# Patient Record
Sex: FEMALE | Race: BLACK OR AFRICAN AMERICAN | Employment: OTHER | URBAN - METROPOLITAN AREA
[De-identification: names, ages, dates, MRNs, and addresses within clinical notes are randomized per-mention and may not be internally consistent; named-entity substitution may affect disease eponyms.]

---

## 2017-02-06 PROBLEM — R09.89 PULMONARY HYPERINFLATION: Status: ACTIVE | Noted: 2017-02-06

## 2017-02-06 PROBLEM — J44.9 COPD, SEVERITY TO BE DETERMINED (HCC): Status: ACTIVE | Noted: 2017-02-06

## 2017-05-03 PROBLEM — J44.9 COPD, SEVERITY TO BE DETERMINED (HCC): Status: RESOLVED | Noted: 2017-02-06 | Resolved: 2017-05-03

## 2017-05-03 PROBLEM — R09.89 PULMONARY HYPERINFLATION: Status: RESOLVED | Noted: 2017-02-06 | Resolved: 2017-05-03

## 2017-05-03 PROBLEM — J15.4 STREPTOCOCCAL PNEUMONIA (HCC): Status: ACTIVE | Noted: 2017-05-03

## 2017-05-04 PROBLEM — R19.7 DIARRHEA OF PRESUMED INFECTIOUS ORIGIN: Status: ACTIVE | Noted: 2017-05-04

## 2017-05-04 PROBLEM — J15.4 STREPTOCOCCAL PNEUMONIA (HCC): Status: RESOLVED | Noted: 2017-05-03 | Resolved: 2017-05-04

## 2017-05-05 PROBLEM — G93.40 ENCEPHALOPATHY ACUTE: Status: ACTIVE | Noted: 2017-05-05

## 2017-05-05 PROBLEM — J96.20 ACUTE ON CHRONIC RESPIRATORY FAILURE (HCC): Status: ACTIVE | Noted: 2017-05-05

## 2017-09-21 PROBLEM — J47.9 BRONCHIECTASIS WITHOUT COMPLICATION (HCC): Status: ACTIVE | Noted: 2017-09-21

## 2017-09-21 PROBLEM — R09.02 HYPOXEMIA: Status: ACTIVE | Noted: 2017-09-21

## 2017-10-09 PROBLEM — R07.1 CHEST PAIN MADE WORSE BY BREATHING: Status: ACTIVE | Noted: 2017-10-09

## 2017-10-09 PROBLEM — B02.9 SHINGLES RASH: Status: ACTIVE | Noted: 2017-10-09

## 2017-11-19 PROBLEM — I50.9 CHF (CONGESTIVE HEART FAILURE) (HCC): Status: ACTIVE | Noted: 2017-11-19

## 2017-11-19 PROBLEM — I48.91 ATRIAL FIBRILLATION WITH RVR (HCC): Status: ACTIVE | Noted: 2017-11-19

## 2017-11-19 PROBLEM — K43.2 INCISIONAL HERNIA, WITHOUT OBSTRUCTION OR GANGRENE: Status: ACTIVE | Noted: 2017-11-19

## 2017-11-21 PROBLEM — J96.11 CHRONIC RESPIRATORY FAILURE WITH HYPOXIA (HCC): Status: ACTIVE | Noted: 2017-11-21

## 2017-11-21 PROBLEM — J96.11 CHRONIC RESPIRATORY FAILURE WITH HYPOXIA (HCC): Chronic | Status: ACTIVE | Noted: 2017-11-21

## 2017-12-26 PROBLEM — R19.7 DIARRHEA OF PRESUMED INFECTIOUS ORIGIN: Status: RESOLVED | Noted: 2017-05-04 | Resolved: 2017-12-26

## 2017-12-26 PROBLEM — K43.2 INCISIONAL HERNIA, WITHOUT OBSTRUCTION OR GANGRENE: Status: RESOLVED | Noted: 2017-11-19 | Resolved: 2017-12-26

## 2017-12-26 PROBLEM — B02.9 SHINGLES RASH: Status: RESOLVED | Noted: 2017-10-09 | Resolved: 2017-12-26

## 2017-12-26 PROBLEM — R07.1 CHEST PAIN MADE WORSE BY BREATHING: Status: RESOLVED | Noted: 2017-10-09 | Resolved: 2017-12-26

## 2017-12-26 PROBLEM — I48.91 ATRIAL FIBRILLATION WITH RVR (HCC): Status: RESOLVED | Noted: 2017-11-19 | Resolved: 2017-12-26

## 2018-02-08 PROBLEM — J96.01 ACUTE RESPIRATORY FAILURE WITH HYPOXIA (HCC): Status: ACTIVE | Noted: 2018-02-08

## 2018-02-08 PROBLEM — R06.00 DYSPNEA: Status: ACTIVE | Noted: 2018-02-08

## 2018-02-08 PROBLEM — J44.1 COPD EXACERBATION (HCC): Status: ACTIVE | Noted: 2017-02-06

## 2018-02-13 PROBLEM — J96.01 ACUTE RESPIRATORY FAILURE WITH HYPOXIA (HCC): Status: RESOLVED | Noted: 2018-02-08 | Resolved: 2018-02-13

## 2018-03-15 ENCOUNTER — TELEPHONE (OUTPATIENT)
Dept: PHARMACY | Facility: CLINIC | Age: 63
End: 2018-03-15

## 2018-03-15 NOTE — TELEPHONE ENCOUNTER
Spoke to Jaswinder Samuel at [facility] who said the patient is still residing on the skilled nursing floor with them.     Dawson Snyder, PharmD  [address] [place] [address] Pharmacist  [phone] or [phone] (Option 7)

## 2018-03-20 ENCOUNTER — TELEPHONE (OUTPATIENT)
Dept: FAMILY MEDICINE CLINIC | Age: 63
End: 2018-03-20

## 2018-03-20 NOTE — TELEPHONE ENCOUNTER
Thomas Hakwins RN for [facility] admitted patient today for nursing, PT,OT and home health aid. Patient declined Home health aid but is requesting speech therapy for cognition. Please advise if verbal order is appropriate.

## 2018-04-03 DIAGNOSIS — M25.561 RIGHT KNEE PAIN, UNSPECIFIED CHRONICITY: ICD-10-CM

## 2018-04-03 DIAGNOSIS — D86.9 SARCOIDOSIS: ICD-10-CM

## 2018-04-03 DIAGNOSIS — G89.4 CHRONIC PAIN DISORDER: ICD-10-CM

## 2018-04-03 DIAGNOSIS — M25.551 HIP PAIN, RIGHT: ICD-10-CM

## 2018-04-03 DIAGNOSIS — M87.111 STEROID-INDUCED AVASCULAR NECROSIS OF RIGHT SHOULDER (HCC): ICD-10-CM

## 2018-04-03 DIAGNOSIS — M51.26 LUMBAR DISC HERNIATION: ICD-10-CM

## 2018-04-03 DIAGNOSIS — T38.0X5A STEROID-INDUCED AVASCULAR NECROSIS OF RIGHT SHOULDER (HCC): ICD-10-CM

## 2018-04-03 RX ORDER — PROMETHAZINE HYDROCHLORIDE AND CODEINE PHOSPHATE 6.25; 1 MG/5ML; MG/5ML
5 SYRUP ORAL 3 TIMES DAILY PRN
Status: CANCELLED | OUTPATIENT
Start: 2018-04-03

## 2018-04-03 RX ORDER — OXYCODONE HCL 20 MG/1
20 TABLET, FILM COATED, EXTENDED RELEASE ORAL EVERY 12 HOURS
Qty: 60 TABLET | Refills: 0 | Status: SHIPPED | OUTPATIENT
Start: 2018-04-03 | End: 2018-05-03

## 2018-04-03 RX ORDER — PROMETHAZINE HYDROCHLORIDE AND CODEINE PHOSPHATE 6.25; 1 MG/5ML; MG/5ML
10 SYRUP ORAL 3 TIMES DAILY PRN
Qty: 480 ML | Refills: 2 | Status: SHIPPED | OUTPATIENT
Start: 2018-04-03 | End: 2018-08-17 | Stop reason: SDUPTHER

## 2018-04-03 RX ORDER — OXYCODONE AND ACETAMINOPHEN 10; 325 MG/1; MG/1
1 TABLET ORAL EVERY 8 HOURS PRN
Qty: 90 TABLET | Refills: 0 | Status: SHIPPED | OUTPATIENT
Start: 2018-04-03 | End: 2018-05-03

## 2018-04-10 ENCOUNTER — TELEPHONE (OUTPATIENT)
Dept: FAMILY MEDICINE CLINIC | Age: 63
End: 2018-04-10
Payer: COMMERCIAL

## 2018-04-10 DIAGNOSIS — J44.9 OBSTRUCTIVE CHRONIC BRONCHITIS WITHOUT EXACERBATION (HCC): Primary | ICD-10-CM

## 2018-04-10 DIAGNOSIS — E23.2 DIABETES INSIPIDUS (HCC): ICD-10-CM

## 2018-04-10 DIAGNOSIS — I48.0 PAROXYSMAL ATRIAL FIBRILLATION (HCC): ICD-10-CM

## 2018-04-10 DIAGNOSIS — N18.2 CHRONIC KIDNEY DISEASE, STAGE II (MILD): ICD-10-CM

## 2018-04-10 DIAGNOSIS — D86.0 PULMONARY SARCOIDOSIS (HCC): ICD-10-CM

## 2018-04-10 DIAGNOSIS — F33.9 EPISODE OF RECURRENT MAJOR DEPRESSIVE DISORDER, UNSPECIFIED DEPRESSION EPISODE SEVERITY (HCC): ICD-10-CM

## 2018-04-10 DIAGNOSIS — Z99.81 DEPENDENCE ON SUPPLEMENTAL OXYGEN: ICD-10-CM

## 2018-04-10 DIAGNOSIS — I27.20 PULMONARY HTN (HCC): ICD-10-CM

## 2018-04-10 PROCEDURE — G0180 MD CERTIFICATION HHA PATIENT: HCPCS | Performed by: FAMILY MEDICINE

## 2018-05-05 ENCOUNTER — HOSPITAL ENCOUNTER (INPATIENT)
Age: 63
LOS: 3 days | Discharge: NURSING, REHAB OR LONG-TERM CARE | DRG: 191 | End: 2018-05-08
Attending: EMERGENCY MEDICINE | Admitting: FAMILY MEDICINE
Payer: COMMERCIAL

## 2018-05-05 ENCOUNTER — APPOINTMENT (OUTPATIENT)
Dept: GENERAL RADIOLOGY | Age: 63
DRG: 191 | End: 2018-05-05
Payer: COMMERCIAL

## 2018-05-05 DIAGNOSIS — J18.9 COMMUNITY ACQUIRED PNEUMONIA OF RIGHT LOWER LOBE OF LUNG: Primary | ICD-10-CM

## 2018-05-05 PROBLEM — J16.0 CAP (COMMUNITY ACQUIRED PNEUMONIA) DUE TO CHLAMYDIA SPECIES: Status: ACTIVE | Noted: 2018-05-05

## 2018-05-05 LAB
ALBUMIN SERPL-MCNC: 3.7 G/DL (ref 3.5–5.2)
ALP BLD-CCNC: 49 U/L (ref 35–104)
ALT SERPL-CCNC: 10 U/L (ref 0–32)
ANION GAP SERPL CALCULATED.3IONS-SCNC: 9 MMOL/L (ref 7–16)
AST SERPL-CCNC: 20 U/L (ref 0–31)
BASOPHILS ABSOLUTE: 0 E9/L (ref 0–0.2)
BASOPHILS RELATIVE PERCENT: 0 % (ref 0–2)
BILIRUB SERPL-MCNC: <0.2 MG/DL (ref 0–1.2)
BILIRUBIN URINE: NEGATIVE
BLOOD, URINE: NEGATIVE
BUN BLDV-MCNC: 22 MG/DL (ref 8–23)
CALCIUM SERPL-MCNC: 8.7 MG/DL (ref 8.6–10.2)
CHLORIDE BLD-SCNC: 87 MMOL/L (ref 98–107)
CLARITY: CLEAR
CO2: 33 MMOL/L (ref 22–29)
COLOR: YELLOW
CREAT SERPL-MCNC: 1.1 MG/DL (ref 0.5–1)
EKG ATRIAL RATE: 80 BPM
EKG P AXIS: 62 DEGREES
EKG P-R INTERVAL: 126 MS
EKG Q-T INTERVAL: 378 MS
EKG QRS DURATION: 70 MS
EKG QTC CALCULATION (BAZETT): 435 MS
EKG R AXIS: 79 DEGREES
EKG T AXIS: 61 DEGREES
EKG VENTRICULAR RATE: 80 BPM
EOSINOPHILS ABSOLUTE: 0.14 E9/L (ref 0.05–0.5)
EOSINOPHILS RELATIVE PERCENT: 2.6 % (ref 0–6)
FILM ARRAY ADENOVIRUS: NORMAL
FILM ARRAY BORDETELLA PERTUSSIS: NORMAL
FILM ARRAY CHLAMYDOPHILIA PNEUMONIAE: NORMAL
FILM ARRAY CORONAVIRUS 229E: NORMAL
FILM ARRAY CORONAVIRUS HKU1: NORMAL
FILM ARRAY CORONAVIRUS NL63: NORMAL
FILM ARRAY CORONAVIRUS OC43: NORMAL
FILM ARRAY INFLUENZA A VIRUS 09H1: NORMAL
FILM ARRAY INFLUENZA A VIRUS H1: NORMAL
FILM ARRAY INFLUENZA A VIRUS H3: NORMAL
FILM ARRAY INFLUENZA A VIRUS: NORMAL
FILM ARRAY INFLUENZA B: NORMAL
FILM ARRAY METAPNEUMOVIRUS: NORMAL
FILM ARRAY MYCOPLASMA PNEUMONIAE: NORMAL
FILM ARRAY PARAINFLUENZA VIRUS 1: NORMAL
FILM ARRAY PARAINFLUENZA VIRUS 2: NORMAL
FILM ARRAY PARAINFLUENZA VIRUS 3: NORMAL
FILM ARRAY PARAINFLUENZA VIRUS 4: NORMAL
FILM ARRAY RESPIRATORY SYNCITIAL VIRUS: NORMAL
FILM ARRAY RHINOVIRUS/ENTEROVIRUS: NORMAL
GFR AFRICAN AMERICAN: >60
GFR NON-AFRICAN AMERICAN: >60 ML/MIN/1.73
GLUCOSE BLD-MCNC: 89 MG/DL (ref 74–109)
GLUCOSE URINE: NEGATIVE MG/DL
HCT VFR BLD CALC: 27.3 % (ref 34–48)
HEMOGLOBIN: 8.5 G/DL (ref 11.5–15.5)
HYPOCHROMIA: ABNORMAL
INFLUENZA A BY PCR: NOT DETECTED
INFLUENZA B BY PCR: NOT DETECTED
KETONES, URINE: NEGATIVE MG/DL
L. PNEUMOPHILA SEROGP 1 UR AG: NORMAL
LACTIC ACID: 1.6 MMOL/L (ref 0.5–2.2)
LEUKOCYTE ESTERASE, URINE: NEGATIVE
LYMPHOCYTES ABSOLUTE: 0.31 E9/L (ref 1.5–4)
LYMPHOCYTES RELATIVE PERCENT: 6.1 % (ref 20–42)
MCH RBC QN AUTO: 25.5 PG (ref 26–35)
MCHC RBC AUTO-ENTMCNC: 31.1 % (ref 32–34.5)
MCV RBC AUTO: 82 FL (ref 80–99.9)
MONOCYTES ABSOLUTE: 0.16 E9/L (ref 0.1–0.95)
MONOCYTES RELATIVE PERCENT: 2.6 % (ref 2–12)
MYELOCYTE PERCENT: 0.9 % (ref 0–0)
NEUTROPHILS ABSOLUTE: 4.63 E9/L (ref 1.8–7.3)
NEUTROPHILS RELATIVE PERCENT: 87.8 % (ref 43–80)
NITRITE, URINE: NEGATIVE
PDW BLD-RTO: 16.4 FL (ref 11.5–15)
PH UA: 8 (ref 5–9)
PLATELET # BLD: 143 E9/L (ref 130–450)
PMV BLD AUTO: 10 FL (ref 7–12)
POTASSIUM SERPL-SCNC: 5 MMOL/L (ref 3.5–5)
PRO-BNP: 342 PG/ML (ref 0–125)
PROTEIN UA: NEGATIVE MG/DL
RBC # BLD: 3.33 E12/L (ref 3.5–5.5)
SODIUM BLD-SCNC: 129 MMOL/L (ref 132–146)
SPECIFIC GRAVITY UA: 1.01 (ref 1–1.03)
STREP PNEUMONIAE ANTIGEN, URINE: NORMAL
TOTAL PROTEIN: 6.3 G/DL (ref 6.4–8.3)
TROPONIN: <0.01 NG/ML (ref 0–0.03)
UROBILINOGEN, URINE: 0.2 E.U./DL
WBC # BLD: 5.2 E9/L (ref 4.5–11.5)

## 2018-05-05 PROCEDURE — 6370000000 HC RX 637 (ALT 250 FOR IP): Performed by: EMERGENCY MEDICINE

## 2018-05-05 PROCEDURE — 87798 DETECT AGENT NOS DNA AMP: CPT

## 2018-05-05 PROCEDURE — 96374 THER/PROPH/DIAG INJ IV PUSH: CPT

## 2018-05-05 PROCEDURE — 94640 AIRWAY INHALATION TREATMENT: CPT

## 2018-05-05 PROCEDURE — 99222 1ST HOSP IP/OBS MODERATE 55: CPT | Performed by: STUDENT IN AN ORGANIZED HEALTH CARE EDUCATION/TRAINING PROGRAM

## 2018-05-05 PROCEDURE — 87077 CULTURE AEROBIC IDENTIFY: CPT

## 2018-05-05 PROCEDURE — 94761 N-INVAS EAR/PLS OXIMETRY MLT: CPT

## 2018-05-05 PROCEDURE — 6370000000 HC RX 637 (ALT 250 FOR IP): Performed by: STUDENT IN AN ORGANIZED HEALTH CARE EDUCATION/TRAINING PROGRAM

## 2018-05-05 PROCEDURE — 93005 ELECTROCARDIOGRAM TRACING: CPT | Performed by: EMERGENCY MEDICINE

## 2018-05-05 PROCEDURE — 6360000002 HC RX W HCPCS: Performed by: EMERGENCY MEDICINE

## 2018-05-05 PROCEDURE — 83605 ASSAY OF LACTIC ACID: CPT

## 2018-05-05 PROCEDURE — 2580000003 HC RX 258: Performed by: EMERGENCY MEDICINE

## 2018-05-05 PROCEDURE — 36415 COLL VENOUS BLD VENIPUNCTURE: CPT

## 2018-05-05 PROCEDURE — 87502 INFLUENZA DNA AMP PROBE: CPT

## 2018-05-05 PROCEDURE — 87186 SC STD MICRODIL/AGAR DIL: CPT

## 2018-05-05 PROCEDURE — 87088 URINE BACTERIA CULTURE: CPT

## 2018-05-05 PROCEDURE — 2060000000 HC ICU INTERMEDIATE R&B

## 2018-05-05 PROCEDURE — 83880 ASSAY OF NATRIURETIC PEPTIDE: CPT

## 2018-05-05 PROCEDURE — 2700000000 HC OXYGEN THERAPY PER DAY

## 2018-05-05 PROCEDURE — 87149 DNA/RNA DIRECT PROBE: CPT

## 2018-05-05 PROCEDURE — 51702 INSERT TEMP BLADDER CATH: CPT

## 2018-05-05 PROCEDURE — 94664 DEMO&/EVAL PT USE INHALER: CPT

## 2018-05-05 PROCEDURE — 71045 X-RAY EXAM CHEST 1 VIEW: CPT

## 2018-05-05 PROCEDURE — 94760 N-INVAS EAR/PLS OXIMETRY 1: CPT

## 2018-05-05 PROCEDURE — 99285 EMERGENCY DEPT VISIT HI MDM: CPT

## 2018-05-05 PROCEDURE — 80053 COMPREHEN METABOLIC PANEL: CPT

## 2018-05-05 PROCEDURE — 87501 INFLUENZA DNA AMP PROB 1+: CPT

## 2018-05-05 PROCEDURE — 85025 COMPLETE CBC W/AUTO DIFF WBC: CPT

## 2018-05-05 PROCEDURE — 81003 URINALYSIS AUTO W/O SCOPE: CPT

## 2018-05-05 PROCEDURE — 87503 INFLUENZA DNA AMP PROB ADDL: CPT

## 2018-05-05 PROCEDURE — 2580000003 HC RX 258: Performed by: STUDENT IN AN ORGANIZED HEALTH CARE EDUCATION/TRAINING PROGRAM

## 2018-05-05 PROCEDURE — 87040 BLOOD CULTURE FOR BACTERIA: CPT

## 2018-05-05 PROCEDURE — 87581 M.PNEUMON DNA AMP PROBE: CPT

## 2018-05-05 PROCEDURE — 84484 ASSAY OF TROPONIN QUANT: CPT

## 2018-05-05 PROCEDURE — 87486 CHLMYD PNEUM DNA AMP PROBE: CPT

## 2018-05-05 PROCEDURE — 87450 HC DIRECT STREP B ANTIGEN: CPT

## 2018-05-05 RX ORDER — POTASSIUM CHLORIDE 20 MEQ/1
20 TABLET, EXTENDED RELEASE ORAL 2 TIMES DAILY
Status: DISCONTINUED | OUTPATIENT
Start: 2018-05-05 | End: 2018-05-08 | Stop reason: HOSPADM

## 2018-05-05 RX ORDER — METOPROLOL SUCCINATE 25 MG/1
100 TABLET, EXTENDED RELEASE ORAL ONCE
Status: COMPLETED | OUTPATIENT
Start: 2018-05-05 | End: 2018-05-05

## 2018-05-05 RX ORDER — CEFTRIAXONE 1 G/1
1 INJECTION, POWDER, FOR SOLUTION INTRAMUSCULAR; INTRAVENOUS DAILY
Status: DISCONTINUED | OUTPATIENT
Start: 2018-05-05 | End: 2018-05-05 | Stop reason: SDUPTHER

## 2018-05-05 RX ORDER — POLYVINYL ALCOHOL 14 MG/ML
1 SOLUTION/ DROPS OPHTHALMIC PRN
Status: DISCONTINUED | OUTPATIENT
Start: 2018-05-05 | End: 2018-05-08 | Stop reason: HOSPADM

## 2018-05-05 RX ORDER — ESCITALOPRAM OXALATE 10 MG/1
20 TABLET ORAL DAILY
Status: DISCONTINUED | OUTPATIENT
Start: 2018-05-05 | End: 2018-05-08 | Stop reason: HOSPADM

## 2018-05-05 RX ORDER — PREDNISONE 20 MG/1
40 TABLET ORAL DAILY
Status: DISCONTINUED | OUTPATIENT
Start: 2018-05-06 | End: 2018-05-08 | Stop reason: HOSPADM

## 2018-05-05 RX ORDER — ONDANSETRON 2 MG/ML
4 INJECTION INTRAMUSCULAR; INTRAVENOUS EVERY 6 HOURS PRN
Status: DISCONTINUED | OUTPATIENT
Start: 2018-05-05 | End: 2018-05-08 | Stop reason: HOSPADM

## 2018-05-05 RX ORDER — FERROUS SULFATE 325(65) MG
325 TABLET ORAL
Status: DISCONTINUED | OUTPATIENT
Start: 2018-05-05 | End: 2018-05-08 | Stop reason: HOSPADM

## 2018-05-05 RX ORDER — CALCITONIN SALMON 200 [IU]/.09ML
1 SPRAY, METERED NASAL 2 TIMES DAILY
Status: DISCONTINUED | OUTPATIENT
Start: 2018-05-05 | End: 2018-05-05 | Stop reason: CLARIF

## 2018-05-05 RX ORDER — LEVOTHYROXINE SODIUM 0.07 MG/1
75 TABLET ORAL DAILY
Status: DISCONTINUED | OUTPATIENT
Start: 2018-05-05 | End: 2018-05-08 | Stop reason: HOSPADM

## 2018-05-05 RX ORDER — OXYCODONE HCL 10 MG/1
10 TABLET, FILM COATED, EXTENDED RELEASE ORAL EVERY 12 HOURS SCHEDULED
Status: DISCONTINUED | OUTPATIENT
Start: 2018-05-05 | End: 2018-05-07

## 2018-05-05 RX ORDER — FAMOTIDINE 20 MG/1
20 TABLET, FILM COATED ORAL 2 TIMES DAILY
Status: DISCONTINUED | OUTPATIENT
Start: 2018-05-05 | End: 2018-05-08 | Stop reason: HOSPADM

## 2018-05-05 RX ORDER — BUMETANIDE 1 MG/1
1 TABLET ORAL ONCE
Status: COMPLETED | OUTPATIENT
Start: 2018-05-05 | End: 2018-05-05

## 2018-05-05 RX ORDER — HYDRALAZINE HYDROCHLORIDE 20 MG/ML
10 INJECTION INTRAMUSCULAR; INTRAVENOUS ONCE
Status: COMPLETED | OUTPATIENT
Start: 2018-05-05 | End: 2018-05-05

## 2018-05-05 RX ORDER — METOPROLOL SUCCINATE 50 MG/1
150 TABLET, EXTENDED RELEASE ORAL DAILY
Status: DISCONTINUED | OUTPATIENT
Start: 2018-05-05 | End: 2018-05-08 | Stop reason: HOSPADM

## 2018-05-05 RX ORDER — SODIUM CHLORIDE 0.9 % (FLUSH) 0.9 %
10 SYRINGE (ML) INJECTION PRN
Status: DISCONTINUED | OUTPATIENT
Start: 2018-05-05 | End: 2018-05-08 | Stop reason: HOSPADM

## 2018-05-05 RX ORDER — IPRATROPIUM BROMIDE AND ALBUTEROL SULFATE 2.5; .5 MG/3ML; MG/3ML
1 SOLUTION RESPIRATORY (INHALATION) EVERY 4 HOURS
Status: DISCONTINUED | OUTPATIENT
Start: 2018-05-05 | End: 2018-05-08

## 2018-05-05 RX ORDER — DESMOPRESSIN ACETATE 0.1 MG/1
400 TABLET ORAL 2 TIMES DAILY
Status: DISCONTINUED | OUTPATIENT
Start: 2018-05-05 | End: 2018-05-08 | Stop reason: HOSPADM

## 2018-05-05 RX ORDER — METOPROLOL SUCCINATE 50 MG/1
50 TABLET, EXTENDED RELEASE ORAL DAILY
Status: DISCONTINUED | OUTPATIENT
Start: 2018-05-05 | End: 2018-05-05 | Stop reason: SDUPTHER

## 2018-05-05 RX ORDER — AMLODIPINE BESYLATE 10 MG/1
10 TABLET ORAL DAILY
Status: DISCONTINUED | OUTPATIENT
Start: 2018-05-05 | End: 2018-05-08 | Stop reason: HOSPADM

## 2018-05-05 RX ORDER — DICYCLOMINE HYDROCHLORIDE 10 MG/1
10 CAPSULE ORAL
Status: DISCONTINUED | OUTPATIENT
Start: 2018-05-05 | End: 2018-05-08 | Stop reason: HOSPADM

## 2018-05-05 RX ORDER — METHYLPREDNISOLONE SODIUM SUCCINATE 125 MG/2ML
125 INJECTION, POWDER, LYOPHILIZED, FOR SOLUTION INTRAMUSCULAR; INTRAVENOUS ONCE
Status: COMPLETED | OUTPATIENT
Start: 2018-05-05 | End: 2018-05-05

## 2018-05-05 RX ORDER — SODIUM CHLORIDE 0.9 % (FLUSH) 0.9 %
10 SYRINGE (ML) INJECTION EVERY 12 HOURS SCHEDULED
Status: DISCONTINUED | OUTPATIENT
Start: 2018-05-05 | End: 2018-05-08 | Stop reason: HOSPADM

## 2018-05-05 RX ORDER — IPRATROPIUM BROMIDE AND ALBUTEROL SULFATE 2.5; .5 MG/3ML; MG/3ML
1 SOLUTION RESPIRATORY (INHALATION)
Status: DISPENSED | OUTPATIENT
Start: 2018-05-05 | End: 2018-05-05

## 2018-05-05 RX ORDER — DESMOPRESSIN ACETATE 0.1 MG/ML
2 SOLUTION NASAL 2 TIMES DAILY
Status: DISCONTINUED | OUTPATIENT
Start: 2018-05-05 | End: 2018-05-08 | Stop reason: HOSPADM

## 2018-05-05 RX ORDER — OXYCODONE AND ACETAMINOPHEN 10; 325 MG/1; MG/1
1 TABLET ORAL EVERY 8 HOURS PRN
Status: DISCONTINUED | OUTPATIENT
Start: 2018-05-05 | End: 2018-05-08 | Stop reason: HOSPADM

## 2018-05-05 RX ORDER — BUMETANIDE 1 MG/1
1 TABLET ORAL 2 TIMES DAILY
Status: DISCONTINUED | OUTPATIENT
Start: 2018-05-05 | End: 2018-05-08

## 2018-05-05 RX ADMIN — APIXABAN 5 MG: 5 TABLET, FILM COATED ORAL at 21:46

## 2018-05-05 RX ADMIN — AZITHROMYCIN MONOHYDRATE 500 MG: 500 INJECTION, POWDER, LYOPHILIZED, FOR SOLUTION INTRAVENOUS at 06:55

## 2018-05-05 RX ADMIN — ESCITALOPRAM 20 MG: 10 TABLET, FILM COATED ORAL at 10:25

## 2018-05-05 RX ADMIN — BUMETANIDE 1 MG: 1 TABLET ORAL at 21:46

## 2018-05-05 RX ADMIN — FERROUS SULFATE TAB 325 MG (65 MG ELEMENTAL FE) 325 MG: 325 (65 FE) TAB at 10:25

## 2018-05-05 RX ADMIN — DESMOPRESSIN ACETATE 20 MCG: 10 SPRAY NASAL at 21:46

## 2018-05-05 RX ADMIN — BUMETANIDE 1 MG: 1 TABLET ORAL at 10:26

## 2018-05-05 RX ADMIN — OXYCODONE HYDROCHLORIDE 10 MG: 10 TABLET, FILM COATED, EXTENDED RELEASE ORAL at 21:46

## 2018-05-05 RX ADMIN — MOMETASONE FUROATE AND FORMOTEROL FUMARATE DIHYDRATE 2 PUFF: 200; 5 AEROSOL RESPIRATORY (INHALATION) at 10:27

## 2018-05-05 RX ADMIN — FAMOTIDINE 20 MG: 20 TABLET, FILM COATED ORAL at 21:50

## 2018-05-05 RX ADMIN — POTASSIUM CHLORIDE 20 MEQ: 20 TABLET, EXTENDED RELEASE ORAL at 21:46

## 2018-05-05 RX ADMIN — AMLODIPINE BESYLATE 10 MG: 10 TABLET ORAL at 10:26

## 2018-05-05 RX ADMIN — DESMOPRESSIN ACETATE 400 MCG: 0.1 TABLET ORAL at 21:47

## 2018-05-05 RX ADMIN — Medication 10 ML: at 21:47

## 2018-05-05 RX ADMIN — APIXABAN 5 MG: 5 TABLET, FILM COATED ORAL at 10:26

## 2018-05-05 RX ADMIN — HYDRALAZINE HYDROCHLORIDE 10 MG: 20 INJECTION INTRAMUSCULAR; INTRAVENOUS at 04:27

## 2018-05-05 RX ADMIN — OXYCODONE HYDROCHLORIDE AND ACETAMINOPHEN 1 TABLET: 10; 325 TABLET ORAL at 17:58

## 2018-05-05 RX ADMIN — IPRATROPIUM BROMIDE AND ALBUTEROL SULFATE 1 AMPULE: 2.5; .5 SOLUTION RESPIRATORY (INHALATION) at 17:38

## 2018-05-05 RX ADMIN — BUMETANIDE 1 MG: 1 TABLET ORAL at 02:19

## 2018-05-05 RX ADMIN — IPRATROPIUM BROMIDE AND ALBUTEROL SULFATE 1 AMPULE: 2.5; .5 SOLUTION RESPIRATORY (INHALATION) at 01:58

## 2018-05-05 RX ADMIN — DICYCLOMINE HYDROCHLORIDE 10 MG: 10 CAPSULE ORAL at 21:46

## 2018-05-05 RX ADMIN — OXYCODONE HYDROCHLORIDE 10 MG: 10 TABLET, FILM COATED, EXTENDED RELEASE ORAL at 10:25

## 2018-05-05 RX ADMIN — OXYCODONE HYDROCHLORIDE AND ACETAMINOPHEN 1 TABLET: 10; 325 TABLET ORAL at 07:02

## 2018-05-05 RX ADMIN — DICYCLOMINE HYDROCHLORIDE 10 MG: 10 CAPSULE ORAL at 10:26

## 2018-05-05 RX ADMIN — IPRATROPIUM BROMIDE AND ALBUTEROL SULFATE 1 AMPULE: 2.5; .5 SOLUTION RESPIRATORY (INHALATION) at 01:55

## 2018-05-05 RX ADMIN — DICYCLOMINE HYDROCHLORIDE 10 MG: 10 CAPSULE ORAL at 17:54

## 2018-05-05 RX ADMIN — MOMETASONE FUROATE AND FORMOTEROL FUMARATE DIHYDRATE 2 PUFF: 200; 5 AEROSOL RESPIRATORY (INHALATION) at 21:47

## 2018-05-05 RX ADMIN — METOPROLOL SUCCINATE 100 MG: 25 TABLET, EXTENDED RELEASE ORAL at 02:19

## 2018-05-05 RX ADMIN — POTASSIUM CHLORIDE 20 MEQ: 20 TABLET, EXTENDED RELEASE ORAL at 10:25

## 2018-05-05 RX ADMIN — LEVOTHYROXINE SODIUM 75 MCG: 75 TABLET ORAL at 10:27

## 2018-05-05 RX ADMIN — IPRATROPIUM BROMIDE AND ALBUTEROL SULFATE 1 AMPULE: 2.5; .5 SOLUTION RESPIRATORY (INHALATION) at 13:40

## 2018-05-05 RX ADMIN — DESMOPRESSIN ACETATE 20 MCG: 10 SPRAY NASAL at 10:27

## 2018-05-05 RX ADMIN — METHYLPREDNISOLONE SODIUM SUCCINATE 125 MG: 125 INJECTION, POWDER, FOR SOLUTION INTRAMUSCULAR; INTRAVENOUS at 01:54

## 2018-05-05 RX ADMIN — METOPROLOL SUCCINATE 150 MG: 50 TABLET, FILM COATED, EXTENDED RELEASE ORAL at 10:25

## 2018-05-05 RX ADMIN — CEFTRIAXONE SODIUM 2 G: 2 INJECTION, POWDER, FOR SOLUTION INTRAMUSCULAR; INTRAVENOUS at 04:29

## 2018-05-05 RX ADMIN — IPRATROPIUM BROMIDE AND ALBUTEROL SULFATE 1 AMPULE: 2.5; .5 SOLUTION RESPIRATORY (INHALATION) at 09:14

## 2018-05-05 RX ADMIN — FAMOTIDINE 20 MG: 20 TABLET, FILM COATED ORAL at 10:25

## 2018-05-05 RX ADMIN — DESMOPRESSIN ACETATE 400 MCG: 0.1 TABLET ORAL at 10:26

## 2018-05-05 ASSESSMENT — ENCOUNTER SYMPTOMS
VOMITING: 0
SINUS PRESSURE: 0
EYE PAIN: 0
DIARRHEA: 0
EYE DISCHARGE: 0
ABDOMINAL DISTENTION: 0
WHEEZING: 0
COUGH: 0
SORE THROAT: 0
BACK PAIN: 0
SHORTNESS OF BREATH: 1
NAUSEA: 0
EYE REDNESS: 0

## 2018-05-05 ASSESSMENT — PAIN DESCRIPTION - PAIN TYPE
TYPE: ACUTE PAIN

## 2018-05-05 ASSESSMENT — PAIN DESCRIPTION - ORIENTATION: ORIENTATION: RIGHT;LEFT

## 2018-05-05 ASSESSMENT — PAIN SCALES - GENERAL
PAINLEVEL_OUTOF10: 9
PAINLEVEL_OUTOF10: 5
PAINLEVEL_OUTOF10: 8
PAINLEVEL_OUTOF10: 6
PAINLEVEL_OUTOF10: 5
PAINLEVEL_OUTOF10: 9

## 2018-05-05 ASSESSMENT — PAIN DESCRIPTION - DESCRIPTORS: DESCRIPTORS: DISCOMFORT

## 2018-05-05 ASSESSMENT — PAIN DESCRIPTION - LOCATION
LOCATION: CHEST
LOCATION: HEAD
LOCATION: CHEST

## 2018-05-05 ASSESSMENT — PAIN DESCRIPTION - FREQUENCY: FREQUENCY: INTERMITTENT

## 2018-05-06 ENCOUNTER — APPOINTMENT (OUTPATIENT)
Dept: GENERAL RADIOLOGY | Age: 63
DRG: 191 | End: 2018-05-06
Payer: COMMERCIAL

## 2018-05-06 LAB
ANION GAP SERPL CALCULATED.3IONS-SCNC: 17 MMOL/L (ref 7–16)
BASOPHILS ABSOLUTE: 0 E9/L (ref 0–0.2)
BASOPHILS RELATIVE PERCENT: 0 % (ref 0–2)
BUN BLDV-MCNC: 25 MG/DL (ref 8–23)
CALCIUM SERPL-MCNC: 9.1 MG/DL (ref 8.6–10.2)
CHLORIDE BLD-SCNC: 85 MMOL/L (ref 98–107)
CO2: 32 MMOL/L (ref 22–29)
CREAT SERPL-MCNC: 1.1 MG/DL (ref 0.5–1)
EOSINOPHILS ABSOLUTE: 0 E9/L (ref 0.05–0.5)
EOSINOPHILS RELATIVE PERCENT: 0 % (ref 0–6)
GFR AFRICAN AMERICAN: >60
GFR NON-AFRICAN AMERICAN: >60 ML/MIN/1.73
GLUCOSE BLD-MCNC: 116 MG/DL (ref 74–109)
HCT VFR BLD CALC: 34 % (ref 34–48)
HEMOGLOBIN: 10.5 G/DL (ref 11.5–15.5)
HYPOCHROMIA: ABNORMAL
LYMPHOCYTES ABSOLUTE: 0.53 E9/L (ref 1.5–4)
LYMPHOCYTES RELATIVE PERCENT: 7.8 % (ref 20–42)
MAGNESIUM: 2 MG/DL (ref 1.6–2.6)
MCH RBC QN AUTO: 24.9 PG (ref 26–35)
MCHC RBC AUTO-ENTMCNC: 30.9 % (ref 32–34.5)
MCV RBC AUTO: 80.8 FL (ref 80–99.9)
MONOCYTES ABSOLUTE: 0.4 E9/L (ref 0.1–0.95)
MONOCYTES RELATIVE PERCENT: 6.1 % (ref 2–12)
NEUTROPHILS ABSOLUTE: 5.68 E9/L (ref 1.8–7.3)
NEUTROPHILS RELATIVE PERCENT: 86.1 % (ref 43–80)
NUCLEATED RED BLOOD CELLS: 0.9 /100 WBC
OVALOCYTES: ABNORMAL
PDW BLD-RTO: 17.2 FL (ref 11.5–15)
PLATELET # BLD: 180 E9/L (ref 130–450)
PMV BLD AUTO: 10.1 FL (ref 7–12)
POIKILOCYTES: ABNORMAL
POLYCHROMASIA: ABNORMAL
POTASSIUM REFLEX MAGNESIUM: 3.4 MMOL/L (ref 3.5–5)
RBC # BLD: 4.21 E12/L (ref 3.5–5.5)
SODIUM BLD-SCNC: 134 MMOL/L (ref 132–146)
WBC # BLD: 6.6 E9/L (ref 4.5–11.5)

## 2018-05-06 PROCEDURE — 94640 AIRWAY INHALATION TREATMENT: CPT

## 2018-05-06 PROCEDURE — G8979 MOBILITY GOAL STATUS: HCPCS | Performed by: PHYSICAL THERAPIST

## 2018-05-06 PROCEDURE — 2060000000 HC ICU INTERMEDIATE R&B

## 2018-05-06 PROCEDURE — G8978 MOBILITY CURRENT STATUS: HCPCS | Performed by: PHYSICAL THERAPIST

## 2018-05-06 PROCEDURE — 80048 BASIC METABOLIC PNL TOTAL CA: CPT

## 2018-05-06 PROCEDURE — 73521 X-RAY EXAM HIPS BI 2 VIEWS: CPT

## 2018-05-06 PROCEDURE — 85025 COMPLETE CBC W/AUTO DIFF WBC: CPT

## 2018-05-06 PROCEDURE — 6360000002 HC RX W HCPCS: Performed by: STUDENT IN AN ORGANIZED HEALTH CARE EDUCATION/TRAINING PROGRAM

## 2018-05-06 PROCEDURE — 6370000000 HC RX 637 (ALT 250 FOR IP): Performed by: STUDENT IN AN ORGANIZED HEALTH CARE EDUCATION/TRAINING PROGRAM

## 2018-05-06 PROCEDURE — 2580000003 HC RX 258: Performed by: STUDENT IN AN ORGANIZED HEALTH CARE EDUCATION/TRAINING PROGRAM

## 2018-05-06 PROCEDURE — 83735 ASSAY OF MAGNESIUM: CPT

## 2018-05-06 PROCEDURE — 36415 COLL VENOUS BLD VENIPUNCTURE: CPT

## 2018-05-06 PROCEDURE — 99232 SBSQ HOSP IP/OBS MODERATE 35: CPT | Performed by: STUDENT IN AN ORGANIZED HEALTH CARE EDUCATION/TRAINING PROGRAM

## 2018-05-06 PROCEDURE — 71046 X-RAY EXAM CHEST 2 VIEWS: CPT

## 2018-05-06 PROCEDURE — 2700000000 HC OXYGEN THERAPY PER DAY

## 2018-05-06 PROCEDURE — 94760 N-INVAS EAR/PLS OXIMETRY 1: CPT

## 2018-05-06 PROCEDURE — 97161 PT EVAL LOW COMPLEX 20 MIN: CPT | Performed by: PHYSICAL THERAPIST

## 2018-05-06 RX ORDER — POTASSIUM CHLORIDE 20 MEQ/1
40 TABLET, EXTENDED RELEASE ORAL ONCE
Status: COMPLETED | OUTPATIENT
Start: 2018-05-06 | End: 2018-05-06

## 2018-05-06 RX ADMIN — DESMOPRESSIN ACETATE 20 MCG: 10 SPRAY NASAL at 10:27

## 2018-05-06 RX ADMIN — DESMOPRESSIN ACETATE 400 MCG: 0.1 TABLET ORAL at 21:09

## 2018-05-06 RX ADMIN — APIXABAN 5 MG: 5 TABLET, FILM COATED ORAL at 10:28

## 2018-05-06 RX ADMIN — POTASSIUM CHLORIDE 40 MEQ: 1500 TABLET, EXTENDED RELEASE ORAL at 10:26

## 2018-05-06 RX ADMIN — POTASSIUM CHLORIDE 20 MEQ: 20 TABLET, EXTENDED RELEASE ORAL at 10:30

## 2018-05-06 RX ADMIN — AZITHROMYCIN MONOHYDRATE 250 MG: 500 INJECTION, POWDER, LYOPHILIZED, FOR SOLUTION INTRAVENOUS at 10:27

## 2018-05-06 RX ADMIN — OXYCODONE HYDROCHLORIDE AND ACETAMINOPHEN 1 TABLET: 10; 325 TABLET ORAL at 06:04

## 2018-05-06 RX ADMIN — OXYCODONE HYDROCHLORIDE 10 MG: 10 TABLET, FILM COATED, EXTENDED RELEASE ORAL at 10:27

## 2018-05-06 RX ADMIN — LEVOTHYROXINE SODIUM 75 MCG: 75 TABLET ORAL at 10:28

## 2018-05-06 RX ADMIN — FAMOTIDINE 20 MG: 20 TABLET, FILM COATED ORAL at 21:09

## 2018-05-06 RX ADMIN — AMLODIPINE BESYLATE 10 MG: 10 TABLET ORAL at 10:30

## 2018-05-06 RX ADMIN — Medication 10 ML: at 10:29

## 2018-05-06 RX ADMIN — FAMOTIDINE 20 MG: 20 TABLET, FILM COATED ORAL at 10:27

## 2018-05-06 RX ADMIN — APIXABAN 5 MG: 5 TABLET, FILM COATED ORAL at 21:10

## 2018-05-06 RX ADMIN — FERROUS SULFATE TAB 325 MG (65 MG ELEMENTAL FE) 325 MG: 325 (65 FE) TAB at 10:28

## 2018-05-06 RX ADMIN — BUMETANIDE 1 MG: 1 TABLET ORAL at 10:28

## 2018-05-06 RX ADMIN — DICYCLOMINE HYDROCHLORIDE 10 MG: 10 CAPSULE ORAL at 05:56

## 2018-05-06 RX ADMIN — Medication 10 ML: at 21:30

## 2018-05-06 RX ADMIN — PREDNISONE 40 MG: 20 TABLET ORAL at 10:28

## 2018-05-06 RX ADMIN — METOPROLOL SUCCINATE 150 MG: 50 TABLET, FILM COATED, EXTENDED RELEASE ORAL at 10:29

## 2018-05-06 RX ADMIN — WATER 1 G: 1 INJECTION INTRAMUSCULAR; INTRAVENOUS; SUBCUTANEOUS at 05:56

## 2018-05-06 RX ADMIN — IPRATROPIUM BROMIDE AND ALBUTEROL SULFATE 1 AMPULE: 2.5; .5 SOLUTION RESPIRATORY (INHALATION) at 20:26

## 2018-05-06 RX ADMIN — OXYCODONE HYDROCHLORIDE AND ACETAMINOPHEN 1 TABLET: 10; 325 TABLET ORAL at 20:07

## 2018-05-06 RX ADMIN — DICYCLOMINE HYDROCHLORIDE 10 MG: 10 CAPSULE ORAL at 10:28

## 2018-05-06 RX ADMIN — MOMETASONE FUROATE AND FORMOTEROL FUMARATE DIHYDRATE 2 PUFF: 200; 5 AEROSOL RESPIRATORY (INHALATION) at 10:27

## 2018-05-06 RX ADMIN — OXYCODONE HYDROCHLORIDE 10 MG: 10 TABLET, FILM COATED, EXTENDED RELEASE ORAL at 21:14

## 2018-05-06 RX ADMIN — IPRATROPIUM BROMIDE AND ALBUTEROL SULFATE 1 AMPULE: 2.5; .5 SOLUTION RESPIRATORY (INHALATION) at 11:24

## 2018-05-06 RX ADMIN — DESMOPRESSIN ACETATE 400 MCG: 0.1 TABLET ORAL at 10:28

## 2018-05-06 RX ADMIN — ESCITALOPRAM 20 MG: 10 TABLET, FILM COATED ORAL at 10:28

## 2018-05-06 RX ADMIN — POTASSIUM CHLORIDE 20 MEQ: 20 TABLET, EXTENDED RELEASE ORAL at 22:13

## 2018-05-06 RX ADMIN — DESMOPRESSIN ACETATE 20 MCG: 10 SPRAY NASAL at 21:10

## 2018-05-06 RX ADMIN — MOMETASONE FUROATE AND FORMOTEROL FUMARATE DIHYDRATE 2 PUFF: 200; 5 AEROSOL RESPIRATORY (INHALATION) at 21:10

## 2018-05-06 RX ADMIN — BUMETANIDE 1 MG: 1 TABLET ORAL at 21:09

## 2018-05-06 RX ADMIN — DICYCLOMINE HYDROCHLORIDE 10 MG: 10 CAPSULE ORAL at 17:14

## 2018-05-06 RX ADMIN — DICYCLOMINE HYDROCHLORIDE 10 MG: 10 CAPSULE ORAL at 21:09

## 2018-05-06 ASSESSMENT — PAIN DESCRIPTION - PAIN TYPE: TYPE: CHRONIC PAIN

## 2018-05-06 ASSESSMENT — PAIN SCALES - GENERAL
PAINLEVEL_OUTOF10: 7
PAINLEVEL_OUTOF10: 3
PAINLEVEL_OUTOF10: 0
PAINLEVEL_OUTOF10: 8
PAINLEVEL_OUTOF10: 6
PAINLEVEL_OUTOF10: 9
PAINLEVEL_OUTOF10: 6

## 2018-05-06 ASSESSMENT — PAIN DESCRIPTION - LOCATION: LOCATION: HIP;PELVIS

## 2018-05-07 ENCOUNTER — APPOINTMENT (OUTPATIENT)
Dept: GENERAL RADIOLOGY | Age: 63
DRG: 191 | End: 2018-05-07
Payer: COMMERCIAL

## 2018-05-07 LAB
ANION GAP SERPL CALCULATED.3IONS-SCNC: 14 MMOL/L (ref 7–16)
BASOPHILS ABSOLUTE: 0 E9/L (ref 0–0.2)
BASOPHILS RELATIVE PERCENT: 0 % (ref 0–2)
BUN BLDV-MCNC: 33 MG/DL (ref 8–23)
CALCIUM SERPL-MCNC: 9 MG/DL (ref 8.6–10.2)
CHLORIDE BLD-SCNC: 91 MMOL/L (ref 98–107)
CO2: 34 MMOL/L (ref 22–29)
CREAT SERPL-MCNC: 1.4 MG/DL (ref 0.5–1)
EOSINOPHILS ABSOLUTE: 0 E9/L (ref 0.05–0.5)
EOSINOPHILS RELATIVE PERCENT: 0 % (ref 0–6)
GFR AFRICAN AMERICAN: 46
GFR NON-AFRICAN AMERICAN: 46 ML/MIN/1.73
GLUCOSE BLD-MCNC: 90 MG/DL (ref 74–109)
HCT VFR BLD CALC: 34.9 % (ref 34–48)
HEMOGLOBIN: 10.9 G/DL (ref 11.5–15.5)
IMMATURE GRANULOCYTES #: 0.02 E9/L
IMMATURE GRANULOCYTES %: 0.3 % (ref 0–5)
LYMPHOCYTES ABSOLUTE: 0.71 E9/L (ref 1.5–4)
LYMPHOCYTES RELATIVE PERCENT: 10.4 % (ref 20–42)
MCH RBC QN AUTO: 25.3 PG (ref 26–35)
MCHC RBC AUTO-ENTMCNC: 31.2 % (ref 32–34.5)
MCV RBC AUTO: 81.2 FL (ref 80–99.9)
MONOCYTES ABSOLUTE: 0.82 E9/L (ref 0.1–0.95)
MONOCYTES RELATIVE PERCENT: 12 % (ref 2–12)
NEUTROPHILS ABSOLUTE: 5.28 E9/L (ref 1.8–7.3)
NEUTROPHILS RELATIVE PERCENT: 77.3 % (ref 43–80)
ORGANISM: ABNORMAL
PDW BLD-RTO: 17.2 FL (ref 11.5–15)
PLATELET # BLD: 184 E9/L (ref 130–450)
PMV BLD AUTO: 9.8 FL (ref 7–12)
POTASSIUM REFLEX MAGNESIUM: 4 MMOL/L (ref 3.5–5)
RBC # BLD: 4.3 E12/L (ref 3.5–5.5)
SODIUM BLD-SCNC: 139 MMOL/L (ref 132–146)
URINE CULTURE, ROUTINE: ABNORMAL
URINE CULTURE, ROUTINE: ABNORMAL
WBC # BLD: 6.8 E9/L (ref 4.5–11.5)

## 2018-05-07 PROCEDURE — 97166 OT EVAL MOD COMPLEX 45 MIN: CPT

## 2018-05-07 PROCEDURE — 36415 COLL VENOUS BLD VENIPUNCTURE: CPT

## 2018-05-07 PROCEDURE — 2060000000 HC ICU INTERMEDIATE R&B

## 2018-05-07 PROCEDURE — 6370000000 HC RX 637 (ALT 250 FOR IP): Performed by: STUDENT IN AN ORGANIZED HEALTH CARE EDUCATION/TRAINING PROGRAM

## 2018-05-07 PROCEDURE — G8988 SELF CARE GOAL STATUS: HCPCS

## 2018-05-07 PROCEDURE — 97535 SELF CARE MNGMENT TRAINING: CPT

## 2018-05-07 PROCEDURE — 6360000002 HC RX W HCPCS: Performed by: STUDENT IN AN ORGANIZED HEALTH CARE EDUCATION/TRAINING PROGRAM

## 2018-05-07 PROCEDURE — 80048 BASIC METABOLIC PNL TOTAL CA: CPT

## 2018-05-07 PROCEDURE — 94640 AIRWAY INHALATION TREATMENT: CPT

## 2018-05-07 PROCEDURE — G8987 SELF CARE CURRENT STATUS: HCPCS

## 2018-05-07 PROCEDURE — 85025 COMPLETE CBC W/AUTO DIFF WBC: CPT

## 2018-05-07 PROCEDURE — 99232 SBSQ HOSP IP/OBS MODERATE 35: CPT | Performed by: STUDENT IN AN ORGANIZED HEALTH CARE EDUCATION/TRAINING PROGRAM

## 2018-05-07 PROCEDURE — 2700000000 HC OXYGEN THERAPY PER DAY

## 2018-05-07 PROCEDURE — 73562 X-RAY EXAM OF KNEE 3: CPT

## 2018-05-07 PROCEDURE — 2580000003 HC RX 258: Performed by: STUDENT IN AN ORGANIZED HEALTH CARE EDUCATION/TRAINING PROGRAM

## 2018-05-07 RX ORDER — AZITHROMYCIN 250 MG/1
250 TABLET, FILM COATED ORAL DAILY
Status: DISCONTINUED | OUTPATIENT
Start: 2018-05-08 | End: 2018-05-08 | Stop reason: HOSPADM

## 2018-05-07 RX ORDER — PROMETHAZINE HYDROCHLORIDE AND CODEINE PHOSPHATE 6.25; 1 MG/5ML; MG/5ML
5 SYRUP ORAL 3 TIMES DAILY PRN
Status: DISCONTINUED | OUTPATIENT
Start: 2018-05-07 | End: 2018-05-08 | Stop reason: HOSPADM

## 2018-05-07 RX ORDER — OXYCODONE HCL 20 MG/1
20 TABLET, FILM COATED, EXTENDED RELEASE ORAL EVERY 12 HOURS SCHEDULED
Status: DISCONTINUED | OUTPATIENT
Start: 2018-05-07 | End: 2018-05-08 | Stop reason: HOSPADM

## 2018-05-07 RX ADMIN — BUMETANIDE 1 MG: 1 TABLET ORAL at 20:29

## 2018-05-07 RX ADMIN — Medication 10 ML: at 20:30

## 2018-05-07 RX ADMIN — DICYCLOMINE HYDROCHLORIDE 10 MG: 10 CAPSULE ORAL at 16:46

## 2018-05-07 RX ADMIN — ESCITALOPRAM 20 MG: 10 TABLET, FILM COATED ORAL at 08:30

## 2018-05-07 RX ADMIN — IPRATROPIUM BROMIDE AND ALBUTEROL SULFATE 1 AMPULE: 2.5; .5 SOLUTION RESPIRATORY (INHALATION) at 06:05

## 2018-05-07 RX ADMIN — APIXABAN 5 MG: 5 TABLET, FILM COATED ORAL at 08:30

## 2018-05-07 RX ADMIN — POTASSIUM CHLORIDE 20 MEQ: 20 TABLET, EXTENDED RELEASE ORAL at 20:29

## 2018-05-07 RX ADMIN — OXYCODONE HYDROCHLORIDE AND ACETAMINOPHEN 1 TABLET: 10; 325 TABLET ORAL at 05:35

## 2018-05-07 RX ADMIN — DESMOPRESSIN ACETATE 20 MCG: 10 SPRAY NASAL at 20:27

## 2018-05-07 RX ADMIN — Medication 10 ML: at 08:32

## 2018-05-07 RX ADMIN — FERROUS SULFATE TAB 325 MG (65 MG ELEMENTAL FE) 325 MG: 325 (65 FE) TAB at 08:30

## 2018-05-07 RX ADMIN — DESMOPRESSIN ACETATE 400 MCG: 0.1 TABLET ORAL at 20:29

## 2018-05-07 RX ADMIN — WATER 1 G: 1 INJECTION INTRAMUSCULAR; INTRAVENOUS; SUBCUTANEOUS at 08:31

## 2018-05-07 RX ADMIN — APIXABAN 5 MG: 5 TABLET, FILM COATED ORAL at 20:29

## 2018-05-07 RX ADMIN — POTASSIUM CHLORIDE 20 MEQ: 20 TABLET, EXTENDED RELEASE ORAL at 08:30

## 2018-05-07 RX ADMIN — LEVOTHYROXINE SODIUM 75 MCG: 75 TABLET ORAL at 08:32

## 2018-05-07 RX ADMIN — DESMOPRESSIN ACETATE 20 MCG: 10 SPRAY NASAL at 08:31

## 2018-05-07 RX ADMIN — DICYCLOMINE HYDROCHLORIDE 10 MG: 10 CAPSULE ORAL at 11:14

## 2018-05-07 RX ADMIN — PREDNISONE 40 MG: 20 TABLET ORAL at 08:30

## 2018-05-07 RX ADMIN — DESMOPRESSIN ACETATE 400 MCG: 0.1 TABLET ORAL at 08:29

## 2018-05-07 RX ADMIN — DICYCLOMINE HYDROCHLORIDE 10 MG: 10 CAPSULE ORAL at 20:29

## 2018-05-07 RX ADMIN — AZITHROMYCIN MONOHYDRATE 250 MG: 500 INJECTION, POWDER, LYOPHILIZED, FOR SOLUTION INTRAVENOUS at 11:14

## 2018-05-07 RX ADMIN — OXYCODONE HYDROCHLORIDE 20 MG: 20 TABLET, FILM COATED, EXTENDED RELEASE ORAL at 20:27

## 2018-05-07 RX ADMIN — MOMETASONE FUROATE AND FORMOTEROL FUMARATE DIHYDRATE 2 PUFF: 200; 5 AEROSOL RESPIRATORY (INHALATION) at 08:31

## 2018-05-07 RX ADMIN — PROMETHAZINE HYDROCHLORIDE AND CODEINE PHOSPHATE 5 ML: 6.25; 1 SYRUP ORAL at 19:47

## 2018-05-07 RX ADMIN — FAMOTIDINE 20 MG: 20 TABLET, FILM COATED ORAL at 08:30

## 2018-05-07 RX ADMIN — OXYCODONE HYDROCHLORIDE AND ACETAMINOPHEN 1 TABLET: 10; 325 TABLET ORAL at 14:12

## 2018-05-07 RX ADMIN — FAMOTIDINE 20 MG: 20 TABLET, FILM COATED ORAL at 20:29

## 2018-05-07 RX ADMIN — AMLODIPINE BESYLATE 10 MG: 10 TABLET ORAL at 08:30

## 2018-05-07 RX ADMIN — METOPROLOL SUCCINATE 150 MG: 50 TABLET, FILM COATED, EXTENDED RELEASE ORAL at 08:30

## 2018-05-07 RX ADMIN — BUMETANIDE 1 MG: 1 TABLET ORAL at 08:30

## 2018-05-07 RX ADMIN — OXYCODONE HYDROCHLORIDE 10 MG: 10 TABLET, FILM COATED, EXTENDED RELEASE ORAL at 08:30

## 2018-05-07 RX ADMIN — DICYCLOMINE HYDROCHLORIDE 10 MG: 10 CAPSULE ORAL at 05:32

## 2018-05-07 RX ADMIN — MOMETASONE FUROATE AND FORMOTEROL FUMARATE DIHYDRATE 2 PUFF: 200; 5 AEROSOL RESPIRATORY (INHALATION) at 20:27

## 2018-05-07 ASSESSMENT — PAIN SCALES - GENERAL
PAINLEVEL_OUTOF10: 0
PAINLEVEL_OUTOF10: 0
PAINLEVEL_OUTOF10: 9
PAINLEVEL_OUTOF10: 0
PAINLEVEL_OUTOF10: 9
PAINLEVEL_OUTOF10: 7
PAINLEVEL_OUTOF10: 9
PAINLEVEL_OUTOF10: 6
PAINLEVEL_OUTOF10: 8

## 2018-05-07 ASSESSMENT — PAIN DESCRIPTION - ONSET: ONSET: ON-GOING

## 2018-05-07 ASSESSMENT — PAIN DESCRIPTION - FREQUENCY: FREQUENCY: INTERMITTENT

## 2018-05-07 ASSESSMENT — PAIN DESCRIPTION - LOCATION: LOCATION: HIP;PELVIS

## 2018-05-07 ASSESSMENT — PAIN DESCRIPTION - PROGRESSION: CLINICAL_PROGRESSION: NOT CHANGED

## 2018-05-07 ASSESSMENT — PAIN DESCRIPTION - DESCRIPTORS: DESCRIPTORS: SORE;ACHING

## 2018-05-07 ASSESSMENT — PAIN DESCRIPTION - ORIENTATION: ORIENTATION: RIGHT;LEFT

## 2018-05-07 ASSESSMENT — PAIN DESCRIPTION - PAIN TYPE: TYPE: CHRONIC PAIN

## 2018-05-08 ENCOUNTER — APPOINTMENT (OUTPATIENT)
Dept: GENERAL RADIOLOGY | Age: 63
DRG: 191 | End: 2018-05-08
Payer: COMMERCIAL

## 2018-05-08 VITALS
RESPIRATION RATE: 18 BRPM | TEMPERATURE: 98.4 F | OXYGEN SATURATION: 96 % | DIASTOLIC BLOOD PRESSURE: 78 MMHG | HEART RATE: 68 BPM | SYSTOLIC BLOOD PRESSURE: 120 MMHG | WEIGHT: 157.2 LBS | BODY MASS INDEX: 27.85 KG/M2 | HEIGHT: 63 IN

## 2018-05-08 PROBLEM — J16.0 CAP (COMMUNITY ACQUIRED PNEUMONIA) DUE TO CHLAMYDIA SPECIES: Status: RESOLVED | Noted: 2018-05-05 | Resolved: 2018-05-08

## 2018-05-08 LAB
ANION GAP SERPL CALCULATED.3IONS-SCNC: 11 MMOL/L (ref 7–16)
BASOPHILS ABSOLUTE: 0.01 E9/L (ref 0–0.2)
BASOPHILS RELATIVE PERCENT: 0.1 % (ref 0–2)
BUN BLDV-MCNC: 37 MG/DL (ref 8–23)
CALCIUM SERPL-MCNC: 8.8 MG/DL (ref 8.6–10.2)
CHLORIDE BLD-SCNC: 95 MMOL/L (ref 98–107)
CO2: 32 MMOL/L (ref 22–29)
CREAT SERPL-MCNC: 1.5 MG/DL (ref 0.5–1)
EOSINOPHILS ABSOLUTE: 0 E9/L (ref 0.05–0.5)
EOSINOPHILS RELATIVE PERCENT: 0 % (ref 0–6)
GFR AFRICAN AMERICAN: 43
GFR NON-AFRICAN AMERICAN: 43 ML/MIN/1.73
GLUCOSE BLD-MCNC: 82 MG/DL (ref 74–109)
HCT VFR BLD CALC: 34.2 % (ref 34–48)
HEMOGLOBIN: 10.4 G/DL (ref 11.5–15.5)
IMMATURE GRANULOCYTES #: 0.03 E9/L
IMMATURE GRANULOCYTES %: 0.4 % (ref 0–5)
LYMPHOCYTES ABSOLUTE: 0.76 E9/L (ref 1.5–4)
LYMPHOCYTES RELATIVE PERCENT: 10.6 % (ref 20–42)
MCH RBC QN AUTO: 25.1 PG (ref 26–35)
MCHC RBC AUTO-ENTMCNC: 30.4 % (ref 32–34.5)
MCV RBC AUTO: 82.4 FL (ref 80–99.9)
MONOCYTES ABSOLUTE: 0.97 E9/L (ref 0.1–0.95)
MONOCYTES RELATIVE PERCENT: 13.6 % (ref 2–12)
NEUTROPHILS ABSOLUTE: 5.37 E9/L (ref 1.8–7.3)
NEUTROPHILS RELATIVE PERCENT: 75.3 % (ref 43–80)
PDW BLD-RTO: 17 FL (ref 11.5–15)
PLATELET # BLD: 203 E9/L (ref 130–450)
PMV BLD AUTO: 10.1 FL (ref 7–12)
POTASSIUM REFLEX MAGNESIUM: 4 MMOL/L (ref 3.5–5)
RBC # BLD: 4.15 E12/L (ref 3.5–5.5)
SODIUM BLD-SCNC: 138 MMOL/L (ref 132–146)
WBC # BLD: 7.1 E9/L (ref 4.5–11.5)

## 2018-05-08 PROCEDURE — 2580000003 HC RX 258: Performed by: STUDENT IN AN ORGANIZED HEALTH CARE EDUCATION/TRAINING PROGRAM

## 2018-05-08 PROCEDURE — 80048 BASIC METABOLIC PNL TOTAL CA: CPT

## 2018-05-08 PROCEDURE — 85025 COMPLETE CBC W/AUTO DIFF WBC: CPT

## 2018-05-08 PROCEDURE — 6370000000 HC RX 637 (ALT 250 FOR IP): Performed by: STUDENT IN AN ORGANIZED HEALTH CARE EDUCATION/TRAINING PROGRAM

## 2018-05-08 PROCEDURE — 92523 SPEECH SOUND LANG COMPREHEN: CPT

## 2018-05-08 PROCEDURE — 2700000000 HC OXYGEN THERAPY PER DAY

## 2018-05-08 PROCEDURE — 99239 HOSP IP/OBS DSCHRG MGMT >30: CPT | Performed by: STUDENT IN AN ORGANIZED HEALTH CARE EDUCATION/TRAINING PROGRAM

## 2018-05-08 PROCEDURE — 36415 COLL VENOUS BLD VENIPUNCTURE: CPT

## 2018-05-08 PROCEDURE — 71045 X-RAY EXAM CHEST 1 VIEW: CPT

## 2018-05-08 PROCEDURE — 94640 AIRWAY INHALATION TREATMENT: CPT

## 2018-05-08 RX ORDER — BUMETANIDE 1 MG/1
1 TABLET ORAL DAILY
Qty: 90 TABLET | Refills: 3 | Status: SHIPPED | OUTPATIENT
Start: 2018-05-08 | End: 2018-06-28 | Stop reason: ALTCHOICE

## 2018-05-08 RX ORDER — BUMETANIDE 1 MG/1
1 TABLET ORAL DAILY
Status: DISCONTINUED | OUTPATIENT
Start: 2018-05-09 | End: 2018-05-08 | Stop reason: HOSPADM

## 2018-05-08 RX ORDER — IPRATROPIUM BROMIDE AND ALBUTEROL SULFATE 2.5; .5 MG/3ML; MG/3ML
1 SOLUTION RESPIRATORY (INHALATION) EVERY 4 HOURS PRN
Status: DISCONTINUED | OUTPATIENT
Start: 2018-05-08 | End: 2018-05-08 | Stop reason: HOSPADM

## 2018-05-08 RX ORDER — AZITHROMYCIN 250 MG/1
250 TABLET, FILM COATED ORAL DAILY
Qty: 2 TABLET | Refills: 0 | Status: SHIPPED | OUTPATIENT
Start: 2018-05-09 | End: 2018-05-11

## 2018-05-08 RX ORDER — PREDNISONE 20 MG/1
40 TABLET ORAL DAILY
Qty: 4 TABLET | Refills: 0 | Status: SHIPPED | OUTPATIENT
Start: 2018-05-09 | End: 2018-05-11

## 2018-05-08 RX ADMIN — POLYVINYL ALCOHOL 1 DROP: 14 SOLUTION/ DROPS OPHTHALMIC at 17:45

## 2018-05-08 RX ADMIN — OXYCODONE HYDROCHLORIDE 20 MG: 20 TABLET, FILM COATED, EXTENDED RELEASE ORAL at 09:46

## 2018-05-08 RX ADMIN — IPRATROPIUM BROMIDE AND ALBUTEROL SULFATE 1 AMPULE: 2.5; .5 SOLUTION RESPIRATORY (INHALATION) at 09:39

## 2018-05-08 RX ADMIN — DICYCLOMINE HYDROCHLORIDE 10 MG: 10 CAPSULE ORAL at 11:30

## 2018-05-08 RX ADMIN — DICYCLOMINE HYDROCHLORIDE 10 MG: 10 CAPSULE ORAL at 17:33

## 2018-05-08 RX ADMIN — BUMETANIDE 1 MG: 1 TABLET ORAL at 09:45

## 2018-05-08 RX ADMIN — MOMETASONE FUROATE AND FORMOTEROL FUMARATE DIHYDRATE 2 PUFF: 200; 5 AEROSOL RESPIRATORY (INHALATION) at 09:44

## 2018-05-08 RX ADMIN — LEVOTHYROXINE SODIUM 75 MCG: 75 TABLET ORAL at 09:46

## 2018-05-08 RX ADMIN — OXYCODONE HYDROCHLORIDE AND ACETAMINOPHEN 1 TABLET: 10; 325 TABLET ORAL at 01:26

## 2018-05-08 RX ADMIN — DESMOPRESSIN ACETATE 400 MCG: 0.1 TABLET ORAL at 09:46

## 2018-05-08 RX ADMIN — POTASSIUM CHLORIDE 20 MEQ: 20 TABLET, EXTENDED RELEASE ORAL at 09:44

## 2018-05-08 RX ADMIN — Medication 10 ML: at 09:47

## 2018-05-08 RX ADMIN — PREDNISONE 40 MG: 20 TABLET ORAL at 09:44

## 2018-05-08 RX ADMIN — FAMOTIDINE 20 MG: 20 TABLET, FILM COATED ORAL at 09:45

## 2018-05-08 RX ADMIN — ESCITALOPRAM 20 MG: 10 TABLET, FILM COATED ORAL at 09:45

## 2018-05-08 RX ADMIN — DICYCLOMINE HYDROCHLORIDE 10 MG: 10 CAPSULE ORAL at 06:13

## 2018-05-08 RX ADMIN — APIXABAN 5 MG: 5 TABLET, FILM COATED ORAL at 09:46

## 2018-05-08 RX ADMIN — DESMOPRESSIN ACETATE 20 MCG: 10 SPRAY NASAL at 09:44

## 2018-05-08 RX ADMIN — AZITHROMYCIN 250 MG: 250 TABLET, FILM COATED ORAL at 11:30

## 2018-05-08 RX ADMIN — FERROUS SULFATE TAB 325 MG (65 MG ELEMENTAL FE) 325 MG: 325 (65 FE) TAB at 09:45

## 2018-05-08 RX ADMIN — OXYCODONE HYDROCHLORIDE AND ACETAMINOPHEN 1 TABLET: 10; 325 TABLET ORAL at 17:44

## 2018-05-08 RX ADMIN — AMLODIPINE BESYLATE 10 MG: 10 TABLET ORAL at 09:45

## 2018-05-08 RX ADMIN — METOPROLOL SUCCINATE 150 MG: 50 TABLET, FILM COATED, EXTENDED RELEASE ORAL at 09:46

## 2018-05-08 ASSESSMENT — PAIN SCALES - GENERAL
PAINLEVEL_OUTOF10: 7
PAINLEVEL_OUTOF10: 7
PAINLEVEL_OUTOF10: 9

## 2018-05-08 ASSESSMENT — PAIN DESCRIPTION - FREQUENCY
FREQUENCY: CONTINUOUS
FREQUENCY: CONTINUOUS

## 2018-05-08 ASSESSMENT — PAIN DESCRIPTION - PROGRESSION: CLINICAL_PROGRESSION: NOT CHANGED

## 2018-05-08 ASSESSMENT — PAIN DESCRIPTION - ONSET
ONSET: ON-GOING
ONSET: ON-GOING

## 2018-05-08 ASSESSMENT — PAIN DESCRIPTION - ORIENTATION
ORIENTATION: LEFT
ORIENTATION: LEFT

## 2018-05-08 ASSESSMENT — PAIN DESCRIPTION - DESCRIPTORS
DESCRIPTORS: ACHING;CONSTANT;DISCOMFORT
DESCRIPTORS: ACHING;CONSTANT;DISCOMFORT

## 2018-05-08 ASSESSMENT — PAIN DESCRIPTION - PAIN TYPE
TYPE: CHRONIC PAIN
TYPE: CHRONIC PAIN

## 2018-05-08 ASSESSMENT — PAIN DESCRIPTION - LOCATION
LOCATION: LEG;KNEE
LOCATION: LEG;KNEE

## 2018-05-10 LAB
BLOOD CULTURE, ROUTINE: ABNORMAL
BLOOD CULTURE, ROUTINE: ABNORMAL
CULTURE, BLOOD 2: NORMAL
ORGANISM: ABNORMAL

## 2018-06-05 ENCOUNTER — CARE COORDINATION (OUTPATIENT)
Dept: CASE MANAGEMENT | Age: 63
End: 2018-06-05

## 2018-06-18 ENCOUNTER — OFFICE VISIT (OUTPATIENT)
Dept: CARDIOLOGY CLINIC | Age: 63
End: 2018-06-18
Payer: MEDICARE

## 2018-06-18 VITALS
RESPIRATION RATE: 16 BRPM | HEIGHT: 60 IN | DIASTOLIC BLOOD PRESSURE: 60 MMHG | OXYGEN SATURATION: 98 % | SYSTOLIC BLOOD PRESSURE: 110 MMHG | HEART RATE: 78 BPM

## 2018-06-18 DIAGNOSIS — I48.0 PAF (PAROXYSMAL ATRIAL FIBRILLATION) (HCC): Primary | ICD-10-CM

## 2018-06-18 DIAGNOSIS — I50.9 CONGESTIVE HEART FAILURE, UNSPECIFIED CONGESTIVE HEART FAILURE CHRONICITY, UNSPECIFIED CONGESTIVE HEART FAILURE TYPE: ICD-10-CM

## 2018-06-18 PROCEDURE — 99214 OFFICE O/P EST MOD 30 MIN: CPT | Performed by: INTERNAL MEDICINE

## 2018-06-18 PROCEDURE — 93000 ELECTROCARDIOGRAM COMPLETE: CPT | Performed by: INTERNAL MEDICINE

## 2018-06-18 RX ORDER — VIT B COMP NO.3/FOLIC/C/BIOTIN 1 MG-60 MG
1 TABLET ORAL
COMMUNITY
End: 2018-06-28 | Stop reason: ALTCHOICE

## 2018-06-18 RX ORDER — PREDNISONE 1 MG/1
5 TABLET ORAL DAILY
COMMUNITY
End: 2018-07-31

## 2018-06-18 RX ORDER — DULOXETIN HYDROCHLORIDE 60 MG/1
60 CAPSULE, DELAYED RELEASE ORAL DAILY
COMMUNITY
End: 2019-03-18 | Stop reason: CLARIF

## 2018-06-26 PROBLEM — J44.9 COPD, MODERATE (HCC): Status: ACTIVE | Noted: 2018-06-26

## 2018-06-27 ENCOUNTER — OFFICE VISIT (OUTPATIENT)
Dept: CARDIOLOGY CLINIC | Age: 63
End: 2018-06-27
Payer: MEDICARE

## 2018-06-27 VITALS
WEIGHT: 148 LBS | RESPIRATION RATE: 14 BRPM | SYSTOLIC BLOOD PRESSURE: 132 MMHG | HEIGHT: 60 IN | HEART RATE: 64 BPM | BODY MASS INDEX: 29.06 KG/M2 | DIASTOLIC BLOOD PRESSURE: 78 MMHG

## 2018-06-27 DIAGNOSIS — I50.20 SYSTOLIC CONGESTIVE HEART FAILURE, UNSPECIFIED CONGESTIVE HEART FAILURE CHRONICITY: Primary | ICD-10-CM

## 2018-06-27 PROCEDURE — 93000 ELECTROCARDIOGRAM COMPLETE: CPT | Performed by: INTERNAL MEDICINE

## 2018-06-27 PROCEDURE — 99213 OFFICE O/P EST LOW 20 MIN: CPT | Performed by: INTERNAL MEDICINE

## 2018-07-23 ENCOUNTER — OFFICE VISIT (OUTPATIENT)
Dept: FAMILY MEDICINE CLINIC | Age: 63
End: 2018-07-23
Payer: MEDICARE

## 2018-07-23 VITALS
SYSTOLIC BLOOD PRESSURE: 98 MMHG | OXYGEN SATURATION: 93 % | DIASTOLIC BLOOD PRESSURE: 58 MMHG | RESPIRATION RATE: 18 BRPM | TEMPERATURE: 98.4 F | HEIGHT: 60 IN | HEART RATE: 68 BPM

## 2018-07-23 DIAGNOSIS — I10 ESSENTIAL HYPERTENSION: ICD-10-CM

## 2018-07-23 DIAGNOSIS — M70.22 OLECRANON BURSITIS OF LEFT ELBOW: ICD-10-CM

## 2018-07-23 DIAGNOSIS — E87.6 HYPOKALEMIA: Primary | ICD-10-CM

## 2018-07-23 DIAGNOSIS — J44.9 CHRONIC OBSTRUCTIVE PULMONARY DISEASE, UNSPECIFIED COPD TYPE (HCC): ICD-10-CM

## 2018-07-23 PROCEDURE — 3017F COLORECTAL CA SCREEN DOC REV: CPT | Performed by: STUDENT IN AN ORGANIZED HEALTH CARE EDUCATION/TRAINING PROGRAM

## 2018-07-23 PROCEDURE — 20605 DRAIN/INJ JOINT/BURSA W/O US: CPT | Performed by: STUDENT IN AN ORGANIZED HEALTH CARE EDUCATION/TRAINING PROGRAM

## 2018-07-23 PROCEDURE — 99212 OFFICE O/P EST SF 10 MIN: CPT | Performed by: STUDENT IN AN ORGANIZED HEALTH CARE EDUCATION/TRAINING PROGRAM

## 2018-07-23 PROCEDURE — 99213 OFFICE O/P EST LOW 20 MIN: CPT | Performed by: STUDENT IN AN ORGANIZED HEALTH CARE EDUCATION/TRAINING PROGRAM

## 2018-07-23 PROCEDURE — G8427 DOCREV CUR MEDS BY ELIG CLIN: HCPCS | Performed by: STUDENT IN AN ORGANIZED HEALTH CARE EDUCATION/TRAINING PROGRAM

## 2018-07-23 PROCEDURE — 3023F SPIROM DOC REV: CPT | Performed by: STUDENT IN AN ORGANIZED HEALTH CARE EDUCATION/TRAINING PROGRAM

## 2018-07-23 PROCEDURE — G8417 CALC BMI ABV UP PARAM F/U: HCPCS | Performed by: STUDENT IN AN ORGANIZED HEALTH CARE EDUCATION/TRAINING PROGRAM

## 2018-07-23 PROCEDURE — G8926 SPIRO NO PERF OR DOC: HCPCS | Performed by: STUDENT IN AN ORGANIZED HEALTH CARE EDUCATION/TRAINING PROGRAM

## 2018-07-23 PROCEDURE — 1036F TOBACCO NON-USER: CPT | Performed by: STUDENT IN AN ORGANIZED HEALTH CARE EDUCATION/TRAINING PROGRAM

## 2018-07-23 RX ORDER — FLUTICASONE FUROATE AND VILANTEROL TRIFENATATE 100; 25 UG/1; UG/1
1 POWDER RESPIRATORY (INHALATION) DAILY
Qty: 28 EACH | Refills: 1 | Status: SHIPPED | OUTPATIENT
Start: 2018-07-23 | End: 2018-08-31 | Stop reason: SDUPTHER

## 2018-07-23 RX ORDER — VIT B COMP NO.3/FOLIC/C/BIOTIN 1 MG-60 MG
1 TABLET ORAL
COMMUNITY
End: 2019-05-13

## 2018-07-23 RX ORDER — DICYCLOMINE HYDROCHLORIDE 10 MG/1
10 CAPSULE ORAL
Qty: 120 CAPSULE | Refills: 1 | Status: CANCELLED | OUTPATIENT
Start: 2018-07-23

## 2018-07-23 RX ORDER — POTASSIUM CHLORIDE 1500 MG/1
20 TABLET, FILM COATED, EXTENDED RELEASE ORAL 2 TIMES DAILY
Qty: 60 TABLET | Refills: 0 | Status: SHIPPED | OUTPATIENT
Start: 2018-07-23 | End: 2018-08-29 | Stop reason: SDUPTHER

## 2018-07-23 RX ORDER — AMLODIPINE BESYLATE 5 MG/1
10 TABLET ORAL DAILY
Qty: 30 TABLET | Refills: 1 | Status: SHIPPED | OUTPATIENT
Start: 2018-07-23 | End: 2018-09-14 | Stop reason: SDUPTHER

## 2018-07-23 NOTE — PROGRESS NOTES
S: 65 y.o. female here for L elbow swelling  Was at [facility] for 3 months for AMS. Currently improved. Taking bentyl but not sure why. No abd pain. Bowels normal-hard. Ok w/ stopping this med. breo for COPD. Breathing is at baseline. Wears O2 3 LPM.   Elbow pain for 3 days. Swelling over elbow. No fever. Painful ROM. BP low. Pt is not lightheaded. No CP or SOB. O: VS: BP (!) 98/58 (Site: Right Arm, Position: Sitting, Cuff Size: Medium Adult)   Pulse 68   Temp 98.4 °F (36.9 °C) (Oral)   Resp 18   Ht 5' (1.524 m)   SpO2 93%    General: NAD, alert and interacting appropriately. CV:  RRR, no gallops, rubs, or murmurs    Resp: CTAB    Ext:  Swelling of olecranon bursa. No redness. No warmth. Impression: olecranon bursitis. COPD. Low BP  Plan:   Decrease norvasc from 10 to 5, recheck BP in 1 wk  Refill breo  Olecranon bursitis drained today. I was present for the entire procedure. Attending Physician Statement  I have discussed the case, including pertinent history and exam findings with the resident. I also have seen the patient and performed key portions of the examination. I agree with the documented assessment and plan.

## 2018-07-24 ASSESSMENT — ENCOUNTER SYMPTOMS
HEARTBURN: 0
ABDOMINAL PAIN: 0
SHORTNESS OF BREATH: 0
VOMITING: 0

## 2018-07-24 NOTE — PROGRESS NOTES
 COLONOSCOPY  11/23/2015    severe colitis    COLONOSCOPY  10/24/2016    COLONOSCOPY  07/26/2017    ECHO COMPL W DOP COLOR FLOW  8/16/2015         ECHO COMPLETE  4/22/2013         FRACTURE SURGERY  2010    Tibial right    HEMORRHOID SURGERY  12/08/2016    KYPHOSIS SURGERY      For comp. fx T10    LUMBAR DISC SURGERY      OTHER SURGICAL HISTORY  11/1/11    removal r leg external fixator    TIBIA / FIBIA LENGTHENING      application TSF  7/3/04    UPPER GASTROINTESTINAL ENDOSCOPY  1/4/2016    UPPER GASTROINTESTINAL ENDOSCOPY  07/26/2017       Allergies:    Patient has no known allergies. Social History:   Social History     Social History    Marital status:      Spouse name: N/A    Number of children: N/A    Years of education: N/A     Occupational History    disability -[employer]      Social History Main Topics    Smoking status: Former Smoker     Packs/day: 0.75     Years: 25.00     Types: Cigarettes     Quit date: 9/10/1996    Smokeless tobacco: Never Used    Alcohol use No      Comment: drinks mountain dew and clear pops. \"i drink alot of pop\" maybe 2 liters in a day.  Drug use: No      Comment: 1996 coccaine    Sexual activity: No     Other Topics Concern    Not on file     Social History Narrative    1 child, 2 step children,  for 20 years. Family History:   Family History   Problem Relation Age of Onset    Diabetes Mother     Arthritis Mother     High Blood Pressure Mother     Arthritis Father     High Blood Pressure Father     Diabetes Father     High Blood Pressure Sister     Alcohol Abuse Sister     Substance Abuse Brother     Substance Abuse Sister     Coronary Art Dis Neg Hx        Review of Systems:   Review of Systems   Constitutional: Negative for fever. Respiratory: Negative for shortness of breath. Cardiovascular: Negative for chest pain. Gastrointestinal: Negative for abdominal pain, heartburn and vomiting. times daily (with meals) (Patient taking differently: Take 325 mg by mouth daily (with breakfast) ) 90 tablet 2    famotidine (PEPCID) 20 MG tablet Take 1 tablet by mouth 2 times daily (Patient taking differently: Take 20 mg by mouth 2 times daily Instructed to take am of procedure, 07/28) 60 tablet 5    OXYGEN 4 L/min by Nasal route as needed. BMS       No current facility-administered medications for this visit.          Tian Mary MD PGY-2  Discussed wit Dr. Justin Flores

## 2018-07-26 RX ORDER — SODIUM CHLORIDE 0.9 % (FLUSH) 0.9 %
10 SYRINGE (ML) INJECTION PRN
Status: CANCELLED
Start: 2018-07-26

## 2018-07-26 RX ORDER — SODIUM CHLORIDE 9 MG/ML
INJECTION, SOLUTION INTRAVENOUS CONTINUOUS
Status: CANCELLED
Start: 2018-07-26

## 2018-07-31 DIAGNOSIS — G89.29 CHRONIC BILATERAL LOW BACK PAIN WITHOUT SCIATICA: ICD-10-CM

## 2018-07-31 DIAGNOSIS — M25.552 BILATERAL HIP PAIN: ICD-10-CM

## 2018-07-31 DIAGNOSIS — M87.111 STEROID-INDUCED AVASCULAR NECROSIS OF RIGHT SHOULDER (HCC): Primary | ICD-10-CM

## 2018-07-31 DIAGNOSIS — T38.0X5A STEROID-INDUCED AVASCULAR NECROSIS OF RIGHT SHOULDER (HCC): Primary | ICD-10-CM

## 2018-07-31 DIAGNOSIS — M25.551 BILATERAL HIP PAIN: ICD-10-CM

## 2018-07-31 DIAGNOSIS — M54.50 CHRONIC BILATERAL LOW BACK PAIN WITHOUT SCIATICA: ICD-10-CM

## 2018-07-31 RX ORDER — OXYCODONE HCL 10 MG/1
10 TABLET, FILM COATED, EXTENDED RELEASE ORAL 2 TIMES DAILY
Qty: 60 TABLET | Refills: 0 | Status: SHIPPED | OUTPATIENT
Start: 2018-07-31 | End: 2018-08-31 | Stop reason: SDUPTHER

## 2018-08-01 RX ORDER — VIT B COMP NO.3/FOLIC/C/BIOTIN 1 MG-60 MG
1 TABLET ORAL
Qty: 30 TABLET | OUTPATIENT
Start: 2018-08-01

## 2018-08-03 ENCOUNTER — HOSPITAL ENCOUNTER (OUTPATIENT)
Dept: INFUSION THERAPY | Age: 63
Setting detail: INFUSION SERIES
Discharge: HOME | End: 2018-08-03
Payer: MEDICARE

## 2018-08-03 VITALS
SYSTOLIC BLOOD PRESSURE: 134 MMHG | WEIGHT: 143 LBS | DIASTOLIC BLOOD PRESSURE: 84 MMHG | HEIGHT: 60 IN | HEART RATE: 61 BPM | OXYGEN SATURATION: 100 % | RESPIRATION RATE: 16 BRPM | TEMPERATURE: 99 F | BODY MASS INDEX: 28.07 KG/M2

## 2018-08-03 DIAGNOSIS — N18.30 CHRONIC KIDNEY DISEASE, STAGE III (MODERATE) (HCC): ICD-10-CM

## 2018-08-03 PROCEDURE — 96360 HYDRATION IV INFUSION INIT: CPT

## 2018-08-03 PROCEDURE — 96361 HYDRATE IV INFUSION ADD-ON: CPT

## 2018-08-03 PROCEDURE — 2580000003 HC RX 258: Performed by: INTERNAL MEDICINE

## 2018-08-03 RX ORDER — SODIUM CHLORIDE 0.9 % (FLUSH) 0.9 %
10 SYRINGE (ML) INJECTION PRN
Status: DISCONTINUED | OUTPATIENT
Start: 2018-08-03 | End: 2018-08-03 | Stop reason: ALTCHOICE

## 2018-08-03 RX ORDER — SODIUM CHLORIDE 9 MG/ML
INJECTION, SOLUTION INTRAVENOUS CONTINUOUS
Status: DISCONTINUED | OUTPATIENT
Start: 2018-08-03 | End: 2018-08-03 | Stop reason: ALTCHOICE

## 2018-08-03 RX ORDER — SODIUM CHLORIDE 9 MG/ML
INJECTION, SOLUTION INTRAVENOUS CONTINUOUS
Status: CANCELLED
Start: 2018-08-03

## 2018-08-03 RX ORDER — SODIUM CHLORIDE 0.9 % (FLUSH) 0.9 %
10 SYRINGE (ML) INJECTION PRN
Status: CANCELLED
Start: 2018-08-03

## 2018-08-03 RX ADMIN — SODIUM CHLORIDE: 9 INJECTION, SOLUTION INTRAVENOUS at 13:05

## 2018-08-03 RX ADMIN — SODIUM CHLORIDE: 9 INJECTION, SOLUTION INTRAVENOUS at 11:48

## 2018-08-03 RX ADMIN — Medication 10 ML: at 11:45

## 2018-08-03 ASSESSMENT — PAIN DESCRIPTION - PROGRESSION: CLINICAL_PROGRESSION: NOT CHANGED

## 2018-08-03 ASSESSMENT — PAIN DESCRIPTION - DESCRIPTORS: DESCRIPTORS: ACHING;DISCOMFORT;DULL

## 2018-08-03 ASSESSMENT — PAIN DESCRIPTION - ONSET: ONSET: ON-GOING

## 2018-08-03 ASSESSMENT — PAIN DESCRIPTION - FREQUENCY: FREQUENCY: CONTINUOUS

## 2018-08-03 ASSESSMENT — PAIN DESCRIPTION - LOCATION: LOCATION: HEAD

## 2018-08-03 ASSESSMENT — PAIN SCALES - GENERAL: PAINLEVEL_OUTOF10: 6

## 2018-08-03 ASSESSMENT — PAIN DESCRIPTION - PAIN TYPE: TYPE: CHRONIC PAIN

## 2018-08-03 NOTE — PROGRESS NOTES
Tolerated hydration infusion well. Therapy plan reviewed with patient. Verbalizes understanding. Reviewed AVS with patient, reviewed medication information, verbalizes good knowledge of current plan, and has no signs or symptoms to report at this time.  Declines copy of AVS.

## 2018-08-07 RX ORDER — PREDNISONE 1 MG/1
5 TABLET ORAL DAILY
Qty: 30 TABLET | Refills: 2 | Status: SHIPPED | OUTPATIENT
Start: 2018-08-07 | End: 2018-10-31 | Stop reason: SDUPTHER

## 2018-08-09 DIAGNOSIS — R05.8 PRODUCTIVE COUGH: ICD-10-CM

## 2018-08-09 DIAGNOSIS — D86.0 SARCOIDOSIS OF LUNG (HCC): ICD-10-CM

## 2018-08-09 RX ORDER — DOXYCYCLINE HYCLATE 100 MG
100 TABLET ORAL 2 TIMES DAILY
Qty: 28 TABLET | Refills: 0 | OUTPATIENT
Start: 2018-08-09 | End: 2018-08-23

## 2018-08-09 NOTE — TELEPHONE ENCOUNTER
This is not a chronic medication. She does not need it refilled. If she has new symptoms, she should be seen before antibiotics are prescribed.

## 2018-08-17 DIAGNOSIS — D86.9 SARCOIDOSIS: ICD-10-CM

## 2018-08-17 RX ORDER — PROMETHAZINE HYDROCHLORIDE AND CODEINE PHOSPHATE 6.25; 1 MG/5ML; MG/5ML
10 SYRUP ORAL 3 TIMES DAILY PRN
Qty: 480 ML | Refills: 2 | Status: SHIPPED | OUTPATIENT
Start: 2018-08-17 | End: 2018-10-16 | Stop reason: SDUPTHER

## 2018-08-17 RX ORDER — PROMETHAZINE HYDROCHLORIDE AND CODEINE PHOSPHATE 6.25; 1 MG/5ML; MG/5ML
5 SYRUP ORAL 3 TIMES DAILY PRN
Status: CANCELLED | OUTPATIENT
Start: 2018-08-17

## 2018-08-23 ENCOUNTER — HOSPITAL ENCOUNTER (EMERGENCY)
Age: 63
Discharge: HOME | End: 2018-08-23
Attending: EMERGENCY MEDICINE
Payer: MEDICARE

## 2018-08-23 ENCOUNTER — APPOINTMENT (OUTPATIENT)
Dept: GENERAL RADIOLOGY | Age: 63
End: 2018-08-23
Payer: MEDICARE

## 2018-08-23 ENCOUNTER — TELEPHONE (OUTPATIENT)
Dept: FAMILY MEDICINE CLINIC | Age: 63
End: 2018-08-23

## 2018-08-23 VITALS
WEIGHT: 143 LBS | BODY MASS INDEX: 23.82 KG/M2 | HEART RATE: 55 BPM | SYSTOLIC BLOOD PRESSURE: 148 MMHG | HEIGHT: 65 IN | TEMPERATURE: 97.7 F | OXYGEN SATURATION: 100 % | DIASTOLIC BLOOD PRESSURE: 90 MMHG | RESPIRATION RATE: 20 BRPM

## 2018-08-23 DIAGNOSIS — I10 HYPERTENSION, UNSPECIFIED TYPE: Primary | ICD-10-CM

## 2018-08-23 DIAGNOSIS — G89.29 OTHER CHRONIC PAIN: ICD-10-CM

## 2018-08-23 LAB
ALBUMIN SERPL-MCNC: 3.9 G/DL (ref 3.5–5.2)
ALP BLD-CCNC: 82 U/L (ref 35–104)
ALT SERPL-CCNC: 21 U/L (ref 0–32)
ANION GAP SERPL CALCULATED.3IONS-SCNC: 10 MMOL/L (ref 7–16)
ANION GAP SERPL CALCULATED.3IONS-SCNC: 7 MMOL/L (ref 7–16)
ANISOCYTOSIS: ABNORMAL
AST SERPL-CCNC: 44 U/L (ref 0–31)
BASOPHILS ABSOLUTE: 0.07 E9/L (ref 0–0.2)
BASOPHILS RELATIVE PERCENT: 0.9 % (ref 0–2)
BILIRUB SERPL-MCNC: 0.3 MG/DL (ref 0–1.2)
BUN BLDV-MCNC: 22 MG/DL (ref 8–23)
BUN BLDV-MCNC: 22 MG/DL (ref 8–23)
CALCIUM SERPL-MCNC: 10 MG/DL (ref 8.6–10.2)
CALCIUM SERPL-MCNC: 9.9 MG/DL (ref 8.6–10.2)
CHLORIDE BLD-SCNC: 103 MMOL/L (ref 98–107)
CHLORIDE BLD-SCNC: 104 MMOL/L (ref 98–107)
CO2: 30 MMOL/L (ref 22–29)
CO2: 33 MMOL/L (ref 22–29)
CREAT SERPL-MCNC: 1.1 MG/DL (ref 0.5–1)
CREAT SERPL-MCNC: 1.1 MG/DL (ref 0.5–1)
EKG ATRIAL RATE: 0 BPM
EKG Q-T INTERVAL: 0 MS
EKG QRS DURATION: 0 MS
EKG QTC CALCULATION (BAZETT): 0 MS
EKG R AXIS: 0 DEGREES
EKG T AXIS: 0 DEGREES
EKG VENTRICULAR RATE: 0 BPM
EOSINOPHILS ABSOLUTE: 0.73 E9/L (ref 0.05–0.5)
EOSINOPHILS RELATIVE PERCENT: 9.8 % (ref 0–6)
GFR AFRICAN AMERICAN: >60
GFR AFRICAN AMERICAN: >60
GFR NON-AFRICAN AMERICAN: >60 ML/MIN/1.73
GFR NON-AFRICAN AMERICAN: >60 ML/MIN/1.73
GLUCOSE BLD-MCNC: 72 MG/DL (ref 74–109)
GLUCOSE BLD-MCNC: 91 MG/DL (ref 74–109)
HCT VFR BLD CALC: 36.8 % (ref 34–48)
HEMOGLOBIN: 11.1 G/DL (ref 11.5–15.5)
HYPOCHROMIA: ABNORMAL
LYMPHOCYTES ABSOLUTE: 0.44 E9/L (ref 1.5–4)
LYMPHOCYTES RELATIVE PERCENT: 6.3 % (ref 20–42)
MCH RBC QN AUTO: 25.1 PG (ref 26–35)
MCHC RBC AUTO-ENTMCNC: 30.2 % (ref 32–34.5)
MCV RBC AUTO: 83.3 FL (ref 80–99.9)
MONOCYTES ABSOLUTE: 0.07 E9/L (ref 0.1–0.95)
MONOCYTES RELATIVE PERCENT: 0.9 % (ref 2–12)
NEUTROPHILS ABSOLUTE: 6.07 E9/L (ref 1.8–7.3)
NEUTROPHILS RELATIVE PERCENT: 82.1 % (ref 43–80)
PDW BLD-RTO: 15.2 FL (ref 11.5–15)
PLATELET # BLD: 164 E9/L (ref 130–450)
PMV BLD AUTO: 9.6 FL (ref 7–12)
POLYCHROMASIA: ABNORMAL
POTASSIUM SERPL-SCNC: 4.1 MMOL/L (ref 3.5–5)
POTASSIUM SERPL-SCNC: 5.7 MMOL/L (ref 3.5–5)
RBC # BLD: 4.42 E12/L (ref 3.5–5.5)
SODIUM BLD-SCNC: 143 MMOL/L (ref 132–146)
SODIUM BLD-SCNC: 144 MMOL/L (ref 132–146)
TOTAL CK: 64 U/L (ref 20–180)
TOTAL PROTEIN: 7.1 G/DL (ref 6.4–8.3)
TROPONIN: <0.01 NG/ML (ref 0–0.03)
WBC # BLD: 7.4 E9/L (ref 4.5–11.5)

## 2018-08-23 PROCEDURE — 82550 ASSAY OF CK (CPK): CPT

## 2018-08-23 PROCEDURE — 71045 X-RAY EXAM CHEST 1 VIEW: CPT

## 2018-08-23 PROCEDURE — 80048 BASIC METABOLIC PNL TOTAL CA: CPT

## 2018-08-23 PROCEDURE — 2580000003 HC RX 258: Performed by: EMERGENCY MEDICINE

## 2018-08-23 PROCEDURE — 93005 ELECTROCARDIOGRAM TRACING: CPT | Performed by: EMERGENCY MEDICINE

## 2018-08-23 PROCEDURE — 85025 COMPLETE CBC W/AUTO DIFF WBC: CPT

## 2018-08-23 PROCEDURE — 36415 COLL VENOUS BLD VENIPUNCTURE: CPT

## 2018-08-23 PROCEDURE — 99284 EMERGENCY DEPT VISIT MOD MDM: CPT

## 2018-08-23 PROCEDURE — 84484 ASSAY OF TROPONIN QUANT: CPT

## 2018-08-23 PROCEDURE — 80053 COMPREHEN METABOLIC PANEL: CPT

## 2018-08-23 PROCEDURE — 6370000000 HC RX 637 (ALT 250 FOR IP): Performed by: EMERGENCY MEDICINE

## 2018-08-23 RX ORDER — 0.9 % SODIUM CHLORIDE 0.9 %
500 INTRAVENOUS SOLUTION INTRAVENOUS ONCE
Status: COMPLETED | OUTPATIENT
Start: 2018-08-23 | End: 2018-08-23

## 2018-08-23 RX ORDER — OXYCODONE HYDROCHLORIDE AND ACETAMINOPHEN 5; 325 MG/1; MG/1
1 TABLET ORAL ONCE
Status: DISCONTINUED | OUTPATIENT
Start: 2018-08-23 | End: 2018-08-23

## 2018-08-23 RX ORDER — OXYCODONE HYDROCHLORIDE AND ACETAMINOPHEN 5; 325 MG/1; MG/1
1 TABLET ORAL ONCE
Status: COMPLETED | OUTPATIENT
Start: 2018-08-23 | End: 2018-08-23

## 2018-08-23 RX ADMIN — SODIUM CHLORIDE 500 ML: 9 INJECTION, SOLUTION INTRAVENOUS at 15:37

## 2018-08-23 RX ADMIN — OXYCODONE HYDROCHLORIDE AND ACETAMINOPHEN 1 TABLET: 5; 325 TABLET ORAL at 18:06

## 2018-08-23 ASSESSMENT — PAIN SCALES - GENERAL
PAINLEVEL_OUTOF10: 9
PAINLEVEL_OUTOF10: 10

## 2018-08-23 ASSESSMENT — PAIN DESCRIPTION - ORIENTATION: ORIENTATION: RIGHT;LEFT

## 2018-08-23 ASSESSMENT — PAIN DESCRIPTION - PAIN TYPE: TYPE: CHRONIC PAIN

## 2018-08-23 ASSESSMENT — PAIN DESCRIPTION - LOCATION: LOCATION: ARM;LEG

## 2018-08-23 ASSESSMENT — PAIN DESCRIPTION - FREQUENCY: FREQUENCY: CONTINUOUS

## 2018-08-23 NOTE — ED PROVIDER NOTES
Department of Emergency Medicine   ED  Provider Note  Admit Date/RoomTime: 8/23/2018  2:19 PM  [facility]          History of Present Illness:  8/23/18, Time: 2:32 PM         Ashley Haley is a 65 y.o. female presenting to the ED for hypertension, beginning prior to arrival.  The complaint has been persistent, mild in severity, and worsened by nothing. Pt reports that she has a visiting nurse that comes to evaluate her. Reports that she was told that her blood pressure was elevated; was about 148/100. Reports that she took all of her medications. States that she also has had a headache recently, bilateral arm and leg pain. Reports hat she has had this pain in the past, which has not worsened. Denies recent fall or injury. Pt also reports some mild coughing, sore throat, and mild dizziness. Reports that she uses a wheelchair and walker; does not really waked on her own. Pt is taking therapy for that. Pt is on home oxygen; supposed to use 4 liters but uses 3 liters at home. Lives with . Has hx of atrial fibrillation, back pain, pulmonary sarcoidosis, hypothyroidism, and diabetes insipidus. Also reports that she is incontinent, which is not new. Denies chest pain, shortness of breath, fever, chills, abdominal pain, nausea, emesis, diarrhea, constipation, trauma, fall, injury, and further complaints at this time. Review of Systems:   Pertinent positives and negatives are stated within HPI, all other systems reviewed and are negative.      --------------------------------------------- PAST HISTORY ---------------------------------------------  Past Medical History:  has a past medical history of A-fib ([facility] [facility] Abnormal mammogram; Acute on chronic respiratory failure ([facility] [address] Anemia due to chronic illness; Ankle fracture, left; 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[facility] [address] [facility] Atrial fibrillation ([facility] [address]  Backache; Benign hypertension; Chronic back pain; Chronic kidney disease; Chronic pain disorder; Compression fracture of thoracic vertebra ([address] Deformity of ankle and foot, acquired; Depression; Diabetes insipidus ([address] Diverticulitis; Dry eyes; Encephalopathy acute; GERD (gastroesophageal reflux disease); Hemorrhoids; Hernia; History of blood transfusion; History of Clostridium difficile; Hypothyroidism; Ischemic colitis, enteritis, or enterocolitis ([address] Long-term current use of steroids; Lumbar disc herniation; Myalgia and myositis, unspecified; Nephrosclerosis; Nonunion of fracture; Osteoarthritis; 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[address] Pulmonary hypertension ([address] Pulmonary sarcoidosis ([address] Rhabdomyolysis; Steroid-induced avascular necrosis of shoulder ([address] and Tibia fracture. Past Surgical History:  has a past surgical history that includes fracture surgery (2010); Kyphosis surgery; Lumbar disc surgery; Tibia / fibia lengthening; other surgical history (11/1/11); Abdomen surgery (2008); Echo Complete (4/22/2013); ECHO Compl W Dop Color Flow (8/16/2015); Upper gastrointestinal endoscopy (1/4/2016); Hemorrhoid surgery (12/08/2016); Colonoscopy (2008); Colonoscopy (04/11/2014); Colonoscopy (11/23/2015); Colonoscopy (10/24/2016); Colonoscopy (07/26/2017); and Upper gastrointestinal endoscopy (07/26/2017). Social History:  reports that she quit smoking about 21 years ago. Her smoking use included Cigarettes. She has a 18.75 pack-year smoking history. She has never used smokeless tobacco. She reports that she does not drink alcohol or use drugs. Family History: family history includes Alcohol Abuse in her sister; Arthritis in her father and mother; Diabetes in her father and mother; High Blood Pressure in her father, mother, and sister; Substance Abuse in her brother and sister. The patients home medications have been reviewed. Allergies: Patient has no known allergies.       ---------------------------------------------------PHYSICAL 0. 07 0.00 - 0.20 E9/L    Anisocytosis 1+     Polychromasia 2+     Hypochromia 1+    Comprehensive Metabolic Panel   Result Value Ref Range    Sodium 143 132 - 146 mmol/L    Potassium 5.7 (H) 3.5 - 5.0 mmol/L    Chloride 103 98 - 107 mmol/L    CO2 30 (H) 22 - 29 mmol/L    Anion Gap 10 7 - 16 mmol/L    Glucose 91 74 - 109 mg/dL    BUN 22 8 - 23 mg/dL    CREATININE 1.1 (H) 0.5 - 1.0 mg/dL    GFR Non-African American >60 >=60 mL/min/1.73    GFR African American >60     Calcium 9.9 8.6 - 10.2 mg/dL    Total Protein 7.1 6.4 - 8.3 g/dL    Alb 3.9 3.5 - 5.2 g/dL    Total Bilirubin 0.3 0.0 - 1.2 mg/dL    Alkaline Phosphatase 82 35 - 104 U/L    ALT 21 0 - 32 U/L    AST 44 (H) 0 - 31 U/L   Troponin   Result Value Ref Range    Troponin <0.01 0.00 - 0.03 ng/mL   CK   Result Value Ref Range    Total CK 64 20 - 180 U/L   Basic metabolic panel   Result Value Ref Range    Sodium 144 132 - 146 mmol/L    Potassium 4.1 3.5 - 5.0 mmol/L    Chloride 104 98 - 107 mmol/L    CO2 33 (H) 22 - 29 mmol/L    Anion Gap 7 7 - 16 mmol/L    Glucose 72 (L) 74 - 109 mg/dL    BUN 22 8 - 23 mg/dL    CREATININE 1.1 (H) 0.5 - 1.0 mg/dL    GFR Non-African American >60 >=60 mL/min/1.73    GFR African American >60     Calcium 10.0 8.6 - 10.2 mg/dL       RADIOLOGY:  Interpreted by Radiologist.  XR CHEST PORTABLE   Final Result   ALERT:  THIS IS AN ABNORMAL REPORT   1. Stable, enlarged cardiomediastinal silhouette. .   2. Bibasilar infiltrate/pneumonia with left pleural effusion. 3. Vascular calcifications thoracic aorta. 4. Suspected underlying mild central pulmonary vascular congestion. 5. Severe bilateral glenohumeral osteoarthritis. EKG:  This EKG is signed and interpreted by the EP. Time: 1427  Rate: 67  Rhythm: normal sinus   Interpretation: Normal sinus rhythm with no ST elevation or depression. Minimal T-wave inversions in leads V1 and V2. Normal SC and QTc intervals.   Comparison: stable as compared to patient's most recent EKG and emergency department and will be discharged with instructions to continue taking all of her medications, including her antihypertensives, as instructed. All questions answered. Return indications and follow up discussed. Patient agreeable with the plan and discharge. This patient's ED course included: a personal history and physicial examination and re-evaluation prior to disposition  This patient has remained hemodynamically stable during their ED course. Consultations:                 Counseling: The emergency provider has spoken with the patient and discussed todays results, in addition to providing specific details for the plan of care and counseling regarding the diagnosis and prognosis. Questions are answered at this time and they are agreeable with the plan.       --------------------------------- IMPRESSION AND DISPOSITION ---------------------------------    IMPRESSION  1. Hypertension, unspecified type    2. Other chronic pain        DISPOSITION  Disposition: Discharge to home  Patient condition is fair    SCRIBE ATTESTATION  8/23/18, 2:32 PM.    This note is prepared by Pelon Monson acting as Scribe for González Maxwell MD.    González Maxwell MD:  The scribe's documentation has been prepared under my direction and personally reviewed by me in its entirety. I confirm that the note above accurately reflects all work, treatment, procedures, and medical decision making performed by me.              González Maxwell MD  08/23/18 0119

## 2018-08-23 NOTE — TELEPHONE ENCOUNTER
[facility] nurse, Tiffanie Faster call reports that patient has had increased pain over the last 2 days and DBP>100. She sent patient to ER.

## 2018-08-29 DIAGNOSIS — E87.6 HYPOKALEMIA: ICD-10-CM

## 2018-08-29 RX ORDER — POTASSIUM CHLORIDE 1500 MG/1
20 TABLET, FILM COATED, EXTENDED RELEASE ORAL 2 TIMES DAILY
Qty: 60 TABLET | Refills: 0 | Status: SHIPPED | OUTPATIENT
Start: 2018-08-29 | End: 2018-10-05 | Stop reason: SDUPTHER

## 2018-08-31 ENCOUNTER — HOSPITAL ENCOUNTER (OUTPATIENT)
Age: 63
Discharge: HOME | End: 2018-09-02
Payer: MEDICARE

## 2018-08-31 ENCOUNTER — OFFICE VISIT (OUTPATIENT)
Dept: FAMILY MEDICINE CLINIC | Age: 63
End: 2018-08-31
Payer: MEDICARE

## 2018-08-31 VITALS
DIASTOLIC BLOOD PRESSURE: 72 MMHG | HEART RATE: 72 BPM | BODY MASS INDEX: 28.66 KG/M2 | WEIGHT: 146 LBS | RESPIRATION RATE: 18 BRPM | SYSTOLIC BLOOD PRESSURE: 140 MMHG | HEIGHT: 60 IN | OXYGEN SATURATION: 96 % | TEMPERATURE: 98.6 F

## 2018-08-31 DIAGNOSIS — M17.0 PRIMARY OSTEOARTHRITIS OF BOTH KNEES: Primary | ICD-10-CM

## 2018-08-31 DIAGNOSIS — M87.111 STEROID-INDUCED AVASCULAR NECROSIS OF RIGHT SHOULDER (HCC): ICD-10-CM

## 2018-08-31 DIAGNOSIS — Z76.0 MEDICATION REFILL: ICD-10-CM

## 2018-08-31 DIAGNOSIS — E53.8 LOW SERUM VITAMIN B12: ICD-10-CM

## 2018-08-31 DIAGNOSIS — T38.0X5A STEROID-INDUCED AVASCULAR NECROSIS OF RIGHT SHOULDER (HCC): ICD-10-CM

## 2018-08-31 DIAGNOSIS — Z12.39 BREAST CANCER SCREENING: ICD-10-CM

## 2018-08-31 LAB
FOLATE: 18.5 NG/ML (ref 4.8–24.2)
VITAMIN B-12: 952 PG/ML (ref 211–946)

## 2018-08-31 PROCEDURE — 1036F TOBACCO NON-USER: CPT | Performed by: STUDENT IN AN ORGANIZED HEALTH CARE EDUCATION/TRAINING PROGRAM

## 2018-08-31 PROCEDURE — G8417 CALC BMI ABV UP PARAM F/U: HCPCS | Performed by: STUDENT IN AN ORGANIZED HEALTH CARE EDUCATION/TRAINING PROGRAM

## 2018-08-31 PROCEDURE — 3017F COLORECTAL CA SCREEN DOC REV: CPT | Performed by: STUDENT IN AN ORGANIZED HEALTH CARE EDUCATION/TRAINING PROGRAM

## 2018-08-31 PROCEDURE — 99213 OFFICE O/P EST LOW 20 MIN: CPT | Performed by: STUDENT IN AN ORGANIZED HEALTH CARE EDUCATION/TRAINING PROGRAM

## 2018-08-31 PROCEDURE — G8427 DOCREV CUR MEDS BY ELIG CLIN: HCPCS | Performed by: STUDENT IN AN ORGANIZED HEALTH CARE EDUCATION/TRAINING PROGRAM

## 2018-08-31 PROCEDURE — 99212 OFFICE O/P EST SF 10 MIN: CPT | Performed by: STUDENT IN AN ORGANIZED HEALTH CARE EDUCATION/TRAINING PROGRAM

## 2018-08-31 PROCEDURE — 82746 ASSAY OF FOLIC ACID SERUM: CPT

## 2018-08-31 PROCEDURE — 36415 COLL VENOUS BLD VENIPUNCTURE: CPT | Performed by: FAMILY MEDICINE

## 2018-08-31 PROCEDURE — 82607 VITAMIN B-12: CPT

## 2018-08-31 RX ORDER — DESMOPRESSIN ACETATE 0.2 MG/1
TABLET ORAL
COMMUNITY
Start: 2018-08-13 | End: 2018-08-31 | Stop reason: SDUPTHER

## 2018-08-31 RX ORDER — CALCITONIN SALMON 200 [IU]/.09ML
1 SPRAY, METERED NASAL 2 TIMES DAILY
Qty: 1 BOTTLE | Refills: 5 | Status: SHIPPED | OUTPATIENT
Start: 2018-08-31 | End: 2019-01-07

## 2018-08-31 RX ORDER — OXYCODONE AND ACETAMINOPHEN 10; 325 MG/1; MG/1
1 TABLET ORAL EVERY 12 HOURS PRN
Qty: 30 TABLET | Refills: 0 | Status: SHIPPED | OUTPATIENT
Start: 2018-08-31 | End: 2018-09-18 | Stop reason: SDUPTHER

## 2018-08-31 RX ORDER — OXYCODONE HCL 10 MG/1
10 TABLET, FILM COATED, EXTENDED RELEASE ORAL 2 TIMES DAILY
Qty: 60 TABLET | Refills: 0 | Status: CANCELLED | OUTPATIENT
Start: 2018-08-31 | End: 2018-10-01

## 2018-08-31 RX ORDER — OXYCODONE HCL 10 MG/1
10 TABLET, FILM COATED, EXTENDED RELEASE ORAL 2 TIMES DAILY
Qty: 60 TABLET | Refills: 0 | Status: SHIPPED | OUTPATIENT
Start: 2018-08-31 | End: 2018-10-05 | Stop reason: SDUPTHER

## 2018-08-31 RX ORDER — OXYCODONE HYDROCHLORIDE AND ACETAMINOPHEN 5; 325 MG/1; MG/1
1 TABLET ORAL EVERY 12 HOURS PRN
Qty: 12 TABLET | Refills: 0 | Status: SHIPPED | OUTPATIENT
Start: 2018-08-31 | End: 2018-08-31 | Stop reason: CLARIF

## 2018-08-31 RX ORDER — FLUTICASONE FUROATE AND VILANTEROL TRIFENATATE 100; 25 UG/1; UG/1
1 POWDER RESPIRATORY (INHALATION) DAILY
Qty: 28 EACH | Refills: 1 | Status: SHIPPED | OUTPATIENT
Start: 2018-08-31 | End: 2018-10-31 | Stop reason: SDUPTHER

## 2018-08-31 NOTE — PROGRESS NOTES
([facility] [address] [facility]     Diverticulitis     Dry eyes     Encephalopathy acute 5/5/2017    GERD (gastroesophageal reflux disease)     Hemorrhoids 1/13/2012    Hernia     History of blood transfusion approx 05/2017    History of Clostridium difficile     negative culture 12-15-15; resolved    Hypothyroidism     Ischemic colitis, enteritis, or enterocolitis ([facility] [facility]     Long-term current use of steroids     Lumbar disc herniation     Myalgia and myositis, unspecified     Nephrosclerosis     Nonunion of fracture 2/22/2011    Osteoarthritis     severe    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HCC)     PCWP mean:26.0 PA mean: 24.00; denies any recent issues    Pulmonary hypertension (HCC)     ventricular diastolic function consistent with abnormal relaxation (stage I)    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, f/u w/ PCP    Rhabdomyolysis 5/9/2011    Steroid-induced avascular necrosis of shoulder ([facility] [address] [facility]     Right    Tibia fracture 7/10       Past Surgical History:        Procedure Laterality Date    ABDOMEN SURGERY  2008    ischemic colitis    COLONOSCOPY  2008    healed colitis    COLONOSCOPY  04/11/2014    COLONOSCOPY  11/23/2015    severe colitis    COLONOSCOPY  10/24/2016    COLONOSCOPY  07/26/2017    ECHO COMPL W DOP COLOR FLOW  8/16/2015         ECHO COMPLETE  4/22/2013         FRACTURE SURGERY  2010    Tibial right    HEMORRHOID SURGERY  12/08/2016    KYPHOSIS SURGERY      For comp. fx T10    LUMBAR DISC SURGERY      OTHER SURGICAL HISTORY  11/1/11    removal r leg external fixator    TIBIA / FIBIA LENGTHENING      application TSF  9/0/78    UPPER GASTROINTESTINAL ENDOSCOPY  1/4/2016    UPPER GASTROINTESTINAL ENDOSCOPY  07/26/2017       Allergies:    Patient has no known allergies.     Social History:   Social History     Social History    Marital status:      Spouse name: N/A    Number of children: N/A    Years of education: N/A     Occupational History    disability is to continue oxycontin BID. I oredred Percocet for breakthrough pain. It was explained to her that this prescription must last her a month. Chronic pain consult will be considered in the future. - DME Order for (Specify) as OP  - oxyCODONE-acetaminophen (PERCOCET)  MG per tablet; Take 1 tablet by mouth every 12 hours as needed for Pain for up to 30 days. Intended supply: 30 days. 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 Percoet for breakthough pain. - oxyCODONE (OXYCONTIN) 10 MG extended release tablet; Take 1 tablet by mouth 2 times daily for 31 days. Mable Rachael Date: 8/31/18  Dispense: 60 tablet; Refill: 0    Hematachezia  -Resolved for now. Colonoscopy last year show diverticular disease, so possibly this is the cause. Not due for colonoscopy for another four years.  -Will watch for now    HTN  -BP slightly elevated today. Encouraged her to take amlodipine, as well as continue with Toprol. Medication Refill  - BREO ELLIPTA 100-25 MCG/INH AEPB inhaler; Inhale 1 puff into the lungs daily  Dispense: 28 each; Refill: 1  - calcitonin (MIACALCIN) 200 UNIT/ACT nasal spray; 1 spray by Nasal route 2 times daily  Dispense: 1 Bottle; Refill: 5    Vitamin B12  -Ordered Vit B12 and Folate levels  -Continue taking multivitamine    Breast cancer screening  - [facility] DIGITAL SCREEN W CAD BILATERAL; Future        Return to Office: No Follow-up on file. Medication List:    Current Outpatient Prescriptions   Medication Sig Dispense Refill    potassium chloride (KLOR-CON M) 20 MEQ TBCR extended release tablet Take 1 tablet by mouth 2 times daily 60 tablet 0    promethazine-codeine (PHENERGAN WITH CODEINE) 6.25-10 MG/5ML syrup Take 10 mLs by mouth 3 times daily as needed for Cough for up to 16 days. . 480 mL 2    predniSONE (DELTASONE) 5 MG tablet Take 1 tablet by mouth daily 30 tablet 2    oxyCODONE (OXYCONTIN) 10 MG extended release tablet

## 2018-08-31 NOTE — PROGRESS NOTES
Attending Physician Statement    S:   Chief Complaint   Patient presents with    Leg Pain     right leg> left ,  Bilateral shoulders     Rectal Bleeding     3 days ago none since     Hypertension     has not taked norvac X 3 days       Pain is worse and she has no medication for breakthrough. Wants wheelchair with belt. O: Blood pressure (!) 140/72, pulse 72, temperature 98.6 °F (37 °C), temperature source Oral, resp. rate 18, height 5' (1.524 m), weight 146 lb (66.2 kg), SpO2 96 %, not currently breastfeeding. Exam:   Heart - RRR   Lungs - clear   abd- large epigastric hernia   Knees - swelling with L > R, decreased ROM  A: Chronic pain, HTN  P:  Constipation treatment, recommend colonoscopy   Labs   Percocet for breakthrough, #30 - must last at least one month   Consider pain management   Follow-up as ordered    I have discussed the case, including pertinent history and exam findings with the resident. I agree with the documented assessment and plan.

## 2018-09-06 ENCOUNTER — TELEPHONE (OUTPATIENT)
Dept: FAMILY MEDICINE CLINIC | Age: 63
End: 2018-09-06

## 2018-09-07 ENCOUNTER — TELEPHONE (OUTPATIENT)
Dept: FAMILY MEDICINE CLINIC | Age: 63
End: 2018-09-07

## 2018-09-07 NOTE — TELEPHONE ENCOUNTER
Received fax from [address] [place] [address] they are not contacted with Medicare to provide the wheel chair. I have call patient and left  multiple voicemail request to return call to discuss. Awaiting a return call.

## 2018-09-12 ASSESSMENT — ENCOUNTER SYMPTOMS: SHORTNESS OF BREATH: 0

## 2018-09-13 RX ORDER — FAMOTIDINE 20 MG/1
20 TABLET, FILM COATED ORAL 2 TIMES DAILY
Qty: 60 TABLET | Refills: 5 | Status: SHIPPED | OUTPATIENT
Start: 2018-09-13 | End: 2019-01-07 | Stop reason: SDUPTHER

## 2018-09-14 DIAGNOSIS — I10 ESSENTIAL HYPERTENSION: ICD-10-CM

## 2018-09-14 RX ORDER — AMLODIPINE BESYLATE 5 MG/1
10 TABLET ORAL DAILY
Qty: 30 TABLET | Refills: 1 | Status: SHIPPED | OUTPATIENT
Start: 2018-09-14 | End: 2018-11-29 | Stop reason: SDUPTHER

## 2018-09-18 ENCOUNTER — TELEPHONE (OUTPATIENT)
Dept: FAMILY MEDICINE CLINIC | Age: 63
End: 2018-09-18

## 2018-09-18 DIAGNOSIS — M17.0 PRIMARY OSTEOARTHRITIS OF BOTH KNEES: ICD-10-CM

## 2018-09-18 RX ORDER — OXYCODONE AND ACETAMINOPHEN 10; 325 MG/1; MG/1
1 TABLET ORAL EVERY 12 HOURS PRN
Qty: 60 TABLET | Refills: 0 | Status: SHIPPED | OUTPATIENT
Start: 2018-09-18 | End: 2018-10-16 | Stop reason: SDUPTHER

## 2018-09-24 ENCOUNTER — OFFICE VISIT (OUTPATIENT)
Dept: FAMILY MEDICINE CLINIC | Age: 63
End: 2018-09-24
Payer: MEDICARE

## 2018-09-24 VITALS
RESPIRATION RATE: 18 BRPM | OXYGEN SATURATION: 100 % | TEMPERATURE: 98.5 F | SYSTOLIC BLOOD PRESSURE: 122 MMHG | HEART RATE: 60 BPM | DIASTOLIC BLOOD PRESSURE: 72 MMHG

## 2018-09-24 DIAGNOSIS — Z23 NEED FOR INFLUENZA VACCINATION: Primary | ICD-10-CM

## 2018-09-24 DIAGNOSIS — J96.11 CHRONIC RESPIRATORY FAILURE WITH HYPOXIA (HCC): Chronic | ICD-10-CM

## 2018-09-24 DIAGNOSIS — Z23 NEED FOR SHINGLES VACCINE: ICD-10-CM

## 2018-09-24 DIAGNOSIS — F33.41 RECURRENT MAJOR DEPRESSIVE DISORDER, IN PARTIAL REMISSION (HCC): ICD-10-CM

## 2018-09-24 DIAGNOSIS — E23.2 DIABETES INSIPIDUS, NEUROHYPOPHYSEAL (HCC): ICD-10-CM

## 2018-09-24 DIAGNOSIS — H60.502 ACUTE OTITIS EXTERNA OF LEFT EAR, UNSPECIFIED TYPE: ICD-10-CM

## 2018-09-24 PROBLEM — J44.1 COPD EXACERBATION (HCC): Status: RESOLVED | Noted: 2017-02-06 | Resolved: 2018-09-24

## 2018-09-24 PROBLEM — J47.9 BRONCHIECTASIS WITHOUT COMPLICATION (HCC): Status: RESOLVED | Noted: 2017-09-21 | Resolved: 2018-09-24

## 2018-09-24 PROBLEM — R06.00 DYSPNEA: Status: RESOLVED | Noted: 2018-02-08 | Resolved: 2018-09-24

## 2018-09-24 PROCEDURE — 99212 OFFICE O/P EST SF 10 MIN: CPT | Performed by: FAMILY MEDICINE

## 2018-09-24 PROCEDURE — G8427 DOCREV CUR MEDS BY ELIG CLIN: HCPCS | Performed by: FAMILY MEDICINE

## 2018-09-24 PROCEDURE — 4130F TOPICAL PREP RX AOE: CPT | Performed by: FAMILY MEDICINE

## 2018-09-24 PROCEDURE — 6360000002 HC RX W HCPCS

## 2018-09-24 PROCEDURE — 1036F TOBACCO NON-USER: CPT | Performed by: FAMILY MEDICINE

## 2018-09-24 PROCEDURE — G8417 CALC BMI ABV UP PARAM F/U: HCPCS | Performed by: FAMILY MEDICINE

## 2018-09-24 PROCEDURE — 99214 OFFICE O/P EST MOD 30 MIN: CPT | Performed by: FAMILY MEDICINE

## 2018-09-24 PROCEDURE — G0008 ADMIN INFLUENZA VIRUS VAC: HCPCS

## 2018-09-24 PROCEDURE — 90686 IIV4 VACC NO PRSV 0.5 ML IM: CPT

## 2018-09-24 PROCEDURE — 3017F COLORECTAL CA SCREEN DOC REV: CPT | Performed by: FAMILY MEDICINE

## 2018-09-24 RX ORDER — POTASSIUM CHLORIDE 20 MEQ/1
TABLET, EXTENDED RELEASE ORAL
COMMUNITY
Start: 2018-08-29 | End: 2018-09-24

## 2018-09-24 RX ORDER — DESMOPRESSIN ACETATE 0.2 MG/1
0.2 TABLET ORAL DAILY
COMMUNITY
Start: 2018-09-14 | End: 2019-05-13

## 2018-09-24 ASSESSMENT — PATIENT HEALTH QUESTIONNAIRE - PHQ9
2. FEELING DOWN, DEPRESSED OR HOPELESS: 0
1. LITTLE INTEREST OR PLEASURE IN DOING THINGS: 0
SUM OF ALL RESPONSES TO PHQ QUESTIONS 1-9: 0
SUM OF ALL RESPONSES TO PHQ9 QUESTIONS 1 & 2: 0
SUM OF ALL RESPONSES TO PHQ QUESTIONS 1-9: 0

## 2018-09-25 NOTE — PROGRESS NOTES
enterocolitis ([facility] [address] Long-term current use of steroids; Lumbar disc herniation; Myalgia and myositis, unspecified; Nephrosclerosis; Nonunion of fracture; Osteoarthritis; 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[address] Pulmonary hypertension ([address] Pulmonary sarcoidosis ([address] Rhabdomyolysis; Steroid-induced avascular necrosis of shoulder ([address] and Tibia fracture. Past Surgical history :    Past Surgical History:   Procedure Laterality Date    ABDOMEN SURGERY  2008    ischemic colitis    COLONOSCOPY  2008    healed colitis    COLONOSCOPY  04/11/2014    COLONOSCOPY  11/23/2015    severe colitis    COLONOSCOPY  10/24/2016    COLONOSCOPY  07/26/2017    ECHO COMPL W DOP COLOR FLOW  8/16/2015         ECHO COMPLETE  4/22/2013         FRACTURE SURGERY  2010    Tibial right    HEMORRHOID SURGERY  12/08/2016    KYPHOSIS SURGERY      For comp.  fx T10    LUMBAR DISC SURGERY      OTHER SURGICAL HISTORY  11/1/11    removal r leg external fixator    TIBIA / [place] LENGTHENING      application TSF  0/5/96    UPPER GASTROINTESTINAL ENDOSCOPY  1/4/2016    UPPER GASTROINTESTINAL ENDOSCOPY  07/26/2017       Family Medical History :   Family History   Problem Relation Age of Onset    Diabetes Mother     Arthritis Mother     High Blood Pressure Mother     Arthritis Father     High Blood Pressure Father     Diabetes Father     High Blood Pressure Sister     Alcohol Abuse Sister     Substance Abuse Brother     Substance Abuse Sister     Coronary Art Dis Neg Hx        Social History :   Social History     Social History    Marital status:      Spouse name: N/A    Number of children: N/A    Years of education: N/A     Occupational History    disability -[employer]      Social History Main Topics    Smoking status: Former Smoker     Packs/day: 0.75     Years: 25.00     Types: Cigarettes     Quit date: 9/10/1996    Smokeless tobacco: Never Used    Alcohol use No      Comment: drinks mountain dew and clear

## 2018-10-04 DIAGNOSIS — M17.0 PRIMARY OSTEOARTHRITIS OF BOTH KNEES: ICD-10-CM

## 2018-10-04 RX ORDER — OXYCODONE AND ACETAMINOPHEN 10; 325 MG/1; MG/1
1 TABLET ORAL EVERY 12 HOURS PRN
Qty: 60 TABLET | Refills: 0 | OUTPATIENT
Start: 2018-10-04 | End: 2018-11-03

## 2018-10-05 DIAGNOSIS — E87.6 HYPOKALEMIA: ICD-10-CM

## 2018-10-05 DIAGNOSIS — M87.111 STEROID-INDUCED AVASCULAR NECROSIS OF RIGHT SHOULDER (HCC): ICD-10-CM

## 2018-10-05 DIAGNOSIS — T38.0X5A STEROID-INDUCED AVASCULAR NECROSIS OF RIGHT SHOULDER (HCC): ICD-10-CM

## 2018-10-05 RX ORDER — POTASSIUM CHLORIDE 1500 MG/1
20 TABLET, FILM COATED, EXTENDED RELEASE ORAL 2 TIMES DAILY
Qty: 60 TABLET | Refills: 0 | Status: SHIPPED | OUTPATIENT
Start: 2018-10-05 | End: 2018-10-31 | Stop reason: SDUPTHER

## 2018-10-05 RX ORDER — OXYCODONE HCL 10 MG/1
10 TABLET, FILM COATED, EXTENDED RELEASE ORAL 2 TIMES DAILY
Qty: 60 TABLET | Refills: 0 | Status: SHIPPED | OUTPATIENT
Start: 2018-10-05 | End: 2018-11-05 | Stop reason: SDUPTHER

## 2018-10-11 ENCOUNTER — HOSPITAL ENCOUNTER (OUTPATIENT)
Dept: GENERAL RADIOLOGY | Age: 63
Discharge: HOME | End: 2018-10-13
Payer: MEDICARE

## 2018-10-11 ENCOUNTER — TELEPHONE (OUTPATIENT)
Dept: FAMILY MEDICINE CLINIC | Age: 63
End: 2018-10-11

## 2018-10-11 DIAGNOSIS — M81.0 OSTEOPOROSIS WITHOUT CURRENT PATHOLOGICAL FRACTURE, UNSPECIFIED OSTEOPOROSIS TYPE: ICD-10-CM

## 2018-10-11 DIAGNOSIS — Z13.820 SCREENING FOR OSTEOPOROSIS: ICD-10-CM

## 2018-10-11 DIAGNOSIS — Z12.39 BREAST CANCER SCREENING: ICD-10-CM

## 2018-10-11 PROCEDURE — 77063 BREAST TOMOSYNTHESIS BI: CPT

## 2018-10-11 PROCEDURE — 77080 DXA BONE DENSITY AXIAL: CPT

## 2018-10-16 DIAGNOSIS — D86.9 SARCOIDOSIS: ICD-10-CM

## 2018-10-16 DIAGNOSIS — M17.0 PRIMARY OSTEOARTHRITIS OF BOTH KNEES: ICD-10-CM

## 2018-10-16 RX ORDER — PROMETHAZINE HYDROCHLORIDE AND CODEINE PHOSPHATE 6.25; 1 MG/5ML; MG/5ML
10 SYRUP ORAL 3 TIMES DAILY PRN
Qty: 480 ML | Refills: 2 | Status: SHIPPED | OUTPATIENT
Start: 2018-10-16 | End: 2018-12-14 | Stop reason: SDUPTHER

## 2018-10-16 RX ORDER — OXYCODONE AND ACETAMINOPHEN 10; 325 MG/1; MG/1
1 TABLET ORAL EVERY 12 HOURS PRN
Qty: 60 TABLET | Refills: 0 | Status: SHIPPED | OUTPATIENT
Start: 2018-10-16 | End: 2018-11-14 | Stop reason: SDUPTHER

## 2018-10-31 DIAGNOSIS — E87.6 HYPOKALEMIA: ICD-10-CM

## 2018-10-31 RX ORDER — PREDNISONE 1 MG/1
5 TABLET ORAL DAILY
Qty: 30 TABLET | Refills: 2 | Status: SHIPPED | OUTPATIENT
Start: 2018-10-31 | End: 2019-01-07 | Stop reason: SDUPTHER

## 2018-10-31 RX ORDER — POTASSIUM CHLORIDE 1500 MG/1
20 TABLET, FILM COATED, EXTENDED RELEASE ORAL 2 TIMES DAILY
Qty: 60 TABLET | Refills: 0 | Status: SHIPPED | OUTPATIENT
Start: 2018-10-31 | End: 2018-12-10 | Stop reason: SDUPTHER

## 2018-10-31 RX ORDER — FLUTICASONE FUROATE AND VILANTEROL TRIFENATATE 100; 25 UG/1; UG/1
1 POWDER RESPIRATORY (INHALATION) DAILY
Qty: 28 EACH | Refills: 1 | Status: SHIPPED | OUTPATIENT
Start: 2018-10-31 | End: 2019-01-07 | Stop reason: SDUPTHER

## 2018-11-05 DIAGNOSIS — M87.111 STEROID-INDUCED AVASCULAR NECROSIS OF RIGHT SHOULDER (HCC): ICD-10-CM

## 2018-11-05 DIAGNOSIS — T38.0X5A STEROID-INDUCED AVASCULAR NECROSIS OF RIGHT SHOULDER (HCC): ICD-10-CM

## 2018-11-05 RX ORDER — FERROUS SULFATE 325(65) MG
325 TABLET ORAL
Qty: 90 TABLET | Refills: 2 | Status: SHIPPED | OUTPATIENT
Start: 2018-11-05 | End: 2019-01-07 | Stop reason: SDUPTHER

## 2018-11-05 RX ORDER — METOPROLOL SUCCINATE 50 MG/1
50 TABLET, EXTENDED RELEASE ORAL DAILY
Qty: 30 TABLET | Refills: 5 | Status: SHIPPED | OUTPATIENT
Start: 2018-11-05 | End: 2019-03-18

## 2018-11-05 RX ORDER — METOPROLOL SUCCINATE 100 MG/1
100 TABLET, EXTENDED RELEASE ORAL DAILY
Qty: 30 TABLET | Refills: 5 | Status: SHIPPED | OUTPATIENT
Start: 2018-11-05 | End: 2019-03-18 | Stop reason: SDUPTHER

## 2018-11-05 RX ORDER — OXYCODONE HCL 10 MG/1
10 TABLET, FILM COATED, EXTENDED RELEASE ORAL 2 TIMES DAILY
Qty: 60 TABLET | Refills: 0 | Status: SHIPPED | OUTPATIENT
Start: 2018-11-05 | End: 2018-12-06

## 2018-11-14 DIAGNOSIS — M17.0 PRIMARY OSTEOARTHRITIS OF BOTH KNEES: ICD-10-CM

## 2018-11-14 RX ORDER — OXYCODONE AND ACETAMINOPHEN 10; 325 MG/1; MG/1
1 TABLET ORAL EVERY 12 HOURS PRN
Qty: 60 TABLET | Refills: 0 | Status: SHIPPED | OUTPATIENT
Start: 2018-11-14 | End: 2018-12-11 | Stop reason: SDUPTHER

## 2018-11-26 ENCOUNTER — OFFICE VISIT (OUTPATIENT)
Dept: FAMILY MEDICINE CLINIC | Age: 63
End: 2018-11-26
Payer: MEDICARE

## 2018-11-26 ENCOUNTER — HOSPITAL ENCOUNTER (OUTPATIENT)
Age: 63
Discharge: HOME | End: 2018-11-28
Payer: MEDICARE

## 2018-11-26 VITALS
RESPIRATION RATE: 18 BRPM | TEMPERATURE: 97.8 F | DIASTOLIC BLOOD PRESSURE: 73 MMHG | BODY MASS INDEX: 27.48 KG/M2 | OXYGEN SATURATION: 100 % | HEART RATE: 64 BPM | SYSTOLIC BLOOD PRESSURE: 117 MMHG | HEIGHT: 60 IN | WEIGHT: 140 LBS

## 2018-11-26 DIAGNOSIS — N18.30 CHRONIC KIDNEY DISEASE, STAGE III (MODERATE) (HCC): ICD-10-CM

## 2018-11-26 DIAGNOSIS — M54.50 CHRONIC BILATERAL LOW BACK PAIN WITHOUT SCIATICA: ICD-10-CM

## 2018-11-26 DIAGNOSIS — T38.0X5A STEROID-INDUCED AVASCULAR NECROSIS OF RIGHT SHOULDER (HCC): Primary | ICD-10-CM

## 2018-11-26 DIAGNOSIS — D86.9 SARCOIDOSIS: ICD-10-CM

## 2018-11-26 DIAGNOSIS — M87.111 STEROID-INDUCED AVASCULAR NECROSIS OF RIGHT SHOULDER (HCC): Primary | ICD-10-CM

## 2018-11-26 DIAGNOSIS — G89.29 CHRONIC BILATERAL LOW BACK PAIN WITHOUT SCIATICA: ICD-10-CM

## 2018-11-26 DIAGNOSIS — E78.2 MIXED HYPERLIPIDEMIA: ICD-10-CM

## 2018-11-26 LAB
ALBUMIN SERPL-MCNC: 4 G/DL (ref 3.5–5.2)
ALP BLD-CCNC: 95 U/L (ref 35–104)
ALT SERPL-CCNC: 29 U/L (ref 0–32)
AMPHETAMINE SCREEN, URINE: NOT DETECTED
ANION GAP SERPL CALCULATED.3IONS-SCNC: 9 MMOL/L (ref 7–16)
AST SERPL-CCNC: 30 U/L (ref 0–31)
ATYPICAL LYMPHOCYTE RELATIVE PERCENT: 3.5 % (ref 0–4)
BACTERIA: ABNORMAL /HPF
BARBITURATE SCREEN URINE: NOT DETECTED
BASOPHILS ABSOLUTE: 0 E9/L (ref 0–0.2)
BASOPHILS RELATIVE PERCENT: 0.3 % (ref 0–2)
BENZODIAZEPINE SCREEN, URINE: NOT DETECTED
BILIRUB SERPL-MCNC: 0.2 MG/DL (ref 0–1.2)
BILIRUBIN URINE: NEGATIVE
BLOOD, URINE: ABNORMAL
BUN BLDV-MCNC: 22 MG/DL (ref 8–23)
CALCIUM SERPL-MCNC: 9.9 MG/DL (ref 8.6–10.2)
CANNABINOID SCREEN URINE: NOT DETECTED
CHLORIDE BLD-SCNC: 100 MMOL/L (ref 98–107)
CLARITY: CLEAR
CO2: 35 MMOL/L (ref 22–29)
COCAINE METABOLITE SCREEN URINE: NOT DETECTED
COLOR: YELLOW
CREAT SERPL-MCNC: 1 MG/DL (ref 0.5–1)
CREATININE URINE: 147 MG/DL (ref 29–226)
EOSINOPHILS ABSOLUTE: 0.24 E9/L (ref 0.05–0.5)
EOSINOPHILS RELATIVE PERCENT: 3.5 % (ref 0–6)
GFR AFRICAN AMERICAN: >60
GFR NON-AFRICAN AMERICAN: >60 ML/MIN/1.73
GLUCOSE BLD-MCNC: 76 MG/DL (ref 74–99)
GLUCOSE URINE: NEGATIVE MG/DL
HCT VFR BLD CALC: 37.1 % (ref 34–48)
HEMOGLOBIN: 11.1 G/DL (ref 11.5–15.5)
HYPOCHROMIA: ABNORMAL
KETONES, URINE: NEGATIVE MG/DL
LEUKOCYTE ESTERASE, URINE: ABNORMAL
LYMPHOCYTES ABSOLUTE: 0.62 E9/L (ref 1.5–4)
LYMPHOCYTES RELATIVE PERCENT: 5.2 % (ref 20–42)
MAGNESIUM: 2.2 MG/DL (ref 1.6–2.6)
MCH RBC QN AUTO: 25.5 PG (ref 26–35)
MCHC RBC AUTO-ENTMCNC: 29.9 % (ref 32–34.5)
MCV RBC AUTO: 85.1 FL (ref 80–99.9)
METHADONE SCREEN, URINE: NOT DETECTED
MONOCYTES ABSOLUTE: 0.21 E9/L (ref 0.1–0.95)
MONOCYTES RELATIVE PERCENT: 2.6 % (ref 2–12)
NEUTROPHILS ABSOLUTE: 5.87 E9/L (ref 1.8–7.3)
NEUTROPHILS RELATIVE PERCENT: 85.2 % (ref 43–80)
NITRITE, URINE: NEGATIVE
OPIATE SCREEN URINE: POSITIVE
OVALOCYTES: ABNORMAL
PDW BLD-RTO: 13.2 FL (ref 11.5–15)
PH UA: 7 (ref 5–9)
PHENCYCLIDINE SCREEN URINE: NOT DETECTED
PHOSPHORUS: 3.3 MG/DL (ref 2.5–4.5)
PLATELET # BLD: 174 E9/L (ref 130–450)
PMV BLD AUTO: 10.9 FL (ref 7–12)
POIKILOCYTES: ABNORMAL
POTASSIUM SERPL-SCNC: 4.7 MMOL/L (ref 3.5–5)
PROPOXYPHENE SCREEN: NOT DETECTED
PROTEIN PROTEIN: 15 MG/DL (ref 0–12)
PROTEIN UA: NEGATIVE MG/DL
PROTEIN/CREAT RATIO: 0.1
PROTEIN/CREAT RATIO: 0.1 (ref 0–0.2)
RBC # BLD: 4.36 E12/L (ref 3.5–5.5)
RBC UA: ABNORMAL /HPF (ref 0–2)
SODIUM BLD-SCNC: 144 MMOL/L (ref 132–146)
SPECIFIC GRAVITY UA: 1.01 (ref 1–1.03)
STOMATOCYTES: ABNORMAL
TOTAL PROTEIN: 7 G/DL (ref 6.4–8.3)
URIC ACID, SERUM: 5.3 MG/DL (ref 2.4–5.7)
UROBILINOGEN, URINE: 0.2 E.U./DL
WBC # BLD: 6.9 E9/L (ref 4.5–11.5)
WBC UA: ABNORMAL /HPF (ref 0–5)

## 2018-11-26 PROCEDURE — 1036F TOBACCO NON-USER: CPT | Performed by: FAMILY MEDICINE

## 2018-11-26 PROCEDURE — 83735 ASSAY OF MAGNESIUM: CPT

## 2018-11-26 PROCEDURE — 85025 COMPLETE CBC W/AUTO DIFF WBC: CPT

## 2018-11-26 PROCEDURE — 36415 COLL VENOUS BLD VENIPUNCTURE: CPT

## 2018-11-26 PROCEDURE — 99214 OFFICE O/P EST MOD 30 MIN: CPT | Performed by: FAMILY MEDICINE

## 2018-11-26 PROCEDURE — 80307 DRUG TEST PRSMV CHEM ANLYZR: CPT

## 2018-11-26 PROCEDURE — G0480 DRUG TEST DEF 1-7 CLASSES: HCPCS

## 2018-11-26 PROCEDURE — G8482 FLU IMMUNIZE ORDER/ADMIN: HCPCS | Performed by: FAMILY MEDICINE

## 2018-11-26 PROCEDURE — 82570 ASSAY OF URINE CREATININE: CPT

## 2018-11-26 PROCEDURE — 84100 ASSAY OF PHOSPHORUS: CPT

## 2018-11-26 PROCEDURE — 81001 URINALYSIS AUTO W/SCOPE: CPT

## 2018-11-26 PROCEDURE — 80053 COMPREHEN METABOLIC PANEL: CPT

## 2018-11-26 PROCEDURE — G8417 CALC BMI ABV UP PARAM F/U: HCPCS | Performed by: FAMILY MEDICINE

## 2018-11-26 PROCEDURE — 3017F COLORECTAL CA SCREEN DOC REV: CPT | Performed by: FAMILY MEDICINE

## 2018-11-26 PROCEDURE — 84550 ASSAY OF BLOOD/URIC ACID: CPT

## 2018-11-26 PROCEDURE — 84156 ASSAY OF PROTEIN URINE: CPT

## 2018-11-26 PROCEDURE — G8427 DOCREV CUR MEDS BY ELIG CLIN: HCPCS | Performed by: FAMILY MEDICINE

## 2018-11-26 PROCEDURE — 99212 OFFICE O/P EST SF 10 MIN: CPT | Performed by: FAMILY MEDICINE

## 2018-11-26 PROCEDURE — 36415 COLL VENOUS BLD VENIPUNCTURE: CPT | Performed by: FAMILY MEDICINE

## 2018-11-26 PROCEDURE — 81003 URINALYSIS AUTO W/O SCOPE: CPT | Performed by: FAMILY MEDICINE

## 2018-11-29 DIAGNOSIS — I10 ESSENTIAL HYPERTENSION: ICD-10-CM

## 2018-11-29 RX ORDER — LEVOTHYROXINE SODIUM 0.07 MG/1
75 TABLET ORAL DAILY
Qty: 30 TABLET | Refills: 5 | Status: SHIPPED | OUTPATIENT
Start: 2018-11-29 | End: 2019-05-31 | Stop reason: SDUPTHER

## 2018-11-29 RX ORDER — AMLODIPINE BESYLATE 5 MG/1
10 TABLET ORAL DAILY
Qty: 30 TABLET | Refills: 1 | Status: SHIPPED | OUTPATIENT
Start: 2018-11-29 | End: 2019-01-07 | Stop reason: SDUPTHER

## 2018-11-30 LAB
6AM URINE: <10 NG/ML
CODEINE, URINE: 199 NG/ML
HYDROCODONE, URINE: 23 NG/ML
HYDROMORPHONE, URINE: <20 NG/ML
MORPHINE URINE: <20 NG/ML
NORHYDROCODONE, URINE: 20 NG/ML
NOROXYCODONE, URINE: >4000 NG/ML
NOROXYMORPHONE, URINE: 435 NG/ML
OXYCODONE, URINE CONFIRMATION: >4000 NG/ML
OXYMORPHONE, URINE: 230 NG/ML

## 2018-12-10 DIAGNOSIS — T38.0X5A STEROID-INDUCED AVASCULAR NECROSIS OF RIGHT SHOULDER (HCC): ICD-10-CM

## 2018-12-10 DIAGNOSIS — E87.6 HYPOKALEMIA: ICD-10-CM

## 2018-12-10 DIAGNOSIS — M87.111 STEROID-INDUCED AVASCULAR NECROSIS OF RIGHT SHOULDER (HCC): ICD-10-CM

## 2018-12-10 RX ORDER — OXYCODONE HCL 10 MG/1
10 TABLET, FILM COATED, EXTENDED RELEASE ORAL 2 TIMES DAILY
Qty: 60 TABLET | Refills: 0 | Status: SHIPPED | OUTPATIENT
Start: 2018-12-10 | End: 2019-01-07

## 2018-12-10 RX ORDER — POTASSIUM CHLORIDE 1500 MG/1
20 TABLET, FILM COATED, EXTENDED RELEASE ORAL
Qty: 30 TABLET | Refills: 2 | Status: SHIPPED | OUTPATIENT
Start: 2018-12-10 | End: 2019-03-04 | Stop reason: SDUPTHER

## 2018-12-11 ENCOUNTER — TELEPHONE (OUTPATIENT)
Dept: FAMILY MEDICINE CLINIC | Age: 63
End: 2018-12-11

## 2018-12-11 DIAGNOSIS — M17.0 PRIMARY OSTEOARTHRITIS OF BOTH KNEES: ICD-10-CM

## 2018-12-11 RX ORDER — OXYCODONE AND ACETAMINOPHEN 10; 325 MG/1; MG/1
1 TABLET ORAL EVERY 12 HOURS PRN
Qty: 60 TABLET | Refills: 0 | Status: SHIPPED | OUTPATIENT
Start: 2018-12-11 | End: 2019-01-07 | Stop reason: SDUPTHER

## 2018-12-14 DIAGNOSIS — D86.9 SARCOIDOSIS: ICD-10-CM

## 2018-12-14 RX ORDER — PROMETHAZINE HYDROCHLORIDE AND CODEINE PHOSPHATE 6.25; 1 MG/5ML; MG/5ML
10 SYRUP ORAL 3 TIMES DAILY PRN
Qty: 480 ML | Refills: 2 | Status: SHIPPED | OUTPATIENT
Start: 2018-12-14 | End: 2019-03-05 | Stop reason: SDUPTHER

## 2019-01-02 ENCOUNTER — TELEPHONE (OUTPATIENT)
Dept: FAMILY MEDICINE CLINIC | Age: 64
End: 2019-01-02

## 2019-01-07 ENCOUNTER — OFFICE VISIT (OUTPATIENT)
Dept: FAMILY MEDICINE CLINIC | Age: 64
End: 2019-01-07
Payer: MEDICARE

## 2019-01-07 VITALS
BODY MASS INDEX: 29.06 KG/M2 | SYSTOLIC BLOOD PRESSURE: 106 MMHG | HEART RATE: 65 BPM | HEIGHT: 60 IN | TEMPERATURE: 98.4 F | OXYGEN SATURATION: 93 % | DIASTOLIC BLOOD PRESSURE: 73 MMHG | WEIGHT: 148 LBS

## 2019-01-07 DIAGNOSIS — I48.0 PAF (PAROXYSMAL ATRIAL FIBRILLATION) (HCC): ICD-10-CM

## 2019-01-07 DIAGNOSIS — S30.811A ABRASION OF ABDOMINAL WALL, INITIAL ENCOUNTER: ICD-10-CM

## 2019-01-07 DIAGNOSIS — M54.50 CHRONIC BILATERAL LOW BACK PAIN WITHOUT SCIATICA: Primary | ICD-10-CM

## 2019-01-07 DIAGNOSIS — M25.552 BILATERAL HIP PAIN: ICD-10-CM

## 2019-01-07 DIAGNOSIS — E23.2 DIABETES INSIPIDUS, NEUROHYPOPHYSEAL (HCC): ICD-10-CM

## 2019-01-07 DIAGNOSIS — I10 ESSENTIAL HYPERTENSION: ICD-10-CM

## 2019-01-07 DIAGNOSIS — J44.9 COPD, MODERATE (HCC): ICD-10-CM

## 2019-01-07 DIAGNOSIS — F33.41 RECURRENT MAJOR DEPRESSIVE DISORDER IN PARTIAL REMISSION (HCC): ICD-10-CM

## 2019-01-07 DIAGNOSIS — M17.0 PRIMARY OSTEOARTHRITIS OF BOTH KNEES: ICD-10-CM

## 2019-01-07 DIAGNOSIS — F33.41 RECURRENT MAJOR DEPRESSIVE DISORDER, IN PARTIAL REMISSION (HCC): ICD-10-CM

## 2019-01-07 DIAGNOSIS — M87.111 STEROID-INDUCED AVASCULAR NECROSIS OF RIGHT SHOULDER (HCC): ICD-10-CM

## 2019-01-07 DIAGNOSIS — I50.42 CHRONIC COMBINED SYSTOLIC AND DIASTOLIC HEART FAILURE (HCC): ICD-10-CM

## 2019-01-07 DIAGNOSIS — G89.29 CHRONIC BILATERAL LOW BACK PAIN WITHOUT SCIATICA: Primary | ICD-10-CM

## 2019-01-07 DIAGNOSIS — Z76.0 MEDICATION REFILL: ICD-10-CM

## 2019-01-07 DIAGNOSIS — N18.30 CHRONIC KIDNEY DISEASE, STAGE III (MODERATE) (HCC): ICD-10-CM

## 2019-01-07 DIAGNOSIS — T38.0X5A STEROID-INDUCED AVASCULAR NECROSIS OF RIGHT SHOULDER (HCC): ICD-10-CM

## 2019-01-07 DIAGNOSIS — J96.11 CHRONIC RESPIRATORY FAILURE WITH HYPOXIA (HCC): ICD-10-CM

## 2019-01-07 DIAGNOSIS — M25.551 BILATERAL HIP PAIN: ICD-10-CM

## 2019-01-07 PROCEDURE — G8926 SPIRO NO PERF OR DOC: HCPCS | Performed by: FAMILY MEDICINE

## 2019-01-07 PROCEDURE — G8482 FLU IMMUNIZE ORDER/ADMIN: HCPCS | Performed by: FAMILY MEDICINE

## 2019-01-07 PROCEDURE — 99212 OFFICE O/P EST SF 10 MIN: CPT | Performed by: FAMILY MEDICINE

## 2019-01-07 PROCEDURE — 1036F TOBACCO NON-USER: CPT | Performed by: FAMILY MEDICINE

## 2019-01-07 PROCEDURE — 99214 OFFICE O/P EST MOD 30 MIN: CPT | Performed by: FAMILY MEDICINE

## 2019-01-07 PROCEDURE — 3023F SPIROM DOC REV: CPT | Performed by: FAMILY MEDICINE

## 2019-01-07 PROCEDURE — G8427 DOCREV CUR MEDS BY ELIG CLIN: HCPCS | Performed by: FAMILY MEDICINE

## 2019-01-07 PROCEDURE — 3017F COLORECTAL CA SCREEN DOC REV: CPT | Performed by: FAMILY MEDICINE

## 2019-01-07 PROCEDURE — G8417 CALC BMI ABV UP PARAM F/U: HCPCS | Performed by: FAMILY MEDICINE

## 2019-01-07 RX ORDER — AMLODIPINE BESYLATE 5 MG/1
10 TABLET ORAL DAILY
Qty: 30 TABLET | Refills: 1 | Status: SHIPPED | OUTPATIENT
Start: 2019-01-07 | End: 2019-04-03 | Stop reason: SDUPTHER

## 2019-01-07 RX ORDER — FAMOTIDINE 20 MG/1
20 TABLET, FILM COATED ORAL 2 TIMES DAILY
Qty: 60 TABLET | Refills: 5 | Status: SHIPPED | OUTPATIENT
Start: 2019-01-07 | End: 2020-01-17 | Stop reason: SDUPTHER

## 2019-01-07 RX ORDER — CALCITONIN SALMON 200 [IU]/.09ML
1 SPRAY, METERED NASAL 2 TIMES DAILY
Qty: 1 BOTTLE | Refills: 5 | Status: CANCELLED | OUTPATIENT
Start: 2019-01-07

## 2019-01-07 RX ORDER — FERROUS SULFATE 325(65) MG
325 TABLET ORAL 2 TIMES DAILY
Qty: 60 TABLET | Refills: 5 | Status: SHIPPED | OUTPATIENT
Start: 2019-01-07 | End: 2019-11-07 | Stop reason: SDUPTHER

## 2019-01-07 RX ORDER — FLUTICASONE FUROATE AND VILANTEROL TRIFENATATE 100; 25 UG/1; UG/1
1 POWDER RESPIRATORY (INHALATION) DAILY
Qty: 28 EACH | Refills: 5 | Status: SHIPPED | OUTPATIENT
Start: 2019-01-07 | End: 2019-05-31 | Stop reason: SDUPTHER

## 2019-01-07 RX ORDER — PREDNISONE 1 MG/1
5 TABLET ORAL DAILY
Qty: 30 TABLET | Refills: 2 | Status: SHIPPED | OUTPATIENT
Start: 2019-01-07 | End: 2019-09-11 | Stop reason: SDUPTHER

## 2019-01-07 RX ORDER — OXYCODONE HCL 10 MG/1
10 TABLET, FILM COATED, EXTENDED RELEASE ORAL 2 TIMES DAILY
Qty: 60 TABLET | Refills: 0 | Status: CANCELLED | OUTPATIENT
Start: 2019-01-07 | End: 2019-02-07

## 2019-01-07 RX ORDER — OXYCODONE AND ACETAMINOPHEN 10; 325 MG/1; MG/1
1 TABLET ORAL EVERY 6 HOURS PRN
Qty: 120 TABLET | Refills: 0 | Status: SHIPPED | OUTPATIENT
Start: 2019-01-07 | End: 2019-02-05 | Stop reason: SDUPTHER

## 2019-01-22 DIAGNOSIS — D86.9 SARCOIDOSIS: ICD-10-CM

## 2019-01-22 RX ORDER — PROMETHAZINE HYDROCHLORIDE AND CODEINE PHOSPHATE 6.25; 1 MG/5ML; MG/5ML
10 SYRUP ORAL 3 TIMES DAILY PRN
Qty: 480 ML | Refills: 2 | Status: SHIPPED | OUTPATIENT
Start: 2019-01-22 | End: 2019-02-07

## 2019-01-22 RX ORDER — UNDERPADS 23" X 36"
EACH MISCELLANEOUS
Qty: 12 BOTTLE | Refills: 0 | Status: SHIPPED | OUTPATIENT
Start: 2019-01-22 | End: 2019-05-13

## 2019-01-22 RX ORDER — DIAPER,BRIEF,ADULT, DISPOSABLE
EACH MISCELLANEOUS
Qty: 100 EACH | Refills: 5 | Status: SHIPPED | OUTPATIENT
Start: 2019-01-22 | End: 2019-05-13

## 2019-02-05 DIAGNOSIS — M17.0 PRIMARY OSTEOARTHRITIS OF BOTH KNEES: ICD-10-CM

## 2019-02-05 RX ORDER — OXYCODONE AND ACETAMINOPHEN 10; 325 MG/1; MG/1
1 TABLET ORAL EVERY 6 HOURS PRN
Qty: 120 TABLET | Refills: 0 | Status: SHIPPED | OUTPATIENT
Start: 2019-02-05 | End: 2019-03-04 | Stop reason: SDUPTHER

## 2019-02-12 RX ORDER — OMEGA-3 FATTY ACIDS/FISH OIL 300-1000MG
1 CAPSULE ORAL DAILY
Qty: 90 CAPSULE | Refills: 1 | Status: SHIPPED | OUTPATIENT
Start: 2019-02-12 | End: 2019-05-24 | Stop reason: SDUPTHER

## 2019-03-04 DIAGNOSIS — E87.6 HYPOKALEMIA: ICD-10-CM

## 2019-03-04 DIAGNOSIS — M17.0 PRIMARY OSTEOARTHRITIS OF BOTH KNEES: ICD-10-CM

## 2019-03-04 RX ORDER — POTASSIUM CHLORIDE 1500 MG/1
20 TABLET, FILM COATED, EXTENDED RELEASE ORAL
Qty: 30 TABLET | Refills: 2 | Status: SHIPPED | OUTPATIENT
Start: 2019-03-04 | End: 2019-05-31 | Stop reason: SDUPTHER

## 2019-03-04 RX ORDER — OXYCODONE AND ACETAMINOPHEN 10; 325 MG/1; MG/1
1 TABLET ORAL EVERY 6 HOURS PRN
Qty: 120 TABLET | Refills: 0 | Status: SHIPPED | OUTPATIENT
Start: 2019-03-04 | End: 2019-04-03 | Stop reason: SDUPTHER

## 2019-03-05 DIAGNOSIS — D86.9 SARCOIDOSIS: ICD-10-CM

## 2019-03-05 RX ORDER — PROMETHAZINE HYDROCHLORIDE AND CODEINE PHOSPHATE 6.25; 1 MG/5ML; MG/5ML
10 SYRUP ORAL 3 TIMES DAILY PRN
Qty: 480 ML | Refills: 2 | Status: SHIPPED | OUTPATIENT
Start: 2019-03-05 | End: 2019-04-20 | Stop reason: SDUPTHER

## 2019-03-18 ENCOUNTER — OFFICE VISIT (OUTPATIENT)
Dept: FAMILY MEDICINE CLINIC | Age: 64
End: 2019-03-18
Payer: MEDICARE

## 2019-03-18 VITALS
WEIGHT: 174 LBS | HEIGHT: 60 IN | TEMPERATURE: 97.6 F | RESPIRATION RATE: 12 BRPM | BODY MASS INDEX: 34.16 KG/M2 | HEART RATE: 61 BPM | DIASTOLIC BLOOD PRESSURE: 86 MMHG | SYSTOLIC BLOOD PRESSURE: 148 MMHG | OXYGEN SATURATION: 100 %

## 2019-03-18 DIAGNOSIS — J96.11 CHRONIC RESPIRATORY FAILURE WITH HYPOXIA (HCC): Chronic | ICD-10-CM

## 2019-03-18 DIAGNOSIS — E78.2 MIXED HYPERLIPIDEMIA: ICD-10-CM

## 2019-03-18 DIAGNOSIS — I50.42 CHRONIC COMBINED SYSTOLIC AND DIASTOLIC HEART FAILURE (HCC): ICD-10-CM

## 2019-03-18 DIAGNOSIS — N18.30 CHRONIC KIDNEY DISEASE, STAGE III (MODERATE) (HCC): ICD-10-CM

## 2019-03-18 DIAGNOSIS — D86.9 SARCOIDOSIS: ICD-10-CM

## 2019-03-18 DIAGNOSIS — I48.0 PAF (PAROXYSMAL ATRIAL FIBRILLATION) (HCC): ICD-10-CM

## 2019-03-18 DIAGNOSIS — E03.9 PRIMARY HYPOTHYROIDISM: ICD-10-CM

## 2019-03-18 DIAGNOSIS — T38.0X5A STEROID-INDUCED AVASCULAR NECROSIS OF RIGHT SHOULDER (HCC): ICD-10-CM

## 2019-03-18 DIAGNOSIS — I10 BENIGN ESSENTIAL HYPERTENSION: ICD-10-CM

## 2019-03-18 DIAGNOSIS — J44.9 COPD, MODERATE (HCC): ICD-10-CM

## 2019-03-18 DIAGNOSIS — F33.41 RECURRENT MAJOR DEPRESSIVE DISORDER, IN PARTIAL REMISSION (HCC): Primary | ICD-10-CM

## 2019-03-18 DIAGNOSIS — E23.2 DIABETES INSIPIDUS, NEUROHYPOPHYSEAL (HCC): ICD-10-CM

## 2019-03-18 DIAGNOSIS — M87.111 STEROID-INDUCED AVASCULAR NECROSIS OF RIGHT SHOULDER (HCC): ICD-10-CM

## 2019-03-18 PROCEDURE — 99214 OFFICE O/P EST MOD 30 MIN: CPT | Performed by: FAMILY MEDICINE

## 2019-03-18 PROCEDURE — 99212 OFFICE O/P EST SF 10 MIN: CPT | Performed by: FAMILY MEDICINE

## 2019-03-18 RX ORDER — METOPROLOL SUCCINATE 100 MG/1
100 TABLET, EXTENDED RELEASE ORAL DAILY
Qty: 30 TABLET | Refills: 5 | Status: SHIPPED | OUTPATIENT
Start: 2019-03-18 | End: 2019-05-24 | Stop reason: SDUPTHER

## 2019-04-03 DIAGNOSIS — M17.0 PRIMARY OSTEOARTHRITIS OF BOTH KNEES: ICD-10-CM

## 2019-04-03 DIAGNOSIS — I10 ESSENTIAL HYPERTENSION: ICD-10-CM

## 2019-04-03 RX ORDER — AMLODIPINE BESYLATE 5 MG/1
10 TABLET ORAL DAILY
Qty: 30 TABLET | Refills: 1 | Status: SHIPPED | OUTPATIENT
Start: 2019-04-03 | End: 2019-05-31 | Stop reason: SDUPTHER

## 2019-04-03 RX ORDER — OXYCODONE AND ACETAMINOPHEN 10; 325 MG/1; MG/1
1 TABLET ORAL EVERY 6 HOURS PRN
Qty: 120 TABLET | Refills: 0 | Status: SHIPPED | OUTPATIENT
Start: 2019-04-03 | End: 2019-05-02 | Stop reason: SDUPTHER

## 2019-04-15 ENCOUNTER — TELEPHONE (OUTPATIENT)
Dept: FAMILY MEDICINE CLINIC | Age: 64
End: 2019-04-15

## 2019-04-17 ENCOUNTER — TELEPHONE (OUTPATIENT)
Dept: FAMILY MEDICINE CLINIC | Age: 64
End: 2019-04-17

## 2019-04-18 NOTE — TELEPHONE ENCOUNTER
I have asked her to decrease the cough medicine. Her prescription with refills should have lasted until the end of the month. I cannot refill this before April 30th.

## 2019-04-19 NOTE — TELEPHONE ENCOUNTER
Dr. Domenica Willis. I spoke with Pharmacy. Patient filled prescription 3/5/19, 3/21/19 and 4/3/19 these were all 16 day or longer between refills. Please advise.

## 2019-04-20 DIAGNOSIS — D86.9 SARCOIDOSIS: ICD-10-CM

## 2019-04-20 RX ORDER — PROMETHAZINE HYDROCHLORIDE AND CODEINE PHOSPHATE 6.25; 1 MG/5ML; MG/5ML
10 SYRUP ORAL 3 TIMES DAILY PRN
Qty: 480 ML | Refills: 2 | Status: SHIPPED | OUTPATIENT
Start: 2019-04-20 | End: 2019-05-06

## 2019-04-23 ENCOUNTER — TELEPHONE (OUTPATIENT)
Dept: FAMILY MEDICINE CLINIC | Age: 64
End: 2019-04-23

## 2019-04-23 DIAGNOSIS — J44.9 COPD, MODERATE (HCC): ICD-10-CM

## 2019-04-23 DIAGNOSIS — D86.9 SARCOIDOSIS: ICD-10-CM

## 2019-04-23 DIAGNOSIS — J96.11 CHRONIC RESPIRATORY FAILURE WITH HYPOXIA (HCC): Chronic | ICD-10-CM

## 2019-04-23 DIAGNOSIS — M54.50 CHRONIC BILATERAL LOW BACK PAIN WITHOUT SCIATICA: Primary | ICD-10-CM

## 2019-04-23 DIAGNOSIS — R53.81 DEBILITY: ICD-10-CM

## 2019-04-23 DIAGNOSIS — G89.29 CHRONIC BILATERAL LOW BACK PAIN WITHOUT SCIATICA: Primary | ICD-10-CM

## 2019-04-23 NOTE — TELEPHONE ENCOUNTER
Patient call, She saw Dr. Mer Joseph yesterday and he suggested thet she get physical therapy. She would like [facility] PT to come to her home for therapy again. Please advise.

## 2019-05-02 DIAGNOSIS — M17.0 PRIMARY OSTEOARTHRITIS OF BOTH KNEES: ICD-10-CM

## 2019-05-02 RX ORDER — OXYCODONE AND ACETAMINOPHEN 10; 325 MG/1; MG/1
1 TABLET ORAL EVERY 6 HOURS PRN
Qty: 120 TABLET | Refills: 0 | Status: SHIPPED | OUTPATIENT
Start: 2019-05-02 | End: 2019-05-30 | Stop reason: SDUPTHER

## 2019-05-24 RX ORDER — OMEGA-3 FATTY ACIDS/FISH OIL 300-1000MG
1 CAPSULE ORAL DAILY
Qty: 90 CAPSULE | Refills: 1 | Status: SHIPPED
Start: 2019-05-24 | End: 2020-03-02 | Stop reason: SDUPTHER

## 2019-05-24 RX ORDER — METOPROLOL SUCCINATE 100 MG/1
100 TABLET, EXTENDED RELEASE ORAL DAILY
Qty: 30 TABLET | Refills: 5 | Status: SHIPPED | OUTPATIENT
Start: 2019-05-24 | End: 2019-09-11 | Stop reason: SDUPTHER

## 2019-05-24 NOTE — TELEPHONE ENCOUNTER
Requested Prescriptions     Pending Prescriptions Disp Refills    Omega 3 1000 MG CAPS 90 capsule 1     Sig: Take 1 each by mouth daily    metoprolol succinate (TOPROL XL) 100 MG extended release tablet 30 tablet 5     Sig: Take 1 tablet by mouth daily       Next appt is Visit date not found  Last appt was 3/18/2019

## 2019-05-30 DIAGNOSIS — J44.9 COPD, MODERATE (HCC): Primary | ICD-10-CM

## 2019-05-30 DIAGNOSIS — M17.0 PRIMARY OSTEOARTHRITIS OF BOTH KNEES: ICD-10-CM

## 2019-05-30 RX ORDER — PROMETHAZINE HYDROCHLORIDE AND CODEINE PHOSPHATE 6.25; 1 MG/5ML; MG/5ML
5 SYRUP ORAL 4 TIMES DAILY PRN
Qty: 473 ML | Refills: 0 | Status: SHIPPED | OUTPATIENT
Start: 2019-05-30 | End: 2019-06-20 | Stop reason: SDUPTHER

## 2019-05-30 RX ORDER — OXYCODONE AND ACETAMINOPHEN 10; 325 MG/1; MG/1
1 TABLET ORAL EVERY 6 HOURS PRN
Qty: 120 TABLET | Refills: 0 | Status: SHIPPED | OUTPATIENT
Start: 2019-05-30 | End: 2019-06-28 | Stop reason: SDUPTHER

## 2019-05-30 NOTE — TELEPHONE ENCOUNTER
I am decreasing amount of cough medicine as we previously discussed. These prescriptions must last one month. She also must make an appointment prior to any further refills.

## 2019-05-31 DIAGNOSIS — E87.6 HYPOKALEMIA: ICD-10-CM

## 2019-05-31 DIAGNOSIS — I10 ESSENTIAL HYPERTENSION: ICD-10-CM

## 2019-05-31 RX ORDER — AMLODIPINE BESYLATE 5 MG/1
10 TABLET ORAL DAILY
Qty: 30 TABLET | Refills: 1 | Status: SHIPPED | OUTPATIENT
Start: 2019-05-31 | End: 2019-08-19 | Stop reason: SDUPTHER

## 2019-05-31 RX ORDER — LEVOTHYROXINE SODIUM 0.07 MG/1
75 TABLET ORAL DAILY
Qty: 30 TABLET | Refills: 5 | Status: SHIPPED | OUTPATIENT
Start: 2019-05-31 | End: 2019-11-21 | Stop reason: SDUPTHER

## 2019-05-31 RX ORDER — POTASSIUM CHLORIDE 1500 MG/1
20 TABLET, FILM COATED, EXTENDED RELEASE ORAL
Qty: 30 TABLET | Refills: 2 | Status: SHIPPED | OUTPATIENT
Start: 2019-05-31 | End: 2019-08-26 | Stop reason: SDUPTHER

## 2019-05-31 RX ORDER — FLUTICASONE FUROATE AND VILANTEROL TRIFENATATE 100; 25 UG/1; UG/1
1 POWDER RESPIRATORY (INHALATION) DAILY
Qty: 28 EACH | Refills: 5 | Status: SHIPPED | OUTPATIENT
Start: 2019-05-31 | End: 2020-01-17 | Stop reason: SDUPTHER

## 2019-06-03 ENCOUNTER — TELEPHONE (OUTPATIENT)
Dept: FAMILY MEDICINE CLINIC | Age: 64
End: 2019-06-03

## 2019-06-03 NOTE — TELEPHONE ENCOUNTER
Pt called in to see if Dr. Lopez Reynolds can see her on 6-18-19 in the afternoon which is the only day her  can bring her since he works and only has certain days off. Pt is scheduled to come in on 6-6-19 to see Dr. Branden Martin but she really wants to see her pcp.   Please advise and thank you

## 2019-06-03 NOTE — TELEPHONE ENCOUNTER
She should keep her appointment with Dr. Ahs Kennedy. I am unable to see patients on that day as I am scheduled for something else.

## 2019-06-04 ENCOUNTER — TELEPHONE (OUTPATIENT)
Dept: FAMILY MEDICINE CLINIC | Age: 64
End: 2019-06-04

## 2019-06-04 NOTE — TELEPHONE ENCOUNTER
Spoke to pt today and she will keep her appointment with Dr. Efrain Wing but she really want to speak with her pcp about one of her medications and other things since she cannot see her pcp due to availability.

## 2019-06-05 RX ORDER — METOPROLOL SUCCINATE 50 MG/1
TABLET, EXTENDED RELEASE ORAL
Qty: 30 TABLET | Refills: 3 | Status: SHIPPED | OUTPATIENT
Start: 2019-06-05 | End: 2019-10-29 | Stop reason: SDUPTHER

## 2019-06-05 NOTE — TELEPHONE ENCOUNTER
Dr. Usama Angulo, patient has been breaking her metoprolol  100 mg XL in half for a total of 150 mg daily please advise .

## 2019-06-05 NOTE — TELEPHONE ENCOUNTER
Spoke with patient. She apologized for missing her appointment last month, and had several concerns. She takes metoprolol 150 daily, usually by cutting toprol  in half and then taking 1 and 1/2 daily. Since we have been using XL, I will have her take one 100 mg tablet with one 50 mg tablet daily. She also states that we were going to decrease her cough medicine more gradually and that I wrote her prescription to last one month, but that we had agreed to 20 days. I will agree to do this for the next several months and then taper again. Finally, she states that she has been having severe pain and weakness in her upper arms, and it is making it difficult for her to raise her arms. She has appointment tomorrow with another physician in this practice and will be evaluated then for this. Should probably consider PMR at that time.

## 2019-06-06 ENCOUNTER — OFFICE VISIT (OUTPATIENT)
Dept: FAMILY MEDICINE CLINIC | Age: 64
End: 2019-06-06
Payer: COMMERCIAL

## 2019-06-06 ENCOUNTER — HOSPITAL ENCOUNTER (OUTPATIENT)
Age: 64
Discharge: HOME | End: 2019-06-08
Payer: COMMERCIAL

## 2019-06-06 ENCOUNTER — TELEPHONE (OUTPATIENT)
Dept: FAMILY MEDICINE CLINIC | Age: 64
End: 2019-06-06

## 2019-06-06 VITALS
HEIGHT: 60 IN | DIASTOLIC BLOOD PRESSURE: 76 MMHG | BODY MASS INDEX: 34.55 KG/M2 | OXYGEN SATURATION: 94 % | HEART RATE: 62 BPM | WEIGHT: 176 LBS | SYSTOLIC BLOOD PRESSURE: 111 MMHG | TEMPERATURE: 98.1 F

## 2019-06-06 DIAGNOSIS — J44.9 CHRONIC OBSTRUCTIVE PULMONARY DISEASE, UNSPECIFIED COPD TYPE (HCC): ICD-10-CM

## 2019-06-06 DIAGNOSIS — G89.29 CHRONIC BILATERAL LOW BACK PAIN WITHOUT SCIATICA: ICD-10-CM

## 2019-06-06 DIAGNOSIS — M35.3 POLYMYALGIA RHEUMATICA SYNDROME (HCC): Primary | ICD-10-CM

## 2019-06-06 DIAGNOSIS — M17.0 PRIMARY OSTEOARTHRITIS OF BOTH KNEES: ICD-10-CM

## 2019-06-06 DIAGNOSIS — I50.42 CHRONIC COMBINED SYSTOLIC AND DIASTOLIC HEART FAILURE (HCC): ICD-10-CM

## 2019-06-06 DIAGNOSIS — M35.3 POLYMYALGIA RHEUMATICA SYNDROME (HCC): ICD-10-CM

## 2019-06-06 DIAGNOSIS — M54.50 CHRONIC BILATERAL LOW BACK PAIN WITHOUT SCIATICA: ICD-10-CM

## 2019-06-06 LAB
C-REACTIVE PROTEIN: 0.6 MG/DL (ref 0–0.4)
SEDIMENTATION RATE, ERYTHROCYTE: 13 MM/HR (ref 0–20)

## 2019-06-06 PROCEDURE — G8417 CALC BMI ABV UP PARAM F/U: HCPCS | Performed by: STUDENT IN AN ORGANIZED HEALTH CARE EDUCATION/TRAINING PROGRAM

## 2019-06-06 PROCEDURE — 1036F TOBACCO NON-USER: CPT | Performed by: STUDENT IN AN ORGANIZED HEALTH CARE EDUCATION/TRAINING PROGRAM

## 2019-06-06 PROCEDURE — 3023F SPIROM DOC REV: CPT | Performed by: STUDENT IN AN ORGANIZED HEALTH CARE EDUCATION/TRAINING PROGRAM

## 2019-06-06 PROCEDURE — 86140 C-REACTIVE PROTEIN: CPT

## 2019-06-06 PROCEDURE — 85651 RBC SED RATE NONAUTOMATED: CPT

## 2019-06-06 PROCEDURE — 99212 OFFICE O/P EST SF 10 MIN: CPT | Performed by: STUDENT IN AN ORGANIZED HEALTH CARE EDUCATION/TRAINING PROGRAM

## 2019-06-06 PROCEDURE — 3017F COLORECTAL CA SCREEN DOC REV: CPT | Performed by: STUDENT IN AN ORGANIZED HEALTH CARE EDUCATION/TRAINING PROGRAM

## 2019-06-06 PROCEDURE — G8926 SPIRO NO PERF OR DOC: HCPCS | Performed by: STUDENT IN AN ORGANIZED HEALTH CARE EDUCATION/TRAINING PROGRAM

## 2019-06-06 PROCEDURE — 99213 OFFICE O/P EST LOW 20 MIN: CPT | Performed by: STUDENT IN AN ORGANIZED HEALTH CARE EDUCATION/TRAINING PROGRAM

## 2019-06-06 PROCEDURE — 36415 COLL VENOUS BLD VENIPUNCTURE: CPT | Performed by: FAMILY MEDICINE

## 2019-06-06 PROCEDURE — G8427 DOCREV CUR MEDS BY ELIG CLIN: HCPCS | Performed by: STUDENT IN AN ORGANIZED HEALTH CARE EDUCATION/TRAINING PROGRAM

## 2019-06-06 NOTE — TELEPHONE ENCOUNTER
LSW met with patient per consult from examining physician Dr. Rj Baig. Patient requested shower chair transfer bench, LSW suggested hand held shower as well, patient does have [address], thinks name is Darshan Pruitt, has  name at home. Patient has emergency response button, reportedly was to get home health aide [facility]) for assistance with ADL's, but no one came out, confused as to process and what transpired to date. Patient needs Home Care for PT, has no preference, ok with [facility]. Spouse in waiting room, vero was told to get order from doctor for [address]. LSW advised patient and spouse to contact LSW tomorrow with contact info for [facility] for LSW to call and discuss waiver services for patient. LSW advised patient and spouse to contact CM anytime in future as that was role of CM. Spouse and patient verbalized understanding.

## 2019-06-06 NOTE — PROGRESS NOTES
F 63    For eval re possible Polymyalgia Rheumatica     Has always had shoulder pains due to injuries and OA   No neck problems   Full ROM of neck    No pain or stiffness of hips    Also pain  Of bilateral deltoid area      Blood pressure 111/76, pulse 62, temperature 98.1 °F (36.7 °C), temperature source Oral, height 5' (1.524 m), weight 176 lb (79.8 kg), SpO2 94 %, not currently breastfeeding. HEENT WNL     Heart regular    Lungs clear    abd non-tender      No edema    Pulses intact    tender deltoid insertion bilaterally    llimited ROM    20 degrees abduction causes pain     Below 20 degrees she can abduct both arms    Sensation intact bilaterally   Neck supple   No rigidity   No c-spine rigidity    Both hips have full ROM    ESR  CRP    Attending Physician Statement  I have discussed the case, including pertinent history and exam findings with the resident. I agree with the documented assessment and plan.

## 2019-06-06 NOTE — PROGRESS NOTES
blood transfusion approx 05/2017    History of Clostridium difficile     negative culture 12-15-15; resolved    Hypothyroidism     Ischemic colitis, enteritis, or enterocolitis ([address]     Long-term current use of steroids     Lumbar disc herniation     Myalgia and myositis, unspecified     Nephrosclerosis     Nonunion of fracture 2/22/2011    Osteoarthritis     severe    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HCC)     PCWP mean:26.0 PA mean: 24.00; denies any recent issues    Pulmonary hypertension (HCC)     ventricular diastolic function consistent with abnormal relaxation (stage I)    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, f/u w/ PCP    Rhabdomyolysis 5/9/2011    Steroid-induced avascular necrosis of shoulder ([address]     Right    Tibia fracture 7/10       Past Surgical History:        Procedure Laterality Date    ABDOMEN SURGERY  2008    ischemic colitis    COLONOSCOPY  2008    healed colitis    COLONOSCOPY  04/11/2014    COLONOSCOPY  11/23/2015    severe colitis    COLONOSCOPY  10/24/2016    COLONOSCOPY  07/26/2017    ECHO COMPL W DOP COLOR FLOW  8/16/2015         ECHO COMPLETE  4/22/2013         FRACTURE SURGERY  2010    Tibial right    HEMORRHOID SURGERY  12/08/2016    KYPHOSIS SURGERY      For comp. fx T10    LUMBAR DISC SURGERY      OTHER SURGICAL HISTORY  11/1/11    removal r leg external fixator    TIBIA / FIBIA LENGTHENING      application TSF  3/9/56    UPPER GASTROINTESTINAL ENDOSCOPY  1/4/2016    UPPER GASTROINTESTINAL ENDOSCOPY  07/26/2017       Allergies:    Patient has no known allergies.     Social History:   Social History     Socioeconomic History    Marital status:      Spouse name: Not on file    Number of children: Not on file    Years of education: Not on file    Highest education level: Not on file   Occupational History    Occupation: disability -[employer]   Social Needs    Financial resource strain: Not on file   [id number] Measurement insecurity:     Worry: Not on file     Inability: Not on file    Transportation needs:     Medical: Not on file     Non-medical: Not on file   Tobacco Use    Smoking status: Former Smoker     Packs/day: 0.75     Years: 25.00     Pack years: 18.75     Types: Cigarettes     Last attempt to quit: 9/10/1996     Years since quittin.7    Smokeless tobacco: Never Used   Substance and Sexual Activity    Alcohol use: No     Alcohol/week: 0.0 oz     Comment: drinks mountain dew and clear pops. \"i drink alot of pop\" maybe 2 liters in a day.  Drug use: No     Comment:  coccaine    Sexual activity: Not Currently   Lifestyle    Physical activity:     Days per week: Not on file     Minutes per session: Not on file    Stress: Not on file   Relationships    Social connections:     Talks on phone: Not on file     Gets together: Not on file     Attends Baptism service: Not on file     Active member of club or organization: Not on file     Attends meetings of clubs or organizations: Not on file     Relationship status: Not on file    Intimate partner violence:     Fear of current or ex partner: Not on file     Emotionally abused: Not on file     Physically abused: Not on file     Forced sexual activity: Not on file   Other Topics Concern    Not on file   Social History Narrative    1 child, 2 step children,  for 20 years. Family History:       Problem Relation Age of Onset    Diabetes Mother     Arthritis Mother     High Blood Pressure Mother     Arthritis Father     High Blood Pressure Father     Diabetes Father     High Blood Pressure Sister     Alcohol Abuse Sister     Substance Abuse Brother     Substance Abuse Sister     Coronary Art Dis Neg Hx        Reviewof Systems:   Review of Systems   Constitutional: Negative for fever. Respiratory: Negative for shortness of breath and wheezing. Cardiovascular: Negative for chest pain and palpitations.    Gastrointestinal: Negative for [facility]    4. Chronic combined systolic and diastolic heart failure ([facility]  - [address]    5. Chronic obstructive pulmonary disease, unspecified COPD type ([facility]  - [facility]      Issues to address at future appointment/s:    Return to [facility] in about 2 months (around 8/6/2019) for shoulder pain . Medication List:    Current Outpatient Medications   Medication Sig Dispense Refill    metoprolol succinate (TOPROL XL) 50 MG extended release tablet TAKE ONE TABLET EVERY DAY WITH THE 100MG 30 tablet 3    BREO ELLIPTA 100-25 MCG/INH AEPB inhaler Inhale 1 puff into the lungs daily 28 each 5    amLODIPine (NORVASC) 5 MG tablet Take 2 tablets by mouth daily 30 tablet 1    potassium chloride (KLOR-CON M) 20 MEQ TBCR extended release tablet Take 1 tablet by mouth daily (with breakfast) 30 tablet 2    levothyroxine (SYNTHROID) 75 MCG tablet Take 1 tablet by mouth daily 30 tablet 5    oxyCODONE-acetaminophen (PERCOCET)  MG per tablet Take 1 tablet by mouth every 6 hours as needed for Pain for up to 30 days. Intended supply: 30 days 120 tablet 0    promethazine-codeine (PHENERGAN WITH CODEINE) 6.25-10 MG/5ML syrup Take 5 mLs by mouth 4 times daily as needed for Cough for up to 30 days.  473 mL 0    Omega 3 1000 MG CAPS Take 1 each by mouth daily 90 capsule 1    metoprolol succinate (TOPROL XL) 100 MG extended release tablet Take 1 tablet by mouth daily 30 tablet 5    apixaban (ELIQUIS) 5 MG TABS tablet Take 1 tablet by mouth 2 times daily 60 tablet 5    ferrous sulfate 325 (65 Fe) MG tablet Take 1 tablet by mouth 2 times daily 60 tablet 5    predniSONE (DELTASONE) 5 MG tablet Take 1 tablet by mouth daily 30 tablet 2    famotidine (PEPCID) 20 MG tablet Take 1 tablet by mouth 2 times daily 60 tablet 5    Zinc Oxide 10 % OINT Apply BID to affected area (Patient taking differently: as needed Apply BID to affected area) 85 g 5    Menthol, Topical Analgesic, (BIOFREEZE) 4 % GEL Apply BID to affected areas 150 mL 5    albuterol sulfate HFA (PROVENTIL HFA) 108 (90 Base) MCG/ACT inhaler Inhale 2 puffs into the lungs every 6 hours as needed for Wheezing or Shortness of Breath 1 Inhaler 3    desmopressin (DDAVP NASAL) 0.01 % solution 2 sprays by Nasal route 2 times daily 1 Bottle 2    OXYGEN 4 L/min by Nasal route as needed. BMS       No current facility-administered medications for this visit.          Barba Heimlich, MD     Electronically signed by Barba Heimlich, MD on 6/10/2019 at 2:47 PM

## 2019-06-10 ASSESSMENT — ENCOUNTER SYMPTOMS
WHEEZING: 0
ABDOMINAL PAIN: 0
SHORTNESS OF BREATH: 0

## 2019-06-13 ENCOUNTER — TELEPHONE (OUTPATIENT)
Dept: FAMILY MEDICINE CLINIC | Age: 64
End: 2019-06-13

## 2019-06-13 NOTE — TELEPHONE ENCOUNTER
Patient called requesting lab results. She also states that when she saw Dr. Marina Conte she was told that her phenergan with codeine was supposed to go back to 20 day supply . She would like her prescrition changed back, please advise.

## 2019-06-14 NOTE — TELEPHONE ENCOUNTER
Lab results were ok.  (mild elevation of CRP, but likely not significant)  I can redo her prescription, but she should have enough to get her through till June 19th. I can refill it then. (She filled it on May 30th).

## 2019-06-20 ENCOUNTER — TELEPHONE (OUTPATIENT)
Dept: FAMILY MEDICINE CLINIC | Age: 64
End: 2019-06-20

## 2019-06-20 DIAGNOSIS — M25.552 BILATERAL HIP PAIN: ICD-10-CM

## 2019-06-20 DIAGNOSIS — G89.29 CHRONIC BILATERAL LOW BACK PAIN WITHOUT SCIATICA: Primary | ICD-10-CM

## 2019-06-20 DIAGNOSIS — J44.9 COPD, MODERATE (HCC): ICD-10-CM

## 2019-06-20 DIAGNOSIS — M25.551 BILATERAL HIP PAIN: ICD-10-CM

## 2019-06-20 DIAGNOSIS — R53.81 DEBILITY: ICD-10-CM

## 2019-06-20 DIAGNOSIS — M54.50 CHRONIC BILATERAL LOW BACK PAIN WITHOUT SCIATICA: Primary | ICD-10-CM

## 2019-06-20 RX ORDER — PROMETHAZINE HYDROCHLORIDE AND CODEINE PHOSPHATE 6.25; 1 MG/5ML; MG/5ML
5 SYRUP ORAL 4 TIMES DAILY PRN
Qty: 473 ML | Refills: 1 | Status: SHIPPED | OUTPATIENT
Start: 2019-06-20 | End: 2019-07-29 | Stop reason: SDUPTHER

## 2019-06-20 NOTE — TELEPHONE ENCOUNTER
Aure Mcdonald called in and is asking if you could please order her a shower chair. She needs the order to go to [facility].   Thank you

## 2019-06-21 ENCOUNTER — TELEPHONE (OUTPATIENT)
Dept: FAMILY MEDICINE CLINIC | Age: 64
End: 2019-06-21

## 2019-06-21 DIAGNOSIS — G89.29 CHRONIC BILATERAL LOW BACK PAIN WITHOUT SCIATICA: Primary | ICD-10-CM

## 2019-06-21 DIAGNOSIS — M25.552 BILATERAL HIP PAIN: ICD-10-CM

## 2019-06-21 DIAGNOSIS — R53.81 DEBILITY: ICD-10-CM

## 2019-06-21 DIAGNOSIS — M25.551 BILATERAL HIP PAIN: ICD-10-CM

## 2019-06-21 DIAGNOSIS — M54.50 CHRONIC BILATERAL LOW BACK PAIN WITHOUT SCIATICA: Primary | ICD-10-CM

## 2019-06-21 NOTE — TELEPHONE ENCOUNTER
[facility] is requesting that you modify the recent shower chair order to say shower chair with tub bench extender. Please advise.

## 2019-06-28 DIAGNOSIS — M17.0 PRIMARY OSTEOARTHRITIS OF BOTH KNEES: ICD-10-CM

## 2019-06-28 RX ORDER — OXYCODONE AND ACETAMINOPHEN 10; 325 MG/1; MG/1
1 TABLET ORAL EVERY 6 HOURS PRN
Qty: 120 TABLET | Refills: 0 | Status: SHIPPED | OUTPATIENT
Start: 2019-06-28 | End: 2019-07-29 | Stop reason: SDUPTHER

## 2019-07-09 ENCOUNTER — TELEPHONE (OUTPATIENT)
Dept: FAMILY MEDICINE CLINIC | Age: 64
End: 2019-07-09

## 2019-07-11 NOTE — TELEPHONE ENCOUNTER
Per home care new order needed. Attempted to contact after last order placed but patient did not respond.

## 2019-07-26 ENCOUNTER — TELEPHONE (OUTPATIENT)
Dept: FAMILY MEDICINE CLINIC | Age: 64
End: 2019-07-26

## 2019-07-26 DIAGNOSIS — J44.9 COPD, MODERATE (HCC): ICD-10-CM

## 2019-07-26 DIAGNOSIS — M17.0 PRIMARY OSTEOARTHRITIS OF BOTH KNEES: ICD-10-CM

## 2019-07-29 ENCOUNTER — TELEPHONE (OUTPATIENT)
Dept: FAMILY MEDICINE CLINIC | Age: 64
End: 2019-07-29

## 2019-07-29 RX ORDER — OXYCODONE AND ACETAMINOPHEN 10; 325 MG/1; MG/1
1 TABLET ORAL EVERY 6 HOURS PRN
Qty: 120 TABLET | Refills: 0 | Status: SHIPPED | OUTPATIENT
Start: 2019-07-29 | End: 2019-08-29 | Stop reason: SDUPTHER

## 2019-07-29 RX ORDER — PROMETHAZINE HYDROCHLORIDE AND CODEINE PHOSPHATE 6.25; 1 MG/5ML; MG/5ML
5 SYRUP ORAL 4 TIMES DAILY PRN
Qty: 473 ML | Refills: 1 | Status: SHIPPED | OUTPATIENT
Start: 2019-07-29 | End: 2019-08-19 | Stop reason: SDUPTHER

## 2019-07-30 ENCOUNTER — HOSPITAL ENCOUNTER (EMERGENCY)
Age: 64
Discharge: HOME | End: 2019-07-30
Payer: COMMERCIAL

## 2019-07-30 VITALS
HEART RATE: 70 BPM | WEIGHT: 170 LBS | RESPIRATION RATE: 16 BRPM | SYSTOLIC BLOOD PRESSURE: 140 MMHG | TEMPERATURE: 97.4 F | BODY MASS INDEX: 33.38 KG/M2 | DIASTOLIC BLOOD PRESSURE: 94 MMHG | HEIGHT: 60 IN | OXYGEN SATURATION: 100 %

## 2019-07-30 DIAGNOSIS — H61.892 FOREIGN BODY SENSATION IN LEFT EAR CANAL: Primary | ICD-10-CM

## 2019-07-30 PROCEDURE — 99282 EMERGENCY DEPT VISIT SF MDM: CPT

## 2019-07-30 NOTE — ED PROVIDER NOTES
necrosis of shoulder ([address]     Right    Tibia fracture 7/10   ,presenting to the emergency department with complaint of plugged sensation in the left ear. Symptoms began several hours ago and are unchanged since that time. Patient denies chills, dyspnea, fever, nasal congestion, nonproductive cough, productive cough, sneezing, sore throat and wheezing. Her symptoms are relieved by nothing. She has recent history of otitis externa which she completed antibiotic drops for. States ran out of cotton balls so she decided to use a Q tip. She believes it is still stuck in her ear. ROS    Pertinent positives and negatives are stated within HPI, all other systems reviewed and are negative. Past Surgical History:  has a past surgical history that includes fracture surgery (2010); Kyphosis surgery; Lumbar disc surgery; Tibia / fibia lengthening; other surgical history (11/1/11); Abdomen surgery (2008); Echo Complete (4/22/2013); ECHO Compl W Dop Color Flow (8/16/2015); Upper gastrointestinal endoscopy (1/4/2016); Hemorrhoid surgery (12/08/2016); Colonoscopy (2008); Colonoscopy (04/11/2014); Colonoscopy (11/23/2015); Colonoscopy (10/24/2016); Colonoscopy (07/26/2017); and Upper gastrointestinal endoscopy (07/26/2017). Social History:  reports that she quit smoking about 22 years ago. Her smoking use included cigarettes. She has a 18.75 pack-year smoking history. She has never used smokeless tobacco. She reports that she does not drink alcohol or use drugs. Family History: family history includes Alcohol Abuse in her sister; Arthritis in her father and mother; Diabetes in her father and mother; High Blood Pressure in her father, mother, and sister; Substance Abuse in her brother and sister. Allergies: Patient has no known allergies.     Physical Exam           ED Triage Vitals [07/30/19 0818]   BP Temp Temp Source Pulse Resp SpO2 Height Weight   (!) 140/94 97.4 °F (36.3 °C) Temporal 70 16 100 % 5' (1.524 m) 170 lb

## 2019-07-30 NOTE — ED NOTES
Bed: 17A-17  Expected date:   Expected time:   Means of arrival:   Comments:  EMS     Carissa Amaya RN  07/30/19 [id number]

## 2019-08-12 ENCOUNTER — APPOINTMENT (OUTPATIENT)
Dept: GENERAL RADIOLOGY | Age: 64
End: 2019-08-12
Payer: COMMERCIAL

## 2019-08-12 ENCOUNTER — HOSPITAL ENCOUNTER (EMERGENCY)
Age: 64
Discharge: HOME | End: 2019-08-12
Attending: EMERGENCY MEDICINE
Payer: COMMERCIAL

## 2019-08-12 VITALS
BODY MASS INDEX: 33.38 KG/M2 | RESPIRATION RATE: 19 BRPM | WEIGHT: 170 LBS | SYSTOLIC BLOOD PRESSURE: 190 MMHG | DIASTOLIC BLOOD PRESSURE: 101 MMHG | TEMPERATURE: 97.2 F | HEART RATE: 65 BPM | HEIGHT: 60 IN | OXYGEN SATURATION: 94 %

## 2019-08-12 DIAGNOSIS — R07.9 CHEST PAIN, UNSPECIFIED TYPE: Primary | ICD-10-CM

## 2019-08-12 LAB
ALBUMIN SERPL-MCNC: 4.1 G/DL (ref 3.5–5.2)
ALP BLD-CCNC: 79 U/L (ref 35–104)
ALT SERPL-CCNC: 11 U/L (ref 0–32)
ANION GAP SERPL CALCULATED.3IONS-SCNC: 8 MMOL/L (ref 7–16)
AST SERPL-CCNC: 21 U/L (ref 0–31)
BASOPHILS ABSOLUTE: 0.01 E9/L (ref 0–0.2)
BASOPHILS RELATIVE PERCENT: 0.2 % (ref 0–2)
BILIRUB SERPL-MCNC: 0.3 MG/DL (ref 0–1.2)
BUN BLDV-MCNC: 18 MG/DL (ref 8–23)
CALCIUM SERPL-MCNC: 9.2 MG/DL (ref 8.6–10.2)
CHLORIDE BLD-SCNC: 93 MMOL/L (ref 98–107)
CO2: 32 MMOL/L (ref 22–29)
CREAT SERPL-MCNC: 0.9 MG/DL (ref 0.5–1)
D DIMER: <200 NG/ML DDU
D DIMER: <200 NG/ML DDU
EKG ATRIAL RATE: 62 BPM
EKG P AXIS: 31 DEGREES
EKG P-R INTERVAL: 134 MS
EKG Q-T INTERVAL: 416 MS
EKG QRS DURATION: 80 MS
EKG QTC CALCULATION (BAZETT): 422 MS
EKG R AXIS: 69 DEGREES
EKG T AXIS: 60 DEGREES
EKG VENTRICULAR RATE: 62 BPM
EOSINOPHILS ABSOLUTE: 0.17 E9/L (ref 0.05–0.5)
EOSINOPHILS RELATIVE PERCENT: 3.6 % (ref 0–6)
GFR AFRICAN AMERICAN: >60
GFR NON-AFRICAN AMERICAN: >60 ML/MIN/1.73
GLUCOSE BLD-MCNC: 125 MG/DL (ref 74–99)
HCT VFR BLD CALC: 36.6 % (ref 34–48)
HEMOGLOBIN: 11.1 G/DL (ref 11.5–15.5)
IMMATURE GRANULOCYTES #: 0.02 E9/L
IMMATURE GRANULOCYTES %: 0.4 % (ref 0–5)
LACTIC ACID: 2 MMOL/L (ref 0.5–2.2)
LYMPHOCYTES ABSOLUTE: 0.44 E9/L (ref 1.5–4)
LYMPHOCYTES RELATIVE PERCENT: 9.4 % (ref 20–42)
MCH RBC QN AUTO: 25.8 PG (ref 26–35)
MCHC RBC AUTO-ENTMCNC: 30.3 % (ref 32–34.5)
MCV RBC AUTO: 85.1 FL (ref 80–99.9)
MONOCYTES ABSOLUTE: 0.32 E9/L (ref 0.1–0.95)
MONOCYTES RELATIVE PERCENT: 6.9 % (ref 2–12)
NEUTROPHILS ABSOLUTE: 3.7 E9/L (ref 1.8–7.3)
NEUTROPHILS RELATIVE PERCENT: 79.5 % (ref 43–80)
PDW BLD-RTO: 12.4 FL (ref 11.5–15)
PLATELET # BLD: 166 E9/L (ref 130–450)
PMV BLD AUTO: 10.1 FL (ref 7–12)
POTASSIUM SERPL-SCNC: 4.4 MMOL/L (ref 3.5–5)
PRO-BNP: 123 PG/ML (ref 0–125)
RBC # BLD: 4.3 E12/L (ref 3.5–5.5)
RBC # BLD: NORMAL 10*6/UL
SODIUM BLD-SCNC: 133 MMOL/L (ref 132–146)
TOTAL PROTEIN: 7 G/DL (ref 6.4–8.3)
TROPONIN: <0.01 NG/ML (ref 0–0.03)
WBC # BLD: 4.7 E9/L (ref 4.5–11.5)

## 2019-08-12 PROCEDURE — 83605 ASSAY OF LACTIC ACID: CPT

## 2019-08-12 PROCEDURE — 6370000000 HC RX 637 (ALT 250 FOR IP): Performed by: EMERGENCY MEDICINE

## 2019-08-12 PROCEDURE — 36415 COLL VENOUS BLD VENIPUNCTURE: CPT

## 2019-08-12 PROCEDURE — 99285 EMERGENCY DEPT VISIT HI MDM: CPT

## 2019-08-12 PROCEDURE — 85378 FIBRIN DEGRADE SEMIQUANT: CPT

## 2019-08-12 PROCEDURE — 71046 X-RAY EXAM CHEST 2 VIEWS: CPT

## 2019-08-12 PROCEDURE — 83880 ASSAY OF NATRIURETIC PEPTIDE: CPT

## 2019-08-12 PROCEDURE — 80053 COMPREHEN METABOLIC PANEL: CPT

## 2019-08-12 PROCEDURE — 93005 ELECTROCARDIOGRAM TRACING: CPT | Performed by: EMERGENCY MEDICINE

## 2019-08-12 PROCEDURE — 84484 ASSAY OF TROPONIN QUANT: CPT

## 2019-08-12 PROCEDURE — 93010 ELECTROCARDIOGRAM REPORT: CPT | Performed by: INTERNAL MEDICINE

## 2019-08-12 PROCEDURE — 85025 COMPLETE CBC W/AUTO DIFF WBC: CPT

## 2019-08-12 RX ORDER — OXYCODONE HYDROCHLORIDE AND ACETAMINOPHEN 5; 325 MG/1; MG/1
1 TABLET ORAL ONCE
Status: COMPLETED | OUTPATIENT
Start: 2019-08-12 | End: 2019-08-12

## 2019-08-12 RX ORDER — ASPIRIN 81 MG/1
250 TABLET, CHEWABLE ORAL ONCE
Status: COMPLETED | OUTPATIENT
Start: 2019-08-12 | End: 2019-08-12

## 2019-08-12 RX ADMIN — ASPIRIN 81 MG 243 MG: 81 TABLET ORAL at 14:02

## 2019-08-12 RX ADMIN — OXYCODONE HYDROCHLORIDE AND ACETAMINOPHEN 1 TABLET: 5; 325 TABLET ORAL at 17:18

## 2019-08-12 ASSESSMENT — PAIN DESCRIPTION - LOCATION: LOCATION: CHEST

## 2019-08-12 ASSESSMENT — PAIN SCALES - GENERAL
PAINLEVEL_OUTOF10: 9

## 2019-08-12 ASSESSMENT — PAIN DESCRIPTION - PAIN TYPE: TYPE: ACUTE PAIN

## 2019-08-12 NOTE — ED PROVIDER NOTES
fracture. Past Surgical History:  has a past surgical history that includes fracture surgery (2010); Kyphosis surgery; Lumbar disc surgery; Tibia / fibia lengthening; other surgical history (11/1/11); Abdomen surgery (2008); Echo Complete (4/22/2013); ECHO Compl W Dop Color Flow (8/16/2015); Upper gastrointestinal endoscopy (1/4/2016); Hemorrhoid surgery (12/08/2016); Colonoscopy (2008); Colonoscopy (04/11/2014); Colonoscopy (11/23/2015); Colonoscopy (10/24/2016); Colonoscopy (07/26/2017); and Upper gastrointestinal endoscopy (07/26/2017). Social History:  reports that she quit smoking about 22 years ago. Her smoking use included cigarettes. She has a 18.75 pack-year smoking history. She has never used smokeless tobacco. She reports that she does not drink alcohol or use drugs. Family History: family history includes Alcohol Abuse in her sister; Arthritis in her father and mother; Diabetes in her father and mother; High Blood Pressure in her father, mother, and sister; Substance Abuse in her brother and sister. The patients home medications have been reviewed. Allergies: Patient has no known allergies. ---------------------------------------------------PHYSICAL EXAM--------------------------------------    Constitutional/General: Alert and oriented x3, well appearing, non toxic in NAD  Head: Normocephalic and atraumatic  Eyes: PERRL, EOMI  Mouth: Oropharynx clear, handling secretions, no trismus  Neck: Supple, full ROM, non tender to palpation in the midline, no stridor, no crepitus, no meningeal signs  Pulmonary: Lungs clear to auscultation bilaterally, no wheezes, rales, or rhonchi. Not in respiratory distress  Cardiovascular:  Regular rate. Regular rhythm. No murmurs, gallops, or rubs. 2+ distal pulses  Chest: Chest wall significantly tender to palpation over sternum and under left breast.  No active rash seen, scarring to left back from previous shingles outbreak  Abdomen: Soft.   Non 8 7 - 16 mmol/L    Glucose 125 (H) 74 - 99 mg/dL    BUN 18 8 - 23 mg/dL    CREATININE 0.9 0.5 - 1.0 mg/dL    GFR Non-African American >60 >=60 mL/min/1.73    GFR African American >60     Calcium 9.2 8.6 - 10.2 mg/dL    Total Protein 7.0 6.4 - 8.3 g/dL    Alb 4.1 3.5 - 5.2 g/dL    Total Bilirubin 0.3 0.0 - 1.2 mg/dL    Alkaline Phosphatase 79 35 - 104 U/L    ALT 11 0 - 32 U/L    AST 21 0 - 31 U/L   Lactic Acid, Plasma   Result Value Ref Range    Lactic Acid 2.0 0.5 - 2.2 mmol/L   D-Dimer, Quantitative   Result Value Ref Range    D-Dimer, Quant <200 ng/mL DDU   D-dimer, quantitative   Result Value Ref Range    D-Dimer, Quant <200 ng/mL DDU   EKG 12 Lead   Result Value Ref Range    Ventricular Rate 62 BPM    Atrial Rate 62 BPM    P-R Interval 134 ms    QRS Duration 80 ms    Q-T Interval 416 ms    QTc Calculation (Bazett) 422 ms    P Axis 31 degrees    R Axis 69 degrees    T Axis 60 degrees       RADIOLOGY:  Interpreted by Radiologist.  XR CHEST STANDARD (2 VW)   Final Result   1. Stable, enlarged cardiac mediastinal silhouette with underlying   pulmonary edema and thoracic aortic vascular calcifications. 2. Nonspecific bibasilar airspace disease, findings can be seen in   infiltrate/pneumonia and/or atelectasis. . Suspected small amounts of   bilateral pleural effusion. EKG:  This EKG is signed by emergency department physician. Rate: 62  Rhythm: Sinus  Interpretation: no acute changes  Comparison: stable as compared to patient's most recent EKG 6/27/18      ------------------------- NURSING NOTES AND VITALS REVIEWED ---------------------------   The nursing notes within the ED encounter and vital signs as below have been reviewed by myself.   BP (!) 190/101   Pulse 65   Temp 97.2 °F (36.2 °C) (Oral)   Resp 19   Ht 5' (1.524 m)   Wt 170 lb (77.1 kg)   SpO2 94%   BMI 33.20 kg/m²   Oxygen Saturation Interpretation: Normal    The patients available past medical records and past encounters were

## 2019-08-12 NOTE — ED NOTES
Bed: 14A-14  Expected date:   Expected time:   Means of arrival:   Comments:  EMS     Tawanna Gottron, RN  08/12/19 [id number]

## 2019-08-19 ENCOUNTER — OFFICE VISIT (OUTPATIENT)
Dept: FAMILY MEDICINE CLINIC | Age: 64
End: 2019-08-19
Payer: COMMERCIAL

## 2019-08-19 VITALS
WEIGHT: 189 LBS | HEIGHT: 60 IN | HEART RATE: 64 BPM | OXYGEN SATURATION: 100 % | RESPIRATION RATE: 18 BRPM | DIASTOLIC BLOOD PRESSURE: 75 MMHG | BODY MASS INDEX: 37.11 KG/M2 | TEMPERATURE: 98 F | SYSTOLIC BLOOD PRESSURE: 119 MMHG

## 2019-08-19 DIAGNOSIS — I10 ESSENTIAL HYPERTENSION: ICD-10-CM

## 2019-08-19 DIAGNOSIS — E78.2 MIXED HYPERLIPIDEMIA: ICD-10-CM

## 2019-08-19 DIAGNOSIS — I50.42 CHRONIC COMBINED SYSTOLIC AND DIASTOLIC HEART FAILURE (HCC): ICD-10-CM

## 2019-08-19 DIAGNOSIS — R53.81 DEBILITY: ICD-10-CM

## 2019-08-19 DIAGNOSIS — N18.30 CHRONIC KIDNEY DISEASE, STAGE III (MODERATE) (HCC): ICD-10-CM

## 2019-08-19 DIAGNOSIS — I48.0 PAF (PAROXYSMAL ATRIAL FIBRILLATION) (HCC): ICD-10-CM

## 2019-08-19 DIAGNOSIS — I10 BENIGN ESSENTIAL HYPERTENSION: Primary | ICD-10-CM

## 2019-08-19 DIAGNOSIS — J96.11 CHRONIC RESPIRATORY FAILURE WITH HYPOXIA (HCC): Chronic | ICD-10-CM

## 2019-08-19 DIAGNOSIS — T38.0X5A STEROID-INDUCED AVASCULAR NECROSIS OF RIGHT SHOULDER (HCC): ICD-10-CM

## 2019-08-19 DIAGNOSIS — F33.41 RECURRENT MAJOR DEPRESSIVE DISORDER, IN PARTIAL REMISSION (HCC): ICD-10-CM

## 2019-08-19 DIAGNOSIS — J44.9 COPD, MODERATE (HCC): ICD-10-CM

## 2019-08-19 DIAGNOSIS — M87.111 STEROID-INDUCED AVASCULAR NECROSIS OF RIGHT SHOULDER (HCC): ICD-10-CM

## 2019-08-19 PROCEDURE — 99212 OFFICE O/P EST SF 10 MIN: CPT | Performed by: FAMILY MEDICINE

## 2019-08-19 PROCEDURE — 3023F SPIROM DOC REV: CPT | Performed by: FAMILY MEDICINE

## 2019-08-19 PROCEDURE — G8926 SPIRO NO PERF OR DOC: HCPCS | Performed by: FAMILY MEDICINE

## 2019-08-19 PROCEDURE — G8417 CALC BMI ABV UP PARAM F/U: HCPCS | Performed by: FAMILY MEDICINE

## 2019-08-19 PROCEDURE — 3017F COLORECTAL CA SCREEN DOC REV: CPT | Performed by: FAMILY MEDICINE

## 2019-08-19 PROCEDURE — 99214 OFFICE O/P EST MOD 30 MIN: CPT | Performed by: FAMILY MEDICINE

## 2019-08-19 PROCEDURE — 1036F TOBACCO NON-USER: CPT | Performed by: FAMILY MEDICINE

## 2019-08-19 PROCEDURE — G8427 DOCREV CUR MEDS BY ELIG CLIN: HCPCS | Performed by: FAMILY MEDICINE

## 2019-08-19 RX ORDER — AMLODIPINE BESYLATE 5 MG/1
10 TABLET ORAL DAILY
Qty: 30 TABLET | Refills: 6 | Status: SHIPPED | OUTPATIENT
Start: 2019-08-19 | End: 2020-02-03 | Stop reason: SDUPTHER

## 2019-08-19 RX ORDER — PROMETHAZINE HYDROCHLORIDE AND CODEINE PHOSPHATE 6.25; 1 MG/5ML; MG/5ML
5 SYRUP ORAL 4 TIMES DAILY PRN
Qty: 473 ML | Refills: 2 | Status: SHIPPED | OUTPATIENT
Start: 2019-08-19 | End: 2019-10-18 | Stop reason: SDUPTHER

## 2019-08-19 RX ORDER — ESCITALOPRAM OXALATE 5 MG/1
5 TABLET ORAL DAILY
Qty: 30 TABLET | Refills: 2 | Status: SHIPPED | OUTPATIENT
Start: 2019-08-19 | End: 2019-10-18

## 2019-08-19 RX ORDER — OMEPRAZOLE 40 MG/1
40 CAPSULE, DELAYED RELEASE ORAL
Qty: 30 CAPSULE | Refills: 5 | Status: ON HOLD | OUTPATIENT
Start: 2019-08-19 | End: 2019-11-11

## 2019-08-19 NOTE — PROGRESS NOTES
Mother     High Blood Pressure Mother     Arthritis Father     High Blood Pressure Father     Diabetes Father     High Blood Pressure Sister     Alcohol Abuse Sister     Substance Abuse Brother     Substance Abuse Sister     Coronary Art Dis Neg Hx        Past Surgical History:   Procedure Laterality Date    ABDOMEN SURGERY      ischemic colitis    COLONOSCOPY  2008    healed colitis    COLONOSCOPY  2014    COLONOSCOPY  2015    severe colitis    COLONOSCOPY  10/24/2016    COLONOSCOPY  2017    ECHO COMPL W DOP COLOR FLOW  2015         ECHO COMPLETE  2013         FRACTURE SURGERY  2010    Tibial right    HEMORRHOID SURGERY  2016    KYPHOSIS SURGERY      For comp. fx T10    LUMBAR DISC SURGERY      OTHER SURGICAL HISTORY  11    removal r leg external fixator    TIBIA / Willaim Cornelius LENGTHENING      application TSF  13    UPPER GASTROINTESTINAL ENDOSCOPY  2016    UPPER GASTROINTESTINAL ENDOSCOPY  2017       Social History     Tobacco Use    Smoking status: Former Smoker     Packs/day: 0.75     Years: 25.00     Pack years: 18.75     Types: Cigarettes     Last attempt to quit: 9/10/1996     Years since quittin.9    Smokeless tobacco: Never Used   Substance Use Topics    Alcohol use: No     Alcohol/week: 0.0 standard drinks     Comment: drinks mountain dew and clear pops. \"i drink alot of pop\" maybe 2 liters in a day.  Drug use: No     Comment:  tory       Review of Systems - History obtained from patient  Positives - dyspnea, cough, abd pain (worsening), back and joint pain, depression, anxiety, weight gain, insomnia  Negative - headaches, dizziness, palpitations, N/V    Objective:    VS:  Blood pressure 119/75, pulse 64, temperature 98 °F (36.7 °C), temperature source Oral, resp. rate 18, height 5' (1.524 m), weight 189 lb (85.7 kg), SpO2 100 %, not currently breastfeeding.    General Appearance: wheelchair bound with O2  Chest

## 2019-08-20 ENCOUNTER — TELEPHONE (OUTPATIENT)
Dept: FAMILY MEDICINE CLINIC | Age: 64
End: 2019-08-20

## 2019-08-20 DIAGNOSIS — M17.0 PRIMARY OSTEOARTHRITIS OF BOTH KNEES: ICD-10-CM

## 2019-08-20 DIAGNOSIS — J96.11 CHRONIC RESPIRATORY FAILURE WITH HYPOXIA (HCC): ICD-10-CM

## 2019-08-20 DIAGNOSIS — I50.42 CHRONIC COMBINED SYSTOLIC AND DIASTOLIC HEART FAILURE (HCC): Primary | ICD-10-CM

## 2019-08-20 DIAGNOSIS — R53.81 DEBILITY: ICD-10-CM

## 2019-08-26 DIAGNOSIS — E87.6 HYPOKALEMIA: ICD-10-CM

## 2019-08-26 RX ORDER — POTASSIUM CHLORIDE 1500 MG/1
20 TABLET, FILM COATED, EXTENDED RELEASE ORAL
Qty: 30 TABLET | Refills: 2 | Status: SHIPPED
Start: 2019-08-26 | End: 2020-05-11 | Stop reason: ALTCHOICE

## 2019-08-29 ENCOUNTER — TELEPHONE (OUTPATIENT)
Dept: FAMILY MEDICINE CLINIC | Age: 64
End: 2019-08-29

## 2019-08-29 DIAGNOSIS — M17.0 PRIMARY OSTEOARTHRITIS OF BOTH KNEES: ICD-10-CM

## 2019-08-29 RX ORDER — OXYCODONE AND ACETAMINOPHEN 10; 325 MG/1; MG/1
1 TABLET ORAL EVERY 6 HOURS PRN
Qty: 120 TABLET | Refills: 0 | Status: SHIPPED | OUTPATIENT
Start: 2019-08-29 | End: 2019-09-27 | Stop reason: SDUPTHER

## 2019-09-02 ENCOUNTER — APPOINTMENT (OUTPATIENT)
Dept: GENERAL RADIOLOGY | Age: 64
End: 2019-09-02
Payer: COMMERCIAL

## 2019-09-02 ENCOUNTER — HOSPITAL ENCOUNTER (OUTPATIENT)
Age: 64
Setting detail: OBSERVATION
Discharge: HOME | End: 2019-09-04
Attending: EMERGENCY MEDICINE | Admitting: FAMILY MEDICINE
Payer: COMMERCIAL

## 2019-09-02 ENCOUNTER — APPOINTMENT (OUTPATIENT)
Dept: CT IMAGING | Age: 64
End: 2019-09-02
Payer: COMMERCIAL

## 2019-09-02 DIAGNOSIS — M25.572 ACUTE LEFT ANKLE PAIN: Primary | ICD-10-CM
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
## 2019-09-02 DIAGNOSIS — R26.9 GAIT DISTURBANCE: ICD-10-CM
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
## 2019-09-02 LAB
ALBUMIN SERPL-MCNC: 3.9 G/DL (ref 3.5–5.2)
ALP BLD-CCNC: 77 U/L (ref 35–104)
ALT SERPL-CCNC: 14 U/L (ref 0–32)
ANION GAP SERPL CALCULATED.3IONS-SCNC: 11 MMOL/L (ref 7–16)
AST SERPL-CCNC: 22 U/L (ref 0–31)
BILIRUB SERPL-MCNC: 0.5 MG/DL (ref 0–1.2)
BUN BLDV-MCNC: 27 MG/DL (ref 8–23)
CALCIUM SERPL-MCNC: 9.4 MG/DL (ref 8.6–10.2)
CHLORIDE BLD-SCNC: 91 MMOL/L (ref 98–107)
CO2: 29 MMOL/L (ref 22–29)
CREAT SERPL-MCNC: 1.2 MG/DL (ref 0.5–1)
GFR AFRICAN AMERICAN: 55
GFR NON-AFRICAN AMERICAN: 55 ML/MIN/1.73
GLUCOSE BLD-MCNC: 107 MG/DL (ref 74–99)
HCT VFR BLD CALC: 34.9 % (ref 34–48)
HEMOGLOBIN: 10.6 G/DL (ref 11.5–15.5)
MCH RBC QN AUTO: 26.2 PG (ref 26–35)
MCHC RBC AUTO-ENTMCNC: 30.4 % (ref 32–34.5)
MCV RBC AUTO: 86.4 FL (ref 80–99.9)
PDW BLD-RTO: 12.9 FL (ref 11.5–15)
PLATELET # BLD: 155 E9/L (ref 130–450)
PMV BLD AUTO: 10.5 FL (ref 7–12)
POTASSIUM SERPL-SCNC: 5 MMOL/L (ref 3.5–5)
RBC # BLD: 4.04 E12/L (ref 3.5–5.5)
REASON FOR REJECTION: NORMAL
REJECTED TEST: NORMAL
SODIUM BLD-SCNC: 131 MMOL/L (ref 132–146)
TOTAL PROTEIN: 7.2 G/DL (ref 6.4–8.3)
WBC # BLD: 10.3 E9/L (ref 4.5–11.5)

## 2019-09-02 PROCEDURE — 80053 COMPREHEN METABOLIC PANEL: CPT

## 2019-09-02 PROCEDURE — 6370000000 HC RX 637 (ALT 250 FOR IP): Performed by: EMERGENCY MEDICINE

## 2019-09-02 PROCEDURE — 96374 THER/PROPH/DIAG INJ IV PUSH: CPT

## 2019-09-02 PROCEDURE — 73610 X-RAY EXAM OF ANKLE: CPT

## 2019-09-02 PROCEDURE — 85027 COMPLETE CBC AUTOMATED: CPT

## 2019-09-02 PROCEDURE — 70450 CT HEAD/BRAIN W/O DYE: CPT

## 2019-09-02 PROCEDURE — 73590 X-RAY EXAM OF LOWER LEG: CPT

## 2019-09-02 PROCEDURE — 6360000002 HC RX W HCPCS: Performed by: EMERGENCY MEDICINE

## 2019-09-02 PROCEDURE — 99285 EMERGENCY DEPT VISIT HI MDM: CPT

## 2019-09-02 PROCEDURE — 36415 COLL VENOUS BLD VENIPUNCTURE: CPT

## 2019-09-02 RX ORDER — MORPHINE SULFATE 2 MG/ML
2 INJECTION, SOLUTION INTRAMUSCULAR; INTRAVENOUS ONCE
Status: COMPLETED | OUTPATIENT
Start: 2019-09-02 | End: 2019-09-02

## 2019-09-02 RX ORDER — OXYCODONE HYDROCHLORIDE AND ACETAMINOPHEN 5; 325 MG/1; MG/1
1 TABLET ORAL ONCE
Status: COMPLETED | OUTPATIENT
Start: 2019-09-02 | End: 2019-09-02

## 2019-09-02 RX ADMIN — MORPHINE SULFATE 2 MG: 2 INJECTION, SOLUTION INTRAMUSCULAR; INTRAVENOUS at 21:12

## 2019-09-02 RX ADMIN — OXYCODONE HYDROCHLORIDE AND ACETAMINOPHEN 1 TABLET: 5; 325 TABLET ORAL at 22:43

## 2019-09-02 ASSESSMENT — PAIN DESCRIPTION - DESCRIPTORS: DESCRIPTORS: ACHING

## 2019-09-02 ASSESSMENT — PAIN SCALES - GENERAL
PAINLEVEL_OUTOF10: 10
PAINLEVEL_OUTOF10: 10

## 2019-09-02 ASSESSMENT — PAIN DESCRIPTION - ORIENTATION: ORIENTATION: LEFT

## 2019-09-02 ASSESSMENT — PAIN DESCRIPTION - LOCATION: LOCATION: ANKLE

## 2019-09-02 ASSESSMENT — PAIN DESCRIPTION - PAIN TYPE: TYPE: ACUTE PAIN

## 2019-09-03 PROBLEM — S93.402A SPRAIN OF LEFT ANKLE: Status: ACTIVE | Noted: 2019-09-03

## 2019-09-03 PROBLEM — R26.9 GAIT DISTURBANCE: Status: ACTIVE | Noted: 2019-09-03

## 2019-09-03 LAB
ANION GAP SERPL CALCULATED.3IONS-SCNC: 15 MMOL/L (ref 7–16)
BUN BLDV-MCNC: 22 MG/DL (ref 8–23)
CALCIUM SERPL-MCNC: 9.1 MG/DL (ref 8.6–10.2)
CHLORIDE BLD-SCNC: 92 MMOL/L (ref 98–107)
CO2: 28 MMOL/L (ref 22–29)
CREAT SERPL-MCNC: 1 MG/DL (ref 0.5–1)
GFR AFRICAN AMERICAN: >60
GFR NON-AFRICAN AMERICAN: >60 ML/MIN/1.73
GLUCOSE BLD-MCNC: 84 MG/DL (ref 74–99)
POTASSIUM REFLEX MAGNESIUM: 5 MMOL/L (ref 3.5–5)
SODIUM BLD-SCNC: 135 MMOL/L (ref 132–146)

## 2019-09-03 PROCEDURE — 97165 OT EVAL LOW COMPLEX 30 MIN: CPT

## 2019-09-03 PROCEDURE — G0378 HOSPITAL OBSERVATION PER HR: HCPCS

## 2019-09-03 PROCEDURE — 2700000000 HC OXYGEN THERAPY PER DAY

## 2019-09-03 PROCEDURE — 6370000000 HC RX 637 (ALT 250 FOR IP): Performed by: STUDENT IN AN ORGANIZED HEALTH CARE EDUCATION/TRAINING PROGRAM

## 2019-09-03 PROCEDURE — 36415 COLL VENOUS BLD VENIPUNCTURE: CPT

## 2019-09-03 PROCEDURE — 99222 1ST HOSP IP/OBS MODERATE 55: CPT | Performed by: FAMILY MEDICINE

## 2019-09-03 PROCEDURE — 97530 THERAPEUTIC ACTIVITIES: CPT | Performed by: PHYSICAL THERAPIST

## 2019-09-03 PROCEDURE — 6370000000 HC RX 637 (ALT 250 FOR IP): Performed by: EMERGENCY MEDICINE

## 2019-09-03 PROCEDURE — 97161 PT EVAL LOW COMPLEX 20 MIN: CPT | Performed by: PHYSICAL THERAPIST

## 2019-09-03 PROCEDURE — 97530 THERAPEUTIC ACTIVITIES: CPT

## 2019-09-03 PROCEDURE — 2580000003 HC RX 258: Performed by: STUDENT IN AN ORGANIZED HEALTH CARE EDUCATION/TRAINING PROGRAM

## 2019-09-03 PROCEDURE — 80048 BASIC METABOLIC PNL TOTAL CA: CPT

## 2019-09-03 RX ORDER — METOPROLOL SUCCINATE 100 MG/1
100 TABLET, EXTENDED RELEASE ORAL DAILY
Status: DISCONTINUED | OUTPATIENT
Start: 2019-09-03 | End: 2019-09-05 | Stop reason: HOSPADM

## 2019-09-03 RX ORDER — ESCITALOPRAM OXALATE 10 MG/1
5 TABLET ORAL DAILY
Status: DISCONTINUED | OUTPATIENT
Start: 2019-09-03 | End: 2019-09-05 | Stop reason: HOSPADM

## 2019-09-03 RX ORDER — AMLODIPINE BESYLATE 5 MG/1
10 TABLET ORAL DAILY
Status: DISCONTINUED | OUTPATIENT
Start: 2019-09-03 | End: 2019-09-05 | Stop reason: HOSPADM

## 2019-09-03 RX ORDER — OXYCODONE AND ACETAMINOPHEN 10; 325 MG/1; MG/1
1 TABLET ORAL EVERY 6 HOURS PRN
Status: DISCONTINUED | OUTPATIENT
Start: 2019-09-03 | End: 2019-09-05 | Stop reason: HOSPADM

## 2019-09-03 RX ORDER — FERROUS SULFATE 325(65) MG
325 TABLET ORAL 2 TIMES DAILY
Status: DISCONTINUED | OUTPATIENT
Start: 2019-09-03 | End: 2019-09-05 | Stop reason: HOSPADM

## 2019-09-03 RX ORDER — PREDNISONE 1 MG/1
5 TABLET ORAL DAILY
Status: CANCELLED | OUTPATIENT
Start: 2019-09-03

## 2019-09-03 RX ORDER — ONDANSETRON 2 MG/ML
4 INJECTION INTRAMUSCULAR; INTRAVENOUS EVERY 6 HOURS PRN
Status: DISCONTINUED | OUTPATIENT
Start: 2019-09-03 | End: 2019-09-05 | Stop reason: HOSPADM

## 2019-09-03 RX ORDER — ALBUTEROL SULFATE 90 UG/1
2 AEROSOL, METERED RESPIRATORY (INHALATION) EVERY 6 HOURS PRN
Status: DISCONTINUED | OUTPATIENT
Start: 2019-09-03 | End: 2019-09-05 | Stop reason: HOSPADM

## 2019-09-03 RX ORDER — FAMOTIDINE 20 MG/1
20 TABLET, FILM COATED ORAL 2 TIMES DAILY
Status: DISCONTINUED | OUTPATIENT
Start: 2019-09-03 | End: 2019-09-05 | Stop reason: HOSPADM

## 2019-09-03 RX ORDER — POLYVINYL ALCOHOL 14 MG/ML
1 SOLUTION/ DROPS OPHTHALMIC EVERY 4 HOURS PRN
Status: DISCONTINUED | OUTPATIENT
Start: 2019-09-03 | End: 2019-09-05 | Stop reason: HOSPADM

## 2019-09-03 RX ORDER — PANTOPRAZOLE SODIUM 40 MG/1
40 TABLET, DELAYED RELEASE ORAL
Status: DISCONTINUED | OUTPATIENT
Start: 2019-09-03 | End: 2019-09-05 | Stop reason: HOSPADM

## 2019-09-03 RX ORDER — POTASSIUM CHLORIDE 20 MEQ/1
20 TABLET, EXTENDED RELEASE ORAL
Status: DISCONTINUED | OUTPATIENT
Start: 2019-09-03 | End: 2019-09-05 | Stop reason: HOSPADM

## 2019-09-03 RX ORDER — SODIUM CHLORIDE 0.9 % (FLUSH) 0.9 %
10 SYRINGE (ML) INJECTION PRN
Status: DISCONTINUED | OUTPATIENT
Start: 2019-09-03 | End: 2019-09-05 | Stop reason: HOSPADM

## 2019-09-03 RX ORDER — SODIUM CHLORIDE 0.9 % (FLUSH) 0.9 %
10 SYRINGE (ML) INJECTION EVERY 12 HOURS SCHEDULED
Status: DISCONTINUED | OUTPATIENT
Start: 2019-09-03 | End: 2019-09-05 | Stop reason: HOSPADM

## 2019-09-03 RX ORDER — OMEGA-3 FATTY ACIDS/FISH OIL 300-1000MG
1 CAPSULE ORAL DAILY
Status: DISCONTINUED | OUTPATIENT
Start: 2019-09-03 | End: 2019-09-03 | Stop reason: CLARIF

## 2019-09-03 RX ORDER — LEVOTHYROXINE SODIUM 0.07 MG/1
75 TABLET ORAL DAILY
Status: DISCONTINUED | OUTPATIENT
Start: 2019-09-03 | End: 2019-09-05 | Stop reason: HOSPADM

## 2019-09-03 RX ORDER — OXYCODONE HYDROCHLORIDE AND ACETAMINOPHEN 5; 325 MG/1; MG/1
1 TABLET ORAL ONCE
Status: COMPLETED | OUTPATIENT
Start: 2019-09-03 | End: 2019-09-03

## 2019-09-03 RX ADMIN — Medication 10 ML: at 20:42

## 2019-09-03 RX ADMIN — MOMETASONE FUROATE AND FORMOTEROL FUMARATE DIHYDRATE 2 PUFF: 200; 5 AEROSOL RESPIRATORY (INHALATION) at 20:42

## 2019-09-03 RX ADMIN — POTASSIUM CHLORIDE 20 MEQ: 20 TABLET, EXTENDED RELEASE ORAL at 09:28

## 2019-09-03 RX ADMIN — LEVOTHYROXINE SODIUM 75 MCG: 0.07 TABLET ORAL at 10:45

## 2019-09-03 RX ADMIN — OXYCODONE HYDROCHLORIDE AND ACETAMINOPHEN 1 TABLET: 10; 325 TABLET ORAL at 20:48

## 2019-09-03 RX ADMIN — MUPIROCIN: 20 OINTMENT TOPICAL at 09:28

## 2019-09-03 RX ADMIN — OXYCODONE HYDROCHLORIDE AND ACETAMINOPHEN 1 TABLET: 10; 325 TABLET ORAL at 06:05

## 2019-09-03 RX ADMIN — MUPIROCIN: 20 OINTMENT TOPICAL at 20:45

## 2019-09-03 RX ADMIN — FERROUS SULFATE TAB 325 MG (65 MG ELEMENTAL FE) 325 MG: 325 (65 FE) TAB at 20:41

## 2019-09-03 RX ADMIN — APIXABAN 5 MG: 5 TABLET, FILM COATED ORAL at 09:28

## 2019-09-03 RX ADMIN — PANTOPRAZOLE SODIUM 40 MG: 40 TABLET, DELAYED RELEASE ORAL at 06:06

## 2019-09-03 RX ADMIN — APIXABAN 5 MG: 5 TABLET, FILM COATED ORAL at 20:41

## 2019-09-03 RX ADMIN — OXYCODONE HYDROCHLORIDE AND ACETAMINOPHEN 1 TABLET: 10; 325 TABLET ORAL at 13:09

## 2019-09-03 RX ADMIN — FERROUS SULFATE TAB 325 MG (65 MG ELEMENTAL FE) 325 MG: 325 (65 FE) TAB at 09:28

## 2019-09-03 RX ADMIN — OXYCODONE AND ACETAMINOPHEN 1 TABLET: 5; 325 TABLET ORAL at 00:53

## 2019-09-03 RX ADMIN — ESCITALOPRAM 5 MG: 10 TABLET, FILM COATED ORAL at 10:45

## 2019-09-03 RX ADMIN — Medication 10 ML: at 10:02

## 2019-09-03 RX ADMIN — FAMOTIDINE 20 MG: 20 TABLET ORAL at 09:28

## 2019-09-03 RX ADMIN — FAMOTIDINE 20 MG: 20 TABLET ORAL at 20:41

## 2019-09-03 ASSESSMENT — PAIN SCALES - GENERAL
PAINLEVEL_OUTOF10: 9
PAINLEVEL_OUTOF10: 9
PAINLEVEL_OUTOF10: 10
PAINLEVEL_OUTOF10: 10
PAINLEVEL_OUTOF10: 9
PAINLEVEL_OUTOF10: 4
PAINLEVEL_OUTOF10: 9

## 2019-09-03 ASSESSMENT — PAIN DESCRIPTION - LOCATION
LOCATION: ANKLE
LOCATION: ANKLE
LOCATION: ANKLE;BACK
LOCATION: ANKLE

## 2019-09-03 ASSESSMENT — PAIN DESCRIPTION - PROGRESSION
CLINICAL_PROGRESSION: NOT CHANGED

## 2019-09-03 ASSESSMENT — PAIN DESCRIPTION - ORIENTATION
ORIENTATION: LEFT

## 2019-09-03 ASSESSMENT — PAIN DESCRIPTION - DESCRIPTORS
DESCRIPTORS: CONSTANT;SHARP;SHOOTING

## 2019-09-03 ASSESSMENT — PAIN DESCRIPTION - PAIN TYPE
TYPE: ACUTE PAIN

## 2019-09-03 ASSESSMENT — PAIN DESCRIPTION - ONSET
ONSET: ON-GOING
ONSET: ON-GOING

## 2019-09-03 NOTE — PROGRESS NOTES
[facility]  Family Medicine Attending    S: 59 y.o. female with COPD with hypoxia on home O2, sarcoidosis, chronic pain, HTN, CKD, DI and hypothyroidism, AVN of shoulder, who presents after a fall at home. She states that she was attempting to get to her bedside commode when she twisted her left ankle and fell. Unable to bear weight on her left leg, so she presented to ER. Previous deformity from old fracture, but otherwise, no abnormalities on exam or x-ray except for extreme tenderness. This morning, she continues to complain of pain and inability to bear weight. O: VS- Blood pressure 134/79, pulse (!) 4, temperature 98.6 °F (37 °C), temperature source Temporal, resp. rate 18, height 5' (1.524 m), weight 189 lb (85.7 kg), SpO2 94 %, not currently breastfeeding. Exam is as noted by resident with the following changes, additions or corrections:  Awake, alert  Heart- RRR  Lungs- markedly decreased BS with scattered rhonchi  Left ankle - obvious bony deformity, but no significant swelling, erythema, or bruising   Cries out in pain with attempts at palpation or ROM exam    Impressions: Active Problems:    Depression    Primary hypothyroidism    Diabetes insipidus, neurohypophyseal ([facility] [address]    Sarcoidosis    Steroid-induced avascular necrosis of shoulder (HCC)    Chronic kidney disease, stage III (moderate) (HCC)    Chronic respiratory failure with hypoxia (HCC)    Gait disturbance    Sprain of left ankle  Resolved Problems:    * No resolved hospital problems. *      Plan:   RICE   PT/OT/ social work   Continue all home meds   Patient states that she would like to go home eventually, and does not want [facility]. I explained that she would need to go home with home PT soon or go to [facility]. If she feels capable of going home, could potentially go soon. Otherwise, she will have to go to [facility]. Attending Physician Statement  I have reviewed the chart and seen the patient with the resident(s).   I personally reviewed images, EKG's and similar tests, if present. I personally reviewed and performed key elements of the history and exam.  I have reviewed and confirmed student and/or resident history and exam with changes as indicated above. I agree with the assessment, plan and orders as documented by the resident. Please refer to the resident and/or student note for additional information.       Abdulkadir Vogel

## 2019-09-03 NOTE — ED PROVIDER NOTES
surgery (2010); Kyphosis surgery; Lumbar disc surgery; Tibia / fibia lengthening; other surgical history (11/1/11); Abdomen surgery (2008); Echo Complete (4/22/2013); ECHO Compl W Dop Color Flow (8/16/2015); Upper gastrointestinal endoscopy (1/4/2016); Hemorrhoid surgery (12/08/2016); Colonoscopy (2008); Colonoscopy (04/11/2014); Colonoscopy (11/23/2015); Colonoscopy (10/24/2016); Colonoscopy (07/26/2017); and Upper gastrointestinal endoscopy (07/26/2017). Social History:  reports that she quit smoking about 22 years ago. Her smoking use included cigarettes. She has a 18.75 pack-year smoking history. She has never used smokeless tobacco. She reports that she does not drink alcohol or use drugs. Family History: family history includes Alcohol Abuse in her sister; Arthritis in her father and mother; Diabetes in her father and mother; High Blood Pressure in her father, mother, and sister; Substance Abuse in her brother and sister. The patients home medications have been reviewed. Allergies: Patient has no known allergies. ---------------------------------------------------PHYSICAL EXAM--------------------------------------    Constitutional/General: Alert and oriented x3, mild distress  Head: Normocephalic and atraumatic  Eyes: PERRL, EOMI  Mouth: Oropharynx clear, handling secretions, no trismus  Neck: Supple, full ROM, non tender to palpation in the midline, no stridor, no crepitus, no meningeal signs  Pulmonary: Lungs wheezes on exam  Cardiovascular:  Regular rate. Regular rhythm. No murmurs, gallops, or rubs. 2+ distal pulses  Chest: no chest wall tenderness  Abdomen: Soft. Non tender. Non distended. +BS. No rebound, guarding, or rigidity. No pulsatile masses appreciated. Musculoskeletal: Moves all extremities x 4. Limited Range of motion of left ankle secondary to pain noted deformity, pulses are intact.   Patient reports this is normal appearing ankle but she injured it today with 3 VIEWS)   Final Result      No acute fracture seen      Old proximal and distal fibular fracture with callus formation   unchanged from prior study. Antonia Eastman ------------------------- NURSING NOTES AND VITALS REVIEWED ---------------------------   The nursing notes within the ED encounter and vital signs as below have been reviewed by myself. /72   Pulse 84   Temp 98 °F (36.7 °C) (Oral)   Resp 16   Ht 5' (1.524 m)   Wt 189 lb (85.7 kg)   SpO2 93%   BMI 36.91 kg/m²   Oxygen Saturation Interpretation: Normal    The patients available past medical records and past encounters were reviewed. ------------------------------ ED COURSE/MEDICAL DECISION MAKING----------------------  Medications   morphine (PF) injection 2 mg (2 mg Intravenous Given 9/2/19 2112)   oxyCODONE-acetaminophen (PERCOCET) 5-325 MG per tablet 1 tablet (1 tablet Oral Given 9/2/19 2243)   oxyCODONE-acetaminophen (PERCOCET) 5-325 MG per tablet 1 tablet (1 tablet Oral Given 9/3/19 0053)             Medical Decision Making:        Re-Evaluations:             Re-evaluation. Patients symptoms show no change    We will recheck on patient patient was still complaining of pain in her left ankle. Patient reports she has a deformity to the ankle anyway. Patient also now complaining of some headache. She again reports she did not hit her head. Patient is on anticoagulant. Patient was ordered CT and ordered medication. Patient medicated and unwilling to walk and reports pain patient uses a walker at home and does not feel well enough to go home. Consultations:        [facility]         Critical Care: This patient's ED course included: a personal history and physicial eaxmination    This patient has remained hemodynamically stable during their ED course. Counseling:    The emergency provider has spoken with the patient and discussed todays results, in addition to providing specific details for the plan of care and

## 2019-09-03 NOTE — PROGRESS NOTES
Date:9/3/2019  Patient Name: Mariluz Lopez  MRN: [id number]  : 1955  ROOM #: [id number]    Occupational Therapy order received, chart reviewed and evaluation attempted this date. Patient is unavailable for OT evaluation due to pt agreeable to therapy but requesting therapy come back after lunch after pain meds administered (next meds due at 12:05 per QASIM Zhang). Will attempt OT evaluation at a later time. Thank you.      Efren Del Rio OTR/NICA [id number]

## 2019-09-03 NOTE — PROGRESS NOTES
([address]     Right    Tibia fracture 7/10      Precautions: Falls, FWB BLE, O2, desats quickly with activity, purewick catheter     Home Living: Pt lives with spouse in a 1 story home with 4 steps to enter with Bilateral rail  Bathroom Setup: tub shower with shower seat and grab bars  Equipment owned: BSC, 4L home O2, wheeled walker, wheelchair    Prior Level of Function: independent with ADLs, assist with IADLs; ambulated with wheeled walker in home, wheelchair for community distances  Driving: no    Pain Level: pt c/o 10/10 LLE, 7/10 R hip pain this session; nursing aware and in to administer meds  Cognition: A&O: 4/4; Follows 1-2 step directions   Memory:  good     Sequencing:  fair     Problem solving:  fair     Judgement/safety:  fair       Functional Assessment:   Initial Eval Status  Date: 9/3/19 Treatment Status  Date: Short Term Goals  Treatment frequency: PRN   Feeding Set up   Independent    Grooming Minimal Assist EOB  Modified Beatrice    UB Dressing Moderate Assist   Modified Beatrice    LB Dressing Maximal Assist   Modified Beatrice    Bathing Maximal Assist  Modified Beatrice    Toileting Maximal Assist  Modified Beatrice    Bed Mobility  Supine to sit: Minimal Assist   Sit to supine: Moderate Assist   Supine to sit: Independent   Sit to supine: Independent    Functional Transfers Unable to stand with assist x 2, pt would not bear weight through LLE   Moderate Assist    Functional Mobility n/t   Moderate Assist with AAD   Balance Sitting:     Static: SBA    Dynamic: intermittent Min A  Standing: n/t     Activity Tolerance Poor, limited by pain and o2 sat. SpO2 88% on 4L nc at rest, nursing bumped up to 5L. Pt dropped to 80-83% with light activity (bed mobility), pt educated no PLB, recovered to 90%, pt 90% on 5L upon exit.   fair   Visual/  Perceptual Glasses: no                UE Assessment:  Hand Dominance: Right [x]  Left []   Strength ROM Additional Info:    RUE  2+/5 proximally, 3/5 distally WFL with exception of limited shoulder flexion Fair-  and fair FMC/dexterity noted during ADL tasks   LUE 2+/5 proximally, 3/5 distally WFL with exception of limited shoulder flexion Fair-  and fair FMC/dexterity noted during ADL tasks     Hearing: WFL  Sensation:  No c/o numbness or tingling  Tone: WNL  Edema: yes LLE                            Comments/Treatment: Upon arrival, patient supine in bed. OT eval completed. Therapist facilitated: Bed mobility, sitting balance at EOB for 10 minutes to increase dynamic sitting balance and activity tolerance, transfer training with verbal cues for hand placement, pt unable to achieve stand. Pt educated on pursed lip breathing to maintain o2 saturation. Pt supine in bed at end of eval.  Call light and phone in reach. All lines and tubes intact. Pt would benefit from continued skilled OT to increase safety and independence with completion of ADL/IADL tasks for functional independence and quality of life.     Eval Complexity: Low    Assessment of current deficits:   Functional mobility [x]  ADLs [x] Strength [x]  Cognition []  Functional transfers  [x] IADLs [x] Safety Awareness [x]  Endurance [x]  Fine Motor Coordination [x] Balance [x] Vision/perception [] Sensation []   Gross Motor Coordination [] ROM [x] Delirium []                  Motor Control []    Plan of Care:   ADL retraining [x]   Equipment needs [x]   Neuromuscular re-education [x] Energy Conservation Techniques [x]  Functional Transfer training [x] Patient and/or Family Education [x]  Functional Mobility training [x]  Environmental Modifications [x]  Cognitive re-training []   Compensatory techniques for ADLs [x]  Splinting Needs []   Positioning to improve overall function [x]   Therapeutic Activity [x]  Therapeutic Exercise  [x]  Visual/Perceptual: []    Delirium prevention/treatment  []   Other:  []    Rehab Potential: Good for established goals     Patient / Family Goal: None stated

## 2019-09-03 NOTE — H&P
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    Osteoarthritis     severe    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HCC)     PCWP mean:26.0 PA mean: 24.00; denies any recent issues    Pulmonary hypertension (HCC)     ventricular diastolic function consistent with abnormal relaxation (stage I)    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, f/u w/ PCP    Rhabdomyolysis 5/9/2011    Steroid-induced avascular necrosis of shoulder ([address]     Right    Tibia fracture 7/10       Past Surgical History:        Procedure Laterality Date    ABDOMEN SURGERY  2008    ischemic colitis    COLONOSCOPY  2008    healed colitis    COLONOSCOPY  04/11/2014    COLONOSCOPY  11/23/2015    severe colitis    COLONOSCOPY  10/24/2016    COLONOSCOPY  07/26/2017    ECHO COMPL W DOP COLOR FLOW  8/16/2015         ECHO COMPLETE  4/22/2013         FRACTURE SURGERY  2010    Tibial right    HEMORRHOID SURGERY  12/08/2016    KYPHOSIS SURGERY      For comp. fx T10    LUMBAR DISC SURGERY      OTHER SURGICAL HISTORY  11/1/11    removal r leg external fixator    TIBIA / Samantha Abdi LENGTHENING      application TSF  7/3/60    UPPER GASTROINTESTINAL ENDOSCOPY  1/4/2016    UPPER GASTROINTESTINAL ENDOSCOPY  07/26/2017       Medications Prior to Admission:    Prior to Admission medications    Medication Sig Start Date End Date Taking? Authorizing Provider   mupirocin (BACTROBAN) 2 % ointment Apply 3 times daily. 8/30/19 9/6/19  Pattiam Number, DO   oxyCODONE-acetaminophen (PERCOCET)  MG per tablet Take 1 tablet by mouth every 6 hours as needed for Pain for up to 30 days.  Intended supply: 30 days 8/29/19 9/28/19  Veronique Hi MD   potassium chloride (KLOR-CON M) 20 MEQ TBCR patient  · Psychology: depressed mood, no suicidal ideation    Physical Exam   · Vitals: /72   Pulse 84   Temp 98 °F (36.7 °C) (Oral)   Resp 16   Ht 5' (1.524 m)   Wt 189 lb (85.7 kg)   SpO2 93%   BMI 36.91 kg/m²   · General Appearance: Patient sleeping comfortably on the bed at rest, does not appear to be in acute distress  · HEENT: Normocephalic, atraumatic. ANDRÉS, conjunctiva/corneas clear, EOM's intact, pallor  or icterus. · Neck: Supple, symmetrical, trachea midline, no cervical LAD.  enlargement/tenderness/nodules. No carotid bruit or JVD  · Chest wall: No tenderness or deformity, full & symmetric excursion  · Lung: Bilateral wheezes heard with good air movement  · Heart: Regular rate and rhythm, S1 and S2 normal, no murmur, rub or   gallop. no bruits (carotid/femoral/abd), pedal pulses 2+,  no edema  · Abdomen: 15 cm circular abdominal swelling noted around umbilicus patient states has been there for long time, soft, non-tender, bowel sounds active all four quadrants, no masses, no organomegaly  · Genital and rectal exam: deferred  · Extremities: No edema. Pulses equal bilaterally  · Skin: Skin color, texture, turgor normal, no rashes or lesions  · Musculokeletal: Left ankle :swelling at denies patient states chronically present, extremely tender to palpation patient starts shouting on touching her feet, limited range of motion. Right ankle exam normal noTenderness to palpation, range of motion painful   · Lymph nodes: no lymph node enlargement apprciated  · Neurologic:   Alert&Oriented. CNII-XII intact.    Normal and symmetric  strength in UEs and LEs,        Labs and Imaging Studies   Basic Labs  Results for orders placed or performed during the hospital encounter of 09/02/19   CBC   Result Value Ref Range    WBC 10.3 4.5 - 11.5 E9/L    RBC 4.04 3.50 - 5.50 E12/L    Hemoglobin 10.6 (L) 11.5 - 15.5 g/dL    Hematocrit 34.9 34.0 - 48.0 %    MCV 86.4 80.0 - 99.9 fL    MCH 26.2 26.0 - 35.0 pg

## 2019-09-03 NOTE — PROGRESS NOTES
[address] [place] [address]  Physical Therapy Initial Evaluation    Name: Niru Valles  : 1955  MRN: [id number]    Date of Service: 9/3/2019        Evaluating Therapist: Khurram Malave PT, DPT       Equipment Recommendations: to be determined. Room #: 8854/4419-V  DIAGNOSIS: gait disturbance, fall   PRECAUTIONS: fall risk, O2    Social:  Pt lives with  in a 1 floor plan 4 steps and 2 wide rails to enter. Prior to admission pt walked with w/w and uses w/c outside of home. Initial Evaluation  Date: 9/3 Treatment      Short Term/ Long Term   Goals   Was pt agreeable to Eval/treatment? yes     Does pt have pain? 10/10 L lower leg and 7/10 R hip. Bed Mobility  Rolling: NT  Supine to sit: min a   Sit to supine: mod A   Scooting: NT  SBA   Transfers Sit to stand: attempted with A x 2 but pt unable - see below. Stand to sit: NT  Stand pivot: NT  Mod A    Ambulation    NT   10+ feet with w/w with min A    Stair negotiation: ascended and descended  NT  NT   AM-PAC Raw Score 10/24       Pt is alert and Oriented x 3  ROM:  L LE grossly WFL except ankle limited. R LE grossly WFL  Strength:   L LE grossly 2/5 ankle, grossly at least 3/5 knee and hip   R LE grossly at least 3/5  Sensation: no c/o numbness/tingling. Endurance: poor       ASSESSMENT  Pt displays functional ability as noted in the objective portion of this evaluation. Comments/Treatment:  Pt found laying in bed agreeable to evaluation. Pt instructed in mobility. Pt's O2 sat monitored during session. 88% on 4L O2 at rest beginning of session. RN increased pt to 5L O2. Pt drops multiple times into lower 80's with activity and raise to low 90's with cues for proper breathing technique. RN also in to medicate pt for pain during session. Pt not putting any weight through L LE with attempts for sit to stand due to pain. Pt left laying in bed with call button in reach end of evaluation.      Patient education  Pt educated on

## 2019-09-03 NOTE — ED NOTES
Yelling out from room this nurses name for help. Entered room to meet patients needs. Education provided on use of call light that is in reach. Verbalized understanding.       Jake Nino RN  09/03/19 0319
no

## 2019-09-03 NOTE — PROGRESS NOTES
Pharmacy Note    Jared Rojo was ordered Omega 3 capsules. As per the [address] [place] [address] Place, herbals and certain dietary supplements will be discontinued.   The herbal or dietary supplement may be continued after discharge from the hospital.

## 2019-09-04 ENCOUNTER — APPOINTMENT (OUTPATIENT)
Dept: GENERAL RADIOLOGY | Age: 64
End: 2019-09-04
Payer: COMMERCIAL

## 2019-09-04 VITALS
RESPIRATION RATE: 16 BRPM | DIASTOLIC BLOOD PRESSURE: 59 MMHG | WEIGHT: 189 LBS | OXYGEN SATURATION: 97 % | HEART RATE: 110 BPM | SYSTOLIC BLOOD PRESSURE: 117 MMHG | TEMPERATURE: 98 F | BODY MASS INDEX: 37.11 KG/M2 | HEIGHT: 60 IN

## 2019-09-04 PROBLEM — S93.402A SPRAIN OF LEFT ANKLE: Status: RESOLVED | Noted: 2019-09-03 | Resolved: 2019-09-04

## 2019-09-04 LAB
ANION GAP SERPL CALCULATED.3IONS-SCNC: 12 MMOL/L (ref 7–16)
BASOPHILS ABSOLUTE: 0.02 E9/L (ref 0–0.2)
BASOPHILS RELATIVE PERCENT: 0.3 % (ref 0–2)
BUN BLDV-MCNC: 14 MG/DL (ref 8–23)
CALCIUM SERPL-MCNC: 8.9 MG/DL (ref 8.6–10.2)
CHLORIDE BLD-SCNC: 98 MMOL/L (ref 98–107)
CO2: 30 MMOL/L (ref 22–29)
CREAT SERPL-MCNC: 1.1 MG/DL (ref 0.5–1)
EOSINOPHILS ABSOLUTE: 0.14 E9/L (ref 0.05–0.5)
EOSINOPHILS RELATIVE PERCENT: 1.9 % (ref 0–6)
GFR AFRICAN AMERICAN: >60
GFR NON-AFRICAN AMERICAN: >60 ML/MIN/1.73
GLUCOSE BLD-MCNC: 109 MG/DL (ref 74–99)
HCT VFR BLD CALC: 33.2 % (ref 34–48)
HEMOGLOBIN: 10.1 G/DL (ref 11.5–15.5)
IMMATURE GRANULOCYTES #: 0.03 E9/L
IMMATURE GRANULOCYTES %: 0.4 % (ref 0–5)
LYMPHOCYTES ABSOLUTE: 0.62 E9/L (ref 1.5–4)
LYMPHOCYTES RELATIVE PERCENT: 8.4 % (ref 20–42)
MCH RBC QN AUTO: 26 PG (ref 26–35)
MCHC RBC AUTO-ENTMCNC: 30.4 % (ref 32–34.5)
MCV RBC AUTO: 85.6 FL (ref 80–99.9)
MONOCYTES ABSOLUTE: 1.02 E9/L (ref 0.1–0.95)
MONOCYTES RELATIVE PERCENT: 13.8 % (ref 2–12)
NEUTROPHILS ABSOLUTE: 5.54 E9/L (ref 1.8–7.3)
NEUTROPHILS RELATIVE PERCENT: 75.2 % (ref 43–80)
PDW BLD-RTO: 13.4 FL (ref 11.5–15)
PLATELET # BLD: 137 E9/L (ref 130–450)
PMV BLD AUTO: 10.5 FL (ref 7–12)
POTASSIUM REFLEX MAGNESIUM: 4.4 MMOL/L (ref 3.5–5)
RBC # BLD: 3.88 E12/L (ref 3.5–5.5)
SODIUM BLD-SCNC: 140 MMOL/L (ref 132–146)
WBC # BLD: 7.4 E9/L (ref 4.5–11.5)

## 2019-09-04 PROCEDURE — 80048 BASIC METABOLIC PNL TOTAL CA: CPT

## 2019-09-04 PROCEDURE — G0378 HOSPITAL OBSERVATION PER HR: HCPCS

## 2019-09-04 PROCEDURE — 99239 HOSP IP/OBS DSCHRG MGMT >30: CPT | Performed by: FAMILY MEDICINE

## 2019-09-04 PROCEDURE — 73502 X-RAY EXAM HIP UNI 2-3 VIEWS: CPT

## 2019-09-04 PROCEDURE — 85025 COMPLETE CBC W/AUTO DIFF WBC: CPT

## 2019-09-04 PROCEDURE — 36415 COLL VENOUS BLD VENIPUNCTURE: CPT

## 2019-09-04 PROCEDURE — 6370000000 HC RX 637 (ALT 250 FOR IP): Performed by: STUDENT IN AN ORGANIZED HEALTH CARE EDUCATION/TRAINING PROGRAM

## 2019-09-04 PROCEDURE — 2580000003 HC RX 258: Performed by: STUDENT IN AN ORGANIZED HEALTH CARE EDUCATION/TRAINING PROGRAM

## 2019-09-04 PROCEDURE — 2700000000 HC OXYGEN THERAPY PER DAY

## 2019-09-04 RX ADMIN — FERROUS SULFATE TAB 325 MG (65 MG ELEMENTAL FE) 325 MG: 325 (65 FE) TAB at 10:37

## 2019-09-04 RX ADMIN — MUPIROCIN: 20 OINTMENT TOPICAL at 20:15

## 2019-09-04 RX ADMIN — OXYCODONE HYDROCHLORIDE AND ACETAMINOPHEN 1 TABLET: 10; 325 TABLET ORAL at 10:36

## 2019-09-04 RX ADMIN — OXYCODONE HYDROCHLORIDE AND ACETAMINOPHEN 1 TABLET: 10; 325 TABLET ORAL at 19:29

## 2019-09-04 RX ADMIN — METOPROLOL SUCCINATE 100 MG: 100 TABLET, EXTENDED RELEASE ORAL at 10:37

## 2019-09-04 RX ADMIN — Medication 10 ML: at 10:40

## 2019-09-04 RX ADMIN — FAMOTIDINE 20 MG: 20 TABLET ORAL at 10:36

## 2019-09-04 RX ADMIN — POTASSIUM CHLORIDE 20 MEQ: 20 TABLET, EXTENDED RELEASE ORAL at 10:37

## 2019-09-04 RX ADMIN — OXYCODONE HYDROCHLORIDE AND ACETAMINOPHEN 1 TABLET: 10; 325 TABLET ORAL at 02:56

## 2019-09-04 RX ADMIN — APIXABAN 5 MG: 5 TABLET, FILM COATED ORAL at 10:38

## 2019-09-04 RX ADMIN — FAMOTIDINE 20 MG: 20 TABLET ORAL at 20:15

## 2019-09-04 RX ADMIN — FERROUS SULFATE TAB 325 MG (65 MG ELEMENTAL FE) 325 MG: 325 (65 FE) TAB at 20:15

## 2019-09-04 RX ADMIN — MOMETASONE FUROATE AND FORMOTEROL FUMARATE DIHYDRATE 2 PUFF: 200; 5 AEROSOL RESPIRATORY (INHALATION) at 10:39

## 2019-09-04 RX ADMIN — MUPIROCIN: 20 OINTMENT TOPICAL at 10:40

## 2019-09-04 RX ADMIN — MOMETASONE FUROATE AND FORMOTEROL FUMARATE DIHYDRATE 2 PUFF: 200; 5 AEROSOL RESPIRATORY (INHALATION) at 20:15

## 2019-09-04 RX ADMIN — PANTOPRAZOLE SODIUM 40 MG: 40 TABLET, DELAYED RELEASE ORAL at 06:55

## 2019-09-04 RX ADMIN — APIXABAN 5 MG: 5 TABLET, FILM COATED ORAL at 20:15

## 2019-09-04 RX ADMIN — AMLODIPINE BESYLATE 10 MG: 5 TABLET ORAL at 10:37

## 2019-09-04 RX ADMIN — LEVOTHYROXINE SODIUM 75 MCG: 0.07 TABLET ORAL at 10:37

## 2019-09-04 RX ADMIN — ESCITALOPRAM 5 MG: 10 TABLET, FILM COATED ORAL at 10:36

## 2019-09-04 ASSESSMENT — PAIN DESCRIPTION - PROGRESSION: CLINICAL_PROGRESSION: NOT CHANGED

## 2019-09-04 ASSESSMENT — PAIN SCALES - GENERAL
PAINLEVEL_OUTOF10: 9
PAINLEVEL_OUTOF10: 10
PAINLEVEL_OUTOF10: 10
PAINLEVEL_OUTOF10: 9
PAINLEVEL_OUTOF10: 8
PAINLEVEL_OUTOF10: 4

## 2019-09-04 ASSESSMENT — PAIN DESCRIPTION - LOCATION
LOCATION: ANKLE;BACK
LOCATION: ANKLE;BACK

## 2019-09-04 ASSESSMENT — PAIN DESCRIPTION - ORIENTATION
ORIENTATION: LEFT
ORIENTATION: LEFT

## 2019-09-04 ASSESSMENT — PAIN DESCRIPTION - PAIN TYPE
TYPE: ACUTE PAIN
TYPE: ACUTE PAIN

## 2019-09-04 ASSESSMENT — PAIN DESCRIPTION - ONSET: ONSET: ON-GOING

## 2019-09-04 ASSESSMENT — PAIN DESCRIPTION - DESCRIPTORS: DESCRIPTORS: CONSTANT;SHARP;SHOOTING

## 2019-09-04 NOTE — DISCHARGE SUMMARY
doctor about these medications    mupirocin 2 % ointment  Commonly known as:  BACTROBAN  Apply 3 times daily. Ask about: Should I take this medication?     promethazine-codeine 6.25-10 MG/5ML syrup  Commonly known as:  PHENERGAN with CODEINE  Take 5 mLs by mouth 4 times daily as needed for Cough for up to 20 days. Ask about: Should I take this medication? Consults:  PT/OT/SW    Significant Diagnostic Studies:  As below    Labs:  Na/K/Cl/CO2:  135/5.0/92/28 ( 5114)  BUN/Cr/glu/ALT/AST/amyl/lip:  22/1.0/--/--/--/--/-- ( 906)  WBC/Hgb/Hct/Plts:  10.3/10.6/34.9/155 (2100)  estimated creatinine clearance is 57 mL/min (based on SCr of 1 mg/dL). New Imaging:  Xr Chest Standard (2 Vw)    Result Date: 2019  Patient MRN: [id number] : 1955 Age:  59 years Gender: Female Order Date: 2019 2:00 PM Exam: XR CHEST (2 VW) Number of Views: 2 Indication:   Chest pain Comparison: 2018 Findings: There is a stable, enlarged cardiomediastinal silhouette with underlying pulmonary edema, bibasilar airspace disease and suspected small amounts of bilateral pleural fluid. Vascular calcifications thoracic aorta. No pneumothorax. 1. Stable, enlarged cardiac mediastinal silhouette with underlying pulmonary edema and thoracic aortic vascular calcifications. 2. Nonspecific bibasilar airspace disease, findings can be seen in infiltrate/pneumonia and/or atelectasis. . Suspected small amounts of bilateral pleural effusion. Xr Hip Right (2-3 Views)    Result Date: 2019  Patient MRN:  [id number] : 1955 Age: 59 years Gender: Female Order Date:  2019 7:30 AM EXAM: XR HIP RIGHT (2-3 VIEWS) NUMBER OF IMAGES:  2 INDICATION:  Acute fall with right hip pain pain COMPARISON: 2014 FINDINGS: The bones are aligned anatomically. No evidence of fracture or dislocation. Normal mineralization. Soft tissues unremarkable. Mild osteoarthritis of the right hip.      No acute fracture or

## 2019-09-04 NOTE — PROGRESS NOTES
unchanged from 01/15/2014 study. Susana Tong Ankle Left (min 3 Views)    Result Date: 2019  Patient MRN: [id number] : 1955 Age:  59 years Gender: Female Order Date: 2019 8:30 PM Exam: XR ANKLE LEFT (MIN 3 VIEWS) Number of Images: 4 views Indication:   Pain Pain Comparison: Prior study from 2011 is available Findings: There is an old fracture of the distal fibula with callus formation and proximal fibular neck fracture with callus formation. There is no acute fracture identified. This findings was also seen on 2011 radiograph. .     No acute fracture seen Old proximal and distal fibular fracture with callus formation unchanged from prior study. .     Ct Head Wo Contrast    Result Date: 2019  Clinical indications: Headaches COMPARISON: 2017. May 2, 2017. Exposure control: This examination and all examinations utilizing ionizing radiation at this facility done so according to the ALARA (as low as reasonably achievable) principal for radiation dose reduction. Technique: Axial, sagittal and coronal computed tomography of the brain and calvarium was performed without contrast. FINDINGS: There is no evidence for acute intracranial hemorrhage, midline shift or mass effect. There is patchy hypoattenuation in the periventricular deep white matter bilaterally. There are calcifications within the carotid siphons and vertebrobasilar arterial systems. Mucosal thickening within the paranasal sinuses but no fluid levels are evident. Otherwise the brain parenchyma, CSF spaces, paranasal sinuses and mastoid air cells and surrounding soft tissue and osseous structures have a satisfactory appearance. The gray-white matter junction is otherwise preserved. The cerebellopontine angle and cisterns are unremarkable. The bony calvarium is negative for acute fracture or aggressive process. The carla and brainstem are unremarkable. No evidence for acute intracranial process.  Chronic periventricular

## 2019-09-05 ENCOUNTER — TELEPHONE (OUTPATIENT)
Dept: FAMILY MEDICINE CLINIC | Age: 64
End: 2019-09-05

## 2019-09-11 RX ORDER — PREDNISONE 1 MG/1
5 TABLET ORAL DAILY
Qty: 30 TABLET | Refills: 5 | Status: SHIPPED | OUTPATIENT
Start: 2019-09-11 | End: 2020-02-03 | Stop reason: SDUPTHER

## 2019-09-11 RX ORDER — METOPROLOL SUCCINATE 100 MG/1
100 TABLET, EXTENDED RELEASE ORAL DAILY
Qty: 30 TABLET | Refills: 5 | Status: ON HOLD | OUTPATIENT
Start: 2019-09-11 | End: 2019-11-14 | Stop reason: HOSPADM

## 2019-09-16 ENCOUNTER — OFFICE VISIT (OUTPATIENT)
Dept: FAMILY MEDICINE CLINIC | Age: 64
End: 2019-09-16
Payer: COMMERCIAL

## 2019-09-16 VITALS
WEIGHT: 191 LBS | TEMPERATURE: 98.6 F | BODY MASS INDEX: 37.5 KG/M2 | HEIGHT: 60 IN | DIASTOLIC BLOOD PRESSURE: 65 MMHG | SYSTOLIC BLOOD PRESSURE: 105 MMHG | HEART RATE: 67 BPM | OXYGEN SATURATION: 95 %

## 2019-09-16 DIAGNOSIS — M25.551 BILATERAL HIP PAIN: ICD-10-CM

## 2019-09-16 DIAGNOSIS — J96.11 CHRONIC RESPIRATORY FAILURE WITH HYPOXIA (HCC): Chronic | ICD-10-CM

## 2019-09-16 DIAGNOSIS — D63.8 ANEMIA DUE TO CHRONIC ILLNESS: ICD-10-CM

## 2019-09-16 DIAGNOSIS — I10 BENIGN ESSENTIAL HYPERTENSION: Primary | ICD-10-CM

## 2019-09-16 DIAGNOSIS — R53.81 DEBILITY: ICD-10-CM

## 2019-09-16 DIAGNOSIS — E23.2 DIABETES INSIPIDUS, NEUROHYPOPHYSEAL (HCC): ICD-10-CM

## 2019-09-16 DIAGNOSIS — E03.9 PRIMARY HYPOTHYROIDISM: ICD-10-CM

## 2019-09-16 DIAGNOSIS — I50.42 CHRONIC COMBINED SYSTOLIC AND DIASTOLIC HEART FAILURE (HCC): ICD-10-CM

## 2019-09-16 DIAGNOSIS — F33.41 RECURRENT MAJOR DEPRESSIVE DISORDER, IN PARTIAL REMISSION (HCC): ICD-10-CM

## 2019-09-16 DIAGNOSIS — D86.9 SARCOIDOSIS: ICD-10-CM

## 2019-09-16 DIAGNOSIS — J44.9 COPD, MODERATE (HCC): ICD-10-CM

## 2019-09-16 DIAGNOSIS — N18.30 CHRONIC KIDNEY DISEASE, STAGE III (MODERATE) (HCC): ICD-10-CM

## 2019-09-16 DIAGNOSIS — M25.552 BILATERAL HIP PAIN: ICD-10-CM

## 2019-09-16 DIAGNOSIS — T38.0X5A STEROID-INDUCED AVASCULAR NECROSIS OF RIGHT SHOULDER (HCC): ICD-10-CM

## 2019-09-16 DIAGNOSIS — M87.111 STEROID-INDUCED AVASCULAR NECROSIS OF RIGHT SHOULDER (HCC): ICD-10-CM

## 2019-09-16 DIAGNOSIS — E78.2 MIXED HYPERLIPIDEMIA: ICD-10-CM

## 2019-09-16 DIAGNOSIS — I48.0 PAF (PAROXYSMAL ATRIAL FIBRILLATION) (HCC): ICD-10-CM

## 2019-09-16 PROBLEM — R09.02 HYPOXEMIA: Status: RESOLVED | Noted: 2017-09-21 | Resolved: 2019-09-16

## 2019-09-16 PROCEDURE — 99212 OFFICE O/P EST SF 10 MIN: CPT | Performed by: FAMILY MEDICINE

## 2019-09-16 PROCEDURE — 1111F DSCHRG MED/CURRENT MED MERGE: CPT | Performed by: FAMILY MEDICINE

## 2019-09-16 PROCEDURE — 99495 TRANSJ CARE MGMT MOD F2F 14D: CPT | Performed by: FAMILY MEDICINE

## 2019-09-16 RX ORDER — DESMOPRESSIN ACETATE 0.2 MG/1
TABLET ORAL
Status: ON HOLD | COMMUNITY
Start: 2019-09-06 | End: 2019-11-14 | Stop reason: HOSPADM

## 2019-09-16 NOTE — PROGRESS NOTES
apixaban (ELIQUIS) 5 MG TABS tablet  Take 1 tablet by mouth 2 times daily             BREO ELLIPTA 100-25 MCG/INH AEPB inhaler  Inhale 1 puff into the lungs daily             desmopressin (DDAVP NASAL) 0.01 % solution  2 sprays by Nasal route 2 times daily             desmopressin (DDAVP) 0.2 MG tablet               escitalopram (LEXAPRO) 5 MG tablet  Take 1 tablet by mouth daily             famotidine (PEPCID) 20 MG tablet  Take 1 tablet by mouth 2 times daily             ferrous sulfate 325 (65 Fe) MG tablet  Take 1 tablet by mouth 2 times daily             levothyroxine (SYNTHROID) 75 MCG tablet  Take 1 tablet by mouth daily             Menthol, Topical Analgesic, (BIOFREEZE) 4 % GEL  Apply BID to affected areas             metoprolol succinate (TOPROL XL) 100 MG extended release tablet  Take 1 tablet by mouth daily             metoprolol succinate (TOPROL XL) 50 MG extended release tablet  TAKE ONE TABLET EVERY DAY WITH THE 100MG             Omega 3 1000 MG CAPS  Take 1 each by mouth daily             omeprazole (PRILOSEC) 40 MG delayed release capsule  Take 1 capsule by mouth every morning (before breakfast)             oxyCODONE-acetaminophen (PERCOCET)  MG per tablet  Take 1 tablet by mouth every 6 hours as needed for Pain for up to 30 days. Intended supply: 30 days             OXYGEN  4 L/min by Nasal route as needed.  BMS             potassium chloride (KLOR-CON M) 20 MEQ TBCR extended release tablet  Take 1 tablet by mouth daily (with breakfast)             predniSONE (DELTASONE) 5 MG tablet  Take 1 tablet by mouth daily             Zinc Oxide 10 % OINT  Apply BID to affected area                   Medications marked \"taking\" at this time  Outpatient Medications Marked as Taking for the 9/16/19 encounter (Office Visit) with Jatinder Solano MD   Medication Sig Dispense Refill    desmopressin (DDAVP) 0.2 MG tablet       predniSONE (DELTASONE) 5 MG tablet Take 1 tablet by mouth daily 30 tablet 5   

## 2019-09-26 ENCOUNTER — TELEPHONE (OUTPATIENT)
Dept: FAMILY MEDICINE CLINIC | Age: 64
End: 2019-09-26

## 2019-09-27 ENCOUNTER — TELEPHONE (OUTPATIENT)
Dept: FAMILY MEDICINE CLINIC | Age: 64
End: 2019-09-27

## 2019-09-27 DIAGNOSIS — M17.0 PRIMARY OSTEOARTHRITIS OF BOTH KNEES: ICD-10-CM

## 2019-09-27 RX ORDER — OXYCODONE AND ACETAMINOPHEN 10; 325 MG/1; MG/1
1 TABLET ORAL EVERY 6 HOURS PRN
Qty: 120 TABLET | Refills: 0 | Status: SHIPPED | OUTPATIENT
Start: 2019-09-27 | End: 2019-09-27 | Stop reason: SDUPTHER

## 2019-09-27 RX ORDER — OXYCODONE AND ACETAMINOPHEN 10; 325 MG/1; MG/1
1 TABLET ORAL EVERY 6 HOURS PRN
Qty: 120 TABLET | Refills: 0 | Status: SHIPPED | OUTPATIENT
Start: 2019-09-27 | End: 2019-10-25 | Stop reason: SDUPTHER

## 2019-09-27 RX ORDER — POTASSIUM CHLORIDE 20 MEQ/1
TABLET, EXTENDED RELEASE ORAL
Status: ON HOLD | COMMUNITY
Start: 2019-09-19 | End: 2019-11-11

## 2019-10-11 ENCOUNTER — TELEPHONE (OUTPATIENT)
Dept: FAMILY MEDICINE CLINIC | Age: 64
End: 2019-10-11

## 2019-10-11 RX ORDER — AZITHROMYCIN 250 MG/1
250 TABLET, FILM COATED ORAL SEE ADMIN INSTRUCTIONS
Qty: 6 TABLET | Refills: 0 | Status: SHIPPED | OUTPATIENT
Start: 2019-10-11 | End: 2019-10-16

## 2019-10-17 DIAGNOSIS — J44.9 COPD, MODERATE (HCC): ICD-10-CM

## 2019-10-17 DIAGNOSIS — M17.0 PRIMARY OSTEOARTHRITIS OF BOTH KNEES: ICD-10-CM

## 2019-10-17 RX ORDER — AZITHROMYCIN 250 MG/1
250 TABLET, FILM COATED ORAL SEE ADMIN INSTRUCTIONS
Qty: 6 TABLET | Refills: 0 | Status: CANCELLED | OUTPATIENT
Start: 2019-10-17 | End: 2019-10-22

## 2019-10-18 RX ORDER — ESCITALOPRAM OXALATE 10 MG/1
10 TABLET ORAL DAILY
Qty: 30 TABLET | Refills: 2 | Status: SHIPPED | OUTPATIENT
Start: 2019-10-18 | End: 2019-12-14 | Stop reason: SDUPTHER

## 2019-10-18 RX ORDER — PROMETHAZINE HYDROCHLORIDE AND CODEINE PHOSPHATE 6.25; 1 MG/5ML; MG/5ML
5 SYRUP ORAL 4 TIMES DAILY PRN
Qty: 473 ML | Refills: 2 | Status: SHIPPED | OUTPATIENT
Start: 2019-10-18 | End: 2019-10-28 | Stop reason: SDUPTHER

## 2019-10-25 DIAGNOSIS — M17.0 PRIMARY OSTEOARTHRITIS OF BOTH KNEES: ICD-10-CM

## 2019-10-25 RX ORDER — OXYCODONE AND ACETAMINOPHEN 10; 325 MG/1; MG/1
1 TABLET ORAL EVERY 6 HOURS PRN
Qty: 120 TABLET | Refills: 0 | Status: SHIPPED | OUTPATIENT
Start: 2019-10-25 | End: 2019-11-22 | Stop reason: SDUPTHER

## 2019-10-28 ENCOUNTER — OFFICE VISIT (OUTPATIENT)
Dept: FAMILY MEDICINE CLINIC | Age: 64
End: 2019-10-28
Payer: COMMERCIAL

## 2019-10-28 ENCOUNTER — HOSPITAL ENCOUNTER (OUTPATIENT)
Age: 64
Discharge: HOME | End: 2019-10-30
Payer: COMMERCIAL

## 2019-10-28 VITALS
SYSTOLIC BLOOD PRESSURE: 94 MMHG | RESPIRATION RATE: 16 BRPM | TEMPERATURE: 98.9 F | HEIGHT: 60 IN | OXYGEN SATURATION: 91 % | DIASTOLIC BLOOD PRESSURE: 64 MMHG | WEIGHT: 189 LBS | HEART RATE: 70 BPM | BODY MASS INDEX: 37.11 KG/M2

## 2019-10-28 DIAGNOSIS — N18.30 CHRONIC KIDNEY DISEASE, STAGE III (MODERATE) (HCC): ICD-10-CM

## 2019-10-28 DIAGNOSIS — J96.11 CHRONIC RESPIRATORY FAILURE WITH HYPOXIA (HCC): Chronic | ICD-10-CM

## 2019-10-28 DIAGNOSIS — G89.29 CHRONIC BILATERAL LOW BACK PAIN WITHOUT SCIATICA: ICD-10-CM

## 2019-10-28 DIAGNOSIS — J44.9 COPD, MODERATE (HCC): ICD-10-CM

## 2019-10-28 DIAGNOSIS — F33.41 RECURRENT MAJOR DEPRESSIVE DISORDER, IN PARTIAL REMISSION (HCC): ICD-10-CM

## 2019-10-28 DIAGNOSIS — M54.50 CHRONIC BILATERAL LOW BACK PAIN WITHOUT SCIATICA: ICD-10-CM

## 2019-10-28 DIAGNOSIS — E78.2 MIXED HYPERLIPIDEMIA: ICD-10-CM

## 2019-10-28 DIAGNOSIS — I50.42 CHRONIC COMBINED SYSTOLIC AND DIASTOLIC HEART FAILURE (HCC): ICD-10-CM

## 2019-10-28 DIAGNOSIS — I10 BENIGN ESSENTIAL HYPERTENSION: Primary | ICD-10-CM

## 2019-10-28 DIAGNOSIS — E23.2 DIABETES INSIPIDUS, NEUROHYPOPHYSEAL (HCC): ICD-10-CM

## 2019-10-28 LAB
AMPHETAMINE SCREEN, URINE: NOT DETECTED
BARBITURATE SCREEN URINE: NOT DETECTED
BENZODIAZEPINE SCREEN, URINE: NOT DETECTED
CANNABINOID SCREEN URINE: NOT DETECTED
COCAINE METABOLITE SCREEN URINE: NOT DETECTED
Lab: ABNORMAL
METHADONE SCREEN, URINE: NOT DETECTED
OPIATE SCREEN URINE: POSITIVE
PHENCYCLIDINE SCREEN URINE: NOT DETECTED
PROPOXYPHENE SCREEN: NOT DETECTED

## 2019-10-28 PROCEDURE — G8417 CALC BMI ABV UP PARAM F/U: HCPCS | Performed by: FAMILY MEDICINE

## 2019-10-28 PROCEDURE — 80307 DRUG TEST PRSMV CHEM ANLYZR: CPT

## 2019-10-28 PROCEDURE — 99212 OFFICE O/P EST SF 10 MIN: CPT | Performed by: FAMILY MEDICINE

## 2019-10-28 PROCEDURE — G8926 SPIRO NO PERF OR DOC: HCPCS | Performed by: FAMILY MEDICINE

## 2019-10-28 PROCEDURE — 3017F COLORECTAL CA SCREEN DOC REV: CPT | Performed by: FAMILY MEDICINE

## 2019-10-28 PROCEDURE — 6360000002 HC RX W HCPCS

## 2019-10-28 PROCEDURE — 3023F SPIROM DOC REV: CPT | Performed by: FAMILY MEDICINE

## 2019-10-28 PROCEDURE — G0480 DRUG TEST DEF 1-7 CLASSES: HCPCS

## 2019-10-28 PROCEDURE — 90686 IIV4 VACC NO PRSV 0.5 ML IM: CPT

## 2019-10-28 PROCEDURE — G8482 FLU IMMUNIZE ORDER/ADMIN: HCPCS | Performed by: FAMILY MEDICINE

## 2019-10-28 PROCEDURE — G0008 ADMIN INFLUENZA VIRUS VAC: HCPCS

## 2019-10-28 PROCEDURE — 1036F TOBACCO NON-USER: CPT | Performed by: FAMILY MEDICINE

## 2019-10-28 PROCEDURE — 99213 OFFICE O/P EST LOW 20 MIN: CPT | Performed by: FAMILY MEDICINE

## 2019-10-28 PROCEDURE — G8427 DOCREV CUR MEDS BY ELIG CLIN: HCPCS | Performed by: FAMILY MEDICINE

## 2019-10-28 RX ORDER — PROMETHAZINE HYDROCHLORIDE AND CODEINE PHOSPHATE 6.25; 1 MG/5ML; MG/5ML
10 SYRUP ORAL 4 TIMES DAILY PRN
Qty: 473 ML | Refills: 0 | Status: SHIPPED | OUTPATIENT
Start: 2019-10-28 | End: 2020-01-06 | Stop reason: SDUPTHER

## 2019-10-28 RX ORDER — POLYMYXIN B SULFATE AND TRIMETHOPRIM 1; 10000 MG/ML; [USP'U]/ML
1 SOLUTION OPHTHALMIC EVERY 4 HOURS
Qty: 10 ML | Refills: 0 | Status: SHIPPED | OUTPATIENT
Start: 2019-10-28 | End: 2019-12-14 | Stop reason: SDUPTHER

## 2019-10-28 RX ORDER — DOXYCYCLINE HYCLATE 100 MG
100 TABLET ORAL 2 TIMES DAILY WITH MEALS
Qty: 20 TABLET | Refills: 0 | Status: SHIPPED | OUTPATIENT
Start: 2019-10-28 | End: 2019-11-07

## 2019-10-28 RX ORDER — IPRATROPIUM BROMIDE AND ALBUTEROL SULFATE 2.5; .5 MG/3ML; MG/3ML
1 SOLUTION RESPIRATORY (INHALATION) EVERY 6 HOURS PRN
Qty: 120 VIAL | Refills: 3 | Status: SHIPPED
Start: 2019-10-28 | End: 2020-06-23 | Stop reason: ALTCHOICE

## 2019-10-28 RX ORDER — ALBUTEROL SULFATE 90 UG/1
2 AEROSOL, METERED RESPIRATORY (INHALATION) EVERY 6 HOURS PRN
Qty: 1 INHALER | Refills: 3 | Status: SHIPPED | OUTPATIENT
Start: 2019-10-28 | End: 2020-02-03 | Stop reason: SDUPTHER

## 2019-10-29 RX ORDER — METOPROLOL SUCCINATE 50 MG/1
TABLET, EXTENDED RELEASE ORAL
Qty: 90 TABLET | Refills: 3 | Status: ON HOLD | OUTPATIENT
Start: 2019-10-29 | End: 2019-11-11

## 2019-11-01 LAB
6AM URINE: <10 NG/ML
CODEINE, URINE: 320 NG/ML
HYDROCODONE, URINE: 33 NG/ML
HYDROMORPHONE, URINE: <20 NG/ML
MORPHINE URINE: <20 NG/ML
NORHYDROCODONE, URINE: 34 NG/ML
NOROXYCODONE, URINE: >4000 NG/ML
NOROXYMORPHONE, URINE: 352 NG/ML
OXYCODONE, URINE CONFIRMATION: 3753 NG/ML
OXYMORPHONE, URINE: 40 NG/ML

## 2019-11-05 ENCOUNTER — TELEPHONE (OUTPATIENT)
Dept: FAMILY MEDICINE CLINIC | Age: 64
End: 2019-11-05

## 2019-11-07 RX ORDER — FERROUS SULFATE 325(65) MG
325 TABLET ORAL 2 TIMES DAILY
Qty: 60 TABLET | Refills: 5 | Status: SHIPPED
Start: 2019-11-07 | End: 2020-06-08 | Stop reason: SDUPTHER

## 2019-11-11 ENCOUNTER — HOSPITAL ENCOUNTER (INPATIENT)
Age: 64
LOS: 3 days | Discharge: HOME HEALTH | DRG: 640 | End: 2019-11-14
Attending: EMERGENCY MEDICINE | Admitting: STUDENT IN AN ORGANIZED HEALTH CARE EDUCATION/TRAINING PROGRAM
Payer: COMMERCIAL

## 2019-11-11 ENCOUNTER — APPOINTMENT (OUTPATIENT)
Dept: GENERAL RADIOLOGY | Age: 64
DRG: 640 | End: 2019-11-11
Payer: COMMERCIAL

## 2019-11-11 DIAGNOSIS — R07.9 CHEST PAIN, UNSPECIFIED TYPE: Primary | ICD-10-CM

## 2019-11-11 DIAGNOSIS — E87.1 HYPONATREMIA: ICD-10-CM

## 2019-11-11 DIAGNOSIS — E87.5 HYPERKALEMIA: ICD-10-CM

## 2019-11-11 DIAGNOSIS — J18.9 PNEUMONIA DUE TO ORGANISM: ICD-10-CM

## 2019-11-11 LAB
ANION GAP SERPL CALCULATED.3IONS-SCNC: 10 MMOL/L (ref 7–16)
ANION GAP SERPL CALCULATED.3IONS-SCNC: 14 MMOL/L (ref 7–16)
ANION GAP SERPL CALCULATED.3IONS-SCNC: 4 MMOL/L (ref 7–16)
BASOPHILS ABSOLUTE: 0.02 E9/L (ref 0–0.2)
BASOPHILS RELATIVE PERCENT: 0.4 % (ref 0–2)
BUN BLDV-MCNC: 14 MG/DL (ref 8–23)
BUN BLDV-MCNC: 16 MG/DL (ref 8–23)
BUN BLDV-MCNC: 20 MG/DL (ref 8–23)
CALCIUM SERPL-MCNC: 8.8 MG/DL (ref 8.6–10.2)
CALCIUM SERPL-MCNC: 9.2 MG/DL (ref 8.6–10.2)
CALCIUM SERPL-MCNC: 9.2 MG/DL (ref 8.6–10.2)
CHLORIDE BLD-SCNC: 83 MMOL/L (ref 98–107)
CHLORIDE BLD-SCNC: 83 MMOL/L (ref 98–107)
CHLORIDE BLD-SCNC: 84 MMOL/L (ref 98–107)
CO2: 29 MMOL/L (ref 22–29)
CO2: 31 MMOL/L (ref 22–29)
CO2: 36 MMOL/L (ref 22–29)
CREAT SERPL-MCNC: 0.9 MG/DL (ref 0.5–1)
CREAT SERPL-MCNC: 1.1 MG/DL (ref 0.5–1)
CREAT SERPL-MCNC: 1.2 MG/DL (ref 0.5–1)
EKG ATRIAL RATE: 59 BPM
EKG P AXIS: 61 DEGREES
EKG P-R INTERVAL: 158 MS
EKG Q-T INTERVAL: 400 MS
EKG QRS DURATION: 74 MS
EKG QTC CALCULATION (BAZETT): 396 MS
EKG R AXIS: 55 DEGREES
EKG T AXIS: 55 DEGREES
EKG VENTRICULAR RATE: 59 BPM
EOSINOPHILS ABSOLUTE: 0.1 E9/L (ref 0.05–0.5)
EOSINOPHILS RELATIVE PERCENT: 1.9 % (ref 0–6)
GFR AFRICAN AMERICAN: 55
GFR AFRICAN AMERICAN: >60
GFR AFRICAN AMERICAN: >60
GFR NON-AFRICAN AMERICAN: 55 ML/MIN/1.73
GFR NON-AFRICAN AMERICAN: >60 ML/MIN/1.73
GFR NON-AFRICAN AMERICAN: >60 ML/MIN/1.73
GLUCOSE BLD-MCNC: 158 MG/DL (ref 74–99)
GLUCOSE BLD-MCNC: 89 MG/DL (ref 74–99)
GLUCOSE BLD-MCNC: 96 MG/DL (ref 74–99)
HCT VFR BLD CALC: 31.7 % (ref 34–48)
HEMOGLOBIN: 9.7 G/DL (ref 11.5–15.5)
IMMATURE GRANULOCYTES #: 0.03 E9/L
IMMATURE GRANULOCYTES %: 0.6 % (ref 0–5)
LYMPHOCYTES ABSOLUTE: 0.85 E9/L (ref 1.5–4)
LYMPHOCYTES RELATIVE PERCENT: 15.9 % (ref 20–42)
MCH RBC QN AUTO: 25.9 PG (ref 26–35)
MCHC RBC AUTO-ENTMCNC: 30.6 % (ref 32–34.5)
MCV RBC AUTO: 84.8 FL (ref 80–99.9)
MONOCYTES ABSOLUTE: 0.7 E9/L (ref 0.1–0.95)
MONOCYTES RELATIVE PERCENT: 13.1 % (ref 2–12)
NEUTROPHILS ABSOLUTE: 3.65 E9/L (ref 1.8–7.3)
NEUTROPHILS RELATIVE PERCENT: 68.1 % (ref 43–80)
OSMOLALITY: 268 MOSM/KG (ref 285–310)
PDW BLD-RTO: 13.5 FL (ref 11.5–15)
PLATELET # BLD: 214 E9/L (ref 130–450)
PMV BLD AUTO: 9.2 FL (ref 7–12)
POTASSIUM SERPL-SCNC: 4.3 MMOL/L (ref 3.5–5)
POTASSIUM SERPL-SCNC: 5.4 MMOL/L (ref 3.5–5)
POTASSIUM SERPL-SCNC: 6.3 MMOL/L (ref 3.5–5)
PRO-BNP: 163 PG/ML (ref 0–125)
RBC # BLD: 3.74 E12/L (ref 3.5–5.5)
SODIUM BLD-SCNC: 123 MMOL/L (ref 132–146)
SODIUM BLD-SCNC: 125 MMOL/L (ref 132–146)
SODIUM BLD-SCNC: 126 MMOL/L (ref 132–146)
TROPONIN: <0.01 NG/ML (ref 0–0.03)
WBC # BLD: 5.4 E9/L (ref 4.5–11.5)

## 2019-11-11 PROCEDURE — 2580000003 HC RX 258: Performed by: STUDENT IN AN ORGANIZED HEALTH CARE EDUCATION/TRAINING PROGRAM

## 2019-11-11 PROCEDURE — 2580000003 HC RX 258: Performed by: EMERGENCY MEDICINE

## 2019-11-11 PROCEDURE — 71045 X-RAY EXAM CHEST 1 VIEW: CPT

## 2019-11-11 PROCEDURE — 83930 ASSAY OF BLOOD OSMOLALITY: CPT

## 2019-11-11 PROCEDURE — 6370000000 HC RX 637 (ALT 250 FOR IP): Performed by: STUDENT IN AN ORGANIZED HEALTH CARE EDUCATION/TRAINING PROGRAM

## 2019-11-11 PROCEDURE — 6360000002 HC RX W HCPCS: Performed by: EMERGENCY MEDICINE

## 2019-11-11 PROCEDURE — 99285 EMERGENCY DEPT VISIT HI MDM: CPT

## 2019-11-11 PROCEDURE — 80048 BASIC METABOLIC PNL TOTAL CA: CPT

## 2019-11-11 PROCEDURE — 99222 1ST HOSP IP/OBS MODERATE 55: CPT | Performed by: FAMILY MEDICINE

## 2019-11-11 PROCEDURE — 6370000000 HC RX 637 (ALT 250 FOR IP): Performed by: EMERGENCY MEDICINE

## 2019-11-11 PROCEDURE — 83880 ASSAY OF NATRIURETIC PEPTIDE: CPT

## 2019-11-11 PROCEDURE — 2500000003 HC RX 250 WO HCPCS

## 2019-11-11 PROCEDURE — 84484 ASSAY OF TROPONIN QUANT: CPT

## 2019-11-11 PROCEDURE — 87040 BLOOD CULTURE FOR BACTERIA: CPT

## 2019-11-11 PROCEDURE — 93005 ELECTROCARDIOGRAM TRACING: CPT | Performed by: EMERGENCY MEDICINE

## 2019-11-11 PROCEDURE — 94640 AIRWAY INHALATION TREATMENT: CPT

## 2019-11-11 PROCEDURE — 2140000000 HC CCU INTERMEDIATE R&B

## 2019-11-11 PROCEDURE — 36415 COLL VENOUS BLD VENIPUNCTURE: CPT

## 2019-11-11 PROCEDURE — 2500000003 HC RX 250 WO HCPCS: Performed by: EMERGENCY MEDICINE

## 2019-11-11 PROCEDURE — 85025 COMPLETE CBC W/AUTO DIFF WBC: CPT

## 2019-11-11 RX ORDER — PREDNISONE 1 MG/1
5 TABLET ORAL DAILY
Status: DISCONTINUED | OUTPATIENT
Start: 2019-11-11 | End: 2019-11-14 | Stop reason: HOSPADM

## 2019-11-11 RX ORDER — IPRATROPIUM BROMIDE AND ALBUTEROL SULFATE 2.5; .5 MG/3ML; MG/3ML
1 SOLUTION RESPIRATORY (INHALATION)
Status: DISPENSED | OUTPATIENT
Start: 2019-11-11 | End: 2019-11-11

## 2019-11-11 RX ORDER — SODIUM CHLORIDE 0.9 % (FLUSH) 0.9 %
10 SYRINGE (ML) INJECTION EVERY 12 HOURS SCHEDULED
Status: DISCONTINUED | OUTPATIENT
Start: 2019-11-11 | End: 2019-11-14 | Stop reason: HOSPADM

## 2019-11-11 RX ORDER — DESMOPRESSIN ACETATE 0.1 MG/1
200 TABLET ORAL 2 TIMES DAILY
Status: DISCONTINUED | OUTPATIENT
Start: 2019-11-11 | End: 2019-11-11

## 2019-11-11 RX ORDER — DEXTROSE MONOHYDRATE 50 MG/ML
100 INJECTION, SOLUTION INTRAVENOUS PRN
Status: DISCONTINUED | OUTPATIENT
Start: 2019-11-11 | End: 2019-11-11

## 2019-11-11 RX ORDER — SODIUM POLYSTYRENE SULFONATE 15 G/60ML
15 SUSPENSION ORAL; RECTAL ONCE
Status: DISCONTINUED | OUTPATIENT
Start: 2019-11-11 | End: 2019-11-11 | Stop reason: CLARIF

## 2019-11-11 RX ORDER — DESMOPRESSIN ACETATE 0.1 MG/ML
2 SOLUTION NASAL 2 TIMES DAILY
Status: DISCONTINUED | OUTPATIENT
Start: 2019-11-11 | End: 2019-11-12

## 2019-11-11 RX ORDER — FAMOTIDINE 20 MG/1
20 TABLET, FILM COATED ORAL 2 TIMES DAILY
Status: DISCONTINUED | OUTPATIENT
Start: 2019-11-11 | End: 2019-11-14 | Stop reason: HOSPADM

## 2019-11-11 RX ORDER — ACETAMINOPHEN 325 MG/1
650 TABLET ORAL EVERY 4 HOURS PRN
Status: DISCONTINUED | OUTPATIENT
Start: 2019-11-11 | End: 2019-11-14 | Stop reason: HOSPADM

## 2019-11-11 RX ORDER — SODIUM POLYSTYRENE SULFONATE 4.1 MEQ/G
15 POWDER, FOR SUSPENSION ORAL; RECTAL ONCE
Status: COMPLETED | OUTPATIENT
Start: 2019-11-11 | End: 2019-11-11

## 2019-11-11 RX ORDER — OXYCODONE AND ACETAMINOPHEN 10; 325 MG/1; MG/1
1 TABLET ORAL EVERY 6 HOURS PRN
Status: DISCONTINUED | OUTPATIENT
Start: 2019-11-11 | End: 2019-11-14 | Stop reason: HOSPADM

## 2019-11-11 RX ORDER — SODIUM CHLORIDE 9 MG/ML
INJECTION, SOLUTION INTRAVENOUS CONTINUOUS
Status: DISCONTINUED | OUTPATIENT
Start: 2019-11-11 | End: 2019-11-11

## 2019-11-11 RX ORDER — SODIUM CHLORIDE 0.9 % (FLUSH) 0.9 %
10 SYRINGE (ML) INJECTION PRN
Status: DISCONTINUED | OUTPATIENT
Start: 2019-11-11 | End: 2019-11-14 | Stop reason: HOSPADM

## 2019-11-11 RX ORDER — LEVOTHYROXINE SODIUM 0.03 MG/1
75 TABLET ORAL DAILY
Status: DISCONTINUED | OUTPATIENT
Start: 2019-11-11 | End: 2019-11-12

## 2019-11-11 RX ORDER — CALCIUM GLUCONATE 94 MG/ML
1 INJECTION, SOLUTION INTRAVENOUS ONCE
Status: DISCONTINUED | OUTPATIENT
Start: 2019-11-11 | End: 2019-11-14 | Stop reason: HOSPADM

## 2019-11-11 RX ORDER — DEXTROSE MONOHYDRATE 25 G/50ML
25 INJECTION, SOLUTION INTRAVENOUS ONCE
Status: COMPLETED | OUTPATIENT
Start: 2019-11-11 | End: 2019-11-11

## 2019-11-11 RX ORDER — DESMOPRESSIN ACETATE 0.1 MG/1
400 TABLET ORAL 2 TIMES DAILY
Status: DISCONTINUED | OUTPATIENT
Start: 2019-11-11 | End: 2019-11-11

## 2019-11-11 RX ORDER — DESMOPRESSIN ACETATE 0.1 MG/1
200 TABLET ORAL NIGHTLY
Status: DISCONTINUED | OUTPATIENT
Start: 2019-11-11 | End: 2019-11-12

## 2019-11-11 RX ORDER — METOPROLOL SUCCINATE 100 MG/1
150 TABLET, EXTENDED RELEASE ORAL DAILY
Status: DISCONTINUED | OUTPATIENT
Start: 2019-11-11 | End: 2019-11-14 | Stop reason: HOSPADM

## 2019-11-11 RX ORDER — IPRATROPIUM BROMIDE AND ALBUTEROL SULFATE 2.5; .5 MG/3ML; MG/3ML
1 SOLUTION RESPIRATORY (INHALATION) EVERY 6 HOURS PRN
Status: DISCONTINUED | OUTPATIENT
Start: 2019-11-11 | End: 2019-11-14 | Stop reason: HOSPADM

## 2019-11-11 RX ORDER — DEXTROSE MONOHYDRATE 25 G/50ML
12.5 INJECTION, SOLUTION INTRAVENOUS PRN
Status: DISCONTINUED | OUTPATIENT
Start: 2019-11-11 | End: 2019-11-14 | Stop reason: HOSPADM

## 2019-11-11 RX ORDER — BENZONATATE 100 MG/1
200 CAPSULE ORAL 3 TIMES DAILY PRN
Status: DISCONTINUED | OUTPATIENT
Start: 2019-11-11 | End: 2019-11-14 | Stop reason: HOSPADM

## 2019-11-11 RX ORDER — ESCITALOPRAM OXALATE 10 MG/1
10 TABLET ORAL DAILY
Status: DISCONTINUED | OUTPATIENT
Start: 2019-11-11 | End: 2019-11-14 | Stop reason: HOSPADM

## 2019-11-11 RX ORDER — NICOTINE POLACRILEX 4 MG
15 LOZENGE BUCCAL PRN
Status: DISCONTINUED | OUTPATIENT
Start: 2019-11-11 | End: 2019-11-11

## 2019-11-11 RX ORDER — FERROUS SULFATE 325(65) MG
325 TABLET ORAL 2 TIMES DAILY
Status: DISCONTINUED | OUTPATIENT
Start: 2019-11-11 | End: 2019-11-14 | Stop reason: HOSPADM

## 2019-11-11 RX ORDER — AMLODIPINE BESYLATE 10 MG/1
10 TABLET ORAL DAILY
Status: DISCONTINUED | OUTPATIENT
Start: 2019-11-11 | End: 2019-11-14 | Stop reason: HOSPADM

## 2019-11-11 RX ADMIN — SODIUM CHLORIDE: 9 INJECTION, SOLUTION INTRAVENOUS at 05:06

## 2019-11-11 RX ADMIN — OXYCODONE HYDROCHLORIDE AND ACETAMINOPHEN 1 TABLET: 10; 325 TABLET ORAL at 13:12

## 2019-11-11 RX ADMIN — Medication 1 G: at 05:04

## 2019-11-11 RX ADMIN — IPRATROPIUM BROMIDE AND ALBUTEROL SULFATE 1 AMPULE: .5; 3 SOLUTION RESPIRATORY (INHALATION) at 03:53

## 2019-11-11 RX ADMIN — FERROUS SULFATE TAB 325 MG (65 MG ELEMENTAL FE) 325 MG: 325 (65 FE) TAB at 11:28

## 2019-11-11 RX ADMIN — APIXABAN 5 MG: 5 TABLET, FILM COATED ORAL at 20:29

## 2019-11-11 RX ADMIN — Medication 10 ML: at 11:30

## 2019-11-11 RX ADMIN — Medication 50 MEQ: at 05:05

## 2019-11-11 RX ADMIN — MOMETASONE FUROATE AND FORMOTEROL FUMARATE DIHYDRATE 2 PUFF: 100; 5 AEROSOL RESPIRATORY (INHALATION) at 20:33

## 2019-11-11 RX ADMIN — FAMOTIDINE 20 MG: 20 TABLET, FILM COATED ORAL at 20:30

## 2019-11-11 RX ADMIN — SODIUM POLYSTYRENE SULFONATE 15 G: 1 POWDER ORAL; RECTAL at 05:53

## 2019-11-11 RX ADMIN — FERROUS SULFATE TAB 325 MG (65 MG ELEMENTAL FE) 325 MG: 325 (65 FE) TAB at 20:30

## 2019-11-11 RX ADMIN — Medication 10 ML: at 20:30

## 2019-11-11 RX ADMIN — DEXTROSE 50 % IN WATER (D50W) INTRAVENOUS SYRINGE 25 G: at 05:05

## 2019-11-11 RX ADMIN — DOXYCYCLINE 100 MG: 100 INJECTION, POWDER, LYOPHILIZED, FOR SOLUTION INTRAVENOUS at 06:36

## 2019-11-11 RX ADMIN — INSULIN HUMAN 10 UNITS: 100 INJECTION, SOLUTION PARENTERAL at 05:09

## 2019-11-11 RX ADMIN — FAMOTIDINE 20 MG: 20 TABLET, FILM COATED ORAL at 11:28

## 2019-11-11 RX ADMIN — APIXABAN 5 MG: 5 TABLET, FILM COATED ORAL at 11:28

## 2019-11-11 RX ADMIN — LEVOTHYROXINE SODIUM 75 MCG: 25 TABLET ORAL at 11:28

## 2019-11-11 RX ADMIN — PREDNISONE 5 MG: 5 TABLET ORAL at 11:29

## 2019-11-11 RX ADMIN — CEFEPIME HYDROCHLORIDE 2 G: 2 INJECTION, POWDER, FOR SOLUTION INTRAVENOUS at 05:35

## 2019-11-11 RX ADMIN — IPRATROPIUM BROMIDE AND ALBUTEROL SULFATE 1 AMPULE: .5; 3 SOLUTION RESPIRATORY (INHALATION) at 03:52

## 2019-11-11 RX ADMIN — OXYCODONE HYDROCHLORIDE AND ACETAMINOPHEN 1 TABLET: 10; 325 TABLET ORAL at 20:30

## 2019-11-11 RX ADMIN — AMLODIPINE BESYLATE 10 MG: 10 TABLET ORAL at 11:38

## 2019-11-11 ASSESSMENT — PAIN DESCRIPTION - PROGRESSION: CLINICAL_PROGRESSION: NOT CHANGED

## 2019-11-11 ASSESSMENT — PAIN SCALES - GENERAL
PAINLEVEL_OUTOF10: 8
PAINLEVEL_OUTOF10: 8
PAINLEVEL_OUTOF10: 9
PAINLEVEL_OUTOF10: 9

## 2019-11-11 ASSESSMENT — ENCOUNTER SYMPTOMS
NAUSEA: 0
ABDOMINAL PAIN: 0
DIARRHEA: 0
ABDOMINAL DISTENTION: 0
CONSTIPATION: 0
BLOOD IN STOOL: 0
VOMITING: 0
BACK PAIN: 0
EYE REDNESS: 0
COUGH: 1
SORE THROAT: 0
RHINORRHEA: 0
WHEEZING: 0
EYE PAIN: 0
SINUS PRESSURE: 0
SHORTNESS OF BREATH: 0
EYE DISCHARGE: 0

## 2019-11-11 ASSESSMENT — PAIN DESCRIPTION - LOCATION: LOCATION: CHEST

## 2019-11-11 ASSESSMENT — PAIN DESCRIPTION - ONSET: ONSET: SUDDEN

## 2019-11-11 ASSESSMENT — PAIN DESCRIPTION - ORIENTATION: ORIENTATION: LEFT

## 2019-11-11 ASSESSMENT — PAIN DESCRIPTION - FREQUENCY: FREQUENCY: CONTINUOUS

## 2019-11-11 ASSESSMENT — PAIN DESCRIPTION - PAIN TYPE: TYPE: ACUTE PAIN

## 2019-11-11 ASSESSMENT — PAIN DESCRIPTION - DESCRIPTORS: DESCRIPTORS: THROBBING

## 2019-11-12 ENCOUNTER — TELEPHONE (OUTPATIENT)
Dept: FAMILY MEDICINE CLINIC | Age: 64
End: 2019-11-12

## 2019-11-12 LAB
ADENOVIRUS BY PCR: NOT DETECTED
ALBUMIN SERPL-MCNC: 3.7 G/DL (ref 3.5–5.2)
ALP BLD-CCNC: 89 U/L (ref 35–104)
ALT SERPL-CCNC: 10 U/L (ref 0–32)
ANION GAP SERPL CALCULATED.3IONS-SCNC: 11 MMOL/L (ref 7–16)
ANION GAP SERPL CALCULATED.3IONS-SCNC: 13 MMOL/L (ref 7–16)
AST SERPL-CCNC: 17 U/L (ref 0–31)
BASOPHILS ABSOLUTE: 0.02 E9/L (ref 0–0.2)
BASOPHILS RELATIVE PERCENT: 0.5 % (ref 0–2)
BILIRUB SERPL-MCNC: 0.2 MG/DL (ref 0–1.2)
BORDETELLA PARAPERTUSSIS BY PCR: NOT DETECTED
BORDETELLA PERTUSSIS BY PCR: NOT DETECTED
BUN BLDV-MCNC: 18 MG/DL (ref 8–23)
BUN BLDV-MCNC: 20 MG/DL (ref 8–23)
CALCIUM SERPL-MCNC: 9 MG/DL (ref 8.6–10.2)
CALCIUM SERPL-MCNC: 9 MG/DL (ref 8.6–10.2)
CHLAMYDOPHILIA PNEUMONIAE BY PCR: NOT DETECTED
CHLORIDE BLD-SCNC: 91 MMOL/L (ref 98–107)
CHLORIDE BLD-SCNC: 91 MMOL/L (ref 98–107)
CHLORIDE URINE RANDOM: 26 MMOL/L
CO2: 31 MMOL/L (ref 22–29)
CO2: 32 MMOL/L (ref 22–29)
CORONAVIRUS 229E BY PCR: NOT DETECTED
CORONAVIRUS HKU1 BY PCR: NOT DETECTED
CORONAVIRUS NL63 BY PCR: NOT DETECTED
CORONAVIRUS OC43 BY PCR: NOT DETECTED
CORTISOL TOTAL: 1.35 MCG/DL (ref 2.68–18.4)
CREAT SERPL-MCNC: 1 MG/DL (ref 0.5–1)
CREAT SERPL-MCNC: 1 MG/DL (ref 0.5–1)
EOSINOPHILS ABSOLUTE: 0.24 E9/L (ref 0.05–0.5)
EOSINOPHILS RELATIVE PERCENT: 6.3 % (ref 0–6)
GFR AFRICAN AMERICAN: >60
GFR AFRICAN AMERICAN: >60
GFR NON-AFRICAN AMERICAN: >60 ML/MIN/1.73
GFR NON-AFRICAN AMERICAN: >60 ML/MIN/1.73
GLUCOSE BLD-MCNC: 113 MG/DL (ref 74–99)
GLUCOSE BLD-MCNC: 83 MG/DL (ref 74–99)
HCT VFR BLD CALC: 31.9 % (ref 34–48)
HEMOGLOBIN: 9.7 G/DL (ref 11.5–15.5)
HUMAN METAPNEUMOVIRUS BY PCR: NOT DETECTED
HUMAN RHINOVIRUS/ENTEROVIRUS BY PCR: NOT DETECTED
IMMATURE GRANULOCYTES #: 0.01 E9/L
IMMATURE GRANULOCYTES %: 0.3 % (ref 0–5)
INFLUENZA A BY PCR: NOT DETECTED
INFLUENZA B BY PCR: NOT DETECTED
IRON SATURATION: 24 % (ref 15–50)
IRON: 58 MCG/DL (ref 37–145)
LYMPHOCYTES ABSOLUTE: 0.78 E9/L (ref 1.5–4)
LYMPHOCYTES RELATIVE PERCENT: 20.5 % (ref 20–42)
MCH RBC QN AUTO: 25.5 PG (ref 26–35)
MCHC RBC AUTO-ENTMCNC: 30.4 % (ref 32–34.5)
MCV RBC AUTO: 83.7 FL (ref 80–99.9)
MONOCYTES ABSOLUTE: 0.58 E9/L (ref 0.1–0.95)
MONOCYTES RELATIVE PERCENT: 15.3 % (ref 2–12)
MYCOPLASMA PNEUMONIAE BY PCR: NOT DETECTED
NEUTROPHILS ABSOLUTE: 2.17 E9/L (ref 1.8–7.3)
NEUTROPHILS RELATIVE PERCENT: 57.1 % (ref 43–80)
OSMOLALITY URINE: 169 MOSM/KG (ref 300–900)
PARAINFLUENZA VIRUS 1 BY PCR: NOT DETECTED
PARAINFLUENZA VIRUS 2 BY PCR: NOT DETECTED
PARAINFLUENZA VIRUS 3 BY PCR: NOT DETECTED
PARAINFLUENZA VIRUS 4 BY PCR: NOT DETECTED
PDW BLD-RTO: 13.7 FL (ref 11.5–15)
PLATELET # BLD: 198 E9/L (ref 130–450)
PMV BLD AUTO: 9.4 FL (ref 7–12)
POTASSIUM REFLEX MAGNESIUM: 4.4 MMOL/L (ref 3.5–5)
POTASSIUM SERPL-SCNC: 4.5 MMOL/L (ref 3.5–5)
POTASSIUM, UR: 25.6 MMOL/L
PROCALCITONIN: 0.06 NG/ML (ref 0–0.08)
PROCALCITONIN: 0.07 NG/ML (ref 0–0.08)
RBC # BLD: 3.81 E12/L (ref 3.5–5.5)
RESPIRATORY SYNCYTIAL VIRUS BY PCR: NOT DETECTED
SODIUM BLD-SCNC: 132 MMOL/L (ref 132–146)
SODIUM BLD-SCNC: 132 MMOL/L (ref 132–146)
SODIUM BLD-SCNC: 133 MMOL/L (ref 132–146)
SODIUM BLD-SCNC: 133 MMOL/L (ref 132–146)
SODIUM BLD-SCNC: 135 MMOL/L (ref 132–146)
SODIUM BLD-SCNC: 135 MMOL/L (ref 132–146)
SODIUM BLD-SCNC: 136 MMOL/L (ref 132–146)
SODIUM URINE: 24 MMOL/L
T4 FREE: 1.17 NG/DL (ref 0.93–1.7)
TOTAL IRON BINDING CAPACITY: 246 MCG/DL (ref 250–450)
TOTAL PROTEIN: 6.6 G/DL (ref 6.4–8.3)
TSH SERPL DL<=0.05 MIU/L-ACNC: 0.11 UIU/ML (ref 0.27–4.2)
WBC # BLD: 3.8 E9/L (ref 4.5–11.5)

## 2019-11-12 PROCEDURE — 6370000000 HC RX 637 (ALT 250 FOR IP): Performed by: INTERNAL MEDICINE

## 2019-11-12 PROCEDURE — 36415 COLL VENOUS BLD VENIPUNCTURE: CPT

## 2019-11-12 PROCEDURE — 51702 INSERT TEMP BLADDER CATH: CPT

## 2019-11-12 PROCEDURE — 2580000003 HC RX 258: Performed by: INTERNAL MEDICINE

## 2019-11-12 PROCEDURE — 84145 PROCALCITONIN (PCT): CPT

## 2019-11-12 PROCEDURE — 82533 TOTAL CORTISOL: CPT

## 2019-11-12 PROCEDURE — 85025 COMPLETE CBC W/AUTO DIFF WBC: CPT

## 2019-11-12 PROCEDURE — 87081 CULTURE SCREEN ONLY: CPT

## 2019-11-12 PROCEDURE — 97530 THERAPEUTIC ACTIVITIES: CPT

## 2019-11-12 PROCEDURE — 97535 SELF CARE MNGMENT TRAINING: CPT

## 2019-11-12 PROCEDURE — 2580000003 HC RX 258: Performed by: STUDENT IN AN ORGANIZED HEALTH CARE EDUCATION/TRAINING PROGRAM

## 2019-11-12 PROCEDURE — 99232 SBSQ HOSP IP/OBS MODERATE 35: CPT | Performed by: FAMILY MEDICINE

## 2019-11-12 PROCEDURE — 83935 ASSAY OF URINE OSMOLALITY: CPT

## 2019-11-12 PROCEDURE — 6370000000 HC RX 637 (ALT 250 FOR IP): Performed by: STUDENT IN AN ORGANIZED HEALTH CARE EDUCATION/TRAINING PROGRAM

## 2019-11-12 PROCEDURE — 1200000000 HC SEMI PRIVATE

## 2019-11-12 PROCEDURE — 97166 OT EVAL MOD COMPLEX 45 MIN: CPT

## 2019-11-12 PROCEDURE — 84300 ASSAY OF URINE SODIUM: CPT

## 2019-11-12 PROCEDURE — 83540 ASSAY OF IRON: CPT

## 2019-11-12 PROCEDURE — 2700000000 HC OXYGEN THERAPY PER DAY

## 2019-11-12 PROCEDURE — 82436 ASSAY OF URINE CHLORIDE: CPT

## 2019-11-12 PROCEDURE — 6360000002 HC RX W HCPCS: Performed by: INTERNAL MEDICINE

## 2019-11-12 PROCEDURE — 84443 ASSAY THYROID STIM HORMONE: CPT

## 2019-11-12 PROCEDURE — 84133 ASSAY OF URINE POTASSIUM: CPT

## 2019-11-12 PROCEDURE — 97162 PT EVAL MOD COMPLEX 30 MIN: CPT

## 2019-11-12 PROCEDURE — 83550 IRON BINDING TEST: CPT

## 2019-11-12 PROCEDURE — 80053 COMPREHEN METABOLIC PANEL: CPT

## 2019-11-12 PROCEDURE — 84439 ASSAY OF FREE THYROXINE: CPT

## 2019-11-12 PROCEDURE — 0100U HC RESPIRPTHGN MULT REV TRANS & AMP PRB TECH 21 TRGT: CPT

## 2019-11-12 PROCEDURE — 80048 BASIC METABOLIC PNL TOTAL CA: CPT

## 2019-11-12 PROCEDURE — 84295 ASSAY OF SERUM SODIUM: CPT

## 2019-11-12 RX ORDER — DEXTROSE MONOHYDRATE 50 MG/ML
INJECTION, SOLUTION INTRAVENOUS CONTINUOUS
Status: DISCONTINUED | OUTPATIENT
Start: 2019-11-12 | End: 2019-11-12

## 2019-11-12 RX ORDER — PROMETHAZINE HYDROCHLORIDE AND CODEINE PHOSPHATE 6.25; 1 MG/5ML; MG/5ML
10 SYRUP ORAL 4 TIMES DAILY PRN
Status: DISCONTINUED | OUTPATIENT
Start: 2019-11-12 | End: 2019-11-12

## 2019-11-12 RX ORDER — LEVOTHYROXINE SODIUM 0.05 MG/1
50 TABLET ORAL DAILY
Status: DISCONTINUED | OUTPATIENT
Start: 2019-11-13 | End: 2019-11-13

## 2019-11-12 RX ORDER — PROMETHAZINE HYDROCHLORIDE AND CODEINE PHOSPHATE 6.25; 1 MG/5ML; MG/5ML
10 SOLUTION ORAL EVERY 6 HOURS PRN
Status: DISCONTINUED | OUTPATIENT
Start: 2019-11-12 | End: 2019-11-14 | Stop reason: HOSPADM

## 2019-11-12 RX ORDER — DESMOPRESSIN ACETATE 4 UG/ML
2 INJECTION, SOLUTION INTRAVENOUS; SUBCUTANEOUS ONCE
Status: COMPLETED | OUTPATIENT
Start: 2019-11-12 | End: 2019-11-12

## 2019-11-12 RX ORDER — DESMOPRESSIN ACETATE 0.1 MG/1
200 TABLET ORAL 2 TIMES DAILY
Status: DISCONTINUED | OUTPATIENT
Start: 2019-11-12 | End: 2019-11-14 | Stop reason: HOSPADM

## 2019-11-12 RX ORDER — PROMETHAZINE HYDROCHLORIDE AND CODEINE PHOSPHATE 6.25; 1 MG/5ML; MG/5ML
10 SOLUTION ORAL EVERY 6 HOURS PRN
Status: DISCONTINUED | OUTPATIENT
Start: 2019-11-12 | End: 2019-11-12 | Stop reason: CLARIF

## 2019-11-12 RX ORDER — GUAIFENESIN/DEXTROMETHORPHAN 100-10MG/5
5 SYRUP ORAL EVERY 6 HOURS PRN
Status: DISCONTINUED | OUTPATIENT
Start: 2019-11-12 | End: 2019-11-12

## 2019-11-12 RX ADMIN — DEXTROSE MONOHYDRATE: 50 INJECTION, SOLUTION INTRAVENOUS at 06:49

## 2019-11-12 RX ADMIN — DESMOPRESSIN ACETATE 2 MCG: 4 SOLUTION INTRAVENOUS at 04:21

## 2019-11-12 RX ADMIN — LEVOTHYROXINE SODIUM 75 MCG: 25 TABLET ORAL at 09:16

## 2019-11-12 RX ADMIN — MOMETASONE FUROATE AND FORMOTEROL FUMARATE DIHYDRATE 2 PUFF: 100; 5 AEROSOL RESPIRATORY (INHALATION) at 09:54

## 2019-11-12 RX ADMIN — FAMOTIDINE 20 MG: 20 TABLET, FILM COATED ORAL at 09:16

## 2019-11-12 RX ADMIN — Medication 10 ML: at 09:19

## 2019-11-12 RX ADMIN — OXYCODONE HYDROCHLORIDE AND ACETAMINOPHEN 1 TABLET: 10; 325 TABLET ORAL at 16:29

## 2019-11-12 RX ADMIN — FAMOTIDINE 20 MG: 20 TABLET, FILM COATED ORAL at 21:02

## 2019-11-12 RX ADMIN — Medication 10 ML: at 21:03

## 2019-11-12 RX ADMIN — DESMOPRESSIN ACETATE 200 MCG: 0.1 TABLET ORAL at 21:01

## 2019-11-12 RX ADMIN — DESMOPRESSIN ACETATE 200 MCG: 0.1 TABLET ORAL at 09:19

## 2019-11-12 RX ADMIN — AMLODIPINE BESYLATE 10 MG: 10 TABLET ORAL at 09:18

## 2019-11-12 RX ADMIN — OXYCODONE HYDROCHLORIDE AND ACETAMINOPHEN 1 TABLET: 10; 325 TABLET ORAL at 06:18

## 2019-11-12 RX ADMIN — FERROUS SULFATE TAB 325 MG (65 MG ELEMENTAL FE) 325 MG: 325 (65 FE) TAB at 19:51

## 2019-11-12 RX ADMIN — MOMETASONE FUROATE AND FORMOTEROL FUMARATE DIHYDRATE 2 PUFF: 100; 5 AEROSOL RESPIRATORY (INHALATION) at 22:22

## 2019-11-12 RX ADMIN — BENZONATATE 200 MG: 100 CAPSULE ORAL at 19:51

## 2019-11-12 RX ADMIN — PREDNISONE 5 MG: 5 TABLET ORAL at 09:16

## 2019-11-12 RX ADMIN — DESMOPRESSIN ACETATE 2 MCG: 4 SOLUTION INTRAVENOUS at 09:36

## 2019-11-12 RX ADMIN — DEXTROSE MONOHYDRATE 250 ML: 50 INJECTION, SOLUTION INTRAVENOUS at 09:53

## 2019-11-12 RX ADMIN — ESCITALOPRAM 10 MG: 10 TABLET, FILM COATED ORAL at 09:16

## 2019-11-12 RX ADMIN — APIXABAN 5 MG: 5 TABLET, FILM COATED ORAL at 21:02

## 2019-11-12 RX ADMIN — APIXABAN 5 MG: 5 TABLET, FILM COATED ORAL at 09:16

## 2019-11-12 RX ADMIN — PROMETHAZINE HYDROCHLORIDE AND CODEINE PHOSPHATE 10 ML: 6.25; 1 SOLUTION ORAL at 22:23

## 2019-11-12 RX ADMIN — DEXTROSE MONOHYDRATE: 50 INJECTION, SOLUTION INTRAVENOUS at 09:53

## 2019-11-12 RX ADMIN — OXYCODONE HYDROCHLORIDE AND ACETAMINOPHEN 1 TABLET: 10; 325 TABLET ORAL at 22:29

## 2019-11-12 RX ADMIN — FERROUS SULFATE TAB 325 MG (65 MG ELEMENTAL FE) 325 MG: 325 (65 FE) TAB at 09:16

## 2019-11-12 ASSESSMENT — PAIN DESCRIPTION - DESCRIPTORS
DESCRIPTORS: CONSTANT;SORE;DISCOMFORT
DESCRIPTORS: ACHING;DISCOMFORT

## 2019-11-12 ASSESSMENT — PAIN DESCRIPTION - PAIN TYPE
TYPE: ACUTE PAIN
TYPE: ACUTE PAIN

## 2019-11-12 ASSESSMENT — PAIN SCALES - GENERAL
PAINLEVEL_OUTOF10: 0
PAINLEVEL_OUTOF10: 9
PAINLEVEL_OUTOF10: 10
PAINLEVEL_OUTOF10: 0
PAINLEVEL_OUTOF10: 3
PAINLEVEL_OUTOF10: 0
PAINLEVEL_OUTOF10: 10
PAINLEVEL_OUTOF10: 0

## 2019-11-12 ASSESSMENT — PAIN DESCRIPTION - FREQUENCY: FREQUENCY: CONTINUOUS

## 2019-11-12 ASSESSMENT — PAIN DESCRIPTION - LOCATION
LOCATION: NECK
LOCATION: NECK

## 2019-11-13 LAB
ANION GAP SERPL CALCULATED.3IONS-SCNC: 1 MMOL/L (ref 7–16)
ANION GAP SERPL CALCULATED.3IONS-SCNC: 13 MMOL/L (ref 7–16)
BUN BLDV-MCNC: 20 MG/DL (ref 8–23)
BUN BLDV-MCNC: 20 MG/DL (ref 8–23)
CALCIUM SERPL-MCNC: 8.8 MG/DL (ref 8.6–10.2)
CALCIUM SERPL-MCNC: 9.3 MG/DL (ref 8.6–10.2)
CHLORIDE BLD-SCNC: 89 MMOL/L (ref 98–107)
CHLORIDE BLD-SCNC: 90 MMOL/L (ref 98–107)
CO2: 31 MMOL/L (ref 22–29)
CO2: 40 MMOL/L (ref 22–29)
CREAT SERPL-MCNC: 0.9 MG/DL (ref 0.5–1)
CREAT SERPL-MCNC: 0.9 MG/DL (ref 0.5–1)
GFR AFRICAN AMERICAN: >60
GFR AFRICAN AMERICAN: >60
GFR NON-AFRICAN AMERICAN: >60 ML/MIN/1.73
GFR NON-AFRICAN AMERICAN: >60 ML/MIN/1.73
GLUCOSE BLD-MCNC: 104 MG/DL (ref 74–99)
GLUCOSE BLD-MCNC: 77 MG/DL (ref 74–99)
METER GLUCOSE: 79 MG/DL (ref 74–99)
MRSA CULTURE ONLY: NORMAL
POTASSIUM SERPL-SCNC: 4.1 MMOL/L (ref 3.5–5)
POTASSIUM SERPL-SCNC: 4.3 MMOL/L (ref 3.5–5)
SODIUM BLD-SCNC: 131 MMOL/L (ref 132–146)
SODIUM BLD-SCNC: 132 MMOL/L (ref 132–146)
SODIUM BLD-SCNC: 133 MMOL/L (ref 132–146)

## 2019-11-13 PROCEDURE — 82962 GLUCOSE BLOOD TEST: CPT

## 2019-11-13 PROCEDURE — 6370000000 HC RX 637 (ALT 250 FOR IP): Performed by: STUDENT IN AN ORGANIZED HEALTH CARE EDUCATION/TRAINING PROGRAM

## 2019-11-13 PROCEDURE — 2700000000 HC OXYGEN THERAPY PER DAY

## 2019-11-13 PROCEDURE — 36415 COLL VENOUS BLD VENIPUNCTURE: CPT

## 2019-11-13 PROCEDURE — 2580000003 HC RX 258: Performed by: STUDENT IN AN ORGANIZED HEALTH CARE EDUCATION/TRAINING PROGRAM

## 2019-11-13 PROCEDURE — 6370000000 HC RX 637 (ALT 250 FOR IP): Performed by: INTERNAL MEDICINE

## 2019-11-13 PROCEDURE — 84295 ASSAY OF SERUM SODIUM: CPT

## 2019-11-13 PROCEDURE — 99232 SBSQ HOSP IP/OBS MODERATE 35: CPT | Performed by: FAMILY MEDICINE

## 2019-11-13 PROCEDURE — 1200000000 HC SEMI PRIVATE

## 2019-11-13 PROCEDURE — 80048 BASIC METABOLIC PNL TOTAL CA: CPT

## 2019-11-13 RX ORDER — LEVOTHYROXINE SODIUM 0.07 MG/1
75 TABLET ORAL DAILY
Status: DISCONTINUED | OUTPATIENT
Start: 2019-11-14 | End: 2019-11-14 | Stop reason: HOSPADM

## 2019-11-13 RX ADMIN — Medication 10 ML: at 09:23

## 2019-11-13 RX ADMIN — FERROUS SULFATE TAB 325 MG (65 MG ELEMENTAL FE) 325 MG: 325 (65 FE) TAB at 20:59

## 2019-11-13 RX ADMIN — FAMOTIDINE 20 MG: 20 TABLET, FILM COATED ORAL at 20:59

## 2019-11-13 RX ADMIN — APIXABAN 5 MG: 5 TABLET, FILM COATED ORAL at 09:21

## 2019-11-13 RX ADMIN — METOPROLOL SUCCINATE 150 MG: 100 TABLET, EXTENDED RELEASE ORAL at 09:21

## 2019-11-13 RX ADMIN — DESMOPRESSIN ACETATE 200 MCG: 0.1 TABLET ORAL at 09:21

## 2019-11-13 RX ADMIN — APIXABAN 5 MG: 5 TABLET, FILM COATED ORAL at 20:59

## 2019-11-13 RX ADMIN — OXYCODONE HYDROCHLORIDE AND ACETAMINOPHEN 1 TABLET: 10; 325 TABLET ORAL at 16:20

## 2019-11-13 RX ADMIN — MOMETASONE FUROATE AND FORMOTEROL FUMARATE DIHYDRATE 2 PUFF: 100; 5 AEROSOL RESPIRATORY (INHALATION) at 09:21

## 2019-11-13 RX ADMIN — ESCITALOPRAM 10 MG: 10 TABLET, FILM COATED ORAL at 09:21

## 2019-11-13 RX ADMIN — PROMETHAZINE HYDROCHLORIDE AND CODEINE PHOSPHATE 10 ML: 6.25; 1 SOLUTION ORAL at 05:38

## 2019-11-13 RX ADMIN — DESMOPRESSIN ACETATE 200 MCG: 0.1 TABLET ORAL at 20:59

## 2019-11-13 RX ADMIN — MOMETASONE FUROATE AND FORMOTEROL FUMARATE DIHYDRATE 2 PUFF: 100; 5 AEROSOL RESPIRATORY (INHALATION) at 21:01

## 2019-11-13 RX ADMIN — FERROUS SULFATE TAB 325 MG (65 MG ELEMENTAL FE) 325 MG: 325 (65 FE) TAB at 09:26

## 2019-11-13 RX ADMIN — FAMOTIDINE 20 MG: 20 TABLET, FILM COATED ORAL at 09:23

## 2019-11-13 RX ADMIN — OXYCODONE HYDROCHLORIDE AND ACETAMINOPHEN 1 TABLET: 10; 325 TABLET ORAL at 05:38

## 2019-11-13 RX ADMIN — LEVOTHYROXINE SODIUM 50 MCG: 50 TABLET ORAL at 05:37

## 2019-11-13 RX ADMIN — PREDNISONE 5 MG: 5 TABLET ORAL at 09:23

## 2019-11-13 RX ADMIN — Medication 10 ML: at 20:59

## 2019-11-13 RX ADMIN — PROMETHAZINE HYDROCHLORIDE AND CODEINE PHOSPHATE 10 ML: 6.25; 1 SOLUTION ORAL at 16:28

## 2019-11-13 RX ADMIN — AMLODIPINE BESYLATE 10 MG: 10 TABLET ORAL at 09:21

## 2019-11-13 ASSESSMENT — PAIN DESCRIPTION - PAIN TYPE
TYPE: ACUTE PAIN
TYPE: ACUTE PAIN

## 2019-11-13 ASSESSMENT — PAIN DESCRIPTION - FREQUENCY: FREQUENCY: CONTINUOUS

## 2019-11-13 ASSESSMENT — PAIN SCALES - GENERAL
PAINLEVEL_OUTOF10: 4
PAINLEVEL_OUTOF10: 7
PAINLEVEL_OUTOF10: 7

## 2019-11-13 ASSESSMENT — PAIN DESCRIPTION - ONSET: ONSET: ON-GOING

## 2019-11-13 ASSESSMENT — PAIN DESCRIPTION - ORIENTATION: ORIENTATION: LEFT

## 2019-11-13 ASSESSMENT — PAIN DESCRIPTION - PROGRESSION: CLINICAL_PROGRESSION: NOT CHANGED

## 2019-11-13 ASSESSMENT — PAIN DESCRIPTION - DESCRIPTORS
DESCRIPTORS: ACHING;DISCOMFORT
DESCRIPTORS: ACHING;DISCOMFORT

## 2019-11-13 ASSESSMENT — PAIN DESCRIPTION - LOCATION
LOCATION: NECK
LOCATION: NECK

## 2019-11-14 VITALS
SYSTOLIC BLOOD PRESSURE: 129 MMHG | RESPIRATION RATE: 16 BRPM | HEIGHT: 60 IN | WEIGHT: 196.65 LBS | OXYGEN SATURATION: 99 % | HEART RATE: 62 BPM | TEMPERATURE: 96.8 F | BODY MASS INDEX: 38.61 KG/M2 | DIASTOLIC BLOOD PRESSURE: 75 MMHG

## 2019-11-14 PROBLEM — R07.9 CHEST PAIN: Status: RESOLVED | Noted: 2019-11-11 | Resolved: 2019-11-14

## 2019-11-14 LAB
ALBUMIN SERPL-MCNC: 3.4 G/DL (ref 3.5–5.2)
ALP BLD-CCNC: 83 U/L (ref 35–104)
ALT SERPL-CCNC: 8 U/L (ref 0–32)
ANION GAP SERPL CALCULATED.3IONS-SCNC: 11 MMOL/L (ref 7–16)
AST SERPL-CCNC: 18 U/L (ref 0–31)
BASOPHILS ABSOLUTE: 0.02 E9/L (ref 0–0.2)
BASOPHILS RELATIVE PERCENT: 0.4 % (ref 0–2)
BILIRUB SERPL-MCNC: 0.2 MG/DL (ref 0–1.2)
BUN BLDV-MCNC: 23 MG/DL (ref 8–23)
CALCIUM SERPL-MCNC: 8.8 MG/DL (ref 8.6–10.2)
CHLORIDE BLD-SCNC: 93 MMOL/L (ref 98–107)
CO2: 32 MMOL/L (ref 22–29)
CREAT SERPL-MCNC: 1 MG/DL (ref 0.5–1)
EOSINOPHILS ABSOLUTE: 0.22 E9/L (ref 0.05–0.5)
EOSINOPHILS RELATIVE PERCENT: 4.6 % (ref 0–6)
GFR AFRICAN AMERICAN: >60
GFR NON-AFRICAN AMERICAN: >60 ML/MIN/1.73
GLUCOSE BLD-MCNC: 76 MG/DL (ref 74–99)
HCT VFR BLD CALC: 30 % (ref 34–48)
HEMOGLOBIN: 9 G/DL (ref 11.5–15.5)
IMMATURE GRANULOCYTES #: 0.02 E9/L
IMMATURE GRANULOCYTES %: 0.4 % (ref 0–5)
LYMPHOCYTES ABSOLUTE: 0.77 E9/L (ref 1.5–4)
LYMPHOCYTES RELATIVE PERCENT: 16 % (ref 20–42)
MCH RBC QN AUTO: 25.9 PG (ref 26–35)
MCHC RBC AUTO-ENTMCNC: 30 % (ref 32–34.5)
MCV RBC AUTO: 86.2 FL (ref 80–99.9)
METER GLUCOSE: 88 MG/DL (ref 74–99)
MONOCYTES ABSOLUTE: 0.57 E9/L (ref 0.1–0.95)
MONOCYTES RELATIVE PERCENT: 11.9 % (ref 2–12)
NEUTROPHILS ABSOLUTE: 3.2 E9/L (ref 1.8–7.3)
NEUTROPHILS RELATIVE PERCENT: 66.7 % (ref 43–80)
PDW BLD-RTO: 13.9 FL (ref 11.5–15)
PLATELET # BLD: 178 E9/L (ref 130–450)
PMV BLD AUTO: 9.6 FL (ref 7–12)
POTASSIUM REFLEX MAGNESIUM: 4.8 MMOL/L (ref 3.5–5)
RBC # BLD: 3.48 E12/L (ref 3.5–5.5)
SODIUM BLD-SCNC: 133 MMOL/L (ref 132–146)
SODIUM BLD-SCNC: 136 MMOL/L (ref 132–146)
SODIUM BLD-SCNC: 136 MMOL/L (ref 132–146)
TOTAL PROTEIN: 6.3 G/DL (ref 6.4–8.3)
WBC # BLD: 4.8 E9/L (ref 4.5–11.5)

## 2019-11-14 PROCEDURE — 2700000000 HC OXYGEN THERAPY PER DAY

## 2019-11-14 PROCEDURE — 36415 COLL VENOUS BLD VENIPUNCTURE: CPT

## 2019-11-14 PROCEDURE — 85025 COMPLETE CBC W/AUTO DIFF WBC: CPT

## 2019-11-14 PROCEDURE — 6370000000 HC RX 637 (ALT 250 FOR IP): Performed by: STUDENT IN AN ORGANIZED HEALTH CARE EDUCATION/TRAINING PROGRAM

## 2019-11-14 PROCEDURE — 84295 ASSAY OF SERUM SODIUM: CPT

## 2019-11-14 PROCEDURE — 6370000000 HC RX 637 (ALT 250 FOR IP): Performed by: INTERNAL MEDICINE

## 2019-11-14 PROCEDURE — 82962 GLUCOSE BLOOD TEST: CPT

## 2019-11-14 PROCEDURE — 99239 HOSP IP/OBS DSCHRG MGMT >30: CPT | Performed by: FAMILY MEDICINE

## 2019-11-14 PROCEDURE — 80053 COMPREHEN METABOLIC PANEL: CPT

## 2019-11-14 PROCEDURE — 2580000003 HC RX 258: Performed by: STUDENT IN AN ORGANIZED HEALTH CARE EDUCATION/TRAINING PROGRAM

## 2019-11-14 RX ORDER — DESMOPRESSIN ACETATE 0.1 MG/1
0.2 TABLET ORAL 2 TIMES DAILY
Qty: 30 TABLET | Refills: 3 | Status: SHIPPED | OUTPATIENT
Start: 2019-11-14 | End: 2020-02-03 | Stop reason: SDUPTHER

## 2019-11-14 RX ORDER — METOPROLOL SUCCINATE 50 MG/1
TABLET, EXTENDED RELEASE ORAL
Qty: 90 TABLET | Refills: 3 | Status: SHIPPED | OUTPATIENT
Start: 2019-11-14 | End: 2020-07-21 | Stop reason: SDUPTHER

## 2019-11-14 RX ADMIN — FAMOTIDINE 20 MG: 20 TABLET, FILM COATED ORAL at 09:29

## 2019-11-14 RX ADMIN — METOPROLOL SUCCINATE 150 MG: 100 TABLET, EXTENDED RELEASE ORAL at 09:29

## 2019-11-14 RX ADMIN — DESMOPRESSIN ACETATE 200 MCG: 0.1 TABLET ORAL at 09:28

## 2019-11-14 RX ADMIN — OXYCODONE HYDROCHLORIDE AND ACETAMINOPHEN 1 TABLET: 10; 325 TABLET ORAL at 10:18

## 2019-11-14 RX ADMIN — Medication 10 ML: at 09:28

## 2019-11-14 RX ADMIN — FERROUS SULFATE TAB 325 MG (65 MG ELEMENTAL FE) 325 MG: 325 (65 FE) TAB at 08:37

## 2019-11-14 RX ADMIN — PREDNISONE 5 MG: 5 TABLET ORAL at 09:28

## 2019-11-14 RX ADMIN — MOMETASONE FUROATE AND FORMOTEROL FUMARATE DIHYDRATE 2 PUFF: 100; 5 AEROSOL RESPIRATORY (INHALATION) at 09:27

## 2019-11-14 RX ADMIN — LEVOTHYROXINE SODIUM 75 MCG: 75 TABLET ORAL at 06:52

## 2019-11-14 RX ADMIN — PROMETHAZINE HYDROCHLORIDE AND CODEINE PHOSPHATE 10 ML: 6.25; 1 SOLUTION ORAL at 00:23

## 2019-11-14 RX ADMIN — AMLODIPINE BESYLATE 10 MG: 10 TABLET ORAL at 09:28

## 2019-11-14 RX ADMIN — APIXABAN 5 MG: 5 TABLET, FILM COATED ORAL at 09:29

## 2019-11-14 RX ADMIN — OXYCODONE HYDROCHLORIDE AND ACETAMINOPHEN 1 TABLET: 10; 325 TABLET ORAL at 00:23

## 2019-11-14 RX ADMIN — PROMETHAZINE HYDROCHLORIDE AND CODEINE PHOSPHATE 10 ML: 6.25; 1 SOLUTION ORAL at 09:30

## 2019-11-14 RX ADMIN — ESCITALOPRAM 10 MG: 10 TABLET, FILM COATED ORAL at 09:28

## 2019-11-14 ASSESSMENT — PAIN DESCRIPTION - PROGRESSION
CLINICAL_PROGRESSION: NOT CHANGED
CLINICAL_PROGRESSION: NOT CHANGED

## 2019-11-14 ASSESSMENT — PAIN DESCRIPTION - PAIN TYPE
TYPE: ACUTE PAIN

## 2019-11-14 ASSESSMENT — PAIN DESCRIPTION - DESCRIPTORS
DESCRIPTORS: HEADACHE

## 2019-11-14 ASSESSMENT — PAIN DESCRIPTION - ONSET
ONSET: ON-GOING
ONSET: ON-GOING

## 2019-11-14 ASSESSMENT — PAIN DESCRIPTION - FREQUENCY
FREQUENCY: CONTINUOUS
FREQUENCY: CONTINUOUS

## 2019-11-14 ASSESSMENT — PAIN DESCRIPTION - LOCATION
LOCATION: HEAD

## 2019-11-14 ASSESSMENT — PAIN DESCRIPTION - ORIENTATION
ORIENTATION: POSTERIOR
ORIENTATION: POSTERIOR

## 2019-11-14 ASSESSMENT — PAIN SCALES - GENERAL
PAINLEVEL_OUTOF10: 8
PAINLEVEL_OUTOF10: 10
PAINLEVEL_OUTOF10: 9

## 2019-11-15 ENCOUNTER — CARE COORDINATION (OUTPATIENT)
Dept: CARE COORDINATION | Age: 64
End: 2019-11-15

## 2019-11-16 LAB
BLOOD CULTURE, ROUTINE: NORMAL
CULTURE, BLOOD 2: NORMAL

## 2019-11-21 RX ORDER — LEVOTHYROXINE SODIUM 0.07 MG/1
75 TABLET ORAL DAILY
Qty: 30 TABLET | Refills: 5 | Status: SHIPPED
Start: 2019-11-21 | End: 2020-05-11 | Stop reason: SDUPTHER

## 2019-11-22 DIAGNOSIS — M17.0 PRIMARY OSTEOARTHRITIS OF BOTH KNEES: ICD-10-CM

## 2019-11-22 DIAGNOSIS — R53.81 DEBILITY: ICD-10-CM

## 2019-11-22 DIAGNOSIS — I48.0 PAF (PAROXYSMAL ATRIAL FIBRILLATION) (HCC): ICD-10-CM

## 2019-11-22 DIAGNOSIS — J96.11 CHRONIC RESPIRATORY FAILURE WITH HYPOXIA (HCC): Primary | ICD-10-CM

## 2019-11-22 DIAGNOSIS — N18.30 CHRONIC KIDNEY DISEASE, STAGE III (MODERATE) (HCC): ICD-10-CM

## 2019-11-22 RX ORDER — OXYCODONE AND ACETAMINOPHEN 10; 325 MG/1; MG/1
1 TABLET ORAL EVERY 6 HOURS PRN
Qty: 120 TABLET | Refills: 0 | Status: SHIPPED | OUTPATIENT
Start: 2019-11-22 | End: 2019-12-20 | Stop reason: SDUPTHER

## 2019-12-02 ENCOUNTER — TELEPHONE (OUTPATIENT)
Dept: FAMILY MEDICINE CLINIC | Age: 64
End: 2019-12-02

## 2019-12-14 RX ORDER — POLYMYXIN B SULFATE AND TRIMETHOPRIM 1; 10000 MG/ML; [USP'U]/ML
1 SOLUTION OPHTHALMIC EVERY 4 HOURS
Qty: 10 ML | Refills: 0 | Status: SHIPPED
Start: 2019-12-14 | End: 2020-04-14 | Stop reason: SDUPTHER

## 2019-12-14 RX ORDER — ESCITALOPRAM OXALATE 10 MG/1
10 TABLET ORAL DAILY
Qty: 30 TABLET | Refills: 2 | Status: SHIPPED | OUTPATIENT
Start: 2019-12-14 | End: 2020-02-03 | Stop reason: SDUPTHER

## 2019-12-19 ENCOUNTER — OFFICE VISIT (OUTPATIENT)
Dept: CARDIOLOGY CLINIC | Age: 64
End: 2019-12-19
Payer: COMMERCIAL

## 2019-12-19 VITALS
RESPIRATION RATE: 16 BRPM | HEIGHT: 60 IN | DIASTOLIC BLOOD PRESSURE: 60 MMHG | SYSTOLIC BLOOD PRESSURE: 98 MMHG | HEART RATE: 62 BPM | BODY MASS INDEX: 38.41 KG/M2

## 2019-12-19 DIAGNOSIS — I51.9 HEART PROBLEM: Primary | ICD-10-CM

## 2019-12-19 PROCEDURE — 93000 ELECTROCARDIOGRAM COMPLETE: CPT | Performed by: INTERNAL MEDICINE

## 2019-12-19 PROCEDURE — 99213 OFFICE O/P EST LOW 20 MIN: CPT | Performed by: INTERNAL MEDICINE

## 2019-12-19 PROCEDURE — G8427 DOCREV CUR MEDS BY ELIG CLIN: HCPCS | Performed by: INTERNAL MEDICINE

## 2019-12-19 PROCEDURE — 1036F TOBACCO NON-USER: CPT | Performed by: INTERNAL MEDICINE

## 2019-12-19 PROCEDURE — 3017F COLORECTAL CA SCREEN DOC REV: CPT | Performed by: INTERNAL MEDICINE

## 2019-12-19 PROCEDURE — G8482 FLU IMMUNIZE ORDER/ADMIN: HCPCS | Performed by: INTERNAL MEDICINE

## 2019-12-19 PROCEDURE — G8417 CALC BMI ABV UP PARAM F/U: HCPCS | Performed by: INTERNAL MEDICINE

## 2019-12-20 ENCOUNTER — TELEPHONE (OUTPATIENT)
Dept: FAMILY MEDICINE CLINIC | Age: 64
End: 2019-12-20

## 2019-12-20 DIAGNOSIS — M17.0 PRIMARY OSTEOARTHRITIS OF BOTH KNEES: ICD-10-CM

## 2019-12-20 RX ORDER — OXYCODONE AND ACETAMINOPHEN 10; 325 MG/1; MG/1
1 TABLET ORAL EVERY 6 HOURS PRN
Qty: 120 TABLET | Refills: 0 | Status: SHIPPED | OUTPATIENT
Start: 2019-12-20 | End: 2020-01-17 | Stop reason: SDUPTHER

## 2019-12-20 NOTE — TELEPHONE ENCOUNTER
Has dark round spot \" looks like a mole\" on left breast not painful , noticed it 1 week ago. She would like for you to check it at her visit 1/6/20.

## 2020-01-03 ENCOUNTER — HOSPITAL ENCOUNTER (OUTPATIENT)
Dept: CT IMAGING | Age: 65
Discharge: HOME | End: 2020-01-05
Payer: COMMERCIAL

## 2020-01-03 PROCEDURE — 71250 CT THORAX DX C-: CPT

## 2020-01-06 ENCOUNTER — HOSPITAL ENCOUNTER (OUTPATIENT)
Age: 65
Discharge: HOME | End: 2020-01-08
Payer: COMMERCIAL

## 2020-01-06 ENCOUNTER — OFFICE VISIT (OUTPATIENT)
Dept: FAMILY MEDICINE CLINIC | Age: 65
End: 2020-01-06
Payer: COMMERCIAL

## 2020-01-06 VITALS
DIASTOLIC BLOOD PRESSURE: 74 MMHG | HEART RATE: 64 BPM | OXYGEN SATURATION: 96 % | RESPIRATION RATE: 18 BRPM | SYSTOLIC BLOOD PRESSURE: 116 MMHG | TEMPERATURE: 98.1 F

## 2020-01-06 PROCEDURE — G8926 SPIRO NO PERF OR DOC: HCPCS | Performed by: FAMILY MEDICINE

## 2020-01-06 PROCEDURE — G8417 CALC BMI ABV UP PARAM F/U: HCPCS | Performed by: FAMILY MEDICINE

## 2020-01-06 PROCEDURE — G8482 FLU IMMUNIZE ORDER/ADMIN: HCPCS | Performed by: FAMILY MEDICINE

## 2020-01-06 PROCEDURE — 36415 COLL VENOUS BLD VENIPUNCTURE: CPT | Performed by: FAMILY MEDICINE

## 2020-01-06 PROCEDURE — 3017F COLORECTAL CA SCREEN DOC REV: CPT | Performed by: FAMILY MEDICINE

## 2020-01-06 PROCEDURE — 3023F SPIROM DOC REV: CPT | Performed by: FAMILY MEDICINE

## 2020-01-06 PROCEDURE — 1036F TOBACCO NON-USER: CPT | Performed by: FAMILY MEDICINE

## 2020-01-06 PROCEDURE — 99212 OFFICE O/P EST SF 10 MIN: CPT | Performed by: FAMILY MEDICINE

## 2020-01-06 PROCEDURE — G8428 CUR MEDS NOT DOCUMENT: HCPCS | Performed by: FAMILY MEDICINE

## 2020-01-06 PROCEDURE — 99213 OFFICE O/P EST LOW 20 MIN: CPT | Performed by: FAMILY MEDICINE

## 2020-01-06 RX ORDER — PROMETHAZINE HYDROCHLORIDE AND CODEINE PHOSPHATE 6.25; 1 MG/5ML; MG/5ML
10 SYRUP ORAL 4 TIMES DAILY PRN
Qty: 473 ML | Refills: 2 | Status: SHIPPED
Start: 2020-01-06 | End: 2020-03-02 | Stop reason: SDUPTHER

## 2020-01-06 NOTE — PROGRESS NOTES
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    Osteoarthritis     severe    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HCC)     PCWP mean:26.0 PA mean: 24.00; denies any recent issues    Pulmonary hypertension (HCC)     ventricular diastolic function consistent with abnormal relaxation (stage I)    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, f/u w/ PCP    Rhabdomyolysis 5/9/2011    Steroid-induced avascular necrosis of shoulder (HCC)     Right    Tibia fracture 7/10       Current Outpatient Medications on File Prior to Visit   Medication Sig Dispense Refill    oxyCODONE-acetaminophen (PERCOCET)  MG per tablet Take 1 tablet by mouth every 6 hours as needed for Pain for up to 30 days. 120 tablet 0    escitalopram (LEXAPRO) 10 MG tablet Take 1 tablet by mouth daily 30 tablet 2    mupirocin (BACTROBAN) 2 % ointment Apply 3 times daily.  22 g 2    levothyroxine (SYNTHROID) 75 MCG tablet Take 1 tablet by mouth daily 30 tablet 5    desmopressin (DDAVP) 0.1 MG tablet Take 2 tablets by mouth 2 times daily 30 tablet 3    metoprolol succinate (TOPROL XL) 50 MG extended release tablet TAKE ONE TABLET EVERY DAY WITH THE 100MG 90 tablet 3    ferrous sulfate 325 (65 Fe) MG tablet Take 1 tablet by mouth 2 times daily 60 tablet 5    albuterol sulfate HFA (PROVENTIL HFA) 108 (90 Base) MCG/ACT inhaler Inhale 2 puffs into the lungs every 6 hours as needed for Wheezing or Shortness of Breath 1 Inhaler 3    ipratropium-albuterol (DUONEB) 0.5-2.5 (3) MG/3ML SOLN nebulizer solution Inhale 3 mLs into the lungs every 6 hours as needed for Shortness of Breath 120 vial 3    predniSONE (DELTASONE) 5 MG tablet Take 1 tablet by mouth daily 30 tablet 5    potassium chloride (KLOR-CON M) 20 MEQ TBCR extended release tablet Take 1 tablet by mouth daily (with breakfast) 30 tablet 2    amLODIPine (NORVASC) 5 MG tablet Take 2 tablets by mouth daily (Patient taking differently: Take 5 mg by mouth 2 times daily ) 30 tablet 6    BREO ELLIPTA 100-25 MCG/INH AEPB inhaler Inhale 1 puff into the lungs daily 28 each 5    Omega 3 1000 MG CAPS Take 1 each by mouth daily 90 capsule 1    famotidine (PEPCID) 20 MG tablet Take 1 tablet by mouth 2 times daily 60 tablet 5    Zinc Oxide 10 % OINT Apply BID to affected area (Patient taking differently: as needed Apply BID to affected area) 85 g 5    Menthol, Topical Analgesic, (BIOFREEZE) 4 % GEL Apply BID to affected areas 150 mL 5    OXYGEN 4 L/min by Nasal route as needed. BMS       No current facility-administered medications on file prior to visit. No Known Allergies    Family History   Problem Relation Age of Onset    Diabetes Mother    Equilla Diana Arthritis Mother     High Blood Pressure Mother     Arthritis Father     High Blood Pressure Father     Diabetes Father     High Blood Pressure Sister     Alcohol Abuse Sister     Substance Abuse Brother     Substance Abuse Sister     Coronary Art Dis Neg Hx        Past Surgical History:   Procedure Laterality Date    ABDOMEN SURGERY  2008    ischemic colitis    COLONOSCOPY  2008    healed colitis    COLONOSCOPY  04/11/2014    COLONOSCOPY  11/23/2015    severe colitis    COLONOSCOPY  10/24/2016    COLONOSCOPY  07/26/2017    ECHO COMPL W DOP COLOR FLOW  8/16/2015         ECHO COMPLETE  4/22/2013         FRACTURE SURGERY  2010    Tibial right    HEMORRHOID SURGERY  12/08/2016    KYPHOSIS SURGERY      For comp.  fx T10    LUMBAR DISC SURGERY      OTHER SURGICAL HISTORY  11/1/11    removal r leg external fixator    Jed Li / Gordon Vianey LENGTHENING      application TSF  56    UPPER GASTROINTESTINAL ENDOSCOPY  2016    UPPER GASTROINTESTINAL ENDOSCOPY  2017       Social History     Tobacco Use    Smoking status: Former Smoker     Packs/day: 0.75     Years: 25.00     Pack years: 18.75     Types: Cigarettes     Last attempt to quit: 9/10/1996     Years since quittin.3    Smokeless tobacco: Never Used   Substance Use Topics    Alcohol use: No     Alcohol/week: 0.0 standard drinks     Comment: drinks mountain dew and clear pops. \"i drink alot of pop\" maybe 2 liters in a day.  Drug use: No     Comment:  coccaine       ROS: Negative except as stated in HPI. Objective:    VS:  Blood pressure 116/74, pulse 64, temperature 98.1 °F (36.7 °C), temperature source Oral, resp. rate 18, SpO2 96 %, not currently breastfeeding. Physical Exam  Chest:              Most recent labs and imaging reviewed. Findings include:     N/A    Ganesh Lang was seen today for breast problem. Diagnoses and all orders for this visit:    Benign essential hypertension  -     Basic Metabolic Panel; Future    PAF (paroxysmal atrial fibrillation) (HCC) currently in NSR    Chronic combined systolic and diastolic heart failure (HCC)    Chronic respiratory failure with hypoxia ([address]    Primary hypothyroidism    Diabetes insipidus, neurohypophyseal ([address]    Steroid-induced avascular necrosis of right shoulder ([address]  -     Sanam Melendez MD, Pain Medicine, [place]    Chronic kidney disease, stage III (moderate) ([address]  -     Basic Metabolic Panel; Future    Chronic bilateral low back pain without sciatica  -     Sanam Melendez MD, Pain Medicine, [place]    Sarcoidosis    COPD, moderate ([address]  -     promethazine-codeine (PHENERGAN WITH CODEINE) 6.25-10 MG/5ML syrup; Take 10 mLs by mouth 4 times daily as needed for Cough for up to 90 days.     Primary osteoarthritis of both knees    Seborrheic keratosis        Additional Plan:  See

## 2020-01-07 LAB
ANION GAP SERPL CALCULATED.3IONS-SCNC: 13 MMOL/L (ref 7–16)
BUN BLDV-MCNC: 18 MG/DL (ref 8–23)
CALCIUM SERPL-MCNC: 9.6 MG/DL (ref 8.6–10.2)
CHLORIDE BLD-SCNC: 101 MMOL/L (ref 98–107)
CO2: 31 MMOL/L (ref 22–29)
CREAT SERPL-MCNC: 1.1 MG/DL (ref 0.5–1)
GFR AFRICAN AMERICAN: >60
GFR NON-AFRICAN AMERICAN: >60 ML/MIN/1.73
GLUCOSE BLD-MCNC: 71 MG/DL (ref 74–99)
POTASSIUM SERPL-SCNC: 4.8 MMOL/L (ref 3.5–5)
SODIUM BLD-SCNC: 145 MMOL/L (ref 132–146)

## 2020-01-07 PROCEDURE — 80048 BASIC METABOLIC PNL TOTAL CA: CPT

## 2020-01-16 ENCOUNTER — HOSPITAL ENCOUNTER (OUTPATIENT)
Dept: CARDIOLOGY | Age: 65
Discharge: HOME | End: 2020-01-16
Payer: COMMERCIAL

## 2020-01-16 LAB
LV EF: 60 %
LVEF MODALITY: NORMAL

## 2020-01-16 PROCEDURE — 93306 TTE W/DOPPLER COMPLETE: CPT

## 2020-01-17 RX ORDER — FAMOTIDINE 20 MG/1
20 TABLET, FILM COATED ORAL 2 TIMES DAILY
Qty: 60 TABLET | Refills: 5 | Status: SHIPPED | OUTPATIENT
Start: 2020-01-17 | End: 2020-02-03

## 2020-01-17 RX ORDER — FLUTICASONE FUROATE AND VILANTEROL TRIFENATATE 100; 25 UG/1; UG/1
1 POWDER RESPIRATORY (INHALATION) DAILY
Qty: 28 EACH | Refills: 5 | Status: SHIPPED
Start: 2020-01-17 | End: 2020-07-31 | Stop reason: SDUPTHER

## 2020-01-17 RX ORDER — OXYCODONE AND ACETAMINOPHEN 10; 325 MG/1; MG/1
1 TABLET ORAL EVERY 6 HOURS PRN
Qty: 120 TABLET | Refills: 0 | Status: SHIPPED | OUTPATIENT
Start: 2020-01-17 | End: 2020-02-03

## 2020-02-03 ENCOUNTER — OFFICE VISIT (OUTPATIENT)
Dept: FAMILY MEDICINE CLINIC | Age: 65
End: 2020-02-03
Payer: COMMERCIAL

## 2020-02-03 ENCOUNTER — HOSPITAL ENCOUNTER (OUTPATIENT)
Age: 65
Discharge: HOME | End: 2020-02-05
Payer: COMMERCIAL

## 2020-02-03 VITALS
HEIGHT: 60 IN | SYSTOLIC BLOOD PRESSURE: 97 MMHG | TEMPERATURE: 98.7 F | BODY MASS INDEX: 37.11 KG/M2 | HEART RATE: 66 BPM | OXYGEN SATURATION: 96 % | DIASTOLIC BLOOD PRESSURE: 67 MMHG | WEIGHT: 189 LBS

## 2020-02-03 PROCEDURE — G8926 SPIRO NO PERF OR DOC: HCPCS | Performed by: FAMILY MEDICINE

## 2020-02-03 PROCEDURE — 3017F COLORECTAL CA SCREEN DOC REV: CPT | Performed by: FAMILY MEDICINE

## 2020-02-03 PROCEDURE — 1036F TOBACCO NON-USER: CPT | Performed by: FAMILY MEDICINE

## 2020-02-03 PROCEDURE — 36415 COLL VENOUS BLD VENIPUNCTURE: CPT | Performed by: FAMILY MEDICINE

## 2020-02-03 PROCEDURE — G8417 CALC BMI ABV UP PARAM F/U: HCPCS | Performed by: FAMILY MEDICINE

## 2020-02-03 PROCEDURE — 3023F SPIROM DOC REV: CPT | Performed by: FAMILY MEDICINE

## 2020-02-03 PROCEDURE — 36415 COLL VENOUS BLD VENIPUNCTURE: CPT

## 2020-02-03 PROCEDURE — G8482 FLU IMMUNIZE ORDER/ADMIN: HCPCS | Performed by: FAMILY MEDICINE

## 2020-02-03 PROCEDURE — 80048 BASIC METABOLIC PNL TOTAL CA: CPT

## 2020-02-03 PROCEDURE — 99214 OFFICE O/P EST MOD 30 MIN: CPT | Performed by: FAMILY MEDICINE

## 2020-02-03 PROCEDURE — 99212 OFFICE O/P EST SF 10 MIN: CPT | Performed by: FAMILY MEDICINE

## 2020-02-03 PROCEDURE — G8427 DOCREV CUR MEDS BY ELIG CLIN: HCPCS | Performed by: FAMILY MEDICINE

## 2020-02-03 RX ORDER — POTASSIUM CHLORIDE 20 MEQ/1
TABLET, EXTENDED RELEASE ORAL
COMMUNITY
Start: 2019-12-30 | End: 2020-02-03 | Stop reason: ALTCHOICE

## 2020-02-03 RX ORDER — OXYCODONE AND ACETAMINOPHEN 10; 325 MG/1; MG/1
1 TABLET ORAL EVERY 6 HOURS PRN
Qty: 120 TABLET | Refills: 0 | Status: CANCELLED | OUTPATIENT
Start: 2020-02-03 | End: 2020-03-04

## 2020-02-03 RX ORDER — METOPROLOL SUCCINATE 100 MG/1
100 TABLET, EXTENDED RELEASE ORAL DAILY
COMMUNITY
Start: 2020-01-17 | End: 2020-06-08 | Stop reason: SDUPTHER

## 2020-02-03 RX ORDER — FAMOTIDINE 40 MG/1
TABLET, FILM COATED ORAL
COMMUNITY
Start: 2020-01-17 | End: 2020-02-03 | Stop reason: SDUPTHER

## 2020-02-03 RX ORDER — ALBUTEROL SULFATE 90 UG/1
2 AEROSOL, METERED RESPIRATORY (INHALATION) EVERY 6 HOURS PRN
Qty: 1 INHALER | Refills: 3 | Status: SHIPPED
Start: 2020-02-03 | End: 2021-03-19 | Stop reason: SDUPTHER

## 2020-02-03 RX ORDER — CYCLOSPORINE 0.5 MG/ML
EMULSION OPHTHALMIC
COMMUNITY
Start: 2020-01-21 | End: 2020-04-03 | Stop reason: SDUPTHER

## 2020-02-03 RX ORDER — OMEPRAZOLE 40 MG/1
CAPSULE, DELAYED RELEASE ORAL
COMMUNITY
Start: 2019-12-30 | End: 2020-02-03

## 2020-02-03 RX ORDER — AMLODIPINE BESYLATE 5 MG/1
5 TABLET ORAL DAILY
Qty: 30 TABLET | Refills: 5 | Status: SHIPPED
Start: 2020-02-03 | End: 2020-08-11 | Stop reason: SDUPTHER

## 2020-02-03 RX ORDER — POTASSIUM CHLORIDE 20 MEQ/1
TABLET, EXTENDED RELEASE ORAL
Qty: 60 TABLET | Status: CANCELLED | OUTPATIENT
Start: 2020-02-03

## 2020-02-03 RX ORDER — OXYCODONE AND ACETAMINOPHEN 7.5; 325 MG/1; MG/1
1 TABLET ORAL EVERY 6 HOURS PRN
Qty: 120 TABLET | Refills: 0 | Status: SHIPPED
Start: 2020-02-03 | End: 2020-03-02 | Stop reason: SDUPTHER

## 2020-02-03 RX ORDER — DESMOPRESSIN ACETATE 0.1 MG/1
0.2 TABLET ORAL 2 TIMES DAILY
Qty: 30 TABLET | Refills: 3 | Status: SHIPPED
Start: 2020-02-03 | End: 2020-10-29 | Stop reason: SDUPTHER

## 2020-02-03 RX ORDER — PREDNISONE 1 MG/1
5 TABLET ORAL DAILY
Qty: 30 TABLET | Refills: 5 | Status: SHIPPED
Start: 2020-02-03 | End: 2020-09-03 | Stop reason: SDUPTHER

## 2020-02-03 RX ORDER — DESMOPRESSIN ACETATE 0.2 MG/1
TABLET ORAL
COMMUNITY
Start: 2019-12-30 | End: 2020-02-03

## 2020-02-03 RX ORDER — FAMOTIDINE 40 MG/1
40 TABLET, FILM COATED ORAL NIGHTLY
Qty: 30 TABLET | Refills: 5 | Status: SHIPPED
Start: 2020-02-03 | End: 2020-09-28 | Stop reason: ALTCHOICE

## 2020-02-03 RX ORDER — ESCITALOPRAM OXALATE 10 MG/1
10 TABLET ORAL DAILY
Qty: 30 TABLET | Refills: 2 | Status: SHIPPED
Start: 2020-02-03 | End: 2020-05-11 | Stop reason: SDUPTHER

## 2020-02-03 RX ORDER — METOPROLOL TARTRATE 100 MG/1
TABLET ORAL
COMMUNITY
Start: 2020-01-17 | End: 2020-02-03

## 2020-02-03 RX ORDER — CYCLOSPORINE 0.5 MG/ML
EMULSION OPHTHALMIC
Qty: 1 VIAL | Status: CANCELLED | OUTPATIENT
Start: 2020-02-03

## 2020-02-03 NOTE — PROGRESS NOTES
recent issues    Pulmonary hypertension (HCC)     ventricular diastolic function consistent with abnormal relaxation (stage I)    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, f/u w/ PCP    Rhabdomyolysis 5/9/2011    Steroid-induced avascular necrosis of shoulder (HCC)     Right    Tibia fracture 7/10       Current Outpatient Medications on File Prior to Visit   Medication Sig Dispense Refill    RESTASIS 0.05 % ophthalmic emulsion       metoprolol succinate (TOPROL XL) 100 MG extended release tablet       BREO ELLIPTA 100-25 MCG/INH AEPB inhaler Inhale 1 puff into the lungs daily 28 each 5    promethazine-codeine (PHENERGAN WITH CODEINE) 6.25-10 MG/5ML syrup Take 10 mLs by mouth 4 times daily as needed for Cough for up to 90 days. 473 mL 2    levothyroxine (SYNTHROID) 75 MCG tablet Take 1 tablet by mouth daily 30 tablet 5    metoprolol succinate (TOPROL XL) 50 MG extended release tablet TAKE ONE TABLET EVERY DAY WITH THE 100MG 90 tablet 3    ferrous sulfate 325 (65 Fe) MG tablet Take 1 tablet by mouth 2 times daily 60 tablet 5    ipratropium-albuterol (DUONEB) 0.5-2.5 (3) MG/3ML SOLN nebulizer solution Inhale 3 mLs into the lungs every 6 hours as needed for Shortness of Breath 120 vial 3    potassium chloride (KLOR-CON M) 20 MEQ TBCR extended release tablet Take 1 tablet by mouth daily (with breakfast) 30 tablet 2    Omega 3 1000 MG CAPS Take 1 each by mouth daily 90 capsule 1    Zinc Oxide 10 % OINT Apply BID to affected area (Patient taking differently: as needed Apply BID to affected area) 85 g 5    Menthol, Topical Analgesic, (BIOFREEZE) 4 % GEL Apply BID to affected areas 150 mL 5    OXYGEN 4 L/min by Nasal route as needed. BMS       No current facility-administered medications on file prior to visit.         No Known Allergies    Family History   Problem Relation Age of Onset    Diabetes Mother     Arthritis Mother     High Blood Pressure Mother     Arthritis Father    [facility] High Blood Pressure Father     Diabetes Father     High Blood Pressure Sister     Alcohol Abuse Sister     Substance Abuse Brother     Substance Abuse Sister     Coronary Art Dis Neg Hx        Past Surgical History:   Procedure Laterality Date    ABDOMEN SURGERY      ischemic colitis    COLONOSCOPY  2008    healed colitis    COLONOSCOPY  2014    COLONOSCOPY  2015    severe colitis    COLONOSCOPY  10/24/2016    COLONOSCOPY  2017    ECHO COMPL W DOP COLOR FLOW  2015         ECHO COMPLETE  2013         FRACTURE SURGERY  2010    Tibial right    HEMORRHOID SURGERY  2016    KYPHOSIS SURGERY      For comp. fx T10    LUMBAR DISC SURGERY      OTHER SURGICAL HISTORY  11    removal r leg external fixator    TIBIA / Lovina Dyson LENGTHENING      application TSF  13    UPPER GASTROINTESTINAL ENDOSCOPY  2016    UPPER GASTROINTESTINAL ENDOSCOPY  2017       Social History     Tobacco Use    Smoking status: Former Smoker     Packs/day: 0.75     Years: 25.00     Pack years: 18.75     Types: Cigarettes     Last attempt to quit: 9/10/1996     Years since quittin.4    Smokeless tobacco: Never Used   Substance Use Topics    Alcohol use: No     Alcohol/week: 0.0 standard drinks     Comment: drinks mountain dew and clear pops. \"i drink alot of pop\" maybe 2 liters in a day.  Drug use: No     Comment:  tory       Review of Systems - Negative except as per HPI    Objective:    VS:  Blood pressure 97/67, pulse 66, temperature 98.7 °F (37.1 °C), temperature source Oral, height 5' (1.524 m), weight 189 lb (85.7 kg), SpO2 96 %, not currently breastfeeding. General Appearance: Obese, awake, alert, oriented, no acute distress. In wheelchair  HEENT: Normocephalic,atraumatic. PERRL. Wearing nasal cannula  Neck: Supple, symmetrical, trachea midline. No JVD. Thyroid smooth, not enlarged.   Chest wall/Lung: Clear to auscultation bilaterally,  respirations unlabored. Bilateral decreased BS and decreased excursion  Heart[de-identified]  Regular rate and rhythm, S1and S2 normal, GII/VI CAROLYNE. Extremities:  Markedly decreased ROM of right shoulder. LE's with decreased ROM and decreased strength. Trace edema  Neurologic:    Alert, oriented. Psychiatric: has a normal mood and affect. Behavior is normal.     Most recent labs and imaging reviewed. Findings include:     recent BMP normal    Gerson Ferreira was seen today for 1 month follow-up. Diagnoses and all orders for this visit:    Benign essential hypertension    PAF (paroxysmal atrial fibrillation) ([facility]) currently in NSR    Chronic combined systolic and diastolic heart failure ([facility])    Chronic respiratory failure with hypoxia ([facility])    COPD, moderate ([address]    Primary hypothyroidism    Diabetes insipidus, neurohypophyseal ([address]    Steroid-induced avascular necrosis of right shoulder ([facility])    Chronic kidney disease, stage III (moderate) ([facility])  -     Basic Metabolic Panel; Future    Chronic bilateral low back pain without sciatica  -     oxyCODONE-acetaminophen (ENDOCET) 7.5-325 MG per tablet; Take 1 tablet by mouth every 6 hours as needed for Pain for up to 30 days. Intended supply: 30 days    Recurrent major depressive disorder, in partial remission ([address]    Mixed hyperlipidemia    Sarcoidosis    Essential hypertension  -     amLODIPine (NORVASC) 5 MG tablet; Take 1 tablet by mouth daily    Primary osteoarthritis of both knees    Other orders  -     escitalopram (LEXAPRO) 10 MG tablet; Take 1 tablet by mouth daily  -     mupirocin (BACTROBAN) 2 % ointment; Apply 3 times daily. -     desmopressin (DDAVP) 0.1 MG tablet; Take 2 tablets by mouth 2 times daily  -     albuterol sulfate HFA (PROVENTIL HFA) 108 (90 Base) MCG/ACT inhaler; Inhale 2 puffs into the lungs every 6 hours as needed for Wheezing or Shortness of Breath  -     predniSONE (DELTASONE) 5 MG tablet;  Take 1 tablet by mouth daily  -     famotidine (PEPCID) 40 MG tablet; Take 1 tablet by mouth nightly      Additional Plan:  I refilled medicine and re- emphasized the need to take medications as prescribed. I suggested that she take percocet only as needed, and decrease use of phenergan with codeine. After much discussion, she agreed to decrease strength of percocet to 7.5/325. I will continue to attempt tapering of narcotics (previously on oxycontin 80 TID), and will also work on decreasing codeine cough medicine. I have also made a referral to pain management to get their input, as required through the [place]. I am decreasing amlodipine as BP is on the low side today, and I will have her try famotidine 40 daily for her GERD    RTO in in 2 month(s) or sooner for any persistent, new, or worsening symptoms. Please see Patient Instructions for further counseling and information given. Advised to be adherent to the treatment plans discussed today, and please call with any questions or concerns, letting the office know of any reasons that the plans may not be followed. The risks of untreated conditions include worsening illness, injury, disability, and possibly, death. Please call if symptoms change in any way, worsen, or fail to completely resolve, as this could necessitate a change to treatment plans. Patient and/or caregiver expressed understanding. Indications and proper use of medication(s) reviewed. Potential side-effects and risks of medication(s) also explained. Patient and/or caregiver was instructed to call if any new symptoms develop prior to next visit. Health risk factors discussed and addressed. I have personally reviewed labs and/or imaging (if any).       Signed by : Loel Felty, M.D.

## 2020-02-04 ENCOUNTER — TELEPHONE (OUTPATIENT)
Dept: FAMILY MEDICINE CLINIC | Age: 65
End: 2020-02-04

## 2020-02-04 LAB
ANION GAP SERPL CALCULATED.3IONS-SCNC: 12 MMOL/L (ref 7–16)
BUN BLDV-MCNC: 22 MG/DL (ref 8–23)
CALCIUM SERPL-MCNC: 9.9 MG/DL (ref 8.6–10.2)
CHLORIDE BLD-SCNC: 98 MMOL/L (ref 98–107)
CO2: 31 MMOL/L (ref 22–29)
CREAT SERPL-MCNC: 1 MG/DL (ref 0.5–1)
GFR AFRICAN AMERICAN: >60
GFR NON-AFRICAN AMERICAN: >60 ML/MIN/1.73
GLUCOSE BLD-MCNC: 78 MG/DL (ref 74–99)
POTASSIUM SERPL-SCNC: 4.2 MMOL/L (ref 3.5–5)
SODIUM BLD-SCNC: 141 MMOL/L (ref 132–146)

## 2020-02-12 ENCOUNTER — OFFICE VISIT (OUTPATIENT)
Dept: FAMILY MEDICINE CLINIC | Age: 65
End: 2020-02-12
Payer: COMMERCIAL

## 2020-02-12 VITALS
HEART RATE: 64 BPM | HEIGHT: 60 IN | DIASTOLIC BLOOD PRESSURE: 56 MMHG | BODY MASS INDEX: 37.11 KG/M2 | OXYGEN SATURATION: 91 % | SYSTOLIC BLOOD PRESSURE: 106 MMHG | WEIGHT: 189 LBS | TEMPERATURE: 98 F

## 2020-02-12 PROCEDURE — 99212 OFFICE O/P EST SF 10 MIN: CPT | Performed by: STUDENT IN AN ORGANIZED HEALTH CARE EDUCATION/TRAINING PROGRAM

## 2020-02-12 PROCEDURE — 1036F TOBACCO NON-USER: CPT | Performed by: STUDENT IN AN ORGANIZED HEALTH CARE EDUCATION/TRAINING PROGRAM

## 2020-02-12 PROCEDURE — G8417 CALC BMI ABV UP PARAM F/U: HCPCS | Performed by: STUDENT IN AN ORGANIZED HEALTH CARE EDUCATION/TRAINING PROGRAM

## 2020-02-12 PROCEDURE — G8482 FLU IMMUNIZE ORDER/ADMIN: HCPCS | Performed by: STUDENT IN AN ORGANIZED HEALTH CARE EDUCATION/TRAINING PROGRAM

## 2020-02-12 PROCEDURE — 3017F COLORECTAL CA SCREEN DOC REV: CPT | Performed by: STUDENT IN AN ORGANIZED HEALTH CARE EDUCATION/TRAINING PROGRAM

## 2020-02-12 PROCEDURE — G8427 DOCREV CUR MEDS BY ELIG CLIN: HCPCS | Performed by: STUDENT IN AN ORGANIZED HEALTH CARE EDUCATION/TRAINING PROGRAM

## 2020-02-12 PROCEDURE — 99213 OFFICE O/P EST LOW 20 MIN: CPT | Performed by: STUDENT IN AN ORGANIZED HEALTH CARE EDUCATION/TRAINING PROGRAM

## 2020-02-12 RX ORDER — CLINDAMYCIN HYDROCHLORIDE 300 MG/1
300 CAPSULE ORAL 2 TIMES DAILY
Qty: 14 CAPSULE | Refills: 0 | Status: SHIPPED | OUTPATIENT
Start: 2020-02-12 | End: 2020-02-19

## 2020-02-12 NOTE — PROGRESS NOTES
S: 62 y.o. female here for ventral hernia drainage from scab removal.  Patient has history of diverticular disease, he is S/P open surgery several years ago with secondary intention for closure. Patient has had a large vertical scar since then and a little bit of bulging. Patient denies any pain from the bulging. She reports that from time to time she developed a scab on the scar and she scratches it off. This time after scratching it off it started draining small blood and yellowish fluid. She denies visualizing any pus, she denies any pain. Has tried Bactroban in the past with some improvement, however patient and daughter are concerned that this might be infection. Patient denies any fever or systemic symptoms. O: VS: BP (!) 106/56 (Site: Left Upper Arm, Position: Sitting, Cuff Size: Large Adult)   Pulse 64   Temp 98 °F (36.7 °C) (Oral)   Ht 5' (1.524 m)   Wt 189 lb (85.7 kg)   SpO2 91%   BMI 36.91 kg/m²    General: NAD, alert and interacting appropriately. CV:  RRR, no gallops, rubs, or murmurs    Resp: CTAB   Abd: Soft, nontender, ventral bulging, non-tender to palpation, small dried bloody drainage from granulation tissue, no signs of cellulitis, no erythema, no pus draining or actively drainable   Ext: No edema    Impression: Ventral hernia drainage from scab removal, no active signs of infection. Plan:  Bactroban  Continue to monitor    Attending Physician Statement  I have discussed the case, including pertinent history and exam findings with the resident. I agree with the documented assessment and plan.

## 2020-02-12 NOTE — PROGRESS NOTES
1/13/2012    Hernia     History of blood transfusion approx 05/2017    History of 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    Osteoarthritis     severe    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HCC)     PCWP mean:26.0 PA mean: 24.00; denies any recent issues    Pulmonary hypertension (HCC)     ventricular diastolic function consistent with abnormal relaxation (stage I)    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, f/u w/ PCP    Rhabdomyolysis 5/9/2011    Steroid-induced avascular necrosis of shoulder ([address]     Right    Tibia fracture 7/10       Past Surgical History:        Procedure Laterality Date    ABDOMEN SURGERY  2008    ischemic colitis    COLONOSCOPY  2008    healed colitis    COLONOSCOPY  04/11/2014    COLONOSCOPY  11/23/2015    severe colitis    COLONOSCOPY  10/24/2016    COLONOSCOPY  07/26/2017    ECHO COMPL W DOP COLOR FLOW  8/16/2015         ECHO COMPLETE  4/22/2013         FRACTURE SURGERY  2010    Tibial right    HEMORRHOID SURGERY  12/08/2016    KYPHOSIS SURGERY      For comp. fx T10    LUMBAR DISC SURGERY      OTHER SURGICAL HISTORY  11/1/11    removal r leg external fixator    TIBIA / FIBIA LENGTHENING      application TSF  0/7/14    UPPER GASTROINTESTINAL ENDOSCOPY  1/4/2016    UPPER GASTROINTESTINAL ENDOSCOPY  07/26/2017       Allergies:    Patient has no known allergies.     Social History:   Social History     Socioeconomic History    Marital status:      Spouse name: Not on file    Number of children: Not on file    Years of education: Not on file    Highest education level: Not on file   Occupational History    Occupation: disability -[employer] Financial resource strain: Not on file    Food insecurity:     Worry: Not on file     Inability: Not on file    Transportation needs:     Medical: Not on file     Non-medical: Not on file   Tobacco Use    Smoking status: Former Smoker     Packs/day: 0.75     Years: 25.00     Pack years: 18.75     Types: Cigarettes     Last attempt to quit: 9/10/1996     Years since quittin.4    Smokeless tobacco: Never Used   Substance and Sexual Activity    Alcohol use: No     Alcohol/week: 0.0 standard drinks     Comment: drinks mountain dew and clear pops. \"i drink alot of pop\" maybe 2 liters in a day.  Drug use: No     Comment:  coccaine    Sexual activity: Not Currently   Lifestyle    Physical activity:     Days per week: Not on file     Minutes per session: Not on file    Stress: Not on file   Relationships    Social connections:     Talks on phone: Not on file     Gets together: Not on file     Attends Mosque service: Not on file     Active member of club or organization: Not on file     Attends meetings of clubs or organizations: Not on file     Relationship status: Not on file    Intimate partner violence:     Fear of current or ex partner: Not on file     Emotionally abused: Not on file     Physically abused: Not on file     Forced sexual activity: Not on file   Other Topics Concern    Not on file   Social History Narrative    1 child, 2 step children,  for 20 years. Family History:       Problem Relation Age of Onset    Diabetes Mother     Arthritis Mother     High Blood Pressure Mother     Arthritis Father     High Blood Pressure Father     Diabetes Father     High Blood Pressure Sister     Alcohol Abuse Sister     Substance Abuse Brother     Substance Abuse Sister     Coronary Art Dis Neg Hx        Reviewof Systems:   Review of Systems   Constitutional: Negative for fever. Respiratory: Negative for shortness of breath and wheezing.     Cardiovascular: Negative for chest pain and times daily 30 tablet 3    albuterol sulfate HFA (PROVENTIL HFA) 108 (90 Base) MCG/ACT inhaler Inhale 2 puffs into the lungs every 6 hours as needed for Wheezing or Shortness of Breath 1 Inhaler 3    amLODIPine (NORVASC) 5 MG tablet Take 1 tablet by mouth daily 30 tablet 5    predniSONE (DELTASONE) 5 MG tablet Take 1 tablet by mouth daily 30 tablet 5    famotidine (PEPCID) 40 MG tablet Take 1 tablet by mouth nightly 30 tablet 5    oxyCODONE-acetaminophen (ENDOCET) 7.5-325 MG per tablet Take 1 tablet by mouth every 6 hours as needed for Pain for up to 30 days. Intended supply: 30 days 120 tablet 0    BREO ELLIPTA 100-25 MCG/INH AEPB inhaler Inhale 1 puff into the lungs daily 28 each 5    promethazine-codeine (PHENERGAN WITH CODEINE) 6.25-10 MG/5ML syrup Take 10 mLs by mouth 4 times daily as needed for Cough for up to 90 days. 473 mL 2    levothyroxine (SYNTHROID) 75 MCG tablet Take 1 tablet by mouth daily 30 tablet 5    metoprolol succinate (TOPROL XL) 50 MG extended release tablet TAKE ONE TABLET EVERY DAY WITH THE 100MG 90 tablet 3    ferrous sulfate 325 (65 Fe) MG tablet Take 1 tablet by mouth 2 times daily 60 tablet 5    ipratropium-albuterol (DUONEB) 0.5-2.5 (3) MG/3ML SOLN nebulizer solution Inhale 3 mLs into the lungs every 6 hours as needed for Shortness of Breath 120 vial 3    potassium chloride (KLOR-CON M) 20 MEQ TBCR extended release tablet Take 1 tablet by mouth daily (with breakfast) 30 tablet 2    Omega 3 1000 MG CAPS Take 1 each by mouth daily 90 capsule 1    Menthol, Topical Analgesic, (BIOFREEZE) 4 % GEL Apply BID to affected areas 150 mL 5    OXYGEN 4 L/min by Nasal route as needed. BMS      Zinc Oxide 10 % OINT Apply BID to affected area (Patient not taking: Reported on 2/12/2020) 85 g 5     No current facility-administered medications for this visit.          Sada Ruiz MD     Electronically signed by Sada Ruiz MD on 2/16/2020 at 10:58 AM

## 2020-02-16 ASSESSMENT — ENCOUNTER SYMPTOMS
ABDOMINAL PAIN: 0
SHORTNESS OF BREATH: 0
WHEEZING: 0

## 2020-02-27 ENCOUNTER — OFFICE VISIT (OUTPATIENT)
Dept: PAIN MANAGEMENT | Age: 65
End: 2020-02-27
Payer: COMMERCIAL

## 2020-02-27 VITALS
RESPIRATION RATE: 18 BRPM | BODY MASS INDEX: 37.11 KG/M2 | SYSTOLIC BLOOD PRESSURE: 124 MMHG | WEIGHT: 189 LBS | OXYGEN SATURATION: 96 % | DIASTOLIC BLOOD PRESSURE: 72 MMHG | TEMPERATURE: 98.6 F | HEART RATE: 64 BPM | HEIGHT: 60 IN

## 2020-02-27 PROCEDURE — G8427 DOCREV CUR MEDS BY ELIG CLIN: HCPCS | Performed by: ANESTHESIOLOGY

## 2020-02-27 PROCEDURE — 3017F COLORECTAL CA SCREEN DOC REV: CPT | Performed by: ANESTHESIOLOGY

## 2020-02-27 PROCEDURE — 99203 OFFICE O/P NEW LOW 30 MIN: CPT | Performed by: ANESTHESIOLOGY

## 2020-02-27 PROCEDURE — 1036F TOBACCO NON-USER: CPT | Performed by: ANESTHESIOLOGY

## 2020-02-27 PROCEDURE — G8482 FLU IMMUNIZE ORDER/ADMIN: HCPCS | Performed by: ANESTHESIOLOGY

## 2020-02-27 PROCEDURE — G8417 CALC BMI ABV UP PARAM F/U: HCPCS | Performed by: ANESTHESIOLOGY

## 2020-02-27 NOTE — PROGRESS NOTES
Patient:  Erika Forman,  1955  Date of Service:  20        Patient presents with complaints of all over body back seems to be worse  pain that started 10 years ago and has been getting worse. She states the pain began following sarcoidosis and she was on prednisone  stated she was taking 80 mg at one time before. .. that seems to be when all her problems started     Pain is constant and is described as aching, throbbing, shooting, stabbing, sharp, numb, miserable and unbearable. She rates the pain as a 10/10 on her worst day , 7/10 on her best day, and a 8/10 on average on the VAS scale. Pain does radiate to both lower extremities. She  has numbness of the feet. Alleviating factors include: rest.  Aggravating factors include:  movement, sitting. She states that the pain does keep her from sleeping at night. She took her last dose of Percocet      She is not on NSAIDS and  is not on anticoagulation medications to include Eliquis and is managed by  [facility] . Previous treatments: Physical Therapy and medications. .      Personal Expectations from this treatment: increase activity and decrease pain    /72   Pulse 64   Temp 98.6 °F (37 °C)   Resp 18   Ht 5' (1.524 m)   Wt 189 lb (85.7 kg)   SpO2 96%   BMI 36.91 kg/m²     No LMP recorded.  Patient is postmenopausal.

## 2020-02-27 NOTE — PROGRESS NOTES
knee: 5/2018: Impression   ALERT:  THIS IS AN ABNORMAL REPORT   1. Limited evaluation secondary to improper technique in orientation. Recommend repeat study with proper orientation at level with the joint   spaces. 2. Findings still remain concerning for moderate to moderately severe   medial and lateral and mild to moderate tricompartmental   osteoarthritis. 3. A moderate to large joint effusion may be present although this   evaluation is also limited by the improper orientation and technique. 4. Extensive callus formation traversing an age-indeterminate fracture   proximal fibula with persistent fracture line lucency         X ray of the right knee: 2/2018:  FINDINGS: Healed fracture deformity of the proximal right fibular   diaphysis similar to previously study. Healed fracture deformity of   the proximal right tibial metadiaphysis associated with presence of a   single horizontal metallic screw similar to previously study.       Moderate femorotibial joint space narrowing medially. Moderate size   osteophytes arising from femoral condyles, tibial plateaus on dorsal   aspect of the patella are present. There is a moderate size to large   size joint effusion. Subtle lucencies of the visualized right tibial   mid diaphysis related to previously removed screw tract. Soft tissues   are unremarkable.           Impression       1. No significant interval change since prior study August 17, 2016.            Past Medical History:   Diagnosis Date    A-fib [facility])     follows with Dr Asher Certain Abnormal mammogram 2010    Acute on chronic respiratory failure ([address] 5/5/2017    Anemia due to chronic illness     Ankle fracture, left 2008    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[address] 3/26/2007    Atrial fibrillation (HCC)     Backache     Benign hypertension     Chronic back pain     Chronic kidney disease     stage 2    Chronic pain disorder     Compression fracture of thoracic vertebra (HCC)     T10    times daily 2/4/20  Yes Brenda Grullon MD   RESTASIS 0.05 % ophthalmic emulsion  1/21/20  Yes Historical Provider, MD   metoprolol succinate (TOPROL XL) 100 MG extended release tablet  1/17/20  Yes Historical Provider, MD   escitalopram (LEXAPRO) 10 MG tablet Take 1 tablet by mouth daily 2/3/20  Yes Brenda Grullon MD   desmopressin (DDAVP) 0.1 MG tablet Take 2 tablets by mouth 2 times daily 2/3/20  Yes Brenda Grullon MD   albuterol sulfate HFA (PROVENTIL HFA) 108 (90 Base) MCG/ACT inhaler Inhale 2 puffs into the lungs every 6 hours as needed for Wheezing or Shortness of Breath 2/3/20  Yes Brenda Grullon MD   amLODIPine (NORVASC) 5 MG tablet Take 1 tablet by mouth daily 2/3/20  Yes Brenda Grullon MD   predniSONE (DELTASONE) 5 MG tablet Take 1 tablet by mouth daily 2/3/20  Yes Brenda Grullon MD   famotidine (PEPCID) 40 MG tablet Take 1 tablet by mouth nightly 2/3/20  Yes Brenda Grullon MD   oxyCODONE-acetaminophen (ENDOCET) 7.5-325 MG per tablet Take 1 tablet by mouth every 6 hours as needed for Pain for up to 30 days. Intended supply: 30 days  Patient taking differently: Take 1 tablet by mouth every 6 hours as needed for Pain. Intended supply: 30 days patient taking 10.325mg 2/3/20 3/4/20 Yes Brenda Grullon MD   BREO ELLIPTA 100-25 MCG/INH AEPB inhaler Inhale 1 puff into the lungs daily 1/17/20  Yes Brenda Grullon MD   promethazine-codeine [facility] WITH CODEINE) 6.25-10 MG/5ML syrup Take 10 mLs by mouth 4 times daily as needed for Cough for up to 90 days.  1/6/20 4/5/20 Yes Brenda Grullon MD   levothyroxine (SYNTHROID) 75 MCG tablet Take 1 tablet by mouth daily 11/21/19  Yes Brenda Grullon MD   metoprolol succinate (TOPROL XL) 50 MG extended release tablet TAKE ONE TABLET EVERY DAY WITH THE 100MG 11/14/19  Yes Monica Ramsay MD   ferrous sulfate 325 (65 Fe) MG tablet Take 1 tablet by mouth 2 times daily 11/7/19  Yes Sherol Blank, MD   ipratropium-albuterol (DUONEB) 0.5-2.5 (3) MG/3ML SOLN nebulizer solution Inhale 3 mLs into the lungs every 6 hours as needed for Shortness of Breath 10/28/19  Yes Misael Lott MD   potassium chloride (KLOR-CON M) 20 MEQ TBCR extended release tablet Take 1 tablet by mouth daily (with breakfast) 19  Yes Michael Sender, DO   Omega 3 1000 MG CAPS Take 1 each by mouth daily 19  Yes Misael Lott MD   Zinc Oxide 10 % OINT Apply BID to affected area 19  Yes Misael Lott MD   Menthol, Topical Analgesic, (BIOFREEZE) 4 % GEL Apply BID to affected areas 18  Yes Misael Lott MD   OXYGEN 4 L/min by Nasal route as needed. BMS   Yes Historical Provider, MD       No Known Allergies    Social History     Socioeconomic History    Marital status:      Spouse name: Not on file    Number of children: Not on file    Years of education: Not on file    Highest education level: Not on file   Occupational History    Occupation: disability -[employer]   Social Needs    Financial resource strain: Not on file    Food insecurity:     Worry: Not on file     Inability: Not on file    Transportation needs:     Medical: Not on file     Non-medical: Not on file   Tobacco Use    Smoking status: Former Smoker     Packs/day: 0.75     Years: 25.00     Pack years: 18.75     Types: Cigarettes     Last attempt to quit: 9/10/1996     Years since quittin.4    Smokeless tobacco: Never Used   Substance and Sexual Activity    Alcohol use: No     Alcohol/week: 0.0 standard drinks     Comment: drinks mountain dew and clear pops. \"i drink alot of pop\" maybe 2 liters in a day.     Drug use: No     Comment:  coccaine    Sexual activity: Not Currently   Lifestyle    Physical activity:     Days per week: Not on file     Minutes per session: Not on file    Stress: Not on file   Relationships    Social connections:     Talks on phone: Not on file     Gets together: Not on file     Attends Hoahaoism service: Not on file     Active member of club or organization: Not on file medication and procedure side effects.     Controlled Substances Monitoring:     [place] reviewed    Jax Roberts MD    CC:    Ruth Ann Ndiaye MD  [address] [facility] [id number], [id number] [facility]

## 2020-03-02 RX ORDER — PROMETHAZINE HYDROCHLORIDE AND CODEINE PHOSPHATE 6.25; 1 MG/5ML; MG/5ML
10 SYRUP ORAL 4 TIMES DAILY PRN
Qty: 473 ML | Refills: 2 | Status: SHIPPED
Start: 2020-03-02 | End: 2020-04-16 | Stop reason: SDUPTHER

## 2020-03-02 RX ORDER — OXYCODONE AND ACETAMINOPHEN 7.5; 325 MG/1; MG/1
1 TABLET ORAL EVERY 6 HOURS PRN
Qty: 120 TABLET | Refills: 0 | Status: SHIPPED
Start: 2020-03-02 | End: 2020-03-30 | Stop reason: SDUPTHER

## 2020-03-02 RX ORDER — OMEGA-3 FATTY ACIDS/FISH OIL 300-1000MG
1 CAPSULE ORAL DAILY
Qty: 90 CAPSULE | Refills: 1 | Status: SHIPPED
Start: 2020-03-02 | End: 2020-08-25 | Stop reason: SDUPTHER

## 2020-03-30 ENCOUNTER — TELEPHONE (OUTPATIENT)
Dept: FAMILY MEDICINE CLINIC | Age: 65
End: 2020-03-30

## 2020-03-30 RX ORDER — OXYCODONE AND ACETAMINOPHEN 7.5; 325 MG/1; MG/1
1 TABLET ORAL EVERY 6 HOURS PRN
Qty: 120 TABLET | Refills: 0 | Status: SHIPPED
Start: 2020-03-30 | End: 2020-04-28 | Stop reason: SDUPTHER

## 2020-04-03 RX ORDER — CYCLOSPORINE 0.5 MG/ML
1 EMULSION OPHTHALMIC EVERY 12 HOURS
Qty: 1 VIAL | Refills: 2 | Status: SHIPPED
Start: 2020-04-03 | End: 2020-11-19 | Stop reason: SDUPTHER

## 2020-04-09 ENCOUNTER — TELEPHONE (OUTPATIENT)
Dept: FAMILY MEDICINE CLINIC | Age: 65
End: 2020-04-09

## 2020-04-09 NOTE — TELEPHONE ENCOUNTER
The patient called for a different eye drop to be called in. She states it is a Neomycin drop that helps her eyes.    There was an eye drop ordered in December   Please advise

## 2020-04-14 ENCOUNTER — TELEPHONE (OUTPATIENT)
Dept: FAMILY MEDICINE CLINIC | Age: 65
End: 2020-04-14

## 2020-04-14 RX ORDER — POLYMYXIN B SULFATE AND TRIMETHOPRIM 1; 10000 MG/ML; [USP'U]/ML
1 SOLUTION OPHTHALMIC EVERY 4 HOURS
Qty: 10 ML | Refills: 0 | Status: SHIPPED
Start: 2020-04-14 | End: 2020-06-08 | Stop reason: SDUPTHER

## 2020-04-14 NOTE — TELEPHONE ENCOUNTER
I re-ordered these, but they are only for an eye infection. She has restasis drops for chronic eye dryness and irritation. The polytrim drops should only be used if she has pain and thick discharge from the eyes.

## 2020-04-16 RX ORDER — PROMETHAZINE HYDROCHLORIDE AND CODEINE PHOSPHATE 6.25; 1 MG/5ML; MG/5ML
10 SYRUP ORAL 3 TIMES DAILY PRN
Qty: 473 ML | Refills: 1 | Status: SHIPPED
Start: 2020-04-16 | End: 2020-05-11 | Stop reason: SDUPTHER

## 2020-04-28 RX ORDER — OXYCODONE AND ACETAMINOPHEN 7.5; 325 MG/1; MG/1
1 TABLET ORAL EVERY 6 HOURS PRN
Qty: 120 TABLET | Refills: 0 | Status: SHIPPED
Start: 2020-04-28 | End: 2020-05-26 | Stop reason: SDUPTHER

## 2020-05-11 ENCOUNTER — VIRTUAL VISIT (OUTPATIENT)
Dept: FAMILY MEDICINE CLINIC | Age: 65
End: 2020-05-11
Payer: COMMERCIAL

## 2020-05-11 VITALS
BODY MASS INDEX: 41.99 KG/M2 | OXYGEN SATURATION: 95 % | WEIGHT: 215 LBS | DIASTOLIC BLOOD PRESSURE: 82 MMHG | HEART RATE: 81 BPM | SYSTOLIC BLOOD PRESSURE: 134 MMHG

## 2020-05-11 PROBLEM — F11.90 CHRONIC, CONTINUOUS USE OF OPIOIDS: Chronic | Status: ACTIVE | Noted: 2020-05-11

## 2020-05-11 PROCEDURE — 99214 OFFICE O/P EST MOD 30 MIN: CPT | Performed by: FAMILY MEDICINE

## 2020-05-11 RX ORDER — PROMETHAZINE HYDROCHLORIDE AND CODEINE PHOSPHATE 6.25; 1 MG/5ML; MG/5ML
10 SYRUP ORAL 3 TIMES DAILY PRN
Qty: 473 ML | Refills: 2 | Status: SHIPPED
Start: 2020-05-11 | End: 2020-06-26 | Stop reason: SDUPTHER

## 2020-05-11 RX ORDER — LEVOTHYROXINE SODIUM 0.07 MG/1
75 TABLET ORAL DAILY
Qty: 30 TABLET | Refills: 5 | Status: SHIPPED
Start: 2020-05-11 | End: 2020-11-13 | Stop reason: SDUPTHER

## 2020-05-11 RX ORDER — POTASSIUM CHLORIDE 20 MEQ/1
TABLET, EXTENDED RELEASE ORAL
COMMUNITY
Start: 2020-03-02 | End: 2020-05-11 | Stop reason: ALTCHOICE

## 2020-05-11 RX ORDER — ESCITALOPRAM OXALATE 10 MG/1
10 TABLET ORAL DAILY
Qty: 30 TABLET | Refills: 2 | Status: SHIPPED
Start: 2020-05-11 | End: 2020-08-11 | Stop reason: SDUPTHER

## 2020-05-11 RX ORDER — DESMOPRESSIN ACETATE 0.1 MG/ML
SOLUTION NASAL
COMMUNITY
Start: 2020-03-02 | End: 2020-06-23

## 2020-05-11 NOTE — PROGRESS NOTES
Matilde Galvez is a 62 y.o. female evaluated via telephone on 5/11/2020. Consent:  She and/or health care decision maker is aware that that she may receive a bill for this telephone service, depending on her insurance coverage, and has provided verbal consent to proceed: Yes    Discussion:  Discussed her chronic pain and she does seem to use more of her medication since the pandemic started. I advised her to guard against that and asked that she make her Percocet last a full month and her cough medicine last for 20 days. OARRS checked. Blood pressure 134/82, pulse 81, weight 215 lb (97.5 kg), SpO2 95 %, not currently breastfeeding.  (reported by patient)  She tells me that her stomach wound is still draining. She is using bactroban but it seems to be getting larger. Assessment/Plan:  Leandro Caldwell was seen today for pain and copd. Diagnoses and all orders for this visit:    Benign essential hypertension    Chronic combined systolic and diastolic heart failure (HCC)    Chronic respiratory failure with hypoxia (HCC)    COPD, moderate ([address]    Primary hypothyroidism    Diabetes insipidus, neurohypophyseal (HCC)    Chronic kidney disease, stage III (moderate) (HCC)    Chronic bilateral low back pain without sciatica    Anemia due to chronic illness    Chronic, continuous use of opioids    Sarcoidosis      Additional Plan:  Meds refilled. She will schedule video visit next month. She is to try and extend her pain and cough medication to last the allotted time. She will call if she is having new or worsening symptoms. I affirm this is a Patient Initiated Episode with a Patient who has not had a related appointment within my department in the past 7 days or scheduled within the next 24 hours. Patient identification was verified at the start of the visit.     Total Time: minutes: 21-30 minutes    Note: not billable if this call serves to triage the patient into an appointment for the relevant

## 2020-05-22 ENCOUNTER — TELEPHONE (OUTPATIENT)
Dept: FAMILY MEDICINE CLINIC | Age: 65
End: 2020-05-22

## 2020-05-22 NOTE — TELEPHONE ENCOUNTER
Patient phoned stated that she is having a lot of pain and is taking more pain pills then  Prescribed. Patient would like to see if doctor can prescribe her a higher dose of pain medication  because of her back pain.   Please advise  Thank you April

## 2020-05-26 RX ORDER — GABAPENTIN 100 MG/1
100 CAPSULE ORAL 2 TIMES DAILY
Qty: 180 CAPSULE | Refills: 1 | Status: SHIPPED
Start: 2020-05-26 | End: 2020-05-26

## 2020-05-26 RX ORDER — OXYCODONE AND ACETAMINOPHEN 7.5; 325 MG/1; MG/1
1 TABLET ORAL EVERY 6 HOURS PRN
Qty: 120 TABLET | Refills: 0 | Status: SHIPPED
Start: 2020-05-26 | End: 2020-06-17

## 2020-05-26 RX ORDER — CIMETIDINE 400 MG/1
400 TABLET, FILM COATED ORAL 2 TIMES DAILY
Qty: 180 TABLET | Refills: 3 | Status: SHIPPED
Start: 2020-05-26 | End: 2021-05-28 | Stop reason: SDUPTHER

## 2020-05-26 RX ORDER — GABAPENTIN 100 MG/1
100 CAPSULE ORAL NIGHTLY
Qty: 90 CAPSULE | Refills: 1 | Status: SHIPPED
Start: 2020-05-26 | End: 2020-06-23 | Stop reason: ALTCHOICE

## 2020-06-08 ENCOUNTER — VIRTUAL VISIT (OUTPATIENT)
Dept: FAMILY MEDICINE CLINIC | Age: 65
End: 2020-06-08
Payer: COMMERCIAL

## 2020-06-08 PROCEDURE — G8427 DOCREV CUR MEDS BY ELIG CLIN: HCPCS | Performed by: FAMILY MEDICINE

## 2020-06-08 PROCEDURE — 99214 OFFICE O/P EST MOD 30 MIN: CPT | Performed by: FAMILY MEDICINE

## 2020-06-08 PROCEDURE — 3017F COLORECTAL CA SCREEN DOC REV: CPT | Performed by: FAMILY MEDICINE

## 2020-06-08 RX ORDER — FERROUS SULFATE 325(65) MG
325 TABLET ORAL 2 TIMES DAILY
Qty: 60 TABLET | Refills: 5 | Status: SHIPPED
Start: 2020-06-08 | End: 2020-12-03 | Stop reason: SDUPTHER

## 2020-06-08 RX ORDER — METOPROLOL SUCCINATE 100 MG/1
100 TABLET, EXTENDED RELEASE ORAL DAILY
Qty: 90 TABLET | Refills: 3 | Status: SHIPPED
Start: 2020-06-08 | End: 2021-06-21 | Stop reason: SDUPTHER

## 2020-06-08 RX ORDER — POLYMYXIN B SULFATE AND TRIMETHOPRIM 1; 10000 MG/ML; [USP'U]/ML
1 SOLUTION OPHTHALMIC EVERY 4 HOURS
Qty: 10 ML | Refills: 0 | Status: SHIPPED | OUTPATIENT
Start: 2020-06-08 | End: 2020-06-18

## 2020-06-08 NOTE — PROGRESS NOTES
of the umbilicus demonstrates some granulation tissue but skin appears clear around it without evidence of purulence or infection. There is a midline hernia over previous surgical scars. Psych: normal    Most recent labs and imaging reviewed. Findings include:     N/A    Assessments:     Osmar Mas was seen today for hypertension. Diagnoses and all orders for this visit:    Benign essential hypertension    PAF (paroxysmal atrial fibrillation) (HCC) currently in NSR    Chronic combined systolic and diastolic heart failure (HCC)    Chronic respiratory failure with hypoxia (HCC)    COPD, moderate (HCC)    Primary hypothyroidism    Diabetes insipidus, neurohypophyseal (HCC)    Steroid-induced avascular necrosis of right shoulder (HCC)    Chronic kidney disease, stage III (moderate) (HCC)    Chronic, continuous use of opioids    Sarcoidosis    Recurrent major depressive disorder, in partial remission ([facility]    Mixed hyperlipidemia    Hypertensive pulmonary vascular disease        Plans:    Orders as above. Most of Mrs. Gina Muhammad medical problems are quite stable. She does have dry eyes and possible early conjunctivitis in the left eye. I will refill her polymyxin bacitracin eyedrops for 1 more attempt at clearance. She may need to see her eye doctor if this persists. As far as her skin, I think this looks reasonably clean and does appear to be healing. I have encouraged her to continue with the bacitracin and cover it adequately she may very well need to pad this a little bit more. I warned her that if things are rubbing on this even over a bandage it could cause it to not heal.  She has to wear adult diapers, and these may be the culprit. She is going to see if she cannot avoid direct pressure over this area in the future. Most of the visit was taken up with discussion of her chronic pain and the possible treatments. She was unable to tolerate the gabapentin, as she says it makes her shaky and feel funny.

## 2020-06-17 ENCOUNTER — TELEPHONE (OUTPATIENT)
Dept: FAMILY MEDICINE CLINIC | Age: 65
End: 2020-06-17

## 2020-06-17 RX ORDER — OXYCODONE AND ACETAMINOPHEN 10; 325 MG/1; MG/1
1 TABLET ORAL EVERY 6 HOURS PRN
Qty: 120 TABLET | Refills: 0 | Status: SHIPPED
Start: 2020-06-17 | End: 2020-07-24 | Stop reason: SDUPTHER

## 2020-06-23 ENCOUNTER — VIRTUAL VISIT (OUTPATIENT)
Dept: CARDIOLOGY CLINIC | Age: 65
End: 2020-06-23
Payer: COMMERCIAL

## 2020-06-23 PROCEDURE — 99442 PR PHYS/QHP TELEPHONE EVALUATION 11-20 MIN: CPT | Performed by: INTERNAL MEDICINE

## 2020-06-23 PROCEDURE — G8417 CALC BMI ABV UP PARAM F/U: HCPCS | Performed by: INTERNAL MEDICINE

## 2020-06-23 PROCEDURE — G8427 DOCREV CUR MEDS BY ELIG CLIN: HCPCS | Performed by: INTERNAL MEDICINE

## 2020-06-23 PROCEDURE — 3017F COLORECTAL CA SCREEN DOC REV: CPT | Performed by: INTERNAL MEDICINE

## 2020-06-23 PROCEDURE — 1036F TOBACCO NON-USER: CPT | Performed by: INTERNAL MEDICINE

## 2020-06-23 NOTE — PROGRESS NOTES
pneumonia. Patchy infiltrates in both lung fields. 15. Hospitalized 09/02/19 after a fall. Had ankle edema. No fractures. 16.        hospitalized     11/11/2019 through 11/14/2019 after presenting with chest pain and coughing.  sodium of 125  17. OP cardiac assessment 12/18/2019 O2 per nasal cannula. Off anticoagulation due to epistaxis       Review of Systems:  Constitutional: negative for fever and chills  Respiratory: negative for cough and hemoptysis  Cardiovascular:   Gastrointestinal: negative for abdominal pain, diarrhea, nausea and vomiting  Genitourinary:negative for dysuria and hematuria  Derm: negative for rash and skin lesion(s)  Neurological: negative for seizures and tremors  Endocrine: negative for diabetic symptoms including polydipsia and polyuria  Musculoskeletal: negative for CTD  Psychiatric: negative for psychosis and major depression    Patient reported BP today 138/81. Weight 208 pounds. It is likely that the patient's no severe lung disease is primarily responsible for her dyspnea. Medications are reviewed today and no changes made. She is encouraged to remain on CVA prophylaxis. Issues regarding epistaxis should probably be assessed by ENT specialty. She is asked to contact Dr. Janeth Jacobsen regarding this. She will have cardiac follow-up in the next 3 months. At completion of today's visit, medications include the following:    Current Outpatient Medications:     CIMETIDINE 200 PO, Take 400 mg of ampicillin by mouth 2 times daily, Disp: , Rfl:     oxyCODONE-acetaminophen (ENDOCET)  MG per tablet, Take 1 tablet by mouth every 6 hours as needed for Pain for up to 30 days.  Intended supply: 30 days, Disp: 120 tablet, Rfl: 0    ferrous sulfate (IRON 325) 325 (65 Fe) MG tablet, Take 1 tablet by mouth 2 times daily, Disp: 60 tablet, Rfl: 5    metoprolol succinate (TOPROL XL) 100 MG extended release tablet, Take 1 tablet by mouth daily, Disp: 90 tablet, Rfl: 3    mupirocin utilizing computer voice recognition software. Every effort has been made to ensure accuracy, however; inadvertent computerized transcription errors may be present. --Sandor Rivera MD on 6/23/2020 at 4:07 PM    An electronic signature was used to authenticate this note.

## 2020-06-26 RX ORDER — PROMETHAZINE HYDROCHLORIDE AND CODEINE PHOSPHATE 6.25; 1 MG/5ML; MG/5ML
10 SYRUP ORAL 3 TIMES DAILY PRN
Qty: 473 ML | Refills: 2 | Status: SHIPPED
Start: 2020-06-26 | End: 2020-08-11 | Stop reason: SDUPTHER

## 2020-06-29 ENCOUNTER — TELEPHONE (OUTPATIENT)
Dept: FAMILY MEDICINE CLINIC | Age: 65
End: 2020-06-29

## 2020-06-29 NOTE — TELEPHONE ENCOUNTER
The patient went to Dr Flakito Jones for virtual visit and states she is to have a referral for ENT for Epistaxis.   Please place referral if applicable

## 2020-07-01 ENCOUNTER — TELEPHONE (OUTPATIENT)
Dept: ADMINISTRATIVE | Age: 65
End: 2020-07-01

## 2020-07-13 ENCOUNTER — TELEPHONE (OUTPATIENT)
Dept: FAMILY MEDICINE CLINIC | Age: 65
End: 2020-07-13

## 2020-07-13 NOTE — TELEPHONE ENCOUNTER
Please tell them that the blood thinner is being given to prevent stroke, so we would not recommend stopping it unless there is significant bleeding. She said that the bleeding from the hemorrhoids was a small amount.

## 2020-07-13 NOTE — TELEPHONE ENCOUNTER
Patients  Lauren Elmo called stated he received a message to call back to schedule with Dr. Davis Elias for nose bleeds and patient is on Eloquis needs an appt soon, Lauren Elmo can be reached at [phone] today from now until 3pm or on any other day from 12-1pm.

## 2020-07-13 NOTE — TELEPHONE ENCOUNTER
Patient called having rectal bleeding x 2 days  With Bms only describes as light bleeding with light bred blood, HX of Hemorid. Got a new exercise bike and thinks that that may be causing a problem.

## 2020-07-13 NOTE — TELEPHONE ENCOUNTER
Patients  needs apt around work schedule. Offered 7/29 915. Unable to take am apt. Apt 8/6/20 at 3:15. Patient notified of scheduling instructions. Patients  said he's taking her off eliquis because she's having rectal bleeding too.  Advised  they need to speak with prescribing dr first. He said he knows what he's doing and doesn't need to call .

## 2020-07-13 NOTE — TELEPHONE ENCOUNTER
Spoke with patient's  who is very frustrated. He feels like it is the blood thinner causing the nose bleeds and now the rectal bleeding. The last time there was bleeding issues the patient's  had her hold  It for 3 months and she had no issues up until now. He does not want to schedule an appointment at this time but would like to know if she cold hold the Elaquis for a short period of time.

## 2020-07-21 RX ORDER — METOPROLOL SUCCINATE 50 MG/1
TABLET, EXTENDED RELEASE ORAL
Qty: 90 TABLET | Refills: 3 | Status: SHIPPED
Start: 2020-07-21 | End: 2020-12-31 | Stop reason: SDUPTHER

## 2020-07-21 NOTE — TELEPHONE ENCOUNTER
Last Appointment:  2/3/2020  Future Appointments   Date Time Provider Mauri Sanford   8/6/2020  3:15 PM Luis Bull, [address] [facility]

## 2020-07-24 RX ORDER — OXYCODONE AND ACETAMINOPHEN 10; 325 MG/1; MG/1
1 TABLET ORAL EVERY 6 HOURS PRN
Qty: 120 TABLET | Refills: 0 | Status: SHIPPED
Start: 2020-07-24 | End: 2020-08-21 | Stop reason: SDUPTHER

## 2020-07-24 NOTE — TELEPHONE ENCOUNTER
Last Appointment:  2/3/2020  Future Appointments   Date Time Provider Mauri Sanford   8/6/2020  3:15 PM Ethan Mora, [address] [facility]

## 2020-07-24 NOTE — TELEPHONE ENCOUNTER
Controlled Substance Monitoring:    Acute and Chronic Pain Monitoring:   RX Monitoring 7/24/2020   Attestation -   Acute Pain Prescriptions -   Periodic Controlled Substance Monitoring Possible medication side effects, risk of tolerance/dependence & alternative treatments discussed. ;No signs of potential drug abuse or diversion identified. ;Assessed functional status. ;Obtaining appropriate analgesic effect of treatment.    Chronic Pain > 50 MEDD -   Chronic Pain > 80 MEDD -

## 2020-07-31 RX ORDER — FLUTICASONE FUROATE AND VILANTEROL TRIFENATATE 100; 25 UG/1; UG/1
1 POWDER RESPIRATORY (INHALATION) DAILY
Qty: 28 EACH | Refills: 5 | Status: SHIPPED
Start: 2020-07-31 | End: 2020-08-11 | Stop reason: SDUPTHER

## 2020-07-31 NOTE — TELEPHONE ENCOUNTER
Last Appointment:  2/3/2020  Future Appointments   Date Time Provider Mauri Sanford   8/6/2020  3:15 PM Zara Smalls, [address] [facility]

## 2020-08-03 ENCOUNTER — TELEPHONE (OUTPATIENT)
Dept: FAMILY MEDICINE CLINIC | Age: 65
End: 2020-08-03

## 2020-08-03 NOTE — TELEPHONE ENCOUNTER
Patient called in reporting she has a rash under her chin. It is red and itchy, not warm and nothing seeping . She states it is just bothersome and itchy  Patient would like to know what she can put on it.     She asked that I make sure I ask Dr Don Orellana what she can use

## 2020-08-03 NOTE — TELEPHONE ENCOUNTER
She may try hydrocortisone 1 % cream (OTC). She should use this twice a day for no more than one week. If it is getting worse, she should be seen. Also, she should consider how she might have gotten this. It might be a reaction to something she put on her chin or something that was touching her there. If she can think about what might have caused it, she could avoid that exposure in the future.

## 2020-08-06 ENCOUNTER — OFFICE VISIT (OUTPATIENT)
Dept: ENT CLINIC | Age: 65
End: 2020-08-06
Payer: COMMERCIAL

## 2020-08-06 VITALS — BODY MASS INDEX: 39.27 KG/M2 | WEIGHT: 200 LBS | TEMPERATURE: 98.1 F | HEIGHT: 60 IN

## 2020-08-06 PROCEDURE — 99203 OFFICE O/P NEW LOW 30 MIN: CPT | Performed by: OTOLARYNGOLOGY

## 2020-08-06 PROCEDURE — 1036F TOBACCO NON-USER: CPT | Performed by: OTOLARYNGOLOGY

## 2020-08-06 PROCEDURE — 3017F COLORECTAL CA SCREEN DOC REV: CPT | Performed by: OTOLARYNGOLOGY

## 2020-08-06 PROCEDURE — G8427 DOCREV CUR MEDS BY ELIG CLIN: HCPCS | Performed by: OTOLARYNGOLOGY

## 2020-08-06 PROCEDURE — G8417 CALC BMI ABV UP PARAM F/U: HCPCS | Performed by: OTOLARYNGOLOGY

## 2020-08-06 RX ORDER — ECHINACEA PURPUREA EXTRACT 125 MG
1 TABLET ORAL PRN
Qty: 1 BOTTLE | Refills: 3 | Status: SHIPPED
Start: 2020-08-06 | End: 2022-09-12

## 2020-08-06 ASSESSMENT — ENCOUNTER SYMPTOMS
EYE DISCHARGE: 0
COLOR CHANGE: 0
ALLERGIC/IMMUNOLOGIC NEGATIVE: 1
VOMITING: 0
SHORTNESS OF BREATH: 1
COUGH: 1
NAUSEA: 0
EYE REDNESS: 0
STRIDOR: 0

## 2020-08-06 NOTE — PROGRESS NOTES
Subjective:      Patient ID:  Steven Desouza is a 62 y.o. female. HPI:  Recurrent Epistaxis  The patientis a 62 y.o. female who presentes with epistaxis. The patientreports nosebleeds for several years. They usually occurbilaterally. Pt was was in the ER   was not cauterized on the bilateralside   was not packed on the bilateralside. This is not the patients first event of epistaxis. Prior therapy has included nothing additional.      Chronic O2? yes    There is not historyof easy bruising or bleeding. Pt is  on anticoagulationtherapy - Eliquis      Patient has Sarcoidosis. Otherepistaxis risk factors: dry air  Patient'smedications, allergies, past medical, surgical, social and family histories werereviewed and updated as appropriate. Review of Systems   Constitutional: Positive for activity change. Negative for chills, fatigue and fever. HENT: Positive for nosebleeds. Eyes: Negative for discharge and redness. Respiratory: Positive for cough and shortness of breath. Negative for stridor. Gastrointestinal: Negative for nausea and vomiting. Endocrine: Negative. Genitourinary: Negative. Musculoskeletal: Negative. Skin: Negative for color change and rash. Allergic/Immunologic: Negative. Neurological: Negative for dizziness, speech difficulty and headaches. Hematological: Negative. Psychiatric/Behavioral: Negative for agitation and confusion. All other systems reviewed and are negative. Objective:     Vitals:    08/06/20 1537   Temp: 98.1 °F (36.7 °C)       Physical Exam  Constitutional:       Appearance: Normal appearance. She is obese. HENT:      Head: Normocephalic and atraumatic. Right Ear: External ear normal.      Left Ear: External ear normal.      Nose:     Eyes:      Pupils: Pupils are equal, round, and reactive to light. Neck:      Musculoskeletal: Normal range of motion. Cardiovascular:      Rate and Rhythm: Normal rate. Pulmonary:      Comments: Increased work of breathing   Musculoskeletal: Normal range of motion. Skin:     General: Skin is warm and dry. Neurological:      General: No focal deficit present. Mental Status: She is alert. Assessment:          Diagnosis Orders   1. Epistaxis               Plan:      · Open Mouth and cover when sneezing, coughing  · No nose blowing for 2 weeks  · Nasal saline spray in each nostril 4-5 times a day  · Discussed techniques to avoid nasal trauma- trim nasal O2 prongs down, use face mask, put prongs in mouth, use of humidification   ·   Follow up prn                       Devyn Holland  1955    I have discussed the case, including pertinent history and exam findings with the resident. I have seen and examined the patient and the key elements of the encounter have been performed by me. I agree with the assessment, plan and orders as documented by the  resident              Remainder of medical problems as per  resident note. Patient seen and examined. Agree with above exam, assessment and plan.       Electronically signed by Karin Mckeon DO on 8/11/20 at 12:13 PM EDT

## 2020-08-11 RX ORDER — PROMETHAZINE HYDROCHLORIDE AND CODEINE PHOSPHATE 6.25; 1 MG/5ML; MG/5ML
7.5 SYRUP ORAL 3 TIMES DAILY PRN
Qty: 473 ML | Refills: 1 | Status: SHIPPED
Start: 2020-08-11 | End: 2020-09-17 | Stop reason: SDUPTHER

## 2020-08-11 RX ORDER — AMLODIPINE BESYLATE 5 MG/1
5 TABLET ORAL DAILY
Qty: 30 TABLET | Refills: 5 | Status: SHIPPED
Start: 2020-08-11 | End: 2021-02-08 | Stop reason: SDUPTHER

## 2020-08-11 RX ORDER — ESCITALOPRAM OXALATE 10 MG/1
10 TABLET ORAL DAILY
Qty: 30 TABLET | Refills: 2 | Status: SHIPPED
Start: 2020-08-11 | End: 2020-09-17 | Stop reason: SDUPTHER

## 2020-08-11 RX ORDER — FLUTICASONE FUROATE AND VILANTEROL TRIFENATATE 100; 25 UG/1; UG/1
1 POWDER RESPIRATORY (INHALATION) DAILY
Qty: 28 EACH | Refills: 5 | Status: SHIPPED
Start: 2020-08-11 | End: 2020-10-29 | Stop reason: SDUPTHER

## 2020-08-21 RX ORDER — OXYCODONE AND ACETAMINOPHEN 10; 325 MG/1; MG/1
1 TABLET ORAL EVERY 6 HOURS PRN
Qty: 120 TABLET | Refills: 0 | Status: SHIPPED
Start: 2020-08-21 | End: 2020-09-17 | Stop reason: SDUPTHER

## 2020-08-25 RX ORDER — OMEGA-3 FATTY ACIDS/FISH OIL 300-1000MG
1 CAPSULE ORAL DAILY
Qty: 90 CAPSULE | Refills: 1 | Status: SHIPPED
Start: 2020-08-25 | End: 2020-12-03 | Stop reason: SDUPTHER

## 2020-08-26 RX ORDER — OMEGA-3 FATTY ACIDS/FISH OIL 300-1000MG
1 CAPSULE ORAL DAILY
Qty: 90 CAPSULE | Refills: 1 | OUTPATIENT
Start: 2020-08-26

## 2020-09-03 RX ORDER — PREDNISONE 1 MG/1
5 TABLET ORAL DAILY
Qty: 30 TABLET | Refills: 5 | Status: SHIPPED
Start: 2020-09-03 | End: 2021-03-16 | Stop reason: SDUPTHER

## 2020-09-08 ENCOUNTER — TELEPHONE (OUTPATIENT)
Dept: FAMILY MEDICINE CLINIC | Age: 65
End: 2020-09-08

## 2020-09-08 RX ORDER — SULFAMETHOXAZOLE AND TRIMETHOPRIM 800; 160 MG/1; MG/1
1 TABLET ORAL 2 TIMES DAILY
Qty: 6 TABLET | Refills: 0 | Status: SHIPPED | OUTPATIENT
Start: 2020-09-08 | End: 2020-09-11

## 2020-09-17 RX ORDER — ESCITALOPRAM OXALATE 10 MG/1
20 TABLET ORAL DAILY
Qty: 60 TABLET | Refills: 0 | Status: SHIPPED
Start: 2020-09-17 | End: 2020-11-25 | Stop reason: SDUPTHER

## 2020-09-17 RX ORDER — OXYCODONE AND ACETAMINOPHEN 10; 325 MG/1; MG/1
1 TABLET ORAL EVERY 6 HOURS PRN
Qty: 120 TABLET | Refills: 0 | Status: SHIPPED
Start: 2020-09-17 | End: 2020-10-15

## 2020-09-17 RX ORDER — PROMETHAZINE HYDROCHLORIDE AND CODEINE PHOSPHATE 6.25; 1 MG/5ML; MG/5ML
7.5 SYRUP ORAL 3 TIMES DAILY PRN
Qty: 473 ML | Refills: 1 | Status: SHIPPED
Start: 2020-09-17 | End: 2020-10-29 | Stop reason: SDUPTHER

## 2020-09-17 NOTE — TELEPHONE ENCOUNTER
Pt would like her refills before noon if possible and her Lexapro dose raised to 20 mg. Thank you. Last Appointment:  6/8/2020  No future appointments.

## 2020-09-23 ENCOUNTER — TELEPHONE (OUTPATIENT)
Dept: FAMILY MEDICINE CLINIC | Age: 65
End: 2020-09-23

## 2020-09-23 NOTE — TELEPHONE ENCOUNTER
Patient advised and verbalized understanding. At this time her nose has stopped bleeding. She will stop the Eliquis.

## 2020-09-23 NOTE — TELEPHONE ENCOUNTER
She should pinch nose for 10 minutes (continuously), and keep her head tilted forward, not back. If no improvement, she needs to go to ER as she may need packing. She should stop eliquis for now.

## 2020-09-28 ENCOUNTER — VIRTUAL VISIT (OUTPATIENT)
Dept: FAMILY MEDICINE CLINIC | Age: 65
End: 2020-09-28
Payer: COMMERCIAL

## 2020-09-28 PROCEDURE — 99213 OFFICE O/P EST LOW 20 MIN: CPT | Performed by: FAMILY MEDICINE

## 2020-09-28 NOTE — PROGRESS NOTES
fibrillation) ([facility]) currently in NSR 11/08/2016    Mixed hyperlipidemia     Chronic kidney disease, stage III (moderate) ([facility]) 01/19/2016    Debility 06/14/2014    Bilateral hip pain 03/21/2011    Chronic back pain     Primary hypothyroidism     Nephrosclerosis     Diabetes insipidus, neurohypophyseal ([facility])     Sarcoidosis     Steroid-induced avascular necrosis of shoulder (HCC)     Anemia due to chronic illness     Hypertensive pulmonary vascular disease 09/19/2007    Benign essential hypertension 01/18/2007       Current Outpatient Medications on File Prior to Visit   Medication Sig Dispense Refill    oxyCODONE-acetaminophen (ENDOCET)  MG per tablet Take 1 tablet by mouth every 6 hours as needed for Pain for up to 30 days. Intended supply: 30 days 120 tablet 0    promethazine-codeine (PHENERGAN WITH CODEINE) 6.25-10 MG/5ML syrup Take 7.5 mLs by mouth 3 times daily as needed for Cough for up to 90 days.  473 mL 1    escitalopram (LEXAPRO) 10 MG tablet Take 2 tablets by mouth daily 60 tablet 0    predniSONE (DELTASONE) 5 MG tablet Take 1 tablet by mouth daily 30 tablet 5    Omega 3 1000 MG CAPS Take 1 each by mouth daily 90 capsule 1    amLODIPine (NORVASC) 5 MG tablet Take 1 tablet by mouth daily 30 tablet 5    BREO ELLIPTA 100-25 MCG/INH AEPB inhaler Inhale 1 puff into the lungs daily 28 each 5    sodium chloride (ALTAMIST SPRAY) 0.65 % nasal spray 1 spray by Nasal route as needed for Congestion 1 Bottle 3    metoprolol succinate (TOPROL XL) 50 MG extended release tablet TAKE ONE TABLET EVERY DAY WITH THE 100MG 90 tablet 3    ferrous sulfate (IRON 325) 325 (65 Fe) MG tablet Take 1 tablet by mouth 2 times daily 60 tablet 5    metoprolol succinate (TOPROL XL) 100 MG extended release tablet Take 1 tablet by mouth daily 90 tablet 3    mupirocin (BACTROBAN) 2 % ointment Apply topically daily 30 g 2    cimetidine (TAGAMET) 400 MG tablet Take 1 tablet by mouth 2 times daily 180 tablet 3    levothyroxine (SYNTHROID) 75 MCG tablet Take 1 tablet by mouth daily 30 tablet 5    cycloSPORINE (RESTASIS) 0.05 % ophthalmic emulsion Place 1 drop into both eyes every 12 hours 1 vial 2    desmopressin (DDAVP) 0.1 MG tablet Take 2 tablets by mouth 2 times daily 30 tablet 3    albuterol sulfate HFA (PROVENTIL HFA) 108 (90 Base) MCG/ACT inhaler Inhale 2 puffs into the lungs every 6 hours as needed for Wheezing or Shortness of Breath 1 Inhaler 3    Menthol, Topical Analgesic, (BIOFREEZE) 4 % GEL Apply BID to affected areas 150 mL 5    OXYGEN 4 L/min by Nasal route as needed. BMS      apixaban (ELIQUIS) 5 MG TABS tablet Take 1 tablet by mouth 2 times daily (Patient not taking: Reported on 2020) 60 tablet 5     No current facility-administered medications on file prior to visit. No Known Allergies    Social History     Tobacco Use    Smoking status: Former Smoker     Packs/day: 0.75     Years: 25.00     Pack years: 18.75     Types: Cigarettes     Start date:      Last attempt to quit: 9/10/1996     Years since quittin.0    Smokeless tobacco: Never Used   Substance Use Topics    Alcohol use: No     Alcohol/week: 0.0 standard drinks     Comment: drinks mountain dew and clear pops. \"i drink alot of pop\" maybe 2 liters in a day.  Drug use: No     Comment:  coccaine         Physical Exam:    General: normal  Respiratory: wears nasal O2, no distress  Musculoskeletal: Not Examined  Skin: normal  Psych: Appears drowsy, speech slightly impaired    Most recent labs and imaging reviewed. Findings include:     N/A    Assessments:     Prakash Mccall was seen today for medication check and pain.     Diagnoses and all orders for this visit:    Benign essential hypertension    PAF (paroxysmal atrial fibrillation) ([address] currently in NSR    Chronic combined systolic and diastolic heart failure (HCC)    Chronic respiratory failure with hypoxia ([address]    Primary hypothyroidism    Diabetes insipidus, neurohypophyseal ([facility] [address]    Steroid-induced avascular necrosis of right shoulder (HCC)    Chronic kidney disease, stage III (moderate) (HCC)    Sarcoidosis    Debility    Mixed hyperlipidemia    Recurrent major depressive disorder, in partial remission ([facility] [address]    Chronic bilateral low back pain without sciatica      Plans:    Orders as above. Encouraged her to get flu shot at pharmacy. Continue on all meds for now  Explained that I want to restart our tapering of her narcotics   Next refill of oxycodone will be be for 7.5 mg tablets #120  Continue to decrease codeine use  Labs ordered - she may obtain these here or at a lab of her choice    RTO in in 2 month(s) or sooner for any persistent, new, or worsening symptoms. Time spent: Greater than 25    Advised to please be adherent to the treatment plans discussed today, and please call with any questions or concerns, letting the office know of any reasons that the plans may not be followed. The risks of untreated conditions include worsening illness, injury, disability, and possibly, death. Please call if symptoms change in any way, worsen, or fail to completely resolve, as this could necessitate a change to treatment plans. Patient and/or caregiver expressed understanding. Indications and proper use of medication(s) reviewed. Potential side-effects and risks of medication(s) also explained. Patient and/or caregiver was instructed to call if any new symptoms develop prior to next visit. This visit was performed during the 4033 public health crisis and COVID-19 pandemic.   *Add 95 modifier to all Video Visits*

## 2020-10-12 ENCOUNTER — TELEPHONE (OUTPATIENT)
Dept: FAMILY MEDICINE CLINIC | Age: 65
End: 2020-10-12

## 2020-10-12 NOTE — TELEPHONE ENCOUNTER
Patient called  She is having a lot of clear mucus and sometimes  spots blood with bms. She states that she has a hemorrhoid that protrudes at times  And she would like to know if this is normal, please advise.

## 2020-10-13 RX ORDER — HYDROCORTISONE 25 MG/G
CREAM TOPICAL
Qty: 1 TUBE | Refills: 5 | Status: ON HOLD
Start: 2020-10-13 | End: 2022-07-18 | Stop reason: HOSPADM

## 2020-10-13 RX ORDER — DOCUSATE SODIUM 100 MG/1
100 CAPSULE, LIQUID FILLED ORAL 2 TIMES DAILY
Qty: 30 CAPSULE | Refills: 2 | Status: SHIPPED
Start: 2020-10-13 | End: 2020-12-03 | Stop reason: SDUPTHER

## 2020-10-13 NOTE — TELEPHONE ENCOUNTER
Patient advised and verbalized understanding. She need her rectal cream and stool softener refilled. Created refill encounter and routed to Dr. Lonnie Chatterjee.

## 2020-10-15 RX ORDER — OXYCODONE AND ACETAMINOPHEN 10; 325 MG/1; MG/1
1 TABLET ORAL EVERY 6 HOURS PRN
Qty: 120 TABLET | Refills: 0 | OUTPATIENT
Start: 2020-10-15 | End: 2020-11-14

## 2020-10-15 RX ORDER — OXYCODONE AND ACETAMINOPHEN 7.5; 325 MG/1; MG/1
1 TABLET ORAL EVERY 6 HOURS PRN
Qty: 120 TABLET | Refills: 0 | OUTPATIENT
Start: 2020-10-15 | End: 2020-11-14

## 2020-10-15 RX ORDER — OXYCODONE AND ACETAMINOPHEN 7.5; 325 MG/1; MG/1
1 TABLET ORAL EVERY 6 HOURS PRN
Qty: 120 TABLET | Refills: 0 | Status: SHIPPED
Start: 2020-10-15 | End: 2020-11-13 | Stop reason: SDUPTHER

## 2020-10-15 NOTE — TELEPHONE ENCOUNTER
Refill for narcotics sent with 7.5 mg dose as discussed at most recent visit. She should make appointment in next month.

## 2020-10-29 RX ORDER — PROMETHAZINE HYDROCHLORIDE AND CODEINE PHOSPHATE 6.25; 1 MG/5ML; MG/5ML
7.5 SYRUP ORAL 3 TIMES DAILY PRN
Qty: 473 ML | Refills: 1 | Status: SHIPPED
Start: 2020-10-29 | End: 2020-12-03 | Stop reason: SDUPTHER

## 2020-10-29 RX ORDER — DESMOPRESSIN ACETATE 0.1 MG/1
0.2 TABLET ORAL 2 TIMES DAILY
Qty: 30 TABLET | Refills: 3 | Status: SHIPPED
Start: 2020-10-29 | End: 2021-02-08 | Stop reason: SDUPTHER

## 2020-10-29 RX ORDER — FLUTICASONE FUROATE AND VILANTEROL TRIFENATATE 100; 25 UG/1; UG/1
1 POWDER RESPIRATORY (INHALATION) DAILY
Qty: 28 EACH | Refills: 5 | Status: SHIPPED
Start: 2020-10-29 | End: 2021-06-18 | Stop reason: SDUPTHER

## 2020-11-13 RX ORDER — LEVOTHYROXINE SODIUM 0.07 MG/1
75 TABLET ORAL DAILY
Qty: 30 TABLET | Refills: 5 | Status: SHIPPED
Start: 2020-11-13 | End: 2021-05-10 | Stop reason: SDUPTHER

## 2020-11-13 RX ORDER — OXYCODONE AND ACETAMINOPHEN 7.5; 325 MG/1; MG/1
1 TABLET ORAL EVERY 6 HOURS PRN
Qty: 120 TABLET | Refills: 0 | Status: SHIPPED
Start: 2020-11-13 | End: 2020-12-11 | Stop reason: SDUPTHER

## 2020-11-13 NOTE — TELEPHONE ENCOUNTER
Last Appointment:  9/28/2020  Future Appointments   Date Time Provider Mauri Sanford   12/3/2020  2:00 PM Zachery Leyden, MD [facility]

## 2020-11-19 RX ORDER — CYCLOSPORINE 0.5 MG/ML
1 EMULSION OPHTHALMIC EVERY 12 HOURS
Qty: 1 VIAL | Refills: 2 | Status: SHIPPED
Start: 2020-11-19 | End: 2022-07-08 | Stop reason: SDUPTHER

## 2020-11-19 NOTE — TELEPHONE ENCOUNTER
Last Appointment:  9/28/2020  Future Appointments   Date Time Provider Mauri Sanford   12/3/2020  2:00 PM [facility]

## 2020-11-25 RX ORDER — ESCITALOPRAM OXALATE 10 MG/1
20 TABLET ORAL DAILY
Qty: 60 TABLET | Refills: 0 | Status: SHIPPED
Start: 2020-11-25 | End: 2020-12-31 | Stop reason: SDUPTHER

## 2020-11-25 RX ORDER — GABAPENTIN 100 MG/1
100 CAPSULE ORAL NIGHTLY PRN
COMMUNITY
Start: 2020-11-16 | End: 2021-02-08

## 2020-11-25 RX ORDER — POTASSIUM CHLORIDE 20 MEQ/1
TABLET, EXTENDED RELEASE ORAL
COMMUNITY
Start: 2020-09-28 | End: 2020-12-21 | Stop reason: ALTCHOICE

## 2020-11-25 RX ORDER — PROMETHAZINE HYDROCHLORIDE AND CODEINE PHOSPHATE 6.25; 1 MG/5ML; MG/5ML
7.5 SYRUP ORAL 3 TIMES DAILY PRN
Qty: 473 ML | Refills: 1 | OUTPATIENT
Start: 2020-11-25 | End: 2021-02-23

## 2020-11-25 NOTE — TELEPHONE ENCOUNTER
Last Appointment:  9/28/2020  Future Appointments   Date Time Provider Muari Sanford   12/22/2020  1:00 PM MD Shabbir Quinn [facility]

## 2020-11-30 ENCOUNTER — TELEPHONE (OUTPATIENT)
Dept: FAMILY MEDICINE CLINIC | Age: 65
End: 2020-11-30

## 2020-11-30 NOTE — TELEPHONE ENCOUNTER
Patient states that she has clear liquid draining from her rectum. She has used the anusol but it has not been effective , please advise. Patient scheduled for PAP 12/22/2020.

## 2020-12-02 ENCOUNTER — TELEPHONE (OUTPATIENT)
Dept: FAMILY MEDICINE CLINIC | Age: 65
End: 2020-12-02

## 2020-12-02 DIAGNOSIS — Z12.31 ENCOUNTER FOR SCREENING MAMMOGRAM FOR MALIGNANT NEOPLASM OF BREAST: Primary | ICD-10-CM

## 2020-12-02 NOTE — TELEPHONE ENCOUNTER
Patient phoned and changed her appointment on the 12/21/20 and would like a referral for a mammogram   Please advise  Thank you April

## 2020-12-03 RX ORDER — PROMETHAZINE HYDROCHLORIDE AND CODEINE PHOSPHATE 6.25; 1 MG/5ML; MG/5ML
7.5 SYRUP ORAL 3 TIMES DAILY PRN
Qty: 473 ML | Refills: 1 | Status: SHIPPED
Start: 2020-12-03 | End: 2021-01-06 | Stop reason: SDUPTHER

## 2020-12-03 RX ORDER — FERROUS SULFATE 325(65) MG
325 TABLET ORAL 2 TIMES DAILY
Qty: 60 TABLET | Refills: 5 | Status: SHIPPED
Start: 2020-12-03 | End: 2021-02-12 | Stop reason: SDUPTHER

## 2020-12-03 RX ORDER — DOCUSATE SODIUM 100 MG/1
100 CAPSULE, LIQUID FILLED ORAL 2 TIMES DAILY
Qty: 30 CAPSULE | Refills: 2 | Status: SHIPPED
Start: 2020-12-03 | End: 2021-03-01 | Stop reason: SDUPTHER

## 2020-12-03 RX ORDER — OMEGA-3 FATTY ACIDS/FISH OIL 300-1000MG
1 CAPSULE ORAL DAILY
Qty: 90 CAPSULE | Refills: 1 | Status: SHIPPED
Start: 2020-12-03 | End: 2022-03-09 | Stop reason: SDUPTHER

## 2020-12-11 RX ORDER — OXYCODONE AND ACETAMINOPHEN 7.5; 325 MG/1; MG/1
1 TABLET ORAL EVERY 6 HOURS PRN
Qty: 120 TABLET | Refills: 0 | Status: SHIPPED
Start: 2020-12-11 | End: 2021-01-08 | Stop reason: SDUPTHER

## 2020-12-11 RX ORDER — OMEGA-3-ACID ETHYL ESTERS 1 G/1
CAPSULE, LIQUID FILLED ORAL
COMMUNITY
Start: 2020-12-03 | End: 2021-08-20 | Stop reason: SDUPTHER

## 2020-12-21 ENCOUNTER — OFFICE VISIT (OUTPATIENT)
Dept: FAMILY MEDICINE CLINIC | Age: 65
End: 2020-12-21
Payer: COMMERCIAL

## 2020-12-21 VITALS
OXYGEN SATURATION: 98 % | HEART RATE: 73 BPM | TEMPERATURE: 97.8 F | SYSTOLIC BLOOD PRESSURE: 94 MMHG | DIASTOLIC BLOOD PRESSURE: 58 MMHG | RESPIRATION RATE: 20 BRPM

## 2020-12-21 PROCEDURE — 99212 OFFICE O/P EST SF 10 MIN: CPT | Performed by: FAMILY MEDICINE

## 2020-12-21 PROCEDURE — G8484 FLU IMMUNIZE NO ADMIN: HCPCS | Performed by: FAMILY MEDICINE

## 2020-12-21 PROCEDURE — 99386 PREV VISIT NEW AGE 40-64: CPT | Performed by: FAMILY MEDICINE

## 2020-12-21 PROCEDURE — 99396 PREV VISIT EST AGE 40-64: CPT | Performed by: FAMILY MEDICINE

## 2020-12-21 NOTE — PROGRESS NOTES
Chief complaint : 60 year- old presents for well woman exam.     Present illness :    Postmenopausal female here for pelvic examination. She has no acute issues in this area, but it has been many years since her last Pap. She does have several acute concerns today. 1 of these concerns her large ventral hernia which she has had for some time. She has a nonhealing area on this that she has to constantly keep dressed because of drainage. It is slightly uncomfortable although not particularly painful. She does not notice any fever or significant erythema surrounding this. She previously saw wound care for this many years ago. Surgery has evaluated her and decided that she is not a good surgical candidate for repair. Her second problem concerns intermittent rectal bleeding. She does complain of intermittent diarrhea and constipation, but this seems to be unrelated to the times when she will have some bleeding when she wipes. She does note the hemorrhoids come out that she has to put back in manually. She has been using Anusol HC and did stop the Eliquis. Since stopping the Eliquis she has had less bleeding. No abdominal or pelvic pain. No dyspareunia. No abnormal vaginal  discharge. Previous Pap smears normal. Last Pap smear years. No urinary or GI symptoms. Medical/ surgical history and medications reviewed. Examination :  Blood pressure (!) 94/58, pulse 73, temperature 97.8 °F (36.6 °C), temperature source Temporal, resp. rate 20, SpO2 98 %, not currently breastfeeding. GA : Healthy. Neck : Supple. Thyroid : normal, no goiter. Breasts : no masses. No skin changes or lymphadenopathy. No nipple discharge. V&V : Normal female external genitalia. Cervix : No lesions, pap smear obtained. Uterus : Normal size and shape. Adnexa : Free, no masses. Rectal: Few small external hemorrhoids associated with skin tags. She has lots of Anusol cream in the area.   No obvious bleeding or any other lesions. Abd: Large ventral hernia that protrudes 4 to 5 cm outside of her abdominal wall. The central portion is ventral hernia is open and draining a mucopurulent discharge. Once the discharge is cleansed the wound itself looks clean the wound measures 4 to 5 cm in greatest diameter. Assessment :  Well-woman    Hemorrhoids    Chronic wound over large ventral hernia    Plan:   Pap smear : Obtained along with HPV. Mammogram : Ordered. Lab tests were obtained as well today. I am making a referral to wound care. I also wonder if she would benefit from placing an abdominal binder or Ace wrap over the ventral hernia once it is bandaged. This might take some pressure off of the wound itself. I will wait to hear what wound care has did recommend. I will see her back in approximately 2 months. The patient was informed about the importance of regular gynecological  examination and pap smear. She was also  informed of the need for yearly mammogram after the age of 36. During the reproductive years, she should take folic acid daily in order to decrease the risk of neural tube defects such as spina bifida. Adequate calcium and vitamin D intake should be added to a healthy diet ,and weight bearing exercise continued daily for improved cardiovascular and bone health. The patient was reminded of the importance of safe sexual practices including a mutually monogamous relationship and the use of  barrier contraception. Health maintenance topics, including immunizations, colon cancer screening, and general healthy lifestyle habits were discussed as appropriate. All questions have been answered.

## 2020-12-29 LAB
Lab: NORMAL
REPORT: NORMAL
THIS TEST SENT TO: NORMAL

## 2020-12-30 ENCOUNTER — TELEPHONE (OUTPATIENT)
Dept: FAMILY MEDICINE CLINIC | Age: 65
End: 2020-12-30

## 2020-12-30 NOTE — TELEPHONE ENCOUNTER
Attempted to call mobile and home numbers without an answer. Her pap smear was abnormal (ASCUS) and HPV was positive. She will need to see a gynecologist for colposcopy. I have made a referral to the GYN clinic at [facility], but if she would prefer a different gynecologist, she needs to let me know ASAP. This is not showing cancer, but it could be a sign of cancer or precancerous lesion. It is very important to get further testing.

## 2020-12-31 DIAGNOSIS — J44.9 COPD, MODERATE (HCC): ICD-10-CM

## 2020-12-31 RX ORDER — PROMETHAZINE HYDROCHLORIDE AND CODEINE PHOSPHATE 6.25; 1 MG/5ML; MG/5ML
7.5 SYRUP ORAL 3 TIMES DAILY PRN
Qty: 473 ML | Refills: 1 | OUTPATIENT
Start: 2020-12-31 | End: 2021-03-31

## 2020-12-31 RX ORDER — ESCITALOPRAM OXALATE 10 MG/1
20 TABLET ORAL DAILY
Qty: 60 TABLET | Refills: 0 | Status: SHIPPED
Start: 2020-12-31 | End: 2021-01-25 | Stop reason: SDUPTHER

## 2020-12-31 RX ORDER — METOPROLOL SUCCINATE 50 MG/1
TABLET, EXTENDED RELEASE ORAL
Qty: 90 TABLET | Refills: 3 | Status: SHIPPED
Start: 2020-12-31 | End: 2021-06-21 | Stop reason: SDUPTHER

## 2020-12-31 NOTE — TELEPHONE ENCOUNTER
Cough medicine requested too soon. Each bottle should last twenty days. I will refill this next week. She will need to call and request it next Wednesday.

## 2021-01-05 DIAGNOSIS — N64.4 BREAST PAIN, LEFT: Primary | ICD-10-CM

## 2021-01-06 DIAGNOSIS — J44.9 COPD, MODERATE (HCC): ICD-10-CM

## 2021-01-06 RX ORDER — PROMETHAZINE HYDROCHLORIDE AND CODEINE PHOSPHATE 6.25; 1 MG/5ML; MG/5ML
7.5 SYRUP ORAL 3 TIMES DAILY PRN
Qty: 473 ML | Refills: 1 | Status: SHIPPED
Start: 2021-01-06 | End: 2021-02-12 | Stop reason: SDUPTHER

## 2021-01-06 NOTE — TELEPHONE ENCOUNTER
Last Appointment:  12/21/2020  Future Appointments   Date Time Provider Mauri Sanford   1/14/2021  3:00 PM [facility] RM 2 SEY[facility]   1/14/2021  3:30 PM [facility] RM 1 SEY[facility]   2/22/2021  2:40 PM MD Isabel Sheikh [facility]

## 2021-01-08 ENCOUNTER — TELEPHONE (OUTPATIENT)
Dept: FAMILY MEDICINE CLINIC | Age: 66
End: 2021-01-08

## 2021-01-08 DIAGNOSIS — F11.90 CHRONIC, CONTINUOUS USE OF OPIOIDS: ICD-10-CM

## 2021-01-08 DIAGNOSIS — G89.29 CHRONIC BILATERAL LOW BACK PAIN WITHOUT SCIATICA: ICD-10-CM

## 2021-01-08 DIAGNOSIS — M54.50 CHRONIC BILATERAL LOW BACK PAIN WITHOUT SCIATICA: ICD-10-CM

## 2021-01-08 DIAGNOSIS — M17.0 PRIMARY OSTEOARTHRITIS OF BOTH KNEES: ICD-10-CM

## 2021-01-08 RX ORDER — OXYCODONE AND ACETAMINOPHEN 7.5; 325 MG/1; MG/1
1 TABLET ORAL EVERY 6 HOURS PRN
Qty: 120 TABLET | Refills: 0 | Status: SHIPPED
Start: 2021-01-08 | End: 2021-02-05 | Stop reason: SDUPTHER

## 2021-01-08 NOTE — TELEPHONE ENCOUNTER
Last Appointment:  12/21/2020  Future Appointments   Date Time Provider Mauri Nicolei   1/14/2021  3:00 PM [facility] RM 2 SEYZ [facility]   [facility]   2/4/2021  3:00 PM Jonathon Green  N [facility]   2/22/2021  2:40 PM Moiz Leblanc MD [facility]    \

## 2021-01-20 ENCOUNTER — HOSPITAL ENCOUNTER (OUTPATIENT)
Dept: WOUND CARE | Age: 66
Discharge: HOME | End: 2021-01-20
Payer: COMMERCIAL

## 2021-01-25 RX ORDER — ESCITALOPRAM OXALATE 10 MG/1
20 TABLET ORAL DAILY
Qty: 60 TABLET | Refills: 0 | Status: SHIPPED
Start: 2021-01-25 | End: 2021-03-03 | Stop reason: SDUPTHER

## 2021-01-25 NOTE — TELEPHONE ENCOUNTER
Last Appointment:  12/21/2020  Future Appointments   Date Time Provider Mauri Sanford   1/27/2021  3:00 PM [facility] [address] [facility] RM 2 SEYZ [facility]   1/27/2021  3:30 PM [facility] RM 1 SEYZ [facility] Rad[facility]   2/4/2021  3:00 PM Amada Mir MD [facility]   2/16/2021  3:00 PM Lindsay Moffett [facility] [address] [facility]   2/22/2021  2:40 [facility]

## 2021-01-27 ENCOUNTER — HOSPITAL ENCOUNTER (OUTPATIENT)
Dept: GENERAL RADIOLOGY | Age: 66
Discharge: HOME | End: 2021-01-29
Payer: COMMERCIAL

## 2021-01-27 DIAGNOSIS — N64.4 BREAST PAIN, LEFT: ICD-10-CM

## 2021-01-27 DIAGNOSIS — Z12.31 ENCOUNTER FOR SCREENING MAMMOGRAM FOR MALIGNANT NEOPLASM OF BREAST: ICD-10-CM

## 2021-01-27 PROCEDURE — G0279 TOMOSYNTHESIS, MAMMO: HCPCS

## 2021-02-01 ENCOUNTER — TELEPHONE (OUTPATIENT)
Dept: FAMILY MEDICINE CLINIC | Age: 66
End: 2021-02-01

## 2021-02-01 DIAGNOSIS — K64.8 PROLAPSED HEMORRHOIDS: Primary | ICD-10-CM

## 2021-02-01 NOTE — TELEPHONE ENCOUNTER
Called and spoke to patient. She describes prolapse with bleeding and diarrhea. She has been able to reduce it manually, but there is a lot of bleeding. It is painful and her bowels are loose. It bothers her even to sit to urinate. I advised increasing fiber using an OTC fiber supplement (gave some recommendations), sitting in warm water for 15 minutes after BM, and using moistened wipes instead of toilet paper. I am also going to refer her to surgery for possible surgical treatment.

## 2021-02-01 NOTE — TELEPHONE ENCOUNTER
Pt called in complaining of rectal bleeding and problem controlling bowels, pt states she is very sore and worried. She would like to speak to her pcp. Pt scheduled to come in on 2-8-21 for further evaluation.

## 2021-02-04 ENCOUNTER — OFFICE VISIT (OUTPATIENT)
Dept: OBGYN | Age: 66
End: 2021-02-04
Payer: COMMERCIAL

## 2021-02-04 VITALS — TEMPERATURE: 98 F

## 2021-02-04 DIAGNOSIS — R87.610 ASCUS WITH POSITIVE HIGH RISK HPV CERVICAL: Primary | ICD-10-CM

## 2021-02-04 DIAGNOSIS — R87.810 ASCUS WITH POSITIVE HIGH RISK HPV CERVICAL: Primary | ICD-10-CM

## 2021-02-04 PROCEDURE — 99202 OFFICE O/P NEW SF 15 MIN: CPT | Performed by: OBSTETRICS & GYNECOLOGY

## 2021-02-04 PROCEDURE — G8484 FLU IMMUNIZE NO ADMIN: HCPCS | Performed by: OBSTETRICS & GYNECOLOGY

## 2021-02-04 PROCEDURE — 1036F TOBACCO NON-USER: CPT | Performed by: OBSTETRICS & GYNECOLOGY

## 2021-02-04 PROCEDURE — G8427 DOCREV CUR MEDS BY ELIG CLIN: HCPCS | Performed by: OBSTETRICS & GYNECOLOGY

## 2021-02-04 PROCEDURE — G8417 CALC BMI ABV UP PARAM F/U: HCPCS | Performed by: OBSTETRICS & GYNECOLOGY

## 2021-02-04 PROCEDURE — 99203 OFFICE O/P NEW LOW 30 MIN: CPT | Performed by: OBSTETRICS & GYNECOLOGY

## 2021-02-04 PROCEDURE — 3017F COLORECTAL CA SCREEN DOC REV: CPT | Performed by: OBSTETRICS & GYNECOLOGY

## 2021-02-04 NOTE — PROGRESS NOTES
Patient alert and pleasant with concerns about abnormal PAP  Here today with gentleman who pushed patient in wheelchair and assisted patient with needs  Patient just a talk in regards to abnormal PAP   Will schedule colpo with patient Discharge instructions have been discussed with the patient. Patient advised to call our office with any questions or concerns. Voiced understanding.

## 2021-02-04 NOTE — PROGRESS NOTES
Irena Mirza     Patient presents for discussion regarding ASCUS, positive high risk HPV (type 18). Cervical dysplasia and HPV reviewed in detail. Discussed need for colposcopy.      Past Medical History:   Diagnosis Date    A-fib [facility])     follows with Dr Lindsay Blair Abnormal mammogram 2010    Acute on chronic respiratory failure ([address] 5/5/2017    Anemia due to chronic illness     Ankle fracture, left 2008    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[address] 3/26/2007    Atrial fibrillation (HCC)     Backache     Benign hypertension     Chronic back pain     Chronic kidney disease     stage 2    Chronic pain disorder     Compression fracture of thoracic vertebra (HCC)     T10    Deformity of ankle and foot, acquired 1/31/2011    Depression     Diabetes insipidus ([address]     Diverticulitis     Dry eyes     Encephalopathy acute 5/5/2017    GERD (gastroesophageal reflux disease)     Hemorrhoids 1/13/2012    Hernia     History of blood transfusion approx 05/2017    History of 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    Osteoarthritis     severe    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HCC)     PCWP mean:26.0 PA mean: 24.00; denies any recent issues    Pulmonary hypertension (HCC)     ventricular diastolic function consistent with abnormal relaxation (stage I)    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, f/u w/ PCP    Rhabdomyolysis 5/9/2011    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 fx T10    LUMBAR DISC SURGERY      OTHER SURGICAL HISTORY  11/1/11    removal r leg external fixator    TIBIA / Gillian Renny LENGTHENING      application TSF  9/7/42    UPPER GASTROINTESTINAL ENDOSCOPY  1/4/2016    UPPER GASTROINTESTINAL ENDOSCOPY  07/26/2017        Family History   Problem Relation Age of Onset    Diabetes Mother     Arthritis Mother     High Blood Pressure Mother     Arthritis Father     High Blood Pressure Father     Diabetes Father     High Blood Pressure Sister     Alcohol Abuse Sister     Substance Abuse Brother     Substance Abuse Sister     Coronary Art Dis Neg Hx         Social History     Tobacco History     Smoking Status  Former Smoker Smoking Start Date  1/1/1971 Quit date  9/10/1996 Smoking Frequency  0.75 packs/day for 25 years (18.75 pk yrs)    Smoking Tobacco Type  Cigarettes    Smokeless Tobacco Use  Never Used          Alcohol History     Alcohol Use Status  No Comment  drinks mountain dew and clear pops. \"i drink alot of pop\" maybe 2 liters in a day. Drug Use     Drug Use Status  No Comment  1996 coccaine          Sexual Activity     Sexually Active  Not Currently                  Current Outpatient Medications:     escitalopram (LEXAPRO) 10 MG tablet, Take 2 tablets by mouth daily, Disp: 60 tablet, Rfl: 0    oxyCODONE-acetaminophen (PERCOCET) 7.5-325 MG per tablet, Take 1 tablet by mouth every 6 hours as needed for Pain for up to 30 days. Intended supply: 30 days, Disp: 120 tablet, Rfl: 0    promethazine-codeine (PHENERGAN WITH CODEINE) 6.25-10 MG/5ML syrup, Take 7.5 mLs by mouth 3 times daily as needed for Cough for up to 90 days. , Disp: 473 mL, Rfl: 1    metoprolol succinate (TOPROL XL) 50 MG extended release tablet, TAKE ONE TABLET EVERY DAY WITH THE 100MG, Disp: 90 tablet, Rfl: 3    omega-3 acid ethyl esters (LOVAZA) 1 g capsule, , Disp: , Rfl:     ferrous sulfate (IRON 325) 325 (65 Fe) MG tablet, Take 1 tablet by mouth 2 times daily, Disp: 60 tablet, Rfl: 5    docusate sodium (COLACE) 100 MG capsule, Take 1 capsule by mouth 2 times daily, Disp: 30 capsule, Rfl: 2    cycloSPORINE (RESTASIS) 0.05 % ophthalmic emulsion, Place 1 drop into both eyes every 12 hours, Disp: 1 vial, Rfl: 2    levothyroxine (SYNTHROID) 75 MCG tablet, Take 1 tablet by mouth daily, Disp: 30 tablet, Rfl: 5    BREO ELLIPTA 100-25 MCG/INH AEPB inhaler, Inhale 1 puff into the lungs daily, Disp: 28 each, Rfl: 5    desmopressin (DDAVP) 0.1 MG tablet, Take 2 tablets by mouth 2 times daily, Disp: 30 tablet, Rfl: 3    predniSONE (DELTASONE) 5 MG tablet, Take 1 tablet by mouth daily, Disp: 30 tablet, Rfl: 5    apixaban (ELIQUIS) 5 MG TABS tablet, Take 1 tablet by mouth 2 times daily, Disp: 60 tablet, Rfl: 5    amLODIPine (NORVASC) 5 MG tablet, Take 1 tablet by mouth daily, Disp: 30 tablet, Rfl: 5    sodium chloride (ALTAMIST SPRAY) 0.65 % nasal spray, 1 spray by Nasal route as needed for Congestion, Disp: 1 Bottle, Rfl: 3    metoprolol succinate (TOPROL XL) 100 MG extended release tablet, Take 1 tablet by mouth daily, Disp: 90 tablet, Rfl: 3    albuterol sulfate HFA (PROVENTIL HFA) 108 (90 Base) MCG/ACT inhaler, Inhale 2 puffs into the lungs every 6 hours as needed for Wheezing or Shortness of Breath, Disp: 1 Inhaler, Rfl: 3    OXYGEN, 4 L/min by Nasal route as needed. BMS, Disp: , Rfl:     mupirocin (BACTROBAN) 2 % ointment, Apply topically daily, Disp: 30 g, Rfl: 2    Omega 3 1000 MG CAPS, Take 1 each by mouth daily, Disp: 90 capsule, Rfl: 1    gabapentin (NEURONTIN) 100 MG capsule, Take 100 mg by mouth nightly as needed.  , Disp: , Rfl:     hydrocortisone (ANUSOL-HC) 2.5 % CREA rectal cream, Apply to affected area BID, Disp: 1 Tube, Rfl: 5    cimetidine (TAGAMET) 400 MG tablet, Take 1 tablet by mouth 2 times daily, Disp: 180 tablet, Rfl: 3    Menthol, Topical Analgesic, (BIOFREEZE) 4 % GEL, Apply BID to affected areas, Disp: 150 mL, Rfl: 5     No Known Allergies     Vitals:    02/04/21 1449   Temp: 98 °F (36.7 °C)        Physical Exam:  General: pleasant     Breasts: deferred     Pelvic exam: deferred     Boaz Gallegos was seen today for gynecologic exam.    Diagnoses and all orders for this visit:    ASCUS with positive high risk HPV cervical    All questions were answered. Return for colposcopy.      Shari Dela Cruz MD

## 2021-02-05 DIAGNOSIS — M54.50 CHRONIC BILATERAL LOW BACK PAIN WITHOUT SCIATICA: ICD-10-CM

## 2021-02-05 DIAGNOSIS — F11.90 CHRONIC, CONTINUOUS USE OF OPIOIDS: ICD-10-CM

## 2021-02-05 DIAGNOSIS — M17.0 PRIMARY OSTEOARTHRITIS OF BOTH KNEES: ICD-10-CM

## 2021-02-05 DIAGNOSIS — G89.29 CHRONIC BILATERAL LOW BACK PAIN WITHOUT SCIATICA: ICD-10-CM

## 2021-02-05 RX ORDER — OXYCODONE AND ACETAMINOPHEN 7.5; 325 MG/1; MG/1
1 TABLET ORAL EVERY 6 HOURS PRN
Qty: 120 TABLET | Refills: 0 | Status: SHIPPED
Start: 2021-02-05 | End: 2021-03-04 | Stop reason: SDUPTHER

## 2021-02-08 ENCOUNTER — OFFICE VISIT (OUTPATIENT)
Dept: SURGERY | Age: 66
End: 2021-02-08
Payer: COMMERCIAL

## 2021-02-08 VITALS
WEIGHT: 183 LBS | HEIGHT: 65 IN | OXYGEN SATURATION: 96 % | BODY MASS INDEX: 30.49 KG/M2 | SYSTOLIC BLOOD PRESSURE: 100 MMHG | TEMPERATURE: 98 F | DIASTOLIC BLOOD PRESSURE: 70 MMHG | HEART RATE: 54 BPM | RESPIRATION RATE: 18 BRPM

## 2021-02-08 DIAGNOSIS — J44.9 CHRONIC OBSTRUCTIVE PULMONARY DISEASE, UNSPECIFIED COPD TYPE (HCC): ICD-10-CM

## 2021-02-08 DIAGNOSIS — K64.2 PROLAPSED INTERNAL HEMORRHOIDS, GRADE 3: Primary | ICD-10-CM

## 2021-02-08 DIAGNOSIS — I10 ESSENTIAL HYPERTENSION: ICD-10-CM

## 2021-02-08 DIAGNOSIS — Z79.01 ANTICOAGULATED: ICD-10-CM

## 2021-02-08 PROCEDURE — 99204 OFFICE O/P NEW MOD 45 MIN: CPT | Performed by: SURGERY

## 2021-02-08 PROCEDURE — 3023F SPIROM DOC REV: CPT | Performed by: SURGERY

## 2021-02-08 PROCEDURE — G8427 DOCREV CUR MEDS BY ELIG CLIN: HCPCS | Performed by: SURGERY

## 2021-02-08 PROCEDURE — G8417 CALC BMI ABV UP PARAM F/U: HCPCS | Performed by: SURGERY

## 2021-02-08 PROCEDURE — G8926 SPIRO NO PERF OR DOC: HCPCS | Performed by: SURGERY

## 2021-02-08 PROCEDURE — G8484 FLU IMMUNIZE NO ADMIN: HCPCS | Performed by: SURGERY

## 2021-02-08 RX ORDER — AMLODIPINE BESYLATE 5 MG/1
5 TABLET ORAL DAILY
Qty: 30 TABLET | Refills: 5 | Status: SHIPPED
Start: 2021-02-08 | End: 2021-08-18 | Stop reason: SDUPTHER

## 2021-02-08 RX ORDER — DESMOPRESSIN ACETATE 0.1 MG/1
0.2 TABLET ORAL 2 TIMES DAILY
Qty: 30 TABLET | Refills: 3 | Status: SHIPPED
Start: 2021-02-08 | End: 2021-03-19 | Stop reason: SDUPTHER

## 2021-02-08 NOTE — PATIENT INSTRUCTIONS
Add fiber supplement - metamucil 3-4 times a day as tolerated  Water 8 cups a day        Patient Education        Hemorrhoids: Care Instructions  Your Care Instructions     Hemorrhoids are enlarged veins that develop in the anal canal. Bleeding during bowel movements, itching, swelling, and rectal pain are the most common symptoms. They can be uncomfortable at times, but hemorrhoids rarely are a serious problem. You can treat most hemorrhoids with simple changes to your diet and bowel habits. These changes include eating more fiber and not straining to pass stools. Most hemorrhoids do not need surgery or other treatment unless they are very large and painful or bleed a lot. Follow-up care is a key part of your treatment and safety. Be sure to make and go to all appointments, and call your doctor if you are having problems. It's also a good idea to know your test results and keep a list of the medicines you take. How can you care for yourself at home? · Sit in a few inches of warm water (sitz bath) 3 times a day and after bowel movements. The warm water helps with pain and itching. · Put ice on your anal area several times a day for 10 minutes at a time. Put a thin cloth between the ice and your skin. Follow this by placing a warm, wet towel on the area for another 10 to 20 minutes. · Take pain medicines exactly as directed. ? If the doctor gave you a prescription medicine for pain, take it as prescribed. ? If you are not taking a prescription pain medicine, ask your doctor if you can take an over-the-counter medicine. · Keep the anal area clean, but be gentle. Use water and a fragrance-free soap, such as [place], or use baby wipes or medicated pads, such as Tucks. · Wear cotton underwear and loose clothing to decrease moisture in the anal area. · Eat more fiber. Include foods such as whole-grain breads and cereals, raw vegetables, raw and dried fruits, and beans.   · Drink plenty of fluids, enough so that your urine is light yellow or clear like water. If you have kidney, heart, or liver disease and have to limit fluids, talk with your doctor before you increase the amount of fluids you drink. · Use a stool softener that contains bran or psyllium. You can save money by buying bran or psyllium (available in bulk at most health food stores) and sprinkling it on foods or stirring it into fruit juice. Or you can use a product such as Metamucil or Hydrocil. · Practice healthy bowel habits. ? Go to the bathroom as soon as you have the urge. ? Avoid straining to pass stools. Relax and give yourself time to let things happen naturally. ? Do not hold your breath while passing stools. ? Do not read while sitting on the toilet. Get off the toilet as soon as you have finished. · Take your medicines exactly as prescribed. Call your doctor if you think you are having a problem with your medicine. When should you call for help? Call 911 anytime you think you may need emergency care. For example, call if:    · You pass maroon or very bloody stools. Call your doctor now or seek immediate medical care if:    · You have increased pain.     · You have increased bleeding. Watch closely for changes in your health, and be sure to contact your doctor if:    · Your symptoms have not improved after 3 or 4 days. Where can you learn more? Go to https://[email or web address]. org and sign in to your [employer] account. Enter A182 in the [facility] box to learn more about \"Hemorrhoids: Care Instructions. \"     If you do not have an account, please click on the \"Sign Up Now\" link. Current as of: April 15, 2020               Content Version: 12.6  © 2013-0574 [employer]. Care instructions adapted under license by [facility] ([facility]).  If you have questions about a medical condition or this instruction, always ask your healthcare professional. [employer]do [employer] any warranty or liability for your use of this information.

## 2021-02-08 NOTE — PROGRESS NOTES
History and Physical - General Surgery    Patient's Name/Date of Birth: Naheed Carter / 1955    Date: 2/8/2021    PCP: Adam Chacon MD    Referring Physician:   Tyra Galloway MD  [phone]      CHIEF COMPLAINT:    Chief Complaint   Patient presents with    Hemorrhoids         HISTORY OF PRESENT ILLNESS:    Naheed Carter is an 60 y.o. female who presents with hemorrhoid issues again. She said she was ok for the past three years until the past month. She is now feeling her hemorrhoids protrude again. She said she has to push them back in. She denies diarrhea or constipation, but her son said she spends a lot of time sitting on the toilet. She has also gained weight. She has some bleeding but it is hard to assess how severe this is. Her son said she is on Eliquis now which he has stopped on occasion (against advice from her physicians) because she is bleeding. He said she bleeds from her nose and mouth and sometimes her anus. He said the bleeding stops when he stops her blood thinner. She still has the open wound on her abdomen. She said she has been treating it at home. She has not noticed much improvement of this.       Past Medical History:   Past Medical History:   Diagnosis Date    A-fib [facility])     follows with Dr Cornelio Khan Abnormal mammogram 2010    Acute on chronic respiratory failure ([address] 5/5/2017    Anemia due to chronic illness     Ankle fracture, left 2008    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[address] 3/26/2007    Atrial fibrillation (HCC)     Backache     Benign hypertension     Chronic back pain     Chronic kidney disease     stage 2    Chronic pain disorder     Compression fracture of thoracic vertebra (HCC)     T10    Deformity of ankle and foot, acquired 1/31/2011    Depression     Diabetes insipidus ([address]     Diverticulitis     Dry eyes     Encephalopathy acute 5/5/2017    GERD (gastroesophageal reflux disease)     Hemorrhoids 1/13/2012    Hernia     promethazine-codeine (PHENERGAN WITH CODEINE) 6.25-10 MG/5ML syrup Take 7.5 mLs by mouth 3 times daily as needed for Cough for up to 90 days. 473 mL 1    metoprolol succinate (TOPROL XL) 50 MG extended release tablet TAKE ONE TABLET EVERY DAY WITH THE 100MG 90 tablet 3    omega-3 acid ethyl esters (LOVAZA) 1 g capsule       Omega 3 1000 MG CAPS Take 1 each by mouth daily 90 capsule 1    ferrous sulfate (IRON 325) 325 (65 Fe) MG tablet Take 1 tablet by mouth 2 times daily 60 tablet 5    docusate sodium (COLACE) 100 MG capsule Take 1 capsule by mouth 2 times daily 30 capsule 2    cycloSPORINE (RESTASIS) 0.05 % ophthalmic emulsion Place 1 drop into both eyes every 12 hours 1 vial 2    levothyroxine (SYNTHROID) 75 MCG tablet Take 1 tablet by mouth daily 30 tablet 5    BREO ELLIPTA 100-25 MCG/INH AEPB inhaler Inhale 1 puff into the lungs daily 28 each 5    desmopressin (DDAVP) 0.1 MG tablet Take 2 tablets by mouth 2 times daily 30 tablet 3    hydrocortisone (ANUSOL-HC) 2.5 % CREA rectal cream Apply to affected area BID 1 Tube 5    predniSONE (DELTASONE) 5 MG tablet Take 1 tablet by mouth daily 30 tablet 5    apixaban (ELIQUIS) 5 MG TABS tablet Take 1 tablet by mouth 2 times daily 60 tablet 5    amLODIPine (NORVASC) 5 MG tablet Take 1 tablet by mouth daily 30 tablet 5    sodium chloride (ALTAMIST SPRAY) 0.65 % nasal spray 1 spray by Nasal route as needed for Congestion 1 Bottle 3    metoprolol succinate (TOPROL XL) 100 MG extended release tablet Take 1 tablet by mouth daily 90 tablet 3    cimetidine (TAGAMET) 400 MG tablet Take 1 tablet by mouth 2 times daily 180 tablet 3    albuterol sulfate HFA (PROVENTIL HFA) 108 (90 Base) MCG/ACT inhaler Inhale 2 puffs into the lungs every 6 hours as needed for Wheezing or Shortness of Breath 1 Inhaler 3    Menthol, Topical Analgesic, (BIOFREEZE) 4 % GEL Apply BID to affected areas 150 mL 5    OXYGEN 4 L/min by Nasal route as needed.  BMS      mupirocin (BACTROBAN) 2 % ointment Apply topically daily (Patient not taking: Reported on 2021) 30 g 2     No current facility-administered medications for this visit. Social History:   Social History     Tobacco Use    Smoking status: Former Smoker     Packs/day: 0.75     Years: 25.00     Pack years: 18.75     Types: Cigarettes     Start date:      Quit date: 9/10/1996     Years since quittin.4    Smokeless tobacco: Never Used   Substance Use Topics    Alcohol use: No     Alcohol/week: 0.0 standard drinks     Comment: drinks mountain dew and clear pops. \"i drink alot of pop\" maybe 2 liters in a day. Family History:   Family History   Problem Relation Age of Onset    Diabetes Mother     Arthritis Mother     High Blood Pressure Mother     Arthritis Father     High Blood Pressure Father     Diabetes Father     High Blood Pressure Sister     Alcohol Abuse Sister     Substance Abuse Brother     Substance Abuse Sister     Coronary Art Dis Neg Hx        REVIEW OF SYSTEMS:    Constitutional: negative  Eyes: negative  Ears, nose, mouth, throat, and face: negative  Respiratory: negative  Cardiovascular: negative  Gastrointestinal: as in HPI   Genitourinary:negative  Integument/breast: as in hpi  Hematologic/lymphatic: negative  Musculoskeletal:negative  Neurological: negative  Allergic/Immunologic: negative    PHYSICAL EXAM   /70   Pulse 54   Temp 98 °F (36.7 °C) (Temporal)   Resp 18   Ht 5' 5\" (1.651 m)   Wt 183 lb (83 kg)   SpO2 96%   BMI 30.45 kg/m²     General appearance: alert, cooperative and in no acute distress. Eyes: Grossly normal   Lungs: Normal work of breathing  Heart: regular rate  Abdomen: open wound along previous midline incision without any sign of infection  Skin: No skin abnormalities  Neurologic: Alert and oriented x 3. Grossly normal  Musculoskeletal: No edema.   Rectal: small external hemorrhoids x 2, no enlarged internal hemorrhoids on internal rectal exam      ASSESSMENT AND PLAN:       Assessment: Regis Alexander is an 60 y.o. female who presents with possible recurrence of her internal hemorrhoids s/p hemorrhoidectomy several years ago, anticoagulated on Eliquis, COPD on 4 L oxygen    Plan:   Conservative management of hemorrhoids for now  Continue preparation H as she says this is helping  Recommend not spending long amounts of time on the toilet  Continue Eliquis   Follow up in 1 month  Can try banding if no improvement    Physician Signature: Electronically signed by Jb Barahona MD, General Surgery    Send copy of H&P to PCP, Elder Martel MD and referring physician, Ty Li MD

## 2021-02-12 DIAGNOSIS — J44.9 COPD, MODERATE (HCC): ICD-10-CM

## 2021-02-12 RX ORDER — FERROUS SULFATE 325(65) MG
325 TABLET ORAL 2 TIMES DAILY
Qty: 60 TABLET | Refills: 5 | Status: SHIPPED
Start: 2021-02-12 | End: 2021-08-16 | Stop reason: SDUPTHER

## 2021-02-12 RX ORDER — PROMETHAZINE HYDROCHLORIDE AND CODEINE PHOSPHATE 6.25; 1 MG/5ML; MG/5ML
7.5 SYRUP ORAL 3 TIMES DAILY PRN
Qty: 473 ML | Refills: 1 | Status: SHIPPED
Start: 2021-02-12 | End: 2021-04-02 | Stop reason: SDUPTHER

## 2021-02-16 ENCOUNTER — HOSPITAL ENCOUNTER (OUTPATIENT)
Dept: WOUND CARE | Age: 66
Discharge: HOME | End: 2021-02-16
Payer: COMMERCIAL

## 2021-03-01 ENCOUNTER — OFFICE VISIT (OUTPATIENT)
Dept: FAMILY MEDICINE CLINIC | Age: 66
End: 2021-03-01
Payer: COMMERCIAL

## 2021-03-01 VITALS
RESPIRATION RATE: 20 BRPM | BODY MASS INDEX: 37.89 KG/M2 | SYSTOLIC BLOOD PRESSURE: 106 MMHG | WEIGHT: 193 LBS | TEMPERATURE: 98.4 F | HEIGHT: 60 IN | OXYGEN SATURATION: 91 % | HEART RATE: 63 BPM | DIASTOLIC BLOOD PRESSURE: 74 MMHG

## 2021-03-01 DIAGNOSIS — I10 BENIGN ESSENTIAL HYPERTENSION: ICD-10-CM

## 2021-03-01 DIAGNOSIS — I48.0 PAF (PAROXYSMAL ATRIAL FIBRILLATION) (HCC): ICD-10-CM

## 2021-03-01 DIAGNOSIS — E03.9 PRIMARY HYPOTHYROIDISM: ICD-10-CM

## 2021-03-01 DIAGNOSIS — J41.8 MIXED SIMPLE AND MUCOPURULENT CHRONIC BRONCHITIS (HCC): ICD-10-CM

## 2021-03-01 DIAGNOSIS — J96.11 CHRONIC RESPIRATORY FAILURE WITH HYPOXIA (HCC): Primary | Chronic | ICD-10-CM

## 2021-03-01 DIAGNOSIS — N18.30 STAGE 3 CHRONIC KIDNEY DISEASE, UNSPECIFIED WHETHER STAGE 3A OR 3B CKD (HCC): ICD-10-CM

## 2021-03-01 DIAGNOSIS — K62.5 BRIGHT RED RECTAL BLEEDING: ICD-10-CM

## 2021-03-01 DIAGNOSIS — F11.90 CHRONIC, CONTINUOUS USE OF OPIOIDS: Chronic | ICD-10-CM

## 2021-03-01 DIAGNOSIS — E23.2 DIABETES INSIPIDUS, NEUROHYPOPHYSEAL (HCC): ICD-10-CM

## 2021-03-01 DIAGNOSIS — I50.42 CHRONIC COMBINED SYSTOLIC AND DIASTOLIC HEART FAILURE (HCC): ICD-10-CM

## 2021-03-01 PROCEDURE — G8417 CALC BMI ABV UP PARAM F/U: HCPCS | Performed by: FAMILY MEDICINE

## 2021-03-01 PROCEDURE — 99212 OFFICE O/P EST SF 10 MIN: CPT | Performed by: FAMILY MEDICINE

## 2021-03-01 PROCEDURE — 3023F SPIROM DOC REV: CPT | Performed by: FAMILY MEDICINE

## 2021-03-01 PROCEDURE — 99214 OFFICE O/P EST MOD 30 MIN: CPT | Performed by: FAMILY MEDICINE

## 2021-03-01 PROCEDURE — 3017F COLORECTAL CA SCREEN DOC REV: CPT | Performed by: FAMILY MEDICINE

## 2021-03-01 PROCEDURE — 1036F TOBACCO NON-USER: CPT | Performed by: FAMILY MEDICINE

## 2021-03-01 PROCEDURE — G8428 CUR MEDS NOT DOCUMENT: HCPCS | Performed by: FAMILY MEDICINE

## 2021-03-01 PROCEDURE — G8484 FLU IMMUNIZE NO ADMIN: HCPCS | Performed by: FAMILY MEDICINE

## 2021-03-01 PROCEDURE — G8926 SPIRO NO PERF OR DOC: HCPCS | Performed by: FAMILY MEDICINE

## 2021-03-01 RX ORDER — POTASSIUM CHLORIDE 750 MG/1
1 TABLET, FILM COATED, EXTENDED RELEASE ORAL DAILY
Status: ON HOLD | COMMUNITY
End: 2021-12-14 | Stop reason: HOSPADM

## 2021-03-01 RX ORDER — DOCUSATE SODIUM 100 MG/1
100 CAPSULE, LIQUID FILLED ORAL 2 TIMES DAILY
Qty: 60 CAPSULE | Refills: 3 | Status: SHIPPED
Start: 2021-03-01 | End: 2021-08-03 | Stop reason: SDUPTHER

## 2021-03-01 NOTE — PROGRESS NOTES
[facility]  FAMILY MEDICINE RESIDENCY PROGRAM   OFFICE PROGRESS NOTE  DATE OF VISIT : 3/1/2021         Chief Complaint :   Chief Complaint   Patient presents with    Rectal Bleeding    Dysuria       HPI:   Angeles Lagos comes to clinic today for     F/U of chronic problem(s) and new or recent complaint of Rectal bleeding     Chronic problems reviewed today include:     COPD, Congestive Heart Failure, Chronic kidney disease, Chronic pain, Osteoarthritis, Hypothyroidism and Diabetes insipidus, paroxysmal atrial fibrillation, sarcoidosis, chronic narcotic use    Current status of this/these condition(s):  stable    Tolerating meds: Yes    Additional history: Cayla Harris came in today accompanied by her . She is in a wheelchair and on chronic oxygen therapy. She was over an hour late for her appointment. Her chronic problems are reasonably stable, however she feels that she needs more narcotics. She tells me that she will run out of her narcotic soon, although it should last her until the seventh. She states that she is having more pain and does not feel as though she is on enough narcotics. She has a nonhealing area of the abdominal hernia overlying a previous scar. She has been unable to make it to wound care appointments because of an inability to obtain transportation. She has some chronic drainage from this, although it is not purulent and there is minimal bleeding. The  states that he feels there is been some improvement in this area. She did follow-up with surgery, who recommended Metamucil. It was stated that should her bleeding persist, she could undergo hemorrhoid banding. She tells me that her bleeding continues but is lessened. She was restarted on her Eliquis. She is supposed to take no more than 4 tablets of Percocet daily. I have asked her to decrease this, but apparently she is using more than 4 at this time.   She complains of persistent pain in multiple areas but in particular her left shoulder. She uses chronic oxygen, and is stable from that standpoint. She denies any new or increased shortness of breath or cough. She does follow with nephrology, and is asking to have laboratory testing done today. She had an abnormal Pap with high risk HPV, and was seen by GYN. She is scheduled to have a colposcopy done next month. I did stress the importance of keeping that appointment. Objective:    VS:  Blood pressure 106/74, pulse 63, temperature 98.4 °F (36.9 °C), temperature source Temporal, resp. rate 20, height 5' (1.524 m), weight 193 lb (87.5 kg), SpO2 91 %, not currently breastfeeding. General Appearance: Well developed, awake, alert, oriented, no acute distress  Chest wall/Lung: Decreased breath sounds bilaterally with occasional wheezes and rhonchi. Heart[de-identified]  Regular rate and rhythm, S1and S2 normal, no murmur, rub or gallop. Abdomen: Soft, nontender. Moderately large ventral hernia in the lower abdomen with a 4 to 5 cm area of denuded skin. There is a small amount of drainage on the bandage that was removed. There is no evidence of infection, and erythema is limited to within 1/2 cm around the wound  Neurologic:    Wheelchair-bound. She is alert and oriented, but does lower her eyelids as though she is drowsy or as a possible drug effect. Psychiatric: has a normal mood and affect. Behavior is normal.     I independently reviewed the following information:  referral letter/letters    1. Chronic respiratory failure with hypoxia (HCC)  2. PAF (paroxysmal atrial fibrillation) (HCC) currently in NSR  3. Chronic combined systolic and diastolic heart failure ([address]  4. Mixed simple and mucopurulent chronic bronchitis ([address]  5. Benign essential hypertension  -     Uric Acid; Future  -     Comprehensive Metabolic Panel; Future  -     TSH without Reflex; Future  6. Primary hypothyroidism  7. Diabetes insipidus, neurohypophyseal ([address]  8.  Stage 3 chronic kidney disease, unspecified whether stage 3a or 3b CKD  -     Phosphorus; Future  -     Magnesium; Future  -     Urinalysis; Future  9. Chronic, continuous use of opioids  10. Bright red rectal bleeding  -     CBC Auto Differential; Future      Additional Plan: I did obtain the laboratory testing as requested. She was reminded to keep all of her referral appointments, and be prompt. She should follow-up with surgery regarding her continued rectal bleeding. I reviewed OARRS, and this is consistent with her history. I will refill her narcotics on the fourth of this month. In the future, she was advised that I would refill this only after 30 days. I warned her that she would need to make her prescriptions last that long, or she would be left without any medication for the last few days. We talked about the possibility of obtaining transportation for her appointments. Her  is reluctant to use the [email or web address] because of his concerns regarding Covid. Marychuy Woodruff has already received her first vaccine dose, and is scheduled for the second. I suggested that the [employer] drivers were likely to have also received the vaccine, but that I could not promise that. Her  does believe that he can get her to her appointments, with the possible exception of wound care. On this date 3/1/2021 I have spent 37 minutes reviewing previous notes, test results and face to face with the patient discussing the diagnosis and importance of compliance with the treatment plan as well as documenting on the day of the visit. RTO in in 2 month(s) or sooner for any persistent, new, or worsening symptoms. Please see Patient Instructions for further counseling and information given. Advised to be adherent to the treatment plans discussed today, and please call with any questions or concerns, letting the office know of any reasons that the plans may not be followed.   The risks of untreated conditions include worsening illness, injury, disability, and possibly, death. Please call if symptoms change in any way, worsen, or fail to completely resolve, as this could necessitate a change to treatment plans. Patient and/or caregiver expressed understanding. Indications and proper use of medication(s) reviewed. Potential side-effects and risks of medication(s) also explained. Patient and/or caregiver was instructed to call if any new symptoms develop prior to next visit. Health risk factors discussed and addressed.     Signed by : Humble Looney M.D.

## 2021-03-02 ENCOUNTER — HOSPITAL ENCOUNTER (OUTPATIENT)
Dept: WOUND CARE | Age: 66
Discharge: HOME | End: 2021-03-02
Payer: COMMERCIAL

## 2021-03-02 LAB
ALBUMIN SERPL-MCNC: 4 G/DL (ref 3.5–5.2)
ALP BLD-CCNC: 76 U/L (ref 35–104)
ALT SERPL-CCNC: 18 U/L (ref 0–32)
ANION GAP SERPL CALCULATED.3IONS-SCNC: 8 MMOL/L (ref 7–16)
AST SERPL-CCNC: 30 U/L (ref 0–31)
ATYPICAL LYMPHOCYTE RELATIVE PERCENT: 0.9 % (ref 0–4)
BASOPHILS ABSOLUTE: 0 E9/L (ref 0–0.2)
BASOPHILS RELATIVE PERCENT: 0.2 % (ref 0–2)
BILIRUB SERPL-MCNC: 0.3 MG/DL (ref 0–1.2)
BUN BLDV-MCNC: 20 MG/DL (ref 8–23)
CALCIUM SERPL-MCNC: 8.9 MG/DL (ref 8.6–10.2)
CHLORIDE BLD-SCNC: 93 MMOL/L (ref 98–107)
CO2: 37 MMOL/L (ref 22–29)
CREAT SERPL-MCNC: 1.4 MG/DL (ref 0.5–1)
EOSINOPHILS ABSOLUTE: 0.12 E9/L (ref 0.05–0.5)
EOSINOPHILS RELATIVE PERCENT: 1.8 % (ref 0–6)
GFR AFRICAN AMERICAN: 46
GFR NON-AFRICAN AMERICAN: 46 ML/MIN/1.73
GLUCOSE BLD-MCNC: 74 MG/DL (ref 74–99)
HCT VFR BLD CALC: 34.7 % (ref 34–48)
HEMOGLOBIN: 9.7 G/DL (ref 11.5–15.5)
LYMPHOCYTES ABSOLUTE: 0.38 E9/L (ref 1.5–4)
LYMPHOCYTES RELATIVE PERCENT: 5.3 % (ref 20–42)
MAGNESIUM: 2.2 MG/DL (ref 1.6–2.6)
MCH RBC QN AUTO: 25.7 PG (ref 26–35)
MCHC RBC AUTO-ENTMCNC: 28 % (ref 32–34.5)
MCV RBC AUTO: 92 FL (ref 80–99.9)
MONOCYTES ABSOLUTE: 0.51 E9/L (ref 0.1–0.95)
MONOCYTES RELATIVE PERCENT: 8 % (ref 2–12)
MYELOCYTE PERCENT: 0.9 % (ref 0–0)
NEUTROPHILS ABSOLUTE: 5.38 E9/L (ref 1.8–7.3)
NEUTROPHILS RELATIVE PERCENT: 83.2 % (ref 43–80)
PDW BLD-RTO: 14.1 FL (ref 11.5–15)
PHOSPHORUS: 3.4 MG/DL (ref 2.5–4.5)
PLATELET # BLD: 183 E9/L (ref 130–450)
PMV BLD AUTO: 12.4 FL (ref 7–12)
POTASSIUM SERPL-SCNC: 4.2 MMOL/L (ref 3.5–5)
RBC # BLD: 3.77 E12/L (ref 3.5–5.5)
RBC # BLD: NORMAL 10*6/UL
SODIUM BLD-SCNC: 138 MMOL/L (ref 132–146)
TOTAL PROTEIN: 7 G/DL (ref 6.4–8.3)
TSH SERPL DL<=0.05 MIU/L-ACNC: 0.3 UIU/ML (ref 0.27–4.2)
URIC ACID, SERUM: 7.1 MG/DL (ref 2.4–5.7)
WBC # BLD: 6.4 E9/L (ref 4.5–11.5)

## 2021-03-03 RX ORDER — ESCITALOPRAM OXALATE 10 MG/1
20 TABLET ORAL DAILY
Qty: 60 TABLET | Refills: 2 | Status: SHIPPED
Start: 2021-03-03 | End: 2021-06-18 | Stop reason: SDUPTHER

## 2021-03-04 DIAGNOSIS — G89.29 CHRONIC BILATERAL LOW BACK PAIN WITHOUT SCIATICA: ICD-10-CM

## 2021-03-04 DIAGNOSIS — M54.50 CHRONIC BILATERAL LOW BACK PAIN WITHOUT SCIATICA: ICD-10-CM

## 2021-03-04 DIAGNOSIS — F11.90 CHRONIC, CONTINUOUS USE OF OPIOIDS: ICD-10-CM

## 2021-03-04 DIAGNOSIS — M17.0 PRIMARY OSTEOARTHRITIS OF BOTH KNEES: ICD-10-CM

## 2021-03-04 RX ORDER — OXYCODONE AND ACETAMINOPHEN 7.5; 325 MG/1; MG/1
1 TABLET ORAL EVERY 6 HOURS PRN
Qty: 120 TABLET | Refills: 0 | Status: SHIPPED
Start: 2021-03-04 | End: 2021-04-02 | Stop reason: SDUPTHER

## 2021-03-04 NOTE — TELEPHONE ENCOUNTER
Last Appointment:  3/1/2021  Future Appointments   Date Time Provider Mauri Sanford   3/8/2021  2:00 PM Sushant Dewitt MD [facility] GEN SURG [facility]   4/8/2021  2:30 PM Amada Mir MD [facility]   5/17/2021  3:20 PM MD Mike Boykin [facility]

## 2021-03-08 ENCOUNTER — OFFICE VISIT (OUTPATIENT)
Dept: SURGERY | Age: 66
End: 2021-03-08
Payer: COMMERCIAL

## 2021-03-08 VITALS
DIASTOLIC BLOOD PRESSURE: 77 MMHG | SYSTOLIC BLOOD PRESSURE: 122 MMHG | RESPIRATION RATE: 18 BRPM | HEART RATE: 63 BPM | TEMPERATURE: 97.1 F

## 2021-03-08 DIAGNOSIS — K64.2 PROLAPSED INTERNAL HEMORRHOIDS, GRADE 3: Primary | ICD-10-CM

## 2021-03-08 PROCEDURE — G8484 FLU IMMUNIZE NO ADMIN: HCPCS | Performed by: SURGERY

## 2021-03-08 PROCEDURE — 99213 OFFICE O/P EST LOW 20 MIN: CPT | Performed by: SURGERY

## 2021-03-08 PROCEDURE — 1036F TOBACCO NON-USER: CPT | Performed by: SURGERY

## 2021-03-08 PROCEDURE — G8427 DOCREV CUR MEDS BY ELIG CLIN: HCPCS | Performed by: SURGERY

## 2021-03-08 PROCEDURE — 3017F COLORECTAL CA SCREEN DOC REV: CPT | Performed by: SURGERY

## 2021-03-08 PROCEDURE — G8417 CALC BMI ABV UP PARAM F/U: HCPCS | Performed by: SURGERY

## 2021-03-08 RX ORDER — SODIUM CHLORIDE 9 MG/ML
INJECTION, SOLUTION INTRAVENOUS CONTINUOUS
Status: CANCELLED | OUTPATIENT
Start: 2021-03-08

## 2021-03-08 NOTE — PROGRESS NOTES
Progress Note - Follow up    Patient's Name/Date of Birth: Maribel Wilson / 1955    Date: 3/8/2021    PCP: Tyrone Cannon MD    Referring Physician:   Ashley Alston MD  [phone]    Chief Complaint   Patient presents with    Hemorrhoids       HPI:    The patient said she continues to have issues with her hemorrhoids. She said they bleed and she has to push them back in. They are doing ok today. Patient's medications, allergies, past medical, surgical, social and family histories were reviewed and updated as appropriate. Review of Systems  Constitutional: negative  Respiratory: negative  Cardiovascular: negative  Gastrointestinal: as in hpoi  Genitourinary:negative  Integument/breast: as in hpi    Physical Exam:  /77   Pulse 63   Temp 97.1 °F (36.2 °C) (Temporal)   Resp 18   General appearance: alert, cooperative and in no acute distress. Lungs: normal work of breathing  Heart: regular rate  Abdomen: soft, nontender, nondistended  Musculoskeletal: symmetrical without edema. Skin: small external hemorrhoids, no prolapsed hemorrhoids        Impression/Plan:  60y.o. year old female with grade 2/3 hemorrhoids, s/p open hemorrhoidectomy, anticoagulated on Eliquis    Sigmoidoscopy and Hemorrhoid banding  I explained the risks, benefits, alternatives, and potential complications associated with the above procedure to be performed and transfusions when applicable with the patient/responsible person prior to the procedure. All of the patient's questions were answered. The patient understands and agrees to surgery.          Electronically by Ashu Stone MD, General Surgery  on 3/8/2021 at 2:54 PM      Send copy of H&P to PCP, Tyrone Cannon MD and referring physician, Ashley Alston MD      3/8/2021

## 2021-03-09 ENCOUNTER — TELEPHONE (OUTPATIENT)
Dept: SURGERY | Age: 66
End: 2021-03-09

## 2021-03-09 NOTE — TELEPHONE ENCOUNTER
Prior Authorization Form:      DEMOGRAPHICS:                     Patient Name:  Keri Hopkins  Patient :  1955            Insurance:  Payor: [facility] [address] / Plan: [employer] / Product Type: *No Product type* /   Insurance ID Number:    Payor/Plan Subscr  Sex Relation Sub.  Ins. ID Effective Group Num   [facility] 1955 [facility] Self [id number]                                    [id number]         DIAGNOSIS & PROCEDURE:                       Procedure/Operation: Sigmoidoscopy hemorrhoid banding            CPT Code: 38102    Diagnosis:  Hemorrhoid     ICD10 Code: K64.4    Location:  [facility]    Surgeon:  Arlene Crystal    SCHEDULING INFORMATION:                          Date: 3/19/21     Time: 1000             Anesthesia:  General                                                       Status:  Outpatient        Special Comments:        Electronically signed by Swati Amaya MA on 3/9/2021 at 2:25 PM

## 2021-03-10 ENCOUNTER — TELEPHONE (OUTPATIENT)
Dept: SURGERY | Age: 66
End: 2021-03-10

## 2021-03-11 ENCOUNTER — ANESTHESIA EVENT (OUTPATIENT)
Dept: ENDOSCOPY | Age: 66
End: 2021-03-11
Payer: COMMERCIAL

## 2021-03-11 NOTE — PROGRESS NOTES
Reviewed with Dr Amy Jaramillo patient pulmonary and cardiac history, reviewed virtual visit on 6/23/2020 with Dr Aniya Bernabe, did not follow up in 3 months as noted D/T \"covid\". She will be evaluated day of procedure by anesthesia, no additional orders.

## 2021-03-11 NOTE — PROGRESS NOTES
[facility] PRE-ADMISSION TESTING INSTRUCTIONS    The Preadmission Testing patient is instructed accordingly using the following criteria (check applicable):    ARRIVAL INSTRUCTIONS:  [x] Parking the day of Surgery is located in the Main Entrance lot. Upon entering the door, make an immediate right to the surgery reception desk    [x] Bring photo ID and insurance card    [] Bring in a copy of Living will or Durable Power of  papers. [x] Please be sure to arrange for responsible adult to provide transportation to and from the hospital    [x] Please arrange for responsible adult to be with you for the 24 hour period post procedure due to having anesthesia      GENERAL INSTRUCTIONS:    [x] Nothing by mouth after midnight, including gum, candy, mints or water    [x] You may brush your teeth, but do not swallow any water    [x] Take medications as instructed with 1-2 oz of water    [x] Stop herbal supplements and vitamins 5 days prior to procedure    [x] Follow preop dosing of blood thinners per physician instructions    [] Take 1/2 dose of evening insulin, but no insulin after midnight    [] No oral diabetic medications after midnight    [] If diabetic and have low blood sugar or feel symptomatic, take 1-2oz apple juice only    [] Bring inhalers day of surgery    [] Bring C-PAP/ Bi-Pap day of surgery    [] Bring urine specimen day of surgery    [] Shower or bath with soap, lather and rinse well, AM of Surgery, no lotion, powders or creams to surgical site    [x] Follow bowel prep as instructed per surgeon    [] No tobacco products within 24 hours of surgery     [] No alcohol or illegal drug use within 24 hours of surgery.     [x] Jewelry, body piercing's, eyeglasses, contact lenses and dentures are not permitted into surgery (bring cases)      [x] Please do not wear any nail polish, make up or hair products on the day of surgery    [x] You can expect a call the business day prior to procedure to notify you if your arrival time changes    [x] If you receive a survey after surgery we would greatly appreciate your comments    [] Parent/guardian of a minor must accompany their child and remain on the premises  the entire time they are under our care     [] Pediatric patients may bring favorite toy, blanket or comfort item with them    [] A caregiver or family member must remain with the patient during their stay if they are mentally handicapped, have dementia, disoriented or unable to use a call light or would be a safety concern if left unattended    [x] Please notify surgeon if you develop any illness between now and time of surgery (cold, cough, sore throat, fever, nausea, vomiting) or any signs of infections  including skin, wounds, and dental.    [x]  The Outpatient Pharmacy is available to fill your prescription here on your day of surgery, ask your preop nurse for details    [] Other instructions    EDUCATIONAL MATERIALS PROVIDED:    [] PAT Preoperative Education Packet/Booklet     [] Medication List    [] Transfusion bracelet applied with instructions    [] Shower with soap, lather and rinse well, and use CHG wipes provided the evening before surgery as instructed    [] Incentive spirometer with instructions

## 2021-03-11 NOTE — PROGRESS NOTES
Have you been tested for COVID  No           Have you been told you were positive for COVID No  Have you had any known exposure to someone that is positive for COVID No  Do you have a cough                   No              Do you have shortness of breath No                 Do you have a sore throat            No                Are you having chills                    No                Are you having muscle aches. No                    Please come to the hospital wearing a mask and have your significant other wear a mask as well. Both of you should check your temperature before leaving to come here,  if it is 100 or higher please call [phone] for instruction.

## 2021-03-15 ENCOUNTER — HOSPITAL ENCOUNTER (OUTPATIENT)
Age: 66
Discharge: HOME | End: 2021-03-17
Payer: COMMERCIAL

## 2021-03-15 DIAGNOSIS — Z01.818 PREOP TESTING: ICD-10-CM

## 2021-03-15 DIAGNOSIS — N18.30 STAGE 3 CHRONIC KIDNEY DISEASE, UNSPECIFIED WHETHER STAGE 3A OR 3B CKD (HCC): ICD-10-CM

## 2021-03-15 LAB
BILIRUBIN URINE: NEGATIVE
BLOOD, URINE: NEGATIVE
CLARITY: CLEAR
COLOR: YELLOW
GLUCOSE URINE: NEGATIVE MG/DL
KETONES, URINE: NEGATIVE MG/DL
LEUKOCYTE ESTERASE, URINE: ABNORMAL
NITRITE, URINE: NEGATIVE
PH UA: 8.5 (ref 5–9)
PROTEIN UA: 30 MG/DL
SPECIFIC GRAVITY UA: 1.01 (ref 1–1.03)
UROBILINOGEN, URINE: 0.2 E.U./DL

## 2021-03-15 PROCEDURE — U0003 INFECTIOUS AGENT DETECTION BY NUCLEIC ACID (DNA OR RNA); SEVERE ACUTE RESPIRATORY SYNDROME CORONAVIRUS 2 (SARS-COV-2) (CORONAVIRUS DISEASE [COVID-19]), AMPLIFIED PROBE TECHNIQUE, MAKING USE OF HIGH THROUGHPUT TECHNOLOGIES AS DESCRIBED BY CMS-2020-01-R: HCPCS

## 2021-03-16 LAB
BACTERIA: ABNORMAL /HPF
CRYSTALS, UA: ABNORMAL /HPF
RBC UA: ABNORMAL /HPF (ref 0–2)
WBC UA: >20 /HPF (ref 0–5)

## 2021-03-16 RX ORDER — PREDNISONE 1 MG/1
5 TABLET ORAL DAILY
Qty: 30 TABLET | Refills: 5 | Status: SHIPPED
Start: 2021-03-16 | End: 2021-09-09 | Stop reason: SDUPTHER

## 2021-03-16 NOTE — TELEPHONE ENCOUNTER
Last Appointment:  3/1/2021  Future Appointments   Date Time Provider Mauri Sanford   4/8/2021  2:30 PM Amada Mir  [facility]   5/17/2021  3:20 PM MD Mike Boykin [facility] [place]

## 2021-03-17 DIAGNOSIS — R30.0 DYSURIA: Primary | ICD-10-CM

## 2021-03-18 LAB
SARS-COV-2: NOT DETECTED
SOURCE: NORMAL
URINE CULTURE, ROUTINE: NORMAL

## 2021-03-19 ENCOUNTER — ANESTHESIA (OUTPATIENT)
Dept: ENDOSCOPY | Age: 66
End: 2021-03-19
Payer: COMMERCIAL

## 2021-03-19 ENCOUNTER — HOSPITAL ENCOUNTER (OUTPATIENT)
Age: 66
Setting detail: OUTPATIENT SURGERY
Discharge: HOME | End: 2021-03-19
Attending: SURGERY | Admitting: SURGERY
Payer: COMMERCIAL

## 2021-03-19 VITALS
BODY MASS INDEX: 37.89 KG/M2 | HEART RATE: 78 BPM | WEIGHT: 193 LBS | SYSTOLIC BLOOD PRESSURE: 158 MMHG | HEIGHT: 60 IN | OXYGEN SATURATION: 100 % | DIASTOLIC BLOOD PRESSURE: 99 MMHG | TEMPERATURE: 97.5 F | RESPIRATION RATE: 20 BRPM

## 2021-03-19 VITALS
DIASTOLIC BLOOD PRESSURE: 89 MMHG | OXYGEN SATURATION: 90 % | SYSTOLIC BLOOD PRESSURE: 138 MMHG | RESPIRATION RATE: 24 BRPM

## 2021-03-19 DIAGNOSIS — D86.9 SARCOIDOSIS: ICD-10-CM

## 2021-03-19 DIAGNOSIS — Z01.818 PREOP TESTING: Primary | ICD-10-CM

## 2021-03-19 DIAGNOSIS — J44.9 COPD (CHRONIC OBSTRUCTIVE PULMONARY DISEASE) (HCC): ICD-10-CM

## 2021-03-19 LAB — METER GLUCOSE: 101 MG/DL (ref 74–99)

## 2021-03-19 PROCEDURE — 2580000003 HC RX 258: Performed by: SURGERY

## 2021-03-19 PROCEDURE — 6360000002 HC RX W HCPCS: Performed by: NURSE ANESTHETIST, CERTIFIED REGISTERED

## 2021-03-19 PROCEDURE — 7100000010 HC PHASE II RECOVERY - FIRST 15 MIN: Performed by: SURGERY

## 2021-03-19 PROCEDURE — 82962 GLUCOSE BLOOD TEST: CPT

## 2021-03-19 PROCEDURE — 2709999900 HC NON-CHARGEABLE SUPPLY: Performed by: SURGERY

## 2021-03-19 PROCEDURE — 45330 DIAGNOSTIC SIGMOIDOSCOPY: CPT | Performed by: SURGERY

## 2021-03-19 PROCEDURE — 3700000000 HC ANESTHESIA ATTENDED CARE: Performed by: SURGERY

## 2021-03-19 PROCEDURE — 46221 LIGATION OF HEMORRHOID(S): CPT | Performed by: SURGERY

## 2021-03-19 PROCEDURE — 3609022100 HC SIGMOIDOSCOPY W/ BAND LIGATION(S): Performed by: SURGERY

## 2021-03-19 PROCEDURE — 3700000001 HC ADD 15 MINUTES (ANESTHESIA): Performed by: SURGERY

## 2021-03-19 PROCEDURE — 2500000003 HC RX 250 WO HCPCS: Performed by: NURSE ANESTHETIST, CERTIFIED REGISTERED

## 2021-03-19 PROCEDURE — 7100000011 HC PHASE II RECOVERY - ADDTL 15 MIN: Performed by: SURGERY

## 2021-03-19 RX ORDER — MIDAZOLAM HYDROCHLORIDE 1 MG/ML
INJECTION INTRAMUSCULAR; INTRAVENOUS PRN
Status: DISCONTINUED | OUTPATIENT
Start: 2021-03-19 | End: 2021-03-19 | Stop reason: SDUPTHER

## 2021-03-19 RX ORDER — KETAMINE HYDROCHLORIDE 10 MG/ML
INJECTION, SOLUTION INTRAMUSCULAR; INTRAVENOUS PRN
Status: DISCONTINUED | OUTPATIENT
Start: 2021-03-19 | End: 2021-03-19 | Stop reason: SDUPTHER

## 2021-03-19 RX ORDER — PROPOFOL 10 MG/ML
INJECTION, EMULSION INTRAVENOUS PRN
Status: DISCONTINUED | OUTPATIENT
Start: 2021-03-19 | End: 2021-03-19 | Stop reason: SDUPTHER

## 2021-03-19 RX ORDER — GLYCOPYRROLATE 1 MG/5 ML
SYRINGE (ML) INTRAVENOUS PRN
Status: DISCONTINUED | OUTPATIENT
Start: 2021-03-19 | End: 2021-03-19 | Stop reason: SDUPTHER

## 2021-03-19 RX ORDER — SODIUM CHLORIDE 9 MG/ML
INJECTION, SOLUTION INTRAVENOUS CONTINUOUS
Status: DISCONTINUED | OUTPATIENT
Start: 2021-03-19 | End: 2021-03-19 | Stop reason: HOSPADM

## 2021-03-19 RX ADMIN — Medication 0.2 MG: at 10:27

## 2021-03-19 RX ADMIN — MIDAZOLAM 1 MG: 1 INJECTION INTRAMUSCULAR; INTRAVENOUS at 10:27

## 2021-03-19 RX ADMIN — KETAMINE HYDROCHLORIDE 10 MG: 10 INJECTION INTRAMUSCULAR; INTRAVENOUS at 10:31

## 2021-03-19 RX ADMIN — PROPOFOL 30 MG: 10 INJECTION, EMULSION INTRAVENOUS at 10:34

## 2021-03-19 RX ADMIN — SODIUM CHLORIDE: 9 INJECTION, SOLUTION INTRAVENOUS at 10:25

## 2021-03-19 RX ADMIN — PROPOFOL 40 MG: 10 INJECTION, EMULSION INTRAVENOUS at 10:27

## 2021-03-19 RX ADMIN — KETAMINE HYDROCHLORIDE 20 MG: 10 INJECTION INTRAMUSCULAR; INTRAVENOUS at 10:27

## 2021-03-19 NOTE — ANESTHESIA PRE PROCEDURE
Department of Anesthesiology  Preprocedure Note       Name:  Nelda Moffett   Age:  60 y.o.  :  1955                                          MRN:  [id number]         Date:  3/19/2021      Surgeon: Kinjal Gallego):  Kenna Mary MD    Procedure: Procedure(s):  SIGMOIDOSCOPY HEMORRHOID BANDING    Medications prior to admission:   Prior to Admission medications    Medication Sig Start Date End Date Taking? Authorizing Provider   predniSONE (DELTASONE) 5 MG tablet Take 1 tablet by mouth daily 3/16/21  Yes Mariama Mehta MD   oxyCODONE-acetaminophen (PERCOCET) 7.5-325 MG per tablet Take 1 tablet by mouth every 6 hours as needed for Pain for up to 30 days. Intended supply: 30 days  Patient taking differently: Take 1 tablet by mouth every 6 hours as needed for Pain. Intended supply: 30 days  Instructed to take with sip water am of procedure, if needed. 3/4/21 4/3/21 Yes Mariama Mehta MD   escitalopram (LEXAPRO) 10 MG tablet Take 2 tablets by mouth daily  Patient taking differently: Take 20 mg by mouth daily Takes in the afternoon. 3/3/21  Yes Mariama Mehta MD   docusate sodium (COLACE) 100 MG capsule Take 1 capsule by mouth 2 times daily 3/1/21  Yes Mariama Mehta MD   potassium chloride (KLOR-CON) 10 MEQ extended release tablet Take 1 tablet by mouth daily Not taking   Yes Historical Provider, MD   ferrous sulfate (IRON 325) 325 (65 Fe) MG tablet Take 1 tablet by mouth 2 times daily 21  Yes Mariama Mehta MD   promethazine-Formerly named [facility] WITH CODEINE) 6.25-10 MG/5ML syrup Take 7.5 mLs by mouth 3 times daily as needed for Cough for up to 90 days.  21 Yes Mariama Mehta MD   amLODIPine (NORVASC) 5 MG tablet Take 1 tablet by mouth daily  Patient taking differently: Take 5 mg by mouth daily Instructed to take with sip water am of procedure 21  Yes Mariama Mehta MD   desmopressin (DDAVP) 0.1 MG tablet Take 2 tablets by mouth 2 times daily 21  Yes Mariama Mehta MD mupirocin (BACTROBAN) 2 % ointment Apply topically daily 1/3/21  Yes Justyna Vincent MD   metoprolol succinate (TOPROL XL) 50 MG extended release tablet TAKE ONE TABLET EVERY DAY WITH THE 100MG  Patient taking differently: TAKE ONE TABLET EVERY DAY WITH THE 100MG  Instructed to take with sip water am of procedure 12/31/20  Yes Justyna Vincent MD   omega-3 acid ethyl esters (LOVAZA) 1 g capsule  12/3/20  Yes Nellie Burt MD   Omega 3 1000 MG CAPS Take 1 each by mouth daily 12/3/20  Yes Justyna Vincent MD   cycloSPORINE (RESTASIS) 0.05 % ophthalmic emulsion Place 1 drop into both eyes every 12 hours 11/19/20  Yes Justyna Vincent MD   levothyroxine (SYNTHROID) 75 MCG tablet Take 1 tablet by mouth daily 11/13/20  Yes Justyna Vincent MD   BREO ELLIPTA 100-25 MCG/INH AEPB inhaler Inhale 1 puff into the lungs daily  Patient taking differently: Inhale 1 puff into the lungs daily Use AM day of procedure if taking in the AM. 10/29/20  Yes Justyna Vincent MD   hydrocortisone (ANUSOL-HC) 2.5 % CREA rectal cream Apply to affected area BID 10/13/20  Yes Justyna Vincent MD   apixaban (ELIQUIS) 5 MG TABS tablet Take 1 tablet by mouth 2 times daily  Patient taking differently: Take 5 mg by mouth 2 times daily  to check hold.  8/11/20  Yes Justyna Vincent MD   sodium chloride (ALTAMIST SPRAY) 0.65 % nasal spray 1 spray by Nasal route as needed for Congestion 8/6/20  Yes Gwendolyn Chol, DO   metoprolol succinate (TOPROL XL) 100 MG extended release tablet Take 1 tablet by mouth daily  Patient taking differently: Take 100 mg by mouth daily Instructed to take with sip water am of procedure 6/8/20  Yes Justyna Vincent MD   cimetidine (TAGAMET) 400 MG tablet Take 1 tablet by mouth 2 times daily  Patient taking differently: Take 400 mg by mouth 2 times daily Instructed to take with sip water am of procedure 5/26/20  Yes Justyna Vincent MD   albuterol sulfate HFA (PROVENTIL HFA) 108 (90 Base) MCG/ACT inhaler Inhale 2 puffs into the lungs every 6 hours as needed for Wheezing or Shortness of Breath  Patient taking differently: Inhale 2 puffs into the lungs every 6 hours as needed for Wheezing or Shortness of Breath Use AM day of procedure, if needed. 2/3/20  Yes Griffin Mccarty MD   Menthol, Topical Analgesic, (BIOFREEZE) 4 % GEL Apply BID to affected areas 9/24/18  Yes Griffin Mccarty MD   OXYGEN 4 L/min by Nasal route as needed.  [facility]   Yes Historical Provider, MD quintanillapirocmirna [place]) 2 % ointment Apply topically daily 6/8/20   Griffin Mccarty MD       Current medications:    Current Facility-Administered Medications   Medication Dose Route Frequency Provider Last Rate Last Admin    0.9 % sodium chloride infusion   Intravenous Continuous Katiuska Gotti MD           Allergies:  No Known Allergies    Problem List:    Patient Active Problem List   Diagnosis Code    Chronic back pain M54.9, G89.29    Primary hypothyroidism E03.9    Nephrosclerosis I12.9    Diabetes insipidus, neurohypophyseal ([facility] E23.2    Sarcoidosis D86.9    Steroid-induced avascular necrosis of shoulder ([address] M87.119, T38.0X5A    Anemia due to chronic illness D63.8    Bilateral hip pain M25.551, M25.552    Debility R53.81    Bright red rectal bleeding K62.5    Hypertensive pulmonary vascular disease I27.20    Benign essential hypertension I10    Chronic kidney disease, stage III (moderate) ([facility]) N18.30    PAF (paroxysmal atrial fibrillation) ([address] currently in NSR I48.0    Mixed hyperlipidemia E78.2    Chronic combined systolic and diastolic heart failure (HCC) I50.42    Chronic respiratory failure with hypoxia (HCC) J96.11    Mixed simple and mucopurulent chronic bronchitis (HCC) J41.8    Recurrent major depressive disorder, in partial remission ([address] F33.41    Gait disturbance R26.9    Chronic, continuous use of opioids F11.90       Past Medical History:        Diagnosis Date    A-fib [facility])     follows with Dr Genoveva Fung to transfer from chair to wheelchair     with assistance    Abnormal mammogram 2010    Acute on chronic respiratory failure ([address] 05/05/2017    Anemia     Anemia due to chronic illness     Ankle fracture, left 2008    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[address] 03/26/2007    Backache     Benign hypertension     CHF (congestive heart failure) ([facility])     Chronic back pain     Chronic kidney disease     stage 3    Chronic pain disorder     Chronic, continuous use of opioids     Compression fracture of thoracic vertebra ([facility])     T10    Debility     Deformity of ankle and foot, acquired 01/31/2011    Depression     Diabetes insipidus ([address]     Diverticulitis     Dry eyes     Encephalopathy acute 05/05/2017    Gait disturbance     GERD (gastroesophageal reflux disease)     Hemorrhoids 01/13/2012    Hernia     History of blood transfusion approx 05/2017    History of Clostridium difficile     negative culture 12-15-15; resolved    Hyperlipidemia     Hypothyroidism     Ischemic colitis, enteritis, or enterocolitis ([address]     Long-term current use of steroids     Lumbar disc herniation     Myalgia and myositis, unspecified     Nephrosclerosis     Nonunion of fracture 02/22/2011    Osteoarthritis     severe    PAF (paroxysmal atrial fibrillation) ([facility])     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[facility])     PCWP mean:26.0 PA mean: 24.00; denies any recent issues    Pulmonary hypertension ([facility])     ventricular diastolic function consistent with abnormal relaxation (stage I)    Pulmonary sarcoidosis ([facility])     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, f/u w/ PCP    Rectal bleeding     Rhabdomyolysis 05/09/2011    Steroid-induced avascular necrosis of shoulder ([address]     Right    Tibia fracture 07/2010       Past Surgical History:        Procedure Laterality Date    ABDOMEN SURGERY  2008    ischemic colitis    COLONOSCOPY  2008    healed colitis    COLONOSCOPY  04/11/2014    COLONOSCOPY  11/23/2015    severe colitis    COLONOSCOPY  10/24/2016    COLONOSCOPY  2017    ECHO COMPL W DOP COLOR FLOW  2015         ECHO COMPLETE  2013         FRACTURE SURGERY  2010    Tibial right    HEMORRHOID SURGERY  2016    KYPHOSIS SURGERY      For comp. fx T10    LUMBAR DISC SURGERY      OTHER SURGICAL HISTORY  11    removal r leg external fixator    TIBIA / Jared Muscat LENGTHENING      application TSF  81    UPPER GASTROINTESTINAL ENDOSCOPY  2016    UPPER GASTROINTESTINAL ENDOSCOPY  2017       Social History:    Social History     Tobacco Use    Smoking status: Former Smoker     Packs/day: 0.75     Years: 25.00     Pack years: 18.75     Types: Cigarettes     Start date:      Quit date: 9/10/1996     Years since quittin.5    Smokeless tobacco: Never Used   Substance Use Topics    Alcohol use: No     Alcohol/week: 0.0 standard drinks     Comment: drinks mountain dew and clear pops. \"i drink alot of pop\" maybe 2 liters in a day. Counseling given: Not Answered      Vital Signs (Current):   Vitals:    21 1137 21 0833   BP:  (!) 164/80   Pulse:  72   Resp:  16   Temp:  97.2 °F (36.2 °C)   SpO2:  98%   Weight: 193 lb (87.5 kg)    Height: 5' (1.524 m)                                               BP Readings from Last 3 Encounters:   21 (!) 164/80   21 122/77   21 106/74       NPO Status:                                                   Date of last liquid consumption: 21                        Date of last solid food consumption: 21    BMI:   Wt Readings from Last 3 Encounters:   21 193 lb (87.5 kg)   21 193 lb (87.5 kg)   21 183 lb (83 kg)     Body mass index is 37.69 kg/m².     CBC:   Lab Results   Component Value Date    WBC 6.4 2021    RBC 3.77 2021    HGB 9.7 2021    HCT 34.7 2021    MCV 92.0 2021    RDW 14.1 2021     2021       CMP:   Lab Results   Component Value Date     03/01/2021    K 4.2 03/01/2021    K 4.8 11/14/2019    CL 93 03/01/2021    CO2 37 03/01/2021    BUN 20 03/01/2021    CREATININE 1.4 03/01/2021    GFRAA 46 03/01/2021    LABGLOM 46 03/01/2021    GLUCOSE 74 03/01/2021    GLUCOSE 116 05/02/2012    PROT 7.0 03/01/2021    CALCIUM 8.9 03/01/2021    BILITOT 0.3 03/01/2021    ALKPHOS 76 03/01/2021    AST 30 03/01/2021    ALT 18 03/01/2021       POC Tests: No results for input(s): POCGLU, POCNA, POCK, POCCL, POCBUN, POCHEMO, POCHCT in the last 72 hours.     Coags:   Lab Results   Component Value Date    PROTIME 17.1 02/08/2018    PROTIME 12.7 05/02/2012    INR 1.5 02/08/2018    APTT 31.4 02/08/2018       HCG (If Applicable): No results found for: PREGTESTUR, PREGSERUM, HCG, HCGQUANT     ABGs: No results found for: PHART, PO2ART, ZDP4EON, BUT6HHF, BEART, P7JZSFFE     Type & Screen (If Applicable):  No results found for: LABABO, LABRH    Drug/Infectious Status (If Applicable):  Lab Results   Component Value Date    HEPCAB NON REACT 04/12/2011       COVID-19 Screening (If Applicable):   Lab Results   Component Value Date    COVID19 Not Detected 03/15/2021           Anesthesia Evaluation  Patient summary reviewed no history of anesthetic complications:   Airway: Mallampati: III  TM distance: <3 FB   Neck ROM: full  Mouth opening: < 3 FB Dental:    (+) edentulous      Pulmonary:   (+) decreased breath sounds,                            ROS comment: Pulmonary sarcoidosis   Fibrosis   Former smoker  Continuous oxygen 4 liters    Cardiovascular:    (+) hypertension:, dysrhythmias (history of a-fib):, CHF:, hyperlipidemia        Rhythm: regular             Beta Blocker:  Dose within 24 Hrs         Neuro/Psych:   (+) neuromuscular disease:, depression/anxiety  (depression, in remission )   (-) psychiatric history            ROS comment: Chronic pain  Debility  Gait disturbance  GI/Hepatic/Renal:   (+) GERD:, renal disease: CRI,          ROS comment: Hemorrhoids  Bright red rectal bleeding. Endo/Other:    (+) hypothyroidism::., .                  ROS comment: Long-term current steroid use Abdominal:           Vascular: negative vascular ROS. Anesthesia Plan      MAC     ASA 4       Induction: intravenous. MIPS: Prophylactic antiemetics administered. Anesthetic plan and risks discussed with patient and spouse. Plan discussed with CRNA.             [address],    3/19/2021

## 2021-03-19 NOTE — OP NOTE
Operative Note: Hemorrhoid Banding and Proctoscopy    Natalya Gaines     DATE OF PROCEDURE: 3/19/2021  SURGEON: Surgeon(s):  Laurinda Mohs, MD    PREOPERATIVE DIAGNOSIS: Internal hemorrhoids, bleeding    POSTOPERATIVE DIAGNOSES: Same    OPERATION:   1) Sigmoidoscopy  2) Internal hemorrhoid banding     ANESTHESIA: LMAC    ESTIMATED BLOOD LOSS: Minimal    SPECIMEN: None    COMPLICATIONS: None. BRIEF HISTORY: Natalya Gaines is a 60 y.o.  female who has been complaining of bleeding internal hemorrhoids. I discussed the above procedure with the patient, including the procedure, benefits, risks, and alternatives, and she agreed. PROCEDURE IN DETAIL: The patient was taken to the endoscopy suite and placed in the lateral position, right side up, where [facility] anesthesia was administered. A lubricated scope was passed into the rectum which revealed enlarged internal hemorrhoids. The scope was passed through the sigmoid colon to 60 cm, and then slowly withdrawn, each area was examined again on the way out. There was moderate diverticulosis seen. The scope was retroflexed in the rectum and enlarged internal hemorrhoids were seen. The scope was then removed and a lubricated proctoscope was inserted. The patient was found to have hemorrhoids in the right anterior and left lateral positions. The left lateral hemorrhoid was identified as the most inflamed. Using the rubber band ligator, 2 rubber bands were deployed at the base of the hemorrhoid. The same procedure was also done for the  Right anterior hemorrhoidal complex. Minimal bleeding was seen. The proctoscope was removed. The patient tolerated the procedure well. Sponge, needle, and instrument counts were correct. The patient was awoken from anesthesia and taken to the PACU in stable condition.     Gregorio Grimaldo M.D., F.A.C.S. 3/19/2021 10:39 AM     Send copy of H&P to PCP, Annabelle Lewis MD and referring physician, Martine Bull MD

## 2021-03-19 NOTE — TELEPHONE ENCOUNTER
Last Appointment:  3/1/2021  Future Appointments   Date Time Provider Mauri Sanford   4/8/2021  2:30 PM Abundio Klinefelter,  N [facility]   5/17/2021  3:20 PM MD Imelda Anderson [facility]    Desmopressin is 2 tablets  Twice daily. Patient running out, currently out of medications. She tried calling Dr. Toni Alexander this medication but was told  by his office to call here. I fixed the script please check for correctness.

## 2021-03-19 NOTE — ANESTHESIA POSTPROCEDURE EVALUATION
Department of Anesthesiology  Postprocedure Note    Patient: Francisco Valles  MRN: [id number]  YOB: 1955  Date of evaluation: 3/19/2021  Time:  12:03 PM     Procedure Summary     Date: 03/19/21 Room / Location: [facility] / [facility]    Anesthesia Start: 1025 Anesthesia Stop: 6985    Procedure: SIGMOIDOSCOPY HEMORRHOID BANDING (N/A ) Diagnosis: (HEMORRHOID)    Surgeons: Gene Florez MD Responsible Provider: Diana Muller DO    Anesthesia Type: MAC ASA Status: 4          Anesthesia Type: MAC    Amy Phase I: Amy Score: 10    Amy Phase II: Amy Score: 10    Last vitals: Reviewed and per EMR flowsheets.        Anesthesia Post Evaluation    Patient location during evaluation: PACU  Patient participation: complete - patient participated  Level of consciousness: awake and alert  Airway patency: patent  Nausea & Vomiting: no nausea and no vomiting  Complications: no  Cardiovascular status: hemodynamically stable  Respiratory status: acceptable  Hydration status: euvolemic

## 2021-03-21 RX ORDER — ALBUTEROL SULFATE 2.5 MG/3ML
2.5 SOLUTION RESPIRATORY (INHALATION) EVERY 6 HOURS PRN
Qty: 120 EACH | Refills: 2 | Status: SHIPPED
Start: 2021-03-21 | End: 2021-11-04 | Stop reason: SDUPTHER

## 2021-03-21 RX ORDER — DESMOPRESSIN ACETATE 0.1 MG/1
0.2 TABLET ORAL 2 TIMES DAILY
Qty: 120 TABLET | Refills: 0 | Status: SHIPPED
Start: 2021-03-21 | End: 2021-11-15 | Stop reason: SDUPTHER

## 2021-03-21 RX ORDER — ALBUTEROL SULFATE 90 UG/1
2 AEROSOL, METERED RESPIRATORY (INHALATION) EVERY 6 HOURS PRN
Qty: 1 INHALER | Refills: 3 | Status: SHIPPED
Start: 2021-03-21 | End: 2021-08-16 | Stop reason: SDUPTHER

## 2021-04-02 ENCOUNTER — TELEPHONE (OUTPATIENT)
Dept: FAMILY MEDICINE CLINIC | Age: 66
End: 2021-04-02

## 2021-04-02 DIAGNOSIS — F11.90 CHRONIC, CONTINUOUS USE OF OPIOIDS: ICD-10-CM

## 2021-04-02 DIAGNOSIS — J44.9 COPD, MODERATE (HCC): ICD-10-CM

## 2021-04-02 DIAGNOSIS — M54.50 CHRONIC BILATERAL LOW BACK PAIN WITHOUT SCIATICA: ICD-10-CM

## 2021-04-02 DIAGNOSIS — G89.29 CHRONIC BILATERAL LOW BACK PAIN WITHOUT SCIATICA: ICD-10-CM

## 2021-04-02 DIAGNOSIS — M17.0 PRIMARY OSTEOARTHRITIS OF BOTH KNEES: ICD-10-CM

## 2021-04-02 RX ORDER — CIPROFLOXACIN 250 MG/1
250 TABLET, FILM COATED ORAL 2 TIMES DAILY
COMMUNITY
Start: 2021-03-26 | End: 2021-04-05

## 2021-04-02 RX ORDER — PROMETHAZINE HYDROCHLORIDE AND CODEINE PHOSPHATE 6.25; 1 MG/5ML; MG/5ML
7.5 SYRUP ORAL 3 TIMES DAILY PRN
Qty: 473 ML | Refills: 1 | Status: SHIPPED
Start: 2021-04-02 | End: 2021-05-10 | Stop reason: SDUPTHER

## 2021-04-02 RX ORDER — OXYCODONE AND ACETAMINOPHEN 7.5; 325 MG/1; MG/1
1 TABLET ORAL EVERY 6 HOURS PRN
Qty: 120 TABLET | Refills: 0 | Status: SHIPPED
Start: 2021-04-02 | End: 2021-04-30 | Stop reason: SDUPTHER

## 2021-04-02 RX ORDER — NEBULIZER ACCESSORIES
1 KIT MISCELLANEOUS DAILY PRN
Qty: 1 KIT | Refills: 2 | Status: SHIPPED | OUTPATIENT
Start: 2021-04-02 | End: 2022-08-11

## 2021-04-02 NOTE — TELEPHONE ENCOUNTER
I attempted to call patient and left a message on her machine. You may tell her that there is not strong evidence that Prevagen helps memory. It is probably safe, but expensive. She likely would be wasting her money. On the other hand, we know that narcotics can cause memory loss. She would be better off trying to decrease her narcotic doses (including her cough medication). I can place an order for her supplies, but she needs to tell me where to send it.

## 2021-04-02 NOTE — TELEPHONE ENCOUNTER
Please call or fax order to [facility] for nebulizer tubing kit. Ordered in [address] and printed.

## 2021-04-02 NOTE — TELEPHONE ENCOUNTER
I called pt and informed her of doctors note   Also the order for the tubing is going to go to [facility] I called them and they do carry them I just need the order now  Thank you.

## 2021-04-02 NOTE — TELEPHONE ENCOUNTER
Pt called in wanting advise about a medication she saw on tv for memory, Nils Carter. Is this safe for pt to take? Pt will like to discuss this with her pcp. Pt would also like an order for new tubing for her Nebulizer machine. Pt wants it sent to the sister company of [employer] since [employer] do not have them anymore.

## 2021-04-02 NOTE — TELEPHONE ENCOUNTER
Last Appointment:  3/1/2021  Future Appointments   Date Time Provider Mauri Sanford   4/8/2021  2:30 PM Trevon Granados  N [facility]   4/12/2021  3:50 PM Kristal Fletcher MD [facility] GEN SURG [facility]   5/17/2021  3:20 PM MD Juana Botello [facility]   6/4/2021  3:00 [place] [id number] [address]

## 2021-04-08 ENCOUNTER — TELEPHONE (OUTPATIENT)
Dept: OBGYN | Age: 66
End: 2021-04-08

## 2021-04-12 ENCOUNTER — OFFICE VISIT (OUTPATIENT)
Dept: SURGERY | Age: 66
End: 2021-04-12
Payer: COMMERCIAL

## 2021-04-12 VITALS
DIASTOLIC BLOOD PRESSURE: 65 MMHG | TEMPERATURE: 98.8 F | RESPIRATION RATE: 20 BRPM | HEART RATE: 65 BPM | SYSTOLIC BLOOD PRESSURE: 106 MMHG

## 2021-04-12 DIAGNOSIS — K62.3 RECTAL PROLAPSE: Primary | ICD-10-CM

## 2021-04-12 PROCEDURE — 99214 OFFICE O/P EST MOD 30 MIN: CPT | Performed by: SURGERY

## 2021-04-12 PROCEDURE — G8427 DOCREV CUR MEDS BY ELIG CLIN: HCPCS | Performed by: SURGERY

## 2021-04-12 PROCEDURE — G8417 CALC BMI ABV UP PARAM F/U: HCPCS | Performed by: SURGERY

## 2021-04-12 PROCEDURE — 3017F COLORECTAL CA SCREEN DOC REV: CPT | Performed by: SURGERY

## 2021-04-12 PROCEDURE — 1036F TOBACCO NON-USER: CPT | Performed by: SURGERY

## 2021-04-12 NOTE — PROGRESS NOTES
Progress Note - Follow up    Patient's Name/Date of Birth: Tangela Espinoza / 1955    Date: 4/12/2021    PCP: Gregorio Stein MD    Referring Physician:   Denilson Pappas MD  [phone]    Chief Complaint   Patient presents with    Post-Op Check     hemorrhoid banding        HPI:    The patient said she continues to have issues with something popping out of her butt. She is always able to reduce it. She said she has some bleeding. Patient's medications, allergies, past medical, surgical, social and family histories were reviewed and updated as appropriate. Review of Systems  Constitutional: negative  Respiratory: negative  Cardiovascular: negative  Gastrointestinal: as in hpi  Genitourinary:negative  Integument/breast: negative     Physical Exam:  /65   Pulse 65   Temp 98.8 °F (37.1 °C) (Temporal)   Resp 20   General appearance: alert, cooperative and in no acute distress. Lungs: normal work of breathing  Heart: regular rate  Abdomen: soft, nontender, nondistended  Musculoskeletal: symmetrical without edema. Skin: small amount of mucosal prolapse could be seen on exam     Impression/Plan:  60y.o. year old female with rectal prolapse, history of hemorrhoids s/p banding    I discussed with her that she has prolapse not hemorrhoids that are causing her symptoms. We discussed this at length. I told her there are different surgical options, but I would not recommend the intraperitoneal option given her multiple medical problems. I told her I could refer her to a colorectal surgeon to discuss perianal approaches but she is not interested at this time. I told her that if she has an episode where she cannot reduce the prolapse, she needs to go to the ER.         Electronically by Shayy Cid MD, General Surgery  on 4/12/2021 at 4:45 PM      Send copy of H&P to PCP, Gregorio Stein MD and referring physician, Denilson Pappas MD      4/12/2021

## 2021-04-12 NOTE — PROGRESS NOTES
[place] Carrilloer was seen in [facility] on 4- - she was to be referred to colorectal in [place] - pt stated that she does not want to be scheduled at this time - will call if condition gets worse

## 2021-04-13 NOTE — PROGRESS NOTES
[address] WITH DR. BILLINGS ON 4-  SHE DOES NOT WANT TO BE REFERRED TO [facility] AT THIS TIME WILL CALL IF SHE DECIDES TO BE REFERRED

## 2021-04-30 DIAGNOSIS — M17.0 PRIMARY OSTEOARTHRITIS OF BOTH KNEES: ICD-10-CM

## 2021-04-30 DIAGNOSIS — F11.90 CHRONIC, CONTINUOUS USE OF OPIOIDS: ICD-10-CM

## 2021-04-30 DIAGNOSIS — G89.29 CHRONIC BILATERAL LOW BACK PAIN WITHOUT SCIATICA: ICD-10-CM

## 2021-04-30 DIAGNOSIS — M54.50 CHRONIC BILATERAL LOW BACK PAIN WITHOUT SCIATICA: ICD-10-CM

## 2021-04-30 RX ORDER — OXYCODONE AND ACETAMINOPHEN 7.5; 325 MG/1; MG/1
1 TABLET ORAL EVERY 6 HOURS PRN
Qty: 120 TABLET | Refills: 0 | Status: SHIPPED
Start: 2021-05-02 | End: 2021-05-28 | Stop reason: SDUPTHER

## 2021-04-30 NOTE — TELEPHONE ENCOUNTER
Last Appointment:  3/1/2021  Future Appointments   Date Time Provider Mauri Claribel   5/17/2021  3:20 PM MD Margaret Coleman [facility]   6/11/2021  3:00 PM [place] [id number]

## 2021-05-10 DIAGNOSIS — J44.9 COPD, MODERATE (HCC): ICD-10-CM

## 2021-05-10 RX ORDER — LEVOTHYROXINE SODIUM 0.07 MG/1
75 TABLET ORAL DAILY
Qty: 30 TABLET | Refills: 5 | Status: SHIPPED
Start: 2021-05-10 | End: 2021-11-04 | Stop reason: SDUPTHER

## 2021-05-10 RX ORDER — PROMETHAZINE HYDROCHLORIDE AND CODEINE PHOSPHATE 6.25; 1 MG/5ML; MG/5ML
7.5 SYRUP ORAL 3 TIMES DAILY PRN
Qty: 473 ML | Refills: 1 | Status: SHIPPED
Start: 2021-05-10 | End: 2021-06-25 | Stop reason: SDUPTHER

## 2021-05-28 ENCOUNTER — TELEPHONE (OUTPATIENT)
Dept: FAMILY MEDICINE CLINIC | Age: 66
End: 2021-05-28

## 2021-05-28 DIAGNOSIS — M54.50 CHRONIC BILATERAL LOW BACK PAIN WITHOUT SCIATICA: ICD-10-CM

## 2021-05-28 DIAGNOSIS — F11.90 CHRONIC, CONTINUOUS USE OF OPIOIDS: ICD-10-CM

## 2021-05-28 DIAGNOSIS — M17.0 PRIMARY OSTEOARTHRITIS OF BOTH KNEES: ICD-10-CM

## 2021-05-28 DIAGNOSIS — G89.29 CHRONIC BILATERAL LOW BACK PAIN WITHOUT SCIATICA: ICD-10-CM

## 2021-05-28 RX ORDER — CIMETIDINE 400 MG/1
400 TABLET, FILM COATED ORAL 2 TIMES DAILY
Qty: 180 TABLET | Refills: 3 | Status: ON HOLD
Start: 2021-05-28 | End: 2021-12-14 | Stop reason: HOSPADM

## 2021-05-28 RX ORDER — OXYCODONE AND ACETAMINOPHEN 7.5; 325 MG/1; MG/1
1 TABLET ORAL EVERY 6 HOURS PRN
Qty: 120 TABLET | Refills: 0 | Status: SHIPPED
Start: 2021-05-28 | End: 2021-06-25 | Stop reason: SDUPTHER

## 2021-05-28 RX ORDER — SULFAMETHOXAZOLE AND TRIMETHOPRIM 800; 160 MG/1; MG/1
1 TABLET ORAL 2 TIMES DAILY
Qty: 6 TABLET | Refills: 0 | Status: SHIPPED | OUTPATIENT
Start: 2021-05-28 | End: 2021-05-31

## 2021-05-28 NOTE — TELEPHONE ENCOUNTER
Last Appointment:  3/1/2021  Future Appointments   Date Time Provider Mauri Nicolei   6/11/2021  3:00 PM [place] [address]        Patient asking if 60 g vs 30 g of the mupirocin ointment can be dispensed as she uses it often and is running out please advise.

## 2021-06-07 ENCOUNTER — TELEPHONE (OUTPATIENT)
Dept: PULMONOLOGY | Age: 66
End: 2021-06-07

## 2021-06-07 NOTE — TELEPHONE ENCOUNTER
Called and spoke to a male and informed him that we would be canceling patients apt due to her already being established with Dr. Conrad Rodrigues.

## 2021-06-18 RX ORDER — ESCITALOPRAM OXALATE 10 MG/1
20 TABLET ORAL DAILY
Qty: 60 TABLET | Refills: 2 | Status: SHIPPED
Start: 2021-06-18 | End: 2021-08-31 | Stop reason: SDUPTHER

## 2021-06-18 RX ORDER — FLUTICASONE FUROATE AND VILANTEROL TRIFENATATE 100; 25 UG/1; UG/1
1 POWDER RESPIRATORY (INHALATION) DAILY
Qty: 28 EACH | Refills: 5 | Status: SHIPPED
Start: 2021-06-18 | End: 2021-11-04 | Stop reason: SDUPTHER

## 2021-06-21 RX ORDER — METOPROLOL SUCCINATE 100 MG/1
100 TABLET, EXTENDED RELEASE ORAL DAILY
Qty: 90 TABLET | Refills: 3 | Status: SHIPPED
Start: 2021-06-21 | End: 2022-06-10 | Stop reason: SDUPTHER

## 2021-06-21 RX ORDER — METOPROLOL SUCCINATE 50 MG/1
TABLET, EXTENDED RELEASE ORAL
Qty: 90 TABLET | Refills: 3 | Status: SHIPPED
Start: 2021-06-21 | End: 2022-06-10 | Stop reason: SDUPTHER

## 2021-06-25 DIAGNOSIS — F11.90 CHRONIC, CONTINUOUS USE OF OPIOIDS: ICD-10-CM

## 2021-06-25 DIAGNOSIS — G89.29 CHRONIC BILATERAL LOW BACK PAIN WITHOUT SCIATICA: ICD-10-CM

## 2021-06-25 DIAGNOSIS — M54.50 CHRONIC BILATERAL LOW BACK PAIN WITHOUT SCIATICA: ICD-10-CM

## 2021-06-25 DIAGNOSIS — J44.9 COPD, MODERATE (HCC): ICD-10-CM

## 2021-06-25 DIAGNOSIS — M17.0 PRIMARY OSTEOARTHRITIS OF BOTH KNEES: ICD-10-CM

## 2021-06-25 RX ORDER — PROMETHAZINE HYDROCHLORIDE AND CODEINE PHOSPHATE 6.25; 1 MG/5ML; MG/5ML
7.5 SYRUP ORAL 3 TIMES DAILY PRN
Qty: 473 ML | Refills: 1 | Status: SHIPPED
Start: 2021-06-25 | End: 2021-08-03 | Stop reason: SDUPTHER

## 2021-06-25 RX ORDER — OXYCODONE AND ACETAMINOPHEN 7.5; 325 MG/1; MG/1
1 TABLET ORAL EVERY 6 HOURS PRN
Qty: 120 TABLET | Refills: 0 | Status: SHIPPED
Start: 2021-06-25 | End: 2021-07-23 | Stop reason: SDUPTHER

## 2021-07-05 NOTE — TELEPHONE ENCOUNTER
She will need to see someone here for this. We can't be sure what is going on without an exam.  If she has significant pain, fever, or other severe symptoms, she could be seen today or tomorrow. ACP

## 2021-07-15 ENCOUNTER — HOSPITAL ENCOUNTER (EMERGENCY)
Age: 66
Discharge: HOME | End: 2021-07-15
Attending: EMERGENCY MEDICINE
Payer: COMMERCIAL

## 2021-07-15 VITALS
OXYGEN SATURATION: 96 % | BODY MASS INDEX: 35.34 KG/M2 | HEIGHT: 60 IN | WEIGHT: 180 LBS | RESPIRATION RATE: 16 BRPM | HEART RATE: 62 BPM | TEMPERATURE: 97.3 F | DIASTOLIC BLOOD PRESSURE: 85 MMHG | SYSTOLIC BLOOD PRESSURE: 131 MMHG

## 2021-07-15 DIAGNOSIS — R04.0 EPISTAXIS: Primary | ICD-10-CM

## 2021-07-15 DIAGNOSIS — N28.9 RENAL INSUFFICIENCY: ICD-10-CM

## 2021-07-15 LAB
ALBUMIN SERPL-MCNC: 3.9 G/DL (ref 3.5–5.2)
ALP BLD-CCNC: 64 U/L (ref 35–104)
ALT SERPL-CCNC: 9 U/L (ref 0–32)
ANION GAP SERPL CALCULATED.3IONS-SCNC: 11 MMOL/L (ref 7–16)
AST SERPL-CCNC: 18 U/L (ref 0–31)
BASOPHILS ABSOLUTE: 0.02 E9/L (ref 0–0.2)
BASOPHILS RELATIVE PERCENT: 0.3 % (ref 0–2)
BILIRUB SERPL-MCNC: <0.2 MG/DL (ref 0–1.2)
BUN BLDV-MCNC: 19 MG/DL (ref 6–23)
CALCIUM SERPL-MCNC: 8.9 MG/DL (ref 8.6–10.2)
CHLORIDE BLD-SCNC: 89 MMOL/L (ref 98–107)
CO2: 35 MMOL/L (ref 22–29)
CREAT SERPL-MCNC: 2.1 MG/DL (ref 0.5–1)
EOSINOPHILS ABSOLUTE: 0.17 E9/L (ref 0.05–0.5)
EOSINOPHILS RELATIVE PERCENT: 2.9 % (ref 0–6)
GFR AFRICAN AMERICAN: 29
GFR NON-AFRICAN AMERICAN: 29 ML/MIN/1.73
GLUCOSE BLD-MCNC: 89 MG/DL (ref 74–99)
HCT VFR BLD CALC: 31.8 % (ref 34–48)
HEMOGLOBIN: 9.5 G/DL (ref 11.5–15.5)
IMMATURE GRANULOCYTES #: 0.01 E9/L
IMMATURE GRANULOCYTES %: 0.2 % (ref 0–5)
LYMPHOCYTES ABSOLUTE: 0.77 E9/L (ref 1.5–4)
LYMPHOCYTES RELATIVE PERCENT: 13.2 % (ref 20–42)
MAGNESIUM: 1.9 MG/DL (ref 1.6–2.6)
MCH RBC QN AUTO: 25.1 PG (ref 26–35)
MCHC RBC AUTO-ENTMCNC: 29.9 % (ref 32–34.5)
MCV RBC AUTO: 84.1 FL (ref 80–99.9)
MONOCYTES ABSOLUTE: 0.58 E9/L (ref 0.1–0.95)
MONOCYTES RELATIVE PERCENT: 9.9 % (ref 2–12)
NEUTROPHILS ABSOLUTE: 4.3 E9/L (ref 1.8–7.3)
NEUTROPHILS RELATIVE PERCENT: 73.5 % (ref 43–80)
PDW BLD-RTO: 14.3 FL (ref 11.5–15)
PLATELET # BLD: 186 E9/L (ref 130–450)
PMV BLD AUTO: 9.9 FL (ref 7–12)
POTASSIUM REFLEX MAGNESIUM: 3.4 MMOL/L (ref 3.5–5)
RBC # BLD: 3.78 E12/L (ref 3.5–5.5)
SODIUM BLD-SCNC: 135 MMOL/L (ref 132–146)
TOTAL PROTEIN: 6.8 G/DL (ref 6.4–8.3)
WBC # BLD: 5.9 E9/L (ref 4.5–11.5)

## 2021-07-15 PROCEDURE — 6370000000 HC RX 637 (ALT 250 FOR IP): Performed by: STUDENT IN AN ORGANIZED HEALTH CARE EDUCATION/TRAINING PROGRAM

## 2021-07-15 PROCEDURE — 99284 EMERGENCY DEPT VISIT MOD MDM: CPT

## 2021-07-15 PROCEDURE — C9046 COCAINE HCL NASAL SOLUTION: HCPCS | Performed by: STUDENT IN AN ORGANIZED HEALTH CARE EDUCATION/TRAINING PROGRAM

## 2021-07-15 PROCEDURE — 83735 ASSAY OF MAGNESIUM: CPT

## 2021-07-15 PROCEDURE — 30901 CONTROL OF NOSEBLEED: CPT

## 2021-07-15 PROCEDURE — 85025 COMPLETE CBC W/AUTO DIFF WBC: CPT

## 2021-07-15 PROCEDURE — 80053 COMPREHEN METABOLIC PANEL: CPT

## 2021-07-15 RX ORDER — CEPHALEXIN 500 MG/1
500 CAPSULE ORAL 2 TIMES DAILY
Qty: 14 CAPSULE | Refills: 0 | Status: SHIPPED | OUTPATIENT
Start: 2021-07-15 | End: 2021-07-22

## 2021-07-15 RX ORDER — COCAINE HYDROCHLORIDE 40 MG/ML
SOLUTION NASAL ONCE
Status: COMPLETED | OUTPATIENT
Start: 2021-07-15 | End: 2021-07-15

## 2021-07-15 RX ORDER — OXYMETAZOLINE HYDROCHLORIDE 0.05 G/100ML
2 SPRAY NASAL ONCE
Status: COMPLETED | OUTPATIENT
Start: 2021-07-15 | End: 2021-07-15

## 2021-07-15 RX ADMIN — Medication 2 SPRAY: at 06:06

## 2021-07-15 RX ADMIN — COCAINE HYDROCHLORIDE NASAL: 40 SOLUTION TOPICAL at 06:05

## 2021-07-15 ASSESSMENT — PAIN DESCRIPTION - DESCRIPTORS: DESCRIPTORS: ACHING

## 2021-07-15 ASSESSMENT — PAIN DESCRIPTION - PAIN TYPE: TYPE: CHRONIC PAIN

## 2021-07-15 ASSESSMENT — ENCOUNTER SYMPTOMS
SHORTNESS OF BREATH: 0
DIARRHEA: 0
SORE THROAT: 0
COUGH: 0
VOMITING: 0
CHEST TIGHTNESS: 0
ABDOMINAL DISTENTION: 0
ABDOMINAL PAIN: 0
NAUSEA: 0
BACK PAIN: 0

## 2021-07-15 ASSESSMENT — PAIN DESCRIPTION - ORIENTATION: ORIENTATION: RIGHT;LEFT

## 2021-07-15 ASSESSMENT — PAIN SCALES - GENERAL: PAINLEVEL_OUTOF10: 3

## 2021-07-15 ASSESSMENT — PAIN DESCRIPTION - LOCATION: LOCATION: LEG

## 2021-07-15 NOTE — ED NOTES
Bed: 05  Expected date:   Expected time:   Means of arrival:   Comments:     Everton Mills RN  07/15/21 0106

## 2021-07-15 NOTE — ED PROVIDER NOTES
HPI     Patient is a 60 y.o. female presents with a chief complaint of nose bleeds  This has been occurring for a few hours. Patient states that it gets better with nothing. Patient states that it gets worse with nothing. Patient states that it is severe in severity. Patient resents with chief complaint of nosebleed. Patient is on oxygen chronically at home. Patient has a history of A. fib and takes Eliquis for this. Patient has a small nosebleed that started earlier today. Patient had EMS arrived at which time they placed pressure and then improved her symptoms. Patient then started bleeding again. Patient denies any other systemic symptoms. Patient denies any fevers, chills, nausea, vomiting, chest pain, shortness breath, abdominal pain, changes in urinary or bowel habits. Review of Systems   Constitutional: Negative for activity change, appetite change, chills, fatigue and fever. HENT: Positive for nosebleeds. Negative for congestion, drooling and sore throat. Respiratory: Negative for cough, chest tightness and shortness of breath. Cardiovascular: Negative for chest pain and palpitations. Gastrointestinal: Negative for abdominal distention, abdominal pain, diarrhea, nausea and vomiting. Genitourinary: Negative for decreased urine volume, difficulty urinating, enuresis, flank pain, frequency and hematuria. Musculoskeletal: Negative for arthralgias, back pain and neck stiffness. Skin: Negative for rash and wound. Neurological: Negative for dizziness, facial asymmetry, light-headedness and headaches. Psychiatric/Behavioral: Negative for agitation, confusion and decreased concentration. Physical Exam  Vitals and nursing note reviewed. Constitutional:       Appearance: She is well-developed. HENT:      Head: Normocephalic and atraumatic. Nose: No congestion. Comments: Large blood clots from the left nare.      Mouth/Throat:      Comments: Small amount of active bleeding down the back of patient's throat. Eyes:      Conjunctiva/sclera: Conjunctivae normal.   Cardiovascular:      Rate and Rhythm: Normal rate and regular rhythm. Heart sounds: Normal heart sounds. No murmur heard. Pulmonary:      Effort: Pulmonary effort is normal. No respiratory distress. Breath sounds: Normal breath sounds. No wheezing or rales. Abdominal:      General: Bowel sounds are normal.      Palpations: Abdomen is soft. Tenderness: There is no abdominal tenderness. There is no guarding or rebound. Musculoskeletal:      Cervical back: Normal range of motion and neck supple. Skin:     General: Skin is warm and dry. Neurological:      Mental Status: She is alert and oriented to person, place, and time. Cranial Nerves: No cranial nerve deficit. Coordination: Coordination normal.          Epistaxis Mgmt    Date/Time: 7/15/2021 6:17 AM  Performed by: Medardo Campbell MD  Authorized by: Hector Saavedra MD     Consent:     Consent obtained:  Verbal    Consent given by:  Patient    Risks discussed:  Bleeding, nasal injury and infection    Alternatives discussed:  No treatment  Anesthesia (see MAR for exact dosages): Anesthesia method:  None  Procedure details:     Treatment site:  L septum    Treatment method:  Anterior pack    Treatment complexity:  Limited    Treatment episode: recurring    Post-procedure details:     Assessment:  Bleeding stopped    Patient tolerance of procedure: Tolerated well, no immediate complications         MDM     ED Course as of Jul 15 0637   Thu Jul 15, 2021   0202 Patient had Afrin and cocaine placed. [JM]   9680 Patient still having a small amount of nose bleeding. [JM]   8432 Patient reevaluated and has a small amount of postnasal bleeding. Patient states that she does not feel any bleeding or now. [JM]   9794 Patient reevaluated    [JM]   5865 Patient was evaluated and has no bleeding noted at this time.   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), Backache, Benign hypertension, CHF (congestive heart failure) ([facility]), Chronic back pain, Chronic kidney disease, Chronic pain disorder, Chronic, continuous use of opioids, Compression fracture of thoracic vertebra (HCC), Debility, Deformity of ankle and foot, acquired, Depression, Diabetes insipidus ([address] Diverticulitis, Dry eyes, Encephalopathy acute, Gait disturbance, GERD (gastroesophageal reflux disease), Hemorrhoids, Hernia, History of blood transfusion, History of Clostridium difficile, Hyperlipidemia, Hypothyroidism, Ischemic colitis, enteritis, or enterocolitis ([address] Long-term current use of steroids, Lumbar disc herniation, Myalgia and myositis, unspecified, Nephrosclerosis, Nonunion of fracture, Osteoarthritis, PAF (paroxysmal atrial fibrillation) ([address] 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[address] Pulmonary hypertension ([address] Pulmonary sarcoidosis ([address] Rectal bleeding, Rhabdomyolysis, Steroid-induced avascular necrosis of shoulder ([address] and Tibia fracture. Past Surgical History:  has a past surgical history that includes fracture surgery (2010); Kyphosis surgery; Lumbar disc surgery; Tibia / fibia lengthening; other surgical history (11/1/11); Abdomen surgery (2008); Echo Complete (4/22/2013); ECHO Compl W Dop Color Flow (8/16/2015); Upper gastrointestinal endoscopy (1/4/2016); Hemorrhoid surgery (12/08/2016); Colonoscopy (2008); Colonoscopy (04/11/2014); Colonoscopy (11/23/2015); Colonoscopy (10/24/2016); Colonoscopy (07/26/2017); Upper gastrointestinal endoscopy (07/26/2017); and sigmoidoscopy (N/A, 3/19/2021). Social History:  reports that she quit smoking about 24 years ago. Her smoking use included cigarettes. She started smoking about 50 years ago. She has a 18.75 pack-year smoking history.  She has never used smokeless tobacco. She reports that she does not drink alcohol and does not use drugs. Family History: family history includes Alcohol Abuse in her sister; Arthritis in her father and mother; Diabetes in her father and mother; High Blood Pressure in her father, mother, and sister; Substance Abuse in her brother and sister. The patients home medications have been reviewed. Allergies: Patient has no known allergies.     -------------------------------------------------- RESULTS -------------------------------------------------  Labs:  Results for orders placed or performed during the hospital encounter of 07/15/21   CBC Auto Differential   Result Value Ref Range    WBC 5.9 4.5 - 11.5 E9/L    RBC 3.78 3.50 - 5.50 E12/L    Hemoglobin 9.5 (L) 11.5 - 15.5 g/dL    Hematocrit 31.8 (L) 34.0 - 48.0 %    MCV 84.1 80.0 - 99.9 fL    MCH 25.1 (L) 26.0 - 35.0 pg    MCHC 29.9 (L) 32.0 - 34.5 %    RDW 14.3 11.5 - 15.0 fL    Platelets 049 194 - 113 E9/L    MPV 9.9 7.0 - 12.0 fL    Neutrophils % 73.5 43.0 - 80.0 %    Immature Granulocytes % 0.2 0.0 - 5.0 %    Lymphocytes % 13.2 (L) 20.0 - 42.0 %    Monocytes % 9.9 2.0 - 12.0 %    Eosinophils % 2.9 0.0 - 6.0 %    Basophils % 0.3 0.0 - 2.0 %    Neutrophils Absolute 4.30 1.80 - 7.30 E9/L    Immature Granulocytes # 0.01 E9/L    Lymphocytes Absolute 0.77 (L) 1.50 - 4.00 E9/L    Monocytes Absolute 0.58 0.10 - 0.95 E9/L    Eosinophils Absolute 0.17 0.05 - 0.50 E9/L    Basophils Absolute 0.02 0.00 - 0.20 E9/L   Comprehensive Metabolic Panel w/ Reflex to MG   Result Value Ref Range    Sodium 135 132 - 146 mmol/L    Potassium reflex Magnesium 3.4 (L) 3.5 - 5.0 mmol/L    Chloride 89 (L) 98 - 107 mmol/L    CO2 35 (H) 22 - 29 mmol/L    Anion Gap 11 7 - 16 mmol/L    Glucose 89 74 - 99 mg/dL    BUN 19 6 - 23 mg/dL    CREATININE 2.1 (H) 0.5 - 1.0 mg/dL    GFR Non-African American 29 >=60 mL/min/1.73    GFR African American 29     Calcium 8.9 8.6 - 10.2 mg/dL    Total Protein 6.8 6.4 - 8.3 g/dL    Albumin 3.9 3.5 - 5.2 g/dL    Total Bilirubin <0.2 0.0 - 1.2 mg/dL Alkaline Phosphatase 64 35 - 104 U/L    ALT 9 0 - 32 U/L    AST 18 0 - 31 U/L   Magnesium   Result Value Ref Range    Magnesium 1.9 1.6 - 2.6 mg/dL       Radiology:  No orders to display       ------------------------- NURSING NOTES AND VITALS REVIEWED ---------------------------  Date / Time Roomed:  7/15/2021  1:06 AM  ED Bed Assignment:  05/05    The nursing notes within the ED encounter and vital signs as below have been reviewed. /85   Pulse 62   Temp 97.3 °F (36.3 °C) (Temporal)   Resp 16   Ht 5' (1.524 m)   Wt 180 lb (81.6 kg)   SpO2 96%   BMI 35.15 kg/m²   Oxygen Saturation Interpretation: Normal      ------------------------------------------ PROGRESS NOTES ------------------------------------------  6:18 AM EDT  I have spoken with the patient and discussed todays results, in addition to providing specific details for the plan of care and counseling regarding the diagnosis and prognosis. Their questions are answered at this time and they are agreeable with the plan. I discussed at length with them reasons for immediate return here for re evaluation. They will followup with their primary care physician by calling their office tomorrow. --------------------------------- ADDITIONAL PROVIDER NOTES ---------------------------------  At this time the patient is without objective evidence of an acute process requiring hospitalization or inpatient management. They have remained hemodynamically stable throughout their entire ED visit and are stable for discharge with outpatient follow-up. The plan has been discussed in detail and they are aware of the specific conditions for emergent return, as well as the importance of follow-up. Discharge Medication List as of 7/15/2021  5:54 AM      START taking these medications    Details   cephALEXin (KEFLEX) 500 MG capsule Take 1 capsule by mouth 2 times daily for 7 days, Disp-14 capsule, R-0Normal             Diagnosis:  1. Epistaxis    2. Renal insufficiency        Disposition:  Patient's disposition: Discharge to home  Patient's condition is stable. Kiana Roberts MD  Resident  07/15/21 [id number]    ATTENDING PROVIDER ATTESTATION:     I have personally performed and/or participated in the history, exam, medical decision making, and procedures and agree with all pertinent clinical information. I have also reviewed and agree with the past medical, family and social history unless otherwise noted. I have discussed this patient in detail with the resident, and provided the instruction and education regarding nosebleed. My findings/Plan: I was the primary provider for patient. Patient presenting here because of nosebleed from left side of nose. Patient is on anticoagulant. Patient reporting no chest pain no shortness of breath shortness no abdominal pain or vomiting. Patient does wear oxygen at home. Patient reporting no fever chills. Patient awake alert oriented x3 heart lung exam normal abdomen soft nontender there is noted gross blood from left side of nose. Posterior pharynx also noted to have blood. While here in the emergency department. I did place a 4% cocaine in the left side of nose. Patient had cotton swab removed from nose and there was still some noted residual blood in the left side of nose. Patient had Merocel packing placed into left side of nose canal.  Patient tolerated well. Patient rechecked and no active bleeding posterior pharynx is clear patient in no distress here she was made aware of lab results and findings. She was made aware of her creatinine being elevated as well. Patient to follow-up with nephrology I did speak to  and patient to follow-up in the office. Patient made aware and comfortable with plan.          Barrett Alejandre MD  07/15/21 [address], [place]  07/15/21 2709

## 2021-07-23 ENCOUNTER — TELEPHONE (OUTPATIENT)
Dept: FAMILY MEDICINE CLINIC | Age: 66
End: 2021-07-23

## 2021-07-23 DIAGNOSIS — G89.29 CHRONIC BILATERAL LOW BACK PAIN WITHOUT SCIATICA: ICD-10-CM

## 2021-07-23 DIAGNOSIS — F11.90 CHRONIC, CONTINUOUS USE OF OPIOIDS: ICD-10-CM

## 2021-07-23 DIAGNOSIS — M54.50 CHRONIC BILATERAL LOW BACK PAIN WITHOUT SCIATICA: ICD-10-CM

## 2021-07-23 DIAGNOSIS — M17.0 PRIMARY OSTEOARTHRITIS OF BOTH KNEES: ICD-10-CM

## 2021-07-23 RX ORDER — OXYCODONE AND ACETAMINOPHEN 7.5; 325 MG/1; MG/1
1 TABLET ORAL EVERY 6 HOURS PRN
Qty: 120 TABLET | Refills: 0 | Status: SHIPPED
Start: 2021-07-23 | End: 2021-08-20 | Stop reason: SDUPTHER

## 2021-07-23 NOTE — TELEPHONE ENCOUNTER
----- Message from Lalit Salazar sent at 7/23/2021  9:44 AM EDT -----  Subject: Refill Request    QUESTIONS  Name of Medication? oxyCODONE-acetaminophen (PERCOCET) 7.5-325 MG per   tablet  Patient-reported dosage and instructions? 1 tablet by mouth every 6 hours  How many days do you have left? 2  Preferred Pharmacy? [address] phone number (if available)? [phone]  ---------------------------------------------------------------------------  --------------  CALL BACK INFO  What is the best way for the office to contact you? OK to leave message on   voicemail  Preferred Call Back Phone Number?  [phone]

## 2021-07-23 NOTE — TELEPHONE ENCOUNTER
Last Appointment:  3/1/2021  Future Appointments   Date Time Provider Mauri Sanford   8/16/2021  3:40 PM MD Jess Urias [facility]

## 2021-07-23 NOTE — TELEPHONE ENCOUNTER
----- Message from Key Bonny sent at 7/23/2021  9:48 AM EDT -----  Subject: Message to Provider    QUESTIONS  Information for Provider? Patient needs her prescription sent in and   filled today because she need it before Sunday. The lady that goes out to    and deliver the medication leaves before 12.   ---------------------------------------------------------------------------  --------------  CALL BACK INFO  What is the best way for the office to contact you? OK to leave message on   voicemail  Preferred Call Back Phone Number? [phone]  ---------------------------------------------------------------------------  --------------  SCRIPT ANSWERS  Relationship to Patient?  Self

## 2021-08-03 DIAGNOSIS — J44.9 COPD, MODERATE (HCC): ICD-10-CM

## 2021-08-03 RX ORDER — DOCUSATE SODIUM 100 MG/1
100 CAPSULE, LIQUID FILLED ORAL 2 TIMES DAILY
Qty: 60 CAPSULE | Refills: 3 | Status: SHIPPED
Start: 2021-08-03 | End: 2021-08-16 | Stop reason: SDUPTHER

## 2021-08-03 RX ORDER — PROMETHAZINE HYDROCHLORIDE AND CODEINE PHOSPHATE 6.25; 1 MG/5ML; MG/5ML
7.5 SYRUP ORAL 3 TIMES DAILY PRN
Qty: 473 ML | Refills: 1 | Status: SHIPPED
Start: 2021-08-03 | End: 2021-08-31

## 2021-08-16 ENCOUNTER — OFFICE VISIT (OUTPATIENT)
Dept: FAMILY MEDICINE CLINIC | Age: 66
End: 2021-08-16
Payer: COMMERCIAL

## 2021-08-16 VITALS
HEIGHT: 60 IN | TEMPERATURE: 97.4 F | OXYGEN SATURATION: 100 % | HEART RATE: 65 BPM | BODY MASS INDEX: 35.15 KG/M2 | SYSTOLIC BLOOD PRESSURE: 127 MMHG | DIASTOLIC BLOOD PRESSURE: 82 MMHG

## 2021-08-16 DIAGNOSIS — G89.4 CHRONIC PAIN SYNDROME: ICD-10-CM

## 2021-08-16 DIAGNOSIS — E78.2 MIXED HYPERLIPIDEMIA: ICD-10-CM

## 2021-08-16 DIAGNOSIS — D86.9 SARCOIDOSIS: ICD-10-CM

## 2021-08-16 DIAGNOSIS — J41.8 MIXED SIMPLE AND MUCOPURULENT CHRONIC BRONCHITIS (HCC): ICD-10-CM

## 2021-08-16 DIAGNOSIS — I10 BENIGN ESSENTIAL HYPERTENSION: ICD-10-CM

## 2021-08-16 DIAGNOSIS — E03.9 PRIMARY HYPOTHYROIDISM: ICD-10-CM

## 2021-08-16 DIAGNOSIS — I50.42 CHRONIC COMBINED SYSTOLIC AND DIASTOLIC HEART FAILURE (HCC): Primary | ICD-10-CM

## 2021-08-16 DIAGNOSIS — F33.41 RECURRENT MAJOR DEPRESSIVE DISORDER, IN PARTIAL REMISSION (HCC): ICD-10-CM

## 2021-08-16 DIAGNOSIS — E23.2 DIABETES INSIPIDUS, NEUROHYPOPHYSEAL (HCC): ICD-10-CM

## 2021-08-16 DIAGNOSIS — J96.11 CHRONIC RESPIRATORY FAILURE WITH HYPOXIA (HCC): Chronic | ICD-10-CM

## 2021-08-16 DIAGNOSIS — N18.30 STAGE 3 CHRONIC KIDNEY DISEASE, UNSPECIFIED WHETHER STAGE 3A OR 3B CKD (HCC): ICD-10-CM

## 2021-08-16 DIAGNOSIS — R87.619 ABNORMAL CERVICAL PAPANICOLAOU SMEAR, UNSPECIFIED ABNORMAL PAP FINDING: ICD-10-CM

## 2021-08-16 PROCEDURE — G8926 SPIRO NO PERF OR DOC: HCPCS | Performed by: FAMILY MEDICINE

## 2021-08-16 PROCEDURE — 99214 OFFICE O/P EST MOD 30 MIN: CPT | Performed by: FAMILY MEDICINE

## 2021-08-16 PROCEDURE — G8417 CALC BMI ABV UP PARAM F/U: HCPCS | Performed by: FAMILY MEDICINE

## 2021-08-16 PROCEDURE — 3023F SPIROM DOC REV: CPT | Performed by: FAMILY MEDICINE

## 2021-08-16 PROCEDURE — G8427 DOCREV CUR MEDS BY ELIG CLIN: HCPCS | Performed by: FAMILY MEDICINE

## 2021-08-16 PROCEDURE — 3017F COLORECTAL CA SCREEN DOC REV: CPT | Performed by: FAMILY MEDICINE

## 2021-08-16 PROCEDURE — 1036F TOBACCO NON-USER: CPT | Performed by: FAMILY MEDICINE

## 2021-08-16 PROCEDURE — 99212 OFFICE O/P EST SF 10 MIN: CPT | Performed by: FAMILY MEDICINE

## 2021-08-16 RX ORDER — PROMETHAZINE HYDROCHLORIDE 6.25 MG/5ML
6.25 SYRUP ORAL 4 TIMES DAILY
COMMUNITY
End: 2022-02-10

## 2021-08-16 RX ORDER — ALBUTEROL SULFATE 90 UG/1
2 AEROSOL, METERED RESPIRATORY (INHALATION) EVERY 6 HOURS PRN
Qty: 1 INHALER | Refills: 3 | Status: SHIPPED
Start: 2021-08-16 | End: 2021-11-04 | Stop reason: SDUPTHER

## 2021-08-16 RX ORDER — FERROUS SULFATE 325(65) MG
325 TABLET ORAL 2 TIMES DAILY
Qty: 60 TABLET | Refills: 5 | Status: SHIPPED
Start: 2021-08-16 | End: 2022-01-12 | Stop reason: SDUPTHER

## 2021-08-16 RX ORDER — METOPROLOL SUCCINATE AND HYDROCHLOROTHIAZIDE 100; 12.5 MG/1; MG/1
1 TABLET ORAL
Status: ON HOLD | COMMUNITY
Start: 2021-08-03 | End: 2021-12-14 | Stop reason: HOSPADM

## 2021-08-16 RX ORDER — FAMOTIDINE 20 MG/1
1 TABLET, FILM COATED ORAL
COMMUNITY
Start: 2021-08-03 | End: 2022-02-09 | Stop reason: SDUPTHER

## 2021-08-16 RX ORDER — DOCUSATE SODIUM 100 MG/1
100 CAPSULE, LIQUID FILLED ORAL 2 TIMES DAILY
Qty: 60 CAPSULE | Refills: 3 | Status: SHIPPED
Start: 2021-08-16 | End: 2021-11-04 | Stop reason: SDUPTHER

## 2021-08-16 NOTE — PROGRESS NOTES
[facility]  FAMILY MEDICINE RESIDENCY PROGRAM   OFFICE PROGRESS NOTE  DATE OF VISIT : 8/16/2021         Chief Complaint :   Chief Complaint   Patient presents with   [address]     on right eye, pt saw eye doctor and was given ointment for it       HPI:   London Villalta comes to clinic today for     F/U of chronic problem(s) and new or recent complaint of stye     Chronic problems reviewed today include:     Hypertension, Hyperlipidemia, Chronic kidney disease, Chronic pain, Hypothyroidism, Depression and diabetes insipidus, sarcoidosis    Current status of this/these condition(s):  stable    Tolerating meds: Yes    Additional history: Only new complaint is a stye in right eye, which is being treated by eye doctor. Has chronically dry eyes  Has blood in BM's (known to have hemorrhoids and rectal prolapse). Other chronic problems are stable. She has not been back for colposcopy with abnormal pap (previous appointment was cancelled)  Chronic pain is managed ok with narcotics. Denies problems except for occasional constipation.  complains that she has no energy or desire to do anything  No chest pain or new dyspnea (on chronic oxygen)    Objective:    VS:  Blood pressure 127/82, pulse 65, temperature 97.4 °F (36.3 °C), temperature source Temporal, height 5' (1.524 m), SpO2 100 %, not currently breastfeeding. General Appearance: Well developed, awake, alert, oriented, no acute distress. Does seem somewhat drowsy  HEENT: Normocephalic,atraumatic. PERRL, EOM's intact, EAC without erythema or swelling, no pallor or icterus. Chest wall/Lung: On nasal O2. Decreased BS with some right lower crackles  Heart[de-identified]  Regular rate and rhythm, S1and S2 normal, GII/VI CAROLYNE. Skin: Skin color, texture, turgor normal, no rashes or lesions  Musculoskeletal: wheelchair bound. ROM ok in right shoulder up to 90 degrees abduction.   Markedly limited in all directions in left shoulder  Neurologic: Alert, oriented. Normal gait and coordination   No focal neurologic deficits noted. Psychiatric: has a normal mood and affect. Behavior is normal.     I independently reviewed the following information:  historical medical records, lab reports and referral letter/letters    1. Chronic combined systolic and diastolic heart failure ([address]  2. Benign essential hypertension  3. Chronic respiratory failure with hypoxia (HCC)  4. Mixed simple and mucopurulent chronic bronchitis ([address]  5. Primary hypothyroidism  6. Diabetes insipidus, neurohypophyseal (HCC)  7. Stage 3 chronic kidney disease, unspecified whether stage 3a or 3b CKD ([address]  8. Mixed hyperlipidemia  9. Recurrent major depressive disorder, in partial remission ([address]  10. Sarcoidosis  11. Chronic pain syndrome  12. Abnormal cervical Papanicolaou smear, unspecified abnormal pap finding  -     Cecilia Taylor DO, OB/GYN, [facility]      Additional Plan:  I am going to see if I can get her into water therapy in attempt to provide some exercise. I will make referral to GYN to follow abnormal pap. We discussed cutting back further on her medications. Her next refill for phenergan with codeine should last 23 days per refill. I will see her in 2 months and begin a taper of her oxycodone as well    I called [facility]'s pharmacy to ask about a discrepancy found on her OARRS report. She apparently filled her cough syrup after only nine days. Apparently, she was provided with a different brand that didn't work and returned a partial bottle and paid for her next refill out of pocket because she didn't feel it was the same. Patient did agree with that report.   I will refill this after 20 days (by September 1, 2021), with the understanding that she will then need to make it last for 23 days at least.    On this date 8/16/2021 I have spent 37 minutes reviewing previous notes, test results and face to face with the patient discussing the diagnosis and importance of compliance with the treatment plan as well as documenting on the day of the visit. RTO in in 2 month(s) or sooner for any persistent, new, or worsening symptoms. Please see Patient Instructions for further counseling and information given. Advised to be adherent to the treatment plans discussed today, and please call with any questions or concerns, letting the office know of any reasons that the plans may not be followed. The risks of untreated conditions include worsening illness, injury, disability, and possibly, death. Please call if symptoms change in any way, worsen, or fail to completely resolve, as this could necessitate a change to treatment plans. Patient and/or caregiver expressed understanding. Indications and proper use of medication(s) reviewed. Potential side-effects and risks of medication(s) also explained. Patient and/or caregiver was instructed to call if any new symptoms develop prior to next visit. Health risk factors discussed and addressed.     Signed by : Krista Sharma MD, M.D.

## 2021-08-17 DIAGNOSIS — R53.81 DEBILITY: ICD-10-CM

## 2021-08-17 DIAGNOSIS — R26.9 GAIT DISTURBANCE: ICD-10-CM

## 2021-08-17 DIAGNOSIS — G89.4 CHRONIC PAIN SYNDROME: Primary | ICD-10-CM

## 2021-08-18 DIAGNOSIS — I10 ESSENTIAL HYPERTENSION: ICD-10-CM

## 2021-08-18 RX ORDER — AMLODIPINE BESYLATE 5 MG/1
5 TABLET ORAL DAILY
Qty: 30 TABLET | Refills: 5 | Status: SHIPPED
Start: 2021-08-18 | End: 2021-08-20 | Stop reason: SDUPTHER

## 2021-08-20 DIAGNOSIS — M54.50 CHRONIC BILATERAL LOW BACK PAIN WITHOUT SCIATICA: ICD-10-CM

## 2021-08-20 DIAGNOSIS — M17.0 PRIMARY OSTEOARTHRITIS OF BOTH KNEES: ICD-10-CM

## 2021-08-20 DIAGNOSIS — I10 ESSENTIAL HYPERTENSION: ICD-10-CM

## 2021-08-20 DIAGNOSIS — G89.29 CHRONIC BILATERAL LOW BACK PAIN WITHOUT SCIATICA: ICD-10-CM

## 2021-08-20 DIAGNOSIS — F11.90 CHRONIC, CONTINUOUS USE OF OPIOIDS: ICD-10-CM

## 2021-08-20 RX ORDER — OMEGA-3-ACID ETHYL ESTERS 1 G/1
1 CAPSULE, LIQUID FILLED ORAL 2 TIMES DAILY
Qty: 60 CAPSULE | Refills: 2 | Status: SHIPPED
Start: 2021-08-20 | End: 2021-11-04 | Stop reason: SDUPTHER

## 2021-08-20 RX ORDER — AMLODIPINE BESYLATE 5 MG/1
5 TABLET ORAL DAILY
Qty: 30 TABLET | Refills: 5 | Status: SHIPPED
Start: 2021-08-20 | End: 2022-04-07 | Stop reason: SDUPTHER

## 2021-08-20 RX ORDER — OXYCODONE AND ACETAMINOPHEN 7.5; 325 MG/1; MG/1
1 TABLET ORAL EVERY 6 HOURS PRN
Qty: 120 TABLET | Refills: 0 | Status: SHIPPED
Start: 2021-08-20 | End: 2021-09-17 | Stop reason: SDUPTHER

## 2021-08-30 DIAGNOSIS — J44.9 COPD, MODERATE (HCC): ICD-10-CM

## 2021-08-30 RX ORDER — PROMETHAZINE HYDROCHLORIDE AND CODEINE PHOSPHATE 6.25; 1 MG/5ML; MG/5ML
7.5 SYRUP ORAL 3 TIMES DAILY PRN
Qty: 473 ML | Refills: 1 | Status: CANCELLED | OUTPATIENT
Start: 2021-08-30 | End: 2021-11-28

## 2021-08-31 RX ORDER — PROMETHAZINE HYDROCHLORIDE AND CODEINE PHOSPHATE 6.25; 1 MG/5ML; MG/5ML
5 SYRUP ORAL 4 TIMES DAILY PRN
Qty: 473 ML | Refills: 1 | Status: SHIPPED
Start: 2021-08-31 | End: 2021-10-21 | Stop reason: SDUPTHER

## 2021-08-31 RX ORDER — ESCITALOPRAM OXALATE 10 MG/1
20 TABLET ORAL DAILY
Qty: 60 TABLET | Refills: 2 | Status: SHIPPED
Start: 2021-08-31 | End: 2021-11-04 | Stop reason: SDUPTHER

## 2021-09-09 RX ORDER — PREDNISONE 1 MG/1
5 TABLET ORAL DAILY
Qty: 30 TABLET | Refills: 5 | Status: SHIPPED
Start: 2021-09-09 | End: 2022-02-19 | Stop reason: SDUPTHER

## 2021-09-09 NOTE — TELEPHONE ENCOUNTER
Last Appointment:  8/16/2021  Future Appointments   Date Time Provider Mauri Sanford   10/6/2021  2:30 PM Misty Chandler  [facility]   10/25/2021  3:00 PM MD Hussein Hewitt [facility]

## 2021-09-17 DIAGNOSIS — M17.0 PRIMARY OSTEOARTHRITIS OF BOTH KNEES: ICD-10-CM

## 2021-09-17 DIAGNOSIS — M54.50 CHRONIC BILATERAL LOW BACK PAIN WITHOUT SCIATICA: ICD-10-CM

## 2021-09-17 DIAGNOSIS — G89.29 CHRONIC BILATERAL LOW BACK PAIN WITHOUT SCIATICA: ICD-10-CM

## 2021-09-17 DIAGNOSIS — F11.90 CHRONIC, CONTINUOUS USE OF OPIOIDS: ICD-10-CM

## 2021-09-17 RX ORDER — OXYCODONE AND ACETAMINOPHEN 7.5; 325 MG/1; MG/1
1 TABLET ORAL EVERY 6 HOURS PRN
Qty: 120 TABLET | Refills: 0 | Status: SHIPPED
Start: 2021-09-17 | End: 2021-10-15 | Stop reason: SDUPTHER

## 2021-10-06 ENCOUNTER — PROCEDURE VISIT (OUTPATIENT)
Dept: OBGYN | Age: 66
End: 2021-10-06
Payer: COMMERCIAL

## 2021-10-06 VITALS
HEIGHT: 60 IN | DIASTOLIC BLOOD PRESSURE: 73 MMHG | HEART RATE: 65 BPM | SYSTOLIC BLOOD PRESSURE: 120 MMHG | BODY MASS INDEX: 35.15 KG/M2

## 2021-10-06 DIAGNOSIS — N89.8 VAGINAL LESION: ICD-10-CM

## 2021-10-06 DIAGNOSIS — R87.810 ASCUS WITH POSITIVE HIGH RISK HPV CERVICAL: Primary | ICD-10-CM

## 2021-10-06 DIAGNOSIS — R87.610 ASCUS WITH POSITIVE HIGH RISK HPV CERVICAL: Primary | ICD-10-CM

## 2021-10-06 PROCEDURE — 57455 BIOPSY OF CERVIX W/SCOPE: CPT | Performed by: OBSTETRICS & GYNECOLOGY

## 2021-10-06 PROCEDURE — 99214 OFFICE O/P EST MOD 30 MIN: CPT | Performed by: OBSTETRICS & GYNECOLOGY

## 2021-10-06 NOTE — PROGRESS NOTES
Colposcopy Procedure Note    Indications: Pap smear showed: ASCUS, poitive high risk HPV 12/2020. Procedure Details   The risks and benefits of the procedure and informed consent obtained. Speculum placed in vagina and excellent visualization of cervix achieved, cervix swabbed copiously with acetic acid solution. Findings:  Cervix:  Tz visualized, no lesions. Vaginal inspection: 1cm black lesion inferior and to the right of the cervix. Specimens: Biopsy of vaginal lesions taken, silver nitrate for hemostasis. Complications: none. Plan:  Return to discuss Pathology results in 2 weeks by phone.     Ar Chahal MD

## 2021-10-06 NOTE — PROGRESS NOTES
Here today with  for colposcopy. Instructed on the procedure of colposcopy, voiced understanding and permit signed for colposcopy with possible biopsies. Prepared for procedure. Time out done by  Prior to starting the procedure. Tolerated procedure. No bleeding noted after procedure, kassandra pad applied. To return in one week for results. Discharge instructions reviewed verbally and written AVS given to patient. Voiced understanding and agreement.   Biopsy obtained, labeled and sent to lab

## 2021-10-15 DIAGNOSIS — M54.50 CHRONIC BILATERAL LOW BACK PAIN WITHOUT SCIATICA: ICD-10-CM

## 2021-10-15 DIAGNOSIS — G89.29 CHRONIC BILATERAL LOW BACK PAIN WITHOUT SCIATICA: ICD-10-CM

## 2021-10-15 DIAGNOSIS — M17.0 PRIMARY OSTEOARTHRITIS OF BOTH KNEES: ICD-10-CM

## 2021-10-15 DIAGNOSIS — F11.90 CHRONIC, CONTINUOUS USE OF OPIOIDS: ICD-10-CM

## 2021-10-15 RX ORDER — OXYCODONE AND ACETAMINOPHEN 7.5; 325 MG/1; MG/1
1 TABLET ORAL EVERY 6 HOURS PRN
Qty: 120 TABLET | Refills: 0 | Status: SHIPPED | OUTPATIENT
Start: 2021-10-15 | End: 2021-11-14

## 2021-10-21 ENCOUNTER — VIRTUAL VISIT (OUTPATIENT)
Dept: OBGYN | Age: 66
End: 2021-10-21
Payer: COMMERCIAL

## 2021-10-21 DIAGNOSIS — D07.2 VAIN III (VAGINAL INTRAEPITHELIAL NEOPLASIA III): Primary | ICD-10-CM

## 2021-10-21 DIAGNOSIS — J44.9 COPD, MODERATE (HCC): ICD-10-CM

## 2021-10-21 PROCEDURE — VIRTUALHLTH VIRTUAL HEALTH SAME DAY: Performed by: OBSTETRICS & GYNECOLOGY

## 2021-10-21 RX ORDER — PROMETHAZINE HYDROCHLORIDE AND CODEINE PHOSPHATE 6.25; 1 MG/5ML; MG/5ML
5 SYRUP ORAL 4 TIMES DAILY PRN
Qty: 473 ML | Refills: 1 | Status: SHIPPED
Start: 2021-10-21 | End: 2021-12-06 | Stop reason: SDUPTHER

## 2021-10-21 NOTE — PROGRESS NOTES
Jenn Fernandes is a 60 y.o. female evaluated via telephone on 10/21/2021. Consent:  She and/or health care decision maker is aware that that she may receive a bill for this telephone service, depending on her insurance coverage, and has provided verbal consent to proceed: Yes      Documentation:  Spoke with patient regarding biopsy of vaginal lesion noted on colposcopy. 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 Recommend evaluation by gyn/on. Referral placed. All questions were answered. I affirm this is a Patient Initiated Episode with a Patient who has not had a related appointment within my department in the past 7 days or scheduled within the next 24 hours. Patient identification was verified at the start of the visit: Yes    Total Time: minutes: <5 minutes (not billable)    The visit was conducted pursuant to the emergency declaration under the [facility], [address] [place] [address] authority and the [employer] Act. Patient identification was verified, and a caregiver was present when appropriate. The patient was located in a state where the provider was credentialed to provide care.     Note: not billable if this call serves to triage the patient into an appointment for the relevant concern      Antwon Pompa MD

## 2021-11-04 ENCOUNTER — OFFICE VISIT (OUTPATIENT)
Dept: FAMILY MEDICINE CLINIC | Age: 66
End: 2021-11-04
Payer: COMMERCIAL

## 2021-11-04 VITALS
HEART RATE: 62 BPM | DIASTOLIC BLOOD PRESSURE: 87 MMHG | WEIGHT: 183 LBS | RESPIRATION RATE: 18 BRPM | TEMPERATURE: 98.2 F | OXYGEN SATURATION: 97 % | HEIGHT: 60 IN | SYSTOLIC BLOOD PRESSURE: 137 MMHG | BODY MASS INDEX: 35.93 KG/M2

## 2021-11-04 DIAGNOSIS — J96.11 CHRONIC RESPIRATORY FAILURE WITH HYPOXIA (HCC): Primary | Chronic | ICD-10-CM

## 2021-11-04 DIAGNOSIS — D86.9 SARCOIDOSIS: ICD-10-CM

## 2021-11-04 DIAGNOSIS — J41.8 MIXED SIMPLE AND MUCOPURULENT CHRONIC BRONCHITIS (HCC): ICD-10-CM

## 2021-11-04 DIAGNOSIS — N18.30 STAGE 3 CHRONIC KIDNEY DISEASE, UNSPECIFIED WHETHER STAGE 3A OR 3B CKD (HCC): ICD-10-CM

## 2021-11-04 DIAGNOSIS — G89.29 CHRONIC BILATERAL LOW BACK PAIN WITHOUT SCIATICA: ICD-10-CM

## 2021-11-04 DIAGNOSIS — I48.0 PAF (PAROXYSMAL ATRIAL FIBRILLATION) (HCC): ICD-10-CM

## 2021-11-04 DIAGNOSIS — E78.2 MIXED HYPERLIPIDEMIA: ICD-10-CM

## 2021-11-04 DIAGNOSIS — E03.9 PRIMARY HYPOTHYROIDISM: ICD-10-CM

## 2021-11-04 DIAGNOSIS — F11.90 CHRONIC, CONTINUOUS USE OF OPIOIDS: Chronic | ICD-10-CM

## 2021-11-04 DIAGNOSIS — T38.0X5A STEROID-INDUCED AVASCULAR NECROSIS OF RIGHT SHOULDER (HCC): ICD-10-CM

## 2021-11-04 DIAGNOSIS — M54.50 CHRONIC BILATERAL LOW BACK PAIN WITHOUT SCIATICA: ICD-10-CM

## 2021-11-04 DIAGNOSIS — E23.2 DIABETES INSIPIDUS, NEUROHYPOPHYSEAL (HCC): ICD-10-CM

## 2021-11-04 DIAGNOSIS — I50.42 CHRONIC COMBINED SYSTOLIC AND DIASTOLIC HEART FAILURE (HCC): ICD-10-CM

## 2021-11-04 DIAGNOSIS — I10 BENIGN ESSENTIAL HYPERTENSION: ICD-10-CM

## 2021-11-04 DIAGNOSIS — M87.111 STEROID-INDUCED AVASCULAR NECROSIS OF RIGHT SHOULDER (HCC): ICD-10-CM

## 2021-11-04 LAB
CHOLESTEROL, TOTAL: 229 MG/DL (ref 0–199)
HDLC SERPL-MCNC: 88 MG/DL
LDL CHOLESTEROL CALCULATED: 127 MG/DL (ref 0–99)
TRIGL SERPL-MCNC: 70 MG/DL (ref 0–149)
VLDLC SERPL CALC-MCNC: 14 MG/DL

## 2021-11-04 PROCEDURE — 99212 OFFICE O/P EST SF 10 MIN: CPT | Performed by: FAMILY MEDICINE

## 2021-11-04 PROCEDURE — 90686 IIV4 VACC NO PRSV 0.5 ML IM: CPT

## 2021-11-04 PROCEDURE — G8417 CALC BMI ABV UP PARAM F/U: HCPCS | Performed by: FAMILY MEDICINE

## 2021-11-04 PROCEDURE — 99214 OFFICE O/P EST MOD 30 MIN: CPT | Performed by: FAMILY MEDICINE

## 2021-11-04 PROCEDURE — G0008 ADMIN INFLUENZA VIRUS VAC: HCPCS

## 2021-11-04 PROCEDURE — 6360000002 HC RX W HCPCS

## 2021-11-04 PROCEDURE — 3023F SPIROM DOC REV: CPT | Performed by: FAMILY MEDICINE

## 2021-11-04 PROCEDURE — G8926 SPIRO NO PERF OR DOC: HCPCS | Performed by: FAMILY MEDICINE

## 2021-11-04 PROCEDURE — 1036F TOBACCO NON-USER: CPT | Performed by: FAMILY MEDICINE

## 2021-11-04 PROCEDURE — G8482 FLU IMMUNIZE ORDER/ADMIN: HCPCS | Performed by: FAMILY MEDICINE

## 2021-11-04 PROCEDURE — G8427 DOCREV CUR MEDS BY ELIG CLIN: HCPCS | Performed by: FAMILY MEDICINE

## 2021-11-04 PROCEDURE — 3017F COLORECTAL CA SCREEN DOC REV: CPT | Performed by: FAMILY MEDICINE

## 2021-11-04 PROCEDURE — 36415 COLL VENOUS BLD VENIPUNCTURE: CPT | Performed by: FAMILY MEDICINE

## 2021-11-04 RX ORDER — OMEGA-3-ACID ETHYL ESTERS 1 G/1
1 CAPSULE, LIQUID FILLED ORAL 2 TIMES DAILY
Qty: 60 CAPSULE | Refills: 2 | Status: SHIPPED
Start: 2021-11-04 | End: 2022-04-27 | Stop reason: SDUPTHER

## 2021-11-04 RX ORDER — LEVOTHYROXINE SODIUM 0.07 MG/1
75 TABLET ORAL DAILY
Qty: 30 TABLET | Refills: 5 | Status: SHIPPED
Start: 2021-11-04 | End: 2022-06-10 | Stop reason: SDUPTHER

## 2021-11-04 RX ORDER — ESCITALOPRAM OXALATE 10 MG/1
20 TABLET ORAL DAILY
Qty: 60 TABLET | Refills: 2 | Status: SHIPPED
Start: 2021-11-04 | End: 2022-03-08 | Stop reason: SDUPTHER

## 2021-11-04 RX ORDER — ALBUTEROL SULFATE 90 UG/1
2 AEROSOL, METERED RESPIRATORY (INHALATION) EVERY 6 HOURS PRN
Qty: 1 EACH | Refills: 5 | Status: SHIPPED
Start: 2021-11-04 | End: 2022-02-19 | Stop reason: SDUPTHER

## 2021-11-04 RX ORDER — DOCUSATE SODIUM 100 MG/1
100 CAPSULE, LIQUID FILLED ORAL 2 TIMES DAILY
Qty: 60 CAPSULE | Refills: 3 | Status: SHIPPED
Start: 2021-11-04 | End: 2022-06-10 | Stop reason: SDUPTHER

## 2021-11-04 RX ORDER — FLUTICASONE FUROATE AND VILANTEROL TRIFENATATE 100; 25 UG/1; UG/1
1 POWDER RESPIRATORY (INHALATION) DAILY
Qty: 28 EACH | Refills: 5 | Status: SHIPPED
Start: 2021-11-04 | End: 2022-03-17 | Stop reason: SDUPTHER

## 2021-11-04 RX ORDER — ALBUTEROL SULFATE 2.5 MG/3ML
2.5 SOLUTION RESPIRATORY (INHALATION) EVERY 6 HOURS PRN
Qty: 120 EACH | Refills: 2 | Status: SHIPPED
Start: 2021-11-04 | End: 2022-02-19 | Stop reason: SDUPTHER

## 2021-11-04 SDOH — ECONOMIC STABILITY: FOOD INSECURITY: WITHIN THE PAST 12 MONTHS, THE FOOD YOU BOUGHT JUST DIDN'T LAST AND YOU DIDN'T HAVE MONEY TO GET MORE.: NEVER TRUE

## 2021-11-04 SDOH — ECONOMIC STABILITY: FOOD INSECURITY: WITHIN THE PAST 12 MONTHS, YOU WORRIED THAT YOUR FOOD WOULD RUN OUT BEFORE YOU GOT MONEY TO BUY MORE.: NEVER TRUE

## 2021-11-04 ASSESSMENT — SOCIAL DETERMINANTS OF HEALTH (SDOH): HOW HARD IS IT FOR YOU TO PAY FOR THE VERY BASICS LIKE FOOD, HOUSING, MEDICAL CARE, AND HEATING?: NOT HARD AT ALL

## 2021-11-04 NOTE — PROGRESS NOTES
[facility]  FAMILY MEDICINE RESIDENCY PROGRAM   OFFICE PROGRESS NOTE  DATE OF VISIT : 11/4/2021         Chief Complaint :   Chief Complaint   Patient presents with    Check-Up       HPI:   Saniya Das comes to clinic today for     F/U of chronic problem(s) and new or recent complaint of numbness of hands and feet     Chronic problems reviewed today include:     Hypertension, Hyperlipidemia, COPD, Chronic pain, Osteoarthritis and Hypothyroidism    Current status of this/these condition(s):  stable    Tolerating meds: Yes and denies adverse effects of narcotics    Additional history: Conditions are stable, but she continues to take codeine cough medicine regularly and tolerating Percocet 7.5 QID, but has not cut back. Afraid to cut back, but continues to have chronic pain. Describes mild numbness of both hands and feet. No relationship to position or time according to her. Objective:    Vitals: /87   Pulse 62   Temp 98.2 °F (36.8 °C) (Temporal)   Resp 18   Ht 5' (1.524 m)   Wt 183 lb (83 kg)   SpO2 97%   BMI 35.74 kg/m²   General Appearance: Well developed, awake, alert, oriented, and in NAD  Chest wall/Lung: Clear to auscultation bilaterally,  respirations unlabored. No rhonchi/wheezing/rales  Heart: Regular rate and rhythm, S1and S2 normal, no murmur, rub or gallop. Abdomen: Soft, nontender. Normal BS, no hepatosplenomegaly or mass  Extremities:  Marked limitation of motion of left shoulder. No edema. Hyperpigmented, firm nodule on inner aspect of right ankle under previous surgical scar. Mildly tender, but no erythema or drainage.  (previous metallic screw placement in this area)  Skin: Skin color, texture, turgor normal, no rashes or lesions  Neurologic:  strength 4/5, full ROM of hands.  + Tinel on left. Normal sensation by my exam  Psychiatric: has a normal mood and affect.  Behavior is normal.     I independently reviewed the following information:  historical medical records    1. Chronic respiratory failure with hypoxia ([facility])  2. Chronic, continuous use of opioids  -     Miscellaneous Sendout 1; Future  -     Doreen Martin MD, Pain Medicine, [place]  3. Chronic bilateral low back pain without sciatica  -     Doreen Martin MD, Pain Medicine, [place]  4. Primary hypothyroidism  5. Diabetes insipidus, neurohypophyseal ([facility]  6. Sarcoidosis  -     albuterol (PROVENTIL) (2.5 MG/3ML) 0.083% nebulizer solution; Take 3 mLs by nebulization every 6 hours as needed for Wheezing or Shortness of Breath, Disp-120 each, R-2Normal  7. Steroid-induced avascular necrosis of right shoulder (HCC)  -     Doreen Martin MD, Pain Medicine, [place] [id number]  8. Stage 3 chronic kidney disease, unspecified whether stage 3a or 3b CKD ([facility]  9. PAF (paroxysmal atrial fibrillation) ([facility]) currently in NSR  10. Mixed hyperlipidemia  -     Lipid Panel; Future  11. Chronic combined systolic and diastolic heart failure ([facility]  12. Benign essential hypertension  13. COPD (chronic obstructive pulmonary disease) ([facility])  -     albuterol (PROVENTIL) (2.5 MG/3ML) 0.083% nebulizer solution; Take 3 mLs by nebulization every 6 hours as needed for Wheezing or Shortness of Breath, Disp-120 each, R-2Normal      Additional Plan:    I refilled a number of medications for her and she did get her flu shot today. I think that she has mild carpal tunnel symptoms, but that wouldn't explain the numbness of her toes. She has no objective evidence of neuropathy and no history of DM or other conditions likely to cause metabolic neuropathy. It is possible that her numbness may be related to her DDD of lumbar spine, but symptoms are mild at this time. I recommend using wrist splints and observation for worsening. We had a long discussion regarding her chronic pain and use of narcotics.   I have been trying to very gradually wean her, but told her that I believe that she would be best served by seeing pain management or even addiction medicine for continued weaning. She thinks that she has had increased pain when she has tried to cut back in past.  I informed her that I would be retiring from practice and that I would make a referral to pain management at this time. Meanwhile, I am going to decrease her Percocet to 5 mg (max 5/day) with her next prescription. I have asked that she try and use only 4/day if possible. On this date 11/4/2021 I have spent 36 minutes reviewing previous notes, test results and face to face with the patient discussing the diagnosis and importance of compliance with the treatment plan as well as documenting on the day of the visit. RTO in in 3 month(s) or sooner for any persistent, new, or worsening symptoms. Please see Patient Instructions for further counseling and information given. Advised to be adherent to the treatment plans discussed today, and please call with any questions or concerns, letting the office know of any reasons that the plans may not be followed. The risks of untreated conditions include worsening illness, injury, disability, and possibly, death. Please call if symptoms change in any way, worsen, or fail to completely resolve, as this could necessitate a change to treatment plans. Patient and/or caregiver expressed understanding. Indications and proper use of medication(s) reviewed. Potential side-effects and risks of medication(s) also explained. Patient and/or caregiver was instructed to call if any new symptoms develop prior to next visit. Health risk factors discussed and addressed.     Signed by : Janice Terrazas MD, M.SARIKA.

## 2021-11-04 NOTE — PROGRESS NOTES
Vaccine Information Sheet, \"Influenza - Inactivated\"  given to Myrna Covarrubias, or parent/legal guardian of  Myrna Covarrubias and verbalized understanding. Patient responses:    Have you ever had a reaction to a flu vaccine? No  Do you have any current illness? No  Have you ever had [place] Syndrome? No  Do you have a serious allergy to any of the follow: Neomycin, Polymyxin, Thimerosal, eggs or egg products? No    Flu vaccine given per order. Please see immunization tab. Risks and benefits explained. Current VIS given.

## 2021-11-05 LAB
6-MONOACETYLMORPHINE, URINE: NOT DETECTED
ALCOHOL URINE: NOT DETECTED
AMPHETAMINE SCREEN, URINE: NOT DETECTED
BARBITURATE SCREEN URINE: NOT DETECTED
BENZODIAZEPINE SCREEN, URINE: NOT DETECTED
BUPRENORPHINE URINE: NOT DETECTED
CANNABINOID SCREEN URINE: NOT DETECTED
COCAINE METABOLITE SCREEN URINE: NOT DETECTED
COMMENT: NORMAL
FENTANYL SCREEN, URINE: NOT DETECTED
INTEGRITY CHECK, CREATININE, URINE: 184.2
INTEGRITY CHECK, OXIDANT, URINE: <40
INTEGRITY CHECK, PH, URINE: 5.4 (ref 4.5–9)
INTEGRITY CHECK, SPECIFIC GRAVITY, URINE: 1.02 (ref 1–1.03)
INTEGRITY CHECK, SPECIMEN INTEGRITY, URINE: ABNORMAL
Lab: ABNORMAL
METHADONE SCREEN, URINE: NOT DETECTED
NOROXYCODONE, QUANTITATIVE, URINE: >1000
OPIATE SCREEN URINE: NOT DETECTED
OXYCODONE URINE, QUANTITATIVE: >1000
OXYCODONE URINE: POSITIVE
OXYMORPHONE, QUANTITATIVE, URINE: 488.2
PHENCYCLIDINE SCREEN URINE: NOT DETECTED
TRAMADOL SCREEN URINE: NOT DETECTED

## 2021-11-15 DIAGNOSIS — G89.29 CHRONIC BILATERAL LOW BACK PAIN WITHOUT SCIATICA: ICD-10-CM

## 2021-11-15 DIAGNOSIS — M54.50 CHRONIC BILATERAL LOW BACK PAIN WITHOUT SCIATICA: ICD-10-CM

## 2021-11-15 DIAGNOSIS — M17.0 PRIMARY OSTEOARTHRITIS OF BOTH KNEES: ICD-10-CM

## 2021-11-15 DIAGNOSIS — F11.90 CHRONIC, CONTINUOUS USE OF OPIOIDS: ICD-10-CM

## 2021-11-15 RX ORDER — OXYCODONE HYDROCHLORIDE AND ACETAMINOPHEN 5; 325 MG/1; MG/1
1 TABLET ORAL EVERY 4 HOURS PRN
Qty: 150 TABLET | Refills: 0 | Status: SHIPPED
Start: 2021-11-15 | End: 2021-12-15 | Stop reason: SDUPTHER

## 2021-11-15 RX ORDER — DESMOPRESSIN ACETATE 0.1 MG/1
0.2 TABLET ORAL 2 TIMES DAILY
Qty: 120 TABLET | Refills: 0 | Status: SHIPPED
Start: 2021-11-15 | End: 2021-12-15 | Stop reason: SDUPTHER

## 2021-11-15 RX ORDER — OXYCODONE AND ACETAMINOPHEN 7.5; 325 MG/1; MG/1
1 TABLET ORAL EVERY 6 HOURS PRN
Qty: 120 TABLET | Refills: 0 | Status: CANCELLED | OUTPATIENT
Start: 2021-11-15 | End: 2021-12-15

## 2021-12-06 DIAGNOSIS — J44.9 COPD, MODERATE (HCC): ICD-10-CM

## 2021-12-06 RX ORDER — PROMETHAZINE HYDROCHLORIDE AND CODEINE PHOSPHATE 6.25; 1 MG/5ML; MG/5ML
5 SYRUP ORAL 4 TIMES DAILY PRN
Qty: 473 ML | Refills: 1 | Status: SHIPPED
Start: 2021-12-06 | End: 2022-01-20 | Stop reason: SDUPTHER

## 2021-12-06 NOTE — TELEPHONE ENCOUNTER
Last Appointment:  11/4/2021  Future Appointments   Date Time Provider Mauri Sanford   12/7/2021 10:00 AM Didier Paz MD [facility] PAT None   12/14/2021 11:00 AM Didier Paz MD [facility] GEN BIN None   12/27/2021  1:30 PM Didier Paz MD [facility]   1/12/2022  3:00 PM Ruddy Zamorano MD BD PAIN [facility]

## 2021-12-15 DIAGNOSIS — F11.90 CHRONIC, CONTINUOUS USE OF OPIOIDS: ICD-10-CM

## 2021-12-15 DIAGNOSIS — G89.29 CHRONIC BILATERAL LOW BACK PAIN WITHOUT SCIATICA: ICD-10-CM

## 2021-12-15 DIAGNOSIS — M54.50 CHRONIC BILATERAL LOW BACK PAIN WITHOUT SCIATICA: ICD-10-CM

## 2021-12-15 RX ORDER — OXYCODONE HYDROCHLORIDE AND ACETAMINOPHEN 5; 325 MG/1; MG/1
1 TABLET ORAL EVERY 4 HOURS PRN
Qty: 150 TABLET | Refills: 0 | Status: SHIPPED
Start: 2021-12-15 | End: 2021-12-16 | Stop reason: SDUPTHER

## 2021-12-15 RX ORDER — DESMOPRESSIN ACETATE 0.1 MG/1
0.2 TABLET ORAL 2 TIMES DAILY
Qty: 120 TABLET | Refills: 0 | Status: SHIPPED
Start: 2021-12-15 | End: 2022-01-12 | Stop reason: SDUPTHER

## 2021-12-15 NOTE — TELEPHONE ENCOUNTER
Last Appointment:  11/4/2021  Future Appointments   Date Time Provider Mauri Nicolei   12/27/2021  1:30 PM MD [facility]   1/12/2022  3:00 PM Macario Pressley MD BDM PAIN [facility]

## 2021-12-16 DIAGNOSIS — G89.29 CHRONIC BILATERAL LOW BACK PAIN WITHOUT SCIATICA: ICD-10-CM

## 2021-12-16 DIAGNOSIS — M54.50 CHRONIC BILATERAL LOW BACK PAIN WITHOUT SCIATICA: ICD-10-CM

## 2021-12-16 DIAGNOSIS — G89.18 POST-OP PAIN: ICD-10-CM

## 2021-12-16 DIAGNOSIS — F11.90 CHRONIC, CONTINUOUS USE OF OPIOIDS: ICD-10-CM

## 2021-12-16 RX ORDER — OXYCODONE HYDROCHLORIDE AND ACETAMINOPHEN 5; 325 MG/1; MG/1
1 TABLET ORAL EVERY 4 HOURS PRN
Qty: 150 TABLET | Refills: 0 | Status: SHIPPED
Start: 2021-12-16 | End: 2022-01-12 | Stop reason: SDUPTHER

## 2021-12-16 RX ORDER — OXYCODONE HYDROCHLORIDE AND ACETAMINOPHEN 5; 325 MG/1; MG/1
1 TABLET ORAL EVERY 4 HOURS PRN
Qty: 150 TABLET | Refills: 0 | OUTPATIENT
Start: 2021-12-16 | End: 2022-01-15

## 2021-12-16 NOTE — TELEPHONE ENCOUNTER
Dr. Eben Bhatia was sent to the wrong pharmacy. Needs to go to [facility] drug. I call [facility] and cancelled  The script. They did transfer the DDavp to [facility].

## 2021-12-31 NOTE — H&P
Patient's office history and physical was reviewed. Patient examined. There has been no change in the patient's history and physical.      Physician Signature: Electronically signed by Dr. Bharti Banks P    Patient's Name/Date of Birth: Dori Reza / 1955    Date: 3/9/2021    PCP: Griffin Mccarty MD    Referring Physician:   Cassidy Russo MD  [phone]    No chief complaint on file. HPI:    The patient said she continues to have issues with her hemorrhoids. She said they bleed and she has to push them back in. They are doing ok today. Patient's medications, allergies, past medical, surgical, social and family histories were reviewed and updated as appropriate. Review of Systems  Constitutional: negative  Respiratory: negative  Cardiovascular: negative  Gastrointestinal: as in hpoi  Genitourinary:negative  Integument/breast: as in hpi    Physical Exam:  BP (!) 164/80   Pulse 72   Temp 97.2 °F (36.2 °C)   Resp 16   Ht 5' (1.524 m)   Wt 193 lb (87.5 kg)   SpO2 98%   BMI 37.69 kg/m²   General appearance: alert, cooperative and in no acute distress. Lungs: normal work of breathing  Heart: regular rate  Abdomen: soft, nontender, nondistended  Musculoskeletal: symmetrical without edema. Skin: small external hemorrhoids, no prolapsed hemorrhoids        Impression/Plan:  60y.o. year old female with grade 2/3 hemorrhoids, s/p open hemorrhoidectomy, anticoagulated on Eliquis    Sigmoidoscopy and Hemorrhoid banding  I explained the risks, benefits, alternatives, and potential complications associated with the above procedure to be performed and transfusions when applicable with the patient/responsible person prior to the procedure. All of the patient's questions were answered. The patient understands and agrees to surgery.          Electronically by Clair Vásquez MD, General Surgery  on 3/19/2021 at 10:09 AM      Send copy of H&P to PCP, Griffin Mccarty MD and referring physician, Luca Haynes MD      3/9/2021 No

## 2022-01-12 ENCOUNTER — OFFICE VISIT (OUTPATIENT)
Dept: PAIN MANAGEMENT | Age: 67
End: 2022-01-12
Payer: COMMERCIAL

## 2022-01-12 VITALS
BODY MASS INDEX: 35.73 KG/M2 | TEMPERATURE: 96.5 F | OXYGEN SATURATION: 100 % | HEIGHT: 60 IN | WEIGHT: 182 LBS | RESPIRATION RATE: 18 BRPM | DIASTOLIC BLOOD PRESSURE: 80 MMHG | HEART RATE: 68 BPM | SYSTOLIC BLOOD PRESSURE: 128 MMHG

## 2022-01-12 DIAGNOSIS — M19.90 OSTEOARTHRITIS, UNSPECIFIED OSTEOARTHRITIS TYPE, UNSPECIFIED SITE: ICD-10-CM

## 2022-01-12 DIAGNOSIS — F11.90 CHRONIC, CONTINUOUS USE OF OPIOIDS: ICD-10-CM

## 2022-01-12 DIAGNOSIS — M54.50 CHRONIC BILATERAL LOW BACK PAIN WITHOUT SCIATICA: ICD-10-CM

## 2022-01-12 DIAGNOSIS — R52 DIFFUSE PAIN: Primary | ICD-10-CM

## 2022-01-12 DIAGNOSIS — G89.29 CHRONIC BILATERAL LOW BACK PAIN WITHOUT SCIATICA: ICD-10-CM

## 2022-01-12 PROCEDURE — 1123F ACP DISCUSS/DSCN MKR DOCD: CPT | Performed by: ANESTHESIOLOGY

## 2022-01-12 PROCEDURE — G8427 DOCREV CUR MEDS BY ELIG CLIN: HCPCS | Performed by: ANESTHESIOLOGY

## 2022-01-12 PROCEDURE — 1036F TOBACCO NON-USER: CPT | Performed by: ANESTHESIOLOGY

## 2022-01-12 PROCEDURE — 4040F PNEUMOC VAC/ADMIN/RCVD: CPT | Performed by: ANESTHESIOLOGY

## 2022-01-12 PROCEDURE — 99213 OFFICE O/P EST LOW 20 MIN: CPT | Performed by: ANESTHESIOLOGY

## 2022-01-12 PROCEDURE — G8399 PT W/DXA RESULTS DOCUMENT: HCPCS | Performed by: ANESTHESIOLOGY

## 2022-01-12 PROCEDURE — 3017F COLORECTAL CA SCREEN DOC REV: CPT | Performed by: ANESTHESIOLOGY

## 2022-01-12 PROCEDURE — 1090F PRES/ABSN URINE INCON ASSESS: CPT | Performed by: ANESTHESIOLOGY

## 2022-01-12 PROCEDURE — G8482 FLU IMMUNIZE ORDER/ADMIN: HCPCS | Performed by: ANESTHESIOLOGY

## 2022-01-12 PROCEDURE — G8417 CALC BMI ABV UP PARAM F/U: HCPCS | Performed by: ANESTHESIOLOGY

## 2022-01-12 RX ORDER — DESMOPRESSIN ACETATE 0.1 MG/1
0.2 TABLET ORAL 2 TIMES DAILY
Qty: 120 TABLET | Refills: 0 | Status: SHIPPED
Start: 2022-01-12 | End: 2022-03-17 | Stop reason: SDUPTHER

## 2022-01-12 RX ORDER — FERROUS SULFATE 325(65) MG
325 TABLET ORAL 2 TIMES DAILY
Qty: 60 TABLET | Refills: 5 | Status: SHIPPED
Start: 2022-01-12 | End: 2022-06-10 | Stop reason: SDUPTHER

## 2022-01-12 RX ORDER — OXYCODONE HYDROCHLORIDE AND ACETAMINOPHEN 5; 325 MG/1; MG/1
1 TABLET ORAL EVERY 4 HOURS PRN
Qty: 150 TABLET | Refills: 0 | Status: SHIPPED
Start: 2022-01-12 | End: 2022-02-10 | Stop reason: SDUPTHER

## 2022-01-12 NOTE — PROGRESS NOTES
[facility]      [place], [address] [email or web address]  Dept: [phone]      Follow up Note      Janina Vee     Date of Visit:  1/12/2022    CC:  Patient presents for follow up   Chief Complaint   Patient presents with    Follow-up     HPI:  Diffuse whole body pain. Has h/o sarcoidosis, OA, chronic steroid use and multiple other co morbidities.     Has been on chronic opioids for years by her PCP- apparently was on high dose Oxycontin at some point of time and has been reduced to current dose of Percocet 5/325 po Q 4-6 hrs.     Pain is unchanged. Change in quality of symptoms:no. Nursing notes and details of the pain history reviewed. Please see intake notes for details.      Previous treatments:   Physical Therapy : yes      Medications: - NSAID's : yes - h/o CKD noted                       - Membrane stabilizers : no                       - Opioids : yes, has bene on chronic opioids for over 10 years                       - Adjuvants or Others : yes,      She has been on anticoagulation medications yes and include Eliquis.     She has not been on herbal supplements.       She is not diabetic.     H/O Smoking: no  H/O alcohol abuse : denies   H/O Illicit drug use : no     Employment: disability     Imaging:   X-ray Right Hip:9/2019  Impression   No acute fracture or dislocation. Mild osteoarthritis at the right   hip.      X ray of b/l hip and pelvis: 2019  Impression   Limited study, degenerative changes      X ray left knee: 5/2018: Impression   ALERT:  THIS IS AN ABNORMAL REPORT   1. Limited evaluation secondary to improper technique in orientation. Recommend repeat study with proper orientation at level with the joint   spaces. 2. Findings still remain concerning for moderate to moderately severe   medial and lateral and mild to moderate tricompartmental   osteoarthritis.    3. A moderate to large joint effusion may be present although this   evaluation is also limited by the improper orientation and technique. 4. Extensive callus formation traversing an age-indeterminate fracture   proximal fibula with persistent fracture line lucency          X ray of the right knee: 2/2018:  FINDINGS: Healed fracture deformity of the proximal right fibular   diaphysis similar to previously study. Healed fracture deformity of   the proximal right tibial metadiaphysis associated with presence of a   single horizontal metallic screw similar to previously study.       Moderate femorotibial joint space narrowing medially. Moderate size   osteophytes arising from femoral condyles, tibial plateaus on dorsal   aspect of the patella are present. There is a moderate size to large   size joint effusion. Subtle lucencies of the visualized right tibial   mid diaphysis related to previously removed screw tract. Soft tissues   are unremarkable.           Impression       1. No significant interval change since prior study August 17, 2016. Urine Drug Screening:reviewed. OARRS report[de-identified] reviewed.     Past Medical History:   Diagnosis Date    A-fib [facility])     follows with Dr Steven Noguera to transfer from chair to wheelchair     with assistance    Abnormal mammogram 2010    Acute on chronic respiratory failure ([address] 05/05/2017    Anemia     Anemia due to chronic illness     Ankle fracture, left 2008    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[address] 03/26/2007    Backache     Benign hypertension     CHF (congestive heart failure) (HCC)     Chronic back pain     Chronic kidney disease     stage 3    Chronic pain disorder     Chronic, continuous use of opioids     Compression fracture of thoracic vertebra (HCC)     T10    COPD (chronic obstructive pulmonary disease) ([address]     Debility     Deformity of ankle and foot, acquired 01/31/2011    Depression     Diabetes insipidus ([address]     Diverticulitis     Dry eyes     Encephalopathy acute 05/05/2017    Gait disturbance     GERD (gastroesophageal reflux disease)     Hemorrhoids 01/13/2012    Hernia     History of blood transfusion approx 05/2017    History of Clostridium difficile     negative culture 12-15-15; resolved    Hyperlipidemia     Hypothyroidism     Ischemic colitis, enteritis, or enterocolitis ([address]     Long-term current use of steroids     Lumbar disc herniation     Myalgia and myositis, unspecified     Nephrosclerosis     Nonunion of fracture 02/22/2011    Osteoarthritis     severe    PAF (paroxysmal atrial fibrillation) (HCC)     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HCC)     PCWP mean:26.0 PA mean: 24.00; denies any recent issues    Pulmonary hypertension (HCC)     ventricular diastolic function consistent with abnormal relaxation (stage I)    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, f/u w/ PCP    Rectal bleeding     Rhabdomyolysis 05/09/2011    Steroid-induced avascular necrosis of shoulder ([facility]     Right    Tibia fracture 07/2010       Past Surgical History:   Procedure Laterality Date    ABDOMEN SURGERY  2008    ischemic colitis    COLONOSCOPY  2008    healed colitis    COLONOSCOPY  04/11/2014    COLONOSCOPY  11/23/2015    severe colitis    COLONOSCOPY  10/24/2016    COLONOSCOPY  07/26/2017    ECHO COMPL W DOP COLOR FLOW  8/16/2015         ECHO COMPLETE  4/22/2013         FRACTURE SURGERY  2010    Tibial right    HEMORRHOID SURGERY  12/08/2016    KYPHOSIS SURGERY      For comp.  fx T10    LUMBAR DISC SURGERY      OTHER SURGICAL HISTORY  11/1/11    removal r leg external fixator    OTHER SURGICAL HISTORY  12/14/2021    Partial Vaginectomy, Endometrial Biopsy    SIGMOIDOSCOPY N/A 3/19/2021    SIGMOIDOSCOPY HEMORRHOID BANDING performed by Stefanie Frye MD at [facility]      application TSF  9/5/18    UPPER GASTROINTESTINAL ENDOSCOPY  1/4/2016    UPPER GASTROINTESTINAL ENDOSCOPY  07/26/2017       Prior to Admission medications    Medication Sig Start Date End Date Taking? Authorizing Provider   oxyCODONE-acetaminophen (PERCOCET) 5-325 MG per tablet Take 1 tablet by mouth every 4 hours as needed for Pain (max: 150/month) for up to 30 days. 12/16/21 1/15/22 Yes Geremias Platt MD   desmopressin (DDAVP) 0.1 MG tablet Take 2 tablets by mouth 2 times daily 12/15/21 1/14/22 Yes Geremias Platt MD   naloxone 4 MG/0.1ML LIQD nasal spray 1 spray by Nasal route as needed for Opioid Reversal 12/14/21  Yes Belia Murphy MD   promethazine-codeine (PHENERGAN WITH CODEINE) 6.25-10 MG/5ML syrup Take 5 mLs by mouth 4 times daily as needed for Cough for up to 60 days.  12/6/21 2/4/22 Yes Geremias Platt MD   escitalopram (LEXAPRO) 10 MG tablet Take 2 tablets by mouth daily  Patient taking differently: Take 10 mg by mouth 2 times daily  11/4/21  Yes Geremias Platt MD   docusate sodium (COLACE) 100 MG capsule Take 1 capsule by mouth 2 times daily 11/4/21  Yes Geremias Platt MD   albuterol sulfate HFA (PROVENTIL HFA) 108 (90 Base) MCG/ACT inhaler Inhale 2 puffs into the lungs every 6 hours as needed for Wheezing or Shortness of Breath 11/4/21  Yes Geremias Platt MD   apixaban (ELIQUIS) 5 MG TABS tablet Take 1 tablet by mouth 2 times daily 11/4/21  Yes Geremias Platt MD   BREO ELLIPTA 100-25 MCG/INH AEPB inhaler Inhale 1 puff into the lungs daily 11/4/21  Yes Geremias Platt MD   mupirocin [facility]) 2 % ointment Apply topically daily 11/4/21  Yes Geremias Platt MD   levothyroxine (SYNTHROID) 75 MCG tablet Take 1 tablet by mouth daily 11/4/21  Yes Geremias Platt MD   albuterol (PROVENTIL) (2.5 MG/3ML) 0.083% nebulizer solution Take 3 mLs by nebulization every 6 hours as needed for Wheezing or Shortness of Breath 11/4/21  Yes Geremias Platt MD   predniSONE (DELTASONE) 5 MG tablet Take 1 tablet by mouth daily 9/9/21  Yes Geremias Platt MD   amLODIPine (NORVASC) 5 MG tablet Take 1 tablet by mouth daily  Patient taking differently: Take 5 mg by mouth nightly  8/20/21  Yes Speedy Garrison MD   ferrous sulfate (IRON 325) 325 (65 Fe) MG tablet Take 1 tablet by mouth 2 times daily 8/16/21  Yes Speedy Garrison MD   famotidine (PEPCID) 20 MG tablet Take 1 tablet by mouth 8/3/21  Yes Historical Provider, MD   promethazine (PHENERGAN) 6.25 MG/5ML syrup Take 6.25 mg by mouth 4 times daily   Yes Historical Provider, MD   metoprolol succinate (TOPROL XL) 100 MG extended release tablet Take 1 tablet by mouth daily 6/21/21  Yes Speedy Garrison MD   metoprolol succinate (TOPROL XL) 50 MG extended release tablet TAKE ONE TABLET EVERY DAY WITH THE 100MG 6/21/21  Yes Speedy Garrison MD   Respiratory Therapy Supplies (NEBULIZER/TUBING/MOUTHPIECE) KIT 1 kit by Does not apply route daily as needed (wheezing) 4/2/21  Yes Speedy Garrison MD   Omega 3 1000 MG CAPS Take 1 each by mouth daily 12/3/20  Yes Speedy Garrison MD   cycloSPORINE (RESTASIS) 0.05 % ophthalmic emulsion Place 1 drop into both eyes every 12 hours 11/19/20  Yes Speedy Garrison MD   hydrocortisone (ANUSOL-HC) 2.5 % CREA rectal cream Apply to affected area BID 10/13/20  Yes Speedy Garrison MD   sodium chloride (ALTAMIST SPRAY) 0.65 % nasal spray 1 spray by Nasal route as needed for Congestion 8/6/20  Yes Desirae Shah,    OXYGEN 4 L/min by Nasal route as needed.  BMS   Yes Historical Provider, MD   omega-3 acid ethyl esters (LOVAZA) 1 g capsule Take 1 capsule by mouth 2 times daily  Patient not taking: Reported on 1/12/2022 11/4/21   Speedy Garrison MD   mupirocin Devota Brittney) 2 % ointment Apply topically daily 6/8/20   Speedy Garrison MD       No Known Allergies    Social History     Socioeconomic History    Marital status:      Spouse name: Not on file    Number of children: Not on file    Years of education: Not on file    Highest education level: Not on file   Occupational History    Occupation: disability -[employer]   Tobacco Use    Smoking status: Former Smoker     Packs/day: 0.75     Years: 25.00     Pack years: 18.75     Types: Cigarettes     Start date:      Quit date: 9/10/1996     Years since quittin.3    Smokeless tobacco: Never Used   Vaping Use    Vaping Use: Never used   Substance and Sexual Activity    Alcohol use: Never     Alcohol/week: 0.0 standard drinks    Drug use: Yes     Comment:  coccaine    Sexual activity: Not Currently   Other Topics Concern    Not on file   Social History Narrative    1 child, 2 step children,  for 20 years. Social Determinants of Health     Financial Resource Strain: Low Risk     Difficulty of Paying Living Expenses: Not hard at all   Food Insecurity: No Food Insecurity    Worried About Running Out of Food in the Last Year: Never true    Dorota of Food in the Last Year: Never true   Transportation Needs:     Lack of Transportation (Medical): Not on file    Lack of Transportation (Non-Medical):  Not on file   Physical Activity:     Days of Exercise per Week: Not on file    Minutes of Exercise per Session: Not on file   Stress:     Feeling of Stress : Not on file   Social Connections:     Frequency of Communication with Friends and Family: Not on file    Frequency of Social Gatherings with Friends and Family: Not on file    Attends Shinto Services: Not on file    Active Member of [address] [place] [address] [employer] or Organizations: Not on file    Attends Club or Organization Meetings: Not on file    Marital Status: Not on file   Intimate Partner Violence:     Fear of Current or Ex-Partner: Not on file    Emotionally Abused: Not on file    Physically Abused: Not on file    Sexually Abused: Not on file   Housing Stability:     Unable to Pay for Housing in the Last Year: Not on file    Number of [place] in the Last Year: Not on file    Unstable Housing in the Last Year: Not on file       Family History   Problem Relation Age of Onset    Diabetes Mother     Arthritis Mother     High Blood Pressure Mother     Arthritis Father     High Blood Pressure Father     Diabetes Father     High Blood Pressure Sister     Alcohol Abuse Sister     Substance Abuse Brother     Substance Abuse Sister     Coronary Art Dis Neg Hx     Breast Cancer Neg Hx     Ovarian Cancer Neg Hx        REVIEW OF SYSTEMS:     Antony Avina denies fever/chills, chest pain, shortness of breath, new bowel or bladder complaints. All other review of systems was negative. PHYSICAL EXAMINATION:      /80   Pulse 68   Temp 96.5 °F (35.8 °C) (Infrared)   Resp 18   Ht 5' (1.524 m)   Wt 182 lb (82.6 kg)   SpO2 100% Comment: 4 L Oxygen  BMI 35.54 kg/m²     General:       General appearance:  Pleasant and well-hydrated, in no distress and A & O x 3  Build:Obese  Function: Rises from a seated position with difficulty and Unable to rise from a seated position without assistance     HEENT:     Head:normocephalic, atraumatic     Lungs:     Breathing:normal breathing pattern      CVS:     RRR     Abdomen:     Shape:obese, non-distended and normal     Cervical spine:     Palpation:tenderness paravertebral muscles, tenderness trapezium, left, right and positive. Range of motion:reduced flexion, extension, rotation bilaterally and is not painful. Spurling's: negative bilaterally     Thoracic spine:                Diffuse tenderness over the B/l paraspinal area +     Lumbar spine:     Palpation: Tenderness paravertebral muscles Yes bilaterally  Range of motion: Decreased  Sacroiliac joint tenderness No bilaterally  SLR : negative bilaterally  CVA tenderness:No       Musculoskeletal:     Trigger points+     Extremities:     Tenderness over the knee joint lines +, deformity noted     Hip ROM pain +      Neurological:     Sensory: Normal to light touch      Motor:   No new  focal deficits.     Gait: Uses a wheel chair     Dermatology:     Skin:no rashes or lesions noted     Assessment/Plan:       Diagnosis Orders   1. Chronic pain syndrome      2. Diffuse pain      3.  Osteoarthritis, unspecified osteoarthritis type, unspecified site  Tens unit   4. Osteoarthritis of knee, unspecified laterality, unspecified osteoarthritis type  Tens unit   5. Chronic, continuous use of opioids            73 y.o.  female with H/o multiple co morbidities having chronic diffuse pain, knee pain, hip pain.     She is on chronic opioids by her PCP- reducing the dose gradually.     Is on anticoagulants.     No role for interventional pain procedure at this point of time.     At this point of time strongly recommend consultation with chronic pain rehab program at [facility] for multi disciplinary approach in managing her pain.     Consider adding low dose adjuvants like gabapentin/ Lyrica- can be done by her Primary care team.     Consider alternative modalities like acupuncture.     Recommend ortho eval for OA.     Counseling : Patient encouraged to stay active and to continue Regular home exercise program as tolerated - stretching / strengthening.     Treatment plan discussed with the patient including medication and procedure side effects.     Controlled Substances Monitoring:      OARRS reviewed     MD Tari Pacheco MD

## 2022-01-12 NOTE — PROGRESS NOTES
Do you currently have any of the following:    Fever: No  Headache:  No  Cough: No  Shortness of breath: No  Exposed to anyone with these symptoms: No         Monica Bhardwaj presents to the [address] [place] [address] on 1/12/2022. Soco Becerril is complaining of pain lower back/shoulders  The pain is constant. The pain is described as aching, throbbing, shooting and stabbing. Pain is rated on her best day at a 8, on her worst day at a 10, and on average at a 8 on the VAS scale. She took her last dose of Percocet this am.      Any procedures since your last visit:     Pacemaker or defibrillator: No managed by     She is not on NSAIDS and is  on anticoagulation medications to include Eliquis and is managed by PCP     Medication Contract and Consent for Opioid Use Documents Filed     Patient Documents     Type of Document Status Date Received Received By Description    Medication Contract Received 1/6/2020  4:43 PM SURY EDWARDS Opioid Medication Contract 1/6/2020                /80   Pulse 68   Temp 96.5 °F (35.8 °C) (Infrared)   Resp 18   Ht 5' (1.524 m)   Wt 182 lb (82.6 kg)   SpO2 100% Comment: 4 L Oxygen  BMI 35.54 kg/m²      No LMP recorded.  Patient is postmenopausal.

## 2022-01-12 NOTE — TELEPHONE ENCOUNTER
Last Appointment:  11/4/2021  Future Appointments   Date Time Provider Mauri Sanford   6/30/2022  2:30 PM Jairon Paiz  [facility]        Patient scheduled with Dr. Cesar Mercado 1/25/22

## 2022-01-20 DIAGNOSIS — J44.9 COPD, MODERATE (HCC): ICD-10-CM

## 2022-01-20 RX ORDER — PROMETHAZINE HYDROCHLORIDE AND CODEINE PHOSPHATE 6.25; 1 MG/5ML; MG/5ML
5 SYRUP ORAL 4 TIMES DAILY PRN
Qty: 473 ML | Refills: 1 | Status: SHIPPED
Start: 2022-01-20 | End: 2022-03-09 | Stop reason: SDUPTHER

## 2022-01-20 NOTE — TELEPHONE ENCOUNTER
Last Appointment:  11/4/2021  Future Appointments   Date Time Provider Mauri Claribel   1/25/2022  2:00 PM [facility]   6/30/2022  2:30 PM Brittny Solares MD [address] [place] [address]

## 2022-02-09 RX ORDER — FAMOTIDINE 20 MG/1
20 TABLET, FILM COATED ORAL 2 TIMES DAILY
Qty: 60 TABLET | Refills: 2 | Status: SHIPPED
Start: 2022-02-09 | End: 2022-06-10 | Stop reason: SDUPTHER

## 2022-02-09 NOTE — TELEPHONE ENCOUNTER
Last Appointment:  11/4/2021  Future Appointments   Date Time Provider Mauri Claribel   2/10/2022  2:20 PM Nadya Borden MD [facility]   6/30/2022  2:30 PM Dee Zamorano MD [address] [place] [address]

## 2022-02-10 ENCOUNTER — OFFICE VISIT (OUTPATIENT)
Dept: FAMILY MEDICINE CLINIC | Age: 67
End: 2022-02-10
Payer: MEDICARE

## 2022-02-10 VITALS
TEMPERATURE: 97 F | WEIGHT: 182 LBS | DIASTOLIC BLOOD PRESSURE: 55 MMHG | RESPIRATION RATE: 18 BRPM | OXYGEN SATURATION: 99 % | SYSTOLIC BLOOD PRESSURE: 114 MMHG | HEART RATE: 57 BPM | HEIGHT: 60 IN | BODY MASS INDEX: 35.73 KG/M2

## 2022-02-10 DIAGNOSIS — G89.29 CHRONIC BILATERAL LOW BACK PAIN WITHOUT SCIATICA: ICD-10-CM

## 2022-02-10 DIAGNOSIS — F11.90 CHRONIC, CONTINUOUS USE OF OPIOIDS: ICD-10-CM

## 2022-02-10 DIAGNOSIS — M54.50 CHRONIC BILATERAL LOW BACK PAIN WITHOUT SCIATICA: ICD-10-CM

## 2022-02-10 DIAGNOSIS — G89.4 CHRONIC PAIN SYNDROME: ICD-10-CM

## 2022-02-10 PROCEDURE — G8399 PT W/DXA RESULTS DOCUMENT: HCPCS | Performed by: FAMILY MEDICINE

## 2022-02-10 PROCEDURE — 3017F COLORECTAL CA SCREEN DOC REV: CPT | Performed by: FAMILY MEDICINE

## 2022-02-10 PROCEDURE — G8417 CALC BMI ABV UP PARAM F/U: HCPCS | Performed by: FAMILY MEDICINE

## 2022-02-10 PROCEDURE — 4040F PNEUMOC VAC/ADMIN/RCVD: CPT | Performed by: FAMILY MEDICINE

## 2022-02-10 PROCEDURE — 99212 OFFICE O/P EST SF 10 MIN: CPT | Performed by: FAMILY MEDICINE

## 2022-02-10 PROCEDURE — 99213 OFFICE O/P EST LOW 20 MIN: CPT | Performed by: FAMILY MEDICINE

## 2022-02-10 PROCEDURE — G8427 DOCREV CUR MEDS BY ELIG CLIN: HCPCS | Performed by: FAMILY MEDICINE

## 2022-02-10 PROCEDURE — G8482 FLU IMMUNIZE ORDER/ADMIN: HCPCS | Performed by: FAMILY MEDICINE

## 2022-02-10 PROCEDURE — 1090F PRES/ABSN URINE INCON ASSESS: CPT | Performed by: FAMILY MEDICINE

## 2022-02-10 PROCEDURE — 1123F ACP DISCUSS/DSCN MKR DOCD: CPT | Performed by: FAMILY MEDICINE

## 2022-02-10 PROCEDURE — 1036F TOBACCO NON-USER: CPT | Performed by: FAMILY MEDICINE

## 2022-02-10 RX ORDER — OXYCODONE HYDROCHLORIDE AND ACETAMINOPHEN 5; 325 MG/1; MG/1
1 TABLET ORAL EVERY 4 HOURS PRN
Qty: 150 TABLET | Refills: 0 | Status: SHIPPED
Start: 2022-02-10 | End: 2022-03-09 | Stop reason: SDUPTHER

## 2022-02-10 RX ORDER — GABAPENTIN 300 MG/1
300 CAPSULE ORAL NIGHTLY
Qty: 90 CAPSULE | Refills: 0 | Status: SHIPPED
Start: 2022-02-10 | End: 2022-06-13 | Stop reason: SDUPTHER

## 2022-02-10 NOTE — PROGRESS NOTES
CC:  Change to pain medication prescription , Post herpetic neuralgia    HPI:  73 y.o. female pmh of HTN, PAF, CHF, hypothyroidism, sarcoidosis presents for general follow up. Hx Shingles   Patient states that she had an episode of shingles which affected her left upper back. Has completed shingles vaccination series ; last dose August 2021. Has had residual symptoms of post herpetic neuralgia since that time, continues to experience some itching, burning sensation to that region. Notes to have some scarring in that region as well. Has trialed mupirocin on this region which she states does \"help\" but does resolve symptoms. History of chronic pain  Patient has been receiving percocet 5-325 mg q4 prn for her chronic pain  States that her shoulders, abdomen and legs all are very painful all the time. Has previously hx of abdominal surgery, with a chronic healing wound. States that the surgery was many years ago, but that her wound has never completely healed. Patient was previously referred to pain management : [facility] ; charts reviewed -- Patient advised to continue with current regimen, and advised PCP of adding lyrica/gabapentin if required. At this time, patient asking for medication dose to be increased. States that she is taking her current dose approximately 5 times a day, but would feel better with increased dose. No other concerns at this time. ROS:    Review of Systems   Constitutional: Negative for activity change and appetite change. Respiratory: Negative for chest tightness and shortness of breath. Cardiovascular: Negative for chest pain and leg swelling. Musculoskeletal: Positive for arthralgias, back pain, gait problem and myalgias. Skin: Positive for wound. Chronic abdominal healing wound   Neurological: Negative for dizziness and weakness. Psychiatric/Behavioral: Negative for sleep disturbance. The patient is not nervous/anxious. Objective:    VS:  Blood pressure (!) 114/55, pulse 57, temperature 97 °F (36.1 °C), temperature source Temporal, resp. rate 18, height 5' (1.524 m), weight 182 lb (82.6 kg), SpO2 99 %, not currently breastfeeding. Physical Exam  Cardiovascular:      Rate and Rhythm: Normal rate and regular rhythm. Pulses: Normal pulses. Heart sounds: Normal heart sounds. No murmur heard. Pulmonary:      Effort: Pulmonary effort is normal. No respiratory distress. Breath sounds: Normal breath sounds. No wheezing. Abdominal:      Comments: Umbilical hernia with chronic healing wound  Noted granulation tissue  Approximately 5 cm in diameter  No purulent drainage noted   Skin:     Comments: Healing scars left upper back (s/p shingles breakout)       Assessment/Plan    1. Chronic pain syndrome  Previous PCP charts reviewed  Patients percocet dose will not be increased  PDMP reviewed  Continue present dose and continue to wean appropriately  Advised trial of gabapentin. Patient has attempted before, at a lower dose. Will start at 300 mg per night and titrate gabapentin up , while titrating percocet down. - gabapentin (NEURONTIN) 300 MG capsule; Take 1 capsule by mouth at bedtime for 90 days. Dispense: 90 capsule; Refill: 0    2. Chronic, continuous use of opioids  Continue present dose  Medication renewed today  Will look for another pain management specialist this patient at this time. - oxyCODONE-acetaminophen (PERCOCET) 5-325 MG per tablet; Take 1 tablet by mouth every 4 hours as needed for Pain (max: 150/month) for up to 30 days. Dispense: 150 tablet; Refill: 0    3. Chronic bilateral low back pain without sciatica  - Add gabapentin  - continue present dose of percocet and wean appropriately. - oxyCODONE-acetaminophen (PERCOCET) 5-325 MG per tablet; Take 1 tablet by mouth every 4 hours as needed for Pain (max: 150/month) for up to 30 days. Dispense: 150 tablet;  Refill: 0     RTO 1 month or sooner prn for any persistent, new, or worsening symptoms. Please see Patient Instructions for further counseling and information given. Advised to please be adherent to the treatment plans discussed today, and please call with any questions or concerns, letting the office know of any reasons that the plans may not be followed. The risks of untreated conditions include worsening illness, injury, disability, and possibly, death. Please call if symptoms change in any way, worsen, or fail to completely resolve, as this could necessitate a change to treatment plans. Patient and/or caregiver expressed understanding. Indications and proper use of medication(s) reviewed. Potential side-effects and risks of medication(s) also explained. Patient and/or caregiver was instructed to call if any new symptoms develop prior to next visit. Health risk factors discussed and addressed.      Electronically signed by Severa Chock, MD PGY-2 on 2/10/2022 at 3:01 PM  This case was discussed with attending physician: Dr. Lindy Maya

## 2022-02-10 NOTE — PROGRESS NOTES
Attending Physician Statement    S:   Chief Complaint   Patient presents with    Other     refills     Discuss Medications     patient would like an increae on her pain medications      History of chronic pain, chronic abdominal wound, possible shingles   Abdomen wound post-op which has not resolved over years. Previously seen by wound care, but not recently   Chronic pain - would like increase on her pain medication   Has itching and burning left upper back since shingles last year  O: Blood pressure (!) 114/55, pulse 57, temperature 97 °F (36.1 °C), temperature source Temporal, resp. rate 18, height 5' (1.524 m), weight 182 lb (82.6 kg), SpO2 99 %, not currently breastfeeding. Exam:   Heart - RRR   Lungs - clear   Hyperpigmented scarring of left upper back   Abdomen - open wound (5 cm)  A: As above  P:  Percocet 5 rf at current dose   Refer back to wound care   Add gabapentin 100 hs   Follow-up as ordered    I have discussed the case, including pertinent history and exam findings with the resident. I agree with the documented assessment and plan.

## 2022-02-11 ASSESSMENT — ENCOUNTER SYMPTOMS
CHEST TIGHTNESS: 0
BACK PAIN: 1
SHORTNESS OF BREATH: 0

## 2022-02-19 ENCOUNTER — HOSPITAL ENCOUNTER (EMERGENCY)
Age: 67
Discharge: HOME | End: 2022-02-19
Attending: EMERGENCY MEDICINE
Payer: MEDICARE

## 2022-02-19 ENCOUNTER — APPOINTMENT (OUTPATIENT)
Dept: CT IMAGING | Age: 67
End: 2022-02-19
Payer: MEDICARE

## 2022-02-19 VITALS
DIASTOLIC BLOOD PRESSURE: 79 MMHG | RESPIRATION RATE: 14 BRPM | TEMPERATURE: 98 F | SYSTOLIC BLOOD PRESSURE: 140 MMHG | OXYGEN SATURATION: 96 % | HEART RATE: 80 BPM

## 2022-02-19 DIAGNOSIS — K62.3 RP (RECTAL PROLAPSE): ICD-10-CM

## 2022-02-19 DIAGNOSIS — K43.9 VENTRAL HERNIA WITHOUT OBSTRUCTION OR GANGRENE: ICD-10-CM

## 2022-02-19 DIAGNOSIS — D86.9 SARCOIDOSIS: ICD-10-CM

## 2022-02-19 DIAGNOSIS — J41.8 MIXED SIMPLE AND MUCOPURULENT CHRONIC BRONCHITIS (HCC): ICD-10-CM

## 2022-02-19 DIAGNOSIS — J44.1 COPD EXACERBATION (HCC): Primary | ICD-10-CM

## 2022-02-19 LAB
ALBUMIN SERPL-MCNC: 3.9 G/DL (ref 3.5–5.2)
ALP BLD-CCNC: 69 U/L (ref 35–104)
ALT SERPL-CCNC: 14 U/L (ref 0–32)
ANION GAP SERPL CALCULATED.3IONS-SCNC: 9 MMOL/L (ref 7–16)
AST SERPL-CCNC: 34 U/L (ref 0–31)
BACTERIA: ABNORMAL /HPF
BASOPHILS ABSOLUTE: 0.01 E9/L (ref 0–0.2)
BASOPHILS RELATIVE PERCENT: 0.2 % (ref 0–2)
BILIRUB SERPL-MCNC: 0.4 MG/DL (ref 0–1.2)
BILIRUBIN URINE: NEGATIVE
BLOOD, URINE: NEGATIVE
BUN BLDV-MCNC: 11 MG/DL (ref 6–23)
CALCIUM SERPL-MCNC: 9.2 MG/DL (ref 8.6–10.2)
CHLORIDE BLD-SCNC: 99 MMOL/L (ref 98–107)
CLARITY: CLEAR
CO2: 33 MMOL/L (ref 22–29)
COLOR: YELLOW
CREAT SERPL-MCNC: 1.2 MG/DL (ref 0.5–1)
EKG ATRIAL RATE: 81 BPM
EKG P AXIS: 85 DEGREES
EKG P-R INTERVAL: 132 MS
EKG Q-T INTERVAL: 392 MS
EKG QRS DURATION: 76 MS
EKG QTC CALCULATION (BAZETT): 455 MS
EKG R AXIS: 48 DEGREES
EKG T AXIS: 46 DEGREES
EKG VENTRICULAR RATE: 81 BPM
EOSINOPHILS ABSOLUTE: 0.3 E9/L (ref 0.05–0.5)
EOSINOPHILS RELATIVE PERCENT: 4.9 % (ref 0–6)
GFR AFRICAN AMERICAN: 55
GFR NON-AFRICAN AMERICAN: 55 ML/MIN/1.73
GLUCOSE BLD-MCNC: 97 MG/DL (ref 74–99)
GLUCOSE URINE: NEGATIVE MG/DL
HCT VFR BLD CALC: 30.8 % (ref 34–48)
HEMOGLOBIN: 8.7 G/DL (ref 11.5–15.5)
HYPOCHROMIA: ABNORMAL
IMMATURE GRANULOCYTES #: 0.03 E9/L
IMMATURE GRANULOCYTES %: 0.5 % (ref 0–5)
KETONES, URINE: NEGATIVE MG/DL
LACTIC ACID: 1.1 MMOL/L (ref 0.5–2.2)
LEUKOCYTE ESTERASE, URINE: NEGATIVE
LYMPHOCYTES ABSOLUTE: 0.68 E9/L (ref 1.5–4)
LYMPHOCYTES RELATIVE PERCENT: 11.1 % (ref 20–42)
MAGNESIUM: 1.7 MG/DL (ref 1.6–2.6)
MCH RBC QN AUTO: 25.4 PG (ref 26–35)
MCHC RBC AUTO-ENTMCNC: 28.2 % (ref 32–34.5)
MCV RBC AUTO: 90.1 FL (ref 80–99.9)
MONOCYTES ABSOLUTE: 0.83 E9/L (ref 0.1–0.95)
MONOCYTES RELATIVE PERCENT: 13.6 % (ref 2–12)
NEUTROPHILS ABSOLUTE: 4.25 E9/L (ref 1.8–7.3)
NEUTROPHILS RELATIVE PERCENT: 69.7 % (ref 43–80)
NITRITE, URINE: NEGATIVE
OVALOCYTES: ABNORMAL
PDW BLD-RTO: 14.6 FL (ref 11.5–15)
PH UA: 6 (ref 5–9)
PLATELET # BLD: 147 E9/L (ref 130–450)
PMV BLD AUTO: 11.1 FL (ref 7–12)
POIKILOCYTES: ABNORMAL
POLYCHROMASIA: ABNORMAL
POTASSIUM REFLEX MAGNESIUM: 3.2 MMOL/L (ref 3.5–5)
PRO-BNP: 2287 PG/ML (ref 0–125)
PROTEIN UA: NEGATIVE MG/DL
RBC # BLD: 3.42 E12/L (ref 3.5–5.5)
RBC UA: ABNORMAL /HPF (ref 0–2)
SARS-COV-2, NAAT: NOT DETECTED
SODIUM BLD-SCNC: 141 MMOL/L (ref 132–146)
SPECIFIC GRAVITY UA: <=1.005 (ref 1–1.03)
TARGET CELLS: ABNORMAL
TOTAL PROTEIN: 6.6 G/DL (ref 6.4–8.3)
TROPONIN, HIGH SENSITIVITY: 21 NG/L (ref 0–9)
UROBILINOGEN, URINE: 0.2 E.U./DL
WBC # BLD: 6.1 E9/L (ref 4.5–11.5)
WBC UA: ABNORMAL /HPF (ref 0–5)

## 2022-02-19 PROCEDURE — 84484 ASSAY OF TROPONIN QUANT: CPT

## 2022-02-19 PROCEDURE — 87088 URINE BACTERIA CULTURE: CPT

## 2022-02-19 PROCEDURE — 6360000002 HC RX W HCPCS: Performed by: EMERGENCY MEDICINE

## 2022-02-19 PROCEDURE — 93010 ELECTROCARDIOGRAM REPORT: CPT | Performed by: INTERNAL MEDICINE

## 2022-02-19 PROCEDURE — 71275 CT ANGIOGRAPHY CHEST: CPT

## 2022-02-19 PROCEDURE — 74177 CT ABD & PELVIS W/CONTRAST: CPT

## 2022-02-19 PROCEDURE — 85025 COMPLETE CBC W/AUTO DIFF WBC: CPT

## 2022-02-19 PROCEDURE — 83605 ASSAY OF LACTIC ACID: CPT

## 2022-02-19 PROCEDURE — 6360000004 HC RX CONTRAST MEDICATION: Performed by: RADIOLOGY

## 2022-02-19 PROCEDURE — 87186 SC STD MICRODIL/AGAR DIL: CPT

## 2022-02-19 PROCEDURE — 99285 EMERGENCY DEPT VISIT HI MDM: CPT

## 2022-02-19 PROCEDURE — 6370000000 HC RX 637 (ALT 250 FOR IP): Performed by: STUDENT IN AN ORGANIZED HEALTH CARE EDUCATION/TRAINING PROGRAM

## 2022-02-19 PROCEDURE — 80053 COMPREHEN METABOLIC PANEL: CPT

## 2022-02-19 PROCEDURE — 81001 URINALYSIS AUTO W/SCOPE: CPT

## 2022-02-19 PROCEDURE — 6360000002 HC RX W HCPCS: Performed by: STUDENT IN AN ORGANIZED HEALTH CARE EDUCATION/TRAINING PROGRAM

## 2022-02-19 PROCEDURE — 93005 ELECTROCARDIOGRAM TRACING: CPT | Performed by: STUDENT IN AN ORGANIZED HEALTH CARE EDUCATION/TRAINING PROGRAM

## 2022-02-19 PROCEDURE — 96374 THER/PROPH/DIAG INJ IV PUSH: CPT

## 2022-02-19 PROCEDURE — 87635 SARS-COV-2 COVID-19 AMP PRB: CPT

## 2022-02-19 PROCEDURE — 96375 TX/PRO/DX INJ NEW DRUG ADDON: CPT

## 2022-02-19 PROCEDURE — 83880 ASSAY OF NATRIURETIC PEPTIDE: CPT

## 2022-02-19 PROCEDURE — 83735 ASSAY OF MAGNESIUM: CPT

## 2022-02-19 RX ORDER — ALBUTEROL SULFATE 2.5 MG/3ML
2.5 SOLUTION RESPIRATORY (INHALATION) EVERY 6 HOURS PRN
Qty: 120 EACH | Refills: 2 | Status: SHIPPED | OUTPATIENT
Start: 2022-02-19

## 2022-02-19 RX ORDER — PREDNISONE 1 MG/1
5 TABLET ORAL DAILY
Qty: 30 TABLET | Refills: 5 | Status: SHIPPED | OUTPATIENT
Start: 2022-02-19 | End: 2022-07-08 | Stop reason: SDUPTHER

## 2022-02-19 RX ORDER — FENTANYL CITRATE 50 UG/ML
50 INJECTION, SOLUTION INTRAMUSCULAR; INTRAVENOUS ONCE
Status: COMPLETED | OUTPATIENT
Start: 2022-02-19 | End: 2022-02-19

## 2022-02-19 RX ORDER — ALBUTEROL SULFATE 90 UG/1
2 AEROSOL, METERED RESPIRATORY (INHALATION) EVERY 6 HOURS PRN
Qty: 1 EACH | Refills: 5 | Status: SHIPPED | OUTPATIENT
Start: 2022-02-19 | End: 2022-05-17 | Stop reason: SDUPTHER

## 2022-02-19 RX ORDER — IPRATROPIUM BROMIDE AND ALBUTEROL SULFATE 2.5; .5 MG/3ML; MG/3ML
3 SOLUTION RESPIRATORY (INHALATION) ONCE
Status: COMPLETED | OUTPATIENT
Start: 2022-02-19 | End: 2022-02-19

## 2022-02-19 RX ORDER — METHYLPREDNISOLONE SODIUM SUCCINATE 125 MG/2ML
125 INJECTION, POWDER, LYOPHILIZED, FOR SOLUTION INTRAMUSCULAR; INTRAVENOUS ONCE
Status: COMPLETED | OUTPATIENT
Start: 2022-02-19 | End: 2022-02-19

## 2022-02-19 RX ADMIN — FENTANYL CITRATE 50 MCG: 50 INJECTION INTRAMUSCULAR; INTRAVENOUS at 06:48

## 2022-02-19 RX ADMIN — IPRATROPIUM BROMIDE AND ALBUTEROL SULFATE 3 AMPULE: .5; 2.5 SOLUTION RESPIRATORY (INHALATION) at 06:03

## 2022-02-19 RX ADMIN — METHYLPREDNISOLONE SODIUM SUCCINATE 125 MG: 125 INJECTION, POWDER, FOR SOLUTION INTRAMUSCULAR; INTRAVENOUS at 06:03

## 2022-02-19 RX ADMIN — IOPAMIDOL 75 ML: 755 INJECTION, SOLUTION INTRAVENOUS at 08:34

## 2022-02-19 ASSESSMENT — ENCOUNTER SYMPTOMS
EYE DISCHARGE: 0
EYE PAIN: 0
VOMITING: 0
SINUS PRESSURE: 0
SHORTNESS OF BREATH: 1
BACK PAIN: 0
ABDOMINAL PAIN: 1
EYE REDNESS: 0
NAUSEA: 0
DIARRHEA: 0
WHEEZING: 1
ABDOMINAL DISTENTION: 0
SORE THROAT: 0
COUGH: 1

## 2022-02-19 ASSESSMENT — PAIN SCALES - GENERAL
PAINLEVEL_OUTOF10: 8
PAINLEVEL_OUTOF10: 10
PAINLEVEL_OUTOF10: 10
PAINLEVEL_OUTOF10: 7

## 2022-02-19 ASSESSMENT — PAIN - FUNCTIONAL ASSESSMENT: PAIN_FUNCTIONAL_ASSESSMENT: 0-10

## 2022-02-19 ASSESSMENT — PAIN DESCRIPTION - LOCATION: LOCATION: HEAD

## 2022-02-19 ASSESSMENT — PAIN DESCRIPTION - DESCRIPTORS: DESCRIPTORS: ACHING

## 2022-02-19 NOTE — ED NOTES
9:56 AM EST    I received this patient at sign out from Dr. Patti Valdez   I have discussed the patient's initial exam, treatment and plan of care with the out going physician. I have introduced my self to the patient / family and have answered their questions to this point. I have examined the patient myself and reviewed ordered tests / medications and reviewed any available results to this point. If a resident is involved in the Emergency Department care, I have discussed my findings and plan with them as well. I reviewed all diagnostics with the patient including CT scan of the chest was unremarkable for PE.  CT scan of the abdomen demonstrates fatty tissue in the diastased hernia no evidence of obstruction. On exam patient's hernia is soft to palpation there is no rebound tenderness or guarding. Lung sounds are clear bilaterally. Patient is currently on several liters oxygen at home. She is currently 93%. Afebrile blood pressure 146/85. I did discuss with the patient admission for COPD exacerbation states she wants to go home. At that point she told me she has rectal prolapse she is on stool softeners. I did examine her rectum there was no evidence of prolapse or bleeding or mass. I did tell her she needs to continue with her stool softeners. She is to follow-up with her PCP as well as follow-up with her gynecologic surgeon she recently had a vaginectomy and has not had follow-up since the surgery. Patient also stated she does have a surgeon and will follow up for her rectal prolapse.      Blair France, DO  02/19/22 1962

## 2022-02-19 NOTE — ED PROVIDER NOTES
Chief Complaint   Patient presents with    Shortness of Breath       Patient is a 78-year-old female with a history of COPD, sarcoidosis, atrial fibrillation on Eliquis who presents today for shortness of breath. She did get a new breathing machine at home and was unsure how to use it. She wears 4 L oxygen at baseline. EMS states she was mildly hypoxic, however this did improve after DuoNeb was given in route. She states that for the past week she has not been taking her Eliquis as she has had more frequent episodes with rectal prolapse and has had some bleeding with bowel movements. She has seen general surgery in the past and had surgery for this. She does endorse generalized abdominal discomfort, has a history of known hernia. Symptoms today have been persistent, mild in severity, no aggravating alleviating factors. Has not tried any other medication for the symptoms today. On arrival is stable on her home 4 L oxygen. Has wheezing throughout. The history is provided by the patient. No  was used. Review of Systems   Constitutional: Positive for fatigue. Negative for chills and fever. HENT: Negative for ear pain, sinus pressure and sore throat. Eyes: Negative for pain, discharge and redness. Respiratory: Positive for cough, shortness of breath and wheezing. Cardiovascular: Negative for chest pain. Gastrointestinal: Positive for abdominal pain. Negative for abdominal distention, diarrhea, nausea and vomiting. Genitourinary: Negative for dysuria and frequency. Musculoskeletal: Negative for arthralgias and back pain. Skin: Negative for rash and wound. Neurological: Negative for weakness and headaches. Hematological: Negative for adenopathy. Psychiatric/Behavioral: Negative for behavioral problems. All other systems reviewed and are negative. Physical Exam  Vitals and nursing note reviewed.    Constitutional:       General: She is not in acute distress. Appearance: She is well-developed. She is obese. She is not ill-appearing. HENT:      Head: Normocephalic and atraumatic. Eyes:      Pupils: Pupils are equal, round, and reactive to light. Cardiovascular:      Rate and Rhythm: Normal rate and regular rhythm. Pulses: Normal pulses. Heart sounds: Normal heart sounds. Pulmonary:      Effort: Pulmonary effort is normal. No respiratory distress. Breath sounds: Wheezing present. No rales. Comments: Wheezing throughout  On nasal cannula  Abdominal:      General: Bowel sounds are normal.      Palpations: Abdomen is soft. Tenderness: There is abdominal tenderness (Generalized tenderness). There is no guarding or rebound. Comments: Hernia noted, abdomen is soft, no overlying skin changes   Genitourinary:     Rectum: Normal.   Musculoskeletal:      Cervical back: Normal range of motion and neck supple. No rigidity or tenderness. Right lower leg: No edema. Left lower leg: No edema. Skin:     General: Skin is warm and dry. Capillary Refill: Capillary refill takes less than 2 seconds. Neurological:      General: No focal deficit present. Mental Status: She is alert and oriented to person, place, and time. Cranial Nerves: No cranial nerve deficit.       Coordination: Coordination normal.          Procedures     Labs Reviewed   CBC WITH AUTO DIFFERENTIAL - Abnormal; Notable for the following components:       Result Value    RBC 3.42 (*)     Hemoglobin 8.7 (*)     Hematocrit 30.8 (*)     MCH 25.4 (*)     MCHC 28.2 (*)     Lymphocytes % 11.1 (*)     Monocytes % 13.6 (*)     Lymphocytes Absolute 0.68 (*)     All other components within normal limits   COMPREHENSIVE METABOLIC PANEL W/ REFLEX TO MG FOR LOW K - Abnormal; Notable for the following components:    Potassium reflex Magnesium 3.2 (*)     CO2 33 (*)     CREATININE 1.2 (*)     AST 34 (*)     All other components within normal limits   TROPONIN - Abnormal; Notable for the following components:    Troponin, High Sensitivity 21 (*)     All other components within normal limits   BRAIN NATRIURETIC PEPTIDE - Abnormal; Notable for the following components:    Pro-BNP 2,287 (*)     All other components within normal limits   URINALYSIS WITH MICROSCOPIC - Abnormal; Notable for the following components:    Bacteria, UA FEW (*)     All other components within normal limits   COVID-19, RAPID   CULTURE, URINE   LACTIC ACID   MAGNESIUM     CTA PULMONARY W CONTRAST   Final Result   1. No CT evidence of pulmonary embolism. 2.  Enlarged main pulmonary artery compatible pulmonary artery hypertension. 3.  Significant motion artifact. CT ABDOMEN PELVIS W IV CONTRAST Additional Contrast? None   Final Result   Diastasis identified of the rectus muscles with laxity of the abdominal wall   and herniation inferiorly containing bowel and fat with no evidence of   strangulation. There is no acute intra-abdominal or pelvic abnormality   present. EKG #1:   I personally interpreted this EKG  Time:  0541    Rate: 81  Rhythm: Sinus. Interpretation: Sinus rhythm, normal axis, no ST elevation. MDM  Number of Diagnoses or Management Options  COPD exacerbation ([address]  RP (rectal prolapse)  Ventral hernia without obstruction or gangrene  Diagnosis management comments: Patient is a 73year old female who presents for COPD exacerbation. She is stable on her home o2. She has wheezing throughout. She was given steroids and duonebs on arrival with improvement of symptoms. She has been off eliquis for 1 week, therefore CTA was obtained to evaluate for PE. No evidence of PE noted, no infectious infiltrates. Troponin stable,  EKG shows no ischemic changes. CT abdomen notes a hernia, no evidence of obstruction or strangulation. Abdomen is soft. Cr improved from baseline. She has remained stable on her home oxygen.   With benign workup she will be discharged home and instructed to follow up with pcp and surgery. Return precautions given. Amount and/or Complexity of Data Reviewed  Clinical lab tests: reviewed                --------------------------------------------- PAST HISTORY ---------------------------------------------  Past Medical History:  has a past medical history of A-fib ([facility] [address] Able to transfer from chair to wheelchair, Abnormal mammogram, Acute on chronic respiratory failure ([address] Anemia, Anemia due to chronic illness, Ankle fracture, left, 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[address] Backache, Benign hypertension, CHF (congestive heart failure) ([address] Chronic back pain, Chronic kidney disease, Chronic pain disorder, Chronic, continuous use of opioids, Compression fracture of thoracic vertebra (HCC), COPD (chronic obstructive pulmonary disease) ([address] Debility, Deformity of ankle and foot, acquired, Depression, Diabetes insipidus ([address] Diverticulitis, Dry eyes, Encephalopathy acute, Gait disturbance, GERD (gastroesophageal reflux disease), Hemorrhoids, Hernia, History of blood transfusion, History of Clostridium difficile, Hyperlipidemia, Hypothyroidism, Ischemic colitis, enteritis, or enterocolitis ([address] Long-term current use of steroids, Lumbar disc herniation, Myalgia and myositis, unspecified, Nephrosclerosis, Nonunion of fracture, Osteoarthritis, PAF (paroxysmal atrial fibrillation) ([address] 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[address] Pulmonary hypertension ([address] Pulmonary sarcoidosis ([address] Rectal bleeding, Rhabdomyolysis, Steroid-induced avascular necrosis of shoulder ([address] and Tibia fracture. Past Surgical History:  has a past surgical history that includes fracture surgery (2010); Kyphosis surgery; Lumbar disc surgery; Tibia / fibia lengthening; other surgical history (11/1/11); Abdomen surgery (2008); Echo Complete (4/22/2013); ECHO Compl W Dop Color Flow (8/16/2015); Upper gastrointestinal endoscopy (1/4/2016);  Hemorrhoid surgery (12/08/2016); Colonoscopy (2008); Colonoscopy (04/11/2014); Colonoscopy (11/23/2015); Colonoscopy (10/24/2016); Colonoscopy (07/26/2017); Upper gastrointestinal endoscopy (07/26/2017); sigmoidoscopy (N/A, 3/19/2021); and other surgical history (12/14/2021). Social History:  reports that she quit smoking about 25 years ago. Her smoking use included cigarettes. She started smoking about 51 years ago. She has a 18.75 pack-year smoking history. She has never used smokeless tobacco. She reports current drug use. She reports that she does not drink alcohol. Family History: family history includes Alcohol Abuse in her sister; Arthritis in her father and mother; Diabetes in her father and mother; High Blood Pressure in her father, mother, and sister; Substance Abuse in her brother and sister. The patients home medications have been reviewed. Allergies: Patient has no known allergies.     -------------------------------------------------- RESULTS -------------------------------------------------  Labs:  Results for orders placed or performed during the hospital encounter of 02/19/22   COVID-19, Rapid    Specimen: Nasopharyngeal Swab   Result Value Ref Range    SARS-CoV-2, NAAT Not Detected Not Detected   CBC with Auto Differential   Result Value Ref Range    WBC 6.1 4.5 - 11.5 E9/L    RBC 3.42 (L) 3.50 - 5.50 E12/L    Hemoglobin 8.7 (L) 11.5 - 15.5 g/dL    Hematocrit 30.8 (L) 34.0 - 48.0 %    MCV 90.1 80.0 - 99.9 fL    MCH 25.4 (L) 26.0 - 35.0 pg    MCHC 28.2 (L) 32.0 - 34.5 %    RDW 14.6 11.5 - 15.0 fL    Platelets 292 750 - 511 E9/L    MPV 11.1 7.0 - 12.0 fL    Neutrophils % 69.7 43.0 - 80.0 %    Immature Granulocytes % 0.5 0.0 - 5.0 %    Lymphocytes % 11.1 (L) 20.0 - 42.0 %    Monocytes % 13.6 (H) 2.0 - 12.0 %    Eosinophils % 4.9 0.0 - 6.0 %    Basophils % 0.2 0.0 - 2.0 %    Neutrophils Absolute 4.25 1.80 - 7.30 E9/L    Immature Granulocytes # 0.03 E9/L    Lymphocytes Absolute 0.68 (L) 1.50 - 4.00 E9/L Monocytes Absolute 0.83 0.10 - 0.95 E9/L    Eosinophils Absolute 0.30 0.05 - 0.50 E9/L    Basophils Absolute 0.01 0.00 - 0.20 E9/L    Polychromasia 1+     Hypochromia 2+     Poikilocytes 1+     Ovalocytes 1+     Target Cells 1+    Comprehensive Metabolic Panel w/ Reflex to MG   Result Value Ref Range    Sodium 141 132 - 146 mmol/L    Potassium reflex Magnesium 3.2 (L) 3.5 - 5.0 mmol/L    Chloride 99 98 - 107 mmol/L    CO2 33 (H) 22 - 29 mmol/L    Anion Gap 9 7 - 16 mmol/L    Glucose 97 74 - 99 mg/dL    BUN 11 6 - 23 mg/dL    CREATININE 1.2 (H) 0.5 - 1.0 mg/dL    GFR Non-African American 55 >=60 mL/min/1.73    GFR African American 55     Calcium 9.2 8.6 - 10.2 mg/dL    Total Protein 6.6 6.4 - 8.3 g/dL    Albumin 3.9 3.5 - 5.2 g/dL    Total Bilirubin 0.4 0.0 - 1.2 mg/dL    Alkaline Phosphatase 69 35 - 104 U/L    ALT 14 0 - 32 U/L    AST 34 (H) 0 - 31 U/L   Troponin   Result Value Ref Range    Troponin, High Sensitivity 21 (H) 0 - 9 ng/L   Brain Natriuretic Peptide   Result Value Ref Range    Pro-BNP 2,287 (H) 0 - 125 pg/mL   Urinalysis with Microscopic   Result Value Ref Range    Color, UA Yellow Straw/Yellow    Clarity, UA Clear Clear    Glucose, Ur Negative Negative mg/dL    Bilirubin Urine Negative Negative    Ketones, Urine Negative Negative mg/dL    Specific Gravity, UA <=1.005 1.005 - 1.030    Blood, Urine Negative Negative    pH, UA 6.0 5.0 - 9.0    Protein, UA Negative Negative mg/dL    Urobilinogen, Urine 0.2 <2.0 E.U./dL    Nitrite, Urine Negative Negative    Leukocyte Esterase, Urine Negative Negative    WBC, UA 0-1 0 - 5 /HPF    RBC, UA NONE 0 - 2 /HPF    Bacteria, UA FEW (A) None Seen /HPF   Lactic Acid   Result Value Ref Range    Lactic Acid 1.1 0.5 - 2.2 mmol/L   Magnesium   Result Value Ref Range    Magnesium 1.7 1.6 - 2.6 mg/dL   EKG 12 Lead   Result Value Ref Range    Ventricular Rate 81 BPM    Atrial Rate 81 BPM    P-R Interval 132 ms    QRS Duration 76 ms    Q-T Interval 392 ms    QTc Calculation ([place]) 455 ms    P Axis 85 degrees    R Axis 48 degrees    T Axis 46 degrees       Radiology:  CTA PULMONARY W CONTRAST   Final Result   1. No CT evidence of pulmonary embolism. 2.  Enlarged main pulmonary artery compatible pulmonary artery hypertension. 3.  Significant motion artifact. CT ABDOMEN PELVIS W IV CONTRAST Additional Contrast? None   Final Result   Diastasis identified of the rectus muscles with laxity of the abdominal wall   and herniation inferiorly containing bowel and fat with no evidence of   strangulation. There is no acute intra-abdominal or pelvic abnormality   present.             ------------------------- NURSING NOTES AND VITALS REVIEWED ---------------------------  Date / Time Roomed:  2/19/2022  5:06 AM  ED Bed Assignment:  05/05    The nursing notes within the ED encounter and vital signs as below have been reviewed. BP (!) 140/79   Pulse 80   Temp 98 °F (36.7 °C) (Oral)   Resp 14   SpO2 96%   Oxygen Saturation Interpretation: Normal      ------------------------------------------ PROGRESS NOTES ------------------------------------------  I have spoken with the patient and discussed todays results, in addition to providing specific details for the plan of care and counseling regarding the diagnosis and prognosis. Their questions are answered at this time and they are agreeable with the plan. I discussed at length with them reasons for immediate return here for re evaluation. They will followup with primary care by calling their office tomorrow. --------------------------------- ADDITIONAL PROVIDER NOTES ---------------------------------  At this time the patient is without objective evidence of an acute process requiring hospitalization or inpatient management. They have remained hemodynamically stable throughout their entire ED visit and are stable for discharge with outpatient follow-up.      The plan has been discussed in detail and they are aware of the specific conditions for emergent return, as well as the importance of follow-up. Discharge Medication List as of 2/19/2022 10:46 AM          Diagnosis:  1. COPD exacerbation ([facility] [address]    2. Ventral hernia without obstruction or gangrene    3. RP (rectal prolapse)    4. Sarcoidosis    5. Mixed simple and mucopurulent chronic bronchitis ([facility] [address]        Disposition:  Patient's disposition: Discharge to home  Patient's condition is stable.             Marsha Nash,   Resident  02/19/22 [id number]

## 2022-02-21 LAB
ORGANISM: ABNORMAL
URINE CULTURE, ROUTINE: ABNORMAL
URINE CULTURE, ROUTINE: ABNORMAL

## 2022-02-28 ENCOUNTER — HOSPITAL ENCOUNTER (EMERGENCY)
Age: 67
Discharge: HOME | End: 2022-03-01
Attending: EMERGENCY MEDICINE
Payer: MEDICARE

## 2022-02-28 DIAGNOSIS — R04.0 EPISTAXIS: Primary | ICD-10-CM

## 2022-02-28 PROCEDURE — 30903 CONTROL OF NOSEBLEED: CPT

## 2022-02-28 PROCEDURE — 96376 TX/PRO/DX INJ SAME DRUG ADON: CPT

## 2022-02-28 PROCEDURE — 96374 THER/PROPH/DIAG INJ IV PUSH: CPT

## 2022-02-28 PROCEDURE — 99285 EMERGENCY DEPT VISIT HI MDM: CPT

## 2022-02-28 PROCEDURE — 6370000000 HC RX 637 (ALT 250 FOR IP): Performed by: STUDENT IN AN ORGANIZED HEALTH CARE EDUCATION/TRAINING PROGRAM

## 2022-02-28 RX ORDER — TRANEXAMIC ACID 100 MG/ML
1000 INJECTION, SOLUTION INTRAVENOUS ONCE
Status: COMPLETED | OUTPATIENT
Start: 2022-02-28 | End: 2022-03-01

## 2022-02-28 RX ORDER — LIDOCAINE HYDROCHLORIDE AND EPINEPHRINE 10; 10 MG/ML; UG/ML
20 INJECTION, SOLUTION INFILTRATION; PERINEURAL ONCE
Status: COMPLETED | OUTPATIENT
Start: 2022-02-28 | End: 2022-03-01

## 2022-02-28 RX ORDER — ONDANSETRON 4 MG/1
4 TABLET, ORALLY DISINTEGRATING ORAL ONCE
Status: COMPLETED | OUTPATIENT
Start: 2022-02-28 | End: 2022-02-28

## 2022-02-28 RX ADMIN — PHENYLEPHRINE HYDROCHLORIDE 1 SPRAY: 0.25 SPRAY NASAL at 22:56

## 2022-02-28 RX ADMIN — ONDANSETRON 4 MG: 4 TABLET, ORALLY DISINTEGRATING ORAL at 22:56

## 2022-02-28 ASSESSMENT — ENCOUNTER SYMPTOMS
NAUSEA: 1
VOMITING: 0

## 2022-03-01 ENCOUNTER — APPOINTMENT (OUTPATIENT)
Dept: GENERAL RADIOLOGY | Age: 67
End: 2022-03-01
Payer: MEDICARE

## 2022-03-01 VITALS
WEIGHT: 150 LBS | TEMPERATURE: 98.2 F | OXYGEN SATURATION: 94 % | RESPIRATION RATE: 14 BRPM | HEIGHT: 60 IN | HEART RATE: 64 BPM | DIASTOLIC BLOOD PRESSURE: 84 MMHG | BODY MASS INDEX: 29.45 KG/M2 | SYSTOLIC BLOOD PRESSURE: 148 MMHG

## 2022-03-01 LAB
ABO/RH: NORMAL
ALBUMIN SERPL-MCNC: 3.8 G/DL (ref 3.5–5.2)
ALP BLD-CCNC: 62 U/L (ref 35–104)
ALT SERPL-CCNC: 10 U/L (ref 0–32)
ANION GAP SERPL CALCULATED.3IONS-SCNC: 7 MMOL/L (ref 7–16)
ANTIBODY SCREEN: NORMAL
APTT: 24.2 SEC (ref 24.5–35.1)
AST SERPL-CCNC: 21 U/L (ref 0–31)
BASOPHILS ABSOLUTE: 0.02 E9/L (ref 0–0.2)
BASOPHILS RELATIVE PERCENT: 0.4 % (ref 0–2)
BILIRUB SERPL-MCNC: <0.2 MG/DL (ref 0–1.2)
BUN BLDV-MCNC: 19 MG/DL (ref 6–23)
CALCIUM SERPL-MCNC: 9 MG/DL (ref 8.6–10.2)
CHLORIDE BLD-SCNC: 99 MMOL/L (ref 98–107)
CO2: 36 MMOL/L (ref 22–29)
CREAT SERPL-MCNC: 1.4 MG/DL (ref 0.5–1)
EKG ATRIAL RATE: 58 BPM
EKG P AXIS: 16 DEGREES
EKG P-R INTERVAL: 136 MS
EKG Q-T INTERVAL: 436 MS
EKG QRS DURATION: 74 MS
EKG QTC CALCULATION (BAZETT): 428 MS
EKG R AXIS: 12 DEGREES
EKG T AXIS: 31 DEGREES
EKG VENTRICULAR RATE: 58 BPM
EOSINOPHILS ABSOLUTE: 0.03 E9/L (ref 0.05–0.5)
EOSINOPHILS RELATIVE PERCENT: 0.5 % (ref 0–6)
GFR AFRICAN AMERICAN: 46
GFR NON-AFRICAN AMERICAN: 46 ML/MIN/1.73
GLUCOSE BLD-MCNC: 95 MG/DL (ref 74–99)
HCT VFR BLD CALC: 30.4 % (ref 34–48)
HEMOGLOBIN: 8.9 G/DL (ref 11.5–15.5)
IMMATURE GRANULOCYTES #: 0.02 E9/L
IMMATURE GRANULOCYTES %: 0.4 % (ref 0–5)
INR BLD: 1.2
LYMPHOCYTES ABSOLUTE: 0.74 E9/L (ref 1.5–4)
LYMPHOCYTES RELATIVE PERCENT: 13.2 % (ref 20–42)
MCH RBC QN AUTO: 26.2 PG (ref 26–35)
MCHC RBC AUTO-ENTMCNC: 29.3 % (ref 32–34.5)
MCV RBC AUTO: 89.4 FL (ref 80–99.9)
MONOCYTES ABSOLUTE: 0.51 E9/L (ref 0.1–0.95)
MONOCYTES RELATIVE PERCENT: 9.1 % (ref 2–12)
NEUTROPHILS ABSOLUTE: 4.29 E9/L (ref 1.8–7.3)
NEUTROPHILS RELATIVE PERCENT: 76.4 % (ref 43–80)
PDW BLD-RTO: 14.6 FL (ref 11.5–15)
PLATELET # BLD: 247 E9/L (ref 130–450)
PMV BLD AUTO: 10.7 FL (ref 7–12)
POTASSIUM SERPL-SCNC: 4.7 MMOL/L (ref 3.5–5)
PRO-BNP: 491 PG/ML (ref 0–125)
PROTHROMBIN TIME: 12.8 SEC (ref 9.3–12.4)
RBC # BLD: 3.4 E12/L (ref 3.5–5.5)
SODIUM BLD-SCNC: 142 MMOL/L (ref 132–146)
TOTAL PROTEIN: 6.5 G/DL (ref 6.4–8.3)
TROPONIN, HIGH SENSITIVITY: 16 NG/L (ref 0–9)
WBC # BLD: 5.6 E9/L (ref 4.5–11.5)

## 2022-03-01 PROCEDURE — 93010 ELECTROCARDIOGRAM REPORT: CPT | Performed by: INTERNAL MEDICINE

## 2022-03-01 PROCEDURE — 84484 ASSAY OF TROPONIN QUANT: CPT

## 2022-03-01 PROCEDURE — 85610 PROTHROMBIN TIME: CPT

## 2022-03-01 PROCEDURE — 80053 COMPREHEN METABOLIC PANEL: CPT

## 2022-03-01 PROCEDURE — 86850 RBC ANTIBODY SCREEN: CPT

## 2022-03-01 PROCEDURE — 86901 BLOOD TYPING SEROLOGIC RH(D): CPT

## 2022-03-01 PROCEDURE — 71045 X-RAY EXAM CHEST 1 VIEW: CPT

## 2022-03-01 PROCEDURE — 83880 ASSAY OF NATRIURETIC PEPTIDE: CPT

## 2022-03-01 PROCEDURE — 85730 THROMBOPLASTIN TIME PARTIAL: CPT

## 2022-03-01 PROCEDURE — 93005 ELECTROCARDIOGRAM TRACING: CPT | Performed by: EMERGENCY MEDICINE

## 2022-03-01 PROCEDURE — 85025 COMPLETE CBC W/AUTO DIFF WBC: CPT

## 2022-03-01 PROCEDURE — 6360000002 HC RX W HCPCS: Performed by: EMERGENCY MEDICINE

## 2022-03-01 PROCEDURE — 86900 BLOOD TYPING SEROLOGIC ABO: CPT

## 2022-03-01 PROCEDURE — 2500000003 HC RX 250 WO HCPCS: Performed by: EMERGENCY MEDICINE

## 2022-03-01 PROCEDURE — 36415 COLL VENOUS BLD VENIPUNCTURE: CPT

## 2022-03-01 RX ORDER — OXYCODONE HYDROCHLORIDE AND ACETAMINOPHEN 5; 325 MG/1; MG/1
2 TABLET ORAL ONCE
Status: DISCONTINUED | OUTPATIENT
Start: 2022-03-01 | End: 2022-03-01

## 2022-03-01 RX ORDER — FENTANYL CITRATE 50 UG/ML
100 INJECTION, SOLUTION INTRAMUSCULAR; INTRAVENOUS ONCE
Status: COMPLETED | OUTPATIENT
Start: 2022-03-01 | End: 2022-03-01

## 2022-03-01 RX ORDER — AMOXICILLIN AND CLAVULANATE POTASSIUM 875; 125 MG/1; MG/1
1 TABLET, FILM COATED ORAL 2 TIMES DAILY
Qty: 20 TABLET | Refills: 0 | Status: SHIPPED | OUTPATIENT
Start: 2022-03-01 | End: 2022-03-11

## 2022-03-01 RX ORDER — FENTANYL CITRATE 50 UG/ML
50 INJECTION, SOLUTION INTRAMUSCULAR; INTRAVENOUS ONCE
Status: COMPLETED | OUTPATIENT
Start: 2022-03-01 | End: 2022-03-01

## 2022-03-01 RX ADMIN — FENTANYL CITRATE 100 MCG: 50 INJECTION INTRAMUSCULAR; INTRAVENOUS at 02:22

## 2022-03-01 RX ADMIN — TRANEXAMIC ACID 1000 MG: 1 INJECTION, SOLUTION INTRAVENOUS at 01:12

## 2022-03-01 RX ADMIN — FENTANYL CITRATE 50 MCG: 50 INJECTION INTRAMUSCULAR; INTRAVENOUS at 01:10

## 2022-03-01 RX ADMIN — LIDOCAINE HYDROCHLORIDE AND EPINEPHRINE 20 ML: 10; 10 INJECTION, SOLUTION INFILTRATION; PERINEURAL at 01:13

## 2022-03-01 ASSESSMENT — PAIN SCALES - GENERAL
PAINLEVEL_OUTOF10: 8
PAINLEVEL_OUTOF10: 10
PAINLEVEL_OUTOF10: 4
PAINLEVEL_OUTOF10: 8
PAINLEVEL_OUTOF10: 8

## 2022-03-01 ASSESSMENT — ENCOUNTER SYMPTOMS
EYE PAIN: 0
WHEEZING: 0
BLOOD IN STOOL: 0
COLOR CHANGE: 0
SHORTNESS OF BREATH: 0
RHINORRHEA: 0
ABDOMINAL DISTENTION: 0
ABDOMINAL PAIN: 0
CONSTIPATION: 0
DIARRHEA: 0
CHEST TIGHTNESS: 0
BACK PAIN: 0
EYE REDNESS: 0
FACIAL SWELLING: 0
PHOTOPHOBIA: 0
TROUBLE SWALLOWING: 0
EYE ITCHING: 0

## 2022-03-01 ASSESSMENT — PAIN DESCRIPTION - LOCATION
LOCATION: NOSE
LOCATION: NOSE

## 2022-03-01 ASSESSMENT — PAIN DESCRIPTION - DESCRIPTORS
DESCRIPTORS: ACHING
DESCRIPTORS: ACHING

## 2022-03-01 ASSESSMENT — PAIN DESCRIPTION - PAIN TYPE
TYPE: ACUTE PAIN
TYPE: ACUTE PAIN

## 2022-03-01 ASSESSMENT — PAIN DESCRIPTION - PROGRESSION: CLINICAL_PROGRESSION: GRADUALLY IMPROVING

## 2022-03-01 ASSESSMENT — PAIN DESCRIPTION - ORIENTATION
ORIENTATION: LEFT
ORIENTATION: LEFT

## 2022-03-01 NOTE — ED NOTES
On home O2 3L NC, placed on NRB blow by.        Mary Stock, [facility]  02/28/22 [id number]

## 2022-03-01 NOTE — ED PROVIDER NOTES
Name: Marva Dean   MRN: [id number]     --------------------------------------------- History of Present Illness ---------------------------------------------  2/28/22, Time: 10:47 PM EST   Chief Complaint   Patient presents with    Epistaxis     onset 3 hours ago. left side epistaxis. on eliquis      HPI    Marva Dean is a 73 y.o. female, with hx of Chronic resp failure with hypoxia, chronic back pain, sarcoidosis, diabetes insipidus, chronic anemia, CHF, HTN, who presents to the ED today for nosebleed, which began 3 hours ago. She is normally on 2L NC. Pt states she did not want to come to the ED today and has had many nosebleeds before. She is on Eloquis. She has some accompanied nausea as well and has been spitting up some blood. The patient describes this as constant and moderate in severity, pt used tissue paper in left nostril which has helped, nothing makes it worse. The pt denies any associated fever, lightheadedness, dizziness, HA, n/v, chest pain, shortness of breath, abd pain, GI or  complaints. Allg: Patient has no known allergies. PCP: Marie Bernabe MD.    Meds: No current facility-administered medications for this encounter.     Current Outpatient Medications:     amoxicillin-clavulanate (AUGMENTIN) 875-125 MG per tablet, Take 1 tablet by mouth 2 times daily for 10 days, Disp: 20 tablet, Rfl: 0    predniSONE (DELTASONE) 5 MG tablet, Take 1 tablet by mouth daily, Disp: 30 tablet, Rfl: 5    albuterol (PROVENTIL) (2.5 MG/3ML) 0.083% nebulizer solution, Take 3 mLs by nebulization every 6 hours as needed for Wheezing or Shortness of Breath, Disp: 120 each, Rfl: 2    albuterol sulfate HFA (PROVENTIL HFA) 108 (90 Base) MCG/ACT inhaler, Inhale 2 puffs into the lungs every 6 hours as needed for Wheezing or Shortness of Breath, Disp: 1 each, Rfl: 5    oxyCODONE-acetaminophen (PERCOCET) 5-325 MG per tablet, Take 1 tablet by mouth every 4 hours as needed for Pain (max: 150/month) for up to 30 days. , Disp: 150 tablet, Rfl: 0    gabapentin (NEURONTIN) 300 MG capsule, Take 1 capsule by mouth at bedtime for 90 days. , Disp: 90 capsule, Rfl: 0    famotidine (PEPCID) 20 MG tablet, Take 1 tablet by mouth 2 times daily, Disp: 60 tablet, Rfl: 2    promethazine-codeine (PHENERGAN WITH CODEINE) 6.25-10 MG/5ML syrup, Take 5 mLs by mouth 4 times daily as needed for Cough for up to 60 days. , Disp: 473 mL, Rfl: 1    desmopressin (DDAVP) 0.1 MG tablet, Take 2 tablets by mouth 2 times daily, Disp: 120 tablet, Rfl: 0    ferrous sulfate (IRON 325) 325 (65 Fe) MG tablet, Take 1 tablet by mouth 2 times daily, Disp: 60 tablet, Rfl: 5    naloxone 4 MG/0.1ML LIQD nasal spray, 1 spray by Nasal route as needed for Opioid Reversal, Disp: 1 each, Rfl: 5    escitalopram (LEXAPRO) 10 MG tablet, Take 2 tablets by mouth daily (Patient taking differently: Take 10 mg by mouth 2 times daily ), Disp: 60 tablet, Rfl: 2    omega-3 acid ethyl esters (LOVAZA) 1 g capsule, Take 1 capsule by mouth 2 times daily, Disp: 60 capsule, Rfl: 2    docusate sodium (COLACE) 100 MG capsule, Take 1 capsule by mouth 2 times daily (Patient not taking: Reported on 2/10/2022), Disp: 60 capsule, Rfl: 3    apixaban (ELIQUIS) 5 MG TABS tablet, Take 1 tablet by mouth 2 times daily, Disp: 60 tablet, Rfl: 5    BREO ELLIPTA 100-25 MCG/INH AEPB inhaler, Inhale 1 puff into the lungs daily, Disp: 28 each, Rfl: 5    mupirocin (BACTROBAN) 2 % ointment, Apply topically daily, Disp: 60 g, Rfl: 2    levothyroxine (SYNTHROID) 75 MCG tablet, Take 1 tablet by mouth daily, Disp: 30 tablet, Rfl: 5    amLODIPine (NORVASC) 5 MG tablet, Take 1 tablet by mouth daily (Patient taking differently: Take 5 mg by mouth nightly ), Disp: 30 tablet, Rfl: 5    metoprolol succinate (TOPROL XL) 100 MG extended release tablet, Take 1 tablet by mouth daily, Disp: 90 tablet, Rfl: 3    metoprolol succinate (TOPROL XL) 50 MG extended release tablet, TAKE ONE TABLET EVERY DAY WITH THE 100MG, Disp: 90 tablet, Rfl: 3    Respiratory Therapy Supplies (NEBULIZER/TUBING/MOUTHPIECE) KIT, 1 kit by Does not apply route daily as needed (wheezing), Disp: 1 kit, Rfl: 2    Omega 3 1000 MG CAPS, Take 1 each by mouth daily, Disp: 90 capsule, Rfl: 1    cycloSPORINE (RESTASIS) 0.05 % ophthalmic emulsion, Place 1 drop into both eyes every 12 hours, Disp: 1 vial, Rfl: 2    hydrocortisone (ANUSOL-HC) 2.5 % CREA rectal cream, Apply to affected area BID, Disp: 1 Tube, Rfl: 5    sodium chloride (ALTAMIST SPRAY) 0.65 % nasal spray, 1 spray by Nasal route as needed for Congestion, Disp: 1 Bottle, Rfl: 3    OXYGEN, 4 L/min by Nasal route as needed. BMS, Disp: , Rfl:      Review of Systems   Constitutional: Negative for chills, fatigue and fever. HENT: Positive for nosebleeds. Negative for facial swelling, rhinorrhea and trouble swallowing. Eyes: Negative for photophobia, pain, redness and itching. Respiratory: Negative for chest tightness, shortness of breath and wheezing. Cardiovascular: Negative for chest pain and palpitations. Gastrointestinal: Positive for nausea. Negative for abdominal distention, abdominal pain, blood in stool, constipation, diarrhea and vomiting. Genitourinary: Negative for difficulty urinating, flank pain, hematuria, vaginal bleeding and vaginal discharge. Musculoskeletal: Negative for arthralgias, back pain, myalgias and neck pain. Skin: Negative for color change, rash and wound. Neurological: Negative for syncope, weakness, light-headedness and numbness. Psychiatric/Behavioral: Negative for agitation, behavioral problems and confusion. Physical Exam  Constitutional:       General: She is not in acute distress. Appearance: Normal appearance. She is normal weight. She is not ill-appearing, toxic-appearing or diaphoretic. HENT:      Head: Normocephalic and atraumatic.       Right Ear: External ear normal.      Left Ear: External ear normal.      Nose: Comments: Left nare with blood soaked tissue paper. Mouth/Throat:      Pharynx: Oropharynx is clear. Comments: Blood in posterior oropharynx  Eyes:      General:         Right eye: No discharge. Left eye: No discharge. Conjunctiva/sclera: Conjunctivae normal.      Pupils: Pupils are equal, round, and reactive to light. Cardiovascular:      Rate and Rhythm: Normal rate and regular rhythm. Pulses: Normal pulses. Pulmonary:      Effort: Pulmonary effort is normal. No respiratory distress. Breath sounds: No stridor. Rhonchi present. No wheezing. Comments: Pt normally on 3L NC  Abdominal:      General: Bowel sounds are normal. There is no distension. Palpations: Abdomen is soft. Tenderness: There is no abdominal tenderness. There is no guarding. Musculoskeletal:         General: No swelling or tenderness. Normal range of motion. Cervical back: Normal range of motion. Right lower leg: No edema. Left lower leg: No edema. Skin:     General: Skin is warm and dry. Capillary Refill: Capillary refill takes less than 2 seconds. Findings: No erythema or rash. Neurological:      General: No focal deficit present. Mental Status: She is alert and oriented to person, place, and time. Psychiatric:         Mood and Affect: Mood normal.         Behavior: Behavior normal.          Procedures     MDM  Number of Diagnoses or Management Options  Epistaxis  Diagnosis management comments: Mrs. Lenore Jonhson is a 72 y/o F pt normally on 3L NC who presents today from her nursing home for epistaxis from left nostril. She relates nausea as well. On PE, pt is alert, in no apparent distress, hacking blood into vomit bag and has tissue paper in left nostril. Lung sounds somewhat ronchorous. HRRR. /88. Labwork, EKG and CXR ordered. CXR showed bibasilar atelectasis. Chronic stable anemia w/ H/H similar to previous studies.  Clots were expelled from nostril and cotton swab soaked in TXA, afrin and lidocaine w/ epi was placed in left nostril. Swab was removed, silver nitrate was applied and rhinorocket was placed. Procedure was well tolerated and hemostasis achieved. Fentanyl given for pain. Pt was observed for over an hour and had no further bleeding and oropharynx was clear. Spoke with pt about f/u with ENT and pt was given prescription for augmentin. Return precautions given. Pt was amenable to plan. Pt was discharged back to her nursing facility. EKG Interpretation  Interpreted by emergency department physician. 3/1/22  Time: 0046    Rate: 58  Axis: normal  MO: 136  QRS: 74  Qtc: 428  Rhythm:  regular  Clinical Impression: sinus bradycardia  Comparison to old EKG: PACs no longer present when compared to 2/19/22        ED Course as of 03/01/22 0844   Mon Feb 28, 2022   2314 Afrin and TXA topical ordered. [PW]   4696 Pt blew out large clot and Cotton ball with TXA, afrin and lidocaine with epi placed in nose. [PW]   Tue Mar 01, 2022   0001 Rhinorocket was applied to left nostril. [PW]   0002 PROCEDURE NOTE  3/1/22       Time: 1200    NASAL PACKING / CAUTERY  Risks, benefits and alternatives (for applicable procedures below) described. Performed By: EM Attending Physician and EM Resident. Indication:   Left anterior/posterior epistaxis. Informed consent: Verbal consent obtained. The patient was counseled regarding the procedure in person, it's indications, risks, potential complications and alternatives and any questions were answered. Verbal consent was obtained. Procedure:  Left nares cauterized with silver nitrate and tamponaded with an anterior/ posterior rapid Rhino nasal pack . [facility] Lat Hemostatsis  obtained. Patient tolerated the procedure with difficulty. [ME]   0126 EKG: This EKG is signed and interpreted by me.     Rate: 58  Rhythm: Sinus  Interpretation: Sinus rhythm sinus bradycardia, MO is 136, QRS is 74, QTc is 428, nonspecific ST changes with motion artifact, appears stable compared to 2/19/2022  Comparison: stable as compared to patient's most recent EKG   [ME]      ED Course User Index  [ME] Candelario Hartmann DO  [PW] Niyah DO Renate          --------------------------------------------- PAST HISTORY ---------------------------------------------  Past Medical History:  has a past medical history of A-fib ([address] Able to transfer from chair to wheelchair, Abnormal mammogram, Acute on chronic respiratory failure ([address] Anemia, Anemia due to chronic illness, Ankle fracture, left, 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[address] Backache, Benign hypertension, CHF (congestive heart failure) ([address] Chronic back pain, Chronic kidney disease, Chronic pain disorder, Chronic, continuous use of opioids, Compression fracture of thoracic vertebra ([address] COPD (chronic obstructive pulmonary disease) ([address] Debility, Deformity of ankle and foot, acquired, Depression, Diabetes insipidus ([address] Diverticulitis, Dry eyes, Encephalopathy acute, Gait disturbance, GERD (gastroesophageal reflux disease), Hemorrhoids, Hernia, History of blood transfusion, History of Clostridium difficile, Hyperlipidemia, Hypothyroidism, Ischemic colitis, enteritis, or enterocolitis ([address] Long-term current use of steroids, Lumbar disc herniation, Myalgia and myositis, unspecified, Nephrosclerosis, Nonunion of fracture, Osteoarthritis, PAF (paroxysmal atrial fibrillation) ([address] 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[address] Pulmonary hypertension ([address] Pulmonary sarcoidosis ([address] Rectal bleeding, Rhabdomyolysis, Steroid-induced avascular necrosis of shoulder ([address] and Tibia fracture. Past Surgical History:  has a past surgical history that includes fracture surgery (2010); Kyphosis surgery; Lumbar disc surgery; Tibia / fibia lengthening; other surgical history (11/1/11); Abdomen surgery (2008); Echo Complete (4/22/2013); ECHO Compl W Dop Color Flow (8/16/2015);  Upper gastrointestinal endoscopy (1/4/2016); Hemorrhoid surgery (12/08/2016); Colonoscopy (2008); Colonoscopy (04/11/2014); Colonoscopy (11/23/2015); Colonoscopy (10/24/2016); Colonoscopy (07/26/2017); Upper gastrointestinal endoscopy (07/26/2017); sigmoidoscopy (N/A, 3/19/2021); and other surgical history (12/14/2021). Social History:  reports that she quit smoking about 25 years ago. Her smoking use included cigarettes. She started smoking about 51 years ago. She has a 18.75 pack-year smoking history. She has never used smokeless tobacco. She reports current drug use. She reports that she does not drink alcohol. Family History: family history includes Alcohol Abuse in her sister; Arthritis in her father and mother; Diabetes in her father and mother; High Blood Pressure in her father, mother, and sister; Substance Abuse in her brother and sister. The patients home medications have been reviewed. Allergies: Patient has no known allergies.     -------------------------------------------------- RESULTS -------------------------------------------------  Labs:  Results for orders placed or performed during the hospital encounter of 02/28/22   Troponin   Result Value Ref Range    Troponin, High Sensitivity 16 (H) 0 - 9 ng/L   CBC with Auto Differential   Result Value Ref Range    WBC 5.6 4.5 - 11.5 E9/L    RBC 3.40 (L) 3.50 - 5.50 E12/L    Hemoglobin 8.9 (L) 11.5 - 15.5 g/dL    Hematocrit 30.4 (L) 34.0 - 48.0 %    MCV 89.4 80.0 - 99.9 fL    MCH 26.2 26.0 - 35.0 pg    MCHC 29.3 (L) 32.0 - 34.5 %    RDW 14.6 11.5 - 15.0 fL    Platelets 468 165 - 692 E9/L    MPV 10.7 7.0 - 12.0 fL    Neutrophils % 76.4 43.0 - 80.0 %    Immature Granulocytes % 0.4 0.0 - 5.0 %    Lymphocytes % 13.2 (L) 20.0 - 42.0 %    Monocytes % 9.1 2.0 - 12.0 %    Eosinophils % 0.5 0.0 - 6.0 %    Basophils % 0.4 0.0 - 2.0 %    Neutrophils Absolute 4.29 1.80 - 7.30 E9/L    Immature Granulocytes # 0.02 E9/L    Lymphocytes Absolute 0.74 (L) 1.50 - 4.00 E9/L    Monocytes Absolute 0.51 0.10 - 0.95 E9/L    Eosinophils Absolute 0.03 (L) 0.05 - 0.50 E9/L    Basophils Absolute 0.02 0.00 - 0.20 E9/L   Comprehensive Metabolic Panel   Result Value Ref Range    Sodium 142 132 - 146 mmol/L    Potassium 4.7 3.5 - 5.0 mmol/L    Chloride 99 98 - 107 mmol/L    CO2 36 (H) 22 - 29 mmol/L    Anion Gap 7 7 - 16 mmol/L    Glucose 95 74 - 99 mg/dL    BUN 19 6 - 23 mg/dL    CREATININE 1.4 (H) 0.5 - 1.0 mg/dL    GFR Non-African American 46 >=60 mL/min/1.73    GFR African American 46     Calcium 9.0 8.6 - 10.2 mg/dL    Total Protein 6.5 6.4 - 8.3 g/dL    Albumin 3.8 3.5 - 5.2 g/dL    Total Bilirubin <0.2 0.0 - 1.2 mg/dL    Alkaline Phosphatase 62 35 - 104 U/L    ALT 10 0 - 32 U/L    AST 21 0 - 31 U/L   Protime-INR   Result Value Ref Range    Protime 12.8 (H) 9.3 - 12.4 sec    INR 1.2    APTT   Result Value Ref Range    aPTT 24.2 (L) 24.5 - 35.1 sec   Brain Natriuretic Peptide   Result Value Ref Range    Pro- (H) 0 - 125 pg/mL   EKG 12 Lead   Result Value Ref Range    Ventricular Rate 58 BPM    Atrial Rate 58 BPM    P-R Interval 136 ms    QRS Duration 74 ms    Q-T Interval 436 ms    QTc Calculation (Bazett) 428 ms    P Axis 16 degrees    R Axis 12 degrees    T Axis 31 degrees   TYPE AND SCREEN   Result Value Ref Range    ABO/Rh O POS     Antibody Screen NEG        Radiology:  XR CHEST PORTABLE   Final Result   Mild basilar densities suggestive of atelectasis. RECOMMENDATION:   PA/lateral exam or CT could further evaluate.             ------------------------- NURSING NOTES AND VITALS REVIEWED ---------------------------  Date / Time Roomed:  2/28/2022 10:32 PM  ED Bed Assignment:  23/23    The nursing notes within the ED encounter and vital signs as below have been reviewed.    BP (!) 148/84   Pulse 64   Temp 98.2 °F (36.8 °C) (Oral)   Resp 14   Ht 5' (1.524 m)   Wt 150 lb (68 kg)   SpO2 94%   BMI 29.29 kg/m²   Oxygen Saturation Interpretation: normal on her home oxygen ------------------------------------------ PROGRESS NOTES ------------------------------------------  8:40 AM EST  I have spoken with the patient and discussed todays results, in addition to providing specific details for the plan of care and counseling regarding the diagnosis and prognosis. Their questions are answered at this time and they are agreeable with the plan. I discussed at length with them reasons for immediate return here for re evaluation. They will followup with their PCP by calling their office Monday and were instructed to return to the ED for any new or worsening symptoms. --------------------------------- ADDITIONAL PROVIDER NOTES ---------------------------------  At this time the patient is without objective evidence of an acute process requiring hospitalization or inpatient management. They have remained hemodynamically stable throughout their entire ED visit and are stable for discharge with outpatient follow-up. The plan has been discussed in detail and they are aware of the specific conditions for emergent return, as well as the importance of follow-up. Discharge Medication List as of 3/1/2022  3:02 AM      START taking these medications    Details   amoxicillin-clavulanate (AUGMENTIN) 875-125 MG per tablet Take 1 tablet by mouth 2 times daily for 10 days, Disp-20 tablet, R-0Print             Diagnosis:  1. Epistaxis        Disposition:  Patient's disposition: Discharge to nursing facility  Patient's condition is stable.     DO Debra Martinez DO  Resident  03/01/22 [id number]

## 2022-03-08 DIAGNOSIS — G89.29 CHRONIC BILATERAL LOW BACK PAIN WITHOUT SCIATICA: ICD-10-CM

## 2022-03-08 DIAGNOSIS — F11.90 CHRONIC, CONTINUOUS USE OF OPIOIDS: ICD-10-CM

## 2022-03-08 DIAGNOSIS — M54.50 CHRONIC BILATERAL LOW BACK PAIN WITHOUT SCIATICA: ICD-10-CM

## 2022-03-08 DIAGNOSIS — J44.9 COPD, MODERATE (HCC): ICD-10-CM

## 2022-03-08 NOTE — TELEPHONE ENCOUNTER
Last Appointment:  2/10/2022  Future Appointments   Date Time Provider Mauri Sanford   3/14/2022  3:10 PM Mandeep Alfonso MD BD [facility] SURG [facility]   3/21/2022  3:00 PM [facility]   6/30/2022  2:30 PM Michelle Rosales MD [address] [place] [address]

## 2022-03-09 RX ORDER — OXYCODONE HYDROCHLORIDE AND ACETAMINOPHEN 5; 325 MG/1; MG/1
1 TABLET ORAL EVERY 4 HOURS PRN
Qty: 150 TABLET | Refills: 0 | Status: SHIPPED
Start: 2022-03-09 | End: 2022-04-07 | Stop reason: SDUPTHER

## 2022-03-09 RX ORDER — OMEGA-3 FATTY ACIDS/FISH OIL 300-1000MG
1 CAPSULE ORAL DAILY
Qty: 90 CAPSULE | Refills: 1 | Status: SHIPPED
Start: 2022-03-09 | End: 2022-09-12

## 2022-03-09 RX ORDER — PROMETHAZINE HYDROCHLORIDE AND CODEINE PHOSPHATE 6.25; 1 MG/5ML; MG/5ML
5 SYRUP ORAL 4 TIMES DAILY PRN
Qty: 473 ML | Refills: 1 | Status: SHIPPED
Start: 2022-03-09 | End: 2022-04-27 | Stop reason: SDUPTHER

## 2022-03-09 RX ORDER — ESCITALOPRAM OXALATE 10 MG/1
10 TABLET ORAL 2 TIMES DAILY
Qty: 60 TABLET | Refills: 2 | Status: SHIPPED
Start: 2022-03-09 | End: 2022-06-10 | Stop reason: SDUPTHER

## 2022-03-11 ENCOUNTER — APPOINTMENT (OUTPATIENT)
Dept: CT IMAGING | Age: 67
End: 2022-03-11
Payer: MEDICARE

## 2022-03-11 ENCOUNTER — APPOINTMENT (OUTPATIENT)
Dept: GENERAL RADIOLOGY | Age: 67
End: 2022-03-11
Payer: MEDICARE

## 2022-03-11 ENCOUNTER — HOSPITAL ENCOUNTER (EMERGENCY)
Age: 67
Discharge: HOME | End: 2022-03-11
Attending: EMERGENCY MEDICINE
Payer: MEDICARE

## 2022-03-11 ENCOUNTER — TELEPHONE (OUTPATIENT)
Dept: ENT CLINIC | Age: 67
End: 2022-03-11

## 2022-03-11 VITALS
RESPIRATION RATE: 20 BRPM | TEMPERATURE: 98.2 F | DIASTOLIC BLOOD PRESSURE: 88 MMHG | HEART RATE: 64 BPM | SYSTOLIC BLOOD PRESSURE: 131 MMHG | OXYGEN SATURATION: 100 %

## 2022-03-11 DIAGNOSIS — R04.0 EPISTAXIS: Primary | ICD-10-CM

## 2022-03-11 DIAGNOSIS — I27.21 PULMONARY ARTERIAL HYPERTENSION (HCC): ICD-10-CM

## 2022-03-11 LAB
ALBUMIN SERPL-MCNC: 3.6 G/DL (ref 3.5–5.2)
ALP BLD-CCNC: 60 U/L (ref 35–104)
ALT SERPL-CCNC: 8 U/L (ref 0–32)
ANION GAP SERPL CALCULATED.3IONS-SCNC: 8 MMOL/L (ref 7–16)
APTT: 24.7 SEC (ref 24.5–35.1)
AST SERPL-CCNC: 22 U/L (ref 0–31)
BASOPHILS ABSOLUTE: 0.01 E9/L (ref 0–0.2)
BASOPHILS RELATIVE PERCENT: 0.2 % (ref 0–2)
BILIRUB SERPL-MCNC: 0.3 MG/DL (ref 0–1.2)
BUN BLDV-MCNC: 18 MG/DL (ref 6–23)
CALCIUM SERPL-MCNC: 9.2 MG/DL (ref 8.6–10.2)
CHLORIDE BLD-SCNC: 101 MMOL/L (ref 98–107)
CO2: 36 MMOL/L (ref 22–29)
CREAT SERPL-MCNC: 1.2 MG/DL (ref 0.5–1)
D DIMER: 474 NG/ML DDU
EKG ATRIAL RATE: 59 BPM
EKG P AXIS: 59 DEGREES
EKG P-R INTERVAL: 134 MS
EKG Q-T INTERVAL: 462 MS
EKG QRS DURATION: 78 MS
EKG QTC CALCULATION (BAZETT): 457 MS
EKG R AXIS: 67 DEGREES
EKG T AXIS: 54 DEGREES
EKG VENTRICULAR RATE: 59 BPM
EOSINOPHILS ABSOLUTE: 0.22 E9/L (ref 0.05–0.5)
EOSINOPHILS RELATIVE PERCENT: 4 % (ref 0–6)
GFR AFRICAN AMERICAN: 55
GFR NON-AFRICAN AMERICAN: 55 ML/MIN/1.73
GLUCOSE BLD-MCNC: 84 MG/DL (ref 74–99)
HCT VFR BLD CALC: 31.6 % (ref 34–48)
HEMOGLOBIN: 9.1 G/DL (ref 11.5–15.5)
HYPOCHROMIA: ABNORMAL
IMMATURE GRANULOCYTES #: 0.01 E9/L
IMMATURE GRANULOCYTES %: 0.2 % (ref 0–5)
INR BLD: 1
LYMPHOCYTES ABSOLUTE: 0.5 E9/L (ref 1.5–4)
LYMPHOCYTES RELATIVE PERCENT: 9.2 % (ref 20–42)
MCH RBC QN AUTO: 25.5 PG (ref 26–35)
MCHC RBC AUTO-ENTMCNC: 28.8 % (ref 32–34.5)
MCV RBC AUTO: 88.5 FL (ref 80–99.9)
MONOCYTES ABSOLUTE: 0.47 E9/L (ref 0.1–0.95)
MONOCYTES RELATIVE PERCENT: 8.6 % (ref 2–12)
NEUTROPHILS ABSOLUTE: 4.24 E9/L (ref 1.8–7.3)
NEUTROPHILS RELATIVE PERCENT: 77.8 % (ref 43–80)
OVALOCYTES: ABNORMAL
PDW BLD-RTO: 14.8 FL (ref 11.5–15)
PLATELET # BLD: 176 E9/L (ref 130–450)
PMV BLD AUTO: 10.9 FL (ref 7–12)
POIKILOCYTES: ABNORMAL
POLYCHROMASIA: ABNORMAL
POTASSIUM REFLEX MAGNESIUM: 3.7 MMOL/L (ref 3.5–5)
PROTHROMBIN TIME: 11 SEC (ref 9.3–12.4)
RBC # BLD: 3.57 E12/L (ref 3.5–5.5)
SCHISTOCYTES: ABNORMAL
SODIUM BLD-SCNC: 145 MMOL/L (ref 132–146)
TOTAL PROTEIN: 6.4 G/DL (ref 6.4–8.3)
TROPONIN, HIGH SENSITIVITY: 15 NG/L (ref 0–9)
TROPONIN, HIGH SENSITIVITY: 15 NG/L (ref 0–9)
WBC # BLD: 5.5 E9/L (ref 4.5–11.5)

## 2022-03-11 PROCEDURE — 85730 THROMBOPLASTIN TIME PARTIAL: CPT

## 2022-03-11 PROCEDURE — 71275 CT ANGIOGRAPHY CHEST: CPT

## 2022-03-11 PROCEDURE — 71045 X-RAY EXAM CHEST 1 VIEW: CPT

## 2022-03-11 PROCEDURE — 99283 EMERGENCY DEPT VISIT LOW MDM: CPT

## 2022-03-11 PROCEDURE — 85378 FIBRIN DEGRADE SEMIQUANT: CPT

## 2022-03-11 PROCEDURE — 6360000004 HC RX CONTRAST MEDICATION: Performed by: RADIOLOGY

## 2022-03-11 PROCEDURE — 80053 COMPREHEN METABOLIC PANEL: CPT

## 2022-03-11 PROCEDURE — 93005 ELECTROCARDIOGRAM TRACING: CPT

## 2022-03-11 PROCEDURE — 84484 ASSAY OF TROPONIN QUANT: CPT

## 2022-03-11 PROCEDURE — 99283 EMERGENCY DEPT VISIT LOW MDM: CPT | Performed by: OTOLARYNGOLOGY

## 2022-03-11 PROCEDURE — 85025 COMPLETE CBC W/AUTO DIFF WBC: CPT

## 2022-03-11 PROCEDURE — 85610 PROTHROMBIN TIME: CPT

## 2022-03-11 RX ADMIN — IOPAMIDOL 75 ML: 755 INJECTION, SOLUTION INTRAVENOUS at 15:56

## 2022-03-11 ASSESSMENT — ENCOUNTER SYMPTOMS
VOMITING: 0
EYE DISCHARGE: 0
STRIDOR: 0
CHEST TIGHTNESS: 0
RHINORRHEA: 0
SHORTNESS OF BREATH: 0
CONSTIPATION: 0
ABDOMINAL PAIN: 0
DIARRHEA: 0
BACK PAIN: 0
ALLERGIC/IMMUNOLOGIC NEGATIVE: 1
WHEEZING: 0
COUGH: 0
NAUSEA: 0
COLOR CHANGE: 0
EYE REDNESS: 0
EYE ITCHING: 0
SORE THROAT: 0

## 2022-03-11 NOTE — TELEPHONE ENCOUNTER
----- Message from Cameron Valero MA sent at 3/11/2022  1:55 PM EST -----  Regarding: ed f/u epistaxis  Per samara-- this pt was seen 3/11 in ED for epistaxis and will need a follow up next week w/ dr Devan Joel

## 2022-03-11 NOTE — ED PROVIDER NOTES
Jimmie Osorio is a 73 y.o. female    Chief Complaint   Patient presents with    Epistaxis     SOB for 10 yrs on 4 liters at home, CP started 3 days ago per patient, Epistaxis started 30 mins ago. RADHA Osorio is a 73 y.o. female presenting to the ED for Epistaxis (SOB for 10 yrs on 4 liters at home, CP started 3 days ago per patient, Epistaxis started 30 mins ago. )    History comes primarily from the patient. Patient presents today with nosebleed of the left nostril for the last 30 minutes. Patient was seen nearly 2 weeks ago for the same thing and after cautery with silver nitrate and Rhino Rocket hemostasis and achieved. Follow-up with ENT. The patient has a small note from the ENT doctor saying she should come here for embolization. She thinks the doctor was Dr. Fawn Melendez. Patient also complains of mild left-sided chest pain which she say has been going on for \" a while,\" or for months, and she has not taken her blood thinner medication for at least a couple of weeks, for which she takes it for A. fib. She is not having any increasing shortness of breath and has COPD on 4 L of oxygen at home. Upon entering the room, the patient is on a 15 L nonrebreather. However she tolerates 4 L just fine during my exam and history. Is noted to have atrial fibrillation and supposed to be taking Eliquis. She also has chronic anemia, CHF, CKD, chronic respiratory failure/chronic bronchitis      Review of Systems   Constitutional: Negative for activity change, appetite change, fatigue and fever. HENT: Positive for nosebleeds. Negative for congestion, rhinorrhea and sore throat. Eyes: Negative for redness and itching. Respiratory: Negative for chest tightness, shortness of breath and wheezing. Cardiovascular: Positive for chest pain. Negative for palpitations. Gastrointestinal: Negative for abdominal pain, constipation, diarrhea, nausea and vomiting.    Genitourinary: Negative for difficulty urinating, frequency and urgency. Musculoskeletal: Negative for arthralgias, back pain and myalgias. Skin: Negative for pallor and rash. Neurological: Negative for dizziness, weakness, numbness and headaches. Psychiatric/Behavioral: Negative for agitation, behavioral problems, confusion and decreased concentration. All other systems reviewed and are negative. Physical Exam  Vitals and nursing note reviewed. Constitutional:       General: She is not in acute distress. Appearance: Normal appearance. She is normal weight. She is not ill-appearing or toxic-appearing. HENT:      Head: Normocephalic and atraumatic. Right Ear: External ear normal.      Left Ear: External ear normal.      Nose: No nasal deformity, septal deviation, signs of injury, laceration, congestion or rhinorrhea. Right Nostril: No foreign body, epistaxis or septal hematoma. Left Nostril: Epistaxis present. No foreign body or septal hematoma. Mouth/Throat:      Mouth: Mucous membranes are moist.      Pharynx: No oropharyngeal exudate. Comments: Small amount of blood in oropharynx  Eyes:      General: No scleral icterus. Extraocular Movements: Extraocular movements intact. Conjunctiva/sclera: Conjunctivae normal.      Pupils: Pupils are equal, round, and reactive to light. Cardiovascular:      Rate and Rhythm: Normal rate and regular rhythm. Pulses: Normal pulses. Heart sounds: Normal heart sounds. No murmur heard. Pulmonary:      Effort: Pulmonary effort is normal. No respiratory distress. Breath sounds: Normal breath sounds. No wheezing. Abdominal:      General: Bowel sounds are normal. There is no distension. Palpations: Abdomen is soft. Tenderness: There is no abdominal tenderness. Musculoskeletal:         General: No swelling or deformity. Normal range of motion. Cervical back: Normal range of motion and neck supple. No rigidity.    Skin: General: Skin is warm and dry. Capillary Refill: Capillary refill takes less than 2 seconds. Coloration: Skin is not jaundiced or pale. Neurological:      General: No focal deficit present. Mental Status: She is alert and oriented to person, place, and time. Mental status is at baseline. Cranial Nerves: No cranial nerve deficit. Sensory: No sensory deficit. Motor: No weakness. Psychiatric:         Mood and Affect: Mood normal.         Behavior: Behavior normal.         Thought Content: Thought content normal.         Judgment: Judgment normal.          Procedures     MDM   Patient presented to the Emergency Department for Epistaxis (SOB for 10 yrs on 4 liters at home, CP started 3 days ago per patient, Epistaxis started 30 mins ago. )    They are clinically stable, vital signs stable, non toxic appearing. ENT is consulted for this patient's nosebleed. ENT normal use senna foam in left naris. Patient refused Rhino Rocket. Patient will use nasal saline and use her oxygen in her mouth other than her nose. She will follow-up with Dr. Junnie Hashimoto next week. It was discussed with the patient that if she starts bleeding again, she will need to return to the emergency room for [facility] and likely embolization. Patient's laboratories reviewed. She does have an anemia but it is at baseline levels. Imaging shows cardiomegaly with pulmonary hypertension, which is chronic. CTA obtained due to the fact that the patient has not been taking her blood thinner and her dimer was elevated. However no evidence of pulmonary embolism. Findings are discussed with the patient and ENT he spends an extensive amount of time with her. Her nosebleeds ultimately is stopped here in the emergency room. She will be discharged home at this time. Strict return precautions were discussed including but not limited too new or worsening symtpoms.  They verbalized understanding and were agreeable with the plan. All questions were answered and patient was discharged. EKG: This EKG is signed and interpreted by me. Rate: 59  Rhythm: sinus  Axis: normal  Interpretation: no acute changes  Comparison: stable as compared to patient's most recent EKG      ED Course as of 03/11/22 2023   Fri Mar 11, 2022   1103 I spoke with the ENT resident Dr. Alice Joseph. He stated he got in touch with Dr. Latrell Elizabeth. They have not seen the patient in over a year. He suggested treating the patient with any other nosebleed and seeing if the patient may be saw Dr. Ryan Johnson or Shiva Astudillo who are not on [facility]. [[place]]   [facility] with the ENT resident. He evaluated the patient. There is no current active bleeding. Patient will follow up with Dr. Nirav Ross from ENT from here. The patient does not desire an embolization at this time, however if she does begin having epistaxis again she will require a Rhino Rocket and embolization.  [[place]]      ED Course User Index  [KS] Tabatha Wong MD       --------------------------------------------- PAST HISTORY ---------------------------------------------  Past Medical History:  has a past medical history of A-fib ([address] Able to transfer from chair to wheelchair, Abnormal mammogram, Acute on chronic respiratory failure ([address] Anemia, Anemia due to chronic illness, Ankle fracture, left, 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[address] Backache, Benign hypertension, CHF (congestive heart failure) ([address] Chronic back pain, Chronic kidney disease, Chronic pain disorder, Chronic, continuous use of opioids, Compression fracture of thoracic vertebra ([address] COPD (chronic obstructive pulmonary disease) ([address] Debility, Deformity of ankle and foot, acquired, Depression, Diabetes insipidus ([address] Diverticulitis, Dry eyes, Encephalopathy acute, Gait disturbance, GERD (gastroesophageal reflux disease), Hemorrhoids, Hernia, History of blood transfusion, History of Clostridium difficile, Hyperlipidemia, Hypothyroidism, Ischemic colitis, enteritis, or enterocolitis ([address] Long-term current use of steroids, Lumbar disc herniation, Myalgia and myositis, unspecified, Nephrosclerosis, Nonunion of fracture, Osteoarthritis, PAF (paroxysmal atrial fibrillation) ([address] 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[address] Pulmonary hypertension ([address] Pulmonary sarcoidosis ([address] Rectal bleeding, Rhabdomyolysis, Steroid-induced avascular necrosis of shoulder ([address] and Tibia fracture. Past Surgical History:  has a past surgical history that includes fracture surgery (2010); Kyphosis surgery; Lumbar disc surgery; Tibia / fibia lengthening; other surgical history (11/1/11); Abdomen surgery (2008); Echo Complete (4/22/2013); ECHO Compl W Dop Color Flow (8/16/2015); Upper gastrointestinal endoscopy (1/4/2016); Hemorrhoid surgery (12/08/2016); Colonoscopy (2008); Colonoscopy (04/11/2014); Colonoscopy (11/23/2015); Colonoscopy (10/24/2016); Colonoscopy (07/26/2017); Upper gastrointestinal endoscopy (07/26/2017); sigmoidoscopy (N/A, 3/19/2021); and other surgical history (12/14/2021). Social History:  reports that she quit smoking about 25 years ago. Her smoking use included cigarettes. She started smoking about 51 years ago. She has a 18.75 pack-year smoking history. She has never used smokeless tobacco. She reports current drug use. She reports that she does not drink alcohol. Family History: family history includes Alcohol Abuse in her sister; Arthritis in her father and mother; Diabetes in her father and mother; High Blood Pressure in her father, mother, and sister; Substance Abuse in her brother and sister. The patients home medications have been reviewed. Allergies: Patient has no known allergies.     -------------------------------------------------- RESULTS -------------------------------------------------  Labs:  Results for orders placed or performed during the hospital encounter of 03/11/22   CBC with Auto Differential Result Value Ref Range    WBC 5.5 4.5 - 11.5 E9/L    RBC 3.57 3.50 - 5.50 E12/L    Hemoglobin 9.1 (L) 11.5 - 15.5 g/dL    Hematocrit 31.6 (L) 34.0 - 48.0 %    MCV 88.5 80.0 - 99.9 fL    MCH 25.5 (L) 26.0 - 35.0 pg    MCHC 28.8 (L) 32.0 - 34.5 %    RDW 14.8 11.5 - 15.0 fL    Platelets 530 060 - 592 E9/L    MPV 10.9 7.0 - 12.0 fL    Neutrophils % 77.8 43.0 - 80.0 %    Immature Granulocytes % 0.2 0.0 - 5.0 %    Lymphocytes % 9.2 (L) 20.0 - 42.0 %    Monocytes % 8.6 2.0 - 12.0 %    Eosinophils % 4.0 0.0 - 6.0 %    Basophils % 0.2 0.0 - 2.0 %    Neutrophils Absolute 4.24 1.80 - 7.30 E9/L    Immature Granulocytes # 0.01 E9/L    Lymphocytes Absolute 0.50 (L) 1.50 - 4.00 E9/L    Monocytes Absolute 0.47 0.10 - 0.95 E9/L    Eosinophils Absolute 0.22 0.05 - 0.50 E9/L    Basophils Absolute 0.01 0.00 - 0.20 E9/L    Polychromasia 1+     Hypochromia 1+     Poikilocytes 1+     Schistocytes 1+     Ovalocytes 1+    Troponin   Result Value Ref Range    Troponin, High Sensitivity 15 (H) 0 - 9 ng/L   Comprehensive Metabolic Panel w/ Reflex to MG   Result Value Ref Range    Sodium 145 132 - 146 mmol/L    Potassium reflex Magnesium 3.7 3.5 - 5.0 mmol/L    Chloride 101 98 - 107 mmol/L    CO2 36 (H) 22 - 29 mmol/L    Anion Gap 8 7 - 16 mmol/L    Glucose 84 74 - 99 mg/dL    BUN 18 6 - 23 mg/dL    CREATININE 1.2 (H) 0.5 - 1.0 mg/dL    GFR Non-African American 55 >=60 mL/min/1.73    GFR African American 55     Calcium 9.2 8.6 - 10.2 mg/dL    Total Protein 6.4 6.4 - 8.3 g/dL    Albumin 3.6 3.5 - 5.2 g/dL    Total Bilirubin 0.3 0.0 - 1.2 mg/dL    Alkaline Phosphatase 60 35 - 104 U/L    ALT 8 0 - 32 U/L    AST 22 0 - 31 U/L   D-Dimer, Quantitative   Result Value Ref Range    D-Dimer, Quant 474 ng/mL DDU   APTT   Result Value Ref Range    aPTT 24.7 24.5 - 35.1 sec   Protime-INR   Result Value Ref Range    Protime 11.0 9.3 - 12.4 sec    INR 1.0    Troponin   Result Value Ref Range    Troponin, High Sensitivity 15 (H) 0 - 9 ng/L   EKG 12 Lead objective evidence of an acute process requiring hospitalization or inpatient management. They have remained hemodynamically stable throughout their entire ED visit and are stable for discharge with outpatient follow-up. The plan has been discussed in detail and they are aware of the specific conditions for emergent return, as well as the importance of follow-up. Discharge Medication List as of 3/11/2022  5:07 PM          Diagnosis:  1. Epistaxis    2. Pulmonary arterial hypertension ([facility] [address]        Disposition:  Patient's disposition: Discharge to home  Patient's condition is stable. Kimi Eastman MD  Resident  03/11/22 2023      ATTENDING PROVIDER ATTESTATION:     Marva Dean presented to the emergency department for evaluation of Epistaxis (SOB for 10 yrs on 4 liters at home, CP started 3 days ago per patient, Epistaxis started 30 mins ago. )   and was initially evaluated by the Medical Resident. See Original ED Note for H&P and ED course above. I have reviewed and discussed the case, including pertinent history (medical, surgical, family and social) and exam findings with the Medical Resident assigned to Dedale Lopezangelica. I have personally performed and/or participated in the history, exam, medical decision making, and procedures and agree with all pertinent clinical information. I have reviewed my findings and recommendations with the assigned Medical Resident, Marva Dean and members of family present at the time of disposition. My findings/plan: The primary encounter diagnosis was Epistaxis. A diagnosis of Pulmonary arterial hypertension (HCC) was also pertinent to this visit.   Discharge Medication List as of 3/11/2022  5:07 PM        MD Aleksandr Packer MD  03/13/22 [id number]

## 2022-03-11 NOTE — CONSULTS
OTOLARYNGOLOGY  CONSULT NOTE  3/11/2022    Physician Consulted: Dr. Almita Mullen  Reason for Consult: intermittent epistaxis   Referring Physician: Dr. Chuyita Banerjee    HPI  Marcelino Noel is a 73 y.o. female who ENT was consulted for evaluation of epistaxis. Pt states early today she had some oozing from her left naris that has since stopped. She was seen by Dr. Philippe Álvarez with reported cautery about a week ago, she states she was packed with a rhino rocket about 1.5 weeks ago on the left along with cautery in the ED. Pt on home O2, 3L via nasal cannula. She is on Eliquis for a fib but has not taken it recently. Hb 9.1. History of COPD, sarcoid, a fib. Review of Systems   Constitutional: Negative for chills, fatigue and fever. HENT: Positive for nosebleeds. Eyes: Negative for discharge and redness. Respiratory: Negative for cough, shortness of breath and stridor. Gastrointestinal: Negative for nausea and vomiting. Endocrine: Negative. Genitourinary: Negative. Musculoskeletal: Negative. Skin: Negative for color change and rash. Allergic/Immunologic: Negative. Neurological: Negative for dizziness, speech difficulty and headaches. Hematological: Negative. Psychiatric/Behavioral: Negative for agitation and confusion. All other systems reviewed and are negative.       Past Medical History:   Diagnosis Date    A-fib [facility])     follows with Dr Carmela Todd to transfer from chair to wheelchair     with assistance    Abnormal mammogram 2010    Acute on chronic respiratory failure ([facility] [address] 05/05/2017    Anemia     Anemia due to chronic illness     Ankle fracture, left 2008    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) 03/26/2007    Backache     Benign hypertension     CHF (congestive heart failure) (HCC)     Chronic back pain     Chronic kidney disease     stage 3    Chronic pain disorder     Chronic, continuous use of opioids     Compression fracture of thoracic vertebra (HCC)     T10  COPD (chronic obstructive pulmonary disease) ([facility] [address]     Debility     Deformity of ankle and foot, acquired 01/31/2011    Depression     Diabetes insipidus ([address]     Diverticulitis     Dry eyes     Encephalopathy acute 05/05/2017    Gait disturbance     GERD (gastroesophageal reflux disease)     Hemorrhoids 01/13/2012    Hernia     History of blood transfusion approx 05/2017    History of Clostridium difficile     negative culture 12-15-15; resolved    Hyperlipidemia     Hypothyroidism     Ischemic colitis, enteritis, or enterocolitis ([address]     Long-term current use of steroids     Lumbar disc herniation     Myalgia and myositis, unspecified     Nephrosclerosis     Nonunion of fracture 02/22/2011    Osteoarthritis     severe    [facility]ox[facility]     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HCC)     PCWP mean:26.0 PA mean: 24.00; denies any recent issues    Pulmonary hypertension (HCC)     ventricular diastolic function consistent with abnormal relaxation (stage I)    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, f/u w/ PCP    Rectal bleeding     Rhabdomyolysis 05/09/2011    Steroid-induced avascular necrosis of shoulder ([facility] [address]     Right    Tibia fracture 07/2010       Past Surgical History:   Procedure Laterality Date    ABDOMEN SURGERY  2008    ischemic colitis    COLONOSCOPY  2008    healed colitis    COLONOSCOPY  04/11/2014    COLONOSCOPY  11/23/2015    severe colitis    COLONOSCOPY  10/24/2016    COLONOSCOPY  07/26/2017    ECHO COMPL W DOP COLOR FLOW  8/16/2015         ECHO COMPLETE  4/22/2013         FRACTURE SURGERY  2010    Tibial right    HEMORRHOID SURGERY  12/08/2016    KYPHOSIS SURGERY      For comp.  fx T10    LUMBAR DISC SURGERY      OTHER SURGICAL HISTORY  11/1/11    removal r leg external fixator    OTHER SURGICAL HISTORY  12/14/2021    Partial Vaginectomy, Endometrial Biopsy    SIGMOIDOSCOPY N/A 3/19/2021    SIGMOIDOSCOPY HEMORRHOID BANDING performed by Violet Field MD at [facility]      application TSF  3/7/35    UPPER GASTROINTESTINAL ENDOSCOPY  1/4/2016    UPPER GASTROINTESTINAL ENDOSCOPY  07/26/2017       Medications Prior to Admission:    Prior to Admission medications    Medication Sig Start Date End Date Taking? Authorizing Provider   promethazine-codeine (PHENERGAN WITH CODEINE) 6.25-10 MG/5ML syrup Take 5 mLs by mouth 4 times daily as needed for Cough for up to 60 days. 3/9/22 5/8/22  Anabelle Menchaca MD   oxyCODONE-acetaminophen (PERCOCET) 5-325 MG per tablet Take 1 tablet by mouth every 4 hours as needed for Pain (max: 150/month) for up to 30 days. 3/9/22 4/8/22  Anabelle Menchaca MD   Omega 3 1000 MG CAPS Take 1 each by mouth daily 3/9/22   Anabelle Menchaca MD   escitalopram (LEXAPRO) 10 MG tablet Take 1 tablet by mouth 2 times daily 3/9/22 4/8/22  Anabelle Menchaca MD   amoxicillin-clavulanate (AUGMENTIN) 875-125 MG per tablet Take 1 tablet by mouth 2 times daily for 10 days 3/1/22 3/11/22  Cassidy Lozano DO   predniSONE (DELTASONE) 5 MG tablet Take 1 tablet by mouth daily 2/19/22   Gilda Guy DO   albuterol (PROVENTIL) (2.5 MG/3ML) 0.083% nebulizer solution Take 3 mLs by nebulization every 6 hours as needed for Wheezing or Shortness of Breath 2/19/22   Gilda Guy DO   albuterol sulfate HFA (PROVENTIL HFA) 108 (90 Base) MCG/ACT inhaler Inhale 2 puffs into the lungs every 6 hours as needed for Wheezing or Shortness of Breath 2/19/22   Gilda Guy DO   gabapentin (NEURONTIN) 300 MG capsule Take 1 capsule by mouth at bedtime for 90 days.  2/10/22 5/11/22  Anabelle Menchaca MD   famotidine (PEPCID) 20 MG tablet Take 1 tablet by mouth 2 times daily 2/9/22 3/11/22  Anabelle Menchaca MD   desmopressin (DDAVP) 0.1 MG tablet Take 2 tablets by mouth 2 times daily 1/12/22 2/11/22  Luma Moreno MD   ferrous sulfate (IRON 325) 325 (65 Fe) MG tablet Take 1 tablet by mouth 2 times daily 1/12/22 Stevenson Lombard, MD   naloxone 4 MG/0.1ML LIQD nasal spray 1 spray by Nasal route as needed for Opioid Reversal 12/14/21   Halley Marroquin MD   omega-3 acid ethyl esters (LOVAZA) 1 g capsule Take 1 capsule by mouth 2 times daily 11/4/21   Stevenson Lombard, MD   docusate sodium (COLACE) 100 MG capsule Take 1 capsule by mouth 2 times daily  Patient not taking: Reported on 2/10/2022 11/4/21   Stevenson Lombard, MD   apixaban Rise Carwin) 5 MG TABS tablet Take 1 tablet by mouth 2 times daily 11/4/21   Stevenson Lombard, MD   Seabrodave GonzalezTom ELLIPTA 100-25 MCG/INH AEPB inhaler Inhale 1 puff into the lungs daily 11/4/21   Stevenson Lombard, MD   mupirocin [facility]) 2 % ointment Apply topically daily 11/4/21   Stevenson Lombard, MD   levothyroxine (SYNTHROID) 75 MCG tablet Take 1 tablet by mouth daily 11/4/21   Stevenson Lombard, MD   amLODIPine (NORVASC) 5 MG tablet Take 1 tablet by mouth daily  Patient taking differently: Take 5 mg by mouth nightly  8/20/21   Stevenson Lombard, MD   metoprolol succinate (TOPROL XL) 100 MG extended release tablet Take 1 tablet by mouth daily 6/21/21   Stevenson Lombard, MD   metoprolol succinate (TOPROL XL) 50 MG extended release tablet TAKE ONE TABLET EVERY DAY WITH THE 100MG 6/21/21   Stevenson Lombard, MD   Respiratory Therapy Supplies (NEBULIZER/TUBING/MOUTHPIECE) KIT 1 kit by Does not apply route daily as needed (wheezing) 4/2/21   Stevenson Lombard, MD   cycloSPORINE (RESTASIS) 0.05 % ophthalmic emulsion Place 1 drop into both eyes every 12 hours 11/19/20   Stevenson Lombard, MD   hydrocortisone (ANUSOL-HC) 2.5 % CREA rectal cream Apply to affected area BID 10/13/20   Stevenson Lombard, MD   sodium chloride (ALTAMIST SPRAY) 0.65 % nasal spray 1 spray by Nasal route as needed for Congestion 8/6/20   Megha Carroll DO   mupirocin (BACTROBAN) 2 % ointment Apply topically daily 6/8/20   Stevenson Lombard, MD   OXYGEN 4 L/min by Nasal route as needed.  BMS    Historical Provider, MD       No Known Allergies    Family History Problem Relation Age of Onset    Diabetes Mother    [facility] Arthritis Mother     High Blood Pressure Mother     Arthritis Father     High Blood Pressure Father     Diabetes Father     High Blood Pressure Sister     Alcohol Abuse Sister     Substance Abuse Brother     Substance Abuse Sister     Coronary Art Dis Neg Hx     Breast Cancer Neg Hx     Ovarian Cancer Neg Hx        Social History     Tobacco Use    Smoking status: Former Smoker     Packs/day: 0.75     Years: 25.00     Pack years: 18.75     Types: Cigarettes     Start date:      Quit date: 9/10/1996     Years since quittin.5    Smokeless tobacco: Never Used   Vaping Use    Vaping Use: Never used   Substance Use Topics    Alcohol use: Never     Alcohol/week: 0.0 standard drinks    Drug use: Yes     Comment:  tory           PHYSICAL EXAM:    Vitals:    22 1021   BP: 131/88   Pulse: 64   Resp: 20   Temp: 98.2 °F (36.8 °C)   SpO2: 100%       General Appearance:  Laying in bed, awake, alert, no apparent distress  Head/face:  NC/AT  Eyes: PERRL, EOMI  ENT: Bilateral external ears WNL, L naris with mild amount of crusting along septum, no active bleeding, right naris patent without bleeding  Neck:Supple, no adenopathy  Lungs:  Non-labored, good respiratory effort, no stridor  Heart:  RR  Neuro: Facial nerve symmetric and intact. House Brackmann 1/6, bilaterally. LABS:  CBC  Recent Labs     22  1129   WBC 5.5   HGB 9.1*   HCT 31.6*          RADIOLOGY  XR CHEST PORTABLE    Result Date: 3/11/2022  EXAMINATION: ONE XRAY VIEW OF THE CHEST 3/11/2022 11:20 am COMPARISON: 2022. HISTORY: ORDERING SYSTEM PROVIDED HISTORY: chest pain TECHNOLOGIST PROVIDED HISTORY: Reason for exam:->chest pain FINDINGS: Bibasilar pulmonary opacities are seen. The cardiac silhouette is enlarged, which may be in part or entirely due to technique. The thoracic aorta is mildly tortuous.   Prominence of the pulmonary veins in the upper lobes raises the possibility of pulmonary venous hypertension. There is prominent deformity of the right glenohumeral joint. Moderate degenerative changes seen left glenohumeral joint     1. Mild cardiomegaly with evidence of pulmonary venous hypertension. 2. Bibasilar opacities, which may represent atelectasis, pneumonia, effusion or a combination thereof.          ASSESSMENT:  73 y.o. female with intermittent L epistaxis secondary to dry nasal mucosa and oxygen use, history of afib on Eliquis     PLAN:  · Pt refused rhino rocket   · Sinufoam (dissolvable) placed in L naris  · Advised patient to use nasal saline TID and to put O2 prongs in her mouth, not nose  · Pt will follow up with Dr. Ras Babb next week for re-evaluation   · Discussed with her if she has any further bleeding to return to ED and a rhino rocket will be placed with likely embolization    · Seen, examined, discussed with Dr. Ras Babb    Electronically signed by Domenica Cutler DO on 3/11/22 at 1:31 PM EST

## 2022-03-11 NOTE — PROGRESS NOTES
I went to ER to bring Ms. Rodriguez to IR for requested nasal embolization by Dr Bing Cranker. Patient informed me that the plan had changed and we are NOT to do embolization at this time. Sandy TRIPP and I read Dr. Zofia Hernandez note and the embolization per their note is on hold. I called Dr. Bing Cranker  along with Dr. Rashmi Harden and explained what had changed and that patient was not having embolization at this time, both acknowledged. Sandy TRIPP notified.

## 2022-03-14 ENCOUNTER — OFFICE VISIT (OUTPATIENT)
Dept: ENT CLINIC | Age: 67
End: 2022-03-14
Payer: MEDICARE

## 2022-03-14 VITALS
SYSTOLIC BLOOD PRESSURE: 160 MMHG | HEART RATE: 52 BPM | DIASTOLIC BLOOD PRESSURE: 95 MMHG | WEIGHT: 180 LBS | BODY MASS INDEX: 35.34 KG/M2 | HEIGHT: 60 IN

## 2022-03-14 DIAGNOSIS — R04.0 EPISTAXIS: Primary | ICD-10-CM

## 2022-03-14 PROCEDURE — G8482 FLU IMMUNIZE ORDER/ADMIN: HCPCS | Performed by: OTOLARYNGOLOGY

## 2022-03-14 PROCEDURE — 1036F TOBACCO NON-USER: CPT | Performed by: OTOLARYNGOLOGY

## 2022-03-14 PROCEDURE — 4040F PNEUMOC VAC/ADMIN/RCVD: CPT | Performed by: OTOLARYNGOLOGY

## 2022-03-14 PROCEDURE — 99214 OFFICE O/P EST MOD 30 MIN: CPT | Performed by: OTOLARYNGOLOGY

## 2022-03-14 PROCEDURE — G8417 CALC BMI ABV UP PARAM F/U: HCPCS | Performed by: OTOLARYNGOLOGY

## 2022-03-14 PROCEDURE — 3017F COLORECTAL CA SCREEN DOC REV: CPT | Performed by: OTOLARYNGOLOGY

## 2022-03-14 PROCEDURE — 1123F ACP DISCUSS/DSCN MKR DOCD: CPT | Performed by: OTOLARYNGOLOGY

## 2022-03-14 PROCEDURE — G8427 DOCREV CUR MEDS BY ELIG CLIN: HCPCS | Performed by: OTOLARYNGOLOGY

## 2022-03-14 PROCEDURE — 1090F PRES/ABSN URINE INCON ASSESS: CPT | Performed by: OTOLARYNGOLOGY

## 2022-03-14 PROCEDURE — G8399 PT W/DXA RESULTS DOCUMENT: HCPCS | Performed by: OTOLARYNGOLOGY

## 2022-03-14 ASSESSMENT — ENCOUNTER SYMPTOMS
WHEEZING: 0
GASTROINTESTINAL NEGATIVE: 1
COUGH: 0
SINUS PRESSURE: 0
SORE THROAT: 0
COLOR CHANGE: 0
TROUBLE SWALLOWING: 0
RHINORRHEA: 0
CHOKING: 0
STRIDOR: 0
VOICE CHANGE: 0
SINUS PAIN: 0

## 2022-03-14 ASSESSMENT — VISUAL ACUITY: OU: 1

## 2022-03-14 NOTE — PROGRESS NOTES
[facility] Otolaryngology  Dr. Ana Paula Hernandez. DAYANARA Coto Ms.Ed. New Consult       Patient Name:  Misty Patrick  :  1955     CHIEF C/O:    Chief Complaint   Patient presents with    Follow-up     ED 3/11 f/u Epistaxis, on O2       HISTORY OBTAINED FROM:  patient    HISTORY OF PRESENT ILLNESS:       Joyce Marc is a 73y.o. year old female, with history of chronic oxygen requirements here today for epistaxis. Patient was seen 3 days ago for left sided nose bleed and packed with sinufoam and merocel. Since then there has been no bleeding. She has a history of being cauterized in the past by Dr. Freda Hayes.        Past Medical History:   Diagnosis Date    A-fib [facility])     follows with Dr Veda Torres to transfer from chair to wheelchair     with assistance    Abnormal mammogram     Acute on chronic respiratory failure ([address] 2017    Anemia     Anemia due to chronic illness     Ankle fracture, left     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[address] 2007    Backache     Benign hypertension     CHF (congestive heart failure) (HCC)     Chronic back pain     Chronic kidney disease     stage 3    Chronic pain disorder     Chronic, continuous use of opioids     Compression fracture of thoracic vertebra (HCC)     T10    COPD (chronic obstructive pulmonary disease) ([address]     Debility     Deformity of ankle and foot, acquired 2011    Depression     Diabetes insipidus ([address]     Diverticulitis     Dry eyes     Encephalopathy acute 2017    Gait disturbance     GERD (gastroesophageal reflux disease)     Hemorrhoids 2012    Hernia     History of blood transfusion approx 2017    History of Clostridium difficile     negative culture 12-15-15; resolved    Hyperlipidemia     Hypothyroidism     Ischemic colitis, enteritis, or enterocolitis ([address]     Long-term current use of steroids     Lumbar disc herniation     Myalgia and myositis, unspecified     Nephrosclerosis  Nonunion of fracture 02/22/2011    Osteoarthritis     severe    PAF (paroxysmal atrial fibrillation) (HCC)     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HCC)     PCWP mean:26.0 PA mean: 24.00; denies any recent issues    Pulmonary hypertension (HCC)     ventricular diastolic function consistent with abnormal relaxation (stage I)    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, f/u w/ PCP    Rectal bleeding     Rhabdomyolysis 05/09/2011    Steroid-induced avascular necrosis of shoulder ([address]     Right    Tibia fracture 07/2010     Past Surgical History:   Procedure Laterality Date    ABDOMEN SURGERY  2008    ischemic colitis    COLONOSCOPY  2008    healed colitis    COLONOSCOPY  04/11/2014    COLONOSCOPY  11/23/2015    severe colitis    COLONOSCOPY  10/24/2016    COLONOSCOPY  07/26/2017    ECHO COMPL W DOP COLOR FLOW  8/16/2015         ECHO COMPLETE  4/22/2013         FRACTURE SURGERY  2010    Tibial right    HEMORRHOID SURGERY  12/08/2016    KYPHOSIS SURGERY      For comp. fx T10    LUMBAR DISC SURGERY      OTHER SURGICAL HISTORY  11/1/11    removal r leg external fixator    OTHER SURGICAL HISTORY  12/14/2021    Partial Vaginectomy, Endometrial Biopsy    SIGMOIDOSCOPY N/A 3/19/2021    SIGMOIDOSCOPY HEMORRHOID BANDING performed by Daphnie Ontiveros MD at [address] [facility]      application TSF  0/3/99    UPPER GASTROINTESTINAL ENDOSCOPY  1/4/2016    UPPER GASTROINTESTINAL ENDOSCOPY  07/26/2017       Current Outpatient Medications:     promethazine-codeine (PHENERGAN WITH CODEINE) 6.25-10 MG/5ML syrup, Take 5 mLs by mouth 4 times daily as needed for Cough for up to 60 days. , Disp: 473 mL, Rfl: 1    oxyCODONE-acetaminophen (PERCOCET) 5-325 MG per tablet, Take 1 tablet by mouth every 4 hours as needed for Pain (max: 150/month) for up to 30 days. , Disp: 150 tablet, Rfl: 0    Omega 3 1000 MG CAPS, Take 1 each by mouth daily, Disp: 90 capsule, Rfl: 1    escitalopram (LEXAPRO) 10 MG tablet, Take 1 tablet by mouth 2 times daily, Disp: 60 tablet, Rfl: 2    predniSONE (DELTASONE) 5 MG tablet, Take 1 tablet by mouth daily, Disp: 30 tablet, Rfl: 5    albuterol (PROVENTIL) (2.5 MG/3ML) 0.083% nebulizer solution, Take 3 mLs by nebulization every 6 hours as needed for Wheezing or Shortness of Breath, Disp: 120 each, Rfl: 2    albuterol sulfate HFA (PROVENTIL HFA) 108 (90 Base) MCG/ACT inhaler, Inhale 2 puffs into the lungs every 6 hours as needed for Wheezing or Shortness of Breath, Disp: 1 each, Rfl: 5    gabapentin (NEURONTIN) 300 MG capsule, Take 1 capsule by mouth at bedtime for 90 days. , Disp: 90 capsule, Rfl: 0    ferrous sulfate (IRON 325) 325 (65 Fe) MG tablet, Take 1 tablet by mouth 2 times daily, Disp: 60 tablet, Rfl: 5    naloxone 4 MG/0.1ML LIQD nasal spray, 1 spray by Nasal route as needed for Opioid Reversal, Disp: 1 each, Rfl: 5    omega-3 acid ethyl esters (LOVAZA) 1 g capsule, Take 1 capsule by mouth 2 times daily, Disp: 60 capsule, Rfl: 2    docusate sodium (COLACE) 100 MG capsule, Take 1 capsule by mouth 2 times daily, Disp: 60 capsule, Rfl: 3    apixaban (ELIQUIS) 5 MG TABS tablet, Take 1 tablet by mouth 2 times daily, Disp: 60 tablet, Rfl: 5    BREO ELLIPTA 100-25 MCG/INH AEPB inhaler, Inhale 1 puff into the lungs daily, Disp: 28 each, Rfl: 5    mupirocin (BACTROBAN) 2 % ointment, Apply topically daily, Disp: 60 g, Rfl: 2    levothyroxine (SYNTHROID) 75 MCG tablet, Take 1 tablet by mouth daily, Disp: 30 tablet, Rfl: 5    amLODIPine (NORVASC) 5 MG tablet, Take 1 tablet by mouth daily (Patient taking differently: Take 5 mg by mouth nightly ), Disp: 30 tablet, Rfl: 5    metoprolol succinate (TOPROL XL) 100 MG extended release tablet, Take 1 tablet by mouth daily, Disp: 90 tablet, Rfl: 3    metoprolol succinate (TOPROL XL) 50 MG extended release tablet, TAKE ONE TABLET EVERY DAY WITH THE 100MG, Disp: 90 tablet, Rfl: 3    Respiratory Therapy Supplies (NEBULIZER/TUBING/MOUTHPIECE) KIT, 1 kit by Does not apply route daily as needed (wheezing), Disp: 1 kit, Rfl: 2    cycloSPORINE (RESTASIS) 0.05 % ophthalmic emulsion, Place 1 drop into both eyes every 12 hours, Disp: 1 vial, Rfl: 2    hydrocortisone (ANUSOL-HC) 2.5 % CREA rectal cream, Apply to affected area BID, Disp: 1 Tube, Rfl: 5    sodium chloride (ALTAMIST SPRAY) 0.65 % nasal spray, 1 spray by Nasal route as needed for Congestion, Disp: 1 Bottle, Rfl: 3    OXYGEN, 4 L/min by Nasal route as needed. BMS, Disp: , Rfl:     famotidine (PEPCID) 20 MG tablet, Take 1 tablet by mouth 2 times daily, Disp: 60 tablet, Rfl: 2    desmopressin (DDAVP) 0.1 MG tablet, Take 2 tablets by mouth 2 times daily, Disp: 120 tablet, Rfl: 0  Patient has no known allergies. Social History     Tobacco Use    Smoking status: Former Smoker     Packs/day: 0.75     Years: 25.00     Pack years: 18.75     Types: Cigarettes     Start date:      Quit date: 9/10/1996     Years since quittin.5    Smokeless tobacco: Never Used   Vaping Use    Vaping Use: Never used   Substance Use Topics    Alcohol use: Never     Alcohol/week: 0.0 standard drinks    Drug use: Yes     Comment:  tory     Family History   Problem Relation Age of Onset    Diabetes Mother     Arthritis Mother     High Blood Pressure Mother     Arthritis Father     High Blood Pressure Father     Diabetes Father     High Blood Pressure Sister     Alcohol Abuse Sister     Substance Abuse Brother     Substance Abuse Sister     Coronary Art Dis Neg Hx     Breast Cancer Neg Hx     Ovarian Cancer Neg Hx        Review of Systems   Constitutional: Negative for activity change, fatigue and fever. HENT: Positive for nosebleeds. Negative for congestion, ear discharge, ear pain, hearing loss, postnasal drip, rhinorrhea, sinus pressure, sinus pain, sore throat, tinnitus, trouble swallowing and voice change.     Respiratory: Negative for cough, choking, wheezing and stridor. Cardiovascular: Negative for chest pain. Gastrointestinal: Negative. Genitourinary: Negative. Skin: Negative for color change and rash. Neurological: Negative for speech difficulty, light-headedness, numbness and headaches. Hematological: Negative for adenopathy. Psychiatric/Behavioral: Negative for behavioral problems. BP (!) 160/95 (Site: Right Lower Arm, Position: Sitting, Cuff Size: Medium Adult)   Pulse 52   Ht 5' (1.524 m)   Wt 180 lb (81.6 kg)   LMP  (LMP Unknown)   BMI 35.15 kg/m²   Physical Exam  Constitutional:       Appearance: Normal appearance. She is normal weight. HENT:      Head: Normocephalic and atraumatic. Right Ear: Tympanic membrane, ear canal and external ear normal. No drainage. Left Ear: Tympanic membrane, ear canal and external ear normal. No drainage. Nose: Nose normal. No nasal deformity or septal deviation. Comments: Left nasal passage with active oozing from anterior septum and head of the middle turbinate. Right nasal passage with mucous crusting, dry     Mouth/Throat:      Mouth: Mucous membranes are moist.   Eyes:      General: Lids are normal. Vision grossly intact. Extraocular Movements: Extraocular movements intact. Conjunctiva/sclera: Conjunctivae normal.      Pupils: Pupils are equal, round, and reactive to light. Cardiovascular:      Rate and Rhythm: Normal rate. Pulmonary:      Effort: Pulmonary effort is normal.   Musculoskeletal:         General: Normal range of motion. Cervical back: Normal range of motion. Lymphadenopathy:      Cervical: No cervical adenopathy. Skin:     Capillary Refill: Capillary refill takes less than 2 seconds. Neurological:      Mental Status: She is alert. Psychiatric:         Mood and Affect: Mood normal.         IMPRESSION/PLAN:  Patient seen and examined. Recurrent epistaxis, who is currently stable.   No current complaints of new or changing epistaxis. Continue continued nasal irrigations, saline rinse as well as saline gel and follow-up with otolaryngology as needed. Dr. Heide Noland Otolaryngology/Facial Plastic Surgery Residency  Associate Clinical Professor:  Williams Pratt, [facility]

## 2022-03-17 RX ORDER — DESMOPRESSIN ACETATE 0.1 MG/1
0.2 TABLET ORAL 2 TIMES DAILY
Qty: 120 TABLET | Refills: 0 | Status: SHIPPED
Start: 2022-03-17 | End: 2022-05-12

## 2022-03-17 RX ORDER — FLUTICASONE FUROATE AND VILANTEROL TRIFENATATE 100; 25 UG/1; UG/1
1 POWDER RESPIRATORY (INHALATION) DAILY
Qty: 28 EACH | Refills: 5 | Status: SHIPPED | OUTPATIENT
Start: 2022-03-17

## 2022-03-17 NOTE — TELEPHONE ENCOUNTER
Last Appointment:  2/10/2022  Future Appointments   Date Time Provider Mauri Claribel   3/28/2022  2:30 PM Gregory Duran [facility]   4/25/2022  1:20 PM Nadya Borden MD [facility]   6/30/2022  2:30 PM Dee Zamorano MD [address] [place] [address]

## 2022-04-07 DIAGNOSIS — M54.50 CHRONIC BILATERAL LOW BACK PAIN WITHOUT SCIATICA: ICD-10-CM

## 2022-04-07 DIAGNOSIS — F11.90 CHRONIC, CONTINUOUS USE OF OPIOIDS: ICD-10-CM

## 2022-04-07 DIAGNOSIS — G89.29 CHRONIC BILATERAL LOW BACK PAIN WITHOUT SCIATICA: ICD-10-CM

## 2022-04-07 DIAGNOSIS — I10 ESSENTIAL HYPERTENSION: ICD-10-CM

## 2022-04-07 RX ORDER — OXYCODONE HYDROCHLORIDE AND ACETAMINOPHEN 5; 325 MG/1; MG/1
1 TABLET ORAL EVERY 6 HOURS PRN
Qty: 60 TABLET | Refills: 0 | Status: SHIPPED
Start: 2022-04-07 | End: 2022-04-18

## 2022-04-07 RX ORDER — AMLODIPINE BESYLATE 5 MG/1
5 TABLET ORAL NIGHTLY
Qty: 30 TABLET | Refills: 5 | Status: SHIPPED
Start: 2022-04-07 | End: 2022-07-08 | Stop reason: SDUPTHER

## 2022-04-07 NOTE — TELEPHONE ENCOUNTER
Last Appointment:  2/10/2022  Future Appointments   Date Time Provider Mauri Claribel   4/18/2022  1:40 PM MD Haley Shanks [facility]   5/12/2022  2:00 PM MD Haley Shanks [facility]   6/30/2022  2:30 PM Van Carrillo MD [address] [place] [address]

## 2022-04-18 ENCOUNTER — OFFICE VISIT (OUTPATIENT)
Dept: FAMILY MEDICINE CLINIC | Age: 67
End: 2022-04-18
Payer: MEDICARE

## 2022-04-18 VITALS
HEART RATE: 55 BPM | DIASTOLIC BLOOD PRESSURE: 77 MMHG | RESPIRATION RATE: 20 BRPM | SYSTOLIC BLOOD PRESSURE: 124 MMHG | WEIGHT: 177 LBS | TEMPERATURE: 97.2 F | BODY MASS INDEX: 34.75 KG/M2 | OXYGEN SATURATION: 100 % | HEIGHT: 60 IN

## 2022-04-18 DIAGNOSIS — R35.0 URINE FREQUENCY: Primary | ICD-10-CM

## 2022-04-18 DIAGNOSIS — M25.561 RIGHT KNEE PAIN, UNSPECIFIED CHRONICITY: ICD-10-CM

## 2022-04-18 DIAGNOSIS — R35.0 URINE FREQUENCY: ICD-10-CM

## 2022-04-18 DIAGNOSIS — M19.90 OTHER TYPE OF OSTEOARTHRITIS, UNSPECIFIED SITE: ICD-10-CM

## 2022-04-18 DIAGNOSIS — Z87.39 H/O DEGENERATIVE DISC DISEASE: ICD-10-CM

## 2022-04-18 DIAGNOSIS — G89.29 CHRONIC BILATERAL LOW BACK PAIN WITHOUT SCIATICA: ICD-10-CM

## 2022-04-18 DIAGNOSIS — K43.9 VENTRAL HERNIA WITHOUT OBSTRUCTION OR GANGRENE: ICD-10-CM

## 2022-04-18 DIAGNOSIS — F11.90 CHRONIC, CONTINUOUS USE OF OPIOIDS: ICD-10-CM

## 2022-04-18 DIAGNOSIS — M54.50 CHRONIC BILATERAL LOW BACK PAIN WITHOUT SCIATICA: ICD-10-CM

## 2022-04-18 LAB
BACTERIA: ABNORMAL /HPF
BILIRUBIN URINE: ABNORMAL
BILIRUBIN, POC: NEGATIVE
BLOOD URINE, POC: NORMAL
BLOOD, URINE: ABNORMAL
CLARITY, POC: NORMAL
CLARITY: ABNORMAL
COLOR, POC: NORMAL
COLOR: YELLOW
EPITHELIAL CELLS, UA: ABNORMAL /HPF
GLUCOSE URINE, POC: NEGATIVE
GLUCOSE URINE: NEGATIVE MG/DL
KETONES, POC: NORMAL
KETONES, URINE: ABNORMAL MG/DL
LEUKOCYTE EST, POC: NORMAL
LEUKOCYTE ESTERASE, URINE: ABNORMAL
NITRITE, POC: POSITIVE
NITRITE, URINE: POSITIVE
PH UA: 6 (ref 5–9)
PH, POC: 6
PROTEIN UA: 100 MG/DL
PROTEIN, POC: NORMAL
RBC UA: ABNORMAL /HPF (ref 0–2)
SPECIFIC GRAVITY UA: 1.02 (ref 1–1.03)
SPECIFIC GRAVITY, POC: 1.02
UROBILINOGEN, POC: NORMAL
UROBILINOGEN, URINE: 0.2 E.U./DL
WBC UA: ABNORMAL /HPF (ref 0–5)

## 2022-04-18 PROCEDURE — PBSHW URINALYSIS: Performed by: FAMILY MEDICINE

## 2022-04-18 PROCEDURE — 1036F TOBACCO NON-USER: CPT | Performed by: FAMILY MEDICINE

## 2022-04-18 PROCEDURE — G8417 CALC BMI ABV UP PARAM F/U: HCPCS | Performed by: FAMILY MEDICINE

## 2022-04-18 PROCEDURE — 3017F COLORECTAL CA SCREEN DOC REV: CPT | Performed by: FAMILY MEDICINE

## 2022-04-18 PROCEDURE — 4040F PNEUMOC VAC/ADMIN/RCVD: CPT | Performed by: FAMILY MEDICINE

## 2022-04-18 PROCEDURE — 1123F ACP DISCUSS/DSCN MKR DOCD: CPT | Performed by: FAMILY MEDICINE

## 2022-04-18 PROCEDURE — 81002 URINALYSIS NONAUTO W/O SCOPE: CPT | Performed by: FAMILY MEDICINE

## 2022-04-18 PROCEDURE — G8399 PT W/DXA RESULTS DOCUMENT: HCPCS | Performed by: FAMILY MEDICINE

## 2022-04-18 PROCEDURE — 1090F PRES/ABSN URINE INCON ASSESS: CPT | Performed by: FAMILY MEDICINE

## 2022-04-18 PROCEDURE — G8427 DOCREV CUR MEDS BY ELIG CLIN: HCPCS | Performed by: FAMILY MEDICINE

## 2022-04-18 PROCEDURE — 99213 OFFICE O/P EST LOW 20 MIN: CPT | Performed by: FAMILY MEDICINE

## 2022-04-18 PROCEDURE — 99212 OFFICE O/P EST SF 10 MIN: CPT | Performed by: FAMILY MEDICINE

## 2022-04-18 RX ORDER — OXYCODONE HYDROCHLORIDE AND ACETAMINOPHEN 5; 325 MG/1; MG/1
1 TABLET ORAL EVERY 4 HOURS PRN
Qty: 150 TABLET | Refills: 0 | Status: SHIPPED
Start: 2022-04-18 | End: 2022-05-17 | Stop reason: SDUPTHER

## 2022-04-18 RX ORDER — SULFAMETHOXAZOLE AND TRIMETHOPRIM 800; 160 MG/1; MG/1
1 TABLET ORAL 2 TIMES DAILY
Qty: 14 TABLET | Refills: 0 | Status: SHIPPED | OUTPATIENT
Start: 2022-04-18 | End: 2022-04-25

## 2022-04-18 NOTE — PROGRESS NOTES
CC:  Multiple complaints including concern for UTI, otalgia, pain medication renewal    HPI:  73 y.o. female who presents for multiple complaints including concern for UTI, otalgia, and pain medication renewal.    Concern for Urinary Tract Infection  Patient states that she is currently experiencing dysuria. Feels that it may be a UTI since she has had these symptoms before and been positive for a infection. Notes that her urine has also been dark. Denies any hematuria, fevers or flank pain. Otalgia  Feels that her ears have been \" popping\" with some increased pain in left ear. Pain started approximately 2 weeks ago. Very rapid, pain is on and off. Nothing seems to make it better or worse  Seen by ENT 1 month ago ; noted to have no ear concerns at the time, normal exam.    Pain Medication Renewal  Patient continues to ask for increase in pain medication script    No other concerns at this time. ROS:    Negative, addressed as above. Objective:    VS:  Blood pressure 124/77, pulse 55, temperature 97.2 °F (36.2 °C), temperature source Temporal, resp. rate 20, height 5' (1.524 m), weight 177 lb (80.3 kg), SpO2 100 %, not currently breastfeeding. Physical Exam  Constitutional:       Appearance: She is obese. Comments: Wheelchair bound   HENT:      Right Ear: Tympanic membrane, ear canal and external ear normal. There is no impacted cerumen. Left Ear: Tympanic membrane, ear canal and external ear normal. There is no impacted cerumen. Nose: Nose normal. No congestion. Mouth/Throat:      Mouth: Mucous membranes are moist.      Pharynx: No oropharyngeal exudate or posterior oropharyngeal erythema. Cardiovascular:      Rate and Rhythm: Normal rate and regular rhythm. Pulses: Normal pulses. Heart sounds: Normal heart sounds. Abdominal:      General: Bowel sounds are normal.      Palpations: Abdomen is soft. Hernia: A hernia is present.       Comments: Ventral abdominal hernia noted, with continued drainage  Bandages changed  Difficulty with CVA testing due to patient being wheelchair bound   Skin:     General: Skin is warm. Assessment/Plan:    1. Urine frequency  Patient history concerning for UTI  - POCT Urinalysis no Micro ; revealing leuk esterase and nitrites  Sent for urinalysis and culture  Will start treatment with bactrim DS for 7 days  - Urinalysis; Future  - Culture, Urine; Future  - sulfamethoxazole-trimethoprim (BACTRIM DS;SEPTRA DS) 800-160 MG per tablet; Take 1 tablet by mouth 2 times daily for 7 days  Dispense: 14 tablet; Refill: 0    2. Ventral hernia without obstruction or gangrene  Patient's ventral hernia with continued drainage  Patient has been referred to Wound Care before but has not gone due to difficulty with rides  Advised that insurance can aid with rides and we can also continue to figure out other resources  Amenable at this time. Re-refer to wound care. - ADRIANA ESPINOZA [facility], [facility] - [facility]    3. Chronic, continuous use of opioids  Refill percocet  - oxyCODONE-acetaminophen (PERCOCET) 5-325 MG per tablet; Take 1 tablet by mouth every 4 hours as needed for Pain (max: 150/month) for up to 30 days. Dispense: 150 tablet; Refill: 0    4. Chronic bilateral low back pain without sciatica  As above  - oxyCODONE-acetaminophen (PERCOCET) 5-325 MG per tablet; Take 1 tablet by mouth every 4 hours as needed for Pain (max: 150/month) for up to 30 days. Dispense: 150 tablet; Refill: 0     5. H/o degenerative disc disease and right knee pain  Patient previously following with Orthopedics  Has not seen them since 2016  Charts reviewed; at the time - receiving injections for right knee pain  In order to get chronic pain under control , need to start addressing chronic concerns. Will refer to Orthopedics at this time with concerns for OA and worsening DDD. RTO 1 month or sooner prn for any persistent, new, or worsening symptoms.       Please see Patient Instructions for further counseling and information given. Advised to please be adherent to the treatment plans discussed today, and please call with any questions or concerns, letting the office know of any reasons that the plans may not be followed. The risks of untreated conditions include worsening illness, injury, disability, and possibly, death. Please call if symptoms change in any way, worsen, or fail to completely resolve, as this could necessitate a change to treatment plans. Patient and/or caregiver expressed understanding. Indications and proper use of medication(s) reviewed. Potential side-effects and risks of medication(s) also explained. Patient and/or caregiver was instructed to call if any new symptoms develop prior to next visit. Health risk factors discussed and addressed.      Electronically signed by Trevor Menon MD PGY-2 on 4/18/2022 at 2:39 PM  This case was discussed with attending physician: Dr. Osmany Torrez

## 2022-04-18 NOTE — PROGRESS NOTES
S: 73 y.o. female here for otalgia, UTI, chronic pain. Otalgia for month. Some popping. Sinus congestion. No fever or ear drainage. No flank pain, just some dysuria. No fever. Opioid dependence for open wound from surgery 10 years ago. Hasn't gone to wound care. O: VS: /77 (Site: Left Upper Arm, Position: Sitting, Cuff Size: Medium Adult)   Pulse 55   Temp 97.2 °F (36.2 °C) (Temporal)   Resp 20   Ht 5' (1.524 m)   Wt 177 lb (80.3 kg)   LMP  (LMP Unknown)   SpO2 100%   BMI 34.57 kg/m²    General: NAD, alert and interacting appropriately. ENT: nl exam   CV:  RRR, no gallops, rubs, or murmurs    Resp: CTAB   Abd:  Soft, nontender. abd wound w/ bandage   Ext: In wheelchair    Impression: otalgia, UTI, chronic pain. Chronic wound. Plan:   F/u w/ ENT  Bactrim 7 days  Referral to pain mngt  Referral to ortho  Referral to wound care    Attending Physician Statement  I have discussed the case, including pertinent history and exam findings with the resident. I agree with the documented assessment and plan.

## 2022-04-21 ENCOUNTER — TELEPHONE (OUTPATIENT)
Dept: FAMILY MEDICINE CLINIC | Age: 67
End: 2022-04-21

## 2022-04-21 LAB
ORGANISM: ABNORMAL
URINE CULTURE, ROUTINE: ABNORMAL

## 2022-04-21 NOTE — TELEPHONE ENCOUNTER
Paul Barrera called in and is stating that her new script for Omega 3 is to only take 1 capsule daily and she has been taking 2 capsule for over a year now. Can you please check into this and send in a new script.     Please advise    Thank you

## 2022-04-25 NOTE — TELEPHONE ENCOUNTER
Called patient and left message stating that she may continue to take Omega 3 BID. Note was made and changed. Left clinic number if she has any concerns.

## 2022-04-27 DIAGNOSIS — R52 PAIN MANAGEMENT: Primary | ICD-10-CM

## 2022-04-27 DIAGNOSIS — J44.9 COPD, MODERATE (HCC): ICD-10-CM

## 2022-04-27 DIAGNOSIS — Z76.0 MEDICATION REFILL: ICD-10-CM

## 2022-04-27 RX ORDER — PROMETHAZINE HYDROCHLORIDE AND CODEINE PHOSPHATE 6.25; 1 MG/5ML; MG/5ML
5 SYRUP ORAL 4 TIMES DAILY PRN
Qty: 473 ML | Refills: 1 | Status: SHIPPED
Start: 2022-04-27 | End: 2022-06-13 | Stop reason: SDUPTHER

## 2022-04-27 RX ORDER — OMEGA-3-ACID ETHYL ESTERS 1 G/1
1 CAPSULE, LIQUID FILLED ORAL 2 TIMES DAILY
Qty: 60 CAPSULE | Refills: 2 | Status: SHIPPED
Start: 2022-04-27 | End: 2022-08-16 | Stop reason: SDUPTHER

## 2022-04-27 NOTE — TELEPHONE ENCOUNTER
Last Appointment:  4/18/2022  Future Appointments   Date Time Provider Mauri Sanford   5/19/2022  2:20 PM MD Diane Green [place] [facility]   6/30/2022  2:30 PM Shanika Velazco MD [address] [place] [address]

## 2022-05-06 ENCOUNTER — TELEPHONE (OUTPATIENT)
Dept: ORTHOPEDIC SURGERY | Age: 67
End: 2022-05-06

## 2022-05-06 NOTE — TELEPHONE ENCOUNTER
Referral from pcp received for patient to see ortho trma. Providers have reviewed referral and have declined it. They recommend referral be sent to Dr. Viet De Luna. PCP notified and referral scanned into patient's media.

## 2022-05-10 DIAGNOSIS — Z76.0 MEDICATION REFILL: ICD-10-CM

## 2022-05-11 NOTE — TELEPHONE ENCOUNTER
Last Appointment:  4/18/2022  Future Appointments   Date Time Provider Mauri Sanford   5/19/2022  2:20 PM [facility] [place] [facility]   6/30/2022  2:30 PM Sonia Frias MD [address] [place] [address]

## 2022-05-12 RX ORDER — DESMOPRESSIN ACETATE 0.1 MG/1
0.2 TABLET ORAL 2 TIMES DAILY
Qty: 120 TABLET | Refills: 0 | Status: SHIPPED
Start: 2022-05-12 | End: 2022-06-13 | Stop reason: SDUPTHER

## 2022-05-16 DIAGNOSIS — M54.50 CHRONIC BILATERAL LOW BACK PAIN WITHOUT SCIATICA: ICD-10-CM

## 2022-05-16 DIAGNOSIS — G89.29 CHRONIC BILATERAL LOW BACK PAIN WITHOUT SCIATICA: ICD-10-CM

## 2022-05-16 DIAGNOSIS — F11.90 CHRONIC, CONTINUOUS USE OF OPIOIDS: ICD-10-CM

## 2022-05-16 NOTE — TELEPHONE ENCOUNTER
Last Appointment:  4/18/2022  Future Appointments   Date Time Provider Mauri Sanford   5/19/2022  2:20 PM [facility]   6/30/2022  2:30 PM Opal Nolasco MD [address] [place] [address]

## 2022-05-17 RX ORDER — OXYCODONE HYDROCHLORIDE AND ACETAMINOPHEN 5; 325 MG/1; MG/1
1 TABLET ORAL EVERY 4 HOURS PRN
Qty: 10 TABLET | Refills: 0 | Status: SHIPPED
Start: 2022-05-17 | End: 2022-05-19 | Stop reason: SDUPTHER

## 2022-05-17 RX ORDER — ALBUTEROL SULFATE 90 UG/1
2 AEROSOL, METERED RESPIRATORY (INHALATION) EVERY 6 HOURS PRN
Qty: 1 EACH | Refills: 5 | Status: SHIPPED | OUTPATIENT
Start: 2022-05-17

## 2022-05-17 NOTE — TELEPHONE ENCOUNTER
Discussed with Dr. Esteban Carroll  Patient given 10 pills - percocet in order to make it to appointment  This will be a one time occurrence  After this, patient needs to be scheduled within 30 days in order to have her opioids refilled    Thank you!   Pradip Fall MD  5/17/2022  12:30 PM

## 2022-05-19 ENCOUNTER — OFFICE VISIT (OUTPATIENT)
Dept: FAMILY MEDICINE CLINIC | Age: 67
End: 2022-05-19
Payer: MEDICARE

## 2022-05-19 VITALS
OXYGEN SATURATION: 95 % | TEMPERATURE: 97.2 F | DIASTOLIC BLOOD PRESSURE: 64 MMHG | HEART RATE: 62 BPM | SYSTOLIC BLOOD PRESSURE: 97 MMHG | WEIGHT: 177 LBS | BODY MASS INDEX: 34.75 KG/M2 | HEIGHT: 60 IN

## 2022-05-19 DIAGNOSIS — Z23 NEED FOR PNEUMOCOCCAL VACCINE: ICD-10-CM

## 2022-05-19 DIAGNOSIS — F11.90 CHRONIC, CONTINUOUS USE OF OPIOIDS: ICD-10-CM

## 2022-05-19 DIAGNOSIS — Z00.00 INITIAL MEDICARE ANNUAL WELLNESS VISIT: ICD-10-CM

## 2022-05-19 DIAGNOSIS — Z71.89 ACP (ADVANCE CARE PLANNING): Primary | ICD-10-CM

## 2022-05-19 DIAGNOSIS — M54.50 CHRONIC BILATERAL LOW BACK PAIN WITHOUT SCIATICA: ICD-10-CM

## 2022-05-19 DIAGNOSIS — R35.0 URINARY FREQUENCY: ICD-10-CM

## 2022-05-19 DIAGNOSIS — M35.3 POLYMYALGIA RHEUMATICA SYNDROME (HCC): ICD-10-CM

## 2022-05-19 DIAGNOSIS — G89.29 CHRONIC BILATERAL LOW BACK PAIN WITHOUT SCIATICA: ICD-10-CM

## 2022-05-19 PROCEDURE — 99212 OFFICE O/P EST SF 10 MIN: CPT | Performed by: FAMILY MEDICINE

## 2022-05-19 PROCEDURE — 3017F COLORECTAL CA SCREEN DOC REV: CPT | Performed by: FAMILY MEDICINE

## 2022-05-19 PROCEDURE — G8399 PT W/DXA RESULTS DOCUMENT: HCPCS | Performed by: FAMILY MEDICINE

## 2022-05-19 PROCEDURE — G8417 CALC BMI ABV UP PARAM F/U: HCPCS | Performed by: FAMILY MEDICINE

## 2022-05-19 PROCEDURE — 1123F ACP DISCUSS/DSCN MKR DOCD: CPT | Performed by: FAMILY MEDICINE

## 2022-05-19 PROCEDURE — G0438 PPPS, INITIAL VISIT: HCPCS | Performed by: FAMILY MEDICINE

## 2022-05-19 PROCEDURE — 1090F PRES/ABSN URINE INCON ASSESS: CPT | Performed by: FAMILY MEDICINE

## 2022-05-19 PROCEDURE — G8427 DOCREV CUR MEDS BY ELIG CLIN: HCPCS | Performed by: FAMILY MEDICINE

## 2022-05-19 PROCEDURE — 1036F TOBACCO NON-USER: CPT | Performed by: FAMILY MEDICINE

## 2022-05-19 RX ORDER — OXYCODONE HYDROCHLORIDE AND ACETAMINOPHEN 5; 325 MG/1; MG/1
1 TABLET ORAL EVERY 4 HOURS PRN
Qty: 150 TABLET | Refills: 0 | Status: SHIPPED
Start: 2022-05-19 | End: 2022-06-13 | Stop reason: SDUPTHER

## 2022-05-19 ASSESSMENT — LIFESTYLE VARIABLES
HOW OFTEN DO YOU HAVE A DRINK CONTAINING ALCOHOL: NEVER
HOW MANY STANDARD DRINKS CONTAINING ALCOHOL DO YOU HAVE ON A TYPICAL DAY: 1 OR 2
HOW OFTEN DO YOU HAVE A DRINK CONTAINING ALCOHOL: NEVER
HOW MANY STANDARD DRINKS CONTAINING ALCOHOL DO YOU HAVE ON A TYPICAL DAY: PATIENT DECLINED

## 2022-05-19 ASSESSMENT — COLUMBIA-SUICIDE SEVERITY RATING SCALE - C-SSRS
1. WITHIN THE PAST MONTH, HAVE YOU WISHED YOU WERE DEAD OR WISHED YOU COULD GO TO SLEEP AND NOT WAKE UP?: NO
6. HAVE YOU EVER DONE ANYTHING, STARTED TO DO ANYTHING, OR PREPARED TO DO ANYTHING TO END YOUR LIFE?: NO
2. HAVE YOU ACTUALLY HAD ANY THOUGHTS OF KILLING YOURSELF?: NO

## 2022-05-19 ASSESSMENT — PATIENT HEALTH QUESTIONNAIRE - PHQ9
3. TROUBLE FALLING OR STAYING ASLEEP: 2
SUM OF ALL RESPONSES TO PHQ QUESTIONS 1-9: 12
1. LITTLE INTEREST OR PLEASURE IN DOING THINGS: 2
1. LITTLE INTEREST OR PLEASURE IN DOING THINGS: 2
2. FEELING DOWN, DEPRESSED OR HOPELESS: 1
5. POOR APPETITE OR OVEREATING: 0
SUM OF ALL RESPONSES TO PHQ QUESTIONS 1-9: 12
4. FEELING TIRED OR HAVING LITTLE ENERGY: 2
SUM OF ALL RESPONSES TO PHQ QUESTIONS 1-9: 3
SUM OF ALL RESPONSES TO PHQ QUESTIONS 1-9: 3
7. TROUBLE CONCENTRATING ON THINGS, SUCH AS READING THE NEWSPAPER OR WATCHING TELEVISION: 2
2. FEELING DOWN, DEPRESSED OR HOPELESS: 1
8. MOVING OR SPEAKING SO SLOWLY THAT OTHER PEOPLE COULD HAVE NOTICED. OR THE OPPOSITE, BEING SO FIGETY OR RESTLESS THAT YOU HAVE BEEN MOVING AROUND A LOT MORE THAN USUAL: 2
6. FEELING BAD ABOUT YOURSELF - OR THAT YOU ARE A FAILURE OR HAVE LET YOURSELF OR YOUR FAMILY DOWN: 1
SUM OF ALL RESPONSES TO PHQ QUESTIONS 1-9: 3
10. IF YOU CHECKED OFF ANY PROBLEMS, HOW DIFFICULT HAVE THESE PROBLEMS MADE IT FOR YOU TO DO YOUR WORK, TAKE CARE OF THINGS AT HOME, OR GET ALONG WITH OTHER PEOPLE: 0
SUM OF ALL RESPONSES TO PHQ9 QUESTIONS 1 & 2: 3
SUM OF ALL RESPONSES TO PHQ QUESTIONS 1-9: 12
SUM OF ALL RESPONSES TO PHQ9 QUESTIONS 1 & 2: 3
SUM OF ALL RESPONSES TO PHQ QUESTIONS 1-9: 12
SUM OF ALL RESPONSES TO PHQ QUESTIONS 1-9: 3
9. THOUGHTS THAT YOU WOULD BE BETTER OFF DEAD, OR OF HURTING YOURSELF: 0

## 2022-05-19 NOTE — PROGRESS NOTES
This is a 73-year-old female here for her annual wellness checkup    Patient states that she is doing well overall  Red flags on the annual wellness included stressors, lack of physical activity, drug use (prescribed) , elevated depression screen and safety concerns    Stressors  States that her memory has been worsening, and that she has chronic pain  Continues to ask for increase in Percocet dosing  Refuses to trial a longer acting pain occasion including MS Contin  Memory worsening, patient feels that she cannot remember things as she used to. Lack of physical activity  And states that this is due to her chronic right knee pain, osteoarthritis  Of note patient has been referred to orthopedic surgery for further evaluation  She is amenable to PT OT at this time though would prefer it to be at home due to difficulty with rides and transportation  At home she is normally laying in bed, or in her wheelchair  Other form of physical activity    Prescribed drug use  Patient presently taking Percocet, 5 tabs daily. She is prescribed a dose of 150 tablets a month  Amenable to urine drug screen at this time    Elevated depression screen  Patient scored 12 on PHQ-9  Denies any suicidal homicidal ideation  Would not like to try any medications at this time or any therapy. ACP planning  Patient does not have a living will on file  Would like to remain full code  Amenable to ACP specialist referral    Transportation issues  Patient amenable to care coordination referral    Blood pressure 97/64, pulse 62, temperature 97.2 °F (36.2 °C), temperature source Temporal, height 5' (1.524 m), weight 177 lb (80.3 kg), SpO2 95 %, not currently breastfeeding.     Physical exam deferred    A/P:    Med[employer] annual wellness visit  Issues discussed as above  Will refer patient to care coordination, will prescribe home PT and wound care  Refill Percocet  Patient amenable to drug screen   Microscopic urinalysis for concern of UTI  Patient amenable to pneumococcal vaccine. Attending Physician Statement  I have discussed the case, including pertinent history and exam findings with the resident. I agree with the documented assessment and plan.

## 2022-05-19 NOTE — PROGRESS NOTES
Medicare Annual Wellness Visit    Pelon Viramontes is here for Medicare AWV    Assessment & Plan   ACP (advance care planning)  -     [facility] Referral to ACP Clinical Specialist  Polymyalgia rheumatica syndrome ([facility] [address]  -     [address] [address]  Chronic, continuous use of opioids  -     oxyCODONE-acetaminophen (PERCOCET) 5-325 MG per tablet; Take 1 tablet by mouth every 4 hours as needed for Pain (max: 150/month) for up to 30 days. , Disp-150 tablet, R-0Normal  Chronic bilateral low back pain without sciatica  -     oxyCODONE-acetaminophen (PERCOCET) 5-325 MG per tablet; Take 1 tablet by mouth every 4 hours as needed for Pain (max: 150/month) for up to 30 days. , Disp-150 tablet, R-0Normal  -     [facility]  Urinary frequency  -     POCT Urinalysis no Micro  Need for pneumococcal vaccine  -     PNEUMOVAX 23 subcutaneous/IM (Pneumococcal polysaccharide vaccine 23-valent >= 3yo)      Recommendations for Preventive Services Due: see orders and patient instructions/AVS.  Recommended screening schedule for the next 5-10 years is provided to the patient in written form: see Patient Instructions/AVS.     Return in 1 year (on 5/19/2023) for Medicare Annual Wellness Visit in 1 year. Subjective     Arm soreness    Code status : all 4 sections reviewed ; patient would like to be full code  Does not want to speak to ACP specialist    Patient's complete Health Risk Assessment and screening values have been reviewed and are found in Flowsheets. The following problems were reviewed today and where indicated follow up appointments were made and/or referrals ordered.     Positive Risk Factor Screenings with Interventions:      Depression:  PHQ-2 Score: 3  PHQ-9 Total Score: 12    Severity:1-4 = minimal depression, 5-9 = mild depression, 10-14 = moderate depression, 15-19 = moderately severe depression, 20-27 = severe depression    Depression Interventions:  · Relaxation techniques discussed  · Patient declines any further evaluation/treatment for this issue      Drug Use Screening:    DAST-10 Score Interpretation:  1-2: Low level - Monitor, re-assess at a later date; 3-5: Moderate level - Further Investigation; 6-8: Substantial level - Intensive Assessment; 9-10: Severe level - Intensive Assessment    Substance Use - Drug Use Interventions:  patient is not ready to change his/her recreational drug use behavior at this time        General Health and ACP:  General  In general, how would you say your health is?: (!) Poor  In the past 7 days, have you experienced any of the following: New or Increased Pain, New or Increased Fatigue, Loneliness, Social Isolation, Stress or Anger?: (!) Yes  Select all that apply: (!) Stress  Do you get the social and emotional support that you need?: Yes  Do you have a Living Will?: (!) No    Advance Directives     Power of  Living Will ACP-Advance Directive ACP-Power of     Not on File Not on File Filed Not on File      General Health Risk Interventions:  · Poor self-assessment of health status: feels that her memory has not been great, reviewed MMSE , normal/low score for memory impairement  · Pain issues: medication prescribed- patient on chronic opioids  · No Living Will: Amenable to referral to ACP specialist    Health Habits/Nutrition:     Physical Activity: Inactive    Days of Exercise per Week: 0 days    Minutes of Exercise per Session: 0 min     Have you lost any weight without trying in the past 3 months?: (!) Yes  Body mass index: (!) 34.56  Have you seen the dentist within the past year?: (!) No    Health Habits/Nutrition Interventions:  · Inadequate physical activity:  Patient amenable to home PT    Hearing/Vision:  Do you or your family notice any trouble with your hearing that hasn't been managed with hearing aids?: No  Do you have difficulty driving, watching TV, or doing any of your daily activities because of your eyesight?: (!) Yes  Have you had an eye exam within the past year?: Yes  No exam data present    Hearing/Vision Interventions:  · Patient has gone to her annual vision screen : Eye Care. No recent hearing concerns     Safety:  Do you have working smoke detectors?: (!) No  Do you have any tripping hazards - clutter in doorways, halls, or stairs?: No  Do you have either shower bars, grab bars, non-slip mats or non-slip surfaces in your shower or bathtub?: (!) No  Do all of your stairways have a railing or banister?: Yes  Do you always fasten your seatbelt when you are in a car?: Yes    Safety Interventions:  · Home safety tips provided  · Home PT  · Referred for Care Coordination           Objective   Vitals:    05/19/22 1414 05/19/22 1429   BP: (!) 93/56 97/64   Pulse: 62    Temp: 97.2 °F (36.2 °C)    TempSrc: Temporal    SpO2: 95%    Weight: 177 lb (80.3 kg)    Height: 5' (1.524 m)       Body mass index is 34.57 kg/m². No Known Allergies  Prior to Visit Medications    Medication Sig Taking? Authorizing Provider   oxyCODONE-acetaminophen (PERCOCET) 5-325 MG per tablet Take 1 tablet by mouth every 4 hours as needed for Pain (max: 150/month) for up to 10 doses. Yes William Groev MD   albuterol sulfate HFA (PROVENTIL HFA) 108 (90 Base) MCG/ACT inhaler Inhale 2 puffs into the lungs every 6 hours as needed for Wheezing or Shortness of Breath Yes William Grove MD   desmopressin (DDAVP) 0.1 MG tablet Take 2 tablets by mouth 2 times daily Yes William Grove MD   promethazine-codeine (PHENERGAN WITH CODEINE) 6.25-10 MG/5ML syrup Take 5 mLs by mouth 4 times daily as needed for Cough for up to 60 days.  Yes William Grove MD   omega-3 acid ethyl esters (LOVAZA) 1 g capsule Take 1 capsule by mouth 2 times daily Yes William Grove MD   amLODIPine (NORVASC) 5 MG tablet Take 1 tablet by mouth nightly Yes William Grove MD   BREO ELLIPTA 100-25 MCG/INH AEPB inhaler Inhale 1 puff into the lungs daily Yes William Grove MD   Omega 3 1000 MG CAPS Take 1 each by mouth daily Yes Vitor Caro MD   predniSONE (DELTASONE) 5 MG tablet Take 1 tablet by mouth daily Yes Jefferson Covarrubias DO   albuterol (PROVENTIL) (2.5 MG/3ML) 0.083% nebulizer solution Take 3 mLs by nebulization every 6 hours as needed for Wheezing or Shortness of Breath Yes Jefferson Covarrubias DO   ferrous sulfate (IRON 325) 325 (65 Fe) MG tablet Take 1 tablet by mouth 2 times daily Yes Joseph Renteria MD   naloxone 4 MG/0.1ML LIQD nasal spray 1 spray by Nasal route as needed for Opioid Reversal Yes Maria E Rock MD   docusate sodium (COLACE) 100 MG capsule Take 1 capsule by mouth 2 times daily Yes Joseph Renteria MD   apixaban (ELIQUIS) 5 MG TABS tablet Take 1 tablet by mouth 2 times daily Yes Joseph Renteria MD   levothyroxine (SYNTHROID) 75 MCG tablet Take 1 tablet by mouth daily Yes Joseph Renteria MD   metoprolol succinate (TOPROL XL) 100 MG extended release tablet Take 1 tablet by mouth daily Yes Joseph Renteria MD   metoprolol succinate (TOPROL XL) 50 MG extended release tablet TAKE ONE TABLET EVERY DAY WITH THE 100MG Yes Joseph Renteria MD   Respiratory Therapy Supplies (NEBULIZER/TUBING/MOUTHPIECE) KIT 1 kit by Does not apply route daily as needed (wheezing) Yes Joseph Renteria MD   cycloSPORINE (RESTASIS) 0.05 % ophthalmic emulsion Place 1 drop into both eyes every 12 hours Yes Joseph Renteria MD   hydrocortisone (ANUSOL-HC) 2.5 % CREA rectal cream Apply to affected area BID Yes Joseph Renteria MD   sodium chloride (ALTAMIST SPRAY) 0.65 % nasal spray 1 spray by Nasal route as needed for Congestion Yes Artemus Hammans,    OXYGEN 4 L/min by Nasal route as needed. BMS Yes Nellie Burt MD   escitalopram (LEXAPRO) 10 MG tablet Take 1 tablet by mouth 2 times daily  Vitor Caro MD   gabapentin (NEURONTIN) 300 MG capsule Take 1 capsule by mouth at bedtime for 90 days.   Vitor Caro MD   famotidine (PEPCID) 20 MG tablet Take 1 tablet by mouth 2 times daily  Vitor Caro MD   mupirocin (BACTROBAN) 2 % ointment Apply topically daily  Patient not taking: Reported on 4/18/2022  Nita Agudelo MD   mupirocin Loral Moulding) 2 % ointment Apply topically daily  Nita Agudelo MD       CareTeam (Including outside providers/suppliers regularly involved in providing care):   Patient Care Team:  Josefina Ryan MD as PCP - General (Family Medicine)  Flex Hays MD as PCP - Endocrinology (Nephrology)  Jobie Spatz, MD as PCP - Hematology/Oncology (Infectious Diseases)  Shoshana Hernandez MD as PCP - Gastroenterology (Gastroenterology)  Nita Agudelo MD as PCP - [facility] Empaneled Provider  Peyton Alex MD as Consulting Physician (Cardiology)  Ruben Bloch, LSW as  (Care Coordinator)  Denisse Toure MD as Consulting Physician (Cardiology)     Reviewed and updated this visit:  Allergies  Meds       Case reviewed with attending physician Dr. ANDREWS [facility]

## 2022-05-20 ENCOUNTER — CLINICAL DOCUMENTATION (OUTPATIENT)
Dept: SPIRITUAL SERVICES | Age: 67
End: 2022-05-20

## 2022-05-20 NOTE — PATIENT INSTRUCTIONS
Personalized Preventive Plan for Sang Worthington - 5/19/2022  Medicare offers a range of preventive health benefits. Some of the tests and screenings are paid in full while other may be subject to a deductible, co-insurance, and/or copay. Some of these benefits include a comprehensive review of your medical history including lifestyle, illnesses that may run in your family, and various assessments and screenings as appropriate. After reviewing your medical record and screening and assessments performed today your provider may have ordered immunizations, labs, imaging, and/or referrals for you. A list of these orders (if applicable) as well as your Preventive Care list are included within your After Visit Summary for your review. Other Preventive Recommendations:    · A preventive eye exam performed by an eye specialist is recommended every 1-2 years to screen for glaucoma; cataracts, macular degeneration, and other eye disorders. · A preventive dental visit is recommended every 6 months. · Try to get at least 150 minutes of exercise per week or 10,000 steps per day on a pedometer . · Order or download the FREE \"Exercise & Physical Activity: Your Everyday Guide\" from [employer]. Call 7-429.336.7170 or search [employer] online. · You need 4420-0517 mg of calcium and 1098-5272 IU of vitamin D per day. It is possible to meet your calcium requirement with diet alone, but a vitamin D supplement is usually necessary to meet this goal.  · When exposed to the sun, use a sunscreen that protects against both UVA and UVB radiation with an SPF of 30 or greater. Reapply every 2 to 3 hours or after sweating, drying off with a towel, or swimming. · Always wear a seat belt when traveling in a car. Always wear a helmet when riding a bicycle or motorcycle.

## 2022-05-23 ENCOUNTER — TELEPHONE (OUTPATIENT)
Dept: FAMILY MEDICINE CLINIC | Age: 67
End: 2022-05-23

## 2022-05-23 NOTE — TELEPHONE ENCOUNTER
[facility] can not take the referral , they do not have the staff to cover .  Message left on voice mail  Requesting a calll back with a new preference for home care referral.

## 2022-06-07 NOTE — ACP (ADVANCE CARE PLANNING)
Advance Care Planning   Ambulatory ACP Specialist Patient Outreach    Date:  5/20/2022  ACP Specialist:  Ej Gomez    Outreach call to patient in follow-up to ACP Specialist referral from: Heather Mac MD    [x] PCP  [] Provider   [] Ambulatory Care Management [] Other for Reason:    [x] Advance Directive Assistance  [] Code Status Discussion  [] Complete Portable DNR Order  [] Discuss Goals of Care  [] Complete POST/MOST  [] Early ACP Decision-Making  [] Other    Date Referral Received: 5/20/22    Today's Outreach:  [] First   [x] Second  [] Third                               Third outreach made by []  phone  [] email []   Fotofeedback     Intervention:  [] Spoke with Patient  [x] Left VM requesting return call      Outcome: I left a voice message requesting a return call. Next Step:   [x] ACP scheduled conversation  [] Outreach again in one week               [] Email / Mail ACP Info Sheets  [] Email / Mail Advance Directive            [] Close Referral. Routing closure to referring provider/staff and to ACP Specialist .      Thank you for this referral.

## 2022-06-10 RX ORDER — METOPROLOL SUCCINATE 50 MG/1
TABLET, EXTENDED RELEASE ORAL
Qty: 90 TABLET | Refills: 3 | Status: SHIPPED
Start: 2022-06-10 | End: 2022-07-08 | Stop reason: SDUPTHER

## 2022-06-10 RX ORDER — ESCITALOPRAM OXALATE 10 MG/1
10 TABLET ORAL 2 TIMES DAILY
Qty: 60 TABLET | Refills: 2 | Status: SHIPPED
Start: 2022-06-10 | End: 2022-07-08 | Stop reason: SDUPTHER

## 2022-06-10 RX ORDER — FERROUS SULFATE 325(65) MG
325 TABLET ORAL 2 TIMES DAILY
Qty: 60 TABLET | Refills: 5 | Status: SHIPPED
Start: 2022-06-10 | End: 2022-07-08 | Stop reason: SDUPTHER

## 2022-06-10 RX ORDER — LEVOTHYROXINE SODIUM 0.07 MG/1
75 TABLET ORAL DAILY
Qty: 30 TABLET | Refills: 5 | Status: SHIPPED
Start: 2022-06-10 | End: 2022-07-08 | Stop reason: SDUPTHER

## 2022-06-10 RX ORDER — FAMOTIDINE 20 MG/1
20 TABLET, FILM COATED ORAL 2 TIMES DAILY
Qty: 60 TABLET | Refills: 2 | Status: SHIPPED
Start: 2022-06-10 | End: 2022-07-08 | Stop reason: SDUPTHER

## 2022-06-10 RX ORDER — DOCUSATE SODIUM 100 MG/1
100 CAPSULE, LIQUID FILLED ORAL 2 TIMES DAILY
Qty: 60 CAPSULE | Refills: 3 | Status: ON HOLD
Start: 2022-06-10 | End: 2022-09-16 | Stop reason: HOSPADM

## 2022-06-10 RX ORDER — METOPROLOL SUCCINATE 100 MG/1
100 TABLET, EXTENDED RELEASE ORAL DAILY
Qty: 90 TABLET | Refills: 3 | Status: SHIPPED
Start: 2022-06-10 | End: 2022-07-08 | Stop reason: SDUPTHER

## 2022-06-10 NOTE — TELEPHONE ENCOUNTER
Last Appointment:  5/19/2022  Future Appointments   Date Time Provider Mauri Nicolei   6/13/2022  2:40 PM [facility]   6/30/2022  2:30 PM Paul Herzog MD [address] [place] [address]

## 2022-06-13 ENCOUNTER — OFFICE VISIT (OUTPATIENT)
Dept: FAMILY MEDICINE CLINIC | Age: 67
End: 2022-06-13
Payer: MEDICARE

## 2022-06-13 VITALS
DIASTOLIC BLOOD PRESSURE: 62 MMHG | WEIGHT: 177 LBS | RESPIRATION RATE: 20 BRPM | HEART RATE: 87 BPM | TEMPERATURE: 97.2 F | SYSTOLIC BLOOD PRESSURE: 96 MMHG | HEIGHT: 60 IN | BODY MASS INDEX: 34.75 KG/M2 | OXYGEN SATURATION: 96 %

## 2022-06-13 DIAGNOSIS — F11.90 CHRONIC, CONTINUOUS USE OF OPIOIDS: Primary | ICD-10-CM

## 2022-06-13 DIAGNOSIS — N39.0 URINARY TRACT INFECTION WITHOUT HEMATURIA, SITE UNSPECIFIED: ICD-10-CM

## 2022-06-13 DIAGNOSIS — R32 URINARY INCONTINENCE, UNSPECIFIED TYPE: ICD-10-CM

## 2022-06-13 DIAGNOSIS — M54.50 CHRONIC BILATERAL LOW BACK PAIN WITHOUT SCIATICA: ICD-10-CM

## 2022-06-13 DIAGNOSIS — G89.4 CHRONIC PAIN SYNDROME: ICD-10-CM

## 2022-06-13 DIAGNOSIS — J44.9 COPD, MODERATE (HCC): ICD-10-CM

## 2022-06-13 DIAGNOSIS — G89.29 CHRONIC BILATERAL LOW BACK PAIN WITHOUT SCIATICA: ICD-10-CM

## 2022-06-13 LAB
BILIRUBIN, POC: NEGATIVE
BLOOD URINE, POC: NEGATIVE
CLARITY, POC: NORMAL
COLOR, POC: YELLOW
GLUCOSE URINE, POC: NEGATIVE
KETONES, POC: NORMAL
LEUKOCYTE EST, POC: NEGATIVE
NITRITE, POC: NEGATIVE
PH, POC: 7
PROTEIN, POC: NEGATIVE
SPECIFIC GRAVITY, POC: 1.02
UROBILINOGEN, POC: 0.2

## 2022-06-13 PROCEDURE — G8399 PT W/DXA RESULTS DOCUMENT: HCPCS | Performed by: FAMILY MEDICINE

## 2022-06-13 PROCEDURE — 81002 URINALYSIS NONAUTO W/O SCOPE: CPT | Performed by: FAMILY MEDICINE

## 2022-06-13 PROCEDURE — 3017F COLORECTAL CA SCREEN DOC REV: CPT | Performed by: FAMILY MEDICINE

## 2022-06-13 PROCEDURE — G8417 CALC BMI ABV UP PARAM F/U: HCPCS | Performed by: FAMILY MEDICINE

## 2022-06-13 PROCEDURE — 1123F ACP DISCUSS/DSCN MKR DOCD: CPT | Performed by: FAMILY MEDICINE

## 2022-06-13 PROCEDURE — 1036F TOBACCO NON-USER: CPT | Performed by: FAMILY MEDICINE

## 2022-06-13 PROCEDURE — 99212 OFFICE O/P EST SF 10 MIN: CPT | Performed by: FAMILY MEDICINE

## 2022-06-13 PROCEDURE — 99213 OFFICE O/P EST LOW 20 MIN: CPT | Performed by: FAMILY MEDICINE

## 2022-06-13 PROCEDURE — 0509F URINE INCON PLAN DOCD: CPT | Performed by: FAMILY MEDICINE

## 2022-06-13 PROCEDURE — 3023F SPIROM DOC REV: CPT | Performed by: FAMILY MEDICINE

## 2022-06-13 PROCEDURE — G8427 DOCREV CUR MEDS BY ELIG CLIN: HCPCS | Performed by: FAMILY MEDICINE

## 2022-06-13 PROCEDURE — 1090F PRES/ABSN URINE INCON ASSESS: CPT | Performed by: FAMILY MEDICINE

## 2022-06-13 RX ORDER — DESMOPRESSIN ACETATE 0.1 MG/1
0.2 TABLET ORAL 2 TIMES DAILY
Qty: 120 TABLET | Refills: 2 | Status: SHIPPED | OUTPATIENT
Start: 2022-06-13 | End: 2022-09-11

## 2022-06-13 RX ORDER — OXYCODONE HYDROCHLORIDE AND ACETAMINOPHEN 5; 325 MG/1; MG/1
1 TABLET ORAL EVERY 4 HOURS PRN
Qty: 145 TABLET | Refills: 0 | Status: SHIPPED
Start: 2022-06-13 | End: 2022-07-08 | Stop reason: SDUPTHER

## 2022-06-13 RX ORDER — PROMETHAZINE HYDROCHLORIDE AND CODEINE PHOSPHATE 6.25; 1 MG/5ML; MG/5ML
5 SYRUP ORAL 4 TIMES DAILY PRN
Qty: 473 ML | Refills: 1 | Status: SHIPPED
Start: 2022-06-13 | End: 2022-07-08 | Stop reason: SDUPTHER

## 2022-06-13 RX ORDER — GABAPENTIN 300 MG/1
300 CAPSULE ORAL NIGHTLY
Qty: 90 CAPSULE | Refills: 0 | Status: SHIPPED
Start: 2022-06-13 | End: 2022-07-08

## 2022-06-13 NOTE — PROGRESS NOTES
S: 73 y.o. female here for chronic pain. Trouble getting in with pain mngt. Takes percocet up to 5 times per day. O: VS: BP 96/62   Pulse 87   Temp 97.2 °F (36.2 °C)   Resp 20   Ht 5' (1.524 m)   Wt 177 lb (80.3 kg)   LMP  (LMP Unknown)   SpO2 96%   BMI 34.57 kg/m²        Impression: chronic pain, opioid dependence. Plan:   Ext ref to pain clinic    Attending Physician Statement  I have discussed the case, including pertinent history and exam findings with the resident. I agree with the documented assessment and plan.

## 2022-06-13 NOTE — PROGRESS NOTES
[address] [facility]  Department of [facility]  Family Medicine Residency Program    Date of Visit: 2022       Chief Complaint     Chief Complaint   Patient presents with    Follow-up     chronic respiratory failure        History of Present Illness     HPI:  73 y.o. female with pmh of chronic pain (on percocet), HTN, HFpEF who presents for medication refill and facial itching. Chronic Pain  Patient presenting for medication refill  Currently taking 5-325 mg percocet 4-5 times daily  Interested in increasing dose, as previously documented because she will take less \"pills\" if on a higher dose. Advised that higher dose , even with less pills accounts for more medication , and that our current plan is to keep her the same or titrate down. Pain has been stable, with no new changes. Facial Itching  Patient states she has noticed increased itching across her chin  She has tried emollients without much relief   Feels she is scratching more than usual    No other concerns at this time. Social History     Tobacco Use    Smoking status: Former Smoker     Packs/day: 0.75     Years: 25.00     Pack years: 18.75     Types: Cigarettes     Start date:      Quit date: 9/10/1996     Years since quittin.7    Smokeless tobacco: Never Used   Vaping Use    Vaping Use: Never used   Substance Use Topics    Alcohol use: Never     Alcohol/week: 0.0 standard drinks    Drug use: Yes     Comment:  tory DOW:    Reviewed, pertinent as above otherwise negative    Objective:    VS:  Blood pressure 96/62, pulse 87, temperature 97.2 °F (36.2 °C), resp. rate 20, height 5' (1.524 m), weight 177 lb (80.3 kg), SpO2 96 %, not currently breastfeeding. Physical Exam  Constitutional:       Appearance: She is obese. Comments: Wheelchair bound   Cardiovascular:      Rate and Rhythm: Normal rate and regular rhythm. Pulses: Normal pulses. Heart sounds: Normal heart sounds. Pulmonary:      Effort: Pulmonary effort is normal.      Breath sounds: Normal breath sounds. Skin:     General: Skin is warm. Comments: Coarse hairs noted on chin  No excoriations , pruritis, dry/ scaling skin or rashes noted. Neurological:      Mental Status: She is alert and oriented to person, place, and time. Psychiatric:         Mood and Affect: Mood normal.         Behavior: Behavior normal.         Thought Content: Thought content normal.         Judgment: Judgment normal.       Assessment/Plan:    1. Chronic, continuous use of opioids  Refill percocet , reduce tabs  - oxyCODONE-acetaminophen (PERCOCET) 5-325 MG per tablet; Take 1 tablet by mouth every 4 hours as needed for Pain (max: 150/month) for up to 30 days. Dispense: 145 tablet; Refill: 0    2. Chronic pain syndrome  Refill gabapentin  - gabapentin (NEURONTIN) 300 MG capsule; Take 1 capsule by mouth at bedtime for 90 days. Dispense: 90 capsule; Refill: 0  - External Referral To Pain Clinic    3. Chronic bilateral low back pain without sciatica  - oxyCODONE-acetaminophen (PERCOCET) 5-325 MG per tablet; Take 1 tablet by mouth every 4 hours as needed for Pain (max: 150/month) for up to 30 days. Dispense: 145 tablet; Refill: 0    4. Urinary tract infection without hematuria, site unspecified  Patient with previous urine infection  Repeat urinalysis this time negative  - POCT Urinalysis no Micro    5. Urinary incontinence, unspecified type  Refill desmopressin  - desmopressin (DDAVP) 0.1 mg tablet; Take 2 tablets by mouth 2 times daily  Dispense: 120 tablet; Refill: 2    6. COPD, moderate ([address]  Refill phenergan  - promethazine-codeine (PHENERGAN WITH CODEINE) 6.25-10 MG/5ML syrup; Take 5 mLs by mouth 4 times daily as needed for Cough for up to 60 days. Dispense: 473 mL; Refill: 1     RTO 1 month for opioid refill and any other new problems or sooner prn for any persistent, new, or worsening symptoms.       Please see Patient Instructions for further counseling and information given. Advised to please be adherent to the treatment plans discussed today, and please call with any questions or concerns, letting the office know of any reasons that the plans may not be followed. The risks of untreated conditions include worsening illness, injury, disability, and possibly, death. Please call if symptoms change in any way, worsen, or fail to completely resolve, as this could necessitate a change to treatment plans. Patient and/or caregiver expressed understanding. Indications and proper use of medication(s) reviewed. Potential side-effects and risks of medication(s) also explained. Patient and/or caregiver was instructed to call if any new symptoms develop prior to next visit. Health risk factors discussed and addressed.      Electronically signed by Dunia James MD PGY-2 on 6/13/2022 at 3:21 PM  This case was discussed with attending physician: Dr. Ritchie Grijalva

## 2022-06-14 NOTE — ACP (ADVANCE CARE PLANNING)
Advance Care Planning   Ambulatory ACP Specialist Patient Outreach    Date:  5/20/2022  ACP Specialist:  Alvina Palmer    Outreach call to patient in follow-up to ACP Specialist referral from: Juan Pardo MD    [x] PCP  [] Provider   [] Ambulatory Care Management [] Other for Reason:    [x] Advance Directive Assistance  [] Code Status Discussion  [] Complete Portable DNR Order  [] Discuss Goals of Care  [] Complete POST/MOST  [] Early ACP Decision-Making  [] Other    Date Referral Received: 5/20/22    Today's Outreach:  [] First   [] Second  [x] Third                               Third outreach made by []  phone  [] email []   [employer]     Intervention:  [] Spoke with Patient  [x] Left VM requesting return call      Outcome: I left a message letting Terrance Yoders know I am closing the referral but that I am available should she want to complete documents. She does have my contact information and living will/POA documents. Next Step:   [] ACP scheduled conversation  [] Outreach again in one week               [] Email / Mail ACP Info Sheets  [] Email / Mail Advance Directive            [x] Close Referral. Routing closure to referring provider/staff and to ACP Specialist .      Thank you for this referral.

## 2022-06-15 NOTE — TELEPHONE ENCOUNTER
[address] requesting refill for cimetidine 400mg 1 bid. Pt on famotidine 20mg 1 tab bid. We reviewed the chart and looks like tagamet was d/c'd 12/14/21 on discharge. Please advise.

## 2022-06-30 RX ORDER — CIMETIDINE 400 MG/1
400 TABLET, FILM COATED ORAL 2 TIMES DAILY
Qty: 180 TABLET | Refills: 3 | OUTPATIENT
Start: 2022-06-30

## 2022-07-08 ENCOUNTER — OFFICE VISIT (OUTPATIENT)
Dept: FAMILY MEDICINE CLINIC | Age: 67
End: 2022-07-08
Payer: MEDICARE

## 2022-07-08 VITALS
HEART RATE: 93 BPM | OXYGEN SATURATION: 100 % | WEIGHT: 177 LBS | SYSTOLIC BLOOD PRESSURE: 130 MMHG | DIASTOLIC BLOOD PRESSURE: 87 MMHG | HEIGHT: 60 IN | TEMPERATURE: 97.7 F | BODY MASS INDEX: 34.75 KG/M2

## 2022-07-08 DIAGNOSIS — I10 ESSENTIAL HYPERTENSION: ICD-10-CM

## 2022-07-08 DIAGNOSIS — F33.41 RECURRENT MAJOR DEPRESSIVE DISORDER, IN PARTIAL REMISSION (HCC): ICD-10-CM

## 2022-07-08 DIAGNOSIS — D86.9 SARCOIDOSIS: ICD-10-CM

## 2022-07-08 DIAGNOSIS — H04.123 DRY EYES: ICD-10-CM

## 2022-07-08 DIAGNOSIS — E03.9 HYPOTHYROIDISM, UNSPECIFIED TYPE: ICD-10-CM

## 2022-07-08 DIAGNOSIS — M25.561 CHRONIC PAIN OF RIGHT KNEE: ICD-10-CM

## 2022-07-08 DIAGNOSIS — J44.9 COPD, MODERATE (HCC): ICD-10-CM

## 2022-07-08 DIAGNOSIS — F11.90 CHRONIC, CONTINUOUS USE OF OPIOIDS: Primary | ICD-10-CM

## 2022-07-08 DIAGNOSIS — E61.1 IRON DEFICIENCY: ICD-10-CM

## 2022-07-08 DIAGNOSIS — I48.0 PAF (PAROXYSMAL ATRIAL FIBRILLATION) (HCC): ICD-10-CM

## 2022-07-08 DIAGNOSIS — K21.9 GASTROESOPHAGEAL REFLUX DISEASE WITHOUT ESOPHAGITIS: ICD-10-CM

## 2022-07-08 DIAGNOSIS — G89.29 CHRONIC PAIN OF RIGHT KNEE: ICD-10-CM

## 2022-07-08 PROCEDURE — 99213 OFFICE O/P EST LOW 20 MIN: CPT | Performed by: FAMILY MEDICINE

## 2022-07-08 PROCEDURE — 99212 OFFICE O/P EST SF 10 MIN: CPT | Performed by: FAMILY MEDICINE

## 2022-07-08 PROCEDURE — 1090F PRES/ABSN URINE INCON ASSESS: CPT | Performed by: FAMILY MEDICINE

## 2022-07-08 PROCEDURE — G8427 DOCREV CUR MEDS BY ELIG CLIN: HCPCS | Performed by: FAMILY MEDICINE

## 2022-07-08 PROCEDURE — G8417 CALC BMI ABV UP PARAM F/U: HCPCS | Performed by: FAMILY MEDICINE

## 2022-07-08 PROCEDURE — 3023F SPIROM DOC REV: CPT | Performed by: FAMILY MEDICINE

## 2022-07-08 PROCEDURE — G8399 PT W/DXA RESULTS DOCUMENT: HCPCS | Performed by: FAMILY MEDICINE

## 2022-07-08 PROCEDURE — 1123F ACP DISCUSS/DSCN MKR DOCD: CPT | Performed by: FAMILY MEDICINE

## 2022-07-08 PROCEDURE — 1036F TOBACCO NON-USER: CPT | Performed by: FAMILY MEDICINE

## 2022-07-08 PROCEDURE — 3017F COLORECTAL CA SCREEN DOC REV: CPT | Performed by: FAMILY MEDICINE

## 2022-07-08 RX ORDER — ESCITALOPRAM OXALATE 10 MG/1
10 TABLET ORAL 2 TIMES DAILY
Qty: 60 TABLET | Refills: 2 | Status: SHIPPED
Start: 2022-07-08 | End: 2022-09-12

## 2022-07-08 RX ORDER — FAMOTIDINE 20 MG/1
20 TABLET, FILM COATED ORAL 2 TIMES DAILY
Qty: 60 TABLET | Refills: 2 | Status: SHIPPED
Start: 2022-07-08 | End: 2022-09-12

## 2022-07-08 RX ORDER — PREDNISONE 1 MG/1
5 TABLET ORAL DAILY
Qty: 30 TABLET | Refills: 5 | Status: SHIPPED | OUTPATIENT
Start: 2022-07-08

## 2022-07-08 RX ORDER — OXYCODONE HYDROCHLORIDE AND ACETAMINOPHEN 5; 325 MG/1; MG/1
1 TABLET ORAL EVERY 4 HOURS PRN
Qty: 150 TABLET | Refills: 0 | Status: SHIPPED
Start: 2022-07-13 | End: 2022-08-16 | Stop reason: SDUPTHER

## 2022-07-08 RX ORDER — GABAPENTIN 300 MG/1
300 CAPSULE ORAL NIGHTLY
Qty: 90 CAPSULE | Refills: 0 | Status: CANCELLED | OUTPATIENT
Start: 2022-07-08 | End: 2022-10-06

## 2022-07-08 RX ORDER — AMLODIPINE BESYLATE 5 MG/1
5 TABLET ORAL NIGHTLY
Qty: 30 TABLET | Refills: 5 | Status: SHIPPED
Start: 2022-07-08 | End: 2022-08-30 | Stop reason: ALTCHOICE

## 2022-07-08 RX ORDER — PROMETHAZINE HYDROCHLORIDE AND CODEINE PHOSPHATE 6.25; 1 MG/5ML; MG/5ML
5 SYRUP ORAL 4 TIMES DAILY PRN
Qty: 473 ML | Refills: 1 | Status: SHIPPED
Start: 2022-07-08 | End: 2022-08-29 | Stop reason: SDUPTHER

## 2022-07-08 RX ORDER — METOPROLOL SUCCINATE 50 MG/1
TABLET, EXTENDED RELEASE ORAL
Qty: 90 TABLET | Refills: 3 | Status: SHIPPED
Start: 2022-07-08 | End: 2022-09-12

## 2022-07-08 RX ORDER — METOPROLOL SUCCINATE 100 MG/1
100 TABLET, EXTENDED RELEASE ORAL DAILY
Qty: 90 TABLET | Refills: 3 | Status: SHIPPED
Start: 2022-07-08 | End: 2022-09-12

## 2022-07-08 RX ORDER — CYCLOSPORINE 0.5 MG/ML
1 EMULSION OPHTHALMIC EVERY 12 HOURS
Qty: 5.5 ML | Refills: 1 | Status: SHIPPED | OUTPATIENT
Start: 2022-07-08

## 2022-07-08 RX ORDER — FERROUS SULFATE 325(65) MG
325 TABLET ORAL 2 TIMES DAILY
Qty: 60 TABLET | Refills: 5 | Status: SHIPPED | OUTPATIENT
Start: 2022-07-08

## 2022-07-08 RX ORDER — OXYCODONE HYDROCHLORIDE AND ACETAMINOPHEN 5; 325 MG/1; MG/1
1 TABLET ORAL EVERY 4 HOURS PRN
Qty: 145 TABLET | Refills: 0 | Status: CANCELLED | OUTPATIENT
Start: 2022-07-08 | End: 2022-08-07

## 2022-07-08 RX ORDER — LEVOTHYROXINE SODIUM 0.07 MG/1
75 TABLET ORAL DAILY
Qty: 30 TABLET | Refills: 5 | Status: SHIPPED | OUTPATIENT
Start: 2022-07-08

## 2022-07-08 RX ORDER — LIDOCAINE 50 MG/G
1 PATCH TOPICAL DAILY
Qty: 10 PATCH | Refills: 0 | Status: SHIPPED | OUTPATIENT
Start: 2022-07-08 | End: 2022-07-18

## 2022-07-08 NOTE — PROGRESS NOTES
This is a 50-year-old female with past medical history of paroxysmal A. fib not on Eliquis, chronic back pain, chronic pain syndrome, with history of chronic use of opioids who presents for regular follow-up and prescription refill    Current complaints today include    1. Worsening chronic right knee pain  Patient with history of right femur fracture, last right knee x-ray in 2018  States that her right knee pain is worsening  Limited ambulation at home, normally presenting in a wheelchair, though does rest her feet at home  Current pain management for history of chronic pain patient takes Percocet 5-325 mg prescribed through family medicine, unwilling to decrease dose. Also takes codeine-Phenergan cough syrup for chronic cough  Has not tried any other medications, describes normally as chronic, throbbing pain    2. Buttocks pain  Patient refusing examination  Describes it as feeling loose , with \" flabby skin\"  Denies any diarrhea, loss of bowel/bladder incontinence    No other ROS positive at this time, no other health maintenance due at this time    Blood pressure 130/87, pulse 93, temperature 97.7 °F (36.5 °C), temperature source Temporal, height 5' (1.524 m), weight 177 lb (80.3 kg), SpO2 100 %, not currently breastfeeding. Heart irregularly irregular rhythm consistent with history of paroxysmal atrial fibrillation   lungs clear    abd non-tender      No edema  MSK knee exam: With crepitus and minimal tenderness bilateral knees, R> L, muscle tightness with extension, limited pain with extension. Pulses intact       Assessment/plan:    Chronic pain syndrome  Refill codeine and Phenergan syrup, Percocet  Advised patient that Percocet dose will not start till 7/13/2022  Continue to monitor  Patient does not want to continue with gabapentin 300 mg 3 times daily as it did not help her pain, has been also once patient off this medication, will discontinue at this time. Paroxysmal A.  Fib  Patient was previously on Eliquis, though self discontinued by her and her   Advised to risk and benefits of continuing Eliquis  Has been states that patient was bleeding from multiple orifices, hence he stopped the medication. Plan to overall risk of stroke without Eliquis as of 4 %/year  With Eliquis, decreased risk up to 1 %/year  Will come to decision if they would like to restart medication   Continued on metoprolol 100 mg and 50 mg daily for rate control    Right knee pain, chronic  Secondary to arthritis, though now worsening  Last x-ray of the right knee in 2018  Patient amenable to repeat at this time  Advised use of lidocaine patch   Can also alternate with TENS unit, will hold off on prescription for now    Medication refill  All other home medications including Pepcid, iron, Norvasc refilled today for conditions including acid reflux, iron deficiency anemia and hypertension    Follow-up 1 month for medication refill    Attending Physician Statement  I have discussed the case, including pertinent history and exam findings with the resident. I agree with the documented assessment and plan.

## 2022-07-08 NOTE — PROGRESS NOTES
[address] [facility]  Department of Family Medicine  Family Medicine Residency Program    Date of Visit: 2022     Chief Complaint     Chief Complaint   Patient presents with    Other     f/u    Hand Pain     and feet bilateral    Other     bed sore on her bottom         History of Present Illness     HPI:  73 y.o. female presents for worsening chronic right knee pain, gluteal pain    Worsening chronic right knee pain  Patient with history of right femur fracture, last right knee x-ray in 2018  States that her right knee pain is worsening  Limited ambulation at home, normally presenting in a wheelchair, though does rest her feet at home  Current pain management for history of chronic pain patient takes Percocet 5-325 mg prescribed through family medicine, unwilling to decrease dose. Also takes codeine-Phenergan cough syrup for chronic cough  Has not tried any other medications, describes normally as chronic, throbbing pain     Gluteal pain  Patient refusing examination  Describes it as feeling loose , with \" flabby skin\"  Denies any diarrhea, loss of bowel/bladder incontinence    Social History     Tobacco Use    Smoking status: Former Smoker     Packs/day: 0.75     Years: 25.00     Pack years: 18.75     Types: Cigarettes     Start date:      Quit date: 9/10/1996     Years since quittin.8    Smokeless tobacco: Never Used   Vaping Use    Vaping Use: Never used   Substance Use Topics    Alcohol use: Never     Alcohol/week: 0.0 standard drinks    Drug use: Yes     Comment: Tatiana DOW:    Reviewed, pertinent as above otherwise negative    Objective:    VS:  Blood pressure 130/87, pulse 93, temperature 97.7 °F (36.5 °C), temperature source Temporal, height 5' (1.524 m), weight 177 lb (80.3 kg), SpO2 100 %, not currently breastfeeding. Physical Exam  Constitutional:       Appearance: She is obese. She is not ill-appearing. Comments:  In a wheelchair Cardiovascular:      Pulses: Normal pulses. Heart sounds: Normal heart sounds. No murmur heard. No gallop. Comments: Irregularly irregular rhythm  Pulmonary:      Effort: Pulmonary effort is normal. No respiratory distress. Breath sounds: Normal breath sounds. No wheezing. Abdominal:      General: Bowel sounds are normal. There is no distension. Palpations: Abdomen is soft. Tenderness: There is no abdominal tenderness. Comments: Chronic abdominal hernia/mass noted   Musculoskeletal:         General: No tenderness. Comments: Bilateral knee crepitus R> L  Exam limited secondary to patient being in wheelchair and in pain   Skin:     General: Skin is warm. Neurological:      Mental Status: She is alert and oriented to person, place, and time. Assessment/Plan    1. Chronic, continuous use of opioids  Refill Percocet, start date next week 7/14 2022  Checked patient's pill bottle, patient already out of medications  Advised patient that she will not get a refill until 1 month is up, she is amenable  - oxyCODONE-acetaminophen (PERCOCET) 5-325 MG per tablet; Take 1 tablet by mouth every 4 hours as needed for Pain (max: 150/month) for up to 30 days. Dispense: 150 tablet; Refill: 0    2. COPD, moderate ([address]  Refill Phenergan with codeine  - promethazine-codeine (PHENERGAN WITH CODEINE) 6.25-10 MG/5ML syrup; Take 5 mLs by mouth 4 times daily as needed for Cough for up to 60 days. Dispense: 473 mL; Refill: 1    3. Essential hypertension  Refill Toprol and amlodipine, BP under good control today  - metoprolol succinate (TOPROL XL) 100 MG extended release tablet; Take 1 tablet by mouth daily  Dispense: 90 tablet; Refill: 3  - metoprolol succinate (TOPROL XL) 50 MG extended release tablet; TAKE ONE TABLET EVERY DAY WITH THE 100MG  Dispense: 90 tablet; Refill: 3  - amLODIPine (NORVASC) 5 MG tablet; Take 1 tablet by mouth nightly  Dispense: 30 tablet; Refill: 5    4.  Chronic pain of right knee  Patient with increased crepitus in right knee, will repeat x-ray at this time, advised lidocaine patch and possibility of TENS unit; will hold open prescription for now to see how lidocaine patch improves symptoms  - XR KNEE RIGHT (1-2 VIEWS); Future    5. Dry eyes  Refill cyclosporine  - cycloSPORINE (RESTASIS) 0.05 % ophthalmic emulsion; Place 1 drop into both eyes every 12 hours  Dispense: 5.5 mL; Refill: 1    6. Sarcoidosis  Refill Deltasone  - predniSONE (DELTASONE) 5 MG tablet; Take 1 tablet by mouth daily  Dispense: 30 tablet; Refill: 5    7. Iron deficiency  Refill ferrous sulfate  Last iron studies in 2019 with iron of 58, iron saturation of 24, TIBC 246  We will continue to monitor  - ferrous sulfate (IRON 325) 325 (65 Fe) MG tablet; Take 1 tablet by mouth 2 times daily  Dispense: 60 tablet; Refill: 5    8. Gastroesophageal reflux disease without esophagitis  Stable, refill Pepcid  - famotidine (PEPCID) 20 MG tablet; Take 1 tablet by mouth 2 times daily  Dispense: 60 tablet; Refill: 2    9. Recurrent major depressive disorder, in partial remission (HCC)  Stable refill Lexapro  - escitalopram (LEXAPRO) 10 MG tablet; Take 1 tablet by mouth 2 times daily  Dispense: 60 tablet; Refill: 2    10. Hypothyroidism, unspecified type  Stable refill Synthroid  - levothyroxine (SYNTHROID) 75 MCG tablet; Take 1 tablet by mouth daily  Dispense: 30 tablet; Refill: 5    11. PAF (paroxysmal atrial fibrillation) (HCC)  Patient with irregularly irregular rhythm today  Was previously on Eliquis, self DC'd secondary to bleeding from different sites  Advised of stroke risk with and without Eliquis; stroke risk without Eliquis 4 %/year, with Eliquis 1 %/year  Increased risk for DVT/PE/stroke  Patient advised of all risks and benefits  Would like to monitor off of this  - metoprolol succinate (TOPROL XL) 100 MG extended release tablet; Take 1 tablet by mouth daily  Dispense: 90 tablet;  Refill: 3  - metoprolol succinate

## 2022-07-09 ENCOUNTER — APPOINTMENT (OUTPATIENT)
Dept: CT IMAGING | Age: 67
End: 2022-07-09
Payer: MEDICARE

## 2022-07-09 ENCOUNTER — HOSPITAL ENCOUNTER (EMERGENCY)
Age: 67
Discharge: HOME | End: 2022-07-09
Attending: EMERGENCY MEDICINE
Payer: MEDICARE

## 2022-07-09 VITALS
TEMPERATURE: 98.1 F | SYSTOLIC BLOOD PRESSURE: 103 MMHG | WEIGHT: 177 LBS | RESPIRATION RATE: 17 BRPM | BODY MASS INDEX: 34.75 KG/M2 | OXYGEN SATURATION: 93 % | DIASTOLIC BLOOD PRESSURE: 72 MMHG | HEIGHT: 60 IN | HEART RATE: 99 BPM

## 2022-07-09 DIAGNOSIS — R10.84 GENERALIZED ABDOMINAL PAIN: Primary | ICD-10-CM

## 2022-07-09 LAB
ALBUMIN SERPL-MCNC: 3.6 G/DL (ref 3.5–5.2)
ALP BLD-CCNC: 74 U/L (ref 35–104)
ALT SERPL-CCNC: 10 U/L (ref 0–32)
ANION GAP SERPL CALCULATED.3IONS-SCNC: 11 MMOL/L (ref 7–16)
AST SERPL-CCNC: 18 U/L (ref 0–31)
BASOPHILS ABSOLUTE: 0.02 E9/L (ref 0–0.2)
BASOPHILS RELATIVE PERCENT: 0.4 % (ref 0–2)
BILIRUB SERPL-MCNC: 0.6 MG/DL (ref 0–1.2)
BUN BLDV-MCNC: 24 MG/DL (ref 6–23)
CALCIUM SERPL-MCNC: 10 MG/DL (ref 8.6–10.2)
CHLORIDE BLD-SCNC: 99 MMOL/L (ref 98–107)
CO2: 34 MMOL/L (ref 22–29)
CREAT SERPL-MCNC: 1.4 MG/DL (ref 0.5–1)
EOSINOPHILS ABSOLUTE: 0.26 E9/L (ref 0.05–0.5)
EOSINOPHILS RELATIVE PERCENT: 5.3 % (ref 0–6)
GFR AFRICAN AMERICAN: 46
GFR NON-AFRICAN AMERICAN: 46 ML/MIN/1.73
GLUCOSE BLD-MCNC: 79 MG/DL (ref 74–99)
HCT VFR BLD CALC: 32.2 % (ref 34–48)
HEMOGLOBIN: 9.4 G/DL (ref 11.5–15.5)
IMMATURE GRANULOCYTES #: 0.01 E9/L
IMMATURE GRANULOCYTES %: 0.2 % (ref 0–5)
LIPASE: 10 U/L (ref 13–60)
LYMPHOCYTES ABSOLUTE: 0.7 E9/L (ref 1.5–4)
LYMPHOCYTES RELATIVE PERCENT: 14.4 % (ref 20–42)
MCH RBC QN AUTO: 25.1 PG (ref 26–35)
MCHC RBC AUTO-ENTMCNC: 29.2 % (ref 32–34.5)
MCV RBC AUTO: 85.9 FL (ref 80–99.9)
MONOCYTES ABSOLUTE: 0.67 E9/L (ref 0.1–0.95)
MONOCYTES RELATIVE PERCENT: 13.8 % (ref 2–12)
NEUTROPHILS ABSOLUTE: 3.2 E9/L (ref 1.8–7.3)
NEUTROPHILS RELATIVE PERCENT: 65.9 % (ref 43–80)
PDW BLD-RTO: 14.2 FL (ref 11.5–15)
PLATELET # BLD: 137 E9/L (ref 130–450)
PMV BLD AUTO: 11 FL (ref 7–12)
POTASSIUM SERPL-SCNC: 4 MMOL/L (ref 3.5–5)
RBC # BLD: 3.75 E12/L (ref 3.5–5.5)
SODIUM BLD-SCNC: 144 MMOL/L (ref 132–146)
TOTAL PROTEIN: 6.4 G/DL (ref 6.4–8.3)
WBC # BLD: 4.9 E9/L (ref 4.5–11.5)

## 2022-07-09 PROCEDURE — 6370000000 HC RX 637 (ALT 250 FOR IP): Performed by: EMERGENCY MEDICINE

## 2022-07-09 PROCEDURE — 6360000004 HC RX CONTRAST MEDICATION: Performed by: RADIOLOGY

## 2022-07-09 PROCEDURE — 83690 ASSAY OF LIPASE: CPT

## 2022-07-09 PROCEDURE — 80053 COMPREHEN METABOLIC PANEL: CPT

## 2022-07-09 PROCEDURE — 85025 COMPLETE CBC W/AUTO DIFF WBC: CPT

## 2022-07-09 PROCEDURE — 99285 EMERGENCY DEPT VISIT HI MDM: CPT

## 2022-07-09 PROCEDURE — 74177 CT ABD & PELVIS W/CONTRAST: CPT

## 2022-07-09 PROCEDURE — 2580000003 HC RX 258: Performed by: EMERGENCY MEDICINE

## 2022-07-09 RX ORDER — 0.9 % SODIUM CHLORIDE 0.9 %
1000 INTRAVENOUS SOLUTION INTRAVENOUS ONCE
Status: COMPLETED | OUTPATIENT
Start: 2022-07-09 | End: 2022-07-09

## 2022-07-09 RX ORDER — OXYCODONE HYDROCHLORIDE AND ACETAMINOPHEN 5; 325 MG/1; MG/1
1 TABLET ORAL ONCE
Status: COMPLETED | OUTPATIENT
Start: 2022-07-09 | End: 2022-07-09

## 2022-07-09 RX ADMIN — SODIUM CHLORIDE 1000 ML: 9 INJECTION, SOLUTION INTRAVENOUS at 16:36

## 2022-07-09 RX ADMIN — IOPAMIDOL 75 ML: 755 INJECTION, SOLUTION INTRAVENOUS at 17:41

## 2022-07-09 RX ADMIN — OXYCODONE AND ACETAMINOPHEN 1 TABLET: 5; 325 TABLET ORAL at 16:35

## 2022-07-09 ASSESSMENT — PAIN SCALES - GENERAL: PAINLEVEL_OUTOF10: 9

## 2022-07-09 ASSESSMENT — ENCOUNTER SYMPTOMS
CHEST TIGHTNESS: 0
BACK PAIN: 1
ABDOMINAL PAIN: 1
SHORTNESS OF BREATH: 0

## 2022-07-09 NOTE — ED NOTES
Family notified of pt's d/c and waiting for family to arrive to get pt     Malik Griffin RN  07/09/22 [id number]

## 2022-07-09 NOTE — ED PROVIDER NOTES
74 yo female with chonic abd pain related to a known hernia with prior repair and chronic wound. No fevers, chills, no n/v/d. Also has leg pain b/l and lower back pain. No distress, no sob, no cp, no dyspnea. Chronically on O2 at home. Ongoing problems, moderate severioty, persistent, assoc with chronic wound and prior surgery. The history is provided by the patient. Family History   Problem Relation Age of Onset    Diabetes Mother     Arthritis Mother     High Blood Pressure Mother     Arthritis Father     High Blood Pressure Father     Diabetes Father     High Blood Pressure Sister     Alcohol Abuse Sister     Substance Abuse Brother     Substance Abuse Sister     Coronary Art Dis Neg Hx     Breast Cancer Neg Hx     Ovarian Cancer Neg Hx      Past Surgical History:   Procedure Laterality Date    ABDOMEN SURGERY  2008    ischemic colitis    COLONOSCOPY  2008    healed colitis    COLONOSCOPY  04/11/2014    COLONOSCOPY  11/23/2015    severe colitis    COLONOSCOPY  10/24/2016    COLONOSCOPY  07/26/2017    ECHO COMPL W DOP COLOR FLOW  8/16/2015         ECHO COMPLETE  4/22/2013         FRACTURE SURGERY  2010    Tibial right    HEMORRHOID SURGERY  12/08/2016    KYPHOSIS SURGERY      For comp. fx T10    LUMBAR DISC SURGERY      OTHER SURGICAL HISTORY  11/1/11    removal r leg external fixator    OTHER SURGICAL HISTORY  12/14/2021    Partial Vaginectomy, Endometrial Biopsy    SIGMOIDOSCOPY N/A 3/19/2021    SIGMOIDOSCOPY HEMORRHOID BANDING performed by Isabelle Page MD at [facility]      application TSF  5/5/38    UPPER GASTROINTESTINAL ENDOSCOPY  1/4/2016    UPPER GASTROINTESTINAL ENDOSCOPY  07/26/2017       Review of Systems   Constitutional: Negative for chills and fever. Respiratory: Negative for chest tightness and shortness of breath. Cardiovascular: Negative for chest pain. Gastrointestinal: Positive for abdominal pain. obstructive pulmonary disease) ([address] Debility, Deformity of ankle and foot, acquired, Depression, Diabetes insipidus ([address] Diverticulitis, Dry eyes, Encephalopathy acute, Gait disturbance, GERD (gastroesophageal reflux disease), Hemorrhoids, Hernia, History of blood transfusion, History of Clostridium difficile, Hyperlipidemia, Hypothyroidism, Ischemic colitis, enteritis, or enterocolitis ([address] Long-term current use of steroids, Lumbar disc herniation, Myalgia and myositis, unspecified, Nephrosclerosis, Nonunion of fracture, Osteoarthritis, PAF (paroxysmal atrial fibrillation) ([address] 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[address] Pulmonary hypertension ([address] Pulmonary sarcoidosis ([address] Rectal bleeding, Rhabdomyolysis, Steroid-induced avascular necrosis of shoulder ([address] and Tibia fracture. Past Surgical History:  has a past surgical history that includes fracture surgery (2010); Kyphosis surgery; Lumbar disc surgery; Tibia / fibia lengthening; other surgical history (11/1/11); Abdomen surgery (2008); Echo Complete (4/22/2013); ECHO Compl W Dop Color Flow (8/16/2015); Upper gastrointestinal endoscopy (1/4/2016); Hemorrhoid surgery (12/08/2016); Colonoscopy (2008); Colonoscopy (04/11/2014); Colonoscopy (11/23/2015); Colonoscopy (10/24/2016); Colonoscopy (07/26/2017); Upper gastrointestinal endoscopy (07/26/2017); sigmoidoscopy (N/A, 3/19/2021); and other surgical history (12/14/2021). Social History:  reports that she quit smoking about 25 years ago. Her smoking use included cigarettes. She started smoking about 51 years ago. She has a 18.75 pack-year smoking history. She has never used smokeless tobacco. She reports current drug use. She reports that she does not drink alcohol. Family History: family history includes Alcohol Abuse in her sister; Arthritis in her father and mother; Diabetes in her father and mother; High Blood Pressure in her father, mother, and sister; Substance Abuse in her brother and sister. The patients home medications have been reviewed. Allergies: Patient has no known allergies.     -------------------------------------------------- RESULTS -------------------------------------------------  Labs:  Results for orders placed or performed during the hospital encounter of 07/09/22   CBC with Auto Differential   Result Value Ref Range    WBC 4.9 4.5 - 11.5 E9/L    RBC 3.75 3.50 - 5.50 E12/L    Hemoglobin 9.4 (L) 11.5 - 15.5 g/dL    Hematocrit 32.2 (L) 34.0 - 48.0 %    MCV 85.9 80.0 - 99.9 fL    MCH 25.1 (L) 26.0 - 35.0 pg    MCHC 29.2 (L) 32.0 - 34.5 %    RDW 14.2 11.5 - 15.0 fL    Platelets 649 343 - 572 E9/L    MPV 11.0 7.0 - 12.0 fL    Neutrophils % 65.9 43.0 - 80.0 %    Immature Granulocytes % 0.2 0.0 - 5.0 %    Lymphocytes % 14.4 (L) 20.0 - 42.0 %    Monocytes % 13.8 (H) 2.0 - 12.0 %    Eosinophils % 5.3 0.0 - 6.0 %    Basophils % 0.4 0.0 - 2.0 %    Neutrophils Absolute 3.20 1.80 - 7.30 E9/L    Immature Granulocytes # 0.01 E9/L    Lymphocytes Absolute 0.70 (L) 1.50 - 4.00 E9/L    Monocytes Absolute 0.67 0.10 - 0.95 E9/L    Eosinophils Absolute 0.26 0.05 - 0.50 E9/L    Basophils Absolute 0.02 0.00 - 0.20 E9/L   Comprehensive Metabolic Panel   Result Value Ref Range    Sodium 144 132 - 146 mmol/L    Potassium 4.0 3.5 - 5.0 mmol/L    Chloride 99 98 - 107 mmol/L    CO2 34 (H) 22 - 29 mmol/L    Anion Gap 11 7 - 16 mmol/L    Glucose 79 74 - 99 mg/dL    BUN 24 (H) 6 - 23 mg/dL    CREATININE 1.4 (H) 0.5 - 1.0 mg/dL    GFR Non-African American 46 >=60 mL/min/1.73    GFR African American 46     Calcium 10.0 8.6 - 10.2 mg/dL    Total Protein 6.4 6.4 - 8.3 g/dL    Albumin 3.6 3.5 - 5.2 g/dL    Total Bilirubin 0.6 0.0 - 1.2 mg/dL    Alkaline Phosphatase 74 35 - 104 U/L    ALT 10 0 - 32 U/L    AST 18 0 - 31 U/L   Lipase   Result Value Ref Range    Lipase 10 (L) 13 - 60 U/L       Radiology:  CT ABDOMEN PELVIS W IV CONTRAST Additional Contrast? None   Final Result   Large ventral hernia with herniation of bowel loops without incarceration. Diverticulosis of colon with thickening of the rectosigmoid colon. Proctitis   is considered. Consider colonoscopy. Emphysema with the bronchiectasis/infiltrates in the right middle lobe and   left lower lobe concerning for pneumonia. RECOMMENDATIONS:   Unavailable             ------------------------- NURSING NOTES AND VITALS REVIEWED ---------------------------  Date / Time Roomed:  7/9/2022  3:12 PM  ED Bed Assignment:  [id number]-[id number]    The nursing notes within the ED encounter and vital signs as below have been reviewed. /72   Pulse 99   Temp 98.1 °F (36.7 °C) (Oral)   Resp 17   Ht 5' (1.524 m)   Wt 177 lb (80.3 kg)   LMP  (LMP Unknown)   SpO2 93%   BMI 34.57 kg/m²   Oxygen Saturation Interpretation: Normal      ------------------------------------------ PROGRESS NOTES ------------------------------------------  I have spoken with the patient and discussed todays results, in addition to providing specific details for the plan of care and counseling regarding the diagnosis and prognosis. Their questions are answered at this time and they are agreeable with the plan. I discussed at length with them reasons for immediate return here for re evaluation. They will followup with primary care by calling their office tomorrow. --------------------------------- ADDITIONAL PROVIDER NOTES ---------------------------------  At this time the patient is without objective evidence of an acute process requiring hospitalization or inpatient management. They have remained hemodynamically stable throughout their entire ED visit and are stable for discharge with outpatient follow-up. The plan has been discussed in detail and they are aware of the specific conditions for emergent return, as well as the importance of follow-up. Discharge Medication List as of 7/9/2022  7:13 PM          Diagnosis:  1.  Generalized abdominal pain Disposition:  Patient's disposition: Discharge to home  Patient's condition is stable.           Marc Yap DO  07/10/22 [id number]

## 2022-07-10 ENCOUNTER — HOSPITAL ENCOUNTER (INPATIENT)
Age: 67
LOS: 2 days | Discharge: HOME HEALTH | DRG: 194 | End: 2022-07-12
Attending: EMERGENCY MEDICINE | Admitting: INTERNAL MEDICINE
Payer: MEDICARE

## 2022-07-10 DIAGNOSIS — R10.9 ABDOMINAL PAIN, UNSPECIFIED ABDOMINAL LOCATION: ICD-10-CM

## 2022-07-10 DIAGNOSIS — K62.89 PROCTITIS: ICD-10-CM

## 2022-07-10 DIAGNOSIS — J18.9 PNEUMONIA DUE TO INFECTIOUS ORGANISM, UNSPECIFIED LATERALITY, UNSPECIFIED PART OF LUNG: Primary | ICD-10-CM

## 2022-07-10 PROBLEM — R41.0 CONFUSION: Status: ACTIVE | Noted: 2022-07-10

## 2022-07-10 PROBLEM — Z79.52 LONG TERM (CURRENT) USE OF SYSTEMIC STEROIDS: Status: ACTIVE | Noted: 2022-07-10

## 2022-07-10 PROBLEM — K43.9 VENTRAL HERNIA WITHOUT OBSTRUCTION OR GANGRENE: Status: ACTIVE | Noted: 2022-07-10

## 2022-07-10 LAB
ACETAMINOPHEN LEVEL: <5 MCG/ML (ref 10–30)
AMPHETAMINE SCREEN, URINE: NOT DETECTED
ANION GAP SERPL CALCULATED.3IONS-SCNC: 14 MMOL/L (ref 7–16)
BARBITURATE SCREEN URINE: NOT DETECTED
BASOPHILS ABSOLUTE: 0.02 E9/L (ref 0–0.2)
BASOPHILS RELATIVE PERCENT: 0.3 % (ref 0–2)
BENZODIAZEPINE SCREEN, URINE: NOT DETECTED
BUN BLDV-MCNC: 29 MG/DL (ref 6–23)
CALCIUM SERPL-MCNC: 10.2 MG/DL (ref 8.6–10.2)
CANNABINOID SCREEN URINE: NOT DETECTED
CHLORIDE BLD-SCNC: 95 MMOL/L (ref 98–107)
CO2: 30 MMOL/L (ref 22–29)
COCAINE METABOLITE SCREEN URINE: NOT DETECTED
CREAT SERPL-MCNC: 1.8 MG/DL (ref 0.5–1)
EOSINOPHILS ABSOLUTE: 0.01 E9/L (ref 0.05–0.5)
EOSINOPHILS RELATIVE PERCENT: 0.2 % (ref 0–6)
ETHANOL: <10 MG/DL (ref 0–0.08)
FENTANYL SCREEN, URINE: NOT DETECTED
GFR AFRICAN AMERICAN: 34
GFR NON-AFRICAN AMERICAN: 34 ML/MIN/1.73
GLUCOSE BLD-MCNC: 107 MG/DL (ref 74–99)
HCT VFR BLD CALC: 31 % (ref 34–48)
HEMOGLOBIN: 9.1 G/DL (ref 11.5–15.5)
HYPOCHROMIA: ABNORMAL
IMMATURE GRANULOCYTES #: 0.02 E9/L
IMMATURE GRANULOCYTES %: 0.3 % (ref 0–5)
LACTIC ACID: 2.1 MMOL/L (ref 0.5–2.2)
LYMPHOCYTES ABSOLUTE: 0.56 E9/L (ref 1.5–4)
LYMPHOCYTES RELATIVE PERCENT: 9.6 % (ref 20–42)
Lab: ABNORMAL
MAGNESIUM: 2 MG/DL (ref 1.6–2.6)
MCH RBC QN AUTO: 25.9 PG (ref 26–35)
MCHC RBC AUTO-ENTMCNC: 29.4 % (ref 32–34.5)
MCV RBC AUTO: 88.3 FL (ref 80–99.9)
METHADONE SCREEN, URINE: NOT DETECTED
MONOCYTES ABSOLUTE: 0.41 E9/L (ref 0.1–0.95)
MONOCYTES RELATIVE PERCENT: 7 % (ref 2–12)
NEUTROPHILS ABSOLUTE: 4.83 E9/L (ref 1.8–7.3)
NEUTROPHILS RELATIVE PERCENT: 82.6 % (ref 43–80)
OPIATE SCREEN URINE: POSITIVE
OXYCODONE URINE: POSITIVE
PDW BLD-RTO: 14.4 FL (ref 11.5–15)
PHENCYCLIDINE SCREEN URINE: NOT DETECTED
PLATELET # BLD: 123 E9/L (ref 130–450)
PMV BLD AUTO: 11.8 FL (ref 7–12)
POIKILOCYTES: ABNORMAL
POLYCHROMASIA: ABNORMAL
POTASSIUM REFLEX MAGNESIUM: 4.1 MMOL/L (ref 3.5–5)
RBC # BLD: 3.51 E12/L (ref 3.5–5.5)
SALICYLATE, SERUM: <0.3 MG/DL (ref 0–30)
SARS-COV-2, NAAT: NOT DETECTED
SCHISTOCYTES: ABNORMAL
SODIUM BLD-SCNC: 139 MMOL/L (ref 132–146)
TARGET CELLS: ABNORMAL
TRICYCLIC ANTIDEPRESSANTS SCREEN SERUM: NEGATIVE NG/ML
WBC # BLD: 5.9 E9/L (ref 4.5–11.5)

## 2022-07-10 PROCEDURE — 83735 ASSAY OF MAGNESIUM: CPT

## 2022-07-10 PROCEDURE — 6360000002 HC RX W HCPCS: Performed by: FAMILY MEDICINE

## 2022-07-10 PROCEDURE — 80048 BASIC METABOLIC PNL TOTAL CA: CPT

## 2022-07-10 PROCEDURE — 80143 DRUG ASSAY ACETAMINOPHEN: CPT

## 2022-07-10 PROCEDURE — 85025 COMPLETE CBC W/AUTO DIFF WBC: CPT

## 2022-07-10 PROCEDURE — 83605 ASSAY OF LACTIC ACID: CPT

## 2022-07-10 PROCEDURE — 36415 COLL VENOUS BLD VENIPUNCTURE: CPT

## 2022-07-10 PROCEDURE — 94640 AIRWAY INHALATION TREATMENT: CPT

## 2022-07-10 PROCEDURE — 99222 1ST HOSP IP/OBS MODERATE 55: CPT | Performed by: FAMILY MEDICINE

## 2022-07-10 PROCEDURE — 80307 DRUG TEST PRSMV CHEM ANLYZR: CPT

## 2022-07-10 PROCEDURE — 2580000003 HC RX 258: Performed by: FAMILY MEDICINE

## 2022-07-10 PROCEDURE — 96375 TX/PRO/DX INJ NEW DRUG ADDON: CPT

## 2022-07-10 PROCEDURE — 1200000000 HC SEMI PRIVATE

## 2022-07-10 PROCEDURE — 87635 SARS-COV-2 COVID-19 AMP PRB: CPT

## 2022-07-10 PROCEDURE — 87040 BLOOD CULTURE FOR BACTERIA: CPT

## 2022-07-10 PROCEDURE — 87449 NOS EACH ORGANISM AG IA: CPT

## 2022-07-10 PROCEDURE — 94664 DEMO&/EVAL PT USE INHALER: CPT

## 2022-07-10 PROCEDURE — 6360000002 HC RX W HCPCS: Performed by: STUDENT IN AN ORGANIZED HEALTH CARE EDUCATION/TRAINING PROGRAM

## 2022-07-10 PROCEDURE — 82077 ASSAY SPEC XCP UR&BREATH IA: CPT

## 2022-07-10 PROCEDURE — 99285 EMERGENCY DEPT VISIT HI MDM: CPT

## 2022-07-10 PROCEDURE — 6370000000 HC RX 637 (ALT 250 FOR IP): Performed by: FAMILY MEDICINE

## 2022-07-10 PROCEDURE — 80179 DRUG ASSAY SALICYLATE: CPT

## 2022-07-10 PROCEDURE — 96365 THER/PROPH/DIAG IV INF INIT: CPT

## 2022-07-10 PROCEDURE — 2580000003 HC RX 258: Performed by: STUDENT IN AN ORGANIZED HEALTH CARE EDUCATION/TRAINING PROGRAM

## 2022-07-10 RX ORDER — SODIUM CHLORIDE 0.9 % (FLUSH) 0.9 %
5-40 SYRINGE (ML) INJECTION PRN
Status: DISCONTINUED | OUTPATIENT
Start: 2022-07-10 | End: 2022-07-12 | Stop reason: HOSPADM

## 2022-07-10 RX ORDER — ARFORMOTEROL TARTRATE 15 UG/2ML
15 SOLUTION RESPIRATORY (INHALATION) 2 TIMES DAILY
Status: DISCONTINUED | OUTPATIENT
Start: 2022-07-10 | End: 2022-07-12 | Stop reason: HOSPADM

## 2022-07-10 RX ORDER — SODIUM CHLORIDE 9 MG/ML
INJECTION, SOLUTION INTRAVENOUS PRN
Status: DISCONTINUED | OUTPATIENT
Start: 2022-07-10 | End: 2022-07-12 | Stop reason: HOSPADM

## 2022-07-10 RX ORDER — IPRATROPIUM BROMIDE AND ALBUTEROL SULFATE 2.5; .5 MG/3ML; MG/3ML
1 SOLUTION RESPIRATORY (INHALATION)
Status: DISCONTINUED | OUTPATIENT
Start: 2022-07-10 | End: 2022-07-12 | Stop reason: HOSPADM

## 2022-07-10 RX ORDER — DOCUSATE SODIUM 100 MG/1
100 CAPSULE, LIQUID FILLED ORAL 2 TIMES DAILY
Status: DISCONTINUED | OUTPATIENT
Start: 2022-07-10 | End: 2022-07-12 | Stop reason: HOSPADM

## 2022-07-10 RX ORDER — LIDOCAINE 4 G/G
1 PATCH TOPICAL DAILY
Status: DISCONTINUED | OUTPATIENT
Start: 2022-07-10 | End: 2022-07-12 | Stop reason: HOSPADM

## 2022-07-10 RX ORDER — ACETAMINOPHEN 325 MG/1
650 TABLET ORAL EVERY 6 HOURS PRN
Status: DISCONTINUED | OUTPATIENT
Start: 2022-07-10 | End: 2022-07-12 | Stop reason: HOSPADM

## 2022-07-10 RX ORDER — AMLODIPINE BESYLATE 5 MG/1
5 TABLET ORAL NIGHTLY
Status: DISCONTINUED | OUTPATIENT
Start: 2022-07-10 | End: 2022-07-12 | Stop reason: HOSPADM

## 2022-07-10 RX ORDER — POLYETHYLENE GLYCOL 3350 17 G/17G
17 POWDER, FOR SOLUTION ORAL DAILY PRN
Status: DISCONTINUED | OUTPATIENT
Start: 2022-07-10 | End: 2022-07-12 | Stop reason: HOSPADM

## 2022-07-10 RX ORDER — FERROUS SULFATE 325(65) MG
325 TABLET ORAL 2 TIMES DAILY
Status: DISCONTINUED | OUTPATIENT
Start: 2022-07-10 | End: 2022-07-12 | Stop reason: HOSPADM

## 2022-07-10 RX ORDER — FAMOTIDINE 20 MG/1
20 TABLET, FILM COATED ORAL 2 TIMES DAILY
Status: DISCONTINUED | OUTPATIENT
Start: 2022-07-10 | End: 2022-07-12 | Stop reason: HOSPADM

## 2022-07-10 RX ORDER — DESMOPRESSIN ACETATE 0.1 MG/1
200 TABLET ORAL 2 TIMES DAILY
Status: DISCONTINUED | OUTPATIENT
Start: 2022-07-10 | End: 2022-07-12 | Stop reason: HOSPADM

## 2022-07-10 RX ORDER — POLYVINYL ALCOHOL 14 MG/ML
1 SOLUTION/ DROPS OPHTHALMIC EVERY 12 HOURS
Status: DISCONTINUED | OUTPATIENT
Start: 2022-07-10 | End: 2022-07-12 | Stop reason: HOSPADM

## 2022-07-10 RX ORDER — HYDROCODONE BITARTRATE AND ACETAMINOPHEN 5; 325 MG/1; MG/1
1 TABLET ORAL ONCE
Status: DISCONTINUED | OUTPATIENT
Start: 2022-07-10 | End: 2022-07-10

## 2022-07-10 RX ORDER — LEVOFLOXACIN 5 MG/ML
750 INJECTION, SOLUTION INTRAVENOUS ONCE
Status: COMPLETED | OUTPATIENT
Start: 2022-07-10 | End: 2022-07-10

## 2022-07-10 RX ORDER — ONDANSETRON 2 MG/ML
4 INJECTION INTRAMUSCULAR; INTRAVENOUS EVERY 6 HOURS PRN
Status: DISCONTINUED | OUTPATIENT
Start: 2022-07-10 | End: 2022-07-12 | Stop reason: HOSPADM

## 2022-07-10 RX ORDER — OMEGA-3-ACID ETHYL ESTERS 1 G/1
1 CAPSULE, LIQUID FILLED ORAL 2 TIMES DAILY
Status: DISCONTINUED | OUTPATIENT
Start: 2022-07-10 | End: 2022-07-12 | Stop reason: HOSPADM

## 2022-07-10 RX ORDER — 0.9 % SODIUM CHLORIDE 0.9 %
1000 INTRAVENOUS SOLUTION INTRAVENOUS ONCE
Status: COMPLETED | OUTPATIENT
Start: 2022-07-10 | End: 2022-07-10

## 2022-07-10 RX ORDER — PREDNISONE 1 MG/1
5 TABLET ORAL DAILY
Status: DISCONTINUED | OUTPATIENT
Start: 2022-07-10 | End: 2022-07-12 | Stop reason: HOSPADM

## 2022-07-10 RX ORDER — METOPROLOL SUCCINATE 100 MG/1
100 TABLET, EXTENDED RELEASE ORAL DAILY
Status: DISCONTINUED | OUTPATIENT
Start: 2022-07-10 | End: 2022-07-10 | Stop reason: DRUGHIGH

## 2022-07-10 RX ORDER — ACETAMINOPHEN 650 MG/1
650 SUPPOSITORY RECTAL EVERY 6 HOURS PRN
Status: DISCONTINUED | OUTPATIENT
Start: 2022-07-10 | End: 2022-07-12 | Stop reason: HOSPADM

## 2022-07-10 RX ORDER — POTASSIUM CHLORIDE 7.45 MG/ML
10 INJECTION INTRAVENOUS PRN
Status: DISCONTINUED | OUTPATIENT
Start: 2022-07-10 | End: 2022-07-12 | Stop reason: HOSPADM

## 2022-07-10 RX ORDER — FLUTICASONE FUROATE AND VILANTEROL 100; 25 UG/1; UG/1
1 POWDER RESPIRATORY (INHALATION) DAILY
Status: DISCONTINUED | OUTPATIENT
Start: 2022-07-10 | End: 2022-07-10 | Stop reason: CLARIF

## 2022-07-10 RX ORDER — LEVOFLOXACIN 5 MG/ML
750 INJECTION, SOLUTION INTRAVENOUS EVERY 24 HOURS
Status: DISCONTINUED | OUTPATIENT
Start: 2022-07-11 | End: 2022-07-11 | Stop reason: DRUGHIGH

## 2022-07-10 RX ORDER — ENOXAPARIN SODIUM 100 MG/ML
30 INJECTION SUBCUTANEOUS DAILY
Status: DISCONTINUED | OUTPATIENT
Start: 2022-07-10 | End: 2022-07-10 | Stop reason: ALTCHOICE

## 2022-07-10 RX ORDER — ONDANSETRON 4 MG/1
4 TABLET, ORALLY DISINTEGRATING ORAL EVERY 8 HOURS PRN
Status: DISCONTINUED | OUTPATIENT
Start: 2022-07-10 | End: 2022-07-12 | Stop reason: HOSPADM

## 2022-07-10 RX ORDER — METOPROLOL SUCCINATE 50 MG/1
150 TABLET, EXTENDED RELEASE ORAL DAILY
Status: DISCONTINUED | OUTPATIENT
Start: 2022-07-10 | End: 2022-07-12 | Stop reason: HOSPADM

## 2022-07-10 RX ORDER — POTASSIUM CHLORIDE 20 MEQ/1
40 TABLET, EXTENDED RELEASE ORAL PRN
Status: DISCONTINUED | OUTPATIENT
Start: 2022-07-10 | End: 2022-07-12 | Stop reason: HOSPADM

## 2022-07-10 RX ORDER — FENTANYL CITRATE 50 UG/ML
50 INJECTION, SOLUTION INTRAMUSCULAR; INTRAVENOUS ONCE
Status: DISCONTINUED | OUTPATIENT
Start: 2022-07-10 | End: 2022-07-10

## 2022-07-10 RX ORDER — OXYCODONE HYDROCHLORIDE AND ACETAMINOPHEN 5; 325 MG/1; MG/1
2 TABLET ORAL EVERY 4 HOURS PRN
Status: DISCONTINUED | OUTPATIENT
Start: 2022-07-10 | End: 2022-07-12 | Stop reason: HOSPADM

## 2022-07-10 RX ORDER — SODIUM CHLORIDE 9 MG/ML
INJECTION, SOLUTION INTRAVENOUS CONTINUOUS
Status: DISCONTINUED | OUTPATIENT
Start: 2022-07-10 | End: 2022-07-12 | Stop reason: HOSPADM

## 2022-07-10 RX ORDER — OXYCODONE HYDROCHLORIDE AND ACETAMINOPHEN 5; 325 MG/1; MG/1
1 TABLET ORAL EVERY 4 HOURS PRN
Status: DISCONTINUED | OUTPATIENT
Start: 2022-07-10 | End: 2022-07-12 | Stop reason: HOSPADM

## 2022-07-10 RX ORDER — LEVOTHYROXINE SODIUM 0.07 MG/1
75 TABLET ORAL DAILY
Status: DISCONTINUED | OUTPATIENT
Start: 2022-07-11 | End: 2022-07-12 | Stop reason: HOSPADM

## 2022-07-10 RX ORDER — BUDESONIDE 0.25 MG/2ML
250 INHALANT ORAL 2 TIMES DAILY
Status: DISCONTINUED | OUTPATIENT
Start: 2022-07-10 | End: 2022-07-12 | Stop reason: HOSPADM

## 2022-07-10 RX ORDER — 0.9 % SODIUM CHLORIDE 0.9 %
1000 INTRAVENOUS SOLUTION INTRAVENOUS ONCE
Status: DISCONTINUED | OUTPATIENT
Start: 2022-07-10 | End: 2022-07-10

## 2022-07-10 RX ORDER — ESCITALOPRAM OXALATE 10 MG/1
10 TABLET ORAL 2 TIMES DAILY
Status: DISCONTINUED | OUTPATIENT
Start: 2022-07-10 | End: 2022-07-12 | Stop reason: HOSPADM

## 2022-07-10 RX ORDER — SODIUM CHLORIDE 0.9 % (FLUSH) 0.9 %
5-40 SYRINGE (ML) INJECTION EVERY 12 HOURS SCHEDULED
Status: DISCONTINUED | OUTPATIENT
Start: 2022-07-10 | End: 2022-07-12 | Stop reason: HOSPADM

## 2022-07-10 RX ADMIN — OXYCODONE AND ACETAMINOPHEN 1 TABLET: 5; 325 TABLET ORAL at 21:42

## 2022-07-10 RX ADMIN — IPRATROPIUM BROMIDE AND ALBUTEROL SULFATE 1 AMPULE: 2.5; .5 SOLUTION RESPIRATORY (INHALATION) at 20:51

## 2022-07-10 RX ADMIN — SODIUM CHLORIDE: 9 INJECTION, SOLUTION INTRAVENOUS at 21:34

## 2022-07-10 RX ADMIN — DESMOPRESSIN ACETATE 200 MCG: 0.1 TABLET ORAL at 21:23

## 2022-07-10 RX ADMIN — ESCITALOPRAM OXALATE 10 MG: 10 TABLET ORAL at 21:23

## 2022-07-10 RX ADMIN — OMEGA-3-ACID ETHYL ESTERS 1 G: 1 CAPSULE, LIQUID FILLED ORAL at 21:23

## 2022-07-10 RX ADMIN — LEVOFLOXACIN 750 MG: 5 INJECTION, SOLUTION INTRAVENOUS at 12:36

## 2022-07-10 RX ADMIN — SODIUM CHLORIDE 1000 ML: 9 INJECTION, SOLUTION INTRAVENOUS at 10:54

## 2022-07-10 RX ADMIN — FAMOTIDINE 20 MG: 20 TABLET ORAL at 21:25

## 2022-07-10 RX ADMIN — BUDESONIDE 250 MCG: 0.25 SUSPENSION RESPIRATORY (INHALATION) at 20:51

## 2022-07-10 RX ADMIN — HYDROMORPHONE HYDROCHLORIDE 0.5 MG: 1 INJECTION, SOLUTION INTRAMUSCULAR; INTRAVENOUS; SUBCUTANEOUS at 10:54

## 2022-07-10 RX ADMIN — AMLODIPINE BESYLATE 5 MG: 5 TABLET ORAL at 21:25

## 2022-07-10 RX ADMIN — FERROUS SULFATE TAB 325 MG (65 MG ELEMENTAL FE) 325 MG: 325 (65 FE) TAB at 21:23

## 2022-07-10 RX ADMIN — POLYVINYL ALCOHOL 1 DROP: 14 SOLUTION/ DROPS OPHTHALMIC at 21:22

## 2022-07-10 RX ADMIN — SODIUM CHLORIDE, PRESERVATIVE FREE 10 ML: 5 INJECTION INTRAVENOUS at 21:25

## 2022-07-10 RX ADMIN — DOCUSATE SODIUM 100 MG: 100 CAPSULE, LIQUID FILLED ORAL at 21:23

## 2022-07-10 RX ADMIN — ARFORMOTEROL TARTRATE 15 MCG: 15 SOLUTION RESPIRATORY (INHALATION) at 20:51

## 2022-07-10 ASSESSMENT — ENCOUNTER SYMPTOMS
DIARRHEA: 0
CONSTIPATION: 0
COLOR CHANGE: 0
ABDOMINAL PAIN: 0
ABDOMINAL DISTENTION: 0
SORE THROAT: 0
PHOTOPHOBIA: 0
EYE PAIN: 0
CHEST TIGHTNESS: 0
SHORTNESS OF BREATH: 0
NAUSEA: 0
VOMITING: 0
COUGH: 0
BACK PAIN: 0
RHINORRHEA: 0
BLOOD IN STOOL: 0

## 2022-07-10 ASSESSMENT — PAIN SCALES - GENERAL
PAINLEVEL_OUTOF10: 3
PAINLEVEL_OUTOF10: 10
PAINLEVEL_OUTOF10: 0
PAINLEVEL_OUTOF10: 10
PAINLEVEL_OUTOF10: 10
PAINLEVEL_OUTOF10: 9

## 2022-07-10 ASSESSMENT — PAIN DESCRIPTION - LOCATION: LOCATION: BACK

## 2022-07-10 ASSESSMENT — PAIN DESCRIPTION - DESCRIPTORS: DESCRIPTORS: ACHING

## 2022-07-10 ASSESSMENT — PAIN - FUNCTIONAL ASSESSMENT
PAIN_FUNCTIONAL_ASSESSMENT: 0-10
PAIN_FUNCTIONAL_ASSESSMENT: PREVENTS OR INTERFERES SOME ACTIVE ACTIVITIES AND ADLS

## 2022-07-10 NOTE — ED PROVIDER NOTES
Name: Ny Galaviz   MRN: [id number]     --------------------------------------------- History of Present Illness ---------------------------------------------  7/10/22, Time: 8:18 AM EDT   Chief Complaint   Patient presents with    Other     Pt states widespread body pain. Acute on chronic      HPI    Ny Galaviz is a 73 y.o. female, with hx of DM, sarcoidosis, Pulmonary HTN, HLD, CKD, MDD, paroxysmal a-fib, hypothyroidism, abdominal hernia, on chronic oxygen NC, who presents to the ED today for body aches and pain in the legs, which began 2 days ago. Pt was seen yesterday at [facility] for abdominal pain and was discharged home this morning after workup, she states she went home for a few hours and continued to have pain in her legs. Pt describes pain as constant, moderate, crampy, no exacerbating or relieving factors. Pt states she took one of her home percocets this morning which did not help. The pt denies any associated fever, lightheadedness, dizziness, HA, n/v, chest pain, shortness of breath, abd pain, GI or  complaints. Allg: Patient has no known allergies.    PCP: Maryan Meyer MD.    Meds:   Current Facility-Administered Medications:     HYDROmorphone (DILAUDID) injection 0.5 mg, 0.5 mg, IntraVENous, Once, Charlie Sepulveda DO    amLODIPine (NORVASC) tablet 5 mg, 5 mg, Oral, Nightly, Charlie Sepulveda DO, 5 mg at 07/10/22 2125    apixaban (ELIQUIS) tablet 5 mg, 5 mg, Oral, BID, Charlie Sepulveda DO    polyvinyl alcohol (LIQUIFILM TEARS) 1.4 % ophthalmic solution 1 drop, 1 drop, Both Eyes, Q12H, Charlie Sepulveda DO, 1 drop at 07/10/22 2122    desmopressin (DDAVP) tablet 200 mcg, 200 mcg, Oral, BID, Charlie Sepulveda DO, 200 mcg at 07/10/22 2123    docusate sodium (COLACE) capsule 100 mg, 100 mg, Oral, BID, Charlie Sepulveda DO, 100 mg at 07/10/22 2123    escitalopram (LEXAPRO) tablet 10 mg, 10 mg, Oral, BID, Charlie Sepulveda DO, 10 mg at 07/10/22 2123    famotidine (PEPCID) tablet 20 mg, 20 mg, Oral, BID, Charlie Sepulveda, DO, 20 mg at 07/10/22 2125    ferrous sulfate (IRON 325) tablet 325 mg, 325 mg, Oral, BID, Charlie Sepulveda, DO, 325 mg at 07/10/22 2123    [START ON 7/11/2022] levothyroxine (SYNTHROID) tablet 75 mcg, 75 mcg, Oral, Daily, Charlie Sepulveda, DO    lidocaine 4 % external patch 1 patch, 1 patch, Topical, Daily, Charlie Sepulveda, DO    metoprolol succinate (TOPROL XL) extended release tablet 150 mg, 150 mg, Oral, Daily, Charlie Sepulveda, DO    omega-3 acid ethyl esters (LOVAZA) capsule 1 g, 1 g, Oral, BID, Charlie Sepulveda, DO, 1 g at 07/10/22 2123    sodium chloride (OCEAN, BABY AYR) 0.65 % nasal spray 1 spray, 1 spray, Nasal, Q3H PRN, Charlie Sepulveda, DO    sodium chloride flush 0.9 % injection 5-40 mL, 5-40 mL, IntraVENous, 2 times per day, Charlie Sepulveda DO, 10 mL at 07/10/22 2125    sodium chloride flush 0.9 % injection 5-40 mL, 5-40 mL, IntraVENous, PRN, Charlie Sepulveda, DO    0.9 % sodium chloride infusion, , IntraVENous, PRN, Charlie Sepulveda, DO    ondansetron (ZOFRAN-ODT) disintegrating tablet 4 mg, 4 mg, Oral, Q8H PRN **OR** ondansetron (ZOFRAN) injection 4 mg, 4 mg, IntraVENous, Q6H PRN, Charlie Sepulveda, DO    polyethylene glycol (GLYCOLAX) packet 17 g, 17 g, Oral, Daily PRN, Charlie Sepulveda, DO    acetaminophen (TYLENOL) tablet 650 mg, 650 mg, Oral, Q6H PRN **OR** acetaminophen (TYLENOL) suppository 650 mg, 650 mg, Rectal, Q6H PRN, Charlie Sepulveda, DO    0.9 % sodium chloride infusion, , IntraVENous, Continuous, Charlie Sepulveda, DO, Last Rate: 100 mL/hr at 07/10/22 2134, New Bag at 07/10/22 2134    potassium chloride (KLOR-CON M) extended release tablet 40 mEq, 40 mEq, Oral, PRN **OR** potassium bicarb-citric acid (EFFER-K) effervescent tablet 40 mEq, 40 mEq, Oral, PRN **OR** potassium chloride 10 mEq/100 mL IVPB (Peripheral Line), 10 mEq, IntraVENous, PRN, Charlie Sepulveda DO    oxyCODONE-acetaminophen (PERCOCET) 5-325 MG per tablet 1 tablet, 1 tablet, Oral, Q4H PRN, 1 tablet at 07/10/22 2142 **OR** oxyCODONE-acetaminophen (PERCOCET) 5-325 MG per tablet 2 tablet, 2 tablet, Oral, Q4H PRN, Charlie Sepulveda DO    HYDROmorphone (DILAUDID) injection 0.25 mg, 0.25 mg, IntraVENous, Q3H PRN **OR** HYDROmorphone (DILAUDID) injection 0.5 mg, 0.5 mg, IntraVENous, Q3H PRN, Charlie Sepulveda DO    ipratropium-albuterol (DUONEB) nebulizer solution 1 ampule, 1 ampule, Inhalation, Q4H WA, Charlie Sepulveda DO, 1 ampule at 07/10/22 2051    predniSONE (DELTASONE) tablet 5 mg, 5 mg, Oral, Daily, Charlie Sepulveda DO    [START ON 7/11/2022] levoFLOXacin (LEVAQUIN) 750 MG/150ML infusion 750 mg, 750 mg, IntraVENous, Q24H, Charlie Sepulveda DO    budesonide (PULMICORT) nebulizer suspension 250 mcg, 250 mcg, Nebulization, BID, Charlie Sepulveda DO, 250 mcg at 07/10/22 2051    Arformoterol Tartrate (BROVANA) nebulizer solution 15 mcg, 15 mcg, Nebulization, BID, Charlie Sepulveda DO, 15 mcg at 07/10/22 2051     Review of Systems   Constitutional: Negative for chills, fatigue and fever. HENT: Negative for congestion, rhinorrhea and sore throat. Eyes: Negative for photophobia, pain and visual disturbance. Respiratory: Negative for cough, chest tightness and shortness of breath. Cardiovascular: Negative for chest pain, palpitations and leg swelling. Gastrointestinal: Negative for abdominal distention, abdominal pain, blood in stool, constipation, diarrhea, nausea and vomiting. Genitourinary: Negative for difficulty urinating, dysuria, hematuria, vaginal bleeding and vaginal discharge. Musculoskeletal: Positive for arthralgias (knees) and myalgias (legs, crampy). Negative for back pain, neck pain and neck stiffness. Skin: Negative for color change, rash and wound. Neurological: Negative for dizziness, syncope, light-headedness and headaches. Psychiatric/Behavioral: Negative for agitation, behavioral problems and confusion. Physical Exam  Constitutional:       General: She is not in acute distress. Appearance: Normal appearance. She is obese. She is not ill-appearing, toxic-appearing or diaphoretic. HENT:      Head: Normocephalic and atraumatic. Right Ear: External ear normal.      Left Ear: External ear normal.      Nose: Nose normal. No rhinorrhea. Mouth/Throat:      Pharynx: Oropharynx is clear. Eyes:      General: No scleral icterus. Right eye: No discharge. Left eye: No discharge. Extraocular Movements: Extraocular movements intact. Conjunctiva/sclera: Conjunctivae normal.      Pupils: Pupils are equal, round, and reactive to light. Cardiovascular:      Rate and Rhythm: Normal rate and regular rhythm. Pulses: Normal pulses. Pulmonary:      Effort: Pulmonary effort is normal. No respiratory distress. Breath sounds: Normal breath sounds. No stridor. Abdominal:      General: Abdomen is flat. There is no distension. Palpations: Abdomen is soft. Tenderness: There is no abdominal tenderness. There is no guarding. Musculoskeletal:         General: No swelling or tenderness. Normal range of motion. Cervical back: Normal range of motion. Right lower leg: No edema. Left lower leg: No edema. Skin:     General: Skin is warm and dry. Capillary Refill: Capillary refill takes less than 2 seconds. Coloration: Skin is not jaundiced. Findings: Lesion (mid abd wound) present. No erythema or rash. Comments: Surgical scars on abdomen, right knee  Wound on abdomen with dry dressing   Neurological:      General: No focal deficit present. Mental Status: She is alert and oriented to person, place, and time.    Psychiatric:         Mood and Affect: Mood normal.         Behavior: Behavior normal.          Procedures     MDM  Number of Diagnoses or Management Options  Abdominal pain, unspecified abdominal location  Pneumonia due to infectious organism, unspecified laterality, unspecified part of lung  Proctitis  Diagnosis management comments: Mrs. Trevin Johnson is a 72 y/o F pt who presents today for crampy bilateral leg pains x 2 days. Pt just was discharged from [facility] this morning. Pt states she got home and her leg pains continued. Pt alert and oriented, vitals WNL. No distress. Bilateral leg pains - No weakness or sensory deficits. Neurovasc intact LE. Electrolytes balanced. Fluids given. 0.5 dilaudid given for pain. Community acquired pneumonia - Seen on CT 7/9/22. Pt denies any SOB, states has chronic cough with productive sputum, chronically on oxygen 4L via NC, denies any fevers. O2 sat WNL on her home O2. No leukocytosis. Levaquin initiated to cover for CAP and proctitis. Blood Cx pending. Proctitis / Colitis - Appreciated on CT 7/9/22. Pt has been having intermittent abdominal pains, possibly related to colitis as well as her chronic abdominal wound. Blood Cx pending. Treatment as above. Chronic normocytic anemia in setting of CKD - anemia stable from previous lab work    Delorise Sieving with pt about results, recommended admission to hospital for further care and she was amenable to plan. Spoke with Dr. Chantell Ricardo who will admit pt under Dr. Michael De for further care.         Amount and/or Complexity of Data Reviewed  Decide to obtain previous medical records or to obtain history from someone other than the patient: yes           --------------------------------------------- PAST HISTORY ---------------------------------------------  Past Medical History:  has a past medical history of A-fib ([facility] [address] Able to transfer from chair to wheelchair, Abnormal mammogram, Acute on chronic respiratory failure ([address] Anemia, Anemia due to chronic illness, Ankle fracture, left, 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[facility] [address] Backache, Benign hypertension, CHF (congestive heart failure) (HCC), Chronic back pain, Chronic kidney disease, Chronic pain disorder, Chronic, continuous use of opioids, Compression fracture of thoracic vertebra ([facility] [address] COPD (chronic obstructive pulmonary disease) ([address] Debility, Deformity of ankle and foot, acquired, Depression, Diabetes insipidus ([address] Diverticulitis, Dry eyes, Encephalopathy acute, Gait disturbance, GERD (gastroesophageal reflux disease), Hemorrhoids, Hernia, History of blood transfusion, History of Clostridium difficile, Hyperlipidemia, Hypothyroidism, Ischemic colitis, enteritis, or enterocolitis ([address] Long term (current) use of systemic steroids, Long-term current use of steroids, Lumbar disc herniation, Myalgia and myositis, unspecified, Nephrosclerosis, Nonunion of fracture, Osteoarthritis, PAF (paroxysmal atrial fibrillation) ([address] 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[address] Pulmonary hypertension ([address] Pulmonary sarcoidosis ([address] Rectal bleeding, Rhabdomyolysis, Steroid-induced avascular necrosis of shoulder ([address] Tibia fracture, and Ventral hernia without obstruction or gangrene. Past Surgical History:  has a past surgical history that includes fracture surgery (2010); Kyphosis surgery; Lumbar disc surgery; Tibia / fibia lengthening; other surgical history (11/1/11); Abdomen surgery (2008); Echo Complete (4/22/2013); ECHO Compl W Dop Color Flow (8/16/2015); Upper gastrointestinal endoscopy (1/4/2016); Hemorrhoid surgery (12/08/2016); Colonoscopy (2008); Colonoscopy (04/11/2014); Colonoscopy (11/23/2015); Colonoscopy (10/24/2016); Colonoscopy (07/26/2017); Upper gastrointestinal endoscopy (07/26/2017); sigmoidoscopy (N/A, 3/19/2021); and other surgical history (12/14/2021). Social History:  reports that she quit smoking about 25 years ago. Her smoking use included cigarettes. She started smoking about 51 years ago. She has a 18.75 pack-year smoking history. She has never used smokeless tobacco. She reports current drug use. She reports that she does not drink alcohol. Family History: family history includes Alcohol Abuse in her sister;  Arthritis in her father and mother; Diabetes in her father and Screen   Result Value Ref Range    Ethanol Lvl <10 mg/dL    Acetaminophen Level <5.0 (L) 10.0 - 06.3 mcg/mL    Salicylate, Serum <2.1 0.0 - 30.0 mg/dL    TCA Scrn NEGATIVE Cutoff:300 ng/mL   Magnesium   Result Value Ref Range    Magnesium 2.0 1.6 - 2.6 mg/dL   Lactic Acid   Result Value Ref Range    Lactic Acid 2.1 0.5 - 2.2 mmol/L       RADIOLOGY:  No orders to display         ------------------------- NURSING NOTES AND VITALS REVIEWED ---------------------------  Date / Time Roomed:  7/10/2022  7:43 AM  ED Bed Assignment:  [id number]    The nursing notes within the ED encounter and vital signs as below have been reviewed. Patient Vitals for the past 8 hrs:   BP Temp Temp src Pulse Resp SpO2   07/10/22 2142 -- -- -- -- 16 --   07/10/22 2015 119/79 98.8 °F (37.1 °C) Oral (!) 101 18 96 %   07/10/22 1515 129/79 97.6 °F (36.4 °C) Oral 78 18 96 %       Oxygen Saturation Interpretation: Normal    ------------------------------------------ED COURSE & MEDS GIVEN ---------------------------------------------------  ED Course as of 07/10/22 2147   Sun Jul 10, 2022   1043 Pt complaining of abdominal pain. Lactic ordered. 1 L fluids ordered. Fentanyl ordered. [PW]   1102 Hemoglobin Quant(!): 9.1  Chronic anemia, stable. [PW]   1102 Creatinine(!): 1.8  CKD, elevated from baseline however. Fluids ordered.   [PW]      ED Course User Index  [PW] Robbie Ha DO        Medications   HYDROmorphone (DILAUDID) injection 0.5 mg (0.5 mg IntraVENous Not Given 7/10/22 1817)   amLODIPine (NORVASC) tablet 5 mg (5 mg Oral Given 7/10/22 2125)   apixaban (ELIQUIS) tablet 5 mg (5 mg Oral Not Given 7/10/22 2126)   polyvinyl alcohol (LIQUIFILM TEARS) 1.4 % ophthalmic solution 1 drop (1 drop Both Eyes Given 7/10/22 2122)   desmopressin (DDAVP) tablet 200 mcg (200 mcg Oral Given 7/10/22 2123)   docusate sodium (COLACE) capsule 100 mg (100 mg Oral Given 7/10/22 2123)   escitalopram (LEXAPRO) tablet 10 mg (10 mg Oral Given 7/10/22 2123) famotidine (PEPCID) tablet 20 mg (20 mg Oral Given 7/10/22 2125)   ferrous sulfate (IRON 325) tablet 325 mg (325 mg Oral Given 7/10/22 2123)   levothyroxine (SYNTHROID) tablet 75 mcg (has no administration in time range)   lidocaine 4 % external patch 1 patch (1 patch Topical Not Given 7/10/22 2128)   metoprolol succinate (TOPROL XL) extended release tablet 150 mg (150 mg Oral Not Given 7/10/22 2124)   omega-3 acid ethyl esters (LOVAZA) capsule 1 g (1 g Oral Given 7/10/22 2123)   sodium chloride (OCEAN, BABY AYR) 0.65 % nasal spray 1 spray (has no administration in time range)   sodium chloride flush 0.9 % injection 5-40 mL (10 mLs IntraVENous Given 7/10/22 2125)   sodium chloride flush 0.9 % injection 5-40 mL (has no administration in time range)   0.9 % sodium chloride infusion (has no administration in time range)   ondansetron (ZOFRAN-ODT) disintegrating tablet 4 mg (has no administration in time range)     Or   ondansetron (ZOFRAN) injection 4 mg (has no administration in time range)   polyethylene glycol (GLYCOLAX) packet 17 g (has no administration in time range)   acetaminophen (TYLENOL) tablet 650 mg (has no administration in time range)     Or   acetaminophen (TYLENOL) suppository 650 mg (has no administration in time range)   0.9 % sodium chloride infusion ( IntraVENous New Bag 7/10/22 2134)   potassium chloride (KLOR-CON M) extended release tablet 40 mEq (has no administration in time range)     Or   potassium bicarb-citric acid (EFFER-K) effervescent tablet 40 mEq (has no administration in time range)     Or   potassium chloride 10 mEq/100 mL IVPB (Peripheral Line) (has no administration in time range)   oxyCODONE-acetaminophen (PERCOCET) 5-325 MG per tablet 1 tablet (1 tablet Oral Given 7/10/22 2142)     Or   oxyCODONE-acetaminophen (PERCOCET) 5-325 MG per tablet 2 tablet ( Oral See Alternative 7/10/22 2142)   HYDROmorphone (DILAUDID) injection 0.25 mg (has no administration in time range)     Or HYDROmorphone (DILAUDID) injection 0.5 mg (has no administration in time range)   ipratropium-albuterol (DUONEB) nebulizer solution 1 ampule (1 ampule Inhalation Given 7/10/22 2051)   predniSONE (DELTASONE) tablet 5 mg (5 mg Oral Not Given 7/10/22 2124)   levoFLOXacin (LEVAQUIN) 750 MG/150ML infusion 750 mg (has no administration in time range)   budesonide (PULMICORT) nebulizer suspension 250 mcg (250 mcg Nebulization Given 7/10/22 2051)   Arformoterol Tartrate (BROVANA) nebulizer solution 15 mcg (15 mcg Nebulization Given 7/10/22 2051)   0.9 % sodium chloride bolus (0 mLs IntraVENous Stopped 7/10/22 1236)   HYDROmorphone (DILAUDID) injection 0.5 mg (0.5 mg IntraVENous Given 7/10/22 1054)   levoFLOXacin (LEVAQUIN) 750 MG/150ML infusion 750 mg (0 mg IntraVENous Stopped 7/10/22 1400)       ------------------------------------------ PROGRESS NOTES ------------------------------------------    Counseling:  I have spoken with the patient and discussed todays results, in addition to providing specific details for the plan of care and counseling regarding the diagnosis and prognosis. Their questions are answered at this time and they are agreeable with the plan of admission.    --------------------------------- ADDITIONAL PROVIDER NOTES ---------------------------------    Consultations:  Time: 1330. Spoke with Dr. Carlos A Ratliff, hospitalist, will admit pt under Dr. Susan Dinero. Discussed case. They will admit the patient. This patient's ED course included: a personal history and physicial examination, re-evaluation prior to disposition, multiple bedside re-evaluations, IV medications and complex medical decision making and emergency management    This patient has remained hemodynamically stable during their ED course. Diagnosis:  1. Pneumonia due to infectious organism, unspecified laterality, unspecified part of lung    2. Abdominal pain, unspecified abdominal location    3.  Proctitis        Disposition:  Patient's disposition: Admit to med/surg floor  Patient's condition is fair. Shavon Manrique DO      *NOTE: This report was transcribed using voice recognition software. Every effort was made to ensure accuracy; however, inadvertent computerized transcription errors may be present.          Shavon Manrique DO  Resident  07/10/22 [id number]

## 2022-07-10 NOTE — Clinical Note
Discharge Plan[de-identified] Other/[facility])   Telemetry/Cardiac Monitoring Required?: No

## 2022-07-10 NOTE — H&P
[facility] History and Physical          ASSESSMENT:      Principal Problem:    Pneumonia  Active Problems:    Ventral hernia without obstruction or gangrene    Long term (current) use of systemic steroids    Confusion    Chronic back pain    Primary hypothyroidism    Diabetes insipidus, neurohypophyseal (HCC)    Sarcoidosis    Chronic pain syndrome    Chronic kidney disease, stage III (moderate) ([facility])    PAF (paroxysmal atrial fibrillation) ([facility]) currently in NSR    Mixed simple and mucopurulent chronic bronchitis (HCC)    Chronic, continuous use of opioids  Resolved Problems:    * No resolved hospital problems. *      PLAN:    1. Pneumonia  -CT scan shows bronchiectasis with concern for pneumonia of bilateral lower lobes  -Started on Levaquin 750 mg every 24 hours  -Continue with duo nebs  -Strep and Legionella urine sent  -Procalcitonin ordered  -Oxygen therapy as needed    2. CKD most likely secondary to recent CAT scan with IV contrast  -Creatinine 1.8 we will continue to monitor  -IV fluid at 100 cc/h    3. Anemia of chronic disease  -Hemoglobin is 9.1 currently at her baseline  -No signs of bleeding at present    4. Thrombocytopenia  -Platelets at 183, will check daily CBC and monitor for signs of bleeding  -Consider consultation to hematology if worsening    5. Confusion  -May be secondary to pneumonia versus pain medication  -If no improvement will consider CT scan, last CT of the head was done in 2019 which showed chronic periventricular microangiopathy  -States that the memory issues have been worsening over the last several months  -Cultures pending    6. Hiatal hernia with chronic abdominal pain/wound  -Consult general surgery  -CT shows ventral hernia without signs of obstruction at this time  -Consult wound care    7.   Chronic bronchitis with pulmonary sarcoid  -Continue with DuoNebs and daily prednisone  -Consider pulmonary consult if no improvement in the next several days    8. Chronic opiate use may be causing some of the confusion as noted above. Chart review shows that the patient continues to ask for more pain medication. Her next refill is due on the 13th and she may have presented for pain medication only. -UDS pending    9. Diabetes insipidus continue with DDAVP and monitoring of electrolytes        Code Status: Full  DVT prophylaxis: Lovenox      CHIEF COMPLAINT: Chronic pain    History of Present Illness: Patient is a 71-year-old -American female with history of diabetes insipidus, pulmonary sarcoidosis, hypertension, hyperlipidemia, chronic kidney disease, depression, paroxysmal A. fib, hypothyroidism, ventral hernia, and chronic opiate use who presents today to the emergency department for complaints of chronic pain particularly to the bilateral lower extremities. Patient does endorse that she has a motorized scooter at home and a walker. States that she does not usually walk very much but states that the pain has been worsening over the last week. Denies any recent falls or injury. Patient was discharged from [facility] yesterday for similar complaints. She did have a CT scan of the abdomen pelvis which as noted below. When questioned the patient is a very hard time keeping her complaints straight and cannot tell me pacifically why she presented to the hospital.  She states that her memory issues are going on for quite some time and states that her  would be able to provide more information.     Informant(s) for H&P: Patient and electronic medical record    REVIEW OF SYSTEMS:  A comprehensive review of systems was negative except for: what is in the HPI      PMH:  Past Medical History:   Diagnosis Date    A-fib [facility])     follows with Dr Rannie Mohs to transfer from chair to wheelchair     with assistance    Abnormal mammogram 2010    Acute on chronic respiratory failure ([facility] 05/05/2017    Anemia     Anemia due to chronic illness  Ankle fracture, left 2008    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[address] 03/26/2007    Backache     Benign hypertension     CHF (congestive heart failure) ([facility])     Chronic back pain     Chronic kidney disease     stage 3    Chronic pain disorder     Chronic, continuous use of opioids     Compression fracture of thoracic vertebra ([facility])     T10    COPD (chronic obstructive pulmonary disease) ([facility])     Debility     Deformity of ankle and foot, acquired 01/31/2011    Depression     Diabetes insipidus ([address]     Diverticulitis     Dry eyes     Encephalopathy acute 05/05/2017    Gait disturbance     GERD (gastroesophageal reflux disease)     Hemorrhoids 01/13/2012    Hernia     History of blood transfusion approx 05/2017    History of Clostridium difficile     negative culture 12-15-15; resolved    Hyperlipidemia     Hypothyroidism     Ischemic colitis, enteritis, or enterocolitis ([address]     Long term (current) use of systemic steroids 7/10/2022    Long-term current use of steroids     Lumbar disc herniation     Myalgia and myositis, unspecified     Nephrosclerosis     Nonunion of fracture 02/22/2011    Osteoarthritis     severe    PAF (paroxysmal atrial fibrillation) ([facility])     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[facility])     PCWP mean:26.0 PA mean: 24.00; denies any recent issues    Pulmonary hypertension ([facility])     ventricular diastolic function consistent with abnormal relaxation (stage I)    Pulmonary sarcoidosis (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 fx T10    LUMBAR DISC SURGERY      OTHER SURGICAL HISTORY  11/1/11    removal r leg external fixator    OTHER SURGICAL HISTORY  12/14/2021    Partial Vaginectomy, Endometrial Biopsy    SIGMOIDOSCOPY N/A 3/19/2021    SIGMOIDOSCOPY HEMORRHOID BANDING performed by Isabelle Page MD at [facility]      application TSF  0/7/98    UPPER GASTROINTESTINAL ENDOSCOPY  1/4/2016    UPPER GASTROINTESTINAL ENDOSCOPY  07/26/2017       Medications Prior to Admission:    Prior to Admission medications    Medication Sig Start Date End Date Taking? Authorizing Provider   promethazine-codeine (PHENERGAN WITH CODEINE) 6.25-10 MG/5ML syrup Take 5 mLs by mouth 4 times daily as needed for Cough for up to 60 days.  7/8/22 9/6/22 Yes Shun Harrington MD   metoprolol succinate (TOPROL XL) 100 MG extended release tablet Take 1 tablet by mouth daily 7/8/22  Yes Shun Harrington MD   metoprolol succinate (TOPROL XL) 50 MG extended release tablet TAKE ONE TABLET EVERY DAY WITH THE 100MG 7/8/22  Yes Shun Harrington MD   levothyroxine (SYNTHROID) 75 MCG tablet Take 1 tablet by mouth daily 7/8/22  Yes Shun Harrington MD   escitalopram (LEXAPRO) 10 MG tablet Take 1 tablet by mouth 2 times daily 7/8/22 8/7/22 Yes Shun Harrington MD   famotidine (PEPCID) 20 MG tablet Take 1 tablet by mouth 2 times daily 7/8/22 8/7/22 Yes Shun Harrington MD   ferrous sulfate (IRON 325) 325 (65 Fe) MG tablet Take 1 tablet by mouth 2 times daily 7/8/22  Yes Shun Harrington MD   amLODIPine (NORVASC) 5 MG tablet Take 1 tablet by mouth nightly 7/8/22  Yes Shun Harrington MD   predniSONE (DELTASONE) 5 MG tablet Take 1 tablet by mouth daily 7/8/22  Yes Shun Harrington MD   cycloSPORINE (RESTASIS) 0.05 % ophthalmic emulsion Place 1 drop into both eyes every 12 hours 7/8/22  Yes Ortiz Anand MD   oxyCODONE-acetaminophen (PERCOCET) 5-325 MG per tablet Take 1 tablet by mouth every 4 hours as needed for Pain (max: 150/month) for up to 30 days.  7/13/22 8/12/22 Yes Ortiz Anand MD   lidocaine (LIDODERM) 5 % Place 1 patch onto the skin daily for 10 days 12 hours on, 12 hours off. 7/8/22 7/18/22 Yes Ortiz Anand MD   desmopressin (DDAVP) 0.1 mg tablet Take 2 tablets by mouth 2 times daily 6/13/22 9/11/22 Yes Ortiz Anand MD   docusate sodium (COLACE) 100 mg capsule Take 1 capsule by mouth 2 times daily 6/10/22  Yes Ortiz Anand MD   albuterol sulfate HFA (PROVENTIL HFA) 108 (90 Base) MCG/ACT inhaler Inhale 2 puffs into the lungs every 6 hours as needed for Wheezing or Shortness of Breath 5/17/22  Yes Ortiz Anand MD   omega-3 acid ethyl esters (LOVAZA) 1 g capsule Take 1 capsule by mouth 2 times daily 4/27/22  Yes Ortiz Anand MD   BREO ELLIPTA 100-25 MCG/INH AEPB inhaler Inhale 1 puff into the lungs daily 3/17/22  Yes Ortiz Anand MD   [place] 3 1000 MG CAPS Take 1 each by mouth daily 3/9/22  Yes Ortiz Anand MD   albuterol (PROVENTIL) (2.5 MG/3ML) 0.083% nebulizer solution Take 3 mLs by nebulization every 6 hours as needed for Wheezing or Shortness of Breath 2/19/22  Yes Mariah Mendoza,    naloxone 4 MG/0.1ML LIQD nasal spray 1 spray by Nasal route as needed for Opioid Reversal 12/14/21  Yes Yair Murphy MD   apixaban (ELIQUIS) 5 MG TABS tablet Take 1 tablet by mouth 2 times daily 11/4/21  Yes Lakeshia Partida MD   mupirocin [facility]) 2 % ointment Apply topically daily 11/4/21  Yes Lakeshia Partida MD   Respiratory Therapy Supplies (NEBULIZER/TUBING/MOUTHPIECE) KIT 1 kit by Does not apply route daily as needed (wheezing) 4/2/21  Yes Lakeshia Partida MD   hydrocortisone (ANUSOL-HC) 2.5 % CREA rectal cream Apply to affected area BID 10/13/20  Yes Lakeshia Partida MD   sodium chloride (ALTAMIST SPRAY) 0.65 % nasal spray 1 spray by Nasal route as needed for Congestion 8/6/20  Yes Moreen Krabbe,    OXYGEN 4 L/min by Nasal route as needed. BMS   Yes Historical Provider, MD   mupirocin Jeanine Valdivia) 2 % ointment Apply topically daily 6/8/20   Xochitl Tirado MD       Allergies:    Patient has no known allergies. Social History:    reports that she quit smoking about 25 years ago. Her smoking use included cigarettes. She started smoking about 51 years ago. She has a 18.75 pack-year smoking history. She has never used smokeless tobacco. She reports current drug use. She reports that she does not drink alcohol. Family History:   family history includes Alcohol Abuse in her sister; Arthritis in her father and mother; Diabetes in her father and mother; High Blood Pressure in her father, mother, and sister; Substance Abuse in her brother and sister. PHYSICAL EXAM:  Vitals:  /79   Pulse 74   Temp 97.6 °F (36.4 °C) (Oral)   Resp 14   Ht 5' (1.524 m)   Wt 170 lb (77.1 kg)   LMP  (LMP Unknown)   SpO2 96%   BMI 33.20 kg/m²     General Appearance: Alert to person and place. Does require numerous redirects to answer questions. Does appear very somnolent and confused during exam.  Does also have repetitive/abnormal movements during exam.  Skin: warm and dry  Head: normocephalic and atraumatic  Eyes: pupils equal, round, and reactive to light, extraocular eye movements intact, conjunctivae normal  Neck: neck supple and non tender without mass   Pulmonary/Chest: Decreased breath sounds in all fields bilaterally with mild bibasilar expiratory wheezes. Cardiovascular: normal rate, normal S1 and S2 and no carotid bruits  Abdomen: She has midline abdominal wound with ventral hernia. Bowel sounds in all 4 quadrants. Mild tenderness to palpation but no guarding or rebound. Extremities: She does have deformities noted to bilateral ankles. No open wounds noted.   Neurologic: As noted above patient is able to move all extremities equally. Does appear very hypersomnolent and confused on exam.        LABS:  Recent Labs     07/09/22  1620 07/10/22  0953    139   K 4.0 4.1   CL 99 95*   CO2 34* 30*   BUN 24* 29*   CREATININE 1.4* 1.8*   GLUCOSE 79 107*   CALCIUM 10.0 10.2       Recent Labs     07/09/22  1620 07/10/22  0953   WBC 4.9 5.9   RBC 3.75 3.51   HGB 9.4* 9.1*   HCT 32.2* 31.0*   MCV 85.9 88.3   MCH 25.1* 25.9*   MCHC 29.2* 29.4*   RDW 14.2 14.4    123*   MPV 11.0 11.8       No results for input(s): POCGLU in the last 72 hours.     CBC with Differential:    Lab Results   Component Value Date/Time    WBC 5.9 07/10/2022 09:53 AM    RBC 3.51 07/10/2022 09:53 AM    RBC 3.57 12/14/2021 09:57 AM    HGB 9.1 07/10/2022 09:53 AM    HCT 31.0 07/10/2022 09:53 AM     07/10/2022 09:53 AM    MCV 88.3 07/10/2022 09:53 AM    MCH 25.9 07/10/2022 09:53 AM    MCHC 29.4 07/10/2022 09:53 AM    RDW 14.4 07/10/2022 09:53 AM    NRBC 0.9 05/06/2018 05:07 AM    SEGSPCT 77 01/15/2014 04:00 PM    BANDSPCT 1 06/25/2014 05:45 AM    LYMPHOPCT 9.6 07/10/2022 09:53 AM    LYMPHOPCT 14.5 12/14/2021 09:57 AM    MONOPCT 7.0 07/10/2022 09:53 AM    MYELOPCT 0.9 03/01/2021 04:39 PM    BASOPCT 0.3 07/10/2022 09:53 AM    MONOSABS 0.41 07/10/2022 09:53 AM    LYMPHSABS 0.56 07/10/2022 09:53 AM    EOSABS 0.01 07/10/2022 09:53 AM    BASOSABS 0.02 07/10/2022 09:53 AM     Hemoglobin/Hematocrit:    Lab Results   Component Value Date/Time    HGB 9.1 07/10/2022 09:53 AM    HCT 31.0 07/10/2022 09:53 AM     Magnesium:    Lab Results   Component Value Date/Time    MG 2.0 07/10/2022 09:53 AM     Phosphorus:    Lab Results   Component Value Date/Time    PHOS 3.4 03/01/2021 04:39 PM       Radiology:   No orders to display     Large ventral hernia with herniation of bowel loops without incarceration.       Diverticulosis of colon with thickening of the rectosigmoid colon.  Proctitis   is considered.  Consider colonoscopy.       Emphysema with the bronchiectasis/infiltrates in the right middle lobe and   left lower lobe concerning for pneumonia. NOTE: This report was transcribed using voice recognition software. Every effort was made to ensure accuracy; however, inadvertent computerized transcription errors may be present.   Electronically signed by Loki Beltran DO on 7/10/2022 at 4:03 PM

## 2022-07-11 LAB
ANION GAP SERPL CALCULATED.3IONS-SCNC: 12 MMOL/L (ref 7–16)
BASOPHILS ABSOLUTE: 0.01 E9/L (ref 0–0.2)
BASOPHILS RELATIVE PERCENT: 0.3 % (ref 0–2)
BUN BLDV-MCNC: 22 MG/DL (ref 6–23)
CALCIUM SERPL-MCNC: 8.9 MG/DL (ref 8.6–10.2)
CHLORIDE BLD-SCNC: 101 MMOL/L (ref 98–107)
CO2: 30 MMOL/L (ref 22–29)
CREAT SERPL-MCNC: 1.3 MG/DL (ref 0.5–1)
EOSINOPHILS ABSOLUTE: 0.21 E9/L (ref 0.05–0.5)
EOSINOPHILS RELATIVE PERCENT: 6 % (ref 0–6)
GFR AFRICAN AMERICAN: 50
GFR NON-AFRICAN AMERICAN: 50 ML/MIN/1.73
GLUCOSE BLD-MCNC: 85 MG/DL (ref 74–99)
HCT VFR BLD CALC: 27.6 % (ref 34–48)
HEMOGLOBIN: 8 G/DL (ref 11.5–15.5)
HYPOCHROMIA: ABNORMAL
IMMATURE GRANULOCYTES #: 0.01 E9/L
IMMATURE GRANULOCYTES %: 0.3 % (ref 0–5)
L. PNEUMOPHILA SEROGP 1 UR AG: NORMAL
LYMPHOCYTES ABSOLUTE: 0.48 E9/L (ref 1.5–4)
LYMPHOCYTES RELATIVE PERCENT: 13.8 % (ref 20–42)
MAGNESIUM: 1.8 MG/DL (ref 1.6–2.6)
MCH RBC QN AUTO: 25.7 PG (ref 26–35)
MCHC RBC AUTO-ENTMCNC: 29 % (ref 32–34.5)
MCV RBC AUTO: 88.7 FL (ref 80–99.9)
MONOCYTES ABSOLUTE: 0.54 E9/L (ref 0.1–0.95)
MONOCYTES RELATIVE PERCENT: 15.5 % (ref 2–12)
NEUTROPHILS ABSOLUTE: 2.24 E9/L (ref 1.8–7.3)
NEUTROPHILS RELATIVE PERCENT: 64.1 % (ref 43–80)
PDW BLD-RTO: 14.3 FL (ref 11.5–15)
PLATELET # BLD: 127 E9/L (ref 130–450)
PMV BLD AUTO: 12.4 FL (ref 7–12)
POLYCHROMASIA: ABNORMAL
POTASSIUM REFLEX MAGNESIUM: 3.3 MMOL/L (ref 3.5–5)
PROCALCITONIN: 0.14 NG/ML (ref 0–0.08)
RBC # BLD: 3.11 E12/L (ref 3.5–5.5)
SODIUM BLD-SCNC: 143 MMOL/L (ref 132–146)
STREP PNEUMONIAE ANTIGEN, URINE: NORMAL
WBC # BLD: 3.5 E9/L (ref 4.5–11.5)

## 2022-07-11 PROCEDURE — 85025 COMPLETE CBC W/AUTO DIFF WBC: CPT

## 2022-07-11 PROCEDURE — 84145 PROCALCITONIN (PCT): CPT

## 2022-07-11 PROCEDURE — 99233 SBSQ HOSP IP/OBS HIGH 50: CPT | Performed by: INTERNAL MEDICINE

## 2022-07-11 PROCEDURE — 6370000000 HC RX 637 (ALT 250 FOR IP): Performed by: FAMILY MEDICINE

## 2022-07-11 PROCEDURE — 6360000002 HC RX W HCPCS: Performed by: FAMILY MEDICINE

## 2022-07-11 PROCEDURE — 83735 ASSAY OF MAGNESIUM: CPT

## 2022-07-11 PROCEDURE — 99222 1ST HOSP IP/OBS MODERATE 55: CPT | Performed by: SURGERY

## 2022-07-11 PROCEDURE — 80048 BASIC METABOLIC PNL TOTAL CA: CPT

## 2022-07-11 PROCEDURE — 1200000000 HC SEMI PRIVATE

## 2022-07-11 PROCEDURE — 36415 COLL VENOUS BLD VENIPUNCTURE: CPT

## 2022-07-11 PROCEDURE — 2580000003 HC RX 258: Performed by: FAMILY MEDICINE

## 2022-07-11 PROCEDURE — 94640 AIRWAY INHALATION TREATMENT: CPT

## 2022-07-11 RX ORDER — LEVOFLOXACIN 5 MG/ML
750 INJECTION, SOLUTION INTRAVENOUS
Status: DISCONTINUED | OUTPATIENT
Start: 2022-07-12 | End: 2022-07-12 | Stop reason: HOSPADM

## 2022-07-11 RX ADMIN — ARFORMOTEROL TARTRATE 15 MCG: 15 SOLUTION RESPIRATORY (INHALATION) at 09:02

## 2022-07-11 RX ADMIN — POLYVINYL ALCOHOL 1 DROP: 14 SOLUTION/ DROPS OPHTHALMIC at 17:56

## 2022-07-11 RX ADMIN — OXYCODONE AND ACETAMINOPHEN 2 TABLET: 5; 325 TABLET ORAL at 15:14

## 2022-07-11 RX ADMIN — FERROUS SULFATE TAB 325 MG (65 MG ELEMENTAL FE) 325 MG: 325 (65 FE) TAB at 08:26

## 2022-07-11 RX ADMIN — OXYCODONE AND ACETAMINOPHEN 1 TABLET: 5; 325 TABLET ORAL at 03:00

## 2022-07-11 RX ADMIN — SODIUM CHLORIDE: 9 INJECTION, SOLUTION INTRAVENOUS at 18:05

## 2022-07-11 RX ADMIN — OXYCODONE AND ACETAMINOPHEN 1 TABLET: 5; 325 TABLET ORAL at 08:39

## 2022-07-11 RX ADMIN — APIXABAN 5 MG: 5 TABLET, FILM COATED ORAL at 08:27

## 2022-07-11 RX ADMIN — OMEGA-3-ACID ETHYL ESTERS 1 G: 1 CAPSULE, LIQUID FILLED ORAL at 21:32

## 2022-07-11 RX ADMIN — ESCITALOPRAM OXALATE 10 MG: 10 TABLET ORAL at 21:32

## 2022-07-11 RX ADMIN — PREDNISONE 5 MG: 5 TABLET ORAL at 08:27

## 2022-07-11 RX ADMIN — FERROUS SULFATE TAB 325 MG (65 MG ELEMENTAL FE) 325 MG: 325 (65 FE) TAB at 21:32

## 2022-07-11 RX ADMIN — ARFORMOTEROL TARTRATE 15 MCG: 15 SOLUTION RESPIRATORY (INHALATION) at 19:50

## 2022-07-11 RX ADMIN — METOPROLOL SUCCINATE 150 MG: 50 TABLET, EXTENDED RELEASE ORAL at 08:39

## 2022-07-11 RX ADMIN — OMEGA-3-ACID ETHYL ESTERS 1 G: 1 CAPSULE, LIQUID FILLED ORAL at 08:26

## 2022-07-11 RX ADMIN — BUDESONIDE 250 MCG: 0.25 SUSPENSION RESPIRATORY (INHALATION) at 09:02

## 2022-07-11 RX ADMIN — IPRATROPIUM BROMIDE AND ALBUTEROL SULFATE 1 AMPULE: 2.5; .5 SOLUTION RESPIRATORY (INHALATION) at 19:50

## 2022-07-11 RX ADMIN — POLYVINYL ALCOHOL 1 DROP: 14 SOLUTION/ DROPS OPHTHALMIC at 06:43

## 2022-07-11 RX ADMIN — DESMOPRESSIN ACETATE 200 MCG: 0.1 TABLET ORAL at 21:32

## 2022-07-11 RX ADMIN — DESMOPRESSIN ACETATE 200 MCG: 0.1 TABLET ORAL at 08:26

## 2022-07-11 RX ADMIN — IPRATROPIUM BROMIDE AND ALBUTEROL SULFATE 1 AMPULE: 2.5; .5 SOLUTION RESPIRATORY (INHALATION) at 16:33

## 2022-07-11 RX ADMIN — APIXABAN 5 MG: 5 TABLET, FILM COATED ORAL at 21:32

## 2022-07-11 RX ADMIN — POTASSIUM CHLORIDE 40 MEQ: 1500 TABLET, EXTENDED RELEASE ORAL at 21:33

## 2022-07-11 RX ADMIN — DOCUSATE SODIUM 100 MG: 100 CAPSULE, LIQUID FILLED ORAL at 08:26

## 2022-07-11 RX ADMIN — SODIUM CHLORIDE: 9 INJECTION, SOLUTION INTRAVENOUS at 07:38

## 2022-07-11 RX ADMIN — IPRATROPIUM BROMIDE AND ALBUTEROL SULFATE 1 AMPULE: 2.5; .5 SOLUTION RESPIRATORY (INHALATION) at 09:02

## 2022-07-11 RX ADMIN — SALINE NASAL SPRAY 1 SPRAY: 1.5 SOLUTION NASAL at 15:03

## 2022-07-11 RX ADMIN — DOCUSATE SODIUM 100 MG: 100 CAPSULE, LIQUID FILLED ORAL at 21:32

## 2022-07-11 RX ADMIN — OXYCODONE AND ACETAMINOPHEN 1 TABLET: 5; 325 TABLET ORAL at 21:41

## 2022-07-11 RX ADMIN — FAMOTIDINE 20 MG: 20 TABLET ORAL at 08:27

## 2022-07-11 RX ADMIN — LEVOTHYROXINE SODIUM 75 MCG: 75 TABLET ORAL at 05:19

## 2022-07-11 RX ADMIN — BUDESONIDE 250 MCG: 0.25 SUSPENSION RESPIRATORY (INHALATION) at 19:50

## 2022-07-11 RX ADMIN — ESCITALOPRAM OXALATE 10 MG: 10 TABLET ORAL at 08:27

## 2022-07-11 RX ADMIN — IPRATROPIUM BROMIDE AND ALBUTEROL SULFATE 1 AMPULE: 2.5; .5 SOLUTION RESPIRATORY (INHALATION) at 12:58

## 2022-07-11 RX ADMIN — FAMOTIDINE 20 MG: 20 TABLET ORAL at 21:32

## 2022-07-11 ASSESSMENT — PAIN - FUNCTIONAL ASSESSMENT: PAIN_FUNCTIONAL_ASSESSMENT: PREVENTS OR INTERFERES SOME ACTIVE ACTIVITIES AND ADLS

## 2022-07-11 ASSESSMENT — PAIN DESCRIPTION - LOCATION
LOCATION: LEG
LOCATION: ABDOMEN
LOCATION: GENERALIZED
LOCATION: GENERALIZED

## 2022-07-11 ASSESSMENT — PAIN SCALES - GENERAL
PAINLEVEL_OUTOF10: 9
PAINLEVEL_OUTOF10: 3
PAINLEVEL_OUTOF10: 2
PAINLEVEL_OUTOF10: 7
PAINLEVEL_OUTOF10: 3
PAINLEVEL_OUTOF10: 8
PAINLEVEL_OUTOF10: 8

## 2022-07-11 ASSESSMENT — PAIN SCALES - WONG BAKER: WONGBAKER_NUMERICALRESPONSE: 2

## 2022-07-11 ASSESSMENT — PAIN DESCRIPTION - ORIENTATION: ORIENTATION: RIGHT;LEFT

## 2022-07-11 ASSESSMENT — PAIN DESCRIPTION - DESCRIPTORS
DESCRIPTORS: ACHING
DESCRIPTORS: ACHING

## 2022-07-11 NOTE — PROGRESS NOTES
This patient is on medication that requires renal, weight, and/or indication dose adjustment. Date Body Weight IBW  Adjusted BW SCr  CrCl Dialysis status   7/11/2022 182 lb 3.2 oz (82.6 kg) Ideal body weight: 45.5 kg (100 lb 4.9 oz)  Adjusted ideal body weight: 60.4 kg (133 lb 1 oz) Serum creatinine: 1.3 mg/dL (H) 07/11/22 0518  Estimated creatinine clearance: 41 mL/min (A) N/a       Pharmacy has dose-adjusted the following medication(s):    Date Previous Order Adjusted Order   7/11/2022 Levaquin 750 mg iv daily Levaquin 750 mg iv q48h       These changes were made per protocol according to the [facility] Clinical Guidance for Pharmacists. *Please note this dose may need readjusted if patient's condition changes. Please contact pharmacy with any questions regarding these changes.     AYANNA Gardner SARIKA [facility]  7/11/2022  7:52 AM

## 2022-07-11 NOTE — PROGRESS NOTES
[facility] Progress Note    Admitting Date and Time: 7/10/2022  7:43 AM  Admit Dx: Proctitis [K62.89]  Pneumonia [J18.9]  Abdominal pain, unspecified abdominal location [R10.9]  Pneumonia due to infectious organism, unspecified laterality, unspecified part of lung [J18.9]    Subjective:  Patient is being followed for Proctitis [K62.89]  Pneumonia [J18.9]  Abdominal pain, unspecified abdominal location [R10.9]  Pneumonia due to infectious organism, unspecified laterality, unspecified part of lung [J18.9]     Seen at bedside, awake and alert   Afebrile   C/o chronic pain and incisional pain to abdomen  Body aches resolved   Tolerating medications   No other complaints at this time   No cough, no SOB     ROS: denies fever, chills, cp, sob, n/v, HA unless stated above.       [START ON 7/12/2022] levofloxacin  750 mg IntraVENous Q48H    HYDROmorphone  0.5 mg IntraVENous Once    amLODIPine  5 mg Oral Nightly    apixaban  5 mg Oral BID    polyvinyl alcohol  1 drop Both Eyes Q12H    desmopressin  200 mcg Oral BID    docusate sodium  100 mg Oral BID    escitalopram  10 mg Oral BID    famotidine  20 mg Oral BID    ferrous sulfate  325 mg Oral BID    levothyroxine  75 mcg Oral Daily    lidocaine  1 patch Topical Daily    metoprolol succinate  150 mg Oral Daily    omega-3 acid ethyl esters  1 g Oral BID    sodium chloride flush  5-40 mL IntraVENous 2 times per day    ipratropium-albuterol  1 ampule Inhalation Q4H WA    predniSONE  5 mg Oral Daily    budesonide  250 mcg Nebulization BID    Arformoterol Tartrate  15 mcg Nebulization BID     sodium chloride, 1 spray, Q3H PRN  sodium chloride flush, 5-40 mL, PRN  sodium chloride, , PRN  ondansetron, 4 mg, Q8H PRN   Or  ondansetron, 4 mg, Q6H PRN  polyethylene glycol, 17 g, Daily PRN  acetaminophen, 650 mg, Q6H PRN   Or  acetaminophen, 650 mg, Q6H PRN  potassium chloride, 40 mEq, PRN   Or  potassium alternative oral replacement, 40 mEq, PRN Or  potassium chloride, 10 mEq, PRN  oxyCODONE-acetaminophen, 1 tablet, Q4H PRN   Or  oxyCODONE-acetaminophen, 2 tablet, Q4H PRN  HYDROmorphone, 0.25 mg, Q3H PRN   Or  HYDROmorphone, 0.5 mg, Q3H PRN         Objective:    /68   Pulse 98   Temp 98.4 °F (36.9 °C) (Oral)   Resp 16   Ht 5' (1.524 m)   Wt 182 lb 3.2 oz (82.6 kg)   LMP  (LMP Unknown)   SpO2 90%   BMI 35.58 kg/m²     General Appearance: alert and oriented to person, place and time and in no acute distress  Skin: warm and dry  Head: normocephalic and atraumatic  Eyes: pupils equal, round, and reactive to light, extraocular eye movements intact, conjunctivae normal  Neck: neck supple and non tender without mass   Pulmonary/Chest: clear to auscultation bilaterally- no wheezes, rales or rhonchi, normal air movement, no respiratory distress + 4 L via NC   Cardiovascular: normal rate, normal S1 and S2 and no carotid bruits  Abdomen: soft, non-tender, non-distended, normal bowel sounds, no masses or organomegaly -- ++ wound over ventral hernia without signs of infection/necrosis   Extremities: no cyanosis, no clubbing and no edema  Neurologic: no cranial nerve deficit and speech normal    Recent Labs     07/09/22  1620 07/10/22  0953 07/11/22  0518    139 143   K 4.0 4.1 3.3*   CL 99 95* 101   CO2 34* 30* 30*   BUN 24* 29* 22   CREATININE 1.4* 1.8* 1.3*   GLUCOSE 79 107* 85   CALCIUM 10.0 10.2 8.9       Recent Labs     07/09/22  1620 07/10/22  0953 07/11/22  0518   WBC 4.9 5.9 3.5*   RBC 3.75 3.51 3.11*   HGB 9.4* 9.1* 8.0*   HCT 32.2* 31.0* 27.6*   MCV 85.9 88.3 88.7   MCH 25.1* 25.9* 25.7*   MCHC 29.2* 29.4* 29.0*   RDW 14.2 14.4 14.3    123* 127*   MPV 11.0 11.8 12.4*       Radiology: reviewed     Assessment:    Principal Problem:    Pneumonia  Active Problems:    Ventral hernia without obstruction or gangrene    Long term (current) use of systemic steroids    Confusion    Chronic back pain    Primary hypothyroidism    Diabetes insipidus, neurohypophyseal (HCC)    Sarcoidosis    Chronic pain syndrome    Chronic kidney disease, stage III (moderate) (HCC)    PAF (paroxysmal atrial fibrillation) (HCC) currently in NSR    Mixed simple and mucopurulent chronic bronchitis (HCC)    Chronic, continuous use of opioids  Resolved Problems:    * No resolved hospital problems. *      Plan:  1. Pneumonia  -CT scan shows bronchiectasis with concern for pneumonia of bilateral lower lobes  - Levaquin 750 mg Q48 hours - adjusted for renal function   -Continue with duo nebs  -Strep and Legionella urine negative  -Procalcitonin 0.14   -Oxygen therapy - 4 L, wean to maintain SpO2 > 90%     2. CKD most likely secondary to recent CAT scan with IV contrast  -Creatinine 1.8 > 1.3 Monitor   -IV fluid at 100 cc/h     3. Anemia of chronic disease  -Hemoglobin 8.0   -No signs of bleeding at present  - tranfuse if Hg < 7.0   - will monitor due to being on Eliquis   - continue on Ferrous sulfate   - CBC in AM      4. Thrombocytopenia  -Platelets at 164, will check daily CBC and monitor for signs of bleeding  -Consider consultation to hematology if worsening  - CBC in AM      5. Confusion  -May be secondary to pneumonia versus pain medication  -If no improvement will consider CT scan, last CT of the head was done in 2019 which showed chronic periventricular microangiopathy  -States that memory issues have been worsening over the last several months  -Cultures pending     6. Hiatal hernia with chronic abdominal pain/wound  -Consult general surgery - no plans for surgical intervention  -CT shows ventral hernia without signs of obstruction at this time  -Consult wound care - follows at wound care with Dr Otoniel Campos      7. Chronic bronchitis with pulmonary sarcoid  -Continue with DuoNebs and daily prednisone  -Consider pulmonary consult if no improvement in the next several days     8. Chronic opiate use may be causing some of the confusion as noted above.   Chart review shows that the patient continues to ask for more pain medication. Her next refill is due on the 13th and she may have presented for pain medication only. -UDS + opiate and oxycodone   - continue with Colace      9. Diabetes insipidus continue with DDAVP and monitoring of electrolytes  K 3.3 PRN replacement   CMP tomorrow     CODE: full  Discharge dispo: home tomorrow with [facility]     30 minutes time spent reviewing patient chart, assessing patient, discussing plan of care with patient and family, discussing plan of care with collaborating physician, and charting.     Electronically signed by FRIDA Hutchinson NP on 7/11/2022 at 11:55 AM

## 2022-07-11 NOTE — CARE COORDINATION
Social Work:    Reviewed chart notes. Mrs. Derik Toure was admitted to [facility] from home due to leg and body aches. She has a history of diabetes, sarcoidosis, pulmonary HTN, HLD, CHK, A.Fib. Social service met with Mrs. Marisela Montesan Charles), advised her about social service &  roles, and discussed discharge planning. Jeanie Lundborg resides with her  and uses a wheeled walker and 02 (5 liters/[employer]). Rebekah's PCP is Dr. Tex Lindo. We discussed HHC vs snf and Jeanie Lundborg plans return home and will accept [facility]. Choices for [facility] were provided and Jeanie Lundborg has no agency preference. A tentative referral was given to [place] at [facility] Social work asked Jeanie Lundborg if she had any services at home and she advised that she could if she needed them through her insurance. Social work called the [facility] and left a voice message with Rebekah's Debby  [phone] to update him about hospital stay and confirm services at home as Jeanie Lundborg was not feeling well upon assessment.     Electronically signed by JIGAR Rey on 7/11/2022 at 1:34 PM

## 2022-07-11 NOTE — CONSULTS
GENERAL SURGERY  CONSULT NOTE  7/11/2022    Physician Consulted: Dr. Tracey Bazzi  CC: abdominal pain, chronic wound, ventral hernia  Referring Physician: Dr. Kisha Fried is a 73 y.o. female who presents for evaluation of chronic pain. General surgery was consulted for abdominal pain, chronic wound, and ventral hernia. Patient is passing gas and having bowel movements. She states that she vomited a couple days ago but is no longer nauseous and has not vomited since. Follows with Dr. Alexei Edwards in the wound clinic. She has known bowel containing ventral hernia that is reducible. There is overlying wound to this hernia that has good granulation tissue in place. She has no obstructive symptoms from this hernia. She is on Eliquis. She has a history of colectomy for ischemic colitis. CT AP significant for large bowel containing ventral hernia. There is no evidence for obstruction. She has been afebrile, hemodynamically stable, and without leukocytosis.       Past Medical History:   Diagnosis Date    A-fib [facility])     follows with Dr Kate Mack to transfer from chair to wheelchair     with assistance    Abnormal mammogram 2010    Acute on chronic respiratory failure ([address] 05/05/2017    Anemia     Anemia due to chronic illness     Ankle fracture, left 2008    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[address] 03/26/2007    Backache     Benign hypertension     CHF (congestive heart failure) (HCC)     Chronic back pain     Chronic kidney disease     stage 3    Chronic pain disorder     Chronic, continuous use of opioids     Compression fracture of thoracic vertebra (HCC)     T10    COPD (chronic obstructive pulmonary disease) ([address]     Debility     Deformity of ankle and foot, acquired 01/31/2011    Depression     Diabetes insipidus ([address]     Diverticulitis     Dry eyes     Encephalopathy acute 05/05/2017    Gait disturbance     GERD (gastroesophageal reflux disease)  UPPER GASTROINTESTINAL ENDOSCOPY  07/26/2017       Medications Prior to Admission:    Prior to Admission medications    Medication Sig Start Date End Date Taking? Authorizing Provider   promethazine-codeine (PHENERGAN WITH CODEINE) 6.25-10 MG/5ML syrup Take 5 mLs by mouth 4 times daily as needed for Cough for up to 60 days. 7/8/22 9/6/22 Yes Misty Medrano MD   metoprolol succinate (TOPROL XL) 100 MG extended release tablet Take 1 tablet by mouth daily 7/8/22  Yes Misty Medrano MD   metoprolol succinate (TOPROL XL) 50 MG extended release tablet TAKE ONE TABLET EVERY DAY WITH THE 100MG 7/8/22  Yes Misty Medrano MD   levothyroxine (SYNTHROID) 75 MCG tablet Take 1 tablet by mouth daily 7/8/22  Yes Misty Medrano MD   escitalopram (LEXAPRO) 10 MG tablet Take 1 tablet by mouth 2 times daily 7/8/22 8/7/22 Yes Misty Medrano MD   famotidine (PEPCID) 20 MG tablet Take 1 tablet by mouth 2 times daily 7/8/22 8/7/22 Yes Misty Medrano MD   ferrous sulfate (IRON 325) 325 (65 Fe) MG tablet Take 1 tablet by mouth 2 times daily 7/8/22  Yes Misty Medrano MD   amLODIPine (NORVASC) 5 MG tablet Take 1 tablet by mouth nightly 7/8/22  Yes Misty Medrano MD   predniSONE (DELTASONE) 5 MG tablet Take 1 tablet by mouth daily 7/8/22  Yes Misty Medrano MD   cycloSPORINE (RESTASIS) 0.05 % ophthalmic emulsion Place 1 drop into both eyes every 12 hours 7/8/22  Yes Misty Medrano MD   oxyCODONE-acetaminophen (PERCOCET) 5-325 MG per tablet Take 1 tablet by mouth every 4 hours as needed for Pain (max: 150/month) for up to 30 days.  7/13/22 8/12/22 Yes Misty Medrano MD   lidocaine (LIDODERM) 5 % Place 1 patch onto the skin daily for 10 days 12 hours on, 12 hours off. 7/8/22 7/18/22 Yes Misty Medrano MD   desmopressin (DDAVP) 0.1 mg tablet Take 2 tablets by mouth 2 times daily 6/13/22 9/11/22 Yes Misty Medrano MD   docusate sodium (COLACE) 100 mg capsule Take 1 capsule by mouth 2 times daily 6/10/22  Yes Misty Medrano MD   albuterol sulfate HFA (PROVENTIL HFA) 108 (90 Base) MCG/ACT inhaler Inhale 2 puffs into the lungs every 6 hours as needed for Wheezing or Shortness of Breath 5/17/22  Yes Lisandra Wayne MD   omega-3 acid ethyl esters (LOVAZA) 1 g capsule Take 1 capsule by mouth 2 times daily 4/27/22  Yes Lisandra Wayne MD   BREO ELLIPTA 100-25 MCG/INH AEPB inhaler Inhale 1 puff into the lungs daily 3/17/22  Yes Lisnadra Wayne MD   Omega 3 1000 MG CAPS Take 1 each by mouth daily 3/9/22  Yes Lisandra Wayne MD   albuterol (PROVENTIL) (2.5 MG/3ML) 0.083% nebulizer solution Take 3 mLs by nebulization every 6 hours as needed for Wheezing or Shortness of Breath 2/19/22  Yes Blanca Sweeney, DO   naloxone 4 MG/0.1ML LIQD nasal spray 1 spray by Nasal route as needed for Opioid Reversal 12/14/21  Yes Js Quiroga MD   apixaban (ELIQUIS) 5 MG TABS tablet Take 1 tablet by mouth 2 times daily 11/4/21  Yes Homer Israel MD   mupirocin [facility]) 2 % ointment Apply topically daily 11/4/21  Yes Homer Israel MD   Respiratory Therapy Supplies (NEBULIZER/TUBING/MOUTHPIECE) KIT 1 kit by Does not apply route daily as needed (wheezing) 4/2/21  Yes Homer Israel MD   hydrocortisone (ANUSOL-HC) 2.5 % CREA rectal cream Apply to affected area BID 10/13/20  Yes Homer Israel MD   sodium chloride (ALTAMIST SPRAY) 0.65 % nasal spray 1 spray by Nasal route as needed for Congestion 8/6/20  Yes Heriberto Rogers, DO   OXYGEN 4 L/min by Nasal route as needed.  BMS   Yes Historical Provider, MD   mupirocin (BACTROBAN) 2 % ointment Apply topically daily 6/8/20   Homer Israel MD       No Known Allergies    Family History   Problem Relation Age of Onset    Diabetes Mother     Arthritis Mother     High Blood Pressure Mother     Arthritis Father     High Blood Pressure Father     Diabetes Father     High Blood Pressure Sister     Alcohol Abuse Sister     Substance Abuse Brother     Substance Abuse Sister     Coronary Art Dis Neg Hx     Breast Cancer Neg Hx     Ovarian Cancer Neg Hx        Social History     Tobacco Use    Smoking status: Former Smoker     Packs/day: 0.75     Years: 25.00     Pack years: 18.75     Types: Cigarettes     Start date:      Quit date: 9/10/1996     Years since quittin.8    Smokeless tobacco: Never Used   Vaping Use    Vaping Use: Never used   Substance Use Topics    Alcohol use: Never     Alcohol/week: 0.0 standard drinks    Drug use: Yes     Comment:  tory         Review of Systems   General ROS: negative for - chills or fever  Hematological and Lymphatic ROS: negative for - bleeding problems or blood clots  Respiratory ROS: no cough, shortness of breath, or wheezing  Cardiovascular ROS: no chest pain or dyspnea on exertion  Gastrointestinal ROS: Negative for nausea, vomiting, and changes in bowel habits. Genito-Urinary ROS: no dysuria, trouble voiding, or hematuria  Musculoskeletal ROS: negative for - muscle pain or muscular weakness      PHYSICAL EXAM:    Vitals:    22 0730   BP: 108/68   Pulse: 98   Resp: 16   Temp: 98.4 °F (36.9 °C)   SpO2: 93%       General Appearance:  awake, alert, oriented, in no acute distress  Skin: Wound overlying ventral hernia with good granulation tissue no evidence of necrosis. Head/face:  NCAT  Eyes:  No gross abnormalities. Lungs: On 4 L of O2  Heart:  Regular rate, normotensive  Abdomen:  Soft, non-tender, non-distended. Ventral hernia present  Musculoskeletal : Extremities warm to touch, pink, with no edema. LABS:    CBC  Recent Labs     22  0518   WBC 3.5*   HGB 8.0*   HCT 27.6*   *     BMP  Recent Labs     22  0518      K 3.3*      CO2 30*   BUN 22   CREATININE 1.3*   CALCIUM 8.9     Liver Function  Recent Labs     22  1620   LIPASE 10*   BILITOT 0.6   AST 18   ALT 10   ALKPHOS 74   PROT 6.4   LABALBU 3.6     No results for input(s): LACTATE in the last 72 hours. No results for input(s): INR, PTT in the last 72 hours.     Invalid input(s): PT    RADIOLOGY    No results found. ASSESSMENT:  73 y.o. female with chronic ventral hernia with overlying wound that she follows for at wound care clinic. PLAN:  Ana Maria Adorno for diet as tolerated  Continue local wound care to wound  No plans for surgical intervention at this time. Please message general surgery with any questions or concerns. Will be available as needed. Electronically signed by Brennon Hilraio MD on 7/11/22 at 8:03 AM EDT      I saw and examined the patient. I reviewed the above resident's note. I reviewed all labs, radiology, and all test results listed above. I agree with the assessment and plan as outlined.     Chandrika Mcgee MD  General Surgery

## 2022-07-11 NOTE — PLAN OF CARE
Patient's chart updated to reflect:      . - HF care plan, HF education points and HF discharge instructions.  -Orders: 2 gram sodium diet, daily weights, I/O.  -PCP and/or Cardiologist appointment to be scheduled within 7 days of hospital discharge.  -History of HF, not primary admission Dx.   Patient admitted for treatment of Pneumonia     Giovany Julian RN BSN  Heart Failure Navigator

## 2022-07-12 ENCOUNTER — APPOINTMENT (OUTPATIENT)
Dept: GENERAL RADIOLOGY | Age: 67
DRG: 194 | End: 2022-07-12
Payer: MEDICARE

## 2022-07-12 VITALS
SYSTOLIC BLOOD PRESSURE: 113 MMHG | TEMPERATURE: 98.2 F | OXYGEN SATURATION: 92 % | HEIGHT: 60 IN | BODY MASS INDEX: 37.54 KG/M2 | RESPIRATION RATE: 26 BRPM | HEART RATE: 93 BPM | DIASTOLIC BLOOD PRESSURE: 78 MMHG | WEIGHT: 191.2 LBS

## 2022-07-12 LAB
ALBUMIN SERPL-MCNC: 3.4 G/DL (ref 3.5–5.2)
ALP BLD-CCNC: 98 U/L (ref 35–104)
ALT SERPL-CCNC: 26 U/L (ref 0–32)
ANION GAP SERPL CALCULATED.3IONS-SCNC: 9 MMOL/L (ref 7–16)
AST SERPL-CCNC: 61 U/L (ref 0–31)
BASOPHILS ABSOLUTE: 0.01 E9/L (ref 0–0.2)
BASOPHILS RELATIVE PERCENT: 0.3 % (ref 0–2)
BILIRUB SERPL-MCNC: 0.2 MG/DL (ref 0–1.2)
BUN BLDV-MCNC: 13 MG/DL (ref 6–23)
CALCIUM SERPL-MCNC: 8.3 MG/DL (ref 8.6–10.2)
CHLORIDE BLD-SCNC: 105 MMOL/L (ref 98–107)
CO2: 27 MMOL/L (ref 22–29)
CREAT SERPL-MCNC: 1.1 MG/DL (ref 0.5–1)
EOSINOPHILS ABSOLUTE: 0.14 E9/L (ref 0.05–0.5)
EOSINOPHILS RELATIVE PERCENT: 3.8 % (ref 0–6)
GFR AFRICAN AMERICAN: >60
GFR NON-AFRICAN AMERICAN: >60 ML/MIN/1.73
GLUCOSE BLD-MCNC: 104 MG/DL (ref 74–99)
HCT VFR BLD CALC: 27.6 % (ref 34–48)
HEMOGLOBIN: 8.1 G/DL (ref 11.5–15.5)
HYPOCHROMIA: ABNORMAL
IMMATURE GRANULOCYTES #: 0.01 E9/L
IMMATURE GRANULOCYTES %: 0.3 % (ref 0–5)
LYMPHOCYTES ABSOLUTE: 0.55 E9/L (ref 1.5–4)
LYMPHOCYTES RELATIVE PERCENT: 15.1 % (ref 20–42)
MCH RBC QN AUTO: 26.3 PG (ref 26–35)
MCHC RBC AUTO-ENTMCNC: 29.3 % (ref 32–34.5)
MCV RBC AUTO: 89.6 FL (ref 80–99.9)
MONOCYTES ABSOLUTE: 0.53 E9/L (ref 0.1–0.95)
MONOCYTES RELATIVE PERCENT: 14.6 % (ref 2–12)
NEUTROPHILS ABSOLUTE: 2.4 E9/L (ref 1.8–7.3)
NEUTROPHILS RELATIVE PERCENT: 65.9 % (ref 43–80)
PDW BLD-RTO: 14.4 FL (ref 11.5–15)
PLATELET # BLD: 126 E9/L (ref 130–450)
PMV BLD AUTO: 11.4 FL (ref 7–12)
POLYCHROMASIA: ABNORMAL
POTASSIUM SERPL-SCNC: 4.1 MMOL/L (ref 3.5–5)
PROCALCITONIN: 0.08 NG/ML (ref 0–0.08)
RBC # BLD: 3.08 E12/L (ref 3.5–5.5)
SODIUM BLD-SCNC: 141 MMOL/L (ref 132–146)
TOTAL PROTEIN: 6.1 G/DL (ref 6.4–8.3)
WBC # BLD: 3.6 E9/L (ref 4.5–11.5)

## 2022-07-12 PROCEDURE — 99239 HOSP IP/OBS DSCHRG MGMT >30: CPT | Performed by: INTERNAL MEDICINE

## 2022-07-12 PROCEDURE — 6370000000 HC RX 637 (ALT 250 FOR IP): Performed by: FAMILY MEDICINE

## 2022-07-12 PROCEDURE — 94640 AIRWAY INHALATION TREATMENT: CPT

## 2022-07-12 PROCEDURE — 71045 X-RAY EXAM CHEST 1 VIEW: CPT

## 2022-07-12 PROCEDURE — 97161 PT EVAL LOW COMPLEX 20 MIN: CPT

## 2022-07-12 PROCEDURE — 97530 THERAPEUTIC ACTIVITIES: CPT

## 2022-07-12 PROCEDURE — 6360000002 HC RX W HCPCS: Performed by: FAMILY MEDICINE

## 2022-07-12 PROCEDURE — 84145 PROCALCITONIN (PCT): CPT

## 2022-07-12 PROCEDURE — 97165 OT EVAL LOW COMPLEX 30 MIN: CPT

## 2022-07-12 PROCEDURE — 2700000000 HC OXYGEN THERAPY PER DAY

## 2022-07-12 PROCEDURE — 2580000003 HC RX 258: Performed by: FAMILY MEDICINE

## 2022-07-12 PROCEDURE — 36415 COLL VENOUS BLD VENIPUNCTURE: CPT

## 2022-07-12 PROCEDURE — 85025 COMPLETE CBC W/AUTO DIFF WBC: CPT

## 2022-07-12 PROCEDURE — 80053 COMPREHEN METABOLIC PANEL: CPT

## 2022-07-12 RX ADMIN — ARFORMOTEROL TARTRATE 15 MCG: 15 SOLUTION RESPIRATORY (INHALATION) at 09:20

## 2022-07-12 RX ADMIN — FERROUS SULFATE TAB 325 MG (65 MG ELEMENTAL FE) 325 MG: 325 (65 FE) TAB at 08:50

## 2022-07-12 RX ADMIN — OXYCODONE AND ACETAMINOPHEN 2 TABLET: 5; 325 TABLET ORAL at 01:53

## 2022-07-12 RX ADMIN — LEVOTHYROXINE SODIUM 75 MCG: 75 TABLET ORAL at 06:23

## 2022-07-12 RX ADMIN — OMEGA-3-ACID ETHYL ESTERS 1 G: 1 CAPSULE, LIQUID FILLED ORAL at 08:51

## 2022-07-12 RX ADMIN — APIXABAN 5 MG: 5 TABLET, FILM COATED ORAL at 08:51

## 2022-07-12 RX ADMIN — DOCUSATE SODIUM 100 MG: 100 CAPSULE, LIQUID FILLED ORAL at 08:50

## 2022-07-12 RX ADMIN — SODIUM CHLORIDE: 9 INJECTION, SOLUTION INTRAVENOUS at 04:18

## 2022-07-12 RX ADMIN — BUDESONIDE 250 MCG: 0.25 SUSPENSION RESPIRATORY (INHALATION) at 09:20

## 2022-07-12 RX ADMIN — HYDROMORPHONE HYDROCHLORIDE 0.5 MG: 1 INJECTION, SOLUTION INTRAMUSCULAR; INTRAVENOUS; SUBCUTANEOUS at 04:38

## 2022-07-12 RX ADMIN — IPRATROPIUM BROMIDE AND ALBUTEROL SULFATE 1 AMPULE: 2.5; .5 SOLUTION RESPIRATORY (INHALATION) at 09:20

## 2022-07-12 RX ADMIN — LEVOFLOXACIN 750 MG: 5 INJECTION, SOLUTION INTRAVENOUS at 14:05

## 2022-07-12 RX ADMIN — SALINE NASAL SPRAY 1 SPRAY: 1.5 SOLUTION NASAL at 08:51

## 2022-07-12 RX ADMIN — POLYVINYL ALCOHOL 1 DROP: 14 SOLUTION/ DROPS OPHTHALMIC at 06:23

## 2022-07-12 RX ADMIN — METOPROLOL SUCCINATE 150 MG: 50 TABLET, EXTENDED RELEASE ORAL at 08:51

## 2022-07-12 RX ADMIN — OXYCODONE AND ACETAMINOPHEN 2 TABLET: 5; 325 TABLET ORAL at 08:50

## 2022-07-12 RX ADMIN — IPRATROPIUM BROMIDE AND ALBUTEROL SULFATE 1 AMPULE: 2.5; .5 SOLUTION RESPIRATORY (INHALATION) at 15:30

## 2022-07-12 RX ADMIN — ESCITALOPRAM OXALATE 10 MG: 10 TABLET ORAL at 08:51

## 2022-07-12 RX ADMIN — HYDROMORPHONE HYDROCHLORIDE 0.5 MG: 1 INJECTION, SOLUTION INTRAMUSCULAR; INTRAVENOUS; SUBCUTANEOUS at 14:02

## 2022-07-12 RX ADMIN — DESMOPRESSIN ACETATE 200 MCG: 0.1 TABLET ORAL at 08:51

## 2022-07-12 RX ADMIN — PREDNISONE 5 MG: 5 TABLET ORAL at 08:51

## 2022-07-12 RX ADMIN — SODIUM CHLORIDE, PRESERVATIVE FREE 10 ML: 5 INJECTION INTRAVENOUS at 08:52

## 2022-07-12 RX ADMIN — FAMOTIDINE 20 MG: 20 TABLET ORAL at 08:51

## 2022-07-12 RX ADMIN — IPRATROPIUM BROMIDE AND ALBUTEROL SULFATE 1 AMPULE: 2.5; .5 SOLUTION RESPIRATORY (INHALATION) at 11:27

## 2022-07-12 ASSESSMENT — PAIN DESCRIPTION - LOCATION
LOCATION: ABDOMEN
LOCATION: LEG
LOCATION: LEG

## 2022-07-12 ASSESSMENT — PAIN SCALES - GENERAL
PAINLEVEL_OUTOF10: 2
PAINLEVEL_OUTOF10: 9
PAINLEVEL_OUTOF10: 2

## 2022-07-12 ASSESSMENT — PAIN DESCRIPTION - DESCRIPTORS
DESCRIPTORS: ACHING;SHARP
DESCRIPTORS: ACHING
DESCRIPTORS: ACHING

## 2022-07-12 ASSESSMENT — PAIN SCALES - WONG BAKER
WONGBAKER_NUMERICALRESPONSE: 2
WONGBAKER_NUMERICALRESPONSE: 2

## 2022-07-12 ASSESSMENT — PAIN DESCRIPTION - ORIENTATION
ORIENTATION: RIGHT;LEFT
ORIENTATION: RIGHT;LEFT

## 2022-07-12 NOTE — PROGRESS NOTES
Physical Therapy  Facility/Department: [address] MED SURG/TELE  Physical Therapy Initial Assessment    Name: Roberth Talavera  : 1955  MRN: [id number]  Date of Service: 2022      Attending Provider:  Rebecca Wang DO    Evaluating PT:  Soo Mayer, P.T. Room #:  6393/2304-  Diagnosis:  Proctitis [K62.89]  Pneumonia [J18.9]  Abdominal pain, unspecified abdominal location [R10.9]  Pneumonia due to infectious organism, unspecified laterality, unspecified part of lung [J18.9]  Pertinent PMHx/PSHx:  L ankle fx  with chronic Charcot deformity, R tibia fx  with chronic varus deformity  Precautions:  Falls, bed/chair alarm    SUBJECTIVE:    Pt lives with her  in a 1 story home with a ramp to enter. Pt ambulated with ww in the home and uses a motorized scooter outside. Per chart pt does not walk much. She also uses 5L home O2. OBJECTIVE:   Initial Evaluation  Date: 22 Treatment Short Term/ Long Term   Goals   Was pt agreeable to Eval/treatment? yes     Does pt have pain? Nothing too bad this am     Bed Mobility  Rolling: supervision  Supine to sit: supervision  Sit to supine: supervision  Scooting: supervision  Independent    Transfers Sit to stand: MIN A  Stand to sit: MIN A  Stand pivot: MOD A with no AD and MIN A with ww  SBA   Ambulation   3 feet with no AD MOD A  And 3 feet with ww MIN A  25 feet with ww SBA   Stair negotiation: ascended and descended NA  NA   AM-PAC 6 Clicks 94/85       BLE ROM is WFL, but pt does have R knee deformity and L ankle deformity, both of which are chronic. BLE strength is grossly 3-/5 to 4-5/.    Sensation:  Pt c/o numbness and tingling to BLE and feet  Edema:  None noted  Balance: sitting is supervision and standing with no AD is MIN A/MOD A and standing with ww is CGA/MIN A  Endurance: fair-    ASSESSMENT:    Conditions Requiring Skilled Therapeutic Intervention:    [x]Decreased strength     []Decreased ROM  [x]Decreased functional mobility  [x]Decreased balance   [x]Decreased endurance   [x]Decreased posture  []Decreased sensation  []Decreased coordination   []Decreased vision  []Decreased safety awareness   []Increased pain     Comments:  Pt was found sitting on BSC and agreeable to PT. She reported she would be fine standing to walk back to her bed with no AD. Pt required MOD A and has decreased strength BLE and during transfer moved very slow and feet became crossed at one point. She moved slowly and required increased time to walk to her bed. Pt was able to lie back in bed on her own and then was agreeable to sitting up in chair for breakfast.  She got to EOB on her own and stood second time with ww and walked 3 feet to chair and required less physical assist when using ww for support and was safer as her feet did not cross. Pt has decreased strength and endurance and visible R knee and L ankle deformity making it difficult to ambulate without dysfunction and increases her risk for a fall. Treatment:  Patient practiced and was instructed in the following treatment:     Bed mobility, transfers, and gait with ww to improve functional strength, balance, and endurance. Pt was left sitting up in chair on waffle cushion with call light left by patient. Chair/bed alarm: chair alarm was activated. Pt's/ family goals   1. To go home. Patient and or family understand(s) diagnosis, prognosis, and plan of care. PHYSICAL THERAPY PLAN OF CARE:    PT POC is established based on physician order and patient diagnosis     Referring provider/PT Order:  PT eval and treat  Diagnosis:  Proctitis [K62.89]  Pneumonia [J18.9]  Abdominal pain, unspecified abdominal location [R10.9]  Pneumonia due to infectious organism, unspecified laterality, unspecified part of lung [J18.9]  Specific instructions for next treatment: To work on increasing amb distance as pt is able.     Current Treatment Recommendations:     [x] Strengthening to improve independence with functional mobility   [] ROM to improve ROM and decrease spasm and pain which will help promote independence with functional mobility   [x] Balance Training to improve static/dynamic balance and to reduce fall risk  [x] Endurance Training to improve activity tolerance during functional mobility   [x] Transfer Training to improve safety and independence with all functional transfers   [x] Gait Training to improve gait mechanics, endurance and assess need for appropriate assistive device  [] Stair Training in preparation for safe discharge home and/or into the community   [] Positioning to prevent skin breakdown and contractures  [] Safety and Education Training   [x] Patient/Caregiver Education   [] HEP  [] Other     PT long term treatment goals are located in above grid    Frequency of treatments: 2-5x/week x 1-2 weeks. Time in  07:30  Time out  07:55    Total Treatment Time 10 minutes     Evaluation Time includes thorough review of current medical information, gathering information on past medical history/social history and prior level of function, completion of standardized testing/informal observation of tasks, assessment of data and education on plan of care and goals. CPT codes:  [x] Low Complexity PT evaluation 10622  [] Moderate Complexity PT evaluation 91612  [] High Complexity PT evaluation 61310  [] PT Re-evaluation 21817  [] Gait training 71153 ** minutes  [] Manual therapy 62582 ** minutes  [x] Therapeutic activities 86932 10 minutes  [] Therapeutic exercises 28591 ** minutes  [] Neuromuscular reeducation 55356 ** minutes     Elijah Gr, P.T.   License Number: [id number]

## 2022-07-12 NOTE — DISCHARGE SUMMARY
[facility] Physician Discharge Summary       MD [facility]  [address] [address]  [phone]    Schedule an appointment as soon as possible for a visit in 2 weeks  ventral hernia, abdominal pain, wound    Activity level: As tolerated   Dispo: Home  Condition on discharge: Stable     Patient ID:  Ny Galaviz  [id number]  73 y.o.  1955    Admit date: 7/10/2022    Discharge date and time:  7/12/2022  1:47 PM    Admission Diagnoses: Principal Problem:    Pneumonia  Active Problems:    Ventral hernia without obstruction or gangrene    Long term (current) use of systemic steroids    Confusion    Chronic back pain    Primary hypothyroidism    Diabetes insipidus, neurohypophyseal (HCC)    Sarcoidosis    Chronic pain syndrome    Chronic kidney disease, stage III (moderate) (HCC)    PAF (paroxysmal atrial fibrillation) (HCC) currently in NSR    Mixed simple and mucopurulent chronic bronchitis (HCC)    Chronic, continuous use of opioids  Resolved Problems:    * No resolved hospital problems. *    Discharge Diagnoses: Principal Problem:    Pneumonia  Active Problems:    Ventral hernia without obstruction or gangrene    Long term (current) use of systemic steroids    Confusion    Chronic back pain    Primary hypothyroidism    Diabetes insipidus, neurohypophyseal (HCC)    Sarcoidosis    Chronic pain syndrome    Chronic kidney disease, stage III (moderate) (HCC)    PAF (paroxysmal atrial fibrillation) (HCC) currently in NSR    Mixed simple and mucopurulent chronic bronchitis (HCC)    Chronic, continuous use of opioids  Resolved Problems:    * No resolved hospital problems.  *      Consults:  IP CONSULT  [address] Course:   Patient Ny Galaviz is a 73 y.o. presented with Proctitis [K62.89]  Pneumonia [J18.9]  Abdominal pain, unspecified abdominal location [R10.9]  Pneumonia due to infectious organism, unspecified laterality, unspecified part of lung [J18.9]     This is a 73year old female with history of diabetes insipidus, pulmonary sarcoidosis, hypertension, hyperlipidemia, chronic kidney disease, depression, paroxysmal A. fib, hypothyroidism, ventral hernia, and chronic opiate use who presents today to the emergency department for complaints of chronic pain particularly to the bilateral lower extremities. Patient was seen in [facility] one day prior to admission for similar complaints. Denied fall, injury, states pain is progressive. CT showed bronchiectasis with concern for pneumonia and she was started on Levaquin renally dosed. CKD secondary to IV contrast dye - improved with IV fluids. One dose of Levaquin given IV, repeat CXR negative and repeat procalcitonin negative. Oxygen status back to baseline - uses 2-4 L via NC at home. Hiatal hernia with chronic abdominal wound, following with wound care outpatient. Chronic opioid use and steroid use. UDS + opiate and oxycodone. Chart review notes that refill is due July 13th, but patient is asking for more pain medication here and at [facility] a few days ago - possibly presented for pain medication due to running out early.  Patient is clinically stable for discharge and baseline oxygenation.      CODE: full  Discharge dispo: home [place] [id number]     Discharge Exam:    General Appearance: alert and oriented to person, place and time and in no acute distress  Skin: warm and dry  Head: normocephalic and atraumatic  Eyes: pupils equal, round, and reactive to light, extraocular eye movements intact, conjunctivae normal  Neck: neck supple and non tender without mass   Pulmonary/Chest: clear to auscultation bilaterally- no wheezes, rales or rhonchi, normal air movement, no respiratory distress + 4 L via NC   Cardiovascular: normal rate, normal S1 and S2 and no carotid bruits  Abdomen: soft, non-tender, non-distended, normal bowel sounds, no masses or organomegaly -- ++ wound over ventral hernia without signs of infection/necrosis   Extremities: no cyanosis, no clubbing and no edema  Neurologic: no cranial nerve deficit and speech normal    I/O last 3 completed shifts: In: 180 [P.O.:180]  Out: 325 [Urine:325]  No intake/output data recorded. LABS:  Recent Labs     07/10/22  0953 07/11/22  0518 07/12/22  0141    143 141   K 4.1 3.3* 4.1   CL 95* 101 105   CO2 30* 30* 27   BUN 29* 22 13   CREATININE 1.8* 1.3* 1.1*   GLUCOSE 107* 85 104*   CALCIUM 10.2 8.9 8.3*       Recent Labs     07/10/22  0953 07/11/22  0518 07/12/22  0141   WBC 5.9 3.5* 3.6*   RBC 3.51 3.11* 3.08*   HGB 9.1* 8.0* 8.1*   HCT 31.0* 27.6* 27.6*   MCV 88.3 88.7 89.6   MCH 25.9* 25.7* 26.3   MCHC 29.4* 29.0* 29.3*   RDW 14.4 14.3 14.4   * 127* 126*   MPV 11.8 12.4* 11.4       No results for input(s): POCGLU in the last 72 hours. Imaging:  No results found.     Patient Instructions:      Medication List      CONTINUE taking these medications    * albuterol (2.5 MG/3ML) 0.083% nebulizer solution  Commonly known as: PROVENTIL  Take 3 mLs by nebulization every 6 hours as needed for Wheezing or Shortness of Breath     * albuterol sulfate  (90 Base) MCG/ACT inhaler  Commonly known as: Proventil HFA  Inhale 2 puffs into the lungs every 6 hours as needed for Wheezing or Shortness of Breath     amLODIPine 5 MG tablet  Commonly known as: NORVASC  Take 1 tablet by mouth nightly     apixaban 5 MG Tabs tablet  Commonly known as: Eliquis  Take 1 tablet by mouth 2 times daily     Breo Ellipta 100-25 MCG/INH Aepb inhaler  Generic drug: fluticasone-vilanterol  Inhale 1 puff into the lungs daily     cycloSPORINE 0.05 % ophthalmic emulsion  Commonly known as: Restasis  Place 1 drop into both eyes every 12 hours     desmopressin 0.1 MG tablet  Commonly known as: DDAVP  Take 2 tablets by mouth 2 times daily     docusate sodium 100 MG capsule  Commonly known as: Colace  Take 1 capsule by mouth 2 times daily     escitalopram 10 MG tablet  Commonly known as: LEXAPRO  Take 1 tablet by mouth 2 times daily     famotidine 20 MG tablet  Commonly known as: PEPCID  Take 1 tablet by mouth 2 times daily     ferrous sulfate 325 (65 Fe) MG tablet  Commonly known as: IRON 325  Take 1 tablet by mouth 2 times daily     hydrocortisone 2.5 % Crea rectal cream  Commonly known as: ANUSOL-HC  Apply to affected area BID     levothyroxine 75 MCG tablet  Commonly known as: SYNTHROID  Take 1 tablet by mouth daily     lidocaine 5 %  Commonly known as: LIDODERM  Place 1 patch onto the skin daily for 10 days 12 hours on, 12 hours off. * metoprolol succinate 100 MG extended release tablet  Commonly known as: TOPROL XL  Take 1 tablet by mouth daily     * metoprolol succinate 50 MG extended release tablet  Commonly known as: TOPROL XL  TAKE ONE TABLET EVERY DAY WITH THE 100MG     mupirocin 2 % ointment  Commonly known as: BACTROBAN  Apply topically daily     naloxone 4 MG/0.1ML Liqd nasal spray  1 spray by Nasal route as needed for Opioid Reversal     Nebulizer/Tubing/Mouthpiece Kit  1 kit by Does not apply route daily as needed (wheezing)     Omega 3 1000 MG Caps  Take 1 each by mouth daily     omega-3 acid ethyl esters 1 g capsule  Commonly known as: LOVAZA  Take 1 capsule by mouth 2 times daily     oxyCODONE-acetaminophen 5-325 MG per tablet  Commonly known as: Percocet  Take 1 tablet by mouth every 4 hours as needed for Pain (max: 150/month) for up to 30 days. Start taking on: July 13, 2022     OXYGEN     predniSONE 5 MG tablet  Commonly known as: DELTASONE  Take 1 tablet by mouth daily     promethazine-codeine 6.25-10 MG/5ML syrup  Commonly known as: PHENERGAN with CODEINE  Take 5 mLs by mouth 4 times daily as needed for Cough for up to 60 days. sodium chloride 0.65 % nasal spray  Commonly known as:  [facility] Spray  1 spray by Nasal route as needed for Congestion         * This list has 4 medication(s) that are the same as other medications prescribed for you. Read the directions carefully, and ask your doctor or other care provider to review them with you.               30 minutes was spent in preparing discharge papers, discussing discharge with patient, medication review, etc.    Signed:  Electronically signed by FRIDA Vega NP on 7/12/2022 at 1:47 PM

## 2022-07-12 NOTE — CARE COORDINATION
Social work:    Notified Winifred at [facility] of discharge home today.      Electronically signed by JIGAR Connell on 7/12/2022 at 3:12 PM

## 2022-07-12 NOTE — PROGRESS NOTES
[address] [facility]  [address]         [id number]                                                  Patient Name: Nikia Browne    MRN: [id number]    : 1955    Room: [address] [place] [address]      Evaluating OT: Ascencion Raymond, OTR/L [id number]      Referring Provider:Elijah Cuadra DO    Specific Provider Orders/Date: 2022 OT eval and treat       Diagnosis: proctitis   L ankle deformity from fracture in , R tibia deformity from fracture in     Surgery:   Past Surgical History:   Procedure Laterality Date    ABDOMEN SURGERY      ischemic colitis    COLONOSCOPY      healed colitis    COLONOSCOPY  2014    COLONOSCOPY  2015    severe colitis    COLONOSCOPY  10/24/2016    COLONOSCOPY  2017    ECHO COMPL W DOP COLOR FLOW  2015         ECHO COMPLETE  2013         FRACTURE SURGERY  2010    Tibial right    HEMORRHOID SURGERY  2016    KYPHOSIS SURGERY      For comp.  fx T10    LUMBAR DISC SURGERY      OTHER SURGICAL HISTORY  11    removal r leg external fixator    OTHER SURGICAL HISTORY  2021    Partial Vaginectomy, Endometrial Biopsy    SIGMOIDOSCOPY N/A 3/19/2021    SIGMOIDOSCOPY HEMORRHOID BANDING performed by Derik Casillas MD at [address] / Otelia Clam      application TSF  86    UPPER GASTROINTESTINAL ENDOSCOPY  2016    UPPER GASTROINTESTINAL ENDOSCOPY  2017        Pertinent Medical History:   Past Medical History:   Diagnosis Date    A-fib [facility])     follows with Dr Sigrid Guan to transfer from chair to wheelchair     with assistance    Abnormal mammogram     Acute on chronic respiratory failure ([facility] 2017    Anemia     Anemia due to chronic illness     Ankle fracture, left 2008    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[facility] 2007    Backache     Benign hypertension     CHF (congestive heart failure) (HCC)     Chronic back pain     Chronic kidney disease     stage 3    Chronic pain disorder     Chronic, continuous use of opioids     Compression fracture of thoracic vertebra (HCC)     T10    COPD (chronic obstructive pulmonary disease) ([address]     Debility     Deformity of ankle and foot, acquired 01/31/2011    Depression     Diabetes insipidus ([address]     Diverticulitis     Dry eyes     Encephalopathy acute 05/05/2017    Gait disturbance     GERD (gastroesophageal reflux disease)     Hemorrhoids 01/13/2012    Hernia     History of blood transfusion approx 05/2017    History of Clostridium difficile     negative culture 12-15-15; resolved    Hyperlipidemia     Hypothyroidism     Ischemic colitis, enteritis, or enterocolitis ([address]     Long term (current) use of systemic steroids 7/10/2022    Long-term current use of steroids     Lumbar disc herniation     Myalgia and myositis, unspecified     Nephrosclerosis     Nonunion of fracture 02/22/2011    Osteoarthritis     severe    PAF (paroxysmal atrial fibrillation) ([facility])     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[facility])     PCWP mean:26.0 PA mean: 24.00; denies any recent issues    Pulmonary hypertension ([facility])     ventricular diastolic function consistent with abnormal relaxation (stage I)    Pulmonary sarcoidosis ([facility])     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, f/u w/ PCP    Rectal bleeding     Rhabdomyolysis 05/09/2011    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    Tibia fracture 07/2010    Ventral hernia without obstruction or gangrene 7/10/2022         Past Medical History:   Diagnosis Date    A-fib [facility])     follows with Dr Miranda Zamorano to transfer from chair to wheelchair     with assistance    Abnormal mammogram 2010    Acute on chronic respiratory failure ([address] 05/05/2017    Anemia     Anemia due to chronic illness     Ankle fracture, left 2008    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[facility] [facility] 03/26/2007    Backache     Benign hypertension     CHF (congestive heart failure) ([facility])     Chronic back pain     Chronic kidney disease     stage 3    Chronic pain disorder     Chronic, continuous use of opioids     Compression fracture of thoracic vertebra ([facility])     T10    COPD (chronic obstructive pulmonary disease) ([facility])     Debility     Deformity of ankle and foot, acquired 01/31/2011    Depression     Diabetes insipidus ([address]     Diverticulitis     Dry eyes     Encephalopathy acute 05/05/2017    Gait disturbance     GERD (gastroesophageal reflux disease)     Hemorrhoids 01/13/2012    Hernia     History of blood transfusion approx 05/2017    History of Clostridium difficile     negative culture 12-15-15; resolved    Hyperlipidemia     Hypothyroidism     Ischemic colitis, enteritis, or enterocolitis ([address]     Long term (current) use of systemic steroids 7/10/2022    Long-term current use of steroids     Lumbar disc herniation     Myalgia and myositis, unspecified     Nephrosclerosis     Nonunion of fracture 02/22/2011    Osteoarthritis     severe    PAF (paroxysmal atrial fibrillation) ([facility])     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[facility])     PCWP mean:26.0 PA mean: 24.00; denies any recent issues    Pulmonary hypertension ([facility])     ventricular diastolic function consistent with abnormal relaxation (stage I)    Pulmonary sarcoidosis ([facility])     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, f/u w/ PCP    Rectal bleeding     Rhabdomyolysis 05/09/2011    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  fx T10    LUMBAR DISC SURGERY      OTHER SURGICAL HISTORY  11/1/11    removal r leg external fixator    OTHER SURGICAL HISTORY  12/14/2021    Partial Vaginectomy, Endometrial Biopsy    SIGMOIDOSCOPY N/A 3/19/2021    SIGMOIDOSCOPY HEMORRHOID BANDING performed by Nga Bush MD at [address] [place] [address]      application TSF  6/1/62    UPPER GASTROINTESTINAL ENDOSCOPY  1/4/2016    UPPER GASTROINTESTINAL ENDOSCOPY  07/26/2017      Precautions:  Fall Risk, bed/ chair alarm     Assessment of current deficits    [] Functional mobility  [x]ADLs  [x] Strength               [x]Cognition    [x] Functional transfers   [x] IADLs         [x] Safety Awareness   [x]Endurance    [x] Fine Coordination              [x] Balance      [] Vision/perception   [x]Sensation     []Gross Motor Coordination  [] ROM  [] Delirium                   [] Motor Control     OT PLAN OF CARE   OT POC based on physician orders, patient diagnosis and results of clinical assessment    Frequency/Duration 2-5 days/wk for 2 weeks PRN   Specific OT Treatment Interventions to include:   * Instruction/training on adapted ADL techniques and AE recommendations to increase functional independence within precautions       * Training on energy conservation strategies, correct breathing pattern and techniques to improve independence/tolerance for self-care routine  * Functional transfer/mobility training/DME recommendations for increased independence, safety, and fall prevention  * Patient/Family education to increase follow through with safety techniques and functional independence  * Recommendation of environmental modifications for increased safety with functional transfers/mobility and ADLs  * Therapeutic exercise to improve motor endurance, ROM, and functional strength for ADLs/functional transfers  * Therapeutic activities to facilitate/challenge dynamic balance, stand tolerance for increased safety and independence with ADLs  * Therapeutic activities to facilitate gross/fine motor skills for increased independence with ADLs    Recommended Adaptive Equipment: possible bedside commode     Home Living: Pt lives in a 1 story home with  with a ramped entrance   Pt was tired during assessment, frequently nodding off during mid conversation. Her information on home set up was scattered. She indicated that her  works and she spends the majority of time in her pajamas in bed watching tv. Bathroom setup: pt reported that she sponge bathed with her husbands assistance   Equipment owned: pt indicated she owned a hospital bed, walker, w/c, and power scooter. She was unclear on if they owned a bedside commode    Prior Level of Function: Assist  with ADLs , Assist with IADLs; ambulated infrequently at home for transfers only  Driving: no  Occupation: no    Pain Level: Pt indicated chronic pain all over ;  Cognition: A&O: 2/3; person and place Follows 1 step directions inconsistently       Functional Assessment:  AM-PAC Daily Activity Raw Score: 12/24   Initial Eval Status  Date: 7/12/2022 Treatment Status  Date: STGs = LTGs  Time frame: 10-14 days   Feeding Minimal Assist      Grooming Moderate Assist   Minimal Assist    UB Dressing Moderate Assist   Minimal Assist    LB Dressing Dependent   Minimal Assist    Bathing Maximal Assist  Minimal Assist    Toileting Dependent   Kaitlyn wick and bed pan   Minimal Assist    Bed Mobility  Supine to sit: Minimal Assist   Sit to supine: Minimal Assist   Supine to sit:  Independent   Sit to supine: Independent    Functional Transfers Minimal Assist   Sit to stand   Contact Guard Assist    Functional Mobility Moderate/ Minimal Assist   To side step along EOB  Contact Guard Assist    Balance Sitting:     Static:  Good-    Dynamic:Fair  Standing: Min/ Mod A      Activity Tolerance Fair- for light activity   Good- to assist with self care tasks    Visual/  Perceptual Glasses: yes                Hand Dominance R   AROM (PROM) Strength Additional Info:    RUE  Limited active shld flexion  WFL distal  3+/5 good  and wfl FMC/dexterity noted during ADL tasks       LUE WFL 4-/5 good  and wfl FMC/dexterity noted during ADL tasks       Hearing: WFL   Sensation:  No c/o numbness or tingling   Tone: WFL   Edema: none noted     Comments: Upon arrival patient supine in bed and agreeable to therapy with nursing consent. At end of session, patient supine in bed with alarm on  with call light and phone within reach, all lines and tubes intact. Overall patient demonstrated  decreased independence and safety during completion of ADL/functional transfer/mobility tasks. Pt would benefit from continued skilled OT to increase safety and independence with completion of ADL/IADL tasks for functional independence and quality of life. Treatment: OT treatment provided this date includes:    Instruction/training on safety and adapted techniques for completion of ADLs:     Instruction/training on safe functional mobility/transfer techniques:     Instruction/training on energy conservation/work simplification for completion of ADLs:.           Rehab Potential: Good  for established goals     Patient / Family Goal:  To return home       Patient and/or family were instructed on functional diagnosis, prognosis/goals and OT plan of care. Demonstrated fair understanding.      Eval Complexity: Low    Time In: 1:37  Time Out: 2:05  Total Treatment Time: 10     Min Units   OT Eval Low 09997    1   OT Eval Medium 42615      OT Eval High 84090      OT Re-Eval [id number]       Therapeutic Ex 64804       Therapeutic Activities 72538  10  1   ADL/Self Care 90578       Orthotic Management 43167       Manual 95570     Neuro Re-Ed 70961 Non-Billable Time          Evaluation Time additionally includes thorough review of current medical information, gathering information on past medical history/social history and prior level of function, interpretation of standardized testing/informal observation of tasks, assessment of data and development of plan of care and goals.           Tommy Jamison, OTR/L [id number]

## 2022-07-12 NOTE — CARE COORDINATION
D/c order noted; Colleen Wood orders on chart. [facility] notified of discharge. Sonny Alvarez

## 2022-07-13 ENCOUNTER — CARE COORDINATION (OUTPATIENT)
Dept: CASE MANAGEMENT | Age: 67
End: 2022-07-13

## 2022-07-13 NOTE — CARE COORDINATION
[address] Transitions Initial Follow Up Call    Call within 2 business days of discharge: Yes    Patient: Ny Galaviz Patient : 1955   MRN: [id number]  Reason for Admission: pneumonia  Discharge Date: 22 RARS: Readmission Risk Score: 20.3 ( )      Last Discharge [facility]       Complaint Diagnosis Description Type Department Provider    7/10/22 Other Pneumonia due to infectious organism, unspecified laterality, unspecified part of lung . .. ED to Hosp-Admission (Discharged) (ADMITTED) [facility] Caleb Pal DO; Heron Benz. ..        -First attempt to reach the patient for subsequent Care Transition call. Message left on home phone with CTN's contact information requesting return phone call.  Mobile phone-VM box full, unable to leave message.  -Spoke with Yana at [facility] are in and [facility] will be tomorrow(22.)          Follow Up  Future Appointments   Date Time Provider Mauri Sanford   2022  3:00 PM Yakov Ybarra MD [facility]   2022  1:30 PM Marcello Ambriz MD [address] [facility] Asuncion Dye RN

## 2022-07-14 ENCOUNTER — TELEPHONE (OUTPATIENT)
Dept: OTHER | Facility: CLINIC | Age: 67
End: 2022-07-14

## 2022-07-14 ENCOUNTER — CARE COORDINATION (OUTPATIENT)
Dept: CASE MANAGEMENT | Age: 67
End: 2022-07-14

## 2022-07-14 ENCOUNTER — APPOINTMENT (OUTPATIENT)
Dept: CT IMAGING | Age: 67
DRG: 291 | End: 2022-07-14
Payer: MEDICARE

## 2022-07-14 ENCOUNTER — HOSPITAL ENCOUNTER (INPATIENT)
Age: 67
LOS: 4 days | Discharge: HOME HEALTH | DRG: 291 | End: 2022-07-18
Attending: STUDENT IN AN ORGANIZED HEALTH CARE EDUCATION/TRAINING PROGRAM | Admitting: INTERNAL MEDICINE
Payer: MEDICARE

## 2022-07-14 DIAGNOSIS — J44.1 COPD EXACERBATION (HCC): ICD-10-CM

## 2022-07-14 DIAGNOSIS — I48.91 ATRIAL FIBRILLATION, UNSPECIFIED TYPE (HCC): ICD-10-CM

## 2022-07-14 DIAGNOSIS — J90 BILATERAL PLEURAL EFFUSION: Primary | ICD-10-CM

## 2022-07-14 DIAGNOSIS — R06.02 SHORTNESS OF BREATH: ICD-10-CM

## 2022-07-14 DIAGNOSIS — I50.9 ACUTE CONGESTIVE HEART FAILURE, UNSPECIFIED HEART FAILURE TYPE (HCC): ICD-10-CM

## 2022-07-14 PROBLEM — I50.41 ACUTE COMBINED SYSTOLIC AND DIASTOLIC CHF, NYHA CLASS 1 (HCC): Status: ACTIVE | Noted: 2022-07-14

## 2022-07-14 PROBLEM — J18.9 PNEUMONIA: Status: RESOLVED | Noted: 2022-07-10 | Resolved: 2022-07-14

## 2022-07-14 LAB
ALBUMIN SERPL-MCNC: 3.6 G/DL (ref 3.5–5.2)
ALP BLD-CCNC: 150 U/L (ref 35–104)
ALT SERPL-CCNC: 68 U/L (ref 0–32)
ANION GAP SERPL CALCULATED.3IONS-SCNC: 12 MMOL/L (ref 7–16)
AST SERPL-CCNC: 81 U/L (ref 0–31)
BASOPHILIC STIPPLING: ABNORMAL
BASOPHILS ABSOLUTE: 0.01 E9/L (ref 0–0.2)
BASOPHILS RELATIVE PERCENT: 0.2 % (ref 0–2)
BILIRUB SERPL-MCNC: 0.3 MG/DL (ref 0–1.2)
BUN BLDV-MCNC: 14 MG/DL (ref 6–23)
CALCIUM SERPL-MCNC: 9.4 MG/DL (ref 8.6–10.2)
CHLORIDE BLD-SCNC: 103 MMOL/L (ref 98–107)
CO2: 26 MMOL/L (ref 22–29)
CREAT SERPL-MCNC: 1.1 MG/DL (ref 0.5–1)
EOSINOPHILS ABSOLUTE: 0.19 E9/L (ref 0.05–0.5)
EOSINOPHILS RELATIVE PERCENT: 4.3 % (ref 0–6)
GFR AFRICAN AMERICAN: >60
GFR NON-AFRICAN AMERICAN: >60 ML/MIN/1.73
GLUCOSE BLD-MCNC: 98 MG/DL (ref 74–99)
HCT VFR BLD CALC: 30.6 % (ref 34–48)
HEMOGLOBIN: 8.9 G/DL (ref 11.5–15.5)
HYPOCHROMIA: ABNORMAL
IMMATURE GRANULOCYTES #: 0.02 E9/L
IMMATURE GRANULOCYTES %: 0.5 % (ref 0–5)
LACTIC ACID: 1 MMOL/L (ref 0.5–2.2)
LIPASE: 13 U/L (ref 13–60)
LYMPHOCYTES ABSOLUTE: 0.27 E9/L (ref 1.5–4)
LYMPHOCYTES RELATIVE PERCENT: 6.1 % (ref 20–42)
MCH RBC QN AUTO: 25.9 PG (ref 26–35)
MCHC RBC AUTO-ENTMCNC: 29.1 % (ref 32–34.5)
MCV RBC AUTO: 89.2 FL (ref 80–99.9)
MONOCYTES ABSOLUTE: 0.19 E9/L (ref 0.1–0.95)
MONOCYTES RELATIVE PERCENT: 4.3 % (ref 2–12)
NEUTROPHILS ABSOLUTE: 3.74 E9/L (ref 1.8–7.3)
NEUTROPHILS RELATIVE PERCENT: 84.6 % (ref 43–80)
OVALOCYTES: ABNORMAL
PDW BLD-RTO: 14.5 FL (ref 11.5–15)
PLATELET # BLD: 182 E9/L (ref 130–450)
PMV BLD AUTO: 11 FL (ref 7–12)
POIKILOCYTES: ABNORMAL
POLYCHROMASIA: ABNORMAL
POTASSIUM REFLEX MAGNESIUM: 3.8 MMOL/L (ref 3.5–5)
PRO-BNP: 7009 PG/ML (ref 0–125)
RBC # BLD: 3.43 E12/L (ref 3.5–5.5)
SARS-COV-2, NAAT: NOT DETECTED
SODIUM BLD-SCNC: 141 MMOL/L (ref 132–146)
TOTAL PROTEIN: 6.4 G/DL (ref 6.4–8.3)
TROPONIN, HIGH SENSITIVITY: 37 NG/L (ref 0–9)
TROPONIN, HIGH SENSITIVITY: 43 NG/L (ref 0–9)
WBC # BLD: 4.4 E9/L (ref 4.5–11.5)

## 2022-07-14 PROCEDURE — 99285 EMERGENCY DEPT VISIT HI MDM: CPT

## 2022-07-14 PROCEDURE — 84145 PROCALCITONIN (PCT): CPT

## 2022-07-14 PROCEDURE — 86140 C-REACTIVE PROTEIN: CPT

## 2022-07-14 PROCEDURE — 94640 AIRWAY INHALATION TREATMENT: CPT

## 2022-07-14 PROCEDURE — 83690 ASSAY OF LIPASE: CPT

## 2022-07-14 PROCEDURE — 2700000000 HC OXYGEN THERAPY PER DAY

## 2022-07-14 PROCEDURE — 6370000000 HC RX 637 (ALT 250 FOR IP)

## 2022-07-14 PROCEDURE — 72125 CT NECK SPINE W/O DYE: CPT

## 2022-07-14 PROCEDURE — 99223 1ST HOSP IP/OBS HIGH 75: CPT | Performed by: INTERNAL MEDICINE

## 2022-07-14 PROCEDURE — 87635 SARS-COV-2 COVID-19 AMP PRB: CPT

## 2022-07-14 PROCEDURE — 70450 CT HEAD/BRAIN W/O DYE: CPT

## 2022-07-14 PROCEDURE — 74177 CT ABD & PELVIS W/CONTRAST: CPT

## 2022-07-14 PROCEDURE — 71275 CT ANGIOGRAPHY CHEST: CPT

## 2022-07-14 PROCEDURE — 83605 ASSAY OF LACTIC ACID: CPT

## 2022-07-14 PROCEDURE — 6360000004 HC RX CONTRAST MEDICATION: Performed by: RADIOLOGY

## 2022-07-14 PROCEDURE — 6370000000 HC RX 637 (ALT 250 FOR IP): Performed by: STUDENT IN AN ORGANIZED HEALTH CARE EDUCATION/TRAINING PROGRAM

## 2022-07-14 PROCEDURE — 85025 COMPLETE CBC W/AUTO DIFF WBC: CPT

## 2022-07-14 PROCEDURE — 84484 ASSAY OF TROPONIN QUANT: CPT

## 2022-07-14 PROCEDURE — 2060000000 HC ICU INTERMEDIATE R&B

## 2022-07-14 PROCEDURE — 83880 ASSAY OF NATRIURETIC PEPTIDE: CPT

## 2022-07-14 PROCEDURE — 93005 ELECTROCARDIOGRAM TRACING: CPT | Performed by: STUDENT IN AN ORGANIZED HEALTH CARE EDUCATION/TRAINING PROGRAM

## 2022-07-14 PROCEDURE — 80053 COMPREHEN METABOLIC PANEL: CPT

## 2022-07-14 PROCEDURE — 85651 RBC SED RATE NONAUTOMATED: CPT

## 2022-07-14 RX ORDER — FUROSEMIDE 10 MG/ML
20 INJECTION INTRAMUSCULAR; INTRAVENOUS ONCE
Status: COMPLETED | OUTPATIENT
Start: 2022-07-14 | End: 2022-07-15

## 2022-07-14 RX ORDER — DOCUSATE SODIUM 100 MG/1
100 CAPSULE, LIQUID FILLED ORAL 2 TIMES DAILY
Status: DISCONTINUED | OUTPATIENT
Start: 2022-07-14 | End: 2022-07-18 | Stop reason: HOSPADM

## 2022-07-14 RX ORDER — IPRATROPIUM BROMIDE AND ALBUTEROL SULFATE 2.5; .5 MG/3ML; MG/3ML
1 SOLUTION RESPIRATORY (INHALATION)
Status: DISCONTINUED | OUTPATIENT
Start: 2022-07-15 | End: 2022-07-18 | Stop reason: HOSPADM

## 2022-07-14 RX ORDER — OXYCODONE HYDROCHLORIDE AND ACETAMINOPHEN 5; 325 MG/1; MG/1
1 TABLET ORAL ONCE
Status: COMPLETED | OUTPATIENT
Start: 2022-07-14 | End: 2022-07-14

## 2022-07-14 RX ORDER — BUDESONIDE 0.25 MG/2ML
250 INHALANT ORAL 2 TIMES DAILY
Status: DISCONTINUED | OUTPATIENT
Start: 2022-07-14 | End: 2022-07-18 | Stop reason: HOSPADM

## 2022-07-14 RX ORDER — IPRATROPIUM BROMIDE AND ALBUTEROL SULFATE 2.5; .5 MG/3ML; MG/3ML
1 SOLUTION RESPIRATORY (INHALATION)
Status: ACTIVE | OUTPATIENT
Start: 2022-07-14 | End: 2022-07-14

## 2022-07-14 RX ORDER — OXYCODONE HYDROCHLORIDE AND ACETAMINOPHEN 5; 325 MG/1; MG/1
1 TABLET ORAL EVERY 6 HOURS PRN
Status: DISCONTINUED | OUTPATIENT
Start: 2022-07-14 | End: 2022-07-18 | Stop reason: HOSPADM

## 2022-07-14 RX ORDER — PREDNISONE 1 MG/1
5 TABLET ORAL DAILY
Status: DISCONTINUED | OUTPATIENT
Start: 2022-07-15 | End: 2022-07-18 | Stop reason: HOSPADM

## 2022-07-14 RX ORDER — ESCITALOPRAM OXALATE 10 MG/1
10 TABLET ORAL 2 TIMES DAILY
Status: DISCONTINUED | OUTPATIENT
Start: 2022-07-14 | End: 2022-07-18 | Stop reason: HOSPADM

## 2022-07-14 RX ORDER — AMLODIPINE BESYLATE 5 MG/1
5 TABLET ORAL NIGHTLY
Status: DISCONTINUED | OUTPATIENT
Start: 2022-07-14 | End: 2022-07-15

## 2022-07-14 RX ORDER — LEVOTHYROXINE SODIUM 0.07 MG/1
75 TABLET ORAL DAILY
Status: DISCONTINUED | OUTPATIENT
Start: 2022-07-15 | End: 2022-07-18 | Stop reason: HOSPADM

## 2022-07-14 RX ORDER — SODIUM CHLORIDE 9 MG/ML
1000 INJECTION, SOLUTION INTRAVENOUS CONTINUOUS
Status: DISCONTINUED | OUTPATIENT
Start: 2022-07-14 | End: 2022-07-14

## 2022-07-14 RX ORDER — FAMOTIDINE 20 MG/1
20 TABLET, FILM COATED ORAL 2 TIMES DAILY
Status: DISCONTINUED | OUTPATIENT
Start: 2022-07-14 | End: 2022-07-18 | Stop reason: HOSPADM

## 2022-07-14 RX ORDER — ARFORMOTEROL TARTRATE 15 UG/2ML
15 SOLUTION RESPIRATORY (INHALATION) 2 TIMES DAILY
Status: DISCONTINUED | OUTPATIENT
Start: 2022-07-14 | End: 2022-07-18 | Stop reason: HOSPADM

## 2022-07-14 RX ORDER — DESMOPRESSIN ACETATE 0.1 MG/1
200 TABLET ORAL 2 TIMES DAILY
Status: DISCONTINUED | OUTPATIENT
Start: 2022-07-15 | End: 2022-07-18 | Stop reason: HOSPADM

## 2022-07-14 RX ADMIN — IPRATROPIUM BROMIDE AND ALBUTEROL SULFATE 1 AMPULE: .5; 2.5 SOLUTION RESPIRATORY (INHALATION) at 20:37

## 2022-07-14 RX ADMIN — IOPAMIDOL 75 ML: 755 INJECTION, SOLUTION INTRAVENOUS at 21:07

## 2022-07-14 RX ADMIN — IPRATROPIUM BROMIDE AND ALBUTEROL SULFATE 1 AMPULE: .5; 2.5 SOLUTION RESPIRATORY (INHALATION) at 20:20

## 2022-07-14 RX ADMIN — IPRATROPIUM BROMIDE AND ALBUTEROL SULFATE 1 AMPULE: .5; 2.5 SOLUTION RESPIRATORY (INHALATION) at 20:30

## 2022-07-14 RX ADMIN — OXYCODONE AND ACETAMINOPHEN 1 TABLET: 5; 325 TABLET ORAL at 22:23

## 2022-07-14 ASSESSMENT — PAIN DESCRIPTION - ONSET: ONSET: GRADUAL

## 2022-07-14 ASSESSMENT — ENCOUNTER SYMPTOMS
ABDOMINAL DISTENTION: 0
COUGH: 1
PHOTOPHOBIA: 0
BACK PAIN: 0
SORE THROAT: 0
DIARRHEA: 0
CONSTIPATION: 1
ABDOMINAL PAIN: 1
BLOOD IN STOOL: 0
CHEST TIGHTNESS: 1
SHORTNESS OF BREATH: 1

## 2022-07-14 ASSESSMENT — PAIN SCALES - GENERAL
PAINLEVEL_OUTOF10: 8
PAINLEVEL_OUTOF10: 8

## 2022-07-14 ASSESSMENT — PAIN DESCRIPTION - LOCATION
LOCATION: BACK;CHEST
LOCATION: BACK

## 2022-07-14 ASSESSMENT — PAIN DESCRIPTION - FREQUENCY: FREQUENCY: CONTINUOUS

## 2022-07-14 ASSESSMENT — PAIN DESCRIPTION - DESCRIPTORS: DESCRIPTORS: ACHING;DISCOMFORT

## 2022-07-14 ASSESSMENT — PAIN DESCRIPTION - PAIN TYPE: TYPE: ACUTE PAIN

## 2022-07-14 ASSESSMENT — PAIN - FUNCTIONAL ASSESSMENT: PAIN_FUNCTIONAL_ASSESSMENT: 0-10

## 2022-07-14 NOTE — CARE COORDINATION
phone with CTN-91%. CTN advised to maintain greater that 90% and course of action to take if oximeter maintains below 90%    CTN provided contact information. Plan for follow-up call in 5-7 days based on severity of symptoms and risk factors. Plan for next call: self management-oximeter readings  follow up appointment-PCP/general surgery dates  active with [facility]? Non-face-to-face services provided:  Scheduled appointment with PCP-CTN explained to patient recommendation to have contact with PCP 1-2 weeks post hospital discharge. CTN will route to PCP office to inform  Scheduled appointment with Specialist-CTN informed patient of need to schedule with general surgery(Dr Sandra)as per AVS  Obtained and reviewed discharge summary and/or continuity of care documents    Care Transitions 24 Hour Call    Do you have a copy of your discharge instructions?: Yes  Do you have all of your prescriptions and are they filled?: Yes  Have you been contacted by a [facility] Pharmacist?: No  Have you scheduled your follow up appointment?: No  Do you feel like you have everything you need to keep you well at home?: Yes  Care Transitions Interventions    Pharmacist: Donnell Shoemaker with patient for initial care transition call post hospital discharge. Able to reach patient on her mobile number. Patient requests any calls to be to her mobile phone.  -Patient admitted to [facility] on 7/10 for pneumonia. Patient's only complaint upon admission was her chronic aching body wide pain.  -Patient reports today that she is \"doing pretty good\" though she does admit to rolling out of her home hospital bed the night she got home(7/12/22.)  Patient states she sustained no injury.  -Patient states she is having some slight stomach discomfort today but no other pain. Slept well last night, appetite fair, normal bladder/bowel pattern.  -Patient states her  provides transportation to any appointments.   Patient's  also manages her appointments. Patient states her  did receive a call from [facility]. -CTN informed patient of negative covid-19 testing done on 7/10/22. Patient vaccinated x 2 with Parvin Bragg and booster x 1.  -Patient denies any needs, questions, or concerns at this time and is agreeable to continued follow up from CTN.         Follow Up  Future Appointments   Date Time Provider Mauri Sanford   8/15/2022  3:00 PM [facility]   8/25/2022  1:30 PM Cayla Brown MD [facility]       Edvin Zaragoza RN

## 2022-07-14 NOTE — Clinical Note
Discharge Plan[de-identified] Other/[facility])   Telemetry/Cardiac Monitoring Required?: Yes

## 2022-07-15 PROBLEM — J90 BILATERAL PLEURAL EFFUSION: Status: ACTIVE | Noted: 2022-07-15

## 2022-07-15 LAB
ADENOVIRUS BY PCR: NOT DETECTED
BLOOD CULTURE, ROUTINE: NORMAL
BORDETELLA PARAPERTUSSIS BY PCR: NOT DETECTED
BORDETELLA PERTUSSIS BY PCR: NOT DETECTED
C-REACTIVE PROTEIN: 8 MG/DL (ref 0–0.4)
CHLAMYDOPHILIA PNEUMONIAE BY PCR: NOT DETECTED
CORONAVIRUS 229E BY PCR: NOT DETECTED
CORONAVIRUS HKU1 BY PCR: NOT DETECTED
CORONAVIRUS NL63 BY PCR: NOT DETECTED
CORONAVIRUS OC43 BY PCR: NOT DETECTED
CULTURE, BLOOD 2: NORMAL
EKG ATRIAL RATE: 182 BPM
EKG Q-T INTERVAL: 390 MS
EKG QRS DURATION: 80 MS
EKG QTC CALCULATION (BAZETT): 515 MS
EKG R AXIS: 37 DEGREES
EKG T AXIS: -99 DEGREES
EKG VENTRICULAR RATE: 105 BPM
HUMAN METAPNEUMOVIRUS BY PCR: NOT DETECTED
HUMAN RHINOVIRUS/ENTEROVIRUS BY PCR: NOT DETECTED
INFLUENZA A BY PCR: NOT DETECTED
INFLUENZA B BY PCR: NOT DETECTED
METER GLUCOSE: 164 MG/DL (ref 74–99)
MYCOPLASMA PNEUMONIAE BY PCR: NOT DETECTED
PARAINFLUENZA VIRUS 1 BY PCR: NOT DETECTED
PARAINFLUENZA VIRUS 2 BY PCR: NOT DETECTED
PARAINFLUENZA VIRUS 3 BY PCR: NOT DETECTED
PARAINFLUENZA VIRUS 4 BY PCR: NOT DETECTED
PROCALCITONIN: 0.17 NG/ML (ref 0–0.08)
RESPIRATORY SYNCYTIAL VIRUS BY PCR: NOT DETECTED
SARS-COV-2, PCR: NOT DETECTED
SEDIMENTATION RATE, ERYTHROCYTE: 20 MM/HR (ref 0–20)

## 2022-07-15 PROCEDURE — 99232 SBSQ HOSP IP/OBS MODERATE 35: CPT | Performed by: INTERNAL MEDICINE

## 2022-07-15 PROCEDURE — 6360000002 HC RX W HCPCS: Performed by: NURSE PRACTITIONER

## 2022-07-15 PROCEDURE — 94640 AIRWAY INHALATION TREATMENT: CPT

## 2022-07-15 PROCEDURE — 97535 SELF CARE MNGMENT TRAINING: CPT

## 2022-07-15 PROCEDURE — 97165 OT EVAL LOW COMPLEX 30 MIN: CPT

## 2022-07-15 PROCEDURE — 99221 1ST HOSP IP/OBS SF/LOW 40: CPT | Performed by: NURSE PRACTITIONER

## 2022-07-15 PROCEDURE — 6370000000 HC RX 637 (ALT 250 FOR IP): Performed by: NURSE PRACTITIONER

## 2022-07-15 PROCEDURE — 97530 THERAPEUTIC ACTIVITIES: CPT

## 2022-07-15 PROCEDURE — 6370000000 HC RX 637 (ALT 250 FOR IP): Performed by: INTERNAL MEDICINE

## 2022-07-15 PROCEDURE — 94660 CPAP INITIATION&MGMT: CPT

## 2022-07-15 PROCEDURE — 6360000002 HC RX W HCPCS

## 2022-07-15 PROCEDURE — 99223 1ST HOSP IP/OBS HIGH 75: CPT | Performed by: INTERNAL MEDICINE

## 2022-07-15 PROCEDURE — 6360000002 HC RX W HCPCS: Performed by: INTERNAL MEDICINE

## 2022-07-15 PROCEDURE — 0202U NFCT DS 22 TRGT SARS-COV-2: CPT

## 2022-07-15 PROCEDURE — 82962 GLUCOSE BLOOD TEST: CPT

## 2022-07-15 PROCEDURE — 97161 PT EVAL LOW COMPLEX 20 MIN: CPT

## 2022-07-15 PROCEDURE — 2700000000 HC OXYGEN THERAPY PER DAY

## 2022-07-15 PROCEDURE — 2060000000 HC ICU INTERMEDIATE R&B

## 2022-07-15 RX ORDER — GUAIFENESIN 400 MG/1
400 TABLET ORAL 4 TIMES DAILY PRN
Status: DISCONTINUED | OUTPATIENT
Start: 2022-07-15 | End: 2022-07-16

## 2022-07-15 RX ORDER — FUROSEMIDE 10 MG/ML
40 INJECTION INTRAMUSCULAR; INTRAVENOUS 2 TIMES DAILY
Status: DISCONTINUED | OUTPATIENT
Start: 2022-07-15 | End: 2022-07-18

## 2022-07-15 RX ORDER — METHYLPREDNISOLONE SODIUM SUCCINATE 125 MG/2ML
60 INJECTION, POWDER, LYOPHILIZED, FOR SOLUTION INTRAMUSCULAR; INTRAVENOUS ONCE
Status: COMPLETED | OUTPATIENT
Start: 2022-07-15 | End: 2022-07-15

## 2022-07-15 RX ADMIN — DESMOPRESSIN ACETATE 200 MCG: 0.1 TABLET ORAL at 19:53

## 2022-07-15 RX ADMIN — OXYCODONE AND ACETAMINOPHEN 1 TABLET: 5; 325 TABLET ORAL at 06:39

## 2022-07-15 RX ADMIN — IPRATROPIUM BROMIDE AND ALBUTEROL SULFATE 1 AMPULE: .5; 2.5 SOLUTION RESPIRATORY (INHALATION) at 07:43

## 2022-07-15 RX ADMIN — DESMOPRESSIN ACETATE 200 MCG: 0.1 TABLET ORAL at 09:38

## 2022-07-15 RX ADMIN — DILTIAZEM HYDROCHLORIDE 30 MG: 30 TABLET, FILM COATED ORAL at 17:40

## 2022-07-15 RX ADMIN — FUROSEMIDE 20 MG: 10 INJECTION INTRAMUSCULAR; INTRAVENOUS at 00:51

## 2022-07-15 RX ADMIN — PETROLATUM: 420 OINTMENT TOPICAL at 19:57

## 2022-07-15 RX ADMIN — GUAIFENESIN 400 MG: 400 TABLET ORAL at 22:13

## 2022-07-15 RX ADMIN — FUROSEMIDE 40 MG: 10 INJECTION, SOLUTION INTRAMUSCULAR; INTRAVENOUS at 11:59

## 2022-07-15 RX ADMIN — LEVOTHYROXINE SODIUM 75 MCG: 75 TABLET ORAL at 06:26

## 2022-07-15 RX ADMIN — ESCITALOPRAM OXALATE 10 MG: 10 TABLET ORAL at 00:50

## 2022-07-15 RX ADMIN — IPRATROPIUM BROMIDE AND ALBUTEROL SULFATE 1 AMPULE: .5; 2.5 SOLUTION RESPIRATORY (INHALATION) at 16:04

## 2022-07-15 RX ADMIN — ESCITALOPRAM OXALATE 10 MG: 10 TABLET ORAL at 19:54

## 2022-07-15 RX ADMIN — FAMOTIDINE 20 MG: 20 TABLET ORAL at 00:50

## 2022-07-15 RX ADMIN — FAMOTIDINE 20 MG: 20 TABLET ORAL at 19:54

## 2022-07-15 RX ADMIN — IPRATROPIUM BROMIDE AND ALBUTEROL SULFATE 1 AMPULE: .5; 2.5 SOLUTION RESPIRATORY (INHALATION) at 20:16

## 2022-07-15 RX ADMIN — DOCUSATE SODIUM 100 MG: 100 CAPSULE, LIQUID FILLED ORAL at 19:54

## 2022-07-15 RX ADMIN — BUDESONIDE 250 MCG: 0.25 SUSPENSION RESPIRATORY (INHALATION) at 07:43

## 2022-07-15 RX ADMIN — METHYLPREDNISOLONE SODIUM SUCCINATE 60 MG: 125 INJECTION, POWDER, LYOPHILIZED, FOR SOLUTION INTRAMUSCULAR; INTRAVENOUS at 06:25

## 2022-07-15 RX ADMIN — ESCITALOPRAM OXALATE 10 MG: 10 TABLET ORAL at 09:38

## 2022-07-15 RX ADMIN — DESMOPRESSIN ACETATE 200 MCG: 0.1 TABLET ORAL at 00:56

## 2022-07-15 RX ADMIN — METOPROLOL SUCCINATE 150 MG: 100 TABLET, FILM COATED, EXTENDED RELEASE ORAL at 19:53

## 2022-07-15 RX ADMIN — ARFORMOTEROL TARTRATE 15 MCG: 15 SOLUTION RESPIRATORY (INHALATION) at 07:43

## 2022-07-15 RX ADMIN — DOCUSATE SODIUM 100 MG: 100 CAPSULE, LIQUID FILLED ORAL at 00:50

## 2022-07-15 RX ADMIN — DOCUSATE SODIUM 100 MG: 100 CAPSULE, LIQUID FILLED ORAL at 09:38

## 2022-07-15 RX ADMIN — DILTIAZEM HYDROCHLORIDE 30 MG: 30 TABLET, FILM COATED ORAL at 12:39

## 2022-07-15 RX ADMIN — FAMOTIDINE 20 MG: 20 TABLET ORAL at 09:38

## 2022-07-15 RX ADMIN — OXYCODONE AND ACETAMINOPHEN 1 TABLET: 5; 325 TABLET ORAL at 17:47

## 2022-07-15 RX ADMIN — PREDNISONE 5 MG: 5 TABLET ORAL at 09:38

## 2022-07-15 RX ADMIN — BUDESONIDE 250 MCG: 0.25 SUSPENSION RESPIRATORY (INHALATION) at 20:16

## 2022-07-15 RX ADMIN — ARFORMOTEROL TARTRATE 15 MCG: 15 SOLUTION RESPIRATORY (INHALATION) at 20:16

## 2022-07-15 RX ADMIN — IPRATROPIUM BROMIDE AND ALBUTEROL SULFATE 1 AMPULE: .5; 2.5 SOLUTION RESPIRATORY (INHALATION) at 12:20

## 2022-07-15 RX ADMIN — FUROSEMIDE 40 MG: 10 INJECTION, SOLUTION INTRAMUSCULAR; INTRAVENOUS at 17:40

## 2022-07-15 RX ADMIN — METOPROLOL SUCCINATE 150 MG: 100 TABLET, FILM COATED, EXTENDED RELEASE ORAL at 00:49

## 2022-07-15 ASSESSMENT — PAIN SCALES - GENERAL
PAINLEVEL_OUTOF10: 0
PAINLEVEL_OUTOF10: 10
PAINLEVEL_OUTOF10: 0

## 2022-07-15 ASSESSMENT — PAIN DESCRIPTION - PAIN TYPE: TYPE: CHRONIC PAIN

## 2022-07-15 ASSESSMENT — PAIN DESCRIPTION - LOCATION: LOCATION: BACK

## 2022-07-15 ASSESSMENT — PAIN SCALES - WONG BAKER
WONGBAKER_NUMERICALRESPONSE: 0
WONGBAKER_NUMERICALRESPONSE: 0

## 2022-07-15 ASSESSMENT — PAIN DESCRIPTION - ONSET: ONSET: ON-GOING

## 2022-07-15 ASSESSMENT — PAIN DESCRIPTION - FREQUENCY: FREQUENCY: CONTINUOUS

## 2022-07-15 ASSESSMENT — PAIN DESCRIPTION - ORIENTATION: ORIENTATION: LOWER

## 2022-07-15 ASSESSMENT — LIFESTYLE VARIABLES: HOW OFTEN DO YOU HAVE A DRINK CONTAINING ALCOHOL: NEVER

## 2022-07-15 ASSESSMENT — PAIN - FUNCTIONAL ASSESSMENT: PAIN_FUNCTIONAL_ASSESSMENT: PREVENTS OR INTERFERES SOME ACTIVE ACTIVITIES AND ADLS

## 2022-07-15 ASSESSMENT — PAIN DESCRIPTION - DESCRIPTORS: DESCRIPTORS: ACHING;DISCOMFORT

## 2022-07-15 NOTE — HOME CARE
Patient is an active referral with [facility] for skilled nursing, pt, ot. Will need ALL NEW HOME CARE ORDERS at discharge.  Blas Thomas lpn

## 2022-07-15 NOTE — CONSULTS
Inpatient Cardiology Consultation      Reason for Consult:  atrial fibrillation    Consulting Physician: Dr. Jung Bourne    Requesting Physician:  Dr. Omer Granda    Date of Consultation: 7/15/2022    HISTORY OF PRESENT ILLNESS:   She is known to [facility], age 73. She will be followed by Dr. Keira Chaudhry. She has a history of diabetes insipidus, pulmonary embolism, atrial fib flutter and refuses anticoagulation, pulmonary and cutaneous sarcoid and CHF with preserved ejection fraction. She wears home oxygen. She had a recent admission and was just discharged 7/12/22, see notes below. She presented to ED yesterday with dyspnea. She tried increasing her home oxygen from 4 to 8 L with no relief. She had a chest pressure that she states is fairly new but she is unable to give me a lot of details. She had no swelling of the lower extremities or palpitations. Bp on admission 134/83, apical 117, 92% on 8l nc    CXR not done but CTA of chest showed no PE, pulmonary arterial HTN, enlarged right heart chambers, no aneurysm or dissection, trace pleural effusions. CT head unremarkable. K was 3.8, BUN and creatinine 14, 1.1, bnp 7009, troponin 43-37, WBC4.4,HH 8.9 and 30.6 as on last admission,     EKG-atrial fibrillation, inferiolateral ischemia, t wave inversions inf-anteriorly. EKG is not available for our review. She was treated with Lasix 20 mg IV, once, and solumedrol. I and 0 minus 800cc. She remains short of breath. A 2d echo has been ordered. Medical History:  Admitted on 10/30/2016 with  rectal bleeding, status post colonoscopy 10/26/16, associated with right lower quadrant abdominal pain. Sodium = 141, potassium 5.2. BUN 19, creatinine 1.4. Albumin 3.9. WBC= 4.8, H/H=8/24.6, and  platelet count of 386. EKG >>NSR with poor R-wave progression.   Lexiscan  stress test, 10/04/16, with no stress-induced ischemia  EF =76%  Echocardiogram, 08/15/2016,  stage I diastolic dysfunction, moderate pulmonary hypertension, mild TR, EF of 65%. No wall-motion abnormality and mild LVH. PMH: sarcoidosis by open lung biopsy 2007,   diabetes mellitus insipidus,   chronic kidney disease,   hypothyroidism,   depression,   Hypertension  GI bleeding  GERD  Pulmonary embolism  New  Dx AF/Aflutter 11/3/2016 . [id number] score 4 (9.27% risk for a stroke)-per [facility]. Cardiac MRI: 11/3/2016 Normal examination; good wall motion and 71% left ventricular EF. S/P complex hemorrhoid repair, Dr. Kimo Goddard, 12/06/2016. Echo, 01/16/2017, EF normal.  Stage 2 diastolic dysfunction. Mild LA dilatation. Mild MR and TR. TR velocity 4.56 m/s. Consistent with severe pulmonary hypertension. C/ [address] 08/30/17 for consultation for pulmonary and cutaneous sarcoidosis per Dr. Mary Liu  The [facility] options include : try to enrol her in [facility] anti-fibrotic trial for non-IPF fibrotic lung disease; and test her for small fiber neuropathy for potential IVIG infusion. Admission 11/20/2017  for severe dyspnea. EKG : Afib RVR rate 126, CXR :pulmonary edema and left  pleural effusion, pBNP= 3095, K 3.3, creatinine 0.9, Mg 1.5, trop <0.01 x 3 sets. Patient was administered 1 L IVF, Cardizem 15 mg IVP  OP cardiac follow-up, 12/18/2017. Dyspnea back at baseline. EKG showed NSR 72/m. On apixaban at hospital discharge, 11/22/2017. Hospitalized 02/2018 with acute on chronic respiratory failure/hypoxia Rx O2 and BiPAP  Hospitalized for dyspnea  - lung infiltrate 05/05 to 05/08/2018. proBNP 342. CXR >atelectasis or pneumonia. Patchy infiltrates in both lung fields. Hospitalized 09/02/19 after a fall. Had ankle edema. No fractures. hospitalized     11/11/2019 through 11/14/2019 after presenting with chest pain and coughing.  sodium of 125   OP cardiac assessment 12/18/2019 O2 per nasal cannula. Off anticoagulation due to epistaxis  2d e cho 6/16/20 EF 60   Summary   Study completed for sarcoidosis and pulmonary hypertension. There is evidence for pulmonary hypertension. There is no evidence for cardiac sarcoidosis on this study. Concentric remodeling. Normal left ventricular systolic function. No wall motion abnormalities or thinning in a non coronary distribution   that would be consistent with cardiac sarcoidosis. Normal diastolic function. Normal left atrial pressure. Mild right ventricular systolic dysfunction. TAPSE is 1.2 cm, TDI s' is 9 cm/s, and the RV free wall is mildly   dysfunctional.   RVSP is 69 mm Hg. Mildly redundant mitral valve leaflets. Mild mitral regurgitation. Mildly redundant tricuspid valve leaflets. Mild to moderate tricuspid valve. Central regurgitant jet. Small inferior pericardial effusion. No tamponade physiology. Admission 7/10/22-7/12/22, pneumonia, back pain    Medications Prior to admit:  Prior to Admission medications    Medication Sig Start Date End Date Taking? Authorizing Provider   promethazine-codeine (PHENERGAN WITH CODEINE) 6.25-10 MG/5ML syrup Take 5 mLs by mouth 4 times daily as needed for Cough for up to 60 days.  7/8/22 9/6/22  Cole Cordova MD   metoprolol succinate (TOPROL XL) 100 MG extended release tablet Take 1 tablet by mouth daily 7/8/22   Cole Cordova MD   metoprolol succinate (TOPROL XL) 50 MG extended release tablet TAKE ONE TABLET EVERY DAY WITH THE 100MG 7/8/22   Cole Cordova MD   levothyroxine (SYNTHROID) 75 MCG tablet Take 1 tablet by mouth daily 7/8/22   Cloe Cordova MD   escitalopram (LEXAPRO) 10 MG tablet Take 1 tablet by mouth 2 times daily 7/8/22 8/7/22  Cole Cordova MD   famotidine (PEPCID) 20 MG tablet Take 1 tablet by mouth 2 times daily 7/8/22 8/7/22  Cole Cordova MD   ferrous sulfate (IRON 325) 325 (65 Fe) MG tablet Take 1 tablet by mouth 2 times daily 7/8/22   Cole Cordova MD   amLODIPine (NORVASC) 5 MG tablet Take 1 tablet by mouth nightly 7/8/22   Cole Cordova MD   predniSONE (DELTASONE) 5 MG tablet Take 1 tablet by mouth daily 7/8/22   Ruven Essex, MD   cycloSPORINE (RESTASIS) 0.05 % ophthalmic emulsion Place 1 drop into both eyes every 12 hours 7/8/22   Ruven Essex, MD   oxyCODONE-acetaminophen (PERCOCET) 5-325 MG per tablet Take 1 tablet by mouth every 4 hours as needed for Pain (max: 150/month) for up to 30 days.  7/13/22 8/12/22  Ruven Essex, MD   lidocaine (LIDODERM) 5 % Place 1 patch onto the skin daily for 10 days 12 hours on, 12 hours off. 7/8/22 7/18/22  Ruven Essex, MD   desmopressin (DDAVP) 0.1 mg tablet Take 2 tablets by mouth 2 times daily 6/13/22 9/11/22  Ruven Essex, MD   docusate sodium (COLACE) 100 mg capsule Take 1 capsule by mouth 2 times daily 6/10/22   Ruven Essex, MD   albuterol sulfate HFA (PROVENTIL HFA) 108 (90 Base) MCG/ACT inhaler Inhale 2 puffs into the lungs every 6 hours as needed for Wheezing or Shortness of Breath 5/17/22   Ruven Essex, MD   omega-3 acid ethyl esters (LOVAZA) 1 g capsule Take 1 capsule by mouth 2 times daily 4/27/22   Ruven Essex, MD   BRELORENA ELLIPTA 100-25 MCG/INH AEPB inhaler Inhale 1 puff into the lungs daily 3/17/22   Ruven Essex, MD   Omega 3 1000 MG CAPS Take 1 each by mouth daily 3/9/22   Ruven Essex, MD   albuterol (PROVENTIL) (2.5 MG/3ML) 0.083% nebulizer solution Take 3 mLs by nebulization every 6 hours as needed for Wheezing or Shortness of Breath 2/19/22   Eder Petersen DO   naloxone 4 MG/0.1ML LIQD nasal spray 1 spray by Nasal route as needed for Opioid Reversal 12/14/21   Narda Santiago MD   apixaban (ELIQUIS) 5 MG TABS tablet Take 1 tablet by mouth 2 times daily 11/4/21   Bc Adames MD   mupirocin Phylliss Haw) 2 % ointment Apply topically daily 11/4/21   Bc Adames MD   Respiratory Therapy Supplies (NEBULIZER/TUBING/MOUTHPIECE) KIT 1 kit by Does not apply route daily as needed (wheezing) 4/2/21   Bc Adames MD   hydrocortisone (ANUSOL-HC) 2.5 % CREA rectal cream Apply to affected area BID 10/13/20   Bc Adames MD   sodium Warm and dry no statis dermatitis or ulcers   NEURO / PSYCH: Oriented to person, place and time. Speech clear and appropriate. Follows all commands. Pleasant affect     DATA:    ECG / Tele strips: Atrial fibrillation with rapid ventricular response  Diagnostic:      Intake/Output Summary (Last 24 hours) at 7/15/2022 0730  Last data filed at 7/15/2022 0643  Gross per 24 hour   Intake --   Output 800 ml   Net -800 ml       Labs:   CBC:   Recent Labs     07/14/22 2012   WBC 4.4*   HGB 8.9*   HCT 30.6*        BMP:   Recent Labs     07/14/22 2012      K 3.8   CO2 26   BUN 14   CREATININE 1.1*   LABGLOM >60   CALCIUM 9.4     Mag: No results for input(s): MG in the last 72 hours. Phos: No results for input(s): PHOS in the last 72 hours. TFT:   Lab Results   Component Value Date    TSH 0.296 03/01/2021    B6KFSDF 5.9 02/08/2018    T4FREE 1.17 11/12/2019      HgA1c:   Lab Results   Component Value Date    LABA1C 5.3 11/20/2013     No results found for: EAG  proBNP:   Recent Labs     07/14/22 2012   PROBNP 7,009*     PT/INR: No results for input(s): PROTIME, INR in the last 72 hours. APTT:No results for input(s): APTT in the last 72 hours.   CARDIAC ENZYMES:  Recent Labs     07/14/22 2012 07/14/22  2135   TROPHS 43* 37*     FASTING LIPID PANEL:  Lab Results   Component Value Date/Time    CHOL 229 11/04/2021 12:00 PM    HDL 88 11/04/2021 12:00 PM    LDLCALC 127 11/04/2021 12:00 PM    TRIG 70 11/04/2021 12:00 PM     LIVER PROFILE:  Recent Labs     07/14/22 2012   AST 81*   ALT 68*   LABALBU 3.6       Electronically signed by FRIDA Gonzáles CNP on 7/15/2022 at 7:30 AM      Impression and plan by Vijay Becerra

## 2022-07-15 NOTE — PROGRESS NOTES
Left voicemail for patients  regarding home medications Admitted  Surgery - R ankle orif 3/23/17  Perioperative abx  Pain control  DVT ppx  PT consult  Med consult

## 2022-07-15 NOTE — CONSULTS
Palliative Care Department  Palliative Care Initial Consult  Provider: Shivani Andres, FRIDA  ANDREAS  [phone]    Hospital Day: 2  Date of Initial Consult: 7/15/2022  Referring Provider: Dr. Florinda Louise was consulted for assistance with: Code status Discussion, Assist with goals of care, and Family Support    Chief Complaint: Supriya Farley is a 73 y.o. female with chief complaint of SOB    HPI:   Supriya Farley is a 73 y.o. female with significant medical history of CHF, A. fib, COPD, sarcoidosis, chronic pain managed with chronic opioids, with recent hospital admission for management of pneumonia, discharged home on 7/12, who was admitted on 7/14/2022 for worsening shortness of breath. ASSESSMENT/PLAN:     Pertinent Hospital Diagnoses:  Current medical issues leading to Palliative Medicine involvement include   Active Hospital Problems    Diagnosis Date Noted    Acute combined systolic and diastolic CHF, NYHA class 1 ([facility] [I50.41] 07/14/2022     Priority: Medium    Long term (current) use of systemic steroids [Z79.52] 07/10/2022     Priority: Medium    Chronic respiratory failure with hypoxia (HCC) [J96.11] 11/21/2017    Chronic back pain [M54.9, G89.29]     Diabetes insipidus, neurohypophyseal ([facility] [address] [E23.2]     Sarcoidosis [D86.9]          Palliative Care Encounter / Counseling Regarding Goals of Care:  Please see detailed goals of care discussion as below. At this time, Supriya Farley, Does have capacity for medical decision-making. Capacity is time limited and situation/question specific. Outcome of goals of care meeting: live longer, improve or maintain function/quality of life, and continue current management  She is considering placement as [facility] at d/c  Code status: Full Code  Advanced Directives: None  Surrogate/Legal NOK:   Peterson Herron ()    There are no further PM needs at this time. PM will now sign off. If new PM needs arise, please re-consult.   Thank you.      SUBJECTIVE:   Events/Discussions:  Mrs. Deronda Sandhoff was seen at the bedside, no family present. She is alert and oriented, able to voice her needs and concerns well. She complains of her chronic pain, as well as some SOB, but no current chest pain. We discussed her goals and she wishes to continue with her current plan of care and does state she is considering FR[facility]Z placement before returning home, stating she understands that this may be helpful. We tried to discuss resuscitation, but she states she does not wish to speak about this topic, but did accept an informational pamphlet. She appreciated the support provided but denies further palliative care needs at this time.     Past Medical History:   Diagnosis Date    A-fib [facility])     follows with Dr Park Serrato to transfer from chair to wheelchair     with assistance    Abnormal mammogram 2010    Acute on chronic respiratory failure ([address] 05/05/2017    Anemia     Anemia due to chronic illness     Ankle fracture, left 2008    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[address] 03/26/2007    Backache     Benign hypertension     CHF (congestive heart failure) (HCC)     Chronic back pain     Chronic kidney disease     stage 3    Chronic pain disorder     Chronic, continuous use of opioids     Compression fracture of thoracic vertebra (HCC)     T10    COPD (chronic obstructive pulmonary disease) ([address]     Debility     Deformity of ankle and foot, acquired 01/31/2011    Depression     Diabetes insipidus ([address]     Diverticulitis     Dry eyes     Encephalopathy acute 05/05/2017    Gait disturbance     GERD (gastroesophageal reflux disease)     Hemorrhoids 01/13/2012    Hernia     History of blood transfusion approx 05/2017    History of Clostridium difficile     negative culture 12-15-15; resolved    Hyperlipidemia     Hypothyroidism     Ischemic colitis, enteritis, or enterocolitis ([address]     Long term (current) use of systemic steroids 7/10/2022    Long-term current use of steroids     Lumbar disc herniation     Myalgia and myositis, unspecified     Nephrosclerosis     Nonunion of fracture 02/22/2011    Osteoarthritis     severe    PAF (paroxysmal atrial fibrillation) (HCC)     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HCC)     PCWP mean:26.0 PA mean: 24.00; denies any recent issues    Pulmonary hypertension (HCC)     ventricular diastolic function consistent with abnormal relaxation (stage I)    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, f/u w/ PCP    Rectal bleeding     Rhabdomyolysis 05/09/2011    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  fx T10    LUMBAR DISC SURGERY      OTHER SURGICAL HISTORY  11/1/11    removal r leg external fixator    OTHER SURGICAL HISTORY  12/14/2021    Partial Vaginectomy, Endometrial Biopsy    SIGMOIDOSCOPY N/A 3/19/2021    SIGMOIDOSCOPY HEMORRHOID BANDING performed by Dank Manning MD at [address] [place] [address] Marveen Days      application TSF  [birth year]    UPPER GASTROINTESTINAL ENDOSCOPY  1/4/2016    UPPER GASTROINTESTINAL ENDOSCOPY  07/26/2017       Family History   Problem Relation Age of Onset    Diabetes Mother     Arthritis Mother     High Blood Pressure Mother     Arthritis Father     High Blood Pressure Father     Diabetes Father     High Blood Pressure Sister     Alcohol Abuse Sister     Substance Abuse Brother     Substance Abuse Sister     Coronary Art Dis Neg Hx     Breast Cancer Neg Hx     Ovarian Cancer Neg Hx        No Known Allergies    ROS: UNLESS STATED ABOVE PATIENT DENIES:  CONSTITUTIONAL:  fever, chill, rigors, nausea, vomiting, fatigue. HEENT: blurry vision, double vision, hearing problem, tinnitus, hoarseness, dysphagia, odynophagia  RESPIRATORY: cough, shortness of breath, sputum expectoration. CARDIOVASCULAR:  Chest pain/pressure, palpitation, syncope, irregular beats  GASTROINTESTINAL:  abdominal or rectal pain, diarrhea, constipation, . GENITOURINARY:  Burning, frequency, urgency, incontinence, discharge  INTEGUMENTARY: rash, wound, pruritis  HEMATOLOGIC/LYMPHATIC:  Swelling, sores, gum bleeding, easy bruising, pica. MUSCULOSKELETAL:  pain, edema, joint swelling or redness  NEUROLOGICAL:  light headed, dizziness, loss of consciousness, weakness, change in memory, seizures, tremors    OBJECTIVE:   Prognosis: unknown    Physical Exam:  /74   Pulse 64   Temp 97.9 °F (36.6 °C) (Temporal)   Resp 18   Wt 190 lb 9.6 oz (86.5 kg)   LMP  (LMP Unknown)   SpO2 98%   BMI 37.22 kg/m²     Gen:  Alert, chronically ill appearing, in no acute distress  HEENT:  Normocephalic, conjunctiva pink, no drainage, mucosa moist  Neck:  Supple  Lungs:  no increased work of breathing noted  Heart: RRR  Abd:  Soft, non tender, non distended, BS+, umbilical hernia noted  M/S/Ext:  Moving all extremities, no edema, pulses present  Skin:  Warm and dry  Neuro:  PERRL, Alert, oriented x 3; following commands    Objective data reviewed: labs, images, records, medication use, vitals, and chart    Time/Communication:  Greater than 50% of time spent, total 30 minutes in counseling and coordination of care at the bedside regarding goals of care and see above.     FRIDA Fatima CNP  Palliative Medicine    Patient and the plan of care discussed with the other IDT members of Palliative Care Team, and with consultants, Primary Attending, patient, family, and floor nurses, as appropriate and available. Thank you for allowing Palliative Medicine to participate in the care of Latrell Martel. Note: This report was completed using [employer] voiced recognition software. Every effort has been made to ensure accuracy; however, inadvertent computerized transcription errors may be present.

## 2022-07-15 NOTE — PROGRESS NOTES
[facility] Hospitalist   Progress Note    Admitting Date and Time: 7/14/2022  7:14 PM  Admit Dx: Bilateral pleural effusion [J90]  Acute combined systolic and diastolic CHF, NYHA class 1 (HCC) [I50.41]  Atrial fibrillation, unspecified type ([address] [I48.91]  Acute on chronic congestive heart failure, unspecified heart failure type ([address] [I50.9]    Subjective:    Pt up on the side of the bed in no acute distress. Working with physical therapy. His notably short of breath with exertion. Per PT patient was able to take a couple of steps side to side. She denies any new concerns at this time. Per RN: Patient new. On prednisone. ROS: denies fever, chills, cp, n/v, HA unless stated above.      amLODIPine  5 mg Oral Nightly    Arformoterol Tartrate  15 mcg Nebulization BID    budesonide  250 mcg Nebulization BID    desmopressin  200 mcg Oral BID    docusate sodium  100 mg Oral BID    escitalopram  10 mg Oral BID    famotidine  20 mg Oral BID    ipratropium-albuterol  1 ampule Inhalation Q4H WA    levothyroxine  75 mcg Oral Daily    metoprolol succinate  150 mg Oral Daily    predniSONE  5 mg Oral Daily     perflutren lipid microspheres, 1.5 mL, ONCE PRN  oxyCODONE-acetaminophen, 1 tablet, Q6H PRN         Objective:    BP (!) 161/84   Pulse 95   Temp 97.5 °F (36.4 °C) (Oral)   Resp 17   Wt 190 lb 9.6 oz (86.5 kg)   LMP  (LMP Unknown)   SpO2 98%   BMI 37.22 kg/m²   General Appearance: alert and oriented to person, place and time and in no acute distress  Skin: warm and dry  Head: normocephalic and atraumatic  Eyes: pupils equal, round, and reactive to light, extraocular eye movements intact, conjunctivae normal  Neck: neck supple and non tender without mass   Pulmonary/Chest: Crackles to auscultation bilaterally- no wheezes, or rhonchi, normal air movement, no respiratory distress  Cardiovascular: normal rate, normal S1 and S2 and no RGM  Abdomen: soft, non-tender, non-distended, normal bowel sounds. Chronic abdominal wound. Extremities: no cyanosis, no clubbing and 1-2+ BLE edema edema  Neurologic: no cranial nerve deficit and speech normal      Recent Labs     07/14/22 2012      K 3.8      CO2 26   BUN 14   CREATININE 1.1*   GLUCOSE 98   CALCIUM 9.4       Recent Labs     07/14/22 2012   ALKPHOS 150*   PROT 6.4   LABALBU 3.6   BILITOT 0.3   AST 81*   ALT 68*       Recent Labs     07/14/22 2012   WBC 4.4*   RBC 3.43*   HGB 8.9*   HCT 30.6*   MCV 89.2   MCH 25.9*   MCHC 29.1*   RDW 14.5      MPV 11.0            Radiology:   CTA PULMONARY W CONTRAST   Final Result   CHEST:      No central pulmonary embolism; the distal pulmonary arteries are not well   evaluated due to breathing motion artifact. Enlarged pulmonary trunk and   pulmonary arteries are again seen, suggestive of pulmonary arterial   hypertension. Right heart chambers are also enlarged in keeping with the   same. No thoracic aortic aneurysm or dissection. New trace pleural effusions. Mild bilateral atelectasis. Other incidental findings as above. ABDOMEN/PELVIS:      Rectal wall thickening again seen, suggestive of a nonspecific proctitis. Consider further evaluation with colonoscopy. Other incidental findings as above. CT ABDOMEN PELVIS W IV CONTRAST Additional Contrast? None   Final Result   CHEST:      No central pulmonary embolism; the distal pulmonary arteries are not well   evaluated due to breathing motion artifact. Enlarged pulmonary trunk and   pulmonary arteries are again seen, suggestive of pulmonary arterial   hypertension. Right heart chambers are also enlarged in keeping with the   same. No thoracic aortic aneurysm or dissection. New trace pleural effusions. Mild bilateral atelectasis. Other incidental findings as above. ABDOMEN/PELVIS:      Rectal wall thickening again seen, suggestive of a nonspecific proctitis.    Consider further evaluation with colonoscopy. Other incidental findings as above. CT HEAD WO CONTRAST   Final Result   No acute intracranial abnormality. CT CERVICAL SPINE WO CONTRAST   Final Result   No acute abnormality of the cervical spine. Assessment:  Principal Problem:    Acute combined systolic and diastolic CHF, NYHA class 1 (HCC)  Active Problems:    Long term (current) use of systemic steroids    Chronic back pain    Diabetes insipidus, neurohypophyseal (HCC)    Sarcoidosis    Chronic respiratory failure with hypoxia (HCC)  Resolved Problems:    * No resolved hospital problems. *      Plan:  Acute on chronic combined systolic/diastolic CHF-proBNP 9920. Received furosemide 20 mg IV x1 in ED course. 1/17/2020 ECHO LVEF 60%. Mild mitral regurgitation. Mild to moderate tricuspid regurgitation. CTA chest no PE. Pulmonary arterial HTN. Enlarged right heart chambers. No aneurysm/dissection. Trace pleural effusions. ECHO pending. Salt restricted diet. I/os. Weights daily. Cardiology input appreciated. Atrial fibrillation with RVR-EKG A. fib with RVR. Continue Toprol- mg daily. Eliquis discontinued per family request.  Telemetry monitoring. Cardiology input appreciated. COPD Gold stage II/HICKEY-2/2 above? Chronically wears 3-4L NC.  CTA chest no noted PE. Large pulmonary trunk and pulmonary arteries suggestive of pulmonary arterial hypertension. Right heart chambers also enlarged. No thoracic aortic aneurysms or dissection. Trace pleural effusions. Bilateral atelectasis. With recent admission July 10-12 with Levaquin for suspected pneumonia, however was stopped 2/2 patient without fever, leukocytosis, no growth on respiratory cultures and low procalcitonin. Received Solu-Medrol in ED course. Currently requiring 4L NC.  NIV at bedtime. Outpatient sleep study. Continue budesonide/DuoNeb. Respiratory panel pending. Inflammatory markers pending. Monitor off antibiotics. Pulmonology input appreciated. Diabetes insipidus-continue DDAVP. Monitor electrolytes. Hiatal hernia with chronic abdominal wound-CT A/P with rectal wall thickening suggestive of nonspecific proctitis. Consider evaluation with colonoscopy. Was seen by general surgery with last admission. No plans for surgical intervention at that time. Continue local wound care. Follows at wound clinic. Follows with Dr. Kayla Ferreira. Consult wound care  CKD stage III-BUN/Crt 14/1.1. Follows with Dr. Tacos Aivlez. Last seen 8/5/21 avoid nephrotoxic agents. Avoid NSAIDs. Consider consultation with worsening renal function. Anemia of chronic disease-Hx lower GI bleeds on/off Eliquis. H/H 8.9/30. 6. No overt signs of bleeding noted. Continue to follow closely transfuse as needed. 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 Pulmonology following. Generalized weakness/deconditioning-PT/OT. Case management for probable FRITZ placement. Hypothyroidism-continue levothyroxine. GERD-continue PPI  Depression-Continue Lexapro. NOTE: This report was transcribed using voice recognition software. Every effort was made to ensure accuracy; however, inadvertent computerized transcription errors may be present. Electronically signed by Allyssa Barrett - CNP on 7/15/2022 at 8:29 AM

## 2022-07-15 NOTE — TELEPHONE ENCOUNTER
Writer contacted Dr. Matty Calderón to inform of 30 day readmission risk. No Decision on disposition at this time.     Call Back: If you need to call back to inform of disposition you can contact me at [phone]

## 2022-07-15 NOTE — ED PROVIDER NOTES
HPI     Patient is a 73 y.o. female presents with a chief complaint of shortness of breath. Patient states that she has had progressive weakness since her release from the hospital yesterday for the treatment of pneumonia. Patient says that she is unable to go to even move around her house without having to rest.  This has been occurring for 2 days. Patient states that it gets better with rest.  Patient states that it gets worse with exertion  Patient states that it is severe in severity. Patient states it was gradual in onset. Review of Systems   Constitutional: Positive for activity change and fatigue. Negative for appetite change and chills. HENT: Negative for congestion and sore throat. Eyes: Negative for photophobia and visual disturbance. Respiratory: Positive for cough, chest tightness and shortness of breath. Cardiovascular: Positive for leg swelling. Negative for chest pain and palpitations. Gastrointestinal: Positive for abdominal pain and constipation. Negative for abdominal distention, blood in stool and diarrhea. Genitourinary: Positive for hematuria. Negative for dysuria and urgency. Musculoskeletal: Negative for arthralgias and back pain. Skin: Negative for rash and wound. Neurological: Positive for weakness. Negative for dizziness and headaches. Physical Exam  Constitutional:       General: She is not in acute distress. Appearance: She is obese. She is not toxic-appearing. HENT:      Head: Normocephalic and atraumatic. Cardiovascular:      Rate and Rhythm: Regular rhythm. Tachycardia present. Pulses: No decreased pulses. Heart sounds: No friction rub. No gallop. Pulmonary:      Breath sounds: Examination of the right-upper field reveals decreased breath sounds and wheezing. Examination of the left-upper field reveals decreased breath sounds. Examination of the right-middle field reveals decreased breath sounds and wheezing.  Examination of the left-middle field reveals decreased breath sounds. Examination of the right-lower field reveals decreased breath sounds. Examination of the left-lower field reveals decreased breath sounds. Decreased breath sounds and wheezing present. No rhonchi or rales. Chest:      Chest wall: No mass or tenderness. Abdominal:      Palpations: There is no mass. Tenderness: There is abdominal tenderness. There is no guarding or rebound. Musculoskeletal:      Right lower leg: No tenderness. No edema. Left lower leg: No tenderness. No edema. Skin:     General: Skin is warm. Neurological:      General: No focal deficit present. Mental Status: She is alert and oriented to person, place, and time. Motor: No weakness. Psychiatric:         Mood and Affect: Mood is anxious. Procedures   EKG Interpretation  EKG: This EKG is signed and interpreted by me. Rate: 105  Rhythm: Atrial fibrillation  Interpretation: atrial fibrillation (chronic) and normal axis, no acute st elevations, T wave inversions in anterior leads throughout, qt is slightly prolonged, QTc 515  Comparison: changes compared to previous EKG  Interpreted by emergency department physician      Carmina Mcduffie DO  MDM         Patient is a 73 y.o. female presenting with shortness of breath. Patient was admitted. Labs were interpreted by me. Patient will follow up with their primary care provider. Patient is agreeable to this plan. Patient has remained stable throughout their stay in the ED. Patient was seen and evaluated by myself and my attending Jo Crouch DO. Assessment and Plan discussed with attending provider, please see attestation for final plan of care. This note was done using dictation software and there may be some grammatical errors associated with this.     Carmina Mcduffie DO        --------------------------------------------- PAST HISTORY ---------------------------------------------  Past Medical 3/19/2021); and other surgical history (12/14/2021). Social History:  reports that she quit smoking about 25 years ago. Her smoking use included cigarettes. She started smoking about 51 years ago. She has a 18.75 pack-year smoking history. She has never used smokeless tobacco. She reports current drug use. She reports that she does not drink alcohol. Family History: family history includes Alcohol Abuse in her sister; Arthritis in her father and mother; Diabetes in her father and mother; High Blood Pressure in her father, mother, and sister; Substance Abuse in her brother and sister. The patients home medications have been reviewed. Allergies: Patient has no known allergies.     -------------------------------------------------- RESULTS -------------------------------------------------    LABS:  Results for orders placed or performed during the hospital encounter of 07/14/22   COVID-19, Rapid    Specimen: Nasopharyngeal Swab   Result Value Ref Range    SARS-CoV-2, NAAT Not Detected Not Detected   Lactic Acid   Result Value Ref Range    Lactic Acid 1.0 0.5 - 2.2 mmol/L   Comprehensive Metabolic Panel w/ Reflex to MG   Result Value Ref Range    Sodium 141 132 - 146 mmol/L    Potassium reflex Magnesium 3.8 3.5 - 5.0 mmol/L    Chloride 103 98 - 107 mmol/L    CO2 26 22 - 29 mmol/L    Anion Gap 12 7 - 16 mmol/L    Glucose 98 74 - 99 mg/dL    BUN 14 6 - 23 mg/dL    CREATININE 1.1 (H) 0.5 - 1.0 mg/dL    GFR Non-African American >60 >=60 mL/min/1.73    GFR African American >60     Calcium 9.4 8.6 - 10.2 mg/dL    Total Protein 6.4 6.4 - 8.3 g/dL    Albumin 3.6 3.5 - 5.2 g/dL    Total Bilirubin 0.3 0.0 - 1.2 mg/dL    Alkaline Phosphatase 150 (H) 35 - 104 U/L    ALT 68 (H) 0 - 32 U/L    AST 81 (H) 0 - 31 U/L   Lipase   Result Value Ref Range    Lipase 13 13 - 60 U/L   Brain Natriuretic Peptide   Result Value Ref Range    Pro-BNP 7,009 (H) 0 - 125 pg/mL   Troponin   Result Value Ref Range    Troponin, High Sensitivity 43 (H) 0 - 9 ng/L   CBC with Auto Differential   Result Value Ref Range    WBC 4.4 (L) 4.5 - 11.5 E9/L    RBC 3.43 (L) 3.50 - 5.50 E12/L    Hemoglobin 8.9 (L) 11.5 - 15.5 g/dL    Hematocrit 30.6 (L) 34.0 - 48.0 %    MCV 89.2 80.0 - 99.9 fL    MCH 25.9 (L) 26.0 - 35.0 pg    MCHC 29.1 (L) 32.0 - 34.5 %    RDW 14.5 11.5 - 15.0 fL    Platelets 834 756 - 874 E9/L    MPV 11.0 7.0 - 12.0 fL    Neutrophils % 84.6 (H) 43.0 - 80.0 %    Immature Granulocytes % 0.5 0.0 - 5.0 %    Lymphocytes % 6.1 (L) 20.0 - 42.0 %    Monocytes % 4.3 2.0 - 12.0 %    Eosinophils % 4.3 0.0 - 6.0 %    Basophils % 0.2 0.0 - 2.0 %    Neutrophils Absolute 3.74 1.80 - 7.30 E9/L    Immature Granulocytes # 0.02 E9/L    Lymphocytes Absolute 0.27 (L) 1.50 - 4.00 E9/L    Monocytes Absolute 0.19 0.10 - 0.95 E9/L    Eosinophils Absolute 0.19 0.05 - 0.50 E9/L    Basophils Absolute 0.01 0.00 - 0.20 E9/L    Polychromasia 2+     Hypochromia 1+     Poikilocytes 1+     Ovalocytes 1+     Basophilic Stippling 1+    Troponin   Result Value Ref Range    Troponin, High Sensitivity 37 (H) 0 - 9 ng/L   EKG 12 Lead   Result Value Ref Range    Ventricular Rate 105 BPM    Atrial Rate 182 BPM    QRS Duration 80 ms    Q-T Interval 390 ms    QTc Calculation (Bazett) 515 ms    R Axis 37 degrees    T Axis -99 degrees       RADIOLOGY:  CTA PULMONARY W CONTRAST   Final Result   CHEST:      No central pulmonary embolism; the distal pulmonary arteries are not well   evaluated due to breathing motion artifact. Enlarged pulmonary trunk and   pulmonary arteries are again seen, suggestive of pulmonary arterial   hypertension. Right heart chambers are also enlarged in keeping with the   same. No thoracic aortic aneurysm or dissection. New trace pleural effusions. Mild bilateral atelectasis. Other incidental findings as above. ABDOMEN/PELVIS:      Rectal wall thickening again seen, suggestive of a nonspecific proctitis.    Consider further evaluation with colonoscopy. Other incidental findings as above. CT ABDOMEN PELVIS W IV CONTRAST Additional Contrast? None   Final Result   CHEST:      No central pulmonary embolism; the distal pulmonary arteries are not well   evaluated due to breathing motion artifact. Enlarged pulmonary trunk and   pulmonary arteries are again seen, suggestive of pulmonary arterial   hypertension. Right heart chambers are also enlarged in keeping with the   same. No thoracic aortic aneurysm or dissection. New trace pleural effusions. Mild bilateral atelectasis. Other incidental findings as above. ABDOMEN/PELVIS:      Rectal wall thickening again seen, suggestive of a nonspecific proctitis. Consider further evaluation with colonoscopy. Other incidental findings as above. CT HEAD WO CONTRAST   Final Result   No acute intracranial abnormality. CT CERVICAL SPINE WO CONTRAST   Final Result   No acute abnormality of the cervical spine.                 ------------------------- NURSING NOTES AND VITALS REVIEWED ---------------------------  Date / Time Roomed:  7/14/2022  7:14 PM  ED Bed Assignment:  [id number]    The nursing notes within the ED encounter and vital signs as below have been reviewed.      Patient Vitals for the past 24 hrs:   BP Temp Temp src Pulse Resp SpO2   07/15/22 0001 -- (P) 97.5 °F (36.4 °C) (P) Oral (P) 67 (P) 17 --   07/14/22 2256 125/84 98.2 °F (36.8 °C) Oral (!) 107 16 97 %   07/14/22 2057 -- -- -- (!) 103 -- 97 %   07/14/22 2047 -- -- -- (!) 106 -- 97 %   07/14/22 2037 -- -- -- (!) 113 -- 98 %   07/14/22 2030 -- -- -- 100 -- 95 %   07/14/22 2020 -- -- -- (!) 106 -- --   07/14/22 1920 134/83 98.1 °F (36.7 °C) Oral (!) 117 18 92 %       Oxygen Saturation Interpretation: Abnormal - but at baseline    ------------------------------------------ PROGRESS NOTES ------------------------------------------  Re-evaluation(s):   Patients symptoms show no change  Repeat physical

## 2022-07-15 NOTE — CONSULTS
Jonelle Gitelman, M.D.,[facility]  Christina Joseph D.O., F.A.C.O.I., Silvia Sood M.D. Jean Armstrong M.D. Garth Hilliard D.O. Patient:  Shilpa Hays 73 y.o. female MRN: [id number]     Date of Service: 7/15/2022      PULMONARY CONSULTATION    Reason for Consultation: +bronchiectasis, sarcoidosis, on chronic prednisone, ? DMARD management. Needs pulm doc, worsening HICKEY, rule out infection/lyndsey aerosols? AVAPS? ? Referring Physician: Dr. Janelle Benoit MD    Communication with the referring physician will be sent via the electronic medical record. Chief Complaint: shortness of breath    CODE STATUS: FULL     SUBJECTIVE:  HPI:  Shilpa Hays is a 73 y.o.  AA female who we are asked to evaluate for   +bronchiectasis, sarcoidosis, on chronic prednisone, ? DMARD management. Needs pulm doc, worsening HICKEY, rule out infection/lyndsey aerosols? AVAPS? ?.    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 She has a history of myalgias and myositis. She is a well-known patient to our practice. Last seen 2020.   Previously Followed by Dr. Christina Joseph and most recently by Dr. Garth Hilliard for severe pulmonary sarcoidosis diagnosed 1998, biopsy of lung 2001 mainly fibrotic component (not inflammatory) based on CT imaging -therefore prednisone and leflunomide were stopped August 2017, chronic oxygen dependence 4 L at home, pulmonary hypertension , likely due to [facility] group 5, 3, and 2 , COPD with mixed obstructive and restrictive ventilatory defect on PFTs from 2017, dyspnea WHO functional class III. She followed at [facility] pulmonary Dr. Connor Pascual, but wanted to stay at local and has not been seen there since 2017. She also states she has previously undergone sleep study testing as outpatient. She has not ever been given a CPAP machine at home. Prior ABGs over the past 5 years with evidence of hypercapnia CO2 levels as high as 66, compensated pH 7.42. No recent sample obtained. Her home regimen includes Breo daily, albuterol for rescue but does not regularly use this, chronic oxygen 4 L nasal cannula continuously. She has had repeat hospital admissions over the past few weeks most recently July 10-through 12th. She was treated with Levaquin for possible pneumonia which was stopped due to absence of white blood cell count elevation, afebrile, low procalcitonin and no growth on respiratory cultures. She presented back to the ED 7/14/2022 for recurrence of symptoms-shortness of breath and cough. She states her cough is chronic with scant mucus production. No fevers or chills. No nausea or vomiting. No abdominal pain. She appears lethargic on chronic pain medication. She is a good historian therefore information has been obtained from extensive review of her medical record. An attempt was made to call her  \"Finney\" with no answer. Radiology review-chest x-ray with no acute process and no definitive evidence for pneumonia. CTA chest from 7/12/2022-study degraded by motion artifact, no evidence of pulmonary embolism. Trace bilateral pleural effusions with band of atelectasis versus scarring in both lungs. Pulmonary emphysema. No lymphadenopathy. Low lung volumes.   Abdominal views with fatty infiltration of liver. No free air or bowel obstruction noted. 2D echo from 2020 EF 89%, mild RV systolic function with TAPSE 1.2 cm. RVSP 69 mmHg. Severe pulmonary hypertension, not receiving any treatment. No evidence suggesting pulm cardiac involvement of sarcoidosis. No prior R heart cath. Lab testing-sodium 141, potassium 3.8, BUN 14, creatinine 1.1, lactic acid 1.0, procalcitonin 0.08, proBNP 7009, high-sensitivity troponin 43> 37, alk phos 150, ALT 68, AST 81, WBCs 4.4, hemoglobin 8.9, absolute lymphocytes 0.27. Respiratory viral panel-pending. Prior bacterial urine antigen strep pneumo and Legionella-negative. Rapid COVID test not detected. Blood cultures from 7/10/2022 no growth at 24 hours. Currently lying in bed on 4 L nasal cannula. In no acute distress. Denies chest pain. Admits to shortness of breath. States that she would need a new home nebulizer as hers is very old, and has not been able to use it.       Past Medical History:   Diagnosis Date    A-fib [facility])     follows with Dr Idris Salinas to transfer from chair to wheelchair     with assistance    Abnormal mammogram 2010    Acute on chronic respiratory failure ([address] 05/05/2017    Anemia     Anemia due to chronic illness     Ankle fracture, left 2008    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[address] 03/26/2007    Backache     Benign hypertension     CHF (congestive heart failure) (HCC)     Chronic back pain     Chronic kidney disease     stage 3    Chronic pain disorder     Chronic, continuous use of opioids     Compression fracture of thoracic vertebra (HCC)     T10    COPD (chronic obstructive pulmonary disease) ([address]     Debility     Deformity of ankle and foot, acquired 01/31/2011    Depression     Diabetes insipidus ([address]     Diverticulitis     Dry eyes     Encephalopathy acute 05/05/2017    Gait disturbance     GERD (gastroesophageal reflux disease)     Hemorrhoids 01/13/2012    Hernia     History of blood transfusion approx 05/2017 History of Clostridium difficile     negative culture 12-15-15; resolved    Hyperlipidemia     Hypothyroidism     Ischemic colitis, enteritis, or enterocolitis [facility]     Long term (current) use of systemic steroids 7/10/2022    Long-term current use of steroids     Lumbar disc herniation     Myalgia and myositis, unspecified     Nephrosclerosis     Nonunion of fracture 02/22/2011    Osteoarthritis     severe    PAF (paroxysmal atrial fibrillation) ([facility])     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[facility])     PCWP mean:26.0 PA mean: 24.00; denies any recent issues    Pulmonary hypertension (HCC)     ventricular diastolic function consistent with abnormal relaxation (stage I)    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, f/u w/ PCP    Rectal bleeding     Rhabdomyolysis 05/09/2011    Steroid-induced avascular necrosis of shoulder ([facility]     Right    Tibia fracture 07/2010    Ventral hernia without obstruction or gangrene 7/10/2022       Past Surgical History:   Procedure Laterality Date    ABDOMEN SURGERY  2008    ischemic colitis    COLONOSCOPY  2008    healed colitis    COLONOSCOPY  04/11/2014    COLONOSCOPY  11/23/2015    severe colitis    COLONOSCOPY  10/24/2016    COLONOSCOPY  07/26/2017    ECHO COMPL W DOP COLOR FLOW  8/16/2015         ECHO COMPLETE  4/22/2013         FRACTURE SURGERY  2010    Tibial right    HEMORRHOID SURGERY  12/08/2016    KYPHOSIS SURGERY      For comp.  fx T10    LUMBAR DISC SURGERY      OTHER SURGICAL HISTORY  11/1/11    removal r leg external fixator    OTHER SURGICAL HISTORY  12/14/2021    Partial Vaginectomy, Endometrial Biopsy    SIGMOIDOSCOPY N/A 3/19/2021    SIGMOIDOSCOPY HEMORRHOID BANDING performed by Elio Quiros MD at [address] [place] [address] / [facility]      application TSF  0/1/49    UPPER GASTROINTESTINAL ENDOSCOPY  1/4/2016    UPPER GASTROINTESTINAL ENDOSCOPY  07/26/2017       Family History   Problem Relation Age of Onset    Diabetes Mother Arthritis Mother     High Blood Pressure Mother     Arthritis Father     High Blood Pressure Father     Diabetes Father     High Blood Pressure Sister     Alcohol Abuse Sister     Substance Abuse Brother     Substance Abuse Sister     Coronary Art Dis Neg Hx     Breast Cancer Neg Hx     Ovarian Cancer Neg Hx        Social History:   Social History     Socioeconomic History    Marital status:      Spouse name: Not on file    Number of children: Not on file    Years of education: Not on file    Highest education level: Not on file   Occupational History    Occupation: disability -[employer]   Tobacco Use    Smoking status: Former     Packs/day: 0.75     Years: 25.00     Pack years: 18.75     Types: Cigarettes     Start date:      Quit date: 9/10/1996     Years since quittin.8    Smokeless tobacco: Never   Vaping Use    Vaping Use: Never used   Substance and Sexual Activity    Alcohol use: Never     Alcohol/week: 0.0 standard drinks    Drug use: Yes     Comment:  coccaine    Sexual activity: Not Currently   Other Topics Concern    Not on file   Social History Narrative    1 child, 2 step children,  for 20 years. Social Determinants of Health     Financial Resource Strain: Low Risk     Difficulty of Paying Living Expenses: Not hard at all   Food Insecurity: No Food Insecurity    Worried About Running Out of Food in the Last Year: Never true    Ran Out of Food in the Last Year: Never true   Transportation Needs: Not on file   Physical Activity: Inactive    Days of Exercise per Week: 0 days    Minutes of Exercise per Session: 0 min   Stress: Not on file   Social Connections: Not on file   Intimate Partner Violence: Not on file   Housing Stability: Not on file     Smoking history: The patient is a former smoker of cigarettes 0.75 packs/day for 25 years equal 18.75 pack years quit     ETOH:   reports no history of alcohol use. Exposures:  There are no known exposures to TB or asbestos. No history of hot tub exposure. Patient reported prior cocaine use in 1996. No other recreational or IV drugs. No animals pets turtles or birds at home. No travel. 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 Vaccines: Up-to-date    Immunization History   Administered Date(s) Administered    COVID-19, MODERNA BLUE border, Primary or Immunocompromised, (age 12y+), IM, 100 mcg/0.5mL 02/27/2021    Influenza 10/26/2011    Influenza A ([id number]) Vaccine PF IM 11/04/2009    Influenza Vaccine, unspecified formulation 10/26/2011, 12/11/2013, 09/26/2016, 09/24/2018    Influenza Virus Vaccine 10/01/2010, 09/01/2012, 12/11/2013, 11/27/2015, 10/12/2020    Influenza, Intradermal, Preservative free 11/05/2014    Influenza, Intradermal, Quadrivalent, Preservative Free 10/03/2019    Influenza, MDCK Quadv, with preserv IM (Flucelvax 2 yrs and older) 09/25/2017    Influenza, Quadv, IM, (6 mo and older Fluzone, Flulaval, Fluarix and 3 yrs and older Afluria) 09/26/2016    Influenza, Quadv, IM, PF (6 mo and older Fluzone, Flulaval, Fluarix, and 3 yrs and older Afluria) 09/24/2018, 10/28/2019, 10/12/2020, 11/04/2021    MMR 05/13/2019    Pneumococcal Conjugate Vaccine 10/06/2015    Pneumococcal Polysaccharide (Tymbiotyg87) 06/02/2010, 01/03/2017, 05/19/2022    Tdap (Boostrix, Adacel) 06/02/2010    Zoster Recombinant (Shingrix) 10/12/2020, 10/12/2020, 08/03/2021        Home Meds: Medications Prior to Admission: promethazine-codeine (PHENERGAN WITH CODEINE) 6.25-10 MG/5ML syrup, Take 5 mLs by mouth 4 times daily as needed for Cough for up to 60 days.   metoprolol succinate (TOPROL XL) 100 MG extended release tablet, Take 1 tablet by mouth daily  metoprolol succinate (TOPROL XL) 50 MG extended release tablet, TAKE ONE TABLET EVERY DAY WITH THE 100MG  levothyroxine (SYNTHROID) 75 MCG tablet, Take 1 tablet by mouth daily  escitalopram (LEXAPRO) 10 MG tablet, Take 1 tablet by mouth 2 times daily  famotidine (PEPCID) 20 MG tablet, Take 1 tablet by mouth 2 times daily  ferrous sulfate (IRON 325) 325 (65 Fe) MG tablet, Take 1 tablet by mouth 2 times daily  amLODIPine (NORVASC) 5 MG tablet, Take 1 tablet by mouth nightly  predniSONE (DELTASONE) 5 MG tablet, Take 1 tablet by mouth daily  cycloSPORINE (RESTASIS) 0.05 % ophthalmic emulsion, Place 1 drop into both eyes every 12 hours  oxyCODONE-acetaminophen (PERCOCET) 5-325 MG per tablet, Take 1 tablet by mouth every 4 hours as needed for Pain (max: 150/month) for up to 30 days. lidocaine (LIDODERM) 5 %, Place 1 patch onto the skin daily for 10 days 12 hours on, 12 hours off.  desmopressin (DDAVP) 0.1 mg tablet, Take 2 tablets by mouth 2 times daily  docusate sodium (COLACE) 100 mg capsule, Take 1 capsule by mouth 2 times daily  albuterol sulfate HFA (PROVENTIL HFA) 108 (90 Base) MCG/ACT inhaler, Inhale 2 puffs into the lungs every 6 hours as needed for Wheezing or Shortness of Breath  omega-3 acid ethyl esters (LOVAZA) 1 g capsule, Take 1 capsule by mouth 2 times daily  BREO ELLIPTA 100-25 MCG/INH AEPB inhaler, Inhale 1 puff into the lungs daily  Omega 3 1000 MG CAPS, Take 1 each by mouth daily  albuterol (PROVENTIL) (2.5 MG/3ML) 0.083% nebulizer solution, Take 3 mLs by nebulization every 6 hours as needed for Wheezing or Shortness of Breath  naloxone 4 MG/0.1ML LIQD nasal spray, 1 spray by Nasal route as needed for Opioid Reversal  apixaban (ELIQUIS) 5 MG TABS tablet, Take 1 tablet by mouth 2 times daily  mupirocin (BACTROBAN) 2 % ointment, Apply topically daily  Respiratory Therapy Supplies (NEBULIZER/TUBING/MOUTHPIECE) KIT, 1 kit by Does not apply route daily as needed (wheezing)  hydrocortisone (ANUSOL-HC) 2.5 % CREA rectal cream, Apply to affected area BID  sodium chloride (ALTAMIST SPRAY) 0.65 % nasal spray, 1 spray by Nasal route as needed for Congestion  OXYGEN, 4 L/min by Nasal route as needed.  BMS    CURRENT MEDS :  Scheduled Meds:   amLODIPine 5 mg Oral Nightly    Arformoterol Tartrate  15 mcg Nebulization BID    budesonide  250 mcg Nebulization BID    desmopressin  200 mcg Oral BID    docusate sodium  100 mg Oral BID    escitalopram  10 mg Oral BID    famotidine  20 mg Oral BID    ipratropium-albuterol  1 ampule Inhalation Q4H WA    levothyroxine  75 mcg Oral Daily    metoprolol succinate  150 mg Oral Daily    predniSONE  5 mg Oral Daily       Continuous Infusions:      No Known Allergies    REVIEW OF SYSTEMS:  Constitutional: Denies fever, weight loss, night sweats, and fatigue  Skin: Denies pigmentation, dark lesions, and rashes   HEENT: Denies hearing loss, tinnitus, ear drainage, epistaxis, sore throat, and hoarseness. Cardiovascular: Denies palpitations, chest pain, and chest pressure. Respiratory: Shortness of breath, chronic cough  Gastrointestinal: Denies nausea, vomiting, poor appetite, diarrhea, heartburn or reflux  Genitourinary: Denies dysuria, frequency, urgency or hematuria  Musculoskeletal: Chronic pain syndrome, chronic myalgias and fatigue, chronic back pain, general physical debility and deconditioning, wheelchair bound at home  Neurological: Denies dizziness, vertigo, headache, and focal weakness  Psychological: History of depression  Endocrine: Adrenal insufficiency, diabetes insipidus  Hematopoietic/Lymphatic: History of anemia GI bleeding unable to take Eliquis for A. fib    OBJECTIVE:   BP (!) 143/80   Pulse (!) 108   Temp 98.1 °F (36.7 °C) (Oral)   Resp 18   Wt 190 lb 9.6 oz (86.5 kg)   LMP  (LMP Unknown)   SpO2 97%   BMI 37.22 kg/m²   SpO2 Readings from Last 1 Encounters:   07/15/22 97%        I/O:    Intake/Output Summary (Last 24 hours) at 7/15/2022 0911  Last data filed at 7/15/2022 3505  Gross per 24 hour   Intake --   Output 800 ml   Net -800 ml                      Physical Exam:  General: The patient is lying in bed comfortably without any distress.   Breathing is not labored  HEENT: Pupils are equal round and reactive to light, there are no oral lesions and no post-nasal drip   Neck: supple without adenopathy  Cardiovascular: regular rate and rhythm without murmur or gallop  Respiratory: Crackles to auscultation bilaterally without wheezing or crackles. Air entry is symmetric  Abdomen: soft, non-tender, non-distended, normal bowel sounds chronic abdominal wound  Extremities: warm,  no clubbing bilateral lower extremity 1-2+ pitting edema  Skin: no rash or lesion  Neurologic: CN II-XII grossly intact, no focal deficits    Pulmonary Function Testing personally reviewed and interpreted  2017  INTERPRETATION   Pulmonary function test on Gerhardt Hones we obtained in the office today. Patient demonstrates good effort and cooperation. Flow volume loop demonstrates Early peaking followed by prolongation of the expiratory curve consistent with Combined obstructive and restrictive ventilatory defects. FVC is 1.55 liters (70% of predicted). FEV1 is 1.72 liters (54% of predicted). FEV1/FVC ratio is 0.61. Lung volumes show the vital capacity to be 1.51 liters (57% of predicted). Total lung capacity is 4.91 liters (110% of predicted). Residual volume is 3.40 liters (187% of predicted). RV/TLC ratio is 0.69. Diffusing capacity is reduced to 32% of predicted but partailly corrects to 51% for alveolar volume. Findings consistent with moderate obstruction moderate restriction and severe reduction in difusing capacity which partially corrects for alveolar lung volume. Александр Brown D.O.      Imaging personally reviewed:  Chest x-ray 7/14/2022      FINDINGS:   The lungs are without acute focal process. There is no effusion or   pneumothorax. Stable heart size. The osseous structures are without acute   process. Impression   No acute process. No definitive evidence for pneumonia.            CTA chest 7/12/2022  Impression   CHEST:       No central pulmonary embolism; the distal pulmonary arteries are not well evaluated due to breathing motion artifact. Enlarged pulmonary trunk and   pulmonary arteries are again seen, suggestive of pulmonary arterial   hypertension. Right heart chambers are also enlarged in keeping with the   same. No thoracic aortic aneurysm or dissection. New trace pleural effusions. Mild bilateral atelectasis. Other incidental findings as above. Echo:  Conclusions      Summary   Study completed for sarcoidosis and pulmonary hypertension. There is evidence for pulmonary hypertension. There is no evidence for cardiac sarcoidosis on this study. Concentric remodeling. Normal left ventricular systolic function. No wall motion abnormalities or thinning in a non coronary distribution   that would be consistent with cardiac sarcoidosis. Normal diastolic function. Normal left atrial pressure. Mild right ventricular systolic dysfunction. TAPSE is 1.2 cm, TDI s' is 9 cm/s, and the RV free wall is mildly   dysfunctional.   RVSP is 69 mm Hg. Mildly redundant mitral valve leaflets. Mild mitral regurgitation. Mildly redundant tricuspid valve leaflets. Mild to moderate tricuspid valve. Central regurgitant jet. Small inferior pericardial effusion. No tamponade physiology.       Signature       Labs:  Lab Results   Component Value Date/Time    WBC 4.4 07/14/2022 08:12 PM    HGB 8.9 07/14/2022 08:12 PM    HCT 30.6 07/14/2022 08:12 PM    MCV 89.2 07/14/2022 08:12 PM    MCH 25.9 07/14/2022 08:12 PM    MCHC 29.1 07/14/2022 08:12 PM    RDW 14.5 07/14/2022 08:12 PM     07/14/2022 08:12 PM    MPV 11.0 07/14/2022 08:12 PM     Lab Results   Component Value Date/Time     07/14/2022 08:12 PM    K 3.8 07/14/2022 08:12 PM     07/14/2022 08:12 PM    CO2 26 07/14/2022 08:12 PM    BUN 14 07/14/2022 08:12 PM    CREATININE 1.1 07/14/2022 08:12 PM    LABALBU 3.6 07/14/2022 08:12 PM    LABALBU 3.6 05/02/2012 07:40 PM    CALCIUM 9.4 07/14/2022 08:12 PM    GFRAA >60 07/14/2022 08:12 PM    LABGLOM >60 07/14/2022 08:12 PM     Lab Results   Component Value Date/Time    PROTIME 11.0 03/11/2022 11:29 AM    PROTIME 12.7 05/02/2012 07:40 PM    INR 1.0 03/11/2022 11:29 AM     Recent Labs     07/14/22 2012   PROBNP 7,009*     No results for input(s): TROPONINI in the last 72 hours. No results for input(s): PROCAL in the last 72 hours. This SmartLink has not been configured with any valid records. Micro:  No results for input(s): CULTRESP in the last 72 hours. No results for input(s): LABGRAM in the last 72 hours. No results for input(s): LEGUR in the last 72 hours. No results for input(s): STREPNEUMAGU in the last 72 hours. No results for input(s): LP1UAG in the last 72 hours. Assessment:  Chronic respiratory failure with hypoxia-Home O2 dependence 4 L nasal cannula continuous  Severe pulmonary sarcoidosis originally diagnosed 1998, lung biopsy 2001, based on prior CT imaging thought to be fibrotic component therefore leflunomide and prednisone were discontinued August 2017  Severe Pulmonary hypertension likely WHO group 5, 2, 3  Moderate COPD Gold stage II, with restriction on PFTs 2017.  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 I would recommend repeat echo and diuresis. She will need outpatient PFTs, 6MWT, and probable RHC at a [address]. All questions answered.     Follows with Dr. Edith Canada MD

## 2022-07-15 NOTE — ED NOTES
Report to [facility], care of patient relinquished.       Nicolle Nye RN  07/14/22 0074

## 2022-07-15 NOTE — H&P
[facility] History and Physical        Chief Complaint:  hickey  History of Present Illness   The patient is a 73 y.o. female    73year old female with history of diabetes insipidus, pulmonary sarcoidosis, hypertension, hyperlipidemia, chronic kidney disease, depression, paroxysmal A. fib, hypothyroidism, ventral hernia, and chronic opiate use who presents     Known bronchiectasis and sarcodosis of lungs, on home o2  Severe shortness of breath with exertion. progressive weakness since her release from the hospital yesterday for the treatment of \"pneumonia\". unable to go to even move around her house without having to rest  Per  -- ongioing HICKEY and she is \"not feeling well\"- he felt she was [facility] home too soon from [address] [place] [address]  -- on baseline chronic 3-4LNC, no change in cough in past few days  --she claims she has been using her nebulizers at home reguarly  - no cpap use  Per ER- concerned bc afibc RVR -  - - afib is known takes betablocker, but  refuses to give her eliquis - bc bleeds nose, vaginal, Gi etc. (he is upset with cardiology bc of this - seen Dr Chely Patterson 2 years ago), ER requests admission for this and ER wants updated \"echo bc one hasn't been done in years\"-- but afib is chronic  Also ER concerned about \"chf\" bc of high proBNP  (no cardiac mri done, no known cardiac sarcoidosis- uncertain degree of pulm HTN)  CTA doesn't suggest chf, - severe pulm HTN known 2 to sarcoidosi- on dmards and chronic prednisone 5mg QD- no recent inc prednision, finished abx ?for recent pneumnonia  But hasn't seen an outpatient pulmonary doctor- or had her sarcoidosis reassessed wsince dr. Cisco Goel left town. Per last admission DC summary on 7/12:  \"CT showed bronchiectasis with concern for pneumonia and she was started on Levaquin renally dosed. CKD secondary to IV contrast dye - improved with IV fluids.  One dose of Levaquin given IV, repeat CXR negative and repeat procalcitonin negative. Oxygen status back to baseline - uses 2-4 L via NC at home. VSS on 4L.  CT abdomen and pelvis done on 7/9 showed emphysema w RML and LLL bronchiectasis vs infiltrates, pt was admitted w dx of pneumonia and started on Levaquin.  No fever or leukocytosis; procal was technically elevated at 0.14, repeated this AM and now 0.08. Sputum cx without growth and CXR clear. Alesia Leonardoom for DC from my standpoint.     CT also showed some rectosigmoid thickening, possible proctitis; surgery following w no plans for intervention currently. Hiatal hernia with chronic abdominal wound, following with wound care outpatient. Chronic opioid use and steroid use. UDS + opiate and oxycodone. Chart review notes that refill is due July 13th, but patient is asking for more pain medication [facility] and [facility] - possibly presented for pain medication due to running out early. Patient is clinically stable for discharge and baseline oxygenation\"      - hx taken from the patient/  REVIEW OF SYSTEMS:  no fevers, chills, cp, sob, n/v, ha, vision/hearing changes, wt changes, hot/cold flashes, other open skin lesions, diarrhea, constipation, dysuria/hematuria unless noted in HPI. Complete ROS performed with the patient and is otherwise negative.     Past Medical History:      Diagnosis Date    A-fib [facility])     follows with Dr Oscar Thomas to transfer from chair to wheelchair     with assistance    Abnormal mammogram 2010    Acute on chronic respiratory failure ([facility] [address] 05/05/2017    Anemia     Anemia due to chronic illness     Ankle fracture, left 2008    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) 03/26/2007    Backache     Benign hypertension     CHF (congestive heart failure) (HCC)     Chronic back pain     Chronic kidney disease     stage 3    Chronic pain disorder     Chronic, continuous use of opioids     Compression fracture of thoracic vertebra (HCC)     T10    COPD (chronic obstructive pulmonary disease) ([facility] [address]     Debility     Deformity of ankle and foot, acquired 01/31/2011    Depression     Diabetes insipidus ([facility] [address]     Diverticulitis     Dry eyes     Encephalopathy acute 05/05/2017    Gait disturbance     GERD (gastroesophageal reflux disease)     Hemorrhoids 01/13/2012    Hernia     History of blood transfusion approx 05/2017    History of Clostridium difficile     negative culture 12-15-15; resolved    Hyperlipidemia     Hypothyroidism     Ischemic colitis, enteritis, or enterocolitis ([facility] [address]     Long term (current) use of systemic steroids 7/10/2022    Long-term current use of steroids     Lumbar disc herniation     Myalgia and myositis, unspecified     Nephrosclerosis     Nonunion of fracture 02/22/2011    Osteoarthritis     severe    PAF (paroxysmal atrial fibrillation) (HCC)     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HCC)     PCWP mean:26.0 PA mean: 24.00; denies any recent issues    Pulmonary hypertension (HCC)     ventricular diastolic function consistent with abnormal relaxation (stage I)    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, f/u w/ PCP    Rectal bleeding     Rhabdomyolysis 05/09/2011    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  fx T10    LUMBAR DISC SURGERY      OTHER SURGICAL HISTORY  11/1/11    removal r leg external fixator    OTHER SURGICAL HISTORY 12/14/2021    Partial Vaginectomy, Endometrial Biopsy    SIGMOIDOSCOPY N/A 3/19/2021    SIGMOIDOSCOPY HEMORRHOID BANDING performed by Brit Brink MD at [facility] / [facility]      application TSF  1/2/17    UPPER GASTROINTESTINAL ENDOSCOPY  1/4/2016    UPPER GASTROINTESTINAL ENDOSCOPY  07/26/2017       Home Medications:  Prior to Admission medications    Medication Sig Start Date End Date Taking? Authorizing Provider   promethazine-codeine (PHENERGAN WITH CODEINE) 6.25-10 MG/5ML syrup Take 5 mLs by mouth 4 times daily as needed for Cough for up to 60 days. 7/8/22 9/6/22  Wagner Duncan MD   metoprolol succinate (TOPROL XL) 100 MG extended release tablet Take 1 tablet by mouth daily 7/8/22   Wagner Duncan MD   metoprolol succinate (TOPROL XL) 50 MG extended release tablet TAKE ONE TABLET EVERY DAY WITH THE 100MG 7/8/22   Wagner Duncan MD   levothyroxine (SYNTHROID) 75 MCG tablet Take 1 tablet by mouth daily 7/8/22   Wagner Duncan MD   escitalopram (LEXAPRO) 10 MG tablet Take 1 tablet by mouth 2 times daily 7/8/22 8/7/22  Wagner Duncan MD   famotidine (PEPCID) 20 MG tablet Take 1 tablet by mouth 2 times daily 7/8/22 8/7/22  Wagner Duncan MD   ferrous sulfate (IRON 325) 325 (65 Fe) MG tablet Take 1 tablet by mouth 2 times daily 7/8/22   Wagner Duncan MD   amLODIPine (NORVASC) 5 MG tablet Take 1 tablet by mouth nightly 7/8/22   Wagner Duncan MD   predniSONE (DELTASONE) 5 MG tablet Take 1 tablet by mouth daily 7/8/22   Wagner Duncan MD   cycloSPORINE (RESTASIS) 0.05 % ophthalmic emulsion Place 1 drop into both eyes every 12 hours 7/8/22   Wagner Duncan MD   oxyCODONE-acetaminophen (PERCOCET) 5-325 MG per tablet Take 1 tablet by mouth every 4 hours as needed for Pain (max: 150/month) for up to 30 days.  7/13/22 8/12/22  Wagner Duncan MD   lidocaine (LIDODERM) 5 % Place 1 patch onto the skin daily for 10 days 12 hours on, 12 hours off. 7/8/22 7/18/22  Wagner Duncan MD   desmopressin (DDAVP) 0.1 mg tablet Take 2 tablets by mouth 2 times daily 6/13/22 9/11/22  Ladonna Marsh MD   docusate sodium (COLACE) 100 mg capsule Take 1 capsule by mouth 2 times daily 6/10/22   Ladonna Marsh MD   albuterol sulfate HFA (PROVENTIL HFA) 108 (90 Base) MCG/ACT inhaler Inhale 2 puffs into the lungs every 6 hours as needed for Wheezing or Shortness of Breath 5/17/22   Ladonna Marsh MD   omega-3 acid ethyl esters (LOVAZA) 1 g capsule Take 1 capsule by mouth 2 times daily 4/27/22   Ladonna Marsh MD   BRELORENA ELLIPTA 100-25 MCG/INH AEPB inhaler Inhale 1 puff into the lungs daily 3/17/22   Ladonna Marsh MD   Keller 3 1000 MG CAPS Take 1 each by mouth daily 3/9/22   Ladonna Marsh MD   albuterol (PROVENTIL) (2.5 MG/3ML) 0.083% nebulizer solution Take 3 mLs by nebulization every 6 hours as needed for Wheezing or Shortness of Breath 2/19/22   Gray Bosworth,    naloxone 4 MG/0.1ML LIQD nasal spray 1 spray by Nasal route as needed for Opioid Reversal 12/14/21   Alma Woodruff MD   apixaban (ELIQUIS) 5 MG TABS tablet Take 1 tablet by mouth 2 times daily 11/4/21   Russ Deluna MD   mupirocin Valri Liming) 2 % ointment Apply topically daily 11/4/21   Russ Deluna MD   Respiratory Therapy Supplies (NEBULIZER/TUBING/MOUTHPIECE) KIT 1 kit by Does not apply route daily as needed (wheezing) 4/2/21   Russ Deluna MD   hydrocortisone (ANUSOL-HC) 2.5 % CREA rectal cream Apply to affected area BID 10/13/20   Russ Deluna MD   sodium chloride (ALTAMIST SPRAY) 0.65 % nasal spray 1 spray by Nasal route as needed for Congestion 8/6/20   Linda Cardoza DO   mupirocin (BACTROBAN) 2 % ointment Apply topically daily 6/8/20   Russ Deluna MD   OXYGEN 4 L/min by Nasal route as needed. BMS    Historical Provider, MD       Allergies:  Patient has no known allergies. Social History:   TOBACCO:   reports that she quit smoking about 25 years ago. Her smoking use included cigarettes. She started smoking about 51 years ago.  She has a Date/Time     07/14/2022 08:12 PM    K 3.8 07/14/2022 08:12 PM     07/14/2022 08:12 PM    CO2 26 07/14/2022 08:12 PM    BUN 14 07/14/2022 08:12 PM    CREATININE 1.1 07/14/2022 08:12 PM    GLUCOSE 98 07/14/2022 08:12 PM    GLUCOSE 116 05/02/2012 07:40 PM    CALCIUM 9.4 07/14/2022 08:12 PM     Hepatic Function Panel:    Lab Results   Component Value Date/Time    ALKPHOS 150 07/14/2022 08:12 PM    AST 81 07/14/2022 08:12 PM    ALT 68 07/14/2022 08:12 PM    PROT 6.4 07/14/2022 08:12 PM    LABALBU 3.6 07/14/2022 08:12 PM    LABALBU 3.6 05/02/2012 07:40 PM    BILITOT 0.3 07/14/2022 08:12 PM     Magnesium:    Lab Results   Component Value Date/Time    MG 1.8 07/11/2022 05:18 AM       PT/INR:    Lab Results   Component Value Date/Time    PROTIME 11.0 03/11/2022 11:29 AM    PROTIME 12.7 05/02/2012 07:40 PM    INR 1.0 03/11/2022 11:29 AM     U/A:   Lab Results   Component Value Date/Time    NITRITE negative 06/13/2022 03:30 PM    LEUKOCYTESUR negative 06/13/2022 03:30 PM    LEUKOCYTESUR LARGE 04/18/2022 03:19 PM    PHUR 7.0 06/13/2022 03:30 PM    PHUR 6.0 04/18/2022 03:19 PM    [address] PACKED 04/18/2022 03:19 PM    WBCUA 1-3 04/28/2012 08:50 AM    RBCUA NONE 04/18/2022 03:19 PM    RBCUA NONE 11/20/2013 02:10 PM    BACTERIA MANY 04/18/2022 03:19 PM    SPECGRAV 1.020 06/13/2022 03:30 PM    SPECGRAV 1.025 04/18/2022 03:19 PM    BLOODU negative 06/13/2022 03:30 PM    BLOODU LARGE 04/18/2022 03:19 PM    GLUCOSEU negative 06/13/2022 03:30 PM    GLUCOSEU Negative 04/18/2022 03:19 PM    GLUCOSEU NEGATIVE 04/28/2012 08:50 AM     ABG:  No results found for: PHART, JPJ0WDO, PO2ART, Q6ABZFFX, EWV9JVV, BEART  TSH:    Lab Results   Component Value Date/Time    TSH 0.296 03/01/2021 04:39 PM     Cardiac Enzymes:   Lab Results   Component Value Date    CKTOTAL 64 08/23/2018    CKTOTAL 67 08/24/2017    CKTOTAL 47 10/03/2016    CKMB 2.2 08/24/2017    CKMB 1.3 10/03/2016    CKMB 1.6 10/03/2016    TROPONINI <0.01 11/11/2019    TROPONINI <0.01 08/12/2019    TROPONINI <0.01 08/23/2018       Radiology: CT HEAD WO CONTRAST    Result Date: 7/14/2022  EXAMINATION: CT OF THE HEAD WITHOUT CONTRAST  7/14/2022 9:10 pm TECHNIQUE: CT of the head was performed without the administration of intravenous contrast. Automated exposure control, iterative reconstruction, and/or weight based adjustment of the mA/kV was utilized to reduce the radiation dose to as low as reasonably achievable. COMPARISON: None. HISTORY: ORDERING SYSTEM PROVIDED HISTORY: fall TECHNOLOGIST PROVIDED HISTORY: Has a \"code stroke\" or \"stroke alert\" been called? ->No Reason for exam:->fall Decision Support Exception - unselect if not a suspected or confirmed emergency medical condition->Emergency Medical Condition (MA) FINDINGS: BRAIN/VENTRICLES: There is no acute intracranial hemorrhage, mass effect or midline shift. No abnormal extra-axial fluid collection. The gray-white differentiation is maintained without evidence of an acute infarct. There is prominence of the ventricles and sulci due to global parenchymal volume loss. There are nonspecific areas of hypoattenuation within the periventricular and subcortical white matter, which likely represent chronic microvascular ischemic change. ORBITS: The visualized portion of the orbits demonstrate no acute abnormality. SINUSES: The visualized paranasal sinuses and mastoid air cells demonstrate no acute abnormality. SOFT TISSUES/SKULL: No acute abnormality of the visualized skull or soft tissues. No acute intracranial abnormality. CT CERVICAL SPINE WO CONTRAST    Result Date: 7/14/2022  EXAMINATION: CT OF THE CERVICAL SPINE WITHOUT CONTRAST 7/14/2022 9:10 pm TECHNIQUE: CT of the cervical spine was performed without the administration of intravenous contrast. Multiplanar reformatted images are provided for review.  Automated exposure control, iterative reconstruction, and/or weight based adjustment of the mA/kV was utilized to reduce the radiation dose to as low as reasonably achievable. COMPARISON: None. HISTORY: ORDERING SYSTEM PROVIDED HISTORY: fall TECHNOLOGIST PROVIDED HISTORY: Reason for exam:->fall Decision Support Exception - unselect if not a suspected or confirmed emergency medical condition->Emergency Medical Condition (MA) FINDINGS: BONES/ALIGNMENT: There is no acute fracture or traumatic malalignment. DEGENERATIVE CHANGES: There is severe reversal of the lordotic curvature with multilevel discogenic change with severe canal stenosis at C5-6 and C6-7 levels. There is no evidence of acute fracture or subluxation. SOFT TISSUES: There is no prevertebral soft tissue swelling. No acute abnormality of the cervical spine. CT ABDOMEN PELVIS W IV CONTRAST Additional Contrast? None    Result Date: 7/14/2022  EXAMINATION: CTA OF THE CHEST; CT OF THE ABDOMEN AND PELVIS WITH CONTRAST 7/14/2022 6:10 pm TECHNIQUE: CTA of the chest was performed after the administration of intravenous contrast.  Multiplanar reformatted images are provided for review. MIP images are provided for review. Automated exposure control, iterative reconstruction, and/or weight based adjustment of the mA/kV was utilized to reduce the radiation dose to as low as reasonably achievable.; CT of the abdomen and pelvis was performed with the administration of intravenous contrast. Multiplanar reformatted images are provided for review. Automated exposure control, iterative reconstruction, and/or weight based adjustment of the mA/kV was utilized to reduce the radiation dose to as low as reasonably achievable. COMPARISON: CTA chest 03/11/2022, CT abdomen pelvis 07/09/2022 HISTORY: ORDERING SYSTEM PROVIDED HISTORY: r/o pe TECHNOLOGIST PROVIDED HISTORY: Reason for exam:->r/o pe Decision Support Exception - unselect if not a suspected or confirmed emergency medical condition->Emergency Medical Condition (MA) FINDINGS: CHEST: No thoracic aortic aneurysm or dissection.   No acute central pulmonary embolism. The distal pulmonary arteries are not well evaluated due to motion artifact. Enlarged pulmonary trunk measuring 39 mm. Enlarged right heart chambers. No pericardial effusion. Unremarkable thyroid and esophagus. No pathologically enlarged lymph nodes. Trace bilateral pleural effusions. No pneumothorax. Expiratory phase appearance of the trachea. Central airways are patent. Pulmonary emphysema. Bands of atelectasis versus scarring in both lungs. Bibasilar atelectasis. Low lung volumes. Again seen are kyphoplasty changes in T10. Degenerative changes of the spine. Stable height loss T7. Severe deformity of the right shoulder again noted. Severe degenerative changes of the left shoulder joint. ABDOMEN/PELVIS: Fatty infiltration of the liver along falciform ligament. Both kidneys excrete IV contrast.  Otherwise unremarkable organs. No free air, free fluid, or bowel obstruction. Wide-mouth ventral hernia containing nonobstructed small and large bowel again noted. Surgical changes of the right colon; nonvisualized appendix. . Wall thickening of the rectum again seen. No abscess. Colonic diverticulosis. Uterine calcifications, likely small fibroids. Urachal diverticulum of the bladder. No abdominal aortic aneurysm or dissection. Atherosclerotic disease. Advanced degenerative changes of the spine again noted. Grade 2 anterolisthesis of L4 on L5 again noted. Degenerative changes of the hips. Scoliosis. CHEST: No central pulmonary embolism; the distal pulmonary arteries are not well evaluated due to breathing motion artifact. Enlarged pulmonary trunk and pulmonary arteries are again seen, suggestive of pulmonary arterial hypertension. Right heart chambers are also enlarged in keeping with the same. No thoracic aortic aneurysm or dissection. New trace pleural effusions. Mild bilateral atelectasis. Other incidental findings as above.  ABDOMEN/PELVIS: Rectal wall thickening Stable height loss T7. Severe deformity of the right shoulder again noted. Severe degenerative changes of the left shoulder joint. ABDOMEN/PELVIS: Fatty infiltration of the liver along falciform ligament. Both kidneys excrete IV contrast.  Otherwise unremarkable organs. No free air, free fluid, or bowel obstruction. Wide-mouth ventral hernia containing nonobstructed small and large bowel again noted. Surgical changes of the right colon; nonvisualized appendix. . Wall thickening of the rectum again seen. No abscess. Colonic diverticulosis. Uterine calcifications, likely small fibroids. Urachal diverticulum of the bladder. No abdominal aortic aneurysm or dissection. Atherosclerotic disease. Advanced degenerative changes of the spine again noted. Grade 2 anterolisthesis of L4 on L5 again noted. Degenerative changes of the hips. Scoliosis. CHEST: No central pulmonary embolism; the distal pulmonary arteries are not well evaluated due to breathing motion artifact. Enlarged pulmonary trunk and pulmonary arteries are again seen, suggestive of pulmonary arterial hypertension. Right heart chambers are also enlarged in keeping with the same. No thoracic aortic aneurysm or dissection. New trace pleural effusions. Mild bilateral atelectasis. Other incidental findings as above. ABDOMEN/PELVIS: Rectal wall thickening again seen, suggestive of a nonspecific proctitis. Consider further evaluation with colonoscopy. Other incidental findings as above. EKG:  Atrial fibrillation with rapid ventricular response  Anterior infarct (cited on or before 01-MAR-2022)  ST & T wave abnormality, consider inferolateral ischemia  Abnormal ECG  When compared with ECG of 01-MAR-2022 00:46,  Atrial fibrillation has replaced Sinus rhythm  Vent.  rate has increased BY  47 BPM  T wave inversion now evident in Inferior leads  T wave inversion more evident in Anterolateral leads  QT has lengthened   Assessment & Plan   ACTIVE hospital problems being addressed/reassessed for this admission:  Principal Problem:    Acute combined systolic and diastolic CHF, NYHA class 1 (HCC)  Active Problems:    Long term (current) use of systemic steroids    Chronic back pain    Diabetes insipidus, neurohypophyseal (HCC)    Sarcoidosis    Chronic respiratory failure with hypoxia (HCC)  Resolved Problems:    * No resolved hospital problems. *    Code status/DVT prophylaxis and PLAN --see orders   Note extensive time spent coordinating care between ER docs, ER and floor nurses, and transitioning care over to day providers  Plan of care/ clinical impressions/communication specifics detailed below:     73 y.o. female    73year old female with history of diabetes insipidus, pulmonary sarcoidosis, hypertension, hyperlipidemia, chronic kidney disease, depression, paroxysmal A. fib, hypothyroidism, ventral hernia, and chronic opiate use who presents     Known bronchiectasis and sarcodosis of lungs, on home o2  Severe shortness of breath with exertion. progressive weakness since her release from the hospital yesterday for the treatment of \"pneumonia\".     unable to go to even move around her house without having to rest  Per  -- ongioing HICKEY and she is \"not feeling well\"- he felt she was [facility] home too soon from [address] [place] [address]  -- on baseline chronic 3-4LNC, no change in cough in past few days  --she claims she has been using her nebulizers at home reguarly  - no cpap use  Per ER- concerned bc afibc RVR -  - - afib is known takes betablocker, but  refuses to give her eliquis - bc bleeds nose, vaginal, Gi etc. (he is upset with cardiology bc of this - seen Dr Fabby Leary 2 years ago), ER requests admission for this and ER wants updated \"echo bc one hasn't been done in years\"-- but afib is chronic  Also ER concerned about \"chf\" bc of high proBNP  (no cardiac mri done, no known cardiac sarcoidosis- uncertain degree of pulm HTN)  CTA doesn't suggest chf, - severe pulm HTN known 2 to sarcoidosi- on dmards and chronic prednisone 5mg QD- no recent inc prednision, finished abx ?for recent pneumnonia  But hasn't seen an outpatient pulmonary doctor- or had her sarcoidosis reassessed rayshawn Munoz Rickey left town. Per last admission DC summary on 7/12:  \"CT showed bronchiectasis with concern for pneumonia and she was started on Levaquin renally dosed. CKD secondary to IV contrast dye - improved with IV fluids. One dose of Levaquin given IV, repeat CXR negative and repeat procalcitonin negative. Oxygen status back to baseline - uses 2-4 L via NC at home. VSS on 4L.  CT abdomen and pelvis done on 7/9 showed emphysema w RML and LLL bronchiectasis vs infiltrates, pt was admitted w dx of pneumonia and started on Levaquin.  No fever or leukocytosis; procal was technically elevated at 0.14, repeated this AM and now 0.08. Sputum cx without growth and CXR clear. [id number] Tomasa Parkinson for DC from my standpoint.     CT also showed some rectosigmoid thickening, possible proctitis; surgery following w no plans for intervention currently. Hiatal hernia with chronic abdominal wound, following with wound care outpatient. Chronic opioid use and steroid use. UDS + opiate and oxycodone. Chart review notes that refill is due July 13th, but patient is asking for more pain medication [facility] and [facility] - possibly presented for pain medication due to running out early. Patient is clinically stable for discharge and baseline oxygenation\"        Will consult pulmonary doc to establish with- give ONE big dose steroids now  ---+bronchiectasis, sarcoidosis - on chronic pred-- ? DMARD managment needs pulm doc, worsening HICKEY, rule out infection/lyndsey aerosols? AVAP/cpap at night if desats? ?    Echo per ER request,--hickey,  knwon pulm HTN- attention R side heart, knwon sarcoidosi, possibe chf-- mercy to read     cardiology consult- afib w rvr, pulm HTN  - severe hickey, ?sarcoidosis lung - rule out heart, chf  given extra betablocker for afib rate contro  DC elqiuis per  request    Work up  pulm HTN likely warrented    Diabetis insipidus-- on DDAVP  PT/OT/  ? Blaze Beard MD   Night Officer, overnight admitting doctor at [facility] call day time doctor   for questions after 7:30am    Covering for [address] [facility]  If Qs please call [phone]  Electronically signed by Neli Ortiz MD on 7/14/2022 at 10:39 PM

## 2022-07-15 NOTE — CARE COORDINATION
Social Work/Discharge Planning:  Social Work consult noted. Patient is a readmit. Met with patient and completed initial assessment. Explained Social Work role and discussed transition of care/discharge planning. Patient lives with her  in a one story house. PTA she uses a wheeled walker. She has a hospital bed and wears four liters of oxygen with [employer]. She is active with [facility] and will need a resume home care order. She prefers to return home at discharge but understands therapy to evaluate. DME order noted for nebulizer. Called Marnie with [employer] and confirmed patient home oxygen order is four liters continuously. Placed order for nebulizer with [employer]. Will continue to follow and assist with discharge planning.   Electronically signed by JIGAR Oliva on 7/15/2022 at 11:17 AM

## 2022-07-15 NOTE — CARE COORDINATION
Social Work/Discharge Planning:  Met with patient and reviewed therapy score indicating need for rehab. Patient unsure if she wants rehab and states she will think about it. Provided patient with snf choice list to review. Will continue to follow.   Electronically signed by JIGAR Kaplan on 7/15/2022 at 12:52 PM pacu floor

## 2022-07-15 NOTE — PROGRESS NOTES
Occupational Therapy  OCCUPATIONAL THERAPY INITIAL EVALUATION     [facility]  [place], [address]        FGAF:[id number]/[id number]                                                  Patient Name: Saniya Das    MRN: [id number]    : 1955    Room: [address] [place] [address]      Evaluating OT: Fara Petty OTR/L [id number]      Referring Provider: Denise Topete MD    Specific Provider Orders/Date: OT eval and treat 22      Diagnosis: Bilateral pleural effusion [J90]  Acute combined systolic and diastolic CHF, NYHA class 1 ([address] [I50.41]  Atrial fibrillation, unspecified type ([address] [I48.91]  Acute on chronic congestive heart failure, unspecified heart failure type ([address] [I50.9]      Pertinent Medical History: a-fib, CHF, chronic back pain, COPD, DM, OA       Precautions:  Fall Risk, alarm, O2     Assessment of current deficits    [] Functional mobility  [x]ADLs  [x] Strength               [x]Cognition    [x] Functional transfers   [x] IADLs         [x] Safety Awareness   [x]Endurance    [x] Fine Coordination              [x] Balance      [] Vision/perception   [x]Sensation     []Gross Motor Coordination  [] ROM  [] Delirium                   [] Motor Control     OT PLAN OF CARE   OT POC based on physician orders, patient diagnosis and results of clinical assessment    Frequency/Duration 2-4 days/wk for 2 weeks PRN   Specific OT Treatment Interventions to include:   * Instruction/training on adapted ADL techniques and AE recommendations to increase functional independence within precautions       * Training on energy conservation strategies, correct breathing pattern and techniques to improve independence/tolerance for self-care routine  * Functional transfer/mobility training/DME recommendations for increased independence, safety, and fall prevention  * Patient/Family education to increase follow through with safety techniques and functional independence  * Recommendation of environmental modifications for increased safety with functional transfers/mobility and ADLs  * Cognitive retraining/development of therapeutic activities to improve problem solving, judgement, memory, and attention for increased safety/participation in ADL/IADL tasks  * Therapeutic exercise to improve motor endurance, ROM, and functional strength for ADLs/functional transfers  * Therapeutic activities to facilitate/challenge dynamic balance, stand tolerance for increased safety and independence with ADLs  * Therapeutic activities to facilitate gross/fine motor skills for increased independence with ADLs        Recommended Adaptive Equipment: TBD     Home Living: Pt lives with  in 1 story house with ramp entry. Pt reports that her  works a lot and when he is gone she mostly stays in bed and uses a BSC. When  is home she walks more with his assistance. Bathroom setup: tub/shower with shower chair and grab bars, standard height commode; pt reports she showers 1-2 times a week with  assist and the rest of the time she sponge bathes. Equipment owned: wheeled walker, power w/c, BSC, O2 4L    Prior Level of Function: assist from  with ADLs , assist from  with IADLs; functional mobility: wheeled walker    Pain Level: pt reported 7/10 back pain; pt agreeable to therapy  Cognition: A&O: 4/4; 2 step command follow demonstrated. Memory:  fair    Sequencing:  fair    Problem solving:  fair    Judgement/safety:  fair      Functional Assessment:  AM-PAC Daily Activity Raw Score: 15/24   Initial Eval Status  Date: 7/15/22 Treatment Status  Date: STGs = LTGs  Time frame: 10-14 days   Feeding Set up     Grooming Min A, seated  Sup   UB Dressing Min A  For gown management  Sup   LB Dressing Mod A  To don/doff brief.  Cues for technique and assist to manage up/down B hips   SBA-with use of AD as appropriate/needed   Bathing Mod A  SBA -with use of AD as appropriate/needed   Toileting Max A  Pt incontinent of bladder upon standing and Max A to complete hygiene in standing. Some incontinence before returning to bed and additional assist for hygiene. SBA    Bed Mobility  Supine to sit: Min A   Sit to supine: Max A  Assist with UB and BLE back into bed. Cues for technique and safety   SBA   Functional Transfers Mod A with wheeled walker  Sit<>stand from EOB two times  Cues for hand and feet placement and safe technique. Pt with increased time to complete. SBA   Functional Mobility Mod A with wheeled walker  Side steps toward HOB  Pt with cues for sequencing and safe wheeled walker management to maximize safety and participation in ADLs and functional tasks. Pt with decreased balance noted and increased time to complete. SBA -with device as needed to maximize independence with ADLs and functional task completion   Balance Sitting:     Static:  SBA    Dynamic:CGA  Standing: Mod A with wheeled walker  Sup for static/dynamic sitting balance to maximize independence with ADLs and functional task completion    SBA for standing balance to maximize independence with ADLs and functional task completion   Activity Tolerance Fair- with light activity. Pt limited by fatigue and weakness. Pt on 4L O2 and lowest SpO2 was 93%. Fair+ with ADL activity. Pt will demonstrate good understanding of education provided on EC/WS techniques    Visual/  Perceptual Glasses: none at bedside                Additional long-term goal: Pt will increase functional independence to PLOF to allow pt to live in least restrictive environment. Hand Dominance R   AROM (PROM) Strength Additional Info:    RUE  WFL WFL good  and wfl FMC/dexterity noted during ADL tasks       LUE WFL WFL good  and wfl FMC/dexterity noted during ADL tasks       Hearing: WFL   Sensation:  No c/o numbness or tingling   Tone: WFL   Edema: none noted    Comments: Upon arrival patient lying in bed.  At end of session, patient returned to bed with call light and phone within reach, all lines and tubes intact. Overall patient demonstrated decreased independence and safety during completion of ADL/functional transfer/mobility tasks. Pt would benefit from continued skilled OT to increase safety and independence with completion of ADL/IADL tasks for functional independence and quality of life. Treatment: OT treatment provided this date includes:   Instruction/training on safety and adapted techniques for completion of ADLs   Instruction/training on safe functional mobility/transfer techniques   Instruction/training on energy conservation/work simplification for completion of ADLs        Rehab Potential: Good for established goals     Patient / Family Goal: none stated    Patient and/or family were instructed on functional diagnosis, prognosis/goals and OT plan of care. Demonstrated fair understanding. Eval Complexity: Low    Time In: 0929  Time Out: 9261  Total Treatment Time: 10    Min Units   OT Eval Low 97165  x  1   OT Eval Medium 16844      OT Eval High 91986      OT Re-Eval [id number]       Therapeutic Ex 34942       Therapeutic Activities 97335       ADL/Self Care 73770  10  1   Orthotic Management 16874       Manual [id number]     Neuro Re-Ed 45519       Non-Billable Time          Evaluation Time additionally includes thorough review of current medical information, gathering information on past medical history/social history and prior level of function, interpretation of standardized testing/informal observation of tasks, assessment of data and development of plan of care and goals.             Anthony Dorsey, OTR/L, [id number]

## 2022-07-15 NOTE — PROGRESS NOTES
Sleep lab did not fax over the epworth sleep scale form for patient. Attempted to call, but no answer.  Left voicemail

## 2022-07-15 NOTE — FLOWSHEET NOTE
Inpatient Wound Care    Admit Date: 7/14/2022  7:14 PM    Reason for consult:  Abd    Significant history:  Admitted with Pleural Effusion. History of ventral hernia repair, sarcoidosis, A-fib, DM, CKD, HTN. Wound history:  POA    Findings:     07/15/22 1505   Wound 07/10/22 Abdomen Left;Medial   Date First Assessed/Time First Assessed: 07/10/22 2000   Present on Hospital Admission: Yes  Location: Abdomen  Wound Location Orientation: Left;Medial   Wound Etiology Surgical   Dressing Status New dressing applied   Wound Cleansed Cleansed with saline   Dressing/Treatment   (Opticel, foam)   Dressing Change Due 07/16/22   Wound Length (cm) 8 cm   Wound Width (cm) 4 cm   Wound Depth (cm) 0.3 cm   Wound Surface Area (cm^2) 32 cm^2   Change in Wound Size % (l*w) 20   Wound Volume (cm^3) 9.6 cm^3   Wound Healing % 20   Wound Assessment Rhododendron/red;Slough   Drainage Amount Small   Drainage Description Yellow   Odor None   Kaitlyn-wound Assessment   (thin, fragile)       Impression:  Chronic abdominal wound. Heels and sacrum intact. Interventions in place:  Wound cleansed with NSS. Applied Opticel then foam dressing. Plan:Daily dressing change, low air loss pump, SOS precautions.        Krysten De Los Santos RN 7/15/2022 3:07 PM

## 2022-07-16 LAB
ALBUMIN SERPL-MCNC: 3.5 G/DL (ref 3.5–5.2)
ALP BLD-CCNC: 113 U/L (ref 35–104)
ALT SERPL-CCNC: 45 U/L (ref 0–32)
ANION GAP SERPL CALCULATED.3IONS-SCNC: 11 MMOL/L (ref 7–16)
AST SERPL-CCNC: 30 U/L (ref 0–31)
BILIRUB SERPL-MCNC: 0.3 MG/DL (ref 0–1.2)
BUN BLDV-MCNC: 14 MG/DL (ref 6–23)
CALCIUM SERPL-MCNC: 8.7 MG/DL (ref 8.6–10.2)
CHLORIDE BLD-SCNC: 96 MMOL/L (ref 98–107)
CO2: 33 MMOL/L (ref 22–29)
CREAT SERPL-MCNC: 1 MG/DL (ref 0.5–1)
GFR AFRICAN AMERICAN: >60
GFR NON-AFRICAN AMERICAN: >60 ML/MIN/1.73
GLUCOSE BLD-MCNC: 109 MG/DL (ref 74–99)
LV EF: 60 %
LVEF MODALITY: NORMAL
POTASSIUM SERPL-SCNC: 3.2 MMOL/L (ref 3.5–5)
SODIUM BLD-SCNC: 140 MMOL/L (ref 132–146)
TOTAL PROTEIN: 5.9 G/DL (ref 6.4–8.3)
TSH SERPL DL<=0.05 MIU/L-ACNC: 0.04 UIU/ML (ref 0.27–4.2)

## 2022-07-16 PROCEDURE — 84443 ASSAY THYROID STIM HORMONE: CPT

## 2022-07-16 PROCEDURE — 6370000000 HC RX 637 (ALT 250 FOR IP): Performed by: NURSE PRACTITIONER

## 2022-07-16 PROCEDURE — 6370000000 HC RX 637 (ALT 250 FOR IP): Performed by: INTERNAL MEDICINE

## 2022-07-16 PROCEDURE — 94660 CPAP INITIATION&MGMT: CPT

## 2022-07-16 PROCEDURE — 93306 TTE W/DOPPLER COMPLETE: CPT

## 2022-07-16 PROCEDURE — 6360000002 HC RX W HCPCS: Performed by: INTERNAL MEDICINE

## 2022-07-16 PROCEDURE — 2700000000 HC OXYGEN THERAPY PER DAY

## 2022-07-16 PROCEDURE — 36415 COLL VENOUS BLD VENIPUNCTURE: CPT

## 2022-07-16 PROCEDURE — 2060000000 HC ICU INTERMEDIATE R&B

## 2022-07-16 PROCEDURE — 99232 SBSQ HOSP IP/OBS MODERATE 35: CPT | Performed by: INTERNAL MEDICINE

## 2022-07-16 PROCEDURE — 94640 AIRWAY INHALATION TREATMENT: CPT

## 2022-07-16 PROCEDURE — 80053 COMPREHEN METABOLIC PANEL: CPT

## 2022-07-16 PROCEDURE — 6360000002 HC RX W HCPCS: Performed by: NURSE PRACTITIONER

## 2022-07-16 PROCEDURE — 99233 SBSQ HOSP IP/OBS HIGH 50: CPT | Performed by: INTERNAL MEDICINE

## 2022-07-16 RX ORDER — POTASSIUM CHLORIDE 20 MEQ/1
40 TABLET, EXTENDED RELEASE ORAL ONCE
Status: COMPLETED | OUTPATIENT
Start: 2022-07-16 | End: 2022-07-16

## 2022-07-16 RX ORDER — CODEINE PHOSPHATE AND GUAIFENESIN 10; 100 MG/5ML; MG/5ML
5 SOLUTION ORAL EVERY 4 HOURS PRN
Status: DISPENSED | OUTPATIENT
Start: 2022-07-16 | End: 2022-07-18

## 2022-07-16 RX ADMIN — GUAIFENESIN AND CODEINE PHOSPHATE 5 ML: 10; 100 LIQUID ORAL at 16:54

## 2022-07-16 RX ADMIN — FUROSEMIDE 40 MG: 10 INJECTION, SOLUTION INTRAMUSCULAR; INTRAVENOUS at 16:48

## 2022-07-16 RX ADMIN — DESMOPRESSIN ACETATE 200 MCG: 0.1 TABLET ORAL at 09:19

## 2022-07-16 RX ADMIN — OXYCODONE AND ACETAMINOPHEN 1 TABLET: 5; 325 TABLET ORAL at 00:07

## 2022-07-16 RX ADMIN — ESCITALOPRAM OXALATE 10 MG: 10 TABLET ORAL at 20:38

## 2022-07-16 RX ADMIN — DESMOPRESSIN ACETATE 200 MCG: 0.1 TABLET ORAL at 20:39

## 2022-07-16 RX ADMIN — OXYCODONE AND ACETAMINOPHEN 1 TABLET: 5; 325 TABLET ORAL at 09:19

## 2022-07-16 RX ADMIN — PREDNISONE 5 MG: 5 TABLET ORAL at 09:19

## 2022-07-16 RX ADMIN — GUAIFENESIN AND CODEINE PHOSPHATE 5 ML: 10; 100 LIQUID ORAL at 22:29

## 2022-07-16 RX ADMIN — ARFORMOTEROL TARTRATE 15 MCG: 15 SOLUTION RESPIRATORY (INHALATION) at 19:18

## 2022-07-16 RX ADMIN — PETROLATUM: 420 OINTMENT TOPICAL at 20:39

## 2022-07-16 RX ADMIN — DILTIAZEM HYDROCHLORIDE 30 MG: 30 TABLET, FILM COATED ORAL at 23:43

## 2022-07-16 RX ADMIN — LEVOTHYROXINE SODIUM 75 MCG: 75 TABLET ORAL at 06:13

## 2022-07-16 RX ADMIN — DILTIAZEM HYDROCHLORIDE 30 MG: 30 TABLET, FILM COATED ORAL at 12:30

## 2022-07-16 RX ADMIN — BUDESONIDE 250 MCG: 0.25 SUSPENSION RESPIRATORY (INHALATION) at 19:18

## 2022-07-16 RX ADMIN — PETROLATUM: 420 OINTMENT TOPICAL at 09:20

## 2022-07-16 RX ADMIN — DILTIAZEM HYDROCHLORIDE 30 MG: 30 TABLET, FILM COATED ORAL at 16:48

## 2022-07-16 RX ADMIN — OXYCODONE AND ACETAMINOPHEN 1 TABLET: 5; 325 TABLET ORAL at 22:29

## 2022-07-16 RX ADMIN — FAMOTIDINE 20 MG: 20 TABLET ORAL at 20:38

## 2022-07-16 RX ADMIN — FUROSEMIDE 40 MG: 10 INJECTION, SOLUTION INTRAMUSCULAR; INTRAVENOUS at 09:19

## 2022-07-16 RX ADMIN — BUDESONIDE 250 MCG: 0.25 SUSPENSION RESPIRATORY (INHALATION) at 12:00

## 2022-07-16 RX ADMIN — DOCUSATE SODIUM 100 MG: 100 CAPSULE, LIQUID FILLED ORAL at 20:38

## 2022-07-16 RX ADMIN — ESCITALOPRAM OXALATE 10 MG: 10 TABLET ORAL at 09:19

## 2022-07-16 RX ADMIN — IPRATROPIUM BROMIDE AND ALBUTEROL SULFATE 1 AMPULE: .5; 2.5 SOLUTION RESPIRATORY (INHALATION) at 19:18

## 2022-07-16 RX ADMIN — DILTIAZEM HYDROCHLORIDE 30 MG: 30 TABLET, FILM COATED ORAL at 06:13

## 2022-07-16 RX ADMIN — POTASSIUM CHLORIDE 40 MEQ: 1500 TABLET, EXTENDED RELEASE ORAL at 12:30

## 2022-07-16 RX ADMIN — DOCUSATE SODIUM 100 MG: 100 CAPSULE, LIQUID FILLED ORAL at 09:20

## 2022-07-16 RX ADMIN — IPRATROPIUM BROMIDE AND ALBUTEROL SULFATE 1 AMPULE: .5; 2.5 SOLUTION RESPIRATORY (INHALATION) at 12:00

## 2022-07-16 RX ADMIN — IPRATROPIUM BROMIDE AND ALBUTEROL SULFATE 1 AMPULE: .5; 2.5 SOLUTION RESPIRATORY (INHALATION) at 15:29

## 2022-07-16 RX ADMIN — FAMOTIDINE 20 MG: 20 TABLET ORAL at 09:19

## 2022-07-16 RX ADMIN — METOPROLOL SUCCINATE 150 MG: 100 TABLET, FILM COATED, EXTENDED RELEASE ORAL at 20:38

## 2022-07-16 RX ADMIN — ARFORMOTEROL TARTRATE 15 MCG: 15 SOLUTION RESPIRATORY (INHALATION) at 12:00

## 2022-07-16 RX ADMIN — DILTIAZEM HYDROCHLORIDE 30 MG: 30 TABLET, FILM COATED ORAL at 00:07

## 2022-07-16 ASSESSMENT — PAIN DESCRIPTION - DESCRIPTORS: DESCRIPTORS: ACHING;CRAMPING;DISCOMFORT

## 2022-07-16 ASSESSMENT — PAIN - FUNCTIONAL ASSESSMENT: PAIN_FUNCTIONAL_ASSESSMENT: PREVENTS OR INTERFERES SOME ACTIVE ACTIVITIES AND ADLS

## 2022-07-16 ASSESSMENT — PAIN DESCRIPTION - LOCATION: LOCATION: BACK;HIP

## 2022-07-16 ASSESSMENT — PAIN SCALES - GENERAL
PAINLEVEL_OUTOF10: 7
PAINLEVEL_OUTOF10: 9

## 2022-07-16 ASSESSMENT — PAIN DESCRIPTION - ORIENTATION: ORIENTATION: LOWER

## 2022-07-16 ASSESSMENT — PAIN DESCRIPTION - PAIN TYPE: TYPE: CHRONIC PAIN

## 2022-07-16 NOTE — PROGRESS NOTES
Raissa Henry M.D.,[facility]  Brittany Anaya D.O., F.A.C.O.I., Shira Vázquez M.D. Yvette Tomas M.D. Alice Mondragon D.O. Daily Pulmonary Progress Note    Patient:  Roberth Talavera 73 y.o. female MRN: [id number]     Date of Service: 7/16/2022      Synopsis     We are following patient for +bronchiectasis, sarcoidosis, on chronic prednisone, ? DMARD management. Needs pulm doc, worsening HICKEY, rule out infection/lyndsey aerosols? AVAPS?? \"CC\" shortness of breath    Code status: Full code      Subjective      Patient was seen and examined lying in bed in no apparent distress. She remains on 4 L oxygen, which is her baseline at home and says that she wore the CPAP for about 4 hours last night. There is still some slight wheezing in the bases and bronchial wheezing. Echo is still pending. Review of Systems:  Constitutional: Denies fever, weight loss, night sweats, and fatigue  Skin: Denies pigmentation, dark lesions, and rashes   HEENT: Denies hearing loss, tinnitus, ear drainage, epistaxis, sore throat, and hoarseness. Cardiovascular: Denies palpitations, chest pain, and chest pressure. Respiratory: Shortness of breath, cough   gastrointestinal: Denies nausea, vomiting, poor appetite, diarrhea, heartburn or reflux  Genitourinary: Denies dysuria, frequency, urgency or hematuria  Musculoskeletal: Chronic myalgia and fatigue, chronic back pain, general debility and Deconditioning, wheelchair-bound  Neurological: Denies dizziness, vertigo, headache, and focal weakness  Psychological: Denies anxiety, history of depression  Endocrine: Denies heat intolerance and cold intolerance  Hematopoietic/Lymphatic: Denies bleeding problems and blood transfusions.   Unable to take Eliquis for A. fib due to history of anemia and GI bleed    24-hour events:  None    Objective   Vitals: /87   Pulse 91   Temp 97.6 °F (36.4 °C) (Oral)   Resp 20   Wt 182 lb 11.2 oz (82.9 kg)   LMP  (LMP Unknown)   SpO2 99%   BMI 35.68 kg/m²     I/O:    Intake/Output Summary (Last 24 hours) at 7/16/2022 0856  Last data filed at 7/16/2022 9009  Gross per 24 hour   Intake 480 ml   Output 1400 ml   Net -920 ml                  CPAP/EPAP: 8 cmH2O     CURRENT MEDS :  Scheduled Meds:   furosemide  40 mg IntraVENous BID    dilTIAZem  30 mg Oral 4 times per day    white petrolatum   Topical BID    Arformoterol Tartrate  15 mcg Nebulization BID    budesonide  250 mcg Nebulization BID    desmopressin  200 mcg Oral BID    docusate sodium  100 mg Oral BID    escitalopram  10 mg Oral BID    famotidine  20 mg Oral BID    ipratropium-albuterol  1 ampule Inhalation Q4H WA    levothyroxine  75 mcg Oral Daily    metoprolol succinate  150 mg Oral Daily    predniSONE  5 mg Oral Daily       Physical Exam:  General Appearance: appears comfortable in no acute distress. HEENT: Normocephalic atraumatic without obvious abnormality   Neck: Lips, mucosa, and tongue normal.  Supple, symmetrical, trachea midline, no adenopathy;thyroid:  no enlargement/tenderness/nodules or JVD. Lung: Breath sounds bronchial and basilar wheezing. Respirations   unlabored. Symmetrical expansion. Heart: RRR, normal S1, S2. No MRG  Abdomen: Soft, NT, ND. BS present x 4 quadrants. No bruit or organomegaly. Abdominal wound  Extremities: Pedal pulses 2+ symmetric b/l. Extremities normal, no cyanosis, clubbing. 1-2+ pitting edema lower extremities bilaterally  Musculokeletal: No joint swelling, no muscle tenderness. ROM normal in all joints of extremities. Neurologic: Mental status: Alert and Oriented X3 . Pertinent/ New Labs and Imaging Studies     Imaging Personally Reviewed:  Chest x-ray 7/14/2022      FINDINGS:   The lungs are without acute focal process. There is no effusion or   pneumothorax. Stable heart size. The osseous structures are without acute   process. Impression   No acute process. No definitive evidence for pneumonia.              CTA chest 7/12/2022  Impression   CHEST:       No central pulmonary embolism; the distal pulmonary arteries are not well   evaluated due to breathing motion artifact. Enlarged pulmonary trunk and   pulmonary arteries are again seen, suggestive of pulmonary arterial   hypertension. Right heart chambers are also enlarged in keeping with the   same. No thoracic aortic aneurysm or dissection. New trace pleural effusions. Mild bilateral atelectasis. Other incidental findings as above. Echo:  Conclusions      Summary   Study completed for sarcoidosis and pulmonary hypertension. There is evidence for pulmonary hypertension. There is no evidence for cardiac sarcoidosis on this study. Concentric remodeling. Normal left ventricular systolic function. No wall motion abnormalities or thinning in a non coronary distribution   that would be consistent with cardiac sarcoidosis. Normal diastolic function. Normal left atrial pressure. Mild right ventricular systolic dysfunction. TAPSE is 1.2 cm, TDI s' is 9 cm/s, and the RV free wall is mildly   dysfunctional.   RVSP is 69 mm Hg. Mildly redundant mitral valve leaflets. Mild mitral regurgitation. Mildly redundant tricuspid valve leaflets. Mild to moderate tricuspid valve. Central regurgitant jet. Small inferior pericardial effusion. No tamponade physiology.     Labs:  Lab Results   Component Value Date/Time    WBC 4.4 07/14/2022 08:12 PM    HGB 8.9 07/14/2022 08:12 PM    HCT 30.6 07/14/2022 08:12 PM    MCV 89.2 07/14/2022 08:12 PM    MCH 25.9 07/14/2022 08:12 PM    MCHC 29.1 07/14/2022 08:12 PM    RDW 14.5 07/14/2022 08:12 PM     07/14/2022 08:12 PM    MPV 11.0 07/14/2022 08:12 PM     Lab Results   Component Value Date/Time     07/16/2022 03:20 AM    K 3.2 07/16/2022 03:20 AM    K 3.8 07/14/2022 08:12 PM    CL 96 07/16/2022 03:20 AM    CO2 33 07/16/2022 03:20 AM    BUN 14 07/16/2022 03:20 AM CREATININE 1.0 07/16/2022 03:20 AM    LABALBU 3.5 07/16/2022 03:20 AM    LABALBU 3.6 05/02/2012 07:40 PM    CALCIUM 8.7 07/16/2022 03:20 AM    GFRAA >60 07/16/2022 03:20 AM    LABGLOM >60 07/16/2022 03:20 AM     Lab Results   Component Value Date/Time    PROTIME 11.0 03/11/2022 11:29 AM    PROTIME 12.7 05/02/2012 07:40 PM    INR 1.0 03/11/2022 11:29 AM     Recent Labs     07/14/22 2012   PROBNP 7,009*     Recent Labs     07/14/22 2012   PROCAL 0.17*     This SmartLink has not been configured with any valid records. Micro:  No results for input(s): CULTRESP in the last 72 hours. No results for input(s): LABGRAM in the last 72 hours. No results for input(s): LEGUR in the last 72 hours. No results for input(s): STREPNEUMAGU in the last 72 hours. No results for input(s): LP1UAG in the last 72 hours. Assessment:    Chronic respiratory failure with hypoxia-Home O2 dependence 4 L nasal cannula continuous  Severe pulmonary sarcoidosis originally diagnosed 1998, lung biopsy 2001, based on prior CT imaging thought to be fibrotic component therefore leflunomide and prednisone were discontinued August 2017  Severe Pulmonary hypertension likely WHO group 5, 2, 3  Moderate COPD Gold stage II, with restriction on PFTs 2017.  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 Orders placed for new neb machine for home  Follow chest imaging, CTA chest reviewed from 7/12/2022 some scarring likely related to sarcoidosis, small pleural effusions  respiratory viral panel negative, sed rate 20, CRP elevated at 8. Procalcitonin 0.17.  monitor off antibiotics. Received IV Lasix x 1  in ED. Start Lasix 40 IV BID. Cardiology consult placed for management of heart failure. 2D echo ordered-pending  DVT, GI prophylaxis  Pain management per primary service  Will need close follow up after DC: repeat PFTs, 6 minute walk test will not be completed, largely wheelchair-bound, sleep study, and Right heart cath when more euvolemic to further evaluate Pulm HTN.      Electronically signed by FRIDA Alicia CNP on 7/16/2022 at 8:56 AM

## 2022-07-16 NOTE — PROGRESS NOTES
non-tender, non-distended, normal bowel sounds. Chronic abdominal wound. Extremities: no cyanosis, no clubbing and 1-2+ BLE edema edema  Neurologic: no cranial nerve deficit and speech normal      Recent Labs     07/14/22 2012 07/16/22  0320    140   K 3.8 3.2*    96*   CO2 26 33*   BUN 14 14   CREATININE 1.1* 1.0   GLUCOSE 98 109*   CALCIUM 9.4 8.7         Recent Labs     07/14/22 2012 07/16/22  0320   ALKPHOS 150* 113*   PROT 6.4 5.9*   LABALBU 3.6 3.5   BILITOT 0.3 0.3   AST 81* 30   ALT 68* 45*         Recent Labs     07/14/22 2012   WBC 4.4*   RBC 3.43*   HGB 8.9*   HCT 30.6*   MCV 89.2   MCH 25.9*   MCHC 29.1*   RDW 14.5      MPV 11.0              Radiology:   CTA PULMONARY W CONTRAST   Final Result   CHEST:      No central pulmonary embolism; the distal pulmonary arteries are not well   evaluated due to breathing motion artifact. Enlarged pulmonary trunk and   pulmonary arteries are again seen, suggestive of pulmonary arterial   hypertension. Right heart chambers are also enlarged in keeping with the   same. No thoracic aortic aneurysm or dissection. New trace pleural effusions. Mild bilateral atelectasis. Other incidental findings as above. ABDOMEN/PELVIS:      Rectal wall thickening again seen, suggestive of a nonspecific proctitis. Consider further evaluation with colonoscopy. Other incidental findings as above. CT ABDOMEN PELVIS W IV CONTRAST Additional Contrast? None   Final Result   CHEST:      No central pulmonary embolism; the distal pulmonary arteries are not well   evaluated due to breathing motion artifact. Enlarged pulmonary trunk and   pulmonary arteries are again seen, suggestive of pulmonary arterial   hypertension. Right heart chambers are also enlarged in keeping with the   same. No thoracic aortic aneurysm or dissection. New trace pleural effusions. Mild bilateral atelectasis. Other incidental findings as above. ABDOMEN/PELVIS:      Rectal wall thickening again seen, suggestive of a nonspecific proctitis. Consider further evaluation with colonoscopy. Other incidental findings as above. CT HEAD WO CONTRAST   Final Result   No acute intracranial abnormality. CT CERVICAL SPINE WO CONTRAST   Final Result   No acute abnormality of the cervical spine. Assessment:  Principal Problem:    Acute combined systolic and diastolic CHF, NYHA class 1 ([facility])  Active Problems:    Long term (current) use of systemic steroids    Bilateral pleural effusion    Chronic back pain    Diabetes insipidus, neurohypophyseal (HCC)    Sarcoidosis    Chronic respiratory failure with hypoxia ([facility])  Resolved Problems:    * No resolved hospital problems. *      Plan:  Acute on chronic combined systolic/diastolic CHF-proBNP 6572. Received furosemide 20 mg IV x1 in ED course. 1/17/2020 ECHO LVEF 60%. Mild mitral regurgitation. Mild to moderate tricuspid regurgitation. CTA chest no PE. Pulmonary arterial HTN. Enlarged right heart chambers. No aneurysm/dissection. Trace pleural effusions. ECHO pending. Salt restricted diet. I/os. Weights daily. Furosemide 40 mg IV BID Cardiology input appreciated. Atrial fibrillation with RVR-EKG A. fib with RVR. Continue Toprol- mg daily. Eliquis discontinued per family request.  Telemetry monitoring. Cardiology input appreciated. COPD Gold stage II/HICKEY-2/2 above? Chronically wears 3-4L NC.  CTA chest no noted PE. Large pulmonary trunk and pulmonary arteries suggestive of pulmonary arterial hypertension. Right heart chambers also enlarged. No thoracic aortic aneurysms or dissection. Trace pleural effusions. Bilateral atelectasis. With recent admission July 10-12 with Levaquin for suspected pneumonia, however was stopped 2/2 patient without fever, leukocytosis, no growth on respiratory cultures and low procalcitonin. Received Solu-Medrol in ED course. Currently requiring 4L NC.  NIV at bedtime. Outpatient sleep study. Continue budesonide/DuoNeb. Respiratory panel negative  Inflammatory markers pending. Monitor off antibiotics. Pulmonology input appreciated. Diabetes insipidus-continue DDAVP. Monitor electrolytes. Hiatal hernia with chronic abdominal wound-CT A/P with rectal wall thickening suggestive of nonspecific proctitis. Consider evaluation with colonoscopy. Was seen by general surgery with last admission. No plans for surgical intervention at that time. Continue local wound care. Follows at wound clinic. Follows with Dr. Pamela Lockhart. Consult wound care  CKD stage III-BUN/Crt 14/1.0. Follows with Dr. Matt Gonzales. Last seen 8/5/21 avoid nephrotoxic agents. Avoid NSAIDs. Consider consultation with worsening renal function. Anemia of chronic disease-Hx lower GI bleeds on/off Eliquis. H/H 8.9/30. 6. No overt signs of bleeding noted. Continue to follow closely transfuse as needed. 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 Pulmonology following. Generalized weakness/deconditioning-PT/OT. Case management for probable [facility] placement. Hypothyroidism-continue levothyroxine. GERD-continue PPI  Depression-Continue Lexapro. Hypokalemia- K+3.2. replete follow. Disposition- [facility]/Home at [place]? PT/OT for Evaluation. Case Management for possible placement. Remains on Furosemide. Await specialty plan. NOTE: This report was transcribed using voice recognition software. Every effort was made to ensure accuracy; however, inadvertent computerized transcription errors may be present. Electronically signed by Howie Xiao CNP on 7/16/2022 at 8:15 AM

## 2022-07-16 NOTE — PROGRESS NOTES
ml/kcal  Fluid (ml/day):     Nutrition Diagnosis:   Increased nutrient needs related to increase demand for energy/nutrients as evidenced by wounds    Nutrition Interventions:   Food and/or Nutrient Delivery: Continue Current Diet, Start Oral Nutrition Supplement (Continue Diet. Will Start ONS and monitor.)  Nutrition Education/Counseling: No recommendation at this time  Coordination of Nutrition Care: Continue to monitor while inpatient       Goals:     Goals: PO intake 75% or greater, by next RD assessment       Nutrition Monitoring and Evaluation:   Behavioral-Environmental Outcomes: None Identified  Food/Nutrient Intake Outcomes: Food and Nutrient Intake, Supplement Intake  Physical Signs/Symptoms Outcomes: Biochemical Data, Chewing or Swallowing, GI Status, Fluid Status or Edema, Hemodynamic Status, Nutrition Focused Physical Findings, Skin, Weight    Discharge Planning:     Too soon to determine     Alyx Syed RD, LD  Contact: ext [phone]

## 2022-07-16 NOTE — PROGRESS NOTES
[facility] Cardiology Inpatient Progress Note    Patient is a 73 y.o. female of Sonal Ramirez MD seen in hospital follow up. Chief complaint: Afib    HPI: Some SOB. No CP.      Patient Active Problem List   Diagnosis    Chronic back pain    Primary hypothyroidism    Nephrosclerosis    Diabetes insipidus, neurohypophyseal ([address]    Sarcoidosis    Steroid-induced avascular necrosis of shoulder ([address]    Anemia due to chronic illness    Chronic pain syndrome    Bilateral hip pain    Debility    Bright red rectal bleeding    Hypertensive pulmonary vascular disease    Benign essential hypertension    Chronic kidney disease, stage III (moderate) (HCC)    PAF (paroxysmal atrial fibrillation) ([facility] currently in NSR    Mixed hyperlipidemia    Chronic combined systolic and diastolic heart failure (HCC)    Chronic respiratory failure with hypoxia (HCC)    Mixed simple and mucopurulent chronic bronchitis (HCC)    Recurrent major depressive disorder, in partial remission (HCC)    Gait disturbance    Chronic, continuous use of opioids    PMB (postmenopausal bleeding)    Severe dysplasia of vagina    Epistaxis    Ventral hernia without obstruction or gangrene    Long term (current) use of systemic steroids    Confusion    Acute combined systolic and diastolic CHF, NYHA class 1 (HCC)    Bilateral pleural effusion       No Known Allergies    Current Facility-Administered Medications   Medication Dose Route Frequency Provider Last Rate Last Admin    guaiFENesin-codeine (GUAIFENESIN AC) 100-10 MG/5ML liquid 5 mL  5 mL Oral Q4H PRN FRIDA Lynne CNP        perflutren lipid microspheres (DEFINITY) injection 1.65 mg  1.5 mL IntraVENous ONCE PRN Lakeshia Sainz MD        furosemide (LASIX) injection 40 mg  40 mg IntraVENous BID FRIDA Boston CNP   40 mg at 07/16/22 0919    dilTIAZem (CARDIZEM) tablet 30 mg  30 mg Oral 4 times per day FRIDA Gonzáles CNP   30 mg at 07/16/22 1230    white petrolatum ointment Topical BID Arlen Cloud MD   Given at 07/16/22 0920    oxyCODONE-acetaminophen (PERCOCET) 5-325 MG per tablet 1 tablet  1 tablet Oral Q6H PRN Mable Noel MD   1 tablet at 07/16/22 0919    Arformoterol Tartrate (BROVANA) nebulizer solution 15 mcg  15 mcg Nebulization BID Mable Noel MD   15 mcg at 07/16/22 1200    budesonide (PULMICORT) nebulizer suspension 250 mcg  250 mcg Nebulization BID Mable Noel MD   250 mcg at 07/16/22 1200    desmopressin (DDAVP) tablet 200 mcg  200 mcg Oral BID Mable Noel MD   200 mcg at 07/16/22 0919    docusate sodium (COLACE) capsule 100 mg  100 mg Oral BID Mable Noel MD   100 mg at 07/16/22 0920    escitalopram (LEXAPRO) tablet 10 mg  10 mg Oral BID Mable Noel MD   10 mg at 07/16/22 0919    famotidine (PEPCID) tablet 20 mg  20 mg Oral BID Mable Noel MD   20 mg at 07/16/22 0919    ipratropium-albuterol (DUONEB) nebulizer solution 1 ampule  1 ampule Inhalation Q4H [place] Mable Noel MD   1 ampule at 07/16/22 1200    levothyroxine (SYNTHROID) tablet 75 mcg  75 mcg Oral Daily Mable Noel MD   75 mcg at 07/16/22 3376    metoprolol succinate (TOPROL XL) extended release tablet 150 mg  150 mg Oral Daily Mable Noel MD   150 mg at 07/15/22 1953    predniSONE (DELTASONE) tablet 5 mg  5 mg Oral Daily Mable Noel MD   5 mg at 07/16/22 8061       Review of systems:   Heart: as above   Lungs: as above   Eyes: denies changes in vision or discharge. Ears: denies changes in hearing or pain. Nose: denies epistaxis or masses   Throat: denies sore throat or trouble swallowing. Neuro: denies numbness, tingling, tremors. Skin: denies rashes or itching. : denies hematuria, dysuria   GI: denies vomiting, diarrhea   Psych: denies mood changed, anxiety, depression.     Physical Exam   BP (!) 142/92   Pulse 96   Temp 97.8 °F (36.6 °C) (Oral)   Resp 20   Ht 5' (1.524 m)   Wt 182 lb 11.2 oz (82.9 kg)   LMP  (LMP Unknown)   SpO2 100% BMI 35.68 kg/m²   Constitutional: Oriented to person, place, and time. No distress. Well developed. Head: Normocephalic and atraumatic. Neck: Neck supple. No hepatojugular reflux. No JVD present. Carotid bruit is not present. No tracheal deviation present. No thyromegaly present. Cardiovascular: Normal rate, regular rhythm, normal heart sounds. intact distal pulses. No gallop and no friction rub. No murmur heard. Pulmonary: Breath sounds normal. No respiratory distress. No wheezes. No rales. Chest: Effort normal. No tenderness. Abdominal: Soft. Bowel sounds are normal. No distension or mass. No tenderness, rebound or guarding. Musculoskeletal: . No tenderness. No clubbing or cyanosis. Extremitites: Intact distal pulses. No edema  Neurological: Alert and oriented to person, place, and time. Skin: Skin is warm and dry. No rash noted. Not diaphoretic. No erythema. Psychiatric: Normal mood and affect. Behavior is normal.   Lymphadenopathy: No cervical adenopathy. No groin adenopathy.     CBC:   Lab Results   Component Value Date/Time    WBC 4.4 07/14/2022 08:12 PM    RBC 3.43 07/14/2022 08:12 PM    RBC 3.57 12/14/2021 09:57 AM    HGB 8.9 07/14/2022 08:12 PM    HCT 30.6 07/14/2022 08:12 PM    MCV 89.2 07/14/2022 08:12 PM    MCH 25.9 07/14/2022 08:12 PM    MCHC 29.1 07/14/2022 08:12 PM    RDW 14.5 07/14/2022 08:12 PM     07/14/2022 08:12 PM    MPV 11.0 07/14/2022 08:12 PM     BMP:   Lab Results   Component Value Date/Time     07/16/2022 03:20 AM    K 3.2 07/16/2022 03:20 AM    K 3.8 07/14/2022 08:12 PM    CL 96 07/16/2022 03:20 AM    CO2 33 07/16/2022 03:20 AM    BUN 14 07/16/2022 03:20 AM    LABALBU 3.5 07/16/2022 03:20 AM    LABALBU 3.6 05/02/2012 07:40 PM    CREATININE 1.0 07/16/2022 03:20 AM    CALCIUM 8.7 07/16/2022 03:20 AM    GFRAA >60 07/16/2022 03:20 AM    LABGLOM >60 07/16/2022 03:20 AM     Magnesium:    Lab Results   Component Value Date/Time    MG 1.8 07/11/2022 05:18 AM Cardiac Enzymes:   Lab Results   Component Value Date    CKTOTAL 64 08/23/2018    CKTOTAL 67 08/24/2017    CKTOTAL 47 10/03/2016    CKMB 2.2 08/24/2017    CKMB 1.3 10/03/2016    CKMB 1.6 10/03/2016    TROPHS 37 (H) 07/14/2022    TROPHS 43 (H) 07/14/2022    TROPHS 15 (H) 03/11/2022      PT/INR:    Lab Results   Component Value Date/Time    PROTIME 11.0 03/11/2022 11:29 AM    PROTIME 12.7 05/02/2012 07:40 PM    INR 1.0 03/11/2022 11:29 AM     TSH:    Lab Results   Component Value Date/Time    TSH 0.041 07/16/2022 03:20 AM     Rhythm Strip: atrial fibrillation. Echo, 01/16/2017, EF normal.  Stage 2 diastolic dysfunction. Mild LA dilatation. Mild MR and TR. TR velocity 4.56 m/s. Consistent with severe pulmonary hypertension    Echo Summary 6/16/20:   EF 60%   Study completed for sarcoidosis and pulmonary hypertension. There is evidence for pulmonary hypertension. There is no evidence for cardiac sarcoidosis on this study. Concentric remodeling. Normal left ventricular systolic function. No wall motion abnormalities or thinning in a non coronary distribution that would be consistent with cardiac sarcoidosis. Normal diastolic function. Normal left atrial pressure. Mild right ventricular systolic dysfunction. TAPSE is 1.2 cm, TDI s' is 9 cm/s, and the RV free wall is mildly dysfunctional.   RVSP is 69 mm Hg. Mildly redundant mitral valve leaflets. Mild mitral regurgitation. Mildly redundant tricuspid valve leaflets. Mild to moderate tricuspid valve. Central regurgitant jet. Small inferior pericardial effusion. No tamponade physiology.        ASSESSMENT & PLAN:    Patient Active Problem List   Diagnosis    Chronic back pain    Primary hypothyroidism    Nephrosclerosis    Diabetes insipidus, neurohypophyseal ([address]    Sarcoidosis    Steroid-induced avascular necrosis of shoulder ([address]    Anemia due to chronic illness    Chronic pain syndrome    Bilateral hip pain    Debility    Bright red rectal bleeding    Hypertensive pulmonary vascular disease    Benign essential hypertension    Chronic kidney disease, stage III (moderate) (HCC)    PAF (paroxysmal atrial fibrillation) (HCC) currently in NSR    Mixed hyperlipidemia    Chronic combined systolic and diastolic heart failure (HCC)    Chronic respiratory failure with hypoxia (HCC)    Mixed simple and mucopurulent chronic bronchitis (HCC)    Recurrent major depressive disorder, in partial remission (HCC)    Gait disturbance    Chronic, continuous use of opioids    PMB (postmenopausal bleeding)    Severe dysplasia of vagina    Epistaxis    Ventral hernia without obstruction or gangrene    Long term (current) use of systemic steroids    Confusion    Acute combined systolic and diastolic CHF, NYHA class 1 (HCC)    Bilateral pleural effusion     1. Permanent Afib: Rate control. CHADS2 Vasc score 4. Eliquis held due to anemia. 2. Chest Symptoms:     Eventual ischemia evaluation. 3. Acute on chronic HFpEF:    Diurese with IV lasix. 4. Pulmonary sarcoidosis: No evidence of cardiac sarcoidosis based on cardiac MRI done in 2016. On prednisone    5. HTN: Observe. 6. CKD: Follow labs. 7. Hypothyroidism HRT    8. Hx of GI bleed    9. Hx of PE    10. PH: Moderate PH.    11. Hx of diabetes insipidus: On desmopressin     Srikanth Dockery D.O.   Cardiologist  Cardiology, [facility]

## 2022-07-17 LAB
ALBUMIN SERPL-MCNC: 3.3 G/DL (ref 3.5–5.2)
ALP BLD-CCNC: 109 U/L (ref 35–104)
ALT SERPL-CCNC: 37 U/L (ref 0–32)
ANION GAP SERPL CALCULATED.3IONS-SCNC: 11 MMOL/L (ref 7–16)
AST SERPL-CCNC: 23 U/L (ref 0–31)
BILIRUB SERPL-MCNC: 0.3 MG/DL (ref 0–1.2)
BUN BLDV-MCNC: 12 MG/DL (ref 6–23)
CALCIUM SERPL-MCNC: 8.7 MG/DL (ref 8.6–10.2)
CHLORIDE BLD-SCNC: 94 MMOL/L (ref 98–107)
CO2: 38 MMOL/L (ref 22–29)
CREAT SERPL-MCNC: 1 MG/DL (ref 0.5–1)
GFR AFRICAN AMERICAN: >60
GFR NON-AFRICAN AMERICAN: >60 ML/MIN/1.73
GLUCOSE BLD-MCNC: 75 MG/DL (ref 74–99)
POTASSIUM SERPL-SCNC: 3 MMOL/L (ref 3.5–5)
SODIUM BLD-SCNC: 143 MMOL/L (ref 132–146)
TOTAL PROTEIN: 5.8 G/DL (ref 6.4–8.3)

## 2022-07-17 PROCEDURE — 36415 COLL VENOUS BLD VENIPUNCTURE: CPT

## 2022-07-17 PROCEDURE — 94660 CPAP INITIATION&MGMT: CPT

## 2022-07-17 PROCEDURE — 97530 THERAPEUTIC ACTIVITIES: CPT

## 2022-07-17 PROCEDURE — 6370000000 HC RX 637 (ALT 250 FOR IP): Performed by: NURSE PRACTITIONER

## 2022-07-17 PROCEDURE — 2060000000 HC ICU INTERMEDIATE R&B

## 2022-07-17 PROCEDURE — 99232 SBSQ HOSP IP/OBS MODERATE 35: CPT | Performed by: INTERNAL MEDICINE

## 2022-07-17 PROCEDURE — 80053 COMPREHEN METABOLIC PANEL: CPT

## 2022-07-17 PROCEDURE — 94640 AIRWAY INHALATION TREATMENT: CPT

## 2022-07-17 PROCEDURE — 2700000000 HC OXYGEN THERAPY PER DAY

## 2022-07-17 PROCEDURE — 6360000002 HC RX W HCPCS: Performed by: NURSE PRACTITIONER

## 2022-07-17 PROCEDURE — 99233 SBSQ HOSP IP/OBS HIGH 50: CPT | Performed by: INTERNAL MEDICINE

## 2022-07-17 PROCEDURE — 6360000002 HC RX W HCPCS: Performed by: INTERNAL MEDICINE

## 2022-07-17 PROCEDURE — 6370000000 HC RX 637 (ALT 250 FOR IP): Performed by: INTERNAL MEDICINE

## 2022-07-17 RX ORDER — POTASSIUM CHLORIDE 20 MEQ/1
20 TABLET, EXTENDED RELEASE ORAL 2 TIMES DAILY WITH MEALS
Status: DISCONTINUED | OUTPATIENT
Start: 2022-07-17 | End: 2022-07-18 | Stop reason: HOSPADM

## 2022-07-17 RX ADMIN — POTASSIUM CHLORIDE 20 MEQ: 20 TABLET, EXTENDED RELEASE ORAL at 11:58

## 2022-07-17 RX ADMIN — DILTIAZEM HYDROCHLORIDE 30 MG: 30 TABLET, FILM COATED ORAL at 16:54

## 2022-07-17 RX ADMIN — DOCUSATE SODIUM 100 MG: 100 CAPSULE, LIQUID FILLED ORAL at 20:18

## 2022-07-17 RX ADMIN — FUROSEMIDE 40 MG: 10 INJECTION, SOLUTION INTRAMUSCULAR; INTRAVENOUS at 16:54

## 2022-07-17 RX ADMIN — DESMOPRESSIN ACETATE 200 MCG: 0.1 TABLET ORAL at 09:32

## 2022-07-17 RX ADMIN — IPRATROPIUM BROMIDE AND ALBUTEROL SULFATE 1 AMPULE: .5; 2.5 SOLUTION RESPIRATORY (INHALATION) at 08:40

## 2022-07-17 RX ADMIN — PETROLATUM: 420 OINTMENT TOPICAL at 20:18

## 2022-07-17 RX ADMIN — OXYCODONE AND ACETAMINOPHEN 1 TABLET: 5; 325 TABLET ORAL at 20:18

## 2022-07-17 RX ADMIN — DOCUSATE SODIUM 100 MG: 100 CAPSULE, LIQUID FILLED ORAL at 09:31

## 2022-07-17 RX ADMIN — POTASSIUM CHLORIDE 20 MEQ: 20 TABLET, EXTENDED RELEASE ORAL at 16:54

## 2022-07-17 RX ADMIN — IPRATROPIUM BROMIDE AND ALBUTEROL SULFATE 1 AMPULE: .5; 2.5 SOLUTION RESPIRATORY (INHALATION) at 19:18

## 2022-07-17 RX ADMIN — IPRATROPIUM BROMIDE AND ALBUTEROL SULFATE 1 AMPULE: .5; 2.5 SOLUTION RESPIRATORY (INHALATION) at 16:00

## 2022-07-17 RX ADMIN — DESMOPRESSIN ACETATE 200 MCG: 0.1 TABLET ORAL at 20:21

## 2022-07-17 RX ADMIN — ESCITALOPRAM OXALATE 10 MG: 10 TABLET ORAL at 09:32

## 2022-07-17 RX ADMIN — METOPROLOL SUCCINATE 150 MG: 100 TABLET, FILM COATED, EXTENDED RELEASE ORAL at 20:18

## 2022-07-17 RX ADMIN — FUROSEMIDE 40 MG: 10 INJECTION, SOLUTION INTRAMUSCULAR; INTRAVENOUS at 09:32

## 2022-07-17 RX ADMIN — PREDNISONE 5 MG: 5 TABLET ORAL at 09:32

## 2022-07-17 RX ADMIN — DILTIAZEM HYDROCHLORIDE 30 MG: 30 TABLET, FILM COATED ORAL at 23:58

## 2022-07-17 RX ADMIN — BUDESONIDE 250 MCG: 0.25 SUSPENSION RESPIRATORY (INHALATION) at 19:17

## 2022-07-17 RX ADMIN — GUAIFENESIN AND CODEINE PHOSPHATE 5 ML: 10; 100 LIQUID ORAL at 20:18

## 2022-07-17 RX ADMIN — ESCITALOPRAM OXALATE 10 MG: 10 TABLET ORAL at 20:18

## 2022-07-17 RX ADMIN — GUAIFENESIN AND CODEINE PHOSPHATE 5 ML: 10; 100 LIQUID ORAL at 09:36

## 2022-07-17 RX ADMIN — LEVOTHYROXINE SODIUM 75 MCG: 75 TABLET ORAL at 05:55

## 2022-07-17 RX ADMIN — PETROLATUM: 420 OINTMENT TOPICAL at 09:32

## 2022-07-17 RX ADMIN — ARFORMOTEROL TARTRATE 15 MCG: 15 SOLUTION RESPIRATORY (INHALATION) at 08:39

## 2022-07-17 RX ADMIN — BUDESONIDE 250 MCG: 0.25 SUSPENSION RESPIRATORY (INHALATION) at 08:39

## 2022-07-17 RX ADMIN — ARFORMOTEROL TARTRATE 15 MCG: 15 SOLUTION RESPIRATORY (INHALATION) at 19:18

## 2022-07-17 RX ADMIN — DILTIAZEM HYDROCHLORIDE 30 MG: 30 TABLET, FILM COATED ORAL at 05:55

## 2022-07-17 RX ADMIN — FAMOTIDINE 20 MG: 20 TABLET ORAL at 20:18

## 2022-07-17 RX ADMIN — OXYCODONE AND ACETAMINOPHEN 1 TABLET: 5; 325 TABLET ORAL at 09:36

## 2022-07-17 RX ADMIN — FAMOTIDINE 20 MG: 20 TABLET ORAL at 09:32

## 2022-07-17 RX ADMIN — IPRATROPIUM BROMIDE AND ALBUTEROL SULFATE 1 AMPULE: .5; 2.5 SOLUTION RESPIRATORY (INHALATION) at 12:27

## 2022-07-17 RX ADMIN — DILTIAZEM HYDROCHLORIDE 30 MG: 30 TABLET, FILM COATED ORAL at 11:58

## 2022-07-17 ASSESSMENT — PAIN DESCRIPTION - LOCATION: LOCATION: BACK

## 2022-07-17 ASSESSMENT — PAIN SCALES - GENERAL
PAINLEVEL_OUTOF10: 8
PAINLEVEL_OUTOF10: 9

## 2022-07-17 ASSESSMENT — PAIN DESCRIPTION - ORIENTATION: ORIENTATION: LOWER

## 2022-07-17 ASSESSMENT — ENCOUNTER SYMPTOMS: RESPIRATORY NEGATIVE: 1

## 2022-07-17 ASSESSMENT — PAIN DESCRIPTION - DESCRIPTORS: DESCRIPTORS: ACHING;CRAMPING;DISCOMFORT

## 2022-07-17 NOTE — PROGRESS NOTES
Date: 7/17/2022    Time: 3:30 AM    Patient Placed On BIPAP/CPAP/ Non-Invasive Ventilation? No    If no must comment. Facial area red/color change? No           If YES are Blister/Lesion present? No   If yes must notify nursing staff  BIPAP/CPAP skin barrier? Yes    Skin barrier type:mepilexlite       Comments: Pt remains on CPAP at this time, mepilex in place. Machine plugged into red outlet and outside alarm plugged in.         Telma Maldonado RCP    07/17/22 0330   NIV Type   NIV Started/Stopped On   Mode CPAP   Mask Type Full face mask   Mask Size Medium   Settings/Measurements   CPAP/EPAP 8 cmH2O   Resp 20   FiO2  40 %   Vt Exhaled 302 mL   Minute Volume 4.3 Liters   Mask Leak (lpm) 34 lpm   Comfort Level Good   Using Accessory Muscles No

## 2022-07-17 NOTE — PROGRESS NOTES
Progress  Note  Chief Complaint   Patient presents with    Shortness of Breath     recent admission for pneumonia, increasing SOB since discharge, EMS gave 2 albuterol and 125 Solu-medrol, wears 3l NC all the time      Historical Issues:  Current Facility-Administered Medications   Medication Dose Route Frequency Provider Last Rate Last Admin    potassium chloride (KLOR-CON M) extended release tablet 20 mEq  20 mEq Oral BID WC Rachele Zhu MD        guaiFENesin-codeine (GUAIFENESIN AC) 100-10 MG/5ML liquid 5 mL  5 mL Oral Q4H PRN Freddy Hernandez APRN - CNP   5 mL at 07/17/22 0258    perflutren lipid microspheres (DEFINITY) injection 1.65 mg  1.5 mL IntraVENous ONCE PRN Rebecca Shah MD        furosemide (LASIX) injection 40 mg  40 mg IntraVENous BID CezarFRIDA Hopkins - CNP   40 mg at 07/17/22 0932    dilTIAZem (CARDIZEM) tablet 30 mg  30 mg Oral 4 times per day FRIDA Martinez - CNP   30 mg at 07/17/22 0555    white petrolatum ointment   Topical BID [facility] Darek Ortiz MD   Given at 07/17/22 0932    oxyCODONE-acetaminophen (PERCOCET) 5-325 MG per tablet 1 tablet  1 tablet Oral Q6H PRN Rebecca Shah MD   1 tablet at 07/17/22 0936    Arformoterol Tartrate (BROVANA) nebulizer solution 15 mcg  15 mcg Nebulization BID Rebecca Shah MD   15 mcg at 07/17/22 0839    budesonide (PULMICORT) nebulizer suspension 250 mcg  250 mcg Nebulization BID Rebecca Shah MD   250 mcg at 07/17/22 8964    desmopressin (DDAVP) tablet 200 mcg  200 mcg Oral BID Rebecca Shah MD   200 mcg at 07/17/22 0932    docusate sodium (COLACE) capsule 100 mg  100 mg Oral BID Rebecca Shah MD   100 mg at 07/17/22 0931    escitalopram (LEXAPRO) tablet 10 mg  10 mg Oral BID Rebecca Shah MD   10 mg at 07/17/22 0932    famotidine (PEPCID) tablet 20 mg  20 mg Oral BID Rebecca Shah MD   20 mg at 07/17/22 0932    ipratropium-albuterol (DUONEB) nebulizer solution 1 ampule  1 ampule Inhalation Q4H Cricket York MD   1 ampule at 07/17/22 0840    levothyroxine (SYNTHROID) tablet 75 mcg  75 mcg Oral Daily Ellen Stahl MD   75 mcg at 07/17/22 0555    metoprolol succinate (TOPROL XL) extended release tablet 150 mg  150 mg Oral Daily Ellen Stahl MD   150 mg at 07/16/22 2038    predniSONE (DELTASONE) tablet 5 mg  5 mg Oral Daily Ellen Stahl MD   5 mg at 07/17/22 0932     Recent Complaints:  Review of Systems   Respiratory: Negative. Cardiovascular: Negative. Musculoskeletal:         Does not ambulate significantly at home. She pivots to a potty chair and ambulates to get a shower. She lives with her . Vitals:    07/17/22 0841   BP:    Pulse: 87   Resp: 16   Temp:    SpO2: 98%     Physical Exam  Constitutional:       Comments: Weak   Cardiovascular:      Rate and Rhythm: Normal rate. Pulses: Normal pulses. Pulmonary:      Effort: Pulmonary effort is normal.      Breath sounds: Normal breath sounds. Musculoskeletal:      Right lower leg: No edema. Left lower leg: No edema. Neurological:      General: No focal deficit present. Mental Status: She is alert and oriented to person, place, and time. Recent Labs     07/14/22 2012 07/16/22  0320 07/17/22  0340    140 143   K 3.8 3.2* 3.0*    96* 94*   CO2 26 33* 38*   BUN 14 14 12   CREATININE 1.1* 1.0 1.0   GLUCOSE 98 109* 75   CALCIUM 9.4 8.7 8.7     Recent Labs     07/14/22 2012   WBC 4.4*   RBC 3.43*   HGB 8.9*   HCT 30.6*   MCV 89.2   MCH 25.9*   MCHC 29.1*   RDW 14.5      MPV 11.0           Principal Problem:    Acute combined systolic and diastolic CHF, NYHA class 1 (HCC)  Active Problems:    Long term (current) use of systemic steroids    Bilateral pleural effusion    Chronic back pain    Diabetes insipidus, neurohypophyseal (HCC)    Sarcoidosis    Chronic respiratory failure with hypoxia (HCC)  Resolved Problems:    * No resolved hospital problems.  *      Plan:  Potassium replacement  Increase activity  Discharge plan for next 24 hours    Electronically signed by Sreekanth Berger MD on 7/17/2022 at 11:23 AM

## 2022-07-17 NOTE — PROGRESS NOTES
Physical Therapy    Treatment Note    Name: Quentin Joyce  : 1955  MRN: [id number]      Date of Service: 2022    Evaluating PT: Lali Maya, PT, DPT [id number]      Room #:  0682/4328-P  Diagnosis:  Bilateral pleural effusion [J90]  Acute combined systolic and diastolic CHF, NYHA class 1 (HCC) [I50.41]  Atrial fibrillation, unspecified type ([facility] [address] [I48.91]  Acute on chronic congestive heart failure, unspecified heart failure type ([facility] [I50.9]  PMHx/PSHx:  GERD, Depression, COPD, Anemia, HLD, PAF, CHF, AFib, Diabetes Insipidous, Nephrosclerosis, CKD, Pulmonary Sarcoidosis  Precautions:  O2, Fall Risk, Purewick, Alarm      SUBJECTIVE:    Pt lives with  in a single story home with ramp to enter. Bed and bath all on main level. Pt ambulated with [employer] inside the home and has a motor scooter for outside the house prior to admission. Pt states she spends most of the days in bed while her  is away at work. OBJECTIVE:   Initial Evaluation  Date: 7/15/22 Treatment Date: 2022 Short Term/ Long Term   Goals   AM-PAC 6 Clicks 90/68 61/10    Was pt agreeable to Eval/treatment? Yes yes    Does pt have pain? 7/10 back pain No complaints    Bed Mobility  Rolling: NT  Supine to sit: Min A  Sit to supine: Max A  Scooting: Min A NT Rolling: Independent   Supine to sit: Independent   Sit to supine: Independent   Scooting: Independent    Transfers Sit to stand: Mod A  Stand to sit: Mod A  Stand pivot: NT Sit <> stand: Mod A Sit to stand: SBA  Stand to sit: SBA  Stand pivot: SBA with [employer]   Ambulation   NT 8 feet x 2 using [employer] for support Min A for balance >25 feet with Min A with [employer]   Stair negotiation: ascended and descended NT  TBD   ROM BUE: See OT note  BLE: WFL     Strength BUE: See OT note  BLE: Grossly 3/5 not formally assessed     Balance Sitting EOB: SBA  Dynamic Standing:  Mod A with [employer]  Sitting EOB: Independent   Dynamic Standing: CGA with [employer]     Pt is alert and able to follow instruction  Balance: poor dynamic using [employer] for support    Pt performed therapeutic exercise of the following: NT    Patient education/treatment  Pt was educated on UE usage promoting transfer safety, gait mechanics promoting upright posture    Patient response to education:   Pt verbalized understanding Pt demonstrated skill Pt requires further education in this area   yes With prompt for transfers yes     ASSESSMENT:   Comments: Nurse ok with rx. Pt found in a chair. Gait performed, андрей very slow, inconsistent and laboring, noted shuffle gait and poor posture throughout. Pt unsteady, required constant hands on assist for balance and safety, close chair follow maintained as well. Pt was left in a bedside chair as found with call light in reach, waffle cushion in place, B LEs elevated for comfort    Chair/bed alarm: chair alarm active    Time in 1056   Time out 1113   Total Treatment Time 17 minutes   CPT codes:     Therapeutic activities 43456 17 minutes   Therapeutic exercises 42115 0 minutes       Pt is making good progress toward established Physical Therapy goals. Continue with physical therapy current plan of care.     Margarita Recinos PTA   License Number: [id number]

## 2022-07-17 NOTE — PROGRESS NOTES
Destiny Tillman M.D.,[facility]  Kate Cordova D.O., F.A.C.O.I., [place] Meeks M.D. Travis Castañeda M.D. Brigid Weinstein D.O. Daily Pulmonary Progress Note    Patient:  Errol Miller 73 y.o. female MRN: [id number]     Date of Service: 7/17/2022      Synopsis     We are following patient for +bronchiectasis, sarcoidosis, on chronic prednisone, ? DMARD management. Needs pulm doc, worsening HICKEY, rule out infection/lyndsey aerosols? AVAPS?? \"CC\" shortness of breath    Code status: Full code      Subjective      Patient was seen and examined lying in bed in no apparent distress. She was on 3.5L oxygen with a saturation of 97%, decreased to 1L and maintained a saturation of 98%. Target saturation for her is in the mid 90s. She did tolerate CPAP therapy for 5 hours overnight. Lung sounds are very diminished. Review of Systems:  Constitutional: Denies fever, weight loss, night sweats, and fatigue  Skin: Denies pigmentation, dark lesions, and rashes   HEENT: Denies hearing loss, tinnitus, ear drainage, epistaxis, sore throat, and hoarseness. Cardiovascular: Denies palpitations, chest pain, and chest pressure. Respiratory: Shortness of breath, cough   gastrointestinal: Denies nausea, vomiting, poor appetite, diarrhea, heartburn or reflux  Genitourinary: Denies dysuria, frequency, urgency or hematuria  Musculoskeletal: Chronic myalgia and fatigue, chronic back pain, general debility and Deconditioning, wheelchair-bound  Neurological: Denies dizziness, vertigo, headache, and focal weakness  Psychological: Denies anxiety, history of depression  Endocrine: Denies heat intolerance and cold intolerance  Hematopoietic/Lymphatic: Denies bleeding problems and blood transfusions.   Unable to take Eliquis for A. fib due to history of anemia and GI bleed    24-hour events:  None    Objective   Vitals: BP (!) 121/90   Pulse 91   Temp 97.1 °F (36.2 °C) (Temporal)   Resp 18   Ht 5' (1.524 m)   Wt 193 lb (87.5 CTA chest 7/12/2022  Impression   CHEST:       No central pulmonary embolism; the distal pulmonary arteries are not well   evaluated due to breathing motion artifact. Enlarged pulmonary trunk and   pulmonary arteries are again seen, suggestive of pulmonary arterial   hypertension. Right heart chambers are also enlarged in keeping with the   same. No thoracic aortic aneurysm or dissection. New trace pleural effusions. Mild bilateral atelectasis. Other incidental findings as above. Echo:  Conclusions      Summary   Study completed for sarcoidosis and pulmonary hypertension. There is evidence for pulmonary hypertension. There is no evidence for cardiac sarcoidosis on this study. Concentric remodeling. Normal left ventricular systolic function. No wall motion abnormalities or thinning in a non coronary distribution   that would be consistent with cardiac sarcoidosis. Normal diastolic function. Normal left atrial pressure. Mild right ventricular systolic dysfunction. TAPSE is 1.2 cm, TDI s' is 9 cm/s, and the RV free wall is mildly   dysfunctional.   RVSP is 69 mm Hg. Mildly redundant mitral valve leaflets. Mild mitral regurgitation. Mildly redundant tricuspid valve leaflets. Mild to moderate tricuspid valve. Central regurgitant jet. Small inferior pericardial effusion. No tamponade physiology.     Labs:  Lab Results   Component Value Date/Time    WBC 4.4 07/14/2022 08:12 PM    HGB 8.9 07/14/2022 08:12 PM    HCT 30.6 07/14/2022 08:12 PM    MCV 89.2 07/14/2022 08:12 PM    MCH 25.9 07/14/2022 08:12 PM    MCHC 29.1 07/14/2022 08:12 PM    RDW 14.5 07/14/2022 08:12 PM     07/14/2022 08:12 PM    MPV 11.0 07/14/2022 08:12 PM     Lab Results   Component Value Date/Time     07/17/2022 03:40 AM    K 3.0 07/17/2022 03:40 AM    K 3.8 07/14/2022 08:12 PM    CL 94 07/17/2022 03:40 AM    CO2 38 07/17/2022 03:40 AM    BUN 12 07/17/2022 03:40 AM CREATININE 1.0 07/17/2022 03:40 AM    LABALBU 3.3 07/17/2022 03:40 AM    LABALBU 3.6 05/02/2012 07:40 PM    CALCIUM 8.7 07/17/2022 03:40 AM    GFRAA >60 07/17/2022 03:40 AM    LABGLOM >60 07/17/2022 03:40 AM     Lab Results   Component Value Date/Time    PROTIME 11.0 03/11/2022 11:29 AM    PROTIME 12.7 05/02/2012 07:40 PM    INR 1.0 03/11/2022 11:29 AM     Recent Labs     07/14/22 2012   PROBNP 7,009*       Recent Labs     07/14/22 2012   PROCAL 0.17*       This SmartLink has not been configured with any valid records. Micro:  No results for input(s): CULTRESP in the last 72 hours. No results for input(s): LABGRAM in the last 72 hours. No results for input(s): LEGUR in the last 72 hours. No results for input(s): STREPNEUMAGU in the last 72 hours. No results for input(s): LP1UAG in the last 72 hours. Assessment:    Chronic respiratory failure with hypoxia-Home O2 dependence 4 L nasal cannula continuous  Severe pulmonary sarcoidosis originally diagnosed 1998, lung biopsy 2001, based on prior CT imaging thought to be fibrotic component therefore leflunomide and prednisone were discontinued August 2017  Severe Pulmonary hypertension likely WHO group 5, 2, 3  Moderate COPD Gold stage II, with restriction on PFTs 2017.  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 Orders placed for new neb machine for home  Follow chest imaging, CTA chest reviewed from 7/12/2022 some scarring likely related to sarcoidosis, small pleural effusions  respiratory viral panel negative, sed rate 20, CRP elevated at 8. Procalcitonin 0.17.  monitor off antibiotics. Received IV Lasix x 1  in ED. Start Lasix 40 IV BID. Cardiology consult placed for management of heart failure. 2D echo ordered-pending  DVT, GI prophylaxis  Pain management per primary service  Prednisone 5mg daily  Will need close follow up after DC: repeat PFTs, 6 minute walk test will not be completed, largely wheelchair-bound, sleep study, and Right heart cath when more euvolemic to further evaluate Pulm HTN.      Electronically signed by Arnetta Halsted, APRN - CNP on 7/17/2022 at 8:43 AM

## 2022-07-17 NOTE — PROGRESS NOTES
[facility] Cardiology Inpatient Progress Note    Patient is a 73 y.o. female of Karen Ulrich MD seen in hospital follow up. Chief complaint: Afib    HPI: Some SOB. No CP.      Patient Active Problem List   Diagnosis    Chronic back pain    Primary hypothyroidism    Nephrosclerosis    Diabetes insipidus, neurohypophyseal ([address]    Sarcoidosis    Steroid-induced avascular necrosis of shoulder ([address]    Anemia due to chronic illness    Chronic pain syndrome    Bilateral hip pain    Debility    Bright red rectal bleeding    Hypertensive pulmonary vascular disease    Benign essential hypertension    Chronic kidney disease, stage III (moderate) (HCC)    PAF (paroxysmal atrial fibrillation) ([facility] currently in NSR    Mixed hyperlipidemia    Chronic combined systolic and diastolic heart failure (HCC)    Chronic respiratory failure with hypoxia (HCC)    Mixed simple and mucopurulent chronic bronchitis (HCC)    Recurrent major depressive disorder, in partial remission (HCC)    Gait disturbance    Chronic, continuous use of opioids    PMB (postmenopausal bleeding)    Severe dysplasia of vagina    Epistaxis    Ventral hernia without obstruction or gangrene    Long term (current) use of systemic steroids    Confusion    Acute combined systolic and diastolic CHF, NYHA class 1 (HCC)    Bilateral pleural effusion       No Known Allergies    Current Facility-Administered Medications   Medication Dose Route Frequency Provider Last Rate Last Admin    guaiFENesin-codeine (GUAIFENESIN AC) 100-10 MG/5ML liquid 5 mL  5 mL Oral Q4H PRN FRIDA Mario CNP   5 mL at 07/16/22 2229    perflutren lipid microspheres (DEFINITY) injection 1.65 mg  1.5 mL IntraVENous ONCE PRN Brenda Ellis MD        furosemide (LASIX) injection 40 mg  40 mg IntraVENous BID FRIDA Allen CNP   40 mg at 07/16/22 1648    dilTIAZem (CARDIZEM) tablet 30 mg  30 mg Oral 4 times per day FRIDA Thakur CNP   30 mg at 07/17/22 0555    white petrolatum ointment   Topical BID Karen Kraus MD   Given at 07/16/22 2039    oxyCODONE-acetaminophen (PERCOCET) 5-325 MG per tablet 1 tablet  1 tablet Oral Q6H PRN Jyoti Johns MD   1 tablet at 07/16/22 2229    Arformoterol Tartrate (BROVANA) nebulizer solution 15 mcg  15 mcg Nebulization BID Jyoti Johns MD   15 mcg at 07/17/22 0839    budesonide (PULMICORT) nebulizer suspension 250 mcg  250 mcg Nebulization BID Jyoti Johns MD   250 mcg at 07/17/22 1778    desmopressin (DDAVP) tablet 200 mcg  200 mcg Oral BID Jyoti Johns MD   200 mcg at 07/16/22 2039    docusate sodium (COLACE) capsule 100 mg  100 mg Oral BID Jyoti Johns MD   100 mg at 07/16/22 2038    escitalopram (LEXAPRO) tablet 10 mg  10 mg Oral BID Jyoti Johns MD   10 mg at 07/16/22 2038    famotidine (PEPCID) tablet 20 mg  20 mg Oral BID Jyoti Johns MD   20 mg at 07/16/22 2038    ipratropium-albuterol (DUONEB) nebulizer solution 1 ampule  1 ampule Inhalation Q4H WA Jyoti Johns MD   1 ampule at 07/17/22 0840    levothyroxine (SYNTHROID) tablet 75 mcg  75 mcg Oral Daily Jyoti Johns MD   75 mcg at 07/17/22 0555    metoprolol succinate (TOPROL XL) extended release tablet 150 mg  150 mg Oral Daily Jyoti Johns MD   150 mg at 07/16/22 2038    predniSONE (DELTASONE) tablet 5 mg  5 mg Oral Daily Jyoti Johns MD   5 mg at 07/16/22 4164       Review of systems:   Heart: as above   Lungs: as above   Eyes: denies changes in vision or discharge. Ears: denies changes in hearing or pain. Nose: denies epistaxis or masses   Throat: denies sore throat or trouble swallowing. Neuro: denies numbness, tingling, tremors. Skin: denies rashes or itching. : denies hematuria, dysuria   GI: denies vomiting, diarrhea   Psych: denies mood changed, anxiety, depression.     Physical Exam   BP (!) 121/90   Pulse 87   Temp 97.1 °F (36.2 °C) (Temporal)   Resp 16   Ht 5' (1.524 m)   Wt 193 lb (87.5 kg)   LMP  (LMP Unknown) SpO2 98%   BMI 37.69 kg/m²   Constitutional: Oriented to person, place, and time. No distress. Well developed. Head: Normocephalic and atraumatic. Neck: Neck supple. No hepatojugular reflux. No JVD present. Carotid bruit is not present. No tracheal deviation present. No thyromegaly present. Cardiovascular: Normal rate, regular rhythm, normal heart sounds. intact distal pulses. No gallop and no friction rub. No murmur heard. Pulmonary: Breath sounds normal. No respiratory distress. No wheezes. No rales. Chest: Effort normal. No tenderness. Abdominal: Soft. Bowel sounds are normal. No distension or mass. No tenderness, rebound or guarding. Musculoskeletal: . No tenderness. No clubbing or cyanosis. Extremitites: Intact distal pulses. No edema  Neurological: Alert and oriented to person, place, and time. Skin: Skin is warm and dry. No rash noted. Not diaphoretic. No erythema. Psychiatric: Normal mood and affect. Behavior is normal.   Lymphadenopathy: No cervical adenopathy. No groin adenopathy.     CBC:   Lab Results   Component Value Date/Time    WBC 4.4 07/14/2022 08:12 PM    RBC 3.43 07/14/2022 08:12 PM    RBC 3.57 12/14/2021 09:57 AM    HGB 8.9 07/14/2022 08:12 PM    HCT 30.6 07/14/2022 08:12 PM    MCV 89.2 07/14/2022 08:12 PM    MCH 25.9 07/14/2022 08:12 PM    MCHC 29.1 07/14/2022 08:12 PM    RDW 14.5 07/14/2022 08:12 PM     07/14/2022 08:12 PM    MPV 11.0 07/14/2022 08:12 PM     BMP:   Lab Results   Component Value Date/Time     07/17/2022 03:40 AM    K 3.0 07/17/2022 03:40 AM    K 3.8 07/14/2022 08:12 PM    CL 94 07/17/2022 03:40 AM    CO2 38 07/17/2022 03:40 AM    BUN 12 07/17/2022 03:40 AM    LABALBU 3.3 07/17/2022 03:40 AM    LABALBU 3.6 05/02/2012 07:40 PM    CREATININE 1.0 07/17/2022 03:40 AM    CALCIUM 8.7 07/17/2022 03:40 AM    GFRAA >60 07/17/2022 03:40 AM    LABGLOM >60 07/17/2022 03:40 AM     Magnesium:    Lab Results   Component Value Date/Time    MG 1.8 07/11/2022 05:18 AM     Cardiac Enzymes:   Lab Results   Component Value Date    CKTOTAL 64 08/23/2018    CKTOTAL 67 08/24/2017    CKTOTAL 47 10/03/2016    CKMB 2.2 08/24/2017    CKMB 1.3 10/03/2016    CKMB 1.6 10/03/2016    TROPHS 37 (H) 07/14/2022    TROPHS 43 (H) 07/14/2022    TROPHS 15 (H) 03/11/2022      PT/INR:    Lab Results   Component Value Date/Time    PROTIME 11.0 03/11/2022 11:29 AM    PROTIME 12.7 05/02/2012 07:40 PM    INR 1.0 03/11/2022 11:29 AM     TSH:    Lab Results   Component Value Date/Time    TSH 0.041 07/16/2022 03:20 AM     Rhythm Strip: atrial fibrillation. Echo, 01/16/2017, EF normal.  Stage 2 diastolic dysfunction. Mild LA dilatation. Mild MR and TR. TR velocity 4.56 m/s. Consistent with severe pulmonary hypertension    Echo Summary 6/16/20:   EF 60%   Study completed for sarcoidosis and pulmonary hypertension. There is evidence for pulmonary hypertension. There is no evidence for cardiac sarcoidosis on this study. Concentric remodeling. Normal left ventricular systolic function. No wall motion abnormalities or thinning in a non coronary distribution that would be consistent with cardiac sarcoidosis. Normal diastolic function. Normal left atrial pressure. Mild right ventricular systolic dysfunction. TAPSE is 1.2 cm, TDI s' is 9 cm/s, and the RV free wall is mildly dysfunctional.   RVSP is 69 mm Hg. Mildly redundant mitral valve leaflets. Mild mitral regurgitation. Mildly redundant tricuspid valve leaflets. Mild to moderate tricuspid valve. Central regurgitant jet. Small inferior pericardial effusion. No tamponade physiology.        ASSESSMENT & PLAN:    Patient Active Problem List   Diagnosis    Chronic back pain    Primary hypothyroidism    Nephrosclerosis    Diabetes insipidus, neurohypophyseal ([address]    Sarcoidosis    Steroid-induced avascular necrosis of shoulder ([address]    Anemia due to chronic illness    Chronic pain syndrome    Bilateral hip pain    Debility Bright red rectal bleeding    Hypertensive pulmonary vascular disease    Benign essential hypertension    Chronic kidney disease, stage III (moderate) (HCC)    PAF (paroxysmal atrial fibrillation) (HCC) currently in NSR    Mixed hyperlipidemia    Chronic combined systolic and diastolic heart failure (HCC)    Chronic respiratory failure with hypoxia (HCC)    Mixed simple and mucopurulent chronic bronchitis (HCC)    Recurrent major depressive disorder, in partial remission (HCC)    Gait disturbance    Chronic, continuous use of opioids    PMB (postmenopausal bleeding)    Severe dysplasia of vagina    Epistaxis    Ventral hernia without obstruction or gangrene    Long term (current) use of systemic steroids    Confusion    Acute combined systolic and diastolic CHF, NYHA class 1 (HCC)    Bilateral pleural effusion     1. Permanent Afib: Rate control. CHADS2 Vasc score 4. Eliquis held due to anemia. 2. Chest Symptoms:     Eventual ischemia evaluation. 3. Acute on chronic HFpEF:    Diurese with IV lasix. 4. Pulmonary sarcoidosis: No evidence of cardiac sarcoidosis based on cardiac MRI done in 2016. On prednisone    5. HTN: Observe. 6. CKD: Follow labs. 7. Hypothyroidism HRT    8. Hx of GI bleed    9. Hx of PE    10. PH: Moderate PH.    11. Hx of diabetes insipidus: On desmopressin     Jaiden Thrasher D.O.   Cardiologist  Cardiology, [facility]

## 2022-07-18 VITALS
SYSTOLIC BLOOD PRESSURE: 105 MMHG | OXYGEN SATURATION: 92 % | DIASTOLIC BLOOD PRESSURE: 86 MMHG | HEART RATE: 78 BPM | BODY MASS INDEX: 37.56 KG/M2 | RESPIRATION RATE: 20 BRPM | HEIGHT: 60 IN | TEMPERATURE: 98.5 F | WEIGHT: 191.3 LBS

## 2022-07-18 LAB
ALBUMIN SERPL-MCNC: 3.4 G/DL (ref 3.5–5.2)
ALP BLD-CCNC: 107 U/L (ref 35–104)
ALT SERPL-CCNC: 31 U/L (ref 0–32)
ANION GAP SERPL CALCULATED.3IONS-SCNC: 7 MMOL/L (ref 7–16)
AST SERPL-CCNC: 20 U/L (ref 0–31)
B.E.: 20.3 MMOL/L (ref -3–3)
BILIRUB SERPL-MCNC: 0.3 MG/DL (ref 0–1.2)
BUN BLDV-MCNC: 19 MG/DL (ref 6–23)
CALCIUM SERPL-MCNC: 8.8 MG/DL (ref 8.6–10.2)
CHLORIDE BLD-SCNC: 92 MMOL/L (ref 98–107)
CO2: 42 MMOL/L (ref 22–29)
COHB: 1 % (ref 0–1.5)
CREAT SERPL-MCNC: 1.1 MG/DL (ref 0.5–1)
CRITICAL: ABNORMAL
DATE ANALYZED: ABNORMAL
DATE OF COLLECTION: ABNORMAL
GFR AFRICAN AMERICAN: >60
GFR NON-AFRICAN AMERICAN: >60 ML/MIN/1.73
GLUCOSE BLD-MCNC: 97 MG/DL (ref 74–99)
HCO3: 47 MMOL/L (ref 22–26)
HHB: 9.1 % (ref 0–5)
LAB: ABNORMAL
Lab: ABNORMAL
METHB: 0.3 % (ref 0–1.5)
MODE: ABNORMAL
O2 CONTENT: 14.5 ML/DL
O2 SATURATION: 90.8 % (ref 92–98.5)
O2HB: 89.6 % (ref 94–97)
OPERATOR ID: ABNORMAL
PATIENT TEMP: 37 C
PCO2: 63.8 MMHG (ref 35–45)
PH BLOOD GAS: 7.49 (ref 7.35–7.45)
PO2: 60.4 MMHG (ref 75–100)
POTASSIUM SERPL-SCNC: 3 MMOL/L (ref 3.5–5)
SODIUM BLD-SCNC: 141 MMOL/L (ref 132–146)
SOURCE, BLOOD GAS: ABNORMAL
THB: 11.5 G/DL (ref 11.5–16.5)
TIME ANALYZED: 1104
TOTAL PROTEIN: 6 G/DL (ref 6.4–8.3)

## 2022-07-18 PROCEDURE — 36415 COLL VENOUS BLD VENIPUNCTURE: CPT

## 2022-07-18 PROCEDURE — 82805 BLOOD GASES W/O2 SATURATION: CPT

## 2022-07-18 PROCEDURE — 2700000000 HC OXYGEN THERAPY PER DAY

## 2022-07-18 PROCEDURE — 6360000002 HC RX W HCPCS: Performed by: NURSE PRACTITIONER

## 2022-07-18 PROCEDURE — 6370000000 HC RX 637 (ALT 250 FOR IP): Performed by: NURSE PRACTITIONER

## 2022-07-18 PROCEDURE — 6370000000 HC RX 637 (ALT 250 FOR IP): Performed by: INTERNAL MEDICINE

## 2022-07-18 PROCEDURE — 94640 AIRWAY INHALATION TREATMENT: CPT

## 2022-07-18 PROCEDURE — 80053 COMPREHEN METABOLIC PANEL: CPT

## 2022-07-18 PROCEDURE — 6360000002 HC RX W HCPCS: Performed by: INTERNAL MEDICINE

## 2022-07-18 PROCEDURE — 94660 CPAP INITIATION&MGMT: CPT

## 2022-07-18 PROCEDURE — 99239 HOSP IP/OBS DSCHRG MGMT >30: CPT | Performed by: INTERNAL MEDICINE

## 2022-07-18 RX ORDER — FUROSEMIDE 40 MG/1
40 TABLET ORAL DAILY
Status: DISCONTINUED | OUTPATIENT
Start: 2022-07-19 | End: 2022-07-18 | Stop reason: HOSPADM

## 2022-07-18 RX ORDER — FUROSEMIDE 40 MG/1
40 TABLET ORAL DAILY
Qty: 60 TABLET | Refills: 3 | Status: SHIPPED | OUTPATIENT
Start: 2022-07-19 | End: 2022-09-12

## 2022-07-18 RX ORDER — ACETAZOLAMIDE 500 MG/5ML
250 INJECTION, POWDER, LYOPHILIZED, FOR SOLUTION INTRAVENOUS ONCE
Status: COMPLETED | OUTPATIENT
Start: 2022-07-18 | End: 2022-07-18

## 2022-07-18 RX ORDER — POTASSIUM CHLORIDE 20 MEQ/1
40 TABLET, EXTENDED RELEASE ORAL ONCE
Status: COMPLETED | OUTPATIENT
Start: 2022-07-18 | End: 2022-07-18

## 2022-07-18 RX ADMIN — DILTIAZEM HYDROCHLORIDE 30 MG: 30 TABLET, FILM COATED ORAL at 05:49

## 2022-07-18 RX ADMIN — ARFORMOTEROL TARTRATE 15 MCG: 15 SOLUTION RESPIRATORY (INHALATION) at 08:24

## 2022-07-18 RX ADMIN — IPRATROPIUM BROMIDE AND ALBUTEROL SULFATE 1 AMPULE: .5; 2.5 SOLUTION RESPIRATORY (INHALATION) at 12:33

## 2022-07-18 RX ADMIN — POTASSIUM CHLORIDE 20 MEQ: 20 TABLET, EXTENDED RELEASE ORAL at 09:32

## 2022-07-18 RX ADMIN — PETROLATUM: 420 OINTMENT TOPICAL at 09:32

## 2022-07-18 RX ADMIN — DESMOPRESSIN ACETATE 200 MCG: 0.1 TABLET ORAL at 09:32

## 2022-07-18 RX ADMIN — ACETAZOLAMIDE 250 MG: 500 INJECTION, POWDER, LYOPHILIZED, FOR SOLUTION INTRAVENOUS at 13:04

## 2022-07-18 RX ADMIN — FUROSEMIDE 40 MG: 10 INJECTION, SOLUTION INTRAMUSCULAR; INTRAVENOUS at 09:31

## 2022-07-18 RX ADMIN — ESCITALOPRAM OXALATE 10 MG: 10 TABLET ORAL at 09:32

## 2022-07-18 RX ADMIN — IPRATROPIUM BROMIDE AND ALBUTEROL SULFATE 1 AMPULE: .5; 2.5 SOLUTION RESPIRATORY (INHALATION) at 15:33

## 2022-07-18 RX ADMIN — IPRATROPIUM BROMIDE AND ALBUTEROL SULFATE 1 AMPULE: .5; 2.5 SOLUTION RESPIRATORY (INHALATION) at 08:24

## 2022-07-18 RX ADMIN — FAMOTIDINE 20 MG: 20 TABLET ORAL at 09:32

## 2022-07-18 RX ADMIN — DOCUSATE SODIUM 100 MG: 100 CAPSULE, LIQUID FILLED ORAL at 09:31

## 2022-07-18 RX ADMIN — BUDESONIDE 250 MCG: 0.25 SUSPENSION RESPIRATORY (INHALATION) at 08:24

## 2022-07-18 RX ADMIN — PREDNISONE 5 MG: 5 TABLET ORAL at 09:32

## 2022-07-18 RX ADMIN — GUAIFENESIN AND CODEINE PHOSPHATE 5 ML: 10; 100 LIQUID ORAL at 06:35

## 2022-07-18 RX ADMIN — OXYCODONE AND ACETAMINOPHEN 1 TABLET: 5; 325 TABLET ORAL at 11:47

## 2022-07-18 RX ADMIN — DILTIAZEM HYDROCHLORIDE 30 MG: 30 TABLET, FILM COATED ORAL at 11:47

## 2022-07-18 RX ADMIN — LEVOTHYROXINE SODIUM 75 MCG: 75 TABLET ORAL at 05:49

## 2022-07-18 RX ADMIN — POTASSIUM CHLORIDE 40 MEQ: 20 TABLET, EXTENDED RELEASE ORAL at 13:33

## 2022-07-18 RX ADMIN — OXYCODONE AND ACETAMINOPHEN 1 TABLET: 5; 325 TABLET ORAL at 05:49

## 2022-07-18 ASSESSMENT — PAIN SCALES - GENERAL
PAINLEVEL_OUTOF10: 7
PAINLEVEL_OUTOF10: 7

## 2022-07-18 NOTE — HOME CARE
Patient will need home health orders at discharge. Patient rehospitalized prior to home health start of care.  Thank you, [facility]

## 2022-07-18 NOTE — PROGRESS NOTES
[place] M.D.,[facility]  Daniel Oliveira D.O., F.A.C.O.I., Roxann Castro M.D. Sharon Lynne M.D. Shaila Johnson D.O. Daily Pulmonary Progress Note    Patient:  Pedro Luis Mcdonnell 73 y.o. female MRN: [id number]     Date of Service: 7/18/2022      Synopsis     We are following patient for +bronchiectasis, sarcoidosis, on chronic prednisone, ? DMARD management. Needs pulm doc, worsening HICKEY, rule out infection/lyndsey aerosols? AVAPS?? \"CC\" shortness of breath    Code status: Full code      Subjective      Patient was seen and examined lying in bed in no apparent distress. Oxygen remains on 1 L nasal cannula. No cough or mucus. Switch to oral diuretic therapy for discharge. Received 1 dose of Diamox for contraction alkalosis. Repeat ABGs reviewed. Review of Systems:  Constitutional: Denies fever, weight loss, night sweats, and fatigue  Skin: Denies pigmentation, dark lesions, and rashes   HEENT: Denies hearing loss, tinnitus, ear drainage, epistaxis, sore throat, and hoarseness. Cardiovascular: Denies palpitations, chest pain, and chest pressure. Respiratory: Shortness of breath, cough   gastrointestinal: Denies nausea, vomiting, poor appetite, diarrhea, heartburn or reflux  Genitourinary: Denies dysuria, frequency, urgency or hematuria  Musculoskeletal: Chronic myalgia and fatigue, chronic back pain, general debility and Deconditioning, wheelchair-bound  Neurological: Denies dizziness, vertigo, headache, and focal weakness  Psychological: Denies anxiety, history of depression  Endocrine: Denies heat intolerance and cold intolerance  Hematopoietic/Lymphatic: Denies bleeding problems and blood transfusions.   Unable to take Eliquis for A. fib due to history of anemia and GI bleed    24-hour events:  None    Objective   Vitals: /86   Pulse 78   Temp 98.5 °F (36.9 °C) (Oral)   Resp 20   Ht 5' (1.524 m)   Wt 191 lb 4.8 oz (86.8 kg)   LMP  (LMP Unknown)   SpO2 92%   BMI 37.36 kg/m²     I/O:    Intake/Output Summary (Last 24 hours) at 7/18/2022 1414  Last data filed at 7/18/2022 0552  Gross per 24 hour   Intake --   Output 800 ml   Net -800 ml                    CPAP/EPAP: 8 cmH2O     CURRENT MEDS :  Scheduled Meds:   [START ON 7/19/2022] furosemide  40 mg Oral Daily    potassium chloride  20 mEq Oral BID WC    dilTIAZem  30 mg Oral 4 times per day    white petrolatum   Topical BID    Arformoterol Tartrate  15 mcg Nebulization BID    budesonide  250 mcg Nebulization BID    desmopressin  200 mcg Oral BID    docusate sodium  100 mg Oral BID    escitalopram  10 mg Oral BID    famotidine  20 mg Oral BID    ipratropium-albuterol  1 ampule Inhalation Q4H WA    levothyroxine  75 mcg Oral Daily    metoprolol succinate  150 mg Oral Daily    predniSONE  5 mg Oral Daily       Physical Exam:  General Appearance: appears comfortable in no acute distress. HEENT: Normocephalic atraumatic without obvious abnormality   Neck: Lips, mucosa, and tongue normal.  Supple, symmetrical, trachea midline, no adenopathy;thyroid:  no enlargement/tenderness/nodules or JVD. Lung: Breath sounds diminished. Respirations   unlabored. Symmetrical expansion. Heart: RRR, normal S1, S2. No MRG  Abdomen: Soft, NT, ND. BS present x 4 quadrants. No bruit or organomegaly. Abdominal wound  Extremities: Pedal pulses 2+ symmetric b/l. Extremities normal, no cyanosis, clubbing. 1-2+ pitting edema lower extremities bilaterally  Musculokeletal: No joint swelling, no muscle tenderness. ROM normal in all joints of extremities. Neurologic: Mental status: Alert and Oriented X3 . Pertinent/ New Labs and Imaging Studies     Imaging Personally Reviewed:  Chest x-ray 7/14/2022      FINDINGS:   The lungs are without acute focal process. There is no effusion or   pneumothorax. Stable heart size. The osseous structures are without acute   process. Impression   No acute process. No definitive evidence for pneumonia. CTA chest 7/12/2022  Impression   CHEST:       No central pulmonary embolism; the distal pulmonary arteries are not well   evaluated due to breathing motion artifact. Enlarged pulmonary trunk and   pulmonary arteries are again seen, suggestive of pulmonary arterial   hypertension. Right heart chambers are also enlarged in keeping with the   same. No thoracic aortic aneurysm or dissection. New trace pleural effusions. Mild bilateral atelectasis. Other incidental findings as above. Echo:  Conclusions      Summary   Study completed for sarcoidosis and pulmonary hypertension. There is evidence for pulmonary hypertension. There is no evidence for cardiac sarcoidosis on this study. Concentric remodeling. Normal left ventricular systolic function. No wall motion abnormalities or thinning in a non coronary distribution   that would be consistent with cardiac sarcoidosis. Normal diastolic function. Normal left atrial pressure. Mild right ventricular systolic dysfunction. TAPSE is 1.2 cm, TDI s' is 9 cm/s, and the RV free wall is mildly   dysfunctional.   RVSP is 69 mm Hg. Mildly redundant mitral valve leaflets. Mild mitral regurgitation. Mildly redundant tricuspid valve leaflets. Mild to moderate tricuspid valve. Central regurgitant jet. Small inferior pericardial effusion. No tamponade physiology.     Labs:  Lab Results   Component Value Date/Time    WBC 4.4 07/14/2022 08:12 PM    HGB 8.9 07/14/2022 08:12 PM    HCT 30.6 07/14/2022 08:12 PM    MCV 89.2 07/14/2022 08:12 PM    MCH 25.9 07/14/2022 08:12 PM    MCHC 29.1 07/14/2022 08:12 PM    RDW 14.5 07/14/2022 08:12 PM     07/14/2022 08:12 PM    MPV 11.0 07/14/2022 08:12 PM     Lab Results   Component Value Date/Time     07/18/2022 04:15 AM    K 3.0 07/18/2022 04:15 AM    K 3.8 07/14/2022 08:12 PM    CL 92 07/18/2022 04:15 AM    CO2 42 07/18/2022 04:15 AM    BUN 19 07/18/2022 04:15 AM    CREATININE 1.1 07/18/2022 04:15 AM    LABALBU 3.4 07/18/2022 04:15 AM    LABALBU 3.6 05/02/2012 07:40 PM    CALCIUM 8.8 07/18/2022 04:15 AM    GFRAA >60 07/18/2022 04:15 AM    LABGLOM >60 07/18/2022 04:15 AM     Lab Results   Component Value Date/Time    PROTIME 11.0 03/11/2022 11:29 AM    PROTIME 12.7 05/02/2012 07:40 PM    INR 1.0 03/11/2022 11:29 AM     No results for input(s): PROBNP in the last 72 hours. No results for input(s): PROCAL in the last 72 hours. This SmartLink has not been configured with any valid records. Micro:  No results for input(s): CULTRESP in the last 72 hours. No results for input(s): LABGRAM in the last 72 hours. No results for input(s): LEGUR in the last 72 hours. No results for input(s): STREPNEUMAGU in the last 72 hours. No results for input(s): LP1UAG in the last 72 hours. Assessment:    Chronic respiratory failure with hypoxia-Home O2 dependence 4 L nasal cannula continuous  Severe pulmonary sarcoidosis originally diagnosed 1998, lung biopsy 2001, based on prior CT imaging thought to be fibrotic component therefore leflunomide and prednisone were discontinued August 2017  Severe Pulmonary hypertension likely WHO group 5, 2, 3  Moderate COPD Gold stage II, with restriction on PFTs 2017.  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 Orders placed for new nebulizer machine for home  CTA chest reviewed from 7/12/2022 some scarring likely related to sarcoidosis, small pleural effusions  Lasix switched to oral, will need to continue upon discharge. Received 1 dose of Diamox today for contraction alkalosis. Repeat ABG 7.48/63/60/47/20/90% on 1 L nasal cannula  DVT, GI prophylaxis  Pain management per primary service  Prednisone 5mg daily  Will need close follow up after DC with Dr Karl Zaragoza: repeat PFTs, 6 minute walk test will not be completed, largely wheelchair-bound, sleep study, and Right heart cath when more euvolemic to further evaluate Pulm HTN. Electronically signed by FRIDA Davis CNP on 7/18/2022 at 2:14 PM    I personally saw, examined, and cared for the patient. Labs, medications, radiographs reviewed.  I agree with history exam and plans detailed in NP note with the following additions:    Developed a contraction alkalosis  Stop IV lasix  Diamox x 1 today  Start oral lasix 40mg daily as of tomorrow  She will need outpatient PFTs, 6MWT, and probable Abdulkadir Gaxiola MD

## 2022-07-18 NOTE — PROGRESS NOTES
Date: 7/17/2022    Time: 10:30 PM    Patient Placed On BIPAP/CPAP/ Non-Invasive Ventilation? Yes    If no must comment. Facial area red/color change? No           If YES are Blister/Lesion present? No   If yes must notify nursing staff  BIPAP/CPAP skin barrier?   Yes    Skin barrier type:mepilexlite       Comments:        Virgie Rivera RCP

## 2022-07-18 NOTE — DISCHARGE SUMMARY
[facility] Physician Discharge Summary     Isabelle Reynolds, [address] [facility] 2051 [facility]  [phone]    Schedule an appointment as soon as possible for a visit today  follow up, As needed    Zach Pollard MD  [address] [facility] [phone]    Schedule an appointment as soon as possible for a visit today  follow up, As needed    Sophie Stroud MD  [address] [facility] [address]  [facility] [phone]    Schedule an appointment as soon as possible for a visit today  follow up, As needed    Ronnell Wilhelm MD  [phone]    Schedule an appointment as soon as possible for a visit today  follow up, As needed    Erika Brar, [address] [facility] [phone]    Call in 2 week(s)      Isabelle Reynolds MD  [address] [facility] [phone]    Follow up in 1 week(s)  labs BMP, Magnesium in 1 week    Zach Pollard MD  101 [facility] [id number]  [address] [facility] MD  [address] [phone]          Ronnell Wilhelm MD  [address] [id number]  [address] [facility], [facility] 59  [address]  [phone]    Follow up  As needed    Forest Conte MD  [address] [place] [address] [phone]    Schedule an appointment as soon as possible for a visit      Activity level   As tolerated    Disposition   home      Condition on discharge Stable    Patient ID   Rosie Bryant, 73 y. o.female /  1955  / MRN [id number]    Admit date   2022    Discharge date  2022  3:27 PM    Admission diagnoses Principal Problem:    Acute combined systolic and diastolic CHF, NYHA class 1 (HCC)  Active Problems:    Long term (current) use of systemic steroids    Bilateral pleural effusion    Chronic back pain    Diabetes insipidus, neurohypophyseal ([facility] [address]    Sarcoidosis    Chronic respiratory failure with hypoxia (HCC)  Resolved Problems:    * No resolved hospital problems. *    Discharge diagnoses Same    Consults   IP CONSULT TO SOCIAL WORK  IP CONSULT TO PULMONOLOGY  IP CONSULT TO PALLIATIVE CARE  IP CONSULT TO CARDIOLOGY  IP CONSULT TO DIETITIAN  IP CONSULT TO HOME CARE NEEDS  IP CONSULT TO HOME CARE NEEDS    Procedures   See hospital course    Hospital Course     This is a 73year old female with history of diabetes insipidus, pulmonary sarcoidosis, hypertension, hyperlipidemia, chronic kidney disease, depression, paroxysmal A. fib, hypothyroidism, ventral hernia, and chronic opiate use Patient was seen in [facility] one day prior to admission for similar complaints. CT showed bronchiectasis with concern for pneumonia and she was started on Levaquin renally dosed. CKD secondary to IV contrast dye - improved with IV fluids. One dose of Levaquin given IV, repeat CXR negative and repeat procalcitonin negative. Oxygen status back to baseline - uses 2-4 L via NC at home. Hiatal hernia with chronic abdominal wound, following with wound care outpatient. Chronic opioid use and steroid use. UDS + opiate and oxycodone. Chart review notes that refill is due July 13th, but patient is asking for more pain medication here and at [facility] a few days ago - possibly presented for pain medication due to running out early. Patient is clinically stable for discharge and baseline oxygenation.        CODE: full  Discharge dispo: home [place] [id number]     Discharge Exam  Vitals: /86   Pulse 78   Temp 98.5 °F (36.9 °C) (Oral)   Resp 20   Ht 5' (1.524 m)   Wt 191 lb 4.8 oz (86.8 kg)   LMP  (LMP Unknown)   SpO2 92%   BMI 37.36 kg/m²   General: well-developed, well-nourished, no acute distress, cooperative  Skin: generally warm, dry, and intact, with normal color  HEENT: normocephalic, atraumatic, no gross abnormalities  Respiratory: clear to auscultation bilaterally without respiratory distress  Cardiovascular: regular rate and rhythm without murmur / rub / gallop  Abdominal: soft, nontender, nondistended, normoactive bowel sounds  Extremities: no obvious edema or deformity  Neurologic: awake, alert, no gross deficits  Psychiatric: normal affect, cooperative    I/O last 3 completed shifts:  In: -   Out: 2500 [Urine:2500]  No intake/output data recorded. Labs  Recent Labs     07/16/22  0320 07/17/22  0340 07/18/22  0415    143 141   K 3.2* 3.0* 3.0*   CL 96* 94* 92*   CO2 33* 38* 42*   BUN 14 12 19   CREATININE 1.0 1.0 1.1*   GLUCOSE 109* 75 97   CALCIUM 8.7 8.7 8.8       Imaging  CT HEAD WO CONTRAST    Result Date: 7/14/2022  No acute intracranial abnormality. CT CERVICAL SPINE WO CONTRAST    Result Date: 7/14/2022  No acute abnormality of the cervical spine. CT ABDOMEN PELVIS W IV CONTRAST Additional Contrast? None    Result Date: 7/14/2022  CHEST: No central pulmonary embolism; the distal pulmonary arteries are not well evaluated due to breathing motion artifact. Enlarged pulmonary trunk and pulmonary arteries are again seen, suggestive of pulmonary arterial hypertension. Right heart chambers are also enlarged in keeping with the same. No thoracic aortic aneurysm or dissection. New trace pleural effusions. Mild bilateral atelectasis. Other incidental findings as above. ABDOMEN/PELVIS: Rectal wall thickening again seen, suggestive of a nonspecific proctitis. Consider further evaluation with colonoscopy. Other incidental findings as above. XR CHEST PORTABLE    Result Date: 7/12/2022  No acute process. No definitive evidence for pneumonia. CTA PULMONARY W CONTRAST    Result Date: 7/14/2022  CHEST: No central pulmonary embolism; the distal pulmonary arteries are not well evaluated due to breathing motion artifact. Enlarged pulmonary trunk and pulmonary arteries are again seen, suggestive of pulmonary arterial hypertension.   Right heart chambers are also enlarged in keeping with the same. No thoracic aortic aneurysm or dissection. New trace pleural effusions. Mild bilateral atelectasis. Other incidental findings as above. ABDOMEN/PELVIS: Rectal wall thickening again seen, suggestive of a nonspecific proctitis. Consider further evaluation with colonoscopy. Other incidental findings as above. Patient Instructions     Medication List        START taking these medications      dilTIAZem 30 MG tablet  Commonly known as: CARDIZEM  Take 1 tablet by mouth in the morning and 1 tablet at noon and 1 tablet before bedtime. furosemide 40 MG tablet  Commonly known as: LASIX  Take 1 tablet by mouth in the morning.   Start taking on: July 19, 2022            CHANGE how you take these medications      OXYGEN  Inhale 2 L/min into the lungs as needed (shortness of breath, low oxygen <90%) BMS  What changed:   how much to take  how to take this  reasons to take this            CONTINUE taking these medications      * albuterol (2.5 MG/3ML) 0.083% nebulizer solution  Commonly known as: PROVENTIL  Take 3 mLs by nebulization every 6 hours as needed for Wheezing or Shortness of Breath     * albuterol sulfate  (90 Base) MCG/ACT inhaler  Commonly known as: Proventil HFA  Inhale 2 puffs into the lungs every 6 hours as needed for Wheezing or Shortness of Breath     amLODIPine 5 MG tablet  Commonly known as: NORVASC  Take 1 tablet by mouth nightly     Breo Ellipta 100-25 MCG/INH Aepb inhaler  Generic drug: fluticasone-vilanterol  Inhale 1 puff into the lungs daily     desmopressin 0.1 MG tablet  Commonly known as: DDAVP  Take 2 tablets by mouth 2 times daily     docusate sodium 100 MG capsule  Commonly known as: Colace  Take 1 capsule by mouth 2 times daily     escitalopram 10 MG tablet  Commonly known as: LEXAPRO  Take 1 tablet by mouth 2 times daily     famotidine 20 MG tablet  Commonly known as: PEPCID  Take 1 tablet by mouth 2 times daily     ferrous sulfate 325 (65 Fe) MG tablet  Commonly known as: IRON 325  Take 1 tablet by mouth 2 times daily     levothyroxine 75 MCG tablet  Commonly known as: SYNTHROID  Take 1 tablet by mouth daily     * metoprolol succinate 100 MG extended release tablet  Commonly known as: TOPROL XL  Take 1 tablet by mouth daily     naloxone 4 MG/0.1ML Liqd nasal spray  1 spray by Nasal route as needed for Opioid Reversal     Nebulizer/Tubing/Mouthpiece Kit  1 kit by Does not apply route daily as needed (wheezing)     Omega 3 1000 MG Caps  Take 1 each by mouth daily     omega-3 acid ethyl esters 1 g capsule  Commonly known as: LOVAZA  Take 1 capsule by mouth 2 times daily     oxyCODONE-acetaminophen 5-325 MG per tablet  Commonly known as: Percocet  Take 1 tablet by mouth every 4 hours as needed for Pain (max: 150/month) for up to 30 days. predniSONE 5 MG tablet  Commonly known as: DELTASONE  Take 1 tablet by mouth daily     promethazine-codeine 6.25-10 MG/5ML syrup  Commonly known as: PHENERGAN with CODEINE  Take 5 mLs by mouth 4 times daily as needed for Cough for up to 60 days. sodium chloride 0.65 % nasal spray  Commonly known as: [place]ist Spray  1 spray by Nasal route as needed for Congestion           * This list has 3 medication(s) that are the same as other medications prescribed for you. Read the directions carefully, and ask your doctor or other care provider to review them with you. STOP taking these medications      hydrocortisone 2.5 % Crea rectal cream  Commonly known as: ANUSOL-HC            ASK your doctor about these medications      apixaban 5 MG Tabs tablet  Commonly known as: Eliquis  Take 1 tablet by mouth 2 times daily     cycloSPORINE 0.05 % ophthalmic emulsion  Commonly known as: Restasis  Place 1 drop into both eyes every 12 hours     lidocaine 5 %  Commonly known as: LIDODERM  Place 1 patch onto the skin daily for 10 days 12 hours on, 12 hours off.      * metoprolol succinate 50 MG extended release tablet  Commonly known as: TOPROL XL  TAKE ONE TABLET EVERY DAY WITH THE 100MG     mupirocin 2 % ointment  Commonly known as: BACTROBAN  Apply topically daily           * This list has 1 medication(s) that are the same as other medications prescribed for you. Read the directions carefully, and ask your doctor or other care provider to review them with you.                    Where to Get Your Medications        These medications were sent to [address] [place] [address],[address], [address]  [address] [place] [address]      Phone: [phone]   dilTIAZem 30 MG tablet  furosemide 40 MG tablet       You can get these medications from any pharmacy    Bring a paper prescription for each of these medications  OXYGEN       Note that more than 30 minutes was spent in preparing discharge papers, discussing discharge with patient, medication review, etc.    Electronically signed by Contreras Valdez MD on 7/18/2022 at 3:27 PM

## 2022-07-18 NOTE — CARE COORDINATION
Social Work/Discharge Planning:  Discharge order noted. Met with patient and confirmed plan is home and she is not interested in rehab. She prefers home with [facility]. [facility] order noted. Notified Nbaa with [facility] of orders.   Electronically signed by JIGAR Ayala on 7/18/2022 at 10:43 AM

## 2022-07-18 NOTE — DISCHARGE INSTRUCTIONS
Follow up with bmp and magnesium level in 1 wk, to be managed by Dr. Brian Matamoros her primary.  Needs follow up 1 wk with pcp

## 2022-07-19 ENCOUNTER — TELEPHONE (OUTPATIENT)
Dept: ADMINISTRATIVE | Age: 67
End: 2022-07-19

## 2022-08-11 PROBLEM — H25.10 NUCLEAR SENILE CATARACT: Status: ACTIVE | Noted: 2022-04-11

## 2022-08-15 ENCOUNTER — OFFICE VISIT (OUTPATIENT)
Dept: FAMILY MEDICINE CLINIC | Age: 67
End: 2022-08-15
Payer: MEDICARE

## 2022-08-15 VITALS
BODY MASS INDEX: 32.06 KG/M2 | HEIGHT: 60 IN | WEIGHT: 163.3 LBS | TEMPERATURE: 98.8 F | DIASTOLIC BLOOD PRESSURE: 68 MMHG | SYSTOLIC BLOOD PRESSURE: 124 MMHG | OXYGEN SATURATION: 95 % | HEART RATE: 64 BPM

## 2022-08-15 DIAGNOSIS — L28.0 LICHENIFICATION: ICD-10-CM

## 2022-08-15 DIAGNOSIS — J18.9 PNEUMONIA DUE TO INFECTIOUS ORGANISM, UNSPECIFIED LATERALITY, UNSPECIFIED PART OF LUNG: Primary | ICD-10-CM

## 2022-08-15 DIAGNOSIS — Z76.0 MEDICATION REFILL: ICD-10-CM

## 2022-08-15 DIAGNOSIS — F11.90 CHRONIC, CONTINUOUS USE OF OPIOIDS: ICD-10-CM

## 2022-08-15 PROCEDURE — 1036F TOBACCO NON-USER: CPT | Performed by: FAMILY MEDICINE

## 2022-08-15 PROCEDURE — G8399 PT W/DXA RESULTS DOCUMENT: HCPCS | Performed by: FAMILY MEDICINE

## 2022-08-15 PROCEDURE — 1111F DSCHRG MED/CURRENT MED MERGE: CPT | Performed by: FAMILY MEDICINE

## 2022-08-15 PROCEDURE — 99213 OFFICE O/P EST LOW 20 MIN: CPT | Performed by: FAMILY MEDICINE

## 2022-08-15 PROCEDURE — 1124F ACP DISCUSS-NO DSCNMKR DOCD: CPT | Performed by: FAMILY MEDICINE

## 2022-08-15 PROCEDURE — 1090F PRES/ABSN URINE INCON ASSESS: CPT | Performed by: FAMILY MEDICINE

## 2022-08-15 PROCEDURE — 3017F COLORECTAL CA SCREEN DOC REV: CPT | Performed by: FAMILY MEDICINE

## 2022-08-15 PROCEDURE — G8417 CALC BMI ABV UP PARAM F/U: HCPCS | Performed by: FAMILY MEDICINE

## 2022-08-15 PROCEDURE — G8427 DOCREV CUR MEDS BY ELIG CLIN: HCPCS | Performed by: FAMILY MEDICINE

## 2022-08-15 NOTE — PROGRESS NOTES
[facility]  Department of [facility]  Family Medicine Residency Program    Date of Visit: 8/15/2022     Chief Complaint     Chief Complaint   Patient presents with    Follow-up     Patient is here today to follow up for chronic conditions. Went to the ER three times since last OV and was admitted two times from 7/10- and -. Medications have been reviewed. Went from  using 4 L of oxygen last month to 2 L of oxygen now. History of Present Illness     HPI:  73 y.o. female with multiple comorbidities who presents for monthly follow up for long term opioid use. Chronic pain syndrome  Patient normally takes 5-325 percocet 5 times daily and does also take phenergan with codiene  Her pain is under control at this time  Of note , she was recently in the hospital for pneumonia and acute hypoxic respiratory failure ; being set up to be seen by multiple specialists including infectious disease, wound care and general surgery. Would like refills on her medications today    Atrial Fibrillation, off eliquis  Patient was previously started on eliquis for A fib, though not taking due to bleeding reaction  Due to concern for bleeding patient and  continue to refuse blood thinning medications    Requirement of ID referral  Of note, patient normally has followed Dr. Anabel Ledesma though at this time requiring a new referral to be set up with him again. No other problems or concerns at this time. Social History     Tobacco Use    Smoking status: Former     Packs/day: 0.75     Years: 25.00     Pack years: 18.75     Types: Cigarettes     Start date:      Quit date: 9/10/1996     Years since quittin.9    Smokeless tobacco: Never    Tobacco comments:     Patient quit smoking 26 yrs.ago.    Vaping Use    Vaping Use: Never used    Passive vaping exposure: Yes   Substance Use Topics    Alcohol use: Never     Alcohol/week: 0.0 standard drinks    Drug use: Yes     Comment: Tatiana spears       ROS:    Reviewed, pertinent as above otherwise negative    Objective:    VS:  Blood pressure 124/68, pulse 64, temperature 98.8 °F (37.1 °C), temperature source Temporal, height 5' (1.524 m), weight 163 lb 4.8 oz (74.1 kg), SpO2 95 %, not currently breastfeeding. Physical Exam  Constitutional:       Comments: Wheelchair bound, alert, oriented   Cardiovascular:      Rate and Rhythm: Normal rate and regular rhythm. Pulses: Normal pulses. Heart sounds: Normal heart sounds. No murmur heard. No gallop. Pulmonary:      Effort: Pulmonary effort is normal. No respiratory distress. Breath sounds: Normal breath sounds. No wheezing. Abdominal:      General: Bowel sounds are normal. There is no distension. Palpations: Abdomen is soft. Tenderness: There is no abdominal tenderness. Musculoskeletal:      Comments: Strength 5/5 bilateral upper extremities   Skin:     Comments: Right ankle lichenification noted, non tender, non erythematous     Assessment/Plan:    1. Chronic, continuous use of opioids  Refill percocet at this time  Continue to titrate down dose as appropriate  Patient did also ask for phenergan this visit  PDMP reviewed ; she recently had a refill of phenergan on August 1 2022 , it has not been more than 2 weeks  Advised patient that this medication should be lasting close to a month  Will not be refilling the dose for that medication at this time  - oxyCODONE-acetaminophen (PERCOCET) 5-325 MG per tablet; Take 1 tablet by mouth every 4 hours as needed for Pain (max: 150/month) for up to 30 days. Dispense: 150 tablet; Refill: 0    2. Pneumonia due to infectious organism, unspecified laterality, unspecified part of lung  Referral to doctor buck placed per patient request  - ([facility]) - Alexa Payne MD, Infectious DiseaseAyleen ([facility])    3. Medication refill  Refill lovaza  - omega-3 acid ethyl esters (LOVAZA) 1 g capsule;  Take 1 capsule by mouth in the morning and 1 capsule before bedtime. Dispense: 60 capsule; Refill: 2    4. Lichenification  Trial low dose hydrocortisone for lichenification   - hydrocortisone 2.5 % cream; Apply topically 2 times daily. Dispense: 45 g; Refill: 0    RTO 1 month or sooner prn for any persistent, new, or worsening symptoms. Please see Patient Instructions for further counseling and information given. Advised to please be adherent to the treatment plans discussed today, and please call with any questions or concerns, letting the office know of any reasons that the plans may not be followed. The risks of untreated conditions include worsening illness, injury, disability, and possibly, death. Please call if symptoms change in any way, worsen, or fail to completely resolve, as this could necessitate a change to treatment plans. Patient and/or caregiver expressed understanding. Indications and proper use of medication(s) reviewed. Potential side-effects and risks of medication(s) also explained. Patient and/or caregiver was instructed to call if any new symptoms develop prior to next visit. Health risk factors discussed and addressed.      Electronically signed by Elana Hanna MD PGY-3 on 8/15/2022 at 3:59 PM  This case was discussed with attending physician: Dr. Maria Victoria English

## 2022-08-15 NOTE — PROGRESS NOTES
S: 73 y.o. female here for percocet. Wants opioid dose increased. PNA in hospital recently. Needs ID referral.     O: VS: /68   Pulse 64   Temp 98.8 °F (37.1 °C) (Temporal)   Ht 5' (1.524 m)   Wt 163 lb 4.8 oz (74.1 kg)   LMP  (LMP Unknown)   SpO2 95%   BMI 31.89 kg/m²    General: NAD, alert and interacting appropriately. CV:  RRR, no gallops, rubs, or murmurs    Resp: CTAB   Abd:  Soft, nontender   Ext:  No edema    Impression: opioid dependence   Plan:   Taper down opioid slowly      Attending Physician Statement  I have discussed the case, including pertinent history and exam findings with the resident. I agree with the documented assessment and plan.

## 2022-08-16 ENCOUNTER — TELEPHONE (OUTPATIENT)
Dept: FAMILY MEDICINE CLINIC | Age: 67
End: 2022-08-16

## 2022-08-16 RX ORDER — OXYCODONE HYDROCHLORIDE AND ACETAMINOPHEN 5; 325 MG/1; MG/1
1 TABLET ORAL EVERY 4 HOURS PRN
Qty: 150 TABLET | Refills: 0 | Status: ON HOLD
Start: 2022-08-16 | End: 2022-09-16 | Stop reason: HOSPADM

## 2022-08-16 RX ORDER — OMEGA-3-ACID ETHYL ESTERS 1 G/1
1 CAPSULE, LIQUID FILLED ORAL 2 TIMES DAILY
Qty: 60 CAPSULE | Refills: 2 | Status: SHIPPED | OUTPATIENT
Start: 2022-08-16

## 2022-08-16 NOTE — TELEPHONE ENCOUNTER
Patient called , she is inquiring about her promethazine-codeine . Patient states that  she is taking this 3 times daily. She said it was supposed to be sent yesterday. Please advise.

## 2022-08-16 NOTE — TELEPHONE ENCOUNTER
Patient recently had this medication filled on August 1 2022  Per PDMP, this medication should be lasting her atleast 23 days  I will not be refilling this medication until at least next week     Patient was already advised of this during her clinic visit yesterday    Tracy Solis MD  8/16/2022  1:11 PM

## 2022-08-16 NOTE — TELEPHONE ENCOUNTER
Patient called in and I told her that she should still have some cough medication, she states that she does not and would like to speak with you.   Can you please give her a call back    Thank you

## 2022-08-25 ENCOUNTER — OFFICE VISIT (OUTPATIENT)
Dept: OBGYN | Age: 67
End: 2022-08-25
Payer: MEDICARE

## 2022-08-25 VITALS — BODY MASS INDEX: 31.83 KG/M2 | WEIGHT: 163 LBS

## 2022-08-25 DIAGNOSIS — R87.810 ASCUS WITH POSITIVE HIGH RISK HPV CERVICAL: ICD-10-CM

## 2022-08-25 DIAGNOSIS — R87.610 ASCUS WITH POSITIVE HIGH RISK HPV CERVICAL: ICD-10-CM

## 2022-08-25 DIAGNOSIS — Z12.31 ENCOUNTER FOR SCREENING MAMMOGRAM FOR BREAST CANCER: ICD-10-CM

## 2022-08-25 DIAGNOSIS — D07.2 VAIN III (VAGINAL INTRAEPITHELIAL NEOPLASIA III): Primary | ICD-10-CM

## 2022-08-25 DIAGNOSIS — Z01.419 ENCOUNTER FOR WELL WOMAN EXAM: ICD-10-CM

## 2022-08-25 PROCEDURE — 99213 OFFICE O/P EST LOW 20 MIN: CPT | Performed by: OBSTETRICS & GYNECOLOGY

## 2022-08-25 PROCEDURE — 99397 PER PM REEVAL EST PAT 65+ YR: CPT | Performed by: OBSTETRICS & GYNECOLOGY

## 2022-08-25 NOTE — PROGRESS NOTES
Roberth Talavera     Patient presents for annual exam/ pap smear. Patient had a pap smear last year that showed ASCUS. 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 Patient saw Dr. Mell Toledo who did excision. 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 She also had a D&C due to PMB that showed benign endometrium. Patient presents today for repeat pap smear.  She is due for a mammogram.     Past Medical History:   Diagnosis Date    GUANAKO-katerine [facility])     follows with Dr Karen Munson to transfer from chair to wheelchair     with assistance    Abnormal mammogram 2010    Acute on chronic respiratory failure ([address] 05/05/2017    Anemia     Anemia due to chronic illness     Ankle fracture, left 2008    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[address] 03/26/2007    Backache     Benign hypertension     CHF (congestive heart failure) (HCC)     Chronic back pain     Chronic kidney disease     stage 3    Chronic pain disorder     Chronic, continuous use of opioids     Compression fracture of thoracic vertebra (HCC)     T10    COPD (chronic obstructive pulmonary disease) ([address]     Debility     Deformity of ankle and foot, acquired 01/31/2011    Depression     Diabetes insipidus ([address]     Diverticulitis     Dry eyes     Encephalopathy acute 05/05/2017    Gait disturbance     GERD (gastroesophageal reflux disease)     Hemorrhoids 01/13/2012    Hernia     History of blood transfusion approx 05/2017    History of Clostridium difficile     negative culture 12-15-15; resolved    Hyperlipidemia     Hypothyroidism     Ischemic colitis, enteritis, or enterocolitis ([address]     Long term (current) use of systemic steroids 7/10/2022    Long-term current use of steroids     Lumbar disc herniation     Myalgia and myositis, unspecified     Nephrosclerosis     Nonunion of fracture 02/22/2011    Osteoarthritis     severe    PAF (paroxysmal atrial fibrillation) ([address]     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[facility]     PCWP mean:26.0 PA mean: 24.00; denies any recent issues    Pulmonary hypertension (HCC)     ventricular diastolic function consistent with abnormal relaxation (stage I)    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, f/u w/ PCP    Rectal bleeding     Rhabdomyolysis 05/09/2011    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  fx T10    LUMBAR DISC SURGERY      OTHER SURGICAL HISTORY  11/1/11    removal r leg external fixator    OTHER SURGICAL HISTORY  12/14/2021    Partial Vaginectomy, Endometrial Biopsy    SIGMOIDOSCOPY N/A 3/19/2021    SIGMOIDOSCOPY HEMORRHOID BANDING performed by Eugenio Arechiga MD at [address] [place] [address] / [facility]      application TSF  7/6/93    UPPER GASTROINTESTINAL ENDOSCOPY  1/4/2016    UPPER GASTROINTESTINAL ENDOSCOPY  07/26/2017        Family History   Problem Relation Age of Onset    Diabetes Mother     Arthritis Mother     High Blood Pressure Mother     Arthritis Father     High Blood Pressure Father     Diabetes Father     High Blood Pressure Sister     Alcohol Abuse Sister     Substance Abuse Brother     Substance Abuse Sister     Coronary Art Dis Neg Hx     Breast Cancer Neg Hx     Ovarian Cancer Neg Hx           Current Outpatient Medications:     oxyCODONE-acetaminophen (PERCOCET) 5-325 MG per tablet, Take 1 tablet by mouth every 4 hours as needed for Pain (max: Shortness of Breath, Disp: 1 each, Rfl: 5    BREO ELLIPTA 100-25 MCG/INH AEPB inhaler, Inhale 1 puff into the lungs daily, Disp: 28 each, Rfl: 5    Omega 3 1000 MG CAPS, Take 1 each by mouth daily, Disp: 90 capsule, Rfl: 1    albuterol (PROVENTIL) (2.5 MG/3ML) 0.083% nebulizer solution, Take 3 mLs by nebulization every 6 hours as needed for Wheezing or Shortness of Breath, Disp: 120 each, Rfl: 2    naloxone 4 MG/0.1ML LIQD nasal spray, 1 spray by Nasal route as needed for Opioid Reversal, Disp: 1 each, Rfl: 5    apixaban (ELIQUIS) 5 MG TABS tablet, Take 1 tablet by mouth 2 times daily, Disp: 60 tablet, Rfl: 5    mupirocin (BACTROBAN) 2 % ointment, Apply topically daily, Disp: 60 g, Rfl: 2    sodium chloride (ALTAMIST SPRAY) 0.65 % nasal spray, 1 spray by Nasal route as needed for Congestion, Disp: 1 Bottle, Rfl: 3    escitalopram (LEXAPRO) 10 MG tablet, Take 1 tablet by mouth 2 times daily, Disp: 60 tablet, Rfl: 2    famotidine (PEPCID) 20 MG tablet, Take 1 tablet by mouth 2 times daily, Disp: 60 tablet, Rfl: 2     No Known Allergies     Social History       Tobacco History       Smoking Status  Former Smoking Start Date  1971 Quit Date  9/10/1996 Smoking Frequency  0.75 packs/day for 25.00 years (18.75 pk-yrs)    Smoking Tobacco Type  Cigarettes from 1971 to 9/10/1996      Smokeless Tobacco Use  Never      Tobacco Comments  Patient quit smoking 26 yrs.ago. Alcohol History       Alcohol Use Status  Never              Drug Use       Drug Use Status  Yes Comment  1996 coccaine              Sexual Activity       Sexually Active  Not Currently                     There were no vitals filed for this visit. Physical Exam:  General: alert, short of breath with movement and lying flat     Breasts: deferred     Pelvic exam: normal external genitalia, vulva, vagina, cervix, uterus and adnexa. No uterine or adnexal masses or tenderness.   Exam very limited due to patient's immobility and shortness of breath with lying back     Ramonita Lynch was seen today for gynecologic exam.    Diagnoses and all orders for this visit:    VAIN III (vaginal intraepithelial neoplasia III)  -     PAP SMEAR    ASCUS with positive high risk HPV cervical  -     PAP SMEAR    Encounter for well woman exam    Encounter for screening mammogram for breast cancer  -     [facility] MARCELLO DIGITAL SCREEN BILATERAL; Future  Will notify if results are positive. Return in about 1 year (around 8/25/2023).      Martha Smith MD

## 2022-08-25 NOTE — PROGRESS NOTES
Patient alert and pleasant with no complaints  Patient arrived via wheelchair and on O2 2 liters   Patient did answer questions.  did assist with some of the questions asked of patient    assisted patient to exam table  Here today with   for annual GYN exam  Pelvic exam, pap smear obtained, labeled  and delivered to lab. Discharge instructions have been discussed with the patient. Patient advised to call our office with any questions or concerns. Voiced understanding.

## 2022-08-29 ENCOUNTER — TELEPHONE (OUTPATIENT)
Dept: FAMILY MEDICINE CLINIC | Age: 67
End: 2022-08-29

## 2022-08-29 DIAGNOSIS — J44.9 COPD, MODERATE (HCC): ICD-10-CM

## 2022-08-29 RX ORDER — FLUTICASONE FUROATE AND VILANTEROL TRIFENATATE 100; 25 UG/1; UG/1
1 POWDER RESPIRATORY (INHALATION) DAILY
Qty: 28 EACH | Refills: 5 | Status: CANCELLED | OUTPATIENT
Start: 2022-08-29

## 2022-08-29 RX ORDER — PROMETHAZINE HYDROCHLORIDE AND CODEINE PHOSPHATE 6.25; 1 MG/5ML; MG/5ML
5 SYRUP ORAL 4 TIMES DAILY PRN
Qty: 473 ML | Refills: 1 | Status: SHIPPED | OUTPATIENT
Start: 2022-08-29 | End: 2022-10-28

## 2022-08-29 NOTE — TELEPHONE ENCOUNTER
Last Appointment:  8/15/2022  Future Appointments   Date Time Provider Mauri Nicolei   8/30/2022  1:20 PM Karri Sandoval MD [facility]   9/14/2022  1:40 PM Ulices Arriaga MD [facility]   9/27/2022 10:00 PM [facility]EP [facility]

## 2022-08-29 NOTE — TELEPHONE ENCOUNTER
Esther Horowitz called in and is asking if you could please refill her cough medicine. She has been coughing quit a bit.     Please advise    Thank you

## 2022-08-30 ENCOUNTER — OFFICE VISIT (OUTPATIENT)
Dept: CARDIOLOGY CLINIC | Age: 67
End: 2022-08-30
Payer: MEDICARE

## 2022-08-30 VITALS
HEART RATE: 80 BPM | RESPIRATION RATE: 18 BRPM | WEIGHT: 156.7 LBS | HEIGHT: 60 IN | BODY MASS INDEX: 30.77 KG/M2 | OXYGEN SATURATION: 90 % | SYSTOLIC BLOOD PRESSURE: 80 MMHG | DIASTOLIC BLOOD PRESSURE: 60 MMHG

## 2022-08-30 DIAGNOSIS — I48.0 PAF (PAROXYSMAL ATRIAL FIBRILLATION) (HCC): Primary | ICD-10-CM

## 2022-08-30 LAB
HPV SAMPLE: NORMAL
HPV TYPE 16: NOT DETECTED
HPV TYPE 18: NOT DETECTED
HPV, HIGH RISK OTHER: NOT DETECTED
INTERPRETATION: NORMAL
SOURCE: NORMAL

## 2022-08-30 PROCEDURE — 1124F ACP DISCUSS-NO DSCNMKR DOCD: CPT | Performed by: INTERNAL MEDICINE

## 2022-08-30 PROCEDURE — G8399 PT W/DXA RESULTS DOCUMENT: HCPCS | Performed by: INTERNAL MEDICINE

## 2022-08-30 PROCEDURE — G8417 CALC BMI ABV UP PARAM F/U: HCPCS | Performed by: INTERNAL MEDICINE

## 2022-08-30 PROCEDURE — 93000 ELECTROCARDIOGRAM COMPLETE: CPT | Performed by: INTERNAL MEDICINE

## 2022-08-30 PROCEDURE — 1090F PRES/ABSN URINE INCON ASSESS: CPT | Performed by: INTERNAL MEDICINE

## 2022-08-30 PROCEDURE — 99214 OFFICE O/P EST MOD 30 MIN: CPT | Performed by: INTERNAL MEDICINE

## 2022-08-30 PROCEDURE — 3017F COLORECTAL CA SCREEN DOC REV: CPT | Performed by: INTERNAL MEDICINE

## 2022-08-30 PROCEDURE — G8427 DOCREV CUR MEDS BY ELIG CLIN: HCPCS | Performed by: INTERNAL MEDICINE

## 2022-08-30 PROCEDURE — 1036F TOBACCO NON-USER: CPT | Performed by: INTERNAL MEDICINE

## 2022-08-30 NOTE — PATIENT INSTRUCTIONS
Advised to stop amlodipine due to soft BP. Monitor BP and call me in AM  Continue Cardizem 30 mg po 3 times a day and Toprol 150 mg po daily  She was advised to keep well-hydrated and recommended to drink at least 2 glasses of water after going home. Monitor blood pressure and call me with the blood pressure readings in a.m. No anticoagulation therapy due to recurrent episodes of bleeding. Patient was advised to consider watchman device for stroke prevention which she is going to think about it. Continue Lasix 40 mg 3 times a week  Continue rest of the current medications  Follow-up with me in 3 months.

## 2022-08-30 NOTE — PROGRESS NOTES
OUTPATIENT CARDIOLOGY FOLLOW-UP    Name: Alexandr Prieto    Age: 73 y.o. Primary Care Physician: Omer Hensley MD    Date of Service: 8/30/2022    Chief Complaint:   Chief Complaint   Patient presents with    Atrial [address] Follow Up-        Interim History:   Mrs. Main Fall is a 78-year-old -American female with history of severe obesity, pulmonary sarcoidosis no evidence of cardiac sarcoidosis based on cardiac MRI, permanent atrial fibrillation on Eliquis for anticoagulation, chronic hypoxic respiratory failure on O2, hypertension, CKD, COPD, hypothyroidism and HRT, history of GI bleeding diabetes insipidus, pulmonary embolism presented to the emergency room on 7/14/2022 with increased shortness of breath and chest pressure. She has been having dyspnea with exertion off and on for the past several days. Previously, she was following with Dr. Ivory Kraus for her cardiology needs. She was seen as a new consult by me on 7/15/2022 and was diagnosed with permanent atrial fibrillation with RVR, acute on chronic HFpEF and chest pressure. She was given IV diuretics, echocardiogram was obtained and for rate control started on Cardizem and Toprol. Amlodipine was discontinued due to low blood pressures. Patient was discharged home on 7/18/2022. Since she was discharged in the hospital she has not had any further hospitalizations or ER visits. She is here for follow-up visit. Now, she is taking amlodipine 5 mg po daily and she is not sure who started back on that medication. She  is compliant with medications, as well as salt and fluid intake. She does not take any over-the-counter arthritis medications. She has constant pain over the left inframammary area and she has shingles in the same area 3 years back. She was stopped taking Eliquis one year. Has headche for a long time. No cardiac complaints.   She told me that she is not taking Eliquis any more due bleeding from multiple sites including nose,  rectum , vagina and mouth. No new cardiac complaints since last cardiology evaluation. She denies recent chest pain, SOB, palpitations, lightheadedness, dizziness, syncope, PND, or orthopnea. SR on EKG.     Review of Systems:   Cardiac: As per HPI  General: No fever, chills  Pulmonary: As per HPI  HEENT: No visual disturbances, difficult swallowing  GI: No nausea, vomiting  Endocrine: No thyroid disease or DM  Musculoskeletal: LESLIE x 4, no focal motor deficits  Skin: Intact, no rashes  Neuro/Psych: No headache or seizures    Past Medical History:  Past Medical History:   Diagnosis Date    A-fib [facility])     follows with Dr Inocente Johnson to transfer from chair to wheelchair     with assistance    Abnormal mammogram 2010    Acute on chronic respiratory failure ([address] 05/05/2017    Anemia     Anemia due to chronic illness     Ankle fracture, left 2008    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[address] 03/26/2007    Backache     Benign hypertension     CHF (congestive heart failure) ([facility])     Chronic back pain     Chronic kidney disease     stage 3    Chronic pain disorder     Chronic, continuous use of opioids     Compression fracture of thoracic vertebra (HCC)     T10    COPD (chronic obstructive pulmonary disease) ([address]     Debility     Deformity of ankle and foot, acquired 01/31/2011    Depression     Diabetes insipidus ([address]     Diverticulitis     Dry eyes     Encephalopathy acute 05/05/2017    Gait disturbance     GERD (gastroesophageal reflux disease)     Hemorrhoids 01/13/2012    Hernia     History of blood transfusion approx 05/2017    History of Clostridium difficile     negative culture 12-15-15; resolved    Hyperlipidemia     Hypothyroidism     Ischemic colitis, enteritis, or enterocolitis ([address]     Long term (current) use of systemic steroids 7/10/2022    Long-term current use of steroids     Lumbar disc herniation     Myalgia and myositis, unspecified     Nephrosclerosis     Nonunion of fracture History:  Social History     Socioeconomic History    Marital status:      Spouse name: Not on file    Number of children: Not on file    Years of education: Not on file    Highest education level: Not on file   Occupational History    Occupation: disability -[employer]   Tobacco Use    Smoking status: Former     Packs/day: 0.75     Years: 25.00     Pack years: 18.75     Types: Cigarettes     Start date:      Quit date: 9/10/1996     Years since quittin.9    Smokeless tobacco: Never    Tobacco comments:     Patient quit smoking 26 yrs.ago. Vaping Use    Vaping Use: Never used    Passive vaping exposure: Yes   Substance and Sexual Activity    Alcohol use: Never     Alcohol/week: 0.0 standard drinks    Drug use: Yes     Comment:  coccaine    Sexual activity: Not Currently   Other Topics Concern    Not on file   Social History Narrative    1 child, 2 step children,  for 20 years. Social Determinants of Health     Financial Resource Strain: Low Risk     Difficulty of Paying Living Expenses: Not hard at all   Food Insecurity: No Food Insecurity    Worried About Running Out of Food in the Last Year: Never true    Ran Out of Food in the Last Year: Never true   Transportation Needs: Not on file   Physical Activity: Inactive    Days of Exercise per Week: 0 days    Minutes of Exercise per Session: 0 min   Stress: Not on file   Social Connections: Not on file   Intimate Partner Violence: Not on file   Housing Stability: Not on file       Allergies:  No Known Allergies    Current Medications:  Current Outpatient Medications   Medication Sig Dispense Refill    promethazine-codeine (PHENERGAN WITH CODEINE) 6.25-10 MG/5ML syrup Take 5 mLs by mouth 4 times daily as needed for Cough for up to 60 days. 473 mL 1    oxyCODONE-acetaminophen (PERCOCET) 5-325 MG per tablet Take 1 tablet by mouth every 4 hours as needed for Pain (max: 150/month) for up to 30 days.  150 tablet 0    hydrocortisone 2.5 % cream Apply topically 2 times daily. 45 g 0    omega-3 acid ethyl esters (LOVAZA) 1 g capsule Take 1 capsule by mouth in the morning and 1 capsule before bedtime. 60 capsule 2    OXYGEN Inhale 2 L/min into the lungs as needed (shortness of breath, low oxygen <90%) BMS 1 Canister 3    furosemide (LASIX) 40 MG tablet Take 1 tablet by mouth in the morning. 60 tablet 3    dilTIAZem (CARDIZEM) 30 MG tablet Take 1 tablet by mouth in the morning and 1 tablet at noon and 1 tablet before bedtime.  120 tablet 3    metoprolol succinate (TOPROL XL) 100 MG extended release tablet Take 1 tablet by mouth daily 90 tablet 3    metoprolol succinate (TOPROL XL) 50 MG extended release tablet TAKE ONE TABLET EVERY DAY WITH THE 100MG 90 tablet 3    levothyroxine (SYNTHROID) 75 MCG tablet Take 1 tablet by mouth daily 30 tablet 5    escitalopram (LEXAPRO) 10 MG tablet Take 1 tablet by mouth 2 times daily 60 tablet 2    famotidine (PEPCID) 20 MG tablet Take 1 tablet by mouth 2 times daily 60 tablet 2    ferrous sulfate (IRON 325) 325 (65 Fe) MG tablet Take 1 tablet by mouth 2 times daily 60 tablet 5    amLODIPine (NORVASC) 5 MG tablet Take 1 tablet by mouth nightly 30 tablet 5    predniSONE (DELTASONE) 5 MG tablet Take 1 tablet by mouth daily 30 tablet 5    cycloSPORINE (RESTASIS) 0.05 % ophthalmic emulsion Place 1 drop into both eyes every 12 hours 5.5 mL 1    desmopressin (DDAVP) 0.1 mg tablet Take 2 tablets by mouth 2 times daily 120 tablet 2    docusate sodium (COLACE) 100 mg capsule Take 1 capsule by mouth 2 times daily 60 capsule 3    albuterol sulfate HFA (PROVENTIL HFA) 108 (90 Base) MCG/ACT inhaler Inhale 2 puffs into the lungs every 6 hours as needed for Wheezing or Shortness of Breath 1 each 5    BREO ELLIPTA 100-25 MCG/INH AEPB inhaler Inhale 1 puff into the lungs daily 28 each 5    Omega 3 1000 MG CAPS Take 1 each by mouth daily 90 capsule 1    albuterol (PROVENTIL) (2.5 MG/3ML) 0.083% nebulizer solution Take 3 mLs by nebulization every 6 hours as needed for Wheezing or Shortness of Breath 120 each 2    naloxone 4 MG/0.1ML LIQD nasal spray 1 spray by Nasal route as needed for Opioid Reversal 1 each 5    apixaban (ELIQUIS) 5 MG TABS tablet Take 1 tablet by mouth 2 times daily 60 tablet 5    mupirocin (BACTROBAN) 2 % ointment Apply topically daily 60 g 2    sodium chloride (ALTAMIST SPRAY) 0.65 % nasal spray 1 spray by Nasal route as needed for Congestion 1 Bottle 3     No current facility-administered medications for this visit. Physical Exam:  LMP  (LMP Unknown)   Wt Readings from Last 3 Encounters:   08/25/22 163 lb (73.9 kg)   08/15/22 163 lb 4.8 oz (74.1 kg)   08/11/22 180 lb (81.6 kg)     Appearance: Awake, alert and oriented x 3, no acute respiratory distress  Skin: Intact, no rash  Head: Normocephalic, atraumatic  Eyes: EOMI, no conjunctival erythema  ENMT: No pharyngeal erythema, MMM, no rhinorrhea  Neck: Supple, no elevated JVP, no carotid bruits  Lungs: Clear to auscultation bilaterally. No wheezes, rales, or rhonchi.   Cardiac: Irregularly irregular rate and rhythm, +S1S2, no murmurs apparent  Abdomen: Soft, nontender, +bowel sounds  Extremities: Moves all extremities x 4, no lower extremity edema  Neurologic: No focal motor deficits apparent, normal mood and affect, alert and oriented x 3  Peripheral Pulses: Intact posterior tibial pulses bilaterally    Laboratory Tests:  Lab Results   Component Value Date    CREATININE 1.1 (H) 07/18/2022    BUN 19 07/18/2022     07/18/2022    K 3.0 (L) 07/18/2022    CL 92 (L) 07/18/2022    CO2 42 (HH) 07/18/2022     Lab Results   Component Value Date/Time    MG 1.8 07/11/2022 05:18 AM     Lab Results   Component Value Date    WBC 4.4 (L) 07/14/2022    HGB 8.9 (L) 07/14/2022    HCT 30.6 (L) 07/14/2022    MCV 89.2 07/14/2022     07/14/2022     Lab Results   Component Value Date    ALT 31 07/18/2022    AST 20 07/18/2022    ALKPHOS 107 (H) 07/18/2022    BILITOT 0.3 07/18/2022     Lab Results   Component Value Date    CKTOTAL 64 08/23/2018    CKMB 2.2 08/24/2017    TROPONINI <0.01 11/11/2019    TROPONINI <0.01 08/12/2019    TROPONINI <0.01 08/23/2018     Lab Results   Component Value Date    INR 1.0 03/11/2022    INR 1.2 03/01/2022    INR 1.5 02/08/2018    PROTIME 11.0 03/11/2022    PROTIME 12.8 (H) 03/01/2022    PROTIME 17.1 (H) 02/08/2018     Lab Results   Component Value Date    TSH 0.041 (L) 07/16/2022     Lab Results   Component Value Date    LABA1C 5.3 11/20/2013     No results found for: EAG  Lab Results   Component Value Date    CHOL 229 (H) 11/04/2021    CHOL 199 09/26/2016    CHOL 239 (H) 06/09/2016     Lab Results   Component Value Date    TRIG 70 11/04/2021    TRIG 110 09/26/2016    TRIG 168 (H) 06/09/2016     Lab Results   Component Value Date    HDL 88 11/04/2021    HDL 76 09/26/2016    HDL 93 06/09/2016     Lab Results   Component Value Date    LDLCALC 127 (H) 11/04/2021    LDLCALC 101 (H) 09/26/2016    LDLCALC 112 (H) 06/09/2016     Lab Results   Component Value Date    LABVLDL 14 11/04/2021    LABVLDL 22 09/26/2016    LABVLDL 34 06/09/2016     No results found for: CHOLHDLRATIO    Cardiac Tests:  ECG:AF, NSST, abnormal EKG. Echo, 01/16/2017, EF normal.  Stage 2 diastolic dysfunction. Mild LA dilatation. Mild MR and TR. TR velocity 4.56 m/s. Consistent with severe pulmonary hypertension  2d e cho 6/16/20 EF 60   Study completed for sarcoidosis and pulmonary hypertension. There is evidence for pulmonary hypertension. There is no evidence for cardiac sarcoidosis on this study. Concentric remodeling. Normal left ventricular systolic function. No wall motion abnormalities or thinning in a non coronary distribution   that would be consistent with cardiac sarcoidosis. Normal diastolic function. Normal left atrial pressure. Mild right ventricular systolic dysfunction.    TAPSE is 1.2 cm, TDI s' is 9 cm/s, and the RV free wall is mildly dysfunctional.   RVSP is 69 mm Hg. Mildly redundant mitral valve leaflets. Mild mitral regurgitation. Mildly redundant tricuspid valve leaflets. Mild to moderate tricuspid valve. Central regurgitant jet. Small inferior pericardial effusion. No tamponade physiology. Echocardiogram - 7/14/22:   Ejection fraction is visually estimated at 60%. No regional wall motion abnormalities seen. Moderate left ventricular concentric hypertrophy noted. Dilated right ventricle with reduced function. Left atrial volume index of 34 ml per meters squared BSA. Moderate mitral regurgitation is present. Moderate tricuspid regurgitation. Pulmonary hypertension is severe. RVSP is 70 mmHg. No evidence for hemodynamically significant pericardial effusion. Stress test:          Cardiac catheterization:     The 10-year ASCVD risk score (Jhon Cody, et al., 2013) is: 8.6%    Values used to calculate the score:      Age: 73 years      Sex: Female      Is Non- : Yes      Diabetic: No      Tobacco smoker: No      Systolic Blood Pressure: 874 mmHg      Is BP treated: Yes      HDL Cholesterol: 88 mg/dL      Total Cholesterol: 229 mg/dL        ASSESSMENT:  Hypotension, not symptomatic  Permanent atrial fibrillation with RVR diagnosed in 2016  CHADS2 Vascor 4 on Eliquis for anticoagulation  Chronic HFpEF  Pulmonary sarcoidosis without evidence of cardiac sarcoidosis based on cardiac MRI done in 2016, on prednisone  Hypothyroidism HRT  CKD  Hypertension, stable  History of GI bleed, hemoglobin low 8.9  History of pulmonary embolism  Moderate pulm hypertension appears multifactorial including WHO group 3, 2 and possibly 4  History of diabetes insipidus on desmopressin    Plan:   Advised to stop amlodipine due to soft BP.  Monitor BP and call me in AM  Continue Cardizem 30 mg po 3 times a day and Toprol 150 mg po daily  She was advised to keep well-hydrated and recommended to drink at least 2 glasses of water after going home. Monitor blood pressure and call me with the blood pressure readings in a.m. No anticoagulation therapy due to recurrent episodes of bleeding. Patient was advised to consider watchman device for stroke prevention which she is going to think about it. Continue Lasix 40 mg 3 times a week  Continue rest of the current medications  Follow-up with me in 3 months. The patient's current medication list, allergies, problem list and results of all previously ordered testing were reviewed at today's visit.   Fred Alejo MD  [facility]) Cardiology

## 2022-09-10 ENCOUNTER — APPOINTMENT (OUTPATIENT)
Dept: CT IMAGING | Age: 67
End: 2022-09-10
Payer: MEDICARE

## 2022-09-10 ENCOUNTER — HOSPITAL ENCOUNTER (EMERGENCY)
Age: 67
Discharge: HOME | End: 2022-09-10
Attending: EMERGENCY MEDICINE
Payer: MEDICARE

## 2022-09-10 VITALS
HEIGHT: 60 IN | OXYGEN SATURATION: 98 % | TEMPERATURE: 98.7 F | DIASTOLIC BLOOD PRESSURE: 99 MMHG | HEART RATE: 90 BPM | RESPIRATION RATE: 24 BRPM | WEIGHT: 165 LBS | BODY MASS INDEX: 32.39 KG/M2 | SYSTOLIC BLOOD PRESSURE: 128 MMHG

## 2022-09-10 DIAGNOSIS — K42.9 UMBILICAL HERNIA WITHOUT OBSTRUCTION AND WITHOUT GANGRENE: Primary | ICD-10-CM

## 2022-09-10 LAB
ALBUMIN SERPL-MCNC: 3.4 G/DL (ref 3.5–5.2)
ALP BLD-CCNC: 71 U/L (ref 35–104)
ALT SERPL-CCNC: 9 U/L (ref 0–32)
ANION GAP SERPL CALCULATED.3IONS-SCNC: 6 MMOL/L (ref 7–16)
ANISOCYTOSIS: ABNORMAL
AST SERPL-CCNC: 18 U/L (ref 0–31)
BASOPHILS ABSOLUTE: 0 E9/L (ref 0–0.2)
BASOPHILS RELATIVE PERCENT: 0 % (ref 0–2)
BILIRUB SERPL-MCNC: 0.4 MG/DL (ref 0–1.2)
BUN BLDV-MCNC: 10 MG/DL (ref 6–23)
CALCIUM SERPL-MCNC: 9.2 MG/DL (ref 8.6–10.2)
CHLORIDE BLD-SCNC: 99 MMOL/L (ref 98–107)
CO2: 36 MMOL/L (ref 22–29)
CREAT SERPL-MCNC: 0.9 MG/DL (ref 0.5–1)
EOSINOPHILS ABSOLUTE: 0.33 E9/L (ref 0.05–0.5)
EOSINOPHILS RELATIVE PERCENT: 7.8 % (ref 0–6)
GFR AFRICAN AMERICAN: >60
GFR NON-AFRICAN AMERICAN: >60 ML/MIN/1.73
GLUCOSE BLD-MCNC: 98 MG/DL (ref 74–99)
HCT VFR BLD CALC: 36.1 % (ref 34–48)
HEMOGLOBIN: 10.7 G/DL (ref 11.5–15.5)
LYMPHOCYTES ABSOLUTE: 0.29 E9/L (ref 1.5–4)
LYMPHOCYTES RELATIVE PERCENT: 6.9 % (ref 20–42)
MAGNESIUM: 1.5 MG/DL (ref 1.6–2.6)
MCH RBC QN AUTO: 25.4 PG (ref 26–35)
MCHC RBC AUTO-ENTMCNC: 29.6 % (ref 32–34.5)
MCV RBC AUTO: 85.7 FL (ref 80–99.9)
MONOCYTES ABSOLUTE: 0.17 E9/L (ref 0.1–0.95)
MONOCYTES RELATIVE PERCENT: 3.5 % (ref 2–12)
NEUTROPHILS ABSOLUTE: 3.4 E9/L (ref 1.8–7.3)
NEUTROPHILS RELATIVE PERCENT: 80.9 % (ref 43–80)
NUCLEATED RED BLOOD CELLS: 0 /100 WBC
OVALOCYTES: ABNORMAL
PDW BLD-RTO: 15 FL (ref 11.5–15)
PLATELET # BLD: 125 E9/L (ref 130–450)
PMV BLD AUTO: 11.7 FL (ref 7–12)
POIKILOCYTES: ABNORMAL
POLYCHROMASIA: ABNORMAL
POTASSIUM REFLEX MAGNESIUM: 3.3 MMOL/L (ref 3.5–5)
PROMYELOCYTES PERCENT: 0.9 % (ref 0–0)
RBC # BLD: 4.21 E12/L (ref 3.5–5.5)
SODIUM BLD-SCNC: 141 MMOL/L (ref 132–146)
TEAR DROP CELLS: ABNORMAL
TOTAL PROTEIN: 6.1 G/DL (ref 6.4–8.3)
WBC # BLD: 4.2 E9/L (ref 4.5–11.5)

## 2022-09-10 PROCEDURE — 6360000002 HC RX W HCPCS: Performed by: EMERGENCY MEDICINE

## 2022-09-10 PROCEDURE — 99285 EMERGENCY DEPT VISIT HI MDM: CPT

## 2022-09-10 PROCEDURE — 83735 ASSAY OF MAGNESIUM: CPT

## 2022-09-10 PROCEDURE — 96376 TX/PRO/DX INJ SAME DRUG ADON: CPT

## 2022-09-10 PROCEDURE — 6370000000 HC RX 637 (ALT 250 FOR IP)

## 2022-09-10 PROCEDURE — 74177 CT ABD & PELVIS W/CONTRAST: CPT

## 2022-09-10 PROCEDURE — 80053 COMPREHEN METABOLIC PANEL: CPT

## 2022-09-10 PROCEDURE — 6360000002 HC RX W HCPCS

## 2022-09-10 PROCEDURE — 96375 TX/PRO/DX INJ NEW DRUG ADDON: CPT

## 2022-09-10 PROCEDURE — 96365 THER/PROPH/DIAG IV INF INIT: CPT

## 2022-09-10 PROCEDURE — 85025 COMPLETE CBC W/AUTO DIFF WBC: CPT

## 2022-09-10 PROCEDURE — 6360000004 HC RX CONTRAST MEDICATION: Performed by: RADIOLOGY

## 2022-09-10 RX ORDER — MORPHINE SULFATE 4 MG/ML
4 INJECTION, SOLUTION INTRAMUSCULAR; INTRAVENOUS ONCE
Status: COMPLETED | OUTPATIENT
Start: 2022-09-10 | End: 2022-09-10

## 2022-09-10 RX ORDER — MORPHINE SULFATE 8 MG/ML
5 INJECTION, SOLUTION INTRAMUSCULAR; INTRAVENOUS ONCE
Status: COMPLETED | OUTPATIENT
Start: 2022-09-10 | End: 2022-09-10

## 2022-09-10 RX ORDER — MAGNESIUM SULFATE IN WATER 40 MG/ML
2000 INJECTION, SOLUTION INTRAVENOUS ONCE
Status: COMPLETED | OUTPATIENT
Start: 2022-09-10 | End: 2022-09-10

## 2022-09-10 RX ORDER — FENTANYL CITRATE 50 UG/ML
25 INJECTION, SOLUTION INTRAMUSCULAR; INTRAVENOUS ONCE
Status: COMPLETED | OUTPATIENT
Start: 2022-09-10 | End: 2022-09-10

## 2022-09-10 RX ORDER — HYDROCODONE BITARTRATE AND ACETAMINOPHEN 5; 325 MG/1; MG/1
1 TABLET ORAL EVERY 6 HOURS PRN
Qty: 12 TABLET | Refills: 0 | Status: ON HOLD | OUTPATIENT
Start: 2022-09-10 | End: 2022-09-16 | Stop reason: HOSPADM

## 2022-09-10 RX ORDER — SODIUM CHLORIDE 0.9 % (FLUSH) 0.9 %
SYRINGE (ML) INJECTION
Status: DISCONTINUED
Start: 2022-09-10 | End: 2022-09-11 | Stop reason: HOSPADM

## 2022-09-10 RX ORDER — POTASSIUM CHLORIDE 20 MEQ/1
40 TABLET, EXTENDED RELEASE ORAL ONCE
Status: COMPLETED | OUTPATIENT
Start: 2022-09-10 | End: 2022-09-10

## 2022-09-10 RX ADMIN — POTASSIUM CHLORIDE 40 MEQ: 1500 TABLET, EXTENDED RELEASE ORAL at 14:26

## 2022-09-10 RX ADMIN — MORPHINE SULFATE 4 MG: 4 INJECTION, SOLUTION INTRAMUSCULAR; INTRAVENOUS at 14:31

## 2022-09-10 RX ADMIN — IOPAMIDOL 65 ML: 755 INJECTION, SOLUTION INTRAVENOUS at 17:14

## 2022-09-10 RX ADMIN — MAGNESIUM SULFATE HEPTAHYDRATE 2000 MG: 40 INJECTION, SOLUTION INTRAVENOUS at 15:42

## 2022-09-10 RX ADMIN — FENTANYL CITRATE 25 MCG: 50 INJECTION, SOLUTION INTRAMUSCULAR; INTRAVENOUS at 20:36

## 2022-09-10 RX ADMIN — MORPHINE SULFATE 5 MG: 8 INJECTION, SOLUTION INTRAMUSCULAR; INTRAVENOUS at 15:41

## 2022-09-10 ASSESSMENT — ENCOUNTER SYMPTOMS
EYE PAIN: 0
ABDOMINAL PAIN: 1
CHOKING: 0
PHOTOPHOBIA: 0
CONSTIPATION: 0
COUGH: 0
DIARRHEA: 0
SORE THROAT: 0
NAUSEA: 0
FACIAL SWELLING: 0
VOMITING: 0
BACK PAIN: 0
SHORTNESS OF BREATH: 0

## 2022-09-10 ASSESSMENT — PAIN SCALES - GENERAL
PAINLEVEL_OUTOF10: 9
PAINLEVEL_OUTOF10: 10
PAINLEVEL_OUTOF10: 0
PAINLEVEL_OUTOF10: 0
PAINLEVEL_OUTOF10: 1
PAINLEVEL_OUTOF10: 10
PAINLEVEL_OUTOF10: 7

## 2022-09-10 ASSESSMENT — PAIN - FUNCTIONAL ASSESSMENT
PAIN_FUNCTIONAL_ASSESSMENT: NONE - DENIES PAIN
PAIN_FUNCTIONAL_ASSESSMENT: 0-10

## 2022-09-10 ASSESSMENT — PAIN DESCRIPTION - LOCATION
LOCATION: ABDOMEN

## 2022-09-10 NOTE — ED PROVIDER NOTES
[facility]  Department of Emergency Medicine     Written by: Jessica Li MD  Patient Name: Trudy Carney  Attending Provider: Alberto Alvarado DO  Admit Date: 9/10/2022 12:31 PM  MRN: [id number]                   : 1955        Chief Complaint   Patient presents with    Abdominal Pain     Hx/ of hernia, states it has been hurting more recently    - Chief complaint    59-year-old female with known history of CKD, pulmonary sarcoidosis, GERD, diabetes insipidus, A. fib, CHF, A. fib, COPD, hyperlipidemia, ventral hernia presents the ED with complaints of abdominal pain. She has a known prior hernia repair with a chronic wound and has known chronic abdominal pain. The patient is chronically on 5 L of oxygen at home due to COPD. She states that her abdominal symptoms started since her ventral hernia repair years ago, however over the last 2 days she has had an episode of worsening pain. She states that nothing makes it better or worse, is not able to describe, without any radiating symptoms. Severity currently patient denies any. Timing is constant. The patient denies any fevers, nausea, vomiting, hematemesis, hematuria, hematochezia. She denies any chest pain. Patient has a nurse that redresses her wound weekly at home. Review of Systems   Constitutional:  Negative for appetite change, chills, diaphoresis and fever. HENT:  Negative for ear discharge, ear pain, facial swelling, sneezing and sore throat. Eyes:  Negative for photophobia and pain. Respiratory:  Negative for cough, choking and shortness of breath. Cardiovascular:  Negative for chest pain and palpitations. Gastrointestinal:  Positive for abdominal pain. Negative for constipation, diarrhea, nausea and vomiting. Genitourinary:  Negative for dysuria, enuresis, flank pain, frequency and hematuria. Musculoskeletal:  Negative for arthralgias, back pain, joint swelling, myalgias and neck pain. Skin:  Positive for wound (Chronic surgical wound, no signs of infection). Negative for pallor and rash. Neurological:  Negative for weakness, light-headedness and headaches. Physical Exam  Constitutional:       General: She is not in acute distress. Appearance: Normal appearance. She is not ill-appearing. HENT:      Head: Normocephalic and atraumatic. Nose: Nose normal. No congestion. Mouth/Throat:      Mouth: Mucous membranes are moist.      Pharynx: No posterior oropharyngeal erythema. Eyes:      General: No scleral icterus. Extraocular Movements: Extraocular movements intact. Pupils: Pupils are equal, round, and reactive to light. Cardiovascular:      Rate and Rhythm: Normal rate and regular rhythm. Pulses: Normal pulses. Heart sounds: Normal heart sounds. Pulmonary:      Effort: Pulmonary effort is normal.      Breath sounds: Normal breath sounds. Abdominal:      General: Abdomen is protuberant. A surgical scar is present. Bowel sounds are normal. There is distension (Of surgical site). Palpations: Abdomen is soft. There is no mass. Tenderness: There is abdominal tenderness in the periumbilical area. Musculoskeletal:         General: No swelling, tenderness or deformity. Normal range of motion. Cervical back: Normal range of motion. No rigidity or tenderness. Skin:     Capillary Refill: Capillary refill takes less than 2 seconds. Coloration: Skin is not jaundiced or pale. Findings: No erythema. Neurological:      General: No focal deficit present. Mental Status: She is alert and oriented to person, place, and time. Mental status is at baseline. Procedures       MDM  Number of Diagnoses or Management Options  Umbilical hernia without obstruction and without gangrene  Diagnosis management comments: This is a 61-year-old female presents to the ED with complaints of abdominal pain on her previous ventral hernia site.   The patient states that it is chronic in nature, but she has episodes of flareups of pain. She had a current episode of flareup for the past 3 days, and is seeking pain relief. The abdomen at the surgical site looks severely distended, and there is painful. The patient is currently hemodynamically stable, and is on 5 L of oxygen, which she is on at home. CBC, CMP, CT scan of abdomen ordered. Patient complaining of pain, blood pressure is 124/67. Morphine 4 mg ordered. Patient's potassium and magnesium are both low, 3.3 and 1.5 respectively. Potassium and magnesium ordered and will be administered. Amount and/or Complexity of Data Reviewed  Decide to obtain previous medical records or to obtain history from someone other than the patient: yes    ED Course as of 09/11/22 0606   Sat Sep 10, 2022   1526 . [TG]   26 ATTENDING PROVIDER ATTESTATION:     I have personally performed and/or participated in the history, exam, medical decision making, and procedures and agree with all pertinent clinical information unless otherwise noted. I have also reviewed and agree with the past medical, family and social history unless otherwise noted. I have discussed this patient in detail with the resident and provided the instruction and education regarding the evidence-based evaluation and treatment of abdominal pain. Any EKG that may have been performed has been personally reviewed by me and I agree with the documentation as noted by the resident. History: Patient presents with a long existing large ventral wall hernia. She relates that she was admitted in [facility] in June for this as well as other issues. She reports he has been no improvement in it. She presents today with continued pain. My findings: Rhea Jones is a 73 y.o. female whom is in no distress. Physical exam reveals she is alert and oriented. Heart is regular, lungs are decreased with expiratory wheeze and a mild nature.   Abdomen is soft.  There is a large ventral wall hernia that protrudes. In the central portion is a dressing. Removal of the dressing demonstrates near complete healing of a chronic abdominal wound that has been healing by secondary intention. No discharge or sign of cellulitis or other infection. The distended portion is very soft as well as the rest of the abdomen. My plan: Symptomatic and supportive care. CT, labs. Electronically signed by Rosana Camarillo DO on 9/10/22 at 3:27 PM EDT       [TG]   2022 Pt states her pain is so severe she is afraid to go home, plan is to give her more pain medication prior to discharge with a short course of pain medication until f/u with surgery. [CB]   2120 Pt appears comfortable in no distress in her bed, stating her pain is still an 8/10 despite multiple rounds of strong analgesics. Pt is stable for discharge.  [CB]      ED Course User Index  [CB] Richie Guo DO  [TG] Rosana Camarillo DO     Labs Reviewed   CBC WITH AUTO DIFFERENTIAL - Abnormal; Notable for the following components:       Result Value    WBC 4.2 (*)     Hemoglobin 10.7 (*)     MCH 25.4 (*)     MCHC 29.6 (*)     Platelets 046 (*)     Neutrophils % 80.9 (*)     Lymphocytes % 6.9 (*)     Eosinophils % 7.8 (*)     Lymphocytes Absolute 0.29 (*)     All other components within normal limits   COMPREHENSIVE METABOLIC PANEL W/ REFLEX TO MG FOR LOW K - Abnormal; Notable for the following components:    Potassium reflex Magnesium 3.3 (*)     CO2 36 (*)     Anion Gap 6 (*)     Total Protein 6.1 (*)     Albumin 3.4 (*)     All other components within normal limits   MAGNESIUM - Abnormal; Notable for the following components:    Magnesium 1.5 (*)     All other components within normal limits     CT ABDOMEN PELVIS W IV CONTRAST Additional Contrast? None   Final Result   1. Stable ventral abdominal wall hernia. 2. No bowel obstruction, free air, or free fluid. 3. Diverticulosis   4. Cholelithiasis   5.  Redemonstration of opacities in lung bases suggestive of subsegmental   atelectasis and chronic interstitial lung disease. Medications   iopamidol (ISOVUE-370) 76 % injection 65 mL (65 mLs IntraVENous Given 9/10/22 1714)   potassium chloride (KLOR-CON M) extended release tablet 40 mEq (40 mEq Oral Given 9/10/22 1426)   morphine sulfate (PF) injection 4 mg (4 mg IntraVENous Given 9/10/22 1431)   magnesium sulfate 2000 mg in 50 mL IVPB premix (0 mg IntraVENous Stopped 9/10/22 1653)   morphine sulfate (PF) injection 5 mg (5 mg IntraVENous Given 9/10/22 1541)   fentaNYL (SUBLIMAZE) injection 25 mcg (25 mcg IntraVENous Given 9/10/22 2036)     6:08 AM EDT  I have spoken with the patient and discussed todays results, in addition to providing specific details for the plan of care and counseling regarding the diagnosis and prognosis. Their questions are answered at this time and they are agreeable with the plan. I discussed at length with them reasons for immediate return here for re evaluation. They will followup with their and the on call primary care physician by calling their office tomorrow and on Monday.    --------------------------------- ADDITIONAL PROVIDER NOTES ---------------------------------  At this time the patient is without objective evidence of an acute process requiring hospitalization or inpatient management. They have remained hemodynamically stable throughout their entire ED visit and are stable for discharge with outpatient follow-up. The plan has been discussed in detail and they are aware of the specific conditions for emergent return, as well as the importance of follow-up. Discharge Medication List as of 9/10/2022  9:17 PM          Diagnosis:  1. Umbilical hernia without obstruction and without gangrene        Disposition:  Patient's disposition: Discharge to home  Patient's condition is stable. Patient was seen and evaluated by myself and my attending Liz Wisdom DO.  Assessment and Plan discussed with attending provider, please see attestation for final plan of care.      MD Fabby Arguello MD  Resident  09/11/22 [id number]

## 2022-09-10 NOTE — ED NOTES
[facility] care of [facility].        Kamini Goyal RN  09/10/22 [id number]

## 2022-09-12 ENCOUNTER — HOSPITAL ENCOUNTER (INPATIENT)
Age: 67
LOS: 15 days | Discharge: NURSING, REHAB OR LONG-TERM CARE | DRG: 682 | End: 2022-09-27
Attending: EMERGENCY MEDICINE | Admitting: FAMILY MEDICINE
Payer: MEDICARE

## 2022-09-12 DIAGNOSIS — N17.9 AKI (ACUTE KIDNEY INJURY) (HCC): ICD-10-CM

## 2022-09-12 DIAGNOSIS — N30.01 ACUTE CYSTITIS WITH HEMATURIA: Primary | ICD-10-CM

## 2022-09-12 DIAGNOSIS — K42.9 UMBILICAL HERNIA WITHOUT OBSTRUCTION AND WITHOUT GANGRENE: ICD-10-CM

## 2022-09-12 DIAGNOSIS — R10.84 GENERALIZED ABDOMINAL PAIN: ICD-10-CM

## 2022-09-12 PROBLEM — N39.0 UTI (URINARY TRACT INFECTION): Status: ACTIVE | Noted: 2022-09-12

## 2022-09-12 LAB
ALBUMIN SERPL-MCNC: 4 G/DL (ref 3.5–5.2)
ALP BLD-CCNC: 73 U/L (ref 35–104)
ALT SERPL-CCNC: 9 U/L (ref 0–32)
ANION GAP SERPL CALCULATED.3IONS-SCNC: 14 MMOL/L (ref 7–16)
ANISOCYTOSIS: ABNORMAL
AST SERPL-CCNC: 20 U/L (ref 0–31)
BACTERIA: ABNORMAL /HPF
BASOPHILS ABSOLUTE: 0.02 E9/L (ref 0–0.2)
BASOPHILS RELATIVE PERCENT: 0.3 % (ref 0–2)
BILIRUB SERPL-MCNC: 0.4 MG/DL (ref 0–1.2)
BILIRUBIN URINE: NEGATIVE
BLOOD, URINE: ABNORMAL
BUN BLDV-MCNC: 20 MG/DL (ref 6–23)
CALCIUM SERPL-MCNC: 9.2 MG/DL (ref 8.6–10.2)
CHLORIDE BLD-SCNC: 94 MMOL/L (ref 98–107)
CLARITY: ABNORMAL
CO2: 30 MMOL/L (ref 22–29)
COLOR: YELLOW
CREAT SERPL-MCNC: 2 MG/DL (ref 0.5–1)
EOSINOPHILS ABSOLUTE: 0.02 E9/L (ref 0.05–0.5)
EOSINOPHILS RELATIVE PERCENT: 0.3 % (ref 0–6)
EPITHELIAL CELLS, UA: ABNORMAL /HPF
GFR AFRICAN AMERICAN: 30
GFR NON-AFRICAN AMERICAN: 30 ML/MIN/1.73
GLUCOSE BLD-MCNC: 101 MG/DL (ref 74–99)
GLUCOSE URINE: NEGATIVE MG/DL
HCT VFR BLD CALC: 33.7 % (ref 34–48)
HEMOGLOBIN: 10.1 G/DL (ref 11.5–15.5)
HYPOCHROMIA: ABNORMAL
IMMATURE GRANULOCYTES #: 0.01 E9/L
IMMATURE GRANULOCYTES %: 0.2 % (ref 0–5)
KETONES, URINE: ABNORMAL MG/DL
LACTIC ACID: 1.5 MMOL/L (ref 0.5–2.2)
LEUKOCYTE ESTERASE, URINE: ABNORMAL
LIPASE: 13 U/L (ref 13–60)
LYMPHOCYTES ABSOLUTE: 0.49 E9/L (ref 1.5–4)
LYMPHOCYTES RELATIVE PERCENT: 7.8 % (ref 20–42)
MCH RBC QN AUTO: 25.1 PG (ref 26–35)
MCHC RBC AUTO-ENTMCNC: 30 % (ref 32–34.5)
MCV RBC AUTO: 83.6 FL (ref 80–99.9)
MONOCYTES ABSOLUTE: 0.47 E9/L (ref 0.1–0.95)
MONOCYTES RELATIVE PERCENT: 7.5 % (ref 2–12)
NEUTROPHILS ABSOLUTE: 5.26 E9/L (ref 1.8–7.3)
NEUTROPHILS RELATIVE PERCENT: 83.9 % (ref 43–80)
NITRITE, URINE: NEGATIVE
OVALOCYTES: ABNORMAL
PDW BLD-RTO: 14.7 FL (ref 11.5–15)
PH UA: 5.5 (ref 5–9)
PLATELET # BLD: 135 E9/L (ref 130–450)
PMV BLD AUTO: 10.7 FL (ref 7–12)
POIKILOCYTES: ABNORMAL
POLYCHROMASIA: ABNORMAL
POTASSIUM REFLEX MAGNESIUM: 4.5 MMOL/L (ref 3.5–5)
PROTEIN UA: 100 MG/DL
RBC # BLD: 4.03 E12/L (ref 3.5–5.5)
RBC UA: ABNORMAL /HPF (ref 0–2)
SODIUM BLD-SCNC: 138 MMOL/L (ref 132–146)
SPECIFIC GRAVITY UA: 1.01 (ref 1–1.03)
TEAR DROP CELLS: ABNORMAL
TOTAL PROTEIN: 6.9 G/DL (ref 6.4–8.3)
UROBILINOGEN, URINE: 0.2 E.U./DL
WBC # BLD: 6.3 E9/L (ref 4.5–11.5)
WBC UA: ABNORMAL /HPF (ref 0–5)

## 2022-09-12 PROCEDURE — 2580000003 HC RX 258

## 2022-09-12 PROCEDURE — 2580000003 HC RX 258: Performed by: FAMILY MEDICINE

## 2022-09-12 PROCEDURE — 6360000002 HC RX W HCPCS

## 2022-09-12 PROCEDURE — 83690 ASSAY OF LIPASE: CPT

## 2022-09-12 PROCEDURE — 85025 COMPLETE CBC W/AUTO DIFF WBC: CPT

## 2022-09-12 PROCEDURE — 87088 URINE BACTERIA CULTURE: CPT

## 2022-09-12 PROCEDURE — 80053 COMPREHEN METABOLIC PANEL: CPT

## 2022-09-12 PROCEDURE — 81001 URINALYSIS AUTO W/SCOPE: CPT

## 2022-09-12 PROCEDURE — 6360000002 HC RX W HCPCS: Performed by: FAMILY MEDICINE

## 2022-09-12 PROCEDURE — 83605 ASSAY OF LACTIC ACID: CPT

## 2022-09-12 PROCEDURE — 1200000000 HC SEMI PRIVATE

## 2022-09-12 PROCEDURE — 99285 EMERGENCY DEPT VISIT HI MDM: CPT

## 2022-09-12 RX ORDER — ACETAMINOPHEN 325 MG/1
650 TABLET ORAL EVERY 6 HOURS PRN
Status: DISCONTINUED | OUTPATIENT
Start: 2022-09-12 | End: 2022-09-27 | Stop reason: HOSPADM

## 2022-09-12 RX ORDER — POLYETHYLENE GLYCOL 3350 17 G/17G
17 POWDER, FOR SOLUTION ORAL DAILY PRN
Status: DISCONTINUED | OUTPATIENT
Start: 2022-09-12 | End: 2022-09-27 | Stop reason: HOSPADM

## 2022-09-12 RX ORDER — SODIUM CHLORIDE 9 MG/ML
INJECTION, SOLUTION INTRAVENOUS PRN
Status: DISCONTINUED | OUTPATIENT
Start: 2022-09-12 | End: 2022-09-27 | Stop reason: HOSPADM

## 2022-09-12 RX ORDER — ENOXAPARIN SODIUM 100 MG/ML
30 INJECTION SUBCUTANEOUS DAILY
Status: DISCONTINUED | OUTPATIENT
Start: 2022-09-12 | End: 2022-09-13

## 2022-09-12 RX ORDER — METOPROLOL SUCCINATE 100 MG/1
100 TABLET, EXTENDED RELEASE ORAL DAILY
COMMUNITY

## 2022-09-12 RX ORDER — DESMOPRESSIN ACETATE 0.1 MG/1
0.2 TABLET ORAL 2 TIMES DAILY
Status: ON HOLD | COMMUNITY
End: 2022-09-16 | Stop reason: HOSPADM

## 2022-09-12 RX ORDER — SODIUM CHLORIDE 0.9 % (FLUSH) 0.9 %
10 SYRINGE (ML) INJECTION EVERY 12 HOURS SCHEDULED
Status: DISCONTINUED | OUTPATIENT
Start: 2022-09-12 | End: 2022-09-27 | Stop reason: HOSPADM

## 2022-09-12 RX ORDER — METOPROLOL SUCCINATE 50 MG/1
50 TABLET, EXTENDED RELEASE ORAL DAILY
COMMUNITY

## 2022-09-12 RX ORDER — 0.9 % SODIUM CHLORIDE 0.9 %
1000 INTRAVENOUS SOLUTION INTRAVENOUS ONCE
Status: COMPLETED | OUTPATIENT
Start: 2022-09-12 | End: 2022-09-12

## 2022-09-12 RX ORDER — ESCITALOPRAM OXALATE 10 MG/1
10 TABLET ORAL 2 TIMES DAILY
Status: ON HOLD | COMMUNITY
End: 2022-09-16 | Stop reason: SDUPTHER

## 2022-09-12 RX ORDER — ACETAMINOPHEN 650 MG/1
650 SUPPOSITORY RECTAL EVERY 6 HOURS PRN
Status: DISCONTINUED | OUTPATIENT
Start: 2022-09-12 | End: 2022-09-27 | Stop reason: HOSPADM

## 2022-09-12 RX ORDER — PROMETHAZINE HYDROCHLORIDE 12.5 MG/1
12.5 TABLET ORAL EVERY 6 HOURS PRN
Status: DISCONTINUED | OUTPATIENT
Start: 2022-09-12 | End: 2022-09-15

## 2022-09-12 RX ORDER — FUROSEMIDE 40 MG/1
40 TABLET ORAL
Status: ON HOLD | COMMUNITY
End: 2022-10-01 | Stop reason: SDUPTHER

## 2022-09-12 RX ORDER — ONDANSETRON 2 MG/ML
4 INJECTION INTRAMUSCULAR; INTRAVENOUS EVERY 6 HOURS PRN
Status: DISCONTINUED | OUTPATIENT
Start: 2022-09-12 | End: 2022-09-27 | Stop reason: HOSPADM

## 2022-09-12 RX ORDER — SODIUM CHLORIDE 0.9 % (FLUSH) 0.9 %
10 SYRINGE (ML) INJECTION PRN
Status: DISCONTINUED | OUTPATIENT
Start: 2022-09-12 | End: 2022-09-27 | Stop reason: HOSPADM

## 2022-09-12 RX ORDER — SODIUM CHLORIDE 9 MG/ML
INJECTION, SOLUTION INTRAVENOUS CONTINUOUS
Status: DISCONTINUED | OUTPATIENT
Start: 2022-09-12 | End: 2022-09-13

## 2022-09-12 RX ORDER — FAMOTIDINE 20 MG/1
20 TABLET, FILM COATED ORAL 2 TIMES DAILY
Status: ON HOLD | COMMUNITY
End: 2022-09-16 | Stop reason: SDUPTHER

## 2022-09-12 RX ADMIN — ENOXAPARIN SODIUM 30 MG: 100 INJECTION SUBCUTANEOUS at 09:54

## 2022-09-12 RX ADMIN — CEFTRIAXONE 2000 MG: 2 INJECTION, POWDER, FOR SOLUTION INTRAMUSCULAR; INTRAVENOUS at 04:32

## 2022-09-12 RX ADMIN — SODIUM CHLORIDE: 9 INJECTION, SOLUTION INTRAVENOUS at 07:48

## 2022-09-12 RX ADMIN — Medication 10 ML: at 09:47

## 2022-09-12 RX ADMIN — SODIUM CHLORIDE 1000 ML: 9 INJECTION, SOLUTION INTRAVENOUS at 04:33

## 2022-09-12 ASSESSMENT — PAIN DESCRIPTION - FREQUENCY: FREQUENCY: CONTINUOUS

## 2022-09-12 ASSESSMENT — PAIN SCALES - GENERAL
PAINLEVEL_OUTOF10: 0
PAINLEVEL_OUTOF10: 10

## 2022-09-12 ASSESSMENT — PAIN DESCRIPTION - PAIN TYPE: TYPE: ACUTE PAIN

## 2022-09-12 ASSESSMENT — PAIN DESCRIPTION - DESCRIPTORS: DESCRIPTORS: SHARP

## 2022-09-12 ASSESSMENT — PAIN DESCRIPTION - ORIENTATION: ORIENTATION: MID

## 2022-09-12 ASSESSMENT — PAIN DESCRIPTION - LOCATION: LOCATION: ABDOMEN

## 2022-09-12 ASSESSMENT — PAIN - FUNCTIONAL ASSESSMENT: PAIN_FUNCTIONAL_ASSESSMENT: 0-10

## 2022-09-12 ASSESSMENT — PAIN DESCRIPTION - ONSET: ONSET: ON-GOING

## 2022-09-12 NOTE — H&P
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, f/u w/ PCP    Rectal bleeding     Rhabdomyolysis 05/09/2011    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 fx T10    LUMBAR DISC SURGERY      OTHER SURGICAL HISTORY  11/1/11    removal r leg external fixator    OTHER SURGICAL HISTORY  12/14/2021    Partial Vaginectomy, Endometrial Biopsy    SIGMOIDOSCOPY N/A 3/19/2021    SIGMOIDOSCOPY HEMORRHOID BANDING performed by Nuha Ray MD at [address] [place] [address] / [place]      application TSF  7/1/13    UPPER GASTROINTESTINAL ENDOSCOPY  1/4/2016    UPPER GASTROINTESTINAL ENDOSCOPY  07/26/2017       Prior to Admission medications    Medication Sig Start Date End Date Taking? Authorizing Provider   HYDROcodone-acetaminophen (NORCO) 5-325 MG per tablet Take 1 tablet by mouth every 6 hours as needed for Pain for up to 3 days. Intended supply: 3 days. Take lowest dose possible to manage pain 9/10/22 9/13/22  Rahda Cornejo DO   promethazine-codeine (PHENERGAN WITH CODEINE) 6.25-10 MG/5ML syrup Take 5 mLs by mouth 4 times daily as needed for Cough for up to 60 days. 8/29/22 10/28/22  Kvng Centeno MD   oxyCODONE-acetaminophen (PERCOCET) 5-325 MG per tablet Take 1 tablet by mouth every 4 hours as needed for Pain (max: 150/month) for up to 30 days. 8/16/22 9/15/22  Kvng Centeno MD   hydrocortisone 2.5 % cream Apply topically 2 times daily.  8/16/22   Kvng Centeno MD omega-3 acid ethyl esters (LOVAZA) 1 g capsule Take 1 capsule by mouth in the morning and 1 capsule before bedtime. 8/16/22   Rosa Maria Kerns MD   OXYGEN Inhale 2 L/min into the lungs as needed (shortness of breath, low oxygen <90%) [facility] 7/18/22   FRIDA Bullock CNP   furosemide (LASIX) 40 MG tablet Take 1 tablet by mouth in the morning. Patient taking differently: Take 40 mg by mouth every other day 7/19/22   FRIDA Bullock CNP   dilTIAZem (CARDIZEM) 30 MG tablet Take 1 tablet by mouth in the morning and 1 tablet at noon and 1 tablet before bedtime.  7/18/22   Shanel Martínez MD   metoprolol succinate (TOPROL XL) 100 MG extended release tablet Take 1 tablet by mouth daily 7/8/22   Rosa Maria Kerns MD   metoprolol succinate (TOPROL XL) 50 MG extended release tablet TAKE ONE TABLET EVERY DAY WITH THE 100MG 7/8/22   Rosa Maria Kerns MD   levothyroxine (SYNTHROID) 75 MCG tablet Take 1 tablet by mouth daily 7/8/22   Rosa Maria Kerns MD   escitalopram (LEXAPRO) 10 MG tablet Take 1 tablet by mouth 2 times daily 7/8/22 8/30/22  Rosa Maria Kerns MD   famotidine (PEPCID) 20 MG tablet Take 1 tablet by mouth 2 times daily 7/8/22 8/30/22  Rosa Maria Kerns MD   ferrous sulfate (IRON 325) 325 (65 Fe) MG tablet Take 1 tablet by mouth 2 times daily 7/8/22   Rosa Maria Kerns MD   predniSONE (DELTASONE) 5 MG tablet Take 1 tablet by mouth daily 7/8/22   Rosa Maria Kerns MD   cycloSPORINE (RESTASIS) 0.05 % ophthalmic emulsion Place 1 drop into both eyes every 12 hours 7/8/22   Rosa Maria Kerns MD   desmopressin (DDAVP) 0.1 mg tablet Take 2 tablets by mouth 2 times daily 6/13/22 9/11/22  Rosa Maria Kerns MD   docusate sodium (COLACE) 100 mg capsule Take 1 capsule by mouth 2 times daily 6/10/22   Rosa Maria Kerns MD   albuterol sulfate HFA (PROVENTIL HFA) 108 (90 Base) MCG/ACT inhaler Inhale 2 puffs into the lungs every 6 hours as needed for Wheezing or Shortness of Breath 5/17/22   MD AMANDA De La Torre ELLIPTA 100-25 MCG/INH AEPB inhaler Inhale 1 puff into the lungs daily 3/17/22   Ruven Essex, MD   Omega 3 1000 MG CAPS Take 1 each by mouth daily 3/9/22   Ruven Essex, MD   albuterol (PROVENTIL) (2.5 MG/3ML) 0.083% nebulizer solution Take 3 mLs by nebulization every 6 hours as needed for Wheezing or Shortness of Breath 2/19/22   Eder Petersen, DO   naloxone 4 MG/0.1ML LIQD nasal spray 1 spray by Nasal route as needed for Opioid Reversal 12/14/21   Narda Santiago MD   mupirocin (BACTROBAN) 2 % ointment Apply topically daily 11/4/21   Bc Adames MD   sodium chloride (ALTAMIST SPRAY) 0.65 % nasal spray 1 spray by Nasal route as needed for Congestion 8/6/20   Jonathan Pace DO   mupirocin (BACTROBAN) 2 % ointment Apply topically daily 6/8/20   Bc Adames MD         Allergies:  Patient has no known allergies. Social History:    TOBACCO:   reports that she quit smoking about 26 years ago. Her smoking use included cigarettes. She started smoking about 51 years ago. She has a 18.75 pack-year smoking history. She has never used smokeless tobacco.  ETOH:   reports no history of alcohol use. Family History:    Reviewed in detail and negative for DM, CAD, Cancer, CVA. Positive as follows\"      Problem Relation Age of Onset    Diabetes Mother     Arthritis Mother     High Blood Pressure Mother     Arthritis Father     High Blood Pressure Father     Diabetes Father     High Blood Pressure Sister     Alcohol Abuse Sister     Substance Abuse Brother     Substance Abuse Sister     Coronary Art Dis Neg Hx     Breast Cancer Neg Hx     Ovarian Cancer Neg Hx        REVIEW OF SYSTEMS:   Pertinent positives as noted in the HPI. All other systems reviewed and negative. PHYSICAL EXAM:  BP 93/73   Pulse 90   Temp 97.8 °F (36.6 °C) (Axillary)   Resp 18   Ht 5' (1.524 m)   Wt 165 lb (74.8 kg)   LMP  (LMP Unknown)   SpO2 97%   BMI 32.22 kg/m²   General appearance: No apparent distress, appears stated age and cooperative.   HEENT: Normal cephalic, atraumatic without obvious deformity. Pupils equal, round, and reactive to light. Extra ocular muscles intact. Conjunctivae/corneas clear. Neck: Supple, with full range of motion. No jugular venous distention. Trachea midline. Respiratory: CTA  Cardiovascular: RRR  Abdomen: S/NT/ND  Musculoskeletal: No clubbing, cyanosis, edema of bilateral lower extremities. Brisk capillary refill. Skin: Normal skin color. No rashes or lesions. Neurologic:  Neurovascularly intact without any focal sensory/motor deficits. Cranial nerves: II-XII intact, grossly non-focal.  Psychiatric: Alert and oriented, thought content appropriate, normal insight    Reviewed EKG and CXR personally      CBC:   Recent Labs     09/10/22  1323 09/12/22  0250   WBC 4.2* 6.3   RBC 4.21 4.03   HGB 10.7* 10.1*   HCT 36.1 33.7*   MCV 85.7 83.6   RDW 15.0 14.7   * 135     BMP:   Recent Labs     09/10/22  1323 09/12/22  0250    138   K 3.3* 4.5   CL 99 94*   CO2 36* 30*   BUN 10 20   CREATININE 0.9 2.0*   MG 1.5*  --      LFT:  Recent Labs     09/10/22  1323 09/12/22  0250   PROT 6.1* 6.9   ALKPHOS 71 73   ALT 9 9   AST 18 20   BILITOT 0.4 0.4   LIPASE  --  13     CE:  No results for input(s): Adonica Hoose in the last 72 hours. PT/INR: No results for input(s): INR, APTT in the last 72 hours. BNP: No results for input(s): BNP in the last 72 hours.   ESR:   Lab Results   Component Value Date    SEDRATE 20 07/14/2022     CRP:   Lab Results   Component Value Date    CRP 8.0 (H) 07/14/2022     D Dimer:   Lab Results   Component Value Date    DDIMER 474 03/11/2022      Folate and B12:   Lab Results   Component Value Date    OULPNAEP01 652 (H) 08/31/2018   ,   Lab Results   Component Value Date    FOLATE 18.5 08/31/2018     Lactic Acid:   Lab Results   Component Value Date    LACTA 1.5 09/12/2022     Thyroid Studies:   Lab Results   Component Value Date    TSH 0.041 (L) 07/16/2022    I9LIKMB 5.9 02/08/2018       Oupatient labs:  Lab Results   Component Value Date    CHOL 229 (H) 11/04/2021    TRIG 70 11/04/2021    HDL 88 11/04/2021    LDLCALC 127 (H) 11/04/2021    TSH 0.041 (L) 07/16/2022    INR 1.0 03/11/2022    LABA1C 5.3 11/20/2013       Urinalysis:    Lab Results   Component Value Date/Time    NITRU Negative 09/12/2022 03:09 AM    WBCUA PACKED 04/18/2022 03:19 PM    WBCUA 1-3 04/28/2012 08:50 AM    BACTERIA MANY 04/18/2022 03:19 PM    RBCUA NONE 04/18/2022 03:19 PM    RBCUA NONE 11/20/2013 02:10 PM    BLOODU LARGE 09/12/2022 03:09 AM    SPECGRAV 1.015 09/12/2022 03:09 AM    GLUCOSEU Negative 09/12/2022 03:09 AM    GLUCOSEU NEGATIVE 04/28/2012 08:50 AM       Imaging:  CT ABDOMEN PELVIS W IV CONTRAST Additional Contrast? None    Result Date: 9/10/2022  EXAMINATION: CT OF THE ABDOMEN AND PELVIS WITH CONTRAST 9/10/2022 5:12 pm TECHNIQUE: CT of the abdomen and pelvis was performed with the administration of intravenous contrast. Multiplanar reformatted images are provided for review. Automated exposure control, iterative reconstruction, and/or weight based adjustment of the mA/kV was utilized to reduce the radiation dose to as low as reasonably achievable. COMPARISON: July 14, 2022 HISTORY: ORDERING SYSTEM PROVIDED HISTORY: Abdominal pain, prior abdominal hernia repair TECHNOLOGIST PROVIDED HISTORY: Additional Contrast?->None Reason for exam:->Abdominal pain, prior abdominal hernia repair Decision Support Exception - unselect if not a suspected or confirmed emergency medical condition->Emergency Medical Condition (MA) FINDINGS: Redemonstration of broad-based ventral abdominal wall hernia measuring approximately 9 cm in AP dimension with base of approximately 13 cm in axial dimension. Multiple segments of bowel and mesenteric fat are present within herniation sac. No bowel obstruction or strangulation. Multiple diverticula involving the left colon. Postsurgical changes related to bowel resection present in right lower abdomen.   The appendix is not definitively visualized, however no focal inflammatory changes in its expected location. No intrahepatic or extrahepatic bile duct dilatation. Multiple tiny gallstones layer in the gallbladder. No evidence of acute pancreatitis. Spleen is normal in size. Adrenal glands are normal.  No hydronephrosis or perinephric edema. No retroperitoneal lymphadenopathy. Urinary bladder is grossly unremarkable yet not optimally distended. Irregular opacities are present in the lung bases slightly decreased compared to prior. 1. Stable ventral abdominal wall hernia. 2. No bowel obstruction, free air, or free fluid. 3. Diverticulosis 4. Cholelithiasis 5. Redemonstration of opacities in lung bases suggestive of subsegmental atelectasis and chronic interstitial lung disease. ASSESSMENT:  -Acute renal failure  -Urinary tract infection  -Bright red blood per rectum  -History of atrial fibrillation  -COPD      PLAN:  -Admit to medicine  -Ceftriaxone 1 g daily  -Urine culture  -Monitor renal function avoid nephrotoxic medications  -Normal saline 75 mL/  -Monitor hemoglobin levels  -Transfuse for hemoglobin less than 7.0  -Considered general surgery consult      Diet: No diet orders on file  Code Status: Prior  Surrogate decision maker confirmed with patient:   Extended Emergency Contact Information  Primary Emergency Contact: [facility]  Address: [address] [place] [address] Phone: [phone]  Relation: Spouse    DVT Prophylaxis: []Lovenox []Heparin []PCD [] [address] []Encouraged ambulation  Disposition: []Med/Surg [] Intermediate [] ICU/CCU  Admit status: [] Observation [] Inpatient     +++++++++++++++++++++++++++++++++++++++++++++++++  Marin Frankel DO  +++++++++++++++++++++++++++++++++++++++++++++++++  NOTE: This report was transcribed using voice recognition software.  Every effort was made to ensure accuracy; however, inadvertent computerized transcription errors may be present.

## 2022-09-12 NOTE — LETTER
[place] Department Medicaid  CERTIFICATION OF NECESSITY  FOR NON-EMERGENCY TRANSPORTATION   BY GROUND AMBULANCE      Individual Information   1. Name: Rosie Bryant 2. [place] Medicaid Billing Number:    3. Address: [address]      Transportation Provider Information   4. Provider Name:    5. [place] Medicaid Provider Number:  National Provider Identifier (NPI):      Certification  7. Criteria:  During transport, this individual requires:  [] Medical treatment or continuous     supervision by an EMT. [x] The administration or regulation of oxygen by another person. [] Supervised protective restraint. 8. Period Beginning Date: 9/27/22   9. Length   [x] Not more than 1 day(s)  [] One Year     Additional Information Relevant to Certification   10. Comments or Explanations, If Necessary or Appropriate   Urinary tract infection, altered mental status, intermittent confusion, max assist for transfers, Acute on chronic respiratory failure with hypoxia and hypercapnia, on 3L NC     Certifying Practitioner Information   11. Name of Practitioner: Amelia Archuleta MD   12. [place] Medicaid Provider Number, If Applicable:  [address] Provider Identifier (NPI):      Signature Information   14. Date of Signature: 9/27/22 15. Name of Person Signing: Arabella Crowley   12. Signature and Professional Designation: Electronically signed by JIGAR Muse on 9/27/2022 at 11:00 AM     ODM [id number]  Rev. 7/2015       [facility] Encounter Date/Time: 9/12/2022 [address] Account: [de-identified]    MRN: [id number]    Patient: Rosie Bryant    Contact Serial #: [id number]      ENCOUNTER          Patient Class: I Private Enc?   No Unit  Bety Groves [address] [facility]   Hospital Service: MED   Encounter DX: UTI (urinary tract infec*   ADM Provider: Bi Witt DO   Procedure:     ATT Provider: Peter Mina MD   REF Provider:        Admission DX: UTI (urinary tract infection) and DX codes: N39.0    PATIENT                 Name: Shaji Gallardo : 1955 (66 yrs)   Address: [address] Sex: Female   [place] city: [place] [address]         Marital Status:    Employer: DISABLED         Nondenominational: Scott Crespo   Primary Care Provider: Lisandra Wayne MD         Primary Phone: [phone]   EMERGENCY CONTACT   Contact Name Legal Guardian? Relationship to Patient Home Phone Work Phone   1. [address] [facility]  2. *No Contact Specified*      Spouse    [phone]                 GUARANTOR            Guarantor: Shaji Gallardo     : 1955   [address] [facility] [place] Sex: [place]   [place] [address]     Relation to Patient: Self       Home Phone: [phone]   Guarantor ID: [id number]       Work Phone:     Guarantor Employer: DISABLED         Status: DISABLED      COVERAGE        PRIMARY INSURANCE   Payor: [facility] MEDICARE Plan: [employer]*   Payor Address: ,          Group Number:   Insurance Type: INDEMNITY   Subscriber Name: Shu Pavon : 1955   Subscriber ID: [id number] Pat. Rel. to Sub: Self   SECONDARY INSURANCE   Payor: [address] [place] [address] Drive Address:  [address] [place] [address], [address] [facility]          Group Number: [id number] Insurance Type: INDEMNITY   Subscriber Name: Shu Pavon : 1955   Subscriber ID: [id number] Pat.  Rel. to Sub: SELF         CSN: [id number]

## 2022-09-12 NOTE — ED NOTES
Pt remains drowsy, awakens to verbal stimulus. Pt A&Ox3, will answer name, , and location correctly with verbal reinforcement. Pt not forth coming when answering questions, pt responds with Sara Krabbe do people keep asking me this? \" Pt unable to state the year at this time.       Alba Wong RN  22 [id number]

## 2022-09-12 NOTE — ED PROVIDER NOTES
[address]      Pt Name: Quentin Joyce  MRN: [id number]  [place] 1955  Date of evaluation: 9/12/2022      CHIEF COMPLAINT       Chief Complaint   Patient presents with    Rectal Bleeding     Bright red rectal bleeding started few hours ago. Pt at [address] yesterday for umbilical hernia        HPI  Quentin Joyce is a 73 y.o. female  with PMHx of CKD, pulmonary sarcoidosis, GERD, diabetes insipidus, A. fib, CHF, A. fib, COPD(on 5L), hyperlipidemia, ventral hernia (sp repair) presents the ED with complaints of abdominal pain. States abdominal pain is chronic since repair but worsening for the past 2 days. Describes it as achy, non-radiating, generalized, constant rated 6-7/10, moderate in severity with no alleviating or exacerbating factors. Denies any fever, chills, n/v, headache, dizziness, vision changes, neck tenderness or stiffness, weakness, cp, palpitations, leg swelling/tenderness, sob, cough, hematuria, diarrhea, constipation. Except as noted above the remainder of the review of systems was reviewed and negative. Review of Systems   Constitutional:  Negative for activity change, appetite change, chills, fatigue and fever. HENT:  Negative for congestion, nosebleeds and tinnitus. Eyes:  Negative for visual disturbance. Respiratory:  Negative for cough, chest tightness, shortness of breath and wheezing. Cardiovascular:  Negative for chest pain, palpitations and leg swelling. Gastrointestinal:  Positive for abdominal pain and blood in stool. Negative for anal bleeding, constipation, diarrhea, nausea and vomiting. Endocrine: Negative for polydipsia and polyphagia. Genitourinary:  Negative for dysuria, flank pain, hematuria, vaginal bleeding, vaginal discharge and vaginal pain. Musculoskeletal:  Negative for back pain, gait problem, joint swelling and myalgias. Skin:  Negative for rash.    Allergic/Immunologic: Negative for immunocompromised state. Neurological:  Negative for dizziness, syncope, weakness, numbness and headaches. Hematological:  Negative for adenopathy. Psychiatric/Behavioral:  Negative for behavioral problems, confusion and hallucinations. Physical Exam  Vitals reviewed. Constitutional:       General: She is not in acute distress. Appearance: Normal appearance. She is normal weight. She is not ill-appearing, toxic-appearing or diaphoretic. HENT:      Head: Normocephalic and atraumatic. Right Ear: External ear normal.      Left Ear: External ear normal.      Nose: Nose normal. No congestion or rhinorrhea. Mouth/Throat:      Mouth: Mucous membranes are moist.      Pharynx: Oropharynx is clear. No oropharyngeal exudate or posterior oropharyngeal erythema. Eyes:      Conjunctiva/sclera: Conjunctivae normal.      Pupils: Pupils are equal, round, and reactive to light. Cardiovascular:      Rate and Rhythm: Normal rate and regular rhythm. Pulses: Normal pulses. Heart sounds: Normal heart sounds. Pulmonary:      Effort: Pulmonary effort is normal. No respiratory distress. Breath sounds: Normal breath sounds. No stridor. No wheezing, rhonchi or rales. Abdominal:      General: Abdomen is flat. Bowel sounds are normal. There is no distension. Palpations: Abdomen is soft. There is no mass. Tenderness: There is abdominal tenderness. There is no right CVA tenderness, left CVA tenderness or guarding. Hernia: A hernia is present. Musculoskeletal:      Cervical back: Normal range of motion. Right lower leg: No edema. Left lower leg: No edema. Skin:     General: Skin is warm and dry. Capillary Refill: Capillary refill takes less than 2 seconds. Neurological:      General: No focal deficit present. Mental Status: She is alert and oriented to person, place, and time. Mental status is at baseline.    Psychiatric:         Mood and Affect: Mood Lumbar disc herniation, Myalgia and myositis, unspecified, Nephrosclerosis, Nonunion of fracture, Osteoarthritis, PAF (paroxysmal atrial fibrillation) ([address] 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[address] Pulmonary hypertension ([address] Pulmonary sarcoidosis ([address] Rectal bleeding, Rhabdomyolysis, Steroid-induced avascular necrosis of shoulder ([address] Tibia fracture, and Ventral hernia without obstruction or gangrene. Past Surgical History:  has a past surgical history that includes fracture surgery (2010); Kyphosis surgery; Lumbar disc surgery; Tibia / fibia lengthening; other surgical history (11/1/11); Abdomen surgery (2008); Echo Complete (4/22/2013); ECHO Compl W Dop Color Flow (8/16/2015); Upper gastrointestinal endoscopy (1/4/2016); Hemorrhoid surgery (12/08/2016); Colonoscopy (2008); Colonoscopy (04/11/2014); Colonoscopy (11/23/2015); Colonoscopy (10/24/2016); Colonoscopy (07/26/2017); Upper gastrointestinal endoscopy (07/26/2017); sigmoidoscopy (N/A, 3/19/2021); and other surgical history (12/14/2021). Social History:  reports that she quit smoking about 26 years ago. Her smoking use included cigarettes. She started smoking about 51 years ago. She has a 18.75 pack-year smoking history. She has never used smokeless tobacco. She reports that she does not currently use drugs. She reports that she does not drink alcohol. Family History: family history includes Alcohol Abuse in her sister; Arthritis in her father and mother; Diabetes in her father and mother; High Blood Pressure in her father, mother, and sister; Substance Abuse in her brother and sister. The patients home medications have been reviewed. Allergies: Patient has no known allergies.     -------------------------------------------------- RESULTS -------------------------------------------------    LABS:  Results for orders placed or performed during the hospital encounter of 09/12/22   Culture, Urine    Specimen: Urine, clean catch   Result Clear    Glucose, Ur Negative Negative mg/dL    Bilirubin Urine Negative Negative    Ketones, Urine TRACE (A) Negative mg/dL    Specific Gravity, UA 1.015 1.005 - 1.030    Blood, Urine LARGE (A) Negative    pH, UA 5.5 5.0 - 9.0    Protein,  (A) Negative mg/dL    Urobilinogen, Urine 0.2 <2.0 E.U./dL    Nitrite, Urine Negative Negative    Leukocyte Esterase, Urine SMALL (A) Negative    WBC, UA 5-10 (A) 0 - 5 /HPF    RBC, UA 10-20 (A) 0 - 2 /HPF    Epithelial Cells, UA RARE /HPF    Bacteria, UA MANY (A) None Seen /HPF   CBC with Auto Differential   Result Value Ref Range    WBC 3.9 (L) 4.5 - 11.5 E9/L    RBC 3.60 3.50 - 5.50 E12/L    Hemoglobin 9.4 (L) 11.5 - 15.5 g/dL    Hematocrit 30.9 (L) 34.0 - 48.0 %    MCV 85.8 80.0 - 99.9 fL    MCH 26.1 26.0 - 35.0 pg    MCHC 30.4 (L) 32.0 - 34.5 %    RDW 14.8 11.5 - 15.0 fL    Platelets 497 (L) 846 - 450 E9/L    MPV 10.3 7.0 - 12.0 fL    Neutrophils % 80.0 43.0 - 80.0 %    Lymphocytes % 10.4 (L) 20.0 - 42.0 %    Monocytes % 3.5 2.0 - 12.0 %    Eosinophils % 6.1 (H) 0.0 - 6.0 %    Basophils % 0.5 0.0 - 2.0 %    Neutrophils Absolute 3.12 1.80 - 7.30 E9/L    Lymphocytes Absolute 0.39 (L) 1.50 - 4.00 E9/L    Monocytes Absolute 0.16 0.10 - 0.95 E9/L    Eosinophils Absolute 0.24 0.05 - 0.50 E9/L    Basophils Absolute 0.00 0.00 - 0.20 E9/L    Smudge Cells 1+     Anisocytosis 1+     Hypochromia 1+     Poikilocytes 1+     Ovalocytes 1+    Comprehensive Metabolic Panel w/ Reflex to MG   Result Value Ref Range    Sodium 139 132 - 146 mmol/L    Potassium reflex Magnesium 3.9 3.5 - 5.0 mmol/L    Chloride 100 98 - 107 mmol/L    CO2 31 (H) 22 - 29 mmol/L    Anion Gap 8 7 - 16 mmol/L    Glucose 80 74 - 99 mg/dL    BUN 11 6 - 23 mg/dL    Creatinine 1.0 0.5 - 1.0 mg/dL    GFR Non-African American >60 >=60 mL/min/1.73    GFR African American >60     Calcium 8.2 (L) 8.6 - 10.2 mg/dL    Total Protein 5.4 (L) 6.4 - 8.3 g/dL    Albumin 2.9 (L) 3.5 - 5.2 g/dL    Total Bilirubin 0.3 0.0 - 1.2 mg/dL    Alkaline Phosphatase 61 35 - 104 U/L    ALT 8 0 - 32 U/L    AST 17 0 - 31 U/L   Hemoglobin and Hematocrit   Result Value Ref Range    Hemoglobin 10.0 (L) 11.5 - 15.5 g/dL    Hematocrit 33.7 (L) 34.0 - 48.0 %   CBC with Auto Differential   Result Value Ref Range    WBC 3.4 (L) 4.5 - 11.5 E9/L    RBC 3.94 3.50 - 5.50 E12/L    Hemoglobin 10.0 (L) 11.5 - 15.5 g/dL    Hematocrit 34.0 34.0 - 48.0 %    MCV 86.3 80.0 - 99.9 fL    MCH 25.4 (L) 26.0 - 35.0 pg    MCHC 29.4 (L) 32.0 - 34.5 %    RDW 14.7 11.5 - 15.0 fL    Platelets 019 225 - 704 E9/L    MPV 10.6 7.0 - 12.0 fL    Neutrophils % 69.6 43.0 - 80.0 %    Lymphocytes % 13.9 (L) 20.0 - 42.0 %    Monocytes % 11.3 2.0 - 12.0 %    Eosinophils % 4.3 0.0 - 6.0 %    Basophils % 0.9 0.0 - 2.0 %    Neutrophils Absolute 2.38 1.80 - 7.30 E9/L    Lymphocytes Absolute 0.48 (L) 1.50 - 4.00 E9/L    Monocytes Absolute 0.37 0.10 - 0.95 E9/L    Eosinophils Absolute 0.15 0.05 - 0.50 E9/L    Basophils Absolute 0.03 0.00 - 0.20 E9/L    Anisocytosis 1+     Polychromasia 1+     Poikilocytes 1+     Ovalocytes 1+    Basic Metabolic Panel w/ Reflex to MG   Result Value Ref Range    Sodium 139 132 - 146 mmol/L    Potassium reflex Magnesium 3.8 3.5 - 5.0 mmol/L    Chloride 97 (L) 98 - 107 mmol/L    CO2 30 (H) 22 - 29 mmol/L    Anion Gap 12 7 - 16 mmol/L    Glucose 91 74 - 99 mg/dL    BUN 8 6 - 23 mg/dL    Creatinine 0.9 0.5 - 1.0 mg/dL    GFR Non-African American >60 >=60 mL/min/1.73    GFR African American >60     Calcium 8.6 8.6 - 10.2 mg/dL   Basic Metabolic Panel w/ Reflex to MG   Result Value Ref Range    Sodium 140 132 - 146 mmol/L    Potassium reflex Magnesium 3.6 3.5 - 5.0 mmol/L    Chloride 97 (L) 98 - 107 mmol/L    CO2 31 (H) 22 - 29 mmol/L    Anion Gap 12 7 - 16 mmol/L    Glucose 114 (H) 74 - 99 mg/dL    BUN 8 6 - 23 mg/dL    Creatinine 0.9 0.5 - 1.0 mg/dL    GFR Non-African American >60 >=60 mL/min/1.73    GFR African American >60     Calcium 8.7 8.6 - 10.2 mg/dL   Lactic Acid

## 2022-09-13 LAB
ALBUMIN SERPL-MCNC: 2.9 G/DL (ref 3.5–5.2)
ALP BLD-CCNC: 61 U/L (ref 35–104)
ALT SERPL-CCNC: 8 U/L (ref 0–32)
ANION GAP SERPL CALCULATED.3IONS-SCNC: 8 MMOL/L (ref 7–16)
ANISOCYTOSIS: ABNORMAL
AST SERPL-CCNC: 17 U/L (ref 0–31)
BASOPHILS ABSOLUTE: 0 E9/L (ref 0–0.2)
BASOPHILS RELATIVE PERCENT: 0.5 % (ref 0–2)
BILIRUB SERPL-MCNC: 0.3 MG/DL (ref 0–1.2)
BUN BLDV-MCNC: 11 MG/DL (ref 6–23)
CALCIUM SERPL-MCNC: 8.2 MG/DL (ref 8.6–10.2)
CHLORIDE BLD-SCNC: 100 MMOL/L (ref 98–107)
CO2: 31 MMOL/L (ref 22–29)
CREAT SERPL-MCNC: 1 MG/DL (ref 0.5–1)
EOSINOPHILS ABSOLUTE: 0.24 E9/L (ref 0.05–0.5)
EOSINOPHILS RELATIVE PERCENT: 6.1 % (ref 0–6)
GFR AFRICAN AMERICAN: >60
GFR NON-AFRICAN AMERICAN: >60 ML/MIN/1.73
GLUCOSE BLD-MCNC: 80 MG/DL (ref 74–99)
HCT VFR BLD CALC: 30.9 % (ref 34–48)
HCT VFR BLD CALC: 33.7 % (ref 34–48)
HEMOGLOBIN: 10 G/DL (ref 11.5–15.5)
HEMOGLOBIN: 9.4 G/DL (ref 11.5–15.5)
HYPOCHROMIA: ABNORMAL
LYMPHOCYTES ABSOLUTE: 0.39 E9/L (ref 1.5–4)
LYMPHOCYTES RELATIVE PERCENT: 10.4 % (ref 20–42)
MCH RBC QN AUTO: 26.1 PG (ref 26–35)
MCHC RBC AUTO-ENTMCNC: 30.4 % (ref 32–34.5)
MCV RBC AUTO: 85.8 FL (ref 80–99.9)
MONOCYTES ABSOLUTE: 0.16 E9/L (ref 0.1–0.95)
MONOCYTES RELATIVE PERCENT: 3.5 % (ref 2–12)
NEUTROPHILS ABSOLUTE: 3.12 E9/L (ref 1.8–7.3)
NEUTROPHILS RELATIVE PERCENT: 80 % (ref 43–80)
OVALOCYTES: ABNORMAL
PDW BLD-RTO: 14.8 FL (ref 11.5–15)
PLATELET # BLD: 120 E9/L (ref 130–450)
PMV BLD AUTO: 10.3 FL (ref 7–12)
POIKILOCYTES: ABNORMAL
POTASSIUM REFLEX MAGNESIUM: 3.9 MMOL/L (ref 3.5–5)
RBC # BLD: 3.6 E12/L (ref 3.5–5.5)
SMUDGE CELLS: ABNORMAL
SODIUM BLD-SCNC: 139 MMOL/L (ref 132–146)
TOTAL PROTEIN: 5.4 G/DL (ref 6.4–8.3)
WBC # BLD: 3.9 E9/L (ref 4.5–11.5)

## 2022-09-13 PROCEDURE — 80053 COMPREHEN METABOLIC PANEL: CPT

## 2022-09-13 PROCEDURE — 94664 DEMO&/EVAL PT USE INHALER: CPT

## 2022-09-13 PROCEDURE — 85018 HEMOGLOBIN: CPT

## 2022-09-13 PROCEDURE — 6360000002 HC RX W HCPCS: Performed by: FAMILY MEDICINE

## 2022-09-13 PROCEDURE — 2700000000 HC OXYGEN THERAPY PER DAY

## 2022-09-13 PROCEDURE — 36415 COLL VENOUS BLD VENIPUNCTURE: CPT

## 2022-09-13 PROCEDURE — 85014 HEMATOCRIT: CPT

## 2022-09-13 PROCEDURE — 94640 AIRWAY INHALATION TREATMENT: CPT

## 2022-09-13 PROCEDURE — 2580000003 HC RX 258: Performed by: STUDENT IN AN ORGANIZED HEALTH CARE EDUCATION/TRAINING PROGRAM

## 2022-09-13 PROCEDURE — 1200000000 HC SEMI PRIVATE

## 2022-09-13 PROCEDURE — 85025 COMPLETE CBC W/AUTO DIFF WBC: CPT

## 2022-09-13 PROCEDURE — 6360000002 HC RX W HCPCS: Performed by: STUDENT IN AN ORGANIZED HEALTH CARE EDUCATION/TRAINING PROGRAM

## 2022-09-13 PROCEDURE — 2580000003 HC RX 258: Performed by: FAMILY MEDICINE

## 2022-09-13 PROCEDURE — 6370000000 HC RX 637 (ALT 250 FOR IP): Performed by: FAMILY MEDICINE

## 2022-09-13 RX ORDER — METOPROLOL SUCCINATE 100 MG/1
100 TABLET, EXTENDED RELEASE ORAL DAILY
Status: DISCONTINUED | OUTPATIENT
Start: 2022-09-13 | End: 2022-09-27 | Stop reason: HOSPADM

## 2022-09-13 RX ORDER — OXYCODONE HYDROCHLORIDE AND ACETAMINOPHEN 5; 325 MG/1; MG/1
1 TABLET ORAL EVERY 4 HOURS PRN
Status: DISCONTINUED | OUTPATIENT
Start: 2022-09-13 | End: 2022-09-14

## 2022-09-13 RX ORDER — DESMOPRESSIN ACETATE 0.1 MG/1
200 TABLET ORAL 2 TIMES DAILY
Status: DISCONTINUED | OUTPATIENT
Start: 2022-09-13 | End: 2022-09-19

## 2022-09-13 RX ORDER — PREDNISONE 1 MG/1
5 TABLET ORAL DAILY
Status: DISCONTINUED | OUTPATIENT
Start: 2022-09-13 | End: 2022-09-27 | Stop reason: HOSPADM

## 2022-09-13 RX ORDER — FUROSEMIDE 40 MG/1
40 TABLET ORAL
Status: DISCONTINUED | OUTPATIENT
Start: 2022-09-14 | End: 2022-09-17

## 2022-09-13 RX ORDER — FAMOTIDINE 20 MG/1
20 TABLET, FILM COATED ORAL DAILY
Status: DISCONTINUED | OUTPATIENT
Start: 2022-09-13 | End: 2022-09-27 | Stop reason: HOSPADM

## 2022-09-13 RX ORDER — BUDESONIDE 0.25 MG/2ML
0.25 INHALANT ORAL 2 TIMES DAILY
Status: DISCONTINUED | OUTPATIENT
Start: 2022-09-13 | End: 2022-09-27 | Stop reason: HOSPADM

## 2022-09-13 RX ORDER — DOCUSATE SODIUM 100 MG/1
100 CAPSULE, LIQUID FILLED ORAL 2 TIMES DAILY
Status: DISCONTINUED | OUTPATIENT
Start: 2022-09-13 | End: 2022-09-15

## 2022-09-13 RX ORDER — ENOXAPARIN SODIUM 100 MG/ML
40 INJECTION SUBCUTANEOUS DAILY
Status: DISCONTINUED | OUTPATIENT
Start: 2022-09-14 | End: 2022-09-27 | Stop reason: HOSPADM

## 2022-09-13 RX ORDER — LEVOTHYROXINE SODIUM 0.07 MG/1
75 TABLET ORAL
Status: DISCONTINUED | OUTPATIENT
Start: 2022-09-13 | End: 2022-09-27 | Stop reason: HOSPADM

## 2022-09-13 RX ORDER — ESCITALOPRAM OXALATE 10 MG/1
10 TABLET ORAL 2 TIMES DAILY
Status: DISCONTINUED | OUTPATIENT
Start: 2022-09-13 | End: 2022-09-27 | Stop reason: HOSPADM

## 2022-09-13 RX ORDER — ARFORMOTEROL TARTRATE 15 UG/2ML
15 SOLUTION RESPIRATORY (INHALATION) 2 TIMES DAILY
Status: DISCONTINUED | OUTPATIENT
Start: 2022-09-13 | End: 2022-09-27 | Stop reason: HOSPADM

## 2022-09-13 RX ORDER — FERROUS SULFATE 325(65) MG
325 TABLET ORAL 2 TIMES DAILY WITH MEALS
Status: DISCONTINUED | OUTPATIENT
Start: 2022-09-13 | End: 2022-09-27 | Stop reason: HOSPADM

## 2022-09-13 RX ORDER — ALBUTEROL SULFATE 2.5 MG/3ML
2.5 SOLUTION RESPIRATORY (INHALATION) EVERY 6 HOURS PRN
Status: DISCONTINUED | OUTPATIENT
Start: 2022-09-13 | End: 2022-09-27 | Stop reason: HOSPADM

## 2022-09-13 RX ADMIN — ALBUTEROL SULFATE 2.5 MG: 2.5 SOLUTION RESPIRATORY (INHALATION) at 23:45

## 2022-09-13 RX ADMIN — WATER 1000 MG: 1 INJECTION INTRAMUSCULAR; INTRAVENOUS; SUBCUTANEOUS at 08:52

## 2022-09-13 RX ADMIN — DOCUSATE SODIUM 100 MG: 100 CAPSULE, LIQUID FILLED ORAL at 08:53

## 2022-09-13 RX ADMIN — ESCITALOPRAM OXALATE 10 MG: 10 TABLET ORAL at 20:38

## 2022-09-13 RX ADMIN — DILTIAZEM HYDROCHLORIDE 30 MG: 30 TABLET, FILM COATED ORAL at 20:38

## 2022-09-13 RX ADMIN — DILTIAZEM HYDROCHLORIDE 30 MG: 30 TABLET, FILM COATED ORAL at 08:53

## 2022-09-13 RX ADMIN — OXYCODONE AND ACETAMINOPHEN 1 TABLET: 5; 325 TABLET ORAL at 20:38

## 2022-09-13 RX ADMIN — ARFORMOTEROL TARTRATE 15 MCG: 15 SOLUTION RESPIRATORY (INHALATION) at 10:54

## 2022-09-13 RX ADMIN — LEVOTHYROXINE SODIUM 75 MCG: 0.07 TABLET ORAL at 05:22

## 2022-09-13 RX ADMIN — BUDESONIDE 250 MCG: 0.25 SUSPENSION RESPIRATORY (INHALATION) at 20:22

## 2022-09-13 RX ADMIN — OXYCODONE AND ACETAMINOPHEN 1 TABLET: 5; 325 TABLET ORAL at 13:49

## 2022-09-13 RX ADMIN — ARFORMOTEROL TARTRATE 15 MCG: 15 SOLUTION RESPIRATORY (INHALATION) at 20:22

## 2022-09-13 RX ADMIN — Medication 10 ML: at 20:38

## 2022-09-13 RX ADMIN — OXYCODONE AND ACETAMINOPHEN 1 TABLET: 5; 325 TABLET ORAL at 04:59

## 2022-09-13 RX ADMIN — FERROUS SULFATE TAB 325 MG (65 MG ELEMENTAL FE) 325 MG: 325 (65 FE) TAB at 08:53

## 2022-09-13 RX ADMIN — DESMOPRESSIN ACETATE 200 MCG: 0.1 TABLET ORAL at 20:38

## 2022-09-13 RX ADMIN — FAMOTIDINE 20 MG: 20 TABLET ORAL at 08:53

## 2022-09-13 RX ADMIN — BUDESONIDE 250 MCG: 0.25 SUSPENSION RESPIRATORY (INHALATION) at 10:55

## 2022-09-13 RX ADMIN — PREDNISONE 5 MG: 5 TABLET ORAL at 08:53

## 2022-09-13 RX ADMIN — DESMOPRESSIN ACETATE 200 MCG: 0.1 TABLET ORAL at 10:42

## 2022-09-13 RX ADMIN — OXYCODONE AND ACETAMINOPHEN 1 TABLET: 5; 325 TABLET ORAL at 08:55

## 2022-09-13 RX ADMIN — DILTIAZEM HYDROCHLORIDE 30 MG: 30 TABLET, FILM COATED ORAL at 13:30

## 2022-09-13 RX ADMIN — DOCUSATE SODIUM 100 MG: 100 CAPSULE, LIQUID FILLED ORAL at 20:38

## 2022-09-13 RX ADMIN — ENOXAPARIN SODIUM 30 MG: 100 INJECTION SUBCUTANEOUS at 08:53

## 2022-09-13 RX ADMIN — METOPROLOL SUCCINATE 100 MG: 100 TABLET, FILM COATED, EXTENDED RELEASE ORAL at 08:53

## 2022-09-13 RX ADMIN — ESCITALOPRAM OXALATE 10 MG: 10 TABLET ORAL at 08:53

## 2022-09-13 RX ADMIN — FERROUS SULFATE TAB 325 MG (65 MG ELEMENTAL FE) 325 MG: 325 (65 FE) TAB at 15:57

## 2022-09-13 ASSESSMENT — PAIN SCALES - GENERAL
PAINLEVEL_OUTOF10: 9
PAINLEVEL_OUTOF10: 10
PAINLEVEL_OUTOF10: 9
PAINLEVEL_OUTOF10: 9
PAINLEVEL_OUTOF10: 5

## 2022-09-13 ASSESSMENT — PAIN DESCRIPTION - FREQUENCY: FREQUENCY: CONTINUOUS

## 2022-09-13 ASSESSMENT — PAIN DESCRIPTION - DESCRIPTORS: DESCRIPTORS: ACHING;DISCOMFORT;SORE

## 2022-09-13 ASSESSMENT — PAIN DESCRIPTION - LOCATION
LOCATION: BUTTOCKS;ABDOMEN
LOCATION: BUTTOCKS;ABDOMEN

## 2022-09-13 ASSESSMENT — PAIN DESCRIPTION - ONSET: ONSET: ON-GOING

## 2022-09-13 ASSESSMENT — PAIN DESCRIPTION - PAIN TYPE: TYPE: ACUTE PAIN

## 2022-09-13 ASSESSMENT — PAIN - FUNCTIONAL ASSESSMENT: PAIN_FUNCTIONAL_ASSESSMENT: PREVENTS OR INTERFERES SOME ACTIVE ACTIVITIES AND ADLS

## 2022-09-13 NOTE — TELEPHONE ENCOUNTER
She can take it twice daily  I will make a note and change it in the system  Thanks! Chonodrocutaneous Helical Advancement Flap Text: The defect edges were debeveled with a #15 scalpel blade.  Given the location of the defect and the proximity to free margins a chondrocutaneous helical advancement flap was deemed most appropriate.  Using a sterile surgical marker, the appropriate advancement flap was drawn incorporating the defect and placing the expected incisions within the relaxed skin tension lines where possible.    The area thus outlined was incised deep to adipose tissue with a #15 scalpel blade.  The skin margins were undermined to an appropriate distance in all directions utilizing iris scissors.

## 2022-09-13 NOTE — PLAN OF CARE
Patient's chart updated to reflect:      . - HF care plan, HF education points and HF discharge instructions.  -Orders: 2 gram sodium diet, daily weights, I/O.  -PCP and/or Cardiologist appointment to be scheduled within 7 days of hospital discharge.  -History of HF, not primary admission Dx.   Patient admitted for treatment of ASSESSMENT:  -Acute renal failure  -Urinary tract infection  -Bright red blood per rectum  -History of atrial fibrillation  -COPD  Tracy Chiu, RN RN, BSN  Heart Failure Navigator

## 2022-09-13 NOTE — HOME CARE
Patient current with [facility] for SN. Will need PA orders if appropriate at discharge. Trena Fan LPN  [facility].

## 2022-09-13 NOTE — CARE COORDINATION
9/13 Care Coordination. Pt was admit from ED yesterday for abdominal pain and BRBPR. Receiving Rocephin IV Q24H for UTI. Cr on admit 2.0, currently 1.0. Hgb on admit 10.1, currently 9.4 and is being trended. CT AP revealed stable ventral abdominal wall hernia, no bowel obstruction, and opacities suggestive of subsegmental atelectasis. Met with pt at bedside to discuss transition of care planning. Pt's PCP is Christin Kline and she uses [facility]. Pt lives with her  Tyler Corey in a 1 story home with WC ramp to enter. DME owned is FWW, cane, BSC, hospital bed, 4L NC provided through [facility], nebulizer, and working glucometer. Pt is active with [facility] for SN. PA orders placed. BRAXTON/destination complete. Hx of FRITZ at [facility]. Plan on discharge is to return home with [facility] and Tyler Corey will provide transportation. Received call from Lupe AVILEZ AND [facility] transition of care nurse) for pt. Phone # to reach is [phone]. Per Lupe Case, pt has waiver services which include housekeeping, home delivered meals via [employer], and emergency response button. She also receives incontinence supplies from [facility].    [address]  Pt requested HCPOA paperwork. CM printed from [employer] and provided this to patient.     SHARON DixonN, RN  [facility] Case Management   Cell: [phone]

## 2022-09-13 NOTE — PROGRESS NOTES
chloride flush, sodium chloride, promethazine **OR** ondansetron, polyethylene glycol, acetaminophen **OR** acetaminophen      Intake/Output Summary (Last 24 hours) at 9/13/2022 1308  Last data filed at 9/13/2022 1047  Gross per 24 hour   Intake 1105 ml   Output 300 ml   Net 805 ml       Exam:    BP (!) 121/94   Pulse 59   Temp 97.9 °F (36.6 °C) (Oral)   Resp 16   Ht 5' (1.524 m)   Wt 165 lb (74.8 kg)   LMP  (LMP Unknown)   SpO2 96%   BMI 32.22 kg/m²     General appearance: Obese, appears stated age and cooperative. HEENT: Pupils equal, round, and reactive to light. Conjunctivae/corneas clear. Neck: Supple, with full range of motion. No jugular venous distention. Trachea midline. Respiratory:  Normal respiratory effort. Clear to auscultation, bilaterally without Rales/Wheezes/Rhonchi. Cardiovascular: Regular rate and rhythm with normal S1/S2 without murmurs, rubs or gallops. Abdomen: Soft, non-tender, non-distended with normal bowel sounds. hernia noted with protruding mass on the left side of the abdomen, no tenderness to palpation  Musculoskeletal: No clubbing, cyanosis or edema bilaterally. Full range of motion without deformity. Skin: Skin color, texture, turgor normal.  No rashes or lesions. Left-sided abdominal mass/hernia with skin ulceration oblique-chronic nonhealing  Neurologic: No focal deficit, alert and oriented      Labs:   Recent Labs     09/10/22  1323 09/12/22  0250 09/13/22  0605   WBC 4.2* 6.3 3.9*   HGB 10.7* 10.1* 9.4*   HCT 36.1 33.7* 30.9*   * 135 120*     Recent Labs     09/10/22  1323 09/12/22  0250 09/13/22  0605    138 139   K 3.3* 4.5 3.9   CL 99 94* 100   CO2 36* 30* 31*   BUN 10 20 11   CREATININE 0.9 2.0* 1.0   CALCIUM 9.2 9.2 8.2*     Recent Labs     09/10/22  1323 09/12/22  0250 09/13/22  0605   AST 18 20 17   ALT 9 9 8   BILITOT 0.4 0.4 0.3   ALKPHOS 71 73 61     No results for input(s): INR in the last 72 hours.   No results for input(s): CKTOTAL, TROPONINI in the last 72 hours. Assessment/Plan:    Active Hospital Problems    Diagnosis Date Noted    UTI (urinary tract infection) [N39.0] 09/12/2022     Priority: Medium   -Urinary tract infection  -IRMA-resolved  -Bright red blood per rectum  -History of atrial fibrillation  -COPD with chronic CO2 Retention  - oBESE- Class I   -HFpEF  -Moderate mitral regurgitation  -Severe pulmonary hypertension  -Moderate tricuspid regurgitation    PLAN:  -Ceftriaxone 1 g daily  -Urine culture-pending  -Monitor renal function avoid nephrotoxic medications  -Currently IRMA resolved  -Discontinue IV fluids  -Monitor hemoglobin levels  -Transfuse for hemoglobin less than 7.0  -Consider general surgery consult if significant drop in hemoglobin. Currently stable      DVT Prophylaxis: Lovenox  Diet: ADULT DIET; Regular;  Low Sodium (2 gm)  Code Status: Full Code      Dispo -pending urine cultures, monitoring hemoglobin    Rl Liu MD

## 2022-09-13 NOTE — DISCHARGE INSTR - COC
Continuity of Care Form    Patient Name: Chris Fox   :  1955  MRN:  [id number]    Admit date:  2022  Discharge date:  2022      Code Status Order: Full Code   Advance Directives:     Admitting Physician:  Darci Gonzalez DO  PCP: Joseph Mancera MD    Discharging Nurse: Ricco Galan [facility] Unit/Room#: 2665/9429-C  Discharging Unit Phone Number: [phone]    Emergency Contact:   Extended Emergency Contact Information  Primary Emergency Contact: [facility]  Address: [address] [place] [address] Phone: [phone]  Relation: Spouse    Past Surgical History:  Past Surgical History:   Procedure Laterality Date    ABDOMEN SURGERY      ischemic colitis    COLONOSCOPY      healed colitis    COLONOSCOPY  2014    COLONOSCOPY  2015    severe colitis    COLONOSCOPY  10/24/2016    COLONOSCOPY  2017    ECHO COMPL W DOP COLOR FLOW  2015         ECHO COMPLETE  2013         FRACTURE SURGERY  2010    Tibial right    HEMORRHOID SURGERY  2016    KYPHOSIS SURGERY      For comp.  fx T10    LUMBAR DISC SURGERY      OTHER SURGICAL HISTORY  11    removal r leg external fixator    OTHER SURGICAL HISTORY  2021    Partial Vaginectomy, Endometrial Biopsy    SIGMOIDOSCOPY N/A 3/19/2021    SIGMOIDOSCOPY HEMORRHOID BANDING performed by Kleber Macias MD at [address] [place] [address] / [facility]      application TSF  35    UPPER GASTROINTESTINAL ENDOSCOPY  2016    UPPER GASTROINTESTINAL ENDOSCOPY  2017       Immunization History:   Immunization History   Administered Date(s) Administered    COVID-19, MODERNA BLUE border, Primary or Immunocompromised, (age 12y+), IM, 100 mcg/0.5mL 2021, 2021, 2022    INFLUENZA, INTRADERMAL, QUADRIVALENT, PRESERVATIVE FREE 10/03/2019    Influenza 10/26/2011    Influenza A (R3Y2-63) Vaccine PF IM 2009    Influenza Vaccine, unspecified formulation 10/26/2011, 12/11/2013, 09/26/2016, 09/24/2018    Influenza Virus Vaccine 10/01/2010, 09/01/2012, 12/11/2013, 11/27/2015, 10/12/2020    Influenza, AFLURIA (age 1 yrs+), FLUZONE, (age 10 mo+), MDV, 0.5mL 09/26/2016    Influenza, FLUARIX, FLULAVAL, FLUZONE (age 10 mo+) AND AFLURIA, (age 1 y+), PF, 0.5mL 09/24/2018, 10/28/2019, 10/12/2020, 11/04/2021    Influenza, FLUCELVAX, (age 10 mo+), MDCK, MDV, 0.5mL 09/25/2017    Influenza, Intradermal, Preservative free 11/05/2014    MMR 05/13/2019    Pneumococcal Conjugate Vaccine 10/06/2015    Pneumococcal Polysaccharide ([id number]) 06/02/2010, 01/03/2017, 05/19/2022    Tdap (Boostrix, Adacel) 06/02/2010    Zoster Recombinant (Shingrix) 10/12/2020, 10/12/2020, 08/03/2021       Active Problems:  Patient Active Problem List   Diagnosis Code    Chronic back pain M54.9, G89.29    Primary hypothyroidism E03.9    Nephrosclerosis I12.9    Diabetes insipidus, neurohypophyseal (HCC) E23.2    Sarcoidosis D86.9    Steroid-induced avascular necrosis of shoulder ([facility] [address] M87.119, T38.0X5A    Anemia due to chronic illness D63.8    Chronic pain syndrome G89.4    Bilateral hip pain M25.551, M25.552    Debility R53.81    Bright red rectal bleeding K62.5    Hypertensive pulmonary vascular disease I27.20    Benign essential hypertension I10    Chronic kidney disease, stage III (moderate) ([facility]) N18.30    PAF (paroxysmal atrial fibrillation) ([facility]) currently in NSR I48.0    Mixed hyperlipidemia E78.2    Chronic combined systolic and diastolic heart failure ([facility]) I50.42    Chronic respiratory failure with hypoxia ([facility]) J96.11    Mixed simple and mucopurulent chronic bronchitis ([facility]) J41.8    Recurrent major depressive disorder, in partial remission (HCC) F33.41    Gait disturbance R26.9    Chronic, continuous use of opioids F11.90    PMB (postmenopausal bleeding) N95.0    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    Epistaxis R04.0    Ventral hernia without obstruction or gangrene K43.9    Long term (current) use of systemic steroids Z79.52    Confusion R41.0    Acute combined systolic and diastolic CHF, NYHA class 1 (HCC) I50.41    Bilateral pleural effusion J90    Nuclear senile cataract H25.10    UTI (urinary tract infection) N39.0       Isolation/Infection:   Isolation            No Isolation          Patient Infection Status       Infection Onset Added Last Indicated Last Indicated By Review Planned Expiration Resolved Resolved By    None active    Resolved    COVID-19 (Rule Out) 07/15/22 07/15/22 07/15/22 Respiratory Panel, Molecular, with COVID-19 (Restricted: peds pts or suitable admitted adults) (Ordered)   07/15/22 Rule-Out Test Resulted    COVID-19 (Rule Out) 07/14/22 07/14/22 07/14/22 COVID-19, Rapid (Ordered)   07/14/22 Rule-Out Test Resulted    COVID-19 (Rule Out) 07/10/22 07/10/22 07/10/22 COVID-19, Rapid (Ordered)   07/10/22 Rule-Out Test Resulted    COVID-19 (Rule Out) 02/19/22 02/19/22 02/19/22 COVID-19, Rapid (Ordered)   02/19/22 Rule-Out Test Resulted    COVID-19 (Rule Out) 03/15/21 03/15/21 03/15/21 Covid-19 Ambulatory (Ordered)   03/18/21 Rule-Out Test Resulted            Nurse Assessment:  Last Vital Signs: BP (!) 121/94   Pulse 59   Temp 97.9 °F (36.6 °C) (Oral)   Resp 16   Ht 5' (1.524 m)   Wt 165 lb (74.8 kg)   LMP  (LMP Unknown)   SpO2 96%   BMI 32.22 kg/m²     Last documented pain score (0-10 scale): Pain Level: 9  Last Weight:   Wt Readings from Last 1 Encounters:   09/12/22 165 lb (74.8 kg)     Mental Status:  oriented, alert, thought processes intact, and able to concentrate and follow conversation    IV Access:  - None    Nursing Mobility/ADLs:  Walking   Assisted  Transfer  Assisted  Bathing  Assisted  Dressing  Assisted  Toileting  Assisted  Feeding  Independent  Med Admin  Independent  Med Delivery   none    Wound Care Documentation and Therapy:  Wound 07/10/22 Abdomen Medial (Active)   Dressing Status Intact 09/13/22 1012   Wound Cleansed sent with patient):  Glasses, Dentures upper and lower     RN SIGNATURE:  Electronically signed by Carissa Jett RN on 9/27/22 at 1:16 PM EDT    CASE MANAGEMENT/SOCIAL WORK SECTION    Inpatient Status Date: 09/12/2022    Readmission Risk Assessment Score:  Readmission Risk              Risk of Unplanned Readmission:  35           Discharging to Facility/ Agency   Name: Ting Kaplan at [facility]  Address: [address] [place] [address], [facility], [facility] [address]  Phone: [phone]  Fax:    Dialysis Facility (if applicable)   Name:  Address:  Dialysis Schedule:  Phone:  Fax:    / signature: Electronically signed by JIGAR Fernandez on 9/26/2022 at 10:07 AM     PHYSICIAN SECTION    Prognosis: {Prognosis:9982400166}    Condition at Discharge: Jose Kaplan Patient Condition:[id number]}    Rehab Potential (if transferring to Rehab): {Prognosis:2202486017}    Recommended Labs or Other Treatments After Discharge: ***    Physician Certification: I certify the above information and transfer of Crissie Soulier  is necessary for the continuing treatment of the diagnosis listed and that she requires Home Care for less 30 days.      Update Admission H&P: {CHP DME Changes in CWLXK:[id number]}    PHYSICIAN SIGNATURE:  {Esignature:[id number]}

## 2022-09-14 LAB
ANION GAP SERPL CALCULATED.3IONS-SCNC: 12 MMOL/L (ref 7–16)
ANION GAP SERPL CALCULATED.3IONS-SCNC: 12 MMOL/L (ref 7–16)
ANISOCYTOSIS: ABNORMAL
BASOPHILS ABSOLUTE: 0.03 E9/L (ref 0–0.2)
BASOPHILS RELATIVE PERCENT: 0.9 % (ref 0–2)
BUN BLDV-MCNC: 8 MG/DL (ref 6–23)
BUN BLDV-MCNC: 8 MG/DL (ref 6–23)
C-REACTIVE PROTEIN: 5.8 MG/DL (ref 0–0.4)
CALCIUM SERPL-MCNC: 8.6 MG/DL (ref 8.6–10.2)
CALCIUM SERPL-MCNC: 8.7 MG/DL (ref 8.6–10.2)
CHLORIDE BLD-SCNC: 97 MMOL/L (ref 98–107)
CHLORIDE BLD-SCNC: 97 MMOL/L (ref 98–107)
CO2: 30 MMOL/L (ref 22–29)
CO2: 31 MMOL/L (ref 22–29)
CREAT SERPL-MCNC: 0.9 MG/DL (ref 0.5–1)
CREAT SERPL-MCNC: 0.9 MG/DL (ref 0.5–1)
EOSINOPHILS ABSOLUTE: 0.15 E9/L (ref 0.05–0.5)
EOSINOPHILS RELATIVE PERCENT: 4.3 % (ref 0–6)
GFR AFRICAN AMERICAN: >60
GFR AFRICAN AMERICAN: >60
GFR NON-AFRICAN AMERICAN: >60 ML/MIN/1.73
GFR NON-AFRICAN AMERICAN: >60 ML/MIN/1.73
GLUCOSE BLD-MCNC: 114 MG/DL (ref 74–99)
GLUCOSE BLD-MCNC: 91 MG/DL (ref 74–99)
HCT VFR BLD CALC: 34 % (ref 34–48)
HEMOGLOBIN: 10 G/DL (ref 11.5–15.5)
LACTIC ACID: 1.5 MMOL/L (ref 0.5–2.2)
LYMPHOCYTES ABSOLUTE: 0.48 E9/L (ref 1.5–4)
LYMPHOCYTES RELATIVE PERCENT: 13.9 % (ref 20–42)
MCH RBC QN AUTO: 25.4 PG (ref 26–35)
MCHC RBC AUTO-ENTMCNC: 29.4 % (ref 32–34.5)
MCV RBC AUTO: 86.3 FL (ref 80–99.9)
MONOCYTES ABSOLUTE: 0.37 E9/L (ref 0.1–0.95)
MONOCYTES RELATIVE PERCENT: 11.3 % (ref 2–12)
NEUTROPHILS ABSOLUTE: 2.38 E9/L (ref 1.8–7.3)
NEUTROPHILS RELATIVE PERCENT: 69.6 % (ref 43–80)
OVALOCYTES: ABNORMAL
PDW BLD-RTO: 14.7 FL (ref 11.5–15)
PLATELET # BLD: 138 E9/L (ref 130–450)
PMV BLD AUTO: 10.6 FL (ref 7–12)
POIKILOCYTES: ABNORMAL
POLYCHROMASIA: ABNORMAL
POTASSIUM REFLEX MAGNESIUM: 3.6 MMOL/L (ref 3.5–5)
POTASSIUM REFLEX MAGNESIUM: 3.8 MMOL/L (ref 3.5–5)
PROCALCITONIN: 0.09 NG/ML (ref 0–0.08)
RBC # BLD: 3.94 E12/L (ref 3.5–5.5)
SODIUM BLD-SCNC: 139 MMOL/L (ref 132–146)
SODIUM BLD-SCNC: 140 MMOL/L (ref 132–146)
URINE CULTURE, ROUTINE: NORMAL
WBC # BLD: 3.4 E9/L (ref 4.5–11.5)

## 2022-09-14 PROCEDURE — 84145 PROCALCITONIN (PCT): CPT

## 2022-09-14 PROCEDURE — 36415 COLL VENOUS BLD VENIPUNCTURE: CPT

## 2022-09-14 PROCEDURE — 86140 C-REACTIVE PROTEIN: CPT

## 2022-09-14 PROCEDURE — 6360000002 HC RX W HCPCS: Performed by: FAMILY MEDICINE

## 2022-09-14 PROCEDURE — 2580000003 HC RX 258: Performed by: STUDENT IN AN ORGANIZED HEALTH CARE EDUCATION/TRAINING PROGRAM

## 2022-09-14 PROCEDURE — 6370000000 HC RX 637 (ALT 250 FOR IP): Performed by: FAMILY MEDICINE

## 2022-09-14 PROCEDURE — 2700000000 HC OXYGEN THERAPY PER DAY

## 2022-09-14 PROCEDURE — 1200000000 HC SEMI PRIVATE

## 2022-09-14 PROCEDURE — 94640 AIRWAY INHALATION TREATMENT: CPT

## 2022-09-14 PROCEDURE — 83605 ASSAY OF LACTIC ACID: CPT

## 2022-09-14 PROCEDURE — 2580000003 HC RX 258: Performed by: FAMILY MEDICINE

## 2022-09-14 PROCEDURE — 85025 COMPLETE CBC W/AUTO DIFF WBC: CPT

## 2022-09-14 PROCEDURE — 80048 BASIC METABOLIC PNL TOTAL CA: CPT

## 2022-09-14 PROCEDURE — 6360000002 HC RX W HCPCS: Performed by: STUDENT IN AN ORGANIZED HEALTH CARE EDUCATION/TRAINING PROGRAM

## 2022-09-14 PROCEDURE — 6370000000 HC RX 637 (ALT 250 FOR IP): Performed by: STUDENT IN AN ORGANIZED HEALTH CARE EDUCATION/TRAINING PROGRAM

## 2022-09-14 RX ORDER — OXYCODONE HYDROCHLORIDE AND ACETAMINOPHEN 5; 325 MG/1; MG/1
2 TABLET ORAL EVERY 4 HOURS PRN
Status: DISCONTINUED | OUTPATIENT
Start: 2022-09-14 | End: 2022-09-27 | Stop reason: HOSPADM

## 2022-09-14 RX ORDER — BENZONATATE 100 MG/1
100 CAPSULE ORAL 3 TIMES DAILY PRN
Status: DISCONTINUED | OUTPATIENT
Start: 2022-09-14 | End: 2022-09-15

## 2022-09-14 RX ADMIN — FUROSEMIDE 40 MG: 40 TABLET ORAL at 09:31

## 2022-09-14 RX ADMIN — ESCITALOPRAM OXALATE 10 MG: 10 TABLET ORAL at 09:31

## 2022-09-14 RX ADMIN — FERROUS SULFATE TAB 325 MG (65 MG ELEMENTAL FE) 325 MG: 325 (65 FE) TAB at 16:48

## 2022-09-14 RX ADMIN — FERROUS SULFATE TAB 325 MG (65 MG ELEMENTAL FE) 325 MG: 325 (65 FE) TAB at 09:31

## 2022-09-14 RX ADMIN — DESMOPRESSIN ACETATE 200 MCG: 0.1 TABLET ORAL at 20:40

## 2022-09-14 RX ADMIN — FAMOTIDINE 20 MG: 20 TABLET ORAL at 09:31

## 2022-09-14 RX ADMIN — ARFORMOTEROL TARTRATE 15 MCG: 15 SOLUTION RESPIRATORY (INHALATION) at 09:48

## 2022-09-14 RX ADMIN — DILTIAZEM HYDROCHLORIDE 30 MG: 30 TABLET, FILM COATED ORAL at 09:31

## 2022-09-14 RX ADMIN — OXYCODONE AND ACETAMINOPHEN 2 TABLET: 5; 325 TABLET ORAL at 21:13

## 2022-09-14 RX ADMIN — WATER 1000 MG: 1 INJECTION INTRAMUSCULAR; INTRAVENOUS; SUBCUTANEOUS at 10:09

## 2022-09-14 RX ADMIN — LEVOTHYROXINE SODIUM 75 MCG: 0.07 TABLET ORAL at 05:48

## 2022-09-14 RX ADMIN — OXYCODONE AND ACETAMINOPHEN 1 TABLET: 5; 325 TABLET ORAL at 05:48

## 2022-09-14 RX ADMIN — BUDESONIDE 250 MCG: 0.25 SUSPENSION RESPIRATORY (INHALATION) at 20:04

## 2022-09-14 RX ADMIN — ENOXAPARIN SODIUM 40 MG: 100 INJECTION SUBCUTANEOUS at 09:31

## 2022-09-14 RX ADMIN — BUDESONIDE 250 MCG: 0.25 SUSPENSION RESPIRATORY (INHALATION) at 09:48

## 2022-09-14 RX ADMIN — DILTIAZEM HYDROCHLORIDE 30 MG: 30 TABLET, FILM COATED ORAL at 13:45

## 2022-09-14 RX ADMIN — DESMOPRESSIN ACETATE 200 MCG: 0.1 TABLET ORAL at 09:32

## 2022-09-14 RX ADMIN — Medication 10 ML: at 09:34

## 2022-09-14 RX ADMIN — METOPROLOL SUCCINATE 100 MG: 100 TABLET, FILM COATED, EXTENDED RELEASE ORAL at 09:32

## 2022-09-14 RX ADMIN — OXYCODONE AND ACETAMINOPHEN 2 TABLET: 5; 325 TABLET ORAL at 16:49

## 2022-09-14 RX ADMIN — OXYCODONE AND ACETAMINOPHEN 2 TABLET: 5; 325 TABLET ORAL at 11:46

## 2022-09-14 RX ADMIN — ESCITALOPRAM OXALATE 10 MG: 10 TABLET ORAL at 20:40

## 2022-09-14 RX ADMIN — PREDNISONE 5 MG: 5 TABLET ORAL at 09:30

## 2022-09-14 RX ADMIN — DILTIAZEM HYDROCHLORIDE 30 MG: 30 TABLET, FILM COATED ORAL at 20:40

## 2022-09-14 RX ADMIN — DOCUSATE SODIUM 100 MG: 100 CAPSULE, LIQUID FILLED ORAL at 09:31

## 2022-09-14 RX ADMIN — Medication 10 ML: at 20:44

## 2022-09-14 RX ADMIN — ARFORMOTEROL TARTRATE 15 MCG: 15 SOLUTION RESPIRATORY (INHALATION) at 20:03

## 2022-09-14 ASSESSMENT — ENCOUNTER SYMPTOMS
COUGH: 0
BLOOD IN STOOL: 1
NAUSEA: 0
SHORTNESS OF BREATH: 0
CONSTIPATION: 0
VOMITING: 0
BACK PAIN: 0
CHEST TIGHTNESS: 0
WHEEZING: 0
ABDOMINAL PAIN: 1
ANAL BLEEDING: 0
DIARRHEA: 0

## 2022-09-14 ASSESSMENT — PAIN SCALES - GENERAL
PAINLEVEL_OUTOF10: 10
PAINLEVEL_OUTOF10: 10
PAINLEVEL_OUTOF10: 7
PAINLEVEL_OUTOF10: 8

## 2022-09-14 ASSESSMENT — PAIN DESCRIPTION - ORIENTATION
ORIENTATION: LEFT
ORIENTATION: LEFT

## 2022-09-14 ASSESSMENT — PAIN DESCRIPTION - LOCATION
LOCATION: ABDOMEN
LOCATION: OTHER (COMMENT)

## 2022-09-14 NOTE — PROGRESS NOTES
Hospitalist Progress Note      PCP: Jaida Carrera MD    Date of Admission: 9/12/2022    Chief Complaint: Bright red blood per rectum, UTI, IRMA    Hospital Course:   Patient presented to the emergency department with abdominal pain and bright red blood per rectum. ER physician noted bright red streak of blood on rectal exam.  Vital signs within normal limits and stable. Laboratory studies demonstrate BUN 20, creatinine 2.0, hemoglobin 10.1. Urinalysis possible urinary tract infection. Patient given a dose of ceftriaxone. Given IV fluids for acute renal failure. Medicine consulted for admission. Subjective:   Patient was seen at bedside at this morning and mentions that she has not noticed any blood in her stools but mentions having diarrhea with BM every 2hrs. Feels tired, ordered BMP and consider fluids. Decreased appeitie, no nausea. LLQ pain and tenderness.  Will consider repeat CT abdomen if persists    Medications:  Reviewed    Infusion Medications    sodium chloride       Scheduled Medications    desmopressin  200 mcg Oral BID    dilTIAZem  30 mg Oral TID    docusate sodium  100 mg Oral BID    escitalopram  10 mg Oral BID    famotidine  20 mg Oral Daily    ferrous sulfate  325 mg Oral BID WC    furosemide  40 mg Oral Once per day on Mon Wed Fri    levothyroxine  75 mcg Oral QAM AC    metoprolol succinate  100 mg Oral Daily    predniSONE  5 mg Oral Daily    budesonide  0.25 mg Nebulization BID    And    Arformoterol Tartrate  15 mcg Nebulization BID    cefTRIAXone (ROCEPHIN) IV  1,000 mg IntraVENous Q24H    enoxaparin  40 mg SubCUTAneous Daily    sodium chloride flush  10 mL IntraVENous 2 times per day     PRN Meds: oxyCODONE-acetaminophen, albuterol, sodium chloride flush, sodium chloride, promethazine **OR** ondansetron, polyethylene glycol, acetaminophen **OR** acetaminophen      Intake/Output Summary (Last 24 hours) at 9/14/2022 1401  Last data filed at 9/14/2022 0622  Gross per 24 hour   Intake 480 ml   Output --   Net 480 ml       Exam:    BP (!) 135/94   Pulse 75   Temp 97.2 °F (36.2 °C) (Temporal)   Resp 16   Ht 5' (1.524 m)   Wt 176 lb 3.2 oz (79.9 kg)   LMP  (LMP Unknown)   SpO2 96%   BMI 34.41 kg/m²     General appearance: Obese, appears stated age and cooperative. HEENT: Pupils equal, round, and reactive to light. Conjunctivae/corneas clear. Neck: Supple, with full range of motion. No jugular venous distention. Trachea midline. Respiratory:  Normal respiratory effort. Clear to auscultation, bilaterally without Rales/Wheezes/Rhonchi. Cardiovascular: Regular rate and rhythm with normal S1/S2 without murmurs, rubs or gallops. Abdomen: Soft, non-tender, non-distended with normal bowel sounds. hernia noted with protruding mass on the left side of the abdomen, Mild tenderness to palpation  Musculoskeletal: No clubbing, cyanosis or edema bilaterally. Full range of motion without deformity. Skin: Skin color, texture, turgor normal.  No rashes or lesions. Left-sided abdominal mass/hernia with skin ulceration oblique-chronic nonhealing- does not look infected  Neurologic: No focal deficit, alert and oriented      Labs:   Recent Labs     09/12/22  0250 09/13/22  0605 09/13/22  1717 09/14/22  1047   WBC 6.3 3.9*  --  3.4*   HGB 10.1* 9.4* 10.0* 10.0*   HCT 33.7* 30.9* 33.7* 34.0    120*  --  138     Recent Labs     09/12/22  0250 09/13/22  0605    139   K 4.5 3.9   CL 94* 100   CO2 30* 31*   BUN 20 11   CREATININE 2.0* 1.0   CALCIUM 9.2 8.2*     Recent Labs     09/12/22  0250 09/13/22  0605   AST 20 17   ALT 9 8   BILITOT 0.4 0.3   ALKPHOS 73 61     No results for input(s): INR in the last 72 hours. No results for input(s): Alvarez Pears in the last 72 hours.     Assessment/Plan:    Active Hospital Problems    Diagnosis Date Noted    UTI (urinary tract infection) [N39.0] 09/12/2022     Priority: Medium   -Urinary tract infection  -IRMA-resolved  -Bright red blood per rectum  -History of atrial fibrillation  -COPD with chronic CO2 Retention  - oBESE- Class I   -HFpEF  -Moderate mitral regurgitation  -Severe pulmonary hypertension  -Moderate tricuspid regurgitation    PLAN:  -Ceftriaxone 1 g daily  -Urine culture-neg-<10,0000  -Monitor renal function avoid nephrotoxic medications  -Currently IRMA resolved  -Discontinue IV fluids  -Monitor hemoglobin levels- Currently at 10.0  -Transfuse for hemoglobin less than 7.0  -Consider general surgery consult if significant drop in hemoglobin. Currently stable      DVT Prophylaxis: Lovenox  Diet: ADULT DIET; Regular;  Low Sodium (2 gm)  Code Status: Full Code      Dispo -pending clinical improvement- diarrhea,  monitoring hemoglobin    Shira Romo MD

## 2022-09-14 NOTE — CARE COORDINATION
9/14 Update CM note. Rocephin IV Q24H continues for UTI. Hgb remains stable, 10.0 today. Per Dr. Mannie Valera, pt endorsed diarrhea Q2H associated with decreased appetite. Note states possible repeat CT AP if problem persists. Plan remains to return home on discharge with [facility]. PA orders in place.     SHARON IvoryN, RN  [facility] Case Management   Cell: [phone]

## 2022-09-15 ENCOUNTER — APPOINTMENT (OUTPATIENT)
Dept: CT IMAGING | Age: 67
DRG: 682 | End: 2022-09-15
Payer: MEDICARE

## 2022-09-15 LAB
ANION GAP SERPL CALCULATED.3IONS-SCNC: 15 MMOL/L (ref 7–16)
ANISOCYTOSIS: ABNORMAL
BASOPHILS ABSOLUTE: 0 E9/L (ref 0–0.2)
BASOPHILS RELATIVE PERCENT: 0.5 % (ref 0–2)
BUN BLDV-MCNC: 10 MG/DL (ref 6–23)
CALCIUM SERPL-MCNC: 8.6 MG/DL (ref 8.6–10.2)
CHLORIDE BLD-SCNC: 98 MMOL/L (ref 98–107)
CO2: 28 MMOL/L (ref 22–29)
CREAT SERPL-MCNC: 1.2 MG/DL (ref 0.5–1)
EOSINOPHILS ABSOLUTE: 0.34 E9/L (ref 0.05–0.5)
EOSINOPHILS RELATIVE PERCENT: 7.8 % (ref 0–6)
GFR AFRICAN AMERICAN: 54
GFR NON-AFRICAN AMERICAN: 54 ML/MIN/1.73
GLUCOSE BLD-MCNC: 111 MG/DL (ref 74–99)
HCT VFR BLD CALC: 33.6 % (ref 34–48)
HEMOGLOBIN: 9.7 G/DL (ref 11.5–15.5)
LYMPHOCYTES ABSOLUTE: 0.65 E9/L (ref 1.5–4)
LYMPHOCYTES RELATIVE PERCENT: 14.8 % (ref 20–42)
MAGNESIUM: 2 MG/DL (ref 1.6–2.6)
MCH RBC QN AUTO: 25.1 PG (ref 26–35)
MCHC RBC AUTO-ENTMCNC: 28.9 % (ref 32–34.5)
MCV RBC AUTO: 87 FL (ref 80–99.9)
METAMYELOCYTES RELATIVE PERCENT: 0.9 % (ref 0–1)
MONOCYTES ABSOLUTE: 0.34 E9/L (ref 0.1–0.95)
MONOCYTES RELATIVE PERCENT: 7.8 % (ref 2–12)
NEUTROPHILS ABSOLUTE: 3.01 E9/L (ref 1.8–7.3)
NEUTROPHILS RELATIVE PERCENT: 68.7 % (ref 43–80)
PDW BLD-RTO: 14.9 FL (ref 11.5–15)
PLATELET # BLD: 155 E9/L (ref 130–450)
PMV BLD AUTO: 10.6 FL (ref 7–12)
POIKILOCYTES: ABNORMAL
POLYCHROMASIA: ABNORMAL
POTASSIUM REFLEX MAGNESIUM: 3.4 MMOL/L (ref 3.5–5)
RBC # BLD: 3.86 E12/L (ref 3.5–5.5)
SODIUM BLD-SCNC: 141 MMOL/L (ref 132–146)
WBC # BLD: 4.3 E9/L (ref 4.5–11.5)

## 2022-09-15 PROCEDURE — 2580000003 HC RX 258: Performed by: FAMILY MEDICINE

## 2022-09-15 PROCEDURE — 1200000000 HC SEMI PRIVATE

## 2022-09-15 PROCEDURE — 6360000002 HC RX W HCPCS: Performed by: STUDENT IN AN ORGANIZED HEALTH CARE EDUCATION/TRAINING PROGRAM

## 2022-09-15 PROCEDURE — 2580000003 HC RX 258: Performed by: STUDENT IN AN ORGANIZED HEALTH CARE EDUCATION/TRAINING PROGRAM

## 2022-09-15 PROCEDURE — 6360000002 HC RX W HCPCS: Performed by: FAMILY MEDICINE

## 2022-09-15 PROCEDURE — 6370000000 HC RX 637 (ALT 250 FOR IP): Performed by: FAMILY MEDICINE

## 2022-09-15 PROCEDURE — 6370000000 HC RX 637 (ALT 250 FOR IP): Performed by: STUDENT IN AN ORGANIZED HEALTH CARE EDUCATION/TRAINING PROGRAM

## 2022-09-15 PROCEDURE — 83735 ASSAY OF MAGNESIUM: CPT

## 2022-09-15 PROCEDURE — 6360000004 HC RX CONTRAST MEDICATION: Performed by: RADIOLOGY

## 2022-09-15 PROCEDURE — 2700000000 HC OXYGEN THERAPY PER DAY

## 2022-09-15 PROCEDURE — 85025 COMPLETE CBC W/AUTO DIFF WBC: CPT

## 2022-09-15 PROCEDURE — 80048 BASIC METABOLIC PNL TOTAL CA: CPT

## 2022-09-15 PROCEDURE — 74177 CT ABD & PELVIS W/CONTRAST: CPT

## 2022-09-15 PROCEDURE — 36415 COLL VENOUS BLD VENIPUNCTURE: CPT

## 2022-09-15 PROCEDURE — 94640 AIRWAY INHALATION TREATMENT: CPT

## 2022-09-15 RX ORDER — SODIUM CHLORIDE 9 MG/ML
INJECTION, SOLUTION INTRAVENOUS CONTINUOUS
Status: DISCONTINUED | OUTPATIENT
Start: 2022-09-15 | End: 2022-09-16

## 2022-09-15 RX ORDER — LOPERAMIDE HYDROCHLORIDE 2 MG/1
2 CAPSULE ORAL 4 TIMES DAILY PRN
Status: DISCONTINUED | OUTPATIENT
Start: 2022-09-15 | End: 2022-09-27 | Stop reason: HOSPADM

## 2022-09-15 RX ORDER — PROMETHAZINE HYDROCHLORIDE AND CODEINE PHOSPHATE 6.25; 1 MG/5ML; MG/5ML
5 SOLUTION ORAL EVERY 4 HOURS PRN
Status: DISCONTINUED | OUTPATIENT
Start: 2022-09-15 | End: 2022-09-27 | Stop reason: HOSPADM

## 2022-09-15 RX ADMIN — BUDESONIDE 250 MCG: 0.25 SUSPENSION RESPIRATORY (INHALATION) at 08:17

## 2022-09-15 RX ADMIN — DESMOPRESSIN ACETATE 200 MCG: 0.1 TABLET ORAL at 09:58

## 2022-09-15 RX ADMIN — PREDNISONE 5 MG: 5 TABLET ORAL at 09:58

## 2022-09-15 RX ADMIN — ESCITALOPRAM OXALATE 10 MG: 10 TABLET ORAL at 09:58

## 2022-09-15 RX ADMIN — DESMOPRESSIN ACETATE 200 MCG: 0.1 TABLET ORAL at 21:09

## 2022-09-15 RX ADMIN — METOPROLOL SUCCINATE 100 MG: 100 TABLET, FILM COATED, EXTENDED RELEASE ORAL at 09:58

## 2022-09-15 RX ADMIN — OXYCODONE AND ACETAMINOPHEN 2 TABLET: 5; 325 TABLET ORAL at 02:00

## 2022-09-15 RX ADMIN — FERROUS SULFATE TAB 325 MG (65 MG ELEMENTAL FE) 325 MG: 325 (65 FE) TAB at 09:58

## 2022-09-15 RX ADMIN — OXYCODONE AND ACETAMINOPHEN 2 TABLET: 5; 325 TABLET ORAL at 16:39

## 2022-09-15 RX ADMIN — DILTIAZEM HYDROCHLORIDE 30 MG: 30 TABLET, FILM COATED ORAL at 09:58

## 2022-09-15 RX ADMIN — BUDESONIDE 250 MCG: 0.25 SUSPENSION RESPIRATORY (INHALATION) at 20:17

## 2022-09-15 RX ADMIN — FAMOTIDINE 20 MG: 20 TABLET ORAL at 09:58

## 2022-09-15 RX ADMIN — ESCITALOPRAM OXALATE 10 MG: 10 TABLET ORAL at 21:08

## 2022-09-15 RX ADMIN — OXYCODONE AND ACETAMINOPHEN 2 TABLET: 5; 325 TABLET ORAL at 21:08

## 2022-09-15 RX ADMIN — Medication 10 ML: at 21:08

## 2022-09-15 RX ADMIN — ARFORMOTEROL TARTRATE 15 MCG: 15 SOLUTION RESPIRATORY (INHALATION) at 08:17

## 2022-09-15 RX ADMIN — LOPERAMIDE HYDROCHLORIDE 2 MG: 2 CAPSULE ORAL at 16:38

## 2022-09-15 RX ADMIN — LEVOTHYROXINE SODIUM 75 MCG: 0.07 TABLET ORAL at 06:08

## 2022-09-15 RX ADMIN — Medication 5 ML: at 21:38

## 2022-09-15 RX ADMIN — ARFORMOTEROL TARTRATE 15 MCG: 15 SOLUTION RESPIRATORY (INHALATION) at 20:18

## 2022-09-15 RX ADMIN — DOCUSATE SODIUM 100 MG: 100 CAPSULE, LIQUID FILLED ORAL at 09:58

## 2022-09-15 RX ADMIN — IOHEXOL 50 ML: 240 INJECTION, SOLUTION INTRATHECAL; INTRAVASCULAR; INTRAVENOUS; ORAL at 13:26

## 2022-09-15 RX ADMIN — OXYCODONE AND ACETAMINOPHEN 2 TABLET: 5; 325 TABLET ORAL at 06:08

## 2022-09-15 RX ADMIN — SODIUM CHLORIDE: 9 INJECTION, SOLUTION INTRAVENOUS at 21:12

## 2022-09-15 RX ADMIN — DILTIAZEM HYDROCHLORIDE 30 MG: 30 TABLET, FILM COATED ORAL at 21:08

## 2022-09-15 RX ADMIN — WATER 1000 MG: 1 INJECTION INTRAMUSCULAR; INTRAVENOUS; SUBCUTANEOUS at 10:02

## 2022-09-15 RX ADMIN — DILTIAZEM HYDROCHLORIDE 30 MG: 30 TABLET, FILM COATED ORAL at 16:38

## 2022-09-15 RX ADMIN — FERROUS SULFATE TAB 325 MG (65 MG ELEMENTAL FE) 325 MG: 325 (65 FE) TAB at 16:38

## 2022-09-15 ASSESSMENT — PAIN SCALES - GENERAL
PAINLEVEL_OUTOF10: 8
PAINLEVEL_OUTOF10: 10
PAINLEVEL_OUTOF10: 10
PAINLEVEL_OUTOF10: 8

## 2022-09-15 ASSESSMENT — PAIN - FUNCTIONAL ASSESSMENT: PAIN_FUNCTIONAL_ASSESSMENT: PREVENTS OR INTERFERES SOME ACTIVE ACTIVITIES AND ADLS

## 2022-09-15 ASSESSMENT — PAIN DESCRIPTION - DESCRIPTORS: DESCRIPTORS: ACHING

## 2022-09-15 ASSESSMENT — PAIN DESCRIPTION - ORIENTATION: ORIENTATION: MID

## 2022-09-15 ASSESSMENT — PAIN DESCRIPTION - LOCATION: LOCATION: ABDOMEN

## 2022-09-15 NOTE — FLOWSHEET NOTE
Inpatient Wound Care (Initial consult) [id number]    Admit Date: 9/12/2022  1:08 AM    Reason for consult:  Abdominal wall wound    Significant history:  Per H&P    Chief Complaint:  had concerns including Rectal Bleeding (Bright red rectal bleeding started few hours ago. Pt at [address] yesterday for umbilical hernia    Findings:     09/15/22 1333   Skin Integumentary    Skin Integrity   (dry flaky)   Location bilateral feet   Wound 07/10/22 Abdomen Medial   Date First Assessed/Time First Assessed: 07/10/22 2000   Present on Hospital Admission: Yes  Location: Abdomen  Wound Location Orientation: Medial   Wound Image    Wound Etiology Non-Healing Surgical   Dressing Status New dressing applied   Wound Cleansed Cleansed with saline   Dressing/Treatment ABD; Alginate   Wound Length (cm) 7.8 cm   Wound Width (cm) 3 cm   Wound Depth (cm) 0.1 cm   Wound Surface Area (cm^2) 23.4 cm^2   Change in Wound Size % (l*w) 41.5   Wound Volume (cm^3) 2.34 cm^3   Wound Healing % 81   Wound Assessment Pink/red   Drainage Amount Moderate   Drainage Description Yellow   Odor None   Kaitlyn-wound Assessment Dry/flaky     **Informed Consent**    The patient has given verbal consent to have photos taken of wound and inserted into their chart as part of their permanent medical record for purposes of documentation, treatment management and/or medical review. All Images taken on 9/15/22 of patient name: Saniya Das were transmitted and stored on secured M.dot located within [facility] by a registered [employer] Mobile Application Device. Impression:  Mid abdomen: non healing surgical    Plan: Opticell, 4x4, abd pad  Heel protectors  [place] [address]  Patient will need continued preventative care.       Claudene Clayman, RN 9/15/2022 1:34 PM

## 2022-09-15 NOTE — PROGRESS NOTES
Hospitalist Progress Note      PCP: Josselyn Barraza MD    Date of Admission: 9/12/2022    Chief Complaint: Bright red blood per rectum, UTI, IRMA    Hospital Course:   Patient presented to the emergency department with abdominal pain and bright red blood per rectum. ER physician noted bright red streak of blood on rectal exam.  Vital signs within normal limits and stable. Laboratory studies demonstrate BUN 20, creatinine 2.0, hemoglobin 10.1. Urinalysis possible urinary tract infection. Patient given a dose of ceftriaxone. Given IV fluids for acute renal failure. Medicine consulted for admission. Subjective:   Patient was seen at bedside at this morning and mentions that she has not noticed any blood in her stools but mentions that she has been having persistent diarrhea which has improved slightly but still has been having left lower quadrant abdominal pain as well . Repeat imaging was discussed, will consider anti-diarrheal meds.    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    Medications:  Reviewed    Infusion Medications    sodium chloride       Scheduled Medications    desmopressin  200 mcg Oral BID    dilTIAZem  30 mg Oral TID    docusate sodium  100 mg Oral BID    escitalopram  10 mg Oral BID    famotidine  20 mg Oral Daily    ferrous sulfate  325 mg Oral BID WC    furosemide  40 mg Oral Once per day on Mon Wed Fri    levothyroxine  75 mcg Oral QAM AC    metoprolol succinate  100 mg Oral Daily    predniSONE  5 mg Oral Daily    budesonide  0.25 mg Nebulization BID    And    Arformoterol Tartrate  15 mcg Nebulization BID    cefTRIAXone (ROCEPHIN) IV  1,000 mg IntraVENous Q24H    enoxaparin  40 mg SubCUTAneous Daily    sodium chloride flush  10 mL IntraVENous 2 times per day     PRN Meds: oxyCODONE-acetaminophen, benzonatate, albuterol, sodium chloride flush, sodium chloride, promethazine **OR** ondansetron, polyethylene glycol, acetaminophen **OR** acetaminophen    No intake or output data in the 24 hours ending 09/15/22 1044      Exam:    BP (!) 131/92   Pulse 75   Temp 98.4 °F (36.9 °C) (Oral)   Resp 16   Ht 5' (1.524 m)   Wt 180 lb (81.6 kg)   LMP  (LMP Unknown)   SpO2 92%   BMI 35.15 kg/m²     General appearance: Obese, appears stated age and cooperative. HEENT: Pupils equal, round, and reactive to light. Conjunctivae/corneas clear. Neck: Supple, with full range of motion. No jugular venous distention. Trachea midline. Respiratory:  Normal respiratory effort. Clear to auscultation, bilaterally without Rales/Wheezes/Rhonchi. Cardiovascular: Regular rate and rhythm with normal S1/S2 without murmurs, rubs or gallops. Abdomen: Soft, non-tender, non-distended with normal bowel sounds. hernia noted with protruding mass on the left side of the abdomen, Mild tenderness to palpation-LLQ  Musculoskeletal: No clubbing, cyanosis or edema bilaterally. Full range of motion without deformity. Skin: Skin color, texture, turgor normal.  No rashes or lesions. Left-sided abdominal mass/hernia with skin ulceration oblique-chronic nonhealing- does not look infected  Neurologic: No focal deficit, alert and oriented      Labs:   Recent Labs     09/13/22  0605 09/13/22  1717 09/14/22  1047   WBC 3.9*  --  3.4*   HGB 9.4* 10.0* 10.0*   HCT 30.9* 33.7* 34.0   *  --  138     Recent Labs     09/13/22  0605 09/14/22  1047 09/14/22  1604    139 140   K 3.9 3.8 3.6    97* 97*   CO2 31* 30* 31*   BUN 11 8 8   CREATININE 1.0 0.9 0.9   CALCIUM 8.2* 8.6 8.7     Recent Labs     09/13/22  0605   AST 17   ALT 8   BILITOT 0.3   ALKPHOS 61     No results for input(s): INR in the last 72 hours. No results for input(s): Esdras Begin in the last 72 hours.     Assessment/Plan:    Active Hospital Problems    Diagnosis Date Noted    UTI (urinary tract infection) [N39.0] 09/12/2022     Priority: Medium   -Urinary tract infection  -IRMA-resolved  -Bright red blood per rectum  -History of atrial fibrillation  -COPD with chronic CO2 Retention  - oBESE- Class I   -HFpEF  -Moderate mitral regurgitation  -Severe pulmonary hypertension  -Moderate tricuspid regurgitation    PLAN:  -Ceftriaxone 1 g daily  -Urine culture-neg-<10,0000  -Monitor renal function avoid nephrotoxic medications  -Currently IRMA resolved-Discontinue IV fluids  -Monitor hemoglobin levels- Currently at 10.0, awaiting labs from this morning  -Persistent abdominal pain along with diarrhea-CT abdomen ordered. Procalcitonin from yesterday-within normal limits  -Transfuse for hemoglobin less than 7.0  -Consider general surgery consult if significant drop in hemoglobin. Currently stable      DVT Prophylaxis: Lovenox  Diet: ADULT DIET; Regular;  Low Sodium (2 gm)  Code Status: Full Code      Dispo -pending clinical improvement- diarrhea,  monitoring hemoglobin    Jose Mullen MD

## 2022-09-15 NOTE — DISCHARGE INSTRUCTIONS
Your information:  Name: Quentin Joyce  : 1955    Your instructions:    Home Care for dressing change:    Mid abdomen: clean with normal saline, apply opticell then cover with 4x4, abd pad. Change dressing daily    What to do after you leave the hospital:    Recommended diet: {diet:62388}    Recommended activity: {discharge activity:83909}        The following personal items were collected during your admission and were returned to you:    Belongings  Dental Appliances: Uppers, Lowers, At home  Vision - Corrective Lenses: Eyeglasses, At home  Hearing Aid: None  Clothing: Shirt, Pants, Undergarments  Jewelry: Ring  Body Piercings Removed: N/A  Electronic Devices: Cell Phone,   Weapons (Notify Protective Services/Security): None  Other Valuables: At home  Home Medications: None  Valuables Given To: Patient  Provide Name(s) of Who Valuable(s) Were Given To: na  Responsible person(s) in the waiting room: na  Patient approves for provider to speak to responsible person post operatively: Yes    Information obtained by:  By signing below, I understand that if any problems occur once I leave the hospital I am to contact ***. I understand and acknowledge receipt of the instructions indicated above. ***HEART FAILURE - CONGESTIVE HEART FAILURE***  DISCHARGE INSTRUCTIONS:  GUIDELINES TO FOLLOW AT [facility]/SNF/ Assisted Living    Future Appointments   Date Time Provider Mauri Sanford   2022 10:00 PM [facility]ED[facility]   10/24/2022  1:45 PM FRIDA Duenas - CNP [facility]          MEDICATIONS:  Please notify the doctor if patient is not able to take their medications or if medications are being held for any reasons (such as low blood pressure ect.)  Do not give the patient ibuprofen (Advil or Motrin), naproxen (Aleve) without talking to the doctor first. This could make their heart failure worse.          WEIGHT MONITORING:   Weigh patient every day in the morning after they void (If patient is able to stand, please get a standing weight.)   Notify the doctor of a weight gain of 3 pounds or more in 1 day   OR  a total of 5 pounds or more in 1 week             DIET   Cardiac heart healthy diet:  Low sodium diet: no  more than 2,000mg (2 grams) of salt / sodium per day (which equals to a little less than  a teaspoon of salt)/ Cardiac Diet: Low saturated / low trans fat, no added salt, caffeine restricted    If patient is there for rehab and will be returning home in the near future; reinforce with the patient and the family to follow a low sodium diet (2,000 mg)- avoid using salt at the table, avoid / limit use of canned soups, processed / packaged foods, salted snacks, olives and pickles. Do not use a salt substitute without checking with the doctor. (Mrs. Ramonita Galvez is safe to use).        NOTIFY THE DOCTOR THE FIRST DAY OF ONSET OF ANY OF THESE   SYMPTOMS:   Weight gain of 3 pounds or more in 1 day         OR 5 pounds or more in one week  More shortness of breath  More swelling in stomach, legs, ankles or feet  Feeling more tired, No energy  Dry hacky cough  Dizziness  More chest pain / discomfort  Hard time breathing laying down     BMP in 3 Weeks follow up with nephrology in 1 month

## 2022-09-16 ENCOUNTER — APPOINTMENT (OUTPATIENT)
Dept: GENERAL RADIOLOGY | Age: 67
DRG: 682 | End: 2022-09-16
Payer: MEDICARE

## 2022-09-16 LAB
AADO2: 285 MMHG
ANION GAP SERPL CALCULATED.3IONS-SCNC: 14 MMOL/L (ref 7–16)
ANION GAP SERPL CALCULATED.3IONS-SCNC: 18 MMOL/L (ref 7–16)
B.E.: -0.6 MMOL/L (ref -3–3)
B.E.: 0 MMOL/L (ref -3–3)
B.E.: 0.6 MMOL/L (ref -3–3)
BUN BLDV-MCNC: 12 MG/DL (ref 6–23)
BUN BLDV-MCNC: 12 MG/DL (ref 6–23)
CALCIUM SERPL-MCNC: 8.3 MG/DL (ref 8.6–10.2)
CALCIUM SERPL-MCNC: 8.5 MG/DL (ref 8.6–10.2)
CHLORIDE BLD-SCNC: 95 MMOL/L (ref 98–107)
CHLORIDE BLD-SCNC: 98 MMOL/L (ref 98–107)
CO2: 17 MMOL/L (ref 22–29)
CO2: 27 MMOL/L (ref 22–29)
COHB: 0.8 % (ref 0–1.5)
COHB: 1.2 % (ref 0–1.5)
COMMENT: ABNORMAL
COMMENT: ABNORMAL
CREAT SERPL-MCNC: 1.3 MG/DL (ref 0.5–1)
CREAT SERPL-MCNC: 1.4 MG/DL (ref 0.5–1)
CRITICAL: ABNORMAL
CRITICAL: ABNORMAL
DATE ANALYZED: ABNORMAL
DATE ANALYZED: ABNORMAL
DATE OF COLLECTION: ABNORMAL
DATE OF COLLECTION: ABNORMAL
DEVICE: ABNORMAL
FIO2: 100 %
GFR AFRICAN AMERICAN: 46
GFR AFRICAN AMERICAN: 50
GFR NON-AFRICAN AMERICAN: 46 ML/MIN/1.73
GFR NON-AFRICAN AMERICAN: 50 ML/MIN/1.73
GLUCOSE BLD-MCNC: 125 MG/DL (ref 74–99)
GLUCOSE BLD-MCNC: 80 MG/DL (ref 74–99)
HCO3: 28.6 MMOL/L (ref 22–26)
HCO3: 28.7 MMOL/L (ref 22–26)
HCO3: 29.6 MMOL/L (ref 22–26)
HHB: 0.2 % (ref 0–5)
HHB: 3.6 % (ref 0–5)
LAB: ABNORMAL
MAGNESIUM: 2.4 MG/DL (ref 1.6–2.6)
METER GLUCOSE: 41 MG/DL (ref 74–99)
METER GLUCOSE: 44 MG/DL (ref 74–99)
METER GLUCOSE: 75 MG/DL (ref 74–99)
METER GLUCOSE: 94 MG/DL (ref 74–99)
METHB: 0.5 % (ref 0–1.5)
METHB: 0.6 % (ref 0–1.5)
MODE: ABNORMAL
MODE: ABNORMAL
O2 SATURATION: 86.7 % (ref 92–98.5)
O2 SATURATION: 96.2 % (ref 92–98.5)
O2 SATURATION: 99.6 % (ref 92–98.5)
O2HB: 94.7 % (ref 94–97)
O2HB: 98.4 % (ref 94–97)
OPERATOR ID: 1119
PATIENT TEMP: 37 C
PATIENT TEMP: 37 C
PCO2 37: 64.7 MMHG (ref 35–45)
PCO2: 72.5 MMHG (ref 35–45)
PCO2: 72.8 MMHG (ref 35–45)
PEEP/CPAP: 8 CMH2O
PFO2: 3.31 MMHG/%
PH 37: 7.25 (ref 7.35–7.45)
PH BLOOD GAS: 7.21 (ref 7.35–7.45)
PH BLOOD GAS: 7.23 (ref 7.35–7.45)
PO2 37: 62.4 MMHG (ref 60–80)
PO2: 101.7 MMHG (ref 75–100)
PO2: 330.5 MMHG (ref 75–100)
POC SOURCE: ABNORMAL
POTASSIUM REFLEX MAGNESIUM: 3.4 MMOL/L (ref 3.5–5)
POTASSIUM SERPL-SCNC: 4.7 MMOL/L (ref 3.5–5)
REASON FOR REJECTION: NORMAL
REJECTED TEST: NORMAL
RI(T): 0.86
RR MECHANICAL: 20 B/MIN
SODIUM BLD-SCNC: 133 MMOL/L (ref 132–146)
SODIUM BLD-SCNC: 136 MMOL/L (ref 132–146)
SOURCE, BLOOD GAS: ABNORMAL
SOURCE, BLOOD GAS: ABNORMAL
THB: 11.6 G/DL (ref 11.5–16.5)
THB: 11.9 G/DL (ref 11.5–16.5)
TIME ANALYZED: 1850
TIME ANALYZED: 1950
VT MECHANICAL: 450 ML

## 2022-09-16 PROCEDURE — 6360000002 HC RX W HCPCS: Performed by: STUDENT IN AN ORGANIZED HEALTH CARE EDUCATION/TRAINING PROGRAM

## 2022-09-16 PROCEDURE — 1200000000 HC SEMI PRIVATE

## 2022-09-16 PROCEDURE — 93005 ELECTROCARDIOGRAM TRACING: CPT | Performed by: STUDENT IN AN ORGANIZED HEALTH CARE EDUCATION/TRAINING PROGRAM

## 2022-09-16 PROCEDURE — 71046 X-RAY EXAM CHEST 2 VIEWS: CPT

## 2022-09-16 PROCEDURE — 2500000003 HC RX 250 WO HCPCS

## 2022-09-16 PROCEDURE — 6360000002 HC RX W HCPCS: Performed by: FAMILY MEDICINE

## 2022-09-16 PROCEDURE — 82962 GLUCOSE BLOOD TEST: CPT

## 2022-09-16 PROCEDURE — 6370000000 HC RX 637 (ALT 250 FOR IP): Performed by: STUDENT IN AN ORGANIZED HEALTH CARE EDUCATION/TRAINING PROGRAM

## 2022-09-16 PROCEDURE — 2580000003 HC RX 258: Performed by: STUDENT IN AN ORGANIZED HEALTH CARE EDUCATION/TRAINING PROGRAM

## 2022-09-16 PROCEDURE — 94660 CPAP INITIATION&MGMT: CPT

## 2022-09-16 PROCEDURE — 83735 ASSAY OF MAGNESIUM: CPT

## 2022-09-16 PROCEDURE — 36415 COLL VENOUS BLD VENIPUNCTURE: CPT

## 2022-09-16 PROCEDURE — 2580000003 HC RX 258: Performed by: FAMILY MEDICINE

## 2022-09-16 PROCEDURE — 2700000000 HC OXYGEN THERAPY PER DAY

## 2022-09-16 PROCEDURE — 6370000000 HC RX 637 (ALT 250 FOR IP): Performed by: FAMILY MEDICINE

## 2022-09-16 PROCEDURE — 36600 WITHDRAWAL OF ARTERIAL BLOOD: CPT

## 2022-09-16 PROCEDURE — 82803 BLOOD GASES ANY COMBINATION: CPT

## 2022-09-16 PROCEDURE — 94640 AIRWAY INHALATION TREATMENT: CPT

## 2022-09-16 PROCEDURE — 82805 BLOOD GASES W/O2 SATURATION: CPT

## 2022-09-16 PROCEDURE — 80048 BASIC METABOLIC PNL TOTAL CA: CPT

## 2022-09-16 RX ORDER — POTASSIUM CHLORIDE 20 MEQ/1
40 TABLET, EXTENDED RELEASE ORAL PRN
Status: DISCONTINUED | OUTPATIENT
Start: 2022-09-16 | End: 2022-09-27 | Stop reason: HOSPADM

## 2022-09-16 RX ORDER — POTASSIUM CHLORIDE 7.45 MG/ML
10 INJECTION INTRAVENOUS PRN
Status: DISCONTINUED | OUTPATIENT
Start: 2022-09-16 | End: 2022-09-27 | Stop reason: HOSPADM

## 2022-09-16 RX ORDER — FAMOTIDINE 20 MG/1
20 TABLET, FILM COATED ORAL DAILY
Qty: 60 TABLET | Refills: 0
Start: 2022-09-16

## 2022-09-16 RX ORDER — DEXTROSE AND SODIUM CHLORIDE 5; .45 G/100ML; G/100ML
INJECTION, SOLUTION INTRAVENOUS CONTINUOUS
Status: ACTIVE | OUTPATIENT
Start: 2022-09-16 | End: 2022-09-17

## 2022-09-16 RX ORDER — ESCITALOPRAM OXALATE 10 MG/1
10 TABLET ORAL DAILY
Qty: 30 TABLET | Refills: 0
Start: 2022-09-16

## 2022-09-16 RX ORDER — DEXTROSE MONOHYDRATE 100 MG/ML
INJECTION, SOLUTION INTRAVENOUS CONTINUOUS PRN
Status: DISCONTINUED | OUTPATIENT
Start: 2022-09-16 | End: 2022-09-27 | Stop reason: HOSPADM

## 2022-09-16 RX ORDER — FUROSEMIDE 10 MG/ML
40 INJECTION INTRAMUSCULAR; INTRAVENOUS ONCE
Status: COMPLETED | OUTPATIENT
Start: 2022-09-16 | End: 2022-09-16

## 2022-09-16 RX ORDER — DEXTROSE MONOHYDRATE 25 G/50ML
INJECTION, SOLUTION INTRAVENOUS
Status: COMPLETED
Start: 2022-09-16 | End: 2022-09-16

## 2022-09-16 RX ADMIN — LEVOTHYROXINE SODIUM 75 MCG: 0.07 TABLET ORAL at 05:25

## 2022-09-16 RX ADMIN — ESCITALOPRAM OXALATE 10 MG: 10 TABLET ORAL at 09:58

## 2022-09-16 RX ADMIN — DEXTROSE MONOHYDRATE: 25 INJECTION, SOLUTION INTRAVENOUS at 19:15

## 2022-09-16 RX ADMIN — FUROSEMIDE 40 MG: 10 INJECTION, SOLUTION INTRAMUSCULAR; INTRAVENOUS at 18:16

## 2022-09-16 RX ADMIN — OXYCODONE AND ACETAMINOPHEN 2 TABLET: 5; 325 TABLET ORAL at 12:14

## 2022-09-16 RX ADMIN — ENOXAPARIN SODIUM 40 MG: 100 INJECTION SUBCUTANEOUS at 09:56

## 2022-09-16 RX ADMIN — BUDESONIDE 250 MCG: 0.25 SUSPENSION RESPIRATORY (INHALATION) at 21:08

## 2022-09-16 RX ADMIN — SODIUM CHLORIDE: 9 INJECTION, SOLUTION INTRAVENOUS at 08:44

## 2022-09-16 RX ADMIN — DILTIAZEM HYDROCHLORIDE 30 MG: 30 TABLET, FILM COATED ORAL at 09:58

## 2022-09-16 RX ADMIN — DILTIAZEM HYDROCHLORIDE 30 MG: 30 TABLET, FILM COATED ORAL at 22:20

## 2022-09-16 RX ADMIN — ESCITALOPRAM OXALATE 10 MG: 10 TABLET ORAL at 22:20

## 2022-09-16 RX ADMIN — POTASSIUM CHLORIDE 40 MEQ: 1500 TABLET, EXTENDED RELEASE ORAL at 12:14

## 2022-09-16 RX ADMIN — ARFORMOTEROL TARTRATE 15 MCG: 15 SOLUTION RESPIRATORY (INHALATION) at 21:08

## 2022-09-16 RX ADMIN — ONDANSETRON HYDROCHLORIDE 4 MG: 2 SOLUTION INTRAMUSCULAR; INTRAVENOUS at 12:20

## 2022-09-16 RX ADMIN — DESMOPRESSIN ACETATE 200 MCG: 0.1 TABLET ORAL at 09:59

## 2022-09-16 RX ADMIN — METOPROLOL SUCCINATE 100 MG: 100 TABLET, FILM COATED, EXTENDED RELEASE ORAL at 09:58

## 2022-09-16 RX ADMIN — DESMOPRESSIN ACETATE 200 MCG: 0.1 TABLET ORAL at 22:20

## 2022-09-16 RX ADMIN — FUROSEMIDE 40 MG: 40 TABLET ORAL at 09:58

## 2022-09-16 RX ADMIN — Medication 10 ML: at 22:20

## 2022-09-16 RX ADMIN — FERROUS SULFATE TAB 325 MG (65 MG ELEMENTAL FE) 325 MG: 325 (65 FE) TAB at 09:58

## 2022-09-16 RX ADMIN — WATER 1000 MG: 1 INJECTION INTRAMUSCULAR; INTRAVENOUS; SUBCUTANEOUS at 09:53

## 2022-09-16 RX ADMIN — FAMOTIDINE 20 MG: 20 TABLET ORAL at 09:58

## 2022-09-16 RX ADMIN — PREDNISONE 5 MG: 5 TABLET ORAL at 09:58

## 2022-09-16 RX ADMIN — GLUCAGON HYDROCHLORIDE: 1 INJECTION, POWDER, FOR SOLUTION INTRAMUSCULAR; INTRAVENOUS; SUBCUTANEOUS at 19:00

## 2022-09-16 ASSESSMENT — PAIN SCALES - GENERAL
PAINLEVEL_OUTOF10: 10
PAINLEVEL_OUTOF10: 0
PAINLEVEL_OUTOF10: 0
PAINLEVEL_OUTOF10: 4
PAINLEVEL_OUTOF10: 0

## 2022-09-16 ASSESSMENT — PAIN DESCRIPTION - LOCATION: LOCATION: ABDOMEN

## 2022-09-16 ASSESSMENT — PAIN DESCRIPTION - DESCRIPTORS: DESCRIPTORS: DISCOMFORT;STABBING;SHARP

## 2022-09-16 NOTE — PROGRESS NOTES
Hospitalist Progress Note      PCP: Rufus Dickerson MD    Date of Admission: 9/12/2022    Chief Complaint: Bright red blood per rectum, UTI, IRMA    Hospital Course:   Patient presented to the emergency department with abdominal pain and bright red blood per rectum. ER physician noted bright red streak of blood on rectal exam.  Vital signs within normal limits and stable. Laboratory studies demonstrate BUN 20, creatinine 2.0, hemoglobin 10.1. Urinalysis possible urinary tract infection. Patient given a dose of ceftriaxone. Given IV fluids for acute renal failure. Medicine consulted for admission. Subjective:   Patient was seen at bedside at this morning and mentions that she has not noticed any blood in her stools but mentions that she has been having persistent diarrhea around 1-2hrs, per charting only one bowel movt overnight. Discussed with charge nurse. Cough improved.     Medications:  Reviewed    Infusion Medications    sodium chloride 100 mL/hr at 09/16/22 0844    sodium chloride       Scheduled Medications    desmopressin  200 mcg Oral BID    dilTIAZem  30 mg Oral TID    escitalopram  10 mg Oral BID    famotidine  20 mg Oral Daily    ferrous sulfate  325 mg Oral BID WC    furosemide  40 mg Oral Once per day on Mon Wed Fri    levothyroxine  75 mcg Oral QAM AC    metoprolol succinate  100 mg Oral Daily    predniSONE  5 mg Oral Daily    budesonide  0.25 mg Nebulization BID    And    Arformoterol Tartrate  15 mcg Nebulization BID    cefTRIAXone (ROCEPHIN) IV  1,000 mg IntraVENous Q24H    enoxaparin  40 mg SubCUTAneous Daily    sodium chloride flush  10 mL IntraVENous 2 times per day     PRN Meds: white petrolatum, bismuth subsalicylate, promethazine-codeine, loperamide, iopamidol, oxyCODONE-acetaminophen, albuterol, sodium chloride flush, sodium chloride, [DISCONTINUED] promethazine **OR** ondansetron, polyethylene glycol, acetaminophen **OR** acetaminophen      Intake/Output Summary (Last 24 hours) at 9/16/2022 0948  Last data filed at 9/16/2022 0612  Gross per 24 hour   Intake 868 ml   Output 300 ml   Net 568 ml         Exam:    /88   Pulse 86   Temp 98.3 °F (36.8 °C) (Oral)   Resp 18   Ht 5' (1.524 m)   Wt 181 lb 9.6 oz (82.4 kg)   LMP  (LMP Unknown)   SpO2 96%   BMI 35.47 kg/m²     General appearance: Obese, appears stated age and cooperative. HEENT: Pupils equal, round, and reactive to light. Conjunctivae/corneas clear. Neck: Supple, with full range of motion. No jugular venous distention. Trachea midline. Respiratory:  Normal respiratory effort. Clear to auscultation, bilaterally without Rales/Wheezes/Rhonchi. Cardiovascular: Regular rate and rhythm with normal S1/S2 without murmurs, rubs or gallops. Abdomen: Soft, non-tender, non-distended with normal bowel sounds. hernia noted with protruding mass on the left side of the abdomen, Mild tenderness to palpation-LLQ  Musculoskeletal: No clubbing, cyanosis or edema bilaterally. Full range of motion without deformity. Skin: Skin color, texture, turgor normal.  No rashes or lesions. Left-sided abdominal mass/hernia with skin ulceration oblique-chronic nonhealing- does not look infected  Neurologic: No focal deficit, alert and oriented      Labs:   Recent Labs     09/13/22  1717 09/14/22  1047 09/15/22  1132   WBC  --  3.4* 4.3*   HGB 10.0* 10.0* 9.7*   HCT 33.7* 34.0 33.6*   PLT  --  138 155     Recent Labs     09/14/22  1604 09/15/22  1132 09/16/22  0508    141 136   K 3.6 3.4* 3.4*   CL 97* 98 95*   CO2 31* 28 27   BUN 8 10 12   CREATININE 0.9 1.2* 1.4*   CALCIUM 8.7 8.6 8.5*     No results for input(s): AST, ALT, BILIDIR, BILITOT, ALKPHOS in the last 72 hours. No results for input(s): INR in the last 72 hours. No results for input(s): Ana Puala Fuchs in the last 72 hours.     Assessment/Plan:    Active Hospital Problems    Diagnosis Date Noted    UTI (urinary tract infection) [N39.0] 09/12/2022     Priority: Medium   -Urinary tract infection  -IRMA  -Bright red blood per rectum  -History of atrial fibrillation  -COPD with chronic CO2 Retention  - oBESE- Class I   -HFpEF  -Moderate mitral regurgitation  -Severe pulmonary hypertension  -Moderate tricuspid regurgitation    PLAN:  -Ceftriaxone will be discontinued  -Urine culture-neg-<10,0000  -Monitor renal function avoid nephrotoxic medications  - persistent diarrhea- no leukocytosis or foul smelling stools, antibiotics discontinued. CT of the abdomen does not show any acute findings suggestive of infectious diarrhea-no colitis/diverticulitis. \"Procalcitonin negative  - IRMA-creatinine at 1.4 possibly from the diarrhea. Continue IV fluids  -Monitor hemoglobin levels-  Hemoglobin stable at 9.7.  -Transfuse for hemoglobin less than 7.0  -Consider general surgery consult if significant drop in hemoglobin. Currently stable      DVT Prophylaxis: Lovenox  Diet: ADULT DIET; Regular;  Low Sodium (2 gm)  Code Status: Full Code      Dispo -pending clinical improvement- diarrhea,  monitoring hemoglobin    Franklyn Mantilla MD

## 2022-09-16 NOTE — PLAN OF CARE
Problem: Pain  Goal: Verbalizes/displays adequate comfort level or baseline comfort level  9/16/2022 1144 by Rhea Graham RN  Outcome: Progressing  Flowsheets (Taken 9/16/2022 1144)  Verbalizes/displays adequate comfort level or baseline comfort level:   Encourage patient to monitor pain and request assistance   Assess pain using appropriate pain scale   Administer analgesics based on type and severity of pain and evaluate response   Implement non-pharmacological measures as appropriate and evaluate response   Consider cultural and social influences on pain and pain management  9/15/2022 2316 by Karla Zeng RN  Outcome: Progressing  9/15/2022 2309 by Marjorie Chandler RN  Outcome: Progressing     Problem: Safety - Adult  Goal: Free from fall injury  9/16/2022 1144 by Rhea Graham RN  Outcome: Progressing  Flowsheets (Taken 9/16/2022 1141)  Free From Fall Injury: Instruct family/caregiver on patient safety  9/15/2022 2316 by Karla Zeng RN  Outcome: Progressing  [address] (Taken 9/15/2022 2316)  Free From Fall Injury: Instruct family/caregiver on patient safety  9/15/2022 2309 by Marjorie Chandler RN  Outcome: Progressing     Problem: Cardiovascular - Adult  Goal: Maintains optimal cardiac output and hemodynamic stability  9/16/2022 1144 by Rhea Graham RN  Outcome: Progressing  Flowsheets (Taken 9/16/2022 1144)  Maintains optimal cardiac output and hemodynamic stability:   Monitor blood pressure and heart rate   Monitor urine output and notify Licensed Independent Practitioner for values outside of normal range   Assess for signs of decreased cardiac output   Administer fluid and/or volume expanders as ordered  9/15/2022 2316 by Karla Zeng RN  Outcome: Progressing     Problem: Metabolic/Fluid and Electrolytes - Adult  Goal: Hemodynamic stability and optimal renal function maintained  9/16/2022 1144 by Rhea Graham RN  Outcome: Progressing  Flowsheets (Taken 9/16/2022 1144)  Hemodynamic stability and optimal renal function maintained:   Monitor labs and assess for signs and symptoms of volume excess or deficit   Monitor urine specific gravity, serum osmolarity and serum sodium as indicated or ordered   Monitor intake, output and patient weight   Monitor response to interventions for patient's volume status, including labs, urine output, blood pressure (other measures as available)   Encourage oral intake as appropriate   Instruct patient on fluid and nutrition restrictions as appropriate  9/15/2022 2316 by Vernon Rodrigues RN  Outcome: Progressing     Problem: Chronic Conditions and Co-morbidities  Goal: Patient's chronic conditions and co-morbidity symptoms are monitored and maintained or improved  9/16/2022 1144 by Joselin Cook RN  Outcome: Progressing  Flowsheets (Taken 9/16/2022 1144)  Care Plan - Patient's Chronic Conditions and Co-Morbidity Symptoms are Monitored and Maintained or Improved:   Monitor and assess patient's chronic conditions and comorbid symptoms for stability, deterioration, or improvement   Collaborate with multidisciplinary team to address chronic and comorbid conditions and prevent exacerbation or deterioration  9/15/2022 2316 by Vernon Rodrigues RN  Outcome: Progressing     Problem: ABCDS Injury Assessment  Goal: Absence of physical injury  9/16/2022 1144 by Joselin Cook RN  Outcome: Progressing  Flowsheets (Taken 9/16/2022 1141)  Absence of Physical Injury: Implement safety measures based on patient assessment  9/15/2022 2316 by Vernon Rodrigues RN  Outcome: Progressing  Flowsheets (Taken 9/15/2022 2316)  Absence of Physical Injury: Implement safety measures based on patient assessment

## 2022-09-16 NOTE — PROGRESS NOTES
ABG drawn x 1 from Right Radial. Patient had NormalAllen's Test.  Patient was on 12 liters/min via nasal cannula  at time of puncture. Pressure held for 5. No bleeding or bruising noted at puncture site.   Patient tolerated procedure well      Performed by Emilie Meeks RCP

## 2022-09-16 NOTE — PROGRESS NOTES
Sent perfectserve to Dr. Sherie Sales    Patient's oxygen levels were in the low to mid 80s when I did her vitals. I bumped her up to 5L. She did finally come up to 92-95%  She's just very restless this afternoon. She slept all morning. She has had 2 small loss stools. /91, , which came back down to 81.     Dr. Sherie Sales ordered chest xray and lactic acid level

## 2022-09-16 NOTE — PROGRESS NOTES
Comprehensive Nutrition Assessment    Type and Reason for Visit:  Initial, Consult, Wound    Nutrition Recommendations/Plan:   Continue Diet. Will Start ONS and monitor. Malnutrition Assessment:  Malnutrition Status: At risk for malnutrition (Comment) (09/16/22 1330)    Context:  Acute Illness     Findings of the 6 clinical characteristics of malnutrition:  Energy Intake:  Mild decrease in energy intake (Comment) (since adm)  Weight Loss:  Unable to assess (2/2 fluid shifts)     Body Fat Loss:  No significant body fat loss     Muscle Mass Loss:  No significant muscle mass loss    Fluid Accumulation:  Unable to assess (22 CHF)     Strength:  Not Performed    Nutrition Assessment:    Pt adm w/ chronic abd pain, however worsening x past ~2d pta w/ noted BRBPR/diarrhea. PMHx CHF, CKD, COPD, MDD, chronic hernia/non-healing surgical wound. Adm w/ UTI/IRMA/GIB and +ongoing diarrhea. Pt at nutritional risk d/t increased needs for wound healing. Will Start ONS and monitor. Nutrition Related Findings:    A&O, U/L dentures, tender/non-distended Abd, +BS, +diarrhea, +1 edema, +I/O's Wound Type: Surgical Incision       Current Nutrition Intake & Therapies:    Average Meal Intake: 51-75% (sporadic intake at times)  Average Supplements Intake: None Ordered  ADULT DIET; Regular; Low Sodium (2 gm)    Anthropometric Measures:  Height: 5' (152.4 cm)  Ideal Body Weight (IBW): 100 lbs (45 kg)    Admission Body Weight: 176 lb (79.8 kg) (bed 9/14)  Current Body Weight: 181 lb (82.1 kg) (bed 9/16),   IBW.  Weight Source: Bed Scale  Current BMI (kg/m2): 35.3  Usual Body Weight: 182 lb (82.6 kg) (bed 7/11/22 per EMR; BAYRON wt change properly d/t possible fluid shifts w/ CHF/CKD hx)  % Weight Change (Calculated): -0.5                    BMI Categories: Obese Class 2 (BMI 35.0 -39.9)    Estimated Daily Nutrient Needs:  Energy Requirements Based On: Formula  Weight Used for Energy Requirements: Admission  Energy (kcal/day):   Weight Used for Protein Requirements: Ideal  Protein (g/day): 1.3-1.5gm/kg IBW= 60-70; as tolerated w/ IRMA/CKD and increased needs  Method Used for Fluid Requirements: 1 ml/kcal  Fluid (ml/day):     Nutrition Diagnosis:   Increased nutrient needs related to increase demand for energy/nutrients as evidenced by wounds    Nutrition Interventions:   Food and/or Nutrient Delivery: Continue Current Diet, Start Oral Nutrition Supplement (Continue Diet. Will Start ONS and monitor.)  Nutrition Education/Counseling: Education not indicated  Coordination of Nutrition Care: Continue to monitor while inpatient       Goals:     Goals: PO intake 75% or greater       Nutrition Monitoring and Evaluation:   Behavioral-Environmental Outcomes: None Identified  Food/Nutrient Intake Outcomes: Food and Nutrient Intake, Supplement Intake  Physical Signs/Symptoms Outcomes: Biochemical Data, Chewing or Swallowing, Diarrhea, GI Status, Fluid Status or Edema, Nutrition Focused Physical Findings, Skin, Weight    Discharge Planning:     Too soon to determine     Jeanine Rivera RD, LD  Contact: ext [phone]

## 2022-09-16 NOTE — PROGRESS NOTES
Perfectserve to Dr. Domingo leija given. Took vitals; patient's oxygen level was in 60s; noticed oxygen was turned off. patient very lethargic. Turned her up to 15L, she's at 99% right now. /77; HR 93    Dr. Domingo Swan said if to get an ABG. ABG ordered; respiratory paged.

## 2022-09-16 NOTE — PLAN OF CARE
Problem: Pain  Goal: Verbalizes/displays adequate comfort level or baseline comfort level  9/15/2022 2316 by Larry Topete RN  Outcome: Progressing  9/15/2022 2309 by Eugenia Shaw RN  Outcome: Progressing     Problem: Safety - Adult  Goal: Free from fall injury  9/15/2022 2316 by Larry Topete RN  Outcome: Progressing  9/15/2022 2309 by Eugenia Shaw RN  Outcome: Progressing     Problem: Cardiovascular - Adult  Goal: Maintains optimal cardiac output and hemodynamic stability  Outcome: Progressing     Problem: Metabolic/Fluid and Electrolytes - Adult  Goal: Hemodynamic stability and optimal renal function maintained  Outcome: Progressing     Problem: Chronic Conditions and Co-morbidities  Goal: Patient's chronic conditions and co-morbidity symptoms are monitored and maintained or improved  Outcome: Progressing     Problem: ABCDS Injury Assessment  Goal: Absence of physical injury  Outcome: Progressing

## 2022-09-16 NOTE — PROGRESS NOTES
ABG drawn x 1 from Right Radial. Patient had NormalAllen's Test.  Patient was on 100% O2 via  AVAPS 450 20 +8   at time of puncture. Pressure held for 5. No bleeding or bruising noted at puncture site.   Patient tolerated procedure well      Performed by Madeline Valderrama RCP

## 2022-09-17 LAB
ANION GAP SERPL CALCULATED.3IONS-SCNC: 12 MMOL/L (ref 7–16)
BUN BLDV-MCNC: 15 MG/DL (ref 6–23)
CALCIUM SERPL-MCNC: 8.4 MG/DL (ref 8.6–10.2)
CHLORIDE BLD-SCNC: 97 MMOL/L (ref 98–107)
CO2: 26 MMOL/L (ref 22–29)
CREAT SERPL-MCNC: 1.6 MG/DL (ref 0.5–1)
GFR AFRICAN AMERICAN: 39
GFR NON-AFRICAN AMERICAN: 39 ML/MIN/1.73
GLUCOSE BLD-MCNC: 82 MG/DL (ref 74–99)
METER GLUCOSE: 105 MG/DL (ref 74–99)
METER GLUCOSE: 131 MG/DL (ref 74–99)
METER GLUCOSE: 146 MG/DL (ref 74–99)
METER GLUCOSE: 78 MG/DL (ref 74–99)
POTASSIUM REFLEX MAGNESIUM: 4.3 MMOL/L (ref 3.5–5)
PRO-BNP: ABNORMAL PG/ML (ref 0–125)
SODIUM BLD-SCNC: 135 MMOL/L (ref 132–146)

## 2022-09-17 PROCEDURE — 2700000000 HC OXYGEN THERAPY PER DAY

## 2022-09-17 PROCEDURE — 6360000002 HC RX W HCPCS: Performed by: FAMILY MEDICINE

## 2022-09-17 PROCEDURE — 1200000000 HC SEMI PRIVATE

## 2022-09-17 PROCEDURE — 94640 AIRWAY INHALATION TREATMENT: CPT

## 2022-09-17 PROCEDURE — 6370000000 HC RX 637 (ALT 250 FOR IP)

## 2022-09-17 PROCEDURE — 6370000000 HC RX 637 (ALT 250 FOR IP): Performed by: STUDENT IN AN ORGANIZED HEALTH CARE EDUCATION/TRAINING PROGRAM

## 2022-09-17 PROCEDURE — 80048 BASIC METABOLIC PNL TOTAL CA: CPT

## 2022-09-17 PROCEDURE — 36415 COLL VENOUS BLD VENIPUNCTURE: CPT

## 2022-09-17 PROCEDURE — 82962 GLUCOSE BLOOD TEST: CPT

## 2022-09-17 PROCEDURE — 94660 CPAP INITIATION&MGMT: CPT

## 2022-09-17 PROCEDURE — 6370000000 HC RX 637 (ALT 250 FOR IP): Performed by: FAMILY MEDICINE

## 2022-09-17 PROCEDURE — 2580000003 HC RX 258: Performed by: FAMILY MEDICINE

## 2022-09-17 PROCEDURE — 2580000003 HC RX 258: Performed by: INTERNAL MEDICINE

## 2022-09-17 PROCEDURE — 6360000002 HC RX W HCPCS: Performed by: STUDENT IN AN ORGANIZED HEALTH CARE EDUCATION/TRAINING PROGRAM

## 2022-09-17 PROCEDURE — 83880 ASSAY OF NATRIURETIC PEPTIDE: CPT

## 2022-09-17 RX ORDER — FUROSEMIDE 10 MG/ML
40 INJECTION INTRAMUSCULAR; INTRAVENOUS 2 TIMES DAILY
Status: DISCONTINUED | OUTPATIENT
Start: 2022-09-17 | End: 2022-09-25

## 2022-09-17 RX ORDER — GUAIFENESIN 400 MG/1
400 TABLET ORAL 3 TIMES DAILY
Status: DISCONTINUED | OUTPATIENT
Start: 2022-09-17 | End: 2022-09-27 | Stop reason: HOSPADM

## 2022-09-17 RX ADMIN — DESMOPRESSIN ACETATE 200 MCG: 0.1 TABLET ORAL at 22:01

## 2022-09-17 RX ADMIN — ENOXAPARIN SODIUM 40 MG: 100 INJECTION SUBCUTANEOUS at 12:10

## 2022-09-17 RX ADMIN — METOPROLOL SUCCINATE 100 MG: 100 TABLET, FILM COATED, EXTENDED RELEASE ORAL at 12:05

## 2022-09-17 RX ADMIN — FAMOTIDINE 20 MG: 20 TABLET ORAL at 12:05

## 2022-09-17 RX ADMIN — DESMOPRESSIN ACETATE 200 MCG: 0.1 TABLET ORAL at 12:02

## 2022-09-17 RX ADMIN — Medication 10 ML: at 22:02

## 2022-09-17 RX ADMIN — DILTIAZEM HYDROCHLORIDE 30 MG: 30 TABLET, FILM COATED ORAL at 12:01

## 2022-09-17 RX ADMIN — ESCITALOPRAM OXALATE 10 MG: 10 TABLET ORAL at 12:02

## 2022-09-17 RX ADMIN — FERROUS SULFATE TAB 325 MG (65 MG ELEMENTAL FE) 325 MG: 325 (65 FE) TAB at 16:40

## 2022-09-17 RX ADMIN — BUDESONIDE 250 MCG: 0.25 SUSPENSION RESPIRATORY (INHALATION) at 19:53

## 2022-09-17 RX ADMIN — GUAIFENESIN 400 MG: 400 TABLET ORAL at 22:01

## 2022-09-17 RX ADMIN — DILTIAZEM HYDROCHLORIDE 30 MG: 30 TABLET, FILM COATED ORAL at 22:01

## 2022-09-17 RX ADMIN — LEVOTHYROXINE SODIUM 75 MCG: 0.07 TABLET ORAL at 06:41

## 2022-09-17 RX ADMIN — GUAIFENESIN 400 MG: 400 TABLET ORAL at 16:40

## 2022-09-17 RX ADMIN — FERROUS SULFATE TAB 325 MG (65 MG ELEMENTAL FE) 325 MG: 325 (65 FE) TAB at 12:01

## 2022-09-17 RX ADMIN — BUDESONIDE 250 MCG: 0.25 SUSPENSION RESPIRATORY (INHALATION) at 08:17

## 2022-09-17 RX ADMIN — Medication 10 ML: at 12:56

## 2022-09-17 RX ADMIN — FUROSEMIDE 40 MG: 10 INJECTION, SOLUTION INTRAMUSCULAR; INTRAVENOUS at 19:49

## 2022-09-17 RX ADMIN — ARFORMOTEROL TARTRATE 15 MCG: 15 SOLUTION RESPIRATORY (INHALATION) at 08:16

## 2022-09-17 RX ADMIN — FUROSEMIDE 40 MG: 10 INJECTION, SOLUTION INTRAMUSCULAR; INTRAVENOUS at 12:09

## 2022-09-17 RX ADMIN — ARFORMOTEROL TARTRATE 15 MCG: 15 SOLUTION RESPIRATORY (INHALATION) at 19:52

## 2022-09-17 RX ADMIN — DEXTROSE AND SODIUM CHLORIDE: 5; 450 INJECTION, SOLUTION INTRAVENOUS at 00:17

## 2022-09-17 RX ADMIN — OXYCODONE AND ACETAMINOPHEN 2 TABLET: 5; 325 TABLET ORAL at 22:01

## 2022-09-17 RX ADMIN — ESCITALOPRAM OXALATE 10 MG: 10 TABLET ORAL at 22:01

## 2022-09-17 RX ADMIN — PREDNISONE 5 MG: 5 TABLET ORAL at 12:01

## 2022-09-17 RX ADMIN — OXYCODONE AND ACETAMINOPHEN 2 TABLET: 5; 325 TABLET ORAL at 12:20

## 2022-09-17 ASSESSMENT — PAIN SCALES - GENERAL
PAINLEVEL_OUTOF10: 8
PAINLEVEL_OUTOF10: 5
PAINLEVEL_OUTOF10: 10

## 2022-09-17 ASSESSMENT — PAIN DESCRIPTION - DESCRIPTORS
DESCRIPTORS: ACHING;BURNING;CRAMPING
DESCRIPTORS: ACHING;BURNING
DESCRIPTORS: ACHING;CRUSHING

## 2022-09-17 ASSESSMENT — PAIN DESCRIPTION - ORIENTATION
ORIENTATION: RIGHT;LEFT
ORIENTATION: RIGHT;LEFT
ORIENTATION: MID;LOWER

## 2022-09-17 ASSESSMENT — PAIN DESCRIPTION - LOCATION
LOCATION: BUTTOCKS;BACK
LOCATION: BUTTOCKS
LOCATION: BACK

## 2022-09-17 NOTE — PROGRESS NOTES
ABG drawn x 1 from Right Radial. Patient had NormalAllen's Test.  Patient was on 50% O2 via  AVAPS 20 450 +8   at time of puncture. Pressure held for 5. No bleeding or bruising noted at puncture site.   Patient tolerated procedure well      Performed by Sascha Mccormack RCP

## 2022-09-17 NOTE — CONSULTS
Nevin Calloway M.D.,[facility]  Matt Le D.O., F.A.C.O.I., Shandra Jordan M.D. Newton Dove M.D. Shantanu Medina D.O. Patient:  Quentin Joyce 73 y.o. female MRN: [id number]     Date of Service: 9/17/2022      PULMONARY CONSULTATION    Reason for Consultation: respiratory failure, sarcoidosis  Referring Physician: Dr. Rani Elkins with the referring physician will be sent via the electronic medical record. Chief Complaint: Rectal bleeding    CODE STATUS: FULL    SUBJECTIVE:  HPI:  Quentin Joyce is a 73 y.o. female known to our practice who follows with Dr. Keyla Rayo for chronic hypoxia, COPD, and sarcoidosis. Last seen in office with our nurse practitioner 8/11/2022 for follow-up regarding her recent hospitalization where we saw her for bronchiectasis, sarcoidosis, and worsening dyspnea on exertion. She is on chronic oxygen at home at 1.5 LPM and was originally diagnosed with severe pulmonary sarcoidosis in 1998. She had a lung biopsy done in 2001 and based on prior CT imaging, there was thought to be a fibrotic component. She also has severe pulmonary hypertension likely who group 5, 2, 3. She has moderate COPD Gold stage II with restriction on PFTs in 2017. She has mild scarring in the RML, LLL on CT chest imaging. She had acute on chronic CHF with a preserved EF of 60% in the hospital.  She is on chronic prednisone for adrenal insufficiency and is in stage III of chronic kidney disease. She is a former smoker with an 18.75-pack-year history. She likely has underlying LINDSAY and was scheduled for a PSG. She also has a history of PAF but is currently off Eliquis due to history of GI bleeding, anemia, and epistaxis. She has been noncompliant with her Lasix at home but si compliant with Breo and Albuterol PRN. Patient presented to the emergency department with bright red rectal bleeding and abdominal pain.   Patient was found to have possible urinary tract infection, given Rocephin x1 IV and IV fluids for IRMA. On , patient was RRT for altered mental status. Blood glucose 44, CO2 72 and patient placed on AVAPS. Repeat AB.25/64.7/62.4/28.6/86.7%. Pulmonary was consulted for acute respiratory failure management. Patient seen and examined. Resting in bed alert on AVAPS. Patient admits to some shortness of breath but states she has felt better since being in the hospital.  She denies chest tightness, wheezing. She reports a congested cough but denies any mucus production. She denies any increase in edema or weight gain. Patient denies fever, chills, nausea, vomiting. She is requesting her AVAPS to be removed so she can have breakfast due to having an episode of hypoglycemia yesterday. Discussed care with charge RN.     Past Medical History:   Diagnosis Date    A-fib [facility])     follows with Dr Darien Cruz to transfer from chair to wheelchair     with assistance    Abnormal mammogram     Acute on chronic respiratory failure ([address] 2017    Anemia     Anemia due to chronic illness     Ankle fracture, left 2008    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[address] 2007    Backache     Benign hypertension     CHF (congestive heart failure) (HCC)     Chronic back pain     Chronic kidney disease     stage 3    Chronic pain disorder     Chronic, continuous use of opioids     Compression fracture of thoracic vertebra (HCC)     T10    COPD (chronic obstructive pulmonary disease) ([address]     Debility     Deformity of ankle and foot, acquired 2011    Depression     Diabetes insipidus ([address]     Diverticulitis     Dry eyes     Encephalopathy acute 2017    Gait disturbance     GERD (gastroesophageal reflux disease)     Hemorrhoids 2012    Hernia     History of blood transfusion approx 2017    History of Clostridium difficile     negative culture 12-15-15; resolved    Hyperlipidemia     Hypothyroidism     Ischemic colitis, enteritis, or enterocolitis [facility])     Long term (current) use of systemic steroids 7/10/2022    Long-term current use of steroids     Lumbar disc herniation     Myalgia and myositis, unspecified     Nephrosclerosis     Nonunion of fracture 02/22/2011    Osteoarthritis     severe    PAF (paroxysmal atrial fibrillation) (HCC)     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HCC)     PCWP mean:26.0 PA mean: 24.00; denies any recent issues    Pulmonary hypertension (HCC)     ventricular diastolic function consistent with abnormal relaxation (stage I)    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, f/u w/ PCP    Rectal bleeding     Rhabdomyolysis 05/09/2011    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  fx T10    LUMBAR DISC SURGERY      OTHER SURGICAL HISTORY  11/1/11    removal r leg external fixator    OTHER SURGICAL HISTORY  12/14/2021    Partial Vaginectomy, Endometrial Biopsy    SIGMOIDOSCOPY N/A 3/19/2021    SIGMOIDOSCOPY HEMORRHOID BANDING performed by Maria Eugenia Culver MD at [address] [place] [address] / [facility]      application TSF  3/2/75    UPPER GASTROINTESTINAL ENDOSCOPY  1/4/2016    UPPER GASTROINTESTINAL ENDOSCOPY  07/26/2017       Family History   Problem Relation Age of Onset    Diabetes Mother     Arthritis Mother     High Blood Pressure Mother     Arthritis Father     High Blood Pressure Father     Diabetes Father     High Blood Pressure Sister     Alcohol Abuse Sister     Substance Abuse Brother     Substance Abuse Sister     Coronary Art Dis Neg Hx     Breast Cancer Neg Hx     Ovarian Cancer Neg Hx        Social History:   Social History     Socioeconomic History    Marital status:      Spouse name: Not on file    Number of children: Not on file    Years of education: Not on file    Highest education level: Not on file   Occupational History    Occupation: disability -[employer]   Tobacco Use    Smoking status: Former     Packs/day: 0.75     Years: 25.00     Pack years: 18.75     Types: Cigarettes     Start date:      Quit date: 9/10/1996     Years since quittin.0    Smokeless tobacco: Never    Tobacco comments:     Patient quit smoking 32 yrs.ago. Vaping Use    Vaping Use: Never used    Passive vaping exposure: Yes   Substance and Sexual Activity    Alcohol use: Never     Alcohol/week: 0.0 standard drinks    Drug use: Not Currently     Comment:  coccaine    Sexual activity: Not Currently   Other Topics Concern    Not on file   Social History Narrative    1 child, 2 step children,  for 20 years. Social Determinants of Health     Financial Resource Strain: Low Risk     Difficulty of Paying Living Expenses: Not hard at all   Food Insecurity: No Food Insecurity    Worried About Running Out of Food in the Last Year: Never true    Ran Out of Food in the Last Year: Never true   Transportation Needs: Not on file   Physical Activity: Inactive    Days of Exercise per Week: 0 days    Minutes of Exercise per Session: 0 min   Stress: Not on file   Social Connections: Not on file   Intimate Partner Violence: Not on file   Housing Stability: Not on file     Smoking history: The patient is a Past smoker of 25 years. Pack year equal 18.75. ETOH:   reports no history of alcohol use. Exposures: There  is not history of TB or TB exposure. There is not asbestos or silica dust exposure.   The patient reports does not have coal, foundry, quarry or [employer] exposure. Recent travel history none. There is not  history of recreational or IV drug use. There is not hot tub exposure. The patient does not have any exotic pets, turtles or exotic birds.        Vaccines:    Immunization History   Administered Date(s) Administered    COVID-19, MODERNA BLUE border, Primary or Immunocompromised, (age 12y+), IM, 100 mcg/0.5mL 02/27/2021, 03/25/2021, 04/11/2022    INFLUENZA, INTRADERMAL, QUADRIVALENT, PRESERVATIVE FREE 10/03/2019    Influenza 10/26/2011    Influenza A ([id number]) Vaccine PF IM 11/04/2009    Influenza Vaccine, unspecified formulation 10/26/2011, 12/11/2013, 09/26/2016, 09/24/2018    Influenza Virus Vaccine 10/01/2010, 09/01/2012, 12/11/2013, 11/27/2015, 10/12/2020    Influenza, AFLURIA (age 1 yrs+), FLUZONE, (age 10 mo+), MDV, 0.5mL 09/26/2016    Influenza, FLUARIX, FLULAVAL, FLUZONE (age 10 mo+) AND AFLURIA, (age 1 y+), PF, 0.5mL 09/24/2018, 10/28/2019, 10/12/2020, 11/04/2021    Influenza, FLUCELVAX, (age 10 mo+), MDCK, MDV, 0.5mL 09/25/2017    Influenza, Intradermal, Preservative free 11/05/2014    MMR 05/13/2019    Pneumococcal Conjugate Vaccine 10/06/2015    Pneumococcal Polysaccharide ([id number]) 06/02/2010, 01/03/2017, 05/19/2022    Tdap (Boostrix, Adacel) 06/02/2010    Zoster Recombinant (Shingrix) 10/12/2020, 10/12/2020, 08/03/2021        Home Meds: Medications Prior to Admission: furosemide (LASIX) 40 MG tablet, Take 40 mg by mouth three times a week  metoprolol succinate (TOPROL XL) 100 MG extended release tablet, Take 100 mg by mouth daily Given with Toprol xl 50 mg total dose 150 mg  metoprolol succinate (TOPROL XL) 50 MG extended release tablet, Take 50 mg by mouth daily Given with Toprol xl 100 mg total dose 150 mg  [DISCONTINUED] desmopressin (DDAVP) 0.1 MG tablet, Take 0.2 mg by mouth 2 times daily  [DISCONTINUED] HYDROcodone-acetaminophen (NORCO) 5-325 MG per tablet, Take 1 tablet by mouth every 6 hours as needed for Pain for up to 3 days. Intended supply: 3 days. Take lowest dose possible to manage pain  promethazine-codeine (PHENERGAN WITH CODEINE) 6.25-10 MG/5ML syrup, Take 5 mLs by mouth 4 times daily as needed for Cough for up to 60 days. omega-3 acid ethyl esters (LOVAZA) 1 g capsule, Take 1 capsule by mouth in the morning and 1 capsule before bedtime. [DISCONTINUED] oxyCODONE-acetaminophen (PERCOCET) 5-325 MG per tablet, Take 1 tablet by mouth every 4 hours as needed for Pain (max: 150/month) for up to 30 days. OXYGEN, Inhale 2 L/min into the lungs as needed (shortness of breath, low oxygen <90%) BMS  dilTIAZem (CARDIZEM) 30 MG tablet, Take 1 tablet by mouth in the morning and 1 tablet at noon and 1 tablet before bedtime.   levothyroxine (SYNTHROID) 75 MCG tablet, Take 1 tablet by mouth daily  ferrous sulfate (IRON 325) 325 (65 Fe) MG tablet, Take 1 tablet by mouth 2 times daily  predniSONE (DELTASONE) 5 MG tablet, Take 1 tablet by mouth daily  cycloSPORINE (RESTASIS) 0.05 % ophthalmic emulsion, Place 1 drop into both eyes every 12 hours  desmopressin (DDAVP) 0.1 mg tablet, Take 2 tablets by mouth 2 times daily  [DISCONTINUED] docusate sodium (COLACE) 100 mg capsule, Take 1 capsule by mouth 2 times daily  albuterol sulfate HFA (PROVENTIL HFA) 108 (90 Base) MCG/ACT inhaler, Inhale 2 puffs into the lungs every 6 hours as needed for Wheezing or Shortness of Breath  BREO ELLIPTA 100-25 MCG/INH AEPB inhaler, Inhale 1 puff into the lungs daily  albuterol (PROVENTIL) (2.5 MG/3ML) 0.083% nebulizer solution, Take 3 mLs by nebulization every 6 hours as needed for Wheezing or Shortness of Breath    CURRENT MEDS :  Scheduled Meds:   furosemide  40 mg IntraVENous BID    desmopressin  200 mcg Oral BID    dilTIAZem  30 mg Oral TID    escitalopram  10 mg Oral BID    famotidine  20 mg Oral Daily    ferrous sulfate  325 mg Oral BID WC    levothyroxine  75 mcg Oral QAM AC    metoprolol succinate  100 mg Oral Daily    predniSONE  5 mg Oral Daily budesonide  0.25 mg Nebulization BID    And    Arformoterol Tartrate  15 mcg Nebulization BID    enoxaparin  40 mg SubCUTAneous Daily    sodium chloride flush  10 mL IntraVENous 2 times per day       Continuous Infusions:   dextrose      dextrose 5 % and 0.45 % NaCl 75 mL/hr at 09/17/22 0017    sodium chloride         No Known Allergies    REVIEW OF SYSTEMS:  Constitutional: Denies fever, weight loss, night sweats, and fatigue  Skin: Denies pigmentation, dark lesions, and rashes   HEENT: Denies hearing loss, tinnitus, ear drainage, epistaxis, sore throat, and hoarseness. Cardiovascular: Denies palpitations, chest pain, and chest pressure. Respiratory: Denies dyspnea at rest, hemoptysis, apnea, and choking. +congested cough  Gastrointestinal: Denies nausea, vomiting, poor appetite, diarrhea, heartburn or reflux  Genitourinary: Denies dysuria, frequency, urgency or hematuria  Musculoskeletal: Denies myalgias and bone pain +generalized weakness  Neurological: Denies dizziness, vertigo, headache, and focal weakness  Psychological: Denies anxiety and depression  Endocrine: Denies heat intolerance and cold intolerance  Hematopoietic/Lymphatic: Denies bleeding problems and blood transfusions    OBJECTIVE:   /63   Pulse 96   Temp 98.4 °F (36.9 °C) (Oral)   Resp 26   Ht 5' (1.524 m)   Wt 181 lb 9.6 oz (82.4 kg)   LMP  (LMP Unknown)   SpO2 100%   BMI 35.47 kg/m²   SpO2 Readings from Last 1 Encounters:   09/17/22 100%        I/O:    Intake/Output Summary (Last 24 hours) at 9/17/2022 1059  Last data filed at 9/16/2022 1356  Gross per 24 hour   Intake --   Output 800 ml   Net -800 ml         CPAP/EPAP: 8 cmH2O     Physical Exam:  General: The patient is lying in bed comfortably without any distress.   Breathing is not labored  HEENT: Pupils are equal round and reactive to light, there are no oral lesions and no post-nasal drip   Neck: supple without adenopathy  Cardiovascular: regular rate and rhythm without MCV 87.0 09/15/2022 11:32 AM    MCH 25.1 09/15/2022 11:32 AM    MCHC 28.9 09/15/2022 11:32 AM    RDW 14.9 09/15/2022 11:32 AM     09/15/2022 11:32 AM    MPV 10.6 09/15/2022 11:32 AM     Lab Results   Component Value Date/Time     09/17/2022 08:01 AM    K 4.3 09/17/2022 08:01 AM    CL 97 09/17/2022 08:01 AM    CO2 26 09/17/2022 08:01 AM    BUN 15 09/17/2022 08:01 AM    CREATININE 1.6 09/17/2022 08:01 AM    LABALBU 2.9 09/13/2022 06:05 AM    LABALBU 3.6 05/02/2012 07:40 PM    CALCIUM 8.4 09/17/2022 08:01 AM    GFRAA 39 09/17/2022 08:01 AM    LABGLOM 39 09/17/2022 08:01 AM     Lab Results   Component Value Date/Time    PROTIME 11.0 03/11/2022 11:29 AM    PROTIME 12.7 05/02/2012 07:40 PM    INR 1.0 03/11/2022 11:29 AM     Recent Labs     09/17/22  0801   PROBNP 13,418*     No results for input(s): TROPONINI in the last 72 hours. Recent Labs     09/14/22  1604   PROCAL 0.09*     This SmartLink has not been configured with any valid records. Micro:  9/12 urine culture negative    Assessment:  Acute on chronic respiratory failure with hypoxia  GI bleed  Pulmonary sarcoidosis  Severe pulmonary hypertension  Stage III severe COPD by Gold classification  Acute on chronic congestive heart failure  Chronic dyspnea  Lenawee's disease  Diabetes  Stage III chronic kidney disease  History of nicotine dependence  PAF  Obesity, BMI 35  Probable LINDSAY  Debility    Plan:  Wean oxygen as tolerated keep SpO2 between 88-92%. Baseline 1.5 L at home  AVAPS at bedtime and as needed with daytime naps as mentation has improved. Wean FiO2 as tolerated  Bronchodilators: Brovana/Pulmicort twice daily, albuterol as needed  Agree with diuresis: Lasix 40 mg IV twice daily per PCP. proBNP 13,480. IV fluids x12 hours per PCP. Monitor I+O, 800cc  Chest imaging showed pulmonary edema.  Will follow am imaging  Chronic prednisone 5 mg daily  GI/DVT prophylaxis      Thank you for allowing me to participate in the care of Rebekah YOUSIF Trevin Alex. Please feel free to call with questions. This plan of care was reviewed in collaboration with Dr. Светлана Anna    Electronically signed by FRIDA Carr CNP on 9/17/2022 at 11:00 AM      Note: This report was completed utilizing computer voice recognition software. Every effort has been made to ensure accuracy, however; inadvertent computerized transcription errors may be present        I personally saw, examined, and cared for the patient. Labs, medications, radiographs reviewed. I agree with history exam and plans detailed in NP note. Continue NIV. Continue diuresis.     Marino Bernstein, DO

## 2022-09-17 NOTE — SIGNIFICANT EVENT
Critical Care - Rapid Response Team Note      Date of event: 9/16/2022   Time of event: Ke Hunt 73y.o. year old female   YOB: 1955     PCP:  Snow Rice MD   Location: [facility]   Witnessed? : [x]Yes  [] No  Initial Code status: [x] Full  [] DNR-CCA  []DNR-CC    []Limited  ______________________________________________________________________  Reason for RRT:   Altered Mental Status    Chief Complaint for this admission:   Chief Complaint   Patient presents with    Rectal Bleeding     Bright red rectal bleeding started few hours ago. Pt at [address] yesterday for umbilical hernia       Admit date:  9/12/2022     Admitting Diagnosis: UTI (urinary tract infection) [N39.0]      Current Diagnosis: The primary encounter diagnosis was Acute cystitis with hematuria. Diagnoses of IRMA (acute kidney injury) ([facility] Umbilical hernia without obstruction and without gangrene, and Generalized abdominal pain were also pertinent to this visit. Subjective:   Upon arrival of the RRT team, patient was altered, and confused. Blood sugar was checked and found to be 44, ABG was done, CO2 retention 72 above his baseline of 50-55. AVAPS was ordered, respiratory therapist was called to the bedside, and patient was given 1 mg of glucagon IM and 1 amp of D50.        Objective:   Initial Assessment on arrival:  Vital signs:, BP: 125/85, HR: 102 SpO2:95%    Airway and Condition: Conscious noted, but confused    Lungs And Circulation: clear to auscultation bilaterally noted                  Neurologic: does not follow commands noted      Initial Interventions: Labs ordered: CBC, CMP, LA  Imaging ordered: none  Meds/Fluids/Rx given:   1 mg of glucagon IM and 1 amp of D50  AVAPS       Repeat ABG in 2 hrs showing CO2 down to 64, patient AO x3,     RRT Assessment and Plan:    Bethanne Dubin is a 73 y.o. female with  has a past medical history of A-fib ([facility] [address] Able to transfer from chair to wheelchair, Abnormal mammogram, Acute on chronic respiratory failure (HCC), Anemia, Anemia due to chronic illness, Ankle fracture, left, 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[facility]), Backache, Benign hypertension, CHF (congestive heart failure) ([facility]), Chronic back pain, Chronic kidney disease, Chronic pain disorder, Chronic, continuous use of opioids, Compression fracture of thoracic vertebra ([facility]), COPD (chronic obstructive pulmonary disease) ([address] Debility, Deformity of ankle and foot, acquired, Depression, Diabetes insipidus ([address] Diverticulitis, Dry eyes, Encephalopathy acute, Gait disturbance, GERD (gastroesophageal reflux disease), Hemorrhoids, Hernia, History of blood transfusion, History of Clostridium difficile, Hyperlipidemia, Hypothyroidism, Ischemic colitis, enteritis, or enterocolitis ([address] Long term (current) use of systemic steroids, Long-term current use of steroids, Lumbar disc herniation, Myalgia and myositis, unspecified, Nephrosclerosis, Nonunion of fracture, Osteoarthritis, PAF (paroxysmal atrial fibrillation) ([address] 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[address] Pulmonary hypertension ([address] Pulmonary sarcoidosis ([address] Rectal bleeding, Rhabdomyolysis, Steroid-induced avascular necrosis of shoulder ([address] Tibia fracture, and Ventral hernia without obstruction or gangrene. who was admitted on 9/12/2022 with admitting diagnosis UTI (urinary tract infection) [N39.0]  . RRT was called on 9/16/2022 . Initial assessment and interventions as noted above.      Current problems include:   AMS 2/2 hypoglycemia and hypercapnia  Hx of sarcoidosis stage IV and pulmonary hypertension    Plan:   AVAPS  Glucocan and D50  Telemetry and intermediate floor  Consider pulmonary consultation, defer to primary care team  ?    Code status: [x] Full  [] DNR-CCA  []DNR-CC []Limited    Disposition:  [] No transfer   [x] Transfer to monitor floor, patient was not physically transferred to another floor, her current room can be fitted with equipment and nursing staff to become and telemetry room/intermediate floor service  [] Transfer to: [] MICU [] NICU [] CVICU [] SICU    Patients family updated:     [x] Yes  [] No   Discussed with:  [] Critical Care Intensivist:         [x] Primary Care Provider: Dr. Rena Duarte      [] Other: ?    Dyann Canavan, MD PGY-3  9/16/2022 10:47 PM  Attending Physician: Dr Rena Duarte

## 2022-09-17 NOTE — PROGRESS NOTES
Hospitalist Progress Note      PCP: Eddie Fletcher MD    Date of Admission: 9/12/2022    Chief Complaint: Bright red blood per rectum, UTI, IRMA    Hospital Course:   Patient presented to the emergency department with abdominal pain and bright red blood per rectum. ER physician noted bright red streak of blood on rectal exam.  Vital signs within normal limits and stable. Laboratory studies demonstrate BUN 20, creatinine 2.0, hemoglobin 10.1. Urinalysis possible urinary tract infection. Patient given a dose of ceftriaxone. Given IV fluids for acute renal failure. Medicine consulted for admission. Subjective:   Patient was seen at bedside at this morning and mentions her diarrhea has improved. Currently on the BiPAP. Chest x-ray results reviewed with her.     Medications:  Reviewed    Infusion Medications    dextrose      sodium chloride       Scheduled Medications    furosemide  40 mg IntraVENous BID    guaiFENesin  400 mg Oral TID    desmopressin  200 mcg Oral BID    dilTIAZem  30 mg Oral TID    escitalopram  10 mg Oral BID    famotidine  20 mg Oral Daily    ferrous sulfate  325 mg Oral BID WC    levothyroxine  75 mcg Oral QAM AC    metoprolol succinate  100 mg Oral Daily    predniSONE  5 mg Oral Daily    budesonide  0.25 mg Nebulization BID    And    Arformoterol Tartrate  15 mcg Nebulization BID    enoxaparin  40 mg SubCUTAneous Daily    sodium chloride flush  10 mL IntraVENous 2 times per day     PRN Meds: white petrolatum, bismuth subsalicylate, potassium chloride **OR** potassium alternative oral replacement **OR** potassium chloride, glucose, dextrose bolus **OR** dextrose bolus, glucagon (rDNA), dextrose, promethazine-codeine, loperamide, iopamidol, oxyCODONE-acetaminophen, albuterol, sodium chloride flush, sodium chloride, [DISCONTINUED] promethazine **OR** ondansetron, polyethylene glycol, acetaminophen **OR** acetaminophen      Intake/Output Summary (Last 24 hours) at 9/17/2022 1146  Last data filed at 9/16/2022 1356  Gross per 24 hour   Intake --   Output 800 ml   Net -800 ml         Exam:    /63   Pulse 96   Temp 98.4 °F (36.9 °C) (Oral)   Resp 26   Ht 5' (1.524 m)   Wt 181 lb 9.6 oz (82.4 kg)   LMP  (LMP Unknown)   SpO2 100%   BMI 35.47 kg/m²     General appearance: Obese, appears stated age and cooperative. HEENT: Pupils equal, round, and reactive to light. Conjunctivae/corneas clear. Neck: Supple, with full range of motion. No jugular venous distention. Trachea midline. Respiratory:  Normal respiratory effort. Clear to auscultation, bilaterally without Rales/Wheezes/Rhonchi. Cardiovascular: Regular rate and rhythm with normal S1/S2 without murmurs, rubs or gallops. Abdomen: Soft, non-tender, non-distended with normal bowel sounds. hernia noted with protruding mass on the left side of the abdomen, Mild tenderness to palpation-LLQ  Musculoskeletal: No clubbing, cyanosis or edema bilaterally. Full range of motion without deformity. Skin: Skin color, texture, turgor normal.  No rashes or lesions. Left-sided abdominal mass/hernia with skin ulceration oblique-chronic nonhealing- does not look infected  Neurologic: No focal deficit, alert and oriented      Labs:   Recent Labs     09/15/22  1132   WBC 4.3*   HGB 9.7*   HCT 33.6*        Recent Labs     09/16/22  0508 09/16/22  2159 09/17/22  0801    133 135   K 3.4* 4.7 4.3   CL 95* 98 97*   CO2 27 17* 26   BUN 12 12 15   CREATININE 1.4* 1.3* 1.6*   CALCIUM 8.5* 8.3* 8.4*     No results for input(s): AST, ALT, BILIDIR, BILITOT, ALKPHOS in the last 72 hours. No results for input(s): INR in the last 72 hours. No results for input(s): Fadia Horn in the last 72 hours.     Assessment/Plan:    Active Hospital Problems    Diagnosis Date Noted    UTI (urinary tract infection) [N39.0] 09/12/2022     Priority: Medium   -Urinary tract infection  -IRMA  -Bright red blood per rectum  -History of atrial fibrillation  -COPD with chronic CO2 Retention  - oBESE- Class I   -HFpEF  -Moderate mitral regurgitation  -Severe pulmonary hypertension  -Moderate tricuspid regurgitation    PLAN:  - Urine culture-neg-<10,0000- -Ceftriaxone discontinued  - Monitor renal function avoid nephrotoxic medications  - diarrhea- improved  - Chest x-ray reviewed and shows bilateral pulmonary edema with pleural effusions  - Hypercapnic on the ABG, currently on BiPAP  - Pulmonology consulted  -proBNP ordered and shows to be elevated at 30,480  -Lasix has been increased from 20 mg to 40 mg twice daily  -Echocardiogram reviewed from July 2022-shows to have an ejection fraction of 60%. Limited ECHO will be ordered. - IRMA-creatinine at 1.6 possibly from the diarrhea and Lasix. Continue IV fluids  -Monitor hemoglobin levels-  Hemoglobin stable at 9.7.  -Transfuse for hemoglobin less than 7.0  -Consider general surgery consult if significant drop in hemoglobin. Currently stable      DVT Prophylaxis: Lovenox  Diet: ADULT DIET; Regular; Low Sodium (2 gm)  ADULT ORAL NUTRITION SUPPLEMENT; Breakfast, Dinner;  Other Oral Supplement; [id number] High Protein Jello  Code Status: Full Code      Dispo -pending clinical improvement- diarrhea,  monitoring hemoglobin    Rei Wright MD

## 2022-09-18 ENCOUNTER — APPOINTMENT (OUTPATIENT)
Dept: GENERAL RADIOLOGY | Age: 67
DRG: 682 | End: 2022-09-18
Payer: MEDICARE

## 2022-09-18 LAB
ANION GAP SERPL CALCULATED.3IONS-SCNC: 11 MMOL/L (ref 7–16)
BASOPHILS ABSOLUTE: 0.03 E9/L (ref 0–0.2)
BASOPHILS RELATIVE PERCENT: 0.6 % (ref 0–2)
BUN BLDV-MCNC: 16 MG/DL (ref 6–23)
CALCIUM SERPL-MCNC: 8.6 MG/DL (ref 8.6–10.2)
CHLORIDE BLD-SCNC: 97 MMOL/L (ref 98–107)
CO2: 26 MMOL/L (ref 22–29)
CREAT SERPL-MCNC: 1.8 MG/DL (ref 0.5–1)
EOSINOPHILS ABSOLUTE: 0.28 E9/L (ref 0.05–0.5)
EOSINOPHILS RELATIVE PERCENT: 6 % (ref 0–6)
GFR AFRICAN AMERICAN: 34
GFR NON-AFRICAN AMERICAN: 34 ML/MIN/1.73
GLUCOSE BLD-MCNC: 83 MG/DL (ref 74–99)
HCT VFR BLD CALC: 33.2 % (ref 34–48)
HEMOGLOBIN: 9.9 G/DL (ref 11.5–15.5)
IMMATURE GRANULOCYTES #: 0.03 E9/L
IMMATURE GRANULOCYTES %: 0.6 % (ref 0–5)
LYMPHOCYTES ABSOLUTE: 0.74 E9/L (ref 1.5–4)
LYMPHOCYTES RELATIVE PERCENT: 15.8 % (ref 20–42)
MCH RBC QN AUTO: 25.8 PG (ref 26–35)
MCHC RBC AUTO-ENTMCNC: 29.8 % (ref 32–34.5)
MCV RBC AUTO: 86.5 FL (ref 80–99.9)
METER GLUCOSE: 94 MG/DL (ref 74–99)
MONOCYTES ABSOLUTE: 0.75 E9/L (ref 0.1–0.95)
MONOCYTES RELATIVE PERCENT: 16 % (ref 2–12)
NEUTROPHILS ABSOLUTE: 2.86 E9/L (ref 1.8–7.3)
NEUTROPHILS RELATIVE PERCENT: 61 % (ref 43–80)
PDW BLD-RTO: 15.5 FL (ref 11.5–15)
PLATELET # BLD: 224 E9/L (ref 130–450)
PMV BLD AUTO: 10.5 FL (ref 7–12)
POTASSIUM REFLEX MAGNESIUM: 4.4 MMOL/L (ref 3.5–5)
RBC # BLD: 3.84 E12/L (ref 3.5–5.5)
SODIUM BLD-SCNC: 134 MMOL/L (ref 132–146)
WBC # BLD: 4.7 E9/L (ref 4.5–11.5)

## 2022-09-18 PROCEDURE — 94640 AIRWAY INHALATION TREATMENT: CPT

## 2022-09-18 PROCEDURE — 6360000002 HC RX W HCPCS: Performed by: FAMILY MEDICINE

## 2022-09-18 PROCEDURE — 6360000002 HC RX W HCPCS: Performed by: STUDENT IN AN ORGANIZED HEALTH CARE EDUCATION/TRAINING PROGRAM

## 2022-09-18 PROCEDURE — 2580000003 HC RX 258: Performed by: FAMILY MEDICINE

## 2022-09-18 PROCEDURE — 71045 X-RAY EXAM CHEST 1 VIEW: CPT

## 2022-09-18 PROCEDURE — 6370000000 HC RX 637 (ALT 250 FOR IP)

## 2022-09-18 PROCEDURE — 80048 BASIC METABOLIC PNL TOTAL CA: CPT

## 2022-09-18 PROCEDURE — 94660 CPAP INITIATION&MGMT: CPT

## 2022-09-18 PROCEDURE — 2700000000 HC OXYGEN THERAPY PER DAY

## 2022-09-18 PROCEDURE — 36415 COLL VENOUS BLD VENIPUNCTURE: CPT

## 2022-09-18 PROCEDURE — 85025 COMPLETE CBC W/AUTO DIFF WBC: CPT

## 2022-09-18 PROCEDURE — 6370000000 HC RX 637 (ALT 250 FOR IP): Performed by: FAMILY MEDICINE

## 2022-09-18 PROCEDURE — 6370000000 HC RX 637 (ALT 250 FOR IP): Performed by: STUDENT IN AN ORGANIZED HEALTH CARE EDUCATION/TRAINING PROGRAM

## 2022-09-18 PROCEDURE — 82962 GLUCOSE BLOOD TEST: CPT

## 2022-09-18 PROCEDURE — 1200000000 HC SEMI PRIVATE

## 2022-09-18 RX ADMIN — Medication 10 ML: at 20:43

## 2022-09-18 RX ADMIN — FUROSEMIDE 40 MG: 10 INJECTION, SOLUTION INTRAMUSCULAR; INTRAVENOUS at 17:49

## 2022-09-18 RX ADMIN — OXYCODONE AND ACETAMINOPHEN 2 TABLET: 5; 325 TABLET ORAL at 10:15

## 2022-09-18 RX ADMIN — ESCITALOPRAM OXALATE 10 MG: 10 TABLET ORAL at 20:43

## 2022-09-18 RX ADMIN — DILTIAZEM HYDROCHLORIDE 30 MG: 30 TABLET, FILM COATED ORAL at 04:18

## 2022-09-18 RX ADMIN — ARFORMOTEROL TARTRATE 15 MCG: 15 SOLUTION RESPIRATORY (INHALATION) at 08:04

## 2022-09-18 RX ADMIN — FAMOTIDINE 20 MG: 20 TABLET ORAL at 10:15

## 2022-09-18 RX ADMIN — DILTIAZEM HYDROCHLORIDE 30 MG: 30 TABLET, FILM COATED ORAL at 10:15

## 2022-09-18 RX ADMIN — DESMOPRESSIN ACETATE 200 MCG: 0.1 TABLET ORAL at 10:16

## 2022-09-18 RX ADMIN — FERROUS SULFATE TAB 325 MG (65 MG ELEMENTAL FE) 325 MG: 325 (65 FE) TAB at 10:15

## 2022-09-18 RX ADMIN — GUAIFENESIN 400 MG: 400 TABLET ORAL at 20:43

## 2022-09-18 RX ADMIN — GUAIFENESIN 400 MG: 400 TABLET ORAL at 14:26

## 2022-09-18 RX ADMIN — OXYCODONE AND ACETAMINOPHEN 2 TABLET: 5; 325 TABLET ORAL at 04:26

## 2022-09-18 RX ADMIN — ENOXAPARIN SODIUM 40 MG: 100 INJECTION SUBCUTANEOUS at 10:14

## 2022-09-18 RX ADMIN — FUROSEMIDE 40 MG: 10 INJECTION, SOLUTION INTRAMUSCULAR; INTRAVENOUS at 10:13

## 2022-09-18 RX ADMIN — Medication 10 ML: at 10:16

## 2022-09-18 RX ADMIN — LOPERAMIDE HYDROCHLORIDE 2 MG: 2 CAPSULE ORAL at 10:15

## 2022-09-18 RX ADMIN — METOPROLOL SUCCINATE 100 MG: 100 TABLET, FILM COATED, EXTENDED RELEASE ORAL at 10:14

## 2022-09-18 RX ADMIN — DILTIAZEM HYDROCHLORIDE 30 MG: 30 TABLET, FILM COATED ORAL at 20:43

## 2022-09-18 RX ADMIN — FERROUS SULFATE TAB 325 MG (65 MG ELEMENTAL FE) 325 MG: 325 (65 FE) TAB at 17:49

## 2022-09-18 RX ADMIN — ARFORMOTEROL TARTRATE 15 MCG: 15 SOLUTION RESPIRATORY (INHALATION) at 19:41

## 2022-09-18 RX ADMIN — BUDESONIDE 250 MCG: 0.25 SUSPENSION RESPIRATORY (INHALATION) at 08:05

## 2022-09-18 RX ADMIN — PREDNISONE 5 MG: 5 TABLET ORAL at 10:16

## 2022-09-18 RX ADMIN — LEVOTHYROXINE SODIUM 75 MCG: 0.07 TABLET ORAL at 06:24

## 2022-09-18 RX ADMIN — BUDESONIDE 250 MCG: 0.25 SUSPENSION RESPIRATORY (INHALATION) at 19:42

## 2022-09-18 RX ADMIN — DESMOPRESSIN ACETATE 200 MCG: 0.1 TABLET ORAL at 20:43

## 2022-09-18 RX ADMIN — GUAIFENESIN 400 MG: 400 TABLET ORAL at 10:14

## 2022-09-18 RX ADMIN — ESCITALOPRAM OXALATE 10 MG: 10 TABLET ORAL at 10:15

## 2022-09-18 ASSESSMENT — PAIN SCALES - GENERAL: PAINLEVEL_OUTOF10: 9

## 2022-09-18 NOTE — PROGRESS NOTES
Hospitalist Progress Note      PCP: Adelina Licea MD    Date of Admission: 9/12/2022    Chief Complaint: Bright red blood per rectum, UTI, IRMA    Hospital Course:   Patient presented to the emergency department with abdominal pain and bright red blood per rectum. ER physician noted bright red streak of blood on rectal exam.  Vital signs within normal limits and stable. Laboratory studies demonstrate BUN 20, creatinine 2.0, hemoglobin 10.1. Urinalysis possible urinary tract infection. Patient given a dose of ceftriaxone. Given IV fluids for acute renal failure. Medicine consulted for admission. Subjective:   Patient was seen at bedside at this morning and mentions her diarrhea has improved. Currently off the BIPAP on RA during my eval.  Chest x-ray results reviewed with her. Discussed regarding her IRMA, Improved with her mentation.     Medications:  Reviewed    Infusion Medications    dextrose      sodium chloride       Scheduled Medications    furosemide  40 mg IntraVENous BID    guaiFENesin  400 mg Oral TID    desmopressin  200 mcg Oral BID    dilTIAZem  30 mg Oral TID    escitalopram  10 mg Oral BID    famotidine  20 mg Oral Daily    ferrous sulfate  325 mg Oral BID WC    levothyroxine  75 mcg Oral QAM AC    metoprolol succinate  100 mg Oral Daily    predniSONE  5 mg Oral Daily    budesonide  0.25 mg Nebulization BID    And    Arformoterol Tartrate  15 mcg Nebulization BID    enoxaparin  40 mg SubCUTAneous Daily    sodium chloride flush  10 mL IntraVENous 2 times per day     PRN Meds: perflutren lipid microspheres, white petrolatum, bismuth subsalicylate, potassium chloride **OR** potassium alternative oral replacement **OR** potassium chloride, glucose, dextrose bolus **OR** dextrose bolus, glucagon (rDNA), dextrose, promethazine-codeine, loperamide, iopamidol, oxyCODONE-acetaminophen, albuterol, sodium chloride flush, sodium chloride, [DISCONTINUED] promethazine **OR** ondansetron, polyethylene glycol, acetaminophen **OR** acetaminophen    No intake or output data in the 24 hours ending 09/18/22 1033        Exam:    /78   Pulse 63   Temp 97 °F (36.1 °C) (Axillary)   Resp 20   Ht 5' (1.524 m)   Wt 181 lb 9.6 oz (82.4 kg)   LMP  (LMP Unknown)   SpO2 95%   BMI 35.47 kg/m²     General appearance: Obese, appears stated age and cooperative. HEENT: Pupils equal, round, and reactive to light. Conjunctivae/corneas clear. Neck: Supple, with full range of motion. No jugular venous distention. Trachea midline. Respiratory:  Normal respiratory effort. Clear to auscultation, bilaterally without Rales/Wheezes/Rhonchi. Cardiovascular: Regular rate and rhythm with normal S1/S2 without murmurs, rubs or gallops. Abdomen: Soft, non-tender, non-distended with normal bowel sounds. hernia noted with protruding mass on the left side of the abdomen, Mild tenderness to palpation-LLQ  Musculoskeletal: No clubbing, cyanosis or edema bilaterally. Full range of motion without deformity. Skin: Skin color, texture, turgor normal.  No rashes or lesions. Left-sided abdominal mass/hernia with skin ulceration oblique-chronic nonhealing- does not look infected  Neurologic: No focal deficit, alert and oriented      Labs:   Recent Labs     09/15/22  1132   WBC 4.3*   HGB 9.7*   HCT 33.6*        Recent Labs     09/16/22  2159 09/17/22  0801 09/18/22  0433    135 134   K 4.7 4.3 4.4   CL 98 97* 97*   CO2 17* 26 26   BUN 12 15 16   CREATININE 1.3* 1.6* 1.8*   CALCIUM 8.3* 8.4* 8.6     No results for input(s): AST, ALT, BILIDIR, BILITOT, ALKPHOS in the last 72 hours. No results for input(s): INR in the last 72 hours. No results for input(s): Mary Mould in the last 72 hours.     Assessment/Plan:    Active Hospital Problems    Diagnosis Date Noted    UTI (urinary tract infection) [N39.0] 09/12/2022     Priority: Medium   -Urinary tract infection  -IRMA worsening 2/2 Diuresis  -Bright red blood per rectum  -History of atrial fibrillation  -COPD with chronic CO2 Retention  - oBESE- Class I   -HFpEF  -Moderate mitral regurgitation  -Severe pulmonary hypertension  -Moderate tricuspid regurgitation    PLAN:  - Urine culture-neg-<10,0000- -Ceftriaxone discontinued  - Monitor renal function avoid nephrotoxic medications  - diarrhea- improved  - Chest x-ray reviewed and shows bilateral pulmonary edema with pleural effusions  - Pulmonology consulted- agree with diuresis  -proBNP ordered and shows to be elevated at 30,480  -Lasix has been increased from 20 mg to 40 mg twice daily  -Echocardiogram reviewed from July 2022-shows to have an ejection fraction of 60%. Limited ECHO will be ordered. - IRMA-creatinine at 1.8  possibly from the diarrhea and Lasix. Will consult nephrology if does not improve after holding lasix. -Monitor hemoglobin levels-  Hemoglobin stable at 9.7. cbc pending today  -Transfuse for hemoglobin less than 7.0  -Consider general surgery consult if significant drop in hemoglobin. Currently stable      DVT Prophylaxis: Lovenox  Diet: ADULT DIET; Regular; Low Sodium (2 gm)  ADULT ORAL NUTRITION SUPPLEMENT; Breakfast, Dinner;  Other Oral Supplement; [id number] High Protein Jello  Code Status: Full Code      Dispo -pending clinical improvement- diarrhea,  monitoring hemoglobin    Jaye Araiza MD

## 2022-09-18 NOTE — PROGRESS NOTES
Date: 9/17/2022    Time: 11:33 PM    Patient Placed On BIPAP/CPAP/ Non-Invasive Ventilation? Yes    If no must comment. Facial area red/color change? No           If YES are Blister/Lesion present? No   If yes must notify nursing staff  BIPAP/CPAP skin barrier?   Yes    Skin barrier type:mepilexlite       Comments:        Abdulkadir Wadsworth RCP

## 2022-09-18 NOTE — PROGRESS NOTES
Destiny Tillman M.D.,[facility]  Kate Cordova D.O., F.A.C.ORobI., Kingston Meeks M.D. Travis Castañeda M.D. Brigid Weinstein D.O. Daily Pulmonary Progress Note    Patient:  Errol Miller 73 y.o. female MRN: [id number]     Date of Service: 9/18/2022      Synopsis     We are following patient for respiratory failure, sarcoidosis    \"CC\" rectal bleeding    Code status: Full      Subjective      Patient was seen and examined. Sitting up in bed eating breakfast on 4 L nasal cannula no acute distress. Tolerating PAP therapy overnight. Discussed chest imaging with patient. Patient without respiratory complaints at this time, states breathing is \"good\". Review of Systems:  Constitutional: Denies fever, weight loss, night sweats, and fatigue  Skin: Denies pigmentation, dark lesions, and rashes   HEENT: Denies hearing loss, tinnitus, ear drainage, epistaxis, sore throat, and hoarseness. Cardiovascular: Denies palpitations, chest pain, and chest pressure. Respiratory: Denies cough, dyspnea at rest, hemoptysis, apnea, and choking.   Gastrointestinal: Denies nausea, vomiting, poor appetite, diarrhea, heartburn or reflux  Genitourinary: Denies dysuria, frequency, urgency or hematuria  Musculoskeletal: Denies myalgias, muscle weakness, and bone pain  Neurological: Denies dizziness, vertigo, headache, and focal weakness  Psychological: Denies anxiety and depression  Endocrine: Denies heat intolerance and cold intolerance  Hematopoietic/Lymphatic: Denies bleeding problems and blood transfusions    24-hour events:  None    Objective   Vitals: /78   Pulse 63   Temp 97 °F (36.1 °C) (Axillary)   Resp 20   Ht 5' (1.524 m)   Wt 181 lb 9.6 oz (82.4 kg)   LMP  (LMP Unknown)   SpO2 95%   BMI 35.47 kg/m²     I/O:  No intake or output data in the 24 hours ending 09/18/22 1131        CPAP/EPAP: 8 cmH2O     CURRENT MEDS :  Scheduled Meds:   furosemide  40 mg IntraVENous BID    guaiFENesin  400 mg Oral TID significant pericardial effusion. Labs:  Lab Results   Component Value Date/Time    WBC 4.3 09/15/2022 11:32 AM    HGB 9.7 09/15/2022 11:32 AM    HCT 33.6 09/15/2022 11:32 AM    MCV 87.0 09/15/2022 11:32 AM    MCH 25.1 09/15/2022 11:32 AM    MCHC 28.9 09/15/2022 11:32 AM    RDW 14.9 09/15/2022 11:32 AM     09/15/2022 11:32 AM    MPV 10.6 09/15/2022 11:32 AM     Lab Results   Component Value Date/Time     09/18/2022 04:33 AM    K 4.4 09/18/2022 04:33 AM    CL 97 09/18/2022 04:33 AM    CO2 26 09/18/2022 04:33 AM    BUN 16 09/18/2022 04:33 AM    CREATININE 1.8 09/18/2022 04:33 AM    LABALBU 2.9 09/13/2022 06:05 AM    LABALBU 3.6 05/02/2012 07:40 PM    CALCIUM 8.6 09/18/2022 04:33 AM    GFRAA 34 09/18/2022 04:33 AM    LABGLOM 34 09/18/2022 04:33 AM     Lab Results   Component Value Date/Time    PROTIME 11.0 03/11/2022 11:29 AM    PROTIME 12.7 05/02/2012 07:40 PM    INR 1.0 03/11/2022 11:29 AM     Recent Labs     09/17/22  0801   PROBNP 13,418*     No results for input(s): PROCAL in the last 72 hours. This SmartLink has not been configured with any valid records. Micro:  Nothing new     Assessment:    Acute on chronic respiratory failure with hypoxia  GI bleed  Pulmonary sarcoidosis  Severe pulmonary hypertension  Stage III severe COPD by Gold classification  Acute on chronic congestive heart failure  Chronic dyspnea  Rogelio's disease  Diabetes  Stage III chronic kidney disease  History of nicotine dependence  PAF  Obesity, BMI 35  Probable LINDSAY  Debility    Plan:   Wean oxygen as tolerated keep SpO2 between 88-92%, on 4L NC. Baseline 1.5 L at home  AVAPS at bedtime and as needed with daytime naps as mentation has improved. Wean FiO2 as tolerated  Bronchodilators: Brovana/Pulmicort twice daily, albuterol as needed  Continue diuresis: Lasix 40 mg IV twice daily per PCP. Monitor I+O, no output documented. Follow renal function, lytes.  Creatinine 1.8  Chest imaging with improved pulmonary edema  Chronic prednisone 5 mg daily  GI/DVT prophylaxis      Electronically signed by FRIDA Villavicencio CNP on 9/18/2022 at 11:31 AM

## 2022-09-18 NOTE — PLAN OF CARE
Problem: Safety - Adult  Goal: Free from fall injury  9/18/2022 0309 by Js Kimball RN  Outcome: Progressing  9/18/2022 0104 by Ernie Gandara RN  Outcome: Progressing

## 2022-09-19 LAB
ANION GAP SERPL CALCULATED.3IONS-SCNC: 12 MMOL/L (ref 7–16)
ANION GAP SERPL CALCULATED.3IONS-SCNC: 13 MMOL/L (ref 7–16)
BUN BLDV-MCNC: 18 MG/DL (ref 6–23)
BUN BLDV-MCNC: 19 MG/DL (ref 6–23)
CALCIUM SERPL-MCNC: 8.7 MG/DL (ref 8.6–10.2)
CALCIUM SERPL-MCNC: 8.9 MG/DL (ref 8.6–10.2)
CHLORIDE BLD-SCNC: 95 MMOL/L (ref 98–107)
CHLORIDE BLD-SCNC: 97 MMOL/L (ref 98–107)
CO2: 25 MMOL/L (ref 22–29)
CO2: 28 MMOL/L (ref 22–29)
CREAT SERPL-MCNC: 1.8 MG/DL (ref 0.5–1)
CREAT SERPL-MCNC: 1.9 MG/DL (ref 0.5–1)
EKG ATRIAL RATE: 66 BPM
EKG Q-T INTERVAL: 404 MS
EKG QRS DURATION: 86 MS
EKG QTC CALCULATION (BAZETT): 496 MS
EKG R AXIS: 76 DEGREES
EKG T AXIS: 122 DEGREES
EKG VENTRICULAR RATE: 91 BPM
GFR AFRICAN AMERICAN: 32
GFR AFRICAN AMERICAN: 34
GFR NON-AFRICAN AMERICAN: 32 ML/MIN/1.73
GFR NON-AFRICAN AMERICAN: 34 ML/MIN/1.73
GLUCOSE BLD-MCNC: 107 MG/DL (ref 74–99)
GLUCOSE BLD-MCNC: 95 MG/DL (ref 74–99)
METER GLUCOSE: 131 MG/DL (ref 74–99)
OSMOLALITY: 282 MOSM/KG (ref 285–310)
POTASSIUM REFLEX MAGNESIUM: 4.1 MMOL/L (ref 3.5–5)
POTASSIUM SERPL-SCNC: 3.7 MMOL/L (ref 3.5–5)
PRO-BNP: 8729 PG/ML (ref 0–125)
SODIUM BLD-SCNC: 133 MMOL/L (ref 132–146)
SODIUM BLD-SCNC: 137 MMOL/L (ref 132–146)

## 2022-09-19 PROCEDURE — 82962 GLUCOSE BLOOD TEST: CPT

## 2022-09-19 PROCEDURE — 6370000000 HC RX 637 (ALT 250 FOR IP)

## 2022-09-19 PROCEDURE — 6370000000 HC RX 637 (ALT 250 FOR IP): Performed by: FAMILY MEDICINE

## 2022-09-19 PROCEDURE — 83880 ASSAY OF NATRIURETIC PEPTIDE: CPT

## 2022-09-19 PROCEDURE — 2700000000 HC OXYGEN THERAPY PER DAY

## 2022-09-19 PROCEDURE — 83930 ASSAY OF BLOOD OSMOLALITY: CPT

## 2022-09-19 PROCEDURE — 94660 CPAP INITIATION&MGMT: CPT

## 2022-09-19 PROCEDURE — 94640 AIRWAY INHALATION TREATMENT: CPT

## 2022-09-19 PROCEDURE — 6360000002 HC RX W HCPCS: Performed by: FAMILY MEDICINE

## 2022-09-19 PROCEDURE — 6370000000 HC RX 637 (ALT 250 FOR IP): Performed by: STUDENT IN AN ORGANIZED HEALTH CARE EDUCATION/TRAINING PROGRAM

## 2022-09-19 PROCEDURE — 2580000003 HC RX 258: Performed by: INTERNAL MEDICINE

## 2022-09-19 PROCEDURE — 80048 BASIC METABOLIC PNL TOTAL CA: CPT

## 2022-09-19 PROCEDURE — 2580000003 HC RX 258: Performed by: FAMILY MEDICINE

## 2022-09-19 PROCEDURE — 36415 COLL VENOUS BLD VENIPUNCTURE: CPT

## 2022-09-19 PROCEDURE — 1200000000 HC SEMI PRIVATE

## 2022-09-19 RX ORDER — HYDROCORTISONE 25 MG/G
CREAM TOPICAL 2 TIMES DAILY
Status: DISCONTINUED | OUTPATIENT
Start: 2022-09-19 | End: 2022-09-27 | Stop reason: HOSPADM

## 2022-09-19 RX ORDER — DESMOPRESSIN ACETATE 0.1 MG/1
200 TABLET ORAL NIGHTLY
Status: DISCONTINUED | OUTPATIENT
Start: 2022-09-20 | End: 2022-09-27 | Stop reason: HOSPADM

## 2022-09-19 RX ORDER — SODIUM CHLORIDE 9 MG/ML
INJECTION, SOLUTION INTRAVENOUS CONTINUOUS
Status: DISCONTINUED | OUTPATIENT
Start: 2022-09-19 | End: 2022-09-21

## 2022-09-19 RX ADMIN — SODIUM CHLORIDE: 9 INJECTION, SOLUTION INTRAVENOUS at 16:04

## 2022-09-19 RX ADMIN — ENOXAPARIN SODIUM 40 MG: 100 INJECTION SUBCUTANEOUS at 09:40

## 2022-09-19 RX ADMIN — GUAIFENESIN 400 MG: 400 TABLET ORAL at 09:37

## 2022-09-19 RX ADMIN — Medication 10 ML: at 09:44

## 2022-09-19 RX ADMIN — DILTIAZEM HYDROCHLORIDE 30 MG: 30 TABLET, FILM COATED ORAL at 12:26

## 2022-09-19 RX ADMIN — FERROUS SULFATE TAB 325 MG (65 MG ELEMENTAL FE) 325 MG: 325 (65 FE) TAB at 09:37

## 2022-09-19 RX ADMIN — DILTIAZEM HYDROCHLORIDE 30 MG: 30 TABLET, FILM COATED ORAL at 20:38

## 2022-09-19 RX ADMIN — LEVOTHYROXINE SODIUM 75 MCG: 0.07 TABLET ORAL at 05:14

## 2022-09-19 RX ADMIN — BUDESONIDE 250 MCG: 0.25 SUSPENSION RESPIRATORY (INHALATION) at 08:11

## 2022-09-19 RX ADMIN — ARFORMOTEROL TARTRATE 15 MCG: 15 SOLUTION RESPIRATORY (INHALATION) at 20:09

## 2022-09-19 RX ADMIN — LOPERAMIDE HYDROCHLORIDE 2 MG: 2 CAPSULE ORAL at 19:05

## 2022-09-19 RX ADMIN — ESCITALOPRAM OXALATE 10 MG: 10 TABLET ORAL at 20:38

## 2022-09-19 RX ADMIN — ARFORMOTEROL TARTRATE 15 MCG: 15 SOLUTION RESPIRATORY (INHALATION) at 08:11

## 2022-09-19 RX ADMIN — BUDESONIDE 250 MCG: 0.25 SUSPENSION RESPIRATORY (INHALATION) at 20:09

## 2022-09-19 RX ADMIN — OXYCODONE AND ACETAMINOPHEN 2 TABLET: 5; 325 TABLET ORAL at 09:39

## 2022-09-19 RX ADMIN — FAMOTIDINE 20 MG: 20 TABLET ORAL at 09:38

## 2022-09-19 RX ADMIN — PREDNISONE 5 MG: 5 TABLET ORAL at 09:38

## 2022-09-19 RX ADMIN — HYDROCORTISONE: 25 CREAM TOPICAL at 20:42

## 2022-09-19 RX ADMIN — HYDROCORTISONE: 25 CREAM TOPICAL at 13:49

## 2022-09-19 RX ADMIN — GUAIFENESIN 400 MG: 400 TABLET ORAL at 20:38

## 2022-09-19 RX ADMIN — OXYCODONE AND ACETAMINOPHEN 2 TABLET: 5; 325 TABLET ORAL at 03:49

## 2022-09-19 RX ADMIN — METOPROLOL SUCCINATE 100 MG: 100 TABLET, FILM COATED, EXTENDED RELEASE ORAL at 09:37

## 2022-09-19 RX ADMIN — DILTIAZEM HYDROCHLORIDE 30 MG: 30 TABLET, FILM COATED ORAL at 03:49

## 2022-09-19 RX ADMIN — DESMOPRESSIN ACETATE 200 MCG: 0.1 TABLET ORAL at 09:37

## 2022-09-19 RX ADMIN — OXYCODONE AND ACETAMINOPHEN 2 TABLET: 5; 325 TABLET ORAL at 20:50

## 2022-09-19 RX ADMIN — ESCITALOPRAM OXALATE 10 MG: 10 TABLET ORAL at 09:38

## 2022-09-19 RX ADMIN — GUAIFENESIN 400 MG: 400 TABLET ORAL at 13:50

## 2022-09-19 RX ADMIN — FERROUS SULFATE TAB 325 MG (65 MG ELEMENTAL FE) 325 MG: 325 (65 FE) TAB at 16:00

## 2022-09-19 ASSESSMENT — PAIN SCALES - GENERAL
PAINLEVEL_OUTOF10: 8
PAINLEVEL_OUTOF10: 10

## 2022-09-19 ASSESSMENT — PAIN DESCRIPTION - LOCATION
LOCATION: BACK
LOCATION: ABDOMEN;BACK

## 2022-09-19 ASSESSMENT — PAIN DESCRIPTION - DESCRIPTORS
DESCRIPTORS: ACHING;CRAMPING;BURNING
DESCRIPTORS: ACHING

## 2022-09-19 ASSESSMENT — PAIN DESCRIPTION - ORIENTATION: ORIENTATION: RIGHT;LEFT

## 2022-09-19 NOTE — PLAN OF CARE
Problem: Pain  Goal: Verbalizes/displays adequate comfort level or baseline comfort level  9/18/2022 2354 by Adam Stapleton RN  Outcome: Progressing  9/18/2022 1235 by Amol Zamudio RN  Outcome: Progressing     Problem: Safety - Adult  Goal: Free from fall injury  9/18/2022 2354 by Adam Stapleton RN  Outcome: Progressing  9/18/2022 1235 by Amol Zamudio RN  Outcome: Progressing  Flowsheets (Taken 9/18/2022 1145)  Free From Fall Injury: Instruct family/caregiver on patient safety     Problem: Metabolic/Fluid and Electrolytes - Adult  Goal: Hemodynamic stability and optimal renal function maintained  Outcome: Progressing  Flowsheets (Taken 9/18/2022 1145 by Amol Zamudio RN)  Hemodynamic stability and optimal renal function maintained: Monitor labs and assess for signs and symptoms of volume excess or deficit     Problem: Chronic Conditions and Co-morbidities  Goal: Patient's chronic conditions and co-morbidity symptoms are monitored and maintained or improved  Outcome: Progressing  Flowsheets (Taken 9/18/2022 1145 by Amol Zamudio RN)  Care Plan - Patient's Chronic Conditions and Co-Morbidity Symptoms are Monitored and Maintained or Improved: Monitor and assess patient's chronic conditions and comorbid symptoms for stability, deterioration, or improvement

## 2022-09-19 NOTE — PROGRESS NOTES
Unable to locate Nasal Pillows at this time per Dr. Oral Guaman request. Patient will be given the option to trial a nasal mask instead of a full face mask HS with her BiLEVEL.

## 2022-09-19 NOTE — PROGRESS NOTES
Date: 9/19/2022    Time: 12:11 AM    Patient Placed On BIPAP/CPAP/ Non-Invasive Ventilation? Pt already on bipap    If no must comment. Facial area red/color change? No           If YES are Blister/Lesion present? No   If yes must notify nursing staff  BIPAP/CPAP skin barrier?   Yes    Skin barrier type:mepilexlite       Comments:        Gideon Saini RCP

## 2022-09-19 NOTE — CONSULTS
Department of Internal Medicine  Nephrology Attending Consult Note      Reason for Consult:  IRMA on CKD  Requesting Physician:  Dr. Riri Mckinney:  Abdominal pain    History Obtained From:  patient, electronic medical record    HISTORY OF PRESENT ILLNESS:  Mrs. Kelly Ramírez is a 73 y.o. 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 On September 12 2022, patient presented to the ED again for rectal bleeding. Labs were significant for chloride 94, bicarbonate 30, creatinine 2.0. Renal function improved with IVF administration, creatinine went down to 0.9 mg/dL on September 14. Since then his creatinine level has progressively increased up to 1.9 mg/dL, reason for this consultation.   Review of her records showed that on September 14 she was started on furosemide and the following day on September 15 she had a CT abdomen and pelvis with IV contrast.      Past Medical History:        Diagnosis Date    A-fib [facility])     follows with Dr Park Serrato to transfer from chair to wheelchair     with assistance    Abnormal mammogram 2010    Acute on chronic respiratory failure ([address] 05/05/2017    Anemia     Anemia due to chronic illness     Ankle fracture, left 2008    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[facility] 03/26/2007    Backache     Benign hypertension     CHF (congestive heart failure) (HCC)     Chronic back pain     Chronic kidney disease     stage 3    Chronic pain disorder     Chronic, continuous use of opioids     Compression fracture of thoracic vertebra (HCC)     T10    COPD (chronic obstructive pulmonary disease) ([facility]     Debility     Deformity of ankle and foot, acquired 01/31/2011    Depression     Diabetes insipidus ([address]     Diverticulitis     Dry eyes     Encephalopathy acute 05/05/2017    Gait disturbance     GERD (gastroesophageal reflux disease)     Hemorrhoids 01/13/2012    Hernia     History of blood transfusion approx 05/2017    History of Clostridium difficile     negative culture 12-15-15; resolved    Hyperlipidemia     Hypothyroidism     Ischemic colitis, enteritis, or enterocolitis ([address]     Long term (current) use of systemic steroids 7/10/2022    Long-term current use of steroids     Lumbar disc herniation     Myalgia and myositis, unspecified     Nephrosclerosis     Nonunion of fracture 02/22/2011    Osteoarthritis     severe    PAF (paroxysmal atrial fibrillation) ([facility])     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HCC)     PCWP mean:26.0 PA mean: 24.00; denies any recent issues    Pulmonary hypertension (HCC)     ventricular diastolic function consistent with abnormal relaxation (stage I)    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, f/u w/ PCP    Rectal bleeding     Rhabdomyolysis 05/09/2011    Steroid-induced avascular necrosis of shoulder ([facility] [address]     Right    Tibia fracture 07/2010    Ventral hernia without obstruction or gangrene 7/10/2022     Past Surgical History:        Procedure Laterality Date    ABDOMEN SURGERY  2008    ischemic colitis    COLONOSCOPY  2008    healed colitis    COLONOSCOPY  04/11/2014    COLONOSCOPY  11/23/2015    severe colitis    COLONOSCOPY  10/24/2016    COLONOSCOPY  07/26/2017    ECHO COMPL W DOP COLOR FLOW  8/16/2015         ECHO COMPLETE  4/22/2013         FRACTURE SURGERY  2010    Tibial right    HEMORRHOID SURGERY  12/08/2016    KYPHOSIS SURGERY      For comp.  fx T10    LUMBAR DISC SURGERY      OTHER SURGICAL HISTORY  11/1/11    removal r leg external fixator    OTHER SURGICAL HISTORY  12/14/2021    Partial Vaginectomy, Endometrial Biopsy    SIGMOIDOSCOPY N/A 3/19/2021    SIGMOIDOSCOPY HEMORRHOID BANDING performed by Jerry Hoang MD at [address] [facility] / [address]      application TSF  3/6/75    UPPER GASTROINTESTINAL ENDOSCOPY  1/4/2016    UPPER GASTROINTESTINAL ENDOSCOPY  07/26/2017     Current Medications:    Current Facility-Administered Medications: hydrocortisone (ANUSOL-HC) 2.5 % rectal cream, , Rectal, BID  [Held by provider] furosemide (LASIX) injection 40 mg, 40 mg, IntraVENous, BID  guaiFENesin tablet 400 mg, 400 mg, Oral, TID  perflutren lipid microspheres (DEFINITY) injection 1.65 mg, 1.5 mL, IntraVENous, ONCE PRN  white petrolatum ointment, , Topical, BID PRN  bismuth subsalicylate (PEPTO BISMOL) 262 MG/15ML suspension 30 mL, 30 mL, Oral, Q4H PRN  potassium chloride (KLOR-CON M) extended release tablet 40 mEq, 40 mEq, Oral, PRN **OR** potassium bicarb-citric acid (EFFER-K) effervescent tablet 40 mEq, 40 mEq, Oral, PRN **OR** potassium chloride 10 mEq/100 mL IVPB (Peripheral Line), 10 mEq, IntraVENous, PRN  glucose chewable tablet 16 g, 4 tablet, Oral, PRN  dextrose bolus 10% 125 mL, 125 mL, IntraVENous, PRN **OR** dextrose bolus 10% 250 mL, 250 mL, IntraVENous, PRN  glucagon (rDNA) injection 1 mg, 1 mg, SubCUTAneous, PRN  dextrose 10 % infusion, , IntraVENous, Continuous PRN  promethazine-codeine (PHENERGAN with CODEINE) 6.25-10 MG/5ML solution 5 mL, 5 mL, Oral, Q4H PRN  loperamide (IMODIUM) capsule 2 mg, 2 mg, Oral, 4x Daily PRN  iopamidol (ISOVUE-370) 76 % injection 75 mL, 75 mL, IntraVENous, ONCE PRN  oxyCODONE-acetaminophen (PERCOCET) 5-325 MG per tablet 2 tablet, 2 tablet, Oral, Q4H PRN  albuterol (PROVENTIL) nebulizer solution 2.5 mg, 2.5 mg, Nebulization, Q6H PRN  desmopressin (DDAVP) tablet 200 mcg, 200 mcg, Oral, BID  dilTIAZem (CARDIZEM) tablet 30 mg, 30 mg, Oral, TID  escitalopram (LEXAPRO) tablet 10 mg, 10 mg, Oral, BID  famotidine (PEPCID) tablet 20 mg, 20 mg, Oral, Daily  ferrous sulfate (IRON 325) tablet 325 mg, 325 mg, Oral, BID WC  levothyroxine (SYNTHROID) tablet 75 mcg, 75 mcg, Oral, QAM AC  metoprolol succinate (TOPROL XL) extended release tablet 100 mg, 100 mg, Oral, Daily  predniSONE (DELTASONE) tablet 5 mg, 5 mg, Oral, Daily  budesonide (PULMICORT) nebulizer suspension 250 mcg, 0.25 mg, Nebulization, BID **AND** Arformoterol Tartrate (BROVANA) nebulizer solution 15 mcg, 15 mcg, Nebulization, BID  enoxaparin (LOVENOX) injection 40 mg, 40 mg, SubCUTAneous, Daily  sodium chloride flush 0.9 % injection 10 mL, 10 mL, IntraVENous, 2 times per day  sodium chloride flush 0.9 % injection 10 mL, 10 mL, IntraVENous, PRN  0.9 % sodium chloride infusion, , IntraVENous, PRN  [DISCONTINUED] promethazine (PHENERGAN) tablet 12.5 mg, 12.5 mg, Oral, Q6H PRN **OR** ondansetron (ZOFRAN) injection 4 mg, 4 mg, IntraVENous, Q6H PRN  polyethylene glycol (GLYCOLAX) packet 17 g, 17 g, Oral, Daily PRN  acetaminophen (TYLENOL) tablet 650 mg, 650 mg, Oral, Q6H PRN **OR** acetaminophen (TYLENOL) suppository 650 mg, 650 mg, Rectal, Q6H PRN  Allergies:  Patient has no known allergies. Social History:    TOBACCO:   reports that she quit smoking about 26 years ago. Her smoking use included cigarettes. She started smoking about 51 years ago. She has a 18.75 pack-year smoking history. She has never used smokeless tobacco.  ETOH:   reports no history of alcohol use. DRUGS:   reports that she does not currently use drugs.     Family History:       Problem Relation Age of Onset    Diabetes Mother     Arthritis Mother     High Blood Pressure Mother     Arthritis Father     High Blood Pressure Father     Diabetes Father     High Blood Pressure Sister     Alcohol Abuse Sister     Substance Abuse Brother     Substance Abuse Sister     Coronary Art Dis Neg Hx     Breast Cancer Neg Hx     Ovarian Cancer Neg Hx      REVIEW OF SYSTEMS:    CONSTITUTIONAL:  negative for  fevers, chills, sweats, and fatigue  EYES: negative for  blurred vision, visual changes  HEENT:  negative for  hearing loss, tinnitus, ear drainage, and earaches  RESPIRATORY:  positive for  dyspnea  CARDIOVASCULAR:  negative for  chest pain, edema  GASTROINTESTINAL:  positive for diarrhea and abdominal pain  GENITOURINARY:  negative for frequency, dysuria, and nocturia  HEMATOLOGIC/LYMPHATIC:  negative for easy bruising, bleeding, and petechiae  NEUROLOGICAL:  negative for headaches, dizziness, seizures, and memory problems    PHYSICAL EXAM:      Vitals:    VITALS:  BP (!) 125/95   Pulse 65   Temp 97 °F (36.1 °C) (Temporal)   Resp 18   Ht 5' (1.524 m)   Wt 181 lb 9.6 oz (82.4 kg)   LMP  (LMP Unknown)   SpO2 96%   BMI 35.47 kg/m²   24HR INTAKE/OUTPUT:    Intake/Output Summary (Last 24 hours) at 9/19/2022 1414  Last data filed at 9/19/2022 0937  Gross per 24 hour   Intake 840 ml   Output 400 ml   Net 440 ml       Constitutional: Drowsy, follows commands, responds to voice  HEENT:  PERRLA  Respiratory:  CTA  Cardiovascular/Edema:  Normal S1/S2, RRR, no murmur, no edema  Gastrointestinal:  Soft, non-distended  Neurologic:  non-focal  Skin:  No rashes, lesions       DATA:    CBC with Differential:    Lab Results   Component Value Date/Time    WBC 4.7 09/18/2022 11:19 AM    RBC 3.84 09/18/2022 11:19 AM    RBC 3.57 12/14/2021 09:57 AM    HGB 9.9 09/18/2022 11:19 AM    HCT 33.2 09/18/2022 11:19 AM     09/18/2022 11:19 AM    MCV 86.5 09/18/2022 11:19 AM    MCH 25.8 09/18/2022 11:19 AM    MCHC 29.8 09/18/2022 11:19 AM    RDW 15.5 09/18/2022 11:19 AM    NRBC 0.0 09/10/2022 01:23 PM    SEGSPCT 77 01/15/2014 04:00 PM    BANDSPCT 1 06/25/2014 05:45 AM    METASPCT 0.9 09/15/2022 11:32 AM    LYMPHOPCT 15.8 09/18/2022 11:19 AM    LYMPHOPCT 14.5 12/14/2021 09:57 AM    PROMYELOPCT 0.9 09/10/2022 01:23 PM    MONOPCT 16.0 09/18/2022 11:19 AM    MYELOPCT 0.9 03/01/2021 04:39 PM    BASOPCT 0.6 09/18/2022 11:19 AM    MONOSABS 0.75 09/18/2022 11:19 AM    LYMPHSABS 0.74 09/18/2022 11:19 AM    EOSABS 0.28 09/18/2022 11:19 AM    BASOSABS 0.03 09/18/2022 11:19 AM     CMP:    Lab Results   Component Value Date/Time     09/19/2022 04:53 AM    K 4.1 09/19/2022 04:53 AM    CL 97 09/19/2022 04:53 AM    CO2 28 09/19/2022 04:53 AM    BUN 19 09/19/2022 04:53 AM    CREATININE 1.9 09/19/2022 04:53 AM    GFRAA 32 09/19/2022 04:53 AM    LABGLOM 32 09/19/2022 04:53 AM    GLUCOSE 95 09/19/2022 04:53 AM    GLUCOSE 116 05/02/2012 07:40 PM    PROT 5.4 09/13/2022 06:05 AM    LABALBU 2.9 09/13/2022 06:05 AM    LABALBU 3.6 05/02/2012 07:40 PM    CALCIUM 8.7 09/19/2022 04:53 AM    BILITOT 0.3 09/13/2022 06:05 AM    ALKPHOS 61 09/13/2022 06:05 AM    AST 17 09/13/2022 06:05 AM    ALT 8 09/13/2022 06:05 AM     Magnesium:    Lab Results   Component Value Date/Time    MG 2.4 09/16/2022 05:08 AM     Phosphorus:    Lab Results   Component Value Date/Time    PHOS 3.4 03/01/2021 04:39 PM     Radiology Review:        CXR 9/18/22   Stable heart size with mild pulmonary vascular congestive changes. CT Abd/Pelvis 9/15/22   1. Gas fluid levels within the colon may reflect a diarrheal process. 2.  Postop changes in the abdomen as described. No evidence of bowel   obstruction. No free air or free fluid. 3.  Stable lung base opacities and chronic interstitial lung disease. IMPRESSION/RECOMMENDATIONS:      Mrs. Edgar Ross is a 73 y.o. 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 On September 12 2022, patient presented to the ED again for rectal bleeding.  Labs were significant for chloride 94, bicarbonate 30, creatinine 2.0. Renal function improved with IVF administration, creatinine went down to 0.9 mg/dL on September 14. Since then his creatinine level has progressively increased up to 1.9 mg/dL, reason for this consultation. Review of her records showed that on September 14 she was started on furosemide and the following day on September 15 she had a CT abdomen and pelvis with IV contrast.    IRMA stage II, probably volume responsive prerenal IRMA versus ATN 2/2 contrast induced IRMA in the setting of diuretic administration. Patient received IV contrast on 9/10/22 with recovery of renal function and received contrast again on 9/15/22. Central DI 2/2 Sarcoidosis, on DDAVP. Presently with mild hyponatremia. Secondary adrenal insufficiency, 2/2 sarcoidosis induced hypopituitarism, on prednisone   HTN, on metoprolol  HFpEF, 60%, on metoprolol, Echo pending, Lasix on hold. Pro-BNP 13,419 >8,729.  ---------------------------------------------------------------  Pulmonary HTN  UTI, s/p ceftriaxone   PAF, on diltiazem and metoprolol  Acute on Chronic Respiratory Failure 2/2 Sarcoidosis, on intermittent NIPPV   Hypothyroidism, probably secondary    Plan:    Start IV fluids 60 cc/hour   Decrease DDAVP to 200 mcg nightly  Place ramirez catheter, Strict I&O  Obtain BMP now and QD  Obtain urine electrolytes, urine urea nitrogen, UPCR, UACR, urine osmolality   Continue prednisone 5 mg daily  Continue to hold furosemide       Thank you Dr. Janeen Chavez, for allowing us to participate in the care of Mrs. Akbar Curry.

## 2022-09-19 NOTE — PROGRESS NOTES
Date: 9/18/2022    Time: 11:10 PM    Patient Placed On BIPAP/CPAP/ Non-Invasive Ventilation? Yes    If no must comment. Facial area red/color change? No           If YES are Blister/Lesion present? No   If yes must notify nursing staff  BIPAP/CPAP skin barrier?   Yes    Skin barrier type:mepilexlite       Comments:        Yaneli Helm RCP

## 2022-09-19 NOTE — CARE COORDINATION
Social Work/Case Management Transition of Care Planning (Emelyn Hyatt [place] [phone]):   Per attending, IRMA has worsened and a new nephrology consult will be made. Cr is now 1.9. Patient remains on 4L oxygen. She has home oxygen set up in place. Plan remains to discharge to home with [facility].  to transport home.     JIGAR Easton  9/19/2022

## 2022-09-19 NOTE — PROGRESS NOTES
Hospitalist Progress Note      PCP: Bebeto Espinosa MD    Date of Admission: 9/12/2022    Chief Complaint: Bright red blood per rectum, UTI, IRMA    Hospital Course:   Patient presented to the emergency department with abdominal pain and bright red blood per rectum. ER physician noted bright red streak of blood on rectal exam.  Vital signs within normal limits and stable. Laboratory studies demonstrate BUN 20, creatinine 2.0, hemoglobin 10.1. Urinalysis possible urinary tract infection. Patient given a dose of ceftriaxone. Given IV fluids for acute renal failure. Medicine consulted for admission. Patient also complained of diarrhea which was monitored. Hemoglobin remained stable since admission. For the naomi bleeding secondary to hemorrhoids-started on steroid cream.  As patient continued to have diarrheal episodes, Recent echo from July 2022 showed have an ejection fraction of 60%-patient was started on IV fluids for [facility] for 12 hours when patient started having respiratory failure secondary to hypoxia and pulmonary edema on the chest x-ray. proBNP was elevated compared to admission, pulmonology was consulted overnight after the RRT and patient was increased on the dose of Lasix from 20 mg daily to 40 mg twice daily. IRMA worsened-creatinine slowly started creeping up to 1.9. Nephrology was consulted    Subjective:   Patient was seen at bedside at this morning and mentions her diarrhea has improved. Currently off the BIPAP on RA during my eval.  Chest x-ray results reviewed with her. Discussed regarding her IRMA, Improved with her mentation.     Medications:  Reviewed    Infusion Medications    dextrose      sodium chloride       Scheduled Medications    [Held by provider] furosemide  40 mg IntraVENous BID    guaiFENesin  400 mg Oral TID    desmopressin  200 mcg Oral BID    dilTIAZem  30 mg Oral TID    escitalopram  10 mg Oral BID    famotidine  20 mg Oral Daily    ferrous sulfate  325 mg Oral BID WC    levothyroxine  75 mcg Oral QAM AC    metoprolol succinate  100 mg Oral Daily    predniSONE  5 mg Oral Daily    budesonide  0.25 mg Nebulization BID    And    Arformoterol Tartrate  15 mcg Nebulization BID    enoxaparin  40 mg SubCUTAneous Daily    sodium chloride flush  10 mL IntraVENous 2 times per day     PRN Meds: perflutren lipid microspheres, white petrolatum, bismuth subsalicylate, potassium chloride **OR** potassium alternative oral replacement **OR** potassium chloride, glucose, dextrose bolus **OR** dextrose bolus, glucagon (rDNA), dextrose, promethazine-codeine, loperamide, iopamidol, oxyCODONE-acetaminophen, albuterol, sodium chloride flush, sodium chloride, [DISCONTINUED] promethazine **OR** ondansetron, polyethylene glycol, acetaminophen **OR** acetaminophen      Intake/Output Summary (Last 24 hours) at 9/19/2022 1025  Last data filed at 9/19/2022 0330  Gross per 24 hour   Intake 480 ml   Output 400 ml   Net 80 ml           Exam:    /89   Pulse 65   Temp 97 °F (36.1 °C) (Temporal)   Resp 18   Ht 5' (1.524 m)   Wt 181 lb 9.6 oz (82.4 kg)   LMP  (LMP Unknown)   SpO2 96%   BMI 35.47 kg/m²     General appearance: Obese, appears stated age and cooperative. HEENT: Pupils equal, round, and reactive to light. Conjunctivae/corneas clear. Neck: Supple, with full range of motion. No jugular venous distention. Trachea midline. Respiratory:  Normal respiratory effort. Clear to auscultation, bilaterally without Rales/Wheezes/Rhonchi. Cardiovascular: Regular rate and rhythm with normal S1/S2 without murmurs, rubs or gallops. Abdomen: Soft, non-tender, non-distended with normal bowel sounds. hernia noted with protruding mass on the left side of the abdomen, Mild tenderness to palpation-LLQ, hemorrhoids  Musculoskeletal: No clubbing, cyanosis or edema bilaterally. Full range of motion without deformity. Skin: Skin color, texture, turgor normal.  No rashes or lesions.   Left-sided abdominal mass/hernia with skin ulceration oblique-chronic nonhealing- does not look infected  Neurologic: No focal deficit, alert and oriented      Labs:   Recent Labs     09/18/22  1119   WBC 4.7   HGB 9.9*   HCT 33.2*        Recent Labs     09/17/22  0801 09/18/22  0433 09/19/22  0453    134 137   K 4.3 4.4 4.1   CL 97* 97* 97*   CO2 26 26 28   BUN 15 16 19   CREATININE 1.6* 1.8* 1.9*   CALCIUM 8.4* 8.6 8.7     No results for input(s): AST, ALT, BILIDIR, BILITOT, ALKPHOS in the last 72 hours. No results for input(s): INR in the last 72 hours. No results for input(s): Bharti Braun in the last 72 hours. Assessment/Plan:    Active Hospital Problems    Diagnosis Date Noted    UTI (urinary tract infection) [N39.0] 09/12/2022     Priority: Medium   -Urinary tract infection  -IRMA worsening 2/2 Diuresis  -Bright red blood per rectum  -History of atrial fibrillation  -COPD with chronic CO2 Retention  - oBESE- Class I   -HFpEF  -Moderate mitral regurgitation  -Severe pulmonary hypertension  -Moderate tricuspid regurgitation    PLAN:  - Urine culture-neg-<10,0000- -Ceftriaxone discontinued  - Monitor renal function avoid nephrotoxic medications  - diarrhea- improved  - Chest x-ray reviewed and shows bilateral pulmonary edema with pleural effusions  - Pulmonology consulted- agree with diuresis  -proBNP ordered and shows [place] at [address]--->8729  -Lasix has been increased from 20 mg to 40 mg twice daily  -Echocardiogram reviewed from July 2022-shows to have an ejection fraction of 60%. Limited ECHO will be ordered. - IRMA-creatinine at 1.9  possibly from the diarrhea and Lasix. Held Lasix at this time. Nephrology consulted  -Monitor hemoglobin levels-  Hemoglobin stable at 9.9  -Transfuse for hemoglobin less than 7.0  -Consider general surgery consult if significant drop in hemoglobin. Currently stable      DVT Prophylaxis: Lovenox  Diet: ADULT DIET; Regular;  Low Sodium (2 gm)  ADULT ORAL NUTRITION SUPPLEMENT; Breakfast, Dinner;  Other Oral Supplement; [id number] High Protein Jello  Code Status: Full Code      Dispo -pending clinical improvement- diarrhea,  monitoring hemoglobin, IRMA    Jonathan Meraz MD

## 2022-09-19 NOTE — PROGRESS NOTES
Noemí Chance M.D.,[facility]  Esteban Fung D.O., FBHARGAVOAZ, Chucky Aquino M.D. Hilaria Garcia M.D. Conchis Bello D.O. Daily Pulmonary Progress Note    Patient:  Bethanne Dubin 73 y.o. female MRN: [id number]     Date of Service: 9/19/2022      Synopsis     We are following patient for respiratory failure, sarcoidosis     \"CC\"  rectal bleeding    Code status: FULL       Subjective      Patient was seen and examined. Lying in bed on 4 L nasal cannula no acute distress. Not tolerating PAP therapy overnight due to difficulty with full face mask. Discussed with RT trying nasal prongs. she is hypersomnolent. Abgs with hypercapnia. Will arrange NIV for dc. Ct chest July 2022 with enlarged Pulmonary artery, compared to 2020 imaging not much change. Review of Systems:  Constitutional: Denies fever, weight loss, night sweats, and fatigue  Skin: Denies pigmentation, dark lesions, and rashes   HEENT: Denies hearing loss, tinnitus, ear drainage, epistaxis, sore throat, and hoarseness. Cardiovascular: Denies palpitations, chest pain, and chest pressure. Respiratory: Denies cough, dyspnea at rest, hemoptysis, apnea, and choking.   Gastrointestinal: Denies nausea, vomiting, poor appetite, diarrhea, heartburn or reflux  Genitourinary: Denies dysuria, frequency, urgency or hematuria  Musculoskeletal: Denies myalgias, muscle weakness, and bone pain  Neurological: Denies dizziness, vertigo, headache, and focal weakness  Psychological: Denies anxiety and depression  Endocrine: Denies heat intolerance and cold intolerance  Hematopoietic/Lymphatic: Denies bleeding problems and blood transfusions    24-hour events:  None     Objective   Vitals: BP (!) 125/95   Pulse 65   Temp 97 °F (36.1 °C) (Temporal)   Resp 18   Ht 5' (1.524 m)   Wt 181 lb 9.6 oz (82.4 kg)   LMP  (LMP Unknown)   SpO2 96%   BMI 35.47 kg/m²     I/O:    Intake/Output Summary (Last 24 hours) at 9/19/2022 1242  Last data filed at 9/19/2022 0937  Gross per 24 hour   Intake 840 ml   Output 400 ml   Net 440 ml                  CPAP/EPAP: 8 cmH2O     CURRENT MEDS :  Scheduled Meds:   hydrocortisone   Rectal BID    [Held by provider] furosemide  40 mg IntraVENous BID    guaiFENesin  400 mg Oral TID    desmopressin  200 mcg Oral BID    dilTIAZem  30 mg Oral TID    escitalopram  10 mg Oral BID    famotidine  20 mg Oral Daily    ferrous sulfate  325 mg Oral BID WC    levothyroxine  75 mcg Oral QAM AC    metoprolol succinate  100 mg Oral Daily    predniSONE  5 mg Oral Daily    budesonide  0.25 mg Nebulization BID    And    Arformoterol Tartrate  15 mcg Nebulization BID    enoxaparin  40 mg SubCUTAneous Daily    sodium chloride flush  10 mL IntraVENous 2 times per day       Physical Exam:  General Appearance: appears comfortable in no acute distress. HEENT: Normocephalic atraumatic without obvious abnormality   Neck: Lips, mucosa, and tongue normal.  Supple, symmetrical, trachea midline, no adenopathy;thyroid:  no enlargement/tenderness/nodules or JVD. Lung: Breath sounds diminished, basilar crackles. Respirations   unlabored. Symmetrical expansion. Heart: RRR, normal S1, S2. No MRG  Abdomen: Soft, NT, ND. BS present x 4 quadrants. No bruit or organomegaly. Extremities: Pedal pulses 2+ symmetric b/l. Extremities normal, no cyanosis, clubbing, or edema. Musculokeletal: No joint swelling, no muscle tenderness. ROM normal in all joints of extremities. Neurologic: Mental status: Alert and Oriented X3 . Pertinent/ New Labs and Imaging Studies     Imaging Personally Reviewed:  CXR 9/18  No pneumothorax. .  The cardiomediastinal silhouette is without acute process. The osseous structures are without acute process. Stable heart size with   mild pulmonary vascular congestive changes. July 2022 CTA chest    CHEST:       No central pulmonary embolism; the distal pulmonary arteries are not well   evaluated due to breathing motion artifact. Enlarged pulmonary trunk and   pulmonary arteries are again seen, suggestive of pulmonary arterial   hypertension. Right heart chambers are also enlarged in keeping with the   same. No thoracic aortic aneurysm or dissection. New trace pleural effusions. Mild bilateral atelectasis. Other incidental findings as above. ABDOMEN/PELVIS:       Rectal wall thickening again seen, suggestive of a nonspecific proctitis. Consider further evaluation with colonoscopy. Other incidental findings as above. Echo 7/16/22   Ejection fraction is visually estimated at 60%. No regional wall motion abnormalities seen. Moderate left ventricular concentric hypertrophy noted. Dilated right ventricle with reduced function. Left atrial volume index of 34 ml per meters squared BSA. Moderate mitral regurgitation is present. Moderate tricuspid regurgitation. Pulmonary hypertension is severe. RVSP is 70 mmHg.    No evidence for hemodynamically significant pericardial effusion      Labs:  Lab Results   Component Value Date/Time    WBC 4.7 09/18/2022 11:19 AM    HGB 9.9 09/18/2022 11:19 AM    HCT 33.2 09/18/2022 11:19 AM    MCV 86.5 09/18/2022 11:19 AM    MCH 25.8 09/18/2022 11:19 AM    MCHC 29.8 09/18/2022 11:19 AM    RDW 15.5 09/18/2022 11:19 AM     09/18/2022 11:19 AM    MPV 10.5 09/18/2022 11:19 AM     Lab Results   Component Value Date/Time     09/19/2022 04:53 AM    K 4.1 09/19/2022 04:53 AM    CL 97 09/19/2022 04:53 AM    CO2 28 09/19/2022 04:53 AM    BUN 19 09/19/2022 04:53 AM    CREATININE 1.9 09/19/2022 04:53 AM    LABALBU 2.9 09/13/2022 06:05 AM    LABALBU 3.6 05/02/2012 07:40 PM    CALCIUM 8.7 09/19/2022 04:53 AM    GFRAA 32 09/19/2022 04:53 AM    LABGLOM 32 09/19/2022 04:53 AM     Lab Results   Component Value Date/Time    PROTIME 11.0 03/11/2022 11:29 AM    PROTIME 12.7 05/02/2012 07:40 PM    INR 1.0 03/11/2022 11:29 AM     Recent Labs     09/19/22  0923   PROBNP 8,729*     No results for input(s): PROCAL in the last 72 hours. This SmartLink has not been configured with any valid records. Abg 9/16/22  7.22/72/330/29/0.6/99    Micro:  No results for input(s): CULTRESP in the last 72 hours. No results for input(s): LABGRAM in the last 72 hours. No results for input(s): LEGUR in the last 72 hours. No results for input(s): STREPNEUMAGU in the last 72 hours. No results for input(s): LP1UAG in the last 72 hours. Assessment:    Acute on chronic respiratory failure with hypoxia and hypercapnia  GI bleed, Anemia  Pulmonary sarcoidosis  Restrictive lung disease  Obesity hypoventilation syndrome  LINDSAY  Chronic home O2 dependence 4 liters NC  Severe pulmonary hypertension WHO group 2, 3-chronic  Stage III severe COPD by Gold classification  Acute on chronic congestive heart failure with preserved EF  Moderate MR, Moderate TR  Chronic dyspnea  Bard's disease  Diabetes mellitus II  Stage III chronic kidney disease  History of nicotine dependence  PAF  Obesity, BMI 35  Debility       Plan:   Wean oxygen as tolerated keep SpO2 between 88-92%, on 4L NC. Baseline 4 L at home  Monitor abgs, hypercapnia with respiratory acidosis on admission would benefit from non invasive ventilator at home if agreeable. Before ordering  home device we will ensure compliance during inpatient stay. DME company is [employer]  AVAPS at bedtime and as needed with daytime naps as mentation has improved. Wean FiO2 as tolerated  Bronchodilators: Brovana/Pulmicort twice daily, albuterol as needed  Continue diuresis: IV lasix  placed on hold per primary team. Monitor I+O . Follow renal function, lytes. Creat 1.9  Iv abx stopped, asymptomatic bacteriuria   Monitor Hgb  Chest imaging with improved pulmonary edema 9/18/22  Chronic prednisone 5 mg daily  GI/DVT prophylaxis   PT, OT evaluation    Would benefit from non invasive home vent. Pt with Restrictive ventilatory defect, with daytime hypersomnolence.

## 2022-09-20 ENCOUNTER — APPOINTMENT (OUTPATIENT)
Dept: GENERAL RADIOLOGY | Age: 67
DRG: 682 | End: 2022-09-20
Payer: MEDICARE

## 2022-09-20 LAB
AADO2: 118.5 MMHG
ANION GAP SERPL CALCULATED.3IONS-SCNC: 11 MMOL/L (ref 7–16)
ANION GAP SERPL CALCULATED.3IONS-SCNC: 13 MMOL/L (ref 7–16)
B.E.: 3.6 MMOL/L (ref -3–3)
BASOPHILS ABSOLUTE: 0.02 E9/L (ref 0–0.2)
BASOPHILS RELATIVE PERCENT: 0.4 % (ref 0–2)
BUN BLDV-MCNC: 22 MG/DL (ref 6–23)
BUN BLDV-MCNC: 22 MG/DL (ref 6–23)
CALCIUM SERPL-MCNC: 8.6 MG/DL (ref 8.6–10.2)
CALCIUM SERPL-MCNC: 8.8 MG/DL (ref 8.6–10.2)
CHLORIDE BLD-SCNC: 96 MMOL/L (ref 98–107)
CHLORIDE BLD-SCNC: 97 MMOL/L (ref 98–107)
CHLORIDE URINE RANDOM: 21 MMOL/L
CO2: 26 MMOL/L (ref 22–29)
CO2: 26 MMOL/L (ref 22–29)
COHB: 0.5 % (ref 0–1.5)
CREAT SERPL-MCNC: 1.5 MG/DL (ref 0.5–1)
CREAT SERPL-MCNC: 1.5 MG/DL (ref 0.5–1)
CREATININE URINE: 156 MG/DL (ref 29–226)
CREATININE URINE: 156 MG/DL (ref 29–226)
CREATININE URINE: 158 MG/DL (ref 29–226)
CRITICAL: ABNORMAL
DATE ANALYZED: ABNORMAL
DATE OF COLLECTION: ABNORMAL
EOSINOPHILS ABSOLUTE: 0.24 E9/L (ref 0.05–0.5)
EOSINOPHILS RELATIVE PERCENT: 5.2 % (ref 0–6)
FIO2: 50 %
GFR AFRICAN AMERICAN: 42
GFR AFRICAN AMERICAN: 42
GFR NON-AFRICAN AMERICAN: 42 ML/MIN/1.73
GFR NON-AFRICAN AMERICAN: 42 ML/MIN/1.73
GLUCOSE BLD-MCNC: 83 MG/DL (ref 74–99)
GLUCOSE BLD-MCNC: 85 MG/DL (ref 74–99)
HCO3: 31.5 MMOL/L (ref 22–26)
HCT VFR BLD CALC: 33.1 % (ref 34–48)
HEMOGLOBIN: 10.1 G/DL (ref 11.5–15.5)
HHB: 1.2 % (ref 0–5)
IMMATURE GRANULOCYTES #: 0.04 E9/L
IMMATURE GRANULOCYTES %: 0.9 % (ref 0–5)
LAB: ABNORMAL
LYMPHOCYTES ABSOLUTE: 0.78 E9/L (ref 1.5–4)
LYMPHOCYTES RELATIVE PERCENT: 16.8 % (ref 20–42)
Lab: ABNORMAL
MCH RBC QN AUTO: 26.4 PG (ref 26–35)
MCHC RBC AUTO-ENTMCNC: 30.5 % (ref 32–34.5)
MCV RBC AUTO: 86.6 FL (ref 80–99.9)
METHB: 0.5 % (ref 0–1.5)
MICROALBUMIN UR-MCNC: 27.3 MG/L
MICROALBUMIN/CREAT UR-RTO: 17.5 (ref 0–30)
MODE: ABNORMAL
MONOCYTES ABSOLUTE: 0.84 E9/L (ref 0.1–0.95)
MONOCYTES RELATIVE PERCENT: 18.1 % (ref 2–12)
NEUTROPHILS ABSOLUTE: 2.72 E9/L (ref 1.8–7.3)
NEUTROPHILS RELATIVE PERCENT: 58.6 % (ref 43–80)
O2 SATURATION: 98.7 % (ref 92–98.5)
O2HB: 97.8 % (ref 94–97)
OPERATOR ID: 89
OSMOLALITY URINE: 397 MOSM/KG (ref 300–900)
PATIENT TEMP: 37 C
PCO2: 65.8 MMHG (ref 35–45)
PDW BLD-RTO: 15.8 FL (ref 11.5–15)
PEEP/CPAP: 8 CMH2O
PFO2: 3.03 MMHG/%
PH BLOOD GAS: 7.3 (ref 7.35–7.45)
PLATELET # BLD: 250 E9/L (ref 130–450)
PMV BLD AUTO: 10 FL (ref 7–12)
PO2: 151.5 MMHG (ref 75–100)
POTASSIUM SERPL-SCNC: 4 MMOL/L (ref 3.5–5)
POTASSIUM SERPL-SCNC: 4.2 MMOL/L (ref 3.5–5)
POTASSIUM, UR: 27.3 MMOL/L
PROTEIN PROTEIN: 39 MG/DL (ref 0–12)
PROTEIN/CREAT RATIO: 0.2
PROTEIN/CREAT RATIO: 0.2 (ref 0–0.2)
RBC # BLD: 3.82 E12/L (ref 3.5–5.5)
REASON FOR REJECTION: NORMAL
REJECTED TEST: NORMAL
RI(T): 0.78
RR MECHANICAL: 20 B/MIN
SODIUM BLD-SCNC: 133 MMOL/L (ref 132–146)
SODIUM BLD-SCNC: 136 MMOL/L (ref 132–146)
SODIUM URINE: <20 MMOL/L
SOURCE, BLOOD GAS: ABNORMAL
THB: 11.1 G/DL (ref 11.5–16.5)
TIME ANALYZED: 1523
UREA NITROGEN, UR: 490 MG/DL (ref 800–1666)
VT MECHANICAL: 450 ML
WBC # BLD: 4.6 E9/L (ref 4.5–11.5)

## 2022-09-20 PROCEDURE — 71045 X-RAY EXAM CHEST 1 VIEW: CPT

## 2022-09-20 PROCEDURE — 94640 AIRWAY INHALATION TREATMENT: CPT

## 2022-09-20 PROCEDURE — 2580000003 HC RX 258: Performed by: FAMILY MEDICINE

## 2022-09-20 PROCEDURE — 6360000002 HC RX W HCPCS: Performed by: FAMILY MEDICINE

## 2022-09-20 PROCEDURE — 94660 CPAP INITIATION&MGMT: CPT

## 2022-09-20 PROCEDURE — 84133 ASSAY OF URINE POTASSIUM: CPT

## 2022-09-20 PROCEDURE — 82805 BLOOD GASES W/O2 SATURATION: CPT

## 2022-09-20 PROCEDURE — 84156 ASSAY OF PROTEIN URINE: CPT

## 2022-09-20 PROCEDURE — 82044 UR ALBUMIN SEMIQUANTITATIVE: CPT

## 2022-09-20 PROCEDURE — 6370000000 HC RX 637 (ALT 250 FOR IP)

## 2022-09-20 PROCEDURE — 82570 ASSAY OF URINE CREATININE: CPT

## 2022-09-20 PROCEDURE — 84540 ASSAY OF URINE/UREA-N: CPT

## 2022-09-20 PROCEDURE — 6370000000 HC RX 637 (ALT 250 FOR IP): Performed by: FAMILY MEDICINE

## 2022-09-20 PROCEDURE — 83935 ASSAY OF URINE OSMOLALITY: CPT

## 2022-09-20 PROCEDURE — 6370000000 HC RX 637 (ALT 250 FOR IP): Performed by: STUDENT IN AN ORGANIZED HEALTH CARE EDUCATION/TRAINING PROGRAM

## 2022-09-20 PROCEDURE — 6370000000 HC RX 637 (ALT 250 FOR IP): Performed by: INTERNAL MEDICINE

## 2022-09-20 PROCEDURE — 2140000000 HC CCU INTERMEDIATE R&B

## 2022-09-20 PROCEDURE — 84300 ASSAY OF URINE SODIUM: CPT

## 2022-09-20 PROCEDURE — 80048 BASIC METABOLIC PNL TOTAL CA: CPT

## 2022-09-20 PROCEDURE — 85025 COMPLETE CBC W/AUTO DIFF WBC: CPT

## 2022-09-20 PROCEDURE — 82436 ASSAY OF URINE CHLORIDE: CPT

## 2022-09-20 PROCEDURE — 2700000000 HC OXYGEN THERAPY PER DAY

## 2022-09-20 PROCEDURE — 36415 COLL VENOUS BLD VENIPUNCTURE: CPT

## 2022-09-20 PROCEDURE — 36600 WITHDRAWAL OF ARTERIAL BLOOD: CPT

## 2022-09-20 RX ADMIN — BUDESONIDE 250 MCG: 0.25 SUSPENSION RESPIRATORY (INHALATION) at 22:46

## 2022-09-20 RX ADMIN — ARFORMOTEROL TARTRATE 15 MCG: 15 SOLUTION RESPIRATORY (INHALATION) at 08:07

## 2022-09-20 RX ADMIN — GUAIFENESIN 400 MG: 400 TABLET ORAL at 14:30

## 2022-09-20 RX ADMIN — ENOXAPARIN SODIUM 40 MG: 100 INJECTION SUBCUTANEOUS at 08:22

## 2022-09-20 RX ADMIN — ESCITALOPRAM OXALATE 10 MG: 10 TABLET ORAL at 22:41

## 2022-09-20 RX ADMIN — OXYCODONE AND ACETAMINOPHEN 2 TABLET: 5; 325 TABLET ORAL at 22:47

## 2022-09-20 RX ADMIN — GUAIFENESIN 400 MG: 400 TABLET ORAL at 08:20

## 2022-09-20 RX ADMIN — FERROUS SULFATE TAB 325 MG (65 MG ELEMENTAL FE) 325 MG: 325 (65 FE) TAB at 08:20

## 2022-09-20 RX ADMIN — PREDNISONE 5 MG: 5 TABLET ORAL at 08:20

## 2022-09-20 RX ADMIN — ARFORMOTEROL TARTRATE 15 MCG: 15 SOLUTION RESPIRATORY (INHALATION) at 22:46

## 2022-09-20 RX ADMIN — DESMOPRESSIN ACETATE 200 MCG: 0.1 TABLET ORAL at 23:06

## 2022-09-20 RX ADMIN — Medication 5 ML: at 04:45

## 2022-09-20 RX ADMIN — GUAIFENESIN 400 MG: 400 TABLET ORAL at 22:41

## 2022-09-20 RX ADMIN — HYDROCORTISONE: 25 CREAM TOPICAL at 22:42

## 2022-09-20 RX ADMIN — ESCITALOPRAM OXALATE 10 MG: 10 TABLET ORAL at 08:20

## 2022-09-20 RX ADMIN — METOPROLOL SUCCINATE 100 MG: 100 TABLET, FILM COATED, EXTENDED RELEASE ORAL at 08:19

## 2022-09-20 RX ADMIN — FERROUS SULFATE TAB 325 MG (65 MG ELEMENTAL FE) 325 MG: 325 (65 FE) TAB at 18:18

## 2022-09-20 RX ADMIN — BUDESONIDE 250 MCG: 0.25 SUSPENSION RESPIRATORY (INHALATION) at 08:08

## 2022-09-20 RX ADMIN — OXYCODONE AND ACETAMINOPHEN 2 TABLET: 5; 325 TABLET ORAL at 05:02

## 2022-09-20 RX ADMIN — Medication 10 ML: at 22:42

## 2022-09-20 RX ADMIN — DILTIAZEM HYDROCHLORIDE 30 MG: 30 TABLET, FILM COATED ORAL at 22:41

## 2022-09-20 RX ADMIN — LEVOTHYROXINE SODIUM 75 MCG: 0.07 TABLET ORAL at 06:36

## 2022-09-20 RX ADMIN — DILTIAZEM HYDROCHLORIDE 30 MG: 30 TABLET, FILM COATED ORAL at 04:45

## 2022-09-20 RX ADMIN — FAMOTIDINE 20 MG: 20 TABLET ORAL at 08:20

## 2022-09-20 ASSESSMENT — PAIN SCALES - GENERAL
PAINLEVEL_OUTOF10: 7
PAINLEVEL_OUTOF10: 0
PAINLEVEL_OUTOF10: 10

## 2022-09-20 ASSESSMENT — PAIN DESCRIPTION - LOCATION
LOCATION: BACK
LOCATION: ABDOMEN

## 2022-09-20 NOTE — PROGRESS NOTES
Date: 9/19/2022    Time: 11:18 PM    Patient Placed On BIPAP/CPAP/ Non-Invasive Ventilation? Yes    If no must comment. Facial area red/color change? No           If YES are Blister/Lesion present? No   If yes must notify nursing staff  BIPAP/CPAP skin barrier? Yes    Skin barrier type:mepilexlite       Comments:  Attempted to place patient on nasal mask. Patient breathing out of mouth and not getting tidal volumes. Placed patient back on full face mask.        Leny Mitchell RCP

## 2022-09-20 NOTE — CARE COORDINATION
9/20 Update CM note. Pt is now alert to self only and has been placed on continuous BiPAP. Plan on discharge was to return home with [facility] for dressing changes to abdominal wound as pt was A&Ox4. CM LM with pt's  Johanna Martin regarding need for additional discharge planning. Await callback. [facility]  [facility]. PT/OT evals ordered by pulm yesterday, will be completed once pt's respiratory status improves. Sanchez cath ordered by nephro for strict I&O and NS @ 60 mL/hr started. Lasix IV on hold. Cr today, 1.5.    [id number]  Received call from pt's  Johanna Martin. Discussed CM role and that pt may require FRITZ on discharge d/t lack of activity and deconditioning.  He was in agreement and requested [facility] list. ILEANA Hernandez notified to deliver list as he is on his way to hospital.     Gaston Cushing, SHARONN, RN  [facility] SURG[facility] Case Management   Cell: [phone]

## 2022-09-20 NOTE — PROGRESS NOTES
Department of Internal Medicine  Nephrology Progress Note    Events reviewed. SUBJECTIVE:  We are following Mrs. Charlesetta Angelucci for IRMA. She is currently on BiPAP.      PHYSICAL EXAM:      Vitals:    VITALS:  BP (!) 161/111   Pulse 70   Temp 97.5 °F (36.4 °C) (Axillary)   Resp 21   Ht 5' (1.524 m)   Wt 192 lb 4.8 oz (87.2 kg)   LMP  (LMP Unknown)   SpO2 97%   BMI 37.56 kg/m²   24HR INTAKE/OUTPUT:    Intake/Output Summary (Last 24 hours) at 9/20/2022 1430  Last data filed at 9/20/2022 0932  Gross per 24 hour   Intake 514 ml   Output 450 ml   Net 64 ml         Constitutional: Drowsy, follows commands, responds to voice  HEENT:  PERRLA  Respiratory:  CTA  Cardiovascular/Edema:  Normal S1/S2, RRR, no murmur, no edema  Gastrointestinal:  Soft, non-distended  Neurologic:  non-focal  Skin:  No rashes, lesions     Scheduled Meds:   hydrocortisone   Rectal BID    desmopressin  200 mcg Oral Nightly    [Held by provider] furosemide  40 mg IntraVENous BID    guaiFENesin  400 mg Oral TID    dilTIAZem  30 mg Oral TID    escitalopram  10 mg Oral BID    famotidine  20 mg Oral Daily    ferrous sulfate  325 mg Oral BID WC    levothyroxine  75 mcg Oral QAM AC    metoprolol succinate  100 mg Oral Daily    predniSONE  5 mg Oral Daily    budesonide  0.25 mg Nebulization BID    And    Arformoterol Tartrate  15 mcg Nebulization BID    enoxaparin  40 mg SubCUTAneous Daily    sodium chloride flush  10 mL IntraVENous 2 times per day     Continuous Infusions:   sodium chloride Stopped (09/20/22 0859)    dextrose      sodium chloride       PRN Meds:.perflutren lipid microspheres, white petrolatum, bismuth subsalicylate, potassium chloride **OR** potassium alternative oral replacement **OR** potassium chloride, glucose, dextrose bolus **OR** dextrose bolus, glucagon (rDNA), dextrose, promethazine-codeine, loperamide, iopamidol, oxyCODONE-acetaminophen, albuterol, sodium chloride flush, sodium chloride, [DISCONTINUED] promethazine **OR** ondansetron, polyethylene glycol, acetaminophen **OR** acetaminophen    DATA:    CBC with Differential:    Lab Results   Component Value Date/Time    WBC 4.6 09/20/2022 06:28 AM    RBC 3.82 09/20/2022 06:28 AM    RBC 3.57 12/14/2021 09:57 AM    HGB 10.1 09/20/2022 06:28 AM    HCT 33.1 09/20/2022 06:28 AM     09/20/2022 06:28 AM    MCV 86.6 09/20/2022 06:28 AM    MCH 26.4 09/20/2022 06:28 AM    MCHC 30.5 09/20/2022 06:28 AM    RDW 15.8 09/20/2022 06:28 AM    NRBC 0.0 09/10/2022 01:23 PM    SEGSPCT 77 01/15/2014 04:00 PM    BANDSPCT 1 06/25/2014 05:45 AM    METASPCT 0.9 09/15/2022 11:32 AM    LYMPHOPCT 16.8 09/20/2022 06:28 AM    LYMPHOPCT 14.5 12/14/2021 09:57 AM    PROMYELOPCT 0.9 09/10/2022 01:23 PM    MONOPCT 18.1 09/20/2022 06:28 AM    MYELOPCT 0.9 03/01/2021 04:39 PM    BASOPCT 0.4 09/20/2022 06:28 AM    MONOSABS 0.84 09/20/2022 06:28 AM    LYMPHSABS 0.78 09/20/2022 06:28 AM    EOSABS 0.24 09/20/2022 06:28 AM    BASOSABS 0.02 09/20/2022 06:28 AM     CMP:    Lab Results   Component Value Date/Time     09/20/2022 04:37 AM    K 4.2 09/20/2022 04:37 AM    K 4.1 09/19/2022 04:53 AM    CL 97 09/20/2022 04:37 AM    CO2 26 09/20/2022 04:37 AM    BUN 22 09/20/2022 04:37 AM    CREATININE 1.5 09/20/2022 04:37 AM    GFRAA 42 09/20/2022 04:37 AM    LABGLOM 42 09/20/2022 04:37 AM    GLUCOSE 83 09/20/2022 04:37 AM    GLUCOSE 116 05/02/2012 07:40 PM    PROT 5.4 09/13/2022 06:05 AM    LABALBU 2.9 09/13/2022 06:05 AM    LABALBU 3.6 05/02/2012 07:40 PM    CALCIUM 8.6 09/20/2022 04:37 AM    BILITOT 0.3 09/13/2022 06:05 AM    ALKPHOS 61 09/13/2022 06:05 AM    AST 17 09/13/2022 06:05 AM    ALT 8 09/13/2022 06:05 AM     Magnesium:    Lab Results   Component Value Date/Time    MG 2.4 09/16/2022 05:08 AM     Phosphorus:    Lab Results   Component Value Date/Time    PHOS 3.4 03/01/2021 04:39 PM     Radiology Review:        CXR 9/18/22   Stable heart size with mild pulmonary vascular congestive changes.      CT Abd/Pelvis 9/15/22   1. Gas fluid levels within the colon may reflect a diarrheal process. 2.  Postop changes in the abdomen as described. No evidence of bowel   obstruction. No free air or free fluid. 3.  Stable lung base opacities and chronic interstitial lung disease. BRIEF SUMMARY OF INITIAL CONSULT:    Mrs. Maria Alejandra Donald is a 73 y.o. 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 On September 12 2022, patient presented to the ED again for rectal bleeding. Labs were significant for chloride 94, bicarbonate 30, creatinine 2.0. Renal function improved with IVF administration, creatinine went down to 0.9 mg/dL on September 14. Since then his creatinine level has progressively increased up to 1.9 mg/dL, reason for this consultation. Review of her records showed that on September 14 she was started on furosemide and the following day on September 15 she had a CT abdomen and pelvis with IV contrast.    IMPRESSION/RECOMMENDATIONS:      IRMA stage II, likely volume responsive prerenal IRMA versus ATN 2/2 contrast induced IRMA in the setting of diuretic administration. Patient received IV contrast on 9/10/22 with recovery of renal function and received contrast again on 9/15/22. Renal function improved with IV fluid administration, creatinine down to 1.5. Central DI 2/2 sarcoidosis, on DDAVP. Presently with mild hyponatremia.   To decrease dosing to once daily at bedtime  Secondary adrenal insufficiency, 2/2 sarcoidosis induced hypopituitarism, on prednisone   Acidemia (pH 7.229), with respiratory acidosis and metabolic acidosis secondary to diarrhea  HTN, on metoprolol  HFpEF, 60%, on metoprolol, Echo pending, Lasix on hold.  Pro-BNP 13,419 >>8,729  ---------------------------------------------------------------  Pulmonary HTN  UTI, s/p ceftriaxone   PAF, on diltiazem and metoprolol  Acute on chronic respiratory failure 2/2 sarcoidosis, on intermittent NIPPV   Hypothyroidism, probably secondary    Plan:    Continue NS 60 cc/hour   Continue prednisone 5 mg PO daily   Continue to hold Lasix for now  Continue DDAVP 200 mcg nightly  Strict I&Os  Continue to monitor renal function, BMP 4 PM  Repeat chest x-ray  Obtain ABGs    Electronically signed by FRIDA Cyr CNP on 9/20/2022 at 2:38 PM

## 2022-09-20 NOTE — PROGRESS NOTES
Hospitalist progress note    Patient:  Rosie Bryant  YOB: 1955  Date of Service: 9/20/22  MRN: [id number]   Acct:  [de-identified]   Primary Care Physician: Sonal Ramirez MD  9/12/2022     Patient Seen, Chart, Consults notes, Labs, Radiology, family and social history were reviewed. Assessment and Plan:    Rectal bleeding improved  Respiratory failure, continue BiPAP machine  Continue DVT &  GERD prophylaxis    Acute kidney injury, improving, creatinine today 1.5  UTI, Rocephin was discontinued after negative blood culture  Diarrhea improving patient was taking IV fluids because of acute kidney injury  Elevated BNP continue Lasix  Echo shows ejection fraction of 60%  Anemia : monitor hemoglobin, transfuse as needed hemoglobin today is 10.1  Pending chest x-ray today            Chief Complaint: Patient is confused        Subjective     Patient is confused today but she did not wear the BiPAP all night long she keeps refusing it I talked to the patient regarding BiPAP and how important so she agreed to wear the BiPAP again. There is no fever, or chills or sweating.   The patient denied any chest pain, shortness of breath , palpitations, or irregular heart beats           Review of systems:    All 10 review of system were negative unless what is mentioned in the present history               PHYSICAL EXAM:  /82   Pulse 76   Temp 98.2 °F (36.8 °C) (Temporal)   Resp 20   Ht 5' (1.524 m)   Wt 192 lb 4.8 oz (87.2 kg)   LMP  (LMP Unknown)   SpO2 97%   BMI 37.56 kg/m²     General appearance -too sleepy possibly the patient is carbon dioxide retainer because she refused BiPAP  Head: atrauma   Pupil: equal, round reactive to light   Neck: Supple, trachea is midline   Chest -occasionally wheezes  Distant Breath Sounds: Yes  Heart - S1 and S2 normal   Abdomen - soft, nontender,   Integumentary -pale  Musculoskeletal - no joint tenderness, deformity or swelling   Extremities - peripheral pulses normal, no pedal edema, no clubbing or cyanosis times 4         Review of Labs and Diagnostic Testing:         XR CHEST (2 VW)    Result Date: 9/16/2022  EXAMINATION: TWO XRAY VIEWS OF THE CHEST 9/16/2022 3:58 pm COMPARISON: None. HISTORY: ORDERING SYSTEM PROVIDED HISTORY: dyspnea TECHNOLOGIST PROVIDED HISTORY: Reason for exam:->dyspnea What reading provider will be dictating this exam?->CRC FINDINGS: AP and lateral views of the chest demonstrate bilateral pleural effusions with prominent pulmonary vascularity and moderate cardiomegaly. There are bilateral Kerley B lines with no evidence of a pneumothorax. Cardiomegaly with moderate interstitial pulmonary edema. CT ABDOMEN PELVIS W IV CONTRAST Additional Contrast? Radiologist Recommendation    Result Date: 9/15/2022  EXAMINATION: CT OF THE ABDOMEN AND PELVIS WITH CONTRAST 9/15/2022 2:14 pm TECHNIQUE: CT of the abdomen and pelvis was performed with the administration of intravenous contrast. Multiplanar reformatted images are provided for review. Automated exposure control, iterative reconstruction, and/or weight based adjustment of the mA/kV was utilized to reduce the radiation dose to as low as reasonably achievable. COMPARISON: None. HISTORY: ORDERING SYSTEM PROVIDED HISTORY: abdominal pain-LLQ, diarrhea TECHNOLOGIST PROVIDED HISTORY: Reason for exam:->abdominal pain-LLQ, diarrhea Additional Contrast?->Radiologist Recommendation What reading provider will be dictating this exam?->CRC FINDINGS: Lower Chest: Ground-glass opacities, interstitial opacities compatible with interstitial lung disease unchanged. Pulmonary artery is enlarged suggestive of pulmonary artery hypertension. Stable consolidation and blood formation in the lung bases anteriorly. Organs: Liver and spleen are normal in size without focal lesion. Stomach has a normal configuration. Pancreas appears normal.  Gallbladder is elongated but otherwise normal, no visible radiopaque calculi. wall hernia measuring approximately 9 cm in AP dimension with base of approximately 13 cm in axial dimension. Multiple segments of bowel and mesenteric fat are present within herniation sac. No bowel obstruction or strangulation. Multiple diverticula involving the left colon. Postsurgical changes related to bowel resection present in right lower abdomen. The appendix is not definitively visualized, however no focal inflammatory changes in its expected location. No intrahepatic or extrahepatic bile duct dilatation. Multiple tiny gallstones layer in the gallbladder. No evidence of acute pancreatitis. Spleen is normal in size. Adrenal glands are normal.  No hydronephrosis or perinephric edema. No retroperitoneal lymphadenopathy. Urinary bladder is grossly unremarkable yet not optimally distended. Irregular opacities are present in the lung bases slightly decreased compared to prior. 1. Stable ventral abdominal wall hernia. 2. No bowel obstruction, free air, or free fluid. 3. Diverticulosis 4. Cholelithiasis 5. Redemonstration of opacities in lung bases suggestive of subsegmental atelectasis and chronic interstitial lung disease. XR CHEST PORTABLE    Result Date: 9/18/2022  EXAMINATION: ONE XRAY VIEW OF THE CHEST 9/18/2022 8:59 am COMPARISON: 09/16/2022 HISTORY: ORDERING SYSTEM PROVIDED HISTORY: pulmonary edema TECHNOLOGIST PROVIDED HISTORY: Reason for exam:->pulmonary edema What reading provider will be dictating this exam?->CRC FINDINGS: No pneumothorax. .  The cardiomediastinal silhouette is without acute process. The osseous structures are without acute process. Stable heart size with mild pulmonary vascular congestive changes. Stable heart size with mild pulmonary vascular congestive changes.      Recent Results (from the past 24 hour(s))   Brain Natriuretic Peptide    Collection Time: 09/19/22  9:23 AM   Result Value Ref Range    Pro-BNP 8,729 (H) 0 - 125 pg/mL   Basic Metabolic Panel Collection Time: 09/19/22  1:18 PM   Result Value Ref Range    Sodium 133 132 - 146 mmol/L    Potassium 3.7 3.5 - 5.0 mmol/L    Chloride 95 (L) 98 - 107 mmol/L    CO2 25 22 - 29 mmol/L    Anion Gap 13 7 - 16 mmol/L    Glucose 107 (H) 74 - 99 mg/dL    BUN 18 6 - 23 mg/dL    Creatinine 1.8 (H) 0.5 - 1.0 mg/dL    GFR Non-African American 34 >=60 mL/min/1.73    GFR African American 34     Calcium 8.9 8.6 - 10.2 mg/dL   OSMOLALITY, SERUM    Collection Time: 09/19/22  1:18 PM   Result Value Ref Range    Osmolality 282 (L) 285 - 310 mOsm/Kg   POCT Glucose    Collection Time: 09/19/22  7:33 PM   Result Value Ref Range    Meter Glucose 131 (H) 74 - 99 mg/dL   Protein / creatinine ratio, urine    Collection Time: 09/20/22 12:52 AM   Result Value Ref Range    Protein, Ur 39 (H) 0 - 12 mg/dL    Creatinine, Ur 158 29 - 226 mg/dL    Protein/Creat Ratio 0.2 0.0 - 0.2    Protein/Creat Ratio 0.2    UREA NITROGEN, URINE    Collection Time: 09/20/22 12:52 AM   Result Value Ref Range    Urea Nitrogen, Ur 490 (L) 800 - 1666 mg/dL   Urine electrolytes    Collection Time: 09/20/22 12:52 AM   Result Value Ref Range    Sodium, Ur <20 Not Established mmol/L    Potassium, Ur 27.3 Not Established mmol/L    Chloride 21 Not Established mmol/L   Osmolality, urine    Collection Time: 09/20/22 12:52 AM   Result Value Ref Range    Osmolality, Ur 397 300 - 900 mOsm/kg   Microalbumin / creatinine urine ratio    Collection Time: 09/20/22 12:52 AM   Result Value Ref Range    Microalbumin, Random Urine 27.3 (H) Not Established mg/L    Creatinine, Ur 156 29 - 226 mg/dL    Microalbumin Creatinine Ratio 17.5 0.0 - 30.0   Creatinine, urine, random    Collection Time: 09/20/22 12:52 AM   Result Value Ref Range    Creatinine, Ur 156 29 - 226 mg/dL   Basic Metabolic Panel    Collection Time: 09/20/22  4:37 AM   Result Value Ref Range    Sodium 136 132 - 146 mmol/L    Potassium 4.2 3.5 - 5.0 mmol/L    Chloride 97 (L) 98 - 107 mmol/L    CO2 26 22 - 29 mmol/L Anion Gap 13 7 - 16 mmol/L    Glucose 83 74 - 99 mg/dL    BUN 22 6 - 23 mg/dL    Creatinine 1.5 (H) 0.5 - 1.0 mg/dL    GFR Non-African American 42 >=60 mL/min/1.73    GFR African American 42     Calcium 8.6 8.6 - 10.2 mg/dL   SPECIMEN REJECTION    Collection Time: 09/20/22  5:32 AM   Result Value Ref Range    Rejected Test CBCWD     Reason for Rejection see below    CBC with Auto Differential    Collection Time: 09/20/22  6:28 AM   Result Value Ref Range    WBC 4.6 4.5 - 11.5 E9/L    RBC 3.82 3.50 - 5.50 E12/L    Hemoglobin 10.1 (L) 11.5 - 15.5 g/dL    Hematocrit 33.1 (L) 34.0 - 48.0 %    MCV 86.6 80.0 - 99.9 fL    MCH 26.4 26.0 - 35.0 pg    MCHC 30.5 (L) 32.0 - 34.5 %    RDW 15.8 (H) 11.5 - 15.0 fL    Platelets 113 711 - 136 E9/L    MPV 10.0 7.0 - 12.0 fL    Neutrophils % 58.6 43.0 - 80.0 %    Immature Granulocytes % 0.9 0.0 - 5.0 %    Lymphocytes % 16.8 (L) 20.0 - 42.0 %    Monocytes % 18.1 (H) 2.0 - 12.0 %    Eosinophils % 5.2 0.0 - 6.0 %    Basophils % 0.4 0.0 - 2.0 %    Neutrophils Absolute 2.72 1.80 - 7.30 E9/L    Immature Granulocytes # 0.04 E9/L    Lymphocytes Absolute 0.78 (L) 1.50 - 4.00 E9/L    Monocytes Absolute 0.84 0.10 - 0.95 E9/L    Eosinophils Absolute 0.24 0.05 - 0.50 E9/L    Basophils Absolute 0.02 0.00 - 0.20 E9/L   ]     Current Facility-Administered Medications: hydrocortisone (ANUSOL-HC) 2.5 % rectal cream, , Rectal, BID  0.9 % sodium chloride infusion, , IntraVENous, Continuous  desmopressin (DDAVP) tablet 200 mcg, 200 mcg, Oral, Nightly  [Held by provider] furosemide (LASIX) injection 40 mg, 40 mg, IntraVENous, BID  guaiFENesin tablet 400 mg, 400 mg, Oral, TID  perflutren lipid microspheres (DEFINITY) injection 1.65 mg, 1.5 mL, IntraVENous, ONCE PRN  white petrolatum ointment, , Topical, BID PRN  bismuth subsalicylate (PEPTO BISMOL) 262 MG/15ML suspension 30 mL, 30 mL, Oral, Q4H PRN  potassium chloride (KLOR-CON M) extended release tablet 40 mEq, 40 mEq, Oral, PRN **OR** potassium bicarb-citric acid (EFFER-K) effervescent tablet 40 mEq, 40 mEq, Oral, PRN **OR** potassium chloride 10 mEq/100 mL IVPB (Peripheral Line), 10 mEq, IntraVENous, PRN  glucose chewable tablet 16 g, 4 tablet, Oral, PRN  dextrose bolus 10% 125 mL, 125 mL, IntraVENous, PRN **OR** dextrose bolus 10% 250 mL, 250 mL, IntraVENous, PRN  glucagon (rDNA) injection 1 mg, 1 mg, SubCUTAneous, PRN  dextrose 10 % infusion, , IntraVENous, Continuous PRN  promethazine-codeine (PHENERGAN with CODEINE) 6.25-10 MG/5ML solution 5 mL, 5 mL, Oral, Q4H PRN  loperamide (IMODIUM) capsule 2 mg, 2 mg, Oral, 4x Daily PRN  iopamidol (ISOVUE-370) 76 % injection 75 mL, 75 mL, IntraVENous, ONCE PRN  oxyCODONE-acetaminophen (PERCOCET) 5-325 MG per tablet 2 tablet, 2 tablet, Oral, Q4H PRN  albuterol (PROVENTIL) nebulizer solution 2.5 mg, 2.5 mg, Nebulization, Q6H PRN  dilTIAZem (CARDIZEM) tablet 30 mg, 30 mg, Oral, TID  escitalopram (LEXAPRO) tablet 10 mg, 10 mg, Oral, BID  famotidine (PEPCID) tablet 20 mg, 20 mg, Oral, Daily  ferrous sulfate (IRON 325) tablet 325 mg, 325 mg, Oral, BID WC  levothyroxine (SYNTHROID) tablet 75 mcg, 75 mcg, Oral, QAM AC  metoprolol succinate (TOPROL XL) extended release tablet 100 mg, 100 mg, Oral, Daily  predniSONE (DELTASONE) tablet 5 mg, 5 mg, Oral, Daily  budesonide (PULMICORT) nebulizer suspension 250 mcg, 0.25 mg, Nebulization, BID **AND** Arformoterol Tartrate (BROVANA) nebulizer solution 15 mcg, 15 mcg, Nebulization, BID  enoxaparin (LOVENOX) injection 40 mg, 40 mg, SubCUTAneous, Daily  sodium chloride flush 0.9 % injection 10 mL, 10 mL, IntraVENous, 2 times per day  sodium chloride flush 0.9 % injection 10 mL, 10 mL, IntraVENous, PRN  0.9 % sodium chloride infusion, , IntraVENous, PRN  [DISCONTINUED] promethazine (PHENERGAN) tablet 12.5 mg, 12.5 mg, Oral, Q6H PRN **OR** ondansetron (ZOFRAN) injection 4 mg, 4 mg, IntraVENous, Q6H PRN  polyethylene glycol (GLYCOLAX) packet 17 g, 17 g, Oral, Daily PRN  acetaminophen (TYLENOL) tablet 650 mg, 650 mg, Oral, Q6H PRN **OR** acetaminophen (TYLENOL) suppository 650 mg, 650 mg, Rectal, Q6H PRN   Hospital Problems             Last Modified POA    * (Principal) UTI (urinary tract infection) 9/12/2022 Yes                   Electronically signed by Marcelina Frankel, MD on 9/20/2022 at 7:45 AM

## 2022-09-20 NOTE — PROGRESS NOTES
Date: 9/20/2022    Time: 1:22 PM    Patient Placed On BIPAP/CPAP/ Non-Invasive Ventilation? Yes    If no must comment. Facial area red/color change? No           If YES are Blister/Lesion present? Yes   If yes must notify nursing staff  BIPAP/CPAP skin barrier?   Yes    Skin barrier type:mepilexlite       Comments:        Diana Boss RCP

## 2022-09-20 NOTE — PROGRESS NOTES
Doctor Anat Smith paged to bedside. Patient disoriented and muscle breathing. Patient agreed to put the bipap back on. Pulmonology made aware.  100% on bipap

## 2022-09-20 NOTE — PROGRESS NOTES
Isaias Valles M.D.,[facility]  Edil Spangler D.O., F.A.C.O.I., Delgado Decker M.D. Elizabeth Mendoza M.D. Diane Corey D.O. Daily Pulmonary Progress Note    Patient:  Jeffrey Hernandez 73 y.o. female MRN: [id number]     Date of Service: 9/20/2022      Synopsis     We are following patient for respiratory failure, sarcoidosis     \"CC\"  rectal bleeding    Code status: FULL       Subjective      Patient was seen and examined. Lying in bed on AVAPS 20/450/8/50% moaning wanting mask off. Per nurse at bedside, patient was transferred d/t being lethargic and AMS. Placed on AVAPS. Will keep it on with exceptions of meals     Review of Systems:  Limited d/t mentation    24-hour events:  Increased AMS    Objective   Vitals: BP (!) 161/111   Pulse 70   Temp 97.5 °F (36.4 °C) (Axillary)   Resp 21   Ht 5' (1.524 m)   Wt 192 lb 4.8 oz (87.2 kg)   LMP  (LMP Unknown)   SpO2 97%   BMI 37.56 kg/m²     I/O:    Intake/Output Summary (Last 24 hours) at 9/20/2022 1417  Last data filed at 9/20/2022 0932  Gross per 24 hour   Intake 514 ml   Output 450 ml   Net 64 ml             CPAP/EPAP: 8 cmH2O     CURRENT MEDS :  Scheduled Meds:   hydrocortisone   Rectal BID    desmopressin  200 mcg Oral Nightly    [Held by provider] furosemide  40 mg IntraVENous BID    guaiFENesin  400 mg Oral TID    dilTIAZem  30 mg Oral TID    escitalopram  10 mg Oral BID    famotidine  20 mg Oral Daily    ferrous sulfate  325 mg Oral BID WC    levothyroxine  75 mcg Oral QAM AC    metoprolol succinate  100 mg Oral Daily    predniSONE  5 mg Oral Daily    budesonide  0.25 mg Nebulization BID    And    Arformoterol Tartrate  15 mcg Nebulization BID    enoxaparin  40 mg SubCUTAneous Daily    sodium chloride flush  10 mL IntraVENous 2 times per day       Physical Exam:  General Appearance: appears comfortable in no acute distress.    HEENT: Normocephalic atraumatic without obvious abnormality   Neck: Lips, mucosa, and tongue normal. Supple, 03/11/2022 11:29 AM    PROTIME 12.7 05/02/2012 07:40 PM    INR 1.0 03/11/2022 11:29 AM     Recent Labs     09/19/22  0923   PROBNP 8,729*       No results for input(s): PROCAL in the last 72 hours. This SmartLink has not been configured with any valid records. Abg 9/16/22  7.22/72/330/29/0.6/99    Micro:  No results for input(s): CULTRESP in the last 72 hours. No results for input(s): LABGRAM in the last 72 hours. No results for input(s): LEGUR in the last 72 hours. No results for input(s): STREPNEUMAGU in the last 72 hours. No results for input(s): LP1UAG in the last 72 hours. Assessment:    Acute on chronic respiratory failure with hypoxia and hypercapnia  GI bleed, Anemia  Pulmonary sarcoidosis  Restrictive lung disease  Obesity hypoventilation syndrome  LINDSAY  Chronic home O2 dependence 4 liters NC  Severe pulmonary hypertension WHO group 2, 3-chronic  Stage III severe COPD by Gold classification  Acute on chronic congestive heart failure with preserved EF  Moderate MR, Moderate TR  Chronic dyspnea  Prescott's disease  Diabetes mellitus II  Stage III chronic kidney disease  History of nicotine dependence  PAF  Obesity, BMI 35  Debility       Plan:   Wean oxygen as tolerated keep SpO2 between 88-92%, on 4L NC. Baseline 4 L at home  AVAPS during the day, can come off for meals until mentation improves. Wean FiO2 as tolerated  ABG to be drawn  Bronchodilators: Brovana/Pulmicort twice daily, albuterol as needed  IRMA management per nephrology. Diuresis stopped   Chronic prednisone 5 mg daily  GI/DVT prophylaxis   PT, OT evaluation when able      This plan of care was reviewed in collaboration with Dr. Anitra Gambino  Electronically signed by FRIDA Castellanos CNP on 9/20/2022 at 2:17 PM      Addendum:  There is no significant improvement in patient's PCO2 since she refused to wear her NIV last night.   I discussed with nursing staff and patient that we are going to keep her mask on overnight or tomorrow morning repeat an ABG. Patient significant history of noncompliance in the past I am going to consult palliative to address goals of care and CODE STATUS. I personally saw, examined and provided care for the patient. Radiographs, labs and medication list were reviewed by me independently. I spoke with bedside nursing, therapists and consultants. The case was discussed in detail and plans for care were established. Review of CNP documentation was conducted and revisions were made as appropriate. I agree with the above documented exam, problem list and plan of care.    Deshaun Burrell MD

## 2022-09-20 NOTE — PROGRESS NOTES
Pt had RN take Bipap off intermittently throughout night stating she was unable to tolerate it. RN attempted to educate pt about the importance of her wearing it, however she did not comply.  She has been placed back on 4L NC

## 2022-09-21 LAB
ANION GAP SERPL CALCULATED.3IONS-SCNC: 11 MMOL/L (ref 7–16)
ANISOCYTOSIS: ABNORMAL
BASOPHILS ABSOLUTE: 0 E9/L (ref 0–0.2)
BASOPHILS RELATIVE PERCENT: 0.5 % (ref 0–2)
BUN BLDV-MCNC: 20 MG/DL (ref 6–23)
CALCIUM SERPL-MCNC: 8.7 MG/DL (ref 8.6–10.2)
CHLORIDE BLD-SCNC: 98 MMOL/L (ref 98–107)
CO2: 27 MMOL/L (ref 22–29)
CREAT SERPL-MCNC: 1.1 MG/DL (ref 0.5–1)
EOSINOPHILS ABSOLUTE: 0.3 E9/L (ref 0.05–0.5)
EOSINOPHILS RELATIVE PERCENT: 7.8 % (ref 0–6)
GFR AFRICAN AMERICAN: >60
GFR NON-AFRICAN AMERICAN: >60 ML/MIN/1.73
GLUCOSE BLD-MCNC: 69 MG/DL (ref 74–99)
HCT VFR BLD CALC: 30.3 % (ref 34–48)
HEMOGLOBIN: 9 G/DL (ref 11.5–15.5)
LYMPHOCYTES ABSOLUTE: 0.34 E9/L (ref 1.5–4)
LYMPHOCYTES RELATIVE PERCENT: 8.7 % (ref 20–42)
MCH RBC QN AUTO: 24.9 PG (ref 26–35)
MCHC RBC AUTO-ENTMCNC: 29.7 % (ref 32–34.5)
MCV RBC AUTO: 83.9 FL (ref 80–99.9)
METAMYELOCYTES RELATIVE PERCENT: 0.9 % (ref 0–1)
METER GLUCOSE: 105 MG/DL (ref 74–99)
MONOCYTES ABSOLUTE: 0.46 E9/L (ref 0.1–0.95)
MONOCYTES RELATIVE PERCENT: 12.2 % (ref 2–12)
NEUTROPHILS ABSOLUTE: 2.7 E9/L (ref 1.8–7.3)
NEUTROPHILS RELATIVE PERCENT: 70.4 % (ref 43–80)
NUCLEATED RED BLOOD CELLS: 0.9 /100 WBC
OVALOCYTES: ABNORMAL
PDW BLD-RTO: 15.8 FL (ref 11.5–15)
PLATELET # BLD: 244 E9/L (ref 130–450)
PMV BLD AUTO: 10.9 FL (ref 7–12)
POIKILOCYTES: ABNORMAL
POLYCHROMASIA: ABNORMAL
POTASSIUM SERPL-SCNC: 4.1 MMOL/L (ref 3.5–5)
RBC # BLD: 3.61 E12/L (ref 3.5–5.5)
ROULEAUX: ABNORMAL
SCHISTOCYTES: ABNORMAL
SODIUM BLD-SCNC: 136 MMOL/L (ref 132–146)
WBC # BLD: 3.8 E9/L (ref 4.5–11.5)

## 2022-09-21 PROCEDURE — 2580000003 HC RX 258: Performed by: FAMILY MEDICINE

## 2022-09-21 PROCEDURE — 6360000002 HC RX W HCPCS: Performed by: FAMILY MEDICINE

## 2022-09-21 PROCEDURE — 94660 CPAP INITIATION&MGMT: CPT

## 2022-09-21 PROCEDURE — 97530 THERAPEUTIC ACTIVITIES: CPT

## 2022-09-21 PROCEDURE — 97535 SELF CARE MNGMENT TRAINING: CPT

## 2022-09-21 PROCEDURE — 80048 BASIC METABOLIC PNL TOTAL CA: CPT

## 2022-09-21 PROCEDURE — 6370000000 HC RX 637 (ALT 250 FOR IP): Performed by: FAMILY MEDICINE

## 2022-09-21 PROCEDURE — 6370000000 HC RX 637 (ALT 250 FOR IP)

## 2022-09-21 PROCEDURE — 2140000000 HC CCU INTERMEDIATE R&B

## 2022-09-21 PROCEDURE — 82962 GLUCOSE BLOOD TEST: CPT

## 2022-09-21 PROCEDURE — 85025 COMPLETE CBC W/AUTO DIFF WBC: CPT

## 2022-09-21 PROCEDURE — 36415 COLL VENOUS BLD VENIPUNCTURE: CPT

## 2022-09-21 PROCEDURE — 97165 OT EVAL LOW COMPLEX 30 MIN: CPT

## 2022-09-21 PROCEDURE — 94640 AIRWAY INHALATION TREATMENT: CPT

## 2022-09-21 PROCEDURE — 2700000000 HC OXYGEN THERAPY PER DAY

## 2022-09-21 PROCEDURE — 6370000000 HC RX 637 (ALT 250 FOR IP): Performed by: INTERNAL MEDICINE

## 2022-09-21 PROCEDURE — 2580000003 HC RX 258: Performed by: INTERNAL MEDICINE

## 2022-09-21 PROCEDURE — 6370000000 HC RX 637 (ALT 250 FOR IP): Performed by: STUDENT IN AN ORGANIZED HEALTH CARE EDUCATION/TRAINING PROGRAM

## 2022-09-21 RX ADMIN — FAMOTIDINE 20 MG: 20 TABLET ORAL at 10:31

## 2022-09-21 RX ADMIN — ENOXAPARIN SODIUM 40 MG: 100 INJECTION SUBCUTANEOUS at 10:40

## 2022-09-21 RX ADMIN — OXYCODONE AND ACETAMINOPHEN 2 TABLET: 5; 325 TABLET ORAL at 10:39

## 2022-09-21 RX ADMIN — ARFORMOTEROL TARTRATE 15 MCG: 15 SOLUTION RESPIRATORY (INHALATION) at 09:00

## 2022-09-21 RX ADMIN — ESCITALOPRAM OXALATE 10 MG: 10 TABLET ORAL at 20:49

## 2022-09-21 RX ADMIN — LEVOTHYROXINE SODIUM 75 MCG: 0.07 TABLET ORAL at 05:31

## 2022-09-21 RX ADMIN — GUAIFENESIN 400 MG: 400 TABLET ORAL at 10:31

## 2022-09-21 RX ADMIN — DILTIAZEM HYDROCHLORIDE 30 MG: 30 TABLET, FILM COATED ORAL at 05:31

## 2022-09-21 RX ADMIN — ARFORMOTEROL TARTRATE 15 MCG: 15 SOLUTION RESPIRATORY (INHALATION) at 20:24

## 2022-09-21 RX ADMIN — PREDNISONE 5 MG: 5 TABLET ORAL at 10:32

## 2022-09-21 RX ADMIN — DESMOPRESSIN ACETATE 200 MCG: 0.1 TABLET ORAL at 20:49

## 2022-09-21 RX ADMIN — LOPERAMIDE HYDROCHLORIDE 2 MG: 2 CAPSULE ORAL at 00:49

## 2022-09-21 RX ADMIN — BUDESONIDE 250 MCG: 0.25 SUSPENSION RESPIRATORY (INHALATION) at 09:00

## 2022-09-21 RX ADMIN — DILTIAZEM HYDROCHLORIDE 30 MG: 30 TABLET, FILM COATED ORAL at 10:32

## 2022-09-21 RX ADMIN — METOPROLOL SUCCINATE 100 MG: 100 TABLET, FILM COATED, EXTENDED RELEASE ORAL at 10:32

## 2022-09-21 RX ADMIN — FERROUS SULFATE TAB 325 MG (65 MG ELEMENTAL FE) 325 MG: 325 (65 FE) TAB at 17:11

## 2022-09-21 RX ADMIN — ESCITALOPRAM OXALATE 10 MG: 10 TABLET ORAL at 10:32

## 2022-09-21 RX ADMIN — PETROLATUM: 420 OINTMENT TOPICAL at 20:51

## 2022-09-21 RX ADMIN — DILTIAZEM HYDROCHLORIDE 30 MG: 30 TABLET, FILM COATED ORAL at 20:49

## 2022-09-21 RX ADMIN — SODIUM CHLORIDE: 9 INJECTION, SOLUTION INTRAVENOUS at 00:03

## 2022-09-21 RX ADMIN — FERROUS SULFATE TAB 325 MG (65 MG ELEMENTAL FE) 325 MG: 325 (65 FE) TAB at 10:32

## 2022-09-21 RX ADMIN — BUDESONIDE 250 MCG: 0.25 SUSPENSION RESPIRATORY (INHALATION) at 20:24

## 2022-09-21 RX ADMIN — Medication 10 ML: at 10:33

## 2022-09-21 RX ADMIN — HYDROCORTISONE: 25 CREAM TOPICAL at 10:34

## 2022-09-21 RX ADMIN — GUAIFENESIN 400 MG: 400 TABLET ORAL at 20:49

## 2022-09-21 ASSESSMENT — PAIN SCALES - GENERAL
PAINLEVEL_OUTOF10: 0
PAINLEVEL_OUTOF10: 8
PAINLEVEL_OUTOF10: 0
PAINLEVEL_OUTOF10: 0

## 2022-09-21 ASSESSMENT — PAIN DESCRIPTION - LOCATION: LOCATION: BACK;SHOULDER

## 2022-09-21 ASSESSMENT — PAIN DESCRIPTION - DESCRIPTORS: DESCRIPTORS: ACHING;DISCOMFORT;NAGGING

## 2022-09-21 ASSESSMENT — PAIN DESCRIPTION - ORIENTATION: ORIENTATION: RIGHT;LEFT;MID;LOWER

## 2022-09-21 NOTE — PROGRESS NOTES
Occupational Therapy  OCCUPATIONAL THERAPY INITIAL EVALUATION    KARAN Hutton [email or web address] [address] [address]      Date:2022                                                Patient Name: Sarah Moon  MRN: [id number]  : 1955  Room: [address] [place] [address] [id number]    Referring Provider: FRIDA Sands CNP  Specific Provider Orders/Date: OT eval and treat 22    Diagnosis: UTI (urinary tract infection) [N39.0]   Pt admitted to hospital on 22 for rectal bleeding      Pertinent Medical History:  has a past medical history of A-fib ([address] Able to transfer from chair to wheelchair, Acute on chronic respiratory failure ([address] Anemia due to chronic illness, Ankle fracture, left, 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[address] Backache, Benign hypertension, CHF (congestive heart failure) ([address] Chronic back pain, Chronic kidney disease, Chronic pain disorder, Chronic, continuous use of opioids, Compression fracture of thoracic vertebra ([address] COPD, Debility, Deformity of ankle and foot, acquired, Depression, Diabetes insipidus ([address] Encephalopathy acute, Gait disturbance, GERD, Hernia, Clostridium difficile, Ischemic colitis, enteritis, or enterocolitis ([address] Long term (current) use of systemic steroids, Long-term current use of steroids, Lumbar disc herniation, Myalgia and myositis, unspecified, Nephrosclerosis, Nonunion of fracture, Osteoarthritis, PAF, 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[address] Pulmonary hypertension ([address] Pulmonary sarcoidosis ([address] Rectal bleeding, Rhabdomyolysis, Steroid-induced avascular necrosis of shoulder ([address] Tibia fracture, and Ventral hernia.        Precautions:  Fall Risk, ramirez, cognition, continuous pulse ox, O2, TAPS, bed alarm  Chronic L Charcot deformity, h/o R tibia fx resulting in varus deformity    Assessment of current deficits    [x] Functional mobility  [x]ADLs  [x] Strength               [x]Cognition    [x] Functional transfers   [x] IADLs         [x] Safety Awareness   [x]Endurance    [] Fine Coordination              [x] Balance      [] Vision/perception   []Sensation     []Gross Motor Coordination  [] ROM  [] Delirium                   [] Motor Control     OT PLAN OF CARE   OT POC based on physician orders, patient diagnosis and results of clinical assessment    Frequency/Duration 1-3 days/wk for 2 weeks PRN   Specific OT Treatment Interventions to include:   * Instruction/training on adapted ADL techniques and AE recommendations to increase functional independence within precautions       * Training on energy conservation strategies, correct breathing pattern and techniques to improve independence/tolerance for self-care routine  * Functional transfer/mobility training/DME recommendations for increased independence, safety, and fall prevention  * Patient/Family education to increase follow through with safety techniques and functional independence  * Recommendation of environmental modifications for increased safety with functional transfers/mobility and ADLs  * Cognitive retraining/development of therapeutic activities to improve problem solving, judgement, memory, and attention for increased safety/participation in ADL/IADL tasks  * Therapeutic exercise to improve motor endurance, ROM, and functional strength for ADLs/functional transfers  * Therapeutic activities to facilitate/challenge dynamic balance, stand tolerance for increased safety and independence with ADLs  * Therapeutic activities to facilitate gross/fine motor skills for increased independence with ADLs  * Positioning to improve skin integrity, interaction with environment and functional independence      Recommended Adaptive Equipment: TBD     Home Living: Pt lives with spouse in 1 floor home.  Ramped entry    Bathroom setup: sponge bathes, utilizes BSC for toileting needs    Equipment owned: power w/c, BSC, Therapist educated pt on role of OT. At end of session, patient lying in bed (bd alarm on) with call light and phone within reach, all lines and tubes intact. Overall patient demonstrated decreased independence and safety during completion of ADL/functional transfer/mobility tasks. Pt would benefit from continued skilled OT to increase safety and independence with completion of ADL/IADL tasks for functional independence and quality of life. Treatment: OT treatment provided this date includes:   Therapist facilitated self-care retraining: UB/LB self-care tasks (bathing task, donned/doffed gown, socks), toileting task (incontinent) and seated grooming tasks while educating pt on modified techniques, posture, safety and energy conservation techniques. Facilitation of bed mobility (education/cues for body mechanics), unsupported sitting balance (addressing posture, safety, weight shifting, dynamic reaching to prep for ADL's. Pt tolerated ~20 minutes EOB for static and dynamic tasks) and functional transfers   (w/ education/cues for safety/hand placement). Therapist then facilitated bed repositioning for comfort. Increased time required for all functional tasks d/t noted fatigue. Skilled monitoring of HR, O2 sats and pts response to treatment. Pt on 4L O2 via nasal cannula. At rest: O2 sat=^93%  During/post rolling task: O2 sat=^90%  During EOB activity: O2 sat=95-98%  End of session: O2 sat=94%    Rehab Potential: Good for established goals     Patient / Family Goal: not stated      Patient and/or family were instructed on functional diagnosis, prognosis/goals and OT plan of care. Demonstrated fair- understanding.      Eval Complexity: Low    Time In: 11:20  Time Out: 12:02  Total Treatment Time: 25 minutes    Min Units   OT Eval Low 97165  X  1   OT Eval Medium 74324      OT Eval High 02781      OT Re-Eval [id number]       Therapeutic Ex 90501       Therapeutic Activities [id number]  31  1   ADL/Self Care [id number]  15 1   Orthotic Management [id number]       Manual [id number]     Neuro Re-Ed [id number]       Non-Billable Time          Evaluation Time additionally includes thorough review of current medical information, gathering information on past medical history/social history and prior level of function, interpretation of standardized testing/informal observation of tasks, assessment of data and development of plan of care and goals.         [place], OTR/L #[id number]

## 2022-09-21 NOTE — PROGRESS NOTES
Hospitalist progress note    Patient:  Sarah Moon  YOB: 1955  Date of Service: 9/21/22  MRN: [id number]   Acct:  [de-identified]   Primary Care Physician: Clifford Prado MD  9/12/2022     Patient Seen, Chart, Consults notes, Labs, Radiology, family and social history were reviewed. Assessment and Plan:    Rectal bleeding, improved  Respiratory failure, stable on BiPAP machine  Acute kidney injury, getting better her creatinine today is 1.1  UTI, antibiotics were stopped because clean culture  Diarrhea, order C. difficile and stool culture  Elevated BNP, continue Lasix ejection fraction was 60%  Anemia : monitor hemoglobin   Chest x-ray shows no acute process  Continue DVT &  GERD prophylaxis    Mild leukopenia, monitor  History of hypertension, continue Cardizem  History of depression, continue Lexapro  Severe pulmonary hypertension, no change            Chief Complaint: Diarrhea        Subjective        Is a 73years old female patient comes emergency room because of abdominal pain and rectal bleeding. Patient came with suspected UTI and will start the patient antibiotics patient has acute renal failure and we consulted nephrologist patient also has diarrhea which was not getting better by the time so we ordered stool culture and C. Difficile. Patient had echo and shows ejection fraction of 60% we will continue IV fluid because of the dehydration because of diarrhea and acute kidney injury  Patient has elevated BNP and we consulted nephrologist and her kidney function was getting better over the time  Patient has severe hypoxia and she refused BiPAP and so we consulted pulmonary and the patient agreed to wear the BiPAP her oxygen level was getting better over the time. Patient still complains of diarrhea condition is not getting better she denies any any mucus in the stool and she denies any fever or chills no nausea or vomiting.   The patient denied any chest pain, shortness of breath , Contrast? None    Result Date: 9/10/2022  EXAMINATION: CT OF THE ABDOMEN AND PELVIS WITH CONTRAST 9/10/2022 5:12 pm TECHNIQUE: CT of the abdomen and pelvis was performed with the administration of intravenous contrast. Multiplanar reformatted images are provided for review. Automated exposure control, iterative reconstruction, and/or weight based adjustment of the mA/kV was utilized to reduce the radiation dose to as low as reasonably achievable. COMPARISON: July 14, 2022 HISTORY: ORDERING SYSTEM PROVIDED HISTORY: Abdominal pain, prior abdominal hernia repair TECHNOLOGIST PROVIDED HISTORY: Additional Contrast?->None Reason for exam:->Abdominal pain, prior abdominal hernia repair Decision Support Exception - unselect if not a suspected or confirmed emergency medical condition->Emergency Medical Condition (MA) FINDINGS: Redemonstration of broad-based ventral abdominal wall hernia measuring approximately 9 cm in AP dimension with base of approximately 13 cm in axial dimension. Multiple segments of bowel and mesenteric fat are present within herniation sac. No bowel obstruction or strangulation. Multiple diverticula involving the left colon. Postsurgical changes related to bowel resection present in right lower abdomen. The appendix is not definitively visualized, however no focal inflammatory changes in its expected location. No intrahepatic or extrahepatic bile duct dilatation. Multiple tiny gallstones layer in the gallbladder. No evidence of acute pancreatitis. Spleen is normal in size. Adrenal glands are normal.  No hydronephrosis or perinephric edema. No retroperitoneal lymphadenopathy. Urinary bladder is grossly unremarkable yet not optimally distended. Irregular opacities are present in the lung bases slightly decreased compared to prior. 1. Stable ventral abdominal wall hernia. 2. No bowel obstruction, free air, or free fluid. 3. Diverticulosis 4. Cholelithiasis 5.  Redemonstration of opacities in lung bases suggestive of subsegmental atelectasis and chronic interstitial lung disease. XR CHEST PORTABLE    Result Date: 9/20/2022  EXAMINATION: ONE XRAY VIEW OF THE CHEST 9/20/2022 3:07 pm COMPARISON: None. HISTORY: ORDERING SYSTEM PROVIDED HISTORY: R/o pulmonary edema TECHNOLOGIST PROVIDED HISTORY: Reason for exam:->R/o pulmonary edema What reading provider will be dictating this exam?->CRC FINDINGS: The lungs are without acute focal process. There is no effusion or pneumothorax. Cardiomegaly. The osseous structures are without acute process. Degenerative changes left glenohumeral joint. No acute process. Stable cardiomegaly. XR CHEST PORTABLE    Result Date: 9/18/2022  EXAMINATION: ONE XRAY VIEW OF THE CHEST 9/18/2022 8:59 am COMPARISON: 09/16/2022 HISTORY: ORDERING SYSTEM PROVIDED HISTORY: pulmonary edema TECHNOLOGIST PROVIDED HISTORY: Reason for exam:->pulmonary edema What reading provider will be dictating this exam?->CRC FINDINGS: No pneumothorax. .  The cardiomediastinal silhouette is without acute process. The osseous structures are without acute process. Stable heart size with mild pulmonary vascular congestive changes. Stable heart size with mild pulmonary vascular congestive changes.      Recent Results (from the past 24 hour(s))   Blood Gas, Arterial    Collection Time: 09/20/22  3:23 PM   Result Value Ref Range    Date Analyzed [id number]     Time Analyzed 1523     Source: Blood Arterial     pH, Blood Gas 7.298 (L) 7.350 - 7.450    PCO2 65.8 (H) 35.0 - 45.0 mmHg    PO2 151.5 (H) 75.0 - 100.0 mmHg    HCO3 31.5 (H) 22.0 - 26.0 mmol/L    B.E. 3.6 (H) -3.0 - 3.0 mmol/L    O2 Sat 98.7 (H) 92.0 - 98.5 %    PO2/FIO2 3.03 mmHg/%    AaDO2 118.5 mmHg    RI(T) 0.78     O2Hb 97.8 (H) 94.0 - 97.0 %    COHb 0.5 0.0 - 1.5 %    MetHb 0.5 0.0 - 1.5 %    HHb 1.2 0.0 - 5.0 %    tHb (est) 11.1 (L) 11.5 - 16.5 g/dL    Mode AVAPS     FIO2 50.0 %    Rr Mechanical 20.0 b/min    Vt Mechanical 450.0 mL Peep/Cpap 8.0 cmH2O    Date Of Collection      Time Collected      Pt Temp 37.0 C     ID [id number]     Lab [id number]     Critical(s) Notified .  No Critical Values    Basic Metabolic Panel    Collection Time: 09/20/22  4:59 PM   Result Value Ref Range    Sodium 133 132 - 146 mmol/L    Potassium 4.0 3.5 - 5.0 mmol/L    Chloride 96 (L) 98 - 107 mmol/L    CO2 26 22 - 29 mmol/L    Anion Gap 11 7 - 16 mmol/L    Glucose 85 74 - 99 mg/dL    BUN 22 6 - 23 mg/dL    Creatinine 1.5 (H) 0.5 - 1.0 mg/dL    GFR Non-African American 42 >=60 mL/min/1.73    GFR African American 42     Calcium 8.8 8.6 - 10.2 mg/dL   ]     Current Facility-Administered Medications: hydrocortisone (ANUSOL-HC) 2.5 % rectal cream, , Rectal, BID  0.9 % sodium chloride infusion, , IntraVENous, Continuous  desmopressin (DDAVP) tablet 200 mcg, 200 mcg, Oral, Nightly  [Held by provider] furosemide (LASIX) injection 40 mg, 40 mg, IntraVENous, BID  guaiFENesin tablet 400 mg, 400 mg, Oral, TID  perflutren lipid microspheres (DEFINITY) injection 1.65 mg, 1.5 mL, IntraVENous, ONCE PRN  white petrolatum ointment, , Topical, BID PRN  bismuth subsalicylate (PEPTO BISMOL) 262 MG/15ML suspension 30 mL, 30 mL, Oral, Q4H PRN  potassium chloride (KLOR-CON M) extended release tablet 40 mEq, 40 mEq, Oral, PRN **OR** potassium bicarb-citric acid (EFFER-K) effervescent tablet 40 mEq, 40 mEq, Oral, PRN **OR** potassium chloride 10 mEq/100 mL IVPB (Peripheral Line), 10 mEq, IntraVENous, PRN  glucose chewable tablet 16 g, 4 tablet, Oral, PRN  dextrose bolus 10% 125 mL, 125 mL, IntraVENous, PRN **OR** dextrose bolus 10% 250 mL, 250 mL, IntraVENous, PRN  glucagon (rDNA) injection 1 mg, 1 mg, SubCUTAneous, PRN  dextrose 10 % infusion, , IntraVENous, Continuous PRN  promethazine-codeine (PHENERGAN with CODEINE) 6.25-10 MG/5ML solution 5 mL, 5 mL, Oral, Q4H PRN  loperamide (IMODIUM) capsule 2 mg, 2 mg, Oral, 4x Daily PRN  iopamidol (ISOVUE-370) 76 % injection 75 mL, 75 mL, IntraVENous,

## 2022-09-21 NOTE — CARE COORDINATION
Per nursing rounds patient off of Bipap, currently on 4L NC (baseline at home) and continues on IV Lasix (BID); nephrology and pulmonology following. C. difficile and stool culture ordered and patient pending palliative consult. ANA list provided for patient's  yesterday; awaiting choices. SW missed call from patient's  today, returned the call but no answer; left message to call back. Patient pending PT/OT evaluations. Ambulance form completed, 7000 will need completed once accepted at a facility and envelope placed on the soft chart. 3:55P  Received a call from patient's  with ANA choices. Ana choices are Nilesh at [facility] and [facility]. Call made to Anthony Woodruff at [facility], no answer; left a message to return the call. The Plan for Transition of Care is related to the following treatment goals: discharge planning    The Patient and/or patient representative Nickolas West was provided with a choice of provider and agrees with the discharge plan. [x] Yes [] No    Freedom of choice list was provided with basic dialogue that supports the patient's individualized plan of care/goals, treatment preferences and shares the quality data associated with the providers.  [x] Yes [] No     Basil Saravia, ILANA, LSW [phone]

## 2022-09-21 NOTE — PROGRESS NOTES
Patient tolerated Bipap throughout the night. Patient taken off Bipap to give morning meds. Placed on 4L NC and O2 sats at 100%. Left on NC per patients request. Educated patient she will have to go back on Bipap after a little while.

## 2022-09-21 NOTE — PROGRESS NOTES
Department of Internal Medicine  Nephrology Progress Note    Events reviewed. SUBJECTIVE:  We are following Mrs. Anthony Rice for IRMA. Reports feeling better.      PHYSICAL EXAM:      Vitals:    VITALS:  /78   Pulse 66   Temp 98.4 °F (36.9 °C) (Oral)   Resp 18   Ht 5' (1.524 m)   Wt 192 lb 4.8 oz (87.2 kg)   LMP  (LMP Unknown)   SpO2 100%   BMI 37.56 kg/m²   24HR INTAKE/OUTPUT:    Intake/Output Summary (Last 24 hours) at 9/21/2022 1048  Last data filed at 9/21/2022 0910  Gross per 24 hour   Intake 560 ml   Output 925 ml   Net -365 ml         Constitutional: Drowsy, follows commands, responds to voice  HEENT:  PERRLA  Respiratory:  CTA  Cardiovascular/Edema:  Normal S1/S2, RRR, no murmur, no edema  Gastrointestinal:  Soft, non-distended  Neurologic:  non-focal  Skin:  No rashes, lesions     Scheduled Meds:   hydrocortisone   Rectal BID    desmopressin  200 mcg Oral Nightly    [Held by provider] furosemide  40 mg IntraVENous BID    guaiFENesin  400 mg Oral TID    dilTIAZem  30 mg Oral TID    escitalopram  10 mg Oral BID    famotidine  20 mg Oral Daily    ferrous sulfate  325 mg Oral BID WC    levothyroxine  75 mcg Oral QAM AC    metoprolol succinate  100 mg Oral Daily    predniSONE  5 mg Oral Daily    budesonide  0.25 mg Nebulization BID    And    Arformoterol Tartrate  15 mcg Nebulization BID    enoxaparin  40 mg SubCUTAneous Daily    sodium chloride flush  10 mL IntraVENous 2 times per day     Continuous Infusions:   sodium chloride 60 mL/hr at 09/21/22 0003    dextrose      sodium chloride       PRN Meds:.perflutren lipid microspheres, white petrolatum, bismuth subsalicylate, potassium chloride **OR** potassium alternative oral replacement **OR** potassium chloride, glucose, dextrose bolus **OR** dextrose bolus, glucagon (rDNA), dextrose, promethazine-codeine, loperamide, iopamidol, oxyCODONE-acetaminophen, albuterol, sodium chloride flush, sodium chloride, [DISCONTINUED] promethazine **OR** ondansetron, polyethylene glycol, acetaminophen **OR** acetaminophen    DATA:    CBC with Differential:    Lab Results   Component Value Date/Time    WBC 3.8 09/21/2022 07:23 AM    RBC 3.61 09/21/2022 07:23 AM    RBC 3.57 12/14/2021 09:57 AM    HGB 9.0 09/21/2022 07:23 AM    HCT 30.3 09/21/2022 07:23 AM     09/21/2022 07:23 AM    MCV 83.9 09/21/2022 07:23 AM    MCH 24.9 09/21/2022 07:23 AM    MCHC 29.7 09/21/2022 07:23 AM    RDW 15.8 09/21/2022 07:23 AM    NRBC 0.0 09/10/2022 01:23 PM    SEGSPCT 77 01/15/2014 04:00 PM    BANDSPCT 1 06/25/2014 05:45 AM    METASPCT 0.9 09/15/2022 11:32 AM    LYMPHOPCT 16.8 09/20/2022 06:28 AM    LYMPHOPCT 14.5 12/14/2021 09:57 AM    PROMYELOPCT 0.9 09/10/2022 01:23 PM    MONOPCT 18.1 09/20/2022 06:28 AM    MYELOPCT 0.9 03/01/2021 04:39 PM    BASOPCT 0.4 09/20/2022 06:28 AM    MONOSABS 0.84 09/20/2022 06:28 AM    LYMPHSABS 0.78 09/20/2022 06:28 AM    EOSABS 0.24 09/20/2022 06:28 AM    BASOSABS 0.02 09/20/2022 06:28 AM     CMP:    Lab Results   Component Value Date/Time     09/21/2022 07:23 AM    K 4.1 09/21/2022 07:23 AM    K 4.1 09/19/2022 04:53 AM    CL 98 09/21/2022 07:23 AM    CO2 27 09/21/2022 07:23 AM    BUN 20 09/21/2022 07:23 AM    CREATININE 1.1 09/21/2022 07:23 AM    GFRAA >60 09/21/2022 07:23 AM    LABGLOM >60 09/21/2022 07:23 AM    GLUCOSE 69 09/21/2022 07:23 AM    GLUCOSE 116 05/02/2012 07:40 PM    PROT 5.4 09/13/2022 06:05 AM    LABALBU 2.9 09/13/2022 06:05 AM    LABALBU 3.6 05/02/2012 07:40 PM    CALCIUM 8.7 09/21/2022 07:23 AM    BILITOT 0.3 09/13/2022 06:05 AM    ALKPHOS 61 09/13/2022 06:05 AM    AST 17 09/13/2022 06:05 AM    ALT 8 09/13/2022 06:05 AM     Magnesium:    Lab Results   Component Value Date/Time    MG 2.4 09/16/2022 05:08 AM     Phosphorus:    Lab Results   Component Value Date/Time    PHOS 3.4 03/01/2021 04:39 PM     Radiology Review:        CT Abd/Pelvis 9/15/22   1. Gas fluid levels within the colon may reflect a diarrheal process. 2.  Postop changes in the abdomen as described. No evidence of bowel   obstruction. No free air or free fluid. 3.  Stable lung base opacities and chronic interstitial lung disease. CXR 9/20/2022   No acute process. Stable cardiomegaly. BRIEF SUMMARY OF INITIAL CONSULT:    Mrs. Phillip Sanchez is a 73 y.o. 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 On September 12 2022, patient presented to the ED again for rectal bleeding. Labs were significant for chloride 94, bicarbonate 30, creatinine 2.0. Renal function improved with IVF administration, creatinine went down to 0.9 mg/dL on September 14. Since then his creatinine level has progressively increased up to 1.9 mg/dL, reason for this consultation. Review of her records showed that on September 14 she was started on furosemide and the following day on September 15 she had a CT abdomen and pelvis with IV contrast.    IMPRESSION/RECOMMENDATIONS:      IRMA stage II, likely volume responsive prerenal IRMA versus ATN 2/2 contrast induced IRMA in the setting of diuretic administration. Patient received IV contrast on 9/10/22 with recovery of renal function and received contrast again on 9/15/22. Renal function improved with IV fluid administration, creatinine down to 1.1 mg/dL.      Central DI 2/2 sarcoidosis, on DDAVP, continue with only bedside dosing  Secondary adrenal insufficiency, 2/2 sarcoidosis induced hypopituitarism, on prednisone   Acidemia (pH 7.229), with respiratory acidosis and metabolic acidosis secondary to diarrhea  HTN, on metoprolol  HFpEF, 60%, on metoprolol, Echo pending, Lasix on hold. Pro-BNP 13,419 >>8,729  ---------------------------------------------------------------  Pulmonary HTN  UTI, s/p ceftriaxone   PAF, on diltiazem and metoprolol  Acute on chronic respiratory failure 2/2 sarcoidosis, on intermittent NIPPV   Hypothyroidism, probably secondary  Anemia, normocytic     Plan:    Discontinue IV fluids.   Continue prednisone 5 mg PO daily   Continue to hold Lasix for now  Continue DDAVP 200 mcg nightly  Strict I&Os  Continue to monitor renal function, BMP 4 PM  Repeat chest x-ray      Electronically signed by Briseyda Umana MD on 9/21/2022 at 10:48 AM

## 2022-09-21 NOTE — PROGRESS NOTES
Imani Hale M.D.,[facility]  Liliana Cunningham D.O., F.A.C.O.I., Claudeen Stallion, M.D. Jorge Ashraf M.D. Trudy Burns D.O. Daily Pulmonary Progress Note    Patient:  Florencio Jay 73 y.o. female MRN: [id number]     Date of Service: 9/21/2022      Synopsis     We are following patient for respiratory failure, sarcoidosis     \"CC\"  rectal bleeding    Code status: FULL       Subjective      Patient was seen and examined. Lying in bed 99% on 4 L nasal cannula, weaned to 2 L where she remains 98%. Educated patient on need for AVAPS therapy, she adamantly refuses and states she will not be wearing it tonight or ever. Again educated her on its importance. Discussed care with charge nurse. Review of Systems:  Denies shortness of breath, cough, chest pain, wheezing    24-hour events:  Increased AMS    Objective   Vitals: /82   Pulse 73   Temp 97.5 °F (36.4 °C) (Oral)   Resp 20   Ht 5' (1.524 m)   Wt 192 lb 4.8 oz (87.2 kg)   LMP  (LMP Unknown)   SpO2 98%   BMI 37.56 kg/m²     I/O:    Intake/Output Summary (Last 24 hours) at 9/21/2022 1504  Last data filed at 9/21/2022 0910  Gross per 24 hour   Intake 560 ml   Output 925 ml   Net -365 ml             CPAP/EPAP: 8 cmH2O     CURRENT MEDS :  Scheduled Meds:   hydrocortisone   Rectal BID    desmopressin  200 mcg Oral Nightly    [Held by provider] furosemide  40 mg IntraVENous BID    guaiFENesin  400 mg Oral TID    dilTIAZem  30 mg Oral TID    escitalopram  10 mg Oral BID    famotidine  20 mg Oral Daily    ferrous sulfate  325 mg Oral BID WC    levothyroxine  75 mcg Oral QAM AC    metoprolol succinate  100 mg Oral Daily    predniSONE  5 mg Oral Daily    budesonide  0.25 mg Nebulization BID    And    Arformoterol Tartrate  15 mcg Nebulization BID    enoxaparin  40 mg SubCUTAneous Daily    sodium chloride flush  10 mL IntraVENous 2 times per day       Physical Exam:  General Appearance: appears comfortable in no acute distress.    HEENT: Normocephalic atraumatic without obvious abnormality   Neck: Lips, mucosa, and tongue normal. Supple, symmetrical, trachea midline, no adenopathy;thyroid: no enlargement/tenderness/nodules or JVD. Lung: Breath sounds rhonchi. Respirations unlabored. Symmetrical expansion. Heart: RRR, normal S1, S2. No MRG  Abdomen: Soft, NT, ND. BS present x 4 quadrants. No bruit or organomegaly. Extremities: Pedal pulses 2+ symmetric b/l. Extremities normal, no cyanosis, clubbing, or edema. Musculokeletal: No joint swelling, no muscle tenderness. ROM normal in all joints of extremities. Neurologic: Mental status: Alert    Pertinent/ New Labs and Imaging Studies     Imaging Personally Reviewed:  CXR 9/20  No acute process. Stable cardiomegaly. Echo 7/16/22   Ejection fraction is visually estimated at 60%. No regional wall motion abnormalities seen. Moderate left ventricular concentric hypertrophy noted. Dilated right ventricle with reduced function. Left atrial volume index of 34 ml per meters squared BSA. Moderate mitral regurgitation is present. Moderate tricuspid regurgitation. Pulmonary hypertension is severe. RVSP is 70 mmHg.    No evidence for hemodynamically significant pericardial effusion      Labs:  Lab Results   Component Value Date/Time    WBC 3.8 09/21/2022 07:23 AM    HGB 9.0 09/21/2022 07:23 AM    HCT 30.3 09/21/2022 07:23 AM    MCV 83.9 09/21/2022 07:23 AM    MCH 24.9 09/21/2022 07:23 AM    MCHC 29.7 09/21/2022 07:23 AM    RDW 15.8 09/21/2022 07:23 AM     09/21/2022 07:23 AM    MPV 10.9 09/21/2022 07:23 AM     Lab Results   Component Value Date/Time     09/21/2022 07:23 AM    K 4.1 09/21/2022 07:23 AM    K 4.1 09/19/2022 04:53 AM    CL 98 09/21/2022 07:23 AM    CO2 27 09/21/2022 07:23 AM    BUN 20 09/21/2022 07:23 AM    CREATININE 1.1 09/21/2022 07:23 AM    LABALBU 2.9 09/13/2022 06:05 AM    LABALBU 3.6 05/02/2012 07:40 PM    CALCIUM 8.7 09/21/2022 07:23 AM    GFRAA >60 09/21/2022 07:23 AM    LABGLOM >60 09/21/2022 07:23 AM     Lab Results   Component Value Date/Time    PROTIME 11.0 03/11/2022 11:29 AM    PROTIME 12.7 05/02/2012 07:40 PM    INR 1.0 03/11/2022 11:29 AM     Recent Labs     09/19/22  0923   PROBNP 8,729*       No results for input(s): PROCAL in the last 72 hours. This SmartLink has not been configured with any valid records. Abg 9/16/22  7.22/72/330/29/0.6/99    Micro:  Nothing new      Assessment:    Acute on chronic respiratory failure with hypoxia and hypercapnia  GI bleed, Anemia  Pulmonary sarcoidosis  Restrictive lung disease  Obesity hypoventilation syndrome  LINDSAY  Chronic home O2 dependence 4 liters NC  Severe pulmonary hypertension WHO group 2, 3-chronic  Stage III severe COPD by Gold classification  Acute on chronic congestive heart failure with preserved EF  Moderate MR, Moderate TR  Chronic dyspnea  Thomas's disease  Diabetes mellitus II  Stage III chronic kidney disease  History of nicotine dependence  PAF  Obesity, BMI 35  Debility       Plan:   Wean oxygen as tolerated keep SpO2 between 88-92%, on 2L NC. Baseline 4 L at home  AVAPS HS and PRN- patient adamantly refusing  ABG improved yesterday after wearing AVAPS. Bronchodilators: Brovana/Pulmicort twice daily, albuterol as needed  IRMA management per nephrology  Chronic prednisone 5 mg daily  GI/DVT prophylaxis   PT, OT   Consult palliative care for goals of care, CODE STATUS discussion  Case management for placement       This plan of care was reviewed in collaboration with Dr. Gabriela Guadarrama  Electronically signed by FRIDA Lane CNP on 9/21/2022 at 3:04 PM      I personally saw, examined and provided care for the patient. Radiographs, labs and medication list were reviewed by me independently. Patient is slightly more awake and alert today. Still is adamant about not going to wear her BiPAP or NIV.   Given that she does have LINDSAY and obesity hypoventilation syndrome with hypercapnia during this admission and the fact that she refuses recommendations have consulted palliative care to discuss goals of care and CODE STATUS. I spoke with bedside nursing, therapists and consultants. The case was discussed in detail and plans for care were established. Review of CNP documentation was conducted and revisions were made as appropriate. I agree with the above documented exam, problem list and plan of care.    Kavita Dey MD

## 2022-09-22 LAB
ANION GAP SERPL CALCULATED.3IONS-SCNC: 14 MMOL/L (ref 7–16)
BASOPHILS ABSOLUTE: 0.02 E9/L (ref 0–0.2)
BASOPHILS RELATIVE PERCENT: 0.4 % (ref 0–2)
BUN BLDV-MCNC: 17 MG/DL (ref 6–23)
CALCIUM SERPL-MCNC: 8.8 MG/DL (ref 8.6–10.2)
CHLORIDE BLD-SCNC: 99 MMOL/L (ref 98–107)
CO2: 27 MMOL/L (ref 22–29)
CREAT SERPL-MCNC: 1 MG/DL (ref 0.5–1)
EOSINOPHILS ABSOLUTE: 0.23 E9/L (ref 0.05–0.5)
EOSINOPHILS RELATIVE PERCENT: 5.1 % (ref 0–6)
GFR AFRICAN AMERICAN: >60
GFR NON-AFRICAN AMERICAN: >60 ML/MIN/1.73
GLUCOSE BLD-MCNC: 88 MG/DL (ref 74–99)
HCT VFR BLD CALC: 31.7 % (ref 34–48)
HEMOGLOBIN: 9.4 G/DL (ref 11.5–15.5)
IMMATURE GRANULOCYTES #: 0.03 E9/L
IMMATURE GRANULOCYTES %: 0.7 % (ref 0–5)
LYMPHOCYTES ABSOLUTE: 0.61 E9/L (ref 1.5–4)
LYMPHOCYTES RELATIVE PERCENT: 13.4 % (ref 20–42)
MCH RBC QN AUTO: 25.1 PG (ref 26–35)
MCHC RBC AUTO-ENTMCNC: 29.7 % (ref 32–34.5)
MCV RBC AUTO: 84.5 FL (ref 80–99.9)
METER GLUCOSE: 105 MG/DL (ref 74–99)
METER GLUCOSE: 93 MG/DL (ref 74–99)
MONOCYTES ABSOLUTE: 0.67 E9/L (ref 0.1–0.95)
MONOCYTES RELATIVE PERCENT: 14.8 % (ref 2–12)
NEUTROPHILS ABSOLUTE: 2.98 E9/L (ref 1.8–7.3)
NEUTROPHILS RELATIVE PERCENT: 65.6 % (ref 43–80)
PDW BLD-RTO: 16.2 FL (ref 11.5–15)
PLATELET # BLD: 234 E9/L (ref 130–450)
PMV BLD AUTO: 10.2 FL (ref 7–12)
POTASSIUM SERPL-SCNC: 4.1 MMOL/L (ref 3.5–5)
RBC # BLD: 3.75 E12/L (ref 3.5–5.5)
SODIUM BLD-SCNC: 140 MMOL/L (ref 132–146)
WBC # BLD: 4.5 E9/L (ref 4.5–11.5)

## 2022-09-22 PROCEDURE — 2700000000 HC OXYGEN THERAPY PER DAY

## 2022-09-22 PROCEDURE — 6370000000 HC RX 637 (ALT 250 FOR IP)

## 2022-09-22 PROCEDURE — 6370000000 HC RX 637 (ALT 250 FOR IP): Performed by: STUDENT IN AN ORGANIZED HEALTH CARE EDUCATION/TRAINING PROGRAM

## 2022-09-22 PROCEDURE — 6370000000 HC RX 637 (ALT 250 FOR IP): Performed by: FAMILY MEDICINE

## 2022-09-22 PROCEDURE — 99222 1ST HOSP IP/OBS MODERATE 55: CPT

## 2022-09-22 PROCEDURE — 6370000000 HC RX 637 (ALT 250 FOR IP): Performed by: INTERNAL MEDICINE

## 2022-09-22 PROCEDURE — 82962 GLUCOSE BLOOD TEST: CPT

## 2022-09-22 PROCEDURE — 6360000002 HC RX W HCPCS: Performed by: FAMILY MEDICINE

## 2022-09-22 PROCEDURE — 85025 COMPLETE CBC W/AUTO DIFF WBC: CPT

## 2022-09-22 PROCEDURE — 80048 BASIC METABOLIC PNL TOTAL CA: CPT

## 2022-09-22 PROCEDURE — 94640 AIRWAY INHALATION TREATMENT: CPT

## 2022-09-22 PROCEDURE — 36415 COLL VENOUS BLD VENIPUNCTURE: CPT

## 2022-09-22 PROCEDURE — 94660 CPAP INITIATION&MGMT: CPT

## 2022-09-22 PROCEDURE — 2140000000 HC CCU INTERMEDIATE R&B

## 2022-09-22 PROCEDURE — 2580000003 HC RX 258: Performed by: FAMILY MEDICINE

## 2022-09-22 RX ADMIN — DESMOPRESSIN ACETATE 200 MCG: 0.1 TABLET ORAL at 21:28

## 2022-09-22 RX ADMIN — Medication 10 ML: at 21:31

## 2022-09-22 RX ADMIN — ARFORMOTEROL TARTRATE 15 MCG: 15 SOLUTION RESPIRATORY (INHALATION) at 18:57

## 2022-09-22 RX ADMIN — ESCITALOPRAM OXALATE 10 MG: 10 TABLET ORAL at 09:34

## 2022-09-22 RX ADMIN — FAMOTIDINE 20 MG: 20 TABLET ORAL at 09:33

## 2022-09-22 RX ADMIN — BUDESONIDE 250 MCG: 0.25 SUSPENSION RESPIRATORY (INHALATION) at 18:34

## 2022-09-22 RX ADMIN — GUAIFENESIN 400 MG: 400 TABLET ORAL at 09:34

## 2022-09-22 RX ADMIN — DILTIAZEM HYDROCHLORIDE 30 MG: 30 TABLET, FILM COATED ORAL at 05:38

## 2022-09-22 RX ADMIN — FERROUS SULFATE TAB 325 MG (65 MG ELEMENTAL FE) 325 MG: 325 (65 FE) TAB at 16:43

## 2022-09-22 RX ADMIN — OXYCODONE AND ACETAMINOPHEN 2 TABLET: 5; 325 TABLET ORAL at 01:42

## 2022-09-22 RX ADMIN — Medication 5 ML: at 02:18

## 2022-09-22 RX ADMIN — METOPROLOL SUCCINATE 100 MG: 100 TABLET, FILM COATED, EXTENDED RELEASE ORAL at 09:34

## 2022-09-22 RX ADMIN — ENOXAPARIN SODIUM 40 MG: 100 INJECTION SUBCUTANEOUS at 09:34

## 2022-09-22 RX ADMIN — ESCITALOPRAM OXALATE 10 MG: 10 TABLET ORAL at 21:28

## 2022-09-22 RX ADMIN — OXYCODONE AND ACETAMINOPHEN 2 TABLET: 5; 325 TABLET ORAL at 09:33

## 2022-09-22 RX ADMIN — PETROLATUM: 420 OINTMENT TOPICAL at 21:31

## 2022-09-22 RX ADMIN — PREDNISONE 5 MG: 5 TABLET ORAL at 09:34

## 2022-09-22 RX ADMIN — DILTIAZEM HYDROCHLORIDE 30 MG: 30 TABLET, FILM COATED ORAL at 13:02

## 2022-09-22 RX ADMIN — BUDESONIDE 250 MCG: 0.25 SUSPENSION RESPIRATORY (INHALATION) at 08:20

## 2022-09-22 RX ADMIN — DILTIAZEM HYDROCHLORIDE 30 MG: 30 TABLET, FILM COATED ORAL at 21:28

## 2022-09-22 RX ADMIN — GUAIFENESIN 400 MG: 400 TABLET ORAL at 21:27

## 2022-09-22 RX ADMIN — FERROUS SULFATE TAB 325 MG (65 MG ELEMENTAL FE) 325 MG: 325 (65 FE) TAB at 09:36

## 2022-09-22 RX ADMIN — ARFORMOTEROL TARTRATE 15 MCG: 15 SOLUTION RESPIRATORY (INHALATION) at 08:20

## 2022-09-22 RX ADMIN — Medication 5 ML: at 21:25

## 2022-09-22 RX ADMIN — Medication 10 ML: at 09:41

## 2022-09-22 RX ADMIN — LEVOTHYROXINE SODIUM 75 MCG: 0.07 TABLET ORAL at 05:37

## 2022-09-22 RX ADMIN — OXYCODONE AND ACETAMINOPHEN 2 TABLET: 5; 325 TABLET ORAL at 21:27

## 2022-09-22 RX ADMIN — HYDROCORTISONE: 25 CREAM TOPICAL at 21:31

## 2022-09-22 ASSESSMENT — PAIN - FUNCTIONAL ASSESSMENT
PAIN_FUNCTIONAL_ASSESSMENT: ACTIVITIES ARE NOT PREVENTED
PAIN_FUNCTIONAL_ASSESSMENT: ACTIVITIES ARE NOT PREVENTED

## 2022-09-22 ASSESSMENT — PAIN SCALES - GENERAL
PAINLEVEL_OUTOF10: 2
PAINLEVEL_OUTOF10: 8
PAINLEVEL_OUTOF10: 8
PAINLEVEL_OUTOF10: 4
PAINLEVEL_OUTOF10: 10

## 2022-09-22 ASSESSMENT — PAIN DESCRIPTION - PAIN TYPE: TYPE: ACUTE PAIN

## 2022-09-22 ASSESSMENT — PAIN DESCRIPTION - LOCATION
LOCATION: BACK;SHOULDER
LOCATION: BACK;SHOULDER

## 2022-09-22 ASSESSMENT — PAIN DESCRIPTION - FREQUENCY: FREQUENCY: CONTINUOUS

## 2022-09-22 ASSESSMENT — PAIN DESCRIPTION - DESCRIPTORS
DESCRIPTORS: ACHING;CRAMPING;DISCOMFORT
DESCRIPTORS: ACHING;CRAMPING;DISCOMFORT

## 2022-09-22 ASSESSMENT — PAIN DESCRIPTION - ORIENTATION
ORIENTATION: RIGHT;LEFT
ORIENTATION: MID;RIGHT;LEFT

## 2022-09-22 ASSESSMENT — PAIN DESCRIPTION - ONSET: ONSET: ON-GOING

## 2022-09-22 NOTE — PROGRESS NOTES
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, on DDAVP,   Secondary adrenal insufficiency, 2/2 sarcoidosis induced hypopituitarism, on prednisone   Acidemia (pH 7.229), with respiratory acidosis and metabolic acidosis secondary to diarrhea  HTN,   HFpEF 60%,   Pulmonary HTN  PAF,   Hypothyroidism, probably secondary  Anemia, normocytic        Temp (24hrs), Av.9 °F (36.6 °C), Min:97.8 °F (36.6 °C), Max:98 °F (36.7 °C)    DIET: ADULT DIET; Regular; Low Sodium (2 gm)  ADULT ORAL NUTRITION SUPPLEMENT; Breakfast, Dinner; Wound Healing Oral Supplement  ADULT ORAL NUTRITION SUPPLEMENT; Lunch; Low Calorie/High Protein Oral Supplement  CODE: Full Code        OBJECTIVE:    BP (!) 143/98   Pulse 92   Temp 98 °F (36.7 °C) (Oral)   Resp 16   Ht 5' (1.524 m)   Wt 191 lb 6.4 oz (86.8 kg)   LMP  (LMP Unknown)   SpO2 94%   BMI 37.38 kg/m²     General appearance: Not jaundiced not diaphoretic  HEENT: No obvious swelling to lips or tongue no thrush  Respiratory: Clear to auscultation bilaterally, unlabored respiratory pattern at rest  Cardiovascular:  Audible S1-S2 no lower extremity edema  Abdomen: abd dressing in place  left side  Musculoskeletal: adeq capillar refill  Neurologic: awake, alert speech is clear  ASSESSMENT:/PLAN:    For resp failure/ wean as oxygen  AVAPS HS and PRN contin bronchodilators  Contin prednisone  Lasix om holdfor now  On diltiazem and metoprolol  DDAVP contin          DISPOSITION: still requires hospit level care not stable for discharge    Medications:  REVIEWED DAILY    Infusion Medications    dextrose      sodium chloride Scheduled Medications    hydrocortisone   Rectal BID    desmopressin  200 mcg Oral Nightly    [Held by provider] furosemide  40 mg IntraVENous BID    guaiFENesin  400 mg Oral TID    dilTIAZem  30 mg Oral TID    escitalopram  10 mg Oral BID    famotidine  20 mg Oral Daily    ferrous sulfate  325 mg Oral BID WC    levothyroxine  75 mcg Oral QAM AC    metoprolol succinate  100 mg Oral Daily    predniSONE  5 mg Oral Daily    budesonide  0.25 mg Nebulization BID    And    Arformoterol Tartrate  15 mcg Nebulization BID    enoxaparin  40 mg SubCUTAneous Daily    sodium chloride flush  10 mL IntraVENous 2 times per day     PRN Meds: perflutren lipid microspheres, white petrolatum, bismuth subsalicylate, potassium chloride **OR** potassium alternative oral replacement **OR** potassium chloride, glucose, dextrose bolus **OR** dextrose bolus, glucagon (rDNA), dextrose, promethazine-codeine, loperamide, iopamidol, oxyCODONE-acetaminophen, albuterol, sodium chloride flush, sodium chloride, [DISCONTINUED] promethazine **OR** ondansetron, polyethylene glycol, acetaminophen **OR** acetaminophen    Labs:     Recent Labs     09/20/22  0628 09/21/22  0723 09/22/22  0513   WBC 4.6 3.8* 4.5   HGB 10.1* 9.0* 9.4*   HCT 33.1* 30.3* 31.7*    244 234       Recent Labs     09/20/22  1659 09/21/22  0723 09/22/22  0513    136 140   K 4.0 4.1 4.1   CL 96* 98 99   CO2 26 27 27   BUN 22 20 17   CREATININE 1.5* 1.1* 1.0   CALCIUM 8.8 8.7 8.8         Chronic labs:        Radiology:     +++++++++++++++++++++++++++++++++++++++++++++++++  Kimberlee Mayes DO  [place] [address], [place]  +++++++++++++++++++++++++++++++++++++++++++++++++  NOTE: This report was transcribed using voice recognition software. Every effort was made to ensure accuracy; however, inadvertent computerized transcription errors may be present.

## 2022-09-22 NOTE — ACP (ADVANCE CARE PLANNING)
Advance Care Planning     Advance Care Planning Activator (Inpatient)  Conversation Note      Date of ACP Conversation: 9/22/2022     Conversation Conducted with: Patient with Decision Making Capacity    ACP Activator: Karime Gore:     Current Designated Health Care Decision Maker:     Primary Decision Maker: [address] - [phone]    Today we documented Decision Maker(s) consistent with Legal Next of Kin hierarchy. Care Preferences    Ventilation: \"If you were in your present state of health and suddenly became very ill and were unable to breathe on your own, what would your preference be about the use of a ventilator (breathing machine) if it were available to you? \"      Would the patient desire the use of ventilator (breathing machine)?: yes    \"If your health worsens and it becomes clear that your chance of recovery is unlikely, what would your preference be about the use of a ventilator (breathing machine) if it were available to you? \"     Would the patient desire the use of ventilator (breathing machine)?: Yes patient deferred to spouse to make this call, should this happen      Resuscitation  \"CPR works best to restart the heart when there is a sudden event, like a heart attack, in someone who is otherwise healthy. Unfortunately, CPR does not typically restart the heart for people who have serious health conditions or who are very sick. \"    \"In the event your heart stopped as a result of an underlying serious health condition, would you want attempts to be made to restart your heart (answer \"yes\" for attempt to resuscitate) or would you prefer a natural death (answer \"no\" for do not attempt to resuscitate)? \" yes       [] Yes   [x] No   Educated Patient / Maame Morales regarding differences between Advance Directives and portable DNR orders.     Length of ACP Conversation in minutes:  10    Conversation Outcomes:  [x] ACP discussion completed  [] Existing advance directive reviewed with patient; no changes to patient's previously recorded wishes  [] New Advance Directive completed  [] Portable Do Not Rescitate prepared for Provider review and signature  [] POLST/POST/MOLST/MOST prepared for Provider review and signature      Follow-up plan:    [] Schedule follow-up conversation to continue planning  [x] Referred individual to Provider for additional questions/concerns   [] Advised patient/agent/surrogate to review completed ACP document and update if needed with changes in condition, patient preferences or care setting    [] This note routed to one or more involved healthcare providers    ILANA Dawkins, LSW [phone]

## 2022-09-22 NOTE — PLAN OF CARE
Patient's chart updated to reflect:      . - HF care plan, HF education points and HF discharge instructions.  -Orders: 2 gram sodium diet, daily weights, I/O.  -PCP and/or Cardiologist appointment to be scheduled within 7 days of hospital discharge.  -History of HF, not primary admission Dx.   Patient admitted for treatment of ASSESSMENT:  -Acute renal failure  -Urinary tract infection  -Bright red blood per rectum  -History of atrial fibrillation  -COPD    Salina Diana MSN, RN  Heart Failure Navigator

## 2022-09-22 NOTE — PROGRESS NOTES
Physical Therapy    PT order received and medical chart reviewed 9/22. PT role explained. Pt declined in the AM stating \"I want to eat my lunch first. Please come back after\". When PT returned in the PM pt again declined stating \"We're gonna have to do this later. I don't want to right now\". PT role was reinforced and PT explained that CM/SW needs note for discharge STAT. Pt continued to decline stating \"come back tomorrow\". Will re-attempt as able. Thank you.     Tatum Almaraz, PT, DPT  [id number]

## 2022-09-22 NOTE — CONSULTS
Code  Advanced Directives: no POA or living will in epic  Surrogate/Legal NOK:  [address] [facility] (Spouse) [phone]    Spiritual assessment: no spiritual distress identified  Bereavement and grief: to be determined  Referrals to: none today    Thank you for the opportunity to participate in the care of Alexandr Prieto. FRIDA Marsh CNP  Palliative Medicine     SUBJECTIVE:     Details of Conversation:    Chart was reviewed. Saw patient at the bedside. No family members were present. Patient was alert and oriented and she was able to participate in a meaningful conversation. Throughout this conversation patient remains stoic, and guarded, but was able to participate with the conversation, however she was noted to receptive of talking about CODE STATUS and goal of care. We first addressed goal of care, she wishes to return home, she wears 4 L nasal cannula at home, she reports of wanting to be more compliant, however the BiPAP mask is to be causing her ears to hurt, in that is the reason why she does not want to wear it at night. Her goal is to return home to her . She reports also having a living will and healthcare power of , she identified her  as her POA. No living will or POA documents noted to be in [facility] [address] [facility]. Patient reports that her  knows her wishes. We discussed CODE STATUS, she reports of wanting to remain a full code, did not want to discussed other CODE STATUS available. Comfort support was provided we will continue to follow.       Prognosis: Guarded    OBJECTIVE:     BP (!) 148/102   Pulse 77   Temp 97.9 °F (36.6 °C) (Oral)   Resp 16   Ht 5' (1.524 m)   Wt 191 lb 6.4 oz (86.8 kg)   LMP  (LMP Unknown)   SpO2 95%   BMI 37.38 kg/m²     Physical Examination:  Gen: elderly, thin, NAD, awake, alert, stoic  HEENT: normocephalic, atraumatic, PERRL, EOMI,   Neck: trachea midline, no JVD  Lungs: respirations easy and not labored,   Heart: regular rate and rhythm, distant heart tones,   Abdomen: normoactive bowel sounds, soft, non-tender  Extremities: edema, moving all extremities    Skin: warm, dry without rashes, lesions, bruising  Neuro: awake, alert, oriented x 3, follows commands, no gross neurologic deficit    Objective data reviewed: labs, images, records, medication use, vitals, and chart    Time/Communication  Greater than 50% of time spent, total 50 minutes in counseling and coordination of care at the bedside regarding  CODE STATUS discussion and goals of care. Thank you for allowing Palliative Medicine to participate in the care of Jose Carlos Montilla. Note: This report was completed using computer[employer]mix Media voiced recognition software. Every effort has been made to ensure accuracy; however, inadvertent computerized transcription errors may be present.

## 2022-09-22 NOTE — PLAN OF CARE
Problem: Pain  Goal: Verbalizes/displays adequate comfort level or baseline comfort level  Outcome: Progressing  Flowsheets (Taken 9/21/2022 2049)  Verbalizes/displays adequate comfort level or baseline comfort level: Encourage patient to monitor pain and request assistance     Problem: Safety - Adult  Goal: Free from fall injury  Outcome: Progressing     Problem: Cardiovascular - Adult  Goal: Maintains optimal cardiac output and hemodynamic stability  Outcome: Progressing  Flowsheets (Taken 9/21/2022 2049)  Maintains optimal cardiac output and hemodynamic stability: Monitor blood pressure and heart rate     Problem: Metabolic/Fluid and Electrolytes - Adult  Goal: Hemodynamic stability and optimal renal function maintained  Outcome: Progressing     Problem: Chronic Conditions and Co-morbidities  Goal: Patient's chronic conditions and co-morbidity symptoms are monitored and maintained or improved  Outcome: Progressing     Problem: ABCDS Injury Assessment  Goal: Absence of physical injury  Outcome: Progressing     Problem: Nutrition Deficit:  Goal: Optimize nutritional status  Outcome: Progressing     Problem: Skin/Tissue Integrity  Goal: Absence of new skin breakdown  Description: 1. Monitor for areas of redness and/or skin breakdown  2. Assess vascular access sites hourly  3. Every 4-6 hours minimum:  Change oxygen saturation probe site  4. Every 4-6 hours:  If on nasal continuous positive airway pressure, respiratory therapy assess nares and determine need for appliance change or resting period.   Outcome: Progressing     Problem: Discharge Planning  Goal: Discharge to home or other facility with appropriate resources  Outcome: Progressing

## 2022-09-22 NOTE — PROGRESS NOTES
and tongue normal. Supple, symmetrical, trachea midline, no adenopathy;thyroid: no enlargement/tenderness/nodules or JVD. Lung: Breath sounds rhonchi. Respirations unlabored. Symmetrical expansion. Heart: RRR, normal S1, S2. No MRG  Abdomen: Soft, NT, ND. BS present x 4 quadrants. No bruit or organomegaly. Extremities: Pedal pulses 2+ symmetric b/l. Extremities normal, no cyanosis, clubbing, or edema. Musculokeletal: No joint swelling, no muscle tenderness. ROM normal in all joints of extremities. Neurologic: Mental status: Alert    Pertinent/ New Labs and Imaging Studies     Imaging Personally Reviewed:  CXR 9/20  No acute process. Stable cardiomegaly. Echo 7/16/22   Ejection fraction is visually estimated at 60%. No regional wall motion abnormalities seen. Moderate left ventricular concentric hypertrophy noted. Dilated right ventricle with reduced function. Left atrial volume index of 34 ml per meters squared BSA. Moderate mitral regurgitation is present. Moderate tricuspid regurgitation. Pulmonary hypertension is severe. RVSP is 70 mmHg.    No evidence for hemodynamically significant pericardial effusion      Labs:  Lab Results   Component Value Date/Time    WBC 4.5 09/22/2022 05:13 AM    HGB 9.4 09/22/2022 05:13 AM    HCT 31.7 09/22/2022 05:13 AM    MCV 84.5 09/22/2022 05:13 AM    MCH 25.1 09/22/2022 05:13 AM    MCHC 29.7 09/22/2022 05:13 AM    RDW 16.2 09/22/2022 05:13 AM     09/22/2022 05:13 AM    MPV 10.2 09/22/2022 05:13 AM     Lab Results   Component Value Date/Time     09/22/2022 05:13 AM    K 4.1 09/22/2022 05:13 AM    K 4.1 09/19/2022 04:53 AM    CL 99 09/22/2022 05:13 AM    CO2 27 09/22/2022 05:13 AM    BUN 17 09/22/2022 05:13 AM    CREATININE 1.0 09/22/2022 05:13 AM    LABALBU 2.9 09/13/2022 06:05 AM    LABALBU 3.6 05/02/2012 07:40 PM    CALCIUM 8.8 09/22/2022 05:13 AM    GFRAA >60 09/22/2022 05:13 AM    LABGLOM >60 09/22/2022 05:13 AM     Lab Results Component Value Date/Time    PROTIME 11.0 03/11/2022 11:29 AM    PROTIME 12.7 05/02/2012 07:40 PM    INR 1.0 03/11/2022 11:29 AM     No results for input(s): PROBNP in the last 72 hours. No results for input(s): PROCAL in the last 72 hours. This SmartLink has not been configured with any valid records. Abg 9/16/22  7.22/72/330/29/0.6/99    Micro:  Nothing new      Assessment:    Acute on chronic respiratory failure with hypoxia and hypercapnia  GI bleed, Anemia  Pulmonary sarcoidosis  Restrictive lung disease  Obesity hypoventilation syndrome  LINDSAY  Chronic home O2 dependence 4 liters NC  Severe pulmonary hypertension WHO group 2, 3-chronic  Stage III severe COPD by Gold classification  Acute on chronic congestive heart failure with preserved EF  Moderate MR, Moderate TR  Chronic dyspnea  [place]'s disease  Diabetes mellitus II  Stage III chronic kidney disease  History of nicotine dependence  PAF  Obesity, BMI 35  Debility       Plan:   Wean oxygen as tolerated keep SpO2 between 88-92%, on 2L NC.    AVAPS HS and PRN- patient adamantly refusing  Bronchodilators: Brovana/Pulmicort twice daily, albuterol as needed  IRMA management per nephrology  Chronic prednisone 5 mg daily  GI/DVT prophylaxis   PT, OT   Appreciate Palliative input. Patient wishes to remain FULL CODE even though she continues to refuse treatment  Case management for placement. No further pulmonary interventions to offer as patient continues to be noncompliant with therapy       This plan of care was reviewed in collaboration with Dr. Melissa Corona  Electronically signed by FRIDA Guardado - CNP on 9/22/2022 at 2:53 PM      I personally saw, examined and provided care for the patient. Radiographs, labs and medication list were reviewed by me independently. I spoke with bedside nursing, therapists and consultants. The case was discussed in detail and plans for care were established.  Review of CNP documentation was conducted and revisions were made as appropriate. I agree with the above documented exam, problem list and plan of care.    Segun Lira MD

## 2022-09-22 NOTE — PROGRESS NOTES
Comprehensive Nutrition Assessment    Type and Reason for Visit:  Reassess    Nutrition Recommendations/Plan:   Continue Diet. Will Modify Current ONS and monitor. Malnutrition Assessment:  Malnutrition Status: At risk for malnutrition (Comment) (09/16/22 1330)    Context:  Acute Illness     Findings of the 6 clinical characteristics of malnutrition:  Energy Intake:  Mild decrease in energy intake (Comment) (since adm)  Weight Loss:  Unable to assess (2/2 fluid shifts)     Body Fat Loss:  No significant body fat loss     Muscle Mass Loss:  No significant muscle mass loss    Fluid Accumulation:  Unable to assess (22 CHF)     Strength:  Not Performed    Nutrition Assessment:    Pt adm w/ chronic abd pain, however worsening x past ~2d pta w/ noted BRBPR/diarrhea. PMHx CHF, CKD, COPD, MDD, chronic hernia/non-healing surgical wound. Adm w/ UTI/IRMA/GIB and +ongoing diarrhea (improving per notes). Noted RRT d/t AMS/hypoglycemia 9/16. Pt remains at nutritional risk d/t increased needs for wound healing w/ noted sporadic decreased rafita/intake 2/2 SOB/BiPAP use. Will Modify ONS and monitor. Nutrition Related Findings:    A&O, U/L dentures, Abd/BS WDL, +1 edema, +I/O's Wound Type: Surgical Incision       Current Nutrition Intake & Therapies:    Average Meal Intake: 51-75% (sporadic at times)  Average Supplements Intake: 0%  ADULT DIET; Regular; Low Sodium (2 gm)  ADULT ORAL NUTRITION SUPPLEMENT; Breakfast, Dinner; Other Oral Supplement; [id number] High Protein Jello    Anthropometric Measures:  Height: 5' (152.4 cm)  Ideal Body Weight (IBW): 100 lbs (45 kg)    Admission Body Weight: 176 lb (79.8 kg) (bed 9/14)  Current Body Weight: 191 lb (86.6 kg) (bed 9/22),   IBW.  Weight Source: Bed Scale  Current BMI (kg/m2): 37.3  Usual Body Weight: 182 lb (82.6 kg) (bed 7/11/22 per EMR; BAYRON wt change properly d/t possible fluid shifts w/ CHF/CKD hx)  % Weight Change (Calculated): -0.5                    BMI Categories: Obese Class 2 (BMI 35.0 -39.9)    Estimated Daily Nutrient Needs:  Energy Requirements Based On: Formula  Weight Used for Energy Requirements: Admission  Energy (kcal/day):   Weight Used for Protein Requirements: Ideal  Protein (g/day): 1.3-1.5gm/kg IBW= 60-70; as tolerated w/ IRMA/CKD and increased needs  Method Used for Fluid Requirements: 1 ml/kcal  Fluid (ml/day):     Nutrition Diagnosis:   Increased nutrient needs related to increase demand for energy/nutrients as evidenced by wounds    Nutrition Interventions:   Food and/or Nutrient Delivery: Continue Current Diet, Modify Oral Nutrition Supplement (Continue Diet. Will Modify Current ONS and monitor.)  Nutrition Education/Counseling: Education not indicated  Coordination of Nutrition Care: Continue to monitor while inpatient       Goals:  Previous Goal Met: Progressing toward Goal(s)  Goals: PO intake 75% or greater       Nutrition Monitoring and Evaluation:   Behavioral-Environmental Outcomes: None Identified  Food/Nutrient Intake Outcomes: Food and Nutrient Intake, Supplement Intake  Physical Signs/Symptoms Outcomes: Biochemical Data, Chewing or Swallowing, Diarrhea, GI Status, Fluid Status or Edema, Nutrition Focused Physical Findings, Skin, Weight    Discharge Planning:     Too soon to determine     Jeanine Rivera RD, LD  Contact: ext [phone]

## 2022-09-22 NOTE — PROGRESS NOTES
Department of Internal Medicine  Nephrology Progress Note    Events reviewed. SUBJECTIVE:  We are following Mrs. Lois Coulter for IRMA. She denies any complaints.      PHYSICAL EXAM:      Vitals:    VITALS:  BP (!) 148/102   Pulse 77   Temp 97.9 °F (36.6 °C) (Oral)   Resp 16   Ht 5' (1.524 m)   Wt 191 lb 6.4 oz (86.8 kg)   LMP  (LMP Unknown)   SpO2 95%   BMI 37.38 kg/m²   24HR INTAKE/OUTPUT:    Intake/Output Summary (Last 24 hours) at 9/22/2022 1346  Last data filed at 9/22/2022 1114  Gross per 24 hour   Intake 480 ml   Output 550 ml   Net -70 ml       Constitutional: Alert and oriented  HEENT:  PERRLA  Respiratory:  CTA  Cardiovascular/Edema:  Normal S1/S2, RRR, no murmur, no edema  Gastrointestinal:  Soft, non-distended  Neurologic:  non-focal  Skin:  No rashes, lesions     Scheduled Meds:   hydrocortisone   Rectal BID    desmopressin  200 mcg Oral Nightly    [Held by provider] furosemide  40 mg IntraVENous BID    guaiFENesin  400 mg Oral TID    dilTIAZem  30 mg Oral TID    escitalopram  10 mg Oral BID    famotidine  20 mg Oral Daily    ferrous sulfate  325 mg Oral BID WC    levothyroxine  75 mcg Oral QAM AC    metoprolol succinate  100 mg Oral Daily    predniSONE  5 mg Oral Daily    budesonide  0.25 mg Nebulization BID    And    Arformoterol Tartrate  15 mcg Nebulization BID    enoxaparin  40 mg SubCUTAneous Daily    sodium chloride flush  10 mL IntraVENous 2 times per day     Continuous Infusions:   dextrose      sodium chloride       PRN Meds:.perflutren lipid microspheres, white petrolatum, bismuth subsalicylate, potassium chloride **OR** potassium alternative oral replacement **OR** potassium chloride, glucose, dextrose bolus **OR** dextrose bolus, glucagon (rDNA), dextrose, promethazine-codeine, loperamide, iopamidol, oxyCODONE-acetaminophen, albuterol, sodium chloride flush, sodium chloride, [DISCONTINUED] promethazine **OR** ondansetron, polyethylene glycol, acetaminophen **OR** acetaminophen    DATA:    CBC with Differential:    Lab Results   Component Value Date/Time    WBC 4.5 09/22/2022 05:13 AM    RBC 3.75 09/22/2022 05:13 AM    RBC 3.57 12/14/2021 09:57 AM    HGB 9.4 09/22/2022 05:13 AM    HCT 31.7 09/22/2022 05:13 AM     09/22/2022 05:13 AM    MCV 84.5 09/22/2022 05:13 AM    MCH 25.1 09/22/2022 05:13 AM    MCHC 29.7 09/22/2022 05:13 AM    RDW 16.2 09/22/2022 05:13 AM    NRBC 0.9 09/21/2022 07:23 AM    SEGSPCT 77 01/15/2014 04:00 PM    BANDSPCT 1 06/25/2014 05:45 AM    METASPCT 0.9 09/21/2022 07:23 AM    LYMPHOPCT 13.4 09/22/2022 05:13 AM    LYMPHOPCT 14.5 12/14/2021 09:57 AM    PROMYELOPCT 0.9 09/10/2022 01:23 PM    MONOPCT 14.8 09/22/2022 05:13 AM    MYELOPCT 0.9 03/01/2021 04:39 PM    BASOPCT 0.4 09/22/2022 05:13 AM    MONOSABS 0.67 09/22/2022 05:13 AM    LYMPHSABS 0.61 09/22/2022 05:13 AM    EOSABS 0.23 09/22/2022 05:13 AM    BASOSABS 0.02 09/22/2022 05:13 AM     CMP:    Lab Results   Component Value Date/Time     09/22/2022 05:13 AM    K 4.1 09/22/2022 05:13 AM    K 4.1 09/19/2022 04:53 AM    CL 99 09/22/2022 05:13 AM    CO2 27 09/22/2022 05:13 AM    BUN 17 09/22/2022 05:13 AM    CREATININE 1.0 09/22/2022 05:13 AM    GFRAA >60 09/22/2022 05:13 AM    LABGLOM >60 09/22/2022 05:13 AM    GLUCOSE 88 09/22/2022 05:13 AM    GLUCOSE 116 05/02/2012 07:40 PM    PROT 5.4 09/13/2022 06:05 AM    LABALBU 2.9 09/13/2022 06:05 AM    LABALBU 3.6 05/02/2012 07:40 PM    CALCIUM 8.8 09/22/2022 05:13 AM    BILITOT 0.3 09/13/2022 06:05 AM    ALKPHOS 61 09/13/2022 06:05 AM    AST 17 09/13/2022 06:05 AM    ALT 8 09/13/2022 06:05 AM     Magnesium:    Lab Results   Component Value Date/Time    MG 2.4 09/16/2022 05:08 AM     Phosphorus:    Lab Results   Component Value Date/Time    PHOS 3.4 03/01/2021 04:39 PM     Radiology Review:        CT Abd/Pelvis 9/15/22   1. Gas fluid levels within the colon may reflect a diarrheal process. 2.  Postop changes in the abdomen as described.   No evidence of bowel   obstruction. No free air or free fluid. 3.  Stable lung base opacities and chronic interstitial lung disease. CXR 9/20/2022   No acute process. Stable cardiomegaly. BRIEF SUMMARY OF INITIAL CONSULT:    Mrs. Breanna Santos is a 73 y.o. 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 On September 12 2022, patient presented to the ED again for rectal bleeding. Labs were significant for chloride 94, bicarbonate 30, creatinine 2.0. Renal function improved with IVF administration, creatinine went down to 0.9 mg/dL on September 14. Since then his creatinine level has progressively increased up to 1.9 mg/dL, reason for this consultation. Review of her records showed that on September 14 she was started on furosemide and the following day on September 15 she had a CT abdomen and pelvis with IV contrast.    Problems resolved: IRMA stage II, likely volume responsive prerenal IRMA versus ATN 2/2 contrast induced IRMA in the setting of diuretic administration. Patient received IV contrast on 9/10/22 with recovery of renal function and received contrast again on 9/15/22. Renal function improved with IV fluid administration. Resolved.    Acute on chronic respiratory failure 2/2 sarcoidosis, on intermittent NIPPV   UTI, s/p ceftriaxone     IMPRESSION/RECOMMENDATIONS:      Central DI 2/2 sarcoidosis, on DDAVP, continue with only nightly dosing  Secondary adrenal insufficiency, 2/2 sarcoidosis induced hypopituitarism, on prednisone   Acidemia (pH 7.229), with respiratory acidosis and metabolic acidosis secondary to diarrhea  HTN, on metoprolol  HFpEF 60%, on metoprolol, Echo pending, Lasix on hold.  Pro-BNP 13,419 >>8,729  ---------------------------------------------------------------  Pulmonary HTN  PAF, on diltiazem and metoprolol  Hypothyroidism, probably secondary  Anemia, normocytic     Plan:    Continue prednisone 5 mg PO daily   Continue to hold Lasix for now  Continue DDAVP 200 mcg nightly  Strict I&Os  Continue to monitor renal function  Repeat pro-BNP    Case and plan discussed with Dr. Caro Whalen      Electronically signed by FRIDA Zimmer CNP on 9/22/2022 at 1:46 PM

## 2022-09-22 NOTE — CARE COORDINATION
Patient seen by OT yesterday and pending PT eval today. Call made to therapy department for patient to be seen stat, no answer; left detailed message. Catherine Hernandes, liaison at [place] at [facility] and provided referral; awaiting PT eval. Ambulance form completed, 7000 started and envelope placed on the soft chart. 11:10A  Met with patient at bedside to discuss discharge plan; patient alert and oriented. Discussed possible need for FRITZ; patient agreeable if it is recommended. Also discussed  chose facilities due to patient not being able to decide at the time; gave patient names of the facilities. Following PT eval, she would like to discuss dc plan further with her .     Jeffry Moya, MSW, LSW [phone]

## 2022-09-23 LAB
ANION GAP SERPL CALCULATED.3IONS-SCNC: 9 MMOL/L (ref 7–16)
ANISOCYTOSIS: ABNORMAL
BASOPHILS ABSOLUTE: 0 E9/L (ref 0–0.2)
BASOPHILS RELATIVE PERCENT: 0.4 % (ref 0–2)
BUN BLDV-MCNC: 12 MG/DL (ref 6–23)
BURR CELLS: ABNORMAL
CALCIUM SERPL-MCNC: 9.2 MG/DL (ref 8.6–10.2)
CHLORIDE BLD-SCNC: 99 MMOL/L (ref 98–107)
CO2: 29 MMOL/L (ref 22–29)
CREAT SERPL-MCNC: 0.9 MG/DL (ref 0.5–1)
EOSINOPHILS ABSOLUTE: 0.12 E9/L (ref 0.05–0.5)
EOSINOPHILS RELATIVE PERCENT: 2.6 % (ref 0–6)
GFR AFRICAN AMERICAN: >60
GFR NON-AFRICAN AMERICAN: >60 ML/MIN/1.73
GLUCOSE BLD-MCNC: 71 MG/DL (ref 74–99)
HCT VFR BLD CALC: 33.9 % (ref 34–48)
HEMOGLOBIN: 9.5 G/DL (ref 11.5–15.5)
LYMPHOCYTES ABSOLUTE: 0.38 E9/L (ref 1.5–4)
LYMPHOCYTES RELATIVE PERCENT: 7.9 % (ref 20–42)
MCH RBC QN AUTO: 24.9 PG (ref 26–35)
MCHC RBC AUTO-ENTMCNC: 28 % (ref 32–34.5)
MCV RBC AUTO: 89 FL (ref 80–99.9)
METER GLUCOSE: 76 MG/DL (ref 74–99)
METER GLUCOSE: 93 MG/DL (ref 74–99)
MONOCYTES ABSOLUTE: 0.28 E9/L (ref 0.1–0.95)
MONOCYTES RELATIVE PERCENT: 6.2 % (ref 2–12)
NEUTROPHILS ABSOLUTE: 3.9 E9/L (ref 1.8–7.3)
NEUTROPHILS RELATIVE PERCENT: 83.3 % (ref 43–80)
OVALOCYTES: ABNORMAL
PDW BLD-RTO: 16.9 FL (ref 11.5–15)
PLATELET # BLD: 205 E9/L (ref 130–450)
PMV BLD AUTO: 9.5 FL (ref 7–12)
POIKILOCYTES: ABNORMAL
POLYCHROMASIA: ABNORMAL
POTASSIUM SERPL-SCNC: 4.8 MMOL/L (ref 3.5–5)
PRO-BNP: 5184 PG/ML (ref 0–125)
RBC # BLD: 3.81 E12/L (ref 3.5–5.5)
SODIUM BLD-SCNC: 137 MMOL/L (ref 132–146)
TARGET CELLS: ABNORMAL
WBC # BLD: 4.7 E9/L (ref 4.5–11.5)

## 2022-09-23 PROCEDURE — 2140000000 HC CCU INTERMEDIATE R&B

## 2022-09-23 PROCEDURE — 6370000000 HC RX 637 (ALT 250 FOR IP): Performed by: INTERNAL MEDICINE

## 2022-09-23 PROCEDURE — 83880 ASSAY OF NATRIURETIC PEPTIDE: CPT

## 2022-09-23 PROCEDURE — 80048 BASIC METABOLIC PNL TOTAL CA: CPT

## 2022-09-23 PROCEDURE — 97530 THERAPEUTIC ACTIVITIES: CPT

## 2022-09-23 PROCEDURE — 36415 COLL VENOUS BLD VENIPUNCTURE: CPT

## 2022-09-23 PROCEDURE — 97535 SELF CARE MNGMENT TRAINING: CPT

## 2022-09-23 PROCEDURE — 6370000000 HC RX 637 (ALT 250 FOR IP): Performed by: FAMILY MEDICINE

## 2022-09-23 PROCEDURE — 94660 CPAP INITIATION&MGMT: CPT

## 2022-09-23 PROCEDURE — 97161 PT EVAL LOW COMPLEX 20 MIN: CPT

## 2022-09-23 PROCEDURE — 94640 AIRWAY INHALATION TREATMENT: CPT

## 2022-09-23 PROCEDURE — 6370000000 HC RX 637 (ALT 250 FOR IP): Performed by: STUDENT IN AN ORGANIZED HEALTH CARE EDUCATION/TRAINING PROGRAM

## 2022-09-23 PROCEDURE — 82962 GLUCOSE BLOOD TEST: CPT

## 2022-09-23 PROCEDURE — 2700000000 HC OXYGEN THERAPY PER DAY

## 2022-09-23 PROCEDURE — 6360000002 HC RX W HCPCS: Performed by: FAMILY MEDICINE

## 2022-09-23 PROCEDURE — 85025 COMPLETE CBC W/AUTO DIFF WBC: CPT

## 2022-09-23 PROCEDURE — 6370000000 HC RX 637 (ALT 250 FOR IP)

## 2022-09-23 PROCEDURE — 2580000003 HC RX 258: Performed by: FAMILY MEDICINE

## 2022-09-23 RX ADMIN — GUAIFENESIN 400 MG: 400 TABLET ORAL at 21:46

## 2022-09-23 RX ADMIN — DILTIAZEM HYDROCHLORIDE 30 MG: 30 TABLET, FILM COATED ORAL at 05:00

## 2022-09-23 RX ADMIN — FERROUS SULFATE TAB 325 MG (65 MG ELEMENTAL FE) 325 MG: 325 (65 FE) TAB at 08:41

## 2022-09-23 RX ADMIN — FERROUS SULFATE TAB 325 MG (65 MG ELEMENTAL FE) 325 MG: 325 (65 FE) TAB at 18:36

## 2022-09-23 RX ADMIN — ARFORMOTEROL TARTRATE 15 MCG: 15 SOLUTION RESPIRATORY (INHALATION) at 18:34

## 2022-09-23 RX ADMIN — FAMOTIDINE 20 MG: 20 TABLET ORAL at 08:41

## 2022-09-23 RX ADMIN — METOPROLOL SUCCINATE 100 MG: 100 TABLET, FILM COATED, EXTENDED RELEASE ORAL at 08:41

## 2022-09-23 RX ADMIN — GUAIFENESIN 400 MG: 400 TABLET ORAL at 08:41

## 2022-09-23 RX ADMIN — DILTIAZEM HYDROCHLORIDE 30 MG: 30 TABLET, FILM COATED ORAL at 21:46

## 2022-09-23 RX ADMIN — Medication 10 ML: at 21:00

## 2022-09-23 RX ADMIN — Medication 5 ML: at 21:55

## 2022-09-23 RX ADMIN — PREDNISONE 5 MG: 5 TABLET ORAL at 08:41

## 2022-09-23 RX ADMIN — BUDESONIDE 250 MCG: 0.25 SUSPENSION RESPIRATORY (INHALATION) at 18:34

## 2022-09-23 RX ADMIN — ARFORMOTEROL TARTRATE 15 MCG: 15 SOLUTION RESPIRATORY (INHALATION) at 08:29

## 2022-09-23 RX ADMIN — HYDROCORTISONE: 25 CREAM TOPICAL at 08:40

## 2022-09-23 RX ADMIN — LEVOTHYROXINE SODIUM 75 MCG: 0.07 TABLET ORAL at 05:00

## 2022-09-23 RX ADMIN — ENOXAPARIN SODIUM 40 MG: 100 INJECTION SUBCUTANEOUS at 08:40

## 2022-09-23 RX ADMIN — ESCITALOPRAM OXALATE 10 MG: 10 TABLET ORAL at 08:41

## 2022-09-23 RX ADMIN — DILTIAZEM HYDROCHLORIDE 30 MG: 30 TABLET, FILM COATED ORAL at 12:08

## 2022-09-23 RX ADMIN — BUDESONIDE 250 MCG: 0.25 SUSPENSION RESPIRATORY (INHALATION) at 08:30

## 2022-09-23 RX ADMIN — DESMOPRESSIN ACETATE 200 MCG: 0.1 TABLET ORAL at 21:46

## 2022-09-23 RX ADMIN — HYDROCORTISONE: 25 CREAM TOPICAL at 21:00

## 2022-09-23 RX ADMIN — Medication 10 ML: at 08:42

## 2022-09-23 RX ADMIN — OXYCODONE AND ACETAMINOPHEN 2 TABLET: 5; 325 TABLET ORAL at 21:55

## 2022-09-23 RX ADMIN — ESCITALOPRAM OXALATE 10 MG: 10 TABLET ORAL at 21:46

## 2022-09-23 RX ADMIN — OXYCODONE AND ACETAMINOPHEN 2 TABLET: 5; 325 TABLET ORAL at 15:53

## 2022-09-23 ASSESSMENT — PAIN - FUNCTIONAL ASSESSMENT: PAIN_FUNCTIONAL_ASSESSMENT: ACTIVITIES ARE NOT PREVENTED

## 2022-09-23 ASSESSMENT — PAIN SCALES - GENERAL
PAINLEVEL_OUTOF10: 7
PAINLEVEL_OUTOF10: 10
PAINLEVEL_OUTOF10: 2

## 2022-09-23 ASSESSMENT — PAIN DESCRIPTION - DESCRIPTORS: DESCRIPTORS: ACHING;DISCOMFORT;TENDER

## 2022-09-23 ASSESSMENT — PAIN DESCRIPTION - LOCATION
LOCATION: BACK;SHOULDER
LOCATION: SHOULDER;BACK

## 2022-09-23 ASSESSMENT — PAIN DESCRIPTION - ORIENTATION: ORIENTATION: MID;RIGHT;LEFT

## 2022-09-23 NOTE — CARE COORDINATION
Patient seen by PT/OT today. Spoke with Alfa Villanueva at [place] at [facility], she is able to accept, has initiated pre-cert and patient will need a negative covid test. Ambulance form completed, 7000 started and envelope placed on the soft chart. Patient informed and agreeable to FRITZ. Call made to patient's  to provide update, no answer; left a message to return the call. The Plan for Transition of Care is related to the following treatment goals: discharge planning    The Patient and/or patient representative Glena Patella was provided with a choice of provider and agrees with the discharge plan. [x] Yes [] No    Freedom of choice list was provided with basic dialogue that supports the patient's individualized plan of care/goals, treatment preferences and shares the quality data associated with the providers.  [x] Yes [] No     Mary Rubio, MSW, LSW [phone]

## 2022-09-23 NOTE — PROGRESS NOTES
Hospitalist Progress Note      SYNOPSIS: Patient admitted on 2022 for BRBPR (bright red blood per rectum)  Patient presented to the emergency department with abdominal pain and bright red blood per rectum       SUBJECTIVE:    Rectal bleeding, BRBPR- stabilized       per report has been non adherent with NIPPV     Despite resp symptoms  Central DI 2/2 sarcoidosis, on DDAVP,   Secondary adrenal insufficiency, 2/2 sarcoidosis induced hypopituitarism, on prednisone   Acidemia (pH 7.229), with respiratory acidosis and metabolic acidosis secondary to diarrhea  HTN,   HFpEF 60%,   Pulmonary HTN  PAF,   Hypothyroidism, probably secondary  Anemia, normocytic     Den cp den abd pain some mild low back discomfort     Temp (24hrs), Av.2 °F (36.8 °C), Min:97.7 °F (36.5 °C), Max:98.8 °F (37.1 °C)    DIET: ADULT DIET; Regular; Low Sodium (2 gm)  ADULT ORAL NUTRITION SUPPLEMENT; Breakfast, Dinner; Wound Healing Oral Supplement  ADULT ORAL NUTRITION SUPPLEMENT; Lunch; Low Calorie/High Protein Oral Supplement  CODE: Full Code        OBJECTIVE:    BP (!) 159/110   Pulse 81   Temp 98.2 °F (36.8 °C) (Oral)   Resp 16   Ht 5' (1.524 m)   Wt 184 lb 9.6 oz (83.7 kg)   LMP  (LMP Unknown)   SpO2 98%   BMI 36.05 kg/m²   General appearance: overweight body habitus not diaphoretic  HEENT: No obvious swelling to lips or tongue no thrush  Respiratory: Clear to auscultation bilaterally, unlabored respiratory pattern at rest  Cardiovascular:  Audible S1-S2 no lower extremity edema  Abdomen: abd dressing in place  left side  Musculoskeletal: adeq capillar refill  Neurologic: awake, alert speech is clear    ASSESSMENT:    BRBPR-hgb 9.5  hgb has been holding   Central DI 2/2 sarcoidosis, on DDAVP,   Secondary adrenal insufficiency, 2/2 sarcoidosis induced hypopituitarism, on prednisone   Acidemia (pH 7.229), with respiratory acidosis and metabolic acidosis secondary to diarrhea  HTN, essent bp in acceptable range  HFpEF 60%, Na 137/K 4.8/cr 0.9               Pulmonary HTN  PAF,   Hypothyroidism, probably secondary  Anemia, normocytic   Restric lung disease/pulm sarcoid  Mirza/obesity hypoventilat syndrome/patient has been nonadherent to nippv  COPD   Chrnc resp failure  Hypothyroid unspeciifed     PLAN:    Nephro recommends hold lasix at this time and contin prednisone 5 mg po qd   And to contin diltiaz 30mg po tid and metoprolol 100mg po qd  Per nephro contin DDAVP 200mcg nightly   Abx discontin as uti ruled out  Pulmonol recommends bronchodilators and AVAPS HS and prn as well as wean oxygen and NIV  proBNp in am    DISPOSITION: plans are for [place] [facility] [place]  Not stable for discharge today     Medications:  REVIEWED DAILY    Infusion Medications    dextrose      sodium chloride       Scheduled Medications    hydrocortisone   Rectal BID    desmopressin  200 mcg Oral Nightly    [Held by provider] furosemide  40 mg IntraVENous BID    guaiFENesin  400 mg Oral TID    dilTIAZem  30 mg Oral TID    escitalopram  10 mg Oral BID    famotidine  20 mg Oral Daily    ferrous sulfate  325 mg Oral BID WC    levothyroxine  75 mcg Oral QAM AC    metoprolol succinate  100 mg Oral Daily    predniSONE  5 mg Oral Daily    budesonide  0.25 mg Nebulization BID    And    Arformoterol Tartrate  15 mcg Nebulization BID    enoxaparin  40 mg SubCUTAneous Daily    sodium chloride flush  10 mL IntraVENous 2 times per day     PRN Meds: perflutren lipid microspheres, white petrolatum, bismuth subsalicylate, potassium chloride **OR** potassium alternative oral replacement **OR** potassium chloride, glucose, dextrose bolus **OR** dextrose bolus, glucagon (rDNA), dextrose, promethazine-codeine, loperamide, iopamidol, oxyCODONE-acetaminophen, albuterol, sodium chloride flush, sodium chloride, [DISCONTINUED] promethazine **OR** ondansetron, polyethylene glycol, acetaminophen **OR** acetaminophen    Labs:     Recent Labs     09/21/22  0723 09/22/22  5927 09/23/22  0521   WBC 3.8* 4.5 4.7   HGB 9.0* 9.4* 9.5*   HCT 30.3* 31.7* 33.9*    234 205       Recent Labs     09/21/22  0723 09/22/22  0513 09/23/22  0521    140 137   K 4.1 4.1 4.8   CL 98 99 99   CO2 27 27 29   BUN 20 17 12   CREATININE 1.1* 1.0 0.9   CALCIUM 8.7 8.8 9.2     Radiology:     +++++++++++++++++++++++++++++++++++++++++++++++++  Fay Vaughan DO  [address]  +++++++++++++++++++++++++++++++++++++++++++++++++  NOTE: This report was transcribed using voice recognition software. Every effort was made to ensure accuracy; however, inadvertent computerized transcription errors may be present.

## 2022-09-23 NOTE — PROGRESS NOTES
Department of Internal Medicine  Nephrology Progress Note    Events reviewed. SUBJECTIVE:  We are following Mrs. Green Book for IRMA. She denies any complaints.      PHYSICAL EXAM:      Vitals:    VITALS:  BP (!) 159/106   Pulse 70   Temp 97.7 °F (36.5 °C) (Oral)   Resp 18   Ht 5' (1.524 m)   Wt 184 lb 9.6 oz (83.7 kg)   LMP  (LMP Unknown)   SpO2 100%   BMI 36.05 kg/m²   24HR INTAKE/OUTPUT:    Intake/Output Summary (Last 24 hours) at 9/23/2022 1006  Last data filed at 9/23/2022 0445  Gross per 24 hour   Intake 600 ml   Output 1650 ml   Net -1050 ml         Constitutional: Alert and oriented  HEENT:  PERRLA  Respiratory:  CTA  Cardiovascular/Edema:  Normal S1/S2, RRR, no murmur, no edema  Gastrointestinal:  Soft, non-distended  Neurologic:  non-focal  Skin:  No rashes, lesions     Scheduled Meds:   hydrocortisone   Rectal BID    desmopressin  200 mcg Oral Nightly    [Held by provider] furosemide  40 mg IntraVENous BID    guaiFENesin  400 mg Oral TID    dilTIAZem  30 mg Oral TID    escitalopram  10 mg Oral BID    famotidine  20 mg Oral Daily    ferrous sulfate  325 mg Oral BID WC    levothyroxine  75 mcg Oral QAM AC    metoprolol succinate  100 mg Oral Daily    predniSONE  5 mg Oral Daily    budesonide  0.25 mg Nebulization BID    And    Arformoterol Tartrate  15 mcg Nebulization BID    enoxaparin  40 mg SubCUTAneous Daily    sodium chloride flush  10 mL IntraVENous 2 times per day     Continuous Infusions:   dextrose      sodium chloride       PRN Meds:.perflutren lipid microspheres, white petrolatum, bismuth subsalicylate, potassium chloride **OR** potassium alternative oral replacement **OR** potassium chloride, glucose, dextrose bolus **OR** dextrose bolus, glucagon (rDNA), dextrose, promethazine-codeine, loperamide, iopamidol, oxyCODONE-acetaminophen, albuterol, sodium chloride flush, sodium chloride, [DISCONTINUED] promethazine **OR** ondansetron, polyethylene glycol, acetaminophen **OR** acetaminophen    DATA:    CBC with Differential:    Lab Results   Component Value Date/Time    WBC 4.7 09/23/2022 05:21 AM    RBC 3.81 09/23/2022 05:21 AM    RBC 3.57 12/14/2021 09:57 AM    HGB 9.5 09/23/2022 05:21 AM    HCT 33.9 09/23/2022 05:21 AM     09/23/2022 05:21 AM    MCV 89.0 09/23/2022 05:21 AM    MCH 24.9 09/23/2022 05:21 AM    MCHC 28.0 09/23/2022 05:21 AM    RDW 16.9 09/23/2022 05:21 AM    NRBC 0.9 09/21/2022 07:23 AM    SEGSPCT 77 01/15/2014 04:00 PM    BANDSPCT 1 06/25/2014 05:45 AM    METASPCT 0.9 09/21/2022 07:23 AM    LYMPHOPCT 13.4 09/22/2022 05:13 AM    LYMPHOPCT 14.5 12/14/2021 09:57 AM    PROMYELOPCT 0.9 09/10/2022 01:23 PM    MONOPCT 14.8 09/22/2022 05:13 AM    MYELOPCT 0.9 03/01/2021 04:39 PM    BASOPCT 0.4 09/22/2022 05:13 AM    MONOSABS 0.67 09/22/2022 05:13 AM    LYMPHSABS 0.61 09/22/2022 05:13 AM    EOSABS 0.23 09/22/2022 05:13 AM    BASOSABS 0.02 09/22/2022 05:13 AM     CMP:    Lab Results   Component Value Date/Time     09/23/2022 05:21 AM    K 4.8 09/23/2022 05:21 AM    K 4.1 09/19/2022 04:53 AM    CL 99 09/23/2022 05:21 AM    CO2 29 09/23/2022 05:21 AM    BUN 12 09/23/2022 05:21 AM    CREATININE 0.9 09/23/2022 05:21 AM    GFRAA >60 09/23/2022 05:21 AM    LABGLOM >60 09/23/2022 05:21 AM    GLUCOSE 71 09/23/2022 05:21 AM    GLUCOSE 116 05/02/2012 07:40 PM    PROT 5.4 09/13/2022 06:05 AM    LABALBU 2.9 09/13/2022 06:05 AM    LABALBU 3.6 05/02/2012 07:40 PM    CALCIUM 9.2 09/23/2022 05:21 AM    BILITOT 0.3 09/13/2022 06:05 AM    ALKPHOS 61 09/13/2022 06:05 AM    AST 17 09/13/2022 06:05 AM    ALT 8 09/13/2022 06:05 AM     Magnesium:    Lab Results   Component Value Date/Time    MG 2.4 09/16/2022 05:08 AM     Phosphorus:    Lab Results   Component Value Date/Time    PHOS 3.4 03/01/2021 04:39 PM     Radiology Review:        CT Abd/Pelvis 9/15/22   1. Gas fluid levels within the colon may reflect a diarrheal process. 2.  Postop changes in the abdomen as described.   No evidence of bowel   obstruction. No free air or free fluid. 3.  Stable lung base opacities and chronic interstitial lung disease. CXR 9/20/2022   No acute process. Stable cardiomegaly. BRIEF SUMMARY OF INITIAL CONSULT:    Mrs. Cecily Muir is a 73 y.o. 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 On September 12 2022, patient presented to the ED again for rectal bleeding. Labs were significant for chloride 94, bicarbonate 30, creatinine 2.0. Renal function improved with IVF administration, creatinine went down to 0.9 mg/dL on September 14. Since then his creatinine level has progressively increased up to 1.9 mg/dL, reason for this consultation. Review of her records showed that on September 14 she was started on furosemide and the following day on September 15 she had a CT abdomen and pelvis with IV contrast.    Problems resolved: IRMA stage II, likely volume responsive prerenal IRMA versus ATN 2/2 contrast induced IRMA in the setting of diuretic administration. Patient received IV contrast on 9/10/22 with recovery of renal function and received contrast again on 9/15/22. Renal function improved with IV fluid administration. Resolved.    Acute on chronic respiratory failure 2/2 sarcoidosis, on intermittent NIPPV   UTI, s/p ceftriaxone   Acidemia (pH 7.229), with respiratory acidosis and metabolic acidosis secondary to diarrhea    IMPRESSION/RECOMMENDATIONS:      Central DI 2/2 sarcoidosis, on DDAVP, continue with only nightly dosing  Secondary adrenal insufficiency, 2/2 sarcoidosis induced hypopituitarism, on prednisone  HTN, on metoprolol  HFpEF 60%, on metoprolol, Echo pending, Lasix on hold.  Pro-BNP 13,419 >>8,729> 5184  ---------------------------------------------------------------  Pulmonary HTN  PAF, on diltiazem and metoprolol  Hypothyroidism, probably secondary  Anemia, normocytic     Plan:    Continue prednisone 5 mg PO daily   Continue diltiazem 30 mg p.o. 3 times daily  Continue metoprolol succinate 100 mg daily  Continue to hold Lasix for now  Continue DDAVP 200 mcg nightly  Continue to monitor renal function  Discharge planning      Electronically signed by Omid Condon MD on 9/23/2022 at 10:06 AM

## 2022-09-23 NOTE — PROGRESS NOTES
Occupational Therapy  OCCUPATIONAL THERAPY TREATMENT SESSION   [facility] [address] [address] [address]      Date:2022                                                Patient Name: Roberth Talavera  MRN: [id number]  : 1955  Room: [address] [place] [address] [address][id number]    Referring Provider: FRIDA Bullock CNP  Specific Provider Orders/Date: OT eval and treat 22    Diagnosis: UTI (urinary tract infection) [N39.0]   Pt admitted to hospital on 22 for rectal bleeding      Pertinent Medical History:  has a past medical history of A-fib ([address] Able to transfer from chair to wheelchair, Acute on chronic respiratory failure ([address] Anemia due to chronic illness, Ankle fracture, left, 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[address] Backache, Benign hypertension, CHF (congestive heart failure) ([address] Chronic back pain, Chronic kidney disease, Chronic pain disorder, Chronic, continuous use of opioids, Compression fracture of thoracic vertebra ([address] COPD, Debility, Deformity of ankle and foot, acquired, Depression, Diabetes insipidus ([address] Encephalopathy acute, Gait disturbance, GERD, Hernia, Clostridium difficile, Ischemic colitis, enteritis, or enterocolitis ([address] Long term (current) use of systemic steroids, Long-term current use of steroids, Lumbar disc herniation, Myalgia and myositis, unspecified, Nephrosclerosis, Nonunion of fracture, Osteoarthritis, PAF, 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[address] Pulmonary hypertension ([address] Pulmonary sarcoidosis ([address] Rectal bleeding, Rhabdomyolysis, Steroid-induced avascular necrosis of shoulder ([address] Tibia fracture, and Ventral hernia.        Precautions:  Fall Risk, ramirez, cognition, continuous pulse ox, O2, TAPS, bed alarm  Chronic L Charcot deformity, h/o R tibia fx resulting in varus deformity    Assessment of current deficits    [x] Functional mobility  [x]ADLs  [x] Strength [x]Cognition    [x] Functional transfers   [x] IADLs         [x] Safety Awareness   [x]Endurance    [] Fine Coordination              [x] Balance      [] Vision/perception   []Sensation     []Gross Motor Coordination  [] ROM  [] Delirium                   [] Motor Control     OT PLAN OF CARE   OT POC based on physician orders, patient diagnosis and results of clinical assessment    Frequency/Duration 1-3 days/wk for 2 weeks PRN   Specific OT Treatment Interventions to include:   * Instruction/training on adapted ADL techniques and AE recommendations to increase functional independence within precautions       * Training on energy conservation strategies, correct breathing pattern and techniques to improve independence/tolerance for self-care routine  * Functional transfer/mobility training/DME recommendations for increased independence, safety, and fall prevention  * Patient/Family education to increase follow through with safety techniques and functional independence  * Recommendation of environmental modifications for increased safety with functional transfers/mobility and ADLs  * Cognitive retraining/development of therapeutic activities to improve problem solving, judgement, memory, and attention for increased safety/participation in ADL/IADL tasks  * Therapeutic exercise to improve motor endurance, ROM, and functional strength for ADLs/functional transfers  * Therapeutic activities to facilitate/challenge dynamic balance, stand tolerance for increased safety and independence with ADLs  * Therapeutic activities to facilitate gross/fine motor skills for increased independence with ADLs  * Positioning to improve skin integrity, interaction with environment and functional independence      Recommended Adaptive Equipment: TBD     Home Living: Pt lives with spouse in 1 floor home.  Ramped entry    Bathroom setup: sponge bathes, utilizes BSC for toileting needs    Equipment owned: power w/c, BSC, [facility] bed, w/w, cane, home O2    Prior Level of Function: assist to dependent with ADLs , assist to dependent with IADLs; ambulated short distances only w/ w/w, usually utilizes w/c   Driving: no    Pain Level: Pt c/o buttock pain/discomfort this session ; nursing staff present. Reinforced repositioning to manage pain once task completed    Cognition: A&O: 3/4; Follows 1 step directions  Delayed processing speed. Easily distracted - cues to redirect provided  Mild intermittent confusion noted   Memory:  fair    Sequencing:  fair    Problem solving:  fair -   Judgement/safety:  fair -     Functional Assessment:  AM-PAC Daily Activity Raw Score: 14/24   Initial Eval Status  Date: 9/21/22 Treatment Status  Date: 9/23/22 STGs = LTGs  Time frame: 10-14 days   Feeding Stand by Assist  indep    Grooming Minimal Assist   Seated EOB SBA    Seated at EOB Modified [place]    UB Dressing Moderate Assist  Min A Supervision    LB Dressing Dependent  Dep Moderate Assist    Bathing Maximal Assist  UB/LB bathing task Max A Minimal Assist    Toileting Dependent  Dep Moderate Assist    Bed Mobility  Rolling: Mod A  Supine to sit: Moderate Assist   Sit to supine: Moderate Assist  SBA Supine to sit: Stand by Assist   Sit to supine: Stand by Assist    Functional Transfers Maximal Assist   Sit><partial stand only  Unable to complete full stand d/t noted B LE weakness and fatigue Max A  (Per previous session session)    Pt refusing sit to stand this date Minimal Assist    Functional Mobility NT NT Moderate Assist    Balance Sitting:     Static:  SBA    Dynamic:Mod A  Standing: Max A w/ w/w (partial stand only) Sitting: SBA    Standing: NT    Activity Tolerance Fair- F- Fair+   Visual/  Perceptual Glasses: no    wfl              Comments: Upon arrival pt supine in bed and agreeable to OT Session with encouragement. At end of session supine in bed with all lines and tubes intact, call light within reach.      Treatment: Facilitation of bed mobility (rolling, supine<>sit) and unsupported sitting balance at EOB (20+ minutes; impacting ADLs; addressing posture, weight shifting, dynamic reaching) - skilled cuing on hand placement, posture, body mechanics, energy conservation techniques and safety. Therapist facilitated self-care retraining: UB/LB self-care tasks (robe, socks) and grooming tasks while educating pt on modified techniques, posture, safety and energy conservation techniques. Skilled monitoring of HR, O2 sats and pts response to treatment. Pt has made fair progress towards set goals. Continue with current plan of care: addressing adl retraining, transfer training and functional ambulation.        Treatment Time In:747           Treatment Time Out: 810              Treatment Charges: Mins Units   Ther Ex  [id number]     Manual Therapy 00987     Thera Activities 34170 13 1   ADL/Home Mgt 44846 10 1   Neuro Re-ed 48118     Group Therapy      Orthotic manage/training  [id number]     Non-Billable Time     Total Timed Treatment 23 2        [facility] [place] OTR/L #[id number]

## 2022-09-23 NOTE — PLAN OF CARE
Problem: Pain  Goal: Verbalizes/displays adequate comfort level or baseline comfort level  Outcome: Progressing  Flowsheets (Taken 9/22/2022 2100)  Verbalizes/displays adequate comfort level or baseline comfort level: Encourage patient to monitor pain and request assistance     Problem: Safety - Adult  Goal: Free from fall injury  Outcome: Progressing     Problem: Cardiovascular - Adult  Goal: Maintains optimal cardiac output and hemodynamic stability  Outcome: Progressing  Flowsheets (Taken 9/22/2022 2100)  Maintains optimal cardiac output and hemodynamic stability: Monitor blood pressure and heart rate     Problem: Metabolic/Fluid and Electrolytes - Adult  Goal: Hemodynamic stability and optimal renal function maintained  Outcome: Progressing  Flowsheets (Taken 9/22/2022 2100)  Hemodynamic stability and optimal renal function maintained: Monitor labs and assess for signs and symptoms of volume excess or deficit     Problem: Chronic Conditions and Co-morbidities  Goal: Patient's chronic conditions and co-morbidity symptoms are monitored and maintained or improved  Outcome: Progressing  Flowsheets (Taken 9/22/2022 2100)  Care Plan - Patient's Chronic Conditions and Co-Morbidity Symptoms are Monitored and Maintained or Improved: Monitor and assess patient's chronic conditions and comorbid symptoms for stability, deterioration, or improvement     Problem: ABCDS Injury Assessment  Goal: Absence of physical injury  Outcome: Progressing     Problem: Nutrition Deficit:  Goal: Optimize nutritional status  Outcome: Progressing     Problem: Skin/Tissue Integrity  Goal: Absence of new skin breakdown  Description: 1. Monitor for areas of redness and/or skin breakdown  2. Assess vascular access sites hourly  3. Every 4-6 hours minimum:  Change oxygen saturation probe site  4.   Every 4-6 hours:  If on nasal continuous positive airway pressure, respiratory therapy assess nares and determine need for appliance change or resting period.   Outcome: Progressing     Problem: Discharge Planning  Goal: Discharge to home or other facility with appropriate resources  Outcome: Progressing  Flowsheets (Taken 9/22/2022 2100)  Discharge to home or other facility with appropriate resources: Identify barriers to discharge with patient and caregiver

## 2022-09-23 NOTE — PROGRESS NOTES
enlargement/tenderness/nodules or JVD. Lung: Breath sounds rhonchi. Respirations unlabored. Symmetrical expansion. Heart: RRR, normal S1, S2. No MRG  Abdomen: Soft, NT, ND. BS present x 4 quadrants. No bruit or organomegaly. Extremities: Pedal pulses 2+ symmetric b/l. Extremities normal, no cyanosis, clubbing, or edema. Musculokeletal: No joint swelling, no muscle tenderness. ROM normal in all joints of extremities. Neurologic: Mental status: Alert    Pertinent/ New Labs and Imaging Studies     Imaging Personally Reviewed:  CXR 9/20  No acute process. Stable cardiomegaly. Echo 7/16/22   Ejection fraction is visually estimated at 60%. No regional wall motion abnormalities seen. Moderate left ventricular concentric hypertrophy noted. Dilated right ventricle with reduced function. Left atrial volume index of 34 ml per meters squared BSA. Moderate mitral regurgitation is present. Moderate tricuspid regurgitation. Pulmonary hypertension is severe. RVSP is 70 mmHg.    No evidence for hemodynamically significant pericardial effusion      Labs:  Lab Results   Component Value Date/Time    WBC 4.7 09/23/2022 05:21 AM    HGB 9.5 09/23/2022 05:21 AM    HCT 33.9 09/23/2022 05:21 AM    MCV 89.0 09/23/2022 05:21 AM    MCH 24.9 09/23/2022 05:21 AM    MCHC 28.0 09/23/2022 05:21 AM    RDW 16.9 09/23/2022 05:21 AM     09/23/2022 05:21 AM    MPV 9.5 09/23/2022 05:21 AM     Lab Results   Component Value Date/Time     09/23/2022 05:21 AM    K 4.8 09/23/2022 05:21 AM    K 4.1 09/19/2022 04:53 AM    CL 99 09/23/2022 05:21 AM    CO2 29 09/23/2022 05:21 AM    BUN 12 09/23/2022 05:21 AM    CREATININE 0.9 09/23/2022 05:21 AM    LABALBU 2.9 09/13/2022 06:05 AM    LABALBU 3.6 05/02/2012 07:40 PM    CALCIUM 9.2 09/23/2022 05:21 AM    GFRAA >60 09/23/2022 05:21 AM    LABGLOM >60 09/23/2022 05:21 AM     Lab Results   Component Value Date/Time    PROTIME 11.0 03/11/2022 11:29 AM    PROTIME 12.7 05/02/2012 07:40 PM    INR 1.0 03/11/2022 11:29 AM     Recent Labs     09/23/22  0521   PROBNP 5,184*     No results for input(s): PROCAL in the last 72 hours. This SmartLink has not been configured with any valid records. Abg 9/16/22  7.22/72/330/29/0.6/99    Micro:  Nothing new      Assessment:    Acute on chronic respiratory failure with hypoxia and hypercapnia  GI bleed, Anemia  Pulmonary sarcoidosis  Restrictive lung disease  Obesity hypoventilation syndrome  LINDSAY  Chronic home O2 dependence 4 liters NC  Severe pulmonary hypertension WHO group 2, 3-chronic  Stage III severe COPD by Gold classification  Acute on chronic congestive heart failure with preserved EF  Moderate MR, Moderate TR  Chronic dyspnea  [place]'s disease  Diabetes mellitus II  Stage III chronic kidney disease  History of nicotine dependence  PAF  Obesity, BMI 35  Debility       Plan:   Wean oxygen as tolerated keep SpO2 between 88-92%, on 2L NC.    AVAPS HS and PRN-is much as patient tolerates  Bronchodilators: Brovana/Pulmicort twice daily, albuterol as needed  IRMA management per nephrology  Chronic prednisone 5 mg daily  GI/DVT prophylaxis   PT, OT   Appreciate Palliative input. Patient wishes to remain FULL CODE despite the fact that most time she refuses her NIV Case management for placement.  No further pulmonary interventions to offer as patient continues to be noncompliant with therapy       Electronically signed by Deshaun Burrell MD on 9/23/2022 at 3:35 PM

## 2022-09-23 NOTE — PROGRESS NOTES
Notified  patient oxygen saturation was dropping into 80's with 4L nasal cannula on. Increased oxygen to 6L and patient improved to 84%. Notified respiratory, they came to bedside to put patient on bipap. Patient agrees to keep bipap on at this time. Oxygen at 99% once bipap was on.

## 2022-09-23 NOTE — PROGRESS NOTES
Physical Therapy  Physical Therapy Initial Evaluation    Name: Myrna Covarrubias  : 1955  MRN: [id number]      Date of Service: 2022    Evaluating PT:  Brinda New, PT [id number]    Referring provider/PT Order:  PT Eval and Treat   22 1545  PT eval and treat       Tobias Gorman FRIDA - CNP     Room #:  [facility]-  Diagnosis:  UTI (urinary tract infection) [N39.0]  PMHx/PSHx:     has a past medical history of A-fib ([address] Able to transfer from chair to wheelchair, Abnormal mammogram, Acute on chronic respiratory failure ([address] Anemia, Anemia due to chronic illness, Ankle fracture, left, 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[address] Backache, Benign hypertension, CHF (congestive heart failure) ([address] Chronic back pain, Chronic kidney disease, Chronic pain disorder, Chronic, continuous use of opioids, Compression fracture of thoracic vertebra ([address] COPD (chronic obstructive pulmonary disease) ([address] Debility, Deformity of ankle and foot, acquired, Depression, Diabetes insipidus ([address] Diverticulitis, Dry eyes, Encephalopathy acute, Gait disturbance, GERD (gastroesophageal reflux disease), Hemorrhoids, Hernia, History of blood transfusion, History of Clostridium difficile, Hyperlipidemia, Hypothyroidism, Ischemic colitis, enteritis, or enterocolitis ([address] Long term (current) use of systemic steroids, Long-term current use of steroids, Lumbar disc herniation, Myalgia and myositis, unspecified, Nephrosclerosis, Nonunion of fracture, Osteoarthritis, PAF (paroxysmal atrial fibrillation) ([address] 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[address] Pulmonary hypertension ([address] Pulmonary sarcoidosis ([address] Rectal bleeding, Rhabdomyolysis, Steroid-induced avascular necrosis of shoulder ([address] Tibia fracture, and Ventral hernia without obstruction or gangrene. has a past surgical history that includes fracture surgery ();  Kyphosis surgery; Lumbar disc surgery; Tibia / fibia lengthening; other surgical history (11/1/11); Abdomen surgery (2008); Echo Complete (4/22/2013); ECHO Compl W Dop Color Flow (8/16/2015); Upper gastrointestinal endoscopy (1/4/2016); Hemorrhoid surgery (12/08/2016); Colonoscopy (2008); Colonoscopy (04/11/2014); Colonoscopy (11/23/2015); Colonoscopy (10/24/2016); Colonoscopy (07/26/2017); Upper gastrointestinal endoscopy (07/26/2017); sigmoidoscopy (N/A, 3/19/2021); and other surgical history (12/14/2021). Precautions:  Falls, O2 , FWB (full weight bearing) ,   Equipment Needs: To be determined,    SUBJECTIVE:    Pt lives with spouse in 1 floor home. Ramped entry. Equipment owned: power w/c, BSC, hospital bed, w/w, cane, home O2. Patient ambulated with wheeled walker short distances only. OBJECTIVE:   Initial Evaluation  Date: 9/23/22 Treatment Short Term/ Long Term   Goals   AM-PAC 6 Clicks 61/64     Was pt agreeable to Eval/treatment? yes     Does pt have pain? None stated     Bed Mobility  Rolling: SBA  Supine to sit:   SBA   Sit to supine: SBA   Scooting: SBA   Rolling: Ind  Supine to sit: Ind  Sit to supine: Ind  Scooting: Ind   Transfers Sit to stand: Max  declined  Stand to sit: NT  Stand pivot: NT   Sit to stand: Min to fww  Stand to sit: Min   Stand pivot: Min with fww   Ambulation    NT no side steps. TBD   Stair negotiation: ascended and descended  NT  Ramped entrance. Strength/ROM:     RLE grossly 3/5  LLE grossly 3/5  RLE AROM WFL  LLE AROM WFL    Balance:   Static Sitting: Ind  Dynamic Sitting: Ind  Static Standing: Max    Dynamic Standing: Max        Patient is Alert & Oriented x person, place, time, and situation and follows directions   Sensation:  Pt denies numbness and tingling to extremities  Edema:  none    Vitals:  Seated   Blood Pressure at rest -   Heart Rate at rest - bpm   SPO2 at rest 100% 2L     Therapeutic Exercises:  Functional activity as stated above.      Patient education  Pt educated regarding role of PT evaluation and need for OOB activity    Patient response to education:   Pt verbalized understanding Pt demonstrated skill Pt requires further education in this area   yes yes Reminders     ASSESSMENT:    Conditions Requiring Skilled Therapeutic Intervention:    [x]Decreased strength     [x]Decreased ROM  [x]Decreased functional mobility  [x]Decreased balance   [x]Decreased endurance   [x]Decreased posture  []Decreased sensation  []Decreased coordination   []Decreased vision  [x]Decreased safety awareness   []Increased pain       Comments:    RN cleared patient for participation in therapy session. Patient was seen this date for PT evaluation. . Patient was agreeable to intervention. Results of the functional assessment are noted above. Upon entering the room patient was found supine in bed. Assist required to transition to EOB. Sat EOB x 10 minutes to increase dynamic sitting balance and activity tolerance. Patient Max A attempted STS. At end of session, patient in bed with  call light and phone within reach,  all lines and tubes intact, nursing notified. This patient can benefit from the continuation of skilled PT possibly in a rehab setting to maximize functional level and return to PLOF. Treatment:  Patient completed and was instructed in the following treatment:      Bed mobility training - pt given verbal and tactile cues to facilitate proper sequencing and safety during rolling and supine>sit as well as provided with physical assistance to complete task    STS and transfer training - educated on hand/foot placement, safety, and sequencing during STS and pivot transfers using assistive device  Gait training -   Education in use of call light for safety  Skilled repositioning in bed. Pt's/ family goals      1. Home when able. Prognosis is good  for reaching above PT goals.     Patient and or family understand(s) diagnosis, prognosis, and plan of care.  yes,     PHYSICAL THERAPY PLAN OF CARE:    PT POC is established based on physician order and patient diagnosis     Diagnosis:  UTI (urinary tract infection) [N39.0]  Specific instructions for next treatment:  Sit EOB, postural exercises, Transfer to bedside chair, and Increase ambulation distance  Current Treatment Recommendations:     [x] Strengthening to improve independence with functional mobility   [x] ROM to improve independence with functional mobility   [x] Balance Training to improve static/dynamic balance and to reduce fall risk  [x] Endurance Training to improve activity tolerance during functional mobility   [x] Transfer Training to improve safety and independence with all functional transfers   [x] Gait Training to improve gait mechanics, endurance and asses need for appropriate assistive device  [x] Stair Training in preparation for safe discharge home and/or into the community when appropriate  [] Positioning to prevent skin breakdown and contractures  [x] Safety and Education Training   [] Patient/Caregiver Education   [] HEP  [] Other     PT long term treatment goals are located in above grid    Frequency of treatments: 2-5x/week x 1-2 weeks. Time in  0745  Time out  0825    Total Treatment Time  25 minutes     Evaluation Time includes thorough review of current medical information, gathering information on past medical history/social history and prior level of function, completion of standardized testing/informal observation of tasks, assessment of data and education on plan of care and goals.     CPT codes:  [x] Low Complexity PT evaluation 30365  [] Moderate Complexity PT evaluation 14368  [] High Complexity PT evaluation 01302  [] PT Re-evaluation 44959  [] Gait training 22468 - minutes  [] Manual therapy 61337  minutes  [x] Therapeutic activities 86011 25 minutes  [] Therapeutic exercises 62003 - minutes  [] Neuromuscular reeducation [phone]     Ramakrishna Rosa, [address] [facility]

## 2022-09-24 LAB
ANION GAP SERPL CALCULATED.3IONS-SCNC: 8 MMOL/L (ref 7–16)
ANISOCYTOSIS: ABNORMAL
B.E.: 6.1 MMOL/L (ref -3–3)
BASOPHILS ABSOLUTE: 0 E9/L (ref 0–0.2)
BASOPHILS RELATIVE PERCENT: 0.2 % (ref 0–2)
BUN BLDV-MCNC: 17 MG/DL (ref 6–23)
CALCIUM SERPL-MCNC: 9.4 MG/DL (ref 8.6–10.2)
CHLORIDE BLD-SCNC: 101 MMOL/L (ref 98–107)
CO2: 31 MMOL/L (ref 22–29)
COHB: 1.1 % (ref 0–1.5)
CREAT SERPL-MCNC: 0.8 MG/DL (ref 0.5–1)
CRITICAL: ABNORMAL
DATE ANALYZED: ABNORMAL
DATE OF COLLECTION: ABNORMAL
EOSINOPHILS ABSOLUTE: 0.44 E9/L (ref 0.05–0.5)
EOSINOPHILS RELATIVE PERCENT: 8 % (ref 0–6)
GFR AFRICAN AMERICAN: >60
GFR NON-AFRICAN AMERICAN: >60 ML/MIN/1.73
GLUCOSE BLD-MCNC: 76 MG/DL (ref 74–99)
HCO3: 33.3 MMOL/L (ref 22–26)
HCT VFR BLD CALC: 33.1 % (ref 34–48)
HEMOGLOBIN: 9.6 G/DL (ref 11.5–15.5)
HHB: 3.7 % (ref 0–5)
HYPOCHROMIA: ABNORMAL
LAB: ABNORMAL
LYMPHOCYTES ABSOLUTE: 0.28 E9/L (ref 1.5–4)
LYMPHOCYTES RELATIVE PERCENT: 4.5 % (ref 20–42)
Lab: ABNORMAL
MCH RBC QN AUTO: 24.9 PG (ref 26–35)
MCHC RBC AUTO-ENTMCNC: 29 % (ref 32–34.5)
MCV RBC AUTO: 86 FL (ref 80–99.9)
METER GLUCOSE: 119 MG/DL (ref 74–99)
METHB: 0.2 % (ref 0–1.5)
MODE: ABNORMAL
MONOCYTES ABSOLUTE: 0.77 E9/L (ref 0.1–0.95)
MONOCYTES RELATIVE PERCENT: 14.3 % (ref 2–12)
NEUTROPHILS ABSOLUTE: 4.02 E9/L (ref 1.8–7.3)
NEUTROPHILS RELATIVE PERCENT: 73.2 % (ref 43–80)
O2 SATURATION: 96.3 % (ref 92–98.5)
O2HB: 95 % (ref 94–97)
OPERATOR ID: 8215
OVALOCYTES: ABNORMAL
PATIENT TEMP: 37 C
PCO2: 63.3 MMHG (ref 35–45)
PDW BLD-RTO: 17.1 FL (ref 11.5–15)
PH BLOOD GAS: 7.34 (ref 7.35–7.45)
PLATELET # BLD: 226 E9/L (ref 130–450)
PMV BLD AUTO: 10.7 FL (ref 7–12)
PO2: 91.4 MMHG (ref 75–100)
POIKILOCYTES: ABNORMAL
POLYCHROMASIA: ABNORMAL
POTASSIUM SERPL-SCNC: 4.1 MMOL/L (ref 3.5–5)
PRO-BNP: 6780 PG/ML (ref 0–125)
RBC # BLD: 3.85 E12/L (ref 3.5–5.5)
SMUDGE CELLS: ABNORMAL
SODIUM BLD-SCNC: 140 MMOL/L (ref 132–146)
SOURCE, BLOOD GAS: ABNORMAL
STOMATOCYTES: ABNORMAL
THB: 10.6 G/DL (ref 11.5–16.5)
TIME ANALYZED: 1859
WBC # BLD: 5.5 E9/L (ref 4.5–11.5)

## 2022-09-24 PROCEDURE — 6360000002 HC RX W HCPCS: Performed by: FAMILY MEDICINE

## 2022-09-24 PROCEDURE — 6370000000 HC RX 637 (ALT 250 FOR IP): Performed by: STUDENT IN AN ORGANIZED HEALTH CARE EDUCATION/TRAINING PROGRAM

## 2022-09-24 PROCEDURE — 2700000000 HC OXYGEN THERAPY PER DAY

## 2022-09-24 PROCEDURE — 2580000003 HC RX 258: Performed by: FAMILY MEDICINE

## 2022-09-24 PROCEDURE — 83880 ASSAY OF NATRIURETIC PEPTIDE: CPT

## 2022-09-24 PROCEDURE — 36415 COLL VENOUS BLD VENIPUNCTURE: CPT

## 2022-09-24 PROCEDURE — 94660 CPAP INITIATION&MGMT: CPT

## 2022-09-24 PROCEDURE — 85025 COMPLETE CBC W/AUTO DIFF WBC: CPT

## 2022-09-24 PROCEDURE — 2140000000 HC CCU INTERMEDIATE R&B

## 2022-09-24 PROCEDURE — 6370000000 HC RX 637 (ALT 250 FOR IP): Performed by: INTERNAL MEDICINE

## 2022-09-24 PROCEDURE — 82962 GLUCOSE BLOOD TEST: CPT

## 2022-09-24 PROCEDURE — 80048 BASIC METABOLIC PNL TOTAL CA: CPT

## 2022-09-24 PROCEDURE — 6370000000 HC RX 637 (ALT 250 FOR IP): Performed by: FAMILY MEDICINE

## 2022-09-24 PROCEDURE — 6370000000 HC RX 637 (ALT 250 FOR IP)

## 2022-09-24 PROCEDURE — 82805 BLOOD GASES W/O2 SATURATION: CPT

## 2022-09-24 PROCEDURE — 94640 AIRWAY INHALATION TREATMENT: CPT

## 2022-09-24 RX ADMIN — DESMOPRESSIN ACETATE 200 MCG: 0.1 TABLET ORAL at 21:57

## 2022-09-24 RX ADMIN — DILTIAZEM HYDROCHLORIDE 30 MG: 30 TABLET, FILM COATED ORAL at 21:56

## 2022-09-24 RX ADMIN — HYDROCORTISONE: 25 CREAM TOPICAL at 09:11

## 2022-09-24 RX ADMIN — FAMOTIDINE 20 MG: 20 TABLET ORAL at 09:05

## 2022-09-24 RX ADMIN — GUAIFENESIN 400 MG: 400 TABLET ORAL at 09:04

## 2022-09-24 RX ADMIN — Medication 10 ML: at 09:05

## 2022-09-24 RX ADMIN — OXYCODONE AND ACETAMINOPHEN 2 TABLET: 5; 325 TABLET ORAL at 09:10

## 2022-09-24 RX ADMIN — ESCITALOPRAM OXALATE 10 MG: 10 TABLET ORAL at 21:56

## 2022-09-24 RX ADMIN — GUAIFENESIN 400 MG: 400 TABLET ORAL at 21:57

## 2022-09-24 RX ADMIN — Medication 10 ML: at 22:00

## 2022-09-24 RX ADMIN — HYDROCORTISONE: 25 CREAM TOPICAL at 22:00

## 2022-09-24 RX ADMIN — OXYCODONE AND ACETAMINOPHEN 2 TABLET: 5; 325 TABLET ORAL at 22:06

## 2022-09-24 RX ADMIN — PREDNISONE 5 MG: 5 TABLET ORAL at 09:04

## 2022-09-24 RX ADMIN — ARFORMOTEROL TARTRATE 15 MCG: 15 SOLUTION RESPIRATORY (INHALATION) at 19:32

## 2022-09-24 RX ADMIN — ENOXAPARIN SODIUM 40 MG: 100 INJECTION SUBCUTANEOUS at 09:05

## 2022-09-24 RX ADMIN — ESCITALOPRAM OXALATE 10 MG: 10 TABLET ORAL at 09:05

## 2022-09-24 RX ADMIN — DILTIAZEM HYDROCHLORIDE 30 MG: 30 TABLET, FILM COATED ORAL at 06:11

## 2022-09-24 RX ADMIN — FERROUS SULFATE TAB 325 MG (65 MG ELEMENTAL FE) 325 MG: 325 (65 FE) TAB at 17:00

## 2022-09-24 RX ADMIN — BUDESONIDE 250 MCG: 0.25 SUSPENSION RESPIRATORY (INHALATION) at 19:32

## 2022-09-24 RX ADMIN — DILTIAZEM HYDROCHLORIDE 30 MG: 30 TABLET, FILM COATED ORAL at 12:27

## 2022-09-24 RX ADMIN — OXYCODONE AND ACETAMINOPHEN 2 TABLET: 5; 325 TABLET ORAL at 15:38

## 2022-09-24 RX ADMIN — LEVOTHYROXINE SODIUM 75 MCG: 0.07 TABLET ORAL at 06:11

## 2022-09-24 RX ADMIN — METOPROLOL SUCCINATE 100 MG: 100 TABLET, FILM COATED, EXTENDED RELEASE ORAL at 09:04

## 2022-09-24 RX ADMIN — GUAIFENESIN 400 MG: 400 TABLET ORAL at 15:38

## 2022-09-24 RX ADMIN — FERROUS SULFATE TAB 325 MG (65 MG ELEMENTAL FE) 325 MG: 325 (65 FE) TAB at 09:05

## 2022-09-24 ASSESSMENT — PAIN DESCRIPTION - LOCATION
LOCATION: GENERALIZED
LOCATION: ABDOMEN;BACK
LOCATION: BACK

## 2022-09-24 ASSESSMENT — PAIN DESCRIPTION - ORIENTATION
ORIENTATION: LOWER
ORIENTATION: MID;LOWER

## 2022-09-24 ASSESSMENT — PAIN - FUNCTIONAL ASSESSMENT
PAIN_FUNCTIONAL_ASSESSMENT: PREVENTS OR INTERFERES SOME ACTIVE ACTIVITIES AND ADLS
PAIN_FUNCTIONAL_ASSESSMENT: PREVENTS OR INTERFERES SOME ACTIVE ACTIVITIES AND ADLS

## 2022-09-24 ASSESSMENT — PAIN SCALES - GENERAL
PAINLEVEL_OUTOF10: 2
PAINLEVEL_OUTOF10: 10
PAINLEVEL_OUTOF10: 10
PAINLEVEL_OUTOF10: 3
PAINLEVEL_OUTOF10: 10

## 2022-09-24 NOTE — PLAN OF CARE
Problem: Pain  Goal: Verbalizes/displays adequate comfort level or baseline comfort level  Outcome: Progressing     Problem: Safety - Adult  Goal: Free from fall injury  Outcome: Progressing     Problem: Cardiovascular - Adult  Goal: Maintains optimal cardiac output and hemodynamic stability  Outcome: Progressing  Flowsheets (Taken 9/23/2022 2123)  Maintains optimal cardiac output and hemodynamic stability: Monitor blood pressure and heart rate     Problem: Metabolic/Fluid and Electrolytes - Adult  Goal: Hemodynamic stability and optimal renal function maintained  Outcome: Progressing  Flowsheets (Taken 9/23/2022 2123)  Hemodynamic stability and optimal renal function maintained:   Monitor intake, output and patient weight   Monitor labs and assess for signs and symptoms of volume excess or deficit     Problem: Chronic Conditions and Co-morbidities  Goal: Patient's chronic conditions and co-morbidity symptoms are monitored and maintained or improved  Outcome: Progressing  Flowsheets (Taken 9/23/2022 2123)  Care Plan - Patient's Chronic Conditions and Co-Morbidity Symptoms are Monitored and Maintained or Improved: Monitor and assess patient's chronic conditions and comorbid symptoms for stability, deterioration, or improvement     Problem: ABCDS Injury Assessment  Goal: Absence of physical injury  Outcome: Progressing     Problem: Nutrition Deficit:  Goal: Optimize nutritional status  Outcome: Progressing     Problem: Skin/Tissue Integrity  Goal: Absence of new skin breakdown  Description: 1. Monitor for areas of redness and/or skin breakdown  2. Assess vascular access sites hourly  3. Every 4-6 hours minimum:  Change oxygen saturation probe site  4. Every 4-6 hours:  If on nasal continuous positive airway pressure, respiratory therapy assess nares and determine need for appliance change or resting period.   Outcome: Progressing     Problem: Discharge Planning  Goal: Discharge to home or other facility with appropriate resources  Outcome: Progressing  Flowsheets (Taken 9/23/2022 2123)  Discharge to home or other facility with appropriate resources: Identify barriers to discharge with patient and caregiver

## 2022-09-24 NOTE — PROGRESS NOTES
Hospitalist Progress Note      Synopsis: Patient admitted on 9/12/2022   Ms. Gerhardt Hones, a 73y.o. year old female  who  has a past medical history of A-fib ([address] Able to transfer from chair to wheelchair, Abnormal mammogram, Acute on chronic respiratory failure ([address] Anemia, Anemia due to chronic illness, Ankle fracture, left, 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[address] Backache, Benign hypertension, CHF (congestive heart failure) ([address] Chronic back pain, Chronic kidney disease, Chronic pain disorder, Chronic, continuous use of opioids, Compression fracture of thoracic vertebra ([address] COPD (chronic obstructive pulmonary disease) ([address] Debility, Deformity of ankle and foot, acquired, Depression, Diabetes insipidus ([address] Diverticulitis, Dry eyes, Encephalopathy acute, Gait disturbance, GERD (gastroesophageal reflux disease), Hemorrhoids, Hernia, History of blood transfusion, History of Clostridium difficile, Hyperlipidemia, Hypothyroidism, Ischemic colitis, enteritis, or enterocolitis ([address] Long term (current) use of systemic steroids, Long-term current use of steroids, Lumbar disc herniation, Myalgia and myositis, unspecified, Nephrosclerosis, Nonunion of fracture, Osteoarthritis, PAF (paroxysmal atrial fibrillation) ([address] 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[address] Pulmonary hypertension ([address] Pulmonary sarcoidosis ([address] Rectal bleeding, Rhabdomyolysis, Steroid-induced avascular necrosis of shoulder ([address] Tibia fracture, and Ventral hernia without obstruction or gangrene. Patient presented to the emergency department with abdominal pain and bright red blood per rectum. ER physician noted bright red streak of blood on rectal exam.  Vital signs within normal limits and stable. Laboratory studies demonstrate BUN 20, creatinine 2.0, hemoglobin 10.1. Urinalysis possible urinary tract infection. Patient given a dose of ceftriaxone. Given IV fluids for acute renal failure.   Medicine consulted for admission. Patient has been following for her hemoglobin. She remains on DDAVP for sarcoidosis  \  Subjective    She is on complaining. She states that she needs a dressing changed on her abdomen    Exam:  BP (!) 153/95   Pulse 83   Temp 98.5 °F (36.9 °C) (Axillary)   Resp 22   Ht 5' (1.524 m)   Wt 184 lb (83.5 kg)   LMP  (LMP Unknown)   SpO2 100%   BMI 35.94 kg/m²   General appearance: No apparent distress, appears stated age and cooperative. HEENT: Pupils equal, round, and reactive to light. Conjunctivae/corneas clear. Neck: Trachea midline. Respiratory: Decreased  Cardiovascular: Regular rate and rhythm with normal S1/S2 without murmurs, rubs or gallops. Abdomen: Soft, non-tender, non-distended with normal bowel sounds.   Abdominal dressing in place over abdominal area  Musculoskeletal: No clubbing, cyanosis Skin:  No rashes    Neurologic: awake, alert and following commands   Eyes are slow to open but she does respond well    Medications:  Reviewed    Infusion Medications    dextrose      sodium chloride       Scheduled Medications    hydrocortisone   Rectal BID    desmopressin  200 mcg Oral Nightly    [Held by provider] furosemide  40 mg IntraVENous BID    guaiFENesin  400 mg Oral TID    dilTIAZem  30 mg Oral TID    escitalopram  10 mg Oral BID    famotidine  20 mg Oral Daily    ferrous sulfate  325 mg Oral BID WC    levothyroxine  75 mcg Oral QAM AC    metoprolol succinate  100 mg Oral Daily    predniSONE  5 mg Oral Daily    budesonide  0.25 mg Nebulization BID    And    Arformoterol Tartrate  15 mcg Nebulization BID    enoxaparin  40 mg SubCUTAneous Daily    sodium chloride flush  10 mL IntraVENous 2 times per day     PRN Meds: perflutren lipid microspheres, white petrolatum, bismuth subsalicylate, potassium chloride **OR** potassium alternative oral replacement **OR** potassium chloride, glucose, dextrose bolus **OR** dextrose bolus, glucagon (rDNA), dextrose, promethazine-codeine, loperamide, iopamidol, oxyCODONE-acetaminophen, albuterol, sodium chloride flush, sodium chloride, [DISCONTINUED] promethazine **OR** ondansetron, polyethylene glycol, acetaminophen **OR** acetaminophen    I/O    Intake/Output Summary (Last 24 hours) at 9/24/2022 1253  Last data filed at 9/24/2022 5847  Gross per 24 hour   Intake 190 ml   Output 1600 ml   Net -1410 ml       Labs:   Recent Labs     09/22/22 0513 09/23/22 0521 09/24/22 0527   WBC 4.5 4.7 5.5   HGB 9.4* 9.5* 9.6*   HCT 31.7* 33.9* 33.1*    205 226       Recent Labs     09/22/22 0513 09/23/22 0521 09/24/22 0527    137 140   K 4.1 4.8 4.1   CL 99 99 101   CO2 27 29 31*   BUN 17 12 17   CREATININE 1.0 0.9 0.8   CALCIUM 8.8 9.2 9.4       No results for input(s): PROT, ALB, ALKPHOS, ALT, AST, BILITOT, AMYLASE, LIPASE in the last 72 hours. No results for input(s): INR in the last 72 hours. No results for input(s): Mishel Chappellk in the last 72 hours. Chronic labs:  Lab Results   Component Value Date    CHOL 229 (H) 11/04/2021    TRIG 70 11/04/2021    HDL 88 11/04/2021    LDLCALC 127 (H) 11/04/2021    TSH 0.041 (L) 07/16/2022    INR 1.0 03/11/2022    LABA1C 5.3 11/20/2013       Microbiology:  Pending  No results for input(s): BC in the last 72 hours. No results for input(s): ORG in the last 72 hours. No results for input(s): Saniya Das in the last 72 hours. No results for input(s): STREPNEUMAGU in the last 72 hours. No results for input(s): LP1UAG in the last 72 hours. No results for input(s): ASO in the last 72 hours. No results for input(s): CULTRESP in the last 72 hours. No results for input(s): PROCAL in the last 72 hours. Radiology:  XR CHEST (2 VW)    Result Date: 9/16/2022  Cardiomegaly with moderate interstitial pulmonary edema. CT ABDOMEN PELVIS W IV CONTRAST Additional Contrast? Radiologist Recommendation    Result Date: 9/15/2022  1. Gas fluid levels within the colon may reflect a diarrheal process.  2. Postop changes in the abdomen as described. No evidence of bowel obstruction. No free air or free fluid. 3.  Stable lung base opacities and chronic interstitial lung disease. CT ABDOMEN PELVIS W IV CONTRAST Additional Contrast? None    Result Date: 9/10/2022  1. Stable ventral abdominal wall hernia. 2. No bowel obstruction, free air, or free fluid. 3. Diverticulosis 4. Cholelithiasis 5. Redemonstration of opacities in lung bases suggestive of subsegmental atelectasis and chronic interstitial lung disease. XR CHEST PORTABLE    Result Date: 9/20/2022  No acute process. Stable cardiomegaly. XR CHEST PORTABLE    Result Date: 9/18/2022  Stable heart size with mild pulmonary vascular congestive changes. ASSESSMENT:    Principal Problem:    BRBPR (bright red blood per rectum)  Resolved Problems:    * No resolved hospital problems. *  Ventral abdominal wall hernia  Central diabetes insipidus  Adrenal insufficiency  Pulmonary hypertension  Heart failure with preserved ejection fraction  PLAN:    1. She is tolerating her medications pretty well  2. Awaiting discharge planning  3. Maintaining pulmonary consult for severe pulmonary hypertension and severe COPD by Gold classification  4. Valvular disease known. 5.  Monitor blood sugars though she does not have any long-acting insulin    6. History of A. fib maintain medications        Diet: ADULT DIET; Regular; Low Sodium (2 gm)  ADULT ORAL NUTRITION SUPPLEMENT; Breakfast, Dinner; Wound Healing Oral Supplement  ADULT ORAL NUTRITION SUPPLEMENT; Lunch; Low Calorie/High Protein Oral Supplement  Code Status: Full Code  PT/OT Eval Status:    In place  DVT Prophylaxis:   In place  Recommended disposition at discharge: Awaiting discharge planning to skilled facility    +++++++++++++++++++++++++++++++++++++++++++++++++  Reba Rosas MD   [facility].  +++++++++++++++++++++++++++++++++++++++++++++++++  NOTE: This report was transcribed using voice recognition software. Every effort was made to ensure accuracy; however, inadvertent computerized transcription errors may be present.

## 2022-09-24 NOTE — PROGRESS NOTES
on prednisone  HTN, on metoprolol  HFpEF 60%, on metoprolol, Echo pending, Lasix on hold.  Pro-BNP 13,419 >>8,729> 5,184>>6,780  ---------------------------------------------------------------  Pulmonary HTN  PAF, on diltiazem and metoprolol  Hypothyroidism, probably secondary  Anemia, normocytic     Plan:    Continue prednisone 5 mg PO daily   Continue diltiazem 30 mg p.o. 3 times daily  Continue metoprolol succinate 100 mg daily  Continue to hold Lasix for now  Continue DDAVP 200 mcg nightly  Discharge planning      Electronically signed by FRIDA Gandhi CNP on 9/24/2022 at 12:22 PM

## 2022-09-24 NOTE — PLAN OF CARE
Problem: Pain  Goal: Verbalizes/displays adequate comfort level or baseline comfort level  9/24/2022 1056 by Shavon Manrique RN  Outcome: Progressing  9/24/2022 0412 by Jun Pedroza RN  Outcome: Progressing     Problem: Safety - Adult  Goal: Free from fall injury  9/24/2022 1056 by Shavon Manrique RN  Outcome: Progressing  9/24/2022 0412 by Jun Pedroza RN  Outcome: Progressing     Problem: Cardiovascular - Adult  Goal: Maintains optimal cardiac output and hemodynamic stability  9/24/2022 1056 by Sahvon Manrique RN  Outcome: Progressing  9/24/2022 0412 by Jun Pedroza RN  Outcome: Progressing  Flowsheets (Taken 9/23/2022 2123)  Maintains optimal cardiac output and hemodynamic stability: Monitor blood pressure and heart rate     Problem: Metabolic/Fluid and Electrolytes - Adult  Goal: Hemodynamic stability and optimal renal function maintained  9/24/2022 1056 by Shavon Manrique RN  Outcome: Progressing  9/24/2022 0412 by Jun Pedroza RN  Outcome: Progressing  Flowsheets (Taken 9/23/2022 2123)  Hemodynamic stability and optimal renal function maintained:   Monitor intake, output and patient weight   Monitor labs and assess for signs and symptoms of volume excess or deficit     Problem: Chronic Conditions and Co-morbidities  Goal: Patient's chronic conditions and co-morbidity symptoms are monitored and maintained or improved  9/24/2022 1056 by Shavon Manrique RN  Outcome: Progressing  9/24/2022 0412 by Jun Pedroza RN  Outcome: Progressing  Flowsheets (Taken 9/23/2022 2123)  Care Plan - Patient's Chronic Conditions and Co-Morbidity Symptoms are Monitored and Maintained or Improved: Monitor and assess patient's chronic conditions and comorbid symptoms for stability, deterioration, or improvement     Problem: ABCDS Injury Assessment  Goal: Absence of physical injury  9/24/2022 1056 by Shavon Manrique RN  Outcome: Progressing  9/24/2022 0412 by Jun Pedroza RN  Outcome: Progressing     Problem: Nutrition Deficit:  Goal: Optimize nutritional status  9/24/2022 1056 by Jodee Pham RN  Outcome: Progressing  9/24/2022 0412 by Antelmo Rosas RN  Outcome: Progressing     Problem: Skin/Tissue Integrity  Goal: Absence of new skin breakdown  Description: 1. Monitor for areas of redness and/or skin breakdown  2. Assess vascular access sites hourly  3. Every 4-6 hours minimum:  Change oxygen saturation probe site  4. Every 4-6 hours:  If on nasal continuous positive airway pressure, respiratory therapy assess nares and determine need for appliance change or resting period.   9/24/2022 1056 by Jodee Pham RN  Outcome: Progressing  9/24/2022 0412 by Antelmo Rosas RN  Outcome: Progressing     Problem: Discharge Planning  Goal: Discharge to home or other facility with appropriate resources  9/24/2022 1056 by Jodee Pham RN  Outcome: Progressing  9/24/2022 0412 by Antelmo Rosas RN  Outcome: Progressing  Flowsheets (Taken 9/23/2022 2123)  Discharge to home or other facility with appropriate resources: Identify barriers to discharge with patient and caregiver

## 2022-09-24 NOTE — PROGRESS NOTES
Nevin Calloway M.D.,[facility]  Matt Le D.O., F.A.C.O.I., Shandra Jordan M.D. Newton Dove M.D. Shantanu Medina D.O. Daily Pulmonary Progress Note    Patient:  Quentin Joyce 73 y.o. female MRN: [id number]     Date of Service: 9/24/2022      Synopsis     We are following patient for respiratory failure, sarcoidosis     \"CC\"  rectal bleeding    Code status: FULL       Subjective      Patient was seen and examined lying in bed in no apparent distress. She is more alert today, will need to wear NIV every night and with naps. She refused last night. Currently on 5 L oxygen. Reviewed with nursing. Review of Systems:  Denies shortness of breath, cough, chest pain, wheezing    24-hour events:  None     Objective   Vitals: BP (!) 153/95   Pulse 83   Temp 98.5 °F (36.9 °C) (Axillary)   Resp 22   Ht 5' (1.524 m)   Wt 184 lb (83.5 kg)   LMP  (LMP Unknown)   SpO2 100%   BMI 35.94 kg/m²     I/O:    Intake/Output Summary (Last 24 hours) at 9/24/2022 1310  Last data filed at 9/24/2022 3512  Gross per 24 hour   Intake 190 ml   Output 1600 ml   Net -1410 ml             CPAP/EPAP: 8 cmH2O     CURRENT MEDS :  Scheduled Meds:   hydrocortisone   Rectal BID    desmopressin  200 mcg Oral Nightly    [Held by provider] furosemide  40 mg IntraVENous BID    guaiFENesin  400 mg Oral TID    dilTIAZem  30 mg Oral TID    escitalopram  10 mg Oral BID    famotidine  20 mg Oral Daily    ferrous sulfate  325 mg Oral BID WC    levothyroxine  75 mcg Oral QAM AC    metoprolol succinate  100 mg Oral Daily    predniSONE  5 mg Oral Daily    budesonide  0.25 mg Nebulization BID    And    Arformoterol Tartrate  15 mcg Nebulization BID    enoxaparin  40 mg SubCUTAneous Daily    sodium chloride flush  10 mL IntraVENous 2 times per day       Physical Exam:  General Appearance: appears comfortable in no acute distress.    HEENT: Normocephalic atraumatic without obvious abnormality   Neck: Lips, mucosa, and tongue normal. Supple, symmetrical, trachea midline, no adenopathy;thyroid: no enlargement/tenderness/nodules or JVD. Lung: Breath sounds rhonchi. Respirations unlabored. Symmetrical expansion. Heart: RRR, normal S1, S2. No MRG  Abdomen: Soft, NT, ND. BS present x 4 quadrants. No bruit or organomegaly. Extremities: Pedal pulses 2+ symmetric b/l. Extremities normal, no cyanosis, clubbing, or edema. Musculokeletal: No joint swelling, no muscle tenderness. ROM normal in all joints of extremities. Neurologic: Mental status: Alert    Pertinent/ New Labs and Imaging Studies     Imaging Personally Reviewed:  CXR 9/20  No acute process. Stable cardiomegaly. Echo 7/16/22   Ejection fraction is visually estimated at 60%. No regional wall motion abnormalities seen. Moderate left ventricular concentric hypertrophy noted. Dilated right ventricle with reduced function. Left atrial volume index of 34 ml per meters squared BSA. Moderate mitral regurgitation is present. Moderate tricuspid regurgitation. Pulmonary hypertension is severe. RVSP is 70 mmHg.    No evidence for hemodynamically significant pericardial effusion      Labs:  Lab Results   Component Value Date/Time    WBC 5.5 09/24/2022 05:27 AM    HGB 9.6 09/24/2022 05:27 AM    HCT 33.1 09/24/2022 05:27 AM    MCV 86.0 09/24/2022 05:27 AM    MCH 24.9 09/24/2022 05:27 AM    MCHC 29.0 09/24/2022 05:27 AM    RDW 17.1 09/24/2022 05:27 AM     09/24/2022 05:27 AM    MPV 10.7 09/24/2022 05:27 AM     Lab Results   Component Value Date/Time     09/24/2022 05:27 AM    K 4.1 09/24/2022 05:27 AM    K 4.1 09/19/2022 04:53 AM     09/24/2022 05:27 AM    CO2 31 09/24/2022 05:27 AM    BUN 17 09/24/2022 05:27 AM    CREATININE 0.8 09/24/2022 05:27 AM    LABALBU 2.9 09/13/2022 06:05 AM    LABALBU 3.6 05/02/2012 07:40 PM    CALCIUM 9.4 09/24/2022 05:27 AM    GFRAA >60 09/24/2022 05:27 AM    LABGLOM >60 09/24/2022 05:27 AM     Lab Results   Component Value Date/Time    PROTIME 11.0 03/11/2022 11:29 AM    PROTIME 12.7 05/02/2012 07:40 PM    INR 1.0 03/11/2022 11:29 AM     Recent Labs     09/24/22  0527   PROBNP 6,780*       No results for input(s): PROCAL in the last 72 hours. This SmartLink has not been configured with any valid records. Abg 9/16/22  7.22/72/330/29/0.6/99    Micro:  Nothing new      Assessment:    Acute on chronic respiratory failure with hypoxia and hypercapnia  GI bleed, Anemia  Pulmonary sarcoidosis  Restrictive lung disease  Obesity hypoventilation syndrome  LINDSAY  Chronic home O2 dependence 4 liters NC  Severe pulmonary hypertension WHO group 2, 3-chronic  Stage III severe COPD by Gold classification  Acute on chronic congestive heart failure with preserved EF  Moderate MR, Moderate TR  Chronic dyspnea  [place]'s disease  Diabetes mellitus II  Stage III chronic kidney disease  History of nicotine dependence  PAF  Obesity, BMI 35  Debility       Plan:   Wean oxygen as tolerated keep SpO2 between 88-92%, on 5L NC.    AVAPS HS and PRN-is much as patient tolerates  Bronchodilators: Brovana/Pulmicort twice daily, albuterol as needed  IRMA management per nephrology, holding Lasix, continue DDAVP  Chronic prednisone 5 mg daily  GI/DVT prophylaxis   PT, OT   Appreciate Palliative input. Patient wishes to remain FULL CODE despite the fact that most time she refuses her NIV     Plan of care reviewed in collaboration with Dr. Melissa Corona  Electronically signed by Reynaldo Oro, FRIDA Xiao CNP on 9/24/2022 at 1:10 PM    I personally saw, examined, and cared for the patient. Labs, medications, radiographs reviewed. I agree with history exam and plans detailed in NP note.    Deshaun Burrell MD

## 2022-09-25 LAB
ANION GAP SERPL CALCULATED.3IONS-SCNC: 7 MMOL/L (ref 7–16)
ANISOCYTOSIS: ABNORMAL
BASOPHILIC STIPPLING: ABNORMAL
BASOPHILS ABSOLUTE: 0 E9/L (ref 0–0.2)
BASOPHILS RELATIVE PERCENT: 0.4 % (ref 0–2)
BUN BLDV-MCNC: 18 MG/DL (ref 6–23)
CALCIUM SERPL-MCNC: 9.1 MG/DL (ref 8.6–10.2)
CHLORIDE BLD-SCNC: 99 MMOL/L (ref 98–107)
CO2: 32 MMOL/L (ref 22–29)
CREAT SERPL-MCNC: 0.9 MG/DL (ref 0.5–1)
EOSINOPHILS ABSOLUTE: 0.31 E9/L (ref 0.05–0.5)
EOSINOPHILS RELATIVE PERCENT: 6.1 % (ref 0–6)
GFR AFRICAN AMERICAN: >60
GFR NON-AFRICAN AMERICAN: >60 ML/MIN/1.73
GLUCOSE BLD-MCNC: 66 MG/DL (ref 74–99)
HCT VFR BLD CALC: 31.2 % (ref 34–48)
HEMOGLOBIN: 9.2 G/DL (ref 11.5–15.5)
HYPOCHROMIA: ABNORMAL
LYMPHOCYTES ABSOLUTE: 0.5 E9/L (ref 1.5–4)
LYMPHOCYTES RELATIVE PERCENT: 10.4 % (ref 20–42)
MCH RBC QN AUTO: 25.5 PG (ref 26–35)
MCHC RBC AUTO-ENTMCNC: 29.5 % (ref 32–34.5)
MCV RBC AUTO: 86.4 FL (ref 80–99.9)
MONOCYTES ABSOLUTE: 0.25 E9/L (ref 0.1–0.95)
MONOCYTES RELATIVE PERCENT: 5.2 % (ref 2–12)
NEUTROPHILS ABSOLUTE: 3.9 E9/L (ref 1.8–7.3)
NEUTROPHILS RELATIVE PERCENT: 78.3 % (ref 43–80)
NUCLEATED RED BLOOD CELLS: 0.9 /100 WBC
OVALOCYTES: ABNORMAL
PDW BLD-RTO: 16.9 FL (ref 11.5–15)
PLATELET # BLD: 187 E9/L (ref 130–450)
PMV BLD AUTO: 10.6 FL (ref 7–12)
POIKILOCYTES: ABNORMAL
POLYCHROMASIA: ABNORMAL
POTASSIUM SERPL-SCNC: 4.1 MMOL/L (ref 3.5–5)
RBC # BLD: 3.61 E12/L (ref 3.5–5.5)
SCHISTOCYTES: ABNORMAL
SODIUM BLD-SCNC: 138 MMOL/L (ref 132–146)
TARGET CELLS: ABNORMAL
WBC # BLD: 5 E9/L (ref 4.5–11.5)

## 2022-09-25 PROCEDURE — 36415 COLL VENOUS BLD VENIPUNCTURE: CPT

## 2022-09-25 PROCEDURE — 6370000000 HC RX 637 (ALT 250 FOR IP): Performed by: STUDENT IN AN ORGANIZED HEALTH CARE EDUCATION/TRAINING PROGRAM

## 2022-09-25 PROCEDURE — 6360000002 HC RX W HCPCS: Performed by: FAMILY MEDICINE

## 2022-09-25 PROCEDURE — 94640 AIRWAY INHALATION TREATMENT: CPT

## 2022-09-25 PROCEDURE — 6370000000 HC RX 637 (ALT 250 FOR IP): Performed by: INTERNAL MEDICINE

## 2022-09-25 PROCEDURE — 2700000000 HC OXYGEN THERAPY PER DAY

## 2022-09-25 PROCEDURE — 85025 COMPLETE CBC W/AUTO DIFF WBC: CPT

## 2022-09-25 PROCEDURE — 80048 BASIC METABOLIC PNL TOTAL CA: CPT

## 2022-09-25 PROCEDURE — 2580000003 HC RX 258: Performed by: FAMILY MEDICINE

## 2022-09-25 PROCEDURE — 6370000000 HC RX 637 (ALT 250 FOR IP): Performed by: FAMILY MEDICINE

## 2022-09-25 PROCEDURE — 94660 CPAP INITIATION&MGMT: CPT

## 2022-09-25 PROCEDURE — 6370000000 HC RX 637 (ALT 250 FOR IP)

## 2022-09-25 PROCEDURE — 2140000000 HC CCU INTERMEDIATE R&B

## 2022-09-25 RX ORDER — FUROSEMIDE 20 MG/1
20 TABLET ORAL
Status: DISCONTINUED | OUTPATIENT
Start: 2022-09-26 | End: 2022-09-27 | Stop reason: HOSPADM

## 2022-09-25 RX ADMIN — METOPROLOL SUCCINATE 100 MG: 100 TABLET, FILM COATED, EXTENDED RELEASE ORAL at 10:00

## 2022-09-25 RX ADMIN — ESCITALOPRAM OXALATE 10 MG: 10 TABLET ORAL at 20:35

## 2022-09-25 RX ADMIN — DILTIAZEM HYDROCHLORIDE 30 MG: 30 TABLET, FILM COATED ORAL at 20:35

## 2022-09-25 RX ADMIN — BUDESONIDE 250 MCG: 0.25 SUSPENSION RESPIRATORY (INHALATION) at 21:01

## 2022-09-25 RX ADMIN — ARFORMOTEROL TARTRATE 15 MCG: 15 SOLUTION RESPIRATORY (INHALATION) at 21:01

## 2022-09-25 RX ADMIN — ENOXAPARIN SODIUM 40 MG: 100 INJECTION SUBCUTANEOUS at 08:31

## 2022-09-25 RX ADMIN — DILTIAZEM HYDROCHLORIDE 30 MG: 30 TABLET, FILM COATED ORAL at 13:27

## 2022-09-25 RX ADMIN — Medication 10 ML: at 08:31

## 2022-09-25 RX ADMIN — FAMOTIDINE 20 MG: 20 TABLET ORAL at 08:31

## 2022-09-25 RX ADMIN — BUDESONIDE 250 MCG: 0.25 SUSPENSION RESPIRATORY (INHALATION) at 08:16

## 2022-09-25 RX ADMIN — OXYCODONE AND ACETAMINOPHEN 2 TABLET: 5; 325 TABLET ORAL at 20:35

## 2022-09-25 RX ADMIN — LEVOTHYROXINE SODIUM 75 MCG: 0.07 TABLET ORAL at 05:22

## 2022-09-25 RX ADMIN — DILTIAZEM HYDROCHLORIDE 30 MG: 30 TABLET, FILM COATED ORAL at 05:23

## 2022-09-25 RX ADMIN — PREDNISONE 5 MG: 5 TABLET ORAL at 08:31

## 2022-09-25 RX ADMIN — FERROUS SULFATE TAB 325 MG (65 MG ELEMENTAL FE) 325 MG: 325 (65 FE) TAB at 08:31

## 2022-09-25 RX ADMIN — GUAIFENESIN 400 MG: 400 TABLET ORAL at 08:31

## 2022-09-25 RX ADMIN — OXYCODONE AND ACETAMINOPHEN 2 TABLET: 5; 325 TABLET ORAL at 13:31

## 2022-09-25 RX ADMIN — ARFORMOTEROL TARTRATE 15 MCG: 15 SOLUTION RESPIRATORY (INHALATION) at 08:16

## 2022-09-25 RX ADMIN — FERROUS SULFATE TAB 325 MG (65 MG ELEMENTAL FE) 325 MG: 325 (65 FE) TAB at 18:13

## 2022-09-25 RX ADMIN — Medication 10 ML: at 20:36

## 2022-09-25 RX ADMIN — OXYCODONE AND ACETAMINOPHEN 2 TABLET: 5; 325 TABLET ORAL at 08:31

## 2022-09-25 RX ADMIN — GUAIFENESIN 400 MG: 400 TABLET ORAL at 20:35

## 2022-09-25 RX ADMIN — ESCITALOPRAM OXALATE 10 MG: 10 TABLET ORAL at 08:31

## 2022-09-25 RX ADMIN — DESMOPRESSIN ACETATE 200 MCG: 0.1 TABLET ORAL at 20:36

## 2022-09-25 RX ADMIN — GUAIFENESIN 400 MG: 400 TABLET ORAL at 13:28

## 2022-09-25 ASSESSMENT — PAIN DESCRIPTION - DESCRIPTORS: DESCRIPTORS: ACHING;DISCOMFORT;STABBING;THROBBING

## 2022-09-25 ASSESSMENT — PAIN SCALES - GENERAL
PAINLEVEL_OUTOF10: 10
PAINLEVEL_OUTOF10: 0
PAINLEVEL_OUTOF10: 10
PAINLEVEL_OUTOF10: 10
PAINLEVEL_OUTOF10: 0

## 2022-09-25 ASSESSMENT — PAIN DESCRIPTION - LOCATION
LOCATION: GENERALIZED
LOCATION: ABDOMEN

## 2022-09-25 ASSESSMENT — PAIN DESCRIPTION - ONSET: ONSET: ON-GOING

## 2022-09-25 ASSESSMENT — PAIN DESCRIPTION - ORIENTATION
ORIENTATION: MID
ORIENTATION: MID;LOWER

## 2022-09-25 ASSESSMENT — PAIN DESCRIPTION - PAIN TYPE: TYPE: ACUTE PAIN

## 2022-09-25 ASSESSMENT — PAIN DESCRIPTION - FREQUENCY: FREQUENCY: CONTINUOUS

## 2022-09-25 NOTE — PROGRESS NOTES
Hospitalist Progress Note      Synopsis: Patient admitted on 9/12/2022   Ms. Ramakrishna Patel, a 73y.o. year old female  who  has a past medical history of A-fib ([address] Able to transfer from chair to wheelchair, Abnormal mammogram, Acute on chronic respiratory failure ([address] Anemia, Anemia due to chronic illness, Ankle fracture, left, 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[address] Backache, Benign hypertension, CHF (congestive heart failure) ([address] Chronic back pain, Chronic kidney disease, Chronic pain disorder, Chronic, continuous use of opioids, Compression fracture of thoracic vertebra ([address] COPD (chronic obstructive pulmonary disease) ([address] Debility, Deformity of ankle and foot, acquired, Depression, Diabetes insipidus ([address] Diverticulitis, Dry eyes, Encephalopathy acute, Gait disturbance, GERD (gastroesophageal reflux disease), Hemorrhoids, Hernia, History of blood transfusion, History of Clostridium difficile, Hyperlipidemia, Hypothyroidism, Ischemic colitis, enteritis, or enterocolitis ([address] Long term (current) use of systemic steroids, Long-term current use of steroids, Lumbar disc herniation, Myalgia and myositis, unspecified, Nephrosclerosis, Nonunion of fracture, Osteoarthritis, PAF (paroxysmal atrial fibrillation) ([address] 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[address] Pulmonary hypertension ([address] Pulmonary sarcoidosis ([address] Rectal bleeding, Rhabdomyolysis, Steroid-induced avascular necrosis of shoulder ([address] Tibia fracture, and Ventral hernia without obstruction or gangrene. Patient presented to the emergency department with abdominal pain and bright red blood per rectum. ER physician noted bright red streak of blood on rectal exam.  Vital signs within normal limits and stable. Laboratory studies demonstrate BUN 20, creatinine 2.0, hemoglobin 10.1. Urinalysis possible urinary tract infection. Patient given a dose of ceftriaxone. Given IV fluids for acute renal failure.   Medicine consulted for admission. Patient has been following for her hemoglobin. She remains on DDAVP for sarcoidosis  \  Subjective    She is on complaining. She states that she needs a dressing changed on her abdomen  September 25  She did use her noninvasive ventilation. Did not have any new complaints today  Exam:  /89   Pulse 88   Temp 97.7 °F (36.5 °C) (Axillary)   Resp 22   Ht 5' (1.524 m)   Wt 184 lb 3.2 oz (83.6 kg)   LMP  (LMP Unknown)   SpO2 98%   BMI 35.97 kg/m²   General appearance: No apparent distress, appears stated age and cooperative. HEENT: Pupils equal, round, and reactive to light. Conjunctivae/corneas clear. Neck: Trachea midline. Respiratory: Decreased  Cardiovascular: Regular rate and rhythm with normal S1/S2 without murmurs, rubs or gallops. Abdomen: Soft, non-tender, non-distended with normal bowel sounds.   Abdominal dressing in place over abdominal area  Musculoskeletal: No clubbing, cyanosis Skin:  No rashes    Neurologic: awake, alert and following commands   Eyes are slow to open but she does respond well    Medications:  Reviewed    Infusion Medications    dextrose      sodium chloride       Scheduled Medications    hydrocortisone   Rectal BID    desmopressin  200 mcg Oral Nightly    [Held by provider] furosemide  40 mg IntraVENous BID    guaiFENesin  400 mg Oral TID    dilTIAZem  30 mg Oral TID    escitalopram  10 mg Oral BID    famotidine  20 mg Oral Daily    ferrous sulfate  325 mg Oral BID WC    levothyroxine  75 mcg Oral QAM AC    metoprolol succinate  100 mg Oral Daily    predniSONE  5 mg Oral Daily    budesonide  0.25 mg Nebulization BID    And    Arformoterol Tartrate  15 mcg Nebulization BID    enoxaparin  40 mg SubCUTAneous Daily    sodium chloride flush  10 mL IntraVENous 2 times per day     PRN Meds: perflutren lipid microspheres, white petrolatum, bismuth subsalicylate, potassium chloride **OR** potassium alternative oral replacement **OR** potassium chloride, glucose, dextrose bolus **OR** dextrose bolus, glucagon (rDNA), dextrose, promethazine-codeine, loperamide, iopamidol, oxyCODONE-acetaminophen, albuterol, sodium chloride flush, sodium chloride, [DISCONTINUED] promethazine **OR** ondansetron, polyethylene glycol, acetaminophen **OR** acetaminophen    I/O    Intake/Output Summary (Last 24 hours) at 9/25/2022 0909  Last data filed at 9/25/2022 0831  Gross per 24 hour   Intake 10 ml   Output 375 ml   Net -365 ml         Labs:   Recent Labs     09/23/22 0521 09/24/22 0527 09/25/22 0612   WBC 4.7 5.5 5.0   HGB 9.5* 9.6* 9.2*   HCT 33.9* 33.1* 31.2*    226 187         Recent Labs     09/23/22 0521 09/24/22 0527 09/25/22 0612    140 138   K 4.8 4.1 4.1   CL 99 101 99   CO2 29 31* 32*   BUN 12 17 18   CREATININE 0.9 0.8 0.9   CALCIUM 9.2 9.4 9.1         No results for input(s): PROT, ALB, ALKPHOS, ALT, AST, BILITOT, AMYLASE, LIPASE in the last 72 hours. No results for input(s): INR in the last 72 hours. No results for input(s): Emma Gazella in the last 72 hours. Chronic labs:  Lab Results   Component Value Date    CHOL 229 (H) 11/04/2021    TRIG 70 11/04/2021    HDL 88 11/04/2021    LDLCALC 127 (H) 11/04/2021    TSH 0.041 (L) 07/16/2022    INR 1.0 03/11/2022    LABA1C 5.3 11/20/2013       Microbiology:  Pending  No results for input(s): BC in the last 72 hours. No results for input(s): ORG in the last 72 hours. No results for input(s): Marinus Risk in the last 72 hours. No results for input(s): STREPNEUMAGU in the last 72 hours. No results for input(s): LP1UAG in the last 72 hours. No results for input(s): ASO in the last 72 hours. No results for input(s): CULTRESP in the last 72 hours. No results for input(s): PROCAL in the last 72 hours. Radiology:  XR CHEST (2 VW)    Result Date: 9/16/2022  Cardiomegaly with moderate interstitial pulmonary edema.      CT ABDOMEN PELVIS W IV CONTRAST Additional Contrast? Radiologist Recommendation    Result Date: 9/15/2022  1. Gas fluid levels within the colon may reflect a diarrheal process. 2.  Postop changes in the abdomen as described. No evidence of bowel obstruction. No free air or free fluid. 3.  Stable lung base opacities and chronic interstitial lung disease. CT ABDOMEN PELVIS W IV CONTRAST Additional Contrast? None    Result Date: 9/10/2022  1. Stable ventral abdominal wall hernia. 2. No bowel obstruction, free air, or free fluid. 3. Diverticulosis 4. Cholelithiasis 5. Redemonstration of opacities in lung bases suggestive of subsegmental atelectasis and chronic interstitial lung disease. XR CHEST PORTABLE    Result Date: 9/20/2022  No acute process. Stable cardiomegaly. XR CHEST PORTABLE    Result Date: 9/18/2022  Stable heart size with mild pulmonary vascular congestive changes. ASSESSMENT:    Principal Problem:    BRBPR (bright red blood per rectum)  Resolved Problems:    * No resolved hospital problems. *  Ventral abdominal wall hernia  Central diabetes insipidus  Adrenal insufficiency  Pulmonary hypertension  Heart failure with preserved ejection fraction  PLAN:    1. She is tolerating her medications pretty well  2. Awaiting discharge planning  3. Maintaining pulmonary consult for severe pulmonary hypertension and severe COPD by Gold classification  4. Valvular disease known. 5.  Monitor blood sugars though she does not have any long-acting insulin    6. History of A. fib maintain medications      September 25  Appears to be otherwise well and clinically stable. Awaiting placement. Slightly low blood sugar this morning and she is not on any oral hypoglycemic or insulin. Otherwise labs indicate stability  Lasix has been on hold since the 17th  Could probably resume a lower dose 3 times a week which is what she takes at home        Diet: ADULT DIET; Regular; Low Sodium (2 gm)  ADULT ORAL NUTRITION SUPPLEMENT; Breakfast, Dinner;  Wound Healing Oral Supplement  ADULT ORAL NUTRITION SUPPLEMENT; Lunch; Low Calorie/High Protein Oral Supplement  Code Status: Full Code  PT/OT Eval Status: In place  DVT Prophylaxis:   In place  Recommended disposition at discharge: Awaiting discharge planning to skilled facility    +++++++++++++++++++++++++++++++++++++++++++++++++  Ling Rodriguez MD   [facility].  +++++++++++++++++++++++++++++++++++++++++++++++++  NOTE: This report was transcribed using voice recognition software. Every effort was made to ensure accuracy; however, inadvertent computerized transcription errors may be present.

## 2022-09-25 NOTE — PROGRESS NOTES
Department of Internal Medicine  Nephrology Progress Note    Events reviewed. SUBJECTIVE:  We are following Mrs. Edgar Ross for IRMA. She denies any complaints.      PHYSICAL EXAM:      Vitals:    VITALS:  /89   Pulse 88   Temp 97.7 °F (36.5 °C) (Axillary)   Resp 22   Ht 5' (1.524 m)   Wt 184 lb 3.2 oz (83.6 kg)   LMP  (LMP Unknown)   SpO2 98%   BMI 35.97 kg/m²   24HR INTAKE/OUTPUT:    Intake/Output Summary (Last 24 hours) at 9/25/2022 1106  Last data filed at 9/25/2022 0831  Gross per 24 hour   Intake 10 ml   Output 375 ml   Net -365 ml       Constitutional: Alert and oriented  HEENT:  PERRLA  Respiratory:  CTA  Cardiovascular/Edema:  Normal S1/S2, RRR, no murmur, no edema  Gastrointestinal:  Soft, non-distended  Neurologic:  non-focal  Skin:  No rashes, lesions     Scheduled Meds:   [START ON 9/26/2022] furosemide  20 mg Oral Q MWF    hydrocortisone   Rectal BID    desmopressin  200 mcg Oral Nightly    guaiFENesin  400 mg Oral TID    dilTIAZem  30 mg Oral TID    escitalopram  10 mg Oral BID    famotidine  20 mg Oral Daily    ferrous sulfate  325 mg Oral BID WC    levothyroxine  75 mcg Oral QAM AC    metoprolol succinate  100 mg Oral Daily    predniSONE  5 mg Oral Daily    budesonide  0.25 mg Nebulization BID    And    Arformoterol Tartrate  15 mcg Nebulization BID    enoxaparin  40 mg SubCUTAneous Daily    sodium chloride flush  10 mL IntraVENous 2 times per day     Continuous Infusions:   dextrose      sodium chloride       PRN Meds:.perflutren lipid microspheres, white petrolatum, bismuth subsalicylate, potassium chloride **OR** potassium alternative oral replacement **OR** potassium chloride, glucose, dextrose bolus **OR** dextrose bolus, glucagon (rDNA), dextrose, promethazine-codeine, loperamide, iopamidol, oxyCODONE-acetaminophen, albuterol, sodium chloride flush, sodium chloride, [DISCONTINUED] promethazine **OR** ondansetron, polyethylene glycol, acetaminophen **OR** acetaminophen    DATA:    CBC with Differential:    Lab Results   Component Value Date/Time    WBC 5.0 09/25/2022 06:12 AM    RBC 3.61 09/25/2022 06:12 AM    RBC 3.57 12/14/2021 09:57 AM    HGB 9.2 09/25/2022 06:12 AM    HCT 31.2 09/25/2022 06:12 AM     09/25/2022 06:12 AM    MCV 86.4 09/25/2022 06:12 AM    MCH 25.5 09/25/2022 06:12 AM    MCHC 29.5 09/25/2022 06:12 AM    RDW 16.9 09/25/2022 06:12 AM    NRBC 0.9 09/25/2022 06:12 AM    SEGSPCT 77 01/15/2014 04:00 PM    BANDSPCT 1 06/25/2014 05:45 AM    METASPCT 0.9 09/21/2022 07:23 AM    LYMPHOPCT 10.4 09/25/2022 06:12 AM    LYMPHOPCT 14.5 12/14/2021 09:57 AM    PROMYELOPCT 0.9 09/10/2022 01:23 PM    MONOPCT 5.2 09/25/2022 06:12 AM    MYELOPCT 0.9 03/01/2021 04:39 PM    BASOPCT 0.4 09/25/2022 06:12 AM    MONOSABS 0.25 09/25/2022 06:12 AM    LYMPHSABS 0.50 09/25/2022 06:12 AM    EOSABS 0.31 09/25/2022 06:12 AM    BASOSABS 0.00 09/25/2022 06:12 AM     CMP:    Lab Results   Component Value Date/Time     09/25/2022 06:12 AM    K 4.1 09/25/2022 06:12 AM    K 4.1 09/19/2022 04:53 AM    CL 99 09/25/2022 06:12 AM    CO2 32 09/25/2022 06:12 AM    BUN 18 09/25/2022 06:12 AM    CREATININE 0.9 09/25/2022 06:12 AM    GFRAA >60 09/25/2022 06:12 AM    LABGLOM >60 09/25/2022 06:12 AM    GLUCOSE 66 09/25/2022 06:12 AM    GLUCOSE 116 05/02/2012 07:40 PM    PROT 5.4 09/13/2022 06:05 AM    LABALBU 2.9 09/13/2022 06:05 AM    LABALBU 3.6 05/02/2012 07:40 PM    CALCIUM 9.1 09/25/2022 06:12 AM    BILITOT 0.3 09/13/2022 06:05 AM    ALKPHOS 61 09/13/2022 06:05 AM    AST 17 09/13/2022 06:05 AM    ALT 8 09/13/2022 06:05 AM     Magnesium:    Lab Results   Component Value Date/Time    MG 2.4 09/16/2022 05:08 AM     Phosphorus:    Lab Results   Component Value Date/Time    PHOS 3.4 03/01/2021 04:39 PM     Radiology Review:        CT Abd/Pelvis 9/15/22   1. Gas fluid levels within the colon may reflect a diarrheal process. 2.  Postop changes in the abdomen as described.   No evidence of bowel   obstruction. No free air or free fluid. 3.  Stable lung base opacities and chronic interstitial lung disease. CXR 9/20/2022   No acute process. Stable cardiomegaly. BRIEF SUMMARY OF INITIAL CONSULT:    Mrs. Gentry Heimlich is a 73 y.o. 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 On September 12 2022, patient presented to the ED again for rectal bleeding. Labs were significant for chloride 94, bicarbonate 30, creatinine 2.0. Renal function improved with IVF administration, creatinine went down to 0.9 mg/dL on September 14. Since then his creatinine level has progressively increased up to 1.9 mg/dL, reason for this consultation. Review of her records showed that on September 14 she was started on furosemide and the following day on September 15 she had a CT abdomen and pelvis with IV contrast.    Problems resolved: IRMA stage II, likely volume responsive prerenal IRMA versus ATN 2/2 contrast induced IRMA in the setting of diuretic administration. Patient received IV contrast on 9/10/22 with recovery of renal function and received contrast again on 9/15/22. Renal function improved with IV fluid administration. Resolved.    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 HTN, on metoprolol  HFpEF 60%, on metoprolol, Echo pending, Lasix on hold.  Pro-BNP 13,419 >>8,729> 5,184>>6,780  ---------------------------------------------------------------  Pulmonary HTN  PAF, on diltiazem and metoprolol  Hypothyroidism, probably secondary  Anemia, normocytic     Plan:    Continue prednisone 5 mg PO daily   Continue diltiazem 30 mg p.o. 3 times daily  Continue metoprolol succinate 100 mg daily  Continue to hold Lasix for now  Continue DDAVP 200 mcg nightly  OK to discharge from renal point of view  Follow-up in our office in 1 month  BMP in 3 weeks      Electronically signed by FRIDA Gandhi CNP on 9/25/2022 at 11:06 AM

## 2022-09-25 NOTE — PROGRESS NOTES
Date: 09/24/2022    Time: 11:54    Patient Placed On BIPAP/CPAP/ Non-Invasive Ventilation? YES    If no must comment. Facial area red/color change? No             If YES are Blister/Lesion present? No   If yes must notify nursing staff    BIPAP/CPAP skin barrier?   Yes      Skin barrier type:mepilexlite       Comments:     09/24/22 9099   NIV Type   Skin Assessment Clean, dry, & intact   Skin Protection for O2 Device Yes   Orientation Middle   Location Nose   Intervention(s) Skin Barrier   NIV Started/Stopped On   Equipment Type V60   Mode AVAPS   Mask Type Full face mask   Settings/Measurements   PIP Observed 29 cm H20   CPAP/EPAP 8 cmH2O   IPAP Min 22 cmH2O   IPAP Max 30 cmH2O   Vt (Set, mL) 450 mL   Vt (Measured) 318 mL   Rate Ordered 20   Resp 27   Insp Rise Time (%) 2 %   FiO2  50 %   I Time/ I Time % 0.9 s   Minute Volume (L/min) 8.4 Liters   Mask Leak (lpm) 37 lpm   Comfort Level Fair   Using Accessory Muscles No   SpO2 100         HUMBERTO DiazP

## 2022-09-25 NOTE — PROGRESS NOTES
Patient placed on AVAPS for the night.       09/24/22 1955   NIV Type   Skin Assessment Clean, dry, & intact   Skin Protection for O2 Device Yes   Orientation Middle   Location Nose   Intervention(s) Skin Barrier   NIV Started/Stopped On   Equipment Type V60   Mode AVAPS   Mask Type Full face mask   Mask Size Small   Settings/Measurements   PIP Observed 24 cm H20   CPAP/EPAP 8 cmH2O   IPAP Min 22 cmH2O   IPAP Max 30 cmH2O   Vt (Set, mL) 450 mL   Vt (Measured) 499 mL   Rate Ordered 20   Resp 24   Insp Rise Time (%) 2 %   FiO2  50 %   I Time/ I Time % 0.9 s   Minute Volume (L/min) 11.4 Liters   Mask Leak (lpm) 46 lpm   Comfort Level Good   Using Accessory Muscles No

## 2022-09-26 ENCOUNTER — APPOINTMENT (OUTPATIENT)
Dept: GENERAL RADIOLOGY | Age: 67
DRG: 682 | End: 2022-09-26
Payer: MEDICARE

## 2022-09-26 LAB
ANION GAP SERPL CALCULATED.3IONS-SCNC: 9 MMOL/L (ref 7–16)
ANISOCYTOSIS: ABNORMAL
BASOPHILIC STIPPLING: ABNORMAL
BASOPHILS ABSOLUTE: 0.01 E9/L (ref 0–0.2)
BASOPHILS RELATIVE PERCENT: 0.2 % (ref 0–2)
BUN BLDV-MCNC: 18 MG/DL (ref 6–23)
CALCIUM SERPL-MCNC: 9.1 MG/DL (ref 8.6–10.2)
CHLORIDE BLD-SCNC: 98 MMOL/L (ref 98–107)
CO2: 29 MMOL/L (ref 22–29)
CREAT SERPL-MCNC: 0.9 MG/DL (ref 0.5–1)
EOSINOPHILS ABSOLUTE: 0.31 E9/L (ref 0.05–0.5)
EOSINOPHILS RELATIVE PERCENT: 6.7 % (ref 0–6)
GFR AFRICAN AMERICAN: >60
GFR NON-AFRICAN AMERICAN: >60 ML/MIN/1.73
GLUCOSE BLD-MCNC: 80 MG/DL (ref 74–99)
HCT VFR BLD CALC: 31.6 % (ref 34–48)
HEMOGLOBIN: 9.2 G/DL (ref 11.5–15.5)
HYPOCHROMIA: ABNORMAL
IMMATURE GRANULOCYTES #: 0.01 E9/L
IMMATURE GRANULOCYTES %: 0.2 % (ref 0–5)
LYMPHOCYTES ABSOLUTE: 0.44 E9/L (ref 1.5–4)
LYMPHOCYTES RELATIVE PERCENT: 9.5 % (ref 20–42)
MCH RBC QN AUTO: 24.7 PG (ref 26–35)
MCHC RBC AUTO-ENTMCNC: 29.1 % (ref 32–34.5)
MCV RBC AUTO: 84.9 FL (ref 80–99.9)
MONOCYTES ABSOLUTE: 0.6 E9/L (ref 0.1–0.95)
MONOCYTES RELATIVE PERCENT: 13 % (ref 2–12)
NEUTROPHILS ABSOLUTE: 3.25 E9/L (ref 1.8–7.3)
NEUTROPHILS RELATIVE PERCENT: 70.4 % (ref 43–80)
OVALOCYTES: ABNORMAL
PDW BLD-RTO: 16.7 FL (ref 11.5–15)
PLATELET # BLD: 180 E9/L (ref 130–450)
PMV BLD AUTO: 11.3 FL (ref 7–12)
POIKILOCYTES: ABNORMAL
POLYCHROMASIA: ABNORMAL
POTASSIUM SERPL-SCNC: 4.4 MMOL/L (ref 3.5–5)
RBC # BLD: 3.72 E12/L (ref 3.5–5.5)
SODIUM BLD-SCNC: 136 MMOL/L (ref 132–146)
TEAR DROP CELLS: ABNORMAL
TOXIC GRANULATION: ABNORMAL
VACUOLATED NEUTROPHILS: ABNORMAL
WBC # BLD: 4.6 E9/L (ref 4.5–11.5)

## 2022-09-26 PROCEDURE — 36415 COLL VENOUS BLD VENIPUNCTURE: CPT

## 2022-09-26 PROCEDURE — 94660 CPAP INITIATION&MGMT: CPT

## 2022-09-26 PROCEDURE — 6370000000 HC RX 637 (ALT 250 FOR IP): Performed by: INTERNAL MEDICINE

## 2022-09-26 PROCEDURE — 94640 AIRWAY INHALATION TREATMENT: CPT

## 2022-09-26 PROCEDURE — 6370000000 HC RX 637 (ALT 250 FOR IP): Performed by: FAMILY MEDICINE

## 2022-09-26 PROCEDURE — 71045 X-RAY EXAM CHEST 1 VIEW: CPT

## 2022-09-26 PROCEDURE — 2580000003 HC RX 258: Performed by: FAMILY MEDICINE

## 2022-09-26 PROCEDURE — 85025 COMPLETE CBC W/AUTO DIFF WBC: CPT

## 2022-09-26 PROCEDURE — 6370000000 HC RX 637 (ALT 250 FOR IP)

## 2022-09-26 PROCEDURE — 2140000000 HC CCU INTERMEDIATE R&B

## 2022-09-26 PROCEDURE — 2700000000 HC OXYGEN THERAPY PER DAY

## 2022-09-26 PROCEDURE — 6360000002 HC RX W HCPCS: Performed by: FAMILY MEDICINE

## 2022-09-26 PROCEDURE — 6370000000 HC RX 637 (ALT 250 FOR IP): Performed by: STUDENT IN AN ORGANIZED HEALTH CARE EDUCATION/TRAINING PROGRAM

## 2022-09-26 PROCEDURE — 99233 SBSQ HOSP IP/OBS HIGH 50: CPT

## 2022-09-26 PROCEDURE — 80048 BASIC METABOLIC PNL TOTAL CA: CPT

## 2022-09-26 RX ADMIN — ARFORMOTEROL TARTRATE 15 MCG: 15 SOLUTION RESPIRATORY (INHALATION) at 19:15

## 2022-09-26 RX ADMIN — BUDESONIDE 250 MCG: 0.25 SUSPENSION RESPIRATORY (INHALATION) at 08:14

## 2022-09-26 RX ADMIN — ESCITALOPRAM OXALATE 10 MG: 10 TABLET ORAL at 09:38

## 2022-09-26 RX ADMIN — BUDESONIDE 250 MCG: 0.25 SUSPENSION RESPIRATORY (INHALATION) at 19:15

## 2022-09-26 RX ADMIN — GUAIFENESIN 400 MG: 400 TABLET ORAL at 09:39

## 2022-09-26 RX ADMIN — FUROSEMIDE 20 MG: 20 TABLET ORAL at 09:51

## 2022-09-26 RX ADMIN — ESCITALOPRAM OXALATE 10 MG: 10 TABLET ORAL at 22:35

## 2022-09-26 RX ADMIN — ENOXAPARIN SODIUM 40 MG: 100 INJECTION SUBCUTANEOUS at 09:40

## 2022-09-26 RX ADMIN — Medication 10 ML: at 22:36

## 2022-09-26 RX ADMIN — DESMOPRESSIN ACETATE 200 MCG: 0.1 TABLET ORAL at 22:35

## 2022-09-26 RX ADMIN — GUAIFENESIN 400 MG: 400 TABLET ORAL at 15:47

## 2022-09-26 RX ADMIN — GUAIFENESIN 400 MG: 400 TABLET ORAL at 22:35

## 2022-09-26 RX ADMIN — FERROUS SULFATE TAB 325 MG (65 MG ELEMENTAL FE) 325 MG: 325 (65 FE) TAB at 17:07

## 2022-09-26 RX ADMIN — PREDNISONE 5 MG: 5 TABLET ORAL at 09:38

## 2022-09-26 RX ADMIN — METOPROLOL SUCCINATE 100 MG: 100 TABLET, FILM COATED, EXTENDED RELEASE ORAL at 09:38

## 2022-09-26 RX ADMIN — Medication 10 ML: at 09:41

## 2022-09-26 RX ADMIN — DILTIAZEM HYDROCHLORIDE 30 MG: 30 TABLET, FILM COATED ORAL at 11:57

## 2022-09-26 RX ADMIN — ALBUTEROL SULFATE 2.5 MG: 2.5 SOLUTION RESPIRATORY (INHALATION) at 08:14

## 2022-09-26 RX ADMIN — FERROUS SULFATE TAB 325 MG (65 MG ELEMENTAL FE) 325 MG: 325 (65 FE) TAB at 09:38

## 2022-09-26 RX ADMIN — LEVOTHYROXINE SODIUM 75 MCG: 0.07 TABLET ORAL at 05:57

## 2022-09-26 RX ADMIN — DILTIAZEM HYDROCHLORIDE 30 MG: 30 TABLET, FILM COATED ORAL at 22:35

## 2022-09-26 RX ADMIN — OXYCODONE AND ACETAMINOPHEN 2 TABLET: 5; 325 TABLET ORAL at 09:39

## 2022-09-26 RX ADMIN — ARFORMOTEROL TARTRATE 15 MCG: 15 SOLUTION RESPIRATORY (INHALATION) at 08:14

## 2022-09-26 RX ADMIN — DILTIAZEM HYDROCHLORIDE 30 MG: 30 TABLET, FILM COATED ORAL at 05:57

## 2022-09-26 RX ADMIN — FAMOTIDINE 20 MG: 20 TABLET ORAL at 09:38

## 2022-09-26 ASSESSMENT — PAIN SCALES - GENERAL
PAINLEVEL_OUTOF10: 9
PAINLEVEL_OUTOF10: 0

## 2022-09-26 ASSESSMENT — PAIN DESCRIPTION - DESCRIPTORS: DESCRIPTORS: ACHING;SORE

## 2022-09-26 ASSESSMENT — PAIN DESCRIPTION - LOCATION: LOCATION: BACK;SHOULDER

## 2022-09-26 NOTE — PROGRESS NOTES
Hospitalist Progress Note      Synopsis: Patient admitted on 9/12/2022   Ms. Chaim Manzanares, a 73y.o. year old female  who  has a past medical history of A-fib ([address] Able to transfer from chair to wheelchair, Abnormal mammogram, Acute on chronic respiratory failure ([address] Anemia, Anemia due to chronic illness, Ankle fracture, left, 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[address] Backache, Benign hypertension, CHF (congestive heart failure) ([address] Chronic back pain, Chronic kidney disease, Chronic pain disorder, Chronic, continuous use of opioids, Compression fracture of thoracic vertebra ([address] COPD (chronic obstructive pulmonary disease) ([address] Debility, Deformity of ankle and foot, acquired, Depression, Diabetes insipidus ([address] Diverticulitis, Dry eyes, Encephalopathy acute, Gait disturbance, GERD (gastroesophageal reflux disease), Hemorrhoids, Hernia, History of blood transfusion, History of Clostridium difficile, Hyperlipidemia, Hypothyroidism, Ischemic colitis, enteritis, or enterocolitis ([address] Long term (current) use of systemic steroids, Long-term current use of steroids, Lumbar disc herniation, Myalgia and myositis, unspecified, Nephrosclerosis, Nonunion of fracture, Osteoarthritis, PAF (paroxysmal atrial fibrillation) ([address] 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[address] Pulmonary hypertension ([address] Pulmonary sarcoidosis ([address] Rectal bleeding, Rhabdomyolysis, Steroid-induced avascular necrosis of shoulder ([address] Tibia fracture, and Ventral hernia without obstruction or gangrene. Patient presented to the emergency department with abdominal pain and bright red blood per rectum. ER physician noted bright red streak of blood on rectal exam.  Vital signs within normal limits and stable. Laboratory studies demonstrate BUN 20, creatinine 2.0, hemoglobin 10.1. Urinalysis possible urinary tract infection. Patient given a dose of ceftriaxone. Given IV fluids for acute renal failure.   Medicine consulted for admission. Patient has been following for her hemoglobin. She remains on DDAVP for sarcoidosis  \  Subjective    Patient is awake and slightly confused  Denies new issues  Exam:  BP (!) 136/94   Pulse 74   Temp 97.5 °F (36.4 °C) (Axillary)   Resp 22   Ht 5' (1.524 m)   Wt 184 lb (83.5 kg)   LMP  (LMP Unknown)   SpO2 93%   BMI 35.94 kg/m²   General appearance: No apparent distress, appears stated age and cooperative. HEENT: Pupils equal, round, and reactive to light. Conjunctivae/corneas clear. Neck: Trachea midline. Respiratory: Decreased  Cardiovascular: Regular rate and rhythm with normal S1/S2 without murmurs, rubs or gallops. Abdomen: Soft, non-tender, non-distended with normal bowel sounds.   Abdominal dressing in place over abdominal area  Musculoskeletal: No clubbing, cyanosis Skin:  No rashes    Neurologic: awake, alert and following commands   Eyes are slow to open but she does respond well    Medications:  Reviewed    Infusion Medications    dextrose      sodium chloride       Scheduled Medications    furosemide  20 mg Oral Q MWF    hydrocortisone   Rectal BID    desmopressin  200 mcg Oral Nightly    guaiFENesin  400 mg Oral TID    dilTIAZem  30 mg Oral TID    escitalopram  10 mg Oral BID    famotidine  20 mg Oral Daily    ferrous sulfate  325 mg Oral BID WC    levothyroxine  75 mcg Oral QAM AC    metoprolol succinate  100 mg Oral Daily    predniSONE  5 mg Oral Daily    budesonide  0.25 mg Nebulization BID    And    Arformoterol Tartrate  15 mcg Nebulization BID    enoxaparin  40 mg SubCUTAneous Daily    sodium chloride flush  10 mL IntraVENous 2 times per day     PRN Meds: perflutren lipid microspheres, white petrolatum, bismuth subsalicylate, potassium chloride **OR** potassium alternative oral replacement **OR** potassium chloride, glucose, dextrose bolus **OR** dextrose bolus, glucagon (rDNA), dextrose, promethazine-codeine, loperamide, iopamidol, oxyCODONE-acetaminophen, albuterol, sodium chloride flush, sodium chloride, [DISCONTINUED] promethazine **OR** ondansetron, polyethylene glycol, acetaminophen **OR** acetaminophen    I/O    Intake/Output Summary (Last 24 hours) at 9/26/2022 1428  Last data filed at 9/25/2022 2226  Gross per 24 hour   Intake --   Output 450 ml   Net -450 ml       Labs:   Recent Labs     09/24/22  0527 09/25/22  0612 09/26/22  0521   WBC 5.5 5.0 4.6   HGB 9.6* 9.2* 9.2*   HCT 33.1* 31.2* 31.6*    187 180       Recent Labs     09/24/22  0527 09/25/22  0612 09/26/22  0521    138 136   K 4.1 4.1 4.4    99 98   CO2 31* 32* 29   BUN 17 18 18   CREATININE 0.8 0.9 0.9   CALCIUM 9.4 9.1 9.1       No results for input(s): PROT, ALB, ALKPHOS, ALT, AST, BILITOT, AMYLASE, LIPASE in the last 72 hours. No results for input(s): INR in the last 72 hours. No results for input(s): Analilia Johns in the last 72 hours. Chronic labs:  Lab Results   Component Value Date    CHOL 229 (H) 11/04/2021    TRIG 70 11/04/2021    HDL 88 11/04/2021    LDLCALC 127 (H) 11/04/2021    TSH 0.041 (L) 07/16/2022    INR 1.0 03/11/2022    LABA1C 5.3 11/20/2013       Microbiology:  Pending  No results for input(s): BC in the last 72 hours. No results for input(s): ORG in the last 72 hours. No results for input(s): Volanda Gin in the last 72 hours. No results for input(s): STREPNEUMAGU in the last 72 hours. No results for input(s): LP1UAG in the last 72 hours. No results for input(s): ASO in the last 72 hours. No results for input(s): CULTRESP in the last 72 hours. No results for input(s): PROCAL in the last 72 hours. Radiology:  XR CHEST (2 VW)    Result Date: 9/16/2022  Cardiomegaly with moderate interstitial pulmonary edema. CT ABDOMEN PELVIS W IV CONTRAST Additional Contrast? Radiologist Recommendation    Result Date: 9/15/2022  1. Gas fluid levels within the colon may reflect a diarrheal process. 2.  Postop changes in the abdomen as described.   No evidence of bowel obstruction. No free air or free fluid. 3.  Stable lung base opacities and chronic interstitial lung disease. CT ABDOMEN PELVIS W IV CONTRAST Additional Contrast? None    Result Date: 9/10/2022  1. Stable ventral abdominal wall hernia. 2. No bowel obstruction, free air, or free fluid. 3. Diverticulosis 4. Cholelithiasis 5. Redemonstration of opacities in lung bases suggestive of subsegmental atelectasis and chronic interstitial lung disease. XR CHEST PORTABLE    Result Date: 9/20/2022  No acute process. Stable cardiomegaly. XR CHEST PORTABLE    Result Date: 9/18/2022  Stable heart size with mild pulmonary vascular congestive changes. ASSESSMENT:    Principal Problem:    BRBPR (bright red blood per rectum)  Resolved Problems:    * No resolved hospital problems. *  Ventral abdominal wall hernia  Central diabetes insipidus  Adrenal insufficiency  Pulmonary hypertension  Heart failure with preserved ejection fraction  PLAN:    1. She is tolerating her medications pretty well  2. Awaiting discharge planning  3. Maintaining pulmonary consult for severe pulmonary hypertension and severe COPD by Gold classification  4. Valvular disease known. 5.  Monitor blood sugars though she does not have any long-acting insulin    6. History of A. fib maintain medications      September 25  Appears to be otherwise well and clinically stable. Awaiting placement. Slightly low blood sugar this morning and she is not on any oral hypoglycemic or insulin. Otherwise labs indicate stability  Lasix has been on hold since the 17th  Could probably resume a lower dose 3 times a week which is what she takes at home    9/26  Subacute rehab transfer awaited  She is complaining of coughing and shortness of breath check chest x-ray      Diet: ADULT DIET; Regular; Low Sodium (2 gm)  ADULT ORAL NUTRITION SUPPLEMENT; Breakfast, Dinner; Wound Healing Oral Supplement  ADULT ORAL NUTRITION SUPPLEMENT; Lunch;  Low Calorie/High Protein Oral Supplement  Code Status: Full Code  PT/OT Eval Status: In place  DVT Prophylaxis:   In place  Recommended disposition at discharge: Plan at discharge to subacute rehab  +++++++++++++++++++++++++++++++++++++++++++++++++  Mine Mayberry MD   [facility].  +++++++++++++++++++++++++++++++++++++++++++++++++  NOTE: This report was transcribed using voice recognition software. Every effort was made to ensure accuracy; however, inadvertent computerized transcription errors may be present.

## 2022-09-26 NOTE — PROGRESS NOTES
Kwame Leslie M.D.,[facility]  Go Hung D.O., F.A.C.O.I., Osmany Gilbert M.D. Martir Wilson M.D. Hoa Quijano D.O. Daily Pulmonary Progress Note    Patient:  Rhea Jones 73 y.o. female MRN: [id number]     Date of Service: 9/26/2022      Synopsis     We are following patient for respiratory failure, sarcoidosis     \"CC\"  rectal bleeding    Code status: FULL       Subjective      Patient was seen and examined lying in bed in no apparent distress. She was napping with the AVAPS on. She refused last night. Currently on 5 L oxygen when not on AVAPS. Wheeze noted today. Review of Systems:  Denies shortness of breath, cough, chest pain,    24-hour events:  None     Objective   Vitals: BP (!) 136/94   Pulse 74   Temp 97.5 °F (36.4 °C) (Axillary)   Resp 22   Ht 5' (1.524 m)   Wt 184 lb (83.5 kg)   LMP  (LMP Unknown)   SpO2 93%   BMI 35.94 kg/m²     I/O:    Intake/Output Summary (Last 24 hours) at 9/26/2022 1234  Last data filed at 9/25/2022 2226  Gross per 24 hour   Intake --   Output 450 ml   Net -450 ml             CPAP/EPAP: 8 cmH2O     CURRENT MEDS :  Scheduled Meds:   furosemide  20 mg Oral Q MWF    hydrocortisone   Rectal BID    desmopressin  200 mcg Oral Nightly    guaiFENesin  400 mg Oral TID    dilTIAZem  30 mg Oral TID    escitalopram  10 mg Oral BID    famotidine  20 mg Oral Daily    ferrous sulfate  325 mg Oral BID WC    levothyroxine  75 mcg Oral QAM AC    metoprolol succinate  100 mg Oral Daily    predniSONE  5 mg Oral Daily    budesonide  0.25 mg Nebulization BID    And    Arformoterol Tartrate  15 mcg Nebulization BID    enoxaparin  40 mg SubCUTAneous Daily    sodium chloride flush  10 mL IntraVENous 2 times per day       Physical Exam:  General Appearance: appears comfortable in no acute distress.    HEENT: Normocephalic atraumatic without obvious abnormality   Neck: Lips, mucosa, and tongue normal. Supple, symmetrical, trachea midline, no adenopathy;thyroid: no enlargement/tenderness/nodules or JVD. Lung: Breath sounds wheeze. Respirations unlabored. Symmetrical expansion. Heart: RRR, normal S1, S2. No MRG  Abdomen: Soft, NT, ND. BS present x 4 quadrants. No bruit or organomegaly. Extremities: Pedal pulses 2+ symmetric b/l. Extremities normal, no cyanosis, clubbing, or edema. Musculokeletal: No joint swelling, no muscle tenderness. ROM normal in all joints of extremities. Neurologic: Mental status: Alert    Pertinent/ New Labs and Imaging Studies     Imaging Personally Reviewed:  CXR 9/20  No acute process. Stable cardiomegaly. Echo 7/16/22   Ejection fraction is visually estimated at 60%. No regional wall motion abnormalities seen. Moderate left ventricular concentric hypertrophy noted. Dilated right ventricle with reduced function. Left atrial volume index of 34 ml per meters squared BSA. Moderate mitral regurgitation is present. Moderate tricuspid regurgitation. Pulmonary hypertension is severe. RVSP is 70 mmHg.    No evidence for hemodynamically significant pericardial effusion      Labs:  Lab Results   Component Value Date/Time    WBC 4.6 09/26/2022 05:21 AM    HGB 9.2 09/26/2022 05:21 AM    HCT 31.6 09/26/2022 05:21 AM    MCV 84.9 09/26/2022 05:21 AM    MCH 24.7 09/26/2022 05:21 AM    MCHC 29.1 09/26/2022 05:21 AM    RDW 16.7 09/26/2022 05:21 AM     09/26/2022 05:21 AM    MPV 11.3 09/26/2022 05:21 AM     Lab Results   Component Value Date/Time     09/26/2022 05:21 AM    K 4.4 09/26/2022 05:21 AM    K 4.1 09/19/2022 04:53 AM    CL 98 09/26/2022 05:21 AM    CO2 29 09/26/2022 05:21 AM    BUN 18 09/26/2022 05:21 AM    CREATININE 0.9 09/26/2022 05:21 AM    LABALBU 2.9 09/13/2022 06:05 AM    LABALBU 3.6 05/02/2012 07:40 PM    CALCIUM 9.1 09/26/2022 05:21 AM    GFRAA >60 09/26/2022 05:21 AM    LABGLOM >60 09/26/2022 05:21 AM     Lab Results   Component Value Date/Time    PROTIME 11.0 03/11/2022 11:29 AM    PROTIME 12.7 05/02/2012 07:40 PM    INR 1.0 03/11/2022 11:29 AM     Recent Labs     09/24/22  0527   PROBNP 6,780*       No results for input(s): PROCAL in the last 72 hours. This SmartLink has not been configured with any valid records. Abg 9/16/22  7.22/72/330/29/0.6/99    Micro:  Nothing new      Assessment:    Acute on chronic respiratory failure with hypoxia and hypercapnia  GI bleed, Anemia  Pulmonary sarcoidosis  Restrictive lung disease  Obesity hypoventilation syndrome  LINDSAY  Chronic home O2 dependence 4 liters NC  Severe pulmonary hypertension WHO group 2, 3-chronic  Stage III severe COPD by Gold classification  Acute on chronic congestive heart failure with preserved EF  Moderate MR, Moderate TR  Chronic dyspnea  [place]'s disease  Diabetes mellitus II  Stage III chronic kidney disease  History of nicotine dependence  PAF  Obesity, BMI 35  Debility       Plan:   Wean oxygen as tolerated keep SpO2 between 88-92%, on 5L NC.    AVAPS HS and PRN-is much as patient tolerates  Bronchodilators: Brovana/Pulmicort twice daily, albuterol as needed  IRMA management per nephrology, holding Lasix, continue DDAVP  Chronic prednisone 5 mg daily  GI/DVT prophylaxis   PT, OT   Appreciate Palliative input. Patient wishes to remain FULL CODE despite the fact that most time she refuses her NIV     Plan of care reviewed in collaboration with Dr. Elliot Diaz  Electronically signed by FRIDA Limon - CNP on 9/26/2022 at 12:34 PM    I personally saw, examined, and cared for the patient. Labs, medications, radiographs reviewed. I agree with history exam and plans detailed in NP note.         Viv Loja DO

## 2022-09-26 NOTE — PROGRESS NOTES
Palliative Care Department  [phone]  Palliative Care Initial Consult  Provider Kelsey Duque, APRN - CNP      PATIENT: Bob Mcintosh  : 1955  MRN: [id number]  ADMISSION DATE: 2022  1:08 AM  Referring Provider: Yair Davey MD    Palliative Medicine was consulted on hospital day 14 for assistance with Goals of care, Code Status Discussion,     HPI:     Rajesh Juarez is a 73 y.o. y/o female with a history of A. fib, acute on chronic respiratory failure, anemia, left septic necrosis of head of humerus, hypertension, congestive heart failure, chronic back pain, chronic kidney disease, chronic pain disorder, compression fracture of the thoracic vertebrae, COPD, depression, diverticulitis, CHF, hypothyroidism, enterocolitis, myalgia and myositis, pulmonary hypertension, pulmonary sarcoidosis, rhabdomyolysis, who [facility] to [facility] on 2022 with abdominal pain and rectal bleed. During the hospital course, patient has been treated for suspected UTI, had an echo with a EF of 60% due to increased BNP, he was found to be in acute kidney injury due to dehydration, and diarrhea. Patient also has been having episode of severe hypoxia, in refusing to wear BiPAP at at bedtime. Patient wears 4 L of nasal cannula at baseline. Palliative medicine consulted to discuss goal of care, CODE STATUS discussion. ASSESSMENT/PLAN:     Pertinent Hospital Diagnoses     UTI  Respiratory failure  Acute kidney injury  Diarrhea    Palliative Care Encounter / Counseling Regarding Goals of Care  Please see detailed goals of care discussion as below  At this time, Bob Mcintosh, Does have capacity for medical decision-making.   Capacity is time limited and situation/question specific  Outcome of goals of care meeting:  Continue with current medical treatment  Patient wishes for CODE STATUS to remain a full code  Reports her  knows her wishes, and he is her POA  Code status Full Code  Advanced Directives: no POA or living will in epic  Surrogate/Legal NOK:  [address] [facility] [address] [facility] (Spouse) [phone]    Spiritual assessment: no spiritual distress identified  Bereavement and grief: to be determined  Referrals to: none today    Thank you for the opportunity to participate in the care of Errol Miller. FRIDA Mejia CNP  Palliative Medicine     SUBJECTIVE:     Details of Conversation:    Chart was reviewed. Patient had altered mental status this morning, hypoxic, now on AVAPS. Saw patient at the bedside, she was somnolent, arousable by voice, she was no willing to participate in a meaningful conversation. She still not ready to discuss goal of care and CODE STATUS. From previous conversation, patient reported that her  to her wishes, and she agreed to have been contacted.:  Spoke with patient  Matilde Harkins over the phone. We discussed goal of care and CODE STATUS, he reports that his wife has indeed expressed of wanting to have everything, and wanted to remain a full code. we discussed her not being cooperative with medical treatment, which goes against her wishes of wanting everything to be done. Matilde Harkins reported that he is coming to visit his wife, and he is going to try to speak with her about her goals. Comfort support provided. We will continue to follow.        Prognosis: Guarded    OBJECTIVE:     BP (!) 136/94   Pulse 74   Temp 97.5 °F (36.4 °C) (Axillary)   Resp 22   Ht 5' (1.524 m)   Wt 184 lb (83.5 kg)   LMP  (LMP Unknown)   SpO2 93%   BMI 35.94 kg/m²     Physical Examination:  Gen: elderly, thin, NAD, awake, alert, stoic  HEENT: normocephalic, atraumatic, PERRL, EOMI,   Neck: trachea midline, no JVD  Lungs: respirations easy and not labored,   Heart: regular rate and rhythm, distant heart tones,   Abdomen: normoactive bowel sounds, soft, non-tender  Extremities: edema, moving all extremities    Skin: warm, dry without rashes, lesions, bruising  Neuro: awake, alert, oriented x 3, follows commands, no gross neurologic deficit    Objective data reviewed: labs, images, records, medication use, vitals, and chart    Time/Communication  Greater than 50% of time spent, total 35 minutes in counseling and coordination of care at the bedside regarding  CODE STATUS discussion and goals of care. Thank you for allowing Palliative Medicine to participate in the care of Jessica Sandoval. Note: This report was completed using computerize voiced recognition software. Every effort has been made to ensure accuracy; however, inadvertent computerized transcription errors may be present.

## 2022-09-26 NOTE — PROGRESS NOTES
Date: 9/25/2022    Time: 9:20 PM    Patient Placed On BIPAP/CPAP/ Non-Invasive Ventilation? Yes    If no must comment. Facial area red/color change? No           If YES are Blister/Lesion present? No   If yes must notify nursing staff  BIPAP/CPAP skin barrier?   Yes    Skin barrier type:mepilexlite       Comments:        Jesi Sigala RCP

## 2022-09-26 NOTE — PROGRESS NOTES
Department of Internal Medicine  Nephrology Progress Note    Events reviewed. SUBJECTIVE:  We are following Mrs. Neena Hitchcock for IRMA. She states that she is tired today.     PHYSICAL EXAM:      Vitals:    VITALS:  BP (!) 136/94   Pulse 74   Temp 97.5 °F (36.4 °C) (Axillary)   Resp 22   Ht 5' (1.524 m)   Wt 184 lb (83.5 kg)   LMP  (LMP Unknown)   SpO2 93%   BMI 35.94 kg/m²   24HR INTAKE/OUTPUT:    Intake/Output Summary (Last 24 hours) at 9/26/2022 1237  Last data filed at 9/25/2022 2226  Gross per 24 hour   Intake --   Output 450 ml   Net -450 ml         Constitutional: Alert and oriented  HEENT:  PERRLA  Respiratory:  CTA  Cardiovascular/Edema:  Normal S1/S2, RRR, no murmur, no edema  Gastrointestinal:  Soft, non-distended  Neurologic:  non-focal  Skin:  No rashes, lesions     Scheduled Meds:   furosemide  20 mg Oral Q MWF    hydrocortisone   Rectal BID    desmopressin  200 mcg Oral Nightly    guaiFENesin  400 mg Oral TID    dilTIAZem  30 mg Oral TID    escitalopram  10 mg Oral BID    famotidine  20 mg Oral Daily    ferrous sulfate  325 mg Oral BID WC    levothyroxine  75 mcg Oral QAM AC    metoprolol succinate  100 mg Oral Daily    predniSONE  5 mg Oral Daily    budesonide  0.25 mg Nebulization BID    And    Arformoterol Tartrate  15 mcg Nebulization BID    enoxaparin  40 mg SubCUTAneous Daily    sodium chloride flush  10 mL IntraVENous 2 times per day     Continuous Infusions:   dextrose      sodium chloride       PRN Meds:.perflutren lipid microspheres, white petrolatum, bismuth subsalicylate, potassium chloride **OR** potassium alternative oral replacement **OR** potassium chloride, glucose, dextrose bolus **OR** dextrose bolus, glucagon (rDNA), dextrose, promethazine-codeine, loperamide, iopamidol, oxyCODONE-acetaminophen, albuterol, sodium chloride flush, sodium chloride, [DISCONTINUED] promethazine **OR** ondansetron, polyethylene glycol, acetaminophen **OR** acetaminophen    DATA:    CBC with Differential:    Lab Results   Component Value Date/Time    WBC 4.6 09/26/2022 05:21 AM    RBC 3.72 09/26/2022 05:21 AM    RBC 3.57 12/14/2021 09:57 AM    HGB 9.2 09/26/2022 05:21 AM    HCT 31.6 09/26/2022 05:21 AM     09/26/2022 05:21 AM    MCV 84.9 09/26/2022 05:21 AM    MCH 24.7 09/26/2022 05:21 AM    MCHC 29.1 09/26/2022 05:21 AM    RDW 16.7 09/26/2022 05:21 AM    NRBC 0.9 09/25/2022 06:12 AM    SEGSPCT 77 01/15/2014 04:00 PM    BANDSPCT 1 06/25/2014 05:45 AM    METASPCT 0.9 09/21/2022 07:23 AM    LYMPHOPCT 9.5 09/26/2022 05:21 AM    LYMPHOPCT 14.5 12/14/2021 09:57 AM    PROMYELOPCT 0.9 09/10/2022 01:23 PM    MONOPCT 13.0 09/26/2022 05:21 AM    MYELOPCT 0.9 03/01/2021 04:39 PM    BASOPCT 0.2 09/26/2022 05:21 AM    MONOSABS 0.60 09/26/2022 05:21 AM    LYMPHSABS 0.44 09/26/2022 05:21 AM    EOSABS 0.31 09/26/2022 05:21 AM    BASOSABS 0.01 09/26/2022 05:21 AM     CMP:    Lab Results   Component Value Date/Time     09/26/2022 05:21 AM    K 4.4 09/26/2022 05:21 AM    K 4.1 09/19/2022 04:53 AM    CL 98 09/26/2022 05:21 AM    CO2 29 09/26/2022 05:21 AM    BUN 18 09/26/2022 05:21 AM    CREATININE 0.9 09/26/2022 05:21 AM    GFRAA >60 09/26/2022 05:21 AM    LABGLOM >60 09/26/2022 05:21 AM    GLUCOSE 80 09/26/2022 05:21 AM    GLUCOSE 116 05/02/2012 07:40 PM    PROT 5.4 09/13/2022 06:05 AM    LABALBU 2.9 09/13/2022 06:05 AM    LABALBU 3.6 05/02/2012 07:40 PM    CALCIUM 9.1 09/26/2022 05:21 AM    BILITOT 0.3 09/13/2022 06:05 AM    ALKPHOS 61 09/13/2022 06:05 AM    AST 17 09/13/2022 06:05 AM    ALT 8 09/13/2022 06:05 AM     Magnesium:    Lab Results   Component Value Date/Time    MG 2.4 09/16/2022 05:08 AM     Phosphorus:    Lab Results   Component Value Date/Time    PHOS 3.4 03/01/2021 04:39 PM     Radiology Review:        CT Abd/Pelvis 9/15/22   1. Gas fluid levels within the colon may reflect a diarrheal process. 2.  Postop changes in the abdomen as described. No evidence of bowel   obstruction.   No free air or free fluid. 3.  Stable lung base opacities and chronic interstitial lung disease. CXR 9/20/2022   No acute process. Stable cardiomegaly. BRIEF SUMMARY OF INITIAL CONSULT:    Mrs. Barbara Valdivia is a 73 y.o. 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 On September 12 2022, patient presented to the ED again for rectal bleeding. Labs were significant for chloride 94, bicarbonate 30, creatinine 2.0. Renal function improved with IVF administration, creatinine went down to 0.9 mg/dL on September 14. Since then his creatinine level has progressively increased up to 1.9 mg/dL, reason for this consultation. Review of her records showed that on September 14 she was started on furosemide and the following day on September 15 she had a CT abdomen and pelvis with IV contrast.    Problems resolved: IRMA stage II, likely volume responsive prerenal IRMA versus ATN 2/2 contrast induced IRMA in the setting of diuretic administration. Patient received IV contrast on 9/10/22 with recovery of renal function and received contrast again on 9/15/22. Renal function improved with IV fluid administration. Resolved.    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 HTN, on metoprolol  HFpEF 60%, on metoprolol, Echo pending, Lasix on hold.  Pro-BNP 13,419 >>8,729>> 5,184>>6,780  ---------------------------------------------------------------  Pulmonary HTN  PAF, on diltiazem and metoprolol  Hypothyroidism, probably secondary  Anemia, normocytic     Plan:    Continue prednisone 5 mg PO daily   Continue diltiazem 30 mg p.o. 3 times daily  Continue metoprolol succinate 100 mg daily  Continue to hold Lasix for now  Continue DDAVP 200 mcg nightly  OK to discharge from renal point of view  Follow-up in our office in 1 month  BMP in 3 weeks      Electronically signed by FRIDA Guadrado NP on 9/26/2022 at 12:37 PM

## 2022-09-26 NOTE — PLAN OF CARE
Problem: Pain  Goal: Verbalizes/displays adequate comfort level or baseline comfort level  Outcome: Progressing     Problem: Safety - Adult  Goal: Free from fall injury  Outcome: Progressing     Problem: Chronic Conditions and Co-morbidities  Goal: Patient's chronic conditions and co-morbidity symptoms are monitored and maintained or improved  Outcome: Progressing     Problem: Discharge Planning  Goal: Discharge to home or other facility with appropriate resources  Outcome: Progressing

## 2022-09-26 NOTE — PROGRESS NOTES
Date: 9/26/2022    Time: 7:24 PM    Patient Placed On BIPAP/CPAP/ Non-Invasive Ventilation? No    If no must comment. Facial area red/color change? No           If YES are Blister/Lesion present? No   If yes must notify nursing staff  BIPAP/CPAP skin barrier?   Yes    Skin barrier type:mepilexlite       Comments:    Remains on      Demetrius Car RCP

## 2022-09-27 VITALS
HEIGHT: 60 IN | OXYGEN SATURATION: 100 % | TEMPERATURE: 98.6 F | HEART RATE: 92 BPM | SYSTOLIC BLOOD PRESSURE: 134 MMHG | RESPIRATION RATE: 18 BRPM | WEIGHT: 184.3 LBS | BODY MASS INDEX: 36.18 KG/M2 | DIASTOLIC BLOOD PRESSURE: 88 MMHG

## 2022-09-27 LAB
ANION GAP SERPL CALCULATED.3IONS-SCNC: 13 MMOL/L (ref 7–16)
ANISOCYTOSIS: ABNORMAL
BASOPHILS ABSOLUTE: 0.02 E9/L (ref 0–0.2)
BASOPHILS RELATIVE PERCENT: 0.4 % (ref 0–2)
BUN BLDV-MCNC: 15 MG/DL (ref 6–23)
CALCIUM SERPL-MCNC: 9.4 MG/DL (ref 8.6–10.2)
CHLORIDE BLD-SCNC: 98 MMOL/L (ref 98–107)
CO2: 26 MMOL/L (ref 22–29)
CREAT SERPL-MCNC: 0.7 MG/DL (ref 0.5–1)
EOSINOPHILS ABSOLUTE: 0.26 E9/L (ref 0.05–0.5)
EOSINOPHILS RELATIVE PERCENT: 4.7 % (ref 0–6)
GFR AFRICAN AMERICAN: >60
GFR NON-AFRICAN AMERICAN: >60 ML/MIN/1.73
GLUCOSE BLD-MCNC: 63 MG/DL (ref 74–99)
HCT VFR BLD CALC: 34.3 % (ref 34–48)
HEMOGLOBIN: 10.1 G/DL (ref 11.5–15.5)
IMMATURE GRANULOCYTES #: 0.01 E9/L
IMMATURE GRANULOCYTES %: 0.2 % (ref 0–5)
LYMPHOCYTES ABSOLUTE: 0.44 E9/L (ref 1.5–4)
LYMPHOCYTES RELATIVE PERCENT: 7.9 % (ref 20–42)
MCH RBC QN AUTO: 24.9 PG (ref 26–35)
MCHC RBC AUTO-ENTMCNC: 29.4 % (ref 32–34.5)
MCV RBC AUTO: 84.7 FL (ref 80–99.9)
MONOCYTES ABSOLUTE: 0.67 E9/L (ref 0.1–0.95)
MONOCYTES RELATIVE PERCENT: 12 % (ref 2–12)
NEUTROPHILS ABSOLUTE: 4.19 E9/L (ref 1.8–7.3)
NEUTROPHILS RELATIVE PERCENT: 74.8 % (ref 43–80)
OVALOCYTES: ABNORMAL
PDW BLD-RTO: 17.1 FL (ref 11.5–15)
PLATELET # BLD: 156 E9/L (ref 130–450)
PMV BLD AUTO: 10.4 FL (ref 7–12)
POIKILOCYTES: ABNORMAL
POTASSIUM SERPL-SCNC: 4.6 MMOL/L (ref 3.5–5)
RBC # BLD: 4.05 E12/L (ref 3.5–5.5)
SARS-COV-2, NAAT: NOT DETECTED
SODIUM BLD-SCNC: 137 MMOL/L (ref 132–146)
TARGET CELLS: ABNORMAL
WBC # BLD: 5.6 E9/L (ref 4.5–11.5)

## 2022-09-27 PROCEDURE — 85025 COMPLETE CBC W/AUTO DIFF WBC: CPT

## 2022-09-27 PROCEDURE — 94640 AIRWAY INHALATION TREATMENT: CPT

## 2022-09-27 PROCEDURE — 2580000003 HC RX 258: Performed by: FAMILY MEDICINE

## 2022-09-27 PROCEDURE — 6370000000 HC RX 637 (ALT 250 FOR IP)

## 2022-09-27 PROCEDURE — 6360000002 HC RX W HCPCS: Performed by: FAMILY MEDICINE

## 2022-09-27 PROCEDURE — 6370000000 HC RX 637 (ALT 250 FOR IP): Performed by: FAMILY MEDICINE

## 2022-09-27 PROCEDURE — 36415 COLL VENOUS BLD VENIPUNCTURE: CPT

## 2022-09-27 PROCEDURE — 80048 BASIC METABOLIC PNL TOTAL CA: CPT

## 2022-09-27 PROCEDURE — 2700000000 HC OXYGEN THERAPY PER DAY

## 2022-09-27 PROCEDURE — 87635 SARS-COV-2 COVID-19 AMP PRB: CPT

## 2022-09-27 PROCEDURE — 94660 CPAP INITIATION&MGMT: CPT

## 2022-09-27 PROCEDURE — 6370000000 HC RX 637 (ALT 250 FOR IP): Performed by: STUDENT IN AN ORGANIZED HEALTH CARE EDUCATION/TRAINING PROGRAM

## 2022-09-27 RX ADMIN — FERROUS SULFATE TAB 325 MG (65 MG ELEMENTAL FE) 325 MG: 325 (65 FE) TAB at 09:47

## 2022-09-27 RX ADMIN — DILTIAZEM HYDROCHLORIDE 30 MG: 30 TABLET, FILM COATED ORAL at 13:46

## 2022-09-27 RX ADMIN — ENOXAPARIN SODIUM 40 MG: 100 INJECTION SUBCUTANEOUS at 09:46

## 2022-09-27 RX ADMIN — LEVOTHYROXINE SODIUM 75 MCG: 0.07 TABLET ORAL at 05:40

## 2022-09-27 RX ADMIN — OXYCODONE AND ACETAMINOPHEN 2 TABLET: 5; 325 TABLET ORAL at 14:10

## 2022-09-27 RX ADMIN — BUDESONIDE 250 MCG: 0.25 SUSPENSION RESPIRATORY (INHALATION) at 08:03

## 2022-09-27 RX ADMIN — ARFORMOTEROL TARTRATE 15 MCG: 15 SOLUTION RESPIRATORY (INHALATION) at 08:03

## 2022-09-27 RX ADMIN — HYDROCORTISONE: 25 CREAM TOPICAL at 09:49

## 2022-09-27 RX ADMIN — GUAIFENESIN 400 MG: 400 TABLET ORAL at 14:11

## 2022-09-27 RX ADMIN — PREDNISONE 5 MG: 5 TABLET ORAL at 09:47

## 2022-09-27 RX ADMIN — DILTIAZEM HYDROCHLORIDE 30 MG: 30 TABLET, FILM COATED ORAL at 05:33

## 2022-09-27 RX ADMIN — Medication 10 ML: at 09:49

## 2022-09-27 RX ADMIN — OXYCODONE AND ACETAMINOPHEN 2 TABLET: 5; 325 TABLET ORAL at 10:01

## 2022-09-27 RX ADMIN — ESCITALOPRAM OXALATE 10 MG: 10 TABLET ORAL at 09:47

## 2022-09-27 RX ADMIN — METOPROLOL SUCCINATE 100 MG: 100 TABLET, FILM COATED, EXTENDED RELEASE ORAL at 09:47

## 2022-09-27 RX ADMIN — FAMOTIDINE 20 MG: 20 TABLET ORAL at 09:48

## 2022-09-27 RX ADMIN — GUAIFENESIN 400 MG: 400 TABLET ORAL at 09:47

## 2022-09-27 RX ADMIN — OXYCODONE AND ACETAMINOPHEN 2 TABLET: 5; 325 TABLET ORAL at 05:35

## 2022-09-27 ASSESSMENT — PAIN SCALES - GENERAL
PAINLEVEL_OUTOF10: 8
PAINLEVEL_OUTOF10: 7
PAINLEVEL_OUTOF10: 10
PAINLEVEL_OUTOF10: 0

## 2022-09-27 ASSESSMENT — PAIN DESCRIPTION - DESCRIPTORS
DESCRIPTORS: ACHING
DESCRIPTORS: ACHING;CRAMPING
DESCRIPTORS: ACHING

## 2022-09-27 ASSESSMENT — PAIN DESCRIPTION - FREQUENCY: FREQUENCY: CONTINUOUS

## 2022-09-27 ASSESSMENT — PAIN DESCRIPTION - LOCATION
LOCATION: BACK

## 2022-09-27 ASSESSMENT — PAIN DESCRIPTION - PAIN TYPE: TYPE: CHRONIC PAIN

## 2022-09-27 ASSESSMENT — PAIN DESCRIPTION - ORIENTATION
ORIENTATION: POSTERIOR
ORIENTATION: LOWER
ORIENTATION: LOWER

## 2022-09-27 ASSESSMENT — PAIN - FUNCTIONAL ASSESSMENT: PAIN_FUNCTIONAL_ASSESSMENT: ACTIVITIES ARE NOT PREVENTED

## 2022-09-27 ASSESSMENT — PAIN DESCRIPTION - ONSET: ONSET: ON-GOING

## 2022-09-27 NOTE — PROGRESS NOTES
Department of Internal Medicine  Nephrology Progress Note    Events reviewed. SUBJECTIVE:  We are following Mrs. Iman Rodriguez for IRMA. She denies any new complaints.      PHYSICAL EXAM:      Vitals:    VITALS:  /88   Pulse 92   Temp 98.6 °F (37 °C) (Oral)   Resp 18   Ht 5' (1.524 m)   Wt 184 lb 4.8 oz (83.6 kg)   LMP  (LMP Unknown)   SpO2 100%   BMI 35.99 kg/m²   24HR INTAKE/OUTPUT:    Intake/Output Summary (Last 24 hours) at 9/27/2022 1341  Last data filed at 9/27/2022 9546  Gross per 24 hour   Intake 10 ml   Output 2200 ml   Net -2190 ml       Constitutional: Alert and oriented  HEENT:  PERRLA  Respiratory:  CTA  Cardiovascular/Edema:  Normal S1/S2, RRR, no murmur, no edema  Gastrointestinal:  Soft, non-distended  Neurologic:  non-focal  Skin:  No rashes, lesions     Scheduled Meds:   furosemide  20 mg Oral Q MWF    hydrocortisone   Rectal BID    desmopressin  200 mcg Oral Nightly    guaiFENesin  400 mg Oral TID    dilTIAZem  30 mg Oral TID    escitalopram  10 mg Oral BID    famotidine  20 mg Oral Daily    ferrous sulfate  325 mg Oral BID WC    levothyroxine  75 mcg Oral QAM AC    metoprolol succinate  100 mg Oral Daily    predniSONE  5 mg Oral Daily    budesonide  0.25 mg Nebulization BID    And    Arformoterol Tartrate  15 mcg Nebulization BID    enoxaparin  40 mg SubCUTAneous Daily    sodium chloride flush  10 mL IntraVENous 2 times per day     Continuous Infusions:   dextrose      sodium chloride       PRN Meds:.perflutren lipid microspheres, white petrolatum, bismuth subsalicylate, potassium chloride **OR** potassium alternative oral replacement **OR** potassium chloride, glucose, dextrose bolus **OR** dextrose bolus, glucagon (rDNA), dextrose, promethazine-codeine, loperamide, iopamidol, oxyCODONE-acetaminophen, albuterol, sodium chloride flush, sodium chloride, [DISCONTINUED] promethazine **OR** ondansetron, polyethylene glycol, acetaminophen **OR** acetaminophen    DATA:    CBC with Differential:    Lab Results   Component Value Date/Time    WBC 5.6 09/27/2022 05:14 AM    RBC 4.05 09/27/2022 05:14 AM    RBC 3.57 12/14/2021 09:57 AM    HGB 10.1 09/27/2022 05:14 AM    HCT 34.3 09/27/2022 05:14 AM     09/27/2022 05:14 AM    MCV 84.7 09/27/2022 05:14 AM    MCH 24.9 09/27/2022 05:14 AM    MCHC 29.4 09/27/2022 05:14 AM    RDW 17.1 09/27/2022 05:14 AM    NRBC 0.9 09/25/2022 06:12 AM    SEGSPCT 77 01/15/2014 04:00 PM    BANDSPCT 1 06/25/2014 05:45 AM    METASPCT 0.9 09/21/2022 07:23 AM    LYMPHOPCT 7.9 09/27/2022 05:14 AM    LYMPHOPCT 14.5 12/14/2021 09:57 AM    PROMYELOPCT 0.9 09/10/2022 01:23 PM    MONOPCT 12.0 09/27/2022 05:14 AM    MYELOPCT 0.9 03/01/2021 04:39 PM    BASOPCT 0.4 09/27/2022 05:14 AM    MONOSABS 0.67 09/27/2022 05:14 AM    LYMPHSABS 0.44 09/27/2022 05:14 AM    EOSABS 0.26 09/27/2022 05:14 AM    BASOSABS 0.02 09/27/2022 05:14 AM     CMP:    Lab Results   Component Value Date/Time     09/27/2022 05:14 AM    K 4.6 09/27/2022 05:14 AM    K 4.1 09/19/2022 04:53 AM    CL 98 09/27/2022 05:14 AM    CO2 26 09/27/2022 05:14 AM    BUN 15 09/27/2022 05:14 AM    CREATININE 0.7 09/27/2022 05:14 AM    GFRAA >60 09/27/2022 05:14 AM    LABGLOM >60 09/27/2022 05:14 AM    GLUCOSE 63 09/27/2022 05:14 AM    GLUCOSE 116 05/02/2012 07:40 PM    PROT 5.4 09/13/2022 06:05 AM    LABALBU 2.9 09/13/2022 06:05 AM    LABALBU 3.6 05/02/2012 07:40 PM    CALCIUM 9.4 09/27/2022 05:14 AM    BILITOT 0.3 09/13/2022 06:05 AM    ALKPHOS 61 09/13/2022 06:05 AM    AST 17 09/13/2022 06:05 AM    ALT 8 09/13/2022 06:05 AM     Magnesium:    Lab Results   Component Value Date/Time    MG 2.4 09/16/2022 05:08 AM     Phosphorus:    Lab Results   Component Value Date/Time    PHOS 3.4 03/01/2021 04:39 PM     Radiology Review:        CT Abd/Pelvis 9/15/22   1. Gas fluid levels within the colon may reflect a diarrheal process. 2.  Postop changes in the abdomen as described. No evidence of bowel   obstruction.   No free air or free fluid. 3.  Stable lung base opacities and chronic interstitial lung disease. CXR 9/20/2022   No acute process. Stable cardiomegaly. BRIEF SUMMARY OF INITIAL CONSULT:    Mrs. Dayanna Bailey is a 73 y.o. 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 On September 12 2022, patient presented to the ED again for rectal bleeding. Labs were significant for chloride 94, bicarbonate 30, creatinine 2.0. Renal function improved with IVF administration, creatinine went down to 0.9 mg/dL on September 14. Since then his creatinine level has progressively increased up to 1.9 mg/dL, reason for this consultation. Review of her records showed that on September 14 she was started on furosemide and the following day on September 15 she had a CT abdomen and pelvis with IV contrast.    Problems resolved: IRMA stage II, likely volume responsive prerenal IRMA versus ATN 2/2 contrast induced IRMA in the setting of diuretic administration. Patient received IV contrast on 9/10/22 with recovery of renal function and received contrast again on 9/15/22. Renal function improved with IV fluid administration. Resolved.    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 HTN, on metoprolol  HFpEF 60%, on metoprolol, Echo pending, Lasix on hold.  Pro-BNP 13,419 >>8,729>> 5,184>>6,780  ---------------------------------------------------------------  Pulmonary HTN  PAF, on diltiazem and metoprolol  Hypothyroidism, probably secondary  Anemia, normocytic     Plan:    Continue prednisone 5 mg PO daily   Continue diltiazem 30 mg p.o. 3 times daily  Continue metoprolol succinate 100 mg daily  Continue to hold Lasix for now  Continue DDAVP 200 mcg nightly  OK to discharge from renal point of view  Follow-up in our office in 1 month  BMP in 3 weeks      Electronically signed by FRIDA Cool CNP on 9/27/2022 at 1:41 PM

## 2022-09-27 NOTE — DISCHARGE SUMMARY
Hospital Medicine Discharge Summary    Patient ID: Roberth Talavera      Patient's PCP: Denise Blair MD    Admit Date: 9/12/2022     Discharge Date:   9/27/2022    Admitting Physician: Santa Ariza DO     Discharge Physician: Jason Moyer MD     Discharge Diagnoses: Active Hospital Problems    Diagnosis Date Noted    BRBPR (bright red blood per rectum) [K62.5] 06/28/2014       The patient was seen and examined on day of discharge and this discharge summary is in conjunction with any daily progress note from day of discharge.     Hospital Course: Ms. Roberth Talavera, a 73y.o. year old female  who  has a past medical history of A-fib ([address] Able to transfer from chair to wheelchair, Abnormal mammogram, Acute on chronic respiratory failure ([address] Anemia, Anemia due to chronic illness, Ankle fracture, left, 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[address] Backache, Benign hypertension, CHF (congestive heart failure) ([address] Chronic back pain, Chronic kidney disease, Chronic pain disorder, Chronic, continuous use of opioids, Compression fracture of thoracic vertebra ([address] COPD (chronic obstructive pulmonary disease) ([address] Debility, Deformity of ankle and foot, acquired, Depression, Diabetes insipidus ([address] Diverticulitis, Dry eyes, Encephalopathy acute, Gait disturbance, GERD (gastroesophageal reflux disease), Hemorrhoids, Hernia, History of blood transfusion, History of Clostridium difficile, Hyperlipidemia, Hypothyroidism, Ischemic colitis, enteritis, or enterocolitis ([address] Long term (current) use of systemic steroids, Long-term current use of steroids, Lumbar disc herniation, Myalgia and myositis, unspecified, Nephrosclerosis, Nonunion of fracture, Osteoarthritis, PAF (paroxysmal atrial fibrillation) ([address] 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[address] Pulmonary hypertension ([address] Pulmonary sarcoidosis ([address] Rectal bleeding, Rhabdomyolysis, Steroid-induced avascular necrosis of shoulder ([address] Tibia fracture, and Ventral hernia without obstruction or gangrene. She was admitted with a complaint of abdominal pain and bright red blood per rectum. She was admitted and managed for rectal bleeding. She was also found to have UTI and was started on IV ceftriaxone. The rectal bleeding resolved. Hospital course was complicated by shortness of breath due to severe pulmonary hypertension and sarcoidosis as well as severe COPD. Pulmonology was consulted. Shortness of breath improved and she was on her baseline 6 L of oxygen. She remained stable and was discharged home on 9/27/2022. She is follow-up with a PCP and pulmonologist.    Patient was clinically stable at time of discharge. Exam:     /88   Pulse 89   Temp 98.6 °F (37 °C) (Oral)   Resp 18   Ht 5' (1.524 m)   Wt 184 lb 4.8 oz (83.6 kg)   LMP  (LMP Unknown)   SpO2 100%   BMI 35.99 kg/m²     General appearance: No apparent distress, appears stated age and cooperative, obese  HEENT: Pupils equal, round, and reactive to light. Conjunctivae/corneas clear. Neck: Supple, with full range of motion. No jugular venous distention. Trachea midline. Respiratory:  diminished breath sounds bibasally, few crackles. No wheezes or crackles. On 6L of oxygen. Cardiovascular: Regular rate and rhythm with normal S1/S2 without murmurs, rubs or gallops. Abdomen: Soft, non-tender, non-distended with normal bowel sounds. Musculoskeletal: No clubbing, cyanosis or edema bilaterally. Full range of motion without deformity. Skin: Skin color, texture, turgor normal.  No rashes or lesions. Neurologic:  Neurovascularly intact without any focal sensory/motor deficits.  Cranial nerves: II-XII intact, grossly non-focal.  Psychiatric: Alert and oriented, thought content appropriate, normal insight      Consults:     IP CONSULT TO HOSPITALIST  IP CONSULT TO DIETITIAN  IP CONSULT TO SOCIAL WORK  TEST MOCK CON71  IP CONSULT TO PULMONOLOGY  IP CONSULT TO PULMONOLOGY  IP CONSULT TO NEPHROLOGY  IP CONSULT TO PALLIATIVE CARE    Significant Diagnostic Studies:     XR CHEST PORTABLE   Final Result   Diffusely increased right lung opacity, which may represent asymmetric   pulmonary edema, but infection not excluded. Probable bilateral small   pleural effusions. XR CHEST PORTABLE   Final Result   No acute process. Stable cardiomegaly. XR CHEST PORTABLE   Final Result   Stable heart size with mild pulmonary vascular congestive changes. XR CHEST (2 VW)   Final Result   Cardiomegaly with moderate interstitial pulmonary edema. CT ABDOMEN PELVIS W IV CONTRAST Additional Contrast? Radiologist Recommendation   Final Result   1. Gas fluid levels within the colon may reflect a diarrheal process. 2.  Postop changes in the abdomen as described. No evidence of bowel   obstruction. No free air or free fluid. 3.  Stable lung base opacities and chronic interstitial lung disease. XR CHEST (2 VW)    (Results Pending)        Disposition:  home with home health     Discharge Instructions/Follow-up:  follow up with PCP and pulmonologist within 1-2 weeks    Code Status:  Full Code     Activity: activity as tolerated    Diet: cardiac diet    Labs:  For convenience and continuity at follow-up the following most recent labs are provided:      CBC:    Lab Results   Component Value Date/Time    WBC 5.6 09/27/2022 05:14 AM    HGB 10.1 09/27/2022 05:14 AM    HCT 34.3 09/27/2022 05:14 AM     09/27/2022 05:14 AM       Renal:    Lab Results   Component Value Date/Time     09/27/2022 05:14 AM    K 4.6 09/27/2022 05:14 AM    K 4.1 09/19/2022 04:53 AM    CL 98 09/27/2022 05:14 AM    CO2 26 09/27/2022 05:14 AM    BUN 15 09/27/2022 05:14 AM    CREATININE 0.7 09/27/2022 05:14 AM    CALCIUM 9.4 09/27/2022 05:14 AM    PHOS 3.4 03/01/2021 04:39 PM       Discharge Medications:     Current Discharge Medication List             Details   famotidine (PEPCID) 20 MG tablet Take 1 tablet by mouth daily  Qty: 60 tablet, Refills: 0      escitalopram (LEXAPRO) 10 MG tablet Take 1 tablet by mouth daily  Qty: 30 tablet, Refills: 0                Details   furosemide (LASIX) 40 MG tablet Take 40 mg by mouth three times a week      !! metoprolol succinate (TOPROL XL) 100 MG extended release tablet Take 100 mg by mouth daily Given with Toprol xl 50 mg total dose 150 mg      !! metoprolol succinate (TOPROL XL) 50 MG extended release tablet Take 50 mg by mouth daily Given with Toprol xl 100 mg total dose 150 mg      promethazine-codeine (PHENERGAN WITH CODEINE) 6.25-10 MG/5ML syrup Take 5 mLs by mouth 4 times daily as needed for Cough for up to 60 days. Qty: 473 mL, Refills: 1    Comments: Reduce doses taken as pain becomes manageable  Associated Diagnoses: COPD, moderate (HCC)      omega-3 acid ethyl esters (LOVAZA) 1 g capsule Take 1 capsule by mouth in the morning and 1 capsule before bedtime. Qty: 60 capsule, Refills: 2    Associated Diagnoses: Medication refill      OXYGEN Inhale 2 L/min into the lungs as needed (shortness of breath, low oxygen <90%) BMS  Qty: 1 Canister, Refills: 3      dilTIAZem (CARDIZEM) 30 MG tablet Take 1 tablet by mouth in the morning and 1 tablet at noon and 1 tablet before bedtime.   Qty: 120 tablet, Refills: 3      levothyroxine (SYNTHROID) 75 MCG tablet Take 1 tablet by mouth daily  Qty: 30 tablet, Refills: 5    Associated Diagnoses: Hypothyroidism, unspecified type      ferrous sulfate (IRON 325) 325 (65 Fe) MG tablet Take 1 tablet by mouth 2 times daily  Qty: 60 tablet, Refills: 5    Associated Diagnoses: Iron deficiency      predniSONE (DELTASONE) 5 MG tablet Take 1 tablet by mouth daily  Qty: 30 tablet, Refills: 5    Associated Diagnoses: Sarcoidosis      cycloSPORINE (RESTASIS) 0.05 % ophthalmic emulsion Place 1 drop into both eyes every 12 hours  Qty: 5.5 mL, Refills: 1    Associated Diagnoses: Dry eyes      desmopressin (DDAVP) 0.1 mg tablet Take 2 tablets by mouth 2 times daily  Qty: 120 tablet, Refills: 2    Associated Diagnoses: Urinary incontinence, unspecified type      albuterol sulfate HFA (PROVENTIL HFA) 108 (90 Base) MCG/ACT inhaler Inhale 2 puffs into the lungs every 6 hours as needed for Wheezing or Shortness of Breath  Qty: 1 each, Refills: 5      BREO ELLIPTA 100-25 MCG/INH AEPB inhaler Inhale 1 puff into the lungs daily  Qty: 28 each, Refills: 5      albuterol (PROVENTIL) (2.5 MG/3ML) 0.083% nebulizer solution Take 3 mLs by nebulization every 6 hours as needed for Wheezing or Shortness of Breath  Qty: 120 each, Refills: 2    Associated Diagnoses: Sarcoidosis; Mixed simple and mucopurulent chronic bronchitis ([facility] [address]       ! ! - Potential duplicate medications found. Please discuss with provider. Time Spent on discharge is more than 45 minutes in the examination, evaluation, counseling and review of medications and discharge plan.       Signed:    Osbaldo Yanez MD   9/27/2022

## 2022-09-27 NOTE — PROGRESS NOTES
Spoke with Chani and gina and provided AVAPS settings Rate 20 tidal 450 0% 02 and Epap of 8 to her. Clarified that machine was available as daughter called unit after patient was discharged stating that gina did not have machine available to patient. Confirmed with them that patient is on at night and PRN.

## 2022-09-27 NOTE — PROGRESS NOTES
Nurse to nurse given to hjony at [address] [facility], informed her ramirez was removed at patient due to void.  All discharge paperwork faxed to [phone]

## 2022-09-27 NOTE — CARE COORDINATION
Received message from Adeola Hodge at [place] [facility]; she has Alberto Brown and patient will need a negative covid test. Ambulance transport set up for 2p via [facility]. Nurse checking for discharge. Met with patient and daughter at bedside to provide update. Daughter expressed concern with the patient being discharged too soon. Informed patient of her rights, explained discharge process and discussed care to be provided at [place] [id number]. All questions answered. Ambulance form and [facility] completed and envelope placed on the soft chart.     ElLIANA Mejia, LSW [phone]

## 2022-09-27 NOTE — DISCHARGE INSTR - DIET
Good nutrition is important when healing from an illness, injury, or surgery. Follow any nutrition recommendations given to you during your hospital stay. If you were given an oral nutrition supplement while in the hospital, continue to take this supplement at home. You can take it with meals, in-between meals, and/or before bedtime. These supplements can be purchased at most local grocery stores, pharmacies, and chain [employer]-stores. If you have any questions about your diet or nutrition, call the hospital and ask for the dietitian.

## 2022-09-27 NOTE — PROGRESS NOTES
Attempted to call nurse to nurse at [address] but nurse was not able to take report per . Informed her of [phone]  time and provided number for them to return call for nurse to nurse.

## 2022-09-28 ENCOUNTER — APPOINTMENT (OUTPATIENT)
Dept: GENERAL RADIOLOGY | Age: 67
DRG: 291 | End: 2022-09-28
Payer: MEDICARE

## 2022-09-28 ENCOUNTER — HOSPITAL ENCOUNTER (INPATIENT)
Age: 67
LOS: 6 days | Discharge: NURSING, REHAB OR LONG-TERM CARE | DRG: 291 | End: 2022-10-04
Attending: EMERGENCY MEDICINE | Admitting: FAMILY MEDICINE
Payer: MEDICARE

## 2022-09-28 DIAGNOSIS — J96.01 ACUTE RESPIRATORY FAILURE WITH HYPOXIA (HCC): ICD-10-CM

## 2022-09-28 DIAGNOSIS — I50.9 CONGESTIVE HEART FAILURE, UNSPECIFIED HF CHRONICITY, UNSPECIFIED HEART FAILURE TYPE (HCC): Primary | ICD-10-CM

## 2022-09-28 LAB
ALBUMIN SERPL-MCNC: 3.8 G/DL (ref 3.5–5.2)
ALP BLD-CCNC: 114 U/L (ref 35–104)
ALT SERPL-CCNC: 27 U/L (ref 0–32)
AMPHETAMINE SCREEN, URINE: NOT DETECTED
ANION GAP SERPL CALCULATED.3IONS-SCNC: 5 MMOL/L (ref 7–16)
ANISOCYTOSIS: ABNORMAL
AST SERPL-CCNC: 32 U/L (ref 0–31)
B.E.: 11.7 MMOL/L (ref -3–3)
B.E.: 15.4 MMOL/L (ref -3–3)
BARBITURATE SCREEN URINE: NOT DETECTED
BASOPHILS ABSOLUTE: 0 E9/L (ref 0–0.2)
BASOPHILS RELATIVE PERCENT: 0.2 % (ref 0–2)
BENZODIAZEPINE SCREEN, URINE: NOT DETECTED
BILIRUB SERPL-MCNC: 0.6 MG/DL (ref 0–1.2)
BILIRUBIN URINE: NEGATIVE
BLOOD, URINE: NEGATIVE
BUN BLDV-MCNC: 15 MG/DL (ref 6–23)
CALCIUM SERPL-MCNC: 9.8 MG/DL (ref 8.6–10.2)
CANNABINOID SCREEN URINE: NOT DETECTED
CHLORIDE BLD-SCNC: 93 MMOL/L (ref 98–107)
CLARITY: CLEAR
CO2: 40 MMOL/L (ref 22–29)
COCAINE METABOLITE SCREEN URINE: NOT DETECTED
COHB: 0.7 % (ref 0–1.5)
COHB: 0.7 % (ref 0–1.5)
COLOR: YELLOW
CREAT SERPL-MCNC: 0.8 MG/DL (ref 0.5–1)
CRITICAL: ABNORMAL
CRITICAL: ABNORMAL
DATE ANALYZED: ABNORMAL
DATE ANALYZED: ABNORMAL
DATE OF COLLECTION: ABNORMAL
DATE OF COLLECTION: ABNORMAL
EOSINOPHILS ABSOLUTE: 0.21 E9/L (ref 0.05–0.5)
EOSINOPHILS RELATIVE PERCENT: 3.5 % (ref 0–6)
FENTANYL SCREEN, URINE: NOT DETECTED
FIO2: 100 %
FIO2: 90 %
GFR AFRICAN AMERICAN: >60
GFR NON-AFRICAN AMERICAN: >60 ML/MIN/1.73
GLUCOSE BLD-MCNC: 77 MG/DL (ref 74–99)
GLUCOSE URINE: NEGATIVE MG/DL
HCO3: 37.1 MMOL/L (ref 22–26)
HCO3: 40.8 MMOL/L (ref 22–26)
HCT VFR BLD CALC: 32.1 % (ref 34–48)
HEMOGLOBIN: 9.6 G/DL (ref 11.5–15.5)
HHB: 0.9 % (ref 0–5)
HHB: 1.3 % (ref 0–5)
HYPOCHROMIA: ABNORMAL
KETONES, URINE: NEGATIVE MG/DL
LAB: ABNORMAL
LAB: ABNORMAL
LEUKOCYTE ESTERASE, URINE: NEGATIVE
LYMPHOCYTES ABSOLUTE: 0.36 E9/L (ref 1.5–4)
LYMPHOCYTES RELATIVE PERCENT: 6.1 % (ref 20–42)
Lab: ABNORMAL
MCH RBC QN AUTO: 25 PG (ref 26–35)
MCHC RBC AUTO-ENTMCNC: 29.9 % (ref 32–34.5)
MCV RBC AUTO: 83.6 FL (ref 80–99.9)
METHADONE SCREEN, URINE: NOT DETECTED
METHB: 0.3 % (ref 0–1.5)
METHB: 0.4 % (ref 0–1.5)
MODE: ABNORMAL
MODE: ABNORMAL
MONOCYTES ABSOLUTE: 0.18 E9/L (ref 0.1–0.95)
MONOCYTES RELATIVE PERCENT: 2.6 % (ref 2–12)
NEUTROPHILS ABSOLUTE: 5.28 E9/L (ref 1.8–7.3)
NEUTROPHILS RELATIVE PERCENT: 87.8 % (ref 43–80)
NITRITE, URINE: NEGATIVE
O2 CONTENT: 14.6 ML/DL
O2 CONTENT: 14.9 ML/DL
O2 SATURATION: 98.7 % (ref 92–98.5)
O2 SATURATION: 99.1 % (ref 92–98.5)
O2HB: 97.6 % (ref 94–97)
O2HB: 98.1 % (ref 94–97)
OPERATOR ID: 467
OPERATOR ID: ABNORMAL
OPIATE SCREEN URINE: NOT DETECTED
OVALOCYTES: ABNORMAL
OXYCODONE URINE: POSITIVE
PATIENT TEMP: 37 C
PATIENT TEMP: 37 C
PCO2: 53.4 MMHG (ref 35–45)
PCO2: 54.5 MMHG (ref 35–45)
PDW BLD-RTO: 16.8 FL (ref 11.5–15)
PEEP/CPAP: 6 CMH2O
PEEP/CPAP: 6 CMH2O
PFO2: 1.24 MMHG/%
PFO2: 1.65 MMHG/%
PH BLOOD GAS: 7.46 (ref 7.35–7.45)
PH BLOOD GAS: 7.49 (ref 7.35–7.45)
PH UA: 6 (ref 5–9)
PHENCYCLIDINE SCREEN URINE: NOT DETECTED
PLATELET # BLD: 193 E9/L (ref 130–450)
PMV BLD AUTO: 10.8 FL (ref 7–12)
PO2: 124 MMHG (ref 75–100)
PO2: 148.7 MMHG (ref 75–100)
POIKILOCYTES: ABNORMAL
POLYCHROMASIA: ABNORMAL
POTASSIUM SERPL-SCNC: 3.8 MMOL/L (ref 3.5–5)
PRO-BNP: 6282 PG/ML (ref 0–125)
PROCALCITONIN: 0.11 NG/ML (ref 0–0.08)
PROTEIN UA: NEGATIVE MG/DL
RBC # BLD: 3.84 E12/L (ref 3.5–5.5)
RR MECHANICAL: 16 B/MIN
RR MECHANICAL: 16 B/MIN
SODIUM BLD-SCNC: 138 MMOL/L (ref 132–146)
SOURCE, BLOOD GAS: ABNORMAL
SOURCE, BLOOD GAS: ABNORMAL
SPECIFIC GRAVITY UA: 1.01 (ref 1–1.03)
THB: 10.5 G/DL (ref 11.5–16.5)
THB: 10.6 G/DL (ref 11.5–16.5)
TIME ANALYZED: 1358
TIME ANALYZED: 545
TOTAL PROTEIN: 6.8 G/DL (ref 6.4–8.3)
TROPONIN, HIGH SENSITIVITY: 17 NG/L (ref 0–9)
UROBILINOGEN, URINE: 0.2 E.U./DL
VT MECHANICAL: 450 ML
VT MECHANICAL: 450 ML
WBC # BLD: 6 E9/L (ref 4.5–11.5)

## 2022-09-28 PROCEDURE — 6370000000 HC RX 637 (ALT 250 FOR IP)

## 2022-09-28 PROCEDURE — 94660 CPAP INITIATION&MGMT: CPT

## 2022-09-28 PROCEDURE — 71045 X-RAY EXAM CHEST 1 VIEW: CPT

## 2022-09-28 PROCEDURE — 82805 BLOOD GASES W/O2 SATURATION: CPT

## 2022-09-28 PROCEDURE — 85025 COMPLETE CBC W/AUTO DIFF WBC: CPT

## 2022-09-28 PROCEDURE — 6360000002 HC RX W HCPCS: Performed by: EMERGENCY MEDICINE

## 2022-09-28 PROCEDURE — 6360000002 HC RX W HCPCS

## 2022-09-28 PROCEDURE — 99222 1ST HOSP IP/OBS MODERATE 55: CPT | Performed by: FAMILY MEDICINE

## 2022-09-28 PROCEDURE — 6370000000 HC RX 637 (ALT 250 FOR IP): Performed by: EMERGENCY MEDICINE

## 2022-09-28 PROCEDURE — 94640 AIRWAY INHALATION TREATMENT: CPT

## 2022-09-28 PROCEDURE — 83880 ASSAY OF NATRIURETIC PEPTIDE: CPT

## 2022-09-28 PROCEDURE — 84484 ASSAY OF TROPONIN QUANT: CPT

## 2022-09-28 PROCEDURE — 81003 URINALYSIS AUTO W/O SCOPE: CPT

## 2022-09-28 PROCEDURE — 2140000000 HC CCU INTERMEDIATE R&B

## 2022-09-28 PROCEDURE — 96374 THER/PROPH/DIAG INJ IV PUSH: CPT

## 2022-09-28 PROCEDURE — 93005 ELECTROCARDIOGRAM TRACING: CPT | Performed by: EMERGENCY MEDICINE

## 2022-09-28 PROCEDURE — 80307 DRUG TEST PRSMV CHEM ANLYZR: CPT

## 2022-09-28 PROCEDURE — 99285 EMERGENCY DEPT VISIT HI MDM: CPT

## 2022-09-28 PROCEDURE — 94664 DEMO&/EVAL PT USE INHALER: CPT

## 2022-09-28 PROCEDURE — 84145 PROCALCITONIN (PCT): CPT

## 2022-09-28 PROCEDURE — 80053 COMPREHEN METABOLIC PANEL: CPT

## 2022-09-28 PROCEDURE — 2580000003 HC RX 258

## 2022-09-28 RX ORDER — SODIUM CHLORIDE 0.9 % (FLUSH) 0.9 %
5-40 SYRINGE (ML) INJECTION EVERY 12 HOURS SCHEDULED
Status: DISCONTINUED | OUTPATIENT
Start: 2022-09-28 | End: 2022-10-04 | Stop reason: HOSPADM

## 2022-09-28 RX ORDER — SODIUM CHLORIDE 9 MG/ML
INJECTION, SOLUTION INTRAVENOUS PRN
Status: DISCONTINUED | OUTPATIENT
Start: 2022-09-28 | End: 2022-10-04 | Stop reason: HOSPADM

## 2022-09-28 RX ORDER — METOPROLOL SUCCINATE 25 MG/1
50 TABLET, EXTENDED RELEASE ORAL DAILY
Status: DISCONTINUED | OUTPATIENT
Start: 2022-09-28 | End: 2022-10-04 | Stop reason: HOSPADM

## 2022-09-28 RX ORDER — ACETAMINOPHEN 325 MG/1
650 TABLET ORAL EVERY 6 HOURS PRN
Status: DISCONTINUED | OUTPATIENT
Start: 2022-09-28 | End: 2022-10-04 | Stop reason: HOSPADM

## 2022-09-28 RX ORDER — POLYETHYLENE GLYCOL 3350 17 G/17G
17 POWDER, FOR SOLUTION ORAL DAILY PRN
Status: DISCONTINUED | OUTPATIENT
Start: 2022-09-28 | End: 2022-10-04 | Stop reason: HOSPADM

## 2022-09-28 RX ORDER — ARFORMOTEROL TARTRATE 15 UG/2ML
15 SOLUTION RESPIRATORY (INHALATION) 2 TIMES DAILY
Status: DISCONTINUED | OUTPATIENT
Start: 2022-09-28 | End: 2022-10-04 | Stop reason: HOSPADM

## 2022-09-28 RX ORDER — CASTOR OIL AND BALSAM, PERU 788; 87 MG/G; MG/G
OINTMENT TOPICAL 3 TIMES DAILY
COMMUNITY

## 2022-09-28 RX ORDER — PROMETHAZINE HYDROCHLORIDE 12.5 MG/1
12.5 TABLET ORAL EVERY 6 HOURS PRN
Status: DISCONTINUED | OUTPATIENT
Start: 2022-09-28 | End: 2022-10-04 | Stop reason: HOSPADM

## 2022-09-28 RX ORDER — LIDOCAINE 4 G/G
1 PATCH TOPICAL DAILY
Status: DISCONTINUED | OUTPATIENT
Start: 2022-09-28 | End: 2022-10-04 | Stop reason: HOSPADM

## 2022-09-28 RX ORDER — METOPROLOL SUCCINATE 100 MG/1
100 TABLET, EXTENDED RELEASE ORAL DAILY
Status: DISCONTINUED | OUTPATIENT
Start: 2022-09-28 | End: 2022-10-04 | Stop reason: HOSPADM

## 2022-09-28 RX ORDER — ACETAMINOPHEN 650 MG/1
650 SUPPOSITORY RECTAL EVERY 6 HOURS PRN
Status: DISCONTINUED | OUTPATIENT
Start: 2022-09-28 | End: 2022-10-04 | Stop reason: HOSPADM

## 2022-09-28 RX ORDER — FUROSEMIDE 40 MG/1
40 TABLET ORAL
Status: DISCONTINUED | OUTPATIENT
Start: 2022-09-30 | End: 2022-10-03

## 2022-09-28 RX ORDER — LEVOTHYROXINE SODIUM 0.07 MG/1
75 TABLET ORAL DAILY
Status: DISCONTINUED | OUTPATIENT
Start: 2022-09-28 | End: 2022-10-04 | Stop reason: HOSPADM

## 2022-09-28 RX ORDER — ALBUTEROL SULFATE 2.5 MG/3ML
2.5 SOLUTION RESPIRATORY (INHALATION) EVERY 6 HOURS PRN
Status: DISCONTINUED | OUTPATIENT
Start: 2022-09-28 | End: 2022-10-04 | Stop reason: HOSPADM

## 2022-09-28 RX ORDER — ARFORMOTEROL TARTRATE 15 UG/2ML
15 SOLUTION RESPIRATORY (INHALATION) 2 TIMES DAILY
Status: DISCONTINUED | OUTPATIENT
Start: 2022-09-28 | End: 2022-09-28

## 2022-09-28 RX ORDER — OXYCODONE HYDROCHLORIDE AND ACETAMINOPHEN 5; 325 MG/1; MG/1
1 TABLET ORAL EVERY 6 HOURS PRN
Status: DISCONTINUED | OUTPATIENT
Start: 2022-09-28 | End: 2022-10-04 | Stop reason: HOSPADM

## 2022-09-28 RX ORDER — FAMOTIDINE 20 MG/1
20 TABLET, FILM COATED ORAL DAILY
Status: DISCONTINUED | OUTPATIENT
Start: 2022-09-28 | End: 2022-10-04 | Stop reason: HOSPADM

## 2022-09-28 RX ORDER — DESMOPRESSIN ACETATE 0.1 MG/1
200 TABLET ORAL 2 TIMES DAILY
Status: DISCONTINUED | OUTPATIENT
Start: 2022-09-28 | End: 2022-10-04 | Stop reason: HOSPADM

## 2022-09-28 RX ORDER — FAMOTIDINE 20 MG/1
20 TABLET, FILM COATED ORAL 2 TIMES DAILY
Status: DISCONTINUED | OUTPATIENT
Start: 2022-09-28 | End: 2022-09-28

## 2022-09-28 RX ORDER — ONDANSETRON 2 MG/ML
4 INJECTION INTRAMUSCULAR; INTRAVENOUS EVERY 6 HOURS PRN
Status: DISCONTINUED | OUTPATIENT
Start: 2022-09-28 | End: 2022-10-04 | Stop reason: HOSPADM

## 2022-09-28 RX ORDER — SODIUM CHLORIDE 0.9 % (FLUSH) 0.9 %
5-40 SYRINGE (ML) INJECTION PRN
Status: DISCONTINUED | OUTPATIENT
Start: 2022-09-28 | End: 2022-10-04 | Stop reason: HOSPADM

## 2022-09-28 RX ORDER — PREDNISONE 1 MG/1
5 TABLET ORAL DAILY
Status: DISCONTINUED | OUTPATIENT
Start: 2022-09-28 | End: 2022-10-04 | Stop reason: HOSPADM

## 2022-09-28 RX ORDER — OMEGA-3-ACID ETHYL ESTERS 1 G/1
1 CAPSULE, LIQUID FILLED ORAL 2 TIMES DAILY
Status: DISCONTINUED | OUTPATIENT
Start: 2022-09-28 | End: 2022-10-04 | Stop reason: HOSPADM

## 2022-09-28 RX ORDER — FERROUS SULFATE 325(65) MG
325 TABLET ORAL 2 TIMES DAILY
Status: DISCONTINUED | OUTPATIENT
Start: 2022-09-28 | End: 2022-10-04 | Stop reason: HOSPADM

## 2022-09-28 RX ORDER — ESCITALOPRAM OXALATE 10 MG/1
10 TABLET ORAL DAILY
Status: DISCONTINUED | OUTPATIENT
Start: 2022-09-28 | End: 2022-10-04 | Stop reason: HOSPADM

## 2022-09-28 RX ORDER — FUROSEMIDE 10 MG/ML
20 INJECTION INTRAMUSCULAR; INTRAVENOUS ONCE
Status: COMPLETED | OUTPATIENT
Start: 2022-09-28 | End: 2022-09-28

## 2022-09-28 RX ORDER — IPRATROPIUM BROMIDE AND ALBUTEROL SULFATE 2.5; .5 MG/3ML; MG/3ML
1 SOLUTION RESPIRATORY (INHALATION)
Status: COMPLETED | OUTPATIENT
Start: 2022-09-28 | End: 2022-09-28

## 2022-09-28 RX ORDER — BUDESONIDE 0.5 MG/2ML
0.5 INHALANT ORAL 2 TIMES DAILY
Status: DISCONTINUED | OUTPATIENT
Start: 2022-09-28 | End: 2022-09-28

## 2022-09-28 RX ORDER — BUDESONIDE 0.5 MG/2ML
500 INHALANT ORAL 2 TIMES DAILY
Status: DISCONTINUED | OUTPATIENT
Start: 2022-09-28 | End: 2022-10-04 | Stop reason: HOSPADM

## 2022-09-28 RX ADMIN — FUROSEMIDE 20 MG: 10 INJECTION, SOLUTION INTRAMUSCULAR; INTRAVENOUS at 06:53

## 2022-09-28 RX ADMIN — IPRATROPIUM BROMIDE AND ALBUTEROL SULFATE 1 AMPULE: .5; 2.5 SOLUTION RESPIRATORY (INHALATION) at 05:18

## 2022-09-28 RX ADMIN — OXYCODONE AND ACETAMINOPHEN 1 TABLET: 5; 325 TABLET ORAL at 17:40

## 2022-09-28 RX ADMIN — METOPROLOL SUCCINATE 50 MG: 25 TABLET, EXTENDED RELEASE ORAL at 16:00

## 2022-09-28 RX ADMIN — DILTIAZEM HYDROCHLORIDE 30 MG: 30 TABLET, FILM COATED ORAL at 14:38

## 2022-09-28 RX ADMIN — IPRATROPIUM BROMIDE AND ALBUTEROL SULFATE 1 AMPULE: .5; 2.5 SOLUTION RESPIRATORY (INHALATION) at 05:20

## 2022-09-28 RX ADMIN — ARFORMOTEROL TARTRATE 15 MCG: 15 SOLUTION RESPIRATORY (INHALATION) at 21:18

## 2022-09-28 RX ADMIN — DILTIAZEM HYDROCHLORIDE 30 MG: 30 TABLET, FILM COATED ORAL at 20:42

## 2022-09-28 RX ADMIN — SODIUM CHLORIDE, PRESERVATIVE FREE 10 ML: 5 INJECTION INTRAVENOUS at 20:43

## 2022-09-28 RX ADMIN — IPRATROPIUM BROMIDE AND ALBUTEROL SULFATE 1 AMPULE: .5; 2.5 SOLUTION RESPIRATORY (INHALATION) at 05:22

## 2022-09-28 RX ADMIN — METOPROLOL SUCCINATE 100 MG: 100 TABLET, FILM COATED, EXTENDED RELEASE ORAL at 14:38

## 2022-09-28 RX ADMIN — FERROUS SULFATE TAB 325 MG (65 MG ELEMENTAL FE) 325 MG: 325 (65 FE) TAB at 20:42

## 2022-09-28 RX ADMIN — ALBUTEROL SULFATE 2.5 MG: 2.5 SOLUTION RESPIRATORY (INHALATION) at 21:18

## 2022-09-28 RX ADMIN — PREDNISONE 5 MG: 5 TABLET ORAL at 20:44

## 2022-09-28 RX ADMIN — DESMOPRESSIN ACETATE 200 MCG: 0.1 TABLET ORAL at 21:38

## 2022-09-28 RX ADMIN — OXYCODONE AND ACETAMINOPHEN 1 TABLET: 5; 325 TABLET ORAL at 23:45

## 2022-09-28 RX ADMIN — BUDESONIDE 500 MCG: 0.5 SUSPENSION RESPIRATORY (INHALATION) at 21:18

## 2022-09-28 RX ADMIN — OMEGA-3-ACID ETHYL ESTERS 1 G: 1 CAPSULE, LIQUID FILLED ORAL at 20:42

## 2022-09-28 ASSESSMENT — ENCOUNTER SYMPTOMS
WHEEZING: 0
APNEA: 0
COLOR CHANGE: 0
DIARRHEA: 0
ABDOMINAL PAIN: 0
COUGH: 0
CONSTIPATION: 0
CHEST TIGHTNESS: 1
SHORTNESS OF BREATH: 1
ABDOMINAL DISTENTION: 0
NAUSEA: 0
VOMITING: 0
BACK PAIN: 1

## 2022-09-28 ASSESSMENT — PAIN DESCRIPTION - DESCRIPTORS
DESCRIPTORS: THROBBING;SORE;DISCOMFORT
DESCRIPTORS: DISCOMFORT;SORE;SHARP
DESCRIPTORS: DISCOMFORT
DESCRIPTORS: ACHING

## 2022-09-28 ASSESSMENT — PAIN DESCRIPTION - ORIENTATION
ORIENTATION: LOWER

## 2022-09-28 ASSESSMENT — PAIN SCALES - GENERAL
PAINLEVEL_OUTOF10: 0
PAINLEVEL_OUTOF10: 9
PAINLEVEL_OUTOF10: 10

## 2022-09-28 ASSESSMENT — PAIN DESCRIPTION - PAIN TYPE: TYPE: ACUTE PAIN

## 2022-09-28 ASSESSMENT — PAIN DESCRIPTION - LOCATION
LOCATION: BACK
LOCATION: ABDOMEN;BACK
LOCATION: BACK
LOCATION: BACK

## 2022-09-28 ASSESSMENT — PAIN - FUNCTIONAL ASSESSMENT: PAIN_FUNCTIONAL_ASSESSMENT: 0-10

## 2022-09-28 NOTE — LETTER
[address] [facility] Encounter Date/Time: 2022 [address] [facility] Account: [de-identified]    MRN: [id number]    Patient: Bjorn Wetzel    Contact Serial #: [id number]      ENCOUNTER          Patient Class: I Private Enc? No Unit RM BD: Shawn Blunt [facility] 6501/6501-A   Hospital Service: MED   Encounter DX: Acute respiratory failur*   ADM Provider: Teot Farrar MD   Procedure:     ATT Provider: Teto Farrar MD   REF Provider:        Admission DX: Acute respiratory failure with hypoxia ([address] Congestive heart failure, unspecified HF chronicity, unspecified heart failure type ([address] and DX codes: J96.01, I50.9      PATIENT                 Name: Bjorn Wetzel : 1955 (66 yrs)   Address: [place] [address] Sex: Female   [facility] [address]         Marital Status:    Employer: DISABLED         Temple: [address]   Primary Care Provider: Oh Escobar MD         Primary Phone: [phone]   EMERGENCY CONTACT   Contact Name Legal Guardian? Relationship to Patient Home Phone Work Phone   1. [address] [employer]   2. *No Contact Specified*      Spouse    [phone]                 GUARANTOR            Guarantor: Bjorn Wetzel     : 1955   Address: [place] [address] Sex: Female   [place] [address]     Relation to Patient: Self       Home Phone: [phone]   Guarantor ID: [id number]       Work Phone:     Guarantor Employer: DISABLED         Status: DISABLED      COVERAGE        PRIMARY INSURANCE   Payor: [place] Department Medicaid  CERTIFICATION OF NECESSITY  FOR NON-EMERGENCY TRANSPORTATION   BY GROUND AMBULANCE      Individual Information   1. Name: Bjorn Wetzel 2. [place] Medicaid Billing Number:    3. Address: [address] [place] [address]      Transportation Provider Information   4. Provider Name:    5. [place] Medicaid Provider Number:  National Provider Identifier (NPI):      Certification  7.  Criteria:  During transport, this individual requires:  [x]

## 2022-09-28 NOTE — H&P
[facility] - [facility] Resident Inpatient  History and Physical    CC: Shortness of breath    HPI: History obtained from patient. Janice Lee is a 73 y.o. female with a PMH of COPD, sarcoidosis, CHF, pulmonary hypertension, A. fib, chronic pain disorder, chronic opioid use, who presents to ED for Shortness of Breath. She is on 4L O2 chronically and intermittent BIPAP. Patient was just discharged from hospital yesterday and was sent to [facility]. She was in the hospital for rectal bleeding. She states that while she was at [facility] the staff did not know how to adjust her BiPAP settings. Due to this she became increasingly more short of breath as the day went on. The shortness of breath was accompanied by chest tightness and increased sputum production. She states that she asked for a breathing treatment but was given her inhaler to use x1. She did receive her Lasix dose yesterday. She states that since starting Lasix her lower extremity edema has been improved. Last exercise stress test from October 2016 demonstrated normal LV wall motion, myocardial thickening during systole and EF 76%. Last echo from 7/15/2022 showing EF of 60%, moderate left ventricular concentric hypertrophy, dilated right ventricle with reduced function and severer pulmonary hypertension. CT from 9/10/2022 demonstrating opacities in lung bases suggestive of subsegmental atelectasis and chronic interstitial lung disease. Since arriving to the ED, the patient has been placed on BiPAP and given 20 mg IV Lasix. She states that her shortness of breath has drastically improved due to this. Attempted to be placed on 4 L NC but had O2 sats of 81% and was placed back on BiPAP. She is also complaining of back pain and is asking for Percocet. She states that she has been asking for pain medication for 2-3 days but has not been given any. Denies dizziness, headache, chest pain, chest tightness, wheezing, nausea/vomiting/diarrhea.  Patient is a former smoker. ED course:  Vitals: RR 24, /134  ProBNP: 6282 (Last 6780)  Troponin: 17  ABG: Metabolic Alkalosis with compensated respiratory acidosis  CXR: Patchy bilateral airspace opacities suggesting atelectasis or mild multi-lobar pneumonitis       PMH:  has a past medical history of A-fib ([address] Able to transfer from chair to wheelchair, Abnormal mammogram, Acute on chronic respiratory failure ([address] Anemia, Anemia due to chronic illness, Ankle fracture, left, 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[address] Backache, Benign hypertension, CHF (congestive heart failure) ([facility]), Chronic back pain, Chronic kidney disease, Chronic pain disorder, Chronic, continuous use of opioids, Compression fracture of thoracic vertebra ([facility]), COPD (chronic obstructive pulmonary disease) ([address] Debility, Deformity of ankle and foot, acquired, Depression, Diabetes insipidus ([address] Diverticulitis, Dry eyes, Encephalopathy acute, Gait disturbance, GERD (gastroesophageal reflux disease), Hemorrhoids, Hernia, History of blood transfusion, History of Clostridium difficile, Hyperlipidemia, Hypothyroidism, Ischemic colitis, enteritis, or enterocolitis ([address] Long term (current) use of systemic steroids, Long-term current use of steroids, Lumbar disc herniation, Myalgia and myositis, unspecified, Nephrosclerosis, Nonunion of fracture, Osteoarthritis, PAF (paroxysmal atrial fibrillation) ([facility]), 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[address] Pulmonary hypertension ([address] Pulmonary sarcoidosis ([address] Rectal bleeding, Rhabdomyolysis, Steroid-induced avascular necrosis of shoulder ([address] Tibia fracture, and Ventral hernia without obstruction or gangrene. PSH:  has a past surgical history that includes fracture surgery (2010); Kyphosis surgery; Lumbar disc surgery; Tibia / fibia lengthening; other surgical history (11/1/11); Abdomen surgery (2008); Echo Complete (4/22/2013); ECHO Compl W Dop Color Flow (8/16/2015);  Upper gastrointestinal endoscopy (1/4/2016); Hemorrhoid surgery (12/08/2016); Colonoscopy (2008); Colonoscopy (04/11/2014); Colonoscopy (11/23/2015); Colonoscopy (10/24/2016); Colonoscopy (07/26/2017); Upper gastrointestinal endoscopy (07/26/2017); sigmoidoscopy (N/A, 3/19/2021); and other surgical history (12/14/2021). FH: family history includes Alcohol Abuse in her sister; Arthritis in her father and mother; Diabetes in her father and mother; High Blood Pressure in her father, mother, and sister; Substance Abuse in her brother and sister. Social:  reports that she quit smoking about 26 years ago. Her smoking use included cigarettes. She started smoking about 51 years ago. She has a 18.75 pack-year smoking history. She has never used smokeless tobacco. She reports that she does not currently use drugs. She reports that she does not drink alcohol. Allergies: No Known Allergies     Home Medications:   No current facility-administered medications on file prior to encounter. Current Outpatient Medications on File Prior to Encounter   Medication Sig Dispense Refill    Balsam [place] Oil (VENELEX) OINT ointment Apply topically 3 times daily      famotidine (PEPCID) 20 MG tablet Take 1 tablet by mouth daily 60 tablet 0    escitalopram (LEXAPRO) 10 MG tablet Take 1 tablet by mouth daily 30 tablet 0    furosemide (LASIX) 40 MG tablet Take 40 mg by mouth three times a week      metoprolol succinate (TOPROL XL) 100 MG extended release tablet Take 100 mg by mouth daily Given with Toprol xl 50 mg total dose 150 mg      metoprolol succinate (TOPROL XL) 50 MG extended release tablet Take 50 mg by mouth daily Given with Toprol xl 100 mg total dose 150 mg      promethazine-codeine (PHENERGAN WITH CODEINE) 6.25-10 MG/5ML syrup Take 5 mLs by mouth 4 times daily as needed for Cough for up to 60 days. 473 mL 1    omega-3 acid ethyl esters (LOVAZA) 1 g capsule Take 1 capsule by mouth in the morning and 1 capsule before bedtime.  60 capsule 2 OXYGEN Inhale 2 L/min into the lungs as needed (shortness of breath, low oxygen <90%) BMS 1 Canister 3    dilTIAZem (CARDIZEM) 30 MG tablet Take 1 tablet by mouth in the morning and 1 tablet at noon and 1 tablet before bedtime. 120 tablet 3    levothyroxine (SYNTHROID) 75 MCG tablet Take 1 tablet by mouth daily 30 tablet 5    ferrous sulfate (IRON 325) 325 (65 Fe) MG tablet Take 1 tablet by mouth 2 times daily 60 tablet 5    predniSONE (DELTASONE) 5 MG tablet Take 1 tablet by mouth daily 30 tablet 5    cycloSPORINE (RESTASIS) 0.05 % ophthalmic emulsion Place 1 drop into both eyes every 12 hours 5.5 mL 1    desmopressin (DDAVP) 0.1 mg tablet Take 2 tablets by mouth 2 times daily 120 tablet 2    albuterol sulfate HFA (PROVENTIL HFA) 108 (90 Base) MCG/ACT inhaler Inhale 2 puffs into the lungs every 6 hours as needed for Wheezing or Shortness of Breath 1 each 5    BREO ELLIPTA 100-25 MCG/INH AEPB inhaler Inhale 1 puff into the lungs daily 28 each 5    albuterol (PROVENTIL) (2.5 MG/3ML) 0.083% nebulizer solution Take 3 mLs by nebulization every 6 hours as needed for Wheezing or Shortness of Breath 120 each 2    [DISCONTINUED] mupirocin (BACTROBAN) 2 % ointment Apply topically daily 30 g 2       ROS:  Review of Systems   Constitutional:  Negative for appetite change, chills, diaphoresis, fatigue, fever and unexpected weight change. Respiratory:  Positive for chest tightness and shortness of breath. Negative for apnea, cough and wheezing. Cardiovascular:  Negative for chest pain, palpitations and leg swelling. Gastrointestinal:  Negative for abdominal distention, abdominal pain, constipation, diarrhea, nausea and vomiting. Genitourinary:  Negative for difficulty urinating. Musculoskeletal:  Positive for arthralgias and back pain. Skin:  Negative for color change. Neurological:  Negative for dizziness, tremors, syncope, weakness, light-headedness and headaches.    Psychiatric/Behavioral:  Negative for confusion. PE:  Blood pressure (!) 157/118, pulse 98, temperature 98.2 °F (36.8 °C), resp. rate 24, height 5' (1.524 m), weight 184 lb (83.5 kg), SpO2 99 %, not currently breastfeeding. Physical Exam  Vitals reviewed. Constitutional:       General: She is not in acute distress. Appearance: She is obese. She is not ill-appearing or toxic-appearing. Comments: Bipap in place    Cardiovascular:      Rate and Rhythm: Rhythm irregular. Pulmonary:      Effort: Pulmonary effort is normal.      Breath sounds: Normal breath sounds. Musculoskeletal:      Right lower leg: No edema. Left lower leg: No edema. Left foot: Deformity present. Neurological:      Mental Status: She is easily aroused. Psychiatric:         Behavior: Behavior is cooperative.          Labs:   Results for orders placed or performed during the hospital encounter of 09/28/22   CBC with Auto Differential   Result Value Ref Range    WBC 6.0 4.5 - 11.5 E9/L    RBC 3.84 3.50 - 5.50 E12/L    Hemoglobin 9.6 (L) 11.5 - 15.5 g/dL    Hematocrit 32.1 (L) 34.0 - 48.0 %    MCV 83.6 80.0 - 99.9 fL    MCH 25.0 (L) 26.0 - 35.0 pg    MCHC 29.9 (L) 32.0 - 34.5 %    RDW 16.8 (H) 11.5 - 15.0 fL    Platelets 798 623 - 131 E9/L    MPV 10.8 7.0 - 12.0 fL    Neutrophils % 87.8 (H) 43.0 - 80.0 %    Lymphocytes % 6.1 (L) 20.0 - 42.0 %    Monocytes % 2.6 2.0 - 12.0 %    Eosinophils % 3.5 0.0 - 6.0 %    Basophils % 0.2 0.0 - 2.0 %    Neutrophils Absolute 5.28 1.80 - 7.30 E9/L    Lymphocytes Absolute 0.36 (L) 1.50 - 4.00 E9/L    Monocytes Absolute 0.18 0.10 - 0.95 E9/L    Eosinophils Absolute 0.21 0.05 - 0.50 E9/L    Basophils Absolute 0.00 0.00 - 0.20 E9/L    Anisocytosis 1+     Polychromasia 1+     Hypochromia 3+     Poikilocytes 1+     Ovalocytes 1+    Comprehensive Metabolic Panel   Result Value Ref Range    Sodium 138 132 - 146 mmol/L    Potassium 3.8 3.5 - 5.0 mmol/L    Chloride 93 (L) 98 - 107 mmol/L    CO2 40 (H) 22 - 29 mmol/L    Anion Gap 5 (L) 7 - 16 mmol/L    Glucose 77 74 - 99 mg/dL    BUN 15 6 - 23 mg/dL    Creatinine 0.8 0.5 - 1.0 mg/dL    GFR Non-African American >60 >=60 mL/min/1.73    GFR African American >60     Calcium 9.8 8.6 - 10.2 mg/dL    Total Protein 6.8 6.4 - 8.3 g/dL    Albumin 3.8 3.5 - 5.2 g/dL    Total Bilirubin 0.6 0.0 - 1.2 mg/dL    Alkaline Phosphatase 114 (H) 35 - 104 U/L    ALT 27 0 - 32 U/L    AST 32 (H) 0 - 31 U/L   Troponin   Result Value Ref Range    Troponin, High Sensitivity 17 (H) 0 - 9 ng/L   Brain Natriuretic Peptide   Result Value Ref Range    Pro-BNP 6,282 (H) 0 - 125 pg/mL   Blood Gas, Arterial   Result Value Ref Range    Date Analyzed [id number]     Time Analyzed 0545     Source: Blood Arterial     pH, Blood Gas 7.460 (H) 7.350 - 7.450    PCO2 53.4 (H) 35.0 - 45.0 mmHg    PO2 124.0 (H) 75.0 - 100.0 mmHg    HCO3 37.1 (H) 22.0 - 26.0 mmol/L    B.E. 11.7 (H) -3.0 - 3.0 mmol/L    O2 Sat 98.7 (H) 92.0 - 98.5 %    PO2/FIO2 1.24 mmHg/%    O2Hb 97.6 (H) 94.0 - 97.0 %    COHb 0.7 0.0 - 1.5 %    MetHb 0.4 0.0 - 1.5 %    O2 Content 14.6 mL/dL    HHb 1.3 0.0 - 5.0 %    tHb (est) 10.5 (L) 11.5 - 16.5 g/dL    Mode AVAPS     FIO2 100.0 %    Rr Mechanical 16.0 b/min    Vt Mechanical 450.0 mL    Peep/Cpap 6.0 cmH2O    Date Of Collection      Time Collected      Pt Temp 37.0 C     ID [id number]     Lab [id number]     Critical(s) Notified . No Critical Values        Imaging:  XR CHEST (2 VW)    Result Date: 9/16/2022  EXAMINATION: TWO XRAY VIEWS OF THE CHEST 9/16/2022 3:58 pm COMPARISON: None. HISTORY: ORDERING SYSTEM PROVIDED HISTORY: dyspnea TECHNOLOGIST PROVIDED HISTORY: Reason for exam:->dyspnea What reading provider will be dictating this exam?->CRC FINDINGS: AP and lateral views of the chest demonstrate bilateral pleural effusions with prominent pulmonary vascularity and moderate cardiomegaly. There are bilateral Kerley B lines with no evidence of a pneumothorax. Cardiomegaly with moderate interstitial pulmonary edema. CT ABDOMEN PELVIS W IV CONTRAST Additional Contrast? Radiologist Recommendation    Result Date: 9/15/2022  EXAMINATION: CT OF THE ABDOMEN AND PELVIS WITH CONTRAST 9/15/2022 2:14 pm TECHNIQUE: CT of the abdomen and pelvis was performed with the administration of intravenous contrast. Multiplanar reformatted images are provided for review. Automated exposure control, iterative reconstruction, and/or weight based adjustment of the mA/kV was utilized to reduce the radiation dose to as low as reasonably achievable. COMPARISON: None. HISTORY: ORDERING SYSTEM PROVIDED HISTORY: abdominal pain-LLQ, diarrhea TECHNOLOGIST PROVIDED HISTORY: Reason for exam:->abdominal pain-LLQ, diarrhea Additional Contrast?->Radiologist Recommendation What reading provider will be dictating this exam?->CRC FINDINGS: Lower Chest: Ground-glass opacities, interstitial opacities compatible with interstitial lung disease unchanged. Pulmonary artery is enlarged suggestive of pulmonary artery hypertension. Stable consolidation and blood formation in the lung bases anteriorly. Organs: Liver and spleen are normal in size without focal lesion. Stomach has a normal configuration. Pancreas appears normal.  Gallbladder is elongated but otherwise normal, no visible radiopaque calculi. The adrenal glands and kidneys are within normal limits. GI/Bowel: Gas fluid levels are noted throughout the colon. Postop changes are noted in the right colon compatible previous right hemicolectomy. There is sigmoid diverticulosis without diverticulitis. No free air free fluid. Stable ventral hernia. Pelvis: No free pelvic fluid or mass. 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 No pelvic mass or adenopathy identified. Uterus and ovaries appears normal. Peritoneum/Retroperitoneum: No retroperitoneal mass or adenopathy. Bones/Soft Tissues: Right convexity lumbar rotoscoliosis.   Unchanged partial fusion of the right aspect of the disc space at L4-5. Degenerative and vacuum changes at L3-4. Prior vertebroplasty at T10.     1.  Gas fluid levels within the colon may reflect a diarrheal process. 2.  Postop changes in the abdomen as described. No evidence of bowel obstruction. No free air or free fluid. 3.  Stable lung base opacities and chronic interstitial lung disease. CT ABDOMEN PELVIS W IV CONTRAST Additional Contrast? None    Result Date: 9/10/2022  EXAMINATION: CT OF THE ABDOMEN AND PELVIS WITH CONTRAST 9/10/2022 5:12 pm TECHNIQUE: CT of the abdomen and pelvis was performed with the administration of intravenous contrast. Multiplanar reformatted images are provided for review. Automated exposure control, iterative reconstruction, and/or weight based adjustment of the mA/kV was utilized to reduce the radiation dose to as low as reasonably achievable. COMPARISON: July 14, 2022 HISTORY: ORDERING SYSTEM PROVIDED HISTORY: Abdominal pain, prior abdominal hernia repair TECHNOLOGIST PROVIDED HISTORY: Additional Contrast?->None Reason for exam:->Abdominal pain, prior abdominal hernia repair Decision Support Exception - unselect if not a suspected or confirmed emergency medical condition->Emergency Medical Condition (MA) FINDINGS: Redemonstration of broad-based ventral abdominal wall hernia measuring approximately 9 cm in AP dimension with base of approximately 13 cm in axial dimension. Multiple segments of bowel and mesenteric fat are present within herniation sac. No bowel obstruction or strangulation. Multiple diverticula involving the left colon. Postsurgical changes related to bowel resection present in right lower abdomen. The appendix is not definitively visualized, however no focal inflammatory changes in its expected location. No intrahepatic or extrahepatic bile duct dilatation. Multiple tiny gallstones layer in the gallbladder. No evidence of acute pancreatitis. Spleen is normal in size.   Adrenal glands are normal.  No hydronephrosis or perinephric edema. No retroperitoneal lymphadenopathy. Urinary bladder is grossly unremarkable yet not optimally distended. Irregular opacities are present in the lung bases slightly decreased compared to prior. 1. Stable ventral abdominal wall hernia. 2. No bowel obstruction, free air, or free fluid. 3. Diverticulosis 4. Cholelithiasis 5. Redemonstration of opacities in lung bases suggestive of subsegmental atelectasis and chronic interstitial lung disease. XR CHEST PORTABLE    Result Date: 9/28/2022  EXAMINATION: ONE XRAY VIEW OF THE CHEST 9/28/2022 6:09 am COMPARISON: None. HISTORY: ORDERING SYSTEM PROVIDED HISTORY: sob TECHNOLOGIST PROVIDED HISTORY: Reason for exam:->sob What reading provider will be dictating this exam?->CRC FINDINGS: Unchanged appearance of shallow inspiration and patchy bilateral airspace opacities suggesting atelectasis or multi lobar pneumonitis. No effusion or pneumothorax. Unchanged appearance from the day prior of shallow inspiration and patchy bilateral airspace opacities suggesting atelectasis or mild multi lobar pneumonitis. RECOMMENDATION: Careful clinical correlation and follow up recommended. XR CHEST PORTABLE    Result Date: 9/26/2022  EXAMINATION: ONE XRAY VIEW OF THE CHEST 9/26/2022 12:44 pm COMPARISON: 09/20/2022 HISTORY: ORDERING SYSTEM PROVIDED HISTORY: sob,cough TECHNOLOGIST PROVIDED HISTORY: Reason for exam:->sob,cough What reading provider will be dictating this exam?->CRC FINDINGS: Compared to the prior exam, there is diffusely increased right lung opacity. Increased blunting of the costophrenic angles is noted. The heart is enlarged. Bones are stable. Diffusely increased right lung opacity, which may represent asymmetric pulmonary edema, but infection not excluded. Probable bilateral small pleural effusions.      XR CHEST PORTABLE    Result Date: 9/20/2022  EXAMINATION: ONE XRAY VIEW OF THE CHEST 9/20/2022 3:07 pm COMPARISON: None. HISTORY: ORDERING SYSTEM PROVIDED HISTORY: R/o pulmonary edema TECHNOLOGIST PROVIDED HISTORY: Reason for exam:->R/o pulmonary edema What reading provider will be dictating this exam?->CRC FINDINGS: The lungs are without acute focal process. There is no effusion or pneumothorax. Cardiomegaly. The osseous structures are without acute process. Degenerative changes left glenohumeral joint. No acute process. Stable cardiomegaly. XR CHEST PORTABLE    Result Date: 9/18/2022  EXAMINATION: ONE XRAY VIEW OF THE CHEST 9/18/2022 8:59 am COMPARISON: 09/16/2022 HISTORY: ORDERING SYSTEM PROVIDED HISTORY: pulmonary edema TECHNOLOGIST PROVIDED HISTORY: Reason for exam:->pulmonary edema What reading provider will be dictating this exam?->CRC FINDINGS: No pneumothorax. .  The cardiomediastinal silhouette is without acute process. The osseous structures are without acute process. Stable heart size with mild pulmonary vascular congestive changes. Stable heart size with mild pulmonary vascular congestive changes. Assessment and Plan  Principal Problem:    Acute respiratory failure with hypoxia (HCC)  Resolved Problems:    * No resolved hospital problems.  *      Acute Hypoxic Respiratory Failure  -Patient with Sarcoid, COPD, CHF  -DUONEB, Pulmicort, Brovana  -Continue on Bipap  -Continue home dose of Lasix 40mg 3 times weekly (Patient received dose of 20mg in ED today and received 40mg yesterday)  -Patient will need placement to a facility that can manage her bipap settings   -ABG: Metabolic Alkalosis with compensated respiratory acidosis  -CXR: Patchy bilateral airspace opacities suggesting atelectasis or mild multi-lobar pneumonitis   -Pulmonary consult   -Sputum culture   -Add flutter valve   -Order Vitamin D level, procalcitonin  -Order urine drug screen  -Will need sleep study outpatient   -Telemetry    Sarcoidosis  -Continue on Prednisone 5mg daily    Elevated troponin  -Downtrending  -Will not repeat at this time  -Telemetry    Atrial Fibrillation  -Continue Toprol XL (100+50mg)  -Continue on Cardizem 30mg TID   -Patient has been on Eliquis in the past but no longer on it. Discontinue for now due to recent GI bleed    Chronic Pain  -Patient on opioids chronically. -Need to discuss pain management plan. Per PDMP patient's opioid use has decreased   -Add lidocaine patch and Diclofenac gel   -Order urine drug screen    Diabetes Insipidus  -Likely secondary to Sarcoidosis  -Continue home dose     Chronic Anemia  -Likely due to chronic disease  -Continue on ferrous sulfate 325 BID     Hypothyroidism  -Continue home Levothyroxine 75    Depression  -Continue home Lexapro 10mg     GERD  -Continue home Pepcid    PT/OT: Consult  GI: Pepcid  DVT prophylaxis: PCDs  Dispo:  Admit while awaiting placement   Diet: Low sodium      Electronically signed by Marco Sanchez DO on 9/28/2022 at 7:41 AM  This case was discussed with attending physician, Dr. Adolph Pierre

## 2022-09-28 NOTE — LETTER
[address] Medicaid  CERTIFICATION OF NECESSITY  FOR TRANSPORTATION   BY WHEELCHAIR VAN     Individual Information   1. Name: Alex Wyatt 2. [place] Medicaid Billing Number: UHC MCR DUAL   3. Address: [address] [place] [address]      Transportation Provider Information   4. Provider Name: [facility]   5. [place] Medicaid Provider Number:  National Provider Identifier (NPI):      Certification  7. Criteria:  By signing this document, the practitioner certifies that two statements are true:  A. This individual must be accompanied by a mobility-related assistive device from the point of pick-up to the point of drop-off. B. Transport of this individual by standard passenger vehicle or common carrier is precluded or contraindicated. 8. Period Beginning Date:10/4/2022   9. Length  [x] Not more than 1 day(s)  [] One Year     Additional Information Relevant to Certification   10. Comments or Explanations, If Necessary or Appropriate          Certifying Practitioner Information   11. Name of Practitioner: Amarilys Stearns MD   12. [place] Medicaid Provider Number, If Applicable: *** 13. National Provider Identifier (NPI): ***     Signature Information   14. Date of Signature: 10/4/2022 15. Name of Person Signing:[facility]   16. Signature and Professional Designation: Electronically signed by JIGAR Suh on 10/4/22 at 8:34 AM EDT         ODM [id number]  Rev. 7/2015   [place] Department Medicaid  CERTIFICATION OF NECESSITY  FOR TRANSPORTATION   BY WHEELCHAIR VAN     Individual Information   1. Name: Alex Wyatt 2. [place] Medicaid Billing Number: ***   3. Address: [address] [place] [address]      Transportation Provider Information   4. Provider Name: ***   5. [place] Medicaid Provider Number: *** National Provider Identifier (NPI): ***     Certification  7. Criteria:  By signing this document, the practitioner certifies that two statements are true:  A.  This individual must be accompanied by a mobility-related assistive device from the point of pick-up to the point of drop-off. B. Transport of this individual by standard passenger vehicle or common carrier is precluded or contraindicated. 8. Period Beginning Date: ***   9. Length  [] Not more than {#:30777} day(s)  [] One Year     Additional Information Relevant to Certification   10. Comments or Explanations, If Necessary or Appropriate     ***     Certifying Practitioner Information   11. Name of Practitioner: ***   12. [place] Medicaid Provider Number, If Applicable: *** 13. National Provider Identifier (NPI): ***     Signature Information   14. Date of Signature: *** 15. Name of Person Signing: ***   12. Signature and Professional Designation: ***     [facility] [id number]  Rev. 2015        [address] [place] [address] Encounter Date/Time: 2022 [address] [place] [address] Account: [de-identified]    MRN: [id number]    Patient: Bhaskar Cr    Contact Serial #: [id number]      ENCOUNTER          Patient Class: I Private Enc? No Unit RM BD: [facility] 6501/6501-A   Hospital Service: MED   Encounter DX: Acute respiratory failur*   ADM Provider: Fei Felipe MD   Procedure:     ATT Provider: Fei Felipe MD   REF Provider:        Admission DX: Acute respiratory failure with hypoxia ([facility] Congestive heart failure, unspecified HF chronicity, unspecified heart failure type ([facility] [address] [facility] and DX codes: J96.01, I50.9      PATIENT                 Name: Bhaskar Cr : 1955 (66 yrs)   Address: [place] [address] Sex: Female   [facility] [address]         Marital Status:    Employer: DISABLED         Jewish: [address]   Primary Care Provider: Samantha Lea MD         Primary Phone: [phone]   EMERGENCY CONTACT   Contact Name Legal Guardian? Relationship to Patient Home Phone Work Phone   1. [facility]  2. *No Contact Specified*      Spouse    [phone]                 GUARANTOR            Guarantor: Bhaskar Cr     : 1955 Address: [address] [address] Sex: Female   [place] [address]     Relation to Patient: Self       Home Phone: [phone]   Guarantor ID: [id number]       Work Phone:     Guarantor Employer: DISABLED         Status: DISABLED      COVERAGE        PRIMARY INSURANCE   Payor: [facility] MEDICARE Plan: Theresa LAMAS*   Payor Address: ,          Group Number: [id number] Insurance Type: INDEMNITY   Subscriber Name: Saman Crow : 1955   Subscriber ID: [id number] Pat. Rel. to Sub: Self   SECONDARY INSURANCE   Payor:   Plan:     Payor Address:  ,           Group Number:   Insurance Type:     Subscriber Name:   Subscriber :     Subscriber ID:   Pat.  Rel. to Sub:           CSN: [id number]

## 2022-09-28 NOTE — PROGRESS NOTES
[facility] Attending    S: 73 y.o. female with  a history of afib (off of anticoagulation due to recent gi bleeding), HFpEF, chronic hypoxic respiratory failure on 6L NC, COPD, DI, ciff, htn, hypothyroidism, VAIN III, ischemic colitis, severe pulmonary hypertension, sarcoidosis, ventral hernia, fibromyalgia, avn on chronic opioid therapy who presented for hypoxia. Patient was admitted with gi bleed and uti and discharged on 9/27 to SNF. There found to sat 81% on NC and required NRB (trouble with bipap setup) so sent to ER. Is on lasix for leg edema as well. Pt given lasix, duonebs and bipap in ER with improvement of acid/base disorders on abg.      O: VS- Blood pressure (!) 157/118, pulse 98, temperature 98.2 °F (36.8 °C), resp. rate 24, height 5' (1.524 m), weight 184 lb (83.5 kg), SpO2 99 %, not currently breastfeeding. Exam is as noted by resident with the following changes, additions or corrections:  Gen: NAD , drowsy  HEENT: NCAT, on bipap machine  CV-RRR   Lungs-Coarse bs b/l  Ext-no C/C; tr edema b/l      Impressions:   Principal Problem:    Acute respiratory failure with hypoxia ([address]  Resolved Problems:    * No resolved hospital problems. *      Plan:   Wean oxygen as tolerated. Bipap. Continue chronic prednisone therapy. Lasix fo hfpef. Pulm consulstation pending. Sputum cx. Breo. Bp control. UDS with chronic opiates. DDAVP for hx of DI. Possible outpatient Watchman evaluation. Attending Physician Statement  I have reviewed the chart and seen the patient with the resident(s). I personally reviewed images, EKG's and similar tests, if present. I personally reviewed and performed key elements of the history and exam.  I have reviewed and confirmed student and/or resident history and exam with changes as indicated above. I agree with the assessment, plan and orders as documented by the resident.   Please refer to the resident and/or student note for additional information.       Bell Bourne MD

## 2022-09-28 NOTE — PROGRESS NOTES
Nohemi Villalobos M.D.,[facility]  Alina Blair D.O., F.A.C.O.I., Akhil Love M.D. Humberto Hoang M.D. Lawson Martinez D.O. Daily Pulmonary Progress Note    Patient:  Cecily Styles 73 y.o. female MRN: [id number]     Date of Service: 2022      Synopsis     We are following patient for respiratory failure, sarcoidosis     \"CC\"  rectal bleeding    Code status: FULL       Subjective    [facility] to [facility] . Full consult not needed. Patient was seen and examined in ER. Laying in bed on Bipap in NAD. Attempted on 4L NC and SpO2 dropped to 74%, placed back on Bipap. Care discussed with  at bedside. He states her AVAPs machine did not arrive until 3 hours after she got to the nursing home and then the mask took another 2 hours to arrive. He states her medications did not arrive until 2am.     AB.49/54.5/148/40.8/99.1% on AVAPS FiO2 90%    Review of Systems:  C/o shortness of breath  Denies headache  Denies N/V/D  Denies dizzines  +cough    24-hour events:  readmit    Objective   Vitals: BP (!) 157/118   Pulse 98   Temp 98.2 °F (36.8 °C)   Resp 24   Ht 5' (1.524 m)   Wt 184 lb (83.5 kg)   LMP  (LMP Unknown)   SpO2 99%   BMI 35.94 kg/m²     I/O:  No intake or output data in the 24 hours ending 22 1406          CPAP/EPAP: 6 cmH2O     CURRENT MEDS :  Scheduled Meds:   desmopressin  200 mcg Oral BID    dilTIAZem  30 mg Oral TID    escitalopram  10 mg Oral Daily    famotidine  20 mg Oral Daily    ferrous sulfate  325 mg Oral BID    levothyroxine  75 mcg Oral Daily    metoprolol succinate  100 mg Oral Daily    metoprolol succinate  50 mg Oral Daily    lidocaine  1 patch TransDERmal Daily    diclofenac sodium  4 g Topical BID       Physical Exam:  General Appearance: appears comfortable in no acute distress.    HEENT: Normocephalic atraumatic without obvious abnormality   Neck: Lips, mucosa, and tongue normal. Supple, symmetrical, trachea midline, no adenopathy;thyroid: no enlargement/tenderness/nodules or JVD. Lung: Breath sounds diminished. Respirations unlabored. Symmetrical expansion. Heart: irregular 102  Abdomen: Soft, NT, ND. BS present x 4 quadrants. No bruit or organomegaly. Extremities: Pedal pulses 2+ symmetric b/l. Extremities normal, no cyanosis, clubbing, or edema. Musculokeletal: No joint swelling, no muscle tenderness. ROM normal in all joints of extremities. Neurologic: Mental status: Alert and oriented     Pertinent/ New Labs and Imaging Studies     Imaging Personally Reviewed:  CXR 9/28  Unchanged appearance from the day prior of shallow inspiration and patchy   bilateral airspace opacities suggesting atelectasis or mild multi lobar   pneumonitis. Echo 7/16/22   Ejection fraction is visually estimated at 60%. No regional wall motion abnormalities seen. Moderate left ventricular concentric hypertrophy noted. Dilated right ventricle with reduced function. Left atrial volume index of 34 ml per meters squared BSA. Moderate mitral regurgitation is present. Moderate tricuspid regurgitation. Pulmonary hypertension is severe. RVSP is 70 mmHg.    No evidence for hemodynamically significant pericardial effusion      Labs:  Lab Results   Component Value Date/Time    WBC 6.0 09/28/2022 05:21 AM    HGB 9.6 09/28/2022 05:21 AM    HCT 32.1 09/28/2022 05:21 AM    MCV 83.6 09/28/2022 05:21 AM    MCH 25.0 09/28/2022 05:21 AM    MCHC 29.9 09/28/2022 05:21 AM    RDW 16.8 09/28/2022 05:21 AM     09/28/2022 05:21 AM    MPV 10.8 09/28/2022 05:21 AM     Lab Results   Component Value Date/Time     09/28/2022 05:21 AM    K 3.8 09/28/2022 05:21 AM    K 4.1 09/19/2022 04:53 AM    CL 93 09/28/2022 05:21 AM    CO2 40 09/28/2022 05:21 AM    BUN 15 09/28/2022 05:21 AM    CREATININE 0.8 09/28/2022 05:21 AM    LABALBU 3.8 09/28/2022 05:21 AM    LABALBU 3.6 05/02/2012 07:40 PM    CALCIUM 9.8 09/28/2022 05:21 AM    GFRAA >60 09/28/2022 05:21 AM    LABGLOM >60 09/28/2022 05:21 AM     Lab Results   Component Value Date/Time    PROTIME 11.0 03/11/2022 11:29 AM    PROTIME 12.7 05/02/2012 07:40 PM    INR 1.0 03/11/2022 11:29 AM     Recent Labs     09/28/22  0521   PROBNP 6,282*       Recent Labs     09/28/22  0800   PROCAL 0.11*     This SmartLink has not been configured with any valid records. Abg 9/16/22  7.22/72/330/29/0.6/99    Micro:  9/27 COVID (-)     Assessment:    Acute on chronic respiratory failure with hypoxia and hypercapnia  GI bleed, Anemia  Pulmonary sarcoidosis  Restrictive lung disease  Obesity hypoventilation syndrome  LINDSAY, noncompliant with AVAPs  Chronic home O2 dependence 4 liters NC  Severe pulmonary hypertension WHO group 2, 3-chronic  Stage III severe COPD by Gold classification  Acute on chronic congestive heart failure with preserved EF  Moderate MR, Moderate TR  Chronic dyspnea  [place]'s disease  Diabetes mellitus II  Stage III chronic kidney disease  History of nicotine dependence  PAF  Obesity, BMI 35  Debility       Plan:   Wean oxygen as tolerated keep SpO2 between 88-92%. AVAPS HS and PRN-as much as patient tolerates  Obtain respiratory culture  Bronchodilators: Brovana/Pulmicort twice daily, albuterol as needed  CXR unchanged, Lasix 20mg IV x1 given in ER for possible pulmonary edema   Continue DDAVP, previously seen by nephrology for diuresis  Chronic prednisone 5 mg daily  GI/DVT prophylaxis     Plan of care reviewed in collaboration with Dr. Yamel Mendez  Electronically signed by FRIDA Novoa - CNP on 9/28/2022 at 2:06 PM      I personally saw, examined, and cared for the patient. Labs, medications, radiographs reviewed. I agree with history exam and plans detailed in NP note. Patient return from facility. Apparently patient facility did not have BiPAP readily available and she had difficulty. We will continue NIV here. Spouse updated.     Geovanna Mealing, DO

## 2022-09-28 NOTE — ED PROVIDER NOTES
HPI:  9/28/22, Time: 5:03 AM EDT         Tiffany Teixeira is a 73 y.o. female presenting to the ED for sob, beginning hours ago. The complaint has been persistent, moderate in severity, and worsened by nothing. Patient presenting here because of shortness of breath patient reports she was just discharged from the hospital she does have a history of being on oxygen reportedly unable to use BiPAP at facility. Patient also has history of COPD and sarcoidosis.   She does report she is been coughing up productive sputum patient reporting no new abdominal pain she has a history of a hernia she reports no leg pain she reports no headache she reports no active chest pain    ROS:   Pertinent positives and negatives are stated within HPI, all other systems reviewed and are negative.  --------------------------------------------- PAST HISTORY ---------------------------------------------  Past Medical History:  has a past medical history of A-fib ([address] Able to transfer from chair to wheelchair, Abnormal mammogram, Acute on chronic respiratory failure ([address] Anemia, Anemia due to chronic illness, Ankle fracture, left, 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[address] Backache, Benign hypertension, CHF (congestive heart failure) ([address] Chronic back pain, Chronic kidney disease, Chronic pain disorder, Chronic, continuous use of opioids, Compression fracture of thoracic vertebra ([address] COPD (chronic obstructive pulmonary disease) ([address] Debility, Deformity of ankle and foot, acquired, Depression, Diabetes insipidus ([address] Diverticulitis, Dry eyes, Encephalopathy acute, Gait disturbance, GERD (gastroesophageal reflux disease), Hemorrhoids, Hernia, History of blood transfusion, History of Clostridium difficile, Hyperlipidemia, Hypothyroidism, Ischemic colitis, enteritis, or enterocolitis ([address] Long term (current) use of systemic steroids, Long-term current use of steroids, Lumbar disc herniation, Myalgia and myositis, unspecified, Nephrosclerosis, Nonunion of fracture, Osteoarthritis, PAF (paroxysmal atrial fibrillation) ([address] 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[address] Pulmonary hypertension ([address] Pulmonary sarcoidosis ([address] Rectal bleeding, Rhabdomyolysis, Steroid-induced avascular necrosis of shoulder ([address] Tibia fracture, and Ventral hernia without obstruction or gangrene. Past Surgical History:  has a past surgical history that includes fracture surgery (2010); Kyphosis surgery; Lumbar disc surgery; Tibia / fibia lengthening; other surgical history (11/1/11); Abdomen surgery (2008); Echo Complete (4/22/2013); ECHO Compl W Dop Color Flow (8/16/2015); Upper gastrointestinal endoscopy (1/4/2016); Hemorrhoid surgery (12/08/2016); Colonoscopy (2008); Colonoscopy (04/11/2014); Colonoscopy (11/23/2015); Colonoscopy (10/24/2016); Colonoscopy (07/26/2017); Upper gastrointestinal endoscopy (07/26/2017); sigmoidoscopy (N/A, 3/19/2021); and other surgical history (12/14/2021). Social History:  reports that she quit smoking about 26 years ago. Her smoking use included cigarettes. She started smoking about 51 years ago. She has a 18.75 pack-year smoking history. She has never used smokeless tobacco. She reports that she does not currently use drugs. She reports that she does not drink alcohol. Family History: family history includes Alcohol Abuse in her sister; Arthritis in her father and mother; Diabetes in her father and mother; High Blood Pressure in her father, mother, and sister; Substance Abuse in her brother and sister. The patients home medications have been reviewed. Allergies: Patient has no known allergies.     ---------------------------------------------------PHYSICAL EXAM--------------------------------------    Constitutional/General: Alert and oriented x3, mild to moderate distress  Head: Normocephalic and atraumatic  Eyes: PERRL, EOMI  Mouth: Oropharynx clear, handling secretions, no trismus  Neck: Supple, full ROM, non tender to palpation in the midline, no stridor, no crepitus, no meningeal signs  Pulmonary: Lungs diminished bilaterally mild to moderate distress  Cardiovascular:  Regular rate. Irregular no murmurs, gallops, or rubs. 2+ distal pulses  Chest: no chest wall tenderness  Abdomen: Soft. Non tender. Non distended. Hernia noted reducible +BS. No rebound, guarding, or rigidity. No pulsatile masses appreciated. Musculoskeletal: Moves all extremities x 4. Warm and well perfused, no clubbing, cyanosis, or edema. Capillary refill <3 seconds  Skin: warm and dry. No rashes. Neurologic: GCS 15, CN 2-12 grossly intact, no focal deficits, symmetric strength 5/5 in the upper and lower extremities bilaterally  Psych: Normal Affect    -------------------------------------------------- RESULTS -------------------------------------------------  I have personally reviewed all laboratory and imaging results for this patient. Results are listed below.      LABS:  Results for orders placed or performed during the hospital encounter of 09/28/22   CBC with Auto Differential   Result Value Ref Range    WBC 6.0 4.5 - 11.5 E9/L    RBC 3.84 3.50 - 5.50 E12/L    Hemoglobin 9.6 (L) 11.5 - 15.5 g/dL    Hematocrit 32.1 (L) 34.0 - 48.0 %    MCV 83.6 80.0 - 99.9 fL    MCH 25.0 (L) 26.0 - 35.0 pg    MCHC 29.9 (L) 32.0 - 34.5 %    RDW 16.8 (H) 11.5 - 15.0 fL    Platelets 669 830 - 066 E9/L    MPV 10.8 7.0 - 12.0 fL    Neutrophils % 87.8 (H) 43.0 - 80.0 %    Lymphocytes % 6.1 (L) 20.0 - 42.0 %    Monocytes % 2.6 2.0 - 12.0 %    Eosinophils % 3.5 0.0 - 6.0 %    Basophils % 0.2 0.0 - 2.0 %    Neutrophils Absolute 5.28 1.80 - 7.30 E9/L    Lymphocytes Absolute 0.36 (L) 1.50 - 4.00 E9/L    Monocytes Absolute 0.18 0.10 - 0.95 E9/L    Eosinophils Absolute 0.21 0.05 - 0.50 E9/L    Basophils Absolute 0.00 0.00 - 0.20 E9/L    Anisocytosis 1+     Polychromasia 1+     Hypochromia 3+     Poikilocytes 1+     Ovalocytes 1+    Comprehensive Metabolic Panel Result Value Ref Range    Sodium 138 132 - 146 mmol/L    Potassium 3.8 3.5 - 5.0 mmol/L    Chloride 93 (L) 98 - 107 mmol/L    CO2 40 (H) 22 - 29 mmol/L    Anion Gap 5 (L) 7 - 16 mmol/L    Glucose 77 74 - 99 mg/dL    BUN 15 6 - 23 mg/dL    Creatinine 0.8 0.5 - 1.0 mg/dL    GFR Non-African American >60 >=60 mL/min/1.73    GFR African American >60     Calcium 9.8 8.6 - 10.2 mg/dL    Total Protein 6.8 6.4 - 8.3 g/dL    Albumin 3.8 3.5 - 5.2 g/dL    Total Bilirubin 0.6 0.0 - 1.2 mg/dL    Alkaline Phosphatase 114 (H) 35 - 104 U/L    ALT 27 0 - 32 U/L    AST 32 (H) 0 - 31 U/L   Troponin   Result Value Ref Range    Troponin, High Sensitivity 17 (H) 0 - 9 ng/L   Brain Natriuretic Peptide   Result Value Ref Range    Pro-BNP 6,282 (H) 0 - 125 pg/mL   Blood Gas, Arterial   Result Value Ref Range    Date Analyzed [id number]     Time Analyzed 0545     Source: Blood Arterial     pH, Blood Gas 7.460 (H) 7.350 - 7.450    PCO2 53.4 (H) 35.0 - 45.0 mmHg    PO2 124.0 (H) 75.0 - 100.0 mmHg    HCO3 37.1 (H) 22.0 - 26.0 mmol/L    B.E. 11.7 (H) -3.0 - 3.0 mmol/L    O2 Sat 98.7 (H) 92.0 - 98.5 %    PO2/FIO2 1.24 mmHg/%    O2Hb 97.6 (H) 94.0 - 97.0 %    COHb 0.7 0.0 - 1.5 %    MetHb 0.4 0.0 - 1.5 %    O2 Content 14.6 mL/dL    HHb 1.3 0.0 - 5.0 %    tHb (est) 10.5 (L) 11.5 - 16.5 g/dL    Mode AVAPS     FIO2 100.0 %    Rr Mechanical 16.0 b/min    Vt Mechanical 450.0 mL    Peep/Cpap 6.0 cmH2O    Date Of Collection      Time Collected      Pt Temp 37.0 C     ID [id number]     Lab 12513     Critical(s) Notified . No Critical Values        RADIOLOGY:  Interpreted by Radiologist.  XR CHEST PORTABLE   Final Result   Unchanged appearance from the day prior of shallow inspiration and patchy   bilateral airspace opacities suggesting atelectasis or mild multi lobar   pneumonitis. RECOMMENDATION:   Careful clinical correlation and follow up recommended. EKG:  This EKG is signed and interpreted by me.     Rate: 99  Rhythm: Atrial fibrillation  Interpretation: non-specific EKG  Comparison: stable as compared to patient's most recent EKG      ------------------------- NURSING NOTES AND VITALS REVIEWED ---------------------------   The nursing notes within the ED encounter and vital signs as below have been reviewed by myself. BP (!) 144/108   Pulse 98   Temp 98.2 °F (36.8 °C)   Resp 24   Ht 5' (1.524 m)   Wt 184 lb (83.5 kg)   LMP  (LMP Unknown)   SpO2 100%   BMI 35.94 kg/m²   Oxygen Saturation Interpretation: Abnormal    The patients available past medical records and past encounters were reviewed. ------------------------------ ED COURSE/MEDICAL DECISION MAKING----------------------  Medications   furosemide (LASIX) injection 20 mg (has no administration in time range)   ipratropium-albuterol (DUONEB) nebulizer solution 1 ampule (1 ampule Inhalation Given 9/28/22 0522)             Medical Decision Making:   Patient presenting here because of shortness of breath. Patient does have a history of atrial fibrillation history of COPD also history of sarcoidosis. Patient also has history of CHF. Patient arrived here she was on nonrebreather by EMS. They had reported that she was hypoxic with cannula. And patient was noted be hypoxic I had them remove her off the nonrebreather and leave her on the cannula. Patient pulse ox did drop to 81%. Patient was ordered BiPAP here in the emergency department. Patient reporting no chest discomfort but does report some cough productive sputum she reports no abdominal pain that is new. She does have a hernia that is unchanged. Patient lungs are diminished she was ordered breathing treatment she was ordered BiPAP here in the emergency department as well as Lasix I did review her chest x-ray does look unchanged it does look more like heart failure but radiology read as more of a pneumonitis. Patient afebrile here patient medicated and will be admitted.   Patient rechecked and pulse ox stable with BiPAP. Patient reporting no chest discomfort and less tachypneic here in the emergency department. Re-Evaluations:             Patient noted be hypoxic. Patient was placed on BiPAP. Patient given breathing treatments as well as diuretics. Patient breathing much easier. Patient's pulse ox stable. Patient having no active chest pain. Patient made aware of findings and plan. Patient will be admitted to monitored bed. Consultations:           Spoke to family practice Dr Lisa Ewing and they will admit. Call was placed to resident. Critical Care:   Please note that the withdrawal or failure to initiate urgent interventions for this patient would likely result in a life threatening deterioration or permanent disability. Accordingly this patient received 30 minutes of critical care time, excluding separately billable procedures. This patient's ED course included: a personal history and physicial eaxmination    This patient has been closely monitored during their ED course. Counseling: The emergency provider has spoken with the patient and discussed todays results, in addition to providing specific details for the plan of care and counseling regarding the diagnosis and prognosis. Questions are answered at this time and they are agreeable with the plan.       --------------------------------- IMPRESSION AND DISPOSITION ---------------------------------    IMPRESSION  1. Congestive heart failure, unspecified HF chronicity, unspecified heart failure type ([facility] [address]    2. Acute respiratory failure with hypoxia (HCC)        DISPOSITION  Disposition: Admit to intermediate bed  Patient condition is stable        NOTE: This report was transcribed using voice recognition software.  Every effort was made to ensure accuracy; however, inadvertent computerized transcription errors may be present          Mahin Platt MD  09/28/22 0562       Mahin Platt MD  09/28/22 7315

## 2022-09-28 NOTE — CARE COORDINATION
Social Work /Transition of Care:    Pt presents to the ED secondary to shortness of breath from [facility] at [facility]. Pt discharged from this hospital yesterday and per , the facility did not have a bipap machine for pt when she arrived, and also did not get her medication until 2am.  Per liaison from facility, the pt's bipap settings were communicated to staff at facility prior to pt leaving the hospital.  SW observed documentation stating the RN at this hospital spoke with Chani at [facility] and discussed bipap and settings prior to pt leaving the hospital.    SW discussed discharge plans with pt and . Pt was in room on bipap. Pt initially stated she wanted to go home but pt's  told pt she needed to go to rehab and would like pt to be stronger before she returns home. Pt's  states they would like referrals to [facility] and [address] [place] [address]. All referrals made. [facility] has no beds available at this time. Pt will need PT/OT evals and insurance authorization prior to discharge. ILEANA/KAREEN to follow.

## 2022-09-28 NOTE — PROGRESS NOTES
Date: 9/28/2022    Time: 5:29 AM    Patient Placed On BIPAP/CPAP/ Non-Invasive Ventilation? Yes    If no must comment. Facial area red/color change? No           If YES are Blister/Lesion present? No   If yes must notify nursing staff  BIPAP/CPAP skin barrier?   Yes    Skin barrier type:mepilexlite       Comments:        Marisol Gomes RCP

## 2022-09-29 ENCOUNTER — APPOINTMENT (OUTPATIENT)
Dept: GENERAL RADIOLOGY | Age: 67
DRG: 291 | End: 2022-09-29
Payer: MEDICARE

## 2022-09-29 LAB
ACETAMINOPHEN LEVEL: <5 MCG/ML (ref 10–30)
ALBUMIN SERPL-MCNC: 3.1 G/DL (ref 3.5–5.2)
ALP BLD-CCNC: 99 U/L (ref 35–104)
ALT SERPL-CCNC: 19 U/L (ref 0–32)
ANION GAP SERPL CALCULATED.3IONS-SCNC: 11 MMOL/L (ref 7–16)
ANISOCYTOSIS: ABNORMAL
AST SERPL-CCNC: 19 U/L (ref 0–31)
BASOPHILS ABSOLUTE: 0.05 E9/L (ref 0–0.2)
BASOPHILS RELATIVE PERCENT: 0.9 % (ref 0–2)
BILIRUB SERPL-MCNC: 0.8 MG/DL (ref 0–1.2)
BUN BLDV-MCNC: 11 MG/DL (ref 6–23)
CALCIUM SERPL-MCNC: 9.1 MG/DL (ref 8.6–10.2)
CHLORIDE BLD-SCNC: 92 MMOL/L (ref 98–107)
CO2: 37 MMOL/L (ref 22–29)
CREAT SERPL-MCNC: 0.8 MG/DL (ref 0.5–1)
EKG ATRIAL RATE: 79 BPM
EKG Q-T INTERVAL: 350 MS
EKG QRS DURATION: 72 MS
EKG QTC CALCULATION (BAZETT): 449 MS
EKG R AXIS: 76 DEGREES
EKG T AXIS: -41 DEGREES
EKG VENTRICULAR RATE: 99 BPM
EOSINOPHILS ABSOLUTE: 0.05 E9/L (ref 0.05–0.5)
EOSINOPHILS RELATIVE PERCENT: 0.9 % (ref 0–6)
ETHANOL: <10 MG/DL (ref 0–0.08)
GFR AFRICAN AMERICAN: >60
GFR NON-AFRICAN AMERICAN: >60 ML/MIN/1.73
GLUCOSE BLD-MCNC: 76 MG/DL (ref 74–99)
HCT VFR BLD CALC: 30.1 % (ref 34–48)
HEMOGLOBIN: 9 G/DL (ref 11.5–15.5)
LYMPHOCYTES ABSOLUTE: 0.16 E9/L (ref 1.5–4)
LYMPHOCYTES RELATIVE PERCENT: 2.6 % (ref 20–42)
MCH RBC QN AUTO: 24.9 PG (ref 26–35)
MCHC RBC AUTO-ENTMCNC: 29.9 % (ref 32–34.5)
MCV RBC AUTO: 83.4 FL (ref 80–99.9)
MONOCYTES ABSOLUTE: 0.22 E9/L (ref 0.1–0.95)
MONOCYTES RELATIVE PERCENT: 4.3 % (ref 2–12)
NEUTROPHILS ABSOLUTE: 4.91 E9/L (ref 1.8–7.3)
NEUTROPHILS RELATIVE PERCENT: 91.3 % (ref 43–80)
OVALOCYTES: ABNORMAL
PDW BLD-RTO: 16.6 FL (ref 11.5–15)
PLATELET # BLD: 167 E9/L (ref 130–450)
PMV BLD AUTO: 11.7 FL (ref 7–12)
POIKILOCYTES: ABNORMAL
POTASSIUM REFLEX MAGNESIUM: 3.9 MMOL/L (ref 3.5–5)
RBC # BLD: 3.61 E12/L (ref 3.5–5.5)
SALICYLATE, SERUM: <0.3 MG/DL (ref 0–30)
SODIUM BLD-SCNC: 140 MMOL/L (ref 132–146)
TOTAL PROTEIN: 5.9 G/DL (ref 6.4–8.3)
TRICYCLIC ANTIDEPRESSANTS SCREEN SERUM: NEGATIVE NG/ML
VITAMIN D 25-HYDROXY: 24 NG/ML (ref 30–100)
WBC # BLD: 5.4 E9/L (ref 4.5–11.5)

## 2022-09-29 PROCEDURE — 85025 COMPLETE CBC W/AUTO DIFF WBC: CPT

## 2022-09-29 PROCEDURE — 97530 THERAPEUTIC ACTIVITIES: CPT

## 2022-09-29 PROCEDURE — 99232 SBSQ HOSP IP/OBS MODERATE 35: CPT | Performed by: FAMILY MEDICINE

## 2022-09-29 PROCEDURE — 2140000000 HC CCU INTERMEDIATE R&B

## 2022-09-29 PROCEDURE — 80179 DRUG ASSAY SALICYLATE: CPT

## 2022-09-29 PROCEDURE — 6370000000 HC RX 637 (ALT 250 FOR IP)

## 2022-09-29 PROCEDURE — 71045 X-RAY EXAM CHEST 1 VIEW: CPT

## 2022-09-29 PROCEDURE — 82306 VITAMIN D 25 HYDROXY: CPT

## 2022-09-29 PROCEDURE — 94660 CPAP INITIATION&MGMT: CPT

## 2022-09-29 PROCEDURE — 94640 AIRWAY INHALATION TREATMENT: CPT

## 2022-09-29 PROCEDURE — 82077 ASSAY SPEC XCP UR&BREATH IA: CPT

## 2022-09-29 PROCEDURE — 80053 COMPREHEN METABOLIC PANEL: CPT

## 2022-09-29 PROCEDURE — 2700000000 HC OXYGEN THERAPY PER DAY

## 2022-09-29 PROCEDURE — 6360000002 HC RX W HCPCS

## 2022-09-29 PROCEDURE — 97161 PT EVAL LOW COMPLEX 20 MIN: CPT

## 2022-09-29 PROCEDURE — 80143 DRUG ASSAY ACETAMINOPHEN: CPT

## 2022-09-29 PROCEDURE — 97165 OT EVAL LOW COMPLEX 30 MIN: CPT

## 2022-09-29 PROCEDURE — 2580000003 HC RX 258

## 2022-09-29 PROCEDURE — 80307 DRUG TEST PRSMV CHEM ANLYZR: CPT

## 2022-09-29 PROCEDURE — 36415 COLL VENOUS BLD VENIPUNCTURE: CPT

## 2022-09-29 RX ORDER — VITAMIN B COMPLEX
1000 TABLET ORAL DAILY
Status: DISCONTINUED | OUTPATIENT
Start: 2022-09-29 | End: 2022-10-04 | Stop reason: HOSPADM

## 2022-09-29 RX ORDER — FUROSEMIDE 10 MG/ML
40 INJECTION INTRAMUSCULAR; INTRAVENOUS ONCE
Status: COMPLETED | OUTPATIENT
Start: 2022-09-29 | End: 2022-09-29

## 2022-09-29 RX ORDER — FUROSEMIDE 10 MG/ML
40 INJECTION INTRAMUSCULAR; INTRAVENOUS DAILY
Status: DISCONTINUED | OUTPATIENT
Start: 2022-09-30 | End: 2022-10-03

## 2022-09-29 RX ADMIN — FERROUS SULFATE TAB 325 MG (65 MG ELEMENTAL FE) 325 MG: 325 (65 FE) TAB at 21:12

## 2022-09-29 RX ADMIN — DILTIAZEM HYDROCHLORIDE 30 MG: 30 TABLET, FILM COATED ORAL at 13:33

## 2022-09-29 RX ADMIN — OXYCODONE AND ACETAMINOPHEN 1 TABLET: 5; 325 TABLET ORAL at 09:06

## 2022-09-29 RX ADMIN — BUDESONIDE 500 MCG: 0.5 SUSPENSION RESPIRATORY (INHALATION) at 22:06

## 2022-09-29 RX ADMIN — OXYCODONE AND ACETAMINOPHEN 1 TABLET: 5; 325 TABLET ORAL at 22:11

## 2022-09-29 RX ADMIN — PREDNISONE 5 MG: 5 TABLET ORAL at 09:06

## 2022-09-29 RX ADMIN — LEVOTHYROXINE SODIUM 75 MCG: 0.07 TABLET ORAL at 09:04

## 2022-09-29 RX ADMIN — DILTIAZEM HYDROCHLORIDE 30 MG: 30 TABLET, FILM COATED ORAL at 09:05

## 2022-09-29 RX ADMIN — DESMOPRESSIN ACETATE 200 MCG: 0.1 TABLET ORAL at 09:05

## 2022-09-29 RX ADMIN — Medication 1000 UNITS: at 09:06

## 2022-09-29 RX ADMIN — SODIUM CHLORIDE, PRESERVATIVE FREE 10 ML: 5 INJECTION INTRAVENOUS at 09:08

## 2022-09-29 RX ADMIN — FAMOTIDINE 20 MG: 20 TABLET, FILM COATED ORAL at 09:06

## 2022-09-29 RX ADMIN — OXYCODONE AND ACETAMINOPHEN 1 TABLET: 5; 325 TABLET ORAL at 15:41

## 2022-09-29 RX ADMIN — FERROUS SULFATE TAB 325 MG (65 MG ELEMENTAL FE) 325 MG: 325 (65 FE) TAB at 09:06

## 2022-09-29 RX ADMIN — SODIUM CHLORIDE, PRESERVATIVE FREE 10 ML: 5 INJECTION INTRAVENOUS at 21:12

## 2022-09-29 RX ADMIN — METOPROLOL SUCCINATE 100 MG: 100 TABLET, FILM COATED, EXTENDED RELEASE ORAL at 09:05

## 2022-09-29 RX ADMIN — BUDESONIDE 500 MCG: 0.5 SUSPENSION RESPIRATORY (INHALATION) at 08:22

## 2022-09-29 RX ADMIN — OMEGA-3-ACID ETHYL ESTERS 1 G: 1 CAPSULE, LIQUID FILLED ORAL at 21:12

## 2022-09-29 RX ADMIN — METOPROLOL SUCCINATE 50 MG: 25 TABLET, EXTENDED RELEASE ORAL at 09:14

## 2022-09-29 RX ADMIN — ESCITALOPRAM OXALATE 10 MG: 10 TABLET ORAL at 09:06

## 2022-09-29 RX ADMIN — DILTIAZEM HYDROCHLORIDE 30 MG: 30 TABLET, FILM COATED ORAL at 21:12

## 2022-09-29 RX ADMIN — DESMOPRESSIN ACETATE 200 MCG: 0.1 TABLET ORAL at 21:12

## 2022-09-29 RX ADMIN — ARFORMOTEROL TARTRATE 15 MCG: 15 SOLUTION RESPIRATORY (INHALATION) at 08:22

## 2022-09-29 RX ADMIN — ARFORMOTEROL TARTRATE 15 MCG: 15 SOLUTION RESPIRATORY (INHALATION) at 22:06

## 2022-09-29 RX ADMIN — FUROSEMIDE 40 MG: 10 INJECTION, SOLUTION INTRAMUSCULAR; INTRAVENOUS at 13:36

## 2022-09-29 RX ADMIN — OMEGA-3-ACID ETHYL ESTERS 1 G: 1 CAPSULE, LIQUID FILLED ORAL at 09:05

## 2022-09-29 ASSESSMENT — PAIN DESCRIPTION - DESCRIPTORS
DESCRIPTORS: ACHING
DESCRIPTORS: DISCOMFORT;ACHING
DESCRIPTORS: ACHING;DISCOMFORT

## 2022-09-29 ASSESSMENT — PAIN SCALES - GENERAL
PAINLEVEL_OUTOF10: 10
PAINLEVEL_OUTOF10: 9
PAINLEVEL_OUTOF10: 0
PAINLEVEL_OUTOF10: 10
PAINLEVEL_OUTOF10: 0

## 2022-09-29 ASSESSMENT — PAIN DESCRIPTION - LOCATION
LOCATION: BACK
LOCATION: BACK
LOCATION: ABDOMEN;BACK

## 2022-09-29 ASSESSMENT — PAIN - FUNCTIONAL ASSESSMENT: PAIN_FUNCTIONAL_ASSESSMENT: PREVENTS OR INTERFERES SOME ACTIVE ACTIVITIES AND ADLS

## 2022-09-29 ASSESSMENT — PAIN DESCRIPTION - ORIENTATION: ORIENTATION: LOWER

## 2022-09-29 NOTE — PROGRESS NOTES
Lei Cook M.D.,[facility]  Iva Kinney D.O., F.A.C.O.I., Raffaele Duarte M.D. Finn Delarosa M.D. Mely Ramirez D.O. Daily Pulmonary Progress Note    Patient:  Carlo Carcamo 73 y.o. female MRN: [id number]     Date of Service: 9/29/2022      Synopsis     We are following patient for respiratory failure, sarcoidosis     \"CC\"  rectal bleeding    Code status: FULL       Subjective     Patient was seen and examined. Laying in bed 93% on 6L NC in NAD. Patient states her breathing is much better. Denies respiratory complaints at this time.     Review of Systems:  C/o shortness of breath  Denies headache  Denies N/V/D  Denies dizziness    24-hour events:  none    Objective   Vitals: BP (!) 151/96   Pulse 96   Temp (!) 96.7 °F (35.9 °C) (Temporal)   Resp 21   Ht 5' (1.524 m)   Wt 177 lb (80.3 kg)   LMP  (LMP Unknown)   SpO2 100%   BMI 34.57 kg/m²     I/O:    Intake/Output Summary (Last 24 hours) at 9/29/2022 1327  Last data filed at 9/29/2022 0954  Gross per 24 hour   Intake 100 ml   Output 2200 ml   Net -2100 ml          IPAP: 6 cmH20  CPAP/EPAP: 6 cmH2O     CURRENT MEDS :  Scheduled Meds:   Vitamin D  1,000 Units Oral Daily    furosemide  40 mg IntraVENous Once    desmopressin  200 mcg Oral BID    dilTIAZem  30 mg Oral TID    escitalopram  10 mg Oral Daily    famotidine  20 mg Oral Daily    ferrous sulfate  325 mg Oral BID    [Held by provider] furosemide  40 mg Oral Once per day on Mon Wed Fri    levothyroxine  75 mcg Oral Daily    metoprolol succinate  100 mg Oral Daily    metoprolol succinate  50 mg Oral Daily    omega-3 acid ethyl esters  1 g Oral BID    predniSONE  5 mg Oral Daily    lidocaine  1 patch TransDERmal Daily    diclofenac sodium  4 g Topical BID    sodium chloride flush  5-40 mL IntraVENous 2 times per day    Arformoterol Tartrate  15 mcg Nebulization BID    budesonide  500 mcg Nebulization BID       Physical Exam:  General Appearance: appears comfortable in no acute distress. HEENT: Normocephalic atraumatic without obvious abnormality   Neck: Lips, mucosa, and tongue normal. Supple, symmetrical, trachea midline, no adenopathy;thyroid: no enlargement/tenderness/nodules or JVD. Lung: Breath sounds diminished bibasilar. Respirations unlabored. Symmetrical expansion. Heart: irregular   Abdomen: Soft, NT, ND. BS present x 4 quadrants. No bruit or organomegaly. Extremities: Pedal pulses 2+ symmetric b/l. Extremities normal, no cyanosis, clubbing, or edema. Musculokeletal: No joint swelling, no muscle tenderness. ROM normal in all joints of extremities. Neurologic: Mental status: Alert and oriented     Pertinent/ New Labs and Imaging Studies     Imaging Personally Reviewed:  CXR 9/29  Markings seen diffusely throughout the right lung slightly worsened in the   interval with slight worsening of the markings at the left lung base. Echo 7/16/22   Ejection fraction is visually estimated at 60%. No regional wall motion abnormalities seen. Moderate left ventricular concentric hypertrophy noted. Dilated right ventricle with reduced function. Left atrial volume index of 34 ml per meters squared BSA. Moderate mitral regurgitation is present. Moderate tricuspid regurgitation. Pulmonary hypertension is severe. RVSP is 70 mmHg.    No evidence for hemodynamically significant pericardial effusion      Labs:  Lab Results   Component Value Date/Time    WBC 5.4 09/29/2022 04:32 AM    HGB 9.0 09/29/2022 04:32 AM    HCT 30.1 09/29/2022 04:32 AM    MCV 83.4 09/29/2022 04:32 AM    MCH 24.9 09/29/2022 04:32 AM    MCHC 29.9 09/29/2022 04:32 AM    RDW 16.6 09/29/2022 04:32 AM     09/29/2022 04:32 AM    MPV 11.7 09/29/2022 04:32 AM     Lab Results   Component Value Date/Time     09/29/2022 04:32 AM    K 3.9 09/29/2022 04:32 AM    CL 92 09/29/2022 04:32 AM    CO2 37 09/29/2022 04:32 AM    BUN 11 09/29/2022 04:32 AM    CREATININE 0.8 09/29/2022 04:32 AM    LABALBU 3.1 09/29/2022 04:32 AM    LABALBU 3.6 05/02/2012 07:40 PM    CALCIUM 9.1 09/29/2022 04:32 AM    GFRAA >60 09/29/2022 04:32 AM    LABGLOM >60 09/29/2022 04:32 AM     Lab Results   Component Value Date/Time    PROTIME 11.0 03/11/2022 11:29 AM    PROTIME 12.7 05/02/2012 07:40 PM    INR 1.0 03/11/2022 11:29 AM     Recent Labs     09/28/22  0521   PROBNP 6,282*       Recent Labs     09/28/22  0800   PROCAL 0.11*       This SmartLink has not been configured with any valid records. Abg 9/16/22  7.22/72/330/29/0.6/99    Micro:  9/27 COVID (-)     Assessment:    Acute on chronic respiratory failure with hypoxia and hypercapnia  GI bleed, Anemia  Pulmonary sarcoidosis  Restrictive lung disease  Obesity hypoventilation syndrome  LINDSAY, noncompliant with AVAPs  Chronic home O2 dependence 4 liters NC  Severe pulmonary hypertension WHO group 2, 3-chronic  Stage III severe COPD by Gold classification  Acute on chronic congestive heart failure with preserved EF  Moderate MR, Moderate TR  Chronic dyspnea  Rogelio's disease  Diabetes mellitus II  Stage III chronic kidney disease  History of nicotine dependence  PAF  Obesity, BMI 35  Debility       Plan:   Wean oxygen as tolerated keep SpO2 between 88-92%. AVAPS HS and PRN-as much as patient tolerates  Obtain respiratory culture if able  Bronchodilators: Brovana/Pulmicort twice daily, albuterol as needed  CXR with worsening infiltrates/pulmonary edema bilaterally   Lasix 40mg IV daily, CXR in am  Continue DDAVP  Chronic prednisone 5 mg daily  GI/DVT prophylaxis     Plan of care reviewed in collaboration with Dr. Vane Tom  Electronically signed by FRIDA Guy CNP on 9/29/2022 at 1:27 PM    I personally saw, examined, and cared for the patient. Labs, medications, radiographs reviewed. I agree with history exam and plans detailed in NP note. Start diuresis.     Fern Bunting, DO

## 2022-09-29 NOTE — ACP (ADVANCE CARE PLANNING)
.Advance Care Planning   Healthcare Decision Maker:    Primary Decision Maker: 1978 [address] [phone]    Click here to complete [address] including selection of the Healthcare Decision Maker Relationship (ie \"Primary\").

## 2022-09-29 NOTE — PROGRESS NOTES
[facility] Inpatient   Resident Progress Note    S:  Hospital day: 1   Brief Synopsis: Isha Brooks is a 73 y.o. female with a PMH of COPD, sarcoidosis, CHF, pulmonary hypertension, A. fib, chronic pain disorder, chronic opioid use, who presents to ED for Shortness of Breath. She is on 4L O2 chronically and intermittent BIPAP. Patient was just discharged from hospital yesterday and was sent to [facility]. She was in the hospital for rectal bleeding. She states that while she was at [facility] the staff did not know how to adjust her BiPAP settings. Due to this she became increasingly more short of breath as the day went on. The shortness of breath was accompanied by chest tightness and increased sputum production. She states that she asked for a breathing treatment but was given her inhaler to use x1. She did receive her Lasix dose yesterday. She states that since starting Lasix her lower extremity edema has been improved. Last exercise stress test from October 2016 demonstrated normal LV wall motion, myocardial thickening during systole and EF 76%. Last echo from 7/15/2022 showing EF of 60%, moderate left ventricular concentric hypertrophy, dilated right ventricle with reduced function and severe pulmonary hypertension. CT from 9/10/2022 demonstrating opacities in lung bases suggestive of subsegmental atelectasis and chronic interstitial lung disease. ED workup showed: /134, ProBNP 8617, ABG Metabolic alkalosis w/compensated respiratory acidosis and CXR with patchy bilateral airspace opacities suggesting atelectasis or mild multi-lobar interstitial pneumonitis. She was given Lasix 20mg IV and placed on bipap. Was admitted for acute hypoxic respiratory failure. Overnight/interim:   No events overnight. Patient seen and evaluated at bedside this morning. Patient with worsening CXR today. She denies chest pain, shortness of breath, nausea or vomiting.   Overall, she states that she is breathing more comfortably on the BiPAP. She is having good urine output. Cont meds:    sodium chloride       Scheduled meds:    desmopressin  200 mcg Oral BID    dilTIAZem  30 mg Oral TID    escitalopram  10 mg Oral Daily    famotidine  20 mg Oral Daily    ferrous sulfate  325 mg Oral BID    [Held by provider] furosemide  40 mg Oral Once per day on     levothyroxine  75 mcg Oral Daily    metoprolol succinate  100 mg Oral Daily    metoprolol succinate  50 mg Oral Daily    omega-3 acid ethyl esters  1 g Oral BID    predniSONE  5 mg Oral Daily    lidocaine  1 patch TransDERmal Daily    diclofenac sodium  4 g Topical BID    sodium chloride flush  5-40 mL IntraVENous 2 times per day    Arformoterol Tartrate  15 mcg Nebulization BID    budesonide  500 mcg Nebulization BID     PRN meds: oxyCODONE-acetaminophen, sodium chloride flush, sodium chloride, polyethylene glycol, acetaminophen **OR** acetaminophen, promethazine **OR** ondansetron, albuterol     I reviewed the patient's past medical and surgical history, Medications and Allergies. O:  /89   Pulse 79   Temp (!) 96.3 °F (35.7 °C) (Temporal)   Resp 16   Ht 5' (1.524 m)   Wt 177 lb (80.3 kg)   LMP  (LMP Unknown)   SpO2 96%   BMI 34.57 kg/m²   24 hour I&O: I/O last 3 completed shifts:  In: -   Out: 1800 [Urine:1800]  I/O this shift:  In: -   Out: 200 [Urine:200]        Physical Exam  Vitals reviewed. Constitutional:       General: She is not in acute distress. Appearance: She is obese. Interventions: Face mask in place. Cardiovascular:      Rate and Rhythm: Rhythm irregular. Pulmonary:      Breath sounds: No decreased air movement. Abdominal:      Palpations: Abdomen is soft. Tenderness: There is no abdominal tenderness. Comments: Gauze overlying repaired ventral hernia    Genitourinary:     Comments: Sanchez in place   Musculoskeletal:      Right lower le+ Edema present.       Left lower le+ Edema present. Right foot: No swelling. Left foot: Deformity present. No swelling. Psychiatric:         Behavior: Behavior is cooperative. Labs:  Na/K/Cl/CO2:  140/3.9/92/37 (09/29 4028)  BUN/Cr/glu/ALT/AST/amyl/lip:  11/0.8/--/19/19/--/-- (09/29 6032)  WBC/Hgb/Hct/Plts:  5.4/9.0/30.1/167 (09/29 0312)  estimated creatinine clearance is 66 mL/min (based on SCr of 0.8 mg/dL). Other pertinent labs as noted below    Radiology:  XR CHEST PORTABLE   Final Result   Unchanged appearance from the day prior of shallow inspiration and patchy   bilateral airspace opacities suggesting atelectasis or mild multi lobar   pneumonitis. RECOMMENDATION:   Careful clinical correlation and follow up recommended.          XR CHEST PORTABLE    (Results Pending)         Resident Assessment and Plan   Acute Hypoxic Respiratory Failure  -Patient with Sarcoid, COPD, CHF  -DUONEB, Pulmicort, Brovana  -Continue on Bipap  -HOLD home dose of Lasix 40mg 3 times weekly (Patient received dose of 20mg in ED today and received 40mg yesterday) - Continue to evaluate for need  -Patient will need placement to a facility that can manage her bipap settings   -ABG: Metabolic Alkalosis with compensated respiratory acidosis  -CXR on day of admission: Patchy bilateral airspace opacities suggesting atelectasis or mild multi-lobar pneumonitis   -CXR 9/29/22: Markings seen diffusely throughout the right lung slightly worsened in the interval with slight worsening of the markings at the left lung base.  -Pulmonary following  -Awaiting cultures   -Add flutter valve   -Vitamin D level insufficient: Add supplement   -Procalcitonin: 0.11 (0.09 on last admission)  -UDS+ for oxycodone  -Will need sleep study outpatient   -Telemetry  -Strict I/Os: -2L since this admission   -Add compression stockings   -Given 40mg IV lasix due to worsening CXR and concern for effusion     Sarcoidosis  -Continue on Prednisone 5mg daily     Elevated troponin  -Downtrending  -Will not repeat at this time  -Telemetry    Atrial Fibrillation  -Continue Toprol XL (100+50mg)  -Continue on Cardizem 30mg TID   -Patient has been on Eliquis in the past but no longer on it. Discontinue for now due to recent GI bleed     Chronic Pain  -Patient on opioids chronically. -Need to discuss pain management plan. Per PDMP patient's opioid use has decreased   -Add lidocaine patch and Diclofenac gel   -UDS+ for oxycodone     Diabetes Insipidus  -Likely secondary to Sarcoidosis  -Continue home dose DDAVP     Chronic Anemia  -Hgb downtrendin.0 today   -Likely due to chronic disease  -Continue on ferrous sulfate 325 BID      Hypothyroidism  -Continue home Levothyroxine 75     Depression  -Continue home Lexapro 10mg      GERD  -Continue home Pepcid     PT/OT: Consult  GI: Pepcid  DVT prophylaxis: PCDs  Dispo:  Admit while awaiting placement   Diet: Low sodium        Electronically signed by Roberth Banerjee DO on 2022 at 6:44 AM  Attending physician: Dr. Mirta Burkitt

## 2022-09-29 NOTE — PLAN OF CARE
Problem: Chronic Conditions and Co-morbidities  Goal: Patient's chronic conditions and co-morbidity symptoms are monitored and maintained or improved  Outcome: Progressing  Flowsheets (Taken 9/28/2022 2040)  Care Plan - Patient's Chronic Conditions and Co-Morbidity Symptoms are Monitored and Maintained or Improved: Monitor and assess patient's chronic conditions and comorbid symptoms for stability, deterioration, or improvement     Problem: Discharge Planning  Goal: Discharge to home or other facility with appropriate resources  Outcome: Progressing  Flowsheets (Taken 9/28/2022 2040)  Discharge to home or other facility with appropriate resources: Identify barriers to discharge with patient and caregiver     Problem: Pain  Goal: Verbalizes/displays adequate comfort level or baseline comfort level  Outcome: Progressing     Problem: Skin/Tissue Integrity  Goal: Absence of new skin breakdown  Description: 1. Monitor for areas of redness and/or skin breakdown  2. Assess vascular access sites hourly  3. Every 4-6 hours minimum:  Change oxygen saturation probe site  4. Every 4-6 hours:  If on nasal continuous positive airway pressure, respiratory therapy assess nares and determine need for appliance change or resting period.   Outcome: Progressing     Problem: ABCDS Injury Assessment  Goal: Absence of physical injury  Outcome: Progressing     Problem: Safety - Adult  Goal: Free from fall injury  Outcome: Progressing

## 2022-09-29 NOTE — PROGRESS NOTES
Physical Therapy  Physical Therapy Initial Evaluation    Name: Yanna Staton  : 1955  MRN: [id number]      Date of Service: 2022    Evaluating PT:  Lauren Escobedo PT [id number]    Referring provider/PT Order:  PT Eval and Treat   22  PT eval and treat      Wagner Weber MD     Room #:  3179/1810-P  Diagnosis:  Acute respiratory failure with hypoxia ([address] [J96.01]  Congestive heart failure, unspecified HF chronicity, unspecified heart failure type ([address] [I50.9]  PMHx/PSHx:     has a past medical history of A-fib ([address] Able to transfer from chair to wheelchair, Abnormal mammogram, Acute on chronic respiratory failure ([address] Anemia, Anemia due to chronic illness, Ankle fracture, left, 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[address] Backache, Benign hypertension, CHF (congestive heart failure) ([address] Chronic back pain, Chronic kidney disease, Chronic pain disorder, Chronic, continuous use of opioids, Compression fracture of thoracic vertebra ([address] COPD (chronic obstructive pulmonary disease) ([address] Debility, Deformity of ankle and foot, acquired, Depression, Diabetes insipidus ([address] Diverticulitis, Dry eyes, Encephalopathy acute, Gait disturbance, GERD (gastroesophageal reflux disease), Hemorrhoids, Hernia, History of blood transfusion, History of Clostridium difficile, Hyperlipidemia, Hypothyroidism, Ischemic colitis, enteritis, or enterocolitis ([address] Long term (current) use of systemic steroids, Long-term current use of steroids, Lumbar disc herniation, Myalgia and myositis, unspecified, Nephrosclerosis, Nonunion of fracture, Osteoarthritis, PAF (paroxysmal atrial fibrillation) ([address] 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[address] Pulmonary hypertension ([address] Pulmonary sarcoidosis ([address] Rectal bleeding, Rhabdomyolysis, Steroid-induced avascular necrosis of shoulder ([address] Tibia fracture, and Ventral hernia without obstruction or gangrene.     has a past surgical history that includes fracture surgery (2010); Kyphosis surgery; Lumbar disc surgery; Tibia / fibia lengthening; other surgical history (11/1/11); Abdomen surgery (2008); Echo Complete (4/22/2013); ECHO Compl W Dop Color Flow (8/16/2015); Upper gastrointestinal endoscopy (1/4/2016); Hemorrhoid surgery (12/08/2016); Colonoscopy (2008); Colonoscopy (04/11/2014); Colonoscopy (11/23/2015); Colonoscopy (10/24/2016); Colonoscopy (07/26/2017); Upper gastrointestinal endoscopy (07/26/2017); sigmoidoscopy (N/A, 3/19/2021); and other surgical history (12/14/2021). Procedure/Surgery:  none  Precautions:  Falls, O2 , FWB (full weight bearing) ,   Equipment Needs: To be determined,    SUBJECTIVE:    Pt lives with spouse in 1 floor home. Ramped entry. Equipment owned: power w/c, BSC, hospital bed, w/w, cane, home O2. Patient ambulated with wheeled walker short distances only. OBJECTIVE:   Initial Evaluation  Date: 9/29/22 Treatment Short Term/ Long Term   Goals   AM-PAC 6 Clicks 73/46     Was pt agreeable to Eval/treatment? yes     Does pt have pain? None stated     Bed Mobility  Rolling: Min  Supine to sit:   Min   Sit to supine: Min   Scooting: Min   Rolling: Ind  Supine to sit: Ind  Sit to supine: Ind  Scooting: Ind   Transfers Sit to stand: Mod  to fww  Stand to sit: Mod   Stand pivot: Mod  with fww  Sit to stand: Mod Ind  to fww  Stand to sit: Mod Ind    Stand pivot: Mod Ind  with fww   Ambulation    Side steps only.    25 feet with Mod Ind     Stair negotiation: ascended and descended  NT  TBD     Strength/ROM:      RLE grossly 3/5  LLE grossly 3/5  RLE AROM WFL  LLE AROM WFL     Balance:   Static Sitting: Ind  Dynamic Sitting: Ind  Static Standing: Max    Dynamic Standing: Max        Patient is Alert & Oriented x person, place, time, and situation and follows directions   Sensation:  Pt denies numbness and tingling to extremities  Edema:  none    Vitals:  O2 sat fluctuated 84% to 96% required cues to PLB   Therapeutic Exercises:  Functional activity with fww for suport. Patient education  Pt educated on role of Physical Therapy, risks of immobility, safety and plan of care and  importance of mobility while in hospital  and use of call light for safety. Patient response to education:   Pt verbalized understanding Pt demonstrated skill Pt requires further education in this area   yes yes Reminders     ASSESSMENT:    Conditions Requiring Skilled Therapeutic Intervention:    [x]Decreased strength     [x]Decreased ROM  [x]Decreased functional mobility  [x]Decreased balance   [x]Decreased endurance   [x]Decreased posture  []Decreased sensation  []Decreased coordination   []Decreased vision  [x]Decreased safety awareness   []Increased pain       Comments:    RN cleared patient for participation in therapy session. Patient was seen this date for PT evaluation. Patient was agreeable to intervention. Results of the functional assessment are noted above. Upon entering the room patient was found supine in bed. Assisted to EOB. Sat EOB x 10 minutes to increase dynamic sitting balance and activity tolerance. STS completed with side step to [facility]. At end of session, patient in bed with  call light and phone within reach,  all lines and tubes intact, nursing notified. This patient can benefit from the continuation of skilled PT possibly in a rehab setting to maximize functional level and return to PLOF. Treatment:  Patient completed and was instructed in the following treatment:      Bed mobility training - pt given verbal and tactile cues to facilitate proper sequencing and safety during rolling and supine>sit as well as provided with physical assistance to complete task    STS and transfer training - educated on hand/foot placement, safety, and sequencing during STS and pivot transfers using assistive device  Gait training -    Education in use of call light for safety  Skilled repositioning in bed.   Pt's/ family goals      1. yes    Prognosis is good  for reaching above PT goals. Patient and or family understand(s) diagnosis, prognosis, and plan of care.  yes,     PHYSICAL THERAPY PLAN OF CARE:    PT POC is established based on physician order and patient diagnosis     Diagnosis:  Acute respiratory failure with hypoxia (HCC) [J96.01]  Congestive heart failure, unspecified HF chronicity, unspecified heart failure type ([facility] [I50.9]  Specific instructions for next treatment:  Sit EOB, postural exercises, Transfer to bedside chair, and Increase ambulation distance  Current Treatment Recommendations:     [x] Strengthening to improve independence with functional mobility   [x] ROM to improve independence with functional mobility   [x] Balance Training to improve static/dynamic balance and to reduce fall risk  [x] Endurance Training to improve activity tolerance during functional mobility   [x] Transfer Training to improve safety and independence with all functional transfers   [x] Gait Training to improve gait mechanics, endurance and asses need for appropriate assistive device  [x] Stair Training in preparation for safe discharge home and/or into the community when appropriate  [] Positioning to prevent skin breakdown and contractures  [x] Safety and Education Training   [] Patient/Caregiver Education   [] HEP  [] Other     PT long term treatment goals are located in above grid    Frequency of treatments: 2-5x/week x 1-2 weeks. Time in  1340  Time out  1405    Total Treatment Time  10 minutes     Evaluation Time includes thorough review of current medical information, gathering information on past medical history/social history and prior level of function, completion of standardized testing/informal observation of tasks, assessment of data and education on plan of care and goals.     CPT codes:  [x] Low Complexity PT evaluation 47222  [] Moderate Complexity PT evaluation 92268  [] High Complexity PT evaluation 22329  [] PT Re-evaluation [id number]  [] Gait training 42964 - minutes  [] Manual therapy 65743  minutes  [x] Therapeutic activities 64056 -10 minutes  [] Therapeutic exercises 98647 - minutes  [] Neuromuscular reeducation 33034 minutes     [place]licia Sawyer, [id number] [facility] [place]

## 2022-09-29 NOTE — CARE COORDINATION
Spoke with [facility], they are not in network. Met with pt who stated that this LSW was to speak to spouse, he will make the decision of new choices. Called spouse Nikos Citizen, who would like 1. [employer] 2. [employer]. Referral to [facility] via message. Envelope and ambulance form in soft chart. Lin Trevizo, MSW, LSW

## 2022-09-29 NOTE — PROGRESS NOTES
Occupational Therapy  OCCUPATIONAL THERAPY INITIAL EVALUATION    [address] [address] [address]      Date:2022                                                Patient Name: Sandy Aguiar  MRN: [id number]  : 1955  Room: [address] [place] [address]    Evaluating OT: Sergio Solis OTR/L #[id number]     Referring Provider: Kathryn Alejo MD  Specific Provider Orders/Date: OT eval and treat 22    Diagnosis: Acute respiratory failure with hypoxia ([address] [J96.01]  Congestive heart failure, unspecified HF chronicity, unspecified heart failure type ([address] [I50.9]   Pt admitted to hospital with [facility] rehab facility.   Recent hospitalization     Pertinent Medical History:  has a past medical history of A-fib ([address] Able to transfer from chair to wheelchair, Abnormal mammogram, Acute on chronic respiratory failure ([address] Anemia, Anemia due to chronic illness, Ankle fracture, left, 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[address] Backache, Benign hypertension, CHF (congestive heart failure) (HCC), Chronic back pain, Chronic kidney disease, Chronic pain disorder, Chronic, continuous use of opioids, Compression fracture of thoracic vertebra (HCC), COPD (chronic obstructive pulmonary disease) ([address] Debility, Deformity of ankle and foot, acquired, Depression, Diabetes insipidus ([address] Diverticulitis, Dry eyes, Encephalopathy acute, Gait disturbance, GERD (gastroesophageal reflux disease), Hemorrhoids, Hernia, History of blood transfusion, History of Clostridium difficile, Hyperlipidemia, Hypothyroidism, Ischemic colitis, enteritis, or enterocolitis ([address] Long term (current) use of systemic steroids, Long-term current use of steroids, Lumbar disc herniation, Myalgia and myositis, unspecified, Nephrosclerosis, Nonunion of fracture, Osteoarthritis, PAF (paroxysmal atrial fibrillation) ([address] 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[address] Pulmonary hypertension ([address] Pulmonary sarcoidosis ([facility] Rectal bleeding, Rhabdomyolysis, Steroid-induced avascular necrosis of shoulder ([facility] [address] [facility] Tibia fracture, and Ventral hernia without obstruction or gangrene.        Precautions:  Fall Risk, ramirez, cognition, continuous pulse ox, O2, TAPS, bed alarm  Chronic L Charcot deformity, h/o R tibia fx resulting in varus deformity     Assessment of current deficits    [x] Functional mobility         [x]ADLs           [x] Strength                  [x]Cognition    [x] Functional transfers       [x] IADLs         [x] Safety Awareness   [x]Endurance    [] Fine Coordination                      [x] Balance      [] Vision/perception   []Sensation      []Gross Motor Coordination          [] ROM           [] Delirium                   [] Motor Control      OT PLAN OF CARE   OT POC based on physician orders, patient diagnosis and results of clinical assessment     Frequency/Duration 1-3 days/wk for 2 weeks PRN   Specific OT Treatment Interventions to include:   * Instruction/training on adapted ADL techniques and AE recommendations to increase functional independence within precautions       * Training on energy conservation strategies, correct breathing pattern and techniques to improve independence/tolerance for self-care routine  * Functional transfer/mobility training/DME recommendations for increased independence, safety, and fall prevention  * Patient/Family education to increase follow through with safety techniques and functional independence  * Recommendation of environmental modifications for increased safety with functional transfers/mobility and ADLs  * Cognitive retraining/development of therapeutic activities to improve problem solving, judgement, memory, and attention for increased safety/participation in ADL/IADL tasks  * Therapeutic exercise to improve motor endurance, ROM, and functional strength for ADLs/functional transfers  * Therapeutic activities to facilitate/challenge dynamic balance, stand tolerance for increased safety and independence with ADLs  * Therapeutic activities to facilitate gross/fine motor skills for increased independence with ADLs  * Positioning to improve skin integrity, interaction with environment and functional independence        Recommended Adaptive Equipment: TBD      Home Living: Pt lives with spouse in 1 floor home. Ramped entry  (admitted from rehab)   Bathroom setup: sponge bathes, utilizes BSC for toileting needs    Equipment owned: power w/c, BSC, hospital bed, w/w, cane, home O2     Prior Level of Function: assist with ADLs , assist with IADLs; ambulated short distances only w/ w/w, usually utilizes w/c   Driving: no     Pain Level: Pt denies pain this session     Cognition: A&O: x4; Follows 2 step directions           Memory:  good           Sequencing:  good            Problem solving:  fair            Judgement/safety:  fair             Functional Assessment:  AM-PAC Daily Activity Raw Score: 15/24    Initial Eval Status  Date: 9/29/22 Treatment Status  Date: STGs = LTGs  Time frame: 10-14 days   Feeding Independent       Grooming Stand by Assist    Modified [place]    UB Dressing Minimal Assist    Supervision    LB Dressing Dependent    Moderate Assist    Bathing Maximal Assist     Minimal Assist    Toileting Maximal Assist    Moderate Assist    Bed Mobility    Supine to sit: Minimal Assist   Sit to supine: Stand by Assist    Supervision    Functional Transfers Mod A    Sit to stand transfers   Minimal Assist    Functional Mobility Mod A    1 side step to [facility] with w/w    Minimal Assist    Balance Sitting:     Static:  SBA    Dynamic: SBA  Standing:  Mod  A w/ w/w        Activity Tolerance Fair-    O2 sat 84-96%; cuing on pursed lip breathing techniques    Fair+   Visual/  Perceptual Glasses: no                          Hand Dominance R    AROM (PROM) Strength Additional Info:    RUE  WFL 4-/5 good  and wfl FMC/dexterity noted during ADL tasks         CATALINA MCLEOD/[facility] 4-/5 good  and wfl FMC/dexterity noted during ADL tasks         Hearing: [facility]   Sensation:  No c/o numbness or tingling  Tone: WFL   Edema: none noted    Comments: Upon arrival patient supine in bed and agreeable to OT session. Therapist educated pt on role of OT. At end of session, patient seated in chair with call light and phone within reach, all lines and tubes intact. Overall patient demonstrated decreased independence and safety during completion of ADL/functional transfer/mobility tasks. Pt would benefit from continued skilled OT to increase safety and independence with completion of ADL/IADL tasks for functional independence and quality of life. Treatment: OT treatment provided this date includes: Facilitation of bed mobility, unsupported sitting balance at EOB (impacting ADLs; addressing posture, weight shifting, dynamic reaching), functional sit to stand transfers, standing tolerance tasks (addressing posture, balance and activity tolerance while incorporating light functional reaching; impacting ADLs and functional activity) and side step to [facility] with with w/w  - skilled cuing on hand placement, posture, body mechanics, energy conservation techniques and safety. Therapist facilitated self-care retraining: UB/LB self-care tasks (robe, socks) and grooming tasks while educating pt on modified techniques, posture, safety and energy conservation techniques. Skilled monitoring of HR, O2 sats and pts response to treatment. Rehab Potential: Good for established goals     Patient / Family Goal: none stated      Patient and/or family were instructed on functional diagnosis, prognosis/goals and OT plan of care. Demonstrated fair understanding.      Eval Complexity: Low    Time In: 1330  Time Out: 1400  Total Treatment Time: 12 minutes    Min Units   OT Eval Low 97165  x  1   OT Eval Medium 97133      OT Eval High 69173      OT Re-Eval [id number]       Therapeutic Ex 48738       Therapeutic Activities 09849  9  1   ADL/Self Care 96041  3     Orthotic Management 27200       Manual [id number]     Neuro Re-Ed [id number]       Non-Billable Time          Evaluation Time additionally includes thorough review of current medical information, gathering information on past medical history/social history and prior level of function, interpretation of standardized testing/informal observation of tasks, assessment of data and development of plan of care and goals.           Sanjana Reynolds OTR/L #[id number]

## 2022-09-29 NOTE — PROGRESS NOTES
[facility] Attending    S: 73 y.o. female with  a history of afib (off of anticoagulation due to recent gi bleeding), HFpEF, chronic hypoxic respiratory failure on 6L NC, COPD, DI, ciff, htn, hypothyroidism, VAIN III, ischemic colitis, severe pulmonary hypertension, sarcoidosis, ventral hernia, fibromyalgia, avn on chronic opioid therapy who presented for hypoxia. Patient was admitted with gi bleed and uti and discharged on 9/27 to SNF. There found to sat 81% on NC and required NRB (trouble with bipap setup) so sent to ER. Is on lasix for leg edema as well. Pt given lasix, duonebs and bipap in ER with improvement of acid/base disorders on abg. Today, patient reports that her breathing is improved since yesterday and use of the bipap machine. O: VS- Blood pressure (!) 151/96, pulse 96, temperature (!) 96.7 °F (35.9 °C), temperature source Temporal, resp. rate 21, height 5' (1.524 m), weight 177 lb (80.3 kg), SpO2 100 %, not currently breastfeeding. Exam is as noted by resident with the following changes, additions or corrections:  Gen: NAD , on bipap 6L NC   HEENT: NCAT, on bipap machine  CV-RRR   Lungs-Coarse bs b/l  Ext-no C/C; tr edema b/l      Impressions:   Principal Problem:    Acute respiratory failure with hypoxia ([address]  Resolved Problems:    * No resolved hospital problems. *      Plan:   Wean oxygen as tolerated. Bipap. Continue chronic prednisone therapy. Lasix for hfpef. I and Os. Pulm consultation appreciated. Sputum cx. Bp control. UDS with chronic opiates. DDAVP for hx of DI. Possible outpatient Watchman evaluation. PT/OT evaluation. Attending Physician Statement  I have reviewed the chart and seen the patient with the resident(s). I personally reviewed images, EKG's and similar tests, if present.   I personally reviewed and performed key elements of the history and exam.  I have reviewed and confirmed student and/or resident history and exam with changes as indicated above. I agree with the assessment, plan and orders as documented by the resident. Please refer to the resident and/or student note for additional information.       Linden Raymond MD

## 2022-09-29 NOTE — PROGRESS NOTES
P Quality Flow/Interdisciplinary Rounds Progress Note        Quality Flow Rounds held on September 29, 2022    Disciplines Attending:  Bedside Nurse, , , and Nursing Unit Leadership    Genie Rice was admitted on 9/28/2022  5:04 AM    Anticipated Discharge Date:       Disposition:    Blas Score:  Blas Scale Score: 13    Readmission Risk              Risk of Unplanned Readmission:  36           Discussed patient goal for the day, patient clinical progression, and barriers to discharge.   The following Goal(s) of the Day/Commitment(s) have been identified:  Report labs/diagnostics      Nasim Sanford RN  September 29, 2022

## 2022-09-30 ENCOUNTER — APPOINTMENT (OUTPATIENT)
Dept: GENERAL RADIOLOGY | Age: 67
DRG: 291 | End: 2022-09-30
Payer: MEDICARE

## 2022-09-30 LAB
ALBUMIN SERPL-MCNC: 3.4 G/DL (ref 3.5–5.2)
ALP BLD-CCNC: 111 U/L (ref 35–104)
ALT SERPL-CCNC: 20 U/L (ref 0–32)
ANION GAP SERPL CALCULATED.3IONS-SCNC: 8 MMOL/L (ref 7–16)
ANISOCYTOSIS: ABNORMAL
AST SERPL-CCNC: 25 U/L (ref 0–31)
BASOPHILS ABSOLUTE: 0.02 E9/L (ref 0–0.2)
BASOPHILS RELATIVE PERCENT: 0.4 % (ref 0–2)
BILIRUB SERPL-MCNC: 0.6 MG/DL (ref 0–1.2)
BUN BLDV-MCNC: 12 MG/DL (ref 6–23)
CALCIUM SERPL-MCNC: 8.9 MG/DL (ref 8.6–10.2)
CHLORIDE BLD-SCNC: 91 MMOL/L (ref 98–107)
CO2: 38 MMOL/L (ref 22–29)
CREAT SERPL-MCNC: 0.8 MG/DL (ref 0.5–1)
EOSINOPHILS ABSOLUTE: 0.44 E9/L (ref 0.05–0.5)
EOSINOPHILS RELATIVE PERCENT: 8.5 % (ref 0–6)
GFR AFRICAN AMERICAN: >60
GFR NON-AFRICAN AMERICAN: >60 ML/MIN/1.73
GLUCOSE BLD-MCNC: 88 MG/DL (ref 74–99)
HCT VFR BLD CALC: 32.6 % (ref 34–48)
HEMOGLOBIN: 9.8 G/DL (ref 11.5–15.5)
HYPOCHROMIA: ABNORMAL
IMMATURE GRANULOCYTES #: 0.02 E9/L
IMMATURE GRANULOCYTES %: 0.4 % (ref 0–5)
LYMPHOCYTES ABSOLUTE: 0.45 E9/L (ref 1.5–4)
LYMPHOCYTES RELATIVE PERCENT: 8.7 % (ref 20–42)
MAGNESIUM: 1.8 MG/DL (ref 1.6–2.6)
MCH RBC QN AUTO: 25.7 PG (ref 26–35)
MCHC RBC AUTO-ENTMCNC: 30.1 % (ref 32–34.5)
MCV RBC AUTO: 85.3 FL (ref 80–99.9)
MONOCYTES ABSOLUTE: 0.56 E9/L (ref 0.1–0.95)
MONOCYTES RELATIVE PERCENT: 10.9 % (ref 2–12)
NEUTROPHILS ABSOLUTE: 3.66 E9/L (ref 1.8–7.3)
NEUTROPHILS RELATIVE PERCENT: 71.1 % (ref 43–80)
PDW BLD-RTO: 16.7 FL (ref 11.5–15)
PLATELET # BLD: 186 E9/L (ref 130–450)
PMV BLD AUTO: 11.1 FL (ref 7–12)
POIKILOCYTES: ABNORMAL
POLYCHROMASIA: ABNORMAL
POTASSIUM SERPL-SCNC: 3.3 MMOL/L (ref 3.5–5)
RBC # BLD: 3.82 E12/L (ref 3.5–5.5)
SODIUM BLD-SCNC: 137 MMOL/L (ref 132–146)
TOTAL PROTEIN: 6.6 G/DL (ref 6.4–8.3)
WBC # BLD: 5.2 E9/L (ref 4.5–11.5)

## 2022-09-30 PROCEDURE — 2580000003 HC RX 258

## 2022-09-30 PROCEDURE — 83735 ASSAY OF MAGNESIUM: CPT

## 2022-09-30 PROCEDURE — 71045 X-RAY EXAM CHEST 1 VIEW: CPT

## 2022-09-30 PROCEDURE — 94640 AIRWAY INHALATION TREATMENT: CPT

## 2022-09-30 PROCEDURE — 80053 COMPREHEN METABOLIC PANEL: CPT

## 2022-09-30 PROCEDURE — 2700000000 HC OXYGEN THERAPY PER DAY

## 2022-09-30 PROCEDURE — 6370000000 HC RX 637 (ALT 250 FOR IP): Performed by: FAMILY MEDICINE

## 2022-09-30 PROCEDURE — 85025 COMPLETE CBC W/AUTO DIFF WBC: CPT

## 2022-09-30 PROCEDURE — 2140000000 HC CCU INTERMEDIATE R&B

## 2022-09-30 PROCEDURE — 6360000002 HC RX W HCPCS

## 2022-09-30 PROCEDURE — 6360000002 HC RX W HCPCS: Performed by: FAMILY MEDICINE

## 2022-09-30 PROCEDURE — 6370000000 HC RX 637 (ALT 250 FOR IP)

## 2022-09-30 PROCEDURE — 36415 COLL VENOUS BLD VENIPUNCTURE: CPT

## 2022-09-30 PROCEDURE — 94660 CPAP INITIATION&MGMT: CPT

## 2022-09-30 PROCEDURE — 99232 SBSQ HOSP IP/OBS MODERATE 35: CPT | Performed by: FAMILY MEDICINE

## 2022-09-30 RX ORDER — POTASSIUM CHLORIDE 20 MEQ/1
40 TABLET, EXTENDED RELEASE ORAL ONCE
Status: COMPLETED | OUTPATIENT
Start: 2022-09-30 | End: 2022-09-30

## 2022-09-30 RX ORDER — MAGNESIUM SULFATE IN WATER 40 MG/ML
2000 INJECTION, SOLUTION INTRAVENOUS ONCE
Status: COMPLETED | OUTPATIENT
Start: 2022-09-30 | End: 2022-09-30

## 2022-09-30 RX ORDER — DEXTRAN 70/HYPROMELLOSE
2 DROPS OPHTHALMIC (EYE) PRN
Status: DISCONTINUED | OUTPATIENT
Start: 2022-09-30 | End: 2022-10-04 | Stop reason: HOSPADM

## 2022-09-30 RX ORDER — POLYVINYL ALCOHOL 14 MG/ML
2 SOLUTION/ DROPS OPHTHALMIC PRN
Status: DISCONTINUED | OUTPATIENT
Start: 2022-09-30 | End: 2022-09-30

## 2022-09-30 RX ADMIN — DILTIAZEM HYDROCHLORIDE 30 MG: 30 TABLET, FILM COATED ORAL at 22:17

## 2022-09-30 RX ADMIN — DESMOPRESSIN ACETATE 200 MCG: 0.1 TABLET ORAL at 09:22

## 2022-09-30 RX ADMIN — DILTIAZEM HYDROCHLORIDE 30 MG: 30 TABLET, FILM COATED ORAL at 14:17

## 2022-09-30 RX ADMIN — MAGNESIUM SULFATE HEPTAHYDRATE 2000 MG: 40 INJECTION, SOLUTION INTRAVENOUS at 17:22

## 2022-09-30 RX ADMIN — POTASSIUM CHLORIDE 40 MEQ: 1500 TABLET, EXTENDED RELEASE ORAL at 17:10

## 2022-09-30 RX ADMIN — PREDNISONE 5 MG: 5 TABLET ORAL at 09:22

## 2022-09-30 RX ADMIN — DEXTRAN 70, GLYCERIN, HYPROMELLOSE 2 DROP: 1; 2; 3 SOLUTION/ DROPS OPHTHALMIC at 17:10

## 2022-09-30 RX ADMIN — OMEGA-3-ACID ETHYL ESTERS 1 G: 1 CAPSULE, LIQUID FILLED ORAL at 09:21

## 2022-09-30 RX ADMIN — BUDESONIDE 500 MCG: 0.5 SUSPENSION RESPIRATORY (INHALATION) at 19:46

## 2022-09-30 RX ADMIN — ESCITALOPRAM OXALATE 10 MG: 10 TABLET ORAL at 09:15

## 2022-09-30 RX ADMIN — BUDESONIDE 500 MCG: 0.5 SUSPENSION RESPIRATORY (INHALATION) at 09:56

## 2022-09-30 RX ADMIN — METOPROLOL SUCCINATE 100 MG: 100 TABLET, FILM COATED, EXTENDED RELEASE ORAL at 09:15

## 2022-09-30 RX ADMIN — FERROUS SULFATE TAB 325 MG (65 MG ELEMENTAL FE) 325 MG: 325 (65 FE) TAB at 09:14

## 2022-09-30 RX ADMIN — ARFORMOTEROL TARTRATE 15 MCG: 15 SOLUTION RESPIRATORY (INHALATION) at 09:55

## 2022-09-30 RX ADMIN — SODIUM CHLORIDE, PRESERVATIVE FREE 10 ML: 5 INJECTION INTRAVENOUS at 22:19

## 2022-09-30 RX ADMIN — FUROSEMIDE 40 MG: 10 INJECTION, SOLUTION INTRAMUSCULAR; INTRAVENOUS at 09:14

## 2022-09-30 RX ADMIN — FAMOTIDINE 20 MG: 20 TABLET, FILM COATED ORAL at 09:15

## 2022-09-30 RX ADMIN — ARFORMOTEROL TARTRATE 15 MCG: 15 SOLUTION RESPIRATORY (INHALATION) at 19:46

## 2022-09-30 RX ADMIN — DESMOPRESSIN ACETATE 200 MCG: 0.1 TABLET ORAL at 22:18

## 2022-09-30 RX ADMIN — METOPROLOL SUCCINATE 50 MG: 25 TABLET, EXTENDED RELEASE ORAL at 09:16

## 2022-09-30 RX ADMIN — DILTIAZEM HYDROCHLORIDE 30 MG: 30 TABLET, FILM COATED ORAL at 09:15

## 2022-09-30 RX ADMIN — OXYCODONE AND ACETAMINOPHEN 1 TABLET: 5; 325 TABLET ORAL at 15:09

## 2022-09-30 RX ADMIN — LEVOTHYROXINE SODIUM 75 MCG: 0.07 TABLET ORAL at 09:22

## 2022-09-30 RX ADMIN — OXYCODONE AND ACETAMINOPHEN 1 TABLET: 5; 325 TABLET ORAL at 09:08

## 2022-09-30 RX ADMIN — FERROUS SULFATE TAB 325 MG (65 MG ELEMENTAL FE) 325 MG: 325 (65 FE) TAB at 22:18

## 2022-09-30 RX ADMIN — OMEGA-3-ACID ETHYL ESTERS 1 G: 1 CAPSULE, LIQUID FILLED ORAL at 22:17

## 2022-09-30 RX ADMIN — SODIUM CHLORIDE, PRESERVATIVE FREE 10 ML: 5 INJECTION INTRAVENOUS at 09:15

## 2022-09-30 RX ADMIN — OXYCODONE AND ACETAMINOPHEN 1 TABLET: 5; 325 TABLET ORAL at 22:17

## 2022-09-30 RX ADMIN — Medication 1000 UNITS: at 09:15

## 2022-09-30 ASSESSMENT — PAIN DESCRIPTION - ORIENTATION
ORIENTATION: MID
ORIENTATION: RIGHT;LEFT;LOWER

## 2022-09-30 ASSESSMENT — PAIN SCALES - GENERAL
PAINLEVEL_OUTOF10: 10
PAINLEVEL_OUTOF10: 10
PAINLEVEL_OUTOF10: 0
PAINLEVEL_OUTOF10: 7
PAINLEVEL_OUTOF10: 8
PAINLEVEL_OUTOF10: 0

## 2022-09-30 ASSESSMENT — PAIN DESCRIPTION - LOCATION
LOCATION: BACK

## 2022-09-30 ASSESSMENT — PAIN DESCRIPTION - DESCRIPTORS
DESCRIPTORS: ACHING;DISCOMFORT;SORE
DESCRIPTORS: ACHING

## 2022-09-30 NOTE — CARE COORDINATION
Spoke with [facility], they accepted and will start precert today. PASSR completed. Envelope, ambulance form, and completed PASSR in soft chart. Jeremias Arambula, MSW, LSW

## 2022-09-30 NOTE — PROGRESS NOTES
[facility] Attending    S: 73 y.o. female with  a history of afib (off of anticoagulation due to recent gi bleeding), HFpEF, chronic hypoxic respiratory failure on 6L NC, COPD, DI, ciff, htn, hypothyroidism, VAIN III, ischemic colitis, severe pulmonary hypertension, sarcoidosis, ventral hernia, fibromyalgia, avn on chronic opioid therapy who presented for hypoxia. Patient was admitted with gi bleed and uti and discharged on 9/27 to SNF. There found to sat 81% on NC and required NRB (trouble with bipap setup) so sent to ER. Is on lasix for leg edema as well. Pt given lasix, duonebs and bipap in ER with improvement of acid/base disorders on abg. Today, patient reports that her breathing is improving. She is using the incentive spirometry and motivated to get better. Had low urine output yesterday. O: VS- Blood pressure 120/81, pulse 58, temperature 97.2 °F (36.2 °C), resp. rate 20, height 5' (1.524 m), weight 177 lb (80.3 kg), SpO2 100 %, not currently breastfeeding. Exam is as noted by resident with the following changes, additions or corrections:  Gen: NAD 6L NC sitting upright in chair  HEENT: NCAT  CV-RRR   Lungs-Coarse bs b/l  Ext-no C/C; tr edema b/l      Impressions:   Principal Problem:    Acute respiratory failure with hypoxia ([address]  Resolved Problems:    * No resolved hospital problems. *      Plan:   Wean oxygen as tolerated. AVAPS. Continue chronic prednisone therapy. Lasix 40 IV (cxr with increased pulmonary vascularity) for hfpef. I and Os. Pulm consultation appreciated. Sputum cx. On home oxygen amount. Clinically improving    Bp control. Urine output ok    DDAVP for hx of DI. Possible outpatient Watchman evaluation. Precert for [employer]. Attending Physician Statement  I have reviewed the chart and seen the patient with the resident(s). I personally reviewed images, EKG's and similar tests, if present.   I personally reviewed and performed key elements of the history and exam.  I have reviewed and confirmed student and/or resident history and exam with changes as indicated above. I agree with the assessment, plan and orders as documented by the resident. Please refer to the resident and/or student note for additional information.       Niru Harding MD

## 2022-09-30 NOTE — PROGRESS NOTES
[facility] Inpatient   Resident Progress Note    S:  Hospital day: 2   Brief Synopsis: Finesse Storey is a 73 y.o. female with a PMH of COPD, sarcoidosis, CHF, pulmonary hypertension, A. fib, chronic pain disorder, chronic opioid use, who presents to ED for Shortness of Breath. She is on 4L O2 chronically and intermittent BIPAP. Patient was just discharged from hospital yesterday and was sent to [facility]. She was in the hospital for rectal bleeding. She states that while she was at [facility] the staff did not know how to adjust her BiPAP settings. Due to this she became increasingly more short of breath as the day went on. The shortness of breath was accompanied by chest tightness and increased sputum production. She states that she asked for a breathing treatment but was given her inhaler to use x1. She did receive her Lasix dose yesterday. She states that since starting Lasix her lower extremity edema has been improved. Last exercise stress test from October 2016 demonstrated normal LV wall motion, myocardial thickening during systole and EF 76%. Last echo from 7/15/2022 showing EF of 60%, moderate left ventricular concentric hypertrophy, dilated right ventricle with reduced function and severe pulmonary hypertension. CT from 9/10/2022 demonstrating opacities in lung bases suggestive of subsegmental atelectasis and chronic interstitial lung disease. ED workup showed: /134, ProBNP 0402, ABG Metabolic alkalosis w/compensated respiratory acidosis and CXR with patchy bilateral airspace opacities suggesting atelectasis or mild multi-lobar interstitial pneumonitis. She was given Lasix 20mg IV and placed on bipap. Was admitted for acute hypoxic respiratory failure. Overnight/interim:   No events overnight. Patient seen and evaluated at bedside this morning. She was resting comfortably in bed with bipap in place saturation at 100%.  She has been wearing it mainly to sleep and has been on NC during the day. She is having decreased urine output. She is currently on Lasix 40mg IV. She denies chest pain, shortness of breath, nausea or vomiting. Cont meds:    sodium chloride       Scheduled meds:    Vitamin D  1,000 Units Oral Daily    furosemide  40 mg IntraVENous Daily    desmopressin  200 mcg Oral BID    dilTIAZem  30 mg Oral TID    escitalopram  10 mg Oral Daily    famotidine  20 mg Oral Daily    ferrous sulfate  325 mg Oral BID    [Held by provider] furosemide  40 mg Oral Once per day on Mon Wed Fri    levothyroxine  75 mcg Oral Daily    metoprolol succinate  100 mg Oral Daily    metoprolol succinate  50 mg Oral Daily    omega-3 acid ethyl esters  1 g Oral BID    predniSONE  5 mg Oral Daily    lidocaine  1 patch TransDERmal Daily    diclofenac sodium  4 g Topical BID    sodium chloride flush  5-40 mL IntraVENous 2 times per day    Arformoterol Tartrate  15 mcg Nebulization BID    budesonide  500 mcg Nebulization BID     PRN meds: oxyCODONE-acetaminophen, sodium chloride flush, sodium chloride, polyethylene glycol, acetaminophen **OR** acetaminophen, promethazine **OR** ondansetron, albuterol     I reviewed the patient's past medical and surgical history, Medications and Allergies. O:  /78   Pulse 96   Temp 97.4 °F (36.3 °C) (Temporal)   Resp 20   Ht 5' (1.524 m)   Wt 177 lb (80.3 kg)   LMP  (LMP Unknown)   SpO2 100%   BMI 34.57 kg/m²   24 hour I&O: I/O last 3 completed shifts: In: 460 [P.O.:460]  Out: 1150 [Urine:1150]  No intake/output data recorded. Physical Exam  Vitals reviewed. Constitutional:       General: She is not in acute distress. Appearance: She is obese. Interventions: Face mask in place. Cardiovascular:      Rate and Rhythm: Rhythm irregular. Pulmonary:      Breath sounds: Decreased air movement present. Abdominal:      Palpations: Abdomen is soft. Tenderness: There is no abdominal tenderness.       Comments: Gauze overlying repaired ventral hernia    Genitourinary:     Comments: Sanchez in place   Musculoskeletal:      Right lower le+ Edema present. Left lower le+ Edema present. Right foot: No swelling. Left foot: Deformity present. No swelling. Psychiatric:         Behavior: Behavior is cooperative. Labs:  Na/K/Cl/CO2:  140/3.9/92/37 ( 7962)  BUN/Cr/glu/ALT/AST/amyl/lip:  11/0.8/--//19/--/-- ( 6983)  WBC/Hgb/Hct/Plts:  5.4/9.0/30.1/167 ( 5732)  estimated creatinine clearance is 66 mL/min (based on SCr of 0.8 mg/dL). Other pertinent labs as noted below    Radiology:  XR CHEST PORTABLE   Final Result   Markings seen diffusely throughout the right lung slightly worsened in the   interval with slight worsening of the markings at the left lung base. XR CHEST PORTABLE   Final Result   Unchanged appearance from the day prior of shallow inspiration and patchy   bilateral airspace opacities suggesting atelectasis or mild multi lobar   pneumonitis. RECOMMENDATION:   Careful clinical correlation and follow up recommended. XR CHEST PORTABLE    (Results Pending)         Resident Assessment and Plan   Acute Hypoxic Respiratory Failure  -Patient with Sarcoid, COPD, CHF  -DUONEB, Pulmicort, Brovana  -Continue to wean Bipap  -HOLD home dose of Lasix 40mg 3 times weekly (Patient received dose of 20mg in ED today and received 40mg yesterday) - Continue to evaluate for need  -Patient will need placement to a facility that can manage her bipap settings   -ABG: Metabolic Alkalosis with compensated respiratory acidosis  -CXR on day of admission: Patchy bilateral airspace opacities suggesting atelectasis or mild multi-lobar pneumonitis   -CXR 22: Markings seen diffusely throughout the right lung slightly worsened in the interval with slight worsening of the markings at the left lung base. -CXR 22: Slightly improved on left. -Pulmonology following: Added 40mg IV lasix.  May consider discontinuing pending creatinine.   -Awaiting cultures   -Flutter valve   -Vitamin D level insufficient: Add supplement   -Procalcitonin: 0.11 (0.09 on last admission)  -UDS+ for oxycodone  -Will need sleep study outpatient   -Telemetry  -Strict I/Os: Output 0.2cc/kg/hr (Decreased output yesterday)  -Add compression stockings      Decreased Urine Output  -0.2cc/kg/hr on 22  -May consider renal ultrasound or nephrology consult pending output today     Sarcoidosis  -Continue on Prednisone 5mg daily     Elevated troponin  -Downtrending  -Will not repeat at this time  -Telemetry    Atrial Fibrillation  -Continue Toprol XL (100+50mg)  -Continue on Cardizem 30mg TID   -Patient has been on Eliquis in the past but no longer on it. Discontinue for now due to recent GI bleed     Chronic Pain  -Patient on opioids chronically. -Need to discuss pain management plan. Per PDMP patient's opioid use has decreased   -Add lidocaine patch and Diclofenac gel   -UDS+ for oxycodone     Diabetes Insipidus  -Likely secondary to Sarcoidosis  -Continue home dose DDAVP     Chronic Anemia  -Hgb downtrendin.0 today   -Likely due to chronic disease  -Continue on ferrous sulfate 325 BID      Hypothyroidism  -Continue home Levothyroxine 75     Depression  -Continue home Lexapro 10mg      GERD  -Continue home Pepcid     PT/OT: Consult  GI: Pepcid  DVT prophylaxis: PCDs due to recent GI bleed   Dispo:  Admit while awaiting placement   Diet: Low sodium        Electronically signed by Andrez Hood, DO on 2022 at 9:18 AM  Attending physician: Dr. Rosmery Barnes

## 2022-09-30 NOTE — PATIENT CARE CONFERENCE
[facility] Quality Flow/Interdisciplinary Rounds Progress Note        Quality Flow Rounds held on September 30, 2022    Disciplines Attending:  Bedside Nurse, , , and Nursing Unit Leadership    Alex Wyatt was admitted on 9/28/2022  5:04 AM    Anticipated Discharge Date:  Expected Discharge Date: 09/30/22    Disposition:    Blas Score:  Blas Scale Score: 17    Readmission Risk              Risk of Unplanned Readmission:  37           Discussed patient goal for the day, patient clinical progression, and barriers to discharge.   The following Goal(s) of the Day/Commitment(s) have been identified:  Other  wean O2      Sarahy Steinberg RN  September 30, 2022

## 2022-09-30 NOTE — PROGRESS NOTES
Anuj Santacruz M.D.,[facility]  Baltazar Mathis D.O., F.A.C.O.I., Mervat Velarde M.D. Simi Mercado M.D. Noah Lazo D.O. Daily Pulmonary Progress Note    Patient:  Jad Clemente 73 y.o. female MRN: [id number]     Date of Service: 9/30/2022      Synopsis     We are following patient for respiratory failure, sarcoidosis     \"CC\"  rectal bleeding    Code status: FULL       Subjective     Patient was seen and examined. Sitting up in chair 95% on 6L NC in NAD. Oxygen weaned to 5 L. Patient states her breathing is much better. Denies respiratory complaints at this time. Encouraged her to continue using incentive spirometer.   She is tolerating PAP therapy overnight    Review of Systems:  C/o shortness of breath  Denies headache  Denies N/V/D  Denies dizziness    24-hour events:  none    Objective   Vitals: /78   Pulse 96   Temp 97.4 °F (36.3 °C) (Temporal)   Resp 20   Ht 5' (1.524 m)   Wt 177 lb (80.3 kg)   LMP  (LMP Unknown)   SpO2 100%   BMI 34.57 kg/m²     I/O:    Intake/Output Summary (Last 24 hours) at 9/30/2022 1304  Last data filed at 9/30/2022 1044  Gross per 24 hour   Intake 360 ml   Output 1000 ml   Net -640 ml          IPAP: 15 cmH20  CPAP/EPAP: 6 cmH2O     CURRENT MEDS :  Scheduled Meds:   Vitamin D  1,000 Units Oral Daily    furosemide  40 mg IntraVENous Daily    desmopressin  200 mcg Oral BID    dilTIAZem  30 mg Oral TID    escitalopram  10 mg Oral Daily    famotidine  20 mg Oral Daily    ferrous sulfate  325 mg Oral BID    [Held by provider] furosemide  40 mg Oral Once per day on Mon Wed Fri    levothyroxine  75 mcg Oral Daily    metoprolol succinate  100 mg Oral Daily    metoprolol succinate  50 mg Oral Daily    omega-3 acid ethyl esters  1 g Oral BID    predniSONE  5 mg Oral Daily    lidocaine  1 patch TransDERmal Daily    diclofenac sodium  4 g Topical BID    sodium chloride flush  5-40 mL IntraVENous 2 times per day    Arformoterol Tartrate  15 mcg Nebulization BID    budesonide  500 mcg Nebulization BID       Physical Exam:  General Appearance: appears comfortable in no acute distress. HEENT: Normocephalic atraumatic without obvious abnormality   Neck: Lips, mucosa, and tongue normal. Supple, symmetrical, trachea midline, no adenopathy;thyroid: no enlargement/tenderness/nodules or JVD. Lung: Breath sounds diminished bibasilar. Respirations unlabored. Symmetrical expansion. Heart: irregular   Abdomen: Soft, NT, ND. BS present x 4 quadrants. No bruit or organomegaly. Extremities: Pedal pulses 2+ symmetric b/l. Extremities normal, no cyanosis, clubbing, or edema. Musculokeletal: No joint swelling, no muscle tenderness. ROM normal in all joints of extremities. Neurologic: Mental status: Alert and oriented     Pertinent/ New Labs and Imaging Studies     Imaging Personally Reviewed:  CXR 9/30  Bilateral airspace disease and or atelectasis with slightly improved aeration   on the left. Echo 7/16/22   Ejection fraction is visually estimated at 60%. No regional wall motion abnormalities seen. Moderate left ventricular concentric hypertrophy noted. Dilated right ventricle with reduced function. Left atrial volume index of 34 ml per meters squared BSA. Moderate mitral regurgitation is present. Moderate tricuspid regurgitation. Pulmonary hypertension is severe. RVSP is 70 mmHg.    No evidence for hemodynamically significant pericardial effusion      Labs:  Lab Results   Component Value Date/Time    WBC 5.2 09/30/2022 10:43 AM    HGB 9.8 09/30/2022 10:43 AM    HCT 32.6 09/30/2022 10:43 AM    MCV 85.3 09/30/2022 10:43 AM    MCH 25.7 09/30/2022 10:43 AM    MCHC 30.1 09/30/2022 10:43 AM    RDW 16.7 09/30/2022 10:43 AM     09/30/2022 10:43 AM    MPV 11.1 09/30/2022 10:43 AM     Lab Results   Component Value Date/Time     09/30/2022 10:43 AM    K 3.3 09/30/2022 10:43 AM    K 3.9 09/29/2022 04:32 AM    CL 91 09/30/2022 10:43 AM    CO2 38 09/30/2022 10:43 AM    BUN 12 09/30/2022 10:43 AM    CREATININE 0.8 09/30/2022 10:43 AM    LABALBU 3.4 09/30/2022 10:43 AM    LABALBU 3.6 05/02/2012 07:40 PM    CALCIUM 8.9 09/30/2022 10:43 AM    GFRAA >60 09/30/2022 10:43 AM    LABGLOM >60 09/30/2022 10:43 AM     Lab Results   Component Value Date/Time    PROTIME 11.0 03/11/2022 11:29 AM    PROTIME 12.7 05/02/2012 07:40 PM    INR 1.0 03/11/2022 11:29 AM     Recent Labs     09/28/22  0521   PROBNP 6,282*       Recent Labs     09/28/22  0800   PROCAL 0.11*       This SmartLink has not been configured with any valid records. Abg 9/16/22  7.22/72/330/29/0.6/99    Micro:  9/27 COVID (-)     Assessment:    Acute on chronic respiratory failure with hypoxia and hypercapnia  GI bleed, Anemia  Pulmonary sarcoidosis  Restrictive lung disease  Obesity hypoventilation syndrome  LINDSAY, noncompliant with AVAPs  Chronic home O2 dependence 4 liters NC  Severe pulmonary hypertension WHO group 2, 3-chronic  Stage III severe COPD by Gold classification  Acute on chronic congestive heart failure with preserved EF  Moderate MR, Moderate TR  Chronic dyspnea  [place]'s disease  Diabetes mellitus II  Stage III chronic kidney disease  History of nicotine dependence  PAF  Obesity, BMI 35  Debility       Plan:   Wean oxygen as tolerated keep SpO2 between 88-92%. AVAPS HS and PRN-as much as patient tolerates  Bronchodilators: Brovana/Pulmicort twice daily, albuterol as needed  Incentive spirometer  CXR improved   Lasix 40mg IV daily, 750cc output  Continue DDAVP  Chronic prednisone 5 mg daily  GI/DVT prophylaxis   Precert started today for patient to go to [facility] of care reviewed in collaboration with Dr. Dawn España  Electronically signed by FRIDA Danielson CNP on 9/30/2022 at 1:04 PM      I personally saw, examined, and cared for the patient. Labs, medications, radiographs reviewed. I agree with history exam and plans detailed in NP note.         Rochelle Zacarias DO

## 2022-09-30 NOTE — PLAN OF CARE
Problem: Chronic Conditions and Co-morbidities  Goal: Patient's chronic conditions and co-morbidity symptoms are monitored and maintained or improved  Outcome: Progressing     Problem: Discharge Planning  Goal: Discharge to home or other facility with appropriate resources  Outcome: Progressing     Problem: Pain  Goal: Verbalizes/displays adequate comfort level or baseline comfort level  Outcome: Progressing     Problem: Skin/Tissue Integrity  Goal: Absence of new skin breakdown  Description: 1. Monitor for areas of redness and/or skin breakdown  2. Assess vascular access sites hourly  3. Every 4-6 hours minimum:  Change oxygen saturation probe site  4. Every 4-6 hours:  If on nasal continuous positive airway pressure, respiratory therapy assess nares and determine need for appliance change or resting period.   Outcome: Progressing     Problem: ABCDS Injury Assessment  Goal: Absence of physical injury  Outcome: Progressing     Problem: Safety - Adult  Goal: Free from fall injury  Outcome: Progressing     Problem: Cardiovascular - Adult  Goal: Maintains optimal cardiac output and hemodynamic stability  Outcome: Progressing     Problem: Metabolic/Fluid and Electrolytes - Adult  Goal: Hemodynamic stability and optimal renal function maintained  Outcome: Progressing

## 2022-10-01 PROBLEM — J96.01 ACUTE RESPIRATORY FAILURE WITH HYPOXIA (HCC): Status: RESOLVED | Noted: 2022-09-28 | Resolved: 2022-10-01

## 2022-10-01 LAB
ALBUMIN SERPL-MCNC: 3 G/DL (ref 3.5–5.2)
ALP BLD-CCNC: 92 U/L (ref 35–104)
ALT SERPL-CCNC: 18 U/L (ref 0–32)
ANION GAP SERPL CALCULATED.3IONS-SCNC: 8 MMOL/L (ref 7–16)
ANISOCYTOSIS: ABNORMAL
AST SERPL-CCNC: 19 U/L (ref 0–31)
BASOPHILS ABSOLUTE: 0.01 E9/L (ref 0–0.2)
BASOPHILS RELATIVE PERCENT: 0.2 % (ref 0–2)
BILIRUB SERPL-MCNC: 0.5 MG/DL (ref 0–1.2)
BUN BLDV-MCNC: 14 MG/DL (ref 6–23)
CALCIUM SERPL-MCNC: 8.7 MG/DL (ref 8.6–10.2)
CHLORIDE BLD-SCNC: 92 MMOL/L (ref 98–107)
CO2: 38 MMOL/L (ref 22–29)
CREAT SERPL-MCNC: 0.9 MG/DL (ref 0.5–1)
EOSINOPHILS ABSOLUTE: 0.3 E9/L (ref 0.05–0.5)
EOSINOPHILS RELATIVE PERCENT: 6.2 % (ref 0–6)
GFR AFRICAN AMERICAN: >60
GFR NON-AFRICAN AMERICAN: >60 ML/MIN/1.73
GLUCOSE BLD-MCNC: 81 MG/DL (ref 74–99)
HCT VFR BLD CALC: 28.9 % (ref 34–48)
HEMOGLOBIN: 8.6 G/DL (ref 11.5–15.5)
HYPOCHROMIA: ABNORMAL
IMMATURE GRANULOCYTES #: 0.01 E9/L
IMMATURE GRANULOCYTES %: 0.2 % (ref 0–5)
LYMPHOCYTES ABSOLUTE: 0.45 E9/L (ref 1.5–4)
LYMPHOCYTES RELATIVE PERCENT: 9.4 % (ref 20–42)
MCH RBC QN AUTO: 24.6 PG (ref 26–35)
MCHC RBC AUTO-ENTMCNC: 29.8 % (ref 32–34.5)
MCV RBC AUTO: 82.8 FL (ref 80–99.9)
MONOCYTES ABSOLUTE: 0.57 E9/L (ref 0.1–0.95)
MONOCYTES RELATIVE PERCENT: 11.9 % (ref 2–12)
NEUTROPHILS ABSOLUTE: 3.47 E9/L (ref 1.8–7.3)
NEUTROPHILS RELATIVE PERCENT: 72.1 % (ref 43–80)
OVALOCYTES: ABNORMAL
PDW BLD-RTO: 16.6 FL (ref 11.5–15)
PLATELET # BLD: 161 E9/L (ref 130–450)
PMV BLD AUTO: 10.5 FL (ref 7–12)
POIKILOCYTES: ABNORMAL
POLYCHROMASIA: ABNORMAL
POTASSIUM SERPL-SCNC: 3.6 MMOL/L (ref 3.5–5)
RBC # BLD: 3.49 E12/L (ref 3.5–5.5)
SODIUM BLD-SCNC: 138 MMOL/L (ref 132–146)
TARGET CELLS: ABNORMAL
TEAR DROP CELLS: ABNORMAL
TOTAL PROTEIN: 5.8 G/DL (ref 6.4–8.3)
WBC # BLD: 4.8 E9/L (ref 4.5–11.5)

## 2022-10-01 PROCEDURE — 2140000000 HC CCU INTERMEDIATE R&B

## 2022-10-01 PROCEDURE — 6360000002 HC RX W HCPCS

## 2022-10-01 PROCEDURE — 85025 COMPLETE CBC W/AUTO DIFF WBC: CPT

## 2022-10-01 PROCEDURE — 2580000003 HC RX 258

## 2022-10-01 PROCEDURE — 94660 CPAP INITIATION&MGMT: CPT

## 2022-10-01 PROCEDURE — 6370000000 HC RX 637 (ALT 250 FOR IP): Performed by: FAMILY MEDICINE

## 2022-10-01 PROCEDURE — 99232 SBSQ HOSP IP/OBS MODERATE 35: CPT | Performed by: FAMILY MEDICINE

## 2022-10-01 PROCEDURE — 6370000000 HC RX 637 (ALT 250 FOR IP)

## 2022-10-01 PROCEDURE — 94640 AIRWAY INHALATION TREATMENT: CPT

## 2022-10-01 PROCEDURE — 36415 COLL VENOUS BLD VENIPUNCTURE: CPT

## 2022-10-01 PROCEDURE — 94667 MNPJ CHEST WALL 1ST: CPT

## 2022-10-01 PROCEDURE — 80053 COMPREHEN METABOLIC PANEL: CPT

## 2022-10-01 PROCEDURE — 2700000000 HC OXYGEN THERAPY PER DAY

## 2022-10-01 RX ORDER — PSEUDOEPHEDRINE HCL 30 MG
100 TABLET ORAL DAILY
Qty: 30 CAPSULE | DISCHARGE
Start: 2022-10-02 | End: 2022-11-01

## 2022-10-01 RX ORDER — FUROSEMIDE 40 MG/1
40 TABLET ORAL DAILY
Qty: 90 TABLET | Refills: 0 | DISCHARGE
Start: 2022-10-01 | End: 2022-12-30

## 2022-10-01 RX ORDER — DOCUSATE SODIUM 100 MG/1
100 CAPSULE, LIQUID FILLED ORAL DAILY
Status: DISCONTINUED | OUTPATIENT
Start: 2022-10-01 | End: 2022-10-04 | Stop reason: HOSPADM

## 2022-10-01 RX ADMIN — SODIUM CHLORIDE, PRESERVATIVE FREE 10 ML: 5 INJECTION INTRAVENOUS at 08:59

## 2022-10-01 RX ADMIN — FAMOTIDINE 20 MG: 20 TABLET, FILM COATED ORAL at 08:58

## 2022-10-01 RX ADMIN — SODIUM CHLORIDE, PRESERVATIVE FREE 10 ML: 5 INJECTION INTRAVENOUS at 21:03

## 2022-10-01 RX ADMIN — ARFORMOTEROL TARTRATE 15 MCG: 15 SOLUTION RESPIRATORY (INHALATION) at 12:37

## 2022-10-01 RX ADMIN — DILTIAZEM HYDROCHLORIDE 30 MG: 30 TABLET, FILM COATED ORAL at 14:00

## 2022-10-01 RX ADMIN — Medication 1000 UNITS: at 08:58

## 2022-10-01 RX ADMIN — DESMOPRESSIN ACETATE 200 MCG: 0.1 TABLET ORAL at 08:59

## 2022-10-01 RX ADMIN — BUDESONIDE 500 MCG: 0.5 SUSPENSION RESPIRATORY (INHALATION) at 12:37

## 2022-10-01 RX ADMIN — ESCITALOPRAM OXALATE 10 MG: 10 TABLET ORAL at 08:58

## 2022-10-01 RX ADMIN — BUDESONIDE 500 MCG: 0.5 SUSPENSION RESPIRATORY (INHALATION) at 20:14

## 2022-10-01 RX ADMIN — DICLOFENAC SODIUM 4 G: 10 GEL TOPICAL at 09:00

## 2022-10-01 RX ADMIN — ARFORMOTEROL TARTRATE 15 MCG: 15 SOLUTION RESPIRATORY (INHALATION) at 20:14

## 2022-10-01 RX ADMIN — OMEGA-3-ACID ETHYL ESTERS 1 G: 1 CAPSULE, LIQUID FILLED ORAL at 08:58

## 2022-10-01 RX ADMIN — DOCUSATE SODIUM 100 MG: 100 CAPSULE, LIQUID FILLED ORAL at 09:02

## 2022-10-01 RX ADMIN — FUROSEMIDE 40 MG: 10 INJECTION, SOLUTION INTRAMUSCULAR; INTRAVENOUS at 08:59

## 2022-10-01 RX ADMIN — PREDNISONE 5 MG: 5 TABLET ORAL at 08:59

## 2022-10-01 RX ADMIN — OMEGA-3-ACID ETHYL ESTERS 1 G: 1 CAPSULE, LIQUID FILLED ORAL at 21:03

## 2022-10-01 RX ADMIN — METOPROLOL SUCCINATE 100 MG: 100 TABLET, FILM COATED, EXTENDED RELEASE ORAL at 08:58

## 2022-10-01 RX ADMIN — OXYCODONE AND ACETAMINOPHEN 1 TABLET: 5; 325 TABLET ORAL at 23:59

## 2022-10-01 RX ADMIN — DILTIAZEM HYDROCHLORIDE 30 MG: 30 TABLET, FILM COATED ORAL at 21:03

## 2022-10-01 RX ADMIN — OXYCODONE AND ACETAMINOPHEN 1 TABLET: 5; 325 TABLET ORAL at 16:00

## 2022-10-01 RX ADMIN — METOPROLOL SUCCINATE 50 MG: 25 TABLET, EXTENDED RELEASE ORAL at 09:00

## 2022-10-01 RX ADMIN — LEVOTHYROXINE SODIUM 75 MCG: 0.07 TABLET ORAL at 09:02

## 2022-10-01 RX ADMIN — OXYCODONE AND ACETAMINOPHEN 1 TABLET: 5; 325 TABLET ORAL at 09:46

## 2022-10-01 RX ADMIN — DILTIAZEM HYDROCHLORIDE 30 MG: 30 TABLET, FILM COATED ORAL at 08:58

## 2022-10-01 RX ADMIN — DESMOPRESSIN ACETATE 200 MCG: 0.1 TABLET ORAL at 21:03

## 2022-10-01 ASSESSMENT — PAIN DESCRIPTION - LOCATION
LOCATION: BACK
LOCATION: BACK

## 2022-10-01 ASSESSMENT — PAIN DESCRIPTION - DESCRIPTORS
DESCRIPTORS: ACHING
DESCRIPTORS: ACHING;DISCOMFORT

## 2022-10-01 ASSESSMENT — PAIN - FUNCTIONAL ASSESSMENT
PAIN_FUNCTIONAL_ASSESSMENT: PREVENTS OR INTERFERES SOME ACTIVE ACTIVITIES AND ADLS
PAIN_FUNCTIONAL_ASSESSMENT: ACTIVITIES ARE NOT PREVENTED

## 2022-10-01 ASSESSMENT — PAIN DESCRIPTION - ORIENTATION: ORIENTATION: MID

## 2022-10-01 ASSESSMENT — PAIN SCALES - GENERAL
PAINLEVEL_OUTOF10: 10
PAINLEVEL_OUTOF10: 10

## 2022-10-01 NOTE — PROGRESS NOTES
Date: 9/30/2022    Time: 09:50 PM    Patient Placed On BIPAP/CPAP/ Non-Invasive Ventilation? Yes    If no must comment. Facial area red/color change? No           If YES are Blister/Lesion present? No   If yes must notify nursing staff  BIPAP/CPAP skin barrier?   Yes    Skin barrier type:mepilexlite       Comments:        Rupinder Cerna RCP

## 2022-10-01 NOTE — PLAN OF CARE
Problem: Chronic Conditions and Co-morbidities  Goal: Patient's chronic conditions and co-morbidity symptoms are monitored and maintained or improved  10/1/2022 0936 by Billie White RN  Outcome: Progressing  10/1/2022 0244 by Lay Ramos RN  Outcome: Progressing     Problem: Discharge Planning  Goal: Discharge to home or other facility with appropriate resources  10/1/2022 0936 by Billie White RN  Outcome: Johanna Locker  10/1/2022 0244 by Lay Ramos RN  Outcome: Progressing     Problem: Pain  Goal: Verbalizes/displays adequate comfort level or baseline comfort level  10/1/2022 0936 by Billie White RN  Outcome: Progressing  10/1/2022 0244 by Lay Ramos RN  Outcome: Progressing     Problem: Skin/Tissue Integrity  Goal: Absence of new skin breakdown  Description: 1. Monitor for areas of redness and/or skin breakdown  2. Assess vascular access sites hourly  3. Every 4-6 hours minimum:  Change oxygen saturation probe site  4. Every 4-6 hours:  If on nasal continuous positive airway pressure, respiratory therapy assess nares and determine need for appliance change or resting period.   10/1/2022 0936 by Billie White RN  Outcome: Progressing  10/1/2022 0244 by Lay Ramos RN  Outcome: Progressing     Problem: ABCDS Injury Assessment  Goal: Absence of physical injury  10/1/2022 0936 by Billie White RN  Outcome: Progressing  10/1/2022 0244 by Lay Ramos RN  Outcome: Progressing     Problem: Safety - Adult  Goal: Free from fall injury  10/1/2022 0936 by Billie White RN  Outcome: Progressing  10/1/2022 0244 by Lay Ramos RN  Outcome: Progressing     Problem: Cardiovascular - Adult  Goal: Maintains optimal cardiac output and hemodynamic stability  10/1/2022 0936 by Billie White RN  Outcome: Progressing  10/1/2022 0244 by Lay Ramos RN  Outcome: Progressing     Problem: Metabolic/Fluid and Electrolytes - Adult  Goal: Hemodynamic stability and optimal renal function maintained  10/1/2022 2209 by Daphnie Ha RN  Outcome: Progressing  10/1/2022 0244 by Nikki Guadalupe RN  Outcome: Progressing

## 2022-10-01 NOTE — PATIENT CARE CONFERENCE
[facility] Quality Flow/Interdisciplinary Rounds Progress Note        Quality Flow Rounds held on October 1, 2022    Disciplines Attending:  Bedside Nurse, , , and Nursing Unit Leadership    Kati Otoole was admitted on 9/28/2022  5:04 AM    Anticipated Discharge Date:  Expected Discharge Date: 09/30/22    Disposition:    Blas Score:  Blas Scale Score: 19    Readmission Risk              Risk of Unplanned Readmission:  38           Discussed patient goal for the day, patient clinical progression, and barriers to discharge.   The following Goal(s) of the Day/Commitment(s) have been identified:  Labs - Report Results      Eliezer Dinh RN  October 1, 2022

## 2022-10-01 NOTE — PROGRESS NOTES
[address] [facility] Attending    S: 73 y.o. female with  a history of afib (off of anticoagulation due to recent gi bleeding), HFpEF, chronic hypoxic respiratory failure on 6L NC, COPD, DI, remote c diff, htn, hypothyroidism, VAIN III, ischemic colitis, severe pulmonary hypertension, sarcoidosis, ventral hernia, fibromyalgia, avn on chronic opioid therapy who presented for hypoxia. Patient was admitted with gi bleed and uti and discharged on 9/27 to SNF. There found to sat 81% on NC and required NRB (trouble with bipap setup) so sent to ER. Is on lasix for leg edema as well. Pt given lasix, duonebs and bipap in ER with improvement of acid/base disorders on abg. Today, reports that she continues to improve, approaching chronic baseline, but feels drowsy this AM.      O: VS- Blood pressure 120/89, pulse 88, temperature 98.8 °F (37.1 °C), temperature source Temporal, resp. rate 18, height 5' (1.524 m), weight 178 lb 9.6 oz (81 kg), SpO2 97 %, not currently breastfeeding. Exam is as noted by resident with the following changes, additions or corrections:  Gen: NAD, sleeping with AVAPS in place, awakens easily to voice  HEENT: NCAT  CV-RRR   Lungs-Coarse bs b/l  Ext-no C/C; tr edema b/l      Impressions:   Principal Problem:    Acute respiratory failure with hypoxia ([address]  Resolved Problems:    * No resolved hospital problems. *      Plan:   Pulm recommendations appreciated. Wean oxygen as able to maintain sats 88-92%. AVAPS. Continue chronic prednisone therapy. Lasix 40 IV (cxr with increased pulmonary vascularity) for hfpef, consider resuming home dose of every other day dosing. I and Os. Clinically improving    Bp control. Urine output ok; follow. DDAVP for hx of DI. Hold anticoagulants for now. Watch H&H. Close follow up with PCP and cardiology outpatient to discuss anticoagulation and other options. Precert for [employer].      Attending Physician Statement  I have reviewed the chart and seen the patient with the resident(s). I personally reviewed images, EKG's and similar tests, if present. I personally reviewed and performed key elements of the history and exam.  I have reviewed and confirmed student and/or resident history and exam with changes as indicated above. I agree with the assessment, plan and orders as documented by the resident. Please refer to the resident and/or student note for additional information.       Carlos Flores, DO

## 2022-10-01 NOTE — DISCHARGE INSTRUCTIONS
=====================================  [facility], [address]  =====================================    Take your medications as directed in this summary    Future scheduled appointments are listed below or recommended appointments are mentioned above. Future Appointments   Date Time Provider Mauri Sanford   10/4/2022  3:20 PM MD Mercedes Langford [facility]   10/24/2022  1:45 PM FRIDA Borges CNP [facility] INF       Call [unfilled] to confirm or schedule your appointment with Dr. Esdras Cm,  as soon as possible and/or if there are any appointment changes or other issues. It is important that you follow up with  @CUE@ for better monitoring of the reason of your hospitalization. Follow up with the specialists that saw you during your stay as well. If you have any questions call your PCP at [phone]. Once discharged from [facility], you can :     Return to work : Yes, you may return to work  Activity : As tolerated  Stairs : As tolerated  Exercise : As tolerated  Lifting : As tolerated   Sexual activity : Yes  Driving : With seat belt on. NO driving on narcotic pain medication if prescribed   Medications : Always take your medications as prescribed  Wound Care: none needed  Diet : You are asked to make an attempt to improve diet and exercise patterns to aid in medical management of your medical condition/problem. Call [facility] with any further questions. Return to Emergency Department with any worsening of your condition and/or fever greater than 101 degrees, new weakness, shortness of breath or chest pain.

## 2022-10-01 NOTE — DISCHARGE INSTR - COC
Continuity of Care Form    Patient Name: Alexandra Gunderson   :  1955  MRN:  [id number]    Admit date:  2022  Discharge date:  10/02/2022      Code Status Order: Full Code   Advance Directives:     Admitting Physician:  Светлана Brady MD  PCP: Emy Andrea MD    Discharging Nurse: Cyndie Moss Unit/Room#: 9508/8295-A  Discharging Unit Phone Number: [phone]      Emergency Contact:   Extended Emergency Contact Information  Primary Emergency Contact: [facility]  Address: [address] [place] [address] Phone: [phone]  Mobile Phone: [phone]  Relation: Spouse    Past Surgical History:  Past Surgical History:   Procedure Laterality Date    ABDOMEN SURGERY      ischemic colitis    COLONOSCOPY      healed colitis    COLONOSCOPY  2014    COLONOSCOPY  2015    severe colitis    COLONOSCOPY  10/24/2016    COLONOSCOPY  2017    ECHO COMPL W DOP COLOR FLOW  2015         ECHO COMPLETE  2013         FRACTURE SURGERY  2010    Tibial right    HEMORRHOID SURGERY  2016    KYPHOSIS SURGERY      For comp.  fx T10    LUMBAR DISC SURGERY      OTHER SURGICAL HISTORY  11    removal r leg external fixator    OTHER SURGICAL HISTORY  2021    Partial Vaginectomy, Endometrial Biopsy    SIGMOIDOSCOPY N/A 3/19/2021    SIGMOIDOSCOPY HEMORRHOID BANDING performed by Lanna Dandy, MD at [address] [place] [address]      application TSF      UPPER GASTROINTESTINAL ENDOSCOPY  2016    UPPER GASTROINTESTINAL ENDOSCOPY  2017       Immunization History:   Immunization History   Administered Date(s) Administered    COVID-19, MODERNA BLUE border, Primary or Immunocompromised, (age 12y+), IM, 100 mcg/0.5mL 2021, 2021, 2022    INFLUENZA, INTRADERMAL, QUADRIVALENT, PRESERVATIVE FREE 10/03/2019    Influenza 10/26/2011    Influenza A (Y2F6-58) Vaccine PF IM 11/04/2009    Influenza Vaccine, unspecified formulation 10/26/2011, 12/11/2013, 09/26/2016, 09/24/2018    Influenza Virus Vaccine 10/01/2010, 09/01/2012, 12/11/2013, 11/27/2015, 10/12/2020    Influenza, AFLURIA (age 1 yrs+), FLUZONE, (age 10 mo+), MDV, 0.5mL 09/26/2016    Influenza, FLUARIX, FLULAVAL, FLUZONE (age 10 mo+) AND AFLURIA, (age 1 y+), PF, 0.5mL 09/24/2018, 10/28/2019, 10/12/2020, 11/04/2021    Influenza, FLUCELVAX, (age 10 mo+), MDCK, MDV, 0.5mL 09/25/2017    Influenza, Intradermal, Preservative free 11/05/2014    MMR 05/13/2019    Pneumococcal Conjugate Vaccine 10/06/2015    Pneumococcal Polysaccharide ([id number]) 06/02/2010, 01/03/2017, 05/19/2022    Tdap (Boostrix, Adacel) 06/02/2010    Zoster Recombinant (Shingrix) 10/12/2020, 10/12/2020, 08/03/2021       Active Problems:  Patient Active Problem List   Diagnosis Code    Chronic back pain M54.9, G89.29    Primary hypothyroidism E03.9    Nephrosclerosis I12.9    Diabetes insipidus, neurohypophyseal ([facility]) E23.2    Sarcoidosis D86.9    Steroid-induced avascular necrosis of shoulder ([facility] M87.119, T38.0X5A    Anemia due to chronic illness D63.8    Chronic pain syndrome G89.4    Bilateral hip pain M25.551, M25.552    Debility R53.81    BRBPR (bright red blood per rectum) K62.5    Hypertensive pulmonary vascular disease I27.20    Benign essential hypertension I10    Chronic kidney disease, stage III (moderate) ([facility]) N18.30    PAF (paroxysmal atrial fibrillation) ([facility]) currently in NSR I48.0    Mixed hyperlipidemia E78.2    Chronic combined systolic and diastolic heart failure ([facility]) I50.42    Chronic respiratory failure with hypoxia ([facility]) J96.11    Mixed simple and mucopurulent chronic bronchitis (HCC) J41.8    Recurrent major depressive disorder, in partial remission ([facility]) F33.41    Gait disturbance R26.9    Chronic, continuous use of opioids F11.90    PMB (postmenopausal bleeding) N95.0    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    Epistaxis R04.0    Ventral hernia without obstruction or gangrene K43.9    Long term (current) use of systemic steroids Z79.52    Confusion R41.0    Acute combined systolic and diastolic CHF, NYHA class 1 ([facility]) I50.41    Bilateral pleural effusion J90    Nuclear senile cataract H25.10    UTI (urinary tract infection) N39.0    Acute respiratory failure with hypoxia ([facility]) J96.01       Isolation/Infection:   Isolation            No Isolation          Patient Infection Status       Infection Onset Added Last Indicated Last Indicated By Review Planned Expiration Resolved Resolved By    None active    Resolved    COVID-19 (Rule Out) 09/27/22 09/27/22 09/27/22 COVID-19, Rapid (Ordered)   09/27/22 Rule-Out Test Resulted    C-diff Rule Out 09/21/22 09/21/22 09/21/22 CLOSTRIDIUM DIFFICILE EIA (Ordered)   09/22/22 Aurelia Thorne    This Order Has Been Canceled    Order Status Reason By On  Canceled None Jovi Pereira RN 9/22/22 0735        COVID-19 (Rule Out) 07/15/22 07/15/22 07/15/22 Respiratory Panel, Molecular, with COVID-19 (Restricted: peds pts or suitable admitted adults) (Ordered)   07/15/22 Rule-Out Test Resulted    COVID-19 (Rule Out) 07/14/22 07/14/22 07/14/22 COVID-19, Rapid (Ordered)   07/14/22 Rule-Out Test Resulted    COVID-19 (Rule Out) 07/10/22 07/10/22 07/10/22 COVID-19, Rapid (Ordered)   07/10/22 Rule-Out Test Resulted    COVID-19 (Rule Out) 02/19/22 02/19/22 02/19/22 COVID-19, Rapid (Ordered)   02/19/22 Rule-Out Test Resulted    COVID-19 (Rule Out) 03/15/21 03/15/21 03/15/21 Covid-19 Ambulatory (Ordered)   03/18/21 Rule-Out Test Resulted            Nurse Assessment:  Last Vital Signs: /89   Pulse 68   Temp 98.8 °F (37.1 °C) (Temporal)   Resp 12   Ht 5' (1.524 m)   Wt 178 lb 9.6 oz (81 kg)   LMP  (LMP Unknown)   SpO2 99%   BMI 34.88 kg/m²     Last documented pain score (0-10 scale): Pain Level: 10  Last Weight:   Wt Readings from Last 1 Encounters:   10/01/22 178 lb 9.6 oz (81 kg)     Mental Status:  oriented and alert    IV Access:  - None    Nursing Mobility/ADLs:  Walking   Assisted  Transfer  Assisted  Bathing  Assisted  Dressing  Assisted  Toileting  Assisted  Feeding  Independent  Med Admin  Assisted  Med Delivery   whole    Wound Care Documentation and Therapy:  Wound 07/10/22 Abdomen Medial (Active)   Wound Image   09/15/22 1333   Wound Etiology Non-Healing Surgical 09/26/22 1739   Dressing Status Clean;Dry; Intact 10/01/22 0800   Wound Cleansed Cleansed with saline 09/30/22 1848   Dressing/Treatment ABD; Alginate with Ag;Dry dressing 09/30/22 1848   Dressing Change Due 10/01/22 09/30/22 1848   Wound Length (cm) 10 cm 09/28/22 1730   Wound Width (cm) 4 cm 09/28/22 1730   Wound Depth (cm) 0.3 cm 09/28/22 1730   Wound Surface Area (cm^2) 40 cm^2 09/28/22 1730   Change in Wound Size % (l*w) 0 09/28/22 1730   Wound Volume (cm^3) 12 cm^3 09/28/22 1730   Wound Healing % 0 09/28/22 1730   Wound Assessment Pink/red 09/28/22 1730   Drainage Amount Small 09/28/22 1730   Drainage Description Yellow 09/29/22 1811   Odor None 09/29/22 1811   Kaitlyn-wound Assessment Dry/flaky 09/26/22 1739   Number of days: 82       Wound 09/22/22 Buttocks Left; Inner (Active)   Dressing Status Intact 09/27/22 0949   Wound Cleansed Soap and water 09/23/22 2123   Dressing/Treatment Open to air 10/01/22 0800   Wound Length (cm) 0.5 cm 09/28/22 1730   Wound Width (cm) 0.5 cm 09/28/22 1730   Wound Depth (cm) 0.1 cm 09/28/22 1730   Wound Surface Area (cm^2) 0.25 cm^2 09/28/22 1730   Wound Volume (cm^3) 0.025 cm^3 09/28/22 1730   Wound Assessment Dry;Superficial 09/29/22 1406   Drainage Amount None 10/01/22 0800   Odor None 09/29/22 1406   Number of days: 9        Elimination:  Continence:    Bowel: Yes  Bladder: Yes  Urinary Catheter: None and Removal Date 10/02/2022    Colostomy/Ileostomy/Ileal Conduit: No       Date of Last BM: 10/02/2022      Intake/Output Summary (Last 24 hours) at 10/1/2022 1403  Last data filed at 10/1/2022 1051  Gross per 24 hour Intake 360 ml   Output 1450 ml   Net -1090 ml     I/O last 3 completed shifts: In: 5 [P.O.:420]  Out: 1100 [Urine:1100]    Safety Concerns:     None    Impairments/Disabilities:      None    Nutrition Therapy:  Current Nutrition Therapy:   - Oral Diet:  Cardiac    Routes of Feeding: Oral  Liquids: Thin Liquids  Daily Fluid Restriction: no  Last Modified Barium Swallow with Video (Video Swallowing Test): not done    Treatments at the Time of Hospital Discharge:   Respiratory Treatments:     Oxygen Therapy:  {Therapy; copd oxygen:42746}  Ventilator:    {[facility] Vent NJGK:002630660}    Rehab Therapies: {THERAPEUTIC INTERVENTION:0562823861}  Weight Bearing Status/Restrictions: { CC Weight Bearin}  Other Medical Equipment (for information only, NOT a DME order):  {EQUIPMENT:058451761}  Other Treatments: ***    Patient's personal belongings (please select all that are sent with patient):  {CHP DME Belongings:057184838}    RN SIGNATURE:  {Esignature:485863513}    CASE MANAGEMENT/SOCIAL WORK SECTION    Inpatient Status Date: ***    Readmission Risk Assessment Score:  Readmission Risk              Risk of Unplanned Readmission:  38           Discharging to Facility/ Agency   Name:   Address:  Phone:  Fax:    Dialysis Facility (if applicable)   Name:  Address:  Dialysis Schedule:  Phone:  Fax:    / signature: {Esignature:094844934}    PHYSICIAN SECTION    Prognosis: Good    Condition at Discharge: Stable    Rehab Potential (if transferring to Rehab): Good    Recommended Labs or Other Treatments After Discharge:   Adjust Lasix dosing PRN for fluid overload. Discharged with 40 MG every day  Needs follow up with Dr. Mcfarlane Anis Oct 4  Adjust bowel regimen PRN for constipation, may consider holding or discontinuing iron supplementation to offset constipation.      Physician Certification: I certify the above information and [facility]  is necessary for the continuing treatment of the diagnosis listed and that she requires [place] for greater 30 days.      Update Admission H&P: No change in H&P    PHYSICIAN SIGNATURE:  Electronically signed by Nancy Marlow MD on 10/1/22 at 2:04 PM EDT

## 2022-10-01 NOTE — DISCHARGE SUMMARY
Discharge Summary    Clair Navarrete  :  1955  MRN:  [id number]    Admit date:  2022  Discharge date:  10/4/2022    Admitting Physician:  Yamile Kennedy MD    Discharge Diagnoses:    Patient Active Problem List   Diagnosis    Chronic back pain    Primary hypothyroidism    Nephrosclerosis    Diabetes insipidus, neurohypophyseal ([address]    Sarcoidosis    Steroid-induced avascular necrosis of shoulder ([address]    Anemia due to chronic illness    Chronic pain syndrome    Bilateral hip pain    Debility    BRBPR (bright red blood per rectum)    Hypertensive pulmonary vascular disease    Benign essential hypertension    Chronic kidney disease, stage III (moderate) (H[facility])    PAF (paroxysmal atrial fibrillation) (HCC) currently in NSR    Mixed hyperlipidemia    Chronic combined systolic and diastolic heart failure (HCC)    Chronic respiratory failure with hypoxia (HCC)    Mixed simple and mucopurulent chronic bronchitis (HCC)    Recurrent major depressive disorder, in partial remission (HCC)    Gait disturbance    Chronic, continuous use of opioids    PMB (postmenopausal bleeding)    Severe dysplasia of vagina    Epistaxis    Ventral hernia without obstruction or gangrene    Long term (current) use of systemic steroids    Confusion    Acute combined systolic and diastolic CHF, NYHA class 1 (HCC)    Bilateral pleural effusion    Nuclear senile cataract    UTI (urinary tract infection)       Admission Condition:  stable    Discharged Condition:  good    Hospital Course:  Clair Navarrete is a 73 y.o. female with PMH of COPD, sarcoidosis, CHF, pulmonary hypertension, A. fib, chronic pain disorder, chronic opioid use presented to ED with SOB accompanied by chest tightness and increased sputum production. She states while she was at [facility] the staff did not know how to adjust her BiPAP settings.  Workup has revealed /134, ProBNP 3354, ABG Metabolic alkalosis w/compensated respiratory acidosis and CXR with patchy bilateral airspace opacities suggesting atelectasis or mild multi-lobar interstitial pneumonitis. Patient was admitted for acute hypoxic respiratory failure, treated with DuoNeb, Pulmicort, Brovana. Patient was placed on BiPAP at night and NC in the day. Overall oxygenation improved. O2 sat around % while on NC. Pulmonology were consulted and they recommended adding Lasix 40 mg IV, patient is then switched to PO Lasix. Patient remained stable, recovered and was in a suitable condition to be discharged to [facility] who confirmed they have a BiPAP on site. Discharge plan:   Discharged on home dose lasix 40mg every day. Needs close f/u and adjustment appropriately. If missing appointment would recommend touching base with facility for further management and to avoid readmission. Consider getting repeat CBC to monitor hgb. Also with significant constipation, in hospital held IRON 325 BID. Increase bowel regimen as indicated. Consider changing iron to every other day. F/U with Nephro as soon as able. Outpatient follow up with Cardio for consideration of anticoagulation vs other given Afib and history of GIB. Continue taking Toprol XL and Cardizem.       Discharge Medications:         Medication List        START taking these medications      docusate 100 MG Caps  Commonly known as: COLACE, DULCOLAX  Take 100 mg by mouth daily            CHANGE how you take these medications      furosemide 40 MG tablet  Commonly known as: LASIX  Take 1 tablet by mouth daily  What changed: when to take this            CONTINUE taking these medications      * albuterol (2.5 MG/3ML) 0.083% nebulizer solution  Commonly known as: PROVENTIL  Take 3 mLs by nebulization every 6 hours as needed for Wheezing or Shortness of Breath     * albuterol sulfate  (90 Base) MCG/ACT inhaler  Commonly known as: Proventil HFA  Inhale 2 puffs into the lungs every 6 hours as needed for Wheezing or Shortness of Breath     Breo Ellipta 100-25 MCG/INH Aepb inhaler  Generic drug: fluticasone-vilanterol  Inhale 1 puff into the lungs daily     cycloSPORINE 0.05 % ophthalmic emulsion  Commonly known as: Restasis  Place 1 drop into both eyes every 12 hours     desmopressin 0.1 MG tablet  Commonly known as: DDAVP  Take 2 tablets by mouth 2 times daily     dilTIAZem 30 MG tablet  Commonly known as: CARDIZEM  Take 1 tablet by mouth in the morning and 1 tablet at noon and 1 tablet before bedtime. escitalopram 10 MG tablet  Commonly known as: LEXAPRO  Take 1 tablet by mouth daily     famotidine 20 MG tablet  Commonly known as: PEPCID  Take 1 tablet by mouth daily     ferrous sulfate 325 (65 Fe) MG tablet  Commonly known as: IRON 325  Take 1 tablet by mouth 2 times daily     levothyroxine 75 MCG tablet  Commonly known as: SYNTHROID  Take 1 tablet by mouth daily     * metoprolol succinate 100 MG extended release tablet  Commonly known as: TOPROL XL     * metoprolol succinate 50 MG extended release tablet  Commonly known as: TOPROL XL     omega-3 acid ethyl esters 1 g capsule  Commonly known as: LOVAZA  Take 1 capsule by mouth in the morning and 1 capsule before bedtime. OXYGEN  Inhale 2 L/min into the lungs as needed (shortness of breath, low oxygen <90%) BMS     predniSONE 5 MG tablet  Commonly known as: DELTASONE  Take 1 tablet by mouth daily     promethazine-codeine 6.25-10 MG/5ML syrup  Commonly known as: PHENERGAN with CODEINE  Take 5 mLs by mouth 4 times daily as needed for Cough for up to 60 days. Venelex Oint ointment           * This list has 4 medication(s) that are the same as other medications prescribed for you. Read the directions carefully, and ask your doctor or other care provider to review them with you.                    Where to Get Your Medications        Information about where to get these medications is not yet available    Ask your nurse or doctor about these medications  docusate 100 MG Caps  furosemide 40 MG tablet         Consults: pulmonology, PT and OT    Significant Diagnostic Studies:  radiology: CXR: patchy infiltrated on bilateral airspaces on admission. Improvement in the left lobe on repeat CXR. Labs:  Na/K/Cl/CO2:  136/3.9/93/33 (10/04 0602)  BUN/Cr/glu/ALT/AST/amyl/lip:  16/1.0/--/15/22/--/-- (10/04 0602)  WBC/Hgb/Hct/Plts:  4.8/8.9/31.1/177 (10/03 4171)  estimated creatinine clearance is 51 mL/min (based on SCr of 1 mg/dL). New Imaging:  XR CHEST PORTABLE   Final Result   Bilateral airspace disease and or atelectasis with slightly improved aeration   on the left. XR CHEST PORTABLE   Final Result   Markings seen diffusely throughout the right lung slightly worsened in the   interval with slight worsening of the markings at the left lung base. XR CHEST PORTABLE   Final Result   Unchanged appearance from the day prior of shallow inspiration and patchy   bilateral airspace opacities suggesting atelectasis or mild multi lobar   pneumonitis. RECOMMENDATION:   Careful clinical correlation and follow up recommended. Treatments:   analgesia: on opioids chronically, added lidocaine patch and diclofenac gel, cardiac: Toprol XL 150mg, Cardizem 30mg TID, steroids: prednisone, and respiratory therapy: O2, albuterol/atropine nebulizer, DuoNeb, Pulmicort, Brovana and BiPAP.     Discharge Exam:  General Appearance: alert and oriented to person, place and time, obese, in no acute distress  Skin: warm and dry, no rash or erythema  Head: normocephalic and atraumatic  Eyes: pupils equal, round, and reactive to light, extraocular eye movements intact, conjunctivae normal  ENT: tympanic membrane, external ear and ear canal normal bilaterally, nose without deformity, nasal mucosa and turbinates normal without polyps  Neck: supple and non-tender without mass, no thyromegaly or thyroid nodules, no cervical lymphadenopathy  Pulmonary/Chest: diminished breath sounds bilaterally- no wheezes, rales or rhonchi, normal air movement, no respiratory distress  Cardiovascular: normal rate, irregular rhythm, distal pulses intact  Abdomen: soft, non-tender, non-distended, normal bowel sounds, no masses or organomegaly  Extremities: no cyanosis, clubbing or edema  Musculoskeletal: normal range of motion, no joint swelling, deformity or tenderness  Neurologic: reflexes normal and symmetric, no cranial nerve deficit, gait, coordination and speech normal    Disposition:   long term care facility    Future Appointments   Date Time Provider Mauri Claribel   10/4/2022  3:20 PM MD Jerald Coulter [facility]   10/24/2022  1:45 PM Cass Snyder, APRN - CNP [facility]       More than 30 minutes was spent in preparation of this patient's discharge including, but not limited to, examination, preparation of documents, prescription preparation, counseling and coordination.     [facility] MD  10/4/2022, 2:27 PM

## 2022-10-01 NOTE — PROGRESS NOTES
[facility] Inpatient   Resident Progress Note    S:  Hospital day: 3   Brief Synopsis: Rian Lennox is a 73 y.o. female with a PMH of COPD, sarcoidosis, CHF, pulmonary hypertension, A. fib, chronic pain disorder, chronic opioid use, who presents to ED for Shortness of Breath. She is on 4L O2 chronically and intermittent BIPAP. Patient was just discharged from hospital yesterday and was sent to [facility]. She was in the hospital for rectal bleeding. She states that while she was at [facility] the staff did not know how to adjust her BiPAP settings. Due to this she became increasingly more short of breath as the day went on. The shortness of breath was accompanied by chest tightness and increased sputum production. She states that she asked for a breathing treatment but was given her inhaler to use x1. She did receive her Lasix dose yesterday. She states that since starting Lasix her lower extremity edema has been improved. Last exercise stress test from October 2016 demonstrated normal LV wall motion, myocardial thickening during systole and EF 76%. Last echo from 7/15/2022 showing EF of 60%, moderate left ventricular concentric hypertrophy, dilated right ventricle with reduced function and severe pulmonary hypertension. CT from 9/10/2022 demonstrating opacities in lung bases suggestive of subsegmental atelectasis and chronic interstitial lung disease. ED workup showed: /134, ProBNP 3311, ABG Metabolic alkalosis w/compensated respiratory acidosis and CXR with patchy bilateral airspace opacities suggesting atelectasis or mild multi-lobar interstitial pneumonitis. She was given Lasix 20mg IV and placed on bipap. Was admitted for acute hypoxic respiratory failure. Overnight/interim:   No events overnight. Patient seen and evaluated at bedside this morning. She was resting comfortably in bed with bipap in place saturation at 100%.  She has been wearing it mainly to sleep and has been on NC during the day. Denies chest pain, shortness of breath, nausea or vomiting. Anticipate discharge within 24 hours. Cont meds:    sodium chloride       Scheduled meds:    docusate sodium  100 mg Oral Daily    Vitamin D  1,000 Units Oral Daily    furosemide  40 mg IntraVENous Daily    desmopressin  200 mcg Oral BID    dilTIAZem  30 mg Oral TID    escitalopram  10 mg Oral Daily    famotidine  20 mg Oral Daily    [Held by provider] ferrous sulfate  325 mg Oral BID    [Held by provider] furosemide  40 mg Oral Once per day on Mon Wed Fri    levothyroxine  75 mcg Oral Daily    metoprolol succinate  100 mg Oral Daily    metoprolol succinate  50 mg Oral Daily    omega-3 acid ethyl esters  1 g Oral BID    predniSONE  5 mg Oral Daily    lidocaine  1 patch TransDERmal Daily    diclofenac sodium  4 g Topical BID    sodium chloride flush  5-40 mL IntraVENous 2 times per day    Arformoterol Tartrate  15 mcg Nebulization BID    budesonide  500 mcg Nebulization BID     PRN meds: Tears Again Advanced Eyelid, oxyCODONE-acetaminophen, sodium chloride flush, sodium chloride, polyethylene glycol, acetaminophen **OR** acetaminophen, promethazine **OR** ondansetron, albuterol     I reviewed the patient's past medical and surgical history, Medications and Allergies. O:  /89   Pulse 88   Temp 98.8 °F (37.1 °C) (Temporal)   Resp 18   Ht 5' (1.524 m)   Wt 178 lb 9.6 oz (81 kg)   LMP  (LMP Unknown)   SpO2 97%   BMI 34.88 kg/m²   24 hour I&O: I/O last 3 completed shifts: In: 5 [P.O.:420]  Out: 1100 [Urine:1100]  I/O this shift:  In: 180 [P.O.:180]  Out: 650 [Urine:650]        Physical Exam  Vitals reviewed. Constitutional:       General: She is not in acute distress. Appearance: She is obese. Interventions: Face mask in place. Cardiovascular:      Rate and Rhythm: Rhythm irregular. Pulmonary:      Breath sounds: Decreased air movement present. Abdominal:      Palpations: Abdomen is soft.       Tenderness: There is no abdominal tenderness. Comments: Gauze overlying repaired ventral hernia    Genitourinary:     Comments: Sanchez in place   Musculoskeletal:      Right lower le+ Edema present. Left lower le+ Edema present. Right foot: No swelling. Left foot: Deformity present. No swelling. Psychiatric:         Behavior: Behavior is cooperative. Labs:  Na/K/Cl/CO2:  138/3.6/92/38 (10/01 0424)  BUN/Cr/glu/ALT/AST/amyl/lip:  14/0.9/--/18/19/--/-- (10/01 0424)  WBC/Hgb/Hct/Plts:  4.8/8.6/28.9/161 (10/01 0424)  estimated creatinine clearance is 59 mL/min (based on SCr of 0.9 mg/dL). Other pertinent labs as noted below    Radiology:  XR CHEST PORTABLE   Final Result   Bilateral airspace disease and or atelectasis with slightly improved aeration   on the left. XR CHEST PORTABLE   Final Result   Markings seen diffusely throughout the right lung slightly worsened in the   interval with slight worsening of the markings at the left lung base. XR CHEST PORTABLE   Final Result   Unchanged appearance from the day prior of shallow inspiration and patchy   bilateral airspace opacities suggesting atelectasis or mild multi lobar   pneumonitis. RECOMMENDATION:   Careful clinical correlation and follow up recommended.                Resident Assessment and Plan   Acute Hypoxic Respiratory Failure  -Patient with Sarcoid, COPD, CHF  -DUONEB, Pulmicort, Brovana  -Continue to wean Bipap  -HOLD home dose of Lasix 40mg 3 times weekly (Patient received dose of 20mg in ED today and received 40mg yesterday) - Continue to evaluate for need  -Patient will need placement to a facility that can manage her bipap settings   -ABG: Metabolic Alkalosis with compensated respiratory acidosis  -CXR on day of admission: Patchy bilateral airspace opacities suggesting atelectasis or mild multi-lobar pneumonitis   -CXR 22: Markings seen diffusely throughout the right lung slightly worsened in the interval with slight worsening of the markings at the left lung base. -CXR 22: Slightly improved on left. -Pulmonology following: Added 40mg IV lasix. May consider discontinuing pending creatinine.   -Awaiting cultures   -Flutter valve   -Vitamin D level insufficient: Add supplement   -Procalcitonin: 0.11 (0.09 on last admission)  -UDS+ for oxycodone  -Will need sleep study outpatient   -Telemetry  -Strict I/Os: Output 0.2cc/kg/hr (Decreased output yesterday)  -Add compression stockings      Decreased Urine Output  -0.2cc/kg/hr on 22  -May consider renal ultrasound or nephrology consult pending output today     Sarcoidosis  -Continue on Prednisone 5mg daily     Elevated troponin  -Downtrending  -Will not repeat at this time  -Telemetry    Atrial Fibrillation  -Continue Toprol XL (100+50mg)  -Continue on Cardizem 30mg TID   -Patient has been on Eliquis in the past but no longer on it. Discontinue for now due to recent GI bleed     Chronic Pain  -Patient on opioids chronically. -Need to discuss pain management plan. Per PDMP patient's opioid use has decreased   -Add lidocaine patch and Diclofenac gel   -UDS+ for oxycodone     Diabetes Insipidus  -Likely secondary to Sarcoidosis  -Continue home dose DDAVP     Chronic Anemia  -Hgb downtrendin.0 today   -Likely due to chronic disease  -Continue on ferrous sulfate 325 BID      Hypothyroidism  -Continue home Levothyroxine 75     Depression  -Continue home Lexapro 10mg      GERD  -Continue home Pepcid     PT/OT: Consult  GI: Pepcid  DVT prophylaxis: PCDs due to recent GI bleed   Dispo:  Admit while awaiting placement   Diet: Low sodium        Electronically signed by Carmen Pompa MD on 10/1/2022 at 11:07 AM  Attending physician: Dr. Shaquille Asher

## 2022-10-02 LAB
ALBUMIN SERPL-MCNC: 3 G/DL (ref 3.5–5.2)
ALP BLD-CCNC: 87 U/L (ref 35–104)
ALT SERPL-CCNC: 14 U/L (ref 0–32)
ANION GAP SERPL CALCULATED.3IONS-SCNC: 8 MMOL/L (ref 7–16)
ANISOCYTOSIS: ABNORMAL
AST SERPL-CCNC: 16 U/L (ref 0–31)
BASOPHILS ABSOLUTE: 0.02 E9/L (ref 0–0.2)
BASOPHILS RELATIVE PERCENT: 0.5 % (ref 0–2)
BILIRUB SERPL-MCNC: 0.5 MG/DL (ref 0–1.2)
BUN BLDV-MCNC: 14 MG/DL (ref 6–23)
CALCIUM SERPL-MCNC: 8.6 MG/DL (ref 8.6–10.2)
CHLORIDE BLD-SCNC: 94 MMOL/L (ref 98–107)
CO2: 39 MMOL/L (ref 22–29)
CREAT SERPL-MCNC: 0.9 MG/DL (ref 0.5–1)
EOSINOPHILS ABSOLUTE: 0.26 E9/L (ref 0.05–0.5)
EOSINOPHILS RELATIVE PERCENT: 5.9 % (ref 0–6)
GFR AFRICAN AMERICAN: >60
GFR NON-AFRICAN AMERICAN: >60 ML/MIN/1.73
GLUCOSE BLD-MCNC: 72 MG/DL (ref 74–99)
HCT VFR BLD CALC: 29.1 % (ref 34–48)
HEMOGLOBIN: 8.7 G/DL (ref 11.5–15.5)
HYPOCHROMIA: ABNORMAL
IMMATURE GRANULOCYTES #: 0.01 E9/L
IMMATURE GRANULOCYTES %: 0.2 % (ref 0–5)
LYMPHOCYTES ABSOLUTE: 0.48 E9/L (ref 1.5–4)
LYMPHOCYTES RELATIVE PERCENT: 10.9 % (ref 20–42)
MCH RBC QN AUTO: 25.6 PG (ref 26–35)
MCHC RBC AUTO-ENTMCNC: 29.9 % (ref 32–34.5)
MCV RBC AUTO: 85.6 FL (ref 80–99.9)
MONOCYTES ABSOLUTE: 0.47 E9/L (ref 0.1–0.95)
MONOCYTES RELATIVE PERCENT: 10.6 % (ref 2–12)
NEUTROPHILS ABSOLUTE: 3.18 E9/L (ref 1.8–7.3)
NEUTROPHILS RELATIVE PERCENT: 71.9 % (ref 43–80)
OVALOCYTES: ABNORMAL
PDW BLD-RTO: 16.5 FL (ref 11.5–15)
PLATELET # BLD: 171 E9/L (ref 130–450)
PMV BLD AUTO: 11.5 FL (ref 7–12)
POIKILOCYTES: ABNORMAL
POLYCHROMASIA: ABNORMAL
POTASSIUM SERPL-SCNC: 3.4 MMOL/L (ref 3.5–5)
RBC # BLD: 3.4 E12/L (ref 3.5–5.5)
SARS-COV-2, NAAT: NOT DETECTED
SCHISTOCYTES: ABNORMAL
SODIUM BLD-SCNC: 141 MMOL/L (ref 132–146)
TEAR DROP CELLS: ABNORMAL
TOTAL PROTEIN: 5.7 G/DL (ref 6.4–8.3)
WBC # BLD: 4.4 E9/L (ref 4.5–11.5)

## 2022-10-02 PROCEDURE — 94640 AIRWAY INHALATION TREATMENT: CPT

## 2022-10-02 PROCEDURE — 99232 SBSQ HOSP IP/OBS MODERATE 35: CPT | Performed by: FAMILY MEDICINE

## 2022-10-02 PROCEDURE — 97535 SELF CARE MNGMENT TRAINING: CPT

## 2022-10-02 PROCEDURE — 36415 COLL VENOUS BLD VENIPUNCTURE: CPT

## 2022-10-02 PROCEDURE — 6360000002 HC RX W HCPCS

## 2022-10-02 PROCEDURE — 6370000000 HC RX 637 (ALT 250 FOR IP): Performed by: FAMILY MEDICINE

## 2022-10-02 PROCEDURE — 87635 SARS-COV-2 COVID-19 AMP PRB: CPT

## 2022-10-02 PROCEDURE — 80053 COMPREHEN METABOLIC PANEL: CPT

## 2022-10-02 PROCEDURE — 2140000000 HC CCU INTERMEDIATE R&B

## 2022-10-02 PROCEDURE — 94660 CPAP INITIATION&MGMT: CPT

## 2022-10-02 PROCEDURE — 85025 COMPLETE CBC W/AUTO DIFF WBC: CPT

## 2022-10-02 PROCEDURE — 6370000000 HC RX 637 (ALT 250 FOR IP)

## 2022-10-02 PROCEDURE — 97530 THERAPEUTIC ACTIVITIES: CPT

## 2022-10-02 PROCEDURE — 2580000003 HC RX 258

## 2022-10-02 PROCEDURE — 2700000000 HC OXYGEN THERAPY PER DAY

## 2022-10-02 RX ORDER — POTASSIUM CHLORIDE 20 MEQ/1
40 TABLET, EXTENDED RELEASE ORAL ONCE
Status: COMPLETED | OUTPATIENT
Start: 2022-10-02 | End: 2022-10-02

## 2022-10-02 RX ADMIN — DICLOFENAC SODIUM 4 G: 10 GEL TOPICAL at 20:57

## 2022-10-02 RX ADMIN — DICLOFENAC SODIUM 4 G: 10 GEL TOPICAL at 09:00

## 2022-10-02 RX ADMIN — SODIUM CHLORIDE, PRESERVATIVE FREE 10 ML: 5 INJECTION INTRAVENOUS at 20:57

## 2022-10-02 RX ADMIN — ARFORMOTEROL TARTRATE 15 MCG: 15 SOLUTION RESPIRATORY (INHALATION) at 19:58

## 2022-10-02 RX ADMIN — OXYCODONE AND ACETAMINOPHEN 1 TABLET: 5; 325 TABLET ORAL at 16:14

## 2022-10-02 RX ADMIN — OXYCODONE AND ACETAMINOPHEN 1 TABLET: 5; 325 TABLET ORAL at 22:22

## 2022-10-02 RX ADMIN — PREDNISONE 5 MG: 5 TABLET ORAL at 10:07

## 2022-10-02 RX ADMIN — ESCITALOPRAM OXALATE 10 MG: 10 TABLET ORAL at 10:07

## 2022-10-02 RX ADMIN — DESMOPRESSIN ACETATE 200 MCG: 0.1 TABLET ORAL at 10:08

## 2022-10-02 RX ADMIN — Medication 1000 UNITS: at 10:07

## 2022-10-02 RX ADMIN — FAMOTIDINE 20 MG: 20 TABLET, FILM COATED ORAL at 10:09

## 2022-10-02 RX ADMIN — METOPROLOL SUCCINATE 50 MG: 25 TABLET, EXTENDED RELEASE ORAL at 10:09

## 2022-10-02 RX ADMIN — DILTIAZEM HYDROCHLORIDE 30 MG: 30 TABLET, FILM COATED ORAL at 14:46

## 2022-10-02 RX ADMIN — OXYCODONE AND ACETAMINOPHEN 1 TABLET: 5; 325 TABLET ORAL at 10:05

## 2022-10-02 RX ADMIN — DILTIAZEM HYDROCHLORIDE 30 MG: 30 TABLET, FILM COATED ORAL at 10:07

## 2022-10-02 RX ADMIN — LEVOTHYROXINE SODIUM 75 MCG: 0.07 TABLET ORAL at 10:08

## 2022-10-02 RX ADMIN — FUROSEMIDE 40 MG: 10 INJECTION, SOLUTION INTRAMUSCULAR; INTRAVENOUS at 10:07

## 2022-10-02 RX ADMIN — DILTIAZEM HYDROCHLORIDE 30 MG: 30 TABLET, FILM COATED ORAL at 20:57

## 2022-10-02 RX ADMIN — OMEGA-3-ACID ETHYL ESTERS 1 G: 1 CAPSULE, LIQUID FILLED ORAL at 10:06

## 2022-10-02 RX ADMIN — ARFORMOTEROL TARTRATE 15 MCG: 15 SOLUTION RESPIRATORY (INHALATION) at 08:58

## 2022-10-02 RX ADMIN — SODIUM CHLORIDE, PRESERVATIVE FREE 10 ML: 5 INJECTION INTRAVENOUS at 10:22

## 2022-10-02 RX ADMIN — BUDESONIDE 500 MCG: 0.5 SUSPENSION RESPIRATORY (INHALATION) at 19:58

## 2022-10-02 RX ADMIN — POTASSIUM CHLORIDE 40 MEQ: 1500 TABLET, EXTENDED RELEASE ORAL at 10:20

## 2022-10-02 RX ADMIN — METOPROLOL SUCCINATE 100 MG: 100 TABLET, FILM COATED, EXTENDED RELEASE ORAL at 10:07

## 2022-10-02 RX ADMIN — DESMOPRESSIN ACETATE 200 MCG: 0.1 TABLET ORAL at 20:57

## 2022-10-02 RX ADMIN — DOCUSATE SODIUM 100 MG: 100 CAPSULE, LIQUID FILLED ORAL at 10:07

## 2022-10-02 RX ADMIN — BUDESONIDE 500 MCG: 0.5 SUSPENSION RESPIRATORY (INHALATION) at 08:58

## 2022-10-02 RX ADMIN — OMEGA-3-ACID ETHYL ESTERS 1 G: 1 CAPSULE, LIQUID FILLED ORAL at 20:57

## 2022-10-02 ASSESSMENT — PAIN DESCRIPTION - ONSET: ONSET: ON-GOING

## 2022-10-02 ASSESSMENT — PAIN SCALES - GENERAL
PAINLEVEL_OUTOF10: 0
PAINLEVEL_OUTOF10: 3
PAINLEVEL_OUTOF10: 8
PAINLEVEL_OUTOF10: 7
PAINLEVEL_OUTOF10: 0
PAINLEVEL_OUTOF10: 5
PAINLEVEL_OUTOF10: 0
PAINLEVEL_OUTOF10: 10
PAINLEVEL_OUTOF10: 5

## 2022-10-02 ASSESSMENT — PAIN DESCRIPTION - LOCATION
LOCATION: BACK

## 2022-10-02 ASSESSMENT — PAIN DESCRIPTION - DESCRIPTORS
DESCRIPTORS: ACHING
DESCRIPTORS: ACHING;DISCOMFORT;SORE
DESCRIPTORS: ACHING;DISCOMFORT;SORE

## 2022-10-02 ASSESSMENT — PAIN DESCRIPTION - PAIN TYPE: TYPE: ACUTE PAIN

## 2022-10-02 ASSESSMENT — PAIN DESCRIPTION - ORIENTATION
ORIENTATION: MID
ORIENTATION: MID

## 2022-10-02 ASSESSMENT — PAIN DESCRIPTION - FREQUENCY: FREQUENCY: CONTINUOUS

## 2022-10-02 ASSESSMENT — PAIN - FUNCTIONAL ASSESSMENT: PAIN_FUNCTIONAL_ASSESSMENT: PREVENTS OR INTERFERES SOME ACTIVE ACTIVITIES AND ADLS

## 2022-10-02 NOTE — PROGRESS NOTES
Physical Therapy  Physical Therapy Treatment    Name: Yanna Staton  : 1955  MRN: [id number]      Date of Service: 10/2/2022    Evaluating PT:  Lauren Escobedo PT [id number]    Referring provider/PT Order:  PT Eval and Treat   22  PT eval and treat      Wagner Weber MD     Room #:  7172/4152-X  Diagnosis:  Acute respiratory failure with hypoxia ([address] [J96.01]  Congestive heart failure, unspecified HF chronicity, unspecified heart failure type ([address] [I50.9]  PMHx/PSHx:     has a past medical history of A-fib ([address] Able to transfer from chair to wheelchair, Abnormal mammogram, Acute on chronic respiratory failure ([address] Anemia, Anemia due to chronic illness, Ankle fracture, left, 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[address] Backache, Benign hypertension, CHF (congestive heart failure) ([address] Chronic back pain, Chronic kidney disease, Chronic pain disorder, Chronic, continuous use of opioids, Compression fracture of thoracic vertebra ([address] COPD (chronic obstructive pulmonary disease) ([address] Debility, Deformity of ankle and foot, acquired, Depression, Diabetes insipidus ([address] Diverticulitis, Dry eyes, Encephalopathy acute, Gait disturbance, GERD (gastroesophageal reflux disease), Hemorrhoids, Hernia, History of blood transfusion, History of Clostridium difficile, Hyperlipidemia, Hypothyroidism, Ischemic colitis, enteritis, or enterocolitis ([address] Long term (current) use of systemic steroids, Long-term current use of steroids, Lumbar disc herniation, Myalgia and myositis, unspecified, Nephrosclerosis, Nonunion of fracture, Osteoarthritis, PAF (paroxysmal atrial fibrillation) ([address] 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[address] Pulmonary hypertension ([address] Pulmonary sarcoidosis ([address] Rectal bleeding, Rhabdomyolysis, Steroid-induced avascular necrosis of shoulder ([address] Tibia fracture, and Ventral hernia without obstruction or gangrene.     has a past surgical history that includes fracture surgery (); Kyphosis surgery; Lumbar disc surgery; Tibia / fibia lengthening; other surgical history (11/1/11); Abdomen surgery (2008); Echo Complete (4/22/2013); ECHO Compl W Dop Color Flow (8/16/2015); Upper gastrointestinal endoscopy (1/4/2016); Hemorrhoid surgery (12/08/2016); Colonoscopy (2008); Colonoscopy (04/11/2014); Colonoscopy (11/23/2015); Colonoscopy (10/24/2016); Colonoscopy (07/26/2017); Upper gastrointestinal endoscopy (07/26/2017); sigmoidoscopy (N/A, 3/19/2021); and other surgical history (12/14/2021). Procedure/Surgery:  none  Precautions:  Falls, O2 , FWB (full weight bearing) , L charcot foot, R knee varus deformity   Equipment Needs: To be determined,    SUBJECTIVE:    Pt lives with spouse in 1 floor home. Ramped entry. Equipment owned: power w/c, BSC, hospital bed, w/w, cane, home O2. Patient ambulated with wheeled walker short distances only. OBJECTIVE:   Initial Evaluation  Date: 9/29/22 Treatment  10/2/22 Short Term/ Long Term   Goals   AM-PAC 6 Clicks 38/96 07/18    Was pt agreeable to Eval/treatment? yes yes    Does pt have pain? None stated No c/o pain    Bed Mobility  Rolling: Min  Supine to sit:   Min   Sit to supine: Min   Scooting: Min  Rolling: Min  Supine to sit:   Min   Sit to supine: Min   Scooting: Min  Rolling: Ind  Supine to sit: Ind  Sit to supine: Ind  Scooting: Ind   Transfers Sit to stand: Mod  to fww  Stand to sit: Mod   Stand pivot: Mod  with fww Sit to stand: ModA from EOB, MaxA from chair  Stand to sit: ModA  Stand pivot: ModA  with fww Sit to stand: Mod Ind  to fww  Stand to sit: Mod Ind    Stand pivot: Mod Ind  with fww   Ambulation    Side steps only.   6 feet with [employer] Madi + chair follow 25 feet with Mod Ind     Stair negotiation: ascended and descended  NT NT TBD     Strength/ROM:      RLE grossly 3/5  LLE grossly 3/5  RLE AROM WFL  LLE AROM WFL     Balance:   Static Sitting: Ind  Dynamic Sitting: Ind  Static Standing: Madi    Dynamic Standing: Madi with [employer] and chair follow    Pt is A & O x 3  Sensation:  NT  Edema:  none noted    Vitals:  SPO2 on 6L: 89-94%    Therapeutic exercises:  LAQ x10 B    Patient education  Pt educated on role of PT, safety during mobility    Patient response to education:   Pt verbalized understanding Pt demonstrated skill Pt requires further education in this area   yes yes yes     ASSESSMENT:    Comments:  patient semi-supine in bed upon entry and agreeable to PT treatment. Pt able to complete bed mobility with light assist to trunk. Pt able to complete stand pivot transfer with [employer] and steadying assist. Extra time required to complete task. After seated rest, pt completed therapeutic exercises listed above. STS from chair required increased assist. Short bout of ambulation with chair follow and [employer] completed with pt fatiguing quickly from little activity. Pt required max cues for appropriate body approximation to [employer] with little improvement. Pt returned to chair and encouraged to remain in chair for improved lung function and overall strength. All needs met, call light in reach, and RN notified. Treatment:  Patient practiced and was instructed in the following treatment:    Bed Mobility: VCs provided for sequencing and safety during mobility. Manual assist provided for completion of task. Transfer Training: Verbal and tactile cueing provided for sequencing and safety during mobility. Manual assist provided for completion of task  Gait Training: Ambulation with WW and verbal cues for proper technique and safety. Manual assist provided for completion of task   Patient Education: Education provided on importance of continued OOB activity for improved outcomes    PLAN:    Patient is making good progress towards established goals. Will continue with current POC.       Time in  1115  Time out  1153    Total Treatment Time  38 minutes     CPT codes:  [] Gait training 80177 - minutes  [] Manual therapy 90380 - minutes  [x] Therapeutic activities 10098 75 minutes  [] Therapeutic exercises 79116 - minutes  [] Neuromuscular reeducation 53725 - minutes    Kristopher Ray PT, DPT  [id number]

## 2022-10-02 NOTE — PATIENT CARE CONFERENCE
P Quality Flow/Interdisciplinary Rounds Progress Note        Quality Flow Rounds held on October 2, 2022    Disciplines Attending:  Bedside Nurse, , , and Nursing Unit Leadership    Finesse Storey was admitted on 9/28/2022  5:04 AM    Anticipated Discharge Date:  Expected Discharge Date: 09/30/22    Disposition:    Blas Score:  Blas Scale Score: 19    Readmission Risk              Risk of Unplanned Readmission:  39           Discussed patient goal for the day, patient clinical progression, and barriers to discharge.   The following Goal(s) of the Day/Commitment(s) have been identified:  Labs - Report Results      Mahesh Membreno RN  October 2, 2022

## 2022-10-02 NOTE — PROGRESS NOTES
Occupational Therapy  OT BEDSIDE TREATMENT NOTE   [address] [facility], [place], operated [facility] [address] [facility]  [address] [place] [address]       [id number]  Patient Name: Jyoti Chase  MRN: [id number]  : 1955  Room: [address] [place] [address]-     Per OT Eval:    Evaluating OT: Huyen Mcdonald OTR/L #[id number]      Referring Provider: Saniya Holly MD  Specific Provider Orders/Date: OT eval and treat 22     Diagnosis: Acute respiratory failure with hypoxia ([address] [J96.01]  Congestive heart failure, unspecified HF chronicity, unspecified heart failure type ([address] [I50.9]   Pt admitted to hospital with [facility] rehab facility.   Recent hospitalization      Pertinent Medical History:  has a past medical history of A-fib ([address] Able to transfer from chair to wheelchair, Abnormal mammogram, Acute on chronic respiratory failure ([address] Anemia, Anemia due to chronic illness, Ankle fracture, left, 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[address] Backache, Benign hypertension, CHF (congestive heart failure) (HCC), Chronic back pain, Chronic kidney disease, Chronic pain disorder, Chronic, continuous use of opioids, Compression fracture of thoracic vertebra (HCC), COPD (chronic obstructive pulmonary disease) ([address] Debility, Deformity of ankle and foot, acquired, Depression, Diabetes insipidus ([address] Diverticulitis, Dry eyes, Encephalopathy acute, Gait disturbance, GERD (gastroesophageal reflux disease), Hemorrhoids, Hernia, History of blood transfusion, History of Clostridium difficile, Hyperlipidemia, Hypothyroidism, Ischemic colitis, enteritis, or enterocolitis ([address] Long term (current) use of systemic steroids, Long-term current use of steroids, Lumbar disc herniation, Myalgia and myositis, unspecified, Nephrosclerosis, Nonunion of fracture, Osteoarthritis, PAF (paroxysmal atrial fibrillation) ([address] 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[address] Pulmonary hypertension ([address] Pulmonary sarcoidosis ([address] Rectal bleeding, Rhabdomyolysis, Steroid-induced avascular necrosis of shoulder ([address] Tibia fracture, and Ventral hernia without obstruction or gangrene.       Precautions:  Fall Risk, ramirez, cognition, continuous pulse ox, O2, TAPS, bed alarm  Chronic L Charcot deformity, h/o R tibia fx resulting in varus deformity     Assessment of current deficits    [x] Functional mobility         [x]ADLs           [x] Strength                  [x]Cognition    [x] Functional transfers       [x] IADLs         [x] Safety Awareness   [x]Endurance    [] Fine Coordination                      [x] Balance      [] Vision/perception   []Sensation      []Gross Motor Coordination          [] ROM           [] Delirium                   [] Motor Control      OT PLAN OF CARE   OT POC based on physician orders, patient diagnosis and results of clinical assessment     Frequency/Duration 1-3 days/wk for 2 weeks PRN   Specific OT Treatment Interventions to include:   * Instruction/training on adapted ADL techniques and AE recommendations to increase functional independence within precautions       * Training on energy conservation strategies, correct breathing pattern and techniques to improve independence/tolerance for self-care routine  * Functional transfer/mobility training/DME recommendations for increased independence, safety, and fall prevention  * Patient/Family education to increase follow through with safety techniques and functional independence  * Recommendation of environmental modifications for increased safety with functional transfers/mobility and ADLs  * Cognitive retraining/development of therapeutic activities to improve problem solving, judgement, memory, and attention for increased safety/participation in ADL/IADL tasks  * Therapeutic exercise to improve motor endurance, ROM, and functional strength for ADLs/functional transfers  * Therapeutic activities to facilitate/challenge dynamic balance, stand tolerance for increased safety and independence with ADLs  * Therapeutic activities to facilitate gross/fine motor skills for increased independence with ADLs  * Positioning to improve skin integrity, interaction with environment and functional independence     Recommended Adaptive Equipment: TBD      Home Living: Pt lives with spouse in 1 floor home. Ramped entry  (admitted from rehab)   Bathroom setup: sponge bathes, utilizes BSC for toileting needs    Equipment owned: power w/c, BSC, hospital bed, w/w, cane, home O2     Prior Level of Function: assist with ADLs , assist with IADLs; ambulated short distances only w/ w/w, usually utilizes w/c   Driving: no     Pain Level: Pt denies pain this session     Cognition: A&O: x 4; Follows 2 step directions, pleasant & cooperative            Memory:  good           Sequencing:  good            Problem solving:  fair            Judgement/safety:  fair             Functional Assessment:  AM-PAC Daily Activity Raw Score: 14/24    Initial Eval Status  Date: 9/29/22 Treatment Status  Date:  10/2/22 STGs = LTGs  Time frame: 10-14 days   Feeding Independent        Grooming Stand by Assist   Set up  Seated in chair  Modified [place]    UB Dressing Minimal Assist   Min A  Doff/brennon gown seated  Supervision    LB Dressing Dependent   Dep  Donning socks bed level  Moderate Assist    Bathing Maximal Assist     Max A  Simulated  Minimal Assist    Toileting Maximal Assist   Max/Dep  simulated Moderate Assist    Bed Mobility     Supine to sit: Minimal Assist   Sit to supine: Stand by Assist   Min A  With all bed mobility tasks  Supervision    Functional Transfers Mod A     Sit to stand transfers  Mod A sit < > stand EOB  Max A  sit < > stand chair   Mod A with walker  Stand pivot  Minimal Assist    Functional Mobility Mod A     1 side step to [facility] with w/w   Min A (2nd person for safety)  with walker & chair follow  Minimal Assist    Balance Sitting:     Static:  SBA    Dynamic: SBA  Standing:  Mod  A w/ w/w   Sitting: SBA  Standing: Mod A  With walker      Activity Tolerance Fair-     O2 sat 84-96%; cuing on pursed lip breathing techniques   Fair   89-94% 6L O2  Increased time with tasks, due to mild SOB, cues for PLB techniques   (Pt reports baseline is 4-5L at home)     Fair+   Visual/  Perceptual Glasses: no                       Education:  Pt was educated through out treatment regarding proper technique & safety with bed mobility, functional transfers & mobility, importance of OOB activity & ADL compensatory strategies to ease tasks, improve safety & prevent falls. Comments: Upon arrival pt was in bed & agreeable for therapy. At end of session pt was seated in chair, nsg aware all lines and tubes intact, call light within reach. Encouraged pt to stay up in chair for 30-60 minutes if tolerated. Pt has made Fair progress towards set goals. Continue with current plan of care      Treatment Time In:11:15            Treatment Time Out: 11:53           Treatment Charges: Mins Units   Ther Ex  81088     Manual Therapy 67608     Thera Activities 28152 23 2   ADL/Home Mgt 24811 15 1   Neuro Re-ed 20154     Group Therapy      Orthotic manage/training  04593     Non-Billable Time     Total Timed Treatment 38 3       Astrid KAUFFMAN  [address] [place] [address], [address] [place] [address]

## 2022-10-02 NOTE — PROGRESS NOTES
[facility] Inpatient   Resident Progress Note    S:  Hospital day: 4   Brief Synopsis: Tim Silvestre is a 73 y.o. female with a PMH of COPD, sarcoidosis, CHF, pulmonary hypertension, A. fib, chronic pain disorder, chronic opioid use, who presents to ED for Shortness of Breath. She is on 4L O2 chronically and intermittent BIPAP. Patient was just discharged from hospital yesterday and was sent to [facility]. She was in the hospital for rectal bleeding. She states that while she was at [facility] the staff did not know how to adjust her BiPAP settings. Due to this she became increasingly more short of breath as the day went on. The shortness of breath was accompanied by chest tightness and increased sputum production. She states that she asked for a breathing treatment but was given her inhaler to use x1. She did receive her Lasix dose yesterday. She states that since starting Lasix her lower extremity edema has been improved. Last exercise stress test from October 2016 demonstrated normal LV wall motion, myocardial thickening during systole and EF 76%. Last echo from 7/15/2022 showing EF of 60%, moderate left ventricular concentric hypertrophy, dilated right ventricle with reduced function and severe pulmonary hypertension. CT from 9/10/2022 demonstrating opacities in lung bases suggestive of subsegmental atelectasis and chronic interstitial lung disease. ED workup showed: /134, ProBNP 0469, ABG Metabolic alkalosis w/compensated respiratory acidosis and CXR with patchy bilateral airspace opacities suggesting atelectasis or mild multi-lobar interstitial pneumonitis. She was given Lasix 20mg IV and placed on bipap. Was admitted for acute hypoxic respiratory failure. Overnight/interim:   No events overnight. Patient seen and evaluated at bedside this morning. She was resting comfortably in bed with bipap in place saturation at 100%.  She has been wearing it mainly to sleep and has been on NC during the day. Denies chest pain, shortness of breath, nausea or vomiting. Anticipate discharge within 24 hours to [facility], patient needs precert      Cont meds:    sodium chloride       Scheduled meds:    potassium chloride  40 mEq Oral Once    docusate sodium  100 mg Oral Daily    Vitamin D  1,000 Units Oral Daily    furosemide  40 mg IntraVENous Daily    desmopressin  200 mcg Oral BID    dilTIAZem  30 mg Oral TID    escitalopram  10 mg Oral Daily    famotidine  20 mg Oral Daily    [Held by provider] ferrous sulfate  325 mg Oral BID    [Held by provider] furosemide  40 mg Oral Once per day on Mon Wed Fri    levothyroxine  75 mcg Oral Daily    metoprolol succinate  100 mg Oral Daily    metoprolol succinate  50 mg Oral Daily    omega-3 acid ethyl esters  1 g Oral BID    predniSONE  5 mg Oral Daily    lidocaine  1 patch TransDERmal Daily    diclofenac sodium  4 g Topical BID    sodium chloride flush  5-40 mL IntraVENous 2 times per day    Arformoterol Tartrate  15 mcg Nebulization BID    budesonide  500 mcg Nebulization BID     PRN meds: Tears Again Advanced Eyelid, oxyCODONE-acetaminophen, sodium chloride flush, sodium chloride, polyethylene glycol, acetaminophen **OR** acetaminophen, promethazine **OR** ondansetron, albuterol     I reviewed the patient's past medical and surgical history, Medications and Allergies. O:  BP (!) 143/78   Pulse 77   Temp 97.2 °F (36.2 °C) (Temporal)   Resp 22   Ht 5' (1.524 m)   Wt 168 lb 6.4 oz (76.4 kg)   LMP  (LMP Unknown)   SpO2 95%   BMI 32.89 kg/m²   24 hour I&O: I/O last 3 completed shifts: In: 600 [P.O.:600]  Out: 1175 [Urine:1175]  No intake/output data recorded. Physical Exam  Vitals reviewed. Constitutional:       General: She is not in acute distress. Appearance: She is obese. Interventions: Face mask in place. Cardiovascular:      Rate and Rhythm: Rhythm irregular. Pulmonary:      Breath sounds: Decreased air movement present. Abdominal:      Palpations: Abdomen is soft. Tenderness: There is no abdominal tenderness. Comments: Gauze overlying repaired ventral hernia    Genitourinary:     Comments: Sanchez in place   Musculoskeletal:      Right lower le+ Edema present. Left lower le+ Edema present. Right foot: No swelling. Left foot: Deformity present. No swelling. Psychiatric:         Behavior: Behavior is cooperative. Labs:  Na/K/Cl/CO2:  141/3.4/94/39 (10/02 9401)  BUN/Cr/glu/ALT/AST/amyl/lip:  14/0.9/--/14/16/--/-- (10/02 0426)  WBC/Hgb/Hct/Plts:  4.4/8.7/29.1/171 (10/02 0426)  estimated creatinine clearance is 57 mL/min (based on SCr of 0.9 mg/dL). Other pertinent labs as noted below    Radiology:  XR CHEST PORTABLE   Final Result   Bilateral airspace disease and or atelectasis with slightly improved aeration   on the left. XR CHEST PORTABLE   Final Result   Markings seen diffusely throughout the right lung slightly worsened in the   interval with slight worsening of the markings at the left lung base. XR CHEST PORTABLE   Final Result   Unchanged appearance from the day prior of shallow inspiration and patchy   bilateral airspace opacities suggesting atelectasis or mild multi lobar   pneumonitis. RECOMMENDATION:   Careful clinical correlation and follow up recommended.                Resident Assessment and Plan   Acute Hypoxic Respiratory Failure  -Patient with Sarcoid, COPD, CHF  -DUONEB, Pulmicort, Brovana  -Continue to wean Bipap  -HOLD home dose of Lasix 40mg 3 times weekly (Patient received dose of 20mg in ED today and received 40mg yesterday) - Continue to evaluate for need  -Patient will need placement to a facility that can manage her bipap settings   -ABG: Metabolic Alkalosis with compensated respiratory acidosis  -CXR on day of admission: Patchy bilateral airspace opacities suggesting atelectasis or mild multi-lobar pneumonitis   -CXR 22: Markings seen diffusely throughout the right lung slightly worsened in the interval with slight worsening of the markings at the left lung base. -CXR 22: Slightly improved on left. -Pulmonology following: Added 40mg IV lasix. May consider discontinuing pending creatinine.   -Awaiting cultures   -Flutter valve   -Vitamin D level insufficient: Add supplement   -Procalcitonin: 0.11 (0.09 on last admission)  -UDS+ for oxycodone  -Will need sleep study outpatient   -Telemetry  -Strict I/Os: Output 0.2cc/kg/hr (Decreased output yesterday)  -Add compression stockings      Decreased Urine Output  -0.2cc/kg/hr on 22  -May consider renal ultrasound or nephrology consult pending output today     Sarcoidosis  -Continue on Prednisone 5mg daily     Elevated troponin  -Downtrending  -Will not repeat at this time  -Telemetry    Atrial Fibrillation  -Continue Toprol XL (100+50mg)  -Continue on Cardizem 30mg TID   -Patient has been on Eliquis in the past but no longer on it. Discontinue for now due to recent GI bleed     Chronic Pain  -Patient on opioids chronically. -Need to discuss pain management plan. Per PDMP patient's opioid use has decreased   -Add lidocaine patch and Diclofenac gel   -UDS+ for oxycodone     Diabetes Insipidus  -Likely secondary to Sarcoidosis  -Continue home dose DDAVP     Chronic Anemia  -Hgb downtrendin.0 today   -Likely due to chronic disease  -Continue on ferrous sulfate 325 BID      Hypothyroidism  -Continue home Levothyroxine 75     Depression  -Continue home Lexapro 10mg      GERD  -Continue home Pepcid     PT/OT: Consult  GI: Pepcid  DVT prophylaxis: PCDs due to recent GI bleed   Dispo:  Admit while awaiting placement   Diet: Low sodium        Electronically signed by Logan Lang MD on 10/2/2022 at 10:12 AM  Attending physician: Dr. Michael Hooper

## 2022-10-02 NOTE — PROGRESS NOTES
Sherif (Supervisor) @ [facility] cancelled d/c today due to the facility unable to get a bipap for patient today. Message left for  regarding change. Sherif direct number is [phone].

## 2022-10-02 NOTE — CARE COORDINATION
Care Coordination  Reviewed chart. Called Shey at [facility] to see if the patients precert came back yet. Await a call back. [facility] from [facility] said to sent the patient without precert. The patient is set up to be picked up at 2 pm today to go to [facility]. 73 charge nurse notified and that we will need a covid test done prior to discharge. Envelope done. Called the patients   Mr Gi Caldwell @ [phone] and notified him of his wife's discharge and time.  Will follow

## 2022-10-02 NOTE — PROGRESS NOTES
Date: 10/1/2022    Time: 10:02 PM    Patient Placed On BIPAP/CPAP/ Non-Invasive Ventilation? Yes    If no must comment. Facial area red/color change? No           If YES are Blister/Lesion present? No   If yes must notify nursing staff  BIPAP/CPAP skin barrier?   Yes    Skin barrier type:mepilexlite       Comments:        Jaclyn Fry RCP

## 2022-10-03 LAB
ALBUMIN SERPL-MCNC: 3.1 G/DL (ref 3.5–5.2)
ALP BLD-CCNC: 106 U/L (ref 35–104)
ALT SERPL-CCNC: 15 U/L (ref 0–32)
ANION GAP SERPL CALCULATED.3IONS-SCNC: 11 MMOL/L (ref 7–16)
ANISOCYTOSIS: ABNORMAL
AST SERPL-CCNC: 20 U/L (ref 0–31)
BASOPHILS ABSOLUTE: 0 E9/L (ref 0–0.2)
BASOPHILS RELATIVE PERCENT: 0.4 % (ref 0–2)
BILIRUB SERPL-MCNC: 0.5 MG/DL (ref 0–1.2)
BUN BLDV-MCNC: 15 MG/DL (ref 6–23)
CALCIUM SERPL-MCNC: 9.1 MG/DL (ref 8.6–10.2)
CHLORIDE BLD-SCNC: 94 MMOL/L (ref 98–107)
CO2: 36 MMOL/L (ref 22–29)
CREAT SERPL-MCNC: 0.9 MG/DL (ref 0.5–1)
EOSINOPHILS ABSOLUTE: 0.46 E9/L (ref 0.05–0.5)
EOSINOPHILS RELATIVE PERCENT: 9.5 % (ref 0–6)
GFR AFRICAN AMERICAN: >60
GFR NON-AFRICAN AMERICAN: >60 ML/MIN/1.73
GLUCOSE BLD-MCNC: 71 MG/DL (ref 74–99)
HCT VFR BLD CALC: 31.1 % (ref 34–48)
HEMOGLOBIN: 8.9 G/DL (ref 11.5–15.5)
HYPOCHROMIA: ABNORMAL
LYMPHOCYTES ABSOLUTE: 0.24 E9/L (ref 1.5–4)
LYMPHOCYTES RELATIVE PERCENT: 5.2 % (ref 20–42)
MCH RBC QN AUTO: 24.5 PG (ref 26–35)
MCHC RBC AUTO-ENTMCNC: 28.6 % (ref 32–34.5)
MCV RBC AUTO: 85.7 FL (ref 80–99.9)
MONOCYTES ABSOLUTE: 0.1 E9/L (ref 0.1–0.95)
MONOCYTES RELATIVE PERCENT: 1.7 % (ref 2–12)
NEUTROPHILS ABSOLUTE: 4.03 E9/L (ref 1.8–7.3)
NEUTROPHILS RELATIVE PERCENT: 83.6 % (ref 43–80)
OVALOCYTES: ABNORMAL
PDW BLD-RTO: 16.5 FL (ref 11.5–15)
PLATELET # BLD: 177 E9/L (ref 130–450)
PMV BLD AUTO: 12.1 FL (ref 7–12)
POIKILOCYTES: ABNORMAL
POLYCHROMASIA: ABNORMAL
POTASSIUM SERPL-SCNC: 3.8 MMOL/L (ref 3.5–5)
RBC # BLD: 3.63 E12/L (ref 3.5–5.5)
ROULEAUX: ABNORMAL
SODIUM BLD-SCNC: 141 MMOL/L (ref 132–146)
TOTAL PROTEIN: 6.3 G/DL (ref 6.4–8.3)
WBC # BLD: 4.8 E9/L (ref 4.5–11.5)

## 2022-10-03 PROCEDURE — 2700000000 HC OXYGEN THERAPY PER DAY

## 2022-10-03 PROCEDURE — 6370000000 HC RX 637 (ALT 250 FOR IP)

## 2022-10-03 PROCEDURE — 85025 COMPLETE CBC W/AUTO DIFF WBC: CPT

## 2022-10-03 PROCEDURE — 80053 COMPREHEN METABOLIC PANEL: CPT

## 2022-10-03 PROCEDURE — 2140000000 HC CCU INTERMEDIATE R&B

## 2022-10-03 PROCEDURE — 94640 AIRWAY INHALATION TREATMENT: CPT

## 2022-10-03 PROCEDURE — 2580000003 HC RX 258

## 2022-10-03 PROCEDURE — 94660 CPAP INITIATION&MGMT: CPT

## 2022-10-03 PROCEDURE — 99232 SBSQ HOSP IP/OBS MODERATE 35: CPT | Performed by: FAMILY MEDICINE

## 2022-10-03 PROCEDURE — 6360000002 HC RX W HCPCS

## 2022-10-03 PROCEDURE — 36415 COLL VENOUS BLD VENIPUNCTURE: CPT

## 2022-10-03 RX ORDER — FUROSEMIDE 40 MG/1
40 TABLET ORAL DAILY
Status: DISCONTINUED | OUTPATIENT
Start: 2022-10-03 | End: 2022-10-04 | Stop reason: HOSPADM

## 2022-10-03 RX ADMIN — OXYCODONE AND ACETAMINOPHEN 1 TABLET: 5; 325 TABLET ORAL at 15:57

## 2022-10-03 RX ADMIN — DESMOPRESSIN ACETATE 200 MCG: 0.1 TABLET ORAL at 09:10

## 2022-10-03 RX ADMIN — Medication 1000 UNITS: at 09:09

## 2022-10-03 RX ADMIN — FUROSEMIDE 40 MG: 40 TABLET ORAL at 13:40

## 2022-10-03 RX ADMIN — METOPROLOL SUCCINATE 50 MG: 25 TABLET, EXTENDED RELEASE ORAL at 09:12

## 2022-10-03 RX ADMIN — OXYCODONE AND ACETAMINOPHEN 1 TABLET: 5; 325 TABLET ORAL at 09:09

## 2022-10-03 RX ADMIN — ARFORMOTEROL TARTRATE 15 MCG: 15 SOLUTION RESPIRATORY (INHALATION) at 08:32

## 2022-10-03 RX ADMIN — ARFORMOTEROL TARTRATE 15 MCG: 15 SOLUTION RESPIRATORY (INHALATION) at 22:27

## 2022-10-03 RX ADMIN — DILTIAZEM HYDROCHLORIDE 30 MG: 30 TABLET, FILM COATED ORAL at 14:27

## 2022-10-03 RX ADMIN — BUDESONIDE 500 MCG: 0.5 SUSPENSION RESPIRATORY (INHALATION) at 08:32

## 2022-10-03 RX ADMIN — BUDESONIDE 500 MCG: 0.5 SUSPENSION RESPIRATORY (INHALATION) at 22:27

## 2022-10-03 RX ADMIN — METOPROLOL SUCCINATE 100 MG: 100 TABLET, FILM COATED, EXTENDED RELEASE ORAL at 09:09

## 2022-10-03 RX ADMIN — OMEGA-3-ACID ETHYL ESTERS 1 G: 1 CAPSULE, LIQUID FILLED ORAL at 22:12

## 2022-10-03 RX ADMIN — ALBUTEROL SULFATE 2.5 MG: 2.5 SOLUTION RESPIRATORY (INHALATION) at 08:32

## 2022-10-03 RX ADMIN — DILTIAZEM HYDROCHLORIDE 30 MG: 30 TABLET, FILM COATED ORAL at 09:09

## 2022-10-03 RX ADMIN — SODIUM CHLORIDE, PRESERVATIVE FREE 10 ML: 5 INJECTION INTRAVENOUS at 22:14

## 2022-10-03 RX ADMIN — DILTIAZEM HYDROCHLORIDE 30 MG: 30 TABLET, FILM COATED ORAL at 22:12

## 2022-10-03 RX ADMIN — OXYCODONE AND ACETAMINOPHEN 1 TABLET: 5; 325 TABLET ORAL at 22:13

## 2022-10-03 RX ADMIN — DESMOPRESSIN ACETATE 200 MCG: 0.1 TABLET ORAL at 22:12

## 2022-10-03 RX ADMIN — OMEGA-3-ACID ETHYL ESTERS 1 G: 1 CAPSULE, LIQUID FILLED ORAL at 09:10

## 2022-10-03 RX ADMIN — FAMOTIDINE 20 MG: 20 TABLET, FILM COATED ORAL at 09:09

## 2022-10-03 RX ADMIN — PREDNISONE 5 MG: 5 TABLET ORAL at 09:09

## 2022-10-03 RX ADMIN — ESCITALOPRAM OXALATE 10 MG: 10 TABLET ORAL at 09:09

## 2022-10-03 RX ADMIN — LEVOTHYROXINE SODIUM 75 MCG: 0.07 TABLET ORAL at 10:13

## 2022-10-03 ASSESSMENT — PAIN SCALES - GENERAL
PAINLEVEL_OUTOF10: 0
PAINLEVEL_OUTOF10: 8
PAINLEVEL_OUTOF10: 10

## 2022-10-03 ASSESSMENT — PAIN DESCRIPTION - ORIENTATION
ORIENTATION: LEFT
ORIENTATION: RIGHT;INNER
ORIENTATION: RIGHT;INNER

## 2022-10-03 ASSESSMENT — PAIN DESCRIPTION - DESCRIPTORS
DESCRIPTORS: ACHING;DISCOMFORT;CRUSHING
DESCRIPTORS: STABBING;DISCOMFORT
DESCRIPTORS: STABBING;DISCOMFORT

## 2022-10-03 ASSESSMENT — PAIN DESCRIPTION - LOCATION
LOCATION: BACK;SHOULDER
LOCATION: SHOULDER
LOCATION: CHEST;BACK
LOCATION: BACK;SHOULDER
LOCATION: CHEST;BACK

## 2022-10-03 NOTE — PROGRESS NOTES
Date: 10/2/2022    Time: 10:48 PM    Patient Placed On BIPAP/CPAP/ Non-Invasive Ventilation? Yes    If no must comment. Facial area red/color change? No           If YES are Blister/Lesion present? No   If yes must notify nursing staff  BIPAP/CPAP skin barrier?   Yes    Skin barrier type:mepilexlite       Comments:        Laurence Butler RCP

## 2022-10-03 NOTE — PROGRESS NOTES
[facility] Attending    S: 73 y.o. female with  a history of afib (off of anticoagulation due to recent gi bleeding), HFpEF, chronic hypoxic respiratory failure on 6L NC, COPD, DI, remote c diff, htn, hypothyroidism, VAIN III, ischemic colitis, severe pulmonary hypertension, sarcoidosis, ventral hernia, fibromyalgia, avn on chronic opioid therapy who presented for hypoxia. Patient was admitted with gi bleed and uti and discharged on 9/27 to SNF. There found to sat 81% on NC and required NRB (trouble with bipap setup) so sent to ER. Is on lasix for leg edema as well. Pt given lasix, duonebs and bipap in ER with improvement of acid/base disorders on abg. Today, reports that she continues to improve, no new symptoms. Tolerating diet. O: VS- Blood pressure (!) 139/96, pulse 86, temperature 97.3 °F (36.3 °C), temperature source Temporal, resp. rate 20, height 5' (1.524 m), weight 167 lb 3.2 oz (75.8 kg), SpO2 100 %, not currently breastfeeding. Exam is as noted by resident with the following changes, additions or corrections:  Gen: NAD, A&A   HEENT: NCAT  CV- Irreg (likely a fib on monitor, occasional PVC)   Lungs-Coarse bs b/l, decreased, unlabored at rest   Ext-no C/C; tr edema b/l    Impressions:   Principal Problem (Resolved):    Acute respiratory failure with hypoxia (HCC)  Active Problems:    * No active hospital problems. *      Plan:   Pulm recommendations appreciated. Wean oxygen as able to maintain sats 88-92%. AVAPS. Continue chronic prednisone therapy. Transition Lasix to oral, 40 daily, follow fluid status closely, I and Os. Clinically improving    BP controlled overall, follow. DDAVP for hx of DI. Hold anticoagulants for now. Watch H&H. Close follow up with PCP and cardiology outpatient to discuss anticoagulation and other options. Will need outpatient follow up with Gen Surg/GI with recent GIB. Precert for [facility].      Attending Physician Statement  I have reviewed the chart and seen the patient with the resident(s). I personally reviewed images, EKG's and similar tests, if present. I personally reviewed and performed key elements of the history and exam.  I have reviewed and confirmed student and/or resident history and exam with changes as indicated above. I agree with the assessment, plan and orders as documented by the resident. Please refer to the resident and/or student note for additional information.       Elizabeth Desouza, DO

## 2022-10-03 NOTE — CARE COORDINATION
Skilled facility unable to obtain needed bi-pap. SW called for updates time 3 and was able to speak to liasson 2:35 pm.  She states they anticipate they will have the bi-pap today. She will call when it arrives. Updated patient at bedside and confirmed that she does not have a bi-pap or c-pap at home. Resident updated. 1[facility]  [facility]  [facility] to [facility] when [facility] arrives to arrange transport . Rn informed . Ambulance form and envelop in soft chart .  PASR completed

## 2022-10-03 NOTE — CARE COORDINATION
Care Coordination  Discharge held as [facility] was unable to get bi-pap for patient yesterday. Message sent to facility this am for update. Will follow.

## 2022-10-03 NOTE — PROGRESS NOTES
[facility] Quality Flow/Interdisciplinary Rounds Progress Note        Quality Flow Rounds held on October 3, 2022    Disciplines Attending:  Bedside Nurse, , , and Nursing Unit Leadership    Agnes Nazario was admitted on 9/28/2022  5:04 AM    Anticipated Discharge Date:  Expected Discharge Date: 09/30/22    Disposition:    Blas Score:  Blas Scale Score: 19    Readmission Risk              Risk of Unplanned Readmission:  39           Discussed patient goal for the day, patient clinical progression, and barriers to discharge.   The following Goal(s) of the Day/Commitment(s) have been identified:  discharge plan      Zonia Hayward RN  October 3, 2022

## 2022-10-03 NOTE — PROGRESS NOTES
Elana Rodriguez M.D.,[facility]  Kerri Bell D.O., F.A.C.O.I., Kimber Herrera M.D. Anuradha Beasley M.D. Tylor Mann D.O. Daily Pulmonary Progress Note    Patient:  Jyoti Chase 73 y.o. female MRN: [id number]     Date of Service: 10/3/2022      Synopsis     We are following patient for respiratory failure, sarcoidosis     \"CC\"  rectal bleeding    Code status: FULL       Subjective     Patient was seen and examined. Laying in bed 95% on Pap therapy, I took her off so she can eat lunch. Placed her on 4 L nasal cannula and notified respiratory therapy. Denies respiratory complaints at this time. Encouraged her to continue using incentive spirometer.   She is tolerating PAP therapy overnight and throughout the day    Review of Systems:  Denies shortness of breath  Denies headache  Denies N/V/D  Denies dizziness    24-hour events:  none    Objective   Vitals: /73   Pulse 73   Temp 96.8 °F (36 °C) (Temporal)   Resp 22   Ht 5' (1.524 m)   Wt 167 lb 3.2 oz (75.8 kg)   LMP  (LMP Unknown)   SpO2 93%   BMI 32.65 kg/m²     I/O:    Intake/Output Summary (Last 24 hours) at 10/3/2022 1600  Last data filed at 10/3/2022 0939  Gross per 24 hour   Intake --   Output 1750 ml   Net -1750 ml          IPAP: 15 cmH20  CPAP/EPAP: 6 cmH2O     CURRENT MEDS :  Scheduled Meds:   furosemide  40 mg Oral Daily    docusate sodium  100 mg Oral Daily    Vitamin D  1,000 Units Oral Daily    desmopressin  200 mcg Oral BID    dilTIAZem  30 mg Oral TID    escitalopram  10 mg Oral Daily    famotidine  20 mg Oral Daily    [Held by provider] ferrous sulfate  325 mg Oral BID    levothyroxine  75 mcg Oral Daily    metoprolol succinate  100 mg Oral Daily    metoprolol succinate  50 mg Oral Daily    omega-3 acid ethyl esters  1 g Oral BID    predniSONE  5 mg Oral Daily    lidocaine  1 patch TransDERmal Daily    diclofenac sodium  4 g Topical BID    sodium chloride flush  5-40 mL IntraVENous 2 times per day Arformoterol Tartrate  15 mcg Nebulization BID    budesonide  500 mcg Nebulization BID       Physical Exam:  General Appearance: appears comfortable in no acute distress. HEENT: Normocephalic atraumatic without obvious abnormality   Neck: Lips, mucosa, and tongue normal. Supple, symmetrical, trachea midline, no adenopathy;thyroid: no enlargement/tenderness/nodules or JVD. Lung: Breath sounds diminished bibasilar. Respirations unlabored. Symmetrical expansion. Heart: irregular   Abdomen: Soft, NT, ND. BS present x 4 quadrants. No bruit or organomegaly. Extremities: Pedal pulses 2+ symmetric b/l. Extremities normal, no cyanosis, clubbing, or edema. Musculokeletal: No joint swelling, no muscle tenderness. ROM normal in all joints of extremities. Neurologic: Mental status: Alert and oriented     Pertinent/ New Labs and Imaging Studies     Imaging Personally Reviewed:  CXR 9/30  Bilateral airspace disease and or atelectasis with slightly improved aeration   on the left. Echo 7/16/22   Ejection fraction is visually estimated at 60%. No regional wall motion abnormalities seen. Moderate left ventricular concentric hypertrophy noted. Dilated right ventricle with reduced function. Left atrial volume index of 34 ml per meters squared BSA. Moderate mitral regurgitation is present. Moderate tricuspid regurgitation. Pulmonary hypertension is severe. RVSP is 70 mmHg.    No evidence for hemodynamically significant pericardial effusion      Labs:  Lab Results   Component Value Date/Time    WBC 4.8 10/03/2022 04:38 AM    HGB 8.9 10/03/2022 04:38 AM    HCT 31.1 10/03/2022 04:38 AM    MCV 85.7 10/03/2022 04:38 AM    MCH 24.5 10/03/2022 04:38 AM    MCHC 28.6 10/03/2022 04:38 AM    RDW 16.5 10/03/2022 04:38 AM     10/03/2022 04:38 AM    MPV 12.1 10/03/2022 04:38 AM     Lab Results   Component Value Date/Time     10/03/2022 04:38 AM    K 3.8 10/03/2022 04:38 AM    K 3.9 09/29/2022 04:32 AM CL 94 10/03/2022 04:38 AM    CO2 36 10/03/2022 04:38 AM    BUN 15 10/03/2022 04:38 AM    CREATININE 0.9 10/03/2022 04:38 AM    LABALBU 3.1 10/03/2022 04:38 AM    LABALBU 3.6 05/02/2012 07:40 PM    CALCIUM 9.1 10/03/2022 04:38 AM    GFRAA >60 10/03/2022 04:38 AM    LABGLOM >60 10/03/2022 04:38 AM     Lab Results   Component Value Date/Time    PROTIME 11.0 03/11/2022 11:29 AM    PROTIME 12.7 05/02/2012 07:40 PM    INR 1.0 03/11/2022 11:29 AM     No results for input(s): PROBNP in the last 72 hours. No results for input(s): PROCAL in the last 72 hours. This SmartLink has not been configured with any valid records. Micro:  Nothing new     Assessment:    Acute on chronic respiratory failure with hypoxia and hypercapnia  GI bleed, Anemia  Pulmonary sarcoidosis  Restrictive lung disease  Obesity hypoventilation syndrome  LINDSAY, noncompliant with AVAPs  Chronic home O2 dependence 4 liters NC  Severe pulmonary hypertension WHO group 2, 3-chronic  Stage III severe COPD by Gold classification  Acute on chronic congestive heart failure with preserved EF  Moderate MR, Moderate TR  Chronic dyspnea  [place]'s disease  Diabetes mellitus II  Stage III chronic kidney disease  History of nicotine dependence  PAF  Obesity, BMI 35  Debility       Plan:   Wean oxygen as tolerated keep SpO2 between 88-92%, 4 L nasal cannula  AVAPS HS and PRN-as much as patient tolerates  Bronchodilators: Brovana/Pulmicort twice daily, albuterol as needed  Incentive spirometer  Lasix 40 mg p.o. Continue DDAVP  Chronic prednisone 5 mg daily  GI/DVT prophylaxis   Awaiting BiPAP to be delivered to [facility]. Okay to DC from pulmonary point of view when okay with others after that is set up    Plan of care reviewed in collaboration with Dr. Natasha Lea  Electronically signed by FRIDA Peralta CNP on 10/3/2022 at 4:00 PM      I personally saw, examined, and cared for the patient. Labs, medications, radiographs reviewed.  I agree with history exam and plans detailed in NP note.         Renee Dawn, DO

## 2022-10-03 NOTE — PROGRESS NOTES
[facility] Inpatient   Resident Progress Note    S:  Hospital day: 5   Brief Synopsis: Bhaskar Cr is a 73 y.o. female with a PMH of COPD, sarcoidosis, CHF, pulmonary hypertension, A. fib, chronic pain disorder, chronic opioid use, who presents to ED for Shortness of Breath. She is on 4L O2 chronically and intermittent BIPAP. Patient was just discharged from hospital and was sent to [facility]. She was in the hospital for rectal bleeding. She states that while she was at [facility] the staff did not know how to adjust her BiPAP settings. Her shortness of breath was accompanied by chest tightness and increased sputum production. She states that she asked for a breathing treatment but was given her inhaler to use x1. She did receive her Lasix dose yesterday. She states that since starting Lasix her lower extremity edema has been improved. Last exercise stress test from October 2016 demonstrated normal LV wall motion, myocardial thickening during systole and EF 76%. Last echo from 7/15/2022 showing EF of 60%, moderate left ventricular concentric hypertrophy, dilated right ventricle with reduced function and severe pulmonary hypertension. CT from 9/10/2022 demonstrating opacities in lung bases suggestive of subsegmental atelectasis and chronic interstitial lung disease. ED workup showed: /134, ProBNP 6950, ABG Metabolic alkalosis w/compensated respiratory acidosis and CXR with patchy bilateral airspace opacities suggesting atelectasis or mild multi-lobar interstitial pneumonitis. She was given Lasix 20mg IV and placed on BiPAP. Was admitted for acute hypoxic respiratory failure. Pulmonology was consulted and she was given Lasix 40mg IV, BiPAP continued. Sputum specimen was sent for microbiology culture and results are pending. PT and OT are onboard. Patient condition continues to improve, O2 sat around % on NC in the day and BiPAP only at night.  [facility] was identified as the facility patient is to be discharged to, awaiting BiPAP availability at the facility. Overnight/interim:   No events overnight. Patient seen and evaluated at bedside this morning. She was resting comfortably in bed with bipap in place saturation at 100%. She has been wearing it mainly to sleep and has been on NC during the day. Denies chest pain, shortness of breath, nausea or vomiting. PT/OT saw her yesterday  Per case [id number] is not returning her phone calls and so there aren't more information about the BiPAP availability onsite at the moment      Cont meds:    sodium chloride       Scheduled meds:    furosemide  40 mg Oral Daily    docusate sodium  100 mg Oral Daily    Vitamin D  1,000 Units Oral Daily    desmopressin  200 mcg Oral BID    dilTIAZem  30 mg Oral TID    escitalopram  10 mg Oral Daily    famotidine  20 mg Oral Daily    [Held by provider] ferrous sulfate  325 mg Oral BID    levothyroxine  75 mcg Oral Daily    metoprolol succinate  100 mg Oral Daily    metoprolol succinate  50 mg Oral Daily    omega-3 acid ethyl esters  1 g Oral BID    predniSONE  5 mg Oral Daily    lidocaine  1 patch TransDERmal Daily    diclofenac sodium  4 g Topical BID    sodium chloride flush  5-40 mL IntraVENous 2 times per day    Arformoterol Tartrate  15 mcg Nebulization BID    budesonide  500 mcg Nebulization BID     PRN meds: Tears Again Advanced Eyelid, oxyCODONE-acetaminophen, sodium chloride flush, sodium chloride, polyethylene glycol, acetaminophen **OR** acetaminophen, promethazine **OR** ondansetron, albuterol     I reviewed the patient's past medical and surgical history, Medications and Allergies. O:  BP (!) 139/96   Pulse 86   Temp 97.3 °F (36.3 °C) (Temporal)   Resp 20   Ht 5' (1.524 m)   Wt 167 lb 3.2 oz (75.8 kg)   LMP  (LMP Unknown)   SpO2 100%   BMI 32.65 kg/m²   24 hour I&O: I/O last 3 completed shifts:   In: 240 [P.O.:240]  Out: 2025 [Urine:2025]  I/O this shift:  In: - Out: 800 [Urine:800]        Physical Exam  Vitals reviewed. Constitutional:       General: She is not in acute distress. Appearance: She is obese. Interventions: Face mask in place. Cardiovascular:      Rate and Rhythm: Rhythm irregular. Pulmonary:      Breath sounds: Decreased air movement present. Abdominal:      Palpations: Abdomen is soft. Tenderness: There is no abdominal tenderness. Comments: Gauze overlying repaired ventral hernia    Genitourinary:     Comments: Sanchez in place   Musculoskeletal:      Right lower le+ Edema present. Left lower le+ Edema present. Right foot: No swelling. Left foot: Deformity present. No swelling. Psychiatric:         Behavior: Behavior is cooperative. Labs:  Na/K/Cl/CO2:  141/3.8/94/36 (10/03 0879)  BUN/Cr/glu/ALT/AST/amyl/lip:  15/0.9/--/15/20/--/-- (10/03 4271)  WBC/Hgb/Hct/Plts:  4.8/8.9/31.1/177 (10/03 5486)  estimated creatinine clearance is 57 mL/min (based on SCr of 0.9 mg/dL). Other pertinent labs as noted below    Radiology:  XR CHEST PORTABLE   Final Result   Bilateral airspace disease and or atelectasis with slightly improved aeration   on the left. XR CHEST PORTABLE   Final Result   Markings seen diffusely throughout the right lung slightly worsened in the   interval with slight worsening of the markings at the left lung base. XR CHEST PORTABLE   Final Result   Unchanged appearance from the day prior of shallow inspiration and patchy   bilateral airspace opacities suggesting atelectasis or mild multi lobar   pneumonitis. RECOMMENDATION:   Careful clinical correlation and follow up recommended.                Resident Assessment and Plan   Acute Hypoxic Respiratory Failure  -Patient with Sarcoid, COPD, CHF  -DUONEB, Pulmicort, Brovana  -Continue to wean Bipap  -HOLD home dose of Lasix 40mg 3 times weekly (Patient received dose of 20mg in ED today and received 40mg yesterday) - Continue to evaluate for need  -Patient will need placement to a facility that can manage her bipap settings   -ABG: Metabolic Alkalosis with compensated respiratory acidosis  -CXR on day of admission: Patchy bilateral airspace opacities suggesting atelectasis or mild multi-lobar pneumonitis   -CXR 22: Chris Bellow seen diffusely throughout the right lung slightly worsened in the interval with slight worsening of the markings at the left lung base. -CXR 22: Slightly improved on left. -IV Lasix d/c, PO Lasix 40mg restarted daily.  -Awaiting cultures   -Flutter valve   -Vitamin D level insufficient: Add supplement   -Procalcitonin: 0.11 (0.09 on last admission)  -UDS+ for oxycodone  -Will need sleep study outpatient   -Telemetry  -Strict I/Os  -Add compression stockings  -Discharge pending bipap arrangement at assisted living     2. Decreased Urine Output - resolved  -Output at 2025 and 800 yesterday and today    3. Sarcoidosis  -Continue on Prednisone 5mg daily     4. Elevated troponin  -Downtrending  -Will not repeat at this time  -Telemetry    5. Atrial Fibrillation  -Continue Toprol XL (100+50mg)  -Continue on Cardizem 30mg TID   -Patient has been on Eliquis in the past but no longer on it. Discontinue for now due to recent GI bleed     6. Chronic Pain  -Patient on opioids chronically. -Need to discuss pain management plan. Per PDMP patient's opioid use has decreased   -Add lidocaine patch and Diclofenac gel   -UDS+ for oxycodone     7. Diabetes Insipidus 2/2 to sarcoidosis  -Continue home dose DDAVP  -Pt also on Lasix 40mg     8. Chronic Anemia  -Hgb downtrendin.0 today   -Likely due to chronic disease  -Continue on ferrous sulfate 325 BID      9. Hypothyroidism  -Continue home Levothyroxine 75     10. Depression  -Continue home Lexapro 10mg      11. GERD  -Continue home Pepcid     PT/OT: Consult  GI: Pepcid  DVT prophylaxis: PCDs due to recent GI bleed   Dispo:  Admit while awaiting placement Diet: Low sodium        Electronically signed by Laurent Hazel MD on 10/3/2022 at 12:51 PM  Attending physician: Dr. Tricia He

## 2022-10-04 VITALS
BODY MASS INDEX: 32.83 KG/M2 | HEART RATE: 65 BPM | RESPIRATION RATE: 16 BRPM | WEIGHT: 167.19 LBS | HEIGHT: 60 IN | SYSTOLIC BLOOD PRESSURE: 138 MMHG | TEMPERATURE: 96.6 F | DIASTOLIC BLOOD PRESSURE: 98 MMHG | OXYGEN SATURATION: 100 %

## 2022-10-04 LAB
ALBUMIN SERPL-MCNC: 3 G/DL (ref 3.5–5.2)
ALP BLD-CCNC: 109 U/L (ref 35–104)
ALT SERPL-CCNC: 15 U/L (ref 0–32)
ANION GAP SERPL CALCULATED.3IONS-SCNC: 10 MMOL/L (ref 7–16)
AST SERPL-CCNC: 22 U/L (ref 0–31)
BILIRUB SERPL-MCNC: 0.5 MG/DL (ref 0–1.2)
BUN BLDV-MCNC: 16 MG/DL (ref 6–23)
CALCIUM SERPL-MCNC: 9.3 MG/DL (ref 8.6–10.2)
CHLORIDE BLD-SCNC: 93 MMOL/L (ref 98–107)
CO2: 33 MMOL/L (ref 22–29)
CREAT SERPL-MCNC: 1 MG/DL (ref 0.5–1)
GFR AFRICAN AMERICAN: >60
GFR NON-AFRICAN AMERICAN: >60 ML/MIN/1.73
GLUCOSE BLD-MCNC: 83 MG/DL (ref 74–99)
POTASSIUM SERPL-SCNC: 3.9 MMOL/L (ref 3.5–5)
REASON FOR REJECTION: NORMAL
REJECTED TEST: NORMAL
SODIUM BLD-SCNC: 136 MMOL/L (ref 132–146)
TOTAL PROTEIN: 6.2 G/DL (ref 6.4–8.3)

## 2022-10-04 PROCEDURE — 6370000000 HC RX 637 (ALT 250 FOR IP): Performed by: FAMILY MEDICINE

## 2022-10-04 PROCEDURE — 94640 AIRWAY INHALATION TREATMENT: CPT

## 2022-10-04 PROCEDURE — 6360000002 HC RX W HCPCS

## 2022-10-04 PROCEDURE — 94660 CPAP INITIATION&MGMT: CPT

## 2022-10-04 PROCEDURE — 80053 COMPREHEN METABOLIC PANEL: CPT

## 2022-10-04 PROCEDURE — 6370000000 HC RX 637 (ALT 250 FOR IP)

## 2022-10-04 PROCEDURE — 36415 COLL VENOUS BLD VENIPUNCTURE: CPT

## 2022-10-04 RX ADMIN — METOPROLOL SUCCINATE 100 MG: 100 TABLET, FILM COATED, EXTENDED RELEASE ORAL at 10:30

## 2022-10-04 RX ADMIN — FAMOTIDINE 20 MG: 20 TABLET, FILM COATED ORAL at 10:26

## 2022-10-04 RX ADMIN — DESMOPRESSIN ACETATE 200 MCG: 0.1 TABLET ORAL at 10:26

## 2022-10-04 RX ADMIN — METOPROLOL SUCCINATE 50 MG: 25 TABLET, EXTENDED RELEASE ORAL at 10:27

## 2022-10-04 RX ADMIN — Medication 1000 UNITS: at 10:26

## 2022-10-04 RX ADMIN — OMEGA-3-ACID ETHYL ESTERS 1 G: 1 CAPSULE, LIQUID FILLED ORAL at 10:26

## 2022-10-04 RX ADMIN — ARFORMOTEROL TARTRATE 15 MCG: 15 SOLUTION RESPIRATORY (INHALATION) at 07:59

## 2022-10-04 RX ADMIN — OXYCODONE AND ACETAMINOPHEN 1 TABLET: 5; 325 TABLET ORAL at 04:43

## 2022-10-04 RX ADMIN — LEVOTHYROXINE SODIUM 75 MCG: 0.07 TABLET ORAL at 10:24

## 2022-10-04 RX ADMIN — PREDNISONE 5 MG: 5 TABLET ORAL at 10:25

## 2022-10-04 RX ADMIN — DILTIAZEM HYDROCHLORIDE 30 MG: 30 TABLET, FILM COATED ORAL at 10:25

## 2022-10-04 RX ADMIN — DOCUSATE SODIUM 100 MG: 100 CAPSULE, LIQUID FILLED ORAL at 10:27

## 2022-10-04 RX ADMIN — BUDESONIDE 500 MCG: 0.5 SUSPENSION RESPIRATORY (INHALATION) at 07:59

## 2022-10-04 RX ADMIN — ESCITALOPRAM OXALATE 10 MG: 10 TABLET ORAL at 10:28

## 2022-10-04 RX ADMIN — FUROSEMIDE 40 MG: 40 TABLET ORAL at 10:26

## 2022-10-04 ASSESSMENT — PAIN SCALES - GENERAL
PAINLEVEL_OUTOF10: 10
PAINLEVEL_OUTOF10: 10

## 2022-10-04 ASSESSMENT — PAIN DESCRIPTION - LOCATION: LOCATION: CHEST;BACK;ABDOMEN

## 2022-10-04 ASSESSMENT — PAIN DESCRIPTION - DESCRIPTORS: DESCRIPTORS: DISCOMFORT

## 2022-10-04 ASSESSMENT — PAIN DESCRIPTION - ORIENTATION: ORIENTATION: INNER;LOWER

## 2022-10-04 NOTE — CARE COORDINATION
Spoke with Chele charge nurse. [facility] does have the required equipment and there was miscommunication with the nursing staff that sent the patient back. I set up the Wheelchair transport for 0800 tomorrow. Chele will inform the patient and I will update the CM in the am.  Jonathon Seay Manager of Case Management [phone].

## 2022-10-04 NOTE — CARE COORDINATION
Care Coordination:  Transportation to [facility] rescheduled via [address] 10:30. Patient, family , and nursing staff informed.

## 2022-10-04 NOTE — PROGRESS NOTES
P Quality Flow/Interdisciplinary Rounds Progress Note        Quality Flow Rounds held on October 4, 2022    Disciplines Attending:  Bedside Nurse, , , and Nursing Unit Leadership    Yanna Staton was admitted on 9/28/2022  5:04 AM    Anticipated Discharge Date:  Expected Discharge Date: 10/02/22    Disposition:    Blas Score:  Blas Scale Score: 19    Readmission Risk              Risk of Unplanned Readmission:  39           Discussed patient goal for the day, patient clinical progression, and barriers to discharge.   The following Goal(s) of the Day/Commitment(s) have been identified:  discharge to [address] Miguel, RN  October 4, 2022

## 2022-10-18 ENCOUNTER — TELEPHONE (OUTPATIENT)
Dept: FAMILY MEDICINE CLINIC | Age: 67
End: 2022-10-18

## 2022-10-18 DIAGNOSIS — G47.30 SLEEP APNEA IN ADULT: Primary | ICD-10-CM

## 2022-10-18 NOTE — TELEPHONE ENCOUNTER
The patient is in a rehab facility and they need a sleep study as part of her discharge plan  I called the sleep lab and they can do a home sleep study on the patient at the facility    Please place an order for a home sleep study and I will try and coordinate with [facility] rehab to give them the information  Thanks

## 2022-10-19 ENCOUNTER — APPOINTMENT (OUTPATIENT)
Dept: GENERAL RADIOLOGY | Age: 67
End: 2022-10-19
Payer: MEDICARE

## 2022-10-19 ENCOUNTER — APPOINTMENT (OUTPATIENT)
Dept: CT IMAGING | Age: 67
End: 2022-10-19
Payer: MEDICARE

## 2022-10-19 ENCOUNTER — HOSPITAL ENCOUNTER (EMERGENCY)
Age: 67
Discharge: HOME | End: 2022-10-20
Attending: STUDENT IN AN ORGANIZED HEALTH CARE EDUCATION/TRAINING PROGRAM
Payer: MEDICARE

## 2022-10-19 ENCOUNTER — TELEPHONE (OUTPATIENT)
Dept: OTHER | Facility: CLINIC | Age: 67
End: 2022-10-19

## 2022-10-19 DIAGNOSIS — R10.9 ABDOMINAL PAIN, UNSPECIFIED ABDOMINAL LOCATION: Primary | ICD-10-CM

## 2022-10-19 LAB
ALBUMIN SERPL-MCNC: 4.2 G/DL (ref 3.5–5.2)
ALP BLD-CCNC: 118 U/L (ref 35–104)
ALT SERPL-CCNC: 23 U/L (ref 0–32)
ANION GAP SERPL CALCULATED.3IONS-SCNC: 12 MMOL/L (ref 7–16)
ANISOCYTOSIS: ABNORMAL
AST SERPL-CCNC: 36 U/L (ref 0–31)
BACTERIA: ABNORMAL /HPF
BASOPHILS ABSOLUTE: 0 E9/L (ref 0–0.2)
BASOPHILS RELATIVE PERCENT: 0.2 % (ref 0–2)
BILIRUB SERPL-MCNC: 0.9 MG/DL (ref 0–1.2)
BILIRUBIN URINE: NEGATIVE
BLOOD, URINE: NEGATIVE
BUN BLDV-MCNC: 15 MG/DL (ref 6–23)
CALCIUM SERPL-MCNC: 10 MG/DL (ref 8.6–10.2)
CHLORIDE BLD-SCNC: 96 MMOL/L (ref 98–107)
CLARITY: CLEAR
CO2: 34 MMOL/L (ref 22–29)
COLOR: YELLOW
CREAT SERPL-MCNC: 0.9 MG/DL (ref 0.5–1)
EKG ATRIAL RATE: 76 BPM
EKG Q-T INTERVAL: 410 MS
EKG QRS DURATION: 82 MS
EKG QTC CALCULATION (BAZETT): 439 MS
EKG R AXIS: 56 DEGREES
EKG T AXIS: -46 DEGREES
EKG VENTRICULAR RATE: 69 BPM
EOSINOPHILS ABSOLUTE: 0.11 E9/L (ref 0.05–0.5)
EOSINOPHILS RELATIVE PERCENT: 1.8 % (ref 0–6)
EPITHELIAL CELLS, UA: ABNORMAL /HPF
GFR SERPL CREATININE-BSD FRML MDRD: >60 ML/MIN/1.73
GLUCOSE BLD-MCNC: 101 MG/DL (ref 74–99)
GLUCOSE URINE: NEGATIVE MG/DL
HCT VFR BLD CALC: 35.3 % (ref 34–48)
HEMOGLOBIN: 10.4 G/DL (ref 11.5–15.5)
HYPOCHROMIA: ABNORMAL
KETONES, URINE: NEGATIVE MG/DL
LACTIC ACID: 1.1 MMOL/L (ref 0.5–2.2)
LEUKOCYTE ESTERASE, URINE: ABNORMAL
LIPASE: 17 U/L (ref 13–60)
LYMPHOCYTES ABSOLUTE: 0.3 E9/L (ref 1.5–4)
LYMPHOCYTES RELATIVE PERCENT: 5.2 % (ref 20–42)
MAGNESIUM: 1.6 MG/DL (ref 1.6–2.6)
MCH RBC QN AUTO: 25.1 PG (ref 26–35)
MCHC RBC AUTO-ENTMCNC: 29.5 % (ref 32–34.5)
MCV RBC AUTO: 85.1 FL (ref 80–99.9)
MONOCYTES ABSOLUTE: 0.18 E9/L (ref 0.1–0.95)
MONOCYTES RELATIVE PERCENT: 2.6 % (ref 2–12)
NEUTROPHILS ABSOLUTE: 5.31 E9/L (ref 1.8–7.3)
NEUTROPHILS RELATIVE PERCENT: 90.4 % (ref 43–80)
NITRITE, URINE: POSITIVE
OVALOCYTES: ABNORMAL
PDW BLD-RTO: 16 FL (ref 11.5–15)
PH UA: 6.5 (ref 5–9)
PLATELET # BLD: 164 E9/L (ref 130–450)
PMV BLD AUTO: 12.6 FL (ref 7–12)
POIKILOCYTES: ABNORMAL
POLYCHROMASIA: ABNORMAL
POTASSIUM REFLEX MAGNESIUM: 3.1 MMOL/L (ref 3.5–5)
PRO-BNP: 5970 PG/ML (ref 0–125)
PROTEIN UA: NEGATIVE MG/DL
RBC # BLD: 4.15 E12/L (ref 3.5–5.5)
RBC UA: ABNORMAL /HPF (ref 0–2)
SCHISTOCYTES: ABNORMAL
SODIUM BLD-SCNC: 142 MMOL/L (ref 132–146)
SPECIFIC GRAVITY UA: 1.01 (ref 1–1.03)
TARGET CELLS: ABNORMAL
TOTAL PROTEIN: 7.4 G/DL (ref 6.4–8.3)
TROPONIN, HIGH SENSITIVITY: 13 NG/L (ref 0–9)
TROPONIN, HIGH SENSITIVITY: 15 NG/L (ref 0–9)
UROBILINOGEN, URINE: 0.2 E.U./DL
WBC # BLD: 5.9 E9/L (ref 4.5–11.5)
WBC UA: ABNORMAL /HPF (ref 0–5)

## 2022-10-19 PROCEDURE — 83605 ASSAY OF LACTIC ACID: CPT

## 2022-10-19 PROCEDURE — 71045 X-RAY EXAM CHEST 1 VIEW: CPT

## 2022-10-19 PROCEDURE — 93005 ELECTROCARDIOGRAM TRACING: CPT

## 2022-10-19 PROCEDURE — 99285 EMERGENCY DEPT VISIT HI MDM: CPT

## 2022-10-19 PROCEDURE — 6370000000 HC RX 637 (ALT 250 FOR IP)

## 2022-10-19 PROCEDURE — 96375 TX/PRO/DX INJ NEW DRUG ADDON: CPT

## 2022-10-19 PROCEDURE — 74177 CT ABD & PELVIS W/CONTRAST: CPT

## 2022-10-19 PROCEDURE — 83690 ASSAY OF LIPASE: CPT

## 2022-10-19 PROCEDURE — 83880 ASSAY OF NATRIURETIC PEPTIDE: CPT

## 2022-10-19 PROCEDURE — 6360000002 HC RX W HCPCS

## 2022-10-19 PROCEDURE — 80053 COMPREHEN METABOLIC PANEL: CPT

## 2022-10-19 PROCEDURE — 81001 URINALYSIS AUTO W/SCOPE: CPT

## 2022-10-19 PROCEDURE — 96365 THER/PROPH/DIAG IV INF INIT: CPT

## 2022-10-19 PROCEDURE — 85025 COMPLETE CBC W/AUTO DIFF WBC: CPT

## 2022-10-19 PROCEDURE — 2580000003 HC RX 258

## 2022-10-19 PROCEDURE — 6360000004 HC RX CONTRAST MEDICATION: Performed by: RADIOLOGY

## 2022-10-19 PROCEDURE — 96366 THER/PROPH/DIAG IV INF ADDON: CPT

## 2022-10-19 PROCEDURE — 84484 ASSAY OF TROPONIN QUANT: CPT

## 2022-10-19 PROCEDURE — 83735 ASSAY OF MAGNESIUM: CPT

## 2022-10-19 RX ORDER — MORPHINE SULFATE 4 MG/ML
4 INJECTION, SOLUTION INTRAMUSCULAR; INTRAVENOUS ONCE
Status: COMPLETED | OUTPATIENT
Start: 2022-10-19 | End: 2022-10-19

## 2022-10-19 RX ORDER — POTASSIUM CHLORIDE 7.45 MG/ML
10 INJECTION INTRAVENOUS ONCE
Status: COMPLETED | OUTPATIENT
Start: 2022-10-19 | End: 2022-10-20

## 2022-10-19 RX ORDER — 0.9 % SODIUM CHLORIDE 0.9 %
1000 INTRAVENOUS SOLUTION INTRAVENOUS ONCE
Status: COMPLETED | OUTPATIENT
Start: 2022-10-19 | End: 2022-10-19

## 2022-10-19 RX ORDER — ONDANSETRON 4 MG/1
4 TABLET, ORALLY DISINTEGRATING ORAL ONCE
Status: COMPLETED | OUTPATIENT
Start: 2022-10-19 | End: 2022-10-19

## 2022-10-19 RX ORDER — POTASSIUM CHLORIDE 20 MEQ/1
40 TABLET, EXTENDED RELEASE ORAL ONCE
Status: COMPLETED | OUTPATIENT
Start: 2022-10-19 | End: 2022-10-19

## 2022-10-19 RX ADMIN — MORPHINE SULFATE 4 MG: 4 INJECTION, SOLUTION INTRAMUSCULAR; INTRAVENOUS at 17:10

## 2022-10-19 RX ADMIN — IOPAMIDOL 90 ML: 755 INJECTION, SOLUTION INTRAVENOUS at 18:20

## 2022-10-19 RX ADMIN — POTASSIUM CHLORIDE 10 MEQ: 7.46 INJECTION, SOLUTION INTRAVENOUS at 16:38

## 2022-10-19 RX ADMIN — SODIUM CHLORIDE 1000 ML: 9 INJECTION, SOLUTION INTRAVENOUS at 16:39

## 2022-10-19 RX ADMIN — POTASSIUM CHLORIDE 40 MEQ: 1500 TABLET, EXTENDED RELEASE ORAL at 16:36

## 2022-10-19 RX ADMIN — ONDANSETRON 4 MG: 4 TABLET, ORALLY DISINTEGRATING ORAL at 14:20

## 2022-10-19 ASSESSMENT — PAIN DESCRIPTION - PAIN TYPE: TYPE: ACUTE PAIN

## 2022-10-19 ASSESSMENT — PAIN DESCRIPTION - ORIENTATION
ORIENTATION: ANTERIOR
ORIENTATION: OTHER (COMMENT)

## 2022-10-19 ASSESSMENT — PAIN DESCRIPTION - LOCATION
LOCATION: ABDOMEN;CHEST
LOCATION: ABDOMEN

## 2022-10-19 ASSESSMENT — PAIN SCALES - GENERAL
PAINLEVEL_OUTOF10: 8
PAINLEVEL_OUTOF10: 10

## 2022-10-19 ASSESSMENT — PAIN - FUNCTIONAL ASSESSMENT: PAIN_FUNCTIONAL_ASSESSMENT: 0-10

## 2022-10-19 ASSESSMENT — PAIN DESCRIPTION - DESCRIPTORS: DESCRIPTORS: ACHING

## 2022-10-19 NOTE — ED NOTES
72 y/o f to the ed with a c/c of chest and abdominal pain, nausea and mild SOB. Patient reports that the pain has been persistent for approximately 24 hour and that her ECF RN recommended she be evaluated at the ed. Upon arrival, patient noted PW&D presenting in NAD. Patient noted with + msp x 4, pupils PERRLA @ 3 and lung sounds that are clear and equil bilaterally. Patient placed on telemetry, BP and pulse ox. 12-lead EKG performed. Vitals noted as recorded. PIV placed with labs drawn and sent. EKG performed. Call light placed within patient's reach, and bed in lowest position with side rails up x 2 for safety. Provider at the bedside for assessment.         [facility], RN  10/19/22 [id number]
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
## 2022-10-19 NOTE — TELEPHONE ENCOUNTER
Writer contacted Dr Marshal Whalen to inform of 30 day readmission risk. 's attempt to contact ED provider was unsuccessful.     Call Back: If you need to call back to inform of disposition you can contact me at [phone]

## 2022-10-19 NOTE — ED NOTES
Patient cleaned up for urinary incontinence, placed in gown and clean pads     Pascual Bolden RN  10/19/22 [id number]

## 2022-10-20 VITALS
DIASTOLIC BLOOD PRESSURE: 87 MMHG | HEART RATE: 103 BPM | OXYGEN SATURATION: 93 % | SYSTOLIC BLOOD PRESSURE: 148 MMHG | BODY MASS INDEX: 32.39 KG/M2 | WEIGHT: 165 LBS | TEMPERATURE: 98.4 F | RESPIRATION RATE: 14 BRPM | HEIGHT: 60 IN

## 2022-10-20 ASSESSMENT — ENCOUNTER SYMPTOMS
RHINORRHEA: 0
EYE REDNESS: 0
EYE DISCHARGE: 0
PHOTOPHOBIA: 0
ABDOMINAL DISTENTION: 0
CONSTIPATION: 0
SINUS PRESSURE: 0
WHEEZING: 0
ABDOMINAL PAIN: 1
COUGH: 0
BACK PAIN: 0
VOMITING: 0
NAUSEA: 0
BLOOD IN STOOL: 0
SORE THROAT: 0
DIARRHEA: 0
SHORTNESS OF BREATH: 0

## 2022-10-20 NOTE — DISCHARGE INSTRUCTIONS
Please continue taking your home medications as they are prescribed. We recommend that you follow-up with your primary care physician to follow-up on your symptoms and discuss your recent emergency room visit. Please return the emergency room if you experience worsening of her symptoms, severe abdominal pain, severe chest pain, severe shortness of breath, severe nausea with vomiting, blood in urine, blood in your stool, or fevers greater than 100.4 °F.

## 2022-10-23 PROBLEM — N39.0 UTI (URINARY TRACT INFECTION): Status: RESOLVED | Noted: 2022-09-12 | Resolved: 2022-10-23

## 2022-11-03 ENCOUNTER — HOSPITAL ENCOUNTER (OUTPATIENT)
Dept: SLEEP CENTER | Age: 67
Discharge: HOME | End: 2022-11-03

## 2022-11-03 DIAGNOSIS — I10 BENIGN ESSENTIAL HYPERTENSION: ICD-10-CM

## 2022-11-03 DIAGNOSIS — I27.20 HYPERTENSIVE PULMONARY VASCULAR DISEASE (HCC): ICD-10-CM

## 2022-11-03 DIAGNOSIS — I48.0 PAF (PAROXYSMAL ATRIAL FIBRILLATION) (HCC): ICD-10-CM

## 2022-11-03 DIAGNOSIS — J96.11 CHRONIC RESPIRATORY FAILURE WITH HYPOXIA (HCC): Primary | Chronic | ICD-10-CM

## 2022-11-07 ENCOUNTER — HOSPITAL ENCOUNTER (OUTPATIENT)
Dept: SLEEP CENTER | Age: 67
Discharge: HOME | End: 2022-11-07
Payer: MEDICARE

## 2022-11-07 DIAGNOSIS — I48.0 PAF (PAROXYSMAL ATRIAL FIBRILLATION) (HCC): ICD-10-CM

## 2022-11-07 DIAGNOSIS — G47.33 OSA (OBSTRUCTIVE SLEEP APNEA): ICD-10-CM

## 2022-11-07 DIAGNOSIS — I50.41 ACUTE COMBINED SYSTOLIC AND DIASTOLIC CHF, NYHA CLASS 1 (HCC): Primary | ICD-10-CM

## 2022-11-07 DIAGNOSIS — I10 BENIGN ESSENTIAL HYPERTENSION: ICD-10-CM

## 2022-11-07 PROCEDURE — 2700000000 HC OXYGEN THERAPY PER DAY

## 2022-11-07 PROCEDURE — 95811 POLYSOM 6/>YRS CPAP 4/> PARM: CPT

## 2022-11-08 VITALS — OXYGEN SATURATION: 97 % | HEART RATE: 70 BPM | WEIGHT: 150 LBS | HEIGHT: 65 IN | BODY MASS INDEX: 24.99 KG/M2

## 2022-11-08 ASSESSMENT — SLEEP AND FATIGUE QUESTIONNAIRES
HOW LIKELY ARE YOU TO NOD OFF OR FALL ASLEEP WHILE SITTING QUIETLY AFTER LUNCH WITHOUT ALCOHOL: 3
HOW LIKELY ARE YOU TO NOD OFF OR FALL ASLEEP WHILE WATCHING TV: 3
ESS TOTAL SCORE: 16
HOW LIKELY ARE YOU TO NOD OFF OR FALL ASLEEP WHILE SITTING AND READING: 3
HOW LIKELY ARE YOU TO NOD OFF OR FALL ASLEEP WHILE LYING DOWN TO REST IN THE AFTERNOON WHEN CIRCUMSTANCES PERMIT: 3
HOW LIKELY ARE YOU TO NOD OFF OR FALL ASLEEP WHILE SITTING AND TALKING TO SOMEONE: 0
HOW LIKELY ARE YOU TO NOD OFF OR FALL ASLEEP WHILE SITTING INACTIVE IN A PUBLIC PLACE: 1
HOW LIKELY ARE YOU TO NOD OFF OR FALL ASLEEP WHEN YOU ARE A PASSENGER IN A CAR FOR AN HOUR WITHOUT A BREAK: 3
HOW LIKELY ARE YOU TO NOD OFF OR FALL ASLEEP IN A CAR, WHILE STOPPED FOR A FEW MINUTES IN TRAFFIC: 0

## 2022-11-09 ENCOUNTER — HOSPITAL ENCOUNTER (EMERGENCY)
Age: 67
Discharge: HOME | End: 2022-11-09
Attending: EMERGENCY MEDICINE
Payer: MEDICARE

## 2022-11-09 VITALS
DIASTOLIC BLOOD PRESSURE: 82 MMHG | BODY MASS INDEX: 24.96 KG/M2 | HEART RATE: 68 BPM | WEIGHT: 150 LBS | TEMPERATURE: 97 F | OXYGEN SATURATION: 99 % | SYSTOLIC BLOOD PRESSURE: 128 MMHG | RESPIRATION RATE: 18 BRPM

## 2022-11-09 DIAGNOSIS — K62.3 RECTAL PROLAPSE: Primary | ICD-10-CM

## 2022-11-09 PROCEDURE — 99284 EMERGENCY DEPT VISIT MOD MDM: CPT

## 2022-11-09 RX ORDER — DOCUSATE SODIUM 100 MG/1
100 CAPSULE, LIQUID FILLED ORAL 2 TIMES DAILY
Qty: 60 CAPSULE | Refills: 0 | Status: SHIPPED | OUTPATIENT
Start: 2022-11-09

## 2022-11-09 NOTE — DISCHARGE INSTRUCTIONS
Please follow-up with your primary care doctor as well as general surgery listed in your paperwork. Please return to ED for new or worsening symptoms. Please add fiber to diet.

## 2022-11-09 NOTE — ED PROVIDER NOTES
HPI:  11/9/22,   Time: 3:28 PM PETRA Gunderson is a 73 y.o. female presenting to the ED for rectal prolapse, beginning 1 day ago. The complaint has been persistent, mild in severity, and worsened by nothing. Sent by nh. No hx same. States felt rectum come out.   No fever/chills/sweats/n/v/d    Review of Systems:   Pertinent positives and negatives are stated within HPI, all other systems reviewed and are negative.          --------------------------------------------- PAST HISTORY ---------------------------------------------  Past Medical History:  has a past medical history of A-fib ([address] Able to transfer from chair to wheelchair, Abnormal mammogram, Acute on chronic respiratory failure ([address] Anemia, Anemia due to chronic illness, Ankle fracture, left, 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[address] Backache, Benign hypertension, CHF (congestive heart failure) ([address] Chronic back pain, Chronic kidney disease, Chronic pain disorder, Chronic, continuous use of opioids, Compression fracture of thoracic vertebra ([address] COPD (chronic obstructive pulmonary disease) ([address] Debility, Deformity of ankle and foot, acquired, Depression, Diabetes insipidus ([address] Diverticulitis, Dry eyes, Encephalopathy acute, Gait disturbance, GERD (gastroesophageal reflux disease), Hemorrhoids, Hernia, History of blood transfusion, History of Clostridium difficile, Hyperlipidemia, Hypothyroidism, Ischemic colitis, enteritis, or enterocolitis ([address] Long term (current) use of systemic steroids, Long-term current use of steroids, Lumbar disc herniation, Myalgia and myositis, unspecified, Nephrosclerosis, Nonunion of fracture, Osteoarthritis, PAF (paroxysmal atrial fibrillation) ([address] 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[address] Pulmonary hypertension ([address] Pulmonary sarcoidosis ([address] Rectal bleeding, Rhabdomyolysis, Steroid-induced avascular necrosis of shoulder ([address] Tibia fracture, and Ventral hernia without obstruction or gangrene. Past Surgical History:  has a past surgical history that includes fracture surgery (2010); Kyphosis surgery; Lumbar disc surgery; Tibia / fibia lengthening; other surgical history (11/1/11); Abdomen surgery (2008); Echo Complete (4/22/2013); ECHO Compl W Dop Color Flow (8/16/2015); Upper gastrointestinal endoscopy (1/4/2016); Hemorrhoid surgery (12/08/2016); Colonoscopy (2008); Colonoscopy (04/11/2014); Colonoscopy (11/23/2015); Colonoscopy (10/24/2016); Colonoscopy (07/26/2017); Upper gastrointestinal endoscopy (07/26/2017); sigmoidoscopy (N/A, 3/19/2021); and other surgical history (12/14/2021). Social History:  reports that she quit smoking about 26 years ago. Her smoking use included cigarettes. She started smoking about 51 years ago. She has a 18.75 pack-year smoking history. She has never used smokeless tobacco. She reports that she does not currently use drugs. She reports that she does not drink alcohol. Family History: family history includes Alcohol Abuse in her sister; Arthritis in her father and mother; Diabetes in her father and mother; High Blood Pressure in her father, mother, and sister; Substance Abuse in her brother and sister. The patients home medications have been reviewed. Allergies: Patient has no known allergies. ---------------------------------------------------PHYSICAL EXAM--------------------------------------    Constitutional/General: Alert and oriented x3, chronically ill appearing  Head: Normocephalic and atraumatic  Eyes: PERRL, EOMI, conjunctive normal, sclera non icteric  Mouth: Oropharynx clear, handling secretions,   Neck: Supple, full ROM, ns  Respiratory:  Not in respiratory distress  Cardiovascular:  2+ distal pulses  Chest: No chest wall tenderness  GI:  Abdomen Soft, Non tender, Non distended. Rectal prolapsed, reduced at bedside with sugar and direct pressure. Musculoskeletal: Moves all extremities x 4.  Warm and well perfused, Integument: skin warm and dry. No rashes. Lymphatic: no lymphadenopathy noted  Neurologic: GCS 15, no focal deficits,   Psychiatric: Normal Affect    -------------------------------------------------- RESULTS -------------------------------------------------  I have personally reviewed all laboratory and imaging results for this patient. Results are listed below. LABS:  No results found for this visit on 11/09/22. RADIOLOGY:  Interpreted by Radiologist.  No orders to display       EKG: This EKG is signed and interpreted by the EP. Time:   Rate:   Rhythm:   Interpretation:   Comparison:       ------------------------- NURSING NOTES AND VITALS REVIEWED ---------------------------   The nursing notes within the ED encounter and vital signs as below have been reviewed by myself. /86   Pulse 70   Temp 97 °F (36.1 °C)   Resp 18   Wt 150 lb (68 kg)   LMP  (LMP Unknown)   SpO2 100%   BMI 24.96 kg/m²   Oxygen Saturation Interpretation: Normal    The patients available past medical records and past encounters were reviewed. ------------------------------ ED COURSE/MEDICAL DECISION MAKING----------------------  Medications - No data to display      ED COURSE:       Medical Decision Making:    Prolapse reduced at bedside with sugar and direct pressure, pt kennedy well, vss, dc back to nh with outpt surgical fu      This patient's ED course included: a personal history and physicial examination    This patient has remained hemodynamically stable during their ED course. Re-Evaluations:             Re-evaluation. Patients symptoms are improving          Counseling: The emergency provider has spoken with the patient and discussed todays results, in addition to providing specific details for the plan of care and counseling regarding the diagnosis and prognosis.   Questions are answered at this time and they are agreeable with the plan.       --------------------------------- IMPRESSION AND DISPOSITION ---------------------------------    IMPRESSION  1. Rectal prolapse        DISPOSITION  Disposition: Discharge to nursing home  Patient condition is stable    NOTE: This report was transcribed using voice recognition software.  Every effort was made to ensure accuracy; however, inadvertent computerized transcription errors may be present        Kaylin Castro MD  11/09/22 1800

## 2022-11-09 NOTE — ED NOTES
Report called to [facility] at [facility]. Sherif made aware that [facility] won't be here to pick pt up for 3 hours.       Yomaira Durán RN  11/09/22 5490

## 2022-11-10 NOTE — PROGRESS NOTES
[address] [address]                               SLEEP STUDY REPORT    PATIENT NAME: Bebeto Tripathi                   :        1955  MED REC NO:   [id number]                            ROOM:  ACCOUNT NO:   [de-identified]                           ADMIT DATE: 2022  PROVIDER:     Radames Metz MD    DATE OF STUDY:  2022    REFERRING PROVIDER:  Tripp Shaffer CNP    STUDY PERFORMED:  Polysomnography. INDICATION FOR POLYSOMNOGRAPHY:  Witnessed apnea, excessive daytime  sleepiness, restless sleep, trouble with memory/concentration, frequent  waking to urinate, and enuresis. CURRENT MEDICATIONS:  Amlodipine, budesonide, arformoterol,  desmopressin, docusate, escitalopram, famotidine, Atrovent,  levothyroxine, metoprolol, prednisone, and Percocet. INTERPRETATION:  SLEEP ARCHITECTURE:  During the diagnostic portion of this study, this  patient had a total time in bed of 131 minutes. Total sleep time was  103 minutes. Sleep efficiency was 79% and sleep latency was less than  30 seconds. REM latency was not observed during this portion of the  study. SLEEP STAGING:  The patient was awake 21% of the time in bed. Stage N1  was 3% and N2 was 88% of the total sleep time. Slow wave sleep was 8%  of the sleep time and stage REM sleep was absent. RESPIRATION SUMMARY:  APNEA:  There were three apneic events including two central and one  obstructive. All events occurred during non-REM stage sleep and maximum  duration was 13 seconds. HYPOPNEA:  There were 21 hypopneic events, all occurred during non-REM  stage sleep and maximum duration was 25 seconds. APNEA/HYPOPNEA INDEX:  The apnea/hypopnea index is 14. All events  occurred in the non-supine position. POSITIVE AIRWAY PRESSURE TITRATION:  At the midpoint of the study, it  was noted that the patient did have sleep apnea and was hypoxic. Therefore, positive airway pressure was initiated with bi-level at 9/5  cm of water pressure. This was increased to a maximum of 19/14 cm of  water pressure and, during this study, supplemental oxygen at 4 liters  per minute flow rate was added. The best settings appeared to be  bi-level at 19/14 cm of water pressure with 4 liters of oxygen bled into  the system. AROUSAL ANALYSIS:  There were 26 arousals/awakenings, the sleep  disruption index is 15; (normal less than 10). LIMB MOVEMENT SUMMARY:  There were no limb movements. OXYGEN SATURATION:  Average oxygen saturation while awake was 84%. Lowest saturation was 67%, (prior to adding supplemental oxygen). The  patient spent 21% of the time with saturation at or greater than 90%. Forty eight percent of the time, saturation ranged from 80% to 89% and  11% of the time, saturation was less than 80%. HEART RATE SUMMARY:  Average heart rate while awake was 70 beats per  minute. Maximum heart rate during sleep was 100 beats per minute and  minimum heart rate was 52 beats per minute. The patient was in atrial  fibrillation with occasional to frequent PVCs with some ventricular  couplets. These were not associated with respiratory events. MISCELLANEOUS:  [place] Sleepiness Scale score is 16/24. There was no  snoring or bruxism noted. Bedtime and rise time are variable. IMPRESSION:  1. Mild-to-moderate obstructive sleep apnea. 2.  Nocturnal hypoxia. 3.  No snoring. 4.  Atrial fibrillation with frequent PVCs and couplets. 5.  Good titration with bi-level. 6.  Poor sleep hygiene. DISCUSSION:  Prior to the titration of positive airway pressure, this  patient had an apnea/hypopnea index of 14. Normal is less than five and  mild is 5 to 15, leaving this patient on the border between mild and  moderate disease. Because of the very significant nocturnal hypoxia,  treatment was initiated. The patient was intolerant of CPAP. Bi-level  was increased to a maximum of 19/14 cm of water pressure. At that  level, the patient had good results. However, in spite of supplying  supplemental oxygen, the patient remained hypoxic. Supplemental oxygen   therapy would probably benefit the patient. Also, bedtime and rise  time are both variable, suggesting poor sleep hygiene. This should be  discussed with the patient to ensure that she is getting a minimum of  six and preferably seven to eight hours of sleep on a nightly basis. SUGGESTIONS:  1.  Margot Vasques CNP, to discuss the results of study with the  patient. 2.  The patient should have bi-level at 19/14 cm of water pressure. 3.  Supplemental oxygen at 5 liters per minute should be bled into the  system and checked with nocturnal oximetry. 4.  The patient should use a ResMed AirFit P30 nasal pillows size medium  inserts with heated humidification.   5.  Sleep hygiene should be discussed with the patient as noted in the discussion above        Rosalina Quiros MD  Diplomat of Sleep Medicine    D: 11/09/2022 16:51:52       T: 11/09/2022 16:55:04     [email or web address]  Job#: [id number]     Doc#: [id number]    CC:

## 2022-12-06 DIAGNOSIS — F11.90 CHRONIC, CONTINUOUS USE OF OPIOIDS: ICD-10-CM

## 2022-12-06 DIAGNOSIS — J44.9 COPD, MODERATE (HCC): ICD-10-CM

## 2022-12-06 NOTE — TELEPHONE ENCOUNTER
Last Appointment:  8/15/2022  Future Appointments   Date Time Provider Mauri Sanford   12/12/2022  2:20 PM Christelle Harden MD [facility] GEN SURG [facility]

## 2022-12-07 RX ORDER — PROMETHAZINE HYDROCHLORIDE AND CODEINE PHOSPHATE 6.25; 1 MG/5ML; MG/5ML
5 SYRUP ORAL 4 TIMES DAILY PRN
Qty: 473 ML | Refills: 1 | OUTPATIENT
Start: 2022-12-07 | End: 2023-02-05

## 2022-12-07 RX ORDER — OXYCODONE HYDROCHLORIDE AND ACETAMINOPHEN 5; 325 MG/1; MG/1
1 TABLET ORAL EVERY 4 HOURS PRN
Qty: 150 TABLET | Refills: 0 | OUTPATIENT
Start: 2022-12-07 | End: 2023-01-06

## 2022-12-08 ENCOUNTER — TELEPHONE (OUTPATIENT)
Dept: FAMILY MEDICINE CLINIC | Age: 67
End: 2022-12-08

## 2022-12-08 DIAGNOSIS — R05.3 CHRONIC COUGH: ICD-10-CM

## 2022-12-08 DIAGNOSIS — G89.4 CHRONIC PAIN SYNDROME: Primary | ICD-10-CM

## 2022-12-08 RX ORDER — OXYCODONE HYDROCHLORIDE AND ACETAMINOPHEN 5; 325 MG/1; MG/1
1 TABLET ORAL EVERY 4 HOURS PRN
Qty: 150 TABLET | Refills: 0 | Status: SHIPPED
Start: 2022-12-08 | End: 2023-01-09 | Stop reason: SDUPTHER

## 2022-12-08 RX ORDER — OXYCODONE HYDROCHLORIDE AND ACETAMINOPHEN 5; 325 MG/1; MG/1
TABLET ORAL
COMMUNITY
Start: 2022-11-23 | End: 2022-12-08 | Stop reason: SDUPTHER

## 2022-12-08 RX ORDER — PROMETHAZINE HYDROCHLORIDE AND CODEINE PHOSPHATE 6.25; 1 MG/5ML; MG/5ML
5 SYRUP ORAL 4 TIMES DAILY PRN
Qty: 473 ML | Refills: 0 | Status: SHIPPED
Start: 2022-12-08 | End: 2023-01-09 | Stop reason: SDUPTHER

## 2022-12-08 NOTE — TELEPHONE ENCOUNTER
Patient phoned stated that she was in [facility] and was just discharged yesterday. Patient is all out of her pain medications and is very upset that she can not get a refill until she is seen.   I tried to get patient in today but patient has another appointment today at 1:45 and will not be able to get here until 3:40   Please advise  Thank you April

## 2022-12-08 NOTE — TELEPHONE ENCOUNTER
Hi!    I had to review her charts prior to prescribing these medications as she has not been seen in clinic in a while. Medications ordered as one month supplies    Please advise patient that she needs to come in for an appointment in that time period. Thank you!     Roxana Beverly MD  12/8/2022  12:31 PM

## 2022-12-16 ENCOUNTER — OFFICE VISIT (OUTPATIENT)
Dept: FAMILY MEDICINE CLINIC | Age: 67
End: 2022-12-16
Payer: MEDICARE

## 2022-12-16 VITALS
SYSTOLIC BLOOD PRESSURE: 106 MMHG | HEART RATE: 68 BPM | DIASTOLIC BLOOD PRESSURE: 67 MMHG | TEMPERATURE: 97.4 F | OXYGEN SATURATION: 98 %

## 2022-12-16 DIAGNOSIS — F11.90 CHRONIC, CONTINUOUS USE OF OPIOIDS: ICD-10-CM

## 2022-12-16 DIAGNOSIS — K59.04 CHRONIC IDIOPATHIC CONSTIPATION: ICD-10-CM

## 2022-12-16 DIAGNOSIS — Z09 HOSPITAL DISCHARGE FOLLOW-UP: Primary | ICD-10-CM

## 2022-12-16 DIAGNOSIS — M17.11 PRIMARY OSTEOARTHRITIS OF RIGHT KNEE: ICD-10-CM

## 2022-12-16 PROCEDURE — G8484 FLU IMMUNIZE NO ADMIN: HCPCS | Performed by: FAMILY MEDICINE

## 2022-12-16 PROCEDURE — 1036F TOBACCO NON-USER: CPT | Performed by: FAMILY MEDICINE

## 2022-12-16 PROCEDURE — 1090F PRES/ABSN URINE INCON ASSESS: CPT | Performed by: FAMILY MEDICINE

## 2022-12-16 PROCEDURE — 3078F DIAST BP <80 MM HG: CPT | Performed by: FAMILY MEDICINE

## 2022-12-16 PROCEDURE — 3017F COLORECTAL CA SCREEN DOC REV: CPT | Performed by: FAMILY MEDICINE

## 2022-12-16 PROCEDURE — G8417 CALC BMI ABV UP PARAM F/U: HCPCS | Performed by: FAMILY MEDICINE

## 2022-12-16 PROCEDURE — 1124F ACP DISCUSS-NO DSCNMKR DOCD: CPT | Performed by: FAMILY MEDICINE

## 2022-12-16 PROCEDURE — 3074F SYST BP LT 130 MM HG: CPT | Performed by: FAMILY MEDICINE

## 2022-12-16 PROCEDURE — G8399 PT W/DXA RESULTS DOCUMENT: HCPCS | Performed by: FAMILY MEDICINE

## 2022-12-16 PROCEDURE — G8427 DOCREV CUR MEDS BY ELIG CLIN: HCPCS | Performed by: FAMILY MEDICINE

## 2022-12-16 PROCEDURE — 99214 OFFICE O/P EST MOD 30 MIN: CPT | Performed by: FAMILY MEDICINE

## 2022-12-16 RX ORDER — TRIAMCINOLONE ACETONIDE 40 MG/ML
40 INJECTION, SUSPENSION INTRA-ARTICULAR; INTRAMUSCULAR ONCE
Status: COMPLETED | OUTPATIENT
Start: 2022-12-16 | End: 2022-12-16

## 2022-12-16 RX ORDER — LIDOCAINE HYDROCHLORIDE 10 MG/ML
6 INJECTION, SOLUTION INFILTRATION; PERINEURAL ONCE
Status: COMPLETED | OUTPATIENT
Start: 2022-12-16 | End: 2022-12-16

## 2022-12-16 RX ORDER — DOCUSATE SODIUM 100 MG/1
100 CAPSULE, LIQUID FILLED ORAL 2 TIMES DAILY
Qty: 60 CAPSULE | Refills: 0 | Status: SHIPPED | OUTPATIENT
Start: 2022-12-16

## 2022-12-16 RX ADMIN — TRIAMCINOLONE ACETONIDE 40 MG: 40 INJECTION, SUSPENSION INTRA-ARTICULAR; INTRAMUSCULAR at 17:28

## 2022-12-16 RX ADMIN — LIDOCAINE HYDROCHLORIDE 6 ML: 10 INJECTION, SOLUTION INFILTRATION; PERINEURAL at 17:01

## 2022-12-16 SDOH — ECONOMIC STABILITY: FOOD INSECURITY: WITHIN THE PAST 12 MONTHS, THE FOOD YOU BOUGHT JUST DIDN'T LAST AND YOU DIDN'T HAVE MONEY TO GET MORE.: NEVER TRUE

## 2022-12-16 SDOH — ECONOMIC STABILITY: FOOD INSECURITY: WITHIN THE PAST 12 MONTHS, YOU WORRIED THAT YOUR FOOD WOULD RUN OUT BEFORE YOU GOT MONEY TO BUY MORE.: NEVER TRUE

## 2022-12-16 ASSESSMENT — SOCIAL DETERMINANTS OF HEALTH (SDOH): HOW HARD IS IT FOR YOU TO PAY FOR THE VERY BASICS LIKE FOOD, HOUSING, MEDICAL CARE, AND HEATING?: SOMEWHAT HARD

## 2022-12-16 NOTE — PROGRESS NOTES
[address] [facility]  Department of [facility]  Family Medicine Residency Program    Date of Visit: 2022     Chief Complaint     Chief Complaint   Patient presents with    Follow-up    Follow-Up from Hospital        History of Present Illness     HPI:  73 y.o. female  with pmh of CHF (NYHA Class I), HTN, PAF, Hypothyroidism , hx of rectal prolapse as well as multiple other comorbidities who presents for follow up after recent hospital/ SNF discharge.  present in the room , no paperwork from the SNF available at this time. Recent hospital discharge reviewed. Patient admitted 2022 ,discharged 10/4/2022    She was admitted at the time for SOB with chest tightness and increased sputum production. Pertinent labs included proBNP 6282, with ABG revealing metabolic alkalosis w/ compensated respiratory acidosis and cxr with patchy multilobar interstitial pneumonitis. Patient was treated with duonebs, pulmicort, and brovana. She was placed on bipap at night and NC during the day. Pulmonology was consulted and recommended lasix 40 mg daily. She was then discharged to [facility] with 40 mg lasix daily in a stable condition and was admitted there until recently. Advised to go for cardiology, nephrology and general surgery referrals after discharge. Patient presents today after discharge from SNF. Notes overall she is doing well , complaining of right knee pain today. Complaints consistent with hx of osteoarthritis, patient non ambulatory ; usually in a wheelchair. Notes pain around lateral and medial aspects of patella ; chronic. Would like knee injection today. No other concerns at this time.     Social History     Tobacco Use    Smoking status: Former     Packs/day: 0.50     Years: 25.00     Pack years: 12.50     Types: Cigarettes     Start date:      Quit date: 9/10/1996     Years since quittin.2    Smokeless tobacco: Never    Tobacco comments:     Patient quit smoking 26 yrs.ago. Vaping Use    Vaping Use: Never used    Passive vaping exposure: Yes   Substance Use Topics    Alcohol use: Never     Alcohol/week: 0.0 standard drinks    Drug use: Not Currently     Comment: 1996 [place]       ROS:    Reviewed, pertinent as above otherwise negative    Objective:    VS:  Blood pressure 106/67, pulse 68, temperature 97.4 °F (36.3 °C), temperature source Temporal, SpO2 98 %, not currently breastfeeding. Physical Exam  Constitutional:       Comments: Wheelchair bound   Cardiovascular:      Rate and Rhythm: Normal rate and regular rhythm. Pulses: Normal pulses. Heart sounds: Normal heart sounds. No murmur heard. No gallop. Pulmonary:      Effort: Pulmonary effort is normal. No respiratory distress. Breath sounds: Normal breath sounds. No wheezing. Comments: Chronically on 4 L O2  Musculoskeletal:         General: Tenderness present. Comments: Tenderness of right knee, lateral and medial aspects of patella. Crepitus noted. No obvious effusions. Near normal flexion and extension of the knee. Neurological:      Mental Status: She is alert and oriented to person, place, and time. Injection Procedure Note  R/B/A reviewed with pt. Verbal consent was obtained for the procedure. The joint was prepped with providine-iodine cleansing swab and alcohol. A 23 gauge needle was inserted into the joint. After aspirating to ensure no blood in the syringe, 5 ml 1% lidocaine and 1 ml of kenalog was then injected into the joint through the same needle. The needle was removed and the area cleansed and dressed. Verbal care instructions provided to pt. Complications:  None; patient tolerated the procedure well. Assessment/Plan:    1.  Hospital discharge follow-up  Hospital discharge reviewed   No SNF paperwork available  Advise  to bring paperwork so medication changes can be reviewed  Advise follow ups on nephrology, general surgery and cardiology appointments    2. Primary osteoarthritis of right knee  Steroid injection to the right knee as mentioned above  - triamcinolone acetonide (KENALOG-40) injection 40 mg  - lidocaine 1 % injection 6 mL    3. Chronic idiopathic constipation  Refill colace, keep on adequate bowel regimen as patient has hx of rectal prolapse  - docusate sodium (COLACE) 100 MG capsule; Take 1 capsule by mouth 2 times daily  Dispense: 60 capsule; Refill: 0    4. Chronic, continuous use of opioids  Patient with chronic opioid use  Does have old naloxone intranasal spray at home,  cannot recall how old  Use of naloxone with opioid use discussed  Reorder new nasal spray for patient  - naloxone (NARCAN) 0.4 MG/ML liquid nasal spray    RTO 1 months or sooner prn for any persistent, new, or worsening symptoms. Please see Patient Instructions for further counseling and information given. Advised to please be adherent to the treatment plans discussed today, and please call with any questions or concerns, letting the office know of any reasons that the plans may not be followed. The risks of untreated conditions include worsening illness, injury, disability, and possibly, death. Please call if symptoms change in any way, worsen, or fail to completely resolve, as this could necessitate a change to treatment plans. Patient and/or caregiver expressed understanding. Indications and proper use of medication(s) reviewed. Potential side-effects and risks of medication(s) also explained. Patient and/or caregiver was instructed to call if any new symptoms develop prior to next visit. Health risk factors discussed and addressed.      Electronically signed by Viv Lewis MD PGY-3 on 12/16/2022 at 3:39 PM  This case was discussed with attending physician: Dr. Dain Vang

## 2022-12-16 NOTE — PROGRESS NOTES
S: 73 y.o. female here for hospital f/u after SNF discharge for acute hypoxic respiratory failure. Copd, bipap. OA in knees. H/o copd, sarcoidosis. 4-6 NC O2      O: VS: /67 (Site: Right Wrist)   Pulse 68   Temp 97.4 °F (36.3 °C) (Temporal)   LMP  (LMP Unknown)   SpO2 98% Comment: on 6L o2   General: NAD, alert and interacting appropriately. CV:  RRR, no gallops, rubs, or murmurs    Resp: CTAB   Abd:  Soft, nontender   Ext:  near nl flexion/extension of knee. Ttp lateral and medial patella. Impression: hospital f/u for acute resp failure  Plan:   Naloxone. Knee injection. I was present for the entire procedure. Attending Physician Statement  I have discussed the case, including pertinent history and exam findings with the resident. I also have seen the patient and performed key portions of the examination. I agree with the documented assessment and plan.

## 2022-12-19 ENCOUNTER — OFFICE VISIT (OUTPATIENT)
Dept: SURGERY | Age: 67
End: 2022-12-19
Payer: MEDICARE

## 2022-12-19 VITALS
TEMPERATURE: 96.3 F | OXYGEN SATURATION: 100 % | WEIGHT: 150 LBS | DIASTOLIC BLOOD PRESSURE: 103 MMHG | BODY MASS INDEX: 29.45 KG/M2 | HEART RATE: 99 BPM | RESPIRATION RATE: 18 BRPM | HEIGHT: 60 IN | SYSTOLIC BLOOD PRESSURE: 150 MMHG

## 2022-12-19 DIAGNOSIS — K62.3 RECTAL PROLAPSE: Primary | ICD-10-CM

## 2022-12-19 PROCEDURE — G8484 FLU IMMUNIZE NO ADMIN: HCPCS | Performed by: SURGERY

## 2022-12-19 PROCEDURE — 99214 OFFICE O/P EST MOD 30 MIN: CPT | Performed by: SURGERY

## 2022-12-19 PROCEDURE — 3017F COLORECTAL CA SCREEN DOC REV: CPT | Performed by: SURGERY

## 2022-12-19 PROCEDURE — 1124F ACP DISCUSS-NO DSCNMKR DOCD: CPT | Performed by: SURGERY

## 2022-12-19 PROCEDURE — G8417 CALC BMI ABV UP PARAM F/U: HCPCS | Performed by: SURGERY

## 2022-12-19 PROCEDURE — 3074F SYST BP LT 130 MM HG: CPT | Performed by: SURGERY

## 2022-12-19 PROCEDURE — G8428 CUR MEDS NOT DOCUMENT: HCPCS | Performed by: SURGERY

## 2022-12-19 PROCEDURE — 1090F PRES/ABSN URINE INCON ASSESS: CPT | Performed by: SURGERY

## 2022-12-19 PROCEDURE — 3078F DIAST BP <80 MM HG: CPT | Performed by: SURGERY

## 2022-12-19 PROCEDURE — 1036F TOBACCO NON-USER: CPT | Performed by: SURGERY

## 2022-12-19 PROCEDURE — G8399 PT W/DXA RESULTS DOCUMENT: HCPCS | Performed by: SURGERY

## 2022-12-19 NOTE — PROGRESS NOTES
Progress Note - Follow up    Patient's Name/Date of Birth: Joan Ibarra / 1955    Date: 12/19/2022    PCP: Donis Myrick MD    Referring Physician:   Skinny Shore MD  [phone]    No chief complaint on file. HPI:    The patient said she has been having rectal bleeding lately. She said she feels something coming out of her rectum. She said she was in the ER and they reduced her hemorrhoids with sugar. She was in rehab and just got home about a week ago. She was in the ER with CHF and multiple medical issues and spent time rehab for these. Her  said he can reduce the prolapse manually and does this 2-3 times a month. About 2-3 inches protrude when this happens. Patient's medications, allergies, past medical, surgical, social and family histories were reviewed and updated as appropriate. Review of Systems  Constitutional: negative  Respiratory: negative  Cardiovascular: negative  Gastrointestinal: as in hpi  Genitourinary:negative  Integument/breast: negative    Physical Exam:  BP (!) 150/103   Pulse 99   Temp (!) 96.3 °F (35.7 °C) (Infrared)   Resp 18   Ht 5' (1.524 m)   Wt 150 lb (68 kg)   LMP  (LMP Unknown)   SpO2 100%   BMI 29.29 kg/m²   General appearance: alert, cooperative and in no acute distress. Lungs: normal work of breathing  Heart: regular rate  Abdomen:  soft, nontender, nondistended  Musculoskeletal: symmetrical without edema. Skin: normal    Impression/Plan:  78y.o. year old female with rectal prolapse    Discussed the multiple surgical options. At this time, she does not appear to be appropriate for an abdominal operation and colon resection. She may be a candidate for perineal procedures pending progression of her medical issues. Will refer to CRS to discuss this.       Electronically by Maira Reyna MD, General Surgery  on 12/19/2022 at 3:59 PM      Send copy of H&P to PCP, Donis Myrick MD and referring physician, Skinny Shore MD      12/19/2022

## 2022-12-20 DIAGNOSIS — K62.3 RECTAL PROLAPSE: Primary | ICD-10-CM

## 2022-12-26 RX ORDER — NALOXONE HYDROCHLORIDE 0.4 MG/ML
0.4 INJECTION, SOLUTION INTRAMUSCULAR; INTRAVENOUS; SUBCUTANEOUS PRN
Qty: 1 ML | Refills: 2 | Status: SHIPPED
Start: 2022-12-26 | End: 2022-12-26

## 2022-12-26 RX ORDER — NALOXONE HYDROCHLORIDE 4 MG/.1ML
1 SPRAY NASAL PRN
Qty: 1 EACH | Refills: 2 | Status: SHIPPED | OUTPATIENT
Start: 2022-12-26

## 2023-01-03 DIAGNOSIS — G89.4 CHRONIC PAIN SYNDROME: ICD-10-CM

## 2023-01-03 DIAGNOSIS — R05.3 CHRONIC COUGH: ICD-10-CM

## 2023-01-03 NOTE — TELEPHONE ENCOUNTER
Last Appointment:  12/16/2022  Future Appointments   Date Time Provider Mauri Sanford   2/13/2023  3:00 PM MD Netta Rgealado [facility]

## 2023-01-05 DIAGNOSIS — G89.4 CHRONIC PAIN SYNDROME: ICD-10-CM

## 2023-01-05 NOTE — TELEPHONE ENCOUNTER
Last Appointment:  12/16/2022  Future Appointments   Date Time Provider Department Center   2/13/2023  3:00 PM Lory Perez MD [facility]

## 2023-01-06 ENCOUNTER — TELEPHONE (OUTPATIENT)
Dept: FAMILY MEDICINE CLINIC | Age: 68
End: 2023-01-06

## 2023-01-09 RX ORDER — PROMETHAZINE HYDROCHLORIDE AND CODEINE PHOSPHATE 6.25; 1 MG/5ML; MG/5ML
5 SYRUP ORAL 4 TIMES DAILY PRN
Qty: 473 ML | Refills: 0 | Status: SHIPPED | OUTPATIENT
Start: 2023-01-09 | End: 2023-03-10

## 2023-01-09 RX ORDER — OXYCODONE HYDROCHLORIDE AND ACETAMINOPHEN 5; 325 MG/1; MG/1
1 TABLET ORAL EVERY 4 HOURS PRN
Qty: 145 TABLET | Refills: 0 | Status: ON HOLD | OUTPATIENT
Start: 2023-01-09 | End: 2023-02-08

## 2023-01-18 NOTE — ED TRIAGE NOTES
Patient bleeding controlled at this time Retention Suture Text: Retention sutures were placed to support the closure and prevent dehiscence.

## 2023-02-04 ENCOUNTER — APPOINTMENT (OUTPATIENT)
Dept: GENERAL RADIOLOGY | Age: 68
DRG: 291 | End: 2023-02-04
Payer: MEDICARE

## 2023-02-04 ENCOUNTER — APPOINTMENT (OUTPATIENT)
Dept: CT IMAGING | Age: 68
DRG: 291 | End: 2023-02-04
Payer: MEDICARE

## 2023-02-04 ENCOUNTER — HOSPITAL ENCOUNTER (INPATIENT)
Age: 68
LOS: 4 days | Discharge: HOME HEALTH | DRG: 291 | End: 2023-02-08
Attending: EMERGENCY MEDICINE | Admitting: FAMILY MEDICINE
Payer: MEDICARE

## 2023-02-04 DIAGNOSIS — I48.0 PAF (PAROXYSMAL ATRIAL FIBRILLATION) (HCC): ICD-10-CM

## 2023-02-04 DIAGNOSIS — E03.9 HYPOTHYROIDISM, UNSPECIFIED TYPE: ICD-10-CM

## 2023-02-04 DIAGNOSIS — M25.551 BILATERAL HIP PAIN: ICD-10-CM

## 2023-02-04 DIAGNOSIS — M25.552 BILATERAL HIP PAIN: ICD-10-CM

## 2023-02-04 DIAGNOSIS — I50.41 ACUTE COMBINED SYSTOLIC AND DIASTOLIC CHF, NYHA CLASS 1 (HCC): Primary | ICD-10-CM

## 2023-02-04 DIAGNOSIS — D86.9 SARCOIDOSIS: ICD-10-CM

## 2023-02-04 DIAGNOSIS — G89.4 CHRONIC PAIN SYNDROME: ICD-10-CM

## 2023-02-04 DIAGNOSIS — R53.81 DEBILITY: ICD-10-CM

## 2023-02-04 DIAGNOSIS — I50.9 ACUTE CONGESTIVE HEART FAILURE, UNSPECIFIED HEART FAILURE TYPE (HCC): ICD-10-CM

## 2023-02-04 DIAGNOSIS — J96.01 ACUTE RESPIRATORY FAILURE WITH HYPOXIA (HCC): ICD-10-CM

## 2023-02-04 LAB
ALBUMIN SERPL-MCNC: 3.9 G/DL (ref 3.5–5.2)
ALP BLD-CCNC: 125 U/L (ref 35–104)
ALT SERPL-CCNC: 58 U/L (ref 0–32)
ANION GAP SERPL CALCULATED.3IONS-SCNC: 7 MMOL/L (ref 7–16)
ANISOCYTOSIS: ABNORMAL
AST SERPL-CCNC: 41 U/L (ref 0–31)
B.E.: 4.7 MMOL/L (ref -3–3)
BASOPHILS ABSOLUTE: 0.04 E9/L (ref 0–0.2)
BASOPHILS RELATIVE PERCENT: 0.8 % (ref 0–2)
BILIRUB SERPL-MCNC: 0.5 MG/DL (ref 0–1.2)
BUN BLDV-MCNC: 18 MG/DL (ref 6–23)
CALCIUM SERPL-MCNC: 9.4 MG/DL (ref 8.6–10.2)
CHLORIDE BLD-SCNC: 98 MMOL/L (ref 98–107)
CO2: 33 MMOL/L (ref 22–29)
COHB: 0.9 % (ref 0–1.5)
CREAT SERPL-MCNC: 1 MG/DL (ref 0.5–1)
CRITICAL: ABNORMAL
DATE ANALYZED: ABNORMAL
DATE OF COLLECTION: ABNORMAL
EOSINOPHILS ABSOLUTE: 0.32 E9/L (ref 0.05–0.5)
EOSINOPHILS RELATIVE PERCENT: 5.9 % (ref 0–6)
FIO2: 70 %
GFR SERPL CREATININE-BSD FRML MDRD: >60 ML/MIN/1.73
GLUCOSE BLD-MCNC: 115 MG/DL (ref 74–99)
HCO3: 30.4 MMOL/L (ref 22–26)
HCT VFR BLD CALC: 34.2 % (ref 34–48)
HEMOGLOBIN: 10.6 G/DL (ref 11.5–15.5)
HHB: 0.4 % (ref 0–5)
HYPOCHROMIA: ABNORMAL
LAB: ABNORMAL
LYMPHOCYTES ABSOLUTE: 0.5 E9/L (ref 1.5–4)
LYMPHOCYTES RELATIVE PERCENT: 9.2 % (ref 20–42)
Lab: ABNORMAL
MCH RBC QN AUTO: 25.4 PG (ref 26–35)
MCHC RBC AUTO-ENTMCNC: 31 % (ref 32–34.5)
MCV RBC AUTO: 81.8 FL (ref 80–99.9)
METHB: 0.3 % (ref 0–1.5)
MODE: ABNORMAL
MONOCYTES ABSOLUTE: 0.33 E9/L (ref 0.1–0.95)
MONOCYTES RELATIVE PERCENT: 5.9 % (ref 2–12)
NEUTROPHILS ABSOLUTE: 4.29 E9/L (ref 1.8–7.3)
NEUTROPHILS RELATIVE PERCENT: 78.2 % (ref 43–80)
NUCLEATED RED BLOOD CELLS: 0.8 /100 WBC
O2 CONTENT: 17 ML/DL
O2 SATURATION: 99.6 % (ref 92–98.5)
O2HB: 98.4 % (ref 94–97)
OPERATOR ID: 1741
OVALOCYTES: ABNORMAL
PATIENT TEMP: 37 C
PCO2: 49.9 MMHG (ref 35–45)
PDW BLD-RTO: 16.5 FL (ref 11.5–15)
PEEP/CPAP: 6 CMH2O
PFO2: 3.3 MMHG/%
PH BLOOD GAS: 7.4 (ref 7.35–7.45)
PLATELET # BLD: 135 E9/L (ref 130–450)
PMV BLD AUTO: 11 FL (ref 7–12)
PO2: 230.9 MMHG (ref 75–100)
POIKILOCYTES: ABNORMAL
POTASSIUM REFLEX MAGNESIUM: 4 MMOL/L (ref 3.5–5)
PRO-BNP: 7730 PG/ML (ref 0–125)
PS: 12 CMH20
RBC # BLD: 4.18 E12/L (ref 3.5–5.5)
RR MECHANICAL: 16 B/MIN
SODIUM BLD-SCNC: 138 MMOL/L (ref 132–146)
SOURCE, BLOOD GAS: ABNORMAL
THB: 11.9 G/DL (ref 11.5–16.5)
TIME ANALYZED: 1537
TOTAL PROTEIN: 6.3 G/DL (ref 6.4–8.3)
TOXIC GRANULATION: ABNORMAL
TROPONIN, HIGH SENSITIVITY: 17 NG/L (ref 0–9)
TROPONIN, HIGH SENSITIVITY: 18 NG/L (ref 0–9)
WBC # BLD: 5.5 E9/L (ref 4.5–11.5)

## 2023-02-04 PROCEDURE — 6370000000 HC RX 637 (ALT 250 FOR IP): Performed by: EMERGENCY MEDICINE

## 2023-02-04 PROCEDURE — 82805 BLOOD GASES W/O2 SATURATION: CPT

## 2023-02-04 PROCEDURE — 71045 X-RAY EXAM CHEST 1 VIEW: CPT

## 2023-02-04 PROCEDURE — 94664 DEMO&/EVAL PT USE INHALER: CPT

## 2023-02-04 PROCEDURE — 94640 AIRWAY INHALATION TREATMENT: CPT

## 2023-02-04 PROCEDURE — 84484 ASSAY OF TROPONIN QUANT: CPT

## 2023-02-04 PROCEDURE — 6370000000 HC RX 637 (ALT 250 FOR IP): Performed by: FAMILY MEDICINE

## 2023-02-04 PROCEDURE — 2140000000 HC CCU INTERMEDIATE R&B

## 2023-02-04 PROCEDURE — 83880 ASSAY OF NATRIURETIC PEPTIDE: CPT

## 2023-02-04 PROCEDURE — 96374 THER/PROPH/DIAG INJ IV PUSH: CPT

## 2023-02-04 PROCEDURE — 6360000004 HC RX CONTRAST MEDICATION: Performed by: RADIOLOGY

## 2023-02-04 PROCEDURE — 80053 COMPREHEN METABOLIC PANEL: CPT

## 2023-02-04 PROCEDURE — 71275 CT ANGIOGRAPHY CHEST: CPT

## 2023-02-04 PROCEDURE — 94660 CPAP INITIATION&MGMT: CPT

## 2023-02-04 PROCEDURE — 93005 ELECTROCARDIOGRAM TRACING: CPT | Performed by: STUDENT IN AN ORGANIZED HEALTH CARE EDUCATION/TRAINING PROGRAM

## 2023-02-04 PROCEDURE — 99285 EMERGENCY DEPT VISIT HI MDM: CPT

## 2023-02-04 PROCEDURE — 6360000002 HC RX W HCPCS: Performed by: STUDENT IN AN ORGANIZED HEALTH CARE EDUCATION/TRAINING PROGRAM

## 2023-02-04 PROCEDURE — 85025 COMPLETE CBC W/AUTO DIFF WBC: CPT

## 2023-02-04 RX ORDER — BUDESONIDE 0.5 MG/2ML
0.5 INHALANT ORAL 2 TIMES DAILY
Status: DISCONTINUED | OUTPATIENT
Start: 2023-02-04 | End: 2023-02-08 | Stop reason: HOSPADM

## 2023-02-04 RX ORDER — POTASSIUM CHLORIDE 20 MEQ/1
20 TABLET, EXTENDED RELEASE ORAL
Status: DISCONTINUED | OUTPATIENT
Start: 2023-02-06 | End: 2023-02-08 | Stop reason: HOSPADM

## 2023-02-04 RX ORDER — FUROSEMIDE 10 MG/ML
40 INJECTION INTRAMUSCULAR; INTRAVENOUS ONCE
Status: COMPLETED | OUTPATIENT
Start: 2023-02-04 | End: 2023-02-04

## 2023-02-04 RX ORDER — METOPROLOL TARTRATE 100 MG/1
150 TABLET ORAL DAILY
Status: ON HOLD | COMMUNITY
End: 2023-02-08 | Stop reason: HOSPADM

## 2023-02-04 RX ORDER — METOPROLOL TARTRATE 50 MG/1
150 TABLET, FILM COATED ORAL DAILY
Status: DISCONTINUED | OUTPATIENT
Start: 2023-02-05 | End: 2023-02-07

## 2023-02-04 RX ORDER — OXYCODONE HYDROCHLORIDE AND ACETAMINOPHEN 5; 325 MG/1; MG/1
1 TABLET ORAL EVERY 6 HOURS PRN
Status: DISCONTINUED | OUTPATIENT
Start: 2023-02-04 | End: 2023-02-08 | Stop reason: HOSPADM

## 2023-02-04 RX ORDER — ARFORMOTEROL TARTRATE 15 UG/2ML
15 SOLUTION RESPIRATORY (INHALATION) 2 TIMES DAILY
Status: DISCONTINUED | OUTPATIENT
Start: 2023-02-04 | End: 2023-02-08 | Stop reason: HOSPADM

## 2023-02-04 RX ORDER — LEVOTHYROXINE SODIUM 0.03 MG/1
75 TABLET ORAL DAILY
Status: DISCONTINUED | OUTPATIENT
Start: 2023-02-05 | End: 2023-02-08 | Stop reason: HOSPADM

## 2023-02-04 RX ORDER — DESMOPRESSIN ACETATE 0.1 MG/1
200 TABLET ORAL 2 TIMES DAILY
Status: DISCONTINUED | OUTPATIENT
Start: 2023-02-04 | End: 2023-02-08 | Stop reason: HOSPADM

## 2023-02-04 RX ORDER — FAMOTIDINE 20 MG/1
20 TABLET, FILM COATED ORAL DAILY
Status: DISCONTINUED | OUTPATIENT
Start: 2023-02-05 | End: 2023-02-08 | Stop reason: HOSPADM

## 2023-02-04 RX ORDER — DOCUSATE SODIUM 100 MG/1
100 CAPSULE, LIQUID FILLED ORAL 2 TIMES DAILY
Status: DISCONTINUED | OUTPATIENT
Start: 2023-02-04 | End: 2023-02-08 | Stop reason: HOSPADM

## 2023-02-04 RX ORDER — FUROSEMIDE 40 MG/1
40 TABLET ORAL DAILY
Status: DISCONTINUED | OUTPATIENT
Start: 2023-02-05 | End: 2023-02-08 | Stop reason: HOSPADM

## 2023-02-04 RX ORDER — ESCITALOPRAM OXALATE 10 MG/1
10 TABLET ORAL DAILY
Status: DISCONTINUED | OUTPATIENT
Start: 2023-02-05 | End: 2023-02-08 | Stop reason: HOSPADM

## 2023-02-04 RX ORDER — HYDRALAZINE HYDROCHLORIDE 10 MG/1
10 TABLET, FILM COATED ORAL 2 TIMES DAILY
Status: DISCONTINUED | OUTPATIENT
Start: 2023-02-04 | End: 2023-02-08 | Stop reason: HOSPADM

## 2023-02-04 RX ORDER — OMEGA-3-ACID ETHYL ESTERS 1 G/1
1 CAPSULE, LIQUID FILLED ORAL 2 TIMES DAILY
Status: DISCONTINUED | OUTPATIENT
Start: 2023-02-04 | End: 2023-02-08 | Stop reason: HOSPADM

## 2023-02-04 RX ORDER — POTASSIUM CHLORIDE 1500 MG/1
20 TABLET, FILM COATED, EXTENDED RELEASE ORAL
COMMUNITY

## 2023-02-04 RX ORDER — IPRATROPIUM BROMIDE AND ALBUTEROL SULFATE 2.5; .5 MG/3ML; MG/3ML
3 SOLUTION RESPIRATORY (INHALATION)
Status: COMPLETED | OUTPATIENT
Start: 2023-02-04 | End: 2023-02-04

## 2023-02-04 RX ORDER — ECHINACEA PURPUREA EXTRACT 125 MG
1 TABLET ORAL PRN
COMMUNITY

## 2023-02-04 RX ORDER — PREDNISONE 20 MG/1
40 TABLET ORAL
Status: COMPLETED | OUTPATIENT
Start: 2023-02-04 | End: 2023-02-08

## 2023-02-04 RX ORDER — FERROUS SULFATE 325(65) MG
325 TABLET ORAL 2 TIMES DAILY
Status: DISCONTINUED | OUTPATIENT
Start: 2023-02-04 | End: 2023-02-08 | Stop reason: HOSPADM

## 2023-02-04 RX ADMIN — PREDNISONE 40 MG: 20 TABLET ORAL at 21:31

## 2023-02-04 RX ADMIN — FERROUS SULFATE TAB 325 MG (65 MG ELEMENTAL FE) 325 MG: 325 (65 FE) TAB at 21:32

## 2023-02-04 RX ADMIN — OXYCODONE AND ACETAMINOPHEN 1 TABLET: 5; 325 TABLET ORAL at 21:56

## 2023-02-04 RX ADMIN — IOPAMIDOL 75 ML: 755 INJECTION, SOLUTION INTRAVENOUS at 17:33

## 2023-02-04 RX ADMIN — DILTIAZEM HYDROCHLORIDE 30 MG: 30 TABLET, FILM COATED ORAL at 21:31

## 2023-02-04 RX ADMIN — OMEGA-3-ACID ETHYL ESTERS 1 G: 1 CAPSULE, LIQUID FILLED ORAL at 21:32

## 2023-02-04 RX ADMIN — FUROSEMIDE 40 MG: 10 INJECTION, SOLUTION INTRAMUSCULAR; INTRAVENOUS at 18:05

## 2023-02-04 RX ADMIN — IPRATROPIUM BROMIDE AND ALBUTEROL SULFATE 3 AMPULE: 2.5; .5 SOLUTION RESPIRATORY (INHALATION) at 15:07

## 2023-02-04 RX ADMIN — DESMOPRESSIN ACETATE 200 MCG: 0.1 TABLET ORAL at 21:31

## 2023-02-04 RX ADMIN — DOCUSATE SODIUM 100 MG: 100 CAPSULE, LIQUID FILLED ORAL at 21:32

## 2023-02-04 RX ADMIN — HYDRALAZINE HYDROCHLORIDE 10 MG: 10 TABLET, FILM COATED ORAL at 21:32

## 2023-02-04 ASSESSMENT — ENCOUNTER SYMPTOMS
ABDOMINAL DISTENTION: 0
COUGH: 0
SHORTNESS OF BREATH: 1
SORE THROAT: 0
WHEEZING: 0
DIARRHEA: 0
EYE REDNESS: 0
EYE DISCHARGE: 0
SINUS PRESSURE: 0
EYE PAIN: 0
BACK PAIN: 0
VOMITING: 0
NAUSEA: 0

## 2023-02-04 ASSESSMENT — PAIN DESCRIPTION - LOCATION
LOCATION: ABDOMEN
LOCATION: CHEST

## 2023-02-04 ASSESSMENT — PAIN DESCRIPTION - DESCRIPTORS: DESCRIPTORS: SHARP;DISCOMFORT

## 2023-02-04 ASSESSMENT — PAIN DESCRIPTION - FREQUENCY: FREQUENCY: CONTINUOUS

## 2023-02-04 ASSESSMENT — PAIN SCALES - GENERAL
PAINLEVEL_OUTOF10: 5
PAINLEVEL_OUTOF10: 0
PAINLEVEL_OUTOF10: 0
PAINLEVEL_OUTOF10: 7

## 2023-02-04 ASSESSMENT — PAIN - FUNCTIONAL ASSESSMENT: PAIN_FUNCTIONAL_ASSESSMENT: ACTIVITIES ARE NOT PREVENTED

## 2023-02-04 ASSESSMENT — PAIN DESCRIPTION - PAIN TYPE: TYPE: ACUTE PAIN

## 2023-02-04 ASSESSMENT — PAIN DESCRIPTION - ORIENTATION: ORIENTATION: LEFT;LOWER

## 2023-02-04 NOTE — ED PROVIDER NOTES
The history is provided by the patient and medical records. 81-year-old male present emerged for complaint chest pain shortness of breath symptoms going on for 3 days. Describes the chest pain is substernal in the middle of her chest does not radiate. Unable to explain the pain to me. Elicits that been more constant over the past several days. She does have a history of COPD as well wears 6 L nasal cannula at baseline. MS reports she was 87% on her home oxygen they placed her on CPAP and brought her into due to tachypnea and hypoxia. Patient [place] any fevers but had some slight nausea 3 days ago however none since. Denies any abdominal pain change in bowel bladder habits otherwise no other acute complaints at this time. She does have a history of atrial fibrillation and is not on her Eliquis for the past year for that due to bleeding. Review of Systems   Constitutional:  Negative for chills and fever. HENT:  Negative for ear pain, sinus pressure and sore throat. Eyes:  Negative for pain, discharge and redness. Respiratory:  Positive for shortness of breath. Negative for cough and wheezing. Cardiovascular:  Positive for chest pain. Gastrointestinal:  Negative for abdominal distention, diarrhea, nausea and vomiting. Genitourinary:  Negative for dysuria and frequency. Musculoskeletal:  Negative for arthralgias and back pain. Skin:  Negative for rash and wound. Neurological:  Negative for weakness and headaches. Hematological:  Negative for adenopathy. All other systems reviewed and are negative. Physical Exam  Vitals and nursing note reviewed. Constitutional:       Appearance: Normal appearance. She is normal weight. HENT:      Head: Normocephalic and atraumatic. Eyes:      Conjunctiva/sclera: Conjunctivae normal.   Cardiovascular:      Rate and Rhythm: Normal rate and regular rhythm. Pulses: Normal pulses. Heart sounds: Normal heart sounds.  No murmur heard.    No gallop. Pulmonary:      Effort: Tachypnea, accessory muscle usage and respiratory distress present. Breath sounds: Normal breath sounds. No wheezing or rales. Abdominal:      General: Abdomen is flat. Bowel sounds are normal. There is no distension. Palpations: Abdomen is soft. Tenderness: There is no abdominal tenderness. There is no guarding. Comments: Patient has chronic wound to her ventral hernia with some skin breakdown no obvious signs infection no erythema no pain to palpation no crepitus. Skin:     General: Skin is warm and dry. Capillary Refill: Capillary refill takes less than 2 seconds. Neurological:      General: No focal deficit present. Mental Status: She is alert and oriented to person, place, and time.         Procedures     MDM     Amount and/or Complexity of Data Reviewed  Clinical lab tests: reviewed  Tests in the radiology section of CPT®: reviewed  Tests in the medicine section of CPT®: reviewed  Decide to obtain previous medical records or to obtain history from someone other than the patient: yes       Diagnostic results    LABS:    Labs Reviewed   CBC WITH AUTO DIFFERENTIAL - Abnormal; Notable for the following components:       Result Value    Hemoglobin 10.6 (*)     MCH 25.4 (*)     MCHC 31.0 (*)     RDW 16.5 (*)     Lymphocytes % 9.2 (*)     Lymphocytes Absolute 0.50 (*)     All other components within normal limits   TROPONIN - Abnormal; Notable for the following components:    Troponin, High Sensitivity 17 (*)     All other components within normal limits   BRAIN NATRIURETIC PEPTIDE - Abnormal; Notable for the following components:    Pro-BNP 7,730 (*)     All other components within normal limits   COMPREHENSIVE METABOLIC PANEL W/ REFLEX TO MG FOR LOW K - Abnormal; Notable for the following components:    CO2 33 (*)     Glucose 115 (*)     Total Protein 6.3 (*)     Alkaline Phosphatase 125 (*)     ALT 58 (*)     AST 41 (*)     All other components within normal limits   BLOOD GAS, ARTERIAL - Abnormal; Notable for the following components:    PCO2 49.9 (*)     PO2 230.9 (*)     HCO3 30.4 (*)     B.E. 4.7 (*)     O2 Sat 99.6 (*)     O2Hb 98.4 (*)     All other components within normal limits   TROPONIN - Abnormal; Notable for the following components:    Troponin, High Sensitivity 18 (*)     All other components within normal limits   ARTERIAL BLOOD GAS, RESPIRATORY ONLY   See ED course    As interpreted by me, the above displayed labs are abnormal. All other labs obtained during this visit were within normal range or not returned as of this dictation. EKG Interpretation  Interpreted by emergency department physician, David Bhardwaj MD      See ED course        RADIOLOGY:   Non-plain film images such as CT, Ultrasound and MRI are read by the radiologist. Plain radiographic images are visualized and preliminarily interpreted by the ED Provider with the below findings:    See ED course    Interpretation per the Radiologist below, if available at the time of this note:    CTA PULMONARY W CONTRAST   Final Result   1. Signs of right-sided failure causing lack of proper opacification of the   pulmonary artery circulation including opacification of the more distal   subsegmental branches particular for the lower lobes with lack contrast   opacification of the pulmonary venous return. 2.  No sizable central pulmonary embolus seen but there are flow phenomena   causing solid filling defects in the central pulmonary arteries. See above   comments and recommendations. 3.  Chronic bilateral pleural effusions. 4.  Chronic bi basilar parenchymal passes and areas of scar in the lung   parenchyma. Cannot exclude component of chronic pneumonia or aspiration in   the left lower lobe. RECOMMENDATIONS:   Unavailable         XR CHEST PORTABLE   Final Result   1.  Mild cardiomegaly with findings of hazy bilateral airspace disease which   could represent CHF or possibly pneumonia in the proper clinical setting. XR CHEST PORTABLE    Result Date: 2/4/2023  EXAMINATION: ONE XRAY VIEW OF THE CHEST 2/4/2023 3:28 pm COMPARISON: 10/19/2022 and 09/30/2022 HISTORY: ORDERING SYSTEM PROVIDED HISTORY: sob hypoxia TECHNOLOGIST PROVIDED HISTORY: Reason for exam:->sob hypoxia What reading provider will be dictating this exam?->CRC FINDINGS: The heart is enlarged. Vague hazy bilateral airspace disease is noted. Differential includes mild CHF or possibly pneumonia in the proper clinical setting. There is chronic blunting the left costophrenic angle. 1. Mild cardiomegaly with findings of hazy bilateral airspace disease which could represent CHF or possibly pneumonia in the proper clinical setting. CTA PULMONARY W CONTRAST    Result Date: 2/4/2023  EXAMINATION: CTA OF THE CHEST 2/4/2023 5:09 pm TECHNIQUE: CTA of the chest was performed after the administration of intravenous contrast.  Multiplanar reformatted images are provided for review. MIP images are provided for review. Automated exposure control, iterative reconstruction, and/or weight based adjustment of the mA/kV was utilized to reduce the radiation dose to as low as reasonably achievable. COMPARISON: None. HISTORY: ORDERING SYSTEM PROVIDED HISTORY: pe TECHNOLOGIST PROVIDED HISTORY: Reason for exam:->pe Decision Support Exception - unselect if not a suspected or confirmed emergency medical condition->Emergency Medical Condition (MA) What reading provider will be dictating this exam?->CRC FINDINGS: Cardiac area appears enlarged. RV inner diameter: 4.8 cm. LV inner diameter: 3.8 cm. RV/LV ratio: 1.26, increased. Diameter for the main pulmonary artery: 4.3 cm. Diameter for the ascending aorta: 3.2 x 3.8 cm. There is a dominant enlargement for the right atrium. There was IV contrast reflux into the inferior vena cava, hepatic veins and coronary sinus.  There was slow flow through the pulmonary artery circulation with incomplete filling of more mid distal subsegmental branches particular towards the lower lobes, and signs of loss of uniform laminar flow in the central pulmonary arteries and their lobar branches causing pseudo filling defects in the central pulmonary arteries. These findings associated with increased RV/LV ratio, increased diameter of the main pulmonary artery indicate a physiology of right-sided failure/tricuspid insufficiency/pulmonary arterial hypertension. Correlation with clinical data is recommended. Proper opacification of the pulmonary artery circulation including opacification of more distal branches will required a longer time of delayed between beginning of contrast administration and acquisition of images, and also a more lengthy contrast administration as well. To the abnormal right heart physiology, there was no opacifications of the pulmonary venous return/left atrium/left ventricle/thoracic aorta. There is no aneurysm of the thoracic aorta. There is no pericardial effusion. Few lymph nodes are seen the mediastinum. No mediastinal mass or adenopathy seen. No hilar adenopathy. There are areas of peripheral residual scar in the right and left upper lobes, and in the right middle lobe. Small bilateral pleural effusions are present they were observed previously. There are peripheral subpleural areas of consolidation atelectasis in both lower lobes which were also observed previously possible slight more prominent in the left lower lobe which can be manifestation aspiration or bronchopneumonia. There is a also a mosaic pattern in the lung parenchyma in addition to emphysematous changes in the upper lobes. Upper abdominal structures are not fully covered on this study is visualized appear unremarkable. There is a right convex curvature for the thoracolumbar spine. Patient had previous vertebroplasty in lower thoracic spine segments.      1.  Signs of right-sided failure causing lack of proper opacification of the pulmonary artery circulation including opacification of the more distal subsegmental branches particular for the lower lobes with lack contrast opacification of the pulmonary venous return. 2.  No sizable central pulmonary embolus seen but there are flow phenomena causing solid filling defects in the central pulmonary arteries. See above comments and recommendations. 3.  Chronic bilateral pleural effusions. 4.  Chronic bi basilar parenchymal passes and areas of scar in the lung parenchyma. Cannot exclude component of chronic pneumonia or aspiration in the left lower lobe. RECOMMENDATIONS: Unavailable       No results found.     MDM    History From: Patient    CONSULTS: (Who and What was discussed)  IP CONSULT TO FAMILY MEDICINE      Social Determinants of Health : None    Chronic Conditions affecting care:    has a past medical history of A-fib ([address] Able to transfer from chair to wheelchair, Abnormal mammogram (2010), Acute on chronic respiratory failure ([address] (05/05/2017), Anemia, Anemia due to chronic illness, Ankle fracture, left (2008), 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[address] (03/26/2007), Backache, Benign hypertension, CHF (congestive heart failure) ([address] Chronic back pain, Chronic kidney disease, Chronic pain disorder, Chronic, continuous use of opioids, Compression fracture of thoracic vertebra ([address] COPD (chronic obstructive pulmonary disease) ([address] Debility, Deformity of ankle and foot, acquired (01/31/2011), Depression, Diabetes insipidus ([address] Diverticulitis, Dry eyes, Encephalopathy acute (05/05/2017), Gait disturbance, GERD (gastroesophageal reflux disease), Hemorrhoids (01/13/2012), Hernia, History of blood transfusion (approx 05/2017), History of Clostridium difficile, Hyperlipidemia, Hypothyroidism, Ischemic colitis, enteritis, or enterocolitis ([address] Long term (current) use of systemic steroids (7/10/2022), Long-term current use of steroids, Lumbar disc herniation, Myalgia and myositis, unspecified, Nephrosclerosis, Nonunion of fracture (02/22/2011), Osteoarthritis, PAF (paroxysmal atrial fibrillation) ([address] 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[address] Pulmonary hypertension ([address] Pulmonary sarcoidosis ([address] Rectal bleeding, Rhabdomyolysis (05/09/2011), Steroid-induced avascular necrosis of shoulder ([facility] [address] Tibia fracture (07/2010), and Ventral hernia without obstruction or gangrene (7/10/2022). Records Reviewed( Source) previous note reviewed from 12/27/2022 patient does have noted history of CHF hypertension hypothyroidism noted in the note. CC/HPI Summary, DDx, ED Course, and Reassessment:   Differential diagnosis including but not limited to CHF exacerbation, COPD exacerbation, pulmonary embolism  81-year-old male presenting complaint of shortness of breath does wear 6 L oxygen at home secondary to CHF and COPD. Patient was 87% on her home oxygen per EMS they put her on CPAP patient was transitioned to BiPAP on arrival to the emergency department. She was given DuoNeb treatments however does not have significant wheeze. She also has not been on her Eliquis for over a year is also noted on EKG to be in A. fib. However no signs of acute ST elevations noted. Due to this did get a CT of the patient's chest to rule out pulmonary embolism. Patient's chest x-ray is showing signs of CHF congestion. She was given 40 mg of IV Lasix. CTA showing no acute pulmonary embolism however does show right-sided heart failure. Patient's remain laboratory work does show elevated BNP. Repeat troponin pending at this time. Main laboratory work-up was stable. She will be admitted to the hospital.  Did speak with family medicine attending Dr. Johnson Shall  Will admission at this time. Patient otherwise hemodynamically stable currently.     Disposition Considerations (Tests not ordered but considered, Shared Decision Making, Pt Expectation of Test or Tx.): Upon shared decision making patient be admitted to the hospital at this time for acute hypoxic respiratory failure requiring BiPAP at this time secondary to her CHF acute on chronic exacerbation. Medications   ipratropium-albuterol (DUONEB) nebulizer solution 3 ampule (3 ampules Inhalation Given 2/4/23 1507)   iopamidol (ISOVUE-370) 76 % injection 75 mL (75 mLs IntraVENous Given 2/4/23 1733)   furosemide (LASIX) injection 40 mg (40 mg IntraVENous Given 2/4/23 1805)         I am the primary provider of record      ED Course as of 02/04/23 1856   Sat Feb 04, 2023 1557 CBC white counts elevated hemoglobin showing chronic anemia [CB]   1557 ABG showing no significant hypercapnia patient does have history of COPD CO2 is 49.9 [CB]   1626 BMP no electrolyte abnormalities, glucose 115  BNP slightly elevated from baseline of 7700, troponin 17 slight elevation will get repeat however around her baseline  Laboratory work-up interpreted by myself [CB]   [address] with Dr. Paras Richards who is agreeable to admission the patient at this time. [CB]   1853 EKG as interpreted myself  Rate 94  Atrial fibrillation  No acute ST ovation's or depressions atrial fibrillation stable intervals  No previous to compare [CB]   1854 Chest x-ray no obvious pneumothorax as interpreted myself on wet read otherwise agree with radiologist. [CB]      ED Course User Index  [CB] Thierry Saenz MD       ED Course as of 02/04/23 1856   Sat Feb 04, 2023   1557 CBC white counts elevated hemoglobin showing chronic anemia [CB]   1557 ABG showing no significant hypercapnia patient does have history of COPD CO2 is 49.9 [CB]   1626 BMP no electrolyte abnormalities, glucose 115  BNP slightly elevated from baseline of 7700, troponin 17 slight elevation will get repeat however around her baseline  Laboratory work-up interpreted by myself [CB]   [address] with Dr. Paras Richards who is agreeable to admission the patient at this time.  [CB]   1853 EKG as interpreted myself  Rate 94  Atrial fibrillation  No acute ST ovation's or depressions atrial fibrillation stable intervals  No previous to compare [CB]   1854 Chest x-ray no obvious pneumothorax as interpreted myself on wet read otherwise agree with radiologist. [CB]      ED Course User Index  [CB] Abena Shepherd MD       --------------------------------------------- PAST HISTORY ---------------------------------------------  Past Medical History:  has a past medical history of A-fib ([address] Able to transfer from chair to wheelchair, Abnormal mammogram, Acute on chronic respiratory failure ([address] Anemia, Anemia due to chronic illness, Ankle fracture, left, 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[address] Backache, Benign hypertension, CHF (congestive heart failure) ([address] Chronic back pain, Chronic kidney disease, Chronic pain disorder, Chronic, continuous use of opioids, Compression fracture of thoracic vertebra ([address] COPD (chronic obstructive pulmonary disease) ([address] Debility, Deformity of ankle and foot, acquired, Depression, Diabetes insipidus ([address] Diverticulitis, Dry eyes, Encephalopathy acute, Gait disturbance, GERD (gastroesophageal reflux disease), Hemorrhoids, Hernia, History of blood transfusion, History of Clostridium difficile, Hyperlipidemia, Hypothyroidism, Ischemic colitis, enteritis, or enterocolitis ([address] Long term (current) use of systemic steroids, Long-term current use of steroids, Lumbar disc herniation, Myalgia and myositis, unspecified, Nephrosclerosis, Nonunion of fracture, Osteoarthritis, PAF (paroxysmal atrial fibrillation) ([address] 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[address] Pulmonary hypertension ([address] Pulmonary sarcoidosis ([address] Rectal bleeding, Rhabdomyolysis, Steroid-induced avascular necrosis of shoulder ([address] Tibia fracture, and Ventral hernia without obstruction or gangrene. Past Surgical History:  has a past surgical history that includes fracture surgery (2010);  Kyphosis surgery; Lumbar disc surgery; Tibia / Adrien Conch lengthening; other surgical history (11/1/11); Abdomen surgery (2008); Echo Complete (4/22/2013); ECHO Compl W Dop Color Flow (8/16/2015); Upper gastrointestinal endoscopy (1/4/2016); Hemorrhoid surgery (12/08/2016); Colonoscopy (2008); Colonoscopy (04/11/2014); Colonoscopy (11/23/2015); Colonoscopy (10/24/2016); Colonoscopy (07/26/2017); Upper gastrointestinal endoscopy (07/26/2017); sigmoidoscopy (N/A, 3/19/2021); and other surgical history (12/14/2021). Social History:  reports that she quit smoking about 26 years ago. Her smoking use included cigarettes. She started smoking about 52 years ago. She has a 12.50 pack-year smoking history. She has never used smokeless tobacco. She reports that she does not currently use drugs. She reports that she does not drink alcohol. Family History: family history includes Alcohol Abuse in her sister; Arthritis in her father and mother; Diabetes in her father and mother; High Blood Pressure in her father, mother, and sister; Substance Abuse in her brother and sister. The patients home medications have been reviewed. Allergies: Patient has no known allergies.     -------------------------------------------------- RESULTS -------------------------------------------------    LABS:  Results for orders placed or performed during the hospital encounter of 02/04/23   CBC with Auto Differential   Result Value Ref Range    WBC 5.5 4.5 - 11.5 E9/L    RBC 4.18 3.50 - 5.50 E12/L    Hemoglobin 10.6 (L) 11.5 - 15.5 g/dL    Hematocrit 34.2 34.0 - 48.0 %    MCV 81.8 80.0 - 99.9 fL    MCH 25.4 (L) 26.0 - 35.0 pg    MCHC 31.0 (L) 32.0 - 34.5 %    RDW 16.5 (H) 11.5 - 15.0 fL    Platelets 046 116 - 721 E9/L    MPV 11.0 7.0 - 12.0 fL    Neutrophils % 78.2 43.0 - 80.0 %    Lymphocytes % 9.2 (L) 20.0 - 42.0 %    Monocytes % 5.9 2.0 - 12.0 %    Eosinophils % 5.9 0.0 - 6.0 %    Basophils % 0.8 0.0 - 2.0 %    Neutrophils Absolute 4.29 1.80 - 7.30 E9/L    Lymphocytes Absolute 0.50 (L) 1.50 - 4.00 E9/L    Monocytes Absolute 0.33 0.10 - 0.95 E9/L    Eosinophils Absolute 0.32 0.05 - 0.50 E9/L    Basophils Absolute 0.04 0.00 - 0.20 E9/L    nRBC 0.8 /100 WBC    Toxic Granulation 2+     Anisocytosis 1+     Hypochromia 1+     Poikilocytes 1+     Ovalocytes 2+    Troponin   Result Value Ref Range    Troponin, High Sensitivity 17 (H) 0 - 9 ng/L   Brain Natriuretic Peptide   Result Value Ref Range    Pro-BNP 7,730 (H) 0 - 125 pg/mL   Comprehensive Metabolic Panel w/ Reflex to MG   Result Value Ref Range    Sodium 138 132 - 146 mmol/L    Potassium reflex Magnesium 4.0 3.5 - 5.0 mmol/L    Chloride 98 98 - 107 mmol/L    CO2 33 (H) 22 - 29 mmol/L    Anion Gap 7 7 - 16 mmol/L    Glucose 115 (H) 74 - 99 mg/dL    BUN 18 6 - 23 mg/dL    Creatinine 1.0 0.5 - 1.0 mg/dL    Est, Glom Filt Rate >60 >=60 mL/min/1.73    Calcium 9.4 8.6 - 10.2 mg/dL    Total Protein 6.3 (L) 6.4 - 8.3 g/dL    Albumin 3.9 3.5 - 5.2 g/dL    Total Bilirubin 0.5 0.0 - 1.2 mg/dL    Alkaline Phosphatase 125 (H) 35 - 104 U/L    ALT 58 (H) 0 - 32 U/L    AST 41 (H) 0 - 31 U/L   Blood Gas, Arterial   Result Value Ref Range    Date Analyzed [id number]     Time Analyzed 1537     Source: Blood Arterial     pH, Blood Gas 7.402 7.350 - 7.450    PCO2 49.9 (H) 35.0 - 45.0 mmHg    PO2 230.9 (H) 75.0 - 100.0 mmHg    HCO3 30.4 (H) 22.0 - 26.0 mmol/L    B.E. 4.7 (H) -3.0 - 3.0 mmol/L    O2 Sat 99.6 (H) 92.0 - 98.5 %    PO2/FIO2 3.30 mmHg/%    O2Hb 98.4 (H) 94.0 - 97.0 %    COHb 0.9 0.0 - 1.5 %    MetHb 0.3 0.0 - 1.5 %    O2 Content 17.0 mL/dL    HHb 0.4 0.0 - 5.0 %    tHb (est) 11.9 11.5 - 16.5 g/dL    Mode NIV PAP     FIO2 70.0 %    Rr Mechanical 16.0 b/min    Peep/Cpap 6.0 cmH2O    PS 12 cmH20    Date Of Collection      Time Collected      Pt Temp 37.0 C     ID [id number]     Lab [id number]     Critical(s) Notified .  No Critical Values    EKG 12 Lead   Result Value Ref Range    Ventricular Rate 94 BPM    Atrial Rate 182 BPM    QRS Duration 74 ms    Q-T Interval 322 ms QTc Calculation (Bazett) 402 ms    R Axis 83 degrees    T Axis -78 degrees       RADIOLOGY:  CTA PULMONARY W CONTRAST   Final Result   1. Signs of right-sided failure causing lack of proper opacification of the   pulmonary artery circulation including opacification of the more distal   subsegmental branches particular for the lower lobes with lack contrast   opacification of the pulmonary venous return. 2.  No sizable central pulmonary embolus seen but there are flow phenomena   causing solid filling defects in the central pulmonary arteries. See above   comments and recommendations. 3.  Chronic bilateral pleural effusions. 4.  Chronic bi basilar parenchymal passes and areas of scar in the lung   parenchyma. Cannot exclude component of chronic pneumonia or aspiration in   the left lower lobe. RECOMMENDATIONS:   Unavailable         XR CHEST PORTABLE   Final Result   1. Mild cardiomegaly with findings of hazy bilateral airspace disease which   could represent CHF or possibly pneumonia in the proper clinical setting.               ------------------------- NURSING NOTES AND VITALS REVIEWED ---------------------------  Date / Time Roomed:  2/4/2023  2:51 PM  ED Bed Assignment:  20/20    The nursing notes within the ED encounter and vital signs as below have been reviewed.      Patient Vitals for the past 24 hrs:   BP Pulse Resp SpO2 Height Weight   02/04/23 1800 (!) 140/105 81 20 97 % -- --   02/04/23 1730 (!) 131/96 83 23 97 % -- --   02/04/23 1700 -- 79 24 -- -- --   02/04/23 1645 -- 83 23 -- -- --   02/04/23 1630 123/86 91 20 96 % -- --   02/04/23 1615 (!) 142/89 88 24 95 % -- --   02/04/23 1604 -- -- 23 -- -- --   02/04/23 1600 -- 91 17 100 % -- --   02/04/23 1545 -- 86 23 100 % -- --   02/04/23 1530 -- 95 23 100 % -- --   02/04/23 1515 -- 93 18 100 % -- --   02/04/23 1502 -- 97 18 -- -- --   02/04/23 1501 -- (!) 102 26 -- -- --   02/04/23 1500 -- (!) 103 14 -- -- --   02/04/23 1459 (!) 128/95 93 20 98 % 5' (1.524 m) 150 lb (68 kg)       Oxygen Saturation Interpretation: on bipap    ------------------------------------------ PROGRESS NOTES ------------------------------------------  Re-evaluation(s):  Time: 1800  Patients symptoms show no change  Repeat physical examination is not changed    Counseling:  I have spoken with the patient and discussed todays results, in addition to providing specific details for the plan of care and counseling regarding the diagnosis and prognosis. Their questions are answered at this time and they are agreeable with the plan of admission.    --------------------------------- ADDITIONAL PROVIDER NOTES ---------------------------------  Consultations:   Spoke with Dr. Mariajose Kennedy. Discussed case. They will admit the patient. This patient's ED course included: a personal history and physicial examination, re-evaluation prior to disposition, multiple bedside re-evaluations, IV medications, cardiac monitoring, continuous pulse oximetry, and complex medical decision making and emergency management    This patient has remained hemodynamically stable during their ED course. Please note that the withdrawal or failure to initiate urgent interventions for this patient would likely result in a life threatening deterioration or permanent disability. Systems at risk for deterioration include: pulm and cardic    Accordingly this patient received 31 minutes of critical care time, excluding separately billable procedures. Diagnosis:  1. Acute congestive heart failure, unspecified heart failure type ([address]    2. Acute respiratory failure with hypoxia (HCC)        Disposition:  Patient's disposition: Admit to telemetry  Patient's condition is stable.          Colonel Branden MD  Resident  02/04/23 [id number]

## 2023-02-04 NOTE — H&P
[address] [facility]  Department of Family Medicine  Family Medicine Residency Program  History and Physical    Patient:  Farrel Meigs 78 y.o. female MRN: [id number]     Date of Service: 2/4/2023      Chief complaint:   Chief Complaint   Patient presents with    Shortness of Breath     From home, SOB and CP worsening today. Placed on CPAP PTA by EMS. A&O x 3, able to speak full sentences. Placed on BIPAP by RT upon arrival to ED. Chest Pain        History of Present Illness   The patient is a 78 y.o. female with PMH of COPD chronically on 4 to 5 L of O2, sarcoidosis, CHF, pulmonary hypertension, A. fib, chronic pain with chronic opioid use who presented to the ER for worsening shortness of breath and concern for COPD/CHF exacerbation. Patient states that her symptoms started a few days ago. Due to her underlying sarcoidosis, patient already states that she is having difficulty breathing which is much worse than before. She states that currently she is getting short of breath after a short walk from her bed to her bathroom; in the same room. She states most recently over the course of the last few days, she has noticed that her shortness of breath is worsening to the point where she was requiring anywhere between 8 to 10 L of O2, and requires CPAP more often. She notes that it is taking her longer to recover after short periods of ambulation. Notes that she was monitoring herself at home, though decided to come to the ER after experiencing chest pressure for the last 2 days. She denies any radiation of chest pain/pressure down her left or right arms, denies any worsening abdominal pain, any headaches, dizziness, nausea/vomiting or worsening peripheral neuropathy. Patient lives with her , presently denying any sick contacts at home.       ED Course:   Patient remained hemodynamically stable, saturating well on BiPAP    Pertinent labs:  proBNP 7730 (patient normally seems to trend between 7127-7353)  Troponin 17, 18; delta negative    Pertinent imaging:    Chest x-ray: Mild cardiomegaly with findings of hazy bilateral airspace disease concerning for pneumonia vs CHF    CTA pulm: Tender right-sided heart failure causing lack of proper opacification, no acute central pulmonary emboli noted, chronic bilateral pleural effusions and chronic bibasilar parenchymal passes and areas of scarring    Pertinent medications  Given 40 mg Lasix IV x1 dose, duo nebs x3 ampoules    Patient admitted for concerns of CHF and COPD exacerbation    Past Medical History:       Diagnosis Date    A-fib [facility])     follows with Dr Supa Purvis to transfer from chair to wheelchair     with assistance    Abnormal mammogram 2010    Acute on chronic respiratory failure ([address] 05/05/2017    Anemia     Anemia due to chronic illness     Ankle fracture, left 2008    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[address] 03/26/2007    Backache     Benign hypertension     CHF (congestive heart failure) (HCC)     Chronic back pain     Chronic kidney disease     stage 3    Chronic pain disorder     Chronic, continuous use of opioids     Compression fracture of thoracic vertebra (HCC)     T10    COPD (chronic obstructive pulmonary disease) ([address]     Debility     Deformity of ankle and foot, acquired 01/31/2011    Depression     Diabetes insipidus ([address]     Diverticulitis     Dry eyes     Encephalopathy acute 05/05/2017    Gait disturbance     GERD (gastroesophageal reflux disease)     Hemorrhoids 01/13/2012    Hernia     History of blood transfusion approx 05/2017    History of Clostridium difficile     negative culture 12-15-15; resolved    Hyperlipidemia     Hypothyroidism     Ischemic colitis, enteritis, or enterocolitis ([address]     Long term (current) use of systemic steroids 7/10/2022    Long-term current use of steroids     Lumbar disc herniation     Myalgia and myositis, unspecified     Nephrosclerosis     Nonunion of fracture 02/22/2011 Osteoarthritis     severe    PAF (paroxysmal atrial fibrillation) (HCC)     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HCC)     PCWP mean:26.0 PA mean: 24.00; denies any recent issues    Pulmonary hypertension (HCC)     ventricular diastolic function consistent with abnormal relaxation (stage I)    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, f/u w/ PCP    Rectal bleeding     Rhabdomyolysis 05/09/2011    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 fx T10    LUMBAR DISC SURGERY      OTHER SURGICAL HISTORY  11/1/11    removal r leg external fixator    OTHER SURGICAL HISTORY  12/14/2021    Partial Vaginectomy, Endometrial Biopsy    SIGMOIDOSCOPY N/A 3/19/2021    SIGMOIDOSCOPY HEMORRHOID BANDING performed by Sergio Yang MD at [address] [place] [address] / [facility]      application TSF  5/6/90    UPPER GASTROINTESTINAL ENDOSCOPY  1/4/2016    UPPER GASTROINTESTINAL ENDOSCOPY  07/26/2017       Medications Prior to Admission:    Prior to Admission medications    Medication Sig Start Date End Date Taking?  Authorizing Provider   apixaban (ELIQUIS) 5 MG TABS tablet Take 5 mg by mouth 2 times daily   Yes Historical Provider, MD   metoprolol (LOPRESSOR) 100 MG tablet Take 150 mg by mouth daily   Yes Historical Provider, MD   potassium chloride (KLOR-CON M) 20 MEQ TBCR extended release tablet Take 20 mEq by mouth three times a week   Yes Historical Provider, MD   sodium chloride (OCEAN) 0.65 % nasal spray 1 spray by Nasal route as needed for Congestion   Yes Historical Provider, MD   promethazine-codeine (PHENERGAN WITH CODEINE) 6.25-10 MG/5ML syrup Take 5 mLs by mouth 4 times daily as needed for Cough for up to 60 days. 1/9/23 3/10/23  Bryan Skelton MD   oxyCODONE-acetaminophen (PERCOCET) 5-325 MG per tablet Take 1 tablet by mouth every 4 hours as needed for Pain for up to 30 days. 1/9/23 2/8/23  Bryan Skelton MD   naloxone [facility]) 4 MG/0.1ML LIQD nasal spray 1 spray by Nasal route as needed for Opioid Reversal 12/26/22   Bryan Skelton MD   docusate sodium (COLACE) 100 MG capsule Take 1 capsule by mouth 2 times daily 12/16/22   Bryan Skelton MD   furosemide (LASIX) 40 MG tablet Take 1 tablet by mouth daily 10/1/22 12/30/22  Steven Aceves MD   famotidine (PEPCID) 20 MG tablet Take 1 tablet by mouth daily 9/16/22   Ruben Morrison MD   escitalopram (LEXAPRO) 10 MG tablet Take 1 tablet by mouth daily 9/16/22   Ruben Morrison MD   metoprolol succinate (TOPROL XL) 50 MG extended release tablet Take 50 mg by mouth daily Given with Toprol xl 100 mg total dose 150 mg    Historical Provider, MD   omega-3 acid ethyl esters (LOVAZA) 1 g capsule Take 1 capsule by mouth in the morning and 1 capsule before bedtime. 8/16/22   Bryan Skelton MD   OXYGEN Inhale 2 L/min into the lungs as needed (shortness of breath, low oxygen <90%) BMS  Patient taking differently: Inhale 6 L/min into the lungs as needed (shortness of breath, low oxygen <90%) BMS 7/18/22   FRIDA Tyosn - CNP   dilTIAZem (CARDIZEM) 30 MG tablet Take 1 tablet by mouth in the morning and 1 tablet at noon and 1 tablet before bedtime.  7/18/22   Fern Paz MD   levothyroxine (SYNTHROID) 75 MCG tablet Take 1 tablet by mouth daily 7/8/22   Bryan Skelton MD   ferrous sulfate (IRON 325) 325 (65 Fe) MG tablet Take 1 tablet by mouth 2 times daily 7/8/22   Bryan Skelton MD predniSONE (DELTASONE) 5 MG tablet Take 1 tablet by mouth daily 7/8/22   Onur Gold MD   cycloSPORINE (RESTASIS) 0.05 % ophthalmic emulsion Place 1 drop into both eyes every 12 hours 7/8/22   Onur Gold MD   desmopressin (DDAVP) 0.1 mg tablet Take 2 tablets by mouth 2 times daily 6/13/22 12/16/22  Onur Gold MD   albuterol sulfate HFA (PROVENTIL HFA) 108 (90 Base) MCG/ACT inhaler Inhale 2 puffs into the lungs every 6 hours as needed for Wheezing or Shortness of Breath 5/17/22   Onur Gold MD   BREO ELLIPTA 100-25 MCG/INH AEPB inhaler Inhale 1 puff into the lungs daily 3/17/22   Onur Gold MD   albuterol (PROVENTIL) (2.5 MG/3ML) 0.083% nebulizer solution Take 3 mLs by nebulization every 6 hours as needed for Wheezing or Shortness of Breath 2/19/22   Marisel Bateman,    mupirocin Creig Rather) 2 % ointment Apply topically daily 6/8/20   Amanda Hernandez MD       Allergies:  Patient has no known allergies. Family History:       Problem Relation Age of Onset    Diabetes Mother     Arthritis Mother     High Blood Pressure Mother     Arthritis Father     High Blood Pressure Father     Diabetes Father     High Blood Pressure Sister     Alcohol Abuse Sister     Substance Abuse Brother     Substance Abuse Sister     Coronary Art Dis Neg Hx     Breast Cancer Neg Hx     Ovarian Cancer Neg Hx        Social History:   TOBACCO:   reports that she quit smoking about 26 years ago. Her smoking use included cigarettes. She started smoking about 52 years ago. She has a 12.50 pack-year smoking history. She has never used smokeless tobacco.  ETOH:   reports no history of alcohol use. OCCUPATION:      REVIEW OF SYSTEMS:  Constitutional: No fever, no chill or change in weight; good appetite  HEENT: No blurred vision, no ear problems, no sore throat, no running nose.   Respiratory: Chronic cough, no sputum, no pleuritic chest pain, + shortness of breath  Cardiology: No angina, dyspnea on exertion, no paroxysmal nocturnal dyspnea, no worsening orthopnea (she requires 3 pillows at baseline), no palpitation, no leg swelling. Gastroenterology: No dysphagia, no reflux; no abdominal pain, no nausea or vomiting; no constipation or diarrhea. No blood in stool. Genitourinary: No dysuria, no frequency, hesitancy; no hematuria  Musculoskeletal: no joint pain, no myalgia, no change in range of movement  Neurology: no focal weakness in extremities, no slurred speech, no double vision, no tingling or numbness sensation. Endocrinology: no temperature intolerance, no polyphagia, polydipsia or polyuria  Hematology: no increased bleeding, no bruising, no lymphadenopathy  Skin: no skin change noticed by patient  Psychology: no depressed mood, no suicidal ideation    Physical Exam   Vitals: BP (!) 140/105   Pulse 81   Resp 20   Ht 5' (1.524 m)   Wt 150 lb (68 kg)   LMP  (LMP Unknown)   SpO2 97%   BMI 29.29 kg/m²   General Appearance: Alert, cooperative, no acute distress, tolerating BiPAP  HEENT: Normocephalic, atraumatic. ANDRÉS, conjunctiva/corneas clear, EOM's intact, no pallor  or icterus. Neck: Supple, symmetrical, trachea midline, no cervical LAD. No thyroid enlargement/tenderness/nodules. No carotid bruit or JVD  Chest wall: No tenderness or deformity, full & symmetric excursion  Lung: Bilateral lower lobe crackles noted, worsened on the left versus the right, no wheezing, patient on BiPAP  Heart: Irregularly irregular rhythm consistent with A. fib no murmur, rub or   gallop. no bruits (carotid/femoral/abd), pedal pulses 2+,  no edema  Abdomen: Soft, non-tender, chronic abdominal wound noted, no drainage, bowel sounds active all four quadrants, no organomegaly  Genital and rectal exam: deferred  Extremities:  Extremities normal, atraumatic, no cyanosis or edema. Pulses equal bilaterally  Skin: Skin color, texture, turgor normal, no rashes or lesions  Musculokeletal: No joint swelling, no muscle tenderness.  ROM normal in all joints of extremities. Lymph nodes: no lymph node enlargement apprciated  Neurologic:   Alert&Oriented. CNII-XII intact.    Normal and symmetric  strength in UEs and LEs,     Labs and Imaging Studies   Basic Labs  Results for orders placed or performed during the hospital encounter of 02/04/23   CBC with Auto Differential   Result Value Ref Range    WBC 5.5 4.5 - 11.5 E9/L    RBC 4.18 3.50 - 5.50 E12/L    Hemoglobin 10.6 (L) 11.5 - 15.5 g/dL    Hematocrit 34.2 34.0 - 48.0 %    MCV 81.8 80.0 - 99.9 fL    MCH 25.4 (L) 26.0 - 35.0 pg    MCHC 31.0 (L) 32.0 - 34.5 %    RDW 16.5 (H) 11.5 - 15.0 fL    Platelets 935 152 - 180 E9/L    MPV 11.0 7.0 - 12.0 fL    Neutrophils % 78.2 43.0 - 80.0 %    Lymphocytes % 9.2 (L) 20.0 - 42.0 %    Monocytes % 5.9 2.0 - 12.0 %    Eosinophils % 5.9 0.0 - 6.0 %    Basophils % 0.8 0.0 - 2.0 %    Neutrophils Absolute 4.29 1.80 - 7.30 E9/L    Lymphocytes Absolute 0.50 (L) 1.50 - 4.00 E9/L    Monocytes Absolute 0.33 0.10 - 0.95 E9/L    Eosinophils Absolute 0.32 0.05 - 0.50 E9/L    Basophils Absolute 0.04 0.00 - 0.20 E9/L    nRBC 0.8 /100 WBC    Toxic Granulation 2+     Anisocytosis 1+     Hypochromia 1+     Poikilocytes 1+     Ovalocytes 2+    Troponin   Result Value Ref Range    Troponin, High Sensitivity 17 (H) 0 - 9 ng/L   Brain Natriuretic Peptide   Result Value Ref Range    Pro-BNP 7,730 (H) 0 - 125 pg/mL   Comprehensive Metabolic Panel w/ Reflex to MG   Result Value Ref Range    Sodium 138 132 - 146 mmol/L    Potassium reflex Magnesium 4.0 3.5 - 5.0 mmol/L    Chloride 98 98 - 107 mmol/L    CO2 33 (H) 22 - 29 mmol/L    Anion Gap 7 7 - 16 mmol/L    Glucose 115 (H) 74 - 99 mg/dL    BUN 18 6 - 23 mg/dL    Creatinine 1.0 0.5 - 1.0 mg/dL    Est, Glom Filt Rate >60 >=60 mL/min/1.73    Calcium 9.4 8.6 - 10.2 mg/dL    Total Protein 6.3 (L) 6.4 - 8.3 g/dL    Albumin 3.9 3.5 - 5.2 g/dL    Total Bilirubin 0.5 0.0 - 1.2 mg/dL    Alkaline Phosphatase 125 (H) 35 - 104 U/L    ALT 58 (H) 0 - 32 U/L    AST 41 (H) 0 - 31 U/L   Blood Gas, Arterial   Result Value Ref Range    Date Analyzed [id number]     Time Analyzed 1537     Source: Blood Arterial     pH, Blood Gas 7.402 7.350 - 7.450    PCO2 49.9 (H) 35.0 - 45.0 mmHg    PO2 230.9 (H) 75.0 - 100.0 mmHg    HCO3 30.4 (H) 22.0 - 26.0 mmol/L    B.E. 4.7 (H) -3.0 - 3.0 mmol/L    O2 Sat 99.6 (H) 92.0 - 98.5 %    PO2/FIO2 3.30 mmHg/%    O2Hb 98.4 (H) 94.0 - 97.0 %    COHb 0.9 0.0 - 1.5 %    MetHb 0.3 0.0 - 1.5 %    O2 Content 17.0 mL/dL    HHb 0.4 0.0 - 5.0 %    tHb (est) 11.9 11.5 - 16.5 g/dL    Mode NIV PAP     FIO2 70.0 %    Rr Mechanical 16.0 b/min    Peep/Cpap 6.0 cmH2O    PS 12 cmH20    Date Of Collection      Time Collected      Pt Temp 37.0 C     ID [id number]     Lab [id number]     Critical(s) Notified . No Critical Values    Troponin   Result Value Ref Range    Troponin, High Sensitivity 18 (H) 0 - 9 ng/L   EKG 12 Lead   Result Value Ref Range    Ventricular Rate 94 BPM    Atrial Rate 182 BPM    QRS Duration 74 ms    Q-T Interval 322 ms    QTc Calculation (Bazett) 402 ms    R Axis 83 degrees    T Axis -78 degrees       Imaging Studies:  Radiology:   CTA PULMONARY W CONTRAST   Final Result   1. Signs of right-sided failure causing lack of proper opacification of the   pulmonary artery circulation including opacification of the more distal   subsegmental branches particular for the lower lobes with lack contrast   opacification of the pulmonary venous return. 2.  No sizable central pulmonary embolus seen but there are flow phenomena   causing solid filling defects in the central pulmonary arteries. See above   comments and recommendations. 3.  Chronic bilateral pleural effusions. 4.  Chronic bi basilar parenchymal passes and areas of scar in the lung   parenchyma. Cannot exclude component of chronic pneumonia or aspiration in   the left lower lobe. RECOMMENDATIONS:   Unavailable         XR CHEST PORTABLE   Final Result   1.  Mild cardiomegaly with findings of hazy bilateral airspace disease which   could represent CHF or possibly pneumonia in the proper clinical setting. EKG: Rhythm Strip: atrial fibrillation, rate 94, unchanged from previous tracings. Resident's Assessment and PLan     Concern for CHF exacerbation  Hx HFpEF  Last echo with ejection fraction greater than 60%  proBNP elevated greater than 7000 this presentation, patient's baseline anywhere between 0896-9575  Patient has a history of noncompliance/nonadherence with medications  Supposed to be taking Lasix 40 mg daily at home  Patient does follow nephrology outpatient  Continue with Lasix 40 mg daily  Strict input output  Daily weights  Repeat chest x-ray in 1 to 2 days  CHF nurse consult  Monitor kidney function    Concern for COPD exacerbation  Patient has not smoked in the past 23 years  She has history of sarcoidosis, which may be worsening? Will trial 40 mg prednisone daily for the next 5 days  Question pneumonia at this time, as patient is afebrile, with no white count  Consider starting ABX, if patient not improving  Obtain sputum culture and Gram stain  Consider pulm consult if not improving  Continue Pulmicort, Brovana and Pep flutter  Continue BiPAP, monitor O2 requirements, wean down to baseline as appropriate    Hx A.  Fib  Patient does not tolerate Eliquis well  Continue with Lovenox for now  Lopressor 150 mg daily for rate control, continue Cardizem 30 mg 3 times daily    History of sarcoidosis  Patient has not had any recent follow-up with pulm  Last visit 8/11/2022; at that time patient was recommended to get PFTs ; revealing both restrictive and obstructive disease  Patient completed sleep study as appropriate    Hx diabetes insipidus  Continue home dose desmopressin    Hx hypothyroidism  Continue home dose levothyroxine 75 mcg daily    Hx GERD  Continue home dose Pepcid    Hx depression  Continue home dose Lexapro 10 mg daily    History of chronic pain with chronic opioid use  Continue to wean down opioids  Percocet every 6 to 8 hours as needed    DVT / GI prophylaxis: lovenox 40mg SC and PC and Pepcid    Electronically signed by Matteo Gomez MD on 2/4/2023 at 6:55 PM  This case was discussed with attending physician: Dr. Misty Camarillo

## 2023-02-05 LAB
ANION GAP SERPL CALCULATED.3IONS-SCNC: 9 MMOL/L (ref 7–16)
ANISOCYTOSIS: ABNORMAL
BASOPHILS ABSOLUTE: 0.01 E9/L (ref 0–0.2)
BASOPHILS RELATIVE PERCENT: 0.3 % (ref 0–2)
BUN BLDV-MCNC: 21 MG/DL (ref 6–23)
CALCIUM SERPL-MCNC: 9.2 MG/DL (ref 8.6–10.2)
CHLORIDE BLD-SCNC: 100 MMOL/L (ref 98–107)
CO2: 32 MMOL/L (ref 22–29)
CREAT SERPL-MCNC: 0.9 MG/DL (ref 0.5–1)
EOSINOPHILS ABSOLUTE: 0 E9/L (ref 0.05–0.5)
EOSINOPHILS RELATIVE PERCENT: 0 % (ref 0–6)
GFR SERPL CREATININE-BSD FRML MDRD: >60 ML/MIN/1.73
GLUCOSE BLD-MCNC: 123 MG/DL (ref 74–99)
HCT VFR BLD CALC: 35 % (ref 34–48)
HEMOGLOBIN: 10.8 G/DL (ref 11.5–15.5)
HYPOCHROMIA: ABNORMAL
IMMATURE GRANULOCYTES #: 0.01 E9/L
IMMATURE GRANULOCYTES %: 0.3 % (ref 0–5)
LYMPHOCYTES ABSOLUTE: 0.27 E9/L (ref 1.5–4)
LYMPHOCYTES RELATIVE PERCENT: 7.2 % (ref 20–42)
MCH RBC QN AUTO: 26 PG (ref 26–35)
MCHC RBC AUTO-ENTMCNC: 30.9 % (ref 32–34.5)
MCV RBC AUTO: 84.1 FL (ref 80–99.9)
MONOCYTES ABSOLUTE: 0.09 E9/L (ref 0.1–0.95)
MONOCYTES RELATIVE PERCENT: 2.4 % (ref 2–12)
NEUTROPHILS ABSOLUTE: 3.37 E9/L (ref 1.8–7.3)
NEUTROPHILS RELATIVE PERCENT: 89.8 % (ref 43–80)
OVALOCYTES: ABNORMAL
PDW BLD-RTO: 16.3 FL (ref 11.5–15)
PLATELET # BLD: 132 E9/L (ref 130–450)
PMV BLD AUTO: 11.1 FL (ref 7–12)
POIKILOCYTES: ABNORMAL
POLYCHROMASIA: ABNORMAL
POTASSIUM SERPL-SCNC: 4.3 MMOL/L (ref 3.5–5)
PRO-BNP: 6204 PG/ML (ref 0–125)
PROCALCITONIN: 0.06 NG/ML (ref 0–0.08)
RBC # BLD: 4.16 E12/L (ref 3.5–5.5)
SODIUM BLD-SCNC: 141 MMOL/L (ref 132–146)
TARGET CELLS: ABNORMAL
WBC # BLD: 3.8 E9/L (ref 4.5–11.5)

## 2023-02-05 PROCEDURE — 93005 ELECTROCARDIOGRAM TRACING: CPT | Performed by: FAMILY MEDICINE

## 2023-02-05 PROCEDURE — 2140000000 HC CCU INTERMEDIATE R&B

## 2023-02-05 PROCEDURE — 2700000000 HC OXYGEN THERAPY PER DAY

## 2023-02-05 PROCEDURE — 6370000000 HC RX 637 (ALT 250 FOR IP): Performed by: FAMILY MEDICINE

## 2023-02-05 PROCEDURE — 99222 1ST HOSP IP/OBS MODERATE 55: CPT | Performed by: FAMILY MEDICINE

## 2023-02-05 PROCEDURE — 94640 AIRWAY INHALATION TREATMENT: CPT

## 2023-02-05 PROCEDURE — 80048 BASIC METABOLIC PNL TOTAL CA: CPT

## 2023-02-05 PROCEDURE — 83880 ASSAY OF NATRIURETIC PEPTIDE: CPT

## 2023-02-05 PROCEDURE — 6360000002 HC RX W HCPCS: Performed by: FAMILY MEDICINE

## 2023-02-05 PROCEDURE — 94660 CPAP INITIATION&MGMT: CPT

## 2023-02-05 PROCEDURE — 6370000000 HC RX 637 (ALT 250 FOR IP)

## 2023-02-05 PROCEDURE — 36415 COLL VENOUS BLD VENIPUNCTURE: CPT

## 2023-02-05 PROCEDURE — 85025 COMPLETE CBC W/AUTO DIFF WBC: CPT

## 2023-02-05 PROCEDURE — 84145 PROCALCITONIN (PCT): CPT

## 2023-02-05 RX ORDER — GUAIFENESIN/DEXTROMETHORPHAN 100-10MG/5
5 SYRUP ORAL EVERY 6 HOURS PRN
Status: DISCONTINUED | OUTPATIENT
Start: 2023-02-05 | End: 2023-02-08 | Stop reason: HOSPADM

## 2023-02-05 RX ORDER — ENOXAPARIN SODIUM 100 MG/ML
40 INJECTION SUBCUTANEOUS DAILY
Status: DISCONTINUED | OUTPATIENT
Start: 2023-02-05 | End: 2023-02-08 | Stop reason: HOSPADM

## 2023-02-05 RX ADMIN — ESCITALOPRAM 10 MG: 10 TABLET, FILM COATED ORAL at 09:48

## 2023-02-05 RX ADMIN — ARFORMOTEROL TARTRATE 15 MCG: 15 SOLUTION RESPIRATORY (INHALATION) at 20:48

## 2023-02-05 RX ADMIN — GUAIFENESIN AND DEXTROMETHORPHAN 5 ML: 100; 10 SYRUP ORAL at 19:02

## 2023-02-05 RX ADMIN — DILTIAZEM HYDROCHLORIDE 30 MG: 30 TABLET, FILM COATED ORAL at 13:14

## 2023-02-05 RX ADMIN — LEVOTHYROXINE SODIUM 75 MCG: 0.03 TABLET ORAL at 09:51

## 2023-02-05 RX ADMIN — DILTIAZEM HYDROCHLORIDE 30 MG: 30 TABLET, FILM COATED ORAL at 20:17

## 2023-02-05 RX ADMIN — OMEGA-3-ACID ETHYL ESTERS 1 G: 1 CAPSULE, LIQUID FILLED ORAL at 20:18

## 2023-02-05 RX ADMIN — OXYCODONE AND ACETAMINOPHEN 1 TABLET: 5; 325 TABLET ORAL at 20:18

## 2023-02-05 RX ADMIN — ENOXAPARIN SODIUM 40 MG: 100 INJECTION SUBCUTANEOUS at 09:45

## 2023-02-05 RX ADMIN — DOCUSATE SODIUM 100 MG: 100 CAPSULE, LIQUID FILLED ORAL at 20:14

## 2023-02-05 RX ADMIN — FERROUS SULFATE TAB 325 MG (65 MG ELEMENTAL FE) 325 MG: 325 (65 FE) TAB at 20:14

## 2023-02-05 RX ADMIN — PREDNISONE 40 MG: 20 TABLET ORAL at 13:14

## 2023-02-05 RX ADMIN — DILTIAZEM HYDROCHLORIDE 30 MG: 30 TABLET, FILM COATED ORAL at 09:46

## 2023-02-05 RX ADMIN — HYDRALAZINE HYDROCHLORIDE 10 MG: 10 TABLET, FILM COATED ORAL at 20:14

## 2023-02-05 RX ADMIN — OMEGA-3-ACID ETHYL ESTERS 1 G: 1 CAPSULE, LIQUID FILLED ORAL at 09:46

## 2023-02-05 RX ADMIN — DESMOPRESSIN ACETATE 200 MCG: 0.1 TABLET ORAL at 09:46

## 2023-02-05 RX ADMIN — HYDRALAZINE HYDROCHLORIDE 10 MG: 10 TABLET, FILM COATED ORAL at 09:48

## 2023-02-05 RX ADMIN — FUROSEMIDE 40 MG: 40 TABLET ORAL at 09:48

## 2023-02-05 RX ADMIN — OXYCODONE AND ACETAMINOPHEN 1 TABLET: 5; 325 TABLET ORAL at 09:56

## 2023-02-05 RX ADMIN — DOCUSATE SODIUM 100 MG: 100 CAPSULE, LIQUID FILLED ORAL at 09:48

## 2023-02-05 RX ADMIN — FERROUS SULFATE TAB 325 MG (65 MG ELEMENTAL FE) 325 MG: 325 (65 FE) TAB at 09:46

## 2023-02-05 RX ADMIN — BUDESONIDE 500 MCG: 0.5 SUSPENSION RESPIRATORY (INHALATION) at 08:40

## 2023-02-05 RX ADMIN — METOPROLOL TARTRATE 150 MG: 50 TABLET ORAL at 09:46

## 2023-02-05 RX ADMIN — DESMOPRESSIN ACETATE 200 MCG: 0.1 TABLET ORAL at 20:18

## 2023-02-05 RX ADMIN — ARFORMOTEROL TARTRATE 15 MCG: 15 SOLUTION RESPIRATORY (INHALATION) at 08:40

## 2023-02-05 RX ADMIN — FAMOTIDINE 20 MG: 20 TABLET, FILM COATED ORAL at 09:48

## 2023-02-05 RX ADMIN — BUDESONIDE 500 MCG: 0.5 SUSPENSION RESPIRATORY (INHALATION) at 20:48

## 2023-02-05 ASSESSMENT — PAIN - FUNCTIONAL ASSESSMENT: PAIN_FUNCTIONAL_ASSESSMENT: ACTIVITIES ARE NOT PREVENTED

## 2023-02-05 ASSESSMENT — PAIN DESCRIPTION - LOCATION
LOCATION: ABDOMEN;RIB CAGE
LOCATION: FLANK

## 2023-02-05 ASSESSMENT — PAIN DESCRIPTION - DESCRIPTORS
DESCRIPTORS: ACHING;DISCOMFORT;SORE
DESCRIPTORS: ACHING;DISCOMFORT;SHARP

## 2023-02-05 ASSESSMENT — PAIN SCALES - GENERAL
PAINLEVEL_OUTOF10: 0
PAINLEVEL_OUTOF10: 0
PAINLEVEL_OUTOF10: 8
PAINLEVEL_OUTOF10: 8
PAINLEVEL_OUTOF10: 0

## 2023-02-05 ASSESSMENT — PAIN DESCRIPTION - ORIENTATION
ORIENTATION: RIGHT;LEFT
ORIENTATION: LEFT

## 2023-02-05 NOTE — PROGRESS NOTES
[address] [facility]  Family Medicine Attending    S: 78 y.o. female with COPD chronically on 4-5 L of O2, sarcoidosis, CHF, and pulmonary HTN, sarcoidosis as well as afib, chronic pain with opioid use who was admitted for COPD/CHF exacerbation. She was placed on BiPAP in the ED to help her low oxygenation. Sats have been improving  Today, she is resting on her left side, was sleeping on arrival, but awakens easily and answers questions appropriately. O: VS- Blood pressure (!) 145/108, pulse 86, temperature (!) 96.5 °F (35.8 °C), temperature source Temporal, resp. rate 22, height 5' (1.524 m), weight 158 lb 1.6 oz (71.7 kg), SpO2 97 %, not currently breastfeeding. Exam is as noted by resident with the following changes, additions or corrections:  Gen:  drowsy/sleepy; on bipap and tolerating well  CVS-irreg irreg  Lungs-bilateral wheeze and crackles at the bases. Impressions:  Acute respiratory failure with hypoxia (HCC)  Chronic afib  Chf exacerbation  HFpEF  Copd exacerbation  Sarcoidosis  Diabetes insipidus  hypothyroidism      Plan:     Lasix 40 mg daily  Daily weights  Repeat CXR in 1-2 days  Consider pulm consult if breating is not improving with nebs and pep flutter  Wean bipap as tolerated. Steroids-prednisone 40 mg daily  Ddavp  Continue home dose of levothyroxine  Continue lopressor and cardizem for afib. Attending Physician Statement  I have reviewed the chart and seen the patient with the resident(s). I personally reviewed images, EKG's and similar tests, if present. I personally reviewed and performed key elements of the history and exam.  I have reviewed and confirmed student and/or resident history and exam with changes as indicated above. I agree with the assessment, plan and orders as documented by the resident. Please refer to the resident progress note today and admission history and physical  for additional information.       Zi Uriarte MD

## 2023-02-05 NOTE — PLAN OF CARE
Problem: Discharge Planning  Goal: Discharge to home or other facility with appropriate resources  Outcome: Progressing     Problem: Safety - Adult  Goal: Free from fall injury  Outcome: Progressing     Problem: Pain  Goal: Verbalizes/displays adequate comfort level or baseline comfort level  Outcome: Progressing  Flowsheets (Taken 2/5/2023 0320 by Cleo Cortez RN)  Verbalizes/displays adequate comfort level or baseline comfort level: Encourage patient to monitor pain and request assistance

## 2023-02-05 NOTE — PROGRESS NOTES
resident msg via perfect serve re: abdominal wound and need for dressing orders. Stefanie Pinzon RN    Per patient, [facility] RN helps her change abdominal dressing. She uses opticell and optifoam. Dressing cleansed with NS and changed.  resident updated. Stefanie Pinzon, RN

## 2023-02-05 NOTE — PROGRESS NOTES
[facility]   [facility] Res[facility] Program  Resident In-Patient Progress Note    S:  Hospital day: 1   Brief Synopsis: Shae Clinton is a 78 y.o. female with PMH of COPD chronically on 4 to 5 L of O2, sarcoidosis, CHF, pulmonary hypertension, A. fib, chronic pain with chronic opioid use who presented to the ER for worsening shortness of breath and concern for COPD/CHF exacerbation starting a few days ago. Patient requiring between 8 to 10 L of oxygen in the ER, as well as BiPAP to help with symptoms. Pertinent labs in the ER included a proBNP 7730, troponin 17, 18 delta negative. Procalcitonin obtained overall to rule out pneumonia or any bacterial infection; 0.06. Chest x-ray revealing mild cardiomegaly with hazy bilateral airspace disease concerning for pneumonia versus CHF. CTA pulm with no acute emboli noted, bilateral chronic pleural effusions and parenchymal passes and areas of scarring. Patient was given 1 dose of Lasix IV 40 mg as well as DuoNeb's x3 ampoules and admitted for CHF/COPD exacerbation. Started on 40 mg prednisone for the next 5 days, if not improving can consider a BX though will hold off at this time. Ordered sputum culture and Gram stain, continued on Pulmicort Brovana and Pep flutter. Continue with Lasix 40 mg daily at this time, monitor input and output and daily weight.     Seen and examined this morning, no acute concerns overnight  Tolerating BiPAP well    Cont meds:   Scheduled meds:    enoxaparin  40 mg SubCUTAneous Daily    desmopressin  200 mcg Oral BID    dilTIAZem  30 mg Oral TID    docusate sodium  100 mg Oral BID    escitalopram  10 mg Oral Daily    famotidine  20 mg Oral Daily    ferrous sulfate  325 mg Oral BID    levothyroxine  75 mcg Oral Daily    metoprolol  150 mg Oral Daily    omega-3 acid ethyl esters  1 g Oral BID    [START ON 2/6/2023] potassium chloride  20 mEq Oral Once per day on Mon Wed Fri    furosemide  40 mg Oral Daily hydrALAZINE  10 mg Oral BID    budesonide  0.5 mg Nebulization BID    arformoterol tartrate  15 mcg Nebulization BID    predniSONE  40 mg Oral Lunch     PRN meds: sodium chloride, oxyCODONE-acetaminophen     I reviewed the patient's past medical and surgical history, Medications and Allergies. O:  BP (!) 138/96   Pulse 84   Temp 97.1 °F (36.2 °C) (Temporal)   Resp 23   Ht 5' (1.524 m)   Wt 150 lb (68 kg)   LMP  (LMP Unknown)   SpO2 99%   BMI 29.29 kg/m²   24 hour I&O: No intake/output data recorded. I/O this shift:  In: 360 [P.O.:360]  Out: 600 [Urine:600]      Physical Exam  Constitutional:       Appearance: She is not ill-appearing. Cardiovascular:      Pulses: Normal pulses. Heart sounds: Murmur heard. Comments: Irregularly irregular rhythm consistent with atrial fibrillation  Pulmonary:      Breath sounds: Wheezing present. Comments: On BiPAP, with lower lobe crackles and some expiratory wheezing noted  Chest:      Chest wall: Tenderness present. Abdominal:      General: Bowel sounds are normal. There is no distension. Palpations: Abdomen is soft. Tenderness: There is no abdominal tenderness. Comments: Chronic abdominal wound, dressed appropriately, no drainage noted   Musculoskeletal:         General: Normal range of motion. Right lower leg: No edema. Left lower leg: No edema. Skin:     General: Skin is warm. Coloration: Skin is not jaundiced. Findings: No bruising. Neurological:      Mental Status: She is alert and oriented to person, place, and time. Psychiatric:         Mood and Affect: Mood normal.         Behavior: Behavior normal.         Thought Content:  Thought content normal.         Judgment: Judgment normal.     Labs:  Na/K/Cl/CO2:  138/4.0/98/33 (02/04 1512)  BUN/Cr/glu/ALT/AST/amyl/lip:  18/1.0/--/58/41/--/-- (02/04 1512)  WBC/Hgb/Hct/Plts:  5.5/10.6/34.2/135 (02/04 1512)  estimated creatinine clearance is 48 mL/min (based on SCr of 1 mg/dL). Other pertinent labs as noted below    Radiology:  CTA PULMONARY W CONTRAST   Final Result   1. Signs of right-sided failure causing lack of proper opacification of the   pulmonary artery circulation including opacification of the more distal   subsegmental branches particular for the lower lobes with lack contrast   opacification of the pulmonary venous return. 2.  No sizable central pulmonary embolus seen but there are flow phenomena   causing solid filling defects in the central pulmonary arteries. See above   comments and recommendations. 3.  Chronic bilateral pleural effusions. 4.  Chronic bi basilar parenchymal passes and areas of scar in the lung   parenchyma. Cannot exclude component of chronic pneumonia or aspiration in   the left lower lobe. RECOMMENDATIONS:   Unavailable         XR CHEST PORTABLE   Final Result   1. Mild cardiomegaly with findings of hazy bilateral airspace disease which   could represent CHF or possibly pneumonia in the proper clinical setting. XR CHEST (2 VW)    (Results Pending)       A/P:  Principal Problem:    Acute respiratory failure with hypoxia (HCC)  Resolved Problems:    * No resolved hospital problems. *    Concern for CHF exacerbation  Hx HFpEF  Last echo with ejection fraction greater than 60%  proBNP elevated greater than 7000 this presentation, patient's baseline anywhere between 3340-4750  Patient has a history of noncompliance/nonadherence with medications  Supposed to be taking Lasix 40 mg daily at home  Patient does follow nephrology outpatient  Continue with Lasix 40 mg daily  Strict input output  Daily weights  Repeat chest x-ray in 1 to 2 days  CHF nurse consult  Monitor kidney function     Concern for COPD exacerbation  Patient has not smoked in the past 23 years  She has history of sarcoidosis, which may be worsening?   Prednisone 40 mg day 1/5  Pro-Paul 0.06, monitor off of antibiotics for now  Follow-up sputum culture and Gram stain  Consider pulm consult if not improving  Continue Pulmicort, Brovana and Pep flutter  Continue BiPAP, monitor O2 requirements, wean down to baseline as appropriate     Hx A.  Fib  Patient does not tolerate Eliquis well  Continue with Lovenox for now  Lopressor 150 mg daily for rate control, continue Cardizem 30 mg 3 times daily     History of sarcoidosis  Patient has not had any recent follow-up with pulm  Last visit 8/11/2022; at that time patient was recommended to get PFTs ; revealing both restrictive and obstructive disease  Patient completed sleep study as appropriate     Hx diabetes insipidus  Continue home dose desmopressin     Hx hypothyroidism  Continue home dose levothyroxine 75 mcg daily    Hx GERD  Continue home dose Pepcid     Hx depression  Continue home dose Lexapro 10 mg daily     History of chronic pain with chronic opioid use  Continue to wean down opioids  Percocet every 6 to 8 hours as needed    GI/DVT ppx: Lovenox and Protonix  Diet: Diabetic diet    Electronically signed by Walden Gottron, MD  PGY-3 on 2/5/2023 at 5:06 AM  This case was discussed with attending physician: Dr. Fuentes Chávez

## 2023-02-05 NOTE — PATIENT CARE CONFERENCE
P Quality Flow/Interdisciplinary Rounds Progress Note        Quality Flow Rounds held on February 5, 2023    Disciplines Attending:  Bedside Nurse, , , and Nursing Unit Leadership    Macy Youngblood was admitted on 2/4/2023  2:51 PM    Anticipated Discharge Date:       Disposition:    Blas Score:  Blas Scale Score: 19    Readmission Risk              Risk of Unplanned Readmission:  39           Discussed patient goal for the day, patient clinical progression, and barriers to discharge.   The following Goal(s) of the Day/Commitment(s) have been identified:  Diagnostics - Report Results and Labs - Report Results      Tyshawn Foy RN  February 5, 2023

## 2023-02-05 NOTE — PROGRESS NOTES
FM resident msg re: anticoagulation. Patient in controlled Afib with history of. Eliquis on home med list. MD note states Eliquis vs Lovenox, but currently neither ordered. PCD's placed. Will await return call or new orders. Bruno Mercado RN

## 2023-02-06 ENCOUNTER — APPOINTMENT (OUTPATIENT)
Dept: GENERAL RADIOLOGY | Age: 68
DRG: 291 | End: 2023-02-06
Payer: MEDICARE

## 2023-02-06 LAB
ANION GAP SERPL CALCULATED.3IONS-SCNC: 10 MMOL/L (ref 7–16)
ANISOCYTOSIS: ABNORMAL
BASOPHILS ABSOLUTE: 0 E9/L (ref 0–0.2)
BASOPHILS RELATIVE PERCENT: 0.2 % (ref 0–2)
BUN BLDV-MCNC: 20 MG/DL (ref 6–23)
CALCIUM SERPL-MCNC: 8.9 MG/DL (ref 8.6–10.2)
CHLORIDE BLD-SCNC: 97 MMOL/L (ref 98–107)
CO2: 33 MMOL/L (ref 22–29)
CREAT SERPL-MCNC: 1 MG/DL (ref 0.5–1)
EKG ATRIAL RATE: 159 BPM
EKG ATRIAL RATE: 182 BPM
EKG Q-T INTERVAL: 322 MS
EKG Q-T INTERVAL: 328 MS
EKG QRS DURATION: 72 MS
EKG QRS DURATION: 74 MS
EKG QTC CALCULATION (BAZETT): 402 MS
EKG QTC CALCULATION (BAZETT): 425 MS
EKG R AXIS: 83 DEGREES
EKG R AXIS: 97 DEGREES
EKG T AXIS: -178 DEGREES
EKG T AXIS: -78 DEGREES
EKG VENTRICULAR RATE: 101 BPM
EKG VENTRICULAR RATE: 94 BPM
EOSINOPHILS ABSOLUTE: 0 E9/L (ref 0.05–0.5)
EOSINOPHILS RELATIVE PERCENT: 0 % (ref 0–6)
GFR SERPL CREATININE-BSD FRML MDRD: >60 ML/MIN/1.73
GLUCOSE BLD-MCNC: 102 MG/DL (ref 74–99)
HCT VFR BLD CALC: 33.8 % (ref 34–48)
HEMOGLOBIN: 10.6 G/DL (ref 11.5–15.5)
HYPOCHROMIA: ABNORMAL
LYMPHOCYTES ABSOLUTE: 0.22 E9/L (ref 1.5–4)
LYMPHOCYTES RELATIVE PERCENT: 3.5 % (ref 20–42)
MCH RBC QN AUTO: 26 PG (ref 26–35)
MCHC RBC AUTO-ENTMCNC: 31.4 % (ref 32–34.5)
MCV RBC AUTO: 83 FL (ref 80–99.9)
MONOCYTES ABSOLUTE: 0.27 E9/L (ref 0.1–0.95)
MONOCYTES RELATIVE PERCENT: 5.2 % (ref 2–12)
NEUTROPHILS ABSOLUTE: 4.91 E9/L (ref 1.8–7.3)
NEUTROPHILS RELATIVE PERCENT: 91.3 % (ref 43–80)
OVALOCYTES: ABNORMAL
PDW BLD-RTO: 16.5 FL (ref 11.5–15)
PLATELET # BLD: 126 E9/L (ref 130–450)
PMV BLD AUTO: 11.6 FL (ref 7–12)
POIKILOCYTES: ABNORMAL
POTASSIUM SERPL-SCNC: 3.9 MMOL/L (ref 3.5–5)
RBC # BLD: 4.07 E12/L (ref 3.5–5.5)
SODIUM BLD-SCNC: 140 MMOL/L (ref 132–146)
TARGET CELLS: ABNORMAL
WBC # BLD: 5.4 E9/L (ref 4.5–11.5)

## 2023-02-06 PROCEDURE — 93010 ELECTROCARDIOGRAM REPORT: CPT | Performed by: INTERNAL MEDICINE

## 2023-02-06 PROCEDURE — 99232 SBSQ HOSP IP/OBS MODERATE 35: CPT | Performed by: FAMILY MEDICINE

## 2023-02-06 PROCEDURE — 6370000000 HC RX 637 (ALT 250 FOR IP): Performed by: FAMILY MEDICINE

## 2023-02-06 PROCEDURE — 6360000002 HC RX W HCPCS

## 2023-02-06 PROCEDURE — 36415 COLL VENOUS BLD VENIPUNCTURE: CPT

## 2023-02-06 PROCEDURE — 94640 AIRWAY INHALATION TREATMENT: CPT

## 2023-02-06 PROCEDURE — 85025 COMPLETE CBC W/AUTO DIFF WBC: CPT

## 2023-02-06 PROCEDURE — 80048 BASIC METABOLIC PNL TOTAL CA: CPT

## 2023-02-06 PROCEDURE — 2700000000 HC OXYGEN THERAPY PER DAY

## 2023-02-06 PROCEDURE — 94660 CPAP INITIATION&MGMT: CPT

## 2023-02-06 PROCEDURE — 6360000002 HC RX W HCPCS: Performed by: FAMILY MEDICINE

## 2023-02-06 PROCEDURE — 71046 X-RAY EXAM CHEST 2 VIEWS: CPT

## 2023-02-06 PROCEDURE — 2140000000 HC CCU INTERMEDIATE R&B

## 2023-02-06 RX ORDER — LABETALOL HYDROCHLORIDE 5 MG/ML
10 INJECTION, SOLUTION INTRAVENOUS EVERY 6 HOURS PRN
Status: DISCONTINUED | OUTPATIENT
Start: 2023-02-06 | End: 2023-02-08 | Stop reason: HOSPADM

## 2023-02-06 RX ORDER — HYDRALAZINE HYDROCHLORIDE 20 MG/ML
10 INJECTION INTRAMUSCULAR; INTRAVENOUS EVERY 6 HOURS PRN
Status: DISCONTINUED | OUTPATIENT
Start: 2023-02-06 | End: 2023-02-08 | Stop reason: HOSPADM

## 2023-02-06 RX ADMIN — HYDRALAZINE HYDROCHLORIDE 10 MG: 10 TABLET, FILM COATED ORAL at 20:47

## 2023-02-06 RX ADMIN — DILTIAZEM HYDROCHLORIDE 30 MG: 30 TABLET, FILM COATED ORAL at 08:20

## 2023-02-06 RX ADMIN — METOPROLOL TARTRATE 150 MG: 50 TABLET ORAL at 08:20

## 2023-02-06 RX ADMIN — DOCUSATE SODIUM 100 MG: 100 CAPSULE, LIQUID FILLED ORAL at 20:47

## 2023-02-06 RX ADMIN — DILTIAZEM HYDROCHLORIDE 30 MG: 30 TABLET, FILM COATED ORAL at 20:46

## 2023-02-06 RX ADMIN — BUDESONIDE 500 MCG: 0.5 SUSPENSION RESPIRATORY (INHALATION) at 19:34

## 2023-02-06 RX ADMIN — OMEGA-3-ACID ETHYL ESTERS 1 G: 1 CAPSULE, LIQUID FILLED ORAL at 08:22

## 2023-02-06 RX ADMIN — ESCITALOPRAM 10 MG: 10 TABLET, FILM COATED ORAL at 08:22

## 2023-02-06 RX ADMIN — FERROUS SULFATE TAB 325 MG (65 MG ELEMENTAL FE) 325 MG: 325 (65 FE) TAB at 08:20

## 2023-02-06 RX ADMIN — DILTIAZEM HYDROCHLORIDE 30 MG: 30 TABLET, FILM COATED ORAL at 13:39

## 2023-02-06 RX ADMIN — PETROLATUM: 420 OINTMENT TOPICAL at 20:47

## 2023-02-06 RX ADMIN — ARFORMOTEROL TARTRATE 15 MCG: 15 SOLUTION RESPIRATORY (INHALATION) at 19:34

## 2023-02-06 RX ADMIN — FUROSEMIDE 40 MG: 40 TABLET ORAL at 08:21

## 2023-02-06 RX ADMIN — SALINE NASAL SPRAY 1 SPRAY: 1.5 SOLUTION NASAL at 13:58

## 2023-02-06 RX ADMIN — PREDNISONE 40 MG: 20 TABLET ORAL at 11:27

## 2023-02-06 RX ADMIN — ENOXAPARIN SODIUM 40 MG: 100 INJECTION SUBCUTANEOUS at 08:22

## 2023-02-06 RX ADMIN — FERROUS SULFATE TAB 325 MG (65 MG ELEMENTAL FE) 325 MG: 325 (65 FE) TAB at 20:46

## 2023-02-06 RX ADMIN — LEVOTHYROXINE SODIUM 75 MCG: 0.03 TABLET ORAL at 08:20

## 2023-02-06 RX ADMIN — OXYCODONE AND ACETAMINOPHEN 1 TABLET: 5; 325 TABLET ORAL at 18:25

## 2023-02-06 RX ADMIN — DESMOPRESSIN ACETATE 200 MCG: 0.1 TABLET ORAL at 20:47

## 2023-02-06 RX ADMIN — OXYCODONE AND ACETAMINOPHEN 1 TABLET: 5; 325 TABLET ORAL at 11:10

## 2023-02-06 RX ADMIN — OMEGA-3-ACID ETHYL ESTERS 1 G: 1 CAPSULE, LIQUID FILLED ORAL at 20:46

## 2023-02-06 RX ADMIN — OXYCODONE AND ACETAMINOPHEN 1 TABLET: 5; 325 TABLET ORAL at 04:16

## 2023-02-06 RX ADMIN — DOCUSATE SODIUM 100 MG: 100 CAPSULE, LIQUID FILLED ORAL at 08:21

## 2023-02-06 RX ADMIN — PETROLATUM: 420 OINTMENT TOPICAL at 13:58

## 2023-02-06 RX ADMIN — HYDRALAZINE HYDROCHLORIDE 10 MG: 20 INJECTION INTRAMUSCULAR; INTRAVENOUS at 11:27

## 2023-02-06 RX ADMIN — HYDRALAZINE HYDROCHLORIDE 10 MG: 10 TABLET, FILM COATED ORAL at 08:21

## 2023-02-06 RX ADMIN — DESMOPRESSIN ACETATE 200 MCG: 0.1 TABLET ORAL at 08:22

## 2023-02-06 RX ADMIN — POTASSIUM CHLORIDE 20 MEQ: 1500 TABLET, EXTENDED RELEASE ORAL at 08:20

## 2023-02-06 RX ADMIN — FAMOTIDINE 20 MG: 20 TABLET, FILM COATED ORAL at 08:21

## 2023-02-06 ASSESSMENT — PAIN DESCRIPTION - DESCRIPTORS
DESCRIPTORS: ACHING;DISCOMFORT;SORE
DESCRIPTORS: ACHING;DISCOMFORT;SORE
DESCRIPTORS: SORE;ACHING;DISCOMFORT

## 2023-02-06 ASSESSMENT — PAIN SCALES - GENERAL
PAINLEVEL_OUTOF10: 0
PAINLEVEL_OUTOF10: 9
PAINLEVEL_OUTOF10: 0
PAINLEVEL_OUTOF10: 8
PAINLEVEL_OUTOF10: 6
PAINLEVEL_OUTOF10: 10
PAINLEVEL_OUTOF10: 0

## 2023-02-06 ASSESSMENT — PAIN - FUNCTIONAL ASSESSMENT
PAIN_FUNCTIONAL_ASSESSMENT: ACTIVITIES ARE NOT PREVENTED

## 2023-02-06 ASSESSMENT — PAIN DESCRIPTION - ORIENTATION
ORIENTATION: RIGHT
ORIENTATION: RIGHT
ORIENTATION: LEFT;UPPER

## 2023-02-06 ASSESSMENT — EJECTION FRACTION
EF_VALUE: 60%
EF_SOURCE: 2D ECHO

## 2023-02-06 ASSESSMENT — PAIN DESCRIPTION - FREQUENCY: FREQUENCY: INTERMITTENT

## 2023-02-06 ASSESSMENT — PAIN DESCRIPTION - LOCATION
LOCATION: RIB CAGE
LOCATION: ABDOMEN
LOCATION: RIB CAGE

## 2023-02-06 ASSESSMENT — PAIN DESCRIPTION - ONSET: ONSET: ON-GOING

## 2023-02-06 ASSESSMENT — PAIN DESCRIPTION - PAIN TYPE: TYPE: CHRONIC PAIN

## 2023-02-06 NOTE — FLOWSHEET NOTE
Inpatient Wound Care(initial evaluation)  6512    Admit Date: 2/4/2023  2:51 PM    Reason for consult:  abdominal wound    Significant history:  per H & P  The patient is a 78 y.o. female with PMH of COPD chronically on 4 to 5 L of O2, sarcoidosis, CHF, pulmonary hypertension, A. fib, chronic pain with chronic opioid use who presented to the ER for worsening shortness of breath and concern for COPD/CHF exacerbation. Patient states that her symptoms started a few days ago. Due to her underlying sarcoidosis, patient already states that she is having difficulty breathing which is much worse than before. She states that currently she is getting short of breath after a short walk from her bed to her bathroom; in the same room. She states most recently over the course of the last few days, she has noticed that her shortness of breath is worsening to the point where she was requiring anywhere between 8 to 10 L of O2, and requires CPAP more often. She notes that it is taking her longer to recover after short periods of ambulation. Notes that she was monitoring herself at home, though decided to come to the ER after experiencing chest pressure for the last 2 days. She denies any radiation of chest pain/pressure down her left or right arms, denies any worsening abdominal pain, any headaches, dizziness, nausea/vomiting or worsening peripheral neuropathy. Patient lives with her , presently denying any sick contacts at home.     Wound history:  admitted with wound from home    Findings:     02/06/23 1255   Skin Integumentary    Skin Integrity   (dry flaky skin)   Location generalized   Skin Integrity Site 2   Skin Integrity Location 2   (old healed)   Location 2 abdomen   Skin Integrity Site 3   Skin Integrity Location 3 Vascular discoloration    Location 3 BLE   Wound 07/10/22 Abdomen Mid   Date First Assessed/Time First Assessed: 07/10/22 2000   Present on Hospital Admission: Yes  Location: Abdomen  Wound Location Orientation: Mid   Wound Image    Wound Etiology Non-Healing Surgical   Dressing/Treatment Foam  (lifted to assess)   Wound Length (cm) 4 cm   Wound Width (cm) 2 cm   Wound Depth (cm) 0.1 cm   Wound Surface Area (cm^2) 8 cm^2   Change in Wound Size % (l*w) 80   Wound Volume (cm^3) 0.8 cm^3   Wound Healing % 93   Wound Assessment Pink/red   Drainage Amount None   Kaitlyn-wound Assessment Dry/flaky   Wound Thickness Description not for Pressure Injury Partial thickness   Purewick in place  Unable to pull self up in bed  charcot's    **Informed Consent**    The patient has given verbal consent to have photos taken of wound and inserted into their chart as part of their permanent medical record for purposes of documentation, treatment management and/or medical review. All Images taken on 2/6/23 of patient name: Ken Cannon were transmitted and stored on secured [employer] located within [facility] by a registered [employer] Mobile Application Device.       Plan:  Foam dressing to abdomen, change every other day  Dietary consult  Will need continued preventative care to include but not limited to dietary consult, heel protectors, floating of heels, turning q 2 hours      Irving Garcia RN 2/6/2023 1:07 PM

## 2023-02-06 NOTE — ACP (ADVANCE CARE PLANNING)
Advance Care Planning   Healthcare Decision Maker:    Primary Decision Maker: [address] [place] [address] - [phone]    Secondary Decision Maker: Alee Connolly - Raquel - [phone]    Click here to complete Healthcare Decision Makers including selection of the Healthcare Decision Maker Relationship (ie \"Primary\").

## 2023-02-06 NOTE — PATIENT CARE CONFERENCE
P Quality Flow/Interdisciplinary Rounds Progress Note        Quality Flow Rounds held on February 6, 2023    Disciplines Attending:  Bedside Nurse, , , and Nursing Unit Leadership    Ken Cannon was admitted on 2/4/2023  2:51 PM    Anticipated Discharge Date:       Disposition:    Blas Score:  Blas Scale Score: 19    Readmission Risk              Risk of Unplanned Readmission:  40           Discussed patient goal for the day, patient clinical progression, and barriers to discharge.   The following Goal(s) of the Day/Commitment(s) have been identified:  report labs/diagnostics       Kaitlyn Rangel RN  February 6, 2023

## 2023-02-06 NOTE — DISCHARGE INSTRUCTIONS
=====================================  [facility], [address]  =====================================    Take your medications as directed in this summary    Future scheduled appointments are listed below or recommended appointments are mentioned above. Future Appointments   Date Time Provider Mauri Sanford   2/13/2023  3:00 PM MD Noris Garcia [facility]   2/21/2023 12:00 PM [facility] ROOM 1 [facility]       Call to confirm or schedule your appointment as soon as possible and/or if there are any appointment changes or other issues. It is important that you follow up for better monitoring of the reason of your hospitalization. Follow up with the specialists that saw you during your stay as well. If you have any questions call your PCP at [phone]. Once discharged from [facility], you can :     Return to work : Yes, you may return to work  Activity : As tolerated  Stairs : As tolerated  Exercise : As tolerated  Lifting : As tolerated   Sexual activity : Yes  Driving : With seat belt on. NO driving on narcotic pain medication if prescribed   Medications : Always take your medications as prescribed  Wound Care: none needed  Diet : You are asked to make an attempt to improve diet and exercise patterns to aid in medical management of your medical condition/problem. Call [facility] with any further questions. Return to Emergency Department with any worsening of your condition and/or fever greater than 101 degrees, new weakness, shortness of breath or chest pain. HEART FAILURE  / CONGESTIVE HEART FAILURE  DISCHARGE INSTRUCTIONS:  GUIDELINES TO FOLLOW AT HOME    Self- Managed Care:     MEDICATIONS:  Take your medication as directed. If you are experiencing any side effects, inform your doctor, Do not stop taking any of your medications without letting your doctor know.    Check with your doctor before taking any over-the-counter medications / herbal / or dietary supplements. They may interfere with your other medications. Do not take ibuprofen (Advil or Motrin) and naproxen (Aleve) without talking to your doctor first. They could make your heart failure worse. WEIGHT MONITORING:   Weigh yourself everyday (with the same scale) around the same time of the day and write it down. (you can chart them on a calendar or keep track of them on paper. Notify your doctor of a weight gain of 3 pounds or more in 1 day   OR a total of 5 pounds or more in 1 week    Take your weight record to your doctor visits  Also, the same goes if you loose more than 3# in one day, let your heart doctor know. DIET:   Cardiac heart healthy diet- Low saturated / low trans fat, no added salt, caffeine restricted, Low sodium diet-   No more than 2,000mg (2 grams) of salt / sodium per day (which equals to a little less than  a teaspoon of salt)  If your doctor wants you on a fluid restriction. ..it is usually recommended a fluid limit of 2,000cc -  Fluid restriction- 2,000 ml (milliliters) = 64 ounces = you can have 8 glasses of fluid per day (each glass 8 ounces)    Follow a low salt diet - avoid using salt at the table, avoid / limit use of canned soups, processed / packaged foods, salted snacks, olives and pickles. Do not use a salt substitute without checking with your doctor, they may contain a high amount of potassioum. (Mrs. Macarena Fulton is safe to use).     Limit the use of alcohol       CALL YOUR DOCTOR THE FIRST DAY YOU NOTICE ANY OF THESE   SYMPTOMS:  You have a weight gain of 3 pounds or more in 1 day         OR 5 pounds or more in one week  More shortness of breath  More swelling of your stomach, legs, ankles or feet  Feeling more tired, No energy  Dry hacky cough  Dizziness  More chest pain / discomfort       (CALL 911 IF ANY OF THE FOLLOWING OCCURS  Chest pain (not relieved with nitroglycerine, if you have been prescribed this medication)  Severe shortness of breath  Faint / Pass out  Confusion / cannot think clearly  If symptoms get worse           SMOKING - TOBACCO USE:  * IF YOU SMOKE - STOP! Kick the habit. [address] Program is offered at [facility] and [address] [facility]. Call [phone] extension [phone] for more information. ACTIVITY:   (Ask your doctor when you will be able to return to work and before starting any exercise program.  Do not drive unless unless your doctor has given you permission to do so). Start light exercise. Even if you can only do a small amount, exercise will help you get stronger, have more energy, help manage your weight and decrease  stress. Walking is an easy way to get exercise. Start out slowly and  increase the amount you walk as tolerated  If you become short of breath, dizzy or have chest pain; stop, sit down, and rest.  If you feel \"wiped out\" the day after you exercise, walk at a slower pace or for a shorter distance. You can gradually increase the pace or amount of time. (Do not exercise right after a meal or in extreme temperatures, such as above 85 degrees, if the air is really humid, or wind chill is less than 20 degrees)                                             ADDITIONAL INFORMATION:  Avoid getting sick from colds and the flu. Stay away from friends or family that you know may have a contagious illness  Get plenty of rest   Get a flu shot each year. Get a pneumococcal vaccine shot. If you have had one before, ask your doctor whether you need another dose. My Goal for Self-management of Heart Failure Includes 5 steps :    1. Notice a change in symptoms ( weight gain, short of breath, leg swelling, decreased activity level, bloating. ...)    2. Evaluate the change: (use the Heart Failure Zones )     3.  Decide to take action: decide what your options are, such as: (call your doctor for an extra visit, take a prescribed medication, such as your water pill if your doctor has given you directions to do so, [address])    4. Come up with a strategy:  (now you call the doctor for advice / appointment. This is where you take action!!! Do not wait, catch the symptom early and treat it before it worsens. 5. Evaluate the response: The next day, check your Heart Failure Zones: are you in the GREEN ZONE (safe zone)? Worsening symptoms of YELLOW ZONE? Or have you moved to the RED ZONE and need to call 911 or go to the Emergency Room for evaluation? Call your doctor's office to update them on your symptoms of heart failure. Your information:  Name: Damien Rose  : 1955    Your instructions:    Continue dressing changes to abdomen as prior to admission    What to do after you leave the hospital:    Recommended diet: {diet:22723}    Recommended activity: {discharge activity:55956}        The following personal items were collected during your admission and were returned to you:    Belongings  Dental Appliances: Uppers, Lowers (at home)  Vision - Corrective Lenses: None  Hearing Aid: None  Clothing: Footwear, Hat, Pants, Shirt, Other (Comment) (scarf)  Jewelry: Ring (ring x3)  Electronic Devices: Cell Phone,   Weapons (Notify Protective Services/Security): None  Home Medications: None  Valuables Given To: Patient  Provide Name(s) of Who Valuable(s) Were Given To: Patient    Information obtained by:  By signing below, I understand that if any problems occur once I leave the hospital I am to contact ***. I understand and acknowledge receipt of the instructions indicated above.    Your information:  Name: Damien Rose  : 1955    Your instructions:    ***    What to do after you leave the hospital:    Recommended diet: {diet:41491}    Recommended activity: {discharge activity:43183}        The following personal items were collected during your admission and were returned to you:    Belongings  Dental Appliances: Uppers, Lowers (at home)  Vision - Corrective Lenses: None  Hearing Aid: None  Clothing: Footwear, Hat, Pants, Shirt, Other (Comment) (scarf)  Jewelry: Ring (ring x3)  Electronic Devices: Cell Phone,   Weapons (Notify Protective Services/Security): None  Home Medications: None  Valuables Given To: Patient  Provide Name(s) of Who Valuable(s) Were Given To: Patient    Information obtained by:  By signing below, I understand that if any problems occur once I leave the hospital I am to contact ***. I understand and acknowledge receipt of the instructions indicated above.

## 2023-02-06 NOTE — PLAN OF CARE
Problem: Chronic Conditions and Co-morbidities  Goal: Patient's chronic conditions and co-morbidity symptoms are monitored and maintained or improved  Outcome: Progressing     Problem: Safety - Adult  Goal: Free from fall injury  2/5/2023 2243 by Geoffrey French RN  Outcome: Progressing     Problem: Pain  Goal: Verbalizes/displays adequate comfort level or baseline comfort level  2/5/2023 2243 by Geoffrey French RN  Outcome: Progressing

## 2023-02-06 NOTE — PLAN OF CARE
Problem: Chronic Conditions and Co-morbidities  Goal: Patient's chronic conditions and co-morbidity symptoms are monitored and maintained or improved  Outcome: Progressing     Problem: Safety - Adult  Goal: Free from fall injury  Outcome: Progressing     Problem: Pain  Goal: Verbalizes/displays adequate comfort level or baseline comfort level  Outcome: Progressing  Flowsheets (Taken 2/6/2023 2080 by Bruno Mercado RN)  Verbalizes/displays adequate comfort level or baseline comfort level: Encourage patient to monitor pain and request assistance     Problem: ABCDS Injury Assessment  Goal: Absence of physical injury  Outcome: Progressing     Problem: Cardiovascular - Adult  Goal: Maintains optimal cardiac output and hemodynamic stability  Outcome: Progressing     Problem: Metabolic/Fluid and Electrolytes - Adult  Goal: Hemodynamic stability and optimal renal function maintained  Outcome: Progressing

## 2023-02-06 NOTE — PROGRESS NOTES
[facility]   Family Medicine Residency Program  Resident In-Patient Progress Note    S:  Hospital day: 2   Brief Synopsis: Osiris Monahan is a 78 y.o. female with PMH of COPD chronically on 4 to 5 L of O2, sarcoidosis, CHF, pulmonary hypertension, A. fib, chronic pain with chronic opioid use who presented to the ER for worsening shortness of breath and concern for COPD/CHF exacerbation starting a few days ago. Patient requiring between 8 to 10 L of oxygen in the ER, as well as BiPAP to help with symptoms. Pertinent labs in the ER included a proBNP 7730, troponin 17, 18 delta negative. Procalcitonin obtained overall to rule out pneumonia or any bacterial infection; 0.06. Chest x-ray revealing mild cardiomegaly with hazy bilateral airspace disease concerning for pneumonia versus CHF. CTA pulm with no acute emboli noted, bilateral chronic pleural effusions and parenchymal passes and areas of scarring. Patient was given 1 dose of Lasix IV 40 mg as well as DuoNeb's x3 ampoules and admitted for CHF/COPD exacerbation. Started on 40 mg prednisone for the next 5 days, if not improving can consider a BX though will hold off at this time. Ordered sputum culture and Gram stain, continued on Pulmicort Brovana and Pep flutter. Continue with Lasix 40 mg daily at this time, monitor input and output and daily weight. Seen and examined this morning, no acute concerns overnight  Tolerating BiPAP well. She is using BiPAP and intermittently doing nasal cannula approximately 7 to 8 L. She states that she is on home of 6 L nasal cannula chronically.     Cont meds:   Scheduled meds:    enoxaparin  40 mg SubCUTAneous Daily    desmopressin  200 mcg Oral BID    dilTIAZem  30 mg Oral TID    docusate sodium  100 mg Oral BID    escitalopram  10 mg Oral Daily    famotidine  20 mg Oral Daily    ferrous sulfate  325 mg Oral BID    levothyroxine  75 mcg Oral Daily    metoprolol  150 mg Oral Daily    omega-3 acid ethyl esters  1 g Oral BID    potassium chloride  20 mEq Oral Once per day on Mon Wed Fri    furosemide  40 mg Oral Daily    hydrALAZINE  10 mg Oral BID    budesonide  0.5 mg Nebulization BID    arformoterol tartrate  15 mcg Nebulization BID    predniSONE  40 mg Oral Lunch     PRN meds: hydrALAZINE, labetalol, guaiFENesin-dextromethorphan, sodium chloride, oxyCODONE-acetaminophen     I reviewed the patient's past medical and surgical history, Medications and Allergies. O:  BP (!) 179/86 Comment: rn aware  Pulse 70   Temp 97.5 °F (36.4 °C) (Temporal)   Resp 18   Ht 5' (1.524 m)   Wt 160 lb 1.6 oz (72.6 kg)   LMP  (LMP Unknown)   SpO2 100%   BMI 31.27 kg/m²   24 hour I&O: I/O last 3 completed shifts: In: 65 [P.O.:660]  Out: 1400 [Urine:1400]  I/O this shift:  In: 120 [P.O.:120]  Out: -       Physical Exam  Constitutional:       Appearance: She is not ill-appearing. Cardiovascular:      Pulses: Normal pulses. Heart sounds: Murmur heard. Comments: Irregularly irregular rhythm consistent with atrial fibrillation  Pulmonary:      Breath sounds: Wheezing present. Comments: On BiPAP, with lower lobe crackles and some expiratory wheezing noted  Chest:      Chest wall: Tenderness present. Abdominal:      General: Bowel sounds are normal. There is no distension. Palpations: Abdomen is soft. Tenderness: There is no abdominal tenderness. Comments: Chronic abdominal wound, dressed appropriately, no drainage noted   Musculoskeletal:         General: Normal range of motion. Right lower leg: No edema. Left lower leg: No edema. Skin:     General: Skin is warm. Coloration: Skin is not jaundiced. Findings: No bruising. Neurological:      Mental Status: She is alert and oriented to person, place, and time. Psychiatric:         Mood and Affect: Mood normal.         Behavior: Behavior normal.         Thought Content:  Thought content normal.         Judgment: Judgment normal.     Labs:  Na/K/Cl/CO2:  140/3.9/97/33 (02/06 0446)  BUN/Cr/glu/ALT/AST/amyl/lip:  20/1.0/--/--/--/--/-- (02/06 0446)  WBC/Hgb/Hct/Plts:  5.4/10.6/33.8/126 (02/06 0446)  estimated creatinine clearance is 49 mL/min (based on SCr of 1 mg/dL). Other pertinent labs as noted below    Radiology:  XR CHEST (2 VW)   Final Result   No significant change. Findings raise the possibility of possible pulmonary   hypertension         CTA PULMONARY W CONTRAST   Final Result   1. Signs of right-sided failure causing lack of proper opacification of the   pulmonary artery circulation including opacification of the more distal   subsegmental branches particular for the lower lobes with lack contrast   opacification of the pulmonary venous return. 2.  No sizable central pulmonary embolus seen but there are flow phenomena   causing solid filling defects in the central pulmonary arteries. See above   comments and recommendations. 3.  Chronic bilateral pleural effusions. 4.  Chronic bi basilar parenchymal passes and areas of scar in the lung   parenchyma. Cannot exclude component of chronic pneumonia or aspiration in   the left lower lobe. RECOMMENDATIONS:   Unavailable         XR CHEST PORTABLE   Final Result   1. Mild cardiomegaly with findings of hazy bilateral airspace disease which   could represent CHF or possibly pneumonia in the proper clinical setting. A/P:  Principal Problem:    Acute respiratory failure with hypoxia (HCC)  Resolved Problems:    * No resolved hospital problems.  *    Concern for CHF exacerbation  Hx HFpEF  Last echo with ejection fraction greater than 60%  proBNP elevated greater than 7000 this presentation, patient's baseline anywhere between 1404-7002  Patient has a history of noncompliance/nonadherence with medications  Supposed to be taking Lasix 40 mg daily at home  Patient does follow nephrology outpatient  Continue with Lasix 40 mg daily  Strict input output  Daily weights  CHF nurse consult  Monitor kidney function     Concern for COPD exacerbation  Patient has not smoked in the past 23 years  She has history of sarcoidosis, which may be worsening? Prednisone 40 mg x5 days  Pro-Paul 0.06, monitor off of antibiotics for now  Follow-up sputum culture and Gram stain  Consider pulm consult if not improving  Continue Pulmicort, Brovana and Pep flutter  Continue BiPAP, monitor O2 requirements, wean down to baseline as appropriate     Hx A.  Fib  Patient does not tolerate Eliquis well  Continue with Lovenox for now  Lopressor 150 mg daily for rate control, continue Cardizem 30 mg 3 times daily     History of sarcoidosis  Patient has not had any recent follow-up with pulm  Last visit 8/11/2022; at that time patient was recommended to get PFTs ; revealing both restrictive and obstructive disease  Patient completed sleep study as appropriate     Hx diabetes insipidus  Continue home dose desmopressin     Hx hypothyroidism  Continue home dose levothyroxine 75 mcg daily    Hx GERD  Continue home dose Pepcid     Hx depression  Continue home dose Lexapro 10 mg daily     History of chronic pain with chronic opioid use  Continue to wean down opioids  Percocet every 6 to 8 hours as needed    GI/DVT ppx: Lovenox and Protonix  Diet: Diabetic diet    Electronically signed by Jb Tripathi MD  PGY-3 on 2/6/2023 at 12:22 PM  This case was discussed with attending physician: Dr. Radames Peng

## 2023-02-06 NOTE — PLAN OF CARE
Problem: Chronic Conditions and Co-morbidities  Goal: Patient's chronic conditions and co-morbidity symptoms are monitored and maintained or improved  2/5/2023 2314 by Kwame Brito RN  Outcome: Progressing  2/5/2023 2243 by Rosa Maria Kat RN  Outcome: Progressing     Problem: Discharge Planning  Goal: Discharge to home or other facility with appropriate resources  2/5/2023 2314 by Kwame Brito RN  Outcome: Progressing  2/5/2023 1719 by Manuel Mortensen RN  Outcome: Progressing     Problem: Safety - Adult  Goal: Free from fall injury  2/5/2023 2314 by Kwame Brito RN  Outcome: Progressing  2/5/2023 2243 by Rosa Maria Kat RN  Outcome: Progressing  2/5/2023 1719 by Manuel Mortensen RN  Outcome: Progressing     Problem: Pain  Goal: Verbalizes/displays adequate comfort level or baseline comfort level  2/5/2023 2314 by Kwame Brito RN  Outcome: Progressing  2/5/2023 2243 by Rosa Maria Kat RN  Outcome: Progressing  2/5/2023 1719 by Manuel Mortensen RN  Outcome: Progressing  Flowsheets (Taken 2/5/2023 0320 by Kwame Brito RN)  Verbalizes/displays adequate comfort level or baseline comfort level: Encourage patient to monitor pain and request assistance

## 2023-02-06 NOTE — PLAN OF CARE
Patient's chart updated to reflect:      . - HF care plan, HF education points and HF discharge instructions.  -Orders: 2 gram sodium diet, daily weights, I/O.  -PCP and cardiology follow up appointments to be scheduled within 7 days of hospital discharge. -CHF education session will be provided to the patient prior to hospital discharge.     Vance Bolden, RN RN, BSN  Heart Failure Navigator

## 2023-02-06 NOTE — PROGRESS NOTES
[facility]  Family Medicine Attending    S: 78 y.o. female with COPD chronically on 4-5 L of O2, sarcoidosis, CHF, and pulmonary HTN, sarcoidosis as well as afib, chronic pain with opioid use who was admitted for COPD/CHF exacerbation. She was placed on BiPAP in the ED to help her low oxygenation. Sats have been improving  Today, she is sitting up. She is witty and joking with our FM team.  She notes that her breathing is starting to improve. She requests her phenergan with codeine cough syrup. Tolerating bipap when resting. O: VS- Blood pressure 132/78, pulse 65, temperature 97.6 °F (36.4 °C), temperature source Temporal, resp. rate 20, height 5' (1.524 m), weight 160 lb 1.6 oz (72.6 kg), SpO2 99 %, not currently breastfeeding. Exam is as noted by resident with the following changes, additions or corrections:  Gen:  awake and oriented x 3, pleasant  CVS-irreg irreg  Lungs-bilateral wheeze and crackles at the bases improving  Ext-there is bony abnormality on the left medial malleolus; no edema bilaterally      Impressions:  Acute respiratory failure with hypoxia (HCC)  Chronic afib  Chf exacerbation  HFpEF  Copd exacerbation  Sarcoidosis  Diabetes insipidus  hypothyroidism      Plan:     Lasix 40 mg daily  Daily weights (I&O)  -620 mL since admission  Repeat CXR no significant change  Consider pulm consult if breating is not improving with nebs and pep flutter  Wean bipap as tolerated. Steroids-prednisone 40 mg daily x 5 days  Ddavp  Continue home dose of levothyroxine  Continue lopressor and cardizem for afib. Attending Physician Statement  I have reviewed the chart and seen the patient with the resident(s). I personally reviewed images, EKG's and similar tests, if present. I personally reviewed and performed key elements of the history and exam.  I have reviewed and confirmed student and/or resident history and exam with changes as indicated above.   I agree with the assessment, plan and orders as documented by the resident. Please refer to the resident progress note today  for additional information.       Ramonita Gonzalez MD

## 2023-02-06 NOTE — CONSULTS
CHF NURSE NAVIGATOR CONSULT NOTE:      Patient currently admitted with diagnosis of Diastolic heart failure. Patient was alert and oriented during the consultation. She was engaged and asked appropriate questions throughout the education session. She is agreeable to self monitoring, following a low sodium diet, outpatient follow up, and medication compliance. Scheduling with the CHF clinic and PCP Yes. Future Appointments   Date Time Provider Mauri Sanford   2/13/2023  3:00 PM [facility]   2/21/2023 12:00 PM [facility] CHF ROOM 1 [facility]       Barriers to Care:  Contributing risk factors for Heart Failure are identified as multiple medical ailments     The patient is ordered:  Diet: ADULT DIET; Regular; 4 carb choices (60 gm/meal); Low Fat/Low Chol/High Fiber/2 gm Na   Sodium controlled diet Yes  Fluid restriction daily ordered (fluid restriction recommended if patient is hyponatremic and/or diuretic is initiated or increased) No  FR:   Daily Weights: Patient Vitals for the past 96 hrs (Last 3 readings):   Weight   02/06/23 0440 160 lb 1.6 oz (72.6 kg)   02/05/23 0642 158 lb 1.6 oz (71.7 kg)   02/04/23 1459 150 lb (68 kg)     I/O:   Intake/Output Summary (Last 24 hours) at 2/6/2023 1444  Last data filed at 2/6/2023 3633  Gross per 24 hour   Intake 420 ml   Output 400 ml   Net 20 ml              We reviewed the introduction to Heart Failure, the HF zones, signs and symptoms to report on day 1 of onset, medications, medication compliance, the importance of obtaining daily weights, following a low sodium diet, reading food labels for the sodium content, keeping physician appointments, and smoking cessation. We discussed writing / tracking daily weights on a calendar / log because a 5 pound gain in 1 week can sneak up if you are not tracking it. I advised patient they can reduce the risk for Heart Failure exacerbations by modifying / controlling the risk factors.  We discussed self-managed care which includes the following:  to take medications as prescribed, report any intolerable side effects of medications to the cardiologist / doctor, do not just stop taking the medication; follow a cardiac heart healthy / low sodium diet; weigh yourself daily, exercise regularly- per doctor recommendation and not to smoke or use an excess amount of alcohol. We discussed calling the cardiologist / doctor with a weight gain of 3 pounds in one day or a total of 5 pounds or more in one week. Also, if you should have a significant weight loss of 3# or more in one day to call the doctor, they may need to decrease or hold the diuretic dose. On days you feels nauseated and not eating / drinking, having emesis or diarrhea,  informed to call the cardiologist  / doctor, they may need to decrease or hold diuretic to avoid dehydration. I stressed the importance of informing their cardiologist the first day of onset of any of the signs and symptoms in the \"Yellow Zone\" of the HF Zones. Patient verbalizes understanding. Greater than 30 minutes was spent educating patient. The Heart Failure Booklet given to the patient with additional patient education addressing:  What is Heart Failure? Things You Can Do to Live Well with HF  How to Take Your Medications  How to Eat Less Salt  [place] its Salt?   Exercising Well with Heart Failure  Signs and symptoms of HF to report  Weight Yourself Each Day  Home Self Management- activity, weight tracking, taking medications as prescribed, meals /diet planning (sodium and fluid restriction), how to read food labels, keeping physician follow ups, smoking cessation, follow the Heart Failure Zones  The Heart Failure zones  Every Dose Every Day      Instructed  to call 911 if you have any of the following symptoms:       Struggling to breathe unrelieved with rest,     Having chest pain     Have confusion or cant think clearly          Cindy Rowe RN BSN, RN  Heart Failure Navigator        CONGESTIVE HEART FAILURE (CHF) AHA GUIDELINES  (Must be completed for Primary Diagnosis CHF or History of CHF)    Discharge Plan:  I placed the Heart Failure Home Instructions in patient's discharge instructions. Per Heart Failure GWTG, the patient should have a follow-up appointment made within 7 days of discharge. New Diagnosis No    ECHO Results most recent:  Lab Results   Component Value Date    LVEF 60 2022                                        Social History     Tobacco Use   Smoking Status Former    Packs/day: 0.50    Years: 25.00    Pack years: 12.50    Types: Cigarettes    Start date:     Quit date: 9/10/1996    Years since quittin.4   Smokeless Tobacco Never   Tobacco Comments    Patient quit smoking 26 yrs.ago.         Immunization History   Administered Date(s) Administered    COVID-19, MODERNA BLUE border, Primary or Immunocompromised, (age 12y+), IM, 100 mcg/0.5mL 2021, 2021, 2022    COVID-19, PFIZER Bivalent BOOSTER, DO NOT Dilute, (age 12y+), IM, 30 mcg/0.3 mL 2022    INFLUENZA, INTRADERMAL, QUADRIVALENT, PRESERVATIVE FREE 10/03/2019    Influenza 10/26/2011    Influenza A ([id number]) Vaccine PF IM 2009    Influenza Vaccine, unspecified formulation 10/26/2011, 2013, 2016, 2018    Influenza Virus Vaccine 10/01/2010, 2012, 2013, 2015, 10/12/2020    Influenza, AFLURIA (age 1 yrs+), FLUZONE, (age 10 mo+), MDV, 0.5mL 2016    Influenza, FLUARIX, FLULAVAL, FLUZONE (age 10 mo+) AND AFLURIA, (age 1 y+), PF, 0.5mL 2018, 10/28/2019, 10/12/2020, 2021    Influenza, FLUCELVAX, (age 10 mo+), MDCK, MDV, 0.5mL 2017    Influenza, Intradermal, Preservative free 2014    MMR 2019    Pneumococcal Conjugate Vaccine 10/06/2015    Pneumococcal Polysaccharide ([id number]) 2010, 2017, 2022    Tdap (Boostrix, Adacel) 2010    Zoster Recombinant (Shingrix) 10/12/2020, 10/12/2020, 08/03/2021          Angiotensin-Converting-Enzyme (ACE) inhibitor ordered:  [] Yes  [x] No (specify contraindication):    [] Contraindicated  [] Hypotensive patient who was at immediate risk of cardiogenic shock  [] Hospitalized patient who experienced marked azotemia  [] Other Contraindications  [] Not Eligible  [] Not Tolerant  [] Patient Reason  [] System Reason  [] Other Reason    Angiotensin II receptor blockers (ARB) ordered:  [] Yes  [x] No (specify contraindication):    [] Contraindicated  [] Hypotensive patient who was at immediate risk of cardiogenic shock  [] Hospitalized patient who experienced marked azotemia  [] Other Contraindications    ARNI - Angiotensin Receptor Neprilysin Inhibitor ordered:  [] Yes  [x] No (specify contraindication):    [] ACE inhibitor use within the prior 36 hours  [] Allergy  [] Hyperkalemia  [] Hypotension  [] Renal dysfunction defined as creatinine > 2.5 mg/dL in men or > 2.0 mg/dL in women  [] Other Contraindications  [] Not Eligible  [] Not Tolerant  [] Patient Reason  []System Reason  []Other Reason      Beta Blocker ordered:    [x] Yes Lopressor  [] No (specify contraindication):    [] Contraindicated  [] Asthma  [] Fluid Overload  [] Low Blood Pressure  [] Patient recently treated with an intravenous positive inotropic agent  [] Other Contraindications  [] Not Eligible  [] Not Tolerant  [] Patient Reason  [] System Reason    SGLT2 Inhibitor ordered:  [] Yes  [x] No (specify contraindication):    [] Contraindicated  [] Patient currently on dialysis  [] Ketoacidosis  [] Known hypersensitivity to the medication  [] Type I diabetes (not approved for use in patients with Type I diabetes due to increased risk of ketoacidosis)  [] Other Contraindications  [] Not Eligible  [] Not Tolerant  [] Patient Reason  [] System Reason  [] Other Reason    Aldosterone Antagonist ordered:  [] Yes  [x] No (specify contraindication):    [] Contraindicated  [] Allergy due to aldosterone receptor antagonist  [] Hyperkalemia  [] Renal dysfunction defined as creatinine >2.5 mg/dL in men or >2.0 mg/dL in women.   [] Other contraindications  [] Not Eligible  [] Not Tolerant  [] Patient Reason  [] System Reason  [] Other Reason

## 2023-02-06 NOTE — CARE COORDINATION
Transition of care: Wound care following. Met with pt in room. Pt lives with her , Lidia Pace, in a ranch style home. Has ramped entrance to home. Needs assistance x 1 with bathing. Pt has home care aide 2 x's a week for a few hours through [facility]. Lidia Pace provides transportation. [place]- hospital bed, Life Alert button, BSC, shower chair, [facility], wheelchair, electric scooter, oxygen at 6l/nc from [employer], CPAP and pulse ox. Meal delivery with [employer]. Hx of triny at [facility] and [facility]. Plan is to return home at discharge. Pt active with [facility] and would like to continue with them at [place]. Confirmed with Keena at [facility] they are active with pt. PCP is Dr. Nkechi Yanez and pharmacy is [facility]. Sw/cm will follow. Case Management Assessment  Initial Evaluation    Date/Time of Evaluation: 2/6/2023 2:53 PM  Assessment Completed by: Bobo Powers RN    If patient is discharged prior to next notation, then this note serves as note for discharge by case management. Patient Name: Tacho Rucker                   YOB: 1955  Diagnosis: Acute respiratory failure with hypoxia ([facility] [address] [J96.01]  Acute congestive heart failure, unspecified heart failure type [facility]) [I50.9]                   Date / Time: 2/4/2023  2:51 PM    Patient Admission Status: Inpatient   Readmission Risk (Low < 19, Mod (19-27), High > 27): Readmission Risk Score: 21.1    Current PCP: Titus Thomas MD  PCP verified by CM? Yes    Chart Reviewed: Yes      History Provided by: Patient  Patient Orientation: Alert and Oriented    Patient Cognition: Alert    Hospitalization in the last 30 days (Readmission):  No    If yes, Readmission Assessment in  Navigator will be completed.     Advance Directives:      Code Status: Full Code   Patient's Primary Decision Maker is:      Primary Decision Maker: [address] [place] [address] [phone]    Discharge Planning:    Patient lives with: Spouse/Significant Other Type of Home: House  Primary Care Giver: Family (Pt's  and home care aide through [facility].)  Patient Support Systems include: Spouse/Significant Other, Family Members   Current Financial resources:    Current community resources:    Current services prior to admission: Home Care            Current DME:              Type of Home Care services:  [employer], Nursing Services, PT    ADLS  Prior functional level: Assistance with the following:, Bathing, Housework  Current functional level: Assistance with the following:, Bathing, Dressing, Toileting      Family can provide assistance at DC: Yes (Pt active with [facility])  Would you like Case Management to discuss the discharge plan with any other family members/significant others, and if so, who? No  Plans to Return to Present Housing: Yes  Potential Assistance needed at discharge: [facility] DME:    Patient expects to discharge to: [address] [place] [address] for transportation at discharge:      Financial    Payor: Maxime Bejarano / Plan: July Esteban / Product Type: *No Product type* /       Potential assistance Purchasing Medications:    Meds-to-Beds request: Yes      [facility]  [address] [facility]  [facility] [address]  Phone: [phone] Fax: [phone]      The Plan for Transition of Care is related to the following treatment goals of Acute respiratory failure with hypoxia ([address] [J96.01]  Acute congestive heart failure, unspecified heart failure type ([address] [U37.5]    IF APPLICABLE: The Patient and/or patient representative Sally Carter and her family were provided with a choice of provider and agrees with the discharge plan.  Freedom of choice list with basic dialogue that supports the patient's individualized plan of care/goals and shares the quality data associated with the providers was provided to:     Patient     The Patient and/or Patient Representative Agree with the Discharge Plan?  Yes    Heath Almonte RN  Case Management Department  Ph: [phone]

## 2023-02-07 LAB
ANION GAP SERPL CALCULATED.3IONS-SCNC: 10 MMOL/L (ref 7–16)
ANISOCYTOSIS: ABNORMAL
BASOPHILS ABSOLUTE: 0 E9/L (ref 0–0.2)
BASOPHILS RELATIVE PERCENT: 0 % (ref 0–2)
BUN BLDV-MCNC: 19 MG/DL (ref 6–23)
CALCIUM SERPL-MCNC: 8.8 MG/DL (ref 8.6–10.2)
CHLORIDE BLD-SCNC: 99 MMOL/L (ref 98–107)
CO2: 34 MMOL/L (ref 22–29)
CREAT SERPL-MCNC: 0.8 MG/DL (ref 0.5–1)
EOSINOPHILS ABSOLUTE: 0 E9/L (ref 0.05–0.5)
EOSINOPHILS RELATIVE PERCENT: 0.2 % (ref 0–6)
GFR SERPL CREATININE-BSD FRML MDRD: >60 ML/MIN/1.73
GLUCOSE BLD-MCNC: 75 MG/DL (ref 74–99)
HCT VFR BLD CALC: 32.9 % (ref 34–48)
HEMOGLOBIN: 10.1 G/DL (ref 11.5–15.5)
HYPOCHROMIA: ABNORMAL
LYMPHOCYTES ABSOLUTE: 0.14 E9/L (ref 1.5–4)
LYMPHOCYTES RELATIVE PERCENT: 2.6 % (ref 20–42)
MCH RBC QN AUTO: 25.8 PG (ref 26–35)
MCHC RBC AUTO-ENTMCNC: 30.7 % (ref 32–34.5)
MCV RBC AUTO: 83.9 FL (ref 80–99.9)
MONOCYTES ABSOLUTE: 0.32 E9/L (ref 0.1–0.95)
MONOCYTES RELATIVE PERCENT: 7 % (ref 2–12)
NEUTROPHILS ABSOLUTE: 4.14 E9/L (ref 1.8–7.3)
NEUTROPHILS RELATIVE PERCENT: 90.4 % (ref 43–80)
PDW BLD-RTO: 16.6 FL (ref 11.5–15)
PLATELET # BLD: 122 E9/L (ref 130–450)
PMV BLD AUTO: 12.4 FL (ref 7–12)
POIKILOCYTES: ABNORMAL
POTASSIUM SERPL-SCNC: 3.8 MMOL/L (ref 3.5–5)
PRO-BNP: 5393 PG/ML (ref 0–125)
RBC # BLD: 3.92 E12/L (ref 3.5–5.5)
SCHISTOCYTES: ABNORMAL
SODIUM BLD-SCNC: 143 MMOL/L (ref 132–146)
TARGET CELLS: ABNORMAL
WBC # BLD: 4.6 E9/L (ref 4.5–11.5)

## 2023-02-07 PROCEDURE — 6370000000 HC RX 637 (ALT 250 FOR IP): Performed by: FAMILY MEDICINE

## 2023-02-07 PROCEDURE — 97535 SELF CARE MNGMENT TRAINING: CPT

## 2023-02-07 PROCEDURE — 99232 SBSQ HOSP IP/OBS MODERATE 35: CPT | Performed by: FAMILY MEDICINE

## 2023-02-07 PROCEDURE — 2700000000 HC OXYGEN THERAPY PER DAY

## 2023-02-07 PROCEDURE — 36415 COLL VENOUS BLD VENIPUNCTURE: CPT

## 2023-02-07 PROCEDURE — 85025 COMPLETE CBC W/AUTO DIFF WBC: CPT

## 2023-02-07 PROCEDURE — 2140000000 HC CCU INTERMEDIATE R&B

## 2023-02-07 PROCEDURE — 94660 CPAP INITIATION&MGMT: CPT

## 2023-02-07 PROCEDURE — 97165 OT EVAL LOW COMPLEX 30 MIN: CPT

## 2023-02-07 PROCEDURE — 6360000002 HC RX W HCPCS: Performed by: FAMILY MEDICINE

## 2023-02-07 PROCEDURE — 94640 AIRWAY INHALATION TREATMENT: CPT

## 2023-02-07 PROCEDURE — 80048 BASIC METABOLIC PNL TOTAL CA: CPT

## 2023-02-07 PROCEDURE — 97530 THERAPEUTIC ACTIVITIES: CPT

## 2023-02-07 PROCEDURE — 83880 ASSAY OF NATRIURETIC PEPTIDE: CPT

## 2023-02-07 PROCEDURE — 97161 PT EVAL LOW COMPLEX 20 MIN: CPT

## 2023-02-07 RX ORDER — METOPROLOL TARTRATE 50 MG/1
150 TABLET, FILM COATED ORAL ONCE
Status: COMPLETED | OUTPATIENT
Start: 2023-02-07 | End: 2023-02-07

## 2023-02-07 RX ADMIN — OXYCODONE AND ACETAMINOPHEN 1 TABLET: 5; 325 TABLET ORAL at 09:35

## 2023-02-07 RX ADMIN — PETROLATUM: 420 OINTMENT TOPICAL at 20:37

## 2023-02-07 RX ADMIN — HYDRALAZINE HYDROCHLORIDE 10 MG: 10 TABLET, FILM COATED ORAL at 09:20

## 2023-02-07 RX ADMIN — ARFORMOTEROL TARTRATE 15 MCG: 15 SOLUTION RESPIRATORY (INHALATION) at 19:50

## 2023-02-07 RX ADMIN — DOCUSATE SODIUM 100 MG: 100 CAPSULE, LIQUID FILLED ORAL at 09:19

## 2023-02-07 RX ADMIN — ESCITALOPRAM 10 MG: 10 TABLET, FILM COATED ORAL at 09:24

## 2023-02-07 RX ADMIN — OXYCODONE AND ACETAMINOPHEN 1 TABLET: 5; 325 TABLET ORAL at 20:48

## 2023-02-07 RX ADMIN — OMEGA-3-ACID ETHYL ESTERS 1 G: 1 CAPSULE, LIQUID FILLED ORAL at 09:19

## 2023-02-07 RX ADMIN — PETROLATUM: 420 OINTMENT TOPICAL at 09:33

## 2023-02-07 RX ADMIN — OXYCODONE AND ACETAMINOPHEN 1 TABLET: 5; 325 TABLET ORAL at 14:31

## 2023-02-07 RX ADMIN — DILTIAZEM HYDROCHLORIDE 30 MG: 30 TABLET, FILM COATED ORAL at 14:26

## 2023-02-07 RX ADMIN — METOPROLOL TARTRATE 150 MG: 50 TABLET ORAL at 09:20

## 2023-02-07 RX ADMIN — FUROSEMIDE 40 MG: 40 TABLET ORAL at 09:24

## 2023-02-07 RX ADMIN — DESMOPRESSIN ACETATE 200 MCG: 0.1 TABLET ORAL at 20:37

## 2023-02-07 RX ADMIN — ARFORMOTEROL TARTRATE 15 MCG: 15 SOLUTION RESPIRATORY (INHALATION) at 09:57

## 2023-02-07 RX ADMIN — DILTIAZEM HYDROCHLORIDE 30 MG: 30 TABLET, FILM COATED ORAL at 09:19

## 2023-02-07 RX ADMIN — ENOXAPARIN SODIUM 40 MG: 100 INJECTION SUBCUTANEOUS at 09:20

## 2023-02-07 RX ADMIN — HYDRALAZINE HYDROCHLORIDE 10 MG: 10 TABLET, FILM COATED ORAL at 20:38

## 2023-02-07 RX ADMIN — DOCUSATE SODIUM 100 MG: 100 CAPSULE, LIQUID FILLED ORAL at 20:38

## 2023-02-07 RX ADMIN — FERROUS SULFATE TAB 325 MG (65 MG ELEMENTAL FE) 325 MG: 325 (65 FE) TAB at 09:20

## 2023-02-07 RX ADMIN — DESMOPRESSIN ACETATE 200 MCG: 0.1 TABLET ORAL at 09:19

## 2023-02-07 RX ADMIN — FERROUS SULFATE TAB 325 MG (65 MG ELEMENTAL FE) 325 MG: 325 (65 FE) TAB at 20:37

## 2023-02-07 RX ADMIN — PREDNISONE 40 MG: 20 TABLET ORAL at 14:26

## 2023-02-07 RX ADMIN — LEVOTHYROXINE SODIUM 75 MCG: 0.03 TABLET ORAL at 09:34

## 2023-02-07 RX ADMIN — BUDESONIDE 500 MCG: 0.5 SUSPENSION RESPIRATORY (INHALATION) at 19:50

## 2023-02-07 RX ADMIN — METOPROLOL TARTRATE 150 MG: 50 TABLET ORAL at 20:38

## 2023-02-07 RX ADMIN — FAMOTIDINE 20 MG: 20 TABLET, FILM COATED ORAL at 09:20

## 2023-02-07 RX ADMIN — DILTIAZEM HYDROCHLORIDE 30 MG: 30 TABLET, FILM COATED ORAL at 20:37

## 2023-02-07 RX ADMIN — OMEGA-3-ACID ETHYL ESTERS 1 G: 1 CAPSULE, LIQUID FILLED ORAL at 20:38

## 2023-02-07 RX ADMIN — BUDESONIDE 500 MCG: 0.5 SUSPENSION RESPIRATORY (INHALATION) at 09:57

## 2023-02-07 ASSESSMENT — PAIN DESCRIPTION - LOCATION: LOCATION: GENERALIZED

## 2023-02-07 ASSESSMENT — PAIN SCALES - GENERAL
PAINLEVEL_OUTOF10: 7
PAINLEVEL_OUTOF10: 0
PAINLEVEL_OUTOF10: 0
PAINLEVEL_OUTOF10: 8

## 2023-02-07 ASSESSMENT — PAIN - FUNCTIONAL ASSESSMENT: PAIN_FUNCTIONAL_ASSESSMENT: ACTIVITIES ARE NOT PREVENTED

## 2023-02-07 ASSESSMENT — PAIN DESCRIPTION - DESCRIPTORS: DESCRIPTORS: ACHING;DISCOMFORT;SORE

## 2023-02-07 NOTE — PROGRESS NOTES
[facility]   Family Medicine Residency Program  Resident In-Patient Progress Note    S:  Hospital day: 3   Brief Synopsis: Baljit Rodriguez is a 78 y.o. female with PMH of COPD chronically on 4 to 5 L of O2, sarcoidosis, CHF, pulmonary hypertension, A. fib, chronic pain with chronic opioid use who presented to the ER for worsening shortness of breath and concern for COPD/CHF exacerbation starting a few days ago. Patient requiring between 8 to 10 L of oxygen in the ER, as well as BiPAP to help with symptoms. Pertinent labs in the ER included a proBNP 7730, troponin 17, 18 delta negative. Procalcitonin obtained overall to rule out pneumonia or any bacterial infection; 0.06. Chest x-ray revealing mild cardiomegaly with hazy bilateral airspace disease concerning for pneumonia versus CHF. CTA pulm with no acute emboli noted, bilateral chronic pleural effusions and parenchymal passes and areas of scarring. Patient was given 1 dose of Lasix IV 40 mg as well as DuoNeb's x3 ampoules and admitted for CHF/COPD exacerbation. Started on 40 mg prednisone for the next 5 days, if not improving can consider a BX though will hold off at this time. Ordered sputum culture and Gram stain, continued on Pulmicort Brovana and Pep flutter. Continue with Lasix 40 mg daily at this time, monitor input and output and daily weight. Patient seen and examined this AM.  She had no acute concerns overnight. Doing well with BiPAP overnight. Back to her home baseline of 6 L of oxygen. Anticipate discharge within 24 hours provided PT can see her.     Cont meds:   Scheduled meds:    [START ON 2/8/2023] metoprolol succinate  150 mg Oral Daily    white petrolatum   Topical BID    enoxaparin  40 mg SubCUTAneous Daily    desmopressin  200 mcg Oral BID    dilTIAZem  30 mg Oral TID    docusate sodium  100 mg Oral BID    escitalopram  10 mg Oral Daily    famotidine  20 mg Oral Daily    ferrous sulfate  325 mg Oral BID levothyroxine  75 mcg Oral Daily    omega-3 acid ethyl esters  1 g Oral BID    potassium chloride  20 mEq Oral Once per day on Mon Wed Fri    furosemide  40 mg Oral Daily    hydrALAZINE  10 mg Oral BID    budesonide  0.5 mg Nebulization BID    arformoterol tartrate  15 mcg Nebulization BID    predniSONE  40 mg Oral Lunch     PRN meds: hydrALAZINE, labetalol, guaiFENesin-dextromethorphan, sodium chloride, oxyCODONE-acetaminophen     I reviewed the patient's past medical and surgical history, Medications and Allergies. O:  BP (!) 146/107   Pulse 65   Temp 97 °F (36.1 °C) (Temporal)   Resp 18   Ht 5' (1.524 m)   Wt 157 lb 8 oz (71.4 kg)   LMP  (LMP Unknown)   SpO2 98%   BMI 30.76 kg/m²   24 hour I&O: I/O last 3 completed shifts: In: 780 [P.O.:780]  Out: 1900 [Urine:1900]  I/O this shift: In: 80 [P.O.:90]  Out: -       Physical Exam  Constitutional:       Appearance: She is not ill-appearing. Cardiovascular:      Pulses: Normal pulses. Heart sounds: Murmur heard. Comments: Irregularly irregular rhythm consistent with atrial fibrillation  Pulmonary:      Breath sounds: Wheezing present. Comments: On BiPAP, with lower lobe crackles and some expiratory wheezing noted  Chest:      Chest wall: Tenderness present. Abdominal:      General: Bowel sounds are normal. There is no distension. Palpations: Abdomen is soft. Tenderness: There is no abdominal tenderness. Comments: Chronic abdominal wound, dressed appropriately, no drainage noted   Musculoskeletal:         General: Normal range of motion. Right lower leg: No edema. Left lower leg: No edema. Skin:     General: Skin is warm. Coloration: Skin is not jaundiced. Findings: No bruising. Neurological:      Mental Status: She is alert and oriented to person, place, and time. Psychiatric:         Mood and Affect: Mood normal.         Behavior: Behavior normal.         Thought Content:  Thought content normal. Judgment: Judgment normal.     Labs:  Na/K/Cl/CO2:  143/3.8/99/34 (02/07 2097)  BUN/Cr/glu/ALT/AST/amyl/lip:  19/0.8/--/--/--/--/-- (02/07 2066)  WBC/Hgb/Hct/Plts:  4.6/10.1/32.9/122 (02/07 9488)  estimated creatinine clearance is 61 mL/min (based on SCr of 0.8 mg/dL). Other pertinent labs as noted below    Radiology:  XR CHEST (2 VW)   Final Result   No significant change. Findings raise the possibility of possible pulmonary   hypertension         CTA PULMONARY W CONTRAST   Final Result   1. Signs of right-sided failure causing lack of proper opacification of the   pulmonary artery circulation including opacification of the more distal   subsegmental branches particular for the lower lobes with lack contrast   opacification of the pulmonary venous return. 2.  No sizable central pulmonary embolus seen but there are flow phenomena   causing solid filling defects in the central pulmonary arteries. See above   comments and recommendations. 3.  Chronic bilateral pleural effusions. 4.  Chronic bi basilar parenchymal passes and areas of scar in the lung   parenchyma. Cannot exclude component of chronic pneumonia or aspiration in   the left lower lobe. RECOMMENDATIONS:   Unavailable         XR CHEST PORTABLE   Final Result   1. Mild cardiomegaly with findings of hazy bilateral airspace disease which   could represent CHF or possibly pneumonia in the proper clinical setting. A/P:  Principal Problem:    Acute respiratory failure with hypoxia (HCC)  Resolved Problems:    * No resolved hospital problems.  *    Concern for CHF exacerbation  Hx HFpEF  Last echo with ejection fraction greater than 60%  proBNP elevated greater than 7000 this presentation, patient's baseline anywhere between 8050-3566  Patient has a history of noncompliance/nonadherence with medications  Supposed to be taking Lasix 40 mg daily at home  Patient does follow nephrology outpatient  Continue with Lasix 40 mg daily  Strict input output  Daily weights  CHF nurse consult  Monitor kidney function     Concern for COPD exacerbation  Patient has not smoked in the past 23 years  She has history of sarcoidosis, which may be worsening? Recommend close outpatient follow-up for this. Prednisone 40 mg x5 days  Pro-Paul 0.06, monitor off of antibiotics for now  Follow-up sputum culture and Gram stain  Continue Pulmicort, Brovana and Pep flutter  Continue BiPAP, monitor O2 requirements, wean down to baseline as appropriate     Hx A.  Fib  Patient does not tolerate Eliquis well  Continue with Lovenox for now  Lopressor 150 mg daily for rate control, continue Cardizem 30 mg 3 times daily     History of sarcoidosis  Patient has not had any recent follow-up with pulm  Last visit 8/11/2022; at that time patient was recommended to get PFTs ; revealing both restrictive and obstructive disease  Patient completed sleep study as appropriate     Hx diabetes insipidus  Continue home dose desmopressin     Hx hypothyroidism  Continue home dose levothyroxine 75 mcg daily    Hx GERD  Continue home dose Pepcid     Hx depression  Continue home dose Lexapro 10 mg daily     History of chronic pain with chronic opioid use  Continue to wean down opioids  Percocet every 6 to 8 hours as needed    GI/DVT ppx: Lovenox and Protonix  Diet: Diabetic diet    Electronically signed by Joshua Aguilar MD  PGY-[id number] on 2/7/2023 at 12:55 PM  This case was discussed with attending physician: Dr. Elizabeth Moseley

## 2023-02-07 NOTE — PROGRESS NOTES
Comprehensive Nutrition Assessment    Type and Reason for Visit:  Wound, Initial, Positive Nutrition Screen    Nutrition Recommendations/Plan:   Recommend and start Ensure Plus supplement BID (pt requesting Ensure) and Micky wound healing supplement BID to help meet increased nutritional needs from wound healing. Malnutrition Assessment:  Malnutrition Status: At risk for malnutrition (Comment) (02/07/23 1120)    Context:  Acute Illness     Findings of the 6 clinical characteristics of malnutrition:  Energy Intake:  Mild decrease in energy intake (Comment) (since admission)  Weight Loss:  Unable to assess (d/t possible fluid shifts ; hx of CHF)     Body Fat Loss:  No significant body fat loss     Muscle Mass Loss:  No significant muscle mass loss    Fluid Accumulation:  No significant fluid accumulation     Strength:  Not Performed    Nutrition Assessment:    Pt was adm for chest pain and SOB ; noted acute respiratory failure with hypoxia ; adm w/ chronic abd wound ; PMHx includes DM, CHF, COPD, pulmonary HTN, GERD, sarcoidosis, diverticulitis, CKD, CAD ; will start ONS    Nutrition Related Findings:    Oriented x4, wound (abdomen, non-healing surgical), BS+, dentures (upper and lower), bipap, rounded/tender abd ; Wound Type: Surgical Incision, Open Wounds, Partial Thickness (non-healing surgical incision x 1 noted to abd)       Current Nutrition Intake & Therapies:    Average Meal Intake: 51-75%     ADULT DIET; Regular; 4 carb choices (60 gm/meal); Low Fat/Low Chol/High Fiber/2 gm Na    Anthropometric Measures:  Height: 5' (152.4 cm)  Ideal Body Weight (IBW): 100 lbs (45 kg)    Admission Body Weight: 158 lb 1.6 oz (71.7 kg) (2/5, bedscale)  Current Body Weight: 157 lb 8 oz (71.4 kg) (2/7, bedscale), 157.5 % IBW.  Weight Source: Bed Scale  Current BMI (kg/m2): 30.8  Usual Body Weight:  (UTO ; EMR shows past weights of 167# bedscale on 10/4/22 and 177# actual on 9/29/22)     Weight Adjustment For: No Adjustment                 BMI Categories: Obese Class 1 (BMI 30.0-34. 9)    Estimated Daily Nutrient Needs:  Energy Requirements Based On: Formula  Weight Used for Energy Requirements: Current  Energy (kcal/day): 5845-3095 (REE 1175 x 1.2-1.3 SF)  Weight Used for Protein Requirements: Ideal  Protein (g/day): 70-85 (1.5-1.8g/kg IBW)  Method Used for Fluid Requirements: 1 ml/kcal  Fluid (ml/day): 2676-2954    Nutrition Diagnosis:   Increased nutrient needs related to increase demand for energy/nutrients as evidenced by wounds    Nutrition Interventions:   Food and/or Nutrient Delivery: Start Oral Nutrition Supplement, Continue Current Diet  Nutrition Education/Counseling: Education not indicated  Coordination of Nutrition Care: Continue to monitor while inpatient       Goals:  Previous Goal Met: Progressing toward Goal(s)  Goals: PO intake 75% or greater, by next RD assessment       Nutrition Monitoring and Evaluation:   Behavioral-Environmental Outcomes: None Identified  Food/Nutrient Intake Outcomes: Food and Nutrient Intake, Supplement Intake  Physical Signs/Symptoms Outcomes: Biochemical Data, Chewing or Swallowing, GI Status, Fluid Status or Edema, Hemodynamic Status, Meal Time Behavior, Nutrition Focused Physical Findings, Skin, Weight    Discharge Planning:     Too soon to determine     Nena Rock RD, LD  Contact: [phone]

## 2023-02-07 NOTE — PROGRESS NOTES
Physical Therapy  Physical Therapy Initial Assessment       Name: Osiris Monahan  : 1955  MRN: [id number]      Date of Service: 2023    Evaluating PT:  Ha Piage PT, DPT  [id number]    Room #:  7663/6490-X  Diagnosis:  Acute respiratory failure with hypoxia ([address] [J96.01]  Acute congestive heart failure, unspecified heart failure type ([address] [I50.9]  PMHx/PSHx:   has a past medical history of A-fib ([address] Able to transfer from chair to wheelchair, Abnormal mammogram, Acute on chronic respiratory failure ([address] Anemia, Anemia due to chronic illness, Ankle fracture, left, 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[address] Backache, Benign hypertension, CHF (congestive heart failure) ([address] Chronic back pain, Chronic kidney disease, Chronic pain disorder, Chronic, continuous use of opioids, Compression fracture of thoracic vertebra ([address] COPD (chronic obstructive pulmonary disease) ([address] Debility, Deformity of ankle and foot, acquired, Depression, Diabetes insipidus ([address] Diverticulitis, Dry eyes, Encephalopathy acute, Gait disturbance, GERD (gastroesophageal reflux disease), Hemorrhoids, Hernia, History of blood transfusion, History of Clostridium difficile, Hyperlipidemia, Hypothyroidism, Ischemic colitis, enteritis, or enterocolitis ([address] Long term (current) use of systemic steroids, Long-term current use of steroids, Lumbar disc herniation, Myalgia and myositis, unspecified, Nephrosclerosis, Nonunion of fracture, Osteoarthritis, PAF (paroxysmal atrial fibrillation) ([address] 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[address] Pulmonary hypertension ([address] Pulmonary sarcoidosis ([address] Rectal bleeding, Rhabdomyolysis, Steroid-induced avascular necrosis of shoulder ([address] Tibia fracture, and Ventral hernia without obstruction or gangrene. Procedure/Surgery:    Precautions:  Falls, 5 L O2  Equipment Needs:      SUBJECTIVE:    Pt lives with spouse in a 1 story home with ramped entry.  Pt has HHA 2x/week to assist with ADL/IADL . Pt states her spouse assisted her to w/c PTA. Equipment Owned:   [facility] bed  [facility]  [employer]  W/c  Electric Scooter    OBJECTIVE:   Initial Evaluation  Date: 2/7/23 Treatment Short Term/ Long Term   Goals   AM-PAC 6 Clicks 83/73     Was pt agreeable to Eval/treatment? Yes     Does pt have pain? No c/o pain     Bed Mobility  Rolling: SBA  Supine to sit: SBA  Sit to supine: NT  Scooting: SBA  Rolling: Independent    Supine to sit: Independent    Sit to supine: Independent    Scooting: Independent     Transfers Sit to stand: SBA  Stand to sit: SBA  Stand pivot: SBA [employer]  Sit to stand: Modified Independent    Stand to sit: Modified Independent    Stand pivot: Modified Independent     Ambulation    3 feet with SBA WW   25 feet with SBA AAD   Stair negotiation: ascended and descended  NT  Not a goal of care   ROM BUE:  Defer to OT  BLE:  WFL     Strength BUE:  Defer to OT  BLE:  3/5 hip and knee  L ankle 1/5  R ankle 3/5  Improve 1 MMT   Balance Sitting EOB:  SBA  Dynamic Standing:  SBA [employer]  Sitting EOB:  Independent    Dynamic Standing:  Modified Independent       Pt is A & O x 4  Sensation:  WNl  Edema: WNL    Vitals:    HR 80  Spo2 98%  PRE/ POST  HR 85  Spo2 89%   ACTIVITY        Therapeutic Exercises:  Functional mobility    Patient education  Pt educated on role of PT    Patient response to education:   Pt verbalized understanding Pt demonstrated skill Pt requires further education in this area   x x x     ASSESSMENT:    Conditions Requiring Skilled Therapeutic Intervention:    [x]Decreased strength     [x]Decreased ROM  [x]Decreased functional mobility  [x]Decreased balance   [x]Decreased endurance   []Decreased posture  []Decreased sensation  []Decreased coordination   []Decreased vision  [x]Decreased safety awareness   []Increased pain       Comments:  Pt agreeable to PT evaluation. Pt performing bed mobility without assist. Pt performing transfers and ambulation without assist. Pt gait steady no LOB.  Distance limited by spo2 decrease and fatigue. Patient would benefit from continued skilled PT to maximize functional mobility independence. Treatment:  Patient practiced and was instructed in the following treatment:    Bed mobility- verbal cues to facilitate independence  Functional transfers- Verbal cues for proper positioning and sequencing to perform transfers safely with maximum independence. Gait training-Verbal cues for proper positioning and sequencing using assistive device to maximize functional mobility independence. Pt's/ family goals   1. Get better    Prognosis is good for reaching above PT goals. Patient and or family understand(s) diagnosis, prognosis, and plan of care.   yes    PHYSICAL THERAPY PLAN OF CARE:    PT POC is established based on physician order and patient diagnosis     Referring provider/PT Order:    02/07/23 0915  PT eval and treat  Start:  02/07/23 0915,   End:  02/07/23 0915,   ONE TIME,   Standing Count:  1 Occurrences,   R         [address] Golden Leyden, MD     Diagnosis:  Acute respiratory failure with hypoxia ([address] [J96.01]  Acute congestive heart failure, unspecified heart failure type ([address] [I50.9]  Specific instructions for next treatment:  Functional mobility    Current Treatment Recommendations:     [x] Strengthening to improve independence with functional mobility   [x] ROM to improve independence with functional mobility   [x] Balance Training to improve static/dynamic balance and to reduce fall risk  [x] Endurance Training to improve activity tolerance during functional mobility   [x] Transfer Training to improve safety and independence with all functional transfers   [x] Gait Training to improve gait mechanics, endurance and assess need for appropriate assistive device  [x] Stair Training in preparation for safe discharge home and/or into the community   [x] Positioning to prevent skin breakdown and contractures  [x] Safety and Education Training   [x] Patient/Caregiver Education   [x] HEP  [] Other     PT long term treatment goals are located in above grid    Frequency of treatments: 2-5x/week x 1-2 weeks. Time in  1020  Time out  1040    Total Treatment Time  8 minutes     Evaluation Time includes thorough review of current medical information, gathering information on past medical history/social history and prior level of function, completion of standardized testing/informal observation of tasks, assessment of data and education on plan of care and goals.     CPT codes:  [x] Low Complexity PT evaluation 75511  [] Moderate Complexity PT evaluation 32261  [] High Complexity PT evaluation 13107  [] PT Re-evaluation 20174  [] Gait training 36669 0 minutes  [] Manual therapy 59539 0 minutes  [x] Therapeutic activities 93995 8 minutes  [] Therapeutic exercises 71484 0 minutes  [] Neuromuscular reeducation 62237 0 minutes       Tejinder Avina PT, DPT   [id number]

## 2023-02-07 NOTE — CARE COORDINATION
Spoke with KELI with [facility] and they are active with pt for skilled nursing only. They will be able to do pt/ot if ordered at discharge. PT eval am-pac 14/24 ambulated 3ft with SABINE CARMONA. OT eval am-pac 17/24. Met with pt in room. Plan remains home with her  and [facility]. Pt is aware [facility] can do pt/ot at home. Pt does not want to go to rehab. Voiced no other needs at present. Pt has home o2 at 6l/nc from [facility]. Sw/cm will follow.

## 2023-02-07 NOTE — PLAN OF CARE
Problem: Chronic Conditions and Co-morbidities  Goal: Patient's chronic conditions and co-morbidity symptoms are monitored and maintained or improved  2/6/2023 2311 by Lisa Prieto RN  Outcome: Progressing  2/6/2023 1510 by Gabriella Babb RN  Outcome: Progressing     Problem: Safety - Adult  Goal: Free from fall injury  2/6/2023 2311 by Lisa Prieto RN  Outcome: Progressing  2/6/2023 1510 by Gabriella Babb RN  Outcome: Progressing     Problem: Pain  Goal: Verbalizes/displays adequate comfort level or baseline comfort level  2/6/2023 2311 by Lisa Prieto RN  Outcome: Progressing  2/6/2023 1510 by Gabriella Babb RN  Outcome: Progressing  Flowsheets (Taken 2/6/2023 0345 by Kristy Ridley RN)  Verbalizes/displays adequate comfort level or baseline comfort level: Encourage patient to monitor pain and request assistance     Problem: ABCDS Injury Assessment  Goal: Absence of physical injury  2/6/2023 2311 by Lisa Prieto RN  Outcome: Progressing  2/6/2023 1510 by Gabriella Babb RN  Outcome: Progressing

## 2023-02-07 NOTE — PROGRESS NOTES
[facility]  Family Medicine Attending    S: 78 y.o. female with COPD chronically on 4-5 L of O2, sarcoidosis, CHF, and pulmonary HTN, sarcoidosis as well as afib, chronic pain with opioid use who was admitted for COPD/CHF exacerbation. She was placed on BiPAP in the ED to help her low oxygenation. Since admission, Sats have been improving and she is back to baseline oxygen  Today, she is sitting up. She is witty and joking with our FM team.  She notes that her breathing is continuing to improve. O: VS- Blood pressure (!) 146/107, pulse 65, temperature 97 °F (36.1 °C), temperature source Temporal, resp. rate 18, height 5' (1.524 m), weight 157 lb 8 oz (71.4 kg), SpO2 98 %, not currently breastfeeding. Exam is as noted by resident with the following changes, additions or corrections:  Gen:  awake and oriented x 3, pleasant  CVS-irreg irreg  Lungs-bilateral wheeze and crackles at the bases improving  Ext-there is bony abnormality on the left medial malleolus; no edema bilaterally      Impressions:  Acute respiratory failure with hypoxia (HCC)  Chronic afib  Chf exacerbation  HFpEF  Copd exacerbation  Sarcoidosis  Diabetes insipidus  Hypothyroidism  Generalized weakness-[facility] 14/24-patient refusing placement; will order PT at discharge as she is already enrolled in home health for skilled nursing  Hypertension    Plan:     Lasix 40 mg daily  Daily weights (I&O)  -1550 mL since admission  Repeat CXR no significant change  Consider pulm consult if breating is not improving with nebs and pep flutter  Wean bipap as tolerated. Steroids-prednisone 40 mg daily x 5 days  Ddavp  Continue home dose of levothyroxine  Continue cardizem for afib. Change lopressor to toprol xl    Dispo:  likely discharge tomorrow with home PT     Attending Physician Statement  I have reviewed the chart and seen the patient with the resident(s). I personally reviewed images, EKG's and similar tests, if present.   I personally reviewed and performed key elements of the history and exam.  I have reviewed and confirmed student and/or resident history and exam with changes as indicated above. I agree with the assessment, plan and orders as documented by the resident. Please refer to the resident progress note today  for additional information.       Aaron Lange MD

## 2023-02-07 NOTE — PATIENT CARE CONFERENCE
P Quality Flow/Interdisciplinary Rounds Progress Note        Quality Flow Rounds held on February 7, 2023    Disciplines Attending:  Bedside Nurse, , , and Nursing Unit Leadership    Arnol Chambers was admitted on 2/4/2023  2:51 PM    Anticipated Discharge Date:       Disposition:    Blas Score:  Blas Scale Score: 19    Readmission Risk              Risk of Unplanned Readmission:  41           Discussed patient goal for the day, patient clinical progression, and barriers to discharge.   The following Goal(s) of the Day/Commitment(s) have been identified:  Labs - Report Results      Gerhard Libman, RN  February 7, 2023

## 2023-02-07 NOTE — PROGRESS NOTES
[address] [place] [address]        Date:2023                                                  Patient Name: Dorinda Warner    MRN: [id number]    : 1955    Room: [address] [place] [address]          Evaluating OT: Theron Christianson OTR/L; [id number]       Referring Provider: Desmond Guidry MD    Specific Provider Orders/Date: OT Eval and Treat 23      Diagnosis: Acute respiratory failure with hypoxia      Surgery: None this admission     Pertinent Medical History:  has a past medical history of A-fib ([address] Able to transfer from chair to wheelchair, Abnormal mammogram, Acute on chronic respiratory failure ([address] Anemia, Anemia due to chronic illness, Ankle fracture, left, 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[address] Backache, Benign hypertension, CHF (congestive heart failure) ([address] Chronic back pain, Chronic kidney disease, Chronic pain disorder, Chronic, continuous use of opioids, Compression fracture of thoracic vertebra (HCC), COPD (chronic obstructive pulmonary disease) ([address] Debility, Deformity of ankle and foot, acquired, Depression, Diabetes insipidus ([address] Diverticulitis, Dry eyes, Encephalopathy acute, Gait disturbance, GERD (gastroesophageal reflux disease), Hemorrhoids, Hernia, History of blood transfusion, History of Clostridium difficile, Hyperlipidemia, Hypothyroidism, Ischemic colitis, enteritis, or enterocolitis ([address] Long term (current) use of systemic steroids, Long-term current use of steroids, Lumbar disc herniation, Myalgia and myositis, unspecified, Nephrosclerosis, Nonunion of fracture, Osteoarthritis, PAF (paroxysmal atrial fibrillation) ([address] 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[address] Pulmonary hypertension ([address] Pulmonary sarcoidosis ([address] Rectal bleeding, Rhabdomyolysis, Steroid-induced avascular necrosis of shoulder ([address] Tibia fracture, and Ventral hernia without obstruction or gangrene.      Recommended Adaptive Equipment: TBD     Precautions:  Fall Risk, continuous pulse ox, O2, abdominal wound, purewick, h/o L foot external rotation     Assessment of current deficits    [x] Functional mobility  [x]ADLs  [x] Strength               [x]Cognition    [x] Functional transfers   [x] IADLs         [x] Safety Awareness   [x]Endurance    [x] Fine Coordination              [x] Balance      [] Vision/perception   []Sensation     []Gross Motor Coordination  [] ROM  [] Delirium                   [] Motor Control     OT PLAN OF CARE   OT POC based on physician orders, patient diagnosis and results of clinical assessment    Frequency/Duration 1-3 days/wk for 2 weeks PRN   Specific OT Treatment Interventions to include:   * Instruction/training on adapted ADL techniques and AE recommendations to increase functional independence within precautions       * Training on energy conservation strategies, correct breathing pattern and techniques to improve independence/tolerance for self-care routine  * Functional transfer/mobility training/DME recommendations for increased independence, safety, and fall prevention  * Patient/Family education to increase follow through with safety techniques and functional independence  * Recommendation of environmental modifications for increased safety with functional transfers/mobility and ADLs  * Therapeutic exercise to improve motor endurance, ROM, and functional strength for ADLs/functional transfers  * Therapeutic activities to facilitate/challenge dynamic balance, stand tolerance for increased safety and independence with ADLs  * Neuro-muscular re-education: facilitation of righting/equilibrium reactions, midline orientation, scapular stability/mobility, normalization of muscle tone, and facilitation of volitional active controled movement  * Positioning to improve skin integrity, interaction with environment and functional independence    Home Living: Pt lives with  in ranch style home with ramp entry. HHA 2x/week to assist with ADLs/IADLs PRN. Bathroom setup: Sponge baths   Equipment owned: hospital bed, BSC, ww, w/c, shower chair, electric scooter, O2    Prior Level of Function: assist PRN with ADLs , assist PRN with IADLs; engaged in functional mobility with use of ww short distances, w/c long distances. Driving: No  Occupation: none reported    Pain Level: Pt with no c/o pain during session. Cognition: A&O: 4/4; Follows 2 step directions   Memory:  Good   Sequencing:  Fair+   Problem solving:  Fair+   Judgement/safety:  Fair+     Functional Assessment:  AM-PAC Daily Activity Raw Score: 17/24   Initial Eval Status  Date: 2/7/23 Treatment Status  Date: STGs = LTGs  Time frame: 10-14 days   Feeding Setup   Independent    Grooming Stand by Assist   Modified [place]    UB Dressing Stand by Assist   Modified [place]    LB Dressing Minimal Assist   Modified [place]    Bathing Minimal Assist  Modified [place]    Toileting Moderate Assist   Modified [place]    Bed Mobility  Log Roll: SBA  Supine to sit: Stand by Assist   Sit to supine: NT   Supine to sit: Independent   Sit to supine: Independent    Functional Transfers Sit to stand:SBA   Stand to sit:SBA  Stand pivot: SBA  Commode: NT  Sit to stand:Modified [place]    Stand to sit:Modified [place]   Stand pivot: Modified [place]   Commode: Modified [place]     Functional Mobility SBA  Use of ww from bed to bedside chair. Modified [place] with use of Appropriate AD   Balance Sitting:     Static - Supervision     Dynamic - Supervision  Standing: SBA  Sitting:     Static: Independent     Dynamic: Independent  Standing: Modified [place]    Activity Tolerance Fair  O2 desat'ing with light activity.   Good   Visual/  Perceptual WFL        Safety Fair+  Good  during ADL completion     Hand Dominance Right   AROM (PROM) Strength Additional Info:  Goal:   RUE Shoulder flexion ~45 degrees, distally WFL. Shoulder 2+/5 distally 3+/5 grossly tested good  and wfl FMC/dexterity noted during ADL tasks   Improve overall RUE strength WFL for participation in functional tasks       LUE Shoulder flexion ~45 degrees, distally WFL. Shoulder 2+/5 distally 3+/5 grossly tested Good  and wfl FMC/dexterity noted during ADL tasks   Improve overall LUE strength WFL for participation in functional tasks       Hearing: [facility] [place]  Sensation:  No c/o numbness or tingling BUE  Tone: WFL BUE  Edema: Unremarkable    Comment: Cleared by RN to see pt. Upon arrival patient supine in bed and agreeable to OT session. At end of session, patient seated in bedside chair with call light and phone within reach, all lines and tubes intact. Overall patient demonstrated decreased independence and safety during completion of ADL/functional transfer/mobility tasks. Therapist facilitated ADL tasks, functional transfers, functional mobility, bed mobility to address safety awareness, implementation of fall prevention strategies, & engagement throughout functional tasks. Pt educated on EC/WS strategies to address activity tolerance & functional engagement throughout daily activities. Pt would benefit from continued skilled OT to increase safety and independence with completion of ADL/IADL tasks for functional independence and quality of life. Treatment: OT treatment provided this date includes: ADL-  Instruction/training on safety and adapted techniques for completion of ADLs: Pt sat EOB to engage in ADLs while addressing static/dynamic sitting balance, core strength/pelvic stability, & functional engagement. Pt able to don/doff socks seated EOB utilizing figure-4 technique while demo'g fair dynamic sitting balance. Pt educated on activity modifications/adaptations & EC/WS strategies to address activity tolerance/endurance throughout ADLs.    Mobility-  Instruction/training on safety and improved independence with bed mobility/functional transfers and functional mobility. Pt utilized ww to maintain static/dynamic standing balance throughout session. Pt demo'd slow андрей & narrow EMELINA throughout functional mobility. Sitting EOB ~8 minutes to improve dynamic sitting balance and activity tolerance during ADLs. Activity tolerance- Instruction/training on energy conservation/work simplification for completion of ADLs. Skilled positioning/alignment-  Proper Positioning/Alignment. Pt required light assist/cues to maintain proper body mechanics throughout session to promote skin/joint integrity, safety awareness, & implementation of fall prevention strategies throughout daily activities. Skilled monitoring of O2 sats-   Pt on 5L HFNC upon arrival. SpO2 at rest 93%, SpO2 during activity 89%, SpO2 at end of session 93%. Rehab Potential: Good for established goals     LTG: maximize independence with ADLs to return to PLOF    Patient and/or family were instructed on functional diagnosis, prognosis/goals and OT plan of care. Demonstrated fair understanding. Eval Complexity: Low  History: Expanded chart review of medical records and additional review of physical, cognitive, or psychosocial history related to current functional performance  Exam: 3+ performance deficits  Assistance/Modification: Min/mod assistance or modifications required to perform tasks. May have comorbidities that affect occupational performance. Evaluation time includes thorough review of current medical information, gathering information on past medical & social history & PLOF, completion of standardized testing, informal observation of tasks, consultation with other medical professions/disciplines, assessment of data & development of POC/goals.      Time In: 10:40a  Time Out: 11:03a  Total Treatment Time: 8 minutes    Min Units   OT Eval Low 29286  x     OT Eval Medium 14664      OT Eval High 98132      OT Re-Eval [id number]       Therapeutic Ex 89835       Therapeutic Activities 21397       ADL/Self Care 63481  8 1    Orthotic Management 90169       Manual [id number]     Neuro Re-Ed 03144       Non-Billable Time          Evaluation Time additionally includes thorough review of current medical information, gathering information on past medical history/social history and prior level of function, interpretation of standardized testing/informal observation of tasks, assessment of data and development of plan of care and goals.             Keyla Hunter OTR/L; [id number]

## 2023-02-07 NOTE — PROGRESS NOTES
Date: 2/6/2023    Time: 10:10 PM    Patient Placed On BIPAP/CPAP/ Non-Invasive Ventilation? No, pt already on Bipap. If no must comment. Facial area red/color change? No           If YES are Blister/Lesion present? No   If yes must notify nursing staff  BIPAP/CPAP skin barrier?   Yes    Skin barrier type:mepilexlite       Comments:        Jose De Jesus Sewell RCP

## 2023-02-08 VITALS
DIASTOLIC BLOOD PRESSURE: 85 MMHG | RESPIRATION RATE: 20 BRPM | OXYGEN SATURATION: 99 % | TEMPERATURE: 97.6 F | WEIGHT: 157.7 LBS | BODY MASS INDEX: 30.96 KG/M2 | HEIGHT: 60 IN | HEART RATE: 70 BPM | SYSTOLIC BLOOD PRESSURE: 154 MMHG

## 2023-02-08 PROBLEM — J96.01 ACUTE RESPIRATORY FAILURE WITH HYPOXIA (HCC): Status: RESOLVED | Noted: 2023-02-04 | Resolved: 2023-02-08

## 2023-02-08 LAB
ANION GAP SERPL CALCULATED.3IONS-SCNC: 8 MMOL/L (ref 7–16)
ANISOCYTOSIS: ABNORMAL
BASOPHILS ABSOLUTE: 0.01 E9/L (ref 0–0.2)
BASOPHILS RELATIVE PERCENT: 0.2 % (ref 0–2)
BUN BLDV-MCNC: 17 MG/DL (ref 6–23)
CALCIUM SERPL-MCNC: 8.8 MG/DL (ref 8.6–10.2)
CHLORIDE BLD-SCNC: 97 MMOL/L (ref 98–107)
CO2: 34 MMOL/L (ref 22–29)
CREAT SERPL-MCNC: 0.8 MG/DL (ref 0.5–1)
EOSINOPHILS ABSOLUTE: 0.03 E9/L (ref 0.05–0.5)
EOSINOPHILS RELATIVE PERCENT: 0.7 % (ref 0–6)
GFR SERPL CREATININE-BSD FRML MDRD: >60 ML/MIN/1.73
GLUCOSE BLD-MCNC: 78 MG/DL (ref 74–99)
HCT VFR BLD CALC: 33.7 % (ref 34–48)
HEMOGLOBIN: 10.6 G/DL (ref 11.5–15.5)
HYPOCHROMIA: ABNORMAL
IMMATURE GRANULOCYTES #: 0.02 E9/L
IMMATURE GRANULOCYTES %: 0.5 % (ref 0–5)
LYMPHOCYTES ABSOLUTE: 0.52 E9/L (ref 1.5–4)
LYMPHOCYTES RELATIVE PERCENT: 11.8 % (ref 20–42)
MCH RBC QN AUTO: 25.3 PG (ref 26–35)
MCHC RBC AUTO-ENTMCNC: 31.5 % (ref 32–34.5)
MCV RBC AUTO: 80.4 FL (ref 80–99.9)
MONOCYTES ABSOLUTE: 0.38 E9/L (ref 0.1–0.95)
MONOCYTES RELATIVE PERCENT: 8.6 % (ref 2–12)
NEUTROPHILS ABSOLUTE: 3.46 E9/L (ref 1.8–7.3)
NEUTROPHILS RELATIVE PERCENT: 78.2 % (ref 43–80)
OVALOCYTES: ABNORMAL
PDW BLD-RTO: 16.6 FL (ref 11.5–15)
PLATELET # BLD: 127 E9/L (ref 130–450)
PMV BLD AUTO: 11.6 FL (ref 7–12)
POIKILOCYTES: ABNORMAL
POLYCHROMASIA: ABNORMAL
POTASSIUM SERPL-SCNC: 3.5 MMOL/L (ref 3.5–5)
RBC # BLD: 4.19 E12/L (ref 3.5–5.5)
SODIUM BLD-SCNC: 139 MMOL/L (ref 132–146)
TARGET CELLS: ABNORMAL
WBC # BLD: 4.4 E9/L (ref 4.5–11.5)

## 2023-02-08 PROCEDURE — 6360000002 HC RX W HCPCS: Performed by: FAMILY MEDICINE

## 2023-02-08 PROCEDURE — 6370000000 HC RX 637 (ALT 250 FOR IP): Performed by: FAMILY MEDICINE

## 2023-02-08 PROCEDURE — 85025 COMPLETE CBC W/AUTO DIFF WBC: CPT

## 2023-02-08 PROCEDURE — 2700000000 HC OXYGEN THERAPY PER DAY

## 2023-02-08 PROCEDURE — 80048 BASIC METABOLIC PNL TOTAL CA: CPT

## 2023-02-08 PROCEDURE — 94640 AIRWAY INHALATION TREATMENT: CPT

## 2023-02-08 PROCEDURE — 36415 COLL VENOUS BLD VENIPUNCTURE: CPT

## 2023-02-08 PROCEDURE — 99239 HOSP IP/OBS DSCHRG MGMT >30: CPT | Performed by: FAMILY MEDICINE

## 2023-02-08 PROCEDURE — 94660 CPAP INITIATION&MGMT: CPT

## 2023-02-08 RX ORDER — HYDRALAZINE HYDROCHLORIDE 10 MG/1
10 TABLET, FILM COATED ORAL 2 TIMES DAILY
Qty: 90 TABLET | Refills: 3 | Status: SHIPPED | OUTPATIENT
Start: 2023-02-08

## 2023-02-08 RX ORDER — FLUTICASONE FUROATE AND VILANTEROL TRIFENATATE 100; 25 UG/1; UG/1
POWDER RESPIRATORY (INHALATION)
Qty: 60 EACH | Refills: 0 | Status: SHIPPED | OUTPATIENT
Start: 2023-02-08

## 2023-02-08 RX ORDER — METOPROLOL SUCCINATE 50 MG/1
150 TABLET, EXTENDED RELEASE ORAL DAILY
Qty: 30 TABLET | Refills: 3 | Status: SHIPPED | OUTPATIENT
Start: 2023-02-09

## 2023-02-08 RX ADMIN — ENOXAPARIN SODIUM 40 MG: 100 INJECTION SUBCUTANEOUS at 09:04

## 2023-02-08 RX ADMIN — OXYCODONE AND ACETAMINOPHEN 1 TABLET: 5; 325 TABLET ORAL at 09:09

## 2023-02-08 RX ADMIN — ARFORMOTEROL TARTRATE 15 MCG: 15 SOLUTION RESPIRATORY (INHALATION) at 08:50

## 2023-02-08 RX ADMIN — FAMOTIDINE 20 MG: 20 TABLET, FILM COATED ORAL at 09:03

## 2023-02-08 RX ADMIN — DESMOPRESSIN ACETATE 200 MCG: 0.1 TABLET ORAL at 09:03

## 2023-02-08 RX ADMIN — METOPROLOL SUCCINATE 150 MG: 100 TABLET, EXTENDED RELEASE ORAL at 09:03

## 2023-02-08 RX ADMIN — DOCUSATE SODIUM 100 MG: 100 CAPSULE, LIQUID FILLED ORAL at 09:03

## 2023-02-08 RX ADMIN — DILTIAZEM HYDROCHLORIDE 30 MG: 30 TABLET, FILM COATED ORAL at 14:27

## 2023-02-08 RX ADMIN — OMEGA-3-ACID ETHYL ESTERS 1 G: 1 CAPSULE, LIQUID FILLED ORAL at 09:03

## 2023-02-08 RX ADMIN — FERROUS SULFATE TAB 325 MG (65 MG ELEMENTAL FE) 325 MG: 325 (65 FE) TAB at 09:03

## 2023-02-08 RX ADMIN — BUDESONIDE 500 MCG: 0.5 SUSPENSION RESPIRATORY (INHALATION) at 08:50

## 2023-02-08 RX ADMIN — FUROSEMIDE 40 MG: 40 TABLET ORAL at 09:03

## 2023-02-08 RX ADMIN — HYDRALAZINE HYDROCHLORIDE 10 MG: 10 TABLET, FILM COATED ORAL at 09:03

## 2023-02-08 RX ADMIN — ESCITALOPRAM 10 MG: 10 TABLET, FILM COATED ORAL at 09:03

## 2023-02-08 RX ADMIN — POTASSIUM CHLORIDE 20 MEQ: 1500 TABLET, EXTENDED RELEASE ORAL at 09:03

## 2023-02-08 RX ADMIN — PREDNISONE 40 MG: 20 TABLET ORAL at 12:12

## 2023-02-08 RX ADMIN — PETROLATUM: 420 OINTMENT TOPICAL at 09:07

## 2023-02-08 RX ADMIN — LEVOTHYROXINE SODIUM 75 MCG: 0.03 TABLET ORAL at 09:03

## 2023-02-08 RX ADMIN — DILTIAZEM HYDROCHLORIDE 30 MG: 30 TABLET, FILM COATED ORAL at 09:03

## 2023-02-08 ASSESSMENT — PAIN SCALES - GENERAL
PAINLEVEL_OUTOF10: 0
PAINLEVEL_OUTOF10: 7

## 2023-02-08 NOTE — PATIENT CARE CONFERENCE
[facility] Quality Flow/Interdisciplinary Rounds Progress Note        Quality Flow Rounds held on February 8, 2023    Disciplines Attending:  Bedside Nurse, , , and Nursing Unit Leadership    Osiris Monahan was admitted on 2/4/2023  2:51 PM    Anticipated Discharge Date:       Disposition:    Blas Score:  Blas Scale Score: 19    Readmission Risk              Risk of Unplanned Readmission:  42           Discussed patient goal for the day, patient clinical progression, and barriers to discharge.   The following Goal(s) of the Day/Commitment(s) have been identified:  Discharge - Obtain Order and Labs - Report Results      Larissa Major RN  February 8, 2023

## 2023-02-08 NOTE — CARE COORDINATION
Keena at [facility] notified of discharge order on chart. FM resident messaged to order hhc for PT & OT also. 1205  Met with pt in room along with pt's nurse. Pt said her  will provide transportation home. Pt's  to bring portable o2 tank from home. Order on chart for additional portable o2 tanks. Pt said she does not need any additional tanks but would like to have smaller tanks (inogen) if possible. Linden Canseco at [facility] notified and they will reach out to pt.

## 2023-02-08 NOTE — TELEPHONE ENCOUNTER
Last Appointment:  12/16/2022  Future Appointments  2/13/2023  3:00 PM    MD Cameron Summers [facility]  2/21/2023  12:00 PM   [facility] CHF ROOM 1            ANURAG [facility]            Carole Reyna

## 2023-02-08 NOTE — PROGRESS NOTES
Patient ready for DC. IV and heart monitor removed. AVS completed and reviewed with patient. All scripts given to patient. Patient  to transport patient home.   Patient had no questions or concerns at time of DC

## 2023-02-08 NOTE — PROGRESS NOTES
CLINICAL PHARMACY NOTE: MEDS TO BEDS    Total # of Prescriptions Filled: 1   The following medications were delivered to the patient:  Metoprolol succ er 50mg    Additional Documentation:  Delivered to patient 2-8-23 @12:58pm

## 2023-02-08 NOTE — PROGRESS NOTES
[facility]   Family Medicine Residency Program  Resident In-Patient Progress Note    S:  Hospital day: 4   Brief Synopsis: Dorinda Warner is a 78 y.o. female with PMH of COPD chronically on 4 to 5 L of O2, sarcoidosis, CHF, pulmonary hypertension, A. fib, chronic pain with chronic opioid use who presented to the ER for worsening shortness of breath and concern for COPD/CHF exacerbation starting a few days ago. Patient requiring between 8 to 10 L of oxygen in the ER, as well as BiPAP to help with symptoms. Pertinent labs in the ER included a proBNP 7730, troponin 17, 18 delta negative. Procalcitonin obtained overall to rule out pneumonia or any bacterial infection; 0.06. Chest x-ray revealing mild cardiomegaly with hazy bilateral airspace disease concerning for pneumonia versus CHF. CTA pulm with no acute emboli noted, bilateral chronic pleural effusions and parenchymal passes and areas of scarring. Patient was given 1 dose of Lasix IV 40 mg as well as DuoNeb's x3 ampoules and admitted for CHF/COPD exacerbation. Started on 40 mg prednisone for the next 5 days, if not improving can consider a BX though will hold off at this time. Ordered sputum culture and Gram stain, continued on Pulmicort Brovana and Pep flutter. Continue with Lasix 40 mg daily at this time, monitor input and output and daily weight. Patient seen and examined this AM.  She had no acute concerns overnight. Doing well with BiPAP overnight. Back to her home baseline of 6 L of oxygen.   Discharging today    Cont meds:   Scheduled meds:    metoprolol succinate  150 mg Oral Daily    white petrolatum   Topical BID    enoxaparin  40 mg SubCUTAneous Daily    desmopressin  200 mcg Oral BID    dilTIAZem  30 mg Oral TID    docusate sodium  100 mg Oral BID    escitalopram  10 mg Oral Daily    famotidine  20 mg Oral Daily    ferrous sulfate  325 mg Oral BID    levothyroxine  75 mcg Oral Daily    omega-3 acid ethyl esters  1 g Oral BID    potassium chloride  20 mEq Oral Once per day on Mon Wed Fri    furosemide  40 mg Oral Daily    hydrALAZINE  10 mg Oral BID    budesonide  0.5 mg Nebulization BID    arformoterol tartrate  15 mcg Nebulization BID    predniSONE  40 mg Oral Lunch     PRN meds: hydrALAZINE, labetalol, guaiFENesin-dextromethorphan, sodium chloride, oxyCODONE-acetaminophen     I reviewed the patient's past medical and surgical history, Medications and Allergies. O:  BP (!) 154/85   Pulse 70   Temp 97.6 °F (36.4 °C) (Temporal)   Resp 20   Ht 5' (1.524 m)   Wt 157 lb 11.2 oz (71.5 kg)   LMP  (LMP Unknown)   SpO2 99%   BMI 30.80 kg/m²   24 hour I&O: I/O last 3 completed shifts: In: 80 [P.O.:810]  Out: 2250 [Urine:2250]  I/O this shift:  In: 180 [P.O.:180]  Out: 400 [Urine:400]      Physical Exam  Constitutional:       Appearance: She is not ill-appearing. Cardiovascular:      Pulses: Normal pulses. Heart sounds: Murmur heard. Comments: Irregularly irregular rhythm consistent with atrial fibrillation  Pulmonary:      Breath sounds: Wheezing present. Comments: On BiPAP, with lower lobe crackles and some expiratory wheezing noted  Chest:      Chest wall: Tenderness present. Abdominal:      General: Bowel sounds are normal. There is no distension. Palpations: Abdomen is soft. Tenderness: There is no abdominal tenderness. Comments: Chronic abdominal wound, dressed appropriately, no drainage noted   Musculoskeletal:         General: Normal range of motion. Right lower leg: No edema. Left lower leg: No edema. Skin:     General: Skin is warm. Coloration: Skin is not jaundiced. Findings: No bruising. Neurological:      Mental Status: She is alert and oriented to person, place, and time. Psychiatric:         Mood and Affect: Mood normal.         Behavior: Behavior normal.         Thought Content:  Thought content normal.         Judgment: Judgment normal. Labs:  Na/K/Cl/CO2:  139/3.5/97/34 (02/08 5146)  BUN/Cr/glu/ALT/AST/amyl/lip:  17/0.8/--/--/--/--/-- (02/08 9930)  WBC/Hgb/Hct/Plts:  4.4/10.6/33.7/127 (02/08 3090)  estimated creatinine clearance is 61 mL/min (based on SCr of 0.8 mg/dL). Other pertinent labs as noted below    Radiology:  XR CHEST (2 VW)   Final Result   No significant change. Findings raise the possibility of possible pulmonary   hypertension         CTA PULMONARY W CONTRAST   Final Result   1. Signs of right-sided failure causing lack of proper opacification of the   pulmonary artery circulation including opacification of the more distal   subsegmental branches particular for the lower lobes with lack contrast   opacification of the pulmonary venous return. 2.  No sizable central pulmonary embolus seen but there are flow phenomena   causing solid filling defects in the central pulmonary arteries. See above   comments and recommendations. 3.  Chronic bilateral pleural effusions. 4.  Chronic bi basilar parenchymal passes and areas of scar in the lung   parenchyma. Cannot exclude component of chronic pneumonia or aspiration in   the left lower lobe. RECOMMENDATIONS:   Unavailable         XR CHEST PORTABLE   Final Result   1. Mild cardiomegaly with findings of hazy bilateral airspace disease which   could represent CHF or possibly pneumonia in the proper clinical setting. A/P:  Principal Problem (Resolved):    Acute respiratory failure with hypoxia (HCC)  Active Problems:    * No active hospital problems.  *    Concern for CHF exacerbation  Hx HFpEF  Last echo with ejection fraction greater than 60%  proBNP elevated greater than 7000 this presentation, patient's baseline anywhere between 7089-3387  Patient has a history of noncompliance/nonadherence with medications  Supposed to be taking Lasix 40 mg daily at home  Patient does follow nephrology outpatient  Continue with Lasix 40 mg daily  Strict input output  Daily weights  CHF nurse consult  Monitor kidney function     Concern for COPD exacerbation  Patient has not smoked in the past 23 years  She has history of sarcoidosis, which may be worsening? Recommend close outpatient follow-up for this. Prednisone 40 mg x5 days just for inpatient. Pro-Paul 0.06, monitor off of antibiotics for now  Follow-up sputum culture and Gram stain  Continue Pulmicort, Brovana and Pep flutter  Continue BiPAP, monitor O2 requirements, wean down to baseline as appropriate     Hx A.  Fib  Patient does not tolerate Eliquis well  Continue with Lovenox for now  Lopressor 150 mg daily for rate control, continue Cardizem 30 mg 3 times daily     History of sarcoidosis  Patient has not had any recent follow-up with pulm  Last visit 8/11/2022; at that time patient was recommended to get PFTs ; revealing both restrictive and obstructive disease  Patient completed sleep study as appropriate     Hx diabetes insipidus  Continue home dose desmopressin     Hx hypothyroidism  Continue home dose levothyroxine 75 mcg daily    Hx GERD  Continue home dose Pepcid     Hx depression  Continue home dose Lexapro 10 mg daily     History of chronic pain with chronic opioid use  Continue to wean down opioids  Percocet every 6 to 8 hours as needed    GI/DVT ppx: Lovenox and Protonix  Diet: Diabetic diet    Electronically signed by Henry Mobley MD  PGY-3 on 2/8/2023 at 11:34 AM  This case was discussed with attending physician: Dr. Ida Shepard

## 2023-02-08 NOTE — PROGRESS NOTES
[address] [facility]  Family Medicine Attending    S: 78 y.o. female with COPD chronically on 4-5 L of O2, sarcoidosis, CHF, and pulmonary HTN, sarcoidosis as well as afib, chronic pain with opioid use who was admitted for COPD/CHF exacerbation. She was placed on BiPAP in the ED to help her low oxygenation. Since admission, Sats have been improving and she is back to baseline oxygen    Patient seen and examined. She has chest wall pain that has been present since before admission. Her shortness of breath is improving. She is anxious to go home. She reports that she has multiple assistive devices that help her to get around at home and her  is there to help her if she needs. O: VS- Blood pressure (!) 157/98, pulse 67, temperature 98.2 °F (36.8 °C), temperature source Temporal, resp. rate 18, height 5' (1.524 m), weight 157 lb 11.2 oz (71.5 kg), SpO2 97 %, not currently breastfeeding. Exam is as noted by resident with the following changes, additions or corrections:  Gen:  awake and oriented x 3, pleasant  CVS-irreg irreg  Lungs-bilateral wheeze and crackles at the bases improving  Ext-there is bony abnormality on the left medial malleolus; no edema bilaterally      Impressions:  Acute respiratory failure with hypoxia (HCC)  Chronic afib  Chf exacerbation  HFpEF  Copd exacerbation  Sarcoidosis  Diabetes insipidus  Hypothyroidism  Generalized weakness-[facility] 14/24-patient refusing placement; will order PT at discharge as she is already enrolled in home health for skilled nursing  Hypertension    Plan:     Lasix 40 mg daily  Daily weights (I&O)  -2280mL since admission  Repeat CXR no significant change    Wean bipap as tolerated. Steroids-prednisone 40 mg daily x 5 days  Ddavp  Continue home dose of levothyroxine  Continue cardizem for afib.   Change lopressor to toprol xl    Dispo:  likely discharge tomorrow with home PT     Attending Physician Statement  I have reviewed the chart and seen the patient with the resident(s). I personally reviewed images, EKG's and similar tests, if present. I personally reviewed and performed key elements of the history and exam.  I have reviewed and confirmed student and/or resident history and exam with changes as indicated above. I agree with the assessment, plan and orders as documented by the resident. Please refer to the resident progress note today  for additional information. Kenneth Arciniega.  Lio Huang MD

## 2023-02-08 NOTE — DISCHARGE SUMMARY
Discharge Summary    Nora Barrera  :  1955  MRN:  [id number]    Admit date:  2023  Discharge date:      Admitting Physician:  Gerardine Gaucher, MD    Discharge Diagnoses:    Patient Active Problem List   Diagnosis    Chronic back pain    Primary hypothyroidism    Nephrosclerosis    Diabetes insipidus, neurohypophyseal ([facility] [address]    Sarcoidosis    Steroid-induced avascular necrosis of shoulder ([facility] [address]    Anemia due to chronic illness    Chronic pain syndrome    Bilateral hip pain    Debility    BRBPR (bright red blood per rectum)    Hypertensive pulmonary vascular disease    Benign essential hypertension    Chronic kidney disease, stage III (moderate) (HCC)    PAF (paroxysmal atrial fibrillation) ([facility] [address] currently in NSR    Mixed hyperlipidemia    Chronic combined systolic and diastolic heart failure (HCC)    Chronic respiratory failure with hypoxia (HCC)    Mixed simple and mucopurulent chronic bronchitis (HCC)    Recurrent major depressive disorder, in partial remission (HCC)    Gait disturbance    Chronic, continuous use of opioids    PMB (postmenopausal bleeding)    Severe dysplasia of vagina    Epistaxis    Ventral hernia without obstruction or gangrene    Long term (current) use of systemic steroids    Confusion    Acute combined systolic and diastolic CHF, NYHA class 1 (HCC)    Bilateral pleural effusion    Nuclear senile cataract       Admission Condition:  {condition:42436}    Discharged Condition:  {condition:45620}    Hospital Course:  Nora Barrera is a 78 y.o. female with PMH of *** presented to *** with ***. Workup has revealed ***. Patient was admitted for ***, treated with ***. *** were consulted and recommended *** . *** Were following up. Patient remained stable, recovered and was in a suitable condition to be discharged ***.       Discharge plan:   ***      Discharge Medications:         Medication List        START taking these medications      hydrALAZINE 10 MG tablet  Commonly known as: APRESOLINE  Take 1 tablet by mouth in the morning and at bedtime            CHANGE how you take these medications      metoprolol succinate 50 MG extended release tablet  Commonly known as: TOPROL XL  Take 3 tablets by mouth daily  Start taking on: February 9, 2023  What changed:   how much to take  additional instructions            CONTINUE taking these medications      * albuterol (2.5 MG/3ML) 0.083% nebulizer solution  Commonly known as: PROVENTIL  Take 3 mLs by nebulization every 6 hours as needed for Wheezing or Shortness of Breath     * albuterol sulfate  (90 Base) MCG/ACT inhaler  Commonly known as: Proventil HFA  Inhale 2 puffs into the lungs every 6 hours as needed for Wheezing or Shortness of Breath     apixaban 5 MG Tabs tablet  Commonly known as: ELIQUIS     Breo Ellipta 100-25 MCG/ACT Aepb  Generic drug: Fluticasone Furoate-Vilanterol  Inhale 1 puff into the lungs daily     cycloSPORINE 0.05 % ophthalmic emulsion  Commonly known as: Restasis  Place 1 drop into both eyes every 12 hours     desmopressin 0.1 MG tablet  Commonly known as: DDAVP  Take 2 tablets by mouth 2 times daily     dilTIAZem 30 MG tablet  Commonly known as: CARDIZEM  Take 1 tablet by mouth in the morning and 1 tablet at noon and 1 tablet before bedtime.      docusate sodium 100 MG capsule  Commonly known as: Colace  Take 1 capsule by mouth 2 times daily     escitalopram 10 MG tablet  Commonly known as: LEXAPRO  Take 1 tablet by mouth daily     famotidine 20 MG tablet  Commonly known as: PEPCID  Take 1 tablet by mouth daily     ferrous sulfate 325 (65 Fe) MG tablet  Commonly known as: IRON 325  Take 1 tablet by mouth 2 times daily     furosemide 40 MG tablet  Commonly known as: LASIX  Take 1 tablet by mouth daily     levothyroxine 75 MCG tablet  Commonly known as: SYNTHROID  Take 1 tablet by mouth daily     naloxone 4 MG/0.1ML Liqd nasal spray  Commonly known as: Narcan  1 spray by Nasal route as needed for Opioid Reversal omega-3 acid ethyl esters 1 g capsule  Commonly known as: LOVAZA  Take 1 capsule by mouth in the morning and 1 capsule before bedtime. oxyCODONE-acetaminophen 5-325 MG per tablet  Commonly known as: PERCOCET  Take 1 tablet by mouth every 4 hours as needed for Pain for up to 30 days. OXYGEN  Inhale 6 L/min into the lungs as needed (shortness of breath, low oxygen <90%) BMS     potassium chloride 20 MEQ Tbcr extended release tablet  Commonly known as: KLOR-CON M     predniSONE 5 MG tablet  Commonly known as: DELTASONE  Take 1 tablet by mouth daily     promethazine-codeine 6.25-10 MG/5ML syrup  Commonly known as: PHENERGAN with CODEINE  Take 5 mLs by mouth 4 times daily as needed for Cough for up to 60 days. sodium chloride 0.65 % nasal spray  Commonly known as: OCEAN           * This list has 2 medication(s) that are the same as other medications prescribed for you. Read the directions carefully, and ask your doctor or other care provider to review them with you. STOP taking these medications      metoprolol 100 MG tablet  Commonly known as: LOPRESSOR               Where to Get Your Medications        These medications were sent to [facility], [address] [address]      Phone: [phone]   hydrALAZINE 10 MG tablet  metoprolol succinate 50 MG extended release tablet       You can get these medications from any pharmacy    Bring a paper prescription for each of these medications  OXYGEN         Consults:  {consultation:36565}    Significant Diagnostic Studies:  {diagnostics:32127}    Labs:  Na/K/Cl/CO2:  139/3.5/97/34 (02/08 0721)  BUN/Cr/glu/ALT/AST/amyl/lip:  17/0.8/--/--/--/--/-- (02/08 3891)  WBC/Hgb/Hct/Plts:  4.4/10.6/33.7/127 (02/08 9934)  [unfilled]  estimated creatinine clearance is 61 mL/min (based on SCr of 0.8 mg/dL).     New Imaging:  XR CHEST (2 VW)   Final Result   No significant change. Findings raise the possibility of possible pulmonary   hypertension         CTA PULMONARY W CONTRAST   Final Result   1. Signs of right-sided failure causing lack of proper opacification of the   pulmonary artery circulation including opacification of the more distal   subsegmental branches particular for the lower lobes with lack contrast   opacification of the pulmonary venous return. 2.  No sizable central pulmonary embolus seen but there are flow phenomena   causing solid filling defects in the central pulmonary arteries. See above   comments and recommendations. 3.  Chronic bilateral pleural effusions. 4.  Chronic bi basilar parenchymal passes and areas of scar in the lung   parenchyma. Cannot exclude component of chronic pneumonia or aspiration in   the left lower lobe. RECOMMENDATIONS:   Unavailable         XR CHEST PORTABLE   Final Result   1. Mild cardiomegaly with findings of hazy bilateral airspace disease which   could represent CHF or possibly pneumonia in the proper clinical setting. Treatments:   {Tx:41977}    Discharge Exam:  {GENERAL PHYSICAL EXAM:}  {male exam, choose systems:28046}    {female exam, choose systems:67479}    {PHYSICAL EAG}    Disposition:   {disposition:11540}    Future Appointments   Date Time Provider Mauri Sanford   2023  3:00 PM MD Rachel Bernard [facility]   2023 12:00 PM [facility] ROOM 1 [facility]       More than 30 minutes was spent in preparation of this patient's discharge including, but not limited to, examination, preparation of documents, prescription preparation, counseling and coordination. Signed:   Tereza Alanis MD  2023, 11:35 AM

## 2023-02-09 ENCOUNTER — TELEPHONE (OUTPATIENT)
Dept: FAMILY MEDICINE CLINIC | Age: 68
End: 2023-02-09

## 2023-02-09 NOTE — TELEPHONE ENCOUNTER
Care Transitions Initial Follow Up Call    Outreach made within 2 business days of discharge: Yes    Patient: Rebecca Chacon Patient : Acute respiratory failure with hypoxia   MRN: [id number]  Reason for Admission: Acute respiratory failure with hypoxia  Discharge Date: 23    Message left on patient's voice mail requesting return call.         Scheduled appointment with PCP within 7-14 days    Follow Up  Future Appointments   Date Time Provider Mauri Sanford   2023  3:00 PM MD Jailene George [facility]   2023 12:00 PM [facility] ROOM 1 [facility] Stephanie Woody RN

## 2023-02-10 ENCOUNTER — TELEPHONE (OUTPATIENT)
Dept: FAMILY MEDICINE CLINIC | Age: 68
End: 2023-02-10

## 2023-02-10 NOTE — TELEPHONE ENCOUNTER
Care Transitions Initial Follow Up Call    Outreach made within 2 business days of discharge: Yes    Patient: Rebecca Chacon Patient : 1955   MRN: [id number]  Reason for Admission: Acute respiratory failure with hypoxia  Discharge Date: 23       Spoke with: Patient :Eliseo Desir    Discharge department/facility: [facility]    TCM Interactive Patient Contact:  Was patient able to fill all prescriptions: No: everything but her cough medication   Was patient instructed to bring all medications to the follow-up visit: Yes  Is patient taking all medications as directed in the discharge summary?  Yes  Does patient understand their discharge instructions: Yes  Does patient have questions or concerns that need addressed prior to 7-14 day follow up office visit: no    Scheduled appointment with PCP within 7-14 days    Follow Up  Future Appointments   Date Time Provider Mauri Sanford   2023  3:00 PM MD Jailene George [facility]   2023 12:00 PM [facility]F ROOM 1 [facility] Stephanie Woody RN

## 2023-02-13 ENCOUNTER — OFFICE VISIT (OUTPATIENT)
Dept: FAMILY MEDICINE CLINIC | Age: 68
End: 2023-02-13
Payer: MEDICARE

## 2023-02-13 VITALS
HEART RATE: 62 BPM | SYSTOLIC BLOOD PRESSURE: 116 MMHG | TEMPERATURE: 97.4 F | RESPIRATION RATE: 18 BRPM | OXYGEN SATURATION: 98 % | DIASTOLIC BLOOD PRESSURE: 70 MMHG

## 2023-02-13 DIAGNOSIS — R32 URINARY INCONTINENCE, UNSPECIFIED TYPE: ICD-10-CM

## 2023-02-13 DIAGNOSIS — J44.9 CHRONIC OBSTRUCTIVE PULMONARY DISEASE, UNSPECIFIED COPD TYPE (HCC): ICD-10-CM

## 2023-02-13 DIAGNOSIS — Z09 HOSPITAL DISCHARGE FOLLOW-UP: Primary | ICD-10-CM

## 2023-02-13 DIAGNOSIS — R05.3 CHRONIC COUGH: ICD-10-CM

## 2023-02-13 PROCEDURE — 99212 OFFICE O/P EST SF 10 MIN: CPT | Performed by: FAMILY MEDICINE

## 2023-02-13 RX ORDER — DESMOPRESSIN ACETATE 0.1 MG/1
0.2 TABLET ORAL 2 TIMES DAILY
Qty: 120 TABLET | Refills: 2 | Status: SHIPPED | OUTPATIENT
Start: 2023-02-13 | End: 2023-05-14

## 2023-02-13 RX ORDER — PROMETHAZINE HYDROCHLORIDE AND CODEINE PHOSPHATE 6.25; 1 MG/5ML; MG/5ML
5 SYRUP ORAL 4 TIMES DAILY PRN
Qty: 473 ML | Refills: 0 | Status: SHIPPED | OUTPATIENT
Start: 2023-02-13 | End: 2023-04-14

## 2023-02-13 SDOH — ECONOMIC STABILITY: FOOD INSECURITY: WITHIN THE PAST 12 MONTHS, YOU WORRIED THAT YOUR FOOD WOULD RUN OUT BEFORE YOU GOT MONEY TO BUY MORE.: NEVER TRUE

## 2023-02-13 SDOH — ECONOMIC STABILITY: FOOD INSECURITY: WITHIN THE PAST 12 MONTHS, THE FOOD YOU BOUGHT JUST DIDN'T LAST AND YOU DIDN'T HAVE MONEY TO GET MORE.: NEVER TRUE

## 2023-02-13 SDOH — ECONOMIC STABILITY: INCOME INSECURITY: HOW HARD IS IT FOR YOU TO PAY FOR THE VERY BASICS LIKE FOOD, HOUSING, MEDICAL CARE, AND HEATING?: SOMEWHAT HARD

## 2023-02-13 SDOH — ECONOMIC STABILITY: HOUSING INSECURITY
IN THE LAST 12 MONTHS, WAS THERE A TIME WHEN YOU DID NOT HAVE A STEADY PLACE TO SLEEP OR SLEPT IN A SHELTER (INCLUDING NOW)?: NO

## 2023-02-13 ASSESSMENT — LIFESTYLE VARIABLES
HOW OFTEN DO YOU HAVE A DRINK CONTAINING ALCOHOL: NEVER
HOW MANY STANDARD DRINKS CONTAINING ALCOHOL DO YOU HAVE ON A TYPICAL DAY: PATIENT DOES NOT DRINK

## 2023-02-13 NOTE — PROGRESS NOTES
[facility]  Department of [facility]  Family Medicine Residency Program    Date of Visit: 2023     Chief Complaint     Chief Complaint   Patient presents with    Follow-Up from Hospital        History of Present Illness     HPI:  78 y.o. female with pmh of COPD, sarcoidosis, CHF, pulmonary HTN, A-fib , Chronic Pain Syndrome ( with chronic use of percocet) presents for hospital follow up. Charts reviewed     Patient presented to the ER with SOB that began a few days prior to admission. At time of admission , patient was requiring 8-10 L O2 , which is not normal for the patient. She normally requires 4-5 L O2 at baseline, but with increased requirements for CPAP at home. Pertinent labs in the ER : proBNP 7730, troponins 17,18. CTA chest negative for Pes , though there was presence of chronic pleural effusions. ProCal 0.06 hence no abx were initiated. Patient was started on steroids and given lasix. She improved while in the hospital. Her chronic pain meds were also restarted at the appropriate dosing. There were no concerns were worsening respiratory failure or depression during the patient's stay. With patient's improvement , she was discharged to home. Today, patient states she has not had any worsening SOB. Continued back on 5 mg daily prednisone in reference to patient's hx of sarcoidosis. She has been using lasix as needed. She denies any worsening wheezing at this time    Is complaining that she has not received the right amount of pain medications. On review of patients percocet script ; she was prescribed 135 tabs , though only received 120. No other concerns today.      Social History     Tobacco Use    Smoking status: Former     Packs/day: 0.50     Years: 25.00     Pack years: 12.50     Types: Cigarettes     Start date:      Quit date: 9/10/1996     Years since quittin.4    Smokeless tobacco: Never    Tobacco comments:     Patient quit smoking  yrs.ago. Vaping Use    Vaping Use: Never used    Passive vaping exposure: Yes   Substance Use Topics    Alcohol use: Never     Alcohol/week: 0.0 standard drinks    Drug use: Not Currently     Comment: 1996 tory       ROS:    Reviewed, pertinent as above otherwise negative    Objective:    VS:  Blood pressure 116/70, pulse 62, temperature 97.4 °F (36.3 °C), temperature source Temporal, resp. rate 18, SpO2 98 %, not currently breastfeeding. Physical Exam  Constitutional:       Appearance: She is not ill-appearing. Comments: Wheelchair bound   Cardiovascular:      Rate and Rhythm: Rhythm irregular. Pulses: Normal pulses. Heart sounds: No murmur heard. Pulmonary:      Effort: Pulmonary effort is normal. No respiratory distress. Breath sounds: Normal breath sounds. No wheezing. Comments: On 5 L NC O2  Abdominal:      General: Bowel sounds are normal.      Palpations: Abdomen is soft. Comments: Abdominal hernia noted ( chronic). No added seepage or drainage. Neurological:      Mental Status: She is alert and oriented to person, place, and time. Assessment/Plan:    1. Hospital discharge follow-up  Medications reconciled as appropriate    2. Chronic obstructive pulmonary disease, unspecified COPD type ([facility] [address]  Patient stable on 5 L O2 right now  Continue to monitor  Advise follow up with pulmonology as appropriate    3. Chronic cough  Refill phenergan  Patient states she was taking this the entire time she was at the hospital  Advised that right now, we will work on decreasing percocet and then will decrease phenergan. - promethazine-codeine (PHENERGAN WITH CODEINE) 6.25-10 MG/5ML syrup; Take 5 mLs by mouth 4 times daily as needed for Cough for up to 60 days. Dispense: 473 mL; Refill: 0    4. Urinary incontinence, unspecified type  Refill DDAVP  - desmopressin (DDAVP) 0.1 MG tablet; Take 2 tablets by mouth 2 times daily  Dispense: 120 tablet;  Refill: 2    RTO 1 month or sooner prn for any persistent, new, or worsening symptoms. Please see Patient Instructions for further counseling and information given. Advised to please be adherent to the treatment plans discussed today, and please call with any questions or concerns, letting the office know of any reasons that the plans may not be followed. The risks of untreated conditions include worsening illness, injury, disability, and possibly, death. Please call if symptoms change in any way, worsen, or fail to completely resolve, as this could necessitate a change to treatment plans. Patient and/or caregiver expressed understanding. Indications and proper use of medication(s) reviewed. Potential side-effects and risks of medication(s) also explained. Patient and/or caregiver was instructed to call if any new symptoms develop prior to next visit. Health risk factors discussed and addressed.      Electronically signed by Mariusz Cornejo MD PGY-3 on 2/13/2023 at 5:56 PM  This case was discussed with attending physician: Dr. Johanna Mullen

## 2023-02-13 NOTE — PROGRESS NOTES
S: 78 y.o. female here for hospital f/u for acute hypoxic respiratory failure. H/o sarcoidosis and restrictive lung dz. Also has HFrEF. Given prednisone taper and bipap. Now on NC 6 L, needing more bipap at home. Feeling better. O: VS:  /70   Pulse 62   Temp 97.4 °F (36.3 °C) (Temporal)   Resp 18   LMP  (LMP Unknown)   SpO2 98% Comment: 6L O2 via nasal cannula   General: NAD, alert and interacting appropriately. CV:  RRR, no gallops, rubs, or murmurs    Resp: CTAB   Abd:  Soft, nontender   Ext:  No edema    Impression: hospital f/u for acute hypoxic respiratory failure. Plan:   CPM sarcoidosis   F/u pulm  F/u cards  Continue taper of percocet. Attending Physician Statement  I have discussed the case, including pertinent history and exam findings with the resident. I agree with the documented assessment and plan.

## 2023-02-21 ENCOUNTER — HOSPITAL ENCOUNTER (OUTPATIENT)
Dept: OTHER | Age: 68
Discharge: HOME | End: 2023-02-21

## 2023-03-04 ENCOUNTER — APPOINTMENT (OUTPATIENT)
Dept: CT IMAGING | Age: 68
DRG: 394 | End: 2023-03-04
Payer: COMMERCIAL

## 2023-03-04 ENCOUNTER — TELEPHONE (OUTPATIENT)
Dept: OTHER | Facility: CLINIC | Age: 68
End: 2023-03-04

## 2023-03-04 ENCOUNTER — HOSPITAL ENCOUNTER (INPATIENT)
Age: 68
LOS: 2 days | Discharge: HOME | DRG: 394 | End: 2023-03-07
Attending: STUDENT IN AN ORGANIZED HEALTH CARE EDUCATION/TRAINING PROGRAM | Admitting: FAMILY MEDICINE
Payer: COMMERCIAL

## 2023-03-04 ENCOUNTER — APPOINTMENT (OUTPATIENT)
Dept: GENERAL RADIOLOGY | Age: 68
DRG: 394 | End: 2023-03-04
Payer: COMMERCIAL

## 2023-03-04 DIAGNOSIS — R53.83 FATIGUE, UNSPECIFIED TYPE: ICD-10-CM

## 2023-03-04 DIAGNOSIS — R10.9 ABDOMINAL PAIN, UNSPECIFIED ABDOMINAL LOCATION: ICD-10-CM

## 2023-03-04 DIAGNOSIS — K43.9 VENTRAL HERNIA WITHOUT OBSTRUCTION OR GANGRENE: ICD-10-CM

## 2023-03-04 DIAGNOSIS — I50.9 ACUTE ON CHRONIC CONGESTIVE HEART FAILURE, UNSPECIFIED HEART FAILURE TYPE (HCC): Primary | ICD-10-CM

## 2023-03-04 LAB
ALBUMIN SERPL-MCNC: 4 G/DL (ref 3.5–5.2)
ALP BLD-CCNC: 86 U/L (ref 35–104)
ALT SERPL-CCNC: 19 U/L (ref 0–32)
ANION GAP SERPL CALCULATED.3IONS-SCNC: 6 MMOL/L (ref 7–16)
AST SERPL-CCNC: 20 U/L (ref 0–31)
B.E.: 10.7 MMOL/L (ref -3–3)
BASOPHILS ABSOLUTE: 0.03 E9/L (ref 0–0.2)
BASOPHILS RELATIVE PERCENT: 0.5 % (ref 0–2)
BILIRUB SERPL-MCNC: 0.4 MG/DL (ref 0–1.2)
BUN BLDV-MCNC: 25 MG/DL (ref 6–23)
CALCIUM SERPL-MCNC: 9.2 MG/DL (ref 8.6–10.2)
CHLORIDE BLD-SCNC: 93 MMOL/L (ref 98–107)
CO2: 40 MMOL/L (ref 22–29)
COHB: 0.9 % (ref 0–1.5)
CREAT SERPL-MCNC: 1.2 MG/DL (ref 0.5–1)
CRITICAL: ABNORMAL
DATE ANALYZED: ABNORMAL
DATE OF COLLECTION: ABNORMAL
EOSINOPHILS ABSOLUTE: 0.14 E9/L (ref 0.05–0.5)
EOSINOPHILS RELATIVE PERCENT: 2.2 % (ref 0–6)
GFR SERPL CREATININE-BSD FRML MDRD: 50 ML/MIN/1.73
GLUCOSE BLD-MCNC: 74 MG/DL (ref 74–99)
HCO3: 37.1 MMOL/L (ref 22–26)
HCT VFR BLD CALC: 41 % (ref 34–48)
HEMOGLOBIN: 12.3 G/DL (ref 11.5–15.5)
HHB: 3.3 % (ref 0–5)
IMMATURE GRANULOCYTES #: 0.03 E9/L
IMMATURE GRANULOCYTES %: 0.5 % (ref 0–5)
LAB: ABNORMAL
LACTIC ACID, SEPSIS: 1.2 MMOL/L (ref 0.5–1.9)
LIPASE: 21 U/L (ref 13–60)
LYMPHOCYTES ABSOLUTE: 0.82 E9/L (ref 1.5–4)
LYMPHOCYTES RELATIVE PERCENT: 12.6 % (ref 20–42)
Lab: ABNORMAL
MAGNESIUM: 1.9 MG/DL (ref 1.6–2.6)
MCH RBC QN AUTO: 25.5 PG (ref 26–35)
MCHC RBC AUTO-ENTMCNC: 30 % (ref 32–34.5)
MCV RBC AUTO: 84.9 FL (ref 80–99.9)
METER GLUCOSE: 76 MG/DL (ref 74–99)
METHB: 0.3 % (ref 0–1.5)
MODE: ABNORMAL
MONOCYTES ABSOLUTE: 0.64 E9/L (ref 0.1–0.95)
MONOCYTES RELATIVE PERCENT: 9.8 % (ref 2–12)
NEUTROPHILS ABSOLUTE: 4.85 E9/L (ref 1.8–7.3)
NEUTROPHILS RELATIVE PERCENT: 74.4 % (ref 43–80)
O2 CONTENT: 17.3 ML/DL
O2 SATURATION: 96.7 % (ref 92–98.5)
O2HB: 95.5 % (ref 94–97)
OPERATOR ID: 1632
PATIENT TEMP: 37 C
PCO2: 57 MMHG (ref 35–45)
PDW BLD-RTO: 15.3 FL (ref 11.5–15)
PH BLOOD GAS: 7.43 (ref 7.35–7.45)
PLATELET # BLD: 163 E9/L (ref 130–450)
PMV BLD AUTO: 10.9 FL (ref 7–12)
PO2: 87.2 MMHG (ref 75–100)
POTASSIUM SERPL-SCNC: 3.6 MMOL/L (ref 3.5–5)
RBC # BLD: 4.83 E12/L (ref 3.5–5.5)
SODIUM BLD-SCNC: 139 MMOL/L (ref 132–146)
SOURCE, BLOOD GAS: ABNORMAL
THB: 12.8 G/DL (ref 11.5–16.5)
TIME ANALYZED: 2257
TOTAL PROTEIN: 6.9 G/DL (ref 6.4–8.3)
TROPONIN, HIGH SENSITIVITY: 24 NG/L (ref 0–9)
WBC # BLD: 6.5 E9/L (ref 4.5–11.5)

## 2023-03-04 PROCEDURE — 83735 ASSAY OF MAGNESIUM: CPT

## 2023-03-04 PROCEDURE — 83605 ASSAY OF LACTIC ACID: CPT

## 2023-03-04 PROCEDURE — 82962 GLUCOSE BLOOD TEST: CPT

## 2023-03-04 PROCEDURE — 74177 CT ABD & PELVIS W/CONTRAST: CPT

## 2023-03-04 PROCEDURE — 2580000003 HC RX 258: Performed by: RADIOLOGY

## 2023-03-04 PROCEDURE — 6360000004 HC RX CONTRAST MEDICATION: Performed by: RADIOLOGY

## 2023-03-04 PROCEDURE — 6360000002 HC RX W HCPCS: Performed by: STUDENT IN AN ORGANIZED HEALTH CARE EDUCATION/TRAINING PROGRAM

## 2023-03-04 PROCEDURE — 85025 COMPLETE CBC W/AUTO DIFF WBC: CPT

## 2023-03-04 PROCEDURE — 71045 X-RAY EXAM CHEST 1 VIEW: CPT

## 2023-03-04 PROCEDURE — 93005 ELECTROCARDIOGRAM TRACING: CPT | Performed by: STUDENT IN AN ORGANIZED HEALTH CARE EDUCATION/TRAINING PROGRAM

## 2023-03-04 PROCEDURE — 80053 COMPREHEN METABOLIC PANEL: CPT

## 2023-03-04 PROCEDURE — 83690 ASSAY OF LIPASE: CPT

## 2023-03-04 PROCEDURE — 36415 COLL VENOUS BLD VENIPUNCTURE: CPT

## 2023-03-04 PROCEDURE — 82805 BLOOD GASES W/O2 SATURATION: CPT

## 2023-03-04 PROCEDURE — 84484 ASSAY OF TROPONIN QUANT: CPT

## 2023-03-04 PROCEDURE — 99285 EMERGENCY DEPT VISIT HI MDM: CPT

## 2023-03-04 PROCEDURE — 96374 THER/PROPH/DIAG INJ IV PUSH: CPT

## 2023-03-04 RX ORDER — FENTANYL CITRATE 50 UG/ML
25 INJECTION, SOLUTION INTRAMUSCULAR; INTRAVENOUS ONCE
Status: COMPLETED | OUTPATIENT
Start: 2023-03-04 | End: 2023-03-04

## 2023-03-04 RX ORDER — SODIUM CHLORIDE 0.9 % (FLUSH) 0.9 %
10 SYRINGE (ML) INJECTION PRN
Status: COMPLETED | OUTPATIENT
Start: 2023-03-04 | End: 2023-03-04

## 2023-03-04 RX ADMIN — Medication 10 ML: at 23:41

## 2023-03-04 RX ADMIN — FENTANYL CITRATE 25 MCG: 50 INJECTION, SOLUTION INTRAMUSCULAR; INTRAVENOUS at 22:06

## 2023-03-04 RX ADMIN — IOPAMIDOL 75 ML: 755 INJECTION, SOLUTION INTRAVENOUS at 23:41

## 2023-03-04 ASSESSMENT — LIFESTYLE VARIABLES: HOW OFTEN DO YOU HAVE A DRINK CONTAINING ALCOHOL: NEVER

## 2023-03-04 ASSESSMENT — PAIN DESCRIPTION - LOCATION: LOCATION: ABDOMEN

## 2023-03-04 ASSESSMENT — PAIN DESCRIPTION - DESCRIPTORS: DESCRIPTORS: CRAMPING

## 2023-03-04 ASSESSMENT — PAIN DESCRIPTION - ORIENTATION: ORIENTATION: RIGHT;LEFT;UPPER

## 2023-03-04 ASSESSMENT — PAIN - FUNCTIONAL ASSESSMENT: PAIN_FUNCTIONAL_ASSESSMENT: 0-10

## 2023-03-05 PROBLEM — I50.9 ACUTE DECOMPENSATED HEART FAILURE (HCC): Status: ACTIVE | Noted: 2023-03-05

## 2023-03-05 LAB
ALBUMIN SERPL-MCNC: 4 G/DL (ref 3.5–5.2)
ALP BLD-CCNC: 84 U/L (ref 35–104)
ALT SERPL-CCNC: 19 U/L (ref 0–32)
ANION GAP SERPL CALCULATED.3IONS-SCNC: 11 MMOL/L (ref 7–16)
ANION GAP SERPL CALCULATED.3IONS-SCNC: 8 MMOL/L (ref 7–16)
AST SERPL-CCNC: 19 U/L (ref 0–31)
BACTERIA: ABNORMAL /HPF
BILIRUB SERPL-MCNC: 0.6 MG/DL (ref 0–1.2)
BILIRUBIN URINE: NEGATIVE
BLOOD, URINE: NEGATIVE
BUN BLDV-MCNC: 19 MG/DL (ref 6–23)
BUN BLDV-MCNC: 21 MG/DL (ref 6–23)
CALCIUM SERPL-MCNC: 9.3 MG/DL (ref 8.6–10.2)
CALCIUM SERPL-MCNC: 9.3 MG/DL (ref 8.6–10.2)
CHLORIDE BLD-SCNC: 92 MMOL/L (ref 98–107)
CHLORIDE BLD-SCNC: 96 MMOL/L (ref 98–107)
CLARITY: CLEAR
CO2: 35 MMOL/L (ref 22–29)
CO2: 42 MMOL/L (ref 22–29)
COLOR: YELLOW
CREAT SERPL-MCNC: 1 MG/DL (ref 0.5–1)
CREAT SERPL-MCNC: 1 MG/DL (ref 0.5–1)
EPITHELIAL CELLS, UA: ABNORMAL /HPF
GFR SERPL CREATININE-BSD FRML MDRD: >60 ML/MIN/1.73
GFR SERPL CREATININE-BSD FRML MDRD: >60 ML/MIN/1.73
GLUCOSE BLD-MCNC: 103 MG/DL (ref 74–99)
GLUCOSE BLD-MCNC: 117 MG/DL (ref 74–99)
GLUCOSE URINE: NEGATIVE MG/DL
KETONES, URINE: NEGATIVE MG/DL
LACTIC ACID, SEPSIS: 1 MMOL/L (ref 0.5–1.9)
LEUKOCYTE ESTERASE, URINE: NEGATIVE
MAGNESIUM: 1.8 MG/DL (ref 1.6–2.6)
NITRITE, URINE: POSITIVE
PH UA: 7 (ref 5–9)
POTASSIUM REFLEX MAGNESIUM: 3.2 MMOL/L (ref 3.5–5)
POTASSIUM SERPL-SCNC: 3.9 MMOL/L (ref 3.5–5)
PRO-BNP: 2420 PG/ML (ref 0–125)
PROTEIN UA: NEGATIVE MG/DL
RBC UA: ABNORMAL /HPF (ref 0–2)
SODIUM BLD-SCNC: 142 MMOL/L (ref 132–146)
SODIUM BLD-SCNC: 142 MMOL/L (ref 132–146)
SPECIFIC GRAVITY UA: <=1.005 (ref 1–1.03)
TOTAL PROTEIN: 6.7 G/DL (ref 6.4–8.3)
TROPONIN, HIGH SENSITIVITY: 18 NG/L (ref 0–9)
UROBILINOGEN, URINE: 0.2 E.U./DL
WBC UA: ABNORMAL /HPF (ref 0–5)

## 2023-03-05 PROCEDURE — 2580000003 HC RX 258: Performed by: FAMILY MEDICINE

## 2023-03-05 PROCEDURE — 80048 BASIC METABOLIC PNL TOTAL CA: CPT

## 2023-03-05 PROCEDURE — G0378 HOSPITAL OBSERVATION PER HR: HCPCS

## 2023-03-05 PROCEDURE — 2140000000 HC CCU INTERMEDIATE R&B

## 2023-03-05 PROCEDURE — 94660 CPAP INITIATION&MGMT: CPT

## 2023-03-05 PROCEDURE — 96375 TX/PRO/DX INJ NEW DRUG ADDON: CPT

## 2023-03-05 PROCEDURE — 6360000002 HC RX W HCPCS: Performed by: FAMILY MEDICINE

## 2023-03-05 PROCEDURE — 83605 ASSAY OF LACTIC ACID: CPT

## 2023-03-05 PROCEDURE — 87186 SC STD MICRODIL/AGAR DIL: CPT

## 2023-03-05 PROCEDURE — 6370000000 HC RX 637 (ALT 250 FOR IP): Performed by: STUDENT IN AN ORGANIZED HEALTH CARE EDUCATION/TRAINING PROGRAM

## 2023-03-05 PROCEDURE — 83735 ASSAY OF MAGNESIUM: CPT

## 2023-03-05 PROCEDURE — 94640 AIRWAY INHALATION TREATMENT: CPT

## 2023-03-05 PROCEDURE — 99222 1ST HOSP IP/OBS MODERATE 55: CPT | Performed by: FAMILY MEDICINE

## 2023-03-05 PROCEDURE — 6360000002 HC RX W HCPCS: Performed by: STUDENT IN AN ORGANIZED HEALTH CARE EDUCATION/TRAINING PROGRAM

## 2023-03-05 PROCEDURE — 81001 URINALYSIS AUTO W/SCOPE: CPT

## 2023-03-05 PROCEDURE — 87077 CULTURE AEROBIC IDENTIFY: CPT

## 2023-03-05 PROCEDURE — 84484 ASSAY OF TROPONIN QUANT: CPT

## 2023-03-05 PROCEDURE — 83880 ASSAY OF NATRIURETIC PEPTIDE: CPT

## 2023-03-05 PROCEDURE — 96372 THER/PROPH/DIAG INJ SC/IM: CPT

## 2023-03-05 PROCEDURE — 87088 URINE BACTERIA CULTURE: CPT

## 2023-03-05 PROCEDURE — 36415 COLL VENOUS BLD VENIPUNCTURE: CPT

## 2023-03-05 PROCEDURE — 80053 COMPREHEN METABOLIC PANEL: CPT

## 2023-03-05 PROCEDURE — 6370000000 HC RX 637 (ALT 250 FOR IP): Performed by: FAMILY MEDICINE

## 2023-03-05 PROCEDURE — 2700000000 HC OXYGEN THERAPY PER DAY

## 2023-03-05 RX ORDER — ALBUTEROL SULFATE 2.5 MG/3ML
2.5 SOLUTION RESPIRATORY (INHALATION) EVERY 6 HOURS PRN
Status: DISCONTINUED | OUTPATIENT
Start: 2023-03-05 | End: 2023-03-07 | Stop reason: HOSPADM

## 2023-03-05 RX ORDER — FAMOTIDINE 20 MG/1
20 TABLET, FILM COATED ORAL DAILY
Status: DISCONTINUED | OUTPATIENT
Start: 2023-03-05 | End: 2023-03-07 | Stop reason: HOSPADM

## 2023-03-05 RX ORDER — ESCITALOPRAM OXALATE 10 MG/1
10 TABLET ORAL DAILY
Status: DISCONTINUED | OUTPATIENT
Start: 2023-03-05 | End: 2023-03-07 | Stop reason: HOSPADM

## 2023-03-05 RX ORDER — POTASSIUM CHLORIDE 20 MEQ/1
40 TABLET, EXTENDED RELEASE ORAL 2 TIMES DAILY
Status: COMPLETED | OUTPATIENT
Start: 2023-03-05 | End: 2023-03-05

## 2023-03-05 RX ORDER — FUROSEMIDE 40 MG/1
40 TABLET ORAL DAILY
Status: DISCONTINUED | OUTPATIENT
Start: 2023-03-05 | End: 2023-03-07 | Stop reason: HOSPADM

## 2023-03-05 RX ORDER — FUROSEMIDE 10 MG/ML
20 INJECTION INTRAMUSCULAR; INTRAVENOUS ONCE
Status: COMPLETED | OUTPATIENT
Start: 2023-03-05 | End: 2023-03-05

## 2023-03-05 RX ORDER — OXYCODONE HYDROCHLORIDE AND ACETAMINOPHEN 5; 325 MG/1; MG/1
1 TABLET ORAL EVERY 6 HOURS PRN
Status: DISCONTINUED | OUTPATIENT
Start: 2023-03-05 | End: 2023-03-07 | Stop reason: HOSPADM

## 2023-03-05 RX ORDER — HYDRALAZINE HYDROCHLORIDE 25 MG/1
25 TABLET, FILM COATED ORAL 2 TIMES DAILY
Status: DISCONTINUED | OUTPATIENT
Start: 2023-03-05 | End: 2023-03-07 | Stop reason: HOSPADM

## 2023-03-05 RX ORDER — BUDESONIDE 0.25 MG/2ML
0.25 INHALANT ORAL 2 TIMES DAILY
Status: DISCONTINUED | OUTPATIENT
Start: 2023-03-05 | End: 2023-03-07 | Stop reason: HOSPADM

## 2023-03-05 RX ORDER — ENOXAPARIN SODIUM 100 MG/ML
40 INJECTION SUBCUTANEOUS DAILY
Status: DISCONTINUED | OUTPATIENT
Start: 2023-03-05 | End: 2023-03-07 | Stop reason: HOSPADM

## 2023-03-05 RX ORDER — SODIUM CHLORIDE 9 MG/ML
INJECTION, SOLUTION INTRAVENOUS PRN
Status: DISCONTINUED | OUTPATIENT
Start: 2023-03-05 | End: 2023-03-07 | Stop reason: HOSPADM

## 2023-03-05 RX ORDER — ONDANSETRON 4 MG/1
4 TABLET, ORALLY DISINTEGRATING ORAL EVERY 8 HOURS PRN
Status: DISCONTINUED | OUTPATIENT
Start: 2023-03-05 | End: 2023-03-07 | Stop reason: HOSPADM

## 2023-03-05 RX ORDER — DESMOPRESSIN ACETATE 0.1 MG/1
200 TABLET ORAL 2 TIMES DAILY
Status: DISCONTINUED | OUTPATIENT
Start: 2023-03-05 | End: 2023-03-07 | Stop reason: HOSPADM

## 2023-03-05 RX ORDER — SODIUM CHLORIDE 0.9 % (FLUSH) 0.9 %
5-40 SYRINGE (ML) INJECTION PRN
Status: DISCONTINUED | OUTPATIENT
Start: 2023-03-05 | End: 2023-03-07 | Stop reason: HOSPADM

## 2023-03-05 RX ORDER — LANOLIN ALCOHOL/MO/W.PET/CERES
400 CREAM (GRAM) TOPICAL DAILY
Status: DISCONTINUED | OUTPATIENT
Start: 2023-03-05 | End: 2023-03-07 | Stop reason: HOSPADM

## 2023-03-05 RX ORDER — PREDNISONE 1 MG/1
5 TABLET ORAL DAILY
Status: DISCONTINUED | OUTPATIENT
Start: 2023-03-05 | End: 2023-03-07 | Stop reason: HOSPADM

## 2023-03-05 RX ORDER — OMEGA-3-ACID ETHYL ESTERS 1 G/1
1 CAPSULE, LIQUID FILLED ORAL 2 TIMES DAILY
Status: DISCONTINUED | OUTPATIENT
Start: 2023-03-05 | End: 2023-03-07 | Stop reason: HOSPADM

## 2023-03-05 RX ORDER — ACETAMINOPHEN 650 MG/1
650 SUPPOSITORY RECTAL EVERY 6 HOURS PRN
Status: DISCONTINUED | OUTPATIENT
Start: 2023-03-05 | End: 2023-03-07 | Stop reason: HOSPADM

## 2023-03-05 RX ORDER — ACETAMINOPHEN 325 MG/1
650 TABLET ORAL EVERY 6 HOURS PRN
Status: DISCONTINUED | OUTPATIENT
Start: 2023-03-05 | End: 2023-03-07 | Stop reason: HOSPADM

## 2023-03-05 RX ORDER — LEVOTHYROXINE SODIUM 0.07 MG/1
75 TABLET ORAL DAILY
Status: DISCONTINUED | OUTPATIENT
Start: 2023-03-05 | End: 2023-03-07 | Stop reason: HOSPADM

## 2023-03-05 RX ORDER — POLYETHYLENE GLYCOL 3350 17 G/17G
17 POWDER, FOR SOLUTION ORAL DAILY
Status: DISCONTINUED | OUTPATIENT
Start: 2023-03-05 | End: 2023-03-07 | Stop reason: HOSPADM

## 2023-03-05 RX ORDER — HYDRALAZINE HYDROCHLORIDE 10 MG/1
10 TABLET, FILM COATED ORAL 2 TIMES DAILY
Status: DISCONTINUED | OUTPATIENT
Start: 2023-03-05 | End: 2023-03-05

## 2023-03-05 RX ORDER — ARFORMOTEROL TARTRATE 15 UG/2ML
15 SOLUTION RESPIRATORY (INHALATION) 2 TIMES DAILY
Status: DISCONTINUED | OUTPATIENT
Start: 2023-03-05 | End: 2023-03-07 | Stop reason: HOSPADM

## 2023-03-05 RX ORDER — SODIUM CHLORIDE 0.9 % (FLUSH) 0.9 %
5-40 SYRINGE (ML) INJECTION EVERY 12 HOURS SCHEDULED
Status: DISCONTINUED | OUTPATIENT
Start: 2023-03-05 | End: 2023-03-07 | Stop reason: HOSPADM

## 2023-03-05 RX ORDER — SENNA AND DOCUSATE SODIUM 50; 8.6 MG/1; MG/1
2 TABLET, FILM COATED ORAL DAILY
Status: DISCONTINUED | OUTPATIENT
Start: 2023-03-05 | End: 2023-03-07 | Stop reason: HOSPADM

## 2023-03-05 RX ORDER — ONDANSETRON 2 MG/ML
4 INJECTION INTRAMUSCULAR; INTRAVENOUS EVERY 6 HOURS PRN
Status: DISCONTINUED | OUTPATIENT
Start: 2023-03-05 | End: 2023-03-07 | Stop reason: HOSPADM

## 2023-03-05 RX ADMIN — DILTIAZEM HYDROCHLORIDE 30 MG: 30 TABLET, FILM COATED ORAL at 20:22

## 2023-03-05 RX ADMIN — Medication 10 ML: at 09:17

## 2023-03-05 RX ADMIN — ESCITALOPRAM OXALATE 10 MG: 10 TABLET, FILM COATED ORAL at 09:16

## 2023-03-05 RX ADMIN — DESMOPRESSIN ACETATE 200 MCG: 0.1 TABLET ORAL at 09:16

## 2023-03-05 RX ADMIN — FAMOTIDINE 20 MG: 20 TABLET, FILM COATED ORAL at 09:16

## 2023-03-05 RX ADMIN — LEVOTHYROXINE SODIUM 75 MCG: 0.07 TABLET ORAL at 05:56

## 2023-03-05 RX ADMIN — MAGNESIUM GLUCONATE 500 MG ORAL TABLET 400 MG: 500 TABLET ORAL at 12:07

## 2023-03-05 RX ADMIN — DESMOPRESSIN ACETATE 200 MCG: 0.1 TABLET ORAL at 20:22

## 2023-03-05 RX ADMIN — POTASSIUM CHLORIDE 40 MEQ: 1500 TABLET, EXTENDED RELEASE ORAL at 20:22

## 2023-03-05 RX ADMIN — OMEGA-3-ACID ETHYL ESTERS 1 G: 1 CAPSULE, LIQUID FILLED ORAL at 09:16

## 2023-03-05 RX ADMIN — POTASSIUM CHLORIDE 40 MEQ: 1500 TABLET, EXTENDED RELEASE ORAL at 09:16

## 2023-03-05 RX ADMIN — HYDRALAZINE HYDROCHLORIDE 25 MG: 25 TABLET, FILM COATED ORAL at 09:16

## 2023-03-05 RX ADMIN — SENNOSIDES AND DOCUSATE SODIUM 2 TABLET: 50; 8.6 TABLET ORAL at 09:16

## 2023-03-05 RX ADMIN — PREDNISONE 5 MG: 5 TABLET ORAL at 09:16

## 2023-03-05 RX ADMIN — BUDESONIDE 250 MCG: 0.25 SUSPENSION RESPIRATORY (INHALATION) at 21:21

## 2023-03-05 RX ADMIN — OXYCODONE AND ACETAMINOPHEN 1 TABLET: 5; 325 TABLET ORAL at 22:44

## 2023-03-05 RX ADMIN — OXYCODONE AND ACETAMINOPHEN 1 TABLET: 5; 325 TABLET ORAL at 09:28

## 2023-03-05 RX ADMIN — FUROSEMIDE 40 MG: 40 TABLET ORAL at 09:16

## 2023-03-05 RX ADMIN — ARFORMOTEROL TARTRATE 15 MCG: 15 SOLUTION RESPIRATORY (INHALATION) at 21:21

## 2023-03-05 RX ADMIN — METOPROLOL SUCCINATE 150 MG: 100 TABLET, EXTENDED RELEASE ORAL at 09:16

## 2023-03-05 RX ADMIN — DILTIAZEM HYDROCHLORIDE 30 MG: 30 TABLET, FILM COATED ORAL at 09:16

## 2023-03-05 RX ADMIN — BUDESONIDE 250 MCG: 0.25 SUSPENSION RESPIRATORY (INHALATION) at 07:55

## 2023-03-05 RX ADMIN — POLYETHYLENE GLYCOL 3350 17 G: 17 POWDER, FOR SOLUTION ORAL at 09:17

## 2023-03-05 RX ADMIN — Medication 10 ML: at 20:24

## 2023-03-05 RX ADMIN — FUROSEMIDE 20 MG: 10 INJECTION, SOLUTION INTRAMUSCULAR; INTRAVENOUS at 01:30

## 2023-03-05 RX ADMIN — ONDANSETRON 4 MG: 4 TABLET, ORALLY DISINTEGRATING ORAL at 20:28

## 2023-03-05 RX ADMIN — ENOXAPARIN SODIUM 40 MG: 100 INJECTION SUBCUTANEOUS at 09:17

## 2023-03-05 RX ADMIN — OXYCODONE AND ACETAMINOPHEN 1 TABLET: 5; 325 TABLET ORAL at 16:19

## 2023-03-05 RX ADMIN — DILTIAZEM HYDROCHLORIDE 30 MG: 30 TABLET, FILM COATED ORAL at 14:00

## 2023-03-05 RX ADMIN — ACETAMINOPHEN 650 MG: 325 TABLET ORAL at 05:56

## 2023-03-05 ASSESSMENT — PAIN DESCRIPTION - LOCATION
LOCATION: ABDOMEN
LOCATION: ABDOMEN
LOCATION: ABDOMEN;LEG
LOCATION: ABDOMEN

## 2023-03-05 ASSESSMENT — ENCOUNTER SYMPTOMS
SHORTNESS OF BREATH: 1
VOMITING: 0
DIARRHEA: 1
NAUSEA: 0
COUGH: 0
ABDOMINAL PAIN: 1
BLOOD IN STOOL: 0
BACK PAIN: 0
SORE THROAT: 0
RHINORRHEA: 0

## 2023-03-05 ASSESSMENT — PAIN DESCRIPTION - ORIENTATION
ORIENTATION: MID
ORIENTATION: MID

## 2023-03-05 ASSESSMENT — PAIN SCALES - GENERAL
PAINLEVEL_OUTOF10: 8
PAINLEVEL_OUTOF10: 7
PAINLEVEL_OUTOF10: 8
PAINLEVEL_OUTOF10: 8

## 2023-03-05 NOTE — PROGRESS NOTES
[facility] Attending    S: 78 y.o. female with a history of afib (off of anticoagulation due to gi bleeding), HFpEF, chronic hypoxic respiratory failure on 4-6L NC, COPD, DI, cdiff, htn, hypothyroidism, VAIN III, ischemic colitis, severe pulmonary hypertension, sarcoidosis, ventral hernia, fibromyalgia, LINDSAY and avn on chronic opioid therapy who presented for concerns of abdominal pain. Patient reports last bm was yesterday but it has been hard and usually has a bm several times per day. Pt reported that she had been taking her lasix every other day as it caused her to urinate more frequently. She was recently admitted on 2/4 and discharged on 2/8 with hypoxic respiratory failure due to chf/copd exacerbation with use of NIV. This morning, she is sleeping comfortably and easily aroused and conversant. Reports ongoing abdominal pain but has not received her home percocet since early yesterday. O: VS- Blood pressure (!) 171/100, pulse 65, temperature 97.4 °F (36.3 °C), temperature source Oral, resp. rate 20, height 5' (1.524 m), weight 165 lb (74.8 kg), SpO2 99 %, not currently breastfeeding. Exam is as noted by resident with the following changes, additions or corrections:  Gen: NAD on oxygen  HEENT: NCAT, PERRL, MMM  Neck: supple  CV-RRR  Lungs-diminished bs b/l  ABD-soft . Large vental hernia palpated with reduction, minimal right sided ttp  Ext-no C/C; tr edema b/l lower legs      Impressions:   Principal Problem:    Acute decompensated heart failure ([address]  Resolved Problems:    * No resolved hospital problems. *      Plan:   with some chronic abdominal discomfort likely related to ongoing chronic ventral hernia and constipation. Restart home pain meds and increase bowel regimen. CHF-appears clinically to be close to her baseline, bnp is not increased. HTN-will restart on home percocet and if bp elevations persist, will increase dose of hydralazine. Replete potassium and magnesium and follow levels. Disposition planning for discharge to home today or tomorrow. Attending Physician Statement  I have reviewed the chart and seen the patient with the resident(s). I personally reviewed images, EKG's and similar tests, if present. I personally reviewed and performed key elements of the history and exam.  I have reviewed and confirmed student and/or resident history and exam with changes as indicated above. I agree with the assessment, plan and orders as documented by the resident. Please refer to the resident and/or student note for additional information.       Facundo Sol MD

## 2023-03-05 NOTE — PROGRESS NOTES
Admitted at this time. /100. Patient is A/O x4. Upset that Percocet has been placed on hold. Medicated with Tylenol 650mg po for abdominal pain. MD made aware thru perfect serve. Awaiting further instruction.

## 2023-03-05 NOTE — ED PROVIDER NOTES
[facility] [address]  ED Provider Note  Department of Emergency Medicine     ED Room: 14A14      Written by: Prem Loza DO  Patient Name: Bobby Roberts  Attending Provider: Macy Hankins MD  Admit Date: 3/4/2023  9:03 PM  MRN: [id number]    : 1955        Chief Complaint   Patient presents with    Abdominal Pain     Diarrhea x 1 incident today. Abd pain + nausea x 2 days. Per EMS pt  called stating pt kept complaining about abd pain and not letting him sleep x 2 days. Altered Mental Status     PT reports feeling confused x 2 days. Per EMS  didn't report this. PT has delayed speech but is a&ox4. BS @ triage 68    - Chief complaint    HPI   Bobby Roberts is a 78 y.o. female presenting to the ED for evaluation of Abdominal Pain (Diarrhea x 1 incident today. Abd pain + nausea x 2 days. Per EMS pt  called stating pt kept complaining about abd pain and not letting him sleep x 2 days.) and Altered Mental Status (PT reports feeling confused x 2 days. Per EMS  didn't report this. PT has delayed speech but is a&ox4. BS @ triage 68)      History obtained from the patient. Patient is a 77 y/o female with pmhx of CHF on lasix 40 mg daily, PAF on eliquis, chronic ventral hernia, HLD, HTN, COPD on 6L NC O2 at baseline. She is presenting to the ED for evaluation of abdominal pain, fatigue, and SOB; please note, per triage report patient presents for AMS; patient is not altered, but tells me that she has been feeling fatigued and not like herself for a few days. She denies confusion on my evaluation. She states the pain is near her hernia site; does state she had one episode of non-bloody non-black diarrhea today as well. No nausea or vomiting. She reports HICKEY and orthopnea; reports she is only taking her lasix every other day if that, due to it making her urinate frequently. She denies any chest pain or palpitations.  Denies any dysuria or flank pain. Denies any headaches, changes in vision, dizziness, or weakness anywhere. She has not had any falls or injuries. Denies LE edema or tenderness. Review of Systems   Constitutional:  Positive for fatigue. Negative for chills and fever. HENT:  Negative for rhinorrhea and sore throat. Eyes:  Negative for visual disturbance. Respiratory:  Positive for shortness of breath. Negative for cough. Cardiovascular:  Negative for chest pain and palpitations. Gastrointestinal:  Positive for abdominal pain and diarrhea. Negative for blood in stool, nausea and vomiting. Genitourinary:  Negative for difficulty urinating and dysuria. Musculoskeletal:  Negative for back pain and myalgias. Skin:  Negative for rash and wound. Neurological:  Negative for weakness and headaches. Psychiatric/Behavioral:  Negative for confusion. All other systems reviewed and are negative. Physical Exam  Vitals and nursing note reviewed. Constitutional:       General: She is not in acute distress. Appearance: She is not toxic-appearing. HENT:      Head: Normocephalic and atraumatic. Right Ear: External ear normal.      Left Ear: External ear normal.      Nose: Nose normal. No rhinorrhea. Mouth/Throat:      Mouth: Mucous membranes are moist.      Pharynx: Oropharynx is clear. Eyes:      Extraocular Movements: Extraocular movements intact. Conjunctiva/sclera: Conjunctivae normal.      Pupils: Pupils are equal, round, and reactive to light. Cardiovascular:      Rate and Rhythm: Normal rate and regular rhythm. Pulses: Normal pulses. Heart sounds: Normal heart sounds. Pulmonary:      Effort: Pulmonary effort is normal. No respiratory distress. Breath sounds: Rales (bibasilar) present. No wheezing. Abdominal:      General: Abdomen is protuberant. Bowel sounds are normal.      Palpations: Abdomen is soft. Tenderness:  There is abdominal tenderness (mild, at ventral hernia site; it is soft and reducible). There is no right CVA tenderness, left CVA tenderness, guarding or rebound. Hernia: A hernia is present. Hernia is present in the ventral area. Comments: Reducible ventral hernia, large; mild skin breakdown noted   Musculoskeletal:         General: Normal range of motion. Cervical back: Normal range of motion and neck supple. Right lower leg: Edema present. Skin:     General: Skin is warm and dry. Capillary Refill: Capillary refill takes less than 2 seconds. Coloration: Skin is not jaundiced or pale. Neurological:      General: No focal deficit present. Mental Status: She is alert and oriented to person, place, and time. GCS: GCS eye subscore is 4. GCS verbal subscore is 5. GCS motor subscore is 6. Sensory: No sensory deficit. Motor: No weakness. Psychiatric:         Mood and Affect: Mood normal.         Behavior: Behavior normal.        Procedures      Medical Decision Making: This is a 78 y.o. female presenting for evaluation of abdominal pain, SOB, fatigue; she is not altered and denies confusion, states she just feels fatigued and not herself the past few days. Please see HPI for further details, additional history and chart review. On my evaluation today, patient is alert, oriented, NAD. Vitals are stable. She is on 6L NC O2 which is her baseline. Exam findings are as documented above; abdomen protuberant 2/2 body habitus and ventral hernia; hernia is mildly tender without rebound or guarding; it is soft and reducible. This is some overlying skin breakdown. Lungs with bibasilar rales. Trace b/l LE edema, non pitting. No focal neurologic deficits. Patient AAOx4. Labs reviewed, troponins are 24 and 18, delta -6 though similar to previous values. EKG non-ischemic. BNP >2400, though less than recent values.  UA +nitrites and bacteria, 0-1 WBCs; urine culture pending; as patient reports no urinary symptoms at this time, will defer treatment while awaiting culture. CXR shows pulmonary edema. Patient given a dose of 20 mg IV lasix. CT abd/pelvis shows no acute abnormality; the ventral abdominal hernia contains loops of bowel without obstruction. Given these findings, patient's reporting of non-compliance with lasix, as well as orthopnea and HICKEY, she will be admitted for further evaluation and management of CHF exacerbation. Results and plan were discussed, patient is amenable. Family medicine consulted for admission, patient is accepted. While not exhaustive, the following diagnoses and their severity were considered: intra-abdominal infection, UTI, CHF exacerbation, PNA, incarcerated or strangulated ventral hernia, ACS. Independent interpretation of Laboratory tests by Kayden Arreola DO: BNP 2,420; troponins are 24 and 18, delta -6; CMP cr 1.2, baseline ~1.0; bicarb 40, slightly above baseline; ABG shows chronic respiratory acidosis with metabolic compensation. Lipase 21, LA 1.2. CBC WNLs. UA + nitrites and bacteria, urine culture pending. Independent interpretation of Radiology tests by Kayden Arreola DO: CT abd/pelvis ventral abdominal hernia no obstruction; no other acute abnormality noted. CXR findings pulmonary edema. EKG reviewed and interpreted by me, TIME 2112 : This EKG is signed by emergency department physician. Sinus rhythm with PACs; no ST elevations; rate 62; nonspecific changes as compared to most recent EKG. Labs & imaging were reviewed and interpreted, see RESULTS. I have personally reviewed all laboratory and imaging results for this patient. Are there any additional factors to consider that affect care (uninsured, homeless, illiterate, history from another source, etc.) (If yes, which ones).   No      Name and Route of medications administered in the ED:  Medications   fentaNYL (SUBLIMAZE) injection 25 mcg (25 mcg IntraVENous Given 3/4/23 2206)   iopamidol (ISOVUE-370) 76 % injection 75 mL (75 mLs IntraVENous Given 3/4/23 2341)   sodium chloride flush 0.9 % injection 10 mL (10 mLs IntraVENous Given 3/4/23 2341)           Re-Evaluations:  ED Course as of 03/05/23 0118   Sat Mar 04, 2023   2217 NIHSS 0  Patient is not alerted  She just states she has felt fatigued for a few days; she denies being confused. Also, EMS reports patient's  told them the patient complaining about her abdominal pain has not been allowing him to sleep. [VG]   Sun Mar 05, 2023   0101 Spoke with Dr. Angela Maya, discussed case, patient is accepted for admission.  [VG]      ED Course User Index  [VG] Johnson Gamez, DO           Please see ED course for any additional MDM documentation. I have discussed this patient with my attending, who has seen the patient and agrees with this disposition.     Patient was seen and evaluated by myself and my attending Marisela Park MD. Assessment and Plan discussed with attending provider, please see attestation for final plan of care.             --------------------------------------------- PAST HISTORY ---------------------------------------------  Past Medical History:  has a past medical history of A-fib ([address] Able to transfer from chair to wheelchair, Abnormal mammogram, Acute on chronic respiratory failure ([address] Anemia, Anemia due to chronic illness, Ankle fracture, left, 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[address] Backache, Benign hypertension, CHF (congestive heart failure) ([address] Chronic back pain, Chronic kidney disease, Chronic pain disorder, Chronic, continuous use of opioids, Compression fracture of thoracic vertebra ([address] COPD (chronic obstructive pulmonary disease) ([address] Debility, Deformity of ankle and foot, acquired, Depression, Diabetes insipidus ([address] Diverticulitis, Dry eyes, Encephalopathy acute, Gait disturbance, GERD (gastroesophageal reflux disease), Hemorrhoids, Hernia, History of blood transfusion, History of Clostridium difficile, Hyperlipidemia, Hypothyroidism, Ischemic colitis, enteritis, or enterocolitis ([address] Long term (current) use of systemic steroids, Long-term current use of steroids, Lumbar disc herniation, Myalgia and myositis, unspecified, Nephrosclerosis, Nonunion of fracture, Osteoarthritis, PAF (paroxysmal atrial fibrillation) ([address] 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[address] Pulmonary hypertension ([address] Pulmonary sarcoidosis ([address] Rectal bleeding, Rhabdomyolysis, Steroid-induced avascular necrosis of shoulder ([address] Tibia fracture, and Ventral hernia without obstruction or gangrene. Past Surgical History:  has a past surgical history that includes fracture surgery (2010); Kyphosis surgery; Lumbar disc surgery; Tibia / fibia lengthening; other surgical history (11/1/11); Abdomen surgery (2008); Echo Complete (4/22/2013); ECHO Compl W Dop Color Flow (8/16/2015); Upper gastrointestinal endoscopy (1/4/2016); Hemorrhoid surgery (12/08/2016); Colonoscopy (2008); Colonoscopy (04/11/2014); Colonoscopy (11/23/2015); Colonoscopy (10/24/2016); Colonoscopy (07/26/2017); Upper gastrointestinal endoscopy (07/26/2017); sigmoidoscopy (N/A, 3/19/2021); and other surgical history (12/14/2021). Social History:  reports that she quit smoking about 26 years ago. Her smoking use included cigarettes. She started smoking about 52 years ago. She has a 12.50 pack-year smoking history. She has never used smokeless tobacco. She reports that she does not currently use drugs. She reports that she does not drink alcohol. Family History: family history includes Alcohol Abuse in her sister; Arthritis in her father and mother; Diabetes in her father and mother; High Blood Pressure in her father, mother, and sister; Substance Abuse in her brother and sister. Unless otherwise noted, family history is non contributory. The patients home medications have been reviewed.     Allergies: Patient has no known allergies.     -------------------------------------------------- RESULTS -------------------------------------------------    LABS:  Results for orders placed or performed during the hospital encounter of 03/04/23   Magnesium   Result Value Ref Range    Magnesium 1.9 1.6 - 2.6 mg/dL   Lipase   Result Value Ref Range    Lipase 21 13 - 60 U/L   Troponin   Result Value Ref Range    Troponin, High Sensitivity 24 (H) 0 - 9 ng/L   CMP   Result Value Ref Range    Sodium 139 132 - 146 mmol/L    Potassium 3.6 3.5 - 5.0 mmol/L    Chloride 93 (L) 98 - 107 mmol/L    CO2 40 (H) 22 - 29 mmol/L    Anion Gap 6 (L) 7 - 16 mmol/L    Glucose 74 74 - 99 mg/dL    BUN 25 (H) 6 - 23 mg/dL    Creatinine 1.2 (H) 0.5 - 1.0 mg/dL    Est, Glom Filt Rate 50 >=60 mL/min/1.73    Calcium 9.2 8.6 - 10.2 mg/dL    Total Protein 6.9 6.4 - 8.3 g/dL    Albumin 4.0 3.5 - 5.2 g/dL    Total Bilirubin 0.4 0.0 - 1.2 mg/dL    Alkaline Phosphatase 86 35 - 104 U/L    ALT 19 0 - 32 U/L    AST 20 0 - 31 U/L   CBC with Auto Differential   Result Value Ref Range    WBC 6.5 4.5 - 11.5 E9/L    RBC 4.83 3.50 - 5.50 E12/L    Hemoglobin 12.3 11.5 - 15.5 g/dL    Hematocrit 41.0 34.0 - 48.0 %    MCV 84.9 80.0 - 99.9 fL    MCH 25.5 (L) 26.0 - 35.0 pg    MCHC 30.0 (L) 32.0 - 34.5 %    RDW 15.3 (H) 11.5 - 15.0 fL    Platelets 999 363 - 711 E9/L    MPV 10.9 7.0 - 12.0 fL    Neutrophils % 74.4 43.0 - 80.0 %    Immature Granulocytes % 0.5 0.0 - 5.0 %    Lymphocytes % 12.6 (L) 20.0 - 42.0 %    Monocytes % 9.8 2.0 - 12.0 %    Eosinophils % 2.2 0.0 - 6.0 %    Basophils % 0.5 0.0 - 2.0 %    Neutrophils Absolute 4.85 1.80 - 7.30 E9/L    Immature Granulocytes # 0.03 E9/L    Lymphocytes Absolute 0.82 (L) 1.50 - 4.00 E9/L    Monocytes Absolute 0.64 0.10 - 0.95 E9/L    Eosinophils Absolute 0.14 0.05 - 0.50 E9/L    Basophils Absolute 0.03 0.00 - 0.20 E9/L   Lactate, Sepsis   Result Value Ref Range    Lactic Acid, Sepsis 1.2 0.5 - 1.9 mmol/L   Blood Gas, Arterial   Result Value Ref Range Date Analyzed [id number]     Time Analyzed 2257     Source: Blood Arterial     pH, Blood Gas 7.431 7.350 - 7.450    PCO2 57.0 (H) 35.0 - 45.0 mmHg    PO2 87.2 75.0 - 100.0 mmHg    HCO3 37.1 (H) 22.0 - 26.0 mmol/L    B.E. 10.7 (H) -3.0 - 3.0 mmol/L    O2 Sat 96.7 92.0 - 98.5 %    O2Hb 95.5 94.0 - 97.0 %    COHb 0.9 0.0 - 1.5 %    MetHb 0.3 0.0 - 1.5 %    O2 Content 17.3 mL/dL    HHb 3.3 0.0 - 5.0 %    tHb (est) 12.8 11.5 - 16.5 g/dL    Mode NC 4L     Date Of Collection      Time Collected      Pt Temp 37.0 C     ID [id number]     Lab [id number]     Critical(s) Notified . No Critical Values    Troponin   Result Value Ref Range    Troponin, High Sensitivity 18 (H) 0 - 9 ng/L   Brain Natriuretic Peptide   Result Value Ref Range    Pro-BNP 2,420 (H) 0 - 125 pg/mL   POCT Glucose   Result Value Ref Range    Meter Glucose 76 74 - 99 mg/dL       RADIOLOGY:  CT ABDOMEN PELVIS W IV CONTRAST Additional Contrast? None   Final Result   1. Large hiatal hernia containing small and large bowel loops, though without   ileus or obstruction. 2. No acute inflammatory process. 3. Chronic small airways disease within the lung bases, with patchy   atelectatic change. 4. Small hiatal hernia. 5. Cardiomegaly. 6. Other similar nonacute findings as above. XR CHEST PORTABLE   Final Result   Cardiomegaly with pulmonary edema and possibly small pleural effusions   suggestive of congestive heart failure acute exacerbation. Interpreted by the radiologist unless otherwise specified.      ------------------------- NURSING NOTES AND VITALS REVIEWED ---------------------------  Date / Time Roomed:  3/4/2023  9:03 PM  ED Bed Assignment:  14A/14A-14    The nursing notes within the ED encounter and vital signs as below have been reviewed by myself.      Patient Vitals for the past 24 hrs:   BP Temp Temp src Pulse Resp SpO2 Height Weight   03/04/23 2107 139/88 98 °F (36.7 °C) Oral 59 20 96 % 5' (1.524 m) 165 lb (74.8 kg) Oxygen Saturation Interpretation: Normal on baseline 6L NC O2    The patients available past medical records and past encounters were reviewed. ------------------------------------------ PROGRESS NOTES ------------------------------------------  Re-evaluation(s):  Please see ED course    Counseling:  I have spoken with the patient and discussed todays results, in addition to providing specific details for the plan of care and counseling regarding the diagnosis and prognosis. Their questions are answered at this time and they are agreeable with the plan of admission.    --------------------------------- ADDITIONAL PROVIDER NOTES ---------------------------------  Consultations:  Please see ED course    This patient's ED course included: a personal history and physicial examination, re-evaluation prior to disposition, multiple bedside re-evaluations, IV medications, cardiac monitoring, continuous pulse oximetry, and complex medical decision making and emergency management    This patient has remained hemodynamically stable during their ED course. Diagnosis:  1. Acute on chronic congestive heart failure, unspecified heart failure type ([address]    2. Fatigue, unspecified type    3. Abdominal pain, unspecified abdominal location    4. Ventral hernia without obstruction or gangrene        Disposition:  Patient's disposition: Admit to telemetry  Patient's condition is stable. Prem Loza D.O. PGY-3     Resident Physician     Emergency Medicine      3/4/2023 11:44 PM      NOTE: This report was transcribed using voice recognition software.  Every effort was made to ensure accuracy; however, inadvertent computerized transcription errors may be present             Prem Loza DO  Resident  03/05/23 5624

## 2023-03-05 NOTE — H&P
[facility]  Family Medicine Residency Program  History and Physical    Patient:  Supriya Farley 78 y.o. female MRN: [id number]     Date of Service: 3/5/2023    Hospital Day: 2      Chief complaint: had concerns including Abdominal Pain (Diarrhea x 1 incident today. Abd pain + nausea x 2 days. Per EMS pt  called stating pt kept complaining about abd pain and not letting him sleep x 2 days.) and Altered Mental Status (PT reports feeling confused x 2 days. Per EMS  didn't report this. PT has delayed speech but is a&ox4. BS @ triage 76). History of Present Illness   The patient is a 78 y.o. female with a past medical history of COPD on 5 to 6 L of home oxygen by nasal cannula chronically, A-fib not on anticoagulation, chronic deep dilatation, HTN, HLD. Rec to the emergency department via EMS for concerns of abdominal pain, change in mental status. Upon time evaluation, patient alert and oriented x3 and stated that she was just acting goofy. She did not appear to be altered in any way. She notes her abdominal pain was her main concern which began 3 days ago. The abdominal pain is located in the left lower quadrant as well as the right lower quadrant. Does not recall a specific inciting event for the onset of the pain. Has had similar pain in the past.  Her last BM was approximately 3 days prior. She has been recently taking a stool softener called Colace to has not been on it too long. No black or tarry stools, there was pain and straining with previous BMs. No systemic symptoms such as fever, chills, abnormal weight changes, nausea, vomiting or diarrhea alternating with constipation. ED Course: Initial evaluation in the ER including CBC, CMP negative for acute derangements in electrolytes or hemoglobin. Creatinine was noted to be 1.2. BNP 2400 which appears to be half her baseline. Troponins 24 and 18 on the repeat.   Given IV Lasix 20 mg x1 ABG also noted, noted PCO2 of 57 likely due to chronic CO2 retention given that she does use noninvasive ventilation overnight.     Past Medical History:      Diagnosis Date    GUANAKO-katerine [facility])     follows with Dr Jeannette Ag to transfer from chair to wheelchair     with assistance    Abnormal mammogram 2010    Acute on chronic respiratory failure ([facility] 05/05/2017    Anemia     Anemia due to chronic illness     Ankle fracture, left 2008    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[facility] [address] 03/26/2007    Backache     Benign hypertension     CHF (congestive heart failure) ([facility])     Chronic back pain     Chronic kidney disease     stage 3    Chronic pain disorder     Chronic, continuous use of opioids     Compression fracture of thoracic vertebra ([facility])     T10    COPD (chronic obstructive pulmonary disease) ([facility] [address] [facility]     Debility     Deformity of ankle and foot, acquired 01/31/2011    Depression     Diabetes insipidus ([facility] [address]     Diverticulitis     Dry eyes     Encephalopathy acute 05/05/2017    Gait disturbance     GERD (gastroesophageal reflux disease)     Hemorrhoids 01/13/2012    Hernia     History of blood transfusion approx 05/2017    History of Clostridium difficile     negative culture 12-15-15; resolved    Hyperlipidemia     Hypothyroidism     Ischemic colitis, enteritis, or enterocolitis ([address]     Long term (current) use of systemic steroids 7/10/2022    Long-term current use of steroids     Lumbar disc herniation     Myalgia and myositis, unspecified     Nephrosclerosis     Nonunion of fracture 02/22/2011    Osteoarthritis     severe    PAF (paroxysmal atrial fibrillation) ([facility])     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[facility])     PCWP mean:26.0 PA mean: 24.00; denies any recent issues    Pulmonary hypertension (HCC)     ventricular diastolic function consistent with abnormal relaxation (stage I)    Pulmonary sarcoidosis ([facility])     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, f/u w/ PCP    Rectal bleeding     Rhabdomyolysis 05/09/2011    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 fx T10    LUMBAR DISC SURGERY      OTHER SURGICAL HISTORY  11/1/11    removal r leg external fixator    OTHER SURGICAL HISTORY  12/14/2021    Partial Vaginectomy, Endometrial Biopsy    SIGMOIDOSCOPY N/A 3/19/2021    SIGMOIDOSCOPY HEMORRHOID BANDING performed by Jung Bentley MD at [address] [place] [address] / [facility]      application TSF  2/4[birth year]    UPPER GASTROINTESTINAL ENDOSCOPY  1/4/2016    UPPER GASTROINTESTINAL ENDOSCOPY  07/26/2017       Medications Prior to Admission:    Prior to Admission medications    Medication Sig Start Date End Date Taking? Authorizing Provider   promethazine-codeine (PHENERGAN WITH CODEINE) 6.25-10 MG/5ML syrup Take 5 mLs by mouth 4 times daily as needed for Cough for up to 60 days. 2/13/23 4/14/23  Aly España MD   desmopressin (DDAVP) 0.1 MG tablet Take 2 tablets by mouth 2 times daily 2/13/23 5/14/23  Aly España MD   oxyCODONE-acetaminophen (PERCOCET) 5-325 MG per tablet Take 1 tablet by mouth every 6 hours as needed for Pain for up to 31 days.  Max Daily Amount: 4 tablets 2/10/23 3/13/23  Aly España MD   hydrALAZINE (APRESOLINE) 10 MG tablet Take 1 tablet by mouth in the morning and at bedtime 2/8/23   Tim Cardenas MD   metoprolol succinate (TOPROL XL) 50 MG extended release tablet Take 3 tablets by mouth daily 2/9/23   Tim Cardenas MD   OXYGEN Inhale 6 L/min into the lungs as needed (shortness of breath, low oxygen <90%) BMS 2/8/23   Agustin Quintero MD   BREO ELLIPTA 100-25 MCG/ACT AEPB Inhale 1 puff into the lungs daily 2/8/23   Coco Green MD   apixaban (ELIQUIS) 5 MG TABS tablet Take 5 mg by mouth 2 times daily    Historical Provider, MD   potassium chloride (KLOR-CON M) 20 MEQ TBCR extended release tablet Take 20 mEq by mouth three times a week    Historical Provider, MD   sodium chloride (OCEAN) 0.65 % nasal spray 1 spray by Nasal route as needed for Congestion    Historical Provider, MD   naloxone (NARCAN) 4 MG/0.1ML LIQD nasal spray 1 spray by Nasal route as needed for Opioid Reversal 12/26/22   Coco Green MD   docusate sodium (COLACE) 100 MG capsule Take 1 capsule by mouth 2 times daily 12/16/22   Coco Green MD   furosemide (LASIX) 40 MG tablet Take 1 tablet by mouth daily 10/1/22 12/30/22  Agustin Quintero MD   famotidine (PEPCID) 20 MG tablet Take 1 tablet by mouth daily 9/16/22   Matt Abebe MD   escitalopram (LEXAPRO) 10 MG tablet Take 1 tablet by mouth daily 9/16/22   Matt Abebe MD   omega-3 acid ethyl esters (LOVAZA) 1 g capsule Take 1 capsule by mouth in the morning and 1 capsule before bedtime. 8/16/22   Coco Green MD   dilTIAZem (CARDIZEM) 30 MG tablet Take 1 tablet by mouth in the morning and 1 tablet at noon and 1 tablet before bedtime.  7/18/22   Abbi Quintero MD   levothyroxine (SYNTHROID) 75 MCG tablet Take 1 tablet by mouth daily 7/8/22   Coco Green MD   ferrous sulfate (IRON 325) 325 (65 Fe) MG tablet Take 1 tablet by mouth 2 times daily 7/8/22   Coco Green MD   predniSONE (DELTASONE) 5 MG tablet Take 1 tablet by mouth daily 7/8/22   Coco Green MD   cycloSPORINE (RESTASIS) 0.05 % ophthalmic emulsion Place 1 drop into both eyes every 12 hours 7/8/22   Coco Green MD   albuterol sulfate HFA (PROVENTIL HFA) 108 (90 Base) MCG/ACT inhaler Inhale 2 puffs into the lungs every 6 hours as needed for Wheezing or Shortness of Breath 5/17/22   Lisandra MD Rosana   albuterol (PROVENTIL) (2.5 MG/3ML) 0.083% nebulizer solution Take 3 mLs by nebulization every 6 hours as needed for Wheezing or Shortness of Breath 2/19/22   Blanca Sweeney,    mupirocin Elliot Parents) 2 % ointment Apply topically daily 6/8/20   Homer Israel MD       Allergies:  Patient has no known allergies. Social History:   TOBACCO:   reports that she quit smoking about 26 years ago. Her smoking use included cigarettes. She started smoking about 52 years ago. She has a 12.50 pack-year smoking history. She has never used smokeless tobacco.  ETOH:   reports no history of alcohol use. REVIEW OF SYSTEMS:    Constitutional: No fever, no chills, no change in weight; good appetite  HEENT: No blurred vision, no ear problems, no sore throat, no rhinorrhea. Respiratory: No cough, no sputum production, no pleuritic chest pain, no shortness of breath  Cardiology: No angina, no dyspnea on exertion, no paroxysmal nocturnal dyspnea, no orthopnea, no palpitation, no leg swelling. Gastroenterology: No dysphagia, no reflux; + abdominal pain, no nausea or vomiting; + constipation or diarrhea.  No hematochezia   Genitourinary: No dysuria, no frequency, hesitancy; no hematuria  Musculoskeletal: no joint pain, no myalgia, no change in range of movement  Neurology: no focal weakness in extremities, no slurred speech, no double vision, no tingling or numbness sensation  Endocrinology: no temperature intolerance, no polyphagia, polydipsia or polyuria  Hematology: no increased bleeding, no bruising, no lymphadenopathy  Skin: no skin changes noticed by patient  Psychology: no depressed mood, no suicidal ideation    Physical Exam   Vitals: /88   Pulse 59   Temp 98 °F (36.7 °C) (Oral)   Resp 20   Ht 5' (1.524 m)   Wt 165 lb (74.8 kg)   LMP  (LMP Unknown)   SpO2 96%   BMI 32.22 kg/m²     General Appearance: in no acute distress, alert, and well-appearing, nontoxic  Head: normocephalic and atraumatic  Pulmonary/Chest: Crackles noted at the bilateral bases as well as the mid upper lung fields. Good air exchange, no wheezing. Cardiovascular: Irregularly irregular rhythm normal rate  Abdomen: Tenderness in the left lower quadrant as well as the right lower quadrant. Large anterior abdominal wall hernia compressible with pain. Extremities: 1+ bilateral lower extremity pitting edema  Labs and Imaging Studies   Basic Labs  Recent Labs     03/04/23 2127      K 3.6   CL 93*   CO2 40*   BUN 25*   CREATININE 1.2*   GLUCOSE 74   CALCIUM 9.2       Recent Labs     03/04/23 2127   WBC 6.5   RBC 4.83   HGB 12.3   HCT 41.0   MCV 84.9   MCH 25.5*   MCHC 30.0*   RDW 15.3*      MPV 10.9       CT ABDOMEN PELVIS W IV CONTRAST Additional Contrast? None   Final Result   1. Large hiatal hernia containing small and large bowel loops, though without   ileus or obstruction. 2. No acute inflammatory process. 3. Chronic small airways disease within the lung bases, with patchy   atelectatic change. 4. Small hiatal hernia. 5. Cardiomegaly. 6. Other similar nonacute findings as above. XR CHEST PORTABLE   Final Result   Cardiomegaly with pulmonary edema and possibly small pleural effusions   suggestive of congestive heart failure acute exacerbation. EKG: A-fib no acute ischemia. Resident's Assessment and Plan     Mental status change  -appears baseline on evaluation. Patient said she was just being goofy. Oriented fully without derangement     1. Abdominal pain  - Likely due to constipation VS large abdominal wall hernia. - Reviewed images of the CAT scan, significant stool burden. Will increase bowel regimen. - Senna S, daily MiraLAX per schedule. - Consider general surgery consultation though given that there is no obstruction or ileus likely patient will need outpatient follow-up closely.      2.  History of CHF  - Not in acute exacerbation at this time, resume oral diuretics     3. COPD  - Not in acute exacerbation at this time continue aggressive pulmonary hygiene, nocturnal noninvasive ventilation, aerosols as needed. 4.  A-fib  - Continue rate control with Lopressor  - Not on anticoagulation     5. History of diabetes insipidus  - Home desmopressin     6. Chronic pain with chronic opioid use  - Percocet every 6-8 hours as needed. Given that there was some concern for change in mental status. Hold for now.        PT/OT evaluation: As needed  DVT prophylaxis/ GI prophylaxis: Early ambulation/Lovenox/Protonix  Disposition: Telemetry    Teresa Fierro MD,   Attending physician: Dr. Debbie Vela

## 2023-03-05 NOTE — TELEPHONE ENCOUNTER
[facility] contacted Dr. Stefanie Pruett to inform of 30 day readmission risk. 's attempt to contact Dr. Stefanie Pruett was unsuccessful.      Call Back: If you need to call back to inform of disposition you can contact me at [phone]

## 2023-03-06 LAB
ALBUMIN SERPL-MCNC: 3.5 G/DL (ref 3.5–5.2)
ALP BLD-CCNC: 77 U/L (ref 35–104)
ALT SERPL-CCNC: 18 U/L (ref 0–32)
ANION GAP SERPL CALCULATED.3IONS-SCNC: 5 MMOL/L (ref 7–16)
ANION GAP SERPL CALCULATED.3IONS-SCNC: 8 MMOL/L (ref 7–16)
ANION GAP SERPL CALCULATED.3IONS-SCNC: 8 MMOL/L (ref 7–16)
AST SERPL-CCNC: 25 U/L (ref 0–31)
BASOPHILS ABSOLUTE: 0.02 E9/L (ref 0–0.2)
BASOPHILS RELATIVE PERCENT: 0.5 % (ref 0–2)
BILIRUB SERPL-MCNC: 0.4 MG/DL (ref 0–1.2)
BUN BLDV-MCNC: 26 MG/DL (ref 6–23)
BUN BLDV-MCNC: 29 MG/DL (ref 6–23)
BUN BLDV-MCNC: 30 MG/DL (ref 6–23)
CALCIUM SERPL-MCNC: 8.8 MG/DL (ref 8.6–10.2)
CALCIUM SERPL-MCNC: 8.8 MG/DL (ref 8.6–10.2)
CALCIUM SERPL-MCNC: 9.2 MG/DL (ref 8.6–10.2)
CHLORIDE BLD-SCNC: 101 MMOL/L (ref 98–107)
CHLORIDE BLD-SCNC: 98 MMOL/L (ref 98–107)
CHLORIDE BLD-SCNC: 98 MMOL/L (ref 98–107)
CO2: 37 MMOL/L (ref 22–29)
CO2: 37 MMOL/L (ref 22–29)
CO2: 40 MMOL/L (ref 22–29)
CREAT SERPL-MCNC: 1.1 MG/DL (ref 0.5–1)
CREAT SERPL-MCNC: 1.3 MG/DL (ref 0.5–1)
CREAT SERPL-MCNC: 1.4 MG/DL (ref 0.5–1)
EKG ATRIAL RATE: 62 BPM
EKG P AXIS: 9 DEGREES
EKG P-R INTERVAL: 128 MS
EKG Q-T INTERVAL: 510 MS
EKG QRS DURATION: 76 MS
EKG QTC CALCULATION (BAZETT): 517 MS
EKG R AXIS: 76 DEGREES
EKG T AXIS: 34 DEGREES
EKG VENTRICULAR RATE: 62 BPM
EOSINOPHILS ABSOLUTE: 0.2 E9/L (ref 0.05–0.5)
EOSINOPHILS RELATIVE PERCENT: 4.5 % (ref 0–6)
GFR SERPL CREATININE-BSD FRML MDRD: 41 ML/MIN/1.73
GFR SERPL CREATININE-BSD FRML MDRD: 45 ML/MIN/1.73
GFR SERPL CREATININE-BSD FRML MDRD: 55 ML/MIN/1.73
GLUCOSE BLD-MCNC: 102 MG/DL (ref 74–99)
GLUCOSE BLD-MCNC: 143 MG/DL (ref 74–99)
GLUCOSE BLD-MCNC: 69 MG/DL (ref 74–99)
HCT VFR BLD CALC: 39.9 % (ref 34–48)
HEMOGLOBIN: 11.8 G/DL (ref 11.5–15.5)
IMMATURE GRANULOCYTES #: 0.01 E9/L
IMMATURE GRANULOCYTES %: 0.2 % (ref 0–5)
LYMPHOCYTES ABSOLUTE: 0.8 E9/L (ref 1.5–4)
LYMPHOCYTES RELATIVE PERCENT: 18.1 % (ref 20–42)
MCH RBC QN AUTO: 25.1 PG (ref 26–35)
MCHC RBC AUTO-ENTMCNC: 29.6 % (ref 32–34.5)
MCV RBC AUTO: 84.7 FL (ref 80–99.9)
MONOCYTES ABSOLUTE: 0.75 E9/L (ref 0.1–0.95)
MONOCYTES RELATIVE PERCENT: 16.9 % (ref 2–12)
NEUTROPHILS ABSOLUTE: 2.65 E9/L (ref 1.8–7.3)
NEUTROPHILS RELATIVE PERCENT: 59.8 % (ref 43–80)
PDW BLD-RTO: 15.3 FL (ref 11.5–15)
PLATELET # BLD: 168 E9/L (ref 130–450)
PMV BLD AUTO: 12.4 FL (ref 7–12)
POTASSIUM REFLEX MAGNESIUM: 4.9 MMOL/L (ref 3.5–5)
POTASSIUM SERPL-SCNC: 3.9 MMOL/L (ref 3.5–5)
POTASSIUM SERPL-SCNC: 4.1 MMOL/L (ref 3.5–5)
RBC # BLD: 4.71 E12/L (ref 3.5–5.5)
SODIUM BLD-SCNC: 143 MMOL/L (ref 132–146)
SODIUM BLD-SCNC: 143 MMOL/L (ref 132–146)
SODIUM BLD-SCNC: 146 MMOL/L (ref 132–146)
TOTAL PROTEIN: 6.2 G/DL (ref 6.4–8.3)
WBC # BLD: 4.4 E9/L (ref 4.5–11.5)

## 2023-03-06 PROCEDURE — 2580000003 HC RX 258: Performed by: FAMILY MEDICINE

## 2023-03-06 PROCEDURE — 6370000000 HC RX 637 (ALT 250 FOR IP): Performed by: STUDENT IN AN ORGANIZED HEALTH CARE EDUCATION/TRAINING PROGRAM

## 2023-03-06 PROCEDURE — 99232 SBSQ HOSP IP/OBS MODERATE 35: CPT | Performed by: FAMILY MEDICINE

## 2023-03-06 PROCEDURE — 85025 COMPLETE CBC W/AUTO DIFF WBC: CPT

## 2023-03-06 PROCEDURE — 94660 CPAP INITIATION&MGMT: CPT

## 2023-03-06 PROCEDURE — 96372 THER/PROPH/DIAG INJ SC/IM: CPT

## 2023-03-06 PROCEDURE — 36415 COLL VENOUS BLD VENIPUNCTURE: CPT

## 2023-03-06 PROCEDURE — 6360000002 HC RX W HCPCS: Performed by: FAMILY MEDICINE

## 2023-03-06 PROCEDURE — G0378 HOSPITAL OBSERVATION PER HR: HCPCS

## 2023-03-06 PROCEDURE — 93010 ELECTROCARDIOGRAM REPORT: CPT | Performed by: INTERNAL MEDICINE

## 2023-03-06 PROCEDURE — 6370000000 HC RX 637 (ALT 250 FOR IP): Performed by: FAMILY MEDICINE

## 2023-03-06 PROCEDURE — 2140000000 HC CCU INTERMEDIATE R&B

## 2023-03-06 PROCEDURE — 80048 BASIC METABOLIC PNL TOTAL CA: CPT

## 2023-03-06 PROCEDURE — 96361 HYDRATE IV INFUSION ADD-ON: CPT

## 2023-03-06 PROCEDURE — 2580000003 HC RX 258

## 2023-03-06 PROCEDURE — 94640 AIRWAY INHALATION TREATMENT: CPT

## 2023-03-06 PROCEDURE — 80053 COMPREHEN METABOLIC PANEL: CPT

## 2023-03-06 PROCEDURE — 2700000000 HC OXYGEN THERAPY PER DAY

## 2023-03-06 RX ORDER — 0.9 % SODIUM CHLORIDE 0.9 %
500 INTRAVENOUS SOLUTION INTRAVENOUS ONCE
Status: COMPLETED | OUTPATIENT
Start: 2023-03-06 | End: 2023-03-06

## 2023-03-06 RX ADMIN — LEVOTHYROXINE SODIUM 75 MCG: 0.07 TABLET ORAL at 08:56

## 2023-03-06 RX ADMIN — MAGNESIUM GLUCONATE 500 MG ORAL TABLET 400 MG: 500 TABLET ORAL at 08:56

## 2023-03-06 RX ADMIN — FUROSEMIDE 40 MG: 40 TABLET ORAL at 08:55

## 2023-03-06 RX ADMIN — OMEGA-3-ACID ETHYL ESTERS 1 G: 1 CAPSULE, LIQUID FILLED ORAL at 21:26

## 2023-03-06 RX ADMIN — OMEGA-3-ACID ETHYL ESTERS 1 G: 1 CAPSULE, LIQUID FILLED ORAL at 09:08

## 2023-03-06 RX ADMIN — DESMOPRESSIN ACETATE 200 MCG: 0.1 TABLET ORAL at 08:56

## 2023-03-06 RX ADMIN — ENOXAPARIN SODIUM 40 MG: 100 INJECTION SUBCUTANEOUS at 08:57

## 2023-03-06 RX ADMIN — SODIUM CHLORIDE 500 ML: 9 INJECTION, SOLUTION INTRAVENOUS at 08:50

## 2023-03-06 RX ADMIN — DESMOPRESSIN ACETATE 200 MCG: 0.1 TABLET ORAL at 21:26

## 2023-03-06 RX ADMIN — POLYETHYLENE GLYCOL 3350 17 G: 17 POWDER, FOR SOLUTION ORAL at 08:57

## 2023-03-06 RX ADMIN — DILTIAZEM HYDROCHLORIDE 30 MG: 30 TABLET, FILM COATED ORAL at 08:55

## 2023-03-06 RX ADMIN — DILTIAZEM HYDROCHLORIDE 30 MG: 30 TABLET, FILM COATED ORAL at 15:34

## 2023-03-06 RX ADMIN — Medication 10 ML: at 21:30

## 2023-03-06 RX ADMIN — Medication 10 ML: at 09:08

## 2023-03-06 RX ADMIN — PREDNISONE 5 MG: 5 TABLET ORAL at 08:56

## 2023-03-06 RX ADMIN — ARFORMOTEROL TARTRATE 15 MCG: 15 SOLUTION RESPIRATORY (INHALATION) at 08:09

## 2023-03-06 RX ADMIN — METOPROLOL SUCCINATE 150 MG: 100 TABLET, EXTENDED RELEASE ORAL at 08:53

## 2023-03-06 RX ADMIN — OXYCODONE AND ACETAMINOPHEN 1 TABLET: 5; 325 TABLET ORAL at 21:29

## 2023-03-06 RX ADMIN — ESCITALOPRAM OXALATE 10 MG: 10 TABLET, FILM COATED ORAL at 08:56

## 2023-03-06 RX ADMIN — HYDRALAZINE HYDROCHLORIDE 25 MG: 25 TABLET, FILM COATED ORAL at 21:26

## 2023-03-06 RX ADMIN — ARFORMOTEROL TARTRATE 15 MCG: 15 SOLUTION RESPIRATORY (INHALATION) at 19:57

## 2023-03-06 RX ADMIN — FAMOTIDINE 20 MG: 20 TABLET, FILM COATED ORAL at 08:56

## 2023-03-06 RX ADMIN — OXYCODONE AND ACETAMINOPHEN 1 TABLET: 5; 325 TABLET ORAL at 08:56

## 2023-03-06 RX ADMIN — OXYCODONE AND ACETAMINOPHEN 1 TABLET: 5; 325 TABLET ORAL at 15:34

## 2023-03-06 RX ADMIN — BUDESONIDE 250 MCG: 0.25 SUSPENSION RESPIRATORY (INHALATION) at 19:57

## 2023-03-06 RX ADMIN — ALBUTEROL SULFATE 2.5 MG: 2.5 SOLUTION RESPIRATORY (INHALATION) at 19:57

## 2023-03-06 RX ADMIN — ALBUTEROL SULFATE 2.5 MG: 2.5 SOLUTION RESPIRATORY (INHALATION) at 11:42

## 2023-03-06 RX ADMIN — DILTIAZEM HYDROCHLORIDE 30 MG: 30 TABLET, FILM COATED ORAL at 21:26

## 2023-03-06 RX ADMIN — BUDESONIDE 250 MCG: 0.25 SUSPENSION RESPIRATORY (INHALATION) at 08:09

## 2023-03-06 RX ADMIN — HYDRALAZINE HYDROCHLORIDE 25 MG: 25 TABLET, FILM COATED ORAL at 08:56

## 2023-03-06 RX ADMIN — SENNOSIDES AND DOCUSATE SODIUM 2 TABLET: 50; 8.6 TABLET ORAL at 08:55

## 2023-03-06 ASSESSMENT — PAIN DESCRIPTION - LOCATION: LOCATION: ABDOMEN

## 2023-03-06 ASSESSMENT — PAIN SCALES - GENERAL
PAINLEVEL_OUTOF10: 7
PAINLEVEL_OUTOF10: 5
PAINLEVEL_OUTOF10: 7
PAINLEVEL_OUTOF10: 8

## 2023-03-06 ASSESSMENT — PAIN DESCRIPTION - ORIENTATION: ORIENTATION: MID

## 2023-03-06 NOTE — PROGRESS NOTES
Date: 3/5/2023    Time: 9:41 PM    Patient Placed On BIPAP/CPAP/ Non-Invasive Ventilation? Yes    If no must comment. Facial area red/color change? No           If YES are Blister/Lesion present? No   If yes must notify nursing staff  BIPAP/CPAP skin barrier?   Yes    Skin barrier type:mepilexlite       Comments:        Claudean Schmidt, RCP

## 2023-03-06 NOTE — PROGRESS NOTES
[facility] In[facility]   Resident Progress Note    S:  Hospital day: 1   Brief Synopsis: Bobby Roberts is a 78 y.o. female with a PMHx of HTN, HLD, Afib not on anticoagulation and COPD on 6L chronically who presented with abdominal pain and mental status changes. Overnight/interim:   No overnight events. Patient seen and evaluated at bedside this AM. She states that she had 1 large and soft bowel movement early this morning. Denies: Fevers, chest pain or shortness of breath. Cont meds:    sodium chloride       Scheduled meds:    desmopressin  200 mcg Oral BID    dilTIAZem  30 mg Oral TID    escitalopram  10 mg Oral Daily    famotidine  20 mg Oral Daily    levothyroxine  75 mcg Oral Daily    metoprolol succinate  150 mg Oral Daily    omega-3 acid ethyl esters  1 g Oral BID    predniSONE  5 mg Oral Daily    sodium chloride flush  5-40 mL IntraVENous 2 times per day    enoxaparin  40 mg SubCUTAneous Daily    polyethylene glycol  17 g Oral Daily    sennosides-docusate sodium  2 tablet Oral Daily    budesonide  0.25 mg Nebulization BID    And    arformoterol tartrate  15 mcg Nebulization BID    furosemide  40 mg Oral Daily    hydrALAZINE  25 mg Oral BID    magnesium oxide  400 mg Oral Daily     PRN meds: albuterol, oxyCODONE-acetaminophen, sodium chloride flush, sodium chloride, ondansetron **OR** ondansetron, acetaminophen **OR** acetaminophen     I reviewed the patient's past medical and surgical history, Medications and Allergies. O:  BP (!) 149/99   Pulse 58   Temp 98.7 °F (37.1 °C) (Axillary)   Resp 18   Ht 5' (1.524 m)   Wt 165 lb (74.8 kg)   LMP  (LMP Unknown)   SpO2 94%   BMI 32.22 kg/m²   24 hour I&O: I/O last 3 completed shifts: In: 10 [I.V.:10]  Out: 1000 [Urine:1000]  No intake/output data recorded. Physical Exam  Vitals reviewed. Constitutional:       General: She is not in acute distress. Appearance: She is not ill-appearing or diaphoretic. Interventions: Face mask in place. Cardiovascular:      Rate and Rhythm: Normal rate. Rhythm irregularly irregular. Pulmonary:      Effort: Pulmonary effort is normal.      Breath sounds: Normal breath sounds. Abdominal:      General: Bowel sounds are normal. There is no distension. Palpations: Abdomen is soft. Tenderness: There is no abdominal tenderness. Musculoskeletal:      Right lower leg: No edema. Left lower leg: No edema. Psychiatric:         Behavior: Behavior is cooperative. Labs:  Na/K/Cl/CO2:  143/4.9/98/37 (03/06 0518)  BUN/Cr/glu/ALT/AST/amyl/lip:  30/1.4/--/18/25/--/-- (03/06 0518)  WBC/Hgb/Hct/Plts:  4.4/11.8/39.9/168 (03/06 0518)  estimated creatinine clearance is 36 mL/min (A) (based on SCr of 1.4 mg/dL (H)). Other pertinent labs as noted below    Radiology:  CT ABDOMEN PELVIS W IV CONTRAST Additional Contrast? None   Final Result   1. Large hiatal hernia containing small and large bowel loops, though without   ileus or obstruction. 2. No acute inflammatory process. 3. Chronic small airways disease within the lung bases, with patchy   atelectatic change. 4. Small hiatal hernia. 5. Cardiomegaly. 6. Other similar nonacute findings as above. XR CHEST PORTABLE   Final Result   Cardiomegaly with pulmonary edema and possibly small pleural effusions   suggestive of congestive heart failure acute exacerbation. Resident Assessment and Plan     Abdominal pain  - Likely due to constipation vs large abdominal wall hernia  - CAT scan demonstrates significant stool burden  - Senna S and MiraLAX daily                 Hx of COPD  - Not in acute exacerbation at this time   -Continue nocturnal noninvasive ventilation    IRMA  -Likely due to decreased po intake/IV Lasix given in ED and contrast from imaging   -500cc NS bolus  -Monitor BMP  -May consider holding home Lasix vs decreasing home Lasix.  Patient states she takes Lasix every other day at home.     Hypokalemia/Hypomagnesemia  -Replete as appropriate    History of CHF  - Not in acute exacerbation at this time  -Continue home Lasix. May consider holding pending BMP today                 Hx of A-fib  - Continue rate control with Lopressor  - Not on anticoagulation                History of diabetes insipidus  - Home desmopressin                History of Chronic pain with chronic opioid use  - Percocet every 6-8 hours as needed. Given that there was some concern for change in mental status. Hold for now. PT/OT evaluation: Not indicated at this time.    DVT prophylaxis: Lovenox   GI prophylaxis: Protonix   Disposition: May consider discharge today pending BMP  Diet: Adult diet        Electronically signed by Ami López DO on 3/6/2023 at 12:44 PM  Attending physician: Dr. Chase Asp

## 2023-03-06 NOTE — CARE COORDINATION
Care Coordination  The patient was admitted from home for complaint of abdominal pain and diarrhea. Per the  she was also confused kept him awake for 2 nights. On admission the patient was alert and oriented x 4.. her labs on admission bnp was 2400, troponin 24, Bun /creat 25/1.2. The patient was started Iv lasix, Abgs done shows her Pc02 of 57 likely due to chronic co2 retention. She does not use Niv overnight. Ct of the abd shows a large hiatal hernia containing large and small bowel loops without obstruction. Chest shows cardiomegaly with pulmonary edema. She is on 4 liters n/c currently with bipap at  with fio2 of 50 %. Per the patient she lives with her  in a ranch home with a ramped entry. Pt has home care aide 2 x's a week for a few hours through [facility]. Dme is a hospital bed,bsc,shower chair,ww,wc,electric scooter and she is on 6 liters n/c at home through [facility] as well as a Cpap at home. She has a history of skilled care at [facility] in the past. She is currently active with [facility]  and will need resumption of care orders upon discharge. Her plan is to return home once she is medically stable. Her ride home is her . Case Management Assessment  Initial Evaluation    Date/Time of Evaluation: 3/6/2023 1:03 PM  Assessment Completed by: Vivek Dang RN    If patient is discharged prior to next notation, then this note serves as note for discharge by case management.     Patient Name: Rhea Jones                   YOB: 1955  Diagnosis: Ventral hernia without obstruction or gangrene [K43.9]  Acute decompensated heart failure ([facility] [I50.9]  Abdominal pain, unspecified abdominal location [R10.9]  Fatigue, unspecified type [R53.83]  Acute on chronic congestive heart failure, unspecified heart failure type [facility]) [I50.9]                   Date / Time: 3/4/2023  9:03 PM    Patient Admission Status: Inpatient   Readmission Risk (Low < 19, Mod (19-27), High > 27): Readmission Risk Score: 26.3    Current PCP: Zulema Sosa MD  PCP verified by CM? Yes    Chart Reviewed: Yes      History Provided by: Patient  Patient Orientation: Alert and Oriented    Patient Cognition: Alert    Hospitalization in the last 30 days (Readmission):  No    If yes, Readmission Assessment in  Navigator will be completed. Advance Directives:      Code Status: Full Code   Patient's Primary Decision Maker is: Legal Next of Kin    Primary Decision Maker: Beti [facility] [phone]    Secondary Decision Maker: Kasey Hoskins [facility] [phone]    Discharge Planning:    Patient lives with: Spouse/Significant Other Type of Home: House  Primary Care Giver: Spouse  Patient Support Systems include: Spouse/Significant Other   Current Financial resources:    Current community resources:    Current services prior to admission: Home Care            Current DME:              Type of Home Care services:  Aide Services    ADLS  Prior functional level: Assistance with the following:, Bathing  Current functional level: Assistance with the following:, Bathing    PT AM-PAC:   /24  OT AM-PAC:   /24    Family can provide assistance at DC: Yes  Would you like Case Management to discuss the discharge plan with any other family members/significant others, and if so, who?  Yes  Plans to Return to Present Housing: Yes  Other Identified Issues/Barriers to RETURNING to current housing: yes  Potential Assistance needed at discharge: Home Care            Potential DME:    Patient expects to discharge to: [address] [place] [address] for transportation at discharge:      Financial    Payor: Maxime Bejarano / Plan: Reg Espinal / Product Type: *No Product type* /     Does insurance require precert for SNF: Yes    Potential assistance Purchasing Medications:    Meds-to-Beds request: Yes      Tasha Mason, [address] [place] [address] - [address] [phone]  [address] [address]  Phone: [phone] Fax: [phone]      Notes:    Factors facilitating achievement of predicted outcomes: Family support    Barriers to discharge: unknown at this time    Additional Case Management Notes: see cm note    The Plan for Transition of Care is related to the following treatment goals of Ventral hernia without obstruction or gangrene [K43.9]  Acute decompensated heart failure ([address] [I50.9]  Abdominal pain, unspecified abdominal location [R10.9]  Fatigue, unspecified type [R53.83]  Acute on chronic congestive heart failure, unspecified heart failure type ([address] [G17.3]    IF APPLICABLE: The Patient and/or patient representative Filomena Pettit and her family were provided with a choice of provider and agrees with the discharge plan. Freedom of choice list with basic dialogue that supports the patient's individualized plan of care/goals and shares the quality data associated with the providers was provided to:     Patient Representative Name:       The Patient and/or Patient Representative Agree with the Discharge Plan?       Gary Joe RN  Case Management Department  Ph: [phone]

## 2023-03-06 NOTE — PROGRESS NOTES
Attempted to ambulate pt at this time per order. Pt refused. Encouragement provided for pt to get in to chair for a little bit pt refused. Will attempt again later this shift.

## 2023-03-06 NOTE — PROGRESS NOTES
[facility] Attending    S: 78 y.o. female with a history of afib (off of anticoagulation due to gi bleeding), HFpEF, chronic hypoxic respiratory failure on 4-6L NC, COPD, DI, cdiff, htn, hypothyroidism, VAIN III, ischemic colitis, severe pulmonary hypertension, sarcoidosis, ventral hernia, fibromyalgia, LINDSAY and avn on chronic opioid therapy who presented for concerns of abdominal pain. Patient reports last bm was yesterday but it has been hard and usually has a bm several times per day. Pt reported that she had been taking her lasix every other day as it caused her to urinate more frequently. She was recently admitted on 2/4 and discharged on 2/8 with hypoxic respiratory failure due to chf/copd exacerbation with use of NIV. This morning, has some ongoing abdominal discomfort, did have a large soft bm.     O: VS- Blood pressure (!) 149/99, pulse 58, temperature 98.7 °F (37.1 °C), temperature source Axillary, resp. rate 18, height 5' (1.524 m), weight 165 lb (74.8 kg), SpO2 94 %, not currently breastfeeding. Exam is as noted by resident with the following changes, additions or corrections:  Gen: NAD on 3L NC oxygen  HEENT: NCAT, PERRL, MMM  Neck: supple  CV-RRR  Lungs-diminished bs b/l  ABD-soft . Large vental hernia palpated with reduction, minimal right sided ttp  Ext-no C/C; tr edema b/l lower legs      Impressions:   Principal Problem:  Abdominal pain  Resolved Problems:    * No resolved hospital problems. *      Plan:   with some chronic abdominal discomfort likely related to ongoing chronic ventral hernia and constipation. Continue home pain meds and increase bowel regimen. CHF-appears clinically to be close to her baseline, bnp is not increased. She now has an elevated creatinine which may be related to exposure to CT dye, diuretics, and eating/drinking less. Will try to gently rehydrate. HTN-better on on the increased dose of hydralazine.      Replete potassium and magnesium and follow levels. Increase activity/up to chair. Disposition planning for discharge to home tomorrow. Attending Physician Statement  I have reviewed the chart and seen the patient with the resident(s). I personally reviewed images, EKG's and similar tests, if present. I personally reviewed and performed key elements of the history and exam.  I have reviewed and confirmed student and/or resident history and exam with changes as indicated above. I agree with the assessment, plan and orders as documented by the resident. Please refer to the resident and/or student note for additional information.       Jay Boogie MD

## 2023-03-06 NOTE — PROGRESS NOTES
Physician Progress Note      PATIENT:               Katie Millard  CSN #:                  [id number]  :                       1955  ADMIT DATE:       3/4/2023 9:03 PM  [address] [place] DATE:  Karl Okeefe  PROVIDER #:        Swati White MD          QUERY TEXT:    Pt admitted with abdominal pain and has CHF documented. If possible, please   document in progress notes and discharge summary further specificity regarding   the type and acuity of CHF:    The medical record reflects the following:  Risk Factors: HFpEF, COPD, HTN, chronic ventral hernia  Clinical Indicators: Per ED provider note: Acute on chronic congestive heart   failure. Per H&P: History of CHF- Not in acute exacerbation at this time,   resume oral diuretics. Per  progress note:  Acute decompensated heart   failure. Kaylee Wheatley CHF-appears clinically to be close to her baseline, bnp is not   increased. 3/5 BNP: 2420, Troponin: 24, 18.   Treatment: 1 dose IV Lasix in ED, PO Lasix, I&Os    Thank you,  Shaheed Ann RN [facility]  [phone]  Options provided:  -- Acute on Chronic Diastolic CHF/HFpEF  -- Chronic Diastolic CHF/HFpEF  -- Other - I will add my own diagnosis  -- Disagree - Not applicable / Not valid  -- Disagree - Clinically unable to determine / Unknown  -- Refer to Clinical Documentation Reviewer    PROVIDER RESPONSE TEXT:    Abdominal pain related to chronic ventral hernia    Query created by: Edita Hurtado on 3/6/2023 11:47 AM      Electronically signed by:  Swati White MD 3/6/2023 6:01 PM

## 2023-03-07 ENCOUNTER — HOSPITAL ENCOUNTER (OUTPATIENT)
Dept: OTHER | Age: 68
Setting detail: THERAPIES SERIES
Discharge: HOME | End: 2023-03-07

## 2023-03-07 VITALS
HEART RATE: 71 BPM | DIASTOLIC BLOOD PRESSURE: 92 MMHG | TEMPERATURE: 97.9 F | HEIGHT: 60 IN | BODY MASS INDEX: 32.39 KG/M2 | RESPIRATION RATE: 18 BRPM | SYSTOLIC BLOOD PRESSURE: 170 MMHG | WEIGHT: 165 LBS | OXYGEN SATURATION: 96 %

## 2023-03-07 PROBLEM — K59.00 CONSTIPATION: Status: ACTIVE | Noted: 2023-03-07

## 2023-03-07 PROBLEM — R10.9 ABDOMINAL PAIN: Status: ACTIVE | Noted: 2023-03-07

## 2023-03-07 PROBLEM — N39.0 URINARY TRACT INFECTION: Status: ACTIVE | Noted: 2023-03-07

## 2023-03-07 LAB
ALBUMIN SERPL-MCNC: 3.4 G/DL (ref 3.5–5.2)
ALP BLD-CCNC: 72 U/L (ref 35–104)
ALT SERPL-CCNC: 15 U/L (ref 0–32)
ANION GAP SERPL CALCULATED.3IONS-SCNC: 5 MMOL/L (ref 7–16)
AST SERPL-CCNC: 20 U/L (ref 0–31)
BASOPHILS ABSOLUTE: 0.02 E9/L (ref 0–0.2)
BASOPHILS RELATIVE PERCENT: 0.5 % (ref 0–2)
BILIRUB SERPL-MCNC: 0.2 MG/DL (ref 0–1.2)
BUN BLDV-MCNC: 25 MG/DL (ref 6–23)
CALCIUM SERPL-MCNC: 8.9 MG/DL (ref 8.6–10.2)
CHLORIDE BLD-SCNC: 100 MMOL/L (ref 98–107)
CO2: 36 MMOL/L (ref 22–29)
CREAT SERPL-MCNC: 1.1 MG/DL (ref 0.5–1)
EOSINOPHILS ABSOLUTE: 0.2 E9/L (ref 0.05–0.5)
EOSINOPHILS RELATIVE PERCENT: 5.3 % (ref 0–6)
GFR SERPL CREATININE-BSD FRML MDRD: 55 ML/MIN/1.73
GLUCOSE BLD-MCNC: 77 MG/DL (ref 74–99)
HCT VFR BLD CALC: 38.2 % (ref 34–48)
HEMOGLOBIN: 11.7 G/DL (ref 11.5–15.5)
IMMATURE GRANULOCYTES #: 0.02 E9/L
IMMATURE GRANULOCYTES %: 0.5 % (ref 0–5)
LYMPHOCYTES ABSOLUTE: 0.61 E9/L (ref 1.5–4)
LYMPHOCYTES RELATIVE PERCENT: 16.2 % (ref 20–42)
MCH RBC QN AUTO: 25.3 PG (ref 26–35)
MCHC RBC AUTO-ENTMCNC: 30.6 % (ref 32–34.5)
MCV RBC AUTO: 82.7 FL (ref 80–99.9)
MONOCYTES ABSOLUTE: 0.62 E9/L (ref 0.1–0.95)
MONOCYTES RELATIVE PERCENT: 16.5 % (ref 2–12)
NEUTROPHILS ABSOLUTE: 2.29 E9/L (ref 1.8–7.3)
NEUTROPHILS RELATIVE PERCENT: 61 % (ref 43–80)
PDW BLD-RTO: 15.6 FL (ref 11.5–15)
PLATELET # BLD: 153 E9/L (ref 130–450)
PMV BLD AUTO: 11.6 FL (ref 7–12)
POTASSIUM REFLEX MAGNESIUM: 4 MMOL/L (ref 3.5–5)
RBC # BLD: 4.62 E12/L (ref 3.5–5.5)
SODIUM BLD-SCNC: 141 MMOL/L (ref 132–146)
TOTAL PROTEIN: 6 G/DL (ref 6.4–8.3)
WBC # BLD: 3.8 E9/L (ref 4.5–11.5)

## 2023-03-07 PROCEDURE — G0378 HOSPITAL OBSERVATION PER HR: HCPCS

## 2023-03-07 PROCEDURE — 36415 COLL VENOUS BLD VENIPUNCTURE: CPT

## 2023-03-07 PROCEDURE — 97165 OT EVAL LOW COMPLEX 30 MIN: CPT

## 2023-03-07 PROCEDURE — 96372 THER/PROPH/DIAG INJ SC/IM: CPT

## 2023-03-07 PROCEDURE — 97535 SELF CARE MNGMENT TRAINING: CPT

## 2023-03-07 PROCEDURE — 94660 CPAP INITIATION&MGMT: CPT

## 2023-03-07 PROCEDURE — 6370000000 HC RX 637 (ALT 250 FOR IP): Performed by: STUDENT IN AN ORGANIZED HEALTH CARE EDUCATION/TRAINING PROGRAM

## 2023-03-07 PROCEDURE — 6370000000 HC RX 637 (ALT 250 FOR IP): Performed by: FAMILY MEDICINE

## 2023-03-07 PROCEDURE — 2580000003 HC RX 258: Performed by: FAMILY MEDICINE

## 2023-03-07 PROCEDURE — 97530 THERAPEUTIC ACTIVITIES: CPT

## 2023-03-07 PROCEDURE — 85025 COMPLETE CBC W/AUTO DIFF WBC: CPT

## 2023-03-07 PROCEDURE — 6360000002 HC RX W HCPCS: Performed by: FAMILY MEDICINE

## 2023-03-07 PROCEDURE — 97161 PT EVAL LOW COMPLEX 20 MIN: CPT

## 2023-03-07 PROCEDURE — 94640 AIRWAY INHALATION TREATMENT: CPT

## 2023-03-07 PROCEDURE — 80053 COMPREHEN METABOLIC PANEL: CPT

## 2023-03-07 PROCEDURE — 99238 HOSP IP/OBS DSCHRG MGMT 30/<: CPT | Performed by: FAMILY MEDICINE

## 2023-03-07 RX ORDER — SULFAMETHOXAZOLE AND TRIMETHOPRIM 800; 160 MG/1; MG/1
1 TABLET ORAL 2 TIMES DAILY
Qty: 6 TABLET | Refills: 0 | Status: SHIPPED | OUTPATIENT
Start: 2023-03-07 | End: 2023-03-10

## 2023-03-07 RX ADMIN — MAGNESIUM GLUCONATE 500 MG ORAL TABLET 400 MG: 500 TABLET ORAL at 08:57

## 2023-03-07 RX ADMIN — ESCITALOPRAM OXALATE 10 MG: 10 TABLET, FILM COATED ORAL at 08:58

## 2023-03-07 RX ADMIN — DESMOPRESSIN ACETATE 200 MCG: 0.1 TABLET ORAL at 08:57

## 2023-03-07 RX ADMIN — FAMOTIDINE 20 MG: 20 TABLET, FILM COATED ORAL at 08:57

## 2023-03-07 RX ADMIN — HYDRALAZINE HYDROCHLORIDE 25 MG: 25 TABLET, FILM COATED ORAL at 08:58

## 2023-03-07 RX ADMIN — SENNOSIDES AND DOCUSATE SODIUM 2 TABLET: 50; 8.6 TABLET ORAL at 08:58

## 2023-03-07 RX ADMIN — OXYCODONE AND ACETAMINOPHEN 1 TABLET: 5; 325 TABLET ORAL at 03:31

## 2023-03-07 RX ADMIN — OMEGA-3-ACID ETHYL ESTERS 1 G: 1 CAPSULE, LIQUID FILLED ORAL at 08:57

## 2023-03-07 RX ADMIN — METOPROLOL SUCCINATE 150 MG: 100 TABLET, EXTENDED RELEASE ORAL at 08:58

## 2023-03-07 RX ADMIN — Medication 10 ML: at 10:32

## 2023-03-07 RX ADMIN — ARFORMOTEROL TARTRATE 15 MCG: 15 SOLUTION RESPIRATORY (INHALATION) at 09:03

## 2023-03-07 RX ADMIN — BUDESONIDE 250 MCG: 0.25 SUSPENSION RESPIRATORY (INHALATION) at 09:04

## 2023-03-07 RX ADMIN — DILTIAZEM HYDROCHLORIDE 30 MG: 30 TABLET, FILM COATED ORAL at 08:57

## 2023-03-07 RX ADMIN — ENOXAPARIN SODIUM 40 MG: 100 INJECTION SUBCUTANEOUS at 08:58

## 2023-03-07 RX ADMIN — POLYETHYLENE GLYCOL 3350 17 G: 17 POWDER, FOR SOLUTION ORAL at 08:58

## 2023-03-07 RX ADMIN — LEVOTHYROXINE SODIUM 75 MCG: 0.07 TABLET ORAL at 05:19

## 2023-03-07 RX ADMIN — FUROSEMIDE 40 MG: 40 TABLET ORAL at 08:58

## 2023-03-07 RX ADMIN — OXYCODONE AND ACETAMINOPHEN 1 TABLET: 5; 325 TABLET ORAL at 13:33

## 2023-03-07 RX ADMIN — DILTIAZEM HYDROCHLORIDE 30 MG: 30 TABLET, FILM COATED ORAL at 13:33

## 2023-03-07 RX ADMIN — PREDNISONE 5 MG: 5 TABLET ORAL at 08:58

## 2023-03-07 ASSESSMENT — PAIN SCALES - GENERAL
PAINLEVEL_OUTOF10: 8
PAINLEVEL_OUTOF10: 8

## 2023-03-07 ASSESSMENT — PAIN DESCRIPTION - LOCATION: LOCATION: ABDOMEN

## 2023-03-07 NOTE — DISCHARGE SUMMARY
Discharge Summary    Chaim Manzanares  :  1955  MRN:  [id number]    Admit date:  3/4/2023  Discharge date:      Admitting Physician:  Sonia Villagomez MD    Discharge Diagnoses:    Patient Active Problem List   Diagnosis    Chronic back pain    Primary hypothyroidism    Nephrosclerosis    Diabetes insipidus, neurohypophyseal ([facility]    Sarcoidosis    Steroid-induced avascular necrosis of shoulder ([facility]    Anemia due to chronic illness    Chronic pain syndrome    Bilateral hip pain    Debility    BRBPR (bright red blood per rectum)    Hypertensive pulmonary vascular disease    Benign essential hypertension    Chronic kidney disease, stage III (moderate) (HCC)    PAF (paroxysmal atrial fibrillation) ([facility] currently in NSR    Mixed hyperlipidemia    Chronic combined systolic and diastolic heart failure (HCC)    Chronic respiratory failure with hypoxia (HCC)    Mixed simple and mucopurulent chronic bronchitis (HCC)    Recurrent major depressive disorder, in partial remission (HCC)    Gait disturbance    Chronic, continuous use of opioids    PMB (postmenopausal bleeding)    Severe dysplasia of vagina    Epistaxis    Ventral hernia without obstruction or gangrene    Long term (current) use of systemic steroids    Confusion    Acute combined systolic and diastolic CHF, NYHA class 1 (HCC)    Bilateral pleural effusion    Nuclear senile cataract    Acute decompensated heart failure ([facility]       Admission Condition:  fair    Discharged Condition:  good    Hospital Course:  Chaim Manzanares is a 78 y.o. female with a PMHx of HTN, HLD, Afib not on anticoagulation, chronic opioid use, Sarcoidosis and COPD on 6L chronically who presented with abdominal pain and mental status changes. Abdominal pain began 3 days prior to ED presentation. Pain located in lower abdomen. Patient last had bowel movement 3 days prior to presentation. She had taken Colace intermittently at home but did not alleviate symptoms.  Additionally, patient stated she felt confused for 2 days.  did not have complaint about altered mental status. Patient later denied being altered. Denied systemic symptoms. ED workup revealed: Creatinine 1.2, glucose 74, troponins 24 -> 18, ABG with PCO2 57 (Likely chronic), BNP 2400 (Lower than baseline), CXR showing cardiomegaly with pulmonary edema & CT abdomen and pelvis demonstrated a large hiatal hernia containing loops of bowel without ileus or obstruction, a small hiatal hernia, cardiomegaly and chronic small airway disease. Patient was given 20mg IV Lasix. She was admitted for further evaluation of abdominal pain. Patient was placed on Senokot and Glycolax. On day 2 of hospitalization, patient had 1 large soft bowel movement. Creatinine was noted to be elevated at 1.4 therefore patient given NS bolus. Creatinine likely elevated due to decreased po intake, IV Lasix and contrast from imaging. On day of discharge, creatinine improved at 1.1. Urine culture grew gram negative rods. Due to abdominal pain and cultures, decision was made to place patient on antibiotic. Abdominal pain likely worsened by chronic opioid use. Patient request codeine on multiple occasions for complaint of cough. She was offered alternative medications for cough but refused. Of note, patient not witnessed coughing during this hospital stay. Patient remained stable, recovered and was in a suitable condition to be discharged home. Discharge plan:   Patient sent home on 3 day course of Bactrim  Encouraged to follow-up with PCP. Need clarification if patient is to remain on Eliquis  Patient will need outpatient follow-up for large abdominal wall hernia.        Discharge Medications:         Medication List        ASK your doctor about these medications      * albuterol (2.5 MG/3ML) 0.083% nebulizer solution  Commonly known as: PROVENTIL  Take 3 mLs by nebulization every 6 hours as needed for Wheezing or Shortness of Breath     * albuterol sulfate  (90 Base) MCG/ACT inhaler  Commonly known as: Proventil HFA  Inhale 2 puffs into the lungs every 6 hours as needed for Wheezing or Shortness of Breath     apixaban 5 MG Tabs tablet  Commonly known as: ELIQUIS     Breo Ellipta 100-25 MCG/ACT inhaler  Generic drug: fluticasone furoate-vilanterol  Inhale 1 puff into the lungs daily     cycloSPORINE 0.05 % ophthalmic emulsion  Commonly known as: Restasis  Place 1 drop into both eyes every 12 hours     desmopressin 0.1 MG tablet  Commonly known as: DDAVP  Take 2 tablets by mouth 2 times daily     dilTIAZem 30 MG tablet  Commonly known as: CARDIZEM  Take 1 tablet by mouth in the morning and 1 tablet at noon and 1 tablet before bedtime. docusate sodium 100 MG capsule  Commonly known as: Colace  Take 1 capsule by mouth 2 times daily     escitalopram 10 MG tablet  Commonly known as: LEXAPRO  Take 1 tablet by mouth daily     famotidine 20 MG tablet  Commonly known as: PEPCID  Take 1 tablet by mouth daily     ferrous sulfate 325 (65 Fe) MG tablet  Commonly known as: IRON 325  Take 1 tablet by mouth 2 times daily     furosemide 40 MG tablet  Commonly known as: LASIX  Take 1 tablet by mouth daily     hydrALAZINE 10 MG tablet  Commonly known as: APRESOLINE  Take 1 tablet by mouth in the morning and at bedtime     levothyroxine 75 MCG tablet  Commonly known as: SYNTHROID  Take 1 tablet by mouth daily     metoprolol succinate 50 MG extended release tablet  Commonly known as: TOPROL XL  Take 3 tablets by mouth daily     naloxone 4 MG/0.1ML Liqd nasal spray  Commonly known as: Narcan  1 spray by Nasal route as needed for Opioid Reversal     omega-3 acid ethyl esters 1 g capsule  Commonly known as: LOVAZA  Take 1 capsule by mouth in the morning and 1 capsule before bedtime. oxyCODONE-acetaminophen 5-325 MG per tablet  Commonly known as: PERCOCET  Take 1 tablet by mouth every 6 hours as needed for Pain for up to 31 days.  Max Daily Amount: 4 tablets OXYGEN  Inhale 6 L/min into the lungs as needed (shortness of breath, low oxygen <90%) BMS     potassium chloride 20 MEQ Tbcr extended release tablet  Commonly known as: KLOR-CON M     predniSONE 5 MG tablet  Commonly known as: DELTASONE  Take 1 tablet by mouth daily     promethazine-codeine 6.25-10 MG/5ML syrup  Commonly known as: PHENERGAN with CODEINE  Take 5 mLs by mouth 4 times daily as needed for Cough for up to 60 days. sodium chloride 0.65 % nasal spray  Commonly known as: OCEAN           * This list has 2 medication(s) that are the same as other medications prescribed for you. Read the directions carefully, and ask your doctor or other care provider to review them with you. Consults:  none    Significant Diagnostic Studies:  See below    Labs:  Na/K/Cl/CO2:  141/4.0/100/36 (03/07 3525)  BUN/Cr/glu/ALT/AST/amyl/lip:  25/1.1/--/15/20/--/-- (03/07 9014)  WBC/Hgb/Hct/Plts:  3.8/11.7/38.2/153 (03/07 3953)  [unfilled]  estimated creatinine clearance is 45 mL/min (A) (based on SCr of 1.1 mg/dL (H)). New Imaging:  CT ABDOMEN PELVIS W IV CONTRAST Additional Contrast? None   Final Result   1. Large hiatal hernia containing small and large bowel loops, though without   ileus or obstruction. 2. No acute inflammatory process. 3. Chronic small airways disease within the lung bases, with patchy   atelectatic change. 4. Small hiatal hernia. 5. Cardiomegaly. 6. Other similar nonacute findings as above. XR CHEST PORTABLE   Final Result   Cardiomegaly with pulmonary edema and possibly small pleural effusions   suggestive of congestive heart failure acute exacerbation.                Treatments:   See above     Discharge Exam:  General Appearance: alert and oriented to person, place and time, well developed and well- nourished, in no acute distress  Skin: warm and dry, no rash or erythema  Head: normocephalic and atraumatic  Neck: supple and non-tender without mass, no thyromegaly or thyroid nodules, no cervical lymphadenopathy  Pulmonary/Chest: clear to auscultation bilaterally- no wheezes, rales or rhonchi, normal air movement, no respiratory distress  Cardiovascular: Irregularly irregular rhythm, normal S1 and S2, no murmurs, rubs, clicks, or gallops, distal pulses intact, no carotid bruits  Abdomen: soft, mild suprapubic tenderness, abdominal wall hernia compressible; non-distended, normal bowel sounds, no masses or organomegaly  Extremities: no cyanosis or edema      Disposition:   home    Future Appointments   Date Time Provider Mauri Sanford   3/13/2023  3:00 PM Gaither Hunt, MD [facility]   3/20/2023  1:00 PM [facility] ROOM 1 [facility]       More than 30 minutes was spent in preparation of this patient's discharge including, but not limited to, examination, preparation of documents, prescription preparation, counseling and coordination.     Sid Galindo DO  3/7/2023, 9:56 AM

## 2023-03-07 NOTE — PLAN OF CARE
Problem: Chronic Conditions and Co-morbidities  Goal: Patient's chronic conditions and co-morbidity symptoms are monitored and maintained or improved  Outcome: Adequate for Discharge     Problem: Discharge Planning  Goal: Discharge to home or other facility with appropriate resources  Outcome: Adequate for Discharge     Problem: Pain  Goal: Verbalizes/displays adequate comfort level or baseline comfort level  Outcome: Adequate for Discharge     Problem: Safety - Adult  Goal: Free from fall injury  Outcome: Adequate for Discharge     Problem: ABCDS Injury Assessment  Goal: Absence of physical injury  Outcome: Adequate for Discharge     Problem: Skin/Tissue Integrity  Goal: Absence of new skin breakdown  Description: 1. Monitor for areas of redness and/or skin breakdown  2. Assess vascular access sites hourly  3. Every 4-6 hours minimum:  Change oxygen saturation probe site  4. Every 4-6 hours:  If on nasal continuous positive airway pressure, respiratory therapy assess nares and determine need for appliance change or resting period.   Outcome: Adequate for Discharge     Problem: Cardiovascular - Adult  Goal: Maintains optimal cardiac output and hemodynamic stability  Outcome: Adequate for Discharge     Problem: Metabolic/Fluid and Electrolytes - Adult  Goal: Hemodynamic stability and optimal renal function maintained  Outcome: Adequate for Discharge

## 2023-03-07 NOTE — PROGRESS NOTES
Date: 3/6/2023    Time: 10:09 PM    Patient Placed On BIPAP/CPAP/ Non-Invasive Ventilation? Yes    If no must comment. Facial area red/color change? No           If YES are Blister/Lesion present? No   If yes must notify nursing staff  BIPAP/CPAP skin barrier?   Yes    Skin barrier type:mepilexlite       Comments:     03/06/23 2201   NIV Type   Skin Assessment Clean, dry, & intact   Skin Protection for O2 Device Yes   Orientation Middle   Location Nose   Intervention(s) Skin Barrier   NIV Started/Stopped On   Equipment Type v60   Mode CPAP   Mask Type Full face mask   Mask Size Small   Settings/Measurements   PIP Observed 12 cm H20   CPAP/EPAP 12 cmH2O   Vt (Measured) 481 mL   Resp 13   FiO2  50 %   Minute Volume (L/min) 6.7 Liters   Mask Leak (lpm) 40 lpm   Comfort Level Good   Using Accessory Muscles No   SpO2 98   Patient's Home Machine No   Alarm Settings   Alarms On Y   Low Pressure (cmH2O) 8 cmH2O   High Pressure (cmH2O) 30 cmH2O   Apnea (secs) 20 secs   RR Low (bpm) 8   RR High (bpm) 45 br/min   Patient Observation   Observations red outlet, red cord in wall alarm         Nayeli Eldridge RCP

## 2023-03-07 NOTE — PROGRESS NOTES
Occupational Therapy  OCCUPATIONAL THERAPY INITIAL EVALUATION    [address] [address] [address]      Date:3/7/2023                                                Patient Name: Merline Goddadr  MRN: [id number]  : 1955  Room: [address] [place] [address]    Evaluating OT: Narciso Carr OTR/L #[id number]     Referring Provider: Nigel Johnson MD  Specific Provider Orders/Date: OT eval and treat 3/6/23    Diagnosis: Ventral hernia without obstruction or gangrene [K43.9]  Acute decompensated heart failure ([address] [I50.9]  Abdominal pain, unspecified abdominal location [R10.9]  Fatigue, unspecified type [R53.83]  Acute on chronic congestive heart failure, unspecified heart failure type ([address] [I50.9]   Pt admitted to hospital with abdominal pain and AMS     Pertinent Medical History:  has a past medical history of A-fib ([address] Able to transfer from chair to wheelchair, Abnormal mammogram, Acute on chronic respiratory failure ([address] Anemia, Anemia due to chronic illness, Ankle fracture, left, 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[address] Backache, Benign hypertension, CHF (congestive heart failure) ([address] Chronic back pain, Chronic kidney disease, Chronic pain disorder, Chronic, continuous use of opioids, Compression fracture of thoracic vertebra ([address] COPD (chronic obstructive pulmonary disease) ([address] Debility, Deformity of ankle and foot, acquired, Depression, Diabetes insipidus ([address] Diverticulitis, Dry eyes, Encephalopathy acute, Gait disturbance, GERD (gastroesophageal reflux disease), Hemorrhoids, Hernia, History of blood transfusion, History of Clostridium difficile, Hyperlipidemia, Hypothyroidism, Ischemic colitis, enteritis, or enterocolitis ([address] Long term (current) use of systemic steroids, Long-term current use of steroids, Lumbar disc herniation, Myalgia and myositis, unspecified, Nephrosclerosis, Nonunion of fracture, Osteoarthritis, PAF (paroxysmal atrial fibrillation) ([facility] 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[facility] Pulmonary hypertension ([facility] Pulmonary sarcoidosis ([facility] Rectal bleeding, Rhabdomyolysis, Steroid-induced avascular necrosis of shoulder ([facility] Tibia fracture, and Ventral hernia without obstruction or gangrene.        Precautions:  Fall Risk, O2    Assessment of current deficits    [x] Functional mobility  [x]ADLs  [x] Strength               []Cognition    [x] Functional transfers   [x] IADLs         [x] Safety Awareness   [x]Endurance    [] Fine Coordination              [x] Balance      [] Vision/perception   []Sensation     []Gross Motor Coordination  [] ROM  [] Delirium                   [] Motor Control     OT PLAN OF CARE   OT POC based on physician orders, patient diagnosis and results of clinical assessment    Frequency/Duration 1-3 days/wk for 2 weeks PRN   Specific OT Treatment Interventions to include:   * Instruction/training on adapted ADL techniques and AE recommendations to increase functional independence within precautions       * Training on energy conservation strategies, correct breathing pattern and techniques to improve independence/tolerance for self-care routine  * Functional transfer/mobility training/DME recommendations for increased independence, safety, and fall prevention  * Patient/Family education to increase follow through with safety techniques and functional independence  * Recommendation of environmental modifications for increased safety with functional transfers/mobility and ADLs  * Cognitive retraining/development of therapeutic activities to improve problem solving, judgement, memory, and attention for increased safety/participation in ADL/IADL tasks  * Therapeutic exercise to improve motor endurance, ROM, and functional strength for ADLs/functional transfers  * Therapeutic activities to facilitate/challenge dynamic balance, stand tolerance for increased safety and independence with ADLs  * Therapeutic activities to facilitate gross/fine motor skills for increased independence with ADLs  * Neuro-muscular re-education: facilitation of righting/equilibrium reactions, midline orientation, scapular stability/mobility, normalization of muscle tone, and facilitation of volitional active controled movement    Recommended Adaptive Equipment:  TBD     Home Living: Pt lives with  in a 1 story home with ramp entrance    Bathroom setup: tub/shower combo    Equipment owned: na    Prior Level of Function: mod I with ADLs , mod I with IADLs; ambulated without AD   Driving: no   Occupation: na    Pain Level: Pt denies pain this session    Cognition: A&O: 4/4; Follows 2 step directions   Memory:  good   Sequencing:  good   Problem solving:  fair   Judgement/safety:  fair     Functional Assessment:  AM-PAC Daily Activity Raw Score: 16/24   Initial Eval Status  Date: 3/7/23 Treatment Status  Date: STGs = LTGs  Time frame: 10-14 days   Feeding Independent      Grooming Stand by Assist     seated  Modified [place]    UB Dressing Minimal Assist   Modified [place]    LB Dressing Moderate Assist   Modified [place]    Bathing Moderate Assist  Modified [place]    Toileting Moderate Assist   Modified [place]    Bed Mobility  Supine to sit: Stand by Assist   Sit to supine: NT  Supine to sit: Modified [place]   Sit to supine: Modified [place]    Functional Transfers Moderate Assist   Stand by Assist    Functional Mobility NT   Minimal Assist    Balance Sitting:     Static:  SBA    Dynamic:SBA  Standing: mod A     Activity Tolerance F-  F+   Visual/  Perceptual wfl                    Hand Dominance right   Strength ROM Additional Info:    RUE  proximal 3-/5  Distal 3+/5 1/2 shoulder  Elbow wfl good  and wfl FMC/dexterity noted during ADL tasks     LUE 3+/5 wfl good  and wfl FMC/dexterity noted during ADL tasks     Hearing: wfl  Sensation:wfl  Tone: wfl  Edema:none noted     Comments: Upon arrival patient supine in bed and agreeable to OT session. Therapist educated pt on role of OT. At end of session, patient seated in chair with call light and phone within reach, all lines and tubes intact. Overall patient demonstrated decreased independence and safety during completion of ADL/functional transfer/mobility tasks. Pt would benefit from continued skilled OT to increase safety and independence with completion of ADL/IADL tasks for functional independence and quality of life. Treatment: OT treatment provided this date includes: Facilitation of bed mobility, sitting balance at EOB (impacting ADLs; addressing posture, weight shifting, dynamic reaching), functional transfers (various surfaces; bed/chair), standing tolerance tasks (addressing posture, balance and activity tolerance; impacting ADLs and functional activity) - skilled cuing on sequencing, hand placement, posture, body mechanics, energy conservation techniques and safety. Therapist facilitated self-care retraining: UB/LB self-care tasks (robe socks) and grooming tasks while educating pt on modified techniques, posture, safety and energy conservation techniques. Skilled monitoring of HR, O2 sats and pts response to treatment. Rehab Potential: Good  for established goals     Patient / Family Goal: return home      Patient and/or family were instructed on functional diagnosis, prognosis/goals and OT plan of care. Demonstrated fair understanding.      Eval Complexity: Low    Time In: 1010  Time Out: 1035  Total Treatment Time: 10 minutes    Min Units   OT Eval Low 97165  x  1   OT Eval Medium 69572      OT Eval High 14436      OT Re-Eval [id number]       Therapeutic Ex 22953       Therapeutic Activities 90251  2     ADL/Self Care 55939  8  1   Orthotic Management 38367       Manual [id number]     Neuro Re-Ed 91816       Non-Billable Time          Evaluation Time additionally includes thorough review of current medical information, gathering information on past medical history/social history and prior level of function, interpretation of standardized testing/informal observation of tasks, assessment of data and development of plan of care and goals.           Reji Hurtado OTR/L #[id number]

## 2023-03-07 NOTE — PROGRESS NOTES
[facility] Attending    S: 78 y.o. female with a history of afib (off of anticoagulation due to gi bleeding), HFpEF, chronic hypoxic respiratory failure on 4-6L NC, COPD, DI, cdiff, htn, hypothyroidism, VAIN III, ischemic colitis, severe pulmonary hypertension, sarcoidosis, ventral hernia, fibromyalgia, LINDSAY and avn on chronic opioid therapy who presented for concerns of abdominal pain. Patient reports last bm was yesterday but it has been hard and usually has a bm several times per day. Pt reported that she had been taking her lasix every other day as it caused her to urinate more frequently. She was recently admitted on 2/4 and discharged on 2/8 with hypoxic respiratory failure due to chf/copd exacerbation with use of NIV. This morning, patient reports 4 bms and no abdominal pain. Some urethral irritation. O: VS- Blood pressure 106/76, pulse 71, temperature 97.9 °F (36.6 °C), temperature source Oral, resp. rate 20, height 5' (1.524 m), weight 165 lb (74.8 kg), SpO2 96 %, not currently breastfeeding. Exam is as noted by resident with the following changes, additions or corrections:  Gen: NAD on 6L NC oxygen  HEENT: NCAT, PERRL, MMM  Neck: supple  CV-RRR  Lungs-diminished bs b/l  ABD-soft . Large vental hernia palpated with reduction, otherwise nonttp  Ext-no C/C; tr edema b/l lower legs      Impressions:   Principal Problem:  Abdominal pain  Resolved Problems:    * No resolved hospital problems. *      Plan:   with some chronic abdominal discomfort likely related to ongoing chronic ventral hernia and constipation. Continue home pain meds and bowel regimen. CHF-appears clinically to be close to her baseline, bnp is not increased. She now has an elevated creatinine which may be related to exposure to CT dye, diuretics, and eating/drinking less. Creatinine is improved today. HTN-better on on the increased dose of hydralazine. Sxs, urine cx+.  Will treat empirically with bactrim ds and follow up sensitivities. Disposition planning for discharge to home today. Attending Physician Statement  I have reviewed the chart and seen the patient with the resident(s). I personally reviewed images, EKG's and similar tests, if present. I personally reviewed and performed key elements of the history and exam.  I have reviewed and confirmed student and/or resident history and exam with changes as indicated above. I agree with the assessment, plan and orders as documented by the resident. Please refer to the resident and/or student note for additional information.       Lei Mantilla MD

## 2023-03-07 NOTE — DISCHARGE INSTRUCTIONS
=====================================  [facility], [address]  =====================================    Take your medications as directed in this summary. You are being sent home on Bactrim for a UTI. Complete treatment. Future scheduled appointments are listed below or recommended appointments are mentioned above. Future Appointments   Date Time Provider Mauri Sanford   3/13/2023  3:00 PM MD Israel Lewis [facility]   3/20/2023  1:00 PM [facility] ROOM 1 [facility]       It is important that you follow up with Dr. Vanesa Casey for better monitoring of the reason of your hospitalization. Follow up with the specialists that saw you during your stay as well. If you have any questions call your PCP at [phone]. Once discharged from [facility], you can :     Return to work : Yes, you may return to work  Activity : As tolerated  Stairs : As tolerated  Exercise : As tolerated  Lifting : As tolerated   Sexual activity : Yes  Driving : With seat belt on. NO driving on narcotic pain medication if prescribed   Medications : Always take your medications as prescribed  Wound Care: none needed  Diet : You are asked to make an attempt to improve diet and exercise patterns to aid in medical management of your medical condition/problem. Call [facility] with any further questions. Return to Emergency Department with any worsening of your condition and/or fever greater than 101 degrees, new weakness, shortness of breath or chest pain.

## 2023-03-07 NOTE — CARE COORDINATION
3/7:  update CM Note:  Cm met bedside to discuss CM role & dc planning. CM advised PT 13/24 OT 16/24. Pt states her dc plan is to return home & her  can transport her. Pt states she is active with [address] with [place] & resumption of care order placed. CM let message for Anni/James. Cm called [facility] to advise dcing home- Jomar Toledo [phone]. Sw/KAREEN will continue to following for dc planning.   Electronically signed by Jamari London RN on 3/7/2023 at 3:10 PM

## 2023-03-07 NOTE — PROGRESS NOTES
Physical Therapy  Physical Therapy Initial Assessment     Name: Gerhardt Hones  : 1955  MRN: [id number]      Date of Service: 3/7/2023    Evaluating PT:  Eve Xiong PT, DPT [id number]    Room #:  2896/9645-F  Diagnosis:  Ventral hernia without obstruction or gangrene [K43.9]  Acute decompensated heart failure ([address] [I50.9]  Abdominal pain, unspecified abdominal location [R10.9]  Fatigue, unspecified type [R53.83]  Acute on chronic congestive heart failure, unspecified heart failure type ([address] [I50.9]  PMHx/PSHx:  a fib, anemia, CKD, COPD, DM, GERD, hernia, HLD, HTN  Procedure/Surgery:  none this admission  Precautions:  Falls, O2  Equipment Needs:  none, pt has [employer] and WC    SUBJECTIVE:    Pt lives with  in a 1 story home with ramped entry. Bed is on 1st floor and bath is on 1st floor. Pt completed mobility with WC PTA. Pt able to complete stand pivot transfers to [facility] without [employer].    OBJECTIVE:   Initial Evaluation  Date: 3/7/23 Treatment Short Term/ Long Term   Goals   AM-PAC 6 Clicks 42/83     Was pt agreeable to Eval/treatment? yes     Does pt have pain? No c/o pain     Bed Mobility  Rolling: SBA  Supine to sit: SBA  Sit to supine: NT  Scooting: SBA  Rolling: Independent   Supine to sit: Independent   Sit to supine: Independent   Scooting: Independent    Transfers Sit to stand: ModA from EOB, Madi from chair  Stand to sit: Madi  Stand pivot: ModA with [employer]  Sit to stand: Independent   Stand to sit: Independent   Stand pivot: Mod I with [employer]   Ambulation    NT d/t knee pain  15 feet with [employer] Mod I   Stair negotiation: ascended and descended  NT  TBD   ROM BUE:  Defer to OT note  BLE:  WFL     Strength BUE:  Defer to OT note  BLE:  3+/5  WNL   Balance Sitting EOB:  SBA  Dynamic Standing:  ModA with [employer]  Sitting EOB:  Independent   Dynamic Standing:   Mod I with [employer]     Pt is A & O x 3  Sensation:  denies abnormalities  Edema:  none noted    Vitals:  SPO2 on 5L: 89-95%    Patient education  Pt educated on role of PT, safety during mobility    Patient response to education:   Pt verbalized understanding Pt demonstrated skill Pt requires further education in this area   yes yes yes     ASSESSMENT:    Conditions Requiring Skilled Therapeutic Intervention:    [x]Decreased strength     [x]Decreased ROM  [x]Decreased functional mobility  [x]Decreased balance   [x]Decreased endurance   []Decreased posture  []Decreased sensation  []Decreased coordination   []Decreased vision  []Decreased safety awareness   []Increased pain       Comments:  patient semi-supine in bed upon entry and agreeable to PT evaluation. Pt able to complete bed mobility with no hands on assist. Pt sat EOB for approximately 8 minutes during session with no assist for sitting balance. STS from EOB required significant lift assist. Pivot to chair with WW and balance assist required d/t occasional knee buckling. Second STS from chair with decreased assist required with pt able to use B handrails. Pt left in chair at end of session with all needs met. Patient to benefit from continued skilled PT at [place] to improve functional mobility and decrease fall risk. Treatment:  Patient practiced and was instructed in the following treatment:    Bed Mobility: VCs provided for sequencing and safety during mobility. Transfer Training: Verbal and tactile cueing provided for sequencing and safety during mobility. Manual assist provided for completion of task  Therapeutic Activities: pt sat EOB for approximately 8 minutes during session with no assist for static and dynamic sitting balance. Pt's/ family goals   1. None stated    Prognosis is good for reaching above PT goals. Patient and or family understand(s) diagnosis, prognosis, and plan of care.   yes    PHYSICAL THERAPY PLAN OF CARE:    PT POC is established based on physician order and patient diagnosis     Referring provider/PT Order:    03/06/23 1815  PT eval and treat  Start:  03/06/23 1815,   End: 03/06/23 1815,   ONE TIME,   Standing Count:  1 Occurrences,   Mercy Cruz MD     Diagnosis:  Ventral hernia without obstruction or gangrene [K43.9]  Acute decompensated heart failure ([address] [I50.9]  Abdominal pain, unspecified abdominal location [R10.9]  Fatigue, unspecified type [R53.83]  Acute on chronic congestive heart failure, unspecified heart failure type ([address] [I50.9]  Specific instructions for next treatment:  progress mobility as appropriate    Current Treatment Recommendations:     [x] Strengthening to improve independence with functional mobility   [x] ROM to improve independence with functional mobility   [x] Balance Training to improve static/dynamic balance and to reduce fall risk  [x] Endurance Training to improve activity tolerance during functional mobility   [x] Transfer Training to improve safety and independence with all functional transfers   [x] Gait Training to improve gait mechanics, endurance and assess need for appropriate assistive device  [] Stair Training in preparation for safe discharge home and/or into the community   [] Positioning to prevent skin breakdown and contractures  [x] Safety and Education Training   [x] Patient/Caregiver Education   [] HEP  [x] Other     PT long term treatment goals are located in above grid    Frequency of treatments: 2-5x/week x 1-2 weeks. Time in  1010  Time out  1030    Total Treatment Time  10 minutes     Evaluation Time includes thorough review of current medical information, gathering information on past medical history/social history and prior level of function, completion of standardized testing/informal observation of tasks, assessment of data and education on plan of care and goals.     CPT codes:  [x] Low Complexity PT evaluation 83041  [] Moderate Complexity PT evaluation 88350  [] High Complexity PT evaluation 69054  [] PT Re-evaluation 23153  [] Gait training 03004 - minutes  [] Manual therapy 74225 - minutes  [x] Therapeutic activities 08742 10 minutes  [] Therapeutic exercises 03806 - minutes  [] Neuromuscular reeducation 06654 - minutes     Jarek Woods PT, DPT  [id number]

## 2023-03-08 ENCOUNTER — TELEPHONE (OUTPATIENT)
Dept: FAMILY MEDICINE CLINIC | Age: 68
End: 2023-03-08

## 2023-03-08 LAB
ORGANISM: ABNORMAL
URINE CULTURE, ROUTINE: ABNORMAL

## 2023-03-08 NOTE — TELEPHONE ENCOUNTER
Care Transitions Initial Follow Up Call    Outreach made within 2 business days of discharge: Yes    Patient: Merline Goddard Patient : 1955   MRN: [id number]  Reason for Admission: There are no discharge diagnoses documented for the most recent discharge.   Discharge Date: 3/7/23       Spoke with: message left for patient    Discharge department/facility: [facility]        Scheduled appointment with PCP within 7-14 days    Follow Up  Future Appointments   Date Time Provider Mauri Sanford   3/13/2023  3:00 PM MD Agnes Watts [facility]   3/20/2023  1:00 PM [facility] ROOM 1 [facility] Daniel Wen RN

## 2023-03-13 ENCOUNTER — OFFICE VISIT (OUTPATIENT)
Dept: FAMILY MEDICINE CLINIC | Age: 68
End: 2023-03-13
Payer: COMMERCIAL

## 2023-03-13 VITALS
BODY MASS INDEX: 32.39 KG/M2 | WEIGHT: 165 LBS | HEART RATE: 53 BPM | HEIGHT: 60 IN | OXYGEN SATURATION: 98 % | SYSTOLIC BLOOD PRESSURE: 109 MMHG | TEMPERATURE: 97.2 F | DIASTOLIC BLOOD PRESSURE: 71 MMHG | RESPIRATION RATE: 18 BRPM

## 2023-03-13 DIAGNOSIS — G89.4 CHRONIC PAIN SYNDROME: ICD-10-CM

## 2023-03-13 DIAGNOSIS — K59.03 DRUG-INDUCED CONSTIPATION: ICD-10-CM

## 2023-03-13 DIAGNOSIS — Z09 HOSPITAL DISCHARGE FOLLOW-UP: Primary | ICD-10-CM

## 2023-03-13 DIAGNOSIS — R05.3 CHRONIC COUGH: ICD-10-CM

## 2023-03-13 PROCEDURE — G8484 FLU IMMUNIZE NO ADMIN: HCPCS | Performed by: FAMILY MEDICINE

## 2023-03-13 PROCEDURE — 1111F DSCHRG MED/CURRENT MED MERGE: CPT | Performed by: FAMILY MEDICINE

## 2023-03-13 PROCEDURE — 99213 OFFICE O/P EST LOW 20 MIN: CPT | Performed by: FAMILY MEDICINE

## 2023-03-13 PROCEDURE — 3078F DIAST BP <80 MM HG: CPT | Performed by: FAMILY MEDICINE

## 2023-03-13 PROCEDURE — 1123F ACP DISCUSS/DSCN MKR DOCD: CPT | Performed by: FAMILY MEDICINE

## 2023-03-13 PROCEDURE — G8427 DOCREV CUR MEDS BY ELIG CLIN: HCPCS | Performed by: FAMILY MEDICINE

## 2023-03-13 PROCEDURE — G8417 CALC BMI ABV UP PARAM F/U: HCPCS | Performed by: FAMILY MEDICINE

## 2023-03-13 PROCEDURE — 1036F TOBACCO NON-USER: CPT | Performed by: FAMILY MEDICINE

## 2023-03-13 PROCEDURE — 3074F SYST BP LT 130 MM HG: CPT | Performed by: FAMILY MEDICINE

## 2023-03-13 PROCEDURE — 1090F PRES/ABSN URINE INCON ASSESS: CPT | Performed by: FAMILY MEDICINE

## 2023-03-13 PROCEDURE — 3017F COLORECTAL CA SCREEN DOC REV: CPT | Performed by: FAMILY MEDICINE

## 2023-03-13 PROCEDURE — G8399 PT W/DXA RESULTS DOCUMENT: HCPCS | Performed by: FAMILY MEDICINE

## 2023-03-13 RX ORDER — OXYCODONE HYDROCHLORIDE AND ACETAMINOPHEN 5; 325 MG/1; MG/1
1 TABLET ORAL EVERY 4 HOURS PRN
Qty: 135 TABLET | Refills: 0 | Status: SHIPPED | OUTPATIENT
Start: 2023-03-13 | End: 2023-04-13

## 2023-03-13 RX ORDER — FLUTICASONE FUROATE AND VILANTEROL TRIFENATATE 100; 25 UG/1; UG/1
POWDER RESPIRATORY (INHALATION)
Qty: 60 EACH | Refills: 0 | Status: SHIPPED | OUTPATIENT
Start: 2023-03-13

## 2023-03-13 RX ORDER — PROMETHAZINE HYDROCHLORIDE AND CODEINE PHOSPHATE 6.25; 1 MG/5ML; MG/5ML
5 SYRUP ORAL 4 TIMES DAILY PRN
Qty: 473 ML | Refills: 1 | Status: SHIPPED | OUTPATIENT
Start: 2023-03-13 | End: 2023-05-12

## 2023-03-13 RX ORDER — ALBUTEROL SULFATE 90 UG/1
2 AEROSOL, METERED RESPIRATORY (INHALATION) EVERY 6 HOURS PRN
Qty: 1 EACH | Refills: 5 | Status: SHIPPED
Start: 2023-03-13 | End: 2023-03-24 | Stop reason: SDUPTHER

## 2023-03-13 NOTE — PROGRESS NOTES
S: 78 y.o. female here for hospital f/u for AMS and abd pain. Constipation improved. Abd pain improved. AMS resolved. Chronic opioids. O: VS: /71 (Site: Right Upper Arm, Position: Sitting, Cuff Size: Medium Adult)   Pulse 53   Temp 97.2 °F (36.2 °C) (Temporal)   Resp 18   Ht 5' (1.524 m)   Wt 165 lb (74.8 kg)   LMP  (LMP Unknown)   SpO2 98% Comment: patiet is on 6 units of 02  BMI 32.22 kg/m²    General: NAD, alert and interacting appropriately. CV:  RRR, no gallops, rubs, or murmurs    Resp: CTAB. Abd:  large abd wall hernia, binder in place. No ttp. Ext:  No edema    Impression:  hospital f/u for AMS and constipation  Plan:   Cont colace  Decrease percocet  Rtc to discuss memory issues      Attending Physician Statement  I have discussed the case, including pertinent history and exam findings with the resident. I agree with the documented assessment and plan.
opioids  At this time patient is asymptomatic, continue with Colace for now  Can consider adding MiraLAX again if needed. Medical Decision Making: moderate complexity  Return in about 4 weeks (around 4/10/2023) for Follow up. On this date 3/13/2023 I have spent 30 minutes reviewing previous notes, test results and face to face with the patient discussing the diagnosis and importance of compliance with the treatment plan as well as documenting on the day of the visit. Subjective:   HPI:  Follow up of Hospital problems/diagnosis(es): Abdominal pain, change in mental status  Inpatient course: Discharge summary reviewed- see chart. Patient was found to have altered mentation, acting goofy as well as complaining of abdominal pain  She states that her abdominal pain was her main concern at the time which began approximately 3 days ago  Described the pain as being of left lower quadrant as well as right lower quadrant. No inciting event for the pain. Notes that her previous bowel movement was approximately 3 days ago, was taking Colace without any significant improvement so far. She denied any black or tarry stools during admission. There was previous training and pain with previous BMs. The ER, patient remained stable. Her proBNP was low at 2400. Troponins were cycled, 24 and 18 at the time. She was given IV Lasix, and admitted for worsening abdominal pain. During her hospital stay, treated with Senokot and GlycoLax. After 1 day in the hospital, patient did have a larger bowel movement which helped pain improved. She was given fluids due to an elevated creatinine. At the time of discharge, her creatinine improved to 1.1. Her urine culture did grow gram-negative rods, she was started on antibiotics due to concerns for worsening abdominal pain and a positive cultures. She was discharged on 3 days of Bactrim.     Interval history/Current status:     Patient overall doing well at this time,

## 2023-03-20 ENCOUNTER — HOSPITAL ENCOUNTER (OUTPATIENT)
Dept: OTHER | Age: 68
Setting detail: THERAPIES SERIES
Discharge: HOME | End: 2023-03-20

## 2023-03-20 NOTE — PLAN OF CARE
Problem: Chronic Conditions and Co-morbidities  Goal: Patient's chronic conditions and co-morbidity symptoms are monitored and maintained or improved  Flowsheets (Taken 3/20/2023 1567)  Care Plan - Patient's Chronic Conditions and Co-Morbidity Symptoms are Monitored and Maintained or Improved: Monitor and assess patient's chronic conditions and comorbid symptoms for stability, deterioration, or improvement

## 2023-03-24 ENCOUNTER — OFFICE VISIT (OUTPATIENT)
Dept: FAMILY MEDICINE CLINIC | Age: 68
End: 2023-03-24

## 2023-03-24 VITALS
TEMPERATURE: 97 F | DIASTOLIC BLOOD PRESSURE: 81 MMHG | RESPIRATION RATE: 18 BRPM | WEIGHT: 165 LBS | HEIGHT: 60 IN | OXYGEN SATURATION: 93 % | HEART RATE: 51 BPM | SYSTOLIC BLOOD PRESSURE: 135 MMHG | BODY MASS INDEX: 32.39 KG/M2

## 2023-03-24 DIAGNOSIS — R05.3 CHRONIC COUGH: ICD-10-CM

## 2023-03-24 DIAGNOSIS — E55.9 VITAMIN D DEFICIENCY: ICD-10-CM

## 2023-03-24 DIAGNOSIS — H04.123 DRY EYES: ICD-10-CM

## 2023-03-24 DIAGNOSIS — R41.3 MEMORY IMPAIRMENT: Primary | ICD-10-CM

## 2023-03-24 RX ORDER — ALBUTEROL SULFATE 90 UG/1
2 AEROSOL, METERED RESPIRATORY (INHALATION) EVERY 6 HOURS PRN
Qty: 1 EACH | Refills: 5 | Status: SHIPPED | OUTPATIENT
Start: 2023-03-24

## 2023-03-24 RX ORDER — CYCLOSPORINE 0.5 MG/ML
1 EMULSION OPHTHALMIC EVERY 12 HOURS
Qty: 5.5 ML | Refills: 1 | Status: SHIPPED | OUTPATIENT
Start: 2023-03-24

## 2023-03-24 NOTE — PROGRESS NOTES
Attending Physician Statement    S:   Chief Complaint   Patient presents with    Memory Loss     F/u    Discuss Medications     Cough medication and pain medication       Concerned about memory loss. Forgets recent events, forgets appointments. Remembers people and places. Long-term opioids for chronic pain  O: Blood pressure 135/81, pulse 51, temperature 97 °F (36.1 °C), temperature source Temporal, resp. rate 18, height 5' (1.524 m), weight 165 lb (74.8 kg), SpO2 93 %, not currently breastfeeding. Exam:   Heart - RRR   Lungs - clear   Appropriate, oriented   MMSE = 28/30. Recalled 2/3 objects  A: Mild memory loss  P:  Encourage continued tapering of opioids   Labs   Follow-up as ordered    I have discussed the case, including pertinent history and exam findings with the resident. I agree with the documented assessment and plan.     Joel Carter MD

## 2023-03-24 NOTE — PROGRESS NOTES
[facility]  Department of [facility] Medicine  Family Medicine Residency Program    Date of Visit: 3/24/2023     Chief Complaint     Chief Complaint   Patient presents with    Memory Loss     F/u        History of Present Illness     HPI:  78 y.o. female presents for concerns of memory loss. Memory loss  Patient with history chronic opioid use, history of sarcoidosis, CHF, bronchitis and continuous oxygen use   present at bedside states that patient has moments where she forgets that she will be staying in the middle of a sentence, does not recall her appointments, is unable to manage finances   states that patient questions activities and concepts that may have happened a few minutes ago, overall being very concerned about her short-term memory  Patient has no difficulty remembering people and places  Would like to start a work-up for memory loss today    No other concerns at this time    Social History     Tobacco Use    Smoking status: Former     Packs/day: 0.50     Years: 25.00     Pack years: 12.50     Types: Cigarettes     Start date:      Quit date: 9/10/1996     Years since quittin.5    Smokeless tobacco: Never    Tobacco comments:     Patient quit smoking 26 yrs.ago. Vaping Use    Vaping Use: Never used    Passive vaping exposure: Yes   Substance Use Topics    Alcohol use: Never     Alcohol/week: 0.0 standard drinks    Drug use: Not Currently     Comment:  tory       ROS:    Reviewed, pertinent as above otherwise negative    Objective:    VS:  Blood pressure 135/81, pulse 51, temperature 97 °F (36.1 °C), temperature source Temporal, resp. rate 18, height 5' (1.524 m), weight 165 lb (74.8 kg), SpO2 93 %, not currently breastfeeding. Physical Exam  Cardiovascular:      Rate and Rhythm: Normal rate and regular rhythm. Pulses: Normal pulses. Heart sounds: Normal heart sounds. No murmur heard. No gallop.    Pulmonary:      Effort:

## 2023-03-30 ENCOUNTER — TELEPHONE (OUTPATIENT)
Dept: OTHER | Facility: CLINIC | Age: 68
End: 2023-03-30

## 2023-03-30 ENCOUNTER — APPOINTMENT (OUTPATIENT)
Dept: GENERAL RADIOLOGY | Age: 68
End: 2023-03-30
Payer: COMMERCIAL

## 2023-03-30 ENCOUNTER — HOSPITAL ENCOUNTER (EMERGENCY)
Age: 68
Discharge: HOME | End: 2023-03-30
Attending: EMERGENCY MEDICINE
Payer: COMMERCIAL

## 2023-03-30 ENCOUNTER — APPOINTMENT (OUTPATIENT)
Dept: CT IMAGING | Age: 68
End: 2023-03-30
Payer: COMMERCIAL

## 2023-03-30 VITALS
BODY MASS INDEX: 32.22 KG/M2 | DIASTOLIC BLOOD PRESSURE: 97 MMHG | TEMPERATURE: 97.5 F | SYSTOLIC BLOOD PRESSURE: 160 MMHG | WEIGHT: 165 LBS | OXYGEN SATURATION: 98 % | HEART RATE: 54 BPM | RESPIRATION RATE: 23 BRPM

## 2023-03-30 DIAGNOSIS — R07.9 CHEST PAIN, UNSPECIFIED TYPE: Primary | ICD-10-CM

## 2023-03-30 LAB
ALBUMIN SERPL-MCNC: 3.7 G/DL (ref 3.5–5.2)
ALP SERPL-CCNC: 64 U/L (ref 35–104)
ALT SERPL-CCNC: 18 U/L (ref 0–32)
ANION GAP SERPL CALCULATED.3IONS-SCNC: 7 MMOL/L (ref 7–16)
AST SERPL-CCNC: 25 U/L (ref 0–31)
BASOPHILS # BLD: 0.01 E9/L (ref 0–0.2)
BASOPHILS NFR BLD: 0.2 % (ref 0–2)
BILIRUB SERPL-MCNC: 0.5 MG/DL (ref 0–1.2)
BUN SERPL-MCNC: 26 MG/DL (ref 6–23)
CALCIUM SERPL-MCNC: 9.9 MG/DL (ref 8.6–10.2)
CHLORIDE SERPL-SCNC: 97 MMOL/L (ref 98–107)
CO2 SERPL-SCNC: 37 MMOL/L (ref 22–29)
CREAT SERPL-MCNC: 1.1 MG/DL (ref 0.5–1)
EKG ATRIAL RATE: 53 BPM
EKG P AXIS: 47 DEGREES
EKG P-R INTERVAL: 136 MS
EKG Q-T INTERVAL: 428 MS
EKG QRS DURATION: 78 MS
EKG QTC CALCULATION (BAZETT): 401 MS
EKG R AXIS: 62 DEGREES
EKG T AXIS: -10 DEGREES
EKG VENTRICULAR RATE: 53 BPM
EOSINOPHIL # BLD: 0.11 E9/L (ref 0.05–0.5)
EOSINOPHIL NFR BLD: 2.7 % (ref 0–6)
ERYTHROCYTE [DISTWIDTH] IN BLOOD BY AUTOMATED COUNT: 14.8 FL (ref 11.5–15)
GLUCOSE SERPL-MCNC: 77 MG/DL (ref 74–99)
HCT VFR BLD AUTO: 33.8 % (ref 34–48)
HGB BLD-MCNC: 10.2 G/DL (ref 11.5–15.5)
IMM GRANULOCYTES # BLD: 0.02 E9/L
IMM GRANULOCYTES NFR BLD: 0.5 % (ref 0–5)
INR BLD: 1.1
LIPASE: 32 U/L (ref 13–60)
LYMPHOCYTES # BLD: 0.62 E9/L (ref 1.5–4)
LYMPHOCYTES NFR BLD: 14.9 % (ref 20–42)
MCH RBC QN AUTO: 26 PG (ref 26–35)
MCHC RBC AUTO-ENTMCNC: 30.2 % (ref 32–34.5)
MCV RBC AUTO: 86 FL (ref 80–99.9)
MONOCYTES # BLD: 0.55 E9/L (ref 0.1–0.95)
MONOCYTES NFR BLD: 13.3 % (ref 2–12)
NEUTROPHILS # BLD: 2.84 E9/L (ref 1.8–7.3)
NEUTS SEG NFR BLD: 68.4 % (ref 43–80)
PLATELET # BLD AUTO: 101 E9/L (ref 130–450)
PMV BLD AUTO: 10.5 FL (ref 7–12)
POTASSIUM SERPL-SCNC: 4.5 MMOL/L (ref 3.5–5)
PROT SERPL-MCNC: 6.5 G/DL (ref 6.4–8.3)
PROTHROMBIN TIME: 12 SEC (ref 9.3–12.4)
RBC # BLD AUTO: 3.93 E12/L (ref 3.5–5.5)
REASON FOR REJECTION: NORMAL
REJECTED TEST: NORMAL
SODIUM SERPL-SCNC: 141 MMOL/L (ref 132–146)
TROPONIN, HIGH SENSITIVITY: 18 NG/L (ref 0–9)
WBC # BLD: 4.2 E9/L (ref 4.5–11.5)

## 2023-03-30 PROCEDURE — 99285 EMERGENCY DEPT VISIT HI MDM: CPT

## 2023-03-30 PROCEDURE — 71045 X-RAY EXAM CHEST 1 VIEW: CPT

## 2023-03-30 PROCEDURE — 85610 PROTHROMBIN TIME: CPT

## 2023-03-30 PROCEDURE — 93010 ELECTROCARDIOGRAM REPORT: CPT | Performed by: INTERNAL MEDICINE

## 2023-03-30 PROCEDURE — 36415 COLL VENOUS BLD VENIPUNCTURE: CPT

## 2023-03-30 PROCEDURE — 83690 ASSAY OF LIPASE: CPT

## 2023-03-30 PROCEDURE — 93005 ELECTROCARDIOGRAM TRACING: CPT | Performed by: EMERGENCY MEDICINE

## 2023-03-30 PROCEDURE — 84484 ASSAY OF TROPONIN QUANT: CPT

## 2023-03-30 PROCEDURE — 85025 COMPLETE CBC W/AUTO DIFF WBC: CPT

## 2023-03-30 PROCEDURE — 74177 CT ABD & PELVIS W/CONTRAST: CPT

## 2023-03-30 PROCEDURE — 6360000004 HC RX CONTRAST MEDICATION: Performed by: RADIOLOGY

## 2023-03-30 PROCEDURE — 6370000000 HC RX 637 (ALT 250 FOR IP): Performed by: EMERGENCY MEDICINE

## 2023-03-30 PROCEDURE — 80053 COMPREHEN METABOLIC PANEL: CPT

## 2023-03-30 RX ADMIN — IOPAMIDOL 75 ML: 755 INJECTION, SOLUTION INTRAVENOUS at 16:26

## 2023-03-30 RX ADMIN — NITROGLYCERIN 1 INCH: 20 OINTMENT TOPICAL at 12:28

## 2023-03-30 NOTE — TELEPHONE ENCOUNTER
Writer contacted ED provider to inform of 30 day readmission risk. Writer's attempt to contact ED provider was unsuccessful.       Call Back: If you need to call back to inform of disposition you can contact me at [phone]     Attending physician:  Dr Cristy Jackson

## 2023-03-30 NOTE — ED PROVIDER NOTES
HPI:  3/30/23,   Time: 12:11 PM EDT       Chris Fox is a 78 y.o. female presenting to the ED for abd pain/low hr, beginning unk ago. The complaint has been persistent, mild in severity, and worsened by nothing. Sent low heart rate by home health care. EMS reports heart rate 50s. Patient has of chronic chest pain and chronic abdominal pain. Nothing seems to be new. No fever/chills/sweats/nausea/vomiting/diarrhea. Patient difficult historian.     Review of Systems:   Pertinent positives and negatives are stated within HPI, all other systems reviewed and are negative.          --------------------------------------------- PAST HISTORY ---------------------------------------------  Past Medical History:  has a past medical history of A-fib ([address] Able to transfer from chair to wheelchair, Abnormal mammogram, Acute on chronic respiratory failure ([address] Anemia, Anemia due to chronic illness, Ankle fracture, left, 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[address] Backache, Benign hypertension, CHF (congestive heart failure) ([address] Chronic back pain, Chronic kidney disease, Chronic pain disorder, Chronic, continuous use of opioids, Compression fracture of thoracic vertebra ([address] COPD (chronic obstructive pulmonary disease) ([address] Debility, Deformity of ankle and foot, acquired, Depression, Diabetes insipidus ([address] Diverticulitis, Dry eyes, Encephalopathy acute, Gait disturbance, GERD (gastroesophageal reflux disease), Hemorrhoids, Hernia, History of blood transfusion, History of Clostridium difficile, Hyperlipidemia, Hypothyroidism, Ischemic colitis, enteritis, or enterocolitis ([address] Long term (current) use of systemic steroids, Long-term current use of steroids, Lumbar disc herniation, Myalgia and myositis, unspecified, Nephrosclerosis, Nonunion of fracture, Osteoarthritis, PAF (paroxysmal atrial fibrillation) ([address] 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[address] Pulmonary hypertension ([address] Pulmonary sarcoidosis ([address] Absolute 0.01 0.00 - 0.20 E9/L   CMP   Result Value Ref Range    Sodium 141 132 - 146 mmol/L    Potassium 4.5 3.5 - 5.0 mmol/L    Chloride 97 (L) 98 - 107 mmol/L    CO2 37 (H) 22 - 29 mmol/L    Anion Gap 7 7 - 16 mmol/L    Glucose 77 74 - 99 mg/dL    BUN 26 (H) 6 - 23 mg/dL    Creatinine 1.1 (H) 0.5 - 1.0 mg/dL    Est, Glom Filt Rate 55 >=60 mL/min/1.73    Calcium 9.9 8.6 - 10.2 mg/dL    Total Protein 6.5 6.4 - 8.3 g/dL    Albumin 3.7 3.5 - 5.2 g/dL    Total Bilirubin 0.5 0.0 - 1.2 mg/dL    Alkaline Phosphatase 64 35 - 104 U/L    ALT 18 0 - 32 U/L    AST 25 0 - 31 U/L   Protime-INR   Result Value Ref Range    Protime 12.0 9.3 - 12.4 sec    INR 1.1    Lipase   Result Value Ref Range    Lipase 32 13 - 60 U/L   Troponin   Result Value Ref Range    Troponin, High Sensitivity 18 (H) 0 - 9 ng/L   SPECIMEN REJECTION   Result Value Ref Range    Rejected Test TROP     Reason for Rejection see below    EKG 12 Lead   Result Value Ref Range    Ventricular Rate 53 BPM    Atrial Rate 53 BPM    P-R Interval 136 ms    QRS Duration 78 ms    Q-T Interval 428 ms    QTc Calculation (Bazett) 401 ms    P Axis 47 degrees    R Axis 62 degrees    T Axis -10 degrees       RADIOLOGY:  Interpreted by Radiologist.  CT ABDOMEN PELVIS W IV CONTRAST Additional Contrast? None   Final Result   1. Stable large ventral abdominal wall hernia. 2. Diverticulosis. 3. No bowel obstruction, free air, or focal inflammatory changes. XR CHEST PORTABLE   Final Result   1. Patchy bilateral lower lobe airspace disease   2. Cardiomegaly   3. Trace left pleural effusion             EKG:  This EKG is signed and interpreted by the EP.     Time: 1148  Rate: 55  Rhythm: Sinus  Interpretation: sinus bradycardia  Comparison: stable as compared to patient's most recent EKG      ------------------------- NURSING NOTES AND VITALS REVIEWED ---------------------------   The nursing notes within the ED encounter and vital signs as below have been reviewed by

## 2023-03-31 ENCOUNTER — HOSPITAL ENCOUNTER (OUTPATIENT)
Dept: OTHER | Age: 68
Setting detail: THERAPIES SERIES
Discharge: HOME | End: 2023-03-31

## 2023-04-03 ENCOUNTER — HOSPITAL ENCOUNTER (OUTPATIENT)
Dept: OTHER | Age: 68
Setting detail: THERAPIES SERIES
Discharge: HOME | End: 2023-04-03
Payer: COMMERCIAL

## 2023-04-03 ENCOUNTER — TELEPHONE (OUTPATIENT)
Dept: FAMILY MEDICINE CLINIC | Age: 68
End: 2023-04-03

## 2023-04-03 VITALS
WEIGHT: 153 LBS | BODY MASS INDEX: 29.88 KG/M2 | DIASTOLIC BLOOD PRESSURE: 56 MMHG | RESPIRATION RATE: 22 BRPM | OXYGEN SATURATION: 99 % | HEART RATE: 54 BPM | SYSTOLIC BLOOD PRESSURE: 109 MMHG

## 2023-04-03 LAB
ANION GAP SERPL CALCULATED.3IONS-SCNC: 8 MMOL/L (ref 7–16)
BNP BLD-MCNC: 739 PG/ML (ref 0–125)
BUN SERPL-MCNC: 39 MG/DL (ref 6–23)
CALCIUM SERPL-MCNC: 9.6 MG/DL (ref 8.6–10.2)
CHLORIDE SERPL-SCNC: 99 MMOL/L (ref 98–107)
CO2 SERPL-SCNC: 35 MMOL/L (ref 22–29)
CREAT SERPL-MCNC: 1.1 MG/DL (ref 0.5–1)
GLUCOSE SERPL-MCNC: 91 MG/DL (ref 74–99)
POTASSIUM SERPL-SCNC: 3.3 MMOL/L (ref 3.5–5)
SODIUM SERPL-SCNC: 142 MMOL/L (ref 132–146)

## 2023-04-03 PROCEDURE — 80048 BASIC METABOLIC PNL TOTAL CA: CPT

## 2023-04-03 PROCEDURE — 99204 OFFICE O/P NEW MOD 45 MIN: CPT

## 2023-04-03 PROCEDURE — 83880 ASSAY OF NATRIURETIC PEPTIDE: CPT

## 2023-04-03 RX ORDER — POTASSIUM CHLORIDE 20 MEQ/1
40 TABLET, EXTENDED RELEASE ORAL DAILY
Qty: 4 TABLET | Refills: 0 | Status: SHIPPED | OUTPATIENT
Start: 2023-04-03 | End: 2023-04-05

## 2023-04-03 NOTE — PLAN OF CARE
Problem: Chronic Conditions and Co-morbidities  Goal: Patient's chronic conditions and co-morbidity symptoms are monitored and maintained or improved  Flowsheets (Taken 4/3/2023 1677)  Care Plan - Patient's Chronic Conditions and Co-Morbidity Symptoms are Monitored and Maintained or Improved: Monitor and assess patient's chronic conditions and comorbid symptoms for stability, deterioration, or improvement

## 2023-04-03 NOTE — PROGRESS NOTES
Called patient to review low K  K 3.3 on lab work  Patient was not available on the phone  Did try both numbers available    Left voicemail advising doubling up on [address] (patient has 20 meq tabs at home) for the next 1-2 days    Will repeat labs in the next 1 week    Will attempt to call again tomorrow and advise.     Tyshawn Staples  PGY 3   Family medicine

## 2023-04-03 NOTE — PROGRESS NOTES
Today's potassium called and faxed to Dr Perri Shaffer office     Today's labs faxed to Dr Tiffany Astudillo office for [facility] purpose.
(mmol/L)   Date Value   03/07/2023 4.0   03/06/2023 4.9   03/05/2023 3.2 (L)     Chloride (mmol/L)   Date Value   03/30/2023 97 (L)   03/07/2023 100   03/06/2023 101     CO2 (mmol/L)   Date Value   03/30/2023 37 (H)   03/07/2023 36 (H)   03/06/2023 37 (H)     BUN (mg/dL)   Date Value   03/30/2023 26 (H)   03/07/2023 25 (H)   03/06/2023 26 (H)     Glucose (mg/dL)   Date Value   03/30/2023 77   03/07/2023 77   03/06/2023 102 (H)   05/02/2012 116 (H)   04/28/2012 95   04/04/2012 90     Calcium (mg/dL)   Date Value   03/30/2023 9.9   03/07/2023 8.9   03/06/2023 8.8       Last 3 BNP       Pro-BNP (pg/mL)   Date Value   03/05/2023 2,420 (H)   02/07/2023 5,393 (H)   02/05/2023 6,204 (H)          CBC: No results for input(s): WBC, HGB, PLT in the last 72 hours. BMP:  No results for input(s): NA, K, CL, CO2, BUN, CREATININE, GLUCOSE in the last 72 hours. Hepatic: No results for input(s): AST, ALT, ALB, BILITOT, ALKPHOS in the last 72 hours. Troponin: No results for input(s): TROPONINI in the last 72 hours. BNP: No results for input(s): BNP in the last 72 hours. Lipids: No results for input(s): CHOL, HDL in the last 72 hours. Invalid input(s): LDLCALCU  INR: No results for input(s): INR in the last 72 hours. WEIGHTS:    Wt Readings from Last 3 Encounters:   04/03/23 153 lb (69.4 kg)   03/30/23 165 lb (74.8 kg)   03/24/23 165 lb (74.8 kg)         TELEMETRY:  Cardiac Regularity: irregular  Cardiac Rhythm/Interpretation: sb with pac's         ASSESSMENT:  Rebekah Rodriguez's first visit. She has not been weighing herself at home. She needs assistance from her , much difficulty standing d/t sarcoidosis. Instructed on the importance of daily weights. Her  mentioned he tries to encourage low sodium diet, but at times she does not follow advise. We enforced the importance of compliance.     Interventions completed this visit:  IV diuretics given no  Lab work obtained yes, bmp, bnp   Reviewed currently

## 2023-04-04 ENCOUNTER — TELEPHONE (OUTPATIENT)
Dept: FAMILY MEDICINE CLINIC | Age: 68
End: 2023-04-04

## 2023-04-04 DIAGNOSIS — G89.4 CHRONIC PAIN SYNDROME: Primary | ICD-10-CM

## 2023-04-04 NOTE — TELEPHONE ENCOUNTER
The patient called re her Percocet refill  I advised patient that by the prescription she should have pills to last until 4/13/23. She was given #120 on 3/13/23. I confirmed with pharmacy and was advised by the pharmacist that she was given #120   The patient states \" well that is not the case \"   And she wants to speak with the Dr about getting more because \" she just don't have enough\". I again emphasized to the patient that she was prescribed the patient to take no more than #4 a day.   The patient said \" that not the case \"  I explained to the patient that I would let her physician know that she needs a refill 9 days earlier than she should and she requested the doctor to call her because \" she dont get why she cant have more\"

## 2023-04-06 DIAGNOSIS — Z76.0 MEDICATION REFILL: ICD-10-CM

## 2023-04-06 PROBLEM — N39.0 URINARY TRACT INFECTION: Status: RESOLVED | Noted: 2023-03-07 | Resolved: 2023-04-06

## 2023-04-06 RX ORDER — ESCITALOPRAM OXALATE 10 MG/1
TABLET ORAL
Qty: 60 TABLET | Refills: 0 | Status: SHIPPED | OUTPATIENT
Start: 2023-04-06

## 2023-04-06 RX ORDER — FAMOTIDINE 20 MG/1
TABLET, FILM COATED ORAL
Qty: 60 TABLET | Refills: 0 | Status: SHIPPED | OUTPATIENT
Start: 2023-04-06

## 2023-04-06 RX ORDER — OXYCODONE HYDROCHLORIDE AND ACETAMINOPHEN 5; 325 MG/1; MG/1
1 TABLET ORAL EVERY 4 HOURS PRN
Qty: 15 TABLET | Refills: 0 | Status: SHIPPED | OUTPATIENT
Start: 2023-04-06 | End: 2023-04-13

## 2023-04-06 RX ORDER — OMEGA-3-ACID ETHYL ESTERS 1 G/1
CAPSULE, LIQUID FILLED ORAL
Qty: 180 CAPSULE | Refills: 0 | Status: SHIPPED | OUTPATIENT
Start: 2023-04-06

## 2023-04-06 NOTE — TELEPHONE ENCOUNTER
As tolerated   Yes, she received 120 due to an error previously    Patient is supposed to be receiving 135 pills per month    I have ordered the extra 15 to her pharmacy , please let her know.     Thank you!!    Verner Freer, MD  4/6/2023  10:16 AM

## 2023-04-06 NOTE — TELEPHONE ENCOUNTER
Last Appointment:  3/24/2023  Future Appointments  4/19/2023  1:15 PM    [facility] ROOM 1            ANURAG Hernandez

## 2023-04-06 NOTE — TELEPHONE ENCOUNTER
Last Appointment:  3/24/2023  Future Appointments  4/19/2023  1:15 PM    [facility] ROOM 1            ANURAG [facility]

## 2023-04-12 ENCOUNTER — OFFICE VISIT (OUTPATIENT)
Dept: FAMILY MEDICINE CLINIC | Age: 68
End: 2023-04-12

## 2023-04-12 VITALS
TEMPERATURE: 97.5 F | RESPIRATION RATE: 16 BRPM | HEART RATE: 75 BPM | OXYGEN SATURATION: 92 % | DIASTOLIC BLOOD PRESSURE: 76 MMHG | SYSTOLIC BLOOD PRESSURE: 126 MMHG

## 2023-04-12 DIAGNOSIS — I48.11 LONGSTANDING PERSISTENT ATRIAL FIBRILLATION (HCC): ICD-10-CM

## 2023-04-12 DIAGNOSIS — E87.6 HYPOKALEMIA: ICD-10-CM

## 2023-04-12 DIAGNOSIS — D86.9 SARCOIDOSIS: ICD-10-CM

## 2023-04-12 DIAGNOSIS — G89.4 CHRONIC PAIN SYNDROME: Primary | ICD-10-CM

## 2023-04-12 DIAGNOSIS — H04.123 DRY EYES: ICD-10-CM

## 2023-04-12 DIAGNOSIS — R05.3 CHRONIC COUGH: ICD-10-CM

## 2023-04-12 RX ORDER — PROMETHAZINE HYDROCHLORIDE AND CODEINE PHOSPHATE 6.25; 1 MG/5ML; MG/5ML
5 SYRUP ORAL 4 TIMES DAILY PRN
Qty: 473 ML | Refills: 1 | Status: SHIPPED | OUTPATIENT
Start: 2023-04-12 | End: 2023-06-11

## 2023-04-12 RX ORDER — PREDNISONE 1 MG/1
5 TABLET ORAL DAILY
Qty: 30 TABLET | Refills: 5 | Status: SHIPPED | OUTPATIENT
Start: 2023-04-12

## 2023-04-12 RX ORDER — CYCLOSPORINE 0.5 MG/ML
1 EMULSION OPHTHALMIC EVERY 12 HOURS
Qty: 5.5 ML | Refills: 1 | Status: SHIPPED | OUTPATIENT
Start: 2023-04-12

## 2023-04-12 RX ORDER — DULOXETIN HYDROCHLORIDE 20 MG/1
20 CAPSULE, DELAYED RELEASE ORAL DAILY
Qty: 30 CAPSULE | Refills: 1 | Status: SHIPPED | OUTPATIENT
Start: 2023-04-12

## 2023-04-12 RX ORDER — OXYCODONE HYDROCHLORIDE AND ACETAMINOPHEN 5; 325 MG/1; MG/1
1 TABLET ORAL EVERY 4 HOURS PRN
Qty: 135 TABLET | Refills: 0 | Status: SHIPPED | OUTPATIENT
Start: 2023-04-12 | End: 2023-05-12

## 2023-04-12 ASSESSMENT — PATIENT HEALTH QUESTIONNAIRE - PHQ9
SUM OF ALL RESPONSES TO PHQ QUESTIONS 1-9: 7
SUM OF ALL RESPONSES TO PHQ QUESTIONS 1-9: 7
7. TROUBLE CONCENTRATING ON THINGS, SUCH AS READING THE NEWSPAPER OR WATCHING TELEVISION: 2
9. THOUGHTS THAT YOU WOULD BE BETTER OFF DEAD, OR OF HURTING YOURSELF: 0
5. POOR APPETITE OR OVEREATING: 0
6. FEELING BAD ABOUT YOURSELF - OR THAT YOU ARE A FAILURE OR HAVE LET YOURSELF OR YOUR FAMILY DOWN: 1
3. TROUBLE FALLING OR STAYING ASLEEP: 0
1. LITTLE INTEREST OR PLEASURE IN DOING THINGS: 1
4. FEELING TIRED OR HAVING LITTLE ENERGY: 1
SUM OF ALL RESPONSES TO PHQ QUESTIONS 1-9: 7
SUM OF ALL RESPONSES TO PHQ QUESTIONS 1-9: 7
8. MOVING OR SPEAKING SO SLOWLY THAT OTHER PEOPLE COULD HAVE NOTICED. OR THE OPPOSITE, BEING SO FIGETY OR RESTLESS THAT YOU HAVE BEEN MOVING AROUND A LOT MORE THAN USUAL: 1
SUM OF ALL RESPONSES TO PHQ9 QUESTIONS 1 & 2: 2
10. IF YOU CHECKED OFF ANY PROBLEMS, HOW DIFFICULT HAVE THESE PROBLEMS MADE IT FOR YOU TO DO YOUR WORK, TAKE CARE OF THINGS AT HOME, OR GET ALONG WITH OTHER PEOPLE: 1
2. FEELING DOWN, DEPRESSED OR HOPELESS: 1

## 2023-04-14 LAB
BILIRUBIN, URINE: NORMAL
BLOOD, URINE: NORMAL
CLARITY: NORMAL
COLOR: NORMAL
GLUCOSE URINE: NORMAL
KETONES, URINE: NORMAL
LEUKOCYTE ESTERASE, URINE: NORMAL
NITRITE, URINE: NORMAL
PH UA: NORMAL
PROTEIN UA: NORMAL
SPECIFIC GRAVITY, URINE: NORMAL
UROBILINOGEN, URINE: NORMAL

## 2023-04-19 ENCOUNTER — HOSPITAL ENCOUNTER (OUTPATIENT)
Dept: OTHER | Age: 68
Setting detail: THERAPIES SERIES
Discharge: HOME | End: 2023-04-19
Payer: COMMERCIAL

## 2023-04-19 VITALS
OXYGEN SATURATION: 94 % | SYSTOLIC BLOOD PRESSURE: 158 MMHG | WEIGHT: 157 LBS | BODY MASS INDEX: 30.66 KG/M2 | DIASTOLIC BLOOD PRESSURE: 98 MMHG | HEART RATE: 55 BPM | RESPIRATION RATE: 18 BRPM

## 2023-04-19 LAB
ANION GAP SERPL CALCULATED.3IONS-SCNC: 8 MMOL/L (ref 7–16)
BNP BLD-MCNC: 2601 PG/ML (ref 0–125)
BUN SERPL-MCNC: 30 MG/DL (ref 6–23)
CALCIUM SERPL-MCNC: 9 MG/DL (ref 8.6–10.2)
CHLORIDE SERPL-SCNC: 99 MMOL/L (ref 98–107)
CO2 SERPL-SCNC: 34 MMOL/L (ref 22–29)
CREAT SERPL-MCNC: 1 MG/DL (ref 0.5–1)
GLUCOSE SERPL-MCNC: 81 MG/DL (ref 74–99)
POTASSIUM SERPL-SCNC: 3.4 MMOL/L (ref 3.5–5)
SODIUM SERPL-SCNC: 141 MMOL/L (ref 132–146)

## 2023-04-19 PROCEDURE — 80048 BASIC METABOLIC PNL TOTAL CA: CPT

## 2023-04-19 PROCEDURE — 36415 COLL VENOUS BLD VENIPUNCTURE: CPT

## 2023-04-19 PROCEDURE — 99214 OFFICE O/P EST MOD 30 MIN: CPT

## 2023-04-19 PROCEDURE — 83880 ASSAY OF NATRIURETIC PEPTIDE: CPT

## 2023-04-19 NOTE — PROGRESS NOTES
Called and spoke with Gucci Pavon at ' [facility] Cardiology to please make Saad Nelson aware of patient's potassium of 3.4 and rise in BNP. Also asked Gucci Pavon to please let Saad Nelson know that patient skips Lasix doses.
and admits to skipping a dose here and there because of \"peeing too much\" Patient instructed to not skip doses unless told by physician. Patient's blood pressure is 158/98 and denies feeling flushed or having a head ache. Patient states she will take her evening dose of hydralazine when she gets home and monitor her blood pressure with her home cuff. Interventions completed this visit:  IV diuretics given no  Lab work obtained yes, bmp, bnp   Reviewed currently prescribed medications with patient, educated on importance of compliance and answered any questions regarding their medication  Educated on signs and symptoms of HF  Educated on low sodium diet  She received HF education on her last hospital admission. We provided her with Sodium content pamphlet. Reviewed the HF zones, low sodium diet and daily weights. Call on day 1 of onset of s/s. PLAN:  Scheduled to follow up in CHF clinic on   Next appt scheduled. Future Appointments   Date Time Provider Mauri Sanford   5/5/2023  1:00 PM [facility] CHF ROOM 1 [facility]   5/11/2023  1:40 PM Melvin Philip MD [facility]       Given clinic phone number and aware of signs and symptoms to call with any HF change in symptoms. First visit with CHF clinic today. Patient presented with . . Discussed with patient and  the purpose of CHF clinic and importance of daily weights and doing a self check every day to monitor for changes. Went over the three heart failure zones and to call cardiologist if in yellow zone immediately to prevent any further decline. Patient has a working scale. Patient is agreeable to future CHF clinic visits.

## 2023-04-19 NOTE — PLAN OF CARE
Problem: Chronic Conditions and Co-morbidities  Goal: Patient's chronic conditions and co-morbidity symptoms are monitored and maintained or improved  Flowsheets (Taken 4/19/2023 8745)  Care Plan - Patient's Chronic Conditions and Co-Morbidity Symptoms are Monitored and Maintained or Improved: Monitor and assess patient's chronic conditions and comorbid symptoms for stability, deterioration, or improvement

## 2023-04-19 NOTE — PROGRESS NOTES
medications  - cycloSPORINE (RESTASIS) 0.05 % ophthalmic emulsion; Place 1 drop into both eyes in the morning and 1 drop in the evening. Dispense: 5.5 mL; Refill: 1    5. Chronic cough  Continue with medication, consider weaning after patient has been weaned off of opioids  - promethazine-codeine (PHENERGAN WITH CODEINE) 6.25-10 MG/5ML syrup; Take 5 mLs by mouth 4 times daily as needed for Cough for up to 60 days. Dispense: 473 mL; Refill: 1    6. Hypokalemia  Repeat BMP today as patient's previous labs showed hypokalemia  Continue with home dose potassium  - Basic Metabolic Panel; Future     RTO 1 month or sooner prn for any persistent, new, or worsening symptoms. Please see Patient Instructions for further counseling and information given. Advised to please be adherent to the treatment plans discussed today, and please call with any questions or concerns, letting the office know of any reasons that the plans may not be followed. The risks of untreated conditions include worsening illness, injury, disability, and possibly, death. Please call if symptoms change in any way, worsen, or fail to completely resolve, as this could necessitate a change to treatment plans. Patient and/or caregiver expressed understanding. Indications and proper use of medication(s) reviewed. Potential side-effects and risks of medication(s) also explained. Patient and/or caregiver was instructed to call if any new symptoms develop prior to next visit. Health risk factors discussed and addressed.      Electronically signed by Ela Rodriguez MD PGY-3 on 4/19/2023 at 1:23 PM  This case was discussed with attending physician: Dr. Iris Manning

## 2023-04-20 RX ORDER — POTASSIUM CHLORIDE 20 MEQ/1
40 TABLET, EXTENDED RELEASE ORAL DAILY
Qty: 4 TABLET | Refills: 0 | Status: SHIPPED | OUTPATIENT
Start: 2023-04-20 | End: 2023-04-22

## 2023-04-21 LAB
CREAT UR-MCNC: 138 MG/DL (ref 29–226)
MICROALBUMIN UR-MCNC: 29.9 MG/L
MICROALBUMIN/CREAT UR-RTO: 21.7 (ref 0–30)
PROT UR-MCNC: 29 MG/DL (ref 0–12)
PROTEIN/CREAT RATIO: 0.2
PROTEIN/CREAT RATIO: 0.2 (ref 0–0.2)

## 2023-04-28 ENCOUNTER — HOSPITAL ENCOUNTER (OUTPATIENT)
Age: 68
Discharge: HOME | End: 2023-04-28
Payer: COMMERCIAL

## 2023-04-28 LAB
ANION GAP SERPL CALCULATED.3IONS-SCNC: 15 MMOL/L (ref 7–16)
B.E.: 7.8 MMOL/L (ref -3–3)
BUN SERPL-MCNC: 20 MG/DL (ref 6–23)
CALCIUM SERPL-MCNC: 9.9 MG/DL (ref 8.6–10.2)
CHLORIDE SERPL-SCNC: 97 MMOL/L (ref 98–107)
CO2 SERPL-SCNC: 29 MMOL/L (ref 22–29)
CREAT SERPL-MCNC: 1 MG/DL (ref 0.5–1)
DELIVERY SYSTEMS: ABNORMAL
DEVICE: ABNORMAL
GLUCOSE SERPL-MCNC: 90 MG/DL (ref 74–99)
HCO3: 34.4 MMOL/L (ref 22–26)
HEMATOCRIT: 41 % (ref 34–48)
HEMOGLOBIN: 14 G/DL (ref 11.5–15.5)
O2 SATURATION: 98.2 % (ref 92–98.5)
OPERATOR ID: 2060
PCO2 37: 55.3 MMHG (ref 35–45)
PH 37: 7.4 (ref 7.35–7.45)
PO2 37: 110.5 MMHG (ref 60–80)
POC SOURCE: ABNORMAL
POTASSIUM SERPL-SCNC: 3.7 MMOL/L (ref 3.5–5)
POTASSIUM SERPL-SCNC: 4 MMOL/L (ref 3.5–5.5)
SODIUM SERPL-SCNC: 141 MMOL/L (ref 132–146)

## 2023-04-28 PROCEDURE — 82803 BLOOD GASES ANY COMBINATION: CPT

## 2023-04-28 PROCEDURE — 80048 BASIC METABOLIC PNL TOTAL CA: CPT

## 2023-04-28 PROCEDURE — 36415 COLL VENOUS BLD VENIPUNCTURE: CPT

## 2023-05-03 DIAGNOSIS — E03.9 HYPOTHYROIDISM, UNSPECIFIED TYPE: ICD-10-CM

## 2023-05-03 RX ORDER — LEVOTHYROXINE SODIUM 0.07 MG/1
75 TABLET ORAL DAILY
Qty: 90 TABLET | Refills: 0 | Status: SHIPPED | OUTPATIENT
Start: 2023-05-03

## 2023-05-03 NOTE — TELEPHONE ENCOUNTER
Last Appointment:  4/12/2023  Future Appointments  5/5/2023   1:00 PM    [facility] CHF ROOM 1            [facility]            [facility]  5/11/2023  1:40 PM    Hildy Carrel, MD [facility]        [facility]

## 2023-05-05 ENCOUNTER — HOSPITAL ENCOUNTER (OUTPATIENT)
Dept: OTHER | Age: 68
Setting detail: THERAPIES SERIES
Discharge: HOME | End: 2023-05-05
Payer: COMMERCIAL

## 2023-05-05 VITALS
RESPIRATION RATE: 18 BRPM | HEART RATE: 72 BPM | OXYGEN SATURATION: 99 % | SYSTOLIC BLOOD PRESSURE: 130 MMHG | DIASTOLIC BLOOD PRESSURE: 69 MMHG | WEIGHT: 154 LBS | BODY MASS INDEX: 30.08 KG/M2

## 2023-05-05 LAB
ANION GAP SERPL CALCULATED.3IONS-SCNC: 7 MMOL/L (ref 7–16)
BNP BLD-MCNC: 598 PG/ML (ref 0–125)
BUN SERPL-MCNC: 27 MG/DL (ref 6–23)
CALCIUM SERPL-MCNC: 9.1 MG/DL (ref 8.6–10.2)
CHLORIDE SERPL-SCNC: 102 MMOL/L (ref 98–107)
CO2 SERPL-SCNC: 32 MMOL/L (ref 22–29)
CREAT SERPL-MCNC: 1.1 MG/DL (ref 0.5–1)
GLUCOSE SERPL-MCNC: 88 MG/DL (ref 74–99)
POTASSIUM SERPL-SCNC: 3.4 MMOL/L (ref 3.5–5)
SODIUM SERPL-SCNC: 141 MMOL/L (ref 132–146)

## 2023-05-05 PROCEDURE — 80048 BASIC METABOLIC PNL TOTAL CA: CPT

## 2023-05-05 PROCEDURE — 36415 COLL VENOUS BLD VENIPUNCTURE: CPT

## 2023-05-05 PROCEDURE — 83880 ASSAY OF NATRIURETIC PEPTIDE: CPT

## 2023-05-05 PROCEDURE — 99214 OFFICE O/P EST MOD 30 MIN: CPT

## 2023-05-05 NOTE — PROGRESS NOTES
03/07/2023 4.0   03/06/2023 4.9   03/05/2023 3.2 (L)     Chloride (mmol/L)   Date Value   04/28/2023 97 (L)   04/19/2023 99   04/03/2023 99     CO2 (mmol/L)   Date Value   04/28/2023 29   04/19/2023 34 (H)   04/03/2023 35 (H)     BUN (mg/dL)   Date Value   04/28/2023 20   04/19/2023 30 (H)   04/03/2023 39 (H)     Glucose (mg/dL)   Date Value   04/28/2023 90   04/19/2023 81   04/03/2023 91   05/02/2012 116 (H)   04/28/2012 95   04/04/2012 90     Calcium (mg/dL)   Date Value   04/28/2023 9.9   04/19/2023 9.0   04/03/2023 9.6       Last 3 BNP       Pro-BNP (pg/mL)   Date Value   04/19/2023 2,601 (H)   04/03/2023 739 (H)   03/05/2023 2,420 (H)          CBC: No results for input(s): WBC, HGB, PLT in the last 72 hours. BMP:  No results for input(s): NA, K, CL, CO2, BUN, CREATININE, GLUCOSE in the last 72 hours. Hepatic: No results for input(s): AST, ALT, ALB, BILITOT, ALKPHOS in the last 72 hours. Troponin: No results for input(s): TROPONINI in the last 72 hours. BNP: No results for input(s): BNP in the last 72 hours. Lipids: No results for input(s): CHOL, HDL in the last 72 hours. Invalid input(s): LDLCALCU  INR: No results for input(s): INR in the last 72 hours. WEIGHTS:    Wt Readings from Last 3 Encounters:   05/05/23 154 lb (69.9 kg)   04/19/23 157 lb (71.2 kg)   04/03/23 153 lb (69.4 kg)         TELEMETRY:  Cardiac Regularity: irregular  Cardiac Rhythm/Interpretation: NSR with pac's         ASSESSMENT:  Rebekah Rodriguez's WEIGHT IS 154LB , DOWN 3 LBS FROM 4/19/23. PT IS NO LONGER TAKING ORAL DIURETIC,  [address] DISCONTINUED DUE TO PT BEING DEHYDRATED. PT. TOOK LAST DOSE OF POTASSIUM ON Tuesday 5/2/23. PT DENIES ANY DISCOMFORT. UNABLE TO ASSESS JVD PT. IN [facility].  PT. CONFIRMED WILL CALL FOR EARLIER APPT. IF NEEDED.       Interventions completed this visit:  IV diuretics given no  Lab work obtained yes, bmp, bnp   Reviewed currently prescribed medications with patient, educated on importance of

## 2023-05-11 ENCOUNTER — OFFICE VISIT (OUTPATIENT)
Dept: FAMILY MEDICINE CLINIC | Age: 68
End: 2023-05-11

## 2023-05-11 VITALS
SYSTOLIC BLOOD PRESSURE: 128 MMHG | DIASTOLIC BLOOD PRESSURE: 73 MMHG | HEART RATE: 70 BPM | TEMPERATURE: 98.1 F | OXYGEN SATURATION: 98 %

## 2023-05-11 DIAGNOSIS — E03.9 HYPOTHYROIDISM, UNSPECIFIED TYPE: ICD-10-CM

## 2023-05-11 DIAGNOSIS — I10 PRIMARY HYPERTENSION: ICD-10-CM

## 2023-05-11 DIAGNOSIS — D86.9 SARCOIDOSIS: ICD-10-CM

## 2023-05-11 DIAGNOSIS — H04.123 DRY EYES: ICD-10-CM

## 2023-05-11 DIAGNOSIS — F11.90 CHRONIC, CONTINUOUS USE OF OPIOIDS: ICD-10-CM

## 2023-05-11 DIAGNOSIS — G89.4 CHRONIC PAIN SYNDROME: ICD-10-CM

## 2023-05-11 DIAGNOSIS — M17.0 OSTEOARTHRITIS OF BOTH KNEES, UNSPECIFIED OSTEOARTHRITIS TYPE: ICD-10-CM

## 2023-05-11 DIAGNOSIS — J96.11 CHRONIC RESPIRATORY FAILURE WITH HYPOXIA (HCC): Primary | Chronic | ICD-10-CM

## 2023-05-11 DIAGNOSIS — R32 URINARY INCONTINENCE, UNSPECIFIED TYPE: ICD-10-CM

## 2023-05-11 DIAGNOSIS — R05.3 CHRONIC COUGH: ICD-10-CM

## 2023-05-11 DIAGNOSIS — I48.0 PAF (PAROXYSMAL ATRIAL FIBRILLATION) (HCC): ICD-10-CM

## 2023-05-11 DIAGNOSIS — E61.1 IRON DEFICIENCY: ICD-10-CM

## 2023-05-11 DIAGNOSIS — K59.04 CHRONIC IDIOPATHIC CONSTIPATION: ICD-10-CM

## 2023-05-11 DIAGNOSIS — K21.9 GASTROESOPHAGEAL REFLUX DISEASE, UNSPECIFIED WHETHER ESOPHAGITIS PRESENT: ICD-10-CM

## 2023-05-11 RX ORDER — POTASSIUM CHLORIDE 20 MEQ/1
40 TABLET, EXTENDED RELEASE ORAL DAILY
Qty: 4 TABLET | Refills: 0 | Status: CANCELLED | OUTPATIENT
Start: 2023-05-11 | End: 2023-05-13

## 2023-05-11 RX ORDER — POTASSIUM CHLORIDE 1500 MG/1
20 TABLET, FILM COATED, EXTENDED RELEASE ORAL
Qty: 60 TABLET | Status: CANCELLED | OUTPATIENT
Start: 2023-05-12

## 2023-05-11 RX ORDER — PREDNISONE 1 MG/1
5 TABLET ORAL DAILY
Qty: 30 TABLET | Refills: 5 | Status: SHIPPED | OUTPATIENT
Start: 2023-05-11

## 2023-05-11 RX ORDER — FAMOTIDINE 20 MG/1
20 TABLET, FILM COATED ORAL 2 TIMES DAILY
Qty: 60 TABLET | Refills: 0 | Status: SHIPPED | OUTPATIENT
Start: 2023-05-11

## 2023-05-11 RX ORDER — FLUTICASONE FUROATE AND VILANTEROL TRIFENATATE 100; 25 UG/1; UG/1
POWDER RESPIRATORY (INHALATION)
Qty: 60 EACH | Refills: 0 | Status: SHIPPED | OUTPATIENT
Start: 2023-05-11

## 2023-05-11 RX ORDER — OMEGA-3-ACID ETHYL ESTERS 1 G/1
1 CAPSULE, LIQUID FILLED ORAL 2 TIMES DAILY
Qty: 180 CAPSULE | Refills: 0 | Status: CANCELLED | OUTPATIENT
Start: 2023-05-11

## 2023-05-11 RX ORDER — DESMOPRESSIN ACETATE 0.1 MG/1
0.2 TABLET ORAL 2 TIMES DAILY
Qty: 120 TABLET | Refills: 2 | Status: SHIPPED | OUTPATIENT
Start: 2023-05-11 | End: 2023-08-09

## 2023-05-11 RX ORDER — LEVOTHYROXINE SODIUM 0.07 MG/1
75 TABLET ORAL DAILY
Qty: 90 TABLET | Refills: 0 | Status: SHIPPED | OUTPATIENT
Start: 2023-05-11

## 2023-05-11 RX ORDER — CYCLOSPORINE 0.5 MG/ML
1 EMULSION OPHTHALMIC EVERY 12 HOURS
Qty: 5.5 ML | Refills: 1 | Status: SHIPPED | OUTPATIENT
Start: 2023-05-11

## 2023-05-11 RX ORDER — OXYCODONE HYDROCHLORIDE AND ACETAMINOPHEN 5; 325 MG/1; MG/1
1 TABLET ORAL EVERY 4 HOURS PRN
Qty: 120 TABLET | Refills: 0 | Status: SHIPPED | OUTPATIENT
Start: 2023-05-11 | End: 2023-06-10

## 2023-05-11 RX ORDER — TRIAMCINOLONE ACETONIDE 40 MG/ML
40 INJECTION, SUSPENSION INTRA-ARTICULAR; INTRAMUSCULAR ONCE
Status: COMPLETED | OUTPATIENT
Start: 2023-05-11 | End: 2023-05-11

## 2023-05-11 RX ORDER — NALOXONE HYDROCHLORIDE 4 MG/.1ML
1 SPRAY NASAL PRN
Qty: 1 EACH | Refills: 2 | Status: SHIPPED | OUTPATIENT
Start: 2023-05-11

## 2023-05-11 RX ORDER — DULOXETIN HYDROCHLORIDE 60 MG/1
60 CAPSULE, DELAYED RELEASE ORAL DAILY
Qty: 30 CAPSULE | Refills: 1 | Status: SHIPPED | OUTPATIENT
Start: 2023-05-11

## 2023-05-11 RX ORDER — ALBUTEROL SULFATE 2.5 MG/3ML
2.5 SOLUTION RESPIRATORY (INHALATION) EVERY 6 HOURS PRN
Qty: 120 EACH | Refills: 2 | Status: SHIPPED | OUTPATIENT
Start: 2023-05-11

## 2023-05-11 RX ORDER — HYDRALAZINE HYDROCHLORIDE 10 MG/1
10 TABLET, FILM COATED ORAL 2 TIMES DAILY
Qty: 90 TABLET | Refills: 3 | Status: SHIPPED | OUTPATIENT
Start: 2023-05-11

## 2023-05-11 RX ORDER — ECHINACEA PURPUREA EXTRACT 125 MG
1 TABLET ORAL PRN
Qty: 1 EACH | Status: CANCELLED | OUTPATIENT
Start: 2023-05-11

## 2023-05-11 RX ORDER — PROMETHAZINE HYDROCHLORIDE AND CODEINE PHOSPHATE 6.25; 1 MG/5ML; MG/5ML
5 SYRUP ORAL 4 TIMES DAILY PRN
Qty: 473 ML | Refills: 1 | Status: SHIPPED | OUTPATIENT
Start: 2023-05-11 | End: 2023-07-10

## 2023-05-11 RX ORDER — DOCUSATE SODIUM 100 MG/1
100 CAPSULE, LIQUID FILLED ORAL 2 TIMES DAILY
Qty: 60 CAPSULE | Refills: 0 | Status: SHIPPED | OUTPATIENT
Start: 2023-05-11

## 2023-05-11 RX ORDER — FERROUS SULFATE 325(65) MG
325 TABLET ORAL 2 TIMES DAILY
Qty: 60 TABLET | Refills: 5 | Status: SHIPPED | OUTPATIENT
Start: 2023-05-11

## 2023-05-11 RX ORDER — LIDOCAINE HYDROCHLORIDE 10 MG/ML
10 INJECTION, SOLUTION INFILTRATION; PERINEURAL ONCE
Status: COMPLETED | OUTPATIENT
Start: 2023-05-11 | End: 2023-05-11

## 2023-05-11 RX ORDER — METOPROLOL SUCCINATE 50 MG/1
150 TABLET, EXTENDED RELEASE ORAL DAILY
Qty: 30 TABLET | Refills: 3 | Status: SHIPPED | OUTPATIENT
Start: 2023-05-11

## 2023-05-11 RX ORDER — ALBUTEROL SULFATE 90 UG/1
2 AEROSOL, METERED RESPIRATORY (INHALATION) EVERY 6 HOURS PRN
Qty: 1 EACH | Refills: 5 | Status: SHIPPED | OUTPATIENT
Start: 2023-05-11

## 2023-05-11 RX ADMIN — LIDOCAINE HYDROCHLORIDE 10 ML: 10 INJECTION, SOLUTION INFILTRATION; PERINEURAL at 16:30

## 2023-05-11 RX ADMIN — TRIAMCINOLONE ACETONIDE 40 MG: 40 INJECTION, SUSPENSION INTRA-ARTICULAR; INTRAMUSCULAR at 16:35

## 2023-05-11 RX ADMIN — TRIAMCINOLONE ACETONIDE 40 MG: 40 INJECTION, SUSPENSION INTRA-ARTICULAR; INTRAMUSCULAR at 16:40

## 2023-05-11 NOTE — PROGRESS NOTES
This is a 79-year-old female with past medical history of hypertension, A-fib, not on Eliquis, sarcoidosis who presents for general follow-up. Sarcoidosis/history of pulmonary fibrosis  History of COPD  Only on prednisone 5 mg daily  Presently between 3 to 6 L of oxygen daily at baseline  No changes, denies any recent shortness of breath, wheezing or increased sputum production    Hypertension  Medications include hydralazine, metoprolol, Cardizem  Patient tolerating well, blood pressure well controlled today  Denies any dizziness or blurry vision  Does complain of headaches today    Tension type headache  Patient with tension headache presenting over the past few days  States previously her blood pressure was mildly elevated with systolic in 825J, better controlled today  She has trialed Tylenol with minimal improvement, though adjusted to taking Tylenol today  Denies any blurry vision or dizziness    Chronic pain syndrome  Managed with Percocet  She is currently on 135 tablets/month, amenable to decreasing to 120 tablets today  Denies any worsening use of the medication, has been started on Cymbalta for adequate control of pain and osteoarthritis as well which she states is helping somewhat  Is amenable to increasing Cymbalta dose today    History of diabetes insipidus  Continued on DDAVP 0.1 mg tabs 2 times daily  Denies any side effects or concerns    History of hypothyroidism  Presently on Synthroid 75 mcg  TSH low, patient due for repeat today denies any worsening constipation, denies any worsening hair loss or dry skin    No other concerns today    Blood pressure 128/73, pulse 70, temperature 98.1 °F (36.7 °C), temperature source Temporal, SpO2 98 %, not currently breastfeeding. Heart regular    Lungs clear on chronic O2    abd non-tender abdominal wall hernia present; chronic     no edema   MSK: global patellar pain noted bilaterally on exam   pulses intact     Assessment/plan: 1. Hyper
disability, and possibly, death. Please call if symptoms change in any way, worsen, or fail to completely resolve, as this could necessitate a change to treatment plans. Patient and/or caregiver expressed understanding. Indications and proper use of medication(s) reviewed. Potential side-effects and risks of medication(s) also explained. Patient and/or caregiver was instructed to call if any new symptoms develop prior to next visit. Health risk factors discussed and addressed.      Electronically signed by Mars Rizvi MD PGY-3 on 5/11/2023 at 1:56 PM  This case was discussed with attending physician: Dr. Bhavesh Reyna

## 2023-05-16 PROBLEM — J96.02 ACUTE RESPIRATORY FAILURE WITH HYPOXIA AND HYPERCAPNIA (HCC): Status: ACTIVE | Noted: 2023-05-16

## 2023-05-16 PROBLEM — K56.609 BOWEL OBSTRUCTION (HCC): Status: ACTIVE | Noted: 2023-05-16

## 2023-05-16 PROBLEM — J96.01 ACUTE RESPIRATORY FAILURE WITH HYPOXIA AND HYPERCAPNIA (HCC): Status: ACTIVE | Noted: 2023-05-16

## 2023-05-18 PROBLEM — J96.21 ACUTE ON CHRONIC RESPIRATORY FAILURE WITH HYPOXIA AND HYPERCAPNIA (HCC): Status: ACTIVE | Noted: 2023-05-18

## 2023-05-18 PROBLEM — J96.22 ACUTE ON CHRONIC RESPIRATORY FAILURE WITH HYPOXIA AND HYPERCAPNIA (HCC): Status: ACTIVE | Noted: 2023-05-18

## 2023-05-19 PROBLEM — R56.9 SEIZURE (HCC): Status: ACTIVE | Noted: 2023-05-19

## 2023-05-20 PROBLEM — R77.8 ELEVATED TROPONIN: Status: ACTIVE | Noted: 2023-05-20

## 2023-05-20 PROBLEM — R79.89 ELEVATED TROPONIN: Status: ACTIVE | Noted: 2023-05-20

## 2023-05-21 PROBLEM — G93.40 ACUTE ENCEPHALOPATHY: Status: ACTIVE | Noted: 2023-05-21

## 2023-06-01 PROBLEM — I50.810 RVF (RIGHT VENTRICULAR FAILURE) (HCC): Status: ACTIVE | Noted: 2023-06-01

## 2023-06-01 PROBLEM — I50.33 ACUTE ON CHRONIC HEART FAILURE WITH PRESERVED EJECTION FRACTION (HCC): Status: ACTIVE | Noted: 2023-06-01

## 2023-06-08 ENCOUNTER — HOSPITAL ENCOUNTER (OUTPATIENT)
Dept: OTHER | Age: 68
Setting detail: THERAPIES SERIES
Discharge: HOME | End: 2023-06-08

## 2023-06-10 PROBLEM — J96.01 ACUTE RESPIRATORY FAILURE WITH HYPOXIA (HCC): Status: ACTIVE | Noted: 2023-06-10

## 2023-06-14 ENCOUNTER — APPOINTMENT (OUTPATIENT)
Dept: GENERAL RADIOLOGY | Age: 68
DRG: 291 | End: 2023-06-14
Payer: MEDICARE

## 2023-06-14 ENCOUNTER — TELEPHONE (OUTPATIENT)
Dept: OTHER | Facility: CLINIC | Age: 68
End: 2023-06-14

## 2023-06-14 ENCOUNTER — HOSPITAL ENCOUNTER (INPATIENT)
Age: 68
LOS: 3 days | Discharge: NURSING, REHAB OR LONG-TERM CARE | DRG: 291 | End: 2023-06-17
Attending: EMERGENCY MEDICINE | Admitting: STUDENT IN AN ORGANIZED HEALTH CARE EDUCATION/TRAINING PROGRAM
Payer: MEDICARE

## 2023-06-14 ENCOUNTER — APPOINTMENT (OUTPATIENT)
Dept: CT IMAGING | Age: 68
DRG: 291 | End: 2023-06-14
Payer: MEDICARE

## 2023-06-14 DIAGNOSIS — J44.1 COPD EXACERBATION (HCC): ICD-10-CM

## 2023-06-14 DIAGNOSIS — R07.9 CHEST PAIN, UNSPECIFIED TYPE: Primary | ICD-10-CM

## 2023-06-14 DIAGNOSIS — I50.9 ACUTE CONGESTIVE HEART FAILURE, UNSPECIFIED HEART FAILURE TYPE (HCC): ICD-10-CM

## 2023-06-14 PROBLEM — J96.10 CHRONIC RESPIRATORY FAILURE, UNSP W HYPOXIA OR HYPERCAPNIA (HCC): Status: ACTIVE | Noted: 2023-06-14

## 2023-06-14 PROBLEM — I50.43 ACUTE ON CHRONIC COMBINED SYSTOLIC AND DIASTOLIC CHF (CONGESTIVE HEART FAILURE) (HCC): Status: ACTIVE | Noted: 2023-06-14

## 2023-06-14 LAB
ALBUMIN SERPL-MCNC: 3.8 G/DL (ref 3.5–5.2)
ALP SERPL-CCNC: 107 U/L (ref 35–104)
ALT SERPL-CCNC: 13 U/L (ref 0–32)
ANION GAP SERPL CALCULATED.3IONS-SCNC: 8 MMOL/L (ref 7–16)
AST SERPL-CCNC: 25 U/L (ref 0–31)
B PARAP IS1001 DNA NPH QL NAA+NON-PROBE: NOT DETECTED
B PERT.PT PRMT NPH QL NAA+NON-PROBE: NOT DETECTED
BILIRUB SERPL-MCNC: 0.6 MG/DL (ref 0–1.2)
BILIRUB UR QL STRIP: NEGATIVE
BNP BLD-MCNC: 6163 PG/ML (ref 0–125)
BUN SERPL-MCNC: 13 MG/DL (ref 6–23)
C PNEUM DNA NPH QL NAA+NON-PROBE: NOT DETECTED
CALCIUM SERPL-MCNC: 9.2 MG/DL (ref 8.6–10.2)
CHLORIDE SERPL-SCNC: 100 MMOL/L (ref 98–107)
CLARITY UR: CLEAR
CO2 SERPL-SCNC: 34 MMOL/L (ref 22–29)
COLOR UR: YELLOW
CREAT SERPL-MCNC: 1 MG/DL (ref 0.5–1)
D DIMER: 1815 NG/ML DDU
ERYTHROCYTE [DISTWIDTH] IN BLOOD BY AUTOMATED COUNT: 17.4 FL (ref 11.5–15)
FLUAV RNA NPH QL NAA+NON-PROBE: NOT DETECTED
FLUBV RNA NPH QL NAA+NON-PROBE: NOT DETECTED
GLUCOSE SERPL-MCNC: 76 MG/DL (ref 74–99)
GLUCOSE UR STRIP-MCNC: NEGATIVE MG/DL
HADV DNA NPH QL NAA+NON-PROBE: NOT DETECTED
HCOV 229E RNA NPH QL NAA+NON-PROBE: NOT DETECTED
HCOV HKU1 RNA NPH QL NAA+NON-PROBE: NOT DETECTED
HCOV NL63 RNA NPH QL NAA+NON-PROBE: NOT DETECTED
HCOV OC43 RNA NPH QL NAA+NON-PROBE: NOT DETECTED
HCT VFR BLD AUTO: 28.6 % (ref 34–48)
HGB BLD-MCNC: 8.5 G/DL (ref 11.5–15.5)
HGB UR QL STRIP: NEGATIVE
HMPV RNA NPH QL NAA+NON-PROBE: NOT DETECTED
HPIV1 RNA NPH QL NAA+NON-PROBE: NOT DETECTED
HPIV2 RNA NPH QL NAA+NON-PROBE: NOT DETECTED
HPIV3 RNA NPH QL NAA+NON-PROBE: NOT DETECTED
HPIV4 RNA NPH QL NAA+NON-PROBE: NOT DETECTED
KETONES UR STRIP-MCNC: NEGATIVE MG/DL
LACTATE BLDV-SCNC: 0.7 MMOL/L (ref 0.5–2.2)
LEUKOCYTE ESTERASE UR QL STRIP: NEGATIVE
M PNEUMO DNA NPH QL NAA+NON-PROBE: NOT DETECTED
MCH RBC QN AUTO: 27.2 PG (ref 26–35)
MCHC RBC AUTO-ENTMCNC: 29.7 % (ref 32–34.5)
MCV RBC AUTO: 91.4 FL (ref 80–99.9)
NITRITE UR QL STRIP: NEGATIVE
PH UR STRIP: 7.5 [PH] (ref 5–9)
PLATELET # BLD AUTO: 240 E9/L (ref 130–450)
PMV BLD AUTO: 10.1 FL (ref 7–12)
POTASSIUM SERPL-SCNC: 3.9 MMOL/L (ref 3.5–5)
PROCALCITONIN: 0.08 NG/ML (ref 0–0.08)
PROT SERPL-MCNC: 6.6 G/DL (ref 6.4–8.3)
PROT UR STRIP-MCNC: NEGATIVE MG/DL
RBC # BLD AUTO: 3.13 E12/L (ref 3.5–5.5)
RSV RNA NPH QL NAA+NON-PROBE: NOT DETECTED
RV+EV RNA NPH QL NAA+NON-PROBE: NOT DETECTED
SARS-COV-2 RNA NPH QL NAA+NON-PROBE: NOT DETECTED
SODIUM SERPL-SCNC: 142 MMOL/L (ref 132–146)
SP GR UR STRIP: 1.01 (ref 1–1.03)
TROPONIN, HIGH SENSITIVITY: 21 NG/L (ref 0–9)
TROPONIN, HIGH SENSITIVITY: 23 NG/L (ref 0–9)
UROBILINOGEN UR STRIP-ACNC: 0.2 E.U./DL
WBC # BLD: 7.4 E9/L (ref 4.5–11.5)

## 2023-06-14 PROCEDURE — 71275 CT ANGIOGRAPHY CHEST: CPT

## 2023-06-14 PROCEDURE — APPSS45 APP SPLIT SHARED TIME 31-45 MINUTES

## 2023-06-14 PROCEDURE — 85378 FIBRIN DEGRADE SEMIQUANT: CPT

## 2023-06-14 PROCEDURE — 99285 EMERGENCY DEPT VISIT HI MDM: CPT

## 2023-06-14 PROCEDURE — 81003 URINALYSIS AUTO W/O SCOPE: CPT

## 2023-06-14 PROCEDURE — 6360000004 HC RX CONTRAST MEDICATION: Performed by: RADIOLOGY

## 2023-06-14 PROCEDURE — 87088 URINE BACTERIA CULTURE: CPT

## 2023-06-14 PROCEDURE — 84145 PROCALCITONIN (PCT): CPT

## 2023-06-14 PROCEDURE — 71046 X-RAY EXAM CHEST 2 VIEWS: CPT

## 2023-06-14 PROCEDURE — 80053 COMPREHEN METABOLIC PANEL: CPT

## 2023-06-14 PROCEDURE — 85027 COMPLETE CBC AUTOMATED: CPT

## 2023-06-14 PROCEDURE — 93005 ELECTROCARDIOGRAM TRACING: CPT | Performed by: EMERGENCY MEDICINE

## 2023-06-14 PROCEDURE — 94660 CPAP INITIATION&MGMT: CPT

## 2023-06-14 PROCEDURE — 87040 BLOOD CULTURE FOR BACTERIA: CPT

## 2023-06-14 PROCEDURE — 1200000000 HC SEMI PRIVATE

## 2023-06-14 PROCEDURE — 0202U NFCT DS 22 TRGT SARS-COV-2: CPT

## 2023-06-14 PROCEDURE — 84484 ASSAY OF TROPONIN QUANT: CPT

## 2023-06-14 PROCEDURE — 83605 ASSAY OF LACTIC ACID: CPT

## 2023-06-14 PROCEDURE — 83880 ASSAY OF NATRIURETIC PEPTIDE: CPT

## 2023-06-14 PROCEDURE — 87077 CULTURE AEROBIC IDENTIFY: CPT

## 2023-06-14 PROCEDURE — 87186 SC STD MICRODIL/AGAR DIL: CPT

## 2023-06-14 RX ORDER — FUROSEMIDE 10 MG/ML
20 INJECTION INTRAMUSCULAR; INTRAVENOUS ONCE
Status: COMPLETED | OUTPATIENT
Start: 2023-06-14 | End: 2023-06-15

## 2023-06-14 RX ORDER — ACETAMINOPHEN 500 MG
1000 TABLET ORAL ONCE
Status: COMPLETED | OUTPATIENT
Start: 2023-06-14 | End: 2023-06-15

## 2023-06-14 RX ADMIN — IOPAMIDOL 75 ML: 755 INJECTION, SOLUTION INTRAVENOUS at 18:43

## 2023-06-14 ASSESSMENT — PAIN SCALES - GENERAL: PAINLEVEL_OUTOF10: 10

## 2023-06-14 ASSESSMENT — PAIN - FUNCTIONAL ASSESSMENT: PAIN_FUNCTIONAL_ASSESSMENT: 0-10

## 2023-06-14 ASSESSMENT — PAIN DESCRIPTION - ORIENTATION: ORIENTATION: LEFT

## 2023-06-14 ASSESSMENT — PAIN DESCRIPTION - LOCATION: LOCATION: CHEST

## 2023-06-14 NOTE — PROGRESS NOTES
Date: 6/14/2023    Time: 4:46 PM    Patient Placed On BIPAP/CPAP/ Non-Invasive Ventilation? Yes    If no must comment. Facial area red/color change? No           If YES are Blister/Lesion present? No   If yes must notify nursing staff  BIPAP/CPAP skin barrier?   Yes    Skin barrier type:mepilexlite       Comments:     06/14/23 5651   NIV Type   $NIV $Daily Charge   Skin Assessment Clean, dry, & intact   Skin Protection for O2 Device Yes   Orientation Middle   Location Nose   Intervention(s) Skin Barrier   Suction Setup and Functional Yes   NIV Started/Stopped On   Equipment Type v60   Mode Bilevel   Mask Type Full face mask   Mask Size Small   Settings/Measurements   PIP Observed 12 cm H20   IPAP 12 cmH20   CPAP/EPAP 6 cmH2O   Vt (Measured) 397 mL   Rate Ordered 14   Respirations 22   Insp Rise Time (%) 3 %   FiO2  40 %   I Time/ I Time % 0.9 s   Minute Volume (L/min) 5.7 Liters   Mask Leak (lpm) 20 lpm   Comfort Level Good   Using Accessory Muscles No   SpO2 100   Patient's Home Machine No   Alarm Settings   Alarms On Y   Low Pressure (cmH2O) 8 cmH2O   High Pressure (cmH2O) 30 cmH2O   Apnea (secs) 20 secs   RR Low (bpm) 12   RR High (bpm) 50 br/min   Patient Observation   Observations red outlet   Oxygen Therapy/Pulse Ox   Pulse 73           Naylei Eldridge RCP

## 2023-06-14 NOTE — ED PROVIDER NOTES
HPI:  6/14/23,   Time: 12:44 PM EDT       Harlon Barthel is a 78 y.o. female presenting to the ED for shortness of breath episodic chest pain. Patient was recently discharged from the hospital today to a rehab facility has a history COPD on oxygen. She also has history of heart failure. Patient was diuresed and back to her baseline BNP she was discharged today for rehabilitation services. Patient has a history of paroxysmal atrial fibrillation and chronic anemia. She does suffer from stage III kidney disease. Patient stated she got short of breath upon arrival to the facility. Complains of chest pain. She said it is sharp stabbing nonradiating., beginning several minutes ago. The complaint has been persistent, mild in severity, and worsened by nothing.   ***    Review of Systems:   Pertinent positives and negatives are stated within HPI, all other systems reviewed and are negative.    --------------------------------------------- PAST HISTORY ---------------------------------------------  Past Medical History:  has a past medical history of A-fib ([address] Abnormal mammogram, Acute on chronic respiratory failure ([address] Anemia, Anemia due to chronic illness, Ankle fracture, left, 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[address] Backache, Benign hypertension, CHF (congestive heart failure) ([address] Chronic back pain, Chronic kidney disease, Chronic pain disorder, Chronic, continuous use of opioids, Compression fracture of thoracic vertebra ([address] COPD (chronic obstructive pulmonary disease) ([address] Debility, Deformity of ankle and foot, acquired, Depression, Diabetes insipidus ([address] Diverticulitis, Dry eyes, Encephalopathy acute, Gait disturbance, GERD (gastroesophageal reflux disease), Hemorrhoids, Hernia, History of blood transfusion, History of Clostridium difficile, Hyperlipidemia, Hypothyroidism, Ischemic colitis, enteritis, or enterocolitis ([address] Long term (current) use of systemic steroids, Long-term

## 2023-06-15 ENCOUNTER — APPOINTMENT (OUTPATIENT)
Dept: NUCLEAR MEDICINE | Age: 68
DRG: 291 | End: 2023-06-15
Payer: MEDICARE

## 2023-06-15 PROBLEM — E44.0 MODERATE PROTEIN-CALORIE MALNUTRITION (HCC): Status: ACTIVE | Noted: 2023-06-15

## 2023-06-15 LAB
ANION GAP SERPL CALCULATED.3IONS-SCNC: 11 MMOL/L (ref 7–16)
BUN SERPL-MCNC: 13 MG/DL (ref 6–23)
CALCIUM SERPL-MCNC: 9 MG/DL (ref 8.6–10.2)
CHLORIDE SERPL-SCNC: 99 MMOL/L (ref 98–107)
CO2 SERPL-SCNC: 33 MMOL/L (ref 22–29)
CREAT SERPL-MCNC: 1.2 MG/DL (ref 0.5–1)
CRP SERPL HS-MCNC: 0.7 MG/DL (ref 0–0.4)
ERYTHROCYTE [SEDIMENTATION RATE] IN BLOOD BY WESTERGREN METHOD: 9 MM/HR (ref 0–20)
GLUCOSE SERPL-MCNC: 84 MG/DL (ref 74–99)
IRON SATN MFR SERPL: 40 % (ref 15–50)
IRON SERPL-MCNC: 79 MCG/DL (ref 37–145)
MAGNESIUM SERPL-MCNC: 2.1 MG/DL (ref 1.6–2.6)
PHOSPHATE SERPL-MCNC: 3.2 MG/DL (ref 2.5–4.5)
POTASSIUM SERPL-SCNC: 3.3 MMOL/L (ref 3.5–5)
SODIUM SERPL-SCNC: 143 MMOL/L (ref 132–146)
T3FREE SERPL-MCNC: 1.4 PG/ML (ref 2–4.4)
T4 FREE SERPL-MCNC: 1.96 NG/DL (ref 0.93–1.7)
T4 FREE SERPL-MCNC: 2.04 NG/DL (ref 0.93–1.7)
TIBC SERPL-MCNC: 198 MCG/DL (ref 250–450)
TSH SERPL-MCNC: 1.16 UIU/ML (ref 0.27–4.2)

## 2023-06-15 PROCEDURE — 2700000000 HC OXYGEN THERAPY PER DAY

## 2023-06-15 PROCEDURE — A9540 TC99M MAA: HCPCS | Performed by: RADIOLOGY

## 2023-06-15 PROCEDURE — 86038 ANTINUCLEAR ANTIBODIES: CPT

## 2023-06-15 PROCEDURE — 80048 BASIC METABOLIC PNL TOTAL CA: CPT

## 2023-06-15 PROCEDURE — 83540 ASSAY OF IRON: CPT

## 2023-06-15 PROCEDURE — 83735 ASSAY OF MAGNESIUM: CPT

## 2023-06-15 PROCEDURE — 84481 FREE ASSAY (FT-3): CPT

## 2023-06-15 PROCEDURE — 3430000000 HC RX DIAGNOSTIC RADIOPHARMACEUTICAL: Performed by: RADIOLOGY

## 2023-06-15 PROCEDURE — 84100 ASSAY OF PHOSPHORUS: CPT

## 2023-06-15 PROCEDURE — 6360000002 HC RX W HCPCS

## 2023-06-15 PROCEDURE — 94660 CPAP INITIATION&MGMT: CPT

## 2023-06-15 PROCEDURE — 82787 IGG 1 2 3 OR 4 EACH: CPT

## 2023-06-15 PROCEDURE — 83516 IMMUNOASSAY NONANTIBODY: CPT

## 2023-06-15 PROCEDURE — 84443 ASSAY THYROID STIM HORMONE: CPT

## 2023-06-15 PROCEDURE — 94640 AIRWAY INHALATION TREATMENT: CPT

## 2023-06-15 PROCEDURE — A9539 TC99M PENTETATE: HCPCS | Performed by: RADIOLOGY

## 2023-06-15 PROCEDURE — 2580000003 HC RX 258

## 2023-06-15 PROCEDURE — 78582 LUNG VENTILAT&PERFUS IMAGING: CPT

## 2023-06-15 PROCEDURE — 36415 COLL VENOUS BLD VENIPUNCTURE: CPT

## 2023-06-15 PROCEDURE — 1200000000 HC SEMI PRIVATE

## 2023-06-15 PROCEDURE — 84439 ASSAY OF FREE THYROXINE: CPT

## 2023-06-15 PROCEDURE — 99222 1ST HOSP IP/OBS MODERATE 55: CPT | Performed by: STUDENT IN AN ORGANIZED HEALTH CARE EDUCATION/TRAINING PROGRAM

## 2023-06-15 PROCEDURE — 82784 ASSAY IGA/IGD/IGG/IGM EACH: CPT

## 2023-06-15 PROCEDURE — 6370000000 HC RX 637 (ALT 250 FOR IP): Performed by: STUDENT IN AN ORGANIZED HEALTH CARE EDUCATION/TRAINING PROGRAM

## 2023-06-15 PROCEDURE — 86140 C-REACTIVE PROTEIN: CPT

## 2023-06-15 PROCEDURE — 85651 RBC SED RATE NONAUTOMATED: CPT

## 2023-06-15 PROCEDURE — 83550 IRON BINDING TEST: CPT

## 2023-06-15 PROCEDURE — 6370000000 HC RX 637 (ALT 250 FOR IP)

## 2023-06-15 PROCEDURE — 6360000002 HC RX W HCPCS: Performed by: STUDENT IN AN ORGANIZED HEALTH CARE EDUCATION/TRAINING PROGRAM

## 2023-06-15 RX ORDER — BISACODYL 10 MG
10 SUPPOSITORY, RECTAL RECTAL
Status: DISCONTINUED | OUTPATIENT
Start: 2023-06-15 | End: 2023-06-17 | Stop reason: HOSPADM

## 2023-06-15 RX ORDER — DESMOPRESSIN ACETATE 0.1 MG/1
200 TABLET ORAL 2 TIMES DAILY
Status: DISCONTINUED | OUTPATIENT
Start: 2023-06-15 | End: 2023-06-17 | Stop reason: HOSPADM

## 2023-06-15 RX ORDER — IPRATROPIUM BROMIDE AND ALBUTEROL SULFATE 2.5; .5 MG/3ML; MG/3ML
1 SOLUTION RESPIRATORY (INHALATION) EVERY 4 HOURS PRN
Status: DISCONTINUED | OUTPATIENT
Start: 2023-06-15 | End: 2023-06-17 | Stop reason: HOSPADM

## 2023-06-15 RX ORDER — HYDROXYZINE PAMOATE 25 MG/1
25 CAPSULE ORAL 3 TIMES DAILY PRN
Status: DISCONTINUED | OUTPATIENT
Start: 2023-06-15 | End: 2023-06-17 | Stop reason: HOSPADM

## 2023-06-15 RX ORDER — ENOXAPARIN SODIUM 100 MG/ML
40 INJECTION SUBCUTANEOUS DAILY
Status: DISCONTINUED | OUTPATIENT
Start: 2023-06-15 | End: 2023-06-17 | Stop reason: HOSPADM

## 2023-06-15 RX ORDER — FUROSEMIDE 10 MG/ML
40 INJECTION INTRAMUSCULAR; INTRAVENOUS 2 TIMES DAILY
Status: DISCONTINUED | OUTPATIENT
Start: 2023-06-15 | End: 2023-06-17 | Stop reason: HOSPADM

## 2023-06-15 RX ORDER — FUROSEMIDE 10 MG/ML
20 INJECTION INTRAMUSCULAR; INTRAVENOUS ONCE
Status: DISCONTINUED | OUTPATIENT
Start: 2023-06-15 | End: 2023-06-17 | Stop reason: HOSPADM

## 2023-06-15 RX ORDER — SODIUM CHLORIDE 9 MG/ML
INJECTION, SOLUTION INTRAVENOUS PRN
Status: DISCONTINUED | OUTPATIENT
Start: 2023-06-15 | End: 2023-06-17 | Stop reason: HOSPADM

## 2023-06-15 RX ORDER — ACETAMINOPHEN 325 MG/1
650 TABLET ORAL EVERY 6 HOURS PRN
Status: DISCONTINUED | OUTPATIENT
Start: 2023-06-15 | End: 2023-06-17 | Stop reason: HOSPADM

## 2023-06-15 RX ORDER — LEVOTHYROXINE SODIUM 0.07 MG/1
75 TABLET ORAL DAILY
Status: DISCONTINUED | OUTPATIENT
Start: 2023-06-15 | End: 2023-06-17 | Stop reason: HOSPADM

## 2023-06-15 RX ORDER — DULOXETIN HYDROCHLORIDE 60 MG/1
60 CAPSULE, DELAYED RELEASE ORAL DAILY
Status: DISCONTINUED | OUTPATIENT
Start: 2023-06-15 | End: 2023-06-17 | Stop reason: HOSPADM

## 2023-06-15 RX ORDER — KIT FOR THE PREPARATION OF TECHNETIUM TC 99M PENTETATE 20 MG/1
30 INJECTION, POWDER, LYOPHILIZED, FOR SOLUTION INTRAVENOUS; RESPIRATORY (INHALATION)
Status: COMPLETED | OUTPATIENT
Start: 2023-06-15 | End: 2023-06-15

## 2023-06-15 RX ORDER — SODIUM CHLORIDE 0.9 % (FLUSH) 0.9 %
5-40 SYRINGE (ML) INJECTION PRN
Status: DISCONTINUED | OUTPATIENT
Start: 2023-06-15 | End: 2023-06-17 | Stop reason: HOSPADM

## 2023-06-15 RX ORDER — DOCUSATE SODIUM 100 MG/1
100 CAPSULE, LIQUID FILLED ORAL 2 TIMES DAILY
Status: DISCONTINUED | OUTPATIENT
Start: 2023-06-15 | End: 2023-06-17 | Stop reason: HOSPADM

## 2023-06-15 RX ORDER — ONDANSETRON 2 MG/ML
4 INJECTION INTRAMUSCULAR; INTRAVENOUS EVERY 6 HOURS PRN
Status: DISCONTINUED | OUTPATIENT
Start: 2023-06-15 | End: 2023-06-17 | Stop reason: HOSPADM

## 2023-06-15 RX ORDER — BUDESONIDE AND FORMOTEROL FUMARATE DIHYDRATE 80; 4.5 UG/1; UG/1
2 AEROSOL RESPIRATORY (INHALATION) 2 TIMES DAILY
Status: DISCONTINUED | OUTPATIENT
Start: 2023-06-15 | End: 2023-06-15

## 2023-06-15 RX ORDER — FERROUS SULFATE 325(65) MG
325 TABLET ORAL 2 TIMES DAILY
Status: DISCONTINUED | OUTPATIENT
Start: 2023-06-15 | End: 2023-06-17 | Stop reason: HOSPADM

## 2023-06-15 RX ORDER — AMIODARONE HYDROCHLORIDE 200 MG/1
200 TABLET ORAL DAILY
Status: DISCONTINUED | OUTPATIENT
Start: 2023-06-15 | End: 2023-06-17 | Stop reason: HOSPADM

## 2023-06-15 RX ORDER — PREDNISONE 5 MG/1
5 TABLET ORAL DAILY
Status: DISCONTINUED | OUTPATIENT
Start: 2023-06-15 | End: 2023-06-17 | Stop reason: HOSPADM

## 2023-06-15 RX ORDER — ONDANSETRON 4 MG/1
4 TABLET, ORALLY DISINTEGRATING ORAL EVERY 8 HOURS PRN
Status: DISCONTINUED | OUTPATIENT
Start: 2023-06-15 | End: 2023-06-17 | Stop reason: HOSPADM

## 2023-06-15 RX ORDER — LEVETIRACETAM 500 MG/1
500 TABLET ORAL 2 TIMES DAILY
Status: DISCONTINUED | OUTPATIENT
Start: 2023-06-15 | End: 2023-06-17 | Stop reason: HOSPADM

## 2023-06-15 RX ORDER — FAMOTIDINE 20 MG/1
20 TABLET, FILM COATED ORAL 2 TIMES DAILY
Status: DISCONTINUED | OUTPATIENT
Start: 2023-06-15 | End: 2023-06-17 | Stop reason: HOSPADM

## 2023-06-15 RX ORDER — SODIUM CHLORIDE 0.9 % (FLUSH) 0.9 %
5-40 SYRINGE (ML) INJECTION EVERY 12 HOURS SCHEDULED
Status: DISCONTINUED | OUTPATIENT
Start: 2023-06-15 | End: 2023-06-17 | Stop reason: HOSPADM

## 2023-06-15 RX ORDER — BUDESONIDE 0.25 MG/2ML
0.25 INHALANT ORAL 2 TIMES DAILY
Status: DISCONTINUED | OUTPATIENT
Start: 2023-06-15 | End: 2023-06-17 | Stop reason: HOSPADM

## 2023-06-15 RX ORDER — POLYVINYL ALCOHOL 14 MG/ML
1 SOLUTION/ DROPS OPHTHALMIC PRN
Status: DISCONTINUED | OUTPATIENT
Start: 2023-06-15 | End: 2023-06-17 | Stop reason: HOSPADM

## 2023-06-15 RX ORDER — HYDRALAZINE HYDROCHLORIDE 25 MG/1
25 TABLET, FILM COATED ORAL EVERY 8 HOURS SCHEDULED
Status: DISCONTINUED | OUTPATIENT
Start: 2023-06-15 | End: 2023-06-17 | Stop reason: HOSPADM

## 2023-06-15 RX ORDER — METOPROLOL SUCCINATE 25 MG/1
12.5 TABLET, EXTENDED RELEASE ORAL 2 TIMES DAILY
Status: DISCONTINUED | OUTPATIENT
Start: 2023-06-15 | End: 2023-06-16

## 2023-06-15 RX ORDER — POLYETHYLENE GLYCOL 3350 17 G/17G
17 POWDER, FOR SOLUTION ORAL DAILY PRN
Status: DISCONTINUED | OUTPATIENT
Start: 2023-06-15 | End: 2023-06-17 | Stop reason: HOSPADM

## 2023-06-15 RX ORDER — ARFORMOTEROL TARTRATE 15 UG/2ML
15 SOLUTION RESPIRATORY (INHALATION) 2 TIMES DAILY
Status: DISCONTINUED | OUTPATIENT
Start: 2023-06-15 | End: 2023-06-17 | Stop reason: HOSPADM

## 2023-06-15 RX ORDER — ACETAMINOPHEN 650 MG/1
650 SUPPOSITORY RECTAL EVERY 6 HOURS PRN
Status: DISCONTINUED | OUTPATIENT
Start: 2023-06-15 | End: 2023-06-17 | Stop reason: HOSPADM

## 2023-06-15 RX ADMIN — DESMOPRESSIN ACETATE 200 MCG: 0.1 TABLET ORAL at 21:05

## 2023-06-15 RX ADMIN — BUDESONIDE 250 MCG: 0.25 SUSPENSION RESPIRATORY (INHALATION) at 08:55

## 2023-06-15 RX ADMIN — FUROSEMIDE 40 MG: 10 INJECTION, SOLUTION INTRAMUSCULAR; INTRAVENOUS at 18:25

## 2023-06-15 RX ADMIN — FERROUS SULFATE TAB 325 MG (65 MG ELEMENTAL FE) 325 MG: 325 (65 FE) TAB at 21:05

## 2023-06-15 RX ADMIN — PREDNISONE 5 MG: 5 TABLET ORAL at 08:28

## 2023-06-15 RX ADMIN — DOCUSATE SODIUM 100 MG: 100 CAPSULE, LIQUID FILLED ORAL at 08:28

## 2023-06-15 RX ADMIN — KIT FOR THE PREPARATION OF TECHNETIUM TC 99M PENTETATE 30 MILLICURIE: 20 INJECTION, POWDER, LYOPHILIZED, FOR SOLUTION INTRAVENOUS; RESPIRATORY (INHALATION) at 13:24

## 2023-06-15 RX ADMIN — FUROSEMIDE 40 MG: 10 INJECTION, SOLUTION INTRAMUSCULAR; INTRAVENOUS at 08:29

## 2023-06-15 RX ADMIN — HYDRALAZINE HYDROCHLORIDE 25 MG: 25 TABLET, FILM COATED ORAL at 22:11

## 2023-06-15 RX ADMIN — Medication 6 MILLICURIE: at 13:24

## 2023-06-15 RX ADMIN — DESMOPRESSIN ACETATE 200 MCG: 0.1 TABLET ORAL at 08:28

## 2023-06-15 RX ADMIN — ARFORMOTEROL TARTRATE 15 MCG: 15 SOLUTION RESPIRATORY (INHALATION) at 08:55

## 2023-06-15 RX ADMIN — HYDRALAZINE HYDROCHLORIDE 25 MG: 25 TABLET, FILM COATED ORAL at 15:28

## 2023-06-15 RX ADMIN — METOPROLOL SUCCINATE 12.5 MG: 25 TABLET, EXTENDED RELEASE ORAL at 21:05

## 2023-06-15 RX ADMIN — METOPROLOL SUCCINATE 12.5 MG: 25 TABLET, EXTENDED RELEASE ORAL at 08:28

## 2023-06-15 RX ADMIN — DOCUSATE SODIUM 100 MG: 100 CAPSULE, LIQUID FILLED ORAL at 21:05

## 2023-06-15 RX ADMIN — LEVETIRACETAM 500 MG: 500 TABLET, FILM COATED ORAL at 21:05

## 2023-06-15 RX ADMIN — FUROSEMIDE 20 MG: 10 INJECTION, SOLUTION INTRAMUSCULAR; INTRAVENOUS at 01:00

## 2023-06-15 RX ADMIN — AMIODARONE HYDROCHLORIDE 200 MG: 200 TABLET ORAL at 08:28

## 2023-06-15 RX ADMIN — ARFORMOTEROL TARTRATE 15 MCG: 15 SOLUTION RESPIRATORY (INHALATION) at 19:55

## 2023-06-15 RX ADMIN — ENOXAPARIN SODIUM 40 MG: 100 INJECTION SUBCUTANEOUS at 08:29

## 2023-06-15 RX ADMIN — DULOXETINE HYDROCHLORIDE 60 MG: 60 CAPSULE, DELAYED RELEASE ORAL at 08:28

## 2023-06-15 RX ADMIN — LEVOTHYROXINE SODIUM 75 MCG: 75 TABLET ORAL at 08:28

## 2023-06-15 RX ADMIN — ACETAMINOPHEN 650 MG: 325 TABLET ORAL at 21:07

## 2023-06-15 RX ADMIN — FERROUS SULFATE TAB 325 MG (65 MG ELEMENTAL FE) 325 MG: 325 (65 FE) TAB at 08:28

## 2023-06-15 RX ADMIN — FAMOTIDINE 20 MG: 20 TABLET ORAL at 21:05

## 2023-06-15 RX ADMIN — SODIUM CHLORIDE, PRESERVATIVE FREE 10 ML: 5 INJECTION INTRAVENOUS at 21:06

## 2023-06-15 RX ADMIN — HYDROXYZINE PAMOATE 25 MG: 25 CAPSULE ORAL at 13:07

## 2023-06-15 RX ADMIN — FAMOTIDINE 20 MG: 20 TABLET ORAL at 08:28

## 2023-06-15 RX ADMIN — ACETAMINOPHEN 1000 MG: 500 TABLET ORAL at 01:00

## 2023-06-15 RX ADMIN — BUDESONIDE 250 MCG: 0.25 SUSPENSION RESPIRATORY (INHALATION) at 19:55

## 2023-06-15 RX ADMIN — HYDRALAZINE HYDROCHLORIDE 25 MG: 25 TABLET, FILM COATED ORAL at 08:28

## 2023-06-15 RX ADMIN — LEVETIRACETAM 500 MG: 500 TABLET, FILM COATED ORAL at 08:28

## 2023-06-15 RX ADMIN — SODIUM CHLORIDE, PRESERVATIVE FREE 10 ML: 5 INJECTION INTRAVENOUS at 08:30

## 2023-06-15 ASSESSMENT — ENCOUNTER SYMPTOMS
WHEEZING: 0
COUGH: 1
NAUSEA: 0
VOMITING: 0
SHORTNESS OF BREATH: 1
CHEST TIGHTNESS: 1

## 2023-06-15 ASSESSMENT — PAIN SCALES - GENERAL: PAINLEVEL_OUTOF10: 7

## 2023-06-15 NOTE — DISCHARGE INSTRUCTIONS
***HEART FAILURE - CONGESTIVE HEART FAILURE***  DISCHARGE INSTRUCTIONS:  GUIDELINES TO FOLLOW AT [facility]/SNF/ Assisted Living    Future Appointments   Date Time Provider Mauri Sanford   6/28/2023 12:45 PM [facility] CHF ROOM 1 [facility] [place]   7/14/2023 11:30 AM Ozzie Gurrola APRN - CNP [facility]          MEDICATIONS:  Please notify the doctor if patient is not able to take their medications or if medications are being held for any reasons (such as low blood pressure ect.)  Do not give the patient ibuprofen (Advil or Motrin), naproxen (Aleve) without talking to the doctor first. This could make their heart failure worse. WEIGHT MONITORING:   Weigh patient every day in the morning after they void (If patient is able to stand, please get a standing weight.)   Notify the doctor of a weight gain of 3 pounds or more in 1 day   OR  a total of 5 pounds or more in 1 week             DIET   Cardiac heart healthy diet:  Low sodium diet: no  more than 2,000mg (2 grams) of salt / sodium per day (which equals to a little less than  a teaspoon of salt)/ Cardiac Diet: Low saturated / low trans fat, no added salt, caffeine restricted    If patient is there for rehab and will be returning home in the near future; reinforce with the patient and the family to follow a low sodium diet (2,000 mg)- avoid using salt at the table, avoid / limit use of canned soups, processed / packaged foods, salted snacks, olives and pickles. Do not use a salt substitute without checking with the doctor. ([employer] is safe to use).        NOTIFY THE DOCTOR THE FIRST DAY OF ONSET OF ANY OF THESE   SYMPTOMS:   Weight gain of 3 pounds or more in 1 day         OR 5 pounds or more in one week  More shortness of breath  More swelling in stomach, legs, ankles or feet  Feeling more tired, No energy  Dry hacky cough  Dizziness  More chest pain / discomfort  Hard time breathing laying down

## 2023-06-15 NOTE — ED NOTES
ED to Inpatient Handoff Report    Notified floor that electronic handoff available and patient ready for transport to room 538. Safety Risks: Risk of falls    Patient in Restraints: no    Constant Observer or Patient : no    Telemetry Monitoring Ordered :Yes      Cardiac Rhythm: Sinus rhythm    Order to transfer to unit without monitor:NO    Last MEWS: 1 Time completed: 0250    Vitals:    06/14/23 1847 06/15/23 0045 06/15/23 0100 06/15/23 0250   BP: (!) 175/100  (!) 164/100 (!) 159/95   Pulse: 77 86  60   Resp: 19 14  20   Temp:    98.1 °F (36.7 °C)   TempSrc:    Axillary   SpO2:    100%   Weight:       Height:           Opportunity for questions and clarification was provided.         Alvaro Barnes RN  06/15/23 1096

## 2023-06-15 NOTE — PROGRESS NOTES
Perfect Serve message sent to [facility] cardiology Dr. Althea Dale for new consult

## 2023-06-15 NOTE — PROGRESS NOTES
Pt seen and examined earlier today by night physician who admitted pt. Chart reviewed and updated by nursing  Discussed with pulmonary who recommended transfer to [facility] for further evaluation and management of pulmonary htn which is felt to be exacerbating symptoms.  Recent heart cath with wedge pressure of 38    Call placed for transfer

## 2023-06-15 NOTE — H&P
[facility] History and Physical      CHIEF COMPLAINT:  Shortness of breath    History of Present Illness: This is a 71-year-old female with a past medical history of A-fib, CHF, CKD, diabetes insipidus, GERD, pulmonary fibrosis, sarcoidosis, hypothyroidism, and COPD who presents to the emergency room with shortness of breath. Patient was discharged to her facility yesterday from [facility] for possible HAP versus CHF exacerbation. She was diuresed and completed a course of antibiotics. Prior to that admission she had been discharged on 6/9 after a prolonged hospitalization with SBO, seizure activity, acute on chronic respiratory failure requiring intubation, non-STEMI, and UTI. Patient complains of shortness of breath that greatly worsened since prior discharge and was improved by nothing, including her BiPAP. She complains of mild, sharp, nonradiating, substernal chest pain, that she states is now improved. She complains of generalized weakness and inability to ambulate. Denies nausea, vomiting, or abdominal pain. Does complain of loose stools ED yesterday and urinary frequency. Denies dysuria. Vitals on admit: 99.6, 68, 16, 161/105, 94% on 4 L nasal cannula. Patient's baseline oxygen is 4 to 6 L and she wears the BiPAP nightly. Labs: BNP 6163 troponin 21 < 23 > 21, TSH 0.14, hemoglobin 8.5. Patient received 20 mg of Lasix IV in ED. States her shortness of breath is improved, although she remains on the BiPAP per her request.  She is able to converse comfortably on 4 L nasal cannula. Admitted for further evaluation and management.     Informant(s) for H&P: Patient and chart review    REVIEW OF SYSTEMS:  A comprehensive review of systems was negative except for: what is in the HPI      PMH:  Past Medical History:   Diagnosis Date    A-fib [facility])     follows with Dr Porfirio Aden    Abnormal mammogram 2010    Acute on chronic respiratory failure [address] 05/05/2017

## 2023-06-15 NOTE — CARE COORDINATION
Spoke with michele at [facility]; pt was <24hrs; required return to ER for respiratory distress. Per michele, they will not accept pt back. Pt was to receive NIV at facility. PCP is krishna Parkinson. Had prior hx [facility] per chart review. Hx chf . Pulmonary to follow. On bipap here. Pt requested I call  , Raimundo Burrell for information. Left VM message for him to call me. Pt/ot ordered will need to find new skilled facility at discharge. Iv lasix bid. Chf ed to follow. Await call back. Pattie Pulido. Spoke with spouse Raimundo Souzanight ([phone]) re; discharge planning. Spouse voiced frustration re; frequency of NIV. States pt was ordered at facility for NIV use at [facility] spouse states pt needs it ordered as PRN use. He feels this is an ongoing problem. [facility] pt follows with Dr Maryanne Felix. Currently on iv diuresis. Raimundo Burrell is aware pt cannot return to [facility]; will need a new facility. will provide list in room. will follow pulmonary plan. Facility at d/c TBD. Pattie Pulido.

## 2023-06-15 NOTE — ED NOTES
10:28 PM EDT  I received this patient at sign out from Dr. Sanjay Mendez. I have discussed the patient's initial exam, treatment and plan of care with the out going physician. I have introduced my self to the patient / family and have answered their questions to this point. I have examined the patient myself and reviewed ordered tests / medications and  reviewed any available results to this point. If a resident is involved in the Emergency Department care, I have discussed my findings and plan with them as well. This patient was signed out to me from Dr. Sanjay Mendez. BNP is elevated over 6000. Chest x-ray shows vascular changes. Patient was placed on BiPAP due to increased work of breathing. Patient was ordered IV Lasix. Delta troponin is negative. Spoke with hospitalist who accepted for admission, Dr. David Marc. Admitted to telemetry floor in stable condition.      Modesta Shaw DO  06/14/23 0893

## 2023-06-15 NOTE — PROGRESS NOTES
Date: 6/15/2023    Time: 12:48 AM    Patient Placed On BIPAP/CPAP/ Non-Invasive Ventilation? Patient continues on bipap    If no must comment. Facial area red/color change? No           If YES are Blister/Lesion present? No   If yes must notify nursing staff  BIPAP/CPAP skin barrier?   Yes    Skin barrier type:mepilexlite       Comments:        Kaye Lezama RCP

## 2023-06-15 NOTE — ACP (ADVANCE CARE PLANNING)
Advance Care Planning   Healthcare Decision Maker:    Primary Decision Maker: [facility] [phone]    Secondary Decision Maker: Terrance Xiao Child - [facility].

## 2023-06-16 VITALS
HEIGHT: 66 IN | OXYGEN SATURATION: 98 % | BODY MASS INDEX: 24.11 KG/M2 | SYSTOLIC BLOOD PRESSURE: 101 MMHG | WEIGHT: 150 LBS | RESPIRATION RATE: 24 BRPM | DIASTOLIC BLOOD PRESSURE: 62 MMHG | HEART RATE: 80 BPM | TEMPERATURE: 98.2 F

## 2023-06-16 PROBLEM — I27.20 PULMONARY HYPERTENSION (HCC): Status: ACTIVE | Noted: 2023-06-16

## 2023-06-16 PROBLEM — E87.6 HYPOKALEMIA: Status: ACTIVE | Noted: 2023-06-16

## 2023-06-16 PROBLEM — I50.33 ACUTE ON CHRONIC DIASTOLIC (CONGESTIVE) HEART FAILURE (HCC): Status: ACTIVE | Noted: 2023-06-16

## 2023-06-16 PROBLEM — I10 PRIMARY HYPERTENSION: Status: ACTIVE | Noted: 2023-06-16

## 2023-06-16 LAB
ANA SER QL: NEGATIVE
ANION GAP SERPL CALCULATED.3IONS-SCNC: 6 MMOL/L (ref 7–16)
BACTERIA UR CULT: ABNORMAL
BACTERIA UR CULT: ABNORMAL
BASOPHILS # BLD: 0.01 E9/L (ref 0–0.2)
BASOPHILS NFR BLD: 0.2 % (ref 0–2)
BUN SERPL-MCNC: 16 MG/DL (ref 6–23)
CALCIUM SERPL-MCNC: 8.7 MG/DL (ref 8.6–10.2)
CHLORIDE SERPL-SCNC: 100 MMOL/L (ref 98–107)
CO2 SERPL-SCNC: 36 MMOL/L (ref 22–29)
CREAT SERPL-MCNC: 1.4 MG/DL (ref 0.5–1)
EKG ATRIAL RATE: 86 BPM
EKG P AXIS: 21 DEGREES
EKG P-R INTERVAL: 118 MS
EKG Q-T INTERVAL: 358 MS
EKG QRS DURATION: 78 MS
EKG QTC CALCULATION (BAZETT): 428 MS
EKG R AXIS: 78 DEGREES
EKG T AXIS: 30 DEGREES
EKG VENTRICULAR RATE: 86 BPM
EOSINOPHIL # BLD: 0.12 E9/L (ref 0.05–0.5)
EOSINOPHIL NFR BLD: 1.9 % (ref 0–6)
ERYTHROCYTE [DISTWIDTH] IN BLOOD BY AUTOMATED COUNT: 17.3 FL (ref 11.5–15)
GLUCOSE SERPL-MCNC: 84 MG/DL (ref 74–99)
HCT VFR BLD AUTO: 31 % (ref 34–48)
HGB BLD-MCNC: 9.2 G/DL (ref 11.5–15.5)
IGA SERPL-MCNC: 251 MG/DL (ref 70–400)
IGG SERPL-MCNC: 1291 MG/DL (ref 700–1600)
IGM SERPL-MCNC: 182 MG/DL (ref 40–230)
IMM GRANULOCYTES # BLD: 0.1 E9/L
IMM GRANULOCYTES NFR BLD: 1.6 % (ref 0–5)
LYMPHOCYTES # BLD: 0.84 E9/L (ref 1.5–4)
LYMPHOCYTES NFR BLD: 13.2 % (ref 20–42)
MAGNESIUM SERPL-MCNC: 2 MG/DL (ref 1.6–2.6)
MCH RBC QN AUTO: 27 PG (ref 26–35)
MCHC RBC AUTO-ENTMCNC: 29.7 % (ref 32–34.5)
MCV RBC AUTO: 90.9 FL (ref 80–99.9)
MONOCYTES # BLD: 0.89 E9/L (ref 0.1–0.95)
MONOCYTES NFR BLD: 13.9 % (ref 2–12)
NEUTROPHILS # BLD: 4.42 E9/L (ref 1.8–7.3)
NEUTS SEG NFR BLD: 69.2 % (ref 43–80)
ORGANISM: ABNORMAL
PLATELET # BLD AUTO: 242 E9/L (ref 130–450)
PMV BLD AUTO: 10.6 FL (ref 7–12)
POTASSIUM SERPL-SCNC: 3.4 MMOL/L (ref 3.5–5)
RBC # BLD AUTO: 3.41 E12/L (ref 3.5–5.5)
SODIUM SERPL-SCNC: 142 MMOL/L (ref 132–146)
WBC # BLD: 6.4 E9/L (ref 4.5–11.5)

## 2023-06-16 PROCEDURE — 2700000000 HC OXYGEN THERAPY PER DAY

## 2023-06-16 PROCEDURE — 6360000002 HC RX W HCPCS

## 2023-06-16 PROCEDURE — 94660 CPAP INITIATION&MGMT: CPT

## 2023-06-16 PROCEDURE — 6370000000 HC RX 637 (ALT 250 FOR IP)

## 2023-06-16 PROCEDURE — 85025 COMPLETE CBC W/AUTO DIFF WBC: CPT

## 2023-06-16 PROCEDURE — 6370000000 HC RX 637 (ALT 250 FOR IP): Performed by: INTERNAL MEDICINE

## 2023-06-16 PROCEDURE — 6370000000 HC RX 637 (ALT 250 FOR IP): Performed by: NURSE PRACTITIONER

## 2023-06-16 PROCEDURE — 99223 1ST HOSP IP/OBS HIGH 75: CPT | Performed by: INTERNAL MEDICINE

## 2023-06-16 PROCEDURE — 83735 ASSAY OF MAGNESIUM: CPT

## 2023-06-16 PROCEDURE — 1200000000 HC SEMI PRIVATE

## 2023-06-16 PROCEDURE — 94640 AIRWAY INHALATION TREATMENT: CPT

## 2023-06-16 PROCEDURE — 80048 BASIC METABOLIC PNL TOTAL CA: CPT

## 2023-06-16 PROCEDURE — 2580000003 HC RX 258

## 2023-06-16 PROCEDURE — 36415 COLL VENOUS BLD VENIPUNCTURE: CPT

## 2023-06-16 PROCEDURE — 93010 ELECTROCARDIOGRAM REPORT: CPT | Performed by: INTERNAL MEDICINE

## 2023-06-16 RX ORDER — ACETAMINOPHEN 650 MG/1
650 SUPPOSITORY RECTAL EVERY 6 HOURS PRN
OUTPATIENT
Start: 2023-06-16

## 2023-06-16 RX ORDER — ONDANSETRON 4 MG/1
4 TABLET, ORALLY DISINTEGRATING ORAL EVERY 8 HOURS PRN
OUTPATIENT
Start: 2023-06-16

## 2023-06-16 RX ORDER — FAMOTIDINE 20 MG/1
20 TABLET, FILM COATED ORAL 2 TIMES DAILY
OUTPATIENT
Start: 2023-06-16

## 2023-06-16 RX ORDER — LEVOTHYROXINE SODIUM 0.07 MG/1
75 TABLET ORAL DAILY
OUTPATIENT
Start: 2023-06-17

## 2023-06-16 RX ORDER — LEVETIRACETAM 500 MG/1
500 TABLET ORAL 2 TIMES DAILY
OUTPATIENT
Start: 2023-06-16

## 2023-06-16 RX ORDER — AMIODARONE HYDROCHLORIDE 200 MG/1
200 TABLET ORAL DAILY
OUTPATIENT
Start: 2023-06-17

## 2023-06-16 RX ORDER — ENOXAPARIN SODIUM 100 MG/ML
40 INJECTION SUBCUTANEOUS DAILY
OUTPATIENT
Start: 2023-06-17

## 2023-06-16 RX ORDER — POLYVINYL ALCOHOL 14 MG/ML
1 SOLUTION/ DROPS OPHTHALMIC PRN
OUTPATIENT
Start: 2023-06-16

## 2023-06-16 RX ORDER — HYDROXYZINE PAMOATE 25 MG/1
25 CAPSULE ORAL 3 TIMES DAILY PRN
OUTPATIENT
Start: 2023-06-16

## 2023-06-16 RX ORDER — SODIUM CHLORIDE 0.9 % (FLUSH) 0.9 %
5-40 SYRINGE (ML) INJECTION EVERY 12 HOURS SCHEDULED
OUTPATIENT
Start: 2023-06-16

## 2023-06-16 RX ORDER — BUDESONIDE 0.25 MG/2ML
0.25 INHALANT ORAL 2 TIMES DAILY
OUTPATIENT
Start: 2023-06-17

## 2023-06-16 RX ORDER — METOPROLOL SUCCINATE 25 MG/1
25 TABLET, EXTENDED RELEASE ORAL ONCE
Status: COMPLETED | OUTPATIENT
Start: 2023-06-16 | End: 2023-06-16

## 2023-06-16 RX ORDER — PREDNISONE 5 MG/1
5 TABLET ORAL DAILY
OUTPATIENT
Start: 2023-06-17

## 2023-06-16 RX ORDER — DESMOPRESSIN ACETATE 0.2 MG/1
200 TABLET ORAL 2 TIMES DAILY
OUTPATIENT
Start: 2023-06-16

## 2023-06-16 RX ORDER — BISACODYL 10 MG
10 SUPPOSITORY, RECTAL RECTAL
OUTPATIENT
Start: 2023-06-16

## 2023-06-16 RX ORDER — POLYETHYLENE GLYCOL 3350 17 G/17G
17 POWDER, FOR SOLUTION ORAL DAILY PRN
OUTPATIENT
Start: 2023-06-16

## 2023-06-16 RX ORDER — METOPROLOL SUCCINATE 50 MG/1
50 TABLET, EXTENDED RELEASE ORAL 2 TIMES DAILY
OUTPATIENT
Start: 2023-06-16

## 2023-06-16 RX ORDER — IPRATROPIUM BROMIDE AND ALBUTEROL SULFATE 2.5; .5 MG/3ML; MG/3ML
1 SOLUTION RESPIRATORY (INHALATION) EVERY 4 HOURS PRN
OUTPATIENT
Start: 2023-06-16

## 2023-06-16 RX ORDER — ONDANSETRON 2 MG/ML
4 INJECTION INTRAMUSCULAR; INTRAVENOUS EVERY 6 HOURS PRN
OUTPATIENT
Start: 2023-06-16

## 2023-06-16 RX ORDER — POTASSIUM CHLORIDE 20 MEQ/1
40 TABLET, EXTENDED RELEASE ORAL ONCE
Status: COMPLETED | OUTPATIENT
Start: 2023-06-16 | End: 2023-06-16

## 2023-06-16 RX ORDER — SODIUM CHLORIDE 9 MG/ML
INJECTION, SOLUTION INTRAVENOUS PRN
OUTPATIENT
Start: 2023-06-16

## 2023-06-16 RX ORDER — SODIUM CHLORIDE 0.9 % (FLUSH) 0.9 %
5-40 SYRINGE (ML) INJECTION PRN
OUTPATIENT
Start: 2023-06-16

## 2023-06-16 RX ORDER — DULOXETIN HYDROCHLORIDE 60 MG/1
60 CAPSULE, DELAYED RELEASE ORAL DAILY
OUTPATIENT
Start: 2023-06-17

## 2023-06-16 RX ORDER — ARFORMOTEROL TARTRATE 15 UG/2ML
15 SOLUTION RESPIRATORY (INHALATION) 2 TIMES DAILY
OUTPATIENT
Start: 2023-06-17

## 2023-06-16 RX ORDER — ACETAMINOPHEN 325 MG/1
650 TABLET ORAL EVERY 6 HOURS PRN
OUTPATIENT
Start: 2023-06-16

## 2023-06-16 RX ORDER — DOCUSATE SODIUM 100 MG/1
100 CAPSULE, LIQUID FILLED ORAL 2 TIMES DAILY
OUTPATIENT
Start: 2023-06-16

## 2023-06-16 RX ORDER — HYDRALAZINE HYDROCHLORIDE 25 MG/1
25 TABLET, FILM COATED ORAL EVERY 8 HOURS SCHEDULED
Status: CANCELLED | OUTPATIENT
Start: 2023-06-16

## 2023-06-16 RX ORDER — FUROSEMIDE 10 MG/ML
40 INJECTION INTRAMUSCULAR; INTRAVENOUS 2 TIMES DAILY
OUTPATIENT
Start: 2023-06-17

## 2023-06-16 RX ORDER — FERROUS SULFATE 325(65) MG
325 TABLET ORAL 2 TIMES DAILY
OUTPATIENT
Start: 2023-06-16

## 2023-06-16 RX ORDER — METOPROLOL SUCCINATE 50 MG/1
50 TABLET, EXTENDED RELEASE ORAL 2 TIMES DAILY
Status: DISCONTINUED | OUTPATIENT
Start: 2023-06-16 | End: 2023-06-17 | Stop reason: HOSPADM

## 2023-06-16 RX ADMIN — LEVETIRACETAM 500 MG: 500 TABLET, FILM COATED ORAL at 09:54

## 2023-06-16 RX ADMIN — ENOXAPARIN SODIUM 40 MG: 100 INJECTION SUBCUTANEOUS at 09:51

## 2023-06-16 RX ADMIN — METOPROLOL SUCCINATE 50 MG: 50 TABLET, EXTENDED RELEASE ORAL at 21:28

## 2023-06-16 RX ADMIN — HYDRALAZINE HYDROCHLORIDE 25 MG: 25 TABLET, FILM COATED ORAL at 06:09

## 2023-06-16 RX ADMIN — FERROUS SULFATE TAB 325 MG (65 MG ELEMENTAL FE) 325 MG: 325 (65 FE) TAB at 21:19

## 2023-06-16 RX ADMIN — DULOXETINE HYDROCHLORIDE 60 MG: 60 CAPSULE, DELAYED RELEASE ORAL at 09:51

## 2023-06-16 RX ADMIN — DESMOPRESSIN ACETATE 200 MCG: 0.1 TABLET ORAL at 21:28

## 2023-06-16 RX ADMIN — ACETAMINOPHEN 650 MG: 325 TABLET ORAL at 16:36

## 2023-06-16 RX ADMIN — BUDESONIDE 250 MCG: 0.25 SUSPENSION RESPIRATORY (INHALATION) at 09:19

## 2023-06-16 RX ADMIN — SODIUM CHLORIDE, PRESERVATIVE FREE 10 ML: 5 INJECTION INTRAVENOUS at 21:19

## 2023-06-16 RX ADMIN — METOPROLOL SUCCINATE 25 MG: 25 TABLET, EXTENDED RELEASE ORAL at 16:37

## 2023-06-16 RX ADMIN — FERROUS SULFATE TAB 325 MG (65 MG ELEMENTAL FE) 325 MG: 325 (65 FE) TAB at 09:55

## 2023-06-16 RX ADMIN — SODIUM CHLORIDE, PRESERVATIVE FREE 10 ML: 5 INJECTION INTRAVENOUS at 09:58

## 2023-06-16 RX ADMIN — METOPROLOL SUCCINATE 12.5 MG: 25 TABLET, EXTENDED RELEASE ORAL at 09:54

## 2023-06-16 RX ADMIN — HYDRALAZINE HYDROCHLORIDE 25 MG: 25 TABLET, FILM COATED ORAL at 21:27

## 2023-06-16 RX ADMIN — ARFORMOTEROL TARTRATE 15 MCG: 15 SOLUTION RESPIRATORY (INHALATION) at 09:19

## 2023-06-16 RX ADMIN — DESMOPRESSIN ACETATE 200 MCG: 0.1 TABLET ORAL at 09:54

## 2023-06-16 RX ADMIN — LEVETIRACETAM 500 MG: 500 TABLET, FILM COATED ORAL at 21:19

## 2023-06-16 RX ADMIN — HYDRALAZINE HYDROCHLORIDE 25 MG: 25 TABLET, FILM COATED ORAL at 16:37

## 2023-06-16 RX ADMIN — FAMOTIDINE 20 MG: 20 TABLET ORAL at 09:56

## 2023-06-16 RX ADMIN — ACETAMINOPHEN 650 MG: 325 TABLET ORAL at 10:04

## 2023-06-16 RX ADMIN — IPRATROPIUM BROMIDE AND ALBUTEROL SULFATE 1 DOSE: .5; 2.5 SOLUTION RESPIRATORY (INHALATION) at 20:43

## 2023-06-16 RX ADMIN — DOCUSATE SODIUM 100 MG: 100 CAPSULE, LIQUID FILLED ORAL at 21:20

## 2023-06-16 RX ADMIN — FAMOTIDINE 20 MG: 20 TABLET ORAL at 21:20

## 2023-06-16 RX ADMIN — POTASSIUM CHLORIDE 40 MEQ: 1500 TABLET, EXTENDED RELEASE ORAL at 21:42

## 2023-06-16 RX ADMIN — FUROSEMIDE 40 MG: 10 INJECTION, SOLUTION INTRAMUSCULAR; INTRAVENOUS at 09:57

## 2023-06-16 RX ADMIN — AMIODARONE HYDROCHLORIDE 200 MG: 200 TABLET ORAL at 09:55

## 2023-06-16 RX ADMIN — LEVOTHYROXINE SODIUM 75 MCG: 75 TABLET ORAL at 09:57

## 2023-06-16 RX ADMIN — ARFORMOTEROL TARTRATE 15 MCG: 15 SOLUTION RESPIRATORY (INHALATION) at 20:43

## 2023-06-16 RX ADMIN — BUDESONIDE 250 MCG: 0.25 SUSPENSION RESPIRATORY (INHALATION) at 20:43

## 2023-06-16 RX ADMIN — FUROSEMIDE 40 MG: 10 INJECTION, SOLUTION INTRAMUSCULAR; INTRAVENOUS at 16:37

## 2023-06-16 RX ADMIN — PREDNISONE 5 MG: 5 TABLET ORAL at 09:56

## 2023-06-16 NOTE — PROGRESS NOTES
Occupational Therapy    OT order received. Chart reviewed. Attempted eval, pt asleep and on bipap. Will re-attempt when appropriate.      Babs Santiago, [address] [facility], OTR/L [id number]

## 2023-06-16 NOTE — PROGRESS NOTES
Department of Internal Medicine  Pulmonary/Critical Care Medicine  Consultant Progress Note  CC:   Chest Pain        Hx of afib, nitro x 1 given by ems     Reason for Consultation: respiratory failure, history of pulmonary fibrosis and COPD  Referring Physician: Irish Conde DO    SUBJECTIVE:  Patient seen and examined at bedside on BiPAP. Reports improvement in dyspnea. Has not worked with PT/OT. Denies chest pain, fevers, chills. Awaiting for possible transfer to [facility] for further evaluation of pulmonary HTN    I personally saw, examined, cared for the patient today. Labs, Radiographs, Medications reviewed.     OBJECTIVE:    Medications    Current Facility-Administered Medications: sodium chloride flush 0.9 % injection 5-40 mL, 5-40 mL, IntraVENous, 2 times per day  sodium chloride flush 0.9 % injection 5-40 mL, 5-40 mL, IntraVENous, PRN  0.9 % sodium chloride infusion, , IntraVENous, PRN  ondansetron (ZOFRAN-ODT) disintegrating tablet 4 mg, 4 mg, Oral, Q8H PRN **OR** ondansetron (ZOFRAN) injection 4 mg, 4 mg, IntraVENous, Q6H PRN  polyethylene glycol (GLYCOLAX) packet 17 g, 17 g, Oral, Daily PRN  acetaminophen (TYLENOL) tablet 650 mg, 650 mg, Oral, Q6H PRN **OR** acetaminophen (TYLENOL) suppository 650 mg, 650 mg, Rectal, Q6H PRN  enoxaparin (LOVENOX) injection 40 mg, 40 mg, SubCUTAneous, Daily  furosemide (LASIX) injection 40 mg, 40 mg, IntraVENous, BID  furosemide (LASIX) injection 20 mg, 20 mg, IntraVENous, Once  ipratropium 0.5 mg-albuterol 2.5 mg (DUONEB) nebulizer solution 1 Dose, 1 Dose, Inhalation, Q4H PRN  amiodarone (CORDARONE) tablet 200 mg, 200 mg, Oral, Daily  bisacodyl (DULCOLAX) suppository 10 mg, 10 mg, Rectal, Q72H PRN  desmopressin (DDAVP) tablet 200 mcg, 200 mcg, Oral, BID  docusate sodium (COLACE) capsule 100 mg, 100 mg, Oral, BID  DULoxetine (CYMBALTA) extended release capsule 60 mg, 60 mg, Oral, Daily  famotidine (PEPCID) tablet 20 mg, 20 mg, Oral, BID  ferrous sulfate (IRON 325)

## 2023-06-16 NOTE — PROGRESS NOTES
[facility]   [facility] Note    Admitting Date and Time: 6/14/2023 11:52 AM  Admit Dx: COPD exacerbation ([facility] [J44.1]  Acute on chronic combined systolic and diastolic CHF (congestive heart failure) ([facility]) [I50.43]  Chest pain, unspecified type [R07.9]  Acute congestive heart failure, unspecified heart failure type ([address] [I50.9]    Subjective:    Patient was admitted with COPD exacerbation ([facility] [J44.1]  Acute on chronic combined systolic and diastolic CHF (congestive heart failure) ([facility] [I50.43]  Chest pain, unspecified type [R07.9]  Acute congestive heart failure, unspecified heart failure type ([facility] [I50.9].  Patient denies fever, chills, cp, sob, n/v.     metoprolol succinate  50 mg Oral BID    sodium chloride flush  5-40 mL IntraVENous 2 times per day    enoxaparin  40 mg SubCUTAneous Daily    furosemide  40 mg IntraVENous BID    furosemide  20 mg IntraVENous Once    amiodarone  200 mg Oral Daily    desmopressin  200 mcg Oral BID    docusate sodium  100 mg Oral BID    DULoxetine  60 mg Oral Daily    famotidine  20 mg Oral BID    ferrous sulfate  325 mg Oral BID    hydrALAZINE  25 mg Oral 3 times per day    levETIRAcetam  500 mg Oral BID    levothyroxine  75 mcg Oral Daily    predniSONE  5 mg Oral Daily    budesonide  0.25 mg Nebulization BID    And    arformoterol tartrate  15 mcg Nebulization BID     sodium chloride flush, 5-40 mL, PRN  sodium chloride, , PRN  ondansetron, 4 mg, Q8H PRN   Or  ondansetron, 4 mg, Q6H PRN  polyethylene glycol, 17 g, Daily PRN  acetaminophen, 650 mg, Q6H PRN   Or  acetaminophen, 650 mg, Q6H PRN  ipratropium 0.5 mg-albuterol 2.5 mg, 1 Dose, Q4H PRN  bisacodyl, 10 mg, Q72H PRN  hydrOXYzine pamoate, 25 mg, TID PRN  polyvinyl alcohol, 1 drop, PRN         Objective:    BP (!) 107/57   Pulse 87   Temp 98.2 °F (36.8 °C) (Oral)   Resp 18   Ht 5' 6\" (1.676 m)   Wt 150 lb (68 kg)   LMP  (LMP Unknown)   SpO2 98%   BMI 24.21 kg/m²   Skin: warm and dry, no

## 2023-06-16 NOTE — PROGRESS NOTES
Date: 6/15/2023    Time: 9:18 PM    Patient Placed On BIPAP/CPAP/ Non-Invasive Ventilation? Yes    If no must comment. Facial area red/color change? No           If YES are Blister/Lesion present? No   If yes must notify nursing staff  BIPAP/CPAP skin barrier?   Yes    Skin barrier type:mepilexlite       Comments:     06/15/23 2116   NIV Type   Skin Assessment Clean, dry, & intact   Skin Protection for O2 Device Yes   Orientation Middle   Location Nose   Intervention(s) Skin Barrier   NIV Started/Stopped On   Equipment Type v60   Mode Bilevel   Mask Type Full face mask   Mask Size Small   Settings/Measurements   PIP Observed 14 cm H20   IPAP 14 cmH20   CPAP/EPAP 6 cmH2O   Vt (Measured) 494 mL   Rate Ordered 14   Respirations 15   Insp Rise Time (%) 3 %   FiO2  40 %   I Time/ I Time % 0.9 s   Minute Volume (L/min) 7.5 Liters   Mask Leak (lpm) 20 lpm   Comfort Level Good   Using Accessory Muscles No   SpO2 98   Patient's Home Machine No   Alarm Settings   Alarms On Y   Low Pressure (cmH2O) 8 cmH2O   High Pressure (cmH2O) 30 cmH2O   Apnea (secs) 20 secs   RR Low (bpm) 12   RR High (bpm) 50 br/min   Patient Observation   Observations red outlet, red cord in wall alarm           Nayeli Eldridge RCP

## 2023-06-16 NOTE — PROGRESS NOTES
Called pt  and gave update on patient    Pt states that nose is dry, call to Dr Navdeep Zimmer for n

## 2023-06-16 NOTE — CONSULTS
CHF NURSE NAVIGATOR CONSULT NOTE:    Patient currently admitted with diagnosis of diastolic heart failure. Patient was awake and alert, laying in bed during the consultation. She was engaged and asked appropriate questions throughout the education session. She is agreeable to continued self monitoring once discharged. She was seen at [facility] ([facility]) by the CHF navigator on 06/13 and scheduled for out patient follow up. Scheduling with the CHF clinic Yes. Future Appointments   Date Time Provider Mauri Sanford   6/28/2023 12:45 PM [facility] ROOM 1 [facility]   7/14/2023 11:30 AM FRIDA Reyez - CNP [facility]       Barriers to Care:  Contributing risk factors for Heart Failure are identified as lack of education. The patient is ordered:  Diet: ADULT DIET; Regular; Low Sodium (2 gm)   Sodium controlled diet Yes  Fluid restriction daily ordered (fluid restriction recommended if patient is hyponatremic and/or diuretic is initiated or increased) No  FR:   Daily Weights: Patient Vitals for the past 96 hrs (Last 3 readings):   Weight   06/15/23 0503 141 lb (64 kg)   06/14/23 1500 140 lb (63.5 kg)     I/O:   Intake/Output Summary (Last 24 hours) at 6/15/2023 0953  Last data filed at 6/14/2023 1753  Gross per 24 hour   Intake --   Output 900 ml   Net -900 ml              We reviewed the introduction to Heart Failure, the HF zones, signs and symptoms to report on day 1 of onset, medications, medication compliance, the importance of obtaining daily weights, following a low sodium diet, reading food labels for the sodium content, keeping physician appointments, and smoking cessation. We discussed writing / tracking daily weights on a calendar / log because a 5 pound gain in 1 week can sneak up if you are not tracking it. I advised patient they can reduce the risk for Heart Failure exacerbations by modifying / controlling the risk factors.  We discussed self-managed care which includes the
Comprehensive Nutrition Assessment    Type and Reason for Visit:  Initial, Consult, Patient Education (CHF)    Nutrition Recommendations/Plan:   Continue current diet  Will add Standard 4 oz ONS BID  Continue inpatient monitoring     Educated on high/low Na foods, fluid retention, getting daily weights  Learners: Patient  Readiness: Acceptance  Method: Explanation and Handout  Response: Verbalizes Understanding  Contact name and number provided. Malnutrition Assessment:  Malnutrition Status: Moderate malnutrition (06/15/23 1200)    Context:  Chronic Illness     Findings of the 6 clinical characteristics of malnutrition:  Energy Intake:  Mild decrease in energy intake (Comment)  Weight Loss:  Unable to assess (CHF)     Body Fat Loss:  Mild body fat loss Triceps   Muscle Mass Loss:  Mild muscle mass loss Temples (temporalis)  Fluid Accumulation:  No significant fluid accumulation     Strength:  Not Performed    Nutrition Assessment:    Pt presents with SOB- acute on chronic CHF. Hx of diabetes insipidus, acute on chronic respiratory failure, CHF, COPD. Pt was educated on diet for heart failure per consult. Pt meets criteria for moderate PCM. Pt drinks Boost at home and would like Ensure while inpatient. Will add Standard 4 oz ONS BID (jacob). Continue current diet. Nutrition Related Findings:    A&O, abd rounded/soft, +BS, I/O-, no edema, Na 143, K+ 3.3, appetite improving per pt, lasix, prednisone Wound Type: None       Current Nutrition Intake & Therapies:    Average Meal Intake: % (per paper intake sheet)  Average Supplements Intake: None Ordered  ADULT DIET; Regular; Low Sodium (2 gm)  ADULT ORAL NUTRITION SUPPLEMENT; Breakfast, Dinner; Standard 4 oz Oral Supplement    Anthropometric Measures:  Height: 5' 6\" (167.6 cm)  Ideal Body Weight (IBW): 130 lbs (59 kg)    Admission Body Weight: 141 lb (64 kg) (6/15/23)  Current Body Weight: 152 lb 14.4 oz (69.4 kg) (6/15/23), 117.6 % IBW.  Weight Source: Bed
MG tablet Take 1 tablet by mouth every 8 hours 6/8/23   Arianna You MD   metoprolol succinate (TOPROL XL) 25 MG extended release tablet Take 0.5 tablets by mouth 2 times daily 6/8/23   Arianna You MD   fluticasone furoate-vilanterol (BREO ELLIPTA) 100-25 MCG/ACT inhaler Inhale 1 puff into the lungs daily    Historical Provider, MD   OXYGEN Inhale 6 L into the lungs continuous    Historical Provider, MD   DULoxetine (CYMBALTA) 60 MG extended release capsule Take 1 capsule by mouth daily 5/11/23   Evon Mortensen MD   predniSONE (DELTASONE) 5 MG tablet Take 1 tablet by mouth daily 5/11/23   Evon Mortensen MD   desmopressin (DDAVP) 0.1 MG tablet Take 2 tablets by mouth 2 times daily 5/11/23 8/9/23  Evon Mortensen MD   levothyroxine (SYNTHROID) 75 MCG tablet Take 1 tablet by mouth daily 5/11/23   Evon Mortensen MD   famotidine (PEPCID) 20 MG tablet Take 1 tablet by mouth 2 times daily 5/11/23   Evon Mortensen MD   albuterol sulfate HFA (PROVENTIL HFA) 108 (90 Base) MCG/ACT inhaler Inhale 2 puffs into the lungs every 6 hours as needed for Wheezing or Shortness of Breath 5/11/23   Evon Mortensen MD   ferrous sulfate (IRON 325) 325 (65 Fe) MG tablet Take 1 tablet by mouth 2 times daily 5/11/23   Evon Mortensen MD   albuterol (PROVENTIL) (2.5 MG/3ML) 0.083% nebulizer solution Take 3 mLs by nebulization every 6 hours as needed for Wheezing or Shortness of Breath 5/11/23   Evon Mortensen MD   sodium chloride (OCEAN) 0.65 % nasal spray 1 spray by Nasal route as needed for Congestion    Historical Provider, MD   mupirocin Napolean Arsenio) 2 % ointment Apply topically daily 6/8/20   Adele Galvez MD       Current Medications:    Current Facility-Administered Medications: sodium chloride flush 0.9 % injection 5-40 mL, 5-40 mL, IntraVENous, 2 times per day  sodium chloride flush 0.9 % injection 5-40 mL, 5-40 mL, IntraVENous, PRN  0.9 % sodium chloride infusion, , IntraVENous, PRN  ondansetron (ZOFRAN-ODT) disintegrating tablet 4 mg, 4 mg,
remodeling. RV global systolic function is low normal.  TAPSE 17 mm. Trace MR. Moderate TR.  RVSP 119 mmHg. - Right heart cath 5/19/2023: Pressures: Mean RA: 11, RV systolic 56, RV diastolic 20, PA 66/07 with mean of 53, wedge 38. Saturations: RA 60.1, RV 56.7, PA 55.2, wedge 54.2. Cardiac output: Thermodilution 3.37, Darek 3.06.  Cardiac index: 0 dilution 1.8, Darek 1.6. Vasoreactivity challenge testing performed with IV adenosine starting at 15 mics per kilogram and increasing by 50 mics per kilogram a minute every 5 minutes to maximum 200 mics per kilogram a minute pressures were obtained.     Pietro Orozco MD  6/15/2023  5:42 PM

## 2023-06-16 NOTE — CARE COORDINATION
Pt from [facility]; will not accept back. Informed spouse she will need new facility at d/c with NIV. Per pulmonary. Needs referral for [facility] for evaluation of pulmonary HTN. Transport packet on chart. Laura Martinez.

## 2023-06-16 NOTE — PLAN OF CARE
Problem: Discharge Planning  Goal: Discharge to home or other facility with appropriate resources  Outcome: Progressing     Problem: Pain  Goal: Verbalizes/displays adequate comfort level or baseline comfort level  6/16/2023 0449 by Devyn Sepulveda RN  Outcome: Progressing  6/15/2023 1902 by Anton Paiz RN  Outcome: Progressing     Problem: Skin/Tissue Integrity  Goal: Absence of new skin breakdown  Description: 1. Monitor for areas of redness and/or skin breakdown  2. Assess vascular access sites hourly  3. Every 4-6 hours minimum:  Change oxygen saturation probe site  4. Every 4-6 hours:  If on nasal continuous positive airway pressure, respiratory therapy assess nares and determine need for appliance change or resting period.   6/16/2023 0449 by Devyn Sepulveda RN  Outcome: Progressing  6/15/2023 1902 by Anton Paiz RN  Outcome: Progressing     Problem: Safety - Adult  Goal: Free from fall injury  Outcome: Progressing     Problem: Chronic Conditions and Co-morbidities  Goal: Patient's chronic conditions and co-morbidity symptoms are monitored and maintained or improved  Outcome: Progressing     Problem: Nutrition Deficit:  Goal: Optimize nutritional status  Outcome: Progressing

## 2023-06-17 NOTE — PROGRESS NOTES
Call placed to [facility]) per pt request to update on transfer to [facility]. Provided unit/bed number and PAS ETA of [phone] at this time. Will call and update when pt picked up for transfer.

## 2023-06-17 NOTE — FLOWSHEET NOTE
06/16/23 2111   Vital Signs   Pulse 80   /62   MAP (Calculated) 75         Due for desmopressin, toprol xL, and hydralazine. Perfect serve sent to FRIDA Moody with current BP and HR. No hold parameters on meds; awaiting reply. 9:26 PM--Received call from Dr. Shantel Corley -- parameter to hold hydralazine for SBP<100 received.

## 2023-06-17 NOTE — PLAN OF CARE

## 2023-06-17 NOTE — DISCHARGE SUMMARY
[facility] Physician Discharge Summary       No follow-up provider specified. Activity level: as kennedy    Diet: No diet orders on file    Dispo:[facility]    Condition at discharge: fair        Patient ID:  Trevor Kobi  [id number]  78 y.o.  1955    Admit date: 6/14/2023    Discharge date and time:  6/17/2023  9:36 AM    Admission Diagnoses: Principal Problem:    Acute on chronic combined systolic and diastolic CHF (congestive heart failure) (HCC)  Active Problems:    Acute congestive heart failure (HCC)    Hypothyroidism    Diabetes insipidus (HCC)    Chronic kidney disease, stage III (moderate) (HCC)    PAF (paroxysmal atrial fibrillation) (HCC) currently in NSR    COPD exacerbation (HCC)    Chest pain    Chronic respiratory failure, unsp w hypoxia or hypercapnia (HCC)    Moderate protein-calorie malnutrition (HCC)    Hypokalemia    Primary hypertension    Pulmonary hypertension ([address]    Acute on chronic diastolic (congestive) heart failure ([address]  Resolved Problems:    * No resolved hospital problems. *      Discharge Diagnoses: Principal Problem:    Acute on chronic combined systolic and diastolic CHF (congestive heart failure) (HCC)  Active Problems:    Acute congestive heart failure (HCC)    Hypothyroidism    Diabetes insipidus (HCC)    Chronic kidney disease, stage III (moderate) (HCC)    PAF (paroxysmal atrial fibrillation) (HCC) currently in NSR    COPD exacerbation (HCC)    Chest pain    Chronic respiratory failure, unsp w hypoxia or hypercapnia (HCC)    Moderate protein-calorie malnutrition (HCC)    Hypokalemia    Primary hypertension    Pulmonary hypertension (HCC)    Acute on chronic diastolic (congestive) heart failure ([address]  Resolved Problems:    * No resolved hospital problems.  *      Acute on chronic diastolic chf  Chronic respiratory failure with hypoxia and hypercapnia  Severe pulm htn  Hx of pulm sarcoidosis  DI  Hypokalemia  Atrial

## 2023-06-17 NOTE — PROGRESS NOTES
Nurse to nurse report called to RN at [facility]; pt going to unit G-[id number], bed 14.

## 2023-06-17 NOTE — PROGRESS NOTES
Bed received [address] [address] [address] 14.  Nurse to Nurse [phone] - Request for disc and transfer report     Electronically signed by Pranav Mendoza RN on 6/16/2023 at 8:04 PM

## 2023-06-18 LAB
IGG1 SER-MCNC: 917 MG/DL (ref 240–1118)
IGG2 SER-MCNC: 197 MG/DL (ref 124–549)
IGG3 SER-MCNC: 71 MG/DL (ref 21–134)
IGG4 SER-MCNC: 137 MG/DL (ref 1–123)

## 2023-06-19 PROBLEM — R77.8 ELEVATED TROPONIN: Status: RESOLVED | Noted: 2023-05-20 | Resolved: 2023-06-19

## 2023-06-19 PROBLEM — R79.89 ELEVATED TROPONIN: Status: RESOLVED | Noted: 2023-05-20 | Resolved: 2023-06-19

## 2023-06-19 LAB
BACTERIA BLD CULT ORG #2: NORMAL
BACTERIA BLD CULT: NORMAL
MYELOPEROXIDASE AB SER-ACNC: 0 AU/ML (ref 0–19)
PROTEINASE3 AB SER-ACNC: 3 AU/ML (ref 0–19)

## 2023-06-20 LAB
EKG ATRIAL RATE: 82 BPM
EKG P AXIS: -6 DEGREES
EKG P-R INTERVAL: 118 MS
EKG Q-T INTERVAL: 430 MS
EKG QRS DURATION: 86 MS
EKG QTC CALCULATION (BAZETT): 502 MS
EKG R AXIS: 75 DEGREES
EKG T AXIS: 96 DEGREES
EKG VENTRICULAR RATE: 82 BPM

## 2023-06-28 ENCOUNTER — HOSPITAL ENCOUNTER (OUTPATIENT)
Dept: OTHER | Age: 68
Setting detail: THERAPIES SERIES
Discharge: HOME | End: 2023-06-28

## 2023-06-29 ENCOUNTER — TELEPHONE (OUTPATIENT)
Dept: ADMINISTRATIVE | Age: 68
End: 2023-06-29

## 2023-06-29 RX ORDER — FLUTICASONE FUROATE AND VILANTEROL 100; 25 UG/1; UG/1
1 POWDER RESPIRATORY (INHALATION) DAILY
Qty: 1 EACH | Refills: 2 | Status: SHIPPED | OUTPATIENT
Start: 2023-06-29

## 2023-07-02 PROBLEM — G93.40 ACUTE ENCEPHALOPATHY: Status: RESOLVED | Noted: 2023-05-21 | Resolved: 2023-07-02

## 2023-07-02 PROBLEM — I50.33 ACUTE ON CHRONIC HEART FAILURE WITH PRESERVED EJECTION FRACTION (HCC): Status: RESOLVED | Noted: 2023-06-01 | Resolved: 2023-07-02

## 2023-07-02 PROBLEM — J96.02 ACUTE RESPIRATORY FAILURE WITH HYPOXIA AND HYPERCAPNIA (HCC): Status: RESOLVED | Noted: 2023-05-16 | Resolved: 2023-07-02

## 2023-07-02 PROBLEM — I50.43 ACUTE ON CHRONIC COMBINED SYSTOLIC AND DIASTOLIC CHF (CONGESTIVE HEART FAILURE) (HCC): Status: RESOLVED | Noted: 2023-06-14 | Resolved: 2023-07-02

## 2023-07-02 PROBLEM — I27.20 PULMONARY HYPERTENSION (HCC): Status: RESOLVED | Noted: 2023-06-16 | Resolved: 2023-07-02

## 2023-07-02 PROBLEM — I50.33 ACUTE ON CHRONIC DIASTOLIC (CONGESTIVE) HEART FAILURE (HCC): Status: RESOLVED | Noted: 2023-06-16 | Resolved: 2023-07-02

## 2023-07-02 PROBLEM — J96.01 ACUTE RESPIRATORY FAILURE WITH HYPOXIA (HCC): Status: RESOLVED | Noted: 2023-06-10 | Resolved: 2023-07-02

## 2023-07-02 PROBLEM — R41.0 CONFUSION: Status: RESOLVED | Noted: 2022-07-10 | Resolved: 2023-07-02

## 2023-07-02 PROBLEM — J90 BILATERAL PLEURAL EFFUSION: Status: RESOLVED | Noted: 2022-07-15 | Resolved: 2023-07-02

## 2023-07-02 PROBLEM — I50.9 ACUTE CONGESTIVE HEART FAILURE (HCC): Status: RESOLVED | Noted: 2023-03-05 | Resolved: 2023-07-02

## 2023-07-02 PROBLEM — J44.1 COPD EXACERBATION (HCC): Status: RESOLVED | Noted: 2017-02-06 | Resolved: 2023-07-02

## 2023-07-02 PROBLEM — J96.21 ACUTE ON CHRONIC RESPIRATORY FAILURE WITH HYPOXIA AND HYPERCAPNIA (HCC): Status: RESOLVED | Noted: 2023-05-18 | Resolved: 2023-07-02

## 2023-07-02 PROBLEM — J96.22 ACUTE ON CHRONIC RESPIRATORY FAILURE WITH HYPOXIA AND HYPERCAPNIA (HCC): Status: RESOLVED | Noted: 2023-05-18 | Resolved: 2023-07-02

## 2023-07-02 PROBLEM — I50.41 ACUTE COMBINED SYSTOLIC AND DIASTOLIC CHF, NYHA CLASS 1 (HCC): Status: RESOLVED | Noted: 2022-07-14 | Resolved: 2023-07-02

## 2023-07-02 PROBLEM — J96.01 ACUTE RESPIRATORY FAILURE WITH HYPOXIA AND HYPERCAPNIA (HCC): Status: RESOLVED | Noted: 2023-05-16 | Resolved: 2023-07-02

## 2023-07-02 PROBLEM — J96.10 CHRONIC RESPIRATORY FAILURE, UNSP W HYPOXIA OR HYPERCAPNIA (HCC): Status: RESOLVED | Noted: 2023-06-14 | Resolved: 2023-07-02

## 2023-07-03 ENCOUNTER — OFFICE VISIT (OUTPATIENT)
Dept: FAMILY MEDICINE CLINIC | Age: 68
End: 2023-07-03
Payer: MEDICARE

## 2023-07-03 VITALS
WEIGHT: 147 LBS | HEART RATE: 112 BPM | TEMPERATURE: 97.9 F | SYSTOLIC BLOOD PRESSURE: 100 MMHG | HEIGHT: 62 IN | DIASTOLIC BLOOD PRESSURE: 67 MMHG | OXYGEN SATURATION: 94 % | RESPIRATION RATE: 16 BRPM | BODY MASS INDEX: 27.05 KG/M2

## 2023-07-03 DIAGNOSIS — G89.4 CHRONIC PAIN SYNDROME: ICD-10-CM

## 2023-07-03 DIAGNOSIS — I27.20 SEVERE PULMONARY HYPERTENSION (HCC): Primary | ICD-10-CM

## 2023-07-03 DIAGNOSIS — D86.0 PULMONARY SARCOIDOSIS (HCC): ICD-10-CM

## 2023-07-03 DIAGNOSIS — J96.11 CHRONIC RESPIRATORY FAILURE WITH HYPOXIA (HCC): Chronic | ICD-10-CM

## 2023-07-03 DIAGNOSIS — K62.5 BRBPR (BRIGHT RED BLOOD PER RECTUM): ICD-10-CM

## 2023-07-03 DIAGNOSIS — D63.8 ANEMIA DUE TO CHRONIC ILLNESS: ICD-10-CM

## 2023-07-03 DIAGNOSIS — I82.C11 ACUTE THROMBOSIS OF RIGHT INTERNAL JUGULAR VEIN (HCC): ICD-10-CM

## 2023-07-03 DIAGNOSIS — I10 PRIMARY HYPERTENSION: ICD-10-CM

## 2023-07-03 LAB
BASOPHILS # BLD: 0.04 E9/L (ref 0–0.2)
BASOPHILS NFR BLD: 0.4 % (ref 0–2)
EOSINOPHIL # BLD: 0.1 E9/L (ref 0.05–0.5)
EOSINOPHIL NFR BLD: 1.1 % (ref 0–6)
ERYTHROCYTE [DISTWIDTH] IN BLOOD BY AUTOMATED COUNT: 15.5 FL (ref 11.5–15)
HCT VFR BLD AUTO: 35.8 % (ref 34–48)
HGB BLD-MCNC: 10.5 G/DL (ref 11.5–15.5)
IMM GRANULOCYTES # BLD: 0.03 E9/L
IMM GRANULOCYTES NFR BLD: 0.3 % (ref 0–5)
LYMPHOCYTES # BLD: 0.6 E9/L (ref 1.5–4)
LYMPHOCYTES NFR BLD: 6.7 % (ref 20–42)
MCH RBC QN AUTO: 27 PG (ref 26–35)
MCHC RBC AUTO-ENTMCNC: 29.3 % (ref 32–34.5)
MCV RBC AUTO: 92 FL (ref 80–99.9)
MONOCYTES # BLD: 0.37 E9/L (ref 0.1–0.95)
MONOCYTES NFR BLD: 4.1 % (ref 2–12)
NEUTROPHILS # BLD: 7.83 E9/L (ref 1.8–7.3)
NEUTS SEG NFR BLD: 87.4 % (ref 43–80)
PLATELET # BLD AUTO: 183 E9/L (ref 130–450)
PMV BLD AUTO: 13.1 FL (ref 7–12)
RBC # BLD AUTO: 3.89 E12/L (ref 3.5–5.5)
WBC # BLD: 9 E9/L (ref 4.5–11.5)

## 2023-07-03 PROCEDURE — 3074F SYST BP LT 130 MM HG: CPT | Performed by: FAMILY MEDICINE

## 2023-07-03 PROCEDURE — 1111F DSCHRG MED/CURRENT MED MERGE: CPT | Performed by: FAMILY MEDICINE

## 2023-07-03 PROCEDURE — 3078F DIAST BP <80 MM HG: CPT | Performed by: FAMILY MEDICINE

## 2023-07-03 PROCEDURE — 36415 COLL VENOUS BLD VENIPUNCTURE: CPT | Performed by: FAMILY MEDICINE

## 2023-07-03 PROCEDURE — 1123F ACP DISCUSS/DSCN MKR DOCD: CPT | Performed by: FAMILY MEDICINE

## 2023-07-03 PROCEDURE — G8399 PT W/DXA RESULTS DOCUMENT: HCPCS | Performed by: FAMILY MEDICINE

## 2023-07-03 PROCEDURE — G8427 DOCREV CUR MEDS BY ELIG CLIN: HCPCS | Performed by: FAMILY MEDICINE

## 2023-07-03 PROCEDURE — 1090F PRES/ABSN URINE INCON ASSESS: CPT | Performed by: FAMILY MEDICINE

## 2023-07-03 PROCEDURE — G8417 CALC BMI ABV UP PARAM F/U: HCPCS | Performed by: FAMILY MEDICINE

## 2023-07-03 PROCEDURE — 1036F TOBACCO NON-USER: CPT | Performed by: FAMILY MEDICINE

## 2023-07-03 PROCEDURE — 99213 OFFICE O/P EST LOW 20 MIN: CPT | Performed by: FAMILY MEDICINE

## 2023-07-03 PROCEDURE — 3017F COLORECTAL CA SCREEN DOC REV: CPT | Performed by: FAMILY MEDICINE

## 2023-07-04 RX ORDER — POTASSIUM CHLORIDE 20 MEQ/1
20 TABLET, EXTENDED RELEASE ORAL DAILY
Status: ON HOLD | COMMUNITY
Start: 2023-06-29

## 2023-07-04 RX ORDER — AMLODIPINE BESYLATE 10 MG/1
10 TABLET ORAL DAILY
Status: ON HOLD | COMMUNITY
Start: 2023-06-28

## 2023-07-04 RX ORDER — FUROSEMIDE 20 MG/1
20 TABLET ORAL DAILY
Qty: 30 TABLET | Refills: 5 | Status: ON HOLD | COMMUNITY
Start: 2023-06-28 | End: 2023-12-25

## 2023-07-04 RX ORDER — APIXABAN 5 MG/1
1 TABLET, FILM COATED ORAL 2 TIMES DAILY
Status: ON HOLD | COMMUNITY
Start: 2023-06-28

## 2023-07-05 ENCOUNTER — TELEPHONE (OUTPATIENT)
Dept: OTHER | Facility: CLINIC | Age: 68
End: 2023-07-05

## 2023-07-05 ENCOUNTER — HOSPITAL ENCOUNTER (INPATIENT)
Age: 68
LOS: 14 days | Discharge: HOME HEALTH | End: 2023-07-19
Attending: EMERGENCY MEDICINE | Admitting: FAMILY MEDICINE
Payer: MEDICARE

## 2023-07-05 ENCOUNTER — APPOINTMENT (OUTPATIENT)
Dept: CT IMAGING | Age: 68
End: 2023-07-05
Payer: MEDICARE

## 2023-07-05 ENCOUNTER — APPOINTMENT (OUTPATIENT)
Dept: GENERAL RADIOLOGY | Age: 68
End: 2023-07-05
Payer: MEDICARE

## 2023-07-05 DIAGNOSIS — J18.9 PNEUMONIA OF LEFT LOWER LOBE DUE TO INFECTIOUS ORGANISM: ICD-10-CM

## 2023-07-05 DIAGNOSIS — I50.9 ACUTE CONGESTIVE HEART FAILURE, UNSPECIFIED HEART FAILURE TYPE (HCC): ICD-10-CM

## 2023-07-05 DIAGNOSIS — J96.01 ACUTE RESPIRATORY FAILURE WITH HYPOXIA (HCC): Primary | ICD-10-CM

## 2023-07-05 DIAGNOSIS — J44.1 ACUTE EXACERBATION OF CHRONIC OBSTRUCTIVE PULMONARY DISEASE (COPD) (HCC): ICD-10-CM

## 2023-07-05 LAB
AADO2: 263.7 MMHG
AADO2: 493.5 MMHG
ABO + RH BLD: NORMAL
ALBUMIN SERPL-MCNC: 4 G/DL (ref 3.5–5.2)
ALP SERPL-CCNC: 89 U/L (ref 35–104)
ALT SERPL-CCNC: 15 U/L (ref 0–32)
ANION GAP SERPL CALCULATED.3IONS-SCNC: 12 MMOL/L (ref 7–16)
AST SERPL-CCNC: 21 U/L (ref 0–31)
B.E.: 5.4 MMOL/L (ref -3–3)
B.E.: 6.7 MMOL/L (ref -3–3)
BASOPHILS # BLD: 0.02 E9/L (ref 0–0.2)
BASOPHILS NFR BLD: 0.2 % (ref 0–2)
BILIRUB SERPL-MCNC: 0.5 MG/DL (ref 0–1.2)
BLD GP AB SCN SERPL QL: NORMAL
BNP BLD-MCNC: 4314 PG/ML (ref 0–125)
BUN SERPL-MCNC: 24 MG/DL (ref 6–23)
CALCIUM SERPL-MCNC: 9.3 MG/DL (ref 8.6–10.2)
CHLORIDE SERPL-SCNC: 98 MMOL/L (ref 98–107)
CO2 SERPL-SCNC: 28 MMOL/L (ref 22–29)
COHB: 0.7 % (ref 0–1.5)
COHB: 0.8 % (ref 0–1.5)
CREAT SERPL-MCNC: 1.1 MG/DL (ref 0.5–1)
CRITICAL: ABNORMAL
CRITICAL: ABNORMAL
CRP SERPL HS-MCNC: 13.9 MG/DL (ref 0–0.4)
D DIMER: 426 NG/ML DDU
DATE ANALYZED: ABNORMAL
DATE ANALYZED: ABNORMAL
DATE OF COLLECTION: ABNORMAL
DATE OF COLLECTION: ABNORMAL
EKG ATRIAL RATE: 95 BPM
EKG P AXIS: 19 DEGREES
EKG P-R INTERVAL: 122 MS
EKG Q-T INTERVAL: 354 MS
EKG QRS DURATION: 78 MS
EKG QTC CALCULATION (BAZETT): 444 MS
EKG R AXIS: 95 DEGREES
EKG T AXIS: 72 DEGREES
EKG VENTRICULAR RATE: 95 BPM
EOSINOPHIL # BLD: 0.18 E9/L (ref 0.05–0.5)
EOSINOPHIL NFR BLD: 1.5 % (ref 0–6)
ERYTHROCYTE [DISTWIDTH] IN BLOOD BY AUTOMATED COUNT: 15.3 FL (ref 11.5–15)
ERYTHROCYTE [SEDIMENTATION RATE] IN BLOOD BY WESTERGREN METHOD: 36 MM/HR (ref 0–20)
FIO2: 100 %
FIO2: 60 %
GLUCOSE SERPL-MCNC: 88 MG/DL (ref 74–99)
HCO3: 30.1 MMOL/L (ref 22–26)
HCO3: 31.2 MMOL/L (ref 22–26)
HCT VFR BLD AUTO: 34.5 % (ref 34–48)
HGB BLD-MCNC: 10.6 G/DL (ref 11.5–15.5)
HHB: 0.8 % (ref 0–5)
HHB: 2.5 % (ref 0–5)
IMM GRANULOCYTES # BLD: 0.05 E9/L
IMM GRANULOCYTES NFR BLD: 0.4 % (ref 0–5)
LAB: ABNORMAL
LAB: ABNORMAL
LACTATE BLDV-SCNC: 0.9 MMOL/L (ref 0.5–1.9)
LYMPHOCYTES # BLD: 0.69 E9/L (ref 1.5–4)
LYMPHOCYTES NFR BLD: 5.7 % (ref 20–42)
Lab: ABNORMAL
Lab: ABNORMAL
MCH RBC QN AUTO: 27.2 PG (ref 26–35)
MCHC RBC AUTO-ENTMCNC: 30.7 % (ref 32–34.5)
MCV RBC AUTO: 88.7 FL (ref 80–99.9)
METHB: 0.4 % (ref 0–1.5)
METHB: 0.5 % (ref 0–1.5)
MODE: ABNORMAL
MODE: ABNORMAL
MONOCYTES # BLD: 0.86 E9/L (ref 0.1–0.95)
MONOCYTES NFR BLD: 7.1 % (ref 2–12)
NEUTROPHILS # BLD: 10.28 E9/L (ref 1.8–7.3)
NEUTS SEG NFR BLD: 85.1 % (ref 43–80)
O2 CONTENT: 15.4 ML/DL
O2 CONTENT: 16.4 ML/DL
O2 SATURATION: 97.5 % (ref 92–98.5)
O2 SATURATION: 99.2 % (ref 92–98.5)
O2HB: 96.3 % (ref 94–97)
O2HB: 98 % (ref 94–97)
OPERATOR ID: 366
OPERATOR ID: 421
PATIENT TEMP: 37 C
PATIENT TEMP: 37 C
PCO2: 44.2 MMHG (ref 35–45)
PCO2: 44.8 MMHG (ref 35–45)
PEEP/CPAP: 8 CMH2O
PEEP/CPAP: 8 CMH2O
PFO2: 1.5 MMHG/%
PFO2: 1.66 MMHG/%
PH BLOOD GAS: 7.45 (ref 7.35–7.45)
PH BLOOD GAS: 7.47 (ref 7.35–7.45)
PLATELET # BLD AUTO: 165 E9/L (ref 130–450)
PMV BLD AUTO: 11 FL (ref 7–12)
PO2: 150.3 MMHG (ref 75–100)
PO2: 99.8 MMHG (ref 75–100)
POTASSIUM SERPL-SCNC: 4.1 MMOL/L (ref 3.5–5)
PROCALCITONIN: 0.37 NG/ML (ref 0–0.08)
PROT SERPL-MCNC: 7.3 G/DL (ref 6.4–8.3)
RBC # BLD AUTO: 3.89 E12/L (ref 3.5–5.5)
RI(T): 2.64
RI(T): 3.28
RR MECHANICAL: 16 B/MIN
RR MECHANICAL: 16 B/MIN
SODIUM SERPL-SCNC: 138 MMOL/L (ref 132–146)
SOURCE, BLOOD GAS: ABNORMAL
SOURCE, BLOOD GAS: ABNORMAL
THB: 11.3 G/DL (ref 11.5–16.5)
THB: 11.7 G/DL (ref 11.5–16.5)
TIME ANALYZED: 1630
TIME ANALYZED: 825
TROPONIN, HIGH SENSITIVITY: 35 NG/L (ref 0–9)
TROPONIN, HIGH SENSITIVITY: 38 NG/L (ref 0–9)
VT MECHANICAL: 500 ML
VT MECHANICAL: 500 ML
WBC # BLD: 12.1 E9/L (ref 4.5–11.5)

## 2023-07-05 PROCEDURE — 2580000003 HC RX 258

## 2023-07-05 PROCEDURE — 2580000003 HC RX 258: Performed by: EMERGENCY MEDICINE

## 2023-07-05 PROCEDURE — 36415 COLL VENOUS BLD VENIPUNCTURE: CPT

## 2023-07-05 PROCEDURE — 87449 NOS EACH ORGANISM AG IA: CPT

## 2023-07-05 PROCEDURE — 94664 DEMO&/EVAL PT USE INHALER: CPT

## 2023-07-05 PROCEDURE — 83605 ASSAY OF LACTIC ACID: CPT

## 2023-07-05 PROCEDURE — 2700000000 HC OXYGEN THERAPY PER DAY

## 2023-07-05 PROCEDURE — 2500000003 HC RX 250 WO HCPCS: Performed by: EMERGENCY MEDICINE

## 2023-07-05 PROCEDURE — 85025 COMPLETE CBC W/AUTO DIFF WBC: CPT

## 2023-07-05 PROCEDURE — 86850 RBC ANTIBODY SCREEN: CPT

## 2023-07-05 PROCEDURE — 85378 FIBRIN DEGRADE SEMIQUANT: CPT

## 2023-07-05 PROCEDURE — 71045 X-RAY EXAM CHEST 1 VIEW: CPT

## 2023-07-05 PROCEDURE — 71275 CT ANGIOGRAPHY CHEST: CPT

## 2023-07-05 PROCEDURE — 6360000002 HC RX W HCPCS

## 2023-07-05 PROCEDURE — 6370000000 HC RX 637 (ALT 250 FOR IP): Performed by: EMERGENCY MEDICINE

## 2023-07-05 PROCEDURE — 82805 BLOOD GASES W/O2 SATURATION: CPT

## 2023-07-05 PROCEDURE — 6360000002 HC RX W HCPCS: Performed by: PHYSICIAN ASSISTANT

## 2023-07-05 PROCEDURE — 85651 RBC SED RATE NONAUTOMATED: CPT

## 2023-07-05 PROCEDURE — 6360000004 HC RX CONTRAST MEDICATION: Performed by: RADIOLOGY

## 2023-07-05 PROCEDURE — 93005 ELECTROCARDIOGRAM TRACING: CPT | Performed by: EMERGENCY MEDICINE

## 2023-07-05 PROCEDURE — 6360000002 HC RX W HCPCS: Performed by: EMERGENCY MEDICINE

## 2023-07-05 PROCEDURE — 99223 1ST HOSP IP/OBS HIGH 75: CPT | Performed by: INTERNAL MEDICINE

## 2023-07-05 PROCEDURE — 6370000000 HC RX 637 (ALT 250 FOR IP)

## 2023-07-05 PROCEDURE — 6360000002 HC RX W HCPCS: Performed by: INTERNAL MEDICINE

## 2023-07-05 PROCEDURE — 99285 EMERGENCY DEPT VISIT HI MDM: CPT

## 2023-07-05 PROCEDURE — 94640 AIRWAY INHALATION TREATMENT: CPT

## 2023-07-05 PROCEDURE — 94660 CPAP INITIATION&MGMT: CPT

## 2023-07-05 PROCEDURE — 84484 ASSAY OF TROPONIN QUANT: CPT

## 2023-07-05 PROCEDURE — 93010 ELECTROCARDIOGRAM REPORT: CPT | Performed by: INTERNAL MEDICINE

## 2023-07-05 PROCEDURE — 84145 PROCALCITONIN (PCT): CPT

## 2023-07-05 PROCEDURE — 96374 THER/PROPH/DIAG INJ IV PUSH: CPT

## 2023-07-05 PROCEDURE — 2000000000 HC ICU R&B

## 2023-07-05 PROCEDURE — 99222 1ST HOSP IP/OBS MODERATE 55: CPT | Performed by: FAMILY MEDICINE

## 2023-07-05 PROCEDURE — 83880 ASSAY OF NATRIURETIC PEPTIDE: CPT

## 2023-07-05 PROCEDURE — APPSS60 APP SPLIT SHARED TIME 46-60 MINUTES: Performed by: PHYSICIAN ASSISTANT

## 2023-07-05 PROCEDURE — 86140 C-REACTIVE PROTEIN: CPT

## 2023-07-05 PROCEDURE — 5A09557 ASSISTANCE WITH RESPIRATORY VENTILATION, GREATER THAN 96 CONSECUTIVE HOURS, CONTINUOUS POSITIVE AIRWAY PRESSURE: ICD-10-PCS | Performed by: EMERGENCY MEDICINE

## 2023-07-05 PROCEDURE — 86900 BLOOD TYPING SEROLOGIC ABO: CPT

## 2023-07-05 PROCEDURE — 86901 BLOOD TYPING SEROLOGIC RH(D): CPT

## 2023-07-05 PROCEDURE — 80053 COMPREHEN METABOLIC PANEL: CPT

## 2023-07-05 PROCEDURE — 87081 CULTURE SCREEN ONLY: CPT

## 2023-07-05 RX ORDER — PREDNISONE 5 MG/1
5 TABLET ORAL DAILY
Status: DISCONTINUED | OUTPATIENT
Start: 2023-07-05 | End: 2023-07-19 | Stop reason: HOSPADM

## 2023-07-05 RX ORDER — IPRATROPIUM BROMIDE AND ALBUTEROL SULFATE 2.5; .5 MG/3ML; MG/3ML
1 SOLUTION RESPIRATORY (INHALATION) ONCE
Status: COMPLETED | OUTPATIENT
Start: 2023-07-05 | End: 2023-07-05

## 2023-07-05 RX ORDER — METOPROLOL SUCCINATE 25 MG/1
25 TABLET, EXTENDED RELEASE ORAL 2 TIMES DAILY
Status: DISCONTINUED | OUTPATIENT
Start: 2023-07-05 | End: 2023-07-17

## 2023-07-05 RX ORDER — FUROSEMIDE 10 MG/ML
40 INJECTION INTRAMUSCULAR; INTRAVENOUS DAILY
Status: DISCONTINUED | OUTPATIENT
Start: 2023-07-05 | End: 2023-07-07

## 2023-07-05 RX ORDER — ACETAMINOPHEN 325 MG/1
650 TABLET ORAL EVERY 6 HOURS PRN
Status: DISCONTINUED | OUTPATIENT
Start: 2023-07-05 | End: 2023-07-19 | Stop reason: HOSPADM

## 2023-07-05 RX ORDER — SODIUM CHLORIDE 9 MG/ML
INJECTION, SOLUTION INTRAVENOUS PRN
Status: DISCONTINUED | OUTPATIENT
Start: 2023-07-05 | End: 2023-07-19 | Stop reason: HOSPADM

## 2023-07-05 RX ORDER — ONDANSETRON 4 MG/1
4 TABLET, ORALLY DISINTEGRATING ORAL EVERY 8 HOURS PRN
Status: DISCONTINUED | OUTPATIENT
Start: 2023-07-05 | End: 2023-07-19 | Stop reason: HOSPADM

## 2023-07-05 RX ORDER — DESMOPRESSIN ACETATE 0.1 MG/1
200 TABLET ORAL 2 TIMES DAILY
Status: DISCONTINUED | OUTPATIENT
Start: 2023-07-05 | End: 2023-07-19 | Stop reason: HOSPADM

## 2023-07-05 RX ORDER — SODIUM CHLORIDE 0.9 % (FLUSH) 0.9 %
5-40 SYRINGE (ML) INJECTION EVERY 12 HOURS SCHEDULED
Status: DISCONTINUED | OUTPATIENT
Start: 2023-07-05 | End: 2023-07-19 | Stop reason: HOSPADM

## 2023-07-05 RX ORDER — AMIODARONE HYDROCHLORIDE 200 MG/1
200 TABLET ORAL DAILY
Status: DISCONTINUED | OUTPATIENT
Start: 2023-07-05 | End: 2023-07-19 | Stop reason: HOSPADM

## 2023-07-05 RX ORDER — ASPIRIN 325 MG
325 TABLET ORAL ONCE
Status: COMPLETED | OUTPATIENT
Start: 2023-07-05 | End: 2023-07-05

## 2023-07-05 RX ORDER — DEXAMETHASONE SODIUM PHOSPHATE 10 MG/ML
10 INJECTION INTRAMUSCULAR; INTRAVENOUS ONCE
Status: COMPLETED | OUTPATIENT
Start: 2023-07-05 | End: 2023-07-05

## 2023-07-05 RX ORDER — POLYETHYLENE GLYCOL 3350 17 G/17G
17 POWDER, FOR SOLUTION ORAL DAILY PRN
Status: DISCONTINUED | OUTPATIENT
Start: 2023-07-05 | End: 2023-07-19 | Stop reason: HOSPADM

## 2023-07-05 RX ORDER — ACETAMINOPHEN 650 MG/1
650 SUPPOSITORY RECTAL EVERY 6 HOURS PRN
Status: DISCONTINUED | OUTPATIENT
Start: 2023-07-05 | End: 2023-07-19 | Stop reason: HOSPADM

## 2023-07-05 RX ORDER — DULOXETIN HYDROCHLORIDE 60 MG/1
60 CAPSULE, DELAYED RELEASE ORAL DAILY
Status: DISCONTINUED | OUTPATIENT
Start: 2023-07-05 | End: 2023-07-19 | Stop reason: HOSPADM

## 2023-07-05 RX ORDER — HYDRALAZINE HYDROCHLORIDE 25 MG/1
25 TABLET, FILM COATED ORAL EVERY 8 HOURS SCHEDULED
Status: DISCONTINUED | OUTPATIENT
Start: 2023-07-05 | End: 2023-07-19 | Stop reason: HOSPADM

## 2023-07-05 RX ORDER — ONDANSETRON 2 MG/ML
4 INJECTION INTRAMUSCULAR; INTRAVENOUS EVERY 6 HOURS PRN
Status: DISCONTINUED | OUTPATIENT
Start: 2023-07-05 | End: 2023-07-19 | Stop reason: HOSPADM

## 2023-07-05 RX ORDER — LEVETIRACETAM 500 MG/1
500 TABLET ORAL 2 TIMES DAILY
Status: DISCONTINUED | OUTPATIENT
Start: 2023-07-05 | End: 2023-07-19 | Stop reason: HOSPADM

## 2023-07-05 RX ORDER — FUROSEMIDE 20 MG/1
20 TABLET ORAL DAILY
Status: DISCONTINUED | OUTPATIENT
Start: 2023-07-05 | End: 2023-07-05

## 2023-07-05 RX ORDER — SODIUM CHLORIDE 0.9 % (FLUSH) 0.9 %
10 SYRINGE (ML) INJECTION
Status: DISCONTINUED | OUTPATIENT
Start: 2023-07-05 | End: 2023-07-05

## 2023-07-05 RX ORDER — SODIUM CHLORIDE 0.9 % (FLUSH) 0.9 %
5-40 SYRINGE (ML) INJECTION PRN
Status: DISCONTINUED | OUTPATIENT
Start: 2023-07-05 | End: 2023-07-19 | Stop reason: HOSPADM

## 2023-07-05 RX ORDER — GUAIFENESIN 400 MG/1
400 TABLET ORAL 4 TIMES DAILY PRN
Status: DISCONTINUED | OUTPATIENT
Start: 2023-07-05 | End: 2023-07-19 | Stop reason: HOSPADM

## 2023-07-05 RX ORDER — IPRATROPIUM BROMIDE AND ALBUTEROL SULFATE 2.5; .5 MG/3ML; MG/3ML
1 SOLUTION RESPIRATORY (INHALATION)
Status: DISCONTINUED | OUTPATIENT
Start: 2023-07-05 | End: 2023-07-19 | Stop reason: HOSPADM

## 2023-07-05 RX ORDER — DIGOXIN 0.25 MG/ML
125 INJECTION INTRAMUSCULAR; INTRAVENOUS DAILY
Status: DISCONTINUED | OUTPATIENT
Start: 2023-07-05 | End: 2023-07-13 | Stop reason: ALTCHOICE

## 2023-07-05 RX ADMIN — FUROSEMIDE 40 MG: 10 INJECTION, SOLUTION INTRAMUSCULAR; INTRAVENOUS at 17:13

## 2023-07-05 RX ADMIN — METOPROLOL SUCCINATE 25 MG: 25 TABLET, EXTENDED RELEASE ORAL at 22:25

## 2023-07-05 RX ADMIN — VANCOMYCIN HYDROCHLORIDE 1750 MG: 10 INJECTION, POWDER, LYOPHILIZED, FOR SOLUTION INTRAVENOUS at 17:22

## 2023-07-05 RX ADMIN — APIXABAN 5 MG: 5 TABLET, FILM COATED ORAL at 22:25

## 2023-07-05 RX ADMIN — CEFEPIME 2000 MG: 2 INJECTION, POWDER, FOR SOLUTION INTRAVENOUS at 23:59

## 2023-07-05 RX ADMIN — IPRATROPIUM BROMIDE AND ALBUTEROL SULFATE 1 DOSE: .5; 2.5 SOLUTION RESPIRATORY (INHALATION) at 13:04

## 2023-07-05 RX ADMIN — DOXYCYCLINE 100 MG: 100 INJECTION, POWDER, LYOPHILIZED, FOR SOLUTION INTRAVENOUS at 10:44

## 2023-07-05 RX ADMIN — DESMOPRESSIN ACETATE 200 MCG: 0.1 TABLET ORAL at 22:47

## 2023-07-05 RX ADMIN — LEVETIRACETAM 500 MG: 500 TABLET, FILM COATED ORAL at 22:26

## 2023-07-05 RX ADMIN — IPRATROPIUM BROMIDE AND ALBUTEROL SULFATE 1 DOSE: .5; 2.5 SOLUTION RESPIRATORY (INHALATION) at 06:44

## 2023-07-05 RX ADMIN — CEFTRIAXONE SODIUM 2000 MG: 2 INJECTION, POWDER, FOR SOLUTION INTRAMUSCULAR; INTRAVENOUS at 10:30

## 2023-07-05 RX ADMIN — CEFEPIME 2000 MG: 2 INJECTION, POWDER, FOR SOLUTION INTRAVENOUS at 13:24

## 2023-07-05 RX ADMIN — DEXAMETHASONE SODIUM PHOSPHATE 10 MG: 10 INJECTION INTRAMUSCULAR; INTRAVENOUS at 07:52

## 2023-07-05 RX ADMIN — IOPAMIDOL 75 ML: 755 INJECTION, SOLUTION INTRAVENOUS at 09:20

## 2023-07-05 RX ADMIN — ASPIRIN 325 MG: 325 TABLET ORAL at 10:43

## 2023-07-05 RX ADMIN — SODIUM CHLORIDE, PRESERVATIVE FREE 10 ML: 5 INJECTION INTRAVENOUS at 22:28

## 2023-07-05 RX ADMIN — IPRATROPIUM BROMIDE AND ALBUTEROL SULFATE 1 DOSE: .5; 2.5 SOLUTION RESPIRATORY (INHALATION) at 15:50

## 2023-07-05 RX ADMIN — DIGOXIN 125 MCG: 250 INJECTION, SOLUTION INTRAMUSCULAR; INTRAVENOUS at 18:42

## 2023-07-05 RX ADMIN — IPRATROPIUM BROMIDE AND ALBUTEROL SULFATE 1 DOSE: .5; 2.5 SOLUTION RESPIRATORY (INHALATION) at 19:27

## 2023-07-05 ASSESSMENT — ENCOUNTER SYMPTOMS
CHEST TIGHTNESS: 1
ABDOMINAL PAIN: 1
BACK PAIN: 1
VOICE CHANGE: 0
SHORTNESS OF BREATH: 1
COLOR CHANGE: 0
VOMITING: 0
NAUSEA: 0
PHOTOPHOBIA: 0
COUGH: 1
TROUBLE SWALLOWING: 0
BLOOD IN STOOL: 0

## 2023-07-05 ASSESSMENT — PAIN SCALES - GENERAL
PAINLEVEL_OUTOF10: 0
PAINLEVEL_OUTOF10: 4

## 2023-07-05 ASSESSMENT — PAIN DESCRIPTION - LOCATION: LOCATION: CHEST

## 2023-07-05 ASSESSMENT — PAIN - FUNCTIONAL ASSESSMENT: PAIN_FUNCTIONAL_ASSESSMENT: NONE - DENIES PAIN

## 2023-07-05 NOTE — PROGRESS NOTES
07/05/23 0757   NIV Type   $NIV $Daily Charge   NIV Started/Stopped On   Equipment Type V60   Mode AVAPS   Mask Type Full face mask   Mask Size Small   Assessment   Pulse 100   Respirations 19   SpO2 99 %   Settings/Measurements   PIP Observed 15 cm H20   CPAP/EPAP 8 cmH2O   IPAP Min 15 cmH2O   IPAP Max 30 cmH2O   Vt (Set, mL) 500 mL   Vt (Measured) 414 mL   Rate Ordered 16   Insp Rise Time (%) 3 %   FiO2  100 %   I Time/ I Time % 0.9 s   Minute Volume (L/min) 7.5 Liters   Mask Leak (lpm) 22 lpm   Alarm Settings   Alarms On Y   Low Pressure (cmH2O) 8 cmH2O   High Pressure (cmH2O) 30 cmH2O   Apnea (secs) 20 secs   RR Low (bpm) 14   RR High (bpm) 35 br/min     Date: 7/5/2023    Time: 7:58 AM    Patient Placed On BIPAP/CPAP/ Non-Invasive Ventilation? Yes  Facial area red/color change? No     If YES are Blister/Lesion present? No    BIPAP/CPAP skin barrier? Yes   Skin barrier type:mepilexlite     Comments: Placed on in ED 16/500/100/8    Ashley Serrato RCP RRT-ACCS

## 2023-07-05 NOTE — ED NOTES
Respiratory at bedside placing patient on BiPAP.  AVAPS 16, 100% EPAP8     Gosia Licea RN  07/05/23 1698

## 2023-07-05 NOTE — H&P
[facility] Resident Inpatient  History and Physical    CC: Shortness of breath    HPI: History obtained from patient. Abril Redman is a 78 y.o. female with a PMH of combined systolic/diastolic heart failure, CKD, pulmonary sarcoidosis, pulmonary hypertension, ventral hernia, diabetes insipidus, hypothyroidism, HTN, and chronic normocytic anemia who presented to the ED for worsening shortness of breath. Patient states that for the last few days, she has had severe shortness of breath and cough productive of green sputum. She also reports dizziness/lightheadedness on occasion. The patient is currently unsure of her medications, stating they have been changing frequently and she does not know which ones she is supposed to be taking. She denies chest pain, abdominal pain, orthopnea, peripheral edema, nausea, vomiting,      Patient has been admitted twice in the last two months, initially for severe bowel obstruction with subsequent development of seizures, IRMA on CKD, and acute hypoxic respiratory failure. The patient was in the ICU and intubated for several days during this admission. Her second admission was also for acute hypoxic respiratory failure 2/2 , though patient was maintained by BiPAP alone at that time. ED Course:   Patient presented to ED with worsening shortness of breath and cough productive of green sputum. Patient physical exam in the ED was notable for diminished air movement and crackles in the lung bases. Workup ordered in the ED included CBC, CMP, D-dimer, lactate, troponin, ABG, BNP, CXR, and CTA pulm. Troponins were 38>35. D-dimer was elevated at 426. ABG showed pH 7.46, pCO2 44.2, pO2 150.3, HCO3 31.2, CTA pulm showed no evidence of pulmonary embolism but development of consolidative and patchy airspace opacities suspicious for multifocal pneumonia. The patient was given a dose of rocephin and doxycycline.  While in the ED, the patient was placed on AVAPS d/t

## 2023-07-05 NOTE — PROGRESS NOTES
Pharmacy Consultation Note  (Antibiotic Dosing and Monitoring)    Initial consult date: 7/5/23  Consulting physician/provider: Dr. Sosa Babin  Drug: Vancomycin  Indication: HAP; 7 days    Age/  Gender Height Weight IBW  Allergy Information   67 y.o./female 5' 5\" (165.1 cm) 165 lb (74.8 kg)     Ideal body weight: 57 kg (125 lb 10.6 oz)  Adjusted ideal body weight: 64.1 kg (141 lb 6.4 oz)   Patient has no known allergies. Renal Function:  Recent Labs     07/05/23  0716   BUN 24*   CREATININE 1.1*     No intake or output data in the 24 hours ending 07/05/23 1625    Vancomycin Monitoring:  Trough:  No results for input(s): VANCOTROUGH in the last 72 hours. Random:  No results for input(s): VANCORANDOM in the last 72 hours. No results for input(s): Benja Lies in the last 72 hours. Historical Cultures:  Organism   Date Value Ref Range Status   06/14/2023 Enterococcus faecium (A)  Final     No results for input(s): BC in the last 72 hours. Vancomycin Administration Times:  Recent vancomycin administrations        No vancomycin IV orders with administrations found. Assessment:  Patient is a 78 y.o. female who has been initiated on vancomycin  Estimated Creatinine Clearance: 51 mL/min (A) (based on SCr of 1.1 mg/dL (H)).   To dose vancomycin, pharmacy will be utilizing [employer] calculation software for goal AUC/-600 mg/L-hr (predicted AUC/ALE = 532, Tr =15.6)    Plan:  Vancomycin 1750 mg IV x 1 dose, then start vancomycin 1500 mg IV every 24 hours  Will check vancomycin levels when appropriate  Will continue to monitor renal function   Pharmacy to follow    Thank you for the consult,    Aria Quevedo, PharmD, BCPS, BCCCP 7/5/2023 4:25 PM

## 2023-07-05 NOTE — ED PROVIDER NOTES
edit the dictations but occasionally words are mis-transcribed.)    Lorna Hankins MD (electronically signed)            Lisette Mooney MD  07/05/23 2294

## 2023-07-06 ENCOUNTER — APPOINTMENT (OUTPATIENT)
Dept: GENERAL RADIOLOGY | Age: 68
End: 2023-07-06
Payer: MEDICARE

## 2023-07-06 LAB
AADO2: 225.3 MMHG
ALBUMIN SERPL-MCNC: 3 G/DL (ref 3.5–5.2)
ALP SERPL-CCNC: 67 U/L (ref 35–104)
ALT SERPL-CCNC: 11 U/L (ref 0–32)
ANION GAP SERPL CALCULATED.3IONS-SCNC: 14 MMOL/L (ref 7–16)
ANISOCYTOSIS: ABNORMAL
AST SERPL-CCNC: 20 U/L (ref 0–31)
B.E.: 2.8 MMOL/L (ref -3–3)
BASOPHILS # BLD: 0.01 E9/L (ref 0–0.2)
BASOPHILS NFR BLD: 0.1 % (ref 0–2)
BILIRUB SERPL-MCNC: 0.3 MG/DL (ref 0–1.2)
BNP BLD-MCNC: 2375 PG/ML (ref 0–125)
BUN SERPL-MCNC: 26 MG/DL (ref 6–23)
CALCIUM SERPL-MCNC: 7.7 MG/DL (ref 8.6–10.2)
CHLORIDE SERPL-SCNC: 104 MMOL/L (ref 98–107)
CO2 SERPL-SCNC: 23 MMOL/L (ref 22–29)
COHB: 0.7 % (ref 0–1.5)
CREAT SERPL-MCNC: 1.1 MG/DL (ref 0.5–1)
CRITICAL: ABNORMAL
DATE ANALYZED: ABNORMAL
DATE OF COLLECTION: ABNORMAL
EOSINOPHIL # BLD: 0 E9/L (ref 0.05–0.5)
EOSINOPHIL NFR BLD: 0 % (ref 0–6)
ERYTHROCYTE [DISTWIDTH] IN BLOOD BY AUTOMATED COUNT: 15.6 FL (ref 11.5–15)
FIO2: 50 %
GLUCOSE SERPL-MCNC: 80 MG/DL (ref 74–99)
HCO3: 26.6 MMOL/L (ref 22–26)
HCT VFR BLD AUTO: 33.2 % (ref 34–48)
HGB BLD-MCNC: 9.7 G/DL (ref 11.5–15.5)
HHB: 5.3 % (ref 0–5)
HYPOCHROMIA: ABNORMAL
IMM GRANULOCYTES # BLD: 0.04 E9/L
IMM GRANULOCYTES NFR BLD: 0.4 % (ref 0–5)
LAB: ABNORMAL
LEGIONELLA AG UR QL: NORMAL
LYMPHOCYTES # BLD: 0.27 E9/L (ref 1.5–4)
LYMPHOCYTES NFR BLD: 2.5 % (ref 20–42)
Lab: ABNORMAL
MCH RBC QN AUTO: 26.6 PG (ref 26–35)
MCHC RBC AUTO-ENTMCNC: 29.2 % (ref 32–34.5)
MCV RBC AUTO: 91 FL (ref 80–99.9)
METHB: 0.5 % (ref 0–1.5)
MODE: ABNORMAL
MONOCYTES # BLD: 0.5 E9/L (ref 0.1–0.95)
MONOCYTES NFR BLD: 4.6 % (ref 2–12)
NEUTROPHILS # BLD: 10.01 E9/L (ref 1.8–7.3)
NEUTS SEG NFR BLD: 92.4 % (ref 43–80)
O2 CONTENT: 14.1 ML/DL
O2 SATURATION: 94.6 % (ref 92–98.5)
O2HB: 93.5 % (ref 94–97)
OPERATOR ID: ABNORMAL
OVALOCYTES: ABNORMAL
PATIENT TEMP: 37 C
PCO2: 37.7 MMHG (ref 35–45)
PEEP/CPAP: 8 CMH2O
PFO2: 1.53 MMHG/%
PH BLOOD GAS: 7.47 (ref 7.35–7.45)
PLATELET # BLD AUTO: 157 E9/L (ref 130–450)
PMV BLD AUTO: 11.7 FL (ref 7–12)
PO2: 76.3 MMHG (ref 75–100)
POIKILOCYTES: ABNORMAL
POLYCHROMASIA: ABNORMAL
POTASSIUM SERPL-SCNC: 3.9 MMOL/L (ref 3.5–5)
PROT SERPL-MCNC: 6 G/DL (ref 6.4–8.3)
RBC # BLD AUTO: 3.65 E12/L (ref 3.5–5.5)
RI(T): 2.95
ROULEAUX: ABNORMAL
RR MECHANICAL: 16 B/MIN
S PNEUM AG SPEC QL: NORMAL
SCHISTOCYTES: ABNORMAL
SODIUM SERPL-SCNC: 141 MMOL/L (ref 132–146)
SOURCE, BLOOD GAS: ABNORMAL
THB: 10.7 G/DL (ref 11.5–16.5)
TIME ANALYZED: 628
VT MECHANICAL: 500 ML
WBC # BLD: 10.8 E9/L (ref 4.5–11.5)

## 2023-07-06 PROCEDURE — 6360000002 HC RX W HCPCS: Performed by: INTERNAL MEDICINE

## 2023-07-06 PROCEDURE — 6360000002 HC RX W HCPCS: Performed by: PHYSICIAN ASSISTANT

## 2023-07-06 PROCEDURE — 99233 SBSQ HOSP IP/OBS HIGH 50: CPT | Performed by: INTERNAL MEDICINE

## 2023-07-06 PROCEDURE — 71045 X-RAY EXAM CHEST 1 VIEW: CPT

## 2023-07-06 PROCEDURE — 80053 COMPREHEN METABOLIC PANEL: CPT

## 2023-07-06 PROCEDURE — 85025 COMPLETE CBC W/AUTO DIFF WBC: CPT

## 2023-07-06 PROCEDURE — 94660 CPAP INITIATION&MGMT: CPT

## 2023-07-06 PROCEDURE — 83880 ASSAY OF NATRIURETIC PEPTIDE: CPT

## 2023-07-06 PROCEDURE — 6370000000 HC RX 637 (ALT 250 FOR IP)

## 2023-07-06 PROCEDURE — 94640 AIRWAY INHALATION TREATMENT: CPT

## 2023-07-06 PROCEDURE — 2580000003 HC RX 258

## 2023-07-06 PROCEDURE — 2000000000 HC ICU R&B

## 2023-07-06 PROCEDURE — 6360000002 HC RX W HCPCS

## 2023-07-06 PROCEDURE — 82805 BLOOD GASES W/O2 SATURATION: CPT

## 2023-07-06 PROCEDURE — 99232 SBSQ HOSP IP/OBS MODERATE 35: CPT | Performed by: FAMILY MEDICINE

## 2023-07-06 PROCEDURE — 87040 BLOOD CULTURE FOR BACTERIA: CPT

## 2023-07-06 PROCEDURE — 36415 COLL VENOUS BLD VENIPUNCTURE: CPT

## 2023-07-06 RX ORDER — BUDESONIDE 0.5 MG/2ML
500 INHALANT ORAL
Status: DISCONTINUED | OUTPATIENT
Start: 2023-07-06 | End: 2023-07-19 | Stop reason: HOSPADM

## 2023-07-06 RX ORDER — ARFORMOTEROL TARTRATE 15 UG/2ML
15 SOLUTION RESPIRATORY (INHALATION)
Status: DISCONTINUED | OUTPATIENT
Start: 2023-07-06 | End: 2023-07-19 | Stop reason: HOSPADM

## 2023-07-06 RX ORDER — IPRATROPIUM BROMIDE AND ALBUTEROL SULFATE 2.5; .5 MG/3ML; MG/3ML
1 SOLUTION RESPIRATORY (INHALATION)
Status: DISCONTINUED | OUTPATIENT
Start: 2023-07-06 | End: 2023-07-06

## 2023-07-06 RX ADMIN — LEVOTHYROXINE SODIUM 75 MCG: 0.07 TABLET ORAL at 09:16

## 2023-07-06 RX ADMIN — SODIUM CHLORIDE, PRESERVATIVE FREE 10 ML: 5 INJECTION INTRAVENOUS at 20:37

## 2023-07-06 RX ADMIN — IPRATROPIUM BROMIDE AND ALBUTEROL SULFATE 1 DOSE: .5; 2.5 SOLUTION RESPIRATORY (INHALATION) at 08:12

## 2023-07-06 RX ADMIN — ARFORMOTEROL TARTRATE 15 MCG: 15 SOLUTION RESPIRATORY (INHALATION) at 12:31

## 2023-07-06 RX ADMIN — FUROSEMIDE 40 MG: 10 INJECTION, SOLUTION INTRAMUSCULAR; INTRAVENOUS at 09:14

## 2023-07-06 RX ADMIN — CEFEPIME 2000 MG: 2 INJECTION, POWDER, FOR SOLUTION INTRAVENOUS at 10:12

## 2023-07-06 RX ADMIN — DULOXETINE 60 MG: 60 CAPSULE, DELAYED RELEASE ORAL at 09:19

## 2023-07-06 RX ADMIN — VANCOMYCIN HYDROCHLORIDE 1500 MG: 10 INJECTION, POWDER, LYOPHILIZED, FOR SOLUTION INTRAVENOUS at 17:44

## 2023-07-06 RX ADMIN — LEVETIRACETAM 500 MG: 500 TABLET, FILM COATED ORAL at 09:14

## 2023-07-06 RX ADMIN — CEFEPIME 2000 MG: 2 INJECTION, POWDER, FOR SOLUTION INTRAVENOUS at 23:41

## 2023-07-06 RX ADMIN — BUDESONIDE 500 MCG: 0.5 SUSPENSION RESPIRATORY (INHALATION) at 19:52

## 2023-07-06 RX ADMIN — METOPROLOL SUCCINATE 25 MG: 25 TABLET, EXTENDED RELEASE ORAL at 20:34

## 2023-07-06 RX ADMIN — METOPROLOL SUCCINATE 25 MG: 25 TABLET, EXTENDED RELEASE ORAL at 09:19

## 2023-07-06 RX ADMIN — APIXABAN 5 MG: 5 TABLET, FILM COATED ORAL at 20:34

## 2023-07-06 RX ADMIN — PREDNISONE 5 MG: 5 TABLET ORAL at 09:14

## 2023-07-06 RX ADMIN — DESMOPRESSIN ACETATE 200 MCG: 0.1 TABLET ORAL at 09:15

## 2023-07-06 RX ADMIN — IPRATROPIUM BROMIDE AND ALBUTEROL SULFATE 1 DOSE: .5; 2.5 SOLUTION RESPIRATORY (INHALATION) at 19:50

## 2023-07-06 RX ADMIN — AMIODARONE HYDROCHLORIDE 200 MG: 200 TABLET ORAL at 09:14

## 2023-07-06 RX ADMIN — IPRATROPIUM BROMIDE AND ALBUTEROL SULFATE 1 DOSE: .5; 2.5 SOLUTION RESPIRATORY (INHALATION) at 16:50

## 2023-07-06 RX ADMIN — APIXABAN 5 MG: 5 TABLET, FILM COATED ORAL at 09:15

## 2023-07-06 RX ADMIN — GUAIFENESIN 400 MG: 400 TABLET ORAL at 20:35

## 2023-07-06 RX ADMIN — LEVETIRACETAM 500 MG: 500 TABLET, FILM COATED ORAL at 20:34

## 2023-07-06 RX ADMIN — DESMOPRESSIN ACETATE 200 MCG: 0.1 TABLET ORAL at 20:34

## 2023-07-06 RX ADMIN — DIGOXIN 125 MCG: 250 INJECTION, SOLUTION INTRAMUSCULAR; INTRAVENOUS at 09:14

## 2023-07-06 RX ADMIN — ACETAMINOPHEN 650 MG: 325 TABLET ORAL at 20:35

## 2023-07-06 RX ADMIN — IPRATROPIUM BROMIDE AND ALBUTEROL SULFATE 1 DOSE: .5; 2.5 SOLUTION RESPIRATORY (INHALATION) at 12:30

## 2023-07-06 RX ADMIN — ARFORMOTEROL TARTRATE 15 MCG: 15 SOLUTION RESPIRATORY (INHALATION) at 19:51

## 2023-07-06 RX ADMIN — BUDESONIDE 500 MCG: 0.5 SUSPENSION RESPIRATORY (INHALATION) at 12:32

## 2023-07-06 RX ADMIN — SODIUM CHLORIDE, PRESERVATIVE FREE 10 ML: 5 INJECTION INTRAVENOUS at 09:16

## 2023-07-06 ASSESSMENT — PAIN SCALES - WONG BAKER
WONGBAKER_NUMERICALRESPONSE: 0

## 2023-07-06 ASSESSMENT — PAIN SCALES - GENERAL
PAINLEVEL_OUTOF10: 0
PAINLEVEL_OUTOF10: 0
PAINLEVEL_OUTOF10: 1
PAINLEVEL_OUTOF10: 1
PAINLEVEL_OUTOF10: 0
PAINLEVEL_OUTOF10: 5
PAINLEVEL_OUTOF10: 0
PAINLEVEL_OUTOF10: 0

## 2023-07-06 ASSESSMENT — PAIN DESCRIPTION - DESCRIPTORS: DESCRIPTORS: ACHING;DISCOMFORT

## 2023-07-06 ASSESSMENT — PAIN DESCRIPTION - LOCATION: LOCATION: BACK;GENERALIZED

## 2023-07-06 ASSESSMENT — PAIN DESCRIPTION - ORIENTATION: ORIENTATION: MID

## 2023-07-06 NOTE — PROGRESS NOTES
INPATIENT CARDIOLOGY FOLLOW-UP    Name: Alec Simmonds    Age: 78 y.o. Date of Admission: 7/5/2023  6:26 AM    Date of Service: 7/6/2023    Chief Complaint: Follow-up for AF    Interim History:  No new overnight cardiac complaints. Today, she awake and  feeling better compared to yesterday and still feeling winded. Currently with no complaints of CP, palpitations, dizziness, or lightheadedness. AF with RVR on telemetry.     Review of Systems:   Cardiac: As per HPI  General: No fever, chills  Pulmonary: As per HPI  HEENT: No visual disturbances, difficult swallowing  GI: No nausea, vomiting  Endocrine: No thyroid disease or DM  Musculoskeletal: LESLIE x 4, no focal motor deficits  Skin: Intact, no rashes  Neuro/Psych: No headache or seizures    Problem List:  Patient Active Problem List   Diagnosis    Chronic back pain    Hypothyroidism    Nephrosclerosis    Diabetes insipidus ([address])    Pulmonary sarcoidosis (HCC)    Steroid-induced avascular necrosis of shoulder (HCC)    Anemia due to chronic illness    Chronic pain syndrome    Bilateral hip pain    Debility    BRBPR (bright red blood per rectum)    Severe pulmonary hypertension (HCC)    Chronic kidney disease, stage III (moderate) (HCC)    PAF (paroxysmal atrial fibrillation) (HCC) currently in NSR    Mixed hyperlipidemia    Goals of care, counseling/discussion    Atrial fibrillation with RVR (HCC)    Chronic combined systolic and diastolic heart failure (HCC)    Chronic respiratory failure with hypoxia (HCC)    Acute respiratory failure with hypoxia (HCC)    Mixed simple and mucopurulent chronic bronchitis (HCC)    Recurrent major depressive disorder, in partial remission (HCC)    Gait disturbance    Chest pain    Chronic, continuous use of opioids    PMB (postmenopausal bleeding)    Severe dysplasia of vagina    Epistaxis    Ventral hernia without obstruction or gangrene    Long term (current) use of systemic steroids    Nuclear senile cataract

## 2023-07-06 NOTE — PROGRESS NOTES
Date: 7/6/2023    Time: 8:17 AM    Patient Placed On BIPAP/CPAP/ Non-Invasive Ventilation? Patient remains on Non-invasive ventilation from previous shift    Facial area red/color change? No           If YES are Blister/Lesion present? No   If yes must notify nursing staff  BIPAP/CPAP skin barrier?   Yes    Skin barrier type:mepilexlite     Comments:     07/06/23 0812   NIV Type   NIV Started/Stopped On   Equipment Type v60   Mode AVAPS   Mask Type Full face mask   Assessment   Pulse 62   Respirations 21   SpO2 100 %   Comfort Level Good   Using Accessory Muscles No   Skin Assessment Clean, dry, & intact   Skin Protection for O2 Device Yes   Orientation Middle   Location Nose   Intervention(s) Skin Barrier   Settings/Measurements   PIP Observed 18 cm H20   CPAP/EPAP 8 cmH2O   IPAP Min 15 cmH2O   IPAP Max 30 cmH2O   Vt (Set, mL) 500 mL   Vt (Measured) 475 mL   Rate Ordered 16   Insp Rise Time (%) 3 %   FiO2  50 %   I Time/ I Time % 0.9 s   Minute Volume (L/min) 10.3 Liters   Mask Leak (lpm) 24 lpm   Alarm Settings   Alarms On Y           Kate Vazquez RCP

## 2023-07-06 NOTE — PROGRESS NOTES
Date: 7/5/2023    Time: 11:17 PM    Patient Placed On BIPAP/CPAP/ Non-Invasive Ventilation? No    If no must comment. Facial area red/color change? No           If YES are Blister/Lesion present? No   If yes must notify nursing staff  BIPAP/CPAP skin barrier?   Yes    Skin barrier type:mepilexlite       Comments:        Dayana Hoyt RCP

## 2023-07-06 NOTE — PROGRESS NOTES
Pharmacy Consultation Note  (Antibiotic Dosing and Monitoring)    Initial consult date: 7/5/23  Consulting physician/provider: Dr. Kimberly Alvarado  Drug: Vancomycin  Indication: HAP; 7 days    Age/  Gender Height Weight IBW  Allergy Information   67 y.o./female 5' 5\" (165.1 cm) 165 lb (74.8 kg)     Ideal body weight: 57 kg (125 lb 10.6 oz)  Adjusted ideal body weight: 60.8 kg (134 lb 2 oz)   Patient has no known allergies. Renal Function:  Recent Labs     07/05/23  0716 07/06/23  0423   BUN 24* 26*   CREATININE 1.1* 1.1*         Intake/Output Summary (Last 24 hours) at 7/6/2023 1126  Last data filed at 7/6/2023 0600  Gross per 24 hour   Intake 419.57 ml   Output 925 ml   Net -505.43 ml       Vancomycin Monitoring:  Trough:  No results for input(s): VANCOTROUGH in the last 72 hours. Random:  No results for input(s): VANCORANDOM in the last 72 hours. No results for input(s): Buzzy Rue in the last 72 hours. Historical Cultures:  Organism   Date Value Ref Range Status   06/14/2023 Enterococcus faecium (A)  Final     No results for input(s): BC in the last 72 hours. Vancomycin Administration Times:    Recent vancomycin administrations                     vancomycin (VANCOCIN) 1,750 mg in sodium chloride 0.9 % 500 mL IVPB (mg) 1,750 mg New Bag 07/05/23 1722                  Assessment:  Patient is a 78 y.o. female who has been initiated on vancomycin  Estimated Creatinine Clearance: 45 mL/min (A) (based on SCr of 1.1 mg/dL (H)).   To dose vancomycin, pharmacy will be utilizing [employer] calculation software for goal AUC/-600 mg/L-hr (predicted AUC/ALE = 532, Tr =15.6)  7/6: Scr remains 1.1, UOP 0.6 mL/kg/hr    Plan:  Continue vancomycin 1500 mg IV every 24 hours  Random level tomorrow am on 7/7/23  Will continue to monitor renal function   Pharmacy to follow    Thank you for the consult,    Juan Yu, PharmD, BCPS, BCCCP 7/6/2023 11:26 AM

## 2023-07-06 NOTE — PROGRESS NOTES
[facility] Inpatient   Resident Progress Note    S:  Hospital day: 1   Brief Synopsis: Cathyann Lanes is a 78 y.o. female with a PMH of combined congestive heart failure, CKD, pulmonary sarcoidosis, pulmonary hypertension,ventral hernia, diabetes insipidus, hypothyroidism, HTN, and chronic normocytic anemia who presented on 7/5/23 for severe shortness of breath and cough productive of green sputum. Patient was placed on AVAPS but was decompensating, having increased work of breathing and tachypnea. Patient also developed afib RVR (had been in sinus rhythm upon presentation). Workup in ED showed multifocal pneumonia. Patient was admitted to ICU with acute hypoxic respiratory failure 2/2 pneumonia.      Overnight/interim:   Patient was seen this morning still on BiPAP  Patient states that shortness of breath had improved significantly and she was feeling less anxious  Patient was initially observed in sinus rhythm,but upon re-evaluation later in the day, was seen to be in atrial fibrillation        Cont meds:    sodium chloride       Scheduled meds:    budesonide  500 mcg Nebulization BID    arformoterol tartrate  15 mcg Nebulization BID    sodium chloride flush  5-40 mL IntraVENous 2 times per day    ipratropium 0.5 mg-albuterol 2.5 mg  1 Dose Inhalation Q4H WA    cefepime  2,000 mg IntraVENous Q12H    desmopressin  200 mcg Oral BID    DULoxetine  60 mg Oral Daily    apixaban  5 mg Oral BID    [Held by provider] hydrALAZINE  25 mg Oral 3 times per day    levETIRAcetam  500 mg Oral BID    levothyroxine  75 mcg Oral Daily    amiodarone  200 mg Oral Daily    metoprolol succinate  25 mg Oral BID    predniSONE  5 mg Oral Daily    furosemide  40 mg IntraVENous Daily    vancomycin  1,500 mg IntraVENous Q24H    digoxin  125 mcg IntraVENous Daily     PRN meds: sodium chloride flush, sodium chloride, ondansetron **OR** ondansetron, polyethylene glycol, acetaminophen **OR** acetaminophen, guaiFENesin     I reviewed

## 2023-07-06 NOTE — ACP (ADVANCE CARE PLANNING)
Advance Care Planning   Healthcare Decision Maker:    Primary Decision Maker: [address]  [phone]    Secondary Decision Maker: Ashleigh Cyr - Child - [phone]    Click here to complete Healthcare Decision Makers including selection of the Healthcare Decision Maker Relationship (ie \"Primary\").

## 2023-07-06 NOTE — CARE COORDINATION
Care Coordination: pt was a [facility]  6/14/23-6/17/23- transferred to [facility] from [facility]. Has been following at CHF clinic as outpt. Met with pt at bedside to discuss transition of care at discharge. Lives with , independent and plan is to return home. States she NIV and 02 4 ltr from [facility]. Follows at [facility], uses [facility].  will transport home at discharge. Also has hx of [facility], No hx of [facility]. Does not anticipate any home going needs. Electronically signed by Ifeoma Feldman RN on 7/6/2023 at 9:40 AM     The Plan for Transition of Care is related to the following treatment goals: The Patient  was provided with a choice of provider and agrees   with the discharge plan. [x] Yes [] No    Freedom of choice list was provided with basic dialogue that supports the patient's individualized plan of care/goals, treatment preferences and shares the quality data associated with the providers.  [x] Yes [] No

## 2023-07-06 NOTE — CARE COORDINATION
Case Management Assessment  Initial Evaluation    Date/Time of Evaluation: 7/6/2023 10:00 AM  Assessment Completed by: Kayla Jackson RN    If patient is discharged prior to next notation, then this note serves as note for discharge by case management. Patient Name: Lazarus Shoe                   YOB: 1955  Diagnosis: Acute respiratory failure with hypoxia ([address]) [J96.01]  Acute respiratory failure with hypoxemia ([address]) [J96.01]  Pneumonia of left lower lobe due to infectious organism [J18.9]                   Date / Time: 7/5/2023  6:26 AM    Patient Admission Status: Inpatient   Readmission Risk (Low < 19, Mod (19-27), High > 27): Readmission Risk Score: 37.2    Current PCP: Verline Dakin, MD  PCP verified by CM? Yes    Chart Reviewed: Yes      History Provided by: Patient, Medical Record  Patient Orientation: Alert and Oriented    Patient Cognition: Alert    Hospitalization in the last 30 days (Readmission):  Yes    If yes, Readmission Assessment in CM Navigator will be completed. Advance Directives:      Code Status: Full Code   Patient's Primary Decision Maker is: Legal Next of Kin    Primary Decision Maker: [id number] [facility] [phone]    Secondary Decision Maker: Bessie Marsh Child - [phone]    Discharge Planning:    Patient lives with: Spouse/Significant Other Type of Home: House  Primary Care Giver: Self  Patient Support Systems include: Spouse/Significant Other   Current Financial resources:    Current community resources:    Current services prior to admission: None            Current DME:              Type of Home Care services:  [facility]  Prior functional level: Independent in ADLs/IADLs  Current functional level: Independent in ADLs/IADLs    PT AM-PAC:   /24  OT AM-PAC:   /24    Family can provide assistance at DC:  Yes  Would you like Case Management to discuss the discharge plan with any other family members/significant others, and if so, who?

## 2023-07-06 NOTE — PLAN OF CARE
Problem: Chronic Conditions and Co-morbidities  Goal: Patient's chronic conditions and co-morbidity symptoms are monitored and maintained or improved  Outcome: Progressing  Flowsheets (Taken 7/5/2023 2000)  Care Plan - Patient's Chronic Conditions and Co-Morbidity Symptoms are Monitored and Maintained or Improved:   Monitor and assess patient's chronic conditions and comorbid symptoms for stability, deterioration, or improvement   Collaborate with multidisciplinary team to address chronic and comorbid conditions and prevent exacerbation or deterioration   Update acute care plan with appropriate goals if chronic or comorbid symptoms are exacerbated and prevent overall improvement and discharge     Problem: Discharge Planning  Goal: Discharge to home or other facility with appropriate resources  Outcome: Progressing  Flowsheets (Taken 7/5/2023 2000)  Discharge to home or other facility with appropriate resources:   Identify barriers to discharge with patient and caregiver   Identify discharge learning needs (meds, wound care, etc)   Arrange for needed discharge resources and transportation as appropriate   Arrange for interpreters to assist at discharge as needed   Refer to discharge planning if patient needs post-hospital services based on physician order or complex needs related to functional status, cognitive ability or social support system     Problem: Pain  Goal: Verbalizes/displays adequate comfort level or baseline comfort level  Outcome: Progressing  Flowsheets (Taken 7/5/2023 2352)  Verbalizes/displays adequate comfort level or baseline comfort level:   Encourage patient to monitor pain and request assistance   Assess pain using appropriate pain scale   Administer analgesics based on type and severity of pain and evaluate response   Implement non-pharmacological measures as appropriate and evaluate response   Consider cultural and social influences on pain and pain management   Notify Licensed Independent

## 2023-07-07 ENCOUNTER — APPOINTMENT (OUTPATIENT)
Dept: GENERAL RADIOLOGY | Age: 68
End: 2023-07-07
Payer: MEDICARE

## 2023-07-07 PROBLEM — E44.0 MODERATE PROTEIN-CALORIE MALNUTRITION (HCC): Chronic | Status: ACTIVE | Noted: 2023-07-07

## 2023-07-07 LAB
ALBUMIN SERPL-MCNC: 3.1 G/DL (ref 3.5–5.2)
ALP SERPL-CCNC: 63 U/L (ref 35–104)
ALT SERPL-CCNC: 10 U/L (ref 0–32)
ANION GAP SERPL CALCULATED.3IONS-SCNC: 12 MMOL/L (ref 7–16)
ANION GAP SERPL CALCULATED.3IONS-SCNC: 9 MMOL/L (ref 7–16)
ANISOCYTOSIS: ABNORMAL
AST SERPL-CCNC: 14 U/L (ref 0–31)
BASOPHILS # BLD: 0 E9/L (ref 0–0.2)
BASOPHILS NFR BLD: 0.1 % (ref 0–2)
BILIRUB SERPL-MCNC: 0.4 MG/DL (ref 0–1.2)
BUN SERPL-MCNC: 28 MG/DL (ref 6–23)
BUN SERPL-MCNC: 31 MG/DL (ref 6–23)
CALCIUM SERPL-MCNC: 8.3 MG/DL (ref 8.6–10.2)
CALCIUM SERPL-MCNC: 9 MG/DL (ref 8.6–10.2)
CHLORIDE SERPL-SCNC: 101 MMOL/L (ref 98–107)
CHLORIDE SERPL-SCNC: 103 MMOL/L (ref 98–107)
CO2 SERPL-SCNC: 27 MMOL/L (ref 22–29)
CO2 SERPL-SCNC: 32 MMOL/L (ref 22–29)
CREAT SERPL-MCNC: 1.3 MG/DL (ref 0.5–1)
CREAT SERPL-MCNC: 1.4 MG/DL (ref 0.5–1)
EKG ATRIAL RATE: 115 BPM
EKG Q-T INTERVAL: 340 MS
EKG QRS DURATION: 76 MS
EKG QTC CALCULATION (BAZETT): 470 MS
EKG R AXIS: 90 DEGREES
EKG T AXIS: 101 DEGREES
EKG VENTRICULAR RATE: 115 BPM
EOSINOPHIL # BLD: 0.07 E9/L (ref 0.05–0.5)
EOSINOPHIL NFR BLD: 0.9 % (ref 0–6)
ERYTHROCYTE [DISTWIDTH] IN BLOOD BY AUTOMATED COUNT: 15.5 FL (ref 11.5–15)
GLUCOSE SERPL-MCNC: 102 MG/DL (ref 74–99)
GLUCOSE SERPL-MCNC: 82 MG/DL (ref 74–99)
HCT VFR BLD AUTO: 29.5 % (ref 34–48)
HGB BLD-MCNC: 8.6 G/DL (ref 11.5–15.5)
LYMPHOCYTES # BLD: 0.25 E9/L (ref 1.5–4)
LYMPHOCYTES NFR BLD: 2.6 % (ref 20–42)
MCH RBC QN AUTO: 26.2 PG (ref 26–35)
MCHC RBC AUTO-ENTMCNC: 29.2 % (ref 32–34.5)
MCV RBC AUTO: 89.9 FL (ref 80–99.9)
MONOCYTES # BLD: 0 E9/L (ref 0.1–0.95)
MONOCYTES NFR BLD: 7.9 % (ref 2–12)
NEUTROPHILS # BLD: 7.95 E9/L (ref 1.8–7.3)
NEUTS SEG NFR BLD: 96.5 % (ref 43–80)
ORGANISM: ABNORMAL
PLATELET # BLD AUTO: 138 E9/L (ref 130–450)
PMV BLD AUTO: 11.2 FL (ref 7–12)
POLYCHROMASIA: ABNORMAL
POTASSIUM SERPL-SCNC: 3.6 MMOL/L (ref 3.5–5)
POTASSIUM SERPL-SCNC: 3.6 MMOL/L (ref 3.5–5)
PROT SERPL-MCNC: 6 G/DL (ref 6.4–8.3)
RBC # BLD AUTO: 3.28 E12/L (ref 3.5–5.5)
SODIUM SERPL-SCNC: 142 MMOL/L (ref 132–146)
SODIUM SERPL-SCNC: 142 MMOL/L (ref 132–146)
VANCOMYCIN SERPL-MCNC: 30 MCG/ML (ref 5–40)
WBC # BLD: 8.2 E9/L (ref 4.5–11.5)

## 2023-07-07 PROCEDURE — 6370000000 HC RX 637 (ALT 250 FOR IP)

## 2023-07-07 PROCEDURE — 6370000000 HC RX 637 (ALT 250 FOR IP): Performed by: INTERNAL MEDICINE

## 2023-07-07 PROCEDURE — 2580000003 HC RX 258

## 2023-07-07 PROCEDURE — 94640 AIRWAY INHALATION TREATMENT: CPT

## 2023-07-07 PROCEDURE — 6360000002 HC RX W HCPCS: Performed by: INTERNAL MEDICINE

## 2023-07-07 PROCEDURE — 99232 SBSQ HOSP IP/OBS MODERATE 35: CPT | Performed by: FAMILY MEDICINE

## 2023-07-07 PROCEDURE — 97530 THERAPEUTIC ACTIVITIES: CPT

## 2023-07-07 PROCEDURE — 80202 ASSAY OF VANCOMYCIN: CPT

## 2023-07-07 PROCEDURE — 51798 US URINE CAPACITY MEASURE: CPT

## 2023-07-07 PROCEDURE — 94660 CPAP INITIATION&MGMT: CPT

## 2023-07-07 PROCEDURE — 85025 COMPLETE CBC W/AUTO DIFF WBC: CPT

## 2023-07-07 PROCEDURE — 73590 X-RAY EXAM OF LOWER LEG: CPT

## 2023-07-07 PROCEDURE — 80048 BASIC METABOLIC PNL TOTAL CA: CPT

## 2023-07-07 PROCEDURE — 93010 ELECTROCARDIOGRAM REPORT: CPT | Performed by: INTERNAL MEDICINE

## 2023-07-07 PROCEDURE — 6360000002 HC RX W HCPCS

## 2023-07-07 PROCEDURE — 97161 PT EVAL LOW COMPLEX 20 MIN: CPT

## 2023-07-07 PROCEDURE — 71045 X-RAY EXAM CHEST 1 VIEW: CPT

## 2023-07-07 PROCEDURE — 97166 OT EVAL MOD COMPLEX 45 MIN: CPT

## 2023-07-07 PROCEDURE — 6360000002 HC RX W HCPCS: Performed by: PHYSICIAN ASSISTANT

## 2023-07-07 PROCEDURE — 80053 COMPREHEN METABOLIC PANEL: CPT

## 2023-07-07 PROCEDURE — 36415 COLL VENOUS BLD VENIPUNCTURE: CPT

## 2023-07-07 PROCEDURE — 1200000000 HC SEMI PRIVATE

## 2023-07-07 PROCEDURE — 99232 SBSQ HOSP IP/OBS MODERATE 35: CPT | Performed by: INTERNAL MEDICINE

## 2023-07-07 PROCEDURE — 2700000000 HC OXYGEN THERAPY PER DAY

## 2023-07-07 RX ORDER — MIDODRINE HYDROCHLORIDE 2.5 MG/1
2.5 TABLET ORAL
Status: DISCONTINUED | OUTPATIENT
Start: 2023-07-07 | End: 2023-07-19 | Stop reason: HOSPADM

## 2023-07-07 RX ORDER — FUROSEMIDE 40 MG/1
40 TABLET ORAL DAILY
Status: DISCONTINUED | OUTPATIENT
Start: 2023-07-08 | End: 2023-07-19 | Stop reason: HOSPADM

## 2023-07-07 RX ORDER — OXYCODONE HYDROCHLORIDE AND ACETAMINOPHEN 5; 325 MG/1; MG/1
1 TABLET ORAL EVERY 8 HOURS PRN
Status: DISCONTINUED | OUTPATIENT
Start: 2023-07-07 | End: 2023-07-08

## 2023-07-07 RX ADMIN — SODIUM CHLORIDE, PRESERVATIVE FREE 10 ML: 5 INJECTION INTRAVENOUS at 10:01

## 2023-07-07 RX ADMIN — IPRATROPIUM BROMIDE AND ALBUTEROL SULFATE 1 DOSE: .5; 2.5 SOLUTION RESPIRATORY (INHALATION) at 08:32

## 2023-07-07 RX ADMIN — SODIUM CHLORIDE, PRESERVATIVE FREE 10 ML: 5 INJECTION INTRAVENOUS at 22:33

## 2023-07-07 RX ADMIN — LEVETIRACETAM 500 MG: 500 TABLET, FILM COATED ORAL at 10:00

## 2023-07-07 RX ADMIN — METOPROLOL SUCCINATE 25 MG: 25 TABLET, EXTENDED RELEASE ORAL at 20:57

## 2023-07-07 RX ADMIN — DESMOPRESSIN ACETATE 200 MCG: 0.1 TABLET ORAL at 22:30

## 2023-07-07 RX ADMIN — IPRATROPIUM BROMIDE AND ALBUTEROL SULFATE 1 DOSE: .5; 2.5 SOLUTION RESPIRATORY (INHALATION) at 19:52

## 2023-07-07 RX ADMIN — CEFEPIME 2000 MG: 2 INJECTION, POWDER, FOR SOLUTION INTRAVENOUS at 12:28

## 2023-07-07 RX ADMIN — ARFORMOTEROL TARTRATE 15 MCG: 15 SOLUTION RESPIRATORY (INHALATION) at 19:52

## 2023-07-07 RX ADMIN — PREDNISONE 5 MG: 5 TABLET ORAL at 10:00

## 2023-07-07 RX ADMIN — OXYCODONE AND ACETAMINOPHEN 1 TABLET: 5; 325 TABLET ORAL at 21:09

## 2023-07-07 RX ADMIN — DIGOXIN 125 MCG: 250 INJECTION, SOLUTION INTRAMUSCULAR; INTRAVENOUS at 10:01

## 2023-07-07 RX ADMIN — MIDODRINE HYDROCHLORIDE 2.5 MG: 5 TABLET ORAL at 12:26

## 2023-07-07 RX ADMIN — GUAIFENESIN 400 MG: 400 TABLET ORAL at 21:25

## 2023-07-07 RX ADMIN — AMIODARONE HYDROCHLORIDE 200 MG: 200 TABLET ORAL at 10:00

## 2023-07-07 RX ADMIN — BUDESONIDE 500 MCG: 0.5 SUSPENSION RESPIRATORY (INHALATION) at 08:33

## 2023-07-07 RX ADMIN — MIDODRINE HYDROCHLORIDE 2.5 MG: 5 TABLET ORAL at 18:05

## 2023-07-07 RX ADMIN — IPRATROPIUM BROMIDE AND ALBUTEROL SULFATE 1 DOSE: .5; 2.5 SOLUTION RESPIRATORY (INHALATION) at 13:01

## 2023-07-07 RX ADMIN — APIXABAN 5 MG: 5 TABLET, FILM COATED ORAL at 10:00

## 2023-07-07 RX ADMIN — DULOXETINE 60 MG: 60 CAPSULE, DELAYED RELEASE ORAL at 10:00

## 2023-07-07 RX ADMIN — IPRATROPIUM BROMIDE AND ALBUTEROL SULFATE 1 DOSE: .5; 2.5 SOLUTION RESPIRATORY (INHALATION) at 16:05

## 2023-07-07 RX ADMIN — MUPIROCIN: 20 OINTMENT TOPICAL at 22:31

## 2023-07-07 RX ADMIN — ARFORMOTEROL TARTRATE 15 MCG: 15 SOLUTION RESPIRATORY (INHALATION) at 08:33

## 2023-07-07 RX ADMIN — APIXABAN 5 MG: 5 TABLET, FILM COATED ORAL at 20:57

## 2023-07-07 RX ADMIN — BUDESONIDE 500 MCG: 0.5 SUSPENSION RESPIRATORY (INHALATION) at 19:52

## 2023-07-07 RX ADMIN — LEVOTHYROXINE SODIUM 75 MCG: 0.07 TABLET ORAL at 09:59

## 2023-07-07 RX ADMIN — DESMOPRESSIN ACETATE 200 MCG: 0.1 TABLET ORAL at 10:00

## 2023-07-07 RX ADMIN — OXYCODONE AND ACETAMINOPHEN 1 TABLET: 5; 325 TABLET ORAL at 12:26

## 2023-07-07 RX ADMIN — VANCOMYCIN HYDROCHLORIDE 1000 MG: 1 INJECTION, POWDER, LYOPHILIZED, FOR SOLUTION INTRAVENOUS at 21:21

## 2023-07-07 RX ADMIN — LEVETIRACETAM 500 MG: 500 TABLET, FILM COATED ORAL at 20:57

## 2023-07-07 RX ADMIN — METOPROLOL SUCCINATE 25 MG: 25 TABLET, EXTENDED RELEASE ORAL at 10:00

## 2023-07-07 RX ADMIN — FUROSEMIDE 40 MG: 10 INJECTION, SOLUTION INTRAMUSCULAR; INTRAVENOUS at 10:01

## 2023-07-07 ASSESSMENT — PAIN SCALES - GENERAL
PAINLEVEL_OUTOF10: 0
PAINLEVEL_OUTOF10: 9
PAINLEVEL_OUTOF10: 0
PAINLEVEL_OUTOF10: 9
PAINLEVEL_OUTOF10: 0

## 2023-07-07 ASSESSMENT — PAIN DESCRIPTION - ORIENTATION
ORIENTATION: LEFT
ORIENTATION: LEFT;RIGHT

## 2023-07-07 ASSESSMENT — PAIN DESCRIPTION - DESCRIPTORS
DESCRIPTORS: ACHING;CRAMPING;CRUSHING;DISCOMFORT
DESCRIPTORS: ACHING;DISCOMFORT

## 2023-07-07 ASSESSMENT — PAIN DESCRIPTION - PAIN TYPE: TYPE: ACUTE PAIN

## 2023-07-07 ASSESSMENT — PAIN DESCRIPTION - ONSET: ONSET: GRADUAL

## 2023-07-07 ASSESSMENT — PAIN DESCRIPTION - LOCATION
LOCATION: SHOULDER;FOOT;LEG
LOCATION: LEG

## 2023-07-07 ASSESSMENT — PAIN DESCRIPTION - FREQUENCY: FREQUENCY: INTERMITTENT

## 2023-07-07 NOTE — PROGRESS NOTES
Patient transferring from ICU room 4405 to 8402, report called to floor RN, placed on telepack 8402, patient belongings consisting of cell phone, , shoes, clothing items, transported with patient.

## 2023-07-07 NOTE — PROGRESS NOTES
[facility] Inpatient   Resident Progress Note    S:  Hospital day: 2   Brief Synopsis: Fatuma Gutierrez is a 78 y.o. female with a PMH of combined congestive heart failure, CKD, pulmonary sarcoidosis, pulmonary hypertension,ventral hernia, diabetes insipidus, hypothyroidism, HTN, and chronic normocytic anemia who presented on 7/5/23 for severe shortness of breath and cough productive of green sputum. Patient was placed on AVAPS but was decompensating, having increased work of breathing and tachypnea. Patient also developed afib RVR (had been in sinus rhythm upon presentation). Workup in ED showed multifocal pneumonia. Patient was admitted to ICU with acute hypoxic respiratory failure 2/2 pneumonia.      Overnight/interim:   Patient was observed this morning off of BiPAP, able to speak multiple sentences without desaturating  She states that her shortness of breath has improved since admission   Mildly hypothermic but otherwise vital signs stable overnight   Reports mild anxiety but states that it is improving     Cont meds:    sodium chloride       Scheduled meds:    mupirocin   Each Nostril BID    budesonide  500 mcg Nebulization BID    arformoterol tartrate  15 mcg Nebulization BID    sodium chloride flush  5-40 mL IntraVENous 2 times per day    ipratropium 0.5 mg-albuterol 2.5 mg  1 Dose Inhalation Q4H WA    cefepime  2,000 mg IntraVENous Q12H    desmopressin  200 mcg Oral BID    DULoxetine  60 mg Oral Daily    apixaban  5 mg Oral BID    [Held by provider] hydrALAZINE  25 mg Oral 3 times per day    levETIRAcetam  500 mg Oral BID    levothyroxine  75 mcg Oral Daily    amiodarone  200 mg Oral Daily    metoprolol succinate  25 mg Oral BID    predniSONE  5 mg Oral Daily    furosemide  40 mg IntraVENous Daily    vancomycin  1,500 mg IntraVENous Q24H    digoxin  125 mcg IntraVENous Daily     PRN meds: sodium chloride flush, sodium chloride, ondansetron **OR** ondansetron, polyethylene glycol, acetaminophen

## 2023-07-07 NOTE — PROGRESS NOTES
[address]   [place], [place]       Date:2023                                                               Patient Name: Gloria Mcgovern  MRN: [id number]  : 1955  Room: [address] [place], [place] [address]A    Evaluating OT: Allyson Batista, CASEYD,  OTR/L; [id number]    Referring Provider: Darrell Jones DO   Specific Provider Orders/Date: OT eval and treat (23)       Diagnosis: Acute respiratory failure with hypoxia ([address]) [J96.01]  Acute respiratory failure with hypoxemia ([address]) [J96.01]  Pneumonia of left lower lobe due to infectious organism [J18.9]     Reason for admission: Pt admitted with SOB, pneumonia.     Surgery/Procedures: None this admission     Pertinent Medical History:    Past Medical History:   Diagnosis Date    A-fib [facility])     follows with Dr Tiffanie Seo    Abnormal mammogram     Acute on chronic respiratory failure ([address]) 2017    Anemia     Anemia due to chronic illness     Ankle fracture, left 2008    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    Backache     Benign hypertension     CHF (congestive heart failure) (HCC)     Chronic back pain     Chronic kidney disease     stage 3    Chronic pain disorder     Chronic, continuous use of opioids     Compression fracture of thoracic vertebra (HCC)     T10    COPD (chronic obstructive pulmonary disease) ([address])     Debility     Deformity of ankle and foot, acquired 2011    Depression     Diabetes insipidus ([address])     Diverticulitis     Dry eyes     Encephalopathy acute 2017    Gait disturbance     GERD (gastroesophageal reflux disease)     Hemorrhoids 2012    Hernia     History of blood transfusion approx 2017    History of Clostridium difficile     negative culture 12-15-15; resolved    Hyperlipidemia     Hypothyroidism     Ischemic colitis, enteritis, or enterocolitis ([address])     Long term (current) use of systemic

## 2023-07-07 NOTE — PROGRESS NOTES
4 Eyes Skin Assessment     NAME:  Mahogany Rowe  YOB: 1955  MEDICAL RECORD NUMBER:  [id number]    The patient is being assessed for  Transfer to New Unit    I agree that at least one RN has performed a thorough Head to Toe Skin Assessment on the patient. ALL assessment sites listed below have been assessed. Areas assessed by both nurses:    Head, Face, Ears, Shoulders, Back, Chest, Arms, Elbows, Hands, Sacrum. Buttock, Coccyx, Ischium, and Legs. Feet and Heels        Does the Patient have a Wound?  No noted wound(s)       Blas Prevention initiated by RN: Yes  Wound Care Orders initiated by RN: No    Pressure Injury (Stage 3,4, Unstageable, DTI, NWPT, and Complex wounds) if present, place Wound referral order by RN under : No    New Ostomies, if present place, Ostomy referral order under : No     Nurse 1 eSignature: Electronically signed by Colletta Pontiff, RN on 7/7/23 at 7:00 PM EDT    **SHARE this note so that the co-signing nurse can place an eSignature**    Nurse 2 eSignature: {Esignature:[id number]}

## 2023-07-07 NOTE — CARE COORDINATION
Care Coordination: LOS 2 Day. Met with pt to update plan of care, remains home with . States she is active with [facility]. She is uncertain of agency; she will check with . I called [facility] and last time active was Oct 2022. States she has home 02 and NIV from [facility], will need  to bring tank when time to transport home. Will update plan as needed. Addendum: Pt states she is active with [address], will need orders to resume- I have left a message for Keena at [facility], awaiting confirmation. Addendum: spoke to Keena from [place]. They were not notified at [facility] per [facility] and did not resume care, she will follow but will need [facility] orders    Electronically signed by Courtney Mac RN on 7/7/2023 at 10:50 AM     The Plan for Transition of Care is related to the following treatment goals: dc    The Patient was provided with a choice of provider and agrees   with the discharge plan. [x] Yes [] No    Freedom of choice list was provided with basic dialogue that supports the patient's individualized plan of care/goals, treatment preferences and shares the quality data associated with the providers.  [x] Yes [] No

## 2023-07-07 NOTE — PROGRESS NOTES
INPATIENT CARDIOLOGY FOLLOW-UP    Name: Lza Mcdonald    Age: 78 y.o. Date of Admission: 7/5/2023  6:26 AM    Date of Service: 7/7/2023    Chief Complaint: Follow-up for AF    Interim History:  No new overnight cardiac complaints. Today, she awake and  feeling better compared to yesterday and still feeling winded on BiPAP. Currently with no complaints of CP, palpitations, dizziness, or lightheadedness. AF with CVR on telemetry.     Review of Systems:   Cardiac: As per HPI  General: No fever, chills  Pulmonary: As per HPI  HEENT: No visual disturbances, difficult swallowing  GI: No nausea, vomiting  Endocrine: No thyroid disease or DM  Musculoskeletal: LESLIE x 4, no focal motor deficits  Skin: Intact, no rashes  Neuro/Psych: No headache or seizures    Problem List:  Patient Active Problem List   Diagnosis    Chronic back pain    Hypothyroidism    Nephrosclerosis    Diabetes insipidus ([address])    Pulmonary sarcoidosis (HCC)    Steroid-induced avascular necrosis of shoulder (HCC)    Anemia due to chronic illness    Chronic pain syndrome    Bilateral hip pain    Debility    BRBPR (bright red blood per rectum)    Severe pulmonary hypertension (HCC)    Chronic kidney disease, stage III (moderate) (HCC)    PAF (paroxysmal atrial fibrillation) (HCC) currently in NSR    Mixed hyperlipidemia    Goals of care, counseling/discussion    Atrial fibrillation with RVR (HCC)    Chronic combined systolic and diastolic heart failure (HCC)    Chronic respiratory failure with hypoxia (HCC)    Acute respiratory failure with hypoxia (HCC)    Mixed simple and mucopurulent chronic bronchitis (HCC)    Recurrent major depressive disorder, in partial remission (HCC)    Gait disturbance    Chest pain    Chronic, continuous use of opioids    PMB (postmenopausal bleeding)    Severe dysplasia of vagina    Epistaxis    Ventral hernia without obstruction or gangrene    Long term (current) use of systemic steroids    Nuclear senile cataract

## 2023-07-07 NOTE — PROGRESS NOTES
Physical Therapy  Physical Therapy Initial Assessment     Name: Lissett Sullivan  : 1955  MRN: [id number]      Date of Service: 2023    Evaluating PT:  Joe Lofton, PT, DPT  [id number]     Room #:  8380/0954-F  Diagnosis:  Acute respiratory failure with hypoxia ([address]) [J96.01]  Acute respiratory failure with hypoxemia ([address]) [J96.01]  Pneumonia of left lower lobe due to infectious organism [J18.9]  PMHx/PSHx:   has a past medical history of A-fib ([address]), Abnormal mammogram, Acute on chronic respiratory failure ([address]), Anemia, Anemia due to chronic illness, Ankle fracture, left, 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[address]), Backache, Benign hypertension, CHF (congestive heart failure) ([facility]), Chronic back pain, Chronic kidney disease, Chronic pain disorder, Chronic, continuous use of opioids, Compression fracture of thoracic vertebra ([facility]), COPD (chronic obstructive pulmonary disease) ([address]), Debility, Deformity of ankle and foot, acquired, Depression, Diabetes insipidus ([address]), Diverticulitis, Dry eyes, Encephalopathy acute, Gait disturbance, GERD (gastroesophageal reflux disease), Hemorrhoids, Hernia, History of blood transfusion, History of Clostridium difficile, Hyperlipidemia, Hypothyroidism, Ischemic colitis, enteritis, or enterocolitis ([address]), Long term (current) use of systemic steroids, Long-term current use of steroids, Lumbar disc herniation, Myalgia and myositis, unspecified, Nephrosclerosis, Nonunion of fracture, Osteoarthritis, PAF (paroxysmal atrial fibrillation) ([address]), 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[address]), Pulmonary hypertension ([address]), Pulmonary sarcoidosis ([address]), Rectal bleeding, Rhabdomyolysis, Steroid-induced avascular necrosis of shoulder ([address]), Tibia fracture, and Ventral hernia without obstruction or gangrene.   Procedure/Surgery:  None  Precautions:  Fall risk, O2, Contact precautions, NWB LLE pending further clarification  Equipment Needs:  TBD    SUBJECTIVE:    Pt lives with  in a 1

## 2023-07-07 NOTE — PROGRESS NOTES
Comprehensive Nutrition Assessment    Type and Reason for Visit:  Initial, Consult (ONS Recs)    Nutrition Recommendations/Plan:   Continue Diet. Will Start ONS and monitor. Malnutrition Assessment:  Malnutrition Status: Moderate malnutrition (07/07/23 1505)    Context:  Chronic Illness     Findings of the 6 clinical characteristics of malnutrition:  Energy Intake:  75% or less estimated energy requirements for 1 month or longer  Weight Loss:  Unable to assess     Body Fat Loss:  Mild body fat loss Orbital, Triceps   Muscle Mass Loss:  Mild muscle mass loss Temples (temporalis), Clavicles (pectoralis & deltoids), Scapula (trapezius)  Fluid Accumulation:  No significant fluid accumulation     Strength:  Not Performed    Nutrition Assessment:    Pt adm w/ SOB x ~2-3d pta. PMHx CHF, AF, PAH, pulmonary sarcoidosis/fibrosis on prednisone, COPD, CKD, diabetes insipidus, colectomy for ischemic colitis (08'), SBO (5/16), Sz (5/19), Moderate Malnutrition (6/15), w/ multiple recent hospitalizations. Last discharged from [facility] 6/28 after being tx from [facility] for ARF/worsening pulmonary hypertension,  found w/ right IJ DVT. Adm w/ ARF likely 2/2 multifocal PNA, AF/RVR, IRMA. At risk d/t currently meets criteria for Moderate Malnutrition w/ increased needs 2/2 COPD hx. Will Start ONS and monitor. Nutrition Related Findings:    A&O, poor dentition, distended/nontender Abd, +BS, trace edema, I/O's WNL, elevated BUN/Cr Wound Type: None       Current Nutrition Intake & Therapies:    Average Meal Intake: 51-75%  Average Supplements Intake: None Ordered  ADULT DIET; Regular; 4 carb choices (60 gm/meal)    Anthropometric Measures:  Height: 5' 5\" (165.1 cm)  Ideal Body Weight (IBW): 125 lbs (57 kg)    Admission Body Weight: 146 lb (66.2 kg) (bed 7/6)  Current Body Weight: 153 lb (69.4 kg) (bed 7/7),   IBW.  Weight Source: Bed Scale  Current BMI (kg/m2): 25.5  Usual Body Weight: 158 lb (71.7 kg) (bed 2/5/23 per EMR; BAYRON wt

## 2023-07-07 NOTE — PLAN OF CARE
Problem: Chronic Conditions and Co-morbidities  Goal: Patient's chronic conditions and co-morbidity symptoms are monitored and maintained or improved  7/7/2023 0021 by Aniya Williamson RN  Outcome: Progressing  Flowsheets (Taken 7/6/2023 2000 by Lena Raman, RN)  Care Plan - Patient's Chronic Conditions and Co-Morbidity Symptoms are Monitored and Maintained or Improved:   Monitor and assess patient's chronic conditions and comorbid symptoms for stability, deterioration, or improvement   Collaborate with multidisciplinary team to address chronic and comorbid conditions and prevent exacerbation or deterioration   Update acute care plan with appropriate goals if chronic or comorbid symptoms are exacerbated and prevent overall improvement and discharge     Problem: Discharge Planning  Goal: Discharge to home or other facility with appropriate resources  7/7/2023 0021 by Aniya Williamson RN  Outcome: Progressing  Flowsheets (Taken 7/6/2023 2000 by Lena Raman, RN)  Discharge to home or other facility with appropriate resources:   Identify barriers to discharge with patient and caregiver   Arrange for needed discharge resources and transportation as appropriate   Identify discharge learning needs (meds, wound care, etc)   Arrange for interpreters to assist at discharge as needed   Refer to discharge planning if patient needs post-hospital services based on physician order or complex needs related to functional status, cognitive ability or social support system     Problem: Pain  Goal: Verbalizes/displays adequate comfort level or baseline comfort level  7/7/2023 0021 by Aniya Williamson RN  Outcome: Progressing  Flowsheets (Taken 7/6/2023 2000 by Lena Raman, RN)  Verbalizes/displays adequate comfort level or baseline comfort level:   Encourage patient to monitor pain and request assistance   Assess pain using appropriate pain scale   Administer analgesics based on type and severity of pain and

## 2023-07-07 NOTE — PROGRESS NOTES
Pharmacy Consultation Note  (Antibiotic Dosing and Monitoring)    Initial consult date: 7/5/23  Consulting physician/provider: Dr. Logan Yanez  Drug: Vancomycin  Indication: HAP; 7 days    Age/  Gender Height Weight IBW  Allergy Information   67 y.o./female 5' 5\" (165.1 cm) 165 lb (74.8 kg)     Ideal body weight: 57 kg (125 lb 10.6 oz)  Adjusted ideal body weight: 62.1 kg (136 lb 15.2 oz)   Patient has no known allergies. Renal Function:  Recent Labs     07/05/23  0716 07/06/23  0423 07/07/23  0446   BUN 24* 26* 31*   CREATININE 1.1* 1.1* 1.4*         Intake/Output Summary (Last 24 hours) at 7/7/2023 1013  Last data filed at 7/7/2023 0200  Gross per 24 hour   Intake 1283.93 ml   Output 1150 ml   Net 133.93 ml         Vancomycin Monitoring:  Trough:  No results for input(s): VANCOTROUGH in the last 72 hours. Random:    Recent Labs     07/07/23  0446   VANCORANDOM 30.0       No results for input(s): Wallene Fee in the last 72 hours. Historical Cultures:  Organism   Date Value Ref Range Status   07/05/2023 MRSA nasal colonization (A)  Final     No results for input(s): BC in the last 72 hours. Vancomycin Administration Times:    Recent vancomycin administrations                     vancomycin 1500 mg in sodium chloride 0.9% 300 mL IVPB (mg) 1,500 mg New Bag 07/06/23 1744    vancomycin (VANCOCIN) 1,750 mg in sodium chloride 0.9 % 500 mL IVPB (mg) 1,750 mg New Bag 07/05/23 1722                  Assessment:  Patient is a 78 y.o. female who has been initiated on vancomycin  Estimated Creatinine Clearance: 39 mL/min (A) (based on SCr of 1.4 mg/dL (H)).   To dose vancomycin, pharmacy will be utilizing [employer] calculation software for goal AUC/-600 mg/L-hr (predicted AUC/ALE = 532, Tr =15.6)  7/6: Scr remains 1.1, UOP 0.6 mL/kg/hr  7/7: Scr increased to 1.4, UOP 0.7 mL/kg/hr, random level is 30 mcg/mL (~11 hours post-dose)    Plan:  Decrease dose to vancomycin 1000 mg IV q 24 hr   Random level will be

## 2023-07-08 ENCOUNTER — APPOINTMENT (OUTPATIENT)
Dept: GENERAL RADIOLOGY | Age: 68
End: 2023-07-08
Payer: MEDICARE

## 2023-07-08 LAB
ALBUMIN SERPL-MCNC: 3.8 G/DL (ref 3.5–5.2)
ALP SERPL-CCNC: 92 U/L (ref 35–104)
ALT SERPL-CCNC: 14 U/L (ref 0–32)
ANION GAP SERPL CALCULATED.3IONS-SCNC: 10 MMOL/L (ref 7–16)
ANISOCYTOSIS: ABNORMAL
AST SERPL-CCNC: 22 U/L (ref 0–31)
BASOPHILIC STIPPLING: ABNORMAL
BASOPHILS # BLD: 0 E9/L (ref 0–0.2)
BASOPHILS NFR BLD: 0.1 % (ref 0–2)
BILIRUB SERPL-MCNC: 0.4 MG/DL (ref 0–1.2)
BUN SERPL-MCNC: 25 MG/DL (ref 6–23)
BURR CELLS: ABNORMAL
CALCIUM SERPL-MCNC: 9.2 MG/DL (ref 8.6–10.2)
CHLORIDE SERPL-SCNC: 98 MMOL/L (ref 98–107)
CO2 SERPL-SCNC: 33 MMOL/L (ref 22–29)
CREAT SERPL-MCNC: 1.2 MG/DL (ref 0.5–1)
EOSINOPHIL # BLD: 0.18 E9/L (ref 0.05–0.5)
EOSINOPHIL NFR BLD: 2.6 % (ref 0–6)
ERYTHROCYTE [DISTWIDTH] IN BLOOD BY AUTOMATED COUNT: 15 FL (ref 11.5–15)
GLUCOSE SERPL-MCNC: 97 MG/DL (ref 74–99)
HCT VFR BLD AUTO: 35.7 % (ref 34–48)
HGB BLD-MCNC: 10.3 G/DL (ref 11.5–15.5)
HYPOCHROMIA: ABNORMAL
LYMPHOCYTES # BLD: 0.63 E9/L (ref 1.5–4)
LYMPHOCYTES NFR BLD: 8.7 % (ref 20–42)
MCH RBC QN AUTO: 26.7 PG (ref 26–35)
MCHC RBC AUTO-ENTMCNC: 28.9 % (ref 32–34.5)
MCV RBC AUTO: 92.5 FL (ref 80–99.9)
MONOCYTES # BLD: 0.49 E9/L (ref 0.1–0.95)
MONOCYTES NFR BLD: 7 % (ref 2–12)
NEUTROPHILS # BLD: 5.74 E9/L (ref 1.8–7.3)
NEUTS SEG NFR BLD: 81.7 % (ref 43–80)
PLATELET # BLD AUTO: 163 E9/L (ref 130–450)
PMV BLD AUTO: 11.5 FL (ref 7–12)
POIKILOCYTES: ABNORMAL
POLYCHROMASIA: ABNORMAL
POTASSIUM SERPL-SCNC: 4.2 MMOL/L (ref 3.5–5)
PROT SERPL-MCNC: 7.3 G/DL (ref 6.4–8.3)
RBC # BLD AUTO: 3.86 E12/L (ref 3.5–5.5)
SODIUM SERPL-SCNC: 141 MMOL/L (ref 132–146)
WBC # BLD: 7 E9/L (ref 4.5–11.5)

## 2023-07-08 PROCEDURE — 6360000002 HC RX W HCPCS: Performed by: INTERNAL MEDICINE

## 2023-07-08 PROCEDURE — 73590 X-RAY EXAM OF LOWER LEG: CPT

## 2023-07-08 PROCEDURE — 71045 X-RAY EXAM CHEST 1 VIEW: CPT

## 2023-07-08 PROCEDURE — 6370000000 HC RX 637 (ALT 250 FOR IP)

## 2023-07-08 PROCEDURE — 85025 COMPLETE CBC W/AUTO DIFF WBC: CPT

## 2023-07-08 PROCEDURE — 94660 CPAP INITIATION&MGMT: CPT

## 2023-07-08 PROCEDURE — 2580000003 HC RX 258

## 2023-07-08 PROCEDURE — 6370000000 HC RX 637 (ALT 250 FOR IP): Performed by: INTERNAL MEDICINE

## 2023-07-08 PROCEDURE — 99232 SBSQ HOSP IP/OBS MODERATE 35: CPT | Performed by: FAMILY MEDICINE

## 2023-07-08 PROCEDURE — 6360000002 HC RX W HCPCS

## 2023-07-08 PROCEDURE — 2700000000 HC OXYGEN THERAPY PER DAY

## 2023-07-08 PROCEDURE — 80053 COMPREHEN METABOLIC PANEL: CPT

## 2023-07-08 PROCEDURE — 36415 COLL VENOUS BLD VENIPUNCTURE: CPT

## 2023-07-08 PROCEDURE — 94640 AIRWAY INHALATION TREATMENT: CPT

## 2023-07-08 PROCEDURE — 1200000000 HC SEMI PRIVATE

## 2023-07-08 RX ORDER — OXYCODONE HYDROCHLORIDE 5 MG/1
2.5 TABLET ORAL ONCE
Status: COMPLETED | OUTPATIENT
Start: 2023-07-08 | End: 2023-07-08

## 2023-07-08 RX ADMIN — ARFORMOTEROL TARTRATE 15 MCG: 15 SOLUTION RESPIRATORY (INHALATION) at 08:17

## 2023-07-08 RX ADMIN — APIXABAN 5 MG: 5 TABLET, FILM COATED ORAL at 21:03

## 2023-07-08 RX ADMIN — LEVETIRACETAM 500 MG: 500 TABLET, FILM COATED ORAL at 21:03

## 2023-07-08 RX ADMIN — MUPIROCIN: 20 OINTMENT TOPICAL at 21:03

## 2023-07-08 RX ADMIN — GUAIFENESIN 400 MG: 400 TABLET ORAL at 18:10

## 2023-07-08 RX ADMIN — APIXABAN 5 MG: 5 TABLET, FILM COATED ORAL at 09:10

## 2023-07-08 RX ADMIN — ARFORMOTEROL TARTRATE 15 MCG: 15 SOLUTION RESPIRATORY (INHALATION) at 19:21

## 2023-07-08 RX ADMIN — FUROSEMIDE 40 MG: 40 TABLET ORAL at 09:11

## 2023-07-08 RX ADMIN — DESMOPRESSIN ACETATE 200 MCG: 0.1 TABLET ORAL at 21:03

## 2023-07-08 RX ADMIN — BUDESONIDE 500 MCG: 0.5 SUSPENSION RESPIRATORY (INHALATION) at 19:21

## 2023-07-08 RX ADMIN — PREDNISONE 5 MG: 5 TABLET ORAL at 09:11

## 2023-07-08 RX ADMIN — CEFEPIME 2000 MG: 2 INJECTION, POWDER, FOR SOLUTION INTRAVENOUS at 12:42

## 2023-07-08 RX ADMIN — IPRATROPIUM BROMIDE AND ALBUTEROL SULFATE 1 DOSE: .5; 2.5 SOLUTION RESPIRATORY (INHALATION) at 12:28

## 2023-07-08 RX ADMIN — BUDESONIDE 500 MCG: 0.5 SUSPENSION RESPIRATORY (INHALATION) at 08:18

## 2023-07-08 RX ADMIN — AMIODARONE HYDROCHLORIDE 200 MG: 200 TABLET ORAL at 09:11

## 2023-07-08 RX ADMIN — DIGOXIN 125 MCG: 250 INJECTION, SOLUTION INTRAMUSCULAR; INTRAVENOUS at 09:10

## 2023-07-08 RX ADMIN — OXYCODONE HYDROCHLORIDE 2.5 MG: 5 TABLET ORAL at 10:59

## 2023-07-08 RX ADMIN — METOPROLOL SUCCINATE 25 MG: 25 TABLET, EXTENDED RELEASE ORAL at 09:11

## 2023-07-08 RX ADMIN — MUPIROCIN: 20 OINTMENT TOPICAL at 09:11

## 2023-07-08 RX ADMIN — IPRATROPIUM BROMIDE AND ALBUTEROL SULFATE 1 DOSE: .5; 2.5 SOLUTION RESPIRATORY (INHALATION) at 19:21

## 2023-07-08 RX ADMIN — IPRATROPIUM BROMIDE AND ALBUTEROL SULFATE 1 DOSE: .5; 2.5 SOLUTION RESPIRATORY (INHALATION) at 15:33

## 2023-07-08 RX ADMIN — METOPROLOL SUCCINATE 25 MG: 25 TABLET, EXTENDED RELEASE ORAL at 21:03

## 2023-07-08 RX ADMIN — LEVETIRACETAM 500 MG: 500 TABLET, FILM COATED ORAL at 09:12

## 2023-07-08 RX ADMIN — IPRATROPIUM BROMIDE AND ALBUTEROL SULFATE 1 DOSE: .5; 2.5 SOLUTION RESPIRATORY (INHALATION) at 08:16

## 2023-07-08 RX ADMIN — SODIUM CHLORIDE, PRESERVATIVE FREE 10 ML: 5 INJECTION INTRAVENOUS at 21:04

## 2023-07-08 RX ADMIN — VANCOMYCIN HYDROCHLORIDE 1000 MG: 1 INJECTION, POWDER, LYOPHILIZED, FOR SOLUTION INTRAVENOUS at 21:09

## 2023-07-08 RX ADMIN — MIDODRINE HYDROCHLORIDE 2.5 MG: 5 TABLET ORAL at 18:10

## 2023-07-08 RX ADMIN — CEFEPIME 2000 MG: 2 INJECTION, POWDER, FOR SOLUTION INTRAVENOUS at 00:40

## 2023-07-08 RX ADMIN — MIDODRINE HYDROCHLORIDE 2.5 MG: 5 TABLET ORAL at 09:10

## 2023-07-08 RX ADMIN — DULOXETINE 60 MG: 60 CAPSULE, DELAYED RELEASE ORAL at 09:22

## 2023-07-08 RX ADMIN — SODIUM CHLORIDE, PRESERVATIVE FREE 10 ML: 5 INJECTION INTRAVENOUS at 09:13

## 2023-07-08 RX ADMIN — DESMOPRESSIN ACETATE 200 MCG: 0.1 TABLET ORAL at 09:22

## 2023-07-08 RX ADMIN — LEVOTHYROXINE SODIUM 75 MCG: 0.07 TABLET ORAL at 09:11

## 2023-07-08 RX ADMIN — MIDODRINE HYDROCHLORIDE 2.5 MG: 5 TABLET ORAL at 12:37

## 2023-07-08 ASSESSMENT — PAIN DESCRIPTION - LOCATION
LOCATION: SHOULDER;LEG
LOCATION: SHOULDER;LEG
LOCATION: LEG

## 2023-07-08 ASSESSMENT — PAIN DESCRIPTION - PAIN TYPE
TYPE: ACUTE PAIN
TYPE: ACUTE PAIN

## 2023-07-08 ASSESSMENT — PAIN DESCRIPTION - FREQUENCY
FREQUENCY: INTERMITTENT
FREQUENCY: CONTINUOUS

## 2023-07-08 ASSESSMENT — PAIN SCALES - GENERAL
PAINLEVEL_OUTOF10: 9
PAINLEVEL_OUTOF10: 0
PAINLEVEL_OUTOF10: 10

## 2023-07-08 ASSESSMENT — PAIN DESCRIPTION - ONSET
ONSET: ON-GOING
ONSET: GRADUAL

## 2023-07-08 ASSESSMENT — PAIN SCALES - WONG BAKER: WONGBAKER_NUMERICALRESPONSE: 0

## 2023-07-08 ASSESSMENT — PAIN DESCRIPTION - DESCRIPTORS
DESCRIPTORS: DISCOMFORT;SORE
DESCRIPTORS: ACHING;DISCOMFORT;THROBBING
DESCRIPTORS: DISCOMFORT;SORE;SHARP

## 2023-07-08 ASSESSMENT — PAIN DESCRIPTION - ORIENTATION
ORIENTATION: RIGHT;LEFT
ORIENTATION: RIGHT;LEFT
ORIENTATION: LEFT

## 2023-07-08 NOTE — PLAN OF CARE
Problem: Chronic Conditions and Co-morbidities  Goal: Patient's chronic conditions and co-morbidity symptoms are monitored and maintained or improved  7/8/2023 1620 by Fredy Vieira RN  Outcome: Progressing     Problem: Discharge Planning  Goal: Discharge to home or other facility with appropriate resources  7/8/2023 1620 by Fredy Vieira RN  Outcome: Progressing     Problem: Pain  Goal: Verbalizes/displays adequate comfort level or baseline comfort level  7/8/2023 1620 by Fredy Vieira RN  Outcome: Progressing     Problem: Safety - Adult  Goal: Free from fall injury  7/8/2023 1620 by Fredy Vieiar RN  Outcome: Progressing     Problem: Skin/Tissue Integrity  Goal: Absence of new skin breakdown  Description: 1. Monitor for areas of redness and/or skin breakdown  2. Assess vascular access sites hourly  3. Every 4-6 hours minimum:  Change oxygen saturation probe site  4. Every 4-6 hours:  If on nasal continuous positive airway pressure, respiratory therapy assess nares and determine need for appliance change or resting period.   7/8/2023 1620 by Fredy Vieira RN  Outcome: Progressing  7/8/2023 0408 by Maylin Ca RN  Outcome: Progressing     Problem: ABCDS Injury Assessment  Goal: Absence of physical injury  7/8/2023 1620 by Fredy Vieira RN  Outcome: Progressing     Problem: Nutrition Deficit:  Goal: Optimize nutritional status  7/8/2023 1620 by Fredy Vieira RN  Outcome: Progressing

## 2023-07-08 NOTE — CONSULTS
Department of Internal Medicine  Nephrology Consult Note      Reason for Consult: DI and IRMA  Requesting Physician:  Hugo Samayoa DO      CHIEF COMPLAINT:  Shortness of Breath (PT reports SOB starting 2h ago while laying down. Didn't do any home nebs. Per EMS upon their arrival pt 80% on home 4L NC; pt sp02 >92% on 6L NC. Pt has PMH COPD)    History Obtained From: patient, EMR    HISTORY OF PRESENT ILLNESS: Briefly Mrs. Alec Simmonds is a 78 y.o. 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, HTN, adrenal insufficiency, permanent AF on apixaban, HFpEF (6Gnohemy Rincon MD0% July 2022), COPD, colitis, hypothyroidism, recently admitted with new onset seizures, NSTEMI and IRMA, who recently underwent a right heart catheterization at [facility] on June 22, 2023 and was found to have combined pre and postcapillary pulmonary hypertension with a negative response to nitric oxide administration, she has well was found to have a right IJ thrombosis but she was deemed to be high risk for anticoagulation, who presented to the ED on 7/5/2023 with a chief complaint of shortness of breath. Chest x-ray showed mild bibasilar atelectasis. proBNP was 4314. Her creatinine level on admission was 1.1 mg/dL with a BUN of 24. Her home medications included Lasix 20 mg p.o. daily, DDAVP 200 mcg twice a day.                 Past Medical History:        Diagnosis Date    A-fib [facility])     follows with Dr Wen Bustamante    Abnormal mammogram 2010    Acute on chronic respiratory failure ([address]) 05/05/2017    Anemia     Anemia due to chronic illness     Ankle fracture, left 2008    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[address]) 03/26/2007    Backache     Benign hypertension     CHF (congestive heart failure) (HCC)     Chronic back pain     Chronic kidney disease     stage 3    Chronic pain disorder     Chronic, continuous use of opioids     Compression fracture of thoracic vertebra
Department of Orthopedic Trauma Surgery  Resident consult note      CHIEF COMPLAINT:   Chief Complaint   Patient presents with    Shortness of Breath     PT reports SOB starting 2h ago while laying down. Didn't do any home nebs. Per EMS upon their arrival pt 80% on home 4L NC; pt sp02 >92% on 6L NC. Pt has PMH COPD       HISTORY OF PRESENT ILLNESS:                Patient is a 78 y.o. female who is currently admitted for respiratory problems. She complains of left lower leg pain and difficulty with ambulation secondary to this pain. Patient notes a few falls in the recent days however it did not have any new acute pain from these. She does not recall any falls or injuries approximately 3 to 4 months ago. She does note she has a chronic deformity about her left foot. She uses a walker for ambulation at baseline. Denies numbness/tingling/paresthesias. Denies any other orthopedic complaints at this time.          Past Medical History:        Diagnosis Date    A-fib [facility])     follows with Dr Mariel Levy    Abnormal mammogram 2010    Acute on chronic respiratory failure ([address]) 05/05/2017    Anemia     Anemia due to chronic illness     Ankle fracture, left 2008    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[address]) 03/26/2007    Backache     Benign hypertension     CHF (congestive heart failure) (HCC)     Chronic back pain     Chronic kidney disease     stage 3    Chronic pain disorder     Chronic, continuous use of opioids     Compression fracture of thoracic vertebra (HCC)     T10    COPD (chronic obstructive pulmonary disease) ([address])     Debility     Deformity of ankle and foot, acquired 01/31/2011    Depression     Diabetes insipidus ([address])     Diverticulitis     Dry eyes     Encephalopathy acute 05/05/2017    Gait disturbance     GERD (gastroesophageal reflux disease)     Hemorrhoids 01/13/2012    Hernia     History of blood transfusion approx 05/2017    History of Clostridium difficile     negative culture 12-15-15; resolved
INPATIENT CARDIOLOGY CONSULT     Reason for Consult: AF RVR    Cardiologist: Dr. Rigoberto Reis    Requesting Physician: Dr. Nghia Malhotra     Date of Consultation: 7/5/2023    HISTORY OF PRESENT ILLNESS:   Patient is a 78year old female known to Dr. Lloyd Snyder. She has a complex past medical history as detailed below. RECENT ENCOUNTERS:  Admission [facility] May 2023 with abdominal pain. SBO found, NG tube placed by General Surgery. Cardiology consulted for elevated troponin, felt to be due to demand ischemia. Found to have UTI, and witnessed newly diagnosed seizures in the ED -- as well as acute hypoxic/hypercapnic respiratory failure requiring intubation (eventually extubated and required BiPAP). She underwent RHC, revealing severe pulmonary hypertension/with negative vaso-reactivity challenge reported, and TTE with RV dysfunction. Required Epoprostenol. Intermittent AF RVR throughout her hospital stay --> started on IV Amiodarone with transition to PO.  BB and Cardizem discontinued in setting of RV dysfunction. Felt to be a poor candidate for [facility] in setting of GI Bleed history, epistaxis and recurrent acute anemia requiring transfusion. Required transfusion during hospital stay --> negative FOBT with General Surgery deeming anemia likely multifactorial (blood draws, dilution, acute illness) and recommendations for no further testing. Discharged to SNF 6/9/2023 on the following cardiac medications: Amiodarone 200 mg daily, Hydralazine 25 mg TID, Toprol-XL 12.5 mg daily. Re-admitted [facility] [place] [facility], 2023 with shortness of breath and hypoxia on 10 L HF NC. Productive cough. Felt to have acute on chronic HFpEF. Pulmonology recommended transfer to pulmonary hypertension center/[facility]. Noted to have IRMA, episodes of RVR. Underwent IV diuresis. Noted to be back on Toprol-XL (discharged on medication). Cardiology consulted, agreed with transfer to tertiary care center. Transferred to [facility].   Underwent repeat RHC, with
Rollene Brunner, M.D.,[facility]  Kolby Be D.O., F.A.C.O.I., Francesco Duran M.D. Belia Stroud M.D. Collin Fenton D.O. Patient:  Cathyann Lanes 78 y.o. female MRN: [id number]     Date of Service: 7/5/2023      PULMONARY CONSULTATION    Reason for Consultation: Pneumonia  Referring Physician: Dr Rik Mcadams MD    Communication with the referring physician will be sent via the electronic medical record. Chief Complaint: Shortness of breath    CODE STATUS: Full    SUBJECTIVE:  HPI:  Cathyann Lanes is a 78 y.o. AA female who we are asked to evaluate for possible pneumonia. She had been in [facility] on 6/17-6/28 for valuation of severe pulmonary hypertension and treatment of right upper lobe pneumonia. She is well-known to our pulmonary service followed during multiple hospital admissions and in office. Her past medical history includes  Pulmonary Sarcardoisis (dx 1998), COPD (on 6-8L NC), DM II, Diabetes Insipidus, HTN, Depression, Atrial fibrillation (states stopped taking AC), Hypothyroidism, GERD, hx Ischemic Colitis (s/p colectomy) and CKD who presents from OSH for further management of acute hypoxic respiratory failure and suspected/worsening pulmonary HTN. She underwent repeat right heart catheterization at [facility] on 6/22/2023 which found combined pre and postcapillary pH with borderline preserved cardiac index. Negative nitric oxide response. She was found to have a right IJ thrombus. M PAP decreased from 56 mmHg to 50 mmHg in response to nitric oxide. Cardiac index paradoxically decreased from 2.5 L to 2.0. PVR increased 7.80 POSEY to 9.41 POSEY, while the PAWP decreased from 25 to 18 mmHg. The worsening PVR was due to combination of reduced CO and reduction in PAWP. Recommendations at [facility] was to continue ongoing optimization of heart failure with current diuretic regimen and low-sodium diet. BP control.   Considering initiation of PAH therapy once volume status is optimized given
cervical adenopathy. Skin:     General: Skin is warm and dry. Findings: No rash. Neurological:      General: No focal deficit present. Mental Status: She is easily aroused. She is lethargic. Psychiatric:         Behavior: Behavior normal.       PERTINENT LAB RESULTS: Labs reviewed. DIAGNOSTICS:  Pertinent imaging reviewed. ASSESMENT/PLAN:     Chronic respiratory failure with hypoxia and hypercapnia  Multilobar pneumonia  Severe pulmonary hypertension pre and postcapillary with borderline preserved cardiac index. Negative nitric oxide response. New right IJ thrombus  Pulmonary sarcoidosis. States more diagnosed by lung biopsy 2007 patient on chronic daily prednisone 5 mg  Diabetes insipidus  Prior abdominal wall hematoma  Stage IIIa chronic kidney disease  Moderate Gold stage II COPD  Anemia  History of GI bleed  Moderate protein calorie malnutrition  Chronic home O2 dependence-4 L  Heart failure with preserved EF  PAF  Seizures  History of PE  History of ischemic colitis with colectomy  Multiple hospital admissions     Plan:  Continue supplemental oxygen to maintain SPO2 between 88-90%  Repeat ABG, continue NIV adjust accordingly  Close monitoring and if worsens may need intubation  Diuresis as tolerated, given history of DI we will consult nephrology  Infectious work-up in progress  Continue cefepime and Vanco  Eliquis    Reviewed with spouse at bedside      Code Status: Full Code    Total critical care time spent reviewing chart records and managing patient at the bedside is 38 minutes exclusive of procedures or overlap.     ELECTRONICALLY SIGNED:  Patrick Mora DO

## 2023-07-08 NOTE — PROGRESS NOTES
deviation. Cardiovascular:      Rate and Rhythm: Normal rate and regular rhythm. Heart sounds: Normal heart sounds. Pulmonary:      Breath sounds: Decreased air movement present. Decreased breath sounds present. Abdominal:      General: Bowel sounds are normal.      Palpations: Abdomen is soft. Tenderness: There is no abdominal tenderness. Musculoskeletal:         General: Swelling present. Cervical back: Neck supple. Lymphadenopathy:      Cervical: No cervical adenopathy. Skin:     General: Skin is warm and dry. Findings: No rash. Neurological:      General: No focal deficit present. Mental Status: She is alert and easily aroused. Psychiatric:         Behavior: Behavior normal.       Pertinent/ New Labs and Imaging Studies     Pulmonary Function Testing personally reviewed and interpreted. PERTINENT LAB RESULTS: Labs reviewed. DIAGNOSTICS: Pertinent imaging reviewed. Assessment:      Chronic respiratory failure with hypoxia and hypercapnia  Multilobar pneumonia  Severe pulmonary hypertension pre and postcapillary with borderline preserved cardiac index. Negative nitric oxide response. New right IJ thrombus  Pulmonary sarcoidosis.   States more diagnosed by lung biopsy 2007 patient on chronic daily prednisone 5 mg  Diabetes insipidus  Prior abdominal wall hematoma  Stage IIIa chronic kidney disease  Moderate Gold stage II COPD  Anemia  History of GI bleed  Moderate protein calorie malnutrition  Chronic home O2 dependence-4 L  Heart failure with preserved EF  PAF  History of PE  Multiple hospital admissions       Plan:     Continue supplemental oxygen to maintain SPO2 between 88-92%  Continue NIV -AVAPS QHS and cycle during day  Diuresis as tolerated, nephro follow  Continue chronic prednisone 5 mg  Continue cefepime and Vanco  Eliquis  pHTN w/u started at [facility] will need f/u once stabilized      Thank you for allowing me to participate in the care of Rebekah NICA

## 2023-07-08 NOTE — PROGRESS NOTES
Pharmacy Consultation Note  (Antibiotic Dosing and Monitoring)    Initial consult date: 7/5/23  Consulting physician/provider: Dr. Nils Stock  Drug: Vancomycin  Indication: HAP; 7 days    Age/  Gender Height Weight IBW  Allergy Information   67 y.o./female 5' 5\" (165.1 cm) 165 lb (74.8 kg)     Ideal body weight: 57 kg (125 lb 10.6 oz)  Adjusted ideal body weight: 62.3 kg (137 lb 7 oz)   Patient has no known allergies. Renal Function:  Recent Labs     07/06/23  0423 07/07/23  0446 07/07/23  1459   BUN 26* 31* 28*   CREATININE 1.1* 1.4* 1.3*         Intake/Output Summary (Last 24 hours) at 7/8/2023 1153  Last data filed at 7/8/2023 0600  Gross per 24 hour   Intake 250 ml   Output 200 ml   Net 50 ml         Vancomycin Monitoring:  Trough:  No results for input(s): VANCOTROUGH in the last 72 hours. Random:    Recent Labs     07/07/23  0446   VANCORANDOM 30.0         Recent Labs     07/06/23  1104   BLOODCULT2 24 Hours no growth        Historical Cultures:  Organism   Date Value Ref Range Status   07/05/2023 MRSA nasal colonization (A)  Final     Recent Labs     07/06/23  1112   BC 24 Hours no growth       Vancomycin Administration Times:  Recent vancomycin administrations                     vancomycin (VANCOCIN) 1,000 mg in sodium chloride 0.9 % 250 mL IVPB ([id number]) (mg) 1,000 mg New Bag 07/07/23 2121    vancomycin 1500 mg in sodium chloride 0.9% 300 mL IVPB (mg) 1,500 mg New Bag 07/06/23 1744    vancomycin (VANCOCIN) 1,750 mg in sodium chloride 0.9 % 500 mL IVPB (mg) 1,750 mg New Bag 07/05/23 1722                    Assessment:  Patient is a 78 y.o. female who has been initiated on vancomycin  Estimated Creatinine Clearance: 42 mL/min (A) (based on SCr of 1.3 mg/dL (H)).   To dose vancomycin, pharmacy will be utilizing [employer] calculation software for goal AUC/-600 mg/L-hr (predicted AUC/ALE = 532, Tr =15.6)  7/6: Scr remains 1.1, UOP 0.6 mL/kg/hr  7/7: Scr increased to 1.4, UOP 0.7 mL/kg/hr, random level

## 2023-07-08 NOTE — PLAN OF CARE
Problem: Chronic Conditions and Co-morbidities  Goal: Patient's chronic conditions and co-morbidity symptoms are monitored and maintained or improved  Outcome: Progressing  Flowsheets (Taken 7/7/2023 2045)  Care Plan - Patient's Chronic Conditions and Co-Morbidity Symptoms are Monitored and Maintained or Improved: Monitor and assess patient's chronic conditions and comorbid symptoms for stability, deterioration, or improvement     Problem: Discharge Planning  Goal: Discharge to home or other facility with appropriate resources  Outcome: Progressing  Flowsheets (Taken 7/7/2023 2045)  Discharge to home or other facility with appropriate resources: Identify barriers to discharge with patient and caregiver     Problem: Pain  Goal: Verbalizes/displays adequate comfort level or baseline comfort level  Outcome: Progressing  Flowsheets  Taken 7/7/2023 2230  Verbalizes/displays adequate comfort level or baseline comfort level: Encourage patient to monitor pain and request assistance  Taken 7/7/2023 2109  Verbalizes/displays adequate comfort level or baseline comfort level: Encourage patient to monitor pain and request assistance     Problem: Safety - Adult  Goal: Free from fall injury  Outcome: Progressing  Flowsheets (Taken 7/7/2023 2106)  Free From Fall Injury: Instruct family/caregiver on patient safety     Problem: Skin/Tissue Integrity  Goal: Absence of new skin breakdown  Description: 1. Monitor for areas of redness and/or skin breakdown  2. Assess vascular access sites hourly  3. Every 4-6 hours minimum:  Change oxygen saturation probe site  4. Every 4-6 hours:  If on nasal continuous positive airway pressure, respiratory therapy assess nares and determine need for appliance change or resting period.   Outcome: Progressing     Problem: ABCDS Injury Assessment  Goal: Absence of physical injury  Outcome: Progressing  Flowsheets (Taken 7/7/2023 2106)  Absence of Physical Injury: Implement safety measures based on

## 2023-07-08 NOTE — PROGRESS NOTES
Date: 7/7/2023    Time: 10:01 PM    Patient Placed On BIPAP/CPAP/ Non-Invasive Ventilation? Yes    If no must comment. Facial area red/color change? No           If YES are Blister/Lesion present? No   If yes must notify nursing staff  BIPAP/CPAP skin barrier?   Yes    Skin barrier type:mepilexlite       Comments:        Janice Jordan RCP

## 2023-07-09 ENCOUNTER — APPOINTMENT (OUTPATIENT)
Dept: GENERAL RADIOLOGY | Age: 68
End: 2023-07-09
Payer: MEDICARE

## 2023-07-09 LAB
ALBUMIN SERPL-MCNC: 3.2 G/DL (ref 3.5–5.2)
ALP SERPL-CCNC: 76 U/L (ref 35–104)
ALT SERPL-CCNC: 12 U/L (ref 0–32)
ANION GAP SERPL CALCULATED.3IONS-SCNC: 10 MMOL/L (ref 7–16)
AST SERPL-CCNC: 16 U/L (ref 0–31)
BASOPHILS # BLD: 0.02 E9/L (ref 0–0.2)
BASOPHILS NFR BLD: 0.4 % (ref 0–2)
BILIRUB SERPL-MCNC: 0.4 MG/DL (ref 0–1.2)
BUN SERPL-MCNC: 21 MG/DL (ref 6–23)
CALCIUM SERPL-MCNC: 8.8 MG/DL (ref 8.6–10.2)
CHLORIDE SERPL-SCNC: 100 MMOL/L (ref 98–107)
CO2 SERPL-SCNC: 30 MMOL/L (ref 22–29)
CREAT SERPL-MCNC: 0.9 MG/DL (ref 0.5–1)
EOSINOPHIL # BLD: 0.3 E9/L (ref 0.05–0.5)
EOSINOPHIL NFR BLD: 5.4 % (ref 0–6)
ERYTHROCYTE [DISTWIDTH] IN BLOOD BY AUTOMATED COUNT: 14.6 FL (ref 11.5–15)
GLUCOSE SERPL-MCNC: 72 MG/DL (ref 74–99)
HCT VFR BLD AUTO: 32.4 % (ref 34–48)
HGB BLD-MCNC: 9.4 G/DL (ref 11.5–15.5)
IMM GRANULOCYTES # BLD: 0.04 E9/L
IMM GRANULOCYTES NFR BLD: 0.7 % (ref 0–5)
LYMPHOCYTES # BLD: 0.63 E9/L (ref 1.5–4)
LYMPHOCYTES NFR BLD: 11.4 % (ref 20–42)
MAGNESIUM SERPL-MCNC: 2 MG/DL (ref 1.6–2.6)
MCH RBC QN AUTO: 26.4 PG (ref 26–35)
MCHC RBC AUTO-ENTMCNC: 29 % (ref 32–34.5)
MCV RBC AUTO: 91 FL (ref 80–99.9)
METER GLUCOSE: 108 MG/DL (ref 74–99)
METER GLUCOSE: 78 MG/DL (ref 74–99)
MONOCYTES # BLD: 0.7 E9/L (ref 0.1–0.95)
MONOCYTES NFR BLD: 12.6 % (ref 2–12)
NEUTROPHILS # BLD: 3.85 E9/L (ref 1.8–7.3)
NEUTS SEG NFR BLD: 69.5 % (ref 43–80)
PLATELET # BLD AUTO: 145 E9/L (ref 130–450)
PMV BLD AUTO: 11.3 FL (ref 7–12)
POTASSIUM SERPL-SCNC: 3.5 MMOL/L (ref 3.5–5)
PROT SERPL-MCNC: 6.5 G/DL (ref 6.4–8.3)
RBC # BLD AUTO: 3.56 E12/L (ref 3.5–5.5)
SODIUM SERPL-SCNC: 140 MMOL/L (ref 132–146)
VANCOMYCIN SERPL-MCNC: 24.3 MCG/ML (ref 5–40)
WBC # BLD: 5.5 E9/L (ref 4.5–11.5)

## 2023-07-09 PROCEDURE — 85025 COMPLETE CBC W/AUTO DIFF WBC: CPT

## 2023-07-09 PROCEDURE — 6360000002 HC RX W HCPCS: Performed by: INTERNAL MEDICINE

## 2023-07-09 PROCEDURE — 6370000000 HC RX 637 (ALT 250 FOR IP)

## 2023-07-09 PROCEDURE — 83735 ASSAY OF MAGNESIUM: CPT

## 2023-07-09 PROCEDURE — 94660 CPAP INITIATION&MGMT: CPT

## 2023-07-09 PROCEDURE — 94640 AIRWAY INHALATION TREATMENT: CPT

## 2023-07-09 PROCEDURE — 2580000003 HC RX 258

## 2023-07-09 PROCEDURE — 71045 X-RAY EXAM CHEST 1 VIEW: CPT

## 2023-07-09 PROCEDURE — 99232 SBSQ HOSP IP/OBS MODERATE 35: CPT | Performed by: FAMILY MEDICINE

## 2023-07-09 PROCEDURE — 1200000000 HC SEMI PRIVATE

## 2023-07-09 PROCEDURE — 80202 ASSAY OF VANCOMYCIN: CPT

## 2023-07-09 PROCEDURE — 2700000000 HC OXYGEN THERAPY PER DAY

## 2023-07-09 PROCEDURE — 80053 COMPREHEN METABOLIC PANEL: CPT

## 2023-07-09 PROCEDURE — 6370000000 HC RX 637 (ALT 250 FOR IP): Performed by: INTERNAL MEDICINE

## 2023-07-09 PROCEDURE — 36415 COLL VENOUS BLD VENIPUNCTURE: CPT

## 2023-07-09 PROCEDURE — 82962 GLUCOSE BLOOD TEST: CPT

## 2023-07-09 PROCEDURE — 99222 1ST HOSP IP/OBS MODERATE 55: CPT | Performed by: ORTHOPAEDIC SURGERY

## 2023-07-09 PROCEDURE — 6360000002 HC RX W HCPCS

## 2023-07-09 RX ORDER — OXYCODONE HYDROCHLORIDE 5 MG/1
5 TABLET ORAL EVERY 12 HOURS PRN
Status: DISCONTINUED | OUTPATIENT
Start: 2023-07-09 | End: 2023-07-19 | Stop reason: HOSPADM

## 2023-07-09 RX ORDER — DEXTROSE MONOHYDRATE 100 MG/ML
INJECTION, SOLUTION INTRAVENOUS CONTINUOUS PRN
Status: DISCONTINUED | OUTPATIENT
Start: 2023-07-09 | End: 2023-07-19 | Stop reason: HOSPADM

## 2023-07-09 RX ADMIN — DULOXETINE 60 MG: 60 CAPSULE, DELAYED RELEASE ORAL at 08:44

## 2023-07-09 RX ADMIN — CEFEPIME 2000 MG: 2 INJECTION, POWDER, FOR SOLUTION INTRAVENOUS at 00:32

## 2023-07-09 RX ADMIN — GUAIFENESIN 400 MG: 400 TABLET ORAL at 08:44

## 2023-07-09 RX ADMIN — DESMOPRESSIN ACETATE 200 MCG: 0.1 TABLET ORAL at 08:44

## 2023-07-09 RX ADMIN — LEVETIRACETAM 500 MG: 500 TABLET, FILM COATED ORAL at 08:44

## 2023-07-09 RX ADMIN — DIGOXIN 125 MCG: 250 INJECTION, SOLUTION INTRAMUSCULAR; INTRAVENOUS at 08:45

## 2023-07-09 RX ADMIN — FUROSEMIDE 40 MG: 40 TABLET ORAL at 08:43

## 2023-07-09 RX ADMIN — IPRATROPIUM BROMIDE AND ALBUTEROL SULFATE 1 DOSE: .5; 2.5 SOLUTION RESPIRATORY (INHALATION) at 19:02

## 2023-07-09 RX ADMIN — METOPROLOL SUCCINATE 25 MG: 25 TABLET, EXTENDED RELEASE ORAL at 20:38

## 2023-07-09 RX ADMIN — OXYCODONE HYDROCHLORIDE 5 MG: 5 TABLET ORAL at 20:42

## 2023-07-09 RX ADMIN — CEFEPIME 2000 MG: 2 INJECTION, POWDER, FOR SOLUTION INTRAVENOUS at 12:57

## 2023-07-09 RX ADMIN — BUDESONIDE 500 MCG: 0.5 SUSPENSION RESPIRATORY (INHALATION) at 08:01

## 2023-07-09 RX ADMIN — ARFORMOTEROL TARTRATE 15 MCG: 15 SOLUTION RESPIRATORY (INHALATION) at 19:02

## 2023-07-09 RX ADMIN — MUPIROCIN: 20 OINTMENT TOPICAL at 08:54

## 2023-07-09 RX ADMIN — OXYCODONE HYDROCHLORIDE 5 MG: 5 TABLET ORAL at 10:29

## 2023-07-09 RX ADMIN — MUPIROCIN: 20 OINTMENT TOPICAL at 20:38

## 2023-07-09 RX ADMIN — VANCOMYCIN HYDROCHLORIDE 1000 MG: 1 INJECTION, POWDER, LYOPHILIZED, FOR SOLUTION INTRAVENOUS at 20:49

## 2023-07-09 RX ADMIN — MIDODRINE HYDROCHLORIDE 2.5 MG: 5 TABLET ORAL at 10:26

## 2023-07-09 RX ADMIN — ARFORMOTEROL TARTRATE 15 MCG: 15 SOLUTION RESPIRATORY (INHALATION) at 08:00

## 2023-07-09 RX ADMIN — SODIUM CHLORIDE, PRESERVATIVE FREE 10 ML: 5 INJECTION INTRAVENOUS at 08:45

## 2023-07-09 RX ADMIN — METOPROLOL SUCCINATE 25 MG: 25 TABLET, EXTENDED RELEASE ORAL at 08:44

## 2023-07-09 RX ADMIN — LEVETIRACETAM 500 MG: 500 TABLET, FILM COATED ORAL at 20:38

## 2023-07-09 RX ADMIN — BUDESONIDE 500 MCG: 0.5 SUSPENSION RESPIRATORY (INHALATION) at 19:02

## 2023-07-09 RX ADMIN — APIXABAN 5 MG: 5 TABLET, FILM COATED ORAL at 20:38

## 2023-07-09 RX ADMIN — DESMOPRESSIN ACETATE 200 MCG: 0.1 TABLET ORAL at 20:38

## 2023-07-09 RX ADMIN — PREDNISONE 5 MG: 5 TABLET ORAL at 08:44

## 2023-07-09 RX ADMIN — MIDODRINE HYDROCHLORIDE 2.5 MG: 5 TABLET ORAL at 12:51

## 2023-07-09 RX ADMIN — MIDODRINE HYDROCHLORIDE 2.5 MG: 5 TABLET ORAL at 18:45

## 2023-07-09 RX ADMIN — APIXABAN 5 MG: 5 TABLET, FILM COATED ORAL at 08:44

## 2023-07-09 RX ADMIN — GUAIFENESIN 400 MG: 400 TABLET ORAL at 16:10

## 2023-07-09 RX ADMIN — SODIUM CHLORIDE, PRESERVATIVE FREE 10 ML: 5 INJECTION INTRAVENOUS at 20:38

## 2023-07-09 RX ADMIN — AMIODARONE HYDROCHLORIDE 200 MG: 200 TABLET ORAL at 08:44

## 2023-07-09 RX ADMIN — IPRATROPIUM BROMIDE AND ALBUTEROL SULFATE 1 DOSE: .5; 2.5 SOLUTION RESPIRATORY (INHALATION) at 15:16

## 2023-07-09 RX ADMIN — IPRATROPIUM BROMIDE AND ALBUTEROL SULFATE 1 DOSE: .5; 2.5 SOLUTION RESPIRATORY (INHALATION) at 07:59

## 2023-07-09 RX ADMIN — IPRATROPIUM BROMIDE AND ALBUTEROL SULFATE 1 DOSE: .5; 2.5 SOLUTION RESPIRATORY (INHALATION) at 12:08

## 2023-07-09 RX ADMIN — LEVOTHYROXINE SODIUM 75 MCG: 0.07 TABLET ORAL at 08:44

## 2023-07-09 ASSESSMENT — PAIN DESCRIPTION - FREQUENCY: FREQUENCY: CONTINUOUS

## 2023-07-09 ASSESSMENT — PAIN DESCRIPTION - ONSET: ONSET: ON-GOING

## 2023-07-09 ASSESSMENT — PAIN DESCRIPTION - DESCRIPTORS
DESCRIPTORS: SHARP;DISCOMFORT
DESCRIPTORS: ACHING;SHARP;THROBBING

## 2023-07-09 ASSESSMENT — PAIN - FUNCTIONAL ASSESSMENT: PAIN_FUNCTIONAL_ASSESSMENT: ACTIVITIES ARE NOT PREVENTED

## 2023-07-09 ASSESSMENT — PAIN SCALES - WONG BAKER
WONGBAKER_NUMERICALRESPONSE: 0
WONGBAKER_NUMERICALRESPONSE: 0

## 2023-07-09 ASSESSMENT — PAIN SCALES - GENERAL
PAINLEVEL_OUTOF10: 9
PAINLEVEL_OUTOF10: 8

## 2023-07-09 ASSESSMENT — PAIN DESCRIPTION - ORIENTATION
ORIENTATION: RIGHT;LEFT
ORIENTATION: RIGHT;MID;ANTERIOR

## 2023-07-09 ASSESSMENT — PAIN DESCRIPTION - PAIN TYPE: TYPE: ACUTE PAIN

## 2023-07-09 ASSESSMENT — PAIN DESCRIPTION - LOCATION
LOCATION: SHOULDER;LEG
LOCATION: CHEST;SHOULDER

## 2023-07-09 NOTE — PROGRESS NOTES
Date: 7/8/2023    Time: 9:28 PM    Patient Placed On BIPAP/CPAP/ Non-Invasive Ventilation? Yes    If no must comment. Facial area red/color change? No           If YES are Blister/Lesion present? No   If yes must notify nursing staff  BIPAP/CPAP skin barrier?   Yes    Skin barrier type:mepilexlite       Comments:        Jermaine Teixeira RCP

## 2023-07-09 NOTE — PROGRESS NOTES
Pharmacy Consultation Note  (Antibiotic Dosing and Monitoring)    Initial consult date: 7/5/23  Consulting physician/provider: Dr. Rivers Course  Drug: Vancomycin  Indication: HAP; 7 days    Age/  Gender Height Weight IBW  Allergy Information   67 y.o./female 5' 5\" (165.1 cm) 165 lb (74.8 kg)     Ideal body weight: 57 kg (125 lb 10.6 oz)  Adjusted ideal body weight: 62.3 kg (137 lb 7 oz)   Patient has no known allergies. Renal Function:  Recent Labs     07/07/23  1459 07/08/23  1658 07/09/23  0741   BUN 28* 25* 21   CREATININE 1.3* 1.2* 0.9         Intake/Output Summary (Last 24 hours) at 7/9/2023 0924  Last data filed at 7/8/2023 1700  Gross per 24 hour   Intake --   Output 700 ml   Net -700 ml         Vancomycin Monitoring:  Trough:  No results for input(s): VANCOTROUGH in the last 72 hours. Random:    Recent Labs     07/07/23  0446 07/09/23  0741   VANCORANDOM 30.0 24.3         Recent Labs     07/06/23  1104   BLOODCULT2 24 Hours no growth          Historical Cultures:  Organism   Date Value Ref Range Status   07/05/2023 MRSA nasal colonization (A)  Final     Recent Labs     07/06/23  1112   BC 24 Hours no growth         Vancomycin Administration Times:  Recent vancomycin administrations                     vancomycin (VANCOCIN) 1,000 mg in sodium chloride 0.9 % 250 mL IVPB ([id number]) (mg) 1,000 mg New Bag 07/08/23 2109     1,000 mg New Bag 07/07/23 2121    vancomycin 1500 mg in sodium chloride 0.9% 300 mL IVPB (mg) 1,500 mg New Bag 07/06/23 1744                    Assessment:  Patient is a 78 y.o. female who has been initiated on vancomycin  Estimated Creatinine Clearance: 61 mL/min (based on SCr of 0.9 mg/dL).   To dose vancomycin, pharmacy will be utilizing [employer] calculation software for goal AUC/-600 mg/L-hr (predicted AUC/ALE = 449, Tr =12.1 mcg/mL)  7/6: Scr remains 1.1, UOP 0.6 mL/kg/hr  7/7: Scr increased to 1.4, UOP 0.7 mL/kg/hr, random level is 30 mcg/mL (~11 hours post-dose)  7/8: SCr

## 2023-07-09 NOTE — PROGRESS NOTES
Head: Normocephalic and atraumatic. Eyes:      Conjunctiva/sclera: Conjunctivae normal.   Neck:      Trachea: No tracheal deviation. Cardiovascular:      Rate and Rhythm: Normal rate and regular rhythm. Heart sounds: Normal heart sounds. Pulmonary:      Breath sounds: Decreased air movement present. Decreased breath sounds present. Abdominal:      General: Bowel sounds are normal.      Palpations: Abdomen is soft. Tenderness: There is no abdominal tenderness. Musculoskeletal:         General: Swelling present. Cervical back: Neck supple. Lymphadenopathy:      Cervical: No cervical adenopathy. Skin:     General: Skin is warm and dry. Findings: No rash. Neurological:      General: No focal deficit present. Mental Status: She is alert and easily aroused. Psychiatric:         Behavior: Behavior normal.       Pertinent/ New Labs and Imaging Studies     Pulmonary Function Testing personally reviewed and interpreted. PERTINENT LAB RESULTS: Labs reviewed. DIAGNOSTICS: Pertinent imaging reviewed. Assessment:      Chronic respiratory failure with hypoxia and hypercapnia  Multilobar pneumonia  Severe pulmonary hypertension pre and postcapillary with borderline preserved cardiac index. Negative nitric oxide response. New right IJ thrombus  Pulmonary sarcoidosis.   States more diagnosed by lung biopsy 2007 patient on chronic daily prednisone 5 mg  Diabetes insipidus  Prior abdominal wall hematoma  Stage IIIa chronic kidney disease  Moderate Gold stage II COPD  Anemia  History of GI bleed  Moderate protein calorie malnutrition  Chronic home O2 dependence-4 L  Heart failure with preserved EF  PAF  History of PE  Multiple hospital admissions   Chronic tibia fractures      Plan:     Continue supplemental oxygen to maintain SPO2 between 88-92%  Continue NIV -AVAPS QHS and cycle during day  Diuresis as tolerated, nephro follow  Continue chronic prednisone 5 mg  Continue

## 2023-07-09 NOTE — PROGRESS NOTES
insufficiency, permanent AF on apixaban, HFpEF (6Gnohemy Ryan MD0% July 2022), COPD, colitis, hypothyroidism, recently admitted with new onset seizures, NSTEMI and IRMA, who recently underwent a right heart catheterization at [facility] on June 22, 2023 and was found to have combined pre and postcapillary pulmonary hypertension with a negative response to nitric oxide administration, she has well was found to have a right IJ thrombosis but she was deemed to be high risk for anticoagulation, who presented to the ED on 7/5/2023 with a chief complaint of shortness of breath. Chest x-ray showed mild bibasilar atelectasis. proBNP was 4314. Her creatinine level on admission was 1.1 mg/dL with a BUN of 24. Her home medications included Lasix 20 mg p.o. daily, DDAVP 200 mcg twice a day. Problems resolved: IRMA stage I, possibly hemodynamically mediated due to persisting hypotension, doubt overdiuresis, rule out urinary retention. 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   Secondary adrenal insufficiency, 2/2 sarcoidosis induced hypopituitarism, on prednisone 5 mg p.o. daily  HTN, on metoprolol, on Lasix 40 mg daily  HFpEF 60%, proBNP 4314 > 1325 , on metoprolol  ---------------------------------------------------------------  Pulmonary HTN  Multilobar pneumonia, on cefepime and vancomycin  PAF, on digoxin, metoprolol, amiodarone, on apixaban  Hypothyroidism, probably secondary, on levothyroxine  New onset seizures, on levetiracetam  Anemia, normocytic      Plan:     Continue  midodrine 2.5 mg p.o. 3 times daily  Continue DDAVP 200 mcg p.o. twice daily  Continue Lasix 40 mg daily  Continue prednisone 5 mg PO daily   Continue metoprolol succinate 100 mg daily  Monitor sodium level  Monitor renal function       Electronically signed by FRIDA Tamayo NP on 7/9/2023 at 11:04 AM

## 2023-07-09 NOTE — PLAN OF CARE
Problem: Chronic Conditions and Co-morbidities  Goal: Patient's chronic conditions and co-morbidity symptoms are monitored and maintained or improved  Outcome: Progressing     Problem: Discharge Planning  Goal: Discharge to home or other facility with appropriate resources  Outcome: Progressing     Problem: Pain  Goal: Verbalizes/displays adequate comfort level or baseline comfort level  Outcome: Progressing     Problem: Safety - Adult  Goal: Free from fall injury  Outcome: Progressing     Problem: Skin/Tissue Integrity  Goal: Absence of new skin breakdown  Description: 1. Monitor for areas of redness and/or skin breakdown  2. Assess vascular access sites hourly  3. Every 4-6 hours minimum:  Change oxygen saturation probe site  4. Every 4-6 hours:  If on nasal continuous positive airway pressure, respiratory therapy assess nares and determine need for appliance change or resting period.   Outcome: Progressing     Problem: ABCDS Injury Assessment  Goal: Absence of physical injury  Outcome: Progressing     Problem: Neurosensory - Adult  Goal: Achieves stable or improved neurological status  Outcome: Progressing     Problem: Neurosensory - Adult  Goal: Absence of seizures  Outcome: Progressing     Problem: Neurosensory - Adult  Goal: Achieves maximal functionality and self care  Outcome: Progressing     Problem: Cardiovascular - Adult  Goal: Maintains optimal cardiac output and hemodynamic stability  Outcome: Progressing     Problem: Cardiovascular - Adult  Goal: Absence of cardiac dysrhythmias or at baseline  Outcome: Progressing     Problem: Skin/Tissue Integrity - Adult  Goal: Skin integrity remains intact  Outcome: Progressing

## 2023-07-09 NOTE — PROGRESS NOTES
amiodarone. Cardiology consulted , appreciate recs. Started on digoxin 125mcg IV daily. On DDAVP for DI     Normocytic anemia-monitor hemoglobin    On keppra for seizure prophylaxis from the last admission    Xray of tibia showed fracture - ortho consulted. No plan for intervention. Opiates with caution for pain      Attending Physician Statement  I have reviewed the chart and seen the patient with the resident(s). I personally reviewed images, EKG's and similar tests, if present. I personally reviewed and performed key elements of the history and exam.  I have reviewed and confirmed student and/or resident history and exam with changes as indicated above. I agree with the assessment, plan and orders as documented by the resident. Please refer to the resident progress note today for additional information. Sherley Molina.  Rory Hopson MD

## 2023-07-10 ENCOUNTER — APPOINTMENT (OUTPATIENT)
Dept: GENERAL RADIOLOGY | Age: 68
End: 2023-07-10
Payer: MEDICARE

## 2023-07-10 LAB
ALBUMIN SERPL-MCNC: 3.4 G/DL (ref 3.5–5.2)
ALP SERPL-CCNC: 77 U/L (ref 35–104)
ALT SERPL-CCNC: 11 U/L (ref 0–32)
ANION GAP SERPL CALCULATED.3IONS-SCNC: 10 MMOL/L (ref 7–16)
ANISOCYTOSIS: ABNORMAL
AST SERPL-CCNC: 16 U/L (ref 0–31)
BASOPHILS # BLD: 0 E9/L (ref 0–0.2)
BASOPHILS NFR BLD: 0.2 % (ref 0–2)
BILIRUB SERPL-MCNC: 0.3 MG/DL (ref 0–1.2)
BUN SERPL-MCNC: 19 MG/DL (ref 6–23)
CALCIUM SERPL-MCNC: 8.8 MG/DL (ref 8.6–10.2)
CHLORIDE SERPL-SCNC: 100 MMOL/L (ref 98–107)
CO2 SERPL-SCNC: 31 MMOL/L (ref 22–29)
CREAT SERPL-MCNC: 1.1 MG/DL (ref 0.5–1)
EOSINOPHIL # BLD: 0.21 E9/L (ref 0.05–0.5)
EOSINOPHIL NFR BLD: 3.5 % (ref 0–6)
ERYTHROCYTE [DISTWIDTH] IN BLOOD BY AUTOMATED COUNT: 14.5 FL (ref 11.5–15)
GLUCOSE SERPL-MCNC: 72 MG/DL (ref 74–99)
HCT VFR BLD AUTO: 32.2 % (ref 34–48)
HGB BLD-MCNC: 9.2 G/DL (ref 11.5–15.5)
HYPOCHROMIA: ABNORMAL
LYMPHOCYTES # BLD: 0.65 E9/L (ref 1.5–4)
LYMPHOCYTES NFR BLD: 11.3 % (ref 20–42)
MCH RBC QN AUTO: 26.4 PG (ref 26–35)
MCHC RBC AUTO-ENTMCNC: 28.6 % (ref 32–34.5)
MCV RBC AUTO: 92.3 FL (ref 80–99.9)
MONOCYTES # BLD: 0.77 E9/L (ref 0.1–0.95)
MONOCYTES NFR BLD: 13 % (ref 2–12)
NEUTROPHILS # BLD: 4.25 E9/L (ref 1.8–7.3)
NEUTS SEG NFR BLD: 72.2 % (ref 43–80)
OVALOCYTES: ABNORMAL
PLATELET # BLD AUTO: 165 E9/L (ref 130–450)
PMV BLD AUTO: 11.5 FL (ref 7–12)
POIKILOCYTES: ABNORMAL
POLYCHROMASIA: ABNORMAL
POTASSIUM SERPL-SCNC: 3.6 MMOL/L (ref 3.5–5)
PROT SERPL-MCNC: 6.2 G/DL (ref 6.4–8.3)
RBC # BLD AUTO: 3.49 E12/L (ref 3.5–5.5)
SODIUM SERPL-SCNC: 141 MMOL/L (ref 132–146)
TARGET CELLS: ABNORMAL
WBC # BLD: 5.9 E9/L (ref 4.5–11.5)

## 2023-07-10 PROCEDURE — 97535 SELF CARE MNGMENT TRAINING: CPT

## 2023-07-10 PROCEDURE — 99232 SBSQ HOSP IP/OBS MODERATE 35: CPT | Performed by: FAMILY MEDICINE

## 2023-07-10 PROCEDURE — 36415 COLL VENOUS BLD VENIPUNCTURE: CPT

## 2023-07-10 PROCEDURE — 1200000000 HC SEMI PRIVATE

## 2023-07-10 PROCEDURE — 97530 THERAPEUTIC ACTIVITIES: CPT

## 2023-07-10 PROCEDURE — 94660 CPAP INITIATION&MGMT: CPT

## 2023-07-10 PROCEDURE — 6370000000 HC RX 637 (ALT 250 FOR IP)

## 2023-07-10 PROCEDURE — 6370000000 HC RX 637 (ALT 250 FOR IP): Performed by: INTERNAL MEDICINE

## 2023-07-10 PROCEDURE — 6360000002 HC RX W HCPCS: Performed by: INTERNAL MEDICINE

## 2023-07-10 PROCEDURE — 85025 COMPLETE CBC W/AUTO DIFF WBC: CPT

## 2023-07-10 PROCEDURE — 94640 AIRWAY INHALATION TREATMENT: CPT

## 2023-07-10 PROCEDURE — 6360000002 HC RX W HCPCS

## 2023-07-10 PROCEDURE — 2580000003 HC RX 258

## 2023-07-10 PROCEDURE — 71045 X-RAY EXAM CHEST 1 VIEW: CPT

## 2023-07-10 PROCEDURE — 80053 COMPREHEN METABOLIC PANEL: CPT

## 2023-07-10 PROCEDURE — 2700000000 HC OXYGEN THERAPY PER DAY

## 2023-07-10 RX ADMIN — CEFEPIME 2000 MG: 2 INJECTION, POWDER, FOR SOLUTION INTRAVENOUS at 12:17

## 2023-07-10 RX ADMIN — SODIUM CHLORIDE, PRESERVATIVE FREE 10 ML: 5 INJECTION INTRAVENOUS at 07:55

## 2023-07-10 RX ADMIN — ARFORMOTEROL TARTRATE 15 MCG: 15 SOLUTION RESPIRATORY (INHALATION) at 19:22

## 2023-07-10 RX ADMIN — VANCOMYCIN HYDROCHLORIDE 1000 MG: 1 INJECTION, POWDER, LYOPHILIZED, FOR SOLUTION INTRAVENOUS at 21:29

## 2023-07-10 RX ADMIN — MIDODRINE HYDROCHLORIDE 2.5 MG: 5 TABLET ORAL at 16:39

## 2023-07-10 RX ADMIN — DESMOPRESSIN ACETATE 200 MCG: 0.1 TABLET ORAL at 07:54

## 2023-07-10 RX ADMIN — METOPROLOL SUCCINATE 25 MG: 25 TABLET, EXTENDED RELEASE ORAL at 07:54

## 2023-07-10 RX ADMIN — MIDODRINE HYDROCHLORIDE 2.5 MG: 5 TABLET ORAL at 12:17

## 2023-07-10 RX ADMIN — METOPROLOL SUCCINATE 25 MG: 25 TABLET, EXTENDED RELEASE ORAL at 21:23

## 2023-07-10 RX ADMIN — OXYCODONE HYDROCHLORIDE 5 MG: 5 TABLET ORAL at 12:26

## 2023-07-10 RX ADMIN — MIDODRINE HYDROCHLORIDE 2.5 MG: 5 TABLET ORAL at 07:54

## 2023-07-10 RX ADMIN — AMIODARONE HYDROCHLORIDE 200 MG: 200 TABLET ORAL at 07:54

## 2023-07-10 RX ADMIN — BUDESONIDE 500 MCG: 0.5 SUSPENSION RESPIRATORY (INHALATION) at 08:13

## 2023-07-10 RX ADMIN — APIXABAN 5 MG: 5 TABLET, FILM COATED ORAL at 07:54

## 2023-07-10 RX ADMIN — GUAIFENESIN 400 MG: 400 TABLET ORAL at 04:34

## 2023-07-10 RX ADMIN — MUPIROCIN: 20 OINTMENT TOPICAL at 07:53

## 2023-07-10 RX ADMIN — LEVETIRACETAM 500 MG: 500 TABLET, FILM COATED ORAL at 07:53

## 2023-07-10 RX ADMIN — CEFEPIME 2000 MG: 2 INJECTION, POWDER, FOR SOLUTION INTRAVENOUS at 00:24

## 2023-07-10 RX ADMIN — DIGOXIN 125 MCG: 250 INJECTION, SOLUTION INTRAMUSCULAR; INTRAVENOUS at 07:55

## 2023-07-10 RX ADMIN — LEVETIRACETAM 500 MG: 500 TABLET, FILM COATED ORAL at 21:23

## 2023-07-10 RX ADMIN — IPRATROPIUM BROMIDE AND ALBUTEROL SULFATE 1 DOSE: .5; 2.5 SOLUTION RESPIRATORY (INHALATION) at 08:11

## 2023-07-10 RX ADMIN — IPRATROPIUM BROMIDE AND ALBUTEROL SULFATE 1 DOSE: .5; 2.5 SOLUTION RESPIRATORY (INHALATION) at 19:22

## 2023-07-10 RX ADMIN — ARFORMOTEROL TARTRATE 15 MCG: 15 SOLUTION RESPIRATORY (INHALATION) at 08:12

## 2023-07-10 RX ADMIN — BUDESONIDE 500 MCG: 0.5 SUSPENSION RESPIRATORY (INHALATION) at 19:22

## 2023-07-10 RX ADMIN — LEVOTHYROXINE SODIUM 75 MCG: 0.07 TABLET ORAL at 07:54

## 2023-07-10 RX ADMIN — IPRATROPIUM BROMIDE AND ALBUTEROL SULFATE 1 DOSE: .5; 2.5 SOLUTION RESPIRATORY (INHALATION) at 15:27

## 2023-07-10 RX ADMIN — DESMOPRESSIN ACETATE 200 MCG: 0.1 TABLET ORAL at 21:23

## 2023-07-10 RX ADMIN — PREDNISONE 5 MG: 5 TABLET ORAL at 07:55

## 2023-07-10 RX ADMIN — FUROSEMIDE 40 MG: 40 TABLET ORAL at 07:54

## 2023-07-10 RX ADMIN — APIXABAN 5 MG: 5 TABLET, FILM COATED ORAL at 21:23

## 2023-07-10 RX ADMIN — ONDANSETRON 4 MG: 2 INJECTION INTRAMUSCULAR; INTRAVENOUS at 04:34

## 2023-07-10 RX ADMIN — DULOXETINE 60 MG: 60 CAPSULE, DELAYED RELEASE ORAL at 07:54

## 2023-07-10 ASSESSMENT — PAIN SCALES - GENERAL
PAINLEVEL_OUTOF10: 0
PAINLEVEL_OUTOF10: 9

## 2023-07-10 ASSESSMENT — PAIN SCALES - WONG BAKER: WONGBAKER_NUMERICALRESPONSE: 0

## 2023-07-10 ASSESSMENT — EJECTION FRACTION
EF_SOURCE: 2D ECHO
EF_VALUE: 67.5

## 2023-07-10 NOTE — CARE COORDINATION
Plan at this time is home with  and [facility] when medically ready. Home health care orders are in. Ortho surgery, nephrology, cardiology and pulmonology are all following. Per [facility] from [facility], patient has a Bipap respiratory assist advice and 02 6 ltr with them. She is currently on O2 6 liters with O2 sat 94%. PT/OT [facility] scorers are 10/24 & 14/24. I met with patient in room to discuss therapy evals and transition of care. Patient is adamant about not going to [facility]. She remains agreeable to home health care. Therapy department notified of need for daily therapy TX's since plan is home. Patient's  will transport her home at time of discharge and will bring a portable O2 tank for the ride home.   Linsday Rock RN CM  [phone]

## 2023-07-10 NOTE — PROGRESS NOTES
07/09/23 2129   NIV Type   NIV Started/Stopped On   Equipment Type v60   Mode AVAPS   Mask Type Full face mask   Mask Size Small   Settings/Measurements   PIP Observed 18 cm H20   CPAP/EPAP 8 cmH2O   IPAP Min 17 cmH2O   IPAP Max 30 cmH2O   Vt (Set, mL) 500 mL   Vt (Measured) 450 mL   FiO2  50 %   I Time/ I Time % 0.9 s   Minute Volume (L/min) 8.2 Liters   Mask Leak (lpm) 18 lpm   Patient's Home Machine No

## 2023-07-10 NOTE — PROGRESS NOTES
[facility] Inpatient   Resident Progress Note    S:  Hospital day: 5   Brief Synopsis: Leobardo Alejandre is a 78 y.o. female with a PMH of combined congestive heart failure, CKD, pulmonary sarcoidosis, pulmonary hypertension,ventral hernia, diabetes insipidus, hypothyroidism, HTN, and chronic normocytic anemia who presented on 7/5/23 for severe shortness of breath and cough productive of green sputum. Patient was placed on AVAPS but was decompensating, having increased work of breathing and tachypnea. Patient also developed afib RVR (had been in sinus rhythm upon presentation). Workup in ED showed multifocal pneumonia. Patient was admitted to ICU with acute hypoxic respiratory failure 2/2 pneumonia.      Overnight/interim:   Patient asked to remove BiPAP this AM, saturating appropriately on 6L NC  Stated her shortness of breath had improved significantly compared to admission  She denied chest pain, cough, abdominal pain, nausea, vomiting      Cont meds:    dextrose      sodium chloride       Scheduled meds:    mupirocin   Each Nostril BID    vancomycin  1,000 mg IntraVENous Q24H    midodrine  2.5 mg Oral TID WC    furosemide  40 mg Oral Daily    budesonide  500 mcg Nebulization BID    arformoterol tartrate  15 mcg Nebulization BID    sodium chloride flush  5-40 mL IntraVENous 2 times per day    ipratropium 0.5 mg-albuterol 2.5 mg  1 Dose Inhalation Q4H WA    cefepime  2,000 mg IntraVENous Q12H    desmopressin  200 mcg Oral BID    DULoxetine  60 mg Oral Daily    apixaban  5 mg Oral BID    [Held by provider] hydrALAZINE  25 mg Oral 3 times per day    levETIRAcetam  500 mg Oral BID    levothyroxine  75 mcg Oral Daily    amiodarone  200 mg Oral Daily    metoprolol succinate  25 mg Oral BID    predniSONE  5 mg Oral Daily    digoxin  125 mcg IntraVENous Daily     PRN meds: glucose, dextrose bolus **OR** dextrose bolus, glucagon (rDNA), dextrose, oxyCODONE, sodium chloride flush, sodium chloride, ondansetron

## 2023-07-10 NOTE — PROGRESS NOTES
Pharmacy Consultation Note  (Antibiotic Dosing and Monitoring)    Initial consult date: 7/5/23  Consulting physician/provider: Dr. Gomez Adjutant  Drug: Vancomycin  Indication: HAP; 7 days    Age/  Gender Height Weight IBW  Allergy Information   67 y.o./female 5' 5\" (165.1 cm) 165 lb (74.8 kg)     Ideal body weight: 57 kg (125 lb 10.6 oz)  Adjusted ideal body weight: 62.3 kg (137 lb 7 oz)   Patient has no known allergies. Renal Function:  Recent Labs     07/08/23  1658 07/09/23  0741 07/10/23  0428   BUN 25* 21 19   CREATININE 1.2* 0.9 1.1*         Intake/Output Summary (Last 24 hours) at 7/10/2023 1110  Last data filed at 7/9/2023 1621  Gross per 24 hour   Intake 240 ml   Output 800 ml   Net -560 ml         Vancomycin Monitoring:  Trough:  No results for input(s): VANCOTROUGH in the last 72 hours. Random:    Recent Labs     07/09/23  0741   VANCORANDOM 24.3         No results for input(s): Jamila Gordon in the last 72 hours. Historical Cultures:  Organism   Date Value Ref Range Status   07/05/2023 MRSA nasal colonization (A)  Final     No results for input(s): BC in the last 72 hours. Vancomycin Administration Times:  Recent vancomycin administrations                     vancomycin (VANCOCIN) 1,000 mg in sodium chloride 0.9 % 250 mL IVPB ([id number]) (mg) 1,000 mg New Bag 07/09/23 2049     1,000 mg New Bag 07/08/23 2109     1,000 mg New Bag 07/07/23 2121          Assessment:  Patient is a 78 y.o. female who has been initiated on vancomycin  Estimated Creatinine Clearance: 50 mL/min (A) (based on SCr of 1.1 mg/dL (H)). To dose vancomycin, pharmacy will be utilizing [employer] calculation software for goal AUC/-600 mg/L-hr (predicted AUC/ALE = 518, Tr =15 mcg/mL)  Vancomycin day 6, afebrile, WBC wnl. 7 days empiric therapy planned ending with 7/11 evening dose. Plan:   Will continue vancomycin 1000 mg IV q 24 hr   Random level will be collected as needed to appropriately monitor   Will continue to monitor

## 2023-07-10 NOTE — PROGRESS NOTES
A  Standing: NT Sitting:     Static:  SBA    Dynamic:Min A  Standing: Min A Sitting:     Static: Ind    Dynamic:SBA  Standing: SBA pending WB precautions                   Endurance/Activity Tolerance   fair tolerance with light activity. Fair+ tolerance with light activity. WFL  For full ADL/light IADLs   Visual/  Perceptual Needs glasses                       Vitals: WNL during session; HR  with light activity    Treatment: OT treatment provided this date includes:     Instruction/training on safety and adapted techniques for completion of ADLs: to increase independence and safety in self-care. Instruction/training on safe bed mobility: with focus on safety, body mechanics, and precautions  Skilled Monitoring of Vitals: to include BP, spO2, and HR throughout session to maximize safety. Sitting/Standing Balance/Tolerance: Pt sat at EOB for ~10 min for unsupported ADL to increase balance and activity tolerance during ADLs and facilitate proper posture and positioning. Therapeutic activity: to challenge dynamic sitting/standing balance and endurance to promote safety during ADL tasks and functional transfers and mobility. Delirium Prevention: Environmental and sensory modifications assessed and implemented to decrease ICU acquired delirium and to improve overall orientation, mentation and pt interaction with family/staff. Line management and environmental modifications made prior to and end of session to ensure patient safety and to increase efficiency of session. Skilled monitoring of HR, O2 saturation, blood pressure and patient's response to activity performed throughout session. Comments: OK from RN to see patient. Upon arrival, patient semi supine in bed. Pt initially agitated and declining to participate this date; agreeable with encouragement to participate in therapy session. Pt demo fair+ tolerance with good understanding of education/techniques.  Pt educated on

## 2023-07-11 LAB
ALBUMIN SERPL-MCNC: 3.6 G/DL (ref 3.5–5.2)
ALP SERPL-CCNC: 86 U/L (ref 35–104)
ALT SERPL-CCNC: 12 U/L (ref 0–32)
ANION GAP SERPL CALCULATED.3IONS-SCNC: 10 MMOL/L (ref 7–16)
ANION GAP SERPL CALCULATED.3IONS-SCNC: 13 MMOL/L (ref 7–16)
AST SERPL-CCNC: 18 U/L (ref 0–31)
BACTERIA BLD CULT ORG #2: NORMAL
BACTERIA BLD CULT: NORMAL
BASOPHILS # BLD: 0.03 E9/L (ref 0–0.2)
BASOPHILS NFR BLD: 0.5 % (ref 0–2)
BILIRUB SERPL-MCNC: 0.3 MG/DL (ref 0–1.2)
BNP BLD-MCNC: 1206 PG/ML (ref 0–125)
BUN SERPL-MCNC: 24 MG/DL (ref 6–23)
BUN SERPL-MCNC: 27 MG/DL (ref 6–23)
CALCIUM SERPL-MCNC: 9.3 MG/DL (ref 8.6–10.2)
CALCIUM SERPL-MCNC: 9.7 MG/DL (ref 8.6–10.2)
CHLORIDE SERPL-SCNC: 98 MMOL/L (ref 98–107)
CHLORIDE SERPL-SCNC: 98 MMOL/L (ref 98–107)
CHLORIDE UR-SCNC: 77 MMOL/L
CO2 SERPL-SCNC: 31 MMOL/L (ref 22–29)
CO2 SERPL-SCNC: 32 MMOL/L (ref 22–29)
CREAT SERPL-MCNC: 1.7 MG/DL (ref 0.5–1)
CREAT SERPL-MCNC: 1.9 MG/DL (ref 0.5–1)
CREAT UR-MCNC: 68 MG/DL (ref 29–226)
EOSINOPHIL # BLD: 0.32 E9/L (ref 0.05–0.5)
EOSINOPHIL NFR BLD: 5.3 % (ref 0–6)
ERYTHROCYTE [DISTWIDTH] IN BLOOD BY AUTOMATED COUNT: 14.2 FL (ref 11.5–15)
GLUCOSE SERPL-MCNC: 83 MG/DL (ref 74–99)
GLUCOSE SERPL-MCNC: 97 MG/DL (ref 74–99)
HCT VFR BLD AUTO: 33.4 % (ref 34–48)
HGB BLD-MCNC: 9.6 G/DL (ref 11.5–15.5)
IMM GRANULOCYTES # BLD: 0.05 E9/L
IMM GRANULOCYTES NFR BLD: 0.8 % (ref 0–5)
LYMPHOCYTES # BLD: 0.69 E9/L (ref 1.5–4)
LYMPHOCYTES NFR BLD: 11.5 % (ref 20–42)
MCH RBC QN AUTO: 26.4 PG (ref 26–35)
MCHC RBC AUTO-ENTMCNC: 28.7 % (ref 32–34.5)
MCV RBC AUTO: 92 FL (ref 80–99.9)
MONOCYTES # BLD: 0.82 E9/L (ref 0.1–0.95)
MONOCYTES NFR BLD: 13.7 % (ref 2–12)
NEUTROPHILS # BLD: 4.09 E9/L (ref 1.8–7.3)
NEUTS SEG NFR BLD: 68.2 % (ref 43–80)
OSMOLALITY SERPL CALC.SUM OF ELEC: 294 MOSM/KG (ref 285–310)
OSMOLALITY URINE: 314 MOSM/KG (ref 300–900)
OVALOCYTES: ABNORMAL
PLATELET # BLD AUTO: 195 E9/L (ref 130–450)
PMV BLD AUTO: 11.7 FL (ref 7–12)
POIKILOCYTES: ABNORMAL
POLYCHROMASIA: ABNORMAL
POTASSIUM SERPL-SCNC: 3.7 MMOL/L (ref 3.5–5)
POTASSIUM SERPL-SCNC: 3.7 MMOL/L (ref 3.5–5)
POTASSIUM UR-SCNC: 32.6 MMOL/L
PROT SERPL-MCNC: 6.8 G/DL (ref 6.4–8.3)
RBC # BLD AUTO: 3.63 E12/L (ref 3.5–5.5)
SODIUM SERPL-SCNC: 140 MMOL/L (ref 132–146)
SODIUM SERPL-SCNC: 142 MMOL/L (ref 132–146)
SODIUM UR-SCNC: 71 MMOL/L
UREA NITROGEN, UR: 250 MG/DL (ref 800–1666)
WBC # BLD: 6 E9/L (ref 4.5–11.5)

## 2023-07-11 PROCEDURE — 94640 AIRWAY INHALATION TREATMENT: CPT

## 2023-07-11 PROCEDURE — 94660 CPAP INITIATION&MGMT: CPT

## 2023-07-11 PROCEDURE — 83935 ASSAY OF URINE OSMOLALITY: CPT

## 2023-07-11 PROCEDURE — 36415 COLL VENOUS BLD VENIPUNCTURE: CPT

## 2023-07-11 PROCEDURE — 82436 ASSAY OF URINE CHLORIDE: CPT

## 2023-07-11 PROCEDURE — 6360000002 HC RX W HCPCS

## 2023-07-11 PROCEDURE — 6360000002 HC RX W HCPCS: Performed by: INTERNAL MEDICINE

## 2023-07-11 PROCEDURE — 97530 THERAPEUTIC ACTIVITIES: CPT

## 2023-07-11 PROCEDURE — 97535 SELF CARE MNGMENT TRAINING: CPT

## 2023-07-11 PROCEDURE — 1200000000 HC SEMI PRIVATE

## 2023-07-11 PROCEDURE — 6370000000 HC RX 637 (ALT 250 FOR IP): Performed by: INTERNAL MEDICINE

## 2023-07-11 PROCEDURE — 6370000000 HC RX 637 (ALT 250 FOR IP)

## 2023-07-11 PROCEDURE — 80048 BASIC METABOLIC PNL TOTAL CA: CPT

## 2023-07-11 PROCEDURE — 80053 COMPREHEN METABOLIC PANEL: CPT

## 2023-07-11 PROCEDURE — 2700000000 HC OXYGEN THERAPY PER DAY

## 2023-07-11 PROCEDURE — 84133 ASSAY OF URINE POTASSIUM: CPT

## 2023-07-11 PROCEDURE — 83930 ASSAY OF BLOOD OSMOLALITY: CPT

## 2023-07-11 PROCEDURE — 85025 COMPLETE CBC W/AUTO DIFF WBC: CPT

## 2023-07-11 PROCEDURE — 84540 ASSAY OF URINE/UREA-N: CPT

## 2023-07-11 PROCEDURE — 82570 ASSAY OF URINE CREATININE: CPT

## 2023-07-11 PROCEDURE — 99232 SBSQ HOSP IP/OBS MODERATE 35: CPT | Performed by: FAMILY MEDICINE

## 2023-07-11 PROCEDURE — 84300 ASSAY OF URINE SODIUM: CPT

## 2023-07-11 PROCEDURE — 83880 ASSAY OF NATRIURETIC PEPTIDE: CPT

## 2023-07-11 PROCEDURE — 2580000003 HC RX 258

## 2023-07-11 RX ADMIN — LEVETIRACETAM 500 MG: 500 TABLET, FILM COATED ORAL at 20:16

## 2023-07-11 RX ADMIN — FUROSEMIDE 40 MG: 40 TABLET ORAL at 08:53

## 2023-07-11 RX ADMIN — IPRATROPIUM BROMIDE AND ALBUTEROL SULFATE 1 DOSE: .5; 2.5 SOLUTION RESPIRATORY (INHALATION) at 19:33

## 2023-07-11 RX ADMIN — LEVETIRACETAM 500 MG: 500 TABLET, FILM COATED ORAL at 08:33

## 2023-07-11 RX ADMIN — SODIUM CHLORIDE, PRESERVATIVE FREE 10 ML: 5 INJECTION INTRAVENOUS at 08:34

## 2023-07-11 RX ADMIN — APIXABAN 5 MG: 5 TABLET, FILM COATED ORAL at 08:53

## 2023-07-11 RX ADMIN — IPRATROPIUM BROMIDE AND ALBUTEROL SULFATE 1 DOSE: .5; 2.5 SOLUTION RESPIRATORY (INHALATION) at 11:28

## 2023-07-11 RX ADMIN — DESMOPRESSIN ACETATE 200 MCG: 0.1 TABLET ORAL at 20:17

## 2023-07-11 RX ADMIN — METOPROLOL SUCCINATE 25 MG: 25 TABLET, EXTENDED RELEASE ORAL at 08:53

## 2023-07-11 RX ADMIN — LEVOTHYROXINE SODIUM 75 MCG: 0.07 TABLET ORAL at 08:33

## 2023-07-11 RX ADMIN — DULOXETINE 60 MG: 60 CAPSULE, DELAYED RELEASE ORAL at 08:33

## 2023-07-11 RX ADMIN — SODIUM CHLORIDE, PRESERVATIVE FREE 10 ML: 5 INJECTION INTRAVENOUS at 20:38

## 2023-07-11 RX ADMIN — AMIODARONE HYDROCHLORIDE 200 MG: 200 TABLET ORAL at 08:53

## 2023-07-11 RX ADMIN — MIDODRINE HYDROCHLORIDE 2.5 MG: 5 TABLET ORAL at 08:53

## 2023-07-11 RX ADMIN — BUDESONIDE 500 MCG: 0.5 SUSPENSION RESPIRATORY (INHALATION) at 19:33

## 2023-07-11 RX ADMIN — DIGOXIN 125 MCG: 250 INJECTION, SOLUTION INTRAMUSCULAR; INTRAVENOUS at 10:50

## 2023-07-11 RX ADMIN — METOPROLOL SUCCINATE 25 MG: 25 TABLET, EXTENDED RELEASE ORAL at 20:16

## 2023-07-11 RX ADMIN — APIXABAN 5 MG: 5 TABLET, FILM COATED ORAL at 20:16

## 2023-07-11 RX ADMIN — OXYCODONE HYDROCHLORIDE 5 MG: 5 TABLET ORAL at 01:54

## 2023-07-11 RX ADMIN — ARFORMOTEROL TARTRATE 15 MCG: 15 SOLUTION RESPIRATORY (INHALATION) at 07:44

## 2023-07-11 RX ADMIN — BUDESONIDE 500 MCG: 0.5 SUSPENSION RESPIRATORY (INHALATION) at 07:44

## 2023-07-11 RX ADMIN — ARFORMOTEROL TARTRATE 15 MCG: 15 SOLUTION RESPIRATORY (INHALATION) at 19:33

## 2023-07-11 RX ADMIN — MUPIROCIN: 20 OINTMENT TOPICAL at 08:38

## 2023-07-11 RX ADMIN — PREDNISONE 5 MG: 5 TABLET ORAL at 08:33

## 2023-07-11 RX ADMIN — MUPIROCIN: 20 OINTMENT TOPICAL at 20:32

## 2023-07-11 RX ADMIN — ACETAMINOPHEN 650 MG: 325 TABLET ORAL at 20:29

## 2023-07-11 RX ADMIN — VANCOMYCIN HYDROCHLORIDE 1000 MG: 1 INJECTION, POWDER, LYOPHILIZED, FOR SOLUTION INTRAVENOUS at 20:24

## 2023-07-11 RX ADMIN — OXYCODONE HYDROCHLORIDE 5 MG: 5 TABLET ORAL at 17:33

## 2023-07-11 RX ADMIN — CEFEPIME 2000 MG: 2 INJECTION, POWDER, FOR SOLUTION INTRAVENOUS at 12:36

## 2023-07-11 RX ADMIN — GUAIFENESIN 400 MG: 400 TABLET ORAL at 17:33

## 2023-07-11 RX ADMIN — IPRATROPIUM BROMIDE AND ALBUTEROL SULFATE 1 DOSE: .5; 2.5 SOLUTION RESPIRATORY (INHALATION) at 07:45

## 2023-07-11 RX ADMIN — DESMOPRESSIN ACETATE 200 MCG: 0.1 TABLET ORAL at 08:54

## 2023-07-11 ASSESSMENT — PAIN DESCRIPTION - DESCRIPTORS
DESCRIPTORS: ACHING;DISCOMFORT;DULL
DESCRIPTORS: SORE;SHARP
DESCRIPTORS: DISCOMFORT
DESCRIPTORS: DISCOMFORT

## 2023-07-11 ASSESSMENT — PAIN DESCRIPTION - LOCATION
LOCATION: BACK
LOCATION: LEG;SHOULDER
LOCATION: CHEST
LOCATION: CHEST

## 2023-07-11 ASSESSMENT — PAIN DESCRIPTION - ORIENTATION
ORIENTATION: RIGHT;LEFT
ORIENTATION: LEFT;RIGHT

## 2023-07-11 ASSESSMENT — PAIN SCALES - GENERAL
PAINLEVEL_OUTOF10: 8

## 2023-07-11 ASSESSMENT — PAIN - FUNCTIONAL ASSESSMENT: PAIN_FUNCTIONAL_ASSESSMENT: PREVENTS OR INTERFERES SOME ACTIVE ACTIVITIES AND ADLS

## 2023-07-11 NOTE — PROGRESS NOTES
Date: 7/10/2023    Time: 9:51 PM    Patient Placed On BIPAP/CPAP/ Non-Invasive Ventilation? Yes    If no must comment. Facial area red/color change? No           If YES are Blister/Lesion present? No   If yes must notify nursing staff  BIPAP/CPAP skin barrier?   Yes    Skin barrier type:mepilexlite       Comments:        Nicol Patton RCP

## 2023-07-11 NOTE — PROGRESS NOTES
[address]       BVPD:                                                               Patient Name: Leobardo Alejandre  MRN: [id number]  : 1955  Room: [address] [place], [place] [address]    Evaluating OT: TWIN Troncoso,  OTR/L; [id number]    Referring Provider: Thom Germain DO   Specific Provider Orders/Date: OT eval and treat (23)     Diagnosis: Acute respiratory failure with hypoxia ([address]) [J96.01]  Acute respiratory failure with hypoxemia ([address]) [J96.01]  Pneumonia of left lower lobe due to infectious organism [J18.9]     Reason for admission: Pt admitted with SOB, pneumonia.     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    Backache     Benign hypertension     CHF (congestive heart failure) (HCC)     Chronic back pain     Chronic kidney disease     stage 3    Chronic pain disorder     Chronic, continuous use of opioids     Compression fracture of thoracic vertebra (HCC)     T10    COPD (chronic obstructive pulmonary disease) ([address])     Debility     Deformity of ankle and foot, acquired 2011    Depression     Diabetes insipidus ([address])     Diverticulitis     Dry eyes     Encephalopathy acute 2017    Gait disturbance     GERD (gastroesophageal reflux disease)     Hemorrhoids 2012    Hernia     History of blood transfusion approx 2017    History of Clostridium difficile     negative culture 12-15-15; resolved    Hyperlipidemia     Hypothyroidism     Ischemic colitis, enteritis, or enterocolitis ([address])     Long term (current) use of systemic steroids

## 2023-07-11 NOTE — PROGRESS NOTES
p.o. daily, DDAVP 200 mcg twice a day. IMPRESSION/RECOMMENDATIONS:      Recurrent IRMA stage I, significant increase of creatinine level has occurred last 24 hours from 1.1 to 1.7 mg/dL. No significant hypotension last 24 hours and has no urinary retention-bladder scan only 87 cc. We will monitor. 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   Secondary adrenal insufficiency, 2/2 sarcoidosis induced hypopituitarism, on prednisone 5 mg p.o. daily  HTN, on metoprolol and Lasix 40 mg daily, hydralazine on hold  HFpEF 60%, proBNP 4314 > 2375 , on metoprolol and digoxin  ---------------------------------------------------------------  Pulmonary HTN  Multilobar pneumonia, on cefepime and vancomycin  PAF, on digoxin, metoprolol, amiodarone, and apixaban  Hypothyroidism, probably secondary, on levothyroxine  New onset seizures, on levetiracetam  Anemia, normocytic      Plan:     Obtain pro-BNP  Continue DDAVP 200 mcg p.o. twice daily  Continue Lasix 40 mg daily  Continue prednisone 5 mg PO daily   Continue metoprolol succinate 100 mg daily  Continue to monitor sodium level  Continue to monitor renal function  Continue to monitor blood pressure       Electronically signed by Marco Maloney MD on 7/11/2023 at 9:18 AM

## 2023-07-11 NOTE — PROGRESS NOTES
above)    In response to nitric oxide, the mPAP decreased from 56 mmHg to 50 mmHg, CI paradoxically decreased from 2.5 L/min/m2 to 2.0 L/min/m2. The PVR has increased from 7.80 POSEY to 9.41 POSEY, while the PAWP decreased from 25 to 18 mmHg. The worsening PVR was due to combination of reduced CO and reduction in PAWP. Plan: Continue with ongoing optimization of LHD/HFpEF with current diuretic regimen. Educated on low sodium diet. Gentle diuresis. BP control. Consider initiation of PH therapy once volume status optimized given significant precapillary component. Labs:  Lab Results   Component Value Date/Time    WBC 6.0 07/11/2023 04:09 AM    HGB 9.6 07/11/2023 04:09 AM    HCT 33.4 07/11/2023 04:09 AM    HCT 41.0 04/28/2023 02:54 PM    MCV 92.0 07/11/2023 04:09 AM    MCH 26.4 07/11/2023 04:09 AM    MCHC 28.7 07/11/2023 04:09 AM    RDW 14.2 07/11/2023 04:09 AM     07/11/2023 04:09 AM    MPV 11.7 07/11/2023 04:09 AM     Lab Results   Component Value Date/Time     07/11/2023 04:09 AM    K 3.7 07/11/2023 04:09 AM    CL 98 07/11/2023 04:09 AM    CO2 31 07/11/2023 04:09 AM    BUN 24 07/11/2023 04:09 AM    CREATININE 1.7 07/11/2023 04:09 AM    LABALBU 3.6 07/11/2023 04:09 AM    LABALBU 3.6 05/02/2012 07:40 PM    CALCIUM 9.3 07/11/2023 04:09 AM    GFRAA >60 10/04/2022 06:02 AM    LABGLOM 33 07/11/2023 04:09 AM     Lab Results   Component Value Date/Time    PROTIME 12.0 03/30/2023 12:19 PM    PROTIME 12.7 05/02/2012 07:40 PM    INR 1.1 03/30/2023 12:19 PM     No results for input(s): PROBNP in the last 72 hours. No results for input(s): PROCAL in the last 72 hours. This SmartLink has not been configured with any valid records. Micro:  No results for input(s): CULTRESP in the last 72 hours. No results for input(s): LABGRAM in the last 72 hours. No results for input(s): LEGUR in the last 72 hours. No results for input(s): STREPNEUMAGU in the last 72 hours.   No results for input(s): LP1UAG in the last 72

## 2023-07-11 NOTE — PROGRESS NOTES
[facility] Inpatient   Resident Progress Note    S:  Hospital day: 6   Brief Synopsis: Prabhjot Farrar is a 78 y.o. female with a PMH of combined congestive heart failure, CKD, pulmonary sarcoidosis, pulmonary hypertension,ventral hernia, diabetes insipidus, hypothyroidism, HTN, and chronic normocytic anemia who presented on 7/5/23 for severe shortness of breath and cough productive of green sputum. Patient was placed on AVAPS but was decompensating, having increased work of breathing and tachypnea. Patient also developed afib RVR (had been in sinus rhythm upon presentation). Workup in ED showed multifocal pneumonia. Patient was admitted to ICU with acute hypoxic respiratory failure 2/2 pneumonia.      Overnight/interim:   Patient currently saturating 97% on 7L O2- baseline from home is 3-4L  Patient was on BiPAP overnight, states it helped her breathing significantly  Denies chest pain, heart palpitations, shortness of breath, abdominal pain, nausea, vomiting      Cont meds:    dextrose      sodium chloride       Scheduled meds:    mupirocin   Each Nostril BID    vancomycin  1,000 mg IntraVENous Q24H    midodrine  2.5 mg Oral TID WC    furosemide  40 mg Oral Daily    budesonide  500 mcg Nebulization BID    arformoterol tartrate  15 mcg Nebulization BID    sodium chloride flush  5-40 mL IntraVENous 2 times per day    ipratropium 0.5 mg-albuterol 2.5 mg  1 Dose Inhalation Q4H WA    cefepime  2,000 mg IntraVENous Q12H    desmopressin  200 mcg Oral BID    DULoxetine  60 mg Oral Daily    apixaban  5 mg Oral BID    [Held by provider] hydrALAZINE  25 mg Oral 3 times per day    levETIRAcetam  500 mg Oral BID    levothyroxine  75 mcg Oral Daily    amiodarone  200 mg Oral Daily    metoprolol succinate  25 mg Oral BID    predniSONE  5 mg Oral Daily    digoxin  125 mcg IntraVENous Daily     PRN meds: glucose, dextrose bolus **OR** dextrose bolus, glucagon (rDNA), dextrose, oxyCODONE, sodium chloride flush, sodium

## 2023-07-11 NOTE — PLAN OF CARE
Problem: Chronic Conditions and Co-morbidities  Goal: Patient's chronic conditions and co-morbidity symptoms are monitored and maintained or improved  7/11/2023 1003 by Orlando Hedrick RN  Outcome: Progressing  7/11/2023 0452 by Pasha Mejía RN  Outcome: Progressing     Problem: Discharge Planning  Goal: Discharge to home or other facility with appropriate resources  7/11/2023 1003 by Orlando Hedrick RN  Outcome: Progressing  7/11/2023 0452 by Pasha Mejía RN  Outcome: Progressing     Problem: Pain  Goal: Verbalizes/displays adequate comfort level or baseline comfort level  7/11/2023 1003 by Orlando Hedrick RN  Outcome: Progressing  7/11/2023 0452 by Pasha Mejía RN  Outcome: Progressing     Problem: Safety - Adult  Goal: Free from fall injury  7/11/2023 1003 by Orlando Hedrick RN  Outcome: Progressing  7/11/2023 0452 by Pasha Mejía RN  Outcome: Progressing     Problem: Skin/Tissue Integrity  Goal: Absence of new skin breakdown  Description: 1. Monitor for areas of redness and/or skin breakdown  2. Assess vascular access sites hourly  3. Every 4-6 hours minimum:  Change oxygen saturation probe site  4. Every 4-6 hours:  If on nasal continuous positive airway pressure, respiratory therapy assess nares and determine need for appliance change or resting period.   7/11/2023 1003 by Orlando Hedrick RN  Outcome: Progressing  7/11/2023 0452 by Pasha Mejía RN  Outcome: Progressing     Problem: ABCDS Injury Assessment  Goal: Absence of physical injury  7/11/2023 1003 by Orlando Hedrick RN  Outcome: Progressing  7/11/2023 0452 by Pasha Mejía RN  Outcome: Progressing     Problem: Nutrition Deficit:  Goal: Optimize nutritional status  7/11/2023 0452 by Pasha Mejía RN  Outcome: Progressing     Problem: Neurosensory - Adult  Goal: Achieves stable or improved neurological status  7/11/2023

## 2023-07-11 NOTE — CARE COORDINATION
Plan at this time is home with  and [facility] when medically ready. Home health care orders are in. Ortho surgery, nephrology, cardiology and pulmonology are all following. Per Natalio Alatorre from [facility], patient has a Bipap respiratory assist advice and 02 6 ltr with them. She is currently on O2 6 liters high flow nasal cannula with O2 sat 95%. Last  PT/OT [facility] scorers are 10/24 & 15/24. Patient is adamant about not going to [facility] but remains agreeable to home health care. Therapy department notified of need for daily therapy TX's since plan is home. Patient's  will transport her home at time of discharge and will bring a portable O2 tank for the ride home.     Sheila Rosa RN CM  [phone]

## 2023-07-11 NOTE — PROGRESS NOTES
Pharmacy Consultation Note  (Antibiotic Dosing and Monitoring)    Initial consult date: 7/5/23  Consulting physician/provider: Dr. Salud Tripathi  Drug: Vancomycin  Indication: HAP; 7 days    Age/  Gender Height Weight IBW  Allergy Information   67 y.o./female 5' 5\" (165.1 cm) 165 lb (74.8 kg)     Ideal body weight: 57 kg (125 lb 10.6 oz)  Adjusted ideal body weight: 62.4 kg (137 lb 10.2 oz)   Patient has no known allergies. Renal Function:  Recent Labs     07/09/23  0741 07/10/23  0428 07/11/23  0409   BUN 21 19 24*   CREATININE 0.9 1.1* 1.7*         Intake/Output Summary (Last 24 hours) at 7/11/2023 1029  Last data filed at 7/10/2023 1415  Gross per 24 hour   Intake 240 ml   Output 1100 ml   Net -860 ml         Vancomycin Monitoring:  Trough:  No results for input(s): VANCOTROUGH in the last 72 hours. Random:    Recent Labs     07/09/23  0741   VANCORANDOM 24.3         No results for input(s): Thornell Salaam in the last 72 hours. Historical Cultures:  Organism   Date Value Ref Range Status   07/05/2023 MRSA nasal colonization (A)  Final     No results for input(s): BC in the last 72 hours. Vancomycin Administration Times:  Recent vancomycin administrations                     vancomycin (VANCOCIN) 1,000 mg in sodium chloride 0.9 % 250 mL IVPB ([id number]) (mg) 1,000 mg New Bag 07/10/23 2129     1,000 mg New Bag 07/09/23 2049     1,000 mg New Bag 07/08/23 2109          Assessment:  Patient is a 78 y.o. female who has been initiated on vancomycin  Estimated Creatinine Clearance: 32 mL/min (A) (based on SCr of 1.7 mg/dL (H)). To dose vancomycin, pharmacy will be utilizing [employer] calculation software for goal AUC/-600 mg/L-hr (predicted AUC/ALE = 518, Tr =15 mcg/mL)  Vancomycin day 7, afebrile, WBC wnl (6.0). 7 days empiric therapy planned; ending after 7/11 evening dose. Plan:   Will continue vancomycin 1000 mg IV q 24 hr   Random level will be collected as needed to appropriately monitor   Will

## 2023-07-12 ENCOUNTER — HOSPITAL ENCOUNTER (OUTPATIENT)
Dept: OTHER | Age: 68
Setting detail: THERAPIES SERIES
Discharge: HOME | End: 2023-07-12

## 2023-07-12 LAB
ALBUMIN SERPL-MCNC: 3.3 G/DL (ref 3.5–5.2)
ALP SERPL-CCNC: 78 U/L (ref 35–104)
ALT SERPL-CCNC: 11 U/L (ref 0–32)
ANION GAP SERPL CALCULATED.3IONS-SCNC: 10 MMOL/L (ref 7–16)
ANION GAP SERPL CALCULATED.3IONS-SCNC: 14 MMOL/L (ref 7–16)
ANISOCYTOSIS: ABNORMAL
AST SERPL-CCNC: 16 U/L (ref 0–31)
BASOPHILS # BLD: 0.03 E9/L (ref 0–0.2)
BASOPHILS NFR BLD: 0.6 % (ref 0–2)
BILIRUB SERPL-MCNC: 0.3 MG/DL (ref 0–1.2)
BUN SERPL-MCNC: 33 MG/DL (ref 6–23)
BUN SERPL-MCNC: 33 MG/DL (ref 6–23)
BURR CELLS: ABNORMAL
CALCIUM SERPL-MCNC: 9.4 MG/DL (ref 8.6–10.2)
CALCIUM SERPL-MCNC: 9.6 MG/DL (ref 8.6–10.2)
CHLORIDE SERPL-SCNC: 97 MMOL/L (ref 98–107)
CHLORIDE SERPL-SCNC: 98 MMOL/L (ref 98–107)
CO2 SERPL-SCNC: 28 MMOL/L (ref 22–29)
CO2 SERPL-SCNC: 33 MMOL/L (ref 22–29)
CREAT SERPL-MCNC: 2.2 MG/DL (ref 0.5–1)
CREAT SERPL-MCNC: 2.3 MG/DL (ref 0.5–1)
EOSINOPHIL # BLD: 0.28 E9/L (ref 0.05–0.5)
EOSINOPHIL NFR BLD: 5.7 % (ref 0–6)
ERYTHROCYTE [DISTWIDTH] IN BLOOD BY AUTOMATED COUNT: 14.3 FL (ref 11.5–15)
GLUCOSE SERPL-MCNC: 110 MG/DL (ref 74–99)
GLUCOSE SERPL-MCNC: 72 MG/DL (ref 74–99)
HCT VFR BLD AUTO: 33.1 % (ref 34–48)
HGB BLD-MCNC: 9.4 G/DL (ref 11.5–15.5)
IMM GRANULOCYTES # BLD: 0.04 E9/L
IMM GRANULOCYTES NFR BLD: 0.8 % (ref 0–5)
LYMPHOCYTES # BLD: 0.67 E9/L (ref 1.5–4)
LYMPHOCYTES NFR BLD: 13.8 % (ref 20–42)
MCH RBC QN AUTO: 26.6 PG (ref 26–35)
MCHC RBC AUTO-ENTMCNC: 28.4 % (ref 32–34.5)
MCV RBC AUTO: 93.8 FL (ref 80–99.9)
METER GLUCOSE: 84 MG/DL (ref 74–99)
MONOCYTES # BLD: 0.69 E9/L (ref 0.1–0.95)
MONOCYTES NFR BLD: 14.2 % (ref 2–12)
NEUTROPHILS # BLD: 3.16 E9/L (ref 1.8–7.3)
NEUTS SEG NFR BLD: 64.9 % (ref 43–80)
OVALOCYTES: ABNORMAL
PLATELET # BLD AUTO: 173 E9/L (ref 130–450)
PMV BLD AUTO: 11.2 FL (ref 7–12)
POIKILOCYTES: ABNORMAL
POLYCHROMASIA: ABNORMAL
POTASSIUM SERPL-SCNC: 3.5 MMOL/L (ref 3.5–5)
POTASSIUM SERPL-SCNC: 3.6 MMOL/L (ref 3.5–5)
PROT SERPL-MCNC: 6.4 G/DL (ref 6.4–8.3)
RBC # BLD AUTO: 3.53 E12/L (ref 3.5–5.5)
SODIUM SERPL-SCNC: 140 MMOL/L (ref 132–146)
SODIUM SERPL-SCNC: 140 MMOL/L (ref 132–146)
TEAR DROP CELLS: ABNORMAL
WBC # BLD: 4.9 E9/L (ref 4.5–11.5)

## 2023-07-12 PROCEDURE — 6370000000 HC RX 637 (ALT 250 FOR IP)

## 2023-07-12 PROCEDURE — 1200000000 HC SEMI PRIVATE

## 2023-07-12 PROCEDURE — 99232 SBSQ HOSP IP/OBS MODERATE 35: CPT | Performed by: FAMILY MEDICINE

## 2023-07-12 PROCEDURE — 2700000000 HC OXYGEN THERAPY PER DAY

## 2023-07-12 PROCEDURE — 80053 COMPREHEN METABOLIC PANEL: CPT

## 2023-07-12 PROCEDURE — 6370000000 HC RX 637 (ALT 250 FOR IP): Performed by: INTERNAL MEDICINE

## 2023-07-12 PROCEDURE — 97530 THERAPEUTIC ACTIVITIES: CPT

## 2023-07-12 PROCEDURE — 6360000002 HC RX W HCPCS

## 2023-07-12 PROCEDURE — 94660 CPAP INITIATION&MGMT: CPT

## 2023-07-12 PROCEDURE — 80048 BASIC METABOLIC PNL TOTAL CA: CPT

## 2023-07-12 PROCEDURE — 2580000003 HC RX 258

## 2023-07-12 PROCEDURE — 2580000003 HC RX 258: Performed by: INTERNAL MEDICINE

## 2023-07-12 PROCEDURE — 36415 COLL VENOUS BLD VENIPUNCTURE: CPT

## 2023-07-12 PROCEDURE — 6360000002 HC RX W HCPCS: Performed by: INTERNAL MEDICINE

## 2023-07-12 PROCEDURE — 82962 GLUCOSE BLOOD TEST: CPT

## 2023-07-12 PROCEDURE — 85025 COMPLETE CBC W/AUTO DIFF WBC: CPT

## 2023-07-12 PROCEDURE — 94640 AIRWAY INHALATION TREATMENT: CPT

## 2023-07-12 RX ORDER — SODIUM CHLORIDE 9 MG/ML
INJECTION, SOLUTION INTRAVENOUS CONTINUOUS
Status: DISCONTINUED | OUTPATIENT
Start: 2023-07-12 | End: 2023-07-14

## 2023-07-12 RX ORDER — METHOCARBAMOL 750 MG/1
1500 TABLET, FILM COATED ORAL 3 TIMES DAILY
Status: DISCONTINUED | OUTPATIENT
Start: 2023-07-12 | End: 2023-07-13

## 2023-07-12 RX ADMIN — IPRATROPIUM BROMIDE AND ALBUTEROL SULFATE 1 DOSE: .5; 2.5 SOLUTION RESPIRATORY (INHALATION) at 16:13

## 2023-07-12 RX ADMIN — DESMOPRESSIN ACETATE 200 MCG: 0.1 TABLET ORAL at 20:05

## 2023-07-12 RX ADMIN — MUPIROCIN: 20 OINTMENT TOPICAL at 08:20

## 2023-07-12 RX ADMIN — LEVOTHYROXINE SODIUM 75 MCG: 0.07 TABLET ORAL at 08:18

## 2023-07-12 RX ADMIN — IPRATROPIUM BROMIDE AND ALBUTEROL SULFATE 1 DOSE: .5; 2.5 SOLUTION RESPIRATORY (INHALATION) at 08:56

## 2023-07-12 RX ADMIN — LEVETIRACETAM 500 MG: 500 TABLET, FILM COATED ORAL at 08:19

## 2023-07-12 RX ADMIN — METHOCARBAMOL 1500 MG: 750 TABLET ORAL at 20:05

## 2023-07-12 RX ADMIN — CEFEPIME 2000 MG: 2 INJECTION, POWDER, FOR SOLUTION INTRAVENOUS at 00:07

## 2023-07-12 RX ADMIN — ARFORMOTEROL TARTRATE 15 MCG: 15 SOLUTION RESPIRATORY (INHALATION) at 08:59

## 2023-07-12 RX ADMIN — APIXABAN 5 MG: 5 TABLET, FILM COATED ORAL at 08:19

## 2023-07-12 RX ADMIN — DIGOXIN 125 MCG: 250 INJECTION, SOLUTION INTRAMUSCULAR; INTRAVENOUS at 08:20

## 2023-07-12 RX ADMIN — MUPIROCIN: 20 OINTMENT TOPICAL at 20:06

## 2023-07-12 RX ADMIN — OXYCODONE HYDROCHLORIDE 5 MG: 5 TABLET ORAL at 09:53

## 2023-07-12 RX ADMIN — METHOCARBAMOL 1500 MG: 750 TABLET ORAL at 14:25

## 2023-07-12 RX ADMIN — IPRATROPIUM BROMIDE AND ALBUTEROL SULFATE 1 DOSE: .5; 2.5 SOLUTION RESPIRATORY (INHALATION) at 12:58

## 2023-07-12 RX ADMIN — PREDNISONE 5 MG: 5 TABLET ORAL at 08:21

## 2023-07-12 RX ADMIN — DULOXETINE 60 MG: 60 CAPSULE, DELAYED RELEASE ORAL at 08:19

## 2023-07-12 RX ADMIN — SODIUM CHLORIDE: 9 INJECTION, SOLUTION INTRAVENOUS at 12:25

## 2023-07-12 RX ADMIN — BUDESONIDE 500 MCG: 0.5 SUSPENSION RESPIRATORY (INHALATION) at 08:58

## 2023-07-12 RX ADMIN — SODIUM CHLORIDE, PRESERVATIVE FREE 10 ML: 5 INJECTION INTRAVENOUS at 20:05

## 2023-07-12 RX ADMIN — BUDESONIDE 500 MCG: 0.5 SUSPENSION RESPIRATORY (INHALATION) at 19:25

## 2023-07-12 RX ADMIN — FUROSEMIDE 40 MG: 40 TABLET ORAL at 08:19

## 2023-07-12 RX ADMIN — MIDODRINE HYDROCHLORIDE 2.5 MG: 5 TABLET ORAL at 08:18

## 2023-07-12 RX ADMIN — METOPROLOL SUCCINATE 25 MG: 25 TABLET, EXTENDED RELEASE ORAL at 20:05

## 2023-07-12 RX ADMIN — SODIUM CHLORIDE, PRESERVATIVE FREE 10 ML: 5 INJECTION INTRAVENOUS at 08:20

## 2023-07-12 RX ADMIN — ARFORMOTEROL TARTRATE 15 MCG: 15 SOLUTION RESPIRATORY (INHALATION) at 19:25

## 2023-07-12 RX ADMIN — APIXABAN 5 MG: 5 TABLET, FILM COATED ORAL at 20:05

## 2023-07-12 RX ADMIN — IPRATROPIUM BROMIDE AND ALBUTEROL SULFATE 1 DOSE: .5; 2.5 SOLUTION RESPIRATORY (INHALATION) at 19:24

## 2023-07-12 RX ADMIN — DESMOPRESSIN ACETATE 200 MCG: 0.1 TABLET ORAL at 08:18

## 2023-07-12 RX ADMIN — LEVETIRACETAM 500 MG: 500 TABLET, FILM COATED ORAL at 20:05

## 2023-07-12 RX ADMIN — AMIODARONE HYDROCHLORIDE 200 MG: 200 TABLET ORAL at 08:19

## 2023-07-12 RX ADMIN — METOPROLOL SUCCINATE 25 MG: 25 TABLET, EXTENDED RELEASE ORAL at 08:19

## 2023-07-12 ASSESSMENT — PAIN DESCRIPTION - DESCRIPTORS
DESCRIPTORS: ACHING;DISCOMFORT;DULL
DESCRIPTORS: ACHING;DISCOMFORT;DULL

## 2023-07-12 ASSESSMENT — PAIN SCALES - WONG BAKER: WONGBAKER_NUMERICALRESPONSE: 0

## 2023-07-12 ASSESSMENT — PAIN DESCRIPTION - LOCATION
LOCATION: SHOULDER
LOCATION: SHOULDER

## 2023-07-12 ASSESSMENT — PAIN DESCRIPTION - ORIENTATION
ORIENTATION: RIGHT
ORIENTATION: RIGHT

## 2023-07-12 ASSESSMENT — PAIN - FUNCTIONAL ASSESSMENT
PAIN_FUNCTIONAL_ASSESSMENT: ACTIVITIES ARE NOT PREVENTED
PAIN_FUNCTIONAL_ASSESSMENT: PREVENTS OR INTERFERES SOME ACTIVE ACTIVITIES AND ADLS

## 2023-07-12 ASSESSMENT — PAIN SCALES - GENERAL
PAINLEVEL_OUTOF10: 7
PAINLEVEL_OUTOF10: 9
PAINLEVEL_OUTOF10: 0

## 2023-07-12 NOTE — PLAN OF CARE
Problem: Chronic Conditions and Co-morbidities  Goal: Patient's chronic conditions and co-morbidity symptoms are monitored and maintained or improved  7/12/2023 1010 by Eyal Rader RN  Outcome: Progressing  7/12/2023 0419 by Marques Yun RN  Outcome: Progressing  Flowsheets (Taken 7/11/2023 2041)  Care Plan - Patient's Chronic Conditions and Co-Morbidity Symptoms are Monitored and Maintained or Improved: Monitor and assess patient's chronic conditions and comorbid symptoms for stability, deterioration, or improvement     Problem: Discharge Planning  Goal: Discharge to home or other facility with appropriate resources  7/12/2023 1010 by Eyal Rader RN  Outcome: Progressing  7/12/2023 0419 by Marques Yun RN  Outcome: Progressing  Flowsheets (Taken 7/11/2023 2041)  Discharge to home or other facility with appropriate resources: Identify barriers to discharge with patient and caregiver     Problem: Pain  Goal: Verbalizes/displays adequate comfort level or baseline comfort level  7/12/2023 1010 by Eyal Rader RN  Outcome: Progressing  7/12/2023 0419 by Marques Yun RN  Outcome: Progressing  Flowsheets  Taken 7/11/2023 2029  Verbalizes/displays adequate comfort level or baseline comfort level: Encourage patient to monitor pain and request assistance  Taken 7/11/2023 2016  Verbalizes/displays adequate comfort level or baseline comfort level: Encourage patient to monitor pain and request assistance     Problem: Safety - Adult  Goal: Free from fall injury  7/12/2023 1010 by Eyal Rader RN  Outcome: Progressing  7/12/2023 0419 by Marques Yun RN  Outcome: Progressing  Flowsheets (Taken 7/12/2023 0038)  Free From Fall Injury: Instruct family/caregiver on patient safety     Problem: Skin/Tissue Integrity  Goal: Absence of new skin breakdown  Description: 1. Monitor for areas of redness and/or skin breakdown  2. Assess vascular access sites hourly  3.

## 2023-07-12 NOTE — PROGRESS NOTES
Physical Therapy  Physical Therapy Treatment    Name: Jaki Cole  : 1955  MRN: [id number]      Date of Service: 2023    Evaluating PT:  Flakito Oviedo PT, DPT  [id number]     Room #:  6771/9869-M  Diagnosis:  Acute respiratory failure with hypoxia ([address]) [J96.01]  Acute respiratory failure with hypoxemia ([address]) [J96.01]  Pneumonia of left lower lobe due to infectious organism [J18.9]  PMHx/PSHx:   has a past medical history of A-fib ([address]), Abnormal mammogram, Acute on chronic respiratory failure ([address]), Anemia, Anemia due to chronic illness, Ankle fracture, left, 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[address]), Backache, Benign hypertension, CHF (congestive heart failure) ([facility]), Chronic back pain, Chronic kidney disease, Chronic pain disorder, Chronic, continuous use of opioids, Compression fracture of thoracic vertebra ([facility]), COPD (chronic obstructive pulmonary disease) ([address]), Debility, Deformity of ankle and foot, acquired, Depression, Diabetes insipidus ([address]), Diverticulitis, Dry eyes, Encephalopathy acute, Gait disturbance, GERD (gastroesophageal reflux disease), Hemorrhoids, Hernia, History of blood transfusion, History of Clostridium difficile, Hyperlipidemia, Hypothyroidism, Ischemic colitis, enteritis, or enterocolitis ([address]), Long term (current) use of systemic steroids, Long-term current use of steroids, Lumbar disc herniation, Myalgia and myositis, unspecified, Nephrosclerosis, Nonunion of fracture, Osteoarthritis, PAF (paroxysmal atrial fibrillation) ([address]), 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[address]), Pulmonary hypertension ([address]), Pulmonary sarcoidosis ([address]), Rectal bleeding, Rhabdomyolysis, Steroid-induced avascular necrosis of shoulder ([address]), Tibia fracture, and Ventral hernia without obstruction or gangrene. has a past surgical history that includes fracture surgery (); Kyphosis surgery; Lumbar disc surgery; Tibia / fibia lengthening; other surgical history (11); Abdomen surgery ();

## 2023-07-12 NOTE — PLAN OF CARE
- Adult  Goal: Absence of urinary retention  Outcome: Progressing     Problem: Infection - Adult  Goal: Absence of infection at discharge  Outcome: Progressing  Flowsheets  Taken 7/12/2023 0038  Absence of infection at discharge: Assess and monitor for signs and symptoms of infection  Taken 7/11/2023 2041  Absence of infection at discharge: Assess and monitor for signs and symptoms of infection

## 2023-07-12 NOTE — PROGRESS NOTES
[facility] Inpatient   Resident Progress Note    S:  Hospital day: 7   Brief Synopsis: Pauline Isaac is a 78 y.o. female with a PMH of combined congestive heart failure, CKD, pulmonary sarcoidosis, pulmonary hypertension,ventral hernia, diabetes insipidus, hypothyroidism, HTN, and chronic normocytic anemia who presented on 7/5/23 for severe shortness of breath and cough productive of green sputum. Patient was placed on AVAPS but was decompensating, having increased work of breathing and tachypnea. Patient also developed afib RVR (had been in sinus rhythm upon presentation). Workup in ED showed multifocal pneumonia. Patient was admitted to ICU with acute hypoxic respiratory failure 2/2 pneumonia.      Overnight/interim:   Patient saturating 98% on 5L O2- states home dose is 4-6L  States that she is in significant pain and unless she gets more pain medication she will not be working with physical therapy again today    Cont meds:    sodium chloride 75 mL/hr at 07/12/23 1225    dextrose      sodium chloride       Scheduled meds:    methocarbamol  1,500 mg Oral TID    mupirocin   Each Nostril BID    midodrine  2.5 mg Oral TID WC    [Held by provider] furosemide  40 mg Oral Daily    budesonide  500 mcg Nebulization BID    arformoterol tartrate  15 mcg Nebulization BID    sodium chloride flush  5-40 mL IntraVENous 2 times per day    ipratropium 0.5 mg-albuterol 2.5 mg  1 Dose Inhalation Q4H WA    desmopressin  200 mcg Oral BID    DULoxetine  60 mg Oral Daily    apixaban  5 mg Oral BID    [Held by provider] hydrALAZINE  25 mg Oral 3 times per day    levETIRAcetam  500 mg Oral BID    levothyroxine  75 mcg Oral Daily    amiodarone  200 mg Oral Daily    metoprolol succinate  25 mg Oral BID    predniSONE  5 mg Oral Daily    digoxin  125 mcg IntraVENous Daily     PRN meds: glucose, dextrose bolus **OR** dextrose bolus, glucagon (rDNA), dextrose, oxyCODONE, sodium chloride flush, sodium chloride, ondansetron **OR**

## 2023-07-12 NOTE — CARE COORDINATION
Plan is home with  and [facility] when medically ready. Home health care orders are in. Ortho surgery, nephrology, cardiology and pulmonology are all following. Per Kate Boss from [facility], patient has a Bipap respiratory assist advice and 02 6 ltr with them. She is currently on O2 6 liters high flow nasal cannula with O2 sat 96%. Last  PT/OT [facility] scorers are 10/24 & 15/24. Patient is adamant about not going to [facility] but remains agreeable to home health care. Therapy department notified of need for daily therapy TX's since plan is home. Patient's  will transport her home at time of discharge and will bring a portable O2 tank for the ride home.    Lisa Holt RN cm [phone]

## 2023-07-12 NOTE — PROGRESS NOTES
Pharmacy Consultation Note  (Antibiotic Dosing and Monitoring)    Initial consult date: 7/5/23  Consulting physician/provider: Dr. Lyndall Eisenmenger  Drug: Vancomycin  Indication: HAP; 7 days    Age/  Gender Height Weight IBW  Allergy Information   67 y.o./female 5' 5\" (165.1 cm) 165 lb (74.8 kg)     Ideal body weight: 57 kg (125 lb 10.6 oz)  Adjusted ideal body weight: 61.4 kg (135 lb 7 oz)   Patient has no known allergies. Renal Function:  Recent Labs     07/11/23  0409 07/11/23  1525 07/12/23  0417   BUN 24* 27* 33*   CREATININE 1.7* 1.9* 2.3*         Intake/Output Summary (Last 24 hours) at 7/12/2023 0937  Last data filed at 7/11/2023 2310  Gross per 24 hour   Intake 250 ml   Output 1200 ml   Net -950 ml         Vancomycin Monitoring:  Trough:  No results for input(s): VANCOTROUGH in the last 72 hours. Random:  No results for input(s): VANCORANDOM in the last 72 hours. No results for input(s): Lexi Booty in the last 72 hours. Historical Cultures:  Organism   Date Value Ref Range Status   07/05/2023 MRSA nasal colonization (A)  Final     No results for input(s): BC in the last 72 hours. Vancomycin Administration Times:  Recent vancomycin administrations                     vancomycin (VANCOCIN) 1,000 mg in sodium chloride 0.9 % 250 mL IVPB ([id number]) (mg) 1,000 mg New Bag 07/11/23 2024     1,000 mg New Bag 07/10/23 2129     1,000 mg New Bag 07/09/23 2049          Assessment:  Patient is a 78 y.o. female who has been initiated on vancomycin  Estimated Creatinine Clearance: 22 mL/min (A) (based on SCr of 2.3 mg/dL (H)). To dose vancomycin, pharmacy will be utilizing [employer] calculation software for goal AUC/-600 mg/L-hr (predicted AUC/ALE = 518, Tr =15 mcg/mL)  Vancomycin day 7, afebrile, WBC wnl (4.9). 7 days empiric therapy planned; ending after 7/12 evening dose. Plan:   Will continue vancomycin 1000 mg IV q 24 hr   Random level will be collected as needed to appropriately monitor   Will

## 2023-07-13 ENCOUNTER — APPOINTMENT (OUTPATIENT)
Dept: GENERAL RADIOLOGY | Age: 68
End: 2023-07-13
Payer: MEDICARE

## 2023-07-13 LAB
ALBUMIN SERPL-MCNC: 3.5 G/DL (ref 3.5–5.2)
ALP SERPL-CCNC: 74 U/L (ref 35–104)
ALT SERPL-CCNC: 10 U/L (ref 0–32)
ANION GAP SERPL CALCULATED.3IONS-SCNC: 15 MMOL/L (ref 7–16)
AST SERPL-CCNC: 19 U/L (ref 0–31)
BASOPHILS # BLD: 0.02 E9/L (ref 0–0.2)
BASOPHILS NFR BLD: 0.4 % (ref 0–2)
BILIRUB SERPL-MCNC: 0.3 MG/DL (ref 0–1.2)
BUN SERPL-MCNC: 29 MG/DL (ref 6–23)
CALCIUM SERPL-MCNC: 9.2 MG/DL (ref 8.6–10.2)
CHLORIDE SERPL-SCNC: 100 MMOL/L (ref 98–107)
CO2 SERPL-SCNC: 28 MMOL/L (ref 22–29)
CREAT SERPL-MCNC: 2 MG/DL (ref 0.5–1)
DIGOXIN SERPL-MCNC: 1.3 NG/ML (ref 0.8–2)
EOSINOPHIL # BLD: 0.26 E9/L (ref 0.05–0.5)
EOSINOPHIL NFR BLD: 4.8 % (ref 0–6)
ERYTHROCYTE [DISTWIDTH] IN BLOOD BY AUTOMATED COUNT: 14.1 FL (ref 11.5–15)
GLUCOSE SERPL-MCNC: 74 MG/DL (ref 74–99)
HCT VFR BLD AUTO: 31.3 % (ref 34–48)
HGB BLD-MCNC: 8.9 G/DL (ref 11.5–15.5)
HYPOCHROMIA: ABNORMAL
IMM GRANULOCYTES # BLD: 0.04 E9/L
IMM GRANULOCYTES NFR BLD: 0.7 % (ref 0–5)
LYMPHOCYTES # BLD: 0.6 E9/L (ref 1.5–4)
LYMPHOCYTES NFR BLD: 11.1 % (ref 20–42)
MAGNESIUM SERPL-MCNC: 2.1 MG/DL (ref 1.6–2.6)
MCH RBC QN AUTO: 25.9 PG (ref 26–35)
MCHC RBC AUTO-ENTMCNC: 28.4 % (ref 32–34.5)
MCV RBC AUTO: 91 FL (ref 80–99.9)
MONOCYTES # BLD: 0.7 E9/L (ref 0.1–0.95)
MONOCYTES NFR BLD: 13 % (ref 2–12)
NEUTROPHILS # BLD: 3.78 E9/L (ref 1.8–7.3)
NEUTS SEG NFR BLD: 70 % (ref 43–80)
OVALOCYTES: ABNORMAL
PLATELET # BLD AUTO: 213 E9/L (ref 130–450)
PMV BLD AUTO: 11.7 FL (ref 7–12)
POIKILOCYTES: ABNORMAL
POLYCHROMASIA: ABNORMAL
POTASSIUM SERPL-SCNC: 3.4 MMOL/L (ref 3.5–5)
PROT SERPL-MCNC: 6.4 G/DL (ref 6.4–8.3)
RBC # BLD AUTO: 3.44 E12/L (ref 3.5–5.5)
SODIUM SERPL-SCNC: 143 MMOL/L (ref 132–146)
TEAR DROP CELLS: ABNORMAL
WBC # BLD: 5.4 E9/L (ref 4.5–11.5)

## 2023-07-13 PROCEDURE — 94660 CPAP INITIATION&MGMT: CPT

## 2023-07-13 PROCEDURE — 6360000002 HC RX W HCPCS: Performed by: INTERNAL MEDICINE

## 2023-07-13 PROCEDURE — 1200000000 HC SEMI PRIVATE

## 2023-07-13 PROCEDURE — 71045 X-RAY EXAM CHEST 1 VIEW: CPT

## 2023-07-13 PROCEDURE — 36415 COLL VENOUS BLD VENIPUNCTURE: CPT

## 2023-07-13 PROCEDURE — 80162 ASSAY OF DIGOXIN TOTAL: CPT

## 2023-07-13 PROCEDURE — 2580000003 HC RX 258: Performed by: INTERNAL MEDICINE

## 2023-07-13 PROCEDURE — 85025 COMPLETE CBC W/AUTO DIFF WBC: CPT

## 2023-07-13 PROCEDURE — 2700000000 HC OXYGEN THERAPY PER DAY

## 2023-07-13 PROCEDURE — 80053 COMPREHEN METABOLIC PANEL: CPT

## 2023-07-13 PROCEDURE — 2580000003 HC RX 258

## 2023-07-13 PROCEDURE — 6370000000 HC RX 637 (ALT 250 FOR IP)

## 2023-07-13 PROCEDURE — 99232 SBSQ HOSP IP/OBS MODERATE 35: CPT | Performed by: FAMILY MEDICINE

## 2023-07-13 PROCEDURE — 94640 AIRWAY INHALATION TREATMENT: CPT

## 2023-07-13 PROCEDURE — 83735 ASSAY OF MAGNESIUM: CPT

## 2023-07-13 RX ORDER — POTASSIUM CHLORIDE 20 MEQ/1
40 TABLET, EXTENDED RELEASE ORAL ONCE
Status: COMPLETED | OUTPATIENT
Start: 2023-07-13 | End: 2023-07-13

## 2023-07-13 RX ORDER — DIGOXIN 125 MCG
125 TABLET ORAL DAILY
Status: DISCONTINUED | OUTPATIENT
Start: 2023-07-14 | End: 2023-07-19 | Stop reason: HOSPADM

## 2023-07-13 RX ORDER — METHOCARBAMOL 750 MG/1
750 TABLET, FILM COATED ORAL 4 TIMES DAILY PRN
Status: DISCONTINUED | OUTPATIENT
Start: 2023-07-13 | End: 2023-07-19 | Stop reason: HOSPADM

## 2023-07-13 RX ORDER — DIGOXIN 0.25 MG/ML
125 INJECTION INTRAMUSCULAR; INTRAVENOUS DAILY
Status: DISCONTINUED | OUTPATIENT
Start: 2023-07-14 | End: 2023-07-13 | Stop reason: SDUPTHER

## 2023-07-13 RX ADMIN — LEVETIRACETAM 500 MG: 500 TABLET, FILM COATED ORAL at 22:25

## 2023-07-13 RX ADMIN — DULOXETINE 60 MG: 60 CAPSULE, DELAYED RELEASE ORAL at 08:51

## 2023-07-13 RX ADMIN — ACETAMINOPHEN 650 MG: 325 TABLET ORAL at 10:36

## 2023-07-13 RX ADMIN — IPRATROPIUM BROMIDE AND ALBUTEROL SULFATE 1 DOSE: .5; 2.5 SOLUTION RESPIRATORY (INHALATION) at 21:12

## 2023-07-13 RX ADMIN — MUPIROCIN: 20 OINTMENT TOPICAL at 08:56

## 2023-07-13 RX ADMIN — METHOCARBAMOL 1500 MG: 750 TABLET ORAL at 08:51

## 2023-07-13 RX ADMIN — APIXABAN 5 MG: 5 TABLET, FILM COATED ORAL at 08:51

## 2023-07-13 RX ADMIN — LEVOTHYROXINE SODIUM 75 MCG: 0.07 TABLET ORAL at 08:50

## 2023-07-13 RX ADMIN — METOPROLOL SUCCINATE 25 MG: 25 TABLET, EXTENDED RELEASE ORAL at 22:25

## 2023-07-13 RX ADMIN — BUDESONIDE 500 MCG: 0.5 SUSPENSION RESPIRATORY (INHALATION) at 07:59

## 2023-07-13 RX ADMIN — MUPIROCIN: 20 OINTMENT TOPICAL at 22:35

## 2023-07-13 RX ADMIN — METOPROLOL SUCCINATE 25 MG: 25 TABLET, EXTENDED RELEASE ORAL at 08:50

## 2023-07-13 RX ADMIN — SODIUM CHLORIDE: 9 INJECTION, SOLUTION INTRAVENOUS at 16:48

## 2023-07-13 RX ADMIN — IPRATROPIUM BROMIDE AND ALBUTEROL SULFATE 1 DOSE: .5; 2.5 SOLUTION RESPIRATORY (INHALATION) at 11:44

## 2023-07-13 RX ADMIN — LEVETIRACETAM 500 MG: 500 TABLET, FILM COATED ORAL at 08:51

## 2023-07-13 RX ADMIN — ARFORMOTEROL TARTRATE 15 MCG: 15 SOLUTION RESPIRATORY (INHALATION) at 07:59

## 2023-07-13 RX ADMIN — IPRATROPIUM BROMIDE AND ALBUTEROL SULFATE 1 DOSE: .5; 2.5 SOLUTION RESPIRATORY (INHALATION) at 07:59

## 2023-07-13 RX ADMIN — DESMOPRESSIN ACETATE 200 MCG: 0.1 TABLET ORAL at 22:26

## 2023-07-13 RX ADMIN — SODIUM CHLORIDE, PRESERVATIVE FREE 10 ML: 5 INJECTION INTRAVENOUS at 22:35

## 2023-07-13 RX ADMIN — ACETAMINOPHEN 650 MG: 325 TABLET ORAL at 22:25

## 2023-07-13 RX ADMIN — PREDNISONE 5 MG: 5 TABLET ORAL at 08:50

## 2023-07-13 RX ADMIN — DIGOXIN 125 MCG: 250 INJECTION, SOLUTION INTRAMUSCULAR; INTRAVENOUS at 08:54

## 2023-07-13 RX ADMIN — DESMOPRESSIN ACETATE 200 MCG: 0.1 TABLET ORAL at 08:54

## 2023-07-13 RX ADMIN — IPRATROPIUM BROMIDE AND ALBUTEROL SULFATE 1 DOSE: .5; 2.5 SOLUTION RESPIRATORY (INHALATION) at 16:23

## 2023-07-13 RX ADMIN — OXYCODONE HYDROCHLORIDE 5 MG: 5 TABLET ORAL at 16:45

## 2023-07-13 RX ADMIN — POTASSIUM CHLORIDE 40 MEQ: 1500 TABLET, EXTENDED RELEASE ORAL at 08:51

## 2023-07-13 RX ADMIN — OXYCODONE HYDROCHLORIDE 5 MG: 5 TABLET ORAL at 04:03

## 2023-07-13 RX ADMIN — BUDESONIDE 500 MCG: 0.5 SUSPENSION RESPIRATORY (INHALATION) at 21:14

## 2023-07-13 RX ADMIN — APIXABAN 5 MG: 5 TABLET, FILM COATED ORAL at 22:25

## 2023-07-13 RX ADMIN — METHOCARBAMOL 750 MG: 750 TABLET ORAL at 22:25

## 2023-07-13 RX ADMIN — AMIODARONE HYDROCHLORIDE 200 MG: 200 TABLET ORAL at 08:51

## 2023-07-13 RX ADMIN — ARFORMOTEROL TARTRATE 15 MCG: 15 SOLUTION RESPIRATORY (INHALATION) at 21:13

## 2023-07-13 ASSESSMENT — PAIN DESCRIPTION - ORIENTATION
ORIENTATION: RIGHT;LEFT
ORIENTATION: LEFT;RIGHT
ORIENTATION: LEFT;RIGHT

## 2023-07-13 ASSESSMENT — PAIN DESCRIPTION - LOCATION
LOCATION: LEG;SHOULDER
LOCATION: LEG;SHOULDER
LOCATION: SHOULDER;LEG

## 2023-07-13 ASSESSMENT — PAIN DESCRIPTION - DESCRIPTORS
DESCRIPTORS: SHARP;DISCOMFORT
DESCRIPTORS: SHARP;DISCOMFORT
DESCRIPTORS: SORE;DISCOMFORT

## 2023-07-13 ASSESSMENT — PAIN SCALES - GENERAL
PAINLEVEL_OUTOF10: 0
PAINLEVEL_OUTOF10: 10
PAINLEVEL_OUTOF10: 8
PAINLEVEL_OUTOF10: 10

## 2023-07-13 ASSESSMENT — PAIN DESCRIPTION - FREQUENCY
FREQUENCY: CONTINUOUS
FREQUENCY: CONTINUOUS

## 2023-07-13 ASSESSMENT — PAIN SCALES - WONG BAKER: WONGBAKER_NUMERICALRESPONSE: 0

## 2023-07-13 ASSESSMENT — PAIN DESCRIPTION - ONSET: ONSET: ON-GOING

## 2023-07-13 ASSESSMENT — PAIN DESCRIPTION - PAIN TYPE
TYPE: ACUTE PAIN
TYPE: ACUTE PAIN

## 2023-07-13 NOTE — PROGRESS NOTES
Occupational Therapy     Date:2023  Patient Name: Cathyann Lanes  MRN: [id number]  : 1955  Room: [address] [place] [address]     Completed chart review & attempted to see pt with pt declining therapy, only reason given is \"I don't feel good\". Pt would not open her eyes, only speaking a few words, appeared to just want to rest. Educated pt on importance of therapy due to pt wanting to go home with no results. Will attempt to see pt at another time. Jennifer Carreno.   [facility]

## 2023-07-13 NOTE — PROGRESS NOTES
24.  Her home medications included Lasix 20 mg p.o. daily, DDAVP 200 mcg twice a day. IMPRESSION/RECOMMENDATIONS:      Recurrent IRMA stage I, renal function continues to worsen, creatinine level up to 2.3 mg/dL, probably due to undocumented hypotension versus intravascular volume depletion. Renal function slightly improved, creatinine down to 2 mg/dl. 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   Hypokalemia, replaced  Secondary adrenal insufficiency, 2/2 sarcoidosis induced hypopituitarism, on prednisone 5 mg p.o. daily  HTN, on metoprolol and Lasix 40 mg daily, hydralazine on hold  HFpEF 60%, proBNP 4314 > 2375> 1206, on metoprolol and digoxin  ---------------------------------------------------------------  Pulmonary HTN  Multilobar pneumonia, on cefepime and vancomycin  PAF, on digoxin, metoprolol, amiodarone, and apixaban  Hypothyroidism, probably secondary, on levothyroxine  New onset seizures, on levetiracetam  Anemia, normocytic      Plan:     Continue NS at 75 cc/hour x24 hours   Continue to hold diuretics  Continue DDAVP 200 mcg p.o. twice daily  Continue prednisone 5 mg PO daily   Continue metoprolol succinate 100 mg daily  Continue to monitor sodium level  Continue to monitor renal function  Continue to monitor blood pressure       Electronically signed by Amos Smith MD on 7/13/2023 at 9:39 AM

## 2023-07-13 NOTE — CARE COORDINATION
Plan is home with  and [facility] when medically ready. Home health care orders are in. Ortho surgery, nephrology, cardiology and pulmonology are all following. Per Norma Hernandez from [facility], patient has a Bipap respiratory assist advice and 02 6 ltr with them. She is currently on O2 5 liters nasal cannula with O2 sat 95%. Patient for repeat CXR today. Await results and possible discharge. Last  PT/OT [facility] scorers are 12/24 & 15/24. Patient is adamant about not going to [facility] but remains agreeable to home health care. Therapy department notified of need for daily therapy TX's since plan is home. Patient's  will transport her home at time of discharge and will bring a portable O2 tank for the ride home.    Pawel Trevizo RN cm [phone]

## 2023-07-13 NOTE — PLAN OF CARE
Problem: Chronic Conditions and Co-morbidities  Goal: Patient's chronic conditions and co-morbidity symptoms are monitored and maintained or improved  7/12/2023 2048 by Savita Still RN  Outcome: Progressing  7/12/2023 1010 by Ryan Calvillo RN  Outcome: Progressing     Problem: Discharge Planning  Goal: Discharge to home or other facility with appropriate resources  7/12/2023 2048 by Savita Still RN  Outcome: Progressing  7/12/2023 1010 by Ryan Calvillo RN  Outcome: Progressing     Problem: Pain  Goal: Verbalizes/displays adequate comfort level or baseline comfort level  7/12/2023 2048 by Savita Still RN  Outcome: Progressing  7/12/2023 1010 by Ryan Calvillo RN  Outcome: Progressing     Problem: Safety - Adult  Goal: Free from fall injury  7/12/2023 2048 by Savita Still RN  Outcome: Progressing  7/12/2023 1010 by Ryan Calvillo RN  Outcome: Progressing     Problem: Skin/Tissue Integrity  Goal: Absence of new skin breakdown  Description: 1. Monitor for areas of redness and/or skin breakdown  2. Assess vascular access sites hourly  3. Every 4-6 hours minimum:  Change oxygen saturation probe site  4. Every 4-6 hours:  If on nasal continuous positive airway pressure, respiratory therapy assess nares and determine need for appliance change or resting period.   7/12/2023 2048 by Savita Still RN  Outcome: Progressing  7/12/2023 1010 by Ryan Calvillo RN  Outcome: Progressing     Problem: ABCDS Injury Assessment  Goal: Absence of physical injury  7/12/2023 2048 by Savita Still RN  Outcome: Progressing  7/12/2023 1010 by Ryan Calvillo RN  Outcome: Progressing     Problem: Nutrition Deficit:  Goal: Optimize nutritional status  Outcome: Progressing     Problem: Neurosensory - Adult  Goal: Achieves stable or improved neurological status  Outcome: Progressing  Goal: Absence of seizures  Outcome: Progressing  Goal: Achieves maximal functionality and self care  Outcome: Progressing     Problem: Respiratory -

## 2023-07-13 NOTE — PROGRESS NOTES
(68.2 kg) (7/13 BS), 120.3 % IBW. Weight Source: Bed Scale  Current BMI (kg/m2): 25  Usual Body Weight: 158 lb (71.7 kg) (bed 2/5/23 per EMR; BAYRON wt change properly d/t significant wt fluctuations pta (~140's-180's) likely 2/2 fluid shifts w/ CHF/CKD hx)  % Weight Change (Calculated): -3.2  Weight Adjustment For: No Adjustment                 BMI Categories: Normal Weight (BMI 18.5-24.9) (per adm wt)    Estimated Daily Nutrient Needs:  Energy Requirements Based On: Formula  Weight Used for Energy Requirements: Admission  Energy (kcal/day): MSJx1.3SF per Adm Wt=   Weight Used for Protein Requirements: Admission  Protein (g/day): 1.2-1.4gm/kg Adm Wt= 80-95  Method Used for Fluid Requirements: 1 ml/kcal  Fluid (ml/day):     Nutrition Diagnosis:   Moderate malnutrition, In context of chronic illness related to catabolic illness (2/2 COPD) as evidenced by Criteria as identified in malnutrition assessment    Nutrition Interventions:   Food and/or Nutrient Delivery: Continue Current Diet, Continue Oral Nutrition Supplement  Nutrition Education/Counseling: Education not indicated  Coordination of Nutrition Care: Continue to monitor while inpatient       Goals:  Previous Goal Met: Goal(s) Achieved  Goals: PO intake 75% or greater, by next RD assessment (to continue)       Nutrition Monitoring and Evaluation:   Behavioral-Environmental Outcomes: None Identified  Food/Nutrient Intake Outcomes: Food and Nutrient Intake, Supplement Intake  Physical Signs/Symptoms Outcomes: Biochemical Data, Chewing or Swallowing, GI Status, Fluid Status or Edema, Nutrition Focused Physical Findings, Skin, Weight    Discharge Planning:     Too soon to determine     Yolanda Colorado RD  Contact: ext [phone]

## 2023-07-13 NOTE — PROGRESS NOTES
Date: 7/12/2023    Time: 10:10 PM    Patient Placed On BIPAP/CPAP/ Non-Invasive Ventilation? Yes    If no must comment. Facial area red/color change? No           If YES are Blister/Lesion present? No   If yes must notify nursing staff  BIPAP/CPAP skin barrier?   Yes    Skin barrier type:mepilexlite       Comments:        Laurence Butler RCP

## 2023-07-13 NOTE — PROGRESS NOTES
Vancomycin has been discontinued   Clinical Pharmacy to sign-off  Physician to re-consult pharmacy if future dosing is needed    Thank you for the consult,  Sherie Carrillo PharmD, [facility], BCPS 7/13/2023 7:33 AM

## 2023-07-13 NOTE — PROGRESS NOTES
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 twice daily  Continue home levothyroxine  Continue prednisone 5 mg PO daily   Continue midodrine 2.5 mg p.o. 3 times daily  Lasix on hold     6. Hx R IJ thrombus  Continue Eliquis 5 mg BID   Hb currently stable at 9.2  Monitor for signs of bleeding    7. Left tibia fracture  Xray left tibia / fibula showing subacute healing fracture of the proximal left tibial shaft and areas of sclerosis in the lateral tibial epiphysis compatible with compression fracture  Oxycodone 5 mg as needed q12h, Robaxin added for further pain control  Concern that patient was too sedated from Robaxin, decreased dose to 750 mg QID PRN  Patient was in the past on percocet- was tapered off during previous ICU admission  Consider repeating dexa scan in a patient with chronic corticosteroids as outpatient  Ok to discharge from orthopedic surgery, old fracture, no need for surgical intervention at this time      PT 10/24 and OT 14/24  DVT/GI ppx: Eliquis/not indicated  Patient would benefit from facility at discharge with physical therapy for the left tibial fracture. If patient continues to refuse it, [address] with physical therapy.       Electronically signed by Jake Carrasco MD on 7/13/2023 at 3:19 PM  Attending physician: Dr. Flower Howard

## 2023-07-13 NOTE — PROGRESS NOTES
Started on digoxin 125mcg IV daily, will need transition to po, done 7/13. Eliquis. On DDAVP for DI , appreciate renal. Noted bump in creatinine to 2.3 for altagracia, urine studies ordered and prerenal. Will hold vanc (completed) and lasix. IVF. Creatinine is improving. Normocytic anemia-monitor hemoglobin    On keppra for seizure prophylaxis from the last admission    Xray of tibia showed fracture - ortho consulted. No plan for intervention. Opiates with caution for pain , Add robaxin for generalized pain    PT/OT, increase activity as tolerated  Outpt [facility] pulmonary htn follow up  Discharge planning to home with home health pending renal function  Hopefully in the next 1-2 days with creatinine improvement. Attending Physician Statement  I have reviewed the chart and seen the patient with the resident(s). I personally reviewed images, EKG's and similar tests, if present. I personally reviewed and performed key elements of the history and exam.  I have reviewed and confirmed student and/or resident history and exam with changes as indicated above. I agree with the assessment, plan and orders as documented by the resident. Please refer to the resident progress note today for additional information.       Tracy Kerns MD

## 2023-07-14 LAB
ALBUMIN SERPL-MCNC: 3.1 G/DL (ref 3.5–5.2)
ALP SERPL-CCNC: 67 U/L (ref 35–104)
ALT SERPL-CCNC: 9 U/L (ref 0–32)
ANION GAP SERPL CALCULATED.3IONS-SCNC: 13 MMOL/L (ref 7–16)
AST SERPL-CCNC: 17 U/L (ref 0–31)
BASOPHILIC STIPPLING: ABNORMAL
BASOPHILS # BLD: 0 E9/L (ref 0–0.2)
BASOPHILS NFR BLD: 0.2 % (ref 0–2)
BILIRUB SERPL-MCNC: 0.3 MG/DL (ref 0–1.2)
BUN SERPL-MCNC: 25 MG/DL (ref 6–23)
CALCIUM SERPL-MCNC: 8.7 MG/DL (ref 8.6–10.2)
CHLORIDE SERPL-SCNC: 102 MMOL/L (ref 98–107)
CO2 SERPL-SCNC: 26 MMOL/L (ref 22–29)
CREAT SERPL-MCNC: 2 MG/DL (ref 0.5–1)
EOSINOPHIL # BLD: 0 E9/L (ref 0.05–0.5)
EOSINOPHIL NFR BLD: 3.3 % (ref 0–6)
ERYTHROCYTE [DISTWIDTH] IN BLOOD BY AUTOMATED COUNT: 14 FL (ref 11.5–15)
GLUCOSE SERPL-MCNC: 74 MG/DL (ref 74–99)
HCT VFR BLD AUTO: 27.2 % (ref 34–48)
HGB BLD-MCNC: 8 G/DL (ref 11.5–15.5)
HYPOCHROMIA: ABNORMAL
LYMPHOCYTES # BLD: 0.47 E9/L (ref 1.5–4)
LYMPHOCYTES NFR BLD: 11.4 % (ref 20–42)
MCH RBC QN AUTO: 26.7 PG (ref 26–35)
MCHC RBC AUTO-ENTMCNC: 29.4 % (ref 32–34.5)
MCV RBC AUTO: 90.7 FL (ref 80–99.9)
MONOCYTES # BLD: 0.26 E9/L (ref 0.1–0.95)
MONOCYTES NFR BLD: 6.1 % (ref 2–12)
NEUTROPHILS # BLD: 3.57 E9/L (ref 1.8–7.3)
NEUTS SEG NFR BLD: 82.5 % (ref 43–80)
NRBC BLD-RTO: 0.9 /100 WBC
PLATELET # BLD AUTO: 192 E9/L (ref 130–450)
PMV BLD AUTO: 11.5 FL (ref 7–12)
POIKILOCYTES: ABNORMAL
POLYCHROMASIA: ABNORMAL
POTASSIUM SERPL-SCNC: 3.8 MMOL/L (ref 3.5–5)
PROT SERPL-MCNC: 5.9 G/DL (ref 6.4–8.3)
RBC # BLD AUTO: 3 E12/L (ref 3.5–5.5)
SCHISTOCYTES: ABNORMAL
SODIUM SERPL-SCNC: 141 MMOL/L (ref 132–146)
TARGET CELLS: ABNORMAL
WBC # BLD: 4.3 E9/L (ref 4.5–11.5)

## 2023-07-14 PROCEDURE — 6360000002 HC RX W HCPCS: Performed by: INTERNAL MEDICINE

## 2023-07-14 PROCEDURE — 85025 COMPLETE CBC W/AUTO DIFF WBC: CPT

## 2023-07-14 PROCEDURE — 6370000000 HC RX 637 (ALT 250 FOR IP)

## 2023-07-14 PROCEDURE — 94660 CPAP INITIATION&MGMT: CPT

## 2023-07-14 PROCEDURE — 6370000000 HC RX 637 (ALT 250 FOR IP): Performed by: INTERNAL MEDICINE

## 2023-07-14 PROCEDURE — 94640 AIRWAY INHALATION TREATMENT: CPT

## 2023-07-14 PROCEDURE — 94669 MECHANICAL CHEST WALL OSCILL: CPT

## 2023-07-14 PROCEDURE — 36415 COLL VENOUS BLD VENIPUNCTURE: CPT

## 2023-07-14 PROCEDURE — 99232 SBSQ HOSP IP/OBS MODERATE 35: CPT | Performed by: FAMILY MEDICINE

## 2023-07-14 PROCEDURE — 2580000003 HC RX 258

## 2023-07-14 PROCEDURE — 2700000000 HC OXYGEN THERAPY PER DAY

## 2023-07-14 PROCEDURE — 1200000000 HC SEMI PRIVATE

## 2023-07-14 PROCEDURE — 80053 COMPREHEN METABOLIC PANEL: CPT

## 2023-07-14 RX ADMIN — IPRATROPIUM BROMIDE AND ALBUTEROL SULFATE 1 DOSE: .5; 2.5 SOLUTION RESPIRATORY (INHALATION) at 20:29

## 2023-07-14 RX ADMIN — BUDESONIDE 500 MCG: 0.5 SUSPENSION RESPIRATORY (INHALATION) at 11:17

## 2023-07-14 RX ADMIN — METOPROLOL SUCCINATE 25 MG: 25 TABLET, EXTENDED RELEASE ORAL at 09:07

## 2023-07-14 RX ADMIN — APIXABAN 5 MG: 5 TABLET, FILM COATED ORAL at 22:38

## 2023-07-14 RX ADMIN — PREDNISONE 5 MG: 5 TABLET ORAL at 09:07

## 2023-07-14 RX ADMIN — DIGOXIN 125 MCG: 125 TABLET ORAL at 14:49

## 2023-07-14 RX ADMIN — ARFORMOTEROL TARTRATE 15 MCG: 15 SOLUTION RESPIRATORY (INHALATION) at 20:28

## 2023-07-14 RX ADMIN — BUDESONIDE 500 MCG: 0.5 SUSPENSION RESPIRATORY (INHALATION) at 20:28

## 2023-07-14 RX ADMIN — METHOCARBAMOL 750 MG: 750 TABLET ORAL at 22:38

## 2023-07-14 RX ADMIN — OXYCODONE HYDROCHLORIDE 5 MG: 5 TABLET ORAL at 17:24

## 2023-07-14 RX ADMIN — AMIODARONE HYDROCHLORIDE 200 MG: 200 TABLET ORAL at 09:07

## 2023-07-14 RX ADMIN — DULOXETINE 60 MG: 60 CAPSULE, DELAYED RELEASE ORAL at 09:07

## 2023-07-14 RX ADMIN — LEVETIRACETAM 500 MG: 500 TABLET, FILM COATED ORAL at 22:38

## 2023-07-14 RX ADMIN — MIDODRINE HYDROCHLORIDE 2.5 MG: 5 TABLET ORAL at 17:24

## 2023-07-14 RX ADMIN — LEVETIRACETAM 500 MG: 500 TABLET, FILM COATED ORAL at 09:07

## 2023-07-14 RX ADMIN — LEVOTHYROXINE SODIUM 75 MCG: 0.07 TABLET ORAL at 09:07

## 2023-07-14 RX ADMIN — SODIUM CHLORIDE, PRESERVATIVE FREE 10 ML: 5 INJECTION INTRAVENOUS at 22:40

## 2023-07-14 RX ADMIN — METOPROLOL SUCCINATE 25 MG: 25 TABLET, EXTENDED RELEASE ORAL at 22:38

## 2023-07-14 RX ADMIN — IPRATROPIUM BROMIDE AND ALBUTEROL SULFATE 1 DOSE: .5; 2.5 SOLUTION RESPIRATORY (INHALATION) at 11:16

## 2023-07-14 RX ADMIN — IPRATROPIUM BROMIDE AND ALBUTEROL SULFATE 1 DOSE: .5; 2.5 SOLUTION RESPIRATORY (INHALATION) at 15:10

## 2023-07-14 RX ADMIN — APIXABAN 5 MG: 5 TABLET, FILM COATED ORAL at 09:07

## 2023-07-14 RX ADMIN — DESMOPRESSIN ACETATE 200 MCG: 0.1 TABLET ORAL at 09:07

## 2023-07-14 RX ADMIN — ARFORMOTEROL TARTRATE 15 MCG: 15 SOLUTION RESPIRATORY (INHALATION) at 11:15

## 2023-07-14 RX ADMIN — DESMOPRESSIN ACETATE 200 MCG: 0.1 TABLET ORAL at 22:40

## 2023-07-14 RX ADMIN — ACETAMINOPHEN 650 MG: 325 TABLET ORAL at 22:38

## 2023-07-14 ASSESSMENT — PAIN DESCRIPTION - DESCRIPTORS
DESCRIPTORS: DISCOMFORT;SHARP
DESCRIPTORS: CRUSHING;DISCOMFORT

## 2023-07-14 ASSESSMENT — PAIN - FUNCTIONAL ASSESSMENT: PAIN_FUNCTIONAL_ASSESSMENT: ACTIVITIES ARE NOT PREVENTED

## 2023-07-14 ASSESSMENT — PAIN SCALES - GENERAL
PAINLEVEL_OUTOF10: 9
PAINLEVEL_OUTOF10: 0
PAINLEVEL_OUTOF10: 9

## 2023-07-14 ASSESSMENT — PAIN DESCRIPTION - LOCATION
LOCATION: SHOULDER
LOCATION: LEG;SHOULDER

## 2023-07-14 ASSESSMENT — PAIN DESCRIPTION - FREQUENCY: FREQUENCY: CONTINUOUS

## 2023-07-14 ASSESSMENT — PAIN SCALES - WONG BAKER: WONGBAKER_NUMERICALRESPONSE: 0

## 2023-07-14 ASSESSMENT — PAIN DESCRIPTION - ONSET: ONSET: ON-GOING

## 2023-07-14 ASSESSMENT — PAIN DESCRIPTION - ORIENTATION: ORIENTATION: RIGHT

## 2023-07-14 NOTE — PROGRESS NOTES
Date: 7/13/2023    Time: 11:15 PM    Patient Placed On BIPAP/CPAP/ Non-Invasive Ventilation? Yes    If no must comment. Facial area red/color change? No           If YES are Blister/Lesion present? No   If yes must notify nursing staff  BIPAP/CPAP skin barrier?   Yes    Skin barrier type:mepilexlite       Comments:        Eric Livingston RCP

## 2023-07-14 NOTE — PROGRESS NOTES
Enlargement of the main pulmonary artery up to 4.2 cm. This is nonspecific   but can be seen with pulmonary artery hypertension. Multiple compression fracture deformities of the thoracic spine including an   age-indeterminate but subacute to chronic appearing mild anterior compression   fracture at T5 and moderate anterior compression fracture deformity at T7,   not significantly changed in appearance compared with June 14, 2023. XR CHEST PORTABLE   Final Result   Mild likely bibasilar atelectasis. Resident Assessment and Plan       Acute hypoxic respiratory failure 2/2 multifocal pneumonia  Patient stable from respiratory standpoint- currently saturating appropriately on home dose oxygen  +MRSA nares- mupirocin ointment for 7 days  Patient tolerating nasal cannula well, at home oxygen, maintain O2 sat >88%, pulmonology following- appreciate recommendations  Patient will need further workup for pulmonary hypertension at [facility]    2. Hx atrial fibrillation, combined systolic/diastolic heart failure  Cardiology consulted- amiodarone, digoxin, apixaban and metoprolol succinate per their recommendations  Was originally on 40 mg Lasix IV- on hold d/t IRMA  Continue to hold antihypertensive medications including hydralazine, amlodipine  Patient stable from the cardiology standpoint    3. Acute kidney injury  Patient's creatinine still 2.0, same as yesterday  FeUrea is 26% > likely pre-renal  Vancomycin and Lasix on hold d/t IRMA  Nephrology following- started fluids 75 ml/hr  Will continue to monitor renal function, appreciate nephrology recommendations     4. Hx seizures  No seizure activity noted  Continue home Keppra     5.  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

## 2023-07-14 NOTE — CARE COORDINATION
Plan is home with  and [facility] when medically ready. Home health care orders are in. Ortho surgery, nephrology, cardiology and pulmonology are all following. Per Maricruz Overton from [facility], patient has a Bipap respiratory assist advice and 02 6 ltr with them. She is currently on O2 6 liters high flow nasal cannula with O2 sat 99%. Last  PT/OT [facility] scorers are 12/24 & 15/24. Patient is adamant about not going to [facility] but remains agreeable to home health care. Therapy department notified of need for daily therapy TX's since plan is home. Patient's  will transport her home at time of discharge and will bring a portable O2 tank for the ride home.   Lindsay Rock RN CM  [phone]

## 2023-07-14 NOTE — PLAN OF CARE
Problem: Chronic Conditions and Co-morbidities  Goal: Patient's chronic conditions and co-morbidity symptoms are monitored and maintained or improved  Outcome: Progressing     Problem: Discharge Planning  Goal: Discharge to home or other facility with appropriate resources  Outcome: Progressing     Problem: Pain  Goal: Verbalizes/displays adequate comfort level or baseline comfort level  Outcome: Progressing     Problem: Safety - Adult  Goal: Free from fall injury  Outcome: Progressing     Problem: Skin/Tissue Integrity  Goal: Absence of new skin breakdown  Description: 1. Monitor for areas of redness and/or skin breakdown  2. Assess vascular access sites hourly  3. Every 4-6 hours minimum:  Change oxygen saturation probe site  4. Every 4-6 hours:  If on nasal continuous positive airway pressure, respiratory therapy assess nares and determine need for appliance change or resting period.   Outcome: Progressing     Problem: ABCDS Injury Assessment  Goal: Absence of physical injury  Outcome: Progressing     Problem: Neurosensory - Adult  Goal: Achieves stable or improved neurological status  Outcome: Progressing     Problem: Neurosensory - Adult  Goal: Achieves maximal functionality and self care  Outcome: Progressing     Problem: Neurosensory - Adult  Goal: Absence of seizures  Outcome: Progressing     Problem: Cardiovascular - Adult  Goal: Maintains optimal cardiac output and hemodynamic stability  Outcome: Progressing     Problem: Musculoskeletal - Adult  Goal: Maintain proper alignment of affected body part  Outcome: Progressing     Problem: Musculoskeletal - Adult  Goal: Return ADL status to a safe level of function  Outcome: Progressing     Problem: Infection - Adult  Goal: Absence of infection at discharge  Outcome: Progressing

## 2023-07-15 LAB
ALBUMIN SERPL-MCNC: 3.3 G/DL (ref 3.5–5.2)
ALP SERPL-CCNC: 78 U/L (ref 35–104)
ALT SERPL-CCNC: 11 U/L (ref 0–32)
ANION GAP SERPL CALCULATED.3IONS-SCNC: 10 MMOL/L (ref 7–16)
AST SERPL-CCNC: 23 U/L (ref 0–31)
BASOPHILS # BLD: 0.05 K/UL (ref 0–0.2)
BASOPHILS NFR BLD: 1 % (ref 0–2)
BILIRUB SERPL-MCNC: 0.2 MG/DL (ref 0–1.2)
BUN SERPL-MCNC: 24 MG/DL (ref 6–23)
CALCIUM SERPL-MCNC: 9.2 MG/DL (ref 8.6–10.2)
CHLORIDE SERPL-SCNC: 98 MMOL/L (ref 98–107)
CO2 SERPL-SCNC: 29 MMOL/L (ref 22–29)
CREAT SERPL-MCNC: 1.8 MG/DL (ref 0.5–1)
CREAT UR-MCNC: 60.6 MG/DL (ref 29–226)
EOSINOPHIL # BLD: 0.11 K/UL (ref 0.05–0.5)
EOSINOPHILS RELATIVE PERCENT: 2 % (ref 0–6)
ERYTHROCYTE [DISTWIDTH] IN BLOOD BY AUTOMATED COUNT: 13.8 % (ref 11.5–15)
GFR SERPL CREATININE-BSD FRML MDRD: 31 ML/MIN/1.73M2
GLUCOSE SERPL-MCNC: 98 MG/DL (ref 74–99)
HCT VFR BLD AUTO: 28.1 % (ref 34–48)
HGB BLD-MCNC: 8.3 G/DL (ref 11.5–15.5)
LYMPHOCYTES # BLD: 3 % (ref 20–42)
LYMPHOCYTES NFR BLD: 0.16 K/UL (ref 1.5–4)
MAGNESIUM SERPL-MCNC: 1.9 MG/DL (ref 1.6–2.6)
MCH RBC QN AUTO: 26.2 PG (ref 26–35)
MCHC RBC AUTO-ENTMCNC: 29.5 G/DL (ref 32–34.5)
MCV RBC AUTO: 88.6 FL (ref 80–99.9)
MONOCYTES NFR BLD: 0.38 K/UL (ref 0.1–0.95)
MONOCYTES NFR BLD: 6 % (ref 2–12)
MYELOCYTES ABSOLUTE COUNT: 0.05 K/UL
MYELOCYTES: 1 %
NEUTROPHILS NFR BLD: 88 % (ref 43–80)
NEUTS SEG NFR BLD: 5.45 K/UL (ref 1.8–7.3)
OSMOLALITY UR: 438 MOSM/KG (ref 300–900)
PLATELET # BLD AUTO: 197 K/UL (ref 130–450)
PMV BLD AUTO: 12.3 FL (ref 7–12)
POTASSIUM SERPL-SCNC: 3.4 MMOL/L (ref 3.5–5)
PROT SERPL-MCNC: 6.4 G/DL (ref 6.4–8.3)
RBC # BLD AUTO: 3.17 M/UL (ref 3.5–5.5)
RBC # BLD: ABNORMAL 10*6/UL
RBC # BLD: ABNORMAL 10*6/UL
SODIUM SERPL-SCNC: 137 MMOL/L (ref 132–146)
UUN UR-MCNC: 364 MG/DL (ref 800–1666)
WBC OTHER # BLD: 6.2 K/UL (ref 4.5–11.5)

## 2023-07-15 PROCEDURE — 6360000002 HC RX W HCPCS: Performed by: INTERNAL MEDICINE

## 2023-07-15 PROCEDURE — 83735 ASSAY OF MAGNESIUM: CPT

## 2023-07-15 PROCEDURE — 6370000000 HC RX 637 (ALT 250 FOR IP): Performed by: INTERNAL MEDICINE

## 2023-07-15 PROCEDURE — 36415 COLL VENOUS BLD VENIPUNCTURE: CPT

## 2023-07-15 PROCEDURE — 82570 ASSAY OF URINE CREATININE: CPT

## 2023-07-15 PROCEDURE — 94640 AIRWAY INHALATION TREATMENT: CPT

## 2023-07-15 PROCEDURE — 2700000000 HC OXYGEN THERAPY PER DAY

## 2023-07-15 PROCEDURE — 83935 ASSAY OF URINE OSMOLALITY: CPT

## 2023-07-15 PROCEDURE — 85027 COMPLETE CBC AUTOMATED: CPT

## 2023-07-15 PROCEDURE — 80053 COMPREHEN METABOLIC PANEL: CPT

## 2023-07-15 PROCEDURE — 6370000000 HC RX 637 (ALT 250 FOR IP)

## 2023-07-15 PROCEDURE — 2580000003 HC RX 258

## 2023-07-15 PROCEDURE — 84540 ASSAY OF URINE/UREA-N: CPT

## 2023-07-15 PROCEDURE — 94660 CPAP INITIATION&MGMT: CPT

## 2023-07-15 PROCEDURE — 1200000000 HC SEMI PRIVATE

## 2023-07-15 PROCEDURE — 99232 SBSQ HOSP IP/OBS MODERATE 35: CPT | Performed by: FAMILY MEDICINE

## 2023-07-15 RX ORDER — POTASSIUM CHLORIDE 20 MEQ/1
20 TABLET, EXTENDED RELEASE ORAL ONCE
Status: COMPLETED | OUTPATIENT
Start: 2023-07-15 | End: 2023-07-15

## 2023-07-15 RX ADMIN — POTASSIUM CHLORIDE 20 MEQ: 1500 TABLET, EXTENDED RELEASE ORAL at 14:58

## 2023-07-15 RX ADMIN — DESMOPRESSIN ACETATE 200 MCG: 0.1 TABLET ORAL at 22:17

## 2023-07-15 RX ADMIN — IPRATROPIUM BROMIDE AND ALBUTEROL SULFATE 1 DOSE: .5; 2.5 SOLUTION RESPIRATORY (INHALATION) at 11:48

## 2023-07-15 RX ADMIN — APIXABAN 5 MG: 5 TABLET, FILM COATED ORAL at 22:16

## 2023-07-15 RX ADMIN — METOPROLOL SUCCINATE 25 MG: 25 TABLET, EXTENDED RELEASE ORAL at 22:16

## 2023-07-15 RX ADMIN — LEVETIRACETAM 500 MG: 500 TABLET, FILM COATED ORAL at 22:16

## 2023-07-15 RX ADMIN — AMIODARONE HYDROCHLORIDE 200 MG: 200 TABLET ORAL at 08:38

## 2023-07-15 RX ADMIN — ACETAMINOPHEN 650 MG: 325 TABLET ORAL at 16:58

## 2023-07-15 RX ADMIN — OXYCODONE HYDROCHLORIDE 5 MG: 5 TABLET ORAL at 05:47

## 2023-07-15 RX ADMIN — BUDESONIDE 500 MCG: 0.5 SUSPENSION RESPIRATORY (INHALATION) at 19:44

## 2023-07-15 RX ADMIN — IPRATROPIUM BROMIDE AND ALBUTEROL SULFATE 1 DOSE: .5; 2.5 SOLUTION RESPIRATORY (INHALATION) at 08:11

## 2023-07-15 RX ADMIN — SODIUM CHLORIDE, PRESERVATIVE FREE 10 ML: 5 INJECTION INTRAVENOUS at 08:38

## 2023-07-15 RX ADMIN — BUDESONIDE 500 MCG: 0.5 SUSPENSION RESPIRATORY (INHALATION) at 08:11

## 2023-07-15 RX ADMIN — METHOCARBAMOL 750 MG: 750 TABLET ORAL at 16:58

## 2023-07-15 RX ADMIN — METOPROLOL SUCCINATE 25 MG: 25 TABLET, EXTENDED RELEASE ORAL at 08:37

## 2023-07-15 RX ADMIN — ARFORMOTEROL TARTRATE 15 MCG: 15 SOLUTION RESPIRATORY (INHALATION) at 19:44

## 2023-07-15 RX ADMIN — SODIUM CHLORIDE, PRESERVATIVE FREE 10 ML: 5 INJECTION INTRAVENOUS at 22:18

## 2023-07-15 RX ADMIN — LEVOTHYROXINE SODIUM 75 MCG: 0.07 TABLET ORAL at 08:37

## 2023-07-15 RX ADMIN — APIXABAN 5 MG: 5 TABLET, FILM COATED ORAL at 08:37

## 2023-07-15 RX ADMIN — DULOXETINE 60 MG: 60 CAPSULE, DELAYED RELEASE ORAL at 08:37

## 2023-07-15 RX ADMIN — OXYCODONE HYDROCHLORIDE 5 MG: 5 TABLET ORAL at 18:08

## 2023-07-15 RX ADMIN — LEVETIRACETAM 500 MG: 500 TABLET, FILM COATED ORAL at 08:37

## 2023-07-15 RX ADMIN — ARFORMOTEROL TARTRATE 15 MCG: 15 SOLUTION RESPIRATORY (INHALATION) at 08:11

## 2023-07-15 RX ADMIN — PREDNISONE 5 MG: 5 TABLET ORAL at 08:37

## 2023-07-15 RX ADMIN — DIGOXIN 125 MCG: 125 TABLET ORAL at 08:37

## 2023-07-15 RX ADMIN — DESMOPRESSIN ACETATE 200 MCG: 0.1 TABLET ORAL at 08:38

## 2023-07-15 RX ADMIN — IPRATROPIUM BROMIDE AND ALBUTEROL SULFATE 1 DOSE: .5; 2.5 SOLUTION RESPIRATORY (INHALATION) at 19:44

## 2023-07-15 ASSESSMENT — PAIN DESCRIPTION - DESCRIPTORS: DESCRIPTORS: SORE;SHARP

## 2023-07-15 ASSESSMENT — PAIN SCALES - GENERAL
PAINLEVEL_OUTOF10: 10
PAINLEVEL_OUTOF10: 5
PAINLEVEL_OUTOF10: 9
PAINLEVEL_OUTOF10: 8

## 2023-07-15 ASSESSMENT — PAIN DESCRIPTION - LOCATION
LOCATION: MOUTH
LOCATION: HEAD;SHOULDER

## 2023-07-15 ASSESSMENT — PAIN SCALES - WONG BAKER: WONGBAKER_NUMERICALRESPONSE: 6

## 2023-07-15 NOTE — PROGRESS NOTES
07/14/23 2028   NIV Type   Equipment Changed NIV mask/interface   NIV Started/Stopped On   Equipment Type v60   Mode AVAPS   Mask Type Full face mask   Mask Size Small   Assessment   Pulse 80   Respirations 20   SpO2 97 %   Skin Assessment Clean, dry, & intact   Skin Protection for O2 Device Yes   Orientation Middle   Location Nose   Intervention(s) Skin Barrier   Breath Sounds   Right Upper Lobe Diminished   Right Middle Lobe Diminished   Right Lower Lobe Diminished   Left Upper Lobe Diminished   Left Lower Lobe Diminished   Settings/Measurements   PIP Observed 21 cm H20   CPAP/EPAP 8 cmH2O   IPAP Min 12 cmH2O   IPAP Max 30 cmH2O   Vt (Set, mL) 500 mL   Vt (Measured) 476 mL   Rate Ordered 16   Insp Rise Time (%) 3 %   O2 Flow Rate (L/min) 5 L/min   FiO2  (S)  60 %   I Time/ I Time % 0.9 s   Minute Volume (L/min) 7.5 Liters   Mask Leak (lpm) 41 lpm   Patient's Home Machine No   Alarm Settings   Alarms On Y   High Pressure (cmH2O) 35 cmH2O   Apnea (secs) 20 secs   RR High (bpm) (S)  35 br/min   Oxygen Therapy/Pulse Ox   O2 Therapy Oxygen   O2 Device High flow nasal cannula   Pulse Oximeter Device Mode Continuous   Pulse Oximeter Device Location Finger     Date: 7/14/2023    Time: 9:26 PM    Patient Placed On BIPAP/CPAP/ Non-Invasive Ventilation? Yes    If no must comment. Facial area red/color change? No           If YES are Blister/Lesion present? No   If yes must notify nursing staff  BIPAP/CPAP skin barrier?   Yes    Skin barrier type:mepilexlite       Comments:        Yaima Tatum RCP

## 2023-07-15 NOTE — PLAN OF CARE
Problem: Chronic Conditions and Co-morbidities  Goal: Patient's chronic conditions and co-morbidity symptoms are monitored and maintained or improved  Outcome: Progressing     Problem: Discharge Planning  Goal: Discharge to home or other facility with appropriate resources  Outcome: Progressing     Problem: Pain  Goal: Verbalizes/displays adequate comfort level or baseline comfort level  Outcome: Progressing     Problem: Safety - Adult  Goal: Free from fall injury  Outcome: Progressing     Problem: Skin/Tissue Integrity  Goal: Absence of new skin breakdown  Description: 1. Monitor for areas of redness and/or skin breakdown  2. Assess vascular access sites hourly  3. Every 4-6 hours minimum:  Change oxygen saturation probe site  4. Every 4-6 hours:  If on nasal continuous positive airway pressure, respiratory therapy assess nares and determine need for appliance change or resting period.   Outcome: Progressing     Problem: ABCDS Injury Assessment  Goal: Absence of physical injury  Outcome: Progressing     Problem: Neurosensory - Adult  Goal: Achieves stable or improved neurological status  Outcome: Progressing  Goal: Absence of seizures  Outcome: Progressing  Goal: Achieves maximal functionality and self care  Outcome: Progressing     Problem: Respiratory - Adult  Goal: Achieves optimal ventilation and oxygenation  Outcome: Progressing     Problem: Cardiovascular - Adult  Goal: Maintains optimal cardiac output and hemodynamic stability  Outcome: Progressing  Goal: Absence of cardiac dysrhythmias or at baseline  Outcome: Progressing     Problem: Skin/Tissue Integrity - Adult  Goal: Skin integrity remains intact  Outcome: Progressing     Problem: Musculoskeletal - Adult  Goal: Return mobility to safest level of function  Outcome: Progressing

## 2023-07-16 ENCOUNTER — APPOINTMENT (OUTPATIENT)
Dept: GENERAL RADIOLOGY | Age: 68
End: 2023-07-16
Payer: MEDICARE

## 2023-07-16 LAB
ALBUMIN SERPL-MCNC: 3.4 G/DL (ref 3.5–5.2)
ALP SERPL-CCNC: 78 U/L (ref 35–104)
ALT SERPL-CCNC: 11 U/L (ref 0–32)
ANION GAP SERPL CALCULATED.3IONS-SCNC: 12 MMOL/L (ref 7–16)
AST SERPL-CCNC: 22 U/L (ref 0–31)
BASOPHILS # BLD: 0.02 K/UL (ref 0–0.2)
BASOPHILS NFR BLD: 0 % (ref 0–2)
BILIRUB SERPL-MCNC: 0.4 MG/DL (ref 0–1.2)
BUN SERPL-MCNC: 17 MG/DL (ref 6–23)
CALCIUM SERPL-MCNC: 9.5 MG/DL (ref 8.6–10.2)
CHLORIDE SERPL-SCNC: 100 MMOL/L (ref 98–107)
CO2 SERPL-SCNC: 28 MMOL/L (ref 22–29)
CREAT SERPL-MCNC: 1.3 MG/DL (ref 0.5–1)
EOSINOPHIL # BLD: 0.14 K/UL (ref 0.05–0.5)
EOSINOPHILS RELATIVE PERCENT: 2 % (ref 0–6)
ERYTHROCYTE [DISTWIDTH] IN BLOOD BY AUTOMATED COUNT: 13.9 % (ref 11.5–15)
GFR SERPL CREATININE-BSD FRML MDRD: 45 ML/MIN/1.73M2
GLUCOSE SERPL-MCNC: 64 MG/DL (ref 74–99)
HCT VFR BLD AUTO: 29.1 % (ref 34–48)
HGB BLD-MCNC: 8.8 G/DL (ref 11.5–15.5)
IMM GRANULOCYTES # BLD AUTO: 0.06 K/UL (ref 0–0.58)
IMM GRANULOCYTES NFR BLD: 1 % (ref 0–5)
LYMPHOCYTES # BLD: 7 % (ref 20–42)
LYMPHOCYTES NFR BLD: 0.47 K/UL (ref 1.5–4)
MCH RBC QN AUTO: 26.3 PG (ref 26–35)
MCHC RBC AUTO-ENTMCNC: 30.2 G/DL (ref 32–34.5)
MCV RBC AUTO: 86.9 FL (ref 80–99.9)
MONOCYTES NFR BLD: 0.59 K/UL (ref 0.1–0.95)
MONOCYTES NFR BLD: 9 % (ref 2–12)
NEUTROPHILS NFR BLD: 81 % (ref 43–80)
NEUTS SEG NFR BLD: 5.3 K/UL (ref 1.8–7.3)
PLATELET # BLD AUTO: 202 K/UL (ref 130–450)
PMV BLD AUTO: 12 FL (ref 7–12)
POTASSIUM SERPL-SCNC: 3.8 MMOL/L (ref 3.5–5)
PROT SERPL-MCNC: 6.3 G/DL (ref 6.4–8.3)
RBC # BLD AUTO: 3.35 M/UL (ref 3.5–5.5)
RBC # BLD: ABNORMAL 10*6/UL
SODIUM SERPL-SCNC: 140 MMOL/L (ref 132–146)
TROPONIN I SERPL HS-MCNC: 32 NG/L (ref 0–9)
TROPONIN I SERPL HS-MCNC: 33 NG/L (ref 0–9)
WBC OTHER # BLD: 6.6 K/UL (ref 4.5–11.5)

## 2023-07-16 PROCEDURE — 94640 AIRWAY INHALATION TREATMENT: CPT

## 2023-07-16 PROCEDURE — 6370000000 HC RX 637 (ALT 250 FOR IP)

## 2023-07-16 PROCEDURE — 6370000000 HC RX 637 (ALT 250 FOR IP): Performed by: INTERNAL MEDICINE

## 2023-07-16 PROCEDURE — 6360000002 HC RX W HCPCS: Performed by: INTERNAL MEDICINE

## 2023-07-16 PROCEDURE — 71045 X-RAY EXAM CHEST 1 VIEW: CPT

## 2023-07-16 PROCEDURE — 85027 COMPLETE CBC AUTOMATED: CPT

## 2023-07-16 PROCEDURE — 94660 CPAP INITIATION&MGMT: CPT

## 2023-07-16 PROCEDURE — 99232 SBSQ HOSP IP/OBS MODERATE 35: CPT | Performed by: FAMILY MEDICINE

## 2023-07-16 PROCEDURE — 1200000000 HC SEMI PRIVATE

## 2023-07-16 PROCEDURE — 2580000003 HC RX 258

## 2023-07-16 PROCEDURE — 2700000000 HC OXYGEN THERAPY PER DAY

## 2023-07-16 PROCEDURE — 80053 COMPREHEN METABOLIC PANEL: CPT

## 2023-07-16 PROCEDURE — 84484 ASSAY OF TROPONIN QUANT: CPT

## 2023-07-16 PROCEDURE — 36415 COLL VENOUS BLD VENIPUNCTURE: CPT

## 2023-07-16 PROCEDURE — 93005 ELECTROCARDIOGRAM TRACING: CPT

## 2023-07-16 RX ADMIN — IPRATROPIUM BROMIDE AND ALBUTEROL SULFATE 1 DOSE: .5; 2.5 SOLUTION RESPIRATORY (INHALATION) at 11:13

## 2023-07-16 RX ADMIN — DESMOPRESSIN ACETATE 200 MCG: 0.1 TABLET ORAL at 20:43

## 2023-07-16 RX ADMIN — IPRATROPIUM BROMIDE AND ALBUTEROL SULFATE 1 DOSE: .5; 2.5 SOLUTION RESPIRATORY (INHALATION) at 15:46

## 2023-07-16 RX ADMIN — DESMOPRESSIN ACETATE 200 MCG: 0.1 TABLET ORAL at 08:34

## 2023-07-16 RX ADMIN — LEVETIRACETAM 500 MG: 500 TABLET, FILM COATED ORAL at 08:35

## 2023-07-16 RX ADMIN — OXYCODONE HYDROCHLORIDE 5 MG: 5 TABLET ORAL at 08:36

## 2023-07-16 RX ADMIN — BUDESONIDE 500 MCG: 0.5 SUSPENSION RESPIRATORY (INHALATION) at 21:09

## 2023-07-16 RX ADMIN — PREDNISONE 5 MG: 5 TABLET ORAL at 08:36

## 2023-07-16 RX ADMIN — SODIUM CHLORIDE, PRESERVATIVE FREE 10 ML: 5 INJECTION INTRAVENOUS at 08:36

## 2023-07-16 RX ADMIN — LEVETIRACETAM 500 MG: 500 TABLET, FILM COATED ORAL at 20:34

## 2023-07-16 RX ADMIN — ARFORMOTEROL TARTRATE 15 MCG: 15 SOLUTION RESPIRATORY (INHALATION) at 08:00

## 2023-07-16 RX ADMIN — LEVOTHYROXINE SODIUM 75 MCG: 0.07 TABLET ORAL at 08:34

## 2023-07-16 RX ADMIN — ARFORMOTEROL TARTRATE 15 MCG: 15 SOLUTION RESPIRATORY (INHALATION) at 21:09

## 2023-07-16 RX ADMIN — SODIUM CHLORIDE, PRESERVATIVE FREE 10 ML: 5 INJECTION INTRAVENOUS at 20:40

## 2023-07-16 RX ADMIN — DIGOXIN 125 MCG: 125 TABLET ORAL at 08:36

## 2023-07-16 RX ADMIN — IPRATROPIUM BROMIDE AND ALBUTEROL SULFATE 1 DOSE: .5; 2.5 SOLUTION RESPIRATORY (INHALATION) at 08:00

## 2023-07-16 RX ADMIN — BUDESONIDE 500 MCG: 0.5 SUSPENSION RESPIRATORY (INHALATION) at 08:00

## 2023-07-16 RX ADMIN — METHOCARBAMOL 750 MG: 750 TABLET ORAL at 20:38

## 2023-07-16 RX ADMIN — APIXABAN 5 MG: 5 TABLET, FILM COATED ORAL at 08:36

## 2023-07-16 RX ADMIN — GUAIFENESIN 400 MG: 400 TABLET ORAL at 20:34

## 2023-07-16 RX ADMIN — AMIODARONE HYDROCHLORIDE 200 MG: 200 TABLET ORAL at 08:35

## 2023-07-16 RX ADMIN — IPRATROPIUM BROMIDE AND ALBUTEROL SULFATE 1 DOSE: .5; 2.5 SOLUTION RESPIRATORY (INHALATION) at 21:09

## 2023-07-16 RX ADMIN — DULOXETINE 60 MG: 60 CAPSULE, DELAYED RELEASE ORAL at 08:34

## 2023-07-16 RX ADMIN — APIXABAN 5 MG: 5 TABLET, FILM COATED ORAL at 20:38

## 2023-07-16 RX ADMIN — METOPROLOL SUCCINATE 25 MG: 25 TABLET, EXTENDED RELEASE ORAL at 08:35

## 2023-07-16 RX ADMIN — OXYCODONE HYDROCHLORIDE 5 MG: 5 TABLET ORAL at 20:38

## 2023-07-16 RX ADMIN — METOPROLOL SUCCINATE 25 MG: 25 TABLET, EXTENDED RELEASE ORAL at 20:34

## 2023-07-16 ASSESSMENT — PAIN DESCRIPTION - LOCATION: LOCATION: SHOULDER

## 2023-07-16 ASSESSMENT — PAIN SCALES - GENERAL: PAINLEVEL_OUTOF10: 9

## 2023-07-16 ASSESSMENT — PAIN SCALES - WONG BAKER: WONGBAKER_NUMERICALRESPONSE: 6

## 2023-07-16 NOTE — PLAN OF CARE
Problem: Chronic Conditions and Co-morbidities  Goal: Patient's chronic conditions and co-morbidity symptoms are monitored and maintained or improved  Outcome: Progressing     Problem: Discharge Planning  Goal: Discharge to home or other facility with appropriate resources  7/16/2023 1217 by Jarocho Greco RN  Outcome: Progressing  7/15/2023 2315 by Raheel Hills RN  Outcome: Progressing     Problem: Pain  Goal: Verbalizes/displays adequate comfort level or baseline comfort level  7/16/2023 1217 by Jarocho Greco RN  Outcome: Progressing  7/15/2023 2315 by Raheel Hills RN  Outcome: Progressing     Problem: Safety - Adult  Goal: Free from fall injury  7/16/2023 1217 by Jarocho Greco RN  Outcome: Progressing  7/15/2023 2315 by Raheel Hills RN  Outcome: Progressing     Problem: Skin/Tissue Integrity  Goal: Absence of new skin breakdown  Description: 1. Monitor for areas of redness and/or skin breakdown  2. Assess vascular access sites hourly  3. Every 4-6 hours minimum:  Change oxygen saturation probe site  4. Every 4-6 hours:  If on nasal continuous positive airway pressure, respiratory therapy assess nares and determine need for appliance change or resting period.   Outcome: Progressing     Problem: ABCDS Injury Assessment  Goal: Absence of physical injury  7/16/2023 1217 by Jarocho Greco RN  Outcome: Progressing  7/15/2023 2315 by Raheel Hills RN  Outcome: Progressing     Problem: Nutrition Deficit:  Goal: Optimize nutritional status  Outcome: Progressing     Problem: Neurosensory - Adult  Goal: Achieves stable or improved neurological status  Outcome: Progressing

## 2023-07-16 NOTE — PROGRESS NOTES
[facility] Inpatient   Resident Progress Note    S:  Hospital day: 11   Brief Synopsis: Mary Wu is a 78 y.o. female with a PMH of combined congestive heart failure, CKD, pulmonary sarcoidosis, pulmonary hypertension,ventral hernia, diabetes insipidus, hypothyroidism, HTN, and chronic normocytic anemia who presented on 7/5/23 for severe shortness of breath and cough productive of green sputum. Patient was placed on AVAPS but was decompensating, having increased work of breathing and tachypnea. Patient also developed afib RVR (had been in sinus rhythm upon presentation). Workup in ED showed multifocal pneumonia. Patient was admitted to ICU with acute hypoxic respiratory failure 2/2 pneumonia. Overnight/interim:   When initially evaluated this AM, the patient was saturating >90% on 4L O2, saying she felt mildly uncomfortable from laying in bed but otherwise well  Approx.  10:16, paged thatp atient attempted to get up to bedside chair when she desaturated to 78% on 4L O2 and began complaining of 8/10 substernal chest pain     Cont meds:    dextrose      sodium chloride       Scheduled meds:    digoxin  125 mcg Oral Daily    [Held by provider] midodrine  2.5 mg Oral TID WC    [Held by provider] furosemide  40 mg Oral Daily    budesonide  500 mcg Nebulization BID    arformoterol tartrate  15 mcg Nebulization BID    sodium chloride flush  5-40 mL IntraVENous 2 times per day    ipratropium 0.5 mg-albuterol 2.5 mg  1 Dose Inhalation Q4H WA    desmopressin  200 mcg Oral BID    DULoxetine  60 mg Oral Daily    apixaban  5 mg Oral BID    [Held by provider] hydrALAZINE  25 mg Oral 3 times per day    levETIRAcetam  500 mg Oral BID    levothyroxine  75 mcg Oral Daily    amiodarone  200 mg Oral Daily    metoprolol succinate  25 mg Oral BID    predniSONE  5 mg Oral Daily     PRN meds: methocarbamol, glucose, dextrose bolus **OR** dextrose bolus, glucagon (rDNA), dextrose, oxyCODONE, sodium chloride flush,

## 2023-07-16 NOTE — PLAN OF CARE
1016- Paged that patient attempted to get up to chair and desaturated to 78% on 4L O2, now having 8/10 chest pain. Ordered EKG, trop, CXR. O2 increased to 5L. Will continue to monitor.

## 2023-07-17 PROBLEM — J96.01 ACUTE RESPIRATORY FAILURE WITH HYPOXEMIA (HCC): Status: RESOLVED | Noted: 2023-07-05 | Resolved: 2023-07-17

## 2023-07-17 PROBLEM — J96.01 ACUTE RESPIRATORY FAILURE WITH HYPOXIA (HCC): Status: RESOLVED | Noted: 2018-02-08 | Resolved: 2023-07-17

## 2023-07-17 LAB
ALBUMIN SERPL-MCNC: 3.3 G/DL (ref 3.5–5.2)
ALP SERPL-CCNC: 95 U/L (ref 35–104)
ALT SERPL-CCNC: 12 U/L (ref 0–32)
ANION GAP SERPL CALCULATED.3IONS-SCNC: 7 MMOL/L (ref 7–16)
AST SERPL-CCNC: 21 U/L (ref 0–31)
BASOPHILS # BLD: 0.02 K/UL (ref 0–0.2)
BASOPHILS NFR BLD: 0 % (ref 0–2)
BILIRUB SERPL-MCNC: 0.4 MG/DL (ref 0–1.2)
BUN SERPL-MCNC: 16 MG/DL (ref 6–23)
CALCIUM SERPL-MCNC: 9.4 MG/DL (ref 8.6–10.2)
CHLORIDE SERPL-SCNC: 99 MMOL/L (ref 98–107)
CO2 SERPL-SCNC: 33 MMOL/L (ref 22–29)
CREAT SERPL-MCNC: 1.3 MG/DL (ref 0.5–1)
EOSINOPHIL # BLD: 0.11 K/UL (ref 0.05–0.5)
EOSINOPHILS RELATIVE PERCENT: 2 % (ref 0–6)
ERYTHROCYTE [DISTWIDTH] IN BLOOD BY AUTOMATED COUNT: 14 % (ref 11.5–15)
GFR SERPL CREATININE-BSD FRML MDRD: 44 ML/MIN/1.73M2
GLUCOSE SERPL-MCNC: 79 MG/DL (ref 74–99)
HCT VFR BLD AUTO: 30.3 % (ref 34–48)
HGB BLD-MCNC: 9.2 G/DL (ref 11.5–15.5)
LYMPHOCYTES # BLD: 8 % (ref 20–42)
LYMPHOCYTES NFR BLD: 0.56 K/UL (ref 1.5–4)
MAGNESIUM SERPL-MCNC: 1.8 MG/DL (ref 1.6–2.6)
MCH RBC QN AUTO: 26.4 PG (ref 26–35)
MCHC RBC AUTO-ENTMCNC: 30.4 G/DL (ref 32–34.5)
MCV RBC AUTO: 86.8 FL (ref 80–99.9)
MONOCYTES NFR BLD: 0.69 K/UL (ref 0.1–0.95)
MONOCYTES NFR BLD: 10 % (ref 2–12)
NEUTROPHILS NFR BLD: 80 % (ref 43–80)
NEUTS SEG NFR BLD: 5.83 K/UL (ref 1.8–7.3)
PLATELET # BLD AUTO: 180 K/UL (ref 130–450)
PMV BLD AUTO: 10.6 FL (ref 7–12)
POTASSIUM SERPL-SCNC: 3.5 MMOL/L (ref 3.5–5)
PROT SERPL-MCNC: 6.8 G/DL (ref 6.4–8.3)
RBC # BLD AUTO: 3.49 M/UL (ref 3.5–5.5)
RBC # BLD: ABNORMAL 10*6/UL
SODIUM SERPL-SCNC: 139 MMOL/L (ref 132–146)
WBC OTHER # BLD: 7.3 K/UL (ref 4.5–11.5)

## 2023-07-17 PROCEDURE — 6370000000 HC RX 637 (ALT 250 FOR IP): Performed by: INTERNAL MEDICINE

## 2023-07-17 PROCEDURE — 1200000000 HC SEMI PRIVATE

## 2023-07-17 PROCEDURE — 97530 THERAPEUTIC ACTIVITIES: CPT

## 2023-07-17 PROCEDURE — 6370000000 HC RX 637 (ALT 250 FOR IP)

## 2023-07-17 PROCEDURE — 97535 SELF CARE MNGMENT TRAINING: CPT

## 2023-07-17 PROCEDURE — 99238 HOSP IP/OBS DSCHRG MGMT 30/<: CPT | Performed by: FAMILY MEDICINE

## 2023-07-17 PROCEDURE — 94660 CPAP INITIATION&MGMT: CPT

## 2023-07-17 PROCEDURE — 80053 COMPREHEN METABOLIC PANEL: CPT

## 2023-07-17 PROCEDURE — 36415 COLL VENOUS BLD VENIPUNCTURE: CPT

## 2023-07-17 PROCEDURE — 2580000003 HC RX 258

## 2023-07-17 PROCEDURE — 6360000002 HC RX W HCPCS: Performed by: INTERNAL MEDICINE

## 2023-07-17 PROCEDURE — 83735 ASSAY OF MAGNESIUM: CPT

## 2023-07-17 PROCEDURE — 85027 COMPLETE CBC AUTOMATED: CPT

## 2023-07-17 PROCEDURE — 99233 SBSQ HOSP IP/OBS HIGH 50: CPT | Performed by: INTERNAL MEDICINE

## 2023-07-17 PROCEDURE — 94640 AIRWAY INHALATION TREATMENT: CPT

## 2023-07-17 PROCEDURE — 2700000000 HC OXYGEN THERAPY PER DAY

## 2023-07-17 RX ORDER — DIGOXIN 125 MCG
125 TABLET ORAL DAILY
Qty: 30 TABLET | Refills: 3 | Status: SHIPPED | OUTPATIENT
Start: 2023-07-18 | End: 2023-07-20 | Stop reason: DRUGHIGH

## 2023-07-17 RX ORDER — METOPROLOL SUCCINATE 50 MG/1
50 TABLET, EXTENDED RELEASE ORAL 2 TIMES DAILY
Status: DISCONTINUED | OUTPATIENT
Start: 2023-07-17 | End: 2023-07-19 | Stop reason: HOSPADM

## 2023-07-17 RX ORDER — PANTOPRAZOLE SODIUM 40 MG/1
40 TABLET, DELAYED RELEASE ORAL
Status: DISCONTINUED | OUTPATIENT
Start: 2023-07-17 | End: 2023-07-19 | Stop reason: HOSPADM

## 2023-07-17 RX ORDER — METOPROLOL SUCCINATE 50 MG/1
50 TABLET, EXTENDED RELEASE ORAL 2 TIMES DAILY
Qty: 30 TABLET | Refills: 3 | Status: SHIPPED | OUTPATIENT
Start: 2023-07-17

## 2023-07-17 RX ADMIN — DESMOPRESSIN ACETATE 200 MCG: 0.1 TABLET ORAL at 09:42

## 2023-07-17 RX ADMIN — LEVETIRACETAM 500 MG: 500 TABLET, FILM COATED ORAL at 20:46

## 2023-07-17 RX ADMIN — ARFORMOTEROL TARTRATE 15 MCG: 15 SOLUTION RESPIRATORY (INHALATION) at 07:41

## 2023-07-17 RX ADMIN — METOPROLOL SUCCINATE 25 MG: 25 TABLET, EXTENDED RELEASE ORAL at 08:20

## 2023-07-17 RX ADMIN — BUDESONIDE 500 MCG: 0.5 SUSPENSION RESPIRATORY (INHALATION) at 21:17

## 2023-07-17 RX ADMIN — Medication: at 10:54

## 2023-07-17 RX ADMIN — BUDESONIDE 500 MCG: 0.5 SUSPENSION RESPIRATORY (INHALATION) at 07:41

## 2023-07-17 RX ADMIN — DULOXETINE 60 MG: 60 CAPSULE, DELAYED RELEASE ORAL at 08:20

## 2023-07-17 RX ADMIN — SODIUM CHLORIDE, PRESERVATIVE FREE 10 ML: 5 INJECTION INTRAVENOUS at 08:21

## 2023-07-17 RX ADMIN — PANTOPRAZOLE SODIUM 40 MG: 40 TABLET, DELAYED RELEASE ORAL at 10:54

## 2023-07-17 RX ADMIN — DESMOPRESSIN ACETATE 200 MCG: 0.1 TABLET ORAL at 20:48

## 2023-07-17 RX ADMIN — METOPROLOL SUCCINATE 50 MG: 50 TABLET, EXTENDED RELEASE ORAL at 20:46

## 2023-07-17 RX ADMIN — AMIODARONE HYDROCHLORIDE 200 MG: 200 TABLET ORAL at 08:21

## 2023-07-17 RX ADMIN — DIGOXIN 125 MCG: 125 TABLET ORAL at 08:22

## 2023-07-17 RX ADMIN — IPRATROPIUM BROMIDE AND ALBUTEROL SULFATE 1 DOSE: .5; 2.5 SOLUTION RESPIRATORY (INHALATION) at 11:32

## 2023-07-17 RX ADMIN — LEVOTHYROXINE SODIUM 75 MCG: 0.07 TABLET ORAL at 08:21

## 2023-07-17 RX ADMIN — ARFORMOTEROL TARTRATE 15 MCG: 15 SOLUTION RESPIRATORY (INHALATION) at 21:17

## 2023-07-17 RX ADMIN — SODIUM CHLORIDE, PRESERVATIVE FREE 10 ML: 5 INJECTION INTRAVENOUS at 20:47

## 2023-07-17 RX ADMIN — IPRATROPIUM BROMIDE AND ALBUTEROL SULFATE 1 DOSE: .5; 2.5 SOLUTION RESPIRATORY (INHALATION) at 21:17

## 2023-07-17 RX ADMIN — OXYCODONE HYDROCHLORIDE 5 MG: 5 TABLET ORAL at 20:46

## 2023-07-17 RX ADMIN — OXYCODONE HYDROCHLORIDE 5 MG: 5 TABLET ORAL at 08:32

## 2023-07-17 RX ADMIN — PREDNISONE 5 MG: 5 TABLET ORAL at 08:20

## 2023-07-17 RX ADMIN — APIXABAN 5 MG: 5 TABLET, FILM COATED ORAL at 08:20

## 2023-07-17 RX ADMIN — IPRATROPIUM BROMIDE AND ALBUTEROL SULFATE 1 DOSE: .5; 2.5 SOLUTION RESPIRATORY (INHALATION) at 07:41

## 2023-07-17 RX ADMIN — APIXABAN 5 MG: 5 TABLET, FILM COATED ORAL at 20:46

## 2023-07-17 RX ADMIN — IPRATROPIUM BROMIDE AND ALBUTEROL SULFATE 1 DOSE: .5; 2.5 SOLUTION RESPIRATORY (INHALATION) at 16:15

## 2023-07-17 RX ADMIN — LEVETIRACETAM 500 MG: 500 TABLET, FILM COATED ORAL at 08:21

## 2023-07-17 ASSESSMENT — PAIN DESCRIPTION - ORIENTATION: ORIENTATION: RIGHT;ANTERIOR

## 2023-07-17 ASSESSMENT — PAIN SCALES - GENERAL
PAINLEVEL_OUTOF10: 10
PAINLEVEL_OUTOF10: 9

## 2023-07-17 ASSESSMENT — PAIN DESCRIPTION - DESCRIPTORS: DESCRIPTORS: SORE;SHARP

## 2023-07-17 ASSESSMENT — PAIN SCALES - WONG BAKER: WONGBAKER_NUMERICALRESPONSE: 6

## 2023-07-17 ASSESSMENT — PAIN DESCRIPTION - LOCATION: LOCATION: ABDOMEN;SHOULDER

## 2023-07-17 NOTE — PROGRESS NOTES
[facility] Cardiology Inpatient Progress Note    Patient is a 78 y.o. female of Terrance Jay MD seen in hospital follow up. Chief complaint: Afib/CP    HPI: Some SOB and CP.     Patient Active Problem List   Diagnosis    Chronic back pain    Hypothyroidism    Nephrosclerosis    Diabetes insipidus ([address])    Pulmonary sarcoidosis (HCC)    Steroid-induced avascular necrosis of shoulder ([address])    Anemia due to chronic illness    Chronic pain syndrome    Bilateral hip pain    Debility    BRBPR (bright red blood per rectum)    Severe pulmonary hypertension (HCC)    Chronic kidney disease, stage III (moderate) (HCC)    PAF (paroxysmal atrial fibrillation) ([address]) currently in NSR    Mixed hyperlipidemia    Goals of care, counseling/discussion    Atrial fibrillation with RVR (HCC)    Chronic combined systolic and diastolic heart failure (HCC)    Chronic respiratory failure with hypoxia (HCC)    Acute respiratory failure with hypoxia (HCC)    Mixed simple and mucopurulent chronic bronchitis (HCC)    Recurrent major depressive disorder, in partial remission (HCC)    Gait disturbance    Chest pain    Chronic, continuous use of opioids    PMB (postmenopausal bleeding)    Severe dysplasia of vagina    Epistaxis    Ventral hernia without obstruction or gangrene    Long term (current) use of systemic steroids    Nuclear senile cataract    Constipation    Abdominal pain    Small bowel obstruction (HCC)    Seizure ([address])    RVF (right ventricular failure) ([address])    Moderate protein-calorie malnutrition (HCC)    Hypokalemia    Primary hypertension    Acute respiratory failure with hypoxemia (HCC)       No Known Allergies    Current Facility-Administered Medications   Medication Dose Route Frequency Provider Last Rate Last Admin    pantoprazole (PROTONIX) tablet 40 mg  40 mg Oral QAM AC Nicolasa Contreras MD   40 mg at 07/17/23 1054    digoxin (LANOXIN) tablet 125 mcg  125 mcg Oral Daily Sami Patel MD   125 mcg at 07/17/23 1962 wave 25.00 mmHg   Cardiac index (thermodilution) 2.49 L/min/m2   Pulmonary vascular resistance 7.21 Wood Units   Mixed venous oxyhemoglobin 64.00 %         NICTRIC OXIDE / VASODILATORY TESTING ( 40 ppm NO / 5 mins)       Systolic .43 mmHg   Diastolic BP 74.17 mmHg   Right Atrial Pressure/CVP 35.47 mmHg   Systolic pulmonary artery pressure 71.00 mmHg   Mean pulmonary artery pressure 35.77 mmHg   Diastolic pulmonary artery pressure 40.00 mmHg   Pumonary artery pulse pressure 31.00 mmHg   Pulmonary artery occlusion pressure (mean) 15.00 mmHg   Pulmonary artery occlusion pressure (end-expiration) mid \"a\" wave 18.00 mmHg   Pulmonary artery occlusion pressure (end-expiration) peak \"v\" wave NA     Diastolic pulmonary gradient 22.00 mmHg   Transpulmonary gradient 32.00 mmHg         Cardiac output (thermodilution) 3.40 L/min   Cardiac index (thermodilution) 1.97 L/min/m2         Pulmonary vascular resistance 9.41 Wood Units   Summary:   1) Combined pre and postcapillary PH with borderline preserved cardiac index. Negative nitric oxide response. 2) R IJ thrombus (image above)     In response to nitric oxide, the mPAP decreased from 56 mmHg to 50 mmHg, CI paradoxically decreased from 2.5 L/min/m2 to 2.0 L/min/m2. The PVR has increased from 7.80 POSEY to 9.41 POSEY, while the PAWP decreased from 25 to 18 mmHg. The worsening PVR was due to combination of reduced CO and reduction in PAWP. Plan: Continue with ongoing optimization of LHD/HFpEF with current diuretic regimen. Educated on low sodium diet. Gentle diuresis. BP control. Consider initiation of PH therapy once volume status optimized given significant precapillary component. With regards to IJ thrombus, patient deemed poor candidate for anticoagulation given history of severe bleeding/blood loss anemia.   Defer management and ongoing recommendations to primary team.    ASSESSMENT & PLAN:    Patient Active Problem List   Diagnosis    Chronic back pain

## 2023-07-17 NOTE — CARE COORDINATION
Plan is home with  and [facility] when medically ready. Home health care orders are in. Ortho surgery, nephrology, cardiology and pulmonology are all following. Creatinine 1.3. Per Rahul Miguel from [facility], patient has a Bipap respiratory assist advice and 02 6 ltr with them. She is currently on O2 5 liters nasal cannula with O2 sat 99%. Last  PT/OT [facility] scorers are 12/24 & 15/24. Patient is adamant about not going to [facility] but remains agreeable to home health care. Therapy department notified of need for daily therapy TX's since plan is home. Patient's  will transport her home at time of discharge and will bring a portable O2 tank for the ride home.   Natalie Mayo RN CM  [phone]

## 2023-07-17 NOTE — DISCHARGE INSTRUCTIONS
=====================================  [facility], [address]  =====================================    Take your medications as directed in this summary    Future scheduled appointments are listed below or recommended appointments are mentioned above. Future Appointments   Date Time Provider [address]   7/25/2023  3:00 PM MD Megan Burns [facility]   8/1/2023 12:40 PM Francesco Guzman MD [address] [place] [address]   8/9/2023  1:40 PM Almyra Rinne, MD Norina Mazzoni [facility]       Call [facility] to confirm or schedule your appointment with Dr. Luann Yap,  as soon as possible and/or if there are any appointment changes or other issues. It is important that you follow up with Dr. Luann Yap for better monitoring of the reason of your hospitalization. Follow up with the specialists that saw you during your stay as well. If you have any questions call your PCP at [phone]. Instructions  Please follow-up with Dr. Luann Yap. Please follow-up with pulmonology outpatient. Please resume taking your medications. This includes your home doses of prednisone and Eliquis. You will also be started on Toprol 50 mg twice a day. Please take as prescribed. Please continue to use your BiPAP nightly and oxygen throughout the day. Do your best not to go without them. Once discharged from [facility], you can :     Return to work : Yes, you may return to work  Activity : As tolerated  Stairs : As tolerated  Exercise : As tolerated  Lifting : As tolerated   Sexual activity : Yes  Driving : With seat belt on. NO driving on narcotic pain medication if prescribed   Medications : Always take your medications as prescribed  Wound Care: none needed  Diet : You are asked to make an attempt to improve diet and exercise patterns to aid in medical management of your medical condition/problem. Call [facility] with any further questions.  Return to Emergency Department with any worsening of your condition and/or fever greater than 101 degrees, new weakness, shortness of breath or chest pain.

## 2023-07-17 NOTE — PROGRESS NOTES
Physical Therapy  Physical Therapy Treatment    Name: Adiel Graham  : 1955  MRN: [id number]      Date of Service: 2023    Evaluating PT:  Shanika Hancock PT, DPT  [id number]     Room #:  2123/6246-O  Diagnosis:  Acute respiratory failure with hypoxia ([address]) [J96.01]  Acute respiratory failure with hypoxemia ([address]) [J96.01]  Pneumonia of left lower lobe due to infectious organism [J18.9]  PMHx/PSHx:   has a past medical history of A-fib ([address]), Abnormal mammogram, Acute on chronic respiratory failure ([address]), Anemia, Anemia due to chronic illness, Ankle fracture, left, 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[address]), Backache, Benign hypertension, CHF (congestive heart failure) ([facility]), Chronic back pain, Chronic kidney disease, Chronic pain disorder, Chronic, continuous use of opioids, Compression fracture of thoracic vertebra ([facility]), COPD (chronic obstructive pulmonary disease) ([address]), Debility, Deformity of ankle and foot, acquired, Depression, Diabetes insipidus ([address]), Diverticulitis, Dry eyes, Encephalopathy acute, Gait disturbance, GERD (gastroesophageal reflux disease), Hemorrhoids, Hernia, History of blood transfusion, History of Clostridium difficile, Hyperlipidemia, Hypothyroidism, Ischemic colitis, enteritis, or enterocolitis ([address]), Long term (current) use of systemic steroids, Long-term current use of steroids, Lumbar disc herniation, Myalgia and myositis, unspecified, Nephrosclerosis, Nonunion of fracture, Osteoarthritis, PAF (paroxysmal atrial fibrillation) ([address]), 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[address]), Pulmonary hypertension ([address]), Pulmonary sarcoidosis ([address]), Rectal bleeding, Rhabdomyolysis, Steroid-induced avascular necrosis of shoulder ([address]), Tibia fracture, and Ventral hernia without obstruction or gangrene. has a past surgical history that includes fracture surgery (); Kyphosis surgery; Lumbar disc surgery; Tibia / fibia lengthening; other surgical history (11); Abdomen surgery ();

## 2023-07-17 NOTE — PROGRESS NOTES
OT BEDSIDE TREATMENT NOTE      Zachary Prell [address] [place] [address]  [address] , [place], [place]      [id number]  Patient Name: Cathyann Lanes  MRN: [id number]  : 1955  Room: [address] [place] [address]     Per OT Eval:     Evaluating OT: Maddie Rios, OTD,  OTR/L; [id number]     Referring Provider: Moriah Michelle DO   Specific Provider Orders/Date: OT eval and treat (23)     Diagnosis: Acute respiratory failure with hypoxia ([address]) [J96.01]  Acute respiratory failure with hypoxemia ([address]) [J96.01]  Pneumonia of left lower lobe due to infectious organism [J18.9]      Reason for admission: Pt admitted with SOB, pneumonia.      Surgery/Procedures: None this admission      Pertinent Medical History:    Past Medical History        Past Medical History:   Diagnosis Date    A-fib [facility])       follows with Dr Mi Omer    Abnormal mammogram     Acute on chronic respiratory failure ([address]) 2017    Anemia      Anemia due to chronic illness      Ankle fracture, left 2008    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    Backache      Benign hypertension      CHF (congestive heart failure) (HCC)      Chronic back pain      Chronic kidney disease       stage 3    Chronic pain disorder      Chronic, continuous use of opioids      Compression fracture of thoracic vertebra (HCC)       T10    COPD (chronic obstructive pulmonary disease) ([address])      Debility      Deformity of ankle and foot, acquired 2011    Depression      Diabetes insipidus ([address])      Diverticulitis      Dry eyes      Encephalopathy acute 2017    Gait disturbance      GERD (gastroesophageal reflux disease)      Hemorrhoids 2012    Hernia      History of blood transfusion approx 2017    History of Clostridium difficile       negative culture 12-15-15; resolved    Hyperlipidemia      Hypothyroidism      Ischemic colitis, enteritis, or enterocolitis ([address])      Long term (current) use of systemic date   Reports aides coming 3 days a week \"for however long I need them\" to assist with all ADLs  Dependent on  with all IADLs - however pt reports  unable to physical provide assist - nephews live near by and able to assist as needed  Owns [facility] using prior to admission      Pain Level: pt c/o chronic pain B hips & shoulders      Cognition: A&O: 3/4; Follows 2 step commands-pleasant & cooperative              Memory: F+             Comprehension: G-             Problem solving: F+             Judgement/safety: F+     UE Assessment:   Hand Dominance: Right [x]  Left []       ROM Strength STM goal: PRN   RUE  Limited shoulder flx, elbow/hand WFL Grossly 3/5  : WFL    Increase overall strength for participation in ADLs. LUE Limited shoulder flx, elbow/hand WFL Grossly 3/5  : WFL    Increase overall strength for participation in ADLs. Sensation: No c/o numbness/tingling in extremities.   Tone: WNL   Edema: unremarkable     Functional Assessment:  AM-PAC Daily Activity Raw Score: 15/24    Initial Eval Status  Date: 7/7/23 Treatment Status  Date: 7/17/23 STGs = LTGs  Time frame: 7-14 days   Feeding Set-up  Set Up  Tray set up seated in bedside chair                     Ind         Grooming Min A SBA  Seated EOB washing face                     Ind         UB dressing/bathing Mod A Min A  Doff/brennon gown seated EOB with cues for proper threading and assist d/t limited AROMs  Completed UB bathing tasks seated EOB                        SBA         LB dressing/bathing Max A Max A  Doff/brennon socks seated EOB using figure 4 technique  Assist with all bathing tasks d/t limited functional reach                     SBA          Toileting Max A Max A  Assist with all posterior pericare  Using bed pan seated on bedside chair - RN aware and to assist                     SBA      Bed Mobility  Supine to sit:   SBA     Sit to supine:   SBA Supine to sit:   NT - seated EOB with HOB elevated and L lateral

## 2023-07-17 NOTE — PLAN OF CARE
Problem: Chronic Conditions and Co-morbidities  Goal: Patient's chronic conditions and co-morbidity symptoms are monitored and maintained or improved  Outcome: Progressing     Problem: Discharge Planning  Goal: Discharge to home or other facility with appropriate resources  Outcome: Progressing     Problem: Pain  Goal: Verbalizes/displays adequate comfort level or baseline comfort level  Outcome: Progressing     Problem: Safety - Adult  Goal: Free from fall injury  Outcome: Progressing     Problem: Skin/Tissue Integrity  Goal: Absence of new skin breakdown  Description: 1. Monitor for areas of redness and/or skin breakdown  2. Assess vascular access sites hourly  3. Every 4-6 hours minimum:  Change oxygen saturation probe site  4. Every 4-6 hours:  If on nasal continuous positive airway pressure, respiratory therapy assess nares and determine need for appliance change or resting period.   Outcome: Progressing     Problem: ABCDS Injury Assessment  Goal: Absence of physical injury  Outcome: Progressing     Problem: Nutrition Deficit:  Goal: Optimize nutritional status  Outcome: Progressing     Problem: Neurosensory - Adult  Goal: Achieves stable or improved neurological status  Outcome: Progressing     Problem: Neurosensory - Adult  Goal: Absence of seizures  Outcome: Progressing     Problem: Neurosensory - Adult  Goal: Achieves maximal functionality and self care  Outcome: Progressing     Problem: Cardiovascular - Adult  Goal: Maintains optimal cardiac output and hemodynamic stability  Outcome: Progressing     Problem: Cardiovascular - Adult  Goal: Absence of cardiac dysrhythmias or at baseline  Outcome: Progressing     Problem: Musculoskeletal - Adult  Goal: Return mobility to safest level of function  Outcome: Progressing     Problem: Musculoskeletal - Adult  Goal: Maintain proper alignment of affected body part  Outcome: Progressing     Problem: Gastrointestinal - Adult  Goal: Maintains adequate nutritional

## 2023-07-18 ENCOUNTER — TELEPHONE (OUTPATIENT)
Dept: CARDIOLOGY CLINIC | Age: 68
End: 2023-07-18

## 2023-07-18 LAB
ALBUMIN SERPL-MCNC: 3.7 G/DL (ref 3.5–5.2)
ALP SERPL-CCNC: 100 U/L (ref 35–104)
ALT SERPL-CCNC: 12 U/L (ref 0–32)
ANION GAP SERPL CALCULATED.3IONS-SCNC: 12 MMOL/L (ref 7–16)
AST SERPL-CCNC: 20 U/L (ref 0–31)
BASOPHILS # BLD: 0 K/UL (ref 0–0.2)
BASOPHILS NFR BLD: 0 % (ref 0–2)
BILIRUB SERPL-MCNC: 0.5 MG/DL (ref 0–1.2)
BUN SERPL-MCNC: 18 MG/DL (ref 6–23)
CALCIUM SERPL-MCNC: 9.5 MG/DL (ref 8.6–10.2)
CHLORIDE SERPL-SCNC: 96 MMOL/L (ref 98–107)
CO2 SERPL-SCNC: 33 MMOL/L (ref 22–29)
CREAT SERPL-MCNC: 1.5 MG/DL (ref 0.5–1)
EKG ATRIAL RATE: 93 BPM
EKG P AXIS: 51 DEGREES
EKG P-R INTERVAL: 134 MS
EKG Q-T INTERVAL: 302 MS
EKG QRS DURATION: 86 MS
EKG QTC CALCULATION (BAZETT): 375 MS
EKG R AXIS: 86 DEGREES
EKG T AXIS: 57 DEGREES
EKG VENTRICULAR RATE: 93 BPM
EOSINOPHIL # BLD: 0.3 K/UL (ref 0.05–0.5)
EOSINOPHILS RELATIVE PERCENT: 4 % (ref 0–6)
ERYTHROCYTE [DISTWIDTH] IN BLOOD BY AUTOMATED COUNT: 14.1 % (ref 11.5–15)
GFR SERPL CREATININE-BSD FRML MDRD: 39 ML/MIN/1.73M2
GLUCOSE SERPL-MCNC: 85 MG/DL (ref 74–99)
HCT VFR BLD AUTO: 32.1 % (ref 34–48)
HGB BLD-MCNC: 9.6 G/DL (ref 11.5–15.5)
LYMPHOCYTES # BLD: 7 % (ref 20–42)
LYMPHOCYTES NFR BLD: 0.6 K/UL (ref 1.5–4)
MCH RBC QN AUTO: 26.2 PG (ref 26–35)
MCHC RBC AUTO-ENTMCNC: 29.9 G/DL (ref 32–34.5)
MCV RBC AUTO: 87.5 FL (ref 80–99.9)
MONOCYTES NFR BLD: 0.67 K/UL (ref 0.1–0.95)
MONOCYTES NFR BLD: 8 % (ref 2–12)
NEUTROPHILS NFR BLD: 82 % (ref 43–80)
NEUTS SEG NFR BLD: 7.03 K/UL (ref 1.8–7.3)
PLATELET # BLD AUTO: 212 K/UL (ref 130–450)
PMV BLD AUTO: 11.3 FL (ref 7–12)
POTASSIUM SERPL-SCNC: 3.6 MMOL/L (ref 3.5–5)
PROT SERPL-MCNC: 7.4 G/DL (ref 6.4–8.3)
RBC # BLD AUTO: 3.67 M/UL (ref 3.5–5.5)
RBC # BLD: ABNORMAL 10*6/UL
SODIUM SERPL-SCNC: 141 MMOL/L (ref 132–146)
WBC OTHER # BLD: 8.6 K/UL (ref 4.5–11.5)

## 2023-07-18 PROCEDURE — 6370000000 HC RX 637 (ALT 250 FOR IP)

## 2023-07-18 PROCEDURE — 94640 AIRWAY INHALATION TREATMENT: CPT

## 2023-07-18 PROCEDURE — 80053 COMPREHEN METABOLIC PANEL: CPT

## 2023-07-18 PROCEDURE — 99233 SBSQ HOSP IP/OBS HIGH 50: CPT | Performed by: INTERNAL MEDICINE

## 2023-07-18 PROCEDURE — 94660 CPAP INITIATION&MGMT: CPT

## 2023-07-18 PROCEDURE — 85027 COMPLETE CBC AUTOMATED: CPT

## 2023-07-18 PROCEDURE — 2580000003 HC RX 258

## 2023-07-18 PROCEDURE — 1200000000 HC SEMI PRIVATE

## 2023-07-18 PROCEDURE — 6370000000 HC RX 637 (ALT 250 FOR IP): Performed by: INTERNAL MEDICINE

## 2023-07-18 PROCEDURE — 6360000002 HC RX W HCPCS: Performed by: INTERNAL MEDICINE

## 2023-07-18 PROCEDURE — 99232 SBSQ HOSP IP/OBS MODERATE 35: CPT | Performed by: FAMILY MEDICINE

## 2023-07-18 PROCEDURE — 2700000000 HC OXYGEN THERAPY PER DAY

## 2023-07-18 PROCEDURE — 36415 COLL VENOUS BLD VENIPUNCTURE: CPT

## 2023-07-18 RX ADMIN — DESMOPRESSIN ACETATE 200 MCG: 0.1 TABLET ORAL at 08:36

## 2023-07-18 RX ADMIN — BUDESONIDE 500 MCG: 0.5 SUSPENSION RESPIRATORY (INHALATION) at 07:48

## 2023-07-18 RX ADMIN — IPRATROPIUM BROMIDE AND ALBUTEROL SULFATE 1 DOSE: .5; 2.5 SOLUTION RESPIRATORY (INHALATION) at 19:56

## 2023-07-18 RX ADMIN — DESMOPRESSIN ACETATE 200 MCG: 0.1 TABLET ORAL at 20:16

## 2023-07-18 RX ADMIN — LEVOTHYROXINE SODIUM 75 MCG: 0.07 TABLET ORAL at 08:35

## 2023-07-18 RX ADMIN — ARFORMOTEROL TARTRATE 15 MCG: 15 SOLUTION RESPIRATORY (INHALATION) at 07:47

## 2023-07-18 RX ADMIN — PREDNISONE 5 MG: 5 TABLET ORAL at 08:35

## 2023-07-18 RX ADMIN — SODIUM CHLORIDE, PRESERVATIVE FREE 10 ML: 5 INJECTION INTRAVENOUS at 08:37

## 2023-07-18 RX ADMIN — OXYCODONE HYDROCHLORIDE 5 MG: 5 TABLET ORAL at 21:12

## 2023-07-18 RX ADMIN — OXYCODONE HYDROCHLORIDE 5 MG: 5 TABLET ORAL at 08:41

## 2023-07-18 RX ADMIN — IPRATROPIUM BROMIDE AND ALBUTEROL SULFATE 1 DOSE: .5; 2.5 SOLUTION RESPIRATORY (INHALATION) at 15:26

## 2023-07-18 RX ADMIN — METHOCARBAMOL 750 MG: 750 TABLET ORAL at 03:58

## 2023-07-18 RX ADMIN — LEVETIRACETAM 500 MG: 500 TABLET, FILM COATED ORAL at 08:35

## 2023-07-18 RX ADMIN — METOPROLOL SUCCINATE 50 MG: 50 TABLET, EXTENDED RELEASE ORAL at 08:35

## 2023-07-18 RX ADMIN — IPRATROPIUM BROMIDE AND ALBUTEROL SULFATE 1 DOSE: .5; 2.5 SOLUTION RESPIRATORY (INHALATION) at 07:47

## 2023-07-18 RX ADMIN — LEVETIRACETAM 500 MG: 500 TABLET, FILM COATED ORAL at 20:16

## 2023-07-18 RX ADMIN — DIGOXIN 125 MCG: 125 TABLET ORAL at 08:38

## 2023-07-18 RX ADMIN — ARFORMOTEROL TARTRATE 15 MCG: 15 SOLUTION RESPIRATORY (INHALATION) at 19:56

## 2023-07-18 RX ADMIN — AMIODARONE HYDROCHLORIDE 200 MG: 200 TABLET ORAL at 08:35

## 2023-07-18 RX ADMIN — APIXABAN 5 MG: 5 TABLET, FILM COATED ORAL at 08:35

## 2023-07-18 RX ADMIN — DULOXETINE 60 MG: 60 CAPSULE, DELAYED RELEASE ORAL at 08:35

## 2023-07-18 RX ADMIN — BUDESONIDE 500 MCG: 0.5 SUSPENSION RESPIRATORY (INHALATION) at 19:56

## 2023-07-18 RX ADMIN — IPRATROPIUM BROMIDE AND ALBUTEROL SULFATE 1 DOSE: .5; 2.5 SOLUTION RESPIRATORY (INHALATION) at 12:15

## 2023-07-18 RX ADMIN — METOPROLOL SUCCINATE 50 MG: 50 TABLET, EXTENDED RELEASE ORAL at 20:16

## 2023-07-18 RX ADMIN — PANTOPRAZOLE SODIUM 40 MG: 40 TABLET, DELAYED RELEASE ORAL at 05:43

## 2023-07-18 RX ADMIN — APIXABAN 5 MG: 5 TABLET, FILM COATED ORAL at 20:16

## 2023-07-18 ASSESSMENT — PAIN DESCRIPTION - ORIENTATION
ORIENTATION: RIGHT
ORIENTATION: LEFT

## 2023-07-18 ASSESSMENT — PAIN SCALES - WONG BAKER
WONGBAKER_NUMERICALRESPONSE: 0
WONGBAKER_NUMERICALRESPONSE: 2
WONGBAKER_NUMERICALRESPONSE: 0
WONGBAKER_NUMERICALRESPONSE: 2
WONGBAKER_NUMERICALRESPONSE: 2

## 2023-07-18 ASSESSMENT — PAIN SCALES - GENERAL
PAINLEVEL_OUTOF10: 10
PAINLEVEL_OUTOF10: 10
PAINLEVEL_OUTOF10: 2
PAINLEVEL_OUTOF10: 8

## 2023-07-18 ASSESSMENT — PAIN DESCRIPTION - LOCATION
LOCATION: SHOULDER
LOCATION: HIP;SHOULDER

## 2023-07-18 ASSESSMENT — PAIN DESCRIPTION - DESCRIPTORS
DESCRIPTORS: SHARP;SORE;ACHING
DESCRIPTORS: DISCOMFORT;SORE

## 2023-07-18 NOTE — TELEPHONE ENCOUNTER
\"Left message on Mr. Tess Moore voicemail to confirm appointment at [facility] CHF clinic tomorrow at 1 PM, asking PT to come in at 10:45 AM or 12:45 PM  with Lindsay Briceno\" CF

## 2023-07-18 NOTE — PLAN OF CARE

## 2023-07-18 NOTE — PROGRESS NOTES
throughout both lungs. Enlargement of the main pulmonary artery up to 4.2 cm. This is nonspecific   but can be seen with pulmonary artery hypertension. Multiple compression fracture deformities of the thoracic spine including an   age-indeterminate but subacute to chronic appearing mild anterior compression   fracture at T5 and moderate anterior compression fracture deformity at T7,   not significantly changed in appearance compared with June 14, 2023. XR CHEST PORTABLE   Final Result   Mild likely bibasilar atelectasis. EKG: Rate 93, sinus rhythm, rightward axis deviation, precordial T-wave inversions and RBBB present in previous EKG, possible worsening ST depression in lead V2      Resident Assessment and Plan       Acute hypoxic respiratory failure 2/2 multifocal pneumonia  +MRSA nares- mupirocin ointment for 7 days  Patient tolerating nasal cannula well, at home oxygen, maintain O2 sat >88%, pulmonology following- appreciate recommendations  Patient will need further workup for pulmonary [facility] at [facility]  Patient has been cleared for discharge from pulmonology standpoint    2. New-onset chest pain, hypoxia with hx atrial fibrillation, combined systolic/diastolic heart failure  Patient had exertional chest pain and converted to afib in the last day- cardiology saw and confirmed patient is still approved for discharge  Per cardiology recs- amiodarone, digoxin, apixaban and metoprolol succinate per their recommendations  Was originally on 40 mg Lasix IV- on hold d/t IRMA, appreciate nephro recs  Continue to hold antihypertensive medications including hydralazine, amlodipine    3. Acute kidney injury  Worsening- Cr trended up to 1.5 from 1.3  Nephrology following- appreciate recs  Pre-renal etiology likely, as FeUrea 26%  Vancomycin and Lasix on hold d/t IRMA     4. Hx seizures  No seizure activity noted  Continue home Keppra     5.  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 twice daily  Continue home levothyroxine  Continue prednisone 5 mg PO daily   Continue midodrine 2.5 mg p.o. 3 times daily  Lasix on hold     6. Hx R IJ thrombus  Continue Eliquis 5 mg BID   Hb at 8.8 today, currently stable  Monitor for signs of bleeding    7. Left tibia fracture  Xray left tibia / fibula showing subacute healing fracture of the proximal left tibial shaft and areas of sclerosis in the lateral tibial epiphysis compatible with compression fracture  Oxycodone 5 mg as needed q12h, Robaxin 750 mg QID  Patient more awake and alert, will continue at this dose Robaxin  Patient was in the past on percocet- was tapered off during previous ICU admission  Consider repeating dexa scan in a patient with chronic corticosteroids as outpatient  Ok to discharge from orthopedic surgery, old fracture, no need for surgical intervention at this time      PT 10/24 and OT 14/24  DVT/GI ppx: Eliquis/not indicated    Disposition- remains inpatient       Electronically signed by Sasha Guadalupe MD on 7/18/2023 at 12:42 PM  Attending physician: Dr. Dylan Donald

## 2023-07-18 NOTE — PROGRESS NOTES
[facility] Cardiology Inpatient Progress Note    Patient is a 78 y.o. female of Beni Coelho MD seen in hospital follow up. Chief complaint: Afib/CP    HPI: Some SOB and CP.     Patient Active Problem List   Diagnosis    Chronic back pain    Hypothyroidism    Nephrosclerosis    Diabetes insipidus ([address])    Pulmonary sarcoidosis ([address])    Steroid-induced avascular necrosis of shoulder ([address])    Anemia due to chronic illness    Chronic pain syndrome    Bilateral hip pain    Debility    BRBPR (bright red blood per rectum)    Severe pulmonary hypertension (HCC)    Chronic kidney disease, stage III (moderate) (HCC)    PAF (paroxysmal atrial fibrillation) ([address]) currently in NSR    Mixed hyperlipidemia    Goals of care, counseling/discussion    Atrial fibrillation with RVR (HCC)    Chronic combined systolic and diastolic heart failure (HCC)    Chronic respiratory failure with hypoxia (HCC)    Mixed simple and mucopurulent chronic bronchitis (HCC)    Recurrent major depressive disorder, in partial remission (HCC)    Gait disturbance    Chest pain    Chronic, continuous use of opioids    PMB (postmenopausal bleeding)    Severe dysplasia of vagina    Epistaxis    Ventral hernia without obstruction or gangrene    Long term (current) use of systemic steroids    Nuclear senile cataract    Constipation    Abdominal pain    Small bowel obstruction (HCC)    Seizure ([address])    RVF (right ventricular failure) (HCC)    Moderate protein-calorie malnutrition (HCC)    Hypokalemia    Primary hypertension       No Known Allergies    Current Facility-Administered Medications   Medication Dose Route Frequency Provider Last Rate Last Admin    pantoprazole (PROTONIX) tablet 40 mg  40 mg Oral QAM AC Nini Mendoza MD   40 mg at 07/18/23 0543    metoprolol succinate (TOPROL XL) extended release tablet 50 mg  50 mg Oral BID Wash Curet, DO   50 mg at 07/17/23 2046    digoxin (LANOXIN) tablet 125 mcg  125 mcg Oral Daily Floyd Lopez MD thyromegaly present. Cardiovascular: Normal rate, regular rhythm, normal heart sounds. intact distal pulses. No gallop and no friction rub. No murmur heard. Pulmonary: Breath sounds normal. No respiratory distress. No wheezes. No rales. Chest: [place] normal. No tenderness. Abdominal: Soft. Bowel sounds are normal. No distension or mass. No tenderness, rebound or guarding. Musculoskeletal: . No tenderness. No clubbing or cyanosis. Extremitites: Intact distal pulses. No edema  Neurological: Alert and oriented to person, place, and time. Skin: Skin is warm and dry. No rash noted. Not diaphoretic. No erythema. Psychiatric: Normal mood and affect. Behavior is normal.   Lymphadenopathy: No cervical adenopathy. No groin adenopathy.     CBC:   Lab Results   Component Value Date/Time    WBC 7.3 07/17/2023 05:09 AM    RBC 3.49 07/17/2023 05:09 AM    RBC 3.57 12/14/2021 09:57 AM    HGB 9.2 07/17/2023 05:09 AM    HCT 30.3 07/17/2023 05:09 AM    HCT 41.0 04/28/2023 02:54 PM    MCV 86.8 07/17/2023 05:09 AM    MCH 26.4 07/17/2023 05:09 AM    MCHC 30.4 07/17/2023 05:09 AM    RDW 14.0 07/17/2023 05:09 AM     07/17/2023 05:09 AM    MPV 10.6 07/17/2023 05:09 AM     BMP:   Lab Results   Component Value Date/Time     07/17/2023 05:09 AM    K 3.5 07/17/2023 05:09 AM    K 3.8 07/14/2023 04:12 AM    CL 99 07/17/2023 05:09 AM    CO2 33 07/17/2023 05:09 AM    BUN 16 07/17/2023 05:09 AM    LABALBU 3.3 07/17/2023 05:09 AM    LABALBU 3.6 05/02/2012 07:40 PM    CREATININE 1.3 07/17/2023 05:09 AM    CALCIUM 9.4 07/17/2023 05:09 AM    GFRAA >60 10/04/2022 06:02 AM    LABGLOM 44 07/17/2023 05:09 AM     Magnesium:    Lab Results   Component Value Date/Time    MG 1.8 07/17/2023 05:09 AM     Cardiac Enzymes:   Lab Results   Component Value Date    CKTOTAL 89 05/17/2023    CKTOTAL 64 08/23/2018    CKTOTAL 67 08/24/2017    CKMB 2.2 08/24/2017    CKMB 1.3 10/03/2016    CKMB 1.6 10/03/2016    TROPHS 32 (H) 07/16/2023    TROPHS

## 2023-07-18 NOTE — CARE COORDINATION
Discharge order noted. Keena from [address] [place] [address] notified of patient discharging home today. Per Hunter Carr from [facility], patient has a Bipap respiratory assist advice and 02 6 ltr with them. She is currently on O2 5 liters nasal cannula with O2 sat 91%. Last  PT/OT [facility] scorers are 12/24 & 15/24. Patient is adamant about not going to [facility] but remains agreeable to home health care. Patient's  will transport her home at time of discharge and will bring a portable O2 tank for the ride home.   Sisto Schirmer RN CM  [phone]

## 2023-07-19 VITALS
OXYGEN SATURATION: 98 % | RESPIRATION RATE: 18 BRPM | DIASTOLIC BLOOD PRESSURE: 94 MMHG | SYSTOLIC BLOOD PRESSURE: 126 MMHG | HEART RATE: 68 BPM | HEIGHT: 65 IN | BODY MASS INDEX: 24.66 KG/M2 | WEIGHT: 148 LBS | TEMPERATURE: 98.5 F

## 2023-07-19 LAB
ALBUMIN SERPL-MCNC: 3.5 G/DL (ref 3.5–5.2)
ALP SERPL-CCNC: 99 U/L (ref 35–104)
ALT SERPL-CCNC: 11 U/L (ref 0–32)
ANION GAP SERPL CALCULATED.3IONS-SCNC: 7 MMOL/L (ref 7–16)
AST SERPL-CCNC: 22 U/L (ref 0–31)
BASOPHILS # BLD: 0.04 K/UL (ref 0–0.2)
BASOPHILS NFR BLD: 1 % (ref 0–2)
BILIRUB SERPL-MCNC: 0.5 MG/DL (ref 0–1.2)
BNP SERPL-MCNC: 6117 PG/ML (ref 0–125)
BUN SERPL-MCNC: 19 MG/DL (ref 6–23)
CALCIUM SERPL-MCNC: 9.3 MG/DL (ref 8.6–10.2)
CHLORIDE SERPL-SCNC: 98 MMOL/L (ref 98–107)
CO2 SERPL-SCNC: 33 MMOL/L (ref 22–29)
CREAT SERPL-MCNC: 1.4 MG/DL (ref 0.5–1)
EOSINOPHIL # BLD: 0.14 K/UL (ref 0.05–0.5)
EOSINOPHILS RELATIVE PERCENT: 2 % (ref 0–6)
ERYTHROCYTE [DISTWIDTH] IN BLOOD BY AUTOMATED COUNT: 13.9 % (ref 11.5–15)
GFR SERPL CREATININE-BSD FRML MDRD: 41 ML/MIN/1.73M2
GLUCOSE SERPL-MCNC: 67 MG/DL (ref 74–99)
HCT VFR BLD AUTO: 31.5 % (ref 34–48)
HGB BLD-MCNC: 9.5 G/DL (ref 11.5–15.5)
IMM GRANULOCYTES # BLD AUTO: 0.05 K/UL (ref 0–0.58)
IMM GRANULOCYTES NFR BLD: 1 % (ref 0–5)
LYMPHOCYTES # BLD: 9 % (ref 20–42)
LYMPHOCYTES NFR BLD: 0.65 K/UL (ref 1.5–4)
MCH RBC QN AUTO: 26.2 PG (ref 26–35)
MCHC RBC AUTO-ENTMCNC: 30.2 G/DL (ref 32–34.5)
MCV RBC AUTO: 86.8 FL (ref 80–99.9)
MONOCYTES NFR BLD: 0.71 K/UL (ref 0.1–0.95)
MONOCYTES NFR BLD: 10 % (ref 2–12)
NEUTROPHILS NFR BLD: 79 % (ref 43–80)
NEUTS SEG NFR BLD: 5.8 K/UL (ref 1.8–7.3)
PLATELET # BLD AUTO: 220 K/UL (ref 130–450)
PMV BLD AUTO: 11.2 FL (ref 7–12)
POTASSIUM SERPL-SCNC: 3.9 MMOL/L (ref 3.5–5)
PROT SERPL-MCNC: 7 G/DL (ref 6.4–8.3)
RBC # BLD AUTO: 3.63 M/UL (ref 3.5–5.5)
SODIUM SERPL-SCNC: 138 MMOL/L (ref 132–146)
WBC OTHER # BLD: 7.4 K/UL (ref 4.5–11.5)

## 2023-07-19 PROCEDURE — 97530 THERAPEUTIC ACTIVITIES: CPT

## 2023-07-19 PROCEDURE — 6370000000 HC RX 637 (ALT 250 FOR IP)

## 2023-07-19 PROCEDURE — 83880 ASSAY OF NATRIURETIC PEPTIDE: CPT

## 2023-07-19 PROCEDURE — 6370000000 HC RX 637 (ALT 250 FOR IP): Performed by: INTERNAL MEDICINE

## 2023-07-19 PROCEDURE — 99233 SBSQ HOSP IP/OBS HIGH 50: CPT | Performed by: INTERNAL MEDICINE

## 2023-07-19 PROCEDURE — 85027 COMPLETE CBC AUTOMATED: CPT

## 2023-07-19 PROCEDURE — 2700000000 HC OXYGEN THERAPY PER DAY

## 2023-07-19 PROCEDURE — 99238 HOSP IP/OBS DSCHRG MGMT 30/<: CPT | Performed by: FAMILY MEDICINE

## 2023-07-19 PROCEDURE — 94640 AIRWAY INHALATION TREATMENT: CPT

## 2023-07-19 PROCEDURE — 97535 SELF CARE MNGMENT TRAINING: CPT

## 2023-07-19 PROCEDURE — 94660 CPAP INITIATION&MGMT: CPT

## 2023-07-19 PROCEDURE — 36415 COLL VENOUS BLD VENIPUNCTURE: CPT

## 2023-07-19 PROCEDURE — 6360000002 HC RX W HCPCS: Performed by: INTERNAL MEDICINE

## 2023-07-19 PROCEDURE — 80053 COMPREHEN METABOLIC PANEL: CPT

## 2023-07-19 RX ADMIN — APIXABAN 5 MG: 5 TABLET, FILM COATED ORAL at 09:06

## 2023-07-19 RX ADMIN — LEVETIRACETAM 500 MG: 500 TABLET, FILM COATED ORAL at 09:06

## 2023-07-19 RX ADMIN — DULOXETINE 60 MG: 60 CAPSULE, DELAYED RELEASE ORAL at 09:05

## 2023-07-19 RX ADMIN — OXYCODONE HYDROCHLORIDE 5 MG: 5 TABLET ORAL at 09:16

## 2023-07-19 RX ADMIN — DESMOPRESSIN ACETATE 200 MCG: 0.1 TABLET ORAL at 09:03

## 2023-07-19 RX ADMIN — DIGOXIN 125 MCG: 125 TABLET ORAL at 09:05

## 2023-07-19 RX ADMIN — PREDNISONE 5 MG: 5 TABLET ORAL at 09:05

## 2023-07-19 RX ADMIN — BUDESONIDE 500 MCG: 0.5 SUSPENSION RESPIRATORY (INHALATION) at 10:55

## 2023-07-19 RX ADMIN — IPRATROPIUM BROMIDE AND ALBUTEROL SULFATE 1 DOSE: .5; 2.5 SOLUTION RESPIRATORY (INHALATION) at 14:21

## 2023-07-19 RX ADMIN — LEVOTHYROXINE SODIUM 75 MCG: 0.07 TABLET ORAL at 09:05

## 2023-07-19 RX ADMIN — METOPROLOL SUCCINATE 50 MG: 50 TABLET, EXTENDED RELEASE ORAL at 09:06

## 2023-07-19 RX ADMIN — IPRATROPIUM BROMIDE AND ALBUTEROL SULFATE 1 DOSE: .5; 2.5 SOLUTION RESPIRATORY (INHALATION) at 10:55

## 2023-07-19 RX ADMIN — AMIODARONE HYDROCHLORIDE 200 MG: 200 TABLET ORAL at 09:06

## 2023-07-19 RX ADMIN — PANTOPRAZOLE SODIUM 40 MG: 40 TABLET, DELAYED RELEASE ORAL at 09:06

## 2023-07-19 RX ADMIN — ARFORMOTEROL TARTRATE 15 MCG: 15 SOLUTION RESPIRATORY (INHALATION) at 10:55

## 2023-07-19 ASSESSMENT — PAIN SCALES - WONG BAKER: WONGBAKER_NUMERICALRESPONSE: 0

## 2023-07-19 ASSESSMENT — PAIN DESCRIPTION - DESCRIPTORS: DESCRIPTORS: SHARP;THROBBING

## 2023-07-19 ASSESSMENT — PAIN SCALES - GENERAL: PAINLEVEL_OUTOF10: 9

## 2023-07-19 ASSESSMENT — PAIN DESCRIPTION - ORIENTATION: ORIENTATION: RIGHT

## 2023-07-19 ASSESSMENT — PAIN DESCRIPTION - LOCATION: LOCATION: SHOULDER

## 2023-07-19 NOTE — PROGRESS NOTES
OT BEDSIDE TREATMENT NOTE      [place] [address] [place] [address]  [address], [place], [place]      HPXF:  Patient Name: Leobardo Alejandre  MRN: [id number]  : 1955  Room: [address] [place] [address]     Per OT Eval:     Evaluating OT: Cami Harman, CASEYD,  OTR/L; [id number]     Referring Provider: Thom Germain DO   Specific Provider Orders/Date: OT eval and treat (23)     Diagnosis: Acute respiratory failure with hypoxia ([address]) [J96.01]  Acute respiratory failure with hypoxemia ([address]) [J96.01]  Pneumonia of left lower lobe due to infectious organism [J18.9]      Reason for admission: Pt admitted with SOB, pneumonia.      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    Backache      Benign hypertension      CHF (congestive heart failure) (HCC)      Chronic back pain      Chronic kidney disease       stage 3    Chronic pain disorder      Chronic, continuous use of opioids      Compression fracture of thoracic vertebra (HCC)       T10    COPD (chronic obstructive pulmonary disease) ([address])      Debility      Deformity of ankle and foot, acquired 2011    Depression      Diabetes insipidus ([address])      Diverticulitis      Dry eyes      Encephalopathy acute 2017    Gait disturbance      GERD (gastroesophageal reflux disease)      Hemorrhoids 2012    Hernia      History of blood transfusion approx 2017    History of Clostridium difficile       negative culture 12-15-15; resolved    Hyperlipidemia      Hypothyroidism      Ischemic colitis, enteritis, or enterocolitis ([address])      Long term (current) use of systemic Ind      Functional Transfers Sit to stand:   NT     Stand to sit:   NT     Stand Pivot:   NT Mod A   Sit < > stand  EOB > walker                             SBA pending weight bearing recommendations      Functional Mobility NT Max A with ww   EOB > Bedside chair  With difficulty advancing B LE & poor eccentric control                      SBA with appropriate AD- pending WB precautions         Balance Sitting:     Static:  SBA    Dynamic:Min A  Standing: NT Sitting: SBA <> Sup  Standing: Mod/Max A with ww Sitting:     Static: Ind    Dynamic:SBA  Standing: SBA pending WB precautions                   Endurance/Activity Tolerance    fair tolerance with light activity. FAIR  Limited activity tolerance required increased time and slow pace for all activities  Increased SOB with minimal tasks   O2 stats dropping with transfers 83-85% on 5L, slowly recovered with PLB techniques                      WFL  For full ADL/light IADLs   Visual/  Perceptual Needs glasses                               Comments: Upon arrival pt was trying to get to OOB to edge of bed & asking to get dressed for home d/c. Pt educated through out treatment regarding proper technique & safety with bed mobility, functional transfers with walker safety, energy conservation techniques & completion of ADL tasks with modified techniques to improve independence, safety, prevent falls and allow pt to return to prior level of function. OT will continue with POC with focus on increasing independence on ADLs. At end of session, pt seated in bedside chair and RN notified with all lines and tubes intact, call light within reach. Pt has made FAIR progress towards set goals.    Continue with current plan of care    Treatment Time In: 1:20            Treatment Time Out: 2:00               Treatment Charges: Mins Units   Ther Ex  05010     Manual Therapy 08122     Thera Activities 49742 10 1   ADL/Home Mgt 50013 30 2   Neuro Re-ed [id number]     Group

## 2023-07-19 NOTE — CARE COORDINATION
Discharge order is in. Keena from [address] [place] [address] notified of patient discharging home today. Patient did not discharge yesterday due to increase in creatinine. Today creatinine 1.4. Okay to discharge from renal point of view. Per Raul Liu from [facility], patient has a Bipap respiratory assist advice and 02 6 ltr with them. She is currently on O2 5 liters nasal cannula with O2 sat 98%. Last  PT/OT [facility] scorers are 12/24 & 15/24. Patient is adamant about not going to [facility] but remains agreeable to home health care. Patient's  will transport her home today and will bring a portable O2 tank for the ride home.     Kartik Snyder RN CM  [phone]

## 2023-07-19 NOTE — PROGRESS NOTES
Date: 7/18/2023    Time: 10:15 PM    Patient Placed On BIPAP/CPAP/ Non-Invasive Ventilation? Yes    If no must comment. Facial area red/color change? No           If YES are Blister/Lesion present? No   If yes must notify nursing staff  BIPAP/CPAP skin barrier?   Yes    Skin barrier type:mepilexlite       Comments:        Laurence Butler RCP

## 2023-07-19 NOTE — PLAN OF CARE
Problem: Chronic Conditions and Co-morbidities  Goal: Patient's chronic conditions and co-morbidity symptoms are monitored and maintained or improved  7/19/2023 1227 by Razia Arriaga RN  Outcome: Progressing  7/19/2023 0317 by Shane Romo RN  Outcome: Progressing     Problem: Discharge Planning  Goal: Discharge to home or other facility with appropriate resources  7/19/2023 1227 by Razia Arriaga RN  Outcome: Progressing  7/19/2023 0317 by Shane Romo RN  Outcome: Progressing     Problem: Pain  Goal: Verbalizes/displays adequate comfort level or baseline comfort level  7/19/2023 1227 by Razia Arriaga RN  Outcome: Progressing  7/19/2023 0317 by Shane Romo RN  Outcome: Progressing     Problem: Safety - Adult  Goal: Free from fall injury  7/19/2023 1227 by Razia Arriaga RN  Outcome: Progressing  7/19/2023 0317 by Shane Romo RN  Outcome: Progressing     Problem: Skin/Tissue Integrity  Goal: Absence of new skin breakdown  Description: 1. Monitor for areas of redness and/or skin breakdown  2. Assess vascular access sites hourly  3. Every 4-6 hours minimum:  Change oxygen saturation probe site  4. Every 4-6 hours:  If on nasal continuous positive airway pressure, respiratory therapy assess nares and determine need for appliance change or resting period.   7/19/2023 1227 by Razia Arriaga RN  Outcome: Progressing  7/19/2023 0317 by Shane Romo RN  Outcome: Progressing     Problem: ABCDS Injury Assessment  Goal: Absence of physical injury  7/19/2023 1227 by Razia Arriaga RN  Outcome: Progressing  7/19/2023 0317 by Shane Romo RN  Outcome: Progressing     Problem: Nutrition Deficit:  Goal: Optimize nutritional status  7/19/2023 1227 by Razia Arriaga RN  Outcome: Progressing  7/19/2023 0317 by Shane Romo RN  Outcome: Progressing     Problem: Neurosensory - Adult  Goal: Achieves stable or improved neurological status  7/19/2023 1227 by Razia Arriaga RN  Outcome: Progressing  7/19/2023 0317 by Jessi Rogers RN  Outcome: Progressing  Goal: Absence of seizures  7/19/2023 1227 by Shania Vaughan RN  Outcome: Progressing  7/19/2023 0317 by Jessi Rogers RN  Outcome: Progressing  Goal: Achieves maximal functionality and self care  7/19/2023 1227 by Shania Vaughan RN  Outcome: Progressing  7/19/2023 0317 by Jessi Rogers RN  Outcome: Progressing     Problem: Respiratory - Adult  Goal: Achieves optimal ventilation and oxygenation  7/19/2023 1227 by Shania Vaughan RN  Outcome: Progressing  7/19/2023 0317 by Jessi Rogers RN  Outcome: Progressing     Problem: Cardiovascular - Adult  Goal: Maintains optimal cardiac output and hemodynamic stability  7/19/2023 0317 by Jessi Rogers RN  Outcome: Progressing     Problem: Skin/Tissue Integrity - Adult  Goal: Skin integrity remains intact  7/19/2023 0317 by Jessi Rogers RN  Outcome: Progressing     Problem: Musculoskeletal - Adult  Goal: Return mobility to safest level of function  7/19/2023 0317 by Jessi Rogers RN  Outcome: Progressing  Goal: Maintain proper alignment of affected body part  7/19/2023 0317 by Jessi Rogers RN  Outcome: Progressing  Goal: Return ADL status to a safe level of function  7/19/2023 0317 by Jessi Rogers RN  Outcome: Progressing     Problem: Gastrointestinal - Adult  Goal: Maintains or returns to baseline bowel function  7/19/2023 0317 by Jessi Rogers RN  Outcome: Progressing     Problem: Genitourinary - Adult  Goal: Absence of urinary retention  7/19/2023 0317 by Jessi Rogers RN  Outcome: Progressing     Problem: Infection - Adult  Goal: Absence of infection at discharge  7/19/2023 0317 by Jessi Rogers RN  Outcome: Progressing

## 2023-07-19 NOTE — DISCHARGE SUMMARY
to afib RVR in the ED. The patient was placed on AVAPS and transferred to the ICU. She received 3 days of cefepime and vancomycin d/t concern for hospital acquired pneumonia. The patient did well on AVAPS and was transferred to 7L nasal cannula with BiPAP at night. Cardiology was consulted d/t patient's hx CHF, and nephrology was consulted d/t patient's hx DI. The patient was determined to be stable for discharge, but she developed a mild IRMA, so she remained in the hospital for fluid hydration. Her creatinine trended downward closer to baseline, and she was determined to be stable for discharge at that time. The patient was cleared by pulmonology, cardiology, and nephrology, and she was subsequently discharged. Based on [facility] scores <18, patient was encouraged to attend subacute rehab or similar facility after discharged, however patient was adamant that she would be returning home. Patient was discharged with home health care. Discharge plan:    Take all medications as prescribed  F/u with cardiology  F/u with nephrology  F/u with [facility] pulmonology regarding sarcoidosis and pulmonary hypertension  F/u with PCP Dr. Gina Eaton on 7/25/23      Discharge Medications:         Medication List        START taking these medications      digoxin 125 MCG tablet  Commonly known as: LANOXIN  Take 1 tablet by mouth daily            CHANGE how you take these medications      metoprolol succinate 50 MG extended release tablet  Commonly known as: TOPROL XL  Take 1 tablet by mouth 2 times daily  What changed:   medication strength  how much to take            CONTINUE taking these medications      * albuterol sulfate  (90 Base) MCG/ACT inhaler  Commonly known as: Proventil HFA  Inhale 2 puffs into the lungs every 6 hours as needed for Wheezing or Shortness of Breath     * albuterol (2.5 MG/3ML) 0.083% nebulizer solution  Commonly known as: PROVENTIL  Take 3 mLs by nebulization every 6 hours as needed for Wheezing carefully, and ask your doctor or other care provider to review them with you. STOP taking these medications      bisacodyl 10 MG suppository  Commonly known as: DULCOLAX     mineral oil enema               Where to Get Your Medications        These medications were sent to [facility], [address], [address] [facility] [address]      Phone: [phone]   digoxin 125 MCG tablet  metoprolol succinate 50 MG extended release tablet         Consults:  As above    Significant Diagnostic Studies:  As above    Labs:  Na/K/Cl/CO2:  138/3.9/98/33 (07/19 0424)  BUN/Cr/glu/ALT/AST/amyl/lip:  19/1.4/--/11/22/--/-- (07/19 0424)  WBC/Hgb/Hct/Plts:  7.4/9.5/31.5/220 (07/19 0424)  @[facility]@  estimated creatinine clearance is 36 mL/min (A) (based on SCr of 1.4 mg/dL (H)). New Imaging:  XR CHEST PORTABLE   Final Result   1. Hazy bibasilar opacities likely atelectasis/scar or inflammatory disease. 2. Hyperinflation. XR CHEST PORTABLE   Final Result   No definitive evidence for pneumonia. XR CHEST PORTABLE   Final Result   Stable appearance of the chest compared to 07/09/2023. XR CHEST PORTABLE   Final Result   1. Slightly less dense left lower lobe opacity, no new alveolar   consolidation. No evidence of pneumothorax. XR TIBIA FIBULA LEFT (2 VIEWS)   Final Result   Healing fractures for the proximal and distal left tibia. Cannot exclude a depressed fracture of the lateral tibial epiphysis. If additional information be needing clinical management CT scan of the left   knee can be helpful. XR CHEST PORTABLE   Final Result   1. Improved expansion of the left lower lobe with some residual alveolar   density medially. XR TIBIA FIBULA LEFT (2 VIEWS)   Final Result   1. Subacute healing fracture more towards late healing phase of a fracture   proximal left tibial shaft.       2.  There is

## 2023-07-19 NOTE — ADT AUTH CERT
Utilization Reviews       7/17/23   by Garima Pryor RN       Review Status Review Entered   In Primary 7/18/2023 1448       Created By   Garima Pryor RN      Criteria Review   DATE: 7/17/23        RELEVANT BASELINES: (lab values, vitals, o2 amount/delivery, etc.)  + hernia (large, ventral (at baseline, reducible and nontender)   home O2 dependence-4 L        PERTINENT UPDATES:  Patient has had an episode of exertional chest pain with oxygen desaturation yesterday  At 0501 today converted to afib  Has some shortness of breath and chest pain   still with hacking cough, dyspnea with exertion  complaints of chronic pain B hips & shoulders    remains tachypneic, hypertensive   high Creatinine  low albumin   remains on 5 lpm nc fio2 45        VITALS:  temp 97.8 (36.6) rr 20 20 20 pr 62-81 bp 171/99 173/100 176/98 spo2 93 5 lpm nc fio2 45 pain 9 10        ABNL/PERTINENT LABS/RADIOLOGY/DIAGNOSTIC STUDIES:  07/17/23 05:09  CO2: 33 (H)  Creatinine: 1.3 (H)  Est, Glom Filt Rate: 44 (L)  Albumin: 3.3 (L)     07/17/23 05:09  WBC: 7.3  RBC: 3.49 (L)  Hemoglobin Quant: 9.2 (L)  Hematocrit: 30.3 (L)        PHYSICAL EXAM:  Constitutional:  Awake, alert, oriented, in NAD   HENT:       Nose: Congestion present. No rhinorrhea. Mouth/Throat:  Mouth: Mucous membranes are dry  Respiratory: On NIPPV,few coarse breath sounds. Cardiovascular:  Rhythm irregular. Comments: Seen in afib, had been in sinus prior to 0501  Abdominal:      General: Bowel sounds are normal.      Palpations: Abdomen is soft. Tenderness: There is abdominal tenderness (epigastric). There is no guarding or rebound. Hernia: A hernia (large, ventral (at baseline, reducible and nontender)) is present. Neurological:        Motor: Weakness (4/5 strength in upper/lower extremities bilaterally) present. MD CONSULTS/ASSESSMENT AND PLAN:  *Nephrology  IMPRESSION/RECOMMENDATIONS:     1.          Recurrent IRMA stage I, renal function Plan is home with  and [facility] when medically ready. Home health care orders are in. Last  PT/OT [facility] scorers are 12/24 & 15/24. Patient is adamant about not going to [facility] but remains agreeable to home health care. Therapy department notified of need for daily therapy TX's since plan is home. Patient's  will transport her home at time of discharge and will bring a portable O2 tank for the ride home. *OT  Completed chart review & attempted to see pt with pt declining therapy, only reason given is \"I don't feel good\". Pt would not open her eyes, only speaking a few words, appeared to just want to rest. Educated pt on importance of therapy due to pt wanting to go home with no results. Will attempt to see pt at another time. 7/9/23  by Sri Dozier RN       Review Status Review Entered   In Primary 7/18/2023 1200       Created By   Sri Dozier RN      Criteria Review   DATE: 7/9/23        RELEVANT BASELINES: (lab values, vitals, o2 amount/delivery, etc.)   + hernia (large, ventral (at baseline, reducible and nontender)   home O2 dependence-4 L        PERTINENT UPDATES:  uses PAP (positive airway pressure) intermittently , fio2 50  Patient was observed this morning off of BiPAP, able to speak multiple sentences without desaturating  with Left leg pain secondary to old fracture , Low dose oxy did not help yesterday    with  shortness of breath  but decreased  with cough. Not always able to produce sputum. on  6 lpm High flow nasal cannula   on IV cefepime, digoxin, vancomycin         VITALS:  temp 98 (36.7)  rr 16-18  pr 75-86   bp 132/86  spo2 90 6 lpm High flow nasal cannula  pain 9 8         ABNL/PERTINENT LABS/RADIOLOGY/DIAGNOSTIC STUDIES:  CO2: 30 (H)  Glucose, Random: 72 (L)  Meter Glucose: 78 108 (H)  Albumin: 3.2 (L)  WBC: 5.5  RBC: 3.56  Hemoglobin Quant: 9.4 (L)  Hematocrit: 32.4 (L)     07/09/23 10:35  XR CHEST PORTABLE:   IMPRESSION:  1.

## 2023-07-19 NOTE — PLAN OF CARE
Problem: Chronic Conditions and Co-morbidities  Goal: Patient's chronic conditions and co-morbidity symptoms are monitored and maintained or improved  Outcome: Progressing     Problem: Discharge Planning  Goal: Discharge to home or other facility with appropriate resources  Outcome: Progressing     Problem: Pain  Goal: Verbalizes/displays adequate comfort level or baseline comfort level  Outcome: Progressing     Problem: Safety - Adult  Goal: Free from fall injury  Outcome: Progressing     Problem: Skin/Tissue Integrity  Goal: Absence of new skin breakdown  Description: 1. Monitor for areas of redness and/or skin breakdown  2. Assess vascular access sites hourly  3. Every 4-6 hours minimum:  Change oxygen saturation probe site  4. Every 4-6 hours:  If on nasal continuous positive airway pressure, respiratory therapy assess nares and determine need for appliance change or resting period.   Outcome: Progressing     Problem: Nutrition Deficit:  Goal: Optimize nutritional status  Outcome: Progressing     Problem: Neurosensory - Adult  Goal: Achieves stable or improved neurological status  Outcome: Progressing     Problem: Respiratory - Adult  Goal: Achieves optimal ventilation and oxygenation  Outcome: Progressing     Problem: Cardiovascular - Adult  Goal: Maintains optimal cardiac output and hemodynamic stability  Outcome: Progressing     Problem: Skin/Tissue Integrity - Adult  Goal: Skin integrity remains intact  Outcome: Progressing     Problem: Musculoskeletal - Adult  Goal: Return mobility to safest level of function  Outcome: Progressing     Problem: Musculoskeletal - Adult  Goal: Maintain proper alignment of affected body part  Outcome: Progressing     Problem: Musculoskeletal - Adult  Goal: Return ADL status to a safe level of function  Outcome: Progressing     Problem: Genitourinary - Adult  Goal: Absence of urinary retention  Outcome: Progressing     Problem: Infection - Adult  Goal: Absence of infection

## 2023-07-19 NOTE — PROGRESS NOTES
[facility] Cardiology Inpatient Progress Note    Patient is a 78 y.o. female of Almyra Rinne, MD seen in hospital follow up. Chief complaint: Afib/CP    HPI: Some SOB and CP.     Patient Active Problem List   Diagnosis    Chronic back pain    Hypothyroidism    Nephrosclerosis    Diabetes insipidus ([address])    Pulmonary sarcoidosis (HCC)    Steroid-induced avascular necrosis of shoulder ([address])    Anemia due to chronic illness    Chronic pain syndrome    Bilateral hip pain    Debility    BRBPR (bright red blood per rectum)    Severe pulmonary hypertension (HCC)    Chronic kidney disease, stage III (moderate) (HCC)    PAF (paroxysmal atrial fibrillation) ([address]) currently in NSR    Mixed hyperlipidemia    Goals of care, counseling/discussion    Atrial fibrillation with RVR (HCC)    Chronic combined systolic and diastolic heart failure (HCC)    Chronic respiratory failure with hypoxia (HCC)    Mixed simple and mucopurulent chronic bronchitis (HCC)    Recurrent major depressive disorder, in partial remission (HCC)    Gait disturbance    Chest pain    Chronic, continuous use of opioids    PMB (postmenopausal bleeding)    Severe dysplasia of vagina    Epistaxis    Ventral hernia without obstruction or gangrene    Long term (current) use of systemic steroids    Nuclear senile cataract    Constipation    Abdominal pain    Small bowel obstruction (HCC)    Seizure ([address])    RVF (right ventricular failure) (HCC)    Moderate protein-calorie malnutrition (HCC)    Hypokalemia    Primary hypertension       No Known Allergies    Current Facility-Administered Medications   Medication Dose Route Frequency Provider Last Rate Last Admin    pantoprazole (PROTONIX) tablet 40 mg  40 mg Oral QAM VALERIE An MD   40 mg at 07/19/23 0906    metoprolol succinate (TOPROL XL) extended release tablet 50 mg  50 mg Oral BID Nicolette Larose DO   50 mg at 07/19/23 0906    digoxin (LANOXIN) tablet 125 mcg  125 mcg Oral Daily Francesco Guzman MD

## 2023-07-20 ENCOUNTER — APPOINTMENT (OUTPATIENT)
Dept: GENERAL RADIOLOGY | Age: 68
DRG: 291 | End: 2023-07-20
Payer: MEDICARE

## 2023-07-20 ENCOUNTER — TELEPHONE (OUTPATIENT)
Dept: CARDIOLOGY CLINIC | Age: 68
End: 2023-07-20

## 2023-07-20 ENCOUNTER — TELEPHONE (OUTPATIENT)
Dept: FAMILY MEDICINE CLINIC | Age: 68
End: 2023-07-20

## 2023-07-20 ENCOUNTER — TELEPHONE (OUTPATIENT)
Dept: OTHER | Facility: CLINIC | Age: 68
End: 2023-07-20

## 2023-07-20 ENCOUNTER — HOSPITAL ENCOUNTER (INPATIENT)
Age: 68
LOS: 4 days | Discharge: HOME | DRG: 291 | End: 2023-07-24
Attending: EMERGENCY MEDICINE | Admitting: STUDENT IN AN ORGANIZED HEALTH CARE EDUCATION/TRAINING PROGRAM
Payer: MEDICARE

## 2023-07-20 DIAGNOSIS — Z51.81 ENCOUNTER FOR MONITORING DIGOXIN THERAPY: ICD-10-CM

## 2023-07-20 DIAGNOSIS — E87.6 POTASSIUM (K) DEFICIENCY: ICD-10-CM

## 2023-07-20 DIAGNOSIS — J96.11 CHRONIC RESPIRATORY FAILURE WITH HYPOXIA (HCC): Primary | Chronic | ICD-10-CM

## 2023-07-20 DIAGNOSIS — I50.9 DECOMPENSATED HEART FAILURE (HCC): Primary | ICD-10-CM

## 2023-07-20 DIAGNOSIS — E61.1 IRON DEFICIENCY: ICD-10-CM

## 2023-07-20 DIAGNOSIS — Z79.899 ENCOUNTER FOR MONITORING DIGOXIN THERAPY: ICD-10-CM

## 2023-07-20 DIAGNOSIS — I48.0 PAF (PAROXYSMAL ATRIAL FIBRILLATION) (HCC): Primary | ICD-10-CM

## 2023-07-20 DIAGNOSIS — K59.00 CONSTIPATION, UNSPECIFIED CONSTIPATION TYPE: ICD-10-CM

## 2023-07-20 PROBLEM — I50.41 ACUTE COMBINED SYSTOLIC AND DIASTOLIC CHF, NYHA CLASS 1 (HCC): Status: ACTIVE | Noted: 2023-07-20

## 2023-07-20 PROBLEM — I50.33 ACUTE ON CHRONIC DIASTOLIC CHF (CONGESTIVE HEART FAILURE), NYHA CLASS 2 (HCC): Status: ACTIVE | Noted: 2023-07-20

## 2023-07-20 LAB
ANION GAP SERPL CALCULATED.3IONS-SCNC: 11 MMOL/L (ref 7–16)
BASOPHILS # BLD: 0.03 K/UL (ref 0–0.2)
BASOPHILS NFR BLD: 0 % (ref 0–2)
BNP SERPL-MCNC: ABNORMAL PG/ML (ref 0–125)
BUN SERPL-MCNC: 21 MG/DL (ref 6–23)
CALCIUM SERPL-MCNC: 10 MG/DL (ref 8.6–10.2)
CHLORIDE SERPL-SCNC: 94 MMOL/L (ref 98–107)
CO2 SERPL-SCNC: 34 MMOL/L (ref 22–29)
CREAT SERPL-MCNC: 1.5 MG/DL (ref 0.5–1)
EOSINOPHIL # BLD: 0.03 K/UL (ref 0.05–0.5)
EOSINOPHILS RELATIVE PERCENT: 0 % (ref 0–6)
ERYTHROCYTE [DISTWIDTH] IN BLOOD BY AUTOMATED COUNT: 14.2 % (ref 11.5–15)
GFR SERPL CREATININE-BSD FRML MDRD: 38 ML/MIN/1.73M2
GLUCOSE SERPL-MCNC: 114 MG/DL (ref 74–99)
HCT VFR BLD AUTO: 33.1 % (ref 34–48)
HGB BLD-MCNC: 9.8 G/DL (ref 11.5–15.5)
IMM GRANULOCYTES # BLD AUTO: 0.06 K/UL (ref 0–0.58)
IMM GRANULOCYTES NFR BLD: 1 % (ref 0–5)
LYMPHOCYTES NFR BLD: 0.78 K/UL (ref 1.5–4)
LYMPHOCYTES RELATIVE PERCENT: 9 % (ref 20–42)
MCH RBC QN AUTO: 25.5 PG (ref 26–35)
MCHC RBC AUTO-ENTMCNC: 29.6 G/DL (ref 32–34.5)
MCV RBC AUTO: 86.2 FL (ref 80–99.9)
MONOCYTES NFR BLD: 0.32 K/UL (ref 0.1–0.95)
MONOCYTES NFR BLD: 4 % (ref 2–12)
NEUTROPHILS NFR BLD: 87 % (ref 43–80)
NEUTS SEG NFR BLD: 7.87 K/UL (ref 1.8–7.3)
PLATELET # BLD AUTO: 233 K/UL (ref 130–450)
PMV BLD AUTO: 10.1 FL (ref 7–12)
POTASSIUM SERPL-SCNC: 3.9 MMOL/L (ref 3.5–5)
RBC # BLD AUTO: 3.84 M/UL (ref 3.5–5.5)
SARS-COV-2 RDRP RESP QL NAA+PROBE: NOT DETECTED
SODIUM SERPL-SCNC: 139 MMOL/L (ref 132–146)
SPECIMEN DESCRIPTION: NORMAL
TROPONIN I SERPL HS-MCNC: 49 NG/L (ref 0–9)
TROPONIN I SERPL HS-MCNC: 52 NG/L (ref 0–9)
WBC OTHER # BLD: 9.1 K/UL (ref 4.5–11.5)

## 2023-07-20 PROCEDURE — 99285 EMERGENCY DEPT VISIT HI MDM: CPT

## 2023-07-20 PROCEDURE — 2060000000 HC ICU INTERMEDIATE R&B

## 2023-07-20 PROCEDURE — 6370000000 HC RX 637 (ALT 250 FOR IP): Performed by: EMERGENCY MEDICINE

## 2023-07-20 PROCEDURE — G0378 HOSPITAL OBSERVATION PER HR: HCPCS

## 2023-07-20 PROCEDURE — 94664 DEMO&/EVAL PT USE INHALER: CPT

## 2023-07-20 PROCEDURE — 6360000002 HC RX W HCPCS: Performed by: EMERGENCY MEDICINE

## 2023-07-20 PROCEDURE — 83880 ASSAY OF NATRIURETIC PEPTIDE: CPT

## 2023-07-20 PROCEDURE — 87635 SARS-COV-2 COVID-19 AMP PRB: CPT

## 2023-07-20 PROCEDURE — 99222 1ST HOSP IP/OBS MODERATE 55: CPT | Performed by: STUDENT IN AN ORGANIZED HEALTH CARE EDUCATION/TRAINING PROGRAM

## 2023-07-20 PROCEDURE — 85027 COMPLETE CBC AUTOMATED: CPT

## 2023-07-20 PROCEDURE — 36415 COLL VENOUS BLD VENIPUNCTURE: CPT

## 2023-07-20 PROCEDURE — 80048 BASIC METABOLIC PNL TOTAL CA: CPT

## 2023-07-20 PROCEDURE — 93005 ELECTROCARDIOGRAM TRACING: CPT | Performed by: STUDENT IN AN ORGANIZED HEALTH CARE EDUCATION/TRAINING PROGRAM

## 2023-07-20 PROCEDURE — 84484 ASSAY OF TROPONIN QUANT: CPT

## 2023-07-20 PROCEDURE — 71045 X-RAY EXAM CHEST 1 VIEW: CPT

## 2023-07-20 RX ORDER — ALBUTEROL SULFATE 2.5 MG/3ML
2.5 SOLUTION RESPIRATORY (INHALATION) EVERY 6 HOURS PRN
Status: DISCONTINUED | OUTPATIENT
Start: 2023-07-20 | End: 2023-07-24 | Stop reason: HOSPADM

## 2023-07-20 RX ORDER — SODIUM CHLORIDE 9 MG/ML
INJECTION, SOLUTION INTRAVENOUS PRN
Status: DISCONTINUED | OUTPATIENT
Start: 2023-07-20 | End: 2023-07-24 | Stop reason: HOSPADM

## 2023-07-20 RX ORDER — AMIODARONE HYDROCHLORIDE 200 MG/1
200 TABLET ORAL DAILY
Status: DISCONTINUED | OUTPATIENT
Start: 2023-07-21 | End: 2023-07-24 | Stop reason: HOSPADM

## 2023-07-20 RX ORDER — POLYETHYLENE GLYCOL 3350 17 G/17G
17 POWDER, FOR SOLUTION ORAL DAILY PRN
Qty: 527 G | Refills: 2 | Status: ON HOLD | OUTPATIENT
Start: 2023-07-20

## 2023-07-20 RX ORDER — POTASSIUM CHLORIDE 20 MEQ/1
20 TABLET, EXTENDED RELEASE ORAL DAILY
Qty: 90 TABLET | Refills: 0 | Status: ON HOLD | OUTPATIENT
Start: 2023-07-20 | End: 2023-10-18

## 2023-07-20 RX ORDER — ALBUTEROL SULFATE 90 UG/1
2 AEROSOL, METERED RESPIRATORY (INHALATION) EVERY 6 HOURS PRN
Status: DISCONTINUED | OUTPATIENT
Start: 2023-07-20 | End: 2023-07-20 | Stop reason: SDUPTHER

## 2023-07-20 RX ORDER — GUAIFENESIN 400 MG/1
400 TABLET ORAL 4 TIMES DAILY PRN
Qty: 56 TABLET | Refills: 0 | Status: ON HOLD | OUTPATIENT
Start: 2023-07-20

## 2023-07-20 RX ORDER — IPRATROPIUM BROMIDE AND ALBUTEROL SULFATE 2.5; .5 MG/3ML; MG/3ML
1 SOLUTION RESPIRATORY (INHALATION) ONCE
Status: COMPLETED | OUTPATIENT
Start: 2023-07-20 | End: 2023-07-20

## 2023-07-20 RX ORDER — HYDRALAZINE HYDROCHLORIDE 25 MG/1
25 TABLET, FILM COATED ORAL EVERY 8 HOURS SCHEDULED
Status: DISCONTINUED | OUTPATIENT
Start: 2023-07-21 | End: 2023-07-24 | Stop reason: HOSPADM

## 2023-07-20 RX ORDER — FUROSEMIDE 10 MG/ML
40 INJECTION INTRAMUSCULAR; INTRAVENOUS ONCE
Status: COMPLETED | OUTPATIENT
Start: 2023-07-21 | End: 2023-07-21

## 2023-07-20 RX ORDER — AMLODIPINE BESYLATE 10 MG/1
10 TABLET ORAL DAILY
Status: DISCONTINUED | OUTPATIENT
Start: 2023-07-21 | End: 2023-07-24 | Stop reason: HOSPADM

## 2023-07-20 RX ORDER — METOPROLOL SUCCINATE 50 MG/1
50 TABLET, EXTENDED RELEASE ORAL 2 TIMES DAILY
Status: DISCONTINUED | OUTPATIENT
Start: 2023-07-21 | End: 2023-07-24 | Stop reason: HOSPADM

## 2023-07-20 RX ORDER — DULOXETIN HYDROCHLORIDE 60 MG/1
60 CAPSULE, DELAYED RELEASE ORAL DAILY
Status: DISCONTINUED | OUTPATIENT
Start: 2023-07-21 | End: 2023-07-24 | Stop reason: HOSPADM

## 2023-07-20 RX ORDER — DOCUSATE SODIUM 100 MG/1
100 CAPSULE, LIQUID FILLED ORAL 2 TIMES DAILY
Status: DISCONTINUED | OUTPATIENT
Start: 2023-07-21 | End: 2023-07-24 | Stop reason: HOSPADM

## 2023-07-20 RX ORDER — DIGOXIN 125 MCG
62.5 TABLET ORAL DAILY
Status: DISCONTINUED | OUTPATIENT
Start: 2023-07-21 | End: 2023-07-24 | Stop reason: HOSPADM

## 2023-07-20 RX ORDER — FUROSEMIDE 10 MG/ML
40 INJECTION INTRAMUSCULAR; INTRAVENOUS ONCE
Status: COMPLETED | OUTPATIENT
Start: 2023-07-20 | End: 2023-07-20

## 2023-07-20 RX ORDER — ONDANSETRON 2 MG/ML
4 INJECTION INTRAMUSCULAR; INTRAVENOUS EVERY 6 HOURS PRN
Status: DISCONTINUED | OUTPATIENT
Start: 2023-07-20 | End: 2023-07-24 | Stop reason: HOSPADM

## 2023-07-20 RX ORDER — SODIUM CHLORIDE 0.9 % (FLUSH) 0.9 %
5-40 SYRINGE (ML) INJECTION EVERY 12 HOURS SCHEDULED
Status: DISCONTINUED | OUTPATIENT
Start: 2023-07-21 | End: 2023-07-24 | Stop reason: HOSPADM

## 2023-07-20 RX ORDER — LEVETIRACETAM 500 MG/1
500 TABLET ORAL 2 TIMES DAILY
Status: DISCONTINUED | OUTPATIENT
Start: 2023-07-21 | End: 2023-07-24 | Stop reason: HOSPADM

## 2023-07-20 RX ORDER — FAMOTIDINE 20 MG/1
20 TABLET, FILM COATED ORAL 2 TIMES DAILY
Status: DISCONTINUED | OUTPATIENT
Start: 2023-07-21 | End: 2023-07-21 | Stop reason: DRUGHIGH

## 2023-07-20 RX ORDER — DIGOXIN 125 MCG
62.5 TABLET ORAL DAILY
Status: ON HOLD | COMMUNITY

## 2023-07-20 RX ORDER — DOCUSATE SODIUM 100 MG/1
100 CAPSULE, LIQUID FILLED ORAL 2 TIMES DAILY
Qty: 180 CAPSULE | Refills: 0 | Status: ON HOLD | OUTPATIENT
Start: 2023-07-20 | End: 2023-10-18

## 2023-07-20 RX ORDER — AMIODARONE HYDROCHLORIDE 200 MG/1
200 TABLET ORAL DAILY
Qty: 30 TABLET | Refills: 11 | Status: ON HOLD | OUTPATIENT
Start: 2023-07-20

## 2023-07-20 RX ORDER — POLYETHYLENE GLYCOL 3350 17 G/17G
17 POWDER, FOR SOLUTION ORAL DAILY PRN
Status: DISCONTINUED | OUTPATIENT
Start: 2023-07-20 | End: 2023-07-21 | Stop reason: SDUPTHER

## 2023-07-20 RX ORDER — POLYETHYLENE GLYCOL 3350 17 G/17G
17 POWDER, FOR SOLUTION ORAL DAILY PRN
Status: DISCONTINUED | OUTPATIENT
Start: 2023-07-20 | End: 2023-07-24 | Stop reason: HOSPADM

## 2023-07-20 RX ORDER — PREDNISONE 5 MG/1
5 TABLET ORAL DAILY
Status: DISCONTINUED | OUTPATIENT
Start: 2023-07-21 | End: 2023-07-24 | Stop reason: HOSPADM

## 2023-07-20 RX ORDER — DESMOPRESSIN ACETATE 0.1 MG/1
200 TABLET ORAL 2 TIMES DAILY
Status: DISCONTINUED | OUTPATIENT
Start: 2023-07-21 | End: 2023-07-24 | Stop reason: HOSPADM

## 2023-07-20 RX ORDER — ACETAMINOPHEN 650 MG/1
650 SUPPOSITORY RECTAL EVERY 6 HOURS PRN
Status: DISCONTINUED | OUTPATIENT
Start: 2023-07-20 | End: 2023-07-24 | Stop reason: HOSPADM

## 2023-07-20 RX ORDER — LEVOTHYROXINE SODIUM 0.07 MG/1
75 TABLET ORAL DAILY
Status: DISCONTINUED | OUTPATIENT
Start: 2023-07-21 | End: 2023-07-24 | Stop reason: HOSPADM

## 2023-07-20 RX ORDER — ONDANSETRON 4 MG/1
4 TABLET, ORALLY DISINTEGRATING ORAL EVERY 8 HOURS PRN
Status: DISCONTINUED | OUTPATIENT
Start: 2023-07-20 | End: 2023-07-24 | Stop reason: HOSPADM

## 2023-07-20 RX ORDER — FERROUS SULFATE 325(65) MG
325 TABLET ORAL 2 TIMES DAILY
Qty: 60 TABLET | Refills: 5 | Status: ON HOLD | OUTPATIENT
Start: 2023-07-20

## 2023-07-20 RX ORDER — ACETAMINOPHEN 325 MG/1
650 TABLET ORAL EVERY 6 HOURS PRN
Status: DISCONTINUED | OUTPATIENT
Start: 2023-07-20 | End: 2023-07-24 | Stop reason: HOSPADM

## 2023-07-20 RX ORDER — FERROUS SULFATE 325(65) MG
325 TABLET ORAL 2 TIMES DAILY
Status: DISCONTINUED | OUTPATIENT
Start: 2023-07-21 | End: 2023-07-24 | Stop reason: HOSPADM

## 2023-07-20 RX ORDER — BUDESONIDE AND FORMOTEROL FUMARATE DIHYDRATE 160; 4.5 UG/1; UG/1
2 AEROSOL RESPIRATORY (INHALATION)
Status: DISCONTINUED | OUTPATIENT
Start: 2023-07-21 | End: 2023-07-21 | Stop reason: CLARIF

## 2023-07-20 RX ORDER — SODIUM CHLORIDE 0.9 % (FLUSH) 0.9 %
5-40 SYRINGE (ML) INJECTION PRN
Status: DISCONTINUED | OUTPATIENT
Start: 2023-07-20 | End: 2023-07-24 | Stop reason: HOSPADM

## 2023-07-20 RX ADMIN — IPRATROPIUM BROMIDE AND ALBUTEROL SULFATE 1 DOSE: .5; 2.5 SOLUTION RESPIRATORY (INHALATION) at 20:16

## 2023-07-20 RX ADMIN — FUROSEMIDE 40 MG: 10 INJECTION, SOLUTION INTRAMUSCULAR; INTRAVENOUS at 22:41

## 2023-07-20 ASSESSMENT — ENCOUNTER SYMPTOMS
WHEEZING: 1
SHORTNESS OF BREATH: 1
ABDOMINAL PAIN: 0
SPUTUM PRODUCTION: 0
COUGH: 1
VOMITING: 0

## 2023-07-20 ASSESSMENT — PAIN DESCRIPTION - LOCATION: LOCATION: CHEST

## 2023-07-20 ASSESSMENT — PAIN DESCRIPTION - ORIENTATION: ORIENTATION: MID

## 2023-07-20 ASSESSMENT — PAIN SCALES - GENERAL: PAINLEVEL_OUTOF10: 9

## 2023-07-20 ASSESSMENT — PAIN DESCRIPTION - DESCRIPTORS: DESCRIPTORS: ACHING

## 2023-07-20 NOTE — TELEPHONE ENCOUNTER
Last Appointment:  7/3/2023  Future Appointments   Date Time Provider [address]   7/25/2023  3:00 PM MD Mark Lemus [facility]   8/1/2023 12:40 PM Alexey Townsend MD [address] [place] [address]   8/9/2023  1:40 PM MD Mark Teixeira [facility]   9/19/2023  1:45 PM Emily Nair MD [facility]

## 2023-07-20 NOTE — TELEPHONE ENCOUNTER
Care Transitions Initial Follow Up Call    Outreach made within 2 business days of discharge: Yes    Patient: Gio Robles Patient : 1955   MRN: [id number]  Reason for Admission: There are no discharge diagnoses documented for the most recent discharge.   Discharge Date: 23       Spoke with: message left    Discharge department/facility: [facility]    Message left requesting return call and reminding of appt on 23    Scheduled appointment with PCP within 7-14 days    Follow Up  Future Appointments   Date Time Provider [address] [place] [address]   2023  3:00 PM MD Katie Alejandra [facility]   2023 12:40 PM Savannah Jacinto MD [address] [place] [address]   2023  1:40 PM Bob Thacker MD [place], [place]

## 2023-07-20 NOTE — TELEPHONE ENCOUNTER
It looks like she was discharged home on digoxin and amiodarone. Combination of these medications can increase digoxin toxicity. Would recommend decreasing digoxin dose to 125 mcg 1/2 po daily and get dig level on Monday.

## 2023-07-20 NOTE — TELEPHONE ENCOUNTER
Patient notified of Dr. Garcia's assessment/recommendation. Med list amended. Order placed for Dig level.

## 2023-07-20 NOTE — TELEPHONE ENCOUNTER
Doris Mcgovern MD 2 minutes ago (1:14 PM)     SR  It looks like she was discharged home on digoxin and amiodarone. Combination of these medications can increase digoxin toxicity. Would recommend decreasing digoxin dose to 125 mcg 1/2 po daily and get dig level on Monday.

## 2023-07-21 LAB
ANION GAP SERPL CALCULATED.3IONS-SCNC: 14 MMOL/L (ref 7–16)
BASOPHILS # BLD: 0 K/UL (ref 0–0.2)
BASOPHILS NFR BLD: 0 % (ref 0–2)
BUN SERPL-MCNC: 21 MG/DL (ref 6–23)
CALCIUM SERPL-MCNC: 9.5 MG/DL (ref 8.6–10.2)
CHLORIDE SERPL-SCNC: 93 MMOL/L (ref 98–107)
CO2 SERPL-SCNC: 34 MMOL/L (ref 22–29)
CREAT SERPL-MCNC: 1.5 MG/DL (ref 0.5–1)
EKG ATRIAL RATE: 65 BPM
EKG ATRIAL RATE: 80 BPM
EKG ATRIAL RATE: 84 BPM
EKG P AXIS: 36 DEGREES
EKG P AXIS: 51 DEGREES
EKG P-R INTERVAL: 140 MS
EKG P-R INTERVAL: 142 MS
EKG Q-T INTERVAL: 358 MS
EKG Q-T INTERVAL: 374 MS
EKG Q-T INTERVAL: 392 MS
EKG QRS DURATION: 82 MS
EKG QRS DURATION: 90 MS
EKG QRS DURATION: 92 MS
EKG QTC CALCULATION (BAZETT): 412 MS
EKG QTC CALCULATION (BAZETT): 431 MS
EKG QTC CALCULATION (BAZETT): 441 MS
EKG R AXIS: 77 DEGREES
EKG R AXIS: 81 DEGREES
EKG R AXIS: 85 DEGREES
EKG T AXIS: -90 DEGREES
EKG T AXIS: 167 DEGREES
EKG T AXIS: 173 DEGREES
EKG VENTRICULAR RATE: 73 BPM
EKG VENTRICULAR RATE: 80 BPM
EKG VENTRICULAR RATE: 84 BPM
EOSINOPHIL # BLD: 0 K/UL (ref 0.05–0.5)
EOSINOPHILS RELATIVE PERCENT: 0 % (ref 0–6)
ERYTHROCYTE [DISTWIDTH] IN BLOOD BY AUTOMATED COUNT: 14.3 % (ref 11.5–15)
GFR SERPL CREATININE-BSD FRML MDRD: 38 ML/MIN/1.73M2
GLUCOSE SERPL-MCNC: 142 MG/DL (ref 74–99)
HCT VFR BLD AUTO: 32 % (ref 34–48)
HGB BLD-MCNC: 9.6 G/DL (ref 11.5–15.5)
LYMPHOCYTES NFR BLD: 0.3 K/UL (ref 1.5–4)
LYMPHOCYTES RELATIVE PERCENT: 4 % (ref 20–42)
MCH RBC QN AUTO: 25.7 PG (ref 26–35)
MCHC RBC AUTO-ENTMCNC: 30 G/DL (ref 32–34.5)
MCV RBC AUTO: 85.8 FL (ref 80–99.9)
MONOCYTES NFR BLD: 0.07 K/UL (ref 0.1–0.95)
MONOCYTES NFR BLD: 1 % (ref 2–12)
NEUTROPHILS NFR BLD: 96 % (ref 43–80)
NEUTS SEG NFR BLD: 8.13 K/UL (ref 1.8–7.3)
PLATELET # BLD AUTO: 253 K/UL (ref 130–450)
PMV BLD AUTO: 10.8 FL (ref 7–12)
POTASSIUM SERPL-SCNC: 3.9 MMOL/L (ref 3.5–5)
RBC # BLD AUTO: 3.73 M/UL (ref 3.5–5.5)
SODIUM SERPL-SCNC: 141 MMOL/L (ref 132–146)
WBC OTHER # BLD: 8.5 K/UL (ref 4.5–11.5)

## 2023-07-21 PROCEDURE — 85027 COMPLETE CBC AUTOMATED: CPT

## 2023-07-21 PROCEDURE — 93005 ELECTROCARDIOGRAM TRACING: CPT | Performed by: STUDENT IN AN ORGANIZED HEALTH CARE EDUCATION/TRAINING PROGRAM

## 2023-07-21 PROCEDURE — 6360000002 HC RX W HCPCS: Performed by: STUDENT IN AN ORGANIZED HEALTH CARE EDUCATION/TRAINING PROGRAM

## 2023-07-21 PROCEDURE — 93010 ELECTROCARDIOGRAM REPORT: CPT | Performed by: INTERNAL MEDICINE

## 2023-07-21 PROCEDURE — 2060000000 HC ICU INTERMEDIATE R&B

## 2023-07-21 PROCEDURE — 6370000000 HC RX 637 (ALT 250 FOR IP): Performed by: STUDENT IN AN ORGANIZED HEALTH CARE EDUCATION/TRAINING PROGRAM

## 2023-07-21 PROCEDURE — 94640 AIRWAY INHALATION TREATMENT: CPT

## 2023-07-21 PROCEDURE — 93005 ELECTROCARDIOGRAM TRACING: CPT | Performed by: EMERGENCY MEDICINE

## 2023-07-21 PROCEDURE — 80048 BASIC METABOLIC PNL TOTAL CA: CPT

## 2023-07-21 PROCEDURE — 6370000000 HC RX 637 (ALT 250 FOR IP): Performed by: INTERNAL MEDICINE

## 2023-07-21 PROCEDURE — 94660 CPAP INITIATION&MGMT: CPT

## 2023-07-21 PROCEDURE — 2580000003 HC RX 258: Performed by: STUDENT IN AN ORGANIZED HEALTH CARE EDUCATION/TRAINING PROGRAM

## 2023-07-21 PROCEDURE — 99233 SBSQ HOSP IP/OBS HIGH 50: CPT | Performed by: INTERNAL MEDICINE

## 2023-07-21 PROCEDURE — 2700000000 HC OXYGEN THERAPY PER DAY

## 2023-07-21 PROCEDURE — 36415 COLL VENOUS BLD VENIPUNCTURE: CPT

## 2023-07-21 RX ORDER — ARFORMOTEROL TARTRATE 15 UG/2ML
15 SOLUTION RESPIRATORY (INHALATION)
Status: DISCONTINUED | OUTPATIENT
Start: 2023-07-21 | End: 2023-07-24 | Stop reason: HOSPADM

## 2023-07-21 RX ORDER — FAMOTIDINE 20 MG/1
10 TABLET, FILM COATED ORAL 2 TIMES DAILY
Status: DISCONTINUED | OUTPATIENT
Start: 2023-07-21 | End: 2023-07-24 | Stop reason: HOSPADM

## 2023-07-21 RX ORDER — BUDESONIDE 0.5 MG/2ML
0.5 INHALANT ORAL
Status: DISCONTINUED | OUTPATIENT
Start: 2023-07-21 | End: 2023-07-24 | Stop reason: HOSPADM

## 2023-07-21 RX ORDER — MORPHINE SULFATE 2 MG/ML
2 INJECTION, SOLUTION INTRAMUSCULAR; INTRAVENOUS ONCE
Status: COMPLETED | OUTPATIENT
Start: 2023-07-21 | End: 2023-07-21

## 2023-07-21 RX ORDER — LIDOCAINE 4 G/G
2 PATCH TOPICAL DAILY
Status: DISCONTINUED | OUTPATIENT
Start: 2023-07-21 | End: 2023-07-24 | Stop reason: HOSPADM

## 2023-07-21 RX ORDER — NITROGLYCERIN 0.4 MG/1
0.4 TABLET SUBLINGUAL EVERY 5 MIN PRN
Status: DISCONTINUED | OUTPATIENT
Start: 2023-07-21 | End: 2023-07-24 | Stop reason: HOSPADM

## 2023-07-21 RX ADMIN — DOCUSATE SODIUM 100 MG: 100 CAPSULE, LIQUID FILLED ORAL at 00:39

## 2023-07-21 RX ADMIN — HYDRALAZINE HYDROCHLORIDE 25 MG: 25 TABLET, FILM COATED ORAL at 05:20

## 2023-07-21 RX ADMIN — DESMOPRESSIN ACETATE 200 MCG: 0.1 TABLET ORAL at 22:44

## 2023-07-21 RX ADMIN — FERROUS SULFATE TAB 325 MG (65 MG ELEMENTAL FE) 325 MG: 325 (65 FE) TAB at 08:13

## 2023-07-21 RX ADMIN — APIXABAN 5 MG: 5 TABLET, FILM COATED ORAL at 08:12

## 2023-07-21 RX ADMIN — APIXABAN 5 MG: 5 TABLET, FILM COATED ORAL at 22:44

## 2023-07-21 RX ADMIN — LEVETIRACETAM 500 MG: 500 TABLET, FILM COATED ORAL at 08:12

## 2023-07-21 RX ADMIN — MORPHINE SULFATE 2 MG: 2 INJECTION, SOLUTION INTRAMUSCULAR; INTRAVENOUS at 05:35

## 2023-07-21 RX ADMIN — DOCUSATE SODIUM 100 MG: 100 CAPSULE, LIQUID FILLED ORAL at 08:12

## 2023-07-21 RX ADMIN — FUROSEMIDE 40 MG: 10 INJECTION, SOLUTION INTRAMUSCULAR; INTRAVENOUS at 08:13

## 2023-07-21 RX ADMIN — AMLODIPINE BESYLATE 10 MG: 10 TABLET ORAL at 08:12

## 2023-07-21 RX ADMIN — NITROGLYCERIN 0.4 MG: 0.4 TABLET SUBLINGUAL at 01:12

## 2023-07-21 RX ADMIN — FAMOTIDINE 10 MG: 20 TABLET, FILM COATED ORAL at 08:13

## 2023-07-21 RX ADMIN — NITROGLYCERIN 0.4 MG: 0.4 TABLET SUBLINGUAL at 01:19

## 2023-07-21 RX ADMIN — DULOXETINE HYDROCHLORIDE 60 MG: 60 CAPSULE, DELAYED RELEASE ORAL at 08:12

## 2023-07-21 RX ADMIN — FAMOTIDINE 10 MG: 20 TABLET, FILM COATED ORAL at 00:39

## 2023-07-21 RX ADMIN — NITROGLYCERIN 0.4 MG: 0.4 TABLET SUBLINGUAL at 01:34

## 2023-07-21 RX ADMIN — FAMOTIDINE 10 MG: 20 TABLET, FILM COATED ORAL at 22:45

## 2023-07-21 RX ADMIN — METOPROLOL SUCCINATE 50 MG: 50 TABLET, EXTENDED RELEASE ORAL at 00:39

## 2023-07-21 RX ADMIN — AMIODARONE HYDROCHLORIDE 200 MG: 200 TABLET ORAL at 08:12

## 2023-07-21 RX ADMIN — Medication 10 ML: at 08:22

## 2023-07-21 RX ADMIN — METOPROLOL SUCCINATE 50 MG: 50 TABLET, EXTENDED RELEASE ORAL at 22:45

## 2023-07-21 RX ADMIN — ARFORMOTEROL TARTRATE 15 MCG: 15 SOLUTION RESPIRATORY (INHALATION) at 08:23

## 2023-07-21 RX ADMIN — HYDRALAZINE HYDROCHLORIDE 25 MG: 25 TABLET, FILM COATED ORAL at 22:45

## 2023-07-21 RX ADMIN — DIGOXIN 62.5 MCG: 125 TABLET ORAL at 08:11

## 2023-07-21 RX ADMIN — LEVETIRACETAM 500 MG: 500 TABLET, FILM COATED ORAL at 00:39

## 2023-07-21 RX ADMIN — HYDRALAZINE HYDROCHLORIDE 25 MG: 25 TABLET, FILM COATED ORAL at 00:39

## 2023-07-21 RX ADMIN — METOPROLOL SUCCINATE 50 MG: 50 TABLET, EXTENDED RELEASE ORAL at 08:12

## 2023-07-21 RX ADMIN — HYDRALAZINE HYDROCHLORIDE 25 MG: 25 TABLET, FILM COATED ORAL at 14:36

## 2023-07-21 RX ADMIN — LEVOTHYROXINE SODIUM 75 MCG: 75 TABLET ORAL at 05:18

## 2023-07-21 RX ADMIN — Medication 10 ML: at 22:45

## 2023-07-21 RX ADMIN — ACETAMINOPHEN 650 MG: 325 TABLET ORAL at 22:49

## 2023-07-21 RX ADMIN — DESMOPRESSIN ACETATE 200 MCG: 0.1 TABLET ORAL at 01:17

## 2023-07-21 RX ADMIN — LEVETIRACETAM 500 MG: 500 TABLET, FILM COATED ORAL at 22:44

## 2023-07-21 RX ADMIN — BUDESONIDE 500 MCG: 0.5 SUSPENSION RESPIRATORY (INHALATION) at 08:23

## 2023-07-21 RX ADMIN — FERROUS SULFATE TAB 325 MG (65 MG ELEMENTAL FE) 325 MG: 325 (65 FE) TAB at 22:45

## 2023-07-21 RX ADMIN — ARFORMOTEROL TARTRATE 15 MCG: 15 SOLUTION RESPIRATORY (INHALATION) at 20:56

## 2023-07-21 RX ADMIN — FERROUS SULFATE TAB 325 MG (65 MG ELEMENTAL FE) 325 MG: 325 (65 FE) TAB at 00:39

## 2023-07-21 RX ADMIN — PREDNISONE 5 MG: 5 TABLET ORAL at 08:12

## 2023-07-21 RX ADMIN — BUDESONIDE 500 MCG: 0.5 SUSPENSION RESPIRATORY (INHALATION) at 20:57

## 2023-07-21 RX ADMIN — APIXABAN 5 MG: 5 TABLET, FILM COATED ORAL at 00:39

## 2023-07-21 RX ADMIN — DOCUSATE SODIUM 100 MG: 100 CAPSULE, LIQUID FILLED ORAL at 22:45

## 2023-07-21 RX ADMIN — DESMOPRESSIN ACETATE 200 MCG: 0.1 TABLET ORAL at 08:12

## 2023-07-21 ASSESSMENT — PAIN DESCRIPTION - PAIN TYPE: TYPE: CHRONIC PAIN

## 2023-07-21 ASSESSMENT — PAIN SCALES - GENERAL
PAINLEVEL_OUTOF10: 9
PAINLEVEL_OUTOF10: 3
PAINLEVEL_OUTOF10: 7
PAINLEVEL_OUTOF10: 2
PAINLEVEL_OUTOF10: 2
PAINLEVEL_OUTOF10: 7
PAINLEVEL_OUTOF10: 8
PAINLEVEL_OUTOF10: 2

## 2023-07-21 ASSESSMENT — PAIN DESCRIPTION - LOCATION
LOCATION: CHEST
LOCATION: LEG
LOCATION: SHOULDER;LEG
LOCATION: CHEST

## 2023-07-21 ASSESSMENT — PAIN DESCRIPTION - ORIENTATION
ORIENTATION: RIGHT;LEFT
ORIENTATION: MID
ORIENTATION: MID

## 2023-07-21 ASSESSMENT — PAIN DESCRIPTION - DESCRIPTORS
DESCRIPTORS: ACHING

## 2023-07-21 ASSESSMENT — PAIN DESCRIPTION - FREQUENCY: FREQUENCY: INTERMITTENT

## 2023-07-21 ASSESSMENT — PAIN DESCRIPTION - ONSET: ONSET: ON-GOING

## 2023-07-21 NOTE — DISCHARGE INSTRUCTIONS
HEART FAILURE  / CONGESTIVE HEART FAILURE  DISCHARGE INSTRUCTIONS:  GUIDELINES TO FOLLOW AT HOME    Self- Managed Care:     MEDICATIONS:  Take your medication as directed. If you are experiencing any side effects, inform your doctor, Do not stop taking any of your medications without letting your doctor know. Check with your doctor before taking any over-the-counter medications / herbal / or dietary supplements. They may interfere with your other medications. Do not take ibuprofen (Advil or Motrin) and naproxen (Aleve) without talking to your doctor first. They could make your heart failure worse. WEIGHT MONITORING:   Weigh yourself everyday (with the same scale) around the same time of the day and write it down. (you can chart them on a calendar or keep track of them on paper. Notify your doctor of a weight gain of 3 pounds or more in 1 day   OR a total of 5 pounds or more in 1 week    Take your weight record to your doctor visits  Also, the same goes if you loose more than 3# in one day, let your heart doctor know. DIET:   Cardiac heart healthy diet- Low saturated / low trans fat, no added salt, caffeine restricted, Low sodium diet-   No more than 2,000mg (2 grams) of salt / sodium per day (which equals to a little less than  a teaspoon of salt)  If your doctor wants you on a fluid restriction. ..it is usually recommended a fluid limit of 2,000cc -  Fluid restriction- 2,000 ml (milliliters) = 64 ounces = you can have 8 glasses of fluid per day (each glass 8 ounces)    Follow a low salt diet - avoid using salt at the table, avoid / limit use of canned soups, processed / packaged foods, salted snacks, olives and pickles. Do not use a salt substitute without checking with your doctor, they may contain a high amount of potassioum. (Mrs. Lulu He is safe to use).     Limit the use of alcohol       CALL YOUR DOCTOR THE FIRST DAY YOU NOTICE ANY OF THESE   SYMPTOMS:  You have a

## 2023-07-21 NOTE — TELEPHONE ENCOUNTER
Writer contacted Dr. Jazmín Araiza to inform of 30 day readmission risk. Bellar's attempt to contact ED provider was unsuccessful.      Call Back: If you need to call back to inform of disposition you can contact me at [phone]

## 2023-07-21 NOTE — ED PROVIDER NOTES
78-year-old female history of COPD on chronically 6 L of oxygen presents to the emergency department shortness of breath. EMS reports he was wheezing gave 2 DuoNebs and 125 Solu-Medrol in route. Patient chronically wears 6 L and states that she is been worsening shortness of breath with a cough. She states no fevers chills nausea vomiting dye abdominal pain or symptoms leg swelling or other complaints this time. She states that she was diagnosed yesterday with pneumonia but was unable to get some of her meds refilled recently and this is prompted her to have worsening shortness of breath. The history is provided by the patient. Shortness of Breath  Severity:  Mild  Onset quality:  Gradual  Duration:  3 days  Timing:  Intermittent  Progression:  Waxing and waning  Chronicity:  New  Relieved by:  Nothing  Worsened by:  Nothing  Ineffective treatments:  None tried  Associated symptoms: cough and wheezing    Associated symptoms: no abdominal pain, no chest pain, no diaphoresis, no ear pain, no fever, no headaches, no sputum production and no vomiting       Review of Systems   Constitutional:  Negative for diaphoresis and fever. HENT:  Negative for ear pain. Respiratory:  Positive for cough, shortness of breath and wheezing. Negative for sputum production. Cardiovascular:  Negative for chest pain. Gastrointestinal:  Negative for abdominal pain and vomiting. Neurological:  Negative for headaches. Physical Exam  Constitutional:       Appearance: She is well-developed. HENT:      Head: Normocephalic and atraumatic. Eyes:      Extraocular Movements: Extraocular movements intact. Pupils: Pupils are equal, round, and reactive to light. Cardiovascular:      Rate and Rhythm: Normal rate and regular rhythm. Pulmonary:      Effort: Pulmonary effort is normal.      Breath sounds: Examination of the right-middle field reveals wheezing. Examination of the left-middle field reveals wheezing.

## 2023-07-21 NOTE — CARE COORDINATION
Transition of Care-Met with patient at the bedside, introduced myself and CM role in discharge planning. Patient lives with her  in a two story home, bed/bath set up are on the first floor. Patient wears 6L of oxygen at home, CPAP and nebulizer-all provided by [employer] &  is active with [facility] orders placed. PT/OT ordered to assist in discharge planning/ PCP is Dr. Lakisha Werner, preferred pharmacy is [facility]. Discharge plan is to return home with .      Raimundo HERRERAN, RN  [facility]

## 2023-07-21 NOTE — CONSULTS
TDaP, ADAKIRAN (age 6y-58y), Basim Harvey (age 10y+), IM, 0.5mL 06/02/2010    Zoster Recombinant (Shingrix) 10/12/2020, 10/12/2020, 08/03/2021      Angiotensin-Converting-Enzyme (ACE) inhibitor ordered:  [] Yes  [x] No (specify contraindication):  [] Contraindicated  [] Hypotensive patient who was at immediate risk of cardiogenic shock  [] Hospitalized patient who experienced marked azotemia  [] Other Contraindications  [] Not Eligible  [] Not Tolerant  [] Patient Reason  [] System Reason  [x] Other Reason    Angiotensin II receptor blockers (ARB) ordered:  [] Yes  [x] No (specify contraindication):  [] Contraindicated  [] Hypotensive patient who was at immediate risk of cardiogenic shock  [] Hospitalized patient who experienced marked azotemia  [x] Other Contraindications    ARNI - Angiotensin Receptor Neprilysin Inhibitor ordered:  [] Yes  [x] No (specify contraindication):  [] ACE inhibitor use within the prior 36 hours  [] Allergy  [] Hyperkalemia  [] Hypotension  [] Renal dysfunction defined as creatinine > 2.5 mg/dL in men or > 2.0 mg/dL in women  [] Other Contraindications  [] Not Eligible  [] Not Tolerant  [] Patient Reason  []System Reason  [x]Other Reason    Beta Blocker ordered:    [x] Yes  [] No (specify contraindication):  [] Contraindicated  [] Asthma  [] Fluid Overload  [] Low Blood Pressure  [] Patient recently treated with an intravenous positive inotropic agent  [] Other Contraindications  [] Not Eligible  [] Not Tolerant  [] Patient Reason  [] System Reason    SGLT2 Inhibitor ordered:  [] Yes  [x] No (specify contraindication):  [] Contraindicated  [] Patient currently on dialysis  [] Ketoacidosis  [] Known hypersensitivity to the medication  [] Type I diabetes (not approved for use in patients with Type I diabetes due to increased risk of ketoacidosis)  [] Other Contraindications  [] Not Eligible  [] Not Tolerant  [] Patient Reason  [] System Reason  [x] Other Reason    Aldosterone Antagonist

## 2023-07-21 NOTE — H&P
[facility] History and Physical      CHIEF COMPLAINT: Shortness of breath    History of Present Illness:    Ms. Syd Cruz is a 79-year-old female with past medical history significant for chronic diastolic CHF, atrial fibrillation on Eliquis, chronic anemia, diabetes insipidus, hypertension, hyperlipidemia, hypothyroidism, sarcoidosis who presents with shortness of breath over the past couple of days. In talking with Ms. Myron Jackson, she reports shortness of breath over the last couple of days. She was recently discharged from [facility] on 7/19/2023 for hospitalization for acute hypoxic respiratory failure quiring BiPAP ventilation, possible hospital-acquired pneumonia, atrial fibrillation with RVR. She reports since being discharged her shortness of breath has worsened. She wears 6 L supplemental oxygen at baseline now. She reports that she cannot lay as flat as she usually can. These reason she decided to present to the [facility] emergency department for further evaluation. In the ED, vitals on presentation were temp 98.3, RR 27, HR 96, /91, O2 sat 92% on 6 L supplemental oxygen via nasal cannula. Lab work was obtained which revealed serum sodium 139, potassium 3.9, bicarb 34, creatinine 1.5, blood glucose 114, proBNP 14,315, high-sensitivity troponin 52 followed by 49. CBC was obtained which revealed WBC 9.1, hemoglobin 9.8 with MCV 86.2, platelets 382O. Chest x-ray was obtained which revealed suspicion for mild CHF. Nodularity such as left perihilar location suggests attenuation on follow-up CT imaging for the possibility of neoplasm. In the ED, patient was given IV Lasix 40 mg once. Decision was made to hospitalize the patient and internal medicine was contacted for hospitalization. Upon ROS, patient reports shortness of breath, orthopnea.   She denies fevers, chills, lightheadedness, dizziness, loss consciousness,

## 2023-07-21 NOTE — ACP (ADVANCE CARE PLANNING)
Advance Care Planning   Healthcare Decision Maker:    Primary Decision Maker: [id number] [facility] [phone]    Secondary Decision Maker: Maya Delvalle - Child - [phone]

## 2023-07-22 ENCOUNTER — APPOINTMENT (OUTPATIENT)
Dept: GENERAL RADIOLOGY | Age: 68
DRG: 291 | End: 2023-07-22
Payer: MEDICARE

## 2023-07-22 LAB
ANION GAP SERPL CALCULATED.3IONS-SCNC: 15 MMOL/L (ref 7–16)
B.E.: 12.2 MMOL/L (ref -3–3)
BASOPHILS # BLD: 0.02 K/UL (ref 0–0.2)
BASOPHILS NFR BLD: 0 % (ref 0–2)
BUN SERPL-MCNC: 28 MG/DL (ref 6–23)
CALCIUM SERPL-MCNC: 9.6 MG/DL (ref 8.6–10.2)
CHLORIDE SERPL-SCNC: 94 MMOL/L (ref 98–107)
CO2 SERPL-SCNC: 34 MMOL/L (ref 22–29)
COHB: 0.9 % (ref 0–1.5)
CREAT SERPL-MCNC: 1.6 MG/DL (ref 0.5–1)
CRITICAL: ABNORMAL
DATE ANALYZED: ABNORMAL
DATE OF COLLECTION: ABNORMAL
EOSINOPHIL # BLD: 0.04 K/UL (ref 0.05–0.5)
EOSINOPHILS RELATIVE PERCENT: 1 % (ref 0–6)
ERYTHROCYTE [DISTWIDTH] IN BLOOD BY AUTOMATED COUNT: 14.8 % (ref 11.5–15)
GFR SERPL CREATININE-BSD FRML MDRD: 36 ML/MIN/1.73M2
GLUCOSE SERPL-MCNC: 82 MG/DL (ref 74–99)
HCO3: 37.5 MMOL/L (ref 22–26)
HCT VFR BLD AUTO: 31.7 % (ref 34–48)
HGB BLD-MCNC: 9.5 G/DL (ref 11.5–15.5)
HHB: 5.3 % (ref 0–5)
IMM GRANULOCYTES # BLD AUTO: 0.03 K/UL (ref 0–0.58)
IMM GRANULOCYTES NFR BLD: 0 % (ref 0–5)
LAB: ABNORMAL
LYMPHOCYTES NFR BLD: 0.67 K/UL (ref 1.5–4)
LYMPHOCYTES RELATIVE PERCENT: 9 % (ref 20–42)
Lab: ABNORMAL
MCH RBC QN AUTO: 25.7 PG (ref 26–35)
MCHC RBC AUTO-ENTMCNC: 30 G/DL (ref 32–34.5)
MCV RBC AUTO: 85.9 FL (ref 80–99.9)
METHB: 0.3 % (ref 0–1.5)
MODE: ABNORMAL
MONOCYTES NFR BLD: 0.81 K/UL (ref 0.1–0.95)
MONOCYTES NFR BLD: 11 % (ref 2–12)
NEUTROPHILS NFR BLD: 78 % (ref 43–80)
NEUTS SEG NFR BLD: 5.54 K/UL (ref 1.8–7.3)
O2 CONTENT: 14.3 ML/DL
O2 SATURATION: 94.6 % (ref 92–98.5)
O2HB: 93.5 % (ref 94–97)
OPERATOR ID: 913
PATIENT TEMP: 37 C
PCO2: 52.1 MMHG (ref 35–45)
PH BLOOD GAS: 7.47 (ref 7.35–7.45)
PLATELET # BLD AUTO: 297 K/UL (ref 130–450)
PMV BLD AUTO: 10.7 FL (ref 7–12)
PO2: 74.4 MMHG (ref 75–100)
POTASSIUM SERPL-SCNC: 3.5 MMOL/L (ref 3.5–5)
RBC # BLD AUTO: 3.69 M/UL (ref 3.5–5.5)
SODIUM SERPL-SCNC: 143 MMOL/L (ref 132–146)
SOURCE, BLOOD GAS: ABNORMAL
THB: 10.8 G/DL (ref 11.5–16.5)
TIME ANALYZED: 1417
WBC OTHER # BLD: 7.1 K/UL (ref 4.5–11.5)

## 2023-07-22 PROCEDURE — 2580000003 HC RX 258: Performed by: STUDENT IN AN ORGANIZED HEALTH CARE EDUCATION/TRAINING PROGRAM

## 2023-07-22 PROCEDURE — 2060000000 HC ICU INTERMEDIATE R&B

## 2023-07-22 PROCEDURE — 97161 PT EVAL LOW COMPLEX 20 MIN: CPT

## 2023-07-22 PROCEDURE — 94640 AIRWAY INHALATION TREATMENT: CPT

## 2023-07-22 PROCEDURE — 6370000000 HC RX 637 (ALT 250 FOR IP): Performed by: INTERNAL MEDICINE

## 2023-07-22 PROCEDURE — 99233 SBSQ HOSP IP/OBS HIGH 50: CPT | Performed by: INTERNAL MEDICINE

## 2023-07-22 PROCEDURE — 82805 BLOOD GASES W/O2 SATURATION: CPT

## 2023-07-22 PROCEDURE — 85027 COMPLETE CBC AUTOMATED: CPT

## 2023-07-22 PROCEDURE — 6360000002 HC RX W HCPCS: Performed by: STUDENT IN AN ORGANIZED HEALTH CARE EDUCATION/TRAINING PROGRAM

## 2023-07-22 PROCEDURE — 80048 BASIC METABOLIC PNL TOTAL CA: CPT

## 2023-07-22 PROCEDURE — 2700000000 HC OXYGEN THERAPY PER DAY

## 2023-07-22 PROCEDURE — 94660 CPAP INITIATION&MGMT: CPT

## 2023-07-22 PROCEDURE — 6370000000 HC RX 637 (ALT 250 FOR IP): Performed by: STUDENT IN AN ORGANIZED HEALTH CARE EDUCATION/TRAINING PROGRAM

## 2023-07-22 PROCEDURE — 71045 X-RAY EXAM CHEST 1 VIEW: CPT

## 2023-07-22 RX ORDER — HYDROCODONE BITARTRATE AND ACETAMINOPHEN 5; 325 MG/1; MG/1
1 TABLET ORAL EVERY 12 HOURS PRN
Status: DISCONTINUED | OUTPATIENT
Start: 2023-07-22 | End: 2023-07-24 | Stop reason: HOSPADM

## 2023-07-22 RX ADMIN — AMIODARONE HYDROCHLORIDE 200 MG: 200 TABLET ORAL at 09:00

## 2023-07-22 RX ADMIN — FERROUS SULFATE TAB 325 MG (65 MG ELEMENTAL FE) 325 MG: 325 (65 FE) TAB at 09:04

## 2023-07-22 RX ADMIN — DESMOPRESSIN ACETATE 200 MCG: 0.1 TABLET ORAL at 21:12

## 2023-07-22 RX ADMIN — APIXABAN 5 MG: 5 TABLET, FILM COATED ORAL at 09:00

## 2023-07-22 RX ADMIN — HYDRALAZINE HYDROCHLORIDE 25 MG: 25 TABLET, FILM COATED ORAL at 06:21

## 2023-07-22 RX ADMIN — DESMOPRESSIN ACETATE 200 MCG: 0.1 TABLET ORAL at 09:05

## 2023-07-22 RX ADMIN — APIXABAN 5 MG: 5 TABLET, FILM COATED ORAL at 21:12

## 2023-07-22 RX ADMIN — LEVOTHYROXINE SODIUM 75 MCG: 75 TABLET ORAL at 06:21

## 2023-07-22 RX ADMIN — Medication 10 ML: at 21:14

## 2023-07-22 RX ADMIN — FAMOTIDINE 10 MG: 20 TABLET, FILM COATED ORAL at 09:00

## 2023-07-22 RX ADMIN — Medication 10 ML: at 13:10

## 2023-07-22 RX ADMIN — FAMOTIDINE 10 MG: 20 TABLET, FILM COATED ORAL at 21:12

## 2023-07-22 RX ADMIN — AMLODIPINE BESYLATE 10 MG: 10 TABLET ORAL at 09:00

## 2023-07-22 RX ADMIN — DULOXETINE HYDROCHLORIDE 60 MG: 60 CAPSULE, DELAYED RELEASE ORAL at 09:00

## 2023-07-22 RX ADMIN — METOPROLOL SUCCINATE 50 MG: 50 TABLET, EXTENDED RELEASE ORAL at 09:05

## 2023-07-22 RX ADMIN — DOCUSATE SODIUM 100 MG: 100 CAPSULE, LIQUID FILLED ORAL at 09:00

## 2023-07-22 RX ADMIN — LEVETIRACETAM 500 MG: 500 TABLET, FILM COATED ORAL at 09:00

## 2023-07-22 RX ADMIN — DOCUSATE SODIUM 100 MG: 100 CAPSULE, LIQUID FILLED ORAL at 21:13

## 2023-07-22 RX ADMIN — FERROUS SULFATE TAB 325 MG (65 MG ELEMENTAL FE) 325 MG: 325 (65 FE) TAB at 21:13

## 2023-07-22 RX ADMIN — DIGOXIN 62.5 MCG: 125 TABLET ORAL at 09:01

## 2023-07-22 RX ADMIN — PREDNISONE 5 MG: 5 TABLET ORAL at 09:03

## 2023-07-22 RX ADMIN — HYDRALAZINE HYDROCHLORIDE 25 MG: 25 TABLET, FILM COATED ORAL at 22:46

## 2023-07-22 RX ADMIN — ARFORMOTEROL TARTRATE 15 MCG: 15 SOLUTION RESPIRATORY (INHALATION) at 08:04

## 2023-07-22 RX ADMIN — BUDESONIDE 500 MCG: 0.5 SUSPENSION RESPIRATORY (INHALATION) at 19:47

## 2023-07-22 RX ADMIN — METOPROLOL SUCCINATE 50 MG: 50 TABLET, EXTENDED RELEASE ORAL at 21:13

## 2023-07-22 RX ADMIN — ACETAMINOPHEN 650 MG: 325 TABLET ORAL at 21:12

## 2023-07-22 RX ADMIN — HYDRALAZINE HYDROCHLORIDE 25 MG: 25 TABLET, FILM COATED ORAL at 15:34

## 2023-07-22 RX ADMIN — BUDESONIDE 500 MCG: 0.5 SUSPENSION RESPIRATORY (INHALATION) at 08:04

## 2023-07-22 RX ADMIN — LEVETIRACETAM 500 MG: 500 TABLET, FILM COATED ORAL at 21:12

## 2023-07-22 RX ADMIN — ARFORMOTEROL TARTRATE 15 MCG: 15 SOLUTION RESPIRATORY (INHALATION) at 19:47

## 2023-07-22 RX ADMIN — HYDROCODONE BITARTRATE AND ACETAMINOPHEN 1 TABLET: 5; 325 TABLET ORAL at 15:34

## 2023-07-22 ASSESSMENT — PAIN DESCRIPTION - DESCRIPTORS: DESCRIPTORS: ACHING

## 2023-07-22 ASSESSMENT — PAIN SCALES - GENERAL: PAINLEVEL_OUTOF10: 3

## 2023-07-22 ASSESSMENT — PAIN DESCRIPTION - LOCATION: LOCATION: BACK

## 2023-07-22 ASSESSMENT — PAIN DESCRIPTION - ORIENTATION: ORIENTATION: RIGHT

## 2023-07-23 LAB
ANION GAP SERPL CALCULATED.3IONS-SCNC: 11 MMOL/L (ref 7–16)
BASOPHILS # BLD: 0.04 K/UL (ref 0–0.2)
BASOPHILS NFR BLD: 1 % (ref 0–2)
BUN SERPL-MCNC: 28 MG/DL (ref 6–23)
CALCIUM SERPL-MCNC: 9.4 MG/DL (ref 8.6–10.2)
CHLORIDE SERPL-SCNC: 91 MMOL/L (ref 98–107)
CO2 SERPL-SCNC: 36 MMOL/L (ref 22–29)
CREAT SERPL-MCNC: 1.5 MG/DL (ref 0.5–1)
EOSINOPHIL # BLD: 0.16 K/UL (ref 0.05–0.5)
EOSINOPHILS RELATIVE PERCENT: 2 % (ref 0–6)
ERYTHROCYTE [DISTWIDTH] IN BLOOD BY AUTOMATED COUNT: 15.6 % (ref 11.5–15)
GFR SERPL CREATININE-BSD FRML MDRD: 39 ML/MIN/1.73M2
GLUCOSE SERPL-MCNC: 99 MG/DL (ref 74–99)
HCT VFR BLD AUTO: 32.5 % (ref 34–48)
HGB BLD-MCNC: 10 G/DL (ref 11.5–15.5)
IMM GRANULOCYTES # BLD AUTO: 0.04 K/UL (ref 0–0.58)
IMM GRANULOCYTES NFR BLD: 1 % (ref 0–5)
LYMPHOCYTES NFR BLD: 0.66 K/UL (ref 1.5–4)
LYMPHOCYTES RELATIVE PERCENT: 9 % (ref 20–42)
MCH RBC QN AUTO: 26.1 PG (ref 26–35)
MCHC RBC AUTO-ENTMCNC: 30.8 G/DL (ref 32–34.5)
MCV RBC AUTO: 84.9 FL (ref 80–99.9)
MONOCYTES NFR BLD: 0.85 K/UL (ref 0.1–0.95)
MONOCYTES NFR BLD: 12 % (ref 2–12)
NEUTROPHILS NFR BLD: 75 % (ref 43–80)
NEUTS SEG NFR BLD: 5.26 K/UL (ref 1.8–7.3)
PLATELET # BLD AUTO: 313 K/UL (ref 130–450)
PMV BLD AUTO: 11.1 FL (ref 7–12)
POTASSIUM SERPL-SCNC: 3.5 MMOL/L (ref 3.5–5)
RBC # BLD AUTO: 3.83 M/UL (ref 3.5–5.5)
REASON FOR REJECTION: NORMAL
SODIUM SERPL-SCNC: 138 MMOL/L (ref 132–146)
SPECIMEN SOURCE: NORMAL
WBC OTHER # BLD: 7 K/UL (ref 4.5–11.5)
ZZ NTE CLEAN UP: ORDERED TEST: NORMAL

## 2023-07-23 PROCEDURE — 2580000003 HC RX 258: Performed by: STUDENT IN AN ORGANIZED HEALTH CARE EDUCATION/TRAINING PROGRAM

## 2023-07-23 PROCEDURE — 85027 COMPLETE CBC AUTOMATED: CPT

## 2023-07-23 PROCEDURE — 6360000002 HC RX W HCPCS: Performed by: STUDENT IN AN ORGANIZED HEALTH CARE EDUCATION/TRAINING PROGRAM

## 2023-07-23 PROCEDURE — 2060000000 HC ICU INTERMEDIATE R&B

## 2023-07-23 PROCEDURE — 6370000000 HC RX 637 (ALT 250 FOR IP): Performed by: STUDENT IN AN ORGANIZED HEALTH CARE EDUCATION/TRAINING PROGRAM

## 2023-07-23 PROCEDURE — 6370000000 HC RX 637 (ALT 250 FOR IP): Performed by: INTERNAL MEDICINE

## 2023-07-23 PROCEDURE — 36415 COLL VENOUS BLD VENIPUNCTURE: CPT

## 2023-07-23 PROCEDURE — 80048 BASIC METABOLIC PNL TOTAL CA: CPT

## 2023-07-23 PROCEDURE — 99233 SBSQ HOSP IP/OBS HIGH 50: CPT | Performed by: INTERNAL MEDICINE

## 2023-07-23 PROCEDURE — 94640 AIRWAY INHALATION TREATMENT: CPT

## 2023-07-23 PROCEDURE — 2700000000 HC OXYGEN THERAPY PER DAY

## 2023-07-23 PROCEDURE — 94660 CPAP INITIATION&MGMT: CPT

## 2023-07-23 RX ADMIN — METOPROLOL SUCCINATE 50 MG: 50 TABLET, EXTENDED RELEASE ORAL at 21:44

## 2023-07-23 RX ADMIN — HYDRALAZINE HYDROCHLORIDE 25 MG: 25 TABLET, FILM COATED ORAL at 05:35

## 2023-07-23 RX ADMIN — AMIODARONE HYDROCHLORIDE 200 MG: 200 TABLET ORAL at 11:17

## 2023-07-23 RX ADMIN — HYDRALAZINE HYDROCHLORIDE 25 MG: 25 TABLET, FILM COATED ORAL at 21:44

## 2023-07-23 RX ADMIN — DULOXETINE HYDROCHLORIDE 60 MG: 60 CAPSULE, DELAYED RELEASE ORAL at 11:18

## 2023-07-23 RX ADMIN — LEVETIRACETAM 500 MG: 500 TABLET, FILM COATED ORAL at 11:21

## 2023-07-23 RX ADMIN — APIXABAN 5 MG: 5 TABLET, FILM COATED ORAL at 11:20

## 2023-07-23 RX ADMIN — LEVOTHYROXINE SODIUM 75 MCG: 75 TABLET ORAL at 05:35

## 2023-07-23 RX ADMIN — FERROUS SULFATE TAB 325 MG (65 MG ELEMENTAL FE) 325 MG: 325 (65 FE) TAB at 11:18

## 2023-07-23 RX ADMIN — ACETAMINOPHEN 650 MG: 325 TABLET ORAL at 11:21

## 2023-07-23 RX ADMIN — DESMOPRESSIN ACETATE 200 MCG: 0.1 TABLET ORAL at 21:45

## 2023-07-23 RX ADMIN — FERROUS SULFATE TAB 325 MG (65 MG ELEMENTAL FE) 325 MG: 325 (65 FE) TAB at 21:45

## 2023-07-23 RX ADMIN — ARFORMOTEROL TARTRATE 15 MCG: 15 SOLUTION RESPIRATORY (INHALATION) at 08:00

## 2023-07-23 RX ADMIN — Medication 10 ML: at 21:46

## 2023-07-23 RX ADMIN — ACETAMINOPHEN 650 MG: 325 TABLET ORAL at 18:01

## 2023-07-23 RX ADMIN — METOPROLOL SUCCINATE 50 MG: 50 TABLET, EXTENDED RELEASE ORAL at 11:18

## 2023-07-23 RX ADMIN — APIXABAN 5 MG: 5 TABLET, FILM COATED ORAL at 21:45

## 2023-07-23 RX ADMIN — DESMOPRESSIN ACETATE 200 MCG: 0.1 TABLET ORAL at 11:19

## 2023-07-23 RX ADMIN — DOCUSATE SODIUM 100 MG: 100 CAPSULE, LIQUID FILLED ORAL at 11:20

## 2023-07-23 RX ADMIN — DOCUSATE SODIUM 100 MG: 100 CAPSULE, LIQUID FILLED ORAL at 21:45

## 2023-07-23 RX ADMIN — BUDESONIDE 500 MCG: 0.5 SUSPENSION RESPIRATORY (INHALATION) at 18:57

## 2023-07-23 RX ADMIN — ARFORMOTEROL TARTRATE 15 MCG: 15 SOLUTION RESPIRATORY (INHALATION) at 18:57

## 2023-07-23 RX ADMIN — LEVETIRACETAM 500 MG: 500 TABLET, FILM COATED ORAL at 21:44

## 2023-07-23 RX ADMIN — FAMOTIDINE 10 MG: 20 TABLET, FILM COATED ORAL at 21:45

## 2023-07-23 RX ADMIN — HYDROCODONE BITARTRATE AND ACETAMINOPHEN 1 TABLET: 5; 325 TABLET ORAL at 15:41

## 2023-07-23 RX ADMIN — PREDNISONE 5 MG: 5 TABLET ORAL at 11:20

## 2023-07-23 RX ADMIN — BUDESONIDE 500 MCG: 0.5 SUSPENSION RESPIRATORY (INHALATION) at 08:00

## 2023-07-23 RX ADMIN — HYDRALAZINE HYDROCHLORIDE 25 MG: 25 TABLET, FILM COATED ORAL at 15:41

## 2023-07-23 RX ADMIN — AMLODIPINE BESYLATE 10 MG: 10 TABLET ORAL at 11:22

## 2023-07-23 RX ADMIN — DIGOXIN 62.5 MCG: 125 TABLET ORAL at 11:19

## 2023-07-23 RX ADMIN — FAMOTIDINE 10 MG: 20 TABLET, FILM COATED ORAL at 11:18

## 2023-07-23 RX ADMIN — Medication 10 ML: at 11:23

## 2023-07-23 RX ADMIN — HYDROCODONE BITARTRATE AND ACETAMINOPHEN 1 TABLET: 5; 325 TABLET ORAL at 03:45

## 2023-07-23 ASSESSMENT — PAIN DESCRIPTION - ONSET: ONSET: ON-GOING

## 2023-07-23 ASSESSMENT — PAIN - FUNCTIONAL ASSESSMENT
PAIN_FUNCTIONAL_ASSESSMENT: PREVENTS OR INTERFERES SOME ACTIVE ACTIVITIES AND ADLS
PAIN_FUNCTIONAL_ASSESSMENT: ACTIVITIES ARE NOT PREVENTED
PAIN_FUNCTIONAL_ASSESSMENT: ACTIVITIES ARE NOT PREVENTED

## 2023-07-23 ASSESSMENT — PAIN DESCRIPTION - LOCATION
LOCATION: BACK
LOCATION: BACK
LOCATION: LEG
LOCATION: SHOULDER

## 2023-07-23 ASSESSMENT — PAIN DESCRIPTION - ORIENTATION
ORIENTATION: RIGHT
ORIENTATION: OTHER (COMMENT)
ORIENTATION: RIGHT;LEFT
ORIENTATION: OTHER (COMMENT)

## 2023-07-23 ASSESSMENT — PAIN DESCRIPTION - DESCRIPTORS
DESCRIPTORS: ACHING
DESCRIPTORS: BURNING
DESCRIPTORS: ACHING;DISCOMFORT;SORE
DESCRIPTORS: ACHING

## 2023-07-23 ASSESSMENT — PAIN DESCRIPTION - PAIN TYPE: TYPE: ACUTE PAIN

## 2023-07-23 ASSESSMENT — PAIN DESCRIPTION - FREQUENCY: FREQUENCY: INTERMITTENT

## 2023-07-23 ASSESSMENT — PAIN SCALES - GENERAL
PAINLEVEL_OUTOF10: 3
PAINLEVEL_OUTOF10: 10
PAINLEVEL_OUTOF10: 8
PAINLEVEL_OUTOF10: 3
PAINLEVEL_OUTOF10: 9

## 2023-07-24 VITALS
HEIGHT: 65 IN | BODY MASS INDEX: 23.63 KG/M2 | DIASTOLIC BLOOD PRESSURE: 80 MMHG | TEMPERATURE: 97.9 F | WEIGHT: 141.8 LBS | RESPIRATION RATE: 16 BRPM | OXYGEN SATURATION: 100 % | SYSTOLIC BLOOD PRESSURE: 129 MMHG | HEART RATE: 78 BPM

## 2023-07-24 LAB
ANION GAP SERPL CALCULATED.3IONS-SCNC: 11 MMOL/L (ref 7–16)
BASOPHILS # BLD: 0.03 K/UL (ref 0–0.2)
BASOPHILS NFR BLD: 1 % (ref 0–2)
BUN SERPL-MCNC: 27 MG/DL (ref 6–23)
CALCIUM SERPL-MCNC: 9 MG/DL (ref 8.6–10.2)
CHLORIDE SERPL-SCNC: 98 MMOL/L (ref 98–107)
CO2 SERPL-SCNC: 34 MMOL/L (ref 22–29)
CREAT SERPL-MCNC: 1.5 MG/DL (ref 0.5–1)
EOSINOPHIL # BLD: 0.14 K/UL (ref 0.05–0.5)
EOSINOPHILS RELATIVE PERCENT: 2 % (ref 0–6)
ERYTHROCYTE [DISTWIDTH] IN BLOOD BY AUTOMATED COUNT: 14.7 % (ref 11.5–15)
GFR SERPL CREATININE-BSD FRML MDRD: 39 ML/MIN/1.73M2
GLUCOSE SERPL-MCNC: 76 MG/DL (ref 74–99)
HCT VFR BLD AUTO: 30.4 % (ref 34–48)
HGB BLD-MCNC: 9.4 G/DL (ref 11.5–15.5)
IMM GRANULOCYTES # BLD AUTO: 0.05 K/UL (ref 0–0.58)
IMM GRANULOCYTES NFR BLD: 1 % (ref 0–5)
LYMPHOCYTES NFR BLD: 0.78 K/UL (ref 1.5–4)
LYMPHOCYTES RELATIVE PERCENT: 13 % (ref 20–42)
MCH RBC QN AUTO: 25.8 PG (ref 26–35)
MCHC RBC AUTO-ENTMCNC: 30.9 G/DL (ref 32–34.5)
MCV RBC AUTO: 83.5 FL (ref 80–99.9)
MONOCYTES NFR BLD: 0.64 K/UL (ref 0.1–0.95)
MONOCYTES NFR BLD: 10 % (ref 2–12)
NEUTROPHILS NFR BLD: 73 % (ref 43–80)
NEUTS SEG NFR BLD: 4.53 K/UL (ref 1.8–7.3)
PLATELET # BLD AUTO: 270 K/UL (ref 130–450)
PMV BLD AUTO: 10 FL (ref 7–12)
POTASSIUM SERPL-SCNC: 3.4 MMOL/L (ref 3.5–5)
RBC # BLD AUTO: 3.64 M/UL (ref 3.5–5.5)
SODIUM SERPL-SCNC: 143 MMOL/L (ref 132–146)
WBC OTHER # BLD: 6.2 K/UL (ref 4.5–11.5)

## 2023-07-24 PROCEDURE — 6370000000 HC RX 637 (ALT 250 FOR IP): Performed by: NURSE PRACTITIONER

## 2023-07-24 PROCEDURE — 85027 COMPLETE CBC AUTOMATED: CPT

## 2023-07-24 PROCEDURE — 97530 THERAPEUTIC ACTIVITIES: CPT

## 2023-07-24 PROCEDURE — 94640 AIRWAY INHALATION TREATMENT: CPT

## 2023-07-24 PROCEDURE — 2580000003 HC RX 258: Performed by: STUDENT IN AN ORGANIZED HEALTH CARE EDUCATION/TRAINING PROGRAM

## 2023-07-24 PROCEDURE — 6370000000 HC RX 637 (ALT 250 FOR IP): Performed by: STUDENT IN AN ORGANIZED HEALTH CARE EDUCATION/TRAINING PROGRAM

## 2023-07-24 PROCEDURE — 97165 OT EVAL LOW COMPLEX 30 MIN: CPT

## 2023-07-24 PROCEDURE — 80048 BASIC METABOLIC PNL TOTAL CA: CPT

## 2023-07-24 PROCEDURE — 6360000002 HC RX W HCPCS: Performed by: STUDENT IN AN ORGANIZED HEALTH CARE EDUCATION/TRAINING PROGRAM

## 2023-07-24 PROCEDURE — APPSS30 APP SPLIT SHARED TIME 16-30 MINUTES: Performed by: NURSE PRACTITIONER

## 2023-07-24 PROCEDURE — 6370000000 HC RX 637 (ALT 250 FOR IP): Performed by: INTERNAL MEDICINE

## 2023-07-24 PROCEDURE — 36415 COLL VENOUS BLD VENIPUNCTURE: CPT

## 2023-07-24 PROCEDURE — 94660 CPAP INITIATION&MGMT: CPT

## 2023-07-24 PROCEDURE — 6360000002 HC RX W HCPCS: Performed by: INTERNAL MEDICINE

## 2023-07-24 PROCEDURE — 2700000000 HC OXYGEN THERAPY PER DAY

## 2023-07-24 RX ORDER — DIGOXIN 125 MCG
125 TABLET ORAL DAILY
Qty: 30 TABLET | Refills: 3 | Status: SHIPPED
Start: 2023-07-24

## 2023-07-24 RX ORDER — POTASSIUM CHLORIDE 20 MEQ/1
40 TABLET, EXTENDED RELEASE ORAL ONCE
Status: COMPLETED | OUTPATIENT
Start: 2023-07-24 | End: 2023-07-24

## 2023-07-24 RX ORDER — FUROSEMIDE 20 MG/1
20 TABLET ORAL DAILY
Qty: 30 TABLET | Refills: 0 | Status: SHIPPED | OUTPATIENT
Start: 2023-07-24 | End: 2023-08-23

## 2023-07-24 RX ORDER — FUROSEMIDE 10 MG/ML
40 INJECTION INTRAMUSCULAR; INTRAVENOUS ONCE
Status: COMPLETED | OUTPATIENT
Start: 2023-07-24 | End: 2023-07-24

## 2023-07-24 RX ADMIN — LEVETIRACETAM 500 MG: 500 TABLET, FILM COATED ORAL at 09:10

## 2023-07-24 RX ADMIN — LEVOTHYROXINE SODIUM 75 MCG: 75 TABLET ORAL at 05:28

## 2023-07-24 RX ADMIN — AMLODIPINE BESYLATE 10 MG: 10 TABLET ORAL at 09:09

## 2023-07-24 RX ADMIN — BUDESONIDE 500 MCG: 0.5 SUSPENSION RESPIRATORY (INHALATION) at 11:03

## 2023-07-24 RX ADMIN — PREDNISONE 5 MG: 5 TABLET ORAL at 09:11

## 2023-07-24 RX ADMIN — DOCUSATE SODIUM 100 MG: 100 CAPSULE, LIQUID FILLED ORAL at 09:10

## 2023-07-24 RX ADMIN — AMIODARONE HYDROCHLORIDE 200 MG: 200 TABLET ORAL at 09:10

## 2023-07-24 RX ADMIN — HYDRALAZINE HYDROCHLORIDE 25 MG: 25 TABLET, FILM COATED ORAL at 14:05

## 2023-07-24 RX ADMIN — POTASSIUM CHLORIDE 40 MEQ: 1500 TABLET, EXTENDED RELEASE ORAL at 11:42

## 2023-07-24 RX ADMIN — HYDRALAZINE HYDROCHLORIDE 25 MG: 25 TABLET, FILM COATED ORAL at 05:28

## 2023-07-24 RX ADMIN — ACETAMINOPHEN 650 MG: 325 TABLET ORAL at 09:05

## 2023-07-24 RX ADMIN — ARFORMOTEROL TARTRATE 15 MCG: 15 SOLUTION RESPIRATORY (INHALATION) at 11:03

## 2023-07-24 RX ADMIN — FERROUS SULFATE TAB 325 MG (65 MG ELEMENTAL FE) 325 MG: 325 (65 FE) TAB at 09:10

## 2023-07-24 RX ADMIN — DIGOXIN 62.5 MCG: 125 TABLET ORAL at 09:07

## 2023-07-24 RX ADMIN — APIXABAN 5 MG: 5 TABLET, FILM COATED ORAL at 09:09

## 2023-07-24 RX ADMIN — Medication 10 ML: at 09:11

## 2023-07-24 RX ADMIN — FUROSEMIDE 40 MG: 10 INJECTION, SOLUTION INTRAMUSCULAR; INTRAVENOUS at 10:25

## 2023-07-24 RX ADMIN — DULOXETINE HYDROCHLORIDE 60 MG: 60 CAPSULE, DELAYED RELEASE ORAL at 09:10

## 2023-07-24 RX ADMIN — DESMOPRESSIN ACETATE 200 MCG: 0.1 TABLET ORAL at 09:07

## 2023-07-24 RX ADMIN — HYDROCODONE BITARTRATE AND ACETAMINOPHEN 1 TABLET: 5; 325 TABLET ORAL at 11:42

## 2023-07-24 RX ADMIN — METOPROLOL SUCCINATE 50 MG: 50 TABLET, EXTENDED RELEASE ORAL at 09:09

## 2023-07-24 RX ADMIN — FAMOTIDINE 10 MG: 20 TABLET, FILM COATED ORAL at 09:08

## 2023-07-24 ASSESSMENT — PAIN DESCRIPTION - LOCATION
LOCATION: HEAD
LOCATION: SHOULDER
LOCATION: HEAD

## 2023-07-24 ASSESSMENT — PAIN DESCRIPTION - DESCRIPTORS
DESCRIPTORS: ACHING;DISCOMFORT

## 2023-07-24 ASSESSMENT — PAIN DESCRIPTION - ORIENTATION
ORIENTATION: MID
ORIENTATION: RIGHT
ORIENTATION: MID

## 2023-07-24 ASSESSMENT — PAIN SCALES - GENERAL
PAINLEVEL_OUTOF10: 9
PAINLEVEL_OUTOF10: 7
PAINLEVEL_OUTOF10: 7

## 2023-07-24 NOTE — CARE COORDINATION
Transition of Care-Patient is on 7L High Flow oxygen-however at 97% SpO2, nursing to please wean. Admitted four days ago, Cardiology nor Pulmonology following case. One time dose of Lasix ordered today and incentive spirometer, anticipate discharge soon-12-24 hrs. Plan is home, she is wheelchair bound , family to transport home. Baseline oxygen is 6L,has her own CPAP and nebulizer-via [employer].      William Mccollum BSN, RN  [facility]

## 2023-07-24 NOTE — DISCHARGE SUMMARY
[facility] Physician Discharge Summary       [place], MD  [address]  [facility] [address]  [phone]    Follow up in 1 week(s)  for hospital follow up    Cassidy Gordon MD  [address]  [address] [facility] [address], [place]  [address] [address]  [address], [place] [address]  [phone]            Activity level: As tolerated     Dispo: Home      Condition on discharge: Stable     Patient ID:  Janis Gates  [id number]  78 y.o.  1955    Admit date: 7/20/2023    Discharge date and time:  7/24/2023  2:04 PM    Admission Diagnoses: Principal Problem:    Acute combined systolic and diastolic CHF, NYHA class 1 (HCC)  Active Problems:    Chronic hypoxemic respiratory failure (HCC)    Elevated troponin level    Acute on chronic diastolic CHF (congestive heart failure) ([address])  Resolved Problems:    * No resolved hospital problems. *      Discharge Diagnoses: Principal Problem:    Acute combined systolic and diastolic CHF, NYHA class 1 (HCC)  Active Problems:    Chronic hypoxemic respiratory failure (HCC)    Elevated troponin level    Acute on chronic diastolic CHF (congestive heart failure) (HCC)  Resolved Problems:    * No resolved hospital problems. *      Consults:  IP CONSULT TO IV TEAM  IP CONSULT TO HEART FAILURE NURSE/COORDINATOR  IP CONSULT TO IV TEAM  IP CONSULT TO IV TEAM    Hospital Course:   Patient Janis Gates is a 78 y.o. presented with Acute combined systolic and diastolic CHF, NYHA class 1 (HCC) [I50.41]  Decompensated heart failure ([address]) [I50.9]  Acute on chronic diastolic CHF (congestive heart failure), NYHA class 2 ([address]) [I50.33]    Charu Saleh is a 78year old woman with hx of diastolic CHF, severe pulmonary hypertension, tricuspid regurgitation, atria fib, pulmonary sarcoidosis, DI, adrenal insufficiency.     Recent admission at [facility] (1-RH) for

## 2023-07-24 NOTE — PLAN OF CARE
Patient's chart updated to reflect:      . - HF care plan, HF education points and HF discharge instructions.  -Orders: 2 gram sodium diet, daily weights, I/O.  -PCP and cardiology follow up appointments to be scheduled within 7 days of hospital discharge. -CHF education session will be provided to the patient prior to hospital discharge.     Hesham Wheatley RN BSN  Heart Failure Navigator
Problem: ABCDS Injury Assessment  Goal: Absence of physical injury  7/21/2023 0228 by Heide Valencia RN  Outcome: Progressing     Problem: Skin/Tissue Integrity  Goal: Absence of new skin breakdown  Description: 1. Monitor for areas of redness and/or skin breakdown  2. Assess vascular access sites hourly  3. Every 4-6 hours minimum:  Change oxygen saturation probe site  4. Every 4-6 hours:  If on nasal continuous positive airway pressure, respiratory therapy assess nares and determine need for appliance change or resting period.   7/21/2023 0228 by Heide Valencia RN  Outcome: Progressing     Problem: Safety - Adult  Goal: Free from fall injury  7/21/2023 1237 by Brandon Camejo RN  Outcome: Progressing  7/21/2023 0228 by Heide Valencia RN  Outcome: Progressing
Problem: Discharge Planning  Goal: Discharge to home or other facility with appropriate resources  Outcome: Progressing     Problem: Pain  Goal: Verbalizes/displays adequate comfort level or baseline comfort level  7/24/2023 1046 by Rosalinda Reeder RN  Outcome: Progressing  7/24/2023 0048 by Bess Bryant RN  Outcome: Progressing     Problem: Skin/Tissue Integrity  Goal: Absence of new skin breakdown  Description: 1. Monitor for areas of redness and/or skin breakdown  2. Assess vascular access sites hourly  3. Every 4-6 hours minimum:  Change oxygen saturation probe site  4. Every 4-6 hours:  If on nasal continuous positive airway pressure, respiratory therapy assess nares and determine need for appliance change or resting period.   7/24/2023 1046 by Rosalinda Reeder RN  Outcome: Progressing  7/24/2023 0048 by Bess Bryant RN  Outcome: Progressing     Problem: Safety - Adult  Goal: Free from fall injury  7/24/2023 1046 by Rosalinda Reeder RN  Outcome: Progressing  7/24/2023 0048 by Bess Bryant RN  Outcome: Progressing     Problem: ABCDS Injury Assessment  Goal: Absence of physical injury  7/24/2023 1046 by Rosalinda Reeder RN  Outcome: Progressing  7/24/2023 0048 by Bess Bryant RN  Outcome: Progressing     Problem: Chronic Conditions and Co-morbidities  Goal: Patient's chronic conditions and co-morbidity symptoms are monitored and maintained or improved  Outcome: Progressing     Problem: Cardiovascular - Adult  Goal: Maintains optimal cardiac output and hemodynamic stability  Outcome: Progressing  Goal: Absence of cardiac dysrhythmias or at baseline  Outcome: Progressing
Problem: Discharge Planning  Goal: Discharge to home or other facility with appropriate resources  Outcome: Progressing     Problem: Pain  Goal: Verbalizes/displays adequate comfort level or baseline comfort level  Outcome: Progressing     Problem: Skin/Tissue Integrity  Goal: Absence of new skin breakdown  Description: 1. Monitor for areas of redness and/or skin breakdown  2. Assess vascular access sites hourly  3. Every 4-6 hours minimum:  Change oxygen saturation probe site  4. Every 4-6 hours:  If on nasal continuous positive airway pressure, respiratory therapy assess nares and determine need for appliance change or resting period.   Outcome: Progressing     Problem: Safety - Adult  Goal: Free from fall injury  Outcome: Progressing     Problem: ABCDS Injury Assessment  Goal: Absence of physical injury  Outcome: Progressing
Problem: Discharge Planning  Goal: Discharge to home or other facility with appropriate resources  Outcome: Progressing     Problem: Pain  Goal: Verbalizes/displays adequate comfort level or baseline comfort level  Outcome: Progressing     Problem: Skin/Tissue Integrity  Goal: Absence of new skin breakdown  Description: 1. Monitor for areas of redness and/or skin breakdown  2. Assess vascular access sites hourly  3. Every 4-6 hours minimum:  Change oxygen saturation probe site  4. Every 4-6 hours:  If on nasal continuous positive airway pressure, respiratory therapy assess nares and determine need for appliance change or resting period.   Outcome: Progressing     Problem: Safety - Adult  Goal: Free from fall injury  Outcome: Progressing     Problem: ABCDS Injury Assessment  Goal: Absence of physical injury  Outcome: Progressing     Problem: Chronic Conditions and Co-morbidities  Goal: Patient's chronic conditions and co-morbidity symptoms are monitored and maintained or improved  Outcome: Progressing     Problem: Cardiovascular - Adult  Goal: Maintains optimal cardiac output and hemodynamic stability  Outcome: Progressing     Problem: Cardiovascular - Adult  Goal: Absence of cardiac dysrhythmias or at baseline  Outcome: Progressing
Problem: Pain  Goal: Verbalizes/displays adequate comfort level or baseline comfort level  Outcome: Progressing     Problem: Skin/Tissue Integrity  Goal: Absence of new skin breakdown  Description: 1. Monitor for areas of redness and/or skin breakdown  2. Assess vascular access sites hourly  3. Every 4-6 hours minimum:  Change oxygen saturation probe site  4. Every 4-6 hours:  If on nasal continuous positive airway pressure, respiratory therapy assess nares and determine need for appliance change or resting period.   Outcome: Progressing     Problem: Safety - Adult  Goal: Free from fall injury  Outcome: Progressing     Problem: ABCDS Injury Assessment  Goal: Absence of physical injury  Outcome: Progressing
RN  Outcome: Completed  7/24/2023 1046 by Radha Bagley RN  Outcome: Progressing  Goal: Absence of cardiac dysrhythmias or at baseline  7/24/2023 1410 by Radha Bagley RN  Outcome: Completed  7/24/2023 1046 by Radha Bagley RN  Outcome: Progressing

## 2023-07-26 ENCOUNTER — HOSPITAL ENCOUNTER (INPATIENT)
Age: 68
LOS: 2 days | Discharge: HOME | DRG: 193 | End: 2023-07-28
Attending: STUDENT IN AN ORGANIZED HEALTH CARE EDUCATION/TRAINING PROGRAM | Admitting: FAMILY MEDICINE
Payer: MEDICARE

## 2023-07-26 ENCOUNTER — APPOINTMENT (OUTPATIENT)
Dept: CT IMAGING | Age: 68
DRG: 193 | End: 2023-07-26
Payer: MEDICARE

## 2023-07-26 ENCOUNTER — APPOINTMENT (OUTPATIENT)
Dept: GENERAL RADIOLOGY | Age: 68
DRG: 193 | End: 2023-07-26
Payer: MEDICARE

## 2023-07-26 DIAGNOSIS — D86.9 SARCOIDOSIS: ICD-10-CM

## 2023-07-26 DIAGNOSIS — J96.01 ACUTE RESPIRATORY FAILURE WITH HYPOXIA (HCC): Primary | ICD-10-CM

## 2023-07-26 LAB
AADO2: 590 MMHG
ALBUMIN SERPL-MCNC: 3.8 G/DL (ref 3.5–5.2)
ALP SERPL-CCNC: 94 U/L (ref 35–104)
ALT SERPL-CCNC: 11 U/L (ref 0–32)
ANION GAP SERPL CALCULATED.3IONS-SCNC: 10 MMOL/L (ref 7–16)
AST SERPL-CCNC: 17 U/L (ref 0–31)
B PARAP IS1001 DNA NPH QL NAA+NON-PROBE: NOT DETECTED
B PERT DNA SPEC QL NAA+PROBE: NOT DETECTED
B.E.: 10.1 MMOL/L (ref -3–3)
B.E.: 8.7 MMOL/L (ref -3–3)
BASOPHILS # BLD: 0.04 K/UL (ref 0–0.2)
BASOPHILS NFR BLD: 0 % (ref 0–2)
BILIRUB SERPL-MCNC: 0.3 MG/DL (ref 0–1.2)
BNP SERPL-MCNC: 2542 PG/ML (ref 0–125)
BUN SERPL-MCNC: 25 MG/DL (ref 6–23)
C PNEUM DNA NPH QL NAA+NON-PROBE: NOT DETECTED
CALCIUM SERPL-MCNC: 9.7 MG/DL (ref 8.6–10.2)
CHLORIDE SERPL-SCNC: 101 MMOL/L (ref 98–107)
CO2 SERPL-SCNC: 35 MMOL/L (ref 22–29)
COHB: 0.5 % (ref 0–1.5)
COMMENT: ABNORMAL
COMMENT: ABNORMAL
CREAT SERPL-MCNC: 1.4 MG/DL (ref 0.5–1)
CRITICAL: ABNORMAL
CRITICAL: ABNORMAL
DATE ANALYZED: ABNORMAL
DATE ANALYZED: ABNORMAL
DATE OF COLLECTION: ABNORMAL
DATE OF COLLECTION: ABNORMAL
EOSINOPHIL # BLD: 0.31 K/UL (ref 0.05–0.5)
EOSINOPHILS RELATIVE PERCENT: 3 % (ref 0–6)
ERYTHROCYTE [DISTWIDTH] IN BLOOD BY AUTOMATED COUNT: 14.9 % (ref 11.5–15)
FIO2: 100
FIO2: 100 %
FLUAV RNA NPH QL NAA+NON-PROBE: NOT DETECTED
FLUBV RNA NPH QL NAA+NON-PROBE: NOT DETECTED
GFR SERPL CREATININE-BSD FRML MDRD: 42 ML/MIN/1.73M2
GLUCOSE SERPL-MCNC: 113 MG/DL (ref 74–99)
HADV DNA NPH QL NAA+NON-PROBE: NOT DETECTED
HCO3: 33.3 MMOL/L (ref 22–26)
HCO3: 37.4 MMOL/L (ref 22–26)
HCOV 229E RNA NPH QL NAA+NON-PROBE: NOT DETECTED
HCOV HKU1 RNA NPH QL NAA+NON-PROBE: NOT DETECTED
HCOV NL63 RNA NPH QL NAA+NON-PROBE: NOT DETECTED
HCOV OC43 RNA NPH QL NAA+NON-PROBE: NOT DETECTED
HCT VFR BLD AUTO: 34 % (ref 37–54)
HCT VFR BLD AUTO: 36.3 % (ref 34–48)
HGB BLD-MCNC: 10.4 G/DL (ref 11.5–15.5)
HHB: 0.1 % (ref 0–5)
HMPV RNA NPH QL NAA+NON-PROBE: NOT DETECTED
HPIV1 RNA NPH QL NAA+NON-PROBE: NOT DETECTED
HPIV2 RNA NPH QL NAA+NON-PROBE: NOT DETECTED
HPIV3 RNA NPH QL NAA+NON-PROBE: NOT DETECTED
HPIV4 RNA NPH QL NAA+NON-PROBE: NOT DETECTED
IMM GRANULOCYTES # BLD AUTO: 0.06 K/UL (ref 0–0.58)
IMM GRANULOCYTES NFR BLD: 1 % (ref 0–5)
LAB: ABNORMAL
LAB: ABNORMAL
LACTATE BLDV-SCNC: 1.8 MMOL/L (ref 0.5–2.2)
LYMPHOCYTES NFR BLD: 0.7 K/UL (ref 1.5–4)
LYMPHOCYTES RELATIVE PERCENT: 8 % (ref 20–42)
Lab: ABNORMAL
Lab: ABNORMAL
M PNEUMO DNA NPH QL NAA+NON-PROBE: NOT DETECTED
MAGNESIUM SERPL-MCNC: 1.8 MG/DL (ref 1.6–2.6)
MCH RBC QN AUTO: 25.3 PG (ref 26–35)
MCHC RBC AUTO-ENTMCNC: 28.7 G/DL (ref 32–34.5)
MCV RBC AUTO: 88.3 FL (ref 80–99.9)
METHB: 0.4 % (ref 0–1.5)
MODE: ABNORMAL
MODE: ABNORMAL
MONOCYTES NFR BLD: 0.92 K/UL (ref 0.1–0.95)
MONOCYTES NFR BLD: 10 % (ref 2–12)
NEUTROPHILS NFR BLD: 78 % (ref 43–80)
NEUTS SEG NFR BLD: 7.25 K/UL (ref 1.8–7.3)
O2 CONTENT: 16.8 ML/DL
O2 DELIVERY DEVICE: ABNORMAL
O2 SATURATION: 68.9 % (ref 92–98.5)
O2 SATURATION: 99.9 % (ref 92–98.5)
O2HB: 99 % (ref 94–97)
OPERATOR ID: 187
OPERATOR ID: 5123
PATIENT TEMP: 37 C
PATIENT TEMP: 37 C
PCO2: 46 MMHG (ref 35–45)
PCO2: 61.1 MMHG (ref 35–45)
PEEP/CPAP: 10 CMH2O
PEEP: 10
PFO2: 0.37 MMHG/%
PH BLOOD GAS: 7.41 (ref 7.35–7.45)
PH BLOOD GAS: 7.48 (ref 7.35–7.45)
PLATELET # BLD AUTO: 301 K/UL (ref 130–450)
PMV BLD AUTO: 10.3 FL (ref 7–12)
PO2: 36.9 MMHG (ref 75–100)
PO2: 385.7 MMHG (ref 75–100)
POC HCO3: 33.2 MMOL/L (ref 22–26)
POC HEMOGLOBIN (CALC): 11.5 G/DL (ref 12.5–15.5)
POC O2 SATURATION: 99.8 % (ref 92–98.5)
POC PCO2: 37.7 MM HG (ref 35–45)
POC PH: 7.55 (ref 7.35–7.45)
POC PO2: 208.9 MM HG (ref 60–80)
POSITIVE BASE EXCESS, ART: 10.2 MMOL/L (ref 0–3)
POTASSIUM SERPL-SCNC: 3.7 MMOL/L (ref 3.5–5)
PROT SERPL-MCNC: 7.5 G/DL (ref 6.4–8.3)
RBC # BLD AUTO: 4.11 M/UL (ref 3.5–5.5)
RBC # BLD: ABNORMAL 10*6/UL
RI(T): 15.99
RR MECHANICAL: 20 B/MIN
RSV RNA NPH QL NAA+NON-PROBE: NOT DETECTED
RV+EV RNA NPH QL NAA+NON-PROBE: NOT DETECTED
SARS-COV-2 RNA NPH QL NAA+NON-PROBE: NOT DETECTED
SET RATE, POC: 20
SODIUM SERPL-SCNC: 146 MMOL/L (ref 132–146)
SOURCE, BLOOD GAS: ABNORMAL
SOURCE, BLOOD GAS: ABNORMAL
SPECIMEN DESCRIPTION: NORMAL
THB: 11.3 G/DL (ref 11.5–16.5)
TIDAL VOLUME: 450
TIME ANALYZED: 1452
TIME ANALYZED: 1703
TROPONIN I SERPL HS-MCNC: 55 NG/L (ref 0–9)
TROPONIN I SERPL HS-MCNC: 66 NG/L (ref 0–9)
VT MECHANICAL: 450 ML
WBC OTHER # BLD: 9.3 K/UL (ref 4.5–11.5)

## 2023-07-26 PROCEDURE — 5A09457 ASSISTANCE WITH RESPIRATORY VENTILATION, 24-96 CONSECUTIVE HOURS, CONTINUOUS POSITIVE AIRWAY PRESSURE: ICD-10-PCS | Performed by: FAMILY MEDICINE

## 2023-07-26 PROCEDURE — 85027 COMPLETE CBC AUTOMATED: CPT

## 2023-07-26 PROCEDURE — 83880 ASSAY OF NATRIURETIC PEPTIDE: CPT

## 2023-07-26 PROCEDURE — 2580000003 HC RX 258

## 2023-07-26 PROCEDURE — 2580000003 HC RX 258: Performed by: STUDENT IN AN ORGANIZED HEALTH CARE EDUCATION/TRAINING PROGRAM

## 2023-07-26 PROCEDURE — 94660 CPAP INITIATION&MGMT: CPT

## 2023-07-26 PROCEDURE — 6360000002 HC RX W HCPCS: Performed by: STUDENT IN AN ORGANIZED HEALTH CARE EDUCATION/TRAINING PROGRAM

## 2023-07-26 PROCEDURE — 83735 ASSAY OF MAGNESIUM: CPT

## 2023-07-26 PROCEDURE — 84484 ASSAY OF TROPONIN QUANT: CPT

## 2023-07-26 PROCEDURE — 2700000000 HC OXYGEN THERAPY PER DAY

## 2023-07-26 PROCEDURE — 94640 AIRWAY INHALATION TREATMENT: CPT

## 2023-07-26 PROCEDURE — 93005 ELECTROCARDIOGRAM TRACING: CPT

## 2023-07-26 PROCEDURE — 71250 CT THORAX DX C-: CPT

## 2023-07-26 PROCEDURE — 6370000000 HC RX 637 (ALT 250 FOR IP): Performed by: STUDENT IN AN ORGANIZED HEALTH CARE EDUCATION/TRAINING PROGRAM

## 2023-07-26 PROCEDURE — 80053 COMPREHEN METABOLIC PANEL: CPT

## 2023-07-26 PROCEDURE — 82803 BLOOD GASES ANY COMBINATION: CPT

## 2023-07-26 PROCEDURE — 83605 ASSAY OF LACTIC ACID: CPT

## 2023-07-26 PROCEDURE — 71045 X-RAY EXAM CHEST 1 VIEW: CPT

## 2023-07-26 PROCEDURE — 99285 EMERGENCY DEPT VISIT HI MDM: CPT

## 2023-07-26 PROCEDURE — 0202U NFCT DS 22 TRGT SARS-COV-2: CPT

## 2023-07-26 PROCEDURE — 2060000000 HC ICU INTERMEDIATE R&B

## 2023-07-26 PROCEDURE — 6370000000 HC RX 637 (ALT 250 FOR IP)

## 2023-07-26 PROCEDURE — 87040 BLOOD CULTURE FOR BACTERIA: CPT

## 2023-07-26 PROCEDURE — 85014 HEMATOCRIT: CPT

## 2023-07-26 PROCEDURE — 82805 BLOOD GASES W/O2 SATURATION: CPT

## 2023-07-26 RX ORDER — IPRATROPIUM BROMIDE AND ALBUTEROL SULFATE 2.5; .5 MG/3ML; MG/3ML
1 SOLUTION RESPIRATORY (INHALATION)
Status: DISCONTINUED | OUTPATIENT
Start: 2023-07-26 | End: 2023-07-28 | Stop reason: HOSPADM

## 2023-07-26 RX ORDER — SODIUM CHLORIDE 9 MG/ML
INJECTION, SOLUTION INTRAVENOUS PRN
Status: DISCONTINUED | OUTPATIENT
Start: 2023-07-26 | End: 2023-07-27 | Stop reason: HOSPADM

## 2023-07-26 RX ORDER — GUAIFENESIN 400 MG/1
400 TABLET ORAL 4 TIMES DAILY PRN
Status: DISCONTINUED | OUTPATIENT
Start: 2023-07-26 | End: 2023-07-28 | Stop reason: HOSPADM

## 2023-07-26 RX ORDER — IPRATROPIUM BROMIDE AND ALBUTEROL SULFATE 2.5; .5 MG/3ML; MG/3ML
1 SOLUTION RESPIRATORY (INHALATION)
Status: COMPLETED | OUTPATIENT
Start: 2023-07-26 | End: 2023-07-26

## 2023-07-26 RX ORDER — DESMOPRESSIN ACETATE 0.1 MG/1
200 TABLET ORAL 2 TIMES DAILY
Status: DISCONTINUED | OUTPATIENT
Start: 2023-07-26 | End: 2023-07-27 | Stop reason: HOSPADM

## 2023-07-26 RX ORDER — LEVETIRACETAM 500 MG/1
500 TABLET ORAL 2 TIMES DAILY
Status: DISCONTINUED | OUTPATIENT
Start: 2023-07-26 | End: 2023-07-27 | Stop reason: HOSPADM

## 2023-07-26 RX ORDER — POLYETHYLENE GLYCOL 3350 17 G/17G
17 POWDER, FOR SOLUTION ORAL DAILY PRN
Status: DISCONTINUED | OUTPATIENT
Start: 2023-07-26 | End: 2023-07-27 | Stop reason: HOSPADM

## 2023-07-26 RX ORDER — ONDANSETRON 4 MG/1
4 TABLET, ORALLY DISINTEGRATING ORAL EVERY 8 HOURS PRN
Status: DISCONTINUED | OUTPATIENT
Start: 2023-07-26 | End: 2023-07-27 | Stop reason: HOSPADM

## 2023-07-26 RX ORDER — HYDRALAZINE HYDROCHLORIDE 25 MG/1
25 TABLET, FILM COATED ORAL EVERY 8 HOURS SCHEDULED
Status: DISCONTINUED | OUTPATIENT
Start: 2023-07-27 | End: 2023-07-28 | Stop reason: HOSPADM

## 2023-07-26 RX ORDER — FERROUS SULFATE 325(65) MG
325 TABLET ORAL 2 TIMES DAILY
Status: DISCONTINUED | OUTPATIENT
Start: 2023-07-26 | End: 2023-07-27 | Stop reason: HOSPADM

## 2023-07-26 RX ORDER — SODIUM CHLORIDE 0.9 % (FLUSH) 0.9 %
5-40 SYRINGE (ML) INJECTION EVERY 12 HOURS SCHEDULED
Status: DISCONTINUED | OUTPATIENT
Start: 2023-07-26 | End: 2023-07-28 | Stop reason: HOSPADM

## 2023-07-26 RX ORDER — FUROSEMIDE 20 MG/1
20 TABLET ORAL DAILY
Status: DISCONTINUED | OUTPATIENT
Start: 2023-07-27 | End: 2023-07-27 | Stop reason: HOSPADM

## 2023-07-26 RX ORDER — ONDANSETRON 2 MG/ML
4 INJECTION INTRAMUSCULAR; INTRAVENOUS EVERY 6 HOURS PRN
Status: DISCONTINUED | OUTPATIENT
Start: 2023-07-26 | End: 2023-07-27 | Stop reason: HOSPADM

## 2023-07-26 RX ORDER — AMLODIPINE BESYLATE 10 MG/1
10 TABLET ORAL DAILY
Status: DISCONTINUED | OUTPATIENT
Start: 2023-07-27 | End: 2023-07-28 | Stop reason: HOSPADM

## 2023-07-26 RX ORDER — ACETAMINOPHEN 325 MG/1
650 TABLET ORAL EVERY 6 HOURS PRN
Status: DISCONTINUED | OUTPATIENT
Start: 2023-07-26 | End: 2023-07-27 | Stop reason: HOSPADM

## 2023-07-26 RX ORDER — LEVOTHYROXINE SODIUM 0.07 MG/1
75 TABLET ORAL DAILY
Status: DISCONTINUED | OUTPATIENT
Start: 2023-07-27 | End: 2023-07-27

## 2023-07-26 RX ORDER — FAMOTIDINE 20 MG/1
20 TABLET, FILM COATED ORAL DAILY
Status: DISCONTINUED | OUTPATIENT
Start: 2023-07-27 | End: 2023-07-27 | Stop reason: HOSPADM

## 2023-07-26 RX ORDER — SODIUM CHLORIDE 0.9 % (FLUSH) 0.9 %
5-40 SYRINGE (ML) INJECTION PRN
Status: DISCONTINUED | OUTPATIENT
Start: 2023-07-26 | End: 2023-07-27 | Stop reason: HOSPADM

## 2023-07-26 RX ORDER — ACETAMINOPHEN 650 MG/1
650 SUPPOSITORY RECTAL EVERY 6 HOURS PRN
Status: DISCONTINUED | OUTPATIENT
Start: 2023-07-26 | End: 2023-07-27 | Stop reason: HOSPADM

## 2023-07-26 RX ORDER — PREDNISONE 20 MG/1
40 TABLET ORAL
Status: DISCONTINUED | OUTPATIENT
Start: 2023-07-27 | End: 2023-07-28 | Stop reason: HOSPADM

## 2023-07-26 RX ORDER — DIGOXIN 125 MCG
125 TABLET ORAL DAILY
Status: DISCONTINUED | OUTPATIENT
Start: 2023-07-27 | End: 2023-07-28 | Stop reason: HOSPADM

## 2023-07-26 RX ORDER — AMIODARONE HYDROCHLORIDE 200 MG/1
200 TABLET ORAL DAILY
Status: DISCONTINUED | OUTPATIENT
Start: 2023-07-27 | End: 2023-07-27 | Stop reason: HOSPADM

## 2023-07-26 RX ADMIN — IPRATROPIUM BROMIDE AND ALBUTEROL SULFATE 1 DOSE: .5; 3 SOLUTION RESPIRATORY (INHALATION) at 14:43

## 2023-07-26 RX ADMIN — IPRATROPIUM BROMIDE AND ALBUTEROL SULFATE 1 DOSE: .5; 3 SOLUTION RESPIRATORY (INHALATION) at 14:42

## 2023-07-26 RX ADMIN — IPRATROPIUM BROMIDE AND ALBUTEROL SULFATE 1 DOSE: .5; 3 SOLUTION RESPIRATORY (INHALATION) at 14:44

## 2023-07-26 RX ADMIN — VANCOMYCIN HYDROCHLORIDE 1250 MG: 10 INJECTION, POWDER, LYOPHILIZED, FOR SOLUTION INTRAVENOUS at 18:20

## 2023-07-26 RX ADMIN — CEFEPIME 2000 MG: 2 INJECTION, POWDER, FOR SOLUTION INTRAVENOUS at 17:44

## 2023-07-26 RX ADMIN — IPRATROPIUM BROMIDE AND ALBUTEROL SULFATE 1 DOSE: .5; 2.5 SOLUTION RESPIRATORY (INHALATION) at 22:49

## 2023-07-26 RX ADMIN — METHYLPREDNISOLONE SODIUM SUCCINATE 125 MG: 125 INJECTION, POWDER, LYOPHILIZED, FOR SOLUTION INTRAMUSCULAR; INTRAVENOUS at 15:06

## 2023-07-26 RX ADMIN — Medication 10 ML: at 21:28

## 2023-07-26 ASSESSMENT — PAIN - FUNCTIONAL ASSESSMENT: PAIN_FUNCTIONAL_ASSESSMENT: NONE - DENIES PAIN

## 2023-07-26 NOTE — PROGRESS NOTES
Pharmacy Consultation Note  (Antibiotic Dosing and Monitoring)    Initial consult date: 7/26/23  Consulting physician/provider: Prince Halsted  Drug: Vancomycin  Indication: HAP    Age/  Gender Height Weight IBW  Allergy Information   67 y.o./female 165.1 cm      Ideal body weight: 57 kg (125 lb 10.6 oz)  Adjusted ideal body weight: 59.8 kg (131 lb 12.8 oz)   Patient has no known allergies. Renal Function:  Recent Labs     07/24/23  0510 07/26/23  1449   BUN 27* 25*   CREATININE 1.5* 1.4*     No intake or output data in the 24 hours ending 07/26/23 1906    Vancomycin Monitoring:  Trough:  No results for input(s): VANCOTROUGH in the last 72 hours. Random:  No results for input(s): VANCORANDOM in the last 72 hours. No results for input(s): Lexi Booty in the last 72 hours. Historical Cultures:  Organism   Date Value Ref Range Status   07/05/2023 MRSA nasal colonization (A)  Final     No results for input(s): BC in the last 72 hours. Vancomycin Administration Times:  Recent vancomycin administrations                     vancomycin (VANCOCIN) 1,250 mg in sodium chloride 0.9 % 250 mL IVPB (mg) 1,250 mg New Bag 07/26/23 1820                    Assessment:  Patient is a 78 y.o. female who has been initiated on vancomycin  Estimated Creatinine Clearance: 36 mL/min (A) (based on SCr of 1.4 mg/dL (H)). To dose vancomycin, pharmacy will be utilizing     Plan:   Will continue vancomycin 1000 mg IV every 24 hours  Will check vancomycin levels when appropriate  Will continue to monitor renal function   Pharmacy to follow      Biju Vizcaino [facility] 7/26/2023 7:06 PM

## 2023-07-26 NOTE — ED PROVIDER NOTES
[address] [facility]        Pt Name: Prabhjot Farrar  MRN: [id number]  [address] [facility] 1955  Date of evaluation: 7/26/2023  Provider: Tirso Cotton DO  PCP: Beni Coelho MD  Note Started: 2:28 PM EDT 7/26/23    CHIEF COMPLAINT       Chief Complaint   Patient presents with    Shortness of Breath     Sob, sp02 60%, cough for 2 months, now bright red blood in mucous        HISTORY OF PRESENT ILLNESS: 1 or more Elements   History From: ems    Limitations to history : respiratory distress    Prabhjot Farrar is a 78 y.o. female With a history of CHF, COPD chronically on nasal cannula oxygen who arrives to the emergency department via EMS for hypoxia and respiratory distress. History and review of systems is limited as she was in respiratory distress on arrival.  Apparently per EMS she was 60% on room air prior to arrival.  She has been experiencing a cough over the past 2 months and now developed bright red blood in the mucus. She was just recently admitted for pneumonia and discharged per chart review. History review of systems is otherwise limited as patient is in significant respiratory distress on arrival.    Nursing Notes were all reviewed and agreed with or any disagreements were addressed in the HPI. REVIEW OF SYSTEMS :      Review of Systems    Positives and Pertinent negatives as per HPI. SURGICAL HISTORY     Past Surgical History:   Procedure Laterality Date    ABDOMEN SURGERY  2008    ischemic colitis    COLONOSCOPY  2008    healed colitis    COLONOSCOPY  04/11/2014    COLONOSCOPY  11/23/2015    severe colitis    COLONOSCOPY  10/24/2016    COLONOSCOPY  07/26/2017    ECHO COMPL W DOP COLOR FLOW  8/16/2015         ECHO COMPLETE  4/22/2013         FRACTURE SURGERY  2010    Tibial right    HEMORRHOID SURGERY  12/08/2016    KYPHOSIS SURGERY      For comp.  fx T10    LUMBAR DISC SURGERY      OTHER SURGICAL HISTORY  11/1/11    removal r leg external fixator

## 2023-07-26 NOTE — PROGRESS NOTES
07/26/23 1446   NIV Type   $NIV $Daily Charge   NIV Started/Stopped On   Equipment Type V60   Mode AVAPS   Mask Type Full face mask   Mask Size Medium   Assessment   Pulse 73   Respirations 21   Comfort Level Good   Using Accessory Muscles No   Mask Compliance Good   Skin Assessment Skin breakdown present (see comment/note)   Skin Protection for O2 Device N/A   Settings/Measurements   PIP Observed 19 cm H20   CPAP/EPAP 10 cmH2O   IPAP Min 13 cmH2O   IPAP Max 21 cmH2O   Vt (Measured) 349 mL   Rate Ordered 20   Insp Rise Time (%) 3 %   FiO2  100 %   I Time/ I Time % 0.9 s   Minute Volume (L/min) 7.5 Liters   Mask Leak (lpm) 39 lpm   Alarm Settings   Alarms On Y   Low Pressure (cmH2O) 8 cmH2O   High Pressure (cmH2O) 30 cmH2O   Apnea (secs) 20 secs   RR Low (bpm) 16   RR High (bpm) 35 br/min     Date: 7/26/2023    Time: 2:49 PM    Patient Placed On BIPAP/CPAP/ Non-Invasive Ventilation? Yes  Facial area red/color change? No     If YES are Blister/Lesion present? No    BIPAP/CPAP skin barrier? No   Skin barrier type: emergent      Comments:    Ashley Serrato RCP RRT-ACCS

## 2023-07-26 NOTE — H&P
34.5 g/dL    RDW 14.9 11.5 - 15.0 %    Platelets 057 677 - 276 k/uL    MPV 10.3 7.0 - 12.0 fL    Neutrophils % PENDING %    Lymphocytes % PENDING %    Monocytes % PENDING %    Eosinophils % PENDING %    Basophils % PENDING %    Immature Granulocytes PENDING 0 %    Metamyelocytes PENDING 0 %    Myelocytes PENDING 0 %    Promyelocytes PENDING 0 %    Blasts PENDING 0 %    Atypical Lymphocytes PENDING %    Plasma Cells PENDING %    nRBC PENDING per 100 WBC    Neutrophils Absolute PENDING k/uL    Lymphocytes Absolute PENDING k/uL    Monocytes Absolute PENDING k/uL    Eosinophils Absolute PENDING k/uL    Basophils Absolute PENDING 0.0 - 0.2 k/uL    Absolute Immature Granulocyte PENDING 0.00 - 0.30 k/uL    Absolute Bands # PENDING k/uL    Metamyelocytes Absolute PENDING 0 k/uL    Myelocytes Absolute PENDING 0 k/uL    Promyelocytes Absolute PENDING 0 k/uL    Blasts Absolute PENDING 0 k/uL    Atypical Lymphocytes Absolute PENDING k/uL    ABSOLUTE PLASMA CELLS PENDING k/uL    WBC Morphology PENDING     RBC Morphology PENDING     Platelet Estimate PENDING    Comprehensive Metabolic Panel w/ Reflex to MG   Result Value Ref Range    Glucose 113 (H) 74 - 99 mg/dL    BUN 25 (H) 6 - 23 mg/dL    Creatinine 1.4 (H) 0.50 - 1.00 mg/dL    Est, Glom Filt Rate 42 (L) >60 mL/min/1.73m2    Calcium 9.7 8.6 - 10.2 mg/dL    Sodium 146 132 - 146 mmol/L    Potassium 3.7 3.5 - 5.0 mmol/L    Chloride 101 98 - 107 mmol/L    CO2 35 (H) 22 - 29 mmol/L    Anion Gap 10 7 - 16 mmol/L    Alkaline Phosphatase 94 35 - 104 U/L    ALT 11 0 - 32 U/L    AST 17 0 - 31 U/L    Total Bilirubin 0.3 0.0 - 1.2 mg/dL    Total Protein 7.5 6.4 - 8.3 g/dL    Albumin 3.8 3.5 - 5.2 g/dL   Blood Gas, Arterial   Result Value Ref Range    Date Analyzed [id number]     Time Analyzed 1452     Source: Blood Arterial     pH, Blood Gas 7.477 (H) 7.350 - 7.450    PCO2 46.0 (H) 35.0 - 45.0 mmHg    PO2 385.7 (H) 75.0 - 100.0 mmHg    HCO3 33.3 (H) 22.0 - 26.0 mmol/L    B.E. 8.7 (H) -3.0 effusion. The thoracic esophagus is decompressed, limiting assessment. No obvious esophageal abnormality. Visible but nonenlarged mediastinal lymph nodes are present. Lungs/pleura: The central airways are patent. Moderate emphysematous changes are present bilaterally. Left lower lobe as well as lingular consolidative airspace opacities are present. Small amount of respiratory debris/secretions are present within both the lingular and left lower lobe bronchial tree. No evidence of a pleural effusion or pneumothorax. Calcified granuloma in the right upper lobe is noted. Subpleural mild linear opacities with possible associated bronchiectasis in the right upper lobe is stable and likely reflective of pulmonary parenchymal scarring. Upper Abdomen:  Limited images of the upper abdomen demonstrate no acute abnormality. Soft Tissues/Bones: No acute osseous abnormality or evidence of an aggressive osseous lesion. Kyphoplasty cement is present within the T10 vertebral body. Advanced degenerative changes of the right glenohumeral joint and moderate degenerative changes of the left glenohumeral joint are present. An age-indeterminate but subacute to chronic appearing anterior compression fracture deformity of T5 and T7 are stable. No evidence of a pulmonary embolism. Since June 14, 2023, there has been development of consolidative and patchy airspace opacities in the lingula and left lower lobe, suspicious for multifocal pneumonia. Background of emphysematous changes throughout both lungs. Enlargement of the main pulmonary artery up to 4.2 cm. This is nonspecific but can be seen with pulmonary artery hypertension. Multiple compression fracture deformities of the thoracic spine including an age-indeterminate but subacute to chronic appearing mild anterior compression fracture at T5 and moderate anterior compression fracture deformity at T7, not significantly changed in appearance compared with June 14, 2023.

## 2023-07-27 ENCOUNTER — APPOINTMENT (OUTPATIENT)
Dept: GENERAL RADIOLOGY | Age: 68
DRG: 193 | End: 2023-07-27
Payer: MEDICARE

## 2023-07-27 LAB
ANION GAP SERPL CALCULATED.3IONS-SCNC: 14 MMOL/L (ref 7–16)
BASOPHILS # BLD: 0.01 K/UL (ref 0–0.2)
BASOPHILS NFR BLD: 0 % (ref 0–2)
BNP SERPL-MCNC: 3085 PG/ML (ref 0–125)
BUN SERPL-MCNC: 28 MG/DL (ref 6–23)
CALCIUM SERPL-MCNC: 9.5 MG/DL (ref 8.6–10.2)
CHLORIDE SERPL-SCNC: 104 MMOL/L (ref 98–107)
CO2 SERPL-SCNC: 28 MMOL/L (ref 22–29)
CREAT SERPL-MCNC: 1.5 MG/DL (ref 0.5–1)
D DIMER: 344 NG/ML DDU (ref 0–232)
EKG ATRIAL RATE: 81 BPM
EKG ATRIAL RATE: 82 BPM
EKG P AXIS: 21 DEGREES
EKG P AXIS: 54 DEGREES
EKG P-R INTERVAL: 140 MS
EKG P-R INTERVAL: 142 MS
EKG Q-T INTERVAL: 324 MS
EKG Q-T INTERVAL: 346 MS
EKG QRS DURATION: 76 MS
EKG QRS DURATION: 84 MS
EKG QTC CALCULATION (BAZETT): 378 MS
EKG QTC CALCULATION (BAZETT): 401 MS
EKG R AXIS: 75 DEGREES
EKG R AXIS: 80 DEGREES
EKG T AXIS: -170 DEGREES
EKG T AXIS: 141 DEGREES
EKG VENTRICULAR RATE: 81 BPM
EKG VENTRICULAR RATE: 82 BPM
EOSINOPHIL # BLD: 0 K/UL (ref 0.05–0.5)
EOSINOPHILS RELATIVE PERCENT: 0 % (ref 0–6)
ERYTHROCYTE [DISTWIDTH] IN BLOOD BY AUTOMATED COUNT: 15.2 % (ref 11.5–15)
GFR SERPL CREATININE-BSD FRML MDRD: 39 ML/MIN/1.73M2
GLUCOSE SERPL-MCNC: 90 MG/DL (ref 74–99)
HCT VFR BLD AUTO: 35.3 % (ref 34–48)
HGB BLD-MCNC: 9.8 G/DL (ref 11.5–15.5)
IMM GRANULOCYTES # BLD AUTO: 0.04 K/UL (ref 0–0.58)
IMM GRANULOCYTES NFR BLD: 1 % (ref 0–5)
LYMPHOCYTES NFR BLD: 0.45 K/UL (ref 1.5–4)
LYMPHOCYTES RELATIVE PERCENT: 8 % (ref 20–42)
MCH RBC QN AUTO: 25.1 PG (ref 26–35)
MCHC RBC AUTO-ENTMCNC: 27.8 G/DL (ref 32–34.5)
MCV RBC AUTO: 90.5 FL (ref 80–99.9)
MONOCYTES NFR BLD: 0.1 K/UL (ref 0.1–0.95)
MONOCYTES NFR BLD: 2 % (ref 2–12)
NEUTROPHILS NFR BLD: 90 % (ref 43–80)
NEUTS SEG NFR BLD: 5.19 K/UL (ref 1.8–7.3)
PLATELET # BLD AUTO: 227 K/UL (ref 130–450)
PMV BLD AUTO: 10.6 FL (ref 7–12)
POTASSIUM SERPL-SCNC: 4.6 MMOL/L (ref 3.5–5)
RBC # BLD AUTO: 3.9 M/UL (ref 3.5–5.5)
RBC # BLD: ABNORMAL 10*6/UL
SODIUM SERPL-SCNC: 146 MMOL/L (ref 132–146)
TROPONIN I SERPL HS-MCNC: 45 NG/L (ref 0–9)
WBC OTHER # BLD: 5.8 K/UL (ref 4.5–11.5)

## 2023-07-27 PROCEDURE — 85379 FIBRIN DEGRADATION QUANT: CPT

## 2023-07-27 PROCEDURE — 2580000003 HC RX 258

## 2023-07-27 PROCEDURE — 83880 ASSAY OF NATRIURETIC PEPTIDE: CPT

## 2023-07-27 PROCEDURE — 84484 ASSAY OF TROPONIN QUANT: CPT

## 2023-07-27 PROCEDURE — 87070 CULTURE OTHR SPECIMN AEROBIC: CPT

## 2023-07-27 PROCEDURE — 85027 COMPLETE CBC AUTOMATED: CPT

## 2023-07-27 PROCEDURE — 99223 1ST HOSP IP/OBS HIGH 75: CPT | Performed by: FAMILY MEDICINE

## 2023-07-27 PROCEDURE — 87205 SMEAR GRAM STAIN: CPT

## 2023-07-27 PROCEDURE — 6360000002 HC RX W HCPCS

## 2023-07-27 PROCEDURE — 97535 SELF CARE MNGMENT TRAINING: CPT

## 2023-07-27 PROCEDURE — 80048 BASIC METABOLIC PNL TOTAL CA: CPT

## 2023-07-27 PROCEDURE — 93005 ELECTROCARDIOGRAM TRACING: CPT

## 2023-07-27 PROCEDURE — 87449 NOS EACH ORGANISM AG IA: CPT

## 2023-07-27 PROCEDURE — 97161 PT EVAL LOW COMPLEX 20 MIN: CPT

## 2023-07-27 PROCEDURE — 6370000000 HC RX 637 (ALT 250 FOR IP): Performed by: INTERNAL MEDICINE

## 2023-07-27 PROCEDURE — 2580000003 HC RX 258: Performed by: INTERNAL MEDICINE

## 2023-07-27 PROCEDURE — 97165 OT EVAL LOW COMPLEX 30 MIN: CPT

## 2023-07-27 PROCEDURE — 71045 X-RAY EXAM CHEST 1 VIEW: CPT

## 2023-07-27 PROCEDURE — 2060000000 HC ICU INTERMEDIATE R&B

## 2023-07-27 PROCEDURE — 87077 CULTURE AEROBIC IDENTIFY: CPT

## 2023-07-27 PROCEDURE — 6370000000 HC RX 637 (ALT 250 FOR IP)

## 2023-07-27 PROCEDURE — 2700000000 HC OXYGEN THERAPY PER DAY

## 2023-07-27 PROCEDURE — 87899 AGENT NOS ASSAY W/OPTIC: CPT

## 2023-07-27 PROCEDURE — 87181 SC STD AGAR DILUTION PER AGT: CPT

## 2023-07-27 PROCEDURE — 6360000002 HC RX W HCPCS: Performed by: INTERNAL MEDICINE

## 2023-07-27 PROCEDURE — 94660 CPAP INITIATION&MGMT: CPT

## 2023-07-27 PROCEDURE — 94640 AIRWAY INHALATION TREATMENT: CPT

## 2023-07-27 PROCEDURE — 97530 THERAPEUTIC ACTIVITIES: CPT

## 2023-07-27 PROCEDURE — 36415 COLL VENOUS BLD VENIPUNCTURE: CPT

## 2023-07-27 RX ORDER — SODIUM CHLORIDE 0.9 % (FLUSH) 0.9 %
5-40 SYRINGE (ML) INJECTION EVERY 12 HOURS SCHEDULED
Status: DISCONTINUED | OUTPATIENT
Start: 2023-07-27 | End: 2023-07-28 | Stop reason: HOSPADM

## 2023-07-27 RX ORDER — FUROSEMIDE 10 MG/ML
40 INJECTION INTRAMUSCULAR; INTRAVENOUS DAILY
Status: DISCONTINUED | OUTPATIENT
Start: 2023-07-28 | End: 2023-07-27

## 2023-07-27 RX ORDER — ONDANSETRON 2 MG/ML
4 INJECTION INTRAMUSCULAR; INTRAVENOUS EVERY 6 HOURS PRN
Status: DISCONTINUED | OUTPATIENT
Start: 2023-07-27 | End: 2023-07-28 | Stop reason: HOSPADM

## 2023-07-27 RX ORDER — LEVOTHYROXINE SODIUM 0.07 MG/1
75 TABLET ORAL DAILY
Status: DISCONTINUED | OUTPATIENT
Start: 2023-07-27 | End: 2023-07-27 | Stop reason: HOSPADM

## 2023-07-27 RX ORDER — ONDANSETRON 4 MG/1
4 TABLET, ORALLY DISINTEGRATING ORAL EVERY 8 HOURS PRN
Status: DISCONTINUED | OUTPATIENT
Start: 2023-07-27 | End: 2023-07-28 | Stop reason: HOSPADM

## 2023-07-27 RX ORDER — ARFORMOTEROL TARTRATE 15 UG/2ML
15 SOLUTION RESPIRATORY (INHALATION)
Status: DISCONTINUED | OUTPATIENT
Start: 2023-07-27 | End: 2023-07-28 | Stop reason: HOSPADM

## 2023-07-27 RX ORDER — FUROSEMIDE 10 MG/ML
40 INJECTION INTRAMUSCULAR; INTRAVENOUS 2 TIMES DAILY
Status: DISCONTINUED | OUTPATIENT
Start: 2023-07-27 | End: 2023-07-27

## 2023-07-27 RX ORDER — FAMOTIDINE 20 MG/1
20 TABLET, FILM COATED ORAL 2 TIMES DAILY
Status: DISCONTINUED | OUTPATIENT
Start: 2023-07-27 | End: 2023-07-27

## 2023-07-27 RX ORDER — PREDNISONE 5 MG/1
5 TABLET ORAL DAILY
Status: DISCONTINUED | OUTPATIENT
Start: 2023-08-03 | End: 2023-07-28 | Stop reason: HOSPADM

## 2023-07-27 RX ORDER — SODIUM CHLORIDE 9 MG/ML
INJECTION, SOLUTION INTRAVENOUS PRN
Status: DISCONTINUED | OUTPATIENT
Start: 2023-07-27 | End: 2023-07-28 | Stop reason: HOSPADM

## 2023-07-27 RX ORDER — LEVOTHYROXINE SODIUM 0.07 MG/1
75 TABLET ORAL DAILY
Status: DISCONTINUED | OUTPATIENT
Start: 2023-07-28 | End: 2023-07-28 | Stop reason: HOSPADM

## 2023-07-27 RX ORDER — FAMOTIDINE 20 MG/1
20 TABLET, FILM COATED ORAL DAILY
Status: DISCONTINUED | OUTPATIENT
Start: 2023-07-28 | End: 2023-07-28 | Stop reason: HOSPADM

## 2023-07-27 RX ORDER — ACETAMINOPHEN 650 MG/1
650 SUPPOSITORY RECTAL EVERY 6 HOURS PRN
Status: DISCONTINUED | OUTPATIENT
Start: 2023-07-27 | End: 2023-07-28 | Stop reason: HOSPADM

## 2023-07-27 RX ORDER — ACETAMINOPHEN 325 MG/1
650 TABLET ORAL EVERY 6 HOURS PRN
Status: DISCONTINUED | OUTPATIENT
Start: 2023-07-27 | End: 2023-07-28 | Stop reason: HOSPADM

## 2023-07-27 RX ORDER — POLYVINYL ALCOHOL 14 MG/ML
1 SOLUTION/ DROPS OPHTHALMIC PRN
Status: DISCONTINUED | OUTPATIENT
Start: 2023-07-27 | End: 2023-07-28 | Stop reason: HOSPADM

## 2023-07-27 RX ORDER — FERROUS SULFATE 325(65) MG
325 TABLET ORAL
Status: DISCONTINUED | OUTPATIENT
Start: 2023-07-27 | End: 2023-07-28 | Stop reason: HOSPADM

## 2023-07-27 RX ORDER — IPRATROPIUM BROMIDE AND ALBUTEROL SULFATE 2.5; .5 MG/3ML; MG/3ML
1 SOLUTION RESPIRATORY (INHALATION) EVERY 4 HOURS PRN
Status: DISCONTINUED | OUTPATIENT
Start: 2023-07-27 | End: 2023-07-28 | Stop reason: HOSPADM

## 2023-07-27 RX ORDER — METOPROLOL SUCCINATE 50 MG/1
50 TABLET, EXTENDED RELEASE ORAL 2 TIMES DAILY
Status: DISCONTINUED | OUTPATIENT
Start: 2023-07-27 | End: 2023-07-28 | Stop reason: HOSPADM

## 2023-07-27 RX ORDER — ENOXAPARIN SODIUM 100 MG/ML
40 INJECTION SUBCUTANEOUS DAILY
Status: DISCONTINUED | OUTPATIENT
Start: 2023-07-27 | End: 2023-07-27 | Stop reason: ALTCHOICE

## 2023-07-27 RX ORDER — SODIUM CHLORIDE 0.9 % (FLUSH) 0.9 %
5-40 SYRINGE (ML) INJECTION PRN
Status: DISCONTINUED | OUTPATIENT
Start: 2023-07-27 | End: 2023-07-28 | Stop reason: HOSPADM

## 2023-07-27 RX ORDER — DOCUSATE SODIUM 100 MG/1
100 CAPSULE, LIQUID FILLED ORAL 2 TIMES DAILY
Status: DISCONTINUED | OUTPATIENT
Start: 2023-07-27 | End: 2023-07-28 | Stop reason: HOSPADM

## 2023-07-27 RX ORDER — DESMOPRESSIN ACETATE 0.1 MG/1
200 TABLET ORAL 2 TIMES DAILY
Status: DISCONTINUED | OUTPATIENT
Start: 2023-07-27 | End: 2023-07-28 | Stop reason: HOSPADM

## 2023-07-27 RX ORDER — LEVETIRACETAM 500 MG/1
500 TABLET ORAL 2 TIMES DAILY
Status: DISCONTINUED | OUTPATIENT
Start: 2023-07-27 | End: 2023-07-28 | Stop reason: HOSPADM

## 2023-07-27 RX ORDER — POLYETHYLENE GLYCOL 3350 17 G/17G
17 POWDER, FOR SOLUTION ORAL DAILY PRN
Status: DISCONTINUED | OUTPATIENT
Start: 2023-07-27 | End: 2023-07-28 | Stop reason: HOSPADM

## 2023-07-27 RX ORDER — DULOXETIN HYDROCHLORIDE 30 MG/1
60 CAPSULE, DELAYED RELEASE ORAL DAILY
Status: DISCONTINUED | OUTPATIENT
Start: 2023-07-27 | End: 2023-07-28 | Stop reason: HOSPADM

## 2023-07-27 RX ORDER — BISACODYL 10 MG
10 SUPPOSITORY, RECTAL RECTAL
Status: DISCONTINUED | OUTPATIENT
Start: 2023-07-27 | End: 2023-07-28 | Stop reason: HOSPADM

## 2023-07-27 RX ORDER — AMIODARONE HYDROCHLORIDE 200 MG/1
200 TABLET ORAL DAILY
Status: DISCONTINUED | OUTPATIENT
Start: 2023-07-27 | End: 2023-07-28 | Stop reason: HOSPADM

## 2023-07-27 RX ORDER — FUROSEMIDE 10 MG/ML
20 INJECTION INTRAMUSCULAR; INTRAVENOUS DAILY
Status: DISCONTINUED | OUTPATIENT
Start: 2023-07-28 | End: 2023-07-28 | Stop reason: HOSPADM

## 2023-07-27 RX ORDER — BUDESONIDE 0.25 MG/2ML
0.25 INHALANT ORAL
Status: DISCONTINUED | OUTPATIENT
Start: 2023-07-27 | End: 2023-07-28 | Stop reason: HOSPADM

## 2023-07-27 RX ORDER — HYDROXYZINE PAMOATE 25 MG/1
25 CAPSULE ORAL 3 TIMES DAILY PRN
Status: DISCONTINUED | OUTPATIENT
Start: 2023-07-27 | End: 2023-07-28 | Stop reason: HOSPADM

## 2023-07-27 RX ADMIN — SODIUM CHLORIDE, PRESERVATIVE FREE 10 ML: 5 INJECTION INTRAVENOUS at 21:02

## 2023-07-27 RX ADMIN — BUDESONIDE 250 MCG: 0.25 SUSPENSION RESPIRATORY (INHALATION) at 07:42

## 2023-07-27 RX ADMIN — IPRATROPIUM BROMIDE AND ALBUTEROL SULFATE 1 DOSE: .5; 2.5 SOLUTION RESPIRATORY (INHALATION) at 07:42

## 2023-07-27 RX ADMIN — FERROUS SULFATE TAB 325 MG (65 MG ELEMENTAL FE) 325 MG: 325 (65 FE) TAB at 10:07

## 2023-07-27 RX ADMIN — IPRATROPIUM BROMIDE AND ALBUTEROL SULFATE 1 DOSE: .5; 2.5 SOLUTION RESPIRATORY (INHALATION) at 15:40

## 2023-07-27 RX ADMIN — DIGOXIN 125 MCG: 125 TABLET ORAL at 10:08

## 2023-07-27 RX ADMIN — DOCUSATE SODIUM 100 MG: 100 CAPSULE, LIQUID FILLED ORAL at 21:00

## 2023-07-27 RX ADMIN — BUDESONIDE 250 MCG: 0.25 SUSPENSION RESPIRATORY (INHALATION) at 20:34

## 2023-07-27 RX ADMIN — DULOXETINE HYDROCHLORIDE 60 MG: 30 CAPSULE, DELAYED RELEASE ORAL at 10:07

## 2023-07-27 RX ADMIN — DOCUSATE SODIUM 100 MG: 100 CAPSULE, LIQUID FILLED ORAL at 10:07

## 2023-07-27 RX ADMIN — APIXABAN 5 MG: 5 TABLET, FILM COATED ORAL at 21:00

## 2023-07-27 RX ADMIN — ARFORMOTEROL TARTRATE 15 MCG: 15 SOLUTION RESPIRATORY (INHALATION) at 07:42

## 2023-07-27 RX ADMIN — IPRATROPIUM BROMIDE AND ALBUTEROL SULFATE 1 DOSE: .5; 2.5 SOLUTION RESPIRATORY (INHALATION) at 20:34

## 2023-07-27 RX ADMIN — AMIODARONE HYDROCHLORIDE 200 MG: 200 TABLET ORAL at 10:08

## 2023-07-27 RX ADMIN — CEFEPIME 2000 MG: 2 INJECTION, POWDER, FOR SOLUTION INTRAVENOUS at 18:50

## 2023-07-27 RX ADMIN — PREDNISONE 40 MG: 20 TABLET ORAL at 12:16

## 2023-07-27 RX ADMIN — DESMOPRESSIN ACETATE 200 MCG: 0.1 TABLET ORAL at 10:08

## 2023-07-27 RX ADMIN — AMLODIPINE BESYLATE 10 MG: 10 TABLET ORAL at 10:08

## 2023-07-27 RX ADMIN — FERROUS SULFATE TAB 325 MG (65 MG ELEMENTAL FE) 325 MG: 325 (65 FE) TAB at 18:45

## 2023-07-27 RX ADMIN — HYDRALAZINE HYDROCHLORIDE 25 MG: 25 TABLET, FILM COATED ORAL at 21:00

## 2023-07-27 RX ADMIN — FAMOTIDINE 20 MG: 20 TABLET, FILM COATED ORAL at 10:07

## 2023-07-27 RX ADMIN — ARFORMOTEROL TARTRATE 15 MCG: 15 SOLUTION RESPIRATORY (INHALATION) at 20:34

## 2023-07-27 RX ADMIN — METOPROLOL SUCCINATE 50 MG: 50 TABLET, EXTENDED RELEASE ORAL at 10:07

## 2023-07-27 RX ADMIN — FUROSEMIDE 40 MG: 10 INJECTION, SOLUTION INTRAMUSCULAR; INTRAVENOUS at 10:06

## 2023-07-27 RX ADMIN — IPRATROPIUM BROMIDE AND ALBUTEROL SULFATE 1 DOSE: .5; 2.5 SOLUTION RESPIRATORY (INHALATION) at 11:11

## 2023-07-27 RX ADMIN — HYDRALAZINE HYDROCHLORIDE 25 MG: 25 TABLET, FILM COATED ORAL at 13:38

## 2023-07-27 RX ADMIN — LEVETIRACETAM 500 MG: 500 TABLET, FILM COATED ORAL at 21:00

## 2023-07-27 RX ADMIN — DESMOPRESSIN ACETATE 200 MCG: 0.1 TABLET ORAL at 21:08

## 2023-07-27 RX ADMIN — APIXABAN 5 MG: 5 TABLET, FILM COATED ORAL at 10:07

## 2023-07-27 RX ADMIN — METOPROLOL SUCCINATE 50 MG: 50 TABLET, EXTENDED RELEASE ORAL at 21:00

## 2023-07-27 RX ADMIN — VANCOMYCIN HYDROCHLORIDE 1000 MG: 1 INJECTION, POWDER, LYOPHILIZED, FOR SOLUTION INTRAVENOUS at 17:50

## 2023-07-27 RX ADMIN — CEFEPIME 2000 MG: 2 INJECTION, POWDER, FOR SOLUTION INTRAVENOUS at 06:25

## 2023-07-27 RX ADMIN — SODIUM CHLORIDE, PRESERVATIVE FREE 10 ML: 5 INJECTION INTRAVENOUS at 10:09

## 2023-07-27 RX ADMIN — LEVETIRACETAM 500 MG: 500 TABLET, FILM COATED ORAL at 10:08

## 2023-07-27 ASSESSMENT — PAIN SCALES - GENERAL: PAINLEVEL_OUTOF10: 0

## 2023-07-27 NOTE — PROGRESS NOTES
Pt incontinent. Unable to collect ordered urine samples at this time. Gurdeep placed and pt educated on need to call when she urinates so samples can be collected.     Electronically signed by Amy Aggarwal RN on 7/27/23 at 11:09 AM EDT

## 2023-07-27 NOTE — PROGRESS NOTES
[facility] Inpatient   Resident Progress Note    S:  Hospital day: 1   Brief Synopsis: Arelis Duarte is a 78 y.o. female with a PMH of pulmonary sarcoidosis, chronic right IJ thrombus, atrial fibrillation, pulmonary hypertension, nephrogenic diabetes insipidus, hypothyroidism, HTN, CKD IIIa, and combined systolic/diastolic heart failure who presented to the ED for shortness of breath and cough productive of green sputum/hemoptysis. The patient had been recently admitted to [address] [place] [address] from 7/24/23-7/27/23 for CHF exacerbation. Prior to that, the patient had been admitted to [address] [place] [address] from 7/5/23-7/19/23 for acute on chronic hypoxic respiratory faiilure 2/2 pneumonia. The patient was admitted for acute hypoxic respiratory failure and placed on BiPAP. Workup in the ED included CT chest that showed multiple patchy opacities concerning for pneumonia. Patient was started on vancomycin and cefepime for hospital-acquired pneumonia. Overnight/interim:   The patient states that her breathing is currently at baseline and she is no longer experiencing the shortness of breath that prompted her to return to the hospital  She admits that her breathing difficulties wax and wane and she cannot find an apparent trigger for when the shortness of breath occurs  Patient also states that she is still having significant hemoptysis, to the degree that \"it looks like I'm bleeding. \" Also reports having vaginal bleeding  Patient reported one episode of chest pain this AM          Cont meds:    sodium chloride       Scheduled meds:    sodium chloride flush  5-40 mL IntraVENous 2 times per day    amiodarone  200 mg Oral Daily    desmopressin  200 mcg Oral BID    docusate sodium  100 mg Oral BID    DULoxetine  60 mg Oral Daily    famotidine  20 mg Oral BID    ferrous sulfate  325 mg Oral BID PC    levETIRAcetam  500 mg Oral BID    [START ON 7/28/2023] levothyroxine  75 mcg Oral Daily pneumonia vs. Atelectasis from long-term immobility vs. Pulmonary hypertension  Cough productive of green sputum w/ hemoptysis concerning for hospital acquired pneumonia- started vancomycin and cefepime  Respiratory culture pending  Pulmonology consulted for management of pulmonary hypertension  Patient's breathing is improving, currently saturating appropriately on home oxygen  Continue to wear BiPAP at night    Hx nephrogenic diabetes insipidus  Closely monitor renal function and sodium levels  Continue DDAVP at this time  If patient's renal function declines, will obtain nephrology consult    Hx combined CHF with recent exacerbation  Chart review from hospitalization at [facility] indicates patient received 40 mg IV Lasix daily for diuresis  Not likely in exacerbation at this time- BNP is 3000 vs. >14,000 on previous admission  Patient initially started on Lasix 40 mg IV BID- received 1 dose.    Decreased Lasix to 20 mg IV daily  Monitor renal function closely to avoid iatrogenic IRMA 2/2 diuresis  Trend BNP    Elevated troponins in the setting of hx coronary artery disease  Patient reported chest pain this AM  Troponins were delta -ve, and EKG showed no acute changes compared to EKG on admission and EKG from 7/21/23  CXR showed LLL opacity with possible effusion- continue oxygen and abx at this time, follow pulmonary recs  Possibly 2/2 PNA vs. CHF    Hx R IJ Thrombus  Continue Eliquis at this time         DVT/GI ppx: Eliquis/Pepcid        Electronically signed by Kvng Gilbert MD on 7/27/2023 at 1:15 PM  Attending physician: Dr. Senia Cano

## 2023-07-27 NOTE — PROGRESS NOTES
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    Backache     Benign hypertension     CHF (congestive heart failure) ([facility])     Chronic back pain     Chronic kidney disease     stage 3    Chronic pain disorder     Chronic, continuous use of opioids     Compression fracture of thoracic vertebra ([facility])     T10    COPD (chronic obstructive pulmonary disease) ([address])     Debility     Deformity of ankle and foot, acquired 2011    Depression     Diabetes insipidus ([address])     Diverticulitis     Dry eyes     Encephalopathy acute 2017    Gait disturbance     GERD (gastroesophageal reflux disease)     Hemorrhoids 2012    Hernia     History of blood transfusion approx 2017    History of Clostridium difficile     negative culture 12-15-15; resolved    Hyperlipidemia     Hypothyroidism     Ischemic colitis, enteritis, or enterocolitis ([address])     Long term (current) use of systemic steroids 07/10/2022    Long-term current use of steroids     Lumbar disc herniation     Myalgia and myositis, unspecified     Nephrosclerosis     Nonunion of fracture 2011    Osteoarthritis     severe    PAF (paroxysmal atrial fibrillation) ([facility])     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[facility])     PCWP mean:26.0 PA mean: 24.00; denies any recent issues    Pulmonary hypertension ([facility])     ventricular diastolic function consistent with abnormal relaxation (stage I)    Pulmonary sarcoidosis ([facility])

## 2023-07-27 NOTE — CONSULTS
Little Fuchs M.D.,[facility]  Jayjay Patterson D.O., F.A.C.O.I., Larry Hodge M.D. Beata Cedeno M.D. Sohail Padilla D.O. Patient:  Jaki Cole 78 y.o. female MRN: [id number]     Date of Service: 7/27/2023      PULMONARY CONSULTATION    Reason for Consultation: Patient with acute on chronic hypoxic resp failure. Has hx of sarcoidosis and pulmonary HTN. Referring Physician: Dr Onelia Vidal with the referring physician will be sent via the electronic medical record. Chief Complaint: shortness of breath     CODE STATUS: FULL     SUBJECTIVE:  HPI:  Jaki Cole is a 78 y.o.  AA female who we are asked to see for  acute on chronic hypoxic resp failure. Her past medical history includes-  Pulmonary Sarcardoisis (dx 1998), COPD (on 6-8L NC), DM II, Diabetes Insipidus, HTN, Depression, Atrial fibrillation (states stopped taking AC), Hypothyroidism, GERD, hx Ischemic Colitis (s/p colectomy) and CKD who presents from OSH for further management of acute hypoxic respiratory failure and suspected/worsening pulmonary HTN. She underwent repeat right heart catheterization at [facility] on 6/22/2023 which found combined pre and postcapillary pH with borderline preserved cardiac index. Negative nitric oxide response. She was found to have a right IJ thrombus. M PAP decreased from 56 mmHg to 50 mmHg in response to nitric oxide. Cardiac index paradoxically decreased from 2.5 L to 2.0. PVR increased 7.80 POSYE to 9.41 POSEY, while the PAWP decreased from 25 to 18 mmHg. The worsening PVR was due to combination of reduced CO and reduction in PAWP. Recommendations at [facility] was to continue ongoing optimization of heart failure with current diuretic regimen and low-sodium diet. BP control. Considering initiation of PAH therapy once volume status is optimized given significant precapillary component.   Patient with IJ thrombus deemed poor candidate for anticoagulation given severe bleeding history pneumonia. She has advanced pre and post capillary pulmonary hypertension with a PCWP of 28 and a mean PA pressure of 56mmHg. She is relatively euvolemic. She does follow with [facility] and consideration was being given to [facility] meds after she had been diuresed adequately. She will need to follow up outpatient. She is otherwise is at baseline with oxygen and NIV. We did discuss the option for placement as she has not been able to sustain at home. She is adamant about returning home.     Joyce Garcia MD

## 2023-07-27 NOTE — PROGRESS NOTES
[address]      Date:2023                                                  Patient Name: Janis Gates  MRN: [id number]  : 1955  Room: [address] [place] [address]    Evaluating OT: Camryn Botello MOT, OTR/L  # [id number]    Referring Provider:  Annelise Roe DO  Specific Provider Orders:  Hill Cornea and Treat\"  23    Diagnosis: Acute respiratory failure with hypoxia ([address]) [J96.01]      Pt was admitted w/ SOB, productive Cough - recently hospitalized 23 - 23 - discharged Home. Readmitted 23 - 23 - discharged home.   Readmitted 23       Pertinent Medical History:  Pt has a past medical history of A-fib ([address]), Abnormal mammogram, Acute on chronic respiratory failure (HCC), Anemia, Anemia due to chronic illness, Ankle fracture, left, 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[address]), Backache, Benign hypertension, CHF (congestive heart failure) ([facility]), Chronic back pain, Chronic kidney disease, Chronic pain disorder, Chronic, continuous use of opioids, Compression fracture of thoracic vertebra ([facility]), COPD (chronic obstructive pulmonary disease) ([address]), Debility, Deformity of ankle and foot, acquired, Depression, Diabetes insipidus ([address]), Diverticulitis, Dry eyes, Encephalopathy acute, Gait disturbance, GERD (gastroesophageal reflux disease), Hemorrhoids, Hernia, History of blood transfusion, History of Clostridium difficile, Hyperlipidemia, Hypothyroidism, Ischemic colitis, enteritis, or enterocolitis ([address]), Long term (current) use of systemic steroids, Long-term current use of steroids, Lumbar disc herniation, Myalgia and myositis, unspecified, Nephrosclerosis, Nonunion of fracture, Osteoarthritis, PAF (paroxysmal atrial fibrillation) ([address]), 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[address]), Pulmonary hypertension ([address]), Pulmonary sarcoidosis ([address]), Rectal bleeding, Rhabdomyolysis,

## 2023-07-27 NOTE — PROGRESS NOTES
This patient is on medication that requires renal, weight, and/or indication dose adjustment. Date Body Weight IBW  Adjusted BW SCr  CrCl Dialysis status   7/27/2023 [unfilled] Ideal body weight: 57 kg (125 lb 10.6 oz)  Adjusted ideal body weight: 59.8 kg (131 lb 12.8 oz) Serum creatinine: 1.5 mg/dL (H) 07/27/23 0527  Estimated creatinine clearance: 33 mL/min (A) N/a       Pharmacy has dose-adjusted the following medication(s):    Date Previous Order Adjusted Order   7/27/2023 Famotidine 20mg BID Famotidine 20mg daily        These changes were made per protocol according to the [facility]   Automatic Renal Dose Adjustment Policy. *Please note this dose may need readjusted if patient's condition changes. Please contact pharmacy with any questions regarding these changes.     Ashley Brady [facility]  7/27/2023  2:03 PM

## 2023-07-27 NOTE — ED NOTES
Respiratory switched patient to 6L nasal cannula. Provider would like to see how patient does off of BiPAP.       Arya Naylor RN  07/26/23 2001

## 2023-07-27 NOTE — PROGRESS NOTES
Perfect serve message sent to on call MD for [facility]. Spoke with  DO Claus Martin to clarify patients diet status. Entered into computer as NPO no exceptions seen. DO Shantel Stein said she is nothing by mouth but can take a small sip of water with morning medication ordered. Will make day nurse aware.

## 2023-07-27 NOTE — ED NOTES
Nurse to nurse report given, patient marked ready for transport.      Osmani Mackay RN  07/26/23 2001

## 2023-07-27 NOTE — PROGRESS NOTES
Pharmacy Consultation Note  (Antibiotic Dosing and Monitoring)    Initial consult date: 7/26/23  Consulting physician/provider: Charles Vásquez  Drug: Vancomycin  Indication: HAP    Age/  Gender Height Weight IBW  Allergy Information   67 y.o./female 165.1 cm      Ideal body weight: 57 kg (125 lb 10.6 oz)  Adjusted ideal body weight: 59.8 kg (131 lb 12.8 oz)   Patient has no known allergies. Renal Function:  Recent Labs     07/26/23  1449 07/27/23  0527   BUN 25* 28*   CREATININE 1.4* 1.5*       No intake or output data in the 24 hours ending 07/27/23 1357    Vancomycin Monitoring:  Trough:  No results for input(s): VANCOTROUGH in the last 72 hours. Random:  No results for input(s): VANCORANDOM in the last 72 hours. No results for input(s): Elias Taft in the last 72 hours. Historical Cultures:  Organism   Date Value Ref Range Status   07/05/2023 MRSA nasal colonization (A)  Final     No results for input(s): BC in the last 72 hours. Vancomycin Administration Times:  Recent vancomycin administrations                     vancomycin (VANCOCIN) 1,250 mg in sodium chloride 0.9 % 250 mL IVPB (mg) 1,250 mg New Bag 07/26/23 1820                  Assessment:  Patient is a 78 y.o. female who has been initiated on vancomycin  Estimated Creatinine Clearance: 33 mL/min (A) (based on SCr of 1.5 mg/dL (H)). Plan:   Will continue vancomycin 1000 mg IV every 24 hours  Will check vancomycin levels when appropriate  Will continue to monitor renal function   Pharmacy to follow      Garima Hernandez PharmD, BCPS, BCCCP 7/27/2023 1:57 PM

## 2023-07-28 VITALS
BODY MASS INDEX: 23.49 KG/M2 | SYSTOLIC BLOOD PRESSURE: 122 MMHG | TEMPERATURE: 97.1 F | DIASTOLIC BLOOD PRESSURE: 68 MMHG | HEIGHT: 65 IN | RESPIRATION RATE: 20 BRPM | OXYGEN SATURATION: 100 % | HEART RATE: 60 BPM | WEIGHT: 141 LBS

## 2023-07-28 PROBLEM — J96.01 ACUTE RESPIRATORY FAILURE WITH HYPOXIA (HCC): Status: RESOLVED | Noted: 2023-07-26 | Resolved: 2023-07-28

## 2023-07-28 LAB
ANION GAP SERPL CALCULATED.3IONS-SCNC: 9 MMOL/L (ref 7–16)
BASOPHILS # BLD: 0 K/UL (ref 0–0.2)
BASOPHILS NFR BLD: 0 % (ref 0–2)
BNP SERPL-MCNC: 3311 PG/ML (ref 0–125)
BUN SERPL-MCNC: 40 MG/DL (ref 6–23)
CALCIUM SERPL-MCNC: 8.8 MG/DL (ref 8.6–10.2)
CHLORIDE SERPL-SCNC: 100 MMOL/L (ref 98–107)
CO2 SERPL-SCNC: 34 MMOL/L (ref 22–29)
CREAT SERPL-MCNC: 1.7 MG/DL (ref 0.5–1)
EOSINOPHIL # BLD: 0 K/UL (ref 0.05–0.5)
EOSINOPHILS RELATIVE PERCENT: 0 % (ref 0–6)
ERYTHROCYTE [DISTWIDTH] IN BLOOD BY AUTOMATED COUNT: 15.3 % (ref 11.5–15)
ERYTHROCYTE [SEDIMENTATION RATE] IN BLOOD BY WESTERGREN METHOD: 35 MM/HR (ref 0–20)
GFR SERPL CREATININE-BSD FRML MDRD: 32 ML/MIN/1.73M2
GLUCOSE SERPL-MCNC: 79 MG/DL (ref 74–99)
HCT VFR BLD AUTO: 28.5 % (ref 34–48)
HGB BLD-MCNC: 8.7 G/DL (ref 11.5–15.5)
L PNEUMO1 AG UR QL IA.RAPID: NEGATIVE
LYMPHOCYTES NFR BLD: 0.38 K/UL (ref 1.5–4)
LYMPHOCYTES RELATIVE PERCENT: 4 % (ref 20–42)
MAGNESIUM SERPL-MCNC: 2.1 MG/DL (ref 1.6–2.6)
MCH RBC QN AUTO: 26 PG (ref 26–35)
MCHC RBC AUTO-ENTMCNC: 30.5 G/DL (ref 32–34.5)
MCV RBC AUTO: 85.1 FL (ref 80–99.9)
MONOCYTES NFR BLD: 0.46 K/UL (ref 0.1–0.95)
MONOCYTES NFR BLD: 5 % (ref 2–12)
NEUTROPHILS NFR BLD: 90 % (ref 43–80)
NEUTS SEG NFR BLD: 7.96 K/UL (ref 1.8–7.3)
PLATELET # BLD AUTO: 207 K/UL (ref 130–450)
PMV BLD AUTO: 10.5 FL (ref 7–12)
POTASSIUM SERPL-SCNC: 3.8 MMOL/L (ref 3.5–5)
RBC # BLD AUTO: 3.35 M/UL (ref 3.5–5.5)
RBC # BLD: ABNORMAL 10*6/UL
S PNEUM AG SPEC QL: NEGATIVE
SODIUM SERPL-SCNC: 143 MMOL/L (ref 132–146)
SPECIMEN SOURCE: NORMAL
WBC OTHER # BLD: 8.8 K/UL (ref 4.5–11.5)

## 2023-07-28 PROCEDURE — 94640 AIRWAY INHALATION TREATMENT: CPT

## 2023-07-28 PROCEDURE — 6360000002 HC RX W HCPCS

## 2023-07-28 PROCEDURE — 83735 ASSAY OF MAGNESIUM: CPT

## 2023-07-28 PROCEDURE — 6370000000 HC RX 637 (ALT 250 FOR IP): Performed by: INTERNAL MEDICINE

## 2023-07-28 PROCEDURE — 6370000000 HC RX 637 (ALT 250 FOR IP)

## 2023-07-28 PROCEDURE — 85027 COMPLETE CBC AUTOMATED: CPT

## 2023-07-28 PROCEDURE — 83880 ASSAY OF NATRIURETIC PEPTIDE: CPT

## 2023-07-28 PROCEDURE — 36415 COLL VENOUS BLD VENIPUNCTURE: CPT

## 2023-07-28 PROCEDURE — 80048 BASIC METABOLIC PNL TOTAL CA: CPT

## 2023-07-28 PROCEDURE — 99239 HOSP IP/OBS DSCHRG MGMT >30: CPT | Performed by: FAMILY MEDICINE

## 2023-07-28 PROCEDURE — 85652 RBC SED RATE AUTOMATED: CPT

## 2023-07-28 PROCEDURE — 94660 CPAP INITIATION&MGMT: CPT

## 2023-07-28 PROCEDURE — 6360000002 HC RX W HCPCS: Performed by: INTERNAL MEDICINE

## 2023-07-28 PROCEDURE — 2700000000 HC OXYGEN THERAPY PER DAY

## 2023-07-28 PROCEDURE — 2580000003 HC RX 258: Performed by: INTERNAL MEDICINE

## 2023-07-28 RX ORDER — PREDNISONE 5 MG/1
5 TABLET ORAL DAILY
Qty: 30 TABLET | Refills: 0 | Status: SHIPPED | OUTPATIENT
Start: 2023-08-03 | End: 2023-08-01 | Stop reason: ALTCHOICE

## 2023-07-28 RX ORDER — PREDNISONE 10 MG/1
10 TABLET ORAL DAILY
Qty: 2 TABLET | Refills: 0 | Status: SHIPPED | OUTPATIENT
Start: 2023-07-28 | End: 2023-07-28 | Stop reason: SDUPTHER

## 2023-07-28 RX ORDER — PREDNISONE 10 MG/1
10 TABLET ORAL DAILY
Qty: 2 TABLET | Refills: 0 | Status: SHIPPED | OUTPATIENT
Start: 2023-07-31 | End: 2023-08-02

## 2023-07-28 RX ORDER — PREDNISONE 5 MG/1
5 TABLET ORAL DAILY
Qty: 30 TABLET | Refills: 0 | Status: SHIPPED | OUTPATIENT
Start: 2023-08-04 | End: 2023-07-28 | Stop reason: SDUPTHER

## 2023-07-28 RX ORDER — PREDNISONE 20 MG/1
20 TABLET ORAL DAILY
Qty: 2 TABLET | Refills: 0 | Status: SHIPPED | OUTPATIENT
Start: 2023-07-28 | End: 2023-07-30

## 2023-07-28 RX ORDER — PREDNISONE 20 MG/1
20 TABLET ORAL DAILY
Qty: 2 TABLET | Refills: 0 | Status: SHIPPED | OUTPATIENT
Start: 2023-08-01 | End: 2023-07-28 | Stop reason: SDUPTHER

## 2023-07-28 RX ADMIN — BUDESONIDE 250 MCG: 0.25 SUSPENSION RESPIRATORY (INHALATION) at 07:49

## 2023-07-28 RX ADMIN — METOPROLOL SUCCINATE 50 MG: 50 TABLET, EXTENDED RELEASE ORAL at 10:17

## 2023-07-28 RX ADMIN — FERROUS SULFATE TAB 325 MG (65 MG ELEMENTAL FE) 325 MG: 325 (65 FE) TAB at 10:17

## 2023-07-28 RX ADMIN — DESMOPRESSIN ACETATE 200 MCG: 0.1 TABLET ORAL at 10:17

## 2023-07-28 RX ADMIN — ACETAMINOPHEN 650 MG: 325 TABLET ORAL at 12:33

## 2023-07-28 RX ADMIN — LEVETIRACETAM 500 MG: 500 TABLET, FILM COATED ORAL at 10:16

## 2023-07-28 RX ADMIN — AMIODARONE HYDROCHLORIDE 200 MG: 200 TABLET ORAL at 10:16

## 2023-07-28 RX ADMIN — PREDNISONE 40 MG: 20 TABLET ORAL at 12:33

## 2023-07-28 RX ADMIN — LEVOTHYROXINE SODIUM 75 MCG: 0.07 TABLET ORAL at 10:22

## 2023-07-28 RX ADMIN — DIGOXIN 125 MCG: 125 TABLET ORAL at 10:16

## 2023-07-28 RX ADMIN — FAMOTIDINE 20 MG: 20 TABLET, FILM COATED ORAL at 10:16

## 2023-07-28 RX ADMIN — ARFORMOTEROL TARTRATE 15 MCG: 15 SOLUTION RESPIRATORY (INHALATION) at 07:48

## 2023-07-28 RX ADMIN — FUROSEMIDE 20 MG: 10 INJECTION, SOLUTION INTRAMUSCULAR; INTRAVENOUS at 10:17

## 2023-07-28 RX ADMIN — APIXABAN 5 MG: 5 TABLET, FILM COATED ORAL at 10:16

## 2023-07-28 RX ADMIN — IPRATROPIUM BROMIDE AND ALBUTEROL SULFATE 1 DOSE: .5; 2.5 SOLUTION RESPIRATORY (INHALATION) at 07:48

## 2023-07-28 RX ADMIN — DULOXETINE HYDROCHLORIDE 60 MG: 30 CAPSULE, DELAYED RELEASE ORAL at 10:17

## 2023-07-28 RX ADMIN — SODIUM CHLORIDE, PRESERVATIVE FREE 10 ML: 5 INJECTION INTRAVENOUS at 10:16

## 2023-07-28 RX ADMIN — AMLODIPINE BESYLATE 10 MG: 10 TABLET ORAL at 10:17

## 2023-07-28 RX ADMIN — IPRATROPIUM BROMIDE AND ALBUTEROL SULFATE 1 DOSE: .5; 2.5 SOLUTION RESPIRATORY (INHALATION) at 11:05

## 2023-07-28 RX ADMIN — DOCUSATE SODIUM 100 MG: 100 CAPSULE, LIQUID FILLED ORAL at 10:16

## 2023-07-28 ASSESSMENT — PAIN SCALES - GENERAL: PAINLEVEL_OUTOF10: 0

## 2023-07-28 NOTE — PROGRESS NOTES
Pharmacy Consultation Note  (Antibiotic Dosing and Monitoring)    Note vancomycin discontinued. Clinical pharmacy will sign-off. Please re-consult if we can be of further assistance.       Miriam Gu PharmD, BCPS, BCCCP 7/28/2023 10:17 AM

## 2023-07-28 NOTE — PROGRESS NOTES
Date: 7/28/2023    Time: 12:19 AM    Patient Placed On BIPAP/CPAP/ Non-Invasive Ventilation? No    If no must comment. Facial area red/color change? No           If YES are Blister/Lesion present? No   If yes must notify nursing staff  BIPAP/CPAP skin barrier? Yes    Skin barrier type:mepilexlite       Comments: remains on from previous shift.       Meredith Eason RCP

## 2023-07-28 NOTE — PROGRESS NOTES
07/27/23 2130   NIV Type   NIV Started/Stopped On   Equipment Type v60   Mode AVAPS   Mask Type Full face mask   Mask Size Medium   Assessment   SpO2 99 %   Level of Consciousness 0   Comfort Level Good   Using Accessory Muscles No   Mask Compliance Good   Skin Assessment Clean, dry, & intact   Skin Protection for O2 Device Yes   Orientation Middle   Location Nose   Settings/Measurements   CPAP/EPAP 10 cmH2O   IPAP Min 20 cmH2O   IPAP Max 30 cmH2O   Vt (Set, mL) 450 mL   Vt (Measured) 353 mL   Rate Ordered 20   FiO2  50 %   Minute Volume (L/min) 7.1 Liters   Mask Leak (lpm) 51 lpm   Patient's Home Machine No   Alarm Settings   Alarms On Y   Low Pressure (cmH2O) 8 cmH2O   High Pressure (cmH2O) 30 cmH2O   RR Low (bpm) 16   RR High (bpm) 35 br/min     Date: 7/27/2023    Time: 9:32 PM    Patient Placed On BIPAP/CPAP/ Non-Invasive Ventilation? Yes    If no must comment. Facial area red/color change? No           If YES are Blister/Lesion present? No   If yes must notify nursing staff  BIPAP/CPAP skin barrier?   Yes    Skin barrier type:mepilexlite       Comments:        Thee Mohamud RCP

## 2023-07-28 NOTE — PLAN OF CARE
Problem: Discharge Planning  Goal: Discharge to home or other facility with appropriate resources  Outcome: Completed     Problem: Skin/Tissue Integrity  Goal: Absence of new skin breakdown  Description: 1. Monitor for areas of redness and/or skin breakdown  2. Assess vascular access sites hourly  3. Every 4-6 hours minimum:  Change oxygen saturation probe site  4. Every 4-6 hours:  If on nasal continuous positive airway pressure, respiratory therapy assess nares and determine need for appliance change or resting period.   Outcome: Completed     Problem: Safety - Adult  Goal: Free from fall injury  Outcome: Completed     Problem: Chronic Conditions and Co-morbidities  Goal: Patient's chronic conditions and co-morbidity symptoms are monitored and maintained or improved  Outcome: Completed

## 2023-07-28 NOTE — PROGRESS NOTES
4 Eyes Skin Assessment     NAME:  Rob Batista  YOB: 1955  MEDICAL RECORD NUMBER:  [id number]    The patient is being assessed for  Admission    I agree that at least one RN has performed a thorough Head to Toe Skin Assessment on the patient. ALL assessment sites listed below have been assessed. Areas assessed by both nurses:    Head, Face, Ears, Shoulders, Back, Chest, Arms, Elbows, Hands, Sacrum. Buttock, Coccyx, Ischium, Legs. Feet and Heels, Under Medical Devices , and Other ***   Pink area to abdomen r/t incision from hernia repair     Does the Patient have a Wound?  No        Blas Prevention initiated by RN: Yes  Wound Care Orders initiated by RN: No    Pressure Injury (Stage 3,4, Unstageable, DTI, NWPT, and Complex wounds) if present, place Wound referral order by RN under : No    New Ostomies, if present place, Ostomy referral order under : No     Nurse 1 eSignature: Electronically signed by Dustin Teran RN on 7/27/23 at 10:07 PM EDT    **SHARE this note so that the co-signing nurse can place an eSignature**    Nurse 2 eSignature: {Esignature:[id number]}

## 2023-07-28 NOTE — DISCHARGE INSTRUCTIONS
=====================================  [facility], [address]  =====================================    Take your medications as directed in this summary    Future scheduled appointments are listed below or recommended appointments are mentioned above. Future Appointments   Date Time Provider [address]  [address]   8/1/2023 12:40 PM Floyd Lopez MD 14[address] [facility]   8/4/2023  3:00 PM [facility]   8/16/2023 12:30 PM [facility] ROOM 1 [facility]   9/19/2023  1:45 PM Shashi Santos MD [facility]       Call [phone] to confirm or schedule your appointment with Dr. Ventura Yin,  as soon as possible and/or if there are any appointment changes or other issues. It is important that you follow up with Dr. Ventura Yin for better monitoring of the reason of your hospitalization. Follow up with the specialists that saw you during your stay as well. If you have any questions call your [phone]. Once discharged from [facility], you can :     Return to work : Yes, you may return to work  Activity : As tolerated  Stairs : As tolerated  Exercise : As tolerated  Lifting : As tolerated   Sexual activity : Yes  Driving : With seat belt on. NO driving on narcotic pain medication if prescribed   Medications : Always take your medications as prescribed  Wound Care: none needed  Diet : You are asked to make an attempt to improve diet and exercise patterns to aid in medical management of your medical condition/problem. Call [facility] with any further questions. Return to Emergency Department with any worsening of your condition and/or fever greater than 101 degrees, new weakness, shortness of breath or chest pain.

## 2023-07-28 NOTE — CARE COORDINATION
CM note. D/c order noted. Notified Keena with [facility] of d/c today. PA orders in place. Spoke with pt. She is requesting an ambulance home. She asked me to call her . Spoke with pt . He agrees with an ambulance transport home. Pt set up via stretcher with PAS.  time is 1230. Updated pt, pt , and RN of  time. Johnna Guerrero [phone].
following:, Cooking, Housework, Shopping, Mobility, Toileting, Bathing  Current functional level: Assistance with the following:, Cooking, Housework, Shopping, Mobility, Toileting, Bathing    PT AM-PAC: 13 /24  OT AM-PAC: 15 /24    Family can provide assistance at DC: Yes  Would you like Case Management to discuss the discharge plan with any other family members/significant others, and if so, who? No  Plans to Return to Present Housing: Yes  Other Identified Issues/Barriers to RETURNING to current housing:   Potential Assistance needed at discharge: Home Care            Potential DME:    Patient expects to discharge to: Unknown  Plan for transportation at discharge:      Financial    Payor: [id number] / Plan: Helen Meeter / Product Type: *No Product type* /     Does insurance require precert for SNF: Yes    Potential assistance Purchasing Medications:    Meds-to-Beds request: No      [facility] - [address]  [facility] [address]  Phone: [phone] Fax: [phone]      Notes:    Factors facilitating achievement of predicted outcomes: Family support, Cooperative, and Pleasant    Barriers to discharge: Medical complications    Additional Case Management Notes: The Plan for Transition of Care is related to the following treatment goals of Acute respiratory failure with hypoxia ([address]) [N96.13]    IF APPLICABLE: The Patient and/or patient representative Nicole Camacho and her family were provided with a choice of provider and agrees with the discharge plan. Freedom of choice list with basic dialogue that supports the patient's individualized plan of care/goals and shares the quality data associated with the providers was provided to: Patient   Patient Representative Name:       The Patient and/or Patient Representative Agree with the Discharge Plan?  Yes    Shama Walker RN  Case Management Department  Ph: [phone]

## 2023-07-28 NOTE — DISCHARGE SUMMARY
Discharge Summary    Janis Gates  :  1955  MRN:  [id number]    Admit date:  2023  Discharge date:  2023    Admitting Physician:  Marcial Hernandes MD    Discharge Diagnoses:    Patient Active Problem List   Diagnosis    Chronic back pain    Hypothyroidism    Nephrosclerosis    Diabetes insipidus ([address])    Pulmonary sarcoidosis ([address])    Steroid-induced avascular necrosis of shoulder ([address])    Anemia due to chronic illness    Chronic pain syndrome    Bilateral hip pain    Debility    BRBPR (bright red blood per rectum)    Severe pulmonary hypertension (HCC)    Chronic kidney disease, stage III (moderate) (HCC)    PAF (paroxysmal atrial fibrillation) ([address]) currently in NSR    Mixed hyperlipidemia    Goals of care, counseling/discussion    Atrial fibrillation with RVR (HCC)    Chronic combined systolic and diastolic heart failure (HCC)    Chronic hypoxemic respiratory failure (HCC)    Mixed simple and mucopurulent chronic bronchitis (HCC)    Recurrent major depressive disorder, in partial remission (HCC)    Gait disturbance    Chest pain    Chronic, continuous use of opioids    PMB (postmenopausal bleeding)    Severe dysplasia of vagina    Epistaxis    Ventral hernia without obstruction or gangrene    Long term (current) use of systemic steroids    Nuclear senile cataract    Constipation    Abdominal pain    Small bowel obstruction (HCC)    Seizure (HCC)    Elevated troponin level    RVF (right ventricular failure) (HCC)    Moderate protein-calorie malnutrition (HCC)    Hypokalemia    Primary hypertension    Acute combined systolic and diastolic CHF, NYHA class 1 ([address])    Acute on chronic diastolic CHF (congestive heart failure) ([address])       Admission Condition:  poor    Discharged Condition:  good    Hospital Course:  Janis Gates is a 78 y.o. female with PMH of pulmonary sarcoidosis, hypertension, atrial fibrillation, chronic right IJ thrombus, CKD IIIa, hypothyroidism, combined concerning   for pneumonia and or atelectasis. 2. Coarse interstitial markings within the lungs suggestive underlying   pulmonary fibrotic disease, stable   3. Dilation of the pulmonary arteries concerning for pulmonary artery   hypertension. If of clinical concern, a or cardia agger fee may be of   increased sensitivity. 4. Hyperinflation of the lungs suggestive of underlying signs of chronic   obstructive pulmonary disease         XR CHEST PORTABLE   Final Result   Cardiomegaly. Pulmonary arterial hypertension. Mildly increasing   atelectasis and or infiltrate at the left lower lobe. Treatments:   As above    Discharge Exam:  Please see physical exam from progress note from day of discharge    Disposition:   home with home health care    Future Appointments   Date Time Provider [address]   8/1/2023 12:40 PM Saleem Stewart MD 1495 [facility]   8/4/2023  3:00 PM Meliton Hayes MD [facility]   8/16/2023 12:30 PM [facility] ROOM 1 [facility]   9/19/2023  1:45 PM Germán Mora MD [facility]       More than 30 minutes was spent in preparation of this patient's discharge including, but not limited to, examination, preparation of documents, prescription preparation, counseling and coordination.     Signed:  Meliton Hayes MD  7/28/2023, 5:15 PM

## 2023-07-29 ENCOUNTER — TELEPHONE (OUTPATIENT)
Dept: FAMILY MEDICINE CLINIC | Age: 68
End: 2023-07-29

## 2023-07-29 NOTE — TELEPHONE ENCOUNTER
Spoke to patient and her  regarding her sputum culture results. Instructed them to  antibiotic from pharmacy. Discussed side effects and interactions with her other medications, specifically amiodarone. Patient and  expressed understanding. All questions answered.     Electronically signed by Charli Callahan MD on 7/29/2023 at 4:33 PM

## 2023-07-30 LAB
MICROORGANISM SPEC CULT: ABNORMAL
MICROORGANISM SPEC CULT: ABNORMAL
MICROORGANISM SPEC CULT: NORMAL
MICROORGANISM SPEC CULT: NORMAL
MICROORGANISM/AGENT SPEC: ABNORMAL
SERVICE CMNT-IMP: NORMAL
SERVICE CMNT-IMP: NORMAL
SPECIMEN DESCRIPTION: ABNORMAL
SPECIMEN DESCRIPTION: NORMAL
SPECIMEN DESCRIPTION: NORMAL

## 2023-07-30 NOTE — RESULT ENCOUNTER NOTE
D/w Dr Derrick Gonzalez, consider colonization vs true infection. For now levaquin 750 qod x4 doses(8 days). Monitor for improvement. Dr Bird Bonilla spoke w/ patient see her note.  Williams

## 2023-07-31 LAB
MICROORGANISM SPEC CULT: NORMAL
MICROORGANISM SPEC CULT: NORMAL
SERVICE CMNT-IMP: NORMAL
SERVICE CMNT-IMP: NORMAL
SPECIMEN DESCRIPTION: NORMAL
SPECIMEN DESCRIPTION: NORMAL

## 2023-07-31 RX ORDER — LEVOFLOXACIN 750 MG/1
750 TABLET ORAL EVERY OTHER DAY
Qty: 4 TABLET | Refills: 0 | Status: SHIPPED | OUTPATIENT
Start: 2023-07-31 | End: 2023-08-08

## 2023-08-01 ENCOUNTER — OFFICE VISIT (OUTPATIENT)
Dept: CARDIOLOGY CLINIC | Age: 68
End: 2023-08-01
Payer: MEDICARE

## 2023-08-01 ENCOUNTER — TELEPHONE (OUTPATIENT)
Dept: FAMILY MEDICINE CLINIC | Age: 68
End: 2023-08-01

## 2023-08-01 VITALS
HEIGHT: 62 IN | WEIGHT: 141 LBS | BODY MASS INDEX: 25.95 KG/M2 | SYSTOLIC BLOOD PRESSURE: 108 MMHG | RESPIRATION RATE: 12 BRPM | HEART RATE: 58 BPM | DIASTOLIC BLOOD PRESSURE: 70 MMHG

## 2023-08-01 DIAGNOSIS — I27.20 SEVERE PULMONARY HYPERTENSION (HCC): Primary | ICD-10-CM

## 2023-08-01 PROCEDURE — G8427 DOCREV CUR MEDS BY ELIG CLIN: HCPCS | Performed by: INTERNAL MEDICINE

## 2023-08-01 PROCEDURE — 1036F TOBACCO NON-USER: CPT | Performed by: INTERNAL MEDICINE

## 2023-08-01 PROCEDURE — 93000 ELECTROCARDIOGRAM COMPLETE: CPT | Performed by: INTERNAL MEDICINE

## 2023-08-01 PROCEDURE — G8399 PT W/DXA RESULTS DOCUMENT: HCPCS | Performed by: INTERNAL MEDICINE

## 2023-08-01 PROCEDURE — 1123F ACP DISCUSS/DSCN MKR DOCD: CPT | Performed by: INTERNAL MEDICINE

## 2023-08-01 PROCEDURE — G8417 CALC BMI ABV UP PARAM F/U: HCPCS | Performed by: INTERNAL MEDICINE

## 2023-08-01 PROCEDURE — 3078F DIAST BP <80 MM HG: CPT | Performed by: INTERNAL MEDICINE

## 2023-08-01 PROCEDURE — 1111F DSCHRG MED/CURRENT MED MERGE: CPT | Performed by: INTERNAL MEDICINE

## 2023-08-01 PROCEDURE — 3017F COLORECTAL CA SCREEN DOC REV: CPT | Performed by: INTERNAL MEDICINE

## 2023-08-01 PROCEDURE — 3074F SYST BP LT 130 MM HG: CPT | Performed by: INTERNAL MEDICINE

## 2023-08-01 PROCEDURE — 99214 OFFICE O/P EST MOD 30 MIN: CPT | Performed by: INTERNAL MEDICINE

## 2023-08-01 PROCEDURE — 1090F PRES/ABSN URINE INCON ASSESS: CPT | Performed by: INTERNAL MEDICINE

## 2023-08-01 RX ORDER — OXYCODONE HYDROCHLORIDE 5 MG/1
5 TABLET ORAL EVERY 4 HOURS PRN
COMMUNITY

## 2023-08-01 NOTE — PROGRESS NOTES
hypertension  2d e cho 6/16/20 EF 61   Study completed for sarcoidosis and pulmonary hypertension. There is evidence for pulmonary hypertension. There is no evidence for cardiac sarcoidosis on this study. Concentric remodeling. Normal left ventricular systolic function. No wall motion abnormalities or thinning in a non coronary distribution   that would be consistent with cardiac sarcoidosis. Normal diastolic function. Normal left atrial pressure. Mild right ventricular systolic dysfunction. TAPSE is 1.2 cm, TDI s' is 9 cm/s, and the RV free wall is mildly   dysfunctional.   RVSP is 69 mm Hg. Mildly redundant mitral valve leaflets. Mild mitral regurgitation. Mildly redundant tricuspid valve leaflets. Mild to moderate tricuspid valve. Central regurgitant jet. Small inferior pericardial effusion. No tamponade physiology. Echocardiogram - 7/14/22:   Ejection fraction is visually estimated at 60%. No regional wall motion abnormalities seen. Moderate left ventricular concentric hypertrophy noted. Dilated right ventricle with reduced function. Left atrial volume index of 34 ml per meters squared BSA. Moderate mitral regurgitation is present. Moderate tricuspid regurgitation. Pulmonary hypertension is severe. RVSP is 70 mmHg. No evidence for hemodynamically significant pericardial effusion. TTE-5/18/2023  Normal left ventricle size and systolic function. Ejection fraction is visually estimated at 65-70%. Atrial fibrillation my affect the evaluation of LV systolic function. No regional wall motion abnormalities seen. There is concentric remodelling. Stage I diastolic dysfunction. Normal right ventricular size. Right ventricle global systolic function is low normal . TAPSE 17 mm. No hemodynamically significant aortic stenosis is present. Moderate tricuspid regurgitation. RVSP is 119 mmHg. Pulmonary hypertension is severe .   No evidence for hemodynamically significant

## 2023-08-01 NOTE — TELEPHONE ENCOUNTER
----- Message from Carl Heredia sent at 8/1/2023 11:05 AM EDT -----  Subject: Message to Provider    QUESTIONS  Information for Provider? Pt would like to see if her one month med follow   up can be done on 8/4 with hos f/u since no appt meets pt needs. Sadaf Tracey spouse would like contacted. Thank you  ---------------------------------------------------------------------------  --------------  Sergio Grijalva INFO  [phone]; OK to leave message on voicemail  ---------------------------------------------------------------------------  --------------  SCRIPT ANSWERS  Relationship to Patient?  Self

## 2023-08-01 NOTE — PATIENT INSTRUCTIONS
Continue amiodarone 200 mg po daily, advised to stop digoxin due to drug to drug interaction. Advised to stop amlodipine due to soft BP. Monitor BP and call me in AM  Continue Toprol 50 mg po twice a day and Eliquis 5 mg Po twice a day for anticoagulation  She was advised to keep well-hydrated and recommended to drink at least 2 glasses of water after going home. Monitor blood pressure and call me with the blood pressure readings in a.m. Patient was advised to consider watchman device for stroke prevention which she is going to think about it. Continue Lasix 20  mg daily  Follow with renal service for CKD  Continue rest of the current medications  Follow-up with me in 3 months.

## 2023-08-03 LAB
EKG ATRIAL RATE: 81 BPM
EKG ATRIAL RATE: 82 BPM
EKG P AXIS: 21 DEGREES
EKG P AXIS: 54 DEGREES
EKG P-R INTERVAL: 140 MS
EKG P-R INTERVAL: 142 MS
EKG Q-T INTERVAL: 324 MS
EKG Q-T INTERVAL: 346 MS
EKG QRS DURATION: 76 MS
EKG QRS DURATION: 84 MS
EKG QTC CALCULATION (BAZETT): 378 MS
EKG QTC CALCULATION (BAZETT): 401 MS
EKG R AXIS: 75 DEGREES
EKG R AXIS: 80 DEGREES
EKG T AXIS: -170 DEGREES
EKG T AXIS: 141 DEGREES
EKG VENTRICULAR RATE: 81 BPM
EKG VENTRICULAR RATE: 82 BPM

## 2023-08-04 ENCOUNTER — OFFICE VISIT (OUTPATIENT)
Dept: FAMILY MEDICINE CLINIC | Age: 68
End: 2023-08-04
Payer: MEDICARE

## 2023-08-04 VITALS
OXYGEN SATURATION: 97 % | DIASTOLIC BLOOD PRESSURE: 77 MMHG | WEIGHT: 183 LBS | HEART RATE: 59 BPM | BODY MASS INDEX: 33.47 KG/M2 | SYSTOLIC BLOOD PRESSURE: 113 MMHG | TEMPERATURE: 97.4 F

## 2023-08-04 DIAGNOSIS — R05.3 CHRONIC COUGH: ICD-10-CM

## 2023-08-04 DIAGNOSIS — J96.11 CHRONIC RESPIRATORY FAILURE WITH HYPOXIA (HCC): Primary | Chronic | ICD-10-CM

## 2023-08-04 DIAGNOSIS — I50.33 ACUTE ON CHRONIC DIASTOLIC CHF (CONGESTIVE HEART FAILURE) (HCC): ICD-10-CM

## 2023-08-04 PROCEDURE — 3078F DIAST BP <80 MM HG: CPT

## 2023-08-04 PROCEDURE — 1123F ACP DISCUSS/DSCN MKR DOCD: CPT

## 2023-08-04 PROCEDURE — 3017F COLORECTAL CA SCREEN DOC REV: CPT

## 2023-08-04 PROCEDURE — G8417 CALC BMI ABV UP PARAM F/U: HCPCS

## 2023-08-04 PROCEDURE — 1111F DSCHRG MED/CURRENT MED MERGE: CPT

## 2023-08-04 PROCEDURE — 1090F PRES/ABSN URINE INCON ASSESS: CPT

## 2023-08-04 PROCEDURE — 1036F TOBACCO NON-USER: CPT

## 2023-08-04 PROCEDURE — 3074F SYST BP LT 130 MM HG: CPT

## 2023-08-04 PROCEDURE — 99213 OFFICE O/P EST LOW 20 MIN: CPT

## 2023-08-04 PROCEDURE — G8428 CUR MEDS NOT DOCUMENT: HCPCS

## 2023-08-04 PROCEDURE — G8399 PT W/DXA RESULTS DOCUMENT: HCPCS

## 2023-08-04 RX ORDER — FUROSEMIDE 20 MG/1
40 TABLET ORAL DAILY
Qty: 60 TABLET | Refills: 0 | Status: ON HOLD | OUTPATIENT
Start: 2023-08-04 | End: 2023-09-03

## 2023-08-04 RX ORDER — GUAIFENESIN 400 MG/1
400 TABLET ORAL 4 TIMES DAILY PRN
Qty: 56 TABLET | Refills: 0 | Status: ON HOLD | OUTPATIENT
Start: 2023-08-04

## 2023-08-04 RX ORDER — GUAIFENESIN/DEXTROMETHORPHAN 100-10MG/5
5 SYRUP ORAL 3 TIMES DAILY PRN
Qty: 120 ML | Refills: 0 | Status: ON HOLD | OUTPATIENT
Start: 2023-08-04 | End: 2023-08-14

## 2023-08-04 RX ORDER — LEVOFLOXACIN 750 MG/1
750 TABLET ORAL EVERY OTHER DAY
Qty: 4 TABLET | Refills: 0 | Status: CANCELLED | OUTPATIENT
Start: 2023-08-04 | End: 2023-08-12

## 2023-08-04 NOTE — PROGRESS NOTES
Post-Discharge Transitional Care  Follow Up      Prabhjot Farrar   YOB: 1955    Date of Office Visit:  8/4/2023  Date of Hospital Admission: 7/26/23  Date of Hospital Discharge: 7/28/23  Risk of hospital readmission (high >=14%. Medium >=10%) :Readmission Risk Score: 43.1      Care management risk score Rising risk (score 2-5) and Complex Care (Scores >=6): No Risk Score On File     Non face to face  following discharge, date last encounter closed (first attempt may have been earlier): *No documented post hospital discharge outreach found in the last 14 days    Call initiated 2 business days of discharge: *No response recorded in the last 14 days    ASSESSMENT/PLAN:   Chronic respiratory failure with hypoxia (HCC)  Differential for persistent SOB includes persistent pneumonia vs. Congestive heart failure vs. Patient's new baseline d/t pulmonary HTN/sarcoidosis  Patient scheduled for XR on 8/7/23, will evaluate at that time for further management  Patient advised to increase dose of furosemide to 40 mg daily from 20 mg daily at this time, as CHF may be contributing to symptoms  Referral sent for ID f/u  -     guaiFENesin 400 MG tablet; Take 1 tablet by mouth 4 times daily as needed for Cough, Disp-56 tablet, R-0Normal  -     (Epic) - Kodi Ibarra MD, Infectious Disease, [place] ([facility])  Chronic cough  Patient requested refill of her cough syrup with codeine to control sxs- patient strongly advised against taking codeine as it is metabolized to morphine and she is already on oxycodone daily  Will send Robitussin to control sxs at this time  -     guaiFENesin-dextromethorphan (ROBITUSSIN DM) 100-10 MG/5ML syrup; Take 5 mLs by mouth 3 times daily as needed for Cough, Disp-120 mL, R-0Normal  Acute on chronic diastolic CHF (congestive heart failure) (HCC)  Increase Lasix as above  -     furosemide (LASIX) 20 MG tablet;  Take 2 tablets by mouth daily, Disp-60 tablet, R-0Normal      Medical Decision

## 2023-08-09 ENCOUNTER — HOSPITAL ENCOUNTER (INPATIENT)
Age: 68
LOS: 8 days | Discharge: HOME | End: 2023-08-18
Attending: EMERGENCY MEDICINE
Payer: MEDICARE

## 2023-08-09 ENCOUNTER — TELEPHONE (OUTPATIENT)
Dept: OTHER | Facility: CLINIC | Age: 68
End: 2023-08-09

## 2023-08-09 ENCOUNTER — APPOINTMENT (OUTPATIENT)
Dept: GENERAL RADIOLOGY | Age: 68
End: 2023-08-09
Payer: MEDICARE

## 2023-08-09 DIAGNOSIS — R05.3 CHRONIC COUGH: ICD-10-CM

## 2023-08-09 DIAGNOSIS — M54.50 CHRONIC BILATERAL LOW BACK PAIN WITHOUT SCIATICA: ICD-10-CM

## 2023-08-09 DIAGNOSIS — J96.11 CHRONIC RESPIRATORY FAILURE WITH HYPOXIA (HCC): Primary | ICD-10-CM

## 2023-08-09 DIAGNOSIS — M25.552 BILATERAL HIP PAIN: ICD-10-CM

## 2023-08-09 DIAGNOSIS — G89.29 CHRONIC BILATERAL LOW BACK PAIN WITHOUT SCIATICA: ICD-10-CM

## 2023-08-09 DIAGNOSIS — G89.4 CHRONIC PAIN SYNDROME: ICD-10-CM

## 2023-08-09 DIAGNOSIS — R32 URINARY INCONTINENCE, UNSPECIFIED TYPE: ICD-10-CM

## 2023-08-09 DIAGNOSIS — M25.551 BILATERAL HIP PAIN: ICD-10-CM

## 2023-08-09 DIAGNOSIS — J18.9 PNEUMONIA OF BOTH LUNGS DUE TO INFECTIOUS ORGANISM, UNSPECIFIED PART OF LUNG: ICD-10-CM

## 2023-08-09 LAB
ANION GAP SERPL CALCULATED.3IONS-SCNC: 10 MMOL/L (ref 7–16)
BASOPHILS # BLD: 0.02 K/UL (ref 0–0.2)
BASOPHILS NFR BLD: 0 % (ref 0–2)
BUN SERPL-MCNC: 24 MG/DL (ref 6–23)
CALCIUM SERPL-MCNC: 9.8 MG/DL (ref 8.6–10.2)
CHLORIDE SERPL-SCNC: 99 MMOL/L (ref 98–107)
CO2 SERPL-SCNC: 35 MMOL/L (ref 22–29)
CREAT SERPL-MCNC: 1.2 MG/DL (ref 0.5–1)
EOSINOPHIL # BLD: 0.07 K/UL (ref 0.05–0.5)
EOSINOPHILS RELATIVE PERCENT: 1 % (ref 0–6)
ERYTHROCYTE [DISTWIDTH] IN BLOOD BY AUTOMATED COUNT: 16.2 % (ref 11.5–15)
GFR SERPL CREATININE-BSD FRML MDRD: 52 ML/MIN/1.73M2
GLUCOSE SERPL-MCNC: 98 MG/DL (ref 74–99)
HCT VFR BLD AUTO: 34.3 % (ref 34–48)
HGB BLD-MCNC: 10.2 G/DL (ref 11.5–15.5)
IMM GRANULOCYTES # BLD AUTO: 0.05 K/UL (ref 0–0.58)
IMM GRANULOCYTES NFR BLD: 1 % (ref 0–5)
LYMPHOCYTES NFR BLD: 0.62 K/UL (ref 1.5–4)
LYMPHOCYTES RELATIVE PERCENT: 6 % (ref 20–42)
MAGNESIUM SERPL-MCNC: 1.9 MG/DL (ref 1.6–2.6)
MCH RBC QN AUTO: 25.5 PG (ref 26–35)
MCHC RBC AUTO-ENTMCNC: 29.7 G/DL (ref 32–34.5)
MCV RBC AUTO: 85.8 FL (ref 80–99.9)
MONOCYTES NFR BLD: 0.82 K/UL (ref 0.1–0.95)
MONOCYTES NFR BLD: 7 % (ref 2–12)
NEUTROPHILS NFR BLD: 86 % (ref 43–80)
NEUTS SEG NFR BLD: 9.49 K/UL (ref 1.8–7.3)
PLATELET # BLD AUTO: 153 K/UL (ref 130–450)
PMV BLD AUTO: 12.4 FL (ref 7–12)
POTASSIUM SERPL-SCNC: 3.8 MMOL/L (ref 3.5–5)
RBC # BLD AUTO: 4 M/UL (ref 3.5–5.5)
SODIUM SERPL-SCNC: 144 MMOL/L (ref 132–146)
WBC OTHER # BLD: 11.1 K/UL (ref 4.5–11.5)

## 2023-08-09 PROCEDURE — 83880 ASSAY OF NATRIURETIC PEPTIDE: CPT

## 2023-08-09 PROCEDURE — 80048 BASIC METABOLIC PNL TOTAL CA: CPT

## 2023-08-09 PROCEDURE — 6370000000 HC RX 637 (ALT 250 FOR IP): Performed by: EMERGENCY MEDICINE

## 2023-08-09 PROCEDURE — 99285 EMERGENCY DEPT VISIT HI MDM: CPT

## 2023-08-09 PROCEDURE — 83735 ASSAY OF MAGNESIUM: CPT

## 2023-08-09 PROCEDURE — 71045 X-RAY EXAM CHEST 1 VIEW: CPT

## 2023-08-09 PROCEDURE — 93005 ELECTROCARDIOGRAM TRACING: CPT | Performed by: EMERGENCY MEDICINE

## 2023-08-09 PROCEDURE — 84145 PROCALCITONIN (PCT): CPT

## 2023-08-09 PROCEDURE — 85025 COMPLETE CBC W/AUTO DIFF WBC: CPT

## 2023-08-09 RX ORDER — IPRATROPIUM BROMIDE AND ALBUTEROL SULFATE 2.5; .5 MG/3ML; MG/3ML
1 SOLUTION RESPIRATORY (INHALATION) ONCE
Status: COMPLETED | OUTPATIENT
Start: 2023-08-10 | End: 2023-08-10

## 2023-08-09 RX ORDER — OXYCODONE HYDROCHLORIDE AND ACETAMINOPHEN 5; 325 MG/1; MG/1
1 TABLET ORAL ONCE
Status: COMPLETED | OUTPATIENT
Start: 2023-08-09 | End: 2023-08-09

## 2023-08-09 RX ADMIN — OXYCODONE AND ACETAMINOPHEN 1 TABLET: 5; 325 TABLET ORAL at 22:39

## 2023-08-09 ASSESSMENT — PAIN - FUNCTIONAL ASSESSMENT: PAIN_FUNCTIONAL_ASSESSMENT: 0-10

## 2023-08-09 ASSESSMENT — PAIN DESCRIPTION - DESCRIPTORS: DESCRIPTORS: ACHING

## 2023-08-09 ASSESSMENT — PAIN DESCRIPTION - LOCATION: LOCATION: CHEST;HEAD

## 2023-08-09 ASSESSMENT — PAIN SCALES - GENERAL: PAINLEVEL_OUTOF10: 5

## 2023-08-10 PROBLEM — J96.20 ACUTE ON CHRONIC RESPIRATORY FAILURE (HCC): Status: ACTIVE | Noted: 2023-08-10

## 2023-08-10 LAB
ALBUMIN SERPL-MCNC: 3.7 G/DL (ref 3.5–5.2)
ALP SERPL-CCNC: 74 U/L (ref 35–104)
ALT SERPL-CCNC: 11 U/L (ref 0–32)
ANION GAP SERPL CALCULATED.3IONS-SCNC: 12 MMOL/L (ref 7–16)
AST SERPL-CCNC: 20 U/L (ref 0–31)
ATYPICAL LYMPHOCYTE ABSOLUTE COUNT: 0.1 K/UL (ref 0–0.46)
ATYPICAL LYMPHOCYTES: 1 % (ref 0–4)
B.E.: 4.8 MMOL/L (ref -3–3)
BASOPHILS # BLD: 0 K/UL (ref 0–0.2)
BASOPHILS NFR BLD: 0 % (ref 0–2)
BILIRUB SERPL-MCNC: 0.4 MG/DL (ref 0–1.2)
BNP SERPL-MCNC: 1494 PG/ML (ref 0–125)
BNP SERPL-MCNC: 1564 PG/ML (ref 0–125)
BUN SERPL-MCNC: 21 MG/DL (ref 6–23)
CALCIUM SERPL-MCNC: 9.4 MG/DL (ref 8.6–10.2)
CHLORIDE SERPL-SCNC: 100 MMOL/L (ref 98–107)
CO2 SERPL-SCNC: 31 MMOL/L (ref 22–29)
COHB: 0.7 % (ref 0–1.5)
CREAT SERPL-MCNC: 1 MG/DL (ref 0.5–1)
CRITICAL: ABNORMAL
DATE ANALYZED: ABNORMAL
DATE OF COLLECTION: ABNORMAL
EOSINOPHIL # BLD: 0.3 K/UL (ref 0.05–0.5)
EOSINOPHILS RELATIVE PERCENT: 3 % (ref 0–6)
ERYTHROCYTE [DISTWIDTH] IN BLOOD BY AUTOMATED COUNT: 16.3 % (ref 11.5–15)
GFR SERPL CREATININE-BSD FRML MDRD: >60 ML/MIN/1.73M2
GLUCOSE SERPL-MCNC: 96 MG/DL (ref 74–99)
HCO3: 30 MMOL/L (ref 22–26)
HCT VFR BLD AUTO: 32.1 % (ref 34–48)
HGB BLD-MCNC: 9.7 G/DL (ref 11.5–15.5)
HHB: 12.1 % (ref 0–5)
LAB: ABNORMAL
LYMPHOCYTES NFR BLD: 0.2 K/UL (ref 1.5–4)
LYMPHOCYTES RELATIVE PERCENT: 2 % (ref 20–42)
Lab: ABNORMAL
MAGNESIUM SERPL-MCNC: 1.8 MG/DL (ref 1.6–2.6)
MCH RBC QN AUTO: 25.5 PG (ref 26–35)
MCHC RBC AUTO-ENTMCNC: 30.2 G/DL (ref 32–34.5)
MCV RBC AUTO: 84.3 FL (ref 80–99.9)
METHB: 0.4 % (ref 0–1.5)
MODE: ABNORMAL
MONOCYTES NFR BLD: 0.3 K/UL (ref 0.1–0.95)
MONOCYTES NFR BLD: 3 % (ref 2–12)
NEUTROPHILS NFR BLD: 92 % (ref 43–80)
NEUTS SEG NFR BLD: 10.6 K/UL (ref 1.8–7.3)
O2 CONTENT: 13.7 ML/DL
O2 SATURATION: 87.8 % (ref 92–98.5)
O2HB: 86.8 % (ref 94–97)
OPERATOR ID: 2245
PATIENT TEMP: 37 C
PCO2: 46.7 MMHG (ref 35–45)
PH BLOOD GAS: 7.42 (ref 7.35–7.45)
PLATELET, FLUORESCENCE: 128 K/UL (ref 130–450)
PMV BLD AUTO: 11.5 FL (ref 7–12)
PO2: 56.2 MMHG (ref 75–100)
POTASSIUM SERPL-SCNC: 3.5 MMOL/L (ref 3.5–5)
PROCALCITONIN SERPL-MCNC: 0.11 NG/ML (ref 0–0.08)
PROCALCITONIN SERPL-MCNC: 0.12 NG/ML (ref 0–0.08)
PROT SERPL-MCNC: 7.2 G/DL (ref 6.4–8.3)
RBC # BLD AUTO: 3.81 M/UL (ref 3.5–5.5)
RBC # BLD: ABNORMAL 10*6/UL
SODIUM SERPL-SCNC: 143 MMOL/L (ref 132–146)
SOURCE, BLOOD GAS: ABNORMAL
THB: 11.2 G/DL (ref 11.5–16.5)
TIME ANALYZED: 420
WBC OTHER # BLD: 11.5 K/UL (ref 4.5–11.5)

## 2023-08-10 PROCEDURE — 82805 BLOOD GASES W/O2 SATURATION: CPT

## 2023-08-10 PROCEDURE — 96367 TX/PROPH/DG ADDL SEQ IV INF: CPT

## 2023-08-10 PROCEDURE — 87040 BLOOD CULTURE FOR BACTERIA: CPT

## 2023-08-10 PROCEDURE — 6360000002 HC RX W HCPCS: Performed by: FAMILY MEDICINE

## 2023-08-10 PROCEDURE — 6360000002 HC RX W HCPCS: Performed by: EMERGENCY MEDICINE

## 2023-08-10 PROCEDURE — 6370000000 HC RX 637 (ALT 250 FOR IP): Performed by: FAMILY MEDICINE

## 2023-08-10 PROCEDURE — 85025 COMPLETE CBC W/AUTO DIFF WBC: CPT

## 2023-08-10 PROCEDURE — 2580000003 HC RX 258: Performed by: FAMILY MEDICINE

## 2023-08-10 PROCEDURE — 94640 AIRWAY INHALATION TREATMENT: CPT

## 2023-08-10 PROCEDURE — 84145 PROCALCITONIN (PCT): CPT

## 2023-08-10 PROCEDURE — 83735 ASSAY OF MAGNESIUM: CPT

## 2023-08-10 PROCEDURE — 96375 TX/PRO/DX INJ NEW DRUG ADDON: CPT

## 2023-08-10 PROCEDURE — 2500000003 HC RX 250 WO HCPCS: Performed by: EMERGENCY MEDICINE

## 2023-08-10 PROCEDURE — 80053 COMPREHEN METABOLIC PANEL: CPT

## 2023-08-10 PROCEDURE — 94660 CPAP INITIATION&MGMT: CPT

## 2023-08-10 PROCEDURE — 6360000002 HC RX W HCPCS

## 2023-08-10 PROCEDURE — 2580000003 HC RX 258: Performed by: EMERGENCY MEDICINE

## 2023-08-10 PROCEDURE — 6370000000 HC RX 637 (ALT 250 FOR IP): Performed by: EMERGENCY MEDICINE

## 2023-08-10 PROCEDURE — 96365 THER/PROPH/DIAG IV INF INIT: CPT

## 2023-08-10 PROCEDURE — 5A09457 ASSISTANCE WITH RESPIRATORY VENTILATION, 24-96 CONSECUTIVE HOURS, CONTINUOUS POSITIVE AIRWAY PRESSURE: ICD-10-PCS | Performed by: EMERGENCY MEDICINE

## 2023-08-10 PROCEDURE — 1200000000 HC SEMI PRIVATE

## 2023-08-10 RX ORDER — LEVETIRACETAM 500 MG/1
500 TABLET ORAL 2 TIMES DAILY
Status: DISCONTINUED | OUTPATIENT
Start: 2023-08-10 | End: 2023-08-18 | Stop reason: HOSPADM

## 2023-08-10 RX ORDER — BUDESONIDE 0.25 MG/2ML
0.25 INHALANT ORAL
Status: DISCONTINUED | OUTPATIENT
Start: 2023-08-10 | End: 2023-08-18 | Stop reason: HOSPADM

## 2023-08-10 RX ORDER — DOCUSATE SODIUM 100 MG/1
100 CAPSULE, LIQUID FILLED ORAL 2 TIMES DAILY
Status: DISCONTINUED | OUTPATIENT
Start: 2023-08-10 | End: 2023-08-18 | Stop reason: HOSPADM

## 2023-08-10 RX ORDER — POLYETHYLENE GLYCOL 3350 17 G/17G
17 POWDER, FOR SOLUTION ORAL DAILY PRN
Status: DISCONTINUED | OUTPATIENT
Start: 2023-08-10 | End: 2023-08-18 | Stop reason: HOSPADM

## 2023-08-10 RX ORDER — PROMETHAZINE HYDROCHLORIDE 12.5 MG/1
12.5 TABLET ORAL EVERY 6 HOURS PRN
Status: DISCONTINUED | OUTPATIENT
Start: 2023-08-10 | End: 2023-08-18 | Stop reason: HOSPADM

## 2023-08-10 RX ORDER — SODIUM CHLORIDE 9 MG/ML
INJECTION, SOLUTION INTRAVENOUS PRN
Status: DISCONTINUED | OUTPATIENT
Start: 2023-08-10 | End: 2023-08-18 | Stop reason: HOSPADM

## 2023-08-10 RX ORDER — AMIODARONE HYDROCHLORIDE 200 MG/1
200 TABLET ORAL DAILY
Status: DISCONTINUED | OUTPATIENT
Start: 2023-08-10 | End: 2023-08-18 | Stop reason: HOSPADM

## 2023-08-10 RX ORDER — FAMOTIDINE 20 MG/1
20 TABLET, FILM COATED ORAL 2 TIMES DAILY
Status: DISCONTINUED | OUTPATIENT
Start: 2023-08-10 | End: 2023-08-11

## 2023-08-10 RX ORDER — ACETAMINOPHEN 325 MG/1
650 TABLET ORAL EVERY 6 HOURS PRN
Status: DISCONTINUED | OUTPATIENT
Start: 2023-08-10 | End: 2023-08-18 | Stop reason: HOSPADM

## 2023-08-10 RX ORDER — FUROSEMIDE 40 MG/1
40 TABLET ORAL DAILY
Status: DISCONTINUED | OUTPATIENT
Start: 2023-08-10 | End: 2023-08-18 | Stop reason: HOSPADM

## 2023-08-10 RX ORDER — GUAIFENESIN/DEXTROMETHORPHAN 100-10MG/5
5 SYRUP ORAL 3 TIMES DAILY PRN
Status: DISCONTINUED | OUTPATIENT
Start: 2023-08-10 | End: 2023-08-18 | Stop reason: HOSPADM

## 2023-08-10 RX ORDER — HYDRALAZINE HYDROCHLORIDE 25 MG/1
25 TABLET, FILM COATED ORAL 2 TIMES DAILY
Status: DISCONTINUED | OUTPATIENT
Start: 2023-08-10 | End: 2023-08-18 | Stop reason: HOSPADM

## 2023-08-10 RX ORDER — ACETAMINOPHEN 650 MG/1
650 SUPPOSITORY RECTAL EVERY 6 HOURS PRN
Status: DISCONTINUED | OUTPATIENT
Start: 2023-08-10 | End: 2023-08-18 | Stop reason: HOSPADM

## 2023-08-10 RX ORDER — SODIUM CHLORIDE 0.9 % (FLUSH) 0.9 %
10 SYRINGE (ML) INJECTION EVERY 12 HOURS SCHEDULED
Status: DISCONTINUED | OUTPATIENT
Start: 2023-08-10 | End: 2023-08-18 | Stop reason: HOSPADM

## 2023-08-10 RX ORDER — OXYCODONE HYDROCHLORIDE 5 MG/1
5 TABLET ORAL EVERY 4 HOURS PRN
Status: DISCONTINUED | OUTPATIENT
Start: 2023-08-10 | End: 2023-08-18 | Stop reason: HOSPADM

## 2023-08-10 RX ORDER — FUROSEMIDE 10 MG/ML
20 INJECTION INTRAMUSCULAR; INTRAVENOUS ONCE
Status: COMPLETED | OUTPATIENT
Start: 2023-08-10 | End: 2023-08-10

## 2023-08-10 RX ORDER — ARFORMOTEROL TARTRATE 15 UG/2ML
15 SOLUTION RESPIRATORY (INHALATION)
Status: DISCONTINUED | OUTPATIENT
Start: 2023-08-10 | End: 2023-08-18 | Stop reason: HOSPADM

## 2023-08-10 RX ORDER — LEVOTHYROXINE SODIUM 0.07 MG/1
75 TABLET ORAL
Status: DISCONTINUED | OUTPATIENT
Start: 2023-08-10 | End: 2023-08-18 | Stop reason: HOSPADM

## 2023-08-10 RX ORDER — POLYVINYL ALCOHOL 14 MG/ML
1 SOLUTION/ DROPS OPHTHALMIC PRN
Status: DISCONTINUED | OUTPATIENT
Start: 2023-08-10 | End: 2023-08-18 | Stop reason: HOSPADM

## 2023-08-10 RX ORDER — POTASSIUM CHLORIDE 20 MEQ/1
20 TABLET, EXTENDED RELEASE ORAL DAILY
Status: DISCONTINUED | OUTPATIENT
Start: 2023-08-10 | End: 2023-08-18 | Stop reason: HOSPADM

## 2023-08-10 RX ORDER — SODIUM CHLORIDE 0.9 % (FLUSH) 0.9 %
10 SYRINGE (ML) INJECTION PRN
Status: DISCONTINUED | OUTPATIENT
Start: 2023-08-10 | End: 2023-08-18 | Stop reason: HOSPADM

## 2023-08-10 RX ORDER — FERROUS SULFATE 325(65) MG
325 TABLET ORAL 2 TIMES DAILY WITH MEALS
Status: DISCONTINUED | OUTPATIENT
Start: 2023-08-10 | End: 2023-08-18 | Stop reason: HOSPADM

## 2023-08-10 RX ORDER — DULOXETIN HYDROCHLORIDE 30 MG/1
60 CAPSULE, DELAYED RELEASE ORAL DAILY
Status: DISCONTINUED | OUTPATIENT
Start: 2023-08-10 | End: 2023-08-18 | Stop reason: HOSPADM

## 2023-08-10 RX ORDER — DESMOPRESSIN ACETATE 0.1 MG/1
200 TABLET ORAL 2 TIMES DAILY
Status: DISCONTINUED | OUTPATIENT
Start: 2023-08-10 | End: 2023-08-18 | Stop reason: HOSPADM

## 2023-08-10 RX ORDER — ONDANSETRON 2 MG/ML
4 INJECTION INTRAMUSCULAR; INTRAVENOUS EVERY 6 HOURS PRN
Status: DISCONTINUED | OUTPATIENT
Start: 2023-08-10 | End: 2023-08-18 | Stop reason: HOSPADM

## 2023-08-10 RX ORDER — AMLODIPINE BESYLATE 10 MG/1
10 TABLET ORAL DAILY
Status: DISCONTINUED | OUTPATIENT
Start: 2023-08-10 | End: 2023-08-14

## 2023-08-10 RX ORDER — HYDRALAZINE HYDROCHLORIDE 25 MG/1
25 TABLET, FILM COATED ORAL 2 TIMES DAILY
Status: ON HOLD | COMMUNITY
End: 2023-08-15 | Stop reason: HOSPADM

## 2023-08-10 RX ORDER — DESMOPRESSIN ACETATE 0.1 MG/1
0.2 TABLET ORAL 2 TIMES DAILY
Status: ON HOLD | COMMUNITY
End: 2023-08-15 | Stop reason: HOSPADM

## 2023-08-10 RX ORDER — ALBUTEROL SULFATE 2.5 MG/3ML
2.5 SOLUTION RESPIRATORY (INHALATION) EVERY 6 HOURS PRN
Status: DISCONTINUED | OUTPATIENT
Start: 2023-08-10 | End: 2023-08-18 | Stop reason: HOSPADM

## 2023-08-10 RX ORDER — METOPROLOL SUCCINATE 50 MG/1
50 TABLET, EXTENDED RELEASE ORAL 2 TIMES DAILY
Status: DISCONTINUED | OUTPATIENT
Start: 2023-08-10 | End: 2023-08-18 | Stop reason: HOSPADM

## 2023-08-10 RX ADMIN — DESMOPRESSIN ACETATE 200 MCG: 0.1 TABLET ORAL at 13:20

## 2023-08-10 RX ADMIN — IPRATROPIUM BROMIDE AND ALBUTEROL SULFATE 1 DOSE: .5; 2.5 SOLUTION RESPIRATORY (INHALATION) at 00:43

## 2023-08-10 RX ADMIN — METOPROLOL SUCCINATE 50 MG: 50 TABLET, EXTENDED RELEASE ORAL at 21:44

## 2023-08-10 RX ADMIN — FUROSEMIDE 40 MG: 20 TABLET ORAL at 09:44

## 2023-08-10 RX ADMIN — DOXYCYCLINE 100 MG: 100 INJECTION, POWDER, LYOPHILIZED, FOR SOLUTION INTRAVENOUS at 03:25

## 2023-08-10 RX ADMIN — CEFEPIME 2000 MG: 2 INJECTION, POWDER, FOR SOLUTION INTRAVENOUS at 02:53

## 2023-08-10 RX ADMIN — Medication 10 ML: at 09:00

## 2023-08-10 RX ADMIN — BUDESONIDE 250 MCG: 0.25 SUSPENSION RESPIRATORY (INHALATION) at 20:49

## 2023-08-10 RX ADMIN — AMLODIPINE BESYLATE 10 MG: 10 TABLET ORAL at 09:43

## 2023-08-10 RX ADMIN — HYDRALAZINE HYDROCHLORIDE 25 MG: 25 TABLET, FILM COATED ORAL at 09:44

## 2023-08-10 RX ADMIN — FUROSEMIDE 20 MG: 10 INJECTION, SOLUTION INTRAMUSCULAR; INTRAVENOUS at 13:19

## 2023-08-10 RX ADMIN — AMIODARONE HYDROCHLORIDE 200 MG: 200 TABLET ORAL at 09:44

## 2023-08-10 RX ADMIN — POTASSIUM CHLORIDE 20 MEQ: 1500 TABLET, EXTENDED RELEASE ORAL at 09:44

## 2023-08-10 RX ADMIN — DESMOPRESSIN ACETATE 200 MCG: 0.1 TABLET ORAL at 21:45

## 2023-08-10 RX ADMIN — APIXABAN 5 MG: 5 TABLET, FILM COATED ORAL at 09:44

## 2023-08-10 RX ADMIN — BUDESONIDE 250 MCG: 0.25 SUSPENSION RESPIRATORY (INHALATION) at 07:02

## 2023-08-10 RX ADMIN — Medication 10 ML: at 21:45

## 2023-08-10 RX ADMIN — LEVOTHYROXINE SODIUM 75 MCG: 0.07 TABLET ORAL at 09:43

## 2023-08-10 RX ADMIN — ARFORMOTEROL TARTRATE 15 MCG: 15 SOLUTION RESPIRATORY (INHALATION) at 20:49

## 2023-08-10 RX ADMIN — ARFORMOTEROL TARTRATE 15 MCG: 15 SOLUTION RESPIRATORY (INHALATION) at 07:02

## 2023-08-10 RX ADMIN — METOPROLOL SUCCINATE 50 MG: 50 TABLET, EXTENDED RELEASE ORAL at 09:44

## 2023-08-10 RX ADMIN — APIXABAN 5 MG: 5 TABLET, FILM COATED ORAL at 21:44

## 2023-08-10 RX ADMIN — LEVETIRACETAM 500 MG: 500 TABLET, FILM COATED ORAL at 09:45

## 2023-08-10 RX ADMIN — DOCUSATE SODIUM 100 MG: 100 CAPSULE, LIQUID FILLED ORAL at 09:45

## 2023-08-10 RX ADMIN — METHYLPREDNISOLONE SODIUM SUCCINATE 125 MG: 125 INJECTION, POWDER, LYOPHILIZED, FOR SOLUTION INTRAMUSCULAR; INTRAVENOUS at 06:00

## 2023-08-10 RX ADMIN — LEVETIRACETAM 500 MG: 500 TABLET, FILM COATED ORAL at 21:44

## 2023-08-10 RX ADMIN — ONDANSETRON 4 MG: 2 INJECTION INTRAMUSCULAR; INTRAVENOUS at 12:09

## 2023-08-10 RX ADMIN — OXYCODONE HYDROCHLORIDE 5 MG: 5 TABLET ORAL at 23:17

## 2023-08-10 RX ADMIN — FERROUS SULFATE TAB 325 MG (65 MG ELEMENTAL FE) 325 MG: 325 (65 FE) TAB at 09:44

## 2023-08-10 RX ADMIN — DULOXETINE HYDROCHLORIDE 60 MG: 60 CAPSULE, DELAYED RELEASE ORAL at 09:43

## 2023-08-10 RX ADMIN — DOCUSATE SODIUM 100 MG: 100 CAPSULE, LIQUID FILLED ORAL at 21:44

## 2023-08-10 RX ADMIN — FAMOTIDINE 20 MG: 20 TABLET ORAL at 21:43

## 2023-08-10 RX ADMIN — FAMOTIDINE 20 MG: 20 TABLET ORAL at 09:44

## 2023-08-10 ASSESSMENT — PAIN SCALES - GENERAL: PAINLEVEL_OUTOF10: 7

## 2023-08-10 ASSESSMENT — PAIN DESCRIPTION - ORIENTATION: ORIENTATION: RIGHT;LEFT

## 2023-08-10 ASSESSMENT — PAIN DESCRIPTION - LOCATION: LOCATION: HIP

## 2023-08-10 ASSESSMENT — PAIN DESCRIPTION - DESCRIPTORS: DESCRIPTORS: ACHING

## 2023-08-10 NOTE — H&P
Hospitalist History & Physical      PCP: Eli Marsh MD    Date of Service: Pt seen/examined on 8/10/2023     Chief Complaint:  had concerns including Other (Pt normally on 6L nasal cannula. Ems reported pt was 82% at home. Pt was found laying flat and on a portable oxygen tank. Per family, the oxygen delivery wont be available until tomorrow. Pt oxygen 96% on 6L for EMS). History Of Present Illness:    Ms. Adiel Graham, a 78y.o. year old female  who  has a past medical history of A-fib ([address]), Abnormal mammogram, Acute on chronic respiratory failure ([address]), Anemia, Anemia due to chronic illness, Ankle fracture, left, 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[address]), Backache, Benign hypertension, CHF (congestive heart failure) ([address]), Chronic back pain, Chronic kidney disease, Chronic pain disorder, Chronic, continuous use of opioids, Compression fracture of thoracic vertebra ([address]), COPD (chronic obstructive pulmonary disease) ([address]), Debility, Deformity of ankle and foot, acquired, Depression, Diabetes insipidus ([address]), Diverticulitis, Dry eyes, Encephalopathy acute, Gait disturbance, GERD (gastroesophageal reflux disease), Hemorrhoids, Hernia, History of blood transfusion, History of Clostridium difficile, Hyperlipidemia, Hypothyroidism, Ischemic colitis, enteritis, or enterocolitis ([address]), Long term (current) use of systemic steroids, Long-term current use of steroids, Lumbar disc herniation, Myalgia and myositis, unspecified, Nephrosclerosis, Nonunion of fracture, Osteoarthritis, PAF (paroxysmal atrial fibrillation) ([address]), 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[address]), Pulmonary hypertension ([address]), Pulmonary sarcoidosis ([address]), Rectal bleeding, Rhabdomyolysis, Steroid-induced avascular necrosis of shoulder ([address]), Tibia fracture, and Ventral hernia without obstruction or gangrene. Patient presented to the emergency department is running out of her home O2. Patient was found lying on her back satting 82% at home.   She size.  Bilateral shoulder degenerative changes. Previous bone cement augmentation within the spine. 1. Hazy bibasilar opacities likely atelectasis/scar or inflammatory disease. 2. Hyperinflation. XR CHEST PORTABLE    Result Date: 7/13/2023  EXAMINATION: ONE XRAY VIEW OF THE CHEST 7/13/2023 3:01 pm COMPARISON: None. HISTORY: ORDERING SYSTEM PROVIDED HISTORY: Pneumonia TECHNOLOGIST PROVIDED HISTORY: Reason for exam:->Pneumonia What reading provider will be dictating this exam?->CRC FINDINGS: The lungs are without acute focal process. There is no effusion or pneumothorax. Stable heart size. The osseous structures are without acute process. No definitive evidence for pneumonia. ASSESSMENT:  -Acute on chronic respiratory failure with hypoxia  -Ran out of home O2  -Possible pneumonia  -Hypertension  -Hyperlipidemia  -Atrial fibrillation      PLAN:  -Admit to medicine  -Check procalcitonin  -Hold antibiotics  -If procalcitonin comes back elevated start antibiotics  -Telemetry  -Continue home medications        Diet: No diet orders on file  Code Status: Prior  Surrogate decision maker confirmed with patient:   Extended Emergency Contact Information  Primary Emergency Contact: [facility]  Address: [address] [facility], [address] [facility] [address] [address] [facility] Phone: [phone]  Mobile Phone: [phone]  Relation: Spouse  Secondary Emergency Contact: [address] [place] [address]  Mobile Phone: [phone]  Relation: Child    DVT Prophylaxis: []Lovenox []Heparin []PCD [] [address] []Encouraged ambulation  Disposition: []Med/Surg [] Intermediate [] ICU/CCU  Admit status: [] Observation [] Inpatient     +++++++++++++++++++++++++++++++++++++++++++++++++  Wander Pham DO  +++++++++++++++++++++++++++++++++++++++++++++++++  NOTE: This report was transcribed using voice recognition software.  Every effort was made to ensure accuracy; however, inadvertent computerized transcription errors may

## 2023-08-10 NOTE — PROGRESS NOTES
Date: 8/10/2023    Time: 7:34 PM    Patient Placed On BIPAP/CPAP/ Non-Invasive Ventilation? Yes    If no must comment. Facial area red/color change? No           If YES are Blister/Lesion present? No   If yes must notify nursing staff  BIPAP/CPAP skin barrier?   Yes    Skin barrier type:mepilexlite       Comments:        Reggie Jones RCP

## 2023-08-10 NOTE — ED NOTES
Patient pursed lip breathing on 6L NC. Patient placed on 15L nonrebreather now at 94%. ED attending made aware. Bipap order placed.       Michael Smith RN  08/10/23 [address], RN  08/10/23 [id number]

## 2023-08-10 NOTE — TELEPHONE ENCOUNTER
Writer contacted Dr. Rody Phillip to inform of 30 day readmission risk. Writer's attempt to contact Dr. Rody Phillip was unsuccessful.     Call Back: If you need to call back to inform of disposition you can contact me at [phone]

## 2023-08-10 NOTE — PROGRESS NOTES
Hospitalist Progress Note      Synopsis:   Briefly, patient with PMH significant for A-fib, anemia, CHF, CKD, COPD, DM, GERD, HPL, pulmonary HTN, pulmonary sarcoidosis, presented to ED for acute hypoxic respiratory failure. Sleep patient ran out of home oxygen and was found lying on floor satting 82% at home. Patient was started on nasal cannula O2, still had some respiratory distress and was placed on nonrebreather and subsequently BiPAP. Patient has had numerous hospital admissions for same complaint, has been on IV antibiotics for presumed pneumonia though likely patient is experiencing exacerbation of CHF. Pro-Paul elevated however likely in the setting of chronic kidney disease, as patient has no other signs of infection such as white count fever. Denies fever aches or chills. Will monitor off antibiotics for now, and consider antibiotics should patient develop leukocytosis. Patient seen and examined at bedside, records reviewed, nonbillable rounding. Assessment:  Acute on chronic hypoxic respiratory failure  History of pulmonary sarcoidosis  History of atrial fibrillation  History of heart failure  History of seizure  History of diabetes insipidus  Hypothyroidism  Chronic kidney disease  Anemia of chronic disease    Plan: Will monitor off IV antibiotics for now, DuoNebs every 4 hours, will trial 1 dose of Lasix IV. Will check respiratory culture, respiratory panel, blood cultures, Legionella and strep antigen. Will consider pulmonology consultation should respiratory status continued to decline  Continue chronic steroid and rest of home medications as appropriate  Follow labs replete as indicated    +++++++++++++++++++++++++++++++++++++++++++++++++  Stephy Beverly, [address] [place] [address]  +++++++++++++++++++++++++++++++++++++++++++++++++  NOTE: This report was transcribed using voice recognition software.  Every effort was

## 2023-08-11 ENCOUNTER — APPOINTMENT (OUTPATIENT)
Dept: ULTRASOUND IMAGING | Age: 68
End: 2023-08-11
Payer: MEDICARE

## 2023-08-11 LAB
ANION GAP SERPL CALCULATED.3IONS-SCNC: 9 MMOL/L (ref 7–16)
BUN SERPL-MCNC: 35 MG/DL (ref 6–23)
CALCIUM SERPL-MCNC: 9.1 MG/DL (ref 8.6–10.2)
CHLORIDE SERPL-SCNC: 99 MMOL/L (ref 98–107)
CO2 SERPL-SCNC: 33 MMOL/L (ref 22–29)
CREAT SERPL-MCNC: 2 MG/DL (ref 0.5–1)
EKG ATRIAL RATE: 69 BPM
EKG P AXIS: 86 DEGREES
EKG P-R INTERVAL: 138 MS
EKG Q-T INTERVAL: 392 MS
EKG QRS DURATION: 86 MS
EKG QTC CALCULATION (BAZETT): 420 MS
EKG R AXIS: 89 DEGREES
EKG T AXIS: 80 DEGREES
EKG VENTRICULAR RATE: 69 BPM
ERYTHROCYTE [DISTWIDTH] IN BLOOD BY AUTOMATED COUNT: 16.3 % (ref 11.5–15)
GFR SERPL CREATININE-BSD FRML MDRD: 27 ML/MIN/1.73M2
GLUCOSE SERPL-MCNC: 83 MG/DL (ref 74–99)
HCT VFR BLD AUTO: 29.4 % (ref 34–48)
HGB BLD-MCNC: 8.6 G/DL (ref 11.5–15.5)
MCH RBC QN AUTO: 25.2 PG (ref 26–35)
MCHC RBC AUTO-ENTMCNC: 29.3 G/DL (ref 32–34.5)
MCV RBC AUTO: 86.2 FL (ref 80–99.9)
PLATELET, FLUORESCENCE: 116 K/UL (ref 130–450)
PMV BLD AUTO: 12.5 FL (ref 7–12)
POTASSIUM SERPL-SCNC: 3.9 MMOL/L (ref 3.5–5)
RBC # BLD AUTO: 3.41 M/UL (ref 3.5–5.5)
SODIUM SERPL-SCNC: 141 MMOL/L (ref 132–146)
WBC OTHER # BLD: 10.9 K/UL (ref 4.5–11.5)

## 2023-08-11 PROCEDURE — 6360000002 HC RX W HCPCS: Performed by: INTERNAL MEDICINE

## 2023-08-11 PROCEDURE — 6370000000 HC RX 637 (ALT 250 FOR IP)

## 2023-08-11 PROCEDURE — 76770 US EXAM ABDO BACK WALL COMP: CPT

## 2023-08-11 PROCEDURE — 87070 CULTURE OTHR SPECIMN AEROBIC: CPT

## 2023-08-11 PROCEDURE — 6360000002 HC RX W HCPCS: Performed by: FAMILY MEDICINE

## 2023-08-11 PROCEDURE — 36415 COLL VENOUS BLD VENIPUNCTURE: CPT

## 2023-08-11 PROCEDURE — 2580000003 HC RX 258: Performed by: FAMILY MEDICINE

## 2023-08-11 PROCEDURE — 87205 SMEAR GRAM STAIN: CPT

## 2023-08-11 PROCEDURE — 94660 CPAP INITIATION&MGMT: CPT

## 2023-08-11 PROCEDURE — 6370000000 HC RX 637 (ALT 250 FOR IP): Performed by: FAMILY MEDICINE

## 2023-08-11 PROCEDURE — 97165 OT EVAL LOW COMPLEX 30 MIN: CPT

## 2023-08-11 PROCEDURE — 2580000003 HC RX 258

## 2023-08-11 PROCEDURE — 87081 CULTURE SCREEN ONLY: CPT

## 2023-08-11 PROCEDURE — 97535 SELF CARE MNGMENT TRAINING: CPT

## 2023-08-11 PROCEDURE — 80048 BASIC METABOLIC PNL TOTAL CA: CPT

## 2023-08-11 PROCEDURE — 6370000000 HC RX 637 (ALT 250 FOR IP): Performed by: INTERNAL MEDICINE

## 2023-08-11 PROCEDURE — 1200000000 HC SEMI PRIVATE

## 2023-08-11 PROCEDURE — 94640 AIRWAY INHALATION TREATMENT: CPT

## 2023-08-11 PROCEDURE — 85027 COMPLETE CBC AUTOMATED: CPT

## 2023-08-11 PROCEDURE — 2580000003 HC RX 258: Performed by: INTERNAL MEDICINE

## 2023-08-11 RX ORDER — IPRATROPIUM BROMIDE AND ALBUTEROL SULFATE 2.5; .5 MG/3ML; MG/3ML
1 SOLUTION RESPIRATORY (INHALATION)
Status: DISCONTINUED | OUTPATIENT
Start: 2023-08-11 | End: 2023-08-18 | Stop reason: HOSPADM

## 2023-08-11 RX ORDER — PREDNISONE 5 MG/1
5 TABLET ORAL DAILY
Status: DISCONTINUED | OUTPATIENT
Start: 2023-08-11 | End: 2023-08-18 | Stop reason: HOSPADM

## 2023-08-11 RX ORDER — FAMOTIDINE 20 MG/1
20 TABLET, FILM COATED ORAL DAILY
Status: DISCONTINUED | OUTPATIENT
Start: 2023-08-12 | End: 2023-08-18 | Stop reason: HOSPADM

## 2023-08-11 RX ORDER — SODIUM CHLORIDE 9 MG/ML
INJECTION, SOLUTION INTRAVENOUS CONTINUOUS
Status: DISCONTINUED | OUTPATIENT
Start: 2023-08-11 | End: 2023-08-13

## 2023-08-11 RX ADMIN — MEROPENEM 1000 MG: 1 INJECTION, POWDER, FOR SOLUTION INTRAVENOUS at 18:14

## 2023-08-11 RX ADMIN — DOCUSATE SODIUM 100 MG: 100 CAPSULE, LIQUID FILLED ORAL at 09:07

## 2023-08-11 RX ADMIN — ARFORMOTEROL TARTRATE 15 MCG: 15 SOLUTION RESPIRATORY (INHALATION) at 08:35

## 2023-08-11 RX ADMIN — DULOXETINE HYDROCHLORIDE 60 MG: 60 CAPSULE, DELAYED RELEASE ORAL at 09:07

## 2023-08-11 RX ADMIN — DESMOPRESSIN ACETATE 200 MCG: 0.1 TABLET ORAL at 09:06

## 2023-08-11 RX ADMIN — LEVETIRACETAM 500 MG: 500 TABLET, FILM COATED ORAL at 20:56

## 2023-08-11 RX ADMIN — FERROUS SULFATE TAB 325 MG (65 MG ELEMENTAL FE) 325 MG: 325 (65 FE) TAB at 09:07

## 2023-08-11 RX ADMIN — Medication 10 ML: at 09:07

## 2023-08-11 RX ADMIN — IPRATROPIUM BROMIDE AND ALBUTEROL SULFATE 1 DOSE: .5; 2.5 SOLUTION RESPIRATORY (INHALATION) at 11:37

## 2023-08-11 RX ADMIN — APIXABAN 5 MG: 5 TABLET, FILM COATED ORAL at 20:56

## 2023-08-11 RX ADMIN — ARFORMOTEROL TARTRATE 15 MCG: 15 SOLUTION RESPIRATORY (INHALATION) at 19:21

## 2023-08-11 RX ADMIN — OXYCODONE HYDROCHLORIDE 5 MG: 5 TABLET ORAL at 09:05

## 2023-08-11 RX ADMIN — FAMOTIDINE 20 MG: 20 TABLET ORAL at 09:07

## 2023-08-11 RX ADMIN — FERROUS SULFATE TAB 325 MG (65 MG ELEMENTAL FE) 325 MG: 325 (65 FE) TAB at 16:05

## 2023-08-11 RX ADMIN — METOPROLOL SUCCINATE 50 MG: 50 TABLET, EXTENDED RELEASE ORAL at 09:07

## 2023-08-11 RX ADMIN — APIXABAN 5 MG: 5 TABLET, FILM COATED ORAL at 09:07

## 2023-08-11 RX ADMIN — SODIUM CHLORIDE: 9 INJECTION, SOLUTION INTRAVENOUS at 11:13

## 2023-08-11 RX ADMIN — PREDNISONE 5 MG: 5 TABLET ORAL at 14:49

## 2023-08-11 RX ADMIN — PROMETHAZINE HYDROCHLORIDE 12.5 MG: 12.5 TABLET ORAL at 09:06

## 2023-08-11 RX ADMIN — LEVOTHYROXINE SODIUM 75 MCG: 0.07 TABLET ORAL at 05:38

## 2023-08-11 RX ADMIN — BUDESONIDE 250 MCG: 0.25 SUSPENSION RESPIRATORY (INHALATION) at 08:35

## 2023-08-11 RX ADMIN — POTASSIUM CHLORIDE 20 MEQ: 1500 TABLET, EXTENDED RELEASE ORAL at 09:07

## 2023-08-11 RX ADMIN — OXYCODONE HYDROCHLORIDE 5 MG: 5 TABLET ORAL at 20:56

## 2023-08-11 RX ADMIN — IPRATROPIUM BROMIDE AND ALBUTEROL SULFATE 1 DOSE: .5; 2.5 SOLUTION RESPIRATORY (INHALATION) at 19:21

## 2023-08-11 RX ADMIN — LEVETIRACETAM 500 MG: 500 TABLET, FILM COATED ORAL at 09:07

## 2023-08-11 RX ADMIN — AMIODARONE HYDROCHLORIDE 200 MG: 200 TABLET ORAL at 09:07

## 2023-08-11 RX ADMIN — BUDESONIDE 250 MCG: 0.25 SUSPENSION RESPIRATORY (INHALATION) at 19:20

## 2023-08-11 RX ADMIN — IPRATROPIUM BROMIDE AND ALBUTEROL SULFATE 1 DOSE: .5; 2.5 SOLUTION RESPIRATORY (INHALATION) at 16:11

## 2023-08-11 RX ADMIN — DOCUSATE SODIUM 100 MG: 100 CAPSULE, LIQUID FILLED ORAL at 20:56

## 2023-08-11 RX ADMIN — DESMOPRESSIN ACETATE 200 MCG: 0.1 TABLET ORAL at 21:00

## 2023-08-11 ASSESSMENT — PAIN DESCRIPTION - DESCRIPTORS
DESCRIPTORS: ACHING
DESCRIPTORS: ACHING;CRAMPING;DISCOMFORT

## 2023-08-11 ASSESSMENT — PAIN SCALES - GENERAL
PAINLEVEL_OUTOF10: 9
PAINLEVEL_OUTOF10: 7

## 2023-08-11 ASSESSMENT — PAIN DESCRIPTION - LOCATION
LOCATION: ARM;HIP
LOCATION: ARM;CHEST

## 2023-08-11 ASSESSMENT — PAIN DESCRIPTION - ORIENTATION: ORIENTATION: RIGHT

## 2023-08-11 ASSESSMENT — PAIN - FUNCTIONAL ASSESSMENT: PAIN_FUNCTIONAL_ASSESSMENT: ACTIVITIES ARE NOT PREVENTED

## 2023-08-11 NOTE — PROGRESS NOTES
This nurse and other attempted to place IV and were unsuccessful.  Messaged IV team via perfect serve -- awaiting response

## 2023-08-11 NOTE — PROGRESS NOTES
Physical Therapy    Date: 2023       Patient Name: Mahogany Rowe  : 1955      MRN: [id number]    Physical therapy order received and chart reviewed. PT evaluation attempted. Pt declined at this time as she wanted to finish breakfast and her \"stats were down\". Pt requested PT return at later time. Will follow up as schedule allows.      Ronny Ragsdale PT, DPT  [id number]

## 2023-08-11 NOTE — CARE COORDINATION
Patient is a re-admit, presented to the ED after being found to be 82% at home, baseline O2 is 6L through [facility]; admitted for acute on chronic respiratory failure. Spoke with patient's , Laverle Shone for transition of care planning. He began yelling, expressed frustration with the patient being hospitalized again and prefers his home phone be called first. Inquired about what happened with patient's concentrator/portable tanks, he began to raise his voice and stated \" none of them work and I am bringing her home\". He states previous admission patient was discharged too early in his opinion. Pt lives with her  in a 2 story home with ramped entrance, first floor set up; has a cpap ([employer]), wc, walker, bsc, and sc. Patient requires assist with some ADLs and IADLs, active with [facility]. Hx with [facility] and [facility] in the past. He reports she will need ambulance transport home. Per nursing, patient currently on bipap, FiO2 60%, chest XR showed worsening interstitial opacities, pending PT/OT evals. Call made to Keena at [facility], patient is active with skilled nursing and PT; will need resumption of care orders. Ambulance form and envelope will need completed. Case Management Assessment  Initial Evaluation    Date/Time of Evaluation: 8/11/2023 10:17 AM  Assessment Completed by: Judi Galvez    If patient is discharged prior to next notation, then this note serves as note for discharge by case management.     Patient Name: Mary Wu                   YOB: 1955  Diagnosis: Chronic respiratory failure with hypoxia ([address]) [J96.11]  Acute on chronic respiratory failure ([address]) [J96.20]  Pneumonia of both lungs due to infectious organism, unspecified part of lung [J18.9]                   Date / Time: 8/9/2023  9:51 PM    Patient Admission Status: Inpatient   Readmission Risk (Low < 19, Mod (19-27), High > 27): Readmission Risk Score: 41.3    Current PCP: Manjit Duque, MD  PCP verified by CM? Yes    Chart Reviewed: Yes      History Provided by: Spouse, Medical Record  Patient Orientation: Alert and Oriented    Patient Cognition: Alert    Hospitalization in the last 30 days (Readmission):  Yes    If yes, Readmission Assessment in  Navigator will be completed. Advance Directives:      Code Status: Full Code   Patient's Primary Decision Maker is: Legal Next of Kin    Primary Decision Maker: [id number] [phone]    Secondary Decision Maker: Higinio Burkett Child - [phone]    Discharge Planning:    Patient lives with:   Type of Home:    Primary Care Giver: Spouse  Patient Support Systems include: Spouse/Significant Other, Family Members   Current Financial resources:    Current community resources:    Current services prior to admission:              Current DME:              Type of Home Care services:       ADLS  Prior functional level: Assistance with the following:, Bathing, Dressing, Toileting, Mobility  Current functional level: Assistance with the following:, Bathing, Dressing, Toileting, Mobility    PT AM-PAC:   /24  OT AM-PAC:   /24    Family can provide assistance at DC: Yes  Would you like Case Management to discuss the discharge plan with any other family members/significant others, and if so, who?  No  Plans to Return to Present Housing: Yes  Other Identified Issues/Barriers to RETURNING to current housing: n/a  Potential Assistance needed at discharge:              Potential DME:    Patient expects to discharge to:    Plan for transportation at discharge:      Financial    Payor: Saran Paiz / Plan: Lebron Stevenson / Product Type: *No Product type* /     Does insurance require precert for SNF: Yes    Potential assistance Purchasing Medications:    Meds-to-Beds request: No      [facility] [employer], [address] [facility] [phone] Ameena Aranda [address] [facility]  [facility] [address]  Phone: [phone]

## 2023-08-11 NOTE — ACP (ADVANCE CARE PLANNING)
Advance Care Planning   Healthcare Decision Maker:    Primary Decision Maker: [address] [phone]    Secondary Decision Maker: Nicol Lea [facility] [phone]    Today we documented Decision Maker(s) consistent with Legal Next of Kin hierarchy.     Electronically signed by Willie Jesus on 8/11/2023 at 10:25 AM

## 2023-08-11 NOTE — PROGRESS NOTES
Outpatient primary care called regarding patient ID follow up as outpatient in September. Attending notified via perfect serve and consult placed for ID.

## 2023-08-11 NOTE — PROGRESS NOTES
OCCUPATIONAL THERAPY INITIAL EVALUATION    KARAN Shannonlara [address] [place] [address]   [address]                                                  Patient Name: Rory Thurman  MRN: [id number]  : 1955  Room: [address] [place] [address]    Evaluating OT: Antony JAELYN Stein, OTR/L  # [id number]    Referring Provider:  FRIDA Felder - CNP  Specific Provider Orders:  Jaz Morales and Treat\"  23    Diagnosis: Chronic respiratory failure with hypoxia (HCC) [J96.11]  Acute on chronic respiratory failure (HCC) [J96.20]  Pneumonia of both lungs due to infectious organism, unspecified part of lung [J18.9]    Pt was admitted w/ SOB, Hypoxia, increased O2 requirements    Pertinent Medical History:  Pt has a past medical history of A-fib ([address]), Abnormal mammogram, Acute on chronic respiratory failure ([address]), Anemia, Anemia due to chronic illness, Ankle fracture, left, 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[address]), Backache, Benign hypertension, CHF (congestive heart failure) (HCC), Chronic back pain, Chronic kidney disease, Chronic pain disorder, Chronic, continuous use of opioids, Compression fracture of thoracic vertebra (HCC), COPD (chronic obstructive pulmonary disease) ([address]), Debility, Deformity of ankle and foot, acquired, Depression, Diabetes insipidus ([address]), Diverticulitis, Dry eyes, Encephalopathy acute, Gait disturbance, GERD (gastroesophageal reflux disease), Hemorrhoids, Hernia, History of blood transfusion, History of Clostridium difficile, Hyperlipidemia, Hypothyroidism, Ischemic colitis, enteritis, or enterocolitis ([address]), Long term (current) use of systemic steroids, Long-term current use of steroids, Lumbar disc herniation, Myalgia and myositis, unspecified, Nephrosclerosis, Nonunion of fracture, Osteoarthritis, PAF (paroxysmal atrial fibrillation) ([address]), 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[address]), Pulmonary hypertension ([address]), Pulmonary sarcoidosis functional ax   Cognitive retraining -  Cues for safety/safety awareness, sequencing, problem solving    Skilled monitoring of Vitals during session and pt's response to tx ax    Pt/family ed re: benefits of participate in post-acute therapy program;  Sub-acute Rehab vs HH Therapy      Consulted RN      Made all appropriate Environmental Modifications to facilitate pt's level of IND and safety. Recommendations for Continued Participation in OT services during Hospitalization and at D/C     Pt and/or Family verbalized/demonstrated a Good understanding of education provided. Will Review PRN. Rehab Potential: Good(-) for established goals     Patient / Family Goal: Not stated at this time      Patient and/or family were instructed on functional diagnosis, prognosis/goals and OT plan of care. Demonstrated Good understanding. Eval Complexity: Low    Time In: 1234  Time Out: 1327  Total Treatment Time: 39 minutes    Min Units   OT Eval Low 97165  X  1   OT Eval Medium 01166      OT Eval High 53058      OT Re-Eval [id number]       Therapeutic Ex 83389       Therapeutic Activities 62546       ADL/Self Care 36468  39  3   Orthotic Management 36572       Manual 36380     Neuro Re-Ed 48314       Non-Billable Time              Evaluation Time additionally includes thorough review of current medical information, gathering information on past medical history/social history and prior level of function, completion of standardized testing/informal observation of tasks, assessment of data and education on plan of care and goals.             JAELYN Patiño, OTR/L  # [id number]

## 2023-08-11 NOTE — CONSULTS
Tibia fracture 2010    Ventral hernia without obstruction or gangrene 07/10/2022       Past Surgical History:   Procedure Laterality Date    ABDOMEN SURGERY      ischemic colitis    COLONOSCOPY      healed colitis    COLONOSCOPY  2014    COLONOSCOPY  2015    severe colitis    COLONOSCOPY  10/24/2016    COLONOSCOPY  2017    ECHO COMPL W DOP COLOR FLOW  2015         ECHO COMPLETE  2013         FRACTURE SURGERY  2010    Tibial right    HEMORRHOID SURGERY  2016    KYPHOSIS SURGERY      For comp.  fx T10    LUMBAR DISC SURGERY      OTHER SURGICAL HISTORY  11    removal r leg external fixator    OTHER SURGICAL HISTORY  2021    Partial Vaginectomy, Endometrial Biopsy    SIGMOIDOSCOPY N/A 3/19/2021    SIGMOIDOSCOPY HEMORRHOID BANDING performed by Cj Mai MD at [facility] LENGTHENING      application TSF      UPPER GASTROINTESTINAL ENDOSCOPY  2016    UPPER GASTROINTESTINAL ENDOSCOPY  2017       Family History   Problem Relation Age of Onset    Diabetes Mother     Arthritis Mother     High Blood Pressure Mother     Arthritis Father     High Blood Pressure Father     Diabetes Father     High Blood Pressure Sister     Alcohol Abuse Sister     Substance Abuse Brother     Substance Abuse Sister     Coronary Art Dis Neg Hx     Breast Cancer Neg Hx     Ovarian Cancer Neg Hx        Social History:   Social History     Socioeconomic History    Marital status:      Spouse name: Not on file    Number of children: Not on file    Years of education: Not on file    Highest education level: Not on file   Occupational History    Occupation: disability -[employer]   Tobacco Use    Smoking status: Former     Packs/day: 0.50     Years: 25.00     Pack years: 12.50     Types: Cigarettes     Start date:      Quit date: 9/10/1996     Years since quittin.9    Smokeless tobacco: Never    Tobacco comments:     Patient quit smoking 26 yrs.ago. Vaping Use    Vaping Use: Never used    Passive vaping exposure: Yes   Substance and Sexual Activity    Alcohol use: Never     Alcohol/week: 0.0 standard drinks    Drug use: Not Currently     Comment: 1996 coccaine    Sexual activity: Not Currently   Other Topics Concern    Not on file   Social History Narrative    1 child, 2 step children,  for 20 years. Social Determinants of Health     Financial Resource Strain: Medium Risk    Difficulty of Paying Living Expenses: Somewhat hard   Food Insecurity: No Food Insecurity    Worried About Running Out of Food in the Last Year: Never true    Ran Out of Food in the Last Year: Never true   Transportation Needs: Unknown    Lack of Transportation (Medical): Not on file    Lack of Transportation (Non-Medical): No   Physical Activity: Not on file   Stress: Not on file   Social Connections: Not on file   Intimate Partner Violence: Not on file   Housing Stability: Unknown    Unable to Pay for Housing in the Last Year: Not on file    Number of Places Lived in the Last Year: Not on file    Unstable Housing in the Last Year: No     Smoking history: The patient is a former 25 pack year smoker and has been in remission since 1996    ETOH:   reports no history of alcohol use. Exposures: There  is not history of TB or TB exposure. There is not asbestos or silica dust exposure. The patient reports does not have coal, foundry, quarry or [employer] exposure. Recent travel history none. There is not  history of recreational or IV drug use. There is not hot tub exposure. The patient does not have any exotic pets, turtles or exotic birds.        Vaccines:    Immunization History   Administered Date(s) Administered    COVID-19, MODERNA BLUE border, Primary or Immunocompromised, (age 12y+), IM, 100 mcg/0.5mL 02/27/2021, 03/25/2021, 04/11/2022    COVID-19, MODERNA Bivalent, (age 12y+), IM, 50 mcg/0.5 mL 04/25/2023    INFLUENZA, INTRADERMAL,

## 2023-08-12 LAB
ANION GAP SERPL CALCULATED.3IONS-SCNC: 11 MMOL/L (ref 7–16)
BUN SERPL-MCNC: 39 MG/DL (ref 6–23)
CALCIUM SERPL-MCNC: 8.6 MG/DL (ref 8.6–10.2)
CHLORIDE SERPL-SCNC: 103 MMOL/L (ref 98–107)
CHLORIDE UR-SCNC: >140 MMOL/L
CO2 SERPL-SCNC: 30 MMOL/L (ref 22–29)
CREAT SERPL-MCNC: 1.8 MG/DL (ref 0.5–1)
CREAT UR-MCNC: 168.2 MG/DL (ref 29–226)
ERYTHROCYTE [DISTWIDTH] IN BLOOD BY AUTOMATED COUNT: 16.2 % (ref 11.5–15)
GFR SERPL CREATININE-BSD FRML MDRD: 30 ML/MIN/1.73M2
GLUCOSE SERPL-MCNC: 98 MG/DL (ref 74–99)
HCT VFR BLD AUTO: 29.9 % (ref 34–48)
HGB BLD-MCNC: 8.7 G/DL (ref 11.5–15.5)
L PNEUMO1 AG UR QL IA.RAPID: NEGATIVE
MAGNESIUM SERPL-MCNC: 2.1 MG/DL (ref 1.6–2.6)
MCH RBC QN AUTO: 25.3 PG (ref 26–35)
MCHC RBC AUTO-ENTMCNC: 29.1 G/DL (ref 32–34.5)
MCV RBC AUTO: 86.9 FL (ref 80–99.9)
OSMOLALITY UR: 506 MOSM/KG (ref 300–900)
PHOSPHATE SERPL-MCNC: 4.2 MG/DL (ref 2.5–4.5)
PLATELET # BLD AUTO: 141 K/UL (ref 130–450)
PMV BLD AUTO: 12.3 FL (ref 7–12)
POTASSIUM SERPL-SCNC: 3.9 MMOL/L (ref 3.5–5)
RBC # BLD AUTO: 3.44 M/UL (ref 3.5–5.5)
S PNEUM AG SPEC QL: NEGATIVE
SODIUM SERPL-SCNC: 144 MMOL/L (ref 132–146)
SODIUM UR-SCNC: <20 MMOL/L
SPECIMEN SOURCE: NORMAL
TOTAL PROTEIN, URINE: 19 MG/DL (ref 0–12)
WBC OTHER # BLD: 7.7 K/UL (ref 4.5–11.5)

## 2023-08-12 PROCEDURE — 36415 COLL VENOUS BLD VENIPUNCTURE: CPT

## 2023-08-12 PROCEDURE — 6370000000 HC RX 637 (ALT 250 FOR IP): Performed by: INTERNAL MEDICINE

## 2023-08-12 PROCEDURE — 82436 ASSAY OF URINE CHLORIDE: CPT

## 2023-08-12 PROCEDURE — 94640 AIRWAY INHALATION TREATMENT: CPT

## 2023-08-12 PROCEDURE — 87899 AGENT NOS ASSAY W/OPTIC: CPT

## 2023-08-12 PROCEDURE — 2580000003 HC RX 258: Performed by: INTERNAL MEDICINE

## 2023-08-12 PROCEDURE — 83735 ASSAY OF MAGNESIUM: CPT

## 2023-08-12 PROCEDURE — 1200000000 HC SEMI PRIVATE

## 2023-08-12 PROCEDURE — 84156 ASSAY OF PROTEIN URINE: CPT

## 2023-08-12 PROCEDURE — 83935 ASSAY OF URINE OSMOLALITY: CPT

## 2023-08-12 PROCEDURE — 80048 BASIC METABOLIC PNL TOTAL CA: CPT

## 2023-08-12 PROCEDURE — 87449 NOS EACH ORGANISM AG IA: CPT

## 2023-08-12 PROCEDURE — 2580000003 HC RX 258: Performed by: FAMILY MEDICINE

## 2023-08-12 PROCEDURE — 94660 CPAP INITIATION&MGMT: CPT

## 2023-08-12 PROCEDURE — 85027 COMPLETE CBC AUTOMATED: CPT

## 2023-08-12 PROCEDURE — 84100 ASSAY OF PHOSPHORUS: CPT

## 2023-08-12 PROCEDURE — 6370000000 HC RX 637 (ALT 250 FOR IP): Performed by: FAMILY MEDICINE

## 2023-08-12 PROCEDURE — 2700000000 HC OXYGEN THERAPY PER DAY

## 2023-08-12 PROCEDURE — 84300 ASSAY OF URINE SODIUM: CPT

## 2023-08-12 PROCEDURE — 6360000002 HC RX W HCPCS: Performed by: INTERNAL MEDICINE

## 2023-08-12 PROCEDURE — 82570 ASSAY OF URINE CREATININE: CPT

## 2023-08-12 PROCEDURE — 6370000000 HC RX 637 (ALT 250 FOR IP)

## 2023-08-12 PROCEDURE — 6360000002 HC RX W HCPCS: Performed by: FAMILY MEDICINE

## 2023-08-12 RX ADMIN — MEROPENEM 1000 MG: 1 INJECTION, POWDER, FOR SOLUTION INTRAVENOUS at 15:45

## 2023-08-12 RX ADMIN — POTASSIUM CHLORIDE 20 MEQ: 1500 TABLET, EXTENDED RELEASE ORAL at 09:07

## 2023-08-12 RX ADMIN — APIXABAN 5 MG: 5 TABLET, FILM COATED ORAL at 09:08

## 2023-08-12 RX ADMIN — BUDESONIDE 250 MCG: 0.25 SUSPENSION RESPIRATORY (INHALATION) at 07:57

## 2023-08-12 RX ADMIN — PREDNISONE 5 MG: 5 TABLET ORAL at 09:07

## 2023-08-12 RX ADMIN — HYDRALAZINE HYDROCHLORIDE 25 MG: 25 TABLET, FILM COATED ORAL at 21:06

## 2023-08-12 RX ADMIN — IPRATROPIUM BROMIDE AND ALBUTEROL SULFATE 1 DOSE: .5; 2.5 SOLUTION RESPIRATORY (INHALATION) at 07:57

## 2023-08-12 RX ADMIN — LEVETIRACETAM 500 MG: 500 TABLET, FILM COATED ORAL at 09:07

## 2023-08-12 RX ADMIN — MEROPENEM 1000 MG: 1 INJECTION, POWDER, FOR SOLUTION INTRAVENOUS at 01:44

## 2023-08-12 RX ADMIN — DULOXETINE HYDROCHLORIDE 60 MG: 60 CAPSULE, DELAYED RELEASE ORAL at 09:07

## 2023-08-12 RX ADMIN — OXYCODONE HYDROCHLORIDE 5 MG: 5 TABLET ORAL at 21:05

## 2023-08-12 RX ADMIN — OXYCODONE HYDROCHLORIDE 5 MG: 5 TABLET ORAL at 09:06

## 2023-08-12 RX ADMIN — DESMOPRESSIN ACETATE 200 MCG: 0.1 TABLET ORAL at 09:06

## 2023-08-12 RX ADMIN — IPRATROPIUM BROMIDE AND ALBUTEROL SULFATE 1 DOSE: .5; 2.5 SOLUTION RESPIRATORY (INHALATION) at 20:55

## 2023-08-12 RX ADMIN — FAMOTIDINE 20 MG: 20 TABLET, FILM COATED ORAL at 09:08

## 2023-08-12 RX ADMIN — HYDRALAZINE HYDROCHLORIDE 25 MG: 25 TABLET, FILM COATED ORAL at 09:08

## 2023-08-12 RX ADMIN — METOPROLOL SUCCINATE 50 MG: 50 TABLET, EXTENDED RELEASE ORAL at 21:06

## 2023-08-12 RX ADMIN — LEVETIRACETAM 500 MG: 500 TABLET, FILM COATED ORAL at 21:06

## 2023-08-12 RX ADMIN — FERROUS SULFATE TAB 325 MG (65 MG ELEMENTAL FE) 325 MG: 325 (65 FE) TAB at 16:43

## 2023-08-12 RX ADMIN — GUAIFENESIN SYRUP AND DEXTROMETHORPHAN 5 ML: 100; 10 SYRUP ORAL at 21:05

## 2023-08-12 RX ADMIN — LEVOTHYROXINE SODIUM 75 MCG: 0.07 TABLET ORAL at 09:12

## 2023-08-12 RX ADMIN — DOCUSATE SODIUM 100 MG: 100 CAPSULE, LIQUID FILLED ORAL at 21:06

## 2023-08-12 RX ADMIN — OXYCODONE HYDROCHLORIDE 5 MG: 5 TABLET ORAL at 16:43

## 2023-08-12 RX ADMIN — AMIODARONE HYDROCHLORIDE 200 MG: 200 TABLET ORAL at 09:07

## 2023-08-12 RX ADMIN — DESMOPRESSIN ACETATE 200 MCG: 0.1 TABLET ORAL at 21:06

## 2023-08-12 RX ADMIN — METOPROLOL SUCCINATE 50 MG: 50 TABLET, EXTENDED RELEASE ORAL at 09:07

## 2023-08-12 RX ADMIN — IPRATROPIUM BROMIDE AND ALBUTEROL SULFATE 1 DOSE: .5; 2.5 SOLUTION RESPIRATORY (INHALATION) at 15:38

## 2023-08-12 RX ADMIN — ARFORMOTEROL TARTRATE 15 MCG: 15 SOLUTION RESPIRATORY (INHALATION) at 07:57

## 2023-08-12 RX ADMIN — Medication 10 ML: at 09:08

## 2023-08-12 RX ADMIN — ARFORMOTEROL TARTRATE 15 MCG: 15 SOLUTION RESPIRATORY (INHALATION) at 20:55

## 2023-08-12 RX ADMIN — BUDESONIDE 250 MCG: 0.25 SUSPENSION RESPIRATORY (INHALATION) at 20:55

## 2023-08-12 RX ADMIN — APIXABAN 5 MG: 5 TABLET, FILM COATED ORAL at 21:06

## 2023-08-12 RX ADMIN — DOCUSATE SODIUM 100 MG: 100 CAPSULE, LIQUID FILLED ORAL at 09:07

## 2023-08-12 RX ADMIN — FERROUS SULFATE TAB 325 MG (65 MG ELEMENTAL FE) 325 MG: 325 (65 FE) TAB at 09:08

## 2023-08-12 ASSESSMENT — PAIN DESCRIPTION - DESCRIPTORS
DESCRIPTORS: ACHING;CRAMPING;DISCOMFORT
DESCRIPTORS: ACHING;THROBBING
DESCRIPTORS: ACHING;DISCOMFORT;CRAMPING

## 2023-08-12 ASSESSMENT — PAIN SCALES - GENERAL
PAINLEVEL_OUTOF10: 8
PAINLEVEL_OUTOF10: 9
PAINLEVEL_OUTOF10: 8

## 2023-08-12 ASSESSMENT — PAIN DESCRIPTION - ORIENTATION
ORIENTATION: LEFT;RIGHT
ORIENTATION: LEFT
ORIENTATION: LEFT;RIGHT

## 2023-08-12 ASSESSMENT — PAIN DESCRIPTION - LOCATION
LOCATION: SHOULDER;LEG
LOCATION: ARM
LOCATION: ARM

## 2023-08-12 NOTE — PROGRESS NOTES
08/11/23 2124   NIV Type   NIV Started/Stopped On   Equipment Type v60   Mode AVAPS   Mask Type Full face mask   Mask Size Small   Assessment   SpO2 94 %   Level of Consciousness 0   Comfort Level Good   Using Accessory Muscles No   Mask Compliance Good   Skin Assessment Clean, dry, & intact   Skin Protection for O2 Device Yes   Orientation Middle   Location Nose   Intervention(s) Skin Barrier   Settings/Measurements   CPAP/EPAP 10 cmH2O   IPAP Min 15 cmH2O   IPAP Max 25 cmH2O   Vt (Set, mL) 450 mL   Vt (Measured) 311 mL   Rate Ordered 20   FiO2  60 %   Minute Volume (L/min) 5.8 Liters   Mask Leak (lpm) 24 lpm   Patient's Home Machine No   Alarm Settings   Alarms On Y   Low Pressure (cmH2O) 8 cmH2O   High Pressure (cmH2O) 30 cmH2O   RR Low (bpm) 8   RR High (bpm) 45 br/min     Date: 8/11/2023    Time: 9:26 PM    Patient Placed On BIPAP/CPAP/ Non-Invasive Ventilation? Yes    If no must comment. Facial area red/color change? No           If YES are Blister/Lesion present? No   If yes must notify nursing staff  BIPAP/CPAP skin barrier?   Yes    Skin barrier type:mepilexlite       Comments:        Dulce Ibarra RCP

## 2023-08-12 NOTE — CONSULTS
[facility] MD Joaquim Schmitz, MD Adam Shah, ANDREAS Mendoza, MOLLY  Consultation  Patient's Name: Shilpa Canales  9:06 PM  8/11/2023    Nephrologist: Joaquim Brooks MD    Reason for Consult: IRMA  Requesting Physician:  Marietta Delaney MD    Chief Complaint: \"RC breathing\"    History Obtained From: Patient, chart    History of Present Ilness:    Ms. Kristan Forrest is a 79-year-old woman with chronic respiratory insufficiency, baseline wears 6 L per nasal cannula. Admitted through the emergency department, for dyspnea. She became dyspneic at home, though she tells me this was told to her by her family, who found her unresponsive. And notes that her oxygen machine was not functioning properly, though in fact sounds like she simply ran out of oxygen and another oxygen tank could not be delivered until the following day. EMS evaluated her and she was noted to have SaO2 82%, she was brought to the emergency department. While on oxygen in the ambulance SaO2 was 96%. Serum creatinine presentation was 1.2 mg/dL 8/9, improving to 1.0 as of yesterday 8/10, though increasing to 2.0 mg/dL as of this morning. Blood pressure yesterday morning was 156/90 mmHg, though by 8 PM last evening was 92/61 mmHg. As of this evening BP was 94/66. Heart rates are in the 60s and 70s. No contrast was administered. No antibiotics. No known nephrotoxins. No NSAIDs. She will that she periodically has chest pain, in fact has had persistent substernal chest pain, \"like someone punched me in the chest,\" the past 3 to 4 days, waxing and waning but for the most part no different. Denies heartburn. No epigastric pain per se. No cough wheeze or sputum production. No peripheral swelling. Ongoing fatigue malaise and generalized weakness, though she notes this is no different than her typical baseline.     Past Medical History:   Diagnosis Date    A-fib [facility])     follows bilaterally at bases, generally poor inspiratory effort unable to encourage better. No wheeze.   Irregularly irregular rhythm normal S1 and S2  Abdomen soft nontender nondistended normal active bowel sounds no mass no paraspinal leg  Distal extremities reveals mild chronic-tight distal lower extremity edema  Pulses 1+ x4  No rash or lesion on visible portions of integument  Moves all 4 extremity  Cranial nerves II through XII grossly intact  Awake, alert and interactive and appropriate    Data:   Labs:  CBC with Differential:    Lab Results   Component Value Date/Time    WBC 10.9 08/11/2023 08:20 AM    RBC 3.41 08/11/2023 08:20 AM    RBC 3.57 12/14/2021 09:57 AM    HGB 8.6 08/11/2023 08:20 AM    HCT 29.4 08/11/2023 08:20 AM    HCT 41.0 04/28/2023 02:54 PM     08/09/2023 10:57 PM    MCV 86.2 08/11/2023 08:20 AM    MCH 25.2 08/11/2023 08:20 AM    MCHC 29.3 08/11/2023 08:20 AM    RDW 16.3 08/11/2023 08:20 AM    NRBC 0.9 07/14/2023 04:11 AM    SEGSPCT 77 01/15/2014 04:00 PM    BANDSPCT 1 06/25/2014 05:45 AM    METASPCT 0.9 09/21/2022 07:23 AM    LYMPHOPCT 2 08/10/2023 06:04 AM    LYMPHOPCT 14.5 12/14/2021 09:57 AM    PROMYELOPCT 0.9 09/10/2022 01:23 PM    MONOPCT 3 08/10/2023 06:04 AM    MYELOPCT 1 07/15/2023 04:45 AM    MYELOPCT 0.9 05/31/2023 04:59 AM    BASOPCT 0 08/10/2023 06:04 AM    MONOSABS 0.30 08/10/2023 06:04 AM    LYMPHSABS 0.20 08/10/2023 06:04 AM    EOSABS 0.30 08/10/2023 06:04 AM    BASOSABS 0 08/10/2023 06:04 AM     CMP:    Lab Results   Component Value Date/Time     08/11/2023 08:20 AM    K 3.9 08/11/2023 08:20 AM    K 3.8 07/14/2023 04:12 AM    CL 99 08/11/2023 08:20 AM    CO2 33 08/11/2023 08:20 AM    BUN 35 08/11/2023 08:20 AM    CREATININE 2.0 08/11/2023 08:20 AM    GFRAA >60 10/04/2022 06:02 AM    LABGLOM 27 08/11/2023 08:20 AM    GLUCOSE 83 08/11/2023 08:20 AM    GLUCOSE 116 05/02/2012 07:40 PM    PROT 7.2 08/10/2023 06:04 AM    LABALBU 3.7 08/10/2023 06:04 AM    LABALBU 3.6 05/02/2012

## 2023-08-13 LAB
ANION GAP SERPL CALCULATED.3IONS-SCNC: 7 MMOL/L (ref 7–16)
BUN SERPL-MCNC: 26 MG/DL (ref 6–23)
CALCIUM SERPL-MCNC: 8.4 MG/DL (ref 8.6–10.2)
CHLORIDE SERPL-SCNC: 109 MMOL/L (ref 98–107)
CO2 SERPL-SCNC: 31 MMOL/L (ref 22–29)
CREAT SERPL-MCNC: 1.1 MG/DL (ref 0.5–1)
ERYTHROCYTE [DISTWIDTH] IN BLOOD BY AUTOMATED COUNT: 15.9 % (ref 11.5–15)
GFR SERPL CREATININE-BSD FRML MDRD: 54 ML/MIN/1.73M2
GLUCOSE SERPL-MCNC: 78 MG/DL (ref 74–99)
HCT VFR BLD AUTO: 26.1 % (ref 34–48)
HGB BLD-MCNC: 7.7 G/DL (ref 11.5–15.5)
MCH RBC QN AUTO: 25.2 PG (ref 26–35)
MCHC RBC AUTO-ENTMCNC: 29.5 G/DL (ref 32–34.5)
MCV RBC AUTO: 85.6 FL (ref 80–99.9)
MICROORGANISM SPEC CULT: ABNORMAL
MICROORGANISM SPEC CULT: ABNORMAL
MICROORGANISM SPEC CULT: NORMAL
MICROORGANISM/AGENT SPEC: ABNORMAL
PLATELET, FLUORESCENCE: 119 K/UL (ref 130–450)
PMV BLD AUTO: 11.5 FL (ref 7–12)
POTASSIUM SERPL-SCNC: 4.3 MMOL/L (ref 3.5–5)
RBC # BLD AUTO: 3.05 M/UL (ref 3.5–5.5)
SODIUM SERPL-SCNC: 147 MMOL/L (ref 132–146)
SPECIMEN DESCRIPTION: ABNORMAL
SPECIMEN DESCRIPTION: NORMAL
WBC OTHER # BLD: 5.9 K/UL (ref 4.5–11.5)

## 2023-08-13 PROCEDURE — 2580000003 HC RX 258: Performed by: FAMILY MEDICINE

## 2023-08-13 PROCEDURE — 6370000000 HC RX 637 (ALT 250 FOR IP): Performed by: INTERNAL MEDICINE

## 2023-08-13 PROCEDURE — 6360000002 HC RX W HCPCS: Performed by: INTERNAL MEDICINE

## 2023-08-13 PROCEDURE — 6370000000 HC RX 637 (ALT 250 FOR IP)

## 2023-08-13 PROCEDURE — 2580000003 HC RX 258: Performed by: INTERNAL MEDICINE

## 2023-08-13 PROCEDURE — 80048 BASIC METABOLIC PNL TOTAL CA: CPT

## 2023-08-13 PROCEDURE — 94660 CPAP INITIATION&MGMT: CPT

## 2023-08-13 PROCEDURE — 6370000000 HC RX 637 (ALT 250 FOR IP): Performed by: FAMILY MEDICINE

## 2023-08-13 PROCEDURE — 6360000002 HC RX W HCPCS: Performed by: FAMILY MEDICINE

## 2023-08-13 PROCEDURE — 94640 AIRWAY INHALATION TREATMENT: CPT

## 2023-08-13 PROCEDURE — 1200000000 HC SEMI PRIVATE

## 2023-08-13 PROCEDURE — 2700000000 HC OXYGEN THERAPY PER DAY

## 2023-08-13 PROCEDURE — 85027 COMPLETE CBC AUTOMATED: CPT

## 2023-08-13 PROCEDURE — 36415 COLL VENOUS BLD VENIPUNCTURE: CPT

## 2023-08-13 RX ORDER — DEXTROSE AND SODIUM CHLORIDE 5; .45 G/100ML; G/100ML
INJECTION, SOLUTION INTRAVENOUS CONTINUOUS
Status: DISCONTINUED | OUTPATIENT
Start: 2023-08-13 | End: 2023-08-15

## 2023-08-13 RX ADMIN — Medication 10 ML: at 09:27

## 2023-08-13 RX ADMIN — METOPROLOL SUCCINATE 50 MG: 50 TABLET, EXTENDED RELEASE ORAL at 09:26

## 2023-08-13 RX ADMIN — OXYCODONE HYDROCHLORIDE 5 MG: 5 TABLET ORAL at 05:57

## 2023-08-13 RX ADMIN — MEROPENEM 1000 MG: 1 INJECTION, POWDER, FOR SOLUTION INTRAVENOUS at 15:54

## 2023-08-13 RX ADMIN — LEVETIRACETAM 500 MG: 500 TABLET, FILM COATED ORAL at 09:26

## 2023-08-13 RX ADMIN — FERROUS SULFATE TAB 325 MG (65 MG ELEMENTAL FE) 325 MG: 325 (65 FE) TAB at 15:50

## 2023-08-13 RX ADMIN — ARFORMOTEROL TARTRATE 15 MCG: 15 SOLUTION RESPIRATORY (INHALATION) at 20:04

## 2023-08-13 RX ADMIN — IPRATROPIUM BROMIDE AND ALBUTEROL SULFATE 1 DOSE: .5; 2.5 SOLUTION RESPIRATORY (INHALATION) at 08:41

## 2023-08-13 RX ADMIN — DOCUSATE SODIUM 100 MG: 100 CAPSULE, LIQUID FILLED ORAL at 09:25

## 2023-08-13 RX ADMIN — ARFORMOTEROL TARTRATE 15 MCG: 15 SOLUTION RESPIRATORY (INHALATION) at 08:41

## 2023-08-13 RX ADMIN — DESMOPRESSIN ACETATE 200 MCG: 0.1 TABLET ORAL at 21:16

## 2023-08-13 RX ADMIN — GUAIFENESIN SYRUP AND DEXTROMETHORPHAN 5 ML: 100; 10 SYRUP ORAL at 15:51

## 2023-08-13 RX ADMIN — HYDRALAZINE HYDROCHLORIDE 25 MG: 25 TABLET, FILM COATED ORAL at 21:16

## 2023-08-13 RX ADMIN — METOPROLOL SUCCINATE 50 MG: 50 TABLET, EXTENDED RELEASE ORAL at 21:16

## 2023-08-13 RX ADMIN — APIXABAN 5 MG: 5 TABLET, FILM COATED ORAL at 09:26

## 2023-08-13 RX ADMIN — HYDRALAZINE HYDROCHLORIDE 25 MG: 25 TABLET, FILM COATED ORAL at 09:26

## 2023-08-13 RX ADMIN — MEROPENEM 1000 MG: 1 INJECTION, POWDER, FOR SOLUTION INTRAVENOUS at 02:24

## 2023-08-13 RX ADMIN — LEVOTHYROXINE SODIUM 75 MCG: 0.07 TABLET ORAL at 05:52

## 2023-08-13 RX ADMIN — DULOXETINE HYDROCHLORIDE 60 MG: 60 CAPSULE, DELAYED RELEASE ORAL at 09:26

## 2023-08-13 RX ADMIN — DEXTROSE AND SODIUM CHLORIDE: 5; 450 INJECTION, SOLUTION INTRAVENOUS at 11:38

## 2023-08-13 RX ADMIN — APIXABAN 5 MG: 5 TABLET, FILM COATED ORAL at 21:16

## 2023-08-13 RX ADMIN — BUDESONIDE 250 MCG: 0.25 SUSPENSION RESPIRATORY (INHALATION) at 08:41

## 2023-08-13 RX ADMIN — OXYCODONE HYDROCHLORIDE 5 MG: 5 TABLET ORAL at 21:16

## 2023-08-13 RX ADMIN — IPRATROPIUM BROMIDE AND ALBUTEROL SULFATE 1 DOSE: .5; 2.5 SOLUTION RESPIRATORY (INHALATION) at 11:58

## 2023-08-13 RX ADMIN — OXYCODONE HYDROCHLORIDE 5 MG: 5 TABLET ORAL at 10:09

## 2023-08-13 RX ADMIN — POTASSIUM CHLORIDE 20 MEQ: 1500 TABLET, EXTENDED RELEASE ORAL at 09:26

## 2023-08-13 RX ADMIN — DESMOPRESSIN ACETATE 200 MCG: 0.1 TABLET ORAL at 09:26

## 2023-08-13 RX ADMIN — FAMOTIDINE 20 MG: 20 TABLET, FILM COATED ORAL at 09:26

## 2023-08-13 RX ADMIN — AMIODARONE HYDROCHLORIDE 200 MG: 200 TABLET ORAL at 09:26

## 2023-08-13 RX ADMIN — LEVETIRACETAM 500 MG: 500 TABLET, FILM COATED ORAL at 21:16

## 2023-08-13 RX ADMIN — IPRATROPIUM BROMIDE AND ALBUTEROL SULFATE 1 DOSE: .5; 2.5 SOLUTION RESPIRATORY (INHALATION) at 20:04

## 2023-08-13 RX ADMIN — FERROUS SULFATE TAB 325 MG (65 MG ELEMENTAL FE) 325 MG: 325 (65 FE) TAB at 09:26

## 2023-08-13 RX ADMIN — PREDNISONE 5 MG: 5 TABLET ORAL at 09:26

## 2023-08-13 RX ADMIN — IPRATROPIUM BROMIDE AND ALBUTEROL SULFATE 1 DOSE: .5; 2.5 SOLUTION RESPIRATORY (INHALATION) at 16:02

## 2023-08-13 RX ADMIN — BUDESONIDE 250 MCG: 0.25 SUSPENSION RESPIRATORY (INHALATION) at 20:04

## 2023-08-13 ASSESSMENT — PAIN - FUNCTIONAL ASSESSMENT: PAIN_FUNCTIONAL_ASSESSMENT: ACTIVITIES ARE NOT PREVENTED

## 2023-08-13 ASSESSMENT — PAIN DESCRIPTION - ORIENTATION
ORIENTATION: RIGHT;LEFT
ORIENTATION: RIGHT;LEFT

## 2023-08-13 ASSESSMENT — PAIN SCALES - GENERAL
PAINLEVEL_OUTOF10: 8
PAINLEVEL_OUTOF10: 9
PAINLEVEL_OUTOF10: 10

## 2023-08-13 ASSESSMENT — PAIN DESCRIPTION - DESCRIPTORS
DESCRIPTORS: ACHING;CRUSHING;DISCOMFORT
DESCRIPTORS: ACHING;SORE;DISCOMFORT

## 2023-08-13 ASSESSMENT — PAIN DESCRIPTION - LOCATION
LOCATION: ARM
LOCATION: SHOULDER;LEG

## 2023-08-13 NOTE — PROGRESS NOTES
08/12/23 2120   NIV Type   NIV Started/Stopped On   Equipment Type v60   Mode AVAPS   Mask Type Full face mask   Mask Size Small   Assessment   Level of Consciousness 0   Comfort Level Good   Using Accessory Muscles No   Mask Compliance Good   Skin Assessment Clean, dry, & intact   Skin Protection for O2 Device Yes   Orientation Middle   Location Tongue   Settings/Measurements   PIP Observed 19 cm H20   CPAP/EPAP 10 cmH2O   IPAP Min 15 cmH2O   IPAP Max 25 cmH2O   Vt (Set, mL) 450 mL   Vt (Measured) 220 mL   Rate Ordered 20   Insp Rise Time (%) 3 %   FiO2  60 %   I Time/ I Time % 0.9 s   Minute Volume (L/min) 4.7 Liters   Mask Leak (lpm) 25 lpm     Date: 8/12/2023    Time: 10:44 PM    Patient Placed On BIPAP/CPAP/ Non-Invasive Ventilation? Yes    If no must comment. Facial area red/color change? No           If YES are Blister/Lesion present? No   If yes must notify nursing staff  BIPAP/CPAP skin barrier? Yes    Skin barrier type:mepilexlite       Comments:  Mepilex placed on nose. Patient placed on BiPAP.  No issues      Luke Roads, [facility]

## 2023-08-13 NOTE — PROGRESS NOTES
[facility] MD Henny Jensen, MD Jose J Mead, MD Tami Jordan, CNP   Rita Henley, MOLLY Rodarte, ANDREAS  Progress Note    8/13/2023    SUBJECTIVE:   8/12: Feeling better. Appetite intake improved somewhat. Tolerating IV fluid. (-) c/o's  (-) sob/orosco/cp/palp ongoing fatigue and generalized weakness otherwise no complaint    8/13: Feeling much better. Oral intake has improved, though still not particularly good. ROS otherwise unremarkable besides ongoing fatigue malaise generalized weakness, slightly bedbound as she has not attempted to get out of the bed yet today    PROBLEM LIST:    Principal Problem:    Acute on chronic respiratory failure ([address])  Resolved Problems:    * No resolved hospital problems. *         DIET:    ADULT DIET;  Regular     MEDS (scheduled):    famotidine  20 mg Oral Daily    ipratropium 0.5 mg-albuterol 2.5 mg  1 Dose Inhalation Q4H WA RT    meropenem  1,000 mg IntraVENous Q12H    predniSONE  5 mg Oral Daily    amiodarone  200 mg Oral Daily    [Held by provider] amLODIPine  10 mg Oral Daily    desmopressin  200 mcg Oral BID    docusate sodium  100 mg Oral BID    DULoxetine  60 mg Oral Daily    apixaban  5 mg Oral BID    ferrous sulfate  325 mg Oral BID WC    [Held by provider] furosemide  40 mg Oral Daily    hydrALAZINE  25 mg Oral BID    levETIRAcetam  500 mg Oral BID    levothyroxine  75 mcg Oral QAM AC    metoprolol succinate  50 mg Oral BID    potassium chloride  20 mEq Oral Daily    sodium chloride flush  10 mL IntraVENous 2 times per day    budesonide  0.25 mg Nebulization BID RT    And    arformoterol tartrate  15 mcg Nebulization BID RT       MEDS (infusions):   dextrose 5 % and 0.45 % NaCl 65 mL/hr at 08/13/23 1138    sodium chloride         MEDS (prn):  albuterol, guaiFENesin-dextromethorphan, oxyCODONE, sodium chloride flush, sodium chloride, promethazine **OR** ondansetron, polyethylene glycol, acetaminophen **OR** LABPROT 0.2 04/21/2023       Assessment  Acute kidney injury, presumptively due to the development of relative hypotension over the course of yesterday. Might speculate that the transient hypoxemia is also contributing. Atrial fibrillation may be contributing as well, though believe this is chronic. No NSAIDs. No known nephrotoxins. Renal ultrasound was indicative of chronic medical renal disease. Urine studies ordered, not yet collected  Anemia. Normocytic. Denies melena medic easier.   CKD may be playing a role, though notably her baseline creatinine is 1 mg/dL, so I doubt it  Hypernatremia, mild, associated hyperchloremia, likely due to NS drip, poor oral intake     Azotemia improving  Urine output is picked up  Medically improving    Recommendations    Continue IV fluid --changed to D5 1/2 NS, lower the rate  Encourage oral intake  If oral intake improves, stop IV fluid tomorrow  Follow labs, UO  Continue supportive care  Check iron profile and ferritin, supplement as warranted      Electronically signed by Olayinka Mack MD on 8/13/2023

## 2023-08-14 LAB
ALBUMIN SERPL-MCNC: 2.8 G/DL (ref 3.5–5.2)
ALP SERPL-CCNC: 65 U/L (ref 35–104)
ALT SERPL-CCNC: 9 U/L (ref 0–32)
ANION GAP SERPL CALCULATED.3IONS-SCNC: 10 MMOL/L (ref 7–16)
AST SERPL-CCNC: 17 U/L (ref 0–31)
BILIRUB SERPL-MCNC: 0.3 MG/DL (ref 0–1.2)
BUN SERPL-MCNC: 13 MG/DL (ref 6–23)
CALCIUM SERPL-MCNC: 8.8 MG/DL (ref 8.6–10.2)
CHLORIDE SERPL-SCNC: 106 MMOL/L (ref 98–107)
CO2 SERPL-SCNC: 27 MMOL/L (ref 22–29)
CREAT SERPL-MCNC: 0.8 MG/DL (ref 0.5–1)
GFR SERPL CREATININE-BSD FRML MDRD: >60 ML/MIN/1.73M2
GLUCOSE BLD-MCNC: 83 MG/DL (ref 74–99)
GLUCOSE SERPL-MCNC: 55 MG/DL (ref 74–99)
MAGNESIUM SERPL-MCNC: 2 MG/DL (ref 1.6–2.6)
PHOSPHATE SERPL-MCNC: 1.5 MG/DL (ref 2.5–4.5)
POTASSIUM SERPL-SCNC: 4.7 MMOL/L (ref 3.5–5)
PROT SERPL-MCNC: 6.2 G/DL (ref 6.4–8.3)
SODIUM SERPL-SCNC: 143 MMOL/L (ref 132–146)

## 2023-08-14 PROCEDURE — 84100 ASSAY OF PHOSPHORUS: CPT

## 2023-08-14 PROCEDURE — 94640 AIRWAY INHALATION TREATMENT: CPT

## 2023-08-14 PROCEDURE — 94660 CPAP INITIATION&MGMT: CPT

## 2023-08-14 PROCEDURE — 2580000003 HC RX 258: Performed by: INTERNAL MEDICINE

## 2023-08-14 PROCEDURE — 82962 GLUCOSE BLOOD TEST: CPT

## 2023-08-14 PROCEDURE — 2500000003 HC RX 250 WO HCPCS: Performed by: NURSE PRACTITIONER

## 2023-08-14 PROCEDURE — 6370000000 HC RX 637 (ALT 250 FOR IP): Performed by: INTERNAL MEDICINE

## 2023-08-14 PROCEDURE — 6370000000 HC RX 637 (ALT 250 FOR IP): Performed by: FAMILY MEDICINE

## 2023-08-14 PROCEDURE — 2700000000 HC OXYGEN THERAPY PER DAY

## 2023-08-14 PROCEDURE — 6370000000 HC RX 637 (ALT 250 FOR IP): Performed by: NURSE PRACTITIONER

## 2023-08-14 PROCEDURE — 6370000000 HC RX 637 (ALT 250 FOR IP)

## 2023-08-14 PROCEDURE — 97530 THERAPEUTIC ACTIVITIES: CPT

## 2023-08-14 PROCEDURE — 2580000003 HC RX 258: Performed by: FAMILY MEDICINE

## 2023-08-14 PROCEDURE — 6360000002 HC RX W HCPCS: Performed by: INTERNAL MEDICINE

## 2023-08-14 PROCEDURE — 97535 SELF CARE MNGMENT TRAINING: CPT

## 2023-08-14 PROCEDURE — 83735 ASSAY OF MAGNESIUM: CPT

## 2023-08-14 PROCEDURE — 1200000000 HC SEMI PRIVATE

## 2023-08-14 PROCEDURE — 97161 PT EVAL LOW COMPLEX 20 MIN: CPT

## 2023-08-14 PROCEDURE — 2580000003 HC RX 258: Performed by: NURSE PRACTITIONER

## 2023-08-14 PROCEDURE — 6360000002 HC RX W HCPCS: Performed by: FAMILY MEDICINE

## 2023-08-14 PROCEDURE — 36415 COLL VENOUS BLD VENIPUNCTURE: CPT

## 2023-08-14 PROCEDURE — 80053 COMPREHEN METABOLIC PANEL: CPT

## 2023-08-14 RX ORDER — AMLODIPINE BESYLATE 5 MG/1
5 TABLET ORAL DAILY
Status: DISCONTINUED | OUTPATIENT
Start: 2023-08-14 | End: 2023-08-14

## 2023-08-14 RX ORDER — SULFAMETHOXAZOLE AND TRIMETHOPRIM 800; 160 MG/1; MG/1
2 TABLET ORAL EVERY 8 HOURS SCHEDULED
Status: DISCONTINUED | OUTPATIENT
Start: 2023-08-14 | End: 2023-08-18 | Stop reason: HOSPADM

## 2023-08-14 RX ORDER — DEXTROSE MONOHYDRATE 100 MG/ML
INJECTION, SOLUTION INTRAVENOUS CONTINUOUS PRN
Status: DISCONTINUED | OUTPATIENT
Start: 2023-08-14 | End: 2023-08-18 | Stop reason: HOSPADM

## 2023-08-14 RX ORDER — SULFAMETHOXAZOLE AND TRIMETHOPRIM 800; 160 MG/1; MG/1
2 TABLET ORAL EVERY 8 HOURS SCHEDULED
Qty: 42 TABLET | Refills: 0 | Status: SHIPPED | OUTPATIENT
Start: 2023-08-14 | End: 2023-08-18 | Stop reason: SDUPTHER

## 2023-08-14 RX ADMIN — MEROPENEM 1000 MG: 1 INJECTION, POWDER, FOR SOLUTION INTRAVENOUS at 02:33

## 2023-08-14 RX ADMIN — IPRATROPIUM BROMIDE AND ALBUTEROL SULFATE 1 DOSE: .5; 2.5 SOLUTION RESPIRATORY (INHALATION) at 13:14

## 2023-08-14 RX ADMIN — HYDRALAZINE HYDROCHLORIDE 25 MG: 25 TABLET, FILM COATED ORAL at 20:31

## 2023-08-14 RX ADMIN — SODIUM PHOSPHATE, MONOBASIC, MONOHYDRATE AND SODIUM PHOSPHATE, DIBASIC, ANHYDROUS 15 MMOL: 276; 142 INJECTION, SOLUTION INTRAVENOUS at 14:36

## 2023-08-14 RX ADMIN — DESMOPRESSIN ACETATE 200 MCG: 0.1 TABLET ORAL at 20:31

## 2023-08-14 RX ADMIN — FAMOTIDINE 20 MG: 20 TABLET, FILM COATED ORAL at 09:23

## 2023-08-14 RX ADMIN — APIXABAN 5 MG: 5 TABLET, FILM COATED ORAL at 20:31

## 2023-08-14 RX ADMIN — SULFAMETHOXAZOLE AND TRIMETHOPRIM 2 TABLET: 800; 160 TABLET ORAL at 17:43

## 2023-08-14 RX ADMIN — DOCUSATE SODIUM 100 MG: 100 CAPSULE, LIQUID FILLED ORAL at 20:31

## 2023-08-14 RX ADMIN — ARFORMOTEROL TARTRATE 15 MCG: 15 SOLUTION RESPIRATORY (INHALATION) at 19:58

## 2023-08-14 RX ADMIN — AMLODIPINE BESYLATE 5 MG: 5 TABLET ORAL at 09:23

## 2023-08-14 RX ADMIN — METOPROLOL SUCCINATE 50 MG: 50 TABLET, EXTENDED RELEASE ORAL at 20:32

## 2023-08-14 RX ADMIN — DESMOPRESSIN ACETATE 200 MCG: 0.1 TABLET ORAL at 09:23

## 2023-08-14 RX ADMIN — SULFAMETHOXAZOLE AND TRIMETHOPRIM 2 TABLET: 800; 160 TABLET ORAL at 20:31

## 2023-08-14 RX ADMIN — APIXABAN 5 MG: 5 TABLET, FILM COATED ORAL at 09:23

## 2023-08-14 RX ADMIN — PREDNISONE 5 MG: 5 TABLET ORAL at 09:24

## 2023-08-14 RX ADMIN — AMIODARONE HYDROCHLORIDE 200 MG: 200 TABLET ORAL at 09:23

## 2023-08-14 RX ADMIN — HYDRALAZINE HYDROCHLORIDE 25 MG: 25 TABLET, FILM COATED ORAL at 09:24

## 2023-08-14 RX ADMIN — LEVOTHYROXINE SODIUM 75 MCG: 0.07 TABLET ORAL at 09:24

## 2023-08-14 RX ADMIN — IPRATROPIUM BROMIDE AND ALBUTEROL SULFATE 1 DOSE: .5; 2.5 SOLUTION RESPIRATORY (INHALATION) at 15:57

## 2023-08-14 RX ADMIN — Medication 10 ML: at 20:36

## 2023-08-14 RX ADMIN — IPRATROPIUM BROMIDE AND ALBUTEROL SULFATE 1 DOSE: .5; 2.5 SOLUTION RESPIRATORY (INHALATION) at 19:57

## 2023-08-14 RX ADMIN — DOCUSATE SODIUM 100 MG: 100 CAPSULE, LIQUID FILLED ORAL at 09:23

## 2023-08-14 RX ADMIN — LEVETIRACETAM 500 MG: 500 TABLET, FILM COATED ORAL at 20:32

## 2023-08-14 RX ADMIN — METOPROLOL SUCCINATE 50 MG: 50 TABLET, EXTENDED RELEASE ORAL at 09:23

## 2023-08-14 RX ADMIN — FERROUS SULFATE TAB 325 MG (65 MG ELEMENTAL FE) 325 MG: 325 (65 FE) TAB at 09:23

## 2023-08-14 RX ADMIN — GUAIFENESIN SYRUP AND DEXTROMETHORPHAN 5 ML: 100; 10 SYRUP ORAL at 10:06

## 2023-08-14 RX ADMIN — OXYCODONE HYDROCHLORIDE 5 MG: 5 TABLET ORAL at 19:11

## 2023-08-14 RX ADMIN — DULOXETINE HYDROCHLORIDE 60 MG: 60 CAPSULE, DELAYED RELEASE ORAL at 09:23

## 2023-08-14 RX ADMIN — BUDESONIDE 250 MCG: 0.25 SUSPENSION RESPIRATORY (INHALATION) at 19:58

## 2023-08-14 RX ADMIN — OXYCODONE HYDROCHLORIDE 5 MG: 5 TABLET ORAL at 10:06

## 2023-08-14 RX ADMIN — LEVETIRACETAM 500 MG: 500 TABLET, FILM COATED ORAL at 09:22

## 2023-08-14 RX ADMIN — FERROUS SULFATE TAB 325 MG (65 MG ELEMENTAL FE) 325 MG: 325 (65 FE) TAB at 16:31

## 2023-08-14 ASSESSMENT — PAIN DESCRIPTION - DESCRIPTORS
DESCRIPTORS: ACHING;DISCOMFORT;BURNING
DESCRIPTORS: ACHING;DISCOMFORT
DESCRIPTORS: ACHING;DISCOMFORT

## 2023-08-14 ASSESSMENT — PAIN DESCRIPTION - LOCATION
LOCATION: GENERALIZED

## 2023-08-14 ASSESSMENT — PAIN SCALES - GENERAL
PAINLEVEL_OUTOF10: 0
PAINLEVEL_OUTOF10: 9
PAINLEVEL_OUTOF10: 8
PAINLEVEL_OUTOF10: 9

## 2023-08-14 NOTE — DISCHARGE INSTRUCTIONS

## 2023-08-14 NOTE — PROGRESS NOTES
[facility] MD Joaquim Schmitz, MD Miguelito Brink, MD Adam Washington, CNP   Rita Henley, ANP  Cassandra Dailey, ANDREAS  Progress Note    8/14/2023    SUBJECTIVE:   8/12: Feeling better. Appetite intake improved somewhat. Tolerating IV fluid. (-) c/o's  (-) sob/orosco/cp/palp ongoing fatigue and generalized weakness otherwise no complaint    8/13: Feeling much better. Oral intake has improved, though still not particularly good. ROS otherwise unremarkable besides ongoing fatigue malaise generalized weakness, slightly bedbound as she has not attempted to get out of the bed yet today    PROBLEM LIST:    Principal Problem:    Acute on chronic respiratory failure ([address])  Resolved Problems:    * No resolved hospital problems. *         DIET:    ADULT DIET;  Regular     MEDS (scheduled):    amLODIPine  5 mg Oral Daily    sulfamethoxazole-trimethoprim  2 tablet Oral 3 times per day    famotidine  20 mg Oral Daily    ipratropium 0.5 mg-albuterol 2.5 mg  1 Dose Inhalation Q4H WA RT    predniSONE  5 mg Oral Daily    amiodarone  200 mg Oral Daily    desmopressin  200 mcg Oral BID    docusate sodium  100 mg Oral BID    DULoxetine  60 mg Oral Daily    apixaban  5 mg Oral BID    ferrous sulfate  325 mg Oral BID WC    [Held by provider] furosemide  40 mg Oral Daily    hydrALAZINE  25 mg Oral BID    levETIRAcetam  500 mg Oral BID    levothyroxine  75 mcg Oral QAM AC    metoprolol succinate  50 mg Oral BID    [Held by provider] potassium chloride  20 mEq Oral Daily    sodium chloride flush  10 mL IntraVENous 2 times per day    budesonide  0.25 mg Nebulization BID RT    And    arformoterol tartrate  15 mcg Nebulization BID RT       MEDS (infusions):   dextrose      dextrose 5 % and 0.45 % NaCl 65 mL/hr at 08/13/23 1138    sodium chloride         MEDS (prn):  sodium phosphate IVPB **OR** sodium phosphate IVPB **OR** sodium phosphate IVPB, glucose, dextrose bolus **OR** dextrose bolus, glucagon 08/13/23  0513 08/14/23  0551    147* 143   K 3.9 4.3 4.7    109* 106   CO2 30* 31* 27   MG 2.1  --  2.0   PHOS 4.2  --  1.5*   BUN 39* 26* 13   CREATININE 1.8* 1.1* 0.8   ALT  --   --  9   AST  --   --  17   BILITOT  --   --  0.3   ALKPHOS  --   --  65         Lab Results   Component Value Date    LABPROT 0.2 06/04/2023    LABPROT 0.2 06/04/2023    LABPROT 0.2 04/21/2023    LABPROT 0.2 04/21/2023       Assessment  Acute kidney injury, presumptively due to the development of relative hypotension over the course of yesterday. Might speculate that the transient hypoxemia is also contributing. Atrial fibrillation may be contributing as well, though believe this is chronic. No NSAIDs. No known nephrotoxins. Renal ultrasound was indicative of chronic medical renal disease. Urine studies ordered, not yet collected  Anemia. Normocytic. Denies melena medic easier.   CKD may be playing a role, though notably her baseline creatinine is 1 mg/dL, so I doubt it  Hypernatremia, mild, associated hyperchloremia, likely due to NS drip, poor oral intake     Azotemia improving  Urine output is picked up  Medically improving    Recommendations    Continue IV fluid --changed to D5 1/2 NS, lower the rate  Encourage oral intake  If oral intake improves, stop IV fluid tomorrow  Follow labs, UO  Continue supportive care  Check iron profile and ferritin, supplement as warranted      Electronically signed by Irina Storey MD on 8/14/2023

## 2023-08-14 NOTE — PROGRESS NOTES
Date: 8/13/2023    Time: 10:12 PM    Patient Placed On BIPAP/CPAP/ Non-Invasive Ventilation? Yes    If no must comment. Facial area red/color change? No           If YES are Blister/Lesion present? No   If yes must notify nursing staff  BIPAP/CPAP skin barrier?   Yes    Skin barrier type:mepilexlite       Comments:        Jeffry Mae RCP

## 2023-08-14 NOTE — PROGRESS NOTES
tartrate  15 mcg Nebulization BID RT     PRN Meds: albuterol, guaiFENesin-dextromethorphan, oxyCODONE, sodium chloride flush, sodium chloride, promethazine **OR** ondansetron, polyethylene glycol, acetaminophen **OR** acetaminophen, polyvinyl alcohol    Labs:     Recent Labs     08/12/23 0603 08/13/23 0513   WBC 7.7 5.9   HGB 8.7* 7.7*   HCT 29.9* 26.1*     --        Recent Labs     08/12/23  0603 08/13/23 0513    147*   K 3.9 4.3    109*   CO2 30* 31*   BUN 39* 26*   CREATININE 1.8* 1.1*   CALCIUM 8.6 8.4*   PHOS 4.2  --        No results for input(s): PROT, ALB, ALKPHOS, ALT, AST, BILITOT, AMYLASE, LIPASE in the last 72 hours. No results for input(s): INR in the last 72 hours. No results for input(s): [employer] in the last 72 hours.     Chronic labs:    Lab Results   Component Value Date    CHOL 209 (H) 06/14/2023    TRIG 68 06/14/2023    HDL 82 06/14/2023    LDLCALC 113 (H) 06/14/2023    TSH 1.160 06/15/2023    INR 1.1 03/30/2023    LABA1C 4.9 05/17/2023       Radiology: REVIEWED DAILY    Assessment:  Acute on chronic hypoxic and hypercapnic respiratory failure - 5L at baseline  Pseudomonas PNA  Sarcoidosis  Pulm HTN  IRMA on CKD - RESOLVED  CKD IIIa, baseline cr 1.2-1.5  Hypernatremia - RESOLVED  Anemia of CKD  PAF anticoagulated on Eliquis  Hx PE/DVT  Hypothyroidism   Hx seizures on Keppra    Plan:  ID following - Merrem switched to Bactrim   Pulm following   Patient tolerating baseline oxygen requirements   Check ambulatory pulse ox  Nephrology following - plan to stop IVF today, lasix remains on hold  Monitor renal fxn, I&O - cr back to apparent baseline per review of previous labs   Resume amlodipine at half dose with holding parameters   Hold home potassium until Lasix resumed    DVT Prophylaxis [] Lovenox  []  Heparin [x] DOAC [] PCDs [] Ambulation    GI Prophylaxis [] PPI  [] H2 Blocker   [] Carafate  [x] Diet/Tube Feeds   Level of care [] Med/Surg  [x] Intermediate  [] ICU   Diet ADULT DIET; Regular    Family contact [x]  N/A    [] At bedside  [] Phone call     Discharge Plan: Home pending ambulatory pulse ox    +++++++++++++++++++++++++++++++++++++++++++++++++  [facility], [address] [place] [address]  +++++++++++++++++++++++++++++++++++++++++++++++++  NOTE: This report was transcribed using voice recognition software. Every effort was made to ensure accuracy; however, inadvertent computerized transcription errors may be present.

## 2023-08-14 NOTE — PROGRESS NOTES
Physical Therapy  Physical Therapy Initial Assessment     Name: Rory Thurman  : 1955  MRN: [id number]      Date of Service: 2023    Evaluating PT:  Maeve Shah PT, DPT [id number]    Room #:  4572/9931-U  Diagnosis:  Chronic respiratory failure with hypoxia ([address]) [J96.11]  Acute on chronic respiratory failure ([address]) [J96.20]  Pneumonia of both lungs due to infectious organism, unspecified part of lung [J18.9]  PMHx/PSHx:    Past Medical History:   Diagnosis Date    A-fib [facility])     follows with Dr Latoya Redmond    Abnormal mammogram     Acute on chronic respiratory failure ([address]) 2017    Anemia     Anemia due to chronic illness     Ankle fracture, left 2008    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    Backache     Benign hypertension     CHF (congestive heart failure) ([facility])     Chronic back pain     Chronic kidney disease     stage 3    Chronic pain disorder     Chronic, continuous use of opioids     Compression fracture of thoracic vertebra (HCC)     T10    COPD (chronic obstructive pulmonary disease) ([address])     Debility     Deformity of ankle and foot, acquired 2011    Depression     Diabetes insipidus ([address])     Diverticulitis     Dry eyes     Encephalopathy acute 2017    Gait disturbance     GERD (gastroesophageal reflux disease)     Hemorrhoids 2012    Hernia     History of blood transfusion approx 2017    History of Clostridium difficile     negative culture 12-15-15; resolved    Hyperlipidemia     Hypothyroidism     Ischemic colitis, enteritis, or enterocolitis ([address])     Long term (current) use of systemic steroids 07/10/2022    Long-term current use of steroids     Lumbar disc herniation     Myalgia and myositis, unspecified     Nephrosclerosis     Nonunion of fracture 2011    Osteoarthritis     severe    PAF (paroxysmal atrial fibrillation) ([facility])     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[facility])     PCWP mean:26.0 PA mean: 24.00; denies any recent issues education:   Pt verbalized understanding Pt demonstrated skill Pt requires further education in this area   yes yes yes     ASSESSMENT:    Conditions Requiring Skilled Therapeutic Intervention:    [x]Decreased strength     [x]Decreased ROM  [x]Decreased functional mobility  [x]Decreased balance   [x]Decreased endurance   [x]Decreased posture  []Decreased sensation  []Decreased coordination   []Decreased vision  []Decreased safety awareness   []Increased pain       Comments:  pt semi-supine in bed upon entry and agreeable to PT evaluation. Pt able to complete supine to sit with no assist. Extra time and elevated HOB required to complete. Pt sat EOB for approximately 20 minutes during session with no assist for sitting balance. STS with significant lift assist with one side step completed with [employer]. Forward flexed posture present throughout. SPO2 on 6L reading 81% following dynamic standing requiring 2-3 minute recovery time to >90%. Assisted pt back to bed at end of session and positioned in seated. All needs met and call light in reach. All lines remained intact. Patient to benefit from continued skilled PT at [place] to improve functional mobility and decrease fall risk. Treatment:  Patient practiced and was instructed in the following treatment:    Bed Mobility: VCs provided for sequencing and safety during mobility. Manual assist provided for completion of task. Transfer Training: Verbal and tactile cueing provided for sequencing and safety during mobility. Manual assist provided for completion of task  Gait Training: one side step with [employer] and verbal cues for proper technique and safety. Manual assist provided for completion of task     Pt's/ family goals   1. Return home    Prognosis is good for reaching above PT goals. Patient and or family understand(s) diagnosis, prognosis, and plan of care.   yes    PHYSICAL THERAPY PLAN OF CARE:    PT POC is established based on physician order and patient diagnosis

## 2023-08-14 NOTE — PROCEDURES
Date: 8/14/2023    Time: 1:25 PM    Patient Placed On BIPAP/CPAP/ Non-Invasive Ventilation? Yes    If no must comment. Facial area red/color change? No           If YES are Blister/Lesion present? No   If yes must notify nursing staff  BIPAP/CPAP skin barrier?   Yes    Skin barrier type:mepilexlite       Comments: Placed on AVAPS per patient request        Neville Liu RCP

## 2023-08-14 NOTE — PLAN OF CARE
Patient's chart updated to reflect:      . - HF care plan, HF education points and HF discharge instructions.  -Orders: daily weights, I/O.  -PCP and/or Cardiologist appointment to be scheduled within 7 days of hospital discharge.  -History of HF, not primary admission Dx. Patient admitted for treatment of acute on chronic respiratory failure.     Marcel Kamara RN RN, BSN  Heart Failure Navigator

## 2023-08-15 LAB
ALBUMIN SERPL-MCNC: 2.7 G/DL (ref 3.5–5.2)
ALP SERPL-CCNC: 63 U/L (ref 35–104)
ALT SERPL-CCNC: 8 U/L (ref 0–32)
ANION GAP SERPL CALCULATED.3IONS-SCNC: 8 MMOL/L (ref 7–16)
AST SERPL-CCNC: 11 U/L (ref 0–31)
BILIRUB SERPL-MCNC: 0.2 MG/DL (ref 0–1.2)
BUN SERPL-MCNC: 9 MG/DL (ref 6–23)
CALCIUM SERPL-MCNC: 8.6 MG/DL (ref 8.6–10.2)
CHLORIDE SERPL-SCNC: 103 MMOL/L (ref 98–107)
CO2 SERPL-SCNC: 29 MMOL/L (ref 22–29)
CREAT SERPL-MCNC: 0.8 MG/DL (ref 0.5–1)
ERYTHROCYTE [DISTWIDTH] IN BLOOD BY AUTOMATED COUNT: 15.8 % (ref 11.5–15)
GFR SERPL CREATININE-BSD FRML MDRD: >60 ML/MIN/1.73M2
GLUCOSE SERPL-MCNC: 79 MG/DL (ref 74–99)
HCT VFR BLD AUTO: 25.2 % (ref 34–48)
HGB BLD-MCNC: 7.7 G/DL (ref 11.5–15.5)
MAGNESIUM SERPL-MCNC: 1.9 MG/DL (ref 1.6–2.6)
MCH RBC QN AUTO: 25.5 PG (ref 26–35)
MCHC RBC AUTO-ENTMCNC: 30.6 G/DL (ref 32–34.5)
MCV RBC AUTO: 83.4 FL (ref 80–99.9)
MICROORGANISM SPEC CULT: NORMAL
MICROORGANISM SPEC CULT: NORMAL
PHOSPHATE SERPL-MCNC: 2.8 MG/DL (ref 2.5–4.5)
PLATELET, FLUORESCENCE: 123 K/UL (ref 130–450)
PMV BLD AUTO: 11.2 FL (ref 7–12)
POTASSIUM SERPL-SCNC: 3.9 MMOL/L (ref 3.5–5)
PROT SERPL-MCNC: 5.6 G/DL (ref 6.4–8.3)
RBC # BLD AUTO: 3.02 M/UL (ref 3.5–5.5)
SERVICE CMNT-IMP: NORMAL
SERVICE CMNT-IMP: NORMAL
SODIUM SERPL-SCNC: 140 MMOL/L (ref 132–146)
SPECIMEN DESCRIPTION: NORMAL
SPECIMEN DESCRIPTION: NORMAL
WBC OTHER # BLD: 4.4 K/UL (ref 4.5–11.5)

## 2023-08-15 PROCEDURE — 6360000002 HC RX W HCPCS: Performed by: FAMILY MEDICINE

## 2023-08-15 PROCEDURE — 83735 ASSAY OF MAGNESIUM: CPT

## 2023-08-15 PROCEDURE — 80053 COMPREHEN METABOLIC PANEL: CPT

## 2023-08-15 PROCEDURE — 36415 COLL VENOUS BLD VENIPUNCTURE: CPT

## 2023-08-15 PROCEDURE — 84100 ASSAY OF PHOSPHORUS: CPT

## 2023-08-15 PROCEDURE — 6370000000 HC RX 637 (ALT 250 FOR IP): Performed by: FAMILY MEDICINE

## 2023-08-15 PROCEDURE — 1200000000 HC SEMI PRIVATE

## 2023-08-15 PROCEDURE — 6370000000 HC RX 637 (ALT 250 FOR IP): Performed by: INTERNAL MEDICINE

## 2023-08-15 PROCEDURE — 2700000000 HC OXYGEN THERAPY PER DAY

## 2023-08-15 PROCEDURE — 94640 AIRWAY INHALATION TREATMENT: CPT

## 2023-08-15 PROCEDURE — 2580000003 HC RX 258: Performed by: FAMILY MEDICINE

## 2023-08-15 PROCEDURE — 85027 COMPLETE CBC AUTOMATED: CPT

## 2023-08-15 PROCEDURE — 94660 CPAP INITIATION&MGMT: CPT

## 2023-08-15 PROCEDURE — 6370000000 HC RX 637 (ALT 250 FOR IP)

## 2023-08-15 RX ORDER — HYDRALAZINE HYDROCHLORIDE 25 MG/1
25 TABLET, FILM COATED ORAL 2 TIMES DAILY
Qty: 90 TABLET | Refills: 3 | Status: ON HOLD | OUTPATIENT
Start: 2023-08-15

## 2023-08-15 RX ORDER — GUAIFENESIN/DEXTROMETHORPHAN 100-10MG/5
5 SYRUP ORAL 3 TIMES DAILY PRN
Qty: 120 ML | Refills: 0 | Status: ON HOLD | OUTPATIENT
Start: 2023-08-15 | End: 2023-08-25

## 2023-08-15 RX ORDER — OXYCODONE HYDROCHLORIDE 5 MG/1
5 TABLET ORAL EVERY 4 HOURS PRN
Qty: 5 TABLET | Refills: 0 | Status: SHIPPED | OUTPATIENT
Start: 2023-08-15 | End: 2023-08-18

## 2023-08-15 RX ORDER — POLYVINYL ALCOHOL 14 MG/ML
1 SOLUTION/ DROPS OPHTHALMIC PRN
Qty: 30 ML | Refills: 4 | Status: ON HOLD | OUTPATIENT
Start: 2023-08-15 | End: 2023-09-14

## 2023-08-15 RX ORDER — PREDNISONE 5 MG/1
5 TABLET ORAL DAILY
Qty: 10 TABLET | Refills: 0 | Status: ON HOLD | OUTPATIENT
Start: 2023-08-16 | End: 2023-08-26

## 2023-08-15 RX ORDER — FAMOTIDINE 20 MG/1
20 TABLET, FILM COATED ORAL DAILY
Qty: 60 TABLET | Refills: 3 | Status: ON HOLD | OUTPATIENT
Start: 2023-08-16

## 2023-08-15 RX ORDER — DESMOPRESSIN ACETATE 0.1 MG/1
0.2 TABLET ORAL 2 TIMES DAILY
Qty: 120 TABLET | Refills: 2 | Status: ON HOLD | OUTPATIENT
Start: 2023-08-15 | End: 2023-11-13

## 2023-08-15 RX ADMIN — PREDNISONE 5 MG: 5 TABLET ORAL at 08:42

## 2023-08-15 RX ADMIN — AMIODARONE HYDROCHLORIDE 200 MG: 200 TABLET ORAL at 08:42

## 2023-08-15 RX ADMIN — IPRATROPIUM BROMIDE AND ALBUTEROL SULFATE 1 DOSE: .5; 2.5 SOLUTION RESPIRATORY (INHALATION) at 09:02

## 2023-08-15 RX ADMIN — BUDESONIDE 250 MCG: 0.25 SUSPENSION RESPIRATORY (INHALATION) at 09:02

## 2023-08-15 RX ADMIN — Medication 10 ML: at 23:32

## 2023-08-15 RX ADMIN — HYDRALAZINE HYDROCHLORIDE 25 MG: 25 TABLET, FILM COATED ORAL at 23:29

## 2023-08-15 RX ADMIN — DULOXETINE HYDROCHLORIDE 60 MG: 60 CAPSULE, DELAYED RELEASE ORAL at 08:41

## 2023-08-15 RX ADMIN — DESMOPRESSIN ACETATE 200 MCG: 0.1 TABLET ORAL at 08:42

## 2023-08-15 RX ADMIN — FERROUS SULFATE TAB 325 MG (65 MG ELEMENTAL FE) 325 MG: 325 (65 FE) TAB at 16:42

## 2023-08-15 RX ADMIN — OXYCODONE HYDROCHLORIDE 5 MG: 5 TABLET ORAL at 13:30

## 2023-08-15 RX ADMIN — OXYCODONE HYDROCHLORIDE 5 MG: 5 TABLET ORAL at 08:42

## 2023-08-15 RX ADMIN — SULFAMETHOXAZOLE AND TRIMETHOPRIM 2 TABLET: 800; 160 TABLET ORAL at 13:30

## 2023-08-15 RX ADMIN — FERROUS SULFATE TAB 325 MG (65 MG ELEMENTAL FE) 325 MG: 325 (65 FE) TAB at 08:41

## 2023-08-15 RX ADMIN — DOCUSATE SODIUM 100 MG: 100 CAPSULE, LIQUID FILLED ORAL at 08:41

## 2023-08-15 RX ADMIN — ARFORMOTEROL TARTRATE 15 MCG: 15 SOLUTION RESPIRATORY (INHALATION) at 09:02

## 2023-08-15 RX ADMIN — METOPROLOL SUCCINATE 50 MG: 50 TABLET, EXTENDED RELEASE ORAL at 08:41

## 2023-08-15 RX ADMIN — SULFAMETHOXAZOLE AND TRIMETHOPRIM 2 TABLET: 800; 160 TABLET ORAL at 23:29

## 2023-08-15 RX ADMIN — FAMOTIDINE 20 MG: 20 TABLET, FILM COATED ORAL at 08:41

## 2023-08-15 RX ADMIN — IPRATROPIUM BROMIDE AND ALBUTEROL SULFATE 1 DOSE: .5; 2.5 SOLUTION RESPIRATORY (INHALATION) at 16:09

## 2023-08-15 RX ADMIN — OXYCODONE HYDROCHLORIDE 5 MG: 5 TABLET ORAL at 23:29

## 2023-08-15 RX ADMIN — ARFORMOTEROL TARTRATE 15 MCG: 15 SOLUTION RESPIRATORY (INHALATION) at 19:53

## 2023-08-15 RX ADMIN — IPRATROPIUM BROMIDE AND ALBUTEROL SULFATE 1 DOSE: .5; 2.5 SOLUTION RESPIRATORY (INHALATION) at 19:53

## 2023-08-15 RX ADMIN — APIXABAN 5 MG: 5 TABLET, FILM COATED ORAL at 08:41

## 2023-08-15 RX ADMIN — LEVOTHYROXINE SODIUM 75 MCG: 0.07 TABLET ORAL at 06:15

## 2023-08-15 RX ADMIN — APIXABAN 5 MG: 5 TABLET, FILM COATED ORAL at 23:30

## 2023-08-15 RX ADMIN — BUDESONIDE 250 MCG: 0.25 SUSPENSION RESPIRATORY (INHALATION) at 19:53

## 2023-08-15 RX ADMIN — Medication 10 ML: at 08:43

## 2023-08-15 RX ADMIN — DOCUSATE SODIUM 100 MG: 100 CAPSULE, LIQUID FILLED ORAL at 23:29

## 2023-08-15 RX ADMIN — HYDRALAZINE HYDROCHLORIDE 25 MG: 25 TABLET, FILM COATED ORAL at 08:42

## 2023-08-15 RX ADMIN — DESMOPRESSIN ACETATE 200 MCG: 0.1 TABLET ORAL at 23:29

## 2023-08-15 RX ADMIN — LEVETIRACETAM 500 MG: 500 TABLET, FILM COATED ORAL at 23:30

## 2023-08-15 RX ADMIN — IPRATROPIUM BROMIDE AND ALBUTEROL SULFATE 1 DOSE: .5; 2.5 SOLUTION RESPIRATORY (INHALATION) at 12:02

## 2023-08-15 RX ADMIN — SULFAMETHOXAZOLE AND TRIMETHOPRIM 2 TABLET: 800; 160 TABLET ORAL at 06:15

## 2023-08-15 RX ADMIN — LEVETIRACETAM 500 MG: 500 TABLET, FILM COATED ORAL at 08:41

## 2023-08-15 ASSESSMENT — PAIN SCALES - GENERAL
PAINLEVEL_OUTOF10: 0
PAINLEVEL_OUTOF10: 9
PAINLEVEL_OUTOF10: 3
PAINLEVEL_OUTOF10: 0
PAINLEVEL_OUTOF10: 9
PAINLEVEL_OUTOF10: 8

## 2023-08-15 ASSESSMENT — PAIN DESCRIPTION - LOCATION
LOCATION: CHEST
LOCATION: GENERALIZED
LOCATION: GENERALIZED

## 2023-08-15 ASSESSMENT — PAIN DESCRIPTION - DESCRIPTORS
DESCRIPTORS: ACHING;DISCOMFORT;SORE
DESCRIPTORS: ACHING;DISCOMFORT;SORE
DESCRIPTORS: ACHING

## 2023-08-15 ASSESSMENT — PAIN DESCRIPTION - ORIENTATION
ORIENTATION: UPPER;LOWER
ORIENTATION: UPPER;LOWER

## 2023-08-15 NOTE — PROGRESS NOTES
Date: 8/14/2023    Time: 10:12 PM    Patient Placed On BIPAP/CPAP/ Non-Invasive Ventilation? Yes    If no must comment. Facial area red/color change? No           If YES are Blister/Lesion present? No   If yes must notify nursing staff  BIPAP/CPAP skin barrier?   Yes    Skin barrier type:mepilexlite       Comments:        Hugo Brody RCP

## 2023-08-15 NOTE — CARE COORDINATION
8/15:  Update Cm note:  Pt is dc today. Pt's dc plan is home with [facility]. Pt was active & just needs a PA-CM placed order. Pt needs 02 testing. Cm spoke with Da at [phone], she has a concentrator that goes up to 10L/NC. Cm set up transportation with [facility] at [address] [place] [address] in soft chart with ambulance form.   Electronically signed by Megan Cardenas RN on 8/15/2023 at 3:22 PM

## 2023-08-15 NOTE — PROGRESS NOTES
[facility] MD Andres Li Junior, MD Ben Samayoa, MD Tracy Kurtz, CNP   Rita Henley, ANP  Ildefonso Kinney, CNP  Progress Note    8/15/2023    SUBJECTIVE:     Azotemia improving with cr down to 1   Appear euvolemic with no JVD and no edema   Remain on 7-8 L o2/nc    PROBLEM LIST:    Principal Problem:    Acute on chronic respiratory failure ([address])  Resolved Problems:    * No resolved hospital problems. *         DIET:    ADULT DIET;  Regular     MEDS (scheduled):    sulfamethoxazole-trimethoprim  2 tablet Oral 3 times per day    famotidine  20 mg Oral Daily    ipratropium 0.5 mg-albuterol 2.5 mg  1 Dose Inhalation Q4H WA RT    predniSONE  5 mg Oral Daily    amiodarone  200 mg Oral Daily    desmopressin  200 mcg Oral BID    docusate sodium  100 mg Oral BID    DULoxetine  60 mg Oral Daily    apixaban  5 mg Oral BID    ferrous sulfate  325 mg Oral BID WC    [Held by provider] furosemide  40 mg Oral Daily    hydrALAZINE  25 mg Oral BID    levETIRAcetam  500 mg Oral BID    levothyroxine  75 mcg Oral QAM AC    metoprolol succinate  50 mg Oral BID    [Held by provider] potassium chloride  20 mEq Oral Daily    sodium chloride flush  10 mL IntraVENous 2 times per day    budesonide  0.25 mg Nebulization BID RT    And    arformoterol tartrate  15 mcg Nebulization BID RT       MEDS (infusions):   dextrose      dextrose 5 % and 0.45 % NaCl 65 mL/hr at 08/13/23 1138    sodium chloride         MEDS (prn):  sodium phosphate IVPB **OR** sodium phosphate IVPB **OR** sodium phosphate IVPB, glucose, dextrose bolus **OR** dextrose bolus, glucagon (rDNA), dextrose, albuterol, guaiFENesin-dextromethorphan, oxyCODONE, sodium chloride flush, sodium chloride, promethazine **OR** ondansetron, polyethylene glycol, acetaminophen **OR** acetaminophen, polyvinyl alcohol    PHYSICAL EXAM:     Patient Vitals for the past 24 hrs:   BP Temp Temp src Pulse Resp SpO2   08/15/23 0940 -- -- -- -- -- 94 %

## 2023-08-15 NOTE — PROGRESS NOTES
PULSE OX ON 4 LITERS SITTING 79%   PULSE OX ON 10 LITERS SITTING RECOVERY 93%   PULSE OX ON 4 LITERS STANDING 80%  PULSE OX ON 10 LITERS AMBULATING RECOVERY 95%

## 2023-08-15 NOTE — CARE COORDINATION
8/15:  Update CM Note:  CM xcl transportation for tonight due to 02 needs 02 10L/NC. Pulmonary advise 02 needs.   Electronically signed by Marnie Mcconnell RN on 8/15/2023 at 3:54 PM

## 2023-08-15 NOTE — DISCHARGE SUMMARY
Hospitalist Discharge Summary    Patient ID: Gloria Mcgovern   Patient : 1955  Patient's PCP: Srinivas Martinez MD    Admit Date: 2023   Admitting Physician: No admitting provider for patient encounter. Discharge Date:  8/15/2023   Discharge Physician: Ana Hickman MD   Discharge Condition: Stable  Discharge Disposition: [facility] course in brief:  (Please refer to daily progress notes for a comprehensive review of the hospitalization by requesting medical records)    Patient presented to the ED after being found at home lying in back with SpO2 82% after running out of O2. Patient has had numerous hospital admissions for the same complaint. She is on 6L O2 at baseline. In ED patient was stable on baseline O2 requirements though CXR consistent with PNA. She was initiated on Cefepime and Doxy, however, resp cx grew pseudomonas and ID was c/s who switched ATB to Merrem and then Bactrim. Course was complicated by IRMA for which nephrology was consulted. Pulmonology is also following. IRMA resolved. Oxygen requirements are back to baseline. Cleared from pulmonology for outpatient follow up with Pulmonology at [facility]. Patient is clinically, HD stable at baseline O2 requirement at the time of discharge. Consults:   IP CONSULT TO INTERNAL MEDICINE  IP CONSULT TO PULMONOLOGY  IP CONSULT TO NEPHROLOGY  IP CONSULT TO INFECTIOUS DISEASES  IP CONSULT TO INFECTIOUS DISEASES    Discharge Diagnoses:  Present on Admission:   Acute on chronic respiratory failure (HCC)  Pseudomonas PNA  Sarcoidosis  Pulm HTN  IRMA on CKD - RESOLVED  CKD IIIa, baseline cr 1.2-1.5  Hypernatremia - RESOLVED  Anemia of CKD  PAF anticoagulated on Eliquis  Hx PE/DVT  Hypothyroidism   Hx seizures on Keppra        Discharge Instructions / Follow up: Follow-up with PCP within 1 week of discharge. Follow-up with consultants as indicated by them. Compliance with medications as prescribed on discharge.     Future Appointments mg     OXYGEN     polyethylene glycol 17 g packet  Commonly known as: GLYCOLAX  Take 17 g by mouth daily as needed for Constipation     potassium chloride 20 MEQ extended release tablet  Commonly known as: KLOR-CON M  Take 1 tablet by mouth daily     sodium chloride 0.65 % nasal spray  Commonly known as: OCEAN           * This list has 2 medication(s) that are the same as other medications prescribed for you. Read the directions carefully, and ask your doctor or other care provider to review them with you. STOP taking these medications      cycloSPORINE (PF) 0.09 % Soln     guaiFENesin 400 MG tablet               Where to Get Your Medications        These medications were sent to [facility], [address] [phone] [address] [facility] [address]      Phone: [phone]   desmopressin 0.1 MG tablet  famotidine 20 MG tablet  guaiFENesin-dextromethorphan 100-10 MG/5ML syrup  hydrALAZINE 25 MG tablet  oxyCODONE 5 MG immediate release tablet  polyvinyl alcohol 1.4 % ophthalmic solution  predniSONE 5 MG tablet       You can get these medications from any pharmacy    Bring a paper prescription for each of these medications  sulfamethoxazole-trimethoprim 800-160 MG per tablet         Time Spent on discharge is more than 45 minutes in the examination, evaluation, counseling and review of medications and discharge plan.    +++++++++++++++++++++++++++++++++++++++++++++++++  Mi Cyr MD  [place], [place]  +++++++++++++++++++++++++++++++++++++++++++++++++  NOTE: This report was transcribed using voice recognition software. Every effort was made to ensure accuracy; however, inadvertent computerized transcription errors may be present.

## 2023-08-15 NOTE — PROGRESS NOTES
Notified Dr. Tessa Anne of patient's ambulating pulse ox and discharge orders. 65 Dr. Tessa Anne requested that I communicate with Pulmonology. Pulm said that they have recommended rehab multiple times, but yet requests to go home. Pulm does not recommend that the patient goes home tonight.

## 2023-08-16 ENCOUNTER — TELEPHONE (OUTPATIENT)
Dept: FAMILY MEDICINE CLINIC | Age: 68
End: 2023-08-16

## 2023-08-16 LAB
FERRITIN SERPL-MCNC: 240 NG/ML
IRON SATN MFR SERPL: 12 % (ref 15–50)
IRON SERPL-MCNC: 19 UG/DL (ref 37–145)
TIBC SERPL-MCNC: 153 UG/DL (ref 250–450)

## 2023-08-16 PROCEDURE — 2580000003 HC RX 258: Performed by: FAMILY MEDICINE

## 2023-08-16 PROCEDURE — 83540 ASSAY OF IRON: CPT

## 2023-08-16 PROCEDURE — 2580000003 HC RX 258: Performed by: INTERNAL MEDICINE

## 2023-08-16 PROCEDURE — 2700000000 HC OXYGEN THERAPY PER DAY

## 2023-08-16 PROCEDURE — 6370000000 HC RX 637 (ALT 250 FOR IP): Performed by: STUDENT IN AN ORGANIZED HEALTH CARE EDUCATION/TRAINING PROGRAM

## 2023-08-16 PROCEDURE — 83550 IRON BINDING TEST: CPT

## 2023-08-16 PROCEDURE — 82728 ASSAY OF FERRITIN: CPT

## 2023-08-16 PROCEDURE — 6370000000 HC RX 637 (ALT 250 FOR IP)

## 2023-08-16 PROCEDURE — 6360000002 HC RX W HCPCS: Performed by: INTERNAL MEDICINE

## 2023-08-16 PROCEDURE — 6360000002 HC RX W HCPCS: Performed by: FAMILY MEDICINE

## 2023-08-16 PROCEDURE — 1200000000 HC SEMI PRIVATE

## 2023-08-16 PROCEDURE — 6370000000 HC RX 637 (ALT 250 FOR IP): Performed by: INTERNAL MEDICINE

## 2023-08-16 PROCEDURE — 6370000000 HC RX 637 (ALT 250 FOR IP): Performed by: FAMILY MEDICINE

## 2023-08-16 PROCEDURE — 94660 CPAP INITIATION&MGMT: CPT

## 2023-08-16 PROCEDURE — 36415 COLL VENOUS BLD VENIPUNCTURE: CPT

## 2023-08-16 PROCEDURE — 94640 AIRWAY INHALATION TREATMENT: CPT

## 2023-08-16 RX ORDER — DIPHENHYDRAMINE HCL 25 MG
25 TABLET ORAL ONCE
Status: COMPLETED | OUTPATIENT
Start: 2023-08-16 | End: 2023-08-16

## 2023-08-16 RX ORDER — DIPHENHYDRAMINE HYDROCHLORIDE 50 MG/ML
INJECTION INTRAMUSCULAR; INTRAVENOUS
Status: DISPENSED
Start: 2023-08-16 | End: 2023-08-16

## 2023-08-16 RX ORDER — DIPHENHYDRAMINE HYDROCHLORIDE 50 MG/ML
25 INJECTION INTRAMUSCULAR; INTRAVENOUS ONCE
Status: COMPLETED | OUTPATIENT
Start: 2023-08-16 | End: 2023-08-16

## 2023-08-16 RX ADMIN — DULOXETINE HYDROCHLORIDE 60 MG: 60 CAPSULE, DELAYED RELEASE ORAL at 08:31

## 2023-08-16 RX ADMIN — APIXABAN 5 MG: 5 TABLET, FILM COATED ORAL at 20:43

## 2023-08-16 RX ADMIN — DIPHENHYDRAMINE HYDROCHLORIDE 25 MG: 25 TABLET ORAL at 18:13

## 2023-08-16 RX ADMIN — IPRATROPIUM BROMIDE AND ALBUTEROL SULFATE 1 DOSE: .5; 2.5 SOLUTION RESPIRATORY (INHALATION) at 21:15

## 2023-08-16 RX ADMIN — HYDRALAZINE HYDROCHLORIDE 25 MG: 25 TABLET, FILM COATED ORAL at 20:42

## 2023-08-16 RX ADMIN — PREDNISONE 5 MG: 5 TABLET ORAL at 08:31

## 2023-08-16 RX ADMIN — OXYCODONE HYDROCHLORIDE 5 MG: 5 TABLET ORAL at 20:42

## 2023-08-16 RX ADMIN — DOCUSATE SODIUM 100 MG: 100 CAPSULE, LIQUID FILLED ORAL at 20:43

## 2023-08-16 RX ADMIN — Medication 10 ML: at 11:08

## 2023-08-16 RX ADMIN — FERROUS SULFATE TAB 325 MG (65 MG ELEMENTAL FE) 325 MG: 325 (65 FE) TAB at 16:56

## 2023-08-16 RX ADMIN — METOPROLOL SUCCINATE 50 MG: 50 TABLET, EXTENDED RELEASE ORAL at 01:42

## 2023-08-16 RX ADMIN — ARFORMOTEROL TARTRATE 15 MCG: 15 SOLUTION RESPIRATORY (INHALATION) at 08:22

## 2023-08-16 RX ADMIN — BUDESONIDE 250 MCG: 0.25 SUSPENSION RESPIRATORY (INHALATION) at 21:15

## 2023-08-16 RX ADMIN — GUAIFENESIN SYRUP AND DEXTROMETHORPHAN 5 ML: 100; 10 SYRUP ORAL at 13:41

## 2023-08-16 RX ADMIN — LEVETIRACETAM 500 MG: 500 TABLET, FILM COATED ORAL at 20:42

## 2023-08-16 RX ADMIN — FERROUS SULFATE TAB 325 MG (65 MG ELEMENTAL FE) 325 MG: 325 (65 FE) TAB at 08:30

## 2023-08-16 RX ADMIN — METOPROLOL SUCCINATE 50 MG: 50 TABLET, EXTENDED RELEASE ORAL at 20:43

## 2023-08-16 RX ADMIN — DIPHENHYDRAMINE HYDROCHLORIDE 25 MG: 50 INJECTION, SOLUTION INTRAMUSCULAR; INTRAVENOUS at 04:48

## 2023-08-16 RX ADMIN — SULFAMETHOXAZOLE AND TRIMETHOPRIM 2 TABLET: 800; 160 TABLET ORAL at 22:03

## 2023-08-16 RX ADMIN — ARFORMOTEROL TARTRATE 15 MCG: 15 SOLUTION RESPIRATORY (INHALATION) at 21:13

## 2023-08-16 RX ADMIN — OXYCODONE HYDROCHLORIDE 5 MG: 5 TABLET ORAL at 13:41

## 2023-08-16 RX ADMIN — IPRATROPIUM BROMIDE AND ALBUTEROL SULFATE 1 DOSE: .5; 2.5 SOLUTION RESPIRATORY (INHALATION) at 16:11

## 2023-08-16 RX ADMIN — Medication 10 ML: at 20:44

## 2023-08-16 RX ADMIN — AMIODARONE HYDROCHLORIDE 200 MG: 200 TABLET ORAL at 08:31

## 2023-08-16 RX ADMIN — BUDESONIDE 250 MCG: 0.25 SUSPENSION RESPIRATORY (INHALATION) at 08:23

## 2023-08-16 RX ADMIN — SULFAMETHOXAZOLE AND TRIMETHOPRIM 2 TABLET: 800; 160 TABLET ORAL at 13:32

## 2023-08-16 RX ADMIN — HYDRALAZINE HYDROCHLORIDE 25 MG: 25 TABLET, FILM COATED ORAL at 08:30

## 2023-08-16 RX ADMIN — APIXABAN 5 MG: 5 TABLET, FILM COATED ORAL at 08:30

## 2023-08-16 RX ADMIN — IPRATROPIUM BROMIDE AND ALBUTEROL SULFATE 1 DOSE: .5; 2.5 SOLUTION RESPIRATORY (INHALATION) at 11:42

## 2023-08-16 RX ADMIN — SODIUM CHLORIDE 125 MG: 9 INJECTION, SOLUTION INTRAVENOUS at 19:00

## 2023-08-16 RX ADMIN — METOPROLOL SUCCINATE 50 MG: 50 TABLET, EXTENDED RELEASE ORAL at 08:30

## 2023-08-16 RX ADMIN — DESMOPRESSIN ACETATE 200 MCG: 0.1 TABLET ORAL at 20:42

## 2023-08-16 RX ADMIN — FAMOTIDINE 20 MG: 20 TABLET, FILM COATED ORAL at 08:31

## 2023-08-16 RX ADMIN — IPRATROPIUM BROMIDE AND ALBUTEROL SULFATE 1 DOSE: .5; 2.5 SOLUTION RESPIRATORY (INHALATION) at 08:23

## 2023-08-16 RX ADMIN — DESMOPRESSIN ACETATE 200 MCG: 0.1 TABLET ORAL at 08:31

## 2023-08-16 RX ADMIN — LEVOTHYROXINE SODIUM 75 MCG: 0.07 TABLET ORAL at 06:36

## 2023-08-16 RX ADMIN — DIPHENHYDRAMINE HYDROCHLORIDE 25 MG: 25 TABLET ORAL at 10:33

## 2023-08-16 RX ADMIN — LEVETIRACETAM 500 MG: 500 TABLET, FILM COATED ORAL at 08:30

## 2023-08-16 ASSESSMENT — PAIN DESCRIPTION - LOCATION: LOCATION: RIB CAGE

## 2023-08-16 ASSESSMENT — PAIN SCALES - GENERAL
PAINLEVEL_OUTOF10: 9
PAINLEVEL_OUTOF10: 9

## 2023-08-16 NOTE — PLAN OF CARE
Problem: Chronic Conditions and Co-morbidities  Goal: Patient's chronic conditions and co-morbidity symptoms are monitored and maintained or improved  Outcome: Progressing     Problem: Safety - Adult  Goal: Free from fall injury  Outcome: Progressing     Problem: Pain  Goal: Verbalizes/displays adequate comfort level or baseline comfort level  Outcome: Progressing     Problem: Skin/Tissue Integrity  Goal: Absence of new skin breakdown  Description: 1. Monitor for areas of redness and/or skin breakdown  2. Assess vascular access sites hourly  3. Every 4-6 hours minimum:  Change oxygen saturation probe site  4. Every 4-6 hours:  If on nasal continuous positive airway pressure, respiratory therapy assess nares and determine need for appliance change or resting period.   Outcome: Progressing

## 2023-08-16 NOTE — PROGRESS NOTES
Date: 8/15/2023    Time: 11:03 PM    Patient Placed On BIPAP/CPAP/ Non-Invasive Ventilation? Yes    If no must comment. Facial area red/color change? No           If YES are Blister/Lesion present? No   If yes must notify nursing staff  BIPAP/CPAP skin barrier?   Yes    Skin barrier type:mepilexlite       Comments:        Luis Calderon RCP

## 2023-08-16 NOTE — TELEPHONE ENCOUNTER
Patient called requesting medication refill on her cough medication. She states it was discussed at her appt here on 8/4/23.   Please advise

## 2023-08-16 NOTE — CARE COORDINATION
8/16:  Update CM Note:  Pt dc was held on 8/15 due to high 02 needs. Pt's dc plan is home with [facility]. Pt was active with [facility] & needs a PA-CM placed order. Pt needs 02 testing. Cm spoke with Anthony/Millicent at [phone], she has a concentrator that goes up to 10L/NC. Ambulance form placed in envelope in soft chart. Per Pulmonary's note they recommended pt to [facility] to [facility]. CM left referral [facility] to see if pt qualifies. [place] would be an option since they offer high 02 & have Dr. Lucien Lizama. Sw/KAREEN will continue to follow for dc planning.   Electronically signed by Megan Cardenas RN on 8/16/2023 at 3:33 PM

## 2023-08-17 PROBLEM — E44.0 MODERATE PROTEIN-CALORIE MALNUTRITION (HCC): Chronic | Status: ACTIVE | Noted: 2023-08-17

## 2023-08-17 LAB
ALBUMIN SERPL-MCNC: 3.4 G/DL (ref 3.5–5.2)
ALP SERPL-CCNC: 80 U/L (ref 35–104)
ALT SERPL-CCNC: 13 U/L (ref 0–32)
ANION GAP SERPL CALCULATED.3IONS-SCNC: 15 MMOL/L (ref 7–16)
AST SERPL-CCNC: 18 U/L (ref 0–31)
BASOPHILS # BLD: 0 K/UL (ref 0–0.2)
BASOPHILS NFR BLD: 0 % (ref 0–2)
BILIRUB SERPL-MCNC: <0.2 MG/DL (ref 0–1.2)
BUN SERPL-MCNC: 8 MG/DL (ref 6–23)
CALCIUM SERPL-MCNC: 9 MG/DL (ref 8.6–10.2)
CHLORIDE SERPL-SCNC: 93 MMOL/L (ref 98–107)
CO2 SERPL-SCNC: 27 MMOL/L (ref 22–29)
CREAT SERPL-MCNC: 1 MG/DL (ref 0.5–1)
EOSINOPHIL # BLD: 0.39 K/UL (ref 0.05–0.5)
EOSINOPHILS RELATIVE PERCENT: 10 % (ref 0–6)
ERYTHROCYTE [DISTWIDTH] IN BLOOD BY AUTOMATED COUNT: 15.4 % (ref 11.5–15)
FOLATE SERPL-MCNC: 9.8 NG/ML (ref 4.8–24.2)
GFR SERPL CREATININE-BSD FRML MDRD: >60 ML/MIN/1.73M2
GLUCOSE SERPL-MCNC: 73 MG/DL (ref 74–99)
HCT VFR BLD AUTO: 29.4 % (ref 34–48)
HGB BLD-MCNC: 9.1 G/DL (ref 11.5–15.5)
LYMPHOCYTES NFR BLD: 0.25 K/UL (ref 1.5–4)
LYMPHOCYTES RELATIVE PERCENT: 6 % (ref 20–42)
MCH RBC QN AUTO: 24.9 PG (ref 26–35)
MCHC RBC AUTO-ENTMCNC: 31 G/DL (ref 32–34.5)
MCV RBC AUTO: 80.5 FL (ref 80–99.9)
MONOCYTES NFR BLD: 0.14 K/UL (ref 0.1–0.95)
MONOCYTES NFR BLD: 4 % (ref 2–12)
NEUTROPHILS NFR BLD: 81 % (ref 43–80)
NEUTS SEG NFR BLD: 3.32 K/UL (ref 1.8–7.3)
PLATELET # BLD AUTO: 194 K/UL (ref 130–450)
PMV BLD AUTO: 10.9 FL (ref 7–12)
POTASSIUM SERPL-SCNC: 4 MMOL/L (ref 3.5–5)
PROT SERPL-MCNC: 6.1 G/DL (ref 6.4–8.3)
RBC # BLD AUTO: 3.65 M/UL (ref 3.5–5.5)
RBC # BLD: ABNORMAL 10*6/UL
SODIUM SERPL-SCNC: 135 MMOL/L (ref 132–146)
VIT B12 SERPL-MCNC: 724 PG/ML (ref 211–946)
WBC OTHER # BLD: 4.1 K/UL (ref 4.5–11.5)

## 2023-08-17 PROCEDURE — 80053 COMPREHEN METABOLIC PANEL: CPT

## 2023-08-17 PROCEDURE — 6360000002 HC RX W HCPCS: Performed by: FAMILY MEDICINE

## 2023-08-17 PROCEDURE — 6360000002 HC RX W HCPCS: Performed by: INTERNAL MEDICINE

## 2023-08-17 PROCEDURE — 6370000000 HC RX 637 (ALT 250 FOR IP): Performed by: INTERNAL MEDICINE

## 2023-08-17 PROCEDURE — 82607 VITAMIN B-12: CPT

## 2023-08-17 PROCEDURE — 94640 AIRWAY INHALATION TREATMENT: CPT

## 2023-08-17 PROCEDURE — 6370000000 HC RX 637 (ALT 250 FOR IP): Performed by: FAMILY MEDICINE

## 2023-08-17 PROCEDURE — 94660 CPAP INITIATION&MGMT: CPT

## 2023-08-17 PROCEDURE — 36415 COLL VENOUS BLD VENIPUNCTURE: CPT

## 2023-08-17 PROCEDURE — 1200000000 HC SEMI PRIVATE

## 2023-08-17 PROCEDURE — 85025 COMPLETE CBC W/AUTO DIFF WBC: CPT

## 2023-08-17 PROCEDURE — 2580000003 HC RX 258: Performed by: FAMILY MEDICINE

## 2023-08-17 PROCEDURE — 6370000000 HC RX 637 (ALT 250 FOR IP)

## 2023-08-17 PROCEDURE — 2700000000 HC OXYGEN THERAPY PER DAY

## 2023-08-17 PROCEDURE — 6370000000 HC RX 637 (ALT 250 FOR IP): Performed by: STUDENT IN AN ORGANIZED HEALTH CARE EDUCATION/TRAINING PROGRAM

## 2023-08-17 PROCEDURE — 82746 ASSAY OF FOLIC ACID SERUM: CPT

## 2023-08-17 PROCEDURE — 2580000003 HC RX 258: Performed by: INTERNAL MEDICINE

## 2023-08-17 RX ORDER — DIPHENHYDRAMINE HCL 25 MG
25 TABLET ORAL EVERY 6 HOURS PRN
Status: DISCONTINUED | OUTPATIENT
Start: 2023-08-17 | End: 2023-08-18 | Stop reason: HOSPADM

## 2023-08-17 RX ORDER — DIPHENHYDRAMINE HCL 25 MG
25 TABLET ORAL EVERY 6 HOURS PRN
Qty: 15 TABLET | Refills: 0 | Status: ON HOLD | OUTPATIENT
Start: 2023-08-17 | End: 2023-09-16

## 2023-08-17 RX ORDER — DIPHENHYDRAMINE HYDROCHLORIDE 50 MG/ML
25 INJECTION INTRAMUSCULAR; INTRAVENOUS ONCE
Status: COMPLETED | OUTPATIENT
Start: 2023-08-17 | End: 2023-08-17

## 2023-08-17 RX ADMIN — PREDNISONE 5 MG: 5 TABLET ORAL at 08:12

## 2023-08-17 RX ADMIN — BUDESONIDE 250 MCG: 0.25 SUSPENSION RESPIRATORY (INHALATION) at 10:10

## 2023-08-17 RX ADMIN — IPRATROPIUM BROMIDE AND ALBUTEROL SULFATE 1 DOSE: .5; 2.5 SOLUTION RESPIRATORY (INHALATION) at 10:11

## 2023-08-17 RX ADMIN — SULFAMETHOXAZOLE AND TRIMETHOPRIM 2 TABLET: 800; 160 TABLET ORAL at 20:49

## 2023-08-17 RX ADMIN — IPRATROPIUM BROMIDE AND ALBUTEROL SULFATE 1 DOSE: .5; 2.5 SOLUTION RESPIRATORY (INHALATION) at 12:11

## 2023-08-17 RX ADMIN — HYDRALAZINE HYDROCHLORIDE 25 MG: 25 TABLET, FILM COATED ORAL at 08:12

## 2023-08-17 RX ADMIN — GUAIFENESIN SYRUP AND DEXTROMETHORPHAN 5 ML: 100; 10 SYRUP ORAL at 08:22

## 2023-08-17 RX ADMIN — DIPHENHYDRAMINE HYDROCHLORIDE 25 MG: 50 INJECTION, SOLUTION INTRAMUSCULAR; INTRAVENOUS at 00:51

## 2023-08-17 RX ADMIN — OXYCODONE HYDROCHLORIDE 5 MG: 5 TABLET ORAL at 08:21

## 2023-08-17 RX ADMIN — BUDESONIDE 250 MCG: 0.25 SUSPENSION RESPIRATORY (INHALATION) at 20:10

## 2023-08-17 RX ADMIN — OXYCODONE HYDROCHLORIDE 5 MG: 5 TABLET ORAL at 00:52

## 2023-08-17 RX ADMIN — DESMOPRESSIN ACETATE 200 MCG: 0.1 TABLET ORAL at 08:11

## 2023-08-17 RX ADMIN — FAMOTIDINE 20 MG: 20 TABLET, FILM COATED ORAL at 08:12

## 2023-08-17 RX ADMIN — Medication 10 ML: at 10:17

## 2023-08-17 RX ADMIN — SULFAMETHOXAZOLE AND TRIMETHOPRIM 2 TABLET: 800; 160 TABLET ORAL at 05:47

## 2023-08-17 RX ADMIN — APIXABAN 5 MG: 5 TABLET, FILM COATED ORAL at 08:11

## 2023-08-17 RX ADMIN — HYDRALAZINE HYDROCHLORIDE 25 MG: 25 TABLET, FILM COATED ORAL at 20:51

## 2023-08-17 RX ADMIN — ARFORMOTEROL TARTRATE 15 MCG: 15 SOLUTION RESPIRATORY (INHALATION) at 20:10

## 2023-08-17 RX ADMIN — IPRATROPIUM BROMIDE AND ALBUTEROL SULFATE 1 DOSE: .5; 2.5 SOLUTION RESPIRATORY (INHALATION) at 20:10

## 2023-08-17 RX ADMIN — METOPROLOL SUCCINATE 50 MG: 50 TABLET, EXTENDED RELEASE ORAL at 08:11

## 2023-08-17 RX ADMIN — DIPHENHYDRAMINE HYDROCHLORIDE 25 MG: 25 TABLET ORAL at 09:31

## 2023-08-17 RX ADMIN — DESMOPRESSIN ACETATE 200 MCG: 0.1 TABLET ORAL at 20:49

## 2023-08-17 RX ADMIN — FERROUS SULFATE TAB 325 MG (65 MG ELEMENTAL FE) 325 MG: 325 (65 FE) TAB at 16:46

## 2023-08-17 RX ADMIN — AMIODARONE HYDROCHLORIDE 200 MG: 200 TABLET ORAL at 08:13

## 2023-08-17 RX ADMIN — SODIUM CHLORIDE 125 MG: 9 INJECTION, SOLUTION INTRAVENOUS at 08:13

## 2023-08-17 RX ADMIN — OXYCODONE HYDROCHLORIDE 5 MG: 5 TABLET ORAL at 16:53

## 2023-08-17 RX ADMIN — LEVETIRACETAM 500 MG: 500 TABLET, FILM COATED ORAL at 08:12

## 2023-08-17 RX ADMIN — APIXABAN 5 MG: 5 TABLET, FILM COATED ORAL at 20:51

## 2023-08-17 RX ADMIN — SULFAMETHOXAZOLE AND TRIMETHOPRIM 2 TABLET: 800; 160 TABLET ORAL at 13:15

## 2023-08-17 RX ADMIN — METOPROLOL SUCCINATE 50 MG: 50 TABLET, EXTENDED RELEASE ORAL at 20:50

## 2023-08-17 RX ADMIN — LEVOTHYROXINE SODIUM 75 MCG: 0.07 TABLET ORAL at 05:47

## 2023-08-17 RX ADMIN — LEVETIRACETAM 500 MG: 500 TABLET, FILM COATED ORAL at 20:51

## 2023-08-17 RX ADMIN — FERROUS SULFATE TAB 325 MG (65 MG ELEMENTAL FE) 325 MG: 325 (65 FE) TAB at 08:12

## 2023-08-17 RX ADMIN — DULOXETINE HYDROCHLORIDE 60 MG: 60 CAPSULE, DELAYED RELEASE ORAL at 08:12

## 2023-08-17 RX ADMIN — ARFORMOTEROL TARTRATE 15 MCG: 15 SOLUTION RESPIRATORY (INHALATION) at 10:11

## 2023-08-17 ASSESSMENT — PAIN DESCRIPTION - LOCATION
LOCATION: RIB CAGE
LOCATION: RIB CAGE

## 2023-08-17 ASSESSMENT — PAIN SCALES - GENERAL
PAINLEVEL_OUTOF10: 0
PAINLEVEL_OUTOF10: 8
PAINLEVEL_OUTOF10: 10
PAINLEVEL_OUTOF10: 10

## 2023-08-17 NOTE — PLAN OF CARE
Problem: Chronic Conditions and Co-morbidities  Goal: Patient's chronic conditions and co-morbidity symptoms are monitored and maintained or improved  8/17/2023 0747 by Mahogany Hernandez RN  Outcome: Progressing  8/16/2023 1751 by Mahogany Hernandez RN  Outcome: Progressing     Problem: Safety - Adult  Goal: Free from fall injury  Outcome: Progressing     Problem: Pain  Goal: Verbalizes/displays adequate comfort level or baseline comfort level  8/17/2023 0747 by Mahogany Hernandez RN  Outcome: Progressing  8/16/2023 1751 by Mahogany Hernandez RN  Outcome: Progressing     Problem: Skin/Tissue Integrity  Goal: Absence of new skin breakdown  Description: 1. Monitor for areas of redness and/or skin breakdown  2. Assess vascular access sites hourly  3. Every 4-6 hours minimum:  Change oxygen saturation probe site  4. Every 4-6 hours:  If on nasal continuous positive airway pressure, respiratory therapy assess nares and determine need for appliance change or resting period.   Outcome: Progressing

## 2023-08-17 NOTE — PROGRESS NOTES
Oxygen Template   PULSE OX ON ROOM AIR SITTING_78___%   PULSE OX ON ___4_LITERS SITTING RECOVERY ___95_%   PULSE OX ON ROOM AIR AMBULATING ___77_%   PULSE OX ON ___5_LITERS AMBULATING RECOVERY __96_% . Pulse ox on 6 liters at rest after activity is 99%    RN/ Please document Pulse Ox in PG note the following way;(Cut and Paste, Fill in Blanks). If home o2 is needed.   If > 4 ltr needed to recover, please show insignificant recovery on 4 ltr and then increase for recovery rate

## 2023-08-17 NOTE — DISCHARGE SUMMARY
FINDINGS: Mediastinum: There are no signs of abnormal supraclavicular or axillary lymphadenopathy. The heart does not appear enlarged. There are no signs of pericardial effusion. No significant coronary artery calcifications are observed. The main pulmonary artery measures 43 mm. This suggest ectasias. This finding is nonspecific but has been associated with pulmonary artery hypertension. The ascending thoracic aorta measures 31 mm that is within the normal range. There are subcentimeter right paratracheal and AP window lymph nodes. In the absence of contrast, evaluation for lymphadenopathy is degraded technically. Lungs/pleura: There are signs of subpleural honeycombing and interlobular septal thickening within the right mid and upper lung field suggests underlying fibrotic disease. The overall appearance is process is stable. No new consolidation or pleural effusions are noted on the right. At the left lung base, there is a new consolidative process and/or atelectasis. There are air bronchograms within this process and it extends near the diaphragm. No left pleural effusions are observed. There are underlying signs of chronic obstructive pulmonary disease. Upper Abdomen: Images through the upper abdomen revealed no acute abnormalities of the liver. The pancreas, spleen and adrenal glands appear within the normal range. There is limited evaluation the kidneys. Soft Tissues/Bones: There is a treated compression fracture of T10. This demonstrates signs of retropulsion with contrast extending centrally. There are compression fractures of  T5, T6, T7,  T8 and T12.     1. There is a new patchy parenchymal density at the left lung base concerning for pneumonia and or atelectasis. 2. Coarse interstitial markings within the lungs suggestive underlying pulmonary fibrotic disease, stable 3. Dilation of the pulmonary arteries concerning for pulmonary artery hypertension.   If of clinical concern, a or cardia

## 2023-08-17 NOTE — PROGRESS NOTES
Patient refusing to go home and now says \" too upset to go home, I want to go to rehab\". Patient called spouse while this nurse at bedside and put spouse on speaker. Spouse stated [place] never wants to be in the hospital but says she is too short of breath to go home\". This nurse explained will notify attending.

## 2023-08-17 NOTE — DISCHARGE INSTR - DIET

## 2023-08-17 NOTE — PROGRESS NOTES
Date: 8/16/2023    Time: 11:31 PM    Patient Placed On BIPAP/CPAP/ Non-Invasive Ventilation? Yes    If no must comment. Facial area red/color change? No           If YES are Blister/Lesion present? No   If yes must notify nursing staff  BIPAP/CPAP skin barrier?   Yes    Skin barrier type:mepilexlite       Comments:        Kingston Ramirez RCP

## 2023-08-17 NOTE — PROGRESS NOTES
Comprehensive Nutrition Assessment    Type and Reason for Visit:  Initial (LOS)    Nutrition Recommendations/Plan:   Recommend and start Glucerna supplement daily and Gelatein high protein supplement daily to help meet nutritional needs. Malnutrition Assessment:  Malnutrition Status: Moderate malnutrition (08/17/23 0932)    Context:  Chronic Illness     Findings of the 6 clinical characteristics of malnutrition:  Energy Intake:  75% or less estimated energy requirements for 1 month or longer  Weight Loss:  Unable to assess (d/t possible fluid shifts ; hx of CHF)     Body Fat Loss:  Mild body fat loss Orbital, Triceps   Muscle Mass Loss:  Mild muscle mass loss Temples (temporalis), Clavicles (pectoralis & deltoids)  Fluid Accumulation:  No significant fluid accumulation     Strength:  Not Performed    Nutrition Assessment:    Patient adm w/ SOB ; noted acute on chronic hypoxic respiratory failure 2/2 Stenotrophomonas maltophilia pneumonia ; hx of pulmonary sarcoidosis with severe pulmonary hypertension ; prior abdominal wall hematoma ; recent and frequent hospital admissions ; hx of diabetes insipidus ; hx of COPD/CKD/CHF ; hx of C-Diff ; hx of moderate malnutrition ; pt does meet criteria for moderate malnutrition ; will start ONS    Nutrition Related Findings:    -I&Os (-3.3 L), 1+ edema, active BS, missing teeth, A&O x 4, muscle/fat wasting ; Wound Type: None       Current Nutrition Intake & Therapies:    Average Meal Intake: 51-75%     ADULT DIET; Regular    Anthropometric Measures:  Height: 5' 2\" (157.5 cm)  Ideal Body Weight (IBW): 110 lbs (50 kg)    Admission Body Weight: 173 lb (78.5 kg) (8/16, bedscale)  Current Body Weight: 173 lb (78.5 kg) (8/16, bedscale ; admission weight), 157.3 % IBW.     Current BMI (kg/m2): 31.6  Usual Body Weight:  (UTO ; EMR shows past weights of 141# bedscale on 7/28/23 and 146# bedscale on 7/6/23)                       BMI Categories: Obese Class 1 (BMI

## 2023-08-17 NOTE — CARE COORDINATION
8/17:  Update CM note:  Pt is dc today. Pt is set up with transportation with [facility] at [address] [place] [address] today.   Electronically signed by Herman Marie RN on 8/18/2023 at 9:29 AM

## 2023-08-17 NOTE — PLAN OF CARE
Problem: Chronic Conditions and Co-morbidities  Goal: Patient's chronic conditions and co-morbidity symptoms are monitored and maintained or improved  8/17/2023 1430 by Mary Oliveira RN  Outcome: Completed  8/17/2023 0747 by Mary Oliveira RN  Outcome: Progressing     Problem: Discharge Planning  Goal: Discharge to home or other facility with appropriate resources  Outcome: Completed     Problem: Safety - Adult  Goal: Free from fall injury  Outcome: Completed     Problem: Pain  Goal: Verbalizes/displays adequate comfort level or baseline comfort level  8/17/2023 1430 by Mary Oliveira RN  Outcome: Completed  8/17/2023 0747 by Mary Oliveira RN  Outcome: Progressing     Problem: Skin/Tissue Integrity  Goal: Absence of new skin breakdown  Description: 1. Monitor for areas of redness and/or skin breakdown  2. Assess vascular access sites hourly  3. Every 4-6 hours minimum:  Change oxygen saturation probe site  4. Every 4-6 hours:  If on nasal continuous positive airway pressure, respiratory therapy assess nares and determine need for appliance change or resting period.   Outcome: Completed     Problem: Nutrition Deficit:  Goal: Optimize nutritional status  Outcome: Completed

## 2023-08-17 NOTE — CARE COORDINATION
8/17:  Update CM Note:  Pt dc was held on 8/15 due to high 02 needs. Pt's dc plan is home with [facility]. Pt was active with [facility] & needs a PA-CM placed order. Pt needs 02 testing. Cm spoke with Da at [phone], she has a concentrator that goes up to 10L/NC. Ambulance form placed in envelope in soft chart. Per Pulmonary's note they recommended [facility]. - this is not an option pt doesn't have criteria for LTAC. [place] would be an option since they offer high 02 & have Dr. Cleaning Nicely but pt needs to be agreeable. Sw/KAREEN will continue to follow for dc planning. Electronically signed by Emerald Valle RN on 8/17/2023 at 9:18 AM    The Plan for Transition of Care is related to the following treatment goals: SNF/LTAC    The Patient and/or patient representative  was provided with a choice of provider and agrees   with the discharge plan. [x] Yes [] No    Freedom of choice list was provided with basic dialogue that supports the patient's individualized plan of care/goals, treatment preferences and shares the quality data associated with the providers.  [x] Yes [] No

## 2023-08-18 VITALS
WEIGHT: 174.3 LBS | SYSTOLIC BLOOD PRESSURE: 126 MMHG | OXYGEN SATURATION: 99 % | DIASTOLIC BLOOD PRESSURE: 80 MMHG | HEART RATE: 65 BPM | RESPIRATION RATE: 18 BRPM | BODY MASS INDEX: 32.07 KG/M2 | TEMPERATURE: 97.2 F | HEIGHT: 62 IN

## 2023-08-18 PROCEDURE — 94640 AIRWAY INHALATION TREATMENT: CPT

## 2023-08-18 PROCEDURE — 6360000002 HC RX W HCPCS: Performed by: INTERNAL MEDICINE

## 2023-08-18 PROCEDURE — 6370000000 HC RX 637 (ALT 250 FOR IP): Performed by: INTERNAL MEDICINE

## 2023-08-18 PROCEDURE — 2700000000 HC OXYGEN THERAPY PER DAY

## 2023-08-18 PROCEDURE — 2580000003 HC RX 258: Performed by: INTERNAL MEDICINE

## 2023-08-18 PROCEDURE — 2580000003 HC RX 258: Performed by: FAMILY MEDICINE

## 2023-08-18 PROCEDURE — 6370000000 HC RX 637 (ALT 250 FOR IP): Performed by: STUDENT IN AN ORGANIZED HEALTH CARE EDUCATION/TRAINING PROGRAM

## 2023-08-18 PROCEDURE — 94660 CPAP INITIATION&MGMT: CPT

## 2023-08-18 PROCEDURE — 6370000000 HC RX 637 (ALT 250 FOR IP): Performed by: FAMILY MEDICINE

## 2023-08-18 PROCEDURE — 6370000000 HC RX 637 (ALT 250 FOR IP)

## 2023-08-18 RX ORDER — SULFAMETHOXAZOLE AND TRIMETHOPRIM 800; 160 MG/1; MG/1
2 TABLET ORAL EVERY 8 HOURS SCHEDULED
Qty: 42 TABLET | Refills: 0 | Status: ON HOLD | OUTPATIENT
Start: 2023-08-18 | End: 2023-08-25

## 2023-08-18 RX ADMIN — DOCUSATE SODIUM 100 MG: 100 CAPSULE, LIQUID FILLED ORAL at 09:24

## 2023-08-18 RX ADMIN — DIPHENHYDRAMINE HYDROCHLORIDE 25 MG: 25 TABLET ORAL at 04:33

## 2023-08-18 RX ADMIN — FERROUS SULFATE TAB 325 MG (65 MG ELEMENTAL FE) 325 MG: 325 (65 FE) TAB at 09:24

## 2023-08-18 RX ADMIN — SODIUM CHLORIDE 125 MG: 9 INJECTION, SOLUTION INTRAVENOUS at 09:33

## 2023-08-18 RX ADMIN — APIXABAN 5 MG: 5 TABLET, FILM COATED ORAL at 09:24

## 2023-08-18 RX ADMIN — IPRATROPIUM BROMIDE AND ALBUTEROL SULFATE 1 DOSE: .5; 2.5 SOLUTION RESPIRATORY (INHALATION) at 13:06

## 2023-08-18 RX ADMIN — METOPROLOL SUCCINATE 50 MG: 50 TABLET, EXTENDED RELEASE ORAL at 09:24

## 2023-08-18 RX ADMIN — SULFAMETHOXAZOLE AND TRIMETHOPRIM 2 TABLET: 800; 160 TABLET ORAL at 13:14

## 2023-08-18 RX ADMIN — AMIODARONE HYDROCHLORIDE 200 MG: 200 TABLET ORAL at 09:24

## 2023-08-18 RX ADMIN — LEVETIRACETAM 500 MG: 500 TABLET, FILM COATED ORAL at 09:24

## 2023-08-18 RX ADMIN — PREDNISONE 5 MG: 5 TABLET ORAL at 09:24

## 2023-08-18 RX ADMIN — OXYCODONE HYDROCHLORIDE 5 MG: 5 TABLET ORAL at 04:33

## 2023-08-18 RX ADMIN — OXYCODONE HYDROCHLORIDE 5 MG: 5 TABLET ORAL at 09:31

## 2023-08-18 RX ADMIN — HYDRALAZINE HYDROCHLORIDE 25 MG: 25 TABLET, FILM COATED ORAL at 09:24

## 2023-08-18 RX ADMIN — FAMOTIDINE 20 MG: 20 TABLET, FILM COATED ORAL at 09:24

## 2023-08-18 RX ADMIN — Medication 10 ML: at 09:24

## 2023-08-18 RX ADMIN — DESMOPRESSIN ACETATE 200 MCG: 0.1 TABLET ORAL at 09:24

## 2023-08-18 RX ADMIN — SULFAMETHOXAZOLE AND TRIMETHOPRIM 2 TABLET: 800; 160 TABLET ORAL at 06:13

## 2023-08-18 RX ADMIN — DULOXETINE HYDROCHLORIDE 60 MG: 60 CAPSULE, DELAYED RELEASE ORAL at 09:24

## 2023-08-18 RX ADMIN — LEVOTHYROXINE SODIUM 75 MCG: 0.07 TABLET ORAL at 06:13

## 2023-08-18 ASSESSMENT — PAIN SCALES - GENERAL
PAINLEVEL_OUTOF10: 9
PAINLEVEL_OUTOF10: 8

## 2023-08-18 ASSESSMENT — PAIN DESCRIPTION - LOCATION: LOCATION: RIB CAGE

## 2023-08-18 ASSESSMENT — PAIN DESCRIPTION - DESCRIPTORS: DESCRIPTORS: SHARP;DISCOMFORT;ACHING

## 2023-08-18 ASSESSMENT — PAIN DESCRIPTION - ORIENTATION: ORIENTATION: MID

## 2023-08-18 ASSESSMENT — PAIN - FUNCTIONAL ASSESSMENT: PAIN_FUNCTIONAL_ASSESSMENT: PREVENTS OR INTERFERES SOME ACTIVE ACTIVITIES AND ADLS

## 2023-08-18 NOTE — DISCHARGE SUMMARY
congestion and small pleural effusions. There is perihilar nodularity most conspicuous on the left. 1. Suspicious for mild CHF. 2. Nodularity such as left perihilar location, suggest attention on follow-up CT imaging for the possibility of neoplasm. US RETROPERITONEAL COMPLETE    Result Date: 8/11/2023  EXAMINATION: RETROPERITONEAL ULTRASOUND OF THE KIDNEYS AND URINARY BLADDER 8/11/2023 COMPARISON: None HISTORY: ORDERING SYSTEM PROVIDED HISTORY: altagracia, r/o hydronephrosis TECHNOLOGIST PROVIDED HISTORY: Reason for exam:->altagracia, r/o hydronephrosis What reading provider will be dictating this exam?->CRC FINDINGS: Kidneys: The right kidney measures  10.0 cm in length and the left kidney measures 9.0 cm in length. Kidneys demonstrate normal cortical echogenicity. Bilateral hyperechoic kidneys. No evidence of hydronephrosis or intrarenal stones. Bladder: Unremarkable appearance of the bladder. Bilateral hyperechoic kidneys. Discharge Medications:      Medication List        START taking these medications      diphenhydrAMINE 25 MG tablet  Commonly known as: BENADRYL  Take 1 tablet by mouth every 6 hours as needed for Itching or Allergies (hold if lethargic)     polyvinyl alcohol 1.4 % ophthalmic solution  Commonly known as: LIQUIFILM TEARS  Place 1 drop into both eyes as needed for Dry Eyes     predniSONE 5 MG tablet  Commonly known as: DELTASONE  Take 1 tablet by mouth daily for 10 days     sulfamethoxazole-trimethoprim 800-160 MG per tablet  Commonly known as: Bactrim DS  Take 2 tablets by mouth every 8 hours for 7 days            CHANGE how you take these medications      desmopressin 0.1 MG tablet  Commonly known as: DDAVP  Take 2 tablets by mouth 2 times daily  What changed: Another medication with the same name was removed. Continue taking this medication, and follow the directions you see here.      famotidine 20 MG tablet  Commonly known as: PEPCID  Take 1 tablet by mouth daily  What changed: when to take this     hydrALAZINE 25 MG tablet  Commonly known as: APRESOLINE  Take 1 tablet by mouth 2 times daily  What changed:   when to take this  Another medication with the same name was removed. Continue taking this medication, and follow the directions you see here.             CONTINUE taking these medications      * albuterol sulfate  (90 Base) MCG/ACT inhaler  Commonly known as: Proventil HFA  Inhale 2 puffs into the lungs every 6 hours as needed for Wheezing or Shortness of Breath     * albuterol (2.5 MG/3ML) 0.083% nebulizer solution  Commonly known as: PROVENTIL  Take 3 mLs by nebulization every 6 hours as needed for Wheezing or Shortness of Breath     amiodarone 200 MG tablet  Commonly known as: CORDARONE  Take 1 tablet by mouth daily     docusate sodium 100 MG capsule  Commonly known as: COLACE  Take 1 capsule by mouth 2 times daily     DULoxetine 60 MG extended release capsule  Commonly known as: Cymbalta  Take 1 capsule by mouth daily     Eliquis 5 MG Tabs tablet  Generic drug: apixaban     ferrous sulfate 325 (65 Fe) MG tablet  Commonly known as: IRON 325  Take 1 tablet by mouth 2 times daily     fluticasone furoate-vilanterol 100-25 MCG/ACT inhaler  Commonly known as: BREO ELLIPTA  Inhale 1 puff into the lungs daily     furosemide 20 MG tablet  Commonly known as: LASIX  Take 2 tablets by mouth daily     guaiFENesin-dextromethorphan 100-10 MG/5ML syrup  Commonly known as: ROBITUSSIN DM  Take 5 mLs by mouth 3 times daily as needed for Cough     levETIRAcetam 500 MG tablet  Commonly known as: KEPPRA  Take 1 tablet by mouth 2 times daily     levothyroxine 75 MCG tablet  Commonly known as: SYNTHROID  Take 1 tablet by mouth daily     metoprolol succinate 50 MG extended release tablet  Commonly known as: TOPROL XL  Take 1 tablet by mouth 2 times daily     omega-3 acid ethyl esters 1 g capsule  Commonly known as: LOVAZA     oxyCODONE 5 MG immediate release tablet  Commonly known as: ROXICODONE  Take 1

## 2023-08-18 NOTE — CARE COORDINATION
8/18:  Update CM Note:  Pt is dc home today. CM set up transportation for 2pm with [facility]. [facility] is aware.   Electronically signed by Misty Mays RN on 8/18/2023 at 9:52 AM

## 2023-08-18 NOTE — PROGRESS NOTES
[facility] MD Zayra Lundy MD Mikal Canning, MD Andee Ruffini, ANDREAS Henley, MOLLY Rogers, ANDREAS  Progress Note    8/18/2023    SUBJECTIVE:     Azotemia improving with cr down to 1   Appear euvolemic with no JVD and no edema     PROBLEM LIST:    Principal Problem:    Acute on chronic respiratory failure (HCC)  Active Problems: Moderate protein-calorie malnutrition ([address])  Resolved Problems:    * No resolved hospital problems. *         DIET:    ADULT DIET; Regular  ADULT ORAL NUTRITION SUPPLEMENT; Lunch; Diabetic Oral Supplement  ADULT ORAL NUTRITION SUPPLEMENT; Dinner;  Other Oral Supplement; Gelatein     MEDS (scheduled):    ferric gluconate (FERRLECIT) IVPB  125 mg IntraVENous Daily    sulfamethoxazole-trimethoprim  2 tablet Oral 3 times per day    famotidine  20 mg Oral Daily    ipratropium 0.5 mg-albuterol 2.5 mg  1 Dose Inhalation Q4H WA RT    predniSONE  5 mg Oral Daily    amiodarone  200 mg Oral Daily    desmopressin  200 mcg Oral BID    docusate sodium  100 mg Oral BID    DULoxetine  60 mg Oral Daily    apixaban  5 mg Oral BID    ferrous sulfate  325 mg Oral BID WC    [Held by provider] furosemide  40 mg Oral Daily    hydrALAZINE  25 mg Oral BID    levETIRAcetam  500 mg Oral BID    levothyroxine  75 mcg Oral QAM AC    metoprolol succinate  50 mg Oral BID    [Held by provider] potassium chloride  20 mEq Oral Daily    sodium chloride flush  10 mL IntraVENous 2 times per day    budesonide  0.25 mg Nebulization BID RT    And    arformoterol tartrate  15 mcg Nebulization BID RT       MEDS (infusions):   dextrose      sodium chloride         MEDS (prn):  diphenhydrAMINE, sodium phosphate IVPB **OR** sodium phosphate IVPB **OR** sodium phosphate IVPB, glucose, dextrose bolus **OR** dextrose bolus, glucagon (rDNA), dextrose, albuterol, guaiFENesin-dextromethorphan, oxyCODONE, sodium chloride flush, sodium chloride, promethazine **OR** ondansetron,

## 2023-08-18 NOTE — PROGRESS NOTES
Date: 8/17/2023    Time: 10:00 PM    Patient Placed On BIPAP/CPAP/ Non-Invasive Ventilation? Yes    If no must comment. Facial area red/color change? No           If YES are Blister/Lesion present? No   If yes must notify nursing staff  BIPAP/CPAP skin barrier?   Yes    Skin barrier type:mepilexlite       Comments:        Kingston Ramirez RCP

## 2023-08-19 ENCOUNTER — APPOINTMENT (OUTPATIENT)
Dept: GENERAL RADIOLOGY | Age: 68
DRG: 870 | End: 2023-08-19
Payer: MEDICARE

## 2023-08-19 ENCOUNTER — APPOINTMENT (OUTPATIENT)
Dept: CT IMAGING | Age: 68
DRG: 870 | End: 2023-08-19
Payer: MEDICARE

## 2023-08-19 ENCOUNTER — TELEPHONE (OUTPATIENT)
Dept: OTHER | Facility: CLINIC | Age: 68
End: 2023-08-19

## 2023-08-19 ENCOUNTER — HOSPITAL ENCOUNTER (INPATIENT)
Age: 68
LOS: 25 days | Discharge: NURSING, REHAB OR LONG-TERM CARE | DRG: 870 | End: 2023-09-13
Attending: EMERGENCY MEDICINE | Admitting: FAMILY MEDICINE
Payer: MEDICARE

## 2023-08-19 DIAGNOSIS — R32 URINARY INCONTINENCE, UNSPECIFIED TYPE: ICD-10-CM

## 2023-08-19 DIAGNOSIS — J18.9 PNEUMONIA DUE TO INFECTIOUS ORGANISM, UNSPECIFIED LATERALITY, UNSPECIFIED PART OF LUNG: ICD-10-CM

## 2023-08-19 DIAGNOSIS — I46.9 CARDIAC ARREST (HCC): ICD-10-CM

## 2023-08-19 DIAGNOSIS — R06.02 SHORTNESS OF BREATH: Primary | ICD-10-CM

## 2023-08-19 DIAGNOSIS — R05.3 CHRONIC COUGH: ICD-10-CM

## 2023-08-19 DIAGNOSIS — R07.9 CHEST PAIN, UNSPECIFIED TYPE: ICD-10-CM

## 2023-08-19 LAB
AADO2: 338.1 MMHG
AADO2: 476.9 MMHG
AADO2: 558.8 MMHG
AADO2: 592.7 MMHG
ALBUMIN SERPL-MCNC: 3.6 G/DL (ref 3.5–5.2)
ALP SERPL-CCNC: 78 U/L (ref 35–104)
ALT SERPL-CCNC: 15 U/L (ref 0–32)
ANION GAP SERPL CALCULATED.3IONS-SCNC: 11 MMOL/L (ref 7–16)
AST SERPL-CCNC: 24 U/L (ref 0–31)
B PARAP IS1001 DNA NPH QL NAA+NON-PROBE: NOT DETECTED
B PERT DNA SPEC QL NAA+PROBE: NOT DETECTED
B.E.: -11 MMOL/L (ref -3–3)
B.E.: 0.1 MMOL/L (ref -3–3)
B.E.: 2.2 MMOL/L (ref -3–3)
B.E.: 2.7 MMOL/L (ref -3–3)
B.E.: 5.2 MMOL/L (ref -3–3)
BACTERIA URNS QL MICRO: ABNORMAL
BASOPHILS # BLD: 0.01 K/UL (ref 0–0.2)
BASOPHILS NFR BLD: 0 % (ref 0–2)
BILIRUB SERPL-MCNC: <0.2 MG/DL (ref 0–1.2)
BILIRUB UR QL STRIP: NEGATIVE
BILIRUB UR QL STRIP: NEGATIVE
BNP SERPL-MCNC: 2839 PG/ML (ref 0–125)
BUN SERPL-MCNC: 15 MG/DL (ref 6–23)
C PNEUM DNA NPH QL NAA+NON-PROBE: NOT DETECTED
CALCIUM SERPL-MCNC: 9.2 MG/DL (ref 8.6–10.2)
CHLORIDE SERPL-SCNC: 88 MMOL/L (ref 98–107)
CLARITY UR: CLEAR
CLARITY UR: CLEAR
CO2 SERPL-SCNC: 30 MMOL/L (ref 22–29)
COHB: 0.2 % (ref 0–1.5)
COHB: 0.3 % (ref 0–1.5)
COHB: 0.7 % (ref 0–1.5)
COLOR UR: YELLOW
COLOR UR: YELLOW
CREAT SERPL-MCNC: 1.4 MG/DL (ref 0.5–1)
CREAT UR-MCNC: 33.9 MG/DL (ref 29–226)
CRITICAL: ABNORMAL
DATE ANALYZED: ABNORMAL
DATE OF COLLECTION: ABNORMAL
EOSINOPHIL # BLD: 0.07 K/UL (ref 0.05–0.5)
EOSINOPHILS RELATIVE PERCENT: 2 % (ref 0–6)
EPI CELLS #/AREA URNS HPF: NORMAL /HPF
ERYTHROCYTE [DISTWIDTH] IN BLOOD BY AUTOMATED COUNT: 15.8 % (ref 11.5–15)
FIO2: 100 %
FIO2: 65 %
FLUAV RNA NPH QL NAA+NON-PROBE: NOT DETECTED
FLUBV RNA NPH QL NAA+NON-PROBE: NOT DETECTED
GFR SERPL CREATININE-BSD FRML MDRD: 43 ML/MIN/1.73M2
GLUCOSE BLD-MCNC: 76 MG/DL (ref 74–99)
GLUCOSE BLD-MCNC: 89 MG/DL (ref 74–99)
GLUCOSE SERPL-MCNC: 67 MG/DL (ref 74–99)
GLUCOSE UR STRIP-MCNC: NEGATIVE MG/DL
GLUCOSE UR STRIP-MCNC: NEGATIVE MG/DL
HADV DNA NPH QL NAA+NON-PROBE: NOT DETECTED
HCO3: 17.4 MMOL/L (ref 22–26)
HCO3: 27.8 MMOL/L (ref 22–26)
HCO3: 28 MMOL/L (ref 22–26)
HCO3: 28.5 MMOL/L (ref 22–26)
HCO3: 29.7 MMOL/L (ref 22–26)
HCOV 229E RNA NPH QL NAA+NON-PROBE: NOT DETECTED
HCOV HKU1 RNA NPH QL NAA+NON-PROBE: NOT DETECTED
HCOV NL63 RNA NPH QL NAA+NON-PROBE: NOT DETECTED
HCOV OC43 RNA NPH QL NAA+NON-PROBE: NOT DETECTED
HCT VFR BLD AUTO: 26.4 % (ref 34–48)
HGB BLD-MCNC: 8.4 G/DL (ref 11.5–15.5)
HGB UR QL STRIP.AUTO: ABNORMAL
HGB UR QL STRIP.AUTO: NEGATIVE
HHB: 1.7 % (ref 0–5)
HHB: 10.5 % (ref 0–5)
HHB: 5.9 % (ref 0–5)
HHB: 56.1 % (ref 0–5)
HHB: 7.7 % (ref 0–5)
HMPV RNA NPH QL NAA+NON-PROBE: NOT DETECTED
HPIV1 RNA NPH QL NAA+NON-PROBE: NOT DETECTED
HPIV2 RNA NPH QL NAA+NON-PROBE: NOT DETECTED
HPIV3 RNA NPH QL NAA+NON-PROBE: NOT DETECTED
HPIV4 RNA NPH QL NAA+NON-PROBE: NOT DETECTED
IMM GRANULOCYTES # BLD AUTO: <0.03 K/UL (ref 0–0.58)
IMM GRANULOCYTES NFR BLD: 1 % (ref 0–5)
INR PPP: 1.5
KETONES UR STRIP-MCNC: NEGATIVE MG/DL
KETONES UR STRIP-MCNC: NEGATIVE MG/DL
LAB: ABNORMAL
LACTATE BLDV-SCNC: 1.3 MMOL/L (ref 0.5–1.9)
LEUKOCYTE ESTERASE UR QL STRIP: NEGATIVE
LEUKOCYTE ESTERASE UR QL STRIP: NEGATIVE
LYMPHOCYTES NFR BLD: 0.42 K/UL (ref 1.5–4)
LYMPHOCYTES RELATIVE PERCENT: 12 % (ref 20–42)
Lab: ABNORMAL
M PNEUMO DNA NPH QL NAA+NON-PROBE: NOT DETECTED
MAGNESIUM SERPL-MCNC: 1.9 MG/DL (ref 1.6–2.6)
MCH RBC QN AUTO: 24.9 PG (ref 26–35)
MCHC RBC AUTO-ENTMCNC: 31.8 G/DL (ref 32–34.5)
MCV RBC AUTO: 78.3 FL (ref 80–99.9)
METHB: 0.3 % (ref 0–1.5)
METHB: 0.5 % (ref 0–1.5)
METHB: 0.6 % (ref 0–1.5)
MODE: ABNORMAL
MODE: ABNORMAL
MODE: AC
MONOCYTES NFR BLD: 0.49 K/UL (ref 0.1–0.95)
MONOCYTES NFR BLD: 14 % (ref 2–12)
NEUTROPHILS NFR BLD: 72 % (ref 43–80)
NEUTS SEG NFR BLD: 2.57 K/UL (ref 1.8–7.3)
NITRITE UR QL STRIP: NEGATIVE
NITRITE UR QL STRIP: NEGATIVE
O2 CONTENT: 11.2 ML/DL
O2 CONTENT: 11.9 ML/DL
O2 CONTENT: 12.4 ML/DL
O2 CONTENT: 13.5 ML/DL
O2 CONTENT: 5.6 ML/DL
O2 SATURATION: 43.5 % (ref 92–98.5)
O2 SATURATION: 89.4 % (ref 92–98.5)
O2 SATURATION: 92.2 % (ref 92–98.5)
O2 SATURATION: 94.1 % (ref 92–98.5)
O2 SATURATION: 98.3 % (ref 92–98.5)
O2HB: 43.2 % (ref 94–97)
O2HB: 88.2 % (ref 94–97)
O2HB: 91.5 % (ref 94–97)
O2HB: 93.6 % (ref 94–97)
O2HB: 97.4 % (ref 94–97)
OPERATOR ID: 1210
OPERATOR ID: 1868
OPERATOR ID: 7296
OSMOLALITY SERPL: 289 MOSM/KG (ref 285–310)
OSMOLALITY UR: 405 MOSM/KG (ref 300–900)
PARTIAL THROMBOPLASTIN TIME: 39.9 SEC (ref 24.5–35.1)
PATIENT TEMP: 37 C
PCO2: 43.8 MMHG (ref 35–45)
PCO2: 46.5 MMHG (ref 35–45)
PCO2: 50.8 MMHG (ref 35–45)
PCO2: 53.6 MMHG (ref 35–45)
PCO2: 63 MMHG (ref 35–45)
PEEP/CPAP: 6 CMH2O
PEEP/CPAP: 6 CMH2O
PEEP/CPAP: 8 CMH2O
PFO2: 0.32 MMHG/%
PFO2: 0.76 MMHG/%
PFO2: 0.95 MMHG/%
PFO2: 1.6 MMHG/%
PH BLOOD GAS: 7.15 (ref 7.35–7.45)
PH BLOOD GAS: 7.26 (ref 7.35–7.45)
PH BLOOD GAS: 7.34 (ref 7.35–7.45)
PH BLOOD GAS: 7.4 (ref 7.35–7.45)
PH BLOOD GAS: 7.45 (ref 7.35–7.45)
PH UR STRIP: 7 [PH] (ref 5–9)
PH UR STRIP: 7 [PH] (ref 5–9)
PLATELET # BLD AUTO: 234 K/UL (ref 130–450)
PMV BLD AUTO: 10.4 FL (ref 7–12)
PO2: 160.3 MMHG (ref 75–100)
PO2: 32.3 MMHG (ref 75–100)
PO2: 61.5 MMHG (ref 75–100)
PO2: 75.6 MMHG (ref 75–100)
PO2: 81.4 MMHG (ref 75–100)
POTASSIUM SERPL-SCNC: 4.7 MMOL/L (ref 3.5–5)
POTASSIUM SERPL-SCNC: 4.97 MMOL/L (ref 3.5–5)
PROCALCITONIN SERPL-MCNC: 0.17 NG/ML (ref 0–0.08)
PROT SERPL-MCNC: 7 G/DL (ref 6.4–8.3)
PROT UR STRIP-MCNC: 30 MG/DL
PROT UR STRIP-MCNC: ABNORMAL MG/DL
PROTHROMBIN TIME: 16.3 SEC (ref 9.3–12.4)
RBC # BLD AUTO: 3.37 M/UL (ref 3.5–5.5)
RBC # BLD: ABNORMAL 10*6/UL
RBC #/AREA URNS HPF: ABNORMAL /HPF
RBC #/AREA URNS HPF: NORMAL /HPF
RI(T): 18.35
RI(T): 2.98
RI(T): 5.5
RI(T): 7.39
RR MECHANICAL: 12 B/MIN
RR MECHANICAL: 18 B/MIN
RR MECHANICAL: 18 B/MIN
RSV RNA NPH QL NAA+NON-PROBE: NOT DETECTED
RV+EV RNA NPH QL NAA+NON-PROBE: NOT DETECTED
SARS-COV-2 RNA NPH QL NAA+NON-PROBE: NOT DETECTED
SODIUM SERPL-SCNC: 129 MMOL/L (ref 132–146)
SODIUM UR-SCNC: 103 MMOL/L
SOURCE, BLOOD GAS: ABNORMAL
SP GR UR STRIP: 1.01 (ref 1–1.03)
SP GR UR STRIP: 1.02 (ref 1–1.03)
SPECIMEN DESCRIPTION: NORMAL
THB: 9 G/DL (ref 11.5–16.5)
THB: 9.2 G/DL (ref 11.5–16.5)
THB: 9.2 G/DL (ref 11.5–16.5)
THB: 9.3 G/DL (ref 11.5–16.5)
THB: 9.6 G/DL (ref 11.5–16.5)
TIME ANALYZED: 1122
TIME ANALYZED: 1136
TIME ANALYZED: 1427
TIME ANALYZED: 254
TIME ANALYZED: 837
TROPONIN I SERPL HS-MCNC: 22 NG/L (ref 0–9)
TROPONIN I SERPL HS-MCNC: 23 NG/L (ref 0–9)
UROBILINOGEN UR STRIP-ACNC: 0.2 EU/DL (ref 0–1)
UROBILINOGEN UR STRIP-ACNC: 0.2 EU/DL (ref 0–1)
VT MECHANICAL: 350 ML
VT MECHANICAL: 400 ML
VT MECHANICAL: 400 ML
WBC # BLD: ABNORMAL 10*3/UL
WBC #/AREA URNS HPF: ABNORMAL /HPF
WBC #/AREA URNS HPF: NORMAL /HPF
WBC OTHER # BLD: 3.6 K/UL (ref 4.5–11.5)

## 2023-08-19 PROCEDURE — 83935 ASSAY OF URINE OSMOLALITY: CPT

## 2023-08-19 PROCEDURE — 85025 COMPLETE CBC W/AUTO DIFF WBC: CPT

## 2023-08-19 PROCEDURE — 2580000003 HC RX 258: Performed by: STUDENT IN AN ORGANIZED HEALTH CARE EDUCATION/TRAINING PROGRAM

## 2023-08-19 PROCEDURE — 6360000004 HC RX CONTRAST MEDICATION: Performed by: RADIOLOGY

## 2023-08-19 PROCEDURE — 99291 CRITICAL CARE FIRST HOUR: CPT | Performed by: INTERNAL MEDICINE

## 2023-08-19 PROCEDURE — 83880 ASSAY OF NATRIURETIC PEPTIDE: CPT

## 2023-08-19 PROCEDURE — 6360000002 HC RX W HCPCS: Performed by: INTERNAL MEDICINE

## 2023-08-19 PROCEDURE — 96365 THER/PROPH/DIAG IV INF INIT: CPT

## 2023-08-19 PROCEDURE — 84145 PROCALCITONIN (PCT): CPT

## 2023-08-19 PROCEDURE — 84484 ASSAY OF TROPONIN QUANT: CPT

## 2023-08-19 PROCEDURE — 6360000002 HC RX W HCPCS: Performed by: STUDENT IN AN ORGANIZED HEALTH CARE EDUCATION/TRAINING PROGRAM

## 2023-08-19 PROCEDURE — 5A1955Z RESPIRATORY VENTILATION, GREATER THAN 96 CONSECUTIVE HOURS: ICD-10-PCS | Performed by: FAMILY MEDICINE

## 2023-08-19 PROCEDURE — 85730 THROMBOPLASTIN TIME PARTIAL: CPT

## 2023-08-19 PROCEDURE — 87040 BLOOD CULTURE FOR BACTERIA: CPT

## 2023-08-19 PROCEDURE — 96375 TX/PRO/DX INJ NEW DRUG ADDON: CPT

## 2023-08-19 PROCEDURE — 87449 NOS EACH ORGANISM AG IA: CPT

## 2023-08-19 PROCEDURE — 82962 GLUCOSE BLOOD TEST: CPT

## 2023-08-19 PROCEDURE — A4216 STERILE WATER/SALINE, 10 ML: HCPCS

## 2023-08-19 PROCEDURE — 2500000003 HC RX 250 WO HCPCS: Performed by: STUDENT IN AN ORGANIZED HEALTH CARE EDUCATION/TRAINING PROGRAM

## 2023-08-19 PROCEDURE — 83930 ASSAY OF BLOOD OSMOLALITY: CPT

## 2023-08-19 PROCEDURE — 2500000003 HC RX 250 WO HCPCS: Performed by: INTERNAL MEDICINE

## 2023-08-19 PROCEDURE — 99285 EMERGENCY DEPT VISIT HI MDM: CPT

## 2023-08-19 PROCEDURE — 74018 RADEX ABDOMEN 1 VIEW: CPT

## 2023-08-19 PROCEDURE — 71045 X-RAY EXAM CHEST 1 VIEW: CPT

## 2023-08-19 PROCEDURE — C9113 INJ PANTOPRAZOLE SODIUM, VIA: HCPCS

## 2023-08-19 PROCEDURE — 94664 DEMO&/EVAL PT USE INHALER: CPT

## 2023-08-19 PROCEDURE — 94002 VENT MGMT INPAT INIT DAY: CPT

## 2023-08-19 PROCEDURE — 85610 PROTHROMBIN TIME: CPT

## 2023-08-19 PROCEDURE — 80053 COMPREHEN METABOLIC PANEL: CPT

## 2023-08-19 PROCEDURE — 6360000002 HC RX W HCPCS

## 2023-08-19 PROCEDURE — 83605 ASSAY OF LACTIC ACID: CPT

## 2023-08-19 PROCEDURE — 31500 INSERT EMERGENCY AIRWAY: CPT

## 2023-08-19 PROCEDURE — 0202U NFCT DS 22 TRGT SARS-COV-2: CPT

## 2023-08-19 PROCEDURE — 81001 URINALYSIS AUTO W/SCOPE: CPT

## 2023-08-19 PROCEDURE — 2580000003 HC RX 258

## 2023-08-19 PROCEDURE — 93005 ELECTROCARDIOGRAM TRACING: CPT | Performed by: EMERGENCY MEDICINE

## 2023-08-19 PROCEDURE — 5A1945Z RESPIRATORY VENTILATION, 24-96 CONSECUTIVE HOURS: ICD-10-PCS | Performed by: FAMILY MEDICINE

## 2023-08-19 PROCEDURE — 96374 THER/PROPH/DIAG INJ IV PUSH: CPT

## 2023-08-19 PROCEDURE — 83735 ASSAY OF MAGNESIUM: CPT

## 2023-08-19 PROCEDURE — 87086 URINE CULTURE/COLONY COUNT: CPT

## 2023-08-19 PROCEDURE — 6370000000 HC RX 637 (ALT 250 FOR IP): Performed by: EMERGENCY MEDICINE

## 2023-08-19 PROCEDURE — 6370000000 HC RX 637 (ALT 250 FOR IP)

## 2023-08-19 PROCEDURE — 2580000003 HC RX 258: Performed by: EMERGENCY MEDICINE

## 2023-08-19 PROCEDURE — 2000000000 HC ICU R&B

## 2023-08-19 PROCEDURE — 82805 BLOOD GASES W/O2 SATURATION: CPT

## 2023-08-19 PROCEDURE — 0BH17EZ INSERTION OF ENDOTRACHEAL AIRWAY INTO TRACHEA, VIA NATURAL OR ARTIFICIAL OPENING: ICD-10-PCS | Performed by: FAMILY MEDICINE

## 2023-08-19 PROCEDURE — 71275 CT ANGIOGRAPHY CHEST: CPT

## 2023-08-19 PROCEDURE — 87205 SMEAR GRAM STAIN: CPT

## 2023-08-19 PROCEDURE — 87107 FUNGI IDENTIFICATION MOLD: CPT

## 2023-08-19 PROCEDURE — 74177 CT ABD & PELVIS W/CONTRAST: CPT

## 2023-08-19 PROCEDURE — 94640 AIRWAY INHALATION TREATMENT: CPT

## 2023-08-19 PROCEDURE — 87899 AGENT NOS ASSAY W/OPTIC: CPT

## 2023-08-19 PROCEDURE — 36556 INSERT NON-TUNNEL CV CATH: CPT

## 2023-08-19 PROCEDURE — 84300 ASSAY OF URINE SODIUM: CPT

## 2023-08-19 PROCEDURE — 84132 ASSAY OF SERUM POTASSIUM: CPT

## 2023-08-19 PROCEDURE — 87070 CULTURE OTHR SPECIMN AEROBIC: CPT

## 2023-08-19 PROCEDURE — 99223 1ST HOSP IP/OBS HIGH 75: CPT | Performed by: FAMILY MEDICINE

## 2023-08-19 PROCEDURE — 82570 ASSAY OF URINE CREATININE: CPT

## 2023-08-19 PROCEDURE — 6360000002 HC RX W HCPCS: Performed by: EMERGENCY MEDICINE

## 2023-08-19 PROCEDURE — 2580000003 HC RX 258: Performed by: INTERNAL MEDICINE

## 2023-08-19 PROCEDURE — 70450 CT HEAD/BRAIN W/O DYE: CPT

## 2023-08-19 RX ORDER — POTASSIUM CHLORIDE 29.8 MG/ML
40 INJECTION INTRAVENOUS ONCE
Status: DISCONTINUED | OUTPATIENT
Start: 2023-08-19 | End: 2023-08-19

## 2023-08-19 RX ORDER — SENNOSIDES A AND B 8.6 MG/1
1 TABLET, FILM COATED ORAL NIGHTLY
Status: DISCONTINUED | OUTPATIENT
Start: 2023-08-19 | End: 2023-09-13 | Stop reason: HOSPADM

## 2023-08-19 RX ORDER — LEVETIRACETAM 500 MG/1
500 TABLET ORAL 2 TIMES DAILY
Status: DISCONTINUED | OUTPATIENT
Start: 2023-08-19 | End: 2023-08-25

## 2023-08-19 RX ORDER — ALBUTEROL SULFATE 2.5 MG/3ML
2.5 SOLUTION RESPIRATORY (INHALATION) EVERY 6 HOURS PRN
Status: DISCONTINUED | OUTPATIENT
Start: 2023-08-19 | End: 2023-09-13 | Stop reason: HOSPADM

## 2023-08-19 RX ORDER — ONDANSETRON 2 MG/ML
4 INJECTION INTRAMUSCULAR; INTRAVENOUS EVERY 6 HOURS PRN
Status: DISCONTINUED | OUTPATIENT
Start: 2023-08-19 | End: 2023-09-13 | Stop reason: HOSPADM

## 2023-08-19 RX ORDER — MORPHINE SULFATE 2 MG/ML
2 INJECTION, SOLUTION INTRAMUSCULAR; INTRAVENOUS
Status: DISPENSED | OUTPATIENT
Start: 2023-08-19 | End: 2023-08-21

## 2023-08-19 RX ORDER — HYDRALAZINE HYDROCHLORIDE 25 MG/1
25 TABLET, FILM COATED ORAL 2 TIMES DAILY
Status: DISCONTINUED | OUTPATIENT
Start: 2023-08-19 | End: 2023-09-13 | Stop reason: HOSPADM

## 2023-08-19 RX ORDER — FENTANYL CITRATE-0.9 % NACL/PF 10 MCG/ML
25-200 PLASTIC BAG, INJECTION (ML) INTRAVENOUS CONTINUOUS
Status: DISCONTINUED | OUTPATIENT
Start: 2023-08-19 | End: 2023-08-20

## 2023-08-19 RX ORDER — AMIODARONE HYDROCHLORIDE 200 MG/1
200 TABLET ORAL DAILY
Status: DISCONTINUED | OUTPATIENT
Start: 2023-08-19 | End: 2023-09-13 | Stop reason: HOSPADM

## 2023-08-19 RX ORDER — ACETAMINOPHEN 325 MG/1
650 TABLET ORAL EVERY 6 HOURS PRN
Status: DISCONTINUED | OUTPATIENT
Start: 2023-08-19 | End: 2023-09-13 | Stop reason: HOSPADM

## 2023-08-19 RX ORDER — SODIUM CHLORIDE 0.9 % (FLUSH) 0.9 %
10 SYRINGE (ML) INJECTION
Status: ACTIVE | OUTPATIENT
Start: 2023-08-19 | End: 2023-08-20

## 2023-08-19 RX ORDER — FUROSEMIDE 40 MG/1
40 TABLET ORAL DAILY
Status: DISCONTINUED | OUTPATIENT
Start: 2023-08-19 | End: 2023-08-19

## 2023-08-19 RX ORDER — ALBUTEROL SULFATE 90 UG/1
2 AEROSOL, METERED RESPIRATORY (INHALATION) EVERY 6 HOURS PRN
Status: DISCONTINUED | OUTPATIENT
Start: 2023-08-19 | End: 2023-08-19 | Stop reason: SDUPTHER

## 2023-08-19 RX ORDER — FUROSEMIDE 10 MG/ML
40 INJECTION INTRAMUSCULAR; INTRAVENOUS DAILY
Status: DISCONTINUED | OUTPATIENT
Start: 2023-08-19 | End: 2023-08-21

## 2023-08-19 RX ORDER — FENTANYL CITRATE 50 UG/ML
50 INJECTION, SOLUTION INTRAMUSCULAR; INTRAVENOUS
Status: COMPLETED | OUTPATIENT
Start: 2023-08-19 | End: 2023-08-20

## 2023-08-19 RX ORDER — SODIUM CHLORIDE 0.9 % (FLUSH) 0.9 %
5-40 SYRINGE (ML) INJECTION EVERY 12 HOURS SCHEDULED
Status: DISCONTINUED | OUTPATIENT
Start: 2023-08-19 | End: 2023-09-13 | Stop reason: HOSPADM

## 2023-08-19 RX ORDER — ONDANSETRON 4 MG/1
4 TABLET, ORALLY DISINTEGRATING ORAL EVERY 8 HOURS PRN
Status: DISCONTINUED | OUTPATIENT
Start: 2023-08-19 | End: 2023-09-13 | Stop reason: HOSPADM

## 2023-08-19 RX ORDER — POLYETHYLENE GLYCOL 3350 17 G/17G
17 POWDER, FOR SOLUTION ORAL DAILY
Status: DISCONTINUED | OUTPATIENT
Start: 2023-08-19 | End: 2023-09-13 | Stop reason: HOSPADM

## 2023-08-19 RX ORDER — METOPROLOL SUCCINATE 50 MG/1
50 TABLET, EXTENDED RELEASE ORAL 2 TIMES DAILY
Status: DISCONTINUED | OUTPATIENT
Start: 2023-08-19 | End: 2023-08-25

## 2023-08-19 RX ORDER — NOREPINEPHRINE BITARTRATE 0.06 MG/ML
1-100 INJECTION, SOLUTION INTRAVENOUS CONTINUOUS
Status: DISCONTINUED | OUTPATIENT
Start: 2023-08-19 | End: 2023-08-26

## 2023-08-19 RX ORDER — DEXTROSE MONOHYDRATE 100 MG/ML
INJECTION, SOLUTION INTRAVENOUS CONTINUOUS PRN
Status: DISCONTINUED | OUTPATIENT
Start: 2023-08-19 | End: 2023-09-13 | Stop reason: HOSPADM

## 2023-08-19 RX ORDER — SODIUM CHLORIDE 0.9 % (FLUSH) 0.9 %
5-40 SYRINGE (ML) INJECTION PRN
Status: DISCONTINUED | OUTPATIENT
Start: 2023-08-19 | End: 2023-09-13 | Stop reason: HOSPADM

## 2023-08-19 RX ORDER — IPRATROPIUM BROMIDE AND ALBUTEROL SULFATE 2.5; .5 MG/3ML; MG/3ML
3 SOLUTION RESPIRATORY (INHALATION) ONCE
Status: COMPLETED | OUTPATIENT
Start: 2023-08-19 | End: 2023-08-19

## 2023-08-19 RX ORDER — PANTOPRAZOLE SODIUM 40 MG/10ML
40 INJECTION, POWDER, LYOPHILIZED, FOR SOLUTION INTRAVENOUS DAILY
Status: DISCONTINUED | OUTPATIENT
Start: 2023-08-19 | End: 2023-08-19 | Stop reason: SDUPTHER

## 2023-08-19 RX ORDER — SODIUM CHLORIDE 9 MG/ML
INJECTION, SOLUTION INTRAVENOUS PRN
Status: DISCONTINUED | OUTPATIENT
Start: 2023-08-19 | End: 2023-08-23 | Stop reason: SDUPTHER

## 2023-08-19 RX ORDER — DESMOPRESSIN ACETATE 0.1 MG/1
200 TABLET ORAL 2 TIMES DAILY
Status: DISCONTINUED | OUTPATIENT
Start: 2023-08-19 | End: 2023-09-11

## 2023-08-19 RX ORDER — ACETAMINOPHEN 650 MG/1
650 SUPPOSITORY RECTAL EVERY 6 HOURS PRN
Status: DISCONTINUED | OUTPATIENT
Start: 2023-08-19 | End: 2023-09-13 | Stop reason: HOSPADM

## 2023-08-19 RX ORDER — ARFORMOTEROL TARTRATE 15 UG/2ML
15 SOLUTION RESPIRATORY (INHALATION)
Status: DISCONTINUED | OUTPATIENT
Start: 2023-08-19 | End: 2023-09-13 | Stop reason: HOSPADM

## 2023-08-19 RX ORDER — BUDESONIDE 0.5 MG/2ML
500 INHALANT ORAL
Status: DISCONTINUED | OUTPATIENT
Start: 2023-08-19 | End: 2023-09-13 | Stop reason: HOSPADM

## 2023-08-19 RX ORDER — LEVOTHYROXINE SODIUM 0.07 MG/1
75 TABLET ORAL DAILY
Status: DISCONTINUED | OUTPATIENT
Start: 2023-08-19 | End: 2023-09-13 | Stop reason: HOSPADM

## 2023-08-19 RX ADMIN — BUDESONIDE INHALATION 500 MCG: 0.5 SUSPENSION RESPIRATORY (INHALATION) at 14:32

## 2023-08-19 RX ADMIN — IOPAMIDOL 90 ML: 755 INJECTION, SOLUTION INTRAVENOUS at 09:34

## 2023-08-19 RX ADMIN — SODIUM BICARBONATE 50 MEQ: 84 INJECTION INTRAVENOUS at 08:47

## 2023-08-19 RX ADMIN — APIXABAN 5 MG: 5 TABLET, FILM COATED ORAL at 15:54

## 2023-08-19 RX ADMIN — Medication 5 MCG/MIN: at 09:44

## 2023-08-19 RX ADMIN — METHYLPREDNISOLONE SODIUM SUCCINATE 40 MG: 40 INJECTION, POWDER, LYOPHILIZED, FOR SOLUTION INTRAMUSCULAR; INTRAVENOUS at 19:54

## 2023-08-19 RX ADMIN — LEVOTHYROXINE SODIUM 75 MCG: 0.07 TABLET ORAL at 15:54

## 2023-08-19 RX ADMIN — FENTANYL CITRATE 50 MCG: 0.05 INJECTION, SOLUTION INTRAMUSCULAR; INTRAVENOUS at 18:10

## 2023-08-19 RX ADMIN — AMIODARONE HYDROCHLORIDE 200 MG: 200 TABLET ORAL at 15:54

## 2023-08-19 RX ADMIN — POLYETHYLENE GLYCOL 3350 17 GRAM ORAL POWDER PACKET 17 G: at 15:54

## 2023-08-19 RX ADMIN — METHYLPREDNISOLONE SODIUM SUCCINATE 125 MG: 125 INJECTION, POWDER, LYOPHILIZED, FOR SOLUTION INTRAMUSCULAR; INTRAVENOUS at 03:55

## 2023-08-19 RX ADMIN — ARFORMOTEROL TARTRATE 15 MCG: 15 SOLUTION RESPIRATORY (INHALATION) at 14:32

## 2023-08-19 RX ADMIN — BUDESONIDE INHALATION 500 MCG: 0.5 SUSPENSION RESPIRATORY (INHALATION) at 19:32

## 2023-08-19 RX ADMIN — LEVETIRACETAM 500 MG: 500 TABLET, FILM COATED ORAL at 19:54

## 2023-08-19 RX ADMIN — LEVETIRACETAM 500 MG: 500 TABLET, FILM COATED ORAL at 15:54

## 2023-08-19 RX ADMIN — SODIUM CHLORIDE 100 MG: 9 INJECTION, SOLUTION INTRAVENOUS at 11:46

## 2023-08-19 RX ADMIN — Medication 50 MCG/HR: at 18:43

## 2023-08-19 RX ADMIN — FENTANYL CITRATE 50 MCG: 0.05 INJECTION, SOLUTION INTRAMUSCULAR; INTRAVENOUS at 07:01

## 2023-08-19 RX ADMIN — Medication 10 ML: at 20:01

## 2023-08-19 RX ADMIN — ARFORMOTEROL TARTRATE 15 MCG: 15 SOLUTION RESPIRATORY (INHALATION) at 19:32

## 2023-08-19 RX ADMIN — FUROSEMIDE 40 MG: 10 INJECTION, SOLUTION INTRAMUSCULAR; INTRAVENOUS at 15:55

## 2023-08-19 RX ADMIN — CEFEPIME 2000 MG: 2 INJECTION, POWDER, FOR SOLUTION INTRAVENOUS at 11:09

## 2023-08-19 RX ADMIN — FENTANYL CITRATE 50 MCG: 0.05 INJECTION, SOLUTION INTRAMUSCULAR; INTRAVENOUS at 15:55

## 2023-08-19 RX ADMIN — STANDARDIZED SENNA CONCENTRATE 8.6 MG: 8.6 TABLET ORAL at 19:54

## 2023-08-19 RX ADMIN — APIXABAN 5 MG: 5 TABLET, FILM COATED ORAL at 19:54

## 2023-08-19 RX ADMIN — SODIUM CHLORIDE, PRESERVATIVE FREE 40 MG: 5 INJECTION INTRAVENOUS at 15:54

## 2023-08-19 RX ADMIN — IPRATROPIUM BROMIDE AND ALBUTEROL SULFATE 3 DOSE: .5; 2.5 SOLUTION RESPIRATORY (INHALATION) at 02:59

## 2023-08-19 ASSESSMENT — PULMONARY FUNCTION TESTS
PIF_VALUE: 15
PIF_VALUE: 21
PIF_VALUE: 23
PIF_VALUE: 27
PIF_VALUE: 24
PIF_VALUE: 21
PIF_VALUE: 25
PIF_VALUE: 22
PIF_VALUE: 31
PIF_VALUE: 23
PIF_VALUE: 20
PIF_VALUE: 19

## 2023-08-19 ASSESSMENT — PAIN SCALES - GENERAL
PAINLEVEL_OUTOF10: 4
PAINLEVEL_OUTOF10: 0
PAINLEVEL_OUTOF10: 4
PAINLEVEL_OUTOF10: 7
PAINLEVEL_OUTOF10: 4

## 2023-08-19 ASSESSMENT — PAIN - FUNCTIONAL ASSESSMENT: PAIN_FUNCTIONAL_ASSESSMENT: 0-10

## 2023-08-19 ASSESSMENT — PAIN DESCRIPTION - LOCATION: LOCATION: CHEST

## 2023-08-19 NOTE — ED NOTES
Patient with increased confusion, not following commands well. Spoke to ED provider regarding patient.       Jatinder Alva RN  08/19/23 9550

## 2023-08-19 NOTE — ED NOTES
Purwick applied. Pt placed in gown and blankets applied to pt. Pt clothing, shoes, phone and  placed in belongings bag and labeled.       Vickey Arredondo RN  08/19/23 4142

## 2023-08-19 NOTE — PLAN OF CARE
Problem: Chronic Conditions and Co-morbidities  Goal: Patient's chronic conditions and co-morbidity symptoms are monitored and maintained or improved  Outcome: Progressing     Problem: Safety - Adult  Goal: Free from fall injury  Outcome: Progressing     Problem: Pain  Goal: Verbalizes/displays adequate comfort level or baseline comfort level  Outcome: Progressing     Problem: Skin/Tissue Integrity  Goal: Absence of new skin breakdown  Description: 1. Monitor for areas of redness and/or skin breakdown  2. Assess vascular access sites hourly  3. Every 4-6 hours minimum:  Change oxygen saturation probe site  4. Every 4-6 hours:  If on nasal continuous positive airway pressure, respiratory therapy assess nares and determine need for appliance change or resting period.   Outcome: Progressing     Problem: ABCDS Injury Assessment  Goal: Absence of physical injury  Outcome: Progressing     Problem: Neurosensory - Adult  Goal: Achieves stable or improved neurological status  Outcome: Progressing     Problem: Respiratory - Adult  Goal: Achieves optimal ventilation and oxygenation  Outcome: Progressing     Problem: Skin/Tissue Integrity - Adult  Goal: Skin integrity remains intact  Outcome: Progressing

## 2023-08-19 NOTE — TELEPHONE ENCOUNTER
Writer contacted ED provider to inform of 30 day readmission risk. ED provider informed writer of readmission.       Call Back: If you need to call back to inform of disposition you can contact me at [phone]

## 2023-08-19 NOTE — H&P
[facility]  Family Medicine Residency Program  History and Physical    Patient:  Arelis Duarte 78 y.o. female MRN: [id number]     Date of Service: 8/19/2023    Hospital Day: 1    History of Present Illness   The patient is a 78 y.o. female with a past medical history of pulmonary sarcoidosis, atrial fibrillation on Eliquis, pulmonary hypertension, diabetes insipidus, hypothyroidism, hypertension, chronic kidney disease stage IIIa and combined systolic/diastolic heart failure who presents to the emergency department for shortness of breath. Patient was recently discharged from [facility] (08/18/23) due to acute hypoxic respiratory failure. Patient has had numerous hospital admissions for the same complaint. Based on discharge summary she was offered discharge to [facility]/SNF, [place], but declined and was eventually discharged home with Bactrim. Patient to remain full code per family. Agreed with facility placement at discharge due to multiple hospital admissions in the last months. Emergency department:  Hypotensive, tachycardic and hypoxic. She did decompensate and ended up in PEA. 2 rounds of CPR were performed and ROSC achieved. Intubated, solumedrol given and levophed started. NG tube placed. Labs remarkable for hyponatremia, hypochloremia, acute kidney injury, hypoglycemia, Pro-BNP 2.839 (slightly increased), troponin 22 --> 23, leukopenia, chronic microcytic anemia with iron deficiency  Urine analysis positive for hematuria and negative for UTI  Respiratory panel negative  EKG showed normal sinus rhythm, possible left atrial enlargement, normal QTc and negative for acute ischemic changes  Initial ABG remarkable for compensated respiratory acidosis. Repeat ABG after the acute hypoxic respiratory failure remarkable for decompensated respiratory acidosis. Blood and urine cultures pending  Initial chest xray remarkable for bibasilar atelectasis or infiltrate.  Repeat chest xray 8/19/2023  EXAMINATION: ONE XRAY VIEW OF THE CHEST 8/19/2023 2:37 am COMPARISON: None. HISTORY: ORDERING SYSTEM PROVIDED HISTORY: SOB TECHNOLOGIST PROVIDED HISTORY: Reason for exam:->SOB What reading provider will be dictating this exam?->CRC     Cardiomediastinal silhouette appears enlarged. Prominent hilar/perihilar structures probably reflecting prominent pulmonary vessels however cannot entirely exclude nodule/mass. Would advise a follow-up PA chest radiograph. Mild bibasilar atelectasis or infiltrate. No gross pneumothorax. Deformity of the right humeral head. XR CHEST PORTABLE    Result Date: 8/9/2023  EXAMINATION: ONE XRAY VIEW OF THE CHEST 8/9/2023 10:15 pm COMPARISON: Chest series from July 27, 2023 HISTORY: ORDERING SYSTEM PROVIDED HISTORY: sob TECHNOLOGIST PROVIDED HISTORY: Reason for exam:->sob What reading provider will be dictating this exam?->CRC FINDINGS: Atherosclerotic disease in the thoracic aorta. Cardiac silhouette is prominent. Persistent and perhaps slightly worsened interstitial opacities in the left greater than right lungs. There appear to be small pleural effusions. No obvious pneumothorax. Dysmorphic changes to the right humeral head. Osseous mineralization is decreased. Kyphoplasty changes noted. 1.  Persistent and perhaps slightly worsened interstitial opacities in the left greater than right lungs. Edema versus infection. Please correlate with clinical presentation. 2.  Stable atherosclerotic disease and cardiac silhouette enlargement. 3.  Decreased osseous mineralization. Dysmorphic changes to the right humeral head. Patient has undergone previous kyphoplasty. XR CHEST PORTABLE    Result Date: 7/27/2023  EXAMINATION: ONE XRAY VIEW OF THE CHEST 7/27/2023 9:00 am COMPARISON: 07/26/2023.  HISTORY: ORDERING SYSTEM PROVIDED HISTORY: Chest pain TECHNOLOGIST PROVIDED HISTORY: Reason for exam:->Chest pain What reading provider will be dictating this exam?->CRC

## 2023-08-19 NOTE — PLAN OF CARE
Problem: Chronic Conditions and Co-morbidities  Goal: Patient's chronic conditions and co-morbidity symptoms are monitored and maintained or improved  8/19/2023 1917 by Jenny Conti RN  Outcome: Progressing     Problem: Discharge Planning  Goal: Discharge to home or other facility with appropriate resources  Outcome: Progressing     Problem: Safety - Adult  Goal: Free from fall injury  8/19/2023 1917 by Jenny Conti RN  Outcome: Progressing     Problem: Pain  Goal: Verbalizes/displays adequate comfort level or baseline comfort level  8/19/2023 1917 by Jenny Conti RN  Outcome: Progressing     Problem: Skin/Tissue Integrity  Goal: Absence of new skin breakdown  Description: 1. Monitor for areas of redness and/or skin breakdown  2. Assess vascular access sites hourly  3. Every 4-6 hours minimum:  Change oxygen saturation probe site  4. Every 4-6 hours:  If on nasal continuous positive airway pressure, respiratory therapy assess nares and determine need for appliance change or resting period.   8/19/2023 1917 by Jenny Conti RN  Outcome: Progressing     Problem: ABCDS Injury Assessment  Goal: Absence of physical injury  8/19/2023 1917 by Jenny Conti RN  Outcome: Progressing     Problem: Neurosensory - Adult  Goal: Absence of seizures  Outcome: Progressing     Problem: Neurosensory - Adult  Goal: Remains free of injury related to seizures activity  Outcome: Progressing     Problem: Neurosensory - Adult  Goal: Achieves maximal functionality and self care  Outcome: Progressing     Problem: Respiratory - Adult  Goal: Achieves optimal ventilation and oxygenation  8/19/2023 1917 by Jenny Conti RN  Outcome: Progressing     Problem: Cardiovascular - Adult  Goal: Maintains optimal cardiac output and hemodynamic stability  Outcome: Progressing     Problem: Cardiovascular - Adult  Goal: Absence of cardiac dysrhythmias or at baseline  Outcome: Progressing     Problem: Skin/Tissue Integrity - Adult  Goal:

## 2023-08-19 NOTE — ED PROVIDER NOTES
Please note this is a downtime chart supplement to original charting performed under downtime procedures            [facility] 4WE MICU  [address]        Pt Name: Tea Jara  MRN: [id number]  [facility] 1955  Date of evaluation: 8/19/2023  Provider: Horacio Whyte DO  PCP: Dipti Urias MD  Note Started: 2:22 AM EDT 8/19/23    CHIEF COMPLAINT       Chief Complaint   Patient presents with    Shortness of Breath     (Started earlier last night, does wear home O2 on 6L)       HISTORY OF PRESENT ILLNESS: 1 or more Elements   History From: Patient, EMR    Limitations to history : None    Tea Jara is a 78 y.o. female who presents via EMS for shortness of breath. History of some oxygen dependence multiple admissions, comes in for worsening shortness of breath began this evening. Was recently discharged from the hospital, discharged on Bactrim. States this evening she felt short of breath on her 6 L and prompted a call to EMS. Nursing Notes were all reviewed and agreed with or any disagreements were addressed in the HPI. REVIEW OF EXTERNAL NOTE :       Internal medicine discharge summary from 2/18/2023, for acute on chronic hypoxic respiratory failure, initially started on cefepime and doxycycline, sputum cultures grew Pseudomonas she was switched to meropenem and Bactrim, course complicated by IRMA so nephrology discharged home on Bactrim. She declined going to rehab. REVIEW OF SYSTEMS :           Positives and Pertinent negatives as per HPI.      SURGICAL HISTORY     Past Surgical History:   Procedure Laterality Date    ABDOMEN SURGERY  2008    ischemic colitis    COLONOSCOPY  2008    healed colitis    COLONOSCOPY  04/11/2014    COLONOSCOPY  11/23/2015    severe colitis    COLONOSCOPY  10/24/2016    COLONOSCOPY  07/26/2017    ECHO COMPL W DOP COLOR FLOW  8/16/2015         ECHO COMPLETE  4/22/2013         FRACTURE SURGERY  2010    Tibial right    HEMORRHOID SURGERY

## 2023-08-19 NOTE — PROGRESS NOTES
4 Eyes Skin Assessment     NAME:  Edwin Ramon  YOB: 1955  MEDICAL RECORD NUMBER:  [id number]    The patient is being assessed for  {Reason for Assessment:25095}    I agree that at least one RN has performed a thorough Head to Toe Skin Assessment on the patient. ALL assessment sites listed below have been assessed. Areas assessed by both nurses:    Head, Face, Ears, Shoulders, Back, Chest, Arms, Elbows, Hands, Sacrum. Buttock, Coccyx, Ischium, and Legs. Feet and Heels        Does the Patient have a Wound?  No noted wound(s)       Blas Prevention initiated by RN: Yes  Wound Care Orders initiated by RN: No    Pressure Injury (Stage 3,4, Unstageable, DTI, NWPT, and Complex wounds) if present, place Wound referral order by RN under : No    New Ostomies, if present place, Ostomy referral order under : No     Nurse 1 eSignature: Electronically signed by Danna Barros RN on 8/19/23 at 4:31 PM EDT    **SHARE this note so that the co-signing nurse can place an eSignature**    Nurse 2 eSignature: {Esignature:[id number]}

## 2023-08-19 NOTE — CONSULTS
[facility]   [x] [facility] [] [place], [place]        Pulmonary & Critical Care Medicine  ICU/MICU   Service date: 2023 1:29 PM    Note written by :  Romy Camejo M.D, [facility] Critical Care & Pulmonary Medicine  Note type    :  New Consult, Initial Assessment               Name   : Leobardo Alejandre            : 1955    MR/Act # : [id number],   Room   : [address] [place] [address] Age            :  78 y.o. Billing  #  : [id number]   PCP     : Tom eWrner MD Referred by: Elli Lacy MD ICU Attending: Valerie Brush MD   Admit Date Hosp: 2023  2:21 AM LOS            : 0 Hospital Day: 1   ICU Day: 1 Vent day     : 1 Extubated: unknown date     Code Status: Full Code      ALLERGIES: Patient has no known allergies. REASON for consult:   Post CPR in ER,   severe respiratory distress, hypoxic respiratory failure, hypercarbic respiratory failure, congestive heart failure, cardiac arrhythmia, hypotension, and mental status changes, hyponatremia and hypocalcemia   . CHIEF COMPLAINT on day of admission or ER visit on 2023  2:21 AM   Shortness of Breath ((Started earlier last night, does wear home O2 on 6L))  . HPI ON ADMISSION 2023  2:21 AM  eLobardo Alejandre 78 y.o. female, was seen for severe respiratory distress, hypotension, mental status changes, and Vent Support    78 y.o. female who presented to ED with dyspnea. She was discharged from the hospital to home yesterday by the Hospitalist on Bactrim. Discharge DX was: Acute on chronic respiratory failure ([address]), Moderate protein-calorie malnutrition ([address]), Pseudomonas PNA, Sarcoidosis, Pulm HTN, IRMA on CKD , CKD IIIa, baseline cr 1.2-1.5  Hypernatremia , Anemia of CKD, PAF anticoagulated on Eliquis, Hx PE/DVT, Hypothyroidism and   Hx seizures on Keppra. On 23 she arrived to ED , she did decompensate and ended up in PEA. 2 rounds of CPR were performed and ROSC of achieved.   Patient is now intubated and on levophed drop into both eyes as needed for Dry Eyes  famotidine (PEPCID) 20 MG tablet, Take 1 tablet by mouth daily  furosemide (LASIX) 20 MG tablet, Take 2 tablets by mouth daily  amiodarone (CORDARONE) 200 MG tablet, Take 1 tablet by mouth daily  ferrous sulfate (IRON 325) 325 (65 Fe) MG tablet, Take 1 tablet by mouth 2 times daily  docusate sodium (COLACE) 100 MG capsule, Take 1 capsule by mouth 2 times daily  polyethylene glycol (GLYCOLAX) 17 g packet, Take 17 g by mouth daily as needed for Constipation  potassium chloride (KLOR-CON M) 20 MEQ extended release tablet, Take 1 tablet by mouth daily  metoprolol succinate (TOPROL XL) 50 MG extended release tablet, Take 1 tablet by mouth 2 times daily  ELIQUIS 5 MG TABS tablet, Take 1 tablet by mouth 2 times daily  fluticasone furoate-vilanterol (BREO ELLIPTA) 100-25 MCG/ACT inhaler, Inhale 1 puff into the lungs daily  omega-3 acid ethyl esters (LOVAZA) 1 g capsule, Take 1 capsule by mouth 2 times daily  levETIRAcetam (KEPPRA) 500 MG tablet, Take 1 tablet by mouth 2 times daily  OXYGEN, Inhale 6 L into the lungs continuous  DULoxetine (CYMBALTA) 60 MG extended release capsule, Take 1 capsule by mouth daily  levothyroxine (SYNTHROID) 75 MCG tablet, Take 1 tablet by mouth daily  albuterol sulfate HFA (PROVENTIL HFA) 108 (90 Base) MCG/ACT inhaler, Inhale 2 puffs into the lungs every 6 hours as needed for Wheezing or Shortness of Breath  albuterol (PROVENTIL) (2.5 MG/3ML) 0.083% nebulizer solution, Take 3 mLs by nebulization every 6 hours as needed for Wheezing or Shortness of Breath  sodium chloride (OCEAN) 0.65 % nasal spray, 1 spray by Nasal route as needed for Congestion   . ALLERGIES  Patient has no known allergies.   .  IMMUNIZATION  Immunization History   Administered Date(s) Administered    COVID-19, MODERNA BLUE border, Primary or Immunocompromised, (age 12y+), IM, 100 mcg/0.5mL 02/27/2021, 03/25/2021, 04/11/2022    COVID-19, MODERNA Bivalent, (age 12y+), IM, 48 mcg/0.5

## 2023-08-19 NOTE — ED PROVIDER NOTES
Department of Emergency Medicine    ED  Provider Note - Sign out / Oncoming Provider   Admit Date/RoomTime: 8/19/2023  2:21 AM  8:36 AM EDT      I received this patient at sign out from Dr. Td Spaulding. I have discussed the patient's initial exam, treatment and plan of care with the out going physician. I have introduced my self to the patient / family and have answered their questions to this point. I have examined the patient myself and reviewed ordered tests / medications and  reviewed any available results to this point. If a resident is involved in the Emergency Department care, I have discussed my findings and plan with them as well. Intubation Procedure Note    Indication: Respiratory failure and airway compromise    Consent: Unable to be obtained due to the emergent nature of this procedure. Medications Used: None    Procedure: The patient was placed in the appropriate position. Cricoid pressure was not required. Intubation was performed by indirect laryngoscopy using a bronchoscope and a 7.5 cuffed endotracheal tube. The cuff was then inflated and the tube was secured appropriately at a distance of 22 cm to the dental ridge. Initial confirmation of placement included bilateral breath sounds, an end tidal CO2 detector, tube fogging, and adequate pulse oximetry reading. A chest x-ray to verify correct placement of the tube showed appropriate tube position. The patient tolerated the procedure well. Complications: 2 attempts during CPR    Central Line Placement Procedure Note    Indication: vascular access, poor peripheral access, and hypovolemia    Consent: Unable to be obtained due to the emergent nature of this procedure. Procedure: The patient was positioned appropriately and the skin over the right femoral vein was prepped with Chloraprep. Local anesthesia was not performed due to the emergent nature of this procedure. A large bore needle was used to identify the vein.   A guide

## 2023-08-19 NOTE — PROGRESS NOTES
Patient admitted to MICU with the following belongings:  Cell Phone, [employer] Lilli, Pants, Shirt, and Shoes.  The following belongings admitted with the patient are currently at bedside

## 2023-08-20 ENCOUNTER — APPOINTMENT (OUTPATIENT)
Dept: GENERAL RADIOLOGY | Age: 68
DRG: 870 | End: 2023-08-20
Payer: MEDICARE

## 2023-08-20 LAB
AADO2: 210.2 MMHG
AADO2: 293.5 MMHG
AADO2: 552.3 MMHG
ALBUMIN SERPL-MCNC: 3.3 G/DL (ref 3.5–5.2)
ALP SERPL-CCNC: 109 U/L (ref 35–104)
ALT SERPL-CCNC: 688 U/L (ref 0–32)
ANION GAP SERPL CALCULATED.3IONS-SCNC: 13 MMOL/L (ref 7–16)
AST SERPL-CCNC: 1214 U/L (ref 0–31)
B.E.: -3.8 MMOL/L (ref -3–3)
B.E.: 1.6 MMOL/L (ref -3–3)
B.E.: 2.6 MMOL/L (ref -3–3)
BASOPHILS # BLD: 0.01 K/UL (ref 0–0.2)
BASOPHILS NFR BLD: 0 % (ref 0–2)
BILIRUB SERPL-MCNC: 0.2 MG/DL (ref 0–1.2)
BUN SERPL-MCNC: 28 MG/DL (ref 6–23)
CALCIUM SERPL-MCNC: 8.8 MG/DL (ref 8.6–10.2)
CHLORIDE SERPL-SCNC: 95 MMOL/L (ref 98–107)
CO2 SERPL-SCNC: 29 MMOL/L (ref 22–29)
COHB: 0.6 % (ref 0–1.5)
COHB: 0.7 % (ref 0–1.5)
COHB: 0.8 % (ref 0–1.5)
CREAT SERPL-MCNC: 1.7 MG/DL (ref 0.5–1)
CRITICAL: ABNORMAL
DATE ANALYZED: ABNORMAL
DATE OF COLLECTION: ABNORMAL
EOSINOPHIL # BLD: 0 K/UL (ref 0.05–0.5)
EOSINOPHILS RELATIVE PERCENT: 0 % (ref 0–6)
ERYTHROCYTE [DISTWIDTH] IN BLOOD BY AUTOMATED COUNT: 16.1 % (ref 11.5–15)
FIO2: 100 %
FIO2: 45 %
FIO2: 60 %
GFR SERPL CREATININE-BSD FRML MDRD: 33 ML/MIN/1.73M2
GLUCOSE SERPL-MCNC: 94 MG/DL (ref 74–99)
HCO3: 22 MMOL/L (ref 22–26)
HCO3: 28 MMOL/L (ref 22–26)
HCO3: 28.1 MMOL/L (ref 22–26)
HCT VFR BLD AUTO: 24.2 % (ref 34–48)
HGB BLD-MCNC: 7.6 G/DL (ref 11.5–15.5)
HHB: 23.1 % (ref 0–5)
HHB: 6.3 % (ref 0–5)
HHB: 9.4 % (ref 0–5)
IMM GRANULOCYTES # BLD AUTO: 0.05 K/UL (ref 0–0.58)
IMM GRANULOCYTES NFR BLD: 1 % (ref 0–5)
L PNEUMO1 AG UR QL IA.RAPID: NEGATIVE
LAB: ABNORMAL
LYMPHOCYTES NFR BLD: 0.41 K/UL (ref 1.5–4)
LYMPHOCYTES RELATIVE PERCENT: 5 % (ref 20–42)
Lab: ABNORMAL
MCH RBC QN AUTO: 24.8 PG (ref 26–35)
MCHC RBC AUTO-ENTMCNC: 31.4 G/DL (ref 32–34.5)
MCV RBC AUTO: 79.1 FL (ref 80–99.9)
METHB: 0.5 % (ref 0–1.5)
METHB: 0.6 % (ref 0–1.5)
METHB: 0.7 % (ref 0–1.5)
MODE: AC
MONOCYTES NFR BLD: 0.44 K/UL (ref 0.1–0.95)
MONOCYTES NFR BLD: 5 % (ref 2–12)
NEUTROPHILS NFR BLD: 90 % (ref 43–80)
NEUTS SEG NFR BLD: 8.16 K/UL (ref 1.8–7.3)
O2 CONTENT: 11.7 ML/DL
O2 CONTENT: 12.3 ML/DL
O2 CONTENT: 9.9 ML/DL
O2 SATURATION: 76.6 % (ref 92–98.5)
O2 SATURATION: 90.5 % (ref 92–98.5)
O2 SATURATION: 93.6 % (ref 92–98.5)
O2HB: 75.7 % (ref 94–97)
O2HB: 89.4 % (ref 94–97)
O2HB: 92.2 % (ref 94–97)
OPERATOR ID: 7221
OPERATOR ID: 7490
OPERATOR ID: 7490
PATIENT TEMP: 37 C
PCO2: 43.1 MMHG (ref 35–45)
PCO2: 47.6 MMHG (ref 35–45)
PCO2: 54.2 MMHG (ref 35–45)
PEEP/CPAP: 8 CMH2O
PFO2: 0.81 MMHG/%
PFO2: 1.12 MMHG/%
PFO2: 1.12 MMHG/%
PH BLOOD GAS: 7.33 (ref 7.35–7.45)
PH BLOOD GAS: 7.33 (ref 7.35–7.45)
PH BLOOD GAS: 7.39 (ref 7.35–7.45)
PLATELET # BLD AUTO: 193 K/UL (ref 130–450)
PMV BLD AUTO: 11 FL (ref 7–12)
PO2: 50.4 MMHG (ref 75–100)
PO2: 66.9 MMHG (ref 75–100)
PO2: 81.5 MMHG (ref 75–100)
POTASSIUM SERPL-SCNC: 4.6 MMOL/L (ref 3.5–5)
PROCALCITONIN SERPL-MCNC: 3.77 NG/ML (ref 0–0.08)
PROT SERPL-MCNC: 6.1 G/DL (ref 6.4–8.3)
RBC # BLD AUTO: 3.06 M/UL (ref 3.5–5.5)
RI(T): 4.17
RI(T): 4.39
RI(T): 6.78
RR MECHANICAL: 12 B/MIN
S PNEUM AG SPEC QL: NEGATIVE
SODIUM SERPL-SCNC: 137 MMOL/L (ref 132–146)
SOURCE, BLOOD GAS: ABNORMAL
SPECIMEN SOURCE: NORMAL
THB: 8.9 G/DL (ref 11.5–16.5)
THB: 9.3 G/DL (ref 11.5–16.5)
THB: 9.7 G/DL (ref 11.5–16.5)
TIME ANALYZED: 1942
TIME ANALYZED: 2036
TIME ANALYZED: 434
VT MECHANICAL: 350 ML
WBC OTHER # BLD: 9.1 K/UL (ref 4.5–11.5)

## 2023-08-20 PROCEDURE — 2000000000 HC ICU R&B

## 2023-08-20 PROCEDURE — 6360000002 HC RX W HCPCS

## 2023-08-20 PROCEDURE — 74018 RADEX ABDOMEN 1 VIEW: CPT

## 2023-08-20 PROCEDURE — 87081 CULTURE SCREEN ONLY: CPT

## 2023-08-20 PROCEDURE — 99291 CRITICAL CARE FIRST HOUR: CPT | Performed by: INTERNAL MEDICINE

## 2023-08-20 PROCEDURE — 71045 X-RAY EXAM CHEST 1 VIEW: CPT

## 2023-08-20 PROCEDURE — 82805 BLOOD GASES W/O2 SATURATION: CPT

## 2023-08-20 PROCEDURE — 2500000003 HC RX 250 WO HCPCS

## 2023-08-20 PROCEDURE — 2580000003 HC RX 258

## 2023-08-20 PROCEDURE — 2500000003 HC RX 250 WO HCPCS: Performed by: INTERNAL MEDICINE

## 2023-08-20 PROCEDURE — 6360000002 HC RX W HCPCS: Performed by: INTERNAL MEDICINE

## 2023-08-20 PROCEDURE — 36600 WITHDRAWAL OF ARTERIAL BLOOD: CPT

## 2023-08-20 PROCEDURE — 84145 PROCALCITONIN (PCT): CPT

## 2023-08-20 PROCEDURE — 2580000003 HC RX 258: Performed by: INTERNAL MEDICINE

## 2023-08-20 PROCEDURE — 94003 VENT MGMT INPAT SUBQ DAY: CPT

## 2023-08-20 PROCEDURE — 85025 COMPLETE CBC W/AUTO DIFF WBC: CPT

## 2023-08-20 PROCEDURE — 94640 AIRWAY INHALATION TREATMENT: CPT

## 2023-08-20 PROCEDURE — C9113 INJ PANTOPRAZOLE SODIUM, VIA: HCPCS

## 2023-08-20 PROCEDURE — 99233 SBSQ HOSP IP/OBS HIGH 50: CPT | Performed by: FAMILY MEDICINE

## 2023-08-20 PROCEDURE — 6370000000 HC RX 637 (ALT 250 FOR IP)

## 2023-08-20 PROCEDURE — 80053 COMPREHEN METABOLIC PANEL: CPT

## 2023-08-20 PROCEDURE — A4216 STERILE WATER/SALINE, 10 ML: HCPCS

## 2023-08-20 RX ORDER — MIDAZOLAM HYDROCHLORIDE 1 MG/ML
1-10 INJECTION, SOLUTION INTRAVENOUS CONTINUOUS
Status: DISCONTINUED | OUTPATIENT
Start: 2023-08-20 | End: 2023-08-26

## 2023-08-20 RX ORDER — FENTANYL CITRATE-0.9 % NACL/PF 10 MCG/ML
25-200 PLASTIC BAG, INJECTION (ML) INTRAVENOUS CONTINUOUS
Status: DISCONTINUED | OUTPATIENT
Start: 2023-08-20 | End: 2023-08-26

## 2023-08-20 RX ADMIN — VANCOMYCIN HYDROCHLORIDE 1500 MG: 10 INJECTION, POWDER, LYOPHILIZED, FOR SOLUTION INTRAVENOUS at 16:22

## 2023-08-20 RX ADMIN — BUDESONIDE INHALATION 500 MCG: 0.5 SUSPENSION RESPIRATORY (INHALATION) at 19:07

## 2023-08-20 RX ADMIN — FENTANYL CITRATE 50 MCG: 0.05 INJECTION, SOLUTION INTRAMUSCULAR; INTRAVENOUS at 08:59

## 2023-08-20 RX ADMIN — SODIUM CHLORIDE, PRESERVATIVE FREE 40 MG: 5 INJECTION INTRAVENOUS at 08:32

## 2023-08-20 RX ADMIN — Medication 100 MCG/HR: at 22:06

## 2023-08-20 RX ADMIN — Medication 2 MCG/MIN: at 09:41

## 2023-08-20 RX ADMIN — METHYLPREDNISOLONE SODIUM SUCCINATE 40 MG: 40 INJECTION, POWDER, LYOPHILIZED, FOR SOLUTION INTRAMUSCULAR; INTRAVENOUS at 12:02

## 2023-08-20 RX ADMIN — FUROSEMIDE 40 MG: 10 INJECTION, SOLUTION INTRAMUSCULAR; INTRAVENOUS at 08:32

## 2023-08-20 RX ADMIN — Medication 10 ML: at 08:44

## 2023-08-20 RX ADMIN — Medication 5 ML: at 22:16

## 2023-08-20 RX ADMIN — POLYETHYLENE GLYCOL 3350 17 GRAM ORAL POWDER PACKET 17 G: at 08:32

## 2023-08-20 RX ADMIN — METHYLPREDNISOLONE SODIUM SUCCINATE 40 MG: 40 INJECTION, POWDER, LYOPHILIZED, FOR SOLUTION INTRAMUSCULAR; INTRAVENOUS at 21:03

## 2023-08-20 RX ADMIN — MORPHINE SULFATE 2 MG: 2 INJECTION, SOLUTION INTRAMUSCULAR; INTRAVENOUS at 09:08

## 2023-08-20 RX ADMIN — LEVETIRACETAM 500 MG: 500 TABLET, FILM COATED ORAL at 21:03

## 2023-08-20 RX ADMIN — STANDARDIZED SENNA CONCENTRATE 8.6 MG: 8.6 TABLET ORAL at 21:03

## 2023-08-20 RX ADMIN — ARFORMOTEROL TARTRATE 15 MCG: 15 SOLUTION RESPIRATORY (INHALATION) at 19:07

## 2023-08-20 RX ADMIN — LEVOTHYROXINE SODIUM 75 MCG: 0.07 TABLET ORAL at 08:31

## 2023-08-20 RX ADMIN — PIPERACILLIN AND TAZOBACTAM 4500 MG: 4; .5 INJECTION, POWDER, LYOPHILIZED, FOR SOLUTION INTRAVENOUS at 22:44

## 2023-08-20 RX ADMIN — LEVETIRACETAM 500 MG: 500 TABLET, FILM COATED ORAL at 08:31

## 2023-08-20 RX ADMIN — BUDESONIDE INHALATION 500 MCG: 0.5 SUSPENSION RESPIRATORY (INHALATION) at 05:12

## 2023-08-20 RX ADMIN — Medication 2 MG/HR: at 19:49

## 2023-08-20 RX ADMIN — APIXABAN 5 MG: 5 TABLET, FILM COATED ORAL at 08:31

## 2023-08-20 RX ADMIN — METHYLPREDNISOLONE SODIUM SUCCINATE 40 MG: 40 INJECTION, POWDER, LYOPHILIZED, FOR SOLUTION INTRAMUSCULAR; INTRAVENOUS at 04:00

## 2023-08-20 RX ADMIN — PIPERACILLIN AND TAZOBACTAM 4500 MG: 4; .5 INJECTION, POWDER, LYOPHILIZED, FOR SOLUTION INTRAVENOUS at 15:40

## 2023-08-20 RX ADMIN — ARFORMOTEROL TARTRATE 15 MCG: 15 SOLUTION RESPIRATORY (INHALATION) at 05:12

## 2023-08-20 RX ADMIN — APIXABAN 5 MG: 5 TABLET, FILM COATED ORAL at 21:03

## 2023-08-20 RX ADMIN — AMIODARONE HYDROCHLORIDE 200 MG: 200 TABLET ORAL at 08:31

## 2023-08-20 RX ADMIN — Medication 75 MCG/HR: at 09:23

## 2023-08-20 ASSESSMENT — PULMONARY FUNCTION TESTS
PIF_VALUE: 21
PIF_VALUE: 17
PIF_VALUE: 17
PIF_VALUE: 19
PIF_VALUE: 21
PIF_VALUE: 20
PIF_VALUE: 19
PIF_VALUE: 19
PIF_VALUE: 12
PIF_VALUE: 19
PIF_VALUE: 19
PIF_VALUE: 18
PIF_VALUE: 26
PIF_VALUE: 14
PIF_VALUE: 29
PIF_VALUE: 26
PIF_VALUE: 17
PIF_VALUE: 23
PIF_VALUE: 31
PIF_VALUE: 26
PIF_VALUE: 14
PIF_VALUE: 19
PIF_VALUE: 14
PIF_VALUE: 19

## 2023-08-20 ASSESSMENT — PAIN SCALES - GENERAL
PAINLEVEL_OUTOF10: 0
PAINLEVEL_OUTOF10: 0
PAINLEVEL_OUTOF10: 8
PAINLEVEL_OUTOF10: 0
PAINLEVEL_OUTOF10: 6
PAINLEVEL_OUTOF10: 0
PAINLEVEL_OUTOF10: 6
PAINLEVEL_OUTOF10: 0

## 2023-08-20 NOTE — PROGRESS NOTES
Patient continues to pull at lines and tubes despite attempts to deter. Restraints continued for safety purposes.

## 2023-08-20 NOTE — PLAN OF CARE
Problem: Chronic Conditions and Co-morbidities  Goal: Patient's chronic conditions and co-morbidity symptoms are monitored and maintained or improved  Outcome: Progressing     Problem: Discharge Planning  Goal: Discharge to home or other facility with appropriate resources  Outcome: Progressing     Problem: Safety - Adult  Goal: Free from fall injury  Outcome: Progressing     Problem: Pain  Goal: Verbalizes/displays adequate comfort level or baseline comfort level  Outcome: Progressing  Flowsheets (Taken 8/20/2023 0400 by Jesus Manuel Keller RN)  Verbalizes/displays adequate comfort level or baseline comfort level:   Assess pain using appropriate pain scale   Encourage patient to monitor pain and request assistance   Administer analgesics based on type and severity of pain and evaluate response     Problem: Skin/Tissue Integrity  Goal: Absence of new skin breakdown  Description: 1. Monitor for areas of redness and/or skin breakdown  2. Assess vascular access sites hourly  3. Every 4-6 hours minimum:  Change oxygen saturation probe site  4. Every 4-6 hours:  If on nasal continuous positive airway pressure, respiratory therapy assess nares and determine need for appliance change or resting period.   Outcome: Progressing     Problem: ABCDS Injury Assessment  Goal: Absence of physical injury  Outcome: Progressing     Problem: Neurosensory - Adult  Goal: Achieves stable or improved neurological status  Outcome: Progressing     Problem: Neurosensory - Adult  Goal: Absence of seizures  Outcome: Progressing     Problem: Neurosensory - Adult  Goal: Remains free of injury related to seizures activity  Outcome: Progressing     Problem: Neurosensory - Adult  Goal: Achieves maximal functionality and self care  Outcome: Progressing     Problem: Respiratory - Adult  Goal: Achieves optimal ventilation and oxygenation  Outcome: Progressing     Problem: Cardiovascular - Adult  Goal: Maintains optimal cardiac output and hemodynamic

## 2023-08-20 NOTE — PROGRESS NOTES
[facility]   [x] [address] [] [place], [place]        Pulmonary & Critical Care Medicine  ICU/MICU   Service date: 2023 2:56 PM    Note written by :  Rozina Camejo M.D, [facility] Critical Care & Pulmonary Medicine  Note type    :  New Consult, Initial Assessment               Name   : Mahogany Rowe            : 1955    MR/Act # : [id number],   Room   : [address] [place] [address] Age            :  78 y.o. Billing  #  : [id number]   PCP     : Hayes Smart MD Referred by: Maggie Umana MD ICU Attending: Sarah Medina MD   Admit Date Hosp: 2023  2:21 AM LOS            : 1 Hospital Day: 2   ICU Day: 2 Vent day     : 2 Extubated: unknown date     Code Status: Full Code      ALLERGIES: Patient has no known allergies. REASON for consult:   Post CPR in ER,   severe respiratory distress, hypoxic respiratory failure, hypercarbic respiratory failure, congestive heart failure, cardiac arrhythmia, hypotension, and mental status changes, hyponatremia and hypocalcemia   . CHIEF COMPLAINT on day of admission or ER visit on 2023  2:21 AM   Shortness of Breath ((Started earlier last night, does wear home O2 on 6L))  . HPI ON ADMISSION 2023  2:21 AM  Mahogany Rowe 78 y.o. female, was seen for severe respiratory distress, hypotension, mental status changes, and Vent Support   78 y.o. female who presented to ED with dyspnea. She was discharged from the hospital to home yesterday by the Hospitalist on Bactrim. Discharge DX was: Acute on chronic respiratory failure ([address]), Moderate protein-calorie malnutrition ([address]), Pseudomonas PNA, Sarcoidosis, Pulm HTN, IRMA on CKD , CKD IIIa, baseline cr 1.2-1.5 Hypernatremia , Anemia of CKD, PAF anticoagulated on Eliquis, Hx PE/DVT, Hypothyroidism and Hx seizures on Keppra. On 23 she arrived to ED , she did decompensate and ended up in PEA. 2 rounds of CPR were performed and ROSC of achieved.   Patient is now intubated and on levophed and was 2023  ROSC achieved  8/19. Patient now intubated and on small pressor;   IV abx.-cefepime, vancomycin and tigecycline given in ED  Switched to Vanco/Pip Tazo    Pl note pt had just completed extensive treatment for anti-pseudomonas treatment)               Adjust antibiotics on Monday after ID Cx  Wean levaphed as tolerated  4. Solumedrol  5. Urine studies  6. Hold home BP meds for now. 7.  Urine strep and legionella  8. Respiratory culture  9. Vent Adjusted  10. Tube Feedings started   11. Prognosis grave  . CONSULTANTS:   IP CONSULT TO CRITICAL CARE  IP CONSULT TO INTERNAL MEDICINE  IP CONSULT TO SOCIAL WORK  IP CONSULT TO CRITICAL CARE  PHARMACY TO DOSE VANCOMYCIN   IP ID Consult  `.. GI Prophylaxis  [x] PPI [] H2 Blocker [] Carafate [][email or web address]@   DVT Proph  Eliquis   MDM  High   Disposition  Unchanged. Updated Family at bedside,   Continue ICU    Patient requires continued admission due to requirement of clinical stabilization   Code Status  Full Code, elects Full Code (Attempt Resuscitation)    Diet  Tube feeding   Tube Feedings  Nasogastric tube,  6.5 F ---> Standard with fiber, Fluid Restricted, and Immune Enhancing    TF type: Pulmocare/Nephro/Glucerna/Jevity ---> At rate: 35 ml/hr   Bowel regimen  stool softner, Miralax, and fiber   Pain management  Tylenol #3, 1 or 2 q4h prn   Mouth hygiene  peridex 15ml BID   Decub/Wounds  x    Restrains    4 point leather     PT/OT   physical therapy and occupational therapy    Activity  Bed Rest   Urinary cath  Strict I&O   Isolation  None   Family updated  an initial family conferencewith spouse   Reason: Diagnoses, Prognosis, Treatment Options, and Goals of Care      . [facility] and ICU Staff Physician's note of personal involvement in Care. During multidisciplinary team rounds, case of patient noble Holbrook was discussed in detail.  As the attending [address] [place] [address] physician, I confirm that the the patient and or individuals accompanying the patient. Clair Camejo M.D, [address]  8/20/2023   2:56 PM    Disclaimer: This report, in part or full, may have been transcribed using voice recognition software. Every effort was made to ensure accuracy; however, inadvertent computerized transcription errors may be present. Please excuse any transcriptional grammatical   or spelling errors that may have escaped my editorial review.                                                           ------  END  -----

## 2023-08-20 NOTE — PROGRESS NOTES
Pharmacy Consultation Note  (Antibiotic Dosing and Monitoring)    Initial consult date: 8/20/2023  Consulting physician/provider: Dr. Viktor Porter  Drug: Vancomycin  Indication: Bacteremia    Age/  Gender Height Weight IBW  Allergy Information   67 y.o./female 5' 2\" (157.5 cm) 170 lb (77.1 kg)     Ideal body weight: 50.1 kg (110 lb 7.2 oz)  Adjusted ideal body weight: 60.9 kg (134 lb 4.3 oz)   Patient has no known allergies. Renal Function:  Recent Labs     08/19/23  0230 08/20/23  0406   BUN 15 28*   CREATININE 1.4* 1.7*       Intake/Output Summary (Last 24 hours) at 8/20/2023 1414  Last data filed at 8/20/2023 1200  Gross per 24 hour   Intake 133 ml   Output 1595 ml   Net -1462 ml       Vancomycin Monitoring:  Trough:  No results for input(s): VANCOTROUGH in the last 72 hours. Random:  No results for input(s): VANCORANDOM in the last 72 hours. No results for input(s): Amria Victoria Oar in the last 72 hours. Historical Cultures:  Organism   Date Value Ref Range Status   07/05/2023 MRSA nasal colonization (A)  Final     No results for input(s): BC in the last 72 hours. Vancomycin Administration Times:  Recent vancomycin administrations        No vancomycin IV orders with administrations found. Orders not given:            vancomycin (VANCOCIN) 1500 mg in sodium chloride 0.9 % 250 mL IVPB             Assessment:  Patient is a 78 y.o. female who has been initiated on vancomycin for Bacteremia  Estimated Creatinine Clearance: 31 mL/min (A) (based on SCr of 1.7 mg/dL (H)).   To dose vancomycin, pharmacy will be utilizing dosing based off of levels because of patient's renal impairment/insufficiency    Plan:  Vancomycin 1000 mg q24h (Predicted AUC/ALE = 527, Tr = 17.1)  Will check vancomycin levels when appropriate  Will continue to monitor renal function   Pharmacy to follow    Thank you for this consult,    Elijah Burris, [facility] 8/20/2023 2:14 PM

## 2023-08-20 NOTE — PROGRESS NOTES
[facility] - [facility]   Resident Progress Note    S:  Hospital day: 1    Brief Synopsis: Trinidad Izquierdo is a 78 y.o. female with a past medical history of pulmonary sarcoidosis, atrial fibrillation on Eliquis, pulmonary hypertension, diabetes insipidus, hypothyroidism, hypertension, chronic kidney disease stage IIIa and combined systolic/diastolic heart failure who presents to the emergency department for shortness of breath. Patient was recently discharged from [facility] (08/18/23) due to acute hypoxic respiratory failure. Patient has had numerous hospital admissions for the same complaint. Hypotensive, tachycardic and hypoxic at presentation. She did decompensate and ended up in PEA. 2 rounds of CPR were performed and ROSC achieved. Patient admitted to the intensive care unit for acute hypoxic respiratory failure requiring intubation and vasopressors. No acute events overnight. Patient continues to reach for lines and tubing when unrestrained. Patient was seen and examined this morning. Intubated and sedated. Without vasopressors.       Cont meds:    dextrose      norepinephrine Stopped (08/19/23 1233)    sodium chloride      fentaNYL 50 mcg/hr (08/19/23 1843)     Scheduled meds:    amiodarone  200 mg Oral Daily    budesonide  500 mcg Nebulization BID RT    arformoterol tartrate  15 mcg Nebulization BID RT    [Held by provider] desmopressin  200 mcg Oral BID    senna  1 tablet Oral Nightly    polyethylene glycol  17 g Oral Daily    apixaban  5 mg Oral BID    [Held by provider] hydrALAZINE  25 mg Oral BID    levETIRAcetam  500 mg Oral BID    levothyroxine  75 mcg Oral Daily    [Held by provider] metoprolol succinate  50 mg Oral BID    sodium chloride flush  5-40 mL IntraVENous 2 times per day    pantoprazole (PROTONIX) 40 mg injection  40 mg IntraVENous Daily    furosemide  40 mg IntraVENous Daily    methylPREDNISolone  40 mg IntraVENous Q8H     PRN meds: fentanNYL, glucose, dextrose

## 2023-08-21 ENCOUNTER — APPOINTMENT (OUTPATIENT)
Dept: NEUROLOGY | Age: 68
DRG: 870 | End: 2023-08-21
Payer: MEDICARE

## 2023-08-21 ENCOUNTER — APPOINTMENT (OUTPATIENT)
Dept: CT IMAGING | Age: 68
DRG: 870 | End: 2023-08-21
Payer: MEDICARE

## 2023-08-21 ENCOUNTER — APPOINTMENT (OUTPATIENT)
Dept: GENERAL RADIOLOGY | Age: 68
DRG: 870 | End: 2023-08-21
Payer: MEDICARE

## 2023-08-21 LAB
AADO2: 573.5 MMHG
ALBUMIN SERPL-MCNC: 3.4 G/DL (ref 3.5–5.2)
ALP SERPL-CCNC: 101 U/L (ref 35–104)
ALT SERPL-CCNC: 513 U/L (ref 0–32)
ANION GAP SERPL CALCULATED.3IONS-SCNC: 10 MMOL/L (ref 7–16)
ANION GAP SERPL CALCULATED.3IONS-SCNC: 11 MMOL/L (ref 7–16)
AST SERPL-CCNC: 415 U/L (ref 0–31)
B.E.: -5.6 MMOL/L (ref -3–3)
BASOPHILS # BLD: 0 K/UL (ref 0–0.2)
BASOPHILS NFR BLD: 0 % (ref 0–2)
BILIRUB SERPL-MCNC: 0.2 MG/DL (ref 0–1.2)
BNP SERPL-MCNC: ABNORMAL PG/ML (ref 0–125)
BUN SERPL-MCNC: 39 MG/DL (ref 6–23)
BUN SERPL-MCNC: 41 MG/DL (ref 6–23)
CALCIUM SERPL-MCNC: 8.5 MG/DL (ref 8.6–10.2)
CALCIUM SERPL-MCNC: 8.5 MG/DL (ref 8.6–10.2)
CHLORIDE SERPL-SCNC: 96 MMOL/L (ref 98–107)
CHLORIDE SERPL-SCNC: 99 MMOL/L (ref 98–107)
CO2 SERPL-SCNC: 30 MMOL/L (ref 22–29)
CO2 SERPL-SCNC: 30 MMOL/L (ref 22–29)
COHB: 0.6 % (ref 0–1.5)
CREAT SERPL-MCNC: 2.3 MG/DL (ref 0.5–1)
CREAT SERPL-MCNC: 2.5 MG/DL (ref 0.5–1)
CRITICAL: ABNORMAL
CRP SERPL HS-MCNC: 47 MG/L (ref 0–5)
DATE ANALYZED: ABNORMAL
DATE LAST DOSE: NORMAL
DATE OF COLLECTION: ABNORMAL
EOSINOPHIL # BLD: 0 K/UL (ref 0.05–0.5)
EOSINOPHILS RELATIVE PERCENT: 0 % (ref 0–6)
ERYTHROCYTE [DISTWIDTH] IN BLOOD BY AUTOMATED COUNT: 16.3 % (ref 11.5–15)
FIO2: 100 %
GFR SERPL CREATININE-BSD FRML MDRD: 21 ML/MIN/1.73M2
GFR SERPL CREATININE-BSD FRML MDRD: 23 ML/MIN/1.73M2
GLUCOSE BLD-MCNC: 160 MG/DL (ref 74–99)
GLUCOSE SERPL-MCNC: 125 MG/DL (ref 74–99)
GLUCOSE SERPL-MCNC: 133 MG/DL (ref 74–99)
HCO3: 18.7 MMOL/L (ref 22–26)
HCT VFR BLD AUTO: 25 % (ref 34–48)
HGB BLD-MCNC: 7.7 G/DL (ref 11.5–15.5)
HHB: 4 % (ref 0–5)
LAB: ABNORMAL
LYMPHOCYTES NFR BLD: 0.17 K/UL (ref 1.5–4)
LYMPHOCYTES RELATIVE PERCENT: 2 % (ref 20–42)
Lab: ABNORMAL
MCH RBC QN AUTO: 24.8 PG (ref 26–35)
MCHC RBC AUTO-ENTMCNC: 30.8 G/DL (ref 32–34.5)
MCV RBC AUTO: 80.4 FL (ref 80–99.9)
METHB: 0.5 % (ref 0–1.5)
MICROORGANISM SPEC CULT: ABNORMAL
MICROORGANISM SPEC CULT: ABNORMAL
MODE: AC
MONOCYTES NFR BLD: 0.26 K/UL (ref 0.1–0.95)
MONOCYTES NFR BLD: 3 % (ref 2–12)
NEUTROPHILS NFR BLD: 96 % (ref 43–80)
NEUTS SEG NFR BLD: 9.47 K/UL (ref 1.8–7.3)
NUCLEATED RED BLOOD CELLS: 1 PER 100 WBC
O2 CONTENT: 11.5 ML/DL
O2 SATURATION: 96 % (ref 92–98.5)
O2HB: 94.9 % (ref 94–97)
OPERATOR ID: 7221
PATIENT TEMP: 37 C
PCO2: 31.9 MMHG (ref 35–45)
PEEP/CPAP: 8 CMH2O
PFO2: 0.83 MMHG/%
PH BLOOD GAS: 7.39 (ref 7.35–7.45)
PLATELET # BLD AUTO: 202 K/UL (ref 130–450)
PMV BLD AUTO: 11.2 FL (ref 7–12)
PO2: 82.6 MMHG (ref 75–100)
POTASSIUM SERPL-SCNC: 4 MMOL/L (ref 3.5–5)
POTASSIUM SERPL-SCNC: 4.7 MMOL/L (ref 3.5–5)
PROCALCITONIN SERPL-MCNC: 3.95 NG/ML (ref 0–0.08)
PROT SERPL-MCNC: 6.3 G/DL (ref 6.4–8.3)
RBC # BLD AUTO: 3.11 M/UL (ref 3.5–5.5)
RBC # BLD: ABNORMAL 10*6/UL
RI(T): 6.94
RR MECHANICAL: 12 B/MIN
SODIUM SERPL-SCNC: 136 MMOL/L (ref 132–146)
SODIUM SERPL-SCNC: 140 MMOL/L (ref 132–146)
SODIUM UR-SCNC: 24 MMOL/L
SOURCE, BLOOD GAS: ABNORMAL
SPECIMEN DESCRIPTION: ABNORMAL
THB: 8.5 G/DL (ref 11.5–16.5)
TIME ANALYZED: 531
TME LAST DOSE: NORMAL H
UUN UR-MCNC: 398 MG/DL (ref 800–1666)
VANCOMYCIN DOSE: NORMAL MG
VANCOMYCIN SERPL-MCNC: 24.6 UG/ML (ref 5–40)
VT MECHANICAL: 350 ML
WBC OTHER # BLD: 9.9 K/UL (ref 4.5–11.5)

## 2023-08-21 PROCEDURE — 6360000002 HC RX W HCPCS: Performed by: NURSE PRACTITIONER

## 2023-08-21 PROCEDURE — 2580000003 HC RX 258: Performed by: INTERNAL MEDICINE

## 2023-08-21 PROCEDURE — 95822 EEG COMA OR SLEEP ONLY: CPT | Performed by: PSYCHIATRY & NEUROLOGY

## 2023-08-21 PROCEDURE — 2500000003 HC RX 250 WO HCPCS: Performed by: INTERNAL MEDICINE

## 2023-08-21 PROCEDURE — C9113 INJ PANTOPRAZOLE SODIUM, VIA: HCPCS

## 2023-08-21 PROCEDURE — 6360000004 HC RX CONTRAST MEDICATION: Performed by: RADIOLOGY

## 2023-08-21 PROCEDURE — 94640 AIRWAY INHALATION TREATMENT: CPT

## 2023-08-21 PROCEDURE — 94003 VENT MGMT INPAT SUBQ DAY: CPT

## 2023-08-21 PROCEDURE — 84540 ASSAY OF URINE/UREA-N: CPT

## 2023-08-21 PROCEDURE — 2580000003 HC RX 258

## 2023-08-21 PROCEDURE — A4216 STERILE WATER/SALINE, 10 ML: HCPCS

## 2023-08-21 PROCEDURE — 6360000002 HC RX W HCPCS

## 2023-08-21 PROCEDURE — 80048 BASIC METABOLIC PNL TOTAL CA: CPT

## 2023-08-21 PROCEDURE — 84145 PROCALCITONIN (PCT): CPT

## 2023-08-21 PROCEDURE — 2580000003 HC RX 258: Performed by: NURSE PRACTITIONER

## 2023-08-21 PROCEDURE — 84300 ASSAY OF URINE SODIUM: CPT

## 2023-08-21 PROCEDURE — 99223 1ST HOSP IP/OBS HIGH 75: CPT | Performed by: PSYCHIATRY & NEUROLOGY

## 2023-08-21 PROCEDURE — 95819 EEG AWAKE AND ASLEEP: CPT

## 2023-08-21 PROCEDURE — 99232 SBSQ HOSP IP/OBS MODERATE 35: CPT | Performed by: FAMILY MEDICINE

## 2023-08-21 PROCEDURE — 80053 COMPREHEN METABOLIC PANEL: CPT

## 2023-08-21 PROCEDURE — 83880 ASSAY OF NATRIURETIC PEPTIDE: CPT

## 2023-08-21 PROCEDURE — 80202 ASSAY OF VANCOMYCIN: CPT

## 2023-08-21 PROCEDURE — 86140 C-REACTIVE PROTEIN: CPT

## 2023-08-21 PROCEDURE — 6370000000 HC RX 637 (ALT 250 FOR IP)

## 2023-08-21 PROCEDURE — 2000000000 HC ICU R&B

## 2023-08-21 PROCEDURE — 6360000002 HC RX W HCPCS: Performed by: INTERNAL MEDICINE

## 2023-08-21 PROCEDURE — 82962 GLUCOSE BLOOD TEST: CPT

## 2023-08-21 PROCEDURE — 99222 1ST HOSP IP/OBS MODERATE 55: CPT | Performed by: SURGERY

## 2023-08-21 PROCEDURE — 99291 CRITICAL CARE FIRST HOUR: CPT | Performed by: INTERNAL MEDICINE

## 2023-08-21 PROCEDURE — 6370000000 HC RX 637 (ALT 250 FOR IP): Performed by: INTERNAL MEDICINE

## 2023-08-21 PROCEDURE — 85025 COMPLETE CBC W/AUTO DIFF WBC: CPT

## 2023-08-21 PROCEDURE — 82570 ASSAY OF URINE CREATININE: CPT

## 2023-08-21 PROCEDURE — 93005 ELECTROCARDIOGRAM TRACING: CPT | Performed by: NURSE PRACTITIONER

## 2023-08-21 PROCEDURE — 74177 CT ABD & PELVIS W/CONTRAST: CPT

## 2023-08-21 PROCEDURE — 71045 X-RAY EXAM CHEST 1 VIEW: CPT

## 2023-08-21 PROCEDURE — 82805 BLOOD GASES W/O2 SATURATION: CPT

## 2023-08-21 RX ORDER — LINEZOLID 2 MG/ML
600 INJECTION, SOLUTION INTRAVENOUS EVERY 12 HOURS
Status: DISCONTINUED | OUTPATIENT
Start: 2023-08-21 | End: 2023-08-22

## 2023-08-21 RX ORDER — CHLORHEXIDINE GLUCONATE ORAL RINSE 1.2 MG/ML
15 SOLUTION DENTAL 2 TIMES DAILY
Status: DISCONTINUED | OUTPATIENT
Start: 2023-08-21 | End: 2023-08-28

## 2023-08-21 RX ORDER — 0.9 % SODIUM CHLORIDE 0.9 %
500 INTRAVENOUS SOLUTION INTRAVENOUS ONCE
Status: COMPLETED | OUTPATIENT
Start: 2023-08-21 | End: 2023-08-21

## 2023-08-21 RX ADMIN — SODIUM CHLORIDE, PRESERVATIVE FREE 40 MG: 5 INJECTION INTRAVENOUS at 08:33

## 2023-08-21 RX ADMIN — PIPERACILLIN AND TAZOBACTAM 4500 MG: 4; .5 INJECTION, POWDER, LYOPHILIZED, FOR SOLUTION INTRAVENOUS at 06:34

## 2023-08-21 RX ADMIN — BUDESONIDE INHALATION 500 MCG: 0.5 SUSPENSION RESPIRATORY (INHALATION) at 19:15

## 2023-08-21 RX ADMIN — LINEZOLID 600 MG: 600 INJECTION, SOLUTION INTRAVENOUS at 23:05

## 2023-08-21 RX ADMIN — CEFTOLOZANE AND TAZOBACTAM 500 MG: 1; .5 INJECTION, POWDER, LYOPHILIZED, FOR SOLUTION INTRAVENOUS at 21:04

## 2023-08-21 RX ADMIN — LEVETIRACETAM 500 MG: 500 TABLET, FILM COATED ORAL at 20:44

## 2023-08-21 RX ADMIN — LEVETIRACETAM 500 MG: 500 TABLET, FILM COATED ORAL at 08:33

## 2023-08-21 RX ADMIN — Medication 10 ML: at 08:36

## 2023-08-21 RX ADMIN — IOPAMIDOL 75 ML: 755 INJECTION, SOLUTION INTRAVENOUS at 12:19

## 2023-08-21 RX ADMIN — Medication 100 MCG/HR: at 11:10

## 2023-08-21 RX ADMIN — LINEZOLID 600 MG: 600 INJECTION, SOLUTION INTRAVENOUS at 11:20

## 2023-08-21 RX ADMIN — CHLORHEXIDINE GLUCONATE 15 ML: 1.2 RINSE ORAL at 20:44

## 2023-08-21 RX ADMIN — ARFORMOTEROL TARTRATE 15 MCG: 15 SOLUTION RESPIRATORY (INHALATION) at 08:01

## 2023-08-21 RX ADMIN — LEVOTHYROXINE SODIUM 75 MCG: 0.07 TABLET ORAL at 08:33

## 2023-08-21 RX ADMIN — APIXABAN 5 MG: 5 TABLET, FILM COATED ORAL at 20:44

## 2023-08-21 RX ADMIN — HYDROCORTISONE SODIUM SUCCINATE 100 MG: 100 INJECTION, POWDER, FOR SOLUTION INTRAMUSCULAR; INTRAVENOUS at 14:11

## 2023-08-21 RX ADMIN — SODIUM CHLORIDE: 9 INJECTION, SOLUTION INTRAVENOUS at 21:00

## 2023-08-21 RX ADMIN — BUDESONIDE INHALATION 500 MCG: 0.5 SUSPENSION RESPIRATORY (INHALATION) at 08:01

## 2023-08-21 RX ADMIN — AMIODARONE HYDROCHLORIDE 200 MG: 200 TABLET ORAL at 08:33

## 2023-08-21 RX ADMIN — Medication 100 MCG/HR: at 22:35

## 2023-08-21 RX ADMIN — HYDROCORTISONE SODIUM SUCCINATE 50 MG: 100 INJECTION, POWDER, FOR SOLUTION INTRAMUSCULAR; INTRAVENOUS at 20:45

## 2023-08-21 RX ADMIN — ARFORMOTEROL TARTRATE 15 MCG: 15 SOLUTION RESPIRATORY (INHALATION) at 19:15

## 2023-08-21 RX ADMIN — APIXABAN 5 MG: 5 TABLET, FILM COATED ORAL at 08:33

## 2023-08-21 RX ADMIN — DEXMEDETOMIDINE 0.2 MCG/KG/HR: 100 INJECTION, SOLUTION INTRAVENOUS at 09:58

## 2023-08-21 RX ADMIN — CEFTOLOZANE AND TAZOBACTAM 500 MG: 1; .5 INJECTION, POWDER, LYOPHILIZED, FOR SOLUTION INTRAVENOUS at 11:25

## 2023-08-21 RX ADMIN — IOPAMIDOL 18 ML: 755 INJECTION, SOLUTION INTRAVENOUS at 12:20

## 2023-08-21 RX ADMIN — Medication 10 ML: at 20:45

## 2023-08-21 RX ADMIN — CHLORHEXIDINE GLUCONATE 15 ML: 1.2 RINSE ORAL at 12:03

## 2023-08-21 RX ADMIN — POLYETHYLENE GLYCOL 3350 17 GRAM ORAL POWDER PACKET 17 G: at 08:34

## 2023-08-21 RX ADMIN — STANDARDIZED SENNA CONCENTRATE 8.6 MG: 8.6 TABLET ORAL at 20:44

## 2023-08-21 RX ADMIN — SODIUM CHLORIDE 500 ML: 9 INJECTION, SOLUTION INTRAVENOUS at 21:03

## 2023-08-21 ASSESSMENT — PULMONARY FUNCTION TESTS
PIF_VALUE: 32
PIF_VALUE: 21
PIF_VALUE: 28
PIF_VALUE: 25
PIF_VALUE: 34
PIF_VALUE: 30
PIF_VALUE: 28
PIF_VALUE: 27
PIF_VALUE: 24
PIF_VALUE: 33
PIF_VALUE: 28
PIF_VALUE: 26
PIF_VALUE: 27
PIF_VALUE: 22
PIF_VALUE: 23
PIF_VALUE: 25
PIF_VALUE: 23
PIF_VALUE: 25
PIF_VALUE: 43
PIF_VALUE: 23

## 2023-08-21 ASSESSMENT — PAIN SCALES - GENERAL
PAINLEVEL_OUTOF10: 0

## 2023-08-21 NOTE — CARE COORDINATION
Case Management Assessment  Initial Evaluation    Date/Time of Evaluation: 8/21/2023 11:39 AM  Assessment Completed by: Ame Andres RN    If patient is discharged prior to next notation, then this note serves as note for discharge by case management. Patient Name: Mary Wu                   YOB: 1955  Diagnosis: Cardiac arrest [facility]) [I46.9]  Shortness of breath [R06.02]  Pneumonia due to infectious organism, unspecified laterality, unspecified part of lung [J18.9]                   Date / Time: 8/19/2023  2:21 AM    Patient Admission Status: Inpatient   Readmission Risk (Low < 19, Mod (19-27), High > 27): Readmission Risk Score: 47.8    Current PCP: Manjit Duque MD  PCP verified by CM? Yes    Chart Reviewed: Yes      History Provided by: Medical Record  Patient Orientation: Unable to Assess    Patient Cognition: Alert    Hospitalization in the last 30 days (Readmission):  Yes    If yes, Readmission Assessment in  Navigator will be completed.     Advance Directives:      Code Status: Full Code   Patient's Primary Decision Maker is: Legal Next of Kin    Primary Decision Maker: [id number] [facility] [phone]    Secondary Decision Maker: Gala [facility] [phone]    Discharge Planning:    Patient lives with:   Type of Home:    Primary Care Giver: Self  Patient Support Systems include: Spouse/Significant Other   Current Financial resources:    Current community resources:    Current services prior to admission:              Current DME:              Type of Home Care services:       ADLS  Prior functional level: Assistance with the following:, Mobility, Shopping, Housework, Cooking  Current functional level: Mobility, Shopping, Housework, Cooking, Feeding, Toileting, Dressing, Bathing, Assistance with the following:    PT AM-PAC:   /24  OT AM-PAC:   /24    Family can provide assistance at DC: Yes (limited assist)  Would you like Case Management to discuss the

## 2023-08-21 NOTE — PROGRESS NOTES
Pharmacy Consultation Note  (Antibiotic Dosing and Monitoring)    Initial consult date: 8/21/2023  Consulting physician/provider: Dr. Veronica Cook  Drug: Vancomycin  Indication: Bacteremia    Vancomycin has been discontinued   Clinical Pharmacy to sign-off  Physician to re-consult pharmacy if future dosing is needed    Thank you for the consult,    Odalys Hodge PharmD, BCPS, [facility] 8/21/2023 11:42 AM

## 2023-08-21 NOTE — PROGRESS NOTES
Labs reviewed.  Jason with nephrology for CTA abdomen with contrast.    Electronically signed by FRIDA Cason CNP on 8/21/2023 at 10:35 AM

## 2023-08-21 NOTE — CARE COORDINATION
Care Coordination: LOS 2 day. Pt recently discharged 8/17/23 with multiple readmission history. This recent discharge was to home with [facility] and home 02 via [facility] up to 10 ltr with setting of 6 ltr. [facility]. Per chart review, pt was not agreeable to [facility] and did not qualify for [facility]. Pt returned with increasing sob on 6 ltr, Acute on chronic respiratory failure with hypoxia and hypercapnia with severe pulmonary htn and Cardiac arrest.  Currently intubated. Versed, Zyvox, Zosyn. Back door to [facility] to follow    Electronically signed by Evelia Marquez RN on 8/21/2023 at 11:35 AM     Addendum: received call from [place] from [facility]  [phone]. Pt has transport through provide a rid, emergency response button, incontinent supplies, 5 meals weekly through [employer], an aide through [employer] for 32 hours per week. She is available as needed.     Electronically signed by Evelia Marquez RN on 8/21/2023 at 2:53 PM

## 2023-08-21 NOTE — CONSULTS
[facility]  Neurology Consult    Date:  8/21/2023  Patient Name:  Felix Pang  YOB: 1955  MRN: [id number]     PCP:  Wu An MD   Referring:  No ref. provider found      Chief Complaint: Acute encephalopathy s/p cardiac arrest  History obtained from: NANCY     Jerry Gallagher is a 78 y.o. female with PMH of seizures on Keppra, sarcoidosis, A-fib on Eliquis, hypertension, PE/DVT, hypothyroidism, anemia of CKD who is postcardiac arrest.    Acute encephalopathy s/p cardiac arrest -the patient recently had PEA arrest s/p 2 rounds of CPR with ROSC achieved. Currently sedated on fentanyl, Versed and Precedex, patient is alert, unable to follow commands but opens eyes to loud voice. Acute respiratory failure s/p cardiac arrest in the setting of COPD and pulmonary hypertension as well as history of sarcoidosis      Plan  Wean sedation as deemed appropriate  MRI brain to assess possible injury  Continue to monitor mentation  Sedation may be contributing to the patient's current mental status, as ICU team is weaning sedation neurology will continue to assess        History of Present Illness:  Felix Pang is a 78 y.o. right handed female presenting for evaluation of recent cardiac arrest.  The patient has past medical history of pulmonary sarcoidosis and pulm hypertension, PE/DVT, hypothyroidism, seizures on Keppra, A-fib on Eliquis and amiodarone and previous admission for respiratory failure. The patient was recently discharged on 08/18 for acute hypoxic respiratory failure at that time the patient was discharged on Bactrim. Recently came to the ED and found to be hypotensive, tachycardic and hypoxic. The patient subsequently decompensated and ended up having PE arrest s/p 2 rounds of CPR with ROSC achievement. The patient was intubated sedated started on Levophed and transferred to the ICU.   When seen this morning the patient is alert and will respond to obstruction or gangrene 07/10/2022        Surgical History:   Past Surgical History:   Procedure Laterality Date    ABDOMEN SURGERY      ischemic colitis    COLONOSCOPY  2008    healed colitis    COLONOSCOPY  2014    COLONOSCOPY  2015    severe colitis    COLONOSCOPY  10/24/2016    COLONOSCOPY  2017    ECHO COMPL W DOP COLOR FLOW  2015         ECHO COMPLETE  2013         FRACTURE SURGERY  2010    Tibial right    HEMORRHOID SURGERY  2016    KYPHOSIS SURGERY      For comp. fx T10    LUMBAR DISC SURGERY      OTHER SURGICAL HISTORY  11    removal r leg external fixator    OTHER SURGICAL HISTORY  2021    Partial Vaginectomy, Endometrial Biopsy    SIGMOIDOSCOPY N/A 3/19/2021    SIGMOIDOSCOPY HEMORRHOID BANDING performed by Heather Fraga MD at [facility] [employer] [facility]      application TSF  96    UPPER GASTROINTESTINAL ENDOSCOPY  2016    UPPER GASTROINTESTINAL ENDOSCOPY  2017        Family History:   Family History   Problem Relation Age of Onset    Diabetes Mother     Arthritis Mother     High Blood Pressure Mother     Arthritis Father     High Blood Pressure Father     Diabetes Father     High Blood Pressure Sister     Alcohol Abuse Sister     Substance Abuse Brother     Substance Abuse Sister     Coronary Art Dis Neg Hx     Breast Cancer Neg Hx     Ovarian Cancer Neg Hx        Social History:  Social History     Tobacco Use    Smoking status: Former     Packs/day: 0.50     Years: 25.00     Pack years: 12.50     Types: Cigarettes     Start date:      Quit date: 9/10/1996     Years since quittin.9    Smokeless tobacco: Never    Tobacco comments:     Patient quit smoking 26 yrs.ago.    Vaping Use    Vaping Use: Never used    Passive vaping exposure: Yes   Substance Use Topics    Alcohol use: Never     Alcohol/week: 0.0 standard drinks    Drug use: Not Currently     Comment: Tatiana spears        Current Medications: 80. 4  --    MCH 24.8*  --    MCHC 30.8*  --    RDW 16.3*  --      --    MPV 11.2  --    PH  --  7.386   PO2  --  82.6   PCO2  --  31.9*   HCO3  --  18.7*   BE  --  -5.6*   O2SAT  --  96.0   CRP 47.0*  --        Imaging  XR CHEST PORTABLE   Final Result   1. Improved bilateral areas of consolidation and atelectasis   2. Decreased pleural effusions   3. Residual consolidative process at the left lung base that continues to   silhouettes the diaphragm and heart border and short-term follow-up is   suggested         XR CHEST PORTABLE   Final Result   Progressing pulmonary edema. Interval clearing of left retrocardiac opacity. XR CHEST PORTABLE   Final Result   Central pulmonary artery hypertension. Left retrocardiac atelectasis or pneumonic consolidation. XR ABDOMEN FOR NG/OG/NE TUBE PLACEMENT   Final Result   Nasogastric tube identified tip in the stomach. Bowel gas pattern is   nonspecific. XR ABDOMEN FOR NG/OG/NE TUBE PLACEMENT   Final Result   Satisfactory position of NG tube. CT HEAD WO CONTRAST   Final Result   1. No acute intracranial abnormality. 2.  Signs of deep white matter small vessel disease, stable. CTA CHEST W CONTRAST   Final Result   1. Left perihilar and left lower lobe pneumonia. 2. Emphysematous changes with superimposed interstitial pulmonary fibrosis   3. Cardiomegaly with ectasia of the pulmonary trunk and pulmonary arteries   which can be seen with pulmonary hypertension   4. Contrast is seen within the right atrium and extends into the inferior   vena cava. These findings can be seen with right heart failure. 5. Satisfactory position of the endotracheal tube   6. Advanced degenerative changes of the spine. 7. There is no pulmonary embolus. CT ABDOMEN PELVIS W IV CONTRAST Additional Contrast? None   Final Result   1. Increased bibasilar areas of consolidation and or atelectasis.    2. The cecum is markedly distended and

## 2023-08-21 NOTE — CONSULTS
Department of Internal Medicine  Nephrology Attending Consult Note      Reason for Consult: Worsening renal function, patient known to us  Requesting Physician:  Emily Sewell MD    CHIEF COMPLAINT: Shortness of breath, status PEA arrest    History Obtained From:  electronic medical record    HISTORY OF PRESENT ILLNESS:  Briefly Mrs. Indra Alanis is a 78 y.o. female, well-known to our practice, who follows regularly with Dr. 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 Her creatinine level on admission was 0.8 mg/dL and since then has progressively increase up to 2.3 mg/dL, reason for this consultation.       Past Medical History:        Diagnosis Date    A-fib [facility])     follows with Dr My Almeida    Abnormal mammogram 2010    Acute on chronic respiratory failure ([address]) 05/05/2017    Anemia     Anemia due to chronic illness     Ankle fracture, left 2008    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[address]) 03/26/2007    Backache     Benign hypertension     CHF (congestive heart failure) ([facility])     Chronic back pain     Chronic kidney disease     stage 3    Chronic pain disorder     Chronic, continuous use of opioids     Compression fracture of thoracic vertebra (HCC)     T10    COPD (chronic obstructive pulmonary disease) (HCC)     Debility     Deformity of ankle and foot, acquired 01/31/2011    Depression     Diabetes insipidus ([address])     Diverticulitis gladys Gomez for IV contrast study  Monitor renal function    Thank you very much Dr. Darian Hunter, to participate in the care of Mrs. Eva Galaviz.

## 2023-08-21 NOTE — CONSULTS
Department of Internal Medicine  Infectious Diseases   Consult Note      Reason for Consult:  Pneumonia, resp failure       Requesting Physician:  Dr Celsa Hernandez:        Pt is known to ID . ( Seen by Dr Tammy Flannery ) . This is a 78 yrs old female with hx of COPD, Sarcoidosis, Pul HTN, C  diff infection , chronic resp failure - who was admitted to [address] [place] [address] ( 8/10-8/18 )for pneumonia - She was discharged on oral bactrim . However, pt returned to the ER < 24 hrs with shortness of breath . Pt was intubated for respiratory failure .    WBC 9.1 K   Procalcitonin 3.95  Started on IV vancomycin and zosyn       Past Medical History:      Past Medical History:   Diagnosis Date    A-fib [facility])     follows with Dr Vj Nowak    Abnormal mammogram 2010    Acute on chronic respiratory failure ([address]) 05/05/2017    Anemia     Anemia due to chronic illness     Ankle fracture, left 2008    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[address]) 03/26/2007    Backache     Benign hypertension     CHF (congestive heart failure) ([facility])     Chronic back pain     Chronic kidney disease     stage 3    Chronic pain disorder     Chronic, continuous use of opioids     Compression fracture of thoracic vertebra (HCC)     T10    COPD (chronic obstructive pulmonary disease) ([address])     Debility     Deformity of ankle and foot, acquired 01/31/2011    Depression     Diabetes insipidus ([address])     Diverticulitis     Dry eyes     Encephalopathy acute 05/05/2017    Gait disturbance     GERD (gastroesophageal reflux disease)     Hemorrhoids 01/13/2012    Hernia     History of blood transfusion approx 05/2017    History of Clostridium difficile     negative culture 12-15-15; resolved    Hyperlipidemia     Hypothyroidism     Ischemic colitis, enteritis, or enterocolitis ([address])     Long term (current) use of systemic steroids 07/10/2022    Long-term current use of steroids     Lumbar disc herniation     Myalgia and myositis, unspecified (SENOKOT) tablet 8.6 mg  1 tablet Oral Nightly Wes Rocha MD   8.6 mg at 08/20/23 2103    polyethylene glycol (GLYCOLAX) packet 17 g  17 g Oral Daily Wes Rocha MD   17 g at 08/21/23 0834    apixaban (ELIQUIS) tablet 5 mg  5 mg Oral BID Wes Rocha MD   5 mg at 08/21/23 0828    [Held by provider] hydrALAZINE (APRESOLINE) tablet 25 mg  25 mg Oral BID Wes Rocha MD        levETIRAcetam (KEPPRA) tablet 500 mg  500 mg Oral BID Wes Rocha MD   500 mg at 08/21/23 7786    levothyroxine (SYNTHROID) tablet 75 mcg  75 mcg Oral Daily Wes Rocha MD   75 mcg at 08/21/23 0405    [Held by provider] metoprolol succinate (TOPROL XL) extended release tablet 50 mg  50 mg Oral BID Wes Rocha MD        sodium chloride flush 0.9 % injection 5-40 mL  5-40 mL IntraVENous 2 times per day Wes Rocha MD   10 mL at 08/21/23 0836    sodium chloride flush 0.9 % injection 5-40 mL  5-40 mL IntraVENous PRN Wes Rocha MD        0.9 % sodium chloride infusion   IntraVENous PRN Wes Rocha MD        ondansetron (ZOFRAN-ODT) disintegrating tablet 4 mg  4 mg Oral Q8H PRN Wes Rocha MD        Or    ondansetron [facility]) injection 4 mg  4 mg IntraVENous Q6H PRN Wes Rocha MD        acetaminophen (TYLENOL) tablet 650 mg  650 mg Oral Q6H PRN Wes Rocha MD        Or    acetaminophen (TYLENOL) suppository 650 mg  650 mg Rectal Q6H PRN Wes Rocha MD        pantoprazole (PROTONIX) 40 mg in sodium chloride (PF) 0.9 % 10 mL injection  40 mg IntraVENous Daily Wes Rocha MD   40 mg at 08/21/23 5131    morphine (PF) injection 2 mg  2 mg IntraVENous Q3H PRN Nava Wheeler MD   2 mg at 08/20/23 0908       Allergies:  Patient has no known allergies.     Social History:      Social History     Socioeconomic History    Marital status: and pelvis -   Impression:        1. Increased bibasilar areas of consolidation and or atelectasis. 2. The cecum is markedly distended and stool-filled raising the possibility   of constipation. As this area appears isolated, a developing cecal volvulus   could give a similar appearance. Short-term follow-up is recommended after   medical therapy. 3. Multilevel degenerative disc disease   4. Compression fractures involving L5 and T12, relatively stable.    5. High position of the nasogastric tube and this may be advanced into the   body 5-6 cm.   6. Prominent umbilical hernia          IMPRESSION:     Respiratory failure - intubated   Pneumonia ( recurrent )  Elevated procalcitonin   COPD, Sarcoidosis, Pul HTN     RECOMMENDATIONS:     Stop zosyn   Zerbaxa 3 gram IV q 8 hrs  Hold bactrim  ( Cr 2.3)   Vanco to zyvox 600 mg IV q 12 hrs ( monitor renal function )   Await cx

## 2023-08-21 NOTE — PLAN OF CARE
Problem: Chronic Conditions and Co-morbidities  Goal: Patient's chronic conditions and co-morbidity symptoms are monitored and maintained or improved  8/21/2023 1546 by Ramiro Scott RN  Outcome: Progressing     Problem: Discharge Planning  Goal: Discharge to home or other facility with appropriate resources  8/21/2023 1546 by Ramiro Scott RN  Outcome: Progressing     Problem: Safety - Adult  Goal: Free from fall injury  8/21/2023 1546 by Ramiro Scott RN  Outcome: Progressing     Problem: Pain  Goal: Verbalizes/displays adequate comfort level or baseline comfort level  8/21/2023 1546 by Ramiro Scott RN  Outcome: Progressing     Problem: Skin/Tissue Integrity  Goal: Absence of new skin breakdown  Description: 1. Monitor for areas of redness and/or skin breakdown  2. Assess vascular access sites hourly  3. Every 4-6 hours minimum:  Change oxygen saturation probe site  4. Every 4-6 hours:  If on nasal continuous positive airway pressure, respiratory therapy assess nares and determine need for appliance change or resting period.   8/21/2023 1546 by Ramiro Soctt RN  Outcome: Progressing     Problem: ABCDS Injury Assessment  Goal: Absence of physical injury  8/21/2023 1546 by Ramiro Scott RN  Outcome: Progressing     Problem: Neurosensory - Adult  Goal: Achieves stable or improved neurological status  8/21/2023 1546 by Ramiro Scott RN  Outcome: Progressing     Problem: Neurosensory - Adult  Goal: Absence of seizures  8/21/2023 1546 by Ramiro Scott RN  Outcome: Progressing     Problem: Neurosensory - Adult  Goal: Remains free of injury related to seizures activity  8/21/2023 1546 by Ramiro Scott RN  Outcome: Progressing     Problem: Respiratory - Adult  Goal: Achieves optimal ventilation and oxygenation  8/21/2023 1546 by Ramiro Scott RN  Outcome: Progressing     Problem: Cardiovascular - Adult  Goal: Maintains optimal cardiac output and hemodynamic stability  8/21/2023 1546 by Ramiro Scott RN  Outcome: Progressing     Problem: Cardiovascular - Adult  Goal: Absence of cardiac dysrhythmias or at baseline  8/21/2023 1546 by Neda Goldberg, RN  Outcome: Progressing     Problem: Skin/Tissue Integrity - Adult  Goal: Skin integrity remains intact  8/21/2023 1546 by Neda Goldberg, RN  Outcome: Progressing     Problem: Skin/Tissue Integrity - Adult  Goal: Oral mucous membranes remain intact  8/21/2023 1546 by Neda Goldberg, RN  Outcome: Progressing     Problem: Safety - Medical Restraint  Goal: Remains free of injury from restraints (Restraint for Interference with Medical Device)  Description: INTERVENTIONS:  1. Determine that other, less restrictive measures have been tried or would not be effective before applying the restraint  2. Evaluate the patient's condition at the time of restraint application  3. Inform patient/family regarding the reason for restraint  4.  Q2H: Monitor safety, psychosocial status, comfort, nutrition and hydration  8/21/2023 1546 by Neda Goldberg, RN  Outcome: Progressing

## 2023-08-21 NOTE — PLAN OF CARE
Problem: Chronic Conditions and Co-morbidities  Goal: Patient's chronic conditions and co-morbidity symptoms are monitored and maintained or improved  Outcome: Progressing     Problem: Discharge Planning  Goal: Discharge to home or other facility with appropriate resources  Outcome: Progressing     Problem: Safety - Adult  Goal: Free from fall injury  Outcome: Progressing     Problem: Pain  Goal: Verbalizes/displays adequate comfort level or baseline comfort level  Outcome: Progressing     Problem: Skin/Tissue Integrity  Goal: Absence of new skin breakdown  Description: 1. Monitor for areas of redness and/or skin breakdown  2. Assess vascular access sites hourly  3. Every 4-6 hours minimum:  Change oxygen saturation probe site  4. Every 4-6 hours:  If on nasal continuous positive airway pressure, respiratory therapy assess nares and determine need for appliance change or resting period.   Outcome: Progressing     Problem: ABCDS Injury Assessment  Goal: Absence of physical injury  Outcome: Progressing     Problem: Neurosensory - Adult  Goal: Achieves stable or improved neurological status  Outcome: Progressing     Problem: Neurosensory - Adult  Goal: Absence of seizures  Outcome: Progressing     Problem: Neurosensory - Adult  Goal: Remains free of injury related to seizures activity  Outcome: Progressing     Problem: Neurosensory - Adult  Goal: Achieves maximal functionality and self care  Outcome: Progressing     Problem: Respiratory - Adult  Goal: Achieves optimal ventilation and oxygenation  Outcome: Progressing     Problem: Cardiovascular - Adult  Goal: Maintains optimal cardiac output and hemodynamic stability  Outcome: Progressing     Problem: Cardiovascular - Adult  Goal: Absence of cardiac dysrhythmias or at baseline  Outcome: Progressing     Problem: Skin/Tissue Integrity - Adult  Goal: Skin integrity remains intact  Outcome: Progressing     Problem: Skin/Tissue Integrity - Adult  Goal: Incisions,

## 2023-08-21 NOTE — PROCEDURES
[address] Report    MRN: [id number]  PATIENT NAME: Mohsen Mcintosh  DATE OF REPORT: 2023    DATE OF SERVICE: 2023  PHYSICIAN NAME: Jeff Ramirez DO  Referring Physician: Dr Fernando Cisneros      Patient's : 1955  Patient's Age: 78 y.o. Gender: female    PROCEDURE: Routine EEG with video      Clinical Interpretation: This abnormal study showed evidence of:    Moderate nonspecific cerebral dysfunction of the bilateral frontal lobes  A moderate to severe nonspecific encephalopathy    Structural abnormalities should be considered for the findings above and appropriate imaging obtained if clinically indicated. No seizures or epileptiform discharges were noted during this study. ____________________________  Electronically signed by: Jeff Ramirez DO, 2023 11:33 AM      Patient Clinical Information   Reason for Study: Patient undergoing evaluation for altered mental status  Patient State: Stuporous  Primary neurological diagnosis: Altered mental status  Primary indication for monitoring: Diagnosis of nonconvulsive seizures    Pertinent Medications and Treatments    dexmedetomidine     fentanyl     midazolam     levetiracetam     morphine       Sedatives administered: Yes  Intubated: Yes  Pharmacological paralytic: No    Reporting Period  Start of Study: 1128, 2023   End of Study:  1153, 2023       EEG Description  Digital video and scalp EEG monitoring was performed using the standard protocol for this laboratory. Scalp electrodes were applied in the international 10/20 system. Multiple digital montage arrangements were utilized for evaluation. EKG and video were recorded. Background:      Occipital rhythm (posterior dominant rhythm or PDR): Absent  Voltage: Medium  Organization: poor  Reactivity to eye opening/closure: not tested    Drowsiness: Absent  Sleep: Absent    Comments:  The background is composed primarily of generalized

## 2023-08-21 NOTE — CONSULTS
GENERAL SURGERY  CONSULT NOTE  8/21/2023    Physician Consulted: Dr. Rani Moffett  Reason for Consult: Hernia with suspected cecal volvulus  Referring Physician: Dr. Aleks Marin    HPI  Clay Costa is a 78 y.o. female who presents for evaluation of hernia with suspected volvulus. Pt was recently discharged from hospital on 8/18 when she was admitted for respiratory failure. She presented on 8/19 and decompensated in ED due to acute respiratory failure and received 2 rounds CPR with ROSC. She is currently intubated on pressors in the MICU. General surgery was consulted due to the ventral hernia she has had chronically and concern for obstruction due to lack of bowel movements this admission. Pt is s/p right hemicolectomy with ileocecal anastomosis due to ischemic colitis in 2008. Pt has been seen by our service in the past due to SBO. She recently had sigmoidoscopy with internal hemorrhoid banding in 2021 with Dr. Carlos A Haynes. Previous EGD in 2016 with Dr. Carlos A Haynes showed sliding hiatal hernia and GERD. Past medical history of respiratory failure, Afib on Eliquis, CKD3, diverticulosis. Past surgical history of right hemicolectomy. She has been referred to colorectal surgery for rectal prolapse in 12/22.       Past Medical History:   Diagnosis Date    A-fib [facility])     follows with Dr Trish Mohs    Abnormal mammogram 2010    Acute on chronic respiratory failure ([address]) 05/05/2017    Anemia     Anemia due to chronic illness     Ankle fracture, left 2008    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[address]) 03/26/2007    Backache     Benign hypertension     CHF (congestive heart failure) ([facility])     Chronic back pain     Chronic kidney disease     stage 3    Chronic pain disorder     Chronic, continuous use of opioids     Compression fracture of thoracic vertebra (HCC)     T10    COPD (chronic obstructive pulmonary disease) ([facility])     Debility     Deformity of ankle and foot, acquired 01/31/2011    Depression     Diabetes insipidus TIBIA / Jenny Matos LENGTHENING      application TSF  2/0/87    UPPER GASTROINTESTINAL ENDOSCOPY  1/4/2016    UPPER GASTROINTESTINAL ENDOSCOPY  07/26/2017       Medications Prior to Admission:    Prior to Admission medications    Medication Sig Start Date End Date Taking?  Authorizing Provider   sulfamethoxazole-trimethoprim (BACTRIM DS) 800-160 MG per tablet Take 2 tablets by mouth every 8 hours for 7 days 8/18/23 8/25/23  Isabelle Torres MD   diphenhydrAMINE (BENADRYL) 25 MG tablet Take 1 tablet by mouth every 6 hours as needed for Itching or Allergies (hold if lethargic) 8/17/23 9/16/23  Abhay Valles MD   hydrALAZINE (APRESOLINE) 25 MG tablet Take 1 tablet by mouth 2 times daily 8/15/23   Tung Santos MD   predniSONE (DELTASONE) 5 MG tablet Take 1 tablet by mouth daily for 10 days 8/16/23 8/26/23  Tung Santos MD   guaiFENesin-dextromethorphan (ROBITUSSIN DM) 100-10 MG/5ML syrup Take 5 mLs by mouth 3 times daily as needed for Cough 8/15/23 8/25/23  Abhay Valles MD   desmopressin (DDAVP) 0.1 MG tablet Take 2 tablets by mouth 2 times daily 8/15/23 11/13/23  Abhay Valles MD   polyvinyl alcohol (LIQUIFILM TEARS) 1.4 % ophthalmic solution Place 1 drop into both eyes as needed for Dry Eyes 8/15/23 9/14/23  Abhay Valles MD   famotidine (PEPCID) 20 MG tablet Take 1 tablet by mouth daily 8/16/23   Tung Santos MD   furosemide (LASIX) 20 MG tablet Take 2 tablets by mouth daily 8/4/23 9/3/23  Blanca Motley MD   amiodarone (CORDARONE) 200 MG tablet Take 1 tablet by mouth daily 7/20/23   Anderson Barrow MD   ferrous sulfate (IRON 325) 325 (65 Fe) MG tablet Take 1 tablet by mouth 2 times daily 7/20/23   Debra Narvaez MD   docusate sodium (COLACE) 100 MG capsule Take 1 capsule by mouth 2 times daily 7/20/23 10/18/23  Debra Narvaez MD   polyethylene glycol (GLYCOLAX) 17 g packet Take 17 g by mouth daily as needed for Constipation 7/20/23   Verline Dakin, MD   potassium chloride (KLOR-CON

## 2023-08-21 NOTE — PROGRESS NOTES
DAILY VENTILATOR WEANING ASSESSMENT PERFORMED    P/FIO2 Ratio =   83       (<100= do not Wean)                  Cs =    18                      (<32= Instability)  Plat. Pressure = 29  MV =4.95      Instabilities:       Cardiovascular =1       CNS =       Respiratory =       Metabolic =    Parameters    no    Wean per protocol  no    Ask Physician for a weaning plan yes    Additional Comments:     Performed by Ulices Abarca RCP RRT      Reference Table:    Cardiovascular     CNS      1. Mean BP less than or equal to 75   1. Neuromuscular blockade  2. Heart Rate greater than 130   2. RASS of -3, -4, -5  3. Myocardial Ischemia    3. RASS of +3, +4  4. Mechanical Assist Device    4. ICP greater than 15 or             Intracranial Hypertension         Respiratory      Metabolic  1. PEEP equal to or greater than 10cm/H20  1. Temp. (8hrs) less than 95 or > 103  2. Respiratory Rate greater than 35   2. WBC < 5000 or > 99167  3. Minute Volume greater than 15L  4. pH less than 7.30  5.  Deteriorating chest X-ray

## 2023-08-21 NOTE — ACP (ADVANCE CARE PLANNING)
Advance Care Planning   Healthcare Decision Maker:    Primary Decision Maker: [address]  [phone]    Secondary Decision Maker: Williams Dove - Child - [phone]    Click here to complete Healthcare Decision Makers including selection of the Healthcare Decision Maker Relationship (ie \"Primary\").

## 2023-08-21 NOTE — FLOWSHEET NOTE
Patient kicking legs out of bed, over bed rails, attempting to self extubate.  Patient is not verbally redirectable

## 2023-08-22 ENCOUNTER — APPOINTMENT (OUTPATIENT)
Dept: GENERAL RADIOLOGY | Age: 68
DRG: 870 | End: 2023-08-22
Payer: MEDICARE

## 2023-08-22 PROBLEM — E44.0 MODERATE PROTEIN-CALORIE MALNUTRITION (HCC): Chronic | Status: ACTIVE | Noted: 2023-08-22

## 2023-08-22 PROBLEM — Z51.5 PALLIATIVE CARE ENCOUNTER: Status: ACTIVE | Noted: 2023-08-22

## 2023-08-22 LAB
AADO2: 347 MMHG
ALBUMIN SERPL-MCNC: 3.1 G/DL (ref 3.5–5.2)
ALP SERPL-CCNC: 91 U/L (ref 35–104)
ALT SERPL-CCNC: 351 U/L (ref 0–32)
ANION GAP SERPL CALCULATED.3IONS-SCNC: 13 MMOL/L (ref 7–16)
ANION GAP SERPL CALCULATED.3IONS-SCNC: 13 MMOL/L (ref 7–16)
AST SERPL-CCNC: 176 U/L (ref 0–31)
B.E.: 0.2 MMOL/L (ref -3–3)
BASOPHILS # BLD: 0 K/UL (ref 0–0.2)
BASOPHILS NFR BLD: 0 % (ref 0–2)
BILIRUB SERPL-MCNC: 0.2 MG/DL (ref 0–1.2)
BUN SERPL-MCNC: 41 MG/DL (ref 6–23)
BUN SERPL-MCNC: 41 MG/DL (ref 6–23)
CALCIUM SERPL-MCNC: 8 MG/DL (ref 8.6–10.2)
CALCIUM SERPL-MCNC: 8.2 MG/DL (ref 8.6–10.2)
CHLORIDE SERPL-SCNC: 98 MMOL/L (ref 98–107)
CHLORIDE SERPL-SCNC: 98 MMOL/L (ref 98–107)
CO2 SERPL-SCNC: 24 MMOL/L (ref 22–29)
CO2 SERPL-SCNC: 29 MMOL/L (ref 22–29)
COHB: 1.3 % (ref 0–1.5)
CORTIS SERPL-MCNC: 157.1 UG/DL (ref 2.7–18.4)
CREAT SERPL-MCNC: 2.2 MG/DL (ref 0.5–1)
CREAT SERPL-MCNC: 2.6 MG/DL (ref 0.5–1)
CREAT UR-MCNC: 64.8 MG/DL (ref 29–226)
CRITICAL: ABNORMAL
DATE ANALYZED: ABNORMAL
DATE OF COLLECTION: ABNORMAL
EKG ATRIAL RATE: 100 BPM
EKG ATRIAL RATE: 278 BPM
EKG ATRIAL RATE: 59 BPM
EKG P AXIS: 27 DEGREES
EKG P AXIS: 56 DEGREES
EKG P-R INTERVAL: 160 MS
EKG P-R INTERVAL: 166 MS
EKG Q-T INTERVAL: 336 MS
EKG Q-T INTERVAL: 354 MS
EKG Q-T INTERVAL: 452 MS
EKG QRS DURATION: 116 MS
EKG QRS DURATION: 88 MS
EKG QRS DURATION: 92 MS
EKG QTC CALCULATION (BAZETT): 447 MS
EKG QTC CALCULATION (BAZETT): 456 MS
EKG QTC CALCULATION (BAZETT): 460 MS
EKG R AXIS: 109 DEGREES
EKG R AXIS: 81 DEGREES
EKG R AXIS: 82 DEGREES
EKG T AXIS: 10 DEGREES
EKG T AXIS: 51 DEGREES
EKG T AXIS: 55 DEGREES
EKG VENTRICULAR RATE: 100 BPM
EKG VENTRICULAR RATE: 113 BPM
EKG VENTRICULAR RATE: 59 BPM
EOSINOPHIL # BLD: 0 K/UL (ref 0.05–0.5)
EOSINOPHILS RELATIVE PERCENT: 0 % (ref 0–6)
ERYTHROCYTE [DISTWIDTH] IN BLOOD BY AUTOMATED COUNT: 16.4 % (ref 11.5–15)
FIO2: 62
FIO2: 75 %
GFR SERPL CREATININE-BSD FRML MDRD: 20 ML/MIN/1.73M2
GFR SERPL CREATININE-BSD FRML MDRD: 25 ML/MIN/1.73M2
GLUCOSE SERPL-MCNC: 112 MG/DL (ref 74–99)
GLUCOSE SERPL-MCNC: 125 MG/DL (ref 74–99)
HCO3: 27.2 MMOL/L (ref 22–26)
HCT VFR BLD AUTO: 24.1 % (ref 34–48)
HGB BLD-MCNC: 7.3 G/DL (ref 11.5–15.5)
HHB: 2.6 % (ref 0–5)
INR PPP: 1.8
LAB: ABNORMAL
LYMPHOCYTES NFR BLD: 0.28 K/UL (ref 1.5–4)
LYMPHOCYTES RELATIVE PERCENT: 3 % (ref 20–42)
Lab: ABNORMAL
MAGNESIUM SERPL-MCNC: 2.7 MG/DL (ref 1.6–2.6)
MCH RBC QN AUTO: 24.7 PG (ref 26–35)
MCHC RBC AUTO-ENTMCNC: 30.3 G/DL (ref 32–34.5)
MCV RBC AUTO: 81.7 FL (ref 80–99.9)
METHB: 0.5 % (ref 0–1.5)
MICROORGANISM SPEC CULT: ABNORMAL
MICROORGANISM SPEC CULT: ABNORMAL
MICROORGANISM SPEC CULT: NORMAL
MICROORGANISM/AGENT SPEC: ABNORMAL
MODE: AC
MODE: AC
MONOCYTES NFR BLD: 0.09 K/UL (ref 0.1–0.95)
MONOCYTES NFR BLD: 1 % (ref 2–12)
NEUTROPHILS NFR BLD: 97 % (ref 43–80)
NEUTS SEG NFR BLD: 10.13 K/UL (ref 1.8–7.3)
O2 CONTENT: 11.6 ML/DL
O2 DELIVERY DEVICE: ABNORMAL
O2 SATURATION: 97.4 % (ref 92–98.5)
O2HB: 95.6 % (ref 94–97)
OPERATOR ID: 8219
PARTIAL THROMBOPLASTIN TIME: 31.3 SEC (ref 24.5–35.1)
PATIENT TEMP: 37 C
PCO2: 57.4 MMHG (ref 35–45)
PEEP/CPAP: 8 CMH2O
PEEP: 8
PFO2: 1.44 MMHG/%
PH BLOOD GAS: 7.29 (ref 7.35–7.45)
PHOSPHATE SERPL-MCNC: 4.6 MG/DL (ref 2.5–4.5)
PLATELET # BLD AUTO: 189 K/UL (ref 130–450)
PMV BLD AUTO: 10.9 FL (ref 7–12)
PO2: 108 MMHG (ref 75–100)
POC HCO3: 26.6 MMOL/L (ref 22–26)
POC O2 SATURATION: 87.5 % (ref 92–98.5)
POC PCO2: 49.3 MM HG (ref 35–45)
POC PH: 7.34 (ref 7.35–7.45)
POC PO2: 57.7 MM HG (ref 60–80)
POSITIVE BASE EXCESS, ART: 0.6 MMOL/L (ref 0–3)
POTASSIUM SERPL-SCNC: 3.8 MMOL/L (ref 3.5–5)
POTASSIUM SERPL-SCNC: 4.5 MMOL/L (ref 3.5–5)
PROCALCITONIN SERPL-MCNC: 2.06 NG/ML (ref 0–0.08)
PROT SERPL-MCNC: 6 G/DL (ref 6.4–8.3)
PROTHROMBIN TIME: 19.3 SEC (ref 9.3–12.4)
RBC # BLD AUTO: 2.95 M/UL (ref 3.5–5.5)
RBC # BLD: ABNORMAL 10*6/UL
RI(T): 3.21
RR MECHANICAL: 12 B/MIN
SET RATE, POC: 26
SODIUM SERPL-SCNC: 135 MMOL/L (ref 132–146)
SODIUM SERPL-SCNC: 140 MMOL/L (ref 132–146)
SOURCE, BLOOD GAS: ABNORMAL
SPECIMEN DESCRIPTION: ABNORMAL
SPECIMEN DESCRIPTION: NORMAL
THB: 8.5 G/DL (ref 11.5–16.5)
TIDAL VOLUME: 400
TIME ANALYZED: 441
VT MECHANICAL: 350 ML
WBC OTHER # BLD: 10.5 K/UL (ref 4.5–11.5)

## 2023-08-22 PROCEDURE — 82803 BLOOD GASES ANY COMBINATION: CPT

## 2023-08-22 PROCEDURE — 6360000002 HC RX W HCPCS: Performed by: INTERNAL MEDICINE

## 2023-08-22 PROCEDURE — 85025 COMPLETE CBC W/AUTO DIFF WBC: CPT

## 2023-08-22 PROCEDURE — 2580000003 HC RX 258: Performed by: INTERNAL MEDICINE

## 2023-08-22 PROCEDURE — 84100 ASSAY OF PHOSPHORUS: CPT

## 2023-08-22 PROCEDURE — 6360000002 HC RX W HCPCS

## 2023-08-22 PROCEDURE — 2000000000 HC ICU R&B

## 2023-08-22 PROCEDURE — 82805 BLOOD GASES W/O2 SATURATION: CPT

## 2023-08-22 PROCEDURE — 80048 BASIC METABOLIC PNL TOTAL CA: CPT

## 2023-08-22 PROCEDURE — 80053 COMPREHEN METABOLIC PANEL: CPT

## 2023-08-22 PROCEDURE — 99233 SBSQ HOSP IP/OBS HIGH 50: CPT | Performed by: NURSE PRACTITIONER

## 2023-08-22 PROCEDURE — 99291 CRITICAL CARE FIRST HOUR: CPT | Performed by: INTERNAL MEDICINE

## 2023-08-22 PROCEDURE — 85730 THROMBOPLASTIN TIME PARTIAL: CPT

## 2023-08-22 PROCEDURE — 94003 VENT MGMT INPAT SUBQ DAY: CPT

## 2023-08-22 PROCEDURE — 71045 X-RAY EXAM CHEST 1 VIEW: CPT

## 2023-08-22 PROCEDURE — 82533 TOTAL CORTISOL: CPT

## 2023-08-22 PROCEDURE — 99221 1ST HOSP IP/OBS SF/LOW 40: CPT | Performed by: NURSE PRACTITIONER

## 2023-08-22 PROCEDURE — 99232 SBSQ HOSP IP/OBS MODERATE 35: CPT | Performed by: FAMILY MEDICINE

## 2023-08-22 PROCEDURE — 84145 PROCALCITONIN (PCT): CPT

## 2023-08-22 PROCEDURE — 6360000002 HC RX W HCPCS: Performed by: NURSE PRACTITIONER

## 2023-08-22 PROCEDURE — 85610 PROTHROMBIN TIME: CPT

## 2023-08-22 PROCEDURE — 94640 AIRWAY INHALATION TREATMENT: CPT

## 2023-08-22 PROCEDURE — 83735 ASSAY OF MAGNESIUM: CPT

## 2023-08-22 PROCEDURE — 6370000000 HC RX 637 (ALT 250 FOR IP): Performed by: INTERNAL MEDICINE

## 2023-08-22 PROCEDURE — 6370000000 HC RX 637 (ALT 250 FOR IP)

## 2023-08-22 PROCEDURE — 99231 SBSQ HOSP IP/OBS SF/LOW 25: CPT | Performed by: SURGERY

## 2023-08-22 PROCEDURE — C9113 INJ PANTOPRAZOLE SODIUM, VIA: HCPCS

## 2023-08-22 PROCEDURE — 2580000003 HC RX 258

## 2023-08-22 PROCEDURE — P9047 ALBUMIN (HUMAN), 25%, 50ML: HCPCS | Performed by: INTERNAL MEDICINE

## 2023-08-22 PROCEDURE — A4216 STERILE WATER/SALINE, 10 ML: HCPCS

## 2023-08-22 RX ORDER — ALBUMIN (HUMAN) 12.5 G/50ML
25 SOLUTION INTRAVENOUS EVERY 8 HOURS
Status: COMPLETED | OUTPATIENT
Start: 2023-08-22 | End: 2023-08-23

## 2023-08-22 RX ORDER — SODIUM CHLORIDE 9 MG/ML
INJECTION, SOLUTION INTRAVENOUS CONTINUOUS
Status: DISCONTINUED | OUTPATIENT
Start: 2023-08-22 | End: 2023-08-24

## 2023-08-22 RX ORDER — 0.9 % SODIUM CHLORIDE 0.9 %
250 INTRAVENOUS SOLUTION INTRAVENOUS ONCE
Status: COMPLETED | OUTPATIENT
Start: 2023-08-22 | End: 2023-08-22

## 2023-08-22 RX ADMIN — CHLORHEXIDINE GLUCONATE 15 ML: 1.2 RINSE ORAL at 09:30

## 2023-08-22 RX ADMIN — ALBUMIN (HUMAN) 25 G: 0.25 INJECTION, SOLUTION INTRAVENOUS at 10:27

## 2023-08-22 RX ADMIN — STANDARDIZED SENNA CONCENTRATE 8.6 MG: 8.6 TABLET ORAL at 20:17

## 2023-08-22 RX ADMIN — HYDROCORTISONE SODIUM SUCCINATE 50 MG: 100 INJECTION, POWDER, FOR SOLUTION INTRAMUSCULAR; INTRAVENOUS at 03:35

## 2023-08-22 RX ADMIN — ACETYLCYSTEINE 3520 MG: 200 INJECTION, SOLUTION INTRAVENOUS at 14:46

## 2023-08-22 RX ADMIN — ARFORMOTEROL TARTRATE 15 MCG: 15 SOLUTION RESPIRATORY (INHALATION) at 21:05

## 2023-08-22 RX ADMIN — APIXABAN 5 MG: 5 TABLET, FILM COATED ORAL at 09:30

## 2023-08-22 RX ADMIN — SODIUM CHLORIDE 250 ML: 9 INJECTION, SOLUTION INTRAVENOUS at 09:48

## 2023-08-22 RX ADMIN — APIXABAN 5 MG: 5 TABLET, FILM COATED ORAL at 20:17

## 2023-08-22 RX ADMIN — Medication 10 ML: at 10:34

## 2023-08-22 RX ADMIN — LEVETIRACETAM 500 MG: 500 TABLET, FILM COATED ORAL at 20:17

## 2023-08-22 RX ADMIN — BUDESONIDE INHALATION 500 MCG: 0.5 SUSPENSION RESPIRATORY (INHALATION) at 21:05

## 2023-08-22 RX ADMIN — SODIUM CHLORIDE, PRESERVATIVE FREE 40 MG: 5 INJECTION INTRAVENOUS at 09:30

## 2023-08-22 RX ADMIN — CEFTOLOZANE AND TAZOBACTAM 500 MG: 1; .5 INJECTION, POWDER, LYOPHILIZED, FOR SOLUTION INTRAVENOUS at 03:37

## 2023-08-22 RX ADMIN — HYDROCORTISONE SODIUM SUCCINATE 50 MG: 100 INJECTION, POWDER, FOR SOLUTION INTRAMUSCULAR; INTRAVENOUS at 09:30

## 2023-08-22 RX ADMIN — BUDESONIDE INHALATION 500 MCG: 0.5 SUSPENSION RESPIRATORY (INHALATION) at 07:52

## 2023-08-22 RX ADMIN — POLYETHYLENE GLYCOL 3350 17 GRAM ORAL POWDER PACKET 17 G: at 09:31

## 2023-08-22 RX ADMIN — ACETYLCYSTEINE 10580 MG: 200 INJECTION, SOLUTION INTRAVENOUS at 12:33

## 2023-08-22 RX ADMIN — ARFORMOTEROL TARTRATE 15 MCG: 15 SOLUTION RESPIRATORY (INHALATION) at 07:52

## 2023-08-22 RX ADMIN — LEVOTHYROXINE SODIUM 75 MCG: 0.07 TABLET ORAL at 06:17

## 2023-08-22 RX ADMIN — LEVETIRACETAM 500 MG: 500 TABLET, FILM COATED ORAL at 09:30

## 2023-08-22 RX ADMIN — CEFTOLOZANE AND TAZOBACTAM 750 MG: 1; .5 INJECTION, POWDER, LYOPHILIZED, FOR SOLUTION INTRAVENOUS at 20:04

## 2023-08-22 RX ADMIN — ALBUMIN (HUMAN) 25 G: 0.25 INJECTION, SOLUTION INTRAVENOUS at 18:35

## 2023-08-22 RX ADMIN — CHLORHEXIDINE GLUCONATE 15 ML: 1.2 RINSE ORAL at 20:17

## 2023-08-22 RX ADMIN — HYDROCORTISONE SODIUM SUCCINATE 50 MG: 100 INJECTION, POWDER, FOR SOLUTION INTRAMUSCULAR; INTRAVENOUS at 21:42

## 2023-08-22 RX ADMIN — AMIODARONE HYDROCHLORIDE 200 MG: 200 TABLET ORAL at 09:30

## 2023-08-22 RX ADMIN — Medication 10 ML: at 20:17

## 2023-08-22 RX ADMIN — HYDROCORTISONE SODIUM SUCCINATE 50 MG: 100 INJECTION, POWDER, FOR SOLUTION INTRAMUSCULAR; INTRAVENOUS at 16:48

## 2023-08-22 RX ADMIN — LINEZOLID 600 MG: 600 INJECTION, SOLUTION INTRAVENOUS at 10:33

## 2023-08-22 RX ADMIN — ACETYLCYSTEINE 7060 MG: 200 INJECTION, SOLUTION INTRAVENOUS at 19:35

## 2023-08-22 RX ADMIN — SODIUM CHLORIDE: 9 INJECTION, SOLUTION INTRAVENOUS at 10:23

## 2023-08-22 RX ADMIN — CEFTOLOZANE AND TAZOBACTAM 500 MG: 1; .5 INJECTION, POWDER, LYOPHILIZED, FOR SOLUTION INTRAVENOUS at 11:08

## 2023-08-22 ASSESSMENT — PULMONARY FUNCTION TESTS
PIF_VALUE: 31
PIF_VALUE: 38
PIF_VALUE: 32
PIF_VALUE: 34
PIF_VALUE: 30
PIF_VALUE: 38
PIF_VALUE: 26
PIF_VALUE: 35
PIF_VALUE: 24
PIF_VALUE: 26
PIF_VALUE: 29
PIF_VALUE: 37
PIF_VALUE: 38
PIF_VALUE: 35
PIF_VALUE: 24
PIF_VALUE: 25
PIF_VALUE: 32
PIF_VALUE: 33
PIF_VALUE: 29
PIF_VALUE: 32
PIF_VALUE: 34
PIF_VALUE: 25
PIF_VALUE: 25
PIF_VALUE: 30
PIF_VALUE: 29
PIF_VALUE: 34
PIF_VALUE: 25
PIF_VALUE: 34

## 2023-08-22 ASSESSMENT — PAIN SCALES - GENERAL
PAINLEVEL_OUTOF10: 0
PAINLEVEL_OUTOF10: 2
PAINLEVEL_OUTOF10: 0
PAINLEVEL_OUTOF10: 1
PAINLEVEL_OUTOF10: 0

## 2023-08-22 NOTE — PROGRESS NOTES
[facility]  Neurology Follow up    Date:  8/22/2023  Patient Name:  Silvia Davalos  YOB: 1955  MRN: [id number]     Chief Complaint: Acute encephalopathy s/p cardiac arrest    History obtained from: EMR     PM of seizures on Keppra, sarcoidosis, A-fib on Eliquis, hypertension, PE/DVT, hypothyroidism, anemia of CKD    Assessment  Acute encephalopathy s/p cardiac arrest   --- the patient recently had PEA arrest s/p 2 rounds of CPR with ROSC achieved  --- Currently not sedated on fentanyl, patient opens eyes to loud voice, unable to follow commands   --- EEG showed no seizures, did show moderate encephalopathy       Plan  Wean sedation as deemed appropriate  MRI brain to assess possible injury  Continue to monitor mentation  Sedation may be contributing to the patient's current mental status, as ICU team is weaning sedation neurology will continue to assess      History of Present Illness:  Presenting for evaluation of recent cardiac arrest. The patient was recently discharged on 08/18/23 for acute hypoxic respiratory failure at that time the patient was discharged on Bactrim. Recently came to the ED and found to be hypotensive, tachycardic and hypoxic. The patient subsequently decompensated and ended up having PE arrest s/p 2 rounds of CPR with ROSC achievement. The patient was intubated sedated started on Levophed and transferred to the ICU. Pt lying in bed. RN at bedside. MRI brain not completed yet. Pt opens eyes to loud voice, grimaces to pain in extremities, does not follow commands.     Medical History:   Past Medical History:   Diagnosis Date    A-fib [facility])     follows with Dr Vanessa Walton    Abnormal mammogram 2010    Acute on chronic respiratory failure ([address]) 05/05/2017    Anemia     Anemia due to chronic illness     Ankle fracture, left 2008    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[address]) 03/26/2007    Backache     Benign hypertension     CHF (congestive heart arformoterol tartrate (BROVANA) nebulizer solution 15 mcg  15 mcg Nebulization BID RT Ramy Kirby MD   15 mcg at 08/22/23 0710    [Held by provider] desmopressin (DDAVP) tablet 200 mcg  200 mcg Oral BID Ramy Kirby MD        [facility]) tablet 8.6 mg  1 tablet Oral Nightly Ramy Kirby MD   8.6 mg at 08/21/23 2044    polyethylene glycol (GLYCOLAX) packet 17 g  17 g Oral Daily Ramy Kirby MD   17 g at 08/22/23 0931    apixaban (ELIQUIS) tablet 5 mg  5 mg Oral BID Ramy Kirby MD   5 mg at 08/22/23 0930    [Held by provider] hydrALAZINE (APRESOLINE) tablet 25 mg  25 mg Oral BID Ramy Kirby MD        levETIRAcetam (KEPPRA) tablet 500 mg  500 mg Oral BID Ramy Kirby MD   500 mg at 08/22/23 0930    levothyroxine (SYNTHROID) tablet 75 mcg  75 mcg Oral Daily Ramy Kirby MD   75 mcg at 08/22/23 0617    [Held by provider] metoprolol succinate (TOPROL XL) extended release tablet 50 mg  50 mg Oral BID Ramy Kirby MD        sodium chloride flush 0.9 % injection 5-40 mL  5-40 mL IntraVENous 2 times per day Ramy Kirby MD   10 mL at 08/22/23 1034    sodium chloride flush 0.9 % injection 5-40 mL  5-40 mL IntraVENous PRN Ramy Kirby MD        0.9 % sodium chloride infusion   IntraVENous PRN Ramy Kirby MD 10 mL/hr at 08/22/23 0651 Rate Verify at 08/22/23 0651    ondansetron (ZOFRAN-ODT) disintegrating tablet 4 mg  4 mg Oral Q8H PRN Ramy Kirby MD        Or    ondansetron TELEVA [facility]) injection 4 mg  4 mg IntraVENous Q6H PRN Ramy Kirby MD        acetaminophen (TYLENOL) tablet 650 mg  650 mg Oral Q6H PRN Ramy Kirby MD        Or    acetaminophen (TYLENOL) suppository 650 mg  650 mg Rectal Q6H PRN Ramy Kirby MD        pantoprazole (PROTONIX) 40 mg in sodium chloride (PF) 0.9 % 10 mL injection  40 mg

## 2023-08-22 NOTE — PROGRESS NOTES
PEDRO PROGRESS NOTE      Chief complaint: Follow-up of ongoing antibiotic treatment for Stenotrophomonas pneumonia    The patient is a 78 y.o. female with history of atrial fibrillation, CHF, COPD, hypertension, C. difficile infection, ischemic colitis s/p right hemicolectomy with ileocecal anastomosis in 2008, pulmonary sarcoidosis, severe pulmonary hypertension, chronic hypoxic respiratory failure on continuous 4Lpm supplemental oxygen, recurrent hospital admissions for shortness of breath and more recently admitted from 08/09-08/18 for acute hypoxic respiratory failure secondary to Stenotrophomonas maltophilia pneumonia for which she was discharged on 7 days of high-dose Bactrim, readmitted on 08/19 with shortness of breath then went into PEA arrest with ROSC after 2 rounds of CPR. On admission, she was afebrile with no leukocytosis. Chest CTA showed left perihilar and left lower lobe pneumonia, emphysematous changes with superimposed interstitial pulmonary fibrosis, cardiomegaly with ectasia of pulmonary trunk and pulmonary arteries. CT abdomen and pelvis showed markedly distended cecum filled with stools, multilevel degenerative disc disease, L5 and T12 compression fractures. Respiratory pathogen PCR panel, urine Streptococcus pneumoniae and Legionella antigens, MRSA nares culture were negative. Sputum Gram stain and culture showed few polymorphonuclear leukocytes, rare epithelial cells, rare gram-positive cocci, rare gram-positive rods, normal tanesha. Urinalysis showed insignificant pyuria of 0-5 WBC with urine culture showing mixed tanesha. She received a dose of cefepime and tigecycline on admission then was given piperacillin-tazobactam and vancomycin from 08/20 to 08/21 then switched to linezolid and ceftazidime-tazobactam on 08/21. Subjective: Patient was seen and examined. She remains in critical condition, intubated and unresponsive. Afebrile.       Objective:    Vitals:    08/22/23 1600   BP:

## 2023-08-22 NOTE — PROGRESS NOTES
degenerative changes of the spine. 7. There is no pulmonary embolus. CT ABDOMEN PELVIS W IV CONTRAST Additional Contrast? None   Final Result   1. Increased bibasilar areas of consolidation and or atelectasis. 2. The cecum is markedly distended and stool-filled raising the possibility   of constipation. As this area appears isolated, a developing cecal volvulus   could give a similar appearance. Short-term follow-up is recommended after   medical therapy. 3. Multilevel degenerative disc disease   4. Compression fractures involving L5 and T12, relatively stable. 5. High position of the nasogastric tube and this may be advanced into the   body 5-6 cm.   6. Prominent umbilical hernia         XR ABDOMEN FOR NG/OG/NE TUBE PLACEMENT   Final Result   1. The tip of the NG tube appears to be 6 cm inferior to the gastroesophageal   junction. The side hole is seen at the level of the gastroesophageal   junction. The NG tube should be advanced by an additional 5-10 cm if   possible. XR CHEST PORTABLE   Final Result   1. Increased interstitial prominence within the lungs concerning for   developing pulmonary edema or fluid overload   2. Increased bibasilar linear opacities suggest worsening atelectasis. 3. The position and alignment of the tubes and catheters appear within the   normal range         XR CHEST PORTABLE   Final Result   Cardiomediastinal silhouette appears enlarged. Prominent hilar/perihilar structures probably reflecting prominent pulmonary   vessels however cannot entirely exclude nodule/mass. Would advise a   follow-up PA chest radiograph. Mild bibasilar atelectasis or infiltrate. No gross pneumothorax. Deformity of the right humeral head.          XR CHEST PORTABLE    (Results Pending)   XR CHEST PORTABLE    (Results Pending)   MRI BRAIN WO CONTRAST    (Results Pending)   XR CHEST PORTABLE    (Results Pending)       A/P:  Principal Problem:    Cardiac arrest [facility])  Active Problems:    Shortness of breath  Resolved Problems:    * No resolved hospital problems. *      Acute hypoxic respiratory failure  Status post cardiac arrest  Left lower lobe pneumonia  Respiratory acidosis  Severe pulmonary hypertension  Pulmonary sarcoidosis  History of moderate COPD, stage II by gold classification on 5 to 6 L at home  CTA pulm negative for pulmonary embolism  Lactic acid within normal limits  Delta troponin negative and EKG negative for acute ischemic changes  ProBNP trending up to 16,648 from 2,839. Chest x-ray showing possible fluid overload. Lasix currently held due to worsening kidney function. Monitor volume status   Respiratory panel negative  Legionella antigen and strep pneumo antigen negative  Respiratory culture showing rare G+ cocci and rods, epithelial cells and polymorphonuclear leucocytes on prelim report  Urine culture showing mixed G+ tanesha 10-50,000  ID on board. Zosyn stopped and bactrium held. Started on Linezolid and Zerbaxa per ID  Blood culture - no growth in 2 days  Continue solumedrol 40 mg IV every 8 hours  Levophed weaned down to 2 mcg. Holding Toprol XL and hydralazine. Initial procalcitonin 0.17, trending up to 3.95  CRP 47  ABG showing respiratory acidosis with metabolic compensation  Intubated. Sedated with fentanyl and versed. Monitor daily chest x-ray and ABGs. Management per ICU     Acute on chronic kidney disease  Hyponatremia and hypochloremia  History of diabetes insipidus  Lasix held due to worsening kidney function  Urine studies and osmolality, consider IVFs  Nephro on board - will resume home desmopressin if sodium levels increase. Hydrocortisone 100 mg IV Q8   Strict I/Os  Cr 2.6, up trending from 2.3  Management per ICU    Anoxic Encephalopathy   Neuro on board   EEG negative for seizure activity.  Remarkable for nonspecific bifrontal encephalopathy  Continue Keppra   Pending brain MRI    Umbilical Hernia  Constipation  Continue

## 2023-08-22 NOTE — PLAN OF CARE
Problem: Chronic Conditions and Co-morbidities  Goal: Patient's chronic conditions and co-morbidity symptoms are monitored and maintained or improved  8/21/2023 2217 by Troy Pugh RN  Outcome: Progressing     Problem: Discharge Planning  Goal: Discharge to home or other facility with appropriate resources  8/21/2023 2217 by Troy Pugh RN  Outcome: Progressing     Problem: Safety - Adult  Goal: Free from fall injury  8/21/2023 2217 by Troy Pugh RN  Outcome: Progressing     Problem: Pain  Goal: Verbalizes/displays adequate comfort level or baseline comfort level  8/21/2023 2217 by Troy Pugh RN  Outcome: Progressing     Problem: Skin/Tissue Integrity  Goal: Absence of new skin breakdown  Description: 1. Monitor for areas of redness and/or skin breakdown  2. Assess vascular access sites hourly  3. Every 4-6 hours minimum:  Change oxygen saturation probe site  4. Every 4-6 hours:  If on nasal continuous positive airway pressure, respiratory therapy assess nares and determine need for appliance change or resting period.   8/21/2023 2217 by Troy Pugh RN  Outcome: Progressing     Problem: ABCDS Injury Assessment  Goal: Absence of physical injury  8/21/2023 2217 by Troy Pugh RN  Outcome: Progressing     Problem: Neurosensory - Adult  Goal: Achieves stable or improved neurological status  8/21/2023 2217 by Troy Pugh RN  Outcome: Progressing     Problem: Neurosensory - Adult  Goal: Absence of seizures  8/21/2023 2217 by Troy Pugh RN  Outcome: Progressing     Problem: Neurosensory - Adult  Goal: Remains free of injury related to seizures activity  8/21/2023 2217 by Troy Pugh RN  Outcome: Progressing     Problem: Neurosensory - Adult  Goal: Achieves maximal functionality and self care  Outcome: Progressing     Problem: Respiratory - Adult  Goal: Achieves optimal ventilation and oxygenation  8/21/2023 2217 by Troy Pugh

## 2023-08-22 NOTE — PROGRESS NOTES
Stage  CI HR MAP TPRI SVI   Baseline 2.6 95 66 1997 28   Challenge 2.9 92 76 2100 33   Result (%?) 9.5 -2.7 15.2 5.2 18.5

## 2023-08-22 NOTE — PROGRESS NOTES
Comprehensive Nutrition Assessment    Type and Reason for Visit:  Initial (new TF auto assess)    Nutrition Recommendations/Plan:   Continue NPO. Will modify TF to better meet estimated needs. Regimen at goal meets 100% of est energy needs and 93% est protein needs. Renal TF formula is w/ consideration for current renal status/labs. Will continue to monitor and provide nutrition recs as appropriate. Recommend:  Renal Formula (Nepro 1.8) @30ml/hr x 23 hr/d (hold 30 mins before and after synthroid). Will provide: 690ml TV, 1242 kcal, 56g pro, 500ml free water. Flush per critical care. Monitor electrolytes and replace PRN. Malnutrition Assessment:  Malnutrition Status: Moderate malnutrition (08/22/23 1034)    Context:  Chronic Illness     Findings of the 6 clinical characteristics of malnutrition:  Energy Intake:  75% or less estimated energy requirements for 1 month or longer  Weight Loss:  Unable to assess (BAYRON  d/t Hx of CKD/CHF and possible fluid shifts + varied wt hx)     Body Fat Loss:  Mild body fat loss Orbital, Triceps   Muscle Mass Loss:  Mild muscle mass loss Temples (temporalis), Clavicles (pectoralis & deltoids), Scapula (trapezius)  Fluid Accumulation:  No significant fluid accumulation     Strength:  Not Performed    Nutrition Assessment:    Pt. admitted in MICU s/p cardiac arrest. Admitted w/ acute encephalopathy, acute respiratory failure/PNA, septic shock, shock liver, IRMA on CKD and DI. Pt. remians intubated/sedated on pressor support. GS consulted/following d/t ventral hernia. Kelli following-MRI pending. PMHx of COPD/CKD/CHF/seizures/diabetes insipidus. Hx of colectomy for ischemic colitis (08'), SBO (5/16), C-Diff ; hx of moderate malnutrition ; pt does meet criteria for moderate malnutrition. Pt. NPO w/ TF. Will modfiy TF to better meet estimated needs and continue to monitor.     Nutrition Related Findings:    Intubated/sedated on low dose pressor x1, MAP 70, +I/O(1.1L), elevated Feeding (Modify TF: Recommed: Renal Formula (Nepro 1.8) @30ml/hr x 23 hr/d (hold 30 mins before and after synthroid). Will provide: 690ml TV, 1242 kcal, 56g pro, 500ml free water)  Nutrition Education/Counseling: No recommendation at this time  Coordination of Nutrition Care: Continue to monitor while inpatient       Goals:     Goals: Tolerate nutrition support at goal rate, within 2 days       Nutrition Monitoring and Evaluation:   Behavioral-Environmental Outcomes: None Identified  Food/Nutrient Intake Outcomes: Enteral Nutrition Intake/Tolerance  Physical Signs/Symptoms Outcomes: Biochemical Data, Chewing or Swallowing, GI Status, Fluid Status or Edema, Nutrition Focused Physical Findings, Skin, Weight    Discharge Planning:     Too soon to determine     Marie López RD  Contact: ext [phone]

## 2023-08-22 NOTE — CONSULTS
Palliative Care Department  [phone]  Palliative Care Initial Consult  Provider Matias January, APRN - ANDREAS     Silvia Davalos  [id number]  Hospital Day: 4  Date of Initial Consult: 8/22/2023  Referring Provider: Nathaniel Ramesh MD   Palliative Medicine was consulted for assistance with: Discussion    HPI:   Silvia Davalos is a 78 y.o. with a medical history of  pulmonary sarcoidosis, atrial fibrillation on Eliquis, pulmonary hypertension, diabetes insipidus, hypothyroidism, hypertension, chronic kidney disease stage IIIa and combined systolic/diastolic heart failure who was admitted on 8/19/2023 from home with a CHIEF COMPLAINT of shortness of breath. Patient was recently discharged from [facility] (08/18/23) due to acute hypoxic respiratory failure. Patient has had numerous hospital admissions for the same complaint. Based on discharge summary she was offered discharge to [facility]SNF, [place], but declined and was eventually discharged home with Bactrim. In the ED, patient was hypotensive, tachycardic, and hypoxic. Patient decompensated and went into PEA, ROSC achieved. Patient was intubated and transferred to ICU for further medical management. CT head negative for any acute intracranial abnormality. CTA pulmonary remarkable for left perihilar and left lower lobe pneumonia. , emphysematous changes with superimposed interstitial pulmonary fibrosis, cardiomegaly with ectasia of the pulmonary trunk and pulmonary arteries which can be seen with pulmonary hypertension. Contrast is seen within the right atrium and extends into the inferior vena cava. These findings can be seen with right heart failure. There is no pulmonary embolus. CT abdomen positive for constipation and prominent umbilical hernia. EEG: showed moderate encephalopathy, moderate non specific cerebral dysfunction of bilateral frontal lobes, no seizures. Palliative medicine consulted for further assistance.        ASSESSMENT/PLAN:

## 2023-08-22 NOTE — PROGRESS NOTES
[address] [facility]             Pulmonary & Critical Care Medicine                MICU Progress Note                 Written by: Sang Mendoza MD  Name: Goldy Beckwith : 1955       Age: 78 y.o. MR/Act #    : [id number],  Billing  #    : [id number]   Admit Date: 2023  2:21 AM LOS: 3,   Hospital Day: 4 Room #      : 7711/8245-I   PCP            : Reg Watts MD,   Referred by: Clementine Clay MD ICU Attending: MD Fredrick Rios date: 2023 10:43 AM   ICU Days:       4 Vent Days:    4 LOS: 3,                          Reason for ICU admission           Chief Complaint   Patient presents with    Shortness of Breath     (Started earlier last night, does wear home O2 on 6L)                          Brief HPI, Presentation & Synopsis                       79-year-old female with past medical history of acute on chronic respiratory failure, Pseudomonas pneumonia, stenotrophomonas pneumonia, pulmonary hypertension, pulmonary sarcoidosis IRMA on top of CKD, hyponatremia, anemia of chronic disease presented to the ER where she had PEA with cardiac arrest.  2 rounds of CPR and ROSC was achieved. She was intubated during the cardiac arrest.  Hypotension was noted and started on Levophed drip. CTA chest showed left-sided pneumonia. Admitted in ICU for septic shock, cardiac arrest and respiratory failure due to pneumonia.     Active problem list of yesterday:  Active Hospital Problems    Diagnosis Date Noted    Moderate protein-calorie malnutrition ([address]) [E44.0] 2023    Cardiac arrest ([address]) [I46.9] 2023    Shortness of breath [R06.02] 2023                           Events of last night                      Continues to be ventilator dependent                      Subjective   2023               Continues to be Levophed dependent                      Objective  2023               Vitals:    23 0800   BP: 87/60   Pulse: 83   Resp: 16 reading provider will be dictating this exam?->CRC FINDINGS: AP supine portable chest shows ET tube with distal tip, 2.7 cm above the jaz. There is an enteric tube crossing the diaphragm. There is left retrocardiac opacity. There are prominent bilateral central pulmonary arteries. There is old healed fracture deformity of the right humeral head. There is degenerative osteoarthritis of the left shoulder joint. Central pulmonary artery hypertension. Left retrocardiac atelectasis or pneumonic consolidation. XR CHEST PORTABLE    Result Date: 8/19/2023  EXAMINATION: ONE XRAY VIEW OF THE CHEST 8/19/2023 8:50 am COMPARISON: 10/19/2023 HISTORY: ORDERING SYSTEM PROVIDED HISTORY: et tube TECHNOLOGIST PROVIDED HISTORY: Reason for exam:->et tube FINDINGS: The tip of the endotracheal tube is 3.5 cm above the jaz. There is increased bibasilar parenchymal densities concerning for pneumonia and or atelectasis. This process now silhouettes the left diaphragm. The heart border appears stable. There is increased interstitial prominence within the lungs concerning for developing pulmonary edema or fluid overload. The tip of the nasogastric tube projects over the body the stomach there are post treatment changes of the thoracic spine noted incidentally. There are severe degenerative and posttraumatic changes of the shoulders worse on the right than left. 1. Increased interstitial prominence within the lungs concerning for developing pulmonary edema or fluid overload 2. Increased bibasilar linear opacities suggest worsening atelectasis. 3. The position and alignment of the tubes and catheters appear within the normal range     XR CHEST PORTABLE    Result Date: 8/19/2023  EXAMINATION: ONE XRAY VIEW OF THE CHEST 8/19/2023 2:37 am COMPARISON: None.  HISTORY: ORDERING SYSTEM PROVIDED HISTORY: SOB TECHNOLOGIST PROVIDED HISTORY: Reason for exam:->SOB What reading provider will be dictating this exam?->CRC pulmonary embolus. XR ABDOMEN FOR NG/OG/NE TUBE PLACEMENT    Result Date: 8/20/2023  Nasogastric tube identified tip in the stomach. Bowel gas pattern is nonspecific. XR ABDOMEN FOR NG/OG/NE TUBE PLACEMENT    Result Date: 8/19/2023  Satisfactory position of NG tube. XR ABDOMEN FOR NG/OG/NE TUBE PLACEMENT    Result Date: 8/19/2023  1. The tip of the NG tube appears to be 6 cm inferior to the gastroesophageal junction. The side hole is seen at the level of the gastroesophageal junction. The NG tube should be advanced by an additional 5-10 cm if possible. EKG  :     Rhythm Strip :Normal Sinus Rhythm    ECHO  :                        Assessment and Plan of care     Diagnosis:  Cardiac arrest  Suspected anoxic encephalopathy  Acute on chronic respiratory failure with hypoxia and hypercapnia  Intubated during cardiac arrest  Severe pulmonary hypertension. 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 EEG showed nonspecific bifrontal encephalopathy. Neurology consult requested. Keppra continued. Agitated off sedation but not following commands. MRI brain pending. Will assess mental status during SBT trial.  Pulmonary: Intubated during cardiac arrest, continues to be ventilator dependent. Hold all sedation and start SBT trial.  Cardiovascular: Continues to be Levophed dependent  Abdomen/GI: CT abdomen with contrast did not show any mechanical obstruction, general surgery consult appreciated. Improving transaminitis noted. NAC ordered. Renal: Nephrology consult for IRMA, suspected ATN.

## 2023-08-23 ENCOUNTER — APPOINTMENT (OUTPATIENT)
Dept: MRI IMAGING | Age: 68
DRG: 870 | End: 2023-08-23
Payer: MEDICARE

## 2023-08-23 ENCOUNTER — APPOINTMENT (OUTPATIENT)
Dept: GENERAL RADIOLOGY | Age: 68
DRG: 870 | End: 2023-08-23
Payer: MEDICARE

## 2023-08-23 LAB
AADO2: 324.5 MMHG
ALBUMIN SERPL-MCNC: 3.8 G/DL (ref 3.5–5.2)
ALP SERPL-CCNC: 64 U/L (ref 35–104)
ALT SERPL-CCNC: 180 U/L (ref 0–32)
ANION GAP SERPL CALCULATED.3IONS-SCNC: 15 MMOL/L (ref 7–16)
ANION GAP SERPL CALCULATED.3IONS-SCNC: 17 MMOL/L (ref 7–16)
AST SERPL-CCNC: 77 U/L (ref 0–31)
B.E.: -0.5 MMOL/L (ref -3–3)
BASOPHILS # BLD: 0 K/UL (ref 0–0.2)
BASOPHILS NFR BLD: 0 % (ref 0–2)
BILIRUB SERPL-MCNC: 0.2 MG/DL (ref 0–1.2)
BUN SERPL-MCNC: 29 MG/DL (ref 6–23)
BUN SERPL-MCNC: 32 MG/DL (ref 6–23)
CALCIUM SERPL-MCNC: 8.5 MG/DL (ref 8.6–10.2)
CALCIUM SERPL-MCNC: 8.6 MG/DL (ref 8.6–10.2)
CHLORIDE SERPL-SCNC: 99 MMOL/L (ref 98–107)
CHLORIDE SERPL-SCNC: 99 MMOL/L (ref 98–107)
CO2 SERPL-SCNC: 24 MMOL/L (ref 22–29)
CO2 SERPL-SCNC: 25 MMOL/L (ref 22–29)
COHB: 1.4 % (ref 0–1.5)
CREAT SERPL-MCNC: 1.7 MG/DL (ref 0.5–1)
CREAT SERPL-MCNC: 1.9 MG/DL (ref 0.5–1)
CRITICAL: ABNORMAL
DATE ANALYZED: ABNORMAL
DATE OF COLLECTION: ABNORMAL
EKG ATRIAL RATE: 59 BPM
EKG P AXIS: 67 DEGREES
EKG P-R INTERVAL: 156 MS
EKG Q-T INTERVAL: 432 MS
EKG QRS DURATION: 86 MS
EKG QTC CALCULATION (BAZETT): 427 MS
EKG R AXIS: 87 DEGREES
EKG T AXIS: 59 DEGREES
EKG VENTRICULAR RATE: 59 BPM
EOSINOPHIL # BLD: 0 K/UL (ref 0.05–0.5)
EOSINOPHIL # BLD: 0.06 K/UL (ref 0.05–0.5)
EOSINOPHIL # BLD: 0.34 K/UL (ref 0.05–0.5)
EOSINOPHILS RELATIVE PERCENT: 0 % (ref 0–6)
EOSINOPHILS RELATIVE PERCENT: 1 % (ref 0–6)
EOSINOPHILS RELATIVE PERCENT: 4 % (ref 0–6)
ERYTHROCYTE [DISTWIDTH] IN BLOOD BY AUTOMATED COUNT: 16.2 % (ref 11.5–15)
ERYTHROCYTE [DISTWIDTH] IN BLOOD BY AUTOMATED COUNT: 16.8 % (ref 11.5–15)
ERYTHROCYTE [DISTWIDTH] IN BLOOD BY AUTOMATED COUNT: 17 % (ref 11.5–15)
FIO2: 75 %
GFR SERPL CREATININE-BSD FRML MDRD: 29 ML/MIN/1.73M2
GFR SERPL CREATININE-BSD FRML MDRD: 33 ML/MIN/1.73M2
GLUCOSE BLD-MCNC: 125 MG/DL (ref 74–99)
GLUCOSE SERPL-MCNC: 100 MG/DL (ref 74–99)
GLUCOSE SERPL-MCNC: 118 MG/DL (ref 74–99)
HCO3: 23 MMOL/L (ref 22–26)
HCT VFR BLD AUTO: 17.6 % (ref 34–48)
HCT VFR BLD AUTO: 18.2 % (ref 34–48)
HCT VFR BLD AUTO: 26.2 % (ref 34–48)
HGB BLD-MCNC: 5.6 G/DL (ref 11.5–15.5)
HGB BLD-MCNC: 5.6 G/DL (ref 11.5–15.5)
HGB BLD-MCNC: 8.5 G/DL (ref 11.5–15.5)
HHB: 0.9 % (ref 0–5)
IMM GRANULOCYTES # BLD AUTO: 0.13 K/UL (ref 0–0.58)
IMM GRANULOCYTES NFR BLD: 2 % (ref 0–5)
INR PPP: 2.3
LAB: ABNORMAL
LYMPHOCYTES NFR BLD: 0.06 K/UL (ref 1.5–4)
LYMPHOCYTES NFR BLD: 0.2 K/UL (ref 1.5–4)
LYMPHOCYTES NFR BLD: 0.28 K/UL (ref 1.5–4)
LYMPHOCYTES RELATIVE PERCENT: 1 % (ref 20–42)
LYMPHOCYTES RELATIVE PERCENT: 3 % (ref 20–42)
LYMPHOCYTES RELATIVE PERCENT: 4 % (ref 20–42)
Lab: ABNORMAL
MAGNESIUM SERPL-MCNC: 2.3 MG/DL (ref 1.6–2.6)
MCH RBC QN AUTO: 24.5 PG (ref 26–35)
MCH RBC QN AUTO: 25.1 PG (ref 26–35)
MCH RBC QN AUTO: 26.2 PG (ref 26–35)
MCHC RBC AUTO-ENTMCNC: 30.8 G/DL (ref 32–34.5)
MCHC RBC AUTO-ENTMCNC: 31.8 G/DL (ref 32–34.5)
MCHC RBC AUTO-ENTMCNC: 32.4 G/DL (ref 32–34.5)
MCV RBC AUTO: 78.9 FL (ref 80–99.9)
MCV RBC AUTO: 79.5 FL (ref 80–99.9)
MCV RBC AUTO: 80.6 FL (ref 80–99.9)
METHB: 1 % (ref 0–1.5)
MODE: AC
MONOCYTES NFR BLD: 0.07 K/UL (ref 0.1–0.95)
MONOCYTES NFR BLD: 0.32 K/UL (ref 0.1–0.95)
MONOCYTES NFR BLD: 0.48 K/UL (ref 0.1–0.95)
MONOCYTES NFR BLD: 1 % (ref 2–12)
MONOCYTES NFR BLD: 4 % (ref 2–12)
MONOCYTES NFR BLD: 6 % (ref 2–12)
NEUTROPHILS NFR BLD: 89 % (ref 43–80)
NEUTROPHILS NFR BLD: 92 % (ref 43–80)
NEUTROPHILS NFR BLD: 94 % (ref 43–80)
NEUTS SEG NFR BLD: 6.92 K/UL (ref 1.8–7.3)
NEUTS SEG NFR BLD: 6.95 K/UL (ref 1.8–7.3)
NEUTS SEG NFR BLD: 7.19 K/UL (ref 1.8–7.3)
NUCLEATED RED BLOOD CELLS: 2 PER 100 WBC
NUCLEATED RED BLOOD CELLS: 3 PER 100 WBC
O2 CONTENT: 9 ML/DL
O2 SATURATION: 99.1 % (ref 92–98.5)
O2HB: 96.7 % (ref 94–97)
OPERATOR ID: 1893
PARTIAL THROMBOPLASTIN TIME: 28.5 SEC (ref 24.5–35.1)
PATIENT TEMP: 37 C
PCO2: 32 MMHG (ref 35–45)
PEEP/CPAP: 10 CMH2O
PFO2: 2.1 MMHG/%
PH BLOOD GAS: 7.47 (ref 7.35–7.45)
PHOSPHATE SERPL-MCNC: 1.5 MG/DL (ref 2.5–4.5)
PLATELET # BLD AUTO: 144 K/UL (ref 130–450)
PLATELET, FLUORESCENCE: 114 K/UL (ref 130–450)
PLATELET, FLUORESCENCE: 126 K/UL (ref 130–450)
PMV BLD AUTO: 11.6 FL (ref 7–12)
PMV BLD AUTO: 11.8 FL (ref 7–12)
PMV BLD AUTO: 13.1 FL (ref 7–12)
PO2: 157.5 MMHG (ref 75–100)
POTASSIUM SERPL-SCNC: 2.9 MMOL/L (ref 3.5–5)
POTASSIUM SERPL-SCNC: 3.1 MMOL/L (ref 3.5–5)
PROT SERPL-MCNC: 5.6 G/DL (ref 6.4–8.3)
PROTHROMBIN TIME: 25.2 SEC (ref 9.3–12.4)
RBC # BLD AUTO: 2.23 M/UL (ref 3.5–5.5)
RBC # BLD AUTO: 2.29 M/UL (ref 3.5–5.5)
RBC # BLD AUTO: 3.25 M/UL (ref 3.5–5.5)
RBC # BLD: ABNORMAL 10*6/UL
RI(T): 2.06
RR MECHANICAL: 26 B/MIN
SODIUM SERPL-SCNC: 139 MMOL/L (ref 132–146)
SODIUM SERPL-SCNC: 140 MMOL/L (ref 132–146)
SOURCE, BLOOD GAS: ABNORMAL
THB: 6.3 G/DL (ref 11.5–16.5)
TIME ANALYZED: 436
VT MECHANICAL: 400 ML
WBC OTHER # BLD: 7.4 K/UL (ref 4.5–11.5)
WBC OTHER # BLD: 7.8 K/UL (ref 4.5–11.5)
WBC OTHER # BLD: 7.8 K/UL (ref 4.5–11.5)

## 2023-08-23 PROCEDURE — 86900 BLOOD TYPING SEROLOGIC ABO: CPT

## 2023-08-23 PROCEDURE — 83735 ASSAY OF MAGNESIUM: CPT

## 2023-08-23 PROCEDURE — 94003 VENT MGMT INPAT SUBQ DAY: CPT

## 2023-08-23 PROCEDURE — 2580000003 HC RX 258: Performed by: INTERNAL MEDICINE

## 2023-08-23 PROCEDURE — 99232 SBSQ HOSP IP/OBS MODERATE 35: CPT | Performed by: FAMILY MEDICINE

## 2023-08-23 PROCEDURE — 82805 BLOOD GASES W/O2 SATURATION: CPT

## 2023-08-23 PROCEDURE — 6360000002 HC RX W HCPCS: Performed by: INTERNAL MEDICINE

## 2023-08-23 PROCEDURE — 85610 PROTHROMBIN TIME: CPT

## 2023-08-23 PROCEDURE — 70551 MRI BRAIN STEM W/O DYE: CPT

## 2023-08-23 PROCEDURE — 84100 ASSAY OF PHOSPHORUS: CPT

## 2023-08-23 PROCEDURE — 80048 BASIC METABOLIC PNL TOTAL CA: CPT

## 2023-08-23 PROCEDURE — 93010 ELECTROCARDIOGRAM REPORT: CPT | Performed by: INTERNAL MEDICINE

## 2023-08-23 PROCEDURE — 6360000002 HC RX W HCPCS: Performed by: NURSE PRACTITIONER

## 2023-08-23 PROCEDURE — 2000000000 HC ICU R&B

## 2023-08-23 PROCEDURE — 99291 CRITICAL CARE FIRST HOUR: CPT | Performed by: INTERNAL MEDICINE

## 2023-08-23 PROCEDURE — 2580000003 HC RX 258

## 2023-08-23 PROCEDURE — 99231 SBSQ HOSP IP/OBS SF/LOW 25: CPT | Performed by: NURSE PRACTITIONER

## 2023-08-23 PROCEDURE — 2500000003 HC RX 250 WO HCPCS: Performed by: NURSE PRACTITIONER

## 2023-08-23 PROCEDURE — 6370000000 HC RX 637 (ALT 250 FOR IP)

## 2023-08-23 PROCEDURE — P9047 ALBUMIN (HUMAN), 25%, 50ML: HCPCS | Performed by: INTERNAL MEDICINE

## 2023-08-23 PROCEDURE — A4216 STERILE WATER/SALINE, 10 ML: HCPCS

## 2023-08-23 PROCEDURE — 71045 X-RAY EXAM CHEST 1 VIEW: CPT

## 2023-08-23 PROCEDURE — 6370000000 HC RX 637 (ALT 250 FOR IP): Performed by: INTERNAL MEDICINE

## 2023-08-23 PROCEDURE — 2580000003 HC RX 258: Performed by: NURSE PRACTITIONER

## 2023-08-23 PROCEDURE — 85025 COMPLETE CBC W/AUTO DIFF WBC: CPT

## 2023-08-23 PROCEDURE — 6360000002 HC RX W HCPCS

## 2023-08-23 PROCEDURE — 94640 AIRWAY INHALATION TREATMENT: CPT

## 2023-08-23 PROCEDURE — 80053 COMPREHEN METABOLIC PANEL: CPT

## 2023-08-23 PROCEDURE — 82962 GLUCOSE BLOOD TEST: CPT

## 2023-08-23 PROCEDURE — 86923 COMPATIBILITY TEST ELECTRIC: CPT

## 2023-08-23 PROCEDURE — 36430 TRANSFUSION BLD/BLD COMPNT: CPT

## 2023-08-23 PROCEDURE — 86850 RBC ANTIBODY SCREEN: CPT

## 2023-08-23 PROCEDURE — P9016 RBC LEUKOCYTES REDUCED: HCPCS

## 2023-08-23 PROCEDURE — C9113 INJ PANTOPRAZOLE SODIUM, VIA: HCPCS

## 2023-08-23 PROCEDURE — 86901 BLOOD TYPING SEROLOGIC RH(D): CPT

## 2023-08-23 PROCEDURE — 85730 THROMBOPLASTIN TIME PARTIAL: CPT

## 2023-08-23 RX ORDER — HEPARIN 100 UNIT/ML
3 SYRINGE INTRAVENOUS PRN
Status: DISCONTINUED | OUTPATIENT
Start: 2023-08-23 | End: 2023-09-13 | Stop reason: HOSPADM

## 2023-08-23 RX ORDER — SODIUM CHLORIDE 0.9 % (FLUSH) 0.9 %
5-40 SYRINGE (ML) INJECTION EVERY 12 HOURS SCHEDULED
Status: DISCONTINUED | OUTPATIENT
Start: 2023-08-24 | End: 2023-09-13 | Stop reason: HOSPADM

## 2023-08-23 RX ORDER — POTASSIUM CHLORIDE 29.8 MG/ML
40 INJECTION INTRAVENOUS ONCE
Status: COMPLETED | OUTPATIENT
Start: 2023-08-23 | End: 2023-08-23

## 2023-08-23 RX ORDER — MIDAZOLAM HYDROCHLORIDE 2 MG/2ML
2 INJECTION, SOLUTION INTRAMUSCULAR; INTRAVENOUS ONCE
Status: DISCONTINUED | OUTPATIENT
Start: 2023-08-23 | End: 2023-08-23

## 2023-08-23 RX ORDER — HEPARIN 100 UNIT/ML
3 SYRINGE INTRAVENOUS EVERY 12 HOURS SCHEDULED
Status: DISCONTINUED | OUTPATIENT
Start: 2023-08-24 | End: 2023-09-13 | Stop reason: HOSPADM

## 2023-08-23 RX ORDER — MIDAZOLAM HYDROCHLORIDE 1 MG/ML
INJECTION INTRAMUSCULAR; INTRAVENOUS
Status: DISPENSED
Start: 2023-08-23 | End: 2023-08-24

## 2023-08-23 RX ORDER — MIDAZOLAM HYDROCHLORIDE 2 MG/2ML
2 INJECTION, SOLUTION INTRAMUSCULAR; INTRAVENOUS ONCE
Status: COMPLETED | OUTPATIENT
Start: 2023-08-23 | End: 2023-08-23

## 2023-08-23 RX ORDER — SODIUM CHLORIDE 0.9 % (FLUSH) 0.9 %
5-40 SYRINGE (ML) INJECTION PRN
Status: DISCONTINUED | OUTPATIENT
Start: 2023-08-23 | End: 2023-09-13 | Stop reason: HOSPADM

## 2023-08-23 RX ORDER — LIDOCAINE HYDROCHLORIDE 10 MG/ML
5 INJECTION, SOLUTION EPIDURAL; INFILTRATION; INTRACAUDAL; PERINEURAL ONCE
Status: DISCONTINUED | OUTPATIENT
Start: 2023-08-24 | End: 2023-09-13 | Stop reason: HOSPADM

## 2023-08-23 RX ORDER — SODIUM CHLORIDE 9 MG/ML
INJECTION, SOLUTION INTRAVENOUS PRN
Status: DISCONTINUED | OUTPATIENT
Start: 2023-08-23 | End: 2023-09-13 | Stop reason: HOSPADM

## 2023-08-23 RX ADMIN — POTASSIUM CHLORIDE 40 MEQ: 29.8 INJECTION, SOLUTION INTRAVENOUS at 20:28

## 2023-08-23 RX ADMIN — POTASSIUM PHOSPHATE, MONOBASIC AND POTASSIUM PHOSPHATE, DIBASIC 30 MMOL: 224; 236 INJECTION, SOLUTION, CONCENTRATE INTRAVENOUS at 06:57

## 2023-08-23 RX ADMIN — SODIUM CHLORIDE: 9 INJECTION, SOLUTION INTRAVENOUS at 03:16

## 2023-08-23 RX ADMIN — CHLORHEXIDINE GLUCONATE 15 ML: 1.2 RINSE ORAL at 09:29

## 2023-08-23 RX ADMIN — PHYTONADIONE 10 MG: 10 INJECTION, EMULSION INTRAMUSCULAR; INTRAVENOUS; SUBCUTANEOUS at 12:07

## 2023-08-23 RX ADMIN — LEVOTHYROXINE SODIUM 75 MCG: 0.07 TABLET ORAL at 06:26

## 2023-08-23 RX ADMIN — HYDROCORTISONE SODIUM SUCCINATE 50 MG: 100 INJECTION, POWDER, FOR SOLUTION INTRAMUSCULAR; INTRAVENOUS at 16:05

## 2023-08-23 RX ADMIN — Medication 10 ML: at 20:26

## 2023-08-23 RX ADMIN — HYDROCORTISONE SODIUM SUCCINATE 50 MG: 100 INJECTION, POWDER, FOR SOLUTION INTRAMUSCULAR; INTRAVENOUS at 04:42

## 2023-08-23 RX ADMIN — CEFTOLOZANE AND TAZOBACTAM 750 MG: 1; .5 INJECTION, POWDER, LYOPHILIZED, FOR SOLUTION INTRAVENOUS at 12:10

## 2023-08-23 RX ADMIN — AMIODARONE HYDROCHLORIDE 200 MG: 200 TABLET ORAL at 09:27

## 2023-08-23 RX ADMIN — STANDARDIZED SENNA CONCENTRATE 8.6 MG: 8.6 TABLET ORAL at 20:26

## 2023-08-23 RX ADMIN — CHLORHEXIDINE GLUCONATE 15 ML: 1.2 RINSE ORAL at 20:26

## 2023-08-23 RX ADMIN — HYDROCORTISONE SODIUM SUCCINATE 50 MG: 100 INJECTION, POWDER, FOR SOLUTION INTRAMUSCULAR; INTRAVENOUS at 09:28

## 2023-08-23 RX ADMIN — BUDESONIDE INHALATION 500 MCG: 0.5 SUSPENSION RESPIRATORY (INHALATION) at 08:26

## 2023-08-23 RX ADMIN — LEVETIRACETAM 500 MG: 500 TABLET, FILM COATED ORAL at 20:26

## 2023-08-23 RX ADMIN — ALBUMIN (HUMAN) 25 G: 0.25 INJECTION, SOLUTION INTRAVENOUS at 02:21

## 2023-08-23 RX ADMIN — SODIUM CHLORIDE: 9 INJECTION, SOLUTION INTRAVENOUS at 21:25

## 2023-08-23 RX ADMIN — CEFTOLOZANE AND TAZOBACTAM 750 MG: 1; .5 INJECTION, POWDER, LYOPHILIZED, FOR SOLUTION INTRAVENOUS at 04:42

## 2023-08-23 RX ADMIN — LEVETIRACETAM 500 MG: 500 TABLET, FILM COATED ORAL at 09:28

## 2023-08-23 RX ADMIN — SODIUM CHLORIDE, PRESERVATIVE FREE 40 MG: 5 INJECTION INTRAVENOUS at 09:28

## 2023-08-23 RX ADMIN — CEFTOLOZANE AND TAZOBACTAM 750 MG: 1; .5 INJECTION, POWDER, LYOPHILIZED, FOR SOLUTION INTRAVENOUS at 19:40

## 2023-08-23 RX ADMIN — BUDESONIDE INHALATION 500 MCG: 0.5 SUSPENSION RESPIRATORY (INHALATION) at 20:08

## 2023-08-23 RX ADMIN — ARFORMOTEROL TARTRATE 15 MCG: 15 SOLUTION RESPIRATORY (INHALATION) at 08:26

## 2023-08-23 RX ADMIN — MIDAZOLAM 2 MG: 1 INJECTION INTRAMUSCULAR; INTRAVENOUS at 18:30

## 2023-08-23 RX ADMIN — Medication 10 ML: at 09:29

## 2023-08-23 RX ADMIN — ARFORMOTEROL TARTRATE 15 MCG: 15 SOLUTION RESPIRATORY (INHALATION) at 20:08

## 2023-08-23 ASSESSMENT — PAIN SCALES - GENERAL
PAINLEVEL_OUTOF10: 0

## 2023-08-23 ASSESSMENT — PULMONARY FUNCTION TESTS
PIF_VALUE: 29
PIF_VALUE: 36
PIF_VALUE: 31
PIF_VALUE: 34
PIF_VALUE: 30
PIF_VALUE: 35
PIF_VALUE: 33
PIF_VALUE: 32
PIF_VALUE: 33
PIF_VALUE: 35
PIF_VALUE: 37
PIF_VALUE: 33
PIF_VALUE: 30
PIF_VALUE: 34
PIF_VALUE: 33
PIF_VALUE: 33
PIF_VALUE: 32
PIF_VALUE: 38
PIF_VALUE: 31
PIF_VALUE: 32
PIF_VALUE: 34
PIF_VALUE: 30
PIF_VALUE: 30
PIF_VALUE: 31
PIF_VALUE: 30
PIF_VALUE: 32

## 2023-08-23 NOTE — PROGRESS NOTES
[facility] Inpatient   Resident Progress Note    S:  Hospital day: 4    Brief Synopsis: Prabha Feldman is a 78 y.o. female with a past medical history of pulmonary sarcoidosis, atrial fibrillation on Eliquis, pulmonary hypertension, diabetes insipidus, hypothyroidism, hypertension, chronic kidney disease stage IIIa and combined systolic/diastolic heart failure who presents to the emergency department for shortness of breath. Patient was recently discharged from [facility] (08/18/23) due to acute hypoxic respiratory failure. Patient has had numerous hospital admissions for the same complaint. Hypotensive, tachycardic and hypoxic at presentation. She did decompensate and ended up in PEA. 2 rounds of CPR were performed and ROSC achieved. Patient admitted to the intensive care unit for acute hypoxic respiratory failure requiring intubation and vasopressors. Overnight/Interim  Patient was seen and examined this morning. Intubated, no longer sedated and not on any pressors  Tracking with her eyes, blinks once for yes and twice for no questions, otherwise not following commands.   Had 2 non-bloody BMs overnight as per ICU nurse   Patient continues to reach for lines and tubing when unrestrained   TFs in place     Cont meds:    sodium chloride      sodium chloride 60 mL/hr at 08/23/23 0630    dexmedetomidine (PRECEDEX) IV infusion Stopped (08/21/23 2035)    [Held by provider] fentaNYL Stopped (08/22/23 0929)    [Held by provider] midazolam Stopped (08/22/23 0746)    dextrose      norepinephrine Stopped (08/22/23 1459)    sodium chloride 10 mL/hr at 08/22/23 0651     Scheduled meds:    ceftolozane-tazobactam (ZERBAXA) IVPB  750 mg IntraVENous Q8H    chlorhexidine  15 mL Mouth/Throat BID    hydrocortisone sodium succinate PF  50 mg IntraVENous Q6H    amiodarone  200 mg Oral Daily    budesonide  500 mcg Nebulization BID RT    arformoterol tartrate  15 mcg Nebulization BID RT    [Held by provider] hypochloremia  History of diabetes insipidus  Lasix held due to worsening kidney function  Urine studies and osmolality, NS at 60 cc/hr  Nephro on board - will resume home desmopressin if sodium levels increase. Hydrocortisone 100 mg IV Q8   NICOM showed fluid responsiveness, albumin started   Strict I/Os  Cr 2.6, up trending from 2.3  Management per ICU    Anoxic Encephalopathy   Neuro on board   EEG negative for seizure activity. Remarkable for nonspecific bifrontal encephalopathy  Continue Keppra   Pending brain MRI    Umbilical Hernia  Constipation  Continue polyethylene glycol and senna  Holding ferrous sulfate  Gen surg was consulted for umbilical hernia with suspecting cecal volvulus  CT abdomen/pelvis was unremarkable for any mechanical obstruction   Will monitor bowel movements per gen surg, with no acute surgical intervention at this time. Chronic microcytic anemia  History of iron deficiency  Leukopenia  Hgb 5.6 on morning labs, transfused 2 units RBC  Monitor H/H and transfused if hemoglobin < 7  Consider restarting ferrous sulfate, currently holding it due to constipation  Management per ICU     Hypoglycemia  Hypoglycemia protocol     Paroxysmal atrial fibrillation  Currently holding Toprol XL due low blood pressure  Continue Eliquis 5 mg twice daily  Continue amiodarone 200 mg daily        PT/OT evaluation: To be consulted when patient leaves ICU  DVT prophylaxis: Eliquis 5 mg twice daily  GI prophylaxis: Protonix 40 mg IV daily  Diet: N.p.o.  Full code  Disposition: ICU   consulted for placement at discharge. Recommend facility due to multiple hospital admissions.       Electronically signed by Sharon Linares MD on 8/23/2023 at 1:18 PM  This case was discussed with attending physician Dr. Yudy Whyte

## 2023-08-23 NOTE — PROGRESS NOTES
Palliative Care Department  [phone]  Palliative Care Progress Note  Provider Vitaly Joshitering, APRN - CNP     Mary Wu  [id number]  Hospital Day: 5  Date of Initial Consult: 8/22/2023  Referring Provider: Stevo Jara MD   Palliative Medicine was consulted for assistance with: Discussion    HPI:   Mary Wu is a 78 y.o. with a medical history of  pulmonary sarcoidosis, atrial fibrillation on Eliquis, pulmonary hypertension, diabetes insipidus, hypothyroidism, hypertension, chronic kidney disease stage IIIa and combined systolic/diastolic heart failure who was admitted on 8/19/2023 from home with a CHIEF COMPLAINT of shortness of breath. Patient was recently discharged from [facility] (08/18/23) due to acute hypoxic respiratory failure. Patient has had numerous hospital admissions for the same complaint. Based on discharge summary she was offered discharge to [facility]/SNF, [place], but declined and was eventually discharged home with Bactrim. In the ED, patient was hypotensive, tachycardic, and hypoxic. Patient decompensated and went into PEA, ROSC achieved. Patient was intubated and transferred to ICU for further medical management. CT head negative for any acute intracranial abnormality. CTA pulmonary remarkable for left perihilar and left lower lobe pneumonia. , emphysematous changes with superimposed interstitial pulmonary fibrosis, cardiomegaly with ectasia of the pulmonary trunk and pulmonary arteries which can be seen with pulmonary hypertension. Contrast is seen within the right atrium and extends into the inferior vena cava. These findings can be seen with right heart failure. There is no pulmonary embolus. CT abdomen positive for constipation and prominent umbilical hernia. EEG: showed moderate encephalopathy, moderate non specific cerebral dysfunction of bilateral frontal lobes, no seizures. Palliative medicine consulted for further assistance.        ASSESSMENT/PLAN:

## 2023-08-23 NOTE — PROGRESS NOTES
Patient continues to reach for ETT and other necessary medical equipment while unrestrained and does not follow verbal redirection at this time. Soft bilateral wrist restraints continued for patient safety.

## 2023-08-23 NOTE — PROGRESS NOTES
[address] [facility]  Neurology Follow up    Date:  8/23/2023  Patient Name:  Cathyann Lanes  YOB: 1955  MRN: [id number]     Chief Complaint: Acute encephalopathy s/p cardiac arrest    History obtained from: EMR     PM of seizures on Keppra, sarcoidosis, A-fib on Eliquis, hypertension, PE/DVT, hypothyroidism, anemia of CKD    Assessment  Acute encephalopathy s/p cardiac arrest   --- the patient recently had PEA arrest s/p 2 rounds of CPR with ROSC achieved  --- Currently not sedated on fentanyl, patient opens eyes to loud voice, unable to follow commands   --- EEG showed no seizures, did show moderate encephalopathy       Plan  Wean sedation as deemed appropriate  MRI brain to assess possible injury  Continue to monitor mentation  Sedation may be contributing to the patient's current mental status, as ICU team is weaning sedation neurology will continue to assess      History of Present Illness:  Presenting for evaluation of recent cardiac arrest. The patient was recently discharged on 08/18/23 for acute hypoxic respiratory failure at that time the patient was discharged on Bactrim. Recently came to the ED and found to be hypotensive, tachycardic and hypoxic. The patient subsequently decompensated and ended up having PE arrest s/p 2 rounds of CPR with ROSC achievement. The patient was intubated sedated started on Levophed and transferred to the ICU. Pt lying in bed. RN at bedside. MRI brain not completed yet. Pt opens eyes to loud voice, grimaces to pain in extremities, does not follow commands. No changes from yesterday's assessment.     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

## 2023-08-23 NOTE — PLAN OF CARE
Problem: Safety - Medical Restraint  Goal: Remains free of injury from restraints (Restraint for Interference with Medical Device)  Description: INTERVENTIONS:  1. Determine that other, less restrictive measures have been tried or would not be effective before applying the restraint  2. Evaluate the patient's condition at the time of restraint application  3. Inform patient/family regarding the reason for restraint  4.  Q2H: Monitor safety, psychosocial status, comfort, nutrition and hydration  Outcome: Progressing  Flowsheets (Taken 8/23/2023 0001)  Remains free of injury from restraints (restraint for interference with medical device):   Every 2 hours: Monitor safety, psychosocial status, comfort, nutrition and hydration   Determine that other, less restrictive measures have been tried or would not be effective before applying the restraint   Evaluate the patient's condition at the time of restraint application   Inform patient/family regarding the reason for restraint    Problem: Cardiovascular - Adult  Goal: Maintains optimal cardiac output and hemodynamic stability  Outcome: Progressing  Flowsheets (Taken 8/23/2023 0675)  Maintains optimal cardiac output and hemodynamic stability:   Monitor blood pressure and heart rate   Monitor urine output and notify Licensed Independent Practitioner for values outside of normal range   Assess for signs of decreased cardiac output     Problem: Respiratory - Adult  Goal: Achieves optimal ventilation and oxygenation  Outcome: Progressing  Flowsheets (Taken 8/23/2023 6285)  Achieves optimal ventilation and oxygenation:   Assess for changes in respiratory status   Assess for changes in mentation and behavior     Problem: Neurosensory - Adult  Goal: Achieves stable or improved neurological status  Outcome: Progressing  Flowsheets (Taken 8/23/2023 8950)  Achieves stable or improved neurological status: Assess for and report changes in neurological status

## 2023-08-23 NOTE — PROGRESS NOTES
[address] [facility]             Pulmonary & Critical Care Medicine                MICU Progress Note                 Written by: Sang Mendoza MD  Name: Goldy Beckwith : 1955       Age: 78 y.o. MR/Act #    : [id number],  Billing  #    : [id number]   Admit Date: 2023  2:21 AM LOS: 4,   Hospital Day: 5 Room #      : 0642/9036-Q   PCP            : Reg Watts MD,   Referred by: Clementine Clay MD ICU Attending: MD Fredrick Rios date: 2023 10:15 AM   ICU Days:       4 Vent Days:    4 LOS: 4,                          Reason for ICU admission           Chief Complaint   Patient presents with    Shortness of Breath     (Started earlier last night, does wear home O2 on 6L)                          Brief HPI, Presentation & Synopsis                       21-year-old female with past medical history of acute on chronic respiratory failure, Pseudomonas pneumonia, stenotrophomonas pneumonia, pulmonary hypertension, pulmonary sarcoidosis IRMA on top of CKD, hyponatremia, anemia of chronic disease presented to the ER where she had PEA with cardiac arrest.  2 rounds of CPR and ROSC was achieved. She was intubated during the cardiac arrest.  Hypotension was noted and started on Levophed drip. CTA chest showed left-sided pneumonia. Admitted in ICU for septic shock, cardiac arrest and respiratory failure due to pneumonia.     Active problem list of yesterday:  Active Hospital Problems    Diagnosis Date Noted    Moderate protein-calorie malnutrition ([address]) [E44.0] 2023    Palliative care encounter [Z51.5] 2023    Cardiac arrest ([address]) [I46.9] 2023    Shortness of breath [R06.02] 2023    Altered mental status [R41.82] 2014                           Events of last night                      Continues to be ventilator dependent                      Subjective   2023               Continues to be Levophed dependent QUADRIVALENT, PRESERVATIVE FREE 10/03/2019    Influenza 10/26/2011    Influenza A ([id number]) Vaccine PF IM 11/04/2009    Influenza Vaccine, unspecified formulation 10/26/2011, 12/11/2013, 09/26/2016, 09/24/2018    Influenza Virus Vaccine 10/01/2010, 09/01/2012, 12/11/2013, 11/27/2015, 10/12/2020    Influenza, AFLURIA (age 1 yrs+), FLUZONE, (age 10 mo+), MDV, 0.5mL 09/26/2016    Influenza, FLUARIX, FLULAVAL, FLUZONE (age 10 mo+) AND AFLURIA, (age 1 y+), PF, 0.5mL 09/24/2018, 10/28/2019, 10/12/2020, 11/04/2021    Influenza, FLUCELVAX, (age 10 mo+), MDCK, MDV, 0.5mL 09/25/2017    Influenza, Intradermal, Preservative free 11/05/2014    MMR, PRIORIX, M-M-R II, (age 12m+), SC, 0.5mL 05/13/2019    Pneumococcal Conjugate Vaccine 10/06/2015    Pneumococcal, PPSV23, PNEUMOVAX 23, (age 2y+), SC/IM, 0.5mL 06/02/2010, 01/03/2017, 05/19/2022    TDaP, ADACEL (age 6y-58y), BOOSTRIX (age 10y+), IM, 0.5mL 06/02/2010    Zoster Recombinant (Shingrix) 10/12/2020, 10/12/2020, 08/03/2021             Labs and Imaging Studies                  CBC  :  Recent Labs     08/21/23  0507 08/22/23  0419 08/23/23  0412   WBC 9.9 10.5 7.4   RBC 3.11* 2.95* 2.23*   HGB 7.7* 7.3* 5.6*   HCT 25.0* 24.1* 17.6*   MCV 80.4 81.7 78.9*   MCH 24.8* 24.7* 25.1*   MCHC 30.8* 30.3* 31.8*   RDW 16.3* 16.4* 16.8*    189  --    MPV 11.2 10.9 11.8     Comprehensive :   Recent Labs     08/21/23  0507 08/21/23  1411 08/22/23  0419 08/22/23  1642 08/23/23  0412      < > 135 140 139   K 4.7   < > 4.5 3.8 2.9*   CL 99   < > 98 98 99   CO2 30*   < > 24 29 25   BUN 39*   < > 41* 41* 32*   CREATININE 2.3*   < > 2.6* 2.2* 1.9*   GLUCOSE 125*   < > 125* 112* 100*   CALCIUM 8.5*   < > 8.0* 8.2* 8.5*   PROT 6.3*  --  6.0*  --  5.6*   LABALBU 3.4*  --  3.1*  --  3.8   BILITOT 0.2  --  0.2  --  0.2   ALKPHOS 101  --  91  --  64   *  --  176*  --  77*   *  --  351*  --  180*    < > = values in this interval not displayed.      Cardiac :   Lab Results critical care time caring for this patient with life threatening, unstable organ failure, including direct patient contact, management of life support systems, review of data including imaging and labs, discussions with other team members and physicians is at least 28 Min so far today, excluding procedures.       Electronically signed by Bruce Li MD on 8/23/2023 at 10:15 AM  [address]

## 2023-08-23 NOTE — FLOWSHEET NOTE
Patient is tolerating blood administration well with no signs of transfusion reaction.  Vital signs have been stable throughout start of transfusion

## 2023-08-23 NOTE — CARE COORDINATION
Care Coordination:  LOS 4 day. Remains intubated, Fentanyl, Zerbaxa, H&H 5.6/17.6 this am. Vit K and 2 units RBC ordered. Per chart review, cardiac arrest, suspected anoxic encephalopathy, acute on chronic resp failure. Select following.     Electronically signed by Astrid Young RN on 8/23/2023 at 12:54 PM

## 2023-08-24 ENCOUNTER — APPOINTMENT (OUTPATIENT)
Dept: GENERAL RADIOLOGY | Age: 68
DRG: 870 | End: 2023-08-24
Payer: MEDICARE

## 2023-08-24 LAB
AADO2: 223.7 MMHG
ABO/RH: NORMAL
ALBUMIN SERPL-MCNC: 3.8 G/DL (ref 3.5–5.2)
ALP SERPL-CCNC: 83 U/L (ref 35–104)
ALT SERPL-CCNC: 164 U/L (ref 0–32)
ANION GAP SERPL CALCULATED.3IONS-SCNC: 11 MMOL/L (ref 7–16)
ANTIBODY SCREEN: NEGATIVE
ARM BAND NUMBER: NORMAL
AST SERPL-CCNC: 81 U/L (ref 0–31)
B.E.: 4.5 MMOL/L (ref -3–3)
BASOPHILS # BLD: 0 K/UL (ref 0–0.2)
BASOPHILS # BLD: 0 K/UL (ref 0–0.2)
BASOPHILS # BLD: 0.01 K/UL (ref 0–0.2)
BASOPHILS # BLD: 0.01 K/UL (ref 0–0.2)
BASOPHILS NFR BLD: 0 % (ref 0–2)
BILIRUB SERPL-MCNC: 0.4 MG/DL (ref 0–1.2)
BLOOD BANK BLOOD PRODUCT EXPIRATION DATE: NORMAL
BLOOD BANK BLOOD PRODUCT EXPIRATION DATE: NORMAL
BLOOD BANK DISPENSE STATUS: NORMAL
BLOOD BANK DISPENSE STATUS: NORMAL
BLOOD BANK ISBT PRODUCT BLOOD TYPE: 5100
BLOOD BANK ISBT PRODUCT BLOOD TYPE: 5100
BLOOD BANK PRODUCT CODE: NORMAL
BLOOD BANK PRODUCT CODE: NORMAL
BLOOD BANK SAMPLE EXPIRATION: NORMAL
BLOOD BANK UNIT TYPE AND RH: NORMAL
BLOOD BANK UNIT TYPE AND RH: NORMAL
BPU ID: NORMAL
BPU ID: NORMAL
BUN SERPL-MCNC: 22 MG/DL (ref 6–23)
CALCIUM SERPL-MCNC: 9.1 MG/DL (ref 8.6–10.2)
CHLORIDE SERPL-SCNC: 106 MMOL/L (ref 98–107)
CO2 SERPL-SCNC: 27 MMOL/L (ref 22–29)
COHB: 0.8 % (ref 0–1.5)
COMPONENT: NORMAL
COMPONENT: NORMAL
CREAT SERPL-MCNC: 1.1 MG/DL (ref 0.5–1)
CREAT UR-MCNC: 16.3 MG/DL (ref 29–226)
CRITICAL: ABNORMAL
CROSSMATCH RESULT: NORMAL
CROSSMATCH RESULT: NORMAL
DATE ANALYZED: ABNORMAL
DATE OF COLLECTION: ABNORMAL
EOSINOPHIL # BLD: 0 K/UL (ref 0.05–0.5)
EOSINOPHIL # BLD: 0.02 K/UL (ref 0.05–0.5)
EOSINOPHILS RELATIVE PERCENT: 0 % (ref 0–6)
ERYTHROCYTE [DISTWIDTH] IN BLOOD BY AUTOMATED COUNT: 16.1 % (ref 11.5–15)
ERYTHROCYTE [DISTWIDTH] IN BLOOD BY AUTOMATED COUNT: 16.5 % (ref 11.5–15)
ERYTHROCYTE [DISTWIDTH] IN BLOOD BY AUTOMATED COUNT: 16.5 % (ref 11.5–15)
ERYTHROCYTE [DISTWIDTH] IN BLOOD BY AUTOMATED COUNT: 17.5 % (ref 11.5–15)
FIO2: 50 %
GFR SERPL CREATININE-BSD FRML MDRD: 57 ML/MIN/1.73M2
GLUCOSE SERPL-MCNC: 75 MG/DL (ref 74–99)
HCO3: 27.1 MMOL/L (ref 22–26)
HCT VFR BLD AUTO: 26.5 % (ref 34–48)
HCT VFR BLD AUTO: 26.8 % (ref 34–48)
HCT VFR BLD AUTO: 27.5 % (ref 34–48)
HCT VFR BLD AUTO: 28.8 % (ref 34–48)
HGB BLD-MCNC: 8.6 G/DL (ref 11.5–15.5)
HGB BLD-MCNC: 8.8 G/DL (ref 11.5–15.5)
HGB BLD-MCNC: 8.8 G/DL (ref 11.5–15.5)
HGB BLD-MCNC: 9.2 G/DL (ref 11.5–15.5)
HHB: 3.8 % (ref 0–5)
IMM GRANULOCYTES # BLD AUTO: 0.14 K/UL (ref 0–0.58)
IMM GRANULOCYTES NFR BLD: 1 % (ref 0–5)
INR PPP: 1.4
LAB: ABNORMAL
LYMPHOCYTES NFR BLD: 0.17 K/UL (ref 1.5–4)
LYMPHOCYTES NFR BLD: 0.31 K/UL (ref 1.5–4)
LYMPHOCYTES NFR BLD: 0.45 K/UL (ref 1.5–4)
LYMPHOCYTES NFR BLD: 0.51 K/UL (ref 1.5–4)
LYMPHOCYTES RELATIVE PERCENT: 2 % (ref 20–42)
LYMPHOCYTES RELATIVE PERCENT: 3 % (ref 20–42)
LYMPHOCYTES RELATIVE PERCENT: 4 % (ref 20–42)
LYMPHOCYTES RELATIVE PERCENT: 5 % (ref 20–42)
Lab: ABNORMAL
MAGNESIUM SERPL-MCNC: 2.2 MG/DL (ref 1.6–2.6)
MCH RBC QN AUTO: 26 PG (ref 26–35)
MCH RBC QN AUTO: 26.4 PG (ref 26–35)
MCHC RBC AUTO-ENTMCNC: 31.9 G/DL (ref 32–34.5)
MCHC RBC AUTO-ENTMCNC: 32 G/DL (ref 32–34.5)
MCHC RBC AUTO-ENTMCNC: 32.5 G/DL (ref 32–34.5)
MCHC RBC AUTO-ENTMCNC: 32.8 G/DL (ref 32–34.5)
MCV RBC AUTO: 80.5 FL (ref 80–99.9)
MCV RBC AUTO: 81.3 FL (ref 80–99.9)
MCV RBC AUTO: 81.4 FL (ref 80–99.9)
MCV RBC AUTO: 82.6 FL (ref 80–99.9)
METHB: 0.4 % (ref 0–1.5)
MICROORGANISM SPEC CULT: NORMAL
MICROORGANISM SPEC CULT: NORMAL
MODE: AC
MONOCYTES NFR BLD: 0.34 K/UL (ref 0.1–0.95)
MONOCYTES NFR BLD: 0.63 K/UL (ref 0.1–0.95)
MONOCYTES NFR BLD: 0.81 K/UL (ref 0.1–0.95)
MONOCYTES NFR BLD: 0.87 K/UL (ref 0.1–0.95)
MONOCYTES NFR BLD: 4 % (ref 2–12)
MONOCYTES NFR BLD: 6 % (ref 2–12)
MONOCYTES NFR BLD: 8 % (ref 2–12)
MONOCYTES NFR BLD: 8 % (ref 2–12)
NEUTROPHILS NFR BLD: 87 % (ref 43–80)
NEUTROPHILS NFR BLD: 88 % (ref 43–80)
NEUTROPHILS NFR BLD: 90 % (ref 43–80)
NEUTROPHILS NFR BLD: 95 % (ref 43–80)
NEUTS SEG NFR BLD: 9.09 K/UL (ref 1.8–7.3)
NEUTS SEG NFR BLD: 9.21 K/UL (ref 1.8–7.3)
NEUTS SEG NFR BLD: 9.23 K/UL (ref 1.8–7.3)
NEUTS SEG NFR BLD: 9.98 K/UL (ref 1.8–7.3)
NUCLEATED RED BLOOD CELLS: 2 PER 100 WBC
NUCLEATED RED BLOOD CELLS: 2 PER 100 WBC
O2 CONTENT: 13.3 ML/DL
O2 SATURATION: 96.2 % (ref 92–98.5)
O2HB: 95 % (ref 94–97)
OPERATOR ID: ABNORMAL
OSMOLALITY UR: 347 MOSM/KG (ref 300–900)
PARTIAL THROMBOPLASTIN TIME: 25.8 SEC (ref 24.5–35.1)
PATIENT TEMP: 37 C
PCO2: 33 MMHG (ref 35–45)
PEEP/CPAP: 10 CMH2O
PFO2: 1.66 MMHG/%
PH BLOOD GAS: 7.53 (ref 7.35–7.45)
PHOSPHATE SERPL-MCNC: 1.7 MG/DL (ref 2.5–4.5)
PLATELET, FLUORESCENCE: 113 K/UL (ref 130–450)
PLATELET, FLUORESCENCE: 122 K/UL (ref 130–450)
PLATELET, FLUORESCENCE: 126 K/UL (ref 130–450)
PLATELET, FLUORESCENCE: 134 K/UL (ref 130–450)
PMV BLD AUTO: 10.7 FL (ref 7–12)
PMV BLD AUTO: 11.4 FL (ref 7–12)
PMV BLD AUTO: 11.9 FL (ref 7–12)
PMV BLD AUTO: 12.1 FL (ref 7–12)
PO2: 83.2 MMHG (ref 75–100)
POTASSIUM SERPL-SCNC: 2.8 MMOL/L (ref 3.5–5)
POTASSIUM, UR: 10.4 MMOL/L
PROCALCITONIN SERPL-MCNC: 0.59 NG/ML (ref 0–0.08)
PROT SERPL-MCNC: 6 G/DL (ref 6.4–8.3)
PROTHROMBIN TIME: 15 SEC (ref 9.3–12.4)
RBC # BLD AUTO: 3.26 M/UL (ref 3.5–5.5)
RBC # BLD AUTO: 3.33 M/UL (ref 3.5–5.5)
RBC # BLD AUTO: 3.33 M/UL (ref 3.5–5.5)
RBC # BLD AUTO: 3.54 M/UL (ref 3.5–5.5)
RBC # BLD: ABNORMAL 10*6/UL
RI(T): 2.69
RR MECHANICAL: 26 B/MIN
SEND OUT REPORT: NORMAL
SERVICE CMNT-IMP: NORMAL
SERVICE CMNT-IMP: NORMAL
SODIUM SERPL-SCNC: 144 MMOL/L (ref 132–146)
SODIUM UR-SCNC: 139 MMOL/L
SOURCE, BLOOD GAS: ABNORMAL
SPECIMEN DESCRIPTION: NORMAL
SPECIMEN DESCRIPTION: NORMAL
TEST NAME: NORMAL
THB: 9.9 G/DL (ref 11.5–16.5)
TIME ANALYZED: 429
TRANSFUSION STATUS: NORMAL
TRANSFUSION STATUS: NORMAL
UNIT DIVISION: 0
UNIT DIVISION: 0
UNIT ISSUE DATE/TIME: NORMAL
UNIT ISSUE DATE/TIME: NORMAL
VT MECHANICAL: 400 ML
WBC OTHER # BLD: 10.3 K/UL (ref 4.5–11.5)
WBC OTHER # BLD: 10.5 K/UL (ref 4.5–11.5)
WBC OTHER # BLD: 11.5 K/UL (ref 4.5–11.5)
WBC OTHER # BLD: 9.6 K/UL (ref 4.5–11.5)

## 2023-08-24 PROCEDURE — 85730 THROMBOPLASTIN TIME PARTIAL: CPT

## 2023-08-24 PROCEDURE — 6370000000 HC RX 637 (ALT 250 FOR IP): Performed by: NURSE PRACTITIONER

## 2023-08-24 PROCEDURE — 85610 PROTHROMBIN TIME: CPT

## 2023-08-24 PROCEDURE — 6360000002 HC RX W HCPCS: Performed by: INTERNAL MEDICINE

## 2023-08-24 PROCEDURE — C9113 INJ PANTOPRAZOLE SODIUM, VIA: HCPCS

## 2023-08-24 PROCEDURE — 94003 VENT MGMT INPAT SUBQ DAY: CPT

## 2023-08-24 PROCEDURE — 36415 COLL VENOUS BLD VENIPUNCTURE: CPT

## 2023-08-24 PROCEDURE — 71045 X-RAY EXAM CHEST 1 VIEW: CPT

## 2023-08-24 PROCEDURE — 99291 CRITICAL CARE FIRST HOUR: CPT | Performed by: INTERNAL MEDICINE

## 2023-08-24 PROCEDURE — 2000000000 HC ICU R&B

## 2023-08-24 PROCEDURE — 84100 ASSAY OF PHOSPHORUS: CPT

## 2023-08-24 PROCEDURE — 99232 SBSQ HOSP IP/OBS MODERATE 35: CPT | Performed by: PHYSICIAN ASSISTANT

## 2023-08-24 PROCEDURE — 87449 NOS EACH ORGANISM AG IA: CPT

## 2023-08-24 PROCEDURE — 83935 ASSAY OF URINE OSMOLALITY: CPT

## 2023-08-24 PROCEDURE — 85025 COMPLETE CBC W/AUTO DIFF WBC: CPT

## 2023-08-24 PROCEDURE — 99239 HOSP IP/OBS DSCHRG MGMT >30: CPT | Performed by: FAMILY MEDICINE

## 2023-08-24 PROCEDURE — 2580000003 HC RX 258: Performed by: NURSE PRACTITIONER

## 2023-08-24 PROCEDURE — 6360000002 HC RX W HCPCS

## 2023-08-24 PROCEDURE — 80053 COMPREHEN METABOLIC PANEL: CPT

## 2023-08-24 PROCEDURE — 6370000000 HC RX 637 (ALT 250 FOR IP)

## 2023-08-24 PROCEDURE — 2580000003 HC RX 258

## 2023-08-24 PROCEDURE — 94640 AIRWAY INHALATION TREATMENT: CPT

## 2023-08-24 PROCEDURE — 82570 ASSAY OF URINE CREATININE: CPT

## 2023-08-24 PROCEDURE — 2500000003 HC RX 250 WO HCPCS: Performed by: NURSE PRACTITIONER

## 2023-08-24 PROCEDURE — 84300 ASSAY OF URINE SODIUM: CPT

## 2023-08-24 PROCEDURE — 6370000000 HC RX 637 (ALT 250 FOR IP): Performed by: INTERNAL MEDICINE

## 2023-08-24 PROCEDURE — 84145 PROCALCITONIN (PCT): CPT

## 2023-08-24 PROCEDURE — 99233 SBSQ HOSP IP/OBS HIGH 50: CPT | Performed by: CLINICAL NURSE SPECIALIST

## 2023-08-24 PROCEDURE — A4216 STERILE WATER/SALINE, 10 ML: HCPCS

## 2023-08-24 PROCEDURE — 84133 ASSAY OF URINE POTASSIUM: CPT

## 2023-08-24 PROCEDURE — 2580000003 HC RX 258: Performed by: INTERNAL MEDICINE

## 2023-08-24 PROCEDURE — 82805 BLOOD GASES W/O2 SATURATION: CPT

## 2023-08-24 PROCEDURE — 83735 ASSAY OF MAGNESIUM: CPT

## 2023-08-24 PROCEDURE — 87305 ASPERGILLUS AG IA: CPT

## 2023-08-24 RX ORDER — POTASSIUM CHLORIDE 29.8 MG/ML
20 INJECTION INTRAVENOUS
Status: COMPLETED | OUTPATIENT
Start: 2023-08-24 | End: 2023-08-24

## 2023-08-24 RX ADMIN — Medication 10 ML: at 07:38

## 2023-08-24 RX ADMIN — HYDRALAZINE HYDROCHLORIDE 25 MG: 25 TABLET, FILM COATED ORAL at 07:36

## 2023-08-24 RX ADMIN — METOPROLOL SUCCINATE 50 MG: 50 TABLET, EXTENDED RELEASE ORAL at 02:28

## 2023-08-24 RX ADMIN — POTASSIUM PHOSPHATE, MONOBASIC AND POTASSIUM PHOSPHATE, DIBASIC 30 MMOL: 224; 236 INJECTION, SOLUTION, CONCENTRATE INTRAVENOUS at 08:08

## 2023-08-24 RX ADMIN — CHLORHEXIDINE GLUCONATE 15 ML: 1.2 RINSE ORAL at 07:37

## 2023-08-24 RX ADMIN — POLYETHYLENE GLYCOL 3350 17 GRAM ORAL POWDER PACKET 17 G: at 07:37

## 2023-08-24 RX ADMIN — BUDESONIDE INHALATION 500 MCG: 0.5 SUSPENSION RESPIRATORY (INHALATION) at 20:29

## 2023-08-24 RX ADMIN — Medication 10 ML: at 21:01

## 2023-08-24 RX ADMIN — POTASSIUM CHLORIDE 20 MEQ: 29.8 INJECTION INTRAVENOUS at 13:38

## 2023-08-24 RX ADMIN — Medication 250 MG: at 18:02

## 2023-08-24 RX ADMIN — POTASSIUM CHLORIDE 20 MEQ: 29.8 INJECTION INTRAVENOUS at 10:51

## 2023-08-24 RX ADMIN — AMIODARONE HYDROCHLORIDE 200 MG: 200 TABLET ORAL at 07:36

## 2023-08-24 RX ADMIN — Medication 250 MG: at 10:49

## 2023-08-24 RX ADMIN — HYDRALAZINE HYDROCHLORIDE 25 MG: 25 TABLET, FILM COATED ORAL at 02:28

## 2023-08-24 RX ADMIN — CEFTOLOZANE AND TAZOBACTAM 1500 MG: 1; .5 INJECTION, POWDER, LYOPHILIZED, FOR SOLUTION INTRAVENOUS at 10:54

## 2023-08-24 RX ADMIN — ARFORMOTEROL TARTRATE 15 MCG: 15 SOLUTION RESPIRATORY (INHALATION) at 20:29

## 2023-08-24 RX ADMIN — Medication 10 ML: at 07:39

## 2023-08-24 RX ADMIN — POTASSIUM CHLORIDE 20 MEQ: 29.8 INJECTION INTRAVENOUS at 12:29

## 2023-08-24 RX ADMIN — HYDRALAZINE HYDROCHLORIDE 25 MG: 25 TABLET, FILM COATED ORAL at 20:59

## 2023-08-24 RX ADMIN — SODIUM CHLORIDE, PRESERVATIVE FREE 40 MG: 5 INJECTION INTRAVENOUS at 07:37

## 2023-08-24 RX ADMIN — LEVOTHYROXINE SODIUM 75 MCG: 0.07 TABLET ORAL at 06:26

## 2023-08-24 RX ADMIN — CEFTOLOZANE AND TAZOBACTAM 750 MG: 1; .5 INJECTION, POWDER, LYOPHILIZED, FOR SOLUTION INTRAVENOUS at 04:40

## 2023-08-24 RX ADMIN — BUDESONIDE INHALATION 500 MCG: 0.5 SUSPENSION RESPIRATORY (INHALATION) at 07:58

## 2023-08-24 RX ADMIN — LEVETIRACETAM 500 MG: 500 TABLET, FILM COATED ORAL at 07:36

## 2023-08-24 RX ADMIN — Medication 250 MG: at 20:59

## 2023-08-24 RX ADMIN — LEVETIRACETAM 500 MG: 500 TABLET, FILM COATED ORAL at 20:59

## 2023-08-24 RX ADMIN — ALBUTEROL SULFATE 2.5 MG: 2.5 SOLUTION RESPIRATORY (INHALATION) at 20:29

## 2023-08-24 RX ADMIN — CEFTOLOZANE AND TAZOBACTAM 1500 MG: 1; .5 INJECTION, POWDER, LYOPHILIZED, FOR SOLUTION INTRAVENOUS at 20:58

## 2023-08-24 RX ADMIN — STANDARDIZED SENNA CONCENTRATE 8.6 MG: 8.6 TABLET ORAL at 21:03

## 2023-08-24 RX ADMIN — METOPROLOL SUCCINATE 50 MG: 50 TABLET, EXTENDED RELEASE ORAL at 07:36

## 2023-08-24 RX ADMIN — ALBUTEROL SULFATE 2.5 MG: 2.5 SOLUTION RESPIRATORY (INHALATION) at 07:58

## 2023-08-24 RX ADMIN — ARFORMOTEROL TARTRATE 15 MCG: 15 SOLUTION RESPIRATORY (INHALATION) at 07:58

## 2023-08-24 RX ADMIN — Medication 250 MG: at 12:30

## 2023-08-24 RX ADMIN — CHLORHEXIDINE GLUCONATE 15 ML: 1.2 RINSE ORAL at 20:59

## 2023-08-24 RX ADMIN — METOPROLOL SUCCINATE 50 MG: 50 TABLET, EXTENDED RELEASE ORAL at 20:59

## 2023-08-24 ASSESSMENT — PULMONARY FUNCTION TESTS
PIF_VALUE: 34
PIF_VALUE: 35
PIF_VALUE: 32
PIF_VALUE: 36
PIF_VALUE: 32
PIF_VALUE: 37
PIF_VALUE: 34
PIF_VALUE: 29
PIF_VALUE: 30
PIF_VALUE: 28
PIF_VALUE: 28
PIF_VALUE: 31
PIF_VALUE: 22
PIF_VALUE: 32
PIF_VALUE: 44
PIF_VALUE: 30
PIF_VALUE: 34

## 2023-08-24 ASSESSMENT — PAIN SCALES - GENERAL
PAINLEVEL_OUTOF10: 0

## 2023-08-24 NOTE — PROGRESS NOTES
Palliative Care Department  [phone]  Palliative Care Progress Note  Provider Juan KirbyFRIDA - CNS     [place]  [id number]  Hospital Day: 6  Date of Initial Consult: 8/22/2023  Referring Provider: Jemima Armijo MD   Palliative Medicine was consulted for assistance with: Discussion    HPI:   Royal Silvestre is a 78 y.o. with a medical history of  seizures (on keppra), pulmonary sarcoidosis, atrial fibrillation on Eliquis, pulmonary hypertension, diabetes insipidus, hypothyroidism, hypertension, chronic kidney disease stage IIIa and combined systolic/diastolic heart failure who was admitted on 8/19/2023 from home with a CHIEF COMPLAINT of shortness of breath. Patient was recently discharged from [facility] (08/18/23) due to acute hypoxic respiratory failure. Patient has had numerous hospital admissions for the same complaint. Based on discharge summary she was offered discharge to [facility]/SNF, [place], but declined and was eventually discharged home with Bactrim. In the ED, patient was hypotensive, tachycardic, and hypoxic. Patient decompensated and went into PEA arrest, ROSC achieved. Patient was intubated and transferred to ICU for further medical management. CT head negative for any acute intracranial abnormality. CTA pulmonary remarkable for left perihilar and left lower lobe pneumonia. , emphysematous changes with superimposed interstitial pulmonary fibrosis, cardiomegaly with ectasia of the pulmonary trunk and pulmonary arteries which can be seen with pulmonary hypertension. Contrast is seen within the right atrium and extends into the inferior vena cava. These findings can be seen with right heart failure. There is no pulmonary embolus. CT abdomen positive for constipation and prominent umbilical hernia. EEG: showed moderate encephalopathy, moderate non specific cerebral dysfunction of bilateral frontal lobes, no seizures. Palliative medicine consulted for further assistance.

## 2023-08-24 NOTE — PLAN OF CARE
Problem: Chronic Conditions and Co-morbidities  Goal: Patient's chronic conditions and co-morbidity symptoms are monitored and maintained or improved  Outcome: Progressing     Problem: Discharge Planning  Goal: Discharge to home or other facility with appropriate resources  Outcome: Progressing     Problem: Safety - Adult  Goal: Free from fall injury  Outcome: Progressing     Problem: Pain  Goal: Verbalizes/displays adequate comfort level or baseline comfort level  Outcome: Progressing     Problem: Skin/Tissue Integrity  Goal: Absence of new skin breakdown  Description: 1. Monitor for areas of redness and/or skin breakdown  2. Assess vascular access sites hourly  3. Every 4-6 hours minimum:  Change oxygen saturation probe site  4. Every 4-6 hours:  If on nasal continuous positive airway pressure, respiratory therapy assess nares and determine need for appliance change or resting period.   Outcome: Progressing     Problem: ABCDS Injury Assessment  Goal: Absence of physical injury  Outcome: Progressing     Problem: Neurosensory - Adult  Goal: Achieves stable or improved neurological status  8/24/2023 0953 by Kesha Prieto RN  Outcome: Progressing     Problem: Neurosensory - Adult  Goal: Absence of seizures  Outcome: Progressing     Problem: Neurosensory - Adult  Goal: Remains free of injury related to seizures activity  Outcome: Progressing     Problem: Neurosensory - Adult  Goal: Achieves maximal functionality and self care  Outcome: Progressing     Problem: Respiratory - Adult  Goal: Achieves optimal ventilation and oxygenation  8/24/2023 0953 by Kesha Prieto RN  Outcome: Progressing     Problem: Cardiovascular - Adult  Goal: Maintains optimal cardiac output and hemodynamic stability  8/24/2023 0953 by Kesha Prieto RN  Outcome: Progressing     Problem: Cardiovascular - Adult  Goal: Absence of cardiac dysrhythmias or at baseline  Outcome: Progressing     Problem: Skin/Tissue Integrity - Adult  Goal: Skin

## 2023-08-24 NOTE — PROGRESS NOTES
[address] [facility]             Pulmonary & Critical Care Medicine                MICU Progress Note                 Written by: Sang Mendoza MD  Name: Goldy Beckwith : 1955       Age: 78 y.o. MR/Act #    : [id number],  Billing  #    : [id number]   Admit Date: 2023  2:21 AM LOS: 5,   Hospital Day: 6 Room #      : 1886/6923-R   PCP            : Reg Watts MD,   Referred by: Clementine Clay MD ICU Attending: MD Fredrick Rios date: 2023 11:34 AM   ICU Days:       4 Vent Days:    4 LOS: 5,                          Reason for ICU admission           Chief Complaint   Patient presents with    Shortness of Breath     (Started earlier last night, does wear home O2 on 6L)                          Brief HPI, Presentation & Synopsis                       69-year-old female with past medical history of acute on chronic respiratory failure, Pseudomonas pneumonia, stenotrophomonas pneumonia, pulmonary hypertension, pulmonary sarcoidosis IRMA on top of CKD, hyponatremia, anemia of chronic disease presented to the ER where she had PEA with cardiac arrest.  2 rounds of CPR and ROSC was achieved. She was intubated during the cardiac arrest.  Hypotension was noted and started on Levophed drip. CTA chest showed left-sided pneumonia. Admitted in ICU for septic shock, cardiac arrest and respiratory failure due to pneumonia.     Active problem list of yesterday:  Active Hospital Problems    Diagnosis Date Noted    Moderate protein-calorie malnutrition ([address]) [E44.0] 2023    Palliative care encounter [Z51.5] 2023    Cardiac arrest ([address]) [I46.9] 2023    Shortness of breath [R06.02] 2023    Altered mental status [R41.82] 2014                           Events of last night                      Continues to be ventilator dependent                      Subjective   2023               Continues to be Levophed dependent 5. Satisfactory position of the endotracheal tube 6. Advanced degenerative changes of the spine. 7. There is no pulmonary embolus. XR ABDOMEN FOR NG/OG/NE TUBE PLACEMENT    Result Date: 8/20/2023  EXAMINATION: ONE SUPINE XRAY VIEW(S) OF THE ABDOMEN 8/20/2023 12:32 pm COMPARISON: 08/19/2023 HISTORY: ORDERING SYSTEM PROVIDED HISTORY: Confirmation of course of NG/OG/NE tube and location of tip of tube TECHNOLOGIST PROVIDED HISTORY: Reason for exam:->Confirmation of course of NG/OG/NE tube and location of tip of tube Portable? ->Yes What reading provider will be dictating this exam?->CRC FINDINGS: KUB reveal nasogastric tube identified tip in the stomach. Bowel gas pattern is nonspecific. Mild increased markings in left lung base. Chronic changes seen throughout the remainder lung fields. Nasogastric tube identified tip in the stomach. Bowel gas pattern is nonspecific. XR ABDOMEN FOR NG/OG/NE TUBE PLACEMENT    Result Date: 8/19/2023  EXAMINATION: ONE SUPINE XRAY VIEW(S) OF THE ABDOMEN 8/19/2023 2:13 pm COMPARISON: Today at 0829 hours HISTORY: ORDERING SYSTEM PROVIDED HISTORY: Confirmation of course of NG/OG/NE tube and location of tip of tube TECHNOLOGIST PROVIDED HISTORY: Reason for exam:->Confirmation of course of NG/OG/NE tube and location of tip of tube Portable? ->Yes What reading provider will be dictating this exam?->CRC FINDINGS: NG tube courses below the diaphragm with distal tip in expected location of the stomach. Interstitial and hazy opacities are seen in the visualized lower lung fields. Satisfactory position of NG tube. XR ABDOMEN FOR NG/OG/NE TUBE PLACEMENT    Result Date: 8/19/2023  EXAMINATION: ONE SUPINE XRAY VIEW(S) OF THE ABDOMEN 8/19/2023 8:50 am COMPARISON: None. HISTORY: ORDERING SYSTEM PROVIDED HISTORY: OG tube placement TECHNOLOGIST PROVIDED HISTORY: Reason for exam:->OG tube placement Portable? ->Yes FINDINGS: A single view of the lower chest upper abdomen is obtained.

## 2023-08-24 NOTE — PROGRESS NOTES
[address] [facility]  Neurology Follow up    Date:  8/24/2023  Patient Name:  Lazarus Shoe  YOB: 1955  MRN: [id number]     Chief Complaint: Acute encephalopathy s/p cardiac arrest    History obtained from: EMR     [facility] of seizures on Keppra, sarcoidosis, A-fib on Eliquis, hypertension, PE/DVT, hypothyroidism, anemia of CKD    Assessment  Acute encephalopathy s/p cardiac arrest   --- the patient recently had PEA arrest s/p 2 rounds of CPR with ROSC achieved  --- Currently not sedated  patient opens eyes to loud voice, unable to follow commands   --- EEG showed no seizures, did show moderate encephalopathy   --- MRI with no evidence of acute findings- no findings to suggest anoxic injury     It is possible that she had a mild hypoxic injury, but this does not seem to be the primary cause of her AMS. Her PNA and requiring transfusion, other medical conditions are contributing       Plan  Correct other medical issues as able   Supportive care  Neuro will sign off, please call with questions       History of Present Illness:  Presenting for evaluation of recent cardiac arrest. The patient was recently discharged on 08/18/23 for acute hypoxic respiratory failure at that time the patient was discharged on Bactrim. Recently came to the ED and found to be hypotensive, tachycardic and hypoxic. The patient subsequently decompensated and ended up having PE arrest s/p 2 rounds of CPR with ROSC achievement. The patient was intubated sedated started on Levophed and transferred to the ICU. Subjective  MRI brain without evidence of anoxic injury or other acute findings. Received transfusion yesterday for hgb of 5.6 -- 9.2 today     On abx for pna     Exam remains unchanged.      No family at bedisde     ROS   Unable due to     Allergies:      No Known Allergies     Physical Examination  Vitals   Vitals:    08/24/23 0617 08/24/23 0700 08/24/23 0800 08/24/23 0900   BP:  (!) 153/102 (!) 162/104 3. Cardiomegaly with ectasia of the pulmonary trunk and pulmonary arteries   which can be seen with pulmonary hypertension   4. Contrast is seen within the right atrium and extends into the inferior   vena cava. These findings can be seen with right heart failure. 5. Satisfactory position of the endotracheal tube   6. Advanced degenerative changes of the spine. 7. There is no pulmonary embolus. CT ABDOMEN PELVIS W IV CONTRAST Additional Contrast? None   Final Result   1. Increased bibasilar areas of consolidation and or atelectasis. 2. The cecum is markedly distended and stool-filled raising the possibility   of constipation. As this area appears isolated, a developing cecal volvulus   could give a similar appearance. Short-term follow-up is recommended after   medical therapy. 3. Multilevel degenerative disc disease   4. Compression fractures involving L5 and T12, relatively stable. 5. High position of the nasogastric tube and this may be advanced into the   body 5-6 cm.   6. Prominent umbilical hernia         XR ABDOMEN FOR NG/OG/NE TUBE PLACEMENT   Final Result   1. The tip of the NG tube appears to be 6 cm inferior to the gastroesophageal   junction. The side hole is seen at the level of the gastroesophageal   junction. The NG tube should be advanced by an additional 5-10 cm if   possible. XR CHEST PORTABLE   Final Result   1. Increased interstitial prominence within the lungs concerning for   developing pulmonary edema or fluid overload   2. Increased bibasilar linear opacities suggest worsening atelectasis. 3. The position and alignment of the tubes and catheters appear within the   normal range         XR CHEST PORTABLE   Final Result   Cardiomediastinal silhouette appears enlarged. Prominent hilar/perihilar structures probably reflecting prominent pulmonary   vessels however cannot entirely exclude nodule/mass. Would advise a   follow-up PA chest radiograph.       Mild bibasilar atelectasis or infiltrate. No gross pneumothorax. Deformity of the right humeral head.          XR CHEST PORTABLE    (Results Pending)   XR CHEST PORTABLE    (Results Pending)   XR CHEST PORTABLE    (Results Pending)   XR CHEST PORTABLE    (Results Pending)   XR CHEST PORTABLE    (Results Pending)       EEG: showed moderate encephalopathy, moderate non specific cerebral dysfunction of bilateral frontal lobes, no seizures     All labs and imaging studies reviewed independently today    Emily Lopez PA-C  8/24/23  2724

## 2023-08-24 NOTE — PROGRESS NOTES
PEDRO PROGRESS NOTE      Chief complaint: Follow-up of ongoing antibiotic treatment for Stenotrophomonas pneumonia    The patient is a 78 y.o. female with history of atrial fibrillation, CHF, COPD, hypertension, C. difficile infection, ischemic colitis s/p right hemicolectomy with ileocecal anastomosis in 2008, pulmonary sarcoidosis, severe pulmonary hypertension, chronic hypoxic respiratory failure on continuous 4Lpm supplemental oxygen, recurrent hospital admissions for shortness of breath and more recently admitted from 08/09-08/18 for acute hypoxic respiratory failure secondary to Stenotrophomonas maltophilia pneumonia for which she was discharged on 7 days of high-dose Bactrim, readmitted on 08/19 with shortness of breath then went into PEA arrest with ROSC after 2 rounds of CPR. On admission, she was afebrile with no leukocytosis. Chest CTA showed left perihilar and left lower lobe pneumonia, emphysematous changes with superimposed interstitial pulmonary fibrosis, cardiomegaly with ectasia of pulmonary trunk and pulmonary arteries, essentially similar to that in July. CT abdomen and pelvis showed markedly distended cecum filled with stools, multilevel degenerative disc disease, L5 and T12 compression fractures. Respiratory pathogen PCR panel, urine Streptococcus pneumoniae and Legionella antigens, MRSA nares culture were negative. Sputum Gram stain and culture showed few polymorphonuclear leukocytes, rare epithelial cells, rare gram-positive cocci, rare gram-positive rods, normal tanesha. Urinalysis showed insignificant pyuria of 0-5 WBC with urine culture showing mixed tanesha. She received a dose of cefepime and tigecycline on admission then was given piperacillin-tazobactam and vancomycin from 08/20 to 08/21 then switched to linezolid and ceftazidime-tazobactam on 08/21. Subjective: Patient was seen and examined. She remains in critical condition, intubated.  She opens eyes to voice but not responding

## 2023-08-24 NOTE — PROGRESS NOTES
[facility] Inpatient   Resident Progress Note    S:  Hospital day: 5    Brief Synopsis: Mary Wu is a 78 y.o. female with a past medical history of pulmonary sarcoidosis, atrial fibrillation on Eliquis, pulmonary hypertension, diabetes insipidus, hypothyroidism, hypertension, chronic kidney disease stage IIIa and combined systolic/diastolic heart failure who presents to the emergency department for shortness of breath. Patient was recently discharged from [facility] (08/18/23) due to acute hypoxic respiratory failure. Patient has had numerous hospital admissions for the same complaint. Hypotensive, tachycardic and hypoxic at presentation. She did decompensate and ended up in PEA. 2 rounds of CPR were performed and ROSC achieved. Patient admitted to the intensive care unit for acute hypoxic respiratory failure requiring intubation and vasopressors. Overnight/Interim  Patient was seen and examined this morning. Intubated, no longer sedated and not on any pressors  Tracking with her eyes, blinks once for yes and twice for no questions, otherwise not following commands.   Patient continues to reach for lines and tubing when unrestrained   TFs in place     Cont meds:    sodium chloride      sodium chloride      sodium chloride 60 mL/hr at 08/24/23 0650    dexmedetomidine (PRECEDEX) IV infusion Stopped (08/21/23 2035)    [Held by provider] fentaNYL Stopped (08/22/23 0929)    [Held by provider] midazolam Stopped (08/22/23 0746)    dextrose      norepinephrine Stopped (08/22/23 1459)     Scheduled meds:    potassium phosphate IVPB  30 mmol IntraVENous Once    potassium & sodium phosphates  1 packet Oral 4x Daily    lidocaine PF  5 mL IntraDERmal Once    sodium chloride flush  5-40 mL IntraVENous 2 times per day    heparin flush  3 mL IntraVENous 2 times per day    ceftolozane-tazobactam (ZERBAXA) IVPB  750 mg IntraVENous Q8H    chlorhexidine  15 mL Mouth/Throat BID    amiodarone  200 mg Oral developing pulmonary edema or fluid overload   2. Increased bibasilar linear opacities suggest worsening atelectasis. 3. The position and alignment of the tubes and catheters appear within the   normal range         XR CHEST PORTABLE   Final Result   Cardiomediastinal silhouette appears enlarged. Prominent hilar/perihilar structures probably reflecting prominent pulmonary   vessels however cannot entirely exclude nodule/mass. Would advise a   follow-up PA chest radiograph. Mild bibasilar atelectasis or infiltrate. No gross pneumothorax. Deformity of the right humeral head. XR CHEST PORTABLE    (Results Pending)   XR CHEST PORTABLE    (Results Pending)   XR CHEST PORTABLE    (Results Pending)   XR CHEST PORTABLE    (Results Pending)   XR CHEST PORTABLE    (Results Pending)       A/P:  Principal Problem:    Cardiac arrest ([address])  Active Problems:    Altered mental status    Moderate protein-calorie malnutrition (HCC)    Shortness of breath    Palliative care encounter  Resolved Problems:    * No resolved hospital problems. *      Acute hypoxic respiratory failure  Status post cardiac arrest  Left lower lobe pneumonia  Respiratory acidosis  Severe pulmonary hypertension  Pulmonary sarcoidosis  History of moderate COPD, stage II by gold classification on 5 to 6 L at home  CTA pulm negative for pulmonary embolism  Lactic acid within normal limits  Delta troponin negative and EKG negative for acute ischemic changes  ProBNP trending up to 16,648 from 2,839. Chest x-ray showing possible fluid overload. Lasix currently held due to worsening kidney function. Monitor volume status   Respiratory panel negative  Legionella antigen and strep pneumo antigen negative  Respiratory culture showing rare G+ cocci and rods, epithelial cells and polymorphonuclear leucocytes on prelim report  Urine culture showing mixed G+ tanesha 10-50,000  ID on board.  Stopped Linezolid and continue and Zerbaxa   Blood culture Recommend facility due to multiple hospital admissions.       Electronically signed by Delaney Melendez MD on 8/24/2023 at 8:26 AM  This case was discussed with attending physician Dr. Sibley Friends

## 2023-08-24 NOTE — PLAN OF CARE
Problem: Safety - Medical Restraint  Goal: Remains free of injury from restraints (Restraint for Interference with Medical Device)  Description: INTERVENTIONS:  1. Determine that other, less restrictive measures have been tried or would not be effective before applying the restraint  2. Evaluate the patient's condition at the time of restraint application  3. Inform patient/family regarding the reason for restraint  4.  Q2H: Monitor safety, psychosocial status, comfort, nutrition and hydration  Outcome: Progressing  Flowsheets  Taken 8/24/2023 5985 by Krish Chatterjee RN  Remains free of injury from restraints (restraint for interference with medical device):   Determine that other, less restrictive measures have been tried or would not be effective before applying the restraint   Evaluate the patient's condition at the time of restraint application   Every 2 hours: Monitor safety, psychosocial status, comfort, nutrition and hydration   Inform patient/family regarding the reason for restraint    Problem: Neurosensory - Adult  Goal: Achieves stable or improved neurological status  Outcome: Progressing  Flowsheets (Taken 8/24/2023 0624)  Achieves stable or improved neurological status: Assess for and report changes in neurological status     Problem: Respiratory - Adult  Goal: Achieves optimal ventilation and oxygenation  Outcome: Progressing     Problem: Cardiovascular - Adult  Goal: Maintains optimal cardiac output and hemodynamic stability  Outcome: Progressing  Flowsheets (Taken 8/24/2023 0624)  Maintains optimal cardiac output and hemodynamic stability:   Monitor blood pressure and heart rate   Monitor urine output and notify Licensed Independent Practitioner for values outside of normal range   Assess for signs of decreased cardiac output

## 2023-08-24 NOTE — PROGRESS NOTES
Patient continues to reach for ETT and other necessary medical equipment while unrestrained and does not follow verbal redirection. Soft bilateral wrist restraints continued for patient safety.

## 2023-08-24 NOTE — PROGRESS NOTES
DAILY VENTILATOR WEANING ASSESSMENT PERFORMED    P/FIO2 Ratio = 166         (<100= do not Wean)                  Cs =  26                        (<32= Instability)  Plat. Pressure =23   MV =9.62  RSBI =    Instabilities:       Cardiovascular =       CNS =       Respiratory =       Metabolic =    Parameters    no    Wean per protocol  no    Ask Physician for a weaning plan no    Additional Comments:     Performed by Donna Mail, RCP RRT      Reference Table:    Cardiovascular     CNS      1. Mean BP less than or equal to 75   1. Neuromuscular blockade  2. Heart Rate greater than 130   2. RASS of -3, -4, -5  3. Myocardial Ischemia    3. RASS of +3, +4  4. Mechanical Assist Device    4. ICP greater than 15 or             Intracranial Hypertension         Respiratory      Metabolic  1. PEEP equal to or greater than 10cm/H20  1. Temp. (8hrs) less than 95 or > 103  2. Respiratory Rate greater than 35   2. WBC < 5000 or > 83247  3. Minute Volume greater than 15L  4. pH less than 7.30  5. Deteriorating chest X-ray    Patient currently with a CS of 26. SBT not attempted at this time. Awaiting further direction from critical care physician.

## 2023-08-24 NOTE — PLAN OF CARE
Problem: Respiratory - Adult  Goal: Achieves optimal ventilation and oxygenation  8/24/2023 0902 by Claude Saunders, RCP  Outcome: Progressing  8/24/2023 0624 by Darling Orellana RN  Outcome: Progressing

## 2023-08-24 NOTE — PROGRESS NOTES
08/24/23 0758   Vent Information   Ventilator ID [id number]   Vent Mode AC/PRVC   Ventilator Settings   Insp Time (sec) 0.7 sec   Vent Patient Data (Readings)   Flow Sensitivity 3 L/min   Static Compliance (L/cm H2O) 26   I Time/ I Time % 0.7 s   Backup Apnea On   Backup Rate 20 Breaths Per Minute   Backup Vt 400   Vent Alarm Settings   Low Pressure (cmH2O) 13.5 cmH2O   Low Minute Volume (lpm) 6 L/min   High Minute Volume (lpm) 18 L/min   Low Exhaled Vt (ml) 300 mL   High Exhaled Vt (ml) 800 mL   RR High (bpm) 35 br/min   Apnea (secs) 20 secs   Additional Respiratoray Assessments   Humidification Source Heated wire   Humidification Temp 36.9   Ambu Bag With Mask At Bedside Yes   ETT    Placement Date: 08/19/23   Present on Admission/Arrival: No  Placed By: In ED  Placement Verified By: Auscultation;Capnometry; Chest X-ray  Preoxygenation: Yes  Technique: Video laryngoscopy  Airway Type: Cuffed  Airway Tube Size: 7.5 mm  Location: Ora. ..    Secured At 22 cm   Measured From Lips   ETT Placement Right   Secured By Commercial tube chang   Site Assessment Dry   Treatment   $Treatment Type $Inhaled Therapy/Meds

## 2023-08-24 NOTE — PROGRESS NOTES
OT consult received and appreciated. Chart reviewed. Will hold evaluation, pt is not appropriate for Occupational Therapy services at this time. Will re-attempt as appropriate. Thank you.  Yvette Barnes, OTR/L # [id number]

## 2023-08-25 ENCOUNTER — APPOINTMENT (OUTPATIENT)
Dept: GENERAL RADIOLOGY | Age: 68
DRG: 870 | End: 2023-08-25
Payer: MEDICARE

## 2023-08-25 ENCOUNTER — HOSPITAL ENCOUNTER (OUTPATIENT)
Dept: OTHER | Age: 68
Setting detail: THERAPIES SERIES
Discharge: HOME | End: 2023-08-25

## 2023-08-25 LAB
AADO2: 239.1 MMHG
ALBUMIN SERPL-MCNC: 3.6 G/DL (ref 3.5–5.2)
ALP SERPL-CCNC: 94 U/L (ref 35–104)
ALT SERPL-CCNC: 116 U/L (ref 0–32)
ANION GAP SERPL CALCULATED.3IONS-SCNC: 10 MMOL/L (ref 7–16)
AST SERPL-CCNC: 55 U/L (ref 0–31)
B.E.: 7.9 MMOL/L (ref -3–3)
BASOPHILS # BLD: 0 K/UL (ref 0–0.2)
BASOPHILS NFR BLD: 0 % (ref 0–2)
BILIRUB SERPL-MCNC: 0.7 MG/DL (ref 0–1.2)
BUN SERPL-MCNC: 14 MG/DL (ref 6–23)
CALCIUM SERPL-MCNC: 9.1 MG/DL (ref 8.6–10.2)
CHLORIDE SERPL-SCNC: 104 MMOL/L (ref 98–107)
CO2 SERPL-SCNC: 30 MMOL/L (ref 22–29)
COHB: 1.1 % (ref 0–1.5)
CREAT SERPL-MCNC: 0.8 MG/DL (ref 0.5–1)
CRITICAL: ABNORMAL
DATE ANALYZED: ABNORMAL
DATE OF COLLECTION: ABNORMAL
EOSINOPHIL # BLD: 0 K/UL (ref 0.05–0.5)
EOSINOPHIL # BLD: 0.19 K/UL (ref 0.05–0.5)
EOSINOPHIL # BLD: 0.3 K/UL (ref 0.05–0.5)
EOSINOPHILS RELATIVE PERCENT: 0 % (ref 0–6)
EOSINOPHILS RELATIVE PERCENT: 2 % (ref 0–6)
EOSINOPHILS RELATIVE PERCENT: 3 % (ref 0–6)
ERYTHROCYTE [DISTWIDTH] IN BLOOD BY AUTOMATED COUNT: 17.6 % (ref 11.5–15)
ERYTHROCYTE [DISTWIDTH] IN BLOOD BY AUTOMATED COUNT: 17.8 % (ref 11.5–15)
ERYTHROCYTE [DISTWIDTH] IN BLOOD BY AUTOMATED COUNT: 17.9 % (ref 11.5–15)
FIO2: 50 %
GFR SERPL CREATININE-BSD FRML MDRD: >60 ML/MIN/1.73M2
GLUCOSE SERPL-MCNC: 96 MG/DL (ref 74–99)
HCO3: 31.2 MMOL/L (ref 22–26)
HCT VFR BLD AUTO: 29.3 % (ref 34–48)
HCT VFR BLD AUTO: 29.5 % (ref 34–48)
HCT VFR BLD AUTO: 29.9 % (ref 34–48)
HGB BLD-MCNC: 9.1 G/DL (ref 11.5–15.5)
HGB BLD-MCNC: 9.2 G/DL (ref 11.5–15.5)
HGB BLD-MCNC: 9.5 G/DL (ref 11.5–15.5)
HHB: 7.6 % (ref 0–5)
INR PPP: 1.3
LAB: ABNORMAL
LYMPHOCYTES NFR BLD: 0.48 K/UL (ref 1.5–4)
LYMPHOCYTES NFR BLD: 0.5 K/UL (ref 1.5–4)
LYMPHOCYTES NFR BLD: 0.68 K/UL (ref 1.5–4)
LYMPHOCYTES RELATIVE PERCENT: 4 % (ref 20–42)
LYMPHOCYTES RELATIVE PERCENT: 4 % (ref 20–42)
LYMPHOCYTES RELATIVE PERCENT: 6 % (ref 20–42)
Lab: ABNORMAL
MAGNESIUM SERPL-MCNC: 1.8 MG/DL (ref 1.6–2.6)
MCH RBC QN AUTO: 25.9 PG (ref 26–35)
MCH RBC QN AUTO: 26.1 PG (ref 26–35)
MCH RBC QN AUTO: 26.5 PG (ref 26–35)
MCHC RBC AUTO-ENTMCNC: 31.1 G/DL (ref 32–34.5)
MCHC RBC AUTO-ENTMCNC: 31.2 G/DL (ref 32–34.5)
MCHC RBC AUTO-ENTMCNC: 31.8 G/DL (ref 32–34.5)
MCV RBC AUTO: 83.2 FL (ref 80–99.9)
MCV RBC AUTO: 83.5 FL (ref 80–99.9)
MCV RBC AUTO: 83.6 FL (ref 80–99.9)
METHB: 0.4 % (ref 0–1.5)
MODE: AC
MONOCYTES NFR BLD: 0.2 K/UL (ref 0.1–0.95)
MONOCYTES NFR BLD: 0.29 K/UL (ref 0.1–0.95)
MONOCYTES NFR BLD: 0.39 K/UL (ref 0.1–0.95)
MONOCYTES NFR BLD: 2 % (ref 2–12)
MONOCYTES NFR BLD: 3 % (ref 2–12)
MONOCYTES NFR BLD: 4 % (ref 2–12)
MYELOCYTES ABSOLUTE COUNT: 0.1 K/UL
MYELOCYTES: 1 %
NEUTROPHILS NFR BLD: 89 % (ref 43–80)
NEUTROPHILS NFR BLD: 91 % (ref 43–80)
NEUTROPHILS NFR BLD: 92 % (ref 43–80)
NEUTS SEG NFR BLD: 10.23 K/UL (ref 1.8–7.3)
NEUTS SEG NFR BLD: 10.5 K/UL (ref 1.8–7.3)
NEUTS SEG NFR BLD: 9.85 K/UL (ref 1.8–7.3)
NUCLEATED RED BLOOD CELLS: 1 PER 100 WBC
NUCLEATED RED BLOOD CELLS: 2 PER 100 WBC
O2 CONTENT: 13.6 ML/DL
O2 SATURATION: 92.3 % (ref 92–98.5)
O2HB: 90.9 % (ref 94–97)
OPERATOR ID: ABNORMAL
PARTIAL THROMBOPLASTIN TIME: 27.3 SEC (ref 24.5–35.1)
PATIENT TEMP: 37 C
PCO2: 38.6 MMHG (ref 35–45)
PEEP/CPAP: 10 CMH2O
PFO2: 1.23 MMHG/%
PH BLOOD GAS: 7.53 (ref 7.35–7.45)
PHOSPHATE SERPL-MCNC: 1.9 MG/DL (ref 2.5–4.5)
PLATELET, FLUORESCENCE: 105 K/UL (ref 130–450)
PLATELET, FLUORESCENCE: 110 K/UL (ref 130–450)
PLATELET, FLUORESCENCE: 115 K/UL (ref 130–450)
PMV BLD AUTO: 10.3 FL (ref 7–12)
PMV BLD AUTO: 11.1 FL (ref 7–12)
PMV BLD AUTO: 13.2 FL (ref 7–12)
PO2: 61.5 MMHG (ref 75–100)
POTASSIUM SERPL-SCNC: 3.2 MMOL/L (ref 3.5–5)
PROT SERPL-MCNC: 5.9 G/DL (ref 6.4–8.3)
PROTHROMBIN TIME: 14.4 SEC (ref 9.3–12.4)
RBC # BLD AUTO: 3.52 M/UL (ref 3.5–5.5)
RBC # BLD AUTO: 3.53 M/UL (ref 3.5–5.5)
RBC # BLD AUTO: 3.58 M/UL (ref 3.5–5.5)
RBC # BLD: ABNORMAL 10*6/UL
RI(T): 3.89
RR MECHANICAL: 20 B/MIN
SODIUM SERPL-SCNC: 144 MMOL/L (ref 132–146)
SOURCE, BLOOD GAS: ABNORMAL
THB: 10.6 G/DL (ref 11.5–16.5)
TIME ANALYZED: 531
VT MECHANICAL: 400 ML
WBC OTHER # BLD: 11.1 K/UL (ref 4.5–11.5)
WBC OTHER # BLD: 11.1 K/UL (ref 4.5–11.5)
WBC OTHER # BLD: 11.5 K/UL (ref 4.5–11.5)

## 2023-08-25 PROCEDURE — 85730 THROMBOPLASTIN TIME PARTIAL: CPT

## 2023-08-25 PROCEDURE — 6360000002 HC RX W HCPCS

## 2023-08-25 PROCEDURE — 99231 SBSQ HOSP IP/OBS SF/LOW 25: CPT | Performed by: NURSE PRACTITIONER

## 2023-08-25 PROCEDURE — 2580000003 HC RX 258

## 2023-08-25 PROCEDURE — 94640 AIRWAY INHALATION TREATMENT: CPT

## 2023-08-25 PROCEDURE — 36569 INSJ PICC 5 YR+ W/O IMAGING: CPT

## 2023-08-25 PROCEDURE — 2580000003 HC RX 258: Performed by: INTERNAL MEDICINE

## 2023-08-25 PROCEDURE — A4216 STERILE WATER/SALINE, 10 ML: HCPCS

## 2023-08-25 PROCEDURE — 6360000002 HC RX W HCPCS: Performed by: NURSE PRACTITIONER

## 2023-08-25 PROCEDURE — 80053 COMPREHEN METABOLIC PANEL: CPT

## 2023-08-25 PROCEDURE — 2000000000 HC ICU R&B

## 2023-08-25 PROCEDURE — 85025 COMPLETE CBC W/AUTO DIFF WBC: CPT

## 2023-08-25 PROCEDURE — 94003 VENT MGMT INPAT SUBQ DAY: CPT

## 2023-08-25 PROCEDURE — C9113 INJ PANTOPRAZOLE SODIUM, VIA: HCPCS

## 2023-08-25 PROCEDURE — C1751 CATH, INF, PER/CENT/MIDLINE: HCPCS

## 2023-08-25 PROCEDURE — 2500000003 HC RX 250 WO HCPCS: Performed by: INTERNAL MEDICINE

## 2023-08-25 PROCEDURE — 99291 CRITICAL CARE FIRST HOUR: CPT | Performed by: INTERNAL MEDICINE

## 2023-08-25 PROCEDURE — 76937 US GUIDE VASCULAR ACCESS: CPT

## 2023-08-25 PROCEDURE — 82805 BLOOD GASES W/O2 SATURATION: CPT

## 2023-08-25 PROCEDURE — 85610 PROTHROMBIN TIME: CPT

## 2023-08-25 PROCEDURE — 6370000000 HC RX 637 (ALT 250 FOR IP): Performed by: INTERNAL MEDICINE

## 2023-08-25 PROCEDURE — 84100 ASSAY OF PHOSPHORUS: CPT

## 2023-08-25 PROCEDURE — 2580000003 HC RX 258: Performed by: NURSE PRACTITIONER

## 2023-08-25 PROCEDURE — 02HV33Z INSERTION OF INFUSION DEVICE INTO SUPERIOR VENA CAVA, PERCUTANEOUS APPROACH: ICD-10-PCS | Performed by: FAMILY MEDICINE

## 2023-08-25 PROCEDURE — 74018 RADEX ABDOMEN 1 VIEW: CPT

## 2023-08-25 PROCEDURE — 2500000003 HC RX 250 WO HCPCS: Performed by: NURSE PRACTITIONER

## 2023-08-25 PROCEDURE — 99232 SBSQ HOSP IP/OBS MODERATE 35: CPT | Performed by: FAMILY MEDICINE

## 2023-08-25 PROCEDURE — 6360000002 HC RX W HCPCS: Performed by: INTERNAL MEDICINE

## 2023-08-25 PROCEDURE — 71045 X-RAY EXAM CHEST 1 VIEW: CPT

## 2023-08-25 PROCEDURE — 83735 ASSAY OF MAGNESIUM: CPT

## 2023-08-25 PROCEDURE — 6370000000 HC RX 637 (ALT 250 FOR IP)

## 2023-08-25 RX ORDER — METOPROLOL TARTRATE 50 MG/1
50 TABLET, FILM COATED ORAL 2 TIMES DAILY
Status: DISCONTINUED | OUTPATIENT
Start: 2023-08-25 | End: 2023-08-29

## 2023-08-25 RX ORDER — PREDNISONE 5 MG/1
5 TABLET ORAL DAILY
Status: DISCONTINUED | OUTPATIENT
Start: 2023-08-25 | End: 2023-08-28

## 2023-08-25 RX ORDER — MAGNESIUM SULFATE IN WATER 40 MG/ML
2000 INJECTION, SOLUTION INTRAVENOUS ONCE
Status: COMPLETED | OUTPATIENT
Start: 2023-08-25 | End: 2023-08-25

## 2023-08-25 RX ORDER — LEVETIRACETAM 100 MG/ML
500 SOLUTION ORAL 2 TIMES DAILY
Status: DISCONTINUED | OUTPATIENT
Start: 2023-08-25 | End: 2023-09-13 | Stop reason: HOSPADM

## 2023-08-25 RX ADMIN — ARFORMOTEROL TARTRATE 15 MCG: 15 SOLUTION RESPIRATORY (INHALATION) at 08:36

## 2023-08-25 RX ADMIN — AMIODARONE HYDROCHLORIDE 200 MG: 200 TABLET ORAL at 10:19

## 2023-08-25 RX ADMIN — Medication 250 MG: at 21:34

## 2023-08-25 RX ADMIN — CHLORHEXIDINE GLUCONATE 15 ML: 1.2 RINSE ORAL at 21:25

## 2023-08-25 RX ADMIN — PREDNISONE 5 MG: 5 TABLET ORAL at 15:24

## 2023-08-25 RX ADMIN — POTASSIUM PHOSPHATE, MONOBASIC AND POTASSIUM PHOSPHATE, DIBASIC 30 MMOL: 224; 236 INJECTION, SOLUTION, CONCENTRATE INTRAVENOUS at 11:49

## 2023-08-25 RX ADMIN — BUDESONIDE INHALATION 500 MCG: 0.5 SUSPENSION RESPIRATORY (INHALATION) at 19:09

## 2023-08-25 RX ADMIN — Medication 250 MG: at 15:24

## 2023-08-25 RX ADMIN — POTASSIUM PHOSPHATE, MONOBASIC AND POTASSIUM PHOSPHATE, DIBASIC 30 MMOL: 224; 236 INJECTION, SOLUTION, CONCENTRATE INTRAVENOUS at 07:42

## 2023-08-25 RX ADMIN — ARFORMOTEROL TARTRATE 15 MCG: 15 SOLUTION RESPIRATORY (INHALATION) at 19:09

## 2023-08-25 RX ADMIN — METOPROLOL TARTRATE 50 MG: 50 TABLET, FILM COATED ORAL at 21:34

## 2023-08-25 RX ADMIN — HEPARIN 300 UNITS: 100 SYRINGE at 21:26

## 2023-08-25 RX ADMIN — Medication 10 ML: at 10:23

## 2023-08-25 RX ADMIN — LEVOTHYROXINE SODIUM 75 MCG: 0.07 TABLET ORAL at 06:30

## 2023-08-25 RX ADMIN — SODIUM CHLORIDE, PRESERVATIVE FREE 40 MG: 5 INJECTION INTRAVENOUS at 10:19

## 2023-08-25 RX ADMIN — Medication 250 MG: at 11:07

## 2023-08-25 RX ADMIN — SODIUM CHLORIDE: 9 INJECTION, SOLUTION INTRAVENOUS at 20:39

## 2023-08-25 RX ADMIN — Medication 10 ML: at 21:25

## 2023-08-25 RX ADMIN — MAGNESIUM SULFATE HEPTAHYDRATE 2000 MG: 40 INJECTION, SOLUTION INTRAVENOUS at 06:40

## 2023-08-25 RX ADMIN — CEFTOLOZANE AND TAZOBACTAM 3000 MG: 1; .5 INJECTION, POWDER, LYOPHILIZED, FOR SOLUTION INTRAVENOUS at 12:11

## 2023-08-25 RX ADMIN — STANDARDIZED SENNA CONCENTRATE 8.6 MG: 8.6 TABLET ORAL at 21:34

## 2023-08-25 RX ADMIN — CEFTOLOZANE AND TAZOBACTAM 3000 MG: 1; .5 INJECTION, POWDER, LYOPHILIZED, FOR SOLUTION INTRAVENOUS at 20:41

## 2023-08-25 RX ADMIN — POTASSIUM BICARBONATE 40 MEQ: 782 TABLET, EFFERVESCENT ORAL at 21:34

## 2023-08-25 RX ADMIN — CEFTOLOZANE AND TAZOBACTAM 1500 MG: 1; .5 INJECTION, POWDER, LYOPHILIZED, FOR SOLUTION INTRAVENOUS at 04:01

## 2023-08-25 RX ADMIN — POTASSIUM BICARBONATE 40 MEQ: 782 TABLET, EFFERVESCENT ORAL at 11:07

## 2023-08-25 RX ADMIN — LEVETIRACETAM 500 MG: 100 SOLUTION ORAL at 21:33

## 2023-08-25 RX ADMIN — HYDRALAZINE HYDROCHLORIDE 25 MG: 25 TABLET, FILM COATED ORAL at 10:19

## 2023-08-25 RX ADMIN — HYDRALAZINE HYDROCHLORIDE 25 MG: 25 TABLET, FILM COATED ORAL at 21:34

## 2023-08-25 RX ADMIN — Medication 10 ML: at 21:27

## 2023-08-25 RX ADMIN — BUDESONIDE INHALATION 500 MCG: 0.5 SUSPENSION RESPIRATORY (INHALATION) at 08:36

## 2023-08-25 RX ADMIN — CHLORHEXIDINE GLUCONATE 15 ML: 1.2 RINSE ORAL at 08:02

## 2023-08-25 RX ADMIN — METOPROLOL TARTRATE 50 MG: 50 TABLET, FILM COATED ORAL at 11:07

## 2023-08-25 RX ADMIN — LEVETIRACETAM 500 MG: 100 SOLUTION ORAL at 10:19

## 2023-08-25 ASSESSMENT — PULMONARY FUNCTION TESTS
PIF_VALUE: 29
PIF_VALUE: 26
PIF_VALUE: 27
PIF_VALUE: 32
PIF_VALUE: 31
PIF_VALUE: 32
PIF_VALUE: 35
PIF_VALUE: 30
PIF_VALUE: 32
PIF_VALUE: 33
PIF_VALUE: 30
PIF_VALUE: 29
PIF_VALUE: 28
PIF_VALUE: 30
PIF_VALUE: 28
PIF_VALUE: 30
PIF_VALUE: 33
PIF_VALUE: 31
PIF_VALUE: 36
PIF_VALUE: 30
PIF_VALUE: 28
PIF_VALUE: 33
PIF_VALUE: 29
PIF_VALUE: 30
PIF_VALUE: 28
PIF_VALUE: 26
PIF_VALUE: 25
PIF_VALUE: 28
PIF_VALUE: 46

## 2023-08-25 ASSESSMENT — PAIN SCALES - GENERAL
PAINLEVEL_OUTOF10: 0

## 2023-08-25 NOTE — PLAN OF CARE
Problem: Chronic Conditions and Co-morbidities  Goal: Patient's chronic conditions and co-morbidity symptoms are monitored and maintained or improved  Outcome: Progressing     Problem: Discharge Planning  Goal: Discharge to home or other facility with appropriate resources  Outcome: Progressing     Problem: Safety - Adult  Goal: Free from fall injury  Outcome: Progressing     Problem: Pain  Goal: Verbalizes/displays adequate comfort level or baseline comfort level  Outcome: Progressing     Problem: Skin/Tissue Integrity  Goal: Absence of new skin breakdown  Description: 1. Monitor for areas of redness and/or skin breakdown  2. Assess vascular access sites hourly  3. Every 4-6 hours minimum:  Change oxygen saturation probe site  4. Every 4-6 hours:  If on nasal continuous positive airway pressure, respiratory therapy assess nares and determine need for appliance change or resting period.   Outcome: Progressing     Problem: Neurosensory - Adult  Goal: Achieves stable or improved neurological status  Outcome: Progressing     Problem: Neurosensory - Adult  Goal: Absence of seizures  Outcome: Progressing     Problem: Neurosensory - Adult  Goal: Remains free of injury related to seizures activity  Outcome: Progressing     Problem: Respiratory - Adult  Goal: Achieves optimal ventilation and oxygenation  Outcome: Progressing     Problem: Cardiovascular - Adult  Goal: Maintains optimal cardiac output and hemodynamic stability  Outcome: Progressing     Problem: Skin/Tissue Integrity - Adult  Goal: Skin integrity remains intact  Outcome: Progressing     Problem: Skin/Tissue Integrity - Adult  Goal: Incisions, wounds, or drain sites healing without S/S of infection  Outcome: Progressing     Problem: Skin/Tissue Integrity - Adult  Goal: Oral mucous membranes remain intact  Outcome: Progressing     Problem: Safety - Medical Restraint  Goal: Remains free of injury from restraints (Restraint for Interference with Medical

## 2023-08-25 NOTE — PROGRESS NOTES
Patient continues to attempt to pull at lines and tubes when restraints are loosened. Attempts made to verbally redirect and educate have been unsuccessful. Bilateral soft wrist restraints continued.

## 2023-08-25 NOTE — PROGRESS NOTES
August 20, 2023 4:01 p.m. HISTORY: ORDERING SYSTEM PROVIDED HISTORY: respiratory distress TECHNOLOGIST PROVIDED HISTORY: Reason for exam:->respiratory distress What reading provider will be dictating this exam?->CRC FINDINGS: AP semi upright portable chest was obtained with the patient's significant rotation to the left. There is diffuse haziness in the right hemithorax. There is interval clearing of left retrocardiac opacity. Again there are prominent bilateral central pulmonary arteries. ET and enteric tubes are in good position. Progressing pulmonary edema. Interval clearing of left retrocardiac opacity. XR CHEST PORTABLE    Result Date: 8/20/2023  EXAMINATION: ONE X-RAY VIEW OF THE CHEST 8/20/2023 4:02 pm COMPARISON: 08/19/2023 HISTORY: ORDERING SYSTEM PROVIDED HISTORY: CHF TECHNOLOGIST PROVIDED HISTORY: Reason for exam:->CHF What reading provider will be dictating this exam?->CRC FINDINGS: AP supine portable chest shows ET tube with distal tip, 2.7 cm above the jaz. There is an enteric tube crossing the diaphragm. There is left retrocardiac opacity. There are prominent bilateral central pulmonary arteries. There is old healed fracture deformity of the right humeral head. There is degenerative osteoarthritis of the left shoulder joint. Central pulmonary artery hypertension. Left retrocardiac atelectasis or pneumonic consolidation. XR CHEST PORTABLE    Result Date: 8/19/2023  EXAMINATION: ONE XRAY VIEW OF THE CHEST 8/19/2023 8:50 am COMPARISON: 10/19/2023 HISTORY: ORDERING SYSTEM PROVIDED HISTORY: et tube TECHNOLOGIST PROVIDED HISTORY: Reason for exam:->et tube FINDINGS: The tip of the endotracheal tube is 3.5 cm above the jaz. There is increased bibasilar parenchymal densities concerning for pneumonia and or atelectasis. This process now silhouettes the left diaphragm. The heart border appears stable.   There is increased interstitial prominence within the lungs concerning for elements of the encounter were performed by me (> 85 % time). The medications & laboratory data and imagery was discussed and adjusted where necessary. Key issues of the case were discussed among consultants. This patient has a high probability of sudden clinically significant deterioration. I managed/supervised life or organ supporting interventions that required frequent physician assessment. I devoted my full attention to the direct care of this patient for the period of time indicated below. In addition to above, time was devoted to teaching and to any procedure. NOTE: This report, in part or full, may have been transcribed using voice recognition software. Every effort was made to ensure accuracy; however, inadvertent computerized transcription errors may be present. Please excuse any transcriptional grammatical or spelling errors that may have escaped my editorial review. Total critical care time caring for this patient with life threatening, unstable organ failure, including direct patient contact, management of life support systems, review of data including imaging and labs, discussions with other team members and physicians is at least 28 Min so far today, excluding procedures.       Electronically signed by Román Perez MD on 8/25/2023 at 10:39 AM  [address]

## 2023-08-25 NOTE — PLAN OF CARE
Problem: Chronic Conditions and Co-morbidities  Goal: Patient's chronic conditions and co-morbidity symptoms are monitored and maintained or improved  8/25/2023 1401 by Joyce Meraz RN  Outcome: Progressing  8/25/2023 0612 by Jose Manuel Bar RN  Outcome: Progressing     Problem: Discharge Planning  Goal: Discharge to home or other facility with appropriate resources  8/25/2023 1401 by Joyce Meraz RN  Outcome: Progressing  8/25/2023 0612 by Jose Manuel Bar RN  Outcome: Progressing     Problem: Safety - Adult  Goal: Free from fall injury  8/25/2023 1401 by Joyce eMraz RN  Outcome: Progressing  Flowsheets (Taken 8/25/2023 1400)  Free From Fall Injury: Instruct family/caregiver on patient safety  8/25/2023 0612 by Jose Manuel Bar RN  Outcome: Progressing     Problem: Pain  Goal: Verbalizes/displays adequate comfort level or baseline comfort level  8/25/2023 1401 by Joyce Meraz RN  Outcome: Progressing  8/25/2023 0612 by Jose Manuel Bar RN  Outcome: Progressing     Problem: Skin/Tissue Integrity  Goal: Absence of new skin breakdown  Description: 1. Monitor for areas of redness and/or skin breakdown  2. Assess vascular access sites hourly  3. Every 4-6 hours minimum:  Change oxygen saturation probe site  4. Every 4-6 hours:  If on nasal continuous positive airway pressure, respiratory therapy assess nares and determine need for appliance change or resting period.   8/25/2023 1401 by Joyce Meraz RN  Outcome: Progressing  8/25/2023 0612 by Jose Manuel Bar RN  Outcome: Progressing     Problem: ABCDS Injury Assessment  Goal: Absence of physical injury  8/25/2023 1401 by Joyce Meraz RN  Outcome: Progressing  8/25/2023 0612 by Jose Manuel Bar RN  Outcome: Progressing     Problem: Neurosensory - Adult  Goal: Achieves stable or improved neurological status  8/25/2023 1401 by Joyce Meraz RN  Outcome: Progressing  8/25/2023 0612 by Jose Manuel Bar RN  Outcome: Progressing  Goal: Achieves maximal functionality and self care  8/25/2023 1401 by Campbell Gary RN  Outcome: Progressing  8/25/2023 0612 by Lubna Peck RN  Outcome: Progressing     Problem: Respiratory - Adult  Goal: Achieves optimal ventilation and oxygenation  8/25/2023 1401 by Campbell Gary RN  Outcome: Progressing  8/25/2023 0612 by Lubna Peck RN  Outcome: Progressing     Problem: Cardiovascular - Adult  Goal: Absence of cardiac dysrhythmias or at baseline  Outcome: Progressing     Problem: Skin/Tissue Integrity - Adult  Goal: Skin integrity remains intact  8/25/2023 1401 by Campbell Gary RN  Outcome: Progressing  Flowsheets (Taken 8/25/2023 1400)  Skin Integrity Remains Intact: Monitor for areas of redness and/or skin breakdown  8/25/2023 0612 by Lubna Peck RN  Outcome: Progressing  Goal: Incisions, wounds, or drain sites healing without S/S of infection  8/25/2023 0612 by Lubna Peck RN  Outcome: Progressing  Goal: Oral mucous membranes remain intact  8/25/2023 1401 by Campbell Gary RN  Outcome: Progressing  Flowsheets (Taken 8/25/2023 1400)  Oral Mucous Membranes Remain Intact: Implement preventative oral hygiene regimen  8/25/2023 0612 by Lubna Peck RN  Outcome: Progressing     Problem: Safety - Medical Restraint  Goal: Remains free of injury from restraints (Restraint for Interference with Medical Device)  Description: INTERVENTIONS:  1. Determine that other, less restrictive measures have been tried or would not be effective before applying the restraint  2. Evaluate the patient's condition at the time of restraint application  3. Inform patient/family regarding the reason for restraint  4.  Q2H: Monitor safety, psychosocial status, comfort, nutrition and hydration  8/25/2023 1401 by Campbell Gary RN  Outcome: Progressing  Flowsheets (Taken 8/25/2023 0800)  Remains free of injury from restraints (restraint for interference with medical device): Every 2 hours: Monitor safety, psychosocial status, comfort, nutrition and hydration  8/25/2023 0612 by

## 2023-08-25 NOTE — PROGRESS NOTES
Palliative Care Department  [phone]  Palliative Care Progress Note  Provider Santiago Guillaume, APRN - CNP     [facility]  [id number]  Hospital Day: 7  Date of Initial Consult: 8/22/2023  Referring Provider: Jemima Armijo MD   Palliative Medicine was consulted for assistance with: Discussion    HPI:   Royal Silvestre is a 78 y.o. with a medical history of  pulmonary sarcoidosis, atrial fibrillation on Eliquis, pulmonary hypertension, diabetes insipidus, hypothyroidism, hypertension, chronic kidney disease stage IIIa and combined systolic/diastolic heart failure who was admitted on 8/19/2023 from home with a CHIEF COMPLAINT of shortness of breath. Patient was recently discharged from [facility] (08/18/23) due to acute hypoxic respiratory failure. Patient has had numerous hospital admissions for the same complaint. Based on discharge summary she was offered discharge to [facility]SNF, [place], but declined and was eventually discharged home with Bactrim. In the ED, patient was hypotensive, tachycardic, and hypoxic. Patient decompensated and went into PEA, ROSC achieved. Patient was intubated and transferred to ICU for further medical management. CT head negative for any acute intracranial abnormality. CTA pulmonary remarkable for left perihilar and left lower lobe pneumonia. , emphysematous changes with superimposed interstitial pulmonary fibrosis, cardiomegaly with ectasia of the pulmonary trunk and pulmonary arteries which can be seen with pulmonary hypertension. Contrast is seen within the right atrium and extends into the inferior vena cava. These findings can be seen with right heart failure. There is no pulmonary embolus. CT abdomen positive for constipation and prominent umbilical hernia. EEG: showed moderate encephalopathy, moderate non specific cerebral dysfunction of bilateral frontal lobes, no seizures. Palliative medicine consulted for further assistance.        ASSESSMENT/PLAN: Pertinent Hospital Diagnoses     Cardiac arrest  Suspected anoxic encephalopathy  Acute on chronic respiratory failure  Severe pulmonary hypertension  Pulmonary sarcoidosis pneumonia      Palliative Care Encounter / Counseling Regarding Goals of Care  Please see detailed goals of care discussion as below  At this time, Charisse Felt, Does Not have capacity for medical decision-making. Capacity is time limited and situation/question specific  During encounter [facility] was surrogate medical decision-maker  Outcome of goals of care meeting:   Continue current medical management  Plan for trach/PEG likely next week   Code status Full Code  Advanced Directives: no POA or living will in epic  Surrogate/Legal NOK:  Elly Mandujano (spouse) [phone], [address] [facility] Jeb Pedersen (child) [phone]    Spiritual assessment: no spiritual distress identified  Bereavement and grief: to be determined  Referrals to: none today  SUBJECTIVE:     Current medical issues leading to Palliative Medicine involvement include   Active Hospital Problems    Diagnosis Date Noted    Moderate protein-calorie malnutrition ([address]) [E44.0] 08/22/2023    Palliative care encounter [Z51.5] 08/22/2023    Cardiac arrest ([address]) [I46.9] 08/19/2023    Shortness of breath [R06.02] 08/19/2023    Altered mental status [R41.82] 06/17/2014       Details of Conversation:    Chart reviewed. Patient seen at the bedside, intubated. Patient opens her eyes to her name, unable to follow commands. Patient's spouse, [facility], would like for patient to remain full CODE STATUS and to continue current medical management. He is in agreement with tracheostomy and PEG tube insertion as patient may have a prolonged neurological recovery if at all. Will await plan for tracheostomy and PEG tube insertion if indicated. All questions and concerns addressed. Palliative medicine continue to follow.     OBJECTIVE:   Prognosis: Guarded    Physical Exam:  BP (!) 152/101   Pulse

## 2023-08-25 NOTE — PROGRESS NOTES
Comprehensive Nutrition Assessment    Type and Reason for Visit:  Reassess    Nutrition Recommendations/Plan:     Continue NPO, Modify Tube Feeding- Renal formula not appropriate as electrolytes low; K 3.2 & Phos 1.9    Rec Peptide Based (Vital AF 1.2) @ 50 mll/hr. Will provide: 1200 ml tv, 1440 kcals, 90 gm pro, 973 ml free water  Regimen meets 100% est calorie & protein needs          Malnutrition Assessment:  Malnutrition Status: Moderate malnutrition (08/22/23 1034)    Context:  Chronic Illness     Findings of the 6 clinical characteristics of malnutrition:  Energy Intake:  75% or less estimated energy requirements for 1 month or longer  Weight Loss:  Unable to assess (BAYRON  d/t Hx of CKD/CHF and possible fluid shifts + varied wt hx)     Body Fat Loss:  Mild body fat loss Orbital, Triceps   Muscle Mass Loss:  Mild muscle mass loss Temples (temporalis), Clavicles (pectoralis & deltoids), Scapula (trapezius)  Fluid Accumulation:  No significant fluid accumulation    Strength:  Not Performed    Nutrition Assessment:    Pt remains at risk d/t ongoing intubation & need for EN support. Admit w/ re-current respiratory distress now s/p cardiac arrest in ED (just d/c from hospital 8/18/)- suspected anoxic encephalopathy. Noted Septic shock 2/2 PNA. Noted IRMA & Shock Liver. PMHx COPD,Sarcoidosis/pulmonary fibrosis, CHF, previous SBO, colitis/ colectomy. Pt meets criteria for Moderate Malnutrition. EN support running, will provide updated TF recs based on current medical status & monitor.     Nutrition Related Findings:    Pt intubated, off sedation & pressors, MAP WNL, +I/O's 5L, trace/+1 edema, active BS, abd distention, hernia, OGT w/ TF, hypokalemia/ hypophosphatemia     Wound Type: None       Current Nutrition Intake & Therapies:      Current Tube Feeding (TF) Orders:  Feeding Route: Orogastric  Formula: Renal Formula  Schedule: Continuous  Feeding Regimen: 30 ml/hr x 23 hrs hold for

## 2023-08-25 NOTE — PROGRESS NOTES
[facility] [place] Inpatient   Resident Progress Note    S:  Hospital day: 6    Brief Synopsis: Rob Batista is a 78 y.o. female with a past medical history of pulmonary sarcoidosis, atrial fibrillation on Eliquis, pulmonary hypertension, diabetes insipidus, hypothyroidism, hypertension, chronic kidney disease stage IIIa and combined systolic/diastolic heart failure who presents to the emergency department for shortness of breath. Patient was recently discharged from [facility] (08/18/23) due to acute hypoxic respiratory failure. Patient has had numerous hospital admissions for the same complaint. Hypotensive, tachycardic and hypoxic at presentation. She did decompensate and ended up in PEA. 2 rounds of CPR were performed and ROSC achieved. Patient admitted to the intensive care unit for acute hypoxic respiratory failure requiring intubation and vasopressors. Overnight/Interim  Patient was seen and examined this morning.  Intubated, no longer sedated and not on any pressors  Failed SBT yesterday   Clinically worsening this morning, no longer responding to sternal rub, not opening her eyes, and not following commands   Patient continues to reach for lines and tubing when unrestrained   TFs in place     Cont meds:    sodium chloride      sodium chloride      dexmedetomidine (PRECEDEX) IV infusion Stopped (08/21/23 2035)    [Held by provider] fentaNYL Stopped (08/22/23 0929)    [Held by provider] midazolam Stopped (08/22/23 0746)    dextrose      norepinephrine Stopped (08/22/23 1459)     Scheduled meds:    potassium & sodium phosphates  1 packet Oral TID    potassium phosphate IVPB  30 mmol IntraVENous Once    potassium bicarb-citric acid  40 mEq Oral BID    ceftolozane-tazobactam (ZERBAXA) IVPB  3,000 mg IntraVENous Q8H    levETIRAcetam  500 mg Oral BID    metoprolol tartrate  50 mg Oral BID    lidocaine PF  5 mL IntraDERmal Once    sodium chloride flush  5-40 mL IntraVENous 2 times per day 200 mg daily        PT/OT evaluation: To be consulted when patient leaves ICU  DVT/GI prophylaxis: Eliquis 5 mg twice daily/Protonix 40 mg IV daily  Diet: N.p.o.  Full code  Disposition: ICU   consulted for placement at discharge. Recommend facility due to multiple hospital admissions.       Electronically signed by Delaney Melendez MD on 8/25/2023 at 12:48 PM  This case was discussed with attending physician Dr. Sibley Friends

## 2023-08-25 NOTE — PROGRESS NOTES
Power picc line  Placement 8/25/2023    Product number: [id number]   Lot Number: [id number]      Ultrasound: yes   Left Brachial vein:                Upper Arm Circumference: (CM) 28cm    Size:(FR)/GUAGE 5.5frdl    Exposed Length: (CM) 2cm    Internal Length: (CM) 36cm   Cut: (CM) 17cm   Vein Measurement: 0.57cm              Lidocaine Given: yes1%  Amol Knox RN  8/25/2023  2:43 PM

## 2023-08-26 ENCOUNTER — APPOINTMENT (OUTPATIENT)
Dept: GENERAL RADIOLOGY | Age: 68
DRG: 870 | End: 2023-08-26
Payer: MEDICARE

## 2023-08-26 LAB
AADO2: 237.1 MMHG
ALBUMIN SERPL-MCNC: 3.2 G/DL (ref 3.5–5.2)
ALP SERPL-CCNC: 111 U/L (ref 35–104)
ALT SERPL-CCNC: 96 U/L (ref 0–32)
ANION GAP SERPL CALCULATED.3IONS-SCNC: 11 MMOL/L (ref 7–16)
AST SERPL-CCNC: 53 U/L (ref 0–31)
B.E.: 8.7 MMOL/L (ref -3–3)
BASOPHILS # BLD: 0 K/UL (ref 0–0.2)
BASOPHILS # BLD: 0.01 K/UL (ref 0–0.2)
BASOPHILS # BLD: 0.01 K/UL (ref 0–0.2)
BASOPHILS NFR BLD: 0 % (ref 0–2)
BILIRUB SERPL-MCNC: 0.7 MG/DL (ref 0–1.2)
BUN SERPL-MCNC: 19 MG/DL (ref 6–23)
CALCIUM SERPL-MCNC: 8.2 MG/DL (ref 8.6–10.2)
CHLORIDE SERPL-SCNC: 96 MMOL/L (ref 98–107)
CO2 SERPL-SCNC: 29 MMOL/L (ref 22–29)
COHB: 0.9 % (ref 0–1.5)
CREAT SERPL-MCNC: 0.8 MG/DL (ref 0.5–1)
CRITICAL: ABNORMAL
DATE ANALYZED: ABNORMAL
DATE OF COLLECTION: ABNORMAL
EOSINOPHIL # BLD: 0 K/UL (ref 0.05–0.5)
EOSINOPHIL # BLD: 0.15 K/UL (ref 0.05–0.5)
EOSINOPHIL # BLD: 0.22 K/UL (ref 0.05–0.5)
EOSINOPHILS RELATIVE PERCENT: 0 % (ref 0–6)
EOSINOPHILS RELATIVE PERCENT: 2 % (ref 0–6)
EOSINOPHILS RELATIVE PERCENT: 3 % (ref 0–6)
ERYTHROCYTE [DISTWIDTH] IN BLOOD BY AUTOMATED COUNT: 17.9 % (ref 11.5–15)
ERYTHROCYTE [DISTWIDTH] IN BLOOD BY AUTOMATED COUNT: 18 % (ref 11.5–15)
ERYTHROCYTE [DISTWIDTH] IN BLOOD BY AUTOMATED COUNT: 18.3 % (ref 11.5–15)
FIO2: 50 %
GFR SERPL CREATININE-BSD FRML MDRD: >60 ML/MIN/1.73M2
GLUCOSE SERPL-MCNC: 99 MG/DL (ref 74–99)
HCO3: 32 MMOL/L (ref 22–26)
HCT VFR BLD AUTO: 27.4 % (ref 34–48)
HCT VFR BLD AUTO: 27.8 % (ref 34–48)
HCT VFR BLD AUTO: 28.8 % (ref 34–48)
HGB BLD-MCNC: 8.7 G/DL (ref 11.5–15.5)
HGB BLD-MCNC: 8.7 G/DL (ref 11.5–15.5)
HGB BLD-MCNC: 9.1 G/DL (ref 11.5–15.5)
HHB: 8.1 % (ref 0–5)
IMM GRANULOCYTES # BLD AUTO: 0.1 K/UL (ref 0–0.58)
IMM GRANULOCYTES # BLD AUTO: 0.11 K/UL (ref 0–0.58)
IMM GRANULOCYTES NFR BLD: 1 % (ref 0–5)
IMM GRANULOCYTES NFR BLD: 1 % (ref 0–5)
LAB: ABNORMAL
LYMPHOCYTES NFR BLD: 0.26 K/UL (ref 1.5–4)
LYMPHOCYTES NFR BLD: 0.38 K/UL (ref 1.5–4)
LYMPHOCYTES NFR BLD: 0.57 K/UL (ref 1.5–4)
LYMPHOCYTES RELATIVE PERCENT: 3 % (ref 20–42)
LYMPHOCYTES RELATIVE PERCENT: 4 % (ref 20–42)
LYMPHOCYTES RELATIVE PERCENT: 5 % (ref 20–42)
Lab: ABNORMAL
MCH RBC QN AUTO: 26.1 PG (ref 26–35)
MCH RBC QN AUTO: 26.4 PG (ref 26–35)
MCH RBC QN AUTO: 26.5 PG (ref 26–35)
MCHC RBC AUTO-ENTMCNC: 31.3 G/DL (ref 32–34.5)
MCHC RBC AUTO-ENTMCNC: 31.6 G/DL (ref 32–34.5)
MCHC RBC AUTO-ENTMCNC: 31.8 G/DL (ref 32–34.5)
MCV RBC AUTO: 83.5 FL (ref 80–99.9)
METHB: 0.3 % (ref 0–1.5)
MODE: AC
MONOCYTES NFR BLD: 0.19 K/UL (ref 0.1–0.95)
MONOCYTES NFR BLD: 0.57 K/UL (ref 0.1–0.95)
MONOCYTES NFR BLD: 0.63 K/UL (ref 0.1–0.95)
MONOCYTES NFR BLD: 2 % (ref 2–12)
MONOCYTES NFR BLD: 7 % (ref 2–12)
MONOCYTES NFR BLD: 7 % (ref 2–12)
NEUTROPHILS NFR BLD: 85 % (ref 43–80)
NEUTROPHILS NFR BLD: 88 % (ref 43–80)
NEUTROPHILS NFR BLD: 93 % (ref 43–80)
NEUTS SEG NFR BLD: 10.14 K/UL (ref 1.8–7.3)
NEUTS SEG NFR BLD: 7.36 K/UL (ref 1.8–7.3)
NEUTS SEG NFR BLD: 8.08 K/UL (ref 1.8–7.3)
O2 CONTENT: 12.7 ML/DL
O2 SATURATION: 91.8 % (ref 92–98.5)
O2HB: 90.7 % (ref 94–97)
OPERATOR ID: ABNORMAL
PATIENT TEMP: 37 C
PCO2: 38.5 MMHG (ref 35–45)
PEEP/CPAP: 10 CMH2O
PFO2: 1.27 MMHG/%
PH BLOOD GAS: 7.54 (ref 7.35–7.45)
PLATELET CONFIRMATION: NORMAL
PLATELET CONFIRMATION: NORMAL
PLATELET, FLUORESCENCE: 104 K/UL (ref 130–450)
PLATELET, FLUORESCENCE: 85 K/UL (ref 130–450)
PLATELET, FLUORESCENCE: 94 K/UL (ref 130–450)
PMV BLD AUTO: ABNORMAL FL (ref 7–12)
PO2: 63.6 MMHG (ref 75–100)
POTASSIUM SERPL-SCNC: 4.2 MMOL/L (ref 3.5–5)
PROT SERPL-MCNC: 5.6 G/DL (ref 6.4–8.3)
RBC # BLD AUTO: 3.28 M/UL (ref 3.5–5.5)
RBC # BLD AUTO: 3.33 M/UL (ref 3.5–5.5)
RBC # BLD AUTO: 3.45 M/UL (ref 3.5–5.5)
RBC # BLD: ABNORMAL 10*6/UL
RI(T): 3.73
RR MECHANICAL: 20 B/MIN
SODIUM SERPL-SCNC: 136 MMOL/L (ref 132–146)
SOURCE, BLOOD GAS: ABNORMAL
THB: 9.9 G/DL (ref 11.5–16.5)
TIME ANALYZED: 459
VT MECHANICAL: 400 ML
WBC # BLD: ABNORMAL 10*3/UL
WBC OTHER # BLD: 10.9 K/UL (ref 4.5–11.5)
WBC OTHER # BLD: 8.7 K/UL (ref 4.5–11.5)
WBC OTHER # BLD: 9.2 K/UL (ref 4.5–11.5)

## 2023-08-26 PROCEDURE — 85025 COMPLETE CBC W/AUTO DIFF WBC: CPT

## 2023-08-26 PROCEDURE — 6370000000 HC RX 637 (ALT 250 FOR IP): Performed by: INTERNAL MEDICINE

## 2023-08-26 PROCEDURE — 2000000000 HC ICU R&B

## 2023-08-26 PROCEDURE — 6360000002 HC RX W HCPCS: Performed by: INTERNAL MEDICINE

## 2023-08-26 PROCEDURE — 6360000002 HC RX W HCPCS

## 2023-08-26 PROCEDURE — 80053 COMPREHEN METABOLIC PANEL: CPT

## 2023-08-26 PROCEDURE — 71045 X-RAY EXAM CHEST 1 VIEW: CPT

## 2023-08-26 PROCEDURE — 2580000003 HC RX 258

## 2023-08-26 PROCEDURE — 2580000003 HC RX 258: Performed by: INTERNAL MEDICINE

## 2023-08-26 PROCEDURE — 99291 CRITICAL CARE FIRST HOUR: CPT | Performed by: INTERNAL MEDICINE

## 2023-08-26 PROCEDURE — A4216 STERILE WATER/SALINE, 10 ML: HCPCS

## 2023-08-26 PROCEDURE — 94640 AIRWAY INHALATION TREATMENT: CPT

## 2023-08-26 PROCEDURE — 82805 BLOOD GASES W/O2 SATURATION: CPT

## 2023-08-26 PROCEDURE — C9113 INJ PANTOPRAZOLE SODIUM, VIA: HCPCS

## 2023-08-26 PROCEDURE — 99233 SBSQ HOSP IP/OBS HIGH 50: CPT | Performed by: FAMILY MEDICINE

## 2023-08-26 PROCEDURE — 94003 VENT MGMT INPAT SUBQ DAY: CPT

## 2023-08-26 PROCEDURE — 6370000000 HC RX 637 (ALT 250 FOR IP)

## 2023-08-26 RX ADMIN — SODIUM CHLORIDE, PRESERVATIVE FREE 40 MG: 5 INJECTION INTRAVENOUS at 09:00

## 2023-08-26 RX ADMIN — HYDRALAZINE HYDROCHLORIDE 25 MG: 25 TABLET, FILM COATED ORAL at 09:01

## 2023-08-26 RX ADMIN — BUDESONIDE INHALATION 500 MCG: 0.5 SUSPENSION RESPIRATORY (INHALATION) at 19:57

## 2023-08-26 RX ADMIN — CEFTOLOZANE AND TAZOBACTAM 3000 MG: 1; .5 INJECTION, POWDER, LYOPHILIZED, FOR SOLUTION INTRAVENOUS at 05:09

## 2023-08-26 RX ADMIN — HYDRALAZINE HYDROCHLORIDE 25 MG: 25 TABLET, FILM COATED ORAL at 20:38

## 2023-08-26 RX ADMIN — Medication 10 ML: at 09:39

## 2023-08-26 RX ADMIN — LEVETIRACETAM 500 MG: 100 SOLUTION ORAL at 20:41

## 2023-08-26 RX ADMIN — PREDNISONE 5 MG: 5 TABLET ORAL at 09:01

## 2023-08-26 RX ADMIN — CHLORHEXIDINE GLUCONATE 15 ML: 1.2 RINSE ORAL at 09:00

## 2023-08-26 RX ADMIN — Medication 10 ML: at 09:02

## 2023-08-26 RX ADMIN — AMIODARONE HYDROCHLORIDE 200 MG: 200 TABLET ORAL at 09:01

## 2023-08-26 RX ADMIN — HEPARIN 300 UNITS: 100 SYRINGE at 20:39

## 2023-08-26 RX ADMIN — Medication 10 ML: at 20:39

## 2023-08-26 RX ADMIN — METOPROLOL TARTRATE 50 MG: 50 TABLET, FILM COATED ORAL at 20:38

## 2023-08-26 RX ADMIN — METOPROLOL TARTRATE 50 MG: 50 TABLET, FILM COATED ORAL at 09:01

## 2023-08-26 RX ADMIN — Medication 250 MG: at 15:34

## 2023-08-26 RX ADMIN — ARFORMOTEROL TARTRATE 15 MCG: 15 SOLUTION RESPIRATORY (INHALATION) at 19:57

## 2023-08-26 RX ADMIN — POTASSIUM BICARBONATE 40 MEQ: 782 TABLET, EFFERVESCENT ORAL at 20:38

## 2023-08-26 RX ADMIN — ARFORMOTEROL TARTRATE 15 MCG: 15 SOLUTION RESPIRATORY (INHALATION) at 08:18

## 2023-08-26 RX ADMIN — BUDESONIDE INHALATION 500 MCG: 0.5 SUSPENSION RESPIRATORY (INHALATION) at 08:18

## 2023-08-26 RX ADMIN — CHLORHEXIDINE GLUCONATE 15 ML: 1.2 RINSE ORAL at 20:39

## 2023-08-26 RX ADMIN — CEFTOLOZANE AND TAZOBACTAM 3000 MG: 1; .5 INJECTION, POWDER, LYOPHILIZED, FOR SOLUTION INTRAVENOUS at 12:33

## 2023-08-26 RX ADMIN — Medication 250 MG: at 20:38

## 2023-08-26 RX ADMIN — HEPARIN 300 UNITS: 100 SYRINGE at 09:01

## 2023-08-26 RX ADMIN — CEFTOLOZANE AND TAZOBACTAM 3000 MG: 1; .5 INJECTION, POWDER, LYOPHILIZED, FOR SOLUTION INTRAVENOUS at 20:34

## 2023-08-26 RX ADMIN — LEVOTHYROXINE SODIUM 75 MCG: 0.07 TABLET ORAL at 06:36

## 2023-08-26 RX ADMIN — POTASSIUM BICARBONATE 40 MEQ: 782 TABLET, EFFERVESCENT ORAL at 09:01

## 2023-08-26 RX ADMIN — LEVETIRACETAM 500 MG: 100 SOLUTION ORAL at 09:01

## 2023-08-26 RX ADMIN — POLYETHYLENE GLYCOL 3350 17 GRAM ORAL POWDER PACKET 17 G: at 09:01

## 2023-08-26 RX ADMIN — Medication 250 MG: at 09:01

## 2023-08-26 ASSESSMENT — PULMONARY FUNCTION TESTS
PIF_VALUE: 49
PIF_VALUE: 31
PIF_VALUE: 31
PIF_VALUE: 35
PIF_VALUE: 29
PIF_VALUE: 32
PIF_VALUE: 33
PIF_VALUE: 29
PIF_VALUE: 34
PIF_VALUE: 29
PIF_VALUE: 30
PIF_VALUE: 31
PIF_VALUE: 50
PIF_VALUE: 29
PIF_VALUE: 46
PIF_VALUE: 29
PIF_VALUE: 31
PIF_VALUE: 35
PEFR_L/MIN: 20
PIF_VALUE: 29
PIF_VALUE: 29
PIF_VALUE: 30
PIF_VALUE: 35
PIF_VALUE: 33
PIF_VALUE: 29
PIF_VALUE: 31

## 2023-08-26 ASSESSMENT — PAIN SCALES - GENERAL
PAINLEVEL_OUTOF10: 0

## 2023-08-26 NOTE — PROGRESS NOTES
PEDRO PROGRESS NOTE    Face to face encounter   Chief complaint: Follow-up of ongoing antibiotic treatment for Stenotrophomonas pneumonia    The patient is a 78 y.o. female with history of atrial fibrillation, CHF, COPD, hypertension, C. difficile infection, ischemic colitis s/p right hemicolectomy with ileocecal anastomosis in 2008, pulmonary sarcoidosis, severe pulmonary hypertension, chronic hypoxic respiratory failure on continuous 4Lpm supplemental oxygen, recurrent hospital admissions for shortness of breath and more recently admitted from 08/09-08/18 for acute hypoxic respiratory failure secondary to Stenotrophomonas maltophilia pneumonia for which she was discharged on 7 days of high-dose Bactrim, readmitted on 08/19 with shortness of breath then went into PEA arrest with ROSC after 2 rounds of CPR. On admission, she was afebrile with no leukocytosis. Chest CTA showed left perihilar and left lower lobe pneumonia, emphysematous changes with superimposed interstitial pulmonary fibrosis, cardiomegaly with ectasia of pulmonary trunk and pulmonary arteries, essentially similar to that in July. CT abdomen and pelvis showed markedly distended cecum filled with stools, multilevel degenerative disc disease, L5 and T12 compression fractures. Respiratory pathogen PCR panel, urine Streptococcus pneumoniae and Legionella antigens, MRSA nares culture were negative. Sputum Gram stain and culture showed few polymorphonuclear leukocytes, rare epithelial cells, rare gram-positive cocci, rare gram-positive rods, normal tanesha. Urinalysis showed insignificant pyuria of 0-5 WBC with urine culture showing mixed tanesha. She received a dose of cefepime and tigecycline on admission then was given piperacillin-tazobactam and vancomycin from 08/20 to 08/21 then switched to linezolid and ceftazidime-tazobactam on 08/21.    8/26/2023  Subjective: Patient was seen and examined. She remains in critical condition, intubated.  She

## 2023-08-26 NOTE — PROGRESS NOTES
Notified Dr. Ruam Donald with nephrology regarding the patient's low output. No new orders received.

## 2023-08-26 NOTE — FLOWSHEET NOTE
Patient attempting to pull at lines and tubes when unrestrained       08/26/23 0800   Restraint Type   Non-Violent Restraint Type from Order question Soft Restraint Bilateral Wrist   Soft Restraint Bilateral Wrist CONTINUED   Assessment   Less Restrictive Alternative Repositioning; Re-evaluate equipment;Disguise equipment;Verbal re-direction;Diversional activities; Review medication (side effects); Alarm   Special Consideration/Risk Factors None   Justification from Order   Clinical Justification Pulling lines tubes dressing equipment   Education   Discontinuation Criteria Absence of behavior that required restraint   Criteria Explained Yes   Patient's Response No evidence of learning   Family Notification Already completed   Restraint  Monitoring Q2 Hours   Continued Justification  Continues to meet order criteria   Visual/Safety Check  Patient Asleep/Resting   Circulation No signs of injury   Range of Motion Performed   Fluids NPO   Food/Meal Meal/Enteral feeding   Elimination Yes   Reason Patient did not Eliminate Urinary catheter  (external catheter in place)   Care Plan Interventions   Remains free of injury from restraints (restraint for interference with medical device) Every 2 hours: Monitor safety, psychosocial status, comfort, nutrition and hydration

## 2023-08-26 NOTE — PROGRESS NOTES
[facility] Inpatient   Resident Progress Note    S:  Hospital day: 7    Brief Synopsis: Dianna Gary is a 78 y.o. female with a past medical history of pulmonary sarcoidosis, atrial fibrillation on Eliquis, pulmonary hypertension, diabetes insipidus, hypothyroidism, hypertension, chronic kidney disease stage IIIa and combined systolic/diastolic heart failure who presents to the emergency department for shortness of breath. Patient was recently discharged from [facility] (08/18/23) due to acute hypoxic respiratory failure. Patient has had numerous hospital admissions for the same complaint. Hypotensive, tachycardic and hypoxic at presentation. She did decompensate and ended up in PEA. 2 rounds of CPR were performed and ROSC achieved. Patient admitted to the intensive care unit for acute hypoxic respiratory failure requiring intubation and vasopressors. Overnight/Interim  Patient was seen and examined this morning.  Intubated, no longer sedated and not on any pressors  Failed SBT yesterday   Responding to simple commands this morning, visually tracking and nodding head to questions   TFs in place     Cont meds:    sodium chloride      sodium chloride 10 mL/hr at 08/25/23 2039    dextrose       Scheduled meds:    potassium & sodium phosphates  1 packet Oral TID    potassium bicarb-citric acid  40 mEq Oral BID    ceftolozane-tazobactam (ZERBAXA) IVPB  3,000 mg IntraVENous Q8H    levETIRAcetam  500 mg Oral BID    metoprolol tartrate  50 mg Oral BID    predniSONE  5 mg Oral Daily    lidocaine PF  5 mL IntraDERmal Once    sodium chloride flush  5-40 mL IntraVENous 2 times per day    heparin flush  3 mL IntraVENous 2 times per day    chlorhexidine  15 mL Mouth/Throat BID    amiodarone  200 mg Oral Daily    budesonide  500 mcg Nebulization BID RT    arformoterol tartrate  15 mcg Nebulization BID RT    [Held by provider] desmopressin  200 mcg Oral BID    senna  1 tablet Oral Nightly    polyethylene discharge. Recommend facility due to multiple hospital admissions.       Electronically signed by Kojo Hdez DO on 8/26/2023 at 10:33 AM  This case was discussed with attending physician Dr. Eladia Mcnulty

## 2023-08-26 NOTE — FLOWSHEET NOTE
Patient attempting to pull at lines and tubes when unrestrained       08/26/23 0943   Restraint Order   Length of Order (Only Document With Each New Order) Continuous Until 2359 of Next Calendar Day   Attending Provider Notified (Only Document With Each New Order) Yes   Order Upon Application (Only Document With Each New Order) Yes   NV Length of Order 38.28   Restraint Type   Non-Violent Restraint Type from Order question Soft Restraint Bilateral Wrist   Soft Restraint Bilateral Wrist CONTINUED   Assessment   Less Restrictive Alternative Repositioning; Re-evaluate equipment;Disguise equipment; Re-orientation;Verbal re-direction; Review medication (side effects); Alarm;Pain management   Special Consideration/Risk Factors None   Justification from Order   Clinical Justification Pulling lines tubes dressing equipment   Education   Discontinuation Criteria Absence of behavior that required restraint   Criteria Explained Yes   Patient's Response No evidence of learning   Family Notification Already completed   Restraint  Monitoring Q2 Hours   Continued Justification  Continues to meet order criteria   Visual/Safety Check  Subdued   Circulation No signs of injury   Range of Motion Performed   Fluids NPO   Food/Meal Meal/Enteral feeding   Elimination Yes   Reason Patient did not Eliminate Urinary catheter  (external catheter in place)

## 2023-08-26 NOTE — PLAN OF CARE
Neurology plan of care note  Patient admitted for cardiac arrest.  She had PEA arrest with 2 rounds of CPR before ROSC was achieved. She remains intubated and not sedated. EEG showed moderate encephalopathy, MRI without evidence of acute findings-no findings to suggest anoxic brain injury. MRI brain without evidence of anoxic injury or other acute findings      Neurology was asked to see patient again for weakness. Upon exam patient is able to grasp with bilateral hands and wiggle toes on her bilateral feet. She is able to stick her tongue out and track examiner. She is able to plantar flexion and dorsiflexion. Her strength is equal in all of her extremities.     Plan  Continue to treat medical issues as able  Continue to wean ventilator  Supportive care  Neurology will sign off call if needed

## 2023-08-26 NOTE — PROGRESS NOTES
DAILY VENTILATOR WEANING ASSESSMENT PERFORMED    P/FIO2 Ratio =      127    (<100= do not Wean)                  Cs =                       36   (<32= Instability)  Plat. Pressure =22  MV =7.99  RSBI =48    Instabilities:       Cardiovascular =       CNS =       Respiratory =1        Metabolic =    Parameters    {yes no:285773}    Wean per protocol  {yes ZE:069786}    Ask Physician for a weaning plan {yes AY:431586}    Additional Comments:     Performed by Vidhya Hernandez RRT      Reference Table:    Cardiovascular     CNS      1. Mean BP less than or equal to 75   1. Neuromuscular blockade  2. Heart Rate greater than 130   2. RASS of -3, -4, -5  3. Myocardial Ischemia    3. RASS of +3, +4  4. Mechanical Assist Device    4. ICP greater than 15 or             Intracranial Hypertension         Respiratory      Metabolic  1. PEEP equal to or greater than 10cm/H20  1. Temp. (8hrs) less than 95 or > 103  2. Respiratory Rate greater than 35   2. WBC < 5000 or > 12326  3. Minute Volume greater than 15L  4. pH less than 7.30  5.  Deteriorating chest X-ray

## 2023-08-26 NOTE — FLOWSHEET NOTE
Patient attempting to pull at lines and tubes when unrestrained       08/26/23 1600   Restraint Type   Non-Violent Restraint Type from Order question Soft Restraint Bilateral Wrist   Soft Restraint Bilateral Wrist CONTINUED   Assessment   Less Restrictive Alternative Repositioning;Disguise equipment; Re-evaluate equipment; Re-orientation;Verbal re-direction;Diversional activities; Review medication (side effects); Pain management; Alarm   Special Consideration/Risk Factors None   Justification from Order   Clinical Justification Pulling lines tubes dressing equipment   Education   Discontinuation Criteria Absence of behavior that required restraint   Criteria Explained Yes   Patient's Response No evidence of learning   Family Notification Already completed   Restraint  Monitoring Q2 Hours   Continued Justification  Continues to meet order criteria   Visual/Safety Check  Subdued   Circulation No signs of injury   Range of Motion Performed   Fluids NPO   Food/Meal Meal/Enteral feeding   Elimination Yes   Reason Patient did not Eliminate Urinary catheter  (external catheter in place)

## 2023-08-26 NOTE — PROGRESS NOTES
[address] [facility]             Pulmonary & Critical Care Medicine                MICU Progress Note                 Written by: Gladys Vaughan MD  Name: Felix Pang : 1955       Age: 78 y.o. MR/Act #    : [id number],  Billing  #    : [id number]   Admit Date: 2023  2:21 AM LOS: 7,   Hospital Day: 8 Room #      : [id number]   PCP            : Wu An MD,   Referred by: Jaspreet Martins MD ICU Attending: MD Fredrick Dobbs date: 2023 10:36 AM   ICU Days:       6 Vent Days:    6 LOS: 7,                          Reason for ICU admission           Chief Complaint   Patient presents with    Shortness of Breath     (Started earlier last night, does wear home O2 on 6L)                          Brief HPI, Presentation & Synopsis                       79-year-old female with past medical history of acute on chronic respiratory failure, Pseudomonas pneumonia, stenotrophomonas pneumonia, pulmonary hypertension, pulmonary sarcoidosis IRMA on top of CKD, hyponatremia, anemia of chronic disease presented to the ER where she had PEA with cardiac arrest.  2 rounds of CPR and ROSC was achieved. She was intubated during the cardiac arrest.  Hypotension was noted and started on Levophed drip. CTA chest showed left-sided pneumonia. Admitted in ICU for septic shock, cardiac arrest and respiratory failure due to pneumonia.     Active problem list of yesterday:  Active Hospital Problems    Diagnosis Date Noted    Moderate protein-calorie malnutrition ([address]) [E44.0] 2023    Palliative care encounter [Z51.5] 2023    Cardiac arrest ([address]) [I46.9] 2023    Shortness of breath [R06.02] 2023    Altered mental status [R41.82] 2014                           Events of last night                      Continues to be ventilator dependent                      Subjective   2023               Continues to be Levophed dependent to silhouettes the diaphragm and heart border and short-term follow-up is suggested     XR CHEST PORTABLE    Result Date: 8/20/2023  Progressing pulmonary edema. Interval clearing of left retrocardiac opacity. XR CHEST PORTABLE    Result Date: 8/20/2023  Central pulmonary artery hypertension. Left retrocardiac atelectasis or pneumonic consolidation. XR CHEST PORTABLE    Result Date: 8/19/2023  1. Increased interstitial prominence within the lungs concerning for developing pulmonary edema or fluid overload 2. Increased bibasilar linear opacities suggest worsening atelectasis. 3. The position and alignment of the tubes and catheters appear within the normal range     XR CHEST PORTABLE    Result Date: 8/19/2023  Cardiomediastinal silhouette appears enlarged. Prominent hilar/perihilar structures probably reflecting prominent pulmonary vessels however cannot entirely exclude nodule/mass. Would advise a follow-up PA chest radiograph. Mild bibasilar atelectasis or infiltrate. No gross pneumothorax. Deformity of the right humeral head. CTA CHEST W CONTRAST    Result Date: 8/19/2023  1. Left perihilar and left lower lobe pneumonia. 2. Emphysematous changes with superimposed interstitial pulmonary fibrosis 3. Cardiomegaly with ectasia of the pulmonary trunk and pulmonary arteries which can be seen with pulmonary hypertension 4. Contrast is seen within the right atrium and extends into the inferior vena cava. These findings can be seen with right heart failure. 5. Satisfactory position of the endotracheal tube 6. Advanced degenerative changes of the spine. 7. There is no pulmonary embolus. XR ABDOMEN FOR NG/OG/NE TUBE PLACEMENT    Result Date: 8/20/2023  Nasogastric tube identified tip in the stomach. Bowel gas pattern is nonspecific. XR ABDOMEN FOR NG/OG/NE TUBE PLACEMENT    Result Date: 8/19/2023  Satisfactory position of NG tube.      XR ABDOMEN FOR NG/OG/NE TUBE PLACEMENT    Result Date: procedures.       Electronically signed by Adia Rizo MD on 8/26/2023 at 10:36 AM  [address]

## 2023-08-27 LAB
1,3 BETA GLUCAN SER-MCNC: ABNORMAL PG/ML
1,3 BETA-D-GLUCAN INTERP: ABNORMAL
AADO2: 230 MMHG
ALBUMIN SERPL-MCNC: 3.3 G/DL (ref 3.5–5.2)
ALP SERPL-CCNC: 101 U/L (ref 35–104)
ALT SERPL-CCNC: 71 U/L (ref 0–32)
ANION GAP SERPL CALCULATED.3IONS-SCNC: 8 MMOL/L (ref 7–16)
ASPERGILLUS GALACTO AG: NEGATIVE
AST SERPL-CCNC: 38 U/L (ref 0–31)
B.E.: 5.3 MMOL/L (ref -3–3)
BASOPHILS # BLD: 0.01 K/UL (ref 0–0.2)
BASOPHILS NFR BLD: 0 % (ref 0–2)
BILIRUB SERPL-MCNC: 0.5 MG/DL (ref 0–1.2)
BUN SERPL-MCNC: 20 MG/DL (ref 6–23)
CALCIUM SERPL-MCNC: 7.9 MG/DL (ref 8.6–10.2)
CHLORIDE SERPL-SCNC: 102 MMOL/L (ref 98–107)
CO2 SERPL-SCNC: 29 MMOL/L (ref 22–29)
COHB: 0.9 % (ref 0–1.5)
CREAT SERPL-MCNC: 0.9 MG/DL (ref 0.5–1)
CRITICAL: ABNORMAL
DATE ANALYZED: ABNORMAL
DATE OF COLLECTION: ABNORMAL
EOSINOPHIL # BLD: 0.22 K/UL (ref 0.05–0.5)
EOSINOPHILS RELATIVE PERCENT: 3 % (ref 0–6)
ERYTHROCYTE [DISTWIDTH] IN BLOOD BY AUTOMATED COUNT: 18.2 % (ref 11.5–15)
FIO2: 50 %
GALACTOMANNAN AG SPEC IA-ACNC: 0.08
GFR SERPL CREATININE-BSD FRML MDRD: >60 ML/MIN/1.73M2
GLUCOSE SERPL-MCNC: 94 MG/DL (ref 74–99)
HCO3: 28.5 MMOL/L (ref 22–26)
HCT VFR BLD AUTO: 27.1 % (ref 34–48)
HGB BLD-MCNC: 8.5 G/DL (ref 11.5–15.5)
HHB: 5.7 % (ref 0–5)
IMM GRANULOCYTES # BLD AUTO: 0.11 K/UL (ref 0–0.58)
IMM GRANULOCYTES NFR BLD: 1 % (ref 0–5)
INR PPP: 1.3
LAB: ABNORMAL
LYMPHOCYTES NFR BLD: 0.41 K/UL (ref 1.5–4)
LYMPHOCYTES RELATIVE PERCENT: 5 % (ref 20–42)
Lab: ABNORMAL
MAGNESIUM SERPL-MCNC: 2 MG/DL (ref 1.6–2.6)
MCH RBC QN AUTO: 26.4 PG (ref 26–35)
MCHC RBC AUTO-ENTMCNC: 31.4 G/DL (ref 32–34.5)
MCV RBC AUTO: 84.2 FL (ref 80–99.9)
METHB: 0.3 % (ref 0–1.5)
MODE: AC
MONOCYTES NFR BLD: 0.76 K/UL (ref 0.1–0.95)
MONOCYTES NFR BLD: 10 % (ref 2–12)
NEUTROPHILS NFR BLD: 81 % (ref 43–80)
NEUTS SEG NFR BLD: 6.29 K/UL (ref 1.8–7.3)
O2 CONTENT: 12.9 ML/DL
O2 SATURATION: 94.2 % (ref 92–98.5)
O2HB: 93.1 % (ref 94–97)
OPERATOR ID: ABNORMAL
PARTIAL THROMBOPLASTIN TIME: 27.3 SEC (ref 24.5–35.1)
PATIENT TEMP: 37 C
PCO2: 36.1 MMHG (ref 35–45)
PEEP/CPAP: 10 CMH2O
PFO2: 1.47 MMHG/%
PH BLOOD GAS: 7.51 (ref 7.35–7.45)
PHOSPHATE SERPL-MCNC: 2.6 MG/DL (ref 2.5–4.5)
PLATELET CONFIRMATION: NORMAL
PLATELET, FLUORESCENCE: 92 K/UL (ref 130–450)
PMV BLD AUTO: ABNORMAL FL (ref 7–12)
PO2: 73.4 MMHG (ref 75–100)
POTASSIUM SERPL-SCNC: 4.1 MMOL/L (ref 3.5–5)
PROT SERPL-MCNC: 5.6 G/DL (ref 6.4–8.3)
PROTHROMBIN TIME: 14 SEC (ref 9.3–12.4)
RBC # BLD AUTO: 3.22 M/UL (ref 3.5–5.5)
RBC # BLD: ABNORMAL 10*6/UL
RI(T): 3.13
RR MECHANICAL: 20 B/MIN
SODIUM SERPL-SCNC: 139 MMOL/L (ref 132–146)
SOURCE, BLOOD GAS: ABNORMAL
THB: 9.8 G/DL (ref 11.5–16.5)
TIME ANALYZED: 513
VT MECHANICAL: 400 ML
WBC OTHER # BLD: 7.8 K/UL (ref 4.5–11.5)

## 2023-08-27 PROCEDURE — 85025 COMPLETE CBC W/AUTO DIFF WBC: CPT

## 2023-08-27 PROCEDURE — 6360000002 HC RX W HCPCS

## 2023-08-27 PROCEDURE — 2580000003 HC RX 258: Performed by: INTERNAL MEDICINE

## 2023-08-27 PROCEDURE — C9113 INJ PANTOPRAZOLE SODIUM, VIA: HCPCS

## 2023-08-27 PROCEDURE — 99233 SBSQ HOSP IP/OBS HIGH 50: CPT | Performed by: FAMILY MEDICINE

## 2023-08-27 PROCEDURE — 85610 PROTHROMBIN TIME: CPT

## 2023-08-27 PROCEDURE — 2580000003 HC RX 258

## 2023-08-27 PROCEDURE — A4216 STERILE WATER/SALINE, 10 ML: HCPCS

## 2023-08-27 PROCEDURE — 80053 COMPREHEN METABOLIC PANEL: CPT

## 2023-08-27 PROCEDURE — 84100 ASSAY OF PHOSPHORUS: CPT

## 2023-08-27 PROCEDURE — 2000000000 HC ICU R&B

## 2023-08-27 PROCEDURE — 94640 AIRWAY INHALATION TREATMENT: CPT

## 2023-08-27 PROCEDURE — 82805 BLOOD GASES W/O2 SATURATION: CPT

## 2023-08-27 PROCEDURE — 99291 CRITICAL CARE FIRST HOUR: CPT | Performed by: INTERNAL MEDICINE

## 2023-08-27 PROCEDURE — 83735 ASSAY OF MAGNESIUM: CPT

## 2023-08-27 PROCEDURE — 6360000002 HC RX W HCPCS: Performed by: INTERNAL MEDICINE

## 2023-08-27 PROCEDURE — 6370000000 HC RX 637 (ALT 250 FOR IP): Performed by: INTERNAL MEDICINE

## 2023-08-27 PROCEDURE — 94003 VENT MGMT INPAT SUBQ DAY: CPT

## 2023-08-27 PROCEDURE — 85730 THROMBOPLASTIN TIME PARTIAL: CPT

## 2023-08-27 PROCEDURE — 6370000000 HC RX 637 (ALT 250 FOR IP)

## 2023-08-27 RX ADMIN — METOPROLOL TARTRATE 50 MG: 50 TABLET, FILM COATED ORAL at 09:11

## 2023-08-27 RX ADMIN — BUDESONIDE INHALATION 500 MCG: 0.5 SUSPENSION RESPIRATORY (INHALATION) at 20:11

## 2023-08-27 RX ADMIN — AMIODARONE HYDROCHLORIDE 200 MG: 200 TABLET ORAL at 09:08

## 2023-08-27 RX ADMIN — BUDESONIDE INHALATION 500 MCG: 0.5 SUSPENSION RESPIRATORY (INHALATION) at 08:18

## 2023-08-27 RX ADMIN — Medication 250 MG: at 18:05

## 2023-08-27 RX ADMIN — LEVETIRACETAM 500 MG: 100 SOLUTION ORAL at 09:11

## 2023-08-27 RX ADMIN — Medication 10 ML: at 09:14

## 2023-08-27 RX ADMIN — Medication 10 ML: at 20:22

## 2023-08-27 RX ADMIN — Medication 250 MG: at 09:24

## 2023-08-27 RX ADMIN — LEVOTHYROXINE SODIUM 75 MCG: 0.07 TABLET ORAL at 06:15

## 2023-08-27 RX ADMIN — LEVETIRACETAM 500 MG: 100 SOLUTION ORAL at 20:22

## 2023-08-27 RX ADMIN — CEFTOLOZANE AND TAZOBACTAM 3000 MG: 1; .5 INJECTION, POWDER, LYOPHILIZED, FOR SOLUTION INTRAVENOUS at 04:55

## 2023-08-27 RX ADMIN — HYDRALAZINE HYDROCHLORIDE 25 MG: 25 TABLET, FILM COATED ORAL at 20:22

## 2023-08-27 RX ADMIN — CHLORHEXIDINE GLUCONATE 15 ML: 1.2 RINSE ORAL at 20:22

## 2023-08-27 RX ADMIN — SODIUM CHLORIDE, PRESERVATIVE FREE 40 MG: 5 INJECTION INTRAVENOUS at 09:11

## 2023-08-27 RX ADMIN — CEFTOLOZANE AND TAZOBACTAM 3000 MG: 1; .5 INJECTION, POWDER, LYOPHILIZED, FOR SOLUTION INTRAVENOUS at 20:08

## 2023-08-27 RX ADMIN — CHLORHEXIDINE GLUCONATE 15 ML: 1.2 RINSE ORAL at 09:08

## 2023-08-27 RX ADMIN — ARFORMOTEROL TARTRATE 15 MCG: 15 SOLUTION RESPIRATORY (INHALATION) at 08:18

## 2023-08-27 RX ADMIN — HEPARIN 300 UNITS: 100 SYRINGE at 09:09

## 2023-08-27 RX ADMIN — HEPARIN 300 UNITS: 100 SYRINGE at 20:22

## 2023-08-27 RX ADMIN — ARFORMOTEROL TARTRATE 15 MCG: 15 SOLUTION RESPIRATORY (INHALATION) at 20:11

## 2023-08-27 RX ADMIN — METOPROLOL TARTRATE 50 MG: 50 TABLET, FILM COATED ORAL at 20:22

## 2023-08-27 RX ADMIN — PREDNISONE 5 MG: 5 TABLET ORAL at 09:14

## 2023-08-27 RX ADMIN — HYDRALAZINE HYDROCHLORIDE 25 MG: 25 TABLET, FILM COATED ORAL at 09:11

## 2023-08-27 RX ADMIN — CEFTOLOZANE AND TAZOBACTAM 3000 MG: 1; .5 INJECTION, POWDER, LYOPHILIZED, FOR SOLUTION INTRAVENOUS at 12:03

## 2023-08-27 ASSESSMENT — PULMONARY FUNCTION TESTS
PIF_VALUE: 28
PIF_VALUE: 32
PIF_VALUE: 32
PIF_VALUE: 30
PIF_VALUE: 31
PIF_VALUE: 33
PIF_VALUE: 31
PIF_VALUE: 35
PIF_VALUE: 26
PIF_VALUE: 19
PIF_VALUE: 37
PIF_VALUE: 32
PIF_VALUE: 29
PIF_VALUE: 20
PIF_VALUE: 30
PIF_VALUE: 33
PIF_VALUE: 30
PIF_VALUE: 28
PIF_VALUE: 31
PIF_VALUE: 35
PIF_VALUE: 48
PIF_VALUE: 45
PIF_VALUE: 20
PIF_VALUE: 40
PIF_VALUE: 32
PIF_VALUE: 19
PIF_VALUE: 19
PIF_VALUE: 37
PIF_VALUE: 29
PIF_VALUE: 19
PIF_VALUE: 29
PIF_VALUE: 19
PIF_VALUE: 19

## 2023-08-27 ASSESSMENT — PAIN SCALES - GENERAL
PAINLEVEL_OUTOF10: 0

## 2023-08-27 NOTE — PLAN OF CARE
Problem: Respiratory - Adult  Goal: Achieves optimal ventilation and oxygenation  8/27/2023 1014 by Dyane Epley, RCP  Outcome: Progressing  Patient is on pressure support today.

## 2023-08-27 NOTE — PLAN OF CARE
Problem: Chronic Conditions and Co-morbidities  Goal: Patient's chronic conditions and co-morbidity symptoms are monitored and maintained or improved  8/27/2023 0824 by Wayne De La Cruz RN  Outcome: Progressing     Problem: Safety - Adult  Goal: Free from fall injury  8/27/2023 0824 by Wayne De La Cruz RN  Outcome: Progressing     Problem: Pain  Goal: Verbalizes/displays adequate comfort level or baseline comfort level  8/27/2023 0824 by Wayne De La Cruz RN  Outcome: Progressing     Problem: Skin/Tissue Integrity  Goal: Absence of new skin breakdown  Description: 1. Monitor for areas of redness and/or skin breakdown  2. Assess vascular access sites hourly  3. Every 4-6 hours minimum:  Change oxygen saturation probe site  4. Every 4-6 hours:  If on nasal continuous positive airway pressure, respiratory therapy assess nares and determine need for appliance change or resting period. 8/27/2023 0824 by Wayne De La Cruz RN  Outcome: Progressing     Problem: ABCDS Injury Assessment  Goal: Absence of physical injury  8/27/2023 0824 by Wayne De La Cruz RN  Outcome: Progressing     Problem: Neurosensory - Adult  Goal: Absence of seizures  8/27/2023 0824 by Wayne De La Cruz RN  Outcome: Progressing     Problem: Respiratory - Adult  Goal: Achieves optimal ventilation and oxygenation  8/27/2023 0824 by Wayne De La Cruz RN  Outcome: Progressing     Problem: Skin/Tissue Integrity - Adult  Goal: Skin integrity remains intact  8/27/2023 0824 by Wayne De La Cruz RN  Outcome: Progressing     Problem: Safety - Medical Restraint  Goal: Remains free of injury from restraints (Restraint for Interference with Medical Device)  Description: INTERVENTIONS:  1. Determine that other, less restrictive measures have been tried or would not be effective before applying the restraint  2. Evaluate the patient's condition at the time of restraint application  3. Inform patient/family regarding the reason for restraint  4.  Q2H: Monitor safety, less restrictive measures have been tried or would not be effective before applying the restraint     Problem: Nutrition Deficit:  Goal: Optimize nutritional status  8/27/2023 0824 by Digna Marvin RN  Outcome: Progressing   Plan of care discussed with patient / family.

## 2023-08-27 NOTE — PROGRESS NOTES
[facility] Inpatient   Resident Progress Note    S:  Hospital day: 8    Brief Synopsis: Cathyann Lanes is a 78 y.o. female with a past medical history of pulmonary sarcoidosis, atrial fibrillation on Eliquis, pulmonary hypertension, diabetes insipidus, hypothyroidism, hypertension, chronic kidney disease stage IIIa and combined systolic/diastolic heart failure who presents to the emergency department for shortness of breath. Patient was recently discharged from [facility] (08/18/23) due to acute hypoxic respiratory failure. Patient has had numerous hospital admissions for the same complaint. Hypotensive, tachycardic and hypoxic at presentation. She did decompensate and ended up in PEA. 2 rounds of CPR were performed and ROSC achieved. Patient admitted to the intensive care unit for acute hypoxic respiratory failure requiring intubation and vasopressors. Overnight/Interim  Patient was seen and examined this morning.  Intubated, no longer sedated and not on any pressors  Failed SBT yesterday   Responding to simple commands this morning, visually tracking and nodding head to questions   TFs in place     Cont meds:    sodium chloride      sodium chloride 10 mL/hr at 08/27/23 0638    dextrose       Scheduled meds:    ceftolozane-tazobactam (ZERBAXA) IVPB  3,000 mg IntraVENous Q8H    levETIRAcetam  500 mg Oral BID    metoprolol tartrate  50 mg Oral BID    predniSONE  5 mg Oral Daily    lidocaine PF  5 mL IntraDERmal Once    sodium chloride flush  5-40 mL IntraVENous 2 times per day    heparin flush  3 mL IntraVENous 2 times per day    chlorhexidine  15 mL Mouth/Throat BID    amiodarone  200 mg Oral Daily    budesonide  500 mcg Nebulization BID RT    arformoterol tartrate  15 mcg Nebulization BID RT    [Held by provider] desmopressin  200 mcg Oral BID    senna  1 tablet Oral Nightly    polyethylene glycol  17 g Oral Daily    hydrALAZINE  25 mg Oral BID    levothyroxine  75 mcg Oral Daily    sodium 8399)  WBC/Hgb/Hct/Plts:  7.8/8.5/27.1/-- (08/27 0449)  estimated creatinine clearance is 58 mL/min (based on SCr of 0.9 mg/dL). Other pertinent labs as noted below    New Imaging:  XR CHEST PORTABLE   Final Result   1. The tip of the endotracheal tube is low lying   2. Increased left basilar density concerning for worsening effusion and or   atelectasis   3. Interstitial prominence concerning for mild pulmonary edema or fluid   overload, unchanged         XR ABDOMEN FOR NG/OG/NE TUBE PLACEMENT   Final Result   Satisfactory position of nasogastric tube. XR CHEST PORTABLE   Final Result   Stable appearance of the chest compared to 08/24/2023. XR CHEST PORTABLE   Final Result   No significant change         MRI BRAIN WO CONTRAST   Final Result   1. No acute intracranial abnormality. 2. No characteristic findings of anoxic brain injury are currently identified. 3. Moderate chronic microvascular ischemic changes are noted, the extent of   which is somewhat greater than is typical for age. 4. Bilateral mastoid effusions, which may be due to eustachian tube   dysfunction or otomastoiditis. XR CHEST PORTABLE   Final Result   Stable appearance of the chest compared to 08/22/2023. XR CHEST PORTABLE   Final Result   Stable appearance of the chest compared to 08/21/2023. CT ABDOMEN PELVIS W IV CONTRAST Additional Contrast? Rectal   Final Result   1. No definite mechanical obstruction. XR CHEST PORTABLE   Final Result   1. Improved bilateral areas of consolidation and atelectasis   2. Decreased pleural effusions   3. Residual consolidative process at the left lung base that continues to   silhouettes the diaphragm and heart border and short-term follow-up is   suggested         XR CHEST PORTABLE   Final Result   Progressing pulmonary edema. Interval clearing of left retrocardiac opacity. XR CHEST PORTABLE   Final Result   Central pulmonary artery hypertension.

## 2023-08-27 NOTE — PROGRESS NOTES
DAILY VENTILATOR WEANING ASSESSMENT PERFORMED    P/FIO2 Ratio = 147       (<100= do not Wean)                  Cs = 27                         (<32= Instability)  Plat. Pressure = 24  MV = 8.94    Instabilities:       Cardiovascular = N/A       CNS = N/A       Respiratory = N/A       Metabolic = N/A    Parameters    yes    Wean per protocol  yes    Ask Physician for a weaning plan yes    Additional Comments: Patient awake and alert. Currently on a PSV +8 per Dr. Ruth Hooks. Performed by Adele Olivera RCP RRT      Reference Table:    Cardiovascular     CNS      1. Mean BP less than or equal to 75   1. Neuromuscular blockade  2. Heart Rate greater than 130   2. RASS of -3, -4, -5  3. Myocardial Ischemia    3. RASS of +3, +4  4. Mechanical Assist Device    4. ICP greater than 15 or             Intracranial Hypertension         Respiratory      Metabolic  1. PEEP equal to or greater than 10cm/H20  1. Temp. (8hrs) less than 95 or > 103  2. Respiratory Rate greater than 35   2. WBC < 5000 or > 92297  3. Minute Volume greater than 15L  4. pH less than 7.30  5. Deteriorating chest X-ray       08/27/23 0813   Patient Observation   Pulse 79  (Simultaneous filing. User may not have seen previous data.)   Respirations 18  (Simultaneous filing. User may not have seen previous data.)   SpO2 96 %  (Simultaneous filing. User may not have seen previous data.)   Observations red outlet, red cord in wall alarm   Breath Sounds   Right Upper Lobe Rhonchi   Right Middle Lobe Diminished   Right Lower Lobe Diminished   Left Upper Lobe Rhonchi   Left Lower Lobe Diminished   Vent Information   Vent Mode AC/PRVC   Ventilator Settings   FiO2  (S)  45 %   Insp Time (sec) 0.7 sec   Vt (Set, mL) 400 mL  (Simultaneous filing. User may not have seen previous data.)   Resp Rate (Set) 20 bmp  (Simultaneous filing. User may not have seen previous data.)   PEEP/CPAP (cmH2O) 10  (Simultaneous filing.  User may not have seen previous data.)   Pressure

## 2023-08-27 NOTE — PLAN OF CARE
Problem: Chronic Conditions and Co-morbidities  Goal: Patient's chronic conditions and co-morbidity symptoms are monitored and maintained or improved  8/27/2023 1816 by Nora Mccoy RN  Outcome: Progressing     Problem: Discharge Planning  Goal: Discharge to home or other facility with appropriate resources  Outcome: Progressing     Problem: Safety - Adult  Goal: Free from fall injury  8/27/2023 1816 by Nora Mccoy RN  Outcome: Progressing     Problem: Pain  Goal: Verbalizes/displays adequate comfort level or baseline comfort level  8/27/2023 1816 by Nora Mccoy RN  Outcome: Progressing     Problem: Skin/Tissue Integrity  Goal: Absence of new skin breakdown  Description: 1. Monitor for areas of redness and/or skin breakdown  2. Assess vascular access sites hourly  3. Every 4-6 hours minimum:  Change oxygen saturation probe site  4. Every 4-6 hours:  If on nasal continuous positive airway pressure, respiratory therapy assess nares and determine need for appliance change or resting period. 8/27/2023 1816 by Nora Mccoy RN  Outcome: Progressing     Problem: ABCDS Injury Assessment  Goal: Absence of physical injury  8/27/2023 1816 by Nora Mccoy RN  Outcome: Progressing     Problem: Neurosensory - Adult  Goal: Achieves stable or improved neurological status  Outcome: Progressing     Problem: Neurosensory - Adult  Goal: Absence of seizures  8/27/2023 1816 by Nora Mccoy RN  Outcome: Progressing     Problem: Neurosensory - Adult  Goal: Remains free of injury related to seizures activity  Outcome: Progressing     Problem: Safety - Medical Restraint  Goal: Remains free of injury from restraints (Restraint for Interference with Medical Device)  Description: INTERVENTIONS:  1. Determine that other, less restrictive measures have been tried or would not be effective before applying the restraint  2. Evaluate the patient's condition at the time of restraint application  3.  Inform

## 2023-08-27 NOTE — PLAN OF CARE
Problem: Chronic Conditions and Co-morbidities  Goal: Patient's chronic conditions and co-morbidity symptoms are monitored and maintained or improved  Outcome: Progressing  Flowsheets  Taken 8/26/2023 2000 by Rudy Ruiz - Patient's Chronic Conditions and Co-Morbidity Symptoms are Monitored and Maintained or Improved:   Monitor and assess patient's chronic conditions and comorbid symptoms for stability, deterioration, or improvement   Collaborate with multidisciplinary team to address chronic and comorbid conditions and prevent exacerbation or deterioration   Update acute care plan with appropriate goals if chronic or comorbid symptoms are exacerbated and prevent overall improvement and discharge  Taken 8/26/2023 0800 by Rudy Silva - Patient's Chronic Conditions and Co-Morbidity Symptoms are Monitored and Maintained or Improved:   Monitor and assess patient's chronic conditions and comorbid symptoms for stability, deterioration, or improvement   Collaborate with multidisciplinary team to address chronic and comorbid conditions and prevent exacerbation or deterioration     Problem: Discharge Planning  Goal: Discharge to home or other facility with appropriate resources  Outcome: Progressing  Flowsheets  Taken 8/26/2023 2000 by Ryder Kwok RN  Discharge to home or other facility with appropriate resources:   Identify barriers to discharge with patient and caregiver   Arrange for needed discharge resources and transportation as appropriate   Identify discharge learning needs (meds, wound care, etc)  Taken 8/26/2023 0800 by Opal Kennedy RN  Discharge to home or other facility with appropriate resources:   Identify barriers to discharge with patient and caregiver   Arrange for needed discharge resources and transportation as appropriate   Identify discharge learning needs (meds, wound care, etc)   Arrange for interpreters to assist at discharge as needed     Problem: Safety -

## 2023-08-28 PROBLEM — Z51.5 PALLIATIVE CARE BY SPECIALIST: Status: ACTIVE | Noted: 2023-08-28

## 2023-08-28 LAB
AADO2: 164.5 MMHG
ALBUMIN SERPL-MCNC: 3.2 G/DL (ref 3.5–5.2)
ALP SERPL-CCNC: 103 U/L (ref 35–104)
ALT SERPL-CCNC: 60 U/L (ref 0–32)
ANION GAP SERPL CALCULATED.3IONS-SCNC: 10 MMOL/L (ref 7–16)
ANION GAP SERPL CALCULATED.3IONS-SCNC: 13 MMOL/L (ref 7–16)
AST SERPL-CCNC: 30 U/L (ref 0–31)
B.E.: 0.9 MMOL/L (ref -3–3)
BASOPHILS # BLD: 0 K/UL (ref 0–0.2)
BASOPHILS NFR BLD: 0 % (ref 0–2)
BILIRUB SERPL-MCNC: 0.4 MG/DL (ref 0–1.2)
BUN SERPL-MCNC: 20 MG/DL (ref 6–23)
BUN SERPL-MCNC: 20 MG/DL (ref 6–23)
CALCIUM SERPL-MCNC: 8.1 MG/DL (ref 8.6–10.2)
CALCIUM SERPL-MCNC: 8.6 MG/DL (ref 8.6–10.2)
CHLORIDE SERPL-SCNC: 102 MMOL/L (ref 98–107)
CHLORIDE SERPL-SCNC: 103 MMOL/L (ref 98–107)
CO2 SERPL-SCNC: 26 MMOL/L (ref 22–29)
CO2 SERPL-SCNC: 28 MMOL/L (ref 22–29)
COHB: 1.1 % (ref 0–1.5)
CREAT SERPL-MCNC: 0.8 MG/DL (ref 0.5–1)
CREAT SERPL-MCNC: 0.9 MG/DL (ref 0.5–1)
CRITICAL: ABNORMAL
DATE ANALYZED: ABNORMAL
DATE OF COLLECTION: ABNORMAL
EOSINOPHIL # BLD: 0.14 K/UL (ref 0.05–0.5)
EOSINOPHILS RELATIVE PERCENT: 2 % (ref 0–6)
ERYTHROCYTE [DISTWIDTH] IN BLOOD BY AUTOMATED COUNT: 18.2 % (ref 11.5–15)
FIO2: 40 %
GFR SERPL CREATININE-BSD FRML MDRD: >60 ML/MIN/1.73M2
GFR SERPL CREATININE-BSD FRML MDRD: >60 ML/MIN/1.73M2
GLUCOSE BLD-MCNC: 95 MG/DL (ref 74–99)
GLUCOSE SERPL-MCNC: 107 MG/DL (ref 74–99)
GLUCOSE SERPL-MCNC: 84 MG/DL (ref 74–99)
HCO3: 23.5 MMOL/L (ref 22–26)
HCT VFR BLD AUTO: 26.8 % (ref 34–48)
HGB BLD-MCNC: 8.3 G/DL (ref 11.5–15.5)
HHB: 4.8 % (ref 0–5)
INR PPP: 1.2
LAB: ABNORMAL
LYMPHOCYTES NFR BLD: 0.28 K/UL (ref 1.5–4)
LYMPHOCYTES RELATIVE PERCENT: 4 % (ref 20–42)
Lab: ABNORMAL
MAGNESIUM SERPL-MCNC: 2.1 MG/DL (ref 1.6–2.6)
MCH RBC QN AUTO: 26.1 PG (ref 26–35)
MCHC RBC AUTO-ENTMCNC: 31 G/DL (ref 32–34.5)
MCV RBC AUTO: 84.3 FL (ref 80–99.9)
METHB: 0.2 % (ref 0–1.5)
MODE: AC
MONOCYTES NFR BLD: 0.28 K/UL (ref 0.1–0.95)
MONOCYTES NFR BLD: 4 % (ref 2–12)
NEUTROPHILS NFR BLD: 90 % (ref 43–80)
NEUTS SEG NFR BLD: 6.3 K/UL (ref 1.8–7.3)
O2 CONTENT: 12.5 ML/DL
O2 SATURATION: 95.1 % (ref 92–98.5)
O2HB: 93.9 % (ref 94–97)
OPERATOR ID: ABNORMAL
PARTIAL THROMBOPLASTIN TIME: 28.8 SEC (ref 24.5–35.1)
PATIENT TEMP: 37 C
PCO2: 30.1 MMHG (ref 35–45)
PEEP/CPAP: 10 CMH2O
PFO2: 1.9 MMHG/%
PH BLOOD GAS: 7.51 (ref 7.35–7.45)
PHOSPHATE SERPL-MCNC: 3 MG/DL (ref 2.5–4.5)
PLATELET CONFIRMATION: NORMAL
PLATELET, FLUORESCENCE: 96 K/UL (ref 130–450)
PMV BLD AUTO: 11.1 FL (ref 7–12)
PO2: 76.1 MMHG (ref 75–100)
POTASSIUM SERPL-SCNC: 3.6 MMOL/L (ref 3.5–5)
POTASSIUM SERPL-SCNC: 4.4 MMOL/L (ref 3.5–5)
PROT SERPL-MCNC: 5.8 G/DL (ref 6.4–8.3)
PROTHROMBIN TIME: 13.5 SEC (ref 9.3–12.4)
RBC # BLD AUTO: 3.18 M/UL (ref 3.5–5.5)
RBC # BLD: ABNORMAL 10*6/UL
RI(T): 2.16
RR MECHANICAL: 20 B/MIN
SODIUM SERPL-SCNC: 141 MMOL/L (ref 132–146)
SODIUM SERPL-SCNC: 141 MMOL/L (ref 132–146)
SOURCE, BLOOD GAS: ABNORMAL
THB: 9.4 G/DL (ref 11.5–16.5)
TIME ANALYZED: 442
VT MECHANICAL: 400 ML
WBC OTHER # BLD: 7 K/UL (ref 4.5–11.5)

## 2023-08-28 PROCEDURE — 99291 CRITICAL CARE FIRST HOUR: CPT | Performed by: INTERNAL MEDICINE

## 2023-08-28 PROCEDURE — 6370000000 HC RX 637 (ALT 250 FOR IP): Performed by: INTERNAL MEDICINE

## 2023-08-28 PROCEDURE — C9113 INJ PANTOPRAZOLE SODIUM, VIA: HCPCS

## 2023-08-28 PROCEDURE — 2000000000 HC ICU R&B

## 2023-08-28 PROCEDURE — 85610 PROTHROMBIN TIME: CPT

## 2023-08-28 PROCEDURE — 6360000002 HC RX W HCPCS: Performed by: NURSE PRACTITIONER

## 2023-08-28 PROCEDURE — 94660 CPAP INITIATION&MGMT: CPT

## 2023-08-28 PROCEDURE — 80053 COMPREHEN METABOLIC PANEL: CPT

## 2023-08-28 PROCEDURE — 6360000002 HC RX W HCPCS

## 2023-08-28 PROCEDURE — 80048 BASIC METABOLIC PNL TOTAL CA: CPT

## 2023-08-28 PROCEDURE — 94003 VENT MGMT INPAT SUBQ DAY: CPT

## 2023-08-28 PROCEDURE — 82962 GLUCOSE BLOOD TEST: CPT

## 2023-08-28 PROCEDURE — 2700000000 HC OXYGEN THERAPY PER DAY

## 2023-08-28 PROCEDURE — 94640 AIRWAY INHALATION TREATMENT: CPT

## 2023-08-28 PROCEDURE — 6370000000 HC RX 637 (ALT 250 FOR IP)

## 2023-08-28 PROCEDURE — 99231 SBSQ HOSP IP/OBS SF/LOW 25: CPT

## 2023-08-28 PROCEDURE — 83735 ASSAY OF MAGNESIUM: CPT

## 2023-08-28 PROCEDURE — 82805 BLOOD GASES W/O2 SATURATION: CPT

## 2023-08-28 PROCEDURE — 2580000003 HC RX 258: Performed by: INTERNAL MEDICINE

## 2023-08-28 PROCEDURE — 84100 ASSAY OF PHOSPHORUS: CPT

## 2023-08-28 PROCEDURE — 85730 THROMBOPLASTIN TIME PARTIAL: CPT

## 2023-08-28 PROCEDURE — 2580000003 HC RX 258

## 2023-08-28 PROCEDURE — 85025 COMPLETE CBC W/AUTO DIFF WBC: CPT

## 2023-08-28 PROCEDURE — 99232 SBSQ HOSP IP/OBS MODERATE 35: CPT | Performed by: FAMILY MEDICINE

## 2023-08-28 PROCEDURE — 6360000002 HC RX W HCPCS: Performed by: INTERNAL MEDICINE

## 2023-08-28 RX ORDER — FUROSEMIDE 10 MG/ML
INJECTION INTRAMUSCULAR; INTRAVENOUS
Status: COMPLETED
Start: 2023-08-28 | End: 2023-08-28

## 2023-08-28 RX ORDER — POTASSIUM CHLORIDE 29.8 MG/ML
40 INJECTION INTRAVENOUS ONCE
Status: COMPLETED | OUTPATIENT
Start: 2023-08-28 | End: 2023-08-28

## 2023-08-28 RX ORDER — DEXAMETHASONE SODIUM PHOSPHATE 4 MG/ML
6 INJECTION, SOLUTION INTRA-ARTICULAR; INTRALESIONAL; INTRAMUSCULAR; INTRAVENOUS; SOFT TISSUE DAILY
Status: DISCONTINUED | OUTPATIENT
Start: 2023-08-28 | End: 2023-08-31

## 2023-08-28 RX ORDER — LEVETIRACETAM 500 MG/5ML
500 INJECTION, SOLUTION, CONCENTRATE INTRAVENOUS EVERY 12 HOURS
Status: DISCONTINUED | OUTPATIENT
Start: 2023-08-28 | End: 2023-09-13 | Stop reason: HOSPADM

## 2023-08-28 RX ORDER — FUROSEMIDE 10 MG/ML
20 INJECTION INTRAMUSCULAR; INTRAVENOUS DAILY
Status: DISCONTINUED | OUTPATIENT
Start: 2023-08-29 | End: 2023-08-28

## 2023-08-28 RX ORDER — FUROSEMIDE 10 MG/ML
40 INJECTION INTRAMUSCULAR; INTRAVENOUS ONCE
Status: COMPLETED | OUTPATIENT
Start: 2023-08-28 | End: 2023-08-28

## 2023-08-28 RX ORDER — PREDNISONE 5 MG/1
5 TABLET ORAL DAILY
Status: DISCONTINUED | OUTPATIENT
Start: 2023-09-03 | End: 2023-09-13 | Stop reason: HOSPADM

## 2023-08-28 RX ORDER — FUROSEMIDE 10 MG/ML
40 INJECTION INTRAMUSCULAR; INTRAVENOUS DAILY
Status: DISCONTINUED | OUTPATIENT
Start: 2023-08-29 | End: 2023-08-30

## 2023-08-28 RX ADMIN — Medication 250 MG: at 08:07

## 2023-08-28 RX ADMIN — DEXAMETHASONE SODIUM PHOSPHATE 6 MG: 4 INJECTION, SOLUTION INTRA-ARTICULAR; INTRALESIONAL; INTRAMUSCULAR; INTRAVENOUS; SOFT TISSUE at 18:09

## 2023-08-28 RX ADMIN — Medication 10 ML: at 20:33

## 2023-08-28 RX ADMIN — Medication 250 MG: at 01:32

## 2023-08-28 RX ADMIN — POTASSIUM CHLORIDE 40 MEQ: 29.8 INJECTION, SOLUTION INTRAVENOUS at 06:37

## 2023-08-28 RX ADMIN — CHLORHEXIDINE GLUCONATE 15 ML: 1.2 RINSE ORAL at 09:05

## 2023-08-28 RX ADMIN — POLYETHYLENE GLYCOL 3350 17 GRAM ORAL POWDER PACKET 17 G: at 09:07

## 2023-08-28 RX ADMIN — FUROSEMIDE 40 MG: 10 INJECTION, SOLUTION INTRAMUSCULAR; INTRAVENOUS at 09:05

## 2023-08-28 RX ADMIN — CHLORHEXIDINE GLUCONATE 15 ML: 1.2 RINSE ORAL at 20:32

## 2023-08-28 RX ADMIN — Medication 10 ML: at 09:05

## 2023-08-28 RX ADMIN — PREDNISONE 5 MG: 5 TABLET ORAL at 08:06

## 2023-08-28 RX ADMIN — FUROSEMIDE 40 MG: 10 INJECTION, SOLUTION INTRAMUSCULAR; INTRAVENOUS at 16:06

## 2023-08-28 RX ADMIN — LEVOTHYROXINE SODIUM 75 MCG: 0.07 TABLET ORAL at 06:29

## 2023-08-28 RX ADMIN — ARFORMOTEROL TARTRATE 15 MCG: 15 SOLUTION RESPIRATORY (INHALATION) at 07:55

## 2023-08-28 RX ADMIN — ARFORMOTEROL TARTRATE 15 MCG: 15 SOLUTION RESPIRATORY (INHALATION) at 19:51

## 2023-08-28 RX ADMIN — Medication 10 ML: at 09:06

## 2023-08-28 RX ADMIN — HEPARIN 300 UNITS: 100 SYRINGE at 20:33

## 2023-08-28 RX ADMIN — ALBUTEROL SULFATE 2.5 MG: 2.5 SOLUTION RESPIRATORY (INHALATION) at 07:55

## 2023-08-28 RX ADMIN — WATER 500 MG: 1 INJECTION INTRAMUSCULAR; INTRAVENOUS; SUBCUTANEOUS at 09:28

## 2023-08-28 RX ADMIN — ALBUTEROL SULFATE 2.5 MG: 2.5 SOLUTION RESPIRATORY (INHALATION) at 19:51

## 2023-08-28 RX ADMIN — SODIUM CHLORIDE, PRESERVATIVE FREE 40 MG: 5 INJECTION INTRAVENOUS at 08:06

## 2023-08-28 RX ADMIN — LEVETIRACETAM 500 MG: 100 SOLUTION ORAL at 08:07

## 2023-08-28 RX ADMIN — METOPROLOL TARTRATE 50 MG: 50 TABLET, FILM COATED ORAL at 08:07

## 2023-08-28 RX ADMIN — FUROSEMIDE 40 MG: 10 INJECTION INTRAMUSCULAR; INTRAVENOUS at 16:06

## 2023-08-28 RX ADMIN — AMIODARONE HYDROCHLORIDE 200 MG: 200 TABLET ORAL at 08:07

## 2023-08-28 RX ADMIN — ALBUTEROL SULFATE 2.5 MG: 2.5 SOLUTION RESPIRATORY (INHALATION) at 11:01

## 2023-08-28 RX ADMIN — HYDRALAZINE HYDROCHLORIDE 25 MG: 25 TABLET, FILM COATED ORAL at 08:07

## 2023-08-28 RX ADMIN — LEVETIRACETAM 500 MG: 100 INJECTION INTRAVENOUS at 20:32

## 2023-08-28 RX ADMIN — BUDESONIDE INHALATION 500 MCG: 0.5 SUSPENSION RESPIRATORY (INHALATION) at 07:55

## 2023-08-28 RX ADMIN — BUDESONIDE INHALATION 500 MCG: 0.5 SUSPENSION RESPIRATORY (INHALATION) at 19:51

## 2023-08-28 ASSESSMENT — PULMONARY FUNCTION TESTS
PIF_VALUE: 19
PIF_VALUE: 39
PIF_VALUE: 31
PIF_VALUE: 29
PIF_VALUE: 34
PIF_VALUE: 37
PIF_VALUE: 19
PIF_VALUE: 30
PIF_VALUE: 18
PIF_VALUE: 9
PIF_VALUE: 32
PIF_VALUE: 12
PIF_VALUE: 29
PIF_VALUE: 29

## 2023-08-28 ASSESSMENT — PAIN SCALES - GENERAL
PAINLEVEL_OUTOF10: 0

## 2023-08-28 NOTE — PROGRESS NOTES
08/28/23 1049   Patient Observation   Pulse 81   Respirations 23   SpO2 91 %   Vent Information   Ventilator Discontinue Yes  (extubated per order)     Patient was extubated to niv liters/min via 40%. Breath Sounds post extubation were rhonchi/ diminished. Stridor was not present post extubation. SPO2 was 91%.       Performed by  Sameer Mike RCP

## 2023-08-28 NOTE — PROGRESS NOTES
[address] [facility]             Pulmonary & Critical Care Medicine                MICU Progress Note                 Written by: Tonia Lott MD  Name: Mary Wu : 1955       Age: 78 y.o. MR/Act #    : [id number],  Billing  #    : [id number]   Admit Date: 2023  2:21 AM LOS: 9,   Hospital Day: 10 Room #      : 2271/3518-[id number]   PCP            : Manjit Duque MD,   Referred by: Francine Morales MD ICU Attending: MD Fredrick Preston date: 2023 10:05 AM   ICU Days:       7 Vent Days:    7 LOS: 9,                          Reason for ICU admission           Chief Complaint   Patient presents with    Shortness of Breath     (Started earlier last night, does wear home O2 on 6L)                          Brief HPI, Presentation & Synopsis                       79-year-old female with past medical history of acute on chronic respiratory failure, Pseudomonas pneumonia, stenotrophomonas pneumonia, pulmonary hypertension, pulmonary sarcoidosis IRMA on top of CKD, hyponatremia, anemia of chronic disease presented to the ER where she had PEA with cardiac arrest.  2 rounds of CPR and ROSC was achieved. She was intubated during the cardiac arrest.  Hypotension was noted and started on Levophed drip. CTA chest showed left-sided pneumonia. Admitted in ICU for septic shock, cardiac arrest and respiratory failure due to pneumonia.    =============================================    Mary Wu was seen and examined on 23      Patient is off sedation, awake, alert, following commands. Vent settings with FiO2 40%, PEEP of 8. Chest x-ray with evidence of pulmonary vascular congestion.   Plan diuresis with Lasix and Diamox and extubate to BiPAP.    =============================================    Active problem list of yesterday:  Active Hospital Problems    Diagnosis Date Noted    Moderate protein-calorie malnutrition ([address]) [E44.0] 2023    Palliative care COMPARISON: 05/16/2023 HISTORY: ORDERING SYSTEM PROVIDED HISTORY: cardiac arrest TECHNOLOGIST PROVIDED HISTORY: Has a \"code stroke\" or \"stroke alert\" been called? ->No Reason for exam:->cardiac arrest Decision Support Exception - unselect if not a suspected or confirmed emergency medical condition->Emergency Medical Condition (MA) What reading provider will be dictating this exam?->CRC FINDINGS: BRAIN/VENTRICLES: There is no acute intracranial hemorrhage, mass effect or midline shift. No abnormal extra-axial fluid collection. There is periventricular and subcortical low density concerning for microangiopathy. The overall appearance of this process is stable. No new abnormal areas of increased or decreased density are visualized. The ventricular size appears stable. The gray-white differentiation is maintained without evidence of an acute infarct. There is no evidence of hydrocephalus. ORBITS: The visualized portion of the orbits demonstrate no acute abnormality. SINUSES: The visualized paranasal sinuses demonstrates mucoperiosteal thickening within the maxillary antra bilaterally as well as along several ethmoid air cells compatible with chronic sinusitis. The mastoid air cells demonstrate no acute abnormality. There is an orogastric tube present. SOFT TISSUES/SKULL:  No acute abnormality of the visualized skull or soft tissues. 1.  No acute intracranial abnormality. 2.  Signs of deep white matter small vessel disease, stable. CT ABDOMEN PELVIS W IV CONTRAST Additional Contrast? None    Result Date: 8/19/2023  EXAMINATION: CT OF THE ABDOMEN AND PELVIS WITH CONTRAST 8/19/2023 9:20 am TECHNIQUE: CT of the abdomen and pelvis was performed with the administration of intravenous contrast. Multiplanar reformatted images are provided for review.  Automated exposure control, iterative reconstruction, and/or weight based adjustment of the mA/kV was utilized to reduce the radiation dose to as low as reasonably 96

## 2023-08-28 NOTE — PROGRESS NOTES
OT consult received and appreciated. Chart reviewed. Will hold evaluation per RN d/t agitation. Will evaluate at a later time. Thank you.  Danni Abad, OTR/L # [id number]

## 2023-08-28 NOTE — PROGRESS NOTES
08/28/23 0755   Patient Observation   Pulse (!) 101   Respirations 20   SpO2 95 %   Vent Information   Ventilator Day(s) 10   Ventilator ID [id number]   Vent Mode AC/PRVC   Ventilator Settings   FiO2  40 %   Insp Time (sec) 0.7 sec   Vt (Set, mL) 400 mL   Resp Rate (Set) 20 bmp   PEEP/CPAP (cmH2O) 10   Pressure Support (cm H2O) 0 cm H2O   Vent Patient Data (Readings)   Vt (Measured) 368 mL   Peak Inspiratory Pressure (cmH2O) 34 cmH2O   Rate Measured 21 br/min   Minute Volume (L/min) 8.5 Liters   Mean Airway Pressure (cmH2O) 16 cmH20   Plateau Pressure (cm H2O) 23 cm H2O   Driving Pressure 13   I:E Ratio 1:3.30   PEEP Intrinsic (cm H2O) 1 cm H2O   Static Compliance (L/cm H2O) 33   I Time/ I Time % 0 s   Backup Apnea On   Backup Rate 14 Breaths Per Minute   Backup Vt 400   Vent Alarm Settings   High Pressure (cmH2O) 50 cmH2O   Low Minute Volume (lpm) 6 L/min   High Minute Volume (lpm) 18 L/min   Low Exhaled Vt (ml) 300 mL   High Exhaled Vt (ml) 700 mL   RR Low (bpm) 14   RR High (bpm) 40 br/min   Apnea (secs) 20 secs   Additional Respiratoray Assessments   Humidification Source Heated wire   Humidification Temp 37   Circuit Condensation Drained   Ambu Bag With Mask At Bedside Yes   Airway Clearance   Suction ET Tube   Suction Device Inline suction catheter   Sputum Method Obtained Endotracheal   Sputum Amount Large   Sputum Color/Odor Lavonne Rank; [place]   Sputum Consistency Thick; Tenacious   ETT    Placement Date: 08/19/23   Present on Admission/Arrival: No  Placed By: In ED  Placement Verified By: Auscultation;Capnometry; Chest X-ray  Preoxygenation: Yes  Technique: Video laryngoscopy  Airway Type: Cuffed  Airway Tube Size: 7.5 mm  Location: Ora. ..    Secured At 23 cm   Measured From Lips   ETT Placement Center   Secured By Commercial tube chang

## 2023-08-28 NOTE — PROGRESS NOTES
Physical Therapy    PT consult to evaluate/treat received and appreciated. Pt chart reviewed and evaluation attempted. Pt is currently on hold for agitation. Will check back as able. Thank you.         Kristy Thibodeaux, PT, DPT   [id number]

## 2023-08-28 NOTE — PROGRESS NOTES
08/28/23 1027   Patient Observation   Pulse 74   Respirations 24   SpO2 96 %   Vent Information   Ventilator Day(s) 10   Ventilator ID [id number]   Vent Mode CPAP/PS   Ventilator Settings   FiO2  40 %   PEEP/CPAP (cmH2O) 8   Pressure Support (cm H2O) 10 cm H2O   Vent Patient Data (Readings)   Vt (Measured) 404 mL   Peak Inspiratory Pressure (cmH2O) 18 cmH2O   Rate Measured 23 br/min   Minute Volume (L/min) 4.95 Liters   Mean Airway Pressure (cmH2O) 10 cmH20   Plateau Pressure (cm H2O) 23 cm H2O   Driving Pressure 15   I:E Ratio 1:2.40   Backup Apnea On   Backup Rate 14 Breaths Per Minute   Backup Vt 400   Vent Alarm Settings   High Pressure (cmH2O) 50 cmH2O   Low Minute Volume (lpm) 6 L/min   High Minute Volume (lpm) 18 L/min   Low Exhaled Vt (ml) 300 mL   High Exhaled Vt (ml) 700 mL   RR Low (bpm) 14   RR High (bpm) 40 br/min   Apnea (secs) 20 secs   Additional Respiratoray Assessments   Humidification Source Heated wire   Humidification Temp 37   Ambu Bag With Mask At Bedside Yes   Airway Clearance   Suction ET Tube   Suction Device Inline suction catheter   Sputum Method Obtained Endotracheal   Sputum Amount Large   Sputum Color/Odor Tan;Brown   Sputum Consistency Thick   ETT    Placement Date: 08/19/23   Present on Admission/Arrival: No  Placed By: In ED  Placement Verified By: Auscultation;Capnometry; Chest X-ray  Preoxygenation: Yes  Technique: Video laryngoscopy  Airway Type: Cuffed  Airway Tube Size: 7.5 mm  Location: Ora. ..    Secured At 23 cm   Measured From Lips   Secured By Commercial tube chang

## 2023-08-28 NOTE — DISCHARGE INSTRUCTIONS
HEART FAILURE  / CONGESTIVE HEART FAILURE  DISCHARGE INSTRUCTIONS:  GUIDELINES TO FOLLOW AT HOME    Self- Managed Care:     MEDICATIONS:  Take your medication as directed. If you are experiencing any side effects, inform your doctor, Do not stop taking any of your medications without letting your doctor know. Check with your doctor before taking any over-the-counter medications / herbal / or dietary supplements. They may interfere with your other medications. Do not take ibuprofen (Advil or Motrin) and naproxen (Aleve) without talking to your doctor first. They could make your heart failure worse. WEIGHT MONITORING:   Weigh yourself everyday (with the same scale) around the same time of the day and write it down. (you can chart them on a calendar or keep track of them on paper. Notify your doctor of a weight gain of 3 pounds or more in 1 day   OR a total of 5 pounds or more in 1 week    Take your weight record to your doctor visits  Also, the same goes if you loose more than 3# in one day, let your heart doctor know. DIET:   Cardiac heart healthy diet- Low saturated / low trans fat, no added salt, caffeine restricted, Low sodium diet-   No more than 2,000mg (2 grams) of salt / sodium per day (which equals to a little less than  a teaspoon of salt)  If your doctor wants you on a fluid restriction. ..it is usually recommended a fluid limit of 2,000cc -  Fluid restriction- 2,000 ml (milliliters) = 64 ounces = you can have 8 glasses of fluid per day (each glass 8 ounces)    Follow a low salt diet - avoid using salt at the table, avoid / limit use of canned soups, processed / packaged foods, salted snacks, olives and pickles. Do not use a salt substitute without checking with your doctor, they may contain a high amount of potassioum. (Mrs. Thersia Goldberg is safe to use).     Limit the use of alcohol       CALL YOUR DOCTOR THE FIRST DAY YOU NOTICE ANY OF THESE   SYMPTOMS:  You have a weight gain of 3 pounds or more in 1 day         OR 5 pounds or more in one week  More shortness of breath  More swelling of your stomach, legs, ankles or feet  Feeling more tired, No energy  Dry hacky cough  Dizziness  More chest pain / discomfort       (CALL 911 IF ANY OF THE FOLLOWING OCCURS  Chest pain (not relieved with nitroglycerine, if you have been prescribed this medication)  Severe shortness of breath  Faint / Pass out  Confusion / cannot think clearly  If symptoms get worse           SMOKING - TOBACCO USE:  * IF YOU SMOKE - STOP! Kick the habit. [facility] is offered at [address] [facility] and [address] [facility]. Call [phone] extension [facility] for more information. ACTIVITY:   (Ask your doctor when you will be able to return to work and before starting any exercise program.  Do not drive unless unless your doctor has given you permission to do so). Start light exercise. Even if you can only do a small amount, exercise will help you get stronger, have more energy, help manage your weight and decrease  stress. Walking is an easy way to get exercise. Start out slowly and  increase the amount you walk as tolerated  If you become short of breath, dizzy or have chest pain; stop, sit down, and rest.  If you feel \"wiped out\" the day after you exercise, walk at a slower pace or for a shorter distance. You can gradually increase the pace or amount of time. (Do not exercise right after a meal or in extreme temperatures, such as above 85 degrees, if the air is really humid, or wind chill is less than 20 degrees)                                             ADDITIONAL INFORMATION:  Avoid getting sick from colds and the flu. Stay away from friends or family that you know may have a contagious illness  Get plenty of rest   Get a flu shot each year. Get a pneumococcal vaccine shot.  If you have had one before, ask

## 2023-08-28 NOTE — PROGRESS NOTES
08/28/23 1529   NIV Type   NIV Started/Stopped On   Equipment Type v60   Mode AVAPS   Mask Type Full face mask   Mask Size Small   Assessment   Pulse 76   Heart Rate Source Monitor   Respirations 24   SpO2 92 %   Level of Consciousness 0   Comfort Level Fair   Using Accessory Muscles Yes   Mask Compliance Good   Skin Assessment Clean, dry, & intact   Skin Protection for O2 Device Yes   Orientation Middle   Location Nose   Settings/Measurements   PIP Observed 14 cm H20   CPAP/EPAP 8 cmH2O   IPAP Min 18 cmH2O   IPAP Max 30 cmH2O   Vt (Set, mL) 400 mL   Vt (Measured) 412 mL   Rate Ordered 16   Insp Rise Time (%) 3 %   FiO2  40 %   I Time/ I Time % 0.75 s   Minute Volume (L/min) 14 Liters   Mask Leak (lpm) 41 lpm   Alarm Settings   Alarms On Y   Low Pressure (cmH2O) 8 cmH2O   High Pressure (cmH2O) 35 cmH2O   RR Low (bpm) 15   RR High (bpm) 40 br/min

## 2023-08-28 NOTE — PLAN OF CARE
Problem: Chronic Conditions and Co-morbidities  Goal: Patient's chronic conditions and co-morbidity symptoms are monitored and maintained or improved  8/27/2023 2031 by Sly Singh RN  Outcome: Progressing  Flowsheets (Taken 8/27/2023 2000)  Care Plan - Patient's Chronic Conditions and Co-Morbidity Symptoms are Monitored and Maintained or Improved: Monitor and assess patient's chronic conditions and comorbid symptoms for stability, deterioration, or improvement  8/27/2023 1816 by John Gresham RN  Outcome: Progressing  8/27/2023 0824 by Margaux Crow RN  Outcome: Progressing     Problem: Discharge Planning  Goal: Discharge to home or other facility with appropriate resources  8/27/2023 2031 by Sly Singh RN  Outcome: Progressing  Flowsheets (Taken 8/27/2023 2000)  Discharge to home or other facility with appropriate resources: Identify barriers to discharge with patient and caregiver  8/27/2023 1816 by John Gresham RN  Outcome: Progressing     Problem: Safety - Adult  Goal: Free from fall injury  8/27/2023 2031 by Sly Singh RN  Outcome: Progressing  8/27/2023 1816 by John Gresham RN  Outcome: Progressing  8/27/2023 0824 by Margaux Crow RN  Outcome: Progressing     Problem: Pain  Goal: Verbalizes/displays adequate comfort level or baseline comfort level  8/27/2023 2031 by Sly Singh RN  Outcome: Progressing  8/27/2023 1816 by John Gresham RN  Outcome: Progressing  8/27/2023 0824 by Margaux Crow RN  Outcome: Progressing     Problem: Skin/Tissue Integrity  Goal: Absence of new skin breakdown  Description: 1. Monitor for areas of redness and/or skin breakdown  2. Assess vascular access sites hourly  3. Every 4-6 hours minimum:  Change oxygen saturation probe site  4. Every 4-6 hours:  If on nasal continuous positive airway pressure, respiratory therapy assess nares and determine need for appliance change or resting period.   8/27/2023 2031 by Sly Singh RN  Outcome:

## 2023-08-28 NOTE — PROGRESS NOTES
08/28/23 1100   NIV Type   Ventilator ID [id number]   Suction Setup and Functional Yes   NIV Started/Stopped On   Equipment Type v60   Mode AVAPS   Mask Type Full face mask   Mask Size Small   Assessment   Pulse 74   Heart Rate Source Monitor   Respirations 18   SpO2 100 %   Level of Consciousness 0   Comfort Level Fair   Using Accessory Muscles Yes   Mask Compliance Fair   Skin Assessment Clean, dry, & intact   Settings/Measurements   PIP Observed 16 cm H20   CPAP/EPAP 8 cmH2O   IPAP Min 18 cmH2O   IPAP Max 30 cmH2O   Vt (Set, mL) 400 mL   Vt (Measured) 531 mL   Rate Ordered 16   Insp Rise Time (%) 3 %   FiO2  40 %   I Time/ I Time % 0.75 s   Minute Volume (L/min) 11.6 Liters   Mask Leak (lpm) 46 lpm   Patient's Home Machine No   Alarm Settings   Alarms On Y   Low Pressure (cmH2O) 8 cmH2O   High Pressure (cmH2O) 35 cmH2O   RR Low (bpm) 15   RR High (bpm) 40 br/min

## 2023-08-28 NOTE — PLAN OF CARE
Patient's chart updated to reflect:      . - HF care plan, HF education points and HF discharge instructions.  -Orders: 2 gram sodium diet, daily weights, I/O.  -PCP and cardiology follow up appointments to be scheduled within 7 days of hospital discharge.       Kendal Carrera, RN   Heart Failure Navigator

## 2023-08-28 NOTE — PROGRESS NOTES
08/28/23 1043   Patient Observation   Pulse 85   Respirations 26   SpO2 94 %   Ventilator Settings   FiO2  40 %   PEEP/CPAP (cmH2O) 5   Pressure Support (cm H2O) 5 cm H2O   Vent Patient Data (Readings)   Vt (Measured) 601 mL   Peak Inspiratory Pressure (cmH2O) 9 cmH2O   Rate Measured 26 br/min   Minute Volume (L/min) 10.8 Liters   Mean Airway Pressure (cmH2O) 7 cmH20   Plateau Pressure (cm H2O) 23 cm H2O   Driving Pressure 18   I:E Ratio 1:1.40   Vent Alarm Settings   High Pressure (cmH2O) 50 cmH2O   Low Exhaled Vt (ml) 0 mL     Spontaneous Parameters performed  Ps 5/5 positive cuff leak  VT = 601 ml  f = 26  B/M  Ve = 10.8 L/M  NIF = -21  cmH2O  VC = unable L  RSBI = 78      Performed by Audrey Pulliam RCP

## 2023-08-28 NOTE — PROGRESS NOTES
08/28/23 1500   NIV Type   Ventilator ID [id number]   NIV Started/Stopped On  (patient requested to be placed back on NIV at this time.)   Equipment Type v60   Mode AVAPS   Mask Type Full face mask   Mask Size Small   Assessment   Pulse 86   Heart Rate Source Monitor   Respirations 30   SpO2 95 %   Level of Consciousness 0   Comfort Level Fair   Using Accessory Muscles Yes   Mask Compliance Good   Skin Assessment Clean, dry, & intact   Skin Protection for O2 Device Yes   Orientation Middle   Location Nose   Breath Sounds   Breath Sounds Bilateral Clear;Diminished   Right Upper Lobe Clear;Diminished   Right Middle Lobe Clear;Diminished   Right Lower Lobe Clear;Diminished   Left Upper Lobe Clear;Diminished   Left Lower Lobe Clear;Diminished   Settings/Measurements   PIP Observed 16 cm H20   CPAP/EPAP 8 cmH2O   IPAP Min 18 cmH2O   IPAP Max 30 cmH2O   Vt (Set, mL) 400 mL   Vt (Measured) 467 mL   Rate Ordered 16   Insp Rise Time (%) 3 %   FiO2  40 %   I Time/ I Time % 0.75 s   Minute Volume (L/min) 16 Liters   Mask Leak (lpm) 49 lpm   Patient's Home Machine No   Alarm Settings   Alarms On Y   Low Pressure (cmH2O) 8 cmH2O   High Pressure (cmH2O) 35 cmH2O   RR Low (bpm) 15   RR High (bpm) 40 br/min

## 2023-08-28 NOTE — CARE COORDINATION
Care Coordination: LOS 9 Day. Extubated this am to Bipap FIO2 40%. [facility] is following- [facility] dual.  Pt [facility] with [facility].  Will benefit from therapy once medically appropriate    Electronically signed by Courtney Mac RN on 8/28/2023 at 11:21 AM

## 2023-08-28 NOTE — PROGRESS NOTES
[facility] Inpatient   Resident Progress Note    S:  Hospital day: 9    Brief Synopsis: Rob Batista is a 78 y.o. female with a past medical history of pulmonary sarcoidosis, atrial fibrillation on Eliquis, pulmonary hypertension, diabetes insipidus, hypothyroidism, hypertension, chronic kidney disease stage IIIa and combined systolic/diastolic heart failure who presents to the emergency department for shortness of breath. Patient was recently discharged from [facility] (08/18/23) due to acute hypoxic respiratory failure. Patient has had numerous hospital admissions for the same complaint. Hypotensive, tachycardic and hypoxic at presentation. She did decompensate and ended up in PEA. 2 rounds of CPR were performed and ROSC achieved. Patient admitted to the intensive care unit for acute hypoxic respiratory failure requiring intubation and vasopressors. Overnight/Interim  Patient was seen and examined this morning. Intubated, no longer sedated and not on any pressors. Patient was able to follow simple commands.     Cont meds:    sodium chloride      sodium chloride 10 mL/hr at 08/28/23 1857    dextrose       Scheduled meds:    [START ON 8/29/2023] furosemide  40 mg IntraVENous Daily    dexamethasone  6 mg IntraVENous Daily    [START ON 9/3/2023] predniSONE  5 mg Oral Daily    levETIRAcetam  500 mg IntraVENous Q12H    potassium & sodium phosphates  1 packet Per NG tube q8h    [Held by provider] levETIRAcetam  500 mg Oral BID    [Held by provider] metoprolol tartrate  50 mg Oral BID    lidocaine PF  5 mL IntraDERmal Once    sodium chloride flush  5-40 mL IntraVENous 2 times per day    heparin flush  3 mL IntraVENous 2 times per day    chlorhexidine  15 mL Mouth/Throat BID    amiodarone  200 mg Oral Daily    budesonide  500 mcg Nebulization BID RT    arformoterol tartrate  15 mcg Nebulization BID RT    [Held by provider] desmopressin  200 mcg Oral BID    senna  1 tablet Oral Nightly negative for acute ischemic changes  ProBNP trending up to 16,648 from 2,839. Chest x-ray showing possible fluid overload. Lasix currently held due to worsening kidney function. Monitor volume status   Respiratory panel negative  Legionella antigen and strep pneumo antigen negative  Respiratory culture showing rare G+ cocci and rods, epithelial cells and polymorphonuclear leucocytes on prelim report  Urine culture showing mixed G+ tanesha 10-50,000  ID on board. Pending 1,3 beta-D- glucan. Stopped Linezolid and continue and Zerbaxa   Blood culture - no growth in 4 days  Continue solumedrol 40 mg IV every 8 hours  Off pressors. Holding Toprol XL and hydralazine. Continue to trend procal 0.17 > 3.95 > 2.06  Intubated. Off sedation and pressors. Monitor daily chest x-ray and ABGs  Palliative care on board. Family requesting patient remain full code. Pending Trach and PEG plans   Management per ICU  On Prednisone for Sarcoid     Acute on chronic kidney disease  Hyponatremia and hypochloremia  History of diabetes insipidus  Lasix held due to worsening kidney function  Nephro on board - will resume home desmopressin if sodium levels increase. NICOM showed fluid responsiveness - On Albumin  FWF increased to 80 cc/hr  Strict I/Os  Cr normal 0.8  Management per ICU    Umbilical Hernia  Constipation  Continue polyethylene glycol and senna  Holding ferrous sulfate  CT abdomen/pelvis was unremarkable for any mechanical obstruction   Gen surg was consulted for umbilical hernia with suspecting cecal volvulus - Plan to monitor bowel movements with no acute surgical intervention at this time. Chronic microcytic anemia  History of iron deficiency  Leukopenia  Transfused 2 units RBC ON 8/23.  monitor H/H and transfused if hemoglobin < 7  Consider restarting ferrous sulfate, currently holding it due to constipation  Management per ICU     Hypoglycemia  Hypoglycemia protocol     Paroxysmal atrial fibrillation  Currently holding Toprol XL due low blood pressure  Eliquis held  Continue amiodarone 200 mg daily        PT/OT evaluation: To be consulted when patient leaves ICU  DVT/GI prophylaxis: SCD/protonix  Diet: N.p.o.  Full code  Disposition: ICU-level care and management    consulted for placement at discharge. Recommend facility due to multiple hospital admissions.       Electronically signed by Jac Barrera MD on 8/28/2023 at 7:28 PM  This case was discussed with attending physician Dr. Addison Doing

## 2023-08-29 ENCOUNTER — APPOINTMENT (OUTPATIENT)
Dept: GENERAL RADIOLOGY | Age: 68
DRG: 870 | End: 2023-08-29
Payer: MEDICARE

## 2023-08-29 LAB
ALBUMIN SERPL-MCNC: 3.8 G/DL (ref 3.5–5.2)
ALP SERPL-CCNC: 116 U/L (ref 35–104)
ALT SERPL-CCNC: 57 U/L (ref 0–32)
ANION GAP SERPL CALCULATED.3IONS-SCNC: 12 MMOL/L (ref 7–16)
ANION GAP SERPL CALCULATED.3IONS-SCNC: 17 MMOL/L (ref 7–16)
AST SERPL-CCNC: 28 U/L (ref 0–31)
BASOPHILS # BLD: 0.01 K/UL (ref 0–0.2)
BASOPHILS NFR BLD: 0 % (ref 0–2)
BILIRUB SERPL-MCNC: 0.5 MG/DL (ref 0–1.2)
BNP SERPL-MCNC: ABNORMAL PG/ML (ref 0–125)
BUN SERPL-MCNC: 20 MG/DL (ref 6–23)
BUN SERPL-MCNC: 24 MG/DL (ref 6–23)
CALCIUM SERPL-MCNC: 9.2 MG/DL (ref 8.6–10.2)
CALCIUM SERPL-MCNC: 9.3 MG/DL (ref 8.6–10.2)
CHLORIDE SERPL-SCNC: 105 MMOL/L (ref 98–107)
CHLORIDE SERPL-SCNC: 105 MMOL/L (ref 98–107)
CO2 SERPL-SCNC: 25 MMOL/L (ref 22–29)
CO2 SERPL-SCNC: 28 MMOL/L (ref 22–29)
CREAT SERPL-MCNC: 1 MG/DL (ref 0.5–1)
CREAT SERPL-MCNC: 1 MG/DL (ref 0.5–1)
CRP SERPL HS-MCNC: 95 MG/L (ref 0–5)
EOSINOPHIL # BLD: 0 K/UL (ref 0.05–0.5)
EOSINOPHILS RELATIVE PERCENT: 0 % (ref 0–6)
ERYTHROCYTE [DISTWIDTH] IN BLOOD BY AUTOMATED COUNT: 17.9 % (ref 11.5–15)
GFR SERPL CREATININE-BSD FRML MDRD: 59 ML/MIN/1.73M2
GFR SERPL CREATININE-BSD FRML MDRD: >60 ML/MIN/1.73M2
GLUCOSE SERPL-MCNC: 123 MG/DL (ref 74–99)
GLUCOSE SERPL-MCNC: 83 MG/DL (ref 74–99)
HCT VFR BLD AUTO: 27.5 % (ref 34–48)
HGB BLD-MCNC: 8.6 G/DL (ref 11.5–15.5)
IMM GRANULOCYTES # BLD AUTO: 0.06 K/UL (ref 0–0.58)
IMM GRANULOCYTES NFR BLD: 1 % (ref 0–5)
LV EF: 60 %
LVEF MODALITY: NORMAL
LYMPHOCYTES NFR BLD: 0.24 K/UL (ref 1.5–4)
LYMPHOCYTES RELATIVE PERCENT: 5 % (ref 20–42)
MAGNESIUM SERPL-MCNC: 2 MG/DL (ref 1.6–2.6)
MCH RBC QN AUTO: 26.3 PG (ref 26–35)
MCHC RBC AUTO-ENTMCNC: 31.3 G/DL (ref 32–34.5)
MCV RBC AUTO: 84.1 FL (ref 80–99.9)
MONOCYTES NFR BLD: 0.14 K/UL (ref 0.1–0.95)
MONOCYTES NFR BLD: 3 % (ref 2–12)
NEUTROPHILS NFR BLD: 90 % (ref 43–80)
NEUTS SEG NFR BLD: 4.12 K/UL (ref 1.8–7.3)
PHOSPHATE SERPL-MCNC: 4.4 MG/DL (ref 2.5–4.5)
PLATELET, FLUORESCENCE: 113 K/UL (ref 130–450)
PMV BLD AUTO: 11.3 FL (ref 7–12)
POTASSIUM SERPL-SCNC: 3.6 MMOL/L (ref 3.5–5)
POTASSIUM SERPL-SCNC: 3.9 MMOL/L (ref 3.5–5)
POTASSIUM, UR: 42.7 MMOL/L
PROCALCITONIN SERPL-MCNC: 0.09 NG/ML (ref 0–0.08)
PROT SERPL-MCNC: 6.6 G/DL (ref 6.4–8.3)
RBC # BLD AUTO: 3.27 M/UL (ref 3.5–5.5)
RBC # BLD: ABNORMAL 10*6/UL
SODIUM SERPL-SCNC: 145 MMOL/L (ref 132–146)
SODIUM SERPL-SCNC: 147 MMOL/L (ref 132–146)
SODIUM UR-SCNC: 140 MMOL/L
WBC OTHER # BLD: 4.6 K/UL (ref 4.5–11.5)

## 2023-08-29 PROCEDURE — 6360000002 HC RX W HCPCS

## 2023-08-29 PROCEDURE — 83935 ASSAY OF URINE OSMOLALITY: CPT

## 2023-08-29 PROCEDURE — 94660 CPAP INITIATION&MGMT: CPT

## 2023-08-29 PROCEDURE — 6370000000 HC RX 637 (ALT 250 FOR IP)

## 2023-08-29 PROCEDURE — 84133 ASSAY OF URINE POTASSIUM: CPT

## 2023-08-29 PROCEDURE — 86140 C-REACTIVE PROTEIN: CPT

## 2023-08-29 PROCEDURE — 2700000000 HC OXYGEN THERAPY PER DAY

## 2023-08-29 PROCEDURE — 6370000000 HC RX 637 (ALT 250 FOR IP): Performed by: INTERNAL MEDICINE

## 2023-08-29 PROCEDURE — 83880 ASSAY OF NATRIURETIC PEPTIDE: CPT

## 2023-08-29 PROCEDURE — 83735 ASSAY OF MAGNESIUM: CPT

## 2023-08-29 PROCEDURE — 84100 ASSAY OF PHOSPHORUS: CPT

## 2023-08-29 PROCEDURE — 94640 AIRWAY INHALATION TREATMENT: CPT

## 2023-08-29 PROCEDURE — C9113 INJ PANTOPRAZOLE SODIUM, VIA: HCPCS

## 2023-08-29 PROCEDURE — 80053 COMPREHEN METABOLIC PANEL: CPT

## 2023-08-29 PROCEDURE — 2000000000 HC ICU R&B

## 2023-08-29 PROCEDURE — 80048 BASIC METABOLIC PNL TOTAL CA: CPT

## 2023-08-29 PROCEDURE — 2580000003 HC RX 258

## 2023-08-29 PROCEDURE — 2580000003 HC RX 258: Performed by: INTERNAL MEDICINE

## 2023-08-29 PROCEDURE — 51798 US URINE CAPACITY MEASURE: CPT

## 2023-08-29 PROCEDURE — 84145 PROCALCITONIN (PCT): CPT

## 2023-08-29 PROCEDURE — 84300 ASSAY OF URINE SODIUM: CPT

## 2023-08-29 PROCEDURE — 85025 COMPLETE CBC W/AUTO DIFF WBC: CPT

## 2023-08-29 PROCEDURE — 93306 TTE W/DOPPLER COMPLETE: CPT

## 2023-08-29 PROCEDURE — 71045 X-RAY EXAM CHEST 1 VIEW: CPT

## 2023-08-29 PROCEDURE — 6360000002 HC RX W HCPCS: Performed by: INTERNAL MEDICINE

## 2023-08-29 PROCEDURE — 36592 COLLECT BLOOD FROM PICC: CPT

## 2023-08-29 PROCEDURE — 99291 CRITICAL CARE FIRST HOUR: CPT | Performed by: INTERNAL MEDICINE

## 2023-08-29 PROCEDURE — 99232 SBSQ HOSP IP/OBS MODERATE 35: CPT | Performed by: FAMILY MEDICINE

## 2023-08-29 RX ORDER — DEXTROSE MONOHYDRATE 50 MG/ML
INJECTION, SOLUTION INTRAVENOUS CONTINUOUS
Status: DISCONTINUED | OUTPATIENT
Start: 2023-08-29 | End: 2023-08-30

## 2023-08-29 RX ADMIN — HEPARIN 300 UNITS: 100 SYRINGE at 08:37

## 2023-08-29 RX ADMIN — Medication 250 MG: at 08:35

## 2023-08-29 RX ADMIN — BUDESONIDE INHALATION 500 MCG: 0.5 SUSPENSION RESPIRATORY (INHALATION) at 09:31

## 2023-08-29 RX ADMIN — Medication 250 MG: at 17:04

## 2023-08-29 RX ADMIN — BUDESONIDE INHALATION 500 MCG: 0.5 SUSPENSION RESPIRATORY (INHALATION) at 21:17

## 2023-08-29 RX ADMIN — ARFORMOTEROL TARTRATE 15 MCG: 15 SOLUTION RESPIRATORY (INHALATION) at 09:31

## 2023-08-29 RX ADMIN — HEPARIN 300 UNITS: 100 SYRINGE at 21:31

## 2023-08-29 RX ADMIN — DEXTROSE MONOHYDRATE: 50 INJECTION, SOLUTION INTRAVENOUS at 14:49

## 2023-08-29 RX ADMIN — AMIODARONE HYDROCHLORIDE 200 MG: 200 TABLET ORAL at 08:36

## 2023-08-29 RX ADMIN — LEVETIRACETAM 500 MG: 100 INJECTION INTRAVENOUS at 08:36

## 2023-08-29 RX ADMIN — LEVETIRACETAM 500 MG: 100 INJECTION INTRAVENOUS at 21:32

## 2023-08-29 RX ADMIN — HYDRALAZINE HYDROCHLORIDE 25 MG: 25 TABLET, FILM COATED ORAL at 08:36

## 2023-08-29 RX ADMIN — Medication 10 ML: at 21:24

## 2023-08-29 RX ADMIN — SODIUM CHLORIDE, PRESERVATIVE FREE 40 MG: 5 INJECTION INTRAVENOUS at 08:35

## 2023-08-29 RX ADMIN — DEXAMETHASONE SODIUM PHOSPHATE 6 MG: 4 INJECTION, SOLUTION INTRA-ARTICULAR; INTRALESIONAL; INTRAMUSCULAR; INTRAVENOUS; SOFT TISSUE at 08:36

## 2023-08-29 RX ADMIN — FUROSEMIDE 40 MG: 10 INJECTION, SOLUTION INTRAMUSCULAR; INTRAVENOUS at 08:35

## 2023-08-29 RX ADMIN — Medication 10 ML: at 21:25

## 2023-08-29 RX ADMIN — ACETAMINOPHEN 650 MG: 325 TABLET ORAL at 08:36

## 2023-08-29 RX ADMIN — ARFORMOTEROL TARTRATE 15 MCG: 15 SOLUTION RESPIRATORY (INHALATION) at 21:17

## 2023-08-29 ASSESSMENT — PAIN SCALES - GENERAL
PAINLEVEL_OUTOF10: 0
PAINLEVEL_OUTOF10: 2
PAINLEVEL_OUTOF10: 0

## 2023-08-29 ASSESSMENT — PAIN - FUNCTIONAL ASSESSMENT: PAIN_FUNCTIONAL_ASSESSMENT: PREVENTS OR INTERFERES SOME ACTIVE ACTIVITIES AND ADLS

## 2023-08-29 ASSESSMENT — PAIN SCALES - WONG BAKER
WONGBAKER_NUMERICALRESPONSE: 0

## 2023-08-29 ASSESSMENT — PAIN DESCRIPTION - LOCATION: LOCATION: GENERALIZED

## 2023-08-29 ASSESSMENT — PAIN DESCRIPTION - PAIN TYPE: TYPE: CHRONIC PAIN

## 2023-08-29 ASSESSMENT — PAIN DESCRIPTION - ONSET: ONSET: ON-GOING

## 2023-08-29 ASSESSMENT — PAIN DESCRIPTION - DESCRIPTORS: DESCRIPTORS: PATIENT UNABLE TO DESCRIBE

## 2023-08-29 NOTE — PLAN OF CARE
Problem: Chronic Conditions and Co-morbidities  Goal: Patient's chronic conditions and co-morbidity symptoms are monitored and maintained or improved  8/29/2023 0946 by Alejandra Diop RN  Outcome: Progressing  8/29/2023 0416 by Amy Vizcarra RN  Outcome: Progressing  8/28/2023 2107 by Amy Vizcarra RN  Outcome: Progressing     Problem: Discharge Planning  Goal: Discharge to home or other facility with appropriate resources  8/29/2023 0946 by Alejandra Diop RN  Outcome: Progressing  8/29/2023 0416 by Amy Vizcarra RN  Outcome: Progressing  8/28/2023 2107 by Amy Vizcarra RN  Outcome: Progressing     Problem: Safety - Adult  Goal: Free from fall injury  8/29/2023 0946 by Alejandra Diop RN  Outcome: Progressing  8/29/2023 0416 by Amy Vizcarra RN  Outcome: Progressing  8/28/2023 2107 by Amy Vizcarra RN  Outcome: Progressing     Problem: Pain  Goal: Verbalizes/displays adequate comfort level or baseline comfort level  8/29/2023 0946 by Alejandra Diop RN  Outcome: Progressing  8/29/2023 0416 by Amy Vizcarra RN  Outcome: Progressing  8/28/2023 2107 by Amy Vizcarra RN  Outcome: Progressing

## 2023-08-29 NOTE — PROGRESS NOTES
the gastroesophageal junction. The side hole is seen at the level of the gastroesophageal junction. The NG tube should be advanced by an additional 5-10 cm if possible. All Others since admission  CT HEAD WO CONTRAST    Result Date: 8/19/2023  1. No acute intracranial abnormality. 2.  Signs of deep white matter small vessel disease, stable. CT ABDOMEN PELVIS W IV CONTRAST Additional Contrast? None    Result Date: 8/19/2023  1. Increased bibasilar areas of consolidation and or atelectasis. 2. The cecum is markedly distended and stool-filled raising the possibility of constipation. As this area appears isolated, a developing cecal volvulus could give a similar appearance. Short-term follow-up is recommended after medical therapy. 3. Multilevel degenerative disc disease 4. Compression fractures involving L5 and T12, relatively stable. 5. High position of the nasogastric tube and this may be advanced into the body 5-6 cm. 6. Prominent umbilical hernia     XR CHEST PORTABLE    Result Date: 8/21/2023  1. Improved bilateral areas of consolidation and atelectasis 2. Decreased pleural effusions 3. Residual consolidative process at the left lung base that continues to silhouettes the diaphragm and heart border and short-term follow-up is suggested     XR CHEST PORTABLE    Result Date: 8/20/2023  Progressing pulmonary edema. Interval clearing of left retrocardiac opacity. XR CHEST PORTABLE    Result Date: 8/20/2023  Central pulmonary artery hypertension. Left retrocardiac atelectasis or pneumonic consolidation. XR CHEST PORTABLE    Result Date: 8/19/2023  1. Increased interstitial prominence within the lungs concerning for developing pulmonary edema or fluid overload 2. Increased bibasilar linear opacities suggest worsening atelectasis.  3. 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 Neurology consult requested. Keppra continued. MRI brain did not show any acute abnormality except for chronic ischemic changes. .      Cardiovascular: Cardiac arrest/off pressors    Diuresis to maintain negative fluid balance  Patient is on amiodarone, hydralazine and metoprolol  Echocardiogram pending    Abdomen/GI: CT abdomen with contrast did not show any mechanical obstruction, general surgery consult appreciated. Improving transaminitis noted. NAC Completed. Renal: Improvement in kidney functions noted with albumin. Nephrology following    MSK: No active issues   Skin: No active issues     Hematology: H&H stabilized with 2 units PRBC transfusion, continue to monitor CBC. ID: Pseudomonas is Zosyn resistance, considering IRMA. Zerbaxa 7-day course completed on 8/27/2023. ID consult appreciated. Endocrine: Monitor BS. On steroids and Synthroid, DDAVP on hold    DVT/GI: Prophylaxis: Eliquis on hold, SCD and Protonix    Lines: Left brachial PICC line    Code Status: Full Code. Discussed with daughter Marcello Diaz by bedside. This patient is unstable and critically ill with increased risk of clinical deterioration. There are life and organ supporting interventions that require frequent monitoring and personal assessment.  There is a high possibility of sudden, clinically significant or life-threatening deterioration in this patient's condition which may require prompt therapeutic interventions. I spent 40 independent minutes of critical care time excluding procedures.     Flora Alexandre MD MS  Pulmonary & [facility]

## 2023-08-29 NOTE — PROGRESS NOTES
[facility] Inpatient   Resident Progress Note    S:  Hospital day: 10    Brief Synopsis: Prabha Feldman is a 78 y.o. female with a past medical history of pulmonary sarcoidosis, atrial fibrillation on Eliquis, pulmonary hypertension, diabetes insipidus, hypothyroidism, hypertension, chronic kidney disease stage IIIa and combined systolic/diastolic heart failure who presents to the emergency department for shortness of breath. Patient was recently discharged from [facility] (08/18/23) due to acute hypoxic respiratory failure. Patient has had numerous hospital admissions for the same complaint. Hypotensive, tachycardic and hypoxic at presentation. She did decompensate and ended up in PEA. 2 rounds of CPR were performed and ROSC achieved. Patient admitted to the intensive care unit for acute hypoxic respiratory failure requiring intubation and vasopressors. Overnight/Interim  Patient was seen in the ICU and examined this morning. Patient is extubated on BiPaP 50% FiO2. She complains of chest pain. Patient was able to follow simple commands.       Cont meds:    sodium chloride      sodium chloride 10 mL/hr at 08/28/23 1857    dextrose       Scheduled meds:    furosemide  40 mg IntraVENous Daily    dexamethasone  6 mg IntraVENous Daily    [START ON 9/3/2023] predniSONE  5 mg Oral Daily    levETIRAcetam  500 mg IntraVENous Q12H    potassium & sodium phosphates  1 packet Per NG tube q8h    [Held by provider] levETIRAcetam  500 mg Oral BID    [Held by provider] metoprolol tartrate  50 mg Oral BID    lidocaine PF  5 mL IntraDERmal Once    sodium chloride flush  5-40 mL IntraVENous 2 times per day    heparin flush  3 mL IntraVENous 2 times per day    amiodarone  200 mg Oral Daily    budesonide  500 mcg Nebulization BID RT    arformoterol tartrate  15 mcg Nebulization BID RT    [Held by provider] desmopressin  200 mcg Oral BID    senna  1 tablet Oral Nightly    polyethylene glycol  17 g Oral Daily

## 2023-08-29 NOTE — PROGRESS NOTES
Date: 8/29/2023    Time: 9:54 AM    Patient Placed On BIPAP/CPAP/ Non-Invasive Ventilation? No    If no must comment. on from previous shift  Facial area red/color change? No           If YES are Blister/Lesion present? No   If yes must notify nursing staff  BIPAP/CPAP skin barrier?   Yes    Skin barrier type:mepilexlite       Comments:        Jonathan Fernandez, [facility]    08/29/23 0931   NIV Type   NIV Started/Stopped On   Mode AVAPS   Mask Type Full face mask   Mask Size Small   Assessment   Pulse 74   Respirations 22   SpO2 100 %   Level of Consciousness 0   Comfort Level Good   Using Accessory Muscles No   Mask Compliance Good   Skin Assessment Clean, dry, & intact   Skin Protection for O2 Device Yes   Breath Sounds   Breath Sounds Bilateral Clear   Settings/Measurements   CPAP/EPAP 8 cmH2O   IPAP Min 16 cmH2O   IPAP Max 24 cmH2O   Vt (Set, mL) 400 mL   Rate Ordered 16   Insp Rise Time (%) 3 %   FiO2  (S)  40 %   Minute Volume (L/min) 10.2 Liters   Mask Leak (lpm) 41 lpm   Patient's Home Machine No   Alarm Settings   Alarms On Y   Low Pressure (cmH2O) 8 cmH2O   High Pressure (cmH2O) 23 cmH2O   RR Low (bpm) 154   RR High (bpm) 15 br/min

## 2023-08-29 NOTE — PLAN OF CARE
Problem: Chronic Conditions and Co-morbidities  Goal: Patient's chronic conditions and co-morbidity symptoms are monitored and maintained or improved  Outcome: Progressing     Problem: Discharge Planning  Goal: Discharge to home or other facility with appropriate resources  Outcome: Progressing     Problem: Safety - Adult  Goal: Free from fall injury  Outcome: Progressing     Problem: Pain  Goal: Verbalizes/displays adequate comfort level or baseline comfort level  Outcome: Progressing     Problem: Skin/Tissue Integrity  Goal: Absence of new skin breakdown  Description: 1. Monitor for areas of redness and/or skin breakdown  2. Assess vascular access sites hourly  3. Every 4-6 hours minimum:  Change oxygen saturation probe site  4. Every 4-6 hours:  If on nasal continuous positive airway pressure, respiratory therapy assess nares and determine need for appliance change or resting period.   Outcome: Progressing     Problem: ABCDS Injury Assessment  Goal: Absence of physical injury  Outcome: Progressing     Problem: Neurosensory - Adult  Goal: Achieves stable or improved neurological status  Outcome: Progressing  Goal: Absence of seizures  Outcome: Progressing  Goal: Remains free of injury related to seizures activity  Outcome: Progressing  Goal: Achieves maximal functionality and self care  Outcome: Progressing     Problem: Respiratory - Adult  Goal: Achieves optimal ventilation and oxygenation  Outcome: Progressing     Problem: Cardiovascular - Adult  Goal: Maintains optimal cardiac output and hemodynamic stability  Outcome: Progressing  Goal: Absence of cardiac dysrhythmias or at baseline  Outcome: Progressing     Problem: Skin/Tissue Integrity - Adult  Goal: Skin integrity remains intact  Outcome: Progressing  Goal: Incisions, wounds, or drain sites healing without S/S of infection  Outcome: Progressing  Goal: Oral mucous membranes remain intact  Outcome: Progressing     Problem: Nutrition Deficit:  Goal: Optimize nutritional status  Outcome: Progressing

## 2023-08-29 NOTE — CARE COORDINATION
Care Coordination: LOS 10 day. Met with pt briefly, bipap on , discussed [facility] and she shakes head but unable to speak clearly with bipap  I have also left messages with  to discussed transition of care plan as pt only made it home one day last admission with all the help and equipment they have in place. Awaiting return call to obtain husbands input as well. Continues on bipap- per ICU team, attempt to wean to Airvo, ngt/tf. Nephrology following , continues on [email or web address], lasix iv. Will follow for transition of care needs. Also spoke to  from [facility] as well for any assistance speaking to family for transition of care planning [phone]. Electronically signed by Maribel Sanders RN on 8/29/2023 at 2:43 PM     Addendum: spoke to pt's  and he feels [facility] in [place] would be good for pt prior to home discharge. I have messaged [facility] who is following and will check with ICU team for recommendations as well    Electronically signed by Maribel Sanders RN on 8/29/2023 at 3:18 PM     Addendum: Spoke to ICU attending, Dr Siva Kaye, okay to initiate precert for [facility].  Will follow    Electronically signed by Maribel Sanders RN on 8/29/2023 at 3:32 PM

## 2023-08-29 NOTE — PLAN OF CARE
Problem: Chronic Conditions and Co-morbidities  Goal: Patient's chronic conditions and co-morbidity symptoms are monitored and maintained or improved  8/29/2023 0416 by Francine Herbert RN  Outcome: Progressing  8/28/2023 2107 by Francine Herbert RN  Outcome: Progressing     Problem: Discharge Planning  Goal: Discharge to home or other facility with appropriate resources  8/29/2023 0416 by Francine Herbert RN  Outcome: Progressing  8/28/2023 2107 by Francine Herbert RN  Outcome: Progressing     Problem: Safety - Adult  Goal: Free from fall injury  8/29/2023 0416 by Francine Herbert RN  Outcome: Progressing  8/28/2023 2107 by Francine Herbert RN  Outcome: Progressing     Problem: Pain  Goal: Verbalizes/displays adequate comfort level or baseline comfort level  8/29/2023 0416 by Francine Herbert RN  Outcome: Progressing  8/28/2023 2107 by Francine Herbert RN  Outcome: Progressing     Problem: Skin/Tissue Integrity  Goal: Absence of new skin breakdown  Description: 1. Monitor for areas of redness and/or skin breakdown  2. Assess vascular access sites hourly  3. Every 4-6 hours minimum:  Change oxygen saturation probe site  4. Every 4-6 hours:  If on nasal continuous positive airway pressure, respiratory therapy assess nares and determine need for appliance change or resting period.   8/29/2023 0416 by Francine Herbert RN  Outcome: Progressing  8/28/2023 2107 by Francine Herbert RN  Outcome: Progressing     Problem: ABCDS Injury Assessment  Goal: Absence of physical injury  8/29/2023 0416 by Francine Herbert RN  Outcome: Progressing  8/28/2023 2107 by Francine Herbert RN  Outcome: Progressing     Problem: Neurosensory - Adult  Goal: Achieves stable or improved neurological status  8/29/2023 0416 by Francine Herbert RN  Outcome: Progressing  8/28/2023 2107 by Francine Herbert RN  Outcome: Progressing  Goal: Absence of seizures  8/29/2023 0416 by Francine Herbert RN  Outcome: Progressing  8/28/2023 2107 by Otoniel Page RN  Outcome: Progressing  Goal: Remains free of injury related to seizures activity  8/29/2023 0416 by Otoniel Page RN  Outcome: Progressing  8/28/2023 2107 by Otoniel Page RN  Outcome: Progressing  Goal: Achieves maximal functionality and self care  8/29/2023 0416 by Otoniel Page RN  Outcome: Progressing  8/28/2023 2107 by Otoniel Page RN  Outcome: Progressing     Problem: Respiratory - Adult  Goal: Achieves optimal ventilation and oxygenation  8/29/2023 0416 by Otoniel Page RN  Outcome: Progressing  8/28/2023 2107 by Otoniel Page RN  Outcome: Progressing     Problem: Cardiovascular - Adult  Goal: Maintains optimal cardiac output and hemodynamic stability  8/29/2023 0416 by Otoniel Page RN  Outcome: Progressing  8/28/2023 2107 by Otoniel Page RN  Outcome: Progressing  Goal: Absence of cardiac dysrhythmias or at baseline  8/29/2023 0416 by Otoniel Page RN  Outcome: Progressing  8/28/2023 2107 by Otoniel Page RN  Outcome: Progressing     Problem: Skin/Tissue Integrity - Adult  Goal: Skin integrity remains intact  8/29/2023 0416 by Otoniel Page RN  Outcome: Progressing  8/28/2023 2107 by Otoniel Page RN  Outcome: Progressing  Goal: Incisions, wounds, or drain sites healing without S/S of infection  8/29/2023 0416 by Otoniel Page RN  Outcome: Progressing  8/28/2023 2107 by Otoniel Page RN  Outcome: Progressing  Goal: Oral mucous membranes remain intact  8/29/2023 0416 by Otoniel Page RN  Outcome: Progressing  8/28/2023 2107 by tOoniel Page RN  Outcome: Progressing     Problem: Nutrition Deficit:  Goal: Optimize nutritional status  8/29/2023 0416 by Otoniel Page RN  Outcome: Progressing  8/28/2023 2107 by Otoniel Page RN  Outcome: Progressing

## 2023-08-30 ENCOUNTER — APPOINTMENT (OUTPATIENT)
Dept: GENERAL RADIOLOGY | Age: 68
DRG: 870 | End: 2023-08-30
Payer: MEDICARE

## 2023-08-30 LAB
AADO2: 313.1 MMHG
ALBUMIN SERPL-MCNC: 3.4 G/DL (ref 3.5–5.2)
ALP SERPL-CCNC: 130 U/L (ref 35–104)
ALT SERPL-CCNC: 49 U/L (ref 0–32)
ANION GAP SERPL CALCULATED.3IONS-SCNC: 15 MMOL/L (ref 7–16)
AST SERPL-CCNC: 26 U/L (ref 0–31)
B.E.: 2.8 MMOL/L (ref -3–3)
B.E.: 3.9 MMOL/L (ref -3–3)
BASOPHILS # BLD: 0 K/UL (ref 0–0.2)
BASOPHILS NFR BLD: 0 % (ref 0–2)
BILIRUB SERPL-MCNC: 0.4 MG/DL (ref 0–1.2)
BUN SERPL-MCNC: 23 MG/DL (ref 6–23)
CALCIUM SERPL-MCNC: 9.2 MG/DL (ref 8.6–10.2)
CHLORIDE SERPL-SCNC: 101 MMOL/L (ref 98–107)
CO2 SERPL-SCNC: 25 MMOL/L (ref 22–29)
COHB: 0.7 % (ref 0–1.5)
COHB: 1.1 % (ref 0–1.5)
COMMENT: ABNORMAL
CREAT SERPL-MCNC: 1.2 MG/DL (ref 0.5–1)
CRITICAL: ABNORMAL
CRITICAL: ABNORMAL
DATE ANALYZED: ABNORMAL
DATE ANALYZED: ABNORMAL
DATE OF COLLECTION: ABNORMAL
DATE OF COLLECTION: ABNORMAL
EOSINOPHIL # BLD: 0 K/UL (ref 0.05–0.5)
EOSINOPHILS RELATIVE PERCENT: 0 % (ref 0–6)
ERYTHROCYTE [DISTWIDTH] IN BLOOD BY AUTOMATED COUNT: 18.1 % (ref 11.5–15)
FIO2: 65 %
GFR SERPL CREATININE-BSD FRML MDRD: 51 ML/MIN/1.73M2
GLUCOSE BLD-MCNC: 124 MG/DL (ref 74–99)
GLUCOSE SERPL-MCNC: 106 MG/DL (ref 74–99)
HCO3: 26.9 MMOL/L (ref 22–26)
HCO3: 29.6 MMOL/L (ref 22–26)
HCT VFR BLD AUTO: 26.5 % (ref 34–48)
HGB BLD-MCNC: 8.2 G/DL (ref 11.5–15.5)
HHB: 3.7 % (ref 0–5)
HHB: 53.2 % (ref 0–5)
LAB: ABNORMAL
LAB: ABNORMAL
LYMPHOCYTES NFR BLD: 0.11 K/UL (ref 1.5–4)
LYMPHOCYTES RELATIVE PERCENT: 2 % (ref 20–42)
Lab: ABNORMAL
Lab: ABNORMAL
MAGNESIUM SERPL-MCNC: 2.1 MG/DL (ref 1.6–2.6)
MCH RBC QN AUTO: 26.3 PG (ref 26–35)
MCHC RBC AUTO-ENTMCNC: 30.9 G/DL (ref 32–34.5)
MCV RBC AUTO: 84.9 FL (ref 80–99.9)
METHB: 0.1 % (ref 0–1.5)
METHB: 0.4 % (ref 0–1.5)
MICROORGANISM SPEC CULT: ABNORMAL
MODE: ABNORMAL
MODE: ABNORMAL
MONOCYTES NFR BLD: 0.32 K/UL (ref 0.1–0.95)
MONOCYTES NFR BLD: 5 % (ref 2–12)
NEUTROPHILS NFR BLD: 93 % (ref 43–80)
NEUTS SEG NFR BLD: 5.77 K/UL (ref 1.8–7.3)
NUCLEATED RED BLOOD CELLS: 1 PER 100 WBC
O2 CONTENT: 13.8 ML/DL
O2 CONTENT: 6.9 ML/DL
O2 SATURATION: 46.2 % (ref 92–98.5)
O2 SATURATION: 96.3 % (ref 92–98.5)
O2HB: 45.6 % (ref 94–97)
O2HB: 95.2 % (ref 94–97)
OPERATOR ID: 1868
OPERATOR ID: ABNORMAL
OSMOLALITY UR: 442 MOSM/KG (ref 300–900)
PATIENT TEMP: 37 C
PATIENT TEMP: 37 C
PCO2: 39 MMHG (ref 35–45)
PCO2: 50.2 MMHG (ref 35–45)
PFO2: 1.41 MMHG/%
PH BLOOD GAS: 7.39 (ref 7.35–7.45)
PH BLOOD GAS: 7.46 (ref 7.35–7.45)
PHOSPHATE SERPL-MCNC: 3.8 MG/DL (ref 2.5–4.5)
PLATELET # BLD AUTO: 168 K/UL (ref 130–450)
PMV BLD AUTO: 12.2 FL (ref 7–12)
PO2: 28.7 MMHG (ref 75–100)
PO2: 91.7 MMHG (ref 75–100)
POTASSIUM SERPL-SCNC: 3.4 MMOL/L (ref 3.5–5)
PROT SERPL-MCNC: 6.2 G/DL (ref 6.4–8.3)
RBC # BLD AUTO: 3.12 M/UL (ref 3.5–5.5)
RBC # BLD: ABNORMAL 10*6/UL
RI(T): 3.41
SODIUM SERPL-SCNC: 141 MMOL/L (ref 132–146)
SOURCE, BLOOD GAS: ABNORMAL
SOURCE, BLOOD GAS: ABNORMAL
SPECIMEN DESCRIPTION: ABNORMAL
THB: 10.2 G/DL (ref 11.5–16.5)
THB: 10.7 G/DL (ref 11.5–16.5)
TIME ANALYZED: 1240
TIME ANALYZED: 1256
WBC OTHER # BLD: 6.2 K/UL (ref 4.5–11.5)

## 2023-08-30 PROCEDURE — 6360000002 HC RX W HCPCS

## 2023-08-30 PROCEDURE — 51798 US URINE CAPACITY MEASURE: CPT

## 2023-08-30 PROCEDURE — 94660 CPAP INITIATION&MGMT: CPT

## 2023-08-30 PROCEDURE — 2580000003 HC RX 258: Performed by: INTERNAL MEDICINE

## 2023-08-30 PROCEDURE — 2700000000 HC OXYGEN THERAPY PER DAY

## 2023-08-30 PROCEDURE — 6370000000 HC RX 637 (ALT 250 FOR IP)

## 2023-08-30 PROCEDURE — 2580000003 HC RX 258

## 2023-08-30 PROCEDURE — 6370000000 HC RX 637 (ALT 250 FOR IP): Performed by: INTERNAL MEDICINE

## 2023-08-30 PROCEDURE — 83735 ASSAY OF MAGNESIUM: CPT

## 2023-08-30 PROCEDURE — 93005 ELECTROCARDIOGRAM TRACING: CPT | Performed by: STUDENT IN AN ORGANIZED HEALTH CARE EDUCATION/TRAINING PROGRAM

## 2023-08-30 PROCEDURE — 94640 AIRWAY INHALATION TREATMENT: CPT

## 2023-08-30 PROCEDURE — 80053 COMPREHEN METABOLIC PANEL: CPT

## 2023-08-30 PROCEDURE — C9113 INJ PANTOPRAZOLE SODIUM, VIA: HCPCS

## 2023-08-30 PROCEDURE — A4216 STERILE WATER/SALINE, 10 ML: HCPCS

## 2023-08-30 PROCEDURE — 2500000003 HC RX 250 WO HCPCS: Performed by: INTERNAL MEDICINE

## 2023-08-30 PROCEDURE — 2060000000 HC ICU INTERMEDIATE R&B

## 2023-08-30 PROCEDURE — 82962 GLUCOSE BLOOD TEST: CPT

## 2023-08-30 PROCEDURE — 85025 COMPLETE CBC W/AUTO DIFF WBC: CPT

## 2023-08-30 PROCEDURE — 99233 SBSQ HOSP IP/OBS HIGH 50: CPT | Performed by: INTERNAL MEDICINE

## 2023-08-30 PROCEDURE — 71045 X-RAY EXAM CHEST 1 VIEW: CPT

## 2023-08-30 PROCEDURE — 6360000002 HC RX W HCPCS: Performed by: INTERNAL MEDICINE

## 2023-08-30 PROCEDURE — 99232 SBSQ HOSP IP/OBS MODERATE 35: CPT | Performed by: FAMILY MEDICINE

## 2023-08-30 PROCEDURE — 6360000002 HC RX W HCPCS: Performed by: NURSE PRACTITIONER

## 2023-08-30 PROCEDURE — 82805 BLOOD GASES W/O2 SATURATION: CPT

## 2023-08-30 PROCEDURE — 84100 ASSAY OF PHOSPHORUS: CPT

## 2023-08-30 RX ORDER — HYDRALAZINE HYDROCHLORIDE 20 MG/ML
10 INJECTION INTRAMUSCULAR; INTRAVENOUS EVERY 6 HOURS PRN
Status: DISCONTINUED | OUTPATIENT
Start: 2023-08-30 | End: 2023-09-13 | Stop reason: HOSPADM

## 2023-08-30 RX ORDER — LIDOCAINE 4 G/G
1 PATCH TOPICAL DAILY
Status: DISCONTINUED | OUTPATIENT
Start: 2023-08-31 | End: 2023-09-13 | Stop reason: HOSPADM

## 2023-08-30 RX ORDER — IPRATROPIUM BROMIDE AND ALBUTEROL SULFATE 2.5; .5 MG/3ML; MG/3ML
1 SOLUTION RESPIRATORY (INHALATION)
Status: DISCONTINUED | OUTPATIENT
Start: 2023-08-30 | End: 2023-09-13 | Stop reason: HOSPADM

## 2023-08-30 RX ORDER — METOPROLOL TARTRATE 5 MG/5ML
2.5 INJECTION INTRAVENOUS EVERY 6 HOURS
Status: DISCONTINUED | OUTPATIENT
Start: 2023-08-30 | End: 2023-09-05

## 2023-08-30 RX ORDER — NITROGLYCERIN 0.4 MG/1
TABLET SUBLINGUAL
Status: COMPLETED
Start: 2023-08-30 | End: 2023-08-30

## 2023-08-30 RX ORDER — POTASSIUM CHLORIDE 29.8 MG/ML
40 INJECTION INTRAVENOUS ONCE
Status: COMPLETED | OUTPATIENT
Start: 2023-08-30 | End: 2023-08-30

## 2023-08-30 RX ORDER — FENTANYL CITRATE 50 UG/ML
25 INJECTION, SOLUTION INTRAMUSCULAR; INTRAVENOUS
Status: ACTIVE | OUTPATIENT
Start: 2023-08-30 | End: 2023-08-31

## 2023-08-30 RX ADMIN — BUDESONIDE INHALATION 500 MCG: 0.5 SUSPENSION RESPIRATORY (INHALATION) at 09:35

## 2023-08-30 RX ADMIN — POTASSIUM CHLORIDE 40 MEQ: 29.8 INJECTION, SOLUTION INTRAVENOUS at 09:12

## 2023-08-30 RX ADMIN — FUROSEMIDE 40 MG: 10 INJECTION, SOLUTION INTRAMUSCULAR; INTRAVENOUS at 09:04

## 2023-08-30 RX ADMIN — DEXAMETHASONE SODIUM PHOSPHATE 6 MG: 4 INJECTION, SOLUTION INTRA-ARTICULAR; INTRALESIONAL; INTRAMUSCULAR; INTRAVENOUS; SOFT TISSUE at 09:04

## 2023-08-30 RX ADMIN — LEVOTHYROXINE SODIUM 75 MCG: 0.07 TABLET ORAL at 06:51

## 2023-08-30 RX ADMIN — HYDRALAZINE HYDROCHLORIDE 10 MG: 20 INJECTION, SOLUTION INTRAMUSCULAR; INTRAVENOUS at 18:44

## 2023-08-30 RX ADMIN — NITROGLYCERIN: 0.4 TABLET, ORALLY DISINTEGRATING SUBLINGUAL at 18:41

## 2023-08-30 RX ADMIN — Medication 250 MG: at 09:06

## 2023-08-30 RX ADMIN — METOPROLOL TARTRATE 25 MG: 25 TABLET, FILM COATED ORAL at 09:05

## 2023-08-30 RX ADMIN — LEVETIRACETAM 500 MG: 100 INJECTION INTRAVENOUS at 20:00

## 2023-08-30 RX ADMIN — METOPROLOL TARTRATE 2.5 MG: 5 INJECTION, SOLUTION INTRAVENOUS at 20:54

## 2023-08-30 RX ADMIN — DEXTROSE MONOHYDRATE: 50 INJECTION, SOLUTION INTRAVENOUS at 00:56

## 2023-08-30 RX ADMIN — LEVETIRACETAM 500 MG: 100 INJECTION INTRAVENOUS at 09:05

## 2023-08-30 RX ADMIN — HEPARIN 300 UNITS: 100 SYRINGE at 09:05

## 2023-08-30 RX ADMIN — ARFORMOTEROL TARTRATE 15 MCG: 15 SOLUTION RESPIRATORY (INHALATION) at 09:35

## 2023-08-30 RX ADMIN — Medication 10 ML: at 20:00

## 2023-08-30 RX ADMIN — ARFORMOTEROL TARTRATE 15 MCG: 15 SOLUTION RESPIRATORY (INHALATION) at 19:58

## 2023-08-30 RX ADMIN — HYDRALAZINE HYDROCHLORIDE 25 MG: 25 TABLET, FILM COATED ORAL at 09:03

## 2023-08-30 RX ADMIN — BUDESONIDE INHALATION 500 MCG: 0.5 SUSPENSION RESPIRATORY (INHALATION) at 19:57

## 2023-08-30 RX ADMIN — IPRATROPIUM BROMIDE AND ALBUTEROL SULFATE 1 DOSE: .5; 2.5 SOLUTION RESPIRATORY (INHALATION) at 19:57

## 2023-08-30 RX ADMIN — ACETAMINOPHEN 650 MG: 325 TABLET ORAL at 18:40

## 2023-08-30 RX ADMIN — SODIUM CHLORIDE, PRESERVATIVE FREE 40 MG: 5 INJECTION INTRAVENOUS at 09:06

## 2023-08-30 ASSESSMENT — PAIN SCALES - GENERAL
PAINLEVEL_OUTOF10: 0
PAINLEVEL_OUTOF10: 5
PAINLEVEL_OUTOF10: 6
PAINLEVEL_OUTOF10: 0
PAINLEVEL_OUTOF10: 5
PAINLEVEL_OUTOF10: 0

## 2023-08-30 ASSESSMENT — PAIN SCALES - WONG BAKER

## 2023-08-30 ASSESSMENT — PAIN DESCRIPTION - PAIN TYPE: TYPE: CHRONIC PAIN

## 2023-08-30 ASSESSMENT — PAIN DESCRIPTION - ONSET: ONSET: ON-GOING

## 2023-08-30 ASSESSMENT — PAIN DESCRIPTION - LOCATION
LOCATION: GENERALIZED
LOCATION: CHEST
LOCATION: CHEST

## 2023-08-30 ASSESSMENT — PAIN DESCRIPTION - DESCRIPTORS
DESCRIPTORS: ACHING
DESCRIPTORS: PATIENT UNABLE TO DESCRIBE
DESCRIPTORS: ACHING

## 2023-08-30 ASSESSMENT — PAIN DESCRIPTION - FREQUENCY: FREQUENCY: INTERMITTENT

## 2023-08-30 ASSESSMENT — PAIN DESCRIPTION - ORIENTATION
ORIENTATION: MID

## 2023-08-30 NOTE — PROGRESS NOTES
Date: 8/29/2023    Time: 9:25 PM    Patient Placed On BIPAP/CPAP/ Non-Invasive Ventilation? Pt remains on BIPAP from previous shift    If no must comment. Facial area red/color change? No           If YES are Blister/Lesion present? No   If yes must notify nursing staff  BIPAP/CPAP skin barrier?   Yes    Skin barrier type:mepilexlite       Comments:        Cielo Bentley RCP

## 2023-08-30 NOTE — PROGRESS NOTES
Physical Therapy    PT consult to evaluate/treat received and appreciated. Pt chart reviewed and evaluation attempted. Pt is currently with questionable acuity thoracic fractures (chronic T7, T10 per chart but acute (?) T12). Will await activity clearance prior to OOB. Will check back as able. Thank you.         Karishma Petit, PT, DPT   [id number]

## 2023-08-30 NOTE — FLOWSHEET NOTE
8/30/2023 Called Dr Rani Valentino for non specific chest pain. EKG obtained with no infarct indication noted. Nitro and hydralazine  ordered .

## 2023-08-30 NOTE — PROGRESS NOTES
OT consult received and appreciated. Chart reviewed. Will hold evaluation pending clearance for OOB activity d/t compression fracture of T12. Will evaluate at a later time. Thank you.  Ko Salvador, OTR/L # [id number]

## 2023-08-30 NOTE — PROGRESS NOTES
[facility] Inpatient   Resident Progress Note    S:  Hospital day: 11    Brief Synopsis: Prabha Feldman is a 78 y.o. female with a past medical history of pulmonary sarcoidosis, atrial fibrillation on Eliquis, pulmonary hypertension, diabetes insipidus, hypothyroidism, hypertension, chronic kidney disease stage IIIa and combined systolic/diastolic heart failure who presents to the emergency department for shortness of breath. Patient was recently discharged from [facility] (08/18/23) due to acute hypoxic respiratory failure. Patient has had numerous hospital admissions for the same complaint. Hypotensive, tachycardic and hypoxic at presentation. She did decompensate and ended up in PEA. 2 rounds of CPR were performed and ROSC achieved. Patient admitted to the intensive care unit for acute hypoxic respiratory failure requiring intubation and vasopressors. Overnight/Interim  Patient was seen in the ICU and examined this morning. Patient is extubated on BiPaP 40% FiO2. She says she doesn't feel any pain  Patient was alert and pleasant ,able to follow simple commands.   No overnight events      Cont meds:    dextrose 100 mL/hr at 08/30/23 0056    sodium chloride      sodium chloride Stopped (08/29/23 1449)    dextrose       Scheduled meds:    potassium chloride  40 mEq IntraVENous Once    metoprolol tartrate  25 mg Oral BID    furosemide  40 mg IntraVENous Daily    dexamethasone  6 mg IntraVENous Daily    [START ON 9/3/2023] predniSONE  5 mg Oral Daily    levETIRAcetam  500 mg IntraVENous Q12H    potassium & sodium phosphates  1 packet Per NG tube q8h    [Held by provider] levETIRAcetam  500 mg Oral BID    lidocaine PF  5 mL IntraDERmal Once    sodium chloride flush  5-40 mL IntraVENous 2 times per day    heparin flush  3 mL IntraVENous 2 times per day    amiodarone  200 mg Oral Daily    budesonide  500 mcg Nebulization BID RT    arformoterol tartrate  15 mcg Nebulization BID RT    [Held by consulted for umbilical hernia with suspecting cecal volvulus - Plan to monitor bowel movements with no acute surgical intervention at this time. Chronic microcytic anemia  History of iron deficiency  Leukopenia  Transfused 2 units RBC ON 8/23. monitor H/H and transfused if hemoglobin < 7  Consider restarting ferrous sulfate, currently holding it due to constipation  Management per ICU     Hypoglycemia  Hypoglycemia protocol     Paroxysmal atrial fibrillation  Currently holding Toprol XL due low blood pressure  Eliquis held  Continue amiodarone 200 mg daily        PT/OT evaluation: To be consulted when patient leaves ICU  DVT/GI prophylaxis: SCD/protonix  Diet: N.p.o.  Full code  Disposition: ICU-level care and management    consulted for placement at discharge. Recommend facility due to multiple hospital admissions.       Electronically signed by Olivia Friedman MD on 8/30/2023 at 8:05 AM  This case was discussed with attending physician Dr. Boles Herb

## 2023-08-30 NOTE — CONSULTS
Reddy Camacho M.D.,[facility]  Arianne Browning D.O., F.A.C.O.I., Alina Sapp M.D. Asuncion Teixeira M.D. Derrick Brown D.O. Patient:  Sherrie Calloway 78 y.o. female MRN: [id number]     Date of Service: 8/30/2023      PULMONARY CONSULTATION    Reason for Consultation: resp failure  Referring Physician: Jeanna Ha MD    Communication with the referring physician will be sent via the electronic medical record. Chief Complaint: cardiac arrest    CODE STATUS: Full Code    SUBJECTIVE:  HPI:  Sherrie Calloway is a 78 y.o. AA female who we are asked to see for  hypoxia. Her past medical history includes-  Pulmonary Sarcardoisis (dx 1998), COPD (on 6-8L NC), DM II, Diabetes Insipidus, HTN, Depression, Atrial fibrillation, Hypothyroidism, GERD, hx Ischemic Colitis (s/p colectomy) and CKD who presents from OSH for further management of acute hypoxic respiratory failure and suspected/worsening pulmonary HTN. She underwent repeat right heart catheterization at [facility] on 6/22/2023 which found combined pre and postcapillary pH with borderline preserved cardiac index. Negative nitric oxide response. She was found to have a right IJ thrombus. M PAP decreased from 56 mmHg to 50 mmHg in response to nitric oxide. Cardiac index paradoxically decreased from 2.5 L to 2.0. PVR increased 7.80 POSEY to 9.41 POSEY, while the PAWP decreased from 25 to 18 mmHg. The worsening PVR was due to combination of reduced CO and reduction in PAWP. Recommendations at [facility] was to continue ongoing optimization of heart failure with current diuretic regimen and low-sodium diet. BP control. Considering initiation of PAH therapy once volume status is optimized given significant precapillary component. Patient with IJ thrombus deemed poor candidate for anticoagulation given severe bleeding history and blood loss anemia.   She had been in [facility] on 6/17-6/28 for evaluation of severe pulmonary hypertension and treatment of right upper lobe and placed on BiPap eventually weaning to AirVo with BiPap and night. Nicole Camacho was seen today in ICU on AirVo 60 liters, 64% FiO2, SpO2 was 100% however she was actively having dyspnea and was working pretty hard to breathe. Lungs were very diminished on exam.  She was alert and oriented but stated she was not aware she had arrested. We had the ICU nurse obtain ABGs due to her increased work of breathing which were sufficient with pH 7.456, pCO2 39, pO2 91.7, HCO3 26.9, SpO2 96.3.       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/2007    Backache     Benign hypertension     CHF (congestive heart failure) ([facility])     Chronic back pain     Chronic kidney disease     stage 3    Chronic pain disorder     Chronic, continuous use of opioids     Compression fracture of thoracic vertebra ([facility])     T10    COPD (chronic obstructive pulmonary disease) ([address])     Debility     Deformity of ankle and foot, acquired 01/31/2011    Depression     Diabetes insipidus ([address])     Diverticulitis     Dry eyes     Encephalopathy acute 05/05/2017    Gait disturbance     GERD (gastroesophageal reflux disease)     Hemorrhoids 01/13/2012    Hernia     History of blood transfusion approx 05/2017    History of Clostridium difficile     negative culture 12-15-15; resolved    Hyperlipidemia     Hypothyroidism     Ischemic colitis, enteritis, or enterocolitis ([address])     Long term (current) use of systemic steroids 07/10/2022    Long-term current use of steroids     Lumbar disc herniation     Myalgia and myositis, unspecified     Nephrosclerosis     Nonunion of fracture 02/22/2011    Osteoarthritis     severe    PAF (paroxysmal atrial fibrillation) ([facility])     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[facility])     PCWP

## 2023-08-30 NOTE — PLAN OF CARE
Problem: Chronic Conditions and Co-morbidities  Goal: Patient's chronic conditions and co-morbidity symptoms are monitored and maintained or improved  8/29/2023 2236 by Simi Lawrence RN  Outcome: Progressing     Problem: Discharge Planning  Goal: Discharge to home or other facility with appropriate resources  8/29/2023 2236 by Simi Lawrence RN  Outcome: Progressing     Problem: Safety - Adult  Goal: Free from fall injury  8/29/2023 2236 by Simi Lawrence RN  Outcome: Progressing     Problem: Pain  Goal: Verbalizes/displays adequate comfort level or baseline comfort level  8/29/2023 2236 by Simi Lawrence RN  Outcome: Progressing     Problem: ABCDS Injury Assessment  Goal: Absence of physical injury  8/29/2023 2236 by Simi Lawrence RN  Outcome: Progressing     Problem: Neurosensory - Adult  Goal: Achieves stable or improved neurological status  8/29/2023 2236 by Simi Lawrence RN  Outcome: Progressing     Problem: Neurosensory - Adult  Goal: Absence of seizures  8/29/2023 2236 by Simi Lawrence RN  Outcome: Progressing     Problem: Neurosensory - Adult  Goal: Remains free of injury related to seizures activity  8/29/2023 2236 by Simi Lawrence RN  Outcome: Progressing     Problem: Respiratory - Adult  Goal: Achieves optimal ventilation and oxygenation  8/29/2023 2236 by Simi Lawrence RN  Outcome: Progressing     Problem: Skin/Tissue Integrity - Adult  Goal: Skin integrity remains intact  8/29/2023 2236 by Simi Lawrence RN  Outcome: Progressing     Problem: Nutrition Deficit:  Goal: Optimize nutritional status  8/29/2023 2236 by Simi Lawrence RN  Outcome: Progressing

## 2023-08-30 NOTE — PROGRESS NOTES
[address] [facility]             Pulmonary & Critical Care Medicine                MICU Progress Note                 Written by: Martin Ya MD  Name: Gloria Mcgovern : 1955       Age: 78 y.o. MR/Act #    : [id number],  Billing  #    : [id number]   Admit Date: 2023  2:21 AM LOS: 11,   Hospital Day: 12 Room #      : 3038/5320-Z   PCP            : Srinivas Martinez MD,   Referred by: Shaye Almanza MD ICU Attending: MD Fredrick Weber date: 2023 12:03 PM   ICU Days:       7 Vent Days:    7 LOS: 11,                          Reason for ICU admission           Chief Complaint   Patient presents with    Shortness of Breath     (Started earlier last night, does wear home O2 on 6L)                          Brief HPI, Presentation & Synopsis                       73-year-old female with past medical history of acute on chronic respiratory failure, Pseudomonas pneumonia, stenotrophomonas pneumonia, pulmonary hypertension, pulmonary sarcoidosis IRMA on top of CKD, hyponatremia, anemia of chronic disease presented to the ER where she had PEA with cardiac arrest.  2 rounds of CPR and ROSC was achieved. She was intubated during the cardiac arrest.  Hypotension was noted and started on Levophed drip. CTA chest showed left-sided pneumonia. Admitted in ICU for septic shock, cardiac arrest and respiratory failure due to pneumonia.    =============================================    Gloria Mcgovern was seen and examined on 23    Patient doing well postextubation, alternating between BiPAP during naps and overnight and Airvo high flow oxygen. Creatinine trending up, chest x-ray improved -holding further Lasix    On amiodarone and metoprolol for atrial fibrillation, restarting Eliquis as hemoglobin has been stable and there is no evidence of any bleeding.     Transfer to telemetry.    =============================================    Active problem list of failure with hypoxia and hypercapnia -improving  Intubated during cardiac arrest  Paroxysmal atrial fibrillation on amiodarone and apixaban  Severe pulmonary hypertension. 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 Neurology consult requested. Keppra continued. MRI brain did not show any acute abnormality except for chronic ischemic changes. .      Cardiovascular: Cardiac arrest/off pressors    Diuresis as tolerated to maintain negative fluid balance  Patient is on amiodarone, hydralazine and metoprolol  Restarting Eliquis, monitor for bleed  Echocardiogram 8/29 with EF 60%, moderate to severe pulmonary hypertension with reduced RV systolic function    Abdomen/GI: CT abdomen with contrast did not show any mechanical obstruction, general surgery consult appreciated. Improving transaminitis noted. NAC Completed. Renal: Improvement in kidney functions noted with albumin. Nephrology following    MSK: No active issues   Skin: No active issues     Hematology: H&H stabilized with 2 units PRBC transfusion, continue to monitor CBC. ID: Pseudomonas is Zosyn resistance, considering IRMA. Zerbaxa 7-day course completed on 8/27/2023. ID consult appreciated. Endocrine: Monitor BS.   On steroids and Synthroid, DDAVP on hold    DVT/GI: Prophylaxis: Eliquis on hold, SCD and Protonix    Lines: Left brachial PICC line    Code Status: Full Code. Discussed with daughter Reginaldo Green by bedside. I reviewed the patients chart including labs and images with the patient. 35 Minutes of which greater than 50% was spent counseling or coordinating care. My signature verifies the accuracy and relevance of my note, including documentation of information that has been copy pasted/forward, note templates, and Fariha Delvin.     Kang Mejia MD MS  Pulmonary & [address] [facility], [address] [place] [address]

## 2023-08-30 NOTE — PLAN OF CARE
Problem: Chronic Conditions and Co-morbidities  Goal: Patient's chronic conditions and co-morbidity symptoms are monitored and maintained or improved  8/30/2023 0925 by Kian Jenkins RN  Outcome: Progressing  Flowsheets (Taken 8/30/2023 0815)  Care Plan - Patient's Chronic Conditions and Co-Morbidity Symptoms are Monitored and Maintained or Improved: Monitor and assess patient's chronic conditions and comorbid symptoms for stability, deterioration, or improvement  8/29/2023 2236 by Galen Johnson RN  Outcome: Progressing     Problem: Discharge Planning  Goal: Discharge to home or other facility with appropriate resources  8/30/2023 0925 by Kian Jenkins RN  Outcome: Progressing  Flowsheets (Taken 8/30/2023 0815)  Discharge to home or other facility with appropriate resources: Identify barriers to discharge with patient and caregiver  8/29/2023 2236 by Galen Johnson RN  Outcome: Progressing     Problem: Safety - Adult  Goal: Free from fall injury  8/30/2023 0925 by Kian Jenkins RN  Outcome: Progressing  8/29/2023 2236 by Galen Johnson RN  Outcome: Progressing     Problem: Pain  Goal: Verbalizes/displays adequate comfort level or baseline comfort level  8/30/2023 0925 by Kian Jenkins RN  Outcome: Progressing  8/29/2023 2236 by Galen Johnson RN  Outcome: Progressing     Problem: Skin/Tissue Integrity  Goal: Absence of new skin breakdown  Description: 1. Monitor for areas of redness and/or skin breakdown  2. Assess vascular access sites hourly  3. Every 4-6 hours minimum:  Change oxygen saturation probe site  4. Every 4-6 hours:  If on nasal continuous positive airway pressure, respiratory therapy assess nares and determine need for appliance change or resting period.   8/30/2023 1095 by Kian Jenkins RN  Outcome: Progressing  8/29/2023 2236 by Galen Johnson RN  Outcome: Progressing     Problem: ABCDS Injury Assessment  Goal: Absence of physical injury  8/30/2023 0925 by Kian Jenkins dysrhythmias or at baseline  Outcome: Progressing  Flowsheets (Taken 8/30/2023 0815)  Absence of cardiac dysrhythmias or at baseline: Monitor cardiac rate and rhythm     Problem: Skin/Tissue Integrity - Adult  Goal: Skin integrity remains intact  8/30/2023 0925 by Pj Frazier RN  Outcome: Progressing  Flowsheets  Taken 8/30/2023 0921  Skin Integrity Remains Intact: Monitor for areas of redness and/or skin breakdown  Taken 8/30/2023 0815  Skin Integrity Remains Intact: Monitor for areas of redness and/or skin breakdown  8/29/2023 2236 by Jb Ann RN  Outcome: Progressing  Goal: Incisions, wounds, or drain sites healing without S/S of infection  Outcome: Progressing  Flowsheets  Taken 8/30/2023 0921  Incisions, Wounds, or Drain Sites Healing Without Sign and Symptoms of Infection: ADMISSION and DAILY: Assess and document risk factors for pressure ulcer development  Taken 8/30/2023 0815  Incisions, Wounds, or Drain Sites Healing Without Sign and Symptoms of Infection: ADMISSION and DAILY: Assess and document risk factors for pressure ulcer development  Goal: Oral mucous membranes remain intact  Outcome: Progressing  Flowsheets  Taken 8/30/2023 1934  Oral Mucous Membranes Remain Intact: Assess oral mucosa and hygiene practices  Taken 8/30/2023 0815  Oral Mucous Membranes Remain Intact: Assess oral mucosa and hygiene practices     Problem: Nutrition Deficit:  Goal: Optimize nutritional status  8/30/2023 0925 by Pj Frazier RN  Outcome: Progressing  8/29/2023 2236 by Jb Ann RN  Outcome: Progressing

## 2023-08-31 ENCOUNTER — APPOINTMENT (OUTPATIENT)
Dept: GENERAL RADIOLOGY | Age: 68
DRG: 870 | End: 2023-08-31
Payer: MEDICARE

## 2023-08-31 LAB
ALBUMIN SERPL-MCNC: 4 G/DL (ref 3.5–5.2)
ALP SERPL-CCNC: 137 U/L (ref 35–104)
ALT SERPL-CCNC: 47 U/L (ref 0–32)
ANION GAP SERPL CALCULATED.3IONS-SCNC: 13 MMOL/L (ref 7–16)
AST SERPL-CCNC: 24 U/L (ref 0–31)
BASOPHILS # BLD: 0.01 K/UL (ref 0–0.2)
BASOPHILS NFR BLD: 0 % (ref 0–2)
BILIRUB SERPL-MCNC: 0.5 MG/DL (ref 0–1.2)
BUN SERPL-MCNC: 22 MG/DL (ref 6–23)
CALCIUM SERPL-MCNC: 9.6 MG/DL (ref 8.6–10.2)
CHLORIDE SERPL-SCNC: 103 MMOL/L (ref 98–107)
CO2 SERPL-SCNC: 26 MMOL/L (ref 22–29)
CREAT SERPL-MCNC: 1.1 MG/DL (ref 0.5–1)
EKG ATRIAL RATE: 84 BPM
EKG P AXIS: 54 DEGREES
EKG P-R INTERVAL: 134 MS
EKG Q-T INTERVAL: 412 MS
EKG QRS DURATION: 86 MS
EKG QTC CALCULATION (BAZETT): 486 MS
EKG R AXIS: 90 DEGREES
EKG T AXIS: 10 DEGREES
EKG VENTRICULAR RATE: 84 BPM
EOSINOPHIL # BLD: 0 K/UL (ref 0.05–0.5)
EOSINOPHILS RELATIVE PERCENT: 0 % (ref 0–6)
ERYTHROCYTE [DISTWIDTH] IN BLOOD BY AUTOMATED COUNT: 18 % (ref 11.5–15)
GFR SERPL CREATININE-BSD FRML MDRD: 55 ML/MIN/1.73M2
GLUCOSE SERPL-MCNC: 82 MG/DL (ref 74–99)
HCT VFR BLD AUTO: 28.7 % (ref 34–48)
HGB BLD-MCNC: 8.8 G/DL (ref 11.5–15.5)
IMM GRANULOCYTES # BLD AUTO: 0.06 K/UL (ref 0–0.58)
IMM GRANULOCYTES NFR BLD: 1 % (ref 0–5)
LYMPHOCYTES NFR BLD: 0.27 K/UL (ref 1.5–4)
LYMPHOCYTES RELATIVE PERCENT: 4 % (ref 20–42)
MAGNESIUM SERPL-MCNC: 2.2 MG/DL (ref 1.6–2.6)
MCH RBC QN AUTO: 25.9 PG (ref 26–35)
MCHC RBC AUTO-ENTMCNC: 30.7 G/DL (ref 32–34.5)
MCV RBC AUTO: 84.4 FL (ref 80–99.9)
MONOCYTES NFR BLD: 0.57 K/UL (ref 0.1–0.95)
MONOCYTES NFR BLD: 9 % (ref 2–12)
NEUTROPHILS NFR BLD: 86 % (ref 43–80)
NEUTS SEG NFR BLD: 5.43 K/UL (ref 1.8–7.3)
PHOSPHATE SERPL-MCNC: 3.5 MG/DL (ref 2.5–4.5)
PLATELET # BLD AUTO: 187 K/UL (ref 130–450)
PMV BLD AUTO: 11.3 FL (ref 7–12)
POTASSIUM SERPL-SCNC: 3.8 MMOL/L (ref 3.5–5)
PROT SERPL-MCNC: 6.8 G/DL (ref 6.4–8.3)
RBC # BLD AUTO: 3.4 M/UL (ref 3.5–5.5)
SODIUM SERPL-SCNC: 142 MMOL/L (ref 132–146)
TROPONIN I SERPL HS-MCNC: 49 NG/L (ref 0–9)
WBC OTHER # BLD: 6.3 K/UL (ref 4.5–11.5)

## 2023-08-31 PROCEDURE — 2580000003 HC RX 258

## 2023-08-31 PROCEDURE — 80053 COMPREHEN METABOLIC PANEL: CPT

## 2023-08-31 PROCEDURE — 2500000003 HC RX 250 WO HCPCS: Performed by: INTERNAL MEDICINE

## 2023-08-31 PROCEDURE — 6360000002 HC RX W HCPCS

## 2023-08-31 PROCEDURE — 6360000002 HC RX W HCPCS: Performed by: INTERNAL MEDICINE

## 2023-08-31 PROCEDURE — 6370000000 HC RX 637 (ALT 250 FOR IP): Performed by: INTERNAL MEDICINE

## 2023-08-31 PROCEDURE — 84484 ASSAY OF TROPONIN QUANT: CPT

## 2023-08-31 PROCEDURE — 36415 COLL VENOUS BLD VENIPUNCTURE: CPT

## 2023-08-31 PROCEDURE — 99232 SBSQ HOSP IP/OBS MODERATE 35: CPT

## 2023-08-31 PROCEDURE — 94640 AIRWAY INHALATION TREATMENT: CPT

## 2023-08-31 PROCEDURE — 71045 X-RAY EXAM CHEST 1 VIEW: CPT

## 2023-08-31 PROCEDURE — A4216 STERILE WATER/SALINE, 10 ML: HCPCS

## 2023-08-31 PROCEDURE — 94660 CPAP INITIATION&MGMT: CPT

## 2023-08-31 PROCEDURE — 2060000000 HC ICU INTERMEDIATE R&B

## 2023-08-31 PROCEDURE — 84100 ASSAY OF PHOSPHORUS: CPT

## 2023-08-31 PROCEDURE — 6370000000 HC RX 637 (ALT 250 FOR IP)

## 2023-08-31 PROCEDURE — C9113 INJ PANTOPRAZOLE SODIUM, VIA: HCPCS

## 2023-08-31 PROCEDURE — 99233 SBSQ HOSP IP/OBS HIGH 50: CPT | Performed by: FAMILY MEDICINE

## 2023-08-31 PROCEDURE — 85025 COMPLETE CBC W/AUTO DIFF WBC: CPT

## 2023-08-31 PROCEDURE — 93010 ELECTROCARDIOGRAM REPORT: CPT | Performed by: INTERNAL MEDICINE

## 2023-08-31 PROCEDURE — 2700000000 HC OXYGEN THERAPY PER DAY

## 2023-08-31 PROCEDURE — 93005 ELECTROCARDIOGRAM TRACING: CPT

## 2023-08-31 PROCEDURE — 83735 ASSAY OF MAGNESIUM: CPT

## 2023-08-31 RX ORDER — FUROSEMIDE 10 MG/ML
40 INJECTION INTRAMUSCULAR; INTRAVENOUS ONCE
Status: COMPLETED | OUTPATIENT
Start: 2023-08-31 | End: 2023-08-31

## 2023-08-31 RX ORDER — ASPIRIN 81 MG/1
TABLET, CHEWABLE ORAL
Status: DISPENSED
Start: 2023-08-31 | End: 2023-08-31

## 2023-08-31 RX ORDER — DEXTROSE AND SODIUM CHLORIDE 5; .2 G/100ML; G/100ML
INJECTION, SOLUTION INTRAVENOUS CONTINUOUS
Status: DISCONTINUED | OUTPATIENT
Start: 2023-08-31 | End: 2023-08-31

## 2023-08-31 RX ADMIN — ARFORMOTEROL TARTRATE 15 MCG: 15 SOLUTION RESPIRATORY (INHALATION) at 07:47

## 2023-08-31 RX ADMIN — Medication 10 ML: at 11:51

## 2023-08-31 RX ADMIN — HEPARIN 300 UNITS: 100 SYRINGE at 23:27

## 2023-08-31 RX ADMIN — HYDRALAZINE HYDROCHLORIDE 25 MG: 25 TABLET, FILM COATED ORAL at 11:47

## 2023-08-31 RX ADMIN — HEPARIN 300 UNITS: 100 SYRINGE at 11:49

## 2023-08-31 RX ADMIN — LEVOTHYROXINE SODIUM 75 MCG: 0.07 TABLET ORAL at 11:48

## 2023-08-31 RX ADMIN — APIXABAN 5 MG: 5 TABLET, FILM COATED ORAL at 11:47

## 2023-08-31 RX ADMIN — AMIODARONE HYDROCHLORIDE 200 MG: 200 TABLET ORAL at 11:48

## 2023-08-31 RX ADMIN — BUDESONIDE INHALATION 500 MCG: 0.5 SUSPENSION RESPIRATORY (INHALATION) at 07:47

## 2023-08-31 RX ADMIN — DEXAMETHASONE SODIUM PHOSPHATE 6 MG: 4 INJECTION, SOLUTION INTRA-ARTICULAR; INTRALESIONAL; INTRAMUSCULAR; INTRAVENOUS; SOFT TISSUE at 09:00

## 2023-08-31 RX ADMIN — Medication 10 ML: at 20:58

## 2023-08-31 RX ADMIN — IPRATROPIUM BROMIDE AND ALBUTEROL SULFATE 1 DOSE: .5; 2.5 SOLUTION RESPIRATORY (INHALATION) at 11:17

## 2023-08-31 RX ADMIN — BUDESONIDE INHALATION 500 MCG: 0.5 SUSPENSION RESPIRATORY (INHALATION) at 19:42

## 2023-08-31 RX ADMIN — POTASSIUM CHLORIDE: 2 INJECTION, SOLUTION, CONCENTRATE INTRAVENOUS at 14:59

## 2023-08-31 RX ADMIN — IPRATROPIUM BROMIDE AND ALBUTEROL SULFATE 1 DOSE: .5; 2.5 SOLUTION RESPIRATORY (INHALATION) at 07:47

## 2023-08-31 RX ADMIN — METOPROLOL TARTRATE 2.5 MG: 5 INJECTION, SOLUTION INTRAVENOUS at 02:35

## 2023-08-31 RX ADMIN — Medication 10 ML: at 11:50

## 2023-08-31 RX ADMIN — LEVETIRACETAM 500 MG: 100 INJECTION INTRAVENOUS at 22:56

## 2023-08-31 RX ADMIN — FUROSEMIDE 40 MG: 10 INJECTION, SOLUTION INTRAMUSCULAR; INTRAVENOUS at 13:56

## 2023-08-31 RX ADMIN — ARFORMOTEROL TARTRATE 15 MCG: 15 SOLUTION RESPIRATORY (INHALATION) at 19:42

## 2023-08-31 RX ADMIN — Medication 250 MG: at 11:47

## 2023-08-31 RX ADMIN — IPRATROPIUM BROMIDE AND ALBUTEROL SULFATE 1 DOSE: .5; 2.5 SOLUTION RESPIRATORY (INHALATION) at 16:07

## 2023-08-31 RX ADMIN — METOPROLOL TARTRATE 2.5 MG: 5 INJECTION, SOLUTION INTRAVENOUS at 11:45

## 2023-08-31 RX ADMIN — IPRATROPIUM BROMIDE AND ALBUTEROL SULFATE 1 DOSE: .5; 2.5 SOLUTION RESPIRATORY (INHALATION) at 19:42

## 2023-08-31 RX ADMIN — LEVETIRACETAM 500 MG: 100 INJECTION INTRAVENOUS at 11:47

## 2023-08-31 RX ADMIN — METOPROLOL TARTRATE 2.5 MG: 5 INJECTION, SOLUTION INTRAVENOUS at 18:55

## 2023-08-31 RX ADMIN — SODIUM CHLORIDE, PRESERVATIVE FREE 40 MG: 5 INJECTION INTRAVENOUS at 11:46

## 2023-08-31 ASSESSMENT — PAIN SCALES - WONG BAKER
WONGBAKER_NUMERICALRESPONSE: 0

## 2023-08-31 ASSESSMENT — PAIN SCALES - GENERAL
PAINLEVEL_OUTOF10: 0

## 2023-08-31 NOTE — PLAN OF CARE
Problem: Chronic Conditions and Co-morbidities  Goal: Patient's chronic conditions and co-morbidity symptoms are monitored and maintained or improved  8/31/2023 1717 by Bruce Olsen RN  Outcome: Progressing  8/31/2023 1142 by Yasmeen Mcduffie RN  Outcome: Progressing

## 2023-08-31 NOTE — PROCEDURES
Date: 8/30/2023    Time: 9:59 PM    Patient Placed On BIPAP/CPAP/ Non-Invasive Ventilation? Yes    If no must comment. Facial area red/color change? No           If YES are Blister/Lesion present? No   If yes must notify nursing staff  BIPAP/CPAP skin barrier? Yes    Skin barrier type:mepilexlite       Comments: Patient placed on AVAPS per HS.         [employer], RCP

## 2023-08-31 NOTE — CONSULTS
[address] [facility]   Inpatient CHF Nurse Navigator Consult          Leobardo Alejandre is a 78 y.o. (1955) female with a history of HFpEF, most recent EF: 60% 8/9/23 Cardiologist Dr. Luma Rojas  Lab Results   Component Value Date    LVEF 60 08/29/2023       Patient was on bipap, awake, laying in bed during the consultation and is agreeable to heart failure education. She was engaged and asked appropriate questions throughout the education session. Barriers identified during consult contributing to HF Hospitalization:  [] Limited medication adherence   [] Poor health literacy, education regarding HF medications provided   [] Pill box provided to patient  [] Difficulty affording medications  [] Prescription assistance information given     [] Not weighing themselves daily  [x] Weight log provided for easy monitoring  [] Scale provided     [] Not following low sodium diet  [] Food insecurity   [x] 2 gram sodium diet education provided   [] Low sodium recipes provided  [] Sodium free seasoning provided   [] Low sodium meal delivery options given to patient  [] Dietician consulted     [] Lack of transportation to appointments     [] Depression, given chronic illness  [] Primary team notified     [] Goals of care need addressed  [] Palliative care consulted     [] CHF CHW consulted, to assist with         Chart Reviewed:  Diet: Diet NPO Exceptions are: Other (Specify);  Specify Other Exceptions: no exceptions   Daily Weights: Patient Vitals for the past 96 hrs (Last 3 readings):   Weight   08/30/23 2056 143 lb 12.8 oz (65.2 kg)   08/30/23 0549 154 lb 9.6 oz (70.1 kg)   08/28/23 0600 155 lb 3.3 oz (70.4 kg)     I/O:   Intake/Output Summary (Last 24 hours) at 8/31/2023 1716  Last data filed at 8/31/2023 1712  Gross per 24 hour   Intake --   Output 300 ml   Net -300 ml       [] Nursing staff/manager notified of inaccurate mendoza weights or I/O        Discharge Plan:  Above identified barriers reviewed and needs addressed    Patient/family educated on daily monitoring tools for CHF, made aware of signs and symptoms of worsening HF and to notify provider immediately of change in symptoms. Heart Failure Home Instructions placed in patient's discharge instructions    Per AHA guidelines patient to be closely monitored following discharge with 7 day follow up appointment    Scheduling with the CHF clinic Already follows, post hospital appointment made    Future Appointments   Date Time Provider [address]   9/15/2023  1:00 PM [facility] CHF ROOM 1 [facility] CHF [facility]   9/19/2023  1:45 PM Emy Botello MD [facility]   11/20/2023  2:20 PM Maira Gresham MD [address] [facility]           Patient Education:  Self Monitoring/management:  Reviewed the introduction to Heart Failure, the HF zones, signs and symptoms to report on day 1 of onset, medications, medication compliance, the importance of obtaining daily weights, following a low sodium diet, reading food labels for the sodium content, keeping physician appointments, and smoking cessation. Discussed writing / tracking daily weights on a calendar / log because a 5 pound gain in 1 week can sneak up if you are not tracking it. Advised patient they can reduce the risk for Heart Failure exacerbations by modifying / controlling the risk factors. Discussed self-managed care which includes the following: take medications as prescribed, report any intolerable side effects of medications to the medical provider (PCP or cardiologist), do not just stop taking the medication; follow a cardiac heart healthy / low sodium diet; weigh yourself daily, exercise regularly- per doctor recommendation and not to smoke or use an excess amount of alcohol. Discussed calling the cardiologist / doctor with a weight gain of 3 pounds in one day or a total of 5 pounds or more in one week.  Also, if you should have a significant weight loss of 3# or more in one day to call

## 2023-08-31 NOTE — CARE COORDINATION
Care Coordination  Sh was admitted with sob and has a pmh of sarcoidosis and pulmonary hypertension. She was originally vented and sedated but this am on a Airvo at 40 % fio2. She is also post cardiac arrest. She has an ngt with tube feedings. Spoke to Jessica at [employer] here in [facility] the precert was started and should here something today. If peer to peer needed Jessica may ask Dr Julio C Barnes to do it.  Will follow

## 2023-08-31 NOTE — PLAN OF CARE
Patient's chart updated to reflect:      . - HF care plan, HF education points and HF discharge instructions.  -Orders: daily weights, I/O.  -PCP and cardiology follow up appointments to be scheduled within 7 days of hospital discharge.       Marychuy Sheridan RN   Heart Failure Navigator

## 2023-08-31 NOTE — PROGRESS NOTES
Date: 8/31/2023    Time: 2:25 AM    Patient Placed On BIPAP/CPAP/ Non-Invasive Ventilation? Patient continues on bipap    If no must comment. Facial area red/color change? No           If YES are Blister/Lesion present? No   If yes must notify nursing staff  BIPAP/CPAP skin barrier?   Yes    Skin barrier type:mepilexlite       Comments:        Michael Pan RCP

## 2023-08-31 NOTE — CARE COORDINATION
Care Coordination:  Phone call from Jessica at [facility]. Peer to Peer appeal completed by Dr. Mahesh Bello and denied. Per reviewer, they request results of an MBS and would prefer patient undergo PEG prior to transfer if necessary. They also request that  neurosurgery  clarify plan for thoracic fractures acute vs subacute and clear patient for participation in therapy. If further information/documentation in this regard obtained they will attempt to appeal.  ILEANA/CM . Perfect Serve message to attending. Re above.  At 410 pm

## 2023-08-31 NOTE — PROGRESS NOTES
[facility] Inpatient   Resident Progress Note    S:  Hospital day: 12    Brief Synopsis: Mahogany Rowe is a 78 y.o. female with a past medical history of pulmonary sarcoidosis, atrial fibrillation on Eliquis, pulmonary hypertension, diabetes insipidus, hypothyroidism, hypertension, chronic kidney disease stage IIIa and combined systolic/diastolic heart failure who presents to the emergency department for shortness of breath. Patient was recently discharged from [facility] (08/18/23) due to acute hypoxic respiratory failure. Patient has had numerous hospital admissions for the same complaint. Hypotensive, tachycardic and hypoxic at presentation. She did decompensate and ended up in PEA. 2 rounds of CPR were performed and ROSC achieved. Patient admitted to the intensive care unit for acute hypoxic respiratory failure requiring intubation and vasopressors. Overnight/Interim  Patient moved from  to the floors  Patient is still on  BiPaP. She is unable to tolerate being off the mask. She feels pain but was given lidocaine patch to help with the pain. Patient was alert and pleasant ,able to follow simple commands.       Cont meds:    IV infusion builder      sodium chloride      sodium chloride Stopped (08/29/23 1449)    dextrose       Scheduled meds:    aspirin        furosemide  40 mg IntraVENous Once    apixaban  5 mg Oral BID    ipratropium 0.5 mg-albuterol 2.5 mg  1 Dose Inhalation Q4H WA RT    metoprolol  2.5 mg IntraVENous Q6H    lidocaine  1 patch TransDERmal Daily    [Held by provider] metoprolol tartrate  25 mg Oral BID    [START ON 9/3/2023] predniSONE  5 mg Oral Daily    levETIRAcetam  500 mg IntraVENous Q12H    potassium & sodium phosphates  1 packet Per NG tube q8h    [Held by provider] levETIRAcetam  500 mg Oral BID    lidocaine PF  5 mL IntraDERmal Once    sodium chloride flush  5-40 mL IntraVENous 2 times per day    heparin flush  3 mL IntraVENous 2 times per day degenerative changes of the spine. 7. There is no pulmonary embolus. CT ABDOMEN PELVIS W IV CONTRAST Additional Contrast? None   Final Result   1. Increased bibasilar areas of consolidation and or atelectasis. 2. The cecum is markedly distended and stool-filled raising the possibility   of constipation. As this area appears isolated, a developing cecal volvulus   could give a similar appearance. Short-term follow-up is recommended after   medical therapy. 3. Multilevel degenerative disc disease   4. Compression fractures involving L5 and T12, relatively stable. 5. High position of the nasogastric tube and this may be advanced into the   body 5-6 cm.   6. Prominent umbilical hernia         XR ABDOMEN FOR NG/OG/NE TUBE PLACEMENT   Final Result   1. The tip of the NG tube appears to be 6 cm inferior to the gastroesophageal   junction. The side hole is seen at the level of the gastroesophageal   junction. The NG tube should be advanced by an additional 5-10 cm if   possible. XR CHEST PORTABLE   Final Result   1. Increased interstitial prominence within the lungs concerning for   developing pulmonary edema or fluid overload   2. Increased bibasilar linear opacities suggest worsening atelectasis. 3. The position and alignment of the tubes and catheters appear within the   normal range         XR CHEST PORTABLE   Final Result   Cardiomediastinal silhouette appears enlarged. Prominent hilar/perihilar structures probably reflecting prominent pulmonary   vessels however cannot entirely exclude nodule/mass. Would advise a   follow-up PA chest radiograph. Mild bibasilar atelectasis or infiltrate. No gross pneumothorax. Deformity of the right humeral head.          XR CHEST PORTABLE    (Results Pending)   XR CHEST PORTABLE    (Results Pending)       A/P:  Principal Problem:    Cardiac arrest [facility])  Active Problems:    Altered mental status    Pulmonary hypertension    Pneumonia due to infectious organism    Moderate protein-calorie malnutrition ([address])    Shortness of breath    Palliative care encounter    Palliative care by specialist  Resolved Problems:    * No resolved hospital problems. *    Altered Mental Status - Resolved       Acute hypoxic respiratory failure  Status post cardiac arrest  Left lower lobe pneumonia  Severe pulmonary hypertension  Pulmonary sarcoidosis  History of moderate COPD, stage II by gold classification on 5 to 6 L at home  ProBNP 12,793 from 86,600. Chest x-ray 08/30/23 shows mild pulmonary venous hypertension and bilateral interstitial prominence. Improved from 08/29/23. Repeat CXR. Sputum culture - insignificant findings. ID on board. Completed Zerbaxa   Continue solumedrol 40 mg IV every 8 hours  Continue Toprol XL and hydralazine. Revaluate goals of care discussion with palliative and . Continue home medication Prednisone for Sarcoid      Chronic kidney disease  Hyponatremia and hypochloremia  History of diabetes insipidus  Lasix x1 dose today 08/31/23  Nephro on board  - D5W & 0.225%NS with 20 mEq Kcl @ 60 cc/hr    Umbilical Hernia  Continue polyethylene glycol and senna  CT abdomen/pelvis was unremarkable for any mechanical obstruction 08/21/23  Gen surg was consulted for umbilical hernia with suspecting cecal volvulus - Plan to monitor bowel movements with no acute surgical intervention at this time. Chronic microcytic anemia  History of iron deficiency  Leukopenia  Transfused 2 units RBC ON 8/23. monitor H/H and transfused if hemoglobin < 7  Consider restarting ferrous sulfate     Paroxysmal atrial fibrillation  Continue amiodarone 200 mg daily, Toprol XL, Eliquis         PT/OT evaluation: pending  DVT/GI prophylaxis: Eliquis/protonix  Diet: N.p.o., pending dietary consult  Disposition: Continue current management. Recommend facility vs hospice due to multiple hospital admissions.       Electronically signed by Jac Barrera MD on

## 2023-08-31 NOTE — PROGRESS NOTES
Physician Progress Note      Christopher Gallego  CSN #:                  [id number]  :                       1955  ADMIT DATE:       2023 2:21 AM  DISCH DATE:  RESPONDING  PROVIDER #:        Nathaniel Lu          QUERY TEXT:    Patient admitted with Sepsis. Patient noted not to be responsive to voice   after cardiac arrest.  Please document in progress notes and discharge summary   if you are treating and/or evaluating any of the following: The medical record reflects the following:  Risk Factors: Anoxic brain injury  Clinical Indicators: Pt had cardiac arrest after arrival to ED, Noted Coma   scale of 7 in ED, Exam shows pt is not responsive to voice or painful stimuli   has a weak gag reflex  Treatment: Continuous pt monitoring, intubation, serial neuro exams, serial   imaging, serial labs    Thank you,  Alesha HERRERA, RN, CRCR  Clinical Documentation Improvement  [email or web address]  Options provided:  -- Coma  -- Somnolence  -- Other - I will add my own diagnosis  -- Disagree - Not applicable / Not valid  -- Disagree - Clinically unable to determine / Unknown  -- Refer to Clinical Documentation Reviewer    PROVIDER RESPONSE TEXT:    Pt is in a coma.     Query created by: Page Canas on 2023 2:21 PM      Electronically signed by:  Nathaniel Lu 2023 7:05 AM

## 2023-08-31 NOTE — PLAN OF CARE
Problem: Chronic Conditions and Co-morbidities  Goal: Patient's chronic conditions and co-morbidity symptoms are monitored and maintained or improved  Outcome: Progressing     Problem: Discharge Planning  Goal: Discharge to home or other facility with appropriate resources  Outcome: Progressing     Problem: Safety - Adult  Goal: Free from fall injury  Outcome: Progressing  Flowsheets  Taken 8/31/2023 0800 by Luana Gomes RN  Free From Fall Injury: Instruct family/caregiver on patient safety  Taken 8/31/2023 0006 by Anthony Pearl RN  Free From Fall Injury: Instruct family/caregiver on patient safety     Problem: Pain  Goal: Verbalizes/displays adequate comfort level or baseline comfort level  Outcome: Progressing     Problem: Skin/Tissue Integrity  Goal: Absence of new skin breakdown  Description: 1. Monitor for areas of redness and/or skin breakdown  2. Assess vascular access sites hourly  3. Every 4-6 hours minimum:  Change oxygen saturation probe site  4. Every 4-6 hours:  If on nasal continuous positive airway pressure, respiratory therapy assess nares and determine need for appliance change or resting period. Outcome: Progressing     Problem: ABCDS Injury Assessment  Goal: Absence of physical injury  Outcome: Progressing  Flowsheets (Taken 8/31/2023 0800)  Absence of Physical Injury: Implement safety measures based on patient assessment     Problem: Neurosensory - Adult  Goal: Achieves stable or improved neurological status  Outcome: Progressing  Flowsheets (Taken 8/31/2023 0014 by Anthony Pearl RN)  Achieves stable or improved neurological status:   Assess for and report changes in neurological status   Monitor temperature, glucose, and sodium.  Initiate appropriate interventions as ordered  Goal: Absence of seizures  Outcome: Progressing  Goal: Remains free of injury related to seizures activity  Outcome: Progressing  Goal: Achieves maximal functionality and self care  Outcome: Progressing Problem: Respiratory - Adult  Goal: Achieves optimal ventilation and oxygenation  Outcome: Progressing     Problem: Cardiovascular - Adult  Goal: Maintains optimal cardiac output and hemodynamic stability  Outcome: Progressing  Flowsheets (Taken 8/31/2023 0014 by Jatinder Jensen RN)  Maintains optimal cardiac output and hemodynamic stability:   Monitor blood pressure and heart rate   Monitor urine output and notify Licensed Independent Practitioner for values outside of normal range  Goal: Absence of cardiac dysrhythmias or at baseline  Outcome: Progressing     Problem: Skin/Tissue Integrity - Adult  Goal: Skin integrity remains intact  Outcome: Progressing  Flowsheets  Taken 8/31/2023 0800 by Shawnee Vences RN  Skin Integrity Remains Intact: Monitor for areas of redness and/or skin breakdown  Taken 8/31/2023 0006 by Jatinder Jensen RN  Skin Integrity Remains Intact:   Monitor for areas of redness and/or skin breakdown   Assess vascular access sites hourly  Goal: Incisions, wounds, or drain sites healing without S/S of infection  Outcome: Progressing  Flowsheets (Taken 8/31/2023 0800)  Incisions, Wounds, or Drain Sites Healing Without Sign and Symptoms of Infection: ADMISSION and DAILY: Assess and document risk factors for pressure ulcer development  Goal: Oral mucous membranes remain intact  Outcome: Progressing  Flowsheets (Taken 8/31/2023 0800)  Oral Mucous Membranes Remain Intact: Assess oral mucosa and hygiene practices     Problem: Nutrition Deficit:  Goal: Optimize nutritional status  Outcome: Progressing

## 2023-08-31 NOTE — PROGRESS NOTES
Comprehensive Nutrition Assessment    Type and Reason for Visit:  Reassess    Nutrition Recommendations/Plan:   Pt. Currently NPO. Please consult RD as needed for EN recs. Malnutrition Assessment:  Malnutrition Status: Moderate malnutrition (08/22/23 1034)    Context:  Chronic Illness     Findings of the 6 clinical characteristics of malnutrition:  Energy Intake:  75% or less estimated energy requirements for 1 month or longer  Weight Loss:  Unable to assess (BAYRON  d/t Hx of CKD/CHF and possible fluid shifts + varied wt hx)     Body Fat Loss:  Mild body fat loss Orbital, Triceps   Muscle Mass Loss:  Mild muscle mass loss Temples (temporalis), Clavicles (pectoralis & deltoids), Scapula (trapezius)  Fluid Accumulation:  No significant fluid accumulation     Strength:  Not Performed    Nutrition Assessment:    Pt remains at risk d/t moderate malnutrition w/ ongoing NPO status/EN support. Noted pt. now extubated and placed on bipap. Admit w/ re-current respiratory distress now s/p cardiac arrest in ED (just d/c from hospital 8/18/)- suspected anoxic encephalopathy. Noted Septic shock 2/2 PNA. Noted IRMA & Shock Liver. PMHx COPD,Sarcoidosis/pulmonary fibrosis, CHF, previous SBO, colitis/ colectomy. Pt meets criteria for Moderate Malnutrition. Pt. currently NPO. Will provide updated TF recs as needed based on current medical status & monitor. Nutrition Related Findings:    Oriented x2, Bipap, MAP 146L, +I/O's 10.2L, trace/+1 edema, active BS, rounded/soft abd, large abd hernia, Wound Type: None       Current Nutrition Intake & Therapies:    Average Meal Intake: NPO  Average Supplements Intake: NPO  Diet NPO Exceptions are: Other (Specify);  Specify Other Exceptions: no exceptions    Anthropometric Measures:  Height: 5' 2\" (157.5 cm)  Ideal Body Weight (IBW): 110 lbs (50 kg)    Admission Body Weight: 170 lb (77.1 kg) (8/21 first measured, elevated will not use)  Current Body Weight: 154 lb 9.6 oz (70.1 kg) (8/30), 140.5 % IBW. Weight Source: Bed Scale  Current BMI (kg/m2): 28.3  Usual Body Weight:  (EMR measured wt ranges 141-166lb x 3 mon back. Fluctuations likely r/t CHF/ multiple hospital stays)     Weight Adjustment For: No Adjustment                 BMI Categories: Overweight (BMI 25.0-29. 9)    Estimated Daily Nutrient Needs:  Energy Requirements Based On: Formula  Weight Used for Energy Requirements: Current  Energy (kcal/day): 1400-1600kcal (MSJ x1.2-1. 3SF)  Weight Used for Protein Requirements: Ideal  Protein (g/day): 1.5-1.8; 75-90  Method Used for Fluid Requirements: Standard Renal  Fluid (ml/day): per nephrology/medical team    Nutrition Diagnosis:   Moderate malnutrition, In context of chronic illness related to catabolic illness (Hx of COPD; multiple comorbidities) as evidenced by Criteria as identified in malnutrition assessment, poor intake prior to admission    Nutrition Interventions:   Food and/or Nutrient Delivery: Continue NPO (Rec Peptide Based (Vital AF 1.2) @ 50 mll/hr. Will provide: 1200 ml tv, 1440 kcals, 90 gm pro, 973 ml free water)  Nutrition Education/Counseling: Education not appropriate  Coordination of Nutrition Care: Continue to monitor while inpatient       Goals:  Previous Goal Met: Goal(s) Achieved (currently NPO)  Goals: Initiate nutrition support, by next RD assessment       Nutrition Monitoring and Evaluation:   Behavioral-Environmental Outcomes: None Identified  Food/Nutrient Intake Outcomes: Enteral Nutrition Intake/Tolerance  Physical Signs/Symptoms Outcomes: Biochemical Data, Nutrition Focused Physical Findings, Skin, Weight, GI Status, Fluid Status or Edema, Hemodynamic Status    Discharge Planning:     Too soon to determine     Yusuf Beard RD  Contact: ext [phone]

## 2023-08-31 NOTE — PROGRESS NOTES
Occupational Therapy  Attempted OT eval - pt transferred to Intermediate Unit. Remains on Continuous BiPAP, FiO2 40. Unable to tolerate removal of mask for meds/meals, minimal hygiene w/o O2 sats dropping significantly. Not yet appropriate to participate in therapy assessment. Will attempt at a later time.   Thank you for this referral JAELYN Valdez, OTR/L  # [id number]

## 2023-09-01 ENCOUNTER — APPOINTMENT (OUTPATIENT)
Dept: GENERAL RADIOLOGY | Age: 68
DRG: 870 | End: 2023-09-01
Payer: MEDICARE

## 2023-09-01 LAB
ALBUMIN SERPL-MCNC: 3.5 G/DL (ref 3.5–5.2)
ALP SERPL-CCNC: 133 U/L (ref 35–104)
ALT SERPL-CCNC: 37 U/L (ref 0–32)
ANION GAP SERPL CALCULATED.3IONS-SCNC: 12 MMOL/L (ref 7–16)
ANION GAP SERPL CALCULATED.3IONS-SCNC: 16 MMOL/L (ref 7–16)
AST SERPL-CCNC: 19 U/L (ref 0–31)
BASOPHILS # BLD: 0.01 K/UL (ref 0–0.2)
BASOPHILS NFR BLD: 0 % (ref 0–2)
BILIRUB SERPL-MCNC: 0.4 MG/DL (ref 0–1.2)
BNP SERPL-MCNC: ABNORMAL PG/ML (ref 0–125)
BUN SERPL-MCNC: 21 MG/DL (ref 6–23)
BUN SERPL-MCNC: 23 MG/DL (ref 6–23)
CALCIUM SERPL-MCNC: 9 MG/DL (ref 8.6–10.2)
CALCIUM SERPL-MCNC: 9.2 MG/DL (ref 8.6–10.2)
CHLORIDE SERPL-SCNC: 106 MMOL/L (ref 98–107)
CHLORIDE SERPL-SCNC: 106 MMOL/L (ref 98–107)
CO2 SERPL-SCNC: 25 MMOL/L (ref 22–29)
CO2 SERPL-SCNC: 25 MMOL/L (ref 22–29)
CREAT SERPL-MCNC: 1.1 MG/DL (ref 0.5–1)
CREAT SERPL-MCNC: 1.3 MG/DL (ref 0.5–1)
EOSINOPHIL # BLD: 0 K/UL (ref 0.05–0.5)
EOSINOPHILS RELATIVE PERCENT: 0 % (ref 0–6)
ERYTHROCYTE [DISTWIDTH] IN BLOOD BY AUTOMATED COUNT: 18.3 % (ref 11.5–15)
GFR SERPL CREATININE-BSD FRML MDRD: 46 ML/MIN/1.73M2
GFR SERPL CREATININE-BSD FRML MDRD: 54 ML/MIN/1.73M2
GLUCOSE BLD-MCNC: 105 MG/DL (ref 74–99)
GLUCOSE SERPL-MCNC: 89 MG/DL (ref 74–99)
GLUCOSE SERPL-MCNC: 97 MG/DL (ref 74–99)
HCT VFR BLD AUTO: 27.4 % (ref 34–48)
HGB BLD-MCNC: 8.5 G/DL (ref 11.5–15.5)
LYMPHOCYTES NFR BLD: 0.22 K/UL (ref 1.5–4)
LYMPHOCYTES RELATIVE PERCENT: 4 % (ref 20–42)
MAGNESIUM SERPL-MCNC: 2 MG/DL (ref 1.6–2.6)
MAGNESIUM SERPL-MCNC: 2 MG/DL (ref 1.6–2.6)
MCH RBC QN AUTO: 26.4 PG (ref 26–35)
MCHC RBC AUTO-ENTMCNC: 31 G/DL (ref 32–34.5)
MCV RBC AUTO: 85.1 FL (ref 80–99.9)
MONOCYTES NFR BLD: 0.46 K/UL (ref 0.1–0.95)
MONOCYTES NFR BLD: 9 % (ref 2–12)
NEUTROPHILS NFR BLD: 87 % (ref 43–80)
NEUTS SEG NFR BLD: 4.67 K/UL (ref 1.8–7.3)
PHOSPHATE SERPL-MCNC: 3 MG/DL (ref 2.5–4.5)
PHOSPHATE SERPL-MCNC: 3.7 MG/DL (ref 2.5–4.5)
PLATELET # BLD AUTO: 185 K/UL (ref 130–450)
PMV BLD AUTO: 10.8 FL (ref 7–12)
POTASSIUM SERPL-SCNC: 3.7 MMOL/L (ref 3.5–5)
POTASSIUM SERPL-SCNC: 3.7 MMOL/L (ref 3.5–5)
PROT SERPL-MCNC: 6.3 G/DL (ref 6.4–8.3)
RBC # BLD AUTO: 3.22 M/UL (ref 3.5–5.5)
RBC # BLD: ABNORMAL 10*6/UL
SODIUM SERPL-SCNC: 143 MMOL/L (ref 132–146)
SODIUM SERPL-SCNC: 147 MMOL/L (ref 132–146)
WBC OTHER # BLD: 5.4 K/UL (ref 4.5–11.5)

## 2023-09-01 PROCEDURE — 83735 ASSAY OF MAGNESIUM: CPT

## 2023-09-01 PROCEDURE — 80053 COMPREHEN METABOLIC PANEL: CPT

## 2023-09-01 PROCEDURE — A4216 STERILE WATER/SALINE, 10 ML: HCPCS

## 2023-09-01 PROCEDURE — 2700000000 HC OXYGEN THERAPY PER DAY

## 2023-09-01 PROCEDURE — 6370000000 HC RX 637 (ALT 250 FOR IP)

## 2023-09-01 PROCEDURE — 6370000000 HC RX 637 (ALT 250 FOR IP): Performed by: INTERNAL MEDICINE

## 2023-09-01 PROCEDURE — 80048 BASIC METABOLIC PNL TOTAL CA: CPT

## 2023-09-01 PROCEDURE — 6360000002 HC RX W HCPCS

## 2023-09-01 PROCEDURE — 36415 COLL VENOUS BLD VENIPUNCTURE: CPT

## 2023-09-01 PROCEDURE — 2580000003 HC RX 258

## 2023-09-01 PROCEDURE — 92526 ORAL FUNCTION THERAPY: CPT

## 2023-09-01 PROCEDURE — 94640 AIRWAY INHALATION TREATMENT: CPT

## 2023-09-01 PROCEDURE — 84100 ASSAY OF PHOSPHORUS: CPT

## 2023-09-01 PROCEDURE — 85025 COMPLETE CBC W/AUTO DIFF WBC: CPT

## 2023-09-01 PROCEDURE — 99232 SBSQ HOSP IP/OBS MODERATE 35: CPT | Performed by: FAMILY MEDICINE

## 2023-09-01 PROCEDURE — 2580000003 HC RX 258: Performed by: INTERNAL MEDICINE

## 2023-09-01 PROCEDURE — 83880 ASSAY OF NATRIURETIC PEPTIDE: CPT

## 2023-09-01 PROCEDURE — 82962 GLUCOSE BLOOD TEST: CPT

## 2023-09-01 PROCEDURE — 99232 SBSQ HOSP IP/OBS MODERATE 35: CPT

## 2023-09-01 PROCEDURE — 6360000002 HC RX W HCPCS: Performed by: INTERNAL MEDICINE

## 2023-09-01 PROCEDURE — 94660 CPAP INITIATION&MGMT: CPT

## 2023-09-01 PROCEDURE — 2500000003 HC RX 250 WO HCPCS: Performed by: INTERNAL MEDICINE

## 2023-09-01 PROCEDURE — 2060000000 HC ICU INTERMEDIATE R&B

## 2023-09-01 PROCEDURE — C9113 INJ PANTOPRAZOLE SODIUM, VIA: HCPCS

## 2023-09-01 PROCEDURE — 92611 MOTION FLUOROSCOPY/SWALLOW: CPT

## 2023-09-01 PROCEDURE — 2500000003 HC RX 250 WO HCPCS: Performed by: PHYSICIAN ASSISTANT

## 2023-09-01 PROCEDURE — 74230 X-RAY XM SWLNG FUNCJ C+: CPT

## 2023-09-01 RX ORDER — FENTANYL CITRATE 50 UG/ML
25 INJECTION, SOLUTION INTRAMUSCULAR; INTRAVENOUS ONCE
Status: COMPLETED | OUTPATIENT
Start: 2023-09-01 | End: 2023-09-01

## 2023-09-01 RX ADMIN — ARFORMOTEROL TARTRATE 15 MCG: 15 SOLUTION RESPIRATORY (INHALATION) at 20:10

## 2023-09-01 RX ADMIN — IPRATROPIUM BROMIDE AND ALBUTEROL SULFATE 1 DOSE: .5; 2.5 SOLUTION RESPIRATORY (INHALATION) at 20:10

## 2023-09-01 RX ADMIN — POTASSIUM CHLORIDE: 2 INJECTION, SOLUTION, CONCENTRATE INTRAVENOUS at 06:01

## 2023-09-01 RX ADMIN — Medication 10 ML: at 22:32

## 2023-09-01 RX ADMIN — POTASSIUM CHLORIDE: 2 INJECTION, SOLUTION, CONCENTRATE INTRAVENOUS at 12:52

## 2023-09-01 RX ADMIN — BUDESONIDE INHALATION 500 MCG: 0.5 SUSPENSION RESPIRATORY (INHALATION) at 20:10

## 2023-09-01 RX ADMIN — IPRATROPIUM BROMIDE AND ALBUTEROL SULFATE 1 DOSE: .5; 2.5 SOLUTION RESPIRATORY (INHALATION) at 14:58

## 2023-09-01 RX ADMIN — ARFORMOTEROL TARTRATE 15 MCG: 15 SOLUTION RESPIRATORY (INHALATION) at 07:39

## 2023-09-01 RX ADMIN — METOPROLOL TARTRATE 2.5 MG: 5 INJECTION, SOLUTION INTRAVENOUS at 17:13

## 2023-09-01 RX ADMIN — BARIUM SULFATE 15 ML: 400 SUSPENSION ORAL at 16:33

## 2023-09-01 RX ADMIN — FENTANYL CITRATE 25 MCG: 50 INJECTION INTRAMUSCULAR; INTRAVENOUS at 14:16

## 2023-09-01 RX ADMIN — STANDARDIZED SENNA CONCENTRATE 8.6 MG: 8.6 TABLET ORAL at 22:31

## 2023-09-01 RX ADMIN — SODIUM CHLORIDE, PRESERVATIVE FREE 10 ML: 5 INJECTION INTRAVENOUS at 17:13

## 2023-09-01 RX ADMIN — METOPROLOL TARTRATE 2.5 MG: 5 INJECTION, SOLUTION INTRAVENOUS at 12:37

## 2023-09-01 RX ADMIN — Medication 250 MG: at 17:13

## 2023-09-01 RX ADMIN — METOPROLOL TARTRATE 2.5 MG: 5 INJECTION, SOLUTION INTRAVENOUS at 06:25

## 2023-09-01 RX ADMIN — LEVETIRACETAM 500 MG: 100 INJECTION INTRAVENOUS at 08:18

## 2023-09-01 RX ADMIN — Medication 10 ML: at 08:19

## 2023-09-01 RX ADMIN — BARIUM SULFATE 15 ML: 0.81 POWDER, FOR SUSPENSION ORAL at 16:34

## 2023-09-01 RX ADMIN — BUDESONIDE INHALATION 500 MCG: 0.5 SUSPENSION RESPIRATORY (INHALATION) at 07:39

## 2023-09-01 RX ADMIN — HYDRALAZINE HYDROCHLORIDE 25 MG: 25 TABLET, FILM COATED ORAL at 21:34

## 2023-09-01 RX ADMIN — LEVETIRACETAM 500 MG: 100 INJECTION INTRAVENOUS at 21:37

## 2023-09-01 RX ADMIN — SODIUM CHLORIDE, PRESERVATIVE FREE 40 MG: 5 INJECTION INTRAVENOUS at 08:18

## 2023-09-01 RX ADMIN — HEPARIN 300 UNITS: 100 SYRINGE at 08:18

## 2023-09-01 RX ADMIN — BARIUM SULFATE 15 ML: 400 PASTE ORAL at 16:33

## 2023-09-01 RX ADMIN — IPRATROPIUM BROMIDE AND ALBUTEROL SULFATE 1 DOSE: .5; 2.5 SOLUTION RESPIRATORY (INHALATION) at 10:59

## 2023-09-01 RX ADMIN — IPRATROPIUM BROMIDE AND ALBUTEROL SULFATE 1 DOSE: .5; 2.5 SOLUTION RESPIRATORY (INHALATION) at 07:39

## 2023-09-01 RX ADMIN — HYDRALAZINE HYDROCHLORIDE 10 MG: 20 INJECTION, SOLUTION INTRAMUSCULAR; INTRAVENOUS at 08:18

## 2023-09-01 RX ADMIN — HEPARIN 300 UNITS: 100 SYRINGE at 21:35

## 2023-09-01 RX ADMIN — Medication 10 ML: at 08:24

## 2023-09-01 RX ADMIN — SODIUM CHLORIDE, PRESERVATIVE FREE 10 ML: 5 INJECTION INTRAVENOUS at 14:16

## 2023-09-01 RX ADMIN — Medication 10 ML: at 21:34

## 2023-09-01 RX ADMIN — METOPROLOL TARTRATE 2.5 MG: 5 INJECTION, SOLUTION INTRAVENOUS at 01:23

## 2023-09-01 RX ADMIN — APIXABAN 5 MG: 5 TABLET, FILM COATED ORAL at 21:34

## 2023-09-01 ASSESSMENT — PAIN DESCRIPTION - DESCRIPTORS: DESCRIPTORS: ACHING

## 2023-09-01 ASSESSMENT — PAIN SCALES - GENERAL
PAINLEVEL_OUTOF10: 8
PAINLEVEL_OUTOF10: 0

## 2023-09-01 ASSESSMENT — PAIN DESCRIPTION - LOCATION: LOCATION: CHEST

## 2023-09-01 ASSESSMENT — PAIN DESCRIPTION - ORIENTATION: ORIENTATION: MID

## 2023-09-01 NOTE — PROGRESS NOTES
SPEECH/LANGUAGE PATHOLOGY  VIDEOFLUOROSCOPIC STUDY OF SWALLOWING (MBS)   and PLAN OF CARE    PATIENT NAME:  Fatuma Gutierrez  (female)     MRN:  [id number]    :  1955  (78 y.o.)  STATUS:  Inpatient: Room [facility]5/4515-A    TODAY'S DATE:  2023  REFERRING PROVIDER:     SPECIFIC PROVIDER ORDER: FL modified barium swallow with video  Date of order:  23   REASON FOR REFERRAL: dysphagia   EVALUATING THERAPIST: Fracisco Goddard SLP      RESULTS:      DYSPHAGIA DIAGNOSIS:  moderate oropharyngeal phase dysphagia     DIET RECOMMENDATIONS:  Pureed consistency solids (IDDSI level 4) with  honey consistency (moderately thick - IDDSI level 3)  liquids    FEEDING RECOMMENDATIONS:    Assistance level:  Full assistance is needed during all oral intake     Compensatory strategies recommended: No strategies are recommended at this time     Discussed recommendations with nursing and/or faxed report to referring provider: Yes    SPEECH THERAPY  PLAN OF CARE   The dysphagia POC is established based on physician order and dysphagia diagnosis    Skilled SLP intervention for dysphagia management up to 6 x per week until goals met, pt plateaus in function and/or discharged from hospital      Conditions Requiring Skilled Therapeutic Intervention for dysphagia:    Patient is performing below functional baseline d/t  current acute condition, Multiple diagnoses, multiple medications, and increased dependency upon caregivers.   Reduced laryngeal closure resulting in penetration    SPECIFIC DYSPHAGIA INTERVENTIONS TO INCLUDE:     Therapeutic exercises  Trials of upgraded diet/liquid     Specific instructions for next treatment:  ongoing PO analysis to upgrade diet and evaluate tolerance of current PO recommendation and initiate instruction of therapeutic exercises   Treatment Goals:    Short Term Goals:  Pt will complete laryngeal strength/ ROM therapeutic exercises to improve airway protection for the least restrictive PO diet moderate

## 2023-09-01 NOTE — PROGRESS NOTES
Occupational Therapy  Attempted OT eval this AM, however, noted pt's O2 requirements increased overnight. Now FiO2 65 on continuous BiPAP. Spoke with RN - therapist and RN in agreement to hold therapy for pt's safety this date. Will attempt Assessment at a later time. Thank you for this referral. JAELYN Dunbar, OTR/L  # [id number]    Addendum:  NS consulted for possible Compression fractures involving L5 and T12 found on CT 8-19-23. Will additionally await NS recommendations.   JAELYN Dunbar, OTR/L  # [id number]'

## 2023-09-01 NOTE — PROGRESS NOTES
[facility] Inpatient   Resident Progress Note    S:  Hospital day: 13    Brief Synopsis: Cathyann Lanes is a 78 y.o. female with a past medical history of pulmonary sarcoidosis, atrial fibrillation on Eliquis, pulmonary hypertension, diabetes insipidus, hypothyroidism, hypertension, chronic kidney disease stage IIIa and combined systolic/diastolic heart failure who presents to the emergency department for shortness of breath. Patient was recently discharged from [facility] (08/18/23) due to acute hypoxic respiratory failure. Patient has had numerous hospital admissions for the same complaint. Hypotensive, tachycardic and hypoxic at presentation. She did decompensate and ended up in PEA. 2 rounds of CPR were performed and ROSC achieved. Patient admitted to the intensive care unit for acute hypoxic respiratory failure requiring intubation and vasopressors. Overnight/Interim  Patient was on AirVo. She was able to tolerate it at 79 but not at 60. Patient was alert and able to follow command  She states that she wants to go home and gets teary  Unable to contact  to discuss plan for discharge. MBSS and PT/Ot postponed due to respiratory status.       Cont meds:    IV infusion builder 60 mL/hr at 09/01/23 0601    sodium chloride      sodium chloride Stopped (08/29/23 1449)    dextrose       Scheduled meds:    apixaban  5 mg Oral BID    ipratropium 0.5 mg-albuterol 2.5 mg  1 Dose Inhalation Q4H WA RT    metoprolol  2.5 mg IntraVENous Q6H    lidocaine  1 patch TransDERmal Daily    [Held by provider] metoprolol tartrate  25 mg Oral BID    [START ON 9/3/2023] predniSONE  5 mg Oral Daily    levETIRAcetam  500 mg IntraVENous Q12H    potassium & sodium phosphates  1 packet Per NG tube q8h    [Held by provider] levETIRAcetam  500 mg Oral BID    lidocaine PF  5 mL IntraDERmal Once    sodium chloride flush  5-40 mL IntraVENous 2 times per day    heparin flush  3 mL IntraVENous 2 times per day the endotracheal tube   6. Advanced degenerative changes of the spine. 7. There is no pulmonary embolus. CT ABDOMEN PELVIS W IV CONTRAST Additional Contrast? None   Final Result   1. Increased bibasilar areas of consolidation and or atelectasis. 2. The cecum is markedly distended and stool-filled raising the possibility   of constipation. As this area appears isolated, a developing cecal volvulus   could give a similar appearance. Short-term follow-up is recommended after   medical therapy. 3. Multilevel degenerative disc disease   4. Compression fractures involving L5 and T12, relatively stable. 5. High position of the nasogastric tube and this may be advanced into the   body 5-6 cm.   6. Prominent umbilical hernia         XR ABDOMEN FOR NG/OG/NE TUBE PLACEMENT   Final Result   1. The tip of the NG tube appears to be 6 cm inferior to the gastroesophageal   junction. The side hole is seen at the level of the gastroesophageal   junction. The NG tube should be advanced by an additional 5-10 cm if   possible. XR CHEST PORTABLE   Final Result   1. Increased interstitial prominence within the lungs concerning for   developing pulmonary edema or fluid overload   2. Increased bibasilar linear opacities suggest worsening atelectasis. 3. The position and alignment of the tubes and catheters appear within the   normal range         XR CHEST PORTABLE   Final Result   Cardiomediastinal silhouette appears enlarged. Prominent hilar/perihilar structures probably reflecting prominent pulmonary   vessels however cannot entirely exclude nodule/mass. Would advise a   follow-up PA chest radiograph. Mild bibasilar atelectasis or infiltrate. No gross pneumothorax. Deformity of the right humeral head.          XR CHEST PORTABLE    (Results Pending)   FL MODIFIED BARIUM SWALLOW W VIDEO    (Results Pending)       A/P:  Principal Problem:    Cardiac arrest [facility])  Active Problems:    Altered mental status    Pulmonary hypertension    Pneumonia due to infectious organism    Moderate protein-calorie malnutrition ([address])    Shortness of breath    Palliative care encounter    Palliative care by specialist  Resolved Problems:    * No resolved hospital problems. *    Altered Mental Status - Resolved       Acute hypoxic respiratory failure  Status post cardiac arrest  Left lower lobe pneumonia  Severe pulmonary hypertension  Pulmonary sarcoidosis  History of moderate COPD, stage II by gold classification on 5 to 6 L at home  ProBNP 12,793 from 62,256. Chest x-ray 08/30/23 shows mild pulmonary venous hypertension and bilateral interstitial prominence. Improved from 08/29/23. Repeat CXR. Sputum culture - insignificant findings. ID on board. Completed Zerbaxa   Continue solumedrol 40 mg IV every 8 hours  Continue Toprol XL and hydralazine. Revaluate goals of care discussion with palliative and . Continue home medication Prednisone for Sarcoid      Chronic kidney disease  Hyponatremia and hypochloremia  History of diabetes insipidus  Lasix x1 dose today 08/31/23  Nephro on board  - D5W & 0.225%NS with 20 mEq Kcl @ 60 cc/hr    Umbilical Hernia - stable  Continue polyethylene glycol and senna  CT abdomen/pelvis was unremarkable for any mechanical obstruction 08/21/23  Gen surg was consulted for umbilical hernia with suspecting cecal volvulus - Plan to monitor bowel movements with no acute surgical intervention at this time. Chronic microcytic anemia  History of iron deficiency  Leukopenia  Transfused 2 units RBC ON 8/23.  monitor H/H and transfused if hemoglobin < 7  Consider restarting ferrous sulfate     Paroxysmal atrial fibrillation  Continue amiodarone 200 mg daily, Toprol XL, Eliquis     Compression fractures   CF L5 and T12 indicated in C  PT/OT waiting for improvement in respiratory status  Neurosurgery consulted     09/01   - Awaiting placement  - Since patient is unable to ventilate without

## 2023-09-01 NOTE — PROGRESS NOTES
Date: 9/1/2023    Time: 3:39 AM    Patient Placed On BIPAP/CPAP/ Non-Invasive Ventilation? Patient continues on bipap    If no must comment. Facial area red/color change? No           If YES are Blister/Lesion present?no  If yes must notify nursing staff  BIPAP/CPAP skin barrier?   Yes    Skin barrier type:mepilexlite       Comments:        Frandy Barrera RCP

## 2023-09-01 NOTE — PROGRESS NOTES
Neurosurgery consult sent to Dr. Tracy Carroll via [facility]    Electronically signed by Amy Mercado RN on 9/1/2023 at 9:58 AM

## 2023-09-01 NOTE — PROGRESS NOTES
Nutrition Note    Consult received for poor appetite/intake. Chart reviewed. Noted pt. Currently NPO; Order for MBSS was placed however per SLP note: pt on 60 liters HHFNC and SLP Recommend NPO until respiratory status improves then re-order MBSS. There is possible indication for PEG placement for long term nutrition support. Will provide EN recs if needed. Regimen at goal meets 100% of est energy/protein needs. *Pt most recently assessed by RD on 8/31 (see RD note for full assessment). Will follow-up as planned. Please reconsult as RD needed for nutrition recs and if needed prior to follow-up. EN recs:  Renal Formula (Nepro 1.8) @35ml/hr x 23hr/d (hold 30 mins before and after synthroid) +1 protein mod once daily. Will provide: 805ml TV, 1449kcal, 65g pro (1553kcal, 91g pro total w/ pro mod x1), 584ml free water. Flush per nephrology. *Note: Pt. Is at risk for refeeding syndrome. Recommend to start at half/trickle w/ slow advance to gaol and Monitor electrolytes/replace PRN. Estimated Daily Nutrient Needs:  Energy Requirements Based On: Formula  Weight Used for Energy Requirements: Current  Energy (kcal/day): 1400-1600kcal (MSJ x1.2-1. 3SF)  Weight Used for Protein Requirements: Ideal  Protein (g/day): 75-90g (1.5-1.8g/kgIBW) (consideration for mod malnutrition; as tolerated w/ CKD; (equates to 1.2- 1.4 g/kg CBW))  Method Used for Fluid Requirements: Standard Renal  Fluid (ml/day): per nephrology/medical team    Electronically signed by Estelle Bailey RD on 9/1/23 at 10:56 AM EDT    Contact: ext [phone]

## 2023-09-01 NOTE — PROGRESS NOTES
Order for MBSS received however pt on 60 liters HHFNC .    Recommend NPO until respiratory status improves then please re-order MBSS

## 2023-09-01 NOTE — PROGRESS NOTES
Ritchie Hollingsworth M.D.,[facility]  Verner Carina, D.O., F.A.C.O.I., Robbie Ellis M.D. Veronica Mcdonald M.D. SARIKA Long.O. Daily Pulmonary Progress Note    Patient:  Indra Alanis 78 y.o. female MRN: [id number]     Date of Service: 9/1/2023      Synopsis     We are following patient for respiratory failure    \"CC\" cardiac arrest    Code status: Full code      Subjective      Patient was seen and examined lying in bed on AirVo 60 liters, 65%. She is scheduled for MBSS however it cannot be done until she is weaned off of AirVo. [facility] to peer for [facility] was denied stating they want a MBSS or PEG prior to her transferring there. The problem is she needs to go to [facility] because of the high oxygen need and her need to wean but she cannot go for the video swallow due to the high oxygen needs and surgery has evaluated her in the past and cannot do a PEG due to her hernia. Sarkis Valderrama has a long-standing history with our Pulmonary group and as long as we have taken care of her, her swallowing has never been an issue. Review of Systems:  Constitutional: Denies fever, weight loss, night sweats, and fatigue  Skin: Denies pigmentation, dark lesions, and rashes   HEENT: Denies hearing loss, tinnitus, ear drainage, epistaxis, sore throat, and hoarseness. Cardiovascular: Denies palpitations, chest pain, and chest pressure. Respiratory: Denies cough, dyspnea at rest, hemoptysis, apnea, and choking.   Gastrointestinal: Denies nausea, vomiting, poor appetite, diarrhea, heartburn or reflux  Genitourinary: Denies dysuria, frequency, urgency or hematuria  Musculoskeletal: Denies myalgias, muscle weakness, and bone pain  Neurological: Denies dizziness, vertigo, headache, and focal weakness  Psychological: Denies anxiety and depression  Endocrine: Denies heat intolerance and cold intolerance  Hematopoietic/Lymphatic: Denies bleeding problems and blood transfusions    24-hour events:  No new mucosa, and tongue normal.  Supple, symmetrical, trachea midline, no adenopathy;thyroid:  no enlargement/tenderness/nodules or JVD. Lung: Breath sounds CTA. Respirations   unlabored. Symmetrical expansion. Heart: RRR, normal S1, S2. No MRG  Abdomen: Soft, NT, ND. BS present x 4 quadrants. No bruit or organomegaly. Extremities: Pedal pulses 2+ symmetric b/l. Extremities normal, no cyanosis, clubbing, or edema. Musculokeletal: No joint swelling, no muscle tenderness. ROM normal in all joints of extremities. Neurologic: Mental status: Alert and Oriented X3 . Pertinent/ New Labs and Imaging Studies     Imaging personally reviewed:  CXR 8/31  No significant change in the appearance of chest.    Echo:  8/29  Left ventricular internal dimensions were normal in diastole and systole. Moderate left ventricular concentric hypertrophy noted. No regional wall motion abnormalities seen. Normal left ventricular ejection fraction. Moderately dilated right ventricle. Right ventricle global systolic function is reduced. Moderately enlarged right atrium size. Mild tricuspid regurgitation. Pulmonary hypertension is moderate to severe . There is a small circumferential pericardial effusion noted. Moderate left pleural effusion.     Labs:  Lab Results   Component Value Date/Time    WBC 5.4 09/01/2023 04:50 AM    HGB 8.5 09/01/2023 04:50 AM    HCT 27.4 09/01/2023 04:50 AM    HCT 41.0 04/28/2023 02:54 PM    MCV 85.1 09/01/2023 04:50 AM    MCH 26.4 09/01/2023 04:50 AM    MCHC 31.0 09/01/2023 04:50 AM    RDW 18.3 09/01/2023 04:50 AM     09/01/2023 04:50 AM    MPV 10.8 09/01/2023 04:50 AM     Lab Results   Component Value Date/Time     09/01/2023 04:50 AM    K 3.7 09/01/2023 04:50 AM    K 3.8 07/14/2023 04:12 AM     09/01/2023 04:50 AM    CO2 25 09/01/2023 04:50 AM    BUN 23 09/01/2023 04:50 AM    CREATININE 1.3 09/01/2023 04:50 AM    LABALBU 3.5 09/01/2023 04:50 AM    LABALBU 3.6 05/02/2012

## 2023-09-01 NOTE — CARE COORDINATION
Care Coordination  The patient was admitted with sob and has a pmh of sarcoidosis and pulmonary hypertension. She was originally vented and sedated but this am she is on 60 liters Airvo then weaned down to 15 liters high flow. Palliative care was consulted to see the patient for goals of care. They saw the patient today  and the plan is per her  to remain and full code and to continue her current treatment. Plan is to select [facility] however it was denied and peer to peer was done also and denied. Plan to send a written appeal on Tuesday to see if the denial can be overturned.  notified of this. She is also post cardiac arrest. She has an ngt with tube feedings and dietary consulted.  Notified the  of her denial to [facility] and plan to do a written appeal.

## 2023-09-02 ENCOUNTER — APPOINTMENT (OUTPATIENT)
Dept: CT IMAGING | Age: 68
DRG: 870 | End: 2023-09-02
Payer: MEDICARE

## 2023-09-02 LAB
ALBUMIN SERPL-MCNC: 3.5 G/DL (ref 3.5–5.2)
ALP SERPL-CCNC: 129 U/L (ref 35–104)
ALT SERPL-CCNC: 29 U/L (ref 0–32)
ANION GAP SERPL CALCULATED.3IONS-SCNC: 10 MMOL/L (ref 7–16)
ANION GAP SERPL CALCULATED.3IONS-SCNC: 11 MMOL/L (ref 7–16)
ANION GAP SERPL CALCULATED.3IONS-SCNC: 12 MMOL/L (ref 7–16)
ANION GAP SERPL CALCULATED.3IONS-SCNC: 13 MMOL/L (ref 7–16)
ANION GAP SERPL CALCULATED.3IONS-SCNC: 8 MMOL/L (ref 7–16)
ANION GAP SERPL CALCULATED.3IONS-SCNC: 9 MMOL/L (ref 7–16)
ANION GAP SERPL CALCULATED.3IONS-SCNC: 9 MMOL/L (ref 7–16)
AST SERPL-CCNC: 18 U/L (ref 0–31)
BASOPHILS # BLD: 0 K/UL (ref 0–0.2)
BASOPHILS NFR BLD: 0 % (ref 0–2)
BILIRUB SERPL-MCNC: 0.4 MG/DL (ref 0–1.2)
BUN SERPL-MCNC: 17 MG/DL (ref 6–23)
BUN SERPL-MCNC: 18 MG/DL (ref 6–23)
BUN SERPL-MCNC: 18 MG/DL (ref 6–23)
BUN SERPL-MCNC: 19 MG/DL (ref 6–23)
BUN SERPL-MCNC: 20 MG/DL (ref 6–23)
CALCIUM SERPL-MCNC: 8.6 MG/DL (ref 8.6–10.2)
CALCIUM SERPL-MCNC: 8.6 MG/DL (ref 8.6–10.2)
CALCIUM SERPL-MCNC: 8.7 MG/DL (ref 8.6–10.2)
CALCIUM SERPL-MCNC: 8.8 MG/DL (ref 8.6–10.2)
CALCIUM SERPL-MCNC: 8.9 MG/DL (ref 8.6–10.2)
CALCIUM SERPL-MCNC: 8.9 MG/DL (ref 8.6–10.2)
CALCIUM SERPL-MCNC: 9 MG/DL (ref 8.6–10.2)
CHLORIDE SERPL-SCNC: 102 MMOL/L (ref 98–107)
CHLORIDE SERPL-SCNC: 103 MMOL/L (ref 98–107)
CHLORIDE SERPL-SCNC: 104 MMOL/L (ref 98–107)
CHLORIDE SERPL-SCNC: 104 MMOL/L (ref 98–107)
CHLORIDE SERPL-SCNC: 105 MMOL/L (ref 98–107)
CHLORIDE SERPL-SCNC: 106 MMOL/L (ref 98–107)
CHLORIDE SERPL-SCNC: 106 MMOL/L (ref 98–107)
CO2 SERPL-SCNC: 25 MMOL/L (ref 22–29)
CO2 SERPL-SCNC: 26 MMOL/L (ref 22–29)
CO2 SERPL-SCNC: 27 MMOL/L (ref 22–29)
CO2 SERPL-SCNC: 28 MMOL/L (ref 22–29)
CO2 SERPL-SCNC: 29 MMOL/L (ref 22–29)
CREAT SERPL-MCNC: 1 MG/DL (ref 0.5–1)
CREAT SERPL-MCNC: 1 MG/DL (ref 0.5–1)
CREAT SERPL-MCNC: 1.1 MG/DL (ref 0.5–1)
EOSINOPHIL # BLD: 0.08 K/UL (ref 0.05–0.5)
EOSINOPHILS RELATIVE PERCENT: 1 % (ref 0–6)
ERYTHROCYTE [DISTWIDTH] IN BLOOD BY AUTOMATED COUNT: 18.3 % (ref 11.5–15)
GFR SERPL CREATININE-BSD FRML MDRD: 54 ML/MIN/1.73M2
GFR SERPL CREATININE-BSD FRML MDRD: 58 ML/MIN/1.73M2
GFR SERPL CREATININE-BSD FRML MDRD: 58 ML/MIN/1.73M2
GFR SERPL CREATININE-BSD FRML MDRD: 60 ML/MIN/1.73M2
GFR SERPL CREATININE-BSD FRML MDRD: >60 ML/MIN/1.73M2
GLUCOSE SERPL-MCNC: 100 MG/DL (ref 74–99)
GLUCOSE SERPL-MCNC: 73 MG/DL (ref 74–99)
GLUCOSE SERPL-MCNC: 76 MG/DL (ref 74–99)
GLUCOSE SERPL-MCNC: 76 MG/DL (ref 74–99)
GLUCOSE SERPL-MCNC: 85 MG/DL (ref 74–99)
GLUCOSE SERPL-MCNC: 89 MG/DL (ref 74–99)
GLUCOSE SERPL-MCNC: 98 MG/DL (ref 74–99)
HCT VFR BLD AUTO: 29.2 % (ref 34–48)
HGB BLD-MCNC: 9 G/DL (ref 11.5–15.5)
IMM GRANULOCYTES # BLD AUTO: 0.06 K/UL (ref 0–0.58)
IMM GRANULOCYTES NFR BLD: 1 % (ref 0–5)
LYMPHOCYTES NFR BLD: 0.47 K/UL (ref 1.5–4)
LYMPHOCYTES RELATIVE PERCENT: 7 % (ref 20–42)
MAGNESIUM SERPL-MCNC: 1.8 MG/DL (ref 1.6–2.6)
MAGNESIUM SERPL-MCNC: 1.9 MG/DL (ref 1.6–2.6)
MAGNESIUM SERPL-MCNC: 2 MG/DL (ref 1.6–2.6)
MCH RBC QN AUTO: 26.2 PG (ref 26–35)
MCHC RBC AUTO-ENTMCNC: 30.8 G/DL (ref 32–34.5)
MCV RBC AUTO: 85.1 FL (ref 80–99.9)
MONOCYTES NFR BLD: 0.62 K/UL (ref 0.1–0.95)
MONOCYTES NFR BLD: 9 % (ref 2–12)
NEUTROPHILS NFR BLD: 82 % (ref 43–80)
NEUTS SEG NFR BLD: 5.56 K/UL (ref 1.8–7.3)
PHOSPHATE SERPL-MCNC: 2.9 MG/DL (ref 2.5–4.5)
PHOSPHATE SERPL-MCNC: 3 MG/DL (ref 2.5–4.5)
PHOSPHATE SERPL-MCNC: 3.1 MG/DL (ref 2.5–4.5)
PLATELET # BLD AUTO: 182 K/UL (ref 130–450)
PMV BLD AUTO: 11.1 FL (ref 7–12)
POTASSIUM SERPL-SCNC: 3.4 MMOL/L (ref 3.5–5)
POTASSIUM SERPL-SCNC: 3.5 MMOL/L (ref 3.5–5)
POTASSIUM SERPL-SCNC: 3.5 MMOL/L (ref 3.5–5)
POTASSIUM SERPL-SCNC: 3.6 MMOL/L (ref 3.5–5)
POTASSIUM SERPL-SCNC: 3.7 MMOL/L (ref 3.5–5)
PROT SERPL-MCNC: 6 G/DL (ref 6.4–8.3)
RBC # BLD AUTO: 3.43 M/UL (ref 3.5–5.5)
RBC # BLD: ABNORMAL 10*6/UL
SODIUM SERPL-SCNC: 139 MMOL/L (ref 132–146)
SODIUM SERPL-SCNC: 141 MMOL/L (ref 132–146)
SODIUM SERPL-SCNC: 141 MMOL/L (ref 132–146)
SODIUM SERPL-SCNC: 142 MMOL/L (ref 132–146)
SODIUM SERPL-SCNC: 144 MMOL/L (ref 132–146)
WBC OTHER # BLD: 6.8 K/UL (ref 4.5–11.5)

## 2023-09-02 PROCEDURE — 6370000000 HC RX 637 (ALT 250 FOR IP)

## 2023-09-02 PROCEDURE — 6370000000 HC RX 637 (ALT 250 FOR IP): Performed by: INTERNAL MEDICINE

## 2023-09-02 PROCEDURE — 80053 COMPREHEN METABOLIC PANEL: CPT

## 2023-09-02 PROCEDURE — 94660 CPAP INITIATION&MGMT: CPT

## 2023-09-02 PROCEDURE — 2060000000 HC ICU INTERMEDIATE R&B

## 2023-09-02 PROCEDURE — 2700000000 HC OXYGEN THERAPY PER DAY

## 2023-09-02 PROCEDURE — 2500000003 HC RX 250 WO HCPCS: Performed by: INTERNAL MEDICINE

## 2023-09-02 PROCEDURE — 6360000002 HC RX W HCPCS: Performed by: INTERNAL MEDICINE

## 2023-09-02 PROCEDURE — 80048 BASIC METABOLIC PNL TOTAL CA: CPT

## 2023-09-02 PROCEDURE — 2580000003 HC RX 258

## 2023-09-02 PROCEDURE — 83735 ASSAY OF MAGNESIUM: CPT

## 2023-09-02 PROCEDURE — 94640 AIRWAY INHALATION TREATMENT: CPT

## 2023-09-02 PROCEDURE — 85025 COMPLETE CBC W/AUTO DIFF WBC: CPT

## 2023-09-02 PROCEDURE — 72128 CT CHEST SPINE W/O DYE: CPT

## 2023-09-02 PROCEDURE — A4216 STERILE WATER/SALINE, 10 ML: HCPCS

## 2023-09-02 PROCEDURE — 6360000002 HC RX W HCPCS

## 2023-09-02 PROCEDURE — 99232 SBSQ HOSP IP/OBS MODERATE 35: CPT | Performed by: FAMILY MEDICINE

## 2023-09-02 PROCEDURE — 93005 ELECTROCARDIOGRAM TRACING: CPT | Performed by: STUDENT IN AN ORGANIZED HEALTH CARE EDUCATION/TRAINING PROGRAM

## 2023-09-02 PROCEDURE — C9113 INJ PANTOPRAZOLE SODIUM, VIA: HCPCS

## 2023-09-02 PROCEDURE — 84100 ASSAY OF PHOSPHORUS: CPT

## 2023-09-02 PROCEDURE — 2580000003 HC RX 258: Performed by: INTERNAL MEDICINE

## 2023-09-02 PROCEDURE — 72131 CT LUMBAR SPINE W/O DYE: CPT

## 2023-09-02 RX ADMIN — IPRATROPIUM BROMIDE AND ALBUTEROL SULFATE 1 DOSE: .5; 2.5 SOLUTION RESPIRATORY (INHALATION) at 16:44

## 2023-09-02 RX ADMIN — HYDRALAZINE HYDROCHLORIDE 25 MG: 25 TABLET, FILM COATED ORAL at 08:08

## 2023-09-02 RX ADMIN — Medication 10 ML: at 08:08

## 2023-09-02 RX ADMIN — LEVETIRACETAM 500 MG: 100 INJECTION INTRAVENOUS at 22:57

## 2023-09-02 RX ADMIN — SODIUM CHLORIDE, PRESERVATIVE FREE 10 ML: 5 INJECTION INTRAVENOUS at 12:03

## 2023-09-02 RX ADMIN — HYDRALAZINE HYDROCHLORIDE 10 MG: 20 INJECTION, SOLUTION INTRAMUSCULAR; INTRAVENOUS at 08:04

## 2023-09-02 RX ADMIN — Medication 250 MG: at 08:07

## 2023-09-02 RX ADMIN — ARFORMOTEROL TARTRATE 15 MCG: 15 SOLUTION RESPIRATORY (INHALATION) at 08:50

## 2023-09-02 RX ADMIN — METOPROLOL TARTRATE 2.5 MG: 5 INJECTION, SOLUTION INTRAVENOUS at 17:05

## 2023-09-02 RX ADMIN — LEVETIRACETAM 500 MG: 100 INJECTION INTRAVENOUS at 08:07

## 2023-09-02 RX ADMIN — METOPROLOL TARTRATE 2.5 MG: 5 INJECTION, SOLUTION INTRAVENOUS at 22:56

## 2023-09-02 RX ADMIN — METOPROLOL TARTRATE 2.5 MG: 5 INJECTION, SOLUTION INTRAVENOUS at 01:27

## 2023-09-02 RX ADMIN — ARFORMOTEROL TARTRATE 15 MCG: 15 SOLUTION RESPIRATORY (INHALATION) at 19:49

## 2023-09-02 RX ADMIN — STANDARDIZED SENNA CONCENTRATE 8.6 MG: 8.6 TABLET ORAL at 22:57

## 2023-09-02 RX ADMIN — HYDRALAZINE HYDROCHLORIDE 25 MG: 25 TABLET, FILM COATED ORAL at 22:57

## 2023-09-02 RX ADMIN — HEPARIN 300 UNITS: 100 SYRINGE at 08:07

## 2023-09-02 RX ADMIN — ACETAMINOPHEN 650 MG: 325 TABLET ORAL at 08:08

## 2023-09-02 RX ADMIN — APIXABAN 5 MG: 5 TABLET, FILM COATED ORAL at 22:57

## 2023-09-02 RX ADMIN — ACETAMINOPHEN 650 MG: 325 TABLET ORAL at 14:51

## 2023-09-02 RX ADMIN — IPRATROPIUM BROMIDE AND ALBUTEROL SULFATE 1 DOSE: .5; 2.5 SOLUTION RESPIRATORY (INHALATION) at 19:49

## 2023-09-02 RX ADMIN — LEVOTHYROXINE SODIUM 75 MCG: 0.07 TABLET ORAL at 06:56

## 2023-09-02 RX ADMIN — APIXABAN 5 MG: 5 TABLET, FILM COATED ORAL at 08:07

## 2023-09-02 RX ADMIN — AMIODARONE HYDROCHLORIDE 200 MG: 200 TABLET ORAL at 08:07

## 2023-09-02 RX ADMIN — SODIUM CHLORIDE, PRESERVATIVE FREE 10 ML: 5 INJECTION INTRAVENOUS at 17:05

## 2023-09-02 RX ADMIN — BUDESONIDE INHALATION 500 MCG: 0.5 SUSPENSION RESPIRATORY (INHALATION) at 08:50

## 2023-09-02 RX ADMIN — Medication 250 MG: at 01:27

## 2023-09-02 RX ADMIN — METOPROLOL TARTRATE 2.5 MG: 5 INJECTION, SOLUTION INTRAVENOUS at 12:02

## 2023-09-02 RX ADMIN — Medication 250 MG: at 17:05

## 2023-09-02 RX ADMIN — BUDESONIDE INHALATION 500 MCG: 0.5 SUSPENSION RESPIRATORY (INHALATION) at 19:49

## 2023-09-02 RX ADMIN — IPRATROPIUM BROMIDE AND ALBUTEROL SULFATE 1 DOSE: .5; 2.5 SOLUTION RESPIRATORY (INHALATION) at 08:51

## 2023-09-02 RX ADMIN — POTASSIUM CHLORIDE: 2 INJECTION, SOLUTION, CONCENTRATE INTRAVENOUS at 01:55

## 2023-09-02 RX ADMIN — IPRATROPIUM BROMIDE AND ALBUTEROL SULFATE 1 DOSE: .5; 2.5 SOLUTION RESPIRATORY (INHALATION) at 11:42

## 2023-09-02 RX ADMIN — METOPROLOL TARTRATE 2.5 MG: 5 INJECTION, SOLUTION INTRAVENOUS at 06:56

## 2023-09-02 RX ADMIN — Medication 10 ML: at 22:56

## 2023-09-02 RX ADMIN — SODIUM CHLORIDE, PRESERVATIVE FREE 40 MG: 5 INJECTION INTRAVENOUS at 08:06

## 2023-09-02 RX ADMIN — HEPARIN 300 UNITS: 100 SYRINGE at 22:57

## 2023-09-02 ASSESSMENT — PAIN SCALES - GENERAL
PAINLEVEL_OUTOF10: 3
PAINLEVEL_OUTOF10: 9
PAINLEVEL_OUTOF10: 0

## 2023-09-02 ASSESSMENT — PAIN DESCRIPTION - LOCATION
LOCATION: GENERALIZED
LOCATION: CHEST

## 2023-09-02 ASSESSMENT — PAIN DESCRIPTION - DESCRIPTORS
DESCRIPTORS: ACHING
DESCRIPTORS: ACHING

## 2023-09-02 ASSESSMENT — PAIN DESCRIPTION - ORIENTATION: ORIENTATION: MID

## 2023-09-02 NOTE — PROGRESS NOTES
Date: 9/2/2023    Time: 7:56 PM    Patient Placed On BIPAP/CPAP/ Non-Invasive Ventilation? Yes    If no must comment. Facial area red/color change? No           If YES are Blister/Lesion present? No   If yes must notify nursing staff  BIPAP/CPAP skin barrier?   Yes    Skin barrier type:mepilexlite       Comments: patient wanted to go on the [address] [place] [address], [facility]

## 2023-09-02 NOTE — PROGRESS NOTES
09/01/23 2221   NIV Type   NIV Started/Stopped On   Equipment Type v60   Mode AVAPS   Mask Type Full face mask   Mask Size Small   Assessment   Level of Consciousness 0   Comfort Level Good   Using Accessory Muscles Yes   Mask Compliance Good   Skin Assessment Clean, dry, & intact   Skin Protection for O2 Device Yes   Settings/Measurements   PIP Observed 15 cm H20   CPAP/EPAP 8 cmH2O   IPAP Min 10 cmH2O   IPAP Max 20 cmH2O   Vt (Set, mL) 400 mL   Vt (Measured) 384 mL   Rate Ordered 16   Insp Rise Time (%) 3 %   FiO2  40 %   I Time/ I Time % 0.75 s   Minute Volume (L/min) 7.7 Liters   Mask Leak (lpm) 43 lpm     Date: 9/1/2023    Time: 10:34 PM    Patient Placed On BIPAP/CPAP/ Non-Invasive Ventilation? Yes    If no must comment. Facial area red/color change? No           If YES are Blister/Lesion present? No   If yes must notify nursing staff  BIPAP/CPAP skin barrier? Yes    Skin barrier type:mepilexlite       Comments:  Mepilex placed on nose. Patient placed on BiPAP by nurse. No issues.       Alyssa Bejarano RCP

## 2023-09-02 NOTE — PROGRESS NOTES
[facility] Inpatient   Resident Progress Note    S:  Hospital day: 14    Brief Synopsis: Dianna Gary is a 78 y.o. female with a past medical history of pulmonary sarcoidosis, atrial fibrillation on Eliquis, pulmonary hypertension, diabetes insipidus, hypothyroidism, hypertension, chronic kidney disease stage IIIa and combined systolic/diastolic heart failure who presents to the emergency department for shortness of breath. Patient was recently discharged from [facility] (08/18/23) due to acute hypoxic respiratory failure. Patient has had numerous hospital admissions for the same complaint. Hypotensive, tachycardic and hypoxic at presentation. She did decompensate and ended up in PEA. 2 rounds of CPR were performed and ROSC achieved. Patient admitted to the intensive care unit for acute hypoxic respiratory failure requiring intubation and vasopressors. No acute events overnight. She has been weaned down to 10 L HFNC. Continues to endorse same chest discomfort that is not worse than usual.   Able to eat some food with assistance yesterday.        Cont meds:    IV infusion builder 80 mL/hr at 09/02/23 0155    sodium chloride      sodium chloride Stopped (08/29/23 1449)    dextrose       Scheduled meds:    apixaban  5 mg Oral BID    ipratropium 0.5 mg-albuterol 2.5 mg  1 Dose Inhalation Q4H WA RT    metoprolol  2.5 mg IntraVENous Q6H    lidocaine  1 patch TransDERmal Daily    [Held by provider] metoprolol tartrate  25 mg Oral BID    [START ON 9/3/2023] predniSONE  5 mg Oral Daily    levETIRAcetam  500 mg IntraVENous Q12H    potassium & sodium phosphates  1 packet Per NG tube q8h    [Held by provider] levETIRAcetam  500 mg Oral BID    lidocaine PF  5 mL IntraDERmal Once    sodium chloride flush  5-40 mL IntraVENous 2 times per day    heparin flush  3 mL IntraVENous 2 times per day    amiodarone  200 mg Oral Daily    budesonide  500 mcg Nebulization BID RT    arformoterol tartrate  15 mcg Status: She is alert and oriented to person, place, and time. Comments:        Psychiatric:         Mood and Affect: Mood normal.        Labs:  Na/K/Cl/CO2:  144, 142/3.7, 3.6/106, 106/27, 28 (09/02 0600)  BUN/Cr/glu/ALT/AST/amyl/lip:  18, 18/1.0, 1.0/--/29/18/--/-- (09/02 0600)  WBC/Hgb/Hct/Plts:  6.8/9.0/29.2/182 (09/02 0600)  estimated creatinine clearance is 49 mL/min (based on SCr of 1 mg/dL). Other pertinent labs as noted below    New Imaging:  FL MODIFIED BARIUM SWALLOW W VIDEO   Final Result   Laryngeal penetration of thin and nectar thick liquid barium. No evidence of aspiration. Please see separate speech pathology report for full discussion of findings   and recommendations. XR CHEST PORTABLE   Final Result   No significant change in the appearance of chest.         XR CHEST PORTABLE   Final Result   1. Mild pulmonary venous hypertension and bilateral interstitial prominence. The appearance of the chest is improved compared 08/29/2023.      2.  Stable prominence of the hilar vasculature      3. Left PICC line catheter in good position. XR CHEST PORTABLE   Final Result   1. Interval removal of endotracheal tube and gastric catheters. 2.  Medial left lower lobe subsegmental atelectasis      3. Pulmonary venous hypertension and mild interstitial pulmonary edema,   slightly improved compared to the 08/26/2023. XR CHEST PORTABLE   Final Result   1. The tip of the endotracheal tube is low lying   2. Increased left basilar density concerning for worsening effusion and or   atelectasis   3. Interstitial prominence concerning for mild pulmonary edema or fluid   overload, unchanged         XR ABDOMEN FOR NG/OG/NE TUBE PLACEMENT   Final Result   Satisfactory position of nasogastric tube. XR CHEST PORTABLE   Final Result   Stable appearance of the chest compared to 08/24/2023.          XR CHEST PORTABLE   Final Result   No significant change         MRI BRAIN evaluation for thoracic fracture,appeal submitted for 9/5.       Electronically signed by John Rowe MD on 9/2/2023 at 7:30 AM  This case was discussed with attending physician Dr. Nathanael Humphreys

## 2023-09-02 NOTE — PROGRESS NOTES
BP was 192/114 manual. PRN hydralazine given and morning medicine given. Repeat /89 Dr. Joann Yoon notified via PS.

## 2023-09-02 NOTE — PROGRESS NOTES
History and physical reviewed, chart reviewed, reason for consultation is clearance to participate with therapy    Patient is pleasantly confused but opened her eyes follows simple commands did complain of thoracolumbar junction back pain pointed to her epigastrium when asked where the pain was    Reviewed prior films there were no CTs of the lumbar or thoracic spine available recently but did review the CT of the abdomen and pelvis documenting ET 10 kyphoplasty as well as a T12 fracture likely all old    CT scan of the thoracic and lumbar spine ordered to have a more recent radiographic evaluation of the spine before allowing the patient to participate with therapy    We will follow with you

## 2023-09-03 LAB
ALBUMIN SERPL-MCNC: 3.2 G/DL (ref 3.5–5.2)
ALP SERPL-CCNC: 131 U/L (ref 35–104)
ALT SERPL-CCNC: 24 U/L (ref 0–32)
ANION GAP SERPL CALCULATED.3IONS-SCNC: 8 MMOL/L (ref 7–16)
AST SERPL-CCNC: 19 U/L (ref 0–31)
BASOPHILS # BLD: 0 K/UL (ref 0–0.2)
BASOPHILS NFR BLD: 0 % (ref 0–2)
BILIRUB SERPL-MCNC: 0.5 MG/DL (ref 0–1.2)
BUN SERPL-MCNC: 14 MG/DL (ref 6–23)
CALCIUM SERPL-MCNC: 8.4 MG/DL (ref 8.6–10.2)
CHLORIDE SERPL-SCNC: 110 MMOL/L (ref 98–107)
CO2 SERPL-SCNC: 28 MMOL/L (ref 22–29)
CREAT SERPL-MCNC: 1 MG/DL (ref 0.5–1)
EKG ATRIAL RATE: 88 BPM
EKG ATRIAL RATE: 91 BPM
EKG P AXIS: 55 DEGREES
EKG P AXIS: 63 DEGREES
EKG P-R INTERVAL: 122 MS
EKG P-R INTERVAL: 126 MS
EKG Q-T INTERVAL: 372 MS
EKG Q-T INTERVAL: 396 MS
EKG QRS DURATION: 90 MS
EKG QRS DURATION: 90 MS
EKG QTC CALCULATION (BAZETT): 450 MS
EKG QTC CALCULATION (BAZETT): 487 MS
EKG R AXIS: 86 DEGREES
EKG R AXIS: 88 DEGREES
EKG T AXIS: 103 DEGREES
EKG T AXIS: 169 DEGREES
EKG VENTRICULAR RATE: 88 BPM
EKG VENTRICULAR RATE: 91 BPM
EOSINOPHIL # BLD: 0 K/UL (ref 0.05–0.5)
EOSINOPHILS RELATIVE PERCENT: 0 % (ref 0–6)
ERYTHROCYTE [DISTWIDTH] IN BLOOD BY AUTOMATED COUNT: 18.5 % (ref 11.5–15)
GFR SERPL CREATININE-BSD FRML MDRD: >60 ML/MIN/1.73M2
GLUCOSE BLD-MCNC: 158 MG/DL (ref 74–99)
GLUCOSE SERPL-MCNC: 70 MG/DL (ref 74–99)
HCT VFR BLD AUTO: 30.3 % (ref 34–48)
HGB BLD-MCNC: 9.2 G/DL (ref 11.5–15.5)
LYMPHOCYTES NFR BLD: 0.25 K/UL (ref 1.5–4)
LYMPHOCYTES RELATIVE PERCENT: 4 % (ref 20–42)
MCH RBC QN AUTO: 26.1 PG (ref 26–35)
MCHC RBC AUTO-ENTMCNC: 30.4 G/DL (ref 32–34.5)
MCV RBC AUTO: 86.1 FL (ref 80–99.9)
MONOCYTES NFR BLD: 0.31 K/UL (ref 0.1–0.95)
MONOCYTES NFR BLD: 4 % (ref 2–12)
NEUTROPHILS NFR BLD: 92 % (ref 43–80)
NEUTS SEG NFR BLD: 6.45 K/UL (ref 1.8–7.3)
PLATELET # BLD AUTO: 160 K/UL (ref 130–450)
PMV BLD AUTO: 10.2 FL (ref 7–12)
POTASSIUM SERPL-SCNC: 3.7 MMOL/L (ref 3.5–5)
PROT SERPL-MCNC: 5.6 G/DL (ref 6.4–8.3)
RBC # BLD AUTO: 3.52 M/UL (ref 3.5–5.5)
RBC # BLD: ABNORMAL 10*6/UL
SODIUM SERPL-SCNC: 146 MMOL/L (ref 132–146)
WBC OTHER # BLD: 7 K/UL (ref 4.5–11.5)

## 2023-09-03 PROCEDURE — 6360000002 HC RX W HCPCS

## 2023-09-03 PROCEDURE — 94660 CPAP INITIATION&MGMT: CPT

## 2023-09-03 PROCEDURE — 6360000002 HC RX W HCPCS: Performed by: INTERNAL MEDICINE

## 2023-09-03 PROCEDURE — 93010 ELECTROCARDIOGRAM REPORT: CPT | Performed by: INTERNAL MEDICINE

## 2023-09-03 PROCEDURE — 2700000000 HC OXYGEN THERAPY PER DAY

## 2023-09-03 PROCEDURE — 6370000000 HC RX 637 (ALT 250 FOR IP)

## 2023-09-03 PROCEDURE — 2060000000 HC ICU INTERMEDIATE R&B

## 2023-09-03 PROCEDURE — 80053 COMPREHEN METABOLIC PANEL: CPT

## 2023-09-03 PROCEDURE — 99232 SBSQ HOSP IP/OBS MODERATE 35: CPT | Performed by: FAMILY MEDICINE

## 2023-09-03 PROCEDURE — 94640 AIRWAY INHALATION TREATMENT: CPT

## 2023-09-03 PROCEDURE — 2500000003 HC RX 250 WO HCPCS: Performed by: INTERNAL MEDICINE

## 2023-09-03 PROCEDURE — 2580000003 HC RX 258

## 2023-09-03 PROCEDURE — 82962 GLUCOSE BLOOD TEST: CPT

## 2023-09-03 PROCEDURE — C9113 INJ PANTOPRAZOLE SODIUM, VIA: HCPCS

## 2023-09-03 PROCEDURE — 6370000000 HC RX 637 (ALT 250 FOR IP): Performed by: STUDENT IN AN ORGANIZED HEALTH CARE EDUCATION/TRAINING PROGRAM

## 2023-09-03 PROCEDURE — 85025 COMPLETE CBC W/AUTO DIFF WBC: CPT

## 2023-09-03 PROCEDURE — A4216 STERILE WATER/SALINE, 10 ML: HCPCS

## 2023-09-03 PROCEDURE — 6370000000 HC RX 637 (ALT 250 FOR IP): Performed by: INTERNAL MEDICINE

## 2023-09-03 PROCEDURE — 2580000003 HC RX 258: Performed by: INTERNAL MEDICINE

## 2023-09-03 RX ORDER — TIZANIDINE 4 MG/1
4 TABLET ORAL EVERY 6 HOURS PRN
Status: DISCONTINUED | OUTPATIENT
Start: 2023-09-03 | End: 2023-09-05

## 2023-09-03 RX ORDER — HYDROXYZINE PAMOATE 25 MG/1
25 CAPSULE ORAL 3 TIMES DAILY PRN
Status: DISCONTINUED | OUTPATIENT
Start: 2023-09-03 | End: 2023-09-05

## 2023-09-03 RX ORDER — METHOCARBAMOL 750 MG/1
750 TABLET, FILM COATED ORAL ONCE
Status: CANCELLED | OUTPATIENT
Start: 2023-09-03

## 2023-09-03 RX ADMIN — Medication 250 MG: at 16:55

## 2023-09-03 RX ADMIN — TIZANIDINE 4 MG: 4 TABLET ORAL at 10:19

## 2023-09-03 RX ADMIN — ARFORMOTEROL TARTRATE 15 MCG: 15 SOLUTION RESPIRATORY (INHALATION) at 20:23

## 2023-09-03 RX ADMIN — SODIUM CHLORIDE, PRESERVATIVE FREE 40 MG: 5 INJECTION INTRAVENOUS at 09:11

## 2023-09-03 RX ADMIN — METOPROLOL TARTRATE 2.5 MG: 5 INJECTION, SOLUTION INTRAVENOUS at 23:45

## 2023-09-03 RX ADMIN — HYDROXYZINE PAMOATE 25 MG: 25 CAPSULE ORAL at 21:35

## 2023-09-03 RX ADMIN — Medication 10 ML: at 09:10

## 2023-09-03 RX ADMIN — Medication 250 MG: at 23:45

## 2023-09-03 RX ADMIN — LEVETIRACETAM 500 MG: 100 INJECTION INTRAVENOUS at 21:52

## 2023-09-03 RX ADMIN — ARFORMOTEROL TARTRATE 15 MCG: 15 SOLUTION RESPIRATORY (INHALATION) at 09:21

## 2023-09-03 RX ADMIN — IPRATROPIUM BROMIDE AND ALBUTEROL SULFATE 1 DOSE: .5; 2.5 SOLUTION RESPIRATORY (INHALATION) at 15:35

## 2023-09-03 RX ADMIN — SODIUM CHLORIDE, PRESERVATIVE FREE 10 ML: 5 INJECTION INTRAVENOUS at 12:16

## 2023-09-03 RX ADMIN — LEVOTHYROXINE SODIUM 75 MCG: 0.07 TABLET ORAL at 06:41

## 2023-09-03 RX ADMIN — SODIUM CHLORIDE, PRESERVATIVE FREE 10 ML: 5 INJECTION INTRAVENOUS at 21:34

## 2023-09-03 RX ADMIN — METOPROLOL TARTRATE 2.5 MG: 5 INJECTION, SOLUTION INTRAVENOUS at 06:40

## 2023-09-03 RX ADMIN — BUDESONIDE INHALATION 500 MCG: 0.5 SUSPENSION RESPIRATORY (INHALATION) at 09:21

## 2023-09-03 RX ADMIN — ACETAMINOPHEN 650 MG: 325 TABLET ORAL at 23:37

## 2023-09-03 RX ADMIN — METOPROLOL TARTRATE 2.5 MG: 5 INJECTION, SOLUTION INTRAVENOUS at 12:15

## 2023-09-03 RX ADMIN — APIXABAN 5 MG: 5 TABLET, FILM COATED ORAL at 09:09

## 2023-09-03 RX ADMIN — BUDESONIDE INHALATION 500 MCG: 0.5 SUSPENSION RESPIRATORY (INHALATION) at 20:23

## 2023-09-03 RX ADMIN — HEPARIN 300 UNITS: 100 SYRINGE at 21:35

## 2023-09-03 RX ADMIN — APIXABAN 5 MG: 5 TABLET, FILM COATED ORAL at 21:35

## 2023-09-03 RX ADMIN — HEPARIN 300 UNITS: 100 SYRINGE at 09:09

## 2023-09-03 RX ADMIN — TIZANIDINE 4 MG: 4 TABLET ORAL at 21:35

## 2023-09-03 RX ADMIN — HYDROXYZINE PAMOATE 25 MG: 25 CAPSULE ORAL at 12:16

## 2023-09-03 RX ADMIN — LEVETIRACETAM 500 MG: 100 INJECTION INTRAVENOUS at 09:10

## 2023-09-03 RX ADMIN — IPRATROPIUM BROMIDE AND ALBUTEROL SULFATE 1 DOSE: .5; 2.5 SOLUTION RESPIRATORY (INHALATION) at 09:22

## 2023-09-03 RX ADMIN — POTASSIUM CHLORIDE: 2 INJECTION, SOLUTION, CONCENTRATE INTRAVENOUS at 19:44

## 2023-09-03 RX ADMIN — Medication 10 ML: at 09:11

## 2023-09-03 RX ADMIN — PREDNISONE 5 MG: 5 TABLET ORAL at 10:19

## 2023-09-03 RX ADMIN — Medication 10 ML: at 21:52

## 2023-09-03 RX ADMIN — Medication 250 MG: at 06:41

## 2023-09-03 RX ADMIN — HYDRALAZINE HYDROCHLORIDE 25 MG: 25 TABLET, FILM COATED ORAL at 21:35

## 2023-09-03 RX ADMIN — HYDRALAZINE HYDROCHLORIDE 25 MG: 25 TABLET, FILM COATED ORAL at 09:09

## 2023-09-03 RX ADMIN — STANDARDIZED SENNA CONCENTRATE 8.6 MG: 8.6 TABLET ORAL at 21:35

## 2023-09-03 RX ADMIN — IPRATROPIUM BROMIDE AND ALBUTEROL SULFATE 1 DOSE: .5; 2.5 SOLUTION RESPIRATORY (INHALATION) at 20:23

## 2023-09-03 RX ADMIN — METOPROLOL TARTRATE 2.5 MG: 5 INJECTION, SOLUTION INTRAVENOUS at 16:55

## 2023-09-03 RX ADMIN — AMIODARONE HYDROCHLORIDE 200 MG: 200 TABLET ORAL at 09:09

## 2023-09-03 RX ADMIN — Medication 250 MG: at 09:09

## 2023-09-03 ASSESSMENT — PAIN SCALES - GENERAL
PAINLEVEL_OUTOF10: 7
PAINLEVEL_OUTOF10: 4

## 2023-09-03 NOTE — PROGRESS NOTES
[facility] Inpatient   Resident Progress Note    S:  Hospital day: 15    Brief Synopsis: Silvia Davalos is a 78 y.o. female with a past medical history of pulmonary sarcoidosis, atrial fibrillation on Eliquis, pulmonary hypertension, diabetes insipidus, hypothyroidism, hypertension, chronic kidney disease stage IIIa and combined systolic/diastolic heart failure who presents to the emergency department for shortness of breath. Patient was recently discharged from [facility] (08/18/23) due to acute hypoxic respiratory failure. Patient has had numerous hospital admissions for the same complaint. Hypotensive, tachycardic and hypoxic at presentation. She did decompensate and ended up in PEA. 2 rounds of CPR were performed and ROSC achieved. Patient admitted to the intensive care unit for acute hypoxic respiratory failure requiring intubation and vasopressors. She was extubated and on Bipap 08/29. She was denied LTAC at [facility] due to subactue compression fracture seen on 08/19 on CT and insufficient nutritional status as she was NPO due to BiPaP. She remained on BiPap and was slowly weaned to [facility] which she tolerated well. She was reevaluated with a swallow eval and able to be on puree diet. Neurosurgery consulted for the compression fracture which was stable and consulted PT/OT. No acute events overnight. She has been weaned down to 6 L HFNC. Continues to endorse same chest discomfort that is not worse than usual.   Able to eat some food with assistance.       Cont meds:    IV infusion builder 80 mL/hr at 09/02/23 0155    sodium chloride      sodium chloride Stopped (08/29/23 1449)    dextrose       Scheduled meds:    apixaban  5 mg Oral BID    ipratropium 0.5 mg-albuterol 2.5 mg  1 Dose Inhalation Q4H WA RT    metoprolol  2.5 mg IntraVENous Q6H    lidocaine  1 patch TransDERmal Daily    [Held by provider] metoprolol tartrate  25 mg Oral BID    predniSONE  5 mg Oral Daily    levETIRAcetam  500 Toprol XL and hydralazine. Continue home medication Prednisone for Sarcoid      Chronic kidney disease  Hyponatremia and hypochloremia  History of diabetes insipidus  Nephro on board: on Kcl 20 in D5 80 cc/h     Constipation - improved   Continue polyethylene glycol and senna  CT abdomen/pelvis was unremarkable for any mechanical obstruction 05/38/96  Stable umbilical hernia; Gen surg consulted with suspecting cecal volvulus - Continue to monitor bowel movements with no acute surgical intervention at this time. Iron deficiency anemia  Leukopenia  Transfused 2 units RBC ON 8/23. monitor H/H and transfused if hemoglobin < 7  Consider restarting ferrous sulfate     Paroxysmal atrial fibrillation  Continue amiodarone 200 mg daily, Toprol XL, Eliquis     Compression fractures   CF L5 and T12 indicated in CTAP 8/19/23; appears to be chronic. PT on hold due to this. Neurosurgery evaluated patient:- fractures are stable. PT re-consulted    Chest Pain  2/2 chest compressions vs anxiety  Zanaflex 4mg  Hydroxyzine (vistaril) 25 mg PRN        PT/OT evaluation: pending  DVT/GI prophylaxis: Eliquis/protonix  Diet: pureed, honey thick liquids  Disposition: Continue current management. [facility] denied placement due to NPO status and neurosurgery evaluation for thoracic fracture,appeal submitted for 9/5.       Electronically signed by Jose Mayo MD on 9/3/2023 at 11:56 AM  This case was discussed with attending physician Dr. Yuniel Ray

## 2023-09-03 NOTE — PLAN OF CARE

## 2023-09-04 LAB
ALBUMIN SERPL-MCNC: 3.2 G/DL (ref 3.5–5.2)
ALP SERPL-CCNC: 130 U/L (ref 35–104)
ALT SERPL-CCNC: 22 U/L (ref 0–32)
ANION GAP SERPL CALCULATED.3IONS-SCNC: 6 MMOL/L (ref 7–16)
AST SERPL-CCNC: 18 U/L (ref 0–31)
BASOPHILS # BLD: 0 K/UL (ref 0–0.2)
BASOPHILS NFR BLD: 0 % (ref 0–2)
BILIRUB SERPL-MCNC: 0.4 MG/DL (ref 0–1.2)
BUN SERPL-MCNC: 15 MG/DL (ref 6–23)
CA-I BLD-SCNC: 1.15 MMOL/L (ref 1.15–1.33)
CALCIUM SERPL-MCNC: 8.4 MG/DL (ref 8.6–10.2)
CHLORIDE SERPL-SCNC: 112 MMOL/L (ref 98–107)
CO2 SERPL-SCNC: 28 MMOL/L (ref 22–29)
CREAT SERPL-MCNC: 0.9 MG/DL (ref 0.5–1)
EOSINOPHIL # BLD: 0.18 K/UL (ref 0.05–0.5)
EOSINOPHILS RELATIVE PERCENT: 3 % (ref 0–6)
ERYTHROCYTE [DISTWIDTH] IN BLOOD BY AUTOMATED COUNT: 18.6 % (ref 11.5–15)
GFR SERPL CREATININE-BSD FRML MDRD: >60 ML/MIN/1.73M2
GLUCOSE BLD-MCNC: 101 MG/DL (ref 74–99)
GLUCOSE BLD-MCNC: 84 MG/DL (ref 74–99)
GLUCOSE SERPL-MCNC: 76 MG/DL (ref 74–99)
HCT VFR BLD AUTO: 30.5 % (ref 34–48)
HGB BLD-MCNC: 9.2 G/DL (ref 11.5–15.5)
IMM GRANULOCYTES # BLD AUTO: 0.04 K/UL (ref 0–0.58)
IMM GRANULOCYTES NFR BLD: 1 % (ref 0–5)
LYMPHOCYTES NFR BLD: 0.47 K/UL (ref 1.5–4)
LYMPHOCYTES RELATIVE PERCENT: 7 % (ref 20–42)
MCH RBC QN AUTO: 26.3 PG (ref 26–35)
MCHC RBC AUTO-ENTMCNC: 30.2 G/DL (ref 32–34.5)
MCV RBC AUTO: 87.1 FL (ref 80–99.9)
MONOCYTES NFR BLD: 0.44 K/UL (ref 0.1–0.95)
MONOCYTES NFR BLD: 7 % (ref 2–12)
NEUTROPHILS NFR BLD: 83 % (ref 43–80)
NEUTS SEG NFR BLD: 5.32 K/UL (ref 1.8–7.3)
PLATELET # BLD AUTO: 162 K/UL (ref 130–450)
PMV BLD AUTO: 11 FL (ref 7–12)
POTASSIUM SERPL-SCNC: 3.8 MMOL/L (ref 3.5–5)
PROT SERPL-MCNC: 5.7 G/DL (ref 6.4–8.3)
RBC # BLD AUTO: 3.5 M/UL (ref 3.5–5.5)
RBC # BLD: ABNORMAL 10*6/UL
SODIUM SERPL-SCNC: 146 MMOL/L (ref 132–146)
WBC OTHER # BLD: 6.5 K/UL (ref 4.5–11.5)

## 2023-09-04 PROCEDURE — 6360000002 HC RX W HCPCS

## 2023-09-04 PROCEDURE — 99232 SBSQ HOSP IP/OBS MODERATE 35: CPT | Performed by: FAMILY MEDICINE

## 2023-09-04 PROCEDURE — 2700000000 HC OXYGEN THERAPY PER DAY

## 2023-09-04 PROCEDURE — 2580000003 HC RX 258

## 2023-09-04 PROCEDURE — 36592 COLLECT BLOOD FROM PICC: CPT

## 2023-09-04 PROCEDURE — 85025 COMPLETE CBC W/AUTO DIFF WBC: CPT

## 2023-09-04 PROCEDURE — 2060000000 HC ICU INTERMEDIATE R&B

## 2023-09-04 PROCEDURE — 2500000003 HC RX 250 WO HCPCS: Performed by: INTERNAL MEDICINE

## 2023-09-04 PROCEDURE — 6360000002 HC RX W HCPCS: Performed by: INTERNAL MEDICINE

## 2023-09-04 PROCEDURE — 94640 AIRWAY INHALATION TREATMENT: CPT

## 2023-09-04 PROCEDURE — C9113 INJ PANTOPRAZOLE SODIUM, VIA: HCPCS

## 2023-09-04 PROCEDURE — A4216 STERILE WATER/SALINE, 10 ML: HCPCS

## 2023-09-04 PROCEDURE — 6370000000 HC RX 637 (ALT 250 FOR IP)

## 2023-09-04 PROCEDURE — 80053 COMPREHEN METABOLIC PANEL: CPT

## 2023-09-04 PROCEDURE — 6370000000 HC RX 637 (ALT 250 FOR IP): Performed by: INTERNAL MEDICINE

## 2023-09-04 PROCEDURE — 6370000000 HC RX 637 (ALT 250 FOR IP): Performed by: STUDENT IN AN ORGANIZED HEALTH CARE EDUCATION/TRAINING PROGRAM

## 2023-09-04 PROCEDURE — 82962 GLUCOSE BLOOD TEST: CPT

## 2023-09-04 PROCEDURE — 94660 CPAP INITIATION&MGMT: CPT

## 2023-09-04 PROCEDURE — 82330 ASSAY OF CALCIUM: CPT

## 2023-09-04 PROCEDURE — 2580000003 HC RX 258: Performed by: INTERNAL MEDICINE

## 2023-09-04 RX ADMIN — HYDROXYZINE PAMOATE 25 MG: 25 CAPSULE ORAL at 11:58

## 2023-09-04 RX ADMIN — Medication 10 ML: at 10:38

## 2023-09-04 RX ADMIN — METOPROLOL TARTRATE 2.5 MG: 5 INJECTION, SOLUTION INTRAVENOUS at 18:38

## 2023-09-04 RX ADMIN — STANDARDIZED SENNA CONCENTRATE 8.6 MG: 8.6 TABLET ORAL at 20:49

## 2023-09-04 RX ADMIN — HEPARIN 300 UNITS: 100 SYRINGE at 10:15

## 2023-09-04 RX ADMIN — HYDRALAZINE HYDROCHLORIDE 25 MG: 25 TABLET, FILM COATED ORAL at 10:12

## 2023-09-04 RX ADMIN — SODIUM CHLORIDE, PRESERVATIVE FREE 40 MG: 5 INJECTION INTRAVENOUS at 10:14

## 2023-09-04 RX ADMIN — AMIODARONE HYDROCHLORIDE 200 MG: 200 TABLET ORAL at 10:12

## 2023-09-04 RX ADMIN — BUDESONIDE INHALATION 500 MCG: 0.5 SUSPENSION RESPIRATORY (INHALATION) at 08:09

## 2023-09-04 RX ADMIN — LEVETIRACETAM 500 MG: 100 INJECTION INTRAVENOUS at 10:13

## 2023-09-04 RX ADMIN — METOPROLOL TARTRATE 2.5 MG: 5 INJECTION, SOLUTION INTRAVENOUS at 11:52

## 2023-09-04 RX ADMIN — HYDRALAZINE HYDROCHLORIDE 25 MG: 25 TABLET, FILM COATED ORAL at 20:49

## 2023-09-04 RX ADMIN — LEVOTHYROXINE SODIUM 75 MCG: 0.07 TABLET ORAL at 06:11

## 2023-09-04 RX ADMIN — APIXABAN 5 MG: 5 TABLET, FILM COATED ORAL at 10:12

## 2023-09-04 RX ADMIN — APIXABAN 5 MG: 5 TABLET, FILM COATED ORAL at 20:49

## 2023-09-04 RX ADMIN — POTASSIUM CHLORIDE: 2 INJECTION, SOLUTION, CONCENTRATE INTRAVENOUS at 10:44

## 2023-09-04 RX ADMIN — IPRATROPIUM BROMIDE AND ALBUTEROL SULFATE 1 DOSE: .5; 2.5 SOLUTION RESPIRATORY (INHALATION) at 20:09

## 2023-09-04 RX ADMIN — SODIUM CHLORIDE, PRESERVATIVE FREE 10 ML: 5 INJECTION INTRAVENOUS at 18:38

## 2023-09-04 RX ADMIN — ARFORMOTEROL TARTRATE 15 MCG: 15 SOLUTION RESPIRATORY (INHALATION) at 20:10

## 2023-09-04 RX ADMIN — Medication 10 ML: at 20:49

## 2023-09-04 RX ADMIN — TIZANIDINE 4 MG: 4 TABLET ORAL at 10:17

## 2023-09-04 RX ADMIN — HEPARIN 300 UNITS: 100 SYRINGE at 20:50

## 2023-09-04 RX ADMIN — Medication 10 ML: at 10:16

## 2023-09-04 RX ADMIN — POTASSIUM CHLORIDE: 2 INJECTION, SOLUTION, CONCENTRATE INTRAVENOUS at 20:51

## 2023-09-04 RX ADMIN — BUDESONIDE INHALATION 500 MCG: 0.5 SUSPENSION RESPIRATORY (INHALATION) at 20:10

## 2023-09-04 RX ADMIN — ACETAMINOPHEN 650 MG: 325 TABLET ORAL at 11:57

## 2023-09-04 RX ADMIN — PREDNISONE 5 MG: 5 TABLET ORAL at 10:16

## 2023-09-04 RX ADMIN — METOPROLOL TARTRATE 2.5 MG: 5 INJECTION, SOLUTION INTRAVENOUS at 23:46

## 2023-09-04 RX ADMIN — ARFORMOTEROL TARTRATE 15 MCG: 15 SOLUTION RESPIRATORY (INHALATION) at 08:09

## 2023-09-04 RX ADMIN — IPRATROPIUM BROMIDE AND ALBUTEROL SULFATE 1 DOSE: .5; 2.5 SOLUTION RESPIRATORY (INHALATION) at 11:28

## 2023-09-04 RX ADMIN — HYDROXYZINE PAMOATE 25 MG: 25 CAPSULE ORAL at 20:49

## 2023-09-04 RX ADMIN — LEVETIRACETAM 500 MG: 100 INJECTION INTRAVENOUS at 20:49

## 2023-09-04 RX ADMIN — Medication 250 MG: at 10:12

## 2023-09-04 RX ADMIN — IPRATROPIUM BROMIDE AND ALBUTEROL SULFATE 1 DOSE: .5; 2.5 SOLUTION RESPIRATORY (INHALATION) at 15:40

## 2023-09-04 RX ADMIN — ACETAMINOPHEN 650 MG: 325 TABLET ORAL at 18:30

## 2023-09-04 RX ADMIN — IPRATROPIUM BROMIDE AND ALBUTEROL SULFATE 1 DOSE: .5; 2.5 SOLUTION RESPIRATORY (INHALATION) at 08:09

## 2023-09-04 RX ADMIN — METOPROLOL TARTRATE 2.5 MG: 5 INJECTION, SOLUTION INTRAVENOUS at 06:10

## 2023-09-04 ASSESSMENT — PAIN DESCRIPTION - LOCATION
LOCATION: CHEST

## 2023-09-04 ASSESSMENT — PAIN SCALES - GENERAL
PAINLEVEL_OUTOF10: 7
PAINLEVEL_OUTOF10: 7
PAINLEVEL_OUTOF10: 3
PAINLEVEL_OUTOF10: 1

## 2023-09-04 ASSESSMENT — PAIN SCALES - WONG BAKER
WONGBAKER_NUMERICALRESPONSE: 0
WONGBAKER_NUMERICALRESPONSE: 0

## 2023-09-04 ASSESSMENT — PAIN DESCRIPTION - ORIENTATION
ORIENTATION: LEFT;ANTERIOR
ORIENTATION: MID
ORIENTATION: LEFT;ANTERIOR
ORIENTATION: ANTERIOR

## 2023-09-04 ASSESSMENT — PAIN DESCRIPTION - DESCRIPTORS
DESCRIPTORS: ACHING;DISCOMFORT;DULL
DESCRIPTORS: ACHING

## 2023-09-04 NOTE — PROGRESS NOTES
mL/hr at 09/04/23 1044    sodium chloride      sodium chloride Stopped (08/29/23 1449)    dextrose       PRN Meds:.hydrOXYzine pamoate, tiZANidine, hydrALAZINE, perflutren lipid microspheres, sodium chloride, sodium chloride flush, sodium chloride, heparin flush, glucose, dextrose bolus **OR** dextrose bolus, glucagon (rDNA), dextrose, albuterol, sodium chloride flush, ondansetron **OR** ondansetron, acetaminophen **OR** acetaminophen      DATA:    CBC:   Lab Results   Component Value Date/Time    WBC 6.5 09/04/2023 06:42 AM    RBC 3.50 09/04/2023 06:42 AM    RBC 3.57 12/14/2021 09:57 AM    HGB 9.2 09/04/2023 06:42 AM    HCT 30.5 09/04/2023 06:42 AM    HCT 41.0 04/28/2023 02:54 PM    MCV 87.1 09/04/2023 06:42 AM    MCH 26.3 09/04/2023 06:42 AM    MCHC 30.2 09/04/2023 06:42 AM    RDW 18.6 09/04/2023 06:42 AM     09/04/2023 06:42 AM    MPV 11.0 09/04/2023 06:42 AM     CMP:    Lab Results   Component Value Date/Time     09/04/2023 06:42 AM    K 3.8 09/04/2023 06:42 AM    K 3.8 07/14/2023 04:12 AM     09/04/2023 06:42 AM    CO2 28 09/04/2023 06:42 AM    BUN 15 09/04/2023 06:42 AM    CREATININE 0.9 09/04/2023 06:42 AM    GFRAA >60 10/04/2022 06:02 AM    LABGLOM >60 09/04/2023 06:42 AM    GLUCOSE 76 09/04/2023 06:42 AM    GLUCOSE 116 05/02/2012 07:40 PM    PROT 5.7 09/04/2023 06:42 AM    LABALBU 3.2 09/04/2023 06:42 AM    LABALBU 3.6 05/02/2012 07:40 PM    CALCIUM 8.4 09/04/2023 06:42 AM    BILITOT 0.4 09/04/2023 06:42 AM    ALKPHOS 130 09/04/2023 06:42 AM    AST 18 09/04/2023 06:42 AM    ALT 22 09/04/2023 06:42 AM     Magnesium:    Lab Results   Component Value Date/Time    MG 2.0 09/02/2023 09:21 AM     Phosphorus:    Lab Results   Component Value Date/Time    PHOS 3.1 09/02/2023 09:21 AM       Radiology Review:      XR CHEST PORTABLE 8/21/23    IMPRESSION:  1. Improved bilateral areas of consolidation and atelectasis  2. Decreased pleural effusions  3.  Residual consolidative process at the left lung at home. On prednisone.   HTN, on metoprolol  HFpEF 60%, proBNP 16,648> 12,793> 04,777  Alkalemia with respiratory alkalosis and metabolic alkalosis.    ---------------------------------------------------------------  Status post PEA arrest  Respiratory failure, status post intubation,   Pulmonary HTN  PAF, on amiodarone  Hypothyroidism, on levothyroxine  H/o seizures, on levetiracetam  Anemia, normocytic, drop in H&H,  Nutrition, NPO     Plan:       Increase D5W +20 KCl to 100 cc/hour  Continue prednisone 5 mg p.o. daily  Continue to monitor renal function  Continue to monitor blood pressure  Strict intake and output      Electronically signed by Ken Sorto MD on 9/4/2023 at 11:29 AM

## 2023-09-04 NOTE — PROGRESS NOTES
Ionized calcium ordered drawn from PICC good blood return flushed with heparin as ordered and 20 ml of NS

## 2023-09-04 NOTE — PROGRESS NOTES
Date: 9/4/2023    Time: 1:57 AM    Patient Placed On BIPAP/CPAP/ Non-Invasive Ventilation? Yes    If no must comment. Facial area red/color change? No           If YES are Blister/Lesion present? No   If yes must notify nursing staff  BIPAP/CPAP skin barrier?   Yes    Skin barrier type:mepilexlite       Comments:        Missy Cardona RCP

## 2023-09-04 NOTE — PROGRESS NOTES
Date: 9/3/2023    Time: 10:58 PM    Patient Placed On BIPAP/CPAP/ Non-Invasive Ventilation? No    If no must comment.     Comments: patient refusing         Annette Campos RCP

## 2023-09-04 NOTE — PROGRESS NOTES
CHEST PORTABLE   Final Result   Progressing pulmonary edema. Interval clearing of left retrocardiac opacity. XR CHEST PORTABLE   Final Result   Central pulmonary artery hypertension. Left retrocardiac atelectasis or pneumonic consolidation. XR ABDOMEN FOR NG/OG/NE TUBE PLACEMENT   Final Result   Nasogastric tube identified tip in the stomach. Bowel gas pattern is   nonspecific. XR ABDOMEN FOR NG/OG/NE TUBE PLACEMENT   Final Result   Satisfactory position of NG tube. CT HEAD WO CONTRAST   Final Result   1. No acute intracranial abnormality. 2.  Signs of deep white matter small vessel disease, stable. CTA CHEST W CONTRAST   Final Result   1. Left perihilar and left lower lobe pneumonia. 2. Emphysematous changes with superimposed interstitial pulmonary fibrosis   3. Cardiomegaly with ectasia of the pulmonary trunk and pulmonary arteries   which can be seen with pulmonary hypertension   4. Contrast is seen within the right atrium and extends into the inferior   vena cava. These findings can be seen with right heart failure. 5. Satisfactory position of the endotracheal tube   6. Advanced degenerative changes of the spine. 7. There is no pulmonary embolus. CT ABDOMEN PELVIS W IV CONTRAST Additional Contrast? None   Final Result   Addendum (preliminary) 1 of 1   ADDENDUM:   There is a compression fracture of the T12 vertebral body this was present    on   the study of 05/17/2023. While this could be subacute nature, MRI would    be   of increased sensitivity in further characterization         Final   1. Increased bibasilar areas of consolidation and or atelectasis. 2. The cecum is markedly distended and stool-filled raising the possibility   of constipation. As this area appears isolated, a developing cecal volvulus   could give a similar appearance. Short-term follow-up is recommended after   medical therapy.    3. Multilevel degenerative disc disease   4. Compression fractures involving L5 and T12, relatively stable. 5. High position of the nasogastric tube and this may be advanced into the   body 5-6 cm.   6. Prominent umbilical hernia            XR ABDOMEN FOR NG/OG/NE TUBE PLACEMENT   Final Result   1. The tip of the NG tube appears to be 6 cm inferior to the gastroesophageal   junction. The side hole is seen at the level of the gastroesophageal   junction. The NG tube should be advanced by an additional 5-10 cm if   possible. XR CHEST PORTABLE   Final Result   1. Increased interstitial prominence within the lungs concerning for   developing pulmonary edema or fluid overload   2. Increased bibasilar linear opacities suggest worsening atelectasis. 3. The position and alignment of the tubes and catheters appear within the   normal range         XR CHEST PORTABLE   Final Result   Cardiomediastinal silhouette appears enlarged. Prominent hilar/perihilar structures probably reflecting prominent pulmonary   vessels however cannot entirely exclude nodule/mass. Would advise a   follow-up PA chest radiograph. Mild bibasilar atelectasis or infiltrate. No gross pneumothorax. Deformity of the right humeral head. XR CHEST PORTABLE    (Results Pending)       A/P:  Principal Problem:    Cardiac arrest ([address])  Active Problems:    Altered mental status    Pulmonary hypertension    Pneumonia due to infectious organism    Moderate protein-calorie malnutrition (HCC)    Shortness of breath    Palliative care encounter    Palliative care by specialist  Resolved Problems:    * No resolved hospital problems. *    Altered Mental Status - Resolved       Acute hypoxic respiratory failure  Status post cardiac arrest  Left lower lobe pneumonia  Severe pulmonary hypertension  Pulmonary sarcoidosis  History of moderate COPD, stage II by gold classification on 5 to 6 L at home  On 6 L HFNC, wean as tolerated.  On 5-6L at home   ProBNP

## 2023-09-05 LAB
ALBUMIN SERPL-MCNC: 3.2 G/DL (ref 3.5–5.2)
ALP SERPL-CCNC: 137 U/L (ref 35–104)
ALT SERPL-CCNC: 19 U/L (ref 0–32)
ANION GAP SERPL CALCULATED.3IONS-SCNC: 12 MMOL/L (ref 7–16)
AST SERPL-CCNC: 21 U/L (ref 0–31)
BASOPHILS # BLD: 0 K/UL (ref 0–0.2)
BASOPHILS NFR BLD: 0 % (ref 0–2)
BILIRUB SERPL-MCNC: 0.4 MG/DL (ref 0–1.2)
BUN SERPL-MCNC: 10 MG/DL (ref 6–23)
CALCIUM SERPL-MCNC: 8.5 MG/DL (ref 8.6–10.2)
CHLORIDE SERPL-SCNC: 109 MMOL/L (ref 98–107)
CO2 SERPL-SCNC: 24 MMOL/L (ref 22–29)
CREAT SERPL-MCNC: 0.9 MG/DL (ref 0.5–1)
EOSINOPHIL # BLD: 0.76 K/UL (ref 0.05–0.5)
EOSINOPHILS RELATIVE PERCENT: 13 % (ref 0–6)
ERYTHROCYTE [DISTWIDTH] IN BLOOD BY AUTOMATED COUNT: 18.4 % (ref 11.5–15)
GFR SERPL CREATININE-BSD FRML MDRD: >60 ML/MIN/1.73M2
GLUCOSE SERPL-MCNC: 71 MG/DL (ref 74–99)
HCT VFR BLD AUTO: 29.5 % (ref 34–48)
HGB BLD-MCNC: 9.2 G/DL (ref 11.5–15.5)
LYMPHOCYTES NFR BLD: 0.51 K/UL (ref 1.5–4)
LYMPHOCYTES RELATIVE PERCENT: 9 % (ref 20–42)
MCH RBC QN AUTO: 26 PG (ref 26–35)
MCHC RBC AUTO-ENTMCNC: 31.2 G/DL (ref 32–34.5)
MCV RBC AUTO: 83.3 FL (ref 80–99.9)
MONOCYTES NFR BLD: 0.15 K/UL (ref 0.1–0.95)
MONOCYTES NFR BLD: 3 % (ref 2–12)
NEUTROPHILS NFR BLD: 75 % (ref 43–80)
NEUTS SEG NFR BLD: 4.38 K/UL (ref 1.8–7.3)
PLATELET # BLD AUTO: 150 K/UL (ref 130–450)
PMV BLD AUTO: 11.2 FL (ref 7–12)
POTASSIUM SERPL-SCNC: 3.9 MMOL/L (ref 3.5–5)
PROT SERPL-MCNC: 5.7 G/DL (ref 6.4–8.3)
RBC # BLD AUTO: 3.54 M/UL (ref 3.5–5.5)
RBC # BLD: ABNORMAL 10*6/UL
SODIUM SERPL-SCNC: 145 MMOL/L (ref 132–146)
WBC OTHER # BLD: 5.8 K/UL (ref 4.5–11.5)

## 2023-09-05 PROCEDURE — 6360000002 HC RX W HCPCS

## 2023-09-05 PROCEDURE — 97161 PT EVAL LOW COMPLEX 20 MIN: CPT

## 2023-09-05 PROCEDURE — 97162 PT EVAL MOD COMPLEX 30 MIN: CPT

## 2023-09-05 PROCEDURE — 6370000000 HC RX 637 (ALT 250 FOR IP)

## 2023-09-05 PROCEDURE — 2500000003 HC RX 250 WO HCPCS: Performed by: INTERNAL MEDICINE

## 2023-09-05 PROCEDURE — 80053 COMPREHEN METABOLIC PANEL: CPT

## 2023-09-05 PROCEDURE — A4216 STERILE WATER/SALINE, 10 ML: HCPCS

## 2023-09-05 PROCEDURE — 94640 AIRWAY INHALATION TREATMENT: CPT

## 2023-09-05 PROCEDURE — 6360000002 HC RX W HCPCS: Performed by: INTERNAL MEDICINE

## 2023-09-05 PROCEDURE — 2060000000 HC ICU INTERMEDIATE R&B

## 2023-09-05 PROCEDURE — 97535 SELF CARE MNGMENT TRAINING: CPT

## 2023-09-05 PROCEDURE — 97165 OT EVAL LOW COMPLEX 30 MIN: CPT

## 2023-09-05 PROCEDURE — 51702 INSERT TEMP BLADDER CATH: CPT

## 2023-09-05 PROCEDURE — 99232 SBSQ HOSP IP/OBS MODERATE 35: CPT | Performed by: FAMILY MEDICINE

## 2023-09-05 PROCEDURE — 94660 CPAP INITIATION&MGMT: CPT

## 2023-09-05 PROCEDURE — 2580000003 HC RX 258: Performed by: INTERNAL MEDICINE

## 2023-09-05 PROCEDURE — 2580000003 HC RX 258

## 2023-09-05 PROCEDURE — 97530 THERAPEUTIC ACTIVITIES: CPT

## 2023-09-05 PROCEDURE — 6370000000 HC RX 637 (ALT 250 FOR IP): Performed by: INTERNAL MEDICINE

## 2023-09-05 PROCEDURE — 6370000000 HC RX 637 (ALT 250 FOR IP): Performed by: STUDENT IN AN ORGANIZED HEALTH CARE EDUCATION/TRAINING PROGRAM

## 2023-09-05 PROCEDURE — C9113 INJ PANTOPRAZOLE SODIUM, VIA: HCPCS

## 2023-09-05 PROCEDURE — 2700000000 HC OXYGEN THERAPY PER DAY

## 2023-09-05 PROCEDURE — 85025 COMPLETE CBC W/AUTO DIFF WBC: CPT

## 2023-09-05 RX ORDER — KETOROLAC TROMETHAMINE 30 MG/ML
30 INJECTION, SOLUTION INTRAMUSCULAR; INTRAVENOUS ONCE
Status: COMPLETED | OUTPATIENT
Start: 2023-09-05 | End: 2023-09-05

## 2023-09-05 RX ORDER — HYDROXYZINE HYDROCHLORIDE 10 MG/1
10 TABLET, FILM COATED ORAL 3 TIMES DAILY PRN
Status: DISCONTINUED | OUTPATIENT
Start: 2023-09-05 | End: 2023-09-13 | Stop reason: HOSPADM

## 2023-09-05 RX ORDER — AMLODIPINE BESYLATE 5 MG/1
5 TABLET ORAL DAILY
Status: DISCONTINUED | OUTPATIENT
Start: 2023-09-05 | End: 2023-09-13 | Stop reason: HOSPADM

## 2023-09-05 RX ADMIN — Medication 10 ML: at 20:09

## 2023-09-05 RX ADMIN — KETOROLAC TROMETHAMINE 30 MG: 30 INJECTION, SOLUTION INTRAMUSCULAR; INTRAVENOUS at 13:37

## 2023-09-05 RX ADMIN — POLYETHYLENE GLYCOL 3350 17 GRAM ORAL POWDER PACKET 17 G: at 08:53

## 2023-09-05 RX ADMIN — HYDROXYZINE HYDROCHLORIDE 10 MG: 10 TABLET ORAL at 20:10

## 2023-09-05 RX ADMIN — LEVETIRACETAM 500 MG: 100 INJECTION INTRAVENOUS at 08:54

## 2023-09-05 RX ADMIN — HEPARIN 300 UNITS: 100 SYRINGE at 20:09

## 2023-09-05 RX ADMIN — BUDESONIDE INHALATION 500 MCG: 0.5 SUSPENSION RESPIRATORY (INHALATION) at 09:16

## 2023-09-05 RX ADMIN — PREDNISONE 5 MG: 5 TABLET ORAL at 09:04

## 2023-09-05 RX ADMIN — APIXABAN 5 MG: 5 TABLET, FILM COATED ORAL at 08:54

## 2023-09-05 RX ADMIN — AMLODIPINE BESYLATE 5 MG: 5 TABLET ORAL at 13:36

## 2023-09-05 RX ADMIN — LEVOTHYROXINE SODIUM 75 MCG: 0.07 TABLET ORAL at 06:25

## 2023-09-05 RX ADMIN — IPRATROPIUM BROMIDE AND ALBUTEROL SULFATE 1 DOSE: .5; 2.5 SOLUTION RESPIRATORY (INHALATION) at 12:15

## 2023-09-05 RX ADMIN — HYDRALAZINE HYDROCHLORIDE 10 MG: 20 INJECTION, SOLUTION INTRAMUSCULAR; INTRAVENOUS at 13:51

## 2023-09-05 RX ADMIN — Medication 10 ML: at 09:22

## 2023-09-05 RX ADMIN — ARFORMOTEROL TARTRATE 15 MCG: 15 SOLUTION RESPIRATORY (INHALATION) at 09:16

## 2023-09-05 RX ADMIN — IPRATROPIUM BROMIDE AND ALBUTEROL SULFATE 1 DOSE: .5; 2.5 SOLUTION RESPIRATORY (INHALATION) at 20:23

## 2023-09-05 RX ADMIN — HEPARIN 300 UNITS: 100 SYRINGE at 08:53

## 2023-09-05 RX ADMIN — HYDROXYZINE PAMOATE 25 MG: 25 CAPSULE ORAL at 06:40

## 2023-09-05 RX ADMIN — HYDRALAZINE HYDROCHLORIDE 25 MG: 25 TABLET, FILM COATED ORAL at 20:10

## 2023-09-05 RX ADMIN — METOPROLOL TARTRATE 2.5 MG: 5 INJECTION, SOLUTION INTRAVENOUS at 05:01

## 2023-09-05 RX ADMIN — STANDARDIZED SENNA CONCENTRATE 8.6 MG: 8.6 TABLET ORAL at 20:10

## 2023-09-05 RX ADMIN — APIXABAN 5 MG: 5 TABLET, FILM COATED ORAL at 20:10

## 2023-09-05 RX ADMIN — ARFORMOTEROL TARTRATE 15 MCG: 15 SOLUTION RESPIRATORY (INHALATION) at 20:23

## 2023-09-05 RX ADMIN — AMIODARONE HYDROCHLORIDE 200 MG: 200 TABLET ORAL at 08:53

## 2023-09-05 RX ADMIN — METOPROLOL TARTRATE 25 MG: 25 TABLET, FILM COATED ORAL at 20:10

## 2023-09-05 RX ADMIN — IPRATROPIUM BROMIDE AND ALBUTEROL SULFATE 1 DOSE: .5; 2.5 SOLUTION RESPIRATORY (INHALATION) at 09:16

## 2023-09-05 RX ADMIN — POTASSIUM CHLORIDE: 2 INJECTION, SOLUTION, CONCENTRATE INTRAVENOUS at 16:40

## 2023-09-05 RX ADMIN — LEVETIRACETAM 500 MG: 100 INJECTION INTRAVENOUS at 20:10

## 2023-09-05 RX ADMIN — HYDRALAZINE HYDROCHLORIDE 25 MG: 25 TABLET, FILM COATED ORAL at 08:55

## 2023-09-05 RX ADMIN — IPRATROPIUM BROMIDE AND ALBUTEROL SULFATE 1 DOSE: .5; 2.5 SOLUTION RESPIRATORY (INHALATION) at 16:41

## 2023-09-05 RX ADMIN — BUDESONIDE INHALATION 500 MCG: 0.5 SUSPENSION RESPIRATORY (INHALATION) at 20:23

## 2023-09-05 RX ADMIN — SODIUM CHLORIDE, PRESERVATIVE FREE 40 MG: 5 INJECTION INTRAVENOUS at 08:54

## 2023-09-05 ASSESSMENT — PAIN DESCRIPTION - LOCATION: LOCATION: CHEST

## 2023-09-05 ASSESSMENT — PAIN SCALES - WONG BAKER: WONGBAKER_NUMERICALRESPONSE: 0

## 2023-09-05 ASSESSMENT — PAIN SCALES - GENERAL
PAINLEVEL_OUTOF10: 0
PAINLEVEL_OUTOF10: 8
PAINLEVEL_OUTOF10: 0

## 2023-09-05 NOTE — PROGRESS NOTES
[facility]   [address], [place], [place]      Date:2023                                                  Patient Name: Clay Costa  MRN: [id number]  : 1955  Room: [address] [place], [place] [address]    Evaluating OT: JAELYN Díaz, OTR/L  # [id number]    Referring Provider:  Meagan Gunn MD  Specific Provider Orders:  [facility]\"  9-3-23    Diagnosis: Cardiac arrest ([address]) [I46.9]  Shortness of breath [R06.02]  Pneumonia due to infectious organism, unspecified laterality, unspecified part of lung [J18.9]    Pt brought to ER w/ SOB, Intubated in ER, Required 2 rounds of CPR, admitted to ICU. Extubated 23. Transferred to Intermediate unit. Required continuous BiPAP for an extended time.  Compression Fractures found on imaging chronic per NS    Pertinent Medical History:  Pt has a past medical history of A-fib ([address]), Abnormal mammogram, Acute on chronic respiratory failure (HCC), Anemia, Anemia due to chronic illness, Ankle fracture, left, 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[address]), Backache, Benign hypertension, CHF (congestive heart failure) (HCC), Chronic back pain, Chronic kidney disease, Chronic pain disorder, Chronic, continuous use of opioids, Compression fracture of thoracic vertebra (HCC), COPD (chronic obstructive pulmonary disease) ([address]), Debility, Deformity of ankle and foot, acquired, Depression, Diabetes insipidus ([address]), Diverticulitis, Dry eyes, Encephalopathy acute, Gait disturbance, GERD (gastroesophageal reflux disease), Hemorrhoids, Hernia, History of blood transfusion, History of Clostridium difficile, Hyperlipidemia, Hypothyroidism, Ischemic colitis, enteritis, or enterocolitis ([address]), Long term (current) use of systemic steroids, Long-term current use of steroids, Lumbar disc herniation, Myalgia and myositis, unspecified, Nephrosclerosis, Nonunion of fracture, Osteoarthritis, PAF and fall prevention  * Patient/Family education to increase follow through with safety techniques and functional independence  * Recommendation of environmental modifications for increased safety with functional transfers/mobility and ADLs  * Cognitive retraining/development of therapeutic activities to improve problem solving, judgement, memory, and attention for increased safety/participation in ADL/IADL tasks  * Therapeutic exercise to improve motor endurance, ROM, and functional strength for ADLs/functional transfers  * Therapeutic activities to facilitate/challenge dynamic balance, stand tolerance for increased safety and independence with ADLs  * Therapeutic activities to facilitate gross/fine motor skills for increased independence with ADLs  * Neuro-muscular re-education: facilitation of righting/equilibrium reactions  * Positioning to improve skin integrity, interaction with environment and functional independence  * Delirium prevention/treatment  * Manual techniques for edema management  Other:    Recommended Adaptive Equipment: TBD as pt progresses     Home Living:  Pt lives with her  in a 1-story house w/ Ramp entry. Bed/bath on the main floor. (?) Basement. Bathroom setup:  Tub-Shower, Standard-height Commode   Equipment owned:  W/C, [employer], [facility], Shower Chair, Home O2 6L/BiPAP     Available Family Assist:   not able to provide 24/7 assist per pt     Prior Level of Function:  Pt reported being IND w/ all ADLs, Transfers and Mobility using [employer] for household ambulation. W/C for outings/appointments.         Has been hospitalized or in a SNF much of this calendar year  Driving:  Not reported  Occupation:  Not reported      Pain Level:  denied pain at rest, c/o chest discomfort in supine position, improved w/ HOB elevated to ~ 30*, RN Present and aware   Additional Complaints:  General fatigue, weakness    Cognition: A & O x 2 - cues for day/date   Able to Follow Multi-Step Commands w/ occasional

## 2023-09-05 NOTE — CARE COORDINATION
Patient now on 6L. No longer has LTAC criteria. Spoke with patient, she is confused today. Message left with spouse to discuss [facility] options. Will follow up when he calls back. [id number] Spouse called back wants 1. [id number] [facility], 2. [facility], referral pending to both facilities. Therapy seeing now. Told [facility] liaison patient ready to start precert if they can accept. For questions I can be reached at [phone].  [place], [place]

## 2023-09-05 NOTE — PROGRESS NOTES
Date: 9/4/2023    Time: 11:06 PM    Patient Placed On BIPAP/CPAP/ Non-Invasive Ventilation? Yes    If no must comment. Facial area red/color change? No           If YES are Blister/Lesion present? No   If yes must notify nursing staff  BIPAP/CPAP skin barrier?   Yes    Skin barrier type:mepilexlite     Swati Bellashley, [facility]    09/04/23 2307   NIV Type   NIV Started/Stopped On   Equipment Type V60   Mode AVAPS   Mask Type Full face mask   Mask Size Small   Assessment   Pulse 77   Heart Rate Source Monitor   Respirations 22   SpO2 98 %   Level of Consciousness 0   Comfort Level Good   Using Accessory Muscles No   Mask Compliance Good   Skin Assessment Clean, dry, & intact   Skin Protection for O2 Device Yes   Orientation Middle   Location Nose   Intervention(s) Skin Barrier   Breath Sounds   Right Upper Lobe Clear   Right Middle Lobe Clear   Right Lower Lobe Clear   Left Upper Lobe Clear   Left Lower Lobe Clear   Settings/Measurements   PIP Observed 17 cm H20   CPAP/EPAP 8 cmH2O   IPAP Min 12 cmH2O   IPAP Max 20 cmH2O   Vt (Set, mL) 400 mL   Vt (Measured) 459 mL   Rate Ordered 16   Insp Rise Time (%) 3 %   FiO2  55 %   I Time/ I Time % 0.75 s   Minute Volume (L/min) 10.2 Liters   Mask Leak (lpm) 57 lpm   Patient's Home Machine No   Alarm Settings   Alarms On Y

## 2023-09-05 NOTE — PROGRESS NOTES
[facility] Inpatient   Resident Progress Note    S:  Hospital day: 17    Brief Synopsis: Prabha Feldman is a 78 y.o. female with a past medical history of pulmonary sarcoidosis, atrial fibrillation on Eliquis, pulmonary hypertension, diabetes insipidus, hypothyroidism, hypertension, chronic kidney disease stage IIIa and combined systolic/diastolic heart failure who presents to the emergency department for shortness of breath. Patient was recently discharged from [facility] (08/18/23) due to acute hypoxic respiratory failure. Patient has had numerous hospital admissions for the same complaint. Hypotensive, tachycardic and hypoxic at presentation. She did decompensate and ended up in PEA. 2 rounds of CPR were performed and ROSC achieved. Patient admitted to the intensive care unit for acute hypoxic respiratory failure requiring intubation and vasopressors. She was extubated and on Bipap 08/29. She was denied LTAC at [facility] due to subactue compression fracture seen on 08/19 on CT and insufficient nutritional status as she was NPO due to BiPaP. She remained on BiPap and was slowly weaned to [facility] which she tolerated well. She was reevaluated with a swallow eval and able to be on puree diet. Neurosurgery consulted for the compression fracture which was stable and consulted PT/OT. Overnight/Interim:   No acute events overnight. She has been weaned down to 6 L HFNC. Able to eat some food with assistance. Denies: Fevers, chills, nausea, vomiting, abdominal pain, chest pain or SOB.  Patient still complains of chest pain       Cont meds:    IV infusion builder 100 mL/hr at 09/04/23 2051    sodium chloride      sodium chloride Stopped (08/29/23 1449)    dextrose       Scheduled meds:    amLODIPine  5 mg Oral Daily    apixaban  5 mg Oral BID    ipratropium 0.5 mg-albuterol 2.5 mg  1 Dose Inhalation Q4H WA RT    lidocaine  1 patch TransDERmal Daily    metoprolol tartrate  25 mg Oral BID    predniSONE  5 suggested         XR CHEST PORTABLE   Final Result   Progressing pulmonary edema. Interval clearing of left retrocardiac opacity. XR CHEST PORTABLE   Final Result   Central pulmonary artery hypertension. Left retrocardiac atelectasis or pneumonic consolidation. XR ABDOMEN FOR NG/OG/NE TUBE PLACEMENT   Final Result   Nasogastric tube identified tip in the stomach. Bowel gas pattern is   nonspecific. XR ABDOMEN FOR NG/OG/NE TUBE PLACEMENT   Final Result   Satisfactory position of NG tube. CT HEAD WO CONTRAST   Final Result   1. No acute intracranial abnormality. 2.  Signs of deep white matter small vessel disease, stable. CTA CHEST W CONTRAST   Final Result   1. Left perihilar and left lower lobe pneumonia. 2. Emphysematous changes with superimposed interstitial pulmonary fibrosis   3. Cardiomegaly with ectasia of the pulmonary trunk and pulmonary arteries   which can be seen with pulmonary hypertension   4. Contrast is seen within the right atrium and extends into the inferior   vena cava. These findings can be seen with right heart failure. 5. Satisfactory position of the endotracheal tube   6. Advanced degenerative changes of the spine. 7. There is no pulmonary embolus. CT ABDOMEN PELVIS W IV CONTRAST Additional Contrast? None   Final Result   Addendum (preliminary) 1 of 1   ADDENDUM:   There is a compression fracture of the T12 vertebral body this was present    on   the study of 05/17/2023. While this could be subacute nature, MRI would    be   of increased sensitivity in further characterization         Final   1. Increased bibasilar areas of consolidation and or atelectasis. 2. The cecum is markedly distended and stool-filled raising the possibility   of constipation. As this area appears isolated, a developing cecal volvulus   could give a similar appearance. Short-term follow-up is recommended after   medical therapy.    3. Multilevel

## 2023-09-05 NOTE — PROGRESS NOTES
Physical Therapy Initial Assessment     Name: Jaki Cole  : 1955  MRN: [id number]      Date of Service: 2023    Evaluating PT:  John Rao PT, DPT [id number]    Room #:  3865/4160-G  Diagnosis:  Cardiac arrest ([address]) [I46.9]  Shortness of breath [R06.02]  Pneumonia due to infectious organism, unspecified laterality, unspecified part of lung [J18.9]  PMHx/PSHx:    Past Medical History:   Diagnosis Date    A-fib [facility])     follows with Dr Malik Frederick    Abnormal mammogram     Acute on chronic respiratory failure ([address]) 2017    Anemia     Anemia due to chronic illness     Ankle fracture, left 2008    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    Backache     Benign hypertension     CHF (congestive heart failure) ([facility])     Chronic back pain     Chronic kidney disease     stage 3    Chronic pain disorder     Chronic, continuous use of opioids     Compression fracture of thoracic vertebra (HCC)     T10    COPD (chronic obstructive pulmonary disease) ([address])     Debility     Deformity of ankle and foot, acquired 2011    Depression     Diabetes insipidus ([address])     Diverticulitis     Dry eyes     Encephalopathy acute 2017    Gait disturbance     GERD (gastroesophageal reflux disease)     Hemorrhoids 2012    Hernia     History of blood transfusion approx 2017    History of Clostridium difficile     negative culture 12-15-15; resolved    Hyperlipidemia     Hypothyroidism     Ischemic colitis, enteritis, or enterocolitis ([address])     Long term (current) use of systemic steroids 07/10/2022    Long-term current use of steroids     Lumbar disc herniation     Myalgia and myositis, unspecified     Nephrosclerosis     Nonunion of fracture 2011    Osteoarthritis     severe    PAF (paroxysmal atrial fibrillation) ([facility])     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HCC)     PCWP mean:26.0 PA mean: 24.00; denies any recent issues    Pulmonary hypertension (HCC)     ventricular diastolic called for help, but could not state what she needed help with. Sensation:  Pt denies numbness and tingling to extremities  Edema:  No significant edema present    Vitals:  Blood Pressure at rest - Blood Pressure post session -   Heart Rate at rest 90 bpm Heart Rate post session 100 bpm   SPO2 at rest 86 6L SPO2 post session 95%     Therapeutic Exercises:  none performed this visit    Patient education  Pt educated on role of PT, importance of mobility    Patient response to education:   Pt verbalized understanding Pt demonstrated skill Pt requires further education in this area   x x PRN     ASSESSMENT:    Conditions Requiring Skilled Therapeutic Intervention:    [x]Decreased strength     []Decreased ROM  [x]Decreased functional mobility  [x]Decreased balance   [x]Decreased endurance   [x]Decreased posture  []Decreased sensation  []Decreased coordination   []Decreased vision  [x]Decreased safety awareness   []Increased pain       Comments:  Pt was supine in bed upon PT arrival and was agreeable to therapy. Vitals were taken after she was found to not have NC properly in place and was instructed to breath through nose until Sp02 was WNL, improved form 86% to 93% in 1 min. Pt was then assisted to EOB by PT where she was able to sit with assistance for 8 minutes where she had increased complaints of chest pain and requested to be return to bed. Pt declined attempting to stand d/t chest pain. Vitals were attempted but not obtained due to equipment error. Pt was assisted back to bed where she was left with the call light in reach and all needs met. Treatment:  Patient practiced and was instructed in the following treatment:    Bed mobility: performed with cues for safety awareness and proper hand placement to promote improved functional independence. Pt's/ family goals   1. Return home safely. Prognosis is fair for reaching above PT goals.     Patient and or family understand(s) diagnosis, prognosis, and plan

## 2023-09-05 NOTE — PROGRESS NOTES
Family at bedside, encouraging patient to eat, brought mashed potatoes. Patient continues poor po, continues 6L high flow. Patient prefers to lay on left side, prepositioned to right side, educated on importance of frequent repositioning. Bed alarm on. Call light within reach.

## 2023-09-06 ENCOUNTER — APPOINTMENT (OUTPATIENT)
Dept: CT IMAGING | Age: 68
DRG: 870 | End: 2023-09-06
Payer: MEDICARE

## 2023-09-06 ENCOUNTER — APPOINTMENT (OUTPATIENT)
Dept: GENERAL RADIOLOGY | Age: 68
DRG: 870 | End: 2023-09-06
Payer: MEDICARE

## 2023-09-06 LAB
ALBUMIN SERPL-MCNC: 3.1 G/DL (ref 3.5–5.2)
ALP SERPL-CCNC: 135 U/L (ref 35–104)
ALT SERPL-CCNC: 16 U/L (ref 0–32)
ANION GAP SERPL CALCULATED.3IONS-SCNC: 10 MMOL/L (ref 7–16)
AST SERPL-CCNC: 18 U/L (ref 0–31)
B.E.: 1.8 MMOL/L (ref -3–3)
BASOPHILS # BLD: 0 K/UL (ref 0–0.2)
BASOPHILS NFR BLD: 0 % (ref 0–2)
BILIRUB SERPL-MCNC: 0.5 MG/DL (ref 0–1.2)
BNP SERPL-MCNC: 9191 PG/ML (ref 0–125)
BUN SERPL-MCNC: 9 MG/DL (ref 6–23)
CALCIUM SERPL-MCNC: 8.3 MG/DL (ref 8.6–10.2)
CHLORIDE SERPL-SCNC: 107 MMOL/L (ref 98–107)
CO2 SERPL-SCNC: 24 MMOL/L (ref 22–29)
COHB: 1.1 % (ref 0–1.5)
CREAT SERPL-MCNC: 0.9 MG/DL (ref 0.5–1)
CRITICAL: ABNORMAL
DATE ANALYZED: ABNORMAL
DATE OF COLLECTION: ABNORMAL
EOSINOPHIL # BLD: 0.2 K/UL (ref 0.05–0.5)
EOSINOPHILS RELATIVE PERCENT: 4 % (ref 0–6)
ERYTHROCYTE [DISTWIDTH] IN BLOOD BY AUTOMATED COUNT: 18.5 % (ref 11.5–15)
GFR SERPL CREATININE-BSD FRML MDRD: >60 ML/MIN/1.73M2
GLUCOSE BLD-MCNC: 86 MG/DL (ref 74–99)
GLUCOSE SERPL-MCNC: 68 MG/DL (ref 74–99)
HCO3: 26.4 MMOL/L (ref 22–26)
HCT VFR BLD AUTO: 29.3 % (ref 34–48)
HGB BLD-MCNC: 8.8 G/DL (ref 11.5–15.5)
HHB: 9.8 % (ref 0–5)
LAB: ABNORMAL
LYMPHOCYTES NFR BLD: 0.2 K/UL (ref 1.5–4)
LYMPHOCYTES RELATIVE PERCENT: 4 % (ref 20–42)
Lab: ABNORMAL
MCH RBC QN AUTO: 26 PG (ref 26–35)
MCHC RBC AUTO-ENTMCNC: 30 G/DL (ref 32–34.5)
MCV RBC AUTO: 86.4 FL (ref 80–99.9)
METHB: 0.4 % (ref 0–1.5)
MODE: ABNORMAL
MONOCYTES NFR BLD: 0.33 K/UL (ref 0.1–0.95)
MONOCYTES NFR BLD: 7 % (ref 2–12)
MYELOCYTES ABSOLUTE COUNT: 0.04 K/UL
MYELOCYTES: 1 %
NEUTROPHILS NFR BLD: 84 % (ref 43–80)
NEUTS SEG NFR BLD: 3.92 K/UL (ref 1.8–7.3)
O2 CONTENT: 13 ML/DL
O2 SATURATION: 90.1 % (ref 92–98.5)
O2HB: 88.7 % (ref 94–97)
OPERATOR ID: 8214
PATIENT TEMP: 37 C
PCO2: 41.5 MMHG (ref 35–45)
PH BLOOD GAS: 7.42 (ref 7.35–7.45)
PLATELET # BLD AUTO: 143 K/UL (ref 130–450)
PMV BLD AUTO: 11.7 FL (ref 7–12)
PO2: 58 MMHG (ref 75–100)
POTASSIUM SERPL-SCNC: 4.5 MMOL/L (ref 3.5–5)
PROT SERPL-MCNC: 5.7 G/DL (ref 6.4–8.3)
RBC # BLD AUTO: 3.39 M/UL (ref 3.5–5.5)
RBC # BLD: ABNORMAL 10*6/UL
REASON FOR REJECTION: NORMAL
SODIUM SERPL-SCNC: 141 MMOL/L (ref 132–146)
SOURCE, BLOOD GAS: ABNORMAL
SPECIMEN SOURCE: NORMAL
THB: 10.4 G/DL (ref 11.5–16.5)
TIME ANALYZED: 1040
WBC OTHER # BLD: 4.7 K/UL (ref 4.5–11.5)
ZZ NTE CLEAN UP: ORDERED TEST: NORMAL

## 2023-09-06 PROCEDURE — 6360000002 HC RX W HCPCS

## 2023-09-06 PROCEDURE — 36415 COLL VENOUS BLD VENIPUNCTURE: CPT

## 2023-09-06 PROCEDURE — 82805 BLOOD GASES W/O2 SATURATION: CPT

## 2023-09-06 PROCEDURE — 6370000000 HC RX 637 (ALT 250 FOR IP): Performed by: INTERNAL MEDICINE

## 2023-09-06 PROCEDURE — 6370000000 HC RX 637 (ALT 250 FOR IP)

## 2023-09-06 PROCEDURE — 70450 CT HEAD/BRAIN W/O DYE: CPT

## 2023-09-06 PROCEDURE — 94640 AIRWAY INHALATION TREATMENT: CPT

## 2023-09-06 PROCEDURE — 80053 COMPREHEN METABOLIC PANEL: CPT

## 2023-09-06 PROCEDURE — 71045 X-RAY EXAM CHEST 1 VIEW: CPT

## 2023-09-06 PROCEDURE — 92526 ORAL FUNCTION THERAPY: CPT

## 2023-09-06 PROCEDURE — 83880 ASSAY OF NATRIURETIC PEPTIDE: CPT

## 2023-09-06 PROCEDURE — 2060000000 HC ICU INTERMEDIATE R&B

## 2023-09-06 PROCEDURE — A4216 STERILE WATER/SALINE, 10 ML: HCPCS

## 2023-09-06 PROCEDURE — 99232 SBSQ HOSP IP/OBS MODERATE 35: CPT | Performed by: FAMILY MEDICINE

## 2023-09-06 PROCEDURE — 2580000003 HC RX 258

## 2023-09-06 PROCEDURE — 85025 COMPLETE CBC W/AUTO DIFF WBC: CPT

## 2023-09-06 PROCEDURE — 94660 CPAP INITIATION&MGMT: CPT

## 2023-09-06 PROCEDURE — 2700000000 HC OXYGEN THERAPY PER DAY

## 2023-09-06 PROCEDURE — 87040 BLOOD CULTURE FOR BACTERIA: CPT

## 2023-09-06 PROCEDURE — 87077 CULTURE AEROBIC IDENTIFY: CPT

## 2023-09-06 PROCEDURE — 87086 URINE CULTURE/COLONY COUNT: CPT

## 2023-09-06 PROCEDURE — 82962 GLUCOSE BLOOD TEST: CPT

## 2023-09-06 PROCEDURE — C9113 INJ PANTOPRAZOLE SODIUM, VIA: HCPCS

## 2023-09-06 PROCEDURE — 6360000002 HC RX W HCPCS: Performed by: INTERNAL MEDICINE

## 2023-09-06 RX ADMIN — IPRATROPIUM BROMIDE AND ALBUTEROL SULFATE 1 DOSE: .5; 2.5 SOLUTION RESPIRATORY (INHALATION) at 11:36

## 2023-09-06 RX ADMIN — ARFORMOTEROL TARTRATE 15 MCG: 15 SOLUTION RESPIRATORY (INHALATION) at 19:12

## 2023-09-06 RX ADMIN — LEVETIRACETAM 500 MG: 100 INJECTION INTRAVENOUS at 11:54

## 2023-09-06 RX ADMIN — ALTEPLASE 1 MG: 2.2 INJECTION, POWDER, LYOPHILIZED, FOR SOLUTION INTRAVENOUS at 05:39

## 2023-09-06 RX ADMIN — ARFORMOTEROL TARTRATE 15 MCG: 15 SOLUTION RESPIRATORY (INHALATION) at 08:38

## 2023-09-06 RX ADMIN — BUDESONIDE INHALATION 500 MCG: 0.5 SUSPENSION RESPIRATORY (INHALATION) at 08:38

## 2023-09-06 RX ADMIN — HYDRALAZINE HYDROCHLORIDE 25 MG: 25 TABLET, FILM COATED ORAL at 11:54

## 2023-09-06 RX ADMIN — APIXABAN 5 MG: 5 TABLET, FILM COATED ORAL at 21:57

## 2023-09-06 RX ADMIN — HEPARIN 300 UNITS: 100 SYRINGE at 11:55

## 2023-09-06 RX ADMIN — HYDROXYZINE HYDROCHLORIDE 10 MG: 10 TABLET ORAL at 14:14

## 2023-09-06 RX ADMIN — AMIODARONE HYDROCHLORIDE 200 MG: 200 TABLET ORAL at 11:55

## 2023-09-06 RX ADMIN — SODIUM CHLORIDE, PRESERVATIVE FREE 40 MG: 5 INJECTION INTRAVENOUS at 11:53

## 2023-09-06 RX ADMIN — APIXABAN 5 MG: 5 TABLET, FILM COATED ORAL at 11:54

## 2023-09-06 RX ADMIN — AMLODIPINE BESYLATE 5 MG: 5 TABLET ORAL at 11:54

## 2023-09-06 RX ADMIN — ACETAMINOPHEN 650 MG: 325 TABLET ORAL at 13:50

## 2023-09-06 RX ADMIN — HEPARIN 300 UNITS: 100 SYRINGE at 21:15

## 2023-09-06 RX ADMIN — IPRATROPIUM BROMIDE AND ALBUTEROL SULFATE 1 DOSE: .5; 2.5 SOLUTION RESPIRATORY (INHALATION) at 19:11

## 2023-09-06 RX ADMIN — Medication 10 ML: at 21:15

## 2023-09-06 RX ADMIN — Medication 10 ML: at 11:57

## 2023-09-06 RX ADMIN — POTASSIUM CHLORIDE: 2 INJECTION, SOLUTION, CONCENTRATE INTRAVENOUS at 16:15

## 2023-09-06 RX ADMIN — IPRATROPIUM BROMIDE AND ALBUTEROL SULFATE 1 DOSE: .5; 2.5 SOLUTION RESPIRATORY (INHALATION) at 16:11

## 2023-09-06 RX ADMIN — PREDNISONE 5 MG: 5 TABLET ORAL at 11:57

## 2023-09-06 RX ADMIN — Medication 10 ML: at 21:57

## 2023-09-06 RX ADMIN — IPRATROPIUM BROMIDE AND ALBUTEROL SULFATE 1 DOSE: .5; 2.5 SOLUTION RESPIRATORY (INHALATION) at 08:38

## 2023-09-06 RX ADMIN — HYDROXYZINE HYDROCHLORIDE 10 MG: 10 TABLET ORAL at 22:52

## 2023-09-06 RX ADMIN — STANDARDIZED SENNA CONCENTRATE 8.6 MG: 8.6 TABLET ORAL at 21:56

## 2023-09-06 RX ADMIN — LEVETIRACETAM 500 MG: 100 INJECTION INTRAVENOUS at 21:15

## 2023-09-06 RX ADMIN — BUDESONIDE INHALATION 500 MCG: 0.5 SUSPENSION RESPIRATORY (INHALATION) at 19:12

## 2023-09-06 RX ADMIN — METOPROLOL TARTRATE 25 MG: 25 TABLET, FILM COATED ORAL at 11:54

## 2023-09-06 RX ADMIN — ACETAMINOPHEN 650 MG: 325 TABLET ORAL at 22:52

## 2023-09-06 ASSESSMENT — PAIN DESCRIPTION - LOCATION: LOCATION: BACK;CHEST;ABDOMEN

## 2023-09-06 ASSESSMENT — PAIN DESCRIPTION - DESCRIPTORS: DESCRIPTORS: ACHING;DISCOMFORT

## 2023-09-06 NOTE — CARE COORDINATION
[facility] no beds, referral pending to [facility], spoke with facility and stated if they can accept patient is stable for discharge once auth obtained. [phone] Patient accepted at [facility], spoke with KAREEN Betancourt, she is submitting for precert now. 1213 Auth obtained, message sent to family medicine requesting discharge orders. Pasrr and ambulance on soft chart. 1401 Message sent to pul to check if stable from their perspective for discharge. For questions I can be reached at [phone].  Analia [place], [place]

## 2023-09-06 NOTE — PROGRESS NOTES
Date: 9/6/2023    Time: 7:28 PM    Patient Placed On BIPAP/CPAP/ Non-Invasive Ventilation? Yes    If no must comment. Facial area red/color change? No           If YES are Blister/Lesion present? No   If yes must notify nursing staff  BIPAP/CPAP skin barrier?   Yes    Skin barrier type:mepilexlite       Comments:        Elo Mason RCP

## 2023-09-06 NOTE — PROGRESS NOTES
FM messaged regarding possible PICC removal. Electronically signed by Kori Carter RN on 9/6/2023 at 3:26 PM

## 2023-09-06 NOTE — PROGRESS NOTES
Date: 9/5/2023    Time: 8:42 PM    Patient Placed On BIPAP/CPAP/ Non-Invasive Ventilation? Yes    If no must comment. Facial area red/color change? No           If YES are Blister/Lesion present? No   If yes must notify nursing staff  BIPAP/CPAP skin barrier? Removed nasal pad, patient pulled at mask, nasal pad came off by the mouth. For patient safety, nasal pad not on at this time. Skin is not red or broken down.      Skin barrier type: read above        Comments:        Mily Becerril RCP

## 2023-09-06 NOTE — PROGRESS NOTES
Order for patient to be placed on the BIPAP 2 hours on/ 4 hours off and at HS. Patient placed on the BIPAP. SPO2 95%.

## 2023-09-07 ENCOUNTER — APPOINTMENT (OUTPATIENT)
Dept: GENERAL RADIOLOGY | Age: 68
DRG: 870 | End: 2023-09-07
Payer: MEDICARE

## 2023-09-07 LAB
ALBUMIN SERPL-MCNC: 3 G/DL (ref 3.5–5.2)
ALBUMIN SERPL-MCNC: 3.2 G/DL (ref 3.5–5.2)
ALP SERPL-CCNC: 139 U/L (ref 35–104)
ALP SERPL-CCNC: 147 U/L (ref 35–104)
ALT SERPL-CCNC: 15 U/L (ref 0–32)
ALT SERPL-CCNC: 15 U/L (ref 0–32)
ANION GAP SERPL CALCULATED.3IONS-SCNC: 7 MMOL/L (ref 7–16)
ANION GAP SERPL CALCULATED.3IONS-SCNC: 7 MMOL/L (ref 7–16)
ANION GAP SERPL CALCULATED.3IONS-SCNC: 9 MMOL/L (ref 7–16)
AST SERPL-CCNC: 18 U/L (ref 0–31)
AST SERPL-CCNC: 20 U/L (ref 0–31)
B.E.: 1.4 MMOL/L (ref -3–3)
BASOPHILS # BLD: 0.02 K/UL (ref 0–0.2)
BASOPHILS # BLD: 0.02 K/UL (ref 0–0.2)
BASOPHILS NFR BLD: 0 % (ref 0–2)
BASOPHILS NFR BLD: 0 % (ref 0–2)
BILIRUB SERPL-MCNC: 0.4 MG/DL (ref 0–1.2)
BILIRUB SERPL-MCNC: 0.5 MG/DL (ref 0–1.2)
BUN SERPL-MCNC: 10 MG/DL (ref 6–23)
BUN SERPL-MCNC: 10 MG/DL (ref 6–23)
BUN SERPL-MCNC: 12 MG/DL (ref 6–23)
CALCIUM SERPL-MCNC: 8.5 MG/DL (ref 8.6–10.2)
CALCIUM SERPL-MCNC: 8.6 MG/DL (ref 8.6–10.2)
CALCIUM SERPL-MCNC: 8.7 MG/DL (ref 8.6–10.2)
CHLORIDE SERPL-SCNC: 108 MMOL/L (ref 98–107)
CHLORIDE SERPL-SCNC: 108 MMOL/L (ref 98–107)
CHLORIDE SERPL-SCNC: 109 MMOL/L (ref 98–107)
CO2 SERPL-SCNC: 23 MMOL/L (ref 22–29)
CO2 SERPL-SCNC: 27 MMOL/L (ref 22–29)
CO2 SERPL-SCNC: 27 MMOL/L (ref 22–29)
COHB: 1.1 % (ref 0–1.5)
CREAT SERPL-MCNC: 1.1 MG/DL (ref 0.5–1)
CREAT SERPL-MCNC: 1.1 MG/DL (ref 0.5–1)
CREAT SERPL-MCNC: 1.2 MG/DL (ref 0.5–1)
CRITICAL: ABNORMAL
DATE ANALYZED: ABNORMAL
DATE OF COLLECTION: ABNORMAL
EOSINOPHIL # BLD: 0.19 K/UL (ref 0.05–0.5)
EOSINOPHIL # BLD: 0.2 K/UL (ref 0.05–0.5)
EOSINOPHILS RELATIVE PERCENT: 4 % (ref 0–6)
EOSINOPHILS RELATIVE PERCENT: 4 % (ref 0–6)
ERYTHROCYTE [DISTWIDTH] IN BLOOD BY AUTOMATED COUNT: 18.2 % (ref 11.5–15)
ERYTHROCYTE [DISTWIDTH] IN BLOOD BY AUTOMATED COUNT: 18.6 % (ref 11.5–15)
GFR SERPL CREATININE-BSD FRML MDRD: 51 ML/MIN/1.73M2
GFR SERPL CREATININE-BSD FRML MDRD: 55 ML/MIN/1.73M2
GFR SERPL CREATININE-BSD FRML MDRD: 55 ML/MIN/1.73M2
GLUCOSE SERPL-MCNC: 66 MG/DL (ref 74–99)
GLUCOSE SERPL-MCNC: 68 MG/DL (ref 74–99)
GLUCOSE SERPL-MCNC: 95 MG/DL (ref 74–99)
HCO3: 26.7 MMOL/L (ref 22–26)
HCT VFR BLD AUTO: 26.7 % (ref 34–48)
HCT VFR BLD AUTO: 30.6 % (ref 34–48)
HGB BLD-MCNC: 8.3 G/DL (ref 11.5–15.5)
HGB BLD-MCNC: 9.2 G/DL (ref 11.5–15.5)
HHB: 14.3 % (ref 0–5)
IMM GRANULOCYTES # BLD AUTO: 0.03 K/UL (ref 0–0.58)
IMM GRANULOCYTES # BLD AUTO: <0.03 K/UL (ref 0–0.58)
IMM GRANULOCYTES NFR BLD: 0 % (ref 0–5)
IMM GRANULOCYTES NFR BLD: 1 % (ref 0–5)
LAB: ABNORMAL
LYMPHOCYTES NFR BLD: 0.55 K/UL (ref 1.5–4)
LYMPHOCYTES NFR BLD: 0.59 K/UL (ref 1.5–4)
LYMPHOCYTES RELATIVE PERCENT: 11 % (ref 20–42)
LYMPHOCYTES RELATIVE PERCENT: 12 % (ref 20–42)
Lab: ABNORMAL
MCH RBC QN AUTO: 25.8 PG (ref 26–35)
MCH RBC QN AUTO: 26.1 PG (ref 26–35)
MCHC RBC AUTO-ENTMCNC: 30.1 G/DL (ref 32–34.5)
MCHC RBC AUTO-ENTMCNC: 31.1 G/DL (ref 32–34.5)
MCV RBC AUTO: 82.9 FL (ref 80–99.9)
MCV RBC AUTO: 86.7 FL (ref 80–99.9)
METHB: 0.4 % (ref 0–1.5)
MICROORGANISM SPEC CULT: ABNORMAL
MODE: ABNORMAL
MONOCYTES NFR BLD: 0.52 K/UL (ref 0.1–0.95)
MONOCYTES NFR BLD: 0.6 K/UL (ref 0.1–0.95)
MONOCYTES NFR BLD: 10 % (ref 2–12)
MONOCYTES NFR BLD: 12 % (ref 2–12)
NEUTROPHILS NFR BLD: 72 % (ref 43–80)
NEUTROPHILS NFR BLD: 75 % (ref 43–80)
NEUTS SEG NFR BLD: 3.68 K/UL (ref 1.8–7.3)
NEUTS SEG NFR BLD: 3.85 K/UL (ref 1.8–7.3)
O2 CONTENT: 12.8 ML/DL
O2 SATURATION: 85.5 % (ref 92–98.5)
O2HB: 84.2 % (ref 94–97)
OPERATOR ID: 5100
PATIENT TEMP: 37 C
PCO2: 45 MMHG (ref 35–45)
PH BLOOD GAS: 7.39 (ref 7.35–7.45)
PLATELET # BLD AUTO: 142 K/UL (ref 130–450)
PLATELET, FLUORESCENCE: 135 K/UL (ref 130–450)
PMV BLD AUTO: 10.9 FL (ref 7–12)
PMV BLD AUTO: 12.1 FL (ref 7–12)
PO2: 52.5 MMHG (ref 75–100)
POTASSIUM SERPL-SCNC: 3.9 MMOL/L (ref 3.5–5)
POTASSIUM SERPL-SCNC: 4.4 MMOL/L (ref 3.5–5)
POTASSIUM SERPL-SCNC: 4.7 MMOL/L (ref 3.5–5)
PROT SERPL-MCNC: 5.5 G/DL (ref 6.4–8.3)
PROT SERPL-MCNC: 5.5 G/DL (ref 6.4–8.3)
RBC # BLD AUTO: 3.22 M/UL (ref 3.5–5.5)
RBC # BLD AUTO: 3.53 M/UL (ref 3.5–5.5)
RBC # BLD: ABNORMAL 10*6/UL
SODIUM SERPL-SCNC: 141 MMOL/L (ref 132–146)
SODIUM SERPL-SCNC: 142 MMOL/L (ref 132–146)
SODIUM SERPL-SCNC: 142 MMOL/L (ref 132–146)
SOURCE, BLOOD GAS: ABNORMAL
SPECIMEN DESCRIPTION: ABNORMAL
THB: 10.8 G/DL (ref 11.5–16.5)
TIME ANALYZED: 1102
WBC OTHER # BLD: 5.1 K/UL (ref 4.5–11.5)
WBC OTHER # BLD: 5.2 K/UL (ref 4.5–11.5)

## 2023-09-07 PROCEDURE — 2060000000 HC ICU INTERMEDIATE R&B

## 2023-09-07 PROCEDURE — 85025 COMPLETE CBC W/AUTO DIFF WBC: CPT

## 2023-09-07 PROCEDURE — 94640 AIRWAY INHALATION TREATMENT: CPT

## 2023-09-07 PROCEDURE — 6370000000 HC RX 637 (ALT 250 FOR IP): Performed by: INTERNAL MEDICINE

## 2023-09-07 PROCEDURE — 6360000002 HC RX W HCPCS

## 2023-09-07 PROCEDURE — 2580000003 HC RX 258

## 2023-09-07 PROCEDURE — 6370000000 HC RX 637 (ALT 250 FOR IP)

## 2023-09-07 PROCEDURE — 99232 SBSQ HOSP IP/OBS MODERATE 35: CPT | Performed by: FAMILY MEDICINE

## 2023-09-07 PROCEDURE — 71045 X-RAY EXAM CHEST 1 VIEW: CPT

## 2023-09-07 PROCEDURE — 36600 WITHDRAWAL OF ARTERIAL BLOOD: CPT

## 2023-09-07 PROCEDURE — 2700000000 HC OXYGEN THERAPY PER DAY

## 2023-09-07 PROCEDURE — 6360000002 HC RX W HCPCS: Performed by: INTERNAL MEDICINE

## 2023-09-07 PROCEDURE — 94660 CPAP INITIATION&MGMT: CPT

## 2023-09-07 PROCEDURE — 80048 BASIC METABOLIC PNL TOTAL CA: CPT

## 2023-09-07 PROCEDURE — 80053 COMPREHEN METABOLIC PANEL: CPT

## 2023-09-07 PROCEDURE — C9113 INJ PANTOPRAZOLE SODIUM, VIA: HCPCS

## 2023-09-07 PROCEDURE — 82805 BLOOD GASES W/O2 SATURATION: CPT

## 2023-09-07 PROCEDURE — 36415 COLL VENOUS BLD VENIPUNCTURE: CPT

## 2023-09-07 PROCEDURE — A4216 STERILE WATER/SALINE, 10 ML: HCPCS

## 2023-09-07 RX ORDER — FLUCONAZOLE 150 MG/1
150 TABLET ORAL
Status: DISCONTINUED | OUTPATIENT
Start: 2023-09-07 | End: 2023-09-08

## 2023-09-07 RX ORDER — OXYCODONE HYDROCHLORIDE 5 MG/1
2.5 TABLET ORAL EVERY 6 HOURS PRN
Status: DISCONTINUED | OUTPATIENT
Start: 2023-09-07 | End: 2023-09-13 | Stop reason: HOSPADM

## 2023-09-07 RX ADMIN — Medication 10 ML: at 10:07

## 2023-09-07 RX ADMIN — ARFORMOTEROL TARTRATE 15 MCG: 15 SOLUTION RESPIRATORY (INHALATION) at 08:29

## 2023-09-07 RX ADMIN — HYDROXYZINE HYDROCHLORIDE 10 MG: 10 TABLET ORAL at 10:05

## 2023-09-07 RX ADMIN — STANDARDIZED SENNA CONCENTRATE 8.6 MG: 8.6 TABLET ORAL at 19:41

## 2023-09-07 RX ADMIN — Medication 10 ML: at 19:42

## 2023-09-07 RX ADMIN — APIXABAN 5 MG: 5 TABLET, FILM COATED ORAL at 10:05

## 2023-09-07 RX ADMIN — LEVETIRACETAM 500 MG: 100 INJECTION INTRAVENOUS at 19:40

## 2023-09-07 RX ADMIN — AMIODARONE HYDROCHLORIDE 200 MG: 200 TABLET ORAL at 10:05

## 2023-09-07 RX ADMIN — PREDNISONE 5 MG: 5 TABLET ORAL at 10:07

## 2023-09-07 RX ADMIN — BUDESONIDE INHALATION 500 MCG: 0.5 SUSPENSION RESPIRATORY (INHALATION) at 19:19

## 2023-09-07 RX ADMIN — LEVETIRACETAM 500 MG: 100 INJECTION INTRAVENOUS at 10:05

## 2023-09-07 RX ADMIN — IPRATROPIUM BROMIDE AND ALBUTEROL SULFATE 1 DOSE: .5; 2.5 SOLUTION RESPIRATORY (INHALATION) at 11:50

## 2023-09-07 RX ADMIN — OXYCODONE HYDROCHLORIDE 2.5 MG: 5 TABLET ORAL at 10:52

## 2023-09-07 RX ADMIN — HEPARIN 300 UNITS: 100 SYRINGE at 10:06

## 2023-09-07 RX ADMIN — METOPROLOL TARTRATE 25 MG: 25 TABLET, FILM COATED ORAL at 19:41

## 2023-09-07 RX ADMIN — HEPARIN 300 UNITS: 100 SYRINGE at 19:41

## 2023-09-07 RX ADMIN — IPRATROPIUM BROMIDE AND ALBUTEROL SULFATE 1 DOSE: .5; 2.5 SOLUTION RESPIRATORY (INHALATION) at 08:29

## 2023-09-07 RX ADMIN — METOPROLOL TARTRATE 25 MG: 25 TABLET, FILM COATED ORAL at 10:05

## 2023-09-07 RX ADMIN — AMLODIPINE BESYLATE 5 MG: 5 TABLET ORAL at 10:05

## 2023-09-07 RX ADMIN — IPRATROPIUM BROMIDE AND ALBUTEROL SULFATE 1 DOSE: .5; 2.5 SOLUTION RESPIRATORY (INHALATION) at 16:01

## 2023-09-07 RX ADMIN — APIXABAN 5 MG: 5 TABLET, FILM COATED ORAL at 19:41

## 2023-09-07 RX ADMIN — SODIUM CHLORIDE, PRESERVATIVE FREE 40 MG: 5 INJECTION INTRAVENOUS at 10:05

## 2023-09-07 RX ADMIN — HYDRALAZINE HYDROCHLORIDE 25 MG: 25 TABLET, FILM COATED ORAL at 10:05

## 2023-09-07 RX ADMIN — ACETAMINOPHEN 650 MG: 325 TABLET ORAL at 19:41

## 2023-09-07 RX ADMIN — LEVOTHYROXINE SODIUM 75 MCG: 0.07 TABLET ORAL at 06:13

## 2023-09-07 RX ADMIN — IPRATROPIUM BROMIDE AND ALBUTEROL SULFATE 1 DOSE: .5; 2.5 SOLUTION RESPIRATORY (INHALATION) at 19:19

## 2023-09-07 RX ADMIN — POTASSIUM CHLORIDE: 2 INJECTION, SOLUTION, CONCENTRATE INTRAVENOUS at 14:51

## 2023-09-07 RX ADMIN — BUDESONIDE INHALATION 500 MCG: 0.5 SUSPENSION RESPIRATORY (INHALATION) at 08:29

## 2023-09-07 RX ADMIN — ARFORMOTEROL TARTRATE 15 MCG: 15 SOLUTION RESPIRATORY (INHALATION) at 19:20

## 2023-09-07 ASSESSMENT — PAIN DESCRIPTION - PAIN TYPE: TYPE: ACUTE PAIN

## 2023-09-07 ASSESSMENT — PAIN SCALES - GENERAL
PAINLEVEL_OUTOF10: 7
PAINLEVEL_OUTOF10: 0
PAINLEVEL_OUTOF10: 7
PAINLEVEL_OUTOF10: 0
PAINLEVEL_OUTOF10: 4
PAINLEVEL_OUTOF10: 7
PAINLEVEL_OUTOF10: 10

## 2023-09-07 ASSESSMENT — PAIN DESCRIPTION - LOCATION
LOCATION: GENERALIZED
LOCATION: BACK;CHEST;ABDOMEN
LOCATION: ABDOMEN;CHEST
LOCATION: ABDOMEN

## 2023-09-07 ASSESSMENT — PAIN DESCRIPTION - ORIENTATION
ORIENTATION: RIGHT;LEFT
ORIENTATION: RIGHT;LEFT

## 2023-09-07 ASSESSMENT — PAIN DESCRIPTION - DESCRIPTORS
DESCRIPTORS: ACHING;DISCOMFORT;SORE
DESCRIPTORS: ACHING;DISCOMFORT

## 2023-09-07 ASSESSMENT — PAIN DESCRIPTION - FREQUENCY: FREQUENCY: CONTINUOUS

## 2023-09-07 ASSESSMENT — PAIN DESCRIPTION - ONSET: ONSET: ON-GOING

## 2023-09-07 ASSESSMENT — PAIN - FUNCTIONAL ASSESSMENT: PAIN_FUNCTIONAL_ASSESSMENT: ACTIVITIES ARE NOT PREVENTED

## 2023-09-07 NOTE — CARE COORDINATION
Spoke with Kim Negron, they will order and have NIV for patient at facility. Also discussed with them not to titrate oxygen below 6L at facility. Auth good through tomorrow. Spoke with attending, for possible discharge today. Pasrr and ambulance on soft chart. Updated spouse by phone on acceptance and approval by insurance. [phone] Spoke with pulm, patient discharge on hold today. For questions I can be reached at [phone].  [place], [place]

## 2023-09-07 NOTE — PROGRESS NOTES
Went to remove bipap from patient in order for her to eat dinner and she politely asked to stay on the bipap due to wanting more time to sleep.

## 2023-09-07 NOTE — PROGRESS NOTES
Date: 9/7/2023    Time: 12:25 PM    Patient Placed On BIPAP/CPAP/ Non-Invasive Ventilation? Yes    If no must comment. Facial area red/color change? No           If YES are Blister/Lesion present? No   If yes must notify nursing staff  BIPAP/CPAP skin barrier? Yes    Skin barrier type:mepilexlite       Comments:  Patient placed on NIV per order for 2 hours on and 4 hours off.       Dony KRYSTLE Cabrera

## 2023-09-07 NOTE — PROGRESS NOTES
Comprehensive Nutrition Assessment    Type and Reason for Visit:  Reassess    Nutrition Recommendations/Plan:   Continue current diet. Recommend and start ONS : Magic cup BID to promote/optimize PO intake. Will continue to monitor. Malnutrition Assessment:  Malnutrition Status: Moderate malnutrition (08/22/23 1034)    Context:  Chronic Illness     Findings of the 6 clinical characteristics of malnutrition:  Energy Intake:  75% or less estimated energy requirements for 1 month or longer  Weight Loss:  Unable to assess (BAYRON  d/t Hx of CKD/CHF and possible fluid shifts + varied wt hx)     Body Fat Loss:  Mild body fat loss Orbital, Triceps   Muscle Mass Loss:  Mild muscle mass loss Temples (temporalis), Clavicles (pectoralis & deltoids), Scapula (trapezius)  Fluid Accumulation:  No significant fluid accumulation     Strength:  Not Performed    Nutrition Assessment:    Pt remains at risk d/t moderate malnutrition w/ ongoing poor intake. Noted nutrition progression-pt. now on pureed diet w/ limited intakes. Pt. would likely benefit from assistance at meals. Pt. extubated 8/29 to AVAPS/bipap & airvobipap. Admit w/ re-current respiratory distress now s/p cardiac arrest in ED (just d/c from hospital 8/18/)- suspected anoxic encephalopathy. Admit w/ Septic shock 2/2 PNA, IRMA (improved) & Shock Liver. Pt. also found to have chronic compression fx and Neurosurgery was consulted. PMHx COPD,Sarcoidosis/pulmonary fibrosis, CHF, previous SBO, colitis/ colectomy. Will start ONS to promote PO intake and continue to monitor. Nutrition Related Findings:    Oriented x2,  AVAPS/bipap & airvobipap, +I/O's 3.7 L, trace edema, active BS, rounded/soft abd, large abd hernia, mild eleavted Cr, hypoglycemia, Wound Type: None       Current Nutrition Intake & Therapies:    Average Meal Intake: 0%, 1-25%  Average Supplements Intake: None Ordered  ADULT DIET; Dysphagia - Pureed;  Moderately Thick (Honey)  ADULT ORAL NUTRITION Findings, Hemodynamic Status    Discharge Planning:     Too soon to determine     Nicole Garcia RD  Contact: ext [phone]

## 2023-09-08 ENCOUNTER — APPOINTMENT (OUTPATIENT)
Dept: GENERAL RADIOLOGY | Age: 68
DRG: 870 | End: 2023-09-08
Payer: MEDICARE

## 2023-09-08 LAB
ALBUMIN SERPL-MCNC: 3.4 G/DL (ref 3.5–5.2)
ALP SERPL-CCNC: 143 U/L (ref 35–104)
ALT SERPL-CCNC: 13 U/L (ref 0–32)
ANION GAP SERPL CALCULATED.3IONS-SCNC: 5 MMOL/L (ref 7–16)
AST SERPL-CCNC: 16 U/L (ref 0–31)
B.E.: 0.9 MMOL/L (ref -3–3)
BASOPHILS # BLD: 0.03 K/UL (ref 0–0.2)
BASOPHILS NFR BLD: 1 % (ref 0–2)
BILIRUB SERPL-MCNC: 0.5 MG/DL (ref 0–1.2)
BUN SERPL-MCNC: 13 MG/DL (ref 6–23)
CALCIUM SERPL-MCNC: 8.8 MG/DL (ref 8.6–10.2)
CHLORIDE SERPL-SCNC: 107 MMOL/L (ref 98–107)
CO2 SERPL-SCNC: 28 MMOL/L (ref 22–29)
COHB: 0.5 % (ref 0–1.5)
CREAT SERPL-MCNC: 1.1 MG/DL (ref 0.5–1)
CRITICAL: ABNORMAL
DATE ANALYZED: ABNORMAL
DATE OF COLLECTION: ABNORMAL
EOSINOPHIL # BLD: 0.11 K/UL (ref 0.05–0.5)
EOSINOPHILS RELATIVE PERCENT: 3 % (ref 0–6)
ERYTHROCYTE [DISTWIDTH] IN BLOOD BY AUTOMATED COUNT: 18.1 % (ref 11.5–15)
GFR SERPL CREATININE-BSD FRML MDRD: 58 ML/MIN/1.73M2
GLUCOSE SERPL-MCNC: 68 MG/DL (ref 74–99)
HCO3: 26.2 MMOL/L (ref 22–26)
HCT VFR BLD AUTO: 28.7 % (ref 34–48)
HGB BLD-MCNC: 8.7 G/DL (ref 11.5–15.5)
HHB: 6.7 % (ref 0–5)
IMM GRANULOCYTES # BLD AUTO: <0.03 K/UL (ref 0–0.58)
IMM GRANULOCYTES NFR BLD: 1 % (ref 0–5)
LAB: ABNORMAL
LYMPHOCYTES NFR BLD: 0.45 K/UL (ref 1.5–4)
LYMPHOCYTES RELATIVE PERCENT: 11 % (ref 20–42)
Lab: ABNORMAL
MCH RBC QN AUTO: 26 PG (ref 26–35)
MCHC RBC AUTO-ENTMCNC: 30.3 G/DL (ref 32–34.5)
MCV RBC AUTO: 85.7 FL (ref 80–99.9)
METHB: 0.6 % (ref 0–1.5)
MODE: ABNORMAL
MONOCYTES NFR BLD: 0.47 K/UL (ref 0.1–0.95)
MONOCYTES NFR BLD: 12 % (ref 2–12)
NEUTROPHILS NFR BLD: 73 % (ref 43–80)
NEUTS SEG NFR BLD: 2.86 K/UL (ref 1.8–7.3)
O2 CONTENT: 12.7 ML/DL
O2 SATURATION: 93.2 % (ref 92–98.5)
O2HB: 92.2 % (ref 94–97)
OPERATOR ID: 2220
PATIENT TEMP: 37 C
PCO2: 45.2 MMHG (ref 35–45)
PH BLOOD GAS: 7.38 (ref 7.35–7.45)
PLATELET, FLUORESCENCE: 138 K/UL (ref 130–450)
PMV BLD AUTO: 10.6 FL (ref 7–12)
PO2: 72 MMHG (ref 75–100)
POTASSIUM SERPL-SCNC: 4 MMOL/L (ref 3.5–5)
PROT SERPL-MCNC: 5.8 G/DL (ref 6.4–8.3)
RBC # BLD AUTO: 3.35 M/UL (ref 3.5–5.5)
RBC # BLD: ABNORMAL 10*6/UL
SODIUM SERPL-SCNC: 140 MMOL/L (ref 132–146)
SOURCE, BLOOD GAS: ABNORMAL
THB: 9.7 G/DL (ref 11.5–16.5)
TIME ANALYZED: 1453
WBC OTHER # BLD: 3.9 K/UL (ref 4.5–11.5)

## 2023-09-08 PROCEDURE — A4216 STERILE WATER/SALINE, 10 ML: HCPCS

## 2023-09-08 PROCEDURE — 94660 CPAP INITIATION&MGMT: CPT

## 2023-09-08 PROCEDURE — 99232 SBSQ HOSP IP/OBS MODERATE 35: CPT | Performed by: FAMILY MEDICINE

## 2023-09-08 PROCEDURE — 36415 COLL VENOUS BLD VENIPUNCTURE: CPT

## 2023-09-08 PROCEDURE — C9113 INJ PANTOPRAZOLE SODIUM, VIA: HCPCS

## 2023-09-08 PROCEDURE — 6370000000 HC RX 637 (ALT 250 FOR IP)

## 2023-09-08 PROCEDURE — 71045 X-RAY EXAM CHEST 1 VIEW: CPT

## 2023-09-08 PROCEDURE — 6360000002 HC RX W HCPCS

## 2023-09-08 PROCEDURE — 6370000000 HC RX 637 (ALT 250 FOR IP): Performed by: INTERNAL MEDICINE

## 2023-09-08 PROCEDURE — 2580000003 HC RX 258

## 2023-09-08 PROCEDURE — 94640 AIRWAY INHALATION TREATMENT: CPT

## 2023-09-08 PROCEDURE — 2060000000 HC ICU INTERMEDIATE R&B

## 2023-09-08 PROCEDURE — 6360000002 HC RX W HCPCS: Performed by: INTERNAL MEDICINE

## 2023-09-08 PROCEDURE — 80053 COMPREHEN METABOLIC PANEL: CPT

## 2023-09-08 PROCEDURE — 85025 COMPLETE CBC W/AUTO DIFF WBC: CPT

## 2023-09-08 PROCEDURE — 82805 BLOOD GASES W/O2 SATURATION: CPT

## 2023-09-08 RX ORDER — FLUCONAZOLE 150 MG/1
150 TABLET ORAL
Status: COMPLETED | OUTPATIENT
Start: 2023-09-08 | End: 2023-09-11

## 2023-09-08 RX ADMIN — HEPARIN 300 UNITS: 100 SYRINGE at 10:39

## 2023-09-08 RX ADMIN — HEPARIN 300 UNITS: 100 SYRINGE at 21:30

## 2023-09-08 RX ADMIN — ARFORMOTEROL TARTRATE 15 MCG: 15 SOLUTION RESPIRATORY (INHALATION) at 07:43

## 2023-09-08 RX ADMIN — APIXABAN 5 MG: 5 TABLET, FILM COATED ORAL at 21:54

## 2023-09-08 RX ADMIN — OXYCODONE HYDROCHLORIDE 2.5 MG: 5 TABLET ORAL at 20:31

## 2023-09-08 RX ADMIN — AMLODIPINE BESYLATE 5 MG: 5 TABLET ORAL at 10:39

## 2023-09-08 RX ADMIN — Medication 10 ML: at 21:30

## 2023-09-08 RX ADMIN — OXYCODONE HYDROCHLORIDE 2.5 MG: 5 TABLET ORAL at 11:09

## 2023-09-08 RX ADMIN — ARFORMOTEROL TARTRATE 15 MCG: 15 SOLUTION RESPIRATORY (INHALATION) at 21:11

## 2023-09-08 RX ADMIN — IPRATROPIUM BROMIDE AND ALBUTEROL SULFATE 1 DOSE: .5; 2.5 SOLUTION RESPIRATORY (INHALATION) at 15:49

## 2023-09-08 RX ADMIN — IPRATROPIUM BROMIDE AND ALBUTEROL SULFATE 1 DOSE: .5; 2.5 SOLUTION RESPIRATORY (INHALATION) at 07:43

## 2023-09-08 RX ADMIN — LEVETIRACETAM 500 MG: 100 INJECTION INTRAVENOUS at 10:41

## 2023-09-08 RX ADMIN — Medication 10 ML: at 10:43

## 2023-09-08 RX ADMIN — IPRATROPIUM BROMIDE AND ALBUTEROL SULFATE 1 DOSE: .5; 2.5 SOLUTION RESPIRATORY (INHALATION) at 12:17

## 2023-09-08 RX ADMIN — METOPROLOL TARTRATE 25 MG: 25 TABLET, FILM COATED ORAL at 10:42

## 2023-09-08 RX ADMIN — LEVOTHYROXINE SODIUM 75 MCG: 0.07 TABLET ORAL at 10:41

## 2023-09-08 RX ADMIN — LEVETIRACETAM 500 MG: 100 INJECTION INTRAVENOUS at 21:53

## 2023-09-08 RX ADMIN — FLUCONAZOLE 150 MG: 150 TABLET ORAL at 15:29

## 2023-09-08 RX ADMIN — SODIUM CHLORIDE, PRESERVATIVE FREE 40 MG: 5 INJECTION INTRAVENOUS at 10:40

## 2023-09-08 RX ADMIN — IPRATROPIUM BROMIDE AND ALBUTEROL SULFATE 1 DOSE: .5; 2.5 SOLUTION RESPIRATORY (INHALATION) at 21:11

## 2023-09-08 RX ADMIN — BUDESONIDE INHALATION 500 MCG: 0.5 SUSPENSION RESPIRATORY (INHALATION) at 07:43

## 2023-09-08 RX ADMIN — BUDESONIDE INHALATION 500 MCG: 0.5 SUSPENSION RESPIRATORY (INHALATION) at 21:11

## 2023-09-08 RX ADMIN — STANDARDIZED SENNA CONCENTRATE 8.6 MG: 8.6 TABLET ORAL at 21:54

## 2023-09-08 RX ADMIN — PREDNISONE 5 MG: 5 TABLET ORAL at 10:39

## 2023-09-08 RX ADMIN — APIXABAN 5 MG: 5 TABLET, FILM COATED ORAL at 10:39

## 2023-09-08 RX ADMIN — METOPROLOL TARTRATE 25 MG: 25 TABLET, FILM COATED ORAL at 21:54

## 2023-09-08 RX ADMIN — AMIODARONE HYDROCHLORIDE 200 MG: 200 TABLET ORAL at 10:39

## 2023-09-08 ASSESSMENT — PAIN DESCRIPTION - ONSET
ONSET: GRADUAL
ONSET: ON-GOING

## 2023-09-08 ASSESSMENT — PAIN DESCRIPTION - LOCATION
LOCATION: CHEST
LOCATION: HEAD

## 2023-09-08 ASSESSMENT — PAIN DESCRIPTION - DESCRIPTORS
DESCRIPTORS: ACHING;DULL
DESCRIPTORS: ACHING;DULL
DESCRIPTORS: ACHING;DISCOMFORT;SORE
DESCRIPTORS: ACHING;DISCOMFORT

## 2023-09-08 ASSESSMENT — PAIN SCALES - GENERAL
PAINLEVEL_OUTOF10: 8
PAINLEVEL_OUTOF10: 0
PAINLEVEL_OUTOF10: 8
PAINLEVEL_OUTOF10: 10

## 2023-09-08 ASSESSMENT — PAIN DESCRIPTION - PAIN TYPE
TYPE: ACUTE PAIN

## 2023-09-08 ASSESSMENT — PAIN DESCRIPTION - FREQUENCY
FREQUENCY: INTERMITTENT
FREQUENCY: CONTINUOUS

## 2023-09-08 ASSESSMENT — PAIN DESCRIPTION - ORIENTATION
ORIENTATION: MID;UPPER
ORIENTATION: MID

## 2023-09-08 NOTE — PROGRESS NOTES
[facility] Inpatient   Resident Progress Note    S:  Hospital day: 20    Brief Synopsis: Fatuma Gutierrez is a 78 y.o. female with a past medical history of pulmonary sarcoidosis, atrial fibrillation on Eliquis, pulmonary hypertension, diabetes insipidus, hypothyroidism, hypertension, chronic kidney disease stage IIIa and combined systolic/diastolic heart failure who presents to the emergency department for shortness of breath. Patient was recently discharged from [facility] (08/18/23) due to acute hypoxic respiratory failure. Patient has had numerous hospital admissions for the same complaint. Hypotensive, tachycardic and hypoxic at presentation. She did decompensate and ended up in PEA. 2 rounds of CPR were performed and ROSC achieved. Patient admitted to the intensive care unit for acute hypoxic respiratory failure requiring intubation and vasopressors. She was extubated and on Bipap 08/29. She was denied LTAC at [facility] due to subactue compression fracture seen on 08/19 on CT and insufficient nutritional status as she was NPO due to BiPaP. She remained on BiPap and was slowly weaned to [facility] which she tolerated well. She was reevaluated with a swallow eval and able to be on puree diet. Neurosurgery consulted for the compression fracture which was stable and consulted PT/OT. Patient constantly complained of pain in her chest which is due to chest compressions she received on admission. She is being given tylenol, lidocaine patches and occasional one time pain relief for the pain; being cautious not to  impair her respiratory drive. Also managed with hydroxyzine PRN for anxiety. Social work re-evaluated her for [facility] and will be going to [employer] on discharge. Sepsis work up was done on 09/06 as patient was not looking well enough to be discharged to [address] [place] [address]. ABG showed poor oxygenation PO2 of 58 and poor O2 saturation SaO2 90%.  Also urine cx was positive for candida tropicalis which she has deficiency anemia  Leukopenia  Transfused 2 units RBC ON 8/23. monitor H/H and transfused if hemoglobin < 7     Paroxysmal atrial fibrillation  Continue amiodarone 200 mg daily, Toprol XL, Eliquis     Compression fractures   CF L5 and T12 indicated in CTAP 8/19/23; appears to be chronic. PT on hold due to this. Neurosurgery evaluated patient:- Fractures are stable. PT on board    Anxiety  Chest Pain  2/2 chest compressions vs anxiety  Zanaflex discontinued. Lidocaine patches   Torodol IV one time dose ordered 09/05  Oxy 2.5mg 09/07  Hydroxyzine  PRN    Altered Mental Status - 09/06  Sepsis workup   Pending Tropinin, Blood cx  ABG PaO2 - 58mmHg 09/06; pulmonology on board  Repeat abg PaO2 -52.6mmHg  CXR - Hyperinflation and interstitial prominence. Trace bilateral pleural effusions/thickening. Bibasilar subsegmental   atelectasis or scarring. 09/06  Repeat CXR - Increasing right lower lobe opacity consistent with atelectasis and/or   pneumonia. 09/07  CT head wo contrast - no acute intracranial abnormality  Urine Cx revealed candida tropicalis >100,000; patient started on diflucan 150mg po    Vaginitis  Urine Cx revealed candida tropicalis >100,000  Patient started on diflucan 150mg po      DVT/GI prophylaxis: Eliquis/protonix  Diet: Pureed, honey thick liquids  Disposition: Continue current management. Placement to [facility] on home, considering re appealing to LTAC.     Electronically signed by Patricia Butts MD on 9/8/2023 at 11:35 AM  This case was discussed with attending physician Dr. Geo Canales

## 2023-09-08 NOTE — CARE COORDINATION
Auth for [employer]	[place] only good through today. If not stable today will have therapy complete therapy treat over the weekend and resubmit if appropriate on Monday. Pasrr and ambulance on soft chart. For questions I can be reached at [phone].  [place], [place]

## 2023-09-08 NOTE — PROGRESS NOTES
Patient on NIV AVAPS, per order patient is to wear NIV intermittently for two hours on and four hours off. Patient is refusing to be removed from NIV this AM when RT asked her.       09/08/23 0844   NIV Type   NIV Started/Stopped On   Equipment Type V60   Mode AVAPS   Mask Type Full face mask   Mask Size Small   Assessment   Pulse 62   Heart Rate Source Monitor   Respirations 23   SpO2 95 %   Level of Consciousness 0   Comfort Level Good   Using Accessory Muscles No   Mask Compliance Good   Skin Assessment Clean, dry, & intact   Skin Protection for O2 Device Yes   Orientation Middle   Location Nose   Intervention(s) Skin Barrier   Settings/Measurements   PIP Observed 22 cm H20   CPAP/EPAP 8 cmH2O   IPAP Min 15 cmH2O   IPAP Max 25 cmH2O   Vt (Set, mL) 400 mL   Vt (Measured) 452 mL   Rate Ordered 16   Insp Rise Time (%) 3 %   FiO2  50 %   I Time/ I Time % 0.75 s   Minute Volume (L/min) 10.2 Liters   Mask Leak (lpm) 46 lpm   Patient's Home Machine No   Alarm Settings   Alarms On Y   Low Pressure (cmH2O) 10 cmH2O   High Pressure (cmH2O) 30 cmH2O   RR Low (bpm) 15   RR High (bpm) 40 br/min

## 2023-09-08 NOTE — PLAN OF CARE
Problem: Chronic Conditions and Co-morbidities  Goal: Patient's chronic conditions and co-morbidity symptoms are monitored and maintained or improved       Problem: Discharge Planning  Goal: Discharge to home or other facility with appropriate resources  9/7/2023 2250 by Raya Mendez RN  Outcome: Progressing       Problem: Safety - Adult  Goal: Free from fall injury  9/7/2023 2250 by Raya Mendez RN  Outcome: Progressing    Problem: Pain  Goal: Verbalizes/displays adequate comfort level or baseline comfort level  9/7/2023 2250 by Raya Mendez RN  Outcome: Progressing            Problem: Neurosensory - Adult  Goal: Achieves stable or improved neurological status  Outcome: Progressing  Flowsheets (Taken 9/7/2023 1940)  Achieves stable or improved neurological status: Assess for and report changes in neurological status  Goal: Remains free of injury related to seizures activity  Outcome: Progressing     Problem: Respiratory - Adult  Goal: Achieves optimal ventilation and oxygenation  Outcome: Progressing     Problem: Skin/Tissue Integrity - Adult  Goal: Skin integrity remains intact  Outcome: Progressing

## 2023-09-09 LAB
ALBUMIN SERPL-MCNC: 3.3 G/DL (ref 3.5–5.2)
ALP SERPL-CCNC: 139 U/L (ref 35–104)
ALT SERPL-CCNC: 11 U/L (ref 0–32)
ANION GAP SERPL CALCULATED.3IONS-SCNC: 13 MMOL/L (ref 7–16)
ANION GAP SERPL CALCULATED.3IONS-SCNC: 9 MMOL/L (ref 7–16)
AST SERPL-CCNC: 13 U/L (ref 0–31)
BASOPHILS # BLD: 0.02 K/UL (ref 0–0.2)
BASOPHILS NFR BLD: 0 % (ref 0–2)
BILIRUB SERPL-MCNC: 0.3 MG/DL (ref 0–1.2)
BUN SERPL-MCNC: 12 MG/DL (ref 6–23)
BUN SERPL-MCNC: 13 MG/DL (ref 6–23)
CALCIUM SERPL-MCNC: 8.6 MG/DL (ref 8.6–10.2)
CALCIUM SERPL-MCNC: 8.8 MG/DL (ref 8.6–10.2)
CHLORIDE SERPL-SCNC: 108 MMOL/L (ref 98–107)
CHLORIDE SERPL-SCNC: 111 MMOL/L (ref 98–107)
CO2 SERPL-SCNC: 24 MMOL/L (ref 22–29)
CO2 SERPL-SCNC: 27 MMOL/L (ref 22–29)
CREAT SERPL-MCNC: 1.1 MG/DL (ref 0.5–1)
CREAT SERPL-MCNC: 1.2 MG/DL (ref 0.5–1)
EOSINOPHIL # BLD: 0.13 K/UL (ref 0.05–0.5)
EOSINOPHILS RELATIVE PERCENT: 3 % (ref 0–6)
ERYTHROCYTE [DISTWIDTH] IN BLOOD BY AUTOMATED COUNT: 18.2 % (ref 11.5–15)
GFR SERPL CREATININE-BSD FRML MDRD: 50 ML/MIN/1.73M2
GFR SERPL CREATININE-BSD FRML MDRD: 53 ML/MIN/1.73M2
GLUCOSE SERPL-MCNC: 110 MG/DL (ref 74–99)
GLUCOSE SERPL-MCNC: 80 MG/DL (ref 74–99)
HCT VFR BLD AUTO: 27.2 % (ref 34–48)
HGB BLD-MCNC: 8.2 G/DL (ref 11.5–15.5)
IMM GRANULOCYTES # BLD AUTO: 0.03 K/UL (ref 0–0.58)
IMM GRANULOCYTES NFR BLD: 1 % (ref 0–5)
LYMPHOCYTES NFR BLD: 0.49 K/UL (ref 1.5–4)
LYMPHOCYTES RELATIVE PERCENT: 10 % (ref 20–42)
MCH RBC QN AUTO: 26.5 PG (ref 26–35)
MCHC RBC AUTO-ENTMCNC: 30.1 G/DL (ref 32–34.5)
MCV RBC AUTO: 87.7 FL (ref 80–99.9)
MONOCYTES NFR BLD: 0.67 K/UL (ref 0.1–0.95)
MONOCYTES NFR BLD: 13 % (ref 2–12)
NEUTROPHILS NFR BLD: 73 % (ref 43–80)
NEUTS SEG NFR BLD: 3.7 K/UL (ref 1.8–7.3)
PLATELET, FLUORESCENCE: 142 K/UL (ref 130–450)
PMV BLD AUTO: 13.1 FL (ref 7–12)
POTASSIUM SERPL-SCNC: 4 MMOL/L (ref 3.5–5)
POTASSIUM SERPL-SCNC: 4.3 MMOL/L (ref 3.5–5)
PROT SERPL-MCNC: 5.4 G/DL (ref 6.4–8.3)
RBC # BLD AUTO: 3.1 M/UL (ref 3.5–5.5)
RBC # BLD: ABNORMAL 10*6/UL
SODIUM SERPL-SCNC: 144 MMOL/L (ref 132–146)
SODIUM SERPL-SCNC: 148 MMOL/L (ref 132–146)
WBC OTHER # BLD: 5 K/UL (ref 4.5–11.5)

## 2023-09-09 PROCEDURE — A4216 STERILE WATER/SALINE, 10 ML: HCPCS

## 2023-09-09 PROCEDURE — 6370000000 HC RX 637 (ALT 250 FOR IP): Performed by: INTERNAL MEDICINE

## 2023-09-09 PROCEDURE — 80048 BASIC METABOLIC PNL TOTAL CA: CPT

## 2023-09-09 PROCEDURE — 2060000000 HC ICU INTERMEDIATE R&B

## 2023-09-09 PROCEDURE — 6370000000 HC RX 637 (ALT 250 FOR IP)

## 2023-09-09 PROCEDURE — 93005 ELECTROCARDIOGRAM TRACING: CPT

## 2023-09-09 PROCEDURE — 85025 COMPLETE CBC W/AUTO DIFF WBC: CPT

## 2023-09-09 PROCEDURE — 6360000002 HC RX W HCPCS

## 2023-09-09 PROCEDURE — 94640 AIRWAY INHALATION TREATMENT: CPT

## 2023-09-09 PROCEDURE — 6360000002 HC RX W HCPCS: Performed by: INTERNAL MEDICINE

## 2023-09-09 PROCEDURE — 2580000003 HC RX 258: Performed by: INTERNAL MEDICINE

## 2023-09-09 PROCEDURE — 2580000003 HC RX 258

## 2023-09-09 PROCEDURE — C9113 INJ PANTOPRAZOLE SODIUM, VIA: HCPCS

## 2023-09-09 PROCEDURE — 94660 CPAP INITIATION&MGMT: CPT

## 2023-09-09 PROCEDURE — 2700000000 HC OXYGEN THERAPY PER DAY

## 2023-09-09 PROCEDURE — 99232 SBSQ HOSP IP/OBS MODERATE 35: CPT | Performed by: FAMILY MEDICINE

## 2023-09-09 PROCEDURE — 80053 COMPREHEN METABOLIC PANEL: CPT

## 2023-09-09 RX ORDER — DEXTROSE MONOHYDRATE 50 MG/ML
INJECTION, SOLUTION INTRAVENOUS CONTINUOUS
Status: DISCONTINUED | OUTPATIENT
Start: 2023-09-09 | End: 2023-09-11

## 2023-09-09 RX ADMIN — METOPROLOL TARTRATE 25 MG: 25 TABLET, FILM COATED ORAL at 08:30

## 2023-09-09 RX ADMIN — ACETAMINOPHEN 650 MG: 325 TABLET ORAL at 06:52

## 2023-09-09 RX ADMIN — POLYETHYLENE GLYCOL 3350 17 GRAM ORAL POWDER PACKET 17 G: at 08:29

## 2023-09-09 RX ADMIN — PREDNISONE 5 MG: 5 TABLET ORAL at 08:31

## 2023-09-09 RX ADMIN — BUDESONIDE INHALATION 500 MCG: 0.5 SUSPENSION RESPIRATORY (INHALATION) at 19:46

## 2023-09-09 RX ADMIN — IPRATROPIUM BROMIDE AND ALBUTEROL SULFATE 1 DOSE: .5; 2.5 SOLUTION RESPIRATORY (INHALATION) at 19:46

## 2023-09-09 RX ADMIN — OXYCODONE HYDROCHLORIDE 2.5 MG: 5 TABLET ORAL at 22:59

## 2023-09-09 RX ADMIN — HEPARIN 300 UNITS: 100 SYRINGE at 23:03

## 2023-09-09 RX ADMIN — METOPROLOL TARTRATE 25 MG: 25 TABLET, FILM COATED ORAL at 22:59

## 2023-09-09 RX ADMIN — IPRATROPIUM BROMIDE AND ALBUTEROL SULFATE 1 DOSE: .5; 2.5 SOLUTION RESPIRATORY (INHALATION) at 08:17

## 2023-09-09 RX ADMIN — IPRATROPIUM BROMIDE AND ALBUTEROL SULFATE 1 DOSE: .5; 2.5 SOLUTION RESPIRATORY (INHALATION) at 11:29

## 2023-09-09 RX ADMIN — SODIUM CHLORIDE, PRESERVATIVE FREE 40 MG: 5 INJECTION INTRAVENOUS at 08:29

## 2023-09-09 RX ADMIN — Medication 10 ML: at 08:33

## 2023-09-09 RX ADMIN — AMLODIPINE BESYLATE 5 MG: 5 TABLET ORAL at 08:31

## 2023-09-09 RX ADMIN — OXYCODONE HYDROCHLORIDE 2.5 MG: 5 TABLET ORAL at 11:30

## 2023-09-09 RX ADMIN — Medication 10 ML: at 08:32

## 2023-09-09 RX ADMIN — HYDROXYZINE HYDROCHLORIDE 10 MG: 10 TABLET ORAL at 17:53

## 2023-09-09 RX ADMIN — Medication 10 ML: at 21:30

## 2023-09-09 RX ADMIN — LEVETIRACETAM 500 MG: 100 INJECTION INTRAVENOUS at 22:59

## 2023-09-09 RX ADMIN — LEVOTHYROXINE SODIUM 75 MCG: 0.07 TABLET ORAL at 06:53

## 2023-09-09 RX ADMIN — APIXABAN 5 MG: 5 TABLET, FILM COATED ORAL at 08:31

## 2023-09-09 RX ADMIN — LEVETIRACETAM 500 MG: 100 INJECTION INTRAVENOUS at 08:29

## 2023-09-09 RX ADMIN — HEPARIN 300 UNITS: 100 SYRINGE at 08:32

## 2023-09-09 RX ADMIN — BUDESONIDE INHALATION 500 MCG: 0.5 SUSPENSION RESPIRATORY (INHALATION) at 08:17

## 2023-09-09 RX ADMIN — ARFORMOTEROL TARTRATE 15 MCG: 15 SOLUTION RESPIRATORY (INHALATION) at 08:17

## 2023-09-09 RX ADMIN — DEXTROSE MONOHYDRATE: 50 INJECTION, SOLUTION INTRAVENOUS at 12:20

## 2023-09-09 RX ADMIN — ARFORMOTEROL TARTRATE 15 MCG: 15 SOLUTION RESPIRATORY (INHALATION) at 19:45

## 2023-09-09 RX ADMIN — OXYCODONE HYDROCHLORIDE 2.5 MG: 5 TABLET ORAL at 17:53

## 2023-09-09 RX ADMIN — APIXABAN 5 MG: 5 TABLET, FILM COATED ORAL at 22:59

## 2023-09-09 RX ADMIN — IPRATROPIUM BROMIDE AND ALBUTEROL SULFATE 1 DOSE: .5; 2.5 SOLUTION RESPIRATORY (INHALATION) at 16:30

## 2023-09-09 RX ADMIN — AMIODARONE HYDROCHLORIDE 200 MG: 200 TABLET ORAL at 08:31

## 2023-09-09 RX ADMIN — Medication 20 ML: at 23:02

## 2023-09-09 ASSESSMENT — PAIN DESCRIPTION - DESCRIPTORS
DESCRIPTORS: SHARP
DESCRIPTORS: ACHING
DESCRIPTORS: SHARP

## 2023-09-09 ASSESSMENT — PAIN SCALES - GENERAL
PAINLEVEL_OUTOF10: 0
PAINLEVEL_OUTOF10: 5
PAINLEVEL_OUTOF10: 9
PAINLEVEL_OUTOF10: 0
PAINLEVEL_OUTOF10: 0
PAINLEVEL_OUTOF10: 2
PAINLEVEL_OUTOF10: 10
PAINLEVEL_OUTOF10: 10
PAINLEVEL_OUTOF10: 0
PAINLEVEL_OUTOF10: 6

## 2023-09-09 ASSESSMENT — PAIN DESCRIPTION - ORIENTATION
ORIENTATION: MID
ORIENTATION: MID

## 2023-09-09 ASSESSMENT — PAIN DESCRIPTION - LOCATION
LOCATION: ABDOMEN;CHEST
LOCATION: ABDOMEN
LOCATION: CHEST;STERNUM
LOCATION: CHEST;STERNUM
LOCATION: BACK;CHEST

## 2023-09-09 NOTE — PROGRESS NOTES
Date: 9/9/2023    Time: 11:41 AM    Patient Placed On BIPAP/CPAP/ Non-Invasive Ventilation? Yes    If no must comment. Facial area red/color change? No           If YES are Blister/Lesion present? No   If yes must notify nursing staff  BIPAP/CPAP skin barrier?   Yes    Skin barrier type:mepilexlite         Clementine Andrade, [facility]      09/09/23 1139   NIV Type   NIV Started/Stopped On   Equipment Type V60   Mode AVAPS   Mask Type Full face mask   Mask Size Small   Assessment   Pulse 55   Heart Rate Source Monitor   Respirations 18   SpO2 96 %   Level of Consciousness 0   Comfort Level Good   Using Accessory Muscles No   Mask Compliance Good   Skin Assessment Clean, dry, & intact   Skin Protection for O2 Device Yes   Orientation Middle   Location Nose   Intervention(s) Skin Barrier   Breath Sounds   Right Upper Lobe Diminished   Right Middle Lobe Diminished   Right Lower Lobe Diminished   Left Upper Lobe Diminished   Left Lower Lobe Diminished   Settings/Measurements   PIP Observed 20 cm H20   CPAP/EPAP 8 cmH2O   IPAP Min 20 cmH2O   IPAP Max 25 cmH2O   Vt (Set, mL) 400 mL   Vt (Measured) 679 mL   Rate Ordered 16   Insp Rise Time (%) 2 %   FiO2  50 %   I Time/ I Time % 0.9 s   Minute Volume (L/min) 6.5 Liters   Mask Leak (lpm) 39 lpm   Patient's Home Machine No   Alarm Settings   Alarms On Y

## 2023-09-09 NOTE — PROGRESS NOTES
[facility] Inpatient   Resident Progress Note    S:  Hospital day: 21    Brief Synopsis: Edwin Ramon is a 78 y.o. female with a past medical history of pulmonary sarcoidosis, atrial fibrillation on Eliquis, pulmonary hypertension, diabetes insipidus, hypothyroidism, hypertension, chronic kidney disease stage IIIa and combined systolic/diastolic heart failure who presents to the emergency department for shortness of breath. Patient was recently discharged from [facility] (08/18/23) due to acute hypoxic respiratory failure. Patient has had numerous hospital admissions for the same complaint. Hypotensive, tachycardic and hypoxic at presentation. She did decompensate and ended up in PEA. 2 rounds of CPR were performed and ROSC achieved. Patient admitted to the intensive care unit for acute hypoxic respiratory failure requiring intubation and vasopressors. She was extubated and on Bipap 08/29. She was denied LTAC at [facility] due to subactue compression fracture seen on 08/19 on CT and insufficient nutritional status as she was NPO due to BiPaP. She remained on BiPap and was slowly weaned to [facility] which she tolerated well. She was reevaluated with a swallow eval and able to be on puree diet. Neurosurgery consulted for the compression fracture which was stable and consulted PT/OT. Patient constantly complained of pain in her chest which is due to chest compressions she received on admission. She is being given tylenol, lidocaine patches and occasional one time pain relief for the pain; being cautious not to  impair her respiratory drive. Also managed with hydroxyzine PRN for anxiety. Social work re-evaluated her for FRITZ and will be going to [employer] on discharge. Sepsis work up was done on 09/06 as patient was not looking well enough to be discharged to [address] [place] [address]. ABG showed poor oxygenation PO2 of 58 and poor O2 saturation SaO2 90%.  Also urine cx was positive for candida tropicalis which she has Increased bibasilar linear opacities suggest worsening atelectasis. 3. The position and alignment of the tubes and catheters appear within the   normal range         XR CHEST PORTABLE   Final Result   Cardiomediastinal silhouette appears enlarged. Prominent hilar/perihilar structures probably reflecting prominent pulmonary   vessels however cannot entirely exclude nodule/mass. Would advise a   follow-up PA chest radiograph. Mild bibasilar atelectasis or infiltrate. No gross pneumothorax. Deformity of the right humeral head. XR CHEST PORTABLE    (Results Pending)       A/P:  Principal Problem:    Cardiac arrest ([address])  Active Problems:    Altered mental status    Pulmonary hypertension    Pneumonia due to infectious organism    Moderate protein-calorie malnutrition (HCC)    Shortness of breath    Palliative care encounter    Palliative care by specialist  Resolved Problems:    * No resolved hospital problems. *      Acute hypoxic respiratory failure  Status post cardiac arrest  Left lower lobe pneumonia  Severe pulmonary hypertension  Pulmonary sarcoidosis  History of moderate COPD, stage II by gold classification on 5 to 6 L at home  On 6 L HFNC, wean as tolerated. On 5-6L at home   ProBNP  downtrending 9,191pg/ml 09/07  Continue solumedrol 40 mg IV every 8 hours  Continue Toprol XL and hydralazine. Continue home medication Prednisone for Sarcoid  Bipap 2 hrs on and 4 hrs off and at night; O2 9L    Chronic kidney disease  Hyponatremia and hypochloremia  History of diabetes insipidus  Nephro on board: discontinue Kcl 20 in D5 40 cc/h; hydralazine on hold  Monitor intake/output   Nephro and pulm deciding risk and benefit of diuresing patient at the time.     Constipation - improved   Continue polyethylene glycol and senna  CT abdomen/pelvis was unremarkable for any mechanical obstruction 95/83/24  Stable umbilical hernia; Gen surg consulted with suspecting cecal volvulus - Continue to monitor

## 2023-09-09 NOTE — PLAN OF CARE
Problem: Chronic Conditions and Co-morbidities  Goal: Patient's chronic conditions and co-morbidity symptoms are monitored and maintained or improved  Outcome: Progressing     Problem: Discharge Planning  Goal: Discharge to home or other facility with appropriate resources  Outcome: Progressing     Problem: Safety - Adult  Goal: Free from fall injury  Outcome: Progressing     Problem: Pain  Goal: Verbalizes/displays adequate comfort level or baseline comfort level  Outcome: Progressing     Problem: Skin/Tissue Integrity  Goal: Absence of new skin breakdown  Description: 1. Monitor for areas of redness and/or skin breakdown  2. Assess vascular access sites hourly  3. Every 4-6 hours minimum:  Change oxygen saturation probe site  4. Every 4-6 hours:  If on nasal continuous positive airway pressure, respiratory therapy assess nares and determine need for appliance change or resting period.   Outcome: Progressing     Problem: ABCDS Injury Assessment  Goal: Absence of physical injury  Outcome: Progressing     Problem: Neurosensory - Adult  Goal: Achieves stable or improved neurological status  Outcome: Progressing  Goal: Absence of seizures  Outcome: Progressing  Goal: Remains free of injury related to seizures activity  Outcome: Progressing  Goal: Achieves maximal functionality and self care  Outcome: Progressing     Problem: Respiratory - Adult  Goal: Achieves optimal ventilation and oxygenation  Outcome: Progressing     Problem: Cardiovascular - Adult  Goal: Maintains optimal cardiac output and hemodynamic stability  Outcome: Progressing  Goal: Absence of cardiac dysrhythmias or at baseline  Outcome: Progressing     Problem: Skin/Tissue Integrity - Adult  Goal: Skin integrity remains intact  Outcome: Progressing  Goal: Incisions, wounds, or drain sites healing without S/S of infection  Outcome: Progressing  Goal: Oral mucous membranes remain intact  Outcome: Progressing     Problem: Nutrition Deficit:  Goal:

## 2023-09-09 NOTE — PROGRESS NOTES
Patinent reported 10/10 chest that is sharp and located near sternum. Perfect serve sent out to Dr. Nandini Julien.  Giving patient prn pain meds as directed  Electronically signed by Ny Evangelista RN on 9/9/2023 at 11:40 AM

## 2023-09-10 LAB
ANION GAP SERPL CALCULATED.3IONS-SCNC: 8 MMOL/L (ref 7–16)
BUN SERPL-MCNC: 12 MG/DL (ref 6–23)
CALCIUM SERPL-MCNC: 8.4 MG/DL (ref 8.6–10.2)
CHLORIDE SERPL-SCNC: 106 MMOL/L (ref 98–107)
CO2 SERPL-SCNC: 29 MMOL/L (ref 22–29)
CREAT SERPL-MCNC: 1.1 MG/DL (ref 0.5–1)
ERYTHROCYTE [DISTWIDTH] IN BLOOD BY AUTOMATED COUNT: 17.9 % (ref 11.5–15)
GFR SERPL CREATININE-BSD FRML MDRD: 56 ML/MIN/1.73M2
GLUCOSE SERPL-MCNC: 79 MG/DL (ref 74–99)
HCT VFR BLD AUTO: 26.9 % (ref 34–48)
HGB BLD-MCNC: 8 G/DL (ref 11.5–15.5)
MAGNESIUM SERPL-MCNC: 1.8 MG/DL (ref 1.6–2.6)
MCH RBC QN AUTO: 26 PG (ref 26–35)
MCHC RBC AUTO-ENTMCNC: 29.7 G/DL (ref 32–34.5)
MCV RBC AUTO: 87.3 FL (ref 80–99.9)
PLATELET # BLD AUTO: 146 K/UL (ref 130–450)
PMV BLD AUTO: 11.5 FL (ref 7–12)
POTASSIUM SERPL-SCNC: 3.7 MMOL/L (ref 3.5–5)
RBC # BLD AUTO: 3.08 M/UL (ref 3.5–5.5)
SODIUM SERPL-SCNC: 143 MMOL/L (ref 132–146)
WBC OTHER # BLD: 5.9 K/UL (ref 4.5–11.5)

## 2023-09-10 PROCEDURE — 6360000002 HC RX W HCPCS: Performed by: INTERNAL MEDICINE

## 2023-09-10 PROCEDURE — 6360000002 HC RX W HCPCS

## 2023-09-10 PROCEDURE — 2580000003 HC RX 258: Performed by: INTERNAL MEDICINE

## 2023-09-10 PROCEDURE — A4216 STERILE WATER/SALINE, 10 ML: HCPCS

## 2023-09-10 PROCEDURE — 2580000003 HC RX 258

## 2023-09-10 PROCEDURE — 6370000000 HC RX 637 (ALT 250 FOR IP): Performed by: INTERNAL MEDICINE

## 2023-09-10 PROCEDURE — C9113 INJ PANTOPRAZOLE SODIUM, VIA: HCPCS

## 2023-09-10 PROCEDURE — 99232 SBSQ HOSP IP/OBS MODERATE 35: CPT | Performed by: FAMILY MEDICINE

## 2023-09-10 PROCEDURE — 80048 BASIC METABOLIC PNL TOTAL CA: CPT

## 2023-09-10 PROCEDURE — 94660 CPAP INITIATION&MGMT: CPT

## 2023-09-10 PROCEDURE — 85027 COMPLETE CBC AUTOMATED: CPT

## 2023-09-10 PROCEDURE — 2060000000 HC ICU INTERMEDIATE R&B

## 2023-09-10 PROCEDURE — 6370000000 HC RX 637 (ALT 250 FOR IP)

## 2023-09-10 PROCEDURE — 2700000000 HC OXYGEN THERAPY PER DAY

## 2023-09-10 PROCEDURE — 94640 AIRWAY INHALATION TREATMENT: CPT

## 2023-09-10 PROCEDURE — 83735 ASSAY OF MAGNESIUM: CPT

## 2023-09-10 RX ADMIN — DEXTROSE MONOHYDRATE: 50 INJECTION, SOLUTION INTRAVENOUS at 06:01

## 2023-09-10 RX ADMIN — ARFORMOTEROL TARTRATE 15 MCG: 15 SOLUTION RESPIRATORY (INHALATION) at 20:33

## 2023-09-10 RX ADMIN — LEVETIRACETAM 500 MG: 100 INJECTION INTRAVENOUS at 21:35

## 2023-09-10 RX ADMIN — LEVOTHYROXINE SODIUM 75 MCG: 0.07 TABLET ORAL at 05:58

## 2023-09-10 RX ADMIN — DEXTROSE MONOHYDRATE: 50 INJECTION, SOLUTION INTRAVENOUS at 22:46

## 2023-09-10 RX ADMIN — PREDNISONE 5 MG: 5 TABLET ORAL at 09:15

## 2023-09-10 RX ADMIN — APIXABAN 5 MG: 5 TABLET, FILM COATED ORAL at 09:14

## 2023-09-10 RX ADMIN — HEPARIN 300 UNITS: 100 SYRINGE at 09:14

## 2023-09-10 RX ADMIN — BUDESONIDE INHALATION 500 MCG: 0.5 SUSPENSION RESPIRATORY (INHALATION) at 08:02

## 2023-09-10 RX ADMIN — HEPARIN 300 UNITS: 100 SYRINGE at 21:36

## 2023-09-10 RX ADMIN — ACETAMINOPHEN 650 MG: 325 TABLET ORAL at 05:58

## 2023-09-10 RX ADMIN — AMLODIPINE BESYLATE 5 MG: 5 TABLET ORAL at 09:15

## 2023-09-10 RX ADMIN — AMIODARONE HYDROCHLORIDE 200 MG: 200 TABLET ORAL at 09:15

## 2023-09-10 RX ADMIN — METOPROLOL TARTRATE 25 MG: 25 TABLET, FILM COATED ORAL at 09:14

## 2023-09-10 RX ADMIN — IPRATROPIUM BROMIDE AND ALBUTEROL SULFATE 1 DOSE: .5; 2.5 SOLUTION RESPIRATORY (INHALATION) at 08:02

## 2023-09-10 RX ADMIN — Medication 10 ML: at 09:13

## 2023-09-10 RX ADMIN — IPRATROPIUM BROMIDE AND ALBUTEROL SULFATE 1 DOSE: .5; 2.5 SOLUTION RESPIRATORY (INHALATION) at 12:38

## 2023-09-10 RX ADMIN — SODIUM CHLORIDE, PRESERVATIVE FREE 40 MG: 5 INJECTION INTRAVENOUS at 09:14

## 2023-09-10 RX ADMIN — Medication 10 ML: at 21:36

## 2023-09-10 RX ADMIN — BUDESONIDE INHALATION 500 MCG: 0.5 SUSPENSION RESPIRATORY (INHALATION) at 20:33

## 2023-09-10 RX ADMIN — IPRATROPIUM BROMIDE AND ALBUTEROL SULFATE 1 DOSE: .5; 2.5 SOLUTION RESPIRATORY (INHALATION) at 20:33

## 2023-09-10 RX ADMIN — POLYETHYLENE GLYCOL 3350 17 GRAM ORAL POWDER PACKET 17 G: at 09:13

## 2023-09-10 RX ADMIN — ARFORMOTEROL TARTRATE 15 MCG: 15 SOLUTION RESPIRATORY (INHALATION) at 08:02

## 2023-09-10 RX ADMIN — IPRATROPIUM BROMIDE AND ALBUTEROL SULFATE 1 DOSE: .5; 2.5 SOLUTION RESPIRATORY (INHALATION) at 16:39

## 2023-09-10 RX ADMIN — LEVETIRACETAM 500 MG: 100 INJECTION INTRAVENOUS at 09:14

## 2023-09-10 RX ADMIN — ACETAMINOPHEN 650 MG: 325 TABLET ORAL at 15:12

## 2023-09-10 ASSESSMENT — PAIN SCALES - GENERAL
PAINLEVEL_OUTOF10: 6
PAINLEVEL_OUTOF10: 0
PAINLEVEL_OUTOF10: 4

## 2023-09-10 ASSESSMENT — PAIN DESCRIPTION - DESCRIPTORS: DESCRIPTORS: ACHING

## 2023-09-10 ASSESSMENT — PAIN DESCRIPTION - LOCATION
LOCATION: BACK;CHEST
LOCATION: GENERALIZED

## 2023-09-10 NOTE — PROGRESS NOTES
Units 09/08/23 14:53   Source:  Blood Arterial   pH, Blood Gas 7.350 - 7.450  7. 381   PCO2 35.0 - 45.0 mmHg 45.2 (H)   pO2 75.0 - 100.0 mmHg 72.0 (L)   HCO3 22.0 - 26.0 mmol/L 26.2 (H)   Base Excess -3.0 - 3.0 mmol/L 0.9   O2 Sat 92.0 - 98.5 % 93.2   THB,THB 11.5 - 16.5 g/dL 9.7 (L)   O2Hb 94.0 - 97.0 % 92.2 (L)   Carboxy-Hgb 0.0 - 1.5 % 0.5   METHB,METHB 0.0 - 1.5 % 0.6   HHb 0.0 - 5.0 % 6.7 (H)   O2 Content mL/dL 12.7   Pt Temp C 37.0   Critical(s) Notified  . No Critical Values   Time Analyzed  1453   Mode  HFNC 9  L   (H): Data is abnormally high  (L): Data is abnormally low     Assessment:    PEA Cardiac arrest  Anoxic encephalopathy-improving  Acute on chronic hypoxic and hypercapnic respiratory failure, intubated 8/19-8/20  Paroxysmal atrial fibrillation-on amio and eliquis  Severe pulmonary hypertension-pre and postcapillary with borderline preserved cardiac index. Negative nitric oxide response. 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  uses between 5-6 L at home   NIV in the form of AVAPS for respiratory support-volume 400, EPAP +8, FiO2 50%, BUR 16-at HS and PRN  Repeat ABG 9/8/2023-7.38/45.2/72/26.2/0.9/93.2  Chest x-ray 9/8/23-reviewed  Scheduled bronchodilators-brovana and pulmicort BID with DuoNebs q4h while awake and albuterol prn  Chronic Prednisone dose 5 mg daily (due to adrenal insufficiency)  CT head no acute intracranial abnormality on 9/6/2023  Prior plan recommended by [facility] in June 2023 after right heart catheterization--Continue with ongoing optimization of LHD/HFpEF with current diuretic regimen. Educated on low sodium diet. Gentle diuresis. BP control. Ongoing symptoms of dyspnea due to end-stage diastolic heart failure. Difficult situation due to treatment for Central DI -management per nephrology, IV fluids stopped. Attempt  diuresis if able.   Cautious use of fluids with diastolic heart failure and severe pulmonary hypertension  Recommend LTAC referral-denied, discharge plan is [place] study 9/1/23, pureed solids with honey thick liquids per speech    Electronically signed by FRIDA Gtz CNP on 9/10/2023 at 11:13 AM

## 2023-09-10 NOTE — PLAN OF CARE
Problem: Chronic Conditions and Co-morbidities  Goal: Patient's chronic conditions and co-morbidity symptoms are monitored and maintained or improved  Outcome: Progressing  Flowsheets (Taken 9/10/2023 0930)  Care Plan - Patient's Chronic Conditions and Co-Morbidity Symptoms are Monitored and Maintained or Improved: Collaborate with multidisciplinary team to address chronic and comorbid conditions and prevent exacerbation or deterioration     Problem: Discharge Planning  Goal: Discharge to home or other facility with appropriate resources  Outcome: Progressing  Flowsheets (Taken 9/10/2023 0930)  Discharge to home or other facility with appropriate resources: Arrange for needed discharge resources and transportation as appropriate     Problem: Safety - Adult  Goal: Free from fall injury  Outcome: Progressing  Flowsheets (Taken 9/10/2023 0930)  Free From Fall Injury: Instruct family/caregiver on patient safety     Problem: Pain  Goal: Verbalizes/displays adequate comfort level or baseline comfort level  Outcome: Progressing     Problem: Skin/Tissue Integrity  Goal: Absence of new skin breakdown  Description: 1. Monitor for areas of redness and/or skin breakdown  2. Assess vascular access sites hourly  3. Every 4-6 hours minimum:  Change oxygen saturation probe site  4. Every 4-6 hours:  If on nasal continuous positive airway pressure, respiratory therapy assess nares and determine need for appliance change or resting period.   Outcome: Progressing     Problem: ABCDS Injury Assessment  Goal: Absence of physical injury  Outcome: Progressing  Flowsheets (Taken 9/10/2023 0930)  Absence of Physical Injury: Implement safety measures based on patient assessment     Problem: Neurosensory - Adult  Goal: Achieves stable or improved neurological status  Outcome: Progressing  Flowsheets (Taken 9/10/2023 0930)  Achieves stable or improved neurological status: Assess for and report changes in neurological status  Goal: Absence document risk factors for pressure ulcer development  Goal: Oral mucous membranes remain intact  Outcome: Progressing  Flowsheets (Taken 9/10/2023 0930)  Oral Mucous Membranes Remain Intact: Assess oral mucosa and hygiene practices     Problem: Nutrition Deficit:  Goal: Optimize nutritional status  Outcome: Progressing

## 2023-09-10 NOTE — PROGRESS NOTES
[facility] Inpatient   Resident Progress Note    S:  Hospital day: 22    Brief Synopsis: Sherrie Calloway is a 78 y.o. female with a past medical history of pulmonary sarcoidosis, atrial fibrillation on Eliquis, pulmonary hypertension, diabetes insipidus, hypothyroidism, hypertension, chronic kidney disease stage IIIa and combined systolic/diastolic heart failure who presents to the emergency department for shortness of breath. Patient was recently discharged from [facility] (08/18/23) due to acute hypoxic respiratory failure. Patient has had numerous hospital admissions for the same complaint. Hypotensive, tachycardic and hypoxic at presentation. She did decompensate and ended up in PEA. 2 rounds of CPR were performed and ROSC achieved. Patient admitted to the intensive care unit for acute hypoxic respiratory failure requiring intubation and vasopressors. She was extubated and on Bipap 08/29. She was denied LTAC at [facility] due to subactue compression fracture seen on 08/19 on CT and insufficient nutritional status as she was NPO due to BiPaP. She remained on BiPap and was slowly weaned to [facility] which she tolerated well. She was reevaluated with a swallow eval and able to be on puree diet. Neurosurgery consulted for the compression fracture which was stable and consulted PT/OT. Patient constantly complained of pain in her chest which is due to chest compressions she received on admission. She is being given tylenol, lidocaine patches and occasional one time pain relief for the pain; being cautious not to  impair her respiratory drive. Also managed with hydroxyzine PRN for anxiety. Social work re-evaluated her for FRITZ and will be going to [employer] on discharge. Sepsis work up was done on 09/06 as patient was not looking well enough to be discharged to [address] [place] [address]. ABG showed poor oxygenation PO2 of 58 and poor O2 saturation SaO2 90%.  Also urine cx was positive for candida tropicalis which she has pulmonary edema or fluid overload   2. Increased bibasilar linear opacities suggest worsening atelectasis. 3. The position and alignment of the tubes and catheters appear within the   normal range         XR CHEST PORTABLE   Final Result   Cardiomediastinal silhouette appears enlarged. Prominent hilar/perihilar structures probably reflecting prominent pulmonary   vessels however cannot entirely exclude nodule/mass. Would advise a   follow-up PA chest radiograph. Mild bibasilar atelectasis or infiltrate. No gross pneumothorax. Deformity of the right humeral head. XR CHEST PORTABLE    (Results Pending)       A/P:  Principal Problem:    Cardiac arrest ([address])  Active Problems:    Altered mental status    Pulmonary hypertension    Pneumonia due to infectious organism    Moderate protein-calorie malnutrition (HCC)    Shortness of breath    Palliative care encounter    Palliative care by specialist  Resolved Problems:    * No resolved hospital problems. *      Acute hypoxic respiratory failure  Status post cardiac arrest  Left lower lobe pneumonia  Severe pulmonary hypertension  Pulmonary sarcoidosis  History of moderate COPD, stage II by gold classification on 5 to 6 L at home  On 6 L HFNC, wean as tolerated. On 5-6L at home   ProBNP  downtrending 9,191pg/ml 09/07  Continue solumedrol 40 mg IV every 8 hours  Continue Toprol XL and hydralazine. Continue home medication Prednisone for Sarcoid  Bipap 2 hrs on and 4 hrs off and at night; O2 9L    Chronic kidney disease  Hyponatremia and hypochloremia  History of diabetes insipidus  Nephro on board: discontinue Kcl 20 in D5 40 cc/h; hydralazine on hold  Monitor intake/output   Nephro and pulm deciding risk and benefit of diuresing patient at the time.     Constipation - improved   Continue polyethylene glycol and senna  CT abdomen/pelvis was unremarkable for any mechanical obstruction 32/91/80  Stable umbilical hernia; Gen surg consulted with

## 2023-09-11 ENCOUNTER — APPOINTMENT (OUTPATIENT)
Dept: GENERAL RADIOLOGY | Age: 68
DRG: 870 | End: 2023-09-11
Payer: MEDICARE

## 2023-09-11 LAB
ANION GAP SERPL CALCULATED.3IONS-SCNC: 10 MMOL/L (ref 7–16)
BUN SERPL-MCNC: 12 MG/DL (ref 6–23)
CALCIUM SERPL-MCNC: 8.6 MG/DL (ref 8.6–10.2)
CHLORIDE SERPL-SCNC: 109 MMOL/L (ref 98–107)
CO2 SERPL-SCNC: 25 MMOL/L (ref 22–29)
CREAT SERPL-MCNC: 1.1 MG/DL (ref 0.5–1)
ERYTHROCYTE [DISTWIDTH] IN BLOOD BY AUTOMATED COUNT: 18.1 % (ref 11.5–15)
GFR SERPL CREATININE-BSD FRML MDRD: 58 ML/MIN/1.73M2
GLUCOSE SERPL-MCNC: 76 MG/DL (ref 74–99)
HCT VFR BLD AUTO: 25.9 % (ref 34–48)
HGB BLD-MCNC: 7.8 G/DL (ref 11.5–15.5)
MCH RBC QN AUTO: 25.8 PG (ref 26–35)
MCHC RBC AUTO-ENTMCNC: 30.1 G/DL (ref 32–34.5)
MCV RBC AUTO: 85.8 FL (ref 80–99.9)
MICROORGANISM SPEC CULT: NORMAL
MICROORGANISM SPEC CULT: NORMAL
PLATELET, FLUORESCENCE: 135 K/UL (ref 130–450)
PMV BLD AUTO: 10.9 FL (ref 7–12)
POTASSIUM SERPL-SCNC: 3.7 MMOL/L (ref 3.5–5)
RBC # BLD AUTO: 3.02 M/UL (ref 3.5–5.5)
SERVICE CMNT-IMP: NORMAL
SERVICE CMNT-IMP: NORMAL
SODIUM SERPL-SCNC: 144 MMOL/L (ref 132–146)
SPECIMEN DESCRIPTION: NORMAL
SPECIMEN DESCRIPTION: NORMAL
WBC OTHER # BLD: 4.7 K/UL (ref 4.5–11.5)

## 2023-09-11 PROCEDURE — A4216 STERILE WATER/SALINE, 10 ML: HCPCS

## 2023-09-11 PROCEDURE — 80048 BASIC METABOLIC PNL TOTAL CA: CPT

## 2023-09-11 PROCEDURE — 71045 X-RAY EXAM CHEST 1 VIEW: CPT

## 2023-09-11 PROCEDURE — 2580000003 HC RX 258

## 2023-09-11 PROCEDURE — 2700000000 HC OXYGEN THERAPY PER DAY

## 2023-09-11 PROCEDURE — 94660 CPAP INITIATION&MGMT: CPT

## 2023-09-11 PROCEDURE — 6370000000 HC RX 637 (ALT 250 FOR IP): Performed by: INTERNAL MEDICINE

## 2023-09-11 PROCEDURE — 94640 AIRWAY INHALATION TREATMENT: CPT

## 2023-09-11 PROCEDURE — 6370000000 HC RX 637 (ALT 250 FOR IP)

## 2023-09-11 PROCEDURE — 85027 COMPLETE CBC AUTOMATED: CPT

## 2023-09-11 PROCEDURE — 6360000002 HC RX W HCPCS: Performed by: INTERNAL MEDICINE

## 2023-09-11 PROCEDURE — 6360000002 HC RX W HCPCS

## 2023-09-11 PROCEDURE — 99232 SBSQ HOSP IP/OBS MODERATE 35: CPT | Performed by: FAMILY MEDICINE

## 2023-09-11 PROCEDURE — 2060000000 HC ICU INTERMEDIATE R&B

## 2023-09-11 PROCEDURE — 92526 ORAL FUNCTION THERAPY: CPT

## 2023-09-11 PROCEDURE — C9113 INJ PANTOPRAZOLE SODIUM, VIA: HCPCS

## 2023-09-11 RX ORDER — DESMOPRESSIN ACETATE 0.1 MG/1
200 TABLET ORAL NIGHTLY
Status: DISCONTINUED | OUTPATIENT
Start: 2023-09-11 | End: 2023-09-12

## 2023-09-11 RX ADMIN — AMIODARONE HYDROCHLORIDE 200 MG: 200 TABLET ORAL at 10:07

## 2023-09-11 RX ADMIN — SODIUM CHLORIDE, PRESERVATIVE FREE 40 MG: 5 INJECTION INTRAVENOUS at 10:07

## 2023-09-11 RX ADMIN — HEPARIN 300 UNITS: 100 SYRINGE at 02:45

## 2023-09-11 RX ADMIN — ARFORMOTEROL TARTRATE 15 MCG: 15 SOLUTION RESPIRATORY (INHALATION) at 09:08

## 2023-09-11 RX ADMIN — BUDESONIDE INHALATION 500 MCG: 0.5 SUSPENSION RESPIRATORY (INHALATION) at 09:07

## 2023-09-11 RX ADMIN — BUDESONIDE INHALATION 500 MCG: 0.5 SUSPENSION RESPIRATORY (INHALATION) at 21:13

## 2023-09-11 RX ADMIN — METOPROLOL TARTRATE 25 MG: 25 TABLET, FILM COATED ORAL at 10:06

## 2023-09-11 RX ADMIN — DESMOPRESSIN ACETATE 200 MCG: 0.1 TABLET ORAL at 22:47

## 2023-09-11 RX ADMIN — HEPARIN 300 UNITS: 100 SYRINGE at 10:05

## 2023-09-11 RX ADMIN — METOPROLOL TARTRATE 25 MG: 25 TABLET, FILM COATED ORAL at 22:47

## 2023-09-11 RX ADMIN — ARFORMOTEROL TARTRATE 15 MCG: 15 SOLUTION RESPIRATORY (INHALATION) at 21:13

## 2023-09-11 RX ADMIN — HYDROXYZINE HYDROCHLORIDE 10 MG: 10 TABLET ORAL at 13:52

## 2023-09-11 RX ADMIN — Medication 10 ML: at 10:05

## 2023-09-11 RX ADMIN — PREDNISONE 5 MG: 5 TABLET ORAL at 10:07

## 2023-09-11 RX ADMIN — IPRATROPIUM BROMIDE AND ALBUTEROL SULFATE 1 DOSE: .5; 2.5 SOLUTION RESPIRATORY (INHALATION) at 21:13

## 2023-09-11 RX ADMIN — Medication 10 ML: at 22:45

## 2023-09-11 RX ADMIN — POLYETHYLENE GLYCOL 3350 17 GRAM ORAL POWDER PACKET 17 G: at 10:06

## 2023-09-11 RX ADMIN — APIXABAN 5 MG: 5 TABLET, FILM COATED ORAL at 22:47

## 2023-09-11 RX ADMIN — IPRATROPIUM BROMIDE AND ALBUTEROL SULFATE 1 DOSE: .5; 2.5 SOLUTION RESPIRATORY (INHALATION) at 16:49

## 2023-09-11 RX ADMIN — LEVOTHYROXINE SODIUM 75 MCG: 0.07 TABLET ORAL at 06:04

## 2023-09-11 RX ADMIN — OXYCODONE HYDROCHLORIDE 2.5 MG: 5 TABLET ORAL at 03:12

## 2023-09-11 RX ADMIN — IPRATROPIUM BROMIDE AND ALBUTEROL SULFATE 1 DOSE: .5; 2.5 SOLUTION RESPIRATORY (INHALATION) at 12:20

## 2023-09-11 RX ADMIN — LEVETIRACETAM 500 MG: 100 INJECTION INTRAVENOUS at 10:05

## 2023-09-11 RX ADMIN — HYDROXYZINE HYDROCHLORIDE 10 MG: 10 TABLET ORAL at 06:25

## 2023-09-11 RX ADMIN — HEPARIN 300 UNITS: 100 SYRINGE at 22:44

## 2023-09-11 RX ADMIN — Medication 10 ML: at 22:53

## 2023-09-11 RX ADMIN — IPRATROPIUM BROMIDE AND ALBUTEROL SULFATE 1 DOSE: .5; 2.5 SOLUTION RESPIRATORY (INHALATION) at 09:07

## 2023-09-11 RX ADMIN — FLUCONAZOLE 150 MG: 150 TABLET ORAL at 13:52

## 2023-09-11 RX ADMIN — STANDARDIZED SENNA CONCENTRATE 8.6 MG: 8.6 TABLET ORAL at 22:47

## 2023-09-11 RX ADMIN — AMLODIPINE BESYLATE 5 MG: 5 TABLET ORAL at 10:06

## 2023-09-11 RX ADMIN — APIXABAN 5 MG: 5 TABLET, FILM COATED ORAL at 10:06

## 2023-09-11 RX ADMIN — OXYCODONE HYDROCHLORIDE 2.5 MG: 5 TABLET ORAL at 10:07

## 2023-09-11 RX ADMIN — LEVETIRACETAM 500 MG: 100 INJECTION INTRAVENOUS at 22:45

## 2023-09-11 ASSESSMENT — PAIN DESCRIPTION - ORIENTATION
ORIENTATION: MID
ORIENTATION: MID;RIGHT
ORIENTATION: MID;RIGHT

## 2023-09-11 ASSESSMENT — PAIN SCALES - GENERAL
PAINLEVEL_OUTOF10: 8
PAINLEVEL_OUTOF10: 0
PAINLEVEL_OUTOF10: 0
PAINLEVEL_OUTOF10: 7
PAINLEVEL_OUTOF10: 7
PAINLEVEL_OUTOF10: 5

## 2023-09-11 ASSESSMENT — PAIN DESCRIPTION - DESCRIPTORS
DESCRIPTORS: ACHING;DISCOMFORT;SORE
DESCRIPTORS: ACHING;DISCOMFORT;SORE
DESCRIPTORS: ACHING;CRAMPING;DISCOMFORT

## 2023-09-11 ASSESSMENT — PAIN DESCRIPTION - LOCATION
LOCATION: CHEST
LOCATION: CHEST
LOCATION: COCCYX

## 2023-09-11 ASSESSMENT — PAIN DESCRIPTION - ONSET
ONSET: ON-GOING
ONSET: ON-GOING

## 2023-09-11 ASSESSMENT — PAIN DESCRIPTION - FREQUENCY
FREQUENCY: CONTINUOUS
FREQUENCY: CONTINUOUS

## 2023-09-11 ASSESSMENT — PAIN DESCRIPTION - PAIN TYPE
TYPE: ACUTE PAIN
TYPE: ACUTE PAIN

## 2023-09-11 NOTE — CARE COORDINATION
Discharge to [facility] held on Friday due to Central DI requiring IV fluids and HF and pulmonary htn requiring diuresis. I asked [employer] to please check on appeal that was still pending on Friday. [facility] following but will not ask them to restart precert until clear from pulm and nephro perspective. [facility] and ambulance on soft chart. Patient on 4L oxygen, and NIV support continues. For questions I can be reached at [phone].  Quentin Corado, [facility]

## 2023-09-11 NOTE — PROGRESS NOTES
[facility] Inpatient   Resident Progress Note    S:  Hospital day: 21    Brief Synopsis: Royal Silvestre is a 78 y.o. female with a past medical history of pulmonary sarcoidosis, atrial fibrillation on Eliquis, pulmonary hypertension, diabetes insipidus, hypothyroidism, hypertension, chronic kidney disease stage IIIa and combined systolic/diastolic heart failure who presents to the emergency department for shortness of breath. Patient was recently discharged from [facility] (08/18/23) due to acute hypoxic respiratory failure. Patient has had numerous hospital admissions for the same complaint. Hypotensive, tachycardic and hypoxic at presentation. She did decompensate and ended up in PEA. 2 rounds of CPR were performed and ROSC achieved. Patient admitted to the intensive care unit for acute hypoxic respiratory failure requiring intubation and vasopressors. She was extubated and on Bipap 08/29. She was denied LTAC at [facility] due to subactue compression fracture seen on 08/19 on CT and insufficient nutritional status as she was NPO due to BiPaP. She remained on BiPap and was slowly weaned to [facility] which she tolerated well. She was reevaluated with a swallow eval and able to be on puree diet. Neurosurgery consulted for the compression fracture which was stable and consulted PT/OT. Patient constantly complained of pain in her chest which is due to chest compressions she received on admission. She is being given tylenol, lidocaine patches and occasional one time pain relief for the pain; being cautious not to  impair her respiratory drive. Also managed with hydroxyzine PRN for anxiety. Social work re-evaluated her for FRITZ and will be going to [employer] on discharge. Sepsis work up was done on 09/06 as patient was not looking well enough to be discharged to [address] [place] [address]. ABG showed poor oxygenation PO2 of 58 and poor O2 saturation SaO2 90%.  Also urine cx was positive for candida tropicalis which she has

## 2023-09-12 ENCOUNTER — APPOINTMENT (OUTPATIENT)
Dept: GENERAL RADIOLOGY | Age: 68
DRG: 870 | End: 2023-09-12
Payer: MEDICARE

## 2023-09-12 LAB
ANION GAP SERPL CALCULATED.3IONS-SCNC: 5 MMOL/L (ref 7–16)
ANION GAP SERPL CALCULATED.3IONS-SCNC: 8 MMOL/L (ref 7–16)
BUN SERPL-MCNC: 13 MG/DL (ref 6–23)
BUN SERPL-MCNC: 14 MG/DL (ref 6–23)
CALCIUM SERPL-MCNC: 8.6 MG/DL (ref 8.6–10.2)
CALCIUM SERPL-MCNC: 9.1 MG/DL (ref 8.6–10.2)
CHLORIDE SERPL-SCNC: 107 MMOL/L (ref 98–107)
CHLORIDE SERPL-SCNC: 110 MMOL/L (ref 98–107)
CO2 SERPL-SCNC: 30 MMOL/L (ref 22–29)
CO2 SERPL-SCNC: 31 MMOL/L (ref 22–29)
CREAT SERPL-MCNC: 0.9 MG/DL (ref 0.5–1)
CREAT SERPL-MCNC: 1 MG/DL (ref 0.5–1)
EKG ATRIAL RATE: 60 BPM
EKG P AXIS: 35 DEGREES
EKG P-R INTERVAL: 158 MS
EKG Q-T INTERVAL: 464 MS
EKG QRS DURATION: 86 MS
EKG QTC CALCULATION (BAZETT): 464 MS
EKG R AXIS: 94 DEGREES
EKG T AXIS: 124 DEGREES
EKG VENTRICULAR RATE: 60 BPM
ERYTHROCYTE [DISTWIDTH] IN BLOOD BY AUTOMATED COUNT: 18.2 % (ref 11.5–15)
GFR SERPL CREATININE-BSD FRML MDRD: >60 ML/MIN/1.73M2
GFR SERPL CREATININE-BSD FRML MDRD: >60 ML/MIN/1.73M2
GLUCOSE BLD-MCNC: 89 MG/DL (ref 74–99)
GLUCOSE SERPL-MCNC: 171 MG/DL (ref 74–99)
GLUCOSE SERPL-MCNC: 69 MG/DL (ref 74–99)
HCT VFR BLD AUTO: 26 % (ref 34–48)
HGB BLD-MCNC: 7.9 G/DL (ref 11.5–15.5)
MCH RBC QN AUTO: 26.3 PG (ref 26–35)
MCHC RBC AUTO-ENTMCNC: 30.4 G/DL (ref 32–34.5)
MCV RBC AUTO: 86.7 FL (ref 80–99.9)
PLATELET # BLD AUTO: 149 K/UL (ref 130–450)
PMV BLD AUTO: 12.2 FL (ref 7–12)
POTASSIUM SERPL-SCNC: 3.4 MMOL/L (ref 3.5–5)
POTASSIUM SERPL-SCNC: 3.7 MMOL/L (ref 3.5–5)
RBC # BLD AUTO: 3 M/UL (ref 3.5–5.5)
SODIUM SERPL-SCNC: 143 MMOL/L (ref 132–146)
SODIUM SERPL-SCNC: 148 MMOL/L (ref 132–146)
WBC OTHER # BLD: 4.6 K/UL (ref 4.5–11.5)

## 2023-09-12 PROCEDURE — 6370000000 HC RX 637 (ALT 250 FOR IP)

## 2023-09-12 PROCEDURE — 2700000000 HC OXYGEN THERAPY PER DAY

## 2023-09-12 PROCEDURE — 2580000003 HC RX 258

## 2023-09-12 PROCEDURE — 71045 X-RAY EXAM CHEST 1 VIEW: CPT

## 2023-09-12 PROCEDURE — 92526 ORAL FUNCTION THERAPY: CPT

## 2023-09-12 PROCEDURE — 99232 SBSQ HOSP IP/OBS MODERATE 35: CPT | Performed by: FAMILY MEDICINE

## 2023-09-12 PROCEDURE — 80048 BASIC METABOLIC PNL TOTAL CA: CPT

## 2023-09-12 PROCEDURE — C9113 INJ PANTOPRAZOLE SODIUM, VIA: HCPCS

## 2023-09-12 PROCEDURE — 97530 THERAPEUTIC ACTIVITIES: CPT

## 2023-09-12 PROCEDURE — 6370000000 HC RX 637 (ALT 250 FOR IP): Performed by: INTERNAL MEDICINE

## 2023-09-12 PROCEDURE — 2060000000 HC ICU INTERMEDIATE R&B

## 2023-09-12 PROCEDURE — 6360000002 HC RX W HCPCS: Performed by: INTERNAL MEDICINE

## 2023-09-12 PROCEDURE — 6360000002 HC RX W HCPCS

## 2023-09-12 PROCEDURE — 97535 SELF CARE MNGMENT TRAINING: CPT

## 2023-09-12 PROCEDURE — 82962 GLUCOSE BLOOD TEST: CPT

## 2023-09-12 PROCEDURE — A4216 STERILE WATER/SALINE, 10 ML: HCPCS

## 2023-09-12 PROCEDURE — 85027 COMPLETE CBC AUTOMATED: CPT

## 2023-09-12 PROCEDURE — 94660 CPAP INITIATION&MGMT: CPT

## 2023-09-12 PROCEDURE — 94640 AIRWAY INHALATION TREATMENT: CPT

## 2023-09-12 RX ORDER — DESMOPRESSIN ACETATE 0.1 MG/1
200 TABLET ORAL 2 TIMES DAILY
Status: DISCONTINUED | OUTPATIENT
Start: 2023-09-12 | End: 2023-09-13 | Stop reason: HOSPADM

## 2023-09-12 RX ORDER — DEXTROSE MONOHYDRATE 50 MG/ML
INJECTION, SOLUTION INTRAVENOUS CONTINUOUS
Status: ACTIVE | OUTPATIENT
Start: 2023-09-12 | End: 2023-09-12

## 2023-09-12 RX ORDER — POTASSIUM CHLORIDE 20 MEQ/1
40 TABLET, EXTENDED RELEASE ORAL ONCE
Status: COMPLETED | OUTPATIENT
Start: 2023-09-12 | End: 2023-09-12

## 2023-09-12 RX ORDER — GUAIFENESIN/DEXTROMETHORPHAN 100-10MG/5
5 SYRUP ORAL EVERY 4 HOURS PRN
Status: DISCONTINUED | OUTPATIENT
Start: 2023-09-12 | End: 2023-09-12

## 2023-09-12 RX ORDER — GUAIFENESIN 400 MG/1
400 TABLET ORAL EVERY 8 HOURS
Status: DISCONTINUED | OUTPATIENT
Start: 2023-09-12 | End: 2023-09-13 | Stop reason: HOSPADM

## 2023-09-12 RX ADMIN — STANDARDIZED SENNA CONCENTRATE 8.6 MG: 8.6 TABLET ORAL at 21:27

## 2023-09-12 RX ADMIN — BUDESONIDE INHALATION 500 MCG: 0.5 SUSPENSION RESPIRATORY (INHALATION) at 09:58

## 2023-09-12 RX ADMIN — HEPARIN 300 UNITS: 100 SYRINGE at 21:26

## 2023-09-12 RX ADMIN — LEVETIRACETAM 500 MG: 100 INJECTION INTRAVENOUS at 08:49

## 2023-09-12 RX ADMIN — ARFORMOTEROL TARTRATE 15 MCG: 15 SOLUTION RESPIRATORY (INHALATION) at 20:19

## 2023-09-12 RX ADMIN — SODIUM CHLORIDE, PRESERVATIVE FREE 40 MG: 5 INJECTION INTRAVENOUS at 08:49

## 2023-09-12 RX ADMIN — GUAIFENESIN 400 MG: 400 TABLET ORAL at 12:25

## 2023-09-12 RX ADMIN — POTASSIUM CHLORIDE 40 MEQ: 1500 TABLET, EXTENDED RELEASE ORAL at 12:25

## 2023-09-12 RX ADMIN — Medication 10 ML: at 21:26

## 2023-09-12 RX ADMIN — OXYCODONE HYDROCHLORIDE 2.5 MG: 5 TABLET ORAL at 08:51

## 2023-09-12 RX ADMIN — IPRATROPIUM BROMIDE AND ALBUTEROL SULFATE 1 DOSE: .5; 2.5 SOLUTION RESPIRATORY (INHALATION) at 16:46

## 2023-09-12 RX ADMIN — AMIODARONE HYDROCHLORIDE 200 MG: 200 TABLET ORAL at 08:49

## 2023-09-12 RX ADMIN — GUAIFENESIN 400 MG: 400 TABLET ORAL at 16:52

## 2023-09-12 RX ADMIN — Medication 10 ML: at 21:32

## 2023-09-12 RX ADMIN — HYDROXYZINE HYDROCHLORIDE 10 MG: 10 TABLET ORAL at 14:36

## 2023-09-12 RX ADMIN — DEXTROSE MONOHYDRATE: 50 INJECTION, SOLUTION INTRAVENOUS at 12:32

## 2023-09-12 RX ADMIN — LEVETIRACETAM 500 MG: 100 INJECTION INTRAVENOUS at 21:26

## 2023-09-12 RX ADMIN — LEVOTHYROXINE SODIUM 75 MCG: 0.07 TABLET ORAL at 08:50

## 2023-09-12 RX ADMIN — OXYCODONE HYDROCHLORIDE 2.5 MG: 5 TABLET ORAL at 18:57

## 2023-09-12 RX ADMIN — METOPROLOL TARTRATE 25 MG: 25 TABLET, FILM COATED ORAL at 21:31

## 2023-09-12 RX ADMIN — Medication 10 ML: at 08:51

## 2023-09-12 RX ADMIN — HEPARIN 300 UNITS: 100 SYRINGE at 08:50

## 2023-09-12 RX ADMIN — APIXABAN 5 MG: 5 TABLET, FILM COATED ORAL at 21:27

## 2023-09-12 RX ADMIN — PREDNISONE 5 MG: 5 TABLET ORAL at 08:50

## 2023-09-12 RX ADMIN — AMLODIPINE BESYLATE 5 MG: 5 TABLET ORAL at 08:49

## 2023-09-12 RX ADMIN — IPRATROPIUM BROMIDE AND ALBUTEROL SULFATE 1 DOSE: .5; 2.5 SOLUTION RESPIRATORY (INHALATION) at 12:57

## 2023-09-12 RX ADMIN — IPRATROPIUM BROMIDE AND ALBUTEROL SULFATE 1 DOSE: .5; 2.5 SOLUTION RESPIRATORY (INHALATION) at 09:58

## 2023-09-12 RX ADMIN — BUDESONIDE INHALATION 500 MCG: 0.5 SUSPENSION RESPIRATORY (INHALATION) at 20:19

## 2023-09-12 RX ADMIN — ARFORMOTEROL TARTRATE 15 MCG: 15 SOLUTION RESPIRATORY (INHALATION) at 09:58

## 2023-09-12 RX ADMIN — IPRATROPIUM BROMIDE AND ALBUTEROL SULFATE 1 DOSE: .5; 2.5 SOLUTION RESPIRATORY (INHALATION) at 20:19

## 2023-09-12 RX ADMIN — APIXABAN 5 MG: 5 TABLET, FILM COATED ORAL at 08:50

## 2023-09-12 RX ADMIN — METOPROLOL TARTRATE 25 MG: 25 TABLET, FILM COATED ORAL at 08:50

## 2023-09-12 RX ADMIN — DESMOPRESSIN ACETATE 200 MCG: 0.1 TABLET ORAL at 12:25

## 2023-09-12 RX ADMIN — DESMOPRESSIN ACETATE 200 MCG: 0.1 TABLET ORAL at 21:27

## 2023-09-12 ASSESSMENT — PAIN DESCRIPTION - LOCATION: LOCATION: BACK

## 2023-09-12 ASSESSMENT — PAIN SCALES - GENERAL
PAINLEVEL_OUTOF10: 0
PAINLEVEL_OUTOF10: 5
PAINLEVEL_OUTOF10: 6
PAINLEVEL_OUTOF10: 0
PAINLEVEL_OUTOF10: 0

## 2023-09-12 NOTE — CARE COORDINATION
Pulm cleared for discharge today from their perspective. Called and requested updated therapy notes this am. Will have [address] for precert if nephro and family medicine feel stable for discharge. Pasrr and ambulance on soft chart. NIV obtained by Nilesh last week for patient. [facility] Patient today, will let me know if approved. 1426 Auth obtained for [place], nephro holding discharge. For questions I can be reached at [phone].  Jannie Camarena, [facility]

## 2023-09-12 NOTE — PROGRESS NOTES
Physical Therapy Treatment    Name: Cathyann Lanes  : 1955  MRN: [id number]      Date of Service: 2023    Evaluating PT:  Rylee Adorno PT, DPT [id number]    Room #:  2949/9863-Y  Diagnosis:  Cardiac arrest ([address]) [I46.9]  Shortness of breath [R06.02]  Pneumonia due to infectious organism, unspecified laterality, unspecified part of lung [J18.9]  PMHx/PSHx:    Past Medical History:   Diagnosis Date    A-fib [facility])     follows with Dr Mi Omer    Abnormal mammogram     Acute on chronic respiratory failure ([address]) 2017    Anemia     Anemia due to chronic illness     Ankle fracture, left 2008    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    Backache     Benign hypertension     CHF (congestive heart failure) ([facility])     Chronic back pain     Chronic kidney disease     stage 3    Chronic pain disorder     Chronic, continuous use of opioids     Compression fracture of thoracic vertebra (HCC)     T10    COPD (chronic obstructive pulmonary disease) ([address])     Debility     Deformity of ankle and foot, acquired 2011    Depression     Diabetes insipidus ([address])     Diverticulitis     Dry eyes     Encephalopathy acute 2017    Gait disturbance     GERD (gastroesophageal reflux disease)     Hemorrhoids 2012    Hernia     History of blood transfusion approx 2017    History of Clostridium difficile     negative culture 12-15-15; resolved    Hyperlipidemia     Hypothyroidism     Ischemic colitis, enteritis, or enterocolitis ([address])     Long term (current) use of systemic steroids 07/10/2022    Long-term current use of steroids     Lumbar disc herniation     Myalgia and myositis, unspecified     Nephrosclerosis     Nonunion of fracture 2011    Osteoarthritis     severe    PAF (paroxysmal atrial fibrillation) ([facility])     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[facility])     PCWP mean:26.0 PA mean: 24.00; denies any recent issues    Pulmonary hypertension ([facility])     ventricular diastolic function consistent with abnormal relaxation (stage I)    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, f/u w/ PCP    Rectal bleeding     Rhabdomyolysis 05/09/2011    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 Previous notes indicate that she lived with her  in a 1 story home with a ramped entry. Bed and bath on first floor. PTA, pt ambulated with [facility] for short distances and w/c in community. Pt is on 6L/min 02 at baseline. OBJECTIVE:   Initial Evaluation  Date: 9/5/23 Treatment Date: 9/12/23 Short Term/ Long Term   Goals   AM-PAC 6 Clicks 4/95 0/69    Was pt agreeable to Eval/treatment? Yes yes    Does pt have pain? Yes, chest pain near sternum. Most likely from compressions. No c/o pain    Bed Mobility  Rolling: Mod A  Supine to sit: Mod A  Sit to supine: mod A  Scooting: Max A Rolling: MaxA  Supine to sit: Max A  Sit to supine: Max A  Scooting:  Max A Rolling: ind  Supine to sit: ind  Sit to supine: ind  Scooting: ind   Transfers Sit to stand: NT  Stand to sit: NT  Stand pivot: NT Sit to stand: MaxA x2 (x2 reps partial)  Stand to sit: MaxAx2  Stand pivot: NT Sit to stand: mod I  Stand to sit: mod I  Stand pivot: mod I   Ambulation    NT NT unable to advance  feet with FWW mod I   Stair negotiation: ascended and descended  NT NT 4 steps with 2 rail mod I   ROM BUE:  Refer to OT eval   BLE:  NT     Strength BUE:  Refer to OT eval   BLE:  3-/5 grossly   4/5   Balance

## 2023-09-12 NOTE — PROGRESS NOTES
[facility] Inpatient   Resident Progress Note    S:  Hospital day: 24    Brief Synopsis: Basilia Alexander is a 78 y.o. female with a past medical history of pulmonary sarcoidosis, atrial fibrillation on Eliquis, pulmonary hypertension, diabetes insipidus, hypothyroidism, hypertension, chronic kidney disease stage IIIa and combined systolic/diastolic heart failure who presents to the emergency department for shortness of breath. Patient was recently discharged from [facility] (08/18/23) due to acute hypoxic respiratory failure. Patient has had numerous hospital admissions for the same complaint. Hypotensive, tachycardic and hypoxic at presentation. She did decompensate and ended up in PEA. 2 rounds of CPR were performed and ROSC achieved. Patient admitted to the intensive care unit for acute hypoxic respiratory failure requiring intubation and vasopressors. She was extubated and on Bipap 08/29. She was denied LTAC at [facility] due to subactue compression fracture seen on 08/19 on CT and insufficient nutritional status as she was NPO due to BiPaP. She remained on BiPap and was slowly weaned to [facility] which she tolerated well. She was reevaluated with a swallow eval and able to be on puree diet. Neurosurgery consulted for the compression fracture which was stable and consulted PT/OT. Patient constantly complained of pain in her chest which is due to chest compressions she received on admission. She is being given tylenol, lidocaine patches and occasional one time pain relief for the pain; being cautious not to  impair her respiratory drive. Also managed with hydroxyzine PRN for anxiety. Social work re-evaluated her for [facility] and will be going to [facility] on discharge. Sepsis work up was done on 09/06 as patient was not looking well enough to be discharged to [address] [place] [address]. ABG showed poor oxygenation PO2 of 58 and poor O2 saturation SaO2 90%.  Also urine cx was positive for candida tropicalis which she has interstitial prominence. 2.  Trace bilateral pleural effusions/thickening. Bibasilar subsegmental   atelectasis or scarring. CT HEAD WO CONTRAST   Final Result   No acute intracranial abnormality. No significant change from a previous MRI of the brain 08/23/2023. CT THORACIC SPINE WO CONTRAST   Final Result   1. Multiple subacute partial superior endplate compression fractures from T4   through T12 as described above. No change compared to 08/19/2023         CT LUMBAR SPINE WO CONTRAST   Final Result   1. Significant degenerative change and scoliosis. 2.  No acute fracture      3. Old inferior endplate fracture of L5 and superior endplate fracture of S70      4. Circumferential canal stenosis at L4-5. FL MODIFIED BARIUM SWALLOW W VIDEO   Final Result   Laryngeal penetration of thin and nectar thick liquid barium. No evidence of aspiration. Please see separate speech pathology report for full discussion of findings   and recommendations. XR CHEST PORTABLE   Final Result   No significant change in the appearance of chest.         XR CHEST PORTABLE   Final Result   1. Mild pulmonary venous hypertension and bilateral interstitial prominence. The appearance of the chest is improved compared 08/29/2023.      2.  Stable prominence of the hilar vasculature      3. Left PICC line catheter in good position. XR CHEST PORTABLE   Final Result   1. Interval removal of endotracheal tube and gastric catheters. 2.  Medial left lower lobe subsegmental atelectasis      3. Pulmonary venous hypertension and mild interstitial pulmonary edema,   slightly improved compared to the 08/26/2023. XR CHEST PORTABLE   Final Result   1. The tip of the endotracheal tube is low lying   2. Increased left basilar density concerning for worsening effusion and or   atelectasis   3.  Interstitial prominence concerning for mild pulmonary edema or fluid   overload, disease, stable. CTA CHEST W CONTRAST   Final Result   1. Left perihilar and left lower lobe pneumonia. 2. Emphysematous changes with superimposed interstitial pulmonary fibrosis   3. Cardiomegaly with ectasia of the pulmonary trunk and pulmonary arteries   which can be seen with pulmonary hypertension   4. Contrast is seen within the right atrium and extends into the inferior   vena cava. These findings can be seen with right heart failure. 5. Satisfactory position of the endotracheal tube   6. Advanced degenerative changes of the spine. 7. There is no pulmonary embolus. CT ABDOMEN PELVIS W IV CONTRAST Additional Contrast? None   Final Result   Addendum (preliminary) 1 of 1   ADDENDUM:   There is a compression fracture of the T12 vertebral body this was present    on   the study of 05/17/2023. While this could be subacute nature, MRI would    be   of increased sensitivity in further characterization         Final   1. Increased bibasilar areas of consolidation and or atelectasis. 2. The cecum is markedly distended and stool-filled raising the possibility   of constipation. As this area appears isolated, a developing cecal volvulus   could give a similar appearance. Short-term follow-up is recommended after   medical therapy. 3. Multilevel degenerative disc disease   4. Compression fractures involving L5 and T12, relatively stable. 5. High position of the nasogastric tube and this may be advanced into the   body 5-6 cm.   6. Prominent umbilical hernia            XR ABDOMEN FOR NG/OG/NE TUBE PLACEMENT   Final Result   1. The tip of the NG tube appears to be 6 cm inferior to the gastroesophageal   junction. The side hole is seen at the level of the gastroesophageal   junction. The NG tube should be advanced by an additional 5-10 cm if   possible. XR CHEST PORTABLE   Final Result   1.  Increased interstitial prominence within the lungs concerning for   developing pulmonary edema

## 2023-09-12 NOTE — PROGRESS NOTES
Day(s): 10  Ventilator ID: [id number]  Equipment Changed: (S) Humidification  Ventilator Initiate: Yes  Ventilator Discontinue: Yes (extubated per order)  Vent Mode: CPAP/PS          CPAP/EPAP: 8 cmH2O     CURRENT MEDS :  Scheduled Meds:   guaiFENesin  400 mg Oral Q8H    desmopressin  200 mcg Oral Nightly    amLODIPine  5 mg Oral Daily    apixaban  5 mg Oral BID    ipratropium 0.5 mg-albuterol 2.5 mg  1 Dose Inhalation Q4H WA RT    lidocaine  1 patch TransDERmal Daily    metoprolol tartrate  25 mg Oral BID    predniSONE  5 mg Oral Daily    levETIRAcetam  500 mg IntraVENous Q12H    [Held by provider] potassium & sodium phosphates  1 packet Per NG tube q8h    [Held by provider] levETIRAcetam  500 mg Oral BID    lidocaine PF  5 mL IntraDERmal Once    sodium chloride flush  5-40 mL IntraVENous 2 times per day    heparin flush  3 mL IntraVENous 2 times per day    amiodarone  200 mg Oral Daily    budesonide  500 mcg Nebulization BID RT    arformoterol tartrate  15 mcg Nebulization BID RT    senna  1 tablet Oral Nightly    polyethylene glycol  17 g Oral Daily    [Held by provider] hydrALAZINE  25 mg Oral BID    levothyroxine  75 mcg Oral Daily    sodium chloride flush  5-40 mL IntraVENous 2 times per day    pantoprazole (PROTONIX) 40 mg in sodium chloride (PF) 0.9 % 10 mL injection  40 mg IntraVENous Daily       Physical Exam:  General Appearance: appears comfortable in no acute distress. HEENT: Normocephalic atraumatic without obvious abnormality   Neck: Lips, mucosa, and tongue normal.  Supple, symmetrical, trachea midline, no adenopathy;thyroid:  no enlargement/tenderness/nodules or JVD. Lung: Breath sounds basilar crackles Respirations   unlabored. Symmetrical expansion. Heart: RRR, normal S1, S2. No MRG  Abdomen: Soft, NT, ND. BS present x 4 quadrants. No bruit or organomegaly. Extremities: Pedal pulses 2+ symmetric b/l. Extremities normal, no cyanosis, clubbing, or edema.    Musculokeletal: No joint swelling, no appropriate. I agree with the above documented exam, problem list and plan of care.    Krysta Curry MD

## 2023-09-12 NOTE — PROGRESS NOTES
Per Dr. Anatoly Storey, NIV is to be worn at night and PRN during the day. NOT 2 hours on and 4 hours off. Baseline O2 at home is 6L.

## 2023-09-12 NOTE — PROGRESS NOTES
[address]      WKRO:[id number]                                                  Patient Name: Ric Sousa  MRN: [id number]  : 1955  Room: A    Evaluating OT: JAELYN Cox, OTR/L  # [id number]    Referring Provider:  Razia Hartmann MD  Specific Provider Orders:  Laban Demarco and Treat\"23    Diagnosis: Cardiac arrest ([address]) [I46.9]  Shortness of breath [R06.02]  Pneumonia due to infectious organism, unspecified laterality, unspecified part of lung [J18.9]    Pt brought to ER w/ SOB, Intubated in ER, Required 2 rounds of CPR, admitted to ICU. Extubated 23. Transferred to Intermediate unit. Required continuous BiPAP for an extended time.  Compression Fractures found on imaging chronic per NS    Pertinent Medical History:  Pt has a past medical history of A-fib ([address]), Abnormal mammogram, Acute on chronic respiratory failure (HCC), Anemia, Anemia due to chronic illness, Ankle fracture, left, 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[address]), Backache, Benign hypertension, CHF (congestive heart failure) ([facility]), Chronic back pain, Chronic kidney disease, Chronic pain disorder, Chronic, continuous use of opioids, Compression fracture of thoracic vertebra (HCC), COPD (chronic obstructive pulmonary disease) ([address]), Debility, Deformity of ankle and foot, acquired, Depression, Diabetes insipidus ([address]), Diverticulitis, Dry eyes, Encephalopathy acute, Gait disturbance, GERD (gastroesophageal reflux disease), Hemorrhoids, Hernia, History of blood transfusion, History of Clostridium difficile, Hyperlipidemia, Hypothyroidism, Ischemic colitis, enteritis, or enterocolitis ([address]), Long term (current) use of systemic steroids, Long-term current use of steroids, Lumbar disc herniation, Myalgia and myositis, unspecified, Nephrosclerosis, Nonunion of fracture, Osteoarthritis, PAF

## 2023-09-13 ENCOUNTER — APPOINTMENT (OUTPATIENT)
Dept: CT IMAGING | Age: 68
DRG: 870 | End: 2023-09-13
Payer: MEDICARE

## 2023-09-13 ENCOUNTER — TELEPHONE (OUTPATIENT)
Dept: OTHER | Facility: CLINIC | Age: 68
End: 2023-09-13

## 2023-09-13 ENCOUNTER — HOSPITAL ENCOUNTER (INPATIENT)
Age: 68
LOS: 4 days | Discharge: ANOTHER HOSPITAL | DRG: 870 | End: 2023-09-17
Attending: EMERGENCY MEDICINE | Admitting: FAMILY MEDICINE
Payer: MEDICARE

## 2023-09-13 ENCOUNTER — APPOINTMENT (OUTPATIENT)
Dept: GENERAL RADIOLOGY | Age: 68
DRG: 870 | End: 2023-09-13
Payer: MEDICARE

## 2023-09-13 VITALS
HEART RATE: 66 BPM | OXYGEN SATURATION: 97 % | BODY MASS INDEX: 28.01 KG/M2 | TEMPERATURE: 97.8 F | RESPIRATION RATE: 18 BRPM | HEIGHT: 62 IN | WEIGHT: 152.2 LBS | SYSTOLIC BLOOD PRESSURE: 142 MMHG | DIASTOLIC BLOOD PRESSURE: 93 MMHG

## 2023-09-13 DIAGNOSIS — I50.9 CONGESTIVE HEART FAILURE, UNSPECIFIED HF CHRONICITY, UNSPECIFIED HEART FAILURE TYPE (HCC): Primary | ICD-10-CM

## 2023-09-13 DIAGNOSIS — R09.02 HYPOXIA: ICD-10-CM

## 2023-09-13 PROBLEM — I46.9 CARDIAC ARREST (HCC): Status: RESOLVED | Noted: 2023-08-19 | Resolved: 2023-09-13

## 2023-09-13 LAB
ALBUMIN SERPL-MCNC: 3.7 G/DL (ref 3.5–5.2)
ALP SERPL-CCNC: 158 U/L (ref 35–104)
ALT SERPL-CCNC: 10 U/L (ref 0–32)
ANION GAP SERPL CALCULATED.3IONS-SCNC: 11 MMOL/L (ref 7–16)
ANION GAP SERPL CALCULATED.3IONS-SCNC: 7 MMOL/L (ref 7–16)
AST SERPL-CCNC: 17 U/L (ref 0–31)
B.E.: 1.8 MMOL/L (ref -3–3)
BASOPHILS # BLD: 0 K/UL (ref 0–0.2)
BASOPHILS NFR BLD: 0 % (ref 0–2)
BILIRUB SERPL-MCNC: 0.4 MG/DL (ref 0–1.2)
BNP SERPL-MCNC: 6271 PG/ML (ref 0–125)
BUN SERPL-MCNC: 12 MG/DL (ref 6–23)
BUN SERPL-MCNC: 15 MG/DL (ref 6–23)
CALCIUM SERPL-MCNC: 8.8 MG/DL (ref 8.6–10.2)
CALCIUM SERPL-MCNC: 8.9 MG/DL (ref 8.6–10.2)
CHLORIDE SERPL-SCNC: 105 MMOL/L (ref 98–107)
CHLORIDE SERPL-SCNC: 107 MMOL/L (ref 98–107)
CO2 SERPL-SCNC: 27 MMOL/L (ref 22–29)
CO2 SERPL-SCNC: 31 MMOL/L (ref 22–29)
COHB: 0.2 % (ref 0–1.5)
CREAT SERPL-MCNC: 0.9 MG/DL (ref 0.5–1)
CREAT SERPL-MCNC: 1 MG/DL (ref 0.5–1)
CRITICAL: ABNORMAL
DATE ANALYZED: ABNORMAL
DATE OF COLLECTION: ABNORMAL
DIGOXIN SERPL-MCNC: 0.6 NG/ML (ref 0.8–2)
EOSINOPHIL # BLD: 0.36 K/UL (ref 0.05–0.5)
EOSINOPHILS RELATIVE PERCENT: 7 % (ref 0–6)
ERYTHROCYTE [DISTWIDTH] IN BLOOD BY AUTOMATED COUNT: 18.4 % (ref 11.5–15)
GFR SERPL CREATININE-BSD FRML MDRD: >60 ML/MIN/1.73M2
GFR SERPL CREATININE-BSD FRML MDRD: >60 ML/MIN/1.73M2
GLUCOSE SERPL-MCNC: 104 MG/DL (ref 74–99)
GLUCOSE SERPL-MCNC: 79 MG/DL (ref 74–99)
HCO3: 26.4 MMOL/L (ref 22–26)
HCT VFR BLD AUTO: 30.1 % (ref 34–48)
HGB BLD-MCNC: 9 G/DL (ref 11.5–15.5)
HHB: 2.6 % (ref 0–5)
LAB: ABNORMAL
LYMPHOCYTES NFR BLD: 0.45 K/UL (ref 1.5–4)
LYMPHOCYTES RELATIVE PERCENT: 9 % (ref 20–42)
Lab: ABNORMAL
MCH RBC QN AUTO: 26 PG (ref 26–35)
MCHC RBC AUTO-ENTMCNC: 29.9 G/DL (ref 32–34.5)
MCV RBC AUTO: 87 FL (ref 80–99.9)
METHB: 0.2 % (ref 0–1.5)
MODE: ABNORMAL
MONOCYTES NFR BLD: 0.32 K/UL (ref 0.1–0.95)
MONOCYTES NFR BLD: 6 % (ref 2–12)
NEUTROPHILS NFR BLD: 78 % (ref 43–80)
NEUTS SEG NFR BLD: 4.07 K/UL (ref 1.8–7.3)
O2 CONTENT: 13.5 ML/DL
O2 SATURATION: 97.4 % (ref 92–98.5)
O2HB: 97 % (ref 94–97)
OPERATOR ID: 7278
PATIENT TEMP: 37 C
PCO2: 41.4 MMHG (ref 35–45)
PH BLOOD GAS: 7.42 (ref 7.35–7.45)
PLATELET # BLD AUTO: 159 K/UL (ref 130–450)
PMV BLD AUTO: 10.2 FL (ref 7–12)
PO2: 104.2 MMHG (ref 75–100)
POTASSIUM SERPL-SCNC: 3.7 MMOL/L (ref 3.5–5)
POTASSIUM SERPL-SCNC: 4.25 MMOL/L (ref 3.5–5)
POTASSIUM SERPL-SCNC: 4.4 MMOL/L (ref 3.5–5)
PROT SERPL-MCNC: 6.5 G/DL (ref 6.4–8.3)
RBC # BLD AUTO: 3.46 M/UL (ref 3.5–5.5)
RBC # BLD: ABNORMAL 10*6/UL
SODIUM SERPL-SCNC: 143 MMOL/L (ref 132–146)
SODIUM SERPL-SCNC: 145 MMOL/L (ref 132–146)
SOURCE, BLOOD GAS: ABNORMAL
THB: 9.8 G/DL (ref 11.5–16.5)
TIME ANALYZED: 2205
TROPONIN I SERPL HS-MCNC: 28 NG/L (ref 0–9)
TROPONIN I SERPL HS-MCNC: 31 NG/L (ref 0–9)
WBC OTHER # BLD: 5.2 K/UL (ref 4.5–11.5)

## 2023-09-13 PROCEDURE — 80048 BASIC METABOLIC PNL TOTAL CA: CPT

## 2023-09-13 PROCEDURE — 71045 X-RAY EXAM CHEST 1 VIEW: CPT

## 2023-09-13 PROCEDURE — A4216 STERILE WATER/SALINE, 10 ML: HCPCS

## 2023-09-13 PROCEDURE — 96374 THER/PROPH/DIAG INJ IV PUSH: CPT

## 2023-09-13 PROCEDURE — 6360000002 HC RX W HCPCS: Performed by: INTERNAL MEDICINE

## 2023-09-13 PROCEDURE — 6360000004 HC RX CONTRAST MEDICATION: Performed by: RADIOLOGY

## 2023-09-13 PROCEDURE — 36600 WITHDRAWAL OF ARTERIAL BLOOD: CPT

## 2023-09-13 PROCEDURE — 36592 COLLECT BLOOD FROM PICC: CPT

## 2023-09-13 PROCEDURE — 2580000003 HC RX 258

## 2023-09-13 PROCEDURE — 6370000000 HC RX 637 (ALT 250 FOR IP)

## 2023-09-13 PROCEDURE — 93005 ELECTROCARDIOGRAM TRACING: CPT | Performed by: EMERGENCY MEDICINE

## 2023-09-13 PROCEDURE — 6360000002 HC RX W HCPCS

## 2023-09-13 PROCEDURE — 85025 COMPLETE CBC W/AUTO DIFF WBC: CPT

## 2023-09-13 PROCEDURE — C9113 INJ PANTOPRAZOLE SODIUM, VIA: HCPCS

## 2023-09-13 PROCEDURE — 94640 AIRWAY INHALATION TREATMENT: CPT

## 2023-09-13 PROCEDURE — 99239 HOSP IP/OBS DSCHRG MGMT >30: CPT | Performed by: FAMILY MEDICINE

## 2023-09-13 PROCEDURE — 6370000000 HC RX 637 (ALT 250 FOR IP): Performed by: INTERNAL MEDICINE

## 2023-09-13 PROCEDURE — 83880 ASSAY OF NATRIURETIC PEPTIDE: CPT

## 2023-09-13 PROCEDURE — 2060000000 HC ICU INTERMEDIATE R&B

## 2023-09-13 PROCEDURE — 99285 EMERGENCY DEPT VISIT HI MDM: CPT

## 2023-09-13 PROCEDURE — 80162 ASSAY OF DIGOXIN TOTAL: CPT

## 2023-09-13 PROCEDURE — 93005 ELECTROCARDIOGRAM TRACING: CPT

## 2023-09-13 PROCEDURE — 82805 BLOOD GASES W/O2 SATURATION: CPT

## 2023-09-13 PROCEDURE — 80053 COMPREHEN METABOLIC PANEL: CPT

## 2023-09-13 PROCEDURE — 2700000000 HC OXYGEN THERAPY PER DAY

## 2023-09-13 PROCEDURE — 84132 ASSAY OF SERUM POTASSIUM: CPT

## 2023-09-13 PROCEDURE — 92526 ORAL FUNCTION THERAPY: CPT

## 2023-09-13 PROCEDURE — 84484 ASSAY OF TROPONIN QUANT: CPT

## 2023-09-13 PROCEDURE — 71275 CT ANGIOGRAPHY CHEST: CPT

## 2023-09-13 PROCEDURE — 94660 CPAP INITIATION&MGMT: CPT

## 2023-09-13 PROCEDURE — 6360000002 HC RX W HCPCS: Performed by: EMERGENCY MEDICINE

## 2023-09-13 RX ORDER — OXYCODONE HYDROCHLORIDE 5 MG/1
2.5 TABLET ORAL EVERY 6 HOURS PRN
Qty: 45 TABLET | Refills: 0 | Status: SHIPPED | OUTPATIENT
Start: 2023-09-13 | End: 2023-09-13 | Stop reason: SDUPTHER

## 2023-09-13 RX ORDER — OXYCODONE HYDROCHLORIDE 5 MG/1
5 TABLET ORAL ONCE
Status: COMPLETED | OUTPATIENT
Start: 2023-09-13 | End: 2023-09-13

## 2023-09-13 RX ORDER — DESMOPRESSIN ACETATE 0.2 MG/1
0.2 TABLET ORAL 2 TIMES DAILY
Qty: 60 TABLET | Refills: 2 | Status: SHIPPED | OUTPATIENT
Start: 2023-09-13 | End: 2024-01-11

## 2023-09-13 RX ORDER — LIDOCAINE 4 G/G
1 PATCH TOPICAL DAILY
Qty: 30 EACH | Refills: 1 | Status: SHIPPED | OUTPATIENT
Start: 2023-09-14 | End: 2023-11-13

## 2023-09-13 RX ORDER — AMLODIPINE BESYLATE 5 MG/1
5 TABLET ORAL DAILY
Qty: 30 TABLET | Refills: 3 | Status: SHIPPED | OUTPATIENT
Start: 2023-09-14

## 2023-09-13 RX ORDER — GUAIFENESIN 400 MG/1
400 TABLET ORAL EVERY 8 HOURS
Qty: 56 TABLET | Refills: 0 | Status: SHIPPED | OUTPATIENT
Start: 2023-09-13

## 2023-09-13 RX ORDER — METOPROLOL SUCCINATE 25 MG/1
25 TABLET, EXTENDED RELEASE ORAL 2 TIMES DAILY
Qty: 60 TABLET | Refills: 1 | Status: SHIPPED | OUTPATIENT
Start: 2023-09-13

## 2023-09-13 RX ORDER — PREDNISONE 5 MG/1
5 TABLET ORAL DAILY
Qty: 10 TABLET | Refills: 0 | Status: SHIPPED | OUTPATIENT
Start: 2023-09-13 | End: 2023-09-23

## 2023-09-13 RX ORDER — OXYCODONE HYDROCHLORIDE 5 MG/1
2.5 TABLET ORAL EVERY 6 HOURS PRN
Qty: 45 TABLET | Refills: 0 | Status: SHIPPED | OUTPATIENT
Start: 2023-09-13 | End: 2023-10-13

## 2023-09-13 RX ORDER — FUROSEMIDE 10 MG/ML
20 INJECTION INTRAMUSCULAR; INTRAVENOUS ONCE
Status: COMPLETED | OUTPATIENT
Start: 2023-09-13 | End: 2023-09-13

## 2023-09-13 RX ORDER — HYDROXYZINE HYDROCHLORIDE 10 MG/1
10 TABLET, FILM COATED ORAL 3 TIMES DAILY PRN
Qty: 30 TABLET | Refills: 1 | Status: SHIPPED | OUTPATIENT
Start: 2023-09-13 | End: 2023-10-13

## 2023-09-13 RX ADMIN — OXYCODONE HYDROCHLORIDE 5 MG: 5 TABLET ORAL at 22:21

## 2023-09-13 RX ADMIN — SODIUM CHLORIDE, PRESERVATIVE FREE 40 MG: 5 INJECTION INTRAVENOUS at 09:20

## 2023-09-13 RX ADMIN — IPRATROPIUM BROMIDE AND ALBUTEROL SULFATE 1 DOSE: .5; 2.5 SOLUTION RESPIRATORY (INHALATION) at 12:41

## 2023-09-13 RX ADMIN — Medication 10 ML: at 09:23

## 2023-09-13 RX ADMIN — IPRATROPIUM BROMIDE AND ALBUTEROL SULFATE 1 DOSE: .5; 2.5 SOLUTION RESPIRATORY (INHALATION) at 17:10

## 2023-09-13 RX ADMIN — OXYCODONE HYDROCHLORIDE 2.5 MG: 5 TABLET ORAL at 16:52

## 2023-09-13 RX ADMIN — OXYCODONE HYDROCHLORIDE 2.5 MG: 5 TABLET ORAL at 11:18

## 2023-09-13 RX ADMIN — AMLODIPINE BESYLATE 5 MG: 5 TABLET ORAL at 09:21

## 2023-09-13 RX ADMIN — PREDNISONE 5 MG: 5 TABLET ORAL at 09:21

## 2023-09-13 RX ADMIN — FUROSEMIDE 20 MG: 10 INJECTION, SOLUTION INTRAMUSCULAR; INTRAVENOUS at 22:44

## 2023-09-13 RX ADMIN — IPRATROPIUM BROMIDE AND ALBUTEROL SULFATE 1 DOSE: .5; 2.5 SOLUTION RESPIRATORY (INHALATION) at 09:17

## 2023-09-13 RX ADMIN — Medication 10 ML: at 09:22

## 2023-09-13 RX ADMIN — HEPARIN 300 UNITS: 100 SYRINGE at 09:22

## 2023-09-13 RX ADMIN — APIXABAN 5 MG: 5 TABLET, FILM COATED ORAL at 09:21

## 2023-09-13 RX ADMIN — LEVOTHYROXINE SODIUM 75 MCG: 0.07 TABLET ORAL at 06:58

## 2023-09-13 RX ADMIN — GUAIFENESIN 400 MG: 400 TABLET ORAL at 03:18

## 2023-09-13 RX ADMIN — IOPAMIDOL 75 ML: 755 INJECTION, SOLUTION INTRAVENOUS at 23:29

## 2023-09-13 RX ADMIN — GUAIFENESIN 400 MG: 400 TABLET ORAL at 16:52

## 2023-09-13 RX ADMIN — BUDESONIDE INHALATION 500 MCG: 0.5 SUSPENSION RESPIRATORY (INHALATION) at 09:17

## 2023-09-13 RX ADMIN — LEVETIRACETAM 500 MG: 100 INJECTION INTRAVENOUS at 09:20

## 2023-09-13 RX ADMIN — ARFORMOTEROL TARTRATE 15 MCG: 15 SOLUTION RESPIRATORY (INHALATION) at 09:17

## 2023-09-13 RX ADMIN — DESMOPRESSIN ACETATE 200 MCG: 0.1 TABLET ORAL at 09:21

## 2023-09-13 RX ADMIN — AMIODARONE HYDROCHLORIDE 200 MG: 200 TABLET ORAL at 09:21

## 2023-09-13 RX ADMIN — METOPROLOL TARTRATE 25 MG: 25 TABLET, FILM COATED ORAL at 09:21

## 2023-09-13 RX ADMIN — GUAIFENESIN 400 MG: 400 TABLET ORAL at 09:21

## 2023-09-13 ASSESSMENT — ENCOUNTER SYMPTOMS
SORE THROAT: 0
RECTAL PAIN: 0
COUGH: 1
VOMITING: 0
FACIAL SWELLING: 0
STRIDOR: 1
SHORTNESS OF BREATH: 1
EYE REDNESS: 0
NAUSEA: 0
BACK PAIN: 0
WHEEZING: 1
EYE PAIN: 0

## 2023-09-13 ASSESSMENT — PAIN DESCRIPTION - LOCATION
LOCATION: CHEST
LOCATION: ABDOMEN;CHEST
LOCATION: CHEST

## 2023-09-13 ASSESSMENT — PAIN DESCRIPTION - ORIENTATION
ORIENTATION: MID
ORIENTATION: MID

## 2023-09-13 ASSESSMENT — PAIN SCALES - GENERAL
PAINLEVEL_OUTOF10: 9
PAINLEVEL_OUTOF10: 8
PAINLEVEL_OUTOF10: 0
PAINLEVEL_OUTOF10: 9
PAINLEVEL_OUTOF10: 0
PAINLEVEL_OUTOF10: 10
PAINLEVEL_OUTOF10: 7
PAINLEVEL_OUTOF10: 0
PAINLEVEL_OUTOF10: 0

## 2023-09-13 ASSESSMENT — PAIN DESCRIPTION - DESCRIPTORS
DESCRIPTORS: SHARP
DESCRIPTORS: ACHING;SORE
DESCRIPTORS: ACHING;SORE

## 2023-09-13 NOTE — CARE COORDINATION
Auth good through tomorrow for [facility]. RN sent message to nephro this am to check if stable for discharge today. Pasrr and ambulance on soft chart. 1235 Nephro cleared for discharge. Messaged family medicine to request discharge orders. 0 Spoke with family medicine, they intend to discharge today. Physicians ambulance arranged for [phone]. They asked for after 4pm to allow time for them to meet with spouse first to speak with him about code status. [facility] notified of discharge time. They cannot accept this patient in the middle of the night if physicians is running late due to her respiratory needs. Please do not allow patient to be discharged after 8:30 pm. If they delay after that reschedule for tomorrow morning. Confirmed with Nilesh that they do have NIV and can do NIV during the day 2 hours on 4 hours off and NIV at HS. Spouse notified of discharge time. For questions I can be reached at [phone].  Vidhi Willingham, [place]

## 2023-09-13 NOTE — PROGRESS NOTES
Perfect serve message sent to Dr. Jessica Kate to notify that BMP from this morning has resulted. Inquiring if patient is okay for D/C from nephrology POV. Awaiting response.

## 2023-09-13 NOTE — DISCHARGE SUMMARY
Discharge Summary    Indra Alanis  :  1955  MRN:  [id number]    Admit date:  2023  Discharge date:      Admitting Physician:  Dominick Pulido MD    Discharge Diagnoses:    Patient Active Problem List   Diagnosis    Chronic back pain    Hypothyroidism    Nephrosclerosis    Diabetes insipidus ([address])    Pulmonary sarcoidosis ([address])    Steroid-induced avascular necrosis of shoulder ([address])    Anemia due to chronic illness    Chronic pain syndrome    Bilateral hip pain    Debility    Altered mental status    BRBPR (bright red blood per rectum)    Severe pulmonary hypertension ([address])    Pulmonary hypertension    Chronic kidney disease, stage III (moderate) (HCC)    PAF (paroxysmal atrial fibrillation) ([facility]) currently in NSR    Mixed hyperlipidemia    Goals of care, counseling/discussion    Atrial fibrillation with RVR ([facility])    Chronic combined systolic and diastolic heart failure (HCC)    Chronic hypoxemic respiratory failure (HCC)    Mixed simple and mucopurulent chronic bronchitis (HCC)    Recurrent major depressive disorder, in partial remission (HCC)    Gait disturbance    Chest pain    Chronic, continuous use of opioids    PMB (postmenopausal bleeding)    Severe dysplasia of vagina    Epistaxis    Ventral hernia without obstruction or gangrene    Long term (current) use of systemic steroids    Nuclear senile cataract    Constipation    Abdominal pain    Small bowel obstruction (HCC)    Seizure (HCC)    Elevated troponin level    RVF (right ventricular failure) (HCC)    Moderate protein-calorie malnutrition (HCC)    Hypokalemia    Primary hypertension    Acute combined systolic and diastolic CHF, NYHA class 1 (HCC)    Acute on chronic diastolic CHF (congestive heart failure) (HCC)    Acute on chronic respiratory failure (HCC)    Shortness of breath    Palliative care encounter    Palliative care by specialist       Admission Condition:  poor    Discharged Condition:  stable    Hospital Course:   Narciso Louis chronic sinusitis. The mastoid air cells demonstrate no acute abnormality. There is an orogastric tube present. SOFT TISSUES/SKULL:  No acute abnormality of the visualized skull or soft tissues. 1.  No acute intracranial abnormality. 2.  Signs of deep white matter small vessel disease, stable. XR ABDOMEN FOR NG/OG/NE TUBE PLACEMENT    Result Date: 8/19/2023  EXAMINATION: ONE SUPINE XRAY VIEW(S) OF THE ABDOMEN 8/19/2023 8:50 am COMPARISON: None. HISTORY: ORDERING SYSTEM PROVIDED HISTORY: OG tube placement TECHNOLOGIST PROVIDED HISTORY: Reason for exam:->OG tube placement Portable? ->Yes FINDINGS: A single view of the lower chest upper abdomen is obtained. Due to the scoliosis the esophagus appears to be positioned to wards the left aspect of the thoracic spine. The left hemidiaphragm is also elevated. The tip of the NG tube is seen 6 cm inferior to the left hemidiaphragm and gastroesophageal junction. The NG tube should be advanced by an additional 5-10 cm if possible. .     1. The tip of the NG tube appears to be 6 cm inferior to the gastroesophageal junction. The side hole is seen at the level of the gastroesophageal junction. The NG tube should be advanced by an additional 5-10 cm if possible. XR CHEST PORTABLE    Result Date: 8/19/2023  EXAMINATION: ONE XRAY VIEW OF THE CHEST 8/19/2023 8:50 am COMPARISON: 10/19/2023 HISTORY: ORDERING SYSTEM PROVIDED HISTORY: et tube TECHNOLOGIST PROVIDED HISTORY: Reason for exam:->et tube FINDINGS: The tip of the endotracheal tube is 3.5 cm above the jaz. There is increased bibasilar parenchymal densities concerning for pneumonia and or atelectasis. This process now silhouettes the left diaphragm. The heart border appears stable. There is increased interstitial prominence within the lungs concerning for developing pulmonary edema or fluid overload.  The tip of the nasogastric tube projects over the body the stomach there are post treatment changes of the

## 2023-09-13 NOTE — DISCHARGE INSTR - COC
Continuity of Care Form    Patient Name: Abril Redman   :  1955  MRN:  [id number]    Admit date:  2023  Discharge date:  25    Code Status Order: Full Code   Advance Directives:     Admitting Physician:  Yoly Vizcaino MD  PCP: Kvng Gilbert MD    Discharging Nurse: Flora Medel RN  [facility] Unit/Room#: 6503/6985-C  Discharging Unit Phone Number: [phone]    Emergency Contact:   Extended Emergency Contact Information  Primary Emergency Contact: [facility]  Address: [address] [facility], [address] [facility] [address] [address] [facility] Phone: [phone]  Mobile Phone: [phone]  Relation: Spouse  Secondary Emergency Contact: [address] Phone: [phone]  Relation: Child    Past Surgical History:  Past Surgical History:   Procedure Laterality Date    ABDOMEN SURGERY      ischemic colitis    COLONOSCOPY      healed colitis    COLONOSCOPY  2014    COLONOSCOPY  2015    severe colitis    COLONOSCOPY  10/24/2016    COLONOSCOPY  2017    ECHO COMPL W DOP COLOR FLOW  2015         ECHO COMPLETE  2013         FRACTURE SURGERY  2010    Tibial right    HEMORRHOID SURGERY  2016    KYPHOSIS SURGERY      For comp.  fx T10    LUMBAR DISC SURGERY      OTHER SURGICAL HISTORY  11    removal r leg external fixator    OTHER SURGICAL HISTORY  2021    Partial Vaginectomy, Endometrial Biopsy    SIGMOIDOSCOPY N/A 3/19/2021    SIGMOIDOSCOPY HEMORRHOID BANDING performed by Cj Mai MD at [facility]      application TSF  45    UPPER GASTROINTESTINAL ENDOSCOPY  2016    UPPER GASTROINTESTINAL ENDOSCOPY  2017       Immunization History:   Immunization History   Administered Date(s) Administered    COVID-19, MODERNA BLUE border, Primary or Immunocompromised, (age 12y+), IM, 100 mcg/0.5mL 2021, 2021, 2022    COVID-19, MODERNA Bivalent, (age 12y+), IM, disoriented and alert - alert to self and place with periods of confusion to time and situation. IV Access:  - None    Nursing Mobility/ADLs:  Walking   Dependent  Transfer  Dependent  Bathing  Dependent  Dressing  Dependent  Toileting  Dependent  Feeding  Assisted - needs setup  Med Admin  Assisted  Med Delivery   whole in applesauce per patient preference    Wound Care Documentation and Therapy:        Elimination:  Continence: Bowel: Yes  Bladder: No  Urinary Catheter: Insertion Date: 09/05/2023 - for retention. To leave in place at this time per nephrology. Colostomy/Ileostomy/Ileal Conduit: No       Date of Last BM: 09/13/2023    Intake/Output Summary (Last 24 hours) at 9/13/2023 1623  Last data filed at 9/13/2023 1339  Gross per 24 hour   Intake 851.67 ml   Output 1300 ml   Net -448. 33 ml     I/O last 3 completed shifts: In: 1011.7 [P.O.:120; I.V.:641.7; IV Piggyback:250]  Out: 1750 [Urine:1750]    Safety Concerns: At Risk for Falls and Aspiration Risk    Impairments/Disabilities:      None    Nutrition Therapy:  Current Nutrition Therapy:   - Oral Diet:  Dysphagia - Pureed    Routes of Feeding: Oral  Liquids: Honey Thick Liquids  Daily Fluid Restriction: no  Last Modified Barium Swallow with Video (Video Swallowing Test): done on 08/19/2023    Treatments at the Time of Hospital Discharge:   Respiratory Treatments: breathing treatments- see medication list  Oxygen Therapy:  is on oxygen at 6 L/min per nasal cannula.   Ventilator:    - BiPAP    , CPAP/EPAP: 5 cmH2O only when sleeping    Rehab Therapies: Physical Therapy, Occupational Therapy, and Speech/Language Therapy  Weight Bearing Status/Restrictions: No weight bearing restrictions  Other Medical Equipment (for information only, NOT a DME order):  {EQUIPMENT:[id number]}  Other Treatments: ***    Patient's personal belongings (please select all that are sent with patient):  Clothing, cell phone and     RN SIGNATURE:  Electronically signed

## 2023-09-13 NOTE — PROGRESS NOTES
Nurse to nurse report called to [facility]. Report given to Dee Dee. All pertinent information relayed and questions answered.

## 2023-09-13 NOTE — PROGRESS NOTES
Perfect serve message sent to Dr. Souza Done this morning to inquire if nephrology will be okay with patient discharging today. Notified that no labs were ordered for this morning. Order obtained for stat BMP. BMP collected and sent to lab.

## 2023-09-13 NOTE — PLAN OF CARE
Problem: Chronic Conditions and Co-morbidities  Goal: Patient's chronic conditions and co-morbidity symptoms are monitored and maintained or improved  Outcome: Progressing     Problem: Discharge Planning  Goal: Discharge to home or other facility with appropriate resources  Outcome: Progressing     Problem: Safety - Adult  Goal: Free from fall injury  Outcome: Progressing     Problem: Pain  Goal: Verbalizes/displays adequate comfort level or baseline comfort level  Outcome: Progressing     Problem: Skin/Tissue Integrity  Goal: Absence of new skin breakdown  Description: 1. Monitor for areas of redness and/or skin breakdown  2. Assess vascular access sites hourly  3. Every 4-6 hours minimum:  Change oxygen saturation probe site  4. Every 4-6 hours:  If on nasal continuous positive airway pressure, respiratory therapy assess nares and determine need for appliance change or resting period.   Outcome: Progressing     Problem: ABCDS Injury Assessment  Goal: Absence of physical injury  Outcome: Progressing     Problem: Neurosensory - Adult  Goal: Achieves stable or improved neurological status  Outcome: Progressing     Problem: Respiratory - Adult  Goal: Achieves optimal ventilation and oxygenation  Outcome: Progressing     Problem: Cardiovascular - Adult  Goal: Maintains optimal cardiac output and hemodynamic stability  Outcome: Progressing     Problem: Skin/Tissue Integrity - Adult  Goal: Incisions, wounds, or drain sites healing without S/S of infection  Outcome: Progressing     Problem: Nutrition Deficit:  Goal: Optimize nutritional status  Outcome: Progressing

## 2023-09-13 NOTE — PLAN OF CARE
Problem: Chronic Conditions and Co-morbidities  Goal: Patient's chronic conditions and co-morbidity symptoms are monitored and maintained or improved  9/13/2023 1900 by Jung Muhammad RN  Outcome: Completed  9/13/2023 1230 by Jung Muhammad RN  Outcome: Progressing     Problem: Discharge Planning  Goal: Discharge to home or other facility with appropriate resources  9/13/2023 1900 by Jung Muhammad RN  Outcome: Completed  9/13/2023 1230 by Jung Muhammad RN  Outcome: Progressing     Problem: Safety - Adult  Goal: Free from fall injury  9/13/2023 1900 by Jung Muhammad RN  Outcome: Completed  9/13/2023 1230 by Jung Muhammad RN  Outcome: Progressing     Problem: Pain  Goal: Verbalizes/displays adequate comfort level or baseline comfort level  9/13/2023 1900 by Jung Muhammad RN  Outcome: Completed  9/13/2023 1230 by Jung Muhammad RN  Outcome: Progressing     Problem: Skin/Tissue Integrity  Goal: Absence of new skin breakdown  Description: 1. Monitor for areas of redness and/or skin breakdown  2. Assess vascular access sites hourly  3. Every 4-6 hours minimum:  Change oxygen saturation probe site  4. Every 4-6 hours:  If on nasal continuous positive airway pressure, respiratory therapy assess nares and determine need for appliance change or resting period.   9/13/2023 1900 by Jung Muhammad RN  Outcome: Completed  9/13/2023 1230 by Jugn Muhammad RN  Outcome: Progressing     Problem: ABCDS Injury Assessment  Goal: Absence of physical injury  9/13/2023 1900 by Jung Muhammad RN  Outcome: Completed  9/13/2023 1230 by Jung Muhammad RN  Outcome: Progressing     Problem: Neurosensory - Adult  Goal: Achieves stable or improved neurological status  9/13/2023 1900 by Jung Muhammad RN  Outcome: Completed  9/13/2023 1230 by Jung Muhammad RN  Outcome:

## 2023-09-13 NOTE — PROGRESS NOTES
Date: 9/12/2023    Time: 10:15 PM    Patient Placed On BIPAP/CPAP/ Non-Invasive Ventilation? Yes    If no must comment. Facial area red/color change? No           If YES are Blister/Lesion present? No   If yes must notify nursing staff  BIPAP/CPAP skin barrier?   Yes    Skin barrier type:mepilexlite       Comments:        Arlinda Romberg, RCP

## 2023-09-13 NOTE — PROGRESS NOTES
Call placed to [facility] ambulance who state that they are 7 minutes away from hospital to pick patient up. Discharge papers in soft chart. Patient's heart monitor removed and PICC line removed. Patient and her  at bedside updated.

## 2023-09-13 NOTE — PROGRESS NOTES
Perfect serve message sent to medical resident on call to notify that patient's  is currently at bedside. He states he was told to be here at 3:30 for meeting with medical team. Awaiting response.

## 2023-09-13 NOTE — PROGRESS NOTES
Spoke with family medicine resident over the phone. Per resident we are to remove patient's PICC line prior to discharge. Notified that patient will need a printed and signed script for narcotic for discharge to facility. Family medicine resident stated they will work on that now. Perfect serve message sent to Dr. Radha Glynn to inquire if we should remove ramirez prior to discharge or leave in place. Ramirez was placed during this stay for retention. Per Dr. Saleem Watts it\".

## 2023-09-13 NOTE — PROGRESS NOTES
[facility] Inpatient   Resident Progress Note    S:  Hospital day: 25    Brief Synopsis: Tea Jara is a 78 y.o. female with a past medical history of pulmonary sarcoidosis, atrial fibrillation on Eliquis, pulmonary hypertension, diabetes insipidus, hypothyroidism, hypertension, chronic kidney disease stage IIIa and combined systolic/diastolic heart failure who presents to the emergency department for shortness of breath. Patient was recently discharged from [facility] (08/18/23) due to acute hypoxic respiratory failure. Patient has had numerous hospital admissions for the same complaint. Hypotensive, tachycardic and hypoxic at presentation. She did decompensate and ended up in PEA. 2 rounds of CPR were performed and ROSC achieved. Patient admitted to the intensive care unit for acute hypoxic respiratory failure requiring intubation and vasopressors. She was extubated and on Bipap 08/29. She was denied LTAC at [facility] due to subactue compression fracture seen on 08/19 on CT and insufficient nutritional status as she was NPO due to BiPaP. She remained on BiPap and was slowly weaned to [facility] which she tolerated well. She was reevaluated with a swallow eval and able to be on puree diet. Neurosurgery consulted for the compression fracture which was stable and consulted PT/OT. Patient constantly complained of pain in her chest which is due to chest compressions she received on admission. She is being given tylenol, lidocaine patches and occasional one time pain relief for the pain; being cautious not to  impair her respiratory drive. Also managed with hydroxyzine PRN for anxiety. Social work re-evaluated her for [facility]ITZ and will be going to [employer] on discharge. Sepsis work up was done on 09/06 as patient was not looking well enough to be discharged to [address] [place] [address]. ABG showed poor oxygenation PO2 of 58 and poor O2 saturation SaO2 90%.  Also urine cx was positive for candida tropicalis which she has side hole is seen at the level of the gastroesophageal   junction. The NG tube should be advanced by an additional 5-10 cm if   possible. XR CHEST PORTABLE   Final Result   1. Increased interstitial prominence within the lungs concerning for   developing pulmonary edema or fluid overload   2. Increased bibasilar linear opacities suggest worsening atelectasis. 3. The position and alignment of the tubes and catheters appear within the   normal range         XR CHEST PORTABLE   Final Result   Cardiomediastinal silhouette appears enlarged. Prominent hilar/perihilar structures probably reflecting prominent pulmonary   vessels however cannot entirely exclude nodule/mass. Would advise a   follow-up PA chest radiograph. Mild bibasilar atelectasis or infiltrate. No gross pneumothorax. Deformity of the right humeral head. XR CHEST PORTABLE    (Results Pending)       A/P:  Principal Problem (Resolved):    Cardiac arrest ([address])  Active Problems:    Altered mental status    Pulmonary hypertension    Moderate protein-calorie malnutrition (HCC)    Shortness of breath    Palliative care encounter    Palliative care by specialist  Resolved Problems:    Pneumonia due to infectious organism      Acute hypoxic respiratory failure  Status post cardiac arrest  Left lower lobe pneumonia  Severe pulmonary hypertension  Pulmonary sarcoidosis  History of moderate COPD, stage II by gold classification on 5 to 6 L at home  On 4 L HFNC, wean as tolerated. On 5-6L at home   ProBNP  downtrending 9,191pg/ml 09/07  Continue solumedrol 40 mg IV every 8 hours  Continue Toprol XL and hydralazine.   Continue home medication Prednisone for Sarcoid  Bipap 2 hrs on and 4 hrs off and at night; O2 6L per Pulm    Chronic kidney disease  Hyponatremia and hypochloremia - resolved   History of diabetes insipidus  Hypernatremia 2/2 to [facility]  Nephro on board:  Monitor intake/output   Begin D5W at 100 cc for 6hrs  DDAVP 200

## 2023-09-14 LAB
ANION GAP SERPL CALCULATED.3IONS-SCNC: 14 MMOL/L (ref 7–16)
BASOPHILS # BLD: 0 K/UL (ref 0–0.2)
BASOPHILS NFR BLD: 0 % (ref 0–2)
BUN SERPL-MCNC: 14 MG/DL (ref 6–23)
CALCIUM SERPL-MCNC: 8.9 MG/DL (ref 8.6–10.2)
CHLORIDE SERPL-SCNC: 103 MMOL/L (ref 98–107)
CO2 SERPL-SCNC: 27 MMOL/L (ref 22–29)
CREAT SERPL-MCNC: 1 MG/DL (ref 0.5–1)
EOSINOPHIL # BLD: 0.05 K/UL (ref 0.05–0.5)
EOSINOPHILS RELATIVE PERCENT: 1 % (ref 0–6)
ERYTHROCYTE [DISTWIDTH] IN BLOOD BY AUTOMATED COUNT: 18.4 % (ref 11.5–15)
GFR SERPL CREATININE-BSD FRML MDRD: >60 ML/MIN/1.73M2
GLUCOSE SERPL-MCNC: 80 MG/DL (ref 74–99)
HCT VFR BLD AUTO: 30.6 % (ref 34–48)
HGB BLD-MCNC: 9.4 G/DL (ref 11.5–15.5)
LYMPHOCYTES NFR BLD: 0.41 K/UL (ref 1.5–4)
LYMPHOCYTES RELATIVE PERCENT: 8 % (ref 20–42)
MAGNESIUM SERPL-MCNC: 1.5 MG/DL (ref 1.6–2.6)
MCH RBC QN AUTO: 25.9 PG (ref 26–35)
MCHC RBC AUTO-ENTMCNC: 30.7 G/DL (ref 32–34.5)
MCV RBC AUTO: 84.3 FL (ref 80–99.9)
MONOCYTES NFR BLD: 0.23 K/UL (ref 0.1–0.95)
MONOCYTES NFR BLD: 4 % (ref 2–12)
MYELOCYTES ABSOLUTE COUNT: 0.05 K/UL
MYELOCYTES: 1 %
NEUTROPHILS NFR BLD: 86 % (ref 43–80)
NEUTS SEG NFR BLD: 4.47 K/UL (ref 1.8–7.3)
PLATELET # BLD AUTO: 178 K/UL (ref 130–450)
PMV BLD AUTO: 11.6 FL (ref 7–12)
POTASSIUM SERPL-SCNC: 3.4 MMOL/L (ref 3.5–5)
RBC # BLD AUTO: 3.63 M/UL (ref 3.5–5.5)
RBC # BLD: ABNORMAL 10*6/UL
SODIUM SERPL-SCNC: 144 MMOL/L (ref 132–146)
WBC OTHER # BLD: 5.2 K/UL (ref 4.5–11.5)

## 2023-09-14 PROCEDURE — 2700000000 HC OXYGEN THERAPY PER DAY

## 2023-09-14 PROCEDURE — 36415 COLL VENOUS BLD VENIPUNCTURE: CPT

## 2023-09-14 PROCEDURE — 83735 ASSAY OF MAGNESIUM: CPT

## 2023-09-14 PROCEDURE — 6370000000 HC RX 637 (ALT 250 FOR IP)

## 2023-09-14 PROCEDURE — 85025 COMPLETE CBC W/AUTO DIFF WBC: CPT

## 2023-09-14 PROCEDURE — 97161 PT EVAL LOW COMPLEX 20 MIN: CPT

## 2023-09-14 PROCEDURE — 94640 AIRWAY INHALATION TREATMENT: CPT

## 2023-09-14 PROCEDURE — 2580000003 HC RX 258

## 2023-09-14 PROCEDURE — 6360000002 HC RX W HCPCS: Performed by: FAMILY MEDICINE

## 2023-09-14 PROCEDURE — 94660 CPAP INITIATION&MGMT: CPT

## 2023-09-14 PROCEDURE — 97535 SELF CARE MNGMENT TRAINING: CPT

## 2023-09-14 PROCEDURE — 97165 OT EVAL LOW COMPLEX 30 MIN: CPT

## 2023-09-14 PROCEDURE — 99221 1ST HOSP IP/OBS SF/LOW 40: CPT | Performed by: FAMILY MEDICINE

## 2023-09-14 PROCEDURE — 80048 BASIC METABOLIC PNL TOTAL CA: CPT

## 2023-09-14 PROCEDURE — 2060000000 HC ICU INTERMEDIATE R&B

## 2023-09-14 RX ORDER — OMEGA-3-ACID ETHYL ESTERS 1 G/1
1 CAPSULE, LIQUID FILLED ORAL 2 TIMES DAILY
Status: DISCONTINUED | OUTPATIENT
Start: 2023-09-14 | End: 2023-09-17 | Stop reason: HOSPADM

## 2023-09-14 RX ORDER — ACETAMINOPHEN 650 MG/1
650 SUPPOSITORY RECTAL EVERY 6 HOURS PRN
Status: DISCONTINUED | OUTPATIENT
Start: 2023-09-14 | End: 2023-09-17 | Stop reason: HOSPADM

## 2023-09-14 RX ORDER — AMIODARONE HYDROCHLORIDE 200 MG/1
200 TABLET ORAL DAILY
Status: DISCONTINUED | OUTPATIENT
Start: 2023-09-14 | End: 2023-09-17 | Stop reason: HOSPADM

## 2023-09-14 RX ORDER — LIDOCAINE 4 G/G
1 PATCH TOPICAL DAILY
Status: DISCONTINUED | OUTPATIENT
Start: 2023-09-14 | End: 2023-09-17 | Stop reason: HOSPADM

## 2023-09-14 RX ORDER — GUAIFENESIN 400 MG/1
400 TABLET ORAL EVERY 8 HOURS
Status: DISCONTINUED | OUTPATIENT
Start: 2023-09-14 | End: 2023-09-17 | Stop reason: HOSPADM

## 2023-09-14 RX ORDER — DULOXETIN HYDROCHLORIDE 60 MG/1
60 CAPSULE, DELAYED RELEASE ORAL DAILY
Status: DISCONTINUED | OUTPATIENT
Start: 2023-09-14 | End: 2023-09-17 | Stop reason: HOSPADM

## 2023-09-14 RX ORDER — POLYETHYLENE GLYCOL 3350 17 G/17G
17 POWDER, FOR SOLUTION ORAL DAILY PRN
Status: DISCONTINUED | OUTPATIENT
Start: 2023-09-14 | End: 2023-09-14 | Stop reason: SDUPTHER

## 2023-09-14 RX ORDER — PREDNISONE 5 MG/1
5 TABLET ORAL DAILY
Status: DISCONTINUED | OUTPATIENT
Start: 2023-09-14 | End: 2023-09-17 | Stop reason: HOSPADM

## 2023-09-14 RX ORDER — HYDROXYZINE HYDROCHLORIDE 10 MG/1
10 TABLET, FILM COATED ORAL 3 TIMES DAILY PRN
Status: DISCONTINUED | OUTPATIENT
Start: 2023-09-14 | End: 2023-09-16

## 2023-09-14 RX ORDER — FUROSEMIDE 40 MG/1
40 TABLET ORAL DAILY
Status: DISCONTINUED | OUTPATIENT
Start: 2023-09-14 | End: 2023-09-17 | Stop reason: HOSPADM

## 2023-09-14 RX ORDER — FERROUS SULFATE 325(65) MG
325 TABLET ORAL 2 TIMES DAILY WITH MEALS
Status: DISCONTINUED | OUTPATIENT
Start: 2023-09-14 | End: 2023-09-17 | Stop reason: HOSPADM

## 2023-09-14 RX ORDER — POLYVINYL ALCOHOL 14 MG/ML
1 SOLUTION/ DROPS OPHTHALMIC PRN
Status: DISCONTINUED | OUTPATIENT
Start: 2023-09-14 | End: 2023-09-17 | Stop reason: HOSPADM

## 2023-09-14 RX ORDER — DOCUSATE SODIUM 100 MG/1
100 CAPSULE, LIQUID FILLED ORAL 2 TIMES DAILY
Status: DISCONTINUED | OUTPATIENT
Start: 2023-09-14 | End: 2023-09-17 | Stop reason: HOSPADM

## 2023-09-14 RX ORDER — ACETAMINOPHEN 325 MG/1
650 TABLET ORAL EVERY 6 HOURS PRN
Status: DISCONTINUED | OUTPATIENT
Start: 2023-09-14 | End: 2023-09-17 | Stop reason: HOSPADM

## 2023-09-14 RX ORDER — SODIUM CHLORIDE 0.9 % (FLUSH) 0.9 %
5-40 SYRINGE (ML) INJECTION EVERY 12 HOURS SCHEDULED
Status: DISCONTINUED | OUTPATIENT
Start: 2023-09-14 | End: 2023-09-17 | Stop reason: HOSPADM

## 2023-09-14 RX ORDER — AMLODIPINE BESYLATE 5 MG/1
5 TABLET ORAL DAILY
Status: DISCONTINUED | OUTPATIENT
Start: 2023-09-14 | End: 2023-09-17 | Stop reason: HOSPADM

## 2023-09-14 RX ORDER — ONDANSETRON 2 MG/ML
4 INJECTION INTRAMUSCULAR; INTRAVENOUS EVERY 6 HOURS PRN
Status: DISCONTINUED | OUTPATIENT
Start: 2023-09-14 | End: 2023-09-17 | Stop reason: HOSPADM

## 2023-09-14 RX ORDER — FAMOTIDINE 20 MG/1
20 TABLET, FILM COATED ORAL DAILY
Status: DISCONTINUED | OUTPATIENT
Start: 2023-09-14 | End: 2023-09-17 | Stop reason: HOSPADM

## 2023-09-14 RX ORDER — ARFORMOTEROL TARTRATE 15 UG/2ML
15 SOLUTION RESPIRATORY (INHALATION)
Status: DISCONTINUED | OUTPATIENT
Start: 2023-09-14 | End: 2023-09-17 | Stop reason: HOSPADM

## 2023-09-14 RX ORDER — POLYETHYLENE GLYCOL 3350 17 G/17G
17 POWDER, FOR SOLUTION ORAL DAILY PRN
Status: DISCONTINUED | OUTPATIENT
Start: 2023-09-14 | End: 2023-09-17 | Stop reason: HOSPADM

## 2023-09-14 RX ORDER — SODIUM CHLORIDE 0.9 % (FLUSH) 0.9 %
5-40 SYRINGE (ML) INJECTION PRN
Status: DISCONTINUED | OUTPATIENT
Start: 2023-09-14 | End: 2023-09-17 | Stop reason: HOSPADM

## 2023-09-14 RX ORDER — SODIUM CHLORIDE 9 MG/ML
INJECTION, SOLUTION INTRAVENOUS PRN
Status: DISCONTINUED | OUTPATIENT
Start: 2023-09-14 | End: 2023-09-17 | Stop reason: HOSPADM

## 2023-09-14 RX ORDER — METOPROLOL SUCCINATE 25 MG/1
25 TABLET, EXTENDED RELEASE ORAL 2 TIMES DAILY
Status: DISCONTINUED | OUTPATIENT
Start: 2023-09-14 | End: 2023-09-17 | Stop reason: HOSPADM

## 2023-09-14 RX ORDER — DESMOPRESSIN ACETATE 0.1 MG/1
200 TABLET ORAL 2 TIMES DAILY
Status: DISCONTINUED | OUTPATIENT
Start: 2023-09-14 | End: 2023-09-17 | Stop reason: HOSPADM

## 2023-09-14 RX ORDER — ONDANSETRON 4 MG/1
4 TABLET, ORALLY DISINTEGRATING ORAL EVERY 8 HOURS PRN
Status: DISCONTINUED | OUTPATIENT
Start: 2023-09-14 | End: 2023-09-17 | Stop reason: HOSPADM

## 2023-09-14 RX ORDER — BUDESONIDE 0.5 MG/2ML
0.5 INHALANT ORAL
Status: DISCONTINUED | OUTPATIENT
Start: 2023-09-14 | End: 2023-09-17 | Stop reason: HOSPADM

## 2023-09-14 RX ORDER — ALBUTEROL SULFATE 2.5 MG/3ML
2.5 SOLUTION RESPIRATORY (INHALATION) EVERY 6 HOURS PRN
Status: DISCONTINUED | OUTPATIENT
Start: 2023-09-14 | End: 2023-09-17 | Stop reason: HOSPADM

## 2023-09-14 RX ORDER — OXYCODONE HYDROCHLORIDE 5 MG/1
2.5 TABLET ORAL EVERY 6 HOURS PRN
Status: DISCONTINUED | OUTPATIENT
Start: 2023-09-14 | End: 2023-09-17 | Stop reason: HOSPADM

## 2023-09-14 RX ORDER — POTASSIUM CHLORIDE 20 MEQ/1
20 TABLET, EXTENDED RELEASE ORAL DAILY
Status: DISCONTINUED | OUTPATIENT
Start: 2023-09-14 | End: 2023-09-17 | Stop reason: HOSPADM

## 2023-09-14 RX ORDER — LEVETIRACETAM 500 MG/1
500 TABLET ORAL 2 TIMES DAILY
Status: DISCONTINUED | OUTPATIENT
Start: 2023-09-14 | End: 2023-09-17 | Stop reason: HOSPADM

## 2023-09-14 RX ADMIN — DOCUSATE SODIUM 100 MG: 100 CAPSULE, LIQUID FILLED ORAL at 02:50

## 2023-09-14 RX ADMIN — DESMOPRESSIN ACETATE 200 MCG: 0.1 TABLET ORAL at 11:25

## 2023-09-14 RX ADMIN — OXYCODONE HYDROCHLORIDE 2.5 MG: 5 TABLET ORAL at 09:09

## 2023-09-14 RX ADMIN — LEVOTHYROXINE SODIUM 75 MCG: 0.03 TABLET ORAL at 07:22

## 2023-09-14 RX ADMIN — BUDESONIDE INHALATION 500 MCG: 0.5 SUSPENSION RESPIRATORY (INHALATION) at 20:33

## 2023-09-14 RX ADMIN — PREDNISONE 5 MG: 5 TABLET ORAL at 09:24

## 2023-09-14 RX ADMIN — APIXABAN 5 MG: 5 TABLET, FILM COATED ORAL at 02:50

## 2023-09-14 RX ADMIN — OMEGA-3-ACID ETHYL ESTERS 1 G: 1 CAPSULE, LIQUID FILLED ORAL at 09:24

## 2023-09-14 RX ADMIN — BUDESONIDE INHALATION 500 MCG: 0.5 SUSPENSION RESPIRATORY (INHALATION) at 08:22

## 2023-09-14 RX ADMIN — DOCUSATE SODIUM 100 MG: 100 CAPSULE, LIQUID FILLED ORAL at 09:10

## 2023-09-14 RX ADMIN — HYDROXYZINE HYDROCHLORIDE 10 MG: 10 TABLET ORAL at 09:10

## 2023-09-14 RX ADMIN — HYDROXYZINE HYDROCHLORIDE 10 MG: 10 TABLET ORAL at 20:25

## 2023-09-14 RX ADMIN — DULOXETINE HYDROCHLORIDE 60 MG: 60 CAPSULE, DELAYED RELEASE ORAL at 09:10

## 2023-09-14 RX ADMIN — METOPROLOL SUCCINATE 25 MG: 25 TABLET, FILM COATED, EXTENDED RELEASE ORAL at 20:25

## 2023-09-14 RX ADMIN — FAMOTIDINE 20 MG: 20 TABLET ORAL at 09:10

## 2023-09-14 RX ADMIN — ARFORMOTEROL TARTRATE 15 MCG: 15 SOLUTION RESPIRATORY (INHALATION) at 20:33

## 2023-09-14 RX ADMIN — OXYCODONE HYDROCHLORIDE 2.5 MG: 5 TABLET ORAL at 16:44

## 2023-09-14 RX ADMIN — AMLODIPINE BESYLATE 5 MG: 5 TABLET ORAL at 09:11

## 2023-09-14 RX ADMIN — POTASSIUM CHLORIDE 20 MEQ: 1500 TABLET, EXTENDED RELEASE ORAL at 09:10

## 2023-09-14 RX ADMIN — FERROUS SULFATE TAB 325 MG (65 MG ELEMENTAL FE) 325 MG: 325 (65 FE) TAB at 09:10

## 2023-09-14 RX ADMIN — LEVETIRACETAM 500 MG: 500 TABLET, FILM COATED ORAL at 20:25

## 2023-09-14 RX ADMIN — APIXABAN 5 MG: 5 TABLET, FILM COATED ORAL at 20:25

## 2023-09-14 RX ADMIN — DOCUSATE SODIUM 100 MG: 100 CAPSULE, LIQUID FILLED ORAL at 20:25

## 2023-09-14 RX ADMIN — LEVETIRACETAM 500 MG: 500 TABLET, FILM COATED ORAL at 09:10

## 2023-09-14 RX ADMIN — LEVETIRACETAM 500 MG: 500 TABLET, FILM COATED ORAL at 02:50

## 2023-09-14 RX ADMIN — SODIUM CHLORIDE, PRESERVATIVE FREE 10 ML: 5 INJECTION INTRAVENOUS at 09:13

## 2023-09-14 RX ADMIN — GUAIFENESIN 400 MG: 400 TABLET ORAL at 16:44

## 2023-09-14 RX ADMIN — AMIODARONE HYDROCHLORIDE 200 MG: 200 TABLET ORAL at 09:12

## 2023-09-14 RX ADMIN — ARFORMOTEROL TARTRATE 15 MCG: 15 SOLUTION RESPIRATORY (INHALATION) at 08:22

## 2023-09-14 RX ADMIN — GUAIFENESIN 400 MG: 400 TABLET ORAL at 09:10

## 2023-09-14 RX ADMIN — FERROUS SULFATE TAB 325 MG (65 MG ELEMENTAL FE) 325 MG: 325 (65 FE) TAB at 16:44

## 2023-09-14 RX ADMIN — METOPROLOL SUCCINATE 25 MG: 25 TABLET, FILM COATED, EXTENDED RELEASE ORAL at 09:11

## 2023-09-14 RX ADMIN — APIXABAN 5 MG: 5 TABLET, FILM COATED ORAL at 09:10

## 2023-09-14 ASSESSMENT — PAIN DESCRIPTION - ORIENTATION
ORIENTATION: MID
ORIENTATION: ANTERIOR
ORIENTATION: ANTERIOR

## 2023-09-14 ASSESSMENT — PAIN SCALES - GENERAL
PAINLEVEL_OUTOF10: 10
PAINLEVEL_OUTOF10: 9
PAINLEVEL_OUTOF10: 10
PAINLEVEL_OUTOF10: 5
PAINLEVEL_OUTOF10: 5

## 2023-09-14 ASSESSMENT — PAIN DESCRIPTION - FREQUENCY: FREQUENCY: CONTINUOUS

## 2023-09-14 ASSESSMENT — PAIN DESCRIPTION - LOCATION
LOCATION: CHEST
LOCATION: ABDOMEN;CHEST;SHOULDER
LOCATION: CHEST

## 2023-09-14 ASSESSMENT — PAIN DESCRIPTION - PAIN TYPE: TYPE: ACUTE PAIN

## 2023-09-14 ASSESSMENT — PAIN DESCRIPTION - DESCRIPTORS
DESCRIPTORS: ACHING
DESCRIPTORS: ACHING
DESCRIPTORS: SHARP

## 2023-09-14 ASSESSMENT — PAIN SCALES - WONG BAKER: WONGBAKER_NUMERICALRESPONSE: 0

## 2023-09-14 ASSESSMENT — PAIN DESCRIPTION - ONSET: ONSET: ON-GOING

## 2023-09-14 ASSESSMENT — PAIN - FUNCTIONAL ASSESSMENT: PAIN_FUNCTIONAL_ASSESSMENT: PREVENTS OR INTERFERES SOME ACTIVE ACTIVITIES AND ADLS

## 2023-09-14 NOTE — PROGRESS NOTES
Transfers Sit to stand: NT  Stand to sit: NT  Stand pivot: NT  Sit to stand: Madi  Stand to sit: Madi  Stand pivot: Madi with AD   Ambulation    NT  >100 feet with AD Madi   Stair negotiation: ascended and descended  NT  4 steps with 2 rail Madi   ROM BUE:  Refer to OT note  BLE: NT     Strength BUE:  Refer to OT note  BLE:  NT     Balance Sitting EOB:  NT  Dynamic Standing:  NT  Sitting EOB:  Independent   Dynamic Standing:  Madi with AD     Pt is A & O x 1 to self: pt was able to follow simple 1 step directions  Sensation:  Pt denies numbness and tingling to extremities  Edema:  unremarkable     Patient education  Pt educated on role of PT, importance of OOB mobility, and safety with functional mobility     Patient response to education:   Pt verbalized understanding Pt demonstrated skill Pt requires further education in this area   partial partial x     ASSESSMENT:    Conditions Requiring Skilled Therapeutic Intervention:    [x]Decreased strength     [x]Decreased ROM  [x]Decreased functional mobility  [x]Decreased balance   [x]Decreased endurance   [x]Decreased posture  []Decreased sensation  []Decreased coordination   []Decreased vision  [x]Decreased safety awareness   []Increased pain       Comments:  Medically cleared for session by RN. Pt was in bed upon PT entry and agreeable to participate. Pt was lethargic throughout session and required max verbal cues to keep her eyes open. Pt completed log roll with assistance for chloe pad repositioning. Pt declined further functional mobility at this time despite education on importance of OOB mobility. Pt was assisted with repositioning in bed for comfort. All needs were met and call light in reach at conclusion of session.      Treatment:  Patient practiced and was instructed in the following treatment:    Bed mobility training - pt given verbal and tactile cues to facilitate proper sequencing and safety during rolling and supine>sit as well as provided with minutes     Evaluation Time includes thorough review of current medical information, gathering information on past medical history/social history and prior level of function, completion of standardized testing/informal observation of tasks, assessment of data and education on plan of care and goals.     CPT codes:  [x] Low Complexity PT evaluation 22003  [] Moderate Complexity PT evaluation 12210  [] High Complexity PT evaluation 11074  [] PT Re-evaluation 19517  [] Gait training 53202 0 minutes  [] Manual therapy 24863 0 minutes  [] Therapeutic activities 49510 0 minutes  [] Therapeutic exercises 06089 0 minutes  [] Neuromuscular reeducation 18107 0 minutes     Toni Ovalle PT, DPT  [id number]

## 2023-09-14 NOTE — ED NOTES
Nurse to nurse report given to RN on 4SE. All questions answered. Patient placed in transport.       Karla Asher RN  09/14/23 0930

## 2023-09-14 NOTE — PLAN OF CARE
Talked with Margaret Dyer regarding her possible transfer to a tertiary care center for possible heart cath given Pulmonology recommendations. She seems to be agreeable with plan but she is requesting to have this talk with the primary care team when her  is present in the room before committing to the transfer. Spoke with spouse Michael Traylor at [phone] . Explained the situation regarding the transfer and that Margaret Dyer requested him to be present during this decision. He understood and stated that he will be here tomorrow at around 11 AM to talk with the primary care team.    All questions were answered. Update .      This note is electronically signed by Cindie Romberg, MD on 9/14/2023 at 3:47 PM

## 2023-09-14 NOTE — PROGRESS NOTES
OCCUPATIONAL THERAPY INITIAL EVALUATION    [address], [place], [place]      CDHT:                                                  Patient Name: Mary Wu  MRN: [id number]  : 1955  Room: [address] [place] [address]    Evaluating OT: Jose Bro MOT, OTR/L  # [id number]    Referring Provider:  Tamia Rios MD  Specific Provider Orders:  Arthurine Hailstone and Treat\"23    Diagnosis: History of cardiac arrest [Z86.74]  Congestive heart failure, unspecified HF chronicity, unspecified heart failure type ([address]) [I50.9]    Pt was re-admitted several hours after d/c with SOB/Hypoxia     Pertinent Medical History:  Pt has a past medical history of A-fib ([address]), Abnormal mammogram, Acute on chronic respiratory failure ([address]), Anemia, Anemia due to chronic illness, Ankle fracture, left, 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[address]), Backache, Benign hypertension, CHF (congestive heart failure) ([address]), Chronic back pain, Chronic kidney disease, Chronic pain disorder, Chronic, continuous use of opioids, Compression fracture of thoracic vertebra ([address]), COPD (chronic obstructive pulmonary disease) ([address]), Debility, Deformity of ankle and foot, acquired, Depression, Diabetes insipidus ([address]), Diverticulitis, Dry eyes, Encephalopathy acute, Gait disturbance, GERD (gastroesophageal reflux disease), Hemorrhoids, Hernia, History of blood transfusion, History of Clostridium difficile, Hyperlipidemia, Hypothyroidism, Ischemic colitis, enteritis, or enterocolitis ([address]), Long term (current) use of systemic steroids, Long-term current use of steroids, Lumbar disc herniation, Myalgia and myositis, unspecified, Nephrosclerosis, Nonunion of fracture, Osteoarthritis, PAF (paroxysmal atrial fibrillation) ([address]), 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[address]), Pulmonary hypertension ([address]), Pulmonary sarcoidosis ([address]), Rectal bleeding, Rhabdomyolysis, Steroid-induced avascular

## 2023-09-14 NOTE — TELEPHONE ENCOUNTER
Writer contacted Dr. Benita Cr to inform of 30 day readmission risk. Writer's attempt to contact Dr. Bobo Toledo was unsuccessful.        Call Back: If you need to call back to inform of disposition you can contact me at [phone]

## 2023-09-14 NOTE — H&P
[facility]  Family Medicine Residency Program  History and Physical    Patient:  Gloria Mcgovern 78 y.o. female MRN: [id number]     Date of Service: 9/13/2023    Hospital Day: 1      Chief complaint: had concerns including Shortness of Breath (Pt sent in from [address] [place] [address] for hypoxia at 79% O2 on 6L and still hypoxic on Bipap per facility. Pt was discharged today from the floor to facility after a post-cardiac arrest on August 10th. ). History of Present Illness   The patient is a 78 y.o. female with a past medical history of pulmonary sarcoidosis, atrial fibrillation on Eliquis, pulmonary hypertension, diabetes insipidus, hypothyroidism, hypertension, chronic kidney disease stage IIIa and combined systolic/diastolic heart failure who presents to the emergency department for shortness of breath. Patient was discharged from [facility] 2-3 hours earlier after being admitted for 25 days for acute hypoxic respiratory failure episode that also involved patient having a cardiac arrest requiring ICU stay during that admission. This time, patient was sent in from [facility] after complaints of shortness of breath and being noticed with a hypoxia of 79% O2 on her baseline of 6 L nasal cannula. States she would not want to have compressions, intubation, Resuscitative Medications, defib/ cardioversion. Agreeable with Code status change. In the ED:   Saturating 90% on 6L nasal cannula. Hemodynamically stable   EKG: Normal sinus rhythm with sinus arrhythmia, Stable from previous. Troponin 31>28. Nena Drones Delta negative. CXray pending, CTA chest pending  BMP and CBC no leukocytosis, hemoglobin 9 improved from earlier.  Grossly normal.  proBNP obtained in the ED 6, 720, improved from earlier level of 9,191 on  9/6/2023    Given 20mg IV lasix and admitted for CHF exacerbation          Past Medical History:      Diagnosis Date    A-fib [facility])     follows with Dr Tiffanie Seo    Abnormal mammogram 2010 Acute on chronic respiratory failure ([address]) 05/05/2017    Anemia     Anemia due to chronic illness     Ankle fracture, left 2008    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[address]) 03/26/2007    Backache     Benign hypertension     CHF (congestive heart failure) ([facility])     Chronic back pain     Chronic kidney disease     stage 3    Chronic pain disorder     Chronic, continuous use of opioids     Compression fracture of thoracic vertebra ([facility])     T10    COPD (chronic obstructive pulmonary disease) ([address])     Debility     Deformity of ankle and foot, acquired 01/31/2011    Depression     Diabetes insipidus ([address])     Diverticulitis     Dry eyes     Encephalopathy acute 05/05/2017    Gait disturbance     GERD (gastroesophageal reflux disease)     Hemorrhoids 01/13/2012    Hernia     History of blood transfusion approx 05/2017    History of Clostridium difficile     negative culture 12-15-15; resolved    Hyperlipidemia     Hypothyroidism     Ischemic colitis, enteritis, or enterocolitis ([address])     Long term (current) use of systemic steroids 07/10/2022    Long-term current use of steroids     Lumbar disc herniation     Myalgia and myositis, unspecified     Nephrosclerosis     Nonunion of fracture 02/22/2011    Osteoarthritis     severe    PAF (paroxysmal atrial fibrillation) ([facility])     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[facility])     PCWP mean:26.0 PA mean: 24.00; denies any recent issues    Pulmonary hypertension ([facility])     ventricular diastolic function consistent with abnormal relaxation (stage I)    Pulmonary sarcoidosis ([facility])     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, f/u w/ PCP    Rectal bleeding     Rhabdomyolysis 05/09/2011    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

## 2023-09-14 NOTE — PROGRESS NOTES
ABG drawn x 1 from Right Radial. Patient had NormalAllen's Test.  Patient was on 6 liters/min via nasal cannula  at time of puncture. Pressure held for 5. No bleeding or bruising noted at puncture site.   Patient tolerated procedure well      Performed by Jorge A Rock RCP

## 2023-09-14 NOTE — PROGRESS NOTES
On admission pt's SPO2 was at 98%. Pt pulled off her O2 and was at 82%. Her O2 was put back on and she is now at 96% NC on 6L.

## 2023-09-14 NOTE — PROGRESS NOTES
Pt does not require picu level of care, fm authorized transfer to general floor. IV flushes and has blood return, no need for IV team at this time.

## 2023-09-14 NOTE — PROGRESS NOTES
Pt's NC was off again and SPO2 was at 52%, and non-re breather was placed on her and she's at SPO2 98%.

## 2023-09-14 NOTE — CARE COORDINATION
Spoke with Dr. Aileen Stevenson, she is recommending tertiary care transfer to see if she is a candidate for heart cath in Transfer. She is too medically complex for that here. Message sent to family medicine regarding this. They are coming to discuss with patient. For questions I can be reached at [phone].  Estefania Ni

## 2023-09-14 NOTE — PROGRESS NOTES
4 Eyes Skin Assessment     NAME:  Rob Batista  YOB: 1955  MEDICAL RECORD NUMBER:  [id number]    The patient is being assessed for  Admission    I agree that at least one RN has performed a thorough Head to Toe Skin Assessment on the patient. ALL assessment sites listed below have been assessed. Areas assessed by both nurses:    Head, Face, Ears, Shoulders, Back, Chest, Arms, Elbows, Hands, Sacrum. Buttock, Coccyx, Ischium, and Legs. Feet and Heels        Does the Patient have a Wound?  No noted wound(s)       Blas Prevention initiated by RN: Yes  Wound Care Orders initiated by RN: Yes    Pressure Injury (Stage 3,4, Unstageable, DTI, NWPT, and Complex wounds) if present, place Wound referral order by RN under : No    New Ostomies, if present place, Ostomy referral order under : No     Nurse 1 eSignature: Electronically signed by Jorge Saunders RN on 9/14/23 at 6:03 AM EDT    **SHARE this note so that the co-signing nurse can place an eSignature**    Nurse 2 eSignature: Electronically signed by Rodrigo Hoff RN on 9/14/23 at 6:05 AM EDT Pt continues to experience increased work of breathing. Accessory muscle use noted. Rales heard upon auscultation.      Marianela Marino RN  10/13/17 8425

## 2023-09-15 ENCOUNTER — HOSPITAL ENCOUNTER (OUTPATIENT)
Dept: OTHER | Age: 68
Setting detail: THERAPIES SERIES
Discharge: HOME | End: 2023-09-15

## 2023-09-15 LAB
ANION GAP SERPL CALCULATED.3IONS-SCNC: 10 MMOL/L (ref 7–16)
BACTERIA URNS QL MICRO: ABNORMAL
BASOPHILS # BLD: 0 K/UL (ref 0–0.2)
BASOPHILS NFR BLD: 0 % (ref 0–2)
BILIRUB UR QL STRIP: NEGATIVE
BUN SERPL-MCNC: 12 MG/DL (ref 6–23)
CALCIUM SERPL-MCNC: 9.1 MG/DL (ref 8.6–10.2)
CHLORIDE SERPL-SCNC: 103 MMOL/L (ref 98–107)
CLARITY UR: ABNORMAL
CO2 SERPL-SCNC: 31 MMOL/L (ref 22–29)
COLOR UR: YELLOW
CREAT SERPL-MCNC: 0.8 MG/DL (ref 0.5–1)
EKG ATRIAL RATE: 62 BPM
EKG ATRIAL RATE: 72 BPM
EKG P AXIS: 22 DEGREES
EKG P AXIS: 46 DEGREES
EKG P-R INTERVAL: 132 MS
EKG P-R INTERVAL: 134 MS
EKG Q-T INTERVAL: 396 MS
EKG Q-T INTERVAL: 448 MS
EKG QRS DURATION: 84 MS
EKG QRS DURATION: 88 MS
EKG QTC CALCULATION (BAZETT): 433 MS
EKG QTC CALCULATION (BAZETT): 454 MS
EKG R AXIS: 76 DEGREES
EKG R AXIS: 86 DEGREES
EKG T AXIS: 18 DEGREES
EKG T AXIS: 37 DEGREES
EKG VENTRICULAR RATE: 62 BPM
EKG VENTRICULAR RATE: 72 BPM
EOSINOPHIL # BLD: 0.08 K/UL (ref 0.05–0.5)
EOSINOPHILS RELATIVE PERCENT: 2 % (ref 0–6)
EPI CELLS #/AREA URNS HPF: ABNORMAL /HPF
ERYTHROCYTE [DISTWIDTH] IN BLOOD BY AUTOMATED COUNT: 18.1 % (ref 11.5–15)
GFR SERPL CREATININE-BSD FRML MDRD: >60 ML/MIN/1.73M2
GLUCOSE BLD-MCNC: 98 MG/DL (ref 74–99)
GLUCOSE SERPL-MCNC: 68 MG/DL (ref 74–99)
GLUCOSE UR STRIP-MCNC: NEGATIVE MG/DL
HCT VFR BLD AUTO: 30.6 % (ref 34–48)
HGB BLD-MCNC: 9.3 G/DL (ref 11.5–15.5)
HGB UR QL STRIP.AUTO: ABNORMAL
KETONES UR STRIP-MCNC: NEGATIVE MG/DL
LEUKOCYTE ESTERASE UR QL STRIP: ABNORMAL
LYMPHOCYTES NFR BLD: 0.25 K/UL (ref 1.5–4)
LYMPHOCYTES RELATIVE PERCENT: 5 % (ref 20–42)
MAGNESIUM SERPL-MCNC: 1.7 MG/DL (ref 1.6–2.6)
MCH RBC QN AUTO: 26 PG (ref 26–35)
MCHC RBC AUTO-ENTMCNC: 30.4 G/DL (ref 32–34.5)
MCV RBC AUTO: 85.5 FL (ref 80–99.9)
MONOCYTES NFR BLD: 0.2 K/UL (ref 0.1–0.95)
MONOCYTES NFR BLD: 4 % (ref 2–12)
MYELOCYTES ABSOLUTE COUNT: 0.04 K/UL
MYELOCYTES: 1 %
NEUTROPHILS NFR BLD: 88 % (ref 43–80)
NEUTS SEG NFR BLD: 4.13 K/UL (ref 1.8–7.3)
NITRITE UR QL STRIP: POSITIVE
PH UR STRIP: 7.5 [PH] (ref 5–9)
PLATELET # BLD AUTO: 165 K/UL (ref 130–450)
PMV BLD AUTO: 12 FL (ref 7–12)
POTASSIUM SERPL-SCNC: 3.4 MMOL/L (ref 3.5–5)
PROT UR STRIP-MCNC: ABNORMAL MG/DL
RBC # BLD AUTO: 3.58 M/UL (ref 3.5–5.5)
RBC # BLD: ABNORMAL 10*6/UL
RBC #/AREA URNS HPF: ABNORMAL /HPF
SODIUM SERPL-SCNC: 144 MMOL/L (ref 132–146)
SP GR UR STRIP: 1.01 (ref 1–1.03)
UROBILINOGEN UR STRIP-ACNC: 0.2 EU/DL (ref 0–1)
WBC #/AREA URNS HPF: ABNORMAL /HPF
WBC OTHER # BLD: 4.7 K/UL (ref 4.5–11.5)

## 2023-09-15 PROCEDURE — 80048 BASIC METABOLIC PNL TOTAL CA: CPT

## 2023-09-15 PROCEDURE — 94640 AIRWAY INHALATION TREATMENT: CPT

## 2023-09-15 PROCEDURE — 93005 ELECTROCARDIOGRAM TRACING: CPT

## 2023-09-15 PROCEDURE — 2060000000 HC ICU INTERMEDIATE R&B

## 2023-09-15 PROCEDURE — 87086 URINE CULTURE/COLONY COUNT: CPT

## 2023-09-15 PROCEDURE — 94660 CPAP INITIATION&MGMT: CPT

## 2023-09-15 PROCEDURE — 99232 SBSQ HOSP IP/OBS MODERATE 35: CPT | Performed by: FAMILY MEDICINE

## 2023-09-15 PROCEDURE — 87088 URINE BACTERIA CULTURE: CPT

## 2023-09-15 PROCEDURE — 82962 GLUCOSE BLOOD TEST: CPT

## 2023-09-15 PROCEDURE — 36415 COLL VENOUS BLD VENIPUNCTURE: CPT

## 2023-09-15 PROCEDURE — 83735 ASSAY OF MAGNESIUM: CPT

## 2023-09-15 PROCEDURE — 2700000000 HC OXYGEN THERAPY PER DAY

## 2023-09-15 PROCEDURE — 6360000002 HC RX W HCPCS: Performed by: FAMILY MEDICINE

## 2023-09-15 PROCEDURE — 2580000003 HC RX 258

## 2023-09-15 PROCEDURE — 6370000000 HC RX 637 (ALT 250 FOR IP)

## 2023-09-15 PROCEDURE — 85025 COMPLETE CBC W/AUTO DIFF WBC: CPT

## 2023-09-15 PROCEDURE — 81001 URINALYSIS AUTO W/SCOPE: CPT

## 2023-09-15 RX ADMIN — SODIUM CHLORIDE, PRESERVATIVE FREE 10 ML: 5 INJECTION INTRAVENOUS at 22:20

## 2023-09-15 RX ADMIN — LEVETIRACETAM 500 MG: 500 TABLET, FILM COATED ORAL at 21:31

## 2023-09-15 RX ADMIN — ARFORMOTEROL TARTRATE 15 MCG: 15 SOLUTION RESPIRATORY (INHALATION) at 19:14

## 2023-09-15 RX ADMIN — DESMOPRESSIN ACETATE 200 MCG: 0.1 TABLET ORAL at 02:28

## 2023-09-15 RX ADMIN — OXYCODONE HYDROCHLORIDE 2.5 MG: 5 TABLET ORAL at 21:30

## 2023-09-15 RX ADMIN — BUDESONIDE INHALATION 500 MCG: 0.5 SUSPENSION RESPIRATORY (INHALATION) at 08:22

## 2023-09-15 RX ADMIN — DESMOPRESSIN ACETATE 200 MCG: 0.1 TABLET ORAL at 21:32

## 2023-09-15 RX ADMIN — LEVOTHYROXINE SODIUM 75 MCG: 0.03 TABLET ORAL at 06:47

## 2023-09-15 RX ADMIN — APIXABAN 5 MG: 5 TABLET, FILM COATED ORAL at 21:32

## 2023-09-15 RX ADMIN — ARFORMOTEROL TARTRATE 15 MCG: 15 SOLUTION RESPIRATORY (INHALATION) at 08:22

## 2023-09-15 RX ADMIN — HYDROXYZINE HYDROCHLORIDE 10 MG: 10 TABLET ORAL at 21:32

## 2023-09-15 RX ADMIN — SODIUM CHLORIDE, PRESERVATIVE FREE 10 ML: 5 INJECTION INTRAVENOUS at 09:52

## 2023-09-15 RX ADMIN — METOPROLOL SUCCINATE 25 MG: 25 TABLET, FILM COATED, EXTENDED RELEASE ORAL at 21:30

## 2023-09-15 RX ADMIN — BUDESONIDE INHALATION 500 MCG: 0.5 SUSPENSION RESPIRATORY (INHALATION) at 19:14

## 2023-09-15 ASSESSMENT — PAIN SCALES - GENERAL
PAINLEVEL_OUTOF10: 0

## 2023-09-15 NOTE — PROGRESS NOTES
Date: 9/15/2023    Time: 8:33 AM    Patient Placed On BIPAP/CPAP/ Non-Invasive Ventilation? Yes    If no must comment. Facial area red/color change? No           If YES are Blister/Lesion present? No   If yes must notify nursing staff  BIPAP/CPAP skin barrier? Yes    Skin barrier type:mepilexlite       Comments:    Upon Respiratory assessment, patient's WOB was increased. Patient was agreeable to wearing the bipap and the patient is NOT restrained at this time. Patient tolerating well.      Gabriel Mac RCP RRT       09/15/23 0832   NIV Type   $NIV $Daily Charge   Ventilator ID [id number]   NIV Started/Stopped (S)  On   Equipment Type v60   Mode AVAPS   Mask Type Full face mask   Mask Size Small   Assessment   Respirations 20   SpO2 95 %   Level of Consciousness 0   Comfort Level Fair   Using Accessory Muscles (S)  Yes   Mask Compliance Good   Skin Assessment Clean, dry, & intact   Skin Protection for O2 Device Yes   Settings/Measurements   PIP Observed 16 cm H20   CPAP/EPAP 5 cmH2O   IPAP Min 10 cmH2O   IPAP Max 25 cmH2O   Vt (Set, mL) 400 mL   Vt (Measured) 491 mL   Rate Ordered 16   Insp Rise Time (%) 2 %   FiO2  50 %   I Time/ I Time % 0.9 s   Minute Volume (L/min) 9.4 Liters   Mask Leak (lpm) 51 lpm   Patient's Home Machine No   Alarm Settings   Alarms On Y

## 2023-09-15 NOTE — CONSULTS
Chart reviewed. , Sushil Amado, at bedside. States he has no interest in palliative medicine or changing code status. He asked that staff stop discussing code status options with him. States they are trying to initiate transfer to [facility]. At this time palliative medicine will sign off due to family request.  Please reconsult if family would like to speak with palliative medicine.     Rob Brtio, FRIDA - CNP  Palliative medicine

## 2023-09-15 NOTE — PLAN OF CARE
Problem: Safety - Medical Restraint  Goal: Remains free of injury from restraints (Restraint for Interference with Medical Device)  Description: INTERVENTIONS:  1. Determine that other, less restrictive measures have been tried or would not be effective before applying the restraint  2. Evaluate the patient's condition at the time of restraint application  3. Inform patient/family regarding the reason for restraint  4.  Q2H: Monitor safety, psychosocial status, comfort, nutrition and hydration  9/15/2023 1330 by Ale Swan RN  Outcome: Completed  9/15/2023 1122 by Safia Paulson RN  Outcome: Progressing  Flowsheets  Taken 9/15/2023 0400 by Kelechi Hua RN  Remains free of injury from restraints (restraint for interference with medical device): Every 2 hours: Monitor safety, psychosocial status, comfort, nutrition and hydration  Taken 9/15/2023 0000 by Kelechi Hua RN  Remains free of injury from restraints (restraint for interference with medical device): Every 2 hours: Monitor safety, psychosocial status, comfort, nutrition and hydration  Taken 9/14/2023 2259 by Fabi Magdaleno RN  Remains free of injury from restraints (restraint for interference with medical device):   Determine that other, less restrictive measures have been tried or would not be effective before applying the restraint   Evaluate the patient's condition at the time of restraint application   Every 2 hours: Monitor safety, psychosocial status, comfort, nutrition and hydration   Inform patient/family regarding the reason for restraint

## 2023-09-15 NOTE — PROGRESS NOTES
Pt refuses meds bi pap mask removed briefly to speak with pt wants bi pap left on refuses meds and meal at supper mask readjusted with 2nd nurse Rico's help

## 2023-09-15 NOTE — CARE COORDINATION
CM Update: Pt and  scheduled to speak with Family Med Team today regarding transfer to tertiary care vs code status change and hospice. She has refused all meds and care. Refusing to wear NIV.  CM will follow up after meeting (TF)    Delfina Wallis, [facility]  Case Management  [phone]

## 2023-09-15 NOTE — PROGRESS NOTES
Date: 9/15/2023    Time: 3:24 PM    Patient Placed On BIPAP/CPAP/ Non-Invasive Ventilation? No, placed on by RN    If no must comment. Facial area red/color change? No           If YES are Blister/Lesion present? No   If yes must notify nursing staff  BIPAP/CPAP skin barrier? No    Skin barrier type: found mepilexlite in mask, RT removed it and put it aside. Patient tends to take off mask herself once more alert. Comments:        Corinne Borrow, RCP RRT       09/15/23 1520   NIV Type   NIV Started/Stopped On  (found on)   Equipment Type v60   Mode AVAPS   Mask Type Full face mask   Mask Size Small   Assessment   Level of Consciousness 0   Comfort Level Fair   Using Accessory Muscles Yes   Mask Compliance Good   Skin Assessment Clean, dry, & intact   Skin Protection for O2 Device No  (FOUND IN MASK, TOOK OUT AND PLACED ASIDE.  TENDS TO TAKE MASK OFF HERSELF.)   Settings/Measurements   PIP Observed 16 cm H20   CPAP/EPAP 5 cmH2O   IPAP Min 10 cmH2O   IPAP Max 22 cmH2O   Vt (Set, mL) 400 mL   Vt (Measured) 398 mL   Rate Ordered 16   FiO2  50 %   I Time/ I Time % 0.9 s   Minute Volume (L/min) 9.3 Liters   Mask Leak (lpm) 47 lpm   Patient's Home Machine No   Alarm Settings   Alarms On Y

## 2023-09-15 NOTE — PROGRESS NOTES
[facility] Inpatient   Resident Progress Note    S:  Hospital day: 2   Brief Synopsis: Indra Alanis is a 78 y.o. female with a PMH of pulmonary sarcoidosis, atrial fibrillation on Eliquis, pulmonary hypertension, diabetes insipidus, hypothyroidism, hypertension, chronic kidney disease stage IIIa and combined systolic/diastolic heart failure who presents to the emergency department for shortness of breath. Patient was discharged from [facility] 2-3 hours earlier after being admitted for 25 days for acute hypoxic respiratory failure episode that also involved patient having a cardiac arrest requiring ICU stay during that admission. This time, patient was sent in from [facility] after complaints of shortness of breath and being noticed with a hypoxia of 79% O2 on her baseline of 6 L nasal cannula. Overnight/interim:   Patient is somnolent and ill-appearing. Patient is on soft restraint due to aggressive behavior; pulling nasal cannula, attempting to kick and punch. Patient refusing BiPAP  Patient's  coming for meeting with primary care team at 11 AM to discuss transfer to tertiary care at [facility].       Cont meds:    sodium chloride       Scheduled meds:    DULoxetine  60 mg Oral Daily    levothyroxine  75 mcg Oral Daily    levETIRAcetam  500 mg Oral BID    omega-3 acid ethyl esters  1 g Oral BID    apixaban  5 mg Oral BID    amiodarone  200 mg Oral Daily    ferrous sulfate  325 mg Oral BID with meals    docusate sodium  100 mg Oral BID    potassium chloride  20 mEq Oral Daily    famotidine  20 mg Oral Daily    predniSONE  5 mg Oral Daily    lidocaine  1 patch TransDERmal Daily    guaiFENesin  400 mg Oral Q8H    amLODIPine  5 mg Oral Daily    desmopressin  200 mcg Oral BID    metoprolol succinate  25 mg Oral BID    furosemide  40 mg Oral Daily    sodium chloride flush  5-40 mL IntraVENous 2 times per day    arformoterol tartrate  15 mcg Nebulization BID RT    And    budesonide  0.5 mg

## 2023-09-15 NOTE — PROGRESS NOTES
RN stopped by respiratory as they attempted to place patient on BiPAP. Pt adamantly refused. Pt nasal cannula was off and O 2 level was 79%. Rn's and Respiratory attempted to reason with patient regarding the need for supplemental O 2, Pt refused to wear nasal cannula and/or BiPAP. Patient became agitated when RN tried to reposition patient and place oxygen on her face. Attempted to kick and punch. Decision was made for the safety of the patient to place her in non-violent wrist restraints. Wrist restraints were applied and nasal cannula was placed on her face. Oxygen level immediately returned to 99%. Provider notified and orders were placed. This RN called Fercho Weber, the patients , to let him know this update.

## 2023-09-15 NOTE — CARE COORDINATION
KAREEN Abrams initiated transfer to tertiary care. Notified access to reach out to Dr. Karol Russo for physician to physician [phone]. Spoke with Dr. Karol Russo, patient was accepted at [facility]. Waiting for bed to become available now. For questions I can be reached at [phone].  Ayla Kumar, [place]

## 2023-09-15 NOTE — ACP (ADVANCE CARE PLANNING)
Advance Care Planning   Healthcare Decision Maker:    Primary Decision Maker: [address] - [phone]    Secondary Decision Maker: Aaron Austin - Child - [phone]    Click here to complete Healthcare Decision Makers including selection of the Healthcare Decision Maker Relationship (ie \"Primary\").           Electronically signed by Kaiden Crowe RN CM 9/15/2023 at 8:50 AM

## 2023-09-15 NOTE — PROGRESS NOTES
Spoke with [facility] triage center pt to transfer when bed available

## 2023-09-16 LAB
ANION GAP SERPL CALCULATED.3IONS-SCNC: 11 MMOL/L (ref 7–16)
BASOPHILS # BLD: 0.03 K/UL (ref 0–0.2)
BASOPHILS NFR BLD: 1 % (ref 0–2)
BUN SERPL-MCNC: 13 MG/DL (ref 6–23)
CALCIUM SERPL-MCNC: 9 MG/DL (ref 8.6–10.2)
CHLORIDE SERPL-SCNC: 103 MMOL/L (ref 98–107)
CO2 SERPL-SCNC: 31 MMOL/L (ref 22–29)
CREAT SERPL-MCNC: 1 MG/DL (ref 0.5–1)
EOSINOPHIL # BLD: 0.21 K/UL (ref 0.05–0.5)
EOSINOPHILS RELATIVE PERCENT: 4 % (ref 0–6)
ERYTHROCYTE [DISTWIDTH] IN BLOOD BY AUTOMATED COUNT: 18.1 % (ref 11.5–15)
GFR SERPL CREATININE-BSD FRML MDRD: >60 ML/MIN/1.73M2
GLUCOSE BLD-MCNC: 84 MG/DL (ref 74–99)
GLUCOSE SERPL-MCNC: 72 MG/DL (ref 74–99)
HCT VFR BLD AUTO: 30.7 % (ref 34–48)
HGB BLD-MCNC: 9.3 G/DL (ref 11.5–15.5)
IMM GRANULOCYTES # BLD AUTO: <0.03 K/UL (ref 0–0.58)
IMM GRANULOCYTES NFR BLD: 0 % (ref 0–5)
LYMPHOCYTES NFR BLD: 0.56 K/UL (ref 1.5–4)
LYMPHOCYTES RELATIVE PERCENT: 11 % (ref 20–42)
MCH RBC QN AUTO: 25.9 PG (ref 26–35)
MCHC RBC AUTO-ENTMCNC: 30.3 G/DL (ref 32–34.5)
MCV RBC AUTO: 85.5 FL (ref 80–99.9)
MONOCYTES NFR BLD: 0.67 K/UL (ref 0.1–0.95)
MONOCYTES NFR BLD: 13 % (ref 2–12)
NEUTROPHILS NFR BLD: 72 % (ref 43–80)
NEUTS SEG NFR BLD: 3.84 K/UL (ref 1.8–7.3)
PLATELET # BLD AUTO: 172 K/UL (ref 130–450)
PMV BLD AUTO: 11.4 FL (ref 7–12)
POTASSIUM SERPL-SCNC: 3.6 MMOL/L (ref 3.5–5)
RBC # BLD AUTO: 3.59 M/UL (ref 3.5–5.5)
SODIUM SERPL-SCNC: 145 MMOL/L (ref 132–146)
WBC OTHER # BLD: 5.3 K/UL (ref 4.5–11.5)

## 2023-09-16 PROCEDURE — 94660 CPAP INITIATION&MGMT: CPT

## 2023-09-16 PROCEDURE — 94640 AIRWAY INHALATION TREATMENT: CPT

## 2023-09-16 PROCEDURE — 2700000000 HC OXYGEN THERAPY PER DAY

## 2023-09-16 PROCEDURE — 6370000000 HC RX 637 (ALT 250 FOR IP)

## 2023-09-16 PROCEDURE — 2580000003 HC RX 258

## 2023-09-16 PROCEDURE — 6360000002 HC RX W HCPCS

## 2023-09-16 PROCEDURE — 85025 COMPLETE CBC W/AUTO DIFF WBC: CPT

## 2023-09-16 PROCEDURE — 87081 CULTURE SCREEN ONLY: CPT

## 2023-09-16 PROCEDURE — 1200000000 HC SEMI PRIVATE

## 2023-09-16 PROCEDURE — 82962 GLUCOSE BLOOD TEST: CPT

## 2023-09-16 PROCEDURE — 6360000002 HC RX W HCPCS: Performed by: FAMILY MEDICINE

## 2023-09-16 PROCEDURE — 36415 COLL VENOUS BLD VENIPUNCTURE: CPT

## 2023-09-16 PROCEDURE — 80048 BASIC METABOLIC PNL TOTAL CA: CPT

## 2023-09-16 RX ORDER — DIPHENHYDRAMINE HYDROCHLORIDE 50 MG/ML
12.5 INJECTION INTRAMUSCULAR; INTRAVENOUS EVERY 6 HOURS PRN
Status: DISCONTINUED | OUTPATIENT
Start: 2023-09-16 | End: 2023-09-17 | Stop reason: HOSPADM

## 2023-09-16 RX ORDER — DIPHENHYDRAMINE HYDROCHLORIDE 50 MG/ML
25 INJECTION INTRAMUSCULAR; INTRAVENOUS EVERY 6 HOURS PRN
Status: DISCONTINUED | OUTPATIENT
Start: 2023-09-16 | End: 2023-09-16

## 2023-09-16 RX ADMIN — FAMOTIDINE 20 MG: 20 TABLET ORAL at 16:27

## 2023-09-16 RX ADMIN — DULOXETINE HYDROCHLORIDE 60 MG: 60 CAPSULE, DELAYED RELEASE ORAL at 16:25

## 2023-09-16 RX ADMIN — OMEGA-3-ACID ETHYL ESTERS 1 G: 1 CAPSULE, LIQUID FILLED ORAL at 23:57

## 2023-09-16 RX ADMIN — ONDANSETRON 4 MG: 2 INJECTION INTRAMUSCULAR; INTRAVENOUS at 00:54

## 2023-09-16 RX ADMIN — ARFORMOTEROL TARTRATE 15 MCG: 15 SOLUTION RESPIRATORY (INHALATION) at 08:47

## 2023-09-16 RX ADMIN — DESMOPRESSIN ACETATE 200 MCG: 0.1 TABLET ORAL at 22:38

## 2023-09-16 RX ADMIN — DOCUSATE SODIUM 100 MG: 100 CAPSULE, LIQUID FILLED ORAL at 21:03

## 2023-09-16 RX ADMIN — SODIUM CHLORIDE, PRESERVATIVE FREE 10 ML: 5 INJECTION INTRAVENOUS at 21:04

## 2023-09-16 RX ADMIN — FUROSEMIDE 40 MG: 40 TABLET ORAL at 16:27

## 2023-09-16 RX ADMIN — ACETAMINOPHEN 650 MG: 325 TABLET ORAL at 16:25

## 2023-09-16 RX ADMIN — FERROUS SULFATE TAB 325 MG (65 MG ELEMENTAL FE) 325 MG: 325 (65 FE) TAB at 16:25

## 2023-09-16 RX ADMIN — OXYCODONE HYDROCHLORIDE 2.5 MG: 5 TABLET ORAL at 17:16

## 2023-09-16 RX ADMIN — BUDESONIDE INHALATION 500 MCG: 0.5 SUSPENSION RESPIRATORY (INHALATION) at 08:47

## 2023-09-16 RX ADMIN — APIXABAN 5 MG: 5 TABLET, FILM COATED ORAL at 21:03

## 2023-09-16 RX ADMIN — AMLODIPINE BESYLATE 5 MG: 5 TABLET ORAL at 16:24

## 2023-09-16 RX ADMIN — LEVETIRACETAM 500 MG: 500 TABLET, FILM COATED ORAL at 21:03

## 2023-09-16 RX ADMIN — GUAIFENESIN 400 MG: 400 TABLET ORAL at 16:25

## 2023-09-16 RX ADMIN — LEVOTHYROXINE SODIUM 75 MCG: 0.03 TABLET ORAL at 05:57

## 2023-09-16 RX ADMIN — GUAIFENESIN 400 MG: 400 TABLET ORAL at 22:41

## 2023-09-16 RX ADMIN — METOPROLOL SUCCINATE 25 MG: 25 TABLET, FILM COATED, EXTENDED RELEASE ORAL at 21:03

## 2023-09-16 RX ADMIN — AMIODARONE HYDROCHLORIDE 200 MG: 200 TABLET ORAL at 16:26

## 2023-09-16 RX ADMIN — BUDESONIDE INHALATION 500 MCG: 0.5 SUSPENSION RESPIRATORY (INHALATION) at 19:50

## 2023-09-16 RX ADMIN — ARFORMOTEROL TARTRATE 15 MCG: 15 SOLUTION RESPIRATORY (INHALATION) at 19:50

## 2023-09-16 RX ADMIN — DIPHENHYDRAMINE HYDROCHLORIDE 25 MG: 50 INJECTION INTRAMUSCULAR; INTRAVENOUS at 01:25

## 2023-09-16 ASSESSMENT — PAIN DESCRIPTION - DESCRIPTORS: DESCRIPTORS: DISCOMFORT;SHARP

## 2023-09-16 ASSESSMENT — PAIN DESCRIPTION - ORIENTATION: ORIENTATION: RIGHT

## 2023-09-16 ASSESSMENT — PAIN DESCRIPTION - PAIN TYPE: TYPE: ACUTE PAIN

## 2023-09-16 ASSESSMENT — PAIN DESCRIPTION - ONSET: ONSET: ON-GOING

## 2023-09-16 ASSESSMENT — PAIN DESCRIPTION - FREQUENCY: FREQUENCY: CONTINUOUS

## 2023-09-16 ASSESSMENT — PAIN - FUNCTIONAL ASSESSMENT: PAIN_FUNCTIONAL_ASSESSMENT: PREVENTS OR INTERFERES SOME ACTIVE ACTIVITIES AND ADLS

## 2023-09-16 ASSESSMENT — PAIN SCALES - GENERAL
PAINLEVEL_OUTOF10: 0
PAINLEVEL_OUTOF10: 8
PAINLEVEL_OUTOF10: 0

## 2023-09-16 ASSESSMENT — PAIN SCALES - WONG BAKER
WONGBAKER_NUMERICALRESPONSE: 0
WONGBAKER_NUMERICALRESPONSE: 0

## 2023-09-16 ASSESSMENT — PAIN DESCRIPTION - LOCATION: LOCATION: CHEST

## 2023-09-16 NOTE — PROGRESS NOTES
Culture in for VRE - Stool. Patient has not had a bowel movement today. Unable to collect culture.     Electronically signed by Yanely Chen RN on 9/16/23 at 1:12 PM EDT

## 2023-09-16 NOTE — CONSULTS
Department of Internal Medicine  Infectious Diseases   Consult Note      Reason for Consult: VRE bacteriuria       Requesting Physician:  Dr Emma Yuen:      Pt is known to ID . Apolonio Fothergill This is a 78 yrs old female with hx of COPD, Sarcoidosis, Pul HTN, C  diff infection , chronic resp failure , recently treated pneumonia ( with zerbaxa ) presented to the ER  on the same day of dicharge ( 9/13) with shortness of breath and cough. She denied fever, chills , chest pain, sputum expectoration .   CTA chest - no PE, compressive atelectasis , mod diffuse bilateral centrilobular emphysema   She has an indwelling Sanchez catheter - denied abdomen pain   Urine cx - in the past grew VRE , Candida tropicalis        Past Medical History:      Past Medical History:   Diagnosis Date    A-fib [facility])     follows with Dr Marion Feldman    Abnormal mammogram 2010    Acute on chronic respiratory failure ([address]) 05/05/2017    Anemia     Anemia due to chronic illness     Ankle fracture, left 2008    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[address]) 03/26/2007    Backache     Benign hypertension     CHF (congestive heart failure) (HCC)     Chronic back pain     Chronic kidney disease     stage 3    Chronic pain disorder     Chronic, continuous use of opioids     Compression fracture of thoracic vertebra (HCC)     T10    COPD (chronic obstructive pulmonary disease) ([address])     Debility     Deformity of ankle and foot, acquired 01/31/2011    Depression     Diabetes insipidus ([address])     Diverticulitis     Dry eyes     Encephalopathy acute 05/05/2017    Gait disturbance     GERD (gastroesophageal reflux disease)     Hemorrhoids 01/13/2012    Hernia     History of blood transfusion approx 05/2017    History of Clostridium difficile     negative culture 12-15-15; resolved    Hyperlipidemia     Hypothyroidism     Ischemic colitis, enteritis, or enterocolitis ([address])     Long term (current) use of systemic steroids 07/10/2022    Long-term current Component Value Date/Time    TSH 1.160 06/15/2023 11:36 AM       U/A:    Lab Results   Component Value Date/Time    NITRITE negative 06/13/2022 03:30 PM    COLORU Yellow 09/15/2023 01:15 PM    PHUR 7.5 09/15/2023 01:15 PM    LABCAST MANY 12/13/2015 12:45 PM    WBCUA 0 TO 5 09/15/2023 01:15 PM    WBCUA 1-3 04/28/2012 08:50 AM    RBCUA 10 TO 20 09/15/2023 01:15 PM    RBCUA NONE 11/20/2013 02:10 PM    YEAST FEW 10/27/2014 03:45 PM    BACTERIA 1+ 09/15/2023 01:15 PM    CLARITYU Clear 06/14/2023 02:28 PM    SPECGRAV 1.015 09/15/2023 01:15 PM    LEUKOCYTESUR TRACE 09/15/2023 01:15 PM    UROBILINOGEN 0.2 09/15/2023 01:15 PM    BILIRUBINUR NEGATIVE 09/15/2023 01:15 PM    BILIRUBINUR negative 06/13/2022 03:30 PM    BILIRUBINUR NEGATIVE 04/28/2012 08:50 AM    BLOODU Negative 06/14/2023 02:28 PM    GLUCOSEU NEGATIVE 09/15/2023 01:15 PM    GLUCOSEU NEGATIVE 04/28/2012 08:50 AM    AMORPHOUS RARE 09/16/2017 01:30 PM       ABG:  No results found for: \"JCP5ATD\", \"BEART\", \"Q0XAZOQY\", \"PHART\", \"THGBART\", \"EAU3BLD\", \"PO2ART\", \"ATS8NLE\"    MICROBIOLOGY:      Radiology :    CTA chest -  Impression:        No convincing evidence for acute pulmonary thromboembolism. Suspect underlying pulmonary arterial hypertension. Moderate diffuse bilateral centrilobular emphysema. Mild compressive atelectasis in posterior right lower lobe and left lower   lobe. Mild cardiac enlargement. 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  Rectal swab for VRE screening     Thank you Dr Britany Frederick for the consult

## 2023-09-16 NOTE — PROGRESS NOTES
Confirmed with Dr Jamison Fresh, stool sample is needed for  VRE screening and not rectal swab as per his notes.

## 2023-09-16 NOTE — PROGRESS NOTES
Nurse to nurse report given to Higinio Yin on 8WE.     Electronically signed by Isabel Aquino RN on 9/16/23 at 1:08 PM EDT

## 2023-09-16 NOTE — PROGRESS NOTES
Patient woke up, calling her sister, yelling and wanting to be out of the bed. Patient put her leg out of the bed and trying to get out. Patient refused to wear her nasal cannula, O2sat went down 82% to 86%. Respiratory came in and asked the patient if she wants the BIPAP on, refused and pulled her nasal cannula. Was able to calm the patient and kept still in the bed. Called Dr. Gina Eaton covering for Dr. Laurelyn Fabry, Benadryl 25 mg. Intravenous was ordered replacing hydroxyzine 10 mg as PRN.

## 2023-09-16 NOTE — PROGRESS NOTES
[place] [facility] Inpatient   Resident Progress Note    S:  Hospital day: 3   Brief Synopsis: Royal Silvestre is a 78 y.o. female with a PMH of pulmonary sarcoidosis, pulmonary HTN, afib on eliquis, diabetes insipidus, hypothyroidism, HTN, CKD IIIa, and combined CHF who was admitted for acute hypoxic respiratory failure. Patient had been discharged from the hospital that same day after a 25 day admission for acute hypoxic respiratory failure. Overnight/interim:   Overnight, patient became combative and was refusing to wear O2 and BiPAP, resulting in desaturations to 82-86%  Patient was refusing PO medication- IV benadryl was ordered as sedative, patient received 1 dose at 0125  When seen this morning, patient was resting comfortably, somnolent but arousable.  Refusing to answer questions       Cont meds:    sodium chloride       Scheduled meds:    DULoxetine  60 mg Oral Daily    levothyroxine  75 mcg Oral Daily    levETIRAcetam  500 mg Oral BID    omega-3 acid ethyl esters  1 g Oral BID    apixaban  5 mg Oral BID    amiodarone  200 mg Oral Daily    ferrous sulfate  325 mg Oral BID with meals    docusate sodium  100 mg Oral BID    potassium chloride  20 mEq Oral Daily    famotidine  20 mg Oral Daily    predniSONE  5 mg Oral Daily    lidocaine  1 patch TransDERmal Daily    guaiFENesin  400 mg Oral Q8H    amLODIPine  5 mg Oral Daily    desmopressin  200 mcg Oral BID    metoprolol succinate  25 mg Oral BID    furosemide  40 mg Oral Daily    sodium chloride flush  5-40 mL IntraVENous 2 times per day    arformoterol tartrate  15 mcg Nebulization BID RT    And    budesonide  0.5 mg Nebulization BID RT     PRN meds: diphenhydrAMINE, albuterol, sodium chloride, albuterol, polyvinyl alcohol, oxyCODONE, sodium chloride flush, sodium chloride, ondansetron **OR** ondansetron, polyethylene glycol, acetaminophen **OR** acetaminophen     I reviewed the patient's past medical and surgical history, Medications and combative/altered and refuses O2- avoid sedating medications that decrease respiratory drive   Patient to transfer to [facility] for further management, waiting for bed availability    AMS  Patient becoming increasingly altered in the evenings, possibly 2/2 UTI  ID consulted given patient's previous hx VRE    CKD  Diabetes insipidus  Hypernatremia resolved  Nephrology following  Continue DDAVP 200 mcg PO BID  Monitor intake/output    Paroxysmal afib  Continue amiodarone, Toprol XL, and Eliquis    Anxiety  Chronic chest pain  Benadryl PRN for anxiety  Oxycodone 2.5 mg PRN  Lidocaine patches for chest pain       DVT/GI ppx: Eliquis/Protonix        Electronically signed by Mary Dugan MD on 9/16/2023 at 7:30 AM  Attending physician: Dr. Adam He

## 2023-09-16 NOTE — PROGRESS NOTES
Patient refused all morning medications. Educated the patient on the importance of taking her medications as they are ordered and then asked again if she would take them and she said no.     Electronically signed by Madhuri Davey RN on 9/16/23 at 12:52 PM EDT

## 2023-09-16 NOTE — PROGRESS NOTES
Received a call from [facility] Vera of [facility] physician's ambulance.  Bed was available in [facility] and patient will be trasferring tomorrow morning around 9am.

## 2023-09-16 NOTE — PROGRESS NOTES
[facility] called. They do not have available bed for the patient yet. They will call again if bed is available.

## 2023-09-17 VITALS
HEIGHT: 62 IN | WEIGHT: 144.5 LBS | SYSTOLIC BLOOD PRESSURE: 112 MMHG | TEMPERATURE: 97.5 F | DIASTOLIC BLOOD PRESSURE: 83 MMHG | RESPIRATION RATE: 22 BRPM | OXYGEN SATURATION: 96 % | BODY MASS INDEX: 26.59 KG/M2 | HEART RATE: 72 BPM

## 2023-09-17 LAB
ANION GAP SERPL CALCULATED.3IONS-SCNC: 11 MMOL/L (ref 7–16)
BASOPHILS # BLD: 0.04 K/UL (ref 0–0.2)
BASOPHILS NFR BLD: 1 % (ref 0–2)
BUN SERPL-MCNC: 20 MG/DL (ref 6–23)
CALCIUM SERPL-MCNC: 8.7 MG/DL (ref 8.6–10.2)
CHLORIDE SERPL-SCNC: 104 MMOL/L (ref 98–107)
CO2 SERPL-SCNC: 30 MMOL/L (ref 22–29)
CREAT SERPL-MCNC: 1.3 MG/DL (ref 0.5–1)
EOSINOPHIL # BLD: 0.2 K/UL (ref 0.05–0.5)
EOSINOPHILS RELATIVE PERCENT: 4 % (ref 0–6)
ERYTHROCYTE [DISTWIDTH] IN BLOOD BY AUTOMATED COUNT: 17.7 % (ref 11.5–15)
GFR SERPL CREATININE-BSD FRML MDRD: 47 ML/MIN/1.73M2
GLUCOSE SERPL-MCNC: 68 MG/DL (ref 74–99)
HCT VFR BLD AUTO: 32.6 % (ref 34–48)
HGB BLD-MCNC: 9.8 G/DL (ref 11.5–15.5)
IMM GRANULOCYTES # BLD AUTO: 0.03 K/UL (ref 0–0.58)
IMM GRANULOCYTES NFR BLD: 1 % (ref 0–5)
LYMPHOCYTES NFR BLD: 0.69 K/UL (ref 1.5–4)
LYMPHOCYTES RELATIVE PERCENT: 13 % (ref 20–42)
MCH RBC QN AUTO: 26.1 PG (ref 26–35)
MCHC RBC AUTO-ENTMCNC: 30.1 G/DL (ref 32–34.5)
MCV RBC AUTO: 86.7 FL (ref 80–99.9)
MICROORGANISM SPEC CULT: ABNORMAL
MONOCYTES NFR BLD: 0.63 K/UL (ref 0.1–0.95)
MONOCYTES NFR BLD: 12 % (ref 2–12)
NEUTROPHILS NFR BLD: 71 % (ref 43–80)
NEUTS SEG NFR BLD: 3.85 K/UL (ref 1.8–7.3)
PLATELET # BLD AUTO: 165 K/UL (ref 130–450)
PMV BLD AUTO: 11.8 FL (ref 7–12)
POTASSIUM SERPL-SCNC: 3.6 MMOL/L (ref 3.5–5)
RBC # BLD AUTO: 3.76 M/UL (ref 3.5–5.5)
SODIUM SERPL-SCNC: 145 MMOL/L (ref 132–146)
SPECIMEN DESCRIPTION: ABNORMAL
WBC OTHER # BLD: 5.4 K/UL (ref 4.5–11.5)

## 2023-09-17 PROCEDURE — 36415 COLL VENOUS BLD VENIPUNCTURE: CPT

## 2023-09-17 PROCEDURE — 94640 AIRWAY INHALATION TREATMENT: CPT

## 2023-09-17 PROCEDURE — 6370000000 HC RX 637 (ALT 250 FOR IP)

## 2023-09-17 PROCEDURE — 6360000002 HC RX W HCPCS: Performed by: FAMILY MEDICINE

## 2023-09-17 PROCEDURE — 2580000003 HC RX 258

## 2023-09-17 PROCEDURE — 80048 BASIC METABOLIC PNL TOTAL CA: CPT

## 2023-09-17 PROCEDURE — 2700000000 HC OXYGEN THERAPY PER DAY

## 2023-09-17 PROCEDURE — 94660 CPAP INITIATION&MGMT: CPT

## 2023-09-17 PROCEDURE — 85025 COMPLETE CBC W/AUTO DIFF WBC: CPT

## 2023-09-17 RX ORDER — ARFORMOTEROL TARTRATE 15 UG/2ML
15 SOLUTION RESPIRATORY (INHALATION)
Qty: 120 ML | Refills: 3 | DISCHARGE
Start: 2023-09-17

## 2023-09-17 RX ORDER — BUDESONIDE 0.5 MG/2ML
0.5 INHALANT ORAL
Qty: 60 EACH | Refills: 3 | DISCHARGE
Start: 2023-09-17

## 2023-09-17 RX ORDER — ALBUTEROL SULFATE 2.5 MG/3ML
2.5 SOLUTION RESPIRATORY (INHALATION) EVERY 6 HOURS PRN
Qty: 120 EACH | Refills: 3 | DISCHARGE
Start: 2023-09-17

## 2023-09-17 RX ORDER — POLYVINYL ALCOHOL 14 MG/ML
1 SOLUTION/ DROPS OPHTHALMIC PRN
Qty: 30 ML | Refills: 4 | DISCHARGE
Start: 2023-09-17 | End: 2023-10-17

## 2023-09-17 RX ADMIN — LEVETIRACETAM 500 MG: 500 TABLET, FILM COATED ORAL at 09:30

## 2023-09-17 RX ADMIN — ARFORMOTEROL TARTRATE 15 MCG: 15 SOLUTION RESPIRATORY (INHALATION) at 07:27

## 2023-09-17 RX ADMIN — PREDNISONE 5 MG: 5 TABLET ORAL at 09:30

## 2023-09-17 RX ADMIN — OMEGA-3-ACID ETHYL ESTERS 1 G: 1 CAPSULE, LIQUID FILLED ORAL at 09:31

## 2023-09-17 RX ADMIN — DESMOPRESSIN ACETATE 200 MCG: 0.1 TABLET ORAL at 09:31

## 2023-09-17 RX ADMIN — FAMOTIDINE 20 MG: 20 TABLET ORAL at 09:30

## 2023-09-17 RX ADMIN — BUDESONIDE INHALATION 500 MCG: 0.5 SUSPENSION RESPIRATORY (INHALATION) at 07:28

## 2023-09-17 RX ADMIN — FERROUS SULFATE TAB 325 MG (65 MG ELEMENTAL FE) 325 MG: 325 (65 FE) TAB at 09:31

## 2023-09-17 RX ADMIN — AMIODARONE HYDROCHLORIDE 200 MG: 200 TABLET ORAL at 09:30

## 2023-09-17 RX ADMIN — GUAIFENESIN 400 MG: 400 TABLET ORAL at 09:30

## 2023-09-17 RX ADMIN — DOCUSATE SODIUM 100 MG: 100 CAPSULE, LIQUID FILLED ORAL at 09:30

## 2023-09-17 RX ADMIN — METOPROLOL SUCCINATE 25 MG: 25 TABLET, FILM COATED, EXTENDED RELEASE ORAL at 09:30

## 2023-09-17 RX ADMIN — LEVOTHYROXINE SODIUM 75 MCG: 0.03 TABLET ORAL at 06:04

## 2023-09-17 RX ADMIN — FUROSEMIDE 40 MG: 40 TABLET ORAL at 09:30

## 2023-09-17 RX ADMIN — SODIUM CHLORIDE, PRESERVATIVE FREE 10 ML: 5 INJECTION INTRAVENOUS at 09:32

## 2023-09-17 RX ADMIN — POTASSIUM CHLORIDE 20 MEQ: 1500 TABLET, EXTENDED RELEASE ORAL at 09:30

## 2023-09-17 RX ADMIN — APIXABAN 5 MG: 5 TABLET, FILM COATED ORAL at 09:30

## 2023-09-17 RX ADMIN — DULOXETINE HYDROCHLORIDE 60 MG: 60 CAPSULE, DELAYED RELEASE ORAL at 09:30

## 2023-09-17 ASSESSMENT — PAIN DESCRIPTION - PAIN TYPE: TYPE: CHRONIC PAIN

## 2023-09-17 ASSESSMENT — PAIN SCALES - WONG BAKER
WONGBAKER_NUMERICALRESPONSE: 0
WONGBAKER_NUMERICALRESPONSE: 0

## 2023-09-17 ASSESSMENT — PAIN SCALES - GENERAL: PAINLEVEL_OUTOF10: 0

## 2023-09-17 ASSESSMENT — PAIN DESCRIPTION - FREQUENCY: FREQUENCY: CONTINUOUS

## 2023-09-17 ASSESSMENT — PAIN DESCRIPTION - ORIENTATION: ORIENTATION: RIGHT

## 2023-09-17 ASSESSMENT — PAIN - FUNCTIONAL ASSESSMENT: PAIN_FUNCTIONAL_ASSESSMENT: PREVENTS OR INTERFERES SOME ACTIVE ACTIVITIES AND ADLS

## 2023-09-17 ASSESSMENT — PAIN DESCRIPTION - DESCRIPTORS: DESCRIPTORS: DISCOMFORT

## 2023-09-17 ASSESSMENT — PAIN DESCRIPTION - LOCATION: LOCATION: CHEST

## 2023-09-17 ASSESSMENT — PAIN DESCRIPTION - ONSET: ONSET: ON-GOING

## 2023-09-17 NOTE — DISCHARGE INSTR - COC
Continuity of Care Form    Patient Name: Deneen Mera   :  1955  MRN:  [id number]    Admit date:  2023  Discharge date:  23    Code Status Order: Full Code   Advance Directives:     Admitting Physician:  Jayson Herman MD  PCP: Louie Vaughn MD    Discharging Nurse: [facility] Unit/Room#: 7278/8813-F  Discharging Unit Phone Number: [phone]    Emergency Contact:   Extended Emergency Contact Information  Primary Emergency Contact: [facility]  Address: [address] [place] [address], [address] Phone: [phone]  Mobile Phone: [phone]  Relation: Spouse  Secondary Emergency Contact: Sherlyn Madden  Mobile Phone: [phone]  Relation: Child    Past Surgical History:  Past Surgical History:   Procedure Laterality Date    ABDOMEN SURGERY      ischemic colitis    COLONOSCOPY      healed colitis    COLONOSCOPY  2014    COLONOSCOPY  2015    severe colitis    COLONOSCOPY  10/24/2016    COLONOSCOPY  2017    ECHO COMPL W DOP COLOR FLOW  2015         ECHO COMPLETE  2013         FRACTURE SURGERY  2010    Tibial right    HEMORRHOID SURGERY  2016    KYPHOSIS SURGERY      For comp.  fx T10    LUMBAR DISC SURGERY      OTHER SURGICAL HISTORY  11    removal r leg external fixator    OTHER SURGICAL HISTORY  2021    Partial Vaginectomy, Endometrial Biopsy    SIGMOIDOSCOPY N/A 3/19/2021    SIGMOIDOSCOPY HEMORRHOID BANDING performed by Kaylin Constantino MD at [facility]      application TSF  7/3/71    UPPER GASTROINTESTINAL ENDOSCOPY  2016    UPPER GASTROINTESTINAL ENDOSCOPY  2017       Immunization History:   Immunization History   Administered Date(s) Administered    COVID-19, MODERNA BLUE border, Primary or Immunocompromised, (age 12y+), IM, 100 mcg/0.5mL 2021, 2021, 2022    COVID-19, MODERNA Bivalent, (age 12y+), IM, 48 Weight:   Wt Readings from Last 1 Encounters:   09/14/23 144 lb 8 oz (65.5 kg)     Mental Status:  disoriented    IV Access:  - Peripheral IV - site  L Antecubital, insertion date: 9/13/23    Nursing Mobility/ADLs:  Walking   Dependent  Transfer  Assisted  Bathing  Dependent  Dressing  Assisted  Toileting  Assisted  Feeding  Independent  Med Admin  Assisted  Med Delivery   prefers mixed with pudding    Wound Care Documentation and Therapy:        Elimination:  Continence: Bowel: No  Bladder: No  Urinary Catheter: Insertion Date: 9/15/23    Colostomy/Ileostomy/Ileal Conduit: No       Date of Last BM: 9/16/23      Intake/Output Summary (Last 24 hours) at 9/17/2023 0810  Last data filed at 9/17/2023 0610  Gross per 24 hour   Intake 100 ml   Output 350 ml   Net -250 ml     I/O last 3 completed shifts: In: 100 [P.O.:100]  Out: 750 [Urine:750]    Safety Concerns: At Risk for [facility] for aspiration    Impairments/Disabilities:      None    Nutrition Therapy:  Current Nutrition Therapy:   - Oral Diet:  Dysphagia - Pureed    Routes of Feeding: Oral  Liquids: Honey Thick Liquids  Daily Fluid Restriction: no  Last Modified Barium Swallow with Video (Video Swallowing Test): not done    Treatments at the Time of Hospital Discharge:   Respiratory Treatments: HFNC  Oxygen Therapy:  is on oxygen at 10 L/min per nasal cannula.   Ventilator:    - CPAP   only when sleeping    Rehab Therapies: Physical Therapy and Occupational Therapy  Weight Bearing Status/Restrictions: No weight bearing restrictions  Other Medical Equipment (for information only, NOT a DME order):  wheelchair and hospital bed  Other Treatments: No    Patient's personal belongings (please select all that are sent with patient):  Clothing, handphone x1,  with cable x1    RN SIGNATURE:  Electronically signed by Mary Gil RN on 9/17/23 at 8:38 AM EDT    CASE MANAGEMENT/SOCIAL WORK SECTION    Inpatient Status Date: ***    Readmission Risk

## 2023-09-17 NOTE — PROGRESS NOTES
09/16/23 2003   NIV Type   NIV Started/Stopped On   Equipment Type v60   Mode AVAPS   Mask Type Full face mask   Assessment   Level of Consciousness 0   Comfort Level Fair   Using Accessory Muscles Yes   Mask Compliance Good   Skin Assessment Clean, dry, & intact   Skin Protection for O2 Device Yes   Orientation Middle   Location Nose   Settings/Measurements   PIP Observed 16 cm H20   CPAP/EPAP 8 cmH2O   IPAP Min 10 cmH2O   IPAP Max 22 cmH2O   Vt (Set, mL) 400 mL   Vt (Measured) 307 mL   Rate Ordered 16   Insp Rise Time (%) 3 %   FiO2  50 %   I Time/ I Time % 0.9 s   Minute Volume (L/min) 5.4 Liters   Mask Leak (lpm) 45 lpm     Date: 9/16/2023    Time: 8:05 PM    Patient Placed On BIPAP/CPAP/ Non-Invasive Ventilation? Yes    If no must comment. Facial area red/color change? No           If YES are Blister/Lesion present? No   If yes must notify nursing staff  BIPAP/CPAP skin barrier? Yes    Skin barrier type:mepilexlite       Comments:  Mepilex placed on nose. Patient placed on BiPAP.  No issues      Vandana Taylor RCP

## 2023-09-17 NOTE — DISCHARGE INSTRUCTIONS
=====================================  [facility], [address]  =====================================    Take your medications as directed in this summary    Future scheduled appointments are listed below or recommended appointments are mentioned above. Future Appointments   Date Time Provider [address]   9/19/2023  1:45 PM Edwin Vizcaino MD [facility]   9/29/2023  9:30 AM [facility] ROOM 1 [facility]   11/20/2023  2:20 PM Heidy Fuller MD [address] [facility]       Call [phone] to confirm or schedule your appointment with Dr. Reema Youssef after your discharge from the hospital.     It is important that you follow up with Dr. Ivette Nicole for better monitoring of the reason of your hospitalization. .    Once discharged from [facility], you can :     Return to work : Yes, you may return to work  Activity : As tolerated  Stairs : As tolerated  Exercise : As tolerated  Lifting : As tolerated   Sexual activity : Yes  Driving : With seat belt on. NO driving on narcotic pain medication if prescribed   Medications : Always take your medications as prescribed  Wound Care: none needed  Diet : You are asked to make an attempt to improve diet and exercise patterns to aid in medical management of your medical condition/problem. Call [facility] with any further questions. Return to Emergency Department with any worsening of your condition and/or fever greater than 101 degrees, new weakness, shortness of breath or chest pain.

## 2023-09-17 NOTE — PLAN OF CARE
Problem: Discharge Planning  Goal: Discharge to home or other facility with appropriate resources  9/17/2023 1134 by Black Hickey RN  Outcome: Progressing     Problem: Safety - Adult  Goal: Free from fall injury  9/17/2023 1134 by Black Hickey RN  Outcome: Progressing     Problem: Pain  Goal: Verbalizes/displays adequate comfort level or baseline comfort level  9/17/2023 1134 by Black Hickey RN  Outcome: Progressing     Problem: Skin/Tissue Integrity  Goal: Absence of new skin breakdown  Description: 1. Monitor for areas of redness and/or skin breakdown  2. Assess vascular access sites hourly  3. Every 4-6 hours minimum:  Change oxygen saturation probe site  4. Every 4-6 hours:  If on nasal continuous positive airway pressure, respiratory therapy assess nares and determine need for appliance change or resting period.   9/17/2023 1134 by Black Hickey RN  Outcome: Progressing     Problem: ABCDS Injury Assessment  Goal: Absence of physical injury  9/17/2023 1134 by Black Hickey RN  Outcome: Progressing     Problem: Chronic Conditions and Co-morbidities  Goal: Patient's chronic conditions and co-morbidity symptoms are monitored and maintained or improved  9/17/2023 1134 by Black Hickey RN  Outcome: Progressing

## 2023-09-17 NOTE — PLAN OF CARE
Problem: Discharge Planning  Goal: Discharge to home or other facility with appropriate resources  Outcome: Progressing  Flowsheets (Taken 9/16/2023 0930 by Aury Mccullough RN)  Discharge to home or other facility with appropriate resources: Identify barriers to discharge with patient and caregiver     Problem: Safety - Adult  Goal: Free from fall injury  Outcome: Progressing     Problem: Pain  Goal: Verbalizes/displays adequate comfort level or baseline comfort level  Outcome: Progressing     Problem: Skin/Tissue Integrity  Goal: Absence of new skin breakdown  Description: 1. Monitor for areas of redness and/or skin breakdown  2. Assess vascular access sites hourly  3. Every 4-6 hours minimum:  Change oxygen saturation probe site  4. Every 4-6 hours:  If on nasal continuous positive airway pressure, respiratory therapy assess nares and determine need for appliance change or resting period.   Outcome: Progressing     Problem: ABCDS Injury Assessment  Goal: Absence of physical injury  Outcome: Progressing     Problem: Chronic Conditions and Co-morbidities  Goal: Patient's chronic conditions and co-morbidity symptoms are monitored and maintained or improved  Outcome: Progressing  Flowsheets (Taken 9/16/2023 0930 by Aruy Mccullough RN)  Care Plan - Patient's Chronic Conditions and Co-Morbidity Symptoms are Monitored and Maintained or Improved: Monitor and assess patient's chronic conditions and comorbid symptoms for stability, deterioration, or improvement

## 2023-09-21 LAB
MICROORGANISM SPEC CULT: ABNORMAL
SPECIMEN DESCRIPTION: ABNORMAL

## 2023-09-22 LAB
EKG ATRIAL RATE: 77 BPM
EKG P AXIS: 63 DEGREES
EKG P-R INTERVAL: 130 MS
EKG Q-T INTERVAL: 422 MS
EKG QRS DURATION: 94 MS
EKG QTC CALCULATION (BAZETT): 477 MS
EKG R AXIS: 148 DEGREES
EKG T AXIS: 153 DEGREES
EKG VENTRICULAR RATE: 77 BPM

## 2023-09-29 ENCOUNTER — HOSPITAL ENCOUNTER (OUTPATIENT)
Dept: OTHER | Age: 68
Setting detail: THERAPIES SERIES
Discharge: HOME | End: 2023-09-29

## 2023-09-29 NOTE — PROGRESS NOTES
Patient a no show for today's appointment despite reminder call. Called patient and left voicemail requesting return call to reschedule.

## 2023-09-29 NOTE — PLAN OF CARE
Problem: Chronic Conditions and Co-morbidities  Goal: Patient's chronic conditions and co-morbidity symptoms are monitored and maintained or improved  Flowsheets (Taken 9/29/2023 2339)  Care Plan - Patient's Chronic Conditions and Co-Morbidity Symptoms are Monitored and Maintained or Improved: Monitor and assess patient's chronic conditions and comorbid symptoms for stability, deterioration, or improvement

## 2023-10-09 NOTE — TELEPHONE ENCOUNTER
-- DO NOT REPLY / DO NOT REPLY ALL --  -- Message is from Engagement Center Operations (ECO) --    Offered Waitlist if Available for the Visit Type? Yes    Caller is requesting an appointment - at a sooner time than what was available.      Caller wants sooner appointment - offered other approved options    Reason for Visit: Hospital follow up- discharged 10/10    Is the patient currently scheduled? No    Preferred time to be seen: within 7 days         Alternative phone number: NO    Can a detailed message be left? Yes    Message Turnaround: [place]:    Refer to site's KB page for routing instructions    Please give this turnaround time to the caller:   \"You can expect to receive a response 2-3 business days after your provider's clinical team reviews the message\"   LVMTRC : Sputum culture positive and should complete outpatient course of oral antibiotics. Planning for levaquin 750mg by mouth every other day based on GFR. Advised to call back to on-call service to discuss. Called 3 nubers listed, left 2 vm, other unable.

## 2023-10-19 LAB
BUN BLDV-MCNC: NORMAL MG/DL
CALCIUM SERPL-MCNC: NORMAL MG/DL
CHLORIDE BLD-SCNC: NORMAL MMOL/L
CO2: NORMAL
CREAT SERPL-MCNC: NORMAL MG/DL
EGFR: NORMAL
GLUCOSE BLD-MCNC: NORMAL MG/DL
POTASSIUM SERPL-SCNC: NORMAL MMOL/L
SODIUM BLD-SCNC: NORMAL MMOL/L

## 2023-10-23 ENCOUNTER — OFFICE VISIT (OUTPATIENT)
Dept: FAMILY MEDICINE CLINIC | Age: 68
End: 2023-10-23
Payer: MEDICARE

## 2023-10-23 DIAGNOSIS — Z91.89 AT RISK FOR POLYPHARMACY: ICD-10-CM

## 2023-10-23 DIAGNOSIS — E61.1 IRON DEFICIENCY: ICD-10-CM

## 2023-10-23 DIAGNOSIS — I10 PRIMARY HYPERTENSION: ICD-10-CM

## 2023-10-23 DIAGNOSIS — E03.9 HYPOTHYROIDISM, UNSPECIFIED TYPE: ICD-10-CM

## 2023-10-23 DIAGNOSIS — R32 URINARY INCONTINENCE, UNSPECIFIED TYPE: ICD-10-CM

## 2023-10-23 DIAGNOSIS — K21.9 GASTROESOPHAGEAL REFLUX DISEASE WITHOUT ESOPHAGITIS: ICD-10-CM

## 2023-10-23 DIAGNOSIS — G89.4 CHRONIC PAIN SYNDROME: ICD-10-CM

## 2023-10-23 DIAGNOSIS — J96.11 CHRONIC RESPIRATORY FAILURE WITH HYPOXIA (HCC): ICD-10-CM

## 2023-10-23 DIAGNOSIS — I82.90 ACUTE DEEP VEIN THROMBOSIS (DVT) OF NON-EXTREMITY VEIN: ICD-10-CM

## 2023-10-23 DIAGNOSIS — R56.9 SEIZURE (HCC): ICD-10-CM

## 2023-10-23 DIAGNOSIS — I50.33 ACUTE ON CHRONIC DIASTOLIC CHF (CONGESTIVE HEART FAILURE) (HCC): Primary | ICD-10-CM

## 2023-10-23 PROCEDURE — 3017F COLORECTAL CA SCREEN DOC REV: CPT

## 2023-10-23 PROCEDURE — 0509F URINE INCON PLAN DOCD: CPT

## 2023-10-23 PROCEDURE — 1090F PRES/ABSN URINE INCON ASSESS: CPT

## 2023-10-23 PROCEDURE — 99213 OFFICE O/P EST LOW 20 MIN: CPT

## 2023-10-23 PROCEDURE — 1123F ACP DISCUSS/DSCN MKR DOCD: CPT

## 2023-10-23 PROCEDURE — 3074F SYST BP LT 130 MM HG: CPT

## 2023-10-23 PROCEDURE — 3078F DIAST BP <80 MM HG: CPT

## 2023-10-23 PROCEDURE — G8417 CALC BMI ABV UP PARAM F/U: HCPCS

## 2023-10-23 PROCEDURE — G8484 FLU IMMUNIZE NO ADMIN: HCPCS

## 2023-10-23 PROCEDURE — G8427 DOCREV CUR MEDS BY ELIG CLIN: HCPCS

## 2023-10-23 PROCEDURE — G8399 PT W/DXA RESULTS DOCUMENT: HCPCS

## 2023-10-23 PROCEDURE — 1036F TOBACCO NON-USER: CPT

## 2023-10-23 RX ORDER — FAMOTIDINE 20 MG/1
20 TABLET, FILM COATED ORAL DAILY
Qty: 60 TABLET | Refills: 3 | Status: SHIPPED | OUTPATIENT
Start: 2023-10-23

## 2023-10-23 RX ORDER — SILDENAFIL CITRATE 20 MG/1
10 TABLET ORAL 3 TIMES DAILY
COMMUNITY

## 2023-10-23 RX ORDER — AMLODIPINE BESYLATE 5 MG/1
5 TABLET ORAL DAILY
Qty: 30 TABLET | Refills: 3 | Status: SHIPPED | OUTPATIENT
Start: 2023-10-23

## 2023-10-23 RX ORDER — APIXABAN 5 MG/1
5 TABLET, FILM COATED ORAL 2 TIMES DAILY
Qty: 60 TABLET | Refills: 0 | Status: SHIPPED | OUTPATIENT
Start: 2023-10-23 | End: 2023-11-22

## 2023-10-23 RX ORDER — ESCITALOPRAM OXALATE 10 MG/1
10 TABLET ORAL DAILY
Qty: 30 TABLET | Refills: 3 | Status: SHIPPED | OUTPATIENT
Start: 2023-10-23

## 2023-10-23 RX ORDER — GUAIFENESIN AND CODEINE PHOSPHATE 100; 10 MG/5ML; MG/5ML
5 SOLUTION ORAL 2 TIMES DAILY PRN
Qty: 1 EACH | Refills: 0 | Status: SHIPPED | OUTPATIENT
Start: 2023-10-23 | End: 2023-11-22

## 2023-10-23 RX ORDER — DULOXETIN HYDROCHLORIDE 60 MG/1
60 CAPSULE, DELAYED RELEASE ORAL DAILY
Qty: 30 CAPSULE | Refills: 1 | Status: SHIPPED | OUTPATIENT
Start: 2023-10-23

## 2023-10-23 RX ORDER — DESMOPRESSIN ACETATE 0.2 MG/1
0.2 TABLET ORAL 2 TIMES DAILY
Qty: 60 TABLET | Refills: 2 | Status: SHIPPED | OUTPATIENT
Start: 2023-10-23 | End: 2024-02-20

## 2023-10-23 RX ORDER — BUDESONIDE 0.5 MG/2ML
0.5 INHALANT ORAL
Qty: 60 EACH | Refills: 3 | Status: SHIPPED | OUTPATIENT
Start: 2023-10-23

## 2023-10-23 RX ORDER — FERROUS SULFATE 325(65) MG
325 TABLET ORAL 2 TIMES DAILY
Qty: 60 TABLET | Refills: 5 | Status: SHIPPED | OUTPATIENT
Start: 2023-10-23

## 2023-10-23 RX ORDER — LEVOTHYROXINE SODIUM 0.07 MG/1
75 TABLET ORAL DAILY
Qty: 90 TABLET | Refills: 0 | Status: SHIPPED | OUTPATIENT
Start: 2023-10-23

## 2023-10-23 RX ORDER — FUROSEMIDE 20 MG/1
40 TABLET ORAL DAILY
Qty: 60 TABLET | Refills: 0 | Status: SHIPPED | OUTPATIENT
Start: 2023-10-23 | End: 2023-11-22

## 2023-10-23 RX ORDER — LEVETIRACETAM 500 MG/1
500 TABLET ORAL 2 TIMES DAILY
Qty: 60 TABLET | Refills: 3 | Status: SHIPPED | OUTPATIENT
Start: 2023-10-23

## 2023-10-23 RX ORDER — ARFORMOTEROL TARTRATE 15 UG/2ML
15 SOLUTION RESPIRATORY (INHALATION)
Qty: 120 ML | Refills: 3 | Status: SHIPPED | OUTPATIENT
Start: 2023-10-23

## 2023-10-23 RX ORDER — METOPROLOL SUCCINATE 25 MG/1
25 TABLET, EXTENDED RELEASE ORAL 2 TIMES DAILY
Qty: 60 TABLET | Refills: 1 | Status: SHIPPED | OUTPATIENT
Start: 2023-10-23

## 2023-10-23 RX ORDER — ALBUTEROL SULFATE 2.5 MG/3ML
2.5 SOLUTION RESPIRATORY (INHALATION) EVERY 6 HOURS PRN
Qty: 120 EACH | Refills: 3 | Status: SHIPPED | OUTPATIENT
Start: 2023-10-23

## 2023-10-24 VITALS
OXYGEN SATURATION: 99 % | SYSTOLIC BLOOD PRESSURE: 107 MMHG | HEART RATE: 66 BPM | TEMPERATURE: 97.1 F | DIASTOLIC BLOOD PRESSURE: 56 MMHG

## 2023-10-26 DIAGNOSIS — E55.9 VITAMIN D DEFICIENCY: Primary | ICD-10-CM

## 2023-10-26 DIAGNOSIS — R07.9 CHEST PAIN, UNSPECIFIED TYPE: ICD-10-CM

## 2023-10-26 DIAGNOSIS — E87.6 POTASSIUM (K) DEFICIENCY: ICD-10-CM

## 2023-10-26 RX ORDER — MULTIVITAMIN
1 TABLET ORAL DAILY
Qty: 30 TABLET | Refills: 3 | Status: SHIPPED | OUTPATIENT
Start: 2023-10-26 | End: 2024-04-23

## 2023-10-26 RX ORDER — MELATONIN
1000 DAILY
Qty: 90 TABLET | Refills: 1 | Status: SHIPPED | OUTPATIENT
Start: 2023-10-26

## 2023-10-26 RX ORDER — POTASSIUM CHLORIDE 20 MEQ/1
20 TABLET, EXTENDED RELEASE ORAL DAILY
Qty: 90 TABLET | Refills: 0 | Status: SHIPPED | OUTPATIENT
Start: 2023-10-26 | End: 2024-01-24

## 2023-10-26 RX ORDER — OXYCODONE HYDROCHLORIDE 5 MG/1
2.5 TABLET ORAL EVERY 6 HOURS PRN
Qty: 45 TABLET | Refills: 0 | OUTPATIENT
Start: 2023-10-26 | End: 2023-11-25

## 2023-10-26 NOTE — TELEPHONE ENCOUNTER
Culture Follow-up Informed patient that I would no longer be refilling her oxycodone. Informed patient of other options for pain managmenet. Patient agreeable to plan, verbalized understanding.

## 2023-10-26 NOTE — TELEPHONE ENCOUNTER
Last Appointment:  10/23/2023  Future Appointments   Date Time Provider [address]   11/20/2023  2:20 PM Dianne Amaral MD [facility]   11/29/2023  2:20 PM [facility]      Patient is also wanting a multi-vitamin and Vitamin d sent to pharmacy.     Please advise

## 2023-11-10 ENCOUNTER — APPOINTMENT (OUTPATIENT)
Dept: GENERAL RADIOLOGY | Age: 68
DRG: 683 | End: 2023-11-10
Payer: MEDICARE

## 2023-11-10 ENCOUNTER — APPOINTMENT (OUTPATIENT)
Dept: CT IMAGING | Age: 68
DRG: 683 | End: 2023-11-10
Payer: MEDICARE

## 2023-11-10 ENCOUNTER — HOSPITAL ENCOUNTER (INPATIENT)
Age: 68
LOS: 6 days | Discharge: HOME HEALTH | DRG: 683 | End: 2023-11-16
Attending: EMERGENCY MEDICINE | Admitting: FAMILY MEDICINE
Payer: MEDICARE

## 2023-11-10 DIAGNOSIS — N30.00 ACUTE CYSTITIS WITHOUT HEMATURIA: ICD-10-CM

## 2023-11-10 DIAGNOSIS — E61.1 IRON DEFICIENCY: ICD-10-CM

## 2023-11-10 DIAGNOSIS — R53.1 GENERALIZED WEAKNESS: Primary | ICD-10-CM

## 2023-11-10 LAB
ALBUMIN SERPL-MCNC: 3.9 G/DL (ref 3.5–5.2)
ALP SERPL-CCNC: 85 U/L (ref 35–104)
ALT SERPL-CCNC: 9 U/L (ref 0–32)
ANION GAP SERPL CALCULATED.3IONS-SCNC: 7 MMOL/L (ref 7–16)
AST SERPL-CCNC: 25 U/L (ref 0–31)
BACTERIA URNS QL MICRO: ABNORMAL
BASOPHILS # BLD: 0.04 K/UL (ref 0–0.2)
BASOPHILS NFR BLD: 1 % (ref 0–2)
BILIRUB SERPL-MCNC: 0.3 MG/DL (ref 0–1.2)
BILIRUB UR QL STRIP: NEGATIVE
BNP SERPL-MCNC: 160 PG/ML (ref 0–125)
BUN SERPL-MCNC: 15 MG/DL (ref 6–23)
CALCIUM SERPL-MCNC: 10 MG/DL (ref 8.6–10.2)
CHLORIDE SERPL-SCNC: 99 MMOL/L (ref 98–107)
CLARITY UR: CLEAR
CO2 SERPL-SCNC: 38 MMOL/L (ref 22–29)
COLOR UR: YELLOW
CREAT SERPL-MCNC: 1.4 MG/DL (ref 0.5–1)
EOSINOPHIL # BLD: 0.43 K/UL (ref 0.05–0.5)
EOSINOPHILS RELATIVE PERCENT: 10 % (ref 0–6)
ERYTHROCYTE [DISTWIDTH] IN BLOOD BY AUTOMATED COUNT: 14.7 % (ref 11.5–15)
GFR SERPL CREATININE-BSD FRML MDRD: 43 ML/MIN/1.73M2
GLUCOSE SERPL-MCNC: 106 MG/DL (ref 74–99)
GLUCOSE UR STRIP-MCNC: NEGATIVE MG/DL
HCT VFR BLD AUTO: 41.1 % (ref 34–48)
HGB BLD-MCNC: 12.2 G/DL (ref 11.5–15.5)
HGB UR QL STRIP.AUTO: NEGATIVE
IMM GRANULOCYTES # BLD AUTO: <0.03 K/UL (ref 0–0.58)
IMM GRANULOCYTES NFR BLD: 0 % (ref 0–5)
KETONES UR STRIP-MCNC: NEGATIVE MG/DL
LACTATE BLDV-SCNC: 1.4 MMOL/L (ref 0.5–2.2)
LEUKOCYTE ESTERASE UR QL STRIP: ABNORMAL
LIPASE SERPL-CCNC: 25 U/L (ref 13–60)
LYMPHOCYTES NFR BLD: 1.06 K/UL (ref 1.5–4)
LYMPHOCYTES RELATIVE PERCENT: 24 % (ref 20–42)
MAGNESIUM SERPL-MCNC: 1.9 MG/DL (ref 1.6–2.6)
MCH RBC QN AUTO: 25.6 PG (ref 26–35)
MCHC RBC AUTO-ENTMCNC: 29.7 G/DL (ref 32–34.5)
MCV RBC AUTO: 86.3 FL (ref 80–99.9)
MONOCYTES NFR BLD: 0.65 K/UL (ref 0.1–0.95)
MONOCYTES NFR BLD: 15 % (ref 2–12)
NEUTROPHILS NFR BLD: 51 % (ref 43–80)
NEUTS SEG NFR BLD: 2.26 K/UL (ref 1.8–7.3)
NITRITE UR QL STRIP: POSITIVE
PH UR STRIP: 6 [PH] (ref 5–9)
PLATELET # BLD AUTO: 163 K/UL (ref 130–450)
PMV BLD AUTO: 12 FL (ref 7–12)
POTASSIUM SERPL-SCNC: 3.5 MMOL/L (ref 3.5–5)
PROCALCITONIN SERPL-MCNC: 0.07 NG/ML (ref 0–0.08)
PROT SERPL-MCNC: 6.8 G/DL (ref 6.4–8.3)
PROT UR STRIP-MCNC: NEGATIVE MG/DL
RBC # BLD AUTO: 4.76 M/UL (ref 3.5–5.5)
RBC #/AREA URNS HPF: ABNORMAL /HPF
SODIUM SERPL-SCNC: 144 MMOL/L (ref 132–146)
SP GR UR STRIP: 1.02 (ref 1–1.03)
TROPONIN I SERPL HS-MCNC: 18 NG/L (ref 0–9)
TROPONIN I SERPL HS-MCNC: 19 NG/L (ref 0–9)
UROBILINOGEN UR STRIP-ACNC: 0.2 EU/DL (ref 0–1)
WBC #/AREA URNS HPF: ABNORMAL /HPF
WBC OTHER # BLD: 4.5 K/UL (ref 4.5–11.5)

## 2023-11-10 PROCEDURE — 96374 THER/PROPH/DIAG INJ IV PUSH: CPT

## 2023-11-10 PROCEDURE — 93005 ELECTROCARDIOGRAM TRACING: CPT

## 2023-11-10 PROCEDURE — 2580000003 HC RX 258

## 2023-11-10 PROCEDURE — 6360000002 HC RX W HCPCS

## 2023-11-10 PROCEDURE — 85025 COMPLETE CBC W/AUTO DIFF WBC: CPT

## 2023-11-10 PROCEDURE — 83690 ASSAY OF LIPASE: CPT

## 2023-11-10 PROCEDURE — 84540 ASSAY OF URINE/UREA-N: CPT

## 2023-11-10 PROCEDURE — 1200000000 HC SEMI PRIVATE

## 2023-11-10 PROCEDURE — 83880 ASSAY OF NATRIURETIC PEPTIDE: CPT

## 2023-11-10 PROCEDURE — 87086 URINE CULTURE/COLONY COUNT: CPT

## 2023-11-10 PROCEDURE — 80053 COMPREHEN METABOLIC PANEL: CPT

## 2023-11-10 PROCEDURE — 82533 TOTAL CORTISOL: CPT

## 2023-11-10 PROCEDURE — 6360000004 HC RX CONTRAST MEDICATION: Performed by: RADIOLOGY

## 2023-11-10 PROCEDURE — 83735 ASSAY OF MAGNESIUM: CPT

## 2023-11-10 PROCEDURE — 84145 PROCALCITONIN (PCT): CPT

## 2023-11-10 PROCEDURE — 74177 CT ABD & PELVIS W/CONTRAST: CPT

## 2023-11-10 PROCEDURE — 70450 CT HEAD/BRAIN W/O DYE: CPT

## 2023-11-10 PROCEDURE — 84484 ASSAY OF TROPONIN QUANT: CPT

## 2023-11-10 PROCEDURE — 83605 ASSAY OF LACTIC ACID: CPT

## 2023-11-10 PROCEDURE — 71045 X-RAY EXAM CHEST 1 VIEW: CPT

## 2023-11-10 PROCEDURE — 87077 CULTURE AEROBIC IDENTIFY: CPT

## 2023-11-10 PROCEDURE — 87040 BLOOD CULTURE FOR BACTERIA: CPT

## 2023-11-10 PROCEDURE — 6370000000 HC RX 637 (ALT 250 FOR IP)

## 2023-11-10 PROCEDURE — 82570 ASSAY OF URINE CREATININE: CPT

## 2023-11-10 PROCEDURE — 99285 EMERGENCY DEPT VISIT HI MDM: CPT

## 2023-11-10 PROCEDURE — 81001 URINALYSIS AUTO W/SCOPE: CPT

## 2023-11-10 PROCEDURE — 84443 ASSAY THYROID STIM HORMONE: CPT

## 2023-11-10 RX ORDER — AMLODIPINE BESYLATE 5 MG/1
5 TABLET ORAL DAILY
Status: DISCONTINUED | OUTPATIENT
Start: 2023-11-11 | End: 2023-11-16 | Stop reason: HOSPADM

## 2023-11-10 RX ORDER — ESCITALOPRAM OXALATE 10 MG/1
10 TABLET ORAL DAILY
Status: DISCONTINUED | OUTPATIENT
Start: 2023-11-11 | End: 2023-11-16 | Stop reason: HOSPADM

## 2023-11-10 RX ORDER — SILDENAFIL CITRATE 20 MG/1
10 TABLET ORAL 3 TIMES DAILY
Status: DISCONTINUED | OUTPATIENT
Start: 2023-11-10 | End: 2023-11-16 | Stop reason: HOSPADM

## 2023-11-10 RX ORDER — BUDESONIDE 0.5 MG/2ML
0.5 INHALANT ORAL
Status: DISCONTINUED | OUTPATIENT
Start: 2023-11-10 | End: 2023-11-16 | Stop reason: HOSPADM

## 2023-11-10 RX ORDER — PANTOPRAZOLE SODIUM 40 MG/1
40 TABLET, DELAYED RELEASE ORAL
Status: DISCONTINUED | OUTPATIENT
Start: 2023-11-11 | End: 2023-11-16 | Stop reason: HOSPADM

## 2023-11-10 RX ORDER — SODIUM CHLORIDE 9 MG/ML
INJECTION, SOLUTION INTRAVENOUS PRN
Status: DISCONTINUED | OUTPATIENT
Start: 2023-11-10 | End: 2023-11-16 | Stop reason: HOSPADM

## 2023-11-10 RX ORDER — ACETAMINOPHEN 650 MG/1
650 SUPPOSITORY RECTAL EVERY 6 HOURS PRN
Status: DISCONTINUED | OUTPATIENT
Start: 2023-11-10 | End: 2023-11-16 | Stop reason: HOSPADM

## 2023-11-10 RX ORDER — POLYETHYLENE GLYCOL 3350 17 G/17G
17 POWDER, FOR SOLUTION ORAL DAILY PRN
Status: DISCONTINUED | OUTPATIENT
Start: 2023-11-10 | End: 2023-11-16 | Stop reason: HOSPADM

## 2023-11-10 RX ORDER — SODIUM CHLORIDE 0.9 % (FLUSH) 0.9 %
5-40 SYRINGE (ML) INJECTION PRN
Status: DISCONTINUED | OUTPATIENT
Start: 2023-11-10 | End: 2023-11-16 | Stop reason: HOSPADM

## 2023-11-10 RX ORDER — LEVETIRACETAM 500 MG/1
500 TABLET ORAL 2 TIMES DAILY
Status: DISCONTINUED | OUTPATIENT
Start: 2023-11-10 | End: 2023-11-16 | Stop reason: HOSPADM

## 2023-11-10 RX ORDER — FUROSEMIDE 40 MG/1
40 TABLET ORAL DAILY
Status: DISCONTINUED | OUTPATIENT
Start: 2023-11-11 | End: 2023-11-11

## 2023-11-10 RX ORDER — METOPROLOL SUCCINATE 25 MG/1
25 TABLET, EXTENDED RELEASE ORAL 2 TIMES DAILY
Status: DISCONTINUED | OUTPATIENT
Start: 2023-11-10 | End: 2023-11-16 | Stop reason: HOSPADM

## 2023-11-10 RX ORDER — DULOXETIN HYDROCHLORIDE 60 MG/1
60 CAPSULE, DELAYED RELEASE ORAL DAILY
Status: DISCONTINUED | OUTPATIENT
Start: 2023-11-11 | End: 2023-11-16 | Stop reason: HOSPADM

## 2023-11-10 RX ORDER — SODIUM CHLORIDE 0.9 % (FLUSH) 0.9 %
5-40 SYRINGE (ML) INJECTION EVERY 12 HOURS SCHEDULED
Status: DISCONTINUED | OUTPATIENT
Start: 2023-11-10 | End: 2023-11-16 | Stop reason: HOSPADM

## 2023-11-10 RX ORDER — ACETAMINOPHEN 325 MG/1
650 TABLET ORAL EVERY 6 HOURS PRN
Status: DISCONTINUED | OUTPATIENT
Start: 2023-11-10 | End: 2023-11-16 | Stop reason: HOSPADM

## 2023-11-10 RX ORDER — ARFORMOTEROL TARTRATE 15 UG/2ML
15 SOLUTION RESPIRATORY (INHALATION)
Status: DISCONTINUED | OUTPATIENT
Start: 2023-11-10 | End: 2023-11-16 | Stop reason: HOSPADM

## 2023-11-10 RX ORDER — DESMOPRESSIN ACETATE 0.1 MG/1
200 TABLET ORAL 2 TIMES DAILY
Status: DISCONTINUED | OUTPATIENT
Start: 2023-11-10 | End: 2023-11-16 | Stop reason: HOSPADM

## 2023-11-10 RX ORDER — POTASSIUM CHLORIDE 20 MEQ/1
40 TABLET, EXTENDED RELEASE ORAL PRN
Status: DISCONTINUED | OUTPATIENT
Start: 2023-11-10 | End: 2023-11-16 | Stop reason: HOSPADM

## 2023-11-10 RX ORDER — IPRATROPIUM BROMIDE AND ALBUTEROL SULFATE 2.5; .5 MG/3ML; MG/3ML
1 SOLUTION RESPIRATORY (INHALATION)
Status: DISCONTINUED | OUTPATIENT
Start: 2023-11-11 | End: 2023-11-16 | Stop reason: HOSPADM

## 2023-11-10 RX ORDER — MAGNESIUM SULFATE IN WATER 40 MG/ML
2000 INJECTION, SOLUTION INTRAVENOUS PRN
Status: DISCONTINUED | OUTPATIENT
Start: 2023-11-10 | End: 2023-11-16 | Stop reason: HOSPADM

## 2023-11-10 RX ORDER — ONDANSETRON 2 MG/ML
4 INJECTION INTRAMUSCULAR; INTRAVENOUS EVERY 6 HOURS PRN
Status: DISCONTINUED | OUTPATIENT
Start: 2023-11-10 | End: 2023-11-16 | Stop reason: HOSPADM

## 2023-11-10 RX ORDER — LEVOTHYROXINE SODIUM 0.07 MG/1
75 TABLET ORAL DAILY
Status: DISCONTINUED | OUTPATIENT
Start: 2023-11-11 | End: 2023-11-12

## 2023-11-10 RX ORDER — ONDANSETRON 4 MG/1
4 TABLET, ORALLY DISINTEGRATING ORAL EVERY 8 HOURS PRN
Status: DISCONTINUED | OUTPATIENT
Start: 2023-11-10 | End: 2023-11-16 | Stop reason: HOSPADM

## 2023-11-10 RX ORDER — POTASSIUM CHLORIDE 7.45 MG/ML
10 INJECTION INTRAVENOUS PRN
Status: DISCONTINUED | OUTPATIENT
Start: 2023-11-10 | End: 2023-11-16 | Stop reason: HOSPADM

## 2023-11-10 RX ADMIN — APIXABAN 5 MG: 5 TABLET, FILM COATED ORAL at 23:05

## 2023-11-10 RX ADMIN — ACETAMINOPHEN 650 MG: 325 TABLET ORAL at 22:50

## 2023-11-10 RX ADMIN — IOPAMIDOL 75 ML: 755 INJECTION, SOLUTION INTRAVENOUS at 18:44

## 2023-11-10 RX ADMIN — METOPROLOL SUCCINATE 25 MG: 25 TABLET, EXTENDED RELEASE ORAL at 23:05

## 2023-11-10 RX ADMIN — LEVETIRACETAM 500 MG: 500 TABLET, FILM COATED ORAL at 23:05

## 2023-11-10 RX ADMIN — WATER 1000 MG: 1 INJECTION INTRAMUSCULAR; INTRAVENOUS; SUBCUTANEOUS at 16:00

## 2023-11-10 ASSESSMENT — PAIN DESCRIPTION - ONSET: ONSET: ON-GOING

## 2023-11-10 ASSESSMENT — PATIENT HEALTH QUESTIONNAIRE - PHQ9
SUM OF ALL RESPONSES TO PHQ QUESTIONS 1-9: 0
1. LITTLE INTEREST OR PLEASURE IN DOING THINGS: 0
SUM OF ALL RESPONSES TO PHQ QUESTIONS 1-9: 0
SUM OF ALL RESPONSES TO PHQ QUESTIONS 1-9: 0
SUM OF ALL RESPONSES TO PHQ9 QUESTIONS 1 & 2: 0
SUM OF ALL RESPONSES TO PHQ QUESTIONS 1-9: 0
2. FEELING DOWN, DEPRESSED OR HOPELESS: 0

## 2023-11-10 ASSESSMENT — PAIN DESCRIPTION - FREQUENCY: FREQUENCY: INTERMITTENT

## 2023-11-10 ASSESSMENT — PAIN DESCRIPTION - ORIENTATION: ORIENTATION: MID

## 2023-11-10 ASSESSMENT — LIFESTYLE VARIABLES: HOW OFTEN DO YOU HAVE A DRINK CONTAINING ALCOHOL: NEVER

## 2023-11-10 ASSESSMENT — PAIN DESCRIPTION - DESCRIPTORS
DESCRIPTORS: ACHING
DESCRIPTORS: ACHING
DESCRIPTORS: ACHING;DISCOMFORT;GNAWING

## 2023-11-10 ASSESSMENT — PAIN DESCRIPTION - LOCATION
LOCATION: CHEST
LOCATION: OTHER (COMMENT)
LOCATION: SACRUM

## 2023-11-10 ASSESSMENT — PAIN SCALES - GENERAL
PAINLEVEL_OUTOF10: 5
PAINLEVEL_OUTOF10: 8
PAINLEVEL_OUTOF10: 8

## 2023-11-10 ASSESSMENT — PAIN DESCRIPTION - PAIN TYPE
TYPE: CHRONIC PAIN
TYPE: ACUTE PAIN

## 2023-11-10 ASSESSMENT — PAIN - FUNCTIONAL ASSESSMENT: PAIN_FUNCTIONAL_ASSESSMENT: 0-10

## 2023-11-10 NOTE — ED PROVIDER NOTES
Phil        Pt Name: Charisse Falcon  MRN: [id number]  [address] 1955  Date of evaluation: 11/10/2023  Provider: Neil Redmond MD  PCP: Darby Greene MD  Note Started: 1:52 PM EST 11/10/23    CHIEF COMPLAINT       Chief Complaint   Patient presents with    Fatigue     Per family, weakness x 5 days and progressively getting worse       HISTORY OF PRESENT ILLNESS: 1 or more Elements   History From: PATIENT    Limitations to history : None    Charisse Falcon is a 78 y.o. female who presents to the ED with complaints of generalized fatigue and weakness. Patient notes that for the past 5 days she has been experiencing this. At first, the patient tried to pass it off and rest at home. She continue taking her prescribed medications and using her oxygen; however, symptoms gradually worsened. Patient became alarmed when she began to experience other symptoms including abdominal pain, diarrhea, and increased shortness of breath. The abdominal pain is located in the right upper and lower quadrant. It is described as being a sharp aching sensation that is constant. Bowel movements are primarily diarrhea with little blood. It is not enough blood to fill the bowl. The shortness of breath is constant and described as being \"I just cannot catch her breath\". Patient denies any long distance travel, sick contacts, changes in medications, recent illness, or outdoor activities. At this time, the patient denies headache, dizziness, vision changes, tinnitus, heart palpitations, chest pain, tearing back pain, numbness of extremities, rashes, nausea, and vomiting. Nursing Notes were all reviewed and agreed with or any disagreements were addressed in the HPI. REVIEW OF SYSTEMS :      Positives and Pertinent negatives as per HPI.      SURGICAL HISTORY     Past Surgical History:   Procedure Laterality Date

## 2023-11-11 LAB
ALBUMIN SERPL-MCNC: 3.8 G/DL (ref 3.5–5.2)
ALP SERPL-CCNC: 88 U/L (ref 35–104)
ALT SERPL-CCNC: 10 U/L (ref 0–32)
ANION GAP SERPL CALCULATED.3IONS-SCNC: 17 MMOL/L (ref 7–16)
AST SERPL-CCNC: 23 U/L (ref 0–31)
B.E.: 7.4 MMOL/L (ref -3–3)
BASOPHILS # BLD: 0.04 K/UL (ref 0–0.2)
BASOPHILS NFR BLD: 1 % (ref 0–2)
BILIRUB SERPL-MCNC: 0.3 MG/DL (ref 0–1.2)
BUN SERPL-MCNC: 16 MG/DL (ref 6–23)
CALCIUM SERPL-MCNC: 9.7 MG/DL (ref 8.6–10.2)
CHLORIDE SERPL-SCNC: 95 MMOL/L (ref 98–107)
CO2 SERPL-SCNC: 29 MMOL/L (ref 22–29)
COHB: 0.6 % (ref 0–1.5)
CORTIS SERPL-MCNC: 8.4 UG/DL (ref 2.7–18.4)
CORTISOL COLLECTION INFO: NORMAL
CREAT SERPL-MCNC: 1.4 MG/DL (ref 0.5–1)
CREAT UR-MCNC: 28.2 MG/DL (ref 29–226)
CRITICAL: ABNORMAL
DATE ANALYZED: ABNORMAL
DATE OF COLLECTION: ABNORMAL
EOSINOPHIL # BLD: 0.3 K/UL (ref 0.05–0.5)
EOSINOPHILS RELATIVE PERCENT: 7 % (ref 0–6)
ERYTHROCYTE [DISTWIDTH] IN BLOOD BY AUTOMATED COUNT: 14.7 % (ref 11.5–15)
GFR SERPL CREATININE-BSD FRML MDRD: 40 ML/MIN/1.73M2
GLUCOSE SERPL-MCNC: 109 MG/DL (ref 74–99)
HCO3: 33 MMOL/L (ref 22–26)
HCT VFR BLD AUTO: 42.2 % (ref 34–48)
HGB BLD-MCNC: 12.6 G/DL (ref 11.5–15.5)
HHB: 3 % (ref 0–5)
IMM GRANULOCYTES # BLD AUTO: <0.03 K/UL (ref 0–0.58)
IMM GRANULOCYTES NFR BLD: 0 % (ref 0–5)
LAB: ABNORMAL
LYMPHOCYTES NFR BLD: 1.03 K/UL (ref 1.5–4)
LYMPHOCYTES RELATIVE PERCENT: 22 % (ref 20–42)
Lab: 1347
MAGNESIUM SERPL-MCNC: 1.9 MG/DL (ref 1.6–2.6)
MCH RBC QN AUTO: 25.4 PG (ref 26–35)
MCHC RBC AUTO-ENTMCNC: 29.9 G/DL (ref 32–34.5)
MCV RBC AUTO: 85.1 FL (ref 80–99.9)
METHB: 0.2 % (ref 0–1.5)
MODE: ABNORMAL
MONOCYTES NFR BLD: 0.54 K/UL (ref 0.1–0.95)
MONOCYTES NFR BLD: 12 % (ref 2–12)
NEUTROPHILS NFR BLD: 58 % (ref 43–80)
NEUTS SEG NFR BLD: 2.67 K/UL (ref 1.8–7.3)
O2 CONTENT: 17.9 ML/DL
O2 SATURATION: 97 % (ref 92–98.5)
O2HB: 96.2 % (ref 94–97)
OPERATOR ID: 1222
PATIENT TEMP: 37 C
PCO2: 50.1 MMHG (ref 35–45)
PH BLOOD GAS: 7.44 (ref 7.35–7.45)
PLATELET, FLUORESCENCE: 163 K/UL (ref 130–450)
PMV BLD AUTO: ABNORMAL FL (ref 7–12)
PO2: 94.7 MMHG (ref 75–100)
POTASSIUM SERPL-SCNC: 3.4 MMOL/L (ref 3.5–5)
PROT SERPL-MCNC: 6.8 G/DL (ref 6.4–8.3)
RBC # BLD AUTO: 4.96 M/UL (ref 3.5–5.5)
SODIUM SERPL-SCNC: 141 MMOL/L (ref 132–146)
SOURCE, BLOOD GAS: ABNORMAL
T4 FREE SERPL-MCNC: 2 NG/DL (ref 0.9–1.7)
THB: 13.2 G/DL (ref 11.5–16.5)
TIME ANALYZED: 1355
TSH SERPL DL<=0.05 MIU/L-ACNC: 0.18 UIU/ML (ref 0.27–4.2)
UUN UR-MCNC: 131 MG/DL (ref 800–1666)
WBC OTHER # BLD: 4.6 K/UL (ref 4.5–11.5)

## 2023-11-11 PROCEDURE — 83735 ASSAY OF MAGNESIUM: CPT

## 2023-11-11 PROCEDURE — 99222 1ST HOSP IP/OBS MODERATE 55: CPT | Performed by: FAMILY MEDICINE

## 2023-11-11 PROCEDURE — 2700000000 HC OXYGEN THERAPY PER DAY

## 2023-11-11 PROCEDURE — 1200000000 HC SEMI PRIVATE

## 2023-11-11 PROCEDURE — 85025 COMPLETE CBC W/AUTO DIFF WBC: CPT

## 2023-11-11 PROCEDURE — 94660 CPAP INITIATION&MGMT: CPT

## 2023-11-11 PROCEDURE — 6370000000 HC RX 637 (ALT 250 FOR IP)

## 2023-11-11 PROCEDURE — 2580000003 HC RX 258

## 2023-11-11 PROCEDURE — 6360000002 HC RX W HCPCS

## 2023-11-11 PROCEDURE — 94640 AIRWAY INHALATION TREATMENT: CPT

## 2023-11-11 PROCEDURE — 5A09457 ASSISTANCE WITH RESPIRATORY VENTILATION, 24-96 CONSECUTIVE HOURS, CONTINUOUS POSITIVE AIRWAY PRESSURE: ICD-10-PCS

## 2023-11-11 PROCEDURE — 84439 ASSAY OF FREE THYROXINE: CPT

## 2023-11-11 PROCEDURE — 36415 COLL VENOUS BLD VENIPUNCTURE: CPT

## 2023-11-11 PROCEDURE — 80053 COMPREHEN METABOLIC PANEL: CPT

## 2023-11-11 PROCEDURE — 82805 BLOOD GASES W/O2 SATURATION: CPT

## 2023-11-11 RX ORDER — MAGNESIUM SULFATE 1 G/100ML
1000 INJECTION INTRAVENOUS ONCE
Status: COMPLETED | OUTPATIENT
Start: 2023-11-11 | End: 2023-11-11

## 2023-11-11 RX ORDER — LORAZEPAM 2 MG/ML
0.5 INJECTION INTRAMUSCULAR ONCE
Status: COMPLETED | OUTPATIENT
Start: 2023-11-11 | End: 2023-11-11

## 2023-11-11 RX ORDER — PREDNISONE 5 MG/1
5 TABLET ORAL DAILY
Status: DISCONTINUED | OUTPATIENT
Start: 2023-11-11 | End: 2023-11-11

## 2023-11-11 RX ORDER — POTASSIUM CHLORIDE 20 MEQ/1
20 TABLET, EXTENDED RELEASE ORAL ONCE
Status: COMPLETED | OUTPATIENT
Start: 2023-11-11 | End: 2023-11-11

## 2023-11-11 RX ORDER — LORAZEPAM 0.5 MG/1
0.5 TABLET ORAL ONCE
Status: DISCONTINUED | OUTPATIENT
Start: 2023-11-11 | End: 2023-11-11

## 2023-11-11 RX ORDER — FUROSEMIDE 20 MG/1
20 TABLET ORAL DAILY
Status: DISCONTINUED | OUTPATIENT
Start: 2023-11-12 | End: 2023-11-16 | Stop reason: HOSPADM

## 2023-11-11 RX ADMIN — WATER 1000 MG: 1 INJECTION INTRAMUSCULAR; INTRAVENOUS; SUBCUTANEOUS at 17:54

## 2023-11-11 RX ADMIN — ACETAMINOPHEN 650 MG: 325 TABLET ORAL at 23:51

## 2023-11-11 RX ADMIN — LEVOTHYROXINE SODIUM 75 MCG: 0.07 TABLET ORAL at 08:34

## 2023-11-11 RX ADMIN — SODIUM CHLORIDE, PRESERVATIVE FREE 10 ML: 5 INJECTION INTRAVENOUS at 20:19

## 2023-11-11 RX ADMIN — ARFORMOTEROL TARTRATE 15 MCG: 15 SOLUTION RESPIRATORY (INHALATION) at 11:44

## 2023-11-11 RX ADMIN — SILDENAFIL 10 MG: 20 TABLET ORAL at 08:34

## 2023-11-11 RX ADMIN — LEVETIRACETAM 500 MG: 500 TABLET, FILM COATED ORAL at 08:34

## 2023-11-11 RX ADMIN — BUDESONIDE 500 MCG: 0.5 SUSPENSION RESPIRATORY (INHALATION) at 11:45

## 2023-11-11 RX ADMIN — DESMOPRESSIN ACETATE 200 MCG: 0.1 TABLET ORAL at 20:18

## 2023-11-11 RX ADMIN — LORAZEPAM 0.5 MG: 2 INJECTION INTRAMUSCULAR; INTRAVENOUS at 03:20

## 2023-11-11 RX ADMIN — LEVETIRACETAM 500 MG: 500 TABLET, FILM COATED ORAL at 20:18

## 2023-11-11 RX ADMIN — SODIUM CHLORIDE, PRESERVATIVE FREE 10 ML: 5 INJECTION INTRAVENOUS at 08:36

## 2023-11-11 RX ADMIN — SILDENAFIL 10 MG: 20 TABLET ORAL at 20:19

## 2023-11-11 RX ADMIN — FUROSEMIDE 40 MG: 40 TABLET ORAL at 08:35

## 2023-11-11 RX ADMIN — POTASSIUM CHLORIDE 20 MEQ: 1500 TABLET, EXTENDED RELEASE ORAL at 08:34

## 2023-11-11 RX ADMIN — DULOXETINE HYDROCHLORIDE 60 MG: 60 CAPSULE, DELAYED RELEASE ORAL at 08:35

## 2023-11-11 RX ADMIN — MAGNESIUM SULFATE HEPTAHYDRATE 1000 MG: 1 INJECTION, SOLUTION INTRAVENOUS at 16:46

## 2023-11-11 RX ADMIN — AMLODIPINE BESYLATE 5 MG: 5 TABLET ORAL at 08:34

## 2023-11-11 RX ADMIN — IPRATROPIUM BROMIDE AND ALBUTEROL SULFATE 1 DOSE: 2.5; .5 SOLUTION RESPIRATORY (INHALATION) at 11:43

## 2023-11-11 RX ADMIN — ACETAMINOPHEN 650 MG: 325 TABLET ORAL at 17:57

## 2023-11-11 RX ADMIN — METOPROLOL SUCCINATE 25 MG: 25 TABLET, EXTENDED RELEASE ORAL at 20:18

## 2023-11-11 RX ADMIN — DESMOPRESSIN ACETATE 200 MCG: 0.1 TABLET ORAL at 08:35

## 2023-11-11 RX ADMIN — METOPROLOL SUCCINATE 25 MG: 25 TABLET, EXTENDED RELEASE ORAL at 08:34

## 2023-11-11 RX ADMIN — APIXABAN 5 MG: 5 TABLET, FILM COATED ORAL at 08:34

## 2023-11-11 RX ADMIN — ESCITALOPRAM OXALATE 10 MG: 10 TABLET ORAL at 08:35

## 2023-11-11 RX ADMIN — APIXABAN 5 MG: 5 TABLET, FILM COATED ORAL at 20:18

## 2023-11-11 ASSESSMENT — PAIN SCALES - GENERAL
PAINLEVEL_OUTOF10: 0
PAINLEVEL_OUTOF10: 3
PAINLEVEL_OUTOF10: 6

## 2023-11-11 NOTE — PLAN OF CARE
Problem: Pain  Goal: Verbalizes/displays adequate comfort level or baseline comfort level  11/11/2023 1110 by Jasmina Gonzales RN  Outcome: Progressing     Problem: Skin/Tissue Integrity  Goal: Absence of new skin breakdown  Description: 1. Monitor for areas of redness and/or skin breakdown  2. Assess vascular access sites hourly  3. Every 4-6 hours minimum:  Change oxygen saturation probe site  4. Every 4-6 hours:  If on nasal continuous positive airway pressure, respiratory therapy assess nares and determine need for appliance change or resting period.   Outcome: Progressing     Problem: Safety - Adult  Goal: Free from fall injury  Outcome: Progressing     Problem: ABCDS Injury Assessment  Goal: Absence of physical injury  Outcome: Progressing

## 2023-11-11 NOTE — ED NOTES
Pt incontinent of stool.  Pt cleaned and changed, new purwick placed     Awa Jackson RN  11/10/23 [address], [address]  11/10/23 0420

## 2023-11-11 NOTE — H&P
[address] [place] [address]  Family Medicine Residency Program  History and Physical    Patient:  Clay Costa 78 y.o. female MRN: [id number]     Date of Service: 11/10/2023    Hospital Day: 1      Chief complaint: had concerns including Fatigue (Per family, weakness x 5 days and progressively getting worse). History of Present Illness     Clay Costa is a 78 y.o. female with a past medical history of sarcoidosis and congestive heart failure who presents to the ED with complaints of generalized fatigue and weakness. Patient notes that for the past 5 days she has been experiencing this. At first, the patient tried to pass it off and rest at home. She continue taking her prescribed medications and using her oxygen; however, symptoms gradually worsened. Patient became alarmed when she began to experience other symptoms including abdominal pain, diarrhea, and increased shortness of breath. The abdominal pain is located in the right upper and lower quadrant. It is described as being a sharp aching sensation that is constant. Bowel movements are primarily diarrhea with little blood. It is not enough blood to fill the bowl. The shortness of breath is constant and described as being \"I just cannot catch her breath\". Patient denies any long distance travel, sick contacts, changes in medications, recent illness, or outdoor activities. At this time, the patient denies headache, dizziness, vision changes, tinnitus, heart palpitations, chest pain, tearing back pain, numbness of extremities, rashes, nausea, and vomiting. ED Course  In the ED, the patient was hemodynamically stable. EKG with normal sinus rhythm with right axis deviation and appears to be unchanged from prior EKG. No sign of STEMI or NSTEMI. Labs were pertinent for a urinalysis with leukocyte esterase, nitrates, white blood cells, and bacteria.    Imaging pertinent for cardiomegaly, bladder diverticulum, large ventral hernia,

## 2023-11-12 ENCOUNTER — APPOINTMENT (OUTPATIENT)
Dept: GENERAL RADIOLOGY | Age: 68
DRG: 683 | End: 2023-11-12
Payer: MEDICARE

## 2023-11-12 LAB
ALBUMIN SERPL-MCNC: 3.3 G/DL (ref 3.5–5.2)
ALP SERPL-CCNC: 82 U/L (ref 35–104)
ALT SERPL-CCNC: 9 U/L (ref 0–32)
ANION GAP SERPL CALCULATED.3IONS-SCNC: 16 MMOL/L (ref 7–16)
AST SERPL-CCNC: 20 U/L (ref 0–31)
BASOPHILS # BLD: 0.04 K/UL (ref 0–0.2)
BASOPHILS NFR BLD: 1 % (ref 0–2)
BILIRUB SERPL-MCNC: 0.3 MG/DL (ref 0–1.2)
BUN SERPL-MCNC: 22 MG/DL (ref 6–23)
CALCIUM SERPL-MCNC: 9.6 MG/DL (ref 8.6–10.2)
CHLORIDE SERPL-SCNC: 98 MMOL/L (ref 98–107)
CO2 SERPL-SCNC: 30 MMOL/L (ref 22–29)
CORTIS SERPL-MCNC: 5.3 UG/DL (ref 2.7–18.4)
CORTISOL COLLECTION INFO: NORMAL
CREAT SERPL-MCNC: 1.5 MG/DL (ref 0.5–1)
D DIMER: <200 NG/ML DDU (ref 0–232)
EOSINOPHIL # BLD: 0.41 K/UL (ref 0.05–0.5)
EOSINOPHILS RELATIVE PERCENT: 10 % (ref 0–6)
ERYTHROCYTE [DISTWIDTH] IN BLOOD BY AUTOMATED COUNT: 14.9 % (ref 11.5–15)
GFR SERPL CREATININE-BSD FRML MDRD: 39 ML/MIN/1.73M2
GLUCOSE SERPL-MCNC: 79 MG/DL (ref 74–99)
HCT VFR BLD AUTO: 38.7 % (ref 34–48)
HGB BLD-MCNC: 11.7 G/DL (ref 11.5–15.5)
IMM GRANULOCYTES # BLD AUTO: <0.03 K/UL (ref 0–0.58)
IMM GRANULOCYTES NFR BLD: 0 % (ref 0–5)
LYMPHOCYTES NFR BLD: 0.92 K/UL (ref 1.5–4)
LYMPHOCYTES RELATIVE PERCENT: 23 % (ref 20–42)
MAGNESIUM SERPL-MCNC: 2.2 MG/DL (ref 1.6–2.6)
MCH RBC QN AUTO: 25.7 PG (ref 26–35)
MCHC RBC AUTO-ENTMCNC: 30.2 G/DL (ref 32–34.5)
MCV RBC AUTO: 85.1 FL (ref 80–99.9)
MONOCYTES NFR BLD: 0.6 K/UL (ref 0.1–0.95)
MONOCYTES NFR BLD: 15 % (ref 2–12)
NEUTROPHILS NFR BLD: 51 % (ref 43–80)
NEUTS SEG NFR BLD: 2.05 K/UL (ref 1.8–7.3)
PLATELET, FLUORESCENCE: 130 K/UL (ref 130–450)
PMV BLD AUTO: ABNORMAL FL (ref 7–12)
POTASSIUM SERPL-SCNC: 3.7 MMOL/L (ref 3.5–5)
PROT SERPL-MCNC: 6.6 G/DL (ref 6.4–8.3)
RBC # BLD AUTO: 4.55 M/UL (ref 3.5–5.5)
SODIUM SERPL-SCNC: 144 MMOL/L (ref 132–146)
TROPONIN I SERPL HS-MCNC: 26 NG/L (ref 0–9)
TROPONIN I SERPL HS-MCNC: 29 NG/L (ref 0–9)
WBC OTHER # BLD: 4 K/UL (ref 4.5–11.5)

## 2023-11-12 PROCEDURE — 6360000002 HC RX W HCPCS

## 2023-11-12 PROCEDURE — 36415 COLL VENOUS BLD VENIPUNCTURE: CPT

## 2023-11-12 PROCEDURE — 2700000000 HC OXYGEN THERAPY PER DAY

## 2023-11-12 PROCEDURE — 94660 CPAP INITIATION&MGMT: CPT

## 2023-11-12 PROCEDURE — 1200000000 HC SEMI PRIVATE

## 2023-11-12 PROCEDURE — 71046 X-RAY EXAM CHEST 2 VIEWS: CPT

## 2023-11-12 PROCEDURE — 83735 ASSAY OF MAGNESIUM: CPT

## 2023-11-12 PROCEDURE — 99232 SBSQ HOSP IP/OBS MODERATE 35: CPT | Performed by: FAMILY MEDICINE

## 2023-11-12 PROCEDURE — 82533 TOTAL CORTISOL: CPT

## 2023-11-12 PROCEDURE — 2580000003 HC RX 258

## 2023-11-12 PROCEDURE — 80053 COMPREHEN METABOLIC PANEL: CPT

## 2023-11-12 PROCEDURE — 94640 AIRWAY INHALATION TREATMENT: CPT

## 2023-11-12 PROCEDURE — 84484 ASSAY OF TROPONIN QUANT: CPT

## 2023-11-12 PROCEDURE — 85025 COMPLETE CBC W/AUTO DIFF WBC: CPT

## 2023-11-12 PROCEDURE — 93005 ELECTROCARDIOGRAM TRACING: CPT

## 2023-11-12 PROCEDURE — 6370000000 HC RX 637 (ALT 250 FOR IP)

## 2023-11-12 PROCEDURE — 85379 FIBRIN DEGRADATION QUANT: CPT

## 2023-11-12 RX ORDER — POTASSIUM CHLORIDE 20 MEQ/1
20 TABLET, EXTENDED RELEASE ORAL ONCE
Status: COMPLETED | OUTPATIENT
Start: 2023-11-12 | End: 2023-11-12

## 2023-11-12 RX ORDER — LEVOTHYROXINE SODIUM 0.05 MG/1
50 TABLET ORAL DAILY
Status: DISCONTINUED | OUTPATIENT
Start: 2023-11-13 | End: 2023-11-16 | Stop reason: HOSPADM

## 2023-11-12 RX ORDER — COSYNTROPIN 0.25 MG/ML
250 INJECTION, POWDER, FOR SOLUTION INTRAMUSCULAR; INTRAVENOUS ONCE
Status: COMPLETED | OUTPATIENT
Start: 2023-11-13 | End: 2023-11-13

## 2023-11-12 RX ADMIN — WATER 1000 MG: 1 INJECTION INTRAMUSCULAR; INTRAVENOUS; SUBCUTANEOUS at 17:44

## 2023-11-12 RX ADMIN — LEVETIRACETAM 500 MG: 500 TABLET, FILM COATED ORAL at 08:32

## 2023-11-12 RX ADMIN — AMLODIPINE BESYLATE 5 MG: 5 TABLET ORAL at 09:11

## 2023-11-12 RX ADMIN — METOPROLOL SUCCINATE 25 MG: 25 TABLET, EXTENDED RELEASE ORAL at 09:11

## 2023-11-12 RX ADMIN — PANTOPRAZOLE SODIUM 40 MG: 40 TABLET, DELAYED RELEASE ORAL at 05:55

## 2023-11-12 RX ADMIN — BUDESONIDE 500 MCG: 0.5 SUSPENSION RESPIRATORY (INHALATION) at 13:09

## 2023-11-12 RX ADMIN — SILDENAFIL 10 MG: 20 TABLET ORAL at 08:32

## 2023-11-12 RX ADMIN — DULOXETINE HYDROCHLORIDE 60 MG: 60 CAPSULE, DELAYED RELEASE ORAL at 08:32

## 2023-11-12 RX ADMIN — LEVETIRACETAM 500 MG: 500 TABLET, FILM COATED ORAL at 20:47

## 2023-11-12 RX ADMIN — ACETAMINOPHEN 650 MG: 325 TABLET ORAL at 05:56

## 2023-11-12 RX ADMIN — ACETAMINOPHEN 650 MG: 325 TABLET ORAL at 23:47

## 2023-11-12 RX ADMIN — ESCITALOPRAM OXALATE 10 MG: 10 TABLET ORAL at 08:33

## 2023-11-12 RX ADMIN — DESMOPRESSIN ACETATE 200 MCG: 0.1 TABLET ORAL at 08:32

## 2023-11-12 RX ADMIN — APIXABAN 5 MG: 5 TABLET, FILM COATED ORAL at 20:47

## 2023-11-12 RX ADMIN — LEVOTHYROXINE SODIUM 75 MCG: 0.07 TABLET ORAL at 08:33

## 2023-11-12 RX ADMIN — POTASSIUM CHLORIDE 20 MEQ: 1500 TABLET, EXTENDED RELEASE ORAL at 09:11

## 2023-11-12 RX ADMIN — ARFORMOTEROL TARTRATE 15 MCG: 15 SOLUTION RESPIRATORY (INHALATION) at 13:08

## 2023-11-12 RX ADMIN — SODIUM CHLORIDE, PRESERVATIVE FREE 10 ML: 5 INJECTION INTRAVENOUS at 08:31

## 2023-11-12 RX ADMIN — IPRATROPIUM BROMIDE AND ALBUTEROL SULFATE 1 DOSE: 2.5; .5 SOLUTION RESPIRATORY (INHALATION) at 13:07

## 2023-11-12 RX ADMIN — APIXABAN 5 MG: 5 TABLET, FILM COATED ORAL at 08:32

## 2023-11-12 RX ADMIN — SILDENAFIL 10 MG: 20 TABLET ORAL at 14:52

## 2023-11-12 RX ADMIN — DESMOPRESSIN ACETATE 200 MCG: 0.1 TABLET ORAL at 20:47

## 2023-11-12 RX ADMIN — SODIUM CHLORIDE, PRESERVATIVE FREE 10 ML: 5 INJECTION INTRAVENOUS at 20:47

## 2023-11-12 ASSESSMENT — PAIN SCALES - GENERAL
PAINLEVEL_OUTOF10: 4
PAINLEVEL_OUTOF10: 6
PAINLEVEL_OUTOF10: 0
PAINLEVEL_OUTOF10: 0
PAINLEVEL_OUTOF10: 8

## 2023-11-12 NOTE — PLAN OF CARE
Problem: Pain  Goal: Verbalizes/displays adequate comfort level or baseline comfort level  11/11/2023 2123 by Lauren Saenz RN  Outcome: Progressing  11/11/2023 1110 by Cary Tarango RN  Outcome: Progressing     Problem: Skin/Tissue Integrity  Goal: Absence of new skin breakdown  Description: 1. Monitor for areas of redness and/or skin breakdown  2. Assess vascular access sites hourly  3. Every 4-6 hours minimum:  Change oxygen saturation probe site  4. Every 4-6 hours:  If on nasal continuous positive airway pressure, respiratory therapy assess nares and determine need for appliance change or resting period.   11/11/2023 2123 by Lauren Saenz RN  Outcome: Progressing  11/11/2023 1110 by Cary Tarango RN  Outcome: Progressing     Problem: Safety - Adult  Goal: Free from fall injury  11/11/2023 2123 by Lauren Saenz RN  Outcome: Progressing  11/11/2023 1110 by Cary Tarango RN  Outcome: Progressing     Problem: ABCDS Injury Assessment  Goal: Absence of physical injury  11/11/2023 2123 by Lauren Saenz RN  Outcome: Progressing  11/11/2023 1110 by Cary Tarango RN  Outcome: Progressing

## 2023-11-12 NOTE — PLAN OF CARE
Problem: Pain  Goal: Verbalizes/displays adequate comfort level or baseline comfort level  11/12/2023 0940 by Antoni Mirza RN  Outcome: Progressing     Problem: Skin/Tissue Integrity  Goal: Absence of new skin breakdown  Description: 1. Monitor for areas of redness and/or skin breakdown  2. Assess vascular access sites hourly  3. Every 4-6 hours minimum:  Change oxygen saturation probe site  4. Every 4-6 hours:  If on nasal continuous positive airway pressure, respiratory therapy assess nares and determine need for appliance change or resting period.   11/12/2023 0940 by Antoni Mirza RN  Outcome: Progressing     Problem: Safety - Adult  Goal: Free from fall injury  11/12/2023 0940 by Antoni Mirza RN  Outcome: Progressing     Problem: ABCDS Injury Assessment  Goal: Absence of physical injury  11/12/2023 0940 by Antoni Mirza RN  Outcome: Progressing

## 2023-11-13 ENCOUNTER — APPOINTMENT (OUTPATIENT)
Dept: ULTRASOUND IMAGING | Age: 68
DRG: 683 | End: 2023-11-13
Payer: MEDICARE

## 2023-11-13 PROBLEM — E23.0 HYPOPITUITARISM (HCC): Status: ACTIVE | Noted: 2023-11-13

## 2023-11-13 PROBLEM — E27.49 SECONDARY ADRENAL INSUFFICIENCY (HCC): Status: ACTIVE | Noted: 2023-11-13

## 2023-11-13 LAB
ALBUMIN SERPL-MCNC: 3.5 G/DL (ref 3.5–5.2)
ALP SERPL-CCNC: 78 U/L (ref 35–104)
ALT SERPL-CCNC: 8 U/L (ref 0–32)
ANION GAP SERPL CALCULATED.3IONS-SCNC: 11 MMOL/L (ref 7–16)
AST SERPL-CCNC: 21 U/L (ref 0–31)
BASOPHILS # BLD: 0.03 K/UL (ref 0–0.2)
BASOPHILS NFR BLD: 1 % (ref 0–2)
BILIRUB SERPL-MCNC: 0.3 MG/DL (ref 0–1.2)
BUN SERPL-MCNC: 26 MG/DL (ref 6–23)
CALCIUM SERPL-MCNC: 9.2 MG/DL (ref 8.6–10.2)
CHLORIDE SERPL-SCNC: 97 MMOL/L (ref 98–107)
CO2 SERPL-SCNC: 33 MMOL/L (ref 22–29)
CORTIS SERPL-MCNC: 14.7 UG/DL (ref 2.7–18.4)
CORTIS SERPL-MCNC: 19.2 UG/DL (ref 2.7–18.4)
CORTIS SERPL-MCNC: 3.9 UG/DL (ref 2.7–18.4)
CORTISOL COLLECTION INFO: ABNORMAL
CORTISOL COLLECTION INFO: NORMAL
CORTISOL COLLECTION INFO: NORMAL
CREAT SERPL-MCNC: 1.9 MG/DL (ref 0.5–1)
EOSINOPHIL # BLD: 0.41 K/UL (ref 0.05–0.5)
EOSINOPHILS RELATIVE PERCENT: 9 % (ref 0–6)
ERYTHROCYTE [DISTWIDTH] IN BLOOD BY AUTOMATED COUNT: 15 % (ref 11.5–15)
GFR SERPL CREATININE-BSD FRML MDRD: 30 ML/MIN/1.73M2
GLUCOSE SERPL-MCNC: 85 MG/DL (ref 74–99)
HCT VFR BLD AUTO: 36.9 % (ref 34–48)
HGB BLD-MCNC: 11.2 G/DL (ref 11.5–15.5)
IMM GRANULOCYTES # BLD AUTO: <0.03 K/UL (ref 0–0.58)
IMM GRANULOCYTES NFR BLD: 0 % (ref 0–5)
LYMPHOCYTES NFR BLD: 1.17 K/UL (ref 1.5–4)
LYMPHOCYTES RELATIVE PERCENT: 26 % (ref 20–42)
MCH RBC QN AUTO: 25.8 PG (ref 26–35)
MCHC RBC AUTO-ENTMCNC: 30.4 G/DL (ref 32–34.5)
MCV RBC AUTO: 85 FL (ref 80–99.9)
MICROORGANISM SPEC CULT: ABNORMAL
MONOCYTES NFR BLD: 0.62 K/UL (ref 0.1–0.95)
MONOCYTES NFR BLD: 14 % (ref 2–12)
NEUTROPHILS NFR BLD: 50 % (ref 43–80)
NEUTS SEG NFR BLD: 2.2 K/UL (ref 1.8–7.3)
PLATELET # BLD AUTO: 147 K/UL (ref 130–450)
PMV BLD AUTO: 11.5 FL (ref 7–12)
POTASSIUM SERPL-SCNC: 3.9 MMOL/L (ref 3.5–5)
PROT SERPL-MCNC: 6.2 G/DL (ref 6.4–8.3)
RBC # BLD AUTO: 4.34 M/UL (ref 3.5–5.5)
SODIUM SERPL-SCNC: 141 MMOL/L (ref 132–146)
SPECIMEN DESCRIPTION: ABNORMAL
WBC OTHER # BLD: 4.4 K/UL (ref 4.5–11.5)

## 2023-11-13 PROCEDURE — 2580000003 HC RX 258

## 2023-11-13 PROCEDURE — 94660 CPAP INITIATION&MGMT: CPT

## 2023-11-13 PROCEDURE — 6370000000 HC RX 637 (ALT 250 FOR IP): Performed by: INTERNAL MEDICINE

## 2023-11-13 PROCEDURE — 6360000002 HC RX W HCPCS

## 2023-11-13 PROCEDURE — 97530 THERAPEUTIC ACTIVITIES: CPT

## 2023-11-13 PROCEDURE — 80053 COMPREHEN METABOLIC PANEL: CPT

## 2023-11-13 PROCEDURE — 97535 SELF CARE MNGMENT TRAINING: CPT

## 2023-11-13 PROCEDURE — 6370000000 HC RX 637 (ALT 250 FOR IP)

## 2023-11-13 PROCEDURE — 36415 COLL VENOUS BLD VENIPUNCTURE: CPT

## 2023-11-13 PROCEDURE — 99222 1ST HOSP IP/OBS MODERATE 55: CPT | Performed by: INTERNAL MEDICINE

## 2023-11-13 PROCEDURE — 85025 COMPLETE CBC W/AUTO DIFF WBC: CPT

## 2023-11-13 PROCEDURE — 99232 SBSQ HOSP IP/OBS MODERATE 35: CPT | Performed by: FAMILY MEDICINE

## 2023-11-13 PROCEDURE — 94640 AIRWAY INHALATION TREATMENT: CPT

## 2023-11-13 PROCEDURE — 76775 US EXAM ABDO BACK WALL LIM: CPT

## 2023-11-13 PROCEDURE — 1200000000 HC SEMI PRIVATE

## 2023-11-13 PROCEDURE — 2700000000 HC OXYGEN THERAPY PER DAY

## 2023-11-13 PROCEDURE — 97161 PT EVAL LOW COMPLEX 20 MIN: CPT

## 2023-11-13 PROCEDURE — 97165 OT EVAL LOW COMPLEX 30 MIN: CPT

## 2023-11-13 PROCEDURE — 76770 US EXAM ABDO BACK WALL COMP: CPT

## 2023-11-13 PROCEDURE — 82533 TOTAL CORTISOL: CPT

## 2023-11-13 RX ORDER — HYDROCORTISONE 5 MG/1
10 TABLET ORAL EVERY EVENING
Status: DISCONTINUED | OUTPATIENT
Start: 2023-11-13 | End: 2023-11-16 | Stop reason: HOSPADM

## 2023-11-13 RX ORDER — LIDOCAINE 4 G/G
2 PATCH TOPICAL DAILY
Status: DISCONTINUED | OUTPATIENT
Start: 2023-11-13 | End: 2023-11-16 | Stop reason: HOSPADM

## 2023-11-13 RX ORDER — HYDROCORTISONE 20 MG/1
20 TABLET ORAL EVERY MORNING
Status: DISCONTINUED | OUTPATIENT
Start: 2023-11-14 | End: 2023-11-16 | Stop reason: HOSPADM

## 2023-11-13 RX ADMIN — WATER 1000 MG: 1 INJECTION INTRAMUSCULAR; INTRAVENOUS; SUBCUTANEOUS at 18:24

## 2023-11-13 RX ADMIN — IPRATROPIUM BROMIDE AND ALBUTEROL SULFATE 1 DOSE: 2.5; .5 SOLUTION RESPIRATORY (INHALATION) at 08:52

## 2023-11-13 RX ADMIN — ARFORMOTEROL TARTRATE 15 MCG: 15 SOLUTION RESPIRATORY (INHALATION) at 20:09

## 2023-11-13 RX ADMIN — SILDENAFIL 10 MG: 20 TABLET ORAL at 16:19

## 2023-11-13 RX ADMIN — DULOXETINE HYDROCHLORIDE 60 MG: 60 CAPSULE, DELAYED RELEASE ORAL at 08:00

## 2023-11-13 RX ADMIN — LEVOTHYROXINE SODIUM 50 MCG: 0.05 TABLET ORAL at 08:00

## 2023-11-13 RX ADMIN — APIXABAN 5 MG: 5 TABLET, FILM COATED ORAL at 08:00

## 2023-11-13 RX ADMIN — BUDESONIDE 500 MCG: 0.5 SUSPENSION RESPIRATORY (INHALATION) at 08:52

## 2023-11-13 RX ADMIN — ARFORMOTEROL TARTRATE 15 MCG: 15 SOLUTION RESPIRATORY (INHALATION) at 08:52

## 2023-11-13 RX ADMIN — IPRATROPIUM BROMIDE AND ALBUTEROL SULFATE 1 DOSE: 2.5; .5 SOLUTION RESPIRATORY (INHALATION) at 17:04

## 2023-11-13 RX ADMIN — ESCITALOPRAM OXALATE 10 MG: 10 TABLET ORAL at 08:00

## 2023-11-13 RX ADMIN — DESMOPRESSIN ACETATE 200 MCG: 0.1 TABLET ORAL at 08:01

## 2023-11-13 RX ADMIN — ACETAMINOPHEN 650 MG: 325 TABLET ORAL at 05:47

## 2023-11-13 RX ADMIN — LEVETIRACETAM 500 MG: 500 TABLET, FILM COATED ORAL at 08:00

## 2023-11-13 RX ADMIN — COSYNTROPIN 250 MCG: 0.25 INJECTION, POWDER, LYOPHILIZED, FOR SOLUTION INTRAMUSCULAR; INTRAVENOUS at 08:03

## 2023-11-13 RX ADMIN — IPRATROPIUM BROMIDE AND ALBUTEROL SULFATE 1 DOSE: 2.5; .5 SOLUTION RESPIRATORY (INHALATION) at 20:09

## 2023-11-13 RX ADMIN — SODIUM CHLORIDE, PRESERVATIVE FREE 10 ML: 5 INJECTION INTRAVENOUS at 08:03

## 2023-11-13 RX ADMIN — LEVETIRACETAM 500 MG: 500 TABLET, FILM COATED ORAL at 21:09

## 2023-11-13 RX ADMIN — ACETAMINOPHEN 650 MG: 325 TABLET ORAL at 22:33

## 2023-11-13 RX ADMIN — DESMOPRESSIN ACETATE 200 MCG: 0.1 TABLET ORAL at 21:09

## 2023-11-13 RX ADMIN — BUDESONIDE 500 MCG: 0.5 SUSPENSION RESPIRATORY (INHALATION) at 20:09

## 2023-11-13 RX ADMIN — APIXABAN 5 MG: 5 TABLET, FILM COATED ORAL at 21:09

## 2023-11-13 RX ADMIN — PANTOPRAZOLE SODIUM 40 MG: 40 TABLET, DELAYED RELEASE ORAL at 05:47

## 2023-11-13 RX ADMIN — SODIUM CHLORIDE, PRESERVATIVE FREE 10 ML: 5 INJECTION INTRAVENOUS at 21:10

## 2023-11-13 RX ADMIN — ACETAMINOPHEN 650 MG: 325 TABLET ORAL at 16:19

## 2023-11-13 RX ADMIN — IPRATROPIUM BROMIDE AND ALBUTEROL SULFATE 1 DOSE: 2.5; .5 SOLUTION RESPIRATORY (INHALATION) at 13:14

## 2023-11-13 RX ADMIN — ONDANSETRON 4 MG: 2 INJECTION INTRAMUSCULAR; INTRAVENOUS at 09:37

## 2023-11-13 RX ADMIN — HYDROCORTISONE 10 MG: 5 TABLET ORAL at 21:13

## 2023-11-13 ASSESSMENT — PAIN SCALES - GENERAL
PAINLEVEL_OUTOF10: 5
PAINLEVEL_OUTOF10: 7
PAINLEVEL_OUTOF10: 0
PAINLEVEL_OUTOF10: 6
PAINLEVEL_OUTOF10: 7

## 2023-11-13 ASSESSMENT — PAIN DESCRIPTION - LOCATION: LOCATION: GENERALIZED

## 2023-11-13 ASSESSMENT — PAIN DESCRIPTION - DESCRIPTORS: DESCRIPTORS: ACHING;SORE

## 2023-11-13 NOTE — CARE COORDINATION
Patient with a past medical history of sarcoidosis and congestive heart failure is admitted with Generalized weakness and Acute kidney injury on CKD. Per family med note today, Disposition planning for discharge to home in 1-2 days pending urine culture results. I attempted to meet with patient in room to explain role, discuss therapy evals and transition of care but she was out of room at [facility]. Per therapy notes, Patient lives with  in 1 story home with ramp to enter. Bathroom setup: tub/shower unit with shower chair. Equipment owned: w/c, ww, BSC, S.C. Prior Level of Function: IND with ADLs. Assist with IADLs ambulated with rollator prior to admission. Driving: no. Occupation: retired. Pain Level: 10/10 abdominal pain. Cognition: A&O: 4/4 - oriented to self, year, month, and location. Follows multi step directions. Memory:  fair. PT/OT [facility] 7/24 and 14/24. Patient has a recent Hx at [facility] Will re attempt to see patient.   Arun Larsen RN   [phone]

## 2023-11-13 NOTE — ACP (ADVANCE CARE PLANNING)
Advance Care Planning   The patient has the following advanced directives on file:  Advance Directives       Power of  Living Will ACP-Advance Directive ACP-Power of     Not on File Not on File Filed Not on File            The patient has appointed the following active healthcare agents:    Primary Decision Maker: [address] [place] [address] [address] - [phone]    Secondary Decision Maker: [facility] - [phone]    The Patient has the following current code status:    Code Status: Full Code      Natalie Mayo RN  11/13/2023

## 2023-11-13 NOTE — PLAN OF CARE
Problem: Pain  Goal: Verbalizes/displays adequate comfort level or baseline comfort level  11/12/2023 2146 by Espinoza Vicente RN  Outcome: Progressing  11/12/2023 0940 by Carole Packer RN  Outcome: Progressing     Problem: Skin/Tissue Integrity  Goal: Absence of new skin breakdown  Description: 1. Monitor for areas of redness and/or skin breakdown  2. Assess vascular access sites hourly  3. Every 4-6 hours minimum:  Change oxygen saturation probe site  4. Every 4-6 hours:  If on nasal continuous positive airway pressure, respiratory therapy assess nares and determine need for appliance change or resting period.   11/12/2023 2146 by Espinoza Vicente RN  Outcome: Progressing  11/12/2023 0940 by Carole Packer RN  Outcome: Progressing     Problem: Safety - Adult  Goal: Free from fall injury  11/12/2023 2146 by Espinoza Vicente RN  Outcome: Progressing  11/12/2023 0940 by Carole Packer RN  Outcome: Progressing     Problem: ABCDS Injury Assessment  Goal: Absence of physical injury  11/12/2023 2146 by Espinoza Vicente RN  Outcome: Progressing  11/12/2023 0940 by Carole Packer RN  Outcome: Progressing

## 2023-11-14 LAB
ALBUMIN SERPL-MCNC: 3.4 G/DL (ref 3.5–5.2)
ALP SERPL-CCNC: 72 U/L (ref 35–104)
ALT SERPL-CCNC: 7 U/L (ref 0–32)
ANION GAP SERPL CALCULATED.3IONS-SCNC: 13 MMOL/L (ref 7–16)
ANION GAP SERPL CALCULATED.3IONS-SCNC: 17 MMOL/L (ref 7–16)
AST SERPL-CCNC: 19 U/L (ref 0–31)
BASOPHILS # BLD: 0.02 K/UL (ref 0–0.2)
BASOPHILS NFR BLD: 1 % (ref 0–2)
BILIRUB SERPL-MCNC: 0.2 MG/DL (ref 0–1.2)
BUN SERPL-MCNC: 27 MG/DL (ref 6–23)
BUN SERPL-MCNC: 28 MG/DL (ref 6–23)
CALCIUM SERPL-MCNC: 9.1 MG/DL (ref 8.6–10.2)
CALCIUM SERPL-MCNC: 9.4 MG/DL (ref 8.6–10.2)
CHLORIDE SERPL-SCNC: 96 MMOL/L (ref 98–107)
CHLORIDE SERPL-SCNC: 98 MMOL/L (ref 98–107)
CO2 SERPL-SCNC: 29 MMOL/L (ref 22–29)
CO2 SERPL-SCNC: 31 MMOL/L (ref 22–29)
CREAT SERPL-MCNC: 1.9 MG/DL (ref 0.5–1)
CREAT SERPL-MCNC: 2.1 MG/DL (ref 0.5–1)
EKG ATRIAL RATE: 73 BPM
EKG ATRIAL RATE: 83 BPM
EKG P AXIS: 12 DEGREES
EKG P AXIS: 48 DEGREES
EKG P-R INTERVAL: 142 MS
EKG P-R INTERVAL: 154 MS
EKG Q-T INTERVAL: 378 MS
EKG Q-T INTERVAL: 398 MS
EKG QRS DURATION: 78 MS
EKG QRS DURATION: 82 MS
EKG QTC CALCULATION (BAZETT): 438 MS
EKG QTC CALCULATION (BAZETT): 444 MS
EKG R AXIS: 92 DEGREES
EKG R AXIS: 93 DEGREES
EKG T AXIS: 109 DEGREES
EKG T AXIS: 84 DEGREES
EKG VENTRICULAR RATE: 73 BPM
EKG VENTRICULAR RATE: 83 BPM
EOSINOPHIL # BLD: 0.07 K/UL (ref 0.05–0.5)
EOSINOPHILS RELATIVE PERCENT: 2 % (ref 0–6)
ERYTHROCYTE [DISTWIDTH] IN BLOOD BY AUTOMATED COUNT: 14.9 % (ref 11.5–15)
GFR SERPL CREATININE-BSD FRML MDRD: 25 ML/MIN/1.73M2
GFR SERPL CREATININE-BSD FRML MDRD: 28 ML/MIN/1.73M2
GLUCOSE SERPL-MCNC: 100 MG/DL (ref 74–99)
GLUCOSE SERPL-MCNC: 99 MG/DL (ref 74–99)
HCT VFR BLD AUTO: 33.9 % (ref 34–48)
HGB BLD-MCNC: 10.5 G/DL (ref 11.5–15.5)
IMM GRANULOCYTES # BLD AUTO: <0.03 K/UL (ref 0–0.58)
IMM GRANULOCYTES NFR BLD: 0 % (ref 0–5)
LYMPHOCYTES NFR BLD: 0.77 K/UL (ref 1.5–4)
LYMPHOCYTES RELATIVE PERCENT: 20 % (ref 20–42)
MAGNESIUM SERPL-MCNC: 2.2 MG/DL (ref 1.6–2.6)
MCH RBC QN AUTO: 26.2 PG (ref 26–35)
MCHC RBC AUTO-ENTMCNC: 31 G/DL (ref 32–34.5)
MCV RBC AUTO: 84.5 FL (ref 80–99.9)
MONOCYTES NFR BLD: 0.39 K/UL (ref 0.1–0.95)
MONOCYTES NFR BLD: 10 % (ref 2–12)
NEUTROPHILS NFR BLD: 67 % (ref 43–80)
NEUTS SEG NFR BLD: 2.6 K/UL (ref 1.8–7.3)
PLATELET # BLD AUTO: 156 K/UL (ref 130–450)
PMV BLD AUTO: 12.3 FL (ref 7–12)
POTASSIUM SERPL-SCNC: 3.3 MMOL/L (ref 3.5–5)
POTASSIUM SERPL-SCNC: 3.9 MMOL/L (ref 3.5–5)
PROT SERPL-MCNC: 6.1 G/DL (ref 6.4–8.3)
RBC # BLD AUTO: 4.01 M/UL (ref 3.5–5.5)
SODIUM SERPL-SCNC: 140 MMOL/L (ref 132–146)
SODIUM SERPL-SCNC: 144 MMOL/L (ref 132–146)
WBC OTHER # BLD: 3.9 K/UL (ref 4.5–11.5)

## 2023-11-14 PROCEDURE — 80053 COMPREHEN METABOLIC PANEL: CPT

## 2023-11-14 PROCEDURE — 94640 AIRWAY INHALATION TREATMENT: CPT

## 2023-11-14 PROCEDURE — 94660 CPAP INITIATION&MGMT: CPT

## 2023-11-14 PROCEDURE — 6370000000 HC RX 637 (ALT 250 FOR IP)

## 2023-11-14 PROCEDURE — 6360000002 HC RX W HCPCS

## 2023-11-14 PROCEDURE — 83735 ASSAY OF MAGNESIUM: CPT

## 2023-11-14 PROCEDURE — 99232 SBSQ HOSP IP/OBS MODERATE 35: CPT | Performed by: INTERNAL MEDICINE

## 2023-11-14 PROCEDURE — 2580000003 HC RX 258: Performed by: INTERNAL MEDICINE

## 2023-11-14 PROCEDURE — 6370000000 HC RX 637 (ALT 250 FOR IP): Performed by: INTERNAL MEDICINE

## 2023-11-14 PROCEDURE — 36415 COLL VENOUS BLD VENIPUNCTURE: CPT

## 2023-11-14 PROCEDURE — 2700000000 HC OXYGEN THERAPY PER DAY

## 2023-11-14 PROCEDURE — 99232 SBSQ HOSP IP/OBS MODERATE 35: CPT | Performed by: FAMILY MEDICINE

## 2023-11-14 PROCEDURE — 80048 BASIC METABOLIC PNL TOTAL CA: CPT

## 2023-11-14 PROCEDURE — 85025 COMPLETE CBC W/AUTO DIFF WBC: CPT

## 2023-11-14 PROCEDURE — 2580000003 HC RX 258

## 2023-11-14 PROCEDURE — 1200000000 HC SEMI PRIVATE

## 2023-11-14 RX ORDER — SODIUM CHLORIDE 9 MG/ML
INJECTION, SOLUTION INTRAVENOUS CONTINUOUS
Status: DISCONTINUED | OUTPATIENT
Start: 2023-11-14 | End: 2023-11-16 | Stop reason: HOSPADM

## 2023-11-14 RX ADMIN — LEVOTHYROXINE SODIUM 50 MCG: 0.05 TABLET ORAL at 09:49

## 2023-11-14 RX ADMIN — DULOXETINE HYDROCHLORIDE 60 MG: 60 CAPSULE, DELAYED RELEASE ORAL at 09:48

## 2023-11-14 RX ADMIN — IPRATROPIUM BROMIDE AND ALBUTEROL SULFATE 1 DOSE: 2.5; .5 SOLUTION RESPIRATORY (INHALATION) at 16:00

## 2023-11-14 RX ADMIN — BUDESONIDE 500 MCG: 0.5 SUSPENSION RESPIRATORY (INHALATION) at 11:40

## 2023-11-14 RX ADMIN — SILDENAFIL 10 MG: 20 TABLET ORAL at 15:07

## 2023-11-14 RX ADMIN — WATER 1000 MG: 1 INJECTION INTRAMUSCULAR; INTRAVENOUS; SUBCUTANEOUS at 17:34

## 2023-11-14 RX ADMIN — DESMOPRESSIN ACETATE 200 MCG: 0.1 TABLET ORAL at 20:14

## 2023-11-14 RX ADMIN — METOPROLOL SUCCINATE 25 MG: 25 TABLET, EXTENDED RELEASE ORAL at 15:06

## 2023-11-14 RX ADMIN — ARFORMOTEROL TARTRATE 15 MCG: 15 SOLUTION RESPIRATORY (INHALATION) at 19:30

## 2023-11-14 RX ADMIN — PANTOPRAZOLE SODIUM 40 MG: 40 TABLET, DELAYED RELEASE ORAL at 06:34

## 2023-11-14 RX ADMIN — IPRATROPIUM BROMIDE AND ALBUTEROL SULFATE 1 DOSE: 2.5; .5 SOLUTION RESPIRATORY (INHALATION) at 11:38

## 2023-11-14 RX ADMIN — LEVETIRACETAM 500 MG: 500 TABLET, FILM COATED ORAL at 20:15

## 2023-11-14 RX ADMIN — APIXABAN 5 MG: 5 TABLET, FILM COATED ORAL at 09:48

## 2023-11-14 RX ADMIN — IPRATROPIUM BROMIDE AND ALBUTEROL SULFATE 1 DOSE: 2.5; .5 SOLUTION RESPIRATORY (INHALATION) at 19:30

## 2023-11-14 RX ADMIN — BUDESONIDE 500 MCG: 0.5 SUSPENSION RESPIRATORY (INHALATION) at 19:30

## 2023-11-14 RX ADMIN — METOPROLOL SUCCINATE 25 MG: 25 TABLET, EXTENDED RELEASE ORAL at 20:15

## 2023-11-14 RX ADMIN — DESMOPRESSIN ACETATE 200 MCG: 0.1 TABLET ORAL at 09:49

## 2023-11-14 RX ADMIN — HYDROCORTISONE 20 MG: 20 TABLET ORAL at 09:50

## 2023-11-14 RX ADMIN — APIXABAN 5 MG: 5 TABLET, FILM COATED ORAL at 20:13

## 2023-11-14 RX ADMIN — ESCITALOPRAM OXALATE 10 MG: 10 TABLET ORAL at 09:48

## 2023-11-14 RX ADMIN — ACETAMINOPHEN 650 MG: 325 TABLET ORAL at 05:12

## 2023-11-14 RX ADMIN — ACETAMINOPHEN 650 MG: 325 TABLET ORAL at 14:50

## 2023-11-14 RX ADMIN — ARFORMOTEROL TARTRATE 15 MCG: 15 SOLUTION RESPIRATORY (INHALATION) at 11:39

## 2023-11-14 RX ADMIN — SODIUM CHLORIDE: 9 INJECTION, SOLUTION INTRAVENOUS at 15:19

## 2023-11-14 RX ADMIN — LEVETIRACETAM 500 MG: 500 TABLET, FILM COATED ORAL at 09:48

## 2023-11-14 RX ADMIN — SILDENAFIL 10 MG: 20 TABLET ORAL at 20:15

## 2023-11-14 RX ADMIN — HYDROCORTISONE 10 MG: 5 TABLET ORAL at 17:35

## 2023-11-14 RX ADMIN — SODIUM CHLORIDE, PRESERVATIVE FREE 10 ML: 5 INJECTION INTRAVENOUS at 09:49

## 2023-11-14 ASSESSMENT — PAIN SCALES - GENERAL
PAINLEVEL_OUTOF10: 0
PAINLEVEL_OUTOF10: 5

## 2023-11-14 NOTE — PLAN OF CARE
Problem: Pain  Goal: Verbalizes/displays adequate comfort level or baseline comfort level  Outcome: Progressing     Problem: Skin/Tissue Integrity  Goal: Absence of new skin breakdown  Description: 1. Monitor for areas of redness and/or skin breakdown  2. Assess vascular access sites hourly  3. Every 4-6 hours minimum:  Change oxygen saturation probe site  4. Every 4-6 hours:  If on nasal continuous positive airway pressure, respiratory therapy assess nares and determine need for appliance change or resting period.   Outcome: Progressing     Problem: Safety - Adult  Goal: Free from fall injury  Outcome: Progressing     Problem: ABCDS Injury Assessment  Goal: Absence of physical injury  Outcome: Progressing     Problem: Nutrition Deficit:  Goal: Optimize nutritional status  Outcome: Progressing     Problem: Chronic Conditions and Co-morbidities  Goal: Patient's chronic conditions and co-morbidity symptoms are monitored and maintained or improved  Outcome: Progressing

## 2023-11-14 NOTE — CARE COORDINATION
Per Keena from [facility], patient is active with them. PT/OT [facility] 7/24 and 14/24. Patient has a recent Hx at [facility]. I met with patient in room to discuss therapy evals and transition of care. She said she will consider Ana but wants to talk to her  about it first and will then let me know. Victoria Gilbert RN CM  [phone]      I met with patient and  in room to discuss transition of care.  said the plan is for patient to return home with [facility] when medically ready. He said that he is the 24hr/7 caregiver for his wife. They live in a 1 story home with ramp to enter. Bathroom setup: tub/shower unit with shower chair. Equipment owned: w/c, johnny, BSC, S.C.  said his wife has home O2 6 liters and nebulizer through [facility]. Patient is currently on O2 4 liters with O2 sat 99%. Home health care orders are in.  said his wife will need an ambulance transport home. Ambulance form in envelope in soft chart will need to be signed and dated by nursing on day of discharge.   Victoria Gilbert RN CM  [phone]

## 2023-11-15 LAB
ALBUMIN SERPL-MCNC: 3.2 G/DL (ref 3.5–5.2)
ALP SERPL-CCNC: 69 U/L (ref 35–104)
ALT SERPL-CCNC: 7 U/L (ref 0–32)
ANION GAP SERPL CALCULATED.3IONS-SCNC: 15 MMOL/L (ref 7–16)
ANION GAP SERPL CALCULATED.3IONS-SCNC: 8 MMOL/L (ref 7–16)
AST SERPL-CCNC: 19 U/L (ref 0–31)
BASOPHILS # BLD: 0.01 K/UL (ref 0–0.2)
BASOPHILS NFR BLD: 0 % (ref 0–2)
BILIRUB SERPL-MCNC: 0.2 MG/DL (ref 0–1.2)
BUN SERPL-MCNC: 25 MG/DL (ref 6–23)
BUN SERPL-MCNC: 26 MG/DL (ref 6–23)
CALCIUM SERPL-MCNC: 8.4 MG/DL (ref 8.6–10.2)
CALCIUM SERPL-MCNC: 8.6 MG/DL (ref 8.6–10.2)
CHLORIDE SERPL-SCNC: 100 MMOL/L (ref 98–107)
CHLORIDE SERPL-SCNC: 100 MMOL/L (ref 98–107)
CO2 SERPL-SCNC: 25 MMOL/L (ref 22–29)
CO2 SERPL-SCNC: 30 MMOL/L (ref 22–29)
CREAT SERPL-MCNC: 1.5 MG/DL (ref 0.5–1)
CREAT SERPL-MCNC: 1.6 MG/DL (ref 0.5–1)
EOSINOPHIL # BLD: 0.07 K/UL (ref 0.05–0.5)
EOSINOPHILS RELATIVE PERCENT: 2 % (ref 0–6)
ERYTHROCYTE [DISTWIDTH] IN BLOOD BY AUTOMATED COUNT: 15 % (ref 11.5–15)
GFR SERPL CREATININE-BSD FRML MDRD: 37 ML/MIN/1.73M2
GFR SERPL CREATININE-BSD FRML MDRD: 38 ML/MIN/1.73M2
GLUCOSE SERPL-MCNC: 86 MG/DL (ref 74–99)
GLUCOSE SERPL-MCNC: 86 MG/DL (ref 74–99)
HCT VFR BLD AUTO: 32 % (ref 34–48)
HGB BLD-MCNC: 9.8 G/DL (ref 11.5–15.5)
IMM GRANULOCYTES # BLD AUTO: <0.03 K/UL (ref 0–0.58)
IMM GRANULOCYTES NFR BLD: 0 % (ref 0–5)
LYMPHOCYTES NFR BLD: 0.56 K/UL (ref 1.5–4)
LYMPHOCYTES RELATIVE PERCENT: 19 % (ref 20–42)
MCH RBC QN AUTO: 25.9 PG (ref 26–35)
MCHC RBC AUTO-ENTMCNC: 30.6 G/DL (ref 32–34.5)
MCV RBC AUTO: 84.4 FL (ref 80–99.9)
MONOCYTES NFR BLD: 0.36 K/UL (ref 0.1–0.95)
MONOCYTES NFR BLD: 12 % (ref 2–12)
NEUTROPHILS NFR BLD: 67 % (ref 43–80)
NEUTS SEG NFR BLD: 2.01 K/UL (ref 1.8–7.3)
PLATELET # BLD AUTO: 142 K/UL (ref 130–450)
PMV BLD AUTO: 11.2 FL (ref 7–12)
POTASSIUM SERPL-SCNC: 4.1 MMOL/L (ref 3.5–5)
POTASSIUM SERPL-SCNC: 4.6 MMOL/L (ref 3.5–5)
PROT SERPL-MCNC: 6 G/DL (ref 6.4–8.3)
RBC # BLD AUTO: 3.79 M/UL (ref 3.5–5.5)
RBC # BLD: ABNORMAL 10*6/UL
SODIUM SERPL-SCNC: 138 MMOL/L (ref 132–146)
SODIUM SERPL-SCNC: 140 MMOL/L (ref 132–146)
WBC OTHER # BLD: 3 K/UL (ref 4.5–11.5)

## 2023-11-15 PROCEDURE — 80053 COMPREHEN METABOLIC PANEL: CPT

## 2023-11-15 PROCEDURE — 6370000000 HC RX 637 (ALT 250 FOR IP): Performed by: STUDENT IN AN ORGANIZED HEALTH CARE EDUCATION/TRAINING PROGRAM

## 2023-11-15 PROCEDURE — 80048 BASIC METABOLIC PNL TOTAL CA: CPT

## 2023-11-15 PROCEDURE — 94640 AIRWAY INHALATION TREATMENT: CPT

## 2023-11-15 PROCEDURE — 2580000003 HC RX 258

## 2023-11-15 PROCEDURE — 94660 CPAP INITIATION&MGMT: CPT

## 2023-11-15 PROCEDURE — 85025 COMPLETE CBC W/AUTO DIFF WBC: CPT

## 2023-11-15 PROCEDURE — 6370000000 HC RX 637 (ALT 250 FOR IP): Performed by: INTERNAL MEDICINE

## 2023-11-15 PROCEDURE — 36415 COLL VENOUS BLD VENIPUNCTURE: CPT

## 2023-11-15 PROCEDURE — 6360000002 HC RX W HCPCS

## 2023-11-15 PROCEDURE — 2700000000 HC OXYGEN THERAPY PER DAY

## 2023-11-15 PROCEDURE — 1200000000 HC SEMI PRIVATE

## 2023-11-15 PROCEDURE — 6370000000 HC RX 637 (ALT 250 FOR IP)

## 2023-11-15 PROCEDURE — 2580000003 HC RX 258: Performed by: INTERNAL MEDICINE

## 2023-11-15 PROCEDURE — 51798 US URINE CAPACITY MEASURE: CPT

## 2023-11-15 PROCEDURE — 99232 SBSQ HOSP IP/OBS MODERATE 35: CPT | Performed by: INTERNAL MEDICINE

## 2023-11-15 RX ORDER — AMIODARONE HYDROCHLORIDE 200 MG/1
200 TABLET ORAL DAILY
Status: DISCONTINUED | OUTPATIENT
Start: 2023-11-15 | End: 2023-11-16 | Stop reason: HOSPADM

## 2023-11-15 RX ADMIN — SODIUM CHLORIDE: 9 INJECTION, SOLUTION INTRAVENOUS at 23:38

## 2023-11-15 RX ADMIN — ARFORMOTEROL TARTRATE 15 MCG: 15 SOLUTION RESPIRATORY (INHALATION) at 08:28

## 2023-11-15 RX ADMIN — IPRATROPIUM BROMIDE AND ALBUTEROL SULFATE 1 DOSE: 2.5; .5 SOLUTION RESPIRATORY (INHALATION) at 16:16

## 2023-11-15 RX ADMIN — BUDESONIDE 500 MCG: 0.5 SUSPENSION RESPIRATORY (INHALATION) at 08:28

## 2023-11-15 RX ADMIN — AMIODARONE HYDROCHLORIDE 200 MG: 200 TABLET ORAL at 16:31

## 2023-11-15 RX ADMIN — HYDROCORTISONE 20 MG: 20 TABLET ORAL at 09:19

## 2023-11-15 RX ADMIN — SODIUM CHLORIDE, PRESERVATIVE FREE 10 ML: 5 INJECTION INTRAVENOUS at 19:43

## 2023-11-15 RX ADMIN — DESMOPRESSIN ACETATE 200 MCG: 0.1 TABLET ORAL at 19:46

## 2023-11-15 RX ADMIN — SILDENAFIL 10 MG: 20 TABLET ORAL at 19:47

## 2023-11-15 RX ADMIN — ESCITALOPRAM OXALATE 10 MG: 10 TABLET ORAL at 08:22

## 2023-11-15 RX ADMIN — SILDENAFIL 10 MG: 20 TABLET ORAL at 14:26

## 2023-11-15 RX ADMIN — SODIUM CHLORIDE, PRESERVATIVE FREE 10 ML: 5 INJECTION INTRAVENOUS at 08:23

## 2023-11-15 RX ADMIN — DULOXETINE HYDROCHLORIDE 60 MG: 60 CAPSULE, DELAYED RELEASE ORAL at 08:22

## 2023-11-15 RX ADMIN — APIXABAN 5 MG: 5 TABLET, FILM COATED ORAL at 08:23

## 2023-11-15 RX ADMIN — IPRATROPIUM BROMIDE AND ALBUTEROL SULFATE 1 DOSE: 2.5; .5 SOLUTION RESPIRATORY (INHALATION) at 11:57

## 2023-11-15 RX ADMIN — PANTOPRAZOLE SODIUM 40 MG: 40 TABLET, DELAYED RELEASE ORAL at 06:17

## 2023-11-15 RX ADMIN — METOPROLOL SUCCINATE 25 MG: 25 TABLET, EXTENDED RELEASE ORAL at 19:44

## 2023-11-15 RX ADMIN — ACETAMINOPHEN 650 MG: 325 TABLET ORAL at 09:22

## 2023-11-15 RX ADMIN — LEVETIRACETAM 500 MG: 500 TABLET, FILM COATED ORAL at 08:22

## 2023-11-15 RX ADMIN — IPRATROPIUM BROMIDE AND ALBUTEROL SULFATE 1 DOSE: 2.5; .5 SOLUTION RESPIRATORY (INHALATION) at 08:28

## 2023-11-15 RX ADMIN — APIXABAN 5 MG: 5 TABLET, FILM COATED ORAL at 19:44

## 2023-11-15 RX ADMIN — HYDROCORTISONE 10 MG: 5 TABLET ORAL at 17:23

## 2023-11-15 RX ADMIN — ACETAMINOPHEN 650 MG: 325 TABLET ORAL at 02:55

## 2023-11-15 RX ADMIN — DESMOPRESSIN ACETATE 200 MCG: 0.1 TABLET ORAL at 09:18

## 2023-11-15 RX ADMIN — ACETAMINOPHEN 650 MG: 325 TABLET ORAL at 16:31

## 2023-11-15 RX ADMIN — SILDENAFIL 10 MG: 20 TABLET ORAL at 09:20

## 2023-11-15 RX ADMIN — METOPROLOL SUCCINATE 25 MG: 25 TABLET, EXTENDED RELEASE ORAL at 08:22

## 2023-11-15 RX ADMIN — LEVETIRACETAM 500 MG: 500 TABLET, FILM COATED ORAL at 19:44

## 2023-11-15 RX ADMIN — LEVOTHYROXINE SODIUM 50 MCG: 0.05 TABLET ORAL at 08:22

## 2023-11-15 ASSESSMENT — PAIN DESCRIPTION - LOCATION: LOCATION: SHOULDER

## 2023-11-15 ASSESSMENT — PAIN SCALES - GENERAL
PAINLEVEL_OUTOF10: 5
PAINLEVEL_OUTOF10: 0

## 2023-11-15 NOTE — CONSULTS
Department of Internal Medicine  Nephrology Attending Consult Note      Reason for Consult: IRMA on CKD  Requesting Physician:  Wes Ocampo MD    CHIEF COMPLAINT: Admitted with fatigue and weakness    History Obtained From:  patient, electronic medical record    HISTORY OF PRESENT ILLNESS:  Briefly Mrs. Eliot Mendez is a 78 y.o. female, well-known to our practice, who follows regularly with Dr. 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 On admission her creatinine level was 1.4 mg/dL but since November 13 had a brisk increase up to 1.9 mg/L which has continued to increase to 2.1 mg/dL today, reason for this consultation. Review of her records showed that she had a significant episode of hypotension on November 12 associated with administration of amlodipine. Her baseline creatinine is 0.8-1 mg/dL.     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
ENDOCRINOLOGY INITIAL CONSULTATION NOTE    Date of Admission: 11/10/2023  Date of Service: 11/13/2023  Admitting Physician: July Landry MD   Primary Care Physician: Nini Mendoza MD  Consultant physician: Camellia Bosworth MD     Reason for the consultation:  Hypopituitarism, central diabetes insipidus, secondary adrenal insufficiency    History of Present Illness:  Prabhjot Farrar is a 78 y.o. old female with PMH of congestive heart failure, sarcoidosis, A-fib, CKD and other listed below admitted to [facility] on 11/10/2023 because of generalized weakness and fatigue, endocrine service was consulted for evaluation and management of adrenal insufficiency  Patient was in his in her usual state of health until about 5 days ago when started complaining of worsening fatigue and tiredness. Her symptoms worsened to the point started affecting her daily activity. She also noticed dyspnea on exertion and abdominal discomfort along with diarrhea. The patient denies history of fever, chest pain, palpitations, nausea, or headaches    Upon arrival to the emergency department initial work-up was negative apart from evidence of UTI on the UA. The patient was diagnosed with sarcoidosis in 1998 and has been on steroids since the diagnosis. I have reviewed all previous records and she has been anywhere between 5 mg to 80 mg of prednisone for years. Apparently, prednisone was stopped prior to this admission for an unclear duration.        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
Recent Labs     11/14/23  0442 11/14/23  1542 11/15/23  0438    140 138   K 3.9 3.3* 4.1   CL 98 96* 100   CO2 29 31* 30*   BUN 28* 27* 25*   CREATININE 2.1* 1.9* 1.5*   CALCIUM 9.1 9.4 8.4*      Lab Results   Component Value Date/Time    MG 2.2 11/14/2023 03:42 PM     No results for input(s): \"TSH\" in the last 72 hours. No results for input(s): \"INR\" in the last 72 hours. CXR 11/12/2023:   1. Mild cardiomegaly     2. Stable reticular prominence in the lungs and basilar atelectasis     3. Right convexity kyphoscoliosis. Telemetry: Sinus with recurrent AF from 1208 to 1758 with a rate of 113 bpm then back to sinus with rates in the 80's     EKG: sinus  rates 83 bpm, QRS 78ms QTc 444 Please see scan in Cardiology. I have independently reviewed all of the ECGs and rhythm strips per above     Assessment/Plan:     1. Paroxysmal atrial fibrillation, without prior DCCV, ablation with prior rhythm control attempts including Multaq and amiodarone therapy.  -Atrial fibrillation first noted 11/03/2016.  -Initially placed on Multaq however this was discontinued in August 2017. -IV amiodarone started May 2013 when admitted for acute on chronic HFpEF, respiratory distress eventually discharged on p.o. and maintained on p.o. amiodarone due to frequent PAC recurrences until seen at [facility] September 2023 where it is unclear if she disclosed to her use of amiodarone, [facility] rec was completed. - CHADS-VASc 5 (age, gender, CHF, PE)  - OAC: Eliquis 5mg BID (age 78, Wt 68.9 Creat 1.5)  - Rate control Toprol 25mg BID. 2.  Pulmonary sarcoidosis stage IV initially diagnosed in 2007, on steroids for suppression with cardiac MRI 2016 nuclear for cardiac involvement.  -Sildenafil    3. PAH who group 2 and 3  -Previously noted RV dysfunction. 4.  Diabetes insipidus secondary to sarcoidosis    6. IRMA on CKD secondary to hypotension currently on IV fluids per nephrology.         Recommendations:  Consider

## 2023-11-15 NOTE — CARE COORDINATION
CM update note. D/c plan is home with [facility]. PA orders already in place. Patient  provides care 27/7. Pt is on home oxygen at 6 L NC. Currently on 4 L NC. Nephrology and endocrine following. EP consulted for a-fib. Creatine trending down. IVF decreased to 75 ml/hr. Bladder scan ordered. Pt will need ambulance transport home at d/c. Ambulance form is on the soft chart. CM/SW to follow. Emily Caldwell RN \[facility] [place] [facility]\"" [phone].

## 2023-11-16 VITALS
RESPIRATION RATE: 16 BRPM | OXYGEN SATURATION: 98 % | HEART RATE: 72 BPM | HEIGHT: 62 IN | SYSTOLIC BLOOD PRESSURE: 148 MMHG | BODY MASS INDEX: 26.68 KG/M2 | WEIGHT: 145 LBS | DIASTOLIC BLOOD PRESSURE: 92 MMHG | TEMPERATURE: 97.9 F

## 2023-11-16 LAB
ALBUMIN SERPL-MCNC: 3.2 G/DL (ref 3.5–5.2)
ALP SERPL-CCNC: 66 U/L (ref 35–104)
ALT SERPL-CCNC: 8 U/L (ref 0–32)
ANION GAP SERPL CALCULATED.3IONS-SCNC: 9 MMOL/L (ref 7–16)
AST SERPL-CCNC: 23 U/L (ref 0–31)
BASOPHILS # BLD: 0.02 K/UL (ref 0–0.2)
BASOPHILS NFR BLD: 1 % (ref 0–2)
BILIRUB SERPL-MCNC: 0.2 MG/DL (ref 0–1.2)
BUN SERPL-MCNC: 22 MG/DL (ref 6–23)
CALCIUM SERPL-MCNC: 8.5 MG/DL (ref 8.6–10.2)
CHLORIDE SERPL-SCNC: 105 MMOL/L (ref 98–107)
CO2 SERPL-SCNC: 26 MMOL/L (ref 22–29)
CREAT SERPL-MCNC: 1.1 MG/DL (ref 0.5–1)
EOSINOPHIL # BLD: 0.06 K/UL (ref 0.05–0.5)
EOSINOPHILS RELATIVE PERCENT: 2 % (ref 0–6)
ERYTHROCYTE [DISTWIDTH] IN BLOOD BY AUTOMATED COUNT: 15.1 % (ref 11.5–15)
GFR SERPL CREATININE-BSD FRML MDRD: 59 ML/MIN/1.73M2
GLUCOSE SERPL-MCNC: 77 MG/DL (ref 74–99)
HCT VFR BLD AUTO: 32 % (ref 34–48)
HGB BLD-MCNC: 9.3 G/DL (ref 11.5–15.5)
IMM GRANULOCYTES # BLD AUTO: <0.03 K/UL (ref 0–0.58)
IMM GRANULOCYTES NFR BLD: 1 % (ref 0–5)
LYMPHOCYTES NFR BLD: 0.73 K/UL (ref 1.5–4)
LYMPHOCYTES RELATIVE PERCENT: 21 % (ref 20–42)
MCH RBC QN AUTO: 25.5 PG (ref 26–35)
MCHC RBC AUTO-ENTMCNC: 29.1 G/DL (ref 32–34.5)
MCV RBC AUTO: 87.7 FL (ref 80–99.9)
MICROORGANISM SPEC CULT: NORMAL
MICROORGANISM SPEC CULT: NORMAL
MONOCYTES NFR BLD: 0.35 K/UL (ref 0.1–0.95)
MONOCYTES NFR BLD: 10 % (ref 2–12)
NEUTROPHILS NFR BLD: 66 % (ref 43–80)
NEUTS SEG NFR BLD: 2.28 K/UL (ref 1.8–7.3)
PLATELET # BLD AUTO: 152 K/UL (ref 130–450)
PMV BLD AUTO: 12.9 FL (ref 7–12)
POTASSIUM SERPL-SCNC: 3.9 MMOL/L (ref 3.5–5)
PROT SERPL-MCNC: 5.7 G/DL (ref 6.4–8.3)
RBC # BLD AUTO: 3.65 M/UL (ref 3.5–5.5)
SERVICE CMNT-IMP: NORMAL
SERVICE CMNT-IMP: NORMAL
SODIUM SERPL-SCNC: 140 MMOL/L (ref 132–146)
SPECIMEN DESCRIPTION: NORMAL
SPECIMEN DESCRIPTION: NORMAL
WBC OTHER # BLD: 3.5 K/UL (ref 4.5–11.5)

## 2023-11-16 PROCEDURE — 2700000000 HC OXYGEN THERAPY PER DAY

## 2023-11-16 PROCEDURE — 99231 SBSQ HOSP IP/OBS SF/LOW 25: CPT | Performed by: INTERNAL MEDICINE

## 2023-11-16 PROCEDURE — 94660 CPAP INITIATION&MGMT: CPT

## 2023-11-16 PROCEDURE — 6360000002 HC RX W HCPCS

## 2023-11-16 PROCEDURE — 85025 COMPLETE CBC W/AUTO DIFF WBC: CPT

## 2023-11-16 PROCEDURE — 80053 COMPREHEN METABOLIC PANEL: CPT

## 2023-11-16 PROCEDURE — 6370000000 HC RX 637 (ALT 250 FOR IP)

## 2023-11-16 PROCEDURE — 6370000000 HC RX 637 (ALT 250 FOR IP): Performed by: STUDENT IN AN ORGANIZED HEALTH CARE EDUCATION/TRAINING PROGRAM

## 2023-11-16 PROCEDURE — 36415 COLL VENOUS BLD VENIPUNCTURE: CPT

## 2023-11-16 PROCEDURE — 94640 AIRWAY INHALATION TREATMENT: CPT

## 2023-11-16 PROCEDURE — 6370000000 HC RX 637 (ALT 250 FOR IP): Performed by: INTERNAL MEDICINE

## 2023-11-16 RX ORDER — HYDROCORTISONE 5 MG/1
15 TABLET ORAL EVERY MORNING
Qty: 90 TABLET | Refills: 3 | Status: SHIPPED | OUTPATIENT
Start: 2023-11-16 | End: 2024-03-15

## 2023-11-16 RX ORDER — HYDROCORTISONE 5 MG/1
5 TABLET ORAL NIGHTLY
Qty: 30 TABLET | Refills: 3 | Status: SHIPPED | OUTPATIENT
Start: 2023-11-16

## 2023-11-16 RX ORDER — LEVOTHYROXINE SODIUM 0.05 MG/1
50 TABLET ORAL DAILY
Qty: 30 TABLET | Refills: 3 | Status: SHIPPED | OUTPATIENT
Start: 2023-11-17

## 2023-11-16 RX ORDER — GUAIFENESIN 400 MG/1
400 TABLET ORAL PRN
Qty: 30 TABLET | Refills: 0 | Status: SHIPPED | OUTPATIENT
Start: 2023-11-16

## 2023-11-16 RX ORDER — FERROUS SULFATE 325(65) MG
325 TABLET ORAL
Qty: 30 TABLET | Refills: 1 | Status: SHIPPED | OUTPATIENT
Start: 2023-11-16

## 2023-11-16 RX ADMIN — PANTOPRAZOLE SODIUM 40 MG: 40 TABLET, DELAYED RELEASE ORAL at 05:41

## 2023-11-16 RX ADMIN — APIXABAN 5 MG: 5 TABLET, FILM COATED ORAL at 08:49

## 2023-11-16 RX ADMIN — ACETAMINOPHEN 650 MG: 325 TABLET ORAL at 08:56

## 2023-11-16 RX ADMIN — DESMOPRESSIN ACETATE 200 MCG: 0.1 TABLET ORAL at 08:49

## 2023-11-16 RX ADMIN — LEVETIRACETAM 500 MG: 500 TABLET, FILM COATED ORAL at 08:50

## 2023-11-16 RX ADMIN — IPRATROPIUM BROMIDE AND ALBUTEROL SULFATE 1 DOSE: 2.5; .5 SOLUTION RESPIRATORY (INHALATION) at 12:14

## 2023-11-16 RX ADMIN — METOPROLOL SUCCINATE 25 MG: 25 TABLET, EXTENDED RELEASE ORAL at 08:51

## 2023-11-16 RX ADMIN — BUDESONIDE 500 MCG: 0.5 SUSPENSION RESPIRATORY (INHALATION) at 07:44

## 2023-11-16 RX ADMIN — SILDENAFIL 10 MG: 20 TABLET ORAL at 08:49

## 2023-11-16 RX ADMIN — IPRATROPIUM BROMIDE AND ALBUTEROL SULFATE 1 DOSE: 2.5; .5 SOLUTION RESPIRATORY (INHALATION) at 07:44

## 2023-11-16 RX ADMIN — ESCITALOPRAM OXALATE 10 MG: 10 TABLET ORAL at 08:49

## 2023-11-16 RX ADMIN — LEVOTHYROXINE SODIUM 50 MCG: 0.05 TABLET ORAL at 08:49

## 2023-11-16 RX ADMIN — ACETAMINOPHEN 650 MG: 325 TABLET ORAL at 19:52

## 2023-11-16 RX ADMIN — SILDENAFIL 10 MG: 20 TABLET ORAL at 18:09

## 2023-11-16 RX ADMIN — AMIODARONE HYDROCHLORIDE 200 MG: 200 TABLET ORAL at 08:53

## 2023-11-16 RX ADMIN — HYDROCORTISONE 20 MG: 20 TABLET ORAL at 08:50

## 2023-11-16 RX ADMIN — ARFORMOTEROL TARTRATE 15 MCG: 15 SOLUTION RESPIRATORY (INHALATION) at 07:44

## 2023-11-16 RX ADMIN — HYDROCORTISONE 10 MG: 5 TABLET ORAL at 18:08

## 2023-11-16 RX ADMIN — DULOXETINE HYDROCHLORIDE 60 MG: 60 CAPSULE, DELAYED RELEASE ORAL at 08:51

## 2023-11-16 ASSESSMENT — PAIN DESCRIPTION - LOCATION: LOCATION: CHEST

## 2023-11-16 ASSESSMENT — PAIN SCALES - GENERAL: PAINLEVEL_OUTOF10: 8

## 2023-11-16 NOTE — DISCHARGE SUMMARY
is seen in the aorta. No aneurysm. No lymphadenopathy. No free air or free fluid is seen. Bones/Soft Tissues: Chronic compression fracture deformities are in the lower thoracic spine. Complete loss of disc height at L4-5.     1.  No acute abnormality. 2. Severe distal colonic diverticulosis without diverticulitis. 3. Large ventral hernia containing multiple loops of bowel. 4. Bladder diverticulum. CT Head W/O Contrast    Result Date: 11/10/2023  EXAMINATION: CT OF THE HEAD WITHOUT CONTRAST  11/10/2023 5:14 pm TECHNIQUE: CT of the head was performed without the administration of intravenous contrast. Automated exposure control, iterative reconstruction, and/or weight based adjustment of the mA/kV was utilized to reduce the radiation dose to as low as reasonably achievable. COMPARISON: CT head without contrast, 09/06/2023 HISTORY: ORDERING SYSTEM PROVIDED HISTORY: Fatigue, Weakness TECHNOLOGIST PROVIDED HISTORY: Reason for exam:->Fatigue, Weakness Has a \"code stroke\" or \"stroke alert\" been called? ->No Decision Support Exception - unselect if not a suspected or confirmed emergency medical condition->Emergency Medical Condition (MA) What reading provider will be dictating this exam?->CRC FINDINGS: BRAIN/VENTRICLES: No mass effect, edema or hemorrhage is seen. Mild-to-moderate cerebral volume loss. Moderate chronic microvascular ischemic changes. The sylvian fissures appears somewhat more prominent than would be expected from the degree of cerebral atrophy. No hydrocephalus or extra-axial fluid is seen. ORBITS: The visualized portion of the orbits demonstrate no acute abnormality. SINUSES: The visualized paranasal sinuses and mastoid air cells demonstrate no acute abnormality. SOFT TISSUES/SKULL:  No acute abnormality of the visualized skull or soft tissues. 1.  No acute intracranial abnormality. 2. Mild-to-moderate cerebral volume loss with moderate chronic microvascular ischemic changes.  3. Sylvian

## 2023-11-16 NOTE — PLAN OF CARE
Problem: Pain  Goal: Verbalizes/displays adequate comfort level or baseline comfort level  Outcome: Adequate for Discharge     Problem: Skin/Tissue Integrity  Goal: Absence of new skin breakdown  Description: 1. Monitor for areas of redness and/or skin breakdown  2. Assess vascular access sites hourly  3. Every 4-6 hours minimum:  Change oxygen saturation probe site  4. Every 4-6 hours:  If on nasal continuous positive airway pressure, respiratory therapy assess nares and determine need for appliance change or resting period.   Outcome: Adequate for Discharge     Problem: Safety - Adult  Goal: Free from fall injury  Outcome: Adequate for Discharge     Problem: ABCDS Injury Assessment  Goal: Absence of physical injury  Outcome: Adequate for Discharge     Problem: Nutrition Deficit:  Goal: Optimize nutritional status  Outcome: Adequate for Discharge     Problem: Chronic Conditions and Co-morbidities  Goal: Patient's chronic conditions and co-morbidity symptoms are monitored and maintained or improved  Outcome: Adequate for Discharge

## 2023-11-16 NOTE — PLAN OF CARE
Problem: Safety - Adult  Goal: Free from fall injury  11/15/2023 2255 by Aracely Chambers RN  Outcome: Progressing     Problem: Skin/Tissue Integrity  Goal: Absence of new skin breakdown  Description: 1. Monitor for areas of redness and/or skin breakdown  2. Assess vascular access sites hourly  3. Every 4-6 hours minimum:  Change oxygen saturation probe site  4. Every 4-6 hours:  If on nasal continuous positive airway pressure, respiratory therapy assess nares and determine need for appliance change or resting period.   11/15/2023 2255 by Aracely Chambers RN  Outcome: Progressing     Problem: ABCDS Injury Assessment  Goal: Absence of physical injury  11/15/2023 2255 by Aracely Chambers RN  Outcome: Progressing

## 2023-11-16 NOTE — CARE COORDINATION
Discharge order noted. Per Nephrology note today, Okay to discharge from renal point of view. Transportation set up via [employer] for 6 pm today to the patent's home address. Charge nurse, RN, patient and  all notified. Keena from [address] [place] [address] notified of patient discharging home today. Home health care orders are in. Ambulance form in envelope in soft chart will need to be signed and dated by nursing.    Genesis Hagen RN CM  [phone]

## 2023-11-16 NOTE — DISCHARGE INSTRUCTIONS
DISCHARGE INSTRUCTIONS - FAMILY MEDICINE    Follow up at the [facility]). If there are any issues and cannot present to your appointment, please contact us at [phone] and we will schedule a new appointment for you. Future Appointments   Date Time Provider [address]   11/20/2023  2:20 PM Ramya Seo MD 1495 [facility]   11/21/2023 10:20 AM [facility]   11/29/2023 12:15 PM Laura Olivas MD [facility]:  [address], [address] [facility]         Phone: [phone]    Once discharged from [facility], you can :    Return to work : Yes, you may return to work  Activity : As tolerated  Stairs : As tolerated  Exercise : As tolerated  Lifting : As tolerated   Sexual activity : Yes  Driving : With seat belt on. NO driving on narcotic pain medication if prescribed   Medications : Always take your medications as prescribed  Diet : You are asked to make an attempt to improve diet and exercise patterns to aid in medical management of your medical condition/problem. Call [facility] with any further questions. Return to Emergency Department with any worsening of your condition and/or fever greater than 101 degrees, new weakness, shortness of breath or chest pain.  ______________________________________________________________________    =====================================   [facility], [address]   =====================================   Take your medications as directed in this summary     Follow up with all your future appointments as advised   Call [phone] to confirm your appointment with [facility]) as soon as possible and/or if there are any appointment changes or other issues. It is important that you follow up with us for better monitoring of the current cause of your hospitalization.  Follow up as well with the specialists

## 2023-11-16 NOTE — CARE COORDINATION
Plan is home with  and [facility] when medically ready. Resumption of home health care orders are in. Per internal med note today, dc today if okay with nephrology. RN is checking for discharge with nephrology.  is the 24hr/7 caregiver for his wife. They live in a 1 story home with ramp to enter. Bathroom setup: tub/shower unit with shower chair. Equipment owned: w/c, ww, BSC, S.C.  said his wife has home O2 6 liters and nebulizer through [employer]. Patient is currently on O2 4 liters with O2 sat 100%.  said his wife will need an ambulance transport home. Ambulance form in envelope in soft chart will need to be signed and dated by nursing on day of discharge.    Flex Cruz RN CM  [phone]

## 2023-11-17 ENCOUNTER — TELEPHONE (OUTPATIENT)
Dept: FAMILY MEDICINE CLINIC | Age: 68
End: 2023-11-17

## 2023-11-17 LAB
EKG ATRIAL RATE: 108 BPM
EKG Q-T INTERVAL: 356 MS
EKG QRS DURATION: 86 MS
EKG QTC CALCULATION (BAZETT): 449 MS
EKG R AXIS: 83 DEGREES
EKG T AXIS: -155 DEGREES
EKG VENTRICULAR RATE: 96 BPM

## 2023-11-17 NOTE — TELEPHONE ENCOUNTER
Care Transitions Initial Follow Up Call    Outreach made within 2 business days of discharge: Yes    Patient: Lissett Sullivan Patient : 1955   MRN: [id number]  Reason for Admission: There are no discharge diagnoses documented for the most recent discharge.   Discharge Date: 23       Spoke with: message left for patient    Discharge department/facility: [facility] left requesting return call and reminding of appt on 23    Scheduled appointment with PCP within 7-14 days    Follow Up  Future Appointments   Date Time Provider [address] [place] [address]   2023  2:20 PM Sommer Amaro MD [address] [place] [address]   2023 10:20 AM MD Nereida Link [facility]   2023 12:15 PM Rogelio Armendariz MD [facility]       Cristian Green RN

## 2023-11-17 NOTE — TELEPHONE ENCOUNTER
Patient is not able to keep the Tuesday 10:20 am appt .  She cannot do am appointments  She would like an afternoon appt as she will need some medications

## 2023-11-20 ENCOUNTER — OFFICE VISIT (OUTPATIENT)
Dept: CARDIOLOGY CLINIC | Age: 68
End: 2023-11-20
Payer: MEDICARE

## 2023-11-20 ENCOUNTER — TELEPHONE (OUTPATIENT)
Dept: FAMILY MEDICINE CLINIC | Age: 68
End: 2023-11-20

## 2023-11-20 ENCOUNTER — TELEPHONE (OUTPATIENT)
Dept: CARDIOLOGY CLINIC | Age: 68
End: 2023-11-20

## 2023-11-20 VITALS
HEIGHT: 62 IN | HEART RATE: 88 BPM | DIASTOLIC BLOOD PRESSURE: 80 MMHG | BODY MASS INDEX: 26.68 KG/M2 | RESPIRATION RATE: 16 BRPM | SYSTOLIC BLOOD PRESSURE: 118 MMHG | WEIGHT: 145 LBS

## 2023-11-20 DIAGNOSIS — I48.0 PAROXYSMAL ATRIAL FIBRILLATION (HCC): Primary | ICD-10-CM

## 2023-11-20 DIAGNOSIS — Z79.899 LONG TERM CURRENT USE OF AMIODARONE: Primary | ICD-10-CM

## 2023-11-20 PROCEDURE — 1090F PRES/ABSN URINE INCON ASSESS: CPT | Performed by: INTERNAL MEDICINE

## 2023-11-20 PROCEDURE — 3074F SYST BP LT 130 MM HG: CPT | Performed by: INTERNAL MEDICINE

## 2023-11-20 PROCEDURE — G8484 FLU IMMUNIZE NO ADMIN: HCPCS | Performed by: INTERNAL MEDICINE

## 2023-11-20 PROCEDURE — 3079F DIAST BP 80-89 MM HG: CPT | Performed by: INTERNAL MEDICINE

## 2023-11-20 PROCEDURE — 1123F ACP DISCUSS/DSCN MKR DOCD: CPT | Performed by: INTERNAL MEDICINE

## 2023-11-20 PROCEDURE — G8427 DOCREV CUR MEDS BY ELIG CLIN: HCPCS | Performed by: INTERNAL MEDICINE

## 2023-11-20 PROCEDURE — 93000 ELECTROCARDIOGRAM COMPLETE: CPT | Performed by: INTERNAL MEDICINE

## 2023-11-20 PROCEDURE — 1036F TOBACCO NON-USER: CPT | Performed by: INTERNAL MEDICINE

## 2023-11-20 PROCEDURE — 3017F COLORECTAL CA SCREEN DOC REV: CPT | Performed by: INTERNAL MEDICINE

## 2023-11-20 PROCEDURE — G8399 PT W/DXA RESULTS DOCUMENT: HCPCS | Performed by: INTERNAL MEDICINE

## 2023-11-20 PROCEDURE — G8417 CALC BMI ABV UP PARAM F/U: HCPCS | Performed by: INTERNAL MEDICINE

## 2023-11-20 PROCEDURE — 99214 OFFICE O/P EST MOD 30 MIN: CPT | Performed by: INTERNAL MEDICINE

## 2023-11-20 PROCEDURE — 1111F DSCHRG MED/CURRENT MED MERGE: CPT | Performed by: INTERNAL MEDICINE

## 2023-11-20 NOTE — PROGRESS NOTES
hemodynamically significant aortic stenosis is present. Moderate tricuspid regurgitation. RVSP is 119 mmHg. Pulmonary hypertension is severe . No evidence for hemodynamically significant pericardial effusion. TTE- 8/28/23:  EF 60%,Left ventricular internal dimensions were normal in diastole and systole. Moderate left ventricular concentric hypertrophy noted. No regional wall motion abnormalities seen. Normal left ventricular ejection fraction. Moderately dilated right ventricle. Right ventricle global systolic function is reduced. Moderately enlarged right atrium size. Mild tricuspid regurgitation. Pulmonary hypertension is moderate to severe . There is a small circumferential pericardial effusion noted. Moderate left pleural effusion.      Stress test:          Cardiac catheterization:     The 10-year ASCVD risk score (Emma PEARSON, et al., 2019) is: 17.8%    Values used to calculate the score:      Age: 78 years      Sex: Female      Is Non- : Yes      Diabetic: Yes      Tobacco smoker: No      Systolic Blood Pressure: 381 mmHg      Is BP treated: Yes      HDL Cholesterol: 82 mg/dL      Total Cholesterol: 209 mg/dL        ASSESSMENT:  Non cardiac chest pain, no further cardiac work up is planned at this   Hypotension, improved  Permanent atrial fibrillation with RVR diagnosed in 2016>> currently in sinus rhythm  Severe pulmonary hypertension, RVSP 119 mmHg with mild RV systolic dysfunction  CHADS2 Vascor 4, on Eliquis for anticoagulation  Chronic HFpEF, volume status seems reasonably euvolemic  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

## 2023-11-20 NOTE — PATIENT INSTRUCTIONS
Echo results reviewed, EF 60%, RV function decreased, mod to severe pulmonary hypertension. Continue amiodarone 200 mg po daily for rate and rhythm control  Continue amlodipine 5 mg po daily for high BP  Continue Toprol 25  mg po twice a day and Eliquis 5 mg Po twice a day for anticoagulation  She was advised to keep well-hydrated and recommended to drink at least 2 glasses of water after going home. Monitor blood pressure and call me with the blood pressure readings in a.m. Patient was advised to consider watchman device for stroke prevention which she is going to think about it. Continue Lasix 20  mg daily  Schedule for PFTS to monitor amiodarone toxicity. She was advised to see eye doctor every 6 months. Follow with renal service for CKD  Continue rest of the current medications  Follow-up at [facility] pulmonary hypertension clinic  TSH and free T4 and advised to follow up with her PCP Dr. Aleksandr Nguyen for monitoring thyorid levels. Follow-up with me in 3 months.

## 2023-11-20 NOTE — TELEPHONE ENCOUNTER
Per Dr. Betina Rascon, patient needs PFTs re: long term Amiodarone use. Prior auth pending.     PFTs ([id number])  Long Term Amiodarone Use (Z79.899)

## 2023-11-20 NOTE — TELEPHONE ENCOUNTER
Care Transitions Initial Follow Up Call    Outreach made within 2 business days of discharge: Yes    Patient: Trinidad Izquierdo Patient : 1955   MRN: [id number]  Reason for Admission: There are no discharge diagnoses documented for the most recent discharge.   Discharge Date: 23       Spoke with: message left for patient    Discharge department/facility: [facility] left requesting return call and reminding of appt on 23    Scheduled appointment with PCP within 7-14 days    Follow Up  Future Appointments   Date Time Provider [address]   2023  2:20 PM Ayala Mota MD 1495 [facility]   2023 12:40 PM [facility]   2023 12:15 PM Gloria Coffman  [address], RN

## 2023-11-21 ENCOUNTER — OFFICE VISIT (OUTPATIENT)
Dept: FAMILY MEDICINE CLINIC | Age: 68
End: 2023-11-21

## 2023-11-21 VITALS
TEMPERATURE: 98.1 F | OXYGEN SATURATION: 98 % | DIASTOLIC BLOOD PRESSURE: 79 MMHG | HEART RATE: 69 BPM | SYSTOLIC BLOOD PRESSURE: 117 MMHG | RESPIRATION RATE: 20 BRPM

## 2023-11-21 DIAGNOSIS — Z09 HOSPITAL DISCHARGE FOLLOW-UP: Primary | ICD-10-CM

## 2023-11-21 DIAGNOSIS — R07.89 CHEST WALL PAIN: ICD-10-CM

## 2023-11-21 DIAGNOSIS — Z23 NEED FOR INFLUENZA VACCINATION: ICD-10-CM

## 2023-11-21 DIAGNOSIS — E87.6 POTASSIUM (K) DEFICIENCY: ICD-10-CM

## 2023-11-21 DIAGNOSIS — Z76.0 MEDICATION REFILL: ICD-10-CM

## 2023-11-21 RX ORDER — MULTIVITAMIN
1 TABLET ORAL DAILY
Qty: 30 TABLET | Refills: 3 | Status: SHIPPED | OUTPATIENT
Start: 2023-11-21 | End: 2024-05-19

## 2023-11-21 RX ORDER — OMEGA-3-ACID ETHYL ESTERS 1 G/1
2 CAPSULE, LIQUID FILLED ORAL 2 TIMES DAILY
Qty: 60 CAPSULE | Refills: 3 | Status: SHIPPED | OUTPATIENT
Start: 2023-11-21

## 2023-11-21 RX ORDER — LIDOCAINE 50 MG/G
1 PATCH TOPICAL DAILY
Qty: 30 PATCH | Refills: 3 | Status: SHIPPED | OUTPATIENT
Start: 2023-11-21 | End: 2024-03-20

## 2023-11-21 NOTE — TELEPHONE ENCOUNTER
Patient's  Adelaide Shelley) notified. Patient's  given [employer] number to call to schedule around patient's availability.

## 2023-11-21 NOTE — PROGRESS NOTES
Vaccine Information Sheet, \"Influenza - Inactivated\"  given to Cathyann Lanes, or parent/legal guardian of  Cathyann Lanes and verbalized understanding. Patient responses:    Have you ever had a reaction to a flu vaccine? No  Do you have any current illness? No  Have you ever had [place] Syndrome? No  Do you have a serious allergy to any of the follow: Neomycin, Polymyxin, Thimerosal, eggs or egg products? No    Flu vaccine given per order. Please see immunization tab. Risks and benefits explained. Current VIS given.        V

## 2023-11-21 NOTE — PROGRESS NOTES
Post-Discharge Transitional Care  Follow Up      Royal Silvestre   YOB: 1955    Date of Office Visit:  11/21/2023  Date of Hospital Admission: 11/10/23  Date of Hospital Discharge: 11/16/23  Risk of hospital readmission (high >=14%. Medium >=10%) :Readmission Risk Score: 40.2      Care management risk score Rising risk (score 2-5) and Complex Care (Scores >=6): No Risk Score On File     Non face to face  following discharge, date last encounter closed (first attempt may have been earlier): 11/20/2023    Call initiated 2 business days of discharge: Yes    ASSESSMENT/PLAN:   Hospital discharge follow-up  -     [place] DISCHARGE MEDS RECONCILED W/ CURRENT OUTPATIENT MED LIST  Chest wall pain  -     lidocaine (LIDODERM) 5 %; Place 1 patch onto the skin daily 12 hours on, 12 hours off., Disp-30 patch, R-3Normal  Potassium (K) deficiency  -     Multiple Vitamin (MULTI-DAY VITAMINS) TABS; Take 1 tablet by mouth daily, Disp-30 tablet, R-3Normal  Medication refill  -     omega-3 acid ethyl esters (LOVAZA) 1 g capsule; Take 2 capsules by mouth 2 times daily, Disp-60 capsule, R-3Normal  -     Multiple Vitamin (MULTI-DAY VITAMINS) TABS; Take 1 tablet by mouth daily, Disp-30 tablet, R-3Normal  Need for influenza vaccination  -     Influenza, FLUAD, (age 72 y+), IM, Preservative Free, 0.5 mL    Recheck TSH/FT4 in 1 month, consider adjusting levothyroxine. Discussed I will not be prescribing any treatment for her chest pain. Discussed conservative management since lidocaine patches/IcyHot. Discontinued cough syrup with codeine. Follow up with endocrinology         Medical Decision Making: high complexity  Return in 1 month (on 12/21/2023). Subjective:   HPI:  Follow up of Hospital problems/diagnosis(es): generalized weakness, altagracia, secondary adrenal insufficiency and afib     Inpatient course: Discharge summary reviewed- see chart.     Interval history/Current status:   Was admitted to 11/10 - 11/16

## 2023-11-21 NOTE — TELEPHONE ENCOUNTER
Per [facility], no auth required for PFTs ([id number]). See notification scanned into Epic.

## 2023-11-27 DIAGNOSIS — I82.90 ACUTE DEEP VEIN THROMBOSIS (DVT) OF NON-EXTREMITY VEIN: ICD-10-CM

## 2023-11-27 DIAGNOSIS — I50.33 ACUTE ON CHRONIC DIASTOLIC CHF (CONGESTIVE HEART FAILURE) (HCC): ICD-10-CM

## 2023-11-27 RX ORDER — FUROSEMIDE 20 MG/1
40 TABLET ORAL DAILY
Qty: 60 TABLET | Refills: 0 | OUTPATIENT
Start: 2023-11-27 | End: 2023-12-27

## 2023-11-27 RX ORDER — APIXABAN 5 MG/1
5 TABLET, FILM COATED ORAL 2 TIMES DAILY
Qty: 60 TABLET | Refills: 0 | Status: SHIPPED | OUTPATIENT
Start: 2023-11-27 | End: 2023-12-27

## 2023-11-27 NOTE — TELEPHONE ENCOUNTER
Last Appointment:  10/23/2023  Future Appointments   Date Time Provider [address]   11/29/2023 12:15 PM Ant Fagan MD [facility]   3/12/2024  2:40 PM Alexey Townsend MD 1411 [facility]

## 2023-12-04 NOTE — TELEPHONE ENCOUNTER
Last Appointment:  10/23/2023  Future Appointments   Date Time Provider 4600  [facility]   1/8/2024 12:30 PM Abdi Elmore MD [facility]   3/12/2024  2:40 PM Huy Pham MD 9365 [facility]

## 2023-12-05 RX ORDER — GUAIFENESIN 400 MG/1
400 TABLET ORAL PRN
Qty: 30 TABLET | Refills: 0 | Status: SHIPPED | OUTPATIENT
Start: 2023-12-05

## 2023-12-05 RX ORDER — FUROSEMIDE 20 MG/1
20 TABLET ORAL DAILY
Qty: 30 TABLET | Refills: 5 | Status: SHIPPED | OUTPATIENT
Start: 2023-12-05 | End: 2024-06-02

## 2023-12-05 RX ORDER — LEVOTHYROXINE SODIUM 0.05 MG/1
50 TABLET ORAL DAILY
Qty: 30 TABLET | Refills: 3 | Status: SHIPPED | OUTPATIENT
Start: 2023-12-05

## 2023-12-09 NOTE — PLAN OF CARE
[facility]    PCP: Dominick Coppola MD     Chief Complaint:   Chief Complaint   Patient presents with    Urinary Complaint    Hematuria        History of Present Illness: Glenn Altamirano is a 64 year old male  with a past medical history significant for hypertension, hyperlipidemia, myotonic muscular dystrophy, pulmonary embolism, dysphagia, obesity who presented to the emergency room for evaluation of hematuria and urinary frequency patient states yesterday around 3 PM he noticed that he had difficulty with urination with burning and noticed hematuria.  Presented to the emergency room, workup initiated revealed low blood pressures, CMP showed creatinine of 0.47, bilirubin of 1.2, CBC was unremarkable.  Urinalysis concerning for hematuria and cystitis.  Patient was admitted to the medical floor with urology on consult for further evaluation.    Patient seen examined the room now.  He is currently resting in bed, feeling better than yesterday.  He states that his urine color has changed to brown, but unable to see as there is no urine in the canister.  He states during the last admission he had Belle catheter placement for retention but had some trauma from it.  He currently denies any chest pain, shortness of breath, nausea, vomiting or diarrhea.  He is currently on tube feedings, lives at home with his spouse and has caregivers.  He denies any alcohol or smoking.  Patient would like to go home today.    Past Medical History:    Past Medical History:   Diagnosis Date    Anxiety     Blood clot associated with vein wall inflammation     Chronic pain     Depression     Essential (primary) hypertension     High cholesterol     Myotonic muscular dystrophy (CMD)         Surgical History:   Past Surgical History:   Procedure Laterality Date    Eye surgery      Removal gallbladder          Family History: No family history on file.     Social History: [unfilled] [unfilled]   Social History  Problem: Chronic Conditions and Co-morbidities  Goal: Patient's chronic conditions and co-morbidity symptoms are monitored and maintained or improved  Outcome: Progressing     Problem: Discharge Planning  Goal: Discharge to home or other facility with appropriate resources  Outcome: Progressing     Problem: Pain  Goal: Verbalizes/displays adequate comfort level or baseline comfort level  Outcome: Progressing     Problem: Safety - Adult  Goal: Free from fall injury  Outcome: Progressing     Problem: Skin/Tissue Integrity  Goal: Absence of new skin breakdown  Description: 1. Monitor for areas of redness and/or skin breakdown  2. Assess vascular access sites hourly  3. Every 4-6 hours minimum:  Change oxygen saturation probe site  4. Every 4-6 hours:  If on nasal continuous positive airway pressure, respiratory therapy assess nares and determine need for appliance change or resting period.   Outcome: Progressing     Problem: ABCDS Injury Assessment  Goal: Absence of physical injury  Outcome: Progressing     Problem: Nutrition Deficit:  Goal: Optimize nutritional status  Outcome: Progressing  Flowsheets (Taken 7/13/2023 1020 by Rajinder Brown RD)  Nutrient intake appropriate for improving, restoring, or maintaining nutritional needs:   Monitor oral intake, labs, and treatment plans   Recommend appropriate diets, oral nutritional supplements, and vitamin/mineral supplements     Problem: Respiratory - Adult  Goal: Achieves optimal ventilation and oxygenation  Outcome: Progressing     Problem: Neurosensory - Adult  Goal: Absence of seizures  Outcome: Progressing     Problem: Neurosensory - Adult  Goal: Achieves maximal functionality and self care  Outcome: Progressing     Problem: Skin/Tissue Integrity - Adult  Goal: Skin integrity remains intact  Recent Flowsheet Documentation  Taken 7/13/2023 1121 by Ford Meyer RN  Skin Integrity Remains Intact: Monitor for areas of redness and/or skin breakdown     Substance and Sexual Activity   Alcohol Use Never    [unfilled]   Social History     Tobacco Use   Smoking Status Former    Types: Cigarettes    Start date:     Quit date:     Years since quittin.9   Smokeless Tobacco Never        Allergies:   ALLERGIES:   Allergen Reactions    Fluorescein Other (See Comments)    Gentamicin Other (See Comments)    Metoclopramide Hcl PRURITUS    Ondansetron Other (See Comments)    Polymyxin B-Trimethoprim Other (See Comments)        Medications: See EMR. Medication Reconciliation Form Reviewed    Review of Systems:  General: Denies fever, chills, or malaise.  Skin: Denies pruritus or rashes.  Head and Neck: Denies neck pain or stiffness.  No headaches.  EENT: Denies visual changes, diplopia or blurred vision. Denies current congestion, runny nose or sneezing.  Chest and Lungs: Denies difficulty breathing or shortness of breath with or without exertion.  No sputum changes.  Cardiovascular: Denies chest pain or palpitations.  No edema or claudication.  Gastrointestinal: Denies abdominal pain, heartburn or diarrhea.  No nausea, vomiting or constipation.  No hematemesis or blood in stools.  Genitourinary: Denies flank pain or dysuria.  No hematuria.  Musculoskeletal: Denies joint pain, redness or swelling.  Denies difficulty with range of motion.  Neurologic: Denies dizziness or syncope.      Physical Examination:   Mental Status: Alert and oriented.  Follows commands appropriately.    VITAL SIGNS:    Vital Last Value 24 Hour Range   Temperature 99.1 °F (37.3 °C) (23) Temp  Min: 97.6 °F (36.4 °C)  Max: 99.3 °F (37.4 °C)   Pulse 81 (23 1125) Pulse  Min: 80  Max: 104   Respiratory 16 (23 112) Resp  Min: 15  Max: 22   Non-Invasive  Blood Pressure 104/68 (23 1124) BP  Min: 91/54  Max: 136/89   Pulse Oximetry 96 % (23) SpO2  Min: 91 %  Max: 98 %       ENT: PERRL. EOM intact  Neck: Supple, No JVD  Respiratory: Reduced air entry  bilaterally, on room air  CV: S1, S2. RRR.  GI: Abdomen soft, nontender.  Active bowel sounds in all four quadrants.  G-tube in place  : No CVA tenderness, hematuria  Extremities: No cyanosis, chronic edema  Skin: Pink, warm and dry.  CNS: Normal speech, no focal deficits, cranial nerves grossly intact  Psych: Appropriate mood and affect    Labs:   Lab Results   Component Value Date    GLUCOSE 110 (H) 12/09/2023    BUN 13 12/09/2023    CREATININE 0.47 (L) 12/09/2023    CALCIUM 8.9 12/09/2023    GLOB 4.2 (H) 12/08/2023    AGR 0.7 (L) 12/08/2023    AST 53 (H) 12/08/2023    CO2 27 12/09/2023    ANIONGAP 11 12/09/2023     Lab Results   Component Value Date    WBC 10.5 12/09/2023    RBC 3.73 (L) 12/09/2023    HGB 12.3 (L) 12/09/2023    HCT 37.6 (L) 12/09/2023    .8 (H) 12/09/2023    MCH 33.0 12/09/2023    MCHC 32.7 12/09/2023     12/09/2023          Assessment / Plan    Gross hematuria  Kidney ultrasound pending  Urology on consult, will evaluation  Anticoagulation currently on hold  During last hospitalization, patient was noted to have urinary retention and had antibiotic catheterization    Acute cystitis with hematuria  Urinalysis concerning for cystitis  Await urine culture sensitivities  Continue empiric IV antibiotics    Hyperbilirubinemia  Will trend    Hypotension  Blood pressure slowly getting better, home medication currently on hold    Dysphagia, gastrostomy status  Dietitian consult requested to resume patient's tube feeding  Patient has speech therapy at home, has known to have high risk of aspiration  Okay for ice chips    History of pulmonary embolism  Anticoagulated currently on hold due to hematuria    Concern for BPH  Will await urology evaluation, patient will likely benefit from alpha blockers    Muscular myotonic dystrophy  Patient is mostly bedbound, states he was recently able to stand up last week  Patient is on therapy    Obesity  Lifestyle modification and weight reduction  recommended    DVT Prophylaxis: SCDs    Code Status: Full    Disposition; continue empiric IV antibiotics, will await culture and urology evaluation.  Further recommendations on glucose progresses.    Pt. seen and evaluated and management undertaken in conjunction with Dr. Moreno in person.    Case discussed with patient and RN.    ESTHER Rose     This note used Dragon technology. Transcription errors are not uncommon and may not have been corrected prior to electronically signing the note. Should you find these errors, please consult the clinician for interpretation.

## 2023-12-15 ENCOUNTER — HOSPITAL ENCOUNTER (OUTPATIENT)
Dept: PULMONOLOGY | Age: 68
Discharge: HOME | End: 2023-12-15
Attending: INTERNAL MEDICINE
Payer: MEDICARE

## 2023-12-15 DIAGNOSIS — Z79.899 LONG TERM CURRENT USE OF AMIODARONE: ICD-10-CM

## 2023-12-15 PROCEDURE — 94060 EVALUATION OF WHEEZING: CPT

## 2023-12-15 PROCEDURE — 94729 DIFFUSING CAPACITY: CPT

## 2023-12-20 NOTE — PROGRESS NOTES
XRR 7590   education  Pt educated on PT role in acute care. Patient response to education:   Pt verbalized understanding Pt demonstrated skill Pt requires further education in this area   Yes NA Reinforce     ASSESSMENT:    Conditions Requiring Skilled Therapeutic Intervention:    [x]Decreased strength     []Decreased ROM  [x]Decreased functional mobility  [x]Decreased balance   [x]Decreased endurance   []Decreased posture  []Decreased sensation  []Decreased coordination   []Decreased vision  []Decreased safety awareness   [x]Increased pain     Comments:    Upon entry to room, pt was in bed and agreeable to PT evaluation after some encouragement. Pt resting O2 sat was 94% on 4L O2 via NC. Pt was able to sit EOB with light assistance. Pt O2 sat remained stable at EOB at 93%. Pt stood EOB with medium assist. Pt completed two side steps EOB with medium assist and help moving [address]. Pt struggled to advance limbs, was visibly tired, and could not advance [address]. Pt sat back EOB and was visibly SOB however, pt O2 sat still 93%. Pt completed another STS to remove brief. Pt was positioned back in bed with heavy assist. Pt was repositioned comfortably in bed with all needs met and call light nearby. Treatment:  Patient practiced and was instructed in the following treatment:    Bed Mobility: Verbal cues required for hand placement, and technique. Physical assistance required for sit<>supine. Transfers: x2 EOB. Verbal cues given for hand placement and safety. Physical assistance required to stand. Ambulation: Sidesteps at EOB. Verbal cues and physical assistance required to advance [address]. Verbal cues required for technique and safety. Monitoring of Vitals: Pt O2 sat monitored closely throughout session. Pt's/family goals:  1. To return home. Prognosis is Good for reaching above PT goals. Patient and or family understand(s) diagnosis, prognosis, and plan of care. Yes.     PHYSICAL THERAPY PLAN OF CARE:    PT POC is established based on physician order and patient diagnosis     Referring provider/PT Order:    [id number]  PT eval and treat  Start:  07/14/22 2245,   End:  07/14/22 2245,   ONE TIME,   Standing Count:  1 Occurrences,   Kaylan Johnson MD     Diagnosis:  Bilateral pleural effusion [J90]  Acute combined systolic and diastolic CHF, NYHA class 1 ([facility] [address] [I50.41]  Atrial fibrillation, unspecified type ([facility] [address] [I48.91]  Acute on chronic congestive heart failure, unspecified heart failure type ([address] [I50.9]  Specific instructions for next treatment: To increase activity. Current Treatment Recommendations:     [x] Strengthening to improve independence with functional mobility   [x] ROM to improve independence with functional mobility   [x] Balance Training to improve static/dynamic balance and to reduce fall risk  [x] Endurance Training to improve activity tolerance during functional mobility   [x] Transfer Training to improve safety and independence with all functional transfers   [x] Gait Training to improve gait mechanics, endurance and assess need for appropriate assistive device  [] Stair Training in preparation for safe discharge home and/or into the community   [] Positioning to prevent skin breakdown and contractures  [x] Safety and Education Training   [x] Patient/Caregiver Education   [] HEP  [] Other     PT long term treatment goals are located in above grid    Frequency of treatments: 2-5x/week x 2-3 days. Time in  0930  Time out  1003    Total Treatment Time  15 minutes     Evaluation Time includes thorough review of current medical information, gathering information on past medical history/social history and prior level of function, completion of standardized testing/informal observation of tasks, assessment of data and education on plan of care and goals.     CPT codes:  [x] Low Complexity PT evaluation 09611  [] Moderate Complexity PT evaluation 24311  [] High Complexity PT evaluation 25309  [] PT Re-evaluation 26369  [] Gait training 99889 0 minutes  [] Manual therapy 18663 0 minutes  [x] Therapeutic activities 37732 15 minutes  [] Therapeutic exercises 45626 0 minutes  [] Neuromuscular reeducation 00663 0 minutes     Jerrell Feliciano PT, DPT  [id number]      Maggy Mendoza, SPT

## 2023-12-27 ENCOUNTER — TELEPHONE (OUTPATIENT)
Dept: FAMILY MEDICINE CLINIC | Age: 68
End: 2023-12-27

## 2023-12-27 DIAGNOSIS — G89.4 CHRONIC PAIN SYNDROME: ICD-10-CM

## 2023-12-27 DIAGNOSIS — I82.90 ACUTE DEEP VEIN THROMBOSIS (DVT) OF NON-EXTREMITY VEIN: ICD-10-CM

## 2023-12-28 RX ORDER — APIXABAN 5 MG/1
5 TABLET, FILM COATED ORAL 2 TIMES DAILY
Qty: 60 TABLET | Refills: 3 | Status: ON HOLD | OUTPATIENT
Start: 2023-12-28 | End: 2024-04-26

## 2023-12-28 RX ORDER — DULOXETIN HYDROCHLORIDE 60 MG/1
60 CAPSULE, DELAYED RELEASE ORAL DAILY
Qty: 30 CAPSULE | Refills: 3 | Status: ON HOLD | OUTPATIENT
Start: 2023-12-28

## 2023-12-28 NOTE — TELEPHONE ENCOUNTER
Message left on patient's voice mail advising of refills  requested return call. If they have any questions or concerns .

## 2023-12-30 ENCOUNTER — APPOINTMENT (OUTPATIENT)
Dept: GENERAL RADIOLOGY | Age: 68
DRG: 190 | End: 2023-12-30
Payer: MEDICARE

## 2023-12-30 ENCOUNTER — APPOINTMENT (OUTPATIENT)
Dept: CT IMAGING | Age: 68
DRG: 190 | End: 2023-12-30
Payer: MEDICARE

## 2023-12-30 ENCOUNTER — HOSPITAL ENCOUNTER (INPATIENT)
Age: 68
LOS: 5 days | Discharge: HOME HEALTH | DRG: 190 | End: 2024-01-04
Attending: STUDENT IN AN ORGANIZED HEALTH CARE EDUCATION/TRAINING PROGRAM | Admitting: FAMILY MEDICINE
Payer: MEDICARE

## 2023-12-30 DIAGNOSIS — J44.1 COPD EXACERBATION (HCC): Primary | ICD-10-CM

## 2023-12-30 DIAGNOSIS — R06.89 DYSPNEA AND RESPIRATORY ABNORMALITIES: ICD-10-CM

## 2023-12-30 DIAGNOSIS — R06.00 DYSPNEA AND RESPIRATORY ABNORMALITIES: ICD-10-CM

## 2023-12-30 LAB
ALBUMIN SERPL-MCNC: 3.4 G/DL (ref 3.5–5.2)
ALP SERPL-CCNC: 106 U/L (ref 35–104)
ALT SERPL-CCNC: 16 U/L (ref 0–32)
ANION GAP SERPL CALCULATED.3IONS-SCNC: 12 MMOL/L (ref 7–16)
AST SERPL-CCNC: 34 U/L (ref 0–31)
B PARAP IS1001 DNA NPH QL NAA+NON-PROBE: NOT DETECTED
B PERT DNA SPEC QL NAA+PROBE: NOT DETECTED
BACTERIA URNS QL MICRO: ABNORMAL
BASOPHILS # BLD: 0.03 K/UL (ref 0–0.2)
BASOPHILS NFR BLD: 1 % (ref 0–2)
BILIRUB SERPL-MCNC: 0.4 MG/DL (ref 0–1.2)
BILIRUB UR QL STRIP: NEGATIVE
BNP SERPL-MCNC: 3248 PG/ML (ref 0–125)
BUN SERPL-MCNC: 16 MG/DL (ref 6–23)
C PNEUM DNA NPH QL NAA+NON-PROBE: NOT DETECTED
CALCIUM SERPL-MCNC: 9.4 MG/DL (ref 8.6–10.2)
CHLORIDE SERPL-SCNC: 102 MMOL/L (ref 98–107)
CLARITY UR: CLEAR
CO2 SERPL-SCNC: 30 MMOL/L (ref 22–29)
COLOR UR: YELLOW
CREAT SERPL-MCNC: 0.9 MG/DL (ref 0.5–1)
CRITICAL ACTION: NORMAL
CRITICAL NOTIFICATION DATE/TIME: NORMAL
CRITICAL NOTIFICATION: NORMAL
CRITICAL VALUE READ BACK: YES
EOSINOPHIL # BLD: 0.25 K/UL (ref 0.05–0.5)
EOSINOPHILS RELATIVE PERCENT: 7 % (ref 0–6)
EPI CELLS #/AREA URNS HPF: ABNORMAL /HPF
ERYTHROCYTE [DISTWIDTH] IN BLOOD BY AUTOMATED COUNT: 14.6 % (ref 11.5–15)
FLOW RATE: 4
FLUAV RNA NPH QL NAA+NON-PROBE: NOT DETECTED
FLUBV RNA NPH QL NAA+NON-PROBE: NOT DETECTED
GFR SERPL CREATININE-BSD FRML MDRD: >60 ML/MIN/1.73M2
GLUCOSE SERPL-MCNC: 78 MG/DL (ref 74–99)
GLUCOSE UR STRIP-MCNC: NEGATIVE MG/DL
HADV DNA NPH QL NAA+NON-PROBE: NOT DETECTED
HCOV 229E RNA NPH QL NAA+NON-PROBE: NOT DETECTED
HCOV HKU1 RNA NPH QL NAA+NON-PROBE: NOT DETECTED
HCOV NL63 RNA NPH QL NAA+NON-PROBE: NOT DETECTED
HCOV OC43 RNA NPH QL NAA+NON-PROBE: NOT DETECTED
HCT VFR BLD AUTO: 34 % (ref 34–48)
HCT VFR BLD AUTO: 36 % (ref 37–54)
HGB BLD-MCNC: 10.2 G/DL (ref 11.5–15.5)
HGB UR QL STRIP.AUTO: NEGATIVE
HMPV RNA NPH QL NAA+NON-PROBE: NOT DETECTED
HPIV1 RNA NPH QL NAA+NON-PROBE: NOT DETECTED
HPIV2 RNA NPH QL NAA+NON-PROBE: NOT DETECTED
HPIV3 RNA NPH QL NAA+NON-PROBE: NOT DETECTED
HPIV4 RNA NPH QL NAA+NON-PROBE: NOT DETECTED
IMM GRANULOCYTES # BLD AUTO: <0.03 K/UL (ref 0–0.58)
IMM GRANULOCYTES NFR BLD: 0 % (ref 0–5)
KETONES UR STRIP-MCNC: NEGATIVE MG/DL
LEUKOCYTE ESTERASE UR QL STRIP: NEGATIVE
LYMPHOCYTES NFR BLD: 0.63 K/UL (ref 1.5–4)
LYMPHOCYTES RELATIVE PERCENT: 17 % (ref 20–42)
M PNEUMO DNA NPH QL NAA+NON-PROBE: NOT DETECTED
MCH RBC QN AUTO: 25.3 PG (ref 26–35)
MCHC RBC AUTO-ENTMCNC: 30 G/DL (ref 32–34.5)
MCV RBC AUTO: 84.4 FL (ref 80–99.9)
MONOCYTES NFR BLD: 0.52 K/UL (ref 0.1–0.95)
MONOCYTES NFR BLD: 14 % (ref 2–12)
NEUTROPHILS NFR BLD: 61 % (ref 43–80)
NEUTS SEG NFR BLD: 2.22 K/UL (ref 1.8–7.3)
NITRITE UR QL STRIP: NEGATIVE
O2 DELIVERY DEVICE: ABNORMAL
PH UR STRIP: 6 [PH] (ref 5–9)
PLATELET # BLD AUTO: 181 K/UL (ref 130–450)
PMV BLD AUTO: 11.8 FL (ref 7–12)
POC HCO3: 32.4 MMOL/L (ref 22–26)
POC HEMOGLOBIN (CALC): 12.1 G/DL (ref 12.5–15.5)
POC O2 SATURATION: 32.8 % (ref 92–98.5)
POC PCO2: 56.4 MM HG (ref 35–45)
POC PH: 7.37 (ref 7.35–7.45)
POC PO2: 21.5 MM HG (ref 60–80)
POSITIVE BASE EXCESS, ART: 5.7 MMOL/L (ref 0–3)
POTASSIUM SERPL-SCNC: 3.8 MMOL/L (ref 3.5–5)
PROT SERPL-MCNC: 6.8 G/DL (ref 6.4–8.3)
PROT UR STRIP-MCNC: NEGATIVE MG/DL
RBC # BLD AUTO: 4.03 M/UL (ref 3.5–5.5)
RBC #/AREA URNS HPF: ABNORMAL /HPF
RSV RNA NPH QL NAA+NON-PROBE: NOT DETECTED
RV+EV RNA NPH QL NAA+NON-PROBE: NOT DETECTED
SARS-COV-2 RNA NPH QL NAA+NON-PROBE: NOT DETECTED
SODIUM SERPL-SCNC: 144 MMOL/L (ref 132–146)
SP GR UR STRIP: 1.01 (ref 1–1.03)
SPECIMEN DESCRIPTION: NORMAL
TROPONIN I SERPL HS-MCNC: 13 NG/L (ref 0–9)
TROPONIN I SERPL HS-MCNC: 13 NG/L (ref 0–9)
UROBILINOGEN UR STRIP-ACNC: 0.2 EU/DL (ref 0–1)
WBC #/AREA URNS HPF: ABNORMAL /HPF
WBC OTHER # BLD: 3.7 K/UL (ref 4.5–11.5)

## 2023-12-30 PROCEDURE — 85025 COMPLETE CBC W/AUTO DIFF WBC: CPT

## 2023-12-30 PROCEDURE — 94664 DEMO&/EVAL PT USE INHALER: CPT

## 2023-12-30 PROCEDURE — 84145 PROCALCITONIN (PCT): CPT

## 2023-12-30 PROCEDURE — 96374 THER/PROPH/DIAG INJ IV PUSH: CPT

## 2023-12-30 PROCEDURE — 71260 CT THORAX DX C+: CPT

## 2023-12-30 PROCEDURE — 87899 AGENT NOS ASSAY W/OPTIC: CPT

## 2023-12-30 PROCEDURE — 87449 NOS EACH ORGANISM AG IA: CPT

## 2023-12-30 PROCEDURE — 82803 BLOOD GASES ANY COMBINATION: CPT

## 2023-12-30 PROCEDURE — 6360000004 HC RX CONTRAST MEDICATION: Performed by: RADIOLOGY

## 2023-12-30 PROCEDURE — 6360000002 HC RX W HCPCS

## 2023-12-30 PROCEDURE — 2580000003 HC RX 258

## 2023-12-30 PROCEDURE — 2060000000 HC ICU INTERMEDIATE R&B

## 2023-12-30 PROCEDURE — 0202U NFCT DS 22 TRGT SARS-COV-2: CPT

## 2023-12-30 PROCEDURE — 81001 URINALYSIS AUTO W/SCOPE: CPT

## 2023-12-30 PROCEDURE — 6370000000 HC RX 637 (ALT 250 FOR IP)

## 2023-12-30 PROCEDURE — 83880 ASSAY OF NATRIURETIC PEPTIDE: CPT

## 2023-12-30 PROCEDURE — 80053 COMPREHEN METABOLIC PANEL: CPT

## 2023-12-30 PROCEDURE — 85014 HEMATOCRIT: CPT

## 2023-12-30 PROCEDURE — 99285 EMERGENCY DEPT VISIT HI MDM: CPT

## 2023-12-30 PROCEDURE — 84484 ASSAY OF TROPONIN QUANT: CPT

## 2023-12-30 PROCEDURE — 94640 AIRWAY INHALATION TREATMENT: CPT

## 2023-12-30 PROCEDURE — 93005 ELECTROCARDIOGRAM TRACING: CPT | Performed by: STUDENT IN AN ORGANIZED HEALTH CARE EDUCATION/TRAINING PROGRAM

## 2023-12-30 PROCEDURE — 6370000000 HC RX 637 (ALT 250 FOR IP): Performed by: STUDENT IN AN ORGANIZED HEALTH CARE EDUCATION/TRAINING PROGRAM

## 2023-12-30 PROCEDURE — 71045 X-RAY EXAM CHEST 1 VIEW: CPT

## 2023-12-30 PROCEDURE — 2700000000 HC OXYGEN THERAPY PER DAY

## 2023-12-30 PROCEDURE — 5A09357 ASSISTANCE WITH RESPIRATORY VENTILATION, LESS THAN 24 CONSECUTIVE HOURS, CONTINUOUS POSITIVE AIRWAY PRESSURE: ICD-10-PCS | Performed by: FAMILY MEDICINE

## 2023-12-30 RX ORDER — LEVETIRACETAM 500 MG/1
500 TABLET ORAL 2 TIMES DAILY
Status: DISCONTINUED | OUTPATIENT
Start: 2023-12-30 | End: 2024-01-04 | Stop reason: HOSPADM

## 2023-12-30 RX ORDER — FERROUS SULFATE 325(65) MG
325 TABLET ORAL
Status: DISCONTINUED | OUTPATIENT
Start: 2023-12-31 | End: 2024-01-04 | Stop reason: HOSPADM

## 2023-12-30 RX ORDER — PREDNISONE 20 MG/1
40 TABLET ORAL
Status: DISCONTINUED | OUTPATIENT
Start: 2023-12-31 | End: 2023-12-30

## 2023-12-30 RX ORDER — VITAMIN B COMPLEX
1000 TABLET ORAL DAILY
Status: DISCONTINUED | OUTPATIENT
Start: 2023-12-31 | End: 2024-01-04 | Stop reason: HOSPADM

## 2023-12-30 RX ORDER — DULOXETIN HYDROCHLORIDE 30 MG/1
60 CAPSULE, DELAYED RELEASE ORAL DAILY
Status: DISCONTINUED | OUTPATIENT
Start: 2023-12-31 | End: 2024-01-04 | Stop reason: HOSPADM

## 2023-12-30 RX ORDER — HYDROCORTISONE 5 MG/1
5 TABLET ORAL NIGHTLY
Status: DISCONTINUED | OUTPATIENT
Start: 2023-12-30 | End: 2024-01-04 | Stop reason: HOSPADM

## 2023-12-30 RX ORDER — ALBUTEROL SULFATE 2.5 MG/3ML
2.5 SOLUTION RESPIRATORY (INHALATION) EVERY 6 HOURS PRN
Status: DISCONTINUED | OUTPATIENT
Start: 2023-12-30 | End: 2024-01-04 | Stop reason: HOSPADM

## 2023-12-30 RX ORDER — BUDESONIDE 0.5 MG/2ML
0.5 INHALANT ORAL
Status: DISCONTINUED | OUTPATIENT
Start: 2023-12-30 | End: 2024-01-04 | Stop reason: HOSPADM

## 2023-12-30 RX ORDER — METOPROLOL SUCCINATE 25 MG/1
25 TABLET, EXTENDED RELEASE ORAL 2 TIMES DAILY
Status: DISCONTINUED | OUTPATIENT
Start: 2023-12-30 | End: 2024-01-01

## 2023-12-30 RX ORDER — ONDANSETRON 4 MG/1
4 TABLET, ORALLY DISINTEGRATING ORAL EVERY 8 HOURS PRN
Status: DISCONTINUED | OUTPATIENT
Start: 2023-12-30 | End: 2024-01-04 | Stop reason: HOSPADM

## 2023-12-30 RX ORDER — HYDROCORTISONE 5 MG/1
15 TABLET ORAL EVERY MORNING
Status: DISCONTINUED | OUTPATIENT
Start: 2023-12-31 | End: 2024-01-04 | Stop reason: HOSPADM

## 2023-12-30 RX ORDER — IPRATROPIUM BROMIDE AND ALBUTEROL SULFATE 2.5; .5 MG/3ML; MG/3ML
1 SOLUTION RESPIRATORY (INHALATION) ONCE
Status: COMPLETED | OUTPATIENT
Start: 2023-12-30 | End: 2023-12-30

## 2023-12-30 RX ORDER — GUAIFENESIN 400 MG/1
400 TABLET ORAL 3 TIMES DAILY PRN
Status: DISCONTINUED | OUTPATIENT
Start: 2023-12-30 | End: 2024-01-04 | Stop reason: HOSPADM

## 2023-12-30 RX ORDER — SODIUM CHLORIDE 0.9 % (FLUSH) 0.9 %
5-40 SYRINGE (ML) INJECTION PRN
Status: DISCONTINUED | OUTPATIENT
Start: 2023-12-30 | End: 2024-01-04 | Stop reason: HOSPADM

## 2023-12-30 RX ORDER — SODIUM CHLORIDE 9 MG/ML
INJECTION, SOLUTION INTRAVENOUS PRN
Status: DISCONTINUED | OUTPATIENT
Start: 2023-12-30 | End: 2024-01-04 | Stop reason: HOSPADM

## 2023-12-30 RX ORDER — OMEGA-3-ACID ETHYL ESTERS 1 G/1
2 CAPSULE, LIQUID FILLED ORAL 2 TIMES DAILY
Status: DISCONTINUED | OUTPATIENT
Start: 2023-12-30 | End: 2024-01-04 | Stop reason: HOSPADM

## 2023-12-30 RX ORDER — ACETAMINOPHEN 325 MG/1
650 TABLET ORAL EVERY 6 HOURS PRN
Status: DISCONTINUED | OUTPATIENT
Start: 2023-12-30 | End: 2024-01-04 | Stop reason: HOSPADM

## 2023-12-30 RX ORDER — FAMOTIDINE 20 MG/1
20 TABLET, FILM COATED ORAL DAILY
Status: DISCONTINUED | OUTPATIENT
Start: 2023-12-31 | End: 2024-01-04 | Stop reason: HOSPADM

## 2023-12-30 RX ORDER — ARFORMOTEROL TARTRATE 15 UG/2ML
15 SOLUTION RESPIRATORY (INHALATION)
Status: DISCONTINUED | OUTPATIENT
Start: 2023-12-30 | End: 2024-01-04 | Stop reason: HOSPADM

## 2023-12-30 RX ORDER — FUROSEMIDE 20 MG/1
20 TABLET ORAL DAILY
Status: DISCONTINUED | OUTPATIENT
Start: 2023-12-31 | End: 2024-01-04 | Stop reason: HOSPADM

## 2023-12-30 RX ORDER — SODIUM CHLORIDE 0.9 % (FLUSH) 0.9 %
5-40 SYRINGE (ML) INJECTION EVERY 12 HOURS SCHEDULED
Status: DISCONTINUED | OUTPATIENT
Start: 2023-12-30 | End: 2024-01-04 | Stop reason: HOSPADM

## 2023-12-30 RX ORDER — POLYETHYLENE GLYCOL 3350 17 G/17G
17 POWDER, FOR SOLUTION ORAL DAILY PRN
Status: DISCONTINUED | OUTPATIENT
Start: 2023-12-30 | End: 2024-01-04 | Stop reason: HOSPADM

## 2023-12-30 RX ORDER — GUAIFENESIN 400 MG/1
400 TABLET ORAL 3 TIMES DAILY
Status: DISCONTINUED | OUTPATIENT
Start: 2023-12-30 | End: 2024-01-04 | Stop reason: HOSPADM

## 2023-12-30 RX ORDER — AMLODIPINE BESYLATE 5 MG/1
5 TABLET ORAL DAILY
Status: DISCONTINUED | OUTPATIENT
Start: 2023-12-31 | End: 2024-01-04 | Stop reason: HOSPADM

## 2023-12-30 RX ORDER — ONDANSETRON 2 MG/ML
4 INJECTION INTRAMUSCULAR; INTRAVENOUS EVERY 6 HOURS PRN
Status: DISCONTINUED | OUTPATIENT
Start: 2023-12-30 | End: 2024-01-04 | Stop reason: HOSPADM

## 2023-12-30 RX ORDER — ACETAMINOPHEN 650 MG/1
650 SUPPOSITORY RECTAL EVERY 6 HOURS PRN
Status: DISCONTINUED | OUTPATIENT
Start: 2023-12-30 | End: 2024-01-04 | Stop reason: HOSPADM

## 2023-12-30 RX ORDER — DESMOPRESSIN ACETATE 0.1 MG/1
200 TABLET ORAL 2 TIMES DAILY
Status: DISCONTINUED | OUTPATIENT
Start: 2023-12-30 | End: 2024-01-04 | Stop reason: HOSPADM

## 2023-12-30 RX ORDER — MULTIVITAMIN WITH IRON
1 TABLET ORAL DAILY
Status: DISCONTINUED | OUTPATIENT
Start: 2023-12-31 | End: 2024-01-04 | Stop reason: HOSPADM

## 2023-12-30 RX ORDER — IPRATROPIUM BROMIDE AND ALBUTEROL SULFATE 2.5; .5 MG/3ML; MG/3ML
1 SOLUTION RESPIRATORY (INHALATION)
Status: DISCONTINUED | OUTPATIENT
Start: 2023-12-30 | End: 2024-01-04 | Stop reason: HOSPADM

## 2023-12-30 RX ORDER — LEVOTHYROXINE SODIUM 0.05 MG/1
50 TABLET ORAL DAILY
Status: DISCONTINUED | OUTPATIENT
Start: 2023-12-31 | End: 2024-01-04 | Stop reason: HOSPADM

## 2023-12-30 RX ORDER — AMIODARONE HYDROCHLORIDE 200 MG/1
50 TABLET ORAL DAILY
Status: DISCONTINUED | OUTPATIENT
Start: 2023-12-31 | End: 2023-12-31

## 2023-12-30 RX ORDER — POTASSIUM CHLORIDE 20 MEQ/1
20 TABLET, EXTENDED RELEASE ORAL DAILY
Status: DISCONTINUED | OUTPATIENT
Start: 2023-12-31 | End: 2024-01-04 | Stop reason: HOSPADM

## 2023-12-30 RX ADMIN — GUAIFENESIN 400 MG: 400 TABLET ORAL at 22:27

## 2023-12-30 RX ADMIN — ARFORMOTEROL TARTRATE 15 MCG: 15 SOLUTION RESPIRATORY (INHALATION) at 21:42

## 2023-12-30 RX ADMIN — IPRATROPIUM BROMIDE AND ALBUTEROL SULFATE 1 DOSE: 2.5; .5 SOLUTION RESPIRATORY (INHALATION) at 20:12

## 2023-12-30 RX ADMIN — METOPROLOL SUCCINATE 25 MG: 25 TABLET, EXTENDED RELEASE ORAL at 22:27

## 2023-12-30 RX ADMIN — IPRATROPIUM BROMIDE AND ALBUTEROL SULFATE 1 DOSE: 2.5; .5 SOLUTION RESPIRATORY (INHALATION) at 13:51

## 2023-12-30 RX ADMIN — BUDESONIDE 500 MCG: 0.5 SUSPENSION RESPIRATORY (INHALATION) at 21:42

## 2023-12-30 RX ADMIN — WATER 80 MG: 1 INJECTION INTRAMUSCULAR; INTRAVENOUS; SUBCUTANEOUS at 16:00

## 2023-12-30 RX ADMIN — OMEGA-3-ACID ETHYL ESTERS 2 G: 1 CAPSULE, LIQUID FILLED ORAL at 22:27

## 2023-12-30 RX ADMIN — APIXABAN 5 MG: 5 TABLET, FILM COATED ORAL at 22:27

## 2023-12-30 RX ADMIN — HYDROCORTISONE 5 MG: 5 TABLET ORAL at 22:27

## 2023-12-30 RX ADMIN — LEVETIRACETAM 500 MG: 500 TABLET, FILM COATED ORAL at 22:27

## 2023-12-30 RX ADMIN — IOPAMIDOL 75 ML: 755 INJECTION, SOLUTION INTRAVENOUS at 19:17

## 2023-12-30 NOTE — H&P
CKTOTAL 64 08/23/2018    CKTOTAL 67 08/24/2017    CKMB 2.2 08/24/2017    CKMB 1.3 10/03/2016    CKMB 1.6 10/03/2016    TROPONINI <0.01 11/11/2019    TROPONINI <0.01 08/12/2019    TROPONINI <0.01 08/23/2018       Imaging Studies:     XR CHEST PORTABLE    Result Date: 12/30/2023  EXAMINATION: ONE XRAY VIEW OF THE CHEST 12/30/2023 2:15 pm COMPARISON: The previous study performed 11/12/2023. HISTORY: ORDERING SYSTEM PROVIDED HISTORY: shortness of breath TECHNOLOGIST PROVIDED HISTORY: Reason for exam:->shortness of breath What reading provider will be dictating this exam?->CRC FINDINGS: Chronic interstitial lung changes are again noted diffusely in bilaterally. COPD is again identified.  Linear opacities are identified in the left lower lung field, probably representing atelectasis.  There is an ovoid opacity identified at the right lung base.  This could represent partial eventration of the right hemidiaphragm.  The presence of a lung consolidation cannot be excluded.  Noncontrast CT scan of the chest can be considered for further evaluation.  There is a tiny nodular density seen in the right upper lung field.  A lung nodule needs to be excluded.  Again, noncontrast CT scan of the chest can be considered for further evaluation.  No active pleural disease is present.  Cardiomegaly is again seen.  The thoracic aorta is again tortuous and atherosclerotic.  The tracheobronchial tree is midline and patent.  Again seen is a thoracic scoliosis, with convexity to the left.  The patient is again status post kyphoplasty of a lower thoracic spine vertebral body.  Multiple thoracic spine compression deformities are again present.     1. Ovoid opacity identified at the right lung base. This could represent partial eventration of the right hemidiaphragm. The presence of a lung consolidation cannot be excluded. Noncontrast CT scan of the chest can be considered for further evaluation. 2. Tiny nodular density seen in the right upper

## 2023-12-30 NOTE — ED PROVIDER NOTES
ST and T wave abnormalities. Heart enzymes revealed BNP 3248 (previously 160), troponin still pending.  First ABG collected demonstrated venous blood PO2 and pCO2. VBG showed hypercapnia but pH within normal range suggesting that patient likely chronically lives at an elevated pCO2 level. Patient clinical does not appear ill enough, she is not demonstrating significantly increased work of breathing, and her O2 sat is appropriate so she does not require ventilatory support at this time.   Respiratory infectious panel was negative.  CXR showed R lung base opacifications; tiny nodular density in R upper lung field; chronic interstitial lung changes and COPD; possible L lower lung field atelectasis. CT chest ordered to further elucidate opacifications and nodular density of lungs.    Patient was dosed w methylprednisolone IV and given duoneb for wheezes she developed during stay in ED.   Given increased sputum production, elevated BNP, and CPAP use at home, there is concern for COPD exacerbation vs cardiogenic etiology of SOB needed hospital admission. CURB 65 (1)          CONSULTS:   IP CONSULT TO FAMILY MEDICINE        PROCEDURES   Unless otherwise noted below, none       CRITICAL CARE TIME (.cct)           I am the Primary Clinician of Record.    FINAL IMPRESSION      1. COPD exacerbation (HCC)    2. Dyspnea and respiratory abnormalities          DISPOSITION/PLAN     DISPOSITION Decision To Admit 12/30/2023 03:25:05 PM      PATIENT REFERRED TO:  No follow-up provider specified.    DISCHARGE MEDICATIONS:  New Prescriptions    No medications on file       DISCONTINUED MEDICATIONS:  Discontinued Medications    No medications on file              (Please note that portions of this note were completed with a voice recognition program.  Efforts were made to edit the dictations but occasionally words are mis-transcribed.)    Junior Álvarez MD (electronically signed)

## 2023-12-31 LAB
ALBUMIN SERPL-MCNC: 3.7 G/DL (ref 3.5–5.2)
ALP SERPL-CCNC: 94 U/L (ref 35–104)
ALT SERPL-CCNC: 12 U/L (ref 0–32)
ANION GAP SERPL CALCULATED.3IONS-SCNC: 14 MMOL/L (ref 7–16)
AST SERPL-CCNC: 20 U/L (ref 0–31)
BASOPHILS # BLD: 0.02 K/UL (ref 0–0.2)
BASOPHILS NFR BLD: 0 % (ref 0–2)
BILIRUB SERPL-MCNC: 0.4 MG/DL (ref 0–1.2)
BUN SERPL-MCNC: 20 MG/DL (ref 6–23)
CALCIUM SERPL-MCNC: 9.2 MG/DL (ref 8.6–10.2)
CHLORIDE SERPL-SCNC: 98 MMOL/L (ref 98–107)
CO2 SERPL-SCNC: 30 MMOL/L (ref 22–29)
CREAT SERPL-MCNC: 0.9 MG/DL (ref 0.5–1)
EOSINOPHIL # BLD: 0.03 K/UL (ref 0.05–0.5)
EOSINOPHILS RELATIVE PERCENT: 1 % (ref 0–6)
ERYTHROCYTE [DISTWIDTH] IN BLOOD BY AUTOMATED COUNT: 14.4 % (ref 11.5–15)
GFR SERPL CREATININE-BSD FRML MDRD: >60 ML/MIN/1.73M2
GLUCOSE SERPL-MCNC: 98 MG/DL (ref 74–99)
HCT VFR BLD AUTO: 31.4 % (ref 34–48)
HGB BLD-MCNC: 9.7 G/DL (ref 11.5–15.5)
IMM GRANULOCYTES # BLD AUTO: <0.03 K/UL (ref 0–0.58)
IMM GRANULOCYTES NFR BLD: 0 % (ref 0–5)
L PNEUMO1 AG UR QL IA.RAPID: NEGATIVE
LYMPHOCYTES NFR BLD: 0.66 K/UL (ref 1.5–4)
LYMPHOCYTES RELATIVE PERCENT: 15 % (ref 20–42)
MCH RBC QN AUTO: 25.9 PG (ref 26–35)
MCHC RBC AUTO-ENTMCNC: 30.9 G/DL (ref 32–34.5)
MCV RBC AUTO: 84 FL (ref 80–99.9)
MONOCYTES NFR BLD: 0.57 K/UL (ref 0.1–0.95)
MONOCYTES NFR BLD: 13 % (ref 2–12)
NEUTROPHILS NFR BLD: 71 % (ref 43–80)
NEUTS SEG NFR BLD: 3.19 K/UL (ref 1.8–7.3)
OSMOLALITY SERPL: 309 MOSM/KG (ref 285–310)
PLATELET # BLD AUTO: 167 K/UL (ref 130–450)
PMV BLD AUTO: 11.5 FL (ref 7–12)
POTASSIUM SERPL-SCNC: 3.6 MMOL/L (ref 3.5–5)
PROCALCITONIN SERPL-MCNC: 0.09 NG/ML (ref 0–0.08)
PROT SERPL-MCNC: 6.4 G/DL (ref 6.4–8.3)
RBC # BLD AUTO: 3.74 M/UL (ref 3.5–5.5)
S PNEUM AG SPEC QL: NEGATIVE
SODIUM SERPL-SCNC: 142 MMOL/L (ref 132–146)
SPECIMEN SOURCE: NORMAL
WBC OTHER # BLD: 4.5 K/UL (ref 4.5–11.5)

## 2023-12-31 PROCEDURE — 99223 1ST HOSP IP/OBS HIGH 75: CPT | Performed by: FAMILY MEDICINE

## 2023-12-31 PROCEDURE — 6370000000 HC RX 637 (ALT 250 FOR IP)

## 2023-12-31 PROCEDURE — 94660 CPAP INITIATION&MGMT: CPT

## 2023-12-31 PROCEDURE — 94640 AIRWAY INHALATION TREATMENT: CPT

## 2023-12-31 PROCEDURE — 83930 ASSAY OF BLOOD OSMOLALITY: CPT

## 2023-12-31 PROCEDURE — 80053 COMPREHEN METABOLIC PANEL: CPT

## 2023-12-31 PROCEDURE — 6360000002 HC RX W HCPCS

## 2023-12-31 PROCEDURE — 2580000003 HC RX 258

## 2023-12-31 PROCEDURE — 99223 1ST HOSP IP/OBS HIGH 75: CPT | Performed by: INTERNAL MEDICINE

## 2023-12-31 PROCEDURE — 85025 COMPLETE CBC W/AUTO DIFF WBC: CPT

## 2023-12-31 PROCEDURE — 2060000000 HC ICU INTERMEDIATE R&B

## 2023-12-31 PROCEDURE — 93005 ELECTROCARDIOGRAM TRACING: CPT | Performed by: INTERNAL MEDICINE

## 2023-12-31 PROCEDURE — APPSS180 APP SPLIT SHARED TIME > 60 MINUTES: Performed by: NURSE PRACTITIONER

## 2023-12-31 PROCEDURE — 2700000000 HC OXYGEN THERAPY PER DAY

## 2023-12-31 RX ORDER — AMIODARONE HYDROCHLORIDE 200 MG/1
100 TABLET ORAL DAILY
Status: DISCONTINUED | OUTPATIENT
Start: 2024-01-01 | End: 2024-01-01

## 2023-12-31 RX ADMIN — POTASSIUM CHLORIDE 20 MEQ: 1500 TABLET, EXTENDED RELEASE ORAL at 10:00

## 2023-12-31 RX ADMIN — DESMOPRESSIN ACETATE 200 MCG: 0.1 TABLET ORAL at 12:07

## 2023-12-31 RX ADMIN — Medication 1000 UNITS: at 10:01

## 2023-12-31 RX ADMIN — IPRATROPIUM BROMIDE AND ALBUTEROL SULFATE 1 DOSE: 2.5; .5 SOLUTION RESPIRATORY (INHALATION) at 10:42

## 2023-12-31 RX ADMIN — BUDESONIDE 500 MCG: 0.5 SUSPENSION RESPIRATORY (INHALATION) at 19:33

## 2023-12-31 RX ADMIN — GUAIFENESIN 400 MG: 400 TABLET ORAL at 10:01

## 2023-12-31 RX ADMIN — BUDESONIDE 500 MCG: 0.5 SUSPENSION RESPIRATORY (INHALATION) at 05:49

## 2023-12-31 RX ADMIN — SODIUM CHLORIDE, PRESERVATIVE FREE 10 ML: 5 INJECTION INTRAVENOUS at 10:16

## 2023-12-31 RX ADMIN — AMLODIPINE BESYLATE 5 MG: 5 TABLET ORAL at 10:01

## 2023-12-31 RX ADMIN — ARFORMOTEROL TARTRATE 15 MCG: 15 SOLUTION RESPIRATORY (INHALATION) at 05:49

## 2023-12-31 RX ADMIN — AMIODARONE HYDROCHLORIDE 50 MG: 200 TABLET ORAL at 10:00

## 2023-12-31 RX ADMIN — ARFORMOTEROL TARTRATE 15 MCG: 15 SOLUTION RESPIRATORY (INHALATION) at 19:33

## 2023-12-31 RX ADMIN — LEVETIRACETAM 500 MG: 500 TABLET, FILM COATED ORAL at 19:58

## 2023-12-31 RX ADMIN — IPRATROPIUM BROMIDE AND ALBUTEROL SULFATE 1 DOSE: 2.5; .5 SOLUTION RESPIRATORY (INHALATION) at 16:11

## 2023-12-31 RX ADMIN — FUROSEMIDE 20 MG: 20 TABLET ORAL at 10:01

## 2023-12-31 RX ADMIN — ACETAMINOPHEN 650 MG: 325 TABLET ORAL at 19:58

## 2023-12-31 RX ADMIN — OMEGA-3-ACID ETHYL ESTERS 2 G: 1 CAPSULE, LIQUID FILLED ORAL at 22:03

## 2023-12-31 RX ADMIN — GUAIFENESIN 400 MG: 400 TABLET ORAL at 19:58

## 2023-12-31 RX ADMIN — FAMOTIDINE 20 MG: 20 TABLET ORAL at 10:01

## 2023-12-31 RX ADMIN — SODIUM CHLORIDE, PRESERVATIVE FREE 10 ML: 5 INJECTION INTRAVENOUS at 19:59

## 2023-12-31 RX ADMIN — LEVOTHYROXINE SODIUM 50 MCG: 0.05 TABLET ORAL at 12:07

## 2023-12-31 RX ADMIN — IPRATROPIUM BROMIDE AND ALBUTEROL SULFATE 1 DOSE: 2.5; .5 SOLUTION RESPIRATORY (INHALATION) at 05:49

## 2023-12-31 RX ADMIN — MULTIVITAMIN TABLET 1 TABLET: TABLET at 10:01

## 2023-12-31 RX ADMIN — METOPROLOL SUCCINATE 25 MG: 25 TABLET, EXTENDED RELEASE ORAL at 19:58

## 2023-12-31 RX ADMIN — APIXABAN 5 MG: 5 TABLET, FILM COATED ORAL at 19:58

## 2023-12-31 RX ADMIN — FERROUS SULFATE TAB 325 MG (65 MG ELEMENTAL FE) 325 MG: 325 (65 FE) TAB at 10:00

## 2023-12-31 RX ADMIN — HYDROCORTISONE 5 MG: 5 TABLET ORAL at 22:03

## 2023-12-31 RX ADMIN — APIXABAN 5 MG: 5 TABLET, FILM COATED ORAL at 10:01

## 2023-12-31 RX ADMIN — DULOXETINE HYDROCHLORIDE 60 MG: 60 CAPSULE, DELAYED RELEASE ORAL at 10:00

## 2023-12-31 RX ADMIN — METOPROLOL SUCCINATE 25 MG: 25 TABLET, EXTENDED RELEASE ORAL at 10:00

## 2023-12-31 RX ADMIN — IPRATROPIUM BROMIDE AND ALBUTEROL SULFATE 1 DOSE: 2.5; .5 SOLUTION RESPIRATORY (INHALATION) at 19:33

## 2023-12-31 RX ADMIN — DESMOPRESSIN ACETATE 200 MCG: 0.1 TABLET ORAL at 22:03

## 2023-12-31 RX ADMIN — LEVETIRACETAM 500 MG: 500 TABLET, FILM COATED ORAL at 10:01

## 2023-12-31 ASSESSMENT — PAIN SCALES - GENERAL: PAINLEVEL_OUTOF10: 5

## 2023-12-31 ASSESSMENT — PAIN - FUNCTIONAL ASSESSMENT: PAIN_FUNCTIONAL_ASSESSMENT: NONE - DENIES PAIN

## 2023-12-31 ASSESSMENT — PAIN DESCRIPTION - LOCATION: LOCATION: HEAD

## 2023-12-31 NOTE — PLAN OF CARE
Problem: Discharge Planning  Goal: Discharge to home or other facility with appropriate resources  Outcome: Progressing  Flowsheets (Taken 12/31/2023 1804)  Discharge to home or other facility with appropriate resources: Identify barriers to discharge with patient and caregiver     Problem: Skin/Tissue Integrity  Goal: Absence of new skin breakdown  Description: 1.  Monitor for areas of redness and/or skin breakdown  2.  Assess vascular access sites hourly  3.  Every 4-6 hours minimum:  Change oxygen saturation probe site  4.  Every 4-6 hours:  If on nasal continuous positive airway pressure, respiratory therapy assess nares and determine need for appliance change or resting period.  Outcome: Progressing

## 2024-01-01 PROBLEM — I48.21 PERMANENT ATRIAL FIBRILLATION (HCC): Status: ACTIVE | Noted: 2024-01-01

## 2024-01-01 LAB
ALBUMIN SERPL-MCNC: 3.6 G/DL (ref 3.5–5.2)
ALP SERPL-CCNC: 90 U/L (ref 35–104)
ALT SERPL-CCNC: 11 U/L (ref 0–32)
ANION GAP SERPL CALCULATED.3IONS-SCNC: 13 MMOL/L (ref 7–16)
AST SERPL-CCNC: 17 U/L (ref 0–31)
BASOPHILS # BLD: 0.03 K/UL (ref 0–0.2)
BASOPHILS NFR BLD: 1 % (ref 0–2)
BILIRUB SERPL-MCNC: 0.3 MG/DL (ref 0–1.2)
BUN SERPL-MCNC: 16 MG/DL (ref 6–23)
CALCIUM SERPL-MCNC: 9.2 MG/DL (ref 8.6–10.2)
CHLORIDE SERPL-SCNC: 101 MMOL/L (ref 98–107)
CO2 SERPL-SCNC: 31 MMOL/L (ref 22–29)
CREAT SERPL-MCNC: 1 MG/DL (ref 0.5–1)
EOSINOPHIL # BLD: 0.35 K/UL (ref 0.05–0.5)
EOSINOPHILS RELATIVE PERCENT: 6 % (ref 0–6)
ERYTHROCYTE [DISTWIDTH] IN BLOOD BY AUTOMATED COUNT: 14.6 % (ref 11.5–15)
GFR SERPL CREATININE-BSD FRML MDRD: >60 ML/MIN/1.73M2
GLUCOSE SERPL-MCNC: 90 MG/DL (ref 74–99)
HCT VFR BLD AUTO: 34.3 % (ref 34–48)
HGB BLD-MCNC: 10.5 G/DL (ref 11.5–15.5)
IMM GRANULOCYTES # BLD AUTO: <0.03 K/UL (ref 0–0.58)
IMM GRANULOCYTES NFR BLD: 0 % (ref 0–5)
LYMPHOCYTES NFR BLD: 0.89 K/UL (ref 1.5–4)
LYMPHOCYTES RELATIVE PERCENT: 15 % (ref 20–42)
MAGNESIUM SERPL-MCNC: 1.9 MG/DL (ref 1.6–2.6)
MCH RBC QN AUTO: 25.7 PG (ref 26–35)
MCHC RBC AUTO-ENTMCNC: 30.6 G/DL (ref 32–34.5)
MCV RBC AUTO: 83.9 FL (ref 80–99.9)
MONOCYTES NFR BLD: 0.64 K/UL (ref 0.1–0.95)
MONOCYTES NFR BLD: 11 % (ref 2–12)
NEUTROPHILS NFR BLD: 69 % (ref 43–80)
NEUTS SEG NFR BLD: 4.19 K/UL (ref 1.8–7.3)
PLATELET # BLD AUTO: 193 K/UL (ref 130–450)
PMV BLD AUTO: 11.1 FL (ref 7–12)
POTASSIUM SERPL-SCNC: 3.4 MMOL/L (ref 3.5–5)
PROT SERPL-MCNC: 6.5 G/DL (ref 6.4–8.3)
RBC # BLD AUTO: 4.09 M/UL (ref 3.5–5.5)
SODIUM SERPL-SCNC: 145 MMOL/L (ref 132–146)
WBC OTHER # BLD: 6.1 K/UL (ref 4.5–11.5)

## 2024-01-01 PROCEDURE — 6370000000 HC RX 637 (ALT 250 FOR IP)

## 2024-01-01 PROCEDURE — 6360000002 HC RX W HCPCS: Performed by: INTERNAL MEDICINE

## 2024-01-01 PROCEDURE — 94660 CPAP INITIATION&MGMT: CPT

## 2024-01-01 PROCEDURE — 85025 COMPLETE CBC W/AUTO DIFF WBC: CPT

## 2024-01-01 PROCEDURE — 99232 SBSQ HOSP IP/OBS MODERATE 35: CPT | Performed by: FAMILY MEDICINE

## 2024-01-01 PROCEDURE — 80053 COMPREHEN METABOLIC PANEL: CPT

## 2024-01-01 PROCEDURE — 36415 COLL VENOUS BLD VENIPUNCTURE: CPT

## 2024-01-01 PROCEDURE — 93005 ELECTROCARDIOGRAM TRACING: CPT | Performed by: STUDENT IN AN ORGANIZED HEALTH CARE EDUCATION/TRAINING PROGRAM

## 2024-01-01 PROCEDURE — 2060000000 HC ICU INTERMEDIATE R&B

## 2024-01-01 PROCEDURE — 2580000003 HC RX 258

## 2024-01-01 PROCEDURE — 94640 AIRWAY INHALATION TREATMENT: CPT

## 2024-01-01 PROCEDURE — 83735 ASSAY OF MAGNESIUM: CPT

## 2024-01-01 PROCEDURE — 99233 SBSQ HOSP IP/OBS HIGH 50: CPT | Performed by: INTERNAL MEDICINE

## 2024-01-01 PROCEDURE — 99223 1ST HOSP IP/OBS HIGH 75: CPT | Performed by: STUDENT IN AN ORGANIZED HEALTH CARE EDUCATION/TRAINING PROGRAM

## 2024-01-01 PROCEDURE — 6360000002 HC RX W HCPCS

## 2024-01-01 PROCEDURE — 6370000000 HC RX 637 (ALT 250 FOR IP): Performed by: STUDENT IN AN ORGANIZED HEALTH CARE EDUCATION/TRAINING PROGRAM

## 2024-01-01 RX ORDER — POTASSIUM CHLORIDE 20 MEQ/1
20 TABLET, EXTENDED RELEASE ORAL ONCE
Status: COMPLETED | OUTPATIENT
Start: 2024-01-01 | End: 2024-01-01

## 2024-01-01 RX ORDER — METOPROLOL SUCCINATE 50 MG/1
50 TABLET, EXTENDED RELEASE ORAL 2 TIMES DAILY
Status: DISCONTINUED | OUTPATIENT
Start: 2024-01-01 | End: 2024-01-04 | Stop reason: HOSPADM

## 2024-01-01 RX ORDER — DIGOXIN 0.25 MG/ML
250 INJECTION INTRAMUSCULAR; INTRAVENOUS DAILY
Status: DISCONTINUED | OUTPATIENT
Start: 2024-01-01 | End: 2024-01-01

## 2024-01-01 RX ORDER — DIGOXIN 0.25 MG/ML
250 INJECTION INTRAMUSCULAR; INTRAVENOUS ONCE
Status: COMPLETED | OUTPATIENT
Start: 2024-01-01 | End: 2024-01-01

## 2024-01-01 RX ORDER — DIGOXIN 0.25 MG/ML
62.5 INJECTION INTRAMUSCULAR; INTRAVENOUS DAILY
Status: DISCONTINUED | OUTPATIENT
Start: 2024-01-02 | End: 2024-01-02

## 2024-01-01 RX ORDER — ACETAMINOPHEN 500 MG
1000 TABLET ORAL ONCE
Status: COMPLETED | OUTPATIENT
Start: 2024-01-01 | End: 2024-01-01

## 2024-01-01 RX ADMIN — SODIUM CHLORIDE, PRESERVATIVE FREE 10 ML: 5 INJECTION INTRAVENOUS at 08:39

## 2024-01-01 RX ADMIN — DESMOPRESSIN ACETATE 200 MCG: 0.1 TABLET ORAL at 19:47

## 2024-01-01 RX ADMIN — GUAIFENESIN 400 MG: 400 TABLET ORAL at 19:47

## 2024-01-01 RX ADMIN — ACETAMINOPHEN 1000 MG: 500 TABLET ORAL at 08:32

## 2024-01-01 RX ADMIN — ACETAMINOPHEN 650 MG: 325 TABLET ORAL at 15:03

## 2024-01-01 RX ADMIN — POTASSIUM CHLORIDE 20 MEQ: 1500 TABLET, EXTENDED RELEASE ORAL at 06:54

## 2024-01-01 RX ADMIN — FERROUS SULFATE TAB 325 MG (65 MG ELEMENTAL FE) 325 MG: 325 (65 FE) TAB at 08:33

## 2024-01-01 RX ADMIN — MULTIVITAMIN TABLET 1 TABLET: TABLET at 08:32

## 2024-01-01 RX ADMIN — APIXABAN 5 MG: 5 TABLET, FILM COATED ORAL at 08:32

## 2024-01-01 RX ADMIN — FAMOTIDINE 20 MG: 20 TABLET ORAL at 08:33

## 2024-01-01 RX ADMIN — FUROSEMIDE 20 MG: 20 TABLET ORAL at 08:33

## 2024-01-01 RX ADMIN — BUDESONIDE 500 MCG: 0.5 SUSPENSION RESPIRATORY (INHALATION) at 08:49

## 2024-01-01 RX ADMIN — ACETAMINOPHEN 650 MG: 325 TABLET ORAL at 19:48

## 2024-01-01 RX ADMIN — POTASSIUM CHLORIDE 20 MEQ: 1500 TABLET, EXTENDED RELEASE ORAL at 08:34

## 2024-01-01 RX ADMIN — HYDROCORTISONE 15 MG: 5 TABLET ORAL at 08:32

## 2024-01-01 RX ADMIN — METOPROLOL SUCCINATE 25 MG: 25 TABLET, EXTENDED RELEASE ORAL at 08:33

## 2024-01-01 RX ADMIN — Medication 1000 UNITS: at 08:33

## 2024-01-01 RX ADMIN — IPRATROPIUM BROMIDE AND ALBUTEROL SULFATE 1 DOSE: 2.5; .5 SOLUTION RESPIRATORY (INHALATION) at 08:49

## 2024-01-01 RX ADMIN — LEVETIRACETAM 500 MG: 500 TABLET, FILM COATED ORAL at 19:48

## 2024-01-01 RX ADMIN — IPRATROPIUM BROMIDE AND ALBUTEROL SULFATE 1 DOSE: 2.5; .5 SOLUTION RESPIRATORY (INHALATION) at 20:06

## 2024-01-01 RX ADMIN — IPRATROPIUM BROMIDE AND ALBUTEROL SULFATE 1 DOSE: 2.5; .5 SOLUTION RESPIRATORY (INHALATION) at 16:19

## 2024-01-01 RX ADMIN — LEVOTHYROXINE SODIUM 50 MCG: 0.05 TABLET ORAL at 08:33

## 2024-01-01 RX ADMIN — ARFORMOTEROL TARTRATE 15 MCG: 15 SOLUTION RESPIRATORY (INHALATION) at 08:48

## 2024-01-01 RX ADMIN — SODIUM CHLORIDE, PRESERVATIVE FREE 10 ML: 5 INJECTION INTRAVENOUS at 19:47

## 2024-01-01 RX ADMIN — IPRATROPIUM BROMIDE AND ALBUTEROL SULFATE 1 DOSE: 2.5; .5 SOLUTION RESPIRATORY (INHALATION) at 13:10

## 2024-01-01 RX ADMIN — APIXABAN 5 MG: 5 TABLET, FILM COATED ORAL at 19:47

## 2024-01-01 RX ADMIN — BUDESONIDE 500 MCG: 0.5 SUSPENSION RESPIRATORY (INHALATION) at 20:06

## 2024-01-01 RX ADMIN — GUAIFENESIN 400 MG: 400 TABLET ORAL at 14:57

## 2024-01-01 RX ADMIN — GUAIFENESIN 400 MG: 400 TABLET ORAL at 08:32

## 2024-01-01 RX ADMIN — LEVETIRACETAM 500 MG: 500 TABLET, FILM COATED ORAL at 08:32

## 2024-01-01 RX ADMIN — ARFORMOTEROL TARTRATE 15 MCG: 15 SOLUTION RESPIRATORY (INHALATION) at 20:06

## 2024-01-01 RX ADMIN — AMLODIPINE BESYLATE 5 MG: 5 TABLET ORAL at 08:32

## 2024-01-01 RX ADMIN — HYDROCORTISONE 5 MG: 5 TABLET ORAL at 19:48

## 2024-01-01 RX ADMIN — OMEGA-3-ACID ETHYL ESTERS 2 G: 1 CAPSULE, LIQUID FILLED ORAL at 08:32

## 2024-01-01 RX ADMIN — DESMOPRESSIN ACETATE 200 MCG: 0.1 TABLET ORAL at 08:33

## 2024-01-01 RX ADMIN — OMEGA-3-ACID ETHYL ESTERS 2 G: 1 CAPSULE, LIQUID FILLED ORAL at 19:47

## 2024-01-01 RX ADMIN — METOPROLOL SUCCINATE 50 MG: 50 TABLET, EXTENDED RELEASE ORAL at 19:47

## 2024-01-01 RX ADMIN — DIGOXIN 250 MCG: 0.25 INJECTION INTRAMUSCULAR; INTRAVENOUS at 15:01

## 2024-01-01 RX ADMIN — AMIODARONE HYDROCHLORIDE 100 MG: 200 TABLET ORAL at 08:32

## 2024-01-01 RX ADMIN — DULOXETINE HYDROCHLORIDE 60 MG: 60 CAPSULE, DELAYED RELEASE ORAL at 08:32

## 2024-01-01 ASSESSMENT — PAIN DESCRIPTION - LOCATION
LOCATION: HEAD
LOCATION: SHOULDER

## 2024-01-01 ASSESSMENT — PAIN SCALES - GENERAL
PAINLEVEL_OUTOF10: 6
PAINLEVEL_OUTOF10: 7
PAINLEVEL_OUTOF10: 7
PAINLEVEL_OUTOF10: 10

## 2024-01-01 ASSESSMENT — PAIN DESCRIPTION - ORIENTATION: ORIENTATION: RIGHT;LEFT

## 2024-01-01 NOTE — PLAN OF CARE
Problem: Discharge Planning  Goal: Discharge to home or other facility with appropriate resources  Outcome: Progressing  Flowsheets (Taken 1/1/2024 0805)  Discharge to home or other facility with appropriate resources: Identify barriers to discharge with patient and caregiver     Problem: Skin/Tissue Integrity  Goal: Absence of new skin breakdown  Description: 1.  Monitor for areas of redness and/or skin breakdown  2.  Assess vascular access sites hourly  3.  Every 4-6 hours minimum:  Change oxygen saturation probe site  4.  Every 4-6 hours:  If on nasal continuous positive airway pressure, respiratory therapy assess nares and determine need for appliance change or resting period.  Outcome: Progressing     Problem: Safety - Adult  Goal: Free from fall injury  Outcome: Progressing     Problem: ABCDS Injury Assessment  Goal: Absence of physical injury  Outcome: Progressing     Problem: Pain  Goal: Verbalizes/displays adequate comfort level or baseline comfort level  Outcome: Progressing

## 2024-01-02 LAB
ALBUMIN SERPL-MCNC: 3.3 G/DL (ref 3.5–5.2)
ALP SERPL-CCNC: 91 U/L (ref 35–104)
ALT SERPL-CCNC: 9 U/L (ref 0–32)
ANION GAP SERPL CALCULATED.3IONS-SCNC: 11 MMOL/L (ref 7–16)
AST SERPL-CCNC: 14 U/L (ref 0–31)
BASOPHILS # BLD: 0.03 K/UL (ref 0–0.2)
BASOPHILS NFR BLD: 1 % (ref 0–2)
BILIRUB SERPL-MCNC: 0.4 MG/DL (ref 0–1.2)
BUN SERPL-MCNC: 17 MG/DL (ref 6–23)
CALCIUM SERPL-MCNC: 9.5 MG/DL (ref 8.6–10.2)
CHLORIDE SERPL-SCNC: 103 MMOL/L (ref 98–107)
CO2 SERPL-SCNC: 29 MMOL/L (ref 22–29)
CREAT SERPL-MCNC: 0.9 MG/DL (ref 0.5–1)
EKG ATRIAL RATE: 119 BPM
EKG ATRIAL RATE: 375 BPM
EKG ATRIAL RATE: 75 BPM
EKG P AXIS: 27 DEGREES
EKG P-R INTERVAL: 138 MS
EKG Q-T INTERVAL: 334 MS
EKG Q-T INTERVAL: 382 MS
EKG Q-T INTERVAL: 430 MS
EKG QRS DURATION: 74 MS
EKG QRS DURATION: 78 MS
EKG QRS DURATION: 80 MS
EKG QTC CALCULATION (BAZETT): 435 MS
EKG QTC CALCULATION (BAZETT): 469 MS
EKG QTC CALCULATION (BAZETT): 480 MS
EKG R AXIS: 75 DEGREES
EKG R AXIS: 82 DEGREES
EKG R AXIS: 91 DEGREES
EKG T AXIS: -7 DEGREES
EKG T AXIS: 151 DEGREES
EKG T AXIS: 28 DEGREES
EKG VENTRICULAR RATE: 102 BPM
EKG VENTRICULAR RATE: 75 BPM
EKG VENTRICULAR RATE: 91 BPM
EOSINOPHIL # BLD: 0.37 K/UL (ref 0.05–0.5)
EOSINOPHILS RELATIVE PERCENT: 8 % (ref 0–6)
ERYTHROCYTE [DISTWIDTH] IN BLOOD BY AUTOMATED COUNT: 14.5 % (ref 11.5–15)
GFR SERPL CREATININE-BSD FRML MDRD: >60 ML/MIN/1.73M2
GLUCOSE SERPL-MCNC: 83 MG/DL (ref 74–99)
HCT VFR BLD AUTO: 33.4 % (ref 34–48)
HGB BLD-MCNC: 10.3 G/DL (ref 11.5–15.5)
IMM GRANULOCYTES # BLD AUTO: <0.03 K/UL (ref 0–0.58)
IMM GRANULOCYTES NFR BLD: 0 % (ref 0–5)
LYMPHOCYTES NFR BLD: 0.74 K/UL (ref 1.5–4)
LYMPHOCYTES RELATIVE PERCENT: 15 % (ref 20–42)
MCH RBC QN AUTO: 25.7 PG (ref 26–35)
MCHC RBC AUTO-ENTMCNC: 30.8 G/DL (ref 32–34.5)
MCV RBC AUTO: 83.3 FL (ref 80–99.9)
MONOCYTES NFR BLD: 0.66 K/UL (ref 0.1–0.95)
MONOCYTES NFR BLD: 14 % (ref 2–12)
NEUTROPHILS NFR BLD: 63 % (ref 43–80)
NEUTS SEG NFR BLD: 3.03 K/UL (ref 1.8–7.3)
PLATELET # BLD AUTO: 196 K/UL (ref 130–450)
PMV BLD AUTO: 11.6 FL (ref 7–12)
POTASSIUM SERPL-SCNC: 4.1 MMOL/L (ref 3.5–5)
PROT SERPL-MCNC: 6.5 G/DL (ref 6.4–8.3)
RBC # BLD AUTO: 4.01 M/UL (ref 3.5–5.5)
SODIUM SERPL-SCNC: 143 MMOL/L (ref 132–146)
WBC OTHER # BLD: 4.8 K/UL (ref 4.5–11.5)

## 2024-01-02 PROCEDURE — 2060000000 HC ICU INTERMEDIATE R&B

## 2024-01-02 PROCEDURE — 93005 ELECTROCARDIOGRAM TRACING: CPT | Performed by: STUDENT IN AN ORGANIZED HEALTH CARE EDUCATION/TRAINING PROGRAM

## 2024-01-02 PROCEDURE — 85025 COMPLETE CBC W/AUTO DIFF WBC: CPT

## 2024-01-02 PROCEDURE — 2580000003 HC RX 258

## 2024-01-02 PROCEDURE — 93010 ELECTROCARDIOGRAM REPORT: CPT | Performed by: INTERNAL MEDICINE

## 2024-01-02 PROCEDURE — 94660 CPAP INITIATION&MGMT: CPT

## 2024-01-02 PROCEDURE — 6370000000 HC RX 637 (ALT 250 FOR IP): Performed by: STUDENT IN AN ORGANIZED HEALTH CARE EDUCATION/TRAINING PROGRAM

## 2024-01-02 PROCEDURE — 6360000002 HC RX W HCPCS: Performed by: INTERNAL MEDICINE

## 2024-01-02 PROCEDURE — 6370000000 HC RX 637 (ALT 250 FOR IP): Performed by: NURSE PRACTITIONER

## 2024-01-02 PROCEDURE — 6370000000 HC RX 637 (ALT 250 FOR IP)

## 2024-01-02 PROCEDURE — 80053 COMPREHEN METABOLIC PANEL: CPT

## 2024-01-02 PROCEDURE — 94640 AIRWAY INHALATION TREATMENT: CPT

## 2024-01-02 PROCEDURE — 2700000000 HC OXYGEN THERAPY PER DAY

## 2024-01-02 PROCEDURE — 36415 COLL VENOUS BLD VENIPUNCTURE: CPT

## 2024-01-02 PROCEDURE — 6370000000 HC RX 637 (ALT 250 FOR IP): Performed by: PHYSICIAN ASSISTANT

## 2024-01-02 PROCEDURE — 6360000002 HC RX W HCPCS

## 2024-01-02 PROCEDURE — 99232 SBSQ HOSP IP/OBS MODERATE 35: CPT | Performed by: FAMILY MEDICINE

## 2024-01-02 RX ORDER — SILDENAFIL CITRATE 20 MG/1
10 TABLET ORAL 3 TIMES DAILY
Qty: 30 TABLET | Refills: 3 | Status: SHIPPED | OUTPATIENT
Start: 2024-01-02

## 2024-01-02 RX ORDER — DIGOXIN 125 MCG
62.5 TABLET ORAL DAILY
Status: DISCONTINUED | OUTPATIENT
Start: 2024-01-02 | End: 2024-01-04 | Stop reason: HOSPADM

## 2024-01-02 RX ORDER — HYDROXYZINE HYDROCHLORIDE 10 MG/1
10 TABLET, FILM COATED ORAL 3 TIMES DAILY PRN
Status: DISCONTINUED | OUTPATIENT
Start: 2024-01-02 | End: 2024-01-03

## 2024-01-02 RX ORDER — SILDENAFIL CITRATE 20 MG/1
10 TABLET ORAL 3 TIMES DAILY
Status: DISCONTINUED | OUTPATIENT
Start: 2024-01-02 | End: 2024-01-04 | Stop reason: HOSPADM

## 2024-01-02 RX ADMIN — METOPROLOL SUCCINATE 50 MG: 50 TABLET, EXTENDED RELEASE ORAL at 14:34

## 2024-01-02 RX ADMIN — OMEGA-3-ACID ETHYL ESTERS 2 G: 1 CAPSULE, LIQUID FILLED ORAL at 14:34

## 2024-01-02 RX ADMIN — GUAIFENESIN 400 MG: 400 TABLET ORAL at 21:42

## 2024-01-02 RX ADMIN — IPRATROPIUM BROMIDE AND ALBUTEROL SULFATE 1 DOSE: 2.5; .5 SOLUTION RESPIRATORY (INHALATION) at 13:18

## 2024-01-02 RX ADMIN — LEVETIRACETAM 500 MG: 500 TABLET, FILM COATED ORAL at 21:43

## 2024-01-02 RX ADMIN — FUROSEMIDE 20 MG: 20 TABLET ORAL at 14:36

## 2024-01-02 RX ADMIN — FAMOTIDINE 20 MG: 20 TABLET ORAL at 14:35

## 2024-01-02 RX ADMIN — APIXABAN 5 MG: 5 TABLET, FILM COATED ORAL at 14:35

## 2024-01-02 RX ADMIN — IPRATROPIUM BROMIDE AND ALBUTEROL SULFATE 1 DOSE: 2.5; .5 SOLUTION RESPIRATORY (INHALATION) at 16:52

## 2024-01-02 RX ADMIN — DESMOPRESSIN ACETATE 200 MCG: 0.1 TABLET ORAL at 21:42

## 2024-01-02 RX ADMIN — DULOXETINE HYDROCHLORIDE 60 MG: 60 CAPSULE, DELAYED RELEASE ORAL at 14:34

## 2024-01-02 RX ADMIN — LEVETIRACETAM 500 MG: 500 TABLET, FILM COATED ORAL at 14:35

## 2024-01-02 RX ADMIN — LEVOTHYROXINE SODIUM 50 MCG: 0.05 TABLET ORAL at 14:35

## 2024-01-02 RX ADMIN — SILDENAFIL 10 MG: 20 TABLET ORAL at 17:54

## 2024-01-02 RX ADMIN — ACETAMINOPHEN 650 MG: 325 TABLET ORAL at 18:20

## 2024-01-02 RX ADMIN — BUDESONIDE 500 MCG: 0.5 SUSPENSION RESPIRATORY (INHALATION) at 09:15

## 2024-01-02 RX ADMIN — DESMOPRESSIN ACETATE 200 MCG: 0.1 TABLET ORAL at 14:34

## 2024-01-02 RX ADMIN — METOPROLOL SUCCINATE 50 MG: 50 TABLET, EXTENDED RELEASE ORAL at 21:43

## 2024-01-02 RX ADMIN — SODIUM CHLORIDE, PRESERVATIVE FREE 10 ML: 5 INJECTION INTRAVENOUS at 09:56

## 2024-01-02 RX ADMIN — ARFORMOTEROL TARTRATE 15 MCG: 15 SOLUTION RESPIRATORY (INHALATION) at 09:15

## 2024-01-02 RX ADMIN — DIGOXIN 62.5 MCG: 0.25 INJECTION INTRAMUSCULAR; INTRAVENOUS at 09:56

## 2024-01-02 RX ADMIN — AMLODIPINE BESYLATE 5 MG: 5 TABLET ORAL at 14:36

## 2024-01-02 RX ADMIN — DIGOXIN 62.5 MCG: 125 TABLET ORAL at 17:10

## 2024-01-02 RX ADMIN — MULTIVITAMIN TABLET 1 TABLET: TABLET at 14:34

## 2024-01-02 RX ADMIN — APIXABAN 5 MG: 5 TABLET, FILM COATED ORAL at 21:43

## 2024-01-02 RX ADMIN — POTASSIUM CHLORIDE 20 MEQ: 1500 TABLET, EXTENDED RELEASE ORAL at 14:36

## 2024-01-02 RX ADMIN — Medication 1000 UNITS: at 14:36

## 2024-01-02 RX ADMIN — OMEGA-3-ACID ETHYL ESTERS 2 G: 1 CAPSULE, LIQUID FILLED ORAL at 21:42

## 2024-01-02 RX ADMIN — SODIUM CHLORIDE, PRESERVATIVE FREE 10 ML: 5 INJECTION INTRAVENOUS at 21:43

## 2024-01-02 RX ADMIN — FERROUS SULFATE TAB 325 MG (65 MG ELEMENTAL FE) 325 MG: 325 (65 FE) TAB at 14:34

## 2024-01-02 RX ADMIN — IPRATROPIUM BROMIDE AND ALBUTEROL SULFATE 1 DOSE: 2.5; .5 SOLUTION RESPIRATORY (INHALATION) at 09:15

## 2024-01-02 RX ADMIN — IPRATROPIUM BROMIDE AND ALBUTEROL SULFATE 1 DOSE: 2.5; .5 SOLUTION RESPIRATORY (INHALATION) at 21:13

## 2024-01-02 RX ADMIN — ARFORMOTEROL TARTRATE 15 MCG: 15 SOLUTION RESPIRATORY (INHALATION) at 21:13

## 2024-01-02 RX ADMIN — HYDROCORTISONE 15 MG: 5 TABLET ORAL at 14:35

## 2024-01-02 RX ADMIN — HYDROCORTISONE 5 MG: 5 TABLET ORAL at 21:43

## 2024-01-02 RX ADMIN — BUDESONIDE 500 MCG: 0.5 SUSPENSION RESPIRATORY (INHALATION) at 21:13

## 2024-01-02 RX ADMIN — SILDENAFIL 10 MG: 20 TABLET ORAL at 21:44

## 2024-01-02 RX ADMIN — GUAIFENESIN 400 MG: 400 TABLET ORAL at 14:36

## 2024-01-02 ASSESSMENT — PAIN SCALES - GENERAL
PAINLEVEL_OUTOF10: 0
PAINLEVEL_OUTOF10: 0
PAINLEVEL_OUTOF10: 3
PAINLEVEL_OUTOF10: 0
PAINLEVEL_OUTOF10: 0

## 2024-01-02 ASSESSMENT — PAIN DESCRIPTION - LOCATION: LOCATION: HEAD

## 2024-01-02 NOTE — ACP (ADVANCE CARE PLANNING)
Advance Care Planning   Healthcare Decision Maker:    Primary Decision Maker: Jennifer,Reyes Finney - Spouse - [phone]    Secondary Decision Maker: ELIER AVILES - Child - [phone]    Click here to complete Healthcare Decision Makers including selection of the Healthcare Decision Maker Relationship (ie \"Primary\").

## 2024-01-02 NOTE — PLAN OF CARE
Given patient's increased oxygen requirements, will continue inpatient management at this time. Continue to wean oxygen as-tolerated.     Izabella Ludwig DO  01/02/24  5:21 PM

## 2024-01-02 NOTE — DISCHARGE INSTRUCTIONS
=====================================  [facility]  DISCHARGE INSTRUCTIONS  =====================================    Take your medications as directed in this summary    Future scheduled appointments are listed below or recommended appointments are mentioned above.    Future Appointments   Date Time Provider Department Center   1/8/2024 12:30 PM Edu Olivas MD BDM ENDO [facility]   1/10/2024  1:00 PM Bobby Swan MD [facility] [place] [facility]   2/13/2024  2:40 PM Efrain Roy, DO [facility]   3/12/2024  2:40 PM Joya Garcia MD [facility]       Follow up with the specialists that saw you during your stay as well. If you have any questions call your PCP at [phone].    Once discharged from [facility], you can :     Activity : As tolerated  Stairs : As tolerated  Exercise : As tolerated  Lifting : As tolerated   Sexual activity : Yes  Medications : Always take your medications as prescribed  Wound Care: none needed  Diet : You are asked to make an attempt to improve diet and exercise patterns to aid in medical management of your medical condition/problem.     Call [facility] with any further questions. Return to Emergency Department with any worsening of your condition and/or fever greater than 101 degrees, new weakness, shortness of breath or chest pain.                             HEART FAILURE  / CONGESTIVE HEART FAILURE  DISCHARGE INSTRUCTIONS:  GUIDELINES TO FOLLOW AT HOME    Self- Managed Care:     MEDICATIONS:  Take your medication as directed. If you are experiencing any side effects, inform your doctor, Do not stop taking any of your medications without letting your doctor know.   Check with your doctor before taking any over-the-counter medications / herbal / or dietary supplements. They may interfere with your other medications.  Do not take ibuprofen (Advil or Motrin) and naproxen (Aleve) without talking to your doctor first.

## 2024-01-02 NOTE — PLAN OF CARE
Received page from nurse stating that patient wants to be discharged. Advised that she needs to be monitored due to increase in O2 requirements during ambulatory pulse ox. She was also having CP. EKG showed AF RVR and PVCs. CP is not worse than usual and nurse stated that it might be due to anxiety. Denies SOB.     Vitals:    01/02/24 1808   BP: 104/84   Pulse: 80   Resp:    Temp:    SpO2:        Plan:  Obtain O2 sat  Hydroxyzine PRN for anxiety   Consider CXR if desaturating       Electronically signed by Belia Hsieh MD on 1/2/2024 at 6:13 PM

## 2024-01-03 LAB
ALBUMIN SERPL-MCNC: 3.3 G/DL (ref 3.5–5.2)
ALP SERPL-CCNC: 84 U/L (ref 35–104)
ALT SERPL-CCNC: 8 U/L (ref 0–32)
ANION GAP SERPL CALCULATED.3IONS-SCNC: 12 MMOL/L (ref 7–16)
AST SERPL-CCNC: 13 U/L (ref 0–31)
BASOPHILS # BLD: 0.02 K/UL (ref 0–0.2)
BASOPHILS NFR BLD: 1 % (ref 0–2)
BILIRUB SERPL-MCNC: 0.3 MG/DL (ref 0–1.2)
BUN SERPL-MCNC: 16 MG/DL (ref 6–23)
CALCIUM SERPL-MCNC: 9.6 MG/DL (ref 8.6–10.2)
CHLORIDE SERPL-SCNC: 101 MMOL/L (ref 98–107)
CO2 SERPL-SCNC: 30 MMOL/L (ref 22–29)
CREAT SERPL-MCNC: 1 MG/DL (ref 0.5–1)
EOSINOPHIL # BLD: 0.29 K/UL (ref 0.05–0.5)
EOSINOPHILS RELATIVE PERCENT: 7 % (ref 0–6)
ERYTHROCYTE [DISTWIDTH] IN BLOOD BY AUTOMATED COUNT: 14.4 % (ref 11.5–15)
GFR SERPL CREATININE-BSD FRML MDRD: 60 ML/MIN/1.73M2
GLUCOSE SERPL-MCNC: 103 MG/DL (ref 74–99)
HCT VFR BLD AUTO: 31.9 % (ref 34–48)
HGB BLD-MCNC: 9.5 G/DL (ref 11.5–15.5)
IMM GRANULOCYTES # BLD AUTO: <0.03 K/UL (ref 0–0.58)
IMM GRANULOCYTES NFR BLD: 0 % (ref 0–5)
LYMPHOCYTES NFR BLD: 0.78 K/UL (ref 1.5–4)
LYMPHOCYTES RELATIVE PERCENT: 18 % (ref 20–42)
MCH RBC QN AUTO: 25.3 PG (ref 26–35)
MCHC RBC AUTO-ENTMCNC: 29.8 G/DL (ref 32–34.5)
MCV RBC AUTO: 84.8 FL (ref 80–99.9)
MONOCYTES NFR BLD: 0.51 K/UL (ref 0.1–0.95)
MONOCYTES NFR BLD: 12 % (ref 2–12)
NEUTROPHILS NFR BLD: 63 % (ref 43–80)
NEUTS SEG NFR BLD: 2.74 K/UL (ref 1.8–7.3)
PLATELET # BLD AUTO: 192 K/UL (ref 130–450)
PMV BLD AUTO: 11.6 FL (ref 7–12)
POTASSIUM SERPL-SCNC: 4.1 MMOL/L (ref 3.5–5)
PROT SERPL-MCNC: 6.3 G/DL (ref 6.4–8.3)
RBC # BLD AUTO: 3.76 M/UL (ref 3.5–5.5)
SODIUM SERPL-SCNC: 143 MMOL/L (ref 132–146)
WBC OTHER # BLD: 4.4 K/UL (ref 4.5–11.5)

## 2024-01-03 PROCEDURE — 6370000000 HC RX 637 (ALT 250 FOR IP): Performed by: NURSE PRACTITIONER

## 2024-01-03 PROCEDURE — 99232 SBSQ HOSP IP/OBS MODERATE 35: CPT | Performed by: FAMILY MEDICINE

## 2024-01-03 PROCEDURE — 94640 AIRWAY INHALATION TREATMENT: CPT

## 2024-01-03 PROCEDURE — 6370000000 HC RX 637 (ALT 250 FOR IP)

## 2024-01-03 PROCEDURE — 2700000000 HC OXYGEN THERAPY PER DAY

## 2024-01-03 PROCEDURE — 94660 CPAP INITIATION&MGMT: CPT

## 2024-01-03 PROCEDURE — 97165 OT EVAL LOW COMPLEX 30 MIN: CPT

## 2024-01-03 PROCEDURE — 36415 COLL VENOUS BLD VENIPUNCTURE: CPT

## 2024-01-03 PROCEDURE — 2580000003 HC RX 258

## 2024-01-03 PROCEDURE — 6370000000 HC RX 637 (ALT 250 FOR IP): Performed by: PHYSICIAN ASSISTANT

## 2024-01-03 PROCEDURE — 85025 COMPLETE CBC W/AUTO DIFF WBC: CPT

## 2024-01-03 PROCEDURE — 2060000000 HC ICU INTERMEDIATE R&B

## 2024-01-03 PROCEDURE — 6370000000 HC RX 637 (ALT 250 FOR IP): Performed by: STUDENT IN AN ORGANIZED HEALTH CARE EDUCATION/TRAINING PROGRAM

## 2024-01-03 PROCEDURE — 80053 COMPREHEN METABOLIC PANEL: CPT

## 2024-01-03 PROCEDURE — 6360000002 HC RX W HCPCS

## 2024-01-03 PROCEDURE — 97535 SELF CARE MNGMENT TRAINING: CPT

## 2024-01-03 RX ORDER — SENNA AND DOCUSATE SODIUM 50; 8.6 MG/1; MG/1
2 TABLET, FILM COATED ORAL 2 TIMES DAILY
Status: DISCONTINUED | OUTPATIENT
Start: 2024-01-03 | End: 2024-01-04 | Stop reason: HOSPADM

## 2024-01-03 RX ORDER — LIDOCAINE 4 G/G
1 PATCH TOPICAL DAILY
Status: DISCONTINUED | OUTPATIENT
Start: 2024-01-03 | End: 2024-01-04 | Stop reason: HOSPADM

## 2024-01-03 RX ADMIN — GUAIFENESIN 400 MG: 400 TABLET ORAL at 15:28

## 2024-01-03 RX ADMIN — FERROUS SULFATE TAB 325 MG (65 MG ELEMENTAL FE) 325 MG: 325 (65 FE) TAB at 12:09

## 2024-01-03 RX ADMIN — GUAIFENESIN 400 MG: 400 TABLET ORAL at 20:45

## 2024-01-03 RX ADMIN — IPRATROPIUM BROMIDE AND ALBUTEROL SULFATE 1 DOSE: 2.5; .5 SOLUTION RESPIRATORY (INHALATION) at 15:38

## 2024-01-03 RX ADMIN — LEVETIRACETAM 500 MG: 500 TABLET, FILM COATED ORAL at 20:44

## 2024-01-03 RX ADMIN — OMEGA-3-ACID ETHYL ESTERS 2 G: 1 CAPSULE, LIQUID FILLED ORAL at 20:44

## 2024-01-03 RX ADMIN — FAMOTIDINE 20 MG: 20 TABLET ORAL at 12:11

## 2024-01-03 RX ADMIN — AMLODIPINE BESYLATE 5 MG: 5 TABLET ORAL at 12:11

## 2024-01-03 RX ADMIN — SENNOSIDES AND DOCUSATE SODIUM 2 TABLET: 50; 8.6 TABLET ORAL at 15:29

## 2024-01-03 RX ADMIN — GUAIFENESIN 400 MG: 400 TABLET ORAL at 12:09

## 2024-01-03 RX ADMIN — LEVETIRACETAM 500 MG: 500 TABLET, FILM COATED ORAL at 12:11

## 2024-01-03 RX ADMIN — ACETAMINOPHEN 650 MG: 325 TABLET ORAL at 12:20

## 2024-01-03 RX ADMIN — POLYVINYL ALCOHOL, POVIDONE 1 DROP: 14; 6 SOLUTION/ DROPS OPHTHALMIC at 12:21

## 2024-01-03 RX ADMIN — METOPROLOL SUCCINATE 50 MG: 50 TABLET, EXTENDED RELEASE ORAL at 12:10

## 2024-01-03 RX ADMIN — ARFORMOTEROL TARTRATE 15 MCG: 15 SOLUTION RESPIRATORY (INHALATION) at 19:03

## 2024-01-03 RX ADMIN — MULTIVITAMIN TABLET 1 TABLET: TABLET at 12:11

## 2024-01-03 RX ADMIN — BUDESONIDE 500 MCG: 0.5 SUSPENSION RESPIRATORY (INHALATION) at 19:03

## 2024-01-03 RX ADMIN — SILDENAFIL 10 MG: 20 TABLET ORAL at 12:09

## 2024-01-03 RX ADMIN — APIXABAN 5 MG: 5 TABLET, FILM COATED ORAL at 12:11

## 2024-01-03 RX ADMIN — HYDROCORTISONE 5 MG: 5 TABLET ORAL at 20:43

## 2024-01-03 RX ADMIN — IPRATROPIUM BROMIDE AND ALBUTEROL SULFATE 1 DOSE: 2.5; .5 SOLUTION RESPIRATORY (INHALATION) at 08:39

## 2024-01-03 RX ADMIN — ARFORMOTEROL TARTRATE 15 MCG: 15 SOLUTION RESPIRATORY (INHALATION) at 08:39

## 2024-01-03 RX ADMIN — DULOXETINE HYDROCHLORIDE 60 MG: 60 CAPSULE, DELAYED RELEASE ORAL at 12:12

## 2024-01-03 RX ADMIN — POTASSIUM CHLORIDE 20 MEQ: 1500 TABLET, EXTENDED RELEASE ORAL at 12:12

## 2024-01-03 RX ADMIN — DIGOXIN 62.5 MCG: 125 TABLET ORAL at 12:09

## 2024-01-03 RX ADMIN — SODIUM CHLORIDE, PRESERVATIVE FREE 10 ML: 5 INJECTION INTRAVENOUS at 12:08

## 2024-01-03 RX ADMIN — LEVOTHYROXINE SODIUM 50 MCG: 0.05 TABLET ORAL at 12:11

## 2024-01-03 RX ADMIN — Medication 1000 UNITS: at 12:10

## 2024-01-03 RX ADMIN — SENNOSIDES AND DOCUSATE SODIUM 2 TABLET: 50; 8.6 TABLET ORAL at 20:44

## 2024-01-03 RX ADMIN — OMEGA-3-ACID ETHYL ESTERS 2 G: 1 CAPSULE, LIQUID FILLED ORAL at 12:10

## 2024-01-03 RX ADMIN — IPRATROPIUM BROMIDE AND ALBUTEROL SULFATE 1 DOSE: 2.5; .5 SOLUTION RESPIRATORY (INHALATION) at 19:03

## 2024-01-03 RX ADMIN — POLYVINYL ALCOHOL, POVIDONE 1 DROP: 14; 6 SOLUTION/ DROPS OPHTHALMIC at 20:47

## 2024-01-03 RX ADMIN — HYDROCORTISONE 15 MG: 5 TABLET ORAL at 12:09

## 2024-01-03 RX ADMIN — SILDENAFIL 10 MG: 20 TABLET ORAL at 15:28

## 2024-01-03 RX ADMIN — FUROSEMIDE 20 MG: 20 TABLET ORAL at 12:10

## 2024-01-03 RX ADMIN — SILDENAFIL 10 MG: 20 TABLET ORAL at 20:44

## 2024-01-03 RX ADMIN — DESMOPRESSIN ACETATE 200 MCG: 0.1 TABLET ORAL at 20:44

## 2024-01-03 RX ADMIN — BUDESONIDE 500 MCG: 0.5 SUSPENSION RESPIRATORY (INHALATION) at 08:39

## 2024-01-03 RX ADMIN — SODIUM CHLORIDE, PRESERVATIVE FREE 10 ML: 5 INJECTION INTRAVENOUS at 20:46

## 2024-01-03 RX ADMIN — APIXABAN 5 MG: 5 TABLET, FILM COATED ORAL at 20:43

## 2024-01-03 RX ADMIN — DESMOPRESSIN ACETATE 200 MCG: 0.1 TABLET ORAL at 12:10

## 2024-01-03 RX ADMIN — ACETAMINOPHEN 650 MG: 325 TABLET ORAL at 20:45

## 2024-01-03 RX ADMIN — METOPROLOL SUCCINATE 50 MG: 50 TABLET, EXTENDED RELEASE ORAL at 20:45

## 2024-01-03 ASSESSMENT — PAIN DESCRIPTION - DESCRIPTORS
DESCRIPTORS: ACHING;DISCOMFORT;NAGGING
DESCRIPTORS: DISCOMFORT

## 2024-01-03 ASSESSMENT — PAIN - FUNCTIONAL ASSESSMENT: PAIN_FUNCTIONAL_ASSESSMENT: ACTIVITIES ARE NOT PREVENTED

## 2024-01-03 ASSESSMENT — PAIN DESCRIPTION - ONSET: ONSET: ON-GOING

## 2024-01-03 ASSESSMENT — ENCOUNTER SYMPTOMS
SHORTNESS OF BREATH: 1
COLOR CHANGE: 0
FACIAL SWELLING: 0
CONSTIPATION: 1
DIARRHEA: 1
VOMITING: 0
ABDOMINAL PAIN: 0
TROUBLE SWALLOWING: 0
NAUSEA: 0
COUGH: 0
ABDOMINAL DISTENTION: 0

## 2024-01-03 ASSESSMENT — PAIN DESCRIPTION - PAIN TYPE: TYPE: ACUTE PAIN

## 2024-01-03 ASSESSMENT — PAIN SCALES - GENERAL
PAINLEVEL_OUTOF10: 4
PAINLEVEL_OUTOF10: 9
PAINLEVEL_OUTOF10: 4
PAINLEVEL_OUTOF10: 0
PAINLEVEL_OUTOF10: 8

## 2024-01-03 ASSESSMENT — PAIN DESCRIPTION - LOCATION
LOCATION: HEAD
LOCATION: CHEST

## 2024-01-03 ASSESSMENT — PAIN DESCRIPTION - FREQUENCY: FREQUENCY: CONTINUOUS

## 2024-01-03 ASSESSMENT — PAIN DESCRIPTION - ORIENTATION: ORIENTATION: MID

## 2024-01-03 NOTE — CONSULTS
[facility]   Inpatient CHF Nurse Navigator Consult      Cardiologist: Dr. Jose YOUSIF Jennifer is a 68 y.o. (1955) female with a history of HFpEF, most recent EF: 60% 8/29/23  Lab Results   Component Value Date    LVEF 60 08/29/2023       Patient was awake and alert, laying in bed during the consultation and is agreeable to heart failure education. She was engaged and asked appropriate questions throughout the education session.   She was seen in the CHF clinic in the past but is no longer ambulatory and is not a candidate for the CHF clinic.     Barriers identified during consult contributing to HF Hospitalization:  [] Limited medication adherence   [] Poor health literacy, education regarding HF medications provided   [] Pill box provided to patient  [] Difficulty affording medications  [] Prescription assistance information given     [] Not weighing themselves daily  [x] Weight log provided for easy monitoring  [] Scale provided     [] Not following low sodium diet  [] Food insecurity   [x] 2 gram sodium diet education provided   [] Low sodium recipes provided  [] Sodium free seasoning provided   [] Low sodium meal delivery options given to patient  [] Dietician consulted     [] Lack of transportation to appointments     [] Depression, given chronic illness  [] Primary team notified     [] Goals of care need addressed  [] Palliative care consulted     [] CHF CHW consulted, to assist with         Chart Reviewed:  Diet: ADULT DIET; Easy to Chew; 3 carb choices (45 gm/meal); Low Sodium (2 gm)  ADULT ORAL NUTRITION SUPPLEMENT; Breakfast, Dinner; Standard High Calorie/High Protein Oral Supplement   Daily Weights: Patient Vitals for the past 96 hrs (Last 3 readings):   Weight   12/31/23 1815 56.8 kg (125 lb 4 oz)     I/O:   Intake/Output Summary (Last 24 hours) at 1/3/2024 0936  Last data filed at 1/3/2024 0500  Gross per 24 hour   Intake 660 ml   Output --   Net 660 ml 
    Cecilio Donovan M.D.,[facility]  Shaheed Agudelo D.O., GAB., [facility]  Joseph Agarwal M.D.  Abby Black M.D.   Efrain Roy D.O.      Patient:  Rebekah Rodriguez 68 y.o. female MRN: [id number]     Date of Service: 1/1/2024      PULMONARY CONSULTATION    Reason for Consultation: Shortness of breath, extensive pulmonary history  Referring Physician: Dr. Castillo    Communication with the referring physician will be sent via the electronic medical record.    Chief Complaint: Shortness of breath    CODE STATUS: Full code    SUBJECTIVE:  HPI:  Rebekah Rodriguez is a 68 y.o. female well-known to our practice from previous admissions.  She has an extensive past medical history but includes pulmonary sarcoidosis diagnosed in 1998, COPD on home oxygen, DM 2, diabetes insipidus, hypertension,  depression, A-fib, hypothyroidism, GERD, ischemic colitis status post colectomy, and CKD.   She presented to the ED on 12/30 with complaints of shortness of breath and productive cough with yellow sputum.  She says she felt ill for the past month.  She was using her CPAP continuously day and night.  EKG showed A-fib with concern for ST and T wave abnormalities, BNP 3248, blood gases showed compensated hypercapnia, respiratory panel was negative, chest x-ray showed right lung base opacifications with a nodular density in the right upper lung field, chronic interstitial lung changes and COPD, possible left lower lung field atelectasis.  CT chest showed a stable nodular opacity in the inferior aspect of the right middle lobe, irregular opacities throughout both lungs, emphysema, cardiomegaly, pulmonary arterial hypertension, and multiple compression fractures of the thoracic spine.    Today, she was seen and examined lying in bed in no apparent distress.  She was on 6 L oxygen and had a few basilar crackles to auscultation.  She wears CPAP 8 cm at home.      Past Medical History:   Diagnosis Date    A-fib ([facility])     follows with Dr Marquez 
Consult was sent and has been read  
GENERAL SURGERY  CONSULT NOTE  1/3/2024    Physician Consulted: Dr. Meneses  Reason for Consult: Rectal prolapse  Referring Physician: Dr. Oziel GARCIA  Rebekah Rodriguez is a 68 y.o. female who presents for evaluation of dyspnea.  Patient was seen in the emergency department 12/30 for shortness of breath.  She has had this issue intermittently for some time now.  Patient's medical history is pertinent for atrial fibrillation, CKD and CHF.  General surgery consulted for concerns of rectal prolapse.  Patient does have a midline scar with impressive ventral hernia that is soft and nontender.  She does not remember the details of her surgical history.  Per EMR review it states that she had a partial colectomy secondary to ischemic colitis.  Currently she denies any abdominal pain nausea or emesis.  She does state that she suffers from intermittent constipation that is relieved with diarrhea.  Per chart review, last sigmoidoscopy was March/21, at that time she had proctoscopy with hemorrhoid banding.      Past Medical History:   Diagnosis Date    A-fib ([facility])     follows with Dr Marquez    Abnormal mammogram 2010    Acute on chronic respiratory failure ([facility]) 05/05/2017    Anemia     Anemia due to chronic illness     Ankle fracture, left 2008    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[facility]) 03/26/2007    Backache     Benign hypertension     CHF (congestive heart failure) ([facility])     Chronic back pain     Chronic kidney disease     stage 3    Chronic pain disorder     Chronic, continuous use of opioids     Compression fracture of thoracic vertebra ([facility])     T10    COPD (chronic obstructive pulmonary disease) ([facility])     Debility     Deformity of ankle and foot, acquired 01/31/2011    Depression     Diabetes insipidus ([facility])     Diverticulitis     Dry eyes     Encephalopathy acute 05/05/2017    Gait disturbance     GERD (gastroesophageal reflux disease)     Hemorrhoids 01/13/2012    Hernia     History of blood transfusion approx 
CHF (congestive heart failure) ([facility])     Chronic back pain     Chronic kidney disease     stage 3    Chronic pain disorder     Chronic, continuous use of opioids     Compression fracture of thoracic vertebra ([facility])     T10    COPD (chronic obstructive pulmonary disease) ([facility])     Debility     Deformity of ankle and foot, acquired 01/31/2011    Depression     Diabetes insipidus ([facility])     Diverticulitis     Dry eyes     Encephalopathy acute 05/05/2017    Gait disturbance     GERD (gastroesophageal reflux disease)     Hemorrhoids 01/13/2012    Hernia     History of blood transfusion approx 05/2017    History of Clostridium difficile     negative culture 12-15-15; resolved    Hyperlipidemia     Hyperthyroidism     Hypothyroidism     Ischemic colitis, enteritis, or enterocolitis ([facility])     Long term (current) use of systemic steroids 07/10/2022    Long-term current use of steroids     Lumbar disc herniation     Myalgia and myositis, unspecified     Nephrosclerosis     Nonunion of fracture 02/22/2011    Osteoarthritis     severe    PAF (paroxysmal atrial fibrillation) ([facility])     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[facility])     PCWP mean:26.0 PA mean: 24.00; denies any recent issues    Pulmonary hypertension ([facility])     ventricular diastolic function consistent with abnormal relaxation (stage I)    Pulmonary sarcoidosis ([facility])     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, f/u w/ PCP    Rectal bleeding     Rhabdomyolysis 05/09/2011    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 
TROPHS 18 11/10/2023 03:37 PM     CK:  Lab Results   Component Value Date/Time    CKTOTAL 89 05/17/2023 01:02 AM    CKTOTAL 64 08/23/2018 02:50 PM    CKTOTAL 67 08/24/2017 10:35 PM     FASTING LIPID PANEL:  Lab Results   Component Value Date/Time    CHOL 209 06/14/2023 03:55 AM    HDL 82 06/14/2023 03:55 AM    LDLCALC 113 06/14/2023 03:55 AM    TRIG 68 06/14/2023 03:55 AM     LIVER PROFILE:  Recent Labs     12/30/23  1337   AST 34*   ALT 16   LABALBU 3.4*     COVID19:   Recent Labs     12/30/23  1337   COVID19 Not Detected   A&P per Dr Garcia    Electronically signed by IVIS CARPENTER on 12/31/2023 at 8:10 AM

## 2024-01-03 NOTE — PLAN OF CARE
Patient's chart updated to reflect:      .    - HF care plan, HF education points and HF discharge instructions.  -Orders: 2 gram sodium diet, daily weights, I/O.  -PCP and cardiology follow up appointments to be scheduled within 7 days of hospital discharge.  -CHF education session will be provided to the patient prior to hospital discharge.    Astrid Bishop RN   Heart Failure Navigator

## 2024-01-04 VITALS
HEIGHT: 63 IN | BODY MASS INDEX: 22.19 KG/M2 | HEART RATE: 72 BPM | WEIGHT: 125.25 LBS | RESPIRATION RATE: 20 BRPM | OXYGEN SATURATION: 95 % | DIASTOLIC BLOOD PRESSURE: 82 MMHG | TEMPERATURE: 97.4 F | SYSTOLIC BLOOD PRESSURE: 109 MMHG

## 2024-01-04 PROBLEM — J44.1 COPD EXACERBATION (HCC): Status: RESOLVED | Noted: 2023-12-30 | Resolved: 2024-01-04

## 2024-01-04 PROBLEM — K62.3 RECTAL PROLAPSE: Status: ACTIVE | Noted: 2024-01-04

## 2024-01-04 LAB
ALBUMIN SERPL-MCNC: 3.1 G/DL (ref 3.5–5.2)
ALP SERPL-CCNC: 85 U/L (ref 35–104)
ALT SERPL-CCNC: 10 U/L (ref 0–32)
ANION GAP SERPL CALCULATED.3IONS-SCNC: 10 MMOL/L (ref 7–16)
AST SERPL-CCNC: 20 U/L (ref 0–31)
BASOPHILS # BLD: 0.03 K/UL (ref 0–0.2)
BASOPHILS NFR BLD: 1 % (ref 0–2)
BILIRUB SERPL-MCNC: 0.3 MG/DL (ref 0–1.2)
BUN SERPL-MCNC: 18 MG/DL (ref 6–23)
CALCIUM SERPL-MCNC: 9.4 MG/DL (ref 8.6–10.2)
CHLORIDE SERPL-SCNC: 101 MMOL/L (ref 98–107)
CO2 SERPL-SCNC: 28 MMOL/L (ref 22–29)
CREAT SERPL-MCNC: 1 MG/DL (ref 0.5–1)
EOSINOPHIL # BLD: 0.35 K/UL (ref 0.05–0.5)
EOSINOPHILS RELATIVE PERCENT: 7 % (ref 0–6)
ERYTHROCYTE [DISTWIDTH] IN BLOOD BY AUTOMATED COUNT: 14.3 % (ref 11.5–15)
GFR SERPL CREATININE-BSD FRML MDRD: 59 ML/MIN/1.73M2
GLUCOSE SERPL-MCNC: 81 MG/DL (ref 74–99)
HCT VFR BLD AUTO: 32.6 % (ref 34–48)
HGB BLD-MCNC: 9.9 G/DL (ref 11.5–15.5)
IMM GRANULOCYTES # BLD AUTO: <0.03 K/UL (ref 0–0.58)
IMM GRANULOCYTES NFR BLD: 0 % (ref 0–5)
LYMPHOCYTES NFR BLD: 0.77 K/UL (ref 1.5–4)
LYMPHOCYTES RELATIVE PERCENT: 16 % (ref 20–42)
MCH RBC QN AUTO: 25.7 PG (ref 26–35)
MCHC RBC AUTO-ENTMCNC: 30.4 G/DL (ref 32–34.5)
MCV RBC AUTO: 84.7 FL (ref 80–99.9)
MONOCYTES NFR BLD: 0.66 K/UL (ref 0.1–0.95)
MONOCYTES NFR BLD: 13 % (ref 2–12)
NEUTROPHILS NFR BLD: 63 % (ref 43–80)
NEUTS SEG NFR BLD: 3.13 K/UL (ref 1.8–7.3)
PLATELET CONFIRMATION: NORMAL
PLATELET, FLUORESCENCE: 145 K/UL (ref 130–450)
PMV BLD AUTO: 13.3 FL (ref 7–12)
POTASSIUM SERPL-SCNC: 4.6 MMOL/L (ref 3.5–5)
PROT SERPL-MCNC: 6.5 G/DL (ref 6.4–8.3)
RBC # BLD AUTO: 3.85 M/UL (ref 3.5–5.5)
SODIUM SERPL-SCNC: 139 MMOL/L (ref 132–146)
WBC OTHER # BLD: 5 K/UL (ref 4.5–11.5)

## 2024-01-04 PROCEDURE — 6360000002 HC RX W HCPCS

## 2024-01-04 PROCEDURE — 6370000000 HC RX 637 (ALT 250 FOR IP): Performed by: NURSE PRACTITIONER

## 2024-01-04 PROCEDURE — 6370000000 HC RX 637 (ALT 250 FOR IP)

## 2024-01-04 PROCEDURE — 6370000000 HC RX 637 (ALT 250 FOR IP): Performed by: STUDENT IN AN ORGANIZED HEALTH CARE EDUCATION/TRAINING PROGRAM

## 2024-01-04 PROCEDURE — 85025 COMPLETE CBC W/AUTO DIFF WBC: CPT

## 2024-01-04 PROCEDURE — 99238 HOSP IP/OBS DSCHRG MGMT 30/<: CPT | Performed by: FAMILY MEDICINE

## 2024-01-04 PROCEDURE — 36415 COLL VENOUS BLD VENIPUNCTURE: CPT

## 2024-01-04 PROCEDURE — 2700000000 HC OXYGEN THERAPY PER DAY

## 2024-01-04 PROCEDURE — 6370000000 HC RX 637 (ALT 250 FOR IP): Performed by: PHYSICIAN ASSISTANT

## 2024-01-04 PROCEDURE — 99231 SBSQ HOSP IP/OBS SF/LOW 25: CPT | Performed by: SURGERY

## 2024-01-04 PROCEDURE — 2580000003 HC RX 258

## 2024-01-04 PROCEDURE — 80053 COMPREHEN METABOLIC PANEL: CPT

## 2024-01-04 PROCEDURE — 94640 AIRWAY INHALATION TREATMENT: CPT

## 2024-01-04 PROCEDURE — 94660 CPAP INITIATION&MGMT: CPT

## 2024-01-04 RX ORDER — METOPROLOL SUCCINATE 50 MG/1
50 TABLET, EXTENDED RELEASE ORAL 2 TIMES DAILY
Qty: 30 TABLET | Refills: 3 | Status: SHIPPED | OUTPATIENT
Start: 2024-01-04

## 2024-01-04 RX ORDER — DIGOXIN 0.06 MG/1
62.5 TABLET ORAL DAILY
Qty: 30 TABLET | Refills: 3 | Status: SHIPPED | OUTPATIENT
Start: 2024-01-05

## 2024-01-04 RX ADMIN — FERROUS SULFATE TAB 325 MG (65 MG ELEMENTAL FE) 325 MG: 325 (65 FE) TAB at 11:31

## 2024-01-04 RX ADMIN — SENNOSIDES AND DOCUSATE SODIUM 2 TABLET: 50; 8.6 TABLET ORAL at 11:29

## 2024-01-04 RX ADMIN — IPRATROPIUM BROMIDE AND ALBUTEROL SULFATE 1 DOSE: 2.5; .5 SOLUTION RESPIRATORY (INHALATION) at 08:16

## 2024-01-04 RX ADMIN — APIXABAN 5 MG: 5 TABLET, FILM COATED ORAL at 11:30

## 2024-01-04 RX ADMIN — DIGOXIN 62.5 MCG: 125 TABLET ORAL at 11:28

## 2024-01-04 RX ADMIN — IPRATROPIUM BROMIDE AND ALBUTEROL SULFATE 1 DOSE: 2.5; .5 SOLUTION RESPIRATORY (INHALATION) at 12:02

## 2024-01-04 RX ADMIN — HYDROCORTISONE 15 MG: 5 TABLET ORAL at 11:29

## 2024-01-04 RX ADMIN — DESMOPRESSIN ACETATE 200 MCG: 0.1 TABLET ORAL at 11:30

## 2024-01-04 RX ADMIN — AMLODIPINE BESYLATE 5 MG: 5 TABLET ORAL at 11:28

## 2024-01-04 RX ADMIN — LEVETIRACETAM 500 MG: 500 TABLET, FILM COATED ORAL at 11:32

## 2024-01-04 RX ADMIN — LEVOTHYROXINE SODIUM 50 MCG: 0.05 TABLET ORAL at 11:30

## 2024-01-04 RX ADMIN — POTASSIUM CHLORIDE 20 MEQ: 1500 TABLET, EXTENDED RELEASE ORAL at 11:28

## 2024-01-04 RX ADMIN — SODIUM CHLORIDE, PRESERVATIVE FREE 10 ML: 5 INJECTION INTRAVENOUS at 11:27

## 2024-01-04 RX ADMIN — GUAIFENESIN 400 MG: 400 TABLET ORAL at 11:31

## 2024-01-04 RX ADMIN — ARFORMOTEROL TARTRATE 15 MCG: 15 SOLUTION RESPIRATORY (INHALATION) at 08:16

## 2024-01-04 RX ADMIN — DULOXETINE HYDROCHLORIDE 60 MG: 60 CAPSULE, DELAYED RELEASE ORAL at 11:30

## 2024-01-04 RX ADMIN — OMEGA-3-ACID ETHYL ESTERS 2 G: 1 CAPSULE, LIQUID FILLED ORAL at 11:28

## 2024-01-04 RX ADMIN — FUROSEMIDE 20 MG: 20 TABLET ORAL at 11:30

## 2024-01-04 RX ADMIN — GUAIFENESIN 400 MG: 400 TABLET ORAL at 15:22

## 2024-01-04 RX ADMIN — METOPROLOL SUCCINATE 50 MG: 50 TABLET, EXTENDED RELEASE ORAL at 11:30

## 2024-01-04 RX ADMIN — IPRATROPIUM BROMIDE AND ALBUTEROL SULFATE 1 DOSE: 2.5; .5 SOLUTION RESPIRATORY (INHALATION) at 16:14

## 2024-01-04 RX ADMIN — BUDESONIDE 500 MCG: 0.5 SUSPENSION RESPIRATORY (INHALATION) at 08:16

## 2024-01-04 RX ADMIN — ACETAMINOPHEN 650 MG: 325 TABLET ORAL at 11:38

## 2024-01-04 RX ADMIN — SILDENAFIL 10 MG: 20 TABLET ORAL at 15:23

## 2024-01-04 RX ADMIN — FAMOTIDINE 20 MG: 20 TABLET ORAL at 11:30

## 2024-01-04 RX ADMIN — SILDENAFIL 10 MG: 20 TABLET ORAL at 11:28

## 2024-01-04 RX ADMIN — MULTIVITAMIN TABLET 1 TABLET: TABLET at 11:31

## 2024-01-04 RX ADMIN — Medication 1000 UNITS: at 11:31

## 2024-01-04 ASSESSMENT — PAIN SCALES - GENERAL
PAINLEVEL_OUTOF10: 0
PAINLEVEL_OUTOF10: 5
PAINLEVEL_OUTOF10: 8

## 2024-01-04 ASSESSMENT — PAIN DESCRIPTION - DESCRIPTORS: DESCRIPTORS: ACHING;DISCOMFORT;NAGGING

## 2024-01-04 ASSESSMENT — PAIN DESCRIPTION - FREQUENCY: FREQUENCY: CONTINUOUS

## 2024-01-04 ASSESSMENT — PAIN - FUNCTIONAL ASSESSMENT: PAIN_FUNCTIONAL_ASSESSMENT: ACTIVITIES ARE NOT PREVENTED

## 2024-01-04 ASSESSMENT — PAIN DESCRIPTION - LOCATION: LOCATION: HEAD

## 2024-01-04 ASSESSMENT — PAIN DESCRIPTION - PAIN TYPE: TYPE: ACUTE PAIN

## 2024-01-04 ASSESSMENT — PAIN DESCRIPTION - ONSET: ONSET: ON-GOING

## 2024-01-04 NOTE — CARE COORDINATION
Spoke with family medicine, for possible discharge later today. They want pulse ox testing completed and clearance from pul. Will check with pul after pulse ox testing completed. Plan is home when released, spouse wants to [place]. [facility] following, will need resume homecare orders prior to discharge. Patient on 4L this am.     1245 Notified family medicine of pulse ox testing and also pul has cleared for discharge.     1419 [facility] notified of discharge and resume orders.     For questions I can be reached at [phone]. JIGAR Oseguera      
with the providers was provided to: Patient Representative   Patient Representative Name: Eric Rodriguez     The Patient and/or Patient Representative Agree with the Discharge Plan? Yes    JIGAR Oseguera  Case Management Department  Ph: [phone] Fax:

## 2024-01-04 NOTE — DISCHARGE SUMMARY
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    Altered mental status    BRBPR (bright red blood per rectum)    Severe pulmonary hypertension (HCC)    Pulmonary hypertension    Chronic kidney disease, stage III (moderate) (HCC)    Paroxysmal atrial fibrillation (HCC)    Mixed hyperlipidemia    Goals of care, counseling/discussion    Atrial fibrillation with RVR (HCC)    Chronic combined systolic and diastolic heart failure (HCC)    Chronic hypoxemic respiratory failure (HCC)    Mixed simple and mucopurulent chronic bronchitis (HCC)    Recurrent major depressive disorder, in partial remission (HCC)    Gait disturbance    Chest pain    Chronic, continuous use of opioids    PMB (postmenopausal bleeding)    Severe dysplasia of vagina    Epistaxis    Ventral hernia without obstruction or gangrene    Long term (current) use of systemic steroids    Nuclear senile cataract    Constipation    Abdominal pain    Small bowel obstruction (HCC)    Seizure (HCC)    Elevated troponin level    RVF (right ventricular failure) (HCC)    Moderate protein-calorie malnutrition (HCC)    Hypokalemia    Primary hypertension    Acute combined systolic and diastolic CHF, NYHA class 1 (HCC)    Acute on chronic diastolic CHF (congestive heart failure) (HCC)    Acute on chronic respiratory failure (HCC)    Shortness of breath    Palliative care encounter    Palliative care by specialist    Congestive heart failure, unspecified HF chronicity, unspecified heart failure

## 2024-01-04 NOTE — PROGRESS NOTES
Cecilio Donovan M.D.,[facility]  Shaheed Agudelo D.O., FBEATRICE., [facility]  Evans Agarwal M.D.  Abby Black M.D.   SARIKA Soto.O.        Daily Pulmonary Progress Note    Patient:  Rebekah Rodriguez 68 y.o. female MRN: [id number]     Date of Service: 1/3/2024      Synopsis     We are following patient for shortness of breath, extensive pulmonary history    \"CC\" shortness of breath    Code status: Full      Subjective      Patient was seen and examined.  Oxygen 4 L nasal cannula previous to baseline 5 L at home.  Started on Revatio, no complaint of headaches or facial flushing.  /77.  Working with therapy sitting up at the bedside.  General surgery consult for possible rectal prolapse.    Review of Systems:  Constitutional: Denies fever, weight loss, night sweats, and fatigue  Skin: Denies pigmentation, dark lesions, and rashes   HEENT: Denies hearing loss, tinnitus, ear drainage, epistaxis, sore throat, and hoarseness.  Cardiovascular: Denies palpitations, chest pain, and chest pressure.  Respiratory: Chronic dyspnea  Gastrointestinal: Denies nausea, vomiting, poor appetite, diarrhea, heartburn or reflux  Genitourinary: Denies dysuria, frequency, urgency or hematuria  Musculoskeletal: Denies myalgias, muscle weakness, and bone pain  Neurological: Denies dizziness, vertigo, headache, and focal weakness  Psychological: Denies anxiety and depression  Endocrine: Denies heat intolerance and cold intolerance  Hematopoietic/Lymphatic: Denies bleeding problems and blood transfusions    24-hour events:  None    Objective   OBJECTIVE:   /77   Pulse 66   Temp 98.2 °F (36.8 °C) (Oral)   Resp 17   Ht 1.6 m (5' 3\")   Wt 56.8 kg (125 lb 4 oz)   LMP  (LMP Unknown)   SpO2 95%   BMI 22.19 kg/m²   SpO2 Readings from Last 1 Encounters:   01/03/24 95%        I/O:    Intake/Output Summary (Last 24 hours) at 1/3/2024 1548  Last data filed at 1/3/2024 0500  Gross per 24 hour   Intake 120 ml   Output --   Net 
           Cecilio Donovan M.D.,[facility]  Shaheed Agudelo D.O., GAB., [facility]  Evans Agarwal M.D.  Abby Black M.D.   PEDRITO SotoO.        Daily Pulmonary Progress Note    Patient:  Rebekah Rodriguez 68 y.o. female MRN: [id number]     Date of Service: 1/2/2024      Synopsis     We are following patient for shortness of breath, extensive pulmonary history    \"CC\" shortness of breath    Code status: Full      Subjective      Patient was seen and examined.  Oxygen at 5 L nasal cannula.  She was following at [facility] for pulmonary hypertension.  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  In September 2023 she was started on sildenafil 10 mg p.o. 3 times daily-resumed today.  Plan as outpatient was to titrate up to 20 mg 3 times daily as tolerated.  She remains on chronic Cortef dosing for adrenal insufficiency.  She appears at baseline with her fluid status tolerating Lasix 20 mg p.o. daily.  She wants to go home soon.      Review of Systems:  Constitutional: Denies fever, weight loss, night sweats, and fatigue  Skin: Denies pigmentation, dark lesions, and rashes   HEENT: Denies hearing loss, tinnitus, ear drainage, epistaxis, sore throat, and hoarseness.  Cardiovascular: Denies palpitations, chest pain, and chest pressure.  Respiratory: Chronic dyspnea  Gastrointestinal: Denies nausea, vomiting, poor appetite, diarrhea, heartburn or reflux  Genitourinary: Denies dysuria, frequency, urgency or hematuria  Musculoskeletal: Denies myalgias, muscle weakness, and bone pain  Neurological: Denies dizziness, vertigo, headache, and focal weakness  Psychological: Denies anxiety and depression  Endocrine: Denies heat intolerance and cold intolerance  Hematopoietic/Lymphatic: Denies bleeding problems and blood transfusions    24-hour events:  None    Objective   OBJECTIVE:   BP (!) 150/98   Pulse 92   Temp 97.9 °F (36.6 °C) (Axillary)   Resp 22   Ht 1.6 m (5' 3\")   Wt 56.8 kg (125 lb 4 oz)   LMP  
   12/31/23 2110   NIV Type   NIV Started/Stopped On   Equipment Type v60   Mode CPAP   Mask Type Full face mask   Mask Size Medium   Assessment   Comfort Level Good   Using Accessory Muscles No   Mask Compliance Good   Skin Assessment Clean, dry, & intact   Settings/Measurements   PIP Observed 9 cm H20   CPAP/EPAP 8 cmH2O   Vt (Measured) 313 mL   FiO2  30 %   Minute Volume (L/min) 6.4 Liters   Mask Leak (lpm) 13 lpm   Patient's Home Machine No   Alarm Settings   Alarms On Y       
4 Eyes Skin Assessment     NAME:  Rebekah Rodriguez  YOB: 1955  MEDICAL RECORD NUMBER:  [id number]    The patient is being assessed for  Admission    I agree that at least one RN has performed a thorough Head to Toe Skin Assessment on the patient. ALL assessment sites listed below have been assessed.      Areas assessed by both nurses:    Head, Face, Ears, Shoulders, Back, Chest, Arms, Elbows, Hands, Sacrum. Buttock, Coccyx, Ischium, Legs. Feet and Heels, and Under Medical Devices         Does the Patient have a Wound? No noted wound(s)       Blas Prevention initiated by RN: Yes  Wound Care Orders initiated by RN: No    Pressure Injury (Stage 3,4, Unstageable, DTI, NWPT, and Complex wounds) if present, place Wound referral order by RN under : No    New Ostomies, if present place, Ostomy referral order under : No     Nurse 1 eSignature: Electronically signed by Karen Sandoval RN on 12/31/23 at 6:47 PM EST    **SHARE this note so that the co-signing nurse can place an eSignature**    Nurse 2 eSignature: Electronically signed by Kylie Park RN on 12/31/23 at 7:00 PM EST  
Cardiology paged to check for discharge and transition to PO dig. Electronically signed by Alison Stein RN on 1/2/2024 at 3:21 PM    
[facility] - Family Medicine Inpatient   Resident Progress Note    S:  Hospital day: 2    Brief Synopsis: Rebekah Rodriguez is a 68 y.o. female with a PMH of  CHF, A. Fib, Pulm HTN, Sarcoidosis, CKD, COPD on chronic 4 L oxygen, Hypothyroidism, and Diabetes Insipidus who presented to the ED with complaints of worsening shortness of breath. Endorses cough with sputum production, worsening fatigue, headaches and intermittent chest pains. Her amiodarone was recently decreased from 200 mg to 100 mg due to her most recent PFTs results and she was advised to follow-up with her pulmonologist at [facility], but she has not been able to do so. CT chest demonstrated a stable nodular opacity in inferior aspect of right middle lobe, Irregular opacities present throughout both lungs, Emphysematous changes, cardiomegaly and Re- demonstration of multiple compression fractures involving thoracic spine. Procalcitonin mildly elevated at 0.09, strep and legionella negative.     Overnight:  No overnight events  Patient was seen and examined this morning.    On CPAP.   Complaining of pressure-like headache, 8/10, intermittent. Last took Tylenol at 7pm yesterday with some relief. Also endorsing photophobia. Denies worst headache of life, visual disturbances, chest pain, SOB, dizziness, syncope or abnormal sensations.     Cont meds:    sodium chloride       Scheduled meds:    acetaminophen  1,000 mg Oral Once    amiodarone  100 mg Oral Daily    ipratropium 0.5 mg-albuterol 2.5 mg  1 Dose Inhalation Q4H WA RT    amLODIPine  5 mg Oral Daily    budesonide  0.5 mg Nebulization BID RT    arformoterol tartrate  15 mcg Nebulization BID RT    desmopressin  200 mcg Oral BID    DULoxetine  60 mg Oral Daily    apixaban  5 mg Oral BID    famotidine  20 mg Oral Daily    ferrous sulfate  325 mg Oral Daily with breakfast    furosemide  20 mg Oral Daily    hydrocortisone  5 mg Oral Nightly    hydrocortisone  15 mg Oral QAM    levETIRAcetam  500 mg Oral BID    
Date: 1/1/2024    Time: 8:21 PM    Patient Placed On BIPAP/CPAP/ Non-Invasive Ventilation?  Yes    If no must comment.  Facial area red/color change? No           If YES are Blister/Lesion present?No   If yes must notify nursing staff  BIPAP/CPAP skin barrier?  Yes    Skin barrier type:mepilexlite       Comments:        Lauro Jenkins RCP   
Dr. Luna notified of consult. Electronically signed by Alison Stein RN on 1/1/2024 at 12:35 PM    Dr. Roy notified of consult as she is known to his group. Electronically signed by Alsion Stein RN on 1/1/2024 at 12:40 PM    
[place] [facility]   ATTENDING PHYSICIAN PROGRESS NOTE     I have personally examined the patient, personally reviewed the record, and personally discussed the case with the resident. I have personally reviewed all relevant labs and imaging data. I am actively managing this patient's medications.  Please refer to the resident's note. I agree with the assessment and plan with the following corrections/additions. The following summarizes my clinical findings and independent assessment.     CC: rectal prolapse    Pt denies any further prolapse of her rectum.      Discussed follow-up as outpt--possible repeat colonoscopy.  Pt agreeable    Bebeto Meneses MD, FACS  1/4/2024  3:02 PM        
MRSA swab ordered per protocol to check colonization. Electronically signed by Alison Stein RN on 1/2/2024 at 10:50 AM    
[facility]  Family Medicine Attending    Hospital Course: 68 y.o. female with PMH of  CHF, A. Fib, Pulm HTN, Sarcoidosis, CKD, COPD on chronic 4 L oxygen, Hypothyroidism, and Diabetes Insipidus.  who presents to the ED with complaints of worsening shortness of breath, ?hx of chest pain. She was placed on her home CPAP & given solumedrol x1 Baseline O2 4-8L, admitted on 5L and concern for persistent dyspnea with chest pain.    S: NHUNG.  Again reports feeling well and she thinks she is ready to go home.     O: VS- Blood pressure 109/87, pulse 94, temperature 97.9 °F (36.6 °C), temperature source Temporal, resp. rate 20, height 1.6 m (5' 3\"), weight 56.8 kg (125 lb 4 oz), SpO2 96 %, not currently breastfeeding.  Exam is as noted by resident with the following changes, additions or corrections:  Gen: NAD, AAOX3  HEENT: NC/AT, EOMI / PERRL / ANICTERIC, No Facial Droop. CPAP on.   CV: NDPMI, Irreg Irreg, no MRG  RESP: no IWOB, symmetric chest rise,  reasonably ctab. no w/r.  ABD: Large abdominal hernia, NTTP.   EXT: LESLIE Equally grossly. Tr LE Edema. 2+ Radial Pulses.  SKIN: Warm / Dry.   Mood/ Affect: Pleasant & cooperative.      IMPRESSION:  1. Stable nodular opacity located in inferior aspect of the right middle lobe  measures 2.2 x 1 cm on axial image number 78, series: 301.  This lesion may  represent focal area of subsegmental atelectasis versus lung nodule.  2. Irregular opacities are present throughout both lungs notable in the lower  lobes which could indicate stable subsegmental atelectasis and scarring.  No  focal pneumonia or pleural effusion.  3. Emphysematous changes.  4. Cardiomegaly  5. Enlarged pulmonary outflow track suggestive of pulmonary arterial  hypertension.  Clinical correlation recommended.  6. Redemonstration of multiple compression fractures involving thoracic  spine.  MRI thoracic spine may be warranted for further evaluation.      Impressions:   Principal Problem:    COPD 
[facility]  Family Medicine Attending    Hospital Course: 68 y.o. female with PMH of  CHF, A. Fib, Pulm HTN, Sarcoidosis, CKD, COPD on chronic 4 L oxygen, Hypothyroidism, and Diabetes Insipidus.  who presents to the ED with complaints of worsening shortness of breath, ?hx of chest pain. She was placed on her home CPAP & given solumedrol x1 Baseline O2 4-8L, admitted on 5L and concern for persistent dyspnea with chest pain.    S: She reports feeling better today attributed to CPAP.     O: VS- Blood pressure (!) 156/93, pulse 79, temperature 97.6 °F (36.4 °C), resp. rate 18, SpO2 98 %, not currently breastfeeding.  Exam is as noted by resident with the following changes, additions or corrections:  Gen: NAD, AAOX3  HEENT: NC/AT, EOMI / PERRL / ANICTERIC, No Facial Droop. CPAP on.   CV: NDPMI, RRR no MRG  RESP: no IWOB, symmetric chest rise,  reasonably ctab. no w/r.  ABD: Large abdominal hernia, NTTP.   EXT: LESLIE Equally grossly. Tr LE Edema. 2+ Radial and DP Pulses.  SKIN: Warm / Dry.   Mood/ Affect: Pleasant & cooperative.      IMPRESSION:  1. Stable nodular opacity located in inferior aspect of the right middle lobe  measures 2.2 x 1 cm on axial image number 78, series: 301.  This lesion may  represent focal area of subsegmental atelectasis versus lung nodule.  2. Irregular opacities are present throughout both lungs notable in the lower  lobes which could indicate stable subsegmental atelectasis and scarring.  No  focal pneumonia or pleural effusion.  3. Emphysematous changes.  4. Cardiomegaly  5. Enlarged pulmonary outflow track suggestive of pulmonary arterial  hypertension.  Clinical correlation recommended.  6. Redemonstration of multiple compression fractures involving thoracic  spine.  MRI thoracic spine may be warranted for further evaluation.      Impressions:   Principal Problem:    COPD exacerbation (HCC)  Resolved Problems:    * No resolved hospital problems. *      Plan:       1) COPD 
OCCUPATIONAL THERAPY INITIAL EVALUATION    [facility]  [address], [place]      Date:1/3/2024                                                  Patient Name: Rebekah Rodriguez  MRN: [id number]  : 1955  Room: [address] [place] [address]    Evaluating OT: JAELYN Ramon, OTR/L  # [id number]    Referring Provider:  Bobby Swan MD   Specific Provider Orders:  \"OT Eval and Treat\"  23    Diagnosis: Dyspnea and respiratory abnormalities [R06.00, R06.89]  COPD exacerbation (HCC) [J44.1]    Pt was admitted w/ SOB, Cough, wearing CPAP day and night at home - on 2-4L at baseline    Pertinent Medical History:  Pt has a past medical history of A-fib (HCC), Abnormal mammogram, Acute on chronic respiratory failure (HCC), Anemia, Anemia due to chronic illness, Ankle fracture, left, 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), Backache, Benign hypertension, CHF (congestive heart failure) (HCC), Chronic back pain, Chronic kidney disease, Chronic pain disorder, Chronic, continuous use of opioids, Compression fracture of thoracic vertebra (HCC), COPD (chronic obstructive pulmonary disease) (HCC), Debility, Deformity of ankle and foot, acquired, Depression, Diabetes insipidus (HCC), Diverticulitis, Dry eyes, Encephalopathy acute, Gait disturbance, GERD (gastroesophageal reflux disease), Hemorrhoids, Hernia, History of blood transfusion, History of Clostridium difficile, Hyperlipidemia, Hyperthyroidism, Hypothyroidism, Ischemic colitis, enteritis, or enterocolitis (HCC), Long term (current) use of systemic steroids, Long-term current use of steroids, Lumbar disc herniation, Myalgia and myositis, unspecified, Nephrosclerosis, Nonunion of fracture, Osteoarthritis, PAF (paroxysmal atrial fibrillation) ([facility]), 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HCC), Pulmonary hypertension (HCC), Pulmonary sarcoidosis (HCC), Rectal bleeding, Rhabdomyolysis, Steroid-induced avascular 
Patient has prolapsed rectum.  resident notified. Electronically signed by Alison Stein RN on 1/3/2024 at 10:25 AM    
Patient not willing to come off the BIPAP to eat breakfast or take her morning medications.  She just states \"not now.\"  
Per pulmonology pt can d/c from their standpoint. They will follow up with her outpatient in about four weeks however her [facility] appointment is more urgent, pt educated on both elements.     Pt baseline home O2 level currently 4L NC, not tested on room air.   O2 sat on 4L NC while ambulating 93%.  O2 sat on 4L NC sitting upon recovery 96%.   
Physical Therapy    PT order received and medical chart reviewed 1/2. Pt declined PT evaluation stating she wants to receive meds and get cleaned up first. Will re-attempt as able. Thank you.    Robbie Christy, PT, DPT  [id number]       
Physical Therapy    PT order received and medical chart reviewed 1/4. Pt states she is discharging home today and declined PT evaluation at this time. Will re-attempt as necessary. Thank you.    Robbie Christy, PT, DPT  [id number]       
Pt non ambulatory at home. Pt pivots to chair with assist.   Modified pulse ox completed  Pulse ox sitting 97% on 6 L  Pulse ox standing( 2 assist) 84 % on 8 L.  Pulse ox resting recovery 97% on 6 L.  Pt was SOB several minutes once returned to bed.  
[facility] - Family Medicine Inpatient   Resident Progress Note    S:  Hospital day: 1    Brief Synopsis: Rebekah Rodriguez is a 68 y.o. female with a PMH of  CHF, A. Fib, Pulm HTN, Sarcoidosis, CKD, COPD on chronic 4 L oxygen, Hypothyroidism, and Diabetes Insipidus.  who presents to the ED with complaints of worsening shortness of breath.   Endorses cough with sputum production, worsening fatigue and headaches.   Also states that she has been experiencing ocassional chest pains, las one about 3 days ago  Her amiodarone was recently decreased from 200 mg to 100 mg due to her most recent PFTs results and she was advised to follow-up with her pulmonologist at [facility], but she has not been able to do so.     Overnight:   CT chest with contrast showing   stable nodular opacity in inferior aspect of right middle lobe.  Irregular opacities present throughout both lungs  Emphysematous changes.  Cardiomegaly.    Re demonstration of multiple compression fractures involving thoracic spine.  Procal slightly elevated 0.09  Patient was seen and examined this morning.  Stable, On 5 L nc. Says she feels her breathing is back to normal   Hungry, asking for breakfast ths morning. No additional concerns or complaints since admission.      Cont meds:    sodium chloride       Scheduled meds:    ipratropium 0.5 mg-albuterol 2.5 mg  1 Dose Inhalation Q4H WA RT    amiodarone  50 mg Oral Daily    amLODIPine  5 mg Oral Daily    budesonide  0.5 mg Nebulization BID RT    arformoterol tartrate  15 mcg Nebulization BID RT    desmopressin  200 mcg Oral BID    DULoxetine  60 mg Oral Daily    apixaban  5 mg Oral BID    famotidine  20 mg Oral Daily    ferrous sulfate  325 mg Oral Daily with breakfast    furosemide  20 mg Oral Daily    hydrocortisone  5 mg Oral Nightly    hydrocortisone  15 mg Oral QAM    levETIRAcetam  500 mg Oral BID    levothyroxine  50 mcg Oral Daily    metoprolol succinate  25 mg Oral BID    multivitamin  1 tablet Oral Daily    omega-3 
[facility]  Family Medicine Attending    Hospital Course: 68 y.o. female with PMH of  CHF, A. Fib, Pulm HTN, Sarcoidosis, CKD, COPD on chronic 4 L oxygen, Hypothyroidism, and Diabetes Insipidus.  who presents to the ED with complaints of worsening shortness of breath, ?hx of chest pain. She was placed on her home CPAP & given solumedrol x1 Baseline O2 4-8L, admitted on 5L and concern for persistent dyspnea with chest pain.    S: Today, no new symptoms or concerns.  States that her breathing is stable overall; asking when she can go home.      O: VS- Blood pressure 114/80, pulse 80, temperature 98.5 °F (36.9 °C), temperature source Oral, resp. rate 16, height 1.6 m (5' 3\"), weight 56.8 kg (125 lb 4 oz), SpO2 98 %, not currently breastfeeding.  Exam is as noted by resident with the following changes, additions or corrections:  Gen: NAD, resting comfortably, A&A  HEENT: NC/AT   CV: Irreg, normal rate   RESP: no IWOB, symmetric chest rise but decreased bilaterally, coarse; stable.  Currently 4L NC.     EXT: Trace bilateral LE edema, stable       IMPRESSION:  1. Stable nodular opacity located in inferior aspect of the right middle lobe  measures 2.2 x 1 cm on axial image number 78, series: 301.  This lesion may  represent focal area of subsegmental atelectasis versus lung nodule.  2. Irregular opacities are present throughout both lungs notable in the lower  lobes which could indicate stable subsegmental atelectasis and scarring.  No  focal pneumonia or pleural effusion.  3. Emphysematous changes.  4. Cardiomegaly  5. Enlarged pulmonary outflow track suggestive of pulmonary arterial  hypertension.  Clinical correlation recommended.  6. Redemonstration of multiple compression fractures involving thoracic  spine.  MRI thoracic spine may be warranted for further evaluation.      Impressions:   Principal Problem (Resolved):    COPD exacerbation (HCC)  Active Problems:    Permanent atrial fibrillation 
Unable to collect respiratory culture at this time as patient is not producing any sputum. Will reattempt at a later time.   
66    Estimated Daily Nutrient Needs:  Energy Requirements Based On: Formula  Weight Used for Energy Requirements: Current  Energy (kcal/day): MSJx1.2SF per CBW=   Weight Used for Protein Requirements: Current  Protein (g/day): 1.2-1.4gm/kg CBW= 70-80  Method Used for Fluid Requirements: Other (Comment)  Fluid (ml/day): per critical care mgmt    Nutrition Diagnosis:   Inadequate oral intake related to impaired respiratory function as evidenced by intake 26-50%, poor intake prior to admission    Nutrition Interventions:   Food and/or Nutrient Delivery: Continue Current Diet, Modify Oral Nutrition Supplement  Nutrition Education/Counseling: Education not indicated  Coordination of Nutrition Care: Continue to monitor while inpatient       Goals:     Goals: PO intake 75% or greater, by next RD assessment       Nutrition Monitoring and Evaluation:   Behavioral-Environmental Outcomes: None Identified  Food/Nutrient Intake Outcomes: Food and Nutrient Intake, Supplement Intake  Physical Signs/Symptoms Outcomes: Biochemical Data, Chewing or Swallowing, GI Status, Fluid Status or Edema, Nutrition Focused Physical Findings, Skin, Weight    Discharge Planning:    Continue Oral Nutrition Supplement     Ori Farmer RD, LD  Contact: ext [phone]    
fibrillation (HCC)  Resolved Problems:    * No resolved hospital problems. *      Plan:       1) COPD Exacerbation in setting of Chronic resp Failure, Sarcoidosis, Severe Pulm HTN - s/p solumedrol x1.CT w/o focal PNA & Procal low - did not start abx, improved sx . ?concern for worsening resp status 2/2 amiodarone. Pulm consult given result of recent PFT.  Pulm management appreciated.       2) PAF - Eliquis 5mg PO BID, Toprol XL 25mg bid, EP management appreciated, now on digoxin.    3) CP, HICKEY, Orthopnea - Noncardiac chest pain per Carido, no new ischemic workup planned for now.     4) DI, Hypothyroid, T2DM, Adrenal Insuff - currently hemodynamically stable w/ reassuring labs. Cancel endo consult and can consult them in future as needed.     5) Consider recheck DEXA and close outpatient follow up, for compression fractures noted on CT.  Consider further imaging.  Follow up on reported lung nodules seen on CT.      DVT Ppx: eliquis.     Planning for disposition in near future; currently at approximate baseline.       Attending Physician Statement  I have reviewed the chart and seen the patient with the resident(s).  I personally reviewed images, EKG's and similar tests, if present.  I personally reviewed and performed key elements of the history and exam.  I have reviewed and confirmed the Family Medicine admitting team resident history and exam with changes as indicated above.  I agree with the assessment, plan, and orders as documented by the  admitting team resident Dr Castillo.  Please refer to the resident progress note today and admission history and physical  for additional information.      Fatuma Lizama,         
Irregular opacities are present throughout both lungs notable in the lower   lobes which could indicate stable subsegmental atelectasis and scarring.  No   focal pneumonia or pleural effusion.   3. Emphysematous changes.   4. Cardiomegaly   5. Enlarged pulmonary outflow track suggestive of pulmonary arterial   hypertension.  Clinical correlation recommended.   6. Redemonstration of multiple compression fractures involving thoracic   spine.  MRI thoracic spine may be warranted for further evaluation.         XR CHEST PORTABLE   Final Result   1. Ovoid opacity identified at the right lung base. This could represent   partial eventration of the right hemidiaphragm. The presence of a lung   consolidation cannot be excluded. Noncontrast CT scan of the chest can be   considered for further evaluation.   2. Tiny nodular density seen in the right upper lung field. A lung nodule   needs to be excluded and noncontrast CT scan of the chest again can be   considered.   3. Chronic interstitial lung changes and COPD.   4. Probable left lower lung field atelectasis.             A/P:  Principal Problem:    COPD exacerbation (HCC)  Active Problems:    Permanent atrial fibrillation (HCC)  Resolved Problems:    * No resolved hospital problems. *    Acute on Chronic hypoxic respiratory failure on O2   COPD exacerbation  Pulmonary sarcoidosis  Severe pulmonary hypertension, RVSP 119 mmHg with mild RV systolic dysfunction   Hx of Pulmonary embolism   Broad differential.  Worsening shortness of breath could be amiodarone toxicity related versus worsening pulmonary hypertension versus cardiogenic/A-fib versus COPD exacerbation versus others.  At baseline home oxygen  Afebrile, no leukocytosis  Received Solumedrol 125mg in the ED. Hold on continuing steroids pending pulmonology recommendations.   Will hold off on starting antibiotics at this time.  Procal 0.09, mildly elevated.   Monitor Vital signs.   Resp culture pending  Non contrast CT 
additional information.      Fatuma Lizama, DO        
on O2   COPD exacerbation  Pulmonary sarcoidosis  Severe pulmonary hypertension, RVSP 119 mmHg with mild RV systolic dysfunction   Hx of Pulmonary embolism   Broad differential.  Worsening shortness of breath could be amiodarone toxicity related versus worsening pulmonary hypertension versus cardiogenic/A-fib versus COPD exacerbation versus others.  At baseline home oxygen  Afebrile, no leukocytosis  Received Solumedrol 125mg in the ED. Hold on continuing steroids pending pulmonology recommendations.   Will hold off on starting antibiotics at this time.  Procal 0.09, mildly elevated.   Monitor Vital signs.   Resp culture pending  Non contrast CT chest showing  stable nodular opacity in inferior aspect of right middle lobe  Irregular opacities present throughout both lungs  Emphysematous changes.  Cardiomegaly  Re demonstration of multiple compression fractures involving thoracic spine.  Pulmonology consulted - Appreciate recs   Last PFTs done on 12/21:   Overall suggestive of severe COPD/emphysema.  Possible restrictive component as well.  Recommend clinical correlation and pulmonology evaluation.  Mucinex 600 mg twice daily  Respiratory care evaluation  Home CPAP       Paroxysmal atrial fibrillation  CHFpEF  (EF 60%, on last echo)  Chest pains  Hypertension:  Was in Sinus rhythm last month per Cardio office visit  In afib this admission  Metoprolol increased to 50mg BID per EP  Amiodarone dcd per EP due to worsening PFTs   Digoxin added per cardiology - Transitioned to 62.5mg po  Continue Eliquis 5mg BID  Amlodipine 5 mg po daily   Continue Lasix 20 mg daily  Telemetry   Lidocaine patch - Chest pain likely MSK in nature      Headache  Likely tension-type  Ordered tylenol 1000mg x1 on 1/1/24  Continue to monitor   Given patient's hx of opioid dependence, caution use   Avoid NSAIDs given hx of CKD   Repeat Tylenol dose, consider Fentanyl 1 time dose if not alleviated     Diabetes insipidus  Hypothyroidism  Adrenal 
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  Backup rate 18 FiO2 40%  Follow chest x-ray 12/30/2023  Scheduled bronchodilators Brovana budesonide twice daily, DuoNebs every 4 hours while awake  Follows at [facility] for pulmonary hypertension.  Per their recommendations to re- start sildenafil 10 mg 3 times daily(resume 1/2/2024) and titrate up to goal of 20 mg 3 times daily as outpatient-Per [facility] recommendations.  Prior attempts unsuccessful by [facility] to reach  x 3 regarding patient education and follow-up for management of sildenafil.  Eliquis twice daily  Guaifenesin 3 times daily  Chronic Cortef dosing twice daily, DDAVP twice daily  GI prophylaxis  Follow-up routed to office. Also to follow with Dr Baron Barone [facility] for PAH mgmt.  Needs close follow-up upon discharge, importance of this was stressed to her today prior to discharge.  She is in agreement.  This plan of care was reviewed in collaboration with Dr. Black    Electronically signed by FRIDA Krishna CNP on 1/4/2024 at 3:13 PM     I personally saw, examined, and cared for the patient.  I spoke with bedside nursing, therapists and consultants.  The case was discussed in detail and plans for care were established. Review of CNP documentation was conducted and revisions were made as appropriate. I agree with the above documented exam, problem list and plan of care with the following 
of multiple compression fractures in the thoracic spine  Continue home Keppra      CKD   At baseline  Continue to monitor    Hx of Anemia of chronic disease   Had sigmoidoscopy with internal hemorrhoid banding in 2021 with Dr. Sandra  Previous EGD in 2016 with Dr. Sandra showed sliding hiatal hernia and GERD  Stable  Continue to monitor     Chronic pain syndrome and Anxiety  Cymbalta 60 mg daily      GERD   Pepcid 20 mg twice daily                PT/OT evaluation: consulted   DVT prophylaxis: Eliquis  Diet: Easy to chew  Code Full  Disposition: telemetry, pending improvement in respiratory status       Electronically signed by Izabella Ludwig DO on 1/3/2024 at 7:03 AM  This case was discussed with attending physician Dr. Lizama   
echocardiographic exam performed on 1/21/2011. RV is now dilated with systolic dysfunction.   9/19/2023 TTE with bubble @ [facility]: Left ventricular systolic function is normal. EF = 55 ± 5% (visual est.). The right ventricle is dilated. Right ventricular systolic function is moderately to severely decreased. Estimated right ventricular systolic pressure is 69 mmHg consistent with moderately severe pulmonary hypertension. Estimated right atrial pressure is 8 mmHg based on IVC assessment. Negative agitated saline study.  LV/RV function varies beat to beat due to atrial fibrillation.  Exam was compared with the prior  echocardiographic exam performed on 6/18/2023. Overall, similar RV dysfunction and RVSP   PFTs-12/15/2023, severe obstructive lung disease, restrictive lung disease, decreased diffusion capacity     Assessment:  Noncardiac chest pain  Paroxysmal atrial fibrillation currently in sinus rhythm>back in AF with reasonable rate control.   [id number] score-4 on Eliquis for anticoagulation  Severe COPD with bronchospasm and acute exacerbation   Severe pulmonary hypertension WHO group 2 3 and 4, RVSP 119 mmHg with mild RV dysfunction  Chronic HFpEF appears euvolemic  Pulmonary sarcoidosis without evidence of cardiac sarcoidosis based on cardiac MRI done in 2016  Hypothyroidism HRT  Hypertension well-controlled current blood pressure 134/93  History of PE  Diabetes insipidus on desmopressin  Hypothyroidism HRT  Seizure disorder on Keppra     Plan:   Non cardiac chest pain , no ischemic work up is planned at this time.  Dr. Andino's input from  EP service was noted and appreciated and agree with stopping Amiodarone due to worsening of pulmonary function tests.   Continue Toprol-XL 50  mg twice daily for rate control.  Will add digoxin 62.5 mcg for RV dysfunction and also for rate control.  Continue  Eliquis 5 mg po BID for anticoagulation.    Management of COPD with acute exacerbation as per primary

## 2024-01-05 ENCOUNTER — TELEPHONE (OUTPATIENT)
Dept: FAMILY MEDICINE CLINIC | Age: 69
End: 2024-01-05

## 2024-01-05 LAB
EKG ATRIAL RATE: 70 BPM
EKG Q-T INTERVAL: 348 MS
EKG QRS DURATION: 72 MS
EKG QTC CALCULATION (BAZETT): 441 MS
EKG R AXIS: 75 DEGREES
EKG T AXIS: 110 DEGREES
EKG VENTRICULAR RATE: 97 BPM

## 2024-01-05 NOTE — TELEPHONE ENCOUNTER
Care Transitions Initial Follow Up Call    Outreach made within 2 business days of discharge: Yes    Patient: Rebekah Rodriguez Patient : 1955   MRN: [id number]  Reason for Admission: There are no discharge diagnoses documented for the most recent discharge.  Discharge Date: 24       Spoke with: spouse    Discharge department/facility: [facility] [place]    TCM Interactive Patient Contact:  Was patient able to fill all prescriptions: Yes  Was patient instructed to bring all medications to the follow-up visit: Yes  Is patient taking all medications as directed in the discharge summary? Yes  Does patient understand their discharge instructions: Yes  Does patient have questions or concerns that need addressed prior to 7-14 day follow up office visit: no  Reminded of appt on 1-10-24    Scheduled appointment with PCP within 7-14 days    Follow Up  Future Appointments   Date Time Provider Department Center   2024 12:30 PM Edu Olivas MD [facility]   1/10/2024  1:00 PM Bobby Swan MD [facility]   2024  2:40 PM Efrain Roy, DO [facility]   3/12/2024  2:40 PM Joya Garcia MD [place] [facility]       Goran Squires RN

## 2024-01-10 ENCOUNTER — OFFICE VISIT (OUTPATIENT)
Dept: FAMILY MEDICINE CLINIC | Age: 69
End: 2024-01-10

## 2024-01-10 VITALS
BODY MASS INDEX: 22.15 KG/M2 | HEIGHT: 63 IN | TEMPERATURE: 96.4 F | HEART RATE: 64 BPM | SYSTOLIC BLOOD PRESSURE: 112 MMHG | RESPIRATION RATE: 16 BRPM | DIASTOLIC BLOOD PRESSURE: 73 MMHG | WEIGHT: 125 LBS

## 2024-01-10 DIAGNOSIS — Z09 HOSPITAL DISCHARGE FOLLOW-UP: ICD-10-CM

## 2024-01-10 DIAGNOSIS — E03.9 HYPOTHYROIDISM, UNSPECIFIED TYPE: Primary | ICD-10-CM

## 2024-01-10 NOTE — PROGRESS NOTES
S: 68 y.o. female presents today for:     Hospital follow-up for increased dyspnea after starting amiodarone. Did see EP in hospital. Increased toprol and added digoxin and sildenafil.   Now, states that she is ok. Stable on 4L O2. No CP, diaphoresis, SOB, palp, HA, visual issues.  Sees Endocrine for adrenal insufficiency, DM 2 and hypothyroidism. Missed her recent visit. Due for TSH.    O: VS: /73 (Site: Right Upper Arm, Position: Sitting, Cuff Size: Medium Adult)   Pulse 64   Temp (!) 96.4 °F (35.8 °C) (Temporal)   Resp 16   Ht 1.6 m (5' 3\")   Wt 56.7 kg (125 lb)   LMP  (LMP Unknown)   BMI 22.14 kg/m²   AAO/NAD, appropriate affect for mood  CV:  RRR, no murmur  Resp: CTAB    Impression/Plan:   1) HICKEY- improved  2) a fib- per EP/cardiologist  3) pulm HTN- pulm visit scheduled  4) AI/DM/hypothyroidism- check TSH     Attending Physician Statement  I have discussed the case, including pertinent history and exam findings with the resident. I also have seen the patient and performed key portions of the examination.  I agree with the documented assessment and plan.      Marisela Matthews,

## 2024-01-10 NOTE — PROGRESS NOTES
Post-Discharge Transitional Care  Follow Up      Rebekah Rodriguez   YOB: 1955    Date of Office Visit:  1/10/2024  Date of Hospital Admission: 12/30/23  Date of Hospital Discharge: 1/4/24  Risk of hospital readmission (high >=14%. Medium >=10%) :Readmission Risk Score: 34.2      Care management risk score Rising risk (score 2-5) and Complex Care (Scores >=6): No Risk Score On File     Non face to face  following discharge, date last encounter closed (first attempt may have been earlier): 01/05/2024    Call initiated 2 business days of discharge: Yes    ASSESSMENT/PLAN:   Hypothyroidism, unspecified type  -     TSH  Hospital discharge follow-up  Contact information for Dr. Barone which was provided.  Patient and  was encouraged to contact provider so that they can follow-up with him as previously instructed with pulmonology.  Patient has already appointment set up for pulmonology, general surgery, endocrinology, cardiology  Has stopped taking amiodarone as previously instructed in the hospital and being compliant with medications.    Medical Decision Making: moderate complexity  No follow-ups on file.           Subjective:   HPI:  Follow up of Hospital problems/diagnosis(es): Shortness of breath, COPD exacerbation    Inpatient course: Discharge summary reviewed- see chart.    Interval history/Current status: States that she has been doing better since discharge.  Reports that her breathing is stable.  Currently on her 4 L nasal cannula chronic.  Reports no significant cough, shortness of breath, wheezing since been discharged.    She has stopped taking her amiodarone as instructed at discharge.  She missed her appointment with endocrinology. She is due for repeat TSH today.  Has appointment in place with specialists.  Reports that they were not aware that she has to follow-up with [facility] Dr. Baron Barone per discharge summary and pulmonology notes.  Agreeable to start following with him again,

## 2024-01-11 LAB — TSH SERPL DL<=0.05 MIU/L-ACNC: 0.44 UIU/ML (ref 0.27–4.2)

## 2024-01-23 ENCOUNTER — HOSPITAL ENCOUNTER (EMERGENCY)
Age: 69
Discharge: HOME | End: 2024-01-23
Attending: EMERGENCY MEDICINE
Payer: MEDICARE

## 2024-01-23 ENCOUNTER — APPOINTMENT (OUTPATIENT)
Dept: CT IMAGING | Age: 69
End: 2024-01-23
Attending: EMERGENCY MEDICINE
Payer: MEDICARE

## 2024-01-23 VITALS
TEMPERATURE: 98 F | HEART RATE: 73 BPM | SYSTOLIC BLOOD PRESSURE: 140 MMHG | RESPIRATION RATE: 20 BRPM | OXYGEN SATURATION: 94 % | DIASTOLIC BLOOD PRESSURE: 83 MMHG

## 2024-01-23 DIAGNOSIS — K52.9 PROCTOCOLITIS: Primary | ICD-10-CM

## 2024-01-23 LAB
ALBUMIN SERPL-MCNC: 3.4 G/DL (ref 3.5–5.2)
ALP SERPL-CCNC: 103 U/L (ref 35–104)
ALT SERPL-CCNC: 9 U/L (ref 0–32)
ANION GAP SERPL CALCULATED.3IONS-SCNC: 7 MMOL/L (ref 7–16)
AST SERPL-CCNC: 20 U/L (ref 0–31)
BACTERIA URNS QL MICRO: ABNORMAL
BASOPHILS # BLD: 0.02 K/UL (ref 0–0.2)
BASOPHILS NFR BLD: 0 % (ref 0–2)
BILIRUB SERPL-MCNC: 0.3 MG/DL (ref 0–1.2)
BILIRUB UR QL STRIP: NEGATIVE
BUN SERPL-MCNC: 12 MG/DL (ref 6–23)
CALCIUM SERPL-MCNC: 9.2 MG/DL (ref 8.6–10.2)
CASTS #/AREA URNS LPF: ABNORMAL /LPF
CHLORIDE SERPL-SCNC: 100 MMOL/L (ref 98–107)
CLARITY UR: ABNORMAL
CO2 SERPL-SCNC: 31 MMOL/L (ref 22–29)
COLOR UR: YELLOW
CREAT SERPL-MCNC: 0.9 MG/DL (ref 0.5–1)
EOSINOPHIL # BLD: 0.4 K/UL (ref 0.05–0.5)
EOSINOPHILS RELATIVE PERCENT: 8 % (ref 0–6)
EPI CELLS #/AREA URNS HPF: ABNORMAL /HPF
ERYTHROCYTE [DISTWIDTH] IN BLOOD BY AUTOMATED COUNT: 14.4 % (ref 11.5–15)
GFR SERPL CREATININE-BSD FRML MDRD: >60 ML/MIN/1.73M2
GLUCOSE SERPL-MCNC: 114 MG/DL (ref 74–99)
GLUCOSE UR STRIP-MCNC: NEGATIVE MG/DL
HCT VFR BLD AUTO: 32 % (ref 34–48)
HGB BLD-MCNC: 9.6 G/DL (ref 11.5–15.5)
HGB UR QL STRIP.AUTO: NEGATIVE
IMM GRANULOCYTES # BLD AUTO: <0.03 K/UL (ref 0–0.58)
IMM GRANULOCYTES NFR BLD: 0 % (ref 0–5)
KETONES UR STRIP-MCNC: NEGATIVE MG/DL
LEUKOCYTE ESTERASE UR QL STRIP: NEGATIVE
LIPASE SERPL-CCNC: 26 U/L (ref 13–60)
LYMPHOCYTES NFR BLD: 0.76 K/UL (ref 1.5–4)
LYMPHOCYTES RELATIVE PERCENT: 14 % (ref 20–42)
MCH RBC QN AUTO: 24.9 PG (ref 26–35)
MCHC RBC AUTO-ENTMCNC: 30 G/DL (ref 32–34.5)
MCV RBC AUTO: 83.1 FL (ref 80–99.9)
MONOCYTES NFR BLD: 0.5 K/UL (ref 0.1–0.95)
MONOCYTES NFR BLD: 9 % (ref 2–12)
NEUTROPHILS NFR BLD: 68 % (ref 43–80)
NEUTS SEG NFR BLD: 3.65 K/UL (ref 1.8–7.3)
NITRITE UR QL STRIP: NEGATIVE
PH UR STRIP: 6 [PH] (ref 5–9)
PLATELET # BLD AUTO: 211 K/UL (ref 130–450)
PMV BLD AUTO: 10.5 FL (ref 7–12)
POTASSIUM SERPL-SCNC: 4.5 MMOL/L (ref 3.5–5)
PROT SERPL-MCNC: 6.6 G/DL (ref 6.4–8.3)
PROT UR STRIP-MCNC: ABNORMAL MG/DL
RBC # BLD AUTO: 3.85 M/UL (ref 3.5–5.5)
RBC #/AREA URNS HPF: ABNORMAL /HPF
SODIUM SERPL-SCNC: 138 MMOL/L (ref 132–146)
SP GR UR STRIP: 1.02 (ref 1–1.03)
UROBILINOGEN UR STRIP-ACNC: 0.2 EU/DL (ref 0–1)
WBC #/AREA URNS HPF: ABNORMAL /HPF
WBC OTHER # BLD: 5.4 K/UL (ref 4.5–11.5)

## 2024-01-23 PROCEDURE — 83690 ASSAY OF LIPASE: CPT

## 2024-01-23 PROCEDURE — 74177 CT ABD & PELVIS W/CONTRAST: CPT

## 2024-01-23 PROCEDURE — 6370000000 HC RX 637 (ALT 250 FOR IP): Performed by: EMERGENCY MEDICINE

## 2024-01-23 PROCEDURE — 6360000004 HC RX CONTRAST MEDICATION: Performed by: RADIOLOGY

## 2024-01-23 PROCEDURE — 6360000002 HC RX W HCPCS: Performed by: EMERGENCY MEDICINE

## 2024-01-23 PROCEDURE — 96374 THER/PROPH/DIAG INJ IV PUSH: CPT

## 2024-01-23 PROCEDURE — 99285 EMERGENCY DEPT VISIT HI MDM: CPT

## 2024-01-23 PROCEDURE — 81001 URINALYSIS AUTO W/SCOPE: CPT

## 2024-01-23 PROCEDURE — 2580000003 HC RX 258: Performed by: EMERGENCY MEDICINE

## 2024-01-23 PROCEDURE — 85025 COMPLETE CBC W/AUTO DIFF WBC: CPT

## 2024-01-23 PROCEDURE — 80053 COMPREHEN METABOLIC PANEL: CPT

## 2024-01-23 PROCEDURE — 96375 TX/PRO/DX INJ NEW DRUG ADDON: CPT

## 2024-01-23 RX ORDER — 0.9 % SODIUM CHLORIDE 0.9 %
1000 INTRAVENOUS SOLUTION INTRAVENOUS ONCE
Status: COMPLETED | OUTPATIENT
Start: 2024-01-23 | End: 2024-01-23

## 2024-01-23 RX ORDER — MORPHINE SULFATE 4 MG/ML
4 INJECTION, SOLUTION INTRAMUSCULAR; INTRAVENOUS ONCE
Status: COMPLETED | OUTPATIENT
Start: 2024-01-23 | End: 2024-01-23

## 2024-01-23 RX ORDER — HYDROCODONE BITARTRATE AND ACETAMINOPHEN 5; 325 MG/1; MG/1
2 TABLET ORAL ONCE
Status: COMPLETED | OUTPATIENT
Start: 2024-01-23 | End: 2024-01-23

## 2024-01-23 RX ORDER — HYDROCODONE BITARTRATE AND ACETAMINOPHEN 5; 325 MG/1; MG/1
1 TABLET ORAL EVERY 4 HOURS PRN
Qty: 30 TABLET | Refills: 0 | Status: ON HOLD | OUTPATIENT
Start: 2024-01-23 | End: 2024-01-28

## 2024-01-23 RX ORDER — AMOXICILLIN AND CLAVULANATE POTASSIUM 875; 125 MG/1; MG/1
1 TABLET, FILM COATED ORAL ONCE
Status: COMPLETED | OUTPATIENT
Start: 2024-01-23 | End: 2024-01-23

## 2024-01-23 RX ORDER — ONDANSETRON 2 MG/ML
4 INJECTION INTRAMUSCULAR; INTRAVENOUS ONCE
Status: COMPLETED | OUTPATIENT
Start: 2024-01-23 | End: 2024-01-23

## 2024-01-23 RX ORDER — AMOXICILLIN AND CLAVULANATE POTASSIUM 875; 125 MG/1; MG/1
1 TABLET, FILM COATED ORAL 3 TIMES DAILY
Qty: 20 TABLET | Refills: 0 | Status: SHIPPED | OUTPATIENT
Start: 2024-01-23 | End: 2024-01-26

## 2024-01-23 RX ADMIN — ONDANSETRON 4 MG: 2 INJECTION INTRAMUSCULAR; INTRAVENOUS at 02:15

## 2024-01-23 RX ADMIN — MORPHINE SULFATE 4 MG: 4 INJECTION, SOLUTION INTRAMUSCULAR; INTRAVENOUS at 02:15

## 2024-01-23 RX ADMIN — SODIUM CHLORIDE 1000 ML: 9 INJECTION, SOLUTION INTRAVENOUS at 02:17

## 2024-01-23 RX ADMIN — HYDROCODONE BITARTRATE AND ACETAMINOPHEN 2 TABLET: 5; 325 TABLET ORAL at 04:46

## 2024-01-23 RX ADMIN — AMOXICILLIN AND CLAVULANATE POTASSIUM 1 TABLET: 875; 125 TABLET, FILM COATED ORAL at 04:46

## 2024-01-23 RX ADMIN — IOPAMIDOL 75 ML: 755 INJECTION, SOLUTION INTRAVENOUS at 03:59

## 2024-01-23 NOTE — ED PROVIDER NOTES
Decision Making/Differential Diagnosis:    CC/HPI Summary, Social Determinants of health, Records Reviewed, DDx, testing done/not done, ED Course, Reassessment, disposition considerations/shared decision making with patient, consults, disposition:            Medical Decision Making  Amount and/or Complexity of Data Reviewed  Labs: ordered.  Radiology: ordered.    Risk  Prescription drug management.        This is a 68-year-old female presents to the ED for rectal pain.  Upon arrival to the ED patient's vital signs are stable.  On exam patient does have pain to palpation on rectal exam but no active bleeding.  No external hemorrhoids.  Patient undergo laboratory work up with a CBC CMP lipase urinalysis as well as CT abdomen pelvis.  Patient given IV fluids IV Zofran and IV morphine for supportive treatment.  Patient's laboratory workup shows a normal CBC except for an H&H 9.6/32.0 which is around the patient's baseline.  Chemistry showed bicarb 31 glucose 114.  Urinary studies showed no signs of infection lipase normal.  CT M pelvis shows proctocolitis.  Other incidental findings noted and patient made aware of incidental findings.  Patient given Augmentin here in the emergency department.  Patient has a active bleeding on exam.  Patient stable for outpatient management as patient has no signs of sepsis.  Patient be placed on antibiotics with close PCP follow-up in 48 hours.  Return precautions given.  Patient agrees with plan.    CONSULTS: (Who and What was discussed)  None        I am the Primary Clinician of Record.    FINAL IMPRESSION      1. Proctocolitis          DISPOSITION/PLAN     DISPOSITION Decision To Discharge 01/23/2024 04:42:03 AM      PATIENT REFERRED TO:  Lauro Ellington MD  [address] [facility] [address] [phone]    Schedule an appointment as soon as possible for a visit in 2 days        DISCHARGE MEDICATIONS:  New Prescriptions    AMOXICILLIN-CLAVULANATE (AUGMENTIN) 875-125 MG PER TABLET

## 2024-01-26 ENCOUNTER — HOSPITAL ENCOUNTER (INPATIENT)
Age: 69
LOS: 10 days | Discharge: HOME HEALTH | End: 2024-02-05
Attending: EMERGENCY MEDICINE | Admitting: FAMILY MEDICINE
Payer: MEDICARE

## 2024-01-26 ENCOUNTER — APPOINTMENT (OUTPATIENT)
Dept: GENERAL RADIOLOGY | Age: 69
End: 2024-01-26
Payer: MEDICARE

## 2024-01-26 DIAGNOSIS — I50.9 CONGESTIVE HEART FAILURE, UNSPECIFIED HF CHRONICITY, UNSPECIFIED HEART FAILURE TYPE (HCC): ICD-10-CM

## 2024-01-26 DIAGNOSIS — K62.5 RECTAL BLEEDING: Primary | ICD-10-CM

## 2024-01-26 DIAGNOSIS — K62.3 RECTAL PROLAPSE: ICD-10-CM

## 2024-01-26 LAB
ALBUMIN SERPL-MCNC: 3.5 G/DL (ref 3.5–5.2)
ALP SERPL-CCNC: 108 U/L (ref 35–104)
ALT SERPL-CCNC: 16 U/L (ref 0–32)
ANION GAP SERPL CALCULATED.3IONS-SCNC: 7 MMOL/L (ref 7–16)
AST SERPL-CCNC: 47 U/L (ref 0–31)
BACTERIA URNS QL MICRO: ABNORMAL
BASOPHILS # BLD: 0.02 K/UL (ref 0–0.2)
BASOPHILS NFR BLD: 0 % (ref 0–2)
BILIRUB SERPL-MCNC: 0.3 MG/DL (ref 0–1.2)
BILIRUB UR QL STRIP: NEGATIVE
BNP SERPL-MCNC: 6464 PG/ML (ref 0–125)
BUN SERPL-MCNC: 10 MG/DL (ref 6–23)
CALCIUM SERPL-MCNC: 9.3 MG/DL (ref 8.6–10.2)
CASTS #/AREA URNS LPF: ABNORMAL /LPF
CHLORIDE SERPL-SCNC: 101 MMOL/L (ref 98–107)
CLARITY UR: CLEAR
CO2 SERPL-SCNC: 31 MMOL/L (ref 22–29)
COLOR UR: YELLOW
CREAT SERPL-MCNC: 0.9 MG/DL (ref 0.5–1)
CRYSTALS URNS MICRO: ABNORMAL /HPF
DATE LAST DOSE: ABNORMAL
DIGOXIN DOSE TIME: ABNORMAL
DIGOXIN DOSE: ABNORMAL MG
DIGOXIN SERPL-MCNC: 0.7 NG/ML (ref 0.8–2)
EKG ATRIAL RATE: 85 BPM
EKG Q-T INTERVAL: 450 MS
EKG QRS DURATION: 80 MS
EKG QTC CALCULATION (BAZETT): 499 MS
EKG R AXIS: 89 DEGREES
EKG T AXIS: -119 DEGREES
EKG VENTRICULAR RATE: 74 BPM
EOSINOPHIL # BLD: 0.22 K/UL (ref 0.05–0.5)
EOSINOPHILS RELATIVE PERCENT: 4 % (ref 0–6)
ERYTHROCYTE [DISTWIDTH] IN BLOOD BY AUTOMATED COUNT: 15.1 % (ref 11.5–15)
GFR SERPL CREATININE-BSD FRML MDRD: >60 ML/MIN/1.73M2
GLUCOSE SERPL-MCNC: 95 MG/DL (ref 74–99)
GLUCOSE UR STRIP-MCNC: NEGATIVE MG/DL
HCT VFR BLD AUTO: 32.5 % (ref 34–48)
HGB BLD-MCNC: 9.9 G/DL (ref 11.5–15.5)
HGB UR QL STRIP.AUTO: NEGATIVE
IMM GRANULOCYTES # BLD AUTO: <0.03 K/UL (ref 0–0.58)
IMM GRANULOCYTES NFR BLD: 0 % (ref 0–5)
KETONES UR STRIP-MCNC: ABNORMAL MG/DL
LACTATE BLDV-SCNC: 1.4 MMOL/L (ref 0.5–2.2)
LEUKOCYTE ESTERASE UR QL STRIP: NEGATIVE
LYMPHOCYTES NFR BLD: 0.66 K/UL (ref 1.5–4)
LYMPHOCYTES RELATIVE PERCENT: 13 % (ref 20–42)
MCH RBC QN AUTO: 25.3 PG (ref 26–35)
MCHC RBC AUTO-ENTMCNC: 30.5 G/DL (ref 32–34.5)
MCV RBC AUTO: 82.9 FL (ref 80–99.9)
MONOCYTES NFR BLD: 0.49 K/UL (ref 0.1–0.95)
MONOCYTES NFR BLD: 10 % (ref 2–12)
NEUTROPHILS NFR BLD: 72 % (ref 43–80)
NEUTS SEG NFR BLD: 3.67 K/UL (ref 1.8–7.3)
NITRITE UR QL STRIP: NEGATIVE
PH UR STRIP: 6 [PH] (ref 5–9)
PLATELET # BLD AUTO: 204 K/UL (ref 130–450)
PMV BLD AUTO: 10.6 FL (ref 7–12)
POTASSIUM SERPL-SCNC: 4 MMOL/L (ref 3.5–5)
POTASSIUM SERPL-SCNC: 5.7 MMOL/L (ref 3.5–5)
PROT SERPL-MCNC: 7 G/DL (ref 6.4–8.3)
PROT UR STRIP-MCNC: 30 MG/DL
RBC # BLD AUTO: 3.92 M/UL (ref 3.5–5.5)
RBC #/AREA URNS HPF: ABNORMAL /HPF
SODIUM SERPL-SCNC: 139 MMOL/L (ref 132–146)
SP GR UR STRIP: 1.02 (ref 1–1.03)
TROPONIN I SERPL HS-MCNC: 19 NG/L (ref 0–9)
UROBILINOGEN UR STRIP-ACNC: 0.2 EU/DL (ref 0–1)
WBC #/AREA URNS HPF: ABNORMAL /HPF
WBC OTHER # BLD: 5.1 K/UL (ref 4.5–11.5)

## 2024-01-26 PROCEDURE — 6360000002 HC RX W HCPCS: Performed by: EMERGENCY MEDICINE

## 2024-01-26 PROCEDURE — 84132 ASSAY OF SERUM POTASSIUM: CPT

## 2024-01-26 PROCEDURE — 80162 ASSAY OF DIGOXIN TOTAL: CPT

## 2024-01-26 PROCEDURE — 99284 EMERGENCY DEPT VISIT MOD MDM: CPT | Performed by: SURGERY

## 2024-01-26 PROCEDURE — 71045 X-RAY EXAM CHEST 1 VIEW: CPT

## 2024-01-26 PROCEDURE — 93010 ELECTROCARDIOGRAM REPORT: CPT | Performed by: INTERNAL MEDICINE

## 2024-01-26 PROCEDURE — 99285 EMERGENCY DEPT VISIT HI MDM: CPT

## 2024-01-26 PROCEDURE — 2060000000 HC ICU INTERMEDIATE R&B

## 2024-01-26 PROCEDURE — 81001 URINALYSIS AUTO W/SCOPE: CPT

## 2024-01-26 PROCEDURE — 83880 ASSAY OF NATRIURETIC PEPTIDE: CPT

## 2024-01-26 PROCEDURE — 80053 COMPREHEN METABOLIC PANEL: CPT

## 2024-01-26 PROCEDURE — 84484 ASSAY OF TROPONIN QUANT: CPT

## 2024-01-26 PROCEDURE — 6370000000 HC RX 637 (ALT 250 FOR IP)

## 2024-01-26 PROCEDURE — 93005 ELECTROCARDIOGRAM TRACING: CPT | Performed by: EMERGENCY MEDICINE

## 2024-01-26 PROCEDURE — 2580000003 HC RX 258

## 2024-01-26 PROCEDURE — 85025 COMPLETE CBC W/AUTO DIFF WBC: CPT

## 2024-01-26 PROCEDURE — 96374 THER/PROPH/DIAG INJ IV PUSH: CPT

## 2024-01-26 PROCEDURE — 83605 ASSAY OF LACTIC ACID: CPT

## 2024-01-26 PROCEDURE — 99222 1ST HOSP IP/OBS MODERATE 55: CPT | Performed by: FAMILY MEDICINE

## 2024-01-26 PROCEDURE — 96375 TX/PRO/DX INJ NEW DRUG ADDON: CPT

## 2024-01-26 RX ORDER — FERROUS SULFATE 325(65) MG
325 TABLET ORAL 2 TIMES DAILY
COMMUNITY

## 2024-01-26 RX ORDER — BUDESONIDE 0.5 MG/2ML
1 INHALANT ORAL 2 TIMES DAILY
COMMUNITY

## 2024-01-26 RX ORDER — POTASSIUM CHLORIDE 20 MEQ/1
20 TABLET, EXTENDED RELEASE ORAL DAILY
Status: DISCONTINUED | OUTPATIENT
Start: 2024-01-26 | End: 2024-02-05 | Stop reason: HOSPADM

## 2024-01-26 RX ORDER — BENZOCAINE/MENTHOL 6 MG-10 MG
LOZENGE MUCOUS MEMBRANE 2 TIMES DAILY
Status: DISCONTINUED | OUTPATIENT
Start: 2024-01-26 | End: 2024-02-05 | Stop reason: HOSPADM

## 2024-01-26 RX ORDER — DESMOPRESSIN ACETATE 0.1 MG/1
200 TABLET ORAL 2 TIMES DAILY
Status: DISCONTINUED | OUTPATIENT
Start: 2024-01-26 | End: 2024-02-05 | Stop reason: HOSPADM

## 2024-01-26 RX ORDER — SODIUM CHLORIDE 9 MG/ML
INJECTION, SOLUTION INTRAVENOUS PRN
Status: DISCONTINUED | OUTPATIENT
Start: 2024-01-26 | End: 2024-02-05 | Stop reason: HOSPADM

## 2024-01-26 RX ORDER — FUROSEMIDE 10 MG/ML
20 INJECTION INTRAMUSCULAR; INTRAVENOUS ONCE
Status: COMPLETED | OUTPATIENT
Start: 2024-01-26 | End: 2024-01-26

## 2024-01-26 RX ORDER — DULOXETIN HYDROCHLORIDE 60 MG/1
60 CAPSULE, DELAYED RELEASE ORAL DAILY
Status: DISCONTINUED | OUTPATIENT
Start: 2024-01-27 | End: 2024-02-05 | Stop reason: HOSPADM

## 2024-01-26 RX ORDER — MORPHINE SULFATE 4 MG/ML
4 INJECTION, SOLUTION INTRAMUSCULAR; INTRAVENOUS
Status: DISCONTINUED | OUTPATIENT
Start: 2024-01-26 | End: 2024-01-27

## 2024-01-26 RX ORDER — POTASSIUM CHLORIDE 20 MEQ/1
20 TABLET, EXTENDED RELEASE ORAL DAILY
COMMUNITY
End: 2024-02-12 | Stop reason: SDUPTHER

## 2024-01-26 RX ORDER — FUROSEMIDE 20 MG/1
20 TABLET ORAL DAILY
Status: DISCONTINUED | OUTPATIENT
Start: 2024-01-27 | End: 2024-02-05 | Stop reason: HOSPADM

## 2024-01-26 RX ORDER — HYDROCORTISONE 5 MG/1
15 TABLET ORAL EVERY MORNING
Status: DISCONTINUED | OUTPATIENT
Start: 2024-01-27 | End: 2024-01-29

## 2024-01-26 RX ORDER — ACETAMINOPHEN 325 MG/1
650 TABLET ORAL EVERY 6 HOURS PRN
Status: DISCONTINUED | OUTPATIENT
Start: 2024-01-26 | End: 2024-02-05 | Stop reason: HOSPADM

## 2024-01-26 RX ORDER — ONDANSETRON 2 MG/ML
4 INJECTION INTRAMUSCULAR; INTRAVENOUS EVERY 6 HOURS PRN
Status: DISCONTINUED | OUTPATIENT
Start: 2024-01-26 | End: 2024-02-05 | Stop reason: HOSPADM

## 2024-01-26 RX ORDER — SODIUM CHLORIDE 0.9 % (FLUSH) 0.9 %
5-40 SYRINGE (ML) INJECTION PRN
Status: DISCONTINUED | OUTPATIENT
Start: 2024-01-26 | End: 2024-02-05 | Stop reason: HOSPADM

## 2024-01-26 RX ORDER — MORPHINE SULFATE 2 MG/ML
2 INJECTION, SOLUTION INTRAMUSCULAR; INTRAVENOUS
Status: DISCONTINUED | OUTPATIENT
Start: 2024-01-26 | End: 2024-01-27

## 2024-01-26 RX ORDER — METOPROLOL SUCCINATE 50 MG/1
50 TABLET, EXTENDED RELEASE ORAL 2 TIMES DAILY
Status: DISCONTINUED | OUTPATIENT
Start: 2024-01-26 | End: 2024-02-05 | Stop reason: HOSPADM

## 2024-01-26 RX ORDER — MORPHINE SULFATE 2 MG/ML
2 INJECTION, SOLUTION INTRAMUSCULAR; INTRAVENOUS ONCE
Status: COMPLETED | OUTPATIENT
Start: 2024-01-26 | End: 2024-01-26

## 2024-01-26 RX ORDER — LEVOTHYROXINE SODIUM 0.05 MG/1
50 TABLET ORAL DAILY
Status: DISCONTINUED | OUTPATIENT
Start: 2024-01-27 | End: 2024-02-05 | Stop reason: HOSPADM

## 2024-01-26 RX ORDER — HYDROCODONE BITARTRATE AND ACETAMINOPHEN 5; 325 MG/1; MG/1
1 TABLET ORAL EVERY 6 HOURS PRN
Status: DISCONTINUED | OUTPATIENT
Start: 2024-01-26 | End: 2024-01-27

## 2024-01-26 RX ORDER — GUAIFENESIN 400 MG/1
400 TABLET ORAL 4 TIMES DAILY PRN
COMMUNITY

## 2024-01-26 RX ORDER — ONDANSETRON 2 MG/ML
4 INJECTION INTRAMUSCULAR; INTRAVENOUS EVERY 6 HOURS PRN
Status: DISCONTINUED | OUTPATIENT
Start: 2024-01-26 | End: 2024-01-26 | Stop reason: SDUPTHER

## 2024-01-26 RX ORDER — HYDROCORTISONE 5 MG/1
5 TABLET ORAL NIGHTLY
Status: DISCONTINUED | OUTPATIENT
Start: 2024-01-26 | End: 2024-01-29

## 2024-01-26 RX ORDER — ALBUTEROL SULFATE 2.5 MG/3ML
2.5 SOLUTION RESPIRATORY (INHALATION) EVERY 6 HOURS PRN
Status: DISCONTINUED | OUTPATIENT
Start: 2024-01-26 | End: 2024-02-05 | Stop reason: HOSPADM

## 2024-01-26 RX ORDER — BUDESONIDE 0.5 MG/2ML
500 INHALANT ORAL 2 TIMES DAILY
Status: DISCONTINUED | OUTPATIENT
Start: 2024-01-26 | End: 2024-02-05 | Stop reason: HOSPADM

## 2024-01-26 RX ORDER — ARFORMOTEROL TARTRATE 15 UG/2ML
1 SOLUTION RESPIRATORY (INHALATION)
COMMUNITY

## 2024-01-26 RX ORDER — LEVETIRACETAM 500 MG/1
500 TABLET ORAL 2 TIMES DAILY
Status: DISCONTINUED | OUTPATIENT
Start: 2024-01-26 | End: 2024-02-05 | Stop reason: HOSPADM

## 2024-01-26 RX ORDER — POLYETHYLENE GLYCOL 3350 17 G/17G
17 POWDER, FOR SOLUTION ORAL DAILY PRN
Status: DISCONTINUED | OUTPATIENT
Start: 2024-01-26 | End: 2024-02-03

## 2024-01-26 RX ORDER — VITAMIN B COMPLEX
1000 TABLET ORAL DAILY
Status: DISCONTINUED | OUTPATIENT
Start: 2024-01-26 | End: 2024-02-05 | Stop reason: HOSPADM

## 2024-01-26 RX ORDER — ARFORMOTEROL TARTRATE 15 UG/2ML
15 SOLUTION RESPIRATORY (INHALATION)
Status: DISCONTINUED | OUTPATIENT
Start: 2024-01-26 | End: 2024-02-05 | Stop reason: HOSPADM

## 2024-01-26 RX ORDER — FAMOTIDINE 20 MG/1
20 TABLET, FILM COATED ORAL DAILY
Status: DISCONTINUED | OUTPATIENT
Start: 2024-01-26 | End: 2024-02-05 | Stop reason: HOSPADM

## 2024-01-26 RX ORDER — AMLODIPINE BESYLATE 5 MG/1
5 TABLET ORAL DAILY
Status: DISCONTINUED | OUTPATIENT
Start: 2024-01-26 | End: 2024-02-04

## 2024-01-26 RX ORDER — GUAIFENESIN 400 MG/1
400 TABLET ORAL 4 TIMES DAILY PRN
Status: DISCONTINUED | OUTPATIENT
Start: 2024-01-26 | End: 2024-02-05 | Stop reason: HOSPADM

## 2024-01-26 RX ORDER — SILDENAFIL CITRATE 20 MG/1
10 TABLET ORAL 3 TIMES DAILY
Status: DISCONTINUED | OUTPATIENT
Start: 2024-01-26 | End: 2024-02-05 | Stop reason: HOSPADM

## 2024-01-26 RX ORDER — DIGOXIN 125 MCG
62.5 TABLET ORAL DAILY
COMMUNITY

## 2024-01-26 RX ORDER — SODIUM CHLORIDE 0.9 % (FLUSH) 0.9 %
5-40 SYRINGE (ML) INJECTION EVERY 12 HOURS SCHEDULED
Status: DISCONTINUED | OUTPATIENT
Start: 2024-01-26 | End: 2024-02-05 | Stop reason: HOSPADM

## 2024-01-26 RX ORDER — AMOXICILLIN AND CLAVULANATE POTASSIUM 875; 125 MG/1; MG/1
1 TABLET, FILM COATED ORAL 3 TIMES DAILY
Status: ON HOLD | COMMUNITY
Start: 2024-01-23 | End: 2024-02-05 | Stop reason: HOSPADM

## 2024-01-26 RX ORDER — ONDANSETRON 4 MG/1
4 TABLET, ORALLY DISINTEGRATING ORAL EVERY 8 HOURS PRN
Status: DISCONTINUED | OUTPATIENT
Start: 2024-01-26 | End: 2024-02-05 | Stop reason: HOSPADM

## 2024-01-26 RX ORDER — ACETAMINOPHEN 650 MG/1
650 SUPPOSITORY RECTAL EVERY 6 HOURS PRN
Status: DISCONTINUED | OUTPATIENT
Start: 2024-01-26 | End: 2024-02-02

## 2024-01-26 RX ORDER — DIGOXIN 125 MCG
62.5 TABLET ORAL DAILY
Status: DISCONTINUED | OUTPATIENT
Start: 2024-01-27 | End: 2024-02-05 | Stop reason: HOSPADM

## 2024-01-26 RX ADMIN — DESMOPRESSIN ACETATE 200 MCG: 0.1 TABLET ORAL at 21:57

## 2024-01-26 RX ADMIN — POLYVINYL ALCOHOL, POVIDONE 1 DROP: 14; 6 SOLUTION/ DROPS OPHTHALMIC at 21:57

## 2024-01-26 RX ADMIN — HYDROCORTISONE 5 MG: 5 TABLET ORAL at 21:56

## 2024-01-26 RX ADMIN — LEVETIRACETAM 500 MG: 500 TABLET, FILM COATED ORAL at 21:56

## 2024-01-26 RX ADMIN — METOPROLOL SUCCINATE 50 MG: 50 TABLET, EXTENDED RELEASE ORAL at 21:56

## 2024-01-26 RX ADMIN — MORPHINE SULFATE 2 MG: 2 INJECTION, SOLUTION INTRAMUSCULAR; INTRAVENOUS at 13:46

## 2024-01-26 RX ADMIN — MORPHINE SULFATE 2 MG: 2 INJECTION, SOLUTION INTRAMUSCULAR; INTRAVENOUS at 06:24

## 2024-01-26 RX ADMIN — Medication 10 ML: at 21:57

## 2024-01-26 RX ADMIN — SILDENAFIL 10 MG: 20 TABLET, FILM COATED ORAL at 21:56

## 2024-01-26 RX ADMIN — HYDROCORTISONE: 1 CREAM TOPICAL at 21:57

## 2024-01-26 RX ADMIN — MORPHINE SULFATE 4 MG: 4 INJECTION, SOLUTION INTRAMUSCULAR; INTRAVENOUS at 20:59

## 2024-01-26 RX ADMIN — ONDANSETRON 4 MG: 2 INJECTION INTRAMUSCULAR; INTRAVENOUS at 06:24

## 2024-01-26 RX ADMIN — FUROSEMIDE 20 MG: 10 INJECTION, SOLUTION INTRAVENOUS at 05:06

## 2024-01-26 ASSESSMENT — PAIN DESCRIPTION - LOCATION
LOCATION: RECTUM
LOCATION: ABDOMEN

## 2024-01-26 ASSESSMENT — PAIN SCALES - GENERAL
PAINLEVEL_OUTOF10: 10
PAINLEVEL_OUTOF10: 10

## 2024-01-26 NOTE — PROGRESS NOTES
[facility]  Family Medicine Attending    S: 68 y.o. female with PMH sarcoidosis, afib on eliquis, pulmonary hypertension, diabetes insipidus, hypothyroidism, hypertension, CKD stage 3a, and combined systolic/diastolic heart failure who presented with rectal pain and BRBPR.  Her hemoglobin is noted to be 9.9.  FOBT+.  Gen surg consulted.    O: VS- Blood pressure (!) 176/105, pulse 93, temperature 98 °F (36.7 °C), resp. rate 16, SpO2 97 %, not currently breastfeeding.  Exam is as noted by resident with the following changes, additions or corrections:  Rectal exam:  normal sphincter tone, no masses felt, external hemorrhoids noted without any thrombosis, no stool in the rectal vault; no rectal prolapse; no pain during exam; small amount of gross blood on gloved finger post KIKE.      CT abdomen pelvis with IV contrast  FINDINGS:  Lower Chest: Pulmonary interstitial architectural distortion suggesting COPD.  Cardiomegaly.  No infiltrate or effusion.  Small fatty right posterior  diaphragmatic hernia.     Organs: Liver, gallbladder, biliary tree, bilateral adrenal glands, bilateral  kidneys, spleen and pancreas are normal.     GI/Bowel: No ileus or obstruction.  Mild mucosal enhancement and wall  thickening of the distal sigmoid and rectum compatible with proctocolitis.  Proximal colectomy.  Mild descending and sigmoid colonic diverticulosis.  Uncomplicated right lower quadrant ileocolic anastomosis.     Pelvis: No adnexal mass or pelvic free fluid.     Peritoneum/Retroperitoneum: Wide-mouth ventral hernia containing loops of  small and large bowel without complication.  Normal caliber and contour of  the aorta.  No adenopathy or ascites.     Bones/Soft Tissues: Degenerative thoracolumbar dextroscoliosis.  Severe disc  and facet degenerative change at L4-L5 with 7 mm degenerative anterolisthesis  L4 on L5.  There is likely moderate to severe central canal stenosis at this  level.  Otherwise, diffuse mild

## 2024-01-26 NOTE — ED PROVIDER NOTES
HPI:  1/26/24, Time: 2:35 AM PETRA Rodriguez is a 68 y.o. female history of A-fib history of acute on chronic respiratory failure history of chronic back pain history of chronic kidney disease history of diabetes history of diverticulitis history of hernia history of hypothyroidism history of hypothyroidism history of rhabdomyolysis history of pulmonary hypertension history of pulmonary sarcoidosis presenting to the ED for rectal pain and abdominal pain, beginning days ago.  The complaint has been persistent, moderate in severity, and worsened by nothing.  Patient reports abdominal pain for the last several days she also reports rectal pain she reports blood in her stools as well.  Patient reporting shortness of breath but this is not new for her.  Patient does wear oxygen due to her history of heart failure.  And as well as COPD.  Patient reports she was seen here several days ago for the same complaint she was treated and released.  Patient reports no improvement of her symptoms.  Patient still has rectal bleeding.  Patient also still having rectal pain.  Patient reporting no productive cough or fever.  There is no urinary symptoms    ROS:   Pertinent positives and negatives are stated within HPI, all other systems reviewed and are negative.  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  Patient also still having rectal pain.  Patient reporting no productive cough or fever.  There is no urinary symptoms    Patient awake alert oriented x 3 heart exam normal lungs are clear abdomen soft she does have noted hernia reducible.  Patient able move all extremities.  Patient Hemoccult was positive.  Patient does not have rectal prolapse.  Patient differential includes diverticulosis as well as lower GI bleed as well as diverticulitis.  Patient also was complaining of shortness of breath other diagnoses include heart failure as well as COPD exacerbation also considered PE.  Patient though is on Eliquis.  Patient had lab work obtained white count was 5.1 hemoglobin is 9.9 patient's platelet count was 204 sodium is 139 potassium is 5.7 that was hemolyzed it was repeated was 4 BUN was 10 creatinine 0.9 patient's alk phos was 109 ALT and AST were 16 and 47 proBNP was 6464 troponin was 19 patient's chest x-ray showed cardiomegaly with pulmonary edema.  Patient EKG showed A-fib with T wave inversion looks unchanged compared to prior.  Patient was medicated with Lasix here IV.  Patient vital signs have been stable.  Patient was ordered morphine for body wide pain.  Patient's vital signs noted pulse ox stable.  Patient does not appear in any respiratory distress.  Patient made aware results and findings.  I did speak to family practice attending patient will be admitted to call was placed to resident.    Social Determinants affecting Dx or Tx: Patient is a past smoker    Chronic Conditions: Patient with history of atrial fibrillation history of heart failure history of respiratory failure history of COPD history of acid reflux history of lumbar disc herniation history of osteoarthritis history of parabronchial fibrosis history of pulmonary hypertension pulmonary sarcoidosis history of ventral hernia    Records Reviewed:   Patient was seen on 1/23/2020 proctocolitis.  Patient CT abdomen pelvis showed

## 2024-01-26 NOTE — ED NOTES
Compete bed change performed, pt boosted and warm blankets applied. Call light within reach. VSS. NAD.

## 2024-01-26 NOTE — ED NOTES
Pt has been asking for her purse. Several staff have looked for it. Her  states that pt's purse is at home.

## 2024-01-26 NOTE — CONSULTS
head of humerus ([facility]) 03/26/2007    Backache     Benign hypertension     CHF (congestive heart failure) ([facility])     Chronic back pain     Chronic kidney disease     stage 3    Chronic pain disorder     Chronic, continuous use of opioids     Compression fracture of thoracic vertebra ([facility])     T10    COPD (chronic obstructive pulmonary disease) ([facility])     Debility     Deformity of ankle and foot, acquired 01/31/2011    Depression     Diabetes insipidus ([facility])     Diverticulitis     Dry eyes     Encephalopathy acute 05/05/2017    Gait disturbance     GERD (gastroesophageal reflux disease)     Hemorrhoids 01/13/2012    Hernia     History of blood transfusion approx 05/2017    History of Clostridium difficile     negative culture 12-15-15; resolved    Hyperlipidemia     Hyperthyroidism     Hypothyroidism     Ischemic colitis, enteritis, or enterocolitis ([facility])     Long term (current) use of systemic steroids 07/10/2022    Long-term current use of steroids     Lumbar disc herniation     Myalgia and myositis, unspecified     Nephrosclerosis     Nonunion of fracture 02/22/2011    Osteoarthritis     severe    PAF (paroxysmal atrial fibrillation) ([facility])     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[facility])     PCWP mean:26.0 PA mean: 24.00; denies any recent issues    Pulmonary hypertension ([facility])     ventricular diastolic function consistent with abnormal relaxation (stage I)    Pulmonary sarcoidosis ([facility])     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, f/u w/ PCP    Rectal bleeding     Rhabdomyolysis 05/09/2011    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    Tibia fracture 07/2010    Ventral hernia without obstruction or gangrene 07/10/2022       Past Surgical History:   Procedure Laterality Date    ABDOMEN SURGERY  2008    ischemic colitis    COLONOSCOPY  2008    healed colitis    COLONOSCOPY  04/11/2014    COLONOSCOPY  11/23/2015    severe colitis    COLONOSCOPY  10/24/2016    COLONOSCOPY  07/26/2017    ECHO COMPL W DOP COLOR FLOW   10/23/23 2/20/24  Lauro Ellington MD   levETIRAcetam (KEPPRA) 500 MG tablet Take 1 tablet by mouth 2 times daily 10/23/23   Lauro Ellington MD   OXYGEN Inhale 4 L into the lungs continuous    Provider, MD Nellie       No Known Allergies    Family History   Problem Relation Age of Onset    Diabetes Mother     Arthritis Mother     High Blood Pressure Mother     Arthritis Father     High Blood Pressure Father     Diabetes Father     High Blood Pressure Sister     Alcohol Abuse Sister     Substance Abuse Brother     Substance Abuse Sister     Coronary Art Dis Neg Hx     Breast Cancer Neg Hx     Ovarian Cancer Neg Hx        Social History     Tobacco Use    Smoking status: Former     Current packs/day: 0.00     Average packs/day: 0.5 packs/day for 25.7 years (12.8 ttl pk-yrs)     Types: Cigarettes     Start date:      Quit date: 9/10/1996     Years since quittin.3    Smokeless tobacco: Never    Tobacco comments:     Patient quit smoking 26 yrs.ago.   Vaping Use    Vaping Use: Never used    Passive vaping exposure: Yes   Substance Use Topics    Alcohol use: Never     Alcohol/week: 0.0 standard drinks of alcohol    Drug use: Not Currently     Comment:  tory         Review of Systems   Constitutional:  Negative for chills, fatigue and fever.   HENT:  Negative for congestion and mouth sores.    Respiratory:  Negative for cough, chest tightness and shortness of breath.    Cardiovascular:  Negative for palpitations and leg swelling.   Gastrointestinal:  Positive for abdominal pain and blood in stool. Negative for nausea and vomiting.   Endocrine: Negative for polyuria.   Genitourinary:  Negative for difficulty urinating and dysuria.   Musculoskeletal:  Negative for gait problem and neck stiffness.   Skin:  Negative for rash.   Hematological:  Does not bruise/bleed easily.          PHYSICAL EXAM:    Vitals:    24 0730   BP: (!) 151/103   Pulse: 97   Resp: 17   Temp:    SpO2: 96%       General Appearance:

## 2024-01-26 NOTE — H&P
smoking use included cigarettes. She started smoking about 53 years ago. She has a 12.8 pack-year smoking history. She has never used smokeless tobacco. She reports that she does not currently use drugs. She reports that she does not drink alcohol.    Allergies: No Known Allergies     Home Medications:   No current facility-administered medications on file prior to encounter.     Current Outpatient Medications on File Prior to Encounter   Medication Sig Dispense Refill    amoxicillin-clavulanate (AUGMENTIN) 875-125 MG per tablet Take 1 tablet by mouth in the morning, at noon, and at bedtime for 10 days 20 tablet 0    HYDROcodone-acetaminophen (NORCO) 5-325 MG per tablet Take 1 tablet by mouth every 4 hours as needed for Pain for up to 5 days. Intended supply: 5 days. Take lowest dose possible to manage pain Max Daily Amount: 6 tablets 30 tablet 0    metoprolol succinate (TOPROL XL) 50 MG extended release tablet Take 1 tablet by mouth 2 times daily 30 tablet 3    digoxin 62.5 MCG TABS Take 62.5 mcg by mouth daily 30 tablet 3    Polyvinyl Alcohol-Povidone PF (REFRESH) 1.4-0.6 % SOLN ophthalmic solution Place 1 drop into both eyes in the morning and at bedtime 8 each 0    sildenafil (REVATIO) 20 MG tablet Take 0.5 tablets by mouth 3 times daily 30 tablet 3    ELIQUIS 5 MG TABS tablet Take 1 tablet by mouth 2 times daily 60 tablet 3    DULoxetine (CYMBALTA) 60 MG extended release capsule Take 1 capsule by mouth daily 30 capsule 3    levothyroxine (SYNTHROID) 50 MCG tablet Take 1 tablet by mouth daily 30 tablet 3    guaiFENesin 400 MG tablet Take 1 tablet by mouth as needed for Cough 30 tablet 0    furosemide (LASIX) 20 MG tablet Take 1 tablet by mouth daily 30 tablet 5    omega-3 acid ethyl esters (LOVAZA) 1 g capsule Take 2 capsules by mouth 2 times daily 60 capsule 3    Multiple Vitamin (MULTI-DAY VITAMINS) TABS Take 1 tablet by mouth daily 30 tablet 3    lidocaine (LIDODERM) 5 % Place 1 patch onto the skin daily 12  MD Evelyne on 1/26/24 at 7:51 AM EST  This case was discussed with attending physician: Dr. Valentino
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
## 2024-01-27 ENCOUNTER — ANESTHESIA EVENT (OUTPATIENT)
Dept: ENDOSCOPY | Age: 69
End: 2024-01-27
Payer: MEDICARE

## 2024-01-27 LAB
ALBUMIN SERPL-MCNC: 3.4 G/DL (ref 3.5–5.2)
ALP SERPL-CCNC: 101 U/L (ref 35–104)
ALT SERPL-CCNC: 9 U/L (ref 0–32)
ANION GAP SERPL CALCULATED.3IONS-SCNC: 13 MMOL/L (ref 7–16)
AST SERPL-CCNC: 23 U/L (ref 0–31)
BASOPHILS # BLD: 0.03 K/UL (ref 0–0.2)
BASOPHILS NFR BLD: 0 % (ref 0–2)
BILIRUB SERPL-MCNC: 0.3 MG/DL (ref 0–1.2)
BUN SERPL-MCNC: 13 MG/DL (ref 6–23)
CALCIUM SERPL-MCNC: 9.1 MG/DL (ref 8.6–10.2)
CHLORIDE SERPL-SCNC: 98 MMOL/L (ref 98–107)
CO2 SERPL-SCNC: 32 MMOL/L (ref 22–29)
CREAT SERPL-MCNC: 1.1 MG/DL (ref 0.5–1)
EOSINOPHIL # BLD: 0.23 K/UL (ref 0.05–0.5)
EOSINOPHILS RELATIVE PERCENT: 3 % (ref 0–6)
ERYTHROCYTE [DISTWIDTH] IN BLOOD BY AUTOMATED COUNT: 15.6 % (ref 11.5–15)
GFR SERPL CREATININE-BSD FRML MDRD: 53 ML/MIN/1.73M2
GLUCOSE SERPL-MCNC: 65 MG/DL (ref 74–99)
HCT VFR BLD AUTO: 32.4 % (ref 34–48)
HGB BLD-MCNC: 9.6 G/DL (ref 11.5–15.5)
IMM GRANULOCYTES # BLD AUTO: 0.03 K/UL (ref 0–0.58)
IMM GRANULOCYTES NFR BLD: 0 % (ref 0–5)
LYMPHOCYTES NFR BLD: 0.68 K/UL (ref 1.5–4)
LYMPHOCYTES RELATIVE PERCENT: 10 % (ref 20–42)
MCH RBC QN AUTO: 25.1 PG (ref 26–35)
MCHC RBC AUTO-ENTMCNC: 29.6 G/DL (ref 32–34.5)
MCV RBC AUTO: 84.8 FL (ref 80–99.9)
MONOCYTES NFR BLD: 0.57 K/UL (ref 0.1–0.95)
MONOCYTES NFR BLD: 8 % (ref 2–12)
NEUTROPHILS NFR BLD: 77 % (ref 43–80)
NEUTS SEG NFR BLD: 5.28 K/UL (ref 1.8–7.3)
PLATELET # BLD AUTO: 217 K/UL (ref 130–450)
PMV BLD AUTO: 11.3 FL (ref 7–12)
POTASSIUM SERPL-SCNC: 4 MMOL/L (ref 3.5–5)
PROT SERPL-MCNC: 6.5 G/DL (ref 6.4–8.3)
RBC # BLD AUTO: 3.82 M/UL (ref 3.5–5.5)
SODIUM SERPL-SCNC: 143 MMOL/L (ref 132–146)
WBC OTHER # BLD: 6.8 K/UL (ref 4.5–11.5)

## 2024-01-27 PROCEDURE — 87591 N.GONORRHOEAE DNA AMP PROB: CPT

## 2024-01-27 PROCEDURE — 87491 CHLMYD TRACH DNA AMP PROBE: CPT

## 2024-01-27 PROCEDURE — 80053 COMPREHEN METABOLIC PANEL: CPT

## 2024-01-27 PROCEDURE — 2580000003 HC RX 258

## 2024-01-27 PROCEDURE — 6360000002 HC RX W HCPCS: Performed by: STUDENT IN AN ORGANIZED HEALTH CARE EDUCATION/TRAINING PROGRAM

## 2024-01-27 PROCEDURE — 2060000000 HC ICU INTERMEDIATE R&B

## 2024-01-27 PROCEDURE — 94640 AIRWAY INHALATION TREATMENT: CPT

## 2024-01-27 PROCEDURE — 6360000002 HC RX W HCPCS: Performed by: EMERGENCY MEDICINE

## 2024-01-27 PROCEDURE — 6360000002 HC RX W HCPCS

## 2024-01-27 PROCEDURE — 6370000000 HC RX 637 (ALT 250 FOR IP)

## 2024-01-27 PROCEDURE — 99232 SBSQ HOSP IP/OBS MODERATE 35: CPT | Performed by: FAMILY MEDICINE

## 2024-01-27 PROCEDURE — 87389 HIV-1 AG W/HIV-1&-2 AB AG IA: CPT

## 2024-01-27 PROCEDURE — 80074 ACUTE HEPATITIS PANEL: CPT

## 2024-01-27 PROCEDURE — 36415 COLL VENOUS BLD VENIPUNCTURE: CPT

## 2024-01-27 PROCEDURE — 6370000000 HC RX 637 (ALT 250 FOR IP): Performed by: STUDENT IN AN ORGANIZED HEALTH CARE EDUCATION/TRAINING PROGRAM

## 2024-01-27 PROCEDURE — 86592 SYPHILIS TEST NON-TREP QUAL: CPT

## 2024-01-27 PROCEDURE — 85025 COMPLETE CBC W/AUTO DIFF WBC: CPT

## 2024-01-27 RX ORDER — METHYLPREDNISOLONE 4 MG/1
4 TABLET ORAL NIGHTLY
Status: DISCONTINUED | OUTPATIENT
Start: 2024-01-29 | End: 2024-01-29

## 2024-01-27 RX ORDER — METHYLPREDNISOLONE 4 MG/1
24 TABLET ORAL ONCE
Status: COMPLETED | OUTPATIENT
Start: 2024-01-27 | End: 2024-01-27

## 2024-01-27 RX ORDER — METHYLPREDNISOLONE 4 MG/1
4 TABLET ORAL
Status: DISCONTINUED | OUTPATIENT
Start: 2024-01-28 | End: 2024-01-29

## 2024-01-27 RX ORDER — LIDOCAINE 4 G/G
1 PATCH TOPICAL DAILY
Status: DISCONTINUED | OUTPATIENT
Start: 2024-01-27 | End: 2024-02-05 | Stop reason: HOSPADM

## 2024-01-27 RX ORDER — METHYLPREDNISOLONE 4 MG/1
8 TABLET ORAL NIGHTLY
Status: COMPLETED | OUTPATIENT
Start: 2024-01-28 | End: 2024-01-28

## 2024-01-27 RX ADMIN — HYDROCORTISONE: 1 CREAM TOPICAL at 21:04

## 2024-01-27 RX ADMIN — HYDROCORTISONE 15 MG: 5 TABLET ORAL at 09:23

## 2024-01-27 RX ADMIN — Medication 1000 UNITS: at 09:21

## 2024-01-27 RX ADMIN — BUDESONIDE 500 MCG: 0.5 INHALANT RESPIRATORY (INHALATION) at 07:54

## 2024-01-27 RX ADMIN — DESMOPRESSIN ACETATE 200 MCG: 0.1 TABLET ORAL at 20:16

## 2024-01-27 RX ADMIN — DULOXETINE HYDROCHLORIDE 60 MG: 60 CAPSULE, DELAYED RELEASE ORAL at 09:22

## 2024-01-27 RX ADMIN — ARFORMOTEROL TARTRATE 15 MCG: 15 SOLUTION RESPIRATORY (INHALATION) at 07:54

## 2024-01-27 RX ADMIN — FUROSEMIDE 20 MG: 20 TABLET ORAL at 09:20

## 2024-01-27 RX ADMIN — POTASSIUM CHLORIDE 20 MEQ: 1500 TABLET, EXTENDED RELEASE ORAL at 09:20

## 2024-01-27 RX ADMIN — LEVOTHYROXINE SODIUM 50 MCG: 0.05 TABLET ORAL at 06:09

## 2024-01-27 RX ADMIN — SILDENAFIL 10 MG: 20 TABLET, FILM COATED ORAL at 20:16

## 2024-01-27 RX ADMIN — LEVETIRACETAM 500 MG: 500 TABLET, FILM COATED ORAL at 09:20

## 2024-01-27 RX ADMIN — ARFORMOTEROL TARTRATE 15 MCG: 15 SOLUTION RESPIRATORY (INHALATION) at 20:22

## 2024-01-27 RX ADMIN — METHYLPREDNISOLONE 24 MG: 4 TABLET ORAL at 12:19

## 2024-01-27 RX ADMIN — HYDROCORTISONE 5 MG: 5 TABLET ORAL at 20:16

## 2024-01-27 RX ADMIN — METOPROLOL SUCCINATE 50 MG: 50 TABLET, EXTENDED RELEASE ORAL at 09:21

## 2024-01-27 RX ADMIN — HYDROCORTISONE: 1 CREAM TOPICAL at 09:26

## 2024-01-27 RX ADMIN — LEVETIRACETAM 500 MG: 500 TABLET, FILM COATED ORAL at 20:16

## 2024-01-27 RX ADMIN — FAMOTIDINE 20 MG: 20 TABLET, FILM COATED ORAL at 09:21

## 2024-01-27 RX ADMIN — Medication 10 ML: at 09:24

## 2024-01-27 RX ADMIN — METOPROLOL SUCCINATE 50 MG: 50 TABLET, EXTENDED RELEASE ORAL at 20:16

## 2024-01-27 RX ADMIN — BUDESONIDE 500 MCG: 0.5 INHALANT RESPIRATORY (INHALATION) at 20:22

## 2024-01-27 RX ADMIN — GUAIFENESIN 400 MG: 400 TABLET ORAL at 20:17

## 2024-01-27 RX ADMIN — Medication 10 ML: at 20:17

## 2024-01-27 RX ADMIN — SILDENAFIL 10 MG: 20 TABLET, FILM COATED ORAL at 09:22

## 2024-01-27 RX ADMIN — DESMOPRESSIN ACETATE 200 MCG: 0.1 TABLET ORAL at 09:22

## 2024-01-27 RX ADMIN — DIGOXIN 62.5 MCG: 125 TABLET ORAL at 09:21

## 2024-01-27 RX ADMIN — AMLODIPINE BESYLATE 5 MG: 5 TABLET ORAL at 09:21

## 2024-01-27 RX ADMIN — MORPHINE SULFATE 2 MG: 2 INJECTION, SOLUTION INTRAMUSCULAR; INTRAVENOUS at 06:08

## 2024-01-27 RX ADMIN — POLYVINYL ALCOHOL, POVIDONE 1 DROP: 14; 6 SOLUTION/ DROPS OPHTHALMIC at 20:17

## 2024-01-27 RX ADMIN — POLYVINYL ALCOHOL, POVIDONE 1 DROP: 14; 6 SOLUTION/ DROPS OPHTHALMIC at 09:24

## 2024-01-27 ASSESSMENT — PAIN SCALES - GENERAL
PAINLEVEL_OUTOF10: 10
PAINLEVEL_OUTOF10: 10

## 2024-01-27 ASSESSMENT — PAIN DESCRIPTION - LOCATION: LOCATION: ARM

## 2024-01-27 NOTE — ANESTHESIA PRE PROCEDURE
Department of Anesthesiology  Preprocedure Note       Name:  Rebekah Rodriguez   Age:  68 y.o.  :  1955                                          MRN:  [id number]         Date:  2024      Surgeon: Surgeon(s):  Carlos A Bae MD    Procedure: Procedure(s):  COLONOSCOPY DIAGNOSTIC    Medications prior to admission:   Prior to Admission medications    Medication Sig Start Date End Date Taking? Authorizing Provider   amoxicillin-clavulanate (AUGMENTIN) 875-125 MG per tablet Take 1 tablet by mouth 3 times daily 24  Nellie Burt MD   arformoterol tartrate (BROVANA) 15 MCG/2ML NEBU Take 1 ampule by nebulization in the morning and 1 ampule in the evening.    Nellie Burt MD   budesonide (PULMICORT) 0.5 MG/2ML nebulizer suspension Take 2 mLs by nebulization 2 times daily    Nellie Burt MD   digoxin (LANOXIN) 125 MCG tablet Take 0.5 tablets by mouth daily    Nellie Burt MD   ferrous sulfate (IRON 325) 325 (65 Fe) MG tablet Take 1 tablet by mouth 2 times daily    Nellie Burt MD   guaiFENesin 400 MG tablet Take 1 tablet by mouth 4 times daily as needed for Cough    Nellie Burt MD   potassium chloride (KLOR-CON M) 20 MEQ extended release tablet Take 1 tablet by mouth daily    Nellie Burt MD   HYDROcodone-acetaminophen (NORCO) 5-325 MG per tablet Take 1 tablet by mouth every 4 hours as needed for Pain for up to 5 days. Intended supply: 5 days. Take lowest dose possible to manage pain Max Daily Amount: 6 tablets 24  Rufus Hernandez DO   metoprolol succinate (TOPROL XL) 50 MG extended release tablet Take 1 tablet by mouth 2 times daily 24   Izabella Ludwig DO   Polyvinyl Alcohol-Povidone PF (REFRESH) 1.4-0.6 % SOLN ophthalmic solution Place 1 drop into both eyes in the morning and at bedtime 1/4/24 2/3/24  Izabella Ludwig DO   sildenafil (REVATIO) 20 MG tablet Take 0.5 tablets by mouth 3 times daily 24   Wanda

## 2024-01-27 NOTE — PLAN OF CARE
Problem: Discharge Planning  Goal: Discharge to home or other facility with appropriate resources  Outcome: Progressing  Flowsheets (Taken 1/27/2024 0805)  Discharge to home or other facility with appropriate resources: Identify barriers to discharge with patient and caregiver     Problem: Pain  Goal: Verbalizes/displays adequate comfort level or baseline comfort level  Outcome: Progressing     Problem: ABCDS Injury Assessment  Goal: Absence of physical injury  Outcome: Progressing     Problem: Skin/Tissue Integrity  Goal: Absence of new skin breakdown  Description: 1.  Monitor for areas of redness and/or skin breakdown  2.  Assess vascular access sites hourly  3.  Every 4-6 hours minimum:  Change oxygen saturation probe site  4.  Every 4-6 hours:  If on nasal continuous positive airway pressure, respiratory therapy assess nares and determine need for appliance change or resting period.  Outcome: Progressing     Problem: Safety - Adult  Goal: Free from fall injury  Outcome: Progressing

## 2024-01-27 NOTE — NURSE NAVIGATOR
4 Eyes Skin Assessment     NAME:  Rebekah Rodriguez  YOB: 1955  MEDICAL RECORD NUMBER:  [id number]    The patient is being assessed for  Admission    I agree that at least one RN has performed a thorough Head to Toe Skin Assessment on the patient. ALL assessment sites listed below have been assessed.      Areas assessed by both nurses:    Head, Face, Ears, Shoulders, Back, Chest, Arms, Elbows, Hands, Sacrum. Buttock, Coccyx, Ischium, Legs. Feet and Heels, Under Medical Devices , and Other ***        Does the Patient have a Wound? No noted wound(s)       Blas Prevention initiated by RN: yes  Wound Care Orders initiated by RN: No    Pressure Injury (Stage 3,4, Unstageable, DTI, NWPT, and Complex wounds) if present, place Wound referral order by RN under : No    New Ostomies, if present place, Ostomy referral order under : No     Nurse 1 eSignature: Electronically signed by Sven Julien RN on 1/27/24 at 2:02 AM EST    **SHARE this note so that the co-signing nurse can place an eSignature**    Nurse 2 eSignature: Electronically signed by Marleen Camp LPN on 1/27/24 at 2:05 AM EST
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
## 2024-01-27 NOTE — NURSE NAVIGATOR
Patient heard calling out around 120.She was yelling she did not feel good. Patient had removed her O2. She is 6L baseline. She was confused/combative and sating in the 40s. NRB placed and O2 raised to 90's. Patient regained mentation/ VS stable.

## 2024-01-28 PROBLEM — E43 SEVERE PROTEIN-CALORIE MALNUTRITION (HCC): Status: ACTIVE | Noted: 2023-08-22

## 2024-01-28 LAB
ALBUMIN SERPL-MCNC: 3.3 G/DL (ref 3.5–5.2)
ALP SERPL-CCNC: 95 U/L (ref 35–104)
ALT SERPL-CCNC: 9 U/L (ref 0–32)
ANION GAP SERPL CALCULATED.3IONS-SCNC: 12 MMOL/L (ref 7–16)
AST SERPL-CCNC: 18 U/L (ref 0–31)
BASOPHILS # BLD: 0.01 K/UL (ref 0–0.2)
BASOPHILS NFR BLD: 0 % (ref 0–2)
BILIRUB SERPL-MCNC: 0.3 MG/DL (ref 0–1.2)
BUN SERPL-MCNC: 22 MG/DL (ref 6–23)
CALCIUM SERPL-MCNC: 8.8 MG/DL (ref 8.6–10.2)
CHLORIDE SERPL-SCNC: 98 MMOL/L (ref 98–107)
CO2 SERPL-SCNC: 29 MMOL/L (ref 22–29)
CREAT SERPL-MCNC: 1.5 MG/DL (ref 0.5–1)
EOSINOPHIL # BLD: 0.02 K/UL (ref 0.05–0.5)
EOSINOPHILS RELATIVE PERCENT: 1 % (ref 0–6)
ERYTHROCYTE [DISTWIDTH] IN BLOOD BY AUTOMATED COUNT: 15.5 % (ref 11.5–15)
GFR SERPL CREATININE-BSD FRML MDRD: 38 ML/MIN/1.73M2
GLUCOSE SERPL-MCNC: 79 MG/DL (ref 74–99)
HCT VFR BLD AUTO: 30 % (ref 34–48)
HGB BLD-MCNC: 9 G/DL (ref 11.5–15.5)
IMM GRANULOCYTES # BLD AUTO: <0.03 K/UL (ref 0–0.58)
IMM GRANULOCYTES NFR BLD: 0 % (ref 0–5)
LYMPHOCYTES NFR BLD: 0.63 K/UL (ref 1.5–4)
LYMPHOCYTES RELATIVE PERCENT: 16 % (ref 20–42)
MCH RBC QN AUTO: 24.7 PG (ref 26–35)
MCHC RBC AUTO-ENTMCNC: 30 G/DL (ref 32–34.5)
MCV RBC AUTO: 82.4 FL (ref 80–99.9)
MONOCYTES NFR BLD: 0.53 K/UL (ref 0.1–0.95)
MONOCYTES NFR BLD: 14 % (ref 2–12)
NEUTROPHILS NFR BLD: 69 % (ref 43–80)
NEUTS SEG NFR BLD: 2.64 K/UL (ref 1.8–7.3)
PLATELET # BLD AUTO: 188 K/UL (ref 130–450)
PMV BLD AUTO: 10.1 FL (ref 7–12)
POTASSIUM SERPL-SCNC: 4.3 MMOL/L (ref 3.5–5)
PROT SERPL-MCNC: 6.4 G/DL (ref 6.4–8.3)
RBC # BLD AUTO: 3.64 M/UL (ref 3.5–5.5)
SODIUM SERPL-SCNC: 139 MMOL/L (ref 132–146)
WBC OTHER # BLD: 3.8 K/UL (ref 4.5–11.5)

## 2024-01-28 PROCEDURE — 6370000000 HC RX 637 (ALT 250 FOR IP)

## 2024-01-28 PROCEDURE — 80053 COMPREHEN METABOLIC PANEL: CPT

## 2024-01-28 PROCEDURE — 6370000000 HC RX 637 (ALT 250 FOR IP): Performed by: STUDENT IN AN ORGANIZED HEALTH CARE EDUCATION/TRAINING PROGRAM

## 2024-01-28 PROCEDURE — 2700000000 HC OXYGEN THERAPY PER DAY

## 2024-01-28 PROCEDURE — 99231 SBSQ HOSP IP/OBS SF/LOW 25: CPT | Performed by: SURGERY

## 2024-01-28 PROCEDURE — 85025 COMPLETE CBC W/AUTO DIFF WBC: CPT

## 2024-01-28 PROCEDURE — 6360000002 HC RX W HCPCS

## 2024-01-28 PROCEDURE — 99232 SBSQ HOSP IP/OBS MODERATE 35: CPT | Performed by: FAMILY MEDICINE

## 2024-01-28 PROCEDURE — 94640 AIRWAY INHALATION TREATMENT: CPT

## 2024-01-28 PROCEDURE — 2580000003 HC RX 258

## 2024-01-28 PROCEDURE — 36415 COLL VENOUS BLD VENIPUNCTURE: CPT

## 2024-01-28 PROCEDURE — 94660 CPAP INITIATION&MGMT: CPT

## 2024-01-28 PROCEDURE — 2060000000 HC ICU INTERMEDIATE R&B

## 2024-01-28 PROCEDURE — 6360000002 HC RX W HCPCS: Performed by: STUDENT IN AN ORGANIZED HEALTH CARE EDUCATION/TRAINING PROGRAM

## 2024-01-28 RX ORDER — POLYETHYLENE GLYCOL 3350 17 G/17G
238 POWDER, FOR SOLUTION ORAL ONCE
Status: COMPLETED | OUTPATIENT
Start: 2024-01-28 | End: 2024-01-28

## 2024-01-28 RX ORDER — 0.9 % SODIUM CHLORIDE 0.9 %
500 INTRAVENOUS SOLUTION INTRAVENOUS ONCE
Status: COMPLETED | OUTPATIENT
Start: 2024-01-28 | End: 2024-01-28

## 2024-01-28 RX ADMIN — SILDENAFIL 10 MG: 20 TABLET, FILM COATED ORAL at 09:30

## 2024-01-28 RX ADMIN — POLYETHYLENE GLYCOL 3350 238 G: 17 POWDER, FOR SOLUTION ORAL at 11:57

## 2024-01-28 RX ADMIN — DESMOPRESSIN ACETATE 200 MCG: 0.1 TABLET ORAL at 20:17

## 2024-01-28 RX ADMIN — ARFORMOTEROL TARTRATE 15 MCG: 15 SOLUTION RESPIRATORY (INHALATION) at 20:03

## 2024-01-28 RX ADMIN — FAMOTIDINE 20 MG: 20 TABLET, FILM COATED ORAL at 09:28

## 2024-01-28 RX ADMIN — ARFORMOTEROL TARTRATE 15 MCG: 15 SOLUTION RESPIRATORY (INHALATION) at 09:02

## 2024-01-28 RX ADMIN — ACETAMINOPHEN 325MG 650 MG: 325 TABLET ORAL at 20:17

## 2024-01-28 RX ADMIN — POLYVINYL ALCOHOL, POVIDONE 1 DROP: 14; 6 SOLUTION/ DROPS OPHTHALMIC at 20:19

## 2024-01-28 RX ADMIN — SILDENAFIL 10 MG: 20 TABLET, FILM COATED ORAL at 14:25

## 2024-01-28 RX ADMIN — METOPROLOL SUCCINATE 50 MG: 50 TABLET, EXTENDED RELEASE ORAL at 20:17

## 2024-01-28 RX ADMIN — METHYLPREDNISOLONE 4 MG: 4 TABLET ORAL at 11:14

## 2024-01-28 RX ADMIN — Medication 10 ML: at 20:19

## 2024-01-28 RX ADMIN — ACETAMINOPHEN 325MG 650 MG: 325 TABLET ORAL at 05:20

## 2024-01-28 RX ADMIN — METHYLPREDNISOLONE 4 MG: 4 TABLET ORAL at 09:30

## 2024-01-28 RX ADMIN — SODIUM CHLORIDE 500 ML: 9 INJECTION, SOLUTION INTRAVENOUS at 09:42

## 2024-01-28 RX ADMIN — LEVETIRACETAM 500 MG: 500 TABLET, FILM COATED ORAL at 20:17

## 2024-01-28 RX ADMIN — HYDROCORTISONE: 1 CREAM TOPICAL at 20:19

## 2024-01-28 RX ADMIN — DESMOPRESSIN ACETATE 200 MCG: 0.1 TABLET ORAL at 09:26

## 2024-01-28 RX ADMIN — HYDROCORTISONE 15 MG: 5 TABLET ORAL at 09:30

## 2024-01-28 RX ADMIN — LEVOTHYROXINE SODIUM 50 MCG: 0.05 TABLET ORAL at 05:20

## 2024-01-28 RX ADMIN — BUDESONIDE 500 MCG: 0.5 INHALANT RESPIRATORY (INHALATION) at 20:03

## 2024-01-28 RX ADMIN — METHYLPREDNISOLONE 4 MG: 4 TABLET ORAL at 18:56

## 2024-01-28 RX ADMIN — BUDESONIDE 500 MCG: 0.5 INHALANT RESPIRATORY (INHALATION) at 09:02

## 2024-01-28 RX ADMIN — METHYLPREDNISOLONE 8 MG: 4 TABLET ORAL at 20:18

## 2024-01-28 RX ADMIN — HYDROCORTISONE: 1 CREAM TOPICAL at 09:34

## 2024-01-28 RX ADMIN — HYDROCORTISONE 5 MG: 5 TABLET ORAL at 20:18

## 2024-01-28 RX ADMIN — SILDENAFIL 10 MG: 20 TABLET, FILM COATED ORAL at 20:18

## 2024-01-28 RX ADMIN — LEVETIRACETAM 500 MG: 500 TABLET, FILM COATED ORAL at 09:29

## 2024-01-28 RX ADMIN — Medication 10 ML: at 09:34

## 2024-01-28 RX ADMIN — Medication 1000 UNITS: at 09:26

## 2024-01-28 RX ADMIN — POLYVINYL ALCOHOL, POVIDONE 1 DROP: 14; 6 SOLUTION/ DROPS OPHTHALMIC at 09:33

## 2024-01-28 RX ADMIN — POTASSIUM CHLORIDE 20 MEQ: 1500 TABLET, EXTENDED RELEASE ORAL at 09:26

## 2024-01-28 RX ADMIN — DULOXETINE HYDROCHLORIDE 60 MG: 60 CAPSULE, DELAYED RELEASE ORAL at 09:29

## 2024-01-28 ASSESSMENT — PAIN SCALES - GENERAL: PAINLEVEL_OUTOF10: 6

## 2024-01-28 ASSESSMENT — PAIN DESCRIPTION - LOCATION: LOCATION: ABDOMEN

## 2024-01-28 NOTE — PROGRESS NOTES
Comprehensive Nutrition Assessment    Type and Reason for Visit:  Initial    Nutrition Recommendations/Plan:     Clear liquids for c-scope prep - Advance diet as appropriate    Will add nutrition supplements when able       Malnutrition Assessment:  Malnutrition Status:  Severe malnutrition (01/28/24 1336)    Context:  Chronic Illness     Findings of the 6 clinical characteristics of malnutrition:  Energy Intake:  75% or less estimated energy requirements for 1 month or longer (per previous RD encounter review)  Weight Loss:  Unable to assess (large wt fluctuations)     Body Fat Loss:   (Moderate) Orbital   Muscle Mass Loss:  Severe muscle mass loss Clavicles (pectoralis & deltoids), Temples (temporalis)  Fluid Accumulation:  No significant fluid accumulation    Strength:  Not Performed    Nutrition Assessment:    Pt admit w/ abd pain/ rectal bleeding. PMHx DM, CHF, COPD, Pulmonary sarcoidosis, CKD, Seizure, Colectomy/ colitis/ SBO/ Rectal prolapse. Pt meets criteria for Severe Malnurition. Noted CLD prep for c-scope tomorrow. Will monitor diet progression & add ONS when able/ after procedure.    Nutrition Related Findings:    Pt disoriented/ agitated at times, fluid bal WNL, no edema, active BS, abd pain Wound Type:  (scars noted)       Current Nutrition Intake & Therapies:    Average Meal Intake: 0%     ADULT DIET; Clear Liquid  Diet NPO Exceptions are: Sips of Water with Meds    Anthropometric Measures:  Height: 160 cm (5' 3\")  Ideal Body Weight (IBW): 115 lbs (52 kg)    Admission Body Weight: 57.6 kg (127 lb) (1/28 measured)  Current Body Weight: 57.9 kg (127 lb 11.2 oz) (1/28 measured), 111 % IBW.    Current BMI (kg/m2): 22.6  Usual Body Weight:  (variable EMR measured wt ranges 143-173 within past year)                       BMI Categories: Normal Weight (BMI 22.0 to 24.9) age over 65    Estimated Daily Nutrient Needs:  Energy Requirements Based On: Formula  Weight Used for Energy Requirements:

## 2024-01-28 NOTE — NURSE NAVIGATOR
Patient repeatedly pulling off bipap/ intermittently confused and combative when trying to replace. Patient drops immediately to 60-70's in minutes without O2. Have re-oriented and tried to educate patient on importance of maintaining O2 sats. No evidence of learning.

## 2024-01-28 NOTE — PROGRESS NOTES
Urine sample collected and sent to lab via tube system at this time       Electronically signed by Lety Batres RN on 1/28/2024 at 11:30 AM

## 2024-01-28 NOTE — PROGRESS NOTES
CC: rectal bleeding    Assessment:    Rectal bleeding  Rectal prolapse    Plan:  Cscope tomorrow with Dr. LETICIA Bae      Subjective:  No acute events. No new complaints.     Objective:  General - no apparent distress   Neuro - Awake, alert, attentive

## 2024-01-28 NOTE — NURSE NAVIGATOR
Patient with full bath/ linen change 0500. Patient confused and accusatory that staff is not taking care of her. Patient with second full bath at 0630. Still being verbally aggressive with RN and staff. Repeatedly pulls off O2 to 70% sat. Only one small episode of urine incontinence. MD notified of low urine output.

## 2024-01-28 NOTE — PROGRESS NOTES
[facility]  Family Medicine Attending    S: 68 y.o. female with PMH sarcoidosis, afib on eliquis, pulmonary hypertension, diabetes insipidus, hypothyroidism, hypertension, CKD stage 3a, and combined systolic/diastolic heart failure who presented with rectal pain and BRBPR.  Her hemoglobin is noted to be 9.9.  FOBT+.  Gen surg consulted. Plan for cscope 1/29/24.    1/27 pt confused. Takes off O2, and pulse ox drops significantly. Placed on 8 L NC (home baseline 6 L) with frequent reminder to keep O2 on. Concern pt's AMS is 2/2 opiates given last night and this AM.     1/28: more alert. C/o pain everywhere, chronic complaint for pt. H/o opiate dependence. Started medrol dosepak    O: VS- Blood pressure 110/74, pulse 68, temperature 96.9 °F (36.1 °C), temperature source Temporal, resp. rate 17, weight 57.9 kg (127 lb 11.2 oz), SpO2 98 %, not currently breastfeeding.  General appearance: NAD, alert and oriented to place but not person or time or situation  HEENT: NCAT, PERRLA, EOMI   Resp: CTAB, no WRC  CVS: RRR, no MRG  Abdomen: BS +, SNDNT  Extremities: No clubbing, cyanosis, or edema. Warm. Dry.      CT abdomen pelvis with IV contrast  FINDINGS:  Lower Chest: Pulmonary interstitial architectural distortion suggesting COPD.  Cardiomegaly.  No infiltrate or effusion.  Small fatty right posterior  diaphragmatic hernia.    Organs: Liver, gallbladder, biliary tree, bilateral adrenal glands, bilateral  kidneys, spleen and pancreas are normal.    GI/Bowel: No ileus or obstruction.  Mild mucosal enhancement and wall  thickening of the distal sigmoid and rectum compatible with proctocolitis.  Proximal colectomy.  Mild descending and sigmoid colonic diverticulosis.  Uncomplicated right lower quadrant ileocolic anastomosis.     Pelvis: No adnexal mass or pelvic free fluid.     Peritoneum/Retroperitoneum: Wide-mouth ventral hernia containing loops of  small and large bowel without complication.  Normal caliber

## 2024-01-28 NOTE — PROGRESS NOTES
Date: 1/27/2024    Time: 10:56 PM    Patient Placed On BIPAP/CPAP/ Non-Invasive Ventilation?  Yes    If no must comment.  Facial area red/color change? No           If YES are Blister/Lesion present?No   If yes must notify nursing staff  BIPAP/CPAP skin barrier?  Yes    Skin barrier type:mepilexlite       Comments:        Rose Marie Wallace RCP

## 2024-01-28 NOTE — PROGRESS NOTES
Messaged SROC to request easier prep for patient to tolerate. Pt unable to drink a large volume of fluid     Electronically signed by Lety Batres RN on 1/28/2024 at 9:00 AM

## 2024-01-28 NOTE — PLAN OF CARE
Problem: Discharge Planning  Goal: Discharge to home or other facility with appropriate resources  Outcome: Progressing  Flowsheets (Taken 1/28/2024 0900)  Discharge to home or other facility with appropriate resources: Identify discharge learning needs (meds, wound care, etc)     Problem: Pain  Goal: Verbalizes/displays adequate comfort level or baseline comfort level  Outcome: Progressing     Problem: ABCDS Injury Assessment  Goal: Absence of physical injury  Outcome: Progressing     Problem: Skin/Tissue Integrity  Goal: Absence of new skin breakdown  Description: 1.  Monitor for areas of redness and/or skin breakdown  2.  Assess vascular access sites hourly  3.  Every 4-6 hours minimum:  Change oxygen saturation probe site  4.  Every 4-6 hours:  If on nasal continuous positive airway pressure, respiratory therapy assess nares and determine need for appliance change or resting period.  Outcome: Progressing     Problem: Safety - Adult  Goal: Free from fall injury  Outcome: Progressing

## 2024-01-28 NOTE — PROGRESS NOTES
[facility] - Family Medicine Inpatient   Resident Progress Note    S:  Hospital day: 2   Brief Synopsis: Rebekah Rodriguez is a 68 y.o. female  with a PMH of pulmonary sarcoidosis, atrial fibrillation on Eliquis, pulmonary hypertension, diabetes insipidus, hypothyroidism, hypertension, chronic kidney disease stage IIIa and combined systolic/diastolic heart failure who presents to ED for  rectal pain and BRBPR.  Patient had presented to ED with similar concerns on 01/23/26, CTAP performed at the time revealed findings concerning proctitis. Patient was prescribed Augmentin and discharged.  Sx persisted so she returned to the ED. Patient endorses a Hx of external hemorrhoids, and rectal prolapse. She states that her  will occasionally reduce the the rectal prolapse at home.  In the ED, her hb was stable 9.9, proBNP 6464 (above baseline). UA pos for trace ketones, protein. Digoxin level subtherapeutic. FOBT positive.general surgery consulted. Patient was given zofran, lasix 20 and morphine 2. Pt admitted for rectal bleeding.    Overnight:  Patient has not voided since admission, bladder scan last night with 150cc residual volume  Patient endorsing persistent pain all over her body      Cont meds:    sodium chloride       Scheduled meds:    methylPREDNISolone  4 mg Oral QAM AC    methylPREDNISolone  4 mg Oral Lunch    methylPREDNISolone  4 mg Oral Dinner    methylPREDNISolone  8 mg Oral Nightly    [START ON 1/29/2024] methylPREDNISolone  4 mg Oral Nightly    lidocaine  1 patch TransDERmal Daily    polyethylene glycol  4,000 mL Oral Once    amLODIPine  5 mg Oral Daily    arformoterol tartrate  15 mcg Nebulization BID RT    budesonide  500 mcg Nebulization BID    desmopressin  200 mcg Oral BID    digoxin  62.5 mcg Oral Daily    DULoxetine  60 mg Oral Daily    [Held by provider] apixaban  5 mg Oral BID    famotidine  20 mg Oral Daily    furosemide  20 mg Oral Daily    hydrocortisone  5 mg Oral Nightly    hydrocortisone  15

## 2024-01-29 ENCOUNTER — ANESTHESIA (OUTPATIENT)
Dept: ENDOSCOPY | Age: 69
End: 2024-01-29
Payer: MEDICARE

## 2024-01-29 LAB
ALBUMIN SERPL-MCNC: 3.4 G/DL (ref 3.5–5.2)
ALP SERPL-CCNC: 107 U/L (ref 35–104)
ALT SERPL-CCNC: 10 U/L (ref 0–32)
ANION GAP SERPL CALCULATED.3IONS-SCNC: 15 MMOL/L (ref 7–16)
AST SERPL-CCNC: 24 U/L (ref 0–31)
BASOPHILS # BLD: 0 K/UL (ref 0–0.2)
BASOPHILS NFR BLD: 0 % (ref 0–2)
BILIRUB SERPL-MCNC: 0.3 MG/DL (ref 0–1.2)
BUN SERPL-MCNC: 21 MG/DL (ref 6–23)
CALCIUM SERPL-MCNC: 9.3 MG/DL (ref 8.6–10.2)
CHLORIDE SERPL-SCNC: 99 MMOL/L (ref 98–107)
CO2 SERPL-SCNC: 25 MMOL/L (ref 22–29)
CREAT SERPL-MCNC: 1 MG/DL (ref 0.5–1)
EOSINOPHIL # BLD: 0 K/UL (ref 0.05–0.5)
EOSINOPHILS RELATIVE PERCENT: 0 % (ref 0–6)
ERYTHROCYTE [DISTWIDTH] IN BLOOD BY AUTOMATED COUNT: 15.9 % (ref 11.5–15)
GFR SERPL CREATININE-BSD FRML MDRD: >60 ML/MIN/1.73M2
GLUCOSE BLD-MCNC: 104 MG/DL (ref 74–99)
GLUCOSE SERPL-MCNC: 75 MG/DL (ref 74–99)
HAV IGM SERPL QL IA: NONREACTIVE
HBV CORE IGM SERPL QL IA: NONREACTIVE
HBV SURFACE AG SERPL QL IA: NONREACTIVE
HCT VFR BLD AUTO: 33.1 % (ref 34–48)
HCV AB SERPL QL IA: NONREACTIVE
HGB BLD-MCNC: 9.7 G/DL (ref 11.5–15.5)
HIV 1+2 AB+HIV1 P24 AG SERPL QL IA: NONREACTIVE
LYMPHOCYTES NFR BLD: 0.45 K/UL (ref 1.5–4)
LYMPHOCYTES RELATIVE PERCENT: 18 % (ref 20–42)
MCH RBC QN AUTO: 25.3 PG (ref 26–35)
MCHC RBC AUTO-ENTMCNC: 29.3 G/DL (ref 32–34.5)
MCV RBC AUTO: 86.2 FL (ref 80–99.9)
MONOCYTES NFR BLD: 0.02 K/UL (ref 0.1–0.95)
MONOCYTES NFR BLD: 1 % (ref 2–12)
NEUTROPHILS NFR BLD: 81 % (ref 43–80)
NEUTS SEG NFR BLD: 2.03 K/UL (ref 1.8–7.3)
NUCLEATED RED BLOOD CELLS: 2 PER 100 WBC
PLATELET # BLD AUTO: 181 K/UL (ref 130–450)
PMV BLD AUTO: 11.9 FL (ref 7–12)
POTASSIUM SERPL-SCNC: 5.1 MMOL/L (ref 3.5–5)
PROT SERPL-MCNC: 6.8 G/DL (ref 6.4–8.3)
RBC # BLD AUTO: 3.84 M/UL (ref 3.5–5.5)
RBC # BLD: ABNORMAL 10*6/UL
RPR SER QL: NONREACTIVE
SODIUM SERPL-SCNC: 139 MMOL/L (ref 132–146)
WBC OTHER # BLD: 2.5 K/UL (ref 4.5–11.5)

## 2024-01-29 PROCEDURE — 3700000000 HC ANESTHESIA ATTENDED CARE: Performed by: SURGERY

## 2024-01-29 PROCEDURE — 94640 AIRWAY INHALATION TREATMENT: CPT

## 2024-01-29 PROCEDURE — 6370000000 HC RX 637 (ALT 250 FOR IP): Performed by: SURGERY

## 2024-01-29 PROCEDURE — 3700000001 HC ADD 15 MINUTES (ANESTHESIA): Performed by: SURGERY

## 2024-01-29 PROCEDURE — 6360000002 HC RX W HCPCS: Performed by: STUDENT IN AN ORGANIZED HEALTH CARE EDUCATION/TRAINING PROGRAM

## 2024-01-29 PROCEDURE — 99232 SBSQ HOSP IP/OBS MODERATE 35: CPT | Performed by: SURGERY

## 2024-01-29 PROCEDURE — 6370000000 HC RX 637 (ALT 250 FOR IP)

## 2024-01-29 PROCEDURE — 6360000002 HC RX W HCPCS: Performed by: NURSE ANESTHETIST, CERTIFIED REGISTERED

## 2024-01-29 PROCEDURE — 36415 COLL VENOUS BLD VENIPUNCTURE: CPT

## 2024-01-29 PROCEDURE — 0DJD8ZZ INSPECTION OF LOWER INTESTINAL TRACT, VIA NATURAL OR ARTIFICIAL OPENING ENDOSCOPIC: ICD-10-PCS | Performed by: FAMILY MEDICINE

## 2024-01-29 PROCEDURE — 2709999900 HC NON-CHARGEABLE SUPPLY: Performed by: SURGERY

## 2024-01-29 PROCEDURE — 94660 CPAP INITIATION&MGMT: CPT

## 2024-01-29 PROCEDURE — 3609027000 HC COLONOSCOPY: Performed by: SURGERY

## 2024-01-29 PROCEDURE — 85025 COMPLETE CBC W/AUTO DIFF WBC: CPT

## 2024-01-29 PROCEDURE — 6360000002 HC RX W HCPCS: Performed by: SURGERY

## 2024-01-29 PROCEDURE — 2060000000 HC ICU INTERMEDIATE R&B

## 2024-01-29 PROCEDURE — 7100000000 HC PACU RECOVERY - FIRST 15 MIN: Performed by: SURGERY

## 2024-01-29 PROCEDURE — 6360000002 HC RX W HCPCS

## 2024-01-29 PROCEDURE — 82962 GLUCOSE BLOOD TEST: CPT

## 2024-01-29 PROCEDURE — 80053 COMPREHEN METABOLIC PANEL: CPT

## 2024-01-29 PROCEDURE — 2580000003 HC RX 258

## 2024-01-29 PROCEDURE — 2580000003 HC RX 258: Performed by: SURGERY

## 2024-01-29 PROCEDURE — 99232 SBSQ HOSP IP/OBS MODERATE 35: CPT | Performed by: FAMILY MEDICINE

## 2024-01-29 PROCEDURE — 7100000001 HC PACU RECOVERY - ADDTL 15 MIN: Performed by: SURGERY

## 2024-01-29 PROCEDURE — 2580000003 HC RX 258: Performed by: NURSE ANESTHETIST, CERTIFIED REGISTERED

## 2024-01-29 PROCEDURE — 45378 DIAGNOSTIC COLONOSCOPY: CPT | Performed by: SURGERY

## 2024-01-29 RX ORDER — MECOBALAMIN 5000 MCG
5 TABLET,DISINTEGRATING ORAL NIGHTLY
Status: DISCONTINUED | OUTPATIENT
Start: 2024-01-29 | End: 2024-02-05 | Stop reason: HOSPADM

## 2024-01-29 RX ORDER — MECOBALAMIN 5000 MCG
5 TABLET,DISINTEGRATING ORAL NIGHTLY
Status: DISCONTINUED | OUTPATIENT
Start: 2024-01-29 | End: 2024-01-29

## 2024-01-29 RX ORDER — TRAZODONE HYDROCHLORIDE 50 MG/1
50 TABLET ORAL NIGHTLY PRN
Status: DISCONTINUED | OUTPATIENT
Start: 2024-01-29 | End: 2024-01-29

## 2024-01-29 RX ORDER — SODIUM CHLORIDE 9 MG/ML
INJECTION, SOLUTION INTRAVENOUS CONTINUOUS PRN
Status: DISCONTINUED | OUTPATIENT
Start: 2024-01-29 | End: 2024-01-29 | Stop reason: SDUPTHER

## 2024-01-29 RX ORDER — PROPOFOL 10 MG/ML
INJECTION, EMULSION INTRAVENOUS PRN
Status: DISCONTINUED | OUTPATIENT
Start: 2024-01-29 | End: 2024-01-29 | Stop reason: SDUPTHER

## 2024-01-29 RX ORDER — HYDROCORTISONE 20 MG/1
20 TABLET ORAL 2 TIMES DAILY
Status: DISCONTINUED | OUTPATIENT
Start: 2024-01-29 | End: 2024-02-05 | Stop reason: HOSPADM

## 2024-01-29 RX ADMIN — POTASSIUM CHLORIDE 20 MEQ: 1500 TABLET, EXTENDED RELEASE ORAL at 09:09

## 2024-01-29 RX ADMIN — ACETAMINOPHEN 325MG 650 MG: 325 TABLET ORAL at 21:10

## 2024-01-29 RX ADMIN — PROPOFOL 160 MG: 10 INJECTION, EMULSION INTRAVENOUS at 12:45

## 2024-01-29 RX ADMIN — Medication 10 ML: at 20:20

## 2024-01-29 RX ADMIN — HYDROCORTISONE 15 MG: 5 TABLET ORAL at 09:17

## 2024-01-29 RX ADMIN — SILDENAFIL 10 MG: 20 TABLET, FILM COATED ORAL at 09:12

## 2024-01-29 RX ADMIN — ARFORMOTEROL TARTRATE 15 MCG: 15 SOLUTION RESPIRATORY (INHALATION) at 19:56

## 2024-01-29 RX ADMIN — AMLODIPINE BESYLATE 5 MG: 5 TABLET ORAL at 09:09

## 2024-01-29 RX ADMIN — METOPROLOL SUCCINATE 50 MG: 50 TABLET, EXTENDED RELEASE ORAL at 09:10

## 2024-01-29 RX ADMIN — LEVOTHYROXINE SODIUM 50 MCG: 0.05 TABLET ORAL at 09:29

## 2024-01-29 RX ADMIN — DIGOXIN 62.5 MCG: 125 TABLET ORAL at 09:09

## 2024-01-29 RX ADMIN — Medication 5 MG: at 21:10

## 2024-01-29 RX ADMIN — ARFORMOTEROL TARTRATE 15 MCG: 15 SOLUTION RESPIRATORY (INHALATION) at 07:59

## 2024-01-29 RX ADMIN — FAMOTIDINE 20 MG: 20 TABLET, FILM COATED ORAL at 09:10

## 2024-01-29 RX ADMIN — Medication 10 ML: at 09:29

## 2024-01-29 RX ADMIN — BUDESONIDE 500 MCG: 0.5 INHALANT RESPIRATORY (INHALATION) at 07:59

## 2024-01-29 RX ADMIN — SODIUM CHLORIDE: 9 INJECTION, SOLUTION INTRAVENOUS at 12:33

## 2024-01-29 RX ADMIN — HYDROCORTISONE 20 MG: 20 TABLET ORAL at 20:20

## 2024-01-29 RX ADMIN — HYDROCORTISONE: 1 CREAM TOPICAL at 09:20

## 2024-01-29 RX ADMIN — DESMOPRESSIN ACETATE 200 MCG: 0.1 TABLET ORAL at 09:12

## 2024-01-29 RX ADMIN — METHYLPREDNISOLONE 4 MG: 4 TABLET ORAL at 09:12

## 2024-01-29 RX ADMIN — BUDESONIDE 500 MCG: 0.5 INHALANT RESPIRATORY (INHALATION) at 19:56

## 2024-01-29 RX ADMIN — LEVETIRACETAM 500 MG: 500 TABLET, FILM COATED ORAL at 20:20

## 2024-01-29 RX ADMIN — LEVETIRACETAM 500 MG: 500 TABLET, FILM COATED ORAL at 09:09

## 2024-01-29 RX ADMIN — SILDENAFIL 10 MG: 20 TABLET, FILM COATED ORAL at 20:21

## 2024-01-29 RX ADMIN — Medication 1000 UNITS: at 09:10

## 2024-01-29 RX ADMIN — DESMOPRESSIN ACETATE 200 MCG: 0.1 TABLET ORAL at 20:20

## 2024-01-29 RX ADMIN — DULOXETINE HYDROCHLORIDE 60 MG: 60 CAPSULE, DELAYED RELEASE ORAL at 09:09

## 2024-01-29 ASSESSMENT — PAIN SCALES - GENERAL
PAINLEVEL_OUTOF10: 4
PAINLEVEL_OUTOF10: 0
PAINLEVEL_OUTOF10: 0
PAINLEVEL_OUTOF10: 4
PAINLEVEL_OUTOF10: 0
PAINLEVEL_OUTOF10: 0

## 2024-01-29 ASSESSMENT — ENCOUNTER SYMPTOMS: SHORTNESS OF BREATH: 1

## 2024-01-29 ASSESSMENT — PAIN - FUNCTIONAL ASSESSMENT: PAIN_FUNCTIONAL_ASSESSMENT: ADULT NONVERBAL PAIN SCALE (NPVS)

## 2024-01-29 NOTE — PROGRESS NOTES
Physical Therapy  Physical Therapy Missed Visit    Name: Rebekah Rodriguez  : 1955  MRN: [id number]  Room #: 7401/7401-A    Date of Service: 2024    Attempted PT evaluation. Pt off the floor for procedure, will attempt again as schedule permits.    Cayetano Bedoya, PT, DPT  [id number]

## 2024-01-29 NOTE — PROGRESS NOTES
OT SESSION ATTEMPT     Date:2024  Patient Name: Rebekah Rodriguez  MRN: [id number]  : 1955  Room: [facility]/01-A     Attempted OT session this date:    [] unavailable due to other medical staff currently with pt   [] on hold, await MRI/ neurosurgical recommendations.   [] on hold per nursing staff secondary to lab / radiology results    [] Pt declined Occupational Therapy  this date.  Benefits of participation in therapy reviewed with pt.    [] off unit   [x] Other: pt on Bi-pap and lethargic this AM,     Will reattempt OT eval at a later time.    Celia Burris, OTR/L [id number]     16

## 2024-01-29 NOTE — PROGRESS NOTES
[facility]  Family Medicine Attending    S: 68 y.o. female with PMH sarcoidosis, afib on eliquis, pulmonary hypertension, diabetes insipidus, hypothyroidism, hypertension, CKD stage 3a, and combined systolic/diastolic heart failure who presented with rectal pain and BRBPR.  Her hemoglobin is noted to be 9.9.  FOBT+.  Gen surg consulted. Plan for cscope 1/29/24.    1/27 pt confused. Takes off O2, and pulse ox drops significantly. Placed on 8 L NC (home baseline 6 L) with frequent reminder to keep O2 on. Concern pt's AMS is 2/2 opiates given last night and this AM.     1/28: more alert. C/o pain everywhere, chronic complaint for pt. H/o opiate dependence. Started medrol dosepak    1/29:  patient seemingly not aware of doctors trying to take care of her     O: VS- Blood pressure 128/73, pulse 62, temperature 97.3 °F (36.3 °C), temperature source Temporal, resp. rate 14, height 1.6 m (5' 3\"), weight 57.9 kg (127 lb 11.2 oz), SpO2 93 %, not currently breastfeeding.    HEENT: somewhat drowsy, oriented x 1, on 6L NC  Declined exam         Impressions:  Rectal bleeding  Proctocolitis  Hemorrhoids  Chronic lung disease, not in acute exacerbation-sarcoidosis, COPD on 5-6 L O2 chronically  PAF on eliquis  Hypertension  Hypothyroidism  Chronic pain    Plan:     General surgery consulted. Cscope planned today  Imaging suggestive of proctocolitis-pt off of antibiotics  Surgery considering repair of rectal prolapse  Steroid cream rectally  Eliquis on hold  Trend h/h    Hx of CHF, afib, copd-eliquis on hold. lasix has been on hold. Elevated pbnp. Recheck pbnp tomorrow and cxr. Cpap overnight.    CKD3-Elevated Cr, decreased urination. 500 bolus, hold lasix, Cr ok 1/29    Delirium-Avoid opiates for now given propensity for AMS and hypoxia.   Patient with hx of sarcoidosis and generalized pain.  Medrol dosepak was started 2 days ago.  With delirium, will stop medrol. Check abg.   voltaren gel. lidocaine

## 2024-01-29 NOTE — PROGRESS NOTES
[facility] - Family Medicine Inpatient   Resident Progress Note    S:  Hospital day: 3   Brief Synopsis: Rebekah Rodriguez is a 68 y.o. female  with a PMH of pulmonary sarcoidosis, atrial fibrillation on Eliquis, pulmonary hypertension, diabetes insipidus, hypothyroidism, hypertension, chronic kidney disease stage IIIa and combined systolic/diastolic heart failure who presents to ED for  rectal pain and BRBPR.  Patient had presented to ED with similar concerns on 01/23/26, CTAP performed at the time revealed findings concerning proctitis. Patient was prescribed Augmentin and discharged.  Sx persisted so she returned to the ED. Patient endorses a Hx of external hemorrhoids, and rectal prolapse. She states that her  will occasionally reduce the the rectal prolapse at home.  In the ED, her hb was stable 9.9, proBNP 6464 (above baseline). UA pos for trace ketones, protein. Digoxin level subtherapeutic. FOBT positive.general surgery consulted. Patient was given zofran, lasix 20 and morphine 2. Pt admitted for rectal bleeding.    Overnight:  Patient had 2 voids overnight  Patient endorsing persistent pain all over her body, chills      Cont meds:    sodium chloride       Scheduled meds:    methylPREDNISolone  4 mg Oral QAM AC    methylPREDNISolone  4 mg Oral Lunch    methylPREDNISolone  4 mg Oral Dinner    methylPREDNISolone  4 mg Oral Nightly    lidocaine  1 patch TransDERmal Daily    amLODIPine  5 mg Oral Daily    arformoterol tartrate  15 mcg Nebulization BID RT    budesonide  500 mcg Nebulization BID    desmopressin  200 mcg Oral BID    digoxin  62.5 mcg Oral Daily    DULoxetine  60 mg Oral Daily    [Held by provider] apixaban  5 mg Oral BID    famotidine  20 mg Oral Daily    [Held by provider] furosemide  20 mg Oral Daily    hydrocortisone  5 mg Oral Nightly    hydrocortisone  15 mg Oral QAM    levETIRAcetam  500 mg Oral BID    levothyroxine  50 mcg Oral Daily    metoprolol succinate  50 mg Oral BID    Polyvinyl

## 2024-01-29 NOTE — CARE COORDINATION
1/29/24 CM note. Pt admitted 1/26/24 for heart failure, c/o rectal pain.  Colonoscopy today.  Planning for rectosigmoidectomy 1/31/24.  Discharge plan at this time is to return home ,  will provide transportation.  Pt is active with [facility] has setup for 4L/NC and Bipap.  Pt also has cane and walker.   Maribel HERRERAN RN-BC  [phone]

## 2024-01-29 NOTE — OP NOTE
Colonoscopy Op Note    DATE OF PROCEDURE: 1/29/2024    SURGEON: Carlos A Bae MD    PREOPERATIVE DIAGNOSIS: rectal prolapse, blood per rectum      POSTOPERATIVE DIAGNOSIS: Same, diverticulosis     OPERATION: Procedure(s):  COLONOSCOPY DIAGNOSTIC    ANESTHESIA: Local monitored anesthesia.     ESTIMATED BLOOD LOSS: nil     COMPLICATIONS: None.     SPECIMENS:   * No specimens in log *    HISTORY: The patient is a 68 y.o. year old female with history of above preop diagnosis.  I recommended colonoscopy with possible biopsy or polypectomy and I explained the risk, benefits, expected outcome, and alternatives to the procedure.  Risks included but are not limited to bleeding, infection, respiratory distress, hypotension, and perforation of the colon.  The patient understands and is in agreement.        PROCEDURE: The patient was given IV conscious sedation per anesthesia. The patient was given supplemental oxygen by nasal cannula.  The colonoscope was inserted per rectum and advanced under direct vision to the cecum with difficulty, identified by ileocecal valve.  The prep was fair so exam was adequate.      FINDINGS:    KIKE: normal     Cecum/Ascending colon:normal     Transverse colon: normal     Descending/Sigmoid colon: normal, diverticulosis      Rectum/Anus: examined in normal and retroflexed positions, normal     The colon was decompressed and the scope was removed.  The withdraw time was approximately 8 minutes.  The patient tolerated the procedure well.     ASSESSMENT/PLAN:   Discuss surgical options for rectal prolapse.   Maintain normal CRC screening per age and risk factors.      Carlos A Bae MD  01/29/24  1:02 PM

## 2024-01-29 NOTE — ANESTHESIA PRE PROCEDURE
Department of Anesthesiology  Preprocedure Note       Name:  Rebekah Rodriguez   Age:  68 y.o.  :  1955                                          MRN:  [id number]         Date:  2024      Surgeon: Surgeon(s):  Carlos A Bae MD    Procedure: Procedure(s):  COLONOSCOPY DIAGNOSTIC    Medications prior to admission:   Prior to Admission medications    Medication Sig Start Date End Date Taking? Authorizing Provider   amoxicillin-clavulanate (AUGMENTIN) 875-125 MG per tablet Take 1 tablet by mouth 3 times daily 24 Yes ProviderNellie MD   arformoterol tartrate (BROVANA) 15 MCG/2ML NEBU Take 1 ampule by nebulization in the morning and 1 ampule in the evening.   Yes ProviderNellie MD   budesonide (PULMICORT) 0.5 MG/2ML nebulizer suspension Take 2 mLs by nebulization 2 times daily   Yes ProviderNellie MD   digoxin (LANOXIN) 125 MCG tablet Take 0.5 tablets by mouth daily   Yes ProviderNellie MD   ferrous sulfate (IRON 325) 325 (65 Fe) MG tablet Take 1 tablet by mouth 2 times daily   Yes Provider, MD Nellie   guaiFENesin 400 MG tablet Take 1 tablet by mouth 4 times daily as needed for Cough   Yes Provider, MD Nellie   potassium chloride (KLOR-CON M) 20 MEQ extended release tablet Take 1 tablet by mouth daily   Yes ProviderNellie MD   metoprolol succinate (TOPROL XL) 50 MG extended release tablet Take 1 tablet by mouth 2 times daily 24   Izabella Ludwig DO   Polyvinyl Alcohol-Povidone PF (REFRESH) 1.4-0.6 % SOLN ophthalmic solution Place 1 drop into both eyes in the morning and at bedtime 1/4/24 2/3/24  Izabella Ludwig DO   sildenafil (REVATIO) 20 MG tablet Take 0.5 tablets by mouth 3 times daily 24   Timothy Muñoz APRN - CNP   ELIQUIS 5 MG TABS tablet Take 1 tablet by mouth 2 times daily 23  Lauro Ellington MD   DULoxetine (CYMBALTA) 60 MG extended release capsule Take 1 capsule by mouth daily 23   Lauro Ellington MD   levothyroxine

## 2024-01-29 NOTE — PROGRESS NOTES
GENERAL SURGERY  DAILY PROGRESS NOTE  1/29/2024    Subjective:  No new complaints or overnight events. Drank all prep. Almost clear from below.    Objective:  /72   Pulse 72   Temp 97.3 °F (36.3 °C) (Temporal)   Resp 18   Ht 1.6 m (5' 3\")   Wt 57.9 kg (127 lb 11.2 oz)   LMP  (LMP Unknown)   SpO2 97%   BMI 22.62 kg/m²     General Appearance:  awake, alert, oriented, in no acute distress  Skin:  Skin color, texture, turgor normal  Head/face:  NCAT  Eyes:  No gross abnormalities. Sclera nonicteric  Lungs/Chest:  Normal expansion. No respiratory distress. On CPAP.  Heart: Warm throughout. Regular rate   Abdomen:  Soft, no tenderness, not distended.    Extremities: Extremities warm to touch, pink, with no edema.      Assessment/Plan:  68 y.o. female with BRBPR with known rectal prolapse     - Enemas this morning   - Cscope planned today   - possible intervention for prolapse pending cscope results  - Ok for regular diet after scope     Electronically signed by Shashank Sands DO on 1/29/2024 at 7:26 AM      Attending Attestation   I saw and examined the patient, I agree with resident note      Hx taken from patient     Anemia, rectal prolapse in setting of sarcoidosis on chronic steroids, COPD, PAF on eliquis, HTN, ventral hernia.     Colonoscopy today,     Carlos A Bae MD FACS

## 2024-01-30 ENCOUNTER — APPOINTMENT (OUTPATIENT)
Dept: GENERAL RADIOLOGY | Age: 69
End: 2024-01-30
Payer: MEDICARE

## 2024-01-30 LAB
ALBUMIN SERPL-MCNC: 3.5 G/DL (ref 3.5–5.2)
ALP SERPL-CCNC: 104 U/L (ref 35–104)
ALT SERPL-CCNC: 11 U/L (ref 0–32)
ANION GAP SERPL CALCULATED.3IONS-SCNC: 10 MMOL/L (ref 7–16)
AST SERPL-CCNC: 21 U/L (ref 0–31)
B.E.: 8.8 MMOL/L (ref -3–3)
BASOPHILS # BLD: 0.01 K/UL (ref 0–0.2)
BASOPHILS NFR BLD: 0 % (ref 0–2)
BILIRUB SERPL-MCNC: 0.2 MG/DL (ref 0–1.2)
BNP SERPL-MCNC: 8143 PG/ML (ref 0–125)
BUN SERPL-MCNC: 26 MG/DL (ref 6–23)
CALCIUM SERPL-MCNC: 9.3 MG/DL (ref 8.6–10.2)
CHLORIDE SERPL-SCNC: 103 MMOL/L (ref 98–107)
CO2 SERPL-SCNC: 30 MMOL/L (ref 22–29)
COHB: 0.5 % (ref 0–1.5)
CREAT SERPL-MCNC: 1.3 MG/DL (ref 0.5–1)
CRITICAL: ABNORMAL
DATE ANALYZED: ABNORMAL
DATE OF COLLECTION: ABNORMAL
EOSINOPHIL # BLD: 0.02 K/UL (ref 0.05–0.5)
EOSINOPHILS RELATIVE PERCENT: 0 % (ref 0–6)
ERYTHROCYTE [DISTWIDTH] IN BLOOD BY AUTOMATED COUNT: 16.3 % (ref 11.5–15)
GFR SERPL CREATININE-BSD FRML MDRD: 44 ML/MIN/1.73M2
GLUCOSE SERPL-MCNC: 95 MG/DL (ref 74–99)
HCO3: 34.4 MMOL/L (ref 22–26)
HCT VFR BLD AUTO: 33.4 % (ref 34–48)
HGB BLD-MCNC: 10.2 G/DL (ref 11.5–15.5)
HHB: 1.6 % (ref 0–5)
IMM GRANULOCYTES # BLD AUTO: <0.03 K/UL (ref 0–0.58)
IMM GRANULOCYTES NFR BLD: 0 % (ref 0–5)
LAB: ABNORMAL
LYMPHOCYTES NFR BLD: 0.54 K/UL (ref 1.5–4)
LYMPHOCYTES RELATIVE PERCENT: 12 % (ref 20–42)
Lab: 1930
MCH RBC QN AUTO: 25.7 PG (ref 26–35)
MCHC RBC AUTO-ENTMCNC: 30.5 G/DL (ref 32–34.5)
MCV RBC AUTO: 84.1 FL (ref 80–99.9)
METHB: 0.4 % (ref 0–1.5)
MODE: ABNORMAL
MONOCYTES NFR BLD: 0.47 K/UL (ref 0.1–0.95)
MONOCYTES NFR BLD: 11 % (ref 2–12)
NEUTROPHILS NFR BLD: 77 % (ref 43–80)
NEUTS SEG NFR BLD: 3.42 K/UL (ref 1.8–7.3)
O2 CONTENT: 15.7 ML/DL
O2 SATURATION: 98.4 % (ref 92–98.5)
O2HB: 97.5 % (ref 94–97)
OPERATOR ID: ABNORMAL
PATIENT TEMP: 37 C
PCO2: 52.4 MMHG (ref 35–45)
PH BLOOD GAS: 7.43 (ref 7.35–7.45)
PLATELET # BLD AUTO: 228 K/UL (ref 130–450)
PMV BLD AUTO: 11.3 FL (ref 7–12)
PO2: 121.1 MMHG (ref 75–100)
POTASSIUM SERPL-SCNC: 4.3 MMOL/L (ref 3.5–5)
PROT SERPL-MCNC: 6.6 G/DL (ref 6.4–8.3)
RBC # BLD AUTO: 3.97 M/UL (ref 3.5–5.5)
RBC # BLD: ABNORMAL 10*6/UL
SODIUM SERPL-SCNC: 143 MMOL/L (ref 132–146)
SOURCE, BLOOD GAS: ABNORMAL
THB: 11.3 G/DL (ref 11.5–16.5)
TIME ANALYZED: 1938
WBC OTHER # BLD: 4.5 K/UL (ref 4.5–11.5)

## 2024-01-30 PROCEDURE — 83880 ASSAY OF NATRIURETIC PEPTIDE: CPT

## 2024-01-30 PROCEDURE — 36600 WITHDRAWAL OF ARTERIAL BLOOD: CPT

## 2024-01-30 PROCEDURE — 99232 SBSQ HOSP IP/OBS MODERATE 35: CPT | Performed by: SURGERY

## 2024-01-30 PROCEDURE — 97161 PT EVAL LOW COMPLEX 20 MIN: CPT

## 2024-01-30 PROCEDURE — 97535 SELF CARE MNGMENT TRAINING: CPT

## 2024-01-30 PROCEDURE — 6370000000 HC RX 637 (ALT 250 FOR IP)

## 2024-01-30 PROCEDURE — 71046 X-RAY EXAM CHEST 2 VIEWS: CPT

## 2024-01-30 PROCEDURE — 94640 AIRWAY INHALATION TREATMENT: CPT

## 2024-01-30 PROCEDURE — 6360000002 HC RX W HCPCS: Performed by: SURGERY

## 2024-01-30 PROCEDURE — 97165 OT EVAL LOW COMPLEX 30 MIN: CPT

## 2024-01-30 PROCEDURE — 85025 COMPLETE CBC W/AUTO DIFF WBC: CPT

## 2024-01-30 PROCEDURE — 2060000000 HC ICU INTERMEDIATE R&B

## 2024-01-30 PROCEDURE — 97530 THERAPEUTIC ACTIVITIES: CPT

## 2024-01-30 PROCEDURE — 2580000003 HC RX 258: Performed by: SURGERY

## 2024-01-30 PROCEDURE — 6360000002 HC RX W HCPCS

## 2024-01-30 PROCEDURE — 82805 BLOOD GASES W/O2 SATURATION: CPT

## 2024-01-30 PROCEDURE — 99232 SBSQ HOSP IP/OBS MODERATE 35: CPT | Performed by: FAMILY MEDICINE

## 2024-01-30 PROCEDURE — 6370000000 HC RX 637 (ALT 250 FOR IP): Performed by: SURGERY

## 2024-01-30 PROCEDURE — 36415 COLL VENOUS BLD VENIPUNCTURE: CPT

## 2024-01-30 PROCEDURE — 94660 CPAP INITIATION&MGMT: CPT

## 2024-01-30 PROCEDURE — 80053 COMPREHEN METABOLIC PANEL: CPT

## 2024-01-30 RX ORDER — FUROSEMIDE 10 MG/ML
20 INJECTION INTRAMUSCULAR; INTRAVENOUS ONCE
Status: COMPLETED | OUTPATIENT
Start: 2024-01-30 | End: 2024-01-30

## 2024-01-30 RX ORDER — METRONIDAZOLE 500 MG/100ML
500 INJECTION, SOLUTION INTRAVENOUS
Status: DISCONTINUED | OUTPATIENT
Start: 2024-01-31 | End: 2024-02-03

## 2024-01-30 RX ADMIN — ACETAMINOPHEN 325MG 650 MG: 325 TABLET ORAL at 20:13

## 2024-01-30 RX ADMIN — Medication 10 ML: at 09:36

## 2024-01-30 RX ADMIN — LEVOTHYROXINE SODIUM 50 MCG: 0.05 TABLET ORAL at 06:26

## 2024-01-30 RX ADMIN — ARFORMOTEROL TARTRATE 15 MCG: 15 SOLUTION RESPIRATORY (INHALATION) at 19:43

## 2024-01-30 RX ADMIN — Medication 10 ML: at 20:15

## 2024-01-30 RX ADMIN — FUROSEMIDE 20 MG: 10 INJECTION, SOLUTION INTRAVENOUS at 13:13

## 2024-01-30 RX ADMIN — HYDROCORTISONE: 1 CREAM TOPICAL at 09:33

## 2024-01-30 RX ADMIN — FAMOTIDINE 20 MG: 20 TABLET, FILM COATED ORAL at 09:30

## 2024-01-30 RX ADMIN — DIGOXIN 62.5 MCG: 125 TABLET ORAL at 09:30

## 2024-01-30 RX ADMIN — DULOXETINE HYDROCHLORIDE 60 MG: 60 CAPSULE, DELAYED RELEASE ORAL at 09:28

## 2024-01-30 RX ADMIN — SILDENAFIL 10 MG: 20 TABLET, FILM COATED ORAL at 20:13

## 2024-01-30 RX ADMIN — HYDROCORTISONE 20 MG: 20 TABLET ORAL at 20:13

## 2024-01-30 RX ADMIN — ACETAMINOPHEN 325MG 650 MG: 325 TABLET ORAL at 09:31

## 2024-01-30 RX ADMIN — Medication 1000 UNITS: at 09:30

## 2024-01-30 RX ADMIN — POLYVINYL ALCOHOL, POVIDONE 1 DROP: 14; 6 SOLUTION/ DROPS OPHTHALMIC at 09:36

## 2024-01-30 RX ADMIN — LEVETIRACETAM 500 MG: 500 TABLET, FILM COATED ORAL at 20:13

## 2024-01-30 RX ADMIN — METOPROLOL SUCCINATE 50 MG: 50 TABLET, EXTENDED RELEASE ORAL at 20:13

## 2024-01-30 RX ADMIN — SILDENAFIL 10 MG: 20 TABLET, FILM COATED ORAL at 09:35

## 2024-01-30 RX ADMIN — SILDENAFIL 10 MG: 20 TABLET, FILM COATED ORAL at 13:12

## 2024-01-30 RX ADMIN — DESMOPRESSIN ACETATE 200 MCG: 0.1 TABLET ORAL at 22:41

## 2024-01-30 RX ADMIN — HYDROCORTISONE 20 MG: 20 TABLET ORAL at 09:35

## 2024-01-30 RX ADMIN — METOPROLOL SUCCINATE 50 MG: 50 TABLET, EXTENDED RELEASE ORAL at 09:29

## 2024-01-30 RX ADMIN — Medication 5 MG: at 20:13

## 2024-01-30 RX ADMIN — POTASSIUM CHLORIDE 20 MEQ: 1500 TABLET, EXTENDED RELEASE ORAL at 09:28

## 2024-01-30 RX ADMIN — POLYVINYL ALCOHOL, POVIDONE 1 DROP: 14; 6 SOLUTION/ DROPS OPHTHALMIC at 20:14

## 2024-01-30 RX ADMIN — AMLODIPINE BESYLATE 5 MG: 5 TABLET ORAL at 09:29

## 2024-01-30 RX ADMIN — DESMOPRESSIN ACETATE 200 MCG: 0.1 TABLET ORAL at 10:00

## 2024-01-30 RX ADMIN — GUAIFENESIN 400 MG: 400 TABLET ORAL at 09:29

## 2024-01-30 RX ADMIN — FUROSEMIDE 20 MG: 20 TABLET ORAL at 09:29

## 2024-01-30 RX ADMIN — LEVETIRACETAM 500 MG: 500 TABLET, FILM COATED ORAL at 09:29

## 2024-01-30 RX ADMIN — BUDESONIDE 500 MCG: 0.5 INHALANT RESPIRATORY (INHALATION) at 19:43

## 2024-01-30 ASSESSMENT — PAIN SCALES - GENERAL
PAINLEVEL_OUTOF10: 4
PAINLEVEL_OUTOF10: 0
PAINLEVEL_OUTOF10: 3
PAINLEVEL_OUTOF10: 0

## 2024-01-30 ASSESSMENT — PAIN DESCRIPTION - LOCATION
LOCATION: BACK
LOCATION: BACK

## 2024-01-30 NOTE — ANESTHESIA POSTPROCEDURE EVALUATION
Department of Anesthesiology  Postprocedure Note    Patient: Rebekah Rodriguez  MRN: [id number]  YOB: 1955  Date of evaluation: 1/30/2024    Procedure Summary     Date: 01/29/24 Room / Location: [place] [address] / [facility]    Anesthesia Start: 1233 Anesthesia Stop: 1306    Procedure: COLONOSCOPY DIAGNOSTIC Diagnosis:       Rectal prolapse      (Rectal prolapse [K62.3])    Surgeons: Carlos A Bae MD Responsible Provider: Jenny Roth MD    Anesthesia Type: MAC ASA Status: 4          Anesthesia Type: No value filed.    Amy Phase I: Amy Score: 9    Amy Phase II:      Anesthesia Post Evaluation    Patient location during evaluation: PACU  Patient participation: complete - patient participated  Level of consciousness: awake and alert  Airway patency: patent  Nausea & Vomiting: no nausea and no vomiting  Cardiovascular status: hemodynamically stable  Respiratory status: acceptable  Hydration status: euvolemic  Pain management: satisfactory to patient        No notable events documented.

## 2024-01-30 NOTE — PROGRESS NOTES
GENERAL SURGERY  DAILY PROGRESS NOTE    Patient's Name/Date of Birth: Rebekah Rodriguez / 1955    Date: 2024     Chief Complaint   Patient presents with    Rectal Pain     Patient complaining of pain from her rectum.  States it has been going on for the past few days and no relief.        Subjective:  No clinical signs of bleeding overnight      Objective:  Last 24Hrs  Temp  Av.4 °F (36.3 °C)  Min: 97.2 °F (36.2 °C)  Max: 97.7 °F (36.5 °C)  Resp  Av.2  Min: 12  Max: 23  Pulse  Av.7  Min: 61  Max: 82  Systolic (24hrs), Av , Min:95 , Max:132     Diastolic (24hrs), Av, Min:51, Max:85    SpO2  Av.4 %  Min: 93 %  Max: 100 %    I/O last 3 completed shifts:  In: 150 [I.V.:150]  Out: 100 [Urine:100]      General: In no acute distress, conversant, pleasantly confused   Cardiovascular: Warm throughout, no edema  Respiratory: no respiratory distress, equal chest rise  Abdomen: soft,  nontender, nondistended  Skin: no obvious rashes or lesions appreciated, no jaundice  Extremities: atraumatic, no focal motor deficits, no open wounds      CBC  Recent Labs     24  0841 24  0639   WBC 3.8* 2.5*   RBC 3.64 3.84   HGB 9.0* 9.7*   HCT 30.0* 33.1*   MCV 82.4 86.2   MCH 24.7* 25.3*   MCHC 30.0* 29.3*   RDW 15.5* 15.9*    181   MPV 10.1 11.9       CMP  Recent Labs     24  0841 24  0639    139   K 4.3 5.1*   CL 98 99   CO2 29 25   BUN 22 21   CREATININE 1.5* 1.0   GLUCOSE 79 75   CALCIUM 8.8 9.3   PROT 6.4 6.8   LABALBU 3.3* 3.4*   BILITOT 0.3 0.3   ALKPHOS 95 107*   AST 18 24   ALT 9 10         Assessment/Plan:    Patient Active Problem List   Diagnosis    Chronic back pain    Hypothyroidism    Nephrosclerosis    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

## 2024-01-30 NOTE — PROGRESS NOTES
[facility] - Family Medicine Inpatient   Resident Progress Note    S:  Hospital day: 4   Brief Synopsis: Rebekah Rodriguez is a 68 y.o. female  with a PMH of pulmonary sarcoidosis, atrial fibrillation on Eliquis, pulmonary hypertension, diabetes insipidus, hypothyroidism, hypertension, chronic kidney disease stage IIIa and combined systolic/diastolic heart failure who presents to ED for  rectal pain and BRBPR.  Patient had presented to ED with similar concerns on 01/23/26, CTAP performed at the time revealed findings concerning proctitis. Patient was prescribed Augmentin and discharged.  Sx persisted so she returned to the ED. Patient endorses a Hx of external hemorrhoids, and rectal prolapse. She states that her  will occasionally reduce the the rectal prolapse at home.  In the ED, her hb was stable 9.9, proBNP 6464 (above baseline). UA pos for trace ketones, protein. Digoxin level subtherapeutic. FOBT positive.general surgery consulted. Patient was given zofran, lasix 20 and morphine 2. Pt admitted for rectal bleeding.    Overnight:  No overnight events  Patient more alert and awake, responsive      Cont meds:    sodium chloride       Scheduled meds:    hydrocortisone  20 mg Oral BID    melatonin  5 mg Oral Nightly    lidocaine  1 patch TransDERmal Daily    amLODIPine  5 mg Oral Daily    arformoterol tartrate  15 mcg Nebulization BID RT    budesonide  500 mcg Nebulization BID    desmopressin  200 mcg Oral BID    digoxin  62.5 mcg Oral Daily    DULoxetine  60 mg Oral Daily    [Held by provider] apixaban  5 mg Oral BID    famotidine  20 mg Oral Daily    furosemide  20 mg Oral Daily    levETIRAcetam  500 mg Oral BID    levothyroxine  50 mcg Oral Daily    metoprolol succinate  50 mg Oral BID    Polyvinyl Alcohol-Povidone PF  1 drop Both Eyes BID    potassium chloride  20 mEq Oral Daily    sildenafil  10 mg Oral TID    Vitamin D  1,000 Units Oral Daily    sodium chloride flush  5-40 mL IntraVENous 2 times per day

## 2024-01-30 NOTE — PROGRESS NOTES
[facility]  Family Medicine Attending    S: 68 y.o. female with PMH sarcoidosis, afib on eliquis, pulmonary hypertension, diabetes insipidus, hypothyroidism, hypertension, CKD stage 3a, and combined systolic/diastolic heart failure who presented with rectal pain and BRBPR.  Her hemoglobin is noted to be 9.9.  FOBT+.  Gen surg consulted. Plan for cscope 1/29/24.    1/27 pt confused. Takes off O2, and pulse ox drops significantly. Placed on 8 L NC (home baseline 6 L) with frequent reminder to keep O2 on. Concern pt's AMS is 2/2 opiates given last night and this AM.     1/28: more alert. C/o pain everywhere, chronic complaint for pt. H/o opiate dependence. Started medrol dosepak    1/29:  patient seemingly not aware of doctors trying to take care of her     1/30: Pt seen on pap.  Reports no complaints.     O: VS- Blood pressure 116/74, pulse 74, temperature 97.9 °F (36.6 °C), temperature source Temporal, resp. rate 19, height 1.6 m (5' 3\"), weight 57.9 kg (127 lb 11.2 oz), SpO2 96 %, not currently breastfeeding.    HEENT: somewhat drowsy, oriented x 1, on 5L NC on cpap, more alert today  Gen: NAD   HEENT: NCAT, PERRL, MMM  CV-RRR no M/R/G   Lungs-diminished bs b/l  ABD-soft nonttp no masses   Ext-no C/C; mild 1+ edema b/l    Impressions:  Rectal bleeding  Proctocolitis  Hemorrhoids  Chronic lung disease, not in acute exacerbation-sarcoidosis, COPD on 5-6 L O2 chronically  PAF on eliquis  Hypertension  Hypothyroidism  Chronic pain    Plan:     General surgery consulted. Cscope 1/29 showed diverticulosis.  Planned rectal prolapse repair on 1/31.   Imaging suggestive of proctocolitis-pt off of antibiotics  Steroid cream rectally  Eliquis on hold  Trend h/h    Hx of CHF, afib, copd-eliquis on hold. Elevated pbnp. Patient with volume overload on cxr. Had lasix yesterday, restarted home po lasix and given another dose today. Cpap overnight.    CKD3-Elevated Cr, Cr ok 1/29, will monitor    Delirium-Avoid

## 2024-01-30 NOTE — PROGRESS NOTES
educated on role of PT, importance of functional mobility during hospital stay, safety with functional mobility    Patient response to education:   Pt verbalized understanding Pt demonstrated skill Pt requires further education in this area   yes partial yes     ASSESSMENT:    Conditions Requiring Skilled Therapeutic Intervention:    [x]Decreased strength     []Decreased ROM  [x]Decreased functional mobility  [x]Decreased balance   [x]Decreased endurance   [x]Decreased posture  []Decreased sensation  []Decreased coordination   []Decreased vision  [x]Decreased safety awareness   [x]Increased pain       Comments:  Pt supine in bed upon entering, pt agreeable to participate. Pt instructed to transfer to EOB, completing transfer with assist of trunk. Pt sitting upright with good sitting balance. Pt with no c/o dizziness with position change. Pt then cued for hand placement and instructed to stand from EOB. Pt standing with poor balance with no AD. Pt assisted to bedside commode, as pt requested to urinate. Pt cued for positioning and hand placement. Pt assisted back to EOB after commode use, unable to tolerate ambulation attempt this date. Pt was transferred back to supine position at end of session. Pt positioned for comfort with all needs met and call bell in reach prior to exiting.    Treatment:  Patient practiced and was instructed in the following treatment:    Bed mobility training - pt given verbal and tactile cues to facilitate proper sequencing and safety during rolling and supine>sit as well as provided with physical assistance to complete task   Sitting EOB for >5 minutes for upright tolerance, postural awareness and BLE ROM  STS and pivot transfer training - pt educated on proper hand and foot placement, safety and sequencing, and use of verbal and tactile cues to safely complete sit<>stand and pivot transfers with physical assistance to complete task safely   Skilled positioning - Pt placed in the chair  Complexity PT evaluation 15232  [] Moderate Complexity PT evaluation 10791  [] High Complexity PT evaluation 70031  [] PT Re-evaluation 76379  [] Gait training 64976 -- minutes  [] Manual therapy 92049 -- minutes  [x] Therapeutic activities 17856 10 minutes  [] Therapeutic exercises 53848 -- minutes  [] Neuromuscular reeducation 27220 -- minutes     Cayetano Bedoya, PT, DPT  [id number]

## 2024-01-30 NOTE — PROGRESS NOTES
OCCUPATIONAL THERAPY INITIAL EVALUATION    [facility]  [address], [place]        Date:2024                                                  Patient Name: Rebekah Rodriguez    MRN: [id number]    : 1955    Room: [facility]74-A          Evaluating OT: Micheline Stratton OTR/L; [id number]       Referring Provider: Carlos A Bae MD     Specific Provider Orders/Date: OT Eval and Treat 24       Diagnosis: Rectal bleeding; Heart failure     Surgery: 24 COLONOSCOPY DIAGNOSTIC     Pertinent Medical History:  has a past medical history of A-fib ([facility]), Abnormal mammogram, Acute on chronic respiratory failure ([facility]), Anemia, Anemia due to chronic illness, Ankle fracture, left, 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[facility]), Backache, Benign hypertension, CHF (congestive heart failure) ([facility]), Chronic back pain, Chronic kidney disease, Chronic pain disorder, Chronic, continuous use of opioids, Compression fracture of thoracic vertebra ([facility]), COPD (chronic obstructive pulmonary disease) ([facility]), Debility, Deformity of ankle and foot, acquired, Depression, Diabetes insipidus ([facility]), Diverticulitis, Dry eyes, Encephalopathy acute, Gait disturbance, GERD (gastroesophageal reflux disease), Hemorrhoids, Hernia, History of blood transfusion, History of Clostridium difficile, Hyperlipidemia, Hyperthyroidism, Hypothyroidism, Ischemic colitis, enteritis, or enterocolitis ([facility]), Long term (current) use of systemic steroids, Long-term current use of steroids, Lumbar disc herniation, Myalgia and myositis, unspecified, Nephrosclerosis, Nonunion of fracture, Osteoarthritis, PAF (paroxysmal atrial fibrillation) ([facility]), 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[facility]), Pulmonary hypertension ([facility]), Pulmonary sarcoidosis ([facility]), Rectal bleeding, Rhabdomyolysis, Steroid-induced avascular necrosis of shoulder ([facility]), Tibia fracture, and Ventral hernia without obstruction or gangrene.

## 2024-01-30 NOTE — PLAN OF CARE
Problem: Discharge Planning  Goal: Discharge to home or other facility with appropriate resources  Outcome: Progressing     Problem: Pain  Goal: Verbalizes/displays adequate comfort level or baseline comfort level  Outcome: Progressing     Problem: ABCDS Injury Assessment  Goal: Absence of physical injury  Outcome: Progressing  Flowsheets (Taken 1/29/2024 2104)  Absence of Physical Injury: Implement safety measures based on patient assessment     Problem: Skin/Tissue Integrity  Goal: Absence of new skin breakdown  Description: 1.  Monitor for areas of redness and/or skin breakdown  2.  Assess vascular access sites hourly  3.  Every 4-6 hours minimum:  Change oxygen saturation probe site  4.  Every 4-6 hours:  If on nasal continuous positive airway pressure, respiratory therapy assess nares and determine need for appliance change or resting period.  Outcome: Progressing     Problem: Safety - Adult  Goal: Free from fall injury  Outcome: Progressing     Problem: Nutrition Deficit:  Goal: Optimize nutritional status  Outcome: Progressing

## 2024-01-30 NOTE — PROGRESS NOTES
Pt agreed to take nightly medications. Pt requesting sleep medication- nothing ordered. Resident messaged requesting sleep aid

## 2024-01-31 ENCOUNTER — ANESTHESIA EVENT (OUTPATIENT)
Dept: OPERATING ROOM | Age: 69
End: 2024-01-31
Payer: MEDICARE

## 2024-01-31 ENCOUNTER — ANESTHESIA (OUTPATIENT)
Dept: OPERATING ROOM | Age: 69
End: 2024-01-31
Payer: MEDICARE

## 2024-01-31 LAB
ABO + RH BLD: NORMAL
ALBUMIN SERPL-MCNC: 3.6 G/DL (ref 3.5–5.2)
ALP SERPL-CCNC: 104 U/L (ref 35–104)
ALT SERPL-CCNC: 10 U/L (ref 0–32)
ANION GAP SERPL CALCULATED.3IONS-SCNC: 13 MMOL/L (ref 7–16)
ARM BAND NUMBER: NORMAL
AST SERPL-CCNC: 17 U/L (ref 0–31)
BASOPHILS # BLD: 0.01 K/UL (ref 0–0.2)
BASOPHILS NFR BLD: 0 % (ref 0–2)
BILIRUB SERPL-MCNC: 0.3 MG/DL (ref 0–1.2)
BLOOD BANK SAMPLE EXPIRATION: NORMAL
BLOOD GROUP ANTIBODIES SERPL: NEGATIVE
BUN SERPL-MCNC: 22 MG/DL (ref 6–23)
CALCIUM SERPL-MCNC: 9.3 MG/DL (ref 8.6–10.2)
CHLAMYDIA DNA UR QL NAA+PROBE: ABNORMAL
CHLORIDE SERPL-SCNC: 100 MMOL/L (ref 98–107)
CO2 SERPL-SCNC: 32 MMOL/L (ref 22–29)
CREAT SERPL-MCNC: 1.2 MG/DL (ref 0.5–1)
EOSINOPHIL # BLD: 0.03 K/UL (ref 0.05–0.5)
EOSINOPHILS RELATIVE PERCENT: 1 % (ref 0–6)
ERYTHROCYTE [DISTWIDTH] IN BLOOD BY AUTOMATED COUNT: 16 % (ref 11.5–15)
GFR SERPL CREATININE-BSD FRML MDRD: 51 ML/MIN/1.73M2
GLUCOSE SERPL-MCNC: 69 MG/DL (ref 74–99)
HCT VFR BLD AUTO: 35.4 % (ref 34–48)
HGB BLD-MCNC: 10.6 G/DL (ref 11.5–15.5)
IMM GRANULOCYTES # BLD AUTO: <0.03 K/UL (ref 0–0.58)
IMM GRANULOCYTES NFR BLD: 1 % (ref 0–5)
INR PPP: 1.1
LYMPHOCYTES NFR BLD: 0.65 K/UL (ref 1.5–4)
LYMPHOCYTES RELATIVE PERCENT: 19 % (ref 20–42)
MCH RBC QN AUTO: 25.1 PG (ref 26–35)
MCHC RBC AUTO-ENTMCNC: 29.9 G/DL (ref 32–34.5)
MCV RBC AUTO: 83.9 FL (ref 80–99.9)
MONOCYTES NFR BLD: 0.38 K/UL (ref 0.1–0.95)
MONOCYTES NFR BLD: 11 % (ref 2–12)
N GONORRHOEA DNA UR QL NAA+PROBE: ABNORMAL
NEUTROPHILS NFR BLD: 69 % (ref 43–80)
NEUTS SEG NFR BLD: 2.39 K/UL (ref 1.8–7.3)
PLATELET # BLD AUTO: 222 K/UL (ref 130–450)
PMV BLD AUTO: 11.6 FL (ref 7–12)
POTASSIUM SERPL-SCNC: 4.1 MMOL/L (ref 3.5–5)
PROT SERPL-MCNC: 6.9 G/DL (ref 6.4–8.3)
PROTHROMBIN TIME: 12.4 SEC (ref 9.3–12.4)
RBC # BLD AUTO: 4.22 M/UL (ref 3.5–5.5)
SODIUM SERPL-SCNC: 145 MMOL/L (ref 132–146)
SPECIMEN DESCRIPTION: ABNORMAL
WBC OTHER # BLD: 3.5 K/UL (ref 4.5–11.5)

## 2024-01-31 PROCEDURE — 6370000000 HC RX 637 (ALT 250 FOR IP)

## 2024-01-31 PROCEDURE — 99223 1ST HOSP IP/OBS HIGH 75: CPT | Performed by: INTERNAL MEDICINE

## 2024-01-31 PROCEDURE — 99233 SBSQ HOSP IP/OBS HIGH 50: CPT | Performed by: SURGERY

## 2024-01-31 PROCEDURE — 80053 COMPREHEN METABOLIC PANEL: CPT

## 2024-01-31 PROCEDURE — 85610 PROTHROMBIN TIME: CPT

## 2024-01-31 PROCEDURE — 2580000003 HC RX 258: Performed by: SURGERY

## 2024-01-31 PROCEDURE — 6370000000 HC RX 637 (ALT 250 FOR IP): Performed by: SURGERY

## 2024-01-31 PROCEDURE — 2060000000 HC ICU INTERMEDIATE R&B

## 2024-01-31 PROCEDURE — 86850 RBC ANTIBODY SCREEN: CPT

## 2024-01-31 PROCEDURE — 99232 SBSQ HOSP IP/OBS MODERATE 35: CPT | Performed by: FAMILY MEDICINE

## 2024-01-31 PROCEDURE — 6360000002 HC RX W HCPCS: Performed by: SURGERY

## 2024-01-31 PROCEDURE — APPSS180 APP SPLIT SHARED TIME > 60 MINUTES: Performed by: NURSE PRACTITIONER

## 2024-01-31 PROCEDURE — 36415 COLL VENOUS BLD VENIPUNCTURE: CPT

## 2024-01-31 PROCEDURE — 94660 CPAP INITIATION&MGMT: CPT

## 2024-01-31 PROCEDURE — 85025 COMPLETE CBC W/AUTO DIFF WBC: CPT

## 2024-01-31 PROCEDURE — 86900 BLOOD TYPING SEROLOGIC ABO: CPT

## 2024-01-31 PROCEDURE — 94640 AIRWAY INHALATION TREATMENT: CPT

## 2024-01-31 PROCEDURE — 2700000000 HC OXYGEN THERAPY PER DAY

## 2024-01-31 PROCEDURE — 86901 BLOOD TYPING SEROLOGIC RH(D): CPT

## 2024-01-31 RX ADMIN — DIGOXIN 62.5 MCG: 125 TABLET ORAL at 09:16

## 2024-01-31 RX ADMIN — BUDESONIDE 500 MCG: 0.5 INHALANT RESPIRATORY (INHALATION) at 08:07

## 2024-01-31 RX ADMIN — SILDENAFIL 10 MG: 20 TABLET, FILM COATED ORAL at 14:27

## 2024-01-31 RX ADMIN — ARFORMOTEROL TARTRATE 15 MCG: 15 SOLUTION RESPIRATORY (INHALATION) at 20:54

## 2024-01-31 RX ADMIN — LEVETIRACETAM 500 MG: 500 TABLET, FILM COATED ORAL at 19:42

## 2024-01-31 RX ADMIN — AMLODIPINE BESYLATE 5 MG: 5 TABLET ORAL at 09:16

## 2024-01-31 RX ADMIN — BUDESONIDE 500 MCG: 0.5 INHALANT RESPIRATORY (INHALATION) at 20:54

## 2024-01-31 RX ADMIN — FUROSEMIDE 20 MG: 20 TABLET ORAL at 09:15

## 2024-01-31 RX ADMIN — POTASSIUM CHLORIDE 20 MEQ: 1500 TABLET, EXTENDED RELEASE ORAL at 09:15

## 2024-01-31 RX ADMIN — FAMOTIDINE 20 MG: 20 TABLET, FILM COATED ORAL at 09:13

## 2024-01-31 RX ADMIN — HYDROCORTISONE 20 MG: 20 TABLET ORAL at 09:14

## 2024-01-31 RX ADMIN — Medication 5 MG: at 19:42

## 2024-01-31 RX ADMIN — POLYVINYL ALCOHOL, POVIDONE 1 DROP: 14; 6 SOLUTION/ DROPS OPHTHALMIC at 09:16

## 2024-01-31 RX ADMIN — DESMOPRESSIN ACETATE 200 MCG: 0.1 TABLET ORAL at 09:16

## 2024-01-31 RX ADMIN — ARFORMOTEROL TARTRATE 15 MCG: 15 SOLUTION RESPIRATORY (INHALATION) at 08:06

## 2024-01-31 RX ADMIN — LEVETIRACETAM 500 MG: 500 TABLET, FILM COATED ORAL at 09:16

## 2024-01-31 RX ADMIN — HYDROCORTISONE: 1 CREAM TOPICAL at 09:21

## 2024-01-31 RX ADMIN — Medication 1000 UNITS: at 09:16

## 2024-01-31 RX ADMIN — Medication 10 ML: at 09:16

## 2024-01-31 RX ADMIN — DULOXETINE HYDROCHLORIDE 60 MG: 60 CAPSULE, DELAYED RELEASE ORAL at 09:16

## 2024-01-31 RX ADMIN — SILDENAFIL 10 MG: 20 TABLET, FILM COATED ORAL at 09:13

## 2024-01-31 RX ADMIN — ACETAMINOPHEN 325MG 650 MG: 325 TABLET ORAL at 16:12

## 2024-01-31 RX ADMIN — SILDENAFIL 10 MG: 20 TABLET, FILM COATED ORAL at 19:43

## 2024-01-31 RX ADMIN — Medication 10 ML: at 19:45

## 2024-01-31 RX ADMIN — METOPROLOL SUCCINATE 50 MG: 50 TABLET, EXTENDED RELEASE ORAL at 09:16

## 2024-01-31 RX ADMIN — HYDROCORTISONE: 1 CREAM TOPICAL at 19:45

## 2024-01-31 RX ADMIN — HYDROCORTISONE 20 MG: 20 TABLET ORAL at 19:46

## 2024-01-31 RX ADMIN — DESMOPRESSIN ACETATE 200 MCG: 0.1 TABLET ORAL at 19:42

## 2024-01-31 RX ADMIN — POLYVINYL ALCOHOL, POVIDONE 1 DROP: 14; 6 SOLUTION/ DROPS OPHTHALMIC at 19:44

## 2024-01-31 RX ADMIN — METOPROLOL SUCCINATE 50 MG: 50 TABLET, EXTENDED RELEASE ORAL at 19:43

## 2024-01-31 ASSESSMENT — PAIN SCALES - GENERAL
PAINLEVEL_OUTOF10: 0
PAINLEVEL_OUTOF10: 3
PAINLEVEL_OUTOF10: 0

## 2024-01-31 ASSESSMENT — PAIN DESCRIPTION - LOCATION: LOCATION: BACK

## 2024-01-31 NOTE — PROGRESS NOTES
Date: 1/30/2024    Time: 10:24 PM    Patient Placed On BIPAP/CPAP/ Non-Invasive Ventilation?  No    If no must comment.  Facial area red/color change? No           If YES are Blister/Lesion present?No   If yes must notify nursing staff  BIPAP/CPAP skin barrier?  No    Skin barrier type: The pt is refusing to wear the skin barrier.        Comments:        Goran Helm RCP

## 2024-01-31 NOTE — CONSULTS
INPATIENT CARDIOLOGY CONSULT     Reason for Consult: Preop risk stratification    Cardiologist: Dr. Garcia    Requesting Physician:  Dr. Dupree    Date of Consultation: 1/31/2024    HISTORY OF PRESENT ILLNESS:    67 yo AAM female known to Dr Garcia and also Cecilia LO (11/15/2023) for PAF.     PMH: PAF on Amiodarone, previously on Multaq in 2016 stopped in 2017, no DCCV, OAC with Eliquis, VHD, HTN, CKD stage 3, pulmonary sarcoidosis diagnosed in 2007, PAH WHO group 2 & 3 & 5  with predominant precapillary component,  hx RV failure, CKD, chronic hypoxic respiratory failure on chronic O2, LINDSAY complaint with C-pap, Diabetes insipidus on DDAVP, 10 pack year smoker quit in 1996, hypothyroidism on HRT, seizure in 5/2023 on Keppra, complicated UTI 9/2023, debility, and anxiety.     Recent hospitals admission on 12/30/2023 - 1/4/2024 for SOB.Management of A-fib per cardiology, amiodarone was stopped last admission and increase in metoprolol by EP cardiology.     Rebekah Rodriguez presented back to the ED 1/26/2024 with rectal pain, abdominal pain, and complaint of bloody stool that had been ongoing for a couple of days.  She underwent colonoscopy 1/29/2024 with findings of diverticulosis, rectal prolapse.  She is scheduled for rectosigmoidectomy per general surgery recommendations on 2/1/2024.  Rectal prolapse likely secondary to pelvic floor dysfunction.  Patient with chronic cough.  Eliquis remains on hold.     [facility] ED:1/26/2024 0232, via EMS for complaints of rectal bleeding.   VITALS: T97.3, RR 20, P67, /63, SpO2 95% on CPAP BMI 22.28.  1/31/2024 LABS: , K4.1, BUN 22, CR 1.2, (CR 1.5 on admission) GFR 51, normal GI profile, WBCs 3.5, Hgb 10.6, HCT 35.4, .  Pro-BNP: 6,464/8,143  Troponin:19  FOBT positive  CXR:patchy infiltrates and atelectasis in both lungs.  Admitted for CHF and rectal bleeding.   Er treatments: Lasix 20 mg IV, morphine 4 mg IV, zoframn 4 mg IV.     Seen and examined, recent  2 times per day, Carlos A Bae MD, 10 mL at 01/31/24 0916    sodium chloride flush 0.9 % injection 5-40 mL, 5-40 mL, IntraVENous, PRN, Carlos A Bae MD    0.9 % sodium chloride infusion, , IntraVENous, PRN, Carlos A Bae MD    ondansetron (ZOFRAN-ODT) disintegrating tablet 4 mg, 4 mg, Oral, Q8H PRN **OR** ondansetron (ZOFRAN) injection 4 mg, 4 mg, IntraVENous, Q6H PRN, Carlos A Bae MD    polyethylene glycol (GLYCOLAX) packet 17 g, 17 g, Oral, Daily PRN, Carlos A Bae MD    acetaminophen (TYLENOL) tablet 650 mg, 650 mg, Oral, Q6H PRN, 650 mg at 01/30/24 2013 **OR** acetaminophen (TYLENOL) suppository 650 mg, 650 mg, Rectal, Q6H PRN, Carlos A Bae MD    hydrocortisone 1 % cream, , Topical, BID, Carlos A Bae MD, Given at 01/31/24 0921    ALLERGIES:  Patient has no known allergies.    SOCIAL HISTORY:    12.5 ppd x 25 years  ETOH denies  Denies Illicit Drugs  Lives with  in 1 story home with basement. Laundry in basement. Mostly in bed,  brings her meals to the bedroom. Pivots to wheelchair. Needs help with ADL's. Uses shower chair.     FAMILY HISTORY:   Non-contributory at this time due to patient's advanced age.       REVIEW OF SYSTEMS:     Negative except as noted above in HPI.      PHYSICAL EXAM:   /76   Pulse 55   Temp 98.1 °F (36.7 °C)   Resp 18   Ht 1.6 m (5' 3\")   Wt 57.1 kg (125 lb 12.8 oz)   LMP  (LMP Unknown)   SpO2 98%   BMI 22.28 kg/m²   CONST:  Well developed, well nourished   female who appears stated age. Awake, alert, cooperative, no apparent distress.  HEENT:   Head- Normocephalic, atraumatic.   Eyes- Conjunctivae pink, anicteric.  Neck-  No stridor, trachea midline, no apparent jugular venous distention.   CHEST: Chest symmetrical and non-tender to palpation. No accessory muscle use or intercostal retractions.  RESPIRATORY: Lung sounds - clear throughout fields. No wheezing, rales or rhonchi. Diminished in GEOVANI lower lobes.   CARDIOVASCULAR:

## 2024-01-31 NOTE — PROGRESS NOTES
I had a long talk with Rebekah and her , Eric.  Discussed she has a very high risk of pulmonary and cardiac complications related to surgery and anesthesia.  We discussed her potential for cardiac complications, heart failure exacerbation, MI, we discussed possibility for prolonged intubation and possible vent dependant respiratory failure requiring a trach. We discussed the possibility of death.  Both patient and her  area aware and understand this and both still wish to proceed with surgery stating that she cannot go on like this.      We will try to reschedule surgery for this coming Friday.         Carlos A Bae MD

## 2024-01-31 NOTE — PLAN OF CARE
Problem: Discharge Planning  Goal: Discharge to home or other facility with appropriate resources  Outcome: Progressing     Problem: Pain  Goal: Verbalizes/displays adequate comfort level or baseline comfort level  Outcome: Progressing     Problem: ABCDS Injury Assessment  Goal: Absence of physical injury  Outcome: Progressing  Flowsheets (Taken 1/30/2024 8364)  Absence of Physical Injury: Implement safety measures based on patient assessment     Problem: Skin/Tissue Integrity  Goal: Absence of new skin breakdown  Description: 1.  Monitor for areas of redness and/or skin breakdown  2.  Assess vascular access sites hourly  3.  Every 4-6 hours minimum:  Change oxygen saturation probe site  4.  Every 4-6 hours:  If on nasal continuous positive airway pressure, respiratory therapy assess nares and determine need for appliance change or resting period.  Outcome: Progressing     Problem: Safety - Adult  Goal: Free from fall injury  Outcome: Progressing     Problem: Nutrition Deficit:  Goal: Optimize nutritional status  Outcome: Progressing     Problem: Chronic Conditions and Co-morbidities  Goal: Patient's chronic conditions and co-morbidity symptoms are monitored and maintained or improved  Outcome: Progressing

## 2024-01-31 NOTE — PROGRESS NOTES
[facility]  Family Medicine Attending    S: 68 y.o. female with PMH sarcoidosis, afib on eliquis, pulmonary hypertension, diabetes insipidus, hypothyroidism, hypertension, CKD stage 3a, and combined systolic/diastolic heart failure who presented with rectal pain and BRBPR.  Her hemoglobin is noted to be 9.9.  FOBT+.  Gen surg consulted. Plan for cscope 1/29/24.    1/27 pt confused. Takes off O2, and pulse ox drops significantly. Placed on 8 L NC (home baseline 6 L) with frequent reminder to keep O2 on. Concern pt's AMS is 2/2 opiates given last night and this AM.     1/28: more alert. C/o pain everywhere, chronic complaint for pt. H/o opiate dependence. Started medrol dosepak    1/29:  patient seemingly not aware of doctors trying to take care of her     1/30: Pt seen on pap.  Reports no complaints.     1/21: more alert , desires rectal surgery. Denies anal/rectal abuse    O: VS- Blood pressure 112/76, pulse 55, temperature 98.1 °F (36.7 °C), resp. rate 18, height 1.6 m (5' 3\"), weight 57.1 kg (125 lb 12.8 oz), SpO2 98 %, not currently breastfeeding.    HEENT: oriented x 2, on 6L NC,more alert today  Gen: NAD   HEENT: NCAT, PERRL, MMM  CV-RRR no M/R/G   Lungs-diminished bs b/l  ABD-soft nonttp no masses   Ext-no C/C; mild 1+ edema b/l    Impressions:  Rectal bleeding  Proctocolitis  Hemorrhoids  Chronic lung disease, not in acute exacerbation-sarcoidosis, COPD on 5-6 L O2 chronically  PAF on eliquis  Hypertension  Hypothyroidism  Chronic pain    Plan:     General surgery consulted. Cscope 1/29 showed diverticulosis.  Planned rectal prolapse repair on 2/2, pulm and cv consulted for preop mgmt  Imaging suggestive of proctocolitis-pt off of antibiotics  Steroid cream rectally  Eliquis on hold  Trend h/h    Hx of CHF, afib, copd-eliquis on hold. Elevated pbnp. Patient with volume overload on cxr. Had lasix yesterday, restarted home po lasix and given another dose today. Cpap overnight.    CKD3-Elevated

## 2024-01-31 NOTE — CONSULTS
(REFRESH) 1.4-0.6 % SOLN ophthalmic solution, Place 1 drop into both eyes in the morning and at bedtime  sildenafil (REVATIO) 20 MG tablet, Take 0.5 tablets by mouth 3 times daily  ELIQUIS 5 MG TABS tablet, Take 1 tablet by mouth 2 times daily  DULoxetine (CYMBALTA) 60 MG extended release capsule, Take 1 capsule by mouth daily  levothyroxine (SYNTHROID) 50 MCG tablet, Take 1 tablet by mouth daily  furosemide (LASIX) 20 MG tablet, Take 1 tablet by mouth daily  omega-3 acid ethyl esters (LOVAZA) 1 g capsule, Take 2 capsules by mouth 2 times daily  Multiple Vitamin (MULTI-DAY VITAMINS) TABS, Take 1 tablet by mouth daily  lidocaine (LIDODERM) 5 %, Place 1 patch onto the skin daily 12 hours on, 12 hours off.  hydrocortisone (CORTEF) 5 MG tablet, Take 1 tablet by mouth at bedtime  hydrocortisone (CORTEF) 5 MG tablet, Take 3 tablets by mouth every morning  vitamin D3 (CHOLECALCIFEROL) 25 MCG (1000 UT) TABS tablet, Take 1 tablet by mouth daily  famotidine (PEPCID) 20 MG tablet, Take 1 tablet by mouth daily  albuterol (PROVENTIL) (2.5 MG/3ML) 0.083% nebulizer solution, Take 3 mLs by nebulization every 6 hours as needed for Wheezing or Shortness of Breath  amLODIPine (NORVASC) 5 MG tablet, Take 1 tablet by mouth daily  desmopressin (DDAVP) 0.2 MG tablet, Take 1 tablet by mouth 2 times daily  levETIRAcetam (KEPPRA) 500 MG tablet, Take 1 tablet by mouth 2 times daily  OXYGEN, Inhale 4 L into the lungs continuous    CURRENT MEDS :  Scheduled Meds:   ceFAZolin  2,000 mg IntraVENous 60 Min Pre-Op    metroNIDAZOLE  500 mg IntraVENous 60 Min Pre-Op    hydrocortisone  20 mg Oral BID    melatonin  5 mg Oral Nightly    lidocaine  1 patch TransDERmal Daily    amLODIPine  5 mg Oral Daily    arformoterol tartrate  15 mcg Nebulization BID RT    budesonide  500 mcg Nebulization BID    desmopressin  200 mcg Oral BID    digoxin  62.5 mcg Oral Daily    DULoxetine  60 mg Oral Daily    [Held by provider] apixaban  5 mg Oral BID    famotidine  20  medications, early ambulation, and use of NIV if able to extubate until fully awake.  Patient with history of hypercapnia and altered mentation.  Thank you for allowing me to participate in the care of Rebekah Rodriguez.   Please feel free to call with questions.     This plan of care was reviewed in collaboration with Dr. Agarwal    Electronically signed by FRIDA Krishna - CNP on 1/31/2024 at 8:47 AM    Note: This report was completed utilizing computer voice recognition software. Every effort has been made to ensure accuracy, however; inadvertent computerized transcription errors may be present          I personally saw, examined and provided care for the patient. Radiographs, labs and medication list were reviewed by me independently.  Did he is very well-known to our service.  She has had multiple hospitalizations.  From a pulmonary standpoint her history includes) stage III severe pulmonary arterial hypertension she has stage IV pulmonary sarcoidosis diagnosed in 1998 also moderately severe COPD on chronic oxygen at home 6 L while at rest.  She also wears NIV at home.  She had a right heart catheterization and also started on sildenafil.    Overall she is at very high risk for postoperative pulm complications including but not limited to postoperative acute on chronic hypoxic hypercapnic respiratory failure requiring prolonged mechanical ventilation and respiratory arrest.  She does have very poor insight to the severity of her illness.  We have estimated in the past to discuss goals of care with family and they still wish her to be full code.     I spoke with bedside nursing, therapists and consultants.  The case was discussed in detail and plans for care were established. Review of CNP documentation was conducted and revisions were made as appropriate. I agree with the above documented exam, problem list and plan of care.     Joseph Agarwal MD

## 2024-01-31 NOTE — PROGRESS NOTES
SE - Family Medicine Inpatient   Resident Progress Note    S:  Hospital day: 5   Brief Synopsis: Rebekah Rodriguez is a 68 y.o. female  with a PMH of pulmonary sarcoidosis, atrial fibrillation on Eliquis, pulmonary hypertension, diabetes insipidus, hypothyroidism, hypertension, chronic kidney disease stage IIIa and combined systolic/diastolic heart failure who presents to ED for  rectal pain and BRBPR.  Patient had presented to ED with similar concerns on 01/23/26, CTAP performed at the time revealed findings concerning proctitis. Patient was prescribed Augmentin and discharged.  Sx persisted so she returned to the ED. Patient endorses a Hx of external hemorrhoids, and rectal prolapse. She states that her  will occasionally reduce the the rectal prolapse at home.  In the ED, her hb was stable 9.9, proBNP 6464 (above baseline). UA pos for trace ketones, protein. Digoxin level subtherapeutic. FOBT positive.general surgery consulted. Patient was given zofran, lasix 20 and morphine 2. Pt admitted for rectal bleeding.    Overnight:  No overnight events  Patient more alert and awake, responsive      Cont meds:    sodium chloride       Scheduled meds:    ceFAZolin  2,000 mg IntraVENous 60 Min Pre-Op    metroNIDAZOLE  500 mg IntraVENous 60 Min Pre-Op    hydrocortisone  20 mg Oral BID    melatonin  5 mg Oral Nightly    lidocaine  1 patch TransDERmal Daily    amLODIPine  5 mg Oral Daily    arformoterol tartrate  15 mcg Nebulization BID RT    budesonide  500 mcg Nebulization BID    desmopressin  200 mcg Oral BID    digoxin  62.5 mcg Oral Daily    DULoxetine  60 mg Oral Daily    [Held by provider] apixaban  5 mg Oral BID    famotidine  20 mg Oral Daily    furosemide  20 mg Oral Daily    levETIRAcetam  500 mg Oral BID    levothyroxine  50 mcg Oral Daily    metoprolol succinate  50 mg Oral BID    Polyvinyl Alcohol-Povidone PF  1 drop Both Eyes BID    potassium chloride  20 mEq Oral Daily    sildenafil  10 mg Oral TID

## 2024-01-31 NOTE — PROGRESS NOTES
GENERAL SURGERY  DAILY PROGRESS NOTE    Patient's Name/Date of Birth: Rebekah Rodriguez / 1955    Date: 2024     Chief Complaint   Patient presents with    Rectal Pain     Patient complaining of pain from her rectum.  States it has been going on for the past few days and no relief.        Subjective:  No clinical signs of bleeding overnight      Objective:  Last 24Hrs  Temp  Av.4 °F (36.3 °C)  Min: 97.3 °F (36.3 °C)  Max: 97.6 °F (36.4 °C)  Resp  Av.4  Min: 15  Max: 18  Pulse  Av.8  Min: 60  Max: 72  Systolic (24hrs), Av , Min:119 , Max:134     Diastolic (24hrs), Av, Min:7, Max:85    SpO2  Av %  Min: 96 %  Max: 100 %    I/O last 3 completed shifts:  In: 300 [P.O.:300]  Out: 1450 [Urine:1450]      General: In no acute distress, conversant, pleasantly confused.  On bipap overnight  Cardiovascular: Warm throughout, no edema  Respiratory: no respiratory distress, equal chest rise  Abdomen: soft,  nontender, nondistended  Skin: no obvious rashes or lesions appreciated, no jaundice  Extremities: atraumatic, no focal motor deficits, no open wounds      CBC  Recent Labs     24  0639 24  0641 24  0423   WBC 2.5* 4.5 3.5*   RBC 3.84 3.97 4.22   HGB 9.7* 10.2* 10.6*   HCT 33.1* 33.4* 35.4   MCV 86.2 84.1 83.9   MCH 25.3* 25.7* 25.1*   MCHC 29.3* 30.5* 29.9*   RDW 15.9* 16.3* 16.0*    228 222   MPV 11.9 11.3 11.6         CMP  Recent Labs     24  0639 24  0641 24  0423    143 145   K 5.1* 4.3 4.1   CL 99 103 100   CO2 25 30* 32*   BUN 21 26* 22   CREATININE 1.0 1.3* 1.2*   GLUCOSE 75 95 69*   CALCIUM 9.3 9.3 9.3   PROT 6.8 6.6 6.9   LABALBU 3.4* 3.5 3.6   BILITOT 0.3 0.2 0.3   ALKPHOS 107* 104 104   AST 24 21 17   ALT 10 11 10           Assessment/Plan:    Patient Active Problem List   Diagnosis    Chronic back pain    Hypothyroidism    Nephrosclerosis    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

## 2024-01-31 NOTE — PROGRESS NOTES
Anesthesiology Preoperative Evaluation    Discussed in detail with Dr. Bae and the patient.  Chart reviewed.    Multiple co-morbid conditions making general anesthesia very high risk.  Discussed this with patient who states she was unaware of the risk and angry that it wasn't discussed with her before.    Discussed with Dr. Bae the need for preoperative cardiology and pulmonary evaluation prior to general anesthesia in lithotomy.  Risks from severe pulmonary hypertension with right heart failure coupled with severe COPD need to be fully addressed with the patient and the family.    Will proceed once family is fully informed and if quality of life issues outweigh risks.    Dr. Harvey

## 2024-01-31 NOTE — CARE COORDINATION
1/31/24 Update CM note. Pt admitted 1/26/24 for heart failure, c/o rectal pain.  Pt was scheduled for rectosigmoidectomy, need cardiac and pulmonary clearance, consults pending. Discharge plan at this time is to return home ,  will provide transportation. Pt is active with [facility] has setup for 4L/NC and Bipap  Maribel HERRERA RN-BC  [phone]

## 2024-02-01 ENCOUNTER — ANESTHESIA EVENT (OUTPATIENT)
Dept: OPERATING ROOM | Age: 69
End: 2024-02-01
Payer: MEDICARE

## 2024-02-01 LAB
ALBUMIN SERPL-MCNC: 3.3 G/DL (ref 3.5–5.2)
ALP SERPL-CCNC: 96 U/L (ref 35–104)
ALT SERPL-CCNC: 10 U/L (ref 0–32)
ANION GAP SERPL CALCULATED.3IONS-SCNC: 12 MMOL/L (ref 7–16)
AST SERPL-CCNC: 17 U/L (ref 0–31)
BASOPHILS # BLD: 0.01 K/UL (ref 0–0.2)
BASOPHILS NFR BLD: 0 % (ref 0–2)
BILIRUB SERPL-MCNC: 0.3 MG/DL (ref 0–1.2)
BUN SERPL-MCNC: 23 MG/DL (ref 6–23)
CALCIUM SERPL-MCNC: 8.8 MG/DL (ref 8.6–10.2)
CHLORIDE SERPL-SCNC: 101 MMOL/L (ref 98–107)
CO2 SERPL-SCNC: 32 MMOL/L (ref 22–29)
CREAT SERPL-MCNC: 1.1 MG/DL (ref 0.5–1)
EOSINOPHIL # BLD: 0.16 K/UL (ref 0.05–0.5)
EOSINOPHILS RELATIVE PERCENT: 4 % (ref 0–6)
ERYTHROCYTE [DISTWIDTH] IN BLOOD BY AUTOMATED COUNT: 16.1 % (ref 11.5–15)
GFR SERPL CREATININE-BSD FRML MDRD: 54 ML/MIN/1.73M2
GLUCOSE SERPL-MCNC: 74 MG/DL (ref 74–99)
HCT VFR BLD AUTO: 36.1 % (ref 34–48)
HGB BLD-MCNC: 11 G/DL (ref 11.5–15.5)
IMM GRANULOCYTES # BLD AUTO: <0.03 K/UL (ref 0–0.58)
IMM GRANULOCYTES NFR BLD: 0 % (ref 0–5)
LYMPHOCYTES NFR BLD: 0.98 K/UL (ref 1.5–4)
LYMPHOCYTES RELATIVE PERCENT: 22 % (ref 20–42)
MCH RBC QN AUTO: 25.3 PG (ref 26–35)
MCHC RBC AUTO-ENTMCNC: 30.5 G/DL (ref 32–34.5)
MCV RBC AUTO: 83.2 FL (ref 80–99.9)
MONOCYTES NFR BLD: 0.56 K/UL (ref 0.1–0.95)
MONOCYTES NFR BLD: 13 % (ref 2–12)
NEUTROPHILS NFR BLD: 61 % (ref 43–80)
NEUTS SEG NFR BLD: 2.71 K/UL (ref 1.8–7.3)
PLATELET CONFIRMATION: NORMAL
PLATELET, FLUORESCENCE: 164 K/UL (ref 130–450)
PMV BLD AUTO: 12.6 FL (ref 7–12)
POTASSIUM SERPL-SCNC: 3.7 MMOL/L (ref 3.5–5)
PROT SERPL-MCNC: 6.4 G/DL (ref 6.4–8.3)
RBC # BLD AUTO: 4.34 M/UL (ref 3.5–5.5)
SODIUM SERPL-SCNC: 145 MMOL/L (ref 132–146)
WBC OTHER # BLD: 4.4 K/UL (ref 4.5–11.5)

## 2024-02-01 PROCEDURE — 94660 CPAP INITIATION&MGMT: CPT

## 2024-02-01 PROCEDURE — 6370000000 HC RX 637 (ALT 250 FOR IP)

## 2024-02-01 PROCEDURE — 99232 SBSQ HOSP IP/OBS MODERATE 35: CPT | Performed by: INTERNAL MEDICINE

## 2024-02-01 PROCEDURE — 2580000003 HC RX 258: Performed by: SURGERY

## 2024-02-01 PROCEDURE — 6360000002 HC RX W HCPCS: Performed by: SURGERY

## 2024-02-01 PROCEDURE — 36415 COLL VENOUS BLD VENIPUNCTURE: CPT

## 2024-02-01 PROCEDURE — 87591 N.GONORRHOEAE DNA AMP PROB: CPT

## 2024-02-01 PROCEDURE — 94640 AIRWAY INHALATION TREATMENT: CPT

## 2024-02-01 PROCEDURE — 99233 SBSQ HOSP IP/OBS HIGH 50: CPT | Performed by: SURGERY

## 2024-02-01 PROCEDURE — 2700000000 HC OXYGEN THERAPY PER DAY

## 2024-02-01 PROCEDURE — 6370000000 HC RX 637 (ALT 250 FOR IP): Performed by: SURGERY

## 2024-02-01 PROCEDURE — 2060000000 HC ICU INTERMEDIATE R&B

## 2024-02-01 PROCEDURE — 80053 COMPREHEN METABOLIC PANEL: CPT

## 2024-02-01 PROCEDURE — 85025 COMPLETE CBC W/AUTO DIFF WBC: CPT

## 2024-02-01 PROCEDURE — 87491 CHLMYD TRACH DNA AMP PROBE: CPT

## 2024-02-01 PROCEDURE — 99232 SBSQ HOSP IP/OBS MODERATE 35: CPT | Performed by: FAMILY MEDICINE

## 2024-02-01 RX ADMIN — BUDESONIDE 500 MCG: 0.5 INHALANT RESPIRATORY (INHALATION) at 07:49

## 2024-02-01 RX ADMIN — SILDENAFIL 10 MG: 20 TABLET, FILM COATED ORAL at 09:02

## 2024-02-01 RX ADMIN — POLYVINYL ALCOHOL, POVIDONE 1 DROP: 14; 6 SOLUTION/ DROPS OPHTHALMIC at 20:24

## 2024-02-01 RX ADMIN — DESMOPRESSIN ACETATE 200 MCG: 0.1 TABLET ORAL at 20:19

## 2024-02-01 RX ADMIN — FAMOTIDINE 20 MG: 20 TABLET, FILM COATED ORAL at 09:02

## 2024-02-01 RX ADMIN — ACETAMINOPHEN 325MG 650 MG: 325 TABLET ORAL at 14:49

## 2024-02-01 RX ADMIN — HYDROCORTISONE 20 MG: 20 TABLET ORAL at 09:02

## 2024-02-01 RX ADMIN — FUROSEMIDE 20 MG: 20 TABLET ORAL at 09:02

## 2024-02-01 RX ADMIN — ARFORMOTEROL TARTRATE 15 MCG: 15 SOLUTION RESPIRATORY (INHALATION) at 07:49

## 2024-02-01 RX ADMIN — Medication 5 MG: at 20:18

## 2024-02-01 RX ADMIN — DESMOPRESSIN ACETATE 200 MCG: 0.1 TABLET ORAL at 09:02

## 2024-02-01 RX ADMIN — ACETAMINOPHEN 325MG 650 MG: 325 TABLET ORAL at 06:43

## 2024-02-01 RX ADMIN — METOPROLOL SUCCINATE 50 MG: 50 TABLET, EXTENDED RELEASE ORAL at 09:02

## 2024-02-01 RX ADMIN — Medication 10 ML: at 09:03

## 2024-02-01 RX ADMIN — ACETAMINOPHEN 325MG 650 MG: 325 TABLET ORAL at 20:19

## 2024-02-01 RX ADMIN — HYDROCORTISONE: 1 CREAM TOPICAL at 20:25

## 2024-02-01 RX ADMIN — Medication 10 ML: at 20:25

## 2024-02-01 RX ADMIN — METOPROLOL SUCCINATE 50 MG: 50 TABLET, EXTENDED RELEASE ORAL at 20:19

## 2024-02-01 RX ADMIN — POLYVINYL ALCOHOL, POVIDONE 1 DROP: 14; 6 SOLUTION/ DROPS OPHTHALMIC at 09:02

## 2024-02-01 RX ADMIN — LEVETIRACETAM 500 MG: 500 TABLET, FILM COATED ORAL at 09:02

## 2024-02-01 RX ADMIN — DULOXETINE HYDROCHLORIDE 60 MG: 60 CAPSULE, DELAYED RELEASE ORAL at 09:02

## 2024-02-01 RX ADMIN — ARFORMOTEROL TARTRATE 15 MCG: 15 SOLUTION RESPIRATORY (INHALATION) at 19:25

## 2024-02-01 RX ADMIN — LEVOTHYROXINE SODIUM 50 MCG: 0.05 TABLET ORAL at 05:44

## 2024-02-01 RX ADMIN — AMLODIPINE BESYLATE 5 MG: 5 TABLET ORAL at 09:03

## 2024-02-01 RX ADMIN — POTASSIUM CHLORIDE 20 MEQ: 1500 TABLET, EXTENDED RELEASE ORAL at 09:02

## 2024-02-01 RX ADMIN — DIGOXIN 62.5 MCG: 125 TABLET ORAL at 09:08

## 2024-02-01 RX ADMIN — SILDENAFIL 10 MG: 20 TABLET, FILM COATED ORAL at 20:18

## 2024-02-01 RX ADMIN — Medication 1000 UNITS: at 09:02

## 2024-02-01 RX ADMIN — SILDENAFIL 10 MG: 20 TABLET, FILM COATED ORAL at 14:46

## 2024-02-01 RX ADMIN — LEVETIRACETAM 500 MG: 500 TABLET, FILM COATED ORAL at 20:19

## 2024-02-01 RX ADMIN — HYDROCORTISONE 20 MG: 20 TABLET ORAL at 20:18

## 2024-02-01 RX ADMIN — BUDESONIDE 500 MCG: 0.5 INHALANT RESPIRATORY (INHALATION) at 19:25

## 2024-02-01 ASSESSMENT — PAIN SCALES - GENERAL
PAINLEVEL_OUTOF10: 3
PAINLEVEL_OUTOF10: 0
PAINLEVEL_OUTOF10: 8
PAINLEVEL_OUTOF10: 2
PAINLEVEL_OUTOF10: 0

## 2024-02-01 ASSESSMENT — PAIN DESCRIPTION - ORIENTATION
ORIENTATION: MID;LOWER
ORIENTATION: LOWER;MID

## 2024-02-01 ASSESSMENT — PAIN - FUNCTIONAL ASSESSMENT: PAIN_FUNCTIONAL_ASSESSMENT: ACTIVITIES ARE NOT PREVENTED

## 2024-02-01 ASSESSMENT — PAIN DESCRIPTION - ONSET
ONSET: ON-GOING
ONSET: AWAKENED FROM SLEEP

## 2024-02-01 ASSESSMENT — PAIN DESCRIPTION - PAIN TYPE
TYPE: CHRONIC PAIN
TYPE: CHRONIC PAIN

## 2024-02-01 ASSESSMENT — PAIN DESCRIPTION - LOCATION
LOCATION: LEG
LOCATION: BACK
LOCATION: BACK

## 2024-02-01 ASSESSMENT — PAIN DESCRIPTION - DESCRIPTORS: DESCRIPTORS: DULL;DISCOMFORT

## 2024-02-01 ASSESSMENT — PAIN DESCRIPTION - FREQUENCY
FREQUENCY: CONTINUOUS
FREQUENCY: INTERMITTENT

## 2024-02-01 NOTE — PROGRESS NOTES
Cecilio Donovan M.D.,[facility]  Shaheed Agudelo D.O., F.GUANAKO.JIMMY., [facility]  Evans Agarwal M.D.  Abby Black M.D.   PEDRITO SotoO.        Daily Pulmonary Progress Note    Patient:  Rebekah Rodriguez 68 y.o. female MRN: [id number]     Date of Service: 2/1/2024      Synopsis     We are following patient for pre op pulmonary risk assessment    \"CC\" rectal bleeding    Code status: FULL      Subjective      Patient was seen and examined.  Lying in bed on BiPAP 14/8.  No complaints.  Tentative surgery scheduled for tomorrow 2-2-24.      Review of Systems:   Constitutional: Denies fever, weight loss, night sweats, and fatigue  Skin: Denies pigmentation, dark lesions, and rashes   HEENT: Denies hearing loss, tinnitus, ear drainage, epistaxis, sore throat, and hoarseness.  Cardiovascular: Denies palpitations, chest pain, and chest pressure.  Respiratory: Pulmonary sarcoidosis.  Chronic cough and dyspnea chronic O2 dependence history of recurrent pneumonias, severe pulmonary artery hypertension  Gastrointestinal: Denies nausea, vomiting, poor appetite, diarrhea, heartburn or reflux rectal pain, rectal bleeding, rectal prolapse, diverticulosis on colonoscopy  Genitourinary: Denies dysuria, frequency, urgency or hematuria  Musculoskeletal: Denies myalgias, muscle weakness, and bone pain  Neurological: Denies dizziness, vertigo, headache, and focal weakness  Psychological: Denies anxiety and depression  Endocrine: Denies heat intolerance and cold intolerance  Hematopoietic/Lymphatic: Denies bleeding problems and blood transfusions    24-hour events:  None    Objective   OBJECTIVE:   /72   Pulse 57   Temp 97.9 °F (36.6 °C)   Resp 17   Ht 1.6 m (5' 3\")   Wt 57.1 kg (125 lb 12.8 oz)   LMP  (LMP Unknown)   SpO2 100%   BMI 22.28 kg/m²   SpO2 Readings from Last 1 Encounters:   02/01/24 100%        I/O:    Intake/Output Summary (Last 24 hours) at 2/1/2024 1307  Last data filed at 2/1/2024 0921  Gross per 24
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
## 2024-02-01 NOTE — CARE COORDINATION
2/1/24  Update CM note. Pt admitted 1/26/24 for heart failure, c/o rectal pain.  Pt was scheduled for rectosigmoidectomy, pt still needs cardiac clearance, pulmonary clearance obtained.  Discharge plan was to return home [facility].  Pt already has [employer] for O2 and BiPAP. Discussion with pt and family re: possible need for SNF. Per ,  pt has been to many area facilities and only facility they would consider is [facility] in [place] ([phone]). Goal is to return home if possible pending postop eval and recovery.   Maribel HERRERAN RN-BC  [phone]

## 2024-02-01 NOTE — PROGRESS NOTES
[facility] - Family Medicine Inpatient   Resident Progress Note    S:  Hospital day: 6   Brief Synopsis: Rebekah Rodriguez is a 68 y.o. female  with a PMH of pulmonary sarcoidosis, atrial fibrillation on Eliquis, pulmonary hypertension, diabetes insipidus, hypothyroidism, hypertension, chronic kidney disease stage IIIa and combined systolic/diastolic heart failure who presents to ED for  rectal pain and BRBPR.  Patient had presented to ED with similar concerns on 01/23/26, CTAP performed at the time revealed findings concerning proctitis. Patient was prescribed Augmentin and discharged.  Sx persisted so she returned to the ED. Patient endorses a Hx of external hemorrhoids, and rectal prolapse. She states that her  will occasionally reduce the the rectal prolapse at home.  In the ED, her hb was stable 9.9, proBNP 6464 (above baseline). UA pos for trace ketones, protein. Digoxin level subtherapeutic. FOBT positive.general surgery consulted. Patient was given zofran, lasix 20 and morphine 2. Pt admitted for rectal bleeding.    Overnight:  No overnight events  Patient more alert and awake, responsive, following commands  Endorses abdominal pain and continued rectal pain. Denies F/SOB/CP/C/N/V      Cont meds:    sodium chloride       Scheduled meds:    ceFAZolin  2,000 mg IntraVENous 60 Min Pre-Op    metroNIDAZOLE  500 mg IntraVENous 60 Min Pre-Op    hydrocortisone  20 mg Oral BID    melatonin  5 mg Oral Nightly    lidocaine  1 patch TransDERmal Daily    amLODIPine  5 mg Oral Daily    arformoterol tartrate  15 mcg Nebulization BID RT    budesonide  500 mcg Nebulization BID    desmopressin  200 mcg Oral BID    digoxin  62.5 mcg Oral Daily    DULoxetine  60 mg Oral Daily    [Held by provider] apixaban  5 mg Oral BID    famotidine  20 mg Oral Daily    furosemide  20 mg Oral Daily    levETIRAcetam  500 mg Oral BID    levothyroxine  50 mcg Oral Daily    metoprolol succinate  50 mg Oral BID    Polyvinyl Alcohol-Povidone PF  1

## 2024-02-01 NOTE — PROGRESS NOTES
Physical Therapy  Physical Therapy Missed Visit    Name: Rebekah Rodriguez  : 1955  MRN: [id number]  Room #: 7401/7401-A    Date of Service: 2024    Attempted PT treatment. Pt declined to participate this date, preferring to rest. Encouragement provided, however, pt continued to decline. Will attempt again on later date as schedule permits.    Cayetano Bedoya, PT, DPT  [id number]

## 2024-02-01 NOTE — PROGRESS NOTES
Date: 1/31/2024    Time: 9:05 PM    Patient Placed On BIPAP/CPAP/ Non-Invasive Ventilation?  Yes    If no must comment.  Facial area red/color change? No           If YES are Blister/Lesion present?No   If yes must notify nursing staff  BIPAP/CPAP skin barrier?  Yes    Skin barrier type:mepilexlite       Comments:        Jenny Marrero RCP

## 2024-02-01 NOTE — PROGRESS NOTES
General Surgery Progress Note:    CC: rectal prolapse     S: doing ok, ready for surgery tomorrow.       Objective:  @/75   Pulse 65   Temp 97.9 °F (36.6 °C)   Resp 17   Ht 1.6 m (5' 3\")   Wt 57.1 kg (125 lb 12.8 oz)   LMP  (LMP Unknown)   SpO2 99%   BMI 22.28 kg/m² @    Physical -     Gen: NAD  Resp: non labored, on NC, resting,   CV: Reg Rate   Abd: large ventral hernia SNT   EXT nvi    Assessment/Plan: 69 yo with pulmonary HTN, CHF, pulmonary sarcoidosis who has rectal prolapse with life-style limiting pain and bleed complications.      Plan for OR tomorrow, consultant input appreciated. Discussed case with anesthesia, will do in lithotomy, epidural and MAC to attempt to mitigate the substantial risks give her co morbidities as she is a very high risk surgical candidate.  Pt and  are aware of this and in agreement with proceeding in spite of the risks.         Carlos A Bae MD FACS       9:36 AM

## 2024-02-01 NOTE — PROGRESS NOTES
INPATIENT CARDIOLOGY FOLLOW-UP    Name: Rebekah Rodriguez    Age: 68 y.o.    Date of Admission: 1/26/2024  2:34 AM    Date of Service: 2/1/2024    Chief Complaint: Follow-up for preop cardiac evaluation.    Interim History:  No new overnight cardiac complaints.  Patient complaining of left lower abdominal quadrant pain.  Currently with no complaints of CP, SOB, palpitations, dizziness, or lightheadedness. SR on telemetry.    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    Altered mental status    BRBPR (bright red blood per rectum)    Severe pulmonary hypertension (HCC)    Pulmonary hypertension    Chronic kidney disease, stage III (moderate) (HCC)    Rectal bleeding    Paroxysmal atrial fibrillation (HCC)    Mixed hyperlipidemia    Goals of care, counseling/discussion    Atrial fibrillation with RVR (HCC)    Chronic combined systolic and diastolic heart failure (HCC)    Chronic hypoxemic respiratory failure (HCC)    Mixed simple and mucopurulent chronic bronchitis (HCC)    Recurrent major depressive disorder, in partial remission (HCC)    Gait disturbance    Chest pain    Chronic, continuous use of opioids    PMB (postmenopausal bleeding)    Severe dysplasia of vagina    Epistaxis    Ventral hernia without obstruction or gangrene    Long term (current) use of systemic steroids    Nuclear

## 2024-02-01 NOTE — PLAN OF CARE
Problem: Discharge Planning  Goal: Discharge to home or other facility with appropriate resources  2/1/2024 1048 by Fatuma Espinoza RN  Outcome: Progressing  1/31/2024 2308 by Arsenio Brown RN  Outcome: Progressing     Problem: Pain  Goal: Verbalizes/displays adequate comfort level or baseline comfort level  2/1/2024 1048 by Fatuma Espinoza RN  Outcome: Progressing  1/31/2024 2308 by Arsenio Brown RN  Outcome: Progressing     Problem: ABCDS Injury Assessment  Goal: Absence of physical injury  2/1/2024 1048 by Fatuma Espinoza RN  Outcome: Progressing  1/31/2024 2308 by Arsenio Brown RN  Outcome: Progressing     Problem: Skin/Tissue Integrity  Goal: Absence of new skin breakdown  Description: 1.  Monitor for areas of redness and/or skin breakdown  2.  Assess vascular access sites hourly  3.  Every 4-6 hours minimum:  Change oxygen saturation probe site  4.  Every 4-6 hours:  If on nasal continuous positive airway pressure, respiratory therapy assess nares and determine need for appliance change or resting period.  2/1/2024 1048 by Fatuma Espinoza RN  Outcome: Progressing  1/31/2024 2308 by Arsenio Brown RN  Outcome: Progressing     Problem: Safety - Adult  Goal: Free from fall injury  Outcome: Progressing     Problem: Nutrition Deficit:  Goal: Optimize nutritional status  Outcome: Progressing     Problem: Chronic Conditions and Co-morbidities  Goal: Patient's chronic conditions and co-morbidity symptoms are monitored and maintained or improved  Outcome: Progressing

## 2024-02-02 ENCOUNTER — ANESTHESIA (OUTPATIENT)
Dept: OPERATING ROOM | Age: 69
End: 2024-02-02
Payer: MEDICARE

## 2024-02-02 LAB
ALBUMIN SERPL-MCNC: 3.1 G/DL (ref 3.5–5.2)
ALP SERPL-CCNC: 90 U/L (ref 35–104)
ALT SERPL-CCNC: 9 U/L (ref 0–32)
ANION GAP SERPL CALCULATED.3IONS-SCNC: 12 MMOL/L (ref 7–16)
AST SERPL-CCNC: 14 U/L (ref 0–31)
BASOPHILS # BLD: 0.01 K/UL (ref 0–0.2)
BASOPHILS NFR BLD: 0 % (ref 0–2)
BILIRUB SERPL-MCNC: 0.4 MG/DL (ref 0–1.2)
BUN SERPL-MCNC: 27 MG/DL (ref 6–23)
CALCIUM SERPL-MCNC: 9.2 MG/DL (ref 8.6–10.2)
CHLAMYDIA DNA UR QL NAA+PROBE: NEGATIVE
CHLORIDE SERPL-SCNC: 103 MMOL/L (ref 98–107)
CO2 SERPL-SCNC: 28 MMOL/L (ref 22–29)
CREAT SERPL-MCNC: 1.2 MG/DL (ref 0.5–1)
EOSINOPHIL # BLD: 0.09 K/UL (ref 0.05–0.5)
EOSINOPHILS RELATIVE PERCENT: 1 % (ref 0–6)
ERYTHROCYTE [DISTWIDTH] IN BLOOD BY AUTOMATED COUNT: 16.1 % (ref 11.5–15)
GFR SERPL CREATININE-BSD FRML MDRD: 50 ML/MIN/1.73M2
GLUCOSE SERPL-MCNC: 84 MG/DL (ref 74–99)
HCT VFR BLD AUTO: 35.8 % (ref 34–48)
HGB BLD-MCNC: 10.7 G/DL (ref 11.5–15.5)
IMM GRANULOCYTES # BLD AUTO: 0.05 K/UL (ref 0–0.58)
IMM GRANULOCYTES NFR BLD: 1 % (ref 0–5)
LYMPHOCYTES NFR BLD: 0.82 K/UL (ref 1.5–4)
LYMPHOCYTES RELATIVE PERCENT: 9 % (ref 20–42)
MCH RBC QN AUTO: 25.1 PG (ref 26–35)
MCHC RBC AUTO-ENTMCNC: 29.9 G/DL (ref 32–34.5)
MCV RBC AUTO: 83.8 FL (ref 80–99.9)
MONOCYTES NFR BLD: 0.56 K/UL (ref 0.1–0.95)
MONOCYTES NFR BLD: 6 % (ref 2–12)
N GONORRHOEA DNA UR QL NAA+PROBE: NEGATIVE
NEUTROPHILS NFR BLD: 84 % (ref 43–80)
NEUTS SEG NFR BLD: 7.89 K/UL (ref 1.8–7.3)
PLATELET # BLD AUTO: 190 K/UL (ref 130–450)
PMV BLD AUTO: 10.7 FL (ref 7–12)
POTASSIUM SERPL-SCNC: 3.9 MMOL/L (ref 3.5–5)
PROT SERPL-MCNC: 6.2 G/DL (ref 6.4–8.3)
RBC # BLD AUTO: 4.27 M/UL (ref 3.5–5.5)
SODIUM SERPL-SCNC: 143 MMOL/L (ref 132–146)
SPECIMEN DESCRIPTION: NORMAL
WBC OTHER # BLD: 9.4 K/UL (ref 4.5–11.5)

## 2024-02-02 PROCEDURE — 2580000003 HC RX 258: Performed by: SURGERY

## 2024-02-02 PROCEDURE — 2500000003 HC RX 250 WO HCPCS: Performed by: SURGERY

## 2024-02-02 PROCEDURE — 2709999900 HC NON-CHARGEABLE SUPPLY: Performed by: SURGERY

## 2024-02-02 PROCEDURE — 6360000002 HC RX W HCPCS: Performed by: SURGERY

## 2024-02-02 PROCEDURE — 3600000017 HC SURGERY HYBRID ADDL 15MIN: Performed by: SURGERY

## 2024-02-02 PROCEDURE — 88307 TISSUE EXAM BY PATHOLOGIST: CPT

## 2024-02-02 PROCEDURE — 3600000007 HC SURGERY HYBRID BASE: Performed by: SURGERY

## 2024-02-02 PROCEDURE — 6360000002 HC RX W HCPCS

## 2024-02-02 PROCEDURE — 94640 AIRWAY INHALATION TREATMENT: CPT

## 2024-02-02 PROCEDURE — 6370000000 HC RX 637 (ALT 250 FOR IP): Performed by: SURGERY

## 2024-02-02 PROCEDURE — 85025 COMPLETE CBC W/AUTO DIFF WBC: CPT

## 2024-02-02 PROCEDURE — 94660 CPAP INITIATION&MGMT: CPT

## 2024-02-02 PROCEDURE — 2720000010 HC SURG SUPPLY STERILE: Performed by: SURGERY

## 2024-02-02 PROCEDURE — 2580000003 HC RX 258

## 2024-02-02 PROCEDURE — 7100000001 HC PACU RECOVERY - ADDTL 15 MIN: Performed by: SURGERY

## 2024-02-02 PROCEDURE — 2700000000 HC OXYGEN THERAPY PER DAY

## 2024-02-02 PROCEDURE — 2060000000 HC ICU INTERMEDIATE R&B

## 2024-02-02 PROCEDURE — 80053 COMPREHEN METABOLIC PANEL: CPT

## 2024-02-02 PROCEDURE — 3700000000 HC ANESTHESIA ATTENDED CARE: Performed by: SURGERY

## 2024-02-02 PROCEDURE — 36415 COLL VENOUS BLD VENIPUNCTURE: CPT

## 2024-02-02 PROCEDURE — 7100000000 HC PACU RECOVERY - FIRST 15 MIN: Performed by: SURGERY

## 2024-02-02 PROCEDURE — 45130 EXCISION OF RECTAL PROLAPSE: CPT | Performed by: SURGERY

## 2024-02-02 PROCEDURE — 99232 SBSQ HOSP IP/OBS MODERATE 35: CPT | Performed by: INTERNAL MEDICINE

## 2024-02-02 PROCEDURE — 99232 SBSQ HOSP IP/OBS MODERATE 35: CPT | Performed by: FAMILY MEDICINE

## 2024-02-02 PROCEDURE — 6360000002 HC RX W HCPCS: Performed by: ANESTHESIOLOGY

## 2024-02-02 PROCEDURE — 0DBP7ZZ EXCISION OF RECTUM, VIA NATURAL OR ARTIFICIAL OPENING: ICD-10-PCS | Performed by: FAMILY MEDICINE

## 2024-02-02 PROCEDURE — 3700000001 HC ADD 15 MINUTES (ANESTHESIA): Performed by: SURGERY

## 2024-02-02 RX ORDER — MIDAZOLAM HYDROCHLORIDE 1 MG/ML
INJECTION INTRAMUSCULAR; INTRAVENOUS
Status: COMPLETED
Start: 2024-02-02 | End: 2024-02-02

## 2024-02-02 RX ORDER — BUPIVACAINE HYDROCHLORIDE 7.5 MG/ML
INJECTION, SOLUTION EPIDURAL; RETROBULBAR
Status: COMPLETED | OUTPATIENT
Start: 2024-02-02 | End: 2024-02-02

## 2024-02-02 RX ORDER — LIDOCAINE HYDROCHLORIDE 20 MG/ML
JELLY TOPICAL PRN
Status: DISCONTINUED | OUTPATIENT
Start: 2024-02-02 | End: 2024-02-02 | Stop reason: ALTCHOICE

## 2024-02-02 RX ORDER — ACETAMINOPHEN 325 MG/1
650 TABLET ORAL EVERY 6 HOURS SCHEDULED
Status: DISCONTINUED | OUTPATIENT
Start: 2024-02-02 | End: 2024-02-05 | Stop reason: HOSPADM

## 2024-02-02 RX ORDER — MIDAZOLAM HYDROCHLORIDE 2 MG/2ML
1 INJECTION, SOLUTION INTRAMUSCULAR; INTRAVENOUS
Status: DISCONTINUED | OUTPATIENT
Start: 2024-02-02 | End: 2024-02-02 | Stop reason: HOSPADM

## 2024-02-02 RX ORDER — HYDRALAZINE HYDROCHLORIDE 20 MG/ML
5 INJECTION INTRAMUSCULAR; INTRAVENOUS
Status: DISCONTINUED | OUTPATIENT
Start: 2024-02-02 | End: 2024-02-02 | Stop reason: HOSPADM

## 2024-02-02 RX ORDER — MIDAZOLAM HYDROCHLORIDE 1 MG/ML
INJECTION INTRAMUSCULAR; INTRAVENOUS PRN
Status: DISCONTINUED | OUTPATIENT
Start: 2024-02-02 | End: 2024-02-02 | Stop reason: SDUPTHER

## 2024-02-02 RX ORDER — SODIUM CHLORIDE 0.9 % (FLUSH) 0.9 %
5-40 SYRINGE (ML) INJECTION PRN
Status: DISCONTINUED | OUTPATIENT
Start: 2024-02-02 | End: 2024-02-02 | Stop reason: HOSPADM

## 2024-02-02 RX ORDER — METRONIDAZOLE 500 MG/100ML
INJECTION, SOLUTION INTRAVENOUS PRN
Status: DISCONTINUED | OUTPATIENT
Start: 2024-02-02 | End: 2024-02-02 | Stop reason: SDUPTHER

## 2024-02-02 RX ORDER — ACETAMINOPHEN 325 MG/1
650 TABLET ORAL
Status: DISCONTINUED | OUTPATIENT
Start: 2024-02-02 | End: 2024-02-02 | Stop reason: HOSPADM

## 2024-02-02 RX ORDER — SODIUM CHLORIDE 0.9 % (FLUSH) 0.9 %
5-40 SYRINGE (ML) INJECTION EVERY 12 HOURS SCHEDULED
Status: DISCONTINUED | OUTPATIENT
Start: 2024-02-02 | End: 2024-02-02 | Stop reason: HOSPADM

## 2024-02-02 RX ORDER — ONDANSETRON 2 MG/ML
4 INJECTION INTRAMUSCULAR; INTRAVENOUS
Status: DISCONTINUED | OUTPATIENT
Start: 2024-02-02 | End: 2024-02-02 | Stop reason: HOSPADM

## 2024-02-02 RX ORDER — LABETALOL HYDROCHLORIDE 5 MG/ML
5 INJECTION, SOLUTION INTRAVENOUS
Status: DISCONTINUED | OUTPATIENT
Start: 2024-02-02 | End: 2024-02-02 | Stop reason: HOSPADM

## 2024-02-02 RX ORDER — SODIUM CHLORIDE 9 MG/ML
INJECTION, SOLUTION INTRAVENOUS PRN
Status: DISCONTINUED | OUTPATIENT
Start: 2024-02-02 | End: 2024-02-02 | Stop reason: HOSPADM

## 2024-02-02 RX ORDER — DIBUCAINE 0.28 G/28G
OINTMENT TOPICAL 3 TIMES DAILY
Status: DISCONTINUED | OUTPATIENT
Start: 2024-02-02 | End: 2024-02-05 | Stop reason: HOSPADM

## 2024-02-02 RX ORDER — IPRATROPIUM BROMIDE AND ALBUTEROL SULFATE 2.5; .5 MG/3ML; MG/3ML
1 SOLUTION RESPIRATORY (INHALATION)
Status: DISCONTINUED | OUTPATIENT
Start: 2024-02-02 | End: 2024-02-02 | Stop reason: HOSPADM

## 2024-02-02 RX ORDER — FENTANYL CITRATE 50 UG/ML
INJECTION, SOLUTION INTRAMUSCULAR; INTRAVENOUS PRN
Status: DISCONTINUED | OUTPATIENT
Start: 2024-02-02 | End: 2024-02-02 | Stop reason: SDUPTHER

## 2024-02-02 RX ORDER — DIPHENHYDRAMINE HYDROCHLORIDE 50 MG/ML
12.5 INJECTION INTRAMUSCULAR; INTRAVENOUS
Status: DISCONTINUED | OUTPATIENT
Start: 2024-02-02 | End: 2024-02-02 | Stop reason: HOSPADM

## 2024-02-02 RX ORDER — HYDRALAZINE HYDROCHLORIDE 20 MG/ML
INJECTION INTRAMUSCULAR; INTRAVENOUS PRN
Status: DISCONTINUED | OUTPATIENT
Start: 2024-02-02 | End: 2024-02-02 | Stop reason: SDUPTHER

## 2024-02-02 RX ORDER — MORPHINE SULFATE 2 MG/ML
2 INJECTION, SOLUTION INTRAMUSCULAR; INTRAVENOUS
Status: DISCONTINUED | OUTPATIENT
Start: 2024-02-02 | End: 2024-02-05 | Stop reason: HOSPADM

## 2024-02-02 RX ORDER — SODIUM CHLORIDE 9 MG/ML
INJECTION, SOLUTION INTRAVENOUS CONTINUOUS PRN
Status: DISCONTINUED | OUTPATIENT
Start: 2024-02-02 | End: 2024-02-02 | Stop reason: SDUPTHER

## 2024-02-02 RX ORDER — OXYCODONE HYDROCHLORIDE 5 MG/1
2.5 TABLET ORAL EVERY 4 HOURS PRN
Status: DISCONTINUED | OUTPATIENT
Start: 2024-02-02 | End: 2024-02-05 | Stop reason: HOSPADM

## 2024-02-02 RX ADMIN — MIDAZOLAM 0.5 MG: 1 INJECTION INTRAMUSCULAR; INTRAVENOUS at 07:35

## 2024-02-02 RX ADMIN — Medication 1000 UNITS: at 11:12

## 2024-02-02 RX ADMIN — DESMOPRESSIN ACETATE 200 MCG: 0.1 TABLET ORAL at 20:35

## 2024-02-02 RX ADMIN — DULOXETINE HYDROCHLORIDE 60 MG: 60 CAPSULE, DELAYED RELEASE ORAL at 11:13

## 2024-02-02 RX ADMIN — SODIUM CHLORIDE: 9 INJECTION, SOLUTION INTRAVENOUS at 06:51

## 2024-02-02 RX ADMIN — AMLODIPINE BESYLATE 5 MG: 5 TABLET ORAL at 11:13

## 2024-02-02 RX ADMIN — OXYCODONE 2.5 MG: 5 TABLET ORAL at 15:30

## 2024-02-02 RX ADMIN — CEFAZOLIN 2000 MG: 2 INJECTION, POWDER, FOR SOLUTION INTRAMUSCULAR; INTRAVENOUS at 07:14

## 2024-02-02 RX ADMIN — DESMOPRESSIN ACETATE 200 MCG: 0.1 TABLET ORAL at 11:13

## 2024-02-02 RX ADMIN — FAMOTIDINE 20 MG: 20 TABLET, FILM COATED ORAL at 11:13

## 2024-02-02 RX ADMIN — POLYVINYL ALCOHOL, POVIDONE 1 DROP: 14; 6 SOLUTION/ DROPS OPHTHALMIC at 11:13

## 2024-02-02 RX ADMIN — OXYCODONE 2.5 MG: 5 TABLET ORAL at 11:14

## 2024-02-02 RX ADMIN — MIDAZOLAM 1 MG: 1 INJECTION INTRAMUSCULAR; INTRAVENOUS at 07:14

## 2024-02-02 RX ADMIN — Medication 5 MG: at 20:35

## 2024-02-02 RX ADMIN — ARFORMOTEROL TARTRATE 15 MCG: 15 SOLUTION RESPIRATORY (INHALATION) at 21:01

## 2024-02-02 RX ADMIN — BUDESONIDE 500 MCG: 0.5 INHALANT RESPIRATORY (INHALATION) at 12:34

## 2024-02-02 RX ADMIN — METOPROLOL SUCCINATE 50 MG: 50 TABLET, EXTENDED RELEASE ORAL at 11:12

## 2024-02-02 RX ADMIN — MORPHINE SULFATE 2 MG: 2 INJECTION, SOLUTION INTRAMUSCULAR; INTRAVENOUS at 12:43

## 2024-02-02 RX ADMIN — Medication 10 ML: at 11:18

## 2024-02-02 RX ADMIN — BUPIVACAINE HYDROCHLORIDE 5 MG: 7.5 INJECTION, SOLUTION EPIDURAL; RETROBULBAR at 07:00

## 2024-02-02 RX ADMIN — DIGOXIN 62.5 MCG: 125 TABLET ORAL at 11:12

## 2024-02-02 RX ADMIN — FENTANYL CITRATE 25 MCG: 50 INJECTION, SOLUTION INTRAMUSCULAR; INTRAVENOUS at 08:11

## 2024-02-02 RX ADMIN — FENTANYL CITRATE 25 MCG: 50 INJECTION, SOLUTION INTRAMUSCULAR; INTRAVENOUS at 07:59

## 2024-02-02 RX ADMIN — HYDRALAZINE HYDROCHLORIDE 5 MG: 20 INJECTION INTRAMUSCULAR; INTRAVENOUS at 08:15

## 2024-02-02 RX ADMIN — Medication 10 ML: at 22:11

## 2024-02-02 RX ADMIN — MORPHINE SULFATE 2 MG: 2 INJECTION, SOLUTION INTRAMUSCULAR; INTRAVENOUS at 16:31

## 2024-02-02 RX ADMIN — HYDRALAZINE HYDROCHLORIDE 5 MG: 20 INJECTION INTRAMUSCULAR; INTRAVENOUS at 08:34

## 2024-02-02 RX ADMIN — FUROSEMIDE 20 MG: 20 TABLET ORAL at 11:12

## 2024-02-02 RX ADMIN — SILDENAFIL 10 MG: 20 TABLET, FILM COATED ORAL at 14:33

## 2024-02-02 RX ADMIN — BUDESONIDE 500 MCG: 0.5 INHALANT RESPIRATORY (INHALATION) at 21:01

## 2024-02-02 RX ADMIN — HYDROCORTISONE 20 MG: 20 TABLET ORAL at 20:35

## 2024-02-02 RX ADMIN — POTASSIUM CHLORIDE 20 MEQ: 1500 TABLET, EXTENDED RELEASE ORAL at 11:12

## 2024-02-02 RX ADMIN — FENTANYL CITRATE 50 MCG: 50 INJECTION, SOLUTION INTRAMUSCULAR; INTRAVENOUS at 08:57

## 2024-02-02 RX ADMIN — MIDAZOLAM 1 MG: 1 INJECTION INTRAMUSCULAR; INTRAVENOUS at 09:01

## 2024-02-02 RX ADMIN — HYDROCORTISONE 20 MG: 20 TABLET ORAL at 11:14

## 2024-02-02 RX ADMIN — LEVETIRACETAM 500 MG: 500 TABLET, FILM COATED ORAL at 11:12

## 2024-02-02 RX ADMIN — METRONIDAZOLE 500 MG: 500 SOLUTION INTRAVENOUS at 07:35

## 2024-02-02 RX ADMIN — LEVETIRACETAM 500 MG: 500 TABLET, FILM COATED ORAL at 20:35

## 2024-02-02 RX ADMIN — MIDAZOLAM 0.5 MG: 1 INJECTION INTRAMUSCULAR; INTRAVENOUS at 07:29

## 2024-02-02 RX ADMIN — ARFORMOTEROL TARTRATE 15 MCG: 15 SOLUTION RESPIRATORY (INHALATION) at 12:34

## 2024-02-02 RX ADMIN — POLYVINYL ALCOHOL, POVIDONE 1 DROP: 14; 6 SOLUTION/ DROPS OPHTHALMIC at 20:37

## 2024-02-02 ASSESSMENT — PAIN - FUNCTIONAL ASSESSMENT
PAIN_FUNCTIONAL_ASSESSMENT: PREVENTS OR INTERFERES WITH MANY ACTIVE NOT PASSIVE ACTIVITIES
PAIN_FUNCTIONAL_ASSESSMENT: NONE - DENIES PAIN

## 2024-02-02 ASSESSMENT — PAIN DESCRIPTION - ORIENTATION
ORIENTATION: LOWER
ORIENTATION: ANTERIOR;LOWER
ORIENTATION: LOWER

## 2024-02-02 ASSESSMENT — PAIN DESCRIPTION - LOCATION
LOCATION: ABDOMEN
LOCATION: BUTTOCKS;BACK
LOCATION: BACK
LOCATION: BACK;BUTTOCKS

## 2024-02-02 ASSESSMENT — PAIN SCALES - GENERAL
PAINLEVEL_OUTOF10: 7
PAINLEVEL_OUTOF10: 8
PAINLEVEL_OUTOF10: 6
PAINLEVEL_OUTOF10: 8
PAINLEVEL_OUTOF10: 0
PAINLEVEL_OUTOF10: 10

## 2024-02-02 ASSESSMENT — PAIN DESCRIPTION - DESCRIPTORS
DESCRIPTORS: ACHING;CRAMPING;DISCOMFORT

## 2024-02-02 NOTE — PROGRESS NOTES
INPATIENT CARDIOLOGY FOLLOW-UP    Name: Rebekah Rodriguez    Age: 68 y.o.    Date of Admission: 1/26/2024  2:34 AM    Date of Service: 2/2/2024    Chief Complaint: Follow-up for preop cardiac evaluation.    Interim History:  No new overnight cardiac complaints.  Patient underwent surgery for rectal prolapse this morning without any perioperative cardiac events.  Patient was extubated and currently on BiPAP.  Patient is awake alert and complaining of severe pain in the rectal area requesting pain medication.  Currently with no complaints of CP, SOB, palpitations, dizziness, or lightheadedness.  Atrial fibrillation on telemetry.    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    Altered mental status    BRBPR (bright red blood per rectum)    Severe pulmonary hypertension (HCC)    Pulmonary hypertension    Chronic kidney disease, stage III (moderate) (HCC)    Rectal bleeding    Paroxysmal atrial fibrillation (HCC)    Mixed hyperlipidemia    Goals of care, counseling/discussion    Atrial fibrillation with RVR (HCC)    Chronic combined systolic and diastolic heart failure (HCC)    Chronic hypoxemic respiratory failure (HCC)    Mixed simple and mucopurulent chronic bronchitis (HCC)    Recurrent major depressive disorder, in partial remission (HCC)    Gait disturbance    Chest pain    Chronic,

## 2024-02-02 NOTE — PROGRESS NOTES
[facility] - Family Medicine Inpatient   Resident Progress Note    S:  Hospital day: 7   Brief Synopsis: Rebekah Rodriguez is a 68 y.o. female  with a PMH of pulmonary sarcoidosis, atrial fibrillation on Eliquis, pulmonary hypertension, diabetes insipidus, hypothyroidism, hypertension, chronic kidney disease stage IIIa and combined systolic/diastolic heart failure who presents to ED for  rectal pain and BRBPR.  Patient had presented to ED with similar concerns on 01/23/26, CTAP performed at the time revealed findings concerning proctitis. Patient was prescribed Augmentin and discharged.  Sx persisted so she returned to the ED. Patient endorses a Hx of external hemorrhoids, and rectal prolapse. She states that her  will occasionally reduce the the rectal prolapse at home.  In the ED, her hb was stable 9.9, proBNP 6464 (above baseline). UA pos for trace ketones, protein. Digoxin level subtherapeutic. FOBT positive.general surgery consulted. Patient was given zofran, lasix 20 and morphine 2. Pt admitted for rectal bleeding.    Overnight:  No overnight events      Cont meds:    sodium chloride       Scheduled meds:    ceFAZolin  2,000 mg IntraVENous 60 Min Pre-Op    metroNIDAZOLE  500 mg IntraVENous 60 Min Pre-Op    hydrocortisone  20 mg Oral BID    melatonin  5 mg Oral Nightly    lidocaine  1 patch TransDERmal Daily    amLODIPine  5 mg Oral Daily    arformoterol tartrate  15 mcg Nebulization BID RT    budesonide  500 mcg Nebulization BID    desmopressin  200 mcg Oral BID    digoxin  62.5 mcg Oral Daily    DULoxetine  60 mg Oral Daily    [Held by provider] apixaban  5 mg Oral BID    famotidine  20 mg Oral Daily    furosemide  20 mg Oral Daily    levETIRAcetam  500 mg Oral BID    levothyroxine  50 mcg Oral Daily    metoprolol succinate  50 mg Oral BID    Polyvinyl Alcohol-Povidone PF  1 drop Both Eyes BID    potassium chloride  20 mEq Oral Daily    sildenafil  10 mg Oral TID    Vitamin D  1,000 Units Oral Daily    sodium  pt is [employer] beliefs
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
## 2024-02-02 NOTE — ANESTHESIA PROCEDURE NOTES
Spinal Block    Patient location during procedure: OR  End time: 2/2/2024 7:10 AM  Reason for block: primary anesthetic  Staffing  Performed: anesthesiologist   Anesthesiologist: Keena Harvey MD  Performed by: Keena Harvey MD  Authorized by: Keena Harvey MD    Spinal Block  Patient position: sitting  Prep: ChloraPrep  Patient monitoring: cardiac monitor, continuous pulse ox, continuous capnometry, frequent blood pressure checks and oxygen  Approach: midline  Location: L4/L5  Provider prep: mask and sterile gloves  Local infiltration: lidocaine  Needle  Needle type: Quincke   Needle gauge: 22 G  Needle length: 5 in  Assessment  Sensory level: T6  Swirl obtained: Yes  CSF: clear  Attempts: 1  Hemodynamics: stable  Additional Notes  Time out performed.  Well tolerated.  Patient remained sitting for 10 minutes  Preanesthetic Checklist  Completed: patient identified, IV checked, site marked, risks and benefits discussed, surgical/procedural consents, equipment checked, pre-op evaluation, timeout performed, anesthesia consent given, oxygen available, monitors applied/VS acknowledged, fire risk safety assessment completed and verbalized and blood product R/B/A discussed and consented

## 2024-02-02 NOTE — PROGRESS NOTES
Cecilio Donovan M.D.,[facility]  Shaheed Agudelo D.O., FBEATRICE., [facility]  Evans Agarwal M.D.  Abby Black M.D.   SARIKA Soto.O.        Daily Pulmonary Progress Note    Patient:  Rebekah Rodriguez 68 y.o. female MRN: [id number]     Date of Service: 2/2/2024      Synopsis     We are following patient for pre op pulmonary risk assessment    \"CC\" rectal bleeding    Code status: FULL      Subjective      Patient was seen and examined.  Lying in bed returned from surgery perineal proctectomy.  She is wide-awake.  She is asking to go on her BiPAP for a few hours.      Review of Systems:   Constitutional: Denies fever, weight loss, night sweats, and fatigue  Skin: Denies pigmentation, dark lesions, and rashes   HEENT: Denies hearing loss, tinnitus, ear drainage, epistaxis, sore throat, and hoarseness.  Cardiovascular: Denies palpitations, chest pain, and chest pressure.  Respiratory: Pulmonary sarcoidosis.  Chronic cough and dyspnea chronic O2 dependence history of recurrent pneumonias, severe pulmonary artery hypertension  Gastrointestinal: Denies nausea, vomiting, poor appetite, diarrhea, heartburn or reflux rectal pain, rectal bleeding, rectal prolapse, diverticulosis on colonoscopy  Genitourinary: Denies dysuria, frequency, urgency or hematuria  Musculoskeletal: Denies myalgias, muscle weakness, and bone pain  Neurological: Denies dizziness, vertigo, headache, and focal weakness  Psychological: Denies anxiety and depression  Endocrine: Denies heat intolerance and cold intolerance  Hematopoietic/Lymphatic: Denies bleeding problems and blood transfusions    24-hour events:  None    Objective   OBJECTIVE:   /75   Pulse 69   Temp 97 °F (36.1 °C) (Oral)   Resp 18   Ht 1.6 m (5' 3\")   Wt 57.1 kg (125 lb 12.8 oz)   LMP  (LMP Unknown)   SpO2 94%   BMI 22.28 kg/m²   SpO2 Readings from Last 1 Encounters:   02/02/24 94%        I/O:    Intake/Output Summary (Last 24 hours) at 2/2/2024 1155  Last data

## 2024-02-02 NOTE — ANESTHESIA PRE PROCEDURE
Department of Anesthesiology  Preprocedure Note       Name:  Rebekah Rodriguez   Age:  68 y.o.  :  1955                                          MRN:  [id number]         Date:  2024      Surgeon: Surgeon(s):  Carlos A Bae MD    Procedure: Procedure(s):  PERINEAL RECTOSIGMOIDECTOMY, NEEDS LONESTAR RETRACTOR, LITHOTOMY POSITION    Medications prior to admission:   Prior to Admission medications    Medication Sig Start Date End Date Taking? Authorizing Provider   amoxicillin-clavulanate (AUGMENTIN) 875-125 MG per tablet Take 1 tablet by mouth 3 times daily 24 Yes ProviderNellie MD   arformoterol tartrate (BROVANA) 15 MCG/2ML NEBU Take 1 ampule by nebulization in the morning and 1 ampule in the evening.   Yes Provider, MD Nellie   budesonide (PULMICORT) 0.5 MG/2ML nebulizer suspension Take 2 mLs by nebulization 2 times daily   Yes Provider, MD Nellie   digoxin (LANOXIN) 125 MCG tablet Take 0.5 tablets by mouth daily   Yes Provider, MD Nellie   ferrous sulfate (IRON 325) 325 (65 Fe) MG tablet Take 1 tablet by mouth 2 times daily   Yes Provider, MD Nellie   guaiFENesin 400 MG tablet Take 1 tablet by mouth 4 times daily as needed for Cough   Yes Provider, MD Nellie   potassium chloride (KLOR-CON M) 20 MEQ extended release tablet Take 1 tablet by mouth daily   Yes ProviderNellie MD   metoprolol succinate (TOPROL XL) 50 MG extended release tablet Take 1 tablet by mouth 2 times daily 24   Izabella Ludwig DO   Polyvinyl Alcohol-Povidone PF (REFRESH) 1.4-0.6 % SOLN ophthalmic solution Place 1 drop into both eyes in the morning and at bedtime 1/4/24 2/3/24  Izabella Ludwig DO   sildenafil (REVATIO) 20 MG tablet Take 0.5 tablets by mouth 3 times daily 24   iTmothy Muñoz APRN - CNP   ELIQUIS 5 MG TABS tablet Take 1 tablet by mouth 2 times daily 23  Lauro Ellington MD   DULoxetine (CYMBALTA) 60 MG extended release capsule Take 1 capsule by mouth

## 2024-02-02 NOTE — PROGRESS NOTES
Physician Progress Note      PATIENT:               RADHA ARCEO  CSN #:                  [id number]  :                       1955  ADMIT DATE:       2024 2:34 AM  DISCH DATE:  RESPONDING  PROVIDER #:        Romy Horner MD          QUERY TEXT:    Pt has Severe malnutrition documented in  Nutrition Consult Note. Please   further specify type of malnutrition with documentation in the medical record.    The medical record reflects the following:  Risk Factors: Colectomy/ colitis/ SBO/ Rectal prolapse  Clinical Indicators: Per  RD note: Severe malnutrition  Energy Intake:  75% or less estimated energy requirements for 1 month or   longer  Muscle Mass Loss:  Severe muscle mass loss Clavicles (pectoralis & deltoids),   Temples (temporalis)  Current BMI (kg/m2): 22.6  Treatment: Noted CLD prep for c-scope tomorrow. Will monitor diet progression   & add ONS when able/ after procedure.  Options provided:  -- Severe Protein calorie malnutrition  -- Other - I will add my own diagnosis  -- Disagree - Not applicable / Not valid  -- Disagree - Clinically unable to determine / Unknown  -- Refer to Clinical Documentation Reviewer    PROVIDER RESPONSE TEXT:    This patient has severe protein calorie malnutrition.    Query created by: Karely Guthrie on 2024 1:41 PM      QUERY TEXT:    Pt noted to have Delirum, AMS 2/2 opiates. If possible, please document in the   progress notes and discharge summary if AMS can further be specified as any   of the following:    The medical record reflects the following:  Risk Factors: opiates administrated  Clinical Indicators: Per notes, \"Concern pt's AMS is 2/2 opiates given last   night and this AM. Delirium-Avoid opiates for now given propensity for AMS and   hypoxia. With delirium, will stop medrol. Check abg.\"  Treatment: avoid opiates    Thank you,  Karely Guthrie, RN, BSN, CRCR, Clinical Documentation Improvement  Options provided:  -- Drug-induced  encephalopathy due to Opiates  -- Metabolic encephalopathy  -- Toxic encephalopathy  -- Toxic metabolic encephalopathy  -- Delirium without encephalopathy  -- Other - I will add my own diagnosis  -- Disagree - Not applicable / Not valid  -- Disagree - Clinically unable to determine / Unknown  -- Refer to Clinical Documentation Reviewer    PROVIDER RESPONSE TEXT:    Metabolic encephalopathy. Also acute on chronic chf exacerbation and CO2   retention contributing to acute delirium.    Query created by: Karely Guthrie on 1/30/2024 1:19 PM      Electronically signed by:  Romy Horner MD 2/2/2024 4:15 PM

## 2024-02-02 NOTE — OP NOTE
Operative Note      Patient: Rebekah Rodriguez  YOB: 1955  MRN: [id number]    Date of Procedure: 2/2/2024    Pre-Op Diagnosis Codes:     * Rectal prolapse [K62.3]    Post-Op Diagnosis: Same       Procedure(s):  PERINEAL PROCTECTOMY    Surgeon(s):  Carlos A Bae MD    Assistant:   Resident: Janna Dupree DO; Ori Grove DO    Anesthesia: Regional    Estimated Blood Loss (mL): 100     Complications: None    Specimens:   ID Type Source Tests Collected by Time Destination   A : RECTUM Tissue Tissue SURGICAL PATHOLOGY Carlos A Bae MD 2/2/2024 0755        Implants:  * No implants in log *      Drains:   Urinary Catheter 02/02/24 2 Way (Active)       Findings: 10cm prolapsed segment of rectum resected.      HISTORY: Rebekah Rodriguez is a 68 y.o. female with chronic rectal prolapse and recurrent rectal bleeding due to ulcerations. Perineal proctectomy was recommended. The risks benefits and alternatives of the procedure were discussed with the patient who stated understanding and agreed to proceed.    DESCRIPTION OF PROCEDURE: The patient was brought to the operating room and positioned supine in lithotomy on the OR table. Sequential compression devices were placed on the patient's lower extremities and functioning. Preoperative antibiotics were administered. Anesthesia with spinal and MAC was obtained without complication as per the anesthesia record. Immediately prior to the procedure a time-out was called and the surgical checklist was reviewed and agreed upon by all present. The patient was prepped and draped in the usual sterile fashion.    The lonestar retractor was positioned and the anoderm was retracted.    Manual abdomoinal pressure was performed to recreate the prolapsed rectum.  The rectum was grasped with allis clamps and the rectal mucosa was incised with electrocautery approximately 1cm proximal to the dentate line.   Using electrocautery and ligasure device the rectum was

## 2024-02-02 NOTE — PROGRESS NOTES
Comprehensive Nutrition Assessment    Type and Reason for Visit:  Reassess    Nutrition Recommendations/Plan:   Advance diet when medically appropriate.    Recommend and start Ensure Clear supplement daily and Gelatein high protein supplement BID to help meet increased nutritional needs and d/t decreased po intake of meals.    When diet advances, will modify nutritional supplementation to better help meet increased needs.         Malnutrition Assessment:  Malnutrition Status:  Severe malnutrition (01/28/24 1336)    Context:  Chronic Illness     Findings of the 6 clinical characteristics of malnutrition:  Energy Intake:  75% or less estimated energy requirements for 1 month or longer (per previous RD encounter review)  Weight Loss:  Unable to assess (large wt fluctuations)     Body Fat Loss:   (Moderate) Orbital   Muscle Mass Loss:  Severe muscle mass loss Clavicles (pectoralis & deltoids), Temples (temporalis)  Fluid Accumulation:  No significant fluid accumulation     Strength:  Not Performed    Nutrition Assessment:    Patient is currently on clear liquid diet ; s/p surgery for rectal prolapse on 2/2 ; adm w/ abd pain and rectal bleeding ; s/p colonoscopy on 1/29 revealed sigmoid diverticulosis ; noted proctocolitis and hemorrhoids ; noted chronic respiratory failure with hypoxia and hypercapnia ; hx of DM/COPD/CKD/pulmonary sarcoidosis/cardiac arrest ; hx of CHF/seizures/colitis/SBO/rectal prolapse ; hx of recurrent delirium and confusion ; pt remains severely malnourished ; will start ONS    Nutrition Related Findings:    I&Os WNL, no edema, edentulous, hypoactive BS, abd pain, decreased appetite, muscle/fat wasting ; Wound Type: Surgical Incision (Incision x 1)       Current Nutrition Intake & Therapies:    Average Meal Intake: 1-25%     ADULT DIET; Clear Liquid    Anthropometric Measures:  Height: 160 cm (5' 3\")  Ideal Body Weight (IBW): 115 lbs (52 kg)    Admission Body Weight: 57.6 kg (127 lb) (1/28

## 2024-02-03 LAB
ALBUMIN SERPL-MCNC: 3.5 G/DL (ref 3.5–5.2)
ALP SERPL-CCNC: 79 U/L (ref 35–104)
ALT SERPL-CCNC: 6 U/L (ref 0–32)
ANION GAP SERPL CALCULATED.3IONS-SCNC: 14 MMOL/L (ref 7–16)
AST SERPL-CCNC: 13 U/L (ref 0–31)
BASOPHILS # BLD: 0.02 K/UL (ref 0–0.2)
BASOPHILS NFR BLD: 0 % (ref 0–2)
BILIRUB SERPL-MCNC: 0.4 MG/DL (ref 0–1.2)
BUN SERPL-MCNC: 21 MG/DL (ref 6–23)
CALCIUM SERPL-MCNC: 8.8 MG/DL (ref 8.6–10.2)
CHLORIDE SERPL-SCNC: 104 MMOL/L (ref 98–107)
CO2 SERPL-SCNC: 28 MMOL/L (ref 22–29)
CREAT SERPL-MCNC: 1.2 MG/DL (ref 0.5–1)
EOSINOPHIL # BLD: 0.02 K/UL (ref 0.05–0.5)
EOSINOPHILS RELATIVE PERCENT: 0 % (ref 0–6)
ERYTHROCYTE [DISTWIDTH] IN BLOOD BY AUTOMATED COUNT: 16.3 % (ref 11.5–15)
GFR SERPL CREATININE-BSD FRML MDRD: 51 ML/MIN/1.73M2
GLUCOSE SERPL-MCNC: 90 MG/DL (ref 74–99)
HCT VFR BLD AUTO: 32.1 % (ref 34–48)
HGB BLD-MCNC: 9.3 G/DL (ref 11.5–15.5)
IMM GRANULOCYTES # BLD AUTO: 0.05 K/UL (ref 0–0.58)
IMM GRANULOCYTES NFR BLD: 0 % (ref 0–5)
LYMPHOCYTES NFR BLD: 0.66 K/UL (ref 1.5–4)
LYMPHOCYTES RELATIVE PERCENT: 6 % (ref 20–42)
MCH RBC QN AUTO: 24.7 PG (ref 26–35)
MCHC RBC AUTO-ENTMCNC: 29 G/DL (ref 32–34.5)
MCV RBC AUTO: 85.1 FL (ref 80–99.9)
MONOCYTES NFR BLD: 0.7 K/UL (ref 0.1–0.95)
MONOCYTES NFR BLD: 6 % (ref 2–12)
NEUTROPHILS NFR BLD: 87 % (ref 43–80)
NEUTS SEG NFR BLD: 9.73 K/UL (ref 1.8–7.3)
PLATELET # BLD AUTO: 187 K/UL (ref 130–450)
PMV BLD AUTO: 10.5 FL (ref 7–12)
POTASSIUM SERPL-SCNC: 3.7 MMOL/L (ref 3.5–5)
PROT SERPL-MCNC: 6.3 G/DL (ref 6.4–8.3)
RBC # BLD AUTO: 3.77 M/UL (ref 3.5–5.5)
SODIUM SERPL-SCNC: 146 MMOL/L (ref 132–146)
WBC OTHER # BLD: 11.2 K/UL (ref 4.5–11.5)

## 2024-02-03 PROCEDURE — 94640 AIRWAY INHALATION TREATMENT: CPT

## 2024-02-03 PROCEDURE — 94660 CPAP INITIATION&MGMT: CPT

## 2024-02-03 PROCEDURE — 99233 SBSQ HOSP IP/OBS HIGH 50: CPT | Performed by: INTERNAL MEDICINE

## 2024-02-03 PROCEDURE — 6370000000 HC RX 637 (ALT 250 FOR IP): Performed by: SURGERY

## 2024-02-03 PROCEDURE — 2060000000 HC ICU INTERMEDIATE R&B

## 2024-02-03 PROCEDURE — 2700000000 HC OXYGEN THERAPY PER DAY

## 2024-02-03 PROCEDURE — 80053 COMPREHEN METABOLIC PANEL: CPT

## 2024-02-03 PROCEDURE — 99232 SBSQ HOSP IP/OBS MODERATE 35: CPT | Performed by: FAMILY MEDICINE

## 2024-02-03 PROCEDURE — 2580000003 HC RX 258: Performed by: SURGERY

## 2024-02-03 PROCEDURE — 6360000002 HC RX W HCPCS: Performed by: SURGERY

## 2024-02-03 PROCEDURE — 36415 COLL VENOUS BLD VENIPUNCTURE: CPT

## 2024-02-03 PROCEDURE — 93005 ELECTROCARDIOGRAM TRACING: CPT | Performed by: INTERNAL MEDICINE

## 2024-02-03 PROCEDURE — 85025 COMPLETE CBC W/AUTO DIFF WBC: CPT

## 2024-02-03 RX ORDER — GABAPENTIN 100 MG/1
100 CAPSULE ORAL 3 TIMES DAILY
Status: DISCONTINUED | OUTPATIENT
Start: 2024-02-03 | End: 2024-02-05 | Stop reason: HOSPADM

## 2024-02-03 RX ORDER — METHOCARBAMOL 500 MG/1
1000 TABLET, FILM COATED ORAL 4 TIMES DAILY
Status: DISCONTINUED | OUTPATIENT
Start: 2024-02-03 | End: 2024-02-05 | Stop reason: HOSPADM

## 2024-02-03 RX ORDER — POLYETHYLENE GLYCOL 3350 17 G/17G
17 POWDER, FOR SOLUTION ORAL DAILY
Status: DISCONTINUED | OUTPATIENT
Start: 2024-02-03 | End: 2024-02-05 | Stop reason: HOSPADM

## 2024-02-03 RX ADMIN — HYDROCORTISONE 20 MG: 20 TABLET ORAL at 21:39

## 2024-02-03 RX ADMIN — FAMOTIDINE 20 MG: 20 TABLET, FILM COATED ORAL at 08:27

## 2024-02-03 RX ADMIN — SILDENAFIL 10 MG: 20 TABLET, FILM COATED ORAL at 08:25

## 2024-02-03 RX ADMIN — GABAPENTIN 100 MG: 100 CAPSULE ORAL at 08:26

## 2024-02-03 RX ADMIN — LEVOTHYROXINE SODIUM 50 MCG: 0.05 TABLET ORAL at 05:34

## 2024-02-03 RX ADMIN — DESMOPRESSIN ACETATE 200 MCG: 0.1 TABLET ORAL at 08:26

## 2024-02-03 RX ADMIN — METOPROLOL SUCCINATE 50 MG: 50 TABLET, EXTENDED RELEASE ORAL at 21:37

## 2024-02-03 RX ADMIN — OXYCODONE 2.5 MG: 5 TABLET ORAL at 00:21

## 2024-02-03 RX ADMIN — Medication 5 ML: at 22:04

## 2024-02-03 RX ADMIN — LEVETIRACETAM 500 MG: 500 TABLET, FILM COATED ORAL at 21:39

## 2024-02-03 RX ADMIN — FUROSEMIDE 20 MG: 20 TABLET ORAL at 08:27

## 2024-02-03 RX ADMIN — OXYCODONE 2.5 MG: 5 TABLET ORAL at 05:34

## 2024-02-03 RX ADMIN — POLYVINYL ALCOHOL, POVIDONE 1 DROP: 14; 6 SOLUTION/ DROPS OPHTHALMIC at 10:07

## 2024-02-03 RX ADMIN — ARFORMOTEROL TARTRATE 15 MCG: 15 SOLUTION RESPIRATORY (INHALATION) at 21:56

## 2024-02-03 RX ADMIN — HYDROCORTISONE 20 MG: 20 TABLET ORAL at 08:25

## 2024-02-03 RX ADMIN — LEVETIRACETAM 500 MG: 500 TABLET, FILM COATED ORAL at 08:27

## 2024-02-03 RX ADMIN — METHOCARBAMOL 1000 MG: 500 TABLET ORAL at 21:38

## 2024-02-03 RX ADMIN — METHOCARBAMOL 1000 MG: 500 TABLET ORAL at 08:24

## 2024-02-03 RX ADMIN — POTASSIUM CHLORIDE 20 MEQ: 1500 TABLET, EXTENDED RELEASE ORAL at 08:27

## 2024-02-03 RX ADMIN — METOPROLOL SUCCINATE 50 MG: 50 TABLET, EXTENDED RELEASE ORAL at 08:27

## 2024-02-03 RX ADMIN — SILDENAFIL 10 MG: 20 TABLET, FILM COATED ORAL at 21:38

## 2024-02-03 RX ADMIN — BUDESONIDE 500 MCG: 0.5 INHALANT RESPIRATORY (INHALATION) at 21:56

## 2024-02-03 RX ADMIN — DESMOPRESSIN ACETATE 200 MCG: 0.1 TABLET ORAL at 21:40

## 2024-02-03 RX ADMIN — Medication 10 ML: at 10:08

## 2024-02-03 RX ADMIN — DULOXETINE HYDROCHLORIDE 60 MG: 60 CAPSULE, DELAYED RELEASE ORAL at 08:27

## 2024-02-03 RX ADMIN — BUDESONIDE 500 MCG: 0.5 INHALANT RESPIRATORY (INHALATION) at 07:55

## 2024-02-03 RX ADMIN — DIGOXIN 62.5 MCG: 125 TABLET ORAL at 08:25

## 2024-02-03 RX ADMIN — GABAPENTIN 100 MG: 100 CAPSULE ORAL at 21:40

## 2024-02-03 RX ADMIN — POLYVINYL ALCOHOL, POVIDONE 1 DROP: 14; 6 SOLUTION/ DROPS OPHTHALMIC at 21:42

## 2024-02-03 RX ADMIN — ARFORMOTEROL TARTRATE 15 MCG: 15 SOLUTION RESPIRATORY (INHALATION) at 07:55

## 2024-02-03 ASSESSMENT — PAIN - FUNCTIONAL ASSESSMENT: PAIN_FUNCTIONAL_ASSESSMENT: ACTIVITIES ARE NOT PREVENTED

## 2024-02-03 ASSESSMENT — PAIN SCALES - GENERAL
PAINLEVEL_OUTOF10: 0
PAINLEVEL_OUTOF10: 10
PAINLEVEL_OUTOF10: 4
PAINLEVEL_OUTOF10: 10
PAINLEVEL_OUTOF10: 0

## 2024-02-03 ASSESSMENT — PAIN DESCRIPTION - LOCATION
LOCATION: ABDOMEN;BUTTOCKS;RECTUM
LOCATION: ABDOMEN;BUTTOCKS;RECTUM

## 2024-02-03 ASSESSMENT — PAIN DESCRIPTION - DESCRIPTORS
DESCRIPTORS: SORE
DESCRIPTORS: ACHING;CRAMPING;DISCOMFORT

## 2024-02-03 NOTE — PROGRESS NOTES
Oral BID    Polyvinyl Alcohol-Povidone PF  1 drop Both Eyes BID    potassium chloride  20 mEq Oral Daily    sildenafil  10 mg Oral TID    Vitamin D  1,000 Units Oral Daily    sodium chloride flush  5-40 mL IntraVENous 2 times per day    hydrocortisone   Topical BID     PRN meds: oxyCODONE, morphine, diclofenac sodium, albuterol, guaiFENesin, sodium chloride flush, sodium chloride, ondansetron **OR** ondansetron, acetaminophen **OR** [DISCONTINUED] acetaminophen     I reviewed the patient's past medical and surgical history, Medications and Allergies.    O:  /69   Pulse 78   Temp 98.1 °F (36.7 °C) (Temporal)   Resp 19   Ht 1.6 m (5' 3\")   Wt 57.1 kg (125 lb 12.8 oz)   LMP  (LMP Unknown)   SpO2 91%   BMI 22.28 kg/m²        Physical Exam  Vitals reviewed.   Constitutional:       General: She is not in acute distress.  Eyes:      Extraocular Movements: Extraocular movements intact.      Conjunctiva/sclera: Conjunctivae normal.   Cardiovascular:      Rate and Rhythm: Normal rate and regular rhythm.   Pulmonary:      Effort: Pulmonary effort is normal. No respiratory distress.      Breath sounds: Normal breath sounds.      Comments: BiPAP  Abdominal:      General: Bowel sounds are normal.      Palpations: Abdomen is soft.      Tenderness: There is no abdominal tenderness.      Hernia: A hernia (Ventral, not reducible) is present.   Musculoskeletal:         General: No swelling.   Skin:     General: Skin is warm.   Neurological:      General: No focal deficit present.      Mental Status: She is oriented to person, place, and time.   Psychiatric:         Mood and Affect: Mood normal.               Labs:  Na/K/Cl/CO2:  143/3.9/103/28 (02/02 0627)  BUN/Cr/glu/ALT/AST/amyl/lip:  27/1.2/--/9/14/--/-- (02/02 0627)  WBC/Hgb/Hct/Plts:  11.2/9.3/32.1/187 (02/03 0701)  estimated creatinine clearance is 38 mL/min (A) (based on SCr of 1.2 mg/dL (H)).  Other pertinent labs as noted below        Resident Assessment and  Plan     Rectal Prolapse   Colonoscopy revealed sigmoid diverticulosis  Perineal rectosigmoidectomy today  Gen surg following.   Clear liquid diet   Roxicodone 2.5 mg for pain per GS recs       Hx of Severe pulmonary hypertension, Pulmonary sarcoidosis, COPD on 5 to 6 L at home  Monitor BNP  Continue Toprol XL hydralazine, Cortef, Sildenafil, duoneb, pulmicort, Brovana, Albuterol , Guaifenesin     Paroxysmal atrial fibrillation  Continue Toprol-XL 50 mg p.o. twice daily and digoxin 62.5 mg p.o. daily for rate control.    Resume anticoagulation on 2/4      HTN  Continue home med Norvasc  Continue Lasix   Monitor BNP  Monitor BMP  Strict I&O     Hypothyroidsm  Continue home med Synthyroid     Chronic Pain  Continue home med Cymbalta.  Medrol dose pack d/c    Hx of Secondary Adrenal Insufficiency  Continue Cortef 20 mg daily. Increase to 40 mg daily if hypotensive  Transition to Cortef 15/5 mg at d/c     Hx of Seizures  Continue home med Keppra     Hx of Vit D deficiency  Continue home med Vit D       PT/OT evaluation:consulted  DVT prophylaxis:PCDs  GI prophylaxis: not indicated  Disposition:continue present management  Diet: CLD        Electronically signed by Bobby Castillo MD on 2/3/2024 at 7:41 AM  Attending physician: Dr. Harkins

## 2024-02-03 NOTE — PLAN OF CARE
Problem: Discharge Planning  Goal: Discharge to home or other facility with appropriate resources  Outcome: Progressing     Problem: Pain  Goal: Verbalizes/displays adequate comfort level or baseline comfort level  Outcome: Progressing     Problem: ABCDS Injury Assessment  Goal: Absence of physical injury  Outcome: Progressing     Problem: Skin/Tissue Integrity  Goal: Absence of new skin breakdown  Description: 1.  Monitor for areas of redness and/or skin breakdown  2.  Assess vascular access sites hourly  3.  Every 4-6 hours minimum:  Change oxygen saturation probe site  4.  Every 4-6 hours:  If on nasal continuous positive airway pressure, respiratory therapy assess nares and determine need for appliance change or resting period.  Outcome: Progressing     Problem: Safety - Adult  Goal: Free from fall injury  Outcome: Progressing     Problem: Nutrition Deficit:  Goal: Optimize nutritional status  Outcome: Progressing  Flowsheets (Taken 2/2/2024 1324 by Paul Vaughan, RD, LD)  Nutrient intake appropriate for improving, restoring, or maintaining nutritional needs: Recommend appropriate diets, oral nutritional supplements, and vitamin/mineral supplements     Problem: Chronic Conditions and Co-morbidities  Goal: Patient's chronic conditions and co-morbidity symptoms are monitored and maintained or improved  Outcome: Progressing

## 2024-02-03 NOTE — PROGRESS NOTES
INPATIENT CARDIOLOGY FOLLOW-UP    Name: Rebeakh Rodriguez    Age: 68 y.o.    Date of Admission: 1/26/2024  2:34 AM    Date of Service: 2/3/2024    Chief Complaint: Follow-up for preop cardiac evaluation.    Interim History:  No new overnight cardiac complaints except for chest discomfort over the sternal area she noticed after I was done examining the patient..  Patient underwent surgery for rectal prolapse this morning without any perioperative cardiac events.  Patient is off BiPAP this morning on nasal cannula..  This morning she is feeling much better in terms of the perirectal pain and that has significantly improved.  Patient told me currently with no complaints of CP, SOB, palpitations, dizziness, or lightheadedness.  Atrial fibrillation on telemetry.    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    Altered mental status    BRBPR (bright red blood per rectum)    Severe pulmonary hypertension (HCC)    Pulmonary hypertension    Chronic kidney disease, stage III (moderate) (HCC)    Rectal bleeding    Paroxysmal atrial fibrillation (HCC)    Mixed hyperlipidemia    Goals of care, counseling/discussion    Atrial fibrillation with RVR (HCC)    Chronic combined systolic and diastolic heart failure (HCC)    Chronic hypoxemic respiratory failure (HCC)    Mixed simple and mucopurulent chronic  auscultation bilaterally. No wheezes, rales, or rhonchi.  Cardiac: Regular rate and rhythm, +S1S2, no murmurs apparent  Abdomen: Soft, nontender, +bowel sounds  Extremities: Moves all extremities x 4, no lower extremity edema  Neurologic: No focal motor deficits apparent, normal mood and affect  Peripheral Pulses: Intact posterior tibial pulses bilaterally    Intake/Output:    Intake/Output Summary (Last 24 hours) at 2/3/2024 0823  Last data filed at 2/3/2024 0610  Gross per 24 hour   Intake --   Output 770 ml   Net -770 ml     No intake/output data recorded.    Laboratory Tests:  Recent Labs     02/01/24  0610 02/02/24  0627    143   K 3.7 3.9    103   CO2 32* 28   BUN 23 27*   CREATININE 1.1* 1.2*   GLUCOSE 74 84   CALCIUM 8.8 9.2     Lab Results   Component Value Date/Time    MG 1.9 01/01/2024 04:55 AM     Recent Labs     02/01/24  0610 02/02/24 0627   ALKPHOS 96 90   ALT 10 9   AST 17 14   PROT 6.4 6.2*   BILITOT 0.3 0.4   LABALBU 3.3* 3.1*     Recent Labs     02/01/24  0610 02/02/24  0627 02/03/24  0701   WBC 4.4* 9.4 11.2   RBC 4.34 4.27 3.77   HGB 11.0* 10.7* 9.3*   HCT 36.1 35.8 32.1*   MCV 83.2 83.8 85.1   MCH 25.3* 25.1* 24.7*   MCHC 30.5* 29.9* 29.0*   RDW 16.1* 16.1* 16.3*   PLT  --  190 187   MPV 12.6* 10.7 10.5     Lab Results   Component Value Date    CKTOTAL 89 05/17/2023    CKMB 2.2 08/24/2017    TROPONINI <0.01 11/11/2019    TROPONINI <0.01 08/12/2019    TROPONINI <0.01 08/23/2018     Lab Results   Component Value Date    INR 1.1 01/31/2024    INR 1.2 08/28/2023    INR 1.3 08/27/2023    PROTIME 12.4 01/31/2024    PROTIME 13.5 (H) 08/28/2023    PROTIME 14.0 (H) 08/27/2023     Lab Results   Component Value Date    TSH 0.44 01/10/2024     Lab Results   Component Value Date    LABA1C 4.9 05/17/2023     No results found for: \"EAG\"  Lab Results   Component Value Date    CHOL 209 (H) 06/14/2023    CHOL 229 (H) 11/04/2021    CHOL 199 09/26/2016     Lab Results   Component Value Date    TRIG 68
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
## 2024-02-03 NOTE — PROGRESS NOTES
sarcoidosis (HCC)    Steroid-induced avascular necrosis of shoulder (HCC)    Anemia due to chronic illness    Chronic pain syndrome    Bilateral hip pain    Debility    Altered mental status    BRBPR (bright red blood per rectum)    Severe pulmonary hypertension (HCC)    Pulmonary hypertension    Chronic kidney disease, stage III (moderate) (HCC)    Rectal bleeding    Paroxysmal atrial fibrillation (HCC)    Mixed hyperlipidemia    Goals of care, counseling/discussion    Atrial fibrillation with RVR (HCC)    Chronic combined systolic and diastolic heart failure (HCC)    Chronic hypoxemic respiratory failure (HCC)    Mixed simple and mucopurulent chronic bronchitis (HCC)    Recurrent major depressive disorder, in partial remission (HCC)    Gait disturbance    Chest pain    Chronic, continuous use of opioids    PMB (postmenopausal bleeding)    Severe dysplasia of vagina    Epistaxis    Ventral hernia without obstruction or gangrene    Long term (current) use of systemic steroids    Nuclear senile cataract    Constipation    Abdominal pain    Small bowel obstruction (HCC)    Seizure (HCC)    Elevated troponin level    RVF (right ventricular failure) (HCC)    Severe protein-calorie malnutrition (HCC)    Hypokalemia    Primary hypertension    Acute combined systolic and diastolic CHF, NYHA class 1 (HCC)    Acute on chronic diastolic CHF (congestive heart failure) (HCC)    Acute on chronic respiratory failure (HCC)    Shortness of breath    Palliative care encounter    Palliative care by specialist    Congestive heart failure, unspecified HF chronicity, unspecified heart failure type (HCC)    Generalized weakness    Hypopituitarism (HCC)    Secondary adrenal insufficiency (HCC)    Permanent atrial fibrillation (HCC)    Rectal prolapse    Heart failure (HCC)       68 y.o. female admitted with rectal bleed and rectal prolapse.  She underwent a perineal proctectomy on 2/2/2024.  Patient has a history of rectal prolapse, right  hemicolectomy with ileocecal anastomosis due to ischemic colitis.  She also has atrial fibrillation on Eliquis    Continue clear liquid diet.    Sanchez out.    Okay to resume Eliquis tomorrow 2/4/2024      Owen Park MD  General Surgery Resident, PGY-5    Electronically signed on 2/3/2024 at 6:46 AM      Agree

## 2024-02-04 LAB
ALBUMIN SERPL-MCNC: 3 G/DL (ref 3.5–5.2)
ALP SERPL-CCNC: 68 U/L (ref 35–104)
ALT SERPL-CCNC: <5 U/L (ref 0–32)
ANION GAP SERPL CALCULATED.3IONS-SCNC: 14 MMOL/L (ref 7–16)
AST SERPL-CCNC: 11 U/L (ref 0–31)
BASOPHILS # BLD: 0.02 K/UL (ref 0–0.2)
BASOPHILS NFR BLD: 0 % (ref 0–2)
BILIRUB SERPL-MCNC: 0.5 MG/DL (ref 0–1.2)
BUN SERPL-MCNC: 20 MG/DL (ref 6–23)
CALCIUM SERPL-MCNC: 8.8 MG/DL (ref 8.6–10.2)
CHLORIDE SERPL-SCNC: 100 MMOL/L (ref 98–107)
CO2 SERPL-SCNC: 26 MMOL/L (ref 22–29)
CREAT SERPL-MCNC: 1.1 MG/DL (ref 0.5–1)
EOSINOPHIL # BLD: 0.07 K/UL (ref 0.05–0.5)
EOSINOPHILS RELATIVE PERCENT: 1 % (ref 0–6)
ERYTHROCYTE [DISTWIDTH] IN BLOOD BY AUTOMATED COUNT: 16.2 % (ref 11.5–15)
GFR SERPL CREATININE-BSD FRML MDRD: 54 ML/MIN/1.73M2
GLUCOSE BLD-MCNC: 96 MG/DL (ref 74–99)
GLUCOSE SERPL-MCNC: 70 MG/DL (ref 74–99)
HCT VFR BLD AUTO: 27.5 % (ref 34–48)
HCT VFR BLD AUTO: 28.7 % (ref 34–48)
HGB BLD-MCNC: 8.3 G/DL (ref 11.5–15.5)
HGB BLD-MCNC: 8.6 G/DL (ref 11.5–15.5)
IMM GRANULOCYTES # BLD AUTO: 0.03 K/UL (ref 0–0.58)
IMM GRANULOCYTES NFR BLD: 1 % (ref 0–5)
LYMPHOCYTES NFR BLD: 0.75 K/UL (ref 1.5–4)
LYMPHOCYTES RELATIVE PERCENT: 12 % (ref 20–42)
MCH RBC QN AUTO: 25.2 PG (ref 26–35)
MCHC RBC AUTO-ENTMCNC: 30.2 G/DL (ref 32–34.5)
MCV RBC AUTO: 83.6 FL (ref 80–99.9)
MONOCYTES NFR BLD: 0.67 K/UL (ref 0.1–0.95)
MONOCYTES NFR BLD: 11 % (ref 2–12)
NEUTROPHILS NFR BLD: 75 % (ref 43–80)
NEUTS SEG NFR BLD: 4.51 K/UL (ref 1.8–7.3)
PLATELET # BLD AUTO: 155 K/UL (ref 130–450)
PMV BLD AUTO: 11.7 FL (ref 7–12)
POTASSIUM SERPL-SCNC: 4.2 MMOL/L (ref 3.5–5)
PROT SERPL-MCNC: 5.5 G/DL (ref 6.4–8.3)
RBC # BLD AUTO: 3.29 M/UL (ref 3.5–5.5)
SODIUM SERPL-SCNC: 140 MMOL/L (ref 132–146)
WBC OTHER # BLD: 6.1 K/UL (ref 4.5–11.5)

## 2024-02-04 PROCEDURE — 85025 COMPLETE CBC W/AUTO DIFF WBC: CPT

## 2024-02-04 PROCEDURE — 6370000000 HC RX 637 (ALT 250 FOR IP): Performed by: SURGERY

## 2024-02-04 PROCEDURE — 82962 GLUCOSE BLOOD TEST: CPT

## 2024-02-04 PROCEDURE — 99232 SBSQ HOSP IP/OBS MODERATE 35: CPT | Performed by: INTERNAL MEDICINE

## 2024-02-04 PROCEDURE — 2580000003 HC RX 258: Performed by: SURGERY

## 2024-02-04 PROCEDURE — 85018 HEMOGLOBIN: CPT

## 2024-02-04 PROCEDURE — 85014 HEMATOCRIT: CPT

## 2024-02-04 PROCEDURE — 80053 COMPREHEN METABOLIC PANEL: CPT

## 2024-02-04 PROCEDURE — 94640 AIRWAY INHALATION TREATMENT: CPT

## 2024-02-04 PROCEDURE — 99232 SBSQ HOSP IP/OBS MODERATE 35: CPT | Performed by: FAMILY MEDICINE

## 2024-02-04 PROCEDURE — 2700000000 HC OXYGEN THERAPY PER DAY

## 2024-02-04 PROCEDURE — 36415 COLL VENOUS BLD VENIPUNCTURE: CPT

## 2024-02-04 PROCEDURE — 94660 CPAP INITIATION&MGMT: CPT

## 2024-02-04 PROCEDURE — 6360000002 HC RX W HCPCS: Performed by: SURGERY

## 2024-02-04 PROCEDURE — 2060000000 HC ICU INTERMEDIATE R&B

## 2024-02-04 RX ADMIN — HYDROCORTISONE: 1 CREAM TOPICAL at 09:00

## 2024-02-04 RX ADMIN — METOPROLOL SUCCINATE 50 MG: 50 TABLET, EXTENDED RELEASE ORAL at 10:18

## 2024-02-04 RX ADMIN — LEVOTHYROXINE SODIUM 50 MCG: 0.05 TABLET ORAL at 05:14

## 2024-02-04 RX ADMIN — DIGOXIN 62.5 MCG: 125 TABLET ORAL at 10:16

## 2024-02-04 RX ADMIN — METHOCARBAMOL 1000 MG: 500 TABLET ORAL at 20:17

## 2024-02-04 RX ADMIN — HYDROCORTISONE 20 MG: 20 TABLET ORAL at 10:17

## 2024-02-04 RX ADMIN — Medication 10 ML: at 20:21

## 2024-02-04 RX ADMIN — BUDESONIDE 500 MCG: 0.5 INHALANT RESPIRATORY (INHALATION) at 20:35

## 2024-02-04 RX ADMIN — SILDENAFIL 10 MG: 20 TABLET, FILM COATED ORAL at 20:30

## 2024-02-04 RX ADMIN — APIXABAN 5 MG: 5 TABLET, FILM COATED ORAL at 10:17

## 2024-02-04 RX ADMIN — FUROSEMIDE 20 MG: 20 TABLET ORAL at 10:16

## 2024-02-04 RX ADMIN — GABAPENTIN 100 MG: 100 CAPSULE ORAL at 10:17

## 2024-02-04 RX ADMIN — LEVETIRACETAM 500 MG: 500 TABLET, FILM COATED ORAL at 20:17

## 2024-02-04 RX ADMIN — POTASSIUM CHLORIDE 20 MEQ: 1500 TABLET, EXTENDED RELEASE ORAL at 10:16

## 2024-02-04 RX ADMIN — HYDROCORTISONE 20 MG: 20 TABLET ORAL at 20:17

## 2024-02-04 RX ADMIN — METHOCARBAMOL 1000 MG: 500 TABLET ORAL at 10:15

## 2024-02-04 RX ADMIN — DIBUCAINE: 10 OINTMENT TOPICAL at 14:00

## 2024-02-04 RX ADMIN — ACETAMINOPHEN 325MG 650 MG: 325 TABLET ORAL at 05:14

## 2024-02-04 RX ADMIN — DESMOPRESSIN ACETATE 200 MCG: 0.1 TABLET ORAL at 20:20

## 2024-02-04 RX ADMIN — Medication 5 MG: at 20:17

## 2024-02-04 RX ADMIN — FAMOTIDINE 20 MG: 20 TABLET, FILM COATED ORAL at 10:16

## 2024-02-04 RX ADMIN — POLYVINYL ALCOHOL, POVIDONE 1 DROP: 14; 6 SOLUTION/ DROPS OPHTHALMIC at 20:22

## 2024-02-04 RX ADMIN — Medication 1000 UNITS: at 10:18

## 2024-02-04 RX ADMIN — DIBUCAINE: 10 OINTMENT TOPICAL at 10:19

## 2024-02-04 RX ADMIN — APIXABAN 5 MG: 5 TABLET, FILM COATED ORAL at 20:17

## 2024-02-04 RX ADMIN — SILDENAFIL 10 MG: 20 TABLET, FILM COATED ORAL at 10:16

## 2024-02-04 RX ADMIN — ARFORMOTEROL TARTRATE 15 MCG: 15 SOLUTION RESPIRATORY (INHALATION) at 20:35

## 2024-02-04 RX ADMIN — DULOXETINE HYDROCHLORIDE 60 MG: 60 CAPSULE, DELAYED RELEASE ORAL at 10:18

## 2024-02-04 RX ADMIN — GABAPENTIN 100 MG: 100 CAPSULE ORAL at 20:17

## 2024-02-04 RX ADMIN — LEVETIRACETAM 500 MG: 500 TABLET, FILM COATED ORAL at 10:25

## 2024-02-04 RX ADMIN — Medication 10 ML: at 09:00

## 2024-02-04 RX ADMIN — DESMOPRESSIN ACETATE 200 MCG: 0.1 TABLET ORAL at 10:22

## 2024-02-04 RX ADMIN — POLYVINYL ALCOHOL, POVIDONE 1 DROP: 14; 6 SOLUTION/ DROPS OPHTHALMIC at 10:17

## 2024-02-04 ASSESSMENT — PAIN SCALES - GENERAL
PAINLEVEL_OUTOF10: 0
PAINLEVEL_OUTOF10: 0
PAINLEVEL_OUTOF10: 6
PAINLEVEL_OUTOF10: 0
PAINLEVEL_OUTOF10: 0
PAINLEVEL_OUTOF10: 6
PAINLEVEL_OUTOF10: 0

## 2024-02-04 ASSESSMENT — PAIN DESCRIPTION - LOCATION: LOCATION: BUTTOCKS;ABDOMEN

## 2024-02-04 NOTE — PROGRESS NOTES
Perfect serve message sent to FM resident,  , regarding patient refusing H&H. Perfect serve message read and responded, no new orders at this time. Patient voiced\" I don't want to be poked no more, I'm refusing\". After much encouragement, patient continues to refuse at this time.Electronically signed by Krishna Domingo RN on 2/4/2024 at 3:54 PM

## 2024-02-04 NOTE — PROGRESS NOTES
GENERAL SURGERY  DAILY PROGRESS NOTE    Patient's Name/Date of Birth: Rebekah Rodriguez / 1955    Date: 2024     Chief Complaint   Patient presents with    Rectal Pain     Patient complaining of pain from her rectum.  States it has been going on for the past few days and no relief.        Subjective:  Doing well.  Pain well-controlled.  No bowel movements.    Tolerating clears.  Sanchez out - voiding.      Objective:  Last 24Hrs  Temp  Av.1 °F (36.7 °C)  Min: 97.5 °F (36.4 °C)  Max: 98.7 °F (37.1 °C)  Resp  Av.8  Min: 16  Max: 22  Pulse  Av.2  Min: 60  Max: 81  Systolic (24hrs), Av , Min:100 , Max:133     Diastolic (24hrs), Av, Min:58, Max:90    SpO2  Av.7 %  Min: 91 %  Max: 100 %    I/O last 3 completed shifts:  In: 420 [P.O.:420]  Out: 1300 [Urine:1300]      General: In no acute distress, alert and oriented x4  Cardiovascular: Warm throughout, bilateral lower extremity edema  Respiratory: no respiratory distress, equal chest rise  Abdomen: soft,  nontender, nondistended  Skin: no obvious rashes or lesions appreciated, no jaundice  Extremities: atraumatic, no focal motor deficits, no open wounds  Perineum: Well-healing      CBC  Recent Labs     24  0627 24  0701   WBC 9.4 11.2   RBC 4.27 3.77   HGB 10.7* 9.3*   HCT 35.8 32.1*   MCV 83.8 85.1   MCH 25.1* 24.7*   MCHC 29.9* 29.0*   RDW 16.1* 16.3*    187   MPV 10.7 10.5         CMP  Recent Labs     24  0627 24  0701    146   K 3.9 3.7    104   CO2 28 28   BUN 27* 21   CREATININE 1.2* 1.2*   GLUCOSE 84 90   CALCIUM 9.2 8.8   PROT 6.2* 6.3*   LABALBU 3.1* 3.5   BILITOT 0.4 0.4   ALKPHOS 90 79   AST 14 13   ALT 9 6           Assessment/Plan:    Patient Active Problem List   Diagnosis    Chronic back pain    Hypothyroidism    Nephrosclerosis    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 sarcoidosis (HCC)    Steroid-induced avascular necrosis of shoulder  She also has atrial fibrillation on Eliquis    Continue clear liquid diet.    Strict nothing per rectum  Recommend avoid Purwik and patient needs to get OOB to void to avoid excess moisture near perineal wound.  Okay to resume Eliquis today  Encourage activity as tolerated      Janna Dupree DO  General Surgery Resident, PGY-5    Electronically signed on 2/4/2024 at 7:28 AM      Agree    Carlos A Bae MD

## 2024-02-04 NOTE — PROGRESS NOTES
INPATIENT CARDIOLOGY FOLLOW-UP    Name: Rebekah Rodriguez    Age: 68 y.o.    Date of Admission: 1/26/2024  2:34 AM    Date of Service: 2/4/2024    Chief Complaint: Follow-up for preop cardiac evaluation.    Interim History:  No new overnight cardiac complaints. Denies any more episodes of chest pain since yesterday AM.  Patient is off BiPAP this morning on nasal cannula..  This morning she is feeling much better in terms of the perirectal pain and that has significantly improved.  Patient told me currently with no complaints of CP, SOB, palpitations, dizziness, or lightheadedness.  Atrial fibrillation on telemetry.    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    Altered mental status    BRBPR (bright red blood per rectum)    Severe pulmonary hypertension (HCC)    Pulmonary hypertension    Chronic kidney disease, stage III (moderate) (HCC)    Rectal bleeding    Paroxysmal atrial fibrillation (HCC)    Mixed hyperlipidemia    Goals of care, counseling/discussion    Atrial fibrillation with RVR (HCC)    Chronic combined systolic and diastolic heart failure (HCC)    Chronic hypoxemic respiratory failure (HCC)    Mixed simple and mucopurulent chronic bronchitis (HCC)    Recurrent major depressive disorder, in partial remission (HCC)    Gait disturbance    Chest pain    Chronic, continuous use

## 2024-02-04 NOTE — PLAN OF CARE

## 2024-02-04 NOTE — PROGRESS NOTES
[facility]  Family Medicine Attending    S: 68 y.o. female with PMH sarcoidosis, afib on eliquis, pulmonary hypertension, diabetes insipidus, hypothyroidism, hypertension, CKD stage 3a, and combined systolic/diastolic heart failure who presented with rectal pain and BRBPR.  Her hemoglobin is noted to be 9.9.  FOBT+.  Gen surg consulted. Plan for cscope 1/29/24.    1/27 pt confused. Takes off O2, and pulse ox drops significantly. Placed on 8 L NC (home baseline 6 L) with frequent reminder to keep O2 on. Concern pt's AMS is 2/2 opiates given last night and this AM.     1/28: more alert. C/o pain everywhere, chronic complaint for pt. H/o opiate dependence. Started medrol dosepak    1/29:  patient seemingly not aware of doctors trying to take care of her     1/30: Pt seen on pap.  Reports no complaints.     1/31: more alert , desires rectal surgery. Denies anal/rectal abuse  2/1:  More alert today.  Breathing ok. Decreased flatus, mild abdominal discomfort  2/2: patient alert after surgery. Asking for some pain medicaions.  2/3: Resting well in bed no acute complaints overall feels well. Taking sips.     2/4: Reported +gas and BM overnight.     O: VS- Blood pressure 105/67, pulse 62, temperature 97 °F (36.1 °C), temperature source Temporal, resp. rate 18, height 1.6 m (5' 3\"), weight 57.1 kg (125 lb 12.8 oz), SpO2 100 %, not currently breastfeeding.    HEENT:  on 5L NC,seems at baseline (4-6)  Gen: NAD drowsy but answers appropriately  HEENT: NCAT, PERRL, MMM  CV-RRR no M/R/G   Lungs-diminished bs b/l  ABD-soft mild rlq ttp, nor/g, reducible abdominal hernia  Ext-no C/C; tr edema b/l    Impressions:  Rectal bleeding  Proctocolitis  Hemorrhoids  Chronic lung disease, not in acute exacerbation-sarcoidosis, COPD on 5-6 L O2 chronically  PAF on eliquis  Hypertension  Hypothyroidism  Chronic pain    Plan:     General surgery consulted. Cscope 1/29 showed diverticulosis.  POD  #2  s/p rectal prolapse repair

## 2024-02-04 NOTE — PROGRESS NOTES
[facility] - Family Medicine Inpatient   Resident Progress Note    S:  Hospital day: 9   Brief Synopsis: Rebekah Rodriguez is a 68 y.o. female  with a PMH of pulmonary sarcoidosis, atrial fibrillation on Eliquis, pulmonary hypertension, diabetes insipidus, hypothyroidism, hypertension, chronic kidney disease stage IIIa and combined systolic/diastolic heart failure who presents to ED for  rectal pain and BRBPR.  Patient had presented to ED with similar concerns on 01/23/26, CTAP performed at the time revealed findings concerning proctitis. Patient was prescribed Augmentin and discharged.  Sx persisted so she returned to the ED. Patient endorses a Hx of external hemorrhoids, and rectal prolapse. She states that her  will occasionally reduce the the rectal prolapse at home.  In the ED, her hb was stable 9.9, proBNP 6464 (above baseline). UA pos for trace ketones, protein. Digoxin level subtherapeutic. FOBT positive.general surgery consulted. Patient was given zofran, lasix 20 and morphine 2. Pt admitted for rectal bleeding.    Overnight:  Post op day 2   No chest pain, sob, nausea.   States pain is controlled this morning  Resuming Apixaban this am.         Cont meds:    sodium chloride       Scheduled meds:    methocarbamol  1,000 mg Oral 4x Daily    gabapentin  100 mg Oral TID    polyethylene glycol  17 g Oral Daily    dibucaine   Topical TID    acetaminophen  650 mg Oral 4 times per day    hydrocortisone  20 mg Oral BID    melatonin  5 mg Oral Nightly    lidocaine  1 patch TransDERmal Daily    [Held by provider] amLODIPine  5 mg Oral Daily    arformoterol tartrate  15 mcg Nebulization BID RT    budesonide  500 mcg Nebulization BID    desmopressin  200 mcg Oral BID    digoxin  62.5 mcg Oral Daily    DULoxetine  60 mg Oral Daily    apixaban  5 mg Oral BID    famotidine  20 mg Oral Daily    furosemide  20 mg Oral Daily    levETIRAcetam  500 mg Oral BID    levothyroxine  50 mcg Oral Daily    metoprolol succinate  50 mg

## 2024-02-05 VITALS
HEART RATE: 74 BPM | RESPIRATION RATE: 19 BRPM | DIASTOLIC BLOOD PRESSURE: 58 MMHG | SYSTOLIC BLOOD PRESSURE: 102 MMHG | BODY MASS INDEX: 23.39 KG/M2 | WEIGHT: 132 LBS | HEIGHT: 63 IN | OXYGEN SATURATION: 95 % | TEMPERATURE: 97.3 F

## 2024-02-05 LAB
ALBUMIN SERPL-MCNC: 3 G/DL (ref 3.5–5.2)
ALP SERPL-CCNC: 73 U/L (ref 35–104)
ALT SERPL-CCNC: 6 U/L (ref 0–32)
ANION GAP SERPL CALCULATED.3IONS-SCNC: 13 MMOL/L (ref 7–16)
AST SERPL-CCNC: 11 U/L (ref 0–31)
BASOPHILS # BLD: 0.01 K/UL (ref 0–0.2)
BASOPHILS NFR BLD: 0 % (ref 0–2)
BILIRUB SERPL-MCNC: 0.3 MG/DL (ref 0–1.2)
BUN SERPL-MCNC: 21 MG/DL (ref 6–23)
CALCIUM SERPL-MCNC: 8.7 MG/DL (ref 8.6–10.2)
CHLORIDE SERPL-SCNC: 101 MMOL/L (ref 98–107)
CO2 SERPL-SCNC: 28 MMOL/L (ref 22–29)
CREAT SERPL-MCNC: 1.2 MG/DL (ref 0.5–1)
EOSINOPHIL # BLD: 0.07 K/UL (ref 0.05–0.5)
EOSINOPHILS RELATIVE PERCENT: 2 % (ref 0–6)
ERYTHROCYTE [DISTWIDTH] IN BLOOD BY AUTOMATED COUNT: 15.9 % (ref 11.5–15)
GFR SERPL CREATININE-BSD FRML MDRD: 50 ML/MIN/1.73M2
GLUCOSE SERPL-MCNC: 85 MG/DL (ref 74–99)
HCT VFR BLD AUTO: 27.4 % (ref 34–48)
HGB BLD-MCNC: 8.1 G/DL (ref 11.5–15.5)
IMM GRANULOCYTES # BLD AUTO: <0.03 K/UL (ref 0–0.58)
IMM GRANULOCYTES NFR BLD: 0 % (ref 0–5)
LYMPHOCYTES NFR BLD: 0.81 K/UL (ref 1.5–4)
LYMPHOCYTES RELATIVE PERCENT: 18 % (ref 20–42)
MCH RBC QN AUTO: 24.8 PG (ref 26–35)
MCHC RBC AUTO-ENTMCNC: 29.6 G/DL (ref 32–34.5)
MCV RBC AUTO: 84 FL (ref 80–99.9)
MONOCYTES NFR BLD: 0.49 K/UL (ref 0.1–0.95)
MONOCYTES NFR BLD: 11 % (ref 2–12)
NEUTROPHILS NFR BLD: 70 % (ref 43–80)
NEUTS SEG NFR BLD: 3.21 K/UL (ref 1.8–7.3)
PLATELET # BLD AUTO: 157 K/UL (ref 130–450)
PMV BLD AUTO: 11.7 FL (ref 7–12)
POTASSIUM SERPL-SCNC: 4.2 MMOL/L (ref 3.5–5)
PROT SERPL-MCNC: 5.7 G/DL (ref 6.4–8.3)
RBC # BLD AUTO: 3.26 M/UL (ref 3.5–5.5)
SODIUM SERPL-SCNC: 142 MMOL/L (ref 132–146)
WBC OTHER # BLD: 4.6 K/UL (ref 4.5–11.5)

## 2024-02-05 PROCEDURE — 85025 COMPLETE CBC W/AUTO DIFF WBC: CPT

## 2024-02-05 PROCEDURE — 6370000000 HC RX 637 (ALT 250 FOR IP): Performed by: SURGERY

## 2024-02-05 PROCEDURE — 94640 AIRWAY INHALATION TREATMENT: CPT

## 2024-02-05 PROCEDURE — 80053 COMPREHEN METABOLIC PANEL: CPT

## 2024-02-05 PROCEDURE — 2700000000 HC OXYGEN THERAPY PER DAY

## 2024-02-05 PROCEDURE — 97535 SELF CARE MNGMENT TRAINING: CPT

## 2024-02-05 PROCEDURE — 97530 THERAPEUTIC ACTIVITIES: CPT

## 2024-02-05 PROCEDURE — 6360000002 HC RX W HCPCS: Performed by: SURGERY

## 2024-02-05 PROCEDURE — 36415 COLL VENOUS BLD VENIPUNCTURE: CPT

## 2024-02-05 PROCEDURE — 94660 CPAP INITIATION&MGMT: CPT

## 2024-02-05 PROCEDURE — 99238 HOSP IP/OBS DSCHRG MGMT 30/<: CPT | Performed by: FAMILY MEDICINE

## 2024-02-05 PROCEDURE — 2580000003 HC RX 258: Performed by: SURGERY

## 2024-02-05 RX ORDER — POLYETHYLENE GLYCOL 3350 17 G/17G
17 POWDER, FOR SOLUTION ORAL 2 TIMES DAILY
Qty: 527 G | Refills: 1 | Status: SHIPPED | OUTPATIENT
Start: 2024-02-05 | End: 2024-03-07

## 2024-02-05 RX ADMIN — FAMOTIDINE 20 MG: 20 TABLET, FILM COATED ORAL at 09:55

## 2024-02-05 RX ADMIN — POTASSIUM CHLORIDE 20 MEQ: 1500 TABLET, EXTENDED RELEASE ORAL at 09:55

## 2024-02-05 RX ADMIN — HYDROCORTISONE 20 MG: 20 TABLET ORAL at 09:58

## 2024-02-05 RX ADMIN — LEVETIRACETAM 500 MG: 500 TABLET, FILM COATED ORAL at 09:55

## 2024-02-05 RX ADMIN — FUROSEMIDE 20 MG: 20 TABLET ORAL at 09:55

## 2024-02-05 RX ADMIN — Medication 10 ML: at 09:59

## 2024-02-05 RX ADMIN — SILDENAFIL 10 MG: 20 TABLET, FILM COATED ORAL at 09:59

## 2024-02-05 RX ADMIN — METHOCARBAMOL 1000 MG: 500 TABLET ORAL at 09:55

## 2024-02-05 RX ADMIN — APIXABAN 5 MG: 5 TABLET, FILM COATED ORAL at 09:56

## 2024-02-05 RX ADMIN — ARFORMOTEROL TARTRATE 15 MCG: 15 SOLUTION RESPIRATORY (INHALATION) at 07:20

## 2024-02-05 RX ADMIN — SILDENAFIL 10 MG: 20 TABLET, FILM COATED ORAL at 15:16

## 2024-02-05 RX ADMIN — DIBUCAINE: 10 OINTMENT TOPICAL at 10:00

## 2024-02-05 RX ADMIN — DIGOXIN 62.5 MCG: 125 TABLET ORAL at 09:55

## 2024-02-05 RX ADMIN — ACETAMINOPHEN 325MG 650 MG: 325 TABLET ORAL at 13:04

## 2024-02-05 RX ADMIN — METHOCARBAMOL 1000 MG: 500 TABLET ORAL at 13:04

## 2024-02-05 RX ADMIN — DIBUCAINE: 10 OINTMENT TOPICAL at 15:17

## 2024-02-05 RX ADMIN — BUDESONIDE 500 MCG: 0.5 INHALANT RESPIRATORY (INHALATION) at 07:20

## 2024-02-05 RX ADMIN — GABAPENTIN 100 MG: 100 CAPSULE ORAL at 13:04

## 2024-02-05 RX ADMIN — GABAPENTIN 100 MG: 100 CAPSULE ORAL at 09:56

## 2024-02-05 RX ADMIN — Medication 1000 UNITS: at 13:04

## 2024-02-05 RX ADMIN — HYDROCORTISONE: 1 CREAM TOPICAL at 10:01

## 2024-02-05 RX ADMIN — DULOXETINE HYDROCHLORIDE 60 MG: 60 CAPSULE, DELAYED RELEASE ORAL at 09:55

## 2024-02-05 RX ADMIN — ACETAMINOPHEN 325MG 650 MG: 325 TABLET ORAL at 00:38

## 2024-02-05 RX ADMIN — DESMOPRESSIN ACETATE 200 MCG: 0.1 TABLET ORAL at 09:58

## 2024-02-05 RX ADMIN — POLYVINYL ALCOHOL, POVIDONE 1 DROP: 14; 6 SOLUTION/ DROPS OPHTHALMIC at 10:00

## 2024-02-05 ASSESSMENT — PAIN DESCRIPTION - DESCRIPTORS: DESCRIPTORS: ACHING

## 2024-02-05 ASSESSMENT — PAIN SCALES - GENERAL
PAINLEVEL_OUTOF10: 4
PAINLEVEL_OUTOF10: 10

## 2024-02-05 ASSESSMENT — PAIN DESCRIPTION - LOCATION: LOCATION: BUTTOCKS

## 2024-02-05 NOTE — PROGRESS NOTES
Occupational Therapy  OT BEDSIDE TREATMENT NOTE   [facility]  [address] [place], [place]      Date:2024  Patient Name: Rebekah Rodriguez  MRN: [id number]  : 1955  Room: 74/7401-A     Evaluating OT: Micheline Stratton OTR/L; [id number]        Referring Provider: Carlos A Bae MD     Specific Provider Orders/Date: OT Eval and Treat 24        Diagnosis: Rectal bleeding; Heart failure     Surgery: 24 COLONOSCOPY DIAGNOSTIC     Pertinent Medical History:  has a past medical history of A-fib ([facility]), Abnormal mammogram, Acute on chronic respiratory failure ([facility]), Anemia, Anemia due to chronic illness, Ankle fracture, left, 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[facility]), Backache, Benign hypertension, CHF (congestive heart failure) ([facility]), Chronic back pain, Chronic kidney disease, Chronic pain disorder, Chronic, continuous use of opioids, Compression fracture of thoracic vertebra ([facility]), COPD (chronic obstructive pulmonary disease) ([facility]), Debility, Deformity of ankle and foot, acquired, Depression, Diabetes insipidus ([facility]), Diverticulitis, Dry eyes, Encephalopathy acute, Gait disturbance, GERD (gastroesophageal reflux disease), Hemorrhoids, Hernia, History of blood transfusion, History of Clostridium difficile, Hyperlipidemia, Hyperthyroidism, Hypothyroidism, Ischemic colitis, enteritis, or enterocolitis ([facility]), Long term (current) use of systemic steroids, Long-term current use of steroids, Lumbar disc herniation, Myalgia and myositis, unspecified, Nephrosclerosis, Nonunion of fracture, Osteoarthritis, PAF (paroxysmal atrial fibrillation) ([facility]), 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HCC), Pulmonary hypertension ([facility]), Pulmonary sarcoidosis ([facility]), Rectal bleeding, Rhabdomyolysis, 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      Recommended Adaptive Equipment: TBD

## 2024-02-05 NOTE — PROGRESS NOTES
Physical Therapy  Physical Therapy treatment note     Name: Rebekah Rodriguez  : 1955  MRN: [id number]      Date of Service: 2024    Evaluating PT:  Cayetano Bedoya PT, DPT    Room #:  7401/7401-A  Diagnosis:  Rectal bleeding [K62.5]  Heart failure (HCC) [I50.9]  Congestive heart failure, unspecified HF chronicity, unspecified heart failure type (HCC) [I50.9]  PMHx/PSHx:  pulm HTN, diabetes, HTN, CKD III, CHF, pulmonary sarcoidosis    Procedure/Surgery:  s/p colonoscopy , perineal proctectomy    Precautions:  fall risk, w/c bound, bed alarm, O2, contact iso - VRE, continuous pulse ox    Equipment Owned: w/c, ww, bsc, O2 (6L @ baseline)  Equipment Needs:  TBD    SUBJECTIVE:    Pt lives with spouse in a 1 story home with ramped entry.  Bed/bath is on 1st floor.  Pt mobilizes mainly with w/c PTA.    OBJECTIVE:   Initial Evaluation  Date: 24 Treatment  Date: 24 Short Term/ Long Term   Goals   AM-PAC 6 Clicks     Was pt agreeable to Eval/treatment? yes Yes     Does pt have pain? 8/10 chest Denies pain     Bed Mobility  Rolling: min A  Supine to sit: min A  Sit to supine: min A  Scooting: min A NT, pt received in bedside chair  Rolling: Supervision  Supine to sit: Supervision  Sit to supine: Supervision  Scooting: Supervision   Transfers Sit to stand: mod A  Stand to sit: mod A  Stand pivot: mod A with no AD Sit to stand: Madi  Stand to sit: Madi  Stand pivot: NT Sit to stand: SBA  Stand to sit: SBA  Stand pivot: SBA with AAD   Ambulation    NT, pt declined 5' forward and backward with WW and ModA 10'+ with AAD SBA   Stair negotiation: ascended and descended  NT NT      Strength/ROM:   BLE grossly 3+/5  BLE AROM WFL    Balance:   Static Sitting: NT  Dynamic Sitting: NT  Static Standing: Madi WW  Dynamic Standing: Mod A WW    Pt is A & O x 3   Sensation:  Pt denies numbness and tingling to extremities  Edema:  unremarkable    Vitals:   Spo2 % with mobility, on 4L   /64 post  ambulation sitting in bedside chair   -135     Therapeutic Exercises:  NA    Patient education  Pt educated on safety with all mobility, gait sequencing with WW.     Patient response to education:   Pt verbalized understanding Pt demonstrated skill Pt requires further education in this area   Yes  Partially  Reinforce      ASSESSMENT:    Comments:  Pt received in bedside chair and agreeable to PT treatment upon arrival. RN present intermittently, pt cleared for participation. Pt reports she has assistance from her  and caregivers throughout the day. She reports she does us WC for mobility recently d/t her health, but does pivot and ambulate short distances. Pt able to ambulate with WW short distance with assistance and VC for safety and sequencing. Pt presents with short B step length, forward flexed posture, and reduced speed and stability. Pt returned to bedside chair post ambulation. Pt reported fatigue and SOB; all vitals WNL. Pt educated on pursed lip breathing. Pt agreeable to remain in chair to promote upright tolerance. Pt left in chair at end of session with all needs in place and lines managed. Patient would benefit from continued skilled PT to maximize functional mobility independence. Case discussed with CM.       Treatment:  Patient practiced and was instructed in the following treatment:    Transfers: Verbal cues for proper positioning and sequencing to perform transfers safely with maximum independence.   Gait Training: Verbal cues for proper positioning and sequencing using assistive device to maximize functional mobility independence.   Pt education for WW use and sequencing.   Vitals and symptoms monitored during session.    PLAN:    Patient is making good progress towards established goals.  Will continue with current POC.      Time in  0953  Time out  1010    Total Treatment Time  17 minutes     CPT codes:  [] Gait training 82244 0 minutes  [] Manual therapy 85628 0 minutes  [x]

## 2024-02-05 NOTE — PROGRESS NOTES
[facility] - Family Medicine Inpatient   Resident Progress Note    S:  Hospital day: 10   Brief Synopsis: Rebekah Rodriguez is a 68 y.o. female  with a PMH of pulmonary sarcoidosis, atrial fibrillation on Eliquis, pulmonary hypertension, diabetes insipidus, hypothyroidism, hypertension, chronic kidney disease stage IIIa and combined systolic/diastolic heart failure who presents to ED for  rectal pain and BRBPR.  Patient had presented to ED with similar concerns on 01/23/26, CTAP performed at the time revealed findings concerning proctitis. Patient was prescribed Augmentin and discharged.  Sx persisted so she returned to the ED. Patient endorses a Hx of external hemorrhoids, and rectal prolapse. She states that her  will occasionally reduce the the rectal prolapse at home.  In the ED, her hb was stable 9.9, proBNP 6464 (above baseline). UA pos for trace ketones, protein. Digoxin level subtherapeutic. FOBT positive.general surgery consulted. Patient was given zofran, lasix 20 and morphine 2. Pt admitted for rectal bleeding.  After risk stratification per pulmonology and cardiology, patient underwent perineal rectosigmoidectomy on 02/02/24. Vitals remained stable, patient maintained on a clear liquid diet post-operatively with a goal to advance as tolerated. Eliquis was resumed. Pain control per GS.    Overnight:  Post op day 3. No overnight events.  Patient awake and alert, no new complaints or concerns. States that pain is well controlled      Cont meds:    sodium chloride       Scheduled meds:    methocarbamol  1,000 mg Oral 4x Daily    gabapentin  100 mg Oral TID    polyethylene glycol  17 g Oral Daily    dibucaine   Topical TID    acetaminophen  650 mg Oral 4 times per day    hydrocortisone  20 mg Oral BID    melatonin  5 mg Oral Nightly    lidocaine  1 patch TransDERmal Daily    arformoterol tartrate  15 mcg Nebulization BID RT    budesonide  500 mcg Nebulization BID    desmopressin  200 mcg Oral BID    digoxin   20/1.1/--/<5/11/--/-- (02/04 0657)  WBC/Hgb/Hct/Plts:  --/8.6/28.7/-- (02/04 2101)  estimated creatinine clearance is 41 mL/min (A) (based on SCr of 1.1 mg/dL (H)).  Other pertinent labs as noted below        Resident Assessment and Plan     Rectal Prolapse   Colonoscopy revealed sigmoid diverticulosis  Gen surg following.   Post op day 3  Clear liquid diet, advance as tolerated per GS recs  Continue Roxicodone 2.5 mg and Morphine 2 mg PRN for pain   OK for d/c per GS       Hx of Severe pulmonary hypertension, Pulmonary sarcoidosis, COPD on 5 to 6 L at home  Monitor BNP  Continue Toprol XL hydralazine, Cortef, Sildenafil, duoneb, pulmicort, Brovana, Albuterol , Guaifenesin     Paroxysmal atrial fibrillation  Continue Toprol-XL 50 mg p.o. twice daily and digoxin 62.5 mg p.o. daily for rate control.    Resume anticoagulation on 2/4      HTN  Continue home med Norvasc  Continue Lasix   Monitor BNP  Monitor BMP  Strict I&O     Hypothyroidsm  Continue home med Synthyroid     Chronic Pain  Continue home med Cymbalta.  Medrol dose pack d/c    Hx of Secondary Adrenal Insufficiency  Continue Cortef 20 mg daily. Increase to 40 mg daily if hypotensive  Transition to Cortef 15/5 mg at d/c     Hx of Seizures  Continue home med Keppra     Hx of Vit D deficiency  Continue home med Vit D       PT/OT evaluation:consulted  DVT prophylaxis:PCDs  GI prophylaxis: not indicated  Disposition:continue present management  Diet: CLD        Electronically signed by Celena Clark MD on 2/5/2024 at 6:25 AM  Attending physician: Dr. Lizama

## 2024-02-05 NOTE — PROGRESS NOTES
GENERAL SURGERY  DAILY PROGRESS NOTE    Patient's Name/Date of Birth: Rebekah Rodriguez / 1955    Date: 2024     Chief Complaint   Patient presents with    Rectal Pain     Patient complaining of pain from her rectum.  States it has been going on for the past few days and no relief.        Subjective:  Doing well.  Pain well-controlled.  Passing flatus.  Wants to eat solid food. Tolerating clears.    - suspect she may have some fecal incontinence as she did not realize she had a bowel movement but on exam had stool in perianal region      Objective:  Last 24Hrs  Temp  Av.1 °F (36.2 °C)  Min: 97 °F (36.1 °C)  Max: 97.6 °F (36.4 °C)  Resp  Av.2  Min: 17  Max: 20  Pulse  Av  Min: 62  Max: 87  Systolic (24hrs), Av , Min:88 , Max:105     Diastolic (24hrs), Av, Min:55, Max:67    SpO2  Av.3 %  Min: 98 %  Max: 100 %    I/O last 3 completed shifts:  In: 420 [P.O.:420]  Out: 600 [Urine:600]      General: In no acute distress, alert and oriented x4  Cardiovascular: Warm throughout, bilateral lower extremity edema  Respiratory: no respiratory distress, equal chest rise. On bipap overnight.  Abdomen: soft,  nontender, nondistended  Skin: no obvious rashes or lesions appreciated, no jaundice  Extremities: atraumatic, no focal motor deficits, no open wounds  Perineum: Well-healing.  Soft stool on perianal skin      CBC  Recent Labs     24  0627 24  0701 24  0657 24  2101   WBC 9.4 11.2 6.1  --    RBC 4.27 3.77 3.29*  --    HGB 10.7* 9.3* 8.3* 8.6*   HCT 35.8 32.1* 27.5* 28.7*   MCV 83.8 85.1 83.6  --    MCH 25.1* 24.7* 25.2*  --    MCHC 29.9* 29.0* 30.2*  --    RDW 16.1* 16.3* 16.2*  --     187 155  --    MPV 10.7 10.5 11.7  --          CMP  Recent Labs     24  0627 24  0701 24  0657    146 140   K 3.9 3.7 4.2    104 100   CO2 28 28 26   BUN 27* 21 20   CREATININE 1.2* 1.2* 1.1*   GLUCOSE 84 90 70*   CALCIUM 9.2 8.8 8.8   PROT  Hypopituitarism (HCC)    Secondary adrenal insufficiency (HCC)    Permanent atrial fibrillation (HCC)    Rectal prolapse    Heart failure (HCC)       68 y.o. female admitted with rectal bleed and rectal prolapse.  She underwent a perineal proctectomy on 2/2/2024.  Patient has a history of rectal prolapse, right hemicolectomy with ileocecal anastomosis due to ischemic colitis.  She also has atrial fibrillation on Eliquis    Advance diet as tolerated  Strict nothing per rectum  Recommend avoid Purwik and patient needs to get OOB to void to avoid excess moisture near perineal wound.  Starting to have bowel function   Okay to resume Eliquis   Encourage activity as tolerated      Janna Dupree DO  General Surgery Resident, PGY-5    Electronically signed on 2/5/2024 at 6:14 AM      Ok for DC from my POV.   Strict nothing per rectum for 4 weeks, discussed with pt.      Carlos A Bae MD

## 2024-02-05 NOTE — ANESTHESIA POSTPROCEDURE EVALUATION
Department of Anesthesiology  Postprocedure Note    Patient: Rebekah Rodriguez  MRN: [id number]  YOB: 1955  Date of evaluation: 2/5/2024    Procedure Summary       Date: 02/02/24 Room / Location: [address]    Anesthesia Start: 0651 Anesthesia Stop: 0902    Procedure: PERINEAL RECTOSIGMOIDECTOMY (Rectum) Diagnosis:       Rectal prolapse      (Rectal prolapse [K62.3])    Surgeons: Carlos A Bae MD Responsible Provider: Keena Harvey MD    Anesthesia Type: Spinal, MAC ASA Status: 4            Anesthesia Type: Spinal, MAC    Amy Phase I: Amy Score: 9    Amy Phase II:      Anesthesia Post Evaluation    Patient location during evaluation: floor  Patient participation: complete - patient participated  Level of consciousness: awake and alert  Airway patency: patent  Nausea & Vomiting: no nausea and no vomiting  Cardiovascular status: blood pressure returned to baseline and hemodynamically stable  Respiratory status: acceptable and spontaneous ventilation  Hydration status: euvolemic  Multimodal analgesia pain management approach  Pain management: adequate    No notable events documented.

## 2024-02-05 NOTE — CARE COORDINATION
2/5/24  Update CM note. Pt admitted 1/26/24 for heart failure, c/o rectal pain. S/p rectosigmoidectomy 2/2/24. Discharge plan at this time is to return home ,  will provide transportation. Pt is active with [facility] has setup for 4L/NC and Bipap.   Maribel HERRERAN RN-BC  [phone]

## 2024-02-05 NOTE — PROGRESS NOTES
[facility]  Family Medicine Attending    S: 68 y.o. female with PMH sarcoidosis, afib on eliquis, pulmonary hypertension, diabetes insipidus, hypothyroidism, hypertension, CKD stage 3a, and combined systolic/diastolic heart failure who presented with rectal pain and BRBPR.  Her hemoglobin is noted to be 9.9.  FOBT+.  Gen surg consulted. Plan for cscope 1/29/24.    1/27 pt confused. Takes off O2, and pulse ox drops significantly. Placed on 8 L NC (home baseline 6 L) with frequent reminder to keep O2 on. Concern pt's AMS is 2/2 opiates given last night and this AM.     1/28: more alert. C/o pain everywhere, chronic complaint for pt. H/o opiate dependence. Started medrol dosepak    1/29:  patient seemingly not aware of doctors trying to take care of her.     1/30: Pt seen on pap.  Reports no complaints.     1/31: more alert , desires rectal surgery. Denies anal/rectal abuse  2/1:  More alert today.  Breathing ok. Decreased flatus, mild abdominal discomfort  2/2: patient alert after surgery. Asking for some pain medicaions.  2/3: Resting well in bed no acute complaints overall feels well. Taking sips.     2/4: Reported +gas and BM overnight.     2/5: Sitting up in chair, A&A, oriented, pain controlled, tolerating diet.  Has passed BM.  Oxygen use at baseline, feeling well, no dyspnea.  No concerns.      O: VS- Blood pressure (!) 102/58, pulse 74, temperature 97.3 °F (36.3 °C), temperature source Temporal, resp. rate 19, height 1.6 m (5' 3\"), weight 59.9 kg (132 lb), SpO2 95 %, not currently breastfeeding.    Gen: NAD, A&A, in good spirits.    HEENT: NCAT, MMM  CV-RRR no M/R/G   Lungs-diminished but CTAB  Ext-no C/C; tr edema b/l    Impressions:  Rectal bleeding  Proctocolitis  Hemorrhoids  Chronic lung disease, not in acute exacerbation-sarcoidosis, COPD on 5-6 L O2 chronically  PAF on eliquis  Hypertension  Hypothyroidism  Chronic pain    Plan:     General surgery consulted. Cscope 1/29 showed

## 2024-02-05 NOTE — PLAN OF CARE
Problem: Discharge Planning  Goal: Discharge to home or other facility with appropriate resources  2/5/2024 1619 by Toyin Mitchell RN  Outcome: Adequate for Discharge  2/5/2024 1222 by Toyin Mitchell RN  Outcome: Progressing     Problem: Pain  Goal: Verbalizes/displays adequate comfort level or baseline comfort level  2/5/2024 1619 by Toyin Mitchell RN  Outcome: Adequate for Discharge  2/5/2024 1222 by Toyin Mitchell RN  Outcome: Progressing     Problem: ABCDS Injury Assessment  Goal: Absence of physical injury  2/5/2024 1619 by Toyin Mitchell RN  Outcome: Adequate for Discharge  2/5/2024 1222 by Toyin Mitchell RN  Outcome: Progressing     Problem: Skin/Tissue Integrity  Goal: Absence of new skin breakdown  Description: 1.  Monitor for areas of redness and/or skin breakdown  2.  Assess vascular access sites hourly  3.  Every 4-6 hours minimum:  Change oxygen saturation probe site  4.  Every 4-6 hours:  If on nasal continuous positive airway pressure, respiratory therapy assess nares and determine need for appliance change or resting period.  Outcome: Adequate for Discharge     Problem: Safety - Adult  Goal: Free from fall injury  2/5/2024 1619 by Toyin Mitchell RN  Outcome: Adequate for Discharge  2/5/2024 1222 by Toyin Mitchell RN  Outcome: Progressing     Problem: Nutrition Deficit:  Goal: Optimize nutritional status  Outcome: Adequate for Discharge     Problem: Chronic Conditions and Co-morbidities  Goal: Patient's chronic conditions and co-morbidity symptoms are monitored and maintained or improved  Outcome: Adequate for Discharge

## 2024-02-05 NOTE — PROGRESS NOTES
02/04/24 2049   NIV Type   $NIV $Daily Charge   NIV Started/Stopped (S)  On   Equipment Type V60   Mode Bilevel   Mask Type Full face mask   Mask Size Medium   Assessment   Comfort Level Good   Using Accessory Muscles No   Mask Compliance Good   Skin Assessment Clean, dry, & intact   Skin Protection for O2 Device Yes   Orientation Middle   Location Nose   Intervention(s) Skin Barrier   Settings/Measurements   PIP Observed 14 cm H20   IPAP 14 cmH20   CPAP/EPAP 8 cmH2O   Vt (Measured) 368 mL   Rate Ordered 14   Insp Rise Time (%) 1 %   FiO2  40 %   Mask Leak (lpm) 33 lpm   Patient's Home Machine No   Alarm Settings   Alarms On Y   Patient Observation   Observations (S)  Patient placed on BIPAP for HS

## 2024-02-05 NOTE — PROGRESS NOTES
Discharge instructions reviewed with  and patient at the bedside, explained to  rationale of narcotic medications and he voiced understanding. The importance of attending all follow up appointments as scheduled. Reviewed the importance of medication compliance. Patient discharged home with all belongings with no signs or symptoms of distress observed.

## 2024-02-05 NOTE — PROGRESS NOTES
Cecilio Donovan M.D.,[facility]  Shaheed Agudelo D.O., FBEATRICE., [facility]  Evans Agarwal M.D.  Abby Black M.D.   PEDRITO SotoO.        Daily Pulmonary Progress Note    Patient:  Rebekah Rodriguez 68 y.o. female MRN: [id number]     Date of Service: 2/5/2024      Synopsis     We are following patient for pre op pulmonary risk assessment    \"CC\" rectal bleeding    Code status: FULL      Subjective      Patient was seen and examined sitting up in the chair talking to her  on the phone.  I have never seen Rebekah look so good.  She is not in any distress and on nasal cannula.  General surgery gave ok for discharge.    Review of Systems:   Constitutional: Denies fever, weight loss, night sweats, and fatigue  Skin: Denies pigmentation, dark lesions, and rashes   HEENT: Denies hearing loss, tinnitus, ear drainage, epistaxis, sore throat, and hoarseness.  Cardiovascular: Denies palpitations, chest pain, and chest pressure.  Respiratory: Pulmonary sarcoidosis.  Chronic cough and dyspnea chronic O2 dependence history of recurrent pneumonias, severe pulmonary artery hypertension  Gastrointestinal: Denies nausea, vomiting, poor appetite, diarrhea, heartburn or reflux rectal pain, rectal bleeding, rectal prolapse, diverticulosis on colonoscopy  Genitourinary: Denies dysuria, frequency, urgency or hematuria  Musculoskeletal: Denies myalgias, muscle weakness, and bone pain  Neurological: Denies dizziness, vertigo, headache, and focal weakness  Psychological: Denies anxiety and depression  Endocrine: Denies heat intolerance and cold intolerance  Hematopoietic/Lymphatic: Denies bleeding problems and blood transfusions    24-hour events:  None    Objective   OBJECTIVE:   BP (!) 102/58   Pulse 74   Temp 97.3 °F (36.3 °C) (Temporal)   Resp 19   Ht 1.6 m (5' 3\")   Wt 59.9 kg (132 lb)   LMP  (LMP Unknown)   SpO2 95%   BMI 23.38 kg/m²   SpO2 Readings from Last 1 Encounters:   02/05/24 95%        I/O:  No intake

## 2024-02-05 NOTE — DISCHARGE INSTRUCTIONS
=====================================  [facility]  DISCHARGE INSTRUCTIONS  =====================================    Take your medications as directed in this summary    Future scheduled appointments are listed below or recommended appointments are mentioned above.    Future Appointments   Date Time Provider Department Center   2/12/2024  1:40 PM Lauro Ellington MD [facility] [place] [facility]   2/13/2024  2:40 PM Efrain Roy, DO [facility]   2/21/2024  2:00 PM Jt Garsia MD [facility]   3/12/2024  2:40 PM Joya Garcia MD [place] [facility]   4/18/2024 11:15 AM Edu Olivas MD BD ENDO [facility]       Call  to confirm or schedule your appointment with Dr. Ellington,  as soon as possible and/or if there are any appointment changes or other issues.    It is important that you follow up with  for better monitoring of the reason of your hospitalization. Follow up with the specialists that saw you during your stay as well. If you have any questions call your PCP at [phone].    Once discharged from [facility], you can :     Return to work : Yes, you may return to work  Activity : As tolerated  Stairs : As tolerated  Exercise : As tolerated  Lifting : As tolerated   Sexual activity : Yes  Driving : With seat belt on. NO driving on narcotic pain medication if prescribed   Medications : Always take your medications as prescribed  Wound Care: none needed  Diet : You are asked to make an attempt to improve diet and exercise patterns to aid in medical management of your medical condition/problem.     Call [facility] with any further questions. Return to Emergency Department with any worsening of your condition and/or fever greater than 101 degrees, new weakness, shortness of breath or chest pain.             SURGERY DISCHARGE INSTRUCTIONS      Continue to take Miralax twice daily to prevent constipation. Do not use any suppositories or insert

## 2024-02-05 NOTE — PROGRESS NOTES
At approx: 1 :30 pm attempted t go over discharge medications, patient asked if she was getting pain medication, I explained that I seen no orders on her discharge paperwork. She asked why, message sent to family medicine and received reply at 1:40 pm that states did not want to give patient narcotic medications due the risk of constipation after her procedure. Over the counter tylenol will be okay. Patient notified at that time and stated she would have her  take care of it. While removing IV with discharge instructions in hand patient asked about home pain meds again and message sent to family medicine resident to come speak with patient.

## 2024-02-06 ENCOUNTER — TELEPHONE (OUTPATIENT)
Dept: FAMILY MEDICINE CLINIC | Age: 69
End: 2024-02-06

## 2024-02-06 LAB
EKG ATRIAL RATE: 72 BPM
EKG Q-T INTERVAL: 352 MS
EKG QRS DURATION: 80 MS
EKG QTC CALCULATION (BAZETT): 413 MS
EKG R AXIS: 89 DEGREES
EKG T AXIS: 35 DEGREES
EKG VENTRICULAR RATE: 83 BPM
SURGICAL PATHOLOGY REPORT: NORMAL

## 2024-02-06 PROCEDURE — 93010 ELECTROCARDIOGRAM REPORT: CPT | Performed by: INTERNAL MEDICINE

## 2024-02-06 NOTE — TELEPHONE ENCOUNTER
Care Transitions Initial Follow Up Call    Outreach made within 2 business days of discharge: Yes    Patient: Rebekah Rodriguez Patient : 1955   MRN: [id number]  Reason for Admission: There are no discharge diagnoses documented for the most recent discharge.  Discharge Date: 24       Spoke with: message left    Discharge department/facility: [facility]    Message left requesting return call and reminding of appt on 24    Scheduled appointment with PCP within 7-14 days    Follow Up  Future Appointments   Date Time Provider Department Center   2024  1:40 PM Lauro Ellington MD [place] [facility] [facility]   2024  2:40 PM Efrain Roy, DO [facility]   2024  2:00 PM Jt Garsia MD [facility]   3/12/2024  2:40 PM Joya Garcia MD [facility]   2024 11:15 AM Edu Olivas MD BDM ENDO [facility]       Goran Squires RN

## 2024-02-07 ENCOUNTER — TELEPHONE (OUTPATIENT)
Dept: FAMILY MEDICINE CLINIC | Age: 69
End: 2024-02-07

## 2024-02-07 NOTE — DISCHARGE SUMMARY
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    Altered mental status    BRBPR (bright red blood per rectum)    Severe pulmonary hypertension (HCC)    Pulmonary hypertension    Chronic kidney disease, stage III (moderate) (HCC)    Paroxysmal atrial fibrillation (HCC)    Mixed hyperlipidemia    Goals of care, counseling/discussion    Atrial fibrillation with RVR (HCC)    Chronic combined systolic and diastolic heart failure (HCC)    Chronic hypoxemic respiratory failure (HCC)    Mixed simple and mucopurulent chronic bronchitis (HCC)    Recurrent major depressive disorder, in partial remission (HCC)    Gait disturbance    Chest pain    Chronic, continuous use of opioids    PMB (postmenopausal bleeding)    Severe dysplasia of vagina    Epistaxis    Ventral hernia without obstruction or gangrene    Long term (current) use of systemic steroids    Nuclear senile cataract    Constipation    Abdominal pain    Small bowel obstruction (HCC)    Seizure (HCC)    Elevated troponin level    RVF (right ventricular failure) (HCC)    Severe protein-calorie malnutrition (HCC)    Hypokalemia    Primary hypertension    Acute combined systolic and diastolic CHF, NYHA class 1 (HCC)    Acute on chronic diastolic CHF (congestive heart failure) (HCC)    Acute on chronic respiratory failure (HCC)    Shortness of breath    Palliative care encounter    Palliative care by specialist    Congestive heart failure, unspecified HF chronicity, unspecified heart  well, pain was controlled on PO medications. Physical therapy evaluated and treated the patient and recommended continued skilled therapy to maximize functional mobility independence. Diet was advanced and she was tolerating a regular diet with no nausea or vomiting, and was in a suitable condition for discharge to Home.      Discharge plan:  Follow up with PCP Dr Ellington, as scheduled below. Monitor CBC, CMP and adequate wound healing postoperatively. Assess patient compliance with diet, bowel regimen and post-op recommendation of strict \"nothing per rectum\"  Follow up with General surgery, Dr Garsia as scheduled below  Follow up with Pulmonolgy   Follow up with Endocrinology  Continue to take all home medications as prescribed below.    Discharge Medications:         Medication List        START taking these medications      polyethylene glycol 17 g packet  Commonly known as: GLYCOLAX  Take 1 packet by mouth 2 times daily            CONTINUE taking these medications      albuterol (2.5 MG/3ML) 0.083% nebulizer solution  Commonly known as: PROVENTIL  Take 3 mLs by nebulization every 6 hours as needed for Wheezing or Shortness of Breath     amLODIPine 5 MG tablet  Commonly known as: NORVASC  Take 1 tablet by mouth daily     Brovana 15 MCG/2ML Nebu  Generic drug: arformoterol tartrate     budesonide 0.5 MG/2ML nebulizer suspension  Commonly known as: PULMICORT     desmopressin 0.2 MG tablet  Commonly known as: DDAVP  Take 1 tablet by mouth 2 times daily     digoxin 125 MCG tablet  Commonly known as: LANOXIN     DULoxetine 60 MG extended release capsule  Commonly known as: Cymbalta  Take 1 capsule by mouth daily     Eliquis 5 MG Tabs tablet  Generic drug: apixaban  Take 1 tablet by mouth 2 times daily     famotidine 20 MG tablet  Commonly known as: PEPCID  Take 1 tablet by mouth daily     ferrous sulfate 325 (65 Fe) MG tablet  Commonly known as: IRON 325     furosemide 20 MG tablet  Commonly known as: LASIX  Take 1

## 2024-02-07 NOTE — TELEPHONE ENCOUNTER
Care Transitions Initial Follow Up Call    Outreach made within 2 business days of discharge: Yes    Patient: Rebekah Rodriguez Patient : 1955   MRN: [id number]  Reason for Admission: There are no discharge diagnoses documented for the most recent discharge.  Discharge Date: 24       Spoke with: message left    Discharge department/facility: [facility]    Message left requesting return call and reminding of appt 24    Scheduled appointment with PCP within 7-14 days    Follow Up  Future Appointments   Date Time Provider Department Center   2024  1:40 PM Lauro Ellington MD [facility]   2024  2:40 PM Efrain Roy, DO [facility]   2024  2:00 PM Jt Garsia MD [facility]   3/12/2024  2:40 PM Joya Garcia MD [facility]   2024 11:15 AM Edu Olivas MD [facility] ENDO [facility]       Goran Squires RN

## 2024-02-12 ENCOUNTER — OFFICE VISIT (OUTPATIENT)
Dept: FAMILY MEDICINE CLINIC | Age: 69
End: 2024-02-12

## 2024-02-12 VITALS
TEMPERATURE: 97.7 F | DIASTOLIC BLOOD PRESSURE: 86 MMHG | SYSTOLIC BLOOD PRESSURE: 145 MMHG | WEIGHT: 132 LBS | OXYGEN SATURATION: 96 % | HEIGHT: 63 IN | BODY MASS INDEX: 23.39 KG/M2 | RESPIRATION RATE: 18 BRPM | HEART RATE: 77 BPM

## 2024-02-12 DIAGNOSIS — D63.8 ANEMIA DUE TO CHRONIC ILLNESS: ICD-10-CM

## 2024-02-12 DIAGNOSIS — K62.3 RECTAL PROLAPSE: Primary | ICD-10-CM

## 2024-02-12 DIAGNOSIS — G89.18 POST-OPERATIVE PAIN: ICD-10-CM

## 2024-02-12 DIAGNOSIS — E87.6 HYPOKALEMIA: ICD-10-CM

## 2024-02-12 LAB
BASOPHILS ABSOLUTE: 0 K/UL (ref 0–0.2)
BASOPHILS RELATIVE PERCENT: 0 % (ref 0–2)
EOSINOPHILS ABSOLUTE: 0.18 K/UL (ref 0.05–0.5)
EOSINOPHILS RELATIVE PERCENT: 3 % (ref 0–6)
HCT VFR BLD CALC: 30.2 % (ref 34–48)
HEMOGLOBIN: 9 G/DL (ref 11.5–15.5)
LYMPHOCYTES ABSOLUTE: 0.18 K/UL (ref 1.5–4)
LYMPHOCYTES RELATIVE PERCENT: 3 % (ref 20–42)
MCH RBC QN AUTO: 25.6 PG (ref 26–35)
MCHC RBC AUTO-ENTMCNC: 29.8 G/DL (ref 32–34.5)
MCV RBC AUTO: 85.8 FL (ref 80–99.9)
MONOCYTES ABSOLUTE: 0.43 K/UL (ref 0.1–0.95)
MONOCYTES RELATIVE PERCENT: 6 % (ref 2–12)
NEUTROPHILS ABSOLUTE: 6.2 K/UL (ref 1.8–7.3)
NEUTROPHILS RELATIVE PERCENT: 89 % (ref 43–80)
NUCLEATED RED BLOOD CELLS: 1 PER 100 WBC
PDW BLD-RTO: 17.7 % (ref 11.5–15)
PLATELET # BLD: 184 K/UL (ref 130–450)
PMV BLD AUTO: 11.9 FL (ref 7–12)
RBC # BLD: 3.52 M/UL (ref 3.5–5.5)
RBC # BLD: ABNORMAL 10*6/UL
WBC # BLD: 7 K/UL (ref 4.5–11.5)

## 2024-02-12 RX ORDER — POTASSIUM CHLORIDE 20 MEQ/1
20 TABLET, EXTENDED RELEASE ORAL DAILY
Qty: 60 TABLET | Refills: 0 | Status: SHIPPED | OUTPATIENT
Start: 2024-02-12

## 2024-02-12 RX ORDER — FOLIC ACID/MV,IRON,MIN/LUTEIN 0.4-18-25
TABLET ORAL
COMMUNITY
Start: 2024-01-23 | End: 2024-02-12

## 2024-02-12 RX ORDER — TIZANIDINE 2 MG/1
2 TABLET ORAL EVERY 6 HOURS PRN
Qty: 30 TABLET | Refills: 0 | Status: SHIPPED | OUTPATIENT
Start: 2024-02-12

## 2024-02-13 ENCOUNTER — TELEPHONE (OUTPATIENT)
Dept: FAMILY MEDICINE CLINIC | Age: 69
End: 2024-02-13

## 2024-02-13 PROBLEM — K21.9 GASTRO-ESOPHAGEAL REFLUX DISEASE WITHOUT ESOPHAGITIS: Status: ACTIVE | Noted: 2023-09-29

## 2024-02-16 ENCOUNTER — TELEPHONE (OUTPATIENT)
Dept: SURGERY | Age: 69
End: 2024-02-16

## 2024-02-16 NOTE — TELEPHONE ENCOUNTER
LPN spoke with patients  regarding upcoming appointment with Dr Garsia. Patient had under went recent Colonoscopy and PERINEAL RECTOSIGMOIDECTOMY with Dr Bae. LPN voiced patient does not need to see Dr Garsia, patient needs to follow up with Dr Bae as scheduled.     Electronically signed by Yolanda Dalton LPN on 2/16/24 at 11:24 AM EST

## 2024-02-20 ENCOUNTER — APPOINTMENT (OUTPATIENT)
Dept: GENERAL RADIOLOGY | Age: 69
End: 2024-02-20
Payer: MEDICARE

## 2024-02-20 ENCOUNTER — HOSPITAL ENCOUNTER (INPATIENT)
Age: 69
LOS: 3 days | Discharge: HOME | End: 2024-02-23
Attending: EMERGENCY MEDICINE | Admitting: FAMILY MEDICINE
Payer: MEDICARE

## 2024-02-20 DIAGNOSIS — R09.02 HYPOXIA: Primary | ICD-10-CM

## 2024-02-20 DIAGNOSIS — I50.9 ACUTE ON CHRONIC CONGESTIVE HEART FAILURE, UNSPECIFIED HEART FAILURE TYPE (HCC): ICD-10-CM

## 2024-02-20 DIAGNOSIS — R06.02 SHORTNESS OF BREATH: ICD-10-CM

## 2024-02-20 LAB
AADO2: 244.4 MMHG
ALBUMIN SERPL-MCNC: 3.5 G/DL (ref 3.5–5.2)
ALP SERPL-CCNC: 141 U/L (ref 35–104)
ALT SERPL-CCNC: 26 U/L (ref 0–32)
ANION GAP SERPL CALCULATED.3IONS-SCNC: 8 MMOL/L (ref 7–16)
AST SERPL-CCNC: 33 U/L (ref 0–31)
B PARAP IS1001 DNA NPH QL NAA+NON-PROBE: NOT DETECTED
B PERT DNA SPEC QL NAA+PROBE: NOT DETECTED
B.E.: 2.4 MMOL/L (ref -3–3)
B.E.: 3 MMOL/L (ref -3–3)
BASOPHILS # BLD: 0.01 K/UL (ref 0–0.2)
BASOPHILS NFR BLD: 0 % (ref 0–2)
BILIRUB SERPL-MCNC: 0.4 MG/DL (ref 0–1.2)
BNP SERPL-MCNC: 5316 PG/ML (ref 0–125)
BUN SERPL-MCNC: 11 MG/DL (ref 6–23)
C PNEUM DNA NPH QL NAA+NON-PROBE: NOT DETECTED
CALCIUM SERPL-MCNC: 8.6 MG/DL (ref 8.6–10.2)
CHLORIDE SERPL-SCNC: 105 MMOL/L (ref 98–107)
CO2 SERPL-SCNC: 28 MMOL/L (ref 22–29)
COHB: 0.4 % (ref 0–1.5)
COHB: 0.5 % (ref 0–1.5)
CREAT SERPL-MCNC: 0.9 MG/DL (ref 0.5–1)
CRITICAL: ABNORMAL
CRITICAL: ABNORMAL
DATE ANALYZED: ABNORMAL
DATE ANALYZED: ABNORMAL
DATE OF COLLECTION: ABNORMAL
DATE OF COLLECTION: ABNORMAL
EOSINOPHIL # BLD: 0.13 K/UL (ref 0.05–0.5)
EOSINOPHILS RELATIVE PERCENT: 3 % (ref 0–6)
ERYTHROCYTE [DISTWIDTH] IN BLOOD BY AUTOMATED COUNT: 18.2 % (ref 11.5–15)
FIO2: 100 %
FIO2: 60 %
FLUAV RNA NPH QL NAA+NON-PROBE: NOT DETECTED
FLUBV RNA NPH QL NAA+NON-PROBE: NOT DETECTED
GFR SERPL CREATININE-BSD FRML MDRD: >60 ML/MIN/1.73M2
GLUCOSE SERPL-MCNC: 97 MG/DL (ref 74–99)
HADV DNA NPH QL NAA+NON-PROBE: NOT DETECTED
HCO3: 27.6 MMOL/L (ref 22–26)
HCO3: 28.7 MMOL/L (ref 22–26)
HCOV 229E RNA NPH QL NAA+NON-PROBE: NOT DETECTED
HCOV HKU1 RNA NPH QL NAA+NON-PROBE: NOT DETECTED
HCOV NL63 RNA NPH QL NAA+NON-PROBE: NOT DETECTED
HCOV OC43 RNA NPH QL NAA+NON-PROBE: NOT DETECTED
HCT VFR BLD AUTO: 29.3 % (ref 34–48)
HGB BLD-MCNC: 8.6 G/DL (ref 11.5–15.5)
HHB: 0.3 % (ref 0–5)
HHB: 1.8 % (ref 0–5)
HMPV RNA NPH QL NAA+NON-PROBE: NOT DETECTED
HPIV1 RNA NPH QL NAA+NON-PROBE: NOT DETECTED
HPIV2 RNA NPH QL NAA+NON-PROBE: NOT DETECTED
HPIV3 RNA NPH QL NAA+NON-PROBE: NOT DETECTED
HPIV4 RNA NPH QL NAA+NON-PROBE: NOT DETECTED
IMM GRANULOCYTES # BLD AUTO: <0.03 K/UL (ref 0–0.58)
IMM GRANULOCYTES NFR BLD: 0 % (ref 0–5)
LAB: ABNORMAL
LAB: ABNORMAL
LYMPHOCYTES NFR BLD: 0.37 K/UL (ref 1.5–4)
LYMPHOCYTES RELATIVE PERCENT: 7 % (ref 20–42)
Lab: 1600
Lab: 1916
M PNEUMO DNA NPH QL NAA+NON-PROBE: NOT DETECTED
MCH RBC QN AUTO: 25.4 PG (ref 26–35)
MCHC RBC AUTO-ENTMCNC: 29.4 G/DL (ref 32–34.5)
MCV RBC AUTO: 86.7 FL (ref 80–99.9)
METHB: 0.5 % (ref 0–1.5)
METHB: 0.5 % (ref 0–1.5)
MODE: ABNORMAL
MODE: ABNORMAL
MONOCYTES NFR BLD: 0.36 K/UL (ref 0.1–0.95)
MONOCYTES NFR BLD: 7 % (ref 2–12)
NEUTROPHILS NFR BLD: 83 % (ref 43–80)
NEUTS SEG NFR BLD: 4.22 K/UL (ref 1.8–7.3)
O2 CONTENT: 14.3 ML/DL
O2 SATURATION: 98.2 % (ref 92–98.5)
O2 SATURATION: 99.7 % (ref 92–98.5)
O2HB: 97.2 % (ref 94–97)
O2HB: 98.8 % (ref 94–97)
OPERATOR ID: 1868
OPERATOR ID: 914
PATIENT TEMP: 37 C
PATIENT TEMP: 37 C
PCO2: 45.4 MMHG (ref 35–45)
PCO2: 48.9 MMHG (ref 35–45)
PEEP/CPAP: 6 CMH2O
PEEP/CPAP: 6 CMH2O
PFO2: 1.91 MMHG/%
PH BLOOD GAS: 7.39 (ref 7.35–7.45)
PH BLOOD GAS: 7.4 (ref 7.35–7.45)
PIP: 12 CMH2O
PLATELET # BLD AUTO: 168 K/UL (ref 130–450)
PMV BLD AUTO: 11.5 FL (ref 7–12)
PO2: 114.6 MMHG (ref 75–100)
PO2: >500 MMHG (ref 75–100)
POTASSIUM SERPL-SCNC: 3.6 MMOL/L (ref 3.5–5)
PROT SERPL-MCNC: 6 G/DL (ref 6.4–8.3)
PS: 12 CMH20
RBC # BLD AUTO: 3.38 M/UL (ref 3.5–5.5)
RBC # BLD: ABNORMAL 10*6/UL
RI(T): 2.13
RSV RNA NPH QL NAA+NON-PROBE: NOT DETECTED
RV+EV RNA NPH QL NAA+NON-PROBE: NOT DETECTED
SARS-COV-2 RNA NPH QL NAA+NON-PROBE: NOT DETECTED
SODIUM SERPL-SCNC: 141 MMOL/L (ref 132–146)
SOURCE, BLOOD GAS: ABNORMAL
SOURCE, BLOOD GAS: ABNORMAL
SPECIMEN DESCRIPTION: NORMAL
THB: 10.3 G/DL (ref 11.5–16.5)
THB: 9.9 G/DL (ref 11.5–16.5)
TIME ANALYZED: 1605
TIME ANALYZED: 1937
TROPONIN I SERPL HS-MCNC: 18 NG/L (ref 0–9)
TROPONIN I SERPL HS-MCNC: 19 NG/L (ref 0–9)
WBC OTHER # BLD: 5.1 K/UL (ref 4.5–11.5)

## 2024-02-20 PROCEDURE — 82805 BLOOD GASES W/O2 SATURATION: CPT

## 2024-02-20 PROCEDURE — 6360000002 HC RX W HCPCS

## 2024-02-20 PROCEDURE — 2580000003 HC RX 258

## 2024-02-20 PROCEDURE — 6370000000 HC RX 637 (ALT 250 FOR IP)

## 2024-02-20 PROCEDURE — 85025 COMPLETE CBC W/AUTO DIFF WBC: CPT

## 2024-02-20 PROCEDURE — 71045 X-RAY EXAM CHEST 1 VIEW: CPT

## 2024-02-20 PROCEDURE — 5A09457 ASSISTANCE WITH RESPIRATORY VENTILATION, 24-96 CONSECUTIVE HOURS, CONTINUOUS POSITIVE AIRWAY PRESSURE: ICD-10-PCS

## 2024-02-20 PROCEDURE — 96375 TX/PRO/DX INJ NEW DRUG ADDON: CPT

## 2024-02-20 PROCEDURE — 0202U NFCT DS 22 TRGT SARS-COV-2: CPT

## 2024-02-20 PROCEDURE — 99285 EMERGENCY DEPT VISIT HI MDM: CPT

## 2024-02-20 PROCEDURE — 80053 COMPREHEN METABOLIC PANEL: CPT

## 2024-02-20 PROCEDURE — 84484 ASSAY OF TROPONIN QUANT: CPT

## 2024-02-20 PROCEDURE — 2140000000 HC CCU INTERMEDIATE R&B

## 2024-02-20 PROCEDURE — 94664 DEMO&/EVAL PT USE INHALER: CPT

## 2024-02-20 PROCEDURE — 94640 AIRWAY INHALATION TREATMENT: CPT

## 2024-02-20 PROCEDURE — 83880 ASSAY OF NATRIURETIC PEPTIDE: CPT

## 2024-02-20 PROCEDURE — 96374 THER/PROPH/DIAG INJ IV PUSH: CPT

## 2024-02-20 PROCEDURE — 93005 ELECTROCARDIOGRAM TRACING: CPT

## 2024-02-20 RX ORDER — SILDENAFIL CITRATE 20 MG/1
10 TABLET ORAL 3 TIMES DAILY
Status: DISCONTINUED | OUTPATIENT
Start: 2024-02-20 | End: 2024-02-23 | Stop reason: HOSPADM

## 2024-02-20 RX ORDER — POTASSIUM CHLORIDE 20 MEQ/1
40 TABLET, EXTENDED RELEASE ORAL PRN
Status: DISCONTINUED | OUTPATIENT
Start: 2024-02-20 | End: 2024-02-23 | Stop reason: HOSPADM

## 2024-02-20 RX ORDER — METOPROLOL SUCCINATE 50 MG/1
50 TABLET, EXTENDED RELEASE ORAL 2 TIMES DAILY
Status: DISCONTINUED | OUTPATIENT
Start: 2024-02-20 | End: 2024-02-23 | Stop reason: HOSPADM

## 2024-02-20 RX ORDER — POLYETHYLENE GLYCOL 3350 17 G/17G
17 POWDER, FOR SOLUTION ORAL DAILY PRN
Status: DISCONTINUED | OUTPATIENT
Start: 2024-02-20 | End: 2024-02-23 | Stop reason: HOSPADM

## 2024-02-20 RX ORDER — LIDOCAINE 4 G/G
1 PATCH TOPICAL DAILY
Status: DISCONTINUED | OUTPATIENT
Start: 2024-02-21 | End: 2024-02-23 | Stop reason: HOSPADM

## 2024-02-20 RX ORDER — OMEGA-3-ACID ETHYL ESTERS 1 G/1
2 CAPSULE, LIQUID FILLED ORAL 2 TIMES DAILY
Status: DISCONTINUED | OUTPATIENT
Start: 2024-02-20 | End: 2024-02-23 | Stop reason: HOSPADM

## 2024-02-20 RX ORDER — HYDROCORTISONE 5 MG/1
15 TABLET ORAL EVERY MORNING
Status: DISCONTINUED | OUTPATIENT
Start: 2024-02-21 | End: 2024-02-23 | Stop reason: HOSPADM

## 2024-02-20 RX ORDER — IPRATROPIUM BROMIDE AND ALBUTEROL SULFATE 2.5; .5 MG/3ML; MG/3ML
3 SOLUTION RESPIRATORY (INHALATION) ONCE
Status: COMPLETED | OUTPATIENT
Start: 2024-02-20 | End: 2024-02-20

## 2024-02-20 RX ORDER — ONDANSETRON 4 MG/1
4 TABLET, ORALLY DISINTEGRATING ORAL EVERY 8 HOURS PRN
Status: DISCONTINUED | OUTPATIENT
Start: 2024-02-20 | End: 2024-02-23 | Stop reason: HOSPADM

## 2024-02-20 RX ORDER — VITAMIN B COMPLEX
1000 TABLET ORAL DAILY
Status: DISCONTINUED | OUTPATIENT
Start: 2024-02-21 | End: 2024-02-23 | Stop reason: HOSPADM

## 2024-02-20 RX ORDER — POTASSIUM CHLORIDE 20 MEQ/1
20 TABLET, EXTENDED RELEASE ORAL DAILY
Status: DISCONTINUED | OUTPATIENT
Start: 2024-02-21 | End: 2024-02-23 | Stop reason: HOSPADM

## 2024-02-20 RX ORDER — MULTIVITAMIN WITH IRON
1 TABLET ORAL DAILY
Status: DISCONTINUED | OUTPATIENT
Start: 2024-02-21 | End: 2024-02-23 | Stop reason: HOSPADM

## 2024-02-20 RX ORDER — SODIUM CHLORIDE 0.9 % (FLUSH) 0.9 %
5-40 SYRINGE (ML) INJECTION PRN
Status: DISCONTINUED | OUTPATIENT
Start: 2024-02-20 | End: 2024-02-23 | Stop reason: HOSPADM

## 2024-02-20 RX ORDER — SODIUM CHLORIDE 9 MG/ML
INJECTION, SOLUTION INTRAVENOUS PRN
Status: DISCONTINUED | OUTPATIENT
Start: 2024-02-20 | End: 2024-02-23 | Stop reason: HOSPADM

## 2024-02-20 RX ORDER — LEVETIRACETAM 500 MG/1
500 TABLET ORAL 2 TIMES DAILY
Status: DISCONTINUED | OUTPATIENT
Start: 2024-02-20 | End: 2024-02-23 | Stop reason: HOSPADM

## 2024-02-20 RX ORDER — HYDROCORTISONE 5 MG/1
5 TABLET ORAL NIGHTLY
Status: DISCONTINUED | OUTPATIENT
Start: 2024-02-20 | End: 2024-02-23 | Stop reason: HOSPADM

## 2024-02-20 RX ORDER — POTASSIUM CHLORIDE 7.45 MG/ML
10 INJECTION INTRAVENOUS PRN
Status: DISCONTINUED | OUTPATIENT
Start: 2024-02-20 | End: 2024-02-23 | Stop reason: HOSPADM

## 2024-02-20 RX ORDER — LEVOTHYROXINE SODIUM 0.05 MG/1
50 TABLET ORAL DAILY
Status: DISCONTINUED | OUTPATIENT
Start: 2024-02-21 | End: 2024-02-23 | Stop reason: HOSPADM

## 2024-02-20 RX ORDER — PREDNISONE 20 MG/1
40 TABLET ORAL
Status: DISCONTINUED | OUTPATIENT
Start: 2024-02-20 | End: 2024-02-20

## 2024-02-20 RX ORDER — POLYETHYLENE GLYCOL 3350 17 G/17G
17 POWDER, FOR SOLUTION ORAL 2 TIMES DAILY
Status: DISCONTINUED | OUTPATIENT
Start: 2024-02-20 | End: 2024-02-23 | Stop reason: HOSPADM

## 2024-02-20 RX ORDER — BUDESONIDE 0.5 MG/2ML
0.5 INHALANT ORAL
Status: DISCONTINUED | OUTPATIENT
Start: 2024-02-20 | End: 2024-02-23 | Stop reason: HOSPADM

## 2024-02-20 RX ORDER — DESMOPRESSIN ACETATE 0.1 MG/1
200 TABLET ORAL 2 TIMES DAILY
Status: DISCONTINUED | OUTPATIENT
Start: 2024-02-20 | End: 2024-02-23 | Stop reason: HOSPADM

## 2024-02-20 RX ORDER — SODIUM CHLORIDE 0.9 % (FLUSH) 0.9 %
5-40 SYRINGE (ML) INJECTION EVERY 12 HOURS SCHEDULED
Status: DISCONTINUED | OUTPATIENT
Start: 2024-02-20 | End: 2024-02-23 | Stop reason: HOSPADM

## 2024-02-20 RX ORDER — GUAIFENESIN 400 MG/1
400 TABLET ORAL 4 TIMES DAILY PRN
Status: DISCONTINUED | OUTPATIENT
Start: 2024-02-20 | End: 2024-02-23 | Stop reason: HOSPADM

## 2024-02-20 RX ORDER — ARFORMOTEROL TARTRATE 15 UG/2ML
15 SOLUTION RESPIRATORY (INHALATION)
Status: DISCONTINUED | OUTPATIENT
Start: 2024-02-20 | End: 2024-02-23 | Stop reason: HOSPADM

## 2024-02-20 RX ORDER — DIGOXIN 125 MCG
62.5 TABLET ORAL DAILY
Status: DISCONTINUED | OUTPATIENT
Start: 2024-02-21 | End: 2024-02-23 | Stop reason: HOSPADM

## 2024-02-20 RX ORDER — ONDANSETRON 2 MG/ML
4 INJECTION INTRAMUSCULAR; INTRAVENOUS EVERY 6 HOURS PRN
Status: DISCONTINUED | OUTPATIENT
Start: 2024-02-20 | End: 2024-02-23 | Stop reason: HOSPADM

## 2024-02-20 RX ORDER — 0.9 % SODIUM CHLORIDE 0.9 %
1000 INTRAVENOUS SOLUTION INTRAVENOUS ONCE
Status: COMPLETED | OUTPATIENT
Start: 2024-02-20 | End: 2024-02-20

## 2024-02-20 RX ORDER — ACETAMINOPHEN 500 MG
1000 TABLET ORAL EVERY 8 HOURS SCHEDULED
Status: DISCONTINUED | OUTPATIENT
Start: 2024-02-20 | End: 2024-02-23 | Stop reason: HOSPADM

## 2024-02-20 RX ORDER — FUROSEMIDE 20 MG/1
20 TABLET ORAL DAILY
Status: DISCONTINUED | OUTPATIENT
Start: 2024-02-21 | End: 2024-02-23 | Stop reason: HOSPADM

## 2024-02-20 RX ORDER — FUROSEMIDE 10 MG/ML
20 INJECTION INTRAMUSCULAR; INTRAVENOUS ONCE
Status: COMPLETED | OUTPATIENT
Start: 2024-02-20 | End: 2024-02-20

## 2024-02-20 RX ORDER — AMLODIPINE BESYLATE 5 MG/1
5 TABLET ORAL DAILY
Status: DISCONTINUED | OUTPATIENT
Start: 2024-02-21 | End: 2024-02-23 | Stop reason: HOSPADM

## 2024-02-20 RX ORDER — DULOXETIN HYDROCHLORIDE 30 MG/1
60 CAPSULE, DELAYED RELEASE ORAL DAILY
Status: DISCONTINUED | OUTPATIENT
Start: 2024-02-21 | End: 2024-02-23 | Stop reason: HOSPADM

## 2024-02-20 RX ORDER — FAMOTIDINE 20 MG/1
20 TABLET, FILM COATED ORAL DAILY
Status: DISCONTINUED | OUTPATIENT
Start: 2024-02-21 | End: 2024-02-23 | Stop reason: HOSPADM

## 2024-02-20 RX ORDER — MAGNESIUM SULFATE IN WATER 40 MG/ML
2000 INJECTION, SOLUTION INTRAVENOUS PRN
Status: DISCONTINUED | OUTPATIENT
Start: 2024-02-20 | End: 2024-02-23 | Stop reason: HOSPADM

## 2024-02-20 RX ORDER — FERROUS SULFATE 325(65) MG
325 TABLET ORAL 2 TIMES DAILY
Status: DISCONTINUED | OUTPATIENT
Start: 2024-02-20 | End: 2024-02-23 | Stop reason: HOSPADM

## 2024-02-20 RX ORDER — PREDNISONE 20 MG/1
40 TABLET ORAL
Status: DISCONTINUED | OUTPATIENT
Start: 2024-02-21 | End: 2024-02-21

## 2024-02-20 RX ADMIN — SODIUM CHLORIDE 1000 ML: 9 INJECTION, SOLUTION INTRAVENOUS at 15:52

## 2024-02-20 RX ADMIN — IPRATROPIUM BROMIDE AND ALBUTEROL SULFATE 3 DOSE: .5; 2.5 SOLUTION RESPIRATORY (INHALATION) at 15:39

## 2024-02-20 RX ADMIN — FUROSEMIDE 20 MG: 10 INJECTION, SOLUTION INTRAMUSCULAR; INTRAVENOUS at 17:14

## 2024-02-20 RX ADMIN — BUDESONIDE 500 MCG: 0.5 INHALANT RESPIRATORY (INHALATION) at 22:03

## 2024-02-20 RX ADMIN — WATER 125 MG: 1 INJECTION INTRAMUSCULAR; INTRAVENOUS; SUBCUTANEOUS at 15:49

## 2024-02-20 RX ADMIN — ARFORMOTEROL TARTRATE 15 MCG: 15 SOLUTION RESPIRATORY (INHALATION) at 22:03

## 2024-02-20 RX ADMIN — LEVETIRACETAM 500 MG: 500 TABLET, FILM COATED ORAL at 23:16

## 2024-02-20 RX ADMIN — METOPROLOL SUCCINATE 50 MG: 50 TABLET, EXTENDED RELEASE ORAL at 23:16

## 2024-02-20 RX ADMIN — ACETAMINOPHEN 1000 MG: 500 TABLET ORAL at 23:16

## 2024-02-20 RX ADMIN — Medication 10 ML: at 22:01

## 2024-02-20 RX ADMIN — FERROUS SULFATE TAB 325 MG (65 MG ELEMENTAL FE) 325 MG: 325 (65 FE) TAB at 23:16

## 2024-02-20 RX ADMIN — APIXABAN 5 MG: 5 TABLET, FILM COATED ORAL at 23:16

## 2024-02-20 ASSESSMENT — LIFESTYLE VARIABLES: HOW OFTEN DO YOU HAVE A DRINK CONTAINING ALCOHOL: NEVER

## 2024-02-20 ASSESSMENT — PAIN - FUNCTIONAL ASSESSMENT: PAIN_FUNCTIONAL_ASSESSMENT: NONE - DENIES PAIN

## 2024-02-20 NOTE — ED PROVIDER NOTES
97.2 (H) 94.0 - 97.0 %    COHb 0.5 0.0 - 1.5 %    MetHb 0.5 0.0 - 1.5 %    O2 Content 14.3 mL/dL    HHb 1.8 0.0 - 5.0 %    tHb (est) 10.3 (L) 11.5 - 16.5 g/dL    Mode BIPAP     FIO2 60.0 %    Peep/Cpap 6.0 cmH2O    PIP 12.0 cmH2O    Date Of Collection 20240220     Time Collected 1916     Pt Temp 37.0 C     ID [id number]     Lab [id number]     Critical(s) Notified . No Critical Values    EKG 12 Lead   Result Value Ref Range    Ventricular Rate 55 BPM    Atrial Rate 51 BPM    QRS Duration 82 ms    Q-T Interval 414 ms    QTc Calculation (Bazett) 396 ms    R Axis 50 degrees    T Axis 158 degrees       RADIOLOGY:  Imaging Interpreted by Radiologist, initial wet read of imaging interpreted by myself  XR CHEST PORTABLE   Final Result   1. Interval progression cardiomegaly with pulmonary vascular congestion.   2. Hazy density in both lungs could represent pulmonary edema.   3. Small left pleural effusion.               EKG:    See ED Course for EKG documentation        ------------------------- NURSING NOTES AND VITALS REVIEWED ---------------------------   The nursing notes within the ED encounter and vital signs as below have been reviewed by myself.  BP (!) 142/97   Pulse 76   Temp 97.7 °F (36.5 °C)   Resp 23   Wt 59 kg (130 lb)   LMP  (LMP Unknown)   SpO2 100%   BMI 23.78 kg/m²   Oxygen Saturation Interpretation: Normal    The patient’s available past medical records and past encounters were reviewed, see MDM for details.        ------------------------------ ED COURSE/MEDICAL DECISION MAKING----------------------  Medications   sodium chloride flush 0.9 % injection 5-40 mL (10 mLs IntraVENous Given 2/20/24 2201)   sodium chloride flush 0.9 % injection 5-40 mL (has no administration in time range)   0.9 % sodium chloride infusion (has no administration in time range)   potassium chloride (KLOR-CON M) extended release tablet 40 mEq (has no administration in time range)     Or   potassium bicarb-citric acid (EFFER-K)  thus unlikely ACS.  RFA was all negative.  Chest x-ray revealing significant pulmonary edema this patient given 20 of Lasix IV.  She was immediately given 3 DuoNebs and 125 Solu-Medrol for concern for COPD exacerbation and she did have decreased air movement.  Did have improvement after these treatments.  Plan to admit for further evaluation management.  She is understanding and agreeable to plan.  Spoke with family medicine who agreed to admit.    Amount and/or Complexity of Data Reviewed  Labs: ordered.  Radiology: ordered and independent interpretation performed.  ECG/medicine tests: ordered and independent interpretation performed.    Risk  Prescription drug management.  Decision regarding hospitalization.        History From: patient and EMS    Social Determinants of Health : None    Chronic Conditions affecting care: Rebekah is a 68 y.o. 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/26/2007), Backache, Benign hypertension, CHF (congestive heart failure) ([facility]), Chronic back pain, Chronic kidney disease, Chronic pain disorder, Chronic, continuous use of opioids, Compression fracture of thoracic vertebra ([facility]), COPD (chronic obstructive pulmonary disease) ([facility]), Debility, Deformity of ankle and foot, acquired (01/31/2011), Depression, Diabetes insipidus ([facility]), Diverticulitis, Dry eyes, Encephalopathy acute (05/05/2017), Gait disturbance, GERD (gastroesophageal reflux disease), Hemorrhoids (01/13/2012), Hernia, History of blood transfusion (approx 05/2017), History of Clostridium difficile, Hyperlipidemia, Hyperthyroidism, Hypothyroidism, Ischemic colitis, enteritis, or enterocolitis ([facility]), Long term (current) use of systemic steroids (07/10/2022), Long-term current use of steroids, Lumbar disc herniation,

## 2024-02-21 LAB
ANION GAP SERPL CALCULATED.3IONS-SCNC: 9 MMOL/L (ref 7–16)
BACTERIA URNS QL MICRO: ABNORMAL
BASOPHILS # BLD: 0 K/UL (ref 0–0.2)
BASOPHILS NFR BLD: 0 % (ref 0–2)
BILIRUB UR QL STRIP: NEGATIVE
BUN SERPL-MCNC: 11 MG/DL (ref 6–23)
CALCIUM SERPL-MCNC: 8.8 MG/DL (ref 8.6–10.2)
CHLORIDE SERPL-SCNC: 102 MMOL/L (ref 98–107)
CLARITY UR: CLEAR
CO2 SERPL-SCNC: 31 MMOL/L (ref 22–29)
COLOR UR: YELLOW
CREAT SERPL-MCNC: 0.8 MG/DL (ref 0.5–1)
EKG ATRIAL RATE: 51 BPM
EKG Q-T INTERVAL: 414 MS
EKG QRS DURATION: 82 MS
EKG QTC CALCULATION (BAZETT): 396 MS
EKG R AXIS: 50 DEGREES
EKG T AXIS: 158 DEGREES
EKG VENTRICULAR RATE: 55 BPM
EOSINOPHIL # BLD: 0.03 K/UL (ref 0.05–0.5)
EOSINOPHILS RELATIVE PERCENT: 1 % (ref 0–6)
EPI CELLS #/AREA URNS HPF: ABNORMAL /HPF
ERYTHROCYTE [DISTWIDTH] IN BLOOD BY AUTOMATED COUNT: 18 % (ref 11.5–15)
GFR SERPL CREATININE-BSD FRML MDRD: >60 ML/MIN/1.73M2
GLUCOSE SERPL-MCNC: 74 MG/DL (ref 74–99)
GLUCOSE UR STRIP-MCNC: NEGATIVE MG/DL
HCT VFR BLD AUTO: 30.3 % (ref 34–48)
HGB BLD-MCNC: 8.9 G/DL (ref 11.5–15.5)
HGB UR QL STRIP.AUTO: NEGATIVE
KETONES UR STRIP-MCNC: NEGATIVE MG/DL
LEUKOCYTE ESTERASE UR QL STRIP: NEGATIVE
LYMPHOCYTES NFR BLD: 0.38 K/UL (ref 1.5–4)
LYMPHOCYTES RELATIVE PERCENT: 11 % (ref 20–42)
MAGNESIUM SERPL-MCNC: 1.8 MG/DL (ref 1.6–2.6)
MCH RBC QN AUTO: 25.5 PG (ref 26–35)
MCHC RBC AUTO-ENTMCNC: 29.4 G/DL (ref 32–34.5)
MCV RBC AUTO: 86.8 FL (ref 80–99.9)
MONOCYTES NFR BLD: 0.18 K/UL (ref 0.1–0.95)
MONOCYTES NFR BLD: 5 % (ref 2–12)
NEUTROPHILS NFR BLD: 83 % (ref 43–80)
NEUTS SEG NFR BLD: 2.81 K/UL (ref 1.8–7.3)
NITRITE UR QL STRIP: NEGATIVE
NUCLEATED RED BLOOD CELLS: 4 PER 100 WBC
PH UR STRIP: 6 [PH] (ref 5–9)
PLATELET # BLD AUTO: 167 K/UL (ref 130–450)
PMV BLD AUTO: 11.7 FL (ref 7–12)
POTASSIUM SERPL-SCNC: 3.3 MMOL/L (ref 3.5–5)
PROT UR STRIP-MCNC: NEGATIVE MG/DL
RBC # BLD AUTO: 3.49 M/UL (ref 3.5–5.5)
RBC # BLD: ABNORMAL 10*6/UL
RBC #/AREA URNS HPF: ABNORMAL /HPF
SODIUM SERPL-SCNC: 142 MMOL/L (ref 132–146)
SP GR UR STRIP: 1.02 (ref 1–1.03)
UROBILINOGEN UR STRIP-ACNC: 0.2 EU/DL (ref 0–1)
WBC #/AREA URNS HPF: ABNORMAL /HPF
WBC OTHER # BLD: 3.4 K/UL (ref 4.5–11.5)

## 2024-02-21 PROCEDURE — 85025 COMPLETE CBC W/AUTO DIFF WBC: CPT

## 2024-02-21 PROCEDURE — 2580000003 HC RX 258

## 2024-02-21 PROCEDURE — 81001 URINALYSIS AUTO W/SCOPE: CPT

## 2024-02-21 PROCEDURE — 36415 COLL VENOUS BLD VENIPUNCTURE: CPT

## 2024-02-21 PROCEDURE — 6370000000 HC RX 637 (ALT 250 FOR IP)

## 2024-02-21 PROCEDURE — 93010 ELECTROCARDIOGRAM REPORT: CPT | Performed by: INTERNAL MEDICINE

## 2024-02-21 PROCEDURE — 6360000002 HC RX W HCPCS

## 2024-02-21 PROCEDURE — 94640 AIRWAY INHALATION TREATMENT: CPT

## 2024-02-21 PROCEDURE — 80048 BASIC METABOLIC PNL TOTAL CA: CPT

## 2024-02-21 PROCEDURE — 94660 CPAP INITIATION&MGMT: CPT

## 2024-02-21 PROCEDURE — 2140000000 HC CCU INTERMEDIATE R&B

## 2024-02-21 PROCEDURE — 83735 ASSAY OF MAGNESIUM: CPT

## 2024-02-21 PROCEDURE — 99223 1ST HOSP IP/OBS HIGH 75: CPT | Performed by: FAMILY MEDICINE

## 2024-02-21 RX ORDER — FUROSEMIDE 10 MG/ML
40 INJECTION INTRAMUSCULAR; INTRAVENOUS 2 TIMES DAILY
Status: DISCONTINUED | OUTPATIENT
Start: 2024-02-21 | End: 2024-02-23

## 2024-02-21 RX ORDER — TRAMADOL HYDROCHLORIDE 50 MG/1
50 TABLET ORAL ONCE
Status: COMPLETED | OUTPATIENT
Start: 2024-02-21 | End: 2024-02-21

## 2024-02-21 RX ORDER — POTASSIUM CHLORIDE 20 MEQ/1
20 TABLET, EXTENDED RELEASE ORAL ONCE
Status: COMPLETED | OUTPATIENT
Start: 2024-02-21 | End: 2024-02-21

## 2024-02-21 RX ORDER — IPRATROPIUM BROMIDE AND ALBUTEROL SULFATE 2.5; .5 MG/3ML; MG/3ML
1 SOLUTION RESPIRATORY (INHALATION)
Status: DISCONTINUED | OUTPATIENT
Start: 2024-02-21 | End: 2024-02-23 | Stop reason: HOSPADM

## 2024-02-21 RX ORDER — ALBUTEROL SULFATE 2.5 MG/3ML
2.5 SOLUTION RESPIRATORY (INHALATION) EVERY 6 HOURS PRN
Status: DISCONTINUED | OUTPATIENT
Start: 2024-02-21 | End: 2024-02-23 | Stop reason: HOSPADM

## 2024-02-21 RX ORDER — DESMOPRESSIN ACETATE 0.2 MG/1
0.2 TABLET ORAL 2 TIMES DAILY
COMMUNITY

## 2024-02-21 RX ORDER — POLYETHYLENE GLYCOL 3350 17 G/17G
17 POWDER, FOR SOLUTION ORAL DAILY
COMMUNITY

## 2024-02-21 RX ADMIN — MULTIVITAMIN TABLET 1 TABLET: TABLET at 08:22

## 2024-02-21 RX ADMIN — ACETAMINOPHEN 1000 MG: 500 TABLET ORAL at 07:17

## 2024-02-21 RX ADMIN — METOPROLOL SUCCINATE 50 MG: 50 TABLET, EXTENDED RELEASE ORAL at 08:23

## 2024-02-21 RX ADMIN — PREDNISONE 40 MG: 20 TABLET ORAL at 12:25

## 2024-02-21 RX ADMIN — FUROSEMIDE 20 MG: 20 TABLET ORAL at 08:23

## 2024-02-21 RX ADMIN — LEVETIRACETAM 500 MG: 500 TABLET, FILM COATED ORAL at 08:22

## 2024-02-21 RX ADMIN — BUDESONIDE 500 MCG: 0.5 INHALANT RESPIRATORY (INHALATION) at 08:14

## 2024-02-21 RX ADMIN — ARFORMOTEROL TARTRATE 15 MCG: 15 SOLUTION RESPIRATORY (INHALATION) at 08:14

## 2024-02-21 RX ADMIN — AMLODIPINE BESYLATE 5 MG: 5 TABLET ORAL at 08:22

## 2024-02-21 RX ADMIN — ACETAMINOPHEN 1000 MG: 500 TABLET ORAL at 16:12

## 2024-02-21 RX ADMIN — ARFORMOTEROL TARTRATE 15 MCG: 15 SOLUTION RESPIRATORY (INHALATION) at 20:19

## 2024-02-21 RX ADMIN — OMEGA-3-ACID ETHYL ESTERS 2 G: 1 CAPSULE, LIQUID FILLED ORAL at 22:51

## 2024-02-21 RX ADMIN — DESMOPRESSIN ACETATE 200 MCG: 0.1 TABLET ORAL at 22:51

## 2024-02-21 RX ADMIN — HYDROCORTISONE 5 MG: 5 TABLET ORAL at 22:51

## 2024-02-21 RX ADMIN — Medication 10 ML: at 10:16

## 2024-02-21 RX ADMIN — LEVETIRACETAM 500 MG: 500 TABLET, FILM COATED ORAL at 21:52

## 2024-02-21 RX ADMIN — APIXABAN 5 MG: 5 TABLET, FILM COATED ORAL at 08:22

## 2024-02-21 RX ADMIN — APIXABAN 5 MG: 5 TABLET, FILM COATED ORAL at 21:52

## 2024-02-21 RX ADMIN — ACETAMINOPHEN 1000 MG: 500 TABLET ORAL at 21:52

## 2024-02-21 RX ADMIN — Medication 1000 UNITS: at 08:23

## 2024-02-21 RX ADMIN — BUDESONIDE 500 MCG: 0.5 INHALANT RESPIRATORY (INHALATION) at 20:19

## 2024-02-21 RX ADMIN — METOPROLOL SUCCINATE 50 MG: 50 TABLET, EXTENDED RELEASE ORAL at 21:52

## 2024-02-21 RX ADMIN — DESMOPRESSIN ACETATE 200 MCG: 0.1 TABLET ORAL at 10:08

## 2024-02-21 RX ADMIN — IPRATROPIUM BROMIDE AND ALBUTEROL SULFATE 1 DOSE: .5; 2.5 SOLUTION RESPIRATORY (INHALATION) at 17:17

## 2024-02-21 RX ADMIN — SODIUM CHLORIDE, PRESERVATIVE FREE 10 ML: 5 INJECTION INTRAVENOUS at 18:13

## 2024-02-21 RX ADMIN — LEVOTHYROXINE SODIUM 50 MCG: 0.05 TABLET ORAL at 08:23

## 2024-02-21 RX ADMIN — OMEGA-3-ACID ETHYL ESTERS 2 G: 1 CAPSULE, LIQUID FILLED ORAL at 10:09

## 2024-02-21 RX ADMIN — IPRATROPIUM BROMIDE AND ALBUTEROL SULFATE 1 DOSE: .5; 2.5 SOLUTION RESPIRATORY (INHALATION) at 20:19

## 2024-02-21 RX ADMIN — POTASSIUM CHLORIDE 20 MEQ: 1500 TABLET, EXTENDED RELEASE ORAL at 10:08

## 2024-02-21 RX ADMIN — FERROUS SULFATE TAB 325 MG (65 MG ELEMENTAL FE) 325 MG: 325 (65 FE) TAB at 08:24

## 2024-02-21 RX ADMIN — TRAMADOL HYDROCHLORIDE 50 MG: 50 TABLET ORAL at 12:25

## 2024-02-21 RX ADMIN — DIGOXIN 62.5 MCG: 125 TABLET ORAL at 08:23

## 2024-02-21 RX ADMIN — Medication 10 ML: at 21:52

## 2024-02-21 RX ADMIN — FUROSEMIDE 40 MG: 10 INJECTION, SOLUTION INTRAMUSCULAR; INTRAVENOUS at 18:12

## 2024-02-21 RX ADMIN — SILDENAFIL 10 MG: 20 TABLET, FILM COATED ORAL at 22:51

## 2024-02-21 RX ADMIN — DULOXETINE HYDROCHLORIDE 60 MG: 60 CAPSULE, DELAYED RELEASE ORAL at 08:23

## 2024-02-21 RX ADMIN — FERROUS SULFATE TAB 325 MG (65 MG ELEMENTAL FE) 325 MG: 325 (65 FE) TAB at 21:52

## 2024-02-21 RX ADMIN — POTASSIUM CHLORIDE 20 MEQ: 1500 TABLET, EXTENDED RELEASE ORAL at 08:23

## 2024-02-21 RX ADMIN — SILDENAFIL 10 MG: 20 TABLET, FILM COATED ORAL at 10:09

## 2024-02-21 RX ADMIN — FAMOTIDINE 20 MG: 20 TABLET, FILM COATED ORAL at 08:23

## 2024-02-21 RX ADMIN — SILDENAFIL 10 MG: 20 TABLET, FILM COATED ORAL at 17:13

## 2024-02-21 RX ADMIN — HYDROCORTISONE 15 MG: 5 TABLET ORAL at 10:08

## 2024-02-21 ASSESSMENT — PAIN DESCRIPTION - PAIN TYPE: TYPE: ACUTE PAIN

## 2024-02-21 ASSESSMENT — PAIN DESCRIPTION - LOCATION: LOCATION: CHEST

## 2024-02-21 ASSESSMENT — PAIN DESCRIPTION - DESCRIPTORS: DESCRIPTORS: ACHING

## 2024-02-21 ASSESSMENT — PAIN SCALES - GENERAL: PAINLEVEL_OUTOF10: 8

## 2024-02-21 ASSESSMENT — PAIN DESCRIPTION - FREQUENCY: FREQUENCY: CONTINUOUS

## 2024-02-21 ASSESSMENT — PAIN DESCRIPTION - ORIENTATION: ORIENTATION: MID

## 2024-02-21 NOTE — PROGRESS NOTES
4 Eyes Skin Assessment     NAME:  Rebekah Rodriguez  YOB: 1955  MEDICAL RECORD NUMBER:  [id number]    The patient is being assessed for  Admission    I agree that at least one RN has performed a thorough Head to Toe Skin Assessment on the patient. ALL assessment sites listed below have been assessed.      Areas assessed by both nurses:    Head, Face, Ears, Shoulders, Back, Chest, Arms, Elbows, Hands, Sacrum. Buttock, Coccyx, Ischium, Legs. Feet and Heels, and Under Medical Devices         Does the Patient have a Wound? Yes wound(s) were present on assessment. LDA wound assessment was Initiated and completed by RN    Skin tear left abdomen fold       Blas Prevention initiated by RN: Yes  Wound Care Orders initiated by RN: No    Pressure Injury (Stage 3,4, Unstageable, DTI, NWPT, and Complex wounds) if present, place Wound referral order by RN under : No    New Ostomies, if present place, Ostomy referral order under : No     Nurse 1 eSignature: Electronically signed by Kaia Chapin RN on 2/21/24 at 6:52 PM EST    **SHARE this note so that the co-signing nurse can place an eSignature**    Nurse 2 eSignature: {Esignature:[id number]}

## 2024-02-21 NOTE — CONSULTS
Cecilio Donovan M.D.,[facility]  Shaheed Agudelo D.O., GAB., [facility]  Joseph Agarwal M.D.  Abby Black M.D.   Efrain Roy D.O.      Patient:  Rebekah Rodriguez 68 y.o. female MRN: [id number]     Date of Service: 2/21/2024      PULMONARY CONSULTATION    Reason for Consultation: COPD exacerbation, pulmonary HTN whether need txt in [facility]  Referring Physician: Debra Neal MD    Communication with the referring physician will be sent via the electronic medical record.    Chief Complaint: shortness of breath    CODE STATUS: FULL CODE    SUBJECTIVE:  HPI:  Rebekah Rodriguez is a 68 y.o. AA female who we are asked to evaluate for her known COPD and pulmonary hypertension.  She is well-known to our service followed during multiple hospital admissions and in office.       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     Her pulmonary history includes-advanced stage III severe pulmonary arterial hypertension.  She has also followed with [facility] for this.  Based on prior right heart catheterization June 22, 2023 she was found to have combined pre and postcapillary PAH with borderline preserved cardiac index.  Negative nitric oxide response.  Stage IV pulmonary sarcoidosis diagnosed in 1998, moderate Gold stage II COPD on chronic home oxygen now at 6 L nasal cannula, LINDSAY on noninvasive ventilation in the form of CPAP 8 cmH2O pressure.  The PVR had increased from 7.80 POSEY to 9.41 POSEY, while the PAWP decreased from 25 to 18 mmHg. The worsening PVR was due to

## 2024-02-21 NOTE — CARE COORDINATION
Social Work /Transition of Care:    Pt presented to the ED secondary to shortness of breath from home.    Pt is admitted inpatient with CHF.  Pt has a consult for pulmonology.     SW met with pt who was laying on her side, alert and oriented x3, on bipap.  Pt has a sitter at the bedside due to pt initially pulling at wires.  Pt reports she and her  live in a one floor home with a ramp at the entrance.  Pt has home oxygen and a cpap through [employer].  Pt reports wearing her cpap regularly.  Pt is currently active with [facility].  Pt has no hx of FRITZ.  Pt reports she has a 's license but has not been driving and that her  usually takes her places.  Pt's PCP is Dr Ellington at [facility] on [address] and she uses [facility] to fill her prescriptions.  Pt reports she has a cardiologist in [place].  Pt plan is to return home upon discharge and resume HHC with [facility].  Keena from [facility] is following.  ILEANA/KAREEN to follow.

## 2024-02-21 NOTE — H&P
1/26/2024  EXAMINATION: ONE XRAY VIEW OF THE CHEST 1/26/2024 3:39 am COMPARISON: December 30, 2023 HISTORY: ORDERING SYSTEM PROVIDED HISTORY: sob TECHNOLOGIST PROVIDED HISTORY: Reason for exam:->sob What reading provider will be dictating this exam?->CRC FINDINGS: Cardiomegaly.  Patchy bilateral perihilar and interstitial pulmonary infiltrates suggesting mild pulmonary edema, similar to the prior examination.  Basilar interstitial changes.  Body wall soft tissues unremarkable. Osseous thorax intact.  Lower thoracic vertebroplasty noted. Chronic erosion of the right humeral head.     Cardiomegaly with mild pulmonary edema and basilar interstitial change, similar to the prior examination. RECOMMENDATION: Careful clinical correlation and follow up recommended.     CT ABDOMEN PELVIS W IV CONTRAST Additional Contrast? None    Result Date: 1/23/2024  EXAMINATION: CT OF THE ABDOMEN AND PELVIS WITH CONTRAST 1/23/2024 3:44 am TECHNIQUE: CT of the abdomen and pelvis was performed with the administration of intravenous contrast. Multiplanar reformatted images are provided for review. Automated exposure control, iterative reconstruction, and/or weight based adjustment of the mA/kV was utilized to reduce the radiation dose to as low as reasonably achievable. COMPARISON: None. HISTORY: ORDERING SYSTEM PROVIDED HISTORY: rectal pain TECHNOLOGIST PROVIDED HISTORY: Additional Contrast?->None Reason for exam:->rectal pain Decision Support Exception - unselect if not a suspected or confirmed emergency medical condition->Emergency Medical Condition (MA) What reading provider will be dictating this exam?->CRC FINDINGS: Lower Chest: Pulmonary interstitial architectural distortion suggesting COPD. Cardiomegaly.  No infiltrate or effusion.  Small fatty right posterior diaphragmatic hernia. Organs: Liver, gallbladder, biliary tree, bilateral adrenal glands, bilateral kidneys, spleen and pancreas are normal. GI/Bowel: No ileus or obstruction.   Mild mucosal enhancement and wall thickening of the distal sigmoid and rectum compatible with proctocolitis. Proximal colectomy.  Mild descending and sigmoid colonic diverticulosis. Uncomplicated right lower quadrant ileocolic anastomosis. Pelvis: No adnexal mass or pelvic free fluid. Peritoneum/Retroperitoneum: Wide-mouth ventral hernia containing loops of small and large bowel without complication.  Normal caliber and contour of the aorta.  No adenopathy or ascites. Bones/Soft Tissues: Degenerative thoracolumbar dextroscoliosis.  Severe disc and facet degenerative change at L4-L5 with 7 mm degenerative anterolisthesis L4 on L5.  There is likely moderate to severe central canal stenosis at this level.  Otherwise, diffuse mild spondylosis.  Chronic moderate burst/compression deformity of T11 status post vertebroplasty.     1. Mild proctocolitis. 2. Other incidental findings as above. RECOMMENDATIONS: Careful clinical correlation and follow up recommended.       Resident's Assessment and Plan     Rbeekah Rodriguez is a 68 y.o. female    Principal Problem:    Congestive heart failure of unknown etiology (HCC)  Resolved Problems:    * No resolved hospital problems. *    Acute on chronic respiratory failure; Hx of severe pulmonary HTN on Revatio, pulmonary sarcoidosis stage 4  Chronically on 6 L of oxygen at home, BiPAP at night and during nap time in the day  Scheduled Pulmicort and Brovana inpatient  Educated patient on compliance  Prednisone 40 mg burst for 5 days  Consult pulmonology for further recommendations on pulmonary hypertension, whether need input from [facility]    Pain in chest wall  Scheduled Tylenol 1000 mg every 8 hours and lidocaine patches  Educated patient on risk of opioids and respiratory depression  Patient on duloxetine 60 mg daily, consider increasing?    Normocytic anemia 2/2 anemia of chronic dx  Continue ferrous sulfate 325 daily  Monitor CBC daily SHB and admission 8.6    Hx of adrenal

## 2024-02-21 NOTE — ED NOTES
Pt continuously yelling out and removing bipap. Multiple attempts made to redirect patient with no success.

## 2024-02-21 NOTE — PROGRESS NOTES
02/21/24 1728   NIV Type   NIV Started/Stopped On   Equipment Type v60   Mode Bilevel   Mask Type Full face mask   Mask Size Small   Settings/Measurements   PIP Observed 14 cm H20   IPAP 14 cmH20   CPAP/EPAP 6 cmH2O   Vt (Measured) 394 mL   Rate Ordered 16   FiO2  45 %   I Time/ I Time % 0.8 s   Minute Volume (L/min) 11 Liters   Mask Leak (lpm) 41 lpm   Patient's Home Machine No   Alarm Settings   Alarms On Y   Low Pressure (cmH2O) 5 cmH2O   High Pressure (cmH2O) 30 cmH2O   RR Low (bpm) 14   RR High (bpm) 40 br/min   Patient Observation   Observations red outlet and oxygen plugged in

## 2024-02-21 NOTE — ED NOTES
Pt continuing to rip bipap off, removing cardiac monitor. This RN has made many attempts for verbal redirection with no success. Charge RN notified of need for safety sitter at this time.

## 2024-02-21 NOTE — PROGRESS NOTES
[facility] - Family Medicine Inpatient   Resident Progress Note    S:  Hospital day: 1   Brief Synopsis: Rebekah Rodriguez is a 68 y.o. 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 No inciting event, felt short of breath lately and needing BiPAP more at home. Admits to non compliance with nebulizers. In the ED, vitals were reassuring. ABG initiall of pH of 7.4, pCO2 of 45.4 and pO2 >500. ProBNP at 5k, previous admission was at 8k. CXR with chronic processes. She was given a dose of Lasix 20mg IV, DuoNebs x3 and solumedrol 125mg IV. Patient admitted for acute respiratory failure with hypoxia and hypercapnia.    Overnight/interim:   Did not receive Revatio, hydrocortisone 5mg dose due to \"med not available\"  Patient laying comfortably with BiPAP, transitioned to 6L O2 to take oral medications  Still complaining of pain in the chest wall despite tylenol and lidocaine patch. Add one dose of Ultram 50mg        Cont meds:    sodium chloride       Scheduled meds:    sodium chloride flush  5-40 mL IntraVENous 2 times per day    amLODIPine  5 mg Oral Daily    arformoterol tartrate  15 mcg Nebulization BID RT    budesonide  0.5 mg Nebulization BID RT    desmopressin  200 mcg Oral BID    digoxin  62.5 mcg Oral Daily    DULoxetine  60 mg Oral Daily    apixaban  5 mg Oral BID    famotidine  20 mg Oral Daily    ferrous sulfate  325 mg Oral BID    furosemide  20 mg Oral Daily    hydrocortisone  5 mg Oral Nightly    hydrocortisone  15 mg Oral QAM    levETIRAcetam  500 mg Oral BID    levothyroxine  50 mcg Oral Daily    lidocaine  1 patch TransDERmal Daily    lidocaine  1 patch TransDERmal Daily    metoprolol succinate  50 mg Oral BID    multivitamin  1 tablet Oral Daily    omega-3 acid ethyl esters  2 g Oral BID    polyethylene glycol  17 g Oral BID    potassium chloride  20 mEq Oral

## 2024-02-21 NOTE — PROGRESS NOTES
[facility]  Family Medicine Attending    Hospital Course: 68 y.o. 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      S: Patient resting in bed on bipap. She recently hd a rectal surgery for prolpase she is also recovering from and reports moderate pain.     O: VS- Blood pressure (!) 136/111, pulse 82, temperature 97.7 °F (36.5 °C), resp. rate 22, height 1.575 m (5' 2.01\"), weight 56.9 kg (125 lb 7.1 oz), SpO2 97 %, not currently breastfeeding.  Exam is as noted by resident with the following changes, additions or corrections:  Gen: NAD AAOx3, bipap  HEENT: Bipap, anicteric eomi  CV RRR ~80  Resp: diminsiedh BS thru-out near baseline exam for me  Abd chronic abd wall laxity. ND NT  Ext: LESLIE= No edema    Impressions:   Principal Problem:    Congestive heart failure of unknown etiology (HCC)  Resolved Problems:    * No resolved hospital problems. *      Plan:   1) Acute on chronic resp failure w/ hypoxia / hypercarbia - Cont BIPAP, steroids, nebs per Pulm.     2) Pulm HTN - sildenafil. Lasix 40 IV BID per Pulm.     3) Eliquis chronic anticoag    4) Rectal prolpase surgery post op - bowel regimen for soft Bms. OK for Low dose tramadol after discussion r/b. Avoid making this a chronic outpatient med if able.        Attending Physician Statement  I have reviewed the chart and seen the patient with the resident(s).  I personally reviewed images, EKG's and similar tests, if present.  I personally reviewed and performed key elements of the history and exam.  I have reviewed and confirmed the Family Medicine admitting team resident history and exam with changes as indicated above.  I agree with the assessment, plan, and orders as documented by the  admitting team resident   Val/Solo/Hayden.  Please refer to the resident progress note today and admission history and physical  for additional information.      Garth Harkins MD

## 2024-02-22 LAB
ANION GAP SERPL CALCULATED.3IONS-SCNC: 11 MMOL/L (ref 7–16)
BASOPHILS # BLD: 0.02 K/UL (ref 0–0.2)
BASOPHILS NFR BLD: 1 % (ref 0–2)
BNP SERPL-MCNC: ABNORMAL PG/ML (ref 0–125)
BUN SERPL-MCNC: 18 MG/DL (ref 6–23)
CALCIUM SERPL-MCNC: 9.1 MG/DL (ref 8.6–10.2)
CHLORIDE SERPL-SCNC: 100 MMOL/L (ref 98–107)
CO2 SERPL-SCNC: 32 MMOL/L (ref 22–29)
CREAT SERPL-MCNC: 1.1 MG/DL (ref 0.5–1)
EOSINOPHIL # BLD: 0.01 K/UL (ref 0.05–0.5)
EOSINOPHILS RELATIVE PERCENT: 0 % (ref 0–6)
ERYTHROCYTE [DISTWIDTH] IN BLOOD BY AUTOMATED COUNT: 18.2 % (ref 11.5–15)
GFR SERPL CREATININE-BSD FRML MDRD: 54 ML/MIN/1.73M2
GLUCOSE SERPL-MCNC: 83 MG/DL (ref 74–99)
HCT VFR BLD AUTO: 31.5 % (ref 34–48)
HGB BLD-MCNC: 9.6 G/DL (ref 11.5–15.5)
IMM GRANULOCYTES # BLD AUTO: <0.03 K/UL (ref 0–0.58)
IMM GRANULOCYTES NFR BLD: 0 % (ref 0–5)
LYMPHOCYTES NFR BLD: 0.62 K/UL (ref 1.5–4)
LYMPHOCYTES RELATIVE PERCENT: 16 % (ref 20–42)
MCH RBC QN AUTO: 26.2 PG (ref 26–35)
MCHC RBC AUTO-ENTMCNC: 30.5 G/DL (ref 32–34.5)
MCV RBC AUTO: 85.8 FL (ref 80–99.9)
MONOCYTES NFR BLD: 0.5 K/UL (ref 0.1–0.95)
MONOCYTES NFR BLD: 13 % (ref 2–12)
NEUTROPHILS NFR BLD: 70 % (ref 43–80)
NEUTS SEG NFR BLD: 2.68 K/UL (ref 1.8–7.3)
PLATELET # BLD AUTO: 164 K/UL (ref 130–450)
PMV BLD AUTO: 10.2 FL (ref 7–12)
POTASSIUM SERPL-SCNC: 3.7 MMOL/L (ref 3.5–5)
RBC # BLD AUTO: 3.67 M/UL (ref 3.5–5.5)
SODIUM SERPL-SCNC: 143 MMOL/L (ref 132–146)
WBC OTHER # BLD: 3.8 K/UL (ref 4.5–11.5)

## 2024-02-22 PROCEDURE — 85025 COMPLETE CBC W/AUTO DIFF WBC: CPT

## 2024-02-22 PROCEDURE — 83880 ASSAY OF NATRIURETIC PEPTIDE: CPT

## 2024-02-22 PROCEDURE — 2700000000 HC OXYGEN THERAPY PER DAY

## 2024-02-22 PROCEDURE — 6360000002 HC RX W HCPCS

## 2024-02-22 PROCEDURE — 94660 CPAP INITIATION&MGMT: CPT

## 2024-02-22 PROCEDURE — 2140000000 HC CCU INTERMEDIATE R&B

## 2024-02-22 PROCEDURE — 6370000000 HC RX 637 (ALT 250 FOR IP)

## 2024-02-22 PROCEDURE — 94640 AIRWAY INHALATION TREATMENT: CPT

## 2024-02-22 PROCEDURE — 80048 BASIC METABOLIC PNL TOTAL CA: CPT

## 2024-02-22 PROCEDURE — 97165 OT EVAL LOW COMPLEX 30 MIN: CPT

## 2024-02-22 PROCEDURE — 99232 SBSQ HOSP IP/OBS MODERATE 35: CPT | Performed by: FAMILY MEDICINE

## 2024-02-22 PROCEDURE — 36415 COLL VENOUS BLD VENIPUNCTURE: CPT

## 2024-02-22 PROCEDURE — 97535 SELF CARE MNGMENT TRAINING: CPT

## 2024-02-22 PROCEDURE — 2580000003 HC RX 258

## 2024-02-22 RX ADMIN — ACETAMINOPHEN 1000 MG: 500 TABLET ORAL at 20:18

## 2024-02-22 RX ADMIN — HYDROCORTISONE 5 MG: 5 TABLET ORAL at 20:19

## 2024-02-22 RX ADMIN — APIXABAN 5 MG: 5 TABLET, FILM COATED ORAL at 20:19

## 2024-02-22 RX ADMIN — ARFORMOTEROL TARTRATE 15 MCG: 15 SOLUTION RESPIRATORY (INHALATION) at 22:07

## 2024-02-22 RX ADMIN — FUROSEMIDE 40 MG: 10 INJECTION, SOLUTION INTRAMUSCULAR; INTRAVENOUS at 18:05

## 2024-02-22 RX ADMIN — IPRATROPIUM BROMIDE AND ALBUTEROL SULFATE 1 DOSE: .5; 2.5 SOLUTION RESPIRATORY (INHALATION) at 11:52

## 2024-02-22 RX ADMIN — METOPROLOL SUCCINATE 50 MG: 50 TABLET, EXTENDED RELEASE ORAL at 20:19

## 2024-02-22 RX ADMIN — DESMOPRESSIN ACETATE 200 MCG: 0.1 TABLET ORAL at 20:18

## 2024-02-22 RX ADMIN — ARFORMOTEROL TARTRATE 15 MCG: 15 SOLUTION RESPIRATORY (INHALATION) at 08:23

## 2024-02-22 RX ADMIN — BUDESONIDE 500 MCG: 0.5 INHALANT RESPIRATORY (INHALATION) at 22:07

## 2024-02-22 RX ADMIN — LEVETIRACETAM 500 MG: 500 TABLET, FILM COATED ORAL at 20:19

## 2024-02-22 RX ADMIN — FERROUS SULFATE TAB 325 MG (65 MG ELEMENTAL FE) 325 MG: 325 (65 FE) TAB at 20:19

## 2024-02-22 RX ADMIN — SILDENAFIL 10 MG: 20 TABLET, FILM COATED ORAL at 20:19

## 2024-02-22 RX ADMIN — IPRATROPIUM BROMIDE AND ALBUTEROL SULFATE 1 DOSE: .5; 2.5 SOLUTION RESPIRATORY (INHALATION) at 15:49

## 2024-02-22 RX ADMIN — BUDESONIDE 500 MCG: 0.5 INHALANT RESPIRATORY (INHALATION) at 08:23

## 2024-02-22 RX ADMIN — OMEGA-3-ACID ETHYL ESTERS 2 G: 1 CAPSULE, LIQUID FILLED ORAL at 20:19

## 2024-02-22 RX ADMIN — IPRATROPIUM BROMIDE AND ALBUTEROL SULFATE 1 DOSE: .5; 2.5 SOLUTION RESPIRATORY (INHALATION) at 08:23

## 2024-02-22 RX ADMIN — SILDENAFIL 10 MG: 20 TABLET, FILM COATED ORAL at 15:29

## 2024-02-22 RX ADMIN — Medication 10 ML: at 20:19

## 2024-02-22 RX ADMIN — ACETAMINOPHEN 1000 MG: 500 TABLET ORAL at 15:29

## 2024-02-22 RX ADMIN — IPRATROPIUM BROMIDE AND ALBUTEROL SULFATE 1 DOSE: .5; 2.5 SOLUTION RESPIRATORY (INHALATION) at 22:07

## 2024-02-22 ASSESSMENT — PAIN DESCRIPTION - DESCRIPTORS: DESCRIPTORS: ACHING

## 2024-02-22 ASSESSMENT — PAIN DESCRIPTION - LOCATION: LOCATION: CHEST

## 2024-02-22 ASSESSMENT — PAIN SCALES - GENERAL: PAINLEVEL_OUTOF10: 8

## 2024-02-22 NOTE — PROGRESS NOTES
On call MD was notified of pt's agitation and removal of medical device, (BiPBP). Nonviolent soft wrist restraint was ordered by MD. Restraints were not applied because pt calmed down after speaking with spouse.

## 2024-02-22 NOTE — CARE COORDINATION
2/22:  Update CM note:  Pt presented to the ER for SOB from home.  Pt is on PAP at 98%, Iv Lasix & Po Eliquis - old medication.   Pt is in [employer] for VRE.  Pt has 02/Cpap  with [employer].  Pt is active with [facility] & [facility] placed.  Pt's dc plan is home & resume [facility].  Will need PT/OT evals once pt is medically stable.  Will need to verify transportation home.  Sw/KAREEN will continue to follow.  Electronically signed by Janna Oakley RN on 2/22/2024 at 3:03 PM

## 2024-02-22 NOTE — PROGRESS NOTES
Comprehensive Nutrition Assessment    Type and Reason for Visit:  Initial, Positive Nutrition Screen    Nutrition Recommendations/Plan:   Continue current diet  Continue Ensure TID to promote oral intake  Will monitor     Malnutrition Assessment:  Malnutrition Status:  Severe malnutrition (02/22/24 1109)    Context:  Chronic Illness     Findings of the 6 clinical characteristics of malnutrition:  Energy Intake:  75% or less estimated energy requirements for 1 month or longer  Weight Loss:  Unable to assess (d/t wt flux likely r/t CHF hx/fluid shifts)     Body Fat Loss:   (moderate) Orbital   Muscle Mass Loss:  Severe muscle mass loss Temples (temporalis), Clavicles (pectoralis & deltoids)  Fluid Accumulation:  No significant fluid accumulation     Strength:  Not Performed    Nutrition Assessment:    pt adm d/t SOB/hypoxia; note recent adm d/t rectal bleeding s/p rectal surgery for prolapse (2/2/24); PMhx of CHF,AFIB,COPD,HTN,GERD,diverticulitis; pt meets criteria for severe malnutrition; continue ensure TID to further promote oral intake ; will monitor.    Nutrition Related Findings:    alert; disoriented to situation; active BS; no edema; I/O WNL; glucose WNL Wound Type: None       Current Nutrition Intake & Therapies:    Average Meal Intake: 51-75% (x1 intake recorded via EMR)  Average Supplements Intake: Unable to assess  ADULT DIET; Regular; Low Sodium (2 gm)  ADULT ORAL NUTRITION SUPPLEMENT; Breakfast, Lunch, Dinner; Standard High Calorie/High Protein Oral Supplement    Anthropometric Measures:  Height: 157.5 cm (5' 2.01\")  Ideal Body Weight (IBW): 110 lbs (50 kg)    Admission Body Weight: 56.5 kg (124 lb 9 oz) (2/22-BS)  Current Body Weight: 56.5 kg (124 lb 9 oz) (2/22-BS), 113.2 % IBW. Weight Source: Bed Scale  Current BMI (kg/m2): 22.8  Usual Body Weight:  (BAYRON d/t wt flux likely r/t CHF hx/fluid shifts)     Weight Adjustment For: No Adjustment                 BMI Categories: Normal Weight (BMI 22.0 to

## 2024-02-22 NOTE — PROGRESS NOTES
[facility] - Family Medicine Inpatient   Resident Progress Note    S:  Hospital day: 2   Brief Synopsis: Rebekah Rodriguez is a 68 y.o. 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 No inciting event, felt short of breath lately and needing BiPAP more at home. Admits to non compliance with nebulizers. In the ED, vitals were reassuring. ABG initiall of pH of 7.4, pCO2 of 45.4 and pO2 >500. ProBNP at 5k, previous admission was at 8k. CXR with chronic processes. She was given a dose of Lasix 20mg IV, DuoNebs x3 and solumedrol 125mg IV. Patient admitted for acute respiratory failure with hypoxia and hypercapnia.    Overnight/interim:   Patient laying comfortably with BiPAP, transitioned to 5-6L O2 to take oral medications  Pain in the chest wall despite tylenol and lidocaine patch. Added one dose of Ultram 50mg. Pain is better.      Cont meds:    sodium chloride       Scheduled meds:    ipratropium 0.5 mg-albuterol 2.5 mg  1 Dose Inhalation Q4H WA RT    furosemide  40 mg IntraVENous BID    sodium chloride flush  5-40 mL IntraVENous 2 times per day    amLODIPine  5 mg Oral Daily    arformoterol tartrate  15 mcg Nebulization BID RT    budesonide  0.5 mg Nebulization BID RT    desmopressin  200 mcg Oral BID    digoxin  62.5 mcg Oral Daily    DULoxetine  60 mg Oral Daily    apixaban  5 mg Oral BID    famotidine  20 mg Oral Daily    ferrous sulfate  325 mg Oral BID    furosemide  20 mg Oral Daily    hydrocortisone  5 mg Oral Nightly    hydrocortisone  15 mg Oral QAM    levETIRAcetam  500 mg Oral BID    levothyroxine  50 mcg Oral Daily    lidocaine  1 patch TransDERmal Daily    lidocaine  1 patch TransDERmal Daily    metoprolol succinate  50 mg Oral BID    multivitamin  1 tablet Oral Daily    omega-3 acid ethyl esters  2 g Oral BID    polyethylene glycol  17 g Oral BID    potassium  Revatio  pulmonary sarcoidosis stage 4  CHF exacerbation  Oxygen therapy, titrate as tolerated to baseline of 5-6 L  NIV in the form of BiPap with home settings of 14/6 with BUR 16 at HS and PRN  Scheduled bronchodilators-brovana and pulmicort BID with DuoNebs q4h.  Albuterol nebs prn  Increase diuresis - Lasix 40 mg IV BID.  Monitor for contraction alkalosis.  Monitor I&Os  Solu Cortef 15 mg every morning and 5 mg even evening - home dosing.  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 and 5 mg p.m.     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 Lizama

## 2024-02-22 NOTE — PROGRESS NOTES
Cecilio Donovan M.D.,[facility]  Shaheed Agudelo D.O., F.YVONNE., [facility]  Evans Agarwal M.D.  Abby Black M.D.   PEDRITO SotoO.        Daily Pulmonary Progress Note    Patient:  Rebekah Rodriguez 68 y.o. female MRN: [id number]     Date of Service: 2/22/2024      Synopsis     We are following patient for COPD, pHTN    \"CC\" shortness of breath    Code status: FULL CODE      Subjective      Patient was seen and examined resting in bed on BiPap.  She is breathing comfortably.  Sitter is at the bedside stating patient sometimes takes the BiPap off.  I told her the patient can come off and placed on 5 L and she typically lets you know when she feels the need to go back on the BiPap.      Review of Systems:  Constitutional: Denies fever, weight loss, night sweats, and fatigue  Skin: Denies pigmentation, dark lesions, and rashes   HEENT: Denies hearing loss, tinnitus, ear drainage, epistaxis, sore throat, and hoarseness.  Cardiovascular: Denies palpitations, chest pain, and chest pressure.  Respiratory: Denies cough, dyspnea at rest, hemoptysis, apnea, and choking.  Gastrointestinal: Denies nausea, vomiting, poor appetite, diarrhea, heartburn or reflux  Genitourinary: Denies dysuria, frequency, urgency or hematuria  Musculoskeletal: Denies myalgias, muscle weakness, and bone pain  Neurological: Denies dizziness, vertigo, headache, and focal weakness  Psychological: Denies anxiety and depression  Endocrine: Denies heat intolerance and cold intolerance  Hematopoietic/Lymphatic: Denies bleeding problems and blood transfusions    24-hour events:  No new events    Objective   OBJECTIVE:   BP (!) 160/98   Pulse 60   Temp 98 °F (36.7 °C)   Resp 18   Ht 1.575 m (5' 2.01\")   Wt 56.5 kg (124 lb 8 oz)   LMP  (LMP Unknown)   SpO2 98%   BMI 22.77 kg/m²   SpO2 Readings from Last 1 Encounters:   02/22/24 98%        I/O:    Intake/Output Summary (Last 24 hours) at 2/22/2024 1313  Last data filed at 2/22/2024

## 2024-02-22 NOTE — PLAN OF CARE
Problem: Chronic Conditions and Co-morbidities  Goal: Patient's chronic conditions and co-morbidity symptoms are monitored and maintained or improved  Outcome: Progressing     Problem: Discharge Planning  Goal: Discharge to home or other facility with appropriate resources  Outcome: Progressing  Flowsheets (Taken 2/21/2024 1501 by Zari Cerda, RN)  Discharge to home or other facility with appropriate resources: Identify barriers to discharge with patient and caregiver     Problem: Safety - Adult  Goal: Free from fall injury  Outcome: Progressing     Problem: Skin/Tissue Integrity  Goal: Absence of new skin breakdown  Description: 1.  Monitor for areas of redness and/or skin breakdown  2.  Assess vascular access sites hourly  3.  Every 4-6 hours minimum:  Change oxygen saturation probe site  4.  Every 4-6 hours:  If on nasal continuous positive airway pressure, respiratory therapy assess nares and determine need for appliance change or resting period.  Outcome: Progressing     Problem: ABCDS Injury Assessment  Goal: Absence of physical injury  Outcome: Progressing     Problem: Pain  Goal: Verbalizes/displays adequate comfort level or baseline comfort level  Outcome: Progressing

## 2024-02-22 NOTE — PROGRESS NOTES
[facility]  Family Medicine Attending    Hospital Course: 68 y.o. 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      S: Patient resting in bed on BPAP; she is very tired this morning after having been up and combative at times overnight.      O: VS- Blood pressure (!) 160/98, pulse 60, temperature 98 °F (36.7 °C), resp. rate 18, height 1.575 m (5' 2.01\"), weight 56.5 kg (124 lb 8 oz), SpO2 98 %, not currently breastfeeding.  Exam is as noted by resident with the following changes, additions or corrections:  Gen: NAD, sleeping but responsive, BPAP  HEENT: BPAP  CV RRR    Resp: diminished BS, near baseline      Impressions:   Principal Problem:    Congestive heart failure of unknown etiology (HCC)  Active Problems:    Severe protein-calorie malnutrition (HCC)  Resolved Problems:    * No resolved hospital problems. *      Plan:   1) Acute on chronic resp failure w/ hypoxia / hypercarbia - Cont BIPAP, steroids, nebs per Pulm.     2) Pulm HTN - sildenafil. Lasix 40 IV BID per Pulm.     3) Eliquis chronic anticoag    4) Rectal prolpase surgery post op - bowel regimen for soft Bms. OK for Low dose tramadol after discussion r/b, received x 1.         Attending Physician Statement  I have reviewed the chart and seen the patient with the resident(s).  I personally reviewed images, EKG's and similar tests, if present.  I personally reviewed and performed key elements of the history and exam.  I have reviewed and confirmed the Family Medicine admitting team resident history and exam with changes as indicated above.  I agree with the assessment, plan, and orders as documented by the  admitting team resident Dr Neal/Solo/Hayden.  Please refer to the resident progress note today and admission

## 2024-02-22 NOTE — PROGRESS NOTES
Pt woke up requesting to get out of bed to use the bathroom. Upon informing her that she is on BiPAP and a bedpan should be used, she got agitated and combative and insisted to get out of bed. She took the BiPAP mask off  and refused to wear NC when offered to help with her tachypnea and SOB. She was reoriented and redirected without any success. Pt called her  who encouraged her to listen to her care team to help improve her oxygenation. She calmed down and placed the BiPAP mask back on.   Sitter is at bedside. Will continue with plan of care.

## 2024-02-22 NOTE — PROGRESS NOTES
Notified by aid that pt O2 sat was reading 85% while obtaining vital signs. While assessing pt and placing a continuous pulse ox  at bedside agitated and upset wanting her BiPap to be placed immediately.  Attempted to explain need for assessment and determine O2 sat prior to bipap placement.  Continuous pulse ox was placed reading 94% on 5L NC.  RR 22.  Pt states she feels short of breath. BiPap placed for comfort. O2 sat 100% on bipap. No other needs at this time.

## 2024-02-22 NOTE — PROGRESS NOTES
Occupational Therapy  OCCUPATIONAL THERAPY INITIAL EVALUATION    [facility]  [address], [place]         Date:2024                                                  Patient Name: Rebekah Rodriguez    MRN: [id number]    : 1955    Room: [address] [place], [place] [address]A      Evaluating OT: China Vargas OTR/L [id number]       Referring Provider:Debra Neal Chi, MD      Specific Provider Orders/Date: OT eval and treat 24      Diagnosis: CHF      Surgery: none      Pertinent Medical History: A-Fib, CHF,CKD,COPD,Compression Fracture,CKD         Precautions:  Fall Risk, O2, Contact      Assessment of current deficits    [x] Functional mobility  [x]ADLs  [x] Strength               []Cognition    [x] Functional transfers   [x] IADLs         [x] Safety Awareness   [x]Endurance    [x] Fine Coordination              [x] Balance      [] Vision/perception   []Sensation     [x]Gross Motor Coordination  [] ROM  [] Delirium                   [] Motor Control     OT PLAN OF CARE   OT POC based on physician orders, patient diagnosis and results of clinical assessment    Frequency/Duration 1-3 days/wk for 2 weeks PRN   Specific OT Treatment Interventions to include:   * Instruction/training on adapted ADL techniques and AE recommendations to increase functional independence within precautions       * Training on energy conservation strategies, correct breathing pattern and techniques to improve independence/tolerance for self-care routine  * Functional transfer/mobility training/DME recommendations for increased independence, safety, and fall prevention  * Patient/Family education to increase follow through with safety techniques and functional independence  * Recommendation of environmental modifications for increased safety with functional transfers/mobility and ADLs  * Therapeutic exercise to improve motor endurance, ROM, and functional strength for ADLs/functional

## 2024-02-23 ENCOUNTER — APPOINTMENT (OUTPATIENT)
Dept: GENERAL RADIOLOGY | Age: 69
End: 2024-02-23
Payer: MEDICARE

## 2024-02-23 VITALS
WEIGHT: 124.5 LBS | OXYGEN SATURATION: 98 % | HEART RATE: 81 BPM | RESPIRATION RATE: 18 BRPM | HEIGHT: 62 IN | TEMPERATURE: 97.7 F | BODY MASS INDEX: 22.91 KG/M2 | DIASTOLIC BLOOD PRESSURE: 61 MMHG | SYSTOLIC BLOOD PRESSURE: 102 MMHG

## 2024-02-23 LAB
ANION GAP SERPL CALCULATED.3IONS-SCNC: 11 MMOL/L (ref 7–16)
BASOPHILS # BLD: 0.02 K/UL (ref 0–0.2)
BASOPHILS NFR BLD: 0 % (ref 0–2)
BUN SERPL-MCNC: 26 MG/DL (ref 6–23)
CALCIUM SERPL-MCNC: 8.7 MG/DL (ref 8.6–10.2)
CHLORIDE SERPL-SCNC: 101 MMOL/L (ref 98–107)
CO2 SERPL-SCNC: 34 MMOL/L (ref 22–29)
CREAT SERPL-MCNC: 1 MG/DL (ref 0.5–1)
EOSINOPHIL # BLD: 0.16 K/UL (ref 0.05–0.5)
EOSINOPHILS RELATIVE PERCENT: 3 % (ref 0–6)
ERYTHROCYTE [DISTWIDTH] IN BLOOD BY AUTOMATED COUNT: 18 % (ref 11.5–15)
GFR SERPL CREATININE-BSD FRML MDRD: 59 ML/MIN/1.73M2
GLUCOSE SERPL-MCNC: 82 MG/DL (ref 74–99)
HCT VFR BLD AUTO: 31.7 % (ref 34–48)
HGB BLD-MCNC: 9.5 G/DL (ref 11.5–15.5)
IMM GRANULOCYTES # BLD AUTO: 0.04 K/UL (ref 0–0.58)
IMM GRANULOCYTES NFR BLD: 1 % (ref 0–5)
LYMPHOCYTES NFR BLD: 0.58 K/UL (ref 1.5–4)
LYMPHOCYTES RELATIVE PERCENT: 9 % (ref 20–42)
MAGNESIUM SERPL-MCNC: 1.7 MG/DL (ref 1.6–2.6)
MCH RBC QN AUTO: 25.9 PG (ref 26–35)
MCHC RBC AUTO-ENTMCNC: 30 G/DL (ref 32–34.5)
MCV RBC AUTO: 86.4 FL (ref 80–99.9)
MONOCYTES NFR BLD: 0.53 K/UL (ref 0.1–0.95)
MONOCYTES NFR BLD: 9 % (ref 2–12)
NEUTROPHILS NFR BLD: 79 % (ref 43–80)
NEUTS SEG NFR BLD: 4.84 K/UL (ref 1.8–7.3)
PLATELET # BLD AUTO: 173 K/UL (ref 130–450)
PMV BLD AUTO: 11.5 FL (ref 7–12)
POTASSIUM SERPL-SCNC: 2.9 MMOL/L (ref 3.5–5)
POTASSIUM SERPL-SCNC: 4 MMOL/L (ref 3.5–5)
RBC # BLD AUTO: 3.67 M/UL (ref 3.5–5.5)
RBC # BLD: ABNORMAL 10*6/UL
SODIUM SERPL-SCNC: 146 MMOL/L (ref 132–146)
WBC OTHER # BLD: 6.2 K/UL (ref 4.5–11.5)

## 2024-02-23 PROCEDURE — 6360000002 HC RX W HCPCS

## 2024-02-23 PROCEDURE — 99232 SBSQ HOSP IP/OBS MODERATE 35: CPT | Performed by: FAMILY MEDICINE

## 2024-02-23 PROCEDURE — 6370000000 HC RX 637 (ALT 250 FOR IP)

## 2024-02-23 PROCEDURE — 80048 BASIC METABOLIC PNL TOTAL CA: CPT

## 2024-02-23 PROCEDURE — 84132 ASSAY OF SERUM POTASSIUM: CPT

## 2024-02-23 PROCEDURE — 94640 AIRWAY INHALATION TREATMENT: CPT

## 2024-02-23 PROCEDURE — 36415 COLL VENOUS BLD VENIPUNCTURE: CPT

## 2024-02-23 PROCEDURE — 83735 ASSAY OF MAGNESIUM: CPT

## 2024-02-23 PROCEDURE — 85025 COMPLETE CBC W/AUTO DIFF WBC: CPT

## 2024-02-23 PROCEDURE — 94660 CPAP INITIATION&MGMT: CPT

## 2024-02-23 PROCEDURE — 71045 X-RAY EXAM CHEST 1 VIEW: CPT

## 2024-02-23 RX ORDER — POTASSIUM CHLORIDE 20 MEQ/1
40 TABLET, EXTENDED RELEASE ORAL ONCE
Status: COMPLETED | OUTPATIENT
Start: 2024-02-23 | End: 2024-02-23

## 2024-02-23 RX ORDER — MAGNESIUM SULFATE 1 G/100ML
1000 INJECTION INTRAVENOUS ONCE
Status: COMPLETED | OUTPATIENT
Start: 2024-02-23 | End: 2024-02-23

## 2024-02-23 RX ADMIN — AMLODIPINE BESYLATE 5 MG: 5 TABLET ORAL at 09:24

## 2024-02-23 RX ADMIN — FAMOTIDINE 20 MG: 20 TABLET, FILM COATED ORAL at 09:24

## 2024-02-23 RX ADMIN — SILDENAFIL 10 MG: 20 TABLET, FILM COATED ORAL at 14:48

## 2024-02-23 RX ADMIN — POTASSIUM CHLORIDE 20 MEQ: 1500 TABLET, EXTENDED RELEASE ORAL at 09:24

## 2024-02-23 RX ADMIN — POTASSIUM CHLORIDE 40 MEQ: 1500 TABLET, EXTENDED RELEASE ORAL at 12:24

## 2024-02-23 RX ADMIN — DULOXETINE HYDROCHLORIDE 60 MG: 60 CAPSULE, DELAYED RELEASE ORAL at 09:24

## 2024-02-23 RX ADMIN — DIGOXIN 62.5 MCG: 125 TABLET ORAL at 09:24

## 2024-02-23 RX ADMIN — MULTIVITAMIN TABLET 1 TABLET: TABLET at 09:26

## 2024-02-23 RX ADMIN — APIXABAN 5 MG: 5 TABLET, FILM COATED ORAL at 09:24

## 2024-02-23 RX ADMIN — POTASSIUM CHLORIDE 10 MEQ: 7.46 INJECTION, SOLUTION INTRAVENOUS at 12:53

## 2024-02-23 RX ADMIN — LEVOTHYROXINE SODIUM 50 MCG: 0.05 TABLET ORAL at 09:24

## 2024-02-23 RX ADMIN — DESMOPRESSIN ACETATE 200 MCG: 0.1 TABLET ORAL at 09:25

## 2024-02-23 RX ADMIN — ARFORMOTEROL TARTRATE 15 MCG: 15 SOLUTION RESPIRATORY (INHALATION) at 08:04

## 2024-02-23 RX ADMIN — SILDENAFIL 10 MG: 20 TABLET, FILM COATED ORAL at 09:26

## 2024-02-23 RX ADMIN — Medication 1000 UNITS: at 09:24

## 2024-02-23 RX ADMIN — FERROUS SULFATE TAB 325 MG (65 MG ELEMENTAL FE) 325 MG: 325 (65 FE) TAB at 09:25

## 2024-02-23 RX ADMIN — LEVETIRACETAM 500 MG: 500 TABLET, FILM COATED ORAL at 09:24

## 2024-02-23 RX ADMIN — POTASSIUM CHLORIDE 10 MEQ: 7.46 INJECTION, SOLUTION INTRAVENOUS at 07:11

## 2024-02-23 RX ADMIN — ACETAMINOPHEN 1000 MG: 500 TABLET ORAL at 14:48

## 2024-02-23 RX ADMIN — HYDROCORTISONE 15 MG: 5 TABLET ORAL at 09:26

## 2024-02-23 RX ADMIN — METOPROLOL SUCCINATE 50 MG: 50 TABLET, EXTENDED RELEASE ORAL at 09:24

## 2024-02-23 RX ADMIN — OMEGA-3-ACID ETHYL ESTERS 2 G: 1 CAPSULE, LIQUID FILLED ORAL at 09:25

## 2024-02-23 RX ADMIN — MAGNESIUM SULFATE HEPTAHYDRATE 1000 MG: 1 INJECTION, SOLUTION INTRAVENOUS at 10:30

## 2024-02-23 RX ADMIN — IPRATROPIUM BROMIDE AND ALBUTEROL SULFATE 1 DOSE: .5; 2.5 SOLUTION RESPIRATORY (INHALATION) at 08:04

## 2024-02-23 RX ADMIN — FUROSEMIDE 40 MG: 10 INJECTION, SOLUTION INTRAMUSCULAR; INTRAVENOUS at 09:27

## 2024-02-23 RX ADMIN — POTASSIUM CHLORIDE 10 MEQ: 7.46 INJECTION, SOLUTION INTRAVENOUS at 11:27

## 2024-02-23 RX ADMIN — POTASSIUM CHLORIDE 10 MEQ: 7.46 INJECTION, SOLUTION INTRAVENOUS at 10:27

## 2024-02-23 RX ADMIN — ACETAMINOPHEN 1000 MG: 500 TABLET ORAL at 06:09

## 2024-02-23 RX ADMIN — POTASSIUM CHLORIDE 10 MEQ: 7.46 INJECTION, SOLUTION INTRAVENOUS at 09:38

## 2024-02-23 RX ADMIN — BUDESONIDE 500 MCG: 0.5 INHALANT RESPIRATORY (INHALATION) at 08:04

## 2024-02-23 RX ADMIN — IPRATROPIUM BROMIDE AND ALBUTEROL SULFATE 1 DOSE: .5; 2.5 SOLUTION RESPIRATORY (INHALATION) at 15:29

## 2024-02-23 ASSESSMENT — PAIN SCALES - GENERAL
PAINLEVEL_OUTOF10: 0
PAINLEVEL_OUTOF10: 0

## 2024-02-23 NOTE — PROGRESS NOTES
Patient cleaned up, patient had x1 samll loosely formed blackish brown BM. Patient cleaned and covered with blankets for comfort.

## 2024-02-23 NOTE — PROGRESS NOTES
[facility]  Family Medicine Attending    Hospital Course: 68 y.o. 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      S: Patient resting in bed on BPAP; briefly awakens to cooperate w exam but wants to rest. She reports no complaints outside of wanting to sleep.     O: VS- Blood pressure (!) 141/101, pulse 75, temperature 97.9 °F (36.6 °C), temperature source Oral, resp. rate 18, height 1.575 m (5' 2.01\"), weight 56.5 kg (124 lb 8 oz), SpO2 96 %, not currently breastfeeding.  Exam is as noted by resident with the following changes, additions or corrections:  Gen: NAD, sleeping but responsive, BPAP  HEENT: BPAP  CV RRR    Resp: diminished BS, near baseline      Impressions:   Principal Problem:    Congestive heart failure of unknown etiology (HCC)  Active Problems:    Severe protein-calorie malnutrition (HCC)  Resolved Problems:    * No resolved hospital problems. *      Plan:   1) Acute on chronic resp failure w/ hypoxia / hypercarbia - Cont BIPAP, steroids, nebs per Pulm.     2) Pulm HTN - sildenafil. Lasix 40 IV BID per Pulm. (Held PO Lasix dose while on IV)     3) Hx VTE - Eliquis chronic anticoag    4) Rectal prolpase surgery post op - bowel regimen for soft Bms. OK for Low dose tramadol after discussion r/b, received x 1.          See resident note for further details.        Attending Physician Statement  I have reviewed the chart and seen the patient with the resident(s).  I personally reviewed images, EKG's and similar tests, if present.  I personally reviewed and performed key elements of the history and exam.  I have reviewed and confirmed the Family Medicine admitting team resident history and exam with changes as indicated above.  I agree with the assessment, plan, and  orders as documented by the FM admitting team resident Dr Neal/Solo/Hayden.  Please refer to the resident progress note today and admission history and physical  for additional information.      Garth Harkins MD

## 2024-02-23 NOTE — DISCHARGE INSTRUCTIONS
DISCHARGE INSTRUCTIONS - FAMILY MEDICINE    Follow up at the Critical access hospital ([facility]). If there are any issues and cannot present to your appointment, please contact us at [phone] and we will schedule a new appointment for you.     Future Appointments   Date Time Provider Department Center   2/27/2024 10:00 AM Carlos Bond MD [facility]   3/6/2024  2:15 PM Carlos A Bae MD BD GEN SURG [facility]   3/12/2024  2:40 PM Joya Garcia MD [place]   4/18/2024 11:15 AM Edu Olivas MD [facility] ENDO [facility]:  [address] [place] [address]         Phone: [phone]    Once discharged from [facility], you can :    Return to work : Yes, you may return to work  Activity : As tolerated  Stairs : As tolerated  Exercise : As tolerated  Lifting : As tolerated   Sexual activity : Yes  Driving : With seat belt on. NO driving on narcotic pain medication if prescribed   Medications : Always take your medications as prescribed  Diet : You are asked to make an attempt to improve diet and exercise patterns to aid in medical management of your medical condition/problem.    Call [facility] with any further questions. Return to Emergency Department with any worsening of your condition and/or fever greater than 101 degrees, new weakness, shortness of breath or chest pain.  ______________________________________________________________________    =====================================   [facility]   DISCHARGE INSTRUCTIONS   =====================================   Take your medications as directed in this summary     Follow up with all your future appointments as advised   Call [phone] to confirm your appointment with [facility] ([facility]) as soon as possible and/or if there are any appointment changes or other issues.     It is important that you follow up with us for better monitoring of the current cause of your

## 2024-02-23 NOTE — PROGRESS NOTES
[facility] - Family Medicine Inpatient   Resident Progress Note    S:  Hospital day: 3   Brief Synopsis: Rebekah Rodriguez is a 68 y.o. 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 No inciting event, felt short of breath lately and needing BiPAP more at home. Admits to non compliance with nebulizers. In the ED, vitals were reassuring. ABG initiall of pH of 7.4, pCO2 of 45.4 and pO2 >500. ProBNP at 5k, previous admission was at 8k. CXR with chronic processes. She was given a dose of Lasix 20mg IV, DuoNebs x3 and solumedrol 125mg IV. Patient admitted for acute respiratory failure with hypoxia and hypercapnia.    Overnight/interim:   Patient laying comfortably with BiPAP, transitioned to 5-6L O2 to take oral medications  Pain in the chest wall despite tylenol and lidocaine patch.    Cont meds:    sodium chloride       Scheduled meds:    potassium chloride  40 mEq Oral Once    magnesium sulfate  1,000 mg IntraVENous Once    ipratropium 0.5 mg-albuterol 2.5 mg  1 Dose Inhalation Q4H WA RT    furosemide  40 mg IntraVENous BID    sodium chloride flush  5-40 mL IntraVENous 2 times per day    amLODIPine  5 mg Oral Daily    arformoterol tartrate  15 mcg Nebulization BID RT    budesonide  0.5 mg Nebulization BID RT    desmopressin  200 mcg Oral BID    digoxin  62.5 mcg Oral Daily    DULoxetine  60 mg Oral Daily    apixaban  5 mg Oral BID    famotidine  20 mg Oral Daily    ferrous sulfate  325 mg Oral BID    furosemide  20 mg Oral Daily    hydrocortisone  5 mg Oral Nightly    hydrocortisone  15 mg Oral QAM    levETIRAcetam  500 mg Oral BID    levothyroxine  50 mcg Oral Daily    lidocaine  1 patch TransDERmal Daily    lidocaine  1 patch TransDERmal Daily    metoprolol succinate  50 mg Oral BID    multivitamin  1 tablet Oral Daily    omega-3 acid ethyl esters  2 g Oral BID     severe pulmonary HTN on Revatio  pulmonary sarcoidosis stage 4  CHF exacerbation  Oxygen therapy, titrate as tolerated to baseline of 5-6 L  NIV in the form of BiPap with home settings of 14/6 with BUR 16 at HS and PRN  Scheduled bronchodilators-brovana and pulmicort BID with DuoNebs q4h.  Albuterol nebs prn  Continue diuresis - Lasix 40 mg IV BID.  Monitor for contraction alkalosis.  Monitor I&Os. Monitor ProBNP.  Solu Cortef 15 mg every morning and 5 mg even evening - home dosing.  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 and 5 mg p.m.     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 Harkins

## 2024-02-23 NOTE — PROGRESS NOTES
Dr. Roy at bedside and states patient is ok to discharge from a pulmonology standpoint.   Christa Hill RN

## 2024-02-23 NOTE — PLAN OF CARE
Problem: Chronic Conditions and Co-morbidities  Goal: Patient's chronic conditions and co-morbidity symptoms are monitored and maintained or improved  Outcome: Progressing     Problem: Discharge Planning  Goal: Discharge to home or other facility with appropriate resources  Outcome: Progressing     Problem: Safety - Adult  Goal: Free from fall injury  Outcome: Progressing  Flowsheets (Taken 2/23/2024 1051)  Free From Fall Injury:   Instruct family/caregiver on patient safety   Based on caregiver fall risk screen, instruct family/caregiver to ask for assistance with transferring infant if caregiver noted to have fall risk factors     Problem: Skin/Tissue Integrity  Goal: Absence of new skin breakdown  Description: 1.  Monitor for areas of redness and/or skin breakdown  2.  Assess vascular access sites hourly  3.  Every 4-6 hours minimum:  Change oxygen saturation probe site  4.  Every 4-6 hours:  If on nasal continuous positive airway pressure, respiratory therapy assess nares and determine need for appliance change or resting period.  Outcome: Progressing     Problem: ABCDS Injury Assessment  Goal: Absence of physical injury  Outcome: Progressing  Flowsheets (Taken 2/23/2024 1051)  Absence of Physical Injury: Implement safety measures based on patient assessment     Problem: Pain  Goal: Verbalizes/displays adequate comfort level or baseline comfort level  Outcome: Progressing     Problem: Pain  Goal: Verbalizes/displays adequate comfort level or baseline comfort level  Outcome: Progressing     Problem: Safety - Medical Restraint  Goal: Remains free of injury from restraints (Restraint for Interference with Medical Device)  Description: INTERVENTIONS:  1. Determine that other, less restrictive measures have been tried or would not be effective before applying the restraint  2. Evaluate the patient's condition at the time of restraint application  3. Inform patient/family regarding the reason for restraint  4. Q2H:

## 2024-02-23 NOTE — PROGRESS NOTES
Physical Therapy    Date: 2024       Patient Name: Rebekah Rodriguez  : 1955      MRN: [id number]    Physical therapy order received and chart reviewed. PT evaluation attempted this AM. Pt was soundly sleeping on BiPAP.   Will follow up as appropriate.     Nikia Conklin PT, DPT  [id number]

## 2024-02-23 NOTE — CARE COORDINATION
2/23:  Update CM note:  Pt presented to the ER for SOB from home.  Pt is on PAP/5L/NC at 96%, Iv Lasix & Po Eliquis - old medication.   Pt is in [employer] for VRE.  Pt has 02/Cpap with [employer] - per Wolfgang at 6L/NC at home - concentrator at 10L. Pt is active with [facility] [facility] placed.  Cm spoke with pt bedside & her dc plan is home with [facility].  HEr  is always there as her 24hrs care giver.  Kareen spoke with her  who will transport the pt home once medically cleared.  Sw/KAREEN will continue to follow.  Electronically signed by Janna Oakley RN on 2/23/2024 at 2:08 PM

## 2024-02-23 NOTE — PROGRESS NOTES
This RN believe patient meets criteria to pull sitter at this time. Patient resting quietly at this time and has been calm and cooperative for dayshift as well as nightshift over past day. Denies further needs or complaints.

## 2024-02-23 NOTE — PROGRESS NOTES
Cecilio Donovan M.D.,[facility]  Shaheed Agudelo D.O., F.GUANAKO.URIEL.OCHANEL., [facility]  Evans Agarwal M.D.  Abby Black M.D.   SARIKA Soto.O.        Daily Pulmonary Progress Note    Patient:  Rebekah Rodriguez 68 y.o. female MRN: [id number]     Date of Service: 2/23/2024      Synopsis     We are following patient for COPD, pHTN    \"CC\" shortness of breath    Code status: FULL CODE      Subjective      Patient was seen and examined resting in bed on BiPap.  She is alert and oriented.  She is asking when she is able to go home.  Potassium level low today at 2.9 requiring IV supplementation.      Review of Systems:  Constitutional: Denies fever, weight loss, night sweats, and fatigue  Skin: Denies pigmentation, dark lesions, and rashes   HEENT: Denies hearing loss, tinnitus, ear drainage, epistaxis, sore throat, and hoarseness.  Cardiovascular: Denies palpitations, chest pain, and chest pressure.  Respiratory: Denies cough, dyspnea at rest, hemoptysis, apnea, and choking.  Gastrointestinal: Denies nausea, vomiting, poor appetite, diarrhea, heartburn or reflux  Genitourinary: Denies dysuria, frequency, urgency or hematuria  Musculoskeletal: Denies myalgias, muscle weakness, and bone pain  Neurological: Denies dizziness, vertigo, headache, and focal weakness  Psychological: Denies anxiety and depression  Endocrine: Denies heat intolerance and cold intolerance  Hematopoietic/Lymphatic: Denies bleeding problems and blood transfusions    24-hour events:  No new events    Objective   OBJECTIVE:   BP (!) 141/101   Pulse 75   Temp 97.9 °F (36.6 °C) (Oral)   Resp 18   Ht 1.575 m (5' 2.01\")   Wt 56.5 kg (124 lb 8 oz)   LMP  (LMP Unknown)   SpO2 96%   BMI 22.77 kg/m²   SpO2 Readings from Last 1 Encounters:   02/23/24 96%        I/O:    Intake/Output Summary (Last 24 hours) at 2/23/2024 1410  Last data filed at 2/23/2024 1210  Gross per 24 hour   Intake 475 ml   Output 2000 ml   Net -1525 ml               IPAP: 14  cmH20  CPAP/EPAP: 6 cmH2O     CURRENT MEDS :  Scheduled Meds:   ipratropium 0.5 mg-albuterol 2.5 mg  1 Dose Inhalation Q4H WA RT    furosemide  40 mg IntraVENous BID    sodium chloride flush  5-40 mL IntraVENous 2 times per day    amLODIPine  5 mg Oral Daily    arformoterol tartrate  15 mcg Nebulization BID RT    budesonide  0.5 mg Nebulization BID RT    desmopressin  200 mcg Oral BID    digoxin  62.5 mcg Oral Daily    DULoxetine  60 mg Oral Daily    apixaban  5 mg Oral BID    famotidine  20 mg Oral Daily    ferrous sulfate  325 mg Oral BID    [Held by provider] furosemide  20 mg Oral Daily    hydrocortisone  5 mg Oral Nightly    hydrocortisone  15 mg Oral QAM    levETIRAcetam  500 mg Oral BID    levothyroxine  50 mcg Oral Daily    lidocaine  1 patch TransDERmal Daily    lidocaine  1 patch TransDERmal Daily    metoprolol succinate  50 mg Oral BID    multivitamin  1 tablet Oral Daily    omega-3 acid ethyl esters  2 g Oral BID    polyethylene glycol  17 g Oral BID    potassium chloride  20 mEq Oral Daily    sildenafil  10 mg Oral TID    Vitamin D  1,000 Units Oral Daily    acetaminophen  1,000 mg Oral 3 times per day       Physical Exam:  General Appearance: appears comfortable in no acute distress.   HEENT: Normocephalic atraumatic without obvious abnormality   Neck: Lips, mucosa, and tongue normal.  Supple, symmetrical, trachea midline, no adenopathy;thyroid:  no enlargement/tenderness/nodules or JVD.  Lung: Breath sounds CTA. Respirations   unlabored. Symmetrical expansion.  Heart: RRR, normal S1, S2. No MRG  Abdomen: Soft, NT, ND. BS present x 4 quadrants. No bruit or organomegaly.   Extremities: Pedal pulses 2+ symmetric b/l.  Extremities normal, no cyanosis, clubbing, or edema.   Musculokeletal: No joint swelling, no muscle tenderness. ROM normal in all joints of extremities.   Neurologic: Mental status: Alert and Oriented X3 .    Pertinent/ New Labs and Imaging Studies     Imaging personally reviewed:  CXR

## 2024-02-24 NOTE — DISCHARGE SUMMARY
for Wheezing or Shortness of Breath     amLODIPine 5 MG tablet  Commonly known as: NORVASC  Take 1 tablet by mouth daily     Brovana 15 MCG/2ML Nebu  Generic drug: arformoterol tartrate     budesonide 0.5 MG/2ML nebulizer suspension  Commonly known as: PULMICORT     desmopressin 0.2 MG tablet  Commonly known as: DDAVP     digoxin 125 MCG tablet  Commonly known as: LANOXIN     DULoxetine 60 MG extended release capsule  Commonly known as: Cymbalta  Take 1 capsule by mouth daily     Eliquis 5 MG Tabs tablet  Generic drug: apixaban  Take 1 tablet by mouth 2 times daily     famotidine 20 MG tablet  Commonly known as: PEPCID  Take 1 tablet by mouth daily     ferrous sulfate 325 (65 Fe) MG tablet  Commonly known as: IRON 325     furosemide 20 MG tablet  Commonly known as: LASIX  Take 1 tablet by mouth daily     guaiFENesin 400 MG tablet     * hydrocortisone 5 MG tablet  Commonly known as: Cortef  Take 1 tablet by mouth at bedtime     * hydrocortisone 5 MG tablet  Commonly known as: Cortef  Take 3 tablets by mouth every morning     levETIRAcetam 500 MG tablet  Commonly known as: KEPPRA  Take 1 tablet by mouth 2 times daily     levothyroxine 50 MCG tablet  Commonly known as: SYNTHROID  Take 1 tablet by mouth daily     lidocaine 5 %  Commonly known as: LIDODERM  Place 1 patch onto the skin daily 12 hours on, 12 hours off.     metoprolol succinate 50 MG extended release tablet  Commonly known as: TOPROL XL  Take 1 tablet by mouth 2 times daily     Multi-Day Vitamins Tabs  Take 1 tablet by mouth daily     omega-3 acid ethyl esters 1 g capsule  Commonly known as: LOVAZA  Take 2 capsules by mouth 2 times daily     OXYGEN     polyethylene glycol 17 g packet  Commonly known as: GLYCOLAX     potassium chloride 20 MEQ extended release tablet  Commonly known as: KLOR-CON M  Take 1 tablet by mouth daily     sildenafil 20 MG tablet  Commonly known as: REVATIO  Take 0.5 tablets by mouth 3 times daily     tiZANidine 2 MG tablet  Commonly  choking.  Gastrointestinal: Denies nausea, vomiting, poor appetite, diarrhea, heartburn or reflux  Genitourinary: Denies dysuria, frequency, urgency or hematuria  Musculoskeletal: Denies myalgias, muscle weakness, and bone pain  Neurological: Denies dizziness, vertigo, headache, and focal weakness  Psychological: Denies anxiety and depression  Endocrine: Denies heat intolerance and cold intolerance  Hematopoietic/Lymphatic: Denies bleeding problems and blood transfusions    [facility]    Future Appointments   Date Time Provider Department Center   2/27/2024 10:00 AM Carlos Bond MD [facility] [place] [facility]   3/6/2024  2:15 PM Carlos A Bae MD BD GEN SURG [facility]   3/12/2024  2:40 PM Joya Garcia MD [place] [facility]   4/18/2024 11:15 AM Edu Olivas MD [facility] ENDO [facility]       More than 30 minutes was spent in preparation of this patient's discharge including, but not limited to, examination, preparation of documents, prescription preparation, counseling and coordination.    Signed:  Geovanny Banda MD  2/24/2024, 12:59 PM

## 2024-02-26 ENCOUNTER — TELEPHONE (OUTPATIENT)
Dept: FAMILY MEDICINE CLINIC | Age: 69
End: 2024-02-26

## 2024-02-26 NOTE — TELEPHONE ENCOUNTER
Care Transitions Initial Follow Up Call    Outreach made within 2 business days of discharge: Yes    Patient: Rebekah Rodriguez Patient : 1955   MRN: [id number]  Reason for Admission: There are no discharge diagnoses documented for the most recent discharge.  Discharge Date: 24       Spoke with: unable to reach or leave message    Discharge department/facility: [facility]    Phone not answered and unable to leave [facility]    Scheduled appointment with PCP within 7-14 days    Follow Up  Future Appointments   Date Time Provider Department Center   2024 10:00 AM Carlos Bond MD [facility] [place] [facility]   3/6/2024  2:15 PM Carlos A Bae MD BD GEN SURG [facility]   3/12/2024  2:40 PM Joya Garcia MD [facility]   2024 11:15 AM Edu Olivas MD BD ENDO [facility]       Goran Squires RN

## 2024-02-27 ENCOUNTER — TELEPHONE (OUTPATIENT)
Dept: FAMILY MEDICINE CLINIC | Age: 69
End: 2024-02-27

## 2024-02-27 NOTE — TELEPHONE ENCOUNTER
Care Transitions Initial Follow Up Call    Outreach made within 2 business days of discharge: Yes    Patient: Rebekah Rodriguez Patient : 1955   MRN: [id number]  Reason for Admission: There are no discharge diagnoses documented for the most recent discharge.  Discharge Date: 24       Spoke with: message left    Discharge department/facility: [facility]    Message left requesting return call and reminding of appt scheduled for 3-8-24    Scheduled appointment with PCP within 7-14 days    Follow Up  Future Appointments   Date Time Provider Department Center   3/6/2024  2:15 PM Carlos A Bae MD BDM GEN SURG [facility]   3/8/2024  2:00 PM Carlos Bond MD [facility] [place] [facility]   3/12/2024  2:40 PM Joya Garcia MD [facility]   2024  2:00 PM Celia Mason, APRN - CNP [facility]   2024 11:15 AM Edu Olivas MD [facility] ENDO [facility]       Goran Squires RN

## 2024-03-05 ENCOUNTER — TELEPHONE (OUTPATIENT)
Dept: FAMILY MEDICINE CLINIC | Age: 69
End: 2024-03-05

## 2024-03-05 DIAGNOSIS — Z76.0 MEDICATION REFILL: ICD-10-CM

## 2024-03-05 DIAGNOSIS — E87.6 POTASSIUM (K) DEFICIENCY: ICD-10-CM

## 2024-03-05 RX ORDER — MULTIVITAMIN
1 TABLET ORAL DAILY
Qty: 30 TABLET | Refills: 3 | Status: SHIPPED | OUTPATIENT
Start: 2024-03-05 | End: 2024-09-01

## 2024-03-05 RX ORDER — GUAIFENESIN 400 MG/1
400 TABLET ORAL 4 TIMES DAILY PRN
Qty: 56 TABLET | Refills: 3 | Status: SHIPPED | OUTPATIENT
Start: 2024-03-05

## 2024-03-06 ENCOUNTER — OFFICE VISIT (OUTPATIENT)
Dept: SURGERY | Age: 69
End: 2024-03-06

## 2024-03-06 VITALS
DIASTOLIC BLOOD PRESSURE: 84 MMHG | OXYGEN SATURATION: 95 % | SYSTOLIC BLOOD PRESSURE: 128 MMHG | BODY MASS INDEX: 22.76 KG/M2 | HEART RATE: 77 BPM | TEMPERATURE: 97 F | HEIGHT: 62 IN

## 2024-03-06 DIAGNOSIS — Z48.89 ENCOUNTER FOR POSTOPERATIVE CARE: Primary | ICD-10-CM

## 2024-03-06 PROCEDURE — 99024 POSTOP FOLLOW-UP VISIT: CPT | Performed by: SURGERY

## 2024-03-06 RX ORDER — DESMOPRESSIN ACETATE 0.2 MG/1
0.2 TABLET ORAL 2 TIMES DAILY
Qty: 30 TABLET | Refills: 2 | Status: SHIPPED | OUTPATIENT
Start: 2024-03-06

## 2024-03-06 NOTE — PROGRESS NOTES
General Surgery Progress Note:    Cc: post op    S: doing really well no more prolapse no pain feels good,       Objective:  @/84 (Site: Right Upper Arm, Position: Sitting, Cuff Size: Medium Adult) Comment: left upper arm manually  Pulse 77   Temp 97 °F (36.1 °C)   Ht 1.575 m (5' 2.01\")   LMP  (LMP Unknown)   SpO2 95%   BMI 22.76 kg/m² @    Physical -     Gen: NAD  Resp: Breathing Comfortably, on home O2,   CV: Reg Rate  Abd: nad  EXT nvi    Assessment/Plan: s/p perineal rectosigmoidectomy for prolapse     Doing well Follow up as needed   discussed at length keeping her on stool softeners to keep regular Bms and avoid constipation which could lead to recurrence of prolapse.                 Electronically Signed by Carlos A Bae MD FACS   3:21 PM

## 2024-03-06 NOTE — TELEPHONE ENCOUNTER
Last Appointment:  2/12/2024  Future Appointments   Date Time Provider Department Center   3/6/2024  2:15 PM Carlos A Bae MD BDM GEN SURG [facility]   3/8/2024  2:00 PM Carlos Bond MD [facility] [place] [facility]   3/12/2024  2:40 PM Joya Garcia MD [facility]   4/9/2024  2:00 PM Celia Mason, APRN - CNP [facility]   4/18/2024 11:15 AM Edu Olivas MD [facility] ENDO [facility]

## 2024-03-07 ENCOUNTER — TELEPHONE (OUTPATIENT)
Dept: FAMILY MEDICINE CLINIC | Age: 69
End: 2024-03-07

## 2024-03-07 NOTE — TELEPHONE ENCOUNTER
Patient's  called , states that the Vitamin on patients chart is not the one they requested  for refill the other day, please advise.

## 2024-03-08 RX ORDER — DIGOXIN 125 MCG
62.5 TABLET ORAL DAILY
Qty: 30 TABLET | Refills: 0 | Status: SHIPPED | OUTPATIENT
Start: 2024-03-08

## 2024-03-08 NOTE — TELEPHONE ENCOUNTER
Last Appointment:  2/12/2024  Future Appointments   Date Time Provider Department Center   3/12/2024  2:40 PM Joya Garcia MD [facility]   3/15/2024  3:00 PM Carlos Bond MD [facility]   4/9/2024  2:00 PM Celia Mason, APRN - CNP [facility]   4/18/2024 11:15 AM Edu Olivas MD [facility]

## 2024-03-11 ENCOUNTER — TELEPHONE (OUTPATIENT)
Dept: FAMILY MEDICINE CLINIC | Age: 69
End: 2024-03-11

## 2024-03-11 DIAGNOSIS — I50.33 ACUTE ON CHRONIC DIASTOLIC CHF (CONGESTIVE HEART FAILURE) (HCC): Primary | ICD-10-CM

## 2024-03-11 RX ORDER — METOPROLOL SUCCINATE 50 MG/1
50 TABLET, EXTENDED RELEASE ORAL 2 TIMES DAILY
Qty: 30 TABLET | Refills: 3 | Status: SHIPPED | OUTPATIENT
Start: 2024-03-11

## 2024-03-12 ENCOUNTER — OFFICE VISIT (OUTPATIENT)
Dept: CARDIOLOGY CLINIC | Age: 69
End: 2024-03-12
Payer: MEDICARE

## 2024-03-12 VITALS
WEIGHT: 135 LBS | SYSTOLIC BLOOD PRESSURE: 131 MMHG | RESPIRATION RATE: 16 BRPM | BODY MASS INDEX: 24.84 KG/M2 | DIASTOLIC BLOOD PRESSURE: 91 MMHG | HEART RATE: 76 BPM | HEIGHT: 62 IN

## 2024-03-12 DIAGNOSIS — I12.9 NEPHROSCLEROSIS, STAGE 1 THROUGH STAGE 4 OR UNSPECIFIED CHRONIC KIDNEY DISEASE: Primary | ICD-10-CM

## 2024-03-12 PROCEDURE — G8484 FLU IMMUNIZE NO ADMIN: HCPCS | Performed by: INTERNAL MEDICINE

## 2024-03-12 PROCEDURE — 1123F ACP DISCUSS/DSCN MKR DOCD: CPT | Performed by: INTERNAL MEDICINE

## 2024-03-12 PROCEDURE — 1090F PRES/ABSN URINE INCON ASSESS: CPT | Performed by: INTERNAL MEDICINE

## 2024-03-12 PROCEDURE — 3017F COLORECTAL CA SCREEN DOC REV: CPT | Performed by: INTERNAL MEDICINE

## 2024-03-12 PROCEDURE — G8427 DOCREV CUR MEDS BY ELIG CLIN: HCPCS | Performed by: INTERNAL MEDICINE

## 2024-03-12 PROCEDURE — 93000 ELECTROCARDIOGRAM COMPLETE: CPT | Performed by: INTERNAL MEDICINE

## 2024-03-12 PROCEDURE — 3080F DIAST BP >= 90 MM HG: CPT | Performed by: INTERNAL MEDICINE

## 2024-03-12 PROCEDURE — 1111F DSCHRG MED/CURRENT MED MERGE: CPT | Performed by: INTERNAL MEDICINE

## 2024-03-12 PROCEDURE — 1036F TOBACCO NON-USER: CPT | Performed by: INTERNAL MEDICINE

## 2024-03-12 PROCEDURE — G8399 PT W/DXA RESULTS DOCUMENT: HCPCS | Performed by: INTERNAL MEDICINE

## 2024-03-12 PROCEDURE — 3075F SYST BP GE 130 - 139MM HG: CPT | Performed by: INTERNAL MEDICINE

## 2024-03-12 PROCEDURE — G8420 CALC BMI NORM PARAMETERS: HCPCS | Performed by: INTERNAL MEDICINE

## 2024-03-12 PROCEDURE — 99214 OFFICE O/P EST MOD 30 MIN: CPT | Performed by: INTERNAL MEDICINE

## 2024-03-12 NOTE — PATIENT INSTRUCTIONS
Echo results reviewed, EF 60%, RV function decreased, mod to severe pulmonary hypertension.   Continue amlodipine 5 mg po daily for high BP  Continue Toprol 50 mg po twice a day and dig 125 mcg  and Eliquis 5 mg Po twice a day for anticoagulation  She was advised to keep well-hydrated and recommended to drink at least 2 glasses of water after going home.  Monitor blood pressure and call me with the blood pressure readings in a.m.  Patient was advised to consider watchman device for stroke prevention which she is going to think about it.  Continue Lasix 20  mg daily  Amiodarone was stopped   Continue Sildenafil 10 mg po 3 times a day.  Follow with renal service for CKD  Continue rest of the current medications  Follow-up at [facility] pulmonary hypertension clinic  Follow-up with me in 6 months.

## 2024-03-15 DIAGNOSIS — R56.9 SEIZURE (HCC): ICD-10-CM

## 2024-03-16 LAB
SEND OUT REPORT: NORMAL
TEST NAME: NORMAL

## 2024-03-18 RX ORDER — LEVETIRACETAM 500 MG/1
500 TABLET ORAL 2 TIMES DAILY
Qty: 60 TABLET | Refills: 0 | Status: SHIPPED | OUTPATIENT
Start: 2024-03-18

## 2024-03-21 ENCOUNTER — OFFICE VISIT (OUTPATIENT)
Dept: FAMILY MEDICINE CLINIC | Age: 69
End: 2024-03-21
Payer: MEDICARE

## 2024-03-21 VITALS
BODY MASS INDEX: 23.55 KG/M2 | HEART RATE: 96 BPM | RESPIRATION RATE: 16 BRPM | OXYGEN SATURATION: 99 % | TEMPERATURE: 98.3 F | WEIGHT: 128 LBS | SYSTOLIC BLOOD PRESSURE: 140 MMHG | DIASTOLIC BLOOD PRESSURE: 95 MMHG | HEIGHT: 62 IN

## 2024-03-21 DIAGNOSIS — I50.42 CHRONIC COMBINED SYSTOLIC AND DIASTOLIC HEART FAILURE (HCC): Primary | ICD-10-CM

## 2024-03-21 DIAGNOSIS — R41.0 CONFUSION: ICD-10-CM

## 2024-03-21 DIAGNOSIS — K62.3 RECTAL PROLAPSE: ICD-10-CM

## 2024-03-21 DIAGNOSIS — I27.20 PULMONARY HYPERTENSION (HCC): ICD-10-CM

## 2024-03-21 DIAGNOSIS — D86.0 PULMONARY SARCOIDOSIS (HCC): ICD-10-CM

## 2024-03-21 PROCEDURE — G8420 CALC BMI NORM PARAMETERS: HCPCS

## 2024-03-21 PROCEDURE — 1111F DSCHRG MED/CURRENT MED MERGE: CPT

## 2024-03-21 PROCEDURE — G8399 PT W/DXA RESULTS DOCUMENT: HCPCS

## 2024-03-21 PROCEDURE — 3079F DIAST BP 80-89 MM HG: CPT

## 2024-03-21 PROCEDURE — G8428 CUR MEDS NOT DOCUMENT: HCPCS

## 2024-03-21 PROCEDURE — 99213 OFFICE O/P EST LOW 20 MIN: CPT

## 2024-03-21 PROCEDURE — 3017F COLORECTAL CA SCREEN DOC REV: CPT

## 2024-03-21 PROCEDURE — 1036F TOBACCO NON-USER: CPT

## 2024-03-21 PROCEDURE — 1090F PRES/ABSN URINE INCON ASSESS: CPT

## 2024-03-21 PROCEDURE — 1123F ACP DISCUSS/DSCN MKR DOCD: CPT

## 2024-03-21 PROCEDURE — 3077F SYST BP >= 140 MM HG: CPT

## 2024-03-21 PROCEDURE — G8484 FLU IMMUNIZE NO ADMIN: HCPCS

## 2024-03-21 SDOH — ECONOMIC STABILITY: INCOME INSECURITY: HOW HARD IS IT FOR YOU TO PAY FOR THE VERY BASICS LIKE FOOD, HOUSING, MEDICAL CARE, AND HEATING?: NOT VERY HARD

## 2024-03-21 SDOH — ECONOMIC STABILITY: FOOD INSECURITY: WITHIN THE PAST 12 MONTHS, THE FOOD YOU BOUGHT JUST DIDN'T LAST AND YOU DIDN'T HAVE MONEY TO GET MORE.: NEVER TRUE

## 2024-03-21 SDOH — ECONOMIC STABILITY: FOOD INSECURITY: WITHIN THE PAST 12 MONTHS, YOU WORRIED THAT YOUR FOOD WOULD RUN OUT BEFORE YOU GOT MONEY TO BUY MORE.: NEVER TRUE

## 2024-03-21 NOTE — PROGRESS NOTES
[facility]  FAMILY MEDICINE RESIDENCY PROGRAM  DATE OF VISIT : 3/25/2024    Patient : Rebekah Rodriguez   Age : 68 y.o.    : 1955   MRN : [id number]   ______________________________________________________________________    Chief Complaint:   Chief Complaint   Patient presents with    Follow-up       HPI:   Rebekah Rodriguez is a 68 y.o. female who presents for f/u chronic conditions.    Confusion: The patient's  states that she has been getting confused frequently. He states that she will wake up in the morning and be confused about who he is and who is in the house. He states she will sometimes hallucinate though patient denies this. He states he is frequently re-orienting her. The patient declines to discuss mood symptoms today.     Pulmonary hypertension:   Pulmonary sarcoidosis: The patient is on sildenafil 20 mg TID for pulmonary hypertension. The patient states that she feels her breathing has been slow returning to baseline. She states that she feels she needs to use the BiPAP- she wears it nearly all day and all night. She has been doing that since her hospital dishcarge 2024. She has not been using her breathing treatments because she states that the breathing treatments are not effective, they only last 30-60 minutes at a time. When she is not on the BiPAP, she is on 6L of oxygen. The patient follows with [facility] pulmonology for severe pulmonary hypertension- their next appt is 2024    Rectal prolapse: The patient was admitted 2024 with rectal bleeding, had rectal prolapse that required surgical correction. Followed up with Kathia 3/6/2024- recommended continued stool softener usage. The patient has 5-6 soft bowel movements daily. No further hematochezia/melena.      Diastolic heart failure: The patient was seen by cardiology 3/6/24 who recommended no acute changes at that time. The patient reports chest pain but states it has been chronic for the last

## 2024-03-21 NOTE — PROGRESS NOTES
S: Rebekah Rodriguez 68 y.o. female  here for check up chronic conditions.  In addition,  concerns about new onset confusion.    Very complicated medical history that includes multiple episode of respiratory failure with hypoxia and cardiopulmonary arrest with intubation x 3 within the past 12 months.  Chronic adrenal insufficiency, Sarcoidosis, Pulmonary Hypertension.  Rectal prolapse 1/24 requiring surgical correction    Confusion:  Onset x 2 months.  Doesn't know where she is or who is around her.  Reports of visual hallucinations. Patient denies symptoms; when pressed to discuss mood symptoms as part of these symptoms, she declined    Pulmonary Sarcoidosis/Pulmonary Hypertension:  Follow with [facility] Pulmonary hypertension; follows with local pulmnary (Dr. FOREIGN Roy) for pulmonary sarcoidosis.  Meds include sildenafil 20 mg TID. Also oxygen requiring--6 L/min.  She finds that she is requiring Bipap continuously for comfort.  Get very temporary relief with Pulmicort and Brovana. Follows with both pulmonary groups in 4/24    Rectal Prolapse:  Jaunary 2024; presented with rectal bleeding.  This was repaired and symptoms now seem to have resolved, as long as she stays on her bowel regimen    Combined Systolic/Diatolic HFpEF:  EF 60-65%.  Followed by Cardiology (Dr. LETICIA Garcia). Wheile she has chronic CP and SOB, no acute onset or worsening of symptoms    Deconditioning:  HHC--PT ordered and comes infrequently at the request of the patient      O: VS: BP (!) 140/95   Pulse 96   Temp 98.3 °F (36.8 °C) (Temporal)   Resp 16   Ht 1.575 m (5' 2\")   Wt 58.1 kg (128 lb)   LMP  (LMP Unknown)   SpO2 99%   BMI 23.41 kg/m²    General: NAD. On O2 6L/min per nasal cannula.  She appears very depressed              HEENT: WDL              Neck: WDL   CV:  RRR, no gallops, rubs, or murmurs. Chest: tenderness to palpation of chest (chronic)   Resp: diminished breath sounds bilaterally   Abd:  Soft, nontender, no masses .

## 2024-03-25 RX ORDER — HYDROCORTISONE 5 MG/1
5 TABLET ORAL NIGHTLY
Qty: 30 TABLET | Refills: 3 | Status: SHIPPED | OUTPATIENT
Start: 2024-03-25

## 2024-03-25 NOTE — TELEPHONE ENCOUNTER
Last Appointment:  3/21/2024  Future Appointments   Date Time Provider Department Center   4/4/2024  1:00 PM Lauro Ellington MD [facility]   4/9/2024  2:00 PM Celia Mason APRN - CNP [facility]   4/18/2024 11:15 AM Edu Olivas MD [facility]   11/4/2024  2:40 PM Joya Garcia MD [facility]

## 2024-04-03 ENCOUNTER — HOSPITAL ENCOUNTER (INPATIENT)
Age: 69
LOS: 5 days | Discharge: HOME HEALTH | DRG: 193 | End: 2024-04-08
Attending: EMERGENCY MEDICINE | Admitting: FAMILY MEDICINE
Payer: MEDICARE

## 2024-04-03 ENCOUNTER — APPOINTMENT (OUTPATIENT)
Dept: GENERAL RADIOLOGY | Age: 69
DRG: 193 | End: 2024-04-03
Payer: MEDICARE

## 2024-04-03 DIAGNOSIS — R07.9 CHEST PAIN, UNSPECIFIED TYPE: ICD-10-CM

## 2024-04-03 DIAGNOSIS — J18.9 PNEUMONIA DUE TO INFECTIOUS ORGANISM, UNSPECIFIED LATERALITY, UNSPECIFIED PART OF LUNG: ICD-10-CM

## 2024-04-03 DIAGNOSIS — J44.1 ACUTE EXACERBATION OF CHRONIC OBSTRUCTIVE PULMONARY DISEASE (COPD) (HCC): Primary | ICD-10-CM

## 2024-04-03 LAB
AADO2: 104.7 MMHG
ALBUMIN SERPL-MCNC: 4.1 G/DL (ref 3.5–5.2)
ALP SERPL-CCNC: 97 U/L (ref 35–104)
ALT SERPL-CCNC: 22 U/L (ref 0–32)
ANION GAP SERPL CALCULATED.3IONS-SCNC: 9 MMOL/L (ref 7–16)
AST SERPL-CCNC: 28 U/L (ref 0–31)
ATYPICAL LYMPHOCYTE ABSOLUTE COUNT: 0.15 K/UL (ref 0–0.46)
ATYPICAL LYMPHOCYTES: 4 % (ref 0–4)
B.E.: 5.8 MMOL/L (ref -3–3)
BASOPHILS # BLD: 0.04 K/UL (ref 0–0.2)
BASOPHILS NFR BLD: 1 % (ref 0–2)
BILIRUB SERPL-MCNC: 0.3 MG/DL (ref 0–1.2)
BNP SERPL-MCNC: 4112 PG/ML (ref 0–125)
BUN SERPL-MCNC: 16 MG/DL (ref 6–23)
CALCIUM SERPL-MCNC: 9.6 MG/DL (ref 8.6–10.2)
CHLORIDE SERPL-SCNC: 104 MMOL/L (ref 98–107)
CO2 SERPL-SCNC: 32 MMOL/L (ref 22–29)
COHB: 0.8 % (ref 0–1.5)
COMMENT: ABNORMAL
CREAT SERPL-MCNC: 0.9 MG/DL (ref 0.5–1)
CRITICAL: ABNORMAL
DATE ANALYZED: ABNORMAL
DATE OF COLLECTION: ABNORMAL
EOSINOPHIL # BLD: 0.27 K/UL (ref 0.05–0.5)
EOSINOPHILS RELATIVE PERCENT: 6 % (ref 0–6)
ERYTHROCYTE [DISTWIDTH] IN BLOOD BY AUTOMATED COUNT: 15.6 % (ref 11.5–15)
FIO2: 40 %
GFR SERPL CREATININE-BSD FRML MDRD: 71 ML/MIN/1.73M2
GLUCOSE SERPL-MCNC: 94 MG/DL (ref 74–99)
HCO3: 32 MMOL/L (ref 22–26)
HCT VFR BLD AUTO: 39.6 % (ref 34–48)
HGB BLD-MCNC: 11.9 G/DL (ref 11.5–15.5)
HHB: 2.1 % (ref 0–5)
LAB: ABNORMAL
LYMPHOCYTES NFR BLD: 0.35 K/UL (ref 1.5–4)
LYMPHOCYTES RELATIVE PERCENT: 8 % (ref 20–42)
Lab: 1907
MCH RBC QN AUTO: 25.9 PG (ref 26–35)
MCHC RBC AUTO-ENTMCNC: 30.1 G/DL (ref 32–34.5)
MCV RBC AUTO: 86.1 FL (ref 80–99.9)
METHB: 0.3 % (ref 0–1.5)
MODE: ABNORMAL
MONOCYTES NFR BLD: 0.19 K/UL (ref 0.1–0.95)
MONOCYTES NFR BLD: 4 % (ref 2–12)
NEUTROPHILS NFR BLD: 77 % (ref 43–80)
NEUTS SEG NFR BLD: 3.4 K/UL (ref 1.8–7.3)
O2 SATURATION: 97.9 % (ref 92–98.5)
O2HB: 96.8 % (ref 94–97)
OPERATOR ID: 5100
PATIENT TEMP: 37 C
PCO2: 53.6 MMHG (ref 35–45)
PFO2: 2.72 MMHG/%
PH BLOOD GAS: 7.39 (ref 7.35–7.45)
PLATELET, FLUORESCENCE: 125 K/UL (ref 130–450)
PMV BLD AUTO: 10.7 FL (ref 7–12)
PO2: 108.9 MMHG (ref 75–100)
POTASSIUM SERPL-SCNC: 3.7 MMOL/L (ref 3.5–5)
PROT SERPL-MCNC: 6.8 G/DL (ref 6.4–8.3)
RBC # BLD AUTO: 4.6 M/UL (ref 3.5–5.5)
RBC # BLD: ABNORMAL 10*6/UL
RI(T): 0.96
SODIUM SERPL-SCNC: 145 MMOL/L (ref 132–146)
SOURCE, BLOOD GAS: ABNORMAL
THB: 12.5 G/DL (ref 11.5–16.5)
TIME ANALYZED: 1918
TROPONIN I SERPL HS-MCNC: 19 NG/L (ref 0–9)
TROPONIN I SERPL HS-MCNC: 20 NG/L (ref 0–9)
WBC OTHER # BLD: 4.4 K/UL (ref 4.5–11.5)

## 2024-04-03 PROCEDURE — 85025 COMPLETE CBC W/AUTO DIFF WBC: CPT

## 2024-04-03 PROCEDURE — 2500000003 HC RX 250 WO HCPCS

## 2024-04-03 PROCEDURE — 93005 ELECTROCARDIOGRAM TRACING: CPT

## 2024-04-03 PROCEDURE — 99285 EMERGENCY DEPT VISIT HI MDM: CPT

## 2024-04-03 PROCEDURE — 94640 AIRWAY INHALATION TREATMENT: CPT

## 2024-04-03 PROCEDURE — 2580000003 HC RX 258

## 2024-04-03 PROCEDURE — 94664 DEMO&/EVAL PT USE INHALER: CPT

## 2024-04-03 PROCEDURE — 6360000002 HC RX W HCPCS

## 2024-04-03 PROCEDURE — 5A09457 ASSISTANCE WITH RESPIRATORY VENTILATION, 24-96 CONSECUTIVE HOURS, CONTINUOUS POSITIVE AIRWAY PRESSURE: ICD-10-PCS

## 2024-04-03 PROCEDURE — 80053 COMPREHEN METABOLIC PANEL: CPT

## 2024-04-03 PROCEDURE — 6370000000 HC RX 637 (ALT 250 FOR IP)

## 2024-04-03 PROCEDURE — 83880 ASSAY OF NATRIURETIC PEPTIDE: CPT

## 2024-04-03 PROCEDURE — 84484 ASSAY OF TROPONIN QUANT: CPT

## 2024-04-03 PROCEDURE — 71045 X-RAY EXAM CHEST 1 VIEW: CPT

## 2024-04-03 PROCEDURE — 94660 CPAP INITIATION&MGMT: CPT

## 2024-04-03 PROCEDURE — 2060000000 HC ICU INTERMEDIATE R&B

## 2024-04-03 PROCEDURE — 82805 BLOOD GASES W/O2 SATURATION: CPT

## 2024-04-03 RX ORDER — IPRATROPIUM BROMIDE AND ALBUTEROL SULFATE 2.5; .5 MG/3ML; MG/3ML
3 SOLUTION RESPIRATORY (INHALATION) ONCE
Status: COMPLETED | OUTPATIENT
Start: 2024-04-03 | End: 2024-04-03

## 2024-04-03 RX ORDER — MORPHINE SULFATE 4 MG/ML
4 INJECTION, SOLUTION INTRAMUSCULAR; INTRAVENOUS ONCE
Status: COMPLETED | OUTPATIENT
Start: 2024-04-03 | End: 2024-04-03

## 2024-04-03 RX ADMIN — WATER 2000 MG: 1 INJECTION INTRAMUSCULAR; INTRAVENOUS; SUBCUTANEOUS at 21:07

## 2024-04-03 RX ADMIN — MORPHINE SULFATE 4 MG: 4 INJECTION, SOLUTION INTRAMUSCULAR; INTRAVENOUS at 21:04

## 2024-04-03 RX ADMIN — IPRATROPIUM BROMIDE AND ALBUTEROL SULFATE 3 DOSE: 2.5; .5 SOLUTION RESPIRATORY (INHALATION) at 18:32

## 2024-04-03 RX ADMIN — DOXYCYCLINE 100 MG: 100 INJECTION, POWDER, LYOPHILIZED, FOR SOLUTION INTRAVENOUS at 21:11

## 2024-04-03 ASSESSMENT — LIFESTYLE VARIABLES
HOW MANY STANDARD DRINKS CONTAINING ALCOHOL DO YOU HAVE ON A TYPICAL DAY: PATIENT DOES NOT DRINK
HOW OFTEN DO YOU HAVE A DRINK CONTAINING ALCOHOL: NEVER

## 2024-04-03 ASSESSMENT — PAIN DESCRIPTION - LOCATION: LOCATION: CHEST

## 2024-04-03 ASSESSMENT — PAIN DESCRIPTION - DESCRIPTORS: DESCRIPTORS: DISCOMFORT

## 2024-04-03 ASSESSMENT — PAIN SCALES - GENERAL: PAINLEVEL_OUTOF10: 9

## 2024-04-03 ASSESSMENT — PAIN DESCRIPTION - ORIENTATION: ORIENTATION: RIGHT

## 2024-04-04 LAB
AADO2: 130.2 MMHG
ALBUMIN SERPL-MCNC: 3.7 G/DL (ref 3.5–5.2)
ALP SERPL-CCNC: 84 U/L (ref 35–104)
ALT SERPL-CCNC: 17 U/L (ref 0–32)
ANION GAP SERPL CALCULATED.3IONS-SCNC: 9 MMOL/L (ref 7–16)
AST SERPL-CCNC: 22 U/L (ref 0–31)
B PARAP IS1001 DNA NPH QL NAA+NON-PROBE: NOT DETECTED
B PERT DNA SPEC QL NAA+PROBE: NOT DETECTED
B.E.: 2.2 MMOL/L (ref -3–3)
BASOPHILS # BLD: 0.02 K/UL (ref 0–0.2)
BASOPHILS NFR BLD: 0 % (ref 0–2)
BILIRUB SERPL-MCNC: 0.3 MG/DL (ref 0–1.2)
BILIRUB UR QL STRIP: NEGATIVE
BUN SERPL-MCNC: 18 MG/DL (ref 6–23)
C PNEUM DNA NPH QL NAA+NON-PROBE: NOT DETECTED
CALCIUM SERPL-MCNC: 9.1 MG/DL (ref 8.6–10.2)
CHLORIDE SERPL-SCNC: 105 MMOL/L (ref 98–107)
CLARITY UR: CLEAR
CO2 SERPL-SCNC: 28 MMOL/L (ref 22–29)
COHB: 0.6 % (ref 0–1.5)
COLOR UR: YELLOW
CREAT SERPL-MCNC: 0.9 MG/DL (ref 0.5–1)
CRITICAL: ABNORMAL
DATE ANALYZED: ABNORMAL
DATE OF COLLECTION: ABNORMAL
EKG ATRIAL RATE: 54 BPM
EKG Q-T INTERVAL: 380 MS
EKG QRS DURATION: 74 MS
EKG QTC CALCULATION (BAZETT): 376 MS
EKG R AXIS: 83 DEGREES
EKG T AXIS: -120 DEGREES
EKG VENTRICULAR RATE: 59 BPM
EOSINOPHIL # BLD: 0.12 K/UL (ref 0.05–0.5)
EOSINOPHILS RELATIVE PERCENT: 2 % (ref 0–6)
ERYTHROCYTE [DISTWIDTH] IN BLOOD BY AUTOMATED COUNT: 15.5 % (ref 11.5–15)
FIO2: 40 %
FLUAV RNA NPH QL NAA+NON-PROBE: NOT DETECTED
FLUBV RNA NPH QL NAA+NON-PROBE: NOT DETECTED
GFR SERPL CREATININE-BSD FRML MDRD: 75 ML/MIN/1.73M2
GLUCOSE SERPL-MCNC: 108 MG/DL (ref 74–99)
GLUCOSE UR STRIP-MCNC: NEGATIVE MG/DL
HADV DNA NPH QL NAA+NON-PROBE: NOT DETECTED
HCO3: 27.7 MMOL/L (ref 22–26)
HCOV 229E RNA NPH QL NAA+NON-PROBE: NOT DETECTED
HCOV HKU1 RNA NPH QL NAA+NON-PROBE: NOT DETECTED
HCOV NL63 RNA NPH QL NAA+NON-PROBE: NOT DETECTED
HCOV OC43 RNA NPH QL NAA+NON-PROBE: NOT DETECTED
HCT VFR BLD AUTO: 36.5 % (ref 34–48)
HGB BLD-MCNC: 11.2 G/DL (ref 11.5–15.5)
HGB UR QL STRIP.AUTO: NEGATIVE
HHB: 3.5 % (ref 0–5)
HMPV RNA NPH QL NAA+NON-PROBE: NOT DETECTED
HPIV1 RNA NPH QL NAA+NON-PROBE: NOT DETECTED
HPIV2 RNA NPH QL NAA+NON-PROBE: NOT DETECTED
HPIV3 RNA NPH QL NAA+NON-PROBE: NOT DETECTED
HPIV4 RNA NPH QL NAA+NON-PROBE: NOT DETECTED
IMM GRANULOCYTES # BLD AUTO: <0.03 K/UL (ref 0–0.58)
IMM GRANULOCYTES NFR BLD: 0 % (ref 0–5)
KETONES UR STRIP-MCNC: NEGATIVE MG/DL
L PNEUMO1 AG UR QL IA.RAPID: NEGATIVE
LAB: ABNORMAL
LEUKOCYTE ESTERASE UR QL STRIP: NEGATIVE
LYMPHOCYTES NFR BLD: 0.48 K/UL (ref 1.5–4)
LYMPHOCYTES RELATIVE PERCENT: 8 % (ref 20–42)
Lab: 442
M PNEUMO DNA NPH QL NAA+NON-PROBE: NOT DETECTED
MCH RBC QN AUTO: 26.3 PG (ref 26–35)
MCHC RBC AUTO-ENTMCNC: 30.7 G/DL (ref 32–34.5)
MCV RBC AUTO: 85.7 FL (ref 80–99.9)
METHB: 0.3 % (ref 0–1.5)
MODE: ABNORMAL
MONOCYTES NFR BLD: 0.22 K/UL (ref 0.1–0.95)
MONOCYTES NFR BLD: 4 % (ref 2–12)
NEUTROPHILS NFR BLD: 85 % (ref 43–80)
NEUTS SEG NFR BLD: 4.92 K/UL (ref 1.8–7.3)
NITRITE UR QL STRIP: POSITIVE
O2 SATURATION: 96.5 % (ref 92–98.5)
O2HB: 95.6 % (ref 94–97)
OPERATOR ID: ABNORMAL
PATIENT TEMP: 37 C
PCO2: 47.1 MMHG (ref 35–45)
PFO2: 2.27 MMHG/%
PH BLOOD GAS: 7.39 (ref 7.35–7.45)
PH UR STRIP: 7 [PH] (ref 5–9)
PLATELET, FLUORESCENCE: 106 K/UL (ref 130–450)
PMV BLD AUTO: 12.4 FL (ref 7–12)
PO2: 90.8 MMHG (ref 75–100)
POTASSIUM SERPL-SCNC: 3.7 MMOL/L (ref 3.5–5)
PROCALCITONIN SERPL-MCNC: 0.14 NG/ML (ref 0–0.08)
PROT SERPL-MCNC: 6.2 G/DL (ref 6.4–8.3)
PROT UR STRIP-MCNC: ABNORMAL MG/DL
RBC # BLD AUTO: 4.26 M/UL (ref 3.5–5.5)
RBC # BLD: ABNORMAL 10*6/UL
RBC #/AREA URNS HPF: ABNORMAL /HPF
RI(T): 1.43
RSV RNA NPH QL NAA+NON-PROBE: NOT DETECTED
RV+EV RNA NPH QL NAA+NON-PROBE: NOT DETECTED
S PNEUM AG SPEC QL: NEGATIVE
SARS-COV-2 RNA NPH QL NAA+NON-PROBE: NOT DETECTED
SODIUM SERPL-SCNC: 142 MMOL/L (ref 132–146)
SOURCE, BLOOD GAS: ABNORMAL
SP GR UR STRIP: 1.02 (ref 1–1.03)
SPECIMEN DESCRIPTION: NORMAL
SPECIMEN SOURCE: NORMAL
THB: 12.2 G/DL (ref 11.5–16.5)
TIME ANALYZED: 503
UROBILINOGEN UR STRIP-ACNC: 0.2 EU/DL (ref 0–1)
WBC #/AREA URNS HPF: ABNORMAL /HPF
WBC OTHER # BLD: 5.8 K/UL (ref 4.5–11.5)

## 2024-04-04 PROCEDURE — 2700000000 HC OXYGEN THERAPY PER DAY

## 2024-04-04 PROCEDURE — 6370000000 HC RX 637 (ALT 250 FOR IP)

## 2024-04-04 PROCEDURE — 82805 BLOOD GASES W/O2 SATURATION: CPT

## 2024-04-04 PROCEDURE — 93010 ELECTROCARDIOGRAM REPORT: CPT | Performed by: INTERNAL MEDICINE

## 2024-04-04 PROCEDURE — 2580000003 HC RX 258

## 2024-04-04 PROCEDURE — 80053 COMPREHEN METABOLIC PANEL: CPT

## 2024-04-04 PROCEDURE — 87040 BLOOD CULTURE FOR BACTERIA: CPT

## 2024-04-04 PROCEDURE — 94660 CPAP INITIATION&MGMT: CPT

## 2024-04-04 PROCEDURE — 97161 PT EVAL LOW COMPLEX 20 MIN: CPT

## 2024-04-04 PROCEDURE — 6360000002 HC RX W HCPCS

## 2024-04-04 PROCEDURE — 84145 PROCALCITONIN (PCT): CPT

## 2024-04-04 PROCEDURE — 87449 NOS EACH ORGANISM AG IA: CPT

## 2024-04-04 PROCEDURE — 99222 1ST HOSP IP/OBS MODERATE 55: CPT | Performed by: FAMILY MEDICINE

## 2024-04-04 PROCEDURE — 0202U NFCT DS 22 TRGT SARS-COV-2: CPT

## 2024-04-04 PROCEDURE — 92610 EVALUATE SWALLOWING FUNCTION: CPT

## 2024-04-04 PROCEDURE — 87154 CUL TYP ID BLD PTHGN 6+ TRGT: CPT

## 2024-04-04 PROCEDURE — 97530 THERAPEUTIC ACTIVITIES: CPT

## 2024-04-04 PROCEDURE — 81001 URINALYSIS AUTO W/SCOPE: CPT

## 2024-04-04 PROCEDURE — 94640 AIRWAY INHALATION TREATMENT: CPT

## 2024-04-04 PROCEDURE — 85025 COMPLETE CBC W/AUTO DIFF WBC: CPT

## 2024-04-04 PROCEDURE — 97535 SELF CARE MNGMENT TRAINING: CPT

## 2024-04-04 PROCEDURE — 86403 PARTICLE AGGLUT ANTBDY SCRN: CPT

## 2024-04-04 PROCEDURE — 97165 OT EVAL LOW COMPLEX 30 MIN: CPT

## 2024-04-04 PROCEDURE — 87899 AGENT NOS ASSAY W/OPTIC: CPT

## 2024-04-04 PROCEDURE — 92526 ORAL FUNCTION THERAPY: CPT

## 2024-04-04 PROCEDURE — 2060000000 HC ICU INTERMEDIATE R&B

## 2024-04-04 RX ORDER — OMEGA-3-ACID ETHYL ESTERS 1 G/1
2 CAPSULE, LIQUID FILLED ORAL 2 TIMES DAILY
Status: DISCONTINUED | OUTPATIENT
Start: 2024-04-04 | End: 2024-04-08 | Stop reason: HOSPADM

## 2024-04-04 RX ORDER — ALBUTEROL SULFATE 2.5 MG/3ML
2.5 SOLUTION RESPIRATORY (INHALATION) EVERY 6 HOURS PRN
Status: DISCONTINUED | OUTPATIENT
Start: 2024-04-04 | End: 2024-04-08 | Stop reason: HOSPADM

## 2024-04-04 RX ORDER — LEVOTHYROXINE SODIUM 0.05 MG/1
50 TABLET ORAL DAILY
Status: DISCONTINUED | OUTPATIENT
Start: 2024-04-04 | End: 2024-04-08 | Stop reason: HOSPADM

## 2024-04-04 RX ORDER — FAMOTIDINE 20 MG/1
20 TABLET, FILM COATED ORAL DAILY
Status: DISCONTINUED | OUTPATIENT
Start: 2024-04-04 | End: 2024-04-08 | Stop reason: HOSPADM

## 2024-04-04 RX ORDER — POTASSIUM CHLORIDE 20 MEQ/1
20 TABLET, EXTENDED RELEASE ORAL DAILY
Status: DISCONTINUED | OUTPATIENT
Start: 2024-04-04 | End: 2024-04-08 | Stop reason: HOSPADM

## 2024-04-04 RX ORDER — POLYETHYLENE GLYCOL 3350 17 G/17G
17 POWDER, FOR SOLUTION ORAL DAILY PRN
Status: DISCONTINUED | OUTPATIENT
Start: 2024-04-04 | End: 2024-04-08 | Stop reason: HOSPADM

## 2024-04-04 RX ORDER — GUAIFENESIN 400 MG/1
400 TABLET ORAL 3 TIMES DAILY
Status: DISCONTINUED | OUTPATIENT
Start: 2024-04-04 | End: 2024-04-08 | Stop reason: HOSPADM

## 2024-04-04 RX ORDER — IPRATROPIUM BROMIDE AND ALBUTEROL SULFATE 2.5; .5 MG/3ML; MG/3ML
1 SOLUTION RESPIRATORY (INHALATION)
Status: DISCONTINUED | OUTPATIENT
Start: 2024-04-04 | End: 2024-04-08 | Stop reason: HOSPADM

## 2024-04-04 RX ORDER — SILDENAFIL CITRATE 20 MG/1
10 TABLET ORAL 3 TIMES DAILY
Status: DISCONTINUED | OUTPATIENT
Start: 2024-04-04 | End: 2024-04-08 | Stop reason: HOSPADM

## 2024-04-04 RX ORDER — HYDROCORTISONE 5 MG/1
5 TABLET ORAL NIGHTLY
Status: DISCONTINUED | OUTPATIENT
Start: 2024-04-05 | End: 2024-04-08 | Stop reason: HOSPADM

## 2024-04-04 RX ORDER — VITAMIN B COMPLEX
1000 TABLET ORAL DAILY
Status: DISCONTINUED | OUTPATIENT
Start: 2024-04-04 | End: 2024-04-08 | Stop reason: HOSPADM

## 2024-04-04 RX ORDER — POLYETHYLENE GLYCOL 3350 17 G/17G
17 POWDER, FOR SOLUTION ORAL DAILY
Status: DISCONTINUED | OUTPATIENT
Start: 2024-04-04 | End: 2024-04-06

## 2024-04-04 RX ORDER — FUROSEMIDE 20 MG/1
20 TABLET ORAL DAILY
Status: DISCONTINUED | OUTPATIENT
Start: 2024-04-04 | End: 2024-04-08 | Stop reason: HOSPADM

## 2024-04-04 RX ORDER — SODIUM CHLORIDE 0.9 % (FLUSH) 0.9 %
5-40 SYRINGE (ML) INJECTION PRN
Status: DISCONTINUED | OUTPATIENT
Start: 2024-04-04 | End: 2024-04-08 | Stop reason: HOSPADM

## 2024-04-04 RX ORDER — DIGOXIN 125 MCG
62.5 TABLET ORAL DAILY
Status: DISCONTINUED | OUTPATIENT
Start: 2024-04-04 | End: 2024-04-08 | Stop reason: HOSPADM

## 2024-04-04 RX ORDER — METOPROLOL SUCCINATE 50 MG/1
50 TABLET, EXTENDED RELEASE ORAL 2 TIMES DAILY
Status: DISCONTINUED | OUTPATIENT
Start: 2024-04-04 | End: 2024-04-07

## 2024-04-04 RX ORDER — AMLODIPINE BESYLATE 5 MG/1
5 TABLET ORAL DAILY
Status: DISCONTINUED | OUTPATIENT
Start: 2024-04-04 | End: 2024-04-08 | Stop reason: HOSPADM

## 2024-04-04 RX ORDER — GUAIFENESIN 400 MG/1
400 TABLET ORAL 4 TIMES DAILY PRN
Status: DISCONTINUED | OUTPATIENT
Start: 2024-04-04 | End: 2024-04-08 | Stop reason: HOSPADM

## 2024-04-04 RX ORDER — DULOXETIN HYDROCHLORIDE 60 MG/1
60 CAPSULE, DELAYED RELEASE ORAL DAILY
Status: DISCONTINUED | OUTPATIENT
Start: 2024-04-04 | End: 2024-04-08 | Stop reason: HOSPADM

## 2024-04-04 RX ORDER — FERROUS SULFATE 325(65) MG
325 TABLET ORAL 2 TIMES DAILY
Status: DISCONTINUED | OUTPATIENT
Start: 2024-04-04 | End: 2024-04-08 | Stop reason: HOSPADM

## 2024-04-04 RX ORDER — SODIUM CHLORIDE 0.9 % (FLUSH) 0.9 %
5-40 SYRINGE (ML) INJECTION EVERY 12 HOURS SCHEDULED
Status: DISCONTINUED | OUTPATIENT
Start: 2024-04-04 | End: 2024-04-08 | Stop reason: HOSPADM

## 2024-04-04 RX ORDER — ACETAMINOPHEN 325 MG/1
650 TABLET ORAL EVERY 6 HOURS PRN
Status: DISCONTINUED | OUTPATIENT
Start: 2024-04-04 | End: 2024-04-08 | Stop reason: HOSPADM

## 2024-04-04 RX ORDER — BUDESONIDE 0.5 MG/2ML
500 INHALANT ORAL 2 TIMES DAILY
Status: DISCONTINUED | OUTPATIENT
Start: 2024-04-04 | End: 2024-04-08 | Stop reason: HOSPADM

## 2024-04-04 RX ORDER — ONDANSETRON 2 MG/ML
4 INJECTION INTRAMUSCULAR; INTRAVENOUS EVERY 6 HOURS PRN
Status: DISCONTINUED | OUTPATIENT
Start: 2024-04-04 | End: 2024-04-08 | Stop reason: HOSPADM

## 2024-04-04 RX ORDER — ARFORMOTEROL TARTRATE 15 UG/2ML
15 SOLUTION RESPIRATORY (INHALATION)
Status: DISCONTINUED | OUTPATIENT
Start: 2024-04-04 | End: 2024-04-08 | Stop reason: HOSPADM

## 2024-04-04 RX ORDER — ONDANSETRON 4 MG/1
4 TABLET, ORALLY DISINTEGRATING ORAL EVERY 8 HOURS PRN
Status: DISCONTINUED | OUTPATIENT
Start: 2024-04-04 | End: 2024-04-08 | Stop reason: HOSPADM

## 2024-04-04 RX ORDER — SODIUM CHLORIDE 9 MG/ML
INJECTION, SOLUTION INTRAVENOUS PRN
Status: DISCONTINUED | OUTPATIENT
Start: 2024-04-04 | End: 2024-04-08 | Stop reason: HOSPADM

## 2024-04-04 RX ORDER — TIZANIDINE 4 MG/1
2 TABLET ORAL EVERY 6 HOURS PRN
Status: DISCONTINUED | OUTPATIENT
Start: 2024-04-04 | End: 2024-04-07

## 2024-04-04 RX ORDER — CIPROFLOXACIN 500 MG/1
500 TABLET, FILM COATED ORAL EVERY 12 HOURS SCHEDULED
Status: DISCONTINUED | OUTPATIENT
Start: 2024-04-04 | End: 2024-04-08 | Stop reason: HOSPADM

## 2024-04-04 RX ORDER — LEVETIRACETAM 500 MG/1
500 TABLET ORAL 2 TIMES DAILY
Status: DISCONTINUED | OUTPATIENT
Start: 2024-04-04 | End: 2024-04-08 | Stop reason: HOSPADM

## 2024-04-04 RX ORDER — ACETAMINOPHEN 650 MG/1
650 SUPPOSITORY RECTAL EVERY 6 HOURS PRN
Status: DISCONTINUED | OUTPATIENT
Start: 2024-04-04 | End: 2024-04-08 | Stop reason: HOSPADM

## 2024-04-04 RX ORDER — MULTIVITAMIN WITH IRON
1 TABLET ORAL DAILY
Status: DISCONTINUED | OUTPATIENT
Start: 2024-04-04 | End: 2024-04-08 | Stop reason: HOSPADM

## 2024-04-04 RX ORDER — DESMOPRESSIN ACETATE 0.1 MG/1
200 TABLET ORAL 2 TIMES DAILY
Status: DISCONTINUED | OUTPATIENT
Start: 2024-04-04 | End: 2024-04-08 | Stop reason: HOSPADM

## 2024-04-04 RX ADMIN — DIGOXIN 62.5 MCG: 125 TABLET ORAL at 12:23

## 2024-04-04 RX ADMIN — ARFORMOTEROL TARTRATE 15 MCG: 15 SOLUTION RESPIRATORY (INHALATION) at 17:17

## 2024-04-04 RX ADMIN — FUROSEMIDE 20 MG: 20 TABLET ORAL at 12:23

## 2024-04-04 RX ADMIN — OMEGA-3-ACID ETHYL ESTERS 2 G: 1 CAPSULE, LIQUID FILLED ORAL at 21:01

## 2024-04-04 RX ADMIN — LEVETIRACETAM 500 MG: 500 TABLET, FILM COATED ORAL at 21:01

## 2024-04-04 RX ADMIN — CIPROFLOXACIN HYDROCHLORIDE 500 MG: 500 TABLET, FILM COATED ORAL at 01:51

## 2024-04-04 RX ADMIN — DESMOPRESSIN ACETATE 200 MCG: 0.1 TABLET ORAL at 21:03

## 2024-04-04 RX ADMIN — TIZANIDINE 2 MG: 4 TABLET ORAL at 12:32

## 2024-04-04 RX ADMIN — ARFORMOTEROL TARTRATE 15 MCG: 15 SOLUTION RESPIRATORY (INHALATION) at 07:11

## 2024-04-04 RX ADMIN — DESMOPRESSIN ACETATE 200 MCG: 0.1 TABLET ORAL at 12:32

## 2024-04-04 RX ADMIN — GUAIFENESIN 400 MG: 400 TABLET ORAL at 12:23

## 2024-04-04 RX ADMIN — ACETAMINOPHEN 650 MG: 325 TABLET ORAL at 01:54

## 2024-04-04 RX ADMIN — METOPROLOL SUCCINATE 50 MG: 50 TABLET, FILM COATED, EXTENDED RELEASE ORAL at 12:23

## 2024-04-04 RX ADMIN — SILDENAFIL 10 MG: 20 TABLET, FILM COATED ORAL at 21:02

## 2024-04-04 RX ADMIN — IPRATROPIUM BROMIDE AND ALBUTEROL SULFATE 1 DOSE: 2.5; .5 SOLUTION RESPIRATORY (INHALATION) at 07:11

## 2024-04-04 RX ADMIN — DESMOPRESSIN ACETATE 200 MCG: 0.1 TABLET ORAL at 02:04

## 2024-04-04 RX ADMIN — IPRATROPIUM BROMIDE AND ALBUTEROL SULFATE 1 DOSE: 2.5; .5 SOLUTION RESPIRATORY (INHALATION) at 21:04

## 2024-04-04 RX ADMIN — APIXABAN 5 MG: 5 TABLET, FILM COATED ORAL at 21:01

## 2024-04-04 RX ADMIN — IPRATROPIUM BROMIDE AND ALBUTEROL SULFATE 1 DOSE: 2.5; .5 SOLUTION RESPIRATORY (INHALATION) at 17:17

## 2024-04-04 RX ADMIN — FERROUS SULFATE TAB 325 MG (65 MG ELEMENTAL FE) 325 MG: 325 (65 FE) TAB at 12:23

## 2024-04-04 RX ADMIN — IPRATROPIUM BROMIDE AND ALBUTEROL SULFATE 1 DOSE: 2.5; .5 SOLUTION RESPIRATORY (INHALATION) at 13:39

## 2024-04-04 RX ADMIN — METOPROLOL SUCCINATE 50 MG: 50 TABLET, FILM COATED, EXTENDED RELEASE ORAL at 21:01

## 2024-04-04 RX ADMIN — SODIUM CHLORIDE, PRESERVATIVE FREE 10 ML: 5 INJECTION INTRAVENOUS at 12:25

## 2024-04-04 RX ADMIN — WATER 40 MG: 1 INJECTION INTRAMUSCULAR; INTRAVENOUS; SUBCUTANEOUS at 12:23

## 2024-04-04 RX ADMIN — LEVETIRACETAM 500 MG: 500 TABLET, FILM COATED ORAL at 12:23

## 2024-04-04 RX ADMIN — DULOXETINE HYDROCHLORIDE 60 MG: 60 CAPSULE, DELAYED RELEASE ORAL at 12:23

## 2024-04-04 RX ADMIN — Medication 1000 UNITS: at 12:23

## 2024-04-04 RX ADMIN — BUDESONIDE INHALATION 500 MCG: 0.5 SUSPENSION RESPIRATORY (INHALATION) at 17:17

## 2024-04-04 RX ADMIN — AMLODIPINE BESYLATE 5 MG: 5 TABLET ORAL at 12:23

## 2024-04-04 RX ADMIN — FERROUS SULFATE TAB 325 MG (65 MG ELEMENTAL FE) 325 MG: 325 (65 FE) TAB at 01:54

## 2024-04-04 RX ADMIN — TIZANIDINE 2 MG: 4 TABLET ORAL at 01:51

## 2024-04-04 RX ADMIN — FAMOTIDINE 20 MG: 20 TABLET, FILM COATED ORAL at 12:23

## 2024-04-04 RX ADMIN — LEVETIRACETAM 500 MG: 500 TABLET, FILM COATED ORAL at 01:51

## 2024-04-04 RX ADMIN — APIXABAN 5 MG: 5 TABLET, FILM COATED ORAL at 01:53

## 2024-04-04 RX ADMIN — CIPROFLOXACIN HYDROCHLORIDE 500 MG: 500 TABLET, FILM COATED ORAL at 21:01

## 2024-04-04 RX ADMIN — GUAIFENESIN 400 MG: 400 TABLET ORAL at 17:49

## 2024-04-04 RX ADMIN — POTASSIUM CHLORIDE 20 MEQ: 1500 TABLET, EXTENDED RELEASE ORAL at 12:32

## 2024-04-04 RX ADMIN — METOPROLOL SUCCINATE 50 MG: 50 TABLET, FILM COATED, EXTENDED RELEASE ORAL at 01:54

## 2024-04-04 RX ADMIN — APIXABAN 5 MG: 5 TABLET, FILM COATED ORAL at 12:23

## 2024-04-04 RX ADMIN — OMEGA-3-ACID ETHYL ESTERS 2 G: 1 CAPSULE, LIQUID FILLED ORAL at 12:32

## 2024-04-04 RX ADMIN — SILDENAFIL 10 MG: 20 TABLET, FILM COATED ORAL at 12:33

## 2024-04-04 RX ADMIN — FERROUS SULFATE TAB 325 MG (65 MG ELEMENTAL FE) 325 MG: 325 (65 FE) TAB at 21:01

## 2024-04-04 RX ADMIN — WATER 40 MG: 1 INJECTION INTRAMUSCULAR; INTRAVENOUS; SUBCUTANEOUS at 02:16

## 2024-04-04 RX ADMIN — ACETAMINOPHEN 650 MG: 325 TABLET ORAL at 12:31

## 2024-04-04 RX ADMIN — BUDESONIDE INHALATION 500 MCG: 0.5 SUSPENSION RESPIRATORY (INHALATION) at 07:11

## 2024-04-04 ASSESSMENT — PAIN DESCRIPTION - LOCATION: LOCATION: GENERALIZED

## 2024-04-04 ASSESSMENT — PAIN SCALES - GENERAL: PAINLEVEL_OUTOF10: 6

## 2024-04-04 ASSESSMENT — PAIN DESCRIPTION - DESCRIPTORS: DESCRIPTORS: ACHING

## 2024-04-04 NOTE — ED PROVIDER NOTES
[facility] EMERGENCY DEPARTMENT  EMERGENCY DEPARTMENT ENCOUNTER      Pt Name: Rebekah Rodriguez  MRN: [id number]  Birthdate 1955  Date of evaluation: 4/3/2024  Provider: Abilio Jon DO  PCP: Lauro Ellington MD    HPI: 68-year-old female presenting emerged part complaints of shortness of breath.  Patient chronically on BiPAP reported to feel worsening shortness of breath throughout the day today with chest pain.  Patient reports she has been taking her medications as prescribed.  EMS reporting that when they attempted to transition it to their BiPAP patient dropped immediately to 70% on room air.  No family at bedside.  Following basic commands.  Alert and oriented x 4.  Patient reported to be more confused at home per  prior to EMS arrival.    Chief Complaint   Patient presents with    Shortness of Breath     Increase SOB beginning today. C/O chest pain. +thinners. Wears BiPAP all day       Review of Systems   Constitutional:  Positive for fatigue. Negative for chills and fever.   HENT:  Negative for trouble swallowing and voice change.    Eyes:  Negative for photophobia and visual disturbance.   Respiratory:  Positive for cough and shortness of breath. Negative for wheezing.    Cardiovascular:  Negative for chest pain.   Gastrointestinal:  Negative for abdominal pain, diarrhea, nausea and vomiting.   Genitourinary:  Positive for frequency. Negative for dysuria and urgency.   Musculoskeletal:  Negative for arthralgias, back pain, neck pain and neck stiffness.   Skin:  Negative for wound.   Neurological:  Negative for dizziness, syncope, weakness, numbness and headaches.   Psychiatric/Behavioral:  Negative for behavioral problems and confusion.         Physical Exam  Vitals reviewed.   Constitutional:       General: She is not in acute distress.     Appearance: Normal appearance.   HENT:      Head: Normocephalic.      Right Ear: External ear normal.      Left Ear:

## 2024-04-04 NOTE — CONSULTS
from 25 to 18 mmHg. The worsening PVR was due to combination of reduced CO and reduction in PAWP. Pt was diuresed with IV Lasix. September 22, 2023 she was started on sildenafil, 10 mg p.o. 3 times daily.      She presented to the [facility]3/2024 complaining of shortness of breath fatigue and cough for over 3 weeks.  Symptoms started right after she was discharged from the hospital last time 2/23/2024.  She was using her NIV more at home with little relief in symptoms.    Radiology review-chest x-ray with mild volume overload increased new density right lateral lung base.  Last CT chest On file from December 2023 with stable nodular opacity right middle lobe 2.2 x 1 cm.  Irregular opacities bilateral lung bases stable subsegmental atelectasis and scarring.  Background of emphysema.  Cardiomegaly.  Multiple compression fractures involving thoracic spine.    Lab testing-Legionella and strep urine antigen negative.  Blood cultures no growth 24 hours, WBCs 5.8, procalcitonin 0.14, BUN 18 creatinine 0.9, sodium 142 potassium 3.7, urinalysis positive nitrates no bacteria, ABG 7.38/47/90/27/2.2/95% on BiPAP FiO2 40%, proBNP 4112, high-sensitivity troponin 20.    She received 1 dose each of Rocephin and doxycycline for CAP coverage.  She was having episodes of desaturation at home on baseline oxygen 6 L.  She takes Eliquis for A-fib and history of blood clots and has not missed any doses.        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

## 2024-04-04 NOTE — ED NOTES
Patient has become more confused. Several attempts made to contact the patients  Reg. Answering machine picked up and message wa left for him to contact me at the ED or to come to the ED per the patients request.

## 2024-04-05 LAB
ALBUMIN SERPL-MCNC: 3.7 G/DL (ref 3.5–5.2)
ALP SERPL-CCNC: 83 U/L (ref 35–104)
ALT SERPL-CCNC: 18 U/L (ref 0–32)
ANION GAP SERPL CALCULATED.3IONS-SCNC: 14 MMOL/L (ref 7–16)
AST SERPL-CCNC: 19 U/L (ref 0–31)
BASOPHILS # BLD: 0.04 K/UL (ref 0–0.2)
BASOPHILS NFR BLD: 1 % (ref 0–2)
BILIRUB SERPL-MCNC: 0.4 MG/DL (ref 0–1.2)
BUN SERPL-MCNC: 27 MG/DL (ref 6–23)
CALCIUM SERPL-MCNC: 9.2 MG/DL (ref 8.6–10.2)
CHLORIDE SERPL-SCNC: 104 MMOL/L (ref 98–107)
CO2 SERPL-SCNC: 24 MMOL/L (ref 22–29)
CREAT SERPL-MCNC: 0.9 MG/DL (ref 0.5–1)
EKG ATRIAL RATE: 340 BPM
EKG Q-T INTERVAL: 400 MS
EKG QRS DURATION: 76 MS
EKG QTC CALCULATION (BAZETT): 386 MS
EKG R AXIS: 98 DEGREES
EKG T AXIS: -134 DEGREES
EKG VENTRICULAR RATE: 56 BPM
EOSINOPHIL # BLD: 0 K/UL (ref 0.05–0.5)
EOSINOPHILS RELATIVE PERCENT: 0 % (ref 0–6)
ERYTHROCYTE [DISTWIDTH] IN BLOOD BY AUTOMATED COUNT: 15.4 % (ref 11.5–15)
GFR SERPL CREATININE-BSD FRML MDRD: 70 ML/MIN/1.73M2
GLUCOSE SERPL-MCNC: 88 MG/DL (ref 74–99)
HCT VFR BLD AUTO: 35.2 % (ref 34–48)
HGB BLD-MCNC: 10.9 G/DL (ref 11.5–15.5)
LYMPHOCYTES NFR BLD: 0.41 K/UL (ref 1.5–4)
LYMPHOCYTES RELATIVE PERCENT: 10 % (ref 20–42)
MCH RBC QN AUTO: 26.2 PG (ref 26–35)
MCHC RBC AUTO-ENTMCNC: 31 G/DL (ref 32–34.5)
MCV RBC AUTO: 84.6 FL (ref 80–99.9)
MONOCYTES NFR BLD: 0.3 K/UL (ref 0.1–0.95)
MONOCYTES NFR BLD: 7 % (ref 2–12)
NEUTROPHILS NFR BLD: 83 % (ref 43–80)
NEUTS SEG NFR BLD: 3.55 K/UL (ref 1.8–7.3)
NUCLEATED RED BLOOD CELLS: 1 PER 100 WBC
PLATELET, FLUORESCENCE: 119 K/UL (ref 130–450)
PMV BLD AUTO: 12.5 FL (ref 7–12)
POTASSIUM SERPL-SCNC: 4.2 MMOL/L (ref 3.5–5)
PROT SERPL-MCNC: 6.2 G/DL (ref 6.4–8.3)
RBC # BLD AUTO: 4.16 M/UL (ref 3.5–5.5)
RBC # BLD: ABNORMAL 10*6/UL
SODIUM SERPL-SCNC: 142 MMOL/L (ref 132–146)
WBC OTHER # BLD: 4.3 K/UL (ref 4.5–11.5)

## 2024-04-05 PROCEDURE — 85025 COMPLETE CBC W/AUTO DIFF WBC: CPT

## 2024-04-05 PROCEDURE — 36415 COLL VENOUS BLD VENIPUNCTURE: CPT

## 2024-04-05 PROCEDURE — 6360000002 HC RX W HCPCS

## 2024-04-05 PROCEDURE — 2060000000 HC ICU INTERMEDIATE R&B

## 2024-04-05 PROCEDURE — 6370000000 HC RX 637 (ALT 250 FOR IP)

## 2024-04-05 PROCEDURE — 80053 COMPREHEN METABOLIC PANEL: CPT

## 2024-04-05 PROCEDURE — 94640 AIRWAY INHALATION TREATMENT: CPT

## 2024-04-05 PROCEDURE — 93005 ELECTROCARDIOGRAM TRACING: CPT

## 2024-04-05 PROCEDURE — 2700000000 HC OXYGEN THERAPY PER DAY

## 2024-04-05 PROCEDURE — 93010 ELECTROCARDIOGRAM REPORT: CPT | Performed by: INTERNAL MEDICINE

## 2024-04-05 PROCEDURE — 2580000003 HC RX 258

## 2024-04-05 PROCEDURE — 94660 CPAP INITIATION&MGMT: CPT

## 2024-04-05 PROCEDURE — 99232 SBSQ HOSP IP/OBS MODERATE 35: CPT | Performed by: FAMILY MEDICINE

## 2024-04-05 RX ORDER — PREDNISONE 20 MG/1
40 TABLET ORAL DAILY
Status: DISCONTINUED | OUTPATIENT
Start: 2024-04-05 | End: 2024-04-05

## 2024-04-05 RX ADMIN — SODIUM CHLORIDE, PRESERVATIVE FREE 10 ML: 5 INJECTION INTRAVENOUS at 08:24

## 2024-04-05 RX ADMIN — SODIUM CHLORIDE, PRESERVATIVE FREE 10 ML: 5 INJECTION INTRAVENOUS at 20:15

## 2024-04-05 RX ADMIN — MULTIVITAMIN TABLET 1 TABLET: TABLET at 08:18

## 2024-04-05 RX ADMIN — GUAIFENESIN 400 MG: 400 TABLET ORAL at 11:31

## 2024-04-05 RX ADMIN — PREDNISONE 40 MG: 20 TABLET ORAL at 16:01

## 2024-04-05 RX ADMIN — AMLODIPINE BESYLATE 5 MG: 5 TABLET ORAL at 08:16

## 2024-04-05 RX ADMIN — GUAIFENESIN 400 MG: 400 TABLET ORAL at 16:00

## 2024-04-05 RX ADMIN — ARFORMOTEROL TARTRATE 15 MCG: 15 SOLUTION RESPIRATORY (INHALATION) at 21:04

## 2024-04-05 RX ADMIN — DESMOPRESSIN ACETATE 200 MCG: 0.1 TABLET ORAL at 20:15

## 2024-04-05 RX ADMIN — FAMOTIDINE 20 MG: 20 TABLET, FILM COATED ORAL at 08:17

## 2024-04-05 RX ADMIN — LEVETIRACETAM 500 MG: 500 TABLET, FILM COATED ORAL at 08:17

## 2024-04-05 RX ADMIN — APIXABAN 5 MG: 5 TABLET, FILM COATED ORAL at 20:14

## 2024-04-05 RX ADMIN — WATER 40 MG: 1 INJECTION INTRAMUSCULAR; INTRAVENOUS; SUBCUTANEOUS at 11:31

## 2024-04-05 RX ADMIN — GUAIFENESIN 400 MG: 400 TABLET ORAL at 08:17

## 2024-04-05 RX ADMIN — HYDROCORTISONE 5 MG: 5 TABLET ORAL at 20:15

## 2024-04-05 RX ADMIN — DULOXETINE HYDROCHLORIDE 60 MG: 60 CAPSULE, DELAYED RELEASE ORAL at 08:17

## 2024-04-05 RX ADMIN — CIPROFLOXACIN HYDROCHLORIDE 500 MG: 500 TABLET, FILM COATED ORAL at 20:14

## 2024-04-05 RX ADMIN — POTASSIUM CHLORIDE 20 MEQ: 1500 TABLET, EXTENDED RELEASE ORAL at 08:17

## 2024-04-05 RX ADMIN — IPRATROPIUM BROMIDE AND ALBUTEROL SULFATE 1 DOSE: 2.5; .5 SOLUTION RESPIRATORY (INHALATION) at 08:38

## 2024-04-05 RX ADMIN — TIZANIDINE 2 MG: 4 TABLET ORAL at 16:01

## 2024-04-05 RX ADMIN — LEVOTHYROXINE SODIUM 50 MCG: 0.05 TABLET ORAL at 06:17

## 2024-04-05 RX ADMIN — TIZANIDINE 2 MG: 4 TABLET ORAL at 08:23

## 2024-04-05 RX ADMIN — ACETAMINOPHEN 650 MG: 325 TABLET ORAL at 08:23

## 2024-04-05 RX ADMIN — FERROUS SULFATE TAB 325 MG (65 MG ELEMENTAL FE) 325 MG: 325 (65 FE) TAB at 08:16

## 2024-04-05 RX ADMIN — ARFORMOTEROL TARTRATE 15 MCG: 15 SOLUTION RESPIRATORY (INHALATION) at 08:38

## 2024-04-05 RX ADMIN — ACETAMINOPHEN 650 MG: 325 TABLET ORAL at 16:01

## 2024-04-05 RX ADMIN — FERROUS SULFATE TAB 325 MG (65 MG ELEMENTAL FE) 325 MG: 325 (65 FE) TAB at 20:15

## 2024-04-05 RX ADMIN — BUDESONIDE INHALATION 500 MCG: 0.5 SUSPENSION RESPIRATORY (INHALATION) at 08:38

## 2024-04-05 RX ADMIN — Medication 1000 UNITS: at 08:16

## 2024-04-05 RX ADMIN — BUDESONIDE INHALATION 500 MCG: 0.5 SUSPENSION RESPIRATORY (INHALATION) at 21:04

## 2024-04-05 RX ADMIN — OMEGA-3-ACID ETHYL ESTERS 2 G: 1 CAPSULE, LIQUID FILLED ORAL at 20:14

## 2024-04-05 RX ADMIN — IPRATROPIUM BROMIDE AND ALBUTEROL SULFATE 1 DOSE: 2.5; .5 SOLUTION RESPIRATORY (INHALATION) at 21:04

## 2024-04-05 RX ADMIN — FUROSEMIDE 20 MG: 20 TABLET ORAL at 08:16

## 2024-04-05 RX ADMIN — CIPROFLOXACIN HYDROCHLORIDE 500 MG: 500 TABLET, FILM COATED ORAL at 08:17

## 2024-04-05 RX ADMIN — IPRATROPIUM BROMIDE AND ALBUTEROL SULFATE 1 DOSE: 2.5; .5 SOLUTION RESPIRATORY (INHALATION) at 12:45

## 2024-04-05 RX ADMIN — APIXABAN 5 MG: 5 TABLET, FILM COATED ORAL at 08:16

## 2024-04-05 RX ADMIN — WATER 40 MG: 1 INJECTION INTRAMUSCULAR; INTRAVENOUS; SUBCUTANEOUS at 01:54

## 2024-04-05 RX ADMIN — IPRATROPIUM BROMIDE AND ALBUTEROL SULFATE 1 DOSE: 2.5; .5 SOLUTION RESPIRATORY (INHALATION) at 16:12

## 2024-04-05 RX ADMIN — LEVETIRACETAM 500 MG: 500 TABLET, FILM COATED ORAL at 20:14

## 2024-04-05 RX ADMIN — DESMOPRESSIN ACETATE 200 MCG: 0.1 TABLET ORAL at 08:16

## 2024-04-05 RX ADMIN — METOPROLOL SUCCINATE 50 MG: 50 TABLET, FILM COATED, EXTENDED RELEASE ORAL at 08:16

## 2024-04-05 RX ADMIN — OMEGA-3-ACID ETHYL ESTERS 2 G: 1 CAPSULE, LIQUID FILLED ORAL at 08:17

## 2024-04-05 ASSESSMENT — PAIN DESCRIPTION - LOCATION
LOCATION: HEAD
LOCATION: SHOULDER

## 2024-04-05 ASSESSMENT — PAIN DESCRIPTION - DESCRIPTORS
DESCRIPTORS: ACHING
DESCRIPTORS: ACHING

## 2024-04-05 ASSESSMENT — PAIN SCALES - GENERAL
PAINLEVEL_OUTOF10: 4
PAINLEVEL_OUTOF10: 4

## 2024-04-05 ASSESSMENT — PAIN DESCRIPTION - ORIENTATION: ORIENTATION: RIGHT

## 2024-04-05 NOTE — ACP (ADVANCE CARE PLANNING)
Advance Care Planning   Healthcare Decision Maker:    Primary Decision Maker: Jennifer,Reyes Finney - Spouse - [phone]    Secondary Decision Maker: ELIER AVILES - Child - [phone]    Click here to complete Healthcare Decision Makers including selection of the Healthcare Decision Maker Relationship (ie \"Primary\").

## 2024-04-05 NOTE — CARE COORDINATION
Transition of Care: Met with pt at bedside to discuss transition of care. They live in a 1 story home with ramp entrance. Patient has oxygen and bipap at home through [employer]. She also has walker, wheelchair, BSC, and shower bench.Spoke with spouse, he plans for patient to return home when released. Tells me absolutely no nursing facility. [facility] active prior to admission, they are following, will need resume homecare orders prior to discharge. Discussed possible need for ambulance at discharge, spouse tells me he will transport home, has portable tank to get home home. Pt admitted with COPD exacerbation. Pulm consulted. IV Solu Medrol 40 mg q 12. IS with flutter valve. PO Lasix daily. CM/SW to follow (TF)      JAVIER Bond,RN  Case Management  [phone]        Case Management Assessment  Initial Evaluation    Date/Time of Evaluation: 4/5/2024 3:47 PM  Assessment Completed by: JESSICA GARCIA RN    If patient is discharged prior to next notation, then this note serves as note for discharge by case management.    Patient Name: Rebekah Rodriguez                   YOB: 1955  Diagnosis: COPD exacerbation (HCC) [J44.1]  Pneumonia due to infectious organism [J18.9]  Chest pain, unspecified type [R07.9]  Pneumonia due to infectious organism, unspecified laterality, unspecified part of lung [J18.9]                   Date / Time: 4/3/2024  6:14 PM    Patient Admission Status: Inpatient   Readmission Risk (Low < 19, Mod (19-27), High > 27): Readmission Risk Score: 36.9    Current PCP: Lauro Ellington MD  PCP verified by ? Yes    Chart Reviewed: Yes      History Provided by: Patient  Patient Orientation: Alert and Oriented    Patient Cognition: Alert    Hospitalization in the last 30 days (Readmission):  No    If yes, Readmission Assessment in  Navigator will be completed.    Advance Directives:      Code Status: Full Code   Patient's Primary Decision Maker is: Legal Next of Kin    Primary

## 2024-04-06 LAB
ALBUMIN SERPL-MCNC: 4.1 G/DL (ref 3.5–5.2)
ALP SERPL-CCNC: 93 U/L (ref 35–104)
ALT SERPL-CCNC: 23 U/L (ref 0–32)
ANION GAP SERPL CALCULATED.3IONS-SCNC: 11 MMOL/L (ref 7–16)
ANION GAP SERPL CALCULATED.3IONS-SCNC: 12 MMOL/L (ref 7–16)
AST SERPL-CCNC: 25 U/L (ref 0–31)
BASOPHILS # BLD: 0 K/UL (ref 0–0.2)
BASOPHILS NFR BLD: 0 % (ref 0–2)
BILIRUB SERPL-MCNC: 0.2 MG/DL (ref 0–1.2)
BUN SERPL-MCNC: 31 MG/DL (ref 6–23)
BUN SERPL-MCNC: 31 MG/DL (ref 6–23)
CALCIUM SERPL-MCNC: 9.2 MG/DL (ref 8.6–10.2)
CALCIUM SERPL-MCNC: 9.6 MG/DL (ref 8.6–10.2)
CHLORIDE SERPL-SCNC: 102 MMOL/L (ref 98–107)
CHLORIDE SERPL-SCNC: 102 MMOL/L (ref 98–107)
CO2 SERPL-SCNC: 27 MMOL/L (ref 22–29)
CO2 SERPL-SCNC: 28 MMOL/L (ref 22–29)
CREAT SERPL-MCNC: 1.1 MG/DL (ref 0.5–1)
CREAT SERPL-MCNC: 1.1 MG/DL (ref 0.5–1)
EOSINOPHIL # BLD: 0 K/UL (ref 0.05–0.5)
EOSINOPHILS RELATIVE PERCENT: 0 % (ref 0–6)
ERYTHROCYTE [DISTWIDTH] IN BLOOD BY AUTOMATED COUNT: 15.4 % (ref 11.5–15)
GFR SERPL CREATININE-BSD FRML MDRD: 56 ML/MIN/1.73M2
GFR SERPL CREATININE-BSD FRML MDRD: 57 ML/MIN/1.73M2
GLUCOSE SERPL-MCNC: 105 MG/DL (ref 74–99)
GLUCOSE SERPL-MCNC: 79 MG/DL (ref 74–99)
HCT VFR BLD AUTO: 38 % (ref 34–48)
HGB BLD-MCNC: 11.6 G/DL (ref 11.5–15.5)
LYMPHOCYTES NFR BLD: 0.19 K/UL (ref 1.5–4)
LYMPHOCYTES RELATIVE PERCENT: 4 % (ref 20–42)
MCH RBC QN AUTO: 26.1 PG (ref 26–35)
MCHC RBC AUTO-ENTMCNC: 30.5 G/DL (ref 32–34.5)
MCV RBC AUTO: 85.4 FL (ref 80–99.9)
MONOCYTES NFR BLD: 0.04 K/UL (ref 0.1–0.95)
MONOCYTES NFR BLD: 1 % (ref 2–12)
NEUTROPHILS NFR BLD: 95 % (ref 43–80)
NEUTS SEG NFR BLD: 4.17 K/UL (ref 1.8–7.3)
PLATELET, FLUORESCENCE: 123 K/UL (ref 130–450)
PMV BLD AUTO: 11.8 FL (ref 7–12)
POTASSIUM SERPL-SCNC: 4.1 MMOL/L (ref 3.5–5)
POTASSIUM SERPL-SCNC: 5.3 MMOL/L (ref 3.5–5)
PROT SERPL-MCNC: 6.6 G/DL (ref 6.4–8.3)
RBC # BLD AUTO: 4.45 M/UL (ref 3.5–5.5)
RBC # BLD: ABNORMAL 10*6/UL
SODIUM SERPL-SCNC: 140 MMOL/L (ref 132–146)
SODIUM SERPL-SCNC: 142 MMOL/L (ref 132–146)
WBC OTHER # BLD: 4.4 K/UL (ref 4.5–11.5)

## 2024-04-06 PROCEDURE — 94640 AIRWAY INHALATION TREATMENT: CPT

## 2024-04-06 PROCEDURE — 80053 COMPREHEN METABOLIC PANEL: CPT

## 2024-04-06 PROCEDURE — 94660 CPAP INITIATION&MGMT: CPT

## 2024-04-06 PROCEDURE — 85025 COMPLETE CBC W/AUTO DIFF WBC: CPT

## 2024-04-06 PROCEDURE — 99231 SBSQ HOSP IP/OBS SF/LOW 25: CPT | Performed by: FAMILY MEDICINE

## 2024-04-06 PROCEDURE — 6360000002 HC RX W HCPCS

## 2024-04-06 PROCEDURE — 6370000000 HC RX 637 (ALT 250 FOR IP)

## 2024-04-06 PROCEDURE — 36415 COLL VENOUS BLD VENIPUNCTURE: CPT

## 2024-04-06 PROCEDURE — 2700000000 HC OXYGEN THERAPY PER DAY

## 2024-04-06 PROCEDURE — 80048 BASIC METABOLIC PNL TOTAL CA: CPT

## 2024-04-06 PROCEDURE — 2580000003 HC RX 258

## 2024-04-06 PROCEDURE — 2060000000 HC ICU INTERMEDIATE R&B

## 2024-04-06 RX ORDER — CYCLOBENZAPRINE HCL 10 MG
10 TABLET ORAL ONCE
Status: COMPLETED | OUTPATIENT
Start: 2024-04-06 | End: 2024-04-06

## 2024-04-06 RX ADMIN — OMEGA-3-ACID ETHYL ESTERS 2 G: 1 CAPSULE, LIQUID FILLED ORAL at 09:36

## 2024-04-06 RX ADMIN — GUAIFENESIN 400 MG: 400 TABLET ORAL at 13:13

## 2024-04-06 RX ADMIN — FERROUS SULFATE TAB 325 MG (65 MG ELEMENTAL FE) 325 MG: 325 (65 FE) TAB at 09:34

## 2024-04-06 RX ADMIN — TIZANIDINE 2 MG: 4 TABLET ORAL at 20:47

## 2024-04-06 RX ADMIN — AMLODIPINE BESYLATE 5 MG: 5 TABLET ORAL at 09:34

## 2024-04-06 RX ADMIN — APIXABAN 5 MG: 5 TABLET, FILM COATED ORAL at 20:41

## 2024-04-06 RX ADMIN — CIPROFLOXACIN HYDROCHLORIDE 500 MG: 500 TABLET, FILM COATED ORAL at 20:42

## 2024-04-06 RX ADMIN — Medication 1000 UNITS: at 09:35

## 2024-04-06 RX ADMIN — GUAIFENESIN 400 MG: 400 TABLET ORAL at 09:35

## 2024-04-06 RX ADMIN — DESMOPRESSIN ACETATE 200 MCG: 0.1 TABLET ORAL at 09:35

## 2024-04-06 RX ADMIN — ACETAMINOPHEN 650 MG: 325 TABLET ORAL at 20:50

## 2024-04-06 RX ADMIN — METOPROLOL SUCCINATE 50 MG: 50 TABLET, FILM COATED, EXTENDED RELEASE ORAL at 20:41

## 2024-04-06 RX ADMIN — FUROSEMIDE 20 MG: 20 TABLET ORAL at 09:34

## 2024-04-06 RX ADMIN — OMEGA-3-ACID ETHYL ESTERS 2 G: 1 CAPSULE, LIQUID FILLED ORAL at 20:42

## 2024-04-06 RX ADMIN — SILDENAFIL 10 MG: 20 TABLET, FILM COATED ORAL at 20:41

## 2024-04-06 RX ADMIN — CIPROFLOXACIN HYDROCHLORIDE 500 MG: 500 TABLET, FILM COATED ORAL at 09:37

## 2024-04-06 RX ADMIN — DIGOXIN 62.5 MCG: 125 TABLET ORAL at 09:34

## 2024-04-06 RX ADMIN — METOPROLOL SUCCINATE 50 MG: 50 TABLET, FILM COATED, EXTENDED RELEASE ORAL at 09:35

## 2024-04-06 RX ADMIN — GUAIFENESIN 400 MG: 400 TABLET ORAL at 18:09

## 2024-04-06 RX ADMIN — IPRATROPIUM BROMIDE AND ALBUTEROL SULFATE 1 DOSE: 2.5; .5 SOLUTION RESPIRATORY (INHALATION) at 15:38

## 2024-04-06 RX ADMIN — HYDROCORTISONE 5 MG: 5 TABLET ORAL at 20:42

## 2024-04-06 RX ADMIN — SODIUM CHLORIDE, PRESERVATIVE FREE 10 ML: 5 INJECTION INTRAVENOUS at 20:41

## 2024-04-06 RX ADMIN — APIXABAN 5 MG: 5 TABLET, FILM COATED ORAL at 09:35

## 2024-04-06 RX ADMIN — DULOXETINE HYDROCHLORIDE 60 MG: 60 CAPSULE, DELAYED RELEASE ORAL at 09:35

## 2024-04-06 RX ADMIN — TIZANIDINE 2 MG: 4 TABLET ORAL at 09:33

## 2024-04-06 RX ADMIN — SILDENAFIL 10 MG: 20 TABLET, FILM COATED ORAL at 16:11

## 2024-04-06 RX ADMIN — ARFORMOTEROL TARTRATE 15 MCG: 15 SOLUTION RESPIRATORY (INHALATION) at 06:34

## 2024-04-06 RX ADMIN — SODIUM CHLORIDE, PRESERVATIVE FREE 10 ML: 5 INJECTION INTRAVENOUS at 09:37

## 2024-04-06 RX ADMIN — LEVETIRACETAM 500 MG: 500 TABLET, FILM COATED ORAL at 20:41

## 2024-04-06 RX ADMIN — LEVETIRACETAM 500 MG: 500 TABLET, FILM COATED ORAL at 09:35

## 2024-04-06 RX ADMIN — BUDESONIDE INHALATION 500 MCG: 0.5 SUSPENSION RESPIRATORY (INHALATION) at 06:34

## 2024-04-06 RX ADMIN — FAMOTIDINE 20 MG: 20 TABLET, FILM COATED ORAL at 09:34

## 2024-04-06 RX ADMIN — SILDENAFIL 10 MG: 20 TABLET, FILM COATED ORAL at 09:36

## 2024-04-06 RX ADMIN — IPRATROPIUM BROMIDE AND ALBUTEROL SULFATE 1 DOSE: 2.5; .5 SOLUTION RESPIRATORY (INHALATION) at 11:58

## 2024-04-06 RX ADMIN — LEVOTHYROXINE SODIUM 50 MCG: 0.05 TABLET ORAL at 05:16

## 2024-04-06 RX ADMIN — CYCLOBENZAPRINE HYDROCHLORIDE 10 MG: 10 TABLET, FILM COATED ORAL at 13:13

## 2024-04-06 RX ADMIN — IPRATROPIUM BROMIDE AND ALBUTEROL SULFATE 1 DOSE: 2.5; .5 SOLUTION RESPIRATORY (INHALATION) at 06:34

## 2024-04-06 RX ADMIN — FERROUS SULFATE TAB 325 MG (65 MG ELEMENTAL FE) 325 MG: 325 (65 FE) TAB at 20:41

## 2024-04-06 RX ADMIN — DESMOPRESSIN ACETATE 200 MCG: 0.1 TABLET ORAL at 20:41

## 2024-04-06 ASSESSMENT — PAIN DESCRIPTION - LOCATION
LOCATION: GENERALIZED
LOCATION: CHEST;HEAD
LOCATION: RIB CAGE

## 2024-04-06 ASSESSMENT — PAIN - FUNCTIONAL ASSESSMENT: PAIN_FUNCTIONAL_ASSESSMENT: ACTIVITIES ARE NOT PREVENTED

## 2024-04-06 ASSESSMENT — PAIN DESCRIPTION - DESCRIPTORS
DESCRIPTORS: ACHING;POUNDING;THROBBING
DESCRIPTORS: ACHING;CRUSHING;DISCOMFORT
DESCRIPTORS: ACHING;DISCOMFORT

## 2024-04-06 ASSESSMENT — PAIN SCALES - GENERAL
PAINLEVEL_OUTOF10: 10
PAINLEVEL_OUTOF10: 4
PAINLEVEL_OUTOF10: 8

## 2024-04-06 ASSESSMENT — PAIN SCALES - WONG BAKER: WONGBAKER_NUMERICALRESPONSE: NO HURT

## 2024-04-07 LAB
ACB COMPLEX DNA BLD POS QL NAA+NON-PROBE: NOT DETECTED
ALBUMIN SERPL-MCNC: 3.4 G/DL (ref 3.5–5.2)
ALP SERPL-CCNC: 83 U/L (ref 35–104)
ALT SERPL-CCNC: 26 U/L (ref 0–32)
ANION GAP SERPL CALCULATED.3IONS-SCNC: 20 MMOL/L (ref 7–16)
AST SERPL-CCNC: 30 U/L (ref 0–31)
B FRAGILIS DNA BLD POS QL NAA+NON-PROBE: NOT DETECTED
BASOPHILS # BLD: 0.03 K/UL (ref 0–0.2)
BASOPHILS NFR BLD: 1 % (ref 0–2)
BILIRUB SERPL-MCNC: 0.2 MG/DL (ref 0–1.2)
BIOFIRE TEST COMMENT: ABNORMAL
BUN SERPL-MCNC: 27 MG/DL (ref 6–23)
C ALBICANS DNA BLD POS QL NAA+NON-PROBE: NOT DETECTED
C AURIS DNA BLD POS QL NAA+NON-PROBE: NOT DETECTED
C GATTII+NEOFOR DNA BLD POS QL NAA+N-PRB: NOT DETECTED
C GLABRATA DNA BLD POS QL NAA+NON-PROBE: NOT DETECTED
C KRUSEI DNA BLD POS QL NAA+NON-PROBE: NOT DETECTED
C PARAP DNA BLD POS QL NAA+NON-PROBE: NOT DETECTED
C TROPICLS DNA BLD POS QL NAA+NON-PROBE: NOT DETECTED
CALCIUM SERPL-MCNC: 9.1 MG/DL (ref 8.6–10.2)
CHLORIDE SERPL-SCNC: 101 MMOL/L (ref 98–107)
CO2 SERPL-SCNC: 17 MMOL/L (ref 22–29)
CREAT SERPL-MCNC: 1 MG/DL (ref 0.5–1)
E CLOAC COMP DNA BLD POS NAA+NON-PROBE: NOT DETECTED
E COLI DNA BLD POS QL NAA+NON-PROBE: NOT DETECTED
E FAECALIS DNA BLD POS QL NAA+NON-PROBE: NOT DETECTED
E FAECIUM DNA BLD POS QL NAA+NON-PROBE: NOT DETECTED
ENTEROBACTERALES DNA BLD POS NAA+N-PRB: NOT DETECTED
EOSINOPHIL # BLD: 0.19 K/UL (ref 0.05–0.5)
EOSINOPHILS RELATIVE PERCENT: 4 % (ref 0–6)
ERYTHROCYTE [DISTWIDTH] IN BLOOD BY AUTOMATED COUNT: 15.5 % (ref 11.5–15)
GFR SERPL CREATININE-BSD FRML MDRD: 63 ML/MIN/1.73M2
GLUCOSE SERPL-MCNC: 68 MG/DL (ref 74–99)
GP B STREP DNA BLD POS QL NAA+NON-PROBE: NOT DETECTED
HAEM INFLU DNA BLD POS QL NAA+NON-PROBE: NOT DETECTED
HCT VFR BLD AUTO: 36.6 % (ref 34–48)
HGB BLD-MCNC: 11.5 G/DL (ref 11.5–15.5)
IMM GRANULOCYTES # BLD AUTO: <0.03 K/UL (ref 0–0.58)
IMM GRANULOCYTES NFR BLD: 1 % (ref 0–5)
K OXYTOCA DNA BLD POS QL NAA+NON-PROBE: NOT DETECTED
KLEBSIELLA SP DNA BLD POS QL NAA+NON-PRB: NOT DETECTED
KLEBSIELLA SP DNA BLD POS QL NAA+NON-PRB: NOT DETECTED
L MONOCYTOG DNA BLD POS QL NAA+NON-PROBE: NOT DETECTED
LYMPHOCYTES NFR BLD: 0.63 K/UL (ref 1.5–4)
LYMPHOCYTES RELATIVE PERCENT: 14 % (ref 20–42)
MCH RBC QN AUTO: 26.3 PG (ref 26–35)
MCHC RBC AUTO-ENTMCNC: 31.4 G/DL (ref 32–34.5)
MCV RBC AUTO: 83.8 FL (ref 80–99.9)
MECA+MECC ISLT/SPM QL: DETECTED
MICROORGANISM SPEC CULT: ABNORMAL
MICROORGANISM/AGENT SPEC: ABNORMAL
MONOCYTES NFR BLD: 0.58 K/UL (ref 0.1–0.95)
MONOCYTES NFR BLD: 13 % (ref 2–12)
N MEN DNA BLD POS QL NAA+NON-PROBE: NOT DETECTED
NEUTROPHILS NFR BLD: 67 % (ref 43–80)
NEUTS SEG NFR BLD: 2.94 K/UL (ref 1.8–7.3)
P AERUGINOSA DNA BLD POS NAA+NON-PROBE: NOT DETECTED
PLATELET, FLUORESCENCE: 121 K/UL (ref 130–450)
PMV BLD AUTO: 10.7 FL (ref 7–12)
POTASSIUM SERPL-SCNC: 4.5 MMOL/L (ref 3.5–5)
PROT SERPL-MCNC: 6 G/DL (ref 6.4–8.3)
PROTEUS SP DNA BLD POS QL NAA+NON-PROBE: NOT DETECTED
RBC # BLD AUTO: 4.37 M/UL (ref 3.5–5.5)
S AUREUS DNA BLD POS QL NAA+NON-PROBE: NOT DETECTED
S AUREUS+CONS DNA BLD POS NAA+NON-PROBE: DETECTED
S EPIDERMIDIS DNA BLD POS QL NAA+NON-PRB: DETECTED
S LUGDUNENSIS DNA BLD POS QL NAA+NON-PRB: NOT DETECTED
S MALTOPHILIA DNA BLD POS QL NAA+NON-PRB: NOT DETECTED
S MARCESCENS DNA BLD POS NAA+NON-PROBE: NOT DETECTED
S PNEUM DNA BLD POS QL NAA+NON-PROBE: NOT DETECTED
S PYO DNA BLD POS QL NAA+NON-PROBE: NOT DETECTED
SALMONELLA DNA BLD POS QL NAA+NON-PROBE: NOT DETECTED
SERVICE CMNT-IMP: ABNORMAL
SODIUM SERPL-SCNC: 138 MMOL/L (ref 132–146)
SPECIMEN DESCRIPTION: ABNORMAL
STREPTOCOCCUS DNA BLD POS NAA+NON-PROBE: NOT DETECTED
WBC OTHER # BLD: 4.4 K/UL (ref 4.5–11.5)

## 2024-04-07 PROCEDURE — 85025 COMPLETE CBC W/AUTO DIFF WBC: CPT

## 2024-04-07 PROCEDURE — 2700000000 HC OXYGEN THERAPY PER DAY

## 2024-04-07 PROCEDURE — 6370000000 HC RX 637 (ALT 250 FOR IP)

## 2024-04-07 PROCEDURE — 6360000002 HC RX W HCPCS

## 2024-04-07 PROCEDURE — 2580000003 HC RX 258

## 2024-04-07 PROCEDURE — 94660 CPAP INITIATION&MGMT: CPT

## 2024-04-07 PROCEDURE — 36415 COLL VENOUS BLD VENIPUNCTURE: CPT

## 2024-04-07 PROCEDURE — 99232 SBSQ HOSP IP/OBS MODERATE 35: CPT | Performed by: FAMILY MEDICINE

## 2024-04-07 PROCEDURE — 2060000000 HC ICU INTERMEDIATE R&B

## 2024-04-07 PROCEDURE — 94640 AIRWAY INHALATION TREATMENT: CPT

## 2024-04-07 PROCEDURE — 87040 BLOOD CULTURE FOR BACTERIA: CPT

## 2024-04-07 PROCEDURE — 80053 COMPREHEN METABOLIC PANEL: CPT

## 2024-04-07 RX ORDER — CYCLOBENZAPRINE HCL 10 MG
10 TABLET ORAL 3 TIMES DAILY PRN
Status: DISCONTINUED | OUTPATIENT
Start: 2024-04-07 | End: 2024-04-08 | Stop reason: HOSPADM

## 2024-04-07 RX ORDER — METOPROLOL SUCCINATE 50 MG/1
50 TABLET, EXTENDED RELEASE ORAL 2 TIMES DAILY
Status: DISCONTINUED | OUTPATIENT
Start: 2024-04-07 | End: 2024-04-08 | Stop reason: HOSPADM

## 2024-04-07 RX ORDER — METOPROLOL SUCCINATE 25 MG/1
25 TABLET, EXTENDED RELEASE ORAL 2 TIMES DAILY
Status: DISCONTINUED | OUTPATIENT
Start: 2024-04-07 | End: 2024-04-07

## 2024-04-07 RX ADMIN — TIZANIDINE 2 MG: 4 TABLET ORAL at 08:50

## 2024-04-07 RX ADMIN — Medication 1000 UNITS: at 08:51

## 2024-04-07 RX ADMIN — GUAIFENESIN 400 MG: 400 TABLET ORAL at 08:51

## 2024-04-07 RX ADMIN — AMLODIPINE BESYLATE 5 MG: 5 TABLET ORAL at 08:51

## 2024-04-07 RX ADMIN — SILDENAFIL 10 MG: 20 TABLET, FILM COATED ORAL at 20:30

## 2024-04-07 RX ADMIN — ACETAMINOPHEN 650 MG: 325 TABLET ORAL at 20:23

## 2024-04-07 RX ADMIN — METOPROLOL SUCCINATE 50 MG: 50 TABLET, FILM COATED, EXTENDED RELEASE ORAL at 08:51

## 2024-04-07 RX ADMIN — SILDENAFIL 10 MG: 20 TABLET, FILM COATED ORAL at 13:31

## 2024-04-07 RX ADMIN — CYCLOBENZAPRINE HYDROCHLORIDE 10 MG: 10 TABLET, FILM COATED ORAL at 17:29

## 2024-04-07 RX ADMIN — BUDESONIDE INHALATION 500 MCG: 0.5 SUSPENSION RESPIRATORY (INHALATION) at 06:06

## 2024-04-07 RX ADMIN — FERROUS SULFATE TAB 325 MG (65 MG ELEMENTAL FE) 325 MG: 325 (65 FE) TAB at 20:24

## 2024-04-07 RX ADMIN — LEVETIRACETAM 500 MG: 500 TABLET, FILM COATED ORAL at 08:51

## 2024-04-07 RX ADMIN — FERROUS SULFATE TAB 325 MG (65 MG ELEMENTAL FE) 325 MG: 325 (65 FE) TAB at 08:50

## 2024-04-07 RX ADMIN — DESMOPRESSIN ACETATE 200 MCG: 0.1 TABLET ORAL at 20:25

## 2024-04-07 RX ADMIN — OMEGA-3-ACID ETHYL ESTERS 2 G: 1 CAPSULE, LIQUID FILLED ORAL at 20:24

## 2024-04-07 RX ADMIN — GUAIFENESIN 400 MG: 400 TABLET ORAL at 17:27

## 2024-04-07 RX ADMIN — IPRATROPIUM BROMIDE AND ALBUTEROL SULFATE 1 DOSE: 2.5; .5 SOLUTION RESPIRATORY (INHALATION) at 06:06

## 2024-04-07 RX ADMIN — DULOXETINE HYDROCHLORIDE 60 MG: 60 CAPSULE, DELAYED RELEASE ORAL at 08:50

## 2024-04-07 RX ADMIN — ACETAMINOPHEN 650 MG: 325 TABLET ORAL at 13:31

## 2024-04-07 RX ADMIN — METOPROLOL SUCCINATE 50 MG: 50 TABLET, EXTENDED RELEASE ORAL at 20:30

## 2024-04-07 RX ADMIN — LEVETIRACETAM 500 MG: 500 TABLET, FILM COATED ORAL at 20:24

## 2024-04-07 RX ADMIN — FUROSEMIDE 20 MG: 20 TABLET ORAL at 08:51

## 2024-04-07 RX ADMIN — GUAIFENESIN 400 MG: 400 TABLET ORAL at 13:31

## 2024-04-07 RX ADMIN — HYDROCORTISONE 5 MG: 5 TABLET ORAL at 20:23

## 2024-04-07 RX ADMIN — ARFORMOTEROL TARTRATE 15 MCG: 15 SOLUTION RESPIRATORY (INHALATION) at 06:06

## 2024-04-07 RX ADMIN — CIPROFLOXACIN HYDROCHLORIDE 500 MG: 500 TABLET, FILM COATED ORAL at 20:23

## 2024-04-07 RX ADMIN — CIPROFLOXACIN HYDROCHLORIDE 500 MG: 500 TABLET, FILM COATED ORAL at 08:53

## 2024-04-07 RX ADMIN — MULTIVITAMIN TABLET 1 TABLET: TABLET at 08:52

## 2024-04-07 RX ADMIN — SILDENAFIL 10 MG: 20 TABLET, FILM COATED ORAL at 08:52

## 2024-04-07 RX ADMIN — SODIUM CHLORIDE, PRESERVATIVE FREE 10 ML: 5 INJECTION INTRAVENOUS at 08:53

## 2024-04-07 RX ADMIN — OMEGA-3-ACID ETHYL ESTERS 2 G: 1 CAPSULE, LIQUID FILLED ORAL at 08:52

## 2024-04-07 RX ADMIN — DIGOXIN 62.5 MCG: 125 TABLET ORAL at 08:51

## 2024-04-07 RX ADMIN — APIXABAN 5 MG: 5 TABLET, FILM COATED ORAL at 08:51

## 2024-04-07 RX ADMIN — DESMOPRESSIN ACETATE 200 MCG: 0.1 TABLET ORAL at 08:51

## 2024-04-07 RX ADMIN — FAMOTIDINE 20 MG: 20 TABLET, FILM COATED ORAL at 08:50

## 2024-04-07 RX ADMIN — IPRATROPIUM BROMIDE AND ALBUTEROL SULFATE 1 DOSE: 2.5; .5 SOLUTION RESPIRATORY (INHALATION) at 15:44

## 2024-04-07 RX ADMIN — APIXABAN 5 MG: 5 TABLET, FILM COATED ORAL at 20:23

## 2024-04-07 ASSESSMENT — PAIN SCALES - GENERAL
PAINLEVEL_OUTOF10: 7
PAINLEVEL_OUTOF10: 9
PAINLEVEL_OUTOF10: 8
PAINLEVEL_OUTOF10: 8

## 2024-04-07 ASSESSMENT — PAIN DESCRIPTION - ORIENTATION: ORIENTATION: OTHER (COMMENT)

## 2024-04-07 ASSESSMENT — PAIN - FUNCTIONAL ASSESSMENT: PAIN_FUNCTIONAL_ASSESSMENT: ACTIVITIES ARE NOT PREVENTED

## 2024-04-07 ASSESSMENT — PAIN DESCRIPTION - DESCRIPTORS: DESCRIPTORS: ACHING

## 2024-04-07 ASSESSMENT — PAIN DESCRIPTION - LOCATION: LOCATION: HEAD

## 2024-04-08 VITALS
SYSTOLIC BLOOD PRESSURE: 146 MMHG | OXYGEN SATURATION: 100 % | DIASTOLIC BLOOD PRESSURE: 90 MMHG | HEART RATE: 71 BPM | RESPIRATION RATE: 20 BRPM | BODY MASS INDEX: 24.69 KG/M2 | HEIGHT: 62 IN | TEMPERATURE: 97.4 F | WEIGHT: 134.2 LBS

## 2024-04-08 PROBLEM — J18.9 PNEUMONIA DUE TO INFECTIOUS ORGANISM: Status: RESOLVED | Noted: 2024-04-03 | Resolved: 2024-04-08

## 2024-04-08 LAB
ALBUMIN SERPL-MCNC: 3.5 G/DL (ref 3.5–5.2)
ALP SERPL-CCNC: 86 U/L (ref 35–104)
ALT SERPL-CCNC: 24 U/L (ref 0–32)
ANION GAP SERPL CALCULATED.3IONS-SCNC: 14 MMOL/L (ref 7–16)
AST SERPL-CCNC: 21 U/L (ref 0–31)
BASOPHILS # BLD: 0.02 K/UL (ref 0–0.2)
BASOPHILS NFR BLD: 0 % (ref 0–2)
BILIRUB SERPL-MCNC: 0.2 MG/DL (ref 0–1.2)
BUN SERPL-MCNC: 24 MG/DL (ref 6–23)
CALCIUM SERPL-MCNC: 9.1 MG/DL (ref 8.6–10.2)
CHLORIDE SERPL-SCNC: 103 MMOL/L (ref 98–107)
CO2 SERPL-SCNC: 27 MMOL/L (ref 22–29)
CREAT SERPL-MCNC: 1.1 MG/DL (ref 0.5–1)
EOSINOPHIL # BLD: 0.27 K/UL (ref 0.05–0.5)
EOSINOPHILS RELATIVE PERCENT: 6 % (ref 0–6)
ERYTHROCYTE [DISTWIDTH] IN BLOOD BY AUTOMATED COUNT: 15.6 % (ref 11.5–15)
GFR SERPL CREATININE-BSD FRML MDRD: 56 ML/MIN/1.73M2
GLUCOSE SERPL-MCNC: 71 MG/DL (ref 74–99)
HCT VFR BLD AUTO: 39.4 % (ref 34–48)
HGB BLD-MCNC: 11.9 G/DL (ref 11.5–15.5)
IMM GRANULOCYTES # BLD AUTO: <0.03 K/UL (ref 0–0.58)
IMM GRANULOCYTES NFR BLD: 0 % (ref 0–5)
LYMPHOCYTES NFR BLD: 0.74 K/UL (ref 1.5–4)
LYMPHOCYTES RELATIVE PERCENT: 15 % (ref 20–42)
MCH RBC QN AUTO: 25.8 PG (ref 26–35)
MCHC RBC AUTO-ENTMCNC: 30.2 G/DL (ref 32–34.5)
MCV RBC AUTO: 85.3 FL (ref 80–99.9)
MONOCYTES NFR BLD: 0.63 K/UL (ref 0.1–0.95)
MONOCYTES NFR BLD: 13 % (ref 2–12)
NEUTROPHILS NFR BLD: 65 % (ref 43–80)
NEUTS SEG NFR BLD: 3.13 K/UL (ref 1.8–7.3)
PLATELET, FLUORESCENCE: 129 K/UL (ref 130–450)
PMV BLD AUTO: 12.6 FL (ref 7–12)
POTASSIUM SERPL-SCNC: 3.9 MMOL/L (ref 3.5–5)
PROT SERPL-MCNC: 5.9 G/DL (ref 6.4–8.3)
RBC # BLD AUTO: 4.62 M/UL (ref 3.5–5.5)
SODIUM SERPL-SCNC: 144 MMOL/L (ref 132–146)
WBC OTHER # BLD: 4.8 K/UL (ref 4.5–11.5)

## 2024-04-08 PROCEDURE — 94640 AIRWAY INHALATION TREATMENT: CPT

## 2024-04-08 PROCEDURE — 6370000000 HC RX 637 (ALT 250 FOR IP)

## 2024-04-08 PROCEDURE — 80053 COMPREHEN METABOLIC PANEL: CPT

## 2024-04-08 PROCEDURE — 6360000002 HC RX W HCPCS

## 2024-04-08 PROCEDURE — 36415 COLL VENOUS BLD VENIPUNCTURE: CPT

## 2024-04-08 PROCEDURE — 85025 COMPLETE CBC W/AUTO DIFF WBC: CPT

## 2024-04-08 PROCEDURE — 94660 CPAP INITIATION&MGMT: CPT

## 2024-04-08 PROCEDURE — 99238 HOSP IP/OBS DSCHRG MGMT 30/<: CPT

## 2024-04-08 PROCEDURE — 2580000003 HC RX 258

## 2024-04-08 RX ORDER — CIPROFLOXACIN 500 MG/1
500 TABLET, FILM COATED ORAL EVERY 12 HOURS SCHEDULED
Qty: 1 TABLET | Refills: 0 | Status: SHIPPED | OUTPATIENT
Start: 2024-04-08 | End: 2024-04-09

## 2024-04-08 RX ORDER — CYCLOBENZAPRINE HCL 10 MG
10 TABLET ORAL 3 TIMES DAILY PRN
Qty: 30 TABLET | Refills: 0 | Status: SHIPPED | OUTPATIENT
Start: 2024-04-08 | End: 2024-04-18

## 2024-04-08 RX ADMIN — DIGOXIN 62.5 MCG: 125 TABLET ORAL at 09:55

## 2024-04-08 RX ADMIN — SODIUM CHLORIDE, PRESERVATIVE FREE 10 ML: 5 INJECTION INTRAVENOUS at 10:05

## 2024-04-08 RX ADMIN — GUAIFENESIN 400 MG: 400 TABLET ORAL at 09:55

## 2024-04-08 RX ADMIN — DESMOPRESSIN ACETATE 200 MCG: 0.1 TABLET ORAL at 09:57

## 2024-04-08 RX ADMIN — CIPROFLOXACIN HYDROCHLORIDE 500 MG: 500 TABLET, FILM COATED ORAL at 09:59

## 2024-04-08 RX ADMIN — IPRATROPIUM BROMIDE AND ALBUTEROL SULFATE 1 DOSE: 2.5; .5 SOLUTION RESPIRATORY (INHALATION) at 12:45

## 2024-04-08 RX ADMIN — ACETAMINOPHEN 650 MG: 325 TABLET ORAL at 06:12

## 2024-04-08 RX ADMIN — OMEGA-3-ACID ETHYL ESTERS 2 G: 1 CAPSULE, LIQUID FILLED ORAL at 09:59

## 2024-04-08 RX ADMIN — METOPROLOL SUCCINATE 50 MG: 50 TABLET, EXTENDED RELEASE ORAL at 09:56

## 2024-04-08 RX ADMIN — FAMOTIDINE 20 MG: 20 TABLET, FILM COATED ORAL at 09:55

## 2024-04-08 RX ADMIN — MULTIVITAMIN TABLET 1 TABLET: TABLET at 09:59

## 2024-04-08 RX ADMIN — GUAIFENESIN 400 MG: 400 TABLET ORAL at 12:30

## 2024-04-08 RX ADMIN — CYCLOBENZAPRINE HYDROCHLORIDE 10 MG: 10 TABLET, FILM COATED ORAL at 06:08

## 2024-04-08 RX ADMIN — FERROUS SULFATE TAB 325 MG (65 MG ELEMENTAL FE) 325 MG: 325 (65 FE) TAB at 09:55

## 2024-04-08 RX ADMIN — LEVOTHYROXINE SODIUM 50 MCG: 0.05 TABLET ORAL at 06:08

## 2024-04-08 RX ADMIN — FUROSEMIDE 20 MG: 20 TABLET ORAL at 09:54

## 2024-04-08 RX ADMIN — AMLODIPINE BESYLATE 5 MG: 5 TABLET ORAL at 09:55

## 2024-04-08 RX ADMIN — IPRATROPIUM BROMIDE AND ALBUTEROL SULFATE 1 DOSE: 2.5; .5 SOLUTION RESPIRATORY (INHALATION) at 07:48

## 2024-04-08 RX ADMIN — SILDENAFIL 10 MG: 20 TABLET, FILM COATED ORAL at 14:46

## 2024-04-08 RX ADMIN — ARFORMOTEROL TARTRATE 15 MCG: 15 SOLUTION RESPIRATORY (INHALATION) at 07:48

## 2024-04-08 RX ADMIN — LEVETIRACETAM 500 MG: 500 TABLET, FILM COATED ORAL at 09:54

## 2024-04-08 RX ADMIN — Medication 1000 UNITS: at 09:55

## 2024-04-08 RX ADMIN — BUDESONIDE INHALATION 500 MCG: 0.5 SUSPENSION RESPIRATORY (INHALATION) at 07:48

## 2024-04-08 RX ADMIN — SILDENAFIL 10 MG: 20 TABLET, FILM COATED ORAL at 09:57

## 2024-04-08 RX ADMIN — APIXABAN 5 MG: 5 TABLET, FILM COATED ORAL at 09:55

## 2024-04-08 RX ADMIN — DULOXETINE HYDROCHLORIDE 60 MG: 60 CAPSULE, DELAYED RELEASE ORAL at 09:54

## 2024-04-08 ASSESSMENT — PAIN DESCRIPTION - ORIENTATION: ORIENTATION: MID

## 2024-04-08 ASSESSMENT — PAIN DESCRIPTION - LOCATION: LOCATION: RIB CAGE

## 2024-04-08 ASSESSMENT — PAIN SCALES - GENERAL: PAINLEVEL_OUTOF10: 8

## 2024-04-08 ASSESSMENT — PAIN DESCRIPTION - DESCRIPTORS: DESCRIPTORS: ACHING;DISCOMFORT

## 2024-04-08 NOTE — PROGRESS NOTES
OCCUPATIONAL THERAPY INITIAL EVALUATION    [facility]  [address], [place]        Date:2024                                                  Patient Name: Rebekah Rodriguez    MRN: [id number]    : 1955    Room: [address] [place] [address]          Evaluating OT: Micheline Stratton OTR/L; [id number]       Referring Provider: Bobby Swan MD     Specific Provider Orders/Date: OT Eval and Treat 24      Diagnosis: Pneumonia due to infectious organism      Surgery: None this admission     Pertinent Medical History:  has a past medical history of A-fib ([facility]), Abnormal mammogram, Acute on chronic respiratory failure ([facility]), Anemia, Anemia due to chronic illness, Ankle fracture, left, 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[facility]), Backache, Benign hypertension, CHF (congestive heart failure) ([facility]), Chronic back pain, Chronic kidney disease, Chronic pain disorder, Chronic, continuous use of opioids, Compression fracture of thoracic vertebra ([facility]), COPD (chronic obstructive pulmonary disease) ([facility]), Debility, Deformity of ankle and foot, acquired, Depression, Diabetes insipidus ([facility]), Diverticulitis, Dry eyes, Encephalopathy acute, Gait disturbance, GERD (gastroesophageal reflux disease), Hemorrhoids, Hernia, History of blood transfusion, History of Clostridium difficile, Hyperlipidemia, Hyperthyroidism, Hypothyroidism, Ischemic colitis, enteritis, or enterocolitis ([facility]), Long term (current) use of systemic steroids, Long-term current use of steroids, Lumbar disc herniation, Myalgia and myositis, unspecified, Nephrosclerosis, Nonunion of fracture, Osteoarthritis, PAF (paroxysmal atrial fibrillation) ([facility]), 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[facility]), Pulmonary hypertension ([facility]), Pulmonary sarcoidosis ([facility]), Rectal bleeding, Rhabdomyolysis, Steroid-induced avascular necrosis of shoulder ([facility]), Tibia fracture, and Ventral hernia without obstruction or 
4 Eyes Skin Assessment     NAME:  Rebekah Rodriguez  YOB: 1955  MEDICAL RECORD NUMBER:  [id number]    The patient is being assessed for  Admission    I agree that at least one RN has performed a thorough Head to Toe Skin Assessment on the patient. ALL assessment sites listed below have been assessed.      Areas assessed by both nurses:    Head, Face, Ears, Shoulders, Back, Chest, Arms, Elbows, Hands, Sacrum. Buttock, Coccyx, Ischium, and Legs. Feet and Heels        Does the Patient have a Wound? No noted wound(s)       Blas Prevention initiated by RN: No  Wound Care Orders initiated by RN: No    Pressure Injury (Stage 3,4, Unstageable, DTI, NWPT, and Complex wounds) if present, place Wound referral order by RN under : No    New Ostomies, if present place, Ostomy referral order under : No     Nurse 1 eSignature: Electronically signed by Celia Mccain RN on 4/4/24 at 11:33 AM EDT    **SHARE this note so that the co-signing nurse can place an eSignature**    Nurse 2 eSignature: {Esignature:[id number]}    
Attending MD notified via perfectserve of decrease in pt HR along with 3-4 sec pauses. Awaiting orders. Pt asymptomatic. /73.   
[facility] - Family Medicine Inpatient   Resident Progress Note    S:  Hospital day: 4    Brief Synopsis: Rebekah Rodriguez is a 68 y.o. female  chronically on 6L of O2 with PMHx of pulmonary hypertension, rectal prolapse, sarcoidosis, permanent A-fib on apixaban,Chronic HFpEF (EF 60%),  chronic hypoxic respiratory failure, ckd. Copd, PE, adrenal insufficiency on hydrocortisone, central diabetes insipidus who presents to the ED with SOB.   Has been presenting shortness of breath off and on over the past days, most noticeably today.   Follows with pulmonology and seen last in clinic on feb 2024.  States has been compliant with her medications. Has been using bipap at nights and her home oxygen throughout the day.   Recently for this past couple of days, has felt short of breath at rest and her home oxygen and inhalers have not provided relief of symptoms. Has also been presenting Increase sputum production. No known sick contacts.  Denies any fevers, chills. No headaches., vomiting, diarrhea.    At the ED, Troponin 20, repeat at 19.  EKG showing A-fib with slow ventricular response observed on previous EKG. CBC no leukocytosis, no anemia.  CMP no significant electrolyte abnormalities.  proBNP at 4,112.,  Improved from previous ranging from 6000-10,000 from January to February   Patient was given Duoneb X 1, Started on Rocephin, Doxycycline and admitted for Pneumonia   Patient was subsequently changed to Cipro 500 mg BID    Overnight Events:  Patient was bradycardic from 39-50 while sleeping and 50-60 bpm while awake overnight with several short pauses  Patient reports that her bowel movements have become less frequent, still endorsing nausea but her abdominal pain is improved  She denies chest pain aside from her baseline tenderness to palpation  States that her breathing has improved but is not yet back to baseline    Cont meds:    sodium chloride       Scheduled meds:    amLODIPine  5 mg Oral Daily    arformoterol 
CLINICAL PHARMACY NOTE: MEDS TO BEDS    Total # of Prescriptions Filled: 2   The following medications were delivered to the patient:  Ciprofloxacin 500 mg  Cyclobenzaprine 10 mg    Additional Documentation:   Delivered to RN joi)  
[facility] - Family Medicine Inpatient   Resident Progress Note    S:  Hospital day: 2    Brief Synopsis: Rebekah Rodriguez is a 68 y.o. female  chronically on 6L of O2 with PMHx of pulmonary hypertension, rectal prolapse, sarcoidosis, permanent A-fib on apixaban,Chronic HFpEF (EF 60%),  chronic hypoxic respiratory failure, ckd. Copd, PE, adrenal insufficiency on hydrocortisone, central diabetes insipidus who presents to the ED with SOB.   Has been presenting shortness of breath off and on over the past days, most noticeably today.   Follows with pulmonology and seen last in clinic on feb 2024.  States has been compliant with her medications. Has been using bipap at nights and her home oxygen throughout the day.   Recently for this past couple of days, has felt short of breath at rest and her home oxygen and inhalers have not provided relief of symptoms. Has also been presenting Increase sputum production. No known sick contacts.  Denies any fevers, chills. No headaches., vomiting, diarrhea.    At the ED, Troponin 20, repeat at 19.  EKG showing A-fib with slow ventricular response observed on previous EKG. CBC no leukocytosis, no anemia.  CMP no significant electrolyte abnormalities.  proBNP at 4,112.,  Improved from previous ranging from 6000-10,000 from January to February   Patient was given Duoneb X 1, Started on Rocephin, Doxycycline and admitted for Pneumonia     Overnight Events:  No acute events overnight   Patient was seen and examined this morning.   Has no complaints this morning. On 6 L of oxygen this morning.  Denies SOB/CP/Abd pain/HA/N/V/D    Cont meds:    sodium chloride       Scheduled meds:    amLODIPine  5 mg Oral Daily    arformoterol tartrate  15 mcg Nebulization BID RT    budesonide  500 mcg Nebulization BID    DESMOpressin  200 mcg Oral BID    digoxin  62.5 mcg Oral Daily    DULoxetine  60 mg Oral Daily    apixaban  5 mg Oral BID    famotidine  20 mg Oral Daily    ferrous sulfate  325 mg Oral BID    
[facility] - Family Medicine Inpatient   Resident Progress Note    S:  Hospital day: 3    Brief Synopsis: Rebekah Rodriguez is a 68 y.o. female  chronically on 6L of O2 with PMHx of pulmonary hypertension, rectal prolapse, sarcoidosis, permanent A-fib on apixaban,Chronic HFpEF (EF 60%),  chronic hypoxic respiratory failure, ckd. Copd, PE, adrenal insufficiency on hydrocortisone, central diabetes insipidus who presents to the ED with SOB.   Has been presenting shortness of breath off and on over the past days, most noticeably today.   Follows with pulmonology and seen last in clinic on feb 2024.  States has been compliant with her medications. Has been using bipap at nights and her home oxygen throughout the day.   Recently for this past couple of days, has felt short of breath at rest and her home oxygen and inhalers have not provided relief of symptoms. Has also been presenting Increase sputum production. No known sick contacts.  Denies any fevers, chills. No headaches., vomiting, diarrhea.    At the ED, Troponin 20, repeat at 19.  EKG showing A-fib with slow ventricular response observed on previous EKG. CBC no leukocytosis, no anemia.  CMP no significant electrolyte abnormalities.  proBNP at 4,112.,  Improved from previous ranging from 6000-10,000 from January to February   Patient was given Duoneb X 1, Started on Rocephin, Doxycycline and admitted for Pneumonia   Patient was subsequently changed to Cipro 500 mg BID    Overnight Events:  Overnight, patient was mildly bradycardic and hypotensive so sildenafil and metoprolol were held  The patient reports that she had several loose bowel movements overnight, approximately 4 episodes in total. She also reports feeling nauseated but without vomiting or abdominal pain   She states that her breathing has returned to baseline and she still has some tenderness to palpation of the chest, but overall she feels she is at her approximate baseline    Cont meds:    sodium chloride   
Date: 4/3/2024    Time: 6:26 PM    Patient Placed On BIPAP/CPAP/ Non-Invasive Ventilation?  Yes    If no must comment.  Facial area red/color change? No           If YES are Blister/Lesion present?No   If yes must notify nursing staff  BIPAP/CPAP skin barrier?  Yes    Skin barrier type:mepilexlite           Jennifer renny, [facility]       04/03/24 1824   NIV Type   NIV Started/Stopped On   Equipment Type V60   Mode Bilevel   Mask Type Full face mask   Mask Size Medium   Assessment   Comfort Level Good   Using Accessory Muscles No   Mask Compliance Good   Skin Assessment Clean, dry, & intact   Skin Protection for O2 Device Yes   Orientation Middle   Location Nose   Intervention(s) Skin Barrier   Settings/Measurements   PIP Observed 15 cm H20   IPAP 14 cmH20   CPAP/EPAP 6 cmH2O   Vt (Measured) 380 mL   Rate Ordered 12   Insp Rise Time (%) 3 %   FiO2  40 %   I Time/ I Time % 0.8 s   Minute Volume (L/min) 10 Liters   Mask Leak (lpm) 25 lpm   Patient's Home Machine No   Alarm Settings   Alarms On Y   Low Pressure (cmH2O) 10 cmH2O   High Pressure (cmH2O) 30 cmH2O   RR Low (bpm) 14   RR High (bpm) 40 br/min       
[facility]  Family Medicine Attending    S: 68 y.o. female with pneumonia.  Has longstanding hx of COPD on 6L O2 at home.  CXR consistent with PNA as well as mild volume overload.    Today, patient is resting in bed on CPAP.  No new complaints.  Short of breath persists.  No fever or chills.    Not having any further diarrhea. No nausea or vomiting. Eating well. Is on home supplemental O2 and breathing feels better. Continues to have chronic pain and would like opiate pain medications.     O: VS- Blood pressure 129/84, pulse 78, temperature 97.5 °F (36.4 °C), temperature source Temporal, resp. rate 20, height 1.575 m (5' 2\"), weight 59.4 kg (131 lb), SpO2 100 %, not currently breastfeeding.  Exam is as noted by resident with the following changes, additions or corrections:  Gen:  AAO x 3  CVS RRR  Lungs bilateral wheeze with diminished breath sounds bilaterally.  Ext no CCE    Impressions:  Acute on chronic respiratory failure 2/2 pneumonia and possible mild fluid overload  Pneumonia due to infectious organism  Severe COPD, end stage on 6 L at home  Severe pulmonary hypertension  HFpEF  Sarcoidosis  Hx PE  Hx DI  CKD  Hx seizure disorder      Plan:     Infectious work up  Abx, steroids  Pulm consulted  Wean O2 as able, continue BiPAP prn   Hold miralax.   Monitor bowel movements , if worsening will check c diff .   Patient would like pain meds for diffuse pain  however will hold off due to history of constipation and respiratory depression with opiates.   Hold PO potassium today given hyperkalemia   Cipro day 4/5 .     1/2 cultures + for staph epi . Will repeat blood cultures. Concern for contaminant. Patient with no other symptoms , no white count or fever. Check procal. If concerning for infection will start abx.      Attending Physician Statement  I have reviewed the chart and seen the patient with the resident(s).  I personally reviewed images, EKG's and similar tests, if present.  I personally 
[facility]  Family Medicine Attending    S: 68 y.o. female with pneumonia.  Has longstanding hx of COPD on 6L O2 at home.  CXR consistent with PNA as well as mild volume overload.    Today, patient is resting in bed on CPAP.  No new complaints.  Short of breath persists.  No fever or chills.    Multiple bowel movements. Nasuea but not vomiting. Abdominal cramping. She had diarrhea overnight that has slowed down this morning. Diarrhea was very bothersome to her.     O: VS- Blood pressure 134/86, pulse 74, temperature 97.8 °F (36.6 °C), temperature source Temporal, resp. rate 20, height 1.575 m (5' 2\"), weight 59.4 kg (131 lb), SpO2 94 %, not currently breastfeeding.  Exam is as noted by resident with the following changes, additions or corrections:  Gen:  AAO x 3  CVS RRR  Lungs bilateral wheeze with diminished breath sounds bilaterally.  Ext no CCE    Impressions:  Acute on chronic respiratory failure 2/2 pneumonia and possible mild fluid overload  Pneumonia due to infectious organism  Severe COPD, end stage on 6 L at home  Severe pulmonary hypertension  HFpEF  Sarcoidosis  Hx PE  Hx DI  CKD  Hx seizure disorder      Plan:     Infectious work up  Abx, steroids  Pulm consulted  Wean O2 as able, continue BiPAP prn   Hold miralax.   Monitor bowel movements , if worsening will check c diff .   Patient would like pain meds for diffuse pain  however will hold off due to history of constipation and respiratory depression with opiates.   Hold PO potassium today given hyperkalemia      Attending Physician Statement  I have reviewed the chart and seen the patient with the resident(s).  I personally reviewed images, EKG's and similar tests, if present.  I personally reviewed and performed key elements of the history and exam.  I have reviewed and confirmed the Family Medicine admitting team resident history and exam with changes as indicated above.  I agree with the assessment, plan, and orders as documented 
[facility]  Family Medicine Attending    S: 68 y.o. female with pneumonia.  Has longstanding hx of COPD on 6L O2 at home.  CXR consistent with PNA as well as mild volume overload.    Today, patient is resting in bed on CPAP.  No new complaints.  Short of breath persists.  No fever or chills.    Not having any further diarrhea. No nausea or vomiting. Eating well. Is on home supplemental O2 and breathing feels better. Is agreeable to going home.     O: VS- Blood pressure (!) 140/96, pulse 70, temperature 98.2 °F (36.8 °C), temperature source Temporal, resp. rate 22, height 1.575 m (5' 2\"), weight 60.9 kg (134 lb 3.2 oz), SpO2 100 %, not currently breastfeeding.  Exam is as noted by resident with the following changes, additions or corrections:  Gen:  AAO x 3  CVS RRR  Lungs bilateral wheeze with diminished breath sounds bilaterally.  Ext no CCE    Impressions:  Acute on chronic respiratory failure 2/2 pneumonia and possible mild fluid overload  Pneumonia due to infectious organism  Severe COPD, end stage on 6 L at home  Severe pulmonary hypertension  HFpEF  Sarcoidosis  Hx PE  Hx DI  CKD  Hx seizure disorder      Plan:     Abx, steroids  Pulm consulted  Wean O2 as able, continue BiPAP prn   Hold miralax.     Patient would like pain meds for diffuse pain  however will hold off due to history of constipation and respiratory depression with opiates.   Hold PO potassium today given hyperkalemia   Cipro day 5/5 . Will need to take evening dose at home.     1/2 cultures + for staph epi . Most likely contaminant. Repeat blood cultures negative to date.      Attending Physician Statement  I have reviewed the chart and seen the patient with the resident(s).  I personally reviewed images, EKG's and similar tests, if present.  I personally reviewed and performed key elements of the history and exam.  I have reviewed and confirmed the Family Medicine admitting team resident history and exam with changes as 
Patient has had a HR while sleeping 39-50. While awake her HR is 50-60. She has had several short pauses and one 3 second pause. [facility] notified.   
Peripheral IV pulled out during day shift and not replaced. Two night shift RN's on 74 attempted PIV placement and two ICU RN's attempted PIV placement with no success. Patient refused to allow anyone else to attempt IV insertion at this time. PerfectServe message sent to IV team requesting them to come attempt PIV in the morning.   
Pruewiaugusto placed on pt to obtain urine sample  
[facility] Med resident messaged via perfect serve to check if there are any plans for discharge today and if not, can she be downgraded to a general floor.  Dr Carr taking messages  
[facility]  Family Medicine Attending    S: 68 y.o. female with pneumonia.  Has longstanding hx of COPD on 6L O2 at home.  CXR consistent with PNA as well as mild volume overload.    Today, patient is resting in bed in the ED.  No new complaints.  Short of breath.    O: VS- Blood pressure (!) 156/105, pulse 57, temperature 97.7 °F (36.5 °C), temperature source Oral, resp. rate 18, height 1.575 m (5' 2\"), weight 58.1 kg (128 lb), SpO2 97 %, not currently breastfeeding.  Exam is as noted by resident with the following changes, additions or corrections:  Gen:  AAO x 3  CVS RRR  Lungs bilateral wheeze with diminished breath sounds bilaterally.  Ext no CCE    Impressions:  Acute on chronic respiratory failure 2/2 pneumonia and possible mild fluid overload  Pneumonia due to infectious organism  Severe COPD, end stage on 6 L at home  Severe pulmonary hypertension  HFpEF  Sarcoidosis  Hx PE  Hx DI  CKD  Hx seizure disorder      Plan:     Infectious work up  Abx, steroids  Pulm consuted  Wean O2 as able     Attending Physician Statement  I have reviewed the chart and seen the patient with the resident(s).  I personally reviewed images, EKG's and similar tests, if present.  I personally reviewed and performed key elements of the history and exam.  I have reviewed and confirmed the Family Medicine admitting team resident history and exam with changes as indicated above.  I agree with the assessment, plan, and orders as documented by the  admitting team resident Dr. Castillo.  Please refer to the resident progress note today and admission history and physical  for additional information.      JOESPH GARCIA MD          
SPEECH/LANGUAGE PATHOLOGY  CLINICAL ASSESSMENT OF SWALLOWING FUNCTION   and PLAN OF CARE  PATIENT NAME:  Rebekah Rodriguez  (female)     MRN:  [id number]    :  1955  (68 y.o.)  STATUS:  Emergency visit:  Room 14b  TODAY'S DATE:  2024  REFERRING PROVIDER:    SPECIFIC PROVIDER ORDER: Speech language pathology evaluation Date of order:  24  REASON FOR REFERRAL: dysphagia   EVALUATING THERAPIST: BLAKE Moreno                 ASSESSMENT:    DYSPHAGIA DIAGNOSIS:   Clinical indicators of functional oropharyngeal swallow for age/premorbid functioning      DIET RECOMMENDATIONS:  Regular consistency solids (IDDSI level 7) with  thin liquids (IDDSI level 0)     FEEDING RECOMMENDATIONS:     Assistance level:  No assistance needed      Compensatory strategies recommended: No strategies are recommended at this time      Discussed recommendations with nursing?: No secondary to no diet/liquid change recommended     SPEECH THERAPY  PLAN OF CARE   The dysphagia POC is established based on physician order, dysphagia diagnosis and results of clinical assessment     Dysphagia therapy is not recommended     Conditions Requiring Skilled Therapeutic Intervention for dysphagia:    not applicable    Specific dysphagia interventions to include:     Not applicable    Specific instructions for next treatment:  not applicable   Patient Treatment Goals:    Short Term Goals:  Not applicable no therapy warranted     Long Term Goals:   Not applicable no therapy warranted      Patient/family Goal:    not applicable                    ADMITTING DIAGNOSIS: COPD exacerbation (HCC) [J44.1]  Pneumonia due to infectious organism [J18.9]  Chest pain, unspecified type [R07.9]  Pneumonia due to infectious organism, unspecified laterality, unspecified part of lung [J18.9]    VISIT DIAGNOSIS:   Visit Diagnoses         Codes    COPD exacerbation (HCC)    -  Primary J44.1             PATIENT REPORT/COMPLAINT: denies difficulty 
[facility]  Family Medicine Residency Program  History and Physical    Patient:  Rebekah Rodriguez 68 y.o. female MRN: [id number]     Date of Service: 4/3/2024    Hospital Day: 1      Chief complaint: had concerns including Shortness of Breath (Increase SOB beginning today. C/O chest pain. +thinners. Wears BiPAP all day).    History of Present Illness   The patient is a 68 y.o. female chronically on 6L of O2 with PMHx of pulmonary hypertension, rectal prolapse, sarcoidosis, permanent A-fib on apixaban,Chronic HFpEF (EF 60%),  chronic hypoxic respiratory failure, ckd. Copd, PE, adrenal insufficiency on hydrocortisone, central diabetes insipidus who presents to the ED with SOB.   Has been presenting shortness of breath off and on over the past days, most noticeably today.   Follows with pulmonology and seen last in clinic on feb 2024.  States has been compliant with her medications. Has been using bipap at nights and her home oxygen throughout the day.   Recently for this past couple of days, has felt short of breath at rest and her home oxygen and inhalers have not provided relief of symptoms.  Has also been presenting Increase sputum production.   No known sick contacts.   Denies any fevers, chills. No headaches., vomiting, diarrhea.      In the ED  Arrived hypoxic 87% on home oxygen, corrected after being placed on BiPAP.  Hemodynamically stable  CXray with   Impression  1. Mild volume overload.  2. New increased density in the right lateral lung base, atelectasis versus  pneumonia.  3. Stable increased density in the right lateral upper lung corresponding to  the anterior right 1st rib. This may be a sclerotic metastatic lesion.  4. Severe primary osteoarthritis of the glenohumeral articulations  bilaterally with prominent erosion and resorption of the right humeral head.    Troponin 20, repeat at 19.  Chronically elevated  EKG showing A-fib with slow ventricular response observed on previous 
[facility]  Family Medicine Attending    S: 68 y.o. female with pneumonia.  Has longstanding hx of COPD on 6L O2 at home.  CXR consistent with PNA as well as mild volume overload.    Today, patient is resting in bed on CPAP.  No new complaints.  Short of breath persists.  No fever or chills.    O: VS- Blood pressure (!) 147/84, pulse 79, temperature 97.8 °F (36.6 °C), temperature source Temporal, resp. rate 20, height 1.575 m (5' 2\"), weight 58.9 kg (129 lb 12.8 oz), SpO2 97 %, not currently breastfeeding.  Exam is as noted by resident with the following changes, additions or corrections:  Gen:  AAO x 3  CVS RRR  Lungs bilateral wheeze with diminished breath sounds bilaterally.  Ext no CCE    Impressions:  Acute on chronic respiratory failure 2/2 pneumonia and possible mild fluid overload  Pneumonia due to infectious organism  Severe COPD, end stage on 6 L at home  Severe pulmonary hypertension  HFpEF  Sarcoidosis  Hx PE  Hx DI  CKD  Hx seizure disorder      Plan:     Infectious work up  Abx, steroids  Pulm consuted  Wean O2 as able     Attending Physician Statement  I have reviewed the chart and seen the patient with the resident(s).  I personally reviewed images, EKG's and similar tests, if present.  I personally reviewed and performed key elements of the history and exam.  I have reviewed and confirmed the Family Medicine admitting team resident history and exam with changes as indicated above.  I agree with the assessment, plan, and orders as documented by the  admitting team resident Dr. Castillo/Minerva.  Please refer to the resident progress note today for additional information.      JOESPH GARCIA MD          
hydrocortisone  5 mg Oral Nightly    levETIRAcetam  500 mg Oral BID    levothyroxine  50 mcg Oral Daily    metoprolol succinate  50 mg Oral BID    multivitamin  1 tablet Oral Daily    omega-3 acid ethyl esters  2 g Oral BID    polyethylene glycol  17 g Oral Daily    potassium chloride  20 mEq Oral Daily    sildenafil  10 mg Oral TID    Vitamin D  1,000 Units Oral Daily    sodium chloride flush  5-40 mL IntraVENous 2 times per day    ciprofloxacin  500 mg Oral 2 times per day    guaiFENesin  400 mg Oral TID    ipratropium 0.5 mg-albuterol 2.5 mg  1 Dose Inhalation Q4H WA RT    methylPREDNISolone  40 mg IntraVENous Q12H     PRN meds: albuterol, guaiFENesin, tiZANidine, sodium chloride flush, sodium chloride, ondansetron **OR** ondansetron, polyethylene glycol, acetaminophen **OR** acetaminophen     I reviewed the patient's Past Medical and Surgical History, Medications and Allergies.    O:  VS: BP (!) 146/96   Pulse 63   Temp 98 °F (36.7 °C) (Tympanic)   Resp 20   Ht 1.575 m (5' 2\")   Wt 56.2 kg (124 lb)   LMP  (LMP Unknown)   SpO2 95%   BMI 22.68 kg/m²     Physical Exam:  Constitutional/General: Alert and oriented x3, chronically ill appearing, in no acute distress, on  Head: Normocephalic and atraumatic  Respiratory:   Poor air movement, decreased lung sounds more so in bases.  lungs clear to auscultation bilaterally, no wheezes, rales, or rhonchi.  Cardiovascular:  irregular rate and rhythm. No murmurs, no gallops, no rubs. 2+ distal pulses. Equal extremity pulses.   Chest: No chest wall tenderness  GI: Large  herniation with scarring . No tenderness to palpation.   Musculoskeletal: Moves all extremities x 4. Warm and well perfused, no clubbing, no cyanosis, no edema.  Integument: skin warm and dry. No rashes.   Neurologic: GCS 15, no focal deficits, symmetric strength 5/5 in the upper and lower extremities bilaterally  Psychiatric: Normal Affect       Labs:  Na/K/Cl/CO2:  142/3.7/105/28 (04/04 
other surgical history (11/1/11); Abdomen surgery (2008); Echo Complete (4/22/2013); ECHO Compl W Dop Color Flow (8/16/2015); Upper gastrointestinal endoscopy (1/4/2016); Hemorrhoid surgery (12/08/2016); Colonoscopy (2008); Colonoscopy (04/11/2014); Colonoscopy (11/23/2015); Colonoscopy (10/24/2016); Colonoscopy (07/26/2017); Upper gastrointestinal endoscopy (07/26/2017); sigmoidoscopy (N/A, 3/19/2021); other surgical history (12/14/2021); Colonoscopy (N/A, 1/29/2024); and Rectal surgery (N/A, 2/2/2024).  Procedure/Surgery:  None  Precautions:  Falls, O2, TSM  Equipment Needs:  TBD    SUBJECTIVE:    Pt lives with  in a 1 story home with ramp to enter.  Bed is on 1st floor and bath is on 1st floor.  Pt used wheelchair for mobility prior to admission; transferred to/from wheelchair via stand pivot.    OBJECTIVE:   Initial Evaluation  Date: 4/4/2024 Treatment Short Term/ Long Term   Goals   AM-PAC 6 Clicks 12/24     Was pt agreeable to Eval/treatment? Yes     Does pt have pain? No c/o pain during activity     Bed Mobility  Rolling: NT  Supine to sit: Madi  Sit to supine: ModA  Scooting: Madi (seated)  Rolling: Supervision  Supine to sit: Supervision  Sit to supine: Supervision  Scooting: Supervision   Transfers Sit to stand: MaxA  Stand to sit: MaxA  Stand pivot: NT  Sit to stand: SBA  Stand to sit: SBA  Stand pivot: SBA with AAD   Ambulation    Couple side steps with no AD MaxA  25 feet with AAD SBA   Stair negotiation: ascended and descended  NT  TBD   Wheelchair mobility NT  150 feet Supervision   ROM BUE:  See OT note  BLE:  WFL     Strength BUE:  See OT note  BLE:  Grossly 5/5     Balance Sitting EOB:  SBA  Dynamic Standing:  MaxA with no AD  Sitting EOB:  Supervision  Dynamic Standing:  SBA with AAD     Pt is A & O x 4  Sensation:  No reports of numbness/tingling to extremities  Edema:  Unremarkable    Vitals (4 L O2):   HR 62, SpO2 95% at rest.  HR 76, SpO2 86% with activity.  HR 63, SpO2 91% following 
metastatic lesion.   4. Severe primary osteoarthritis of the glenohumeral articulations   bilaterally with prominent erosion and resorption of the right humeral head.             A/P:    Acute on Chronic hypoxic respiratory failure  Pulmonary sarcoidosis  COPD  Severe pulmonary hypertension, RVSP 119 mmHg with mild RV systolic dysfunction   Hx of Pulmonary embolism   Hx of Pseudomonas on Culture  Pulmonology following- transition to PO prednisone today and taper to chronic Cortef  Cipro 500 mg BID, patient appears to be at baseline from a cardiovascular and respiratory standpoint  Sputum culture, Legionella antigen, strep pneumo antigen - unremarkable  Mucinex 600 mg twice daily, pep flutter, incentive spirometer  1/2 blood cx +ve Staph epi, repeat cx with no growth < 24 hours    Frequent bowel movements  Possibly 2/2 abx     Persistent atrial fibrillation  CHFpEF  (EF 60%, on last echo)  Chest pains  Hypertension  Continue amlodipine 5 mg, continue Toprol 50 mg twice daily, digoxin 125 mcg and Eliquis 5 mg twice daily.  Continue Lasix 20 mg daily.     Diabetes insipidus  Hypothyroidism  Adrenal insufficiency  Type 2 diabetes mellitus  Continue home desmopressin   On hydrocortisone 15mg in AM and 5 mg at night   Continue home Levothyroxine 50 mcg daily  MDSS  Monitor BMP       CKD   Monitor GFR     Chronic pain syndrome and Anxiety  Cymbalta 60 mg daily      GERD   Pepcid 20 mg twice daily     Hx of seizures   Continue home Keppra 500 mg twice daily         PT/OT evaluation: PT consulted  DVT prophylaxis: Eliquis  GI prophylaxis:  Diet and famotidine  Diet:  Regular diet after bedside swallow  Code Status: Full code       Electronically signed by Debra Neal MD on 4/8/2024 at 6:22 AM  This case was discussed with attending physician Dr. Johnson  
spirometer, flutter valve  Scheduled bronchodilators-Brovana and Pulmicort twice daily, DuoNebs every 4 hours while awake  Sildenafil 3 times daily-follow[facility] at [facility]  Chest x-ray reviewed.  Last CT chest from December 2023  DVT, GI prophylaxis  PT/OT  Patient is stable from a pulmonary standpoint and is ok for discharge home      This plan of care was reviewed in collaboration with Dr. Agarwal    Electronically signed by FRIDA Santizo - CNP on 4/5/2024 at 3:59 PM      This is confirmation that I have personally performed a substantial portion of medical decision making (>50%) related to this patient encounter.  The medications & laboratory data and imagery were discussed and adjusted where necessary. Key issues of the case were discussed among consultants.  Review of CNP documentation was conducted and revisions were made as appropriate. I agree with the above documented exam, problem list and plan of care with the following additions:    Patient is at her baseline.  She is stable to be discharged from a pulmonary standpoint.    Joseph Agarwal MD

## 2024-04-08 NOTE — PLAN OF CARE
Problem: Discharge Planning  Goal: Discharge to home or other facility with appropriate resources  4/4/2024 2148 by Kelli Simms, RN  Outcome: Progressing  4/4/2024 1130 by Celia Mccain, RN  Outcome: Progressing     Problem: Safety - Adult  Goal: Free from fall injury  Outcome: Progressing     Problem: Skin/Tissue Integrity  Goal: Absence of new skin breakdown  Description: 1.  Monitor for areas of redness and/or skin breakdown  2.  Assess vascular access sites hourly  3.  Every 4-6 hours minimum:  Change oxygen saturation probe site  4.  Every 4-6 hours:  If on nasal continuous positive airway pressure, respiratory therapy assess nares and determine need for appliance change or resting period.  Outcome: Progressing     
  Problem: Discharge Planning  Goal: Discharge to home or other facility with appropriate resources  Outcome: Progressing     Problem: Safety - Adult  Goal: Free from fall injury  Outcome: Progressing     Problem: Skin/Tissue Integrity  Goal: Absence of new skin breakdown  Description: 1.  Monitor for areas of redness and/or skin breakdown  2.  Assess vascular access sites hourly  3.  Every 4-6 hours minimum:  Change oxygen saturation probe site  4.  Every 4-6 hours:  If on nasal continuous positive airway pressure, respiratory therapy assess nares and determine need for appliance change or resting period.  Outcome: Progressing     Problem: Chronic Conditions and Co-morbidities  Goal: Patient's chronic conditions and co-morbidity symptoms are monitored and maintained or improved  Outcome: Progressing     Problem: Pain  Goal: Verbalizes/displays adequate comfort level or baseline comfort level  Outcome: Progressing     
  Problem: Discharge Planning  Goal: Discharge to home or other facility with appropriate resources  Outcome: Progressing     Problem: Safety - Adult  Goal: Free from fall injury  Outcome: Progressing     Problem: Skin/Tissue Integrity  Goal: Absence of new skin breakdown  Description: 1.  Monitor for areas of redness and/or skin breakdown  2.  Assess vascular access sites hourly  3.  Every 4-6 hours minimum:  Change oxygen saturation probe site  4.  Every 4-6 hours:  If on nasal continuous positive airway pressure, respiratory therapy assess nares and determine need for appliance change or resting period.  Outcome: Progressing     Problem: Chronic Conditions and Co-morbidities  Goal: Patient's chronic conditions and co-morbidity symptoms are monitored and maintained or improved  Outcome: Progressing     Problem: Pain  Goal: Verbalizes/displays adequate comfort level or baseline comfort level  Outcome: Progressing     
comfort level  4/7/2024 2218 by Jose Mary, RN  Outcome: Progressing  Flowsheets (Taken 4/7/2024 2218)  Verbalizes/displays adequate comfort level or baseline comfort level:   Encourage patient to monitor pain and request assistance   Assess pain using appropriate pain scale   Administer analgesics based on type and severity of pain and evaluate response  4/7/2024 1803 by Fatuma Elias, RN  Outcome: Progressing

## 2024-04-08 NOTE — DISCHARGE INSTRUCTIONS
DISCHARGE INSTRUCTIONS - FAMILY MEDICINE    Follow up at the Critical access hospital ([facility]) on Visit date not found. If there are any issues and cannot present to your appointment, please contact us at [phone] and we will schedule a new appointment for you.     Future Appointments   Date Time Provider Department Center   4/12/2024 10:00 AM Lauro Ellington MD [facility]   4/18/2024 11:15 AM Edu Olivas MD [facility] [place]   11/4/2024  2:40 PM Joya Garcia MD [facility]  [address] [place] [address]         Phone: [phone]    Once discharged from [facility], you can :    Return to work : N/A  Activity : activity as tolerated  Stairs : As tolerated  Exercise : As tolerated  Lifting : As tolerated   Sexual activity : Yes  Driving : With seat belt on. NO driving on narcotic pain medication if prescribed   Medications : Always take your medications as prescribed  Wound Care: keep wound clean and dry  Diet : You are asked to make an attempt to improve diet and exercise patterns to aid in medical management of your medical condition/problem.    Call [facility] with any further questions. Return to Emergency Department with any worsening of your condition and/or fever greater than 101 degrees, new weakness, shortness of breath or chest pain.  ______________________________________________________________________    =====================================   [facility]   DISCHARGE INSTRUCTIONS   =====================================   Take your medications as directed in this summary     Follow up with all your future appointments as advised   Call [phone] to confirm your appointment with [facility] ([facility]) as soon as possible and/or if there are any appointment changes or other issues.     It is important that you follow up with us for better monitoring of the current cause of your

## 2024-04-08 NOTE — CARE COORDINATION
4/8/24 Update CM Note.  Pt admitted 4/3/24.  Discharge order noted today.  Plan is to return home With [facility] for SN PT and aid services, PA orders obtained. Home O2 provided by [employer].  Spouse to transport home and will bring portable O2 for use during ride home.   Maribel HERRERA RN-BC  [phone]

## 2024-04-08 NOTE — DISCHARGE SUMMARY
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    Altered mental status    BRBPR (bright red blood per rectum)    Severe pulmonary hypertension (HCC)    Pulmonary hypertension    Chronic kidney disease, stage III (moderate) (HCC)    Paroxysmal atrial fibrillation (HCC)    Mixed hyperlipidemia    Goals of care, counseling/discussion    Atrial fibrillation with RVR (HCC)    Chronic combined systolic and diastolic heart failure (HCC)    Chronic hypoxemic respiratory failure (HCC)    Mixed simple and mucopurulent chronic bronchitis (HCC)    Recurrent major depressive disorder, in partial remission (HCC)    Gait disturbance    Chest pain    Chronic, continuous use of opioids    PMB (postmenopausal bleeding)    Severe dysplasia of vagina    Epistaxis    Ventral hernia without obstruction or gangrene    Long term (current) use of systemic steroids    Nuclear senile cataract    Constipation    Abdominal pain    Small bowel obstruction (HCC)    Seizure (HCC)    Elevated troponin level    RVF (right ventricular failure) (HCC)    Severe protein-calorie malnutrition (HCC)    Hypokalemia    Primary hypertension    Acute combined systolic and diastolic CHF, NYHA class 1 (HCC)    Acute on chronic diastolic CHF (congestive heart failure) (HCC)    Acute on chronic respiratory failure (HCC)    Shortness of breath    Palliative care encounter    Palliative care by specialist    Congestive

## 2024-04-09 ENCOUNTER — TELEPHONE (OUTPATIENT)
Dept: FAMILY MEDICINE CLINIC | Age: 69
End: 2024-04-09

## 2024-04-09 LAB
MICROORGANISM SPEC CULT: NORMAL
SERVICE CMNT-IMP: NORMAL
SPECIMEN DESCRIPTION: NORMAL

## 2024-04-09 NOTE — TELEPHONE ENCOUNTER
Care Transitions Initial Follow Up Call    Outreach made within 2 business days of discharge: Yes    Patient: Rebekah Rodriguez Patient : 1955   MRN: [id number]  Reason for Admission: There are no discharge diagnoses documented for the most recent discharge.  Discharge Date: 24       Spoke with: message left for patient    Discharge department/facility: [facility]    Message left reminding of appt on  at 1000, requested return call to review discharge    Scheduled appointment with PCP within 7-14 days    Follow Up  Future Appointments   Date Time Provider Department Center   2024 10:00 AM Lauro Ellington MD [facility]   2024 11:15 AM Edu Olivas MD [facility]   2024  2:40 PM Joya Garcia MD [facility]       Goran Squires RN

## 2024-04-10 ENCOUNTER — TELEPHONE (OUTPATIENT)
Dept: FAMILY MEDICINE CLINIC | Age: 69
End: 2024-04-10

## 2024-04-10 NOTE — TELEPHONE ENCOUNTER
Care Transitions Initial Follow Up Call    Outreach made within 2 business days of discharge: Yes    Patient: Rebekah Rodriguez Patient : 1955   MRN: [id number]  Reason for Admission: There are no discharge diagnoses documented for the most recent discharge.  Discharge Date: 24       Spoke with: message left     Discharge department/facility: [facility]    Message left requesting return call and reminding of appt on     Scheduled appointment with PCP within 7-14 days    Follow Up  Future Appointments   Date Time Provider Department Center   2024  2:40 PM Lauro Ellington MD [facility] [place] [facility]   2024 11:15 AM Edu Olivas MD [facility]   2024  2:40 PM Joya Garcia MD [place] [facility]       Goran Squires RN

## 2024-04-11 DIAGNOSIS — Z76.0 MEDICATION REFILL: ICD-10-CM

## 2024-04-11 RX ORDER — FERROUS SULFATE 325(65) MG
325 TABLET ORAL 2 TIMES DAILY
Qty: 30 TABLET | Refills: 3 | Status: SHIPPED | OUTPATIENT
Start: 2024-04-11

## 2024-04-11 RX ORDER — OMEGA-3-ACID ETHYL ESTERS 1 G/1
2 CAPSULE, LIQUID FILLED ORAL 2 TIMES DAILY
Qty: 60 CAPSULE | Refills: 3 | Status: SHIPPED | OUTPATIENT
Start: 2024-04-11

## 2024-04-12 ENCOUNTER — TELEPHONE (OUTPATIENT)
Dept: FAMILY MEDICINE CLINIC | Age: 69
End: 2024-04-12

## 2024-04-12 NOTE — TELEPHONE ENCOUNTER
Pt  calling to reschedule hospital f/u feels pt is doing well currently he is asking if a f/u chest xray could be ordered if so can it be sent to james to due thru home care? Hospital f/u scheduled for next Wednesday    James Rosas  [phone]

## 2024-04-12 NOTE — TELEPHONE ENCOUNTER
Reached patient's . Advised him that it is too soon to repeat CXR as it will likely still show abnormalities. Advised to go off of patient's sxs and come for hospital f/u as soon as possible.

## 2024-04-17 DIAGNOSIS — E87.6 HYPOKALEMIA: ICD-10-CM

## 2024-04-17 RX ORDER — CYCLOBENZAPRINE HCL 10 MG
10 TABLET ORAL 3 TIMES DAILY PRN
Qty: 30 TABLET | Refills: 0 | Status: ON HOLD | OUTPATIENT
Start: 2024-04-17 | End: 2024-04-27

## 2024-04-17 RX ORDER — POTASSIUM CHLORIDE 20 MEQ/1
20 TABLET, EXTENDED RELEASE ORAL DAILY
Qty: 60 TABLET | Refills: 0 | Status: ON HOLD | OUTPATIENT
Start: 2024-04-17

## 2024-04-18 ENCOUNTER — OFFICE VISIT (OUTPATIENT)
Dept: ENDOCRINOLOGY | Age: 69
End: 2024-04-18
Payer: MEDICAID

## 2024-04-18 VITALS
HEIGHT: 60 IN | HEART RATE: 63 BPM | OXYGEN SATURATION: 99 % | SYSTOLIC BLOOD PRESSURE: 126 MMHG | BODY MASS INDEX: 24.54 KG/M2 | RESPIRATION RATE: 16 BRPM | WEIGHT: 125 LBS | DIASTOLIC BLOOD PRESSURE: 89 MMHG

## 2024-04-18 DIAGNOSIS — E03.8 CENTRAL HYPOTHYROIDISM: ICD-10-CM

## 2024-04-18 DIAGNOSIS — E23.2 DIABETES INSIPIDUS (HCC): ICD-10-CM

## 2024-04-18 DIAGNOSIS — E23.0 HYPOPITUITARISM (HCC): Primary | ICD-10-CM

## 2024-04-18 PROCEDURE — G8427 DOCREV CUR MEDS BY ELIG CLIN: HCPCS | Performed by: INTERNAL MEDICINE

## 2024-04-18 PROCEDURE — 3017F COLORECTAL CA SCREEN DOC REV: CPT | Performed by: INTERNAL MEDICINE

## 2024-04-18 PROCEDURE — 1123F ACP DISCUSS/DSCN MKR DOCD: CPT | Performed by: INTERNAL MEDICINE

## 2024-04-18 PROCEDURE — G8399 PT W/DXA RESULTS DOCUMENT: HCPCS | Performed by: INTERNAL MEDICINE

## 2024-04-18 PROCEDURE — 3079F DIAST BP 80-89 MM HG: CPT | Performed by: INTERNAL MEDICINE

## 2024-04-18 PROCEDURE — 3074F SYST BP LT 130 MM HG: CPT | Performed by: INTERNAL MEDICINE

## 2024-04-18 PROCEDURE — 99214 OFFICE O/P EST MOD 30 MIN: CPT | Performed by: INTERNAL MEDICINE

## 2024-04-18 PROCEDURE — 1090F PRES/ABSN URINE INCON ASSESS: CPT | Performed by: INTERNAL MEDICINE

## 2024-04-18 PROCEDURE — 1111F DSCHRG MED/CURRENT MED MERGE: CPT | Performed by: INTERNAL MEDICINE

## 2024-04-18 PROCEDURE — G8420 CALC BMI NORM PARAMETERS: HCPCS | Performed by: INTERNAL MEDICINE

## 2024-04-18 PROCEDURE — 1036F TOBACCO NON-USER: CPT | Performed by: INTERNAL MEDICINE

## 2024-04-18 RX ORDER — HYDROCORTISONE 5 MG/1
TABLET ORAL
Qty: 270 TABLET | Refills: 3 | Status: ON HOLD | OUTPATIENT
Start: 2024-04-18

## 2024-04-18 RX ORDER — DESMOPRESSIN ACETATE 0.2 MG/1
0.2 TABLET ORAL 2 TIMES DAILY
Qty: 180 TABLET | Refills: 3 | Status: ON HOLD | OUTPATIENT
Start: 2024-04-18

## 2024-04-18 RX ORDER — LEVOTHYROXINE SODIUM 0.05 MG/1
50 TABLET ORAL DAILY
Qty: 90 TABLET | Refills: 3 | Status: ON HOLD | OUTPATIENT
Start: 2024-04-18

## 2024-04-18 NOTE — PROGRESS NOTES
[facility] [place] [facility] END[facility]  [address], [address]  [facility] [address]  Dept: [phone]  Loc: [phone]      Date of Service: 4/18/2024  Primary Care Physician: Lauro Ellington MD  Consultant physician: Edu Olivas MD     Reason for the consultation:  Hypopituitarism, central diabetes insipidus, secondary adrenal insufficiency    History of Present Illness:  Rebekah Rodriguez is a 68 y.o. old female with PMH of congestive heart failure, sarcoidosis, A-fib, CKD and other listed below seen today for a follow-up visit on hypopituitarism    The patient was diagnosed with sarcoidosis in 1998 and has been on steroids since the diagnosis.  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  The patient is currently on desmopressin 0.2 mg twice daily, hydrocortisone 10 mg in the morning and 5 mg in the evening, and levothyroxine 50 mcg daily    Hydrocortisone dose was adjusted during recent hospitalization    The patient has been feeling good on the current medication.    Past Medical History   Past Medical History:   Diagnosis Date    A-fib ([facility])     follows with Dr Marquez    Abnormal mammogram 2010    Acute on chronic respiratory failure ([facility]) 05/05/2017    Anemia     Anemia due to chronic illness     Ankle fracture, left 2008    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[facility]) 03/26/2007    Backache     Benign hypertension     CHF (congestive heart failure) ([facility])     Chronic back pain     Chronic kidney disease     stage 3    Chronic pain disorder     Chronic, continuous use of opioids     Compression fracture of thoracic vertebra ([facility])     T10    COPD (chronic obstructive pulmonary disease) ([facility])     Debility     Deformity of ankle and foot, acquired 01/31/2011    Depression     Diabetes insipidus ([facility])     Diverticulitis     Dry eyes     Encephalopathy acute 05/05/2017    Gait

## 2024-04-18 NOTE — PATIENT INSTRUCTIONS
Recommendations for today's visit  Take hydrocortisone 10 mg in the morning and 5 mg in the evening   Take Levothyroxine 50 mcg daily  Take DDAVP 0.2 mg twice a day   Will need lab in few days     I you have any questions please call Dr. Olivas's office     Edu Olivas MD  Endocrinologist, [address] [address]   Phone: [phone]  Fax: [phone]  Email: [email or web address]      ADRENAL STRESS-DOSING INSTRUCTIONS    Why adrenal stress-dosing? Adrenal hormones are needed for life. The key adrenal hormone is cortisol (also called hydrocortisone). Cortisol keeps the circulation in tone, maintains the blood pressure, prevents blood sugar from going too low and helps control inflammation. It helps the body respond to illness, injury, surgery or other physical stress. The normal adrenal gland normally makes as much as the body needs, and it can automatically make many times more than normal if necessary.  Some people make enough cortisol for times when they feel well, but not enough for times when they are sick. This is called adrenal insufficiency (or cortisol deficiency). It can cause weight loss, fatigue, weakness, dizziness, fainting, or even collapse.     What to do when you are sick. People who do not make enough cortisol must take extra cortisol when they are sick or injured (STRESS-DOSE STEROIDS). If you do not receive extra cortisol you could become very ill or may go into shock and can die unless given steroids promptly. Getting an emergency injection of cortisol can save your life at such times. Be sure to wear an ID to tell emergency workers that you do not make enough cortisol and need it when sick.          This extra cortisol comes in many forms which, in general are called \"steroids.\". They can be given by mouth, through a feeding tube, in the muscle or in a vein.     Instructions for every-day treatment:    Steroid Name                  Dose (amount)

## 2024-04-20 ENCOUNTER — HOSPITAL ENCOUNTER (INPATIENT)
Age: 69
LOS: 2 days | Discharge: HOME HEALTH | DRG: 189 | End: 2024-04-22
Attending: EMERGENCY MEDICINE | Admitting: FAMILY MEDICINE
Payer: MEDICARE

## 2024-04-20 ENCOUNTER — APPOINTMENT (OUTPATIENT)
Dept: GENERAL RADIOLOGY | Age: 69
DRG: 189 | End: 2024-04-20
Payer: MEDICARE

## 2024-04-20 DIAGNOSIS — E87.6 HYPOKALEMIA: ICD-10-CM

## 2024-04-20 DIAGNOSIS — J44.1 COPD EXACERBATION (HCC): ICD-10-CM

## 2024-04-20 DIAGNOSIS — J96.01 ACUTE RESPIRATORY FAILURE WITH HYPOXIA (HCC): Primary | ICD-10-CM

## 2024-04-20 LAB
AADO2: 297 MMHG
AADO2: 365.2 MMHG
ALBUMIN SERPL-MCNC: 4.3 G/DL (ref 3.5–5.2)
ALP SERPL-CCNC: 201 U/L (ref 35–104)
ALT SERPL-CCNC: 56 U/L (ref 0–32)
ANION GAP SERPL CALCULATED.3IONS-SCNC: 11 MMOL/L (ref 7–16)
AST SERPL-CCNC: 68 U/L (ref 0–31)
B PARAP IS1001 DNA NPH QL NAA+NON-PROBE: NOT DETECTED
B PERT DNA SPEC QL NAA+PROBE: NOT DETECTED
B.E.: 1.2 MMOL/L (ref -3–3)
B.E.: 3.2 MMOL/L (ref -3–3)
BASOPHILS # BLD: 0.04 K/UL (ref 0–0.2)
BASOPHILS NFR BLD: 1 % (ref 0–2)
BILIRUB SERPL-MCNC: 0.6 MG/DL (ref 0–1.2)
BNP SERPL-MCNC: 6019 PG/ML (ref 0–125)
BUN SERPL-MCNC: 17 MG/DL (ref 6–23)
C PNEUM DNA NPH QL NAA+NON-PROBE: NOT DETECTED
CALCIUM SERPL-MCNC: 9.6 MG/DL (ref 8.6–10.2)
CHLORIDE SERPL-SCNC: 97 MMOL/L (ref 98–107)
CO2 SERPL-SCNC: 28 MMOL/L (ref 22–29)
COHB: 0.2 % (ref 0–1.5)
COHB: 0.5 % (ref 0–1.5)
CREAT SERPL-MCNC: 1 MG/DL (ref 0.5–1)
CRITICAL: ABNORMAL
CRITICAL: ABNORMAL
DATE ANALYZED: ABNORMAL
DATE ANALYZED: ABNORMAL
DATE OF COLLECTION: ABNORMAL
DATE OF COLLECTION: ABNORMAL
EOSINOPHIL # BLD: 0.29 K/UL (ref 0.05–0.5)
EOSINOPHILS RELATIVE PERCENT: 6 % (ref 0–6)
ERYTHROCYTE [DISTWIDTH] IN BLOOD BY AUTOMATED COUNT: 14.3 % (ref 11.5–15)
FERRITIN SERPL-MCNC: 310 NG/ML
FIO2: 100 %
FIO2: 60 %
FLUAV RNA NPH QL NAA+NON-PROBE: NOT DETECTED
FLUBV RNA NPH QL NAA+NON-PROBE: NOT DETECTED
FOLATE SERPL-MCNC: >20 NG/ML (ref 4.8–24.2)
GFR SERPL CREATININE-BSD FRML MDRD: 64 ML/MIN/1.73M2
GLUCOSE BLD-MCNC: 110 MG/DL (ref 74–99)
GLUCOSE SERPL-MCNC: 87 MG/DL (ref 74–99)
HADV DNA NPH QL NAA+NON-PROBE: NOT DETECTED
HCO3: 26.5 MMOL/L (ref 22–26)
HCO3: 28 MMOL/L (ref 22–26)
HCOV 229E RNA NPH QL NAA+NON-PROBE: NOT DETECTED
HCOV HKU1 RNA NPH QL NAA+NON-PROBE: NOT DETECTED
HCOV NL63 RNA NPH QL NAA+NON-PROBE: NOT DETECTED
HCOV OC43 RNA NPH QL NAA+NON-PROBE: NOT DETECTED
HCT VFR BLD AUTO: 35.3 % (ref 34–48)
HGB BLD-MCNC: 10.8 G/DL (ref 11.5–15.5)
HHB: 0.5 % (ref 0–5)
HHB: 9.3 % (ref 0–5)
HMPV RNA NPH QL NAA+NON-PROBE: NOT DETECTED
HPIV1 RNA NPH QL NAA+NON-PROBE: NOT DETECTED
HPIV2 RNA NPH QL NAA+NON-PROBE: NOT DETECTED
HPIV3 RNA NPH QL NAA+NON-PROBE: NOT DETECTED
HPIV4 RNA NPH QL NAA+NON-PROBE: NOT DETECTED
IMM RETICS NFR: 27.2 % (ref 3–15.9)
IRON SATN MFR SERPL: 16 % (ref 15–50)
IRON SERPL-MCNC: 37 UG/DL (ref 37–145)
LAB: ABNORMAL
LAB: ABNORMAL
LYMPHOCYTES NFR BLD: 0.38 K/UL (ref 1.5–4)
LYMPHOCYTES RELATIVE PERCENT: 8 % (ref 20–42)
Lab: 1300
Lab: 1525
M PNEUMO DNA NPH QL NAA+NON-PROBE: NOT DETECTED
MAGNESIUM SERPL-MCNC: 1.6 MG/DL (ref 1.6–2.6)
MCH RBC QN AUTO: 25.9 PG (ref 26–35)
MCHC RBC AUTO-ENTMCNC: 30.6 G/DL (ref 32–34.5)
MCV RBC AUTO: 84.7 FL (ref 80–99.9)
METHB: 0.3 % (ref 0–1.5)
METHB: 0.4 % (ref 0–1.5)
MODE: ABNORMAL
MODE: ABNORMAL
MONOCYTES NFR BLD: 0.29 K/UL (ref 0.1–0.95)
MONOCYTES NFR BLD: 6 % (ref 2–12)
NEUTROPHILS NFR BLD: 79 % (ref 43–80)
NEUTS SEG NFR BLD: 3.8 K/UL (ref 1.8–7.3)
O2 SATURATION: 90.6 % (ref 92–98.5)
O2 SATURATION: 99.5 % (ref 92–98.5)
O2HB: 89.9 % (ref 94–97)
O2HB: 98.9 % (ref 94–97)
OPERATOR ID: 359
OPERATOR ID: 467
PATIENT TEMP: 37 C
PATIENT TEMP: 37 C
PCO2: 43.8 MMHG (ref 35–45)
PCO2: 45 MMHG (ref 35–45)
PEEP/CPAP: 6 CMH2O
PEEP/CPAP: 6 CMH2O
PFO2: 1.11 MMHG/%
PFO2: 2.79 MMHG/%
PH BLOOD GAS: 7.39 (ref 7.35–7.45)
PH BLOOD GAS: 7.42 (ref 7.35–7.45)
PLATELET # BLD AUTO: 143 K/UL (ref 130–450)
PMV BLD AUTO: 11 FL (ref 7–12)
PO2: 279 MMHG (ref 75–100)
PO2: 66.3 MMHG (ref 75–100)
POTASSIUM SERPL-SCNC: 3.2 MMOL/L (ref 3.5–5)
PROCALCITONIN SERPL-MCNC: 0.12 NG/ML (ref 0–0.08)
PROT SERPL-MCNC: 7.1 G/DL (ref 6.4–8.3)
PS: 12 CMH20
PS: 12 CMH20
RBC # BLD AUTO: 4.17 M/UL (ref 3.5–5.5)
RBC # BLD: ABNORMAL 10*6/UL
RETIC HEMOGLOBIN: 25.8 PG (ref 28.2–36.6)
RETICS # AUTO: 0.11 M/UL
RETICS/RBC NFR AUTO: 2.7 % (ref 0.4–1.9)
RI(T): 1.31
RI(T): 4.48
RSV RNA NPH QL NAA+NON-PROBE: NOT DETECTED
RV+EV RNA NPH QL NAA+NON-PROBE: NOT DETECTED
SARS-COV-2 RNA NPH QL NAA+NON-PROBE: NOT DETECTED
SODIUM SERPL-SCNC: 136 MMOL/L (ref 132–146)
SOURCE, BLOOD GAS: ABNORMAL
SOURCE, BLOOD GAS: ABNORMAL
SPECIMEN DESCRIPTION: NORMAL
T4 FREE SERPL-MCNC: 1.4 NG/DL (ref 0.9–1.7)
THB: 11.3 G/DL (ref 11.5–16.5)
THB: 11.8 G/DL (ref 11.5–16.5)
TIBC SERPL-MCNC: 231 UG/DL (ref 250–450)
TIME ANALYZED: 1306
TIME ANALYZED: 1545
TROPONIN I SERPL HS-MCNC: 14 NG/L (ref 0–9)
TROPONIN I SERPL HS-MCNC: 19 NG/L (ref 0–9)
TSH SERPL DL<=0.05 MIU/L-ACNC: 0.1 UIU/ML (ref 0.27–4.2)
VIT B12 SERPL-MCNC: 1145 PG/ML (ref 211–946)
WBC OTHER # BLD: 4.8 K/UL (ref 4.5–11.5)

## 2024-04-20 PROCEDURE — 84145 PROCALCITONIN (PCT): CPT

## 2024-04-20 PROCEDURE — 85045 AUTOMATED RETICULOCYTE COUNT: CPT

## 2024-04-20 PROCEDURE — 85025 COMPLETE CBC W/AUTO DIFF WBC: CPT

## 2024-04-20 PROCEDURE — 94664 DEMO&/EVAL PT USE INHALER: CPT

## 2024-04-20 PROCEDURE — 2140000000 HC CCU INTERMEDIATE R&B

## 2024-04-20 PROCEDURE — 84484 ASSAY OF TROPONIN QUANT: CPT

## 2024-04-20 PROCEDURE — 84439 ASSAY OF FREE THYROXINE: CPT

## 2024-04-20 PROCEDURE — 6370000000 HC RX 637 (ALT 250 FOR IP)

## 2024-04-20 PROCEDURE — 83880 ASSAY OF NATRIURETIC PEPTIDE: CPT

## 2024-04-20 PROCEDURE — 87449 NOS EACH ORGANISM AG IA: CPT

## 2024-04-20 PROCEDURE — 83540 ASSAY OF IRON: CPT

## 2024-04-20 PROCEDURE — 5A09457 ASSISTANCE WITH RESPIRATORY VENTILATION, 24-96 CONSECUTIVE HOURS, CONTINUOUS POSITIVE AIRWAY PRESSURE: ICD-10-PCS

## 2024-04-20 PROCEDURE — 93005 ELECTROCARDIOGRAM TRACING: CPT

## 2024-04-20 PROCEDURE — 94640 AIRWAY INHALATION TREATMENT: CPT

## 2024-04-20 PROCEDURE — 87899 AGENT NOS ASSAY W/OPTIC: CPT

## 2024-04-20 PROCEDURE — 82728 ASSAY OF FERRITIN: CPT

## 2024-04-20 PROCEDURE — 6360000002 HC RX W HCPCS

## 2024-04-20 PROCEDURE — 80053 COMPREHEN METABOLIC PANEL: CPT

## 2024-04-20 PROCEDURE — 99285 EMERGENCY DEPT VISIT HI MDM: CPT

## 2024-04-20 PROCEDURE — 84443 ASSAY THYROID STIM HORMONE: CPT

## 2024-04-20 PROCEDURE — 82805 BLOOD GASES W/O2 SATURATION: CPT

## 2024-04-20 PROCEDURE — 82962 GLUCOSE BLOOD TEST: CPT

## 2024-04-20 PROCEDURE — 83735 ASSAY OF MAGNESIUM: CPT

## 2024-04-20 PROCEDURE — 94660 CPAP INITIATION&MGMT: CPT

## 2024-04-20 PROCEDURE — 82607 VITAMIN B-12: CPT

## 2024-04-20 PROCEDURE — 82746 ASSAY OF FOLIC ACID SERUM: CPT

## 2024-04-20 PROCEDURE — 83550 IRON BINDING TEST: CPT

## 2024-04-20 PROCEDURE — 2580000003 HC RX 258

## 2024-04-20 PROCEDURE — 71045 X-RAY EXAM CHEST 1 VIEW: CPT

## 2024-04-20 PROCEDURE — 36600 WITHDRAWAL OF ARTERIAL BLOOD: CPT

## 2024-04-20 PROCEDURE — 0202U NFCT DS 22 TRGT SARS-COV-2: CPT

## 2024-04-20 RX ORDER — DULOXETIN HYDROCHLORIDE 30 MG/1
60 CAPSULE, DELAYED RELEASE ORAL DAILY
Status: DISCONTINUED | OUTPATIENT
Start: 2024-04-20 | End: 2024-04-22 | Stop reason: HOSPADM

## 2024-04-20 RX ORDER — HYDROCORTISONE 5 MG/1
10 TABLET ORAL DAILY
Status: DISCONTINUED | OUTPATIENT
Start: 2024-04-21 | End: 2024-04-21

## 2024-04-20 RX ORDER — DEXTROSE MONOHYDRATE 100 MG/ML
INJECTION, SOLUTION INTRAVENOUS CONTINUOUS PRN
Status: DISCONTINUED | OUTPATIENT
Start: 2024-04-20 | End: 2024-04-22 | Stop reason: HOSPADM

## 2024-04-20 RX ORDER — ARFORMOTEROL TARTRATE 15 UG/2ML
15 SOLUTION RESPIRATORY (INHALATION)
Status: DISCONTINUED | OUTPATIENT
Start: 2024-04-20 | End: 2024-04-22 | Stop reason: HOSPADM

## 2024-04-20 RX ORDER — IPRATROPIUM BROMIDE AND ALBUTEROL SULFATE 2.5; .5 MG/3ML; MG/3ML
1 SOLUTION RESPIRATORY (INHALATION)
Status: DISCONTINUED | OUTPATIENT
Start: 2024-04-20 | End: 2024-04-22 | Stop reason: HOSPADM

## 2024-04-20 RX ORDER — SODIUM CHLORIDE 0.9 % (FLUSH) 0.9 %
5-40 SYRINGE (ML) INJECTION PRN
Status: DISCONTINUED | OUTPATIENT
Start: 2024-04-20 | End: 2024-04-22 | Stop reason: HOSPADM

## 2024-04-20 RX ORDER — FUROSEMIDE 20 MG/1
20 TABLET ORAL DAILY
Status: DISCONTINUED | OUTPATIENT
Start: 2024-04-20 | End: 2024-04-22 | Stop reason: HOSPADM

## 2024-04-20 RX ORDER — DESMOPRESSIN ACETATE 0.1 MG/1
200 TABLET ORAL 2 TIMES DAILY
Status: DISCONTINUED | OUTPATIENT
Start: 2024-04-20 | End: 2024-04-22 | Stop reason: HOSPADM

## 2024-04-20 RX ORDER — SILDENAFIL CITRATE 20 MG/1
10 TABLET ORAL 3 TIMES DAILY
Status: DISCONTINUED | OUTPATIENT
Start: 2024-04-20 | End: 2024-04-22 | Stop reason: HOSPADM

## 2024-04-20 RX ORDER — INSULIN LISPRO 100 [IU]/ML
0-4 INJECTION, SOLUTION INTRAVENOUS; SUBCUTANEOUS NIGHTLY
Status: DISCONTINUED | OUTPATIENT
Start: 2024-04-20 | End: 2024-04-22 | Stop reason: HOSPADM

## 2024-04-20 RX ORDER — SODIUM CHLORIDE 9 MG/ML
INJECTION, SOLUTION INTRAVENOUS PRN
Status: DISCONTINUED | OUTPATIENT
Start: 2024-04-20 | End: 2024-04-22 | Stop reason: HOSPADM

## 2024-04-20 RX ORDER — OMEGA-3-ACID ETHYL ESTERS 1 G/1
2 CAPSULE, LIQUID FILLED ORAL 2 TIMES DAILY
Status: DISCONTINUED | OUTPATIENT
Start: 2024-04-20 | End: 2024-04-22 | Stop reason: HOSPADM

## 2024-04-20 RX ORDER — LEVOTHYROXINE SODIUM 0.05 MG/1
50 TABLET ORAL DAILY
Status: DISCONTINUED | OUTPATIENT
Start: 2024-04-20 | End: 2024-04-22 | Stop reason: HOSPADM

## 2024-04-20 RX ORDER — AMLODIPINE BESYLATE 5 MG/1
5 TABLET ORAL DAILY
Status: DISCONTINUED | OUTPATIENT
Start: 2024-04-20 | End: 2024-04-22 | Stop reason: HOSPADM

## 2024-04-20 RX ORDER — ACETAMINOPHEN 325 MG/1
650 TABLET ORAL EVERY 6 HOURS PRN
Status: DISCONTINUED | OUTPATIENT
Start: 2024-04-20 | End: 2024-04-22 | Stop reason: HOSPADM

## 2024-04-20 RX ORDER — LEVETIRACETAM 500 MG/1
500 TABLET ORAL 2 TIMES DAILY
Status: DISCONTINUED | OUTPATIENT
Start: 2024-04-20 | End: 2024-04-22 | Stop reason: HOSPADM

## 2024-04-20 RX ORDER — PANTOPRAZOLE SODIUM 40 MG/1
40 TABLET, DELAYED RELEASE ORAL
Status: DISCONTINUED | OUTPATIENT
Start: 2024-04-20 | End: 2024-04-22 | Stop reason: HOSPADM

## 2024-04-20 RX ORDER — ONDANSETRON 4 MG/1
4 TABLET, ORALLY DISINTEGRATING ORAL EVERY 8 HOURS PRN
Status: DISCONTINUED | OUTPATIENT
Start: 2024-04-20 | End: 2024-04-22 | Stop reason: HOSPADM

## 2024-04-20 RX ORDER — ACETAMINOPHEN 650 MG/1
650 SUPPOSITORY RECTAL EVERY 6 HOURS PRN
Status: DISCONTINUED | OUTPATIENT
Start: 2024-04-20 | End: 2024-04-22 | Stop reason: HOSPADM

## 2024-04-20 RX ORDER — SODIUM CHLORIDE 0.9 % (FLUSH) 0.9 %
5-40 SYRINGE (ML) INJECTION EVERY 12 HOURS SCHEDULED
Status: DISCONTINUED | OUTPATIENT
Start: 2024-04-20 | End: 2024-04-22 | Stop reason: HOSPADM

## 2024-04-20 RX ORDER — POLYETHYLENE GLYCOL 3350 17 G/17G
17 POWDER, FOR SOLUTION ORAL DAILY PRN
Status: DISCONTINUED | OUTPATIENT
Start: 2024-04-20 | End: 2024-04-22 | Stop reason: HOSPADM

## 2024-04-20 RX ORDER — POTASSIUM CHLORIDE 20 MEQ/1
20 TABLET, EXTENDED RELEASE ORAL DAILY
Status: DISCONTINUED | OUTPATIENT
Start: 2024-04-20 | End: 2024-04-22 | Stop reason: HOSPADM

## 2024-04-20 RX ORDER — HYDRALAZINE HYDROCHLORIDE 20 MG/ML
10 INJECTION INTRAMUSCULAR; INTRAVENOUS EVERY 6 HOURS PRN
Status: DISCONTINUED | OUTPATIENT
Start: 2024-04-20 | End: 2024-04-22 | Stop reason: HOSPADM

## 2024-04-20 RX ORDER — ONDANSETRON 2 MG/ML
4 INJECTION INTRAMUSCULAR; INTRAVENOUS EVERY 6 HOURS PRN
Status: DISCONTINUED | OUTPATIENT
Start: 2024-04-20 | End: 2024-04-22 | Stop reason: HOSPADM

## 2024-04-20 RX ORDER — LABETALOL HYDROCHLORIDE 5 MG/ML
10 INJECTION, SOLUTION INTRAVENOUS EVERY 6 HOURS PRN
Status: DISCONTINUED | OUTPATIENT
Start: 2024-04-20 | End: 2024-04-22 | Stop reason: HOSPADM

## 2024-04-20 RX ORDER — POTASSIUM CHLORIDE 20 MEQ/1
40 TABLET, EXTENDED RELEASE ORAL ONCE
Status: DISCONTINUED | OUTPATIENT
Start: 2024-04-20 | End: 2024-04-21

## 2024-04-20 RX ORDER — IPRATROPIUM BROMIDE AND ALBUTEROL SULFATE 2.5; .5 MG/3ML; MG/3ML
3 SOLUTION RESPIRATORY (INHALATION) ONCE
Status: COMPLETED | OUTPATIENT
Start: 2024-04-20 | End: 2024-04-20

## 2024-04-20 RX ORDER — MAGNESIUM SULFATE IN WATER 40 MG/ML
2000 INJECTION, SOLUTION INTRAVENOUS ONCE
Status: COMPLETED | OUTPATIENT
Start: 2024-04-20 | End: 2024-04-21

## 2024-04-20 RX ORDER — DIGOXIN 125 MCG
62.5 TABLET ORAL DAILY
Status: DISCONTINUED | OUTPATIENT
Start: 2024-04-20 | End: 2024-04-22 | Stop reason: HOSPADM

## 2024-04-20 RX ORDER — GLUCAGON 1 MG/ML
1 KIT INJECTION PRN
Status: DISCONTINUED | OUTPATIENT
Start: 2024-04-20 | End: 2024-04-22 | Stop reason: HOSPADM

## 2024-04-20 RX ORDER — BUDESONIDE 0.25 MG/2ML
250 INHALANT ORAL 2 TIMES DAILY
Status: DISCONTINUED | OUTPATIENT
Start: 2024-04-20 | End: 2024-04-22 | Stop reason: HOSPADM

## 2024-04-20 RX ORDER — INSULIN LISPRO 100 [IU]/ML
0-4 INJECTION, SOLUTION INTRAVENOUS; SUBCUTANEOUS
Status: DISCONTINUED | OUTPATIENT
Start: 2024-04-20 | End: 2024-04-22 | Stop reason: HOSPADM

## 2024-04-20 RX ORDER — VITAMIN B COMPLEX
1000 TABLET ORAL DAILY
Status: DISCONTINUED | OUTPATIENT
Start: 2024-04-20 | End: 2024-04-22 | Stop reason: HOSPADM

## 2024-04-20 RX ORDER — HYDROCORTISONE 5 MG/1
5 TABLET ORAL NIGHTLY
Status: DISCONTINUED | OUTPATIENT
Start: 2024-04-20 | End: 2024-04-21 | Stop reason: SDUPTHER

## 2024-04-20 RX ORDER — GUAIFENESIN 400 MG/1
400 TABLET ORAL 3 TIMES DAILY
Status: DISCONTINUED | OUTPATIENT
Start: 2024-04-20 | End: 2024-04-22 | Stop reason: HOSPADM

## 2024-04-20 RX ORDER — METOPROLOL SUCCINATE 50 MG/1
50 TABLET, EXTENDED RELEASE ORAL 2 TIMES DAILY
Status: DISCONTINUED | OUTPATIENT
Start: 2024-04-20 | End: 2024-04-22 | Stop reason: HOSPADM

## 2024-04-20 RX ADMIN — ACETAMINOPHEN 650 MG: 325 TABLET ORAL at 23:25

## 2024-04-20 RX ADMIN — ARFORMOTEROL TARTRATE 15 MCG: 15 SOLUTION RESPIRATORY (INHALATION) at 20:14

## 2024-04-20 RX ADMIN — GUAIFENESIN 400 MG: 400 TABLET ORAL at 23:17

## 2024-04-20 RX ADMIN — IPRATROPIUM BROMIDE AND ALBUTEROL SULFATE 3 DOSE: 2.5; .5 SOLUTION RESPIRATORY (INHALATION) at 13:11

## 2024-04-20 RX ADMIN — IPRATROPIUM BROMIDE AND ALBUTEROL SULFATE 1 DOSE: 2.5; .5 SOLUTION RESPIRATORY (INHALATION) at 20:14

## 2024-04-20 RX ADMIN — DESMOPRESSIN ACETATE 200 MCG: 0.1 TABLET ORAL at 23:15

## 2024-04-20 RX ADMIN — APIXABAN 5 MG: 5 TABLET, FILM COATED ORAL at 23:17

## 2024-04-20 RX ADMIN — LEVETIRACETAM 500 MG: 500 TABLET, FILM COATED ORAL at 23:16

## 2024-04-20 RX ADMIN — BUDESONIDE 250 MCG: 0.25 SUSPENSION RESPIRATORY (INHALATION) at 20:14

## 2024-04-20 RX ADMIN — OMEGA-3-ACID ETHYL ESTERS 2 G: 1 CAPSULE, LIQUID FILLED ORAL at 23:14

## 2024-04-20 RX ADMIN — SILDENAFIL 10 MG: 20 TABLET, FILM COATED ORAL at 23:14

## 2024-04-20 RX ADMIN — METOPROLOL SUCCINATE 50 MG: 50 TABLET, EXTENDED RELEASE ORAL at 23:16

## 2024-04-20 RX ADMIN — HYDROCORTISONE 5 MG: 5 TABLET ORAL at 23:15

## 2024-04-20 RX ADMIN — SODIUM CHLORIDE, PRESERVATIVE FREE 10 ML: 5 INJECTION INTRAVENOUS at 23:17

## 2024-04-20 RX ADMIN — MAGNESIUM SULFATE HEPTAHYDRATE 2000 MG: 40 INJECTION, SOLUTION INTRAVENOUS at 23:18

## 2024-04-20 ASSESSMENT — PAIN DESCRIPTION - ORIENTATION: ORIENTATION: LEFT

## 2024-04-20 ASSESSMENT — PAIN DESCRIPTION - LOCATION
LOCATION: RIB CAGE
LOCATION: HEAD

## 2024-04-20 ASSESSMENT — PAIN SCALES - GENERAL
PAINLEVEL_OUTOF10: 0
PAINLEVEL_OUTOF10: 3
PAINLEVEL_OUTOF10: 3

## 2024-04-20 ASSESSMENT — PAIN DESCRIPTION - DESCRIPTORS
DESCRIPTORS: ACHING
DESCRIPTORS: ACHING

## 2024-04-20 NOTE — ED PROVIDER NOTES
[facility]  EMERGENCY DEPARTMENT ENCOUNTER        Pt Name: Rebekah Rodriguez  MRN: [id number]  Birthdate 1955  Date of evaluation: 4/20/2024  Provider: Kamla Kirkland MD  Attending Provider: Garth Harkins MD  PCP: Lauro Ellington MD  Note Started: 12:55 PM EDT 4/20/24    CHIEF COMPLAINT       Chief Complaint   Patient presents with    Shortness of Breath     Started this am. Pt was 90% on her home CPAP. 125mg Solumedrol given in route. Pt recently released.        HISTORY OF PRESENT ILLNESS: 1 or more Elements   History From: the patient        Rebekah Rodriguez is a 68 y.o. female with a past medical history of COPD, paroxysmal atrial fibrillation, CHF, hypertension, hyperlipidemia presenting with shortness of breath.  Patient states that she was just recently discharged from the hospital for pneumonia.  After going home, she states that she had difficulty breathing.  She started using her home CPAP machine after taking her inhaler which did not improve her symptoms.  She called EMS.  She states that she has been taking her Eliquis.  She has not missed any doses.  Per EMS, patient was 90% on their arrival while the patient was on her CPAP machine.  They administered 125 mg of Solu-Medrol prior to arrival.     Nursing Notes were all reviewed and agreed with or any disagreements were addressed in the HPI.      REVIEW OF EXTERNAL NOTE :       Admission on 4/3/2024:       Hospital Course:     Rebekah Rodriguez is a 68 y.o. female chronically on 6L of O2 with PMHx of pulmonary hypertension, rectal prolapse, sarcoidosis, permanent A-fib on apixaban,Chronic HFpEF (EF 60%),  chronic hypoxic respiratory failure, ckd. Copd, PE, adrenal insufficiency on hydrocortisone, central diabetes insipidus who presents to the ED with SOB. Follows with pulmonology and seen last in clinic on feb 2024.  States has been compliant with her medications. Has been using bipap at nights and her home oxygen throughout the day. Patient

## 2024-04-20 NOTE — H&P
Guaifenesin.  Afebrile, no leukocytosis, no change in cough or sputum, no new infiltrates in chest x-ray.  No need for antibiotics at this time.  Check procalcitonin, respiratory culture, Legionella antigen and strep pneumo antigen.  Consider CT chest.  Continue noninvasive ventilation in the form of BiPAP.  Wean oxygen as tolerated.  Respiratory care team to evaluate and treat.  Consider repeating ABG.  Pulmonology consulted     Persistent atrial fibrillation on metoprolol succinate 50 mg twice daily, digoxin 62.5 mcg daily and Eliquis 5 mg twice daily  CHFpEF (ejection fraction 60%) on furosemide 20 mg daily  Hypertension on amlodipine 5 mg daily  Continue home medications as above  Daily weights and strict intake and output     Diabetes insipidus on desmopressin 200 mcg twice daily  Hypothyroidism on levothyroxine 50 mcg daily  Adrenal insufficiency on hydrocortisone 10 mg in the morning and 5 mg at night  Continue home medications as above  Low-dose sliding scale  Hypoglycemia protocol  Consider nephrology and/or endocrinology consult    Hypokalemia  Check magnesium level  Continue home potassium chloride 20 mEq daily  Replace as needed    Normocytic anemia  Iron deficiency  Check iron studies, TSH, vitamin B12, folate and peripheral blood smear  Monitor CBC     Chronic pain syndrome  Anxiety  Continue home duloxetine 60 mg daily    History of seizures   Continue home Keppra 500 mg twice daily        PT and OT consulted  DVT prophylaxis: Eliquis 5 mg twice daily  GI prophylaxis: Protonix 40 mg daily  Diet: N.p.o., will start regular diet after patient passes bed swallow evaluation  Full code  Telemetry      Geovanny Banda MD  Attending physician: Dr. Harkins

## 2024-04-21 LAB
ALBUMIN SERPL-MCNC: 3.7 G/DL (ref 3.5–5.2)
ALP SERPL-CCNC: 172 U/L (ref 35–104)
ALT SERPL-CCNC: 43 U/L (ref 0–32)
ANION GAP SERPL CALCULATED.3IONS-SCNC: 20 MMOL/L (ref 7–16)
AST SERPL-CCNC: 39 U/L (ref 0–31)
BASOPHILS # BLD: 0 K/UL (ref 0–0.2)
BASOPHILS NFR BLD: 0 % (ref 0–2)
BILIRUB SERPL-MCNC: 0.5 MG/DL (ref 0–1.2)
BUN SERPL-MCNC: 19 MG/DL (ref 6–23)
CALCIUM SERPL-MCNC: 9 MG/DL (ref 8.6–10.2)
CHLORIDE SERPL-SCNC: 98 MMOL/L (ref 98–107)
CO2 SERPL-SCNC: 21 MMOL/L (ref 22–29)
CREAT SERPL-MCNC: 0.9 MG/DL (ref 0.5–1)
EKG ATRIAL RATE: 340 BPM
EKG Q-T INTERVAL: 376 MS
EKG QRS DURATION: 84 MS
EKG QTC CALCULATION (BAZETT): 414 MS
EKG R AXIS: 97 DEGREES
EKG T AXIS: -124 DEGREES
EKG VENTRICULAR RATE: 73 BPM
EOSINOPHIL # BLD: 0 K/UL (ref 0.05–0.5)
EOSINOPHILS RELATIVE PERCENT: 0 % (ref 0–6)
ERYTHROCYTE [DISTWIDTH] IN BLOOD BY AUTOMATED COUNT: 14.2 % (ref 11.5–15)
GFR SERPL CREATININE-BSD FRML MDRD: 73 ML/MIN/1.73M2
GLUCOSE BLD-MCNC: 121 MG/DL (ref 74–99)
GLUCOSE BLD-MCNC: 122 MG/DL (ref 74–99)
GLUCOSE BLD-MCNC: 171 MG/DL (ref 74–99)
GLUCOSE BLD-MCNC: 99 MG/DL (ref 74–99)
GLUCOSE SERPL-MCNC: 101 MG/DL (ref 74–99)
HCT VFR BLD AUTO: 32.8 % (ref 34–48)
HGB BLD-MCNC: 10.2 G/DL (ref 11.5–15.5)
L PNEUMO1 AG UR QL IA.RAPID: NEGATIVE
LYMPHOCYTES NFR BLD: 0.13 K/UL (ref 1.5–4)
LYMPHOCYTES RELATIVE PERCENT: 6 % (ref 20–42)
MAGNESIUM SERPL-MCNC: 2.3 MG/DL (ref 1.6–2.6)
MCH RBC QN AUTO: 25.6 PG (ref 26–35)
MCHC RBC AUTO-ENTMCNC: 31.1 G/DL (ref 32–34.5)
MCV RBC AUTO: 82.4 FL (ref 80–99.9)
MONOCYTES NFR BLD: 0.1 K/UL (ref 0.1–0.95)
MONOCYTES NFR BLD: 4 % (ref 2–12)
NEUTROPHILS NFR BLD: 90 % (ref 43–80)
NEUTS SEG NFR BLD: 1.97 K/UL (ref 1.8–7.3)
PLATELET, FLUORESCENCE: 131 K/UL (ref 130–450)
PMV BLD AUTO: 11.8 FL (ref 7–12)
POTASSIUM SERPL-SCNC: 3.5 MMOL/L (ref 3.5–5)
PROT SERPL-MCNC: 6.6 G/DL (ref 6.4–8.3)
RBC # BLD AUTO: 3.98 M/UL (ref 3.5–5.5)
RBC # BLD: ABNORMAL 10*6/UL
S PNEUM AG SPEC QL: NEGATIVE
SODIUM SERPL-SCNC: 139 MMOL/L (ref 132–146)
SPECIMEN SOURCE: NORMAL
WBC OTHER # BLD: 2.2 K/UL (ref 4.5–11.5)

## 2024-04-21 PROCEDURE — 93010 ELECTROCARDIOGRAM REPORT: CPT | Performed by: INTERNAL MEDICINE

## 2024-04-21 PROCEDURE — 94640 AIRWAY INHALATION TREATMENT: CPT

## 2024-04-21 PROCEDURE — 2580000003 HC RX 258

## 2024-04-21 PROCEDURE — 94660 CPAP INITIATION&MGMT: CPT

## 2024-04-21 PROCEDURE — 82962 GLUCOSE BLOOD TEST: CPT

## 2024-04-21 PROCEDURE — 6360000002 HC RX W HCPCS

## 2024-04-21 PROCEDURE — 83735 ASSAY OF MAGNESIUM: CPT

## 2024-04-21 PROCEDURE — 6370000000 HC RX 637 (ALT 250 FOR IP)

## 2024-04-21 PROCEDURE — 85025 COMPLETE CBC W/AUTO DIFF WBC: CPT

## 2024-04-21 PROCEDURE — 36415 COLL VENOUS BLD VENIPUNCTURE: CPT

## 2024-04-21 PROCEDURE — 2140000000 HC CCU INTERMEDIATE R&B

## 2024-04-21 PROCEDURE — 80053 COMPREHEN METABOLIC PANEL: CPT

## 2024-04-21 PROCEDURE — 99223 1ST HOSP IP/OBS HIGH 75: CPT | Performed by: FAMILY MEDICINE

## 2024-04-21 RX ORDER — ALBUTEROL SULFATE 2.5 MG/3ML
2.5 SOLUTION RESPIRATORY (INHALATION) EVERY 6 HOURS PRN
Status: DISCONTINUED | OUTPATIENT
Start: 2024-04-21 | End: 2024-04-22 | Stop reason: HOSPADM

## 2024-04-21 RX ORDER — HYDROCORTISONE 20 MG/1
20 TABLET ORAL EVERY MORNING
Status: DISCONTINUED | OUTPATIENT
Start: 2024-04-21 | End: 2024-04-22 | Stop reason: HOSPADM

## 2024-04-21 RX ORDER — POTASSIUM CHLORIDE 7.45 MG/ML
10 INJECTION INTRAVENOUS
Status: COMPLETED | OUTPATIENT
Start: 2024-04-21 | End: 2024-04-21

## 2024-04-21 RX ORDER — HYDROCORTISONE 5 MG/1
10 TABLET ORAL DAILY
Status: DISCONTINUED | OUTPATIENT
Start: 2024-04-21 | End: 2024-04-21 | Stop reason: SDUPTHER

## 2024-04-21 RX ORDER — HYDROCORTISONE 5 MG/1
10 TABLET ORAL EVERY EVENING
Status: DISCONTINUED | OUTPATIENT
Start: 2024-04-21 | End: 2024-04-22 | Stop reason: HOSPADM

## 2024-04-21 RX ADMIN — LEVETIRACETAM 500 MG: 500 TABLET, FILM COATED ORAL at 19:33

## 2024-04-21 RX ADMIN — OMEGA-3-ACID ETHYL ESTERS 2 G: 1 CAPSULE, LIQUID FILLED ORAL at 19:25

## 2024-04-21 RX ADMIN — BUDESONIDE 250 MCG: 0.25 SUSPENSION RESPIRATORY (INHALATION) at 21:08

## 2024-04-21 RX ADMIN — GUAIFENESIN 400 MG: 400 TABLET ORAL at 13:09

## 2024-04-21 RX ADMIN — POTASSIUM CHLORIDE 10 MEQ: 10 INJECTION, SOLUTION INTRAVENOUS at 11:00

## 2024-04-21 RX ADMIN — SILDENAFIL 10 MG: 20 TABLET, FILM COATED ORAL at 13:07

## 2024-04-21 RX ADMIN — LEVOTHYROXINE SODIUM 50 MCG: 0.05 TABLET ORAL at 13:11

## 2024-04-21 RX ADMIN — IPRATROPIUM BROMIDE AND ALBUTEROL SULFATE 1 DOSE: 2.5; .5 SOLUTION RESPIRATORY (INHALATION) at 08:09

## 2024-04-21 RX ADMIN — IPRATROPIUM BROMIDE AND ALBUTEROL SULFATE 1 DOSE: 2.5; .5 SOLUTION RESPIRATORY (INHALATION) at 12:09

## 2024-04-21 RX ADMIN — METOPROLOL SUCCINATE 50 MG: 50 TABLET, EXTENDED RELEASE ORAL at 19:27

## 2024-04-21 RX ADMIN — METOPROLOL SUCCINATE 50 MG: 50 TABLET, EXTENDED RELEASE ORAL at 13:09

## 2024-04-21 RX ADMIN — GUAIFENESIN 400 MG: 400 TABLET ORAL at 19:26

## 2024-04-21 RX ADMIN — FUROSEMIDE 20 MG: 20 TABLET ORAL at 13:11

## 2024-04-21 RX ADMIN — POTASSIUM CHLORIDE 20 MEQ: 1500 TABLET, EXTENDED RELEASE ORAL at 13:09

## 2024-04-21 RX ADMIN — SILDENAFIL 10 MG: 20 TABLET, FILM COATED ORAL at 19:24

## 2024-04-21 RX ADMIN — IPRATROPIUM BROMIDE AND ALBUTEROL SULFATE 1 DOSE: 2.5; .5 SOLUTION RESPIRATORY (INHALATION) at 15:51

## 2024-04-21 RX ADMIN — LEVETIRACETAM 500 MG: 500 TABLET, FILM COATED ORAL at 13:11

## 2024-04-21 RX ADMIN — DULOXETINE HYDROCHLORIDE 60 MG: 30 CAPSULE, DELAYED RELEASE ORAL at 13:11

## 2024-04-21 RX ADMIN — ARFORMOTEROL TARTRATE 15 MCG: 15 SOLUTION RESPIRATORY (INHALATION) at 08:08

## 2024-04-21 RX ADMIN — Medication 1000 UNITS: at 13:09

## 2024-04-21 RX ADMIN — POTASSIUM CHLORIDE 10 MEQ: 10 INJECTION, SOLUTION INTRAVENOUS at 14:03

## 2024-04-21 RX ADMIN — ARFORMOTEROL TARTRATE 15 MCG: 15 SOLUTION RESPIRATORY (INHALATION) at 21:08

## 2024-04-21 RX ADMIN — DIGOXIN 62.5 MCG: 125 TABLET ORAL at 13:10

## 2024-04-21 RX ADMIN — WATER 40 MG: 1 INJECTION INTRAMUSCULAR; INTRAVENOUS; SUBCUTANEOUS at 19:24

## 2024-04-21 RX ADMIN — DESMOPRESSIN ACETATE 200 MCG: 0.1 TABLET ORAL at 13:08

## 2024-04-21 RX ADMIN — OMEGA-3-ACID ETHYL ESTERS 2 G: 1 CAPSULE, LIQUID FILLED ORAL at 13:07

## 2024-04-21 RX ADMIN — APIXABAN 5 MG: 5 TABLET, FILM COATED ORAL at 19:27

## 2024-04-21 RX ADMIN — AMLODIPINE BESYLATE 5 MG: 5 TABLET ORAL at 13:09

## 2024-04-21 RX ADMIN — SODIUM CHLORIDE, PRESERVATIVE FREE 10 ML: 5 INJECTION INTRAVENOUS at 19:27

## 2024-04-21 RX ADMIN — DESMOPRESSIN ACETATE 200 MCG: 0.1 TABLET ORAL at 19:25

## 2024-04-21 RX ADMIN — HYDROCORTISONE 20 MG: 5 TABLET ORAL at 13:15

## 2024-04-21 RX ADMIN — HYDROCORTISONE 10 MG: 5 TABLET ORAL at 19:30

## 2024-04-21 RX ADMIN — BUDESONIDE 250 MCG: 0.25 SUSPENSION RESPIRATORY (INHALATION) at 08:07

## 2024-04-21 RX ADMIN — APIXABAN 5 MG: 5 TABLET, FILM COATED ORAL at 13:10

## 2024-04-21 RX ADMIN — WATER 40 MG: 1 INJECTION INTRAMUSCULAR; INTRAVENOUS; SUBCUTANEOUS at 13:08

## 2024-04-21 RX ADMIN — IPRATROPIUM BROMIDE AND ALBUTEROL SULFATE 1 DOSE: 2.5; .5 SOLUTION RESPIRATORY (INHALATION) at 21:08

## 2024-04-21 NOTE — PLAN OF CARE
Problem: Chronic Conditions and Co-morbidities  Goal: Patient's chronic conditions and co-morbidity symptoms are monitored and maintained or improved  4/21/2024 0951 by Cindi Mosley RN  Outcome: Progressing  4/21/2024 0951 by Cindi Mosley RN  Outcome: Progressing  4/21/2024 0107 by José Manuel Ken RN  Outcome: Progressing  Flowsheets (Taken 4/20/2024 2000)  Care Plan - Patient's Chronic Conditions and Co-Morbidity Symptoms are Monitored and Maintained or Improved: Monitor and assess patient's chronic conditions and comorbid symptoms for stability, deterioration, or improvement     Problem: Discharge Planning  Goal: Discharge to home or other facility with appropriate resources  4/21/2024 0951 by Cindi Mosley RN  Outcome: Progressing  4/21/2024 0951 by Cindi Mosley RN  Outcome: Progressing  4/21/2024 0107 by José Manuel Ken RN  Outcome: Progressing  Flowsheets  Taken 4/21/2024 0106  Discharge to home or other facility with appropriate resources:   Identify barriers to discharge with patient and caregiver   Identify discharge learning needs (meds, wound care, etc)  Taken 4/20/2024 2000  Discharge to home or other facility with appropriate resources:   Identify barriers to discharge with patient and caregiver   Identify discharge learning needs (meds, wound care, etc)     Problem: Pain  Goal: Verbalizes/displays adequate comfort level or baseline comfort level  4/21/2024 0951 by Cindi Mosley RN  Outcome: Progressing  4/21/2024 0107 by José Manuel Ken RN  Outcome: Progressing  Flowsheets  Taken 4/20/2024 2355  Verbalizes/displays adequate comfort level or baseline comfort level:   Encourage patient to monitor pain and request assistance   Assess pain using appropriate pain scale  Taken 4/20/2024 2139  Verbalizes/displays adequate comfort level or baseline comfort level:   Encourage patient to monitor pain and request assistance   Assess pain using appropriate pain scale     Problem: Skin/Tissue

## 2024-04-21 NOTE — CONSULTS
substantial portion of medical decision making (>50%) related to this patient encounter.  The medications & laboratory data and imagery were discussed and adjusted where necessary. Key issues of the case were discussed among consultants.  Review of CNP documentation was conducted and revisions were made as appropriate. I agree with the above documented exam, problem list and plan of care .    Joseph Agarwal MD

## 2024-04-22 VITALS
DIASTOLIC BLOOD PRESSURE: 78 MMHG | WEIGHT: 135.8 LBS | HEIGHT: 60 IN | HEART RATE: 81 BPM | RESPIRATION RATE: 20 BRPM | BODY MASS INDEX: 26.66 KG/M2 | OXYGEN SATURATION: 98 % | TEMPERATURE: 98.3 F | SYSTOLIC BLOOD PRESSURE: 138 MMHG

## 2024-04-22 PROBLEM — J44.1 COPD EXACERBATION (HCC): Status: RESOLVED | Noted: 2024-04-20 | Resolved: 2024-04-22

## 2024-04-22 LAB
ALBUMIN SERPL-MCNC: 3.8 G/DL (ref 3.5–5.2)
ALP SERPL-CCNC: 164 U/L (ref 35–104)
ALT SERPL-CCNC: 39 U/L (ref 0–32)
ANION GAP SERPL CALCULATED.3IONS-SCNC: 12 MMOL/L (ref 7–16)
AST SERPL-CCNC: 30 U/L (ref 0–31)
BASOPHILS # BLD: 0 K/UL (ref 0–0.2)
BASOPHILS NFR BLD: 0 % (ref 0–2)
BILIRUB SERPL-MCNC: 0.2 MG/DL (ref 0–1.2)
BUN SERPL-MCNC: 30 MG/DL (ref 6–23)
CALCIUM SERPL-MCNC: 9.2 MG/DL (ref 8.6–10.2)
CHLORIDE SERPL-SCNC: 103 MMOL/L (ref 98–107)
CO2 SERPL-SCNC: 27 MMOL/L (ref 22–29)
CREAT SERPL-MCNC: 1.1 MG/DL (ref 0.5–1)
EOSINOPHIL # BLD: 0 K/UL (ref 0.05–0.5)
EOSINOPHILS RELATIVE PERCENT: 0 % (ref 0–6)
ERYTHROCYTE [DISTWIDTH] IN BLOOD BY AUTOMATED COUNT: 14.5 % (ref 11.5–15)
GFR SERPL CREATININE-BSD FRML MDRD: 56 ML/MIN/1.73M2
GLUCOSE BLD-MCNC: 119 MG/DL (ref 74–99)
GLUCOSE BLD-MCNC: 98 MG/DL (ref 74–99)
GLUCOSE SERPL-MCNC: 105 MG/DL (ref 74–99)
HCT VFR BLD AUTO: 33.4 % (ref 34–48)
HGB BLD-MCNC: 10.4 G/DL (ref 11.5–15.5)
LYMPHOCYTES NFR BLD: 0.22 K/UL (ref 1.5–4)
LYMPHOCYTES RELATIVE PERCENT: 10 % (ref 20–42)
MCH RBC QN AUTO: 25.8 PG (ref 26–35)
MCHC RBC AUTO-ENTMCNC: 31.1 G/DL (ref 32–34.5)
MCV RBC AUTO: 82.9 FL (ref 80–99.9)
MONOCYTES NFR BLD: 0.06 K/UL (ref 0.1–0.95)
MONOCYTES NFR BLD: 3 % (ref 2–12)
NEUTROPHILS NFR BLD: 88 % (ref 43–80)
NEUTS SEG NFR BLD: 2.02 K/UL (ref 1.8–7.3)
NUCLEATED RED BLOOD CELLS: 1 PER 100 WBC
PATH REV BLD -IMP: NORMAL
PLATELET # BLD AUTO: 168 K/UL (ref 130–450)
PMV BLD AUTO: 11.8 FL (ref 7–12)
POTASSIUM SERPL-SCNC: 4.3 MMOL/L (ref 3.5–5)
PROT SERPL-MCNC: 6.4 G/DL (ref 6.4–8.3)
RBC # BLD AUTO: 4.03 M/UL (ref 3.5–5.5)
RBC # BLD: ABNORMAL 10*6/UL
SODIUM SERPL-SCNC: 142 MMOL/L (ref 132–146)
WBC OTHER # BLD: 2.3 K/UL (ref 4.5–11.5)

## 2024-04-22 PROCEDURE — 6360000002 HC RX W HCPCS

## 2024-04-22 PROCEDURE — 2580000003 HC RX 258

## 2024-04-22 PROCEDURE — 6370000000 HC RX 637 (ALT 250 FOR IP)

## 2024-04-22 PROCEDURE — 97161 PT EVAL LOW COMPLEX 20 MIN: CPT

## 2024-04-22 PROCEDURE — 94640 AIRWAY INHALATION TREATMENT: CPT

## 2024-04-22 PROCEDURE — 97530 THERAPEUTIC ACTIVITIES: CPT

## 2024-04-22 PROCEDURE — 99238 HOSP IP/OBS DSCHRG MGMT 30/<: CPT | Performed by: FAMILY MEDICINE

## 2024-04-22 PROCEDURE — 82962 GLUCOSE BLOOD TEST: CPT

## 2024-04-22 PROCEDURE — 85025 COMPLETE CBC W/AUTO DIFF WBC: CPT

## 2024-04-22 PROCEDURE — 94660 CPAP INITIATION&MGMT: CPT

## 2024-04-22 PROCEDURE — 36415 COLL VENOUS BLD VENIPUNCTURE: CPT

## 2024-04-22 PROCEDURE — 80053 COMPREHEN METABOLIC PANEL: CPT

## 2024-04-22 RX ORDER — PREDNISONE 10 MG/1
40 TABLET ORAL DAILY
Qty: 5 TABLET | Refills: 0 | Status: SHIPPED | OUTPATIENT
Start: 2024-04-22 | End: 2024-04-27

## 2024-04-22 RX ADMIN — DULOXETINE HYDROCHLORIDE 60 MG: 30 CAPSULE, DELAYED RELEASE ORAL at 08:24

## 2024-04-22 RX ADMIN — IPRATROPIUM BROMIDE AND ALBUTEROL SULFATE 1 DOSE: 2.5; .5 SOLUTION RESPIRATORY (INHALATION) at 08:04

## 2024-04-22 RX ADMIN — SILDENAFIL 10 MG: 20 TABLET, FILM COATED ORAL at 08:26

## 2024-04-22 RX ADMIN — PANTOPRAZOLE SODIUM 40 MG: 40 TABLET, DELAYED RELEASE ORAL at 05:54

## 2024-04-22 RX ADMIN — LEVOTHYROXINE SODIUM 50 MCG: 0.05 TABLET ORAL at 08:37

## 2024-04-22 RX ADMIN — DIGOXIN 62.5 MCG: 125 TABLET ORAL at 08:25

## 2024-04-22 RX ADMIN — ARFORMOTEROL TARTRATE 15 MCG: 15 SOLUTION RESPIRATORY (INHALATION) at 08:04

## 2024-04-22 RX ADMIN — WATER 40 MG: 1 INJECTION INTRAMUSCULAR; INTRAVENOUS; SUBCUTANEOUS at 08:27

## 2024-04-22 RX ADMIN — GUAIFENESIN 400 MG: 400 TABLET ORAL at 08:22

## 2024-04-22 RX ADMIN — BUDESONIDE 250 MCG: 0.25 SUSPENSION RESPIRATORY (INHALATION) at 08:04

## 2024-04-22 RX ADMIN — Medication 1000 UNITS: at 08:22

## 2024-04-22 RX ADMIN — DESMOPRESSIN ACETATE 200 MCG: 0.1 TABLET ORAL at 08:24

## 2024-04-22 RX ADMIN — POTASSIUM CHLORIDE 20 MEQ: 1500 TABLET, EXTENDED RELEASE ORAL at 08:22

## 2024-04-22 RX ADMIN — ACETAMINOPHEN 650 MG: 325 TABLET ORAL at 08:23

## 2024-04-22 RX ADMIN — SODIUM CHLORIDE, PRESERVATIVE FREE 10 ML: 5 INJECTION INTRAVENOUS at 08:37

## 2024-04-22 RX ADMIN — IPRATROPIUM BROMIDE AND ALBUTEROL SULFATE 1 DOSE: 2.5; .5 SOLUTION RESPIRATORY (INHALATION) at 12:16

## 2024-04-22 RX ADMIN — APIXABAN 5 MG: 5 TABLET, FILM COATED ORAL at 08:22

## 2024-04-22 RX ADMIN — OMEGA-3-ACID ETHYL ESTERS 2 G: 1 CAPSULE, LIQUID FILLED ORAL at 08:26

## 2024-04-22 RX ADMIN — LEVETIRACETAM 500 MG: 500 TABLET, FILM COATED ORAL at 08:22

## 2024-04-22 RX ADMIN — HYDROCORTISONE 20 MG: 5 TABLET ORAL at 08:27

## 2024-04-22 RX ADMIN — AMLODIPINE BESYLATE 5 MG: 5 TABLET ORAL at 08:23

## 2024-04-22 RX ADMIN — METOPROLOL SUCCINATE 50 MG: 50 TABLET, EXTENDED RELEASE ORAL at 08:24

## 2024-04-22 RX ADMIN — FUROSEMIDE 20 MG: 20 TABLET ORAL at 08:33

## 2024-04-22 ASSESSMENT — PAIN DESCRIPTION - DESCRIPTORS: DESCRIPTORS: ACHING;SORE;NUMBNESS

## 2024-04-22 ASSESSMENT — PAIN SCALES - GENERAL: PAINLEVEL_OUTOF10: 9

## 2024-04-22 ASSESSMENT — PAIN DESCRIPTION - LOCATION: LOCATION: BACK

## 2024-04-22 NOTE — CARE COORDINATION
Social Work/ Case Management Transition of Care Planning (Misty Norman, JIGAR [phone]):     Discharge order noted, Pt's  to transport and will need to bring portable O2 take.   JIGAR Leigh  4/22/2024

## 2024-04-22 NOTE — CARE COORDINATION
Social Work/ Case Management Transition of Care Planning (Misty Norman, [phone]):     Pt presented to the hospital with SOB. SW met with Pt who states she lives with her  in a one story house with ramp access to enter. Pt states she can drive but hasn't in a year, and does not work. Pt states her PCP is Dr. Ellington and pharmacy is [facility]. Pt states she is active with [facility] and SW verified with Keena that they will continue care after discharge. Pt states she has a WC, BSC, SC also home O2 and CPAP through [employer], SW waiting to hear back from Wolfgang with [employer] to verify levels. Pt states she needed help with most ADL's prior to this admission. Pt plans to return home with [facility] and her  will transport her home.   Misty Norman, [facility]  4/22/2024    Readmission Assessment  Number of Days since last admission?: 8-30 days  Previous Disposition: Home with Home Health  Who is being Interviewed: Patient  What was the patient's/caregiver's perception as to why they think they needed to return back to the hospital?: Other (Comment) (Symptoms worsened)  Did you visit your Primary Care Physician after you left the hospital, before you returned this time?: No  Why weren't you able to visit your PCP?: Did not have an appointment  Did you see a specialist, such as Cardiac, Pulmonary, Orthopedic Physician, etc. after you left the hospital?: No  Who advised the patient to return to the hospital?: Self-referral  Does the patient report anything that got in the way of taking their medications?: No  In our efforts to provide the best possible care to you and others like you, can you think of anything that we could have done to help you after you left the hospital the first time, so that you might not have needed to return so soon?: Additional Community resources available for illness support    The Plan for Transition of Care is related to the following treatment goals:     The Patient and/or patient

## 2024-04-22 NOTE — DISCHARGE INSTR - DIET

## 2024-04-22 NOTE — PROGRESS NOTES
Date: 4/20/2024    Time: 2:40 PM    Patient Placed On BIPAP/CPAP/ Non-Invasive Ventilation?  Yes    If no must comment.  Facial area red/color change? No           If YES are Blister/Lesion present?No   If yes must notify nursing staff  BIPAP/CPAP skin barrier?  Yes    Skin barrier type:mepilexlite       Comments:        Patricia Simerlink, RCP  
Date: 4/20/2024    Time: 8:26 PM    Patient Placed On BIPAP/CPAP/ Non-Invasive Ventilation?  No    If no must comment.  Facial area red/color change? {YES / NO:19727}           If YES are Blister/Lesion present?{YES / NO:19727}   If yes must notify nursing staff  BIPAP/CPAP skin barrier?  {YES / NO:19727}    Skin barrier type:{Lonnie:51273::\"other***\",\"mepilexlite\",\"duoderm\",\"gelpad\",\"Liquicel\"}       Comments:        Maame Coelho RCP  
PT stated when RN rounded that she would like to continue to sleep in her room, eat later on, and take her medications in the afternoon.     PT resting comfortably in her room. And education was done on the call light.   
[facility]  Family Medicine Attending    Hospital Course: 68 y.o. female with history of pulmonary hypertension, rectal prolapse, sarcoidosis, permanent A-fib on apixaban, Chronic HFpEF (EF 60%), COPD with chronic hypoxia requiring 6 L of oxygen at home during the night on BiPAP at night, chronic kidney disease, pulmonary embolism, adrenal insufficiency on hydrocortisone and central diabetes insipidus who presents to the emergency department for shortness of breath. Placed on BIPAP in ER given solumedrol, duonebs     S: She did not report overnight events but didn't want to take up on rounds. She tolerated BIPAP well overnight.     O: VS- Blood pressure (!) 130/97, pulse 72, temperature 97.3 °F (36.3 °C), temperature source Axillary, resp. rate 20, height 1.524 m (5'), weight 57 kg (125 lb 9.6 oz), SpO2 90 %, not currently breastfeeding.  Exam is as noted by resident with the following changes, additions or corrections:  Gen: NAD, AAO  HEENT BIPAP in place  CV distant, irreg (afib on monitor)  Resp: diminished bilat.   Abd chronic hernia  Skin: No decub wound on exam 4/21.   Ext: LESLIE=    Impressions:   Principal Problem:    COPD exacerbation (HCC)  Resolved Problems:    * No resolved hospital problems. *      Plan:     1) Acute on Chronic hypoxic resp failure - BIPAP, duoneb, pulmicort, brovana, solumedrol, pulm C/S. Abx considered but held so far.     2) Afib, CHFpEF, HTN - cont home meds, Ios daily wts.     3) DI - monitor sodium    4) DM - LDSS    See resident note for further details.          Attending Physician Statement  I have reviewed the chart and seen the patient with the resident(s).  I personally reviewed images, EKG's and similar tests, if present.  I personally reviewed and performed key elements of the history and exam.  I have reviewed and confirmed the Family Medicine admitting team resident history and exam with changes as indicated above.  I agree with the assessment, plan, and 
[facility] - Family Medicine Inpatient   Resident Progress Note    S:  Hospital day: 1    Brief Synopsis: Rebekah Rodriguez is a 68 y.o. female with a past medical history of pulmonary hypertension, rectal prolapse, sarcoidosis, permanent A-fib on apixaban, Chronic HFpEF (EF 60%), COPD with chronic hypoxia requiring 6 L of oxygen at home during the night on BiPAP at night, chronic kidney disease, pulmonary embolism, adrenal insufficiency on hydrocortisone and central diabetes insipidus who presents to the emergency department for shortness of breath.  Tachypneic, hypertensive and hypoxic at presentation.  Afebrile.  Placed on BiPAP.  Labs remarkable for hypokalemia, hypochloremia, proBNP 6019, troponin 19 --> 14,, elevated alkaline phosphatase, elevated ALT, elevated AST, normocytic and hypochromic anemia.  Respiratory panel negative.  ABG remarkable for normal pH and PCO2.  Chest x-ray negative for any acute process.  Right base infiltrate unchanged.  EKG remarkable for right axis deviations, atrial fibrillation and normal Qtc.  Patient admitted for acute hypoxic respiratory failure.    No acute events overnight.  Patient was seen and examined this morning.  Feels better.       Cont meds:    sodium chloride      dextrose       Scheduled meds:    hydrocortisone  10 mg Oral Daily    potassium chloride  40 mEq Oral Once    amLODIPine  5 mg Oral Daily    arformoterol tartrate  15 mcg Nebulization BID RT    budesonide  250 mcg Nebulization BID    DESMOpressin  200 mcg Oral BID    digoxin  62.5 mcg Oral Daily    DULoxetine  60 mg Oral Daily    apixaban  5 mg Oral BID    pantoprazole  40 mg Oral QAM AC    furosemide  20 mg Oral Daily    guaiFENesin  400 mg Oral TID    hydrocortisone  5 mg Oral Nightly    levETIRAcetam  500 mg Oral BID    levothyroxine  50 mcg Oral Daily    metoprolol succinate  50 mg Oral BID    omega-3 acid ethyl esters  2 g Oral BID    potassium chloride  20 mEq Oral Daily    sildenafil  10 mg Oral TID    
potassium chloride  20 mEq Oral Daily    sildenafil  10 mg Oral TID    Vitamin D  1,000 Units Oral Daily    sodium chloride flush  5-40 mL IntraVENous 2 times per day    methylPREDNISolone  40 mg IntraVENous Q12H    ipratropium 0.5 mg-albuterol 2.5 mg  1 Dose Inhalation Q4H WA RT    insulin lispro  0-4 Units SubCUTAneous TID WC    insulin lispro  0-4 Units SubCUTAneous Nightly     PRN meds: albuterol, sodium chloride flush, sodium chloride, ondansetron **OR** ondansetron, polyethylene glycol, acetaminophen **OR** acetaminophen, glucose, dextrose bolus **OR** dextrose bolus, glucagon (rDNA), dextrose, hydrALAZINE, labetalol     I reviewed the patient's Past Medical and Surgical History, Medications and Allergies.    O:  VS: /78   Pulse 81   Temp 97.7 °F (36.5 °C) (Tympanic)   Resp 20   Ht 1.524 m (5')   Wt 61.6 kg (135 lb 12.9 oz)   LMP  (LMP Unknown)   SpO2 98%   BMI 26.52 kg/m²     Physical Exam:  Physical Exam  Vitals reviewed.   Constitutional:       General: She is not in acute distress.  Eyes:      Conjunctiva/sclera: Conjunctivae normal.   Cardiovascular:      Rate and Rhythm: Normal rate. Rhythm irregular.      Pulses: Normal pulses.      Heart sounds: Normal heart sounds.   Pulmonary:      Comments: Decreased breath sounds  Abdominal:      General: Bowel sounds are normal. There is no distension.      Palpations: Abdomen is soft.      Tenderness: There is no abdominal tenderness.   Musculoskeletal:      Right lower leg: No edema.      Left lower leg: No edema.   Skin:     General: Skin is warm.      Capillary Refill: Capillary refill takes less than 2 seconds.      Findings: No rash.   Neurological:      General: No focal deficit present.      Mental Status: She is alert and oriented to person, place, and time.   Psychiatric:         Thought Content: Thought content normal.         Judgment: Judgment normal.            Labs:  Na/K/Cl/CO2:  142/4.3/103/27 (04/22 
reviewed the chart and seen the patient with the resident(s).  I personally reviewed images, EKG's and similar tests, if present.  I personally reviewed and performed key elements of the history and exam.  I have reviewed and confirmed the Family Medicine admitting team resident history and exam with changes as indicated above.  I agree with the assessment, plan, and orders as documented by the  admitting team resident Dr Ludwig/Solo.  Please refer to the resident progress note today and admission history and physical  for additional information.      Umer Johnson MD        
04:43 AM    MCHC 31.1 04/22/2024 04:43 AM    RDW 14.5 04/22/2024 04:43 AM     04/22/2024 04:43 AM    MPV 11.8 04/22/2024 04:43 AM     Lab Results   Component Value Date/Time     04/22/2024 04:43 AM    K 4.3 04/22/2024 04:43 AM    K 3.8 07/14/2023 04:12 AM     04/22/2024 04:43 AM    CO2 27 04/22/2024 04:43 AM    BUN 30 04/22/2024 04:43 AM    CREATININE 1.1 04/22/2024 04:43 AM    CALCIUM 9.2 04/22/2024 04:43 AM    GFRAA >60 10/04/2022 06:02 AM    LABGLOM 56 04/22/2024 04:43 AM     Lab Results   Component Value Date/Time    PROTIME 12.4 01/31/2024 04:23 AM    PROTIME 12.7 05/02/2012 07:40 PM    INR 1.1 01/31/2024 04:23 AM     Recent Labs     04/20/24  1316   PROBNP 6,019*       Recent Labs     04/20/24  1833   PROCAL 0.12*        Assessment:    Chronic respiratory failure with hypoxia and hypercapnia  Gold stage III severe COPD with exacerbation  LINDSAY on home BiPAP  Severe pulmonary hypertension pre and postcapillary on sildenafil.  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  Secondary adrenal insufficiency chronic Cortef dosing  Diabetes insipidus on DDAVP  History of PEA arrest August 2023  Pulmonary sarcoidosis      Plan:   Wean oxygen as tolerated to patients baseline of 3-4 liters NC  ABG as needed  NIV in the form of BiPAP with home settings of 14/6, BUR 16  follows Nephrology outpatient for management of diabetes insipidus, on chronic DDAVP  Chronic Cortef dosing for adrenal insufficiency  Baseline Lasix dose 20 mg by mouth daily  Monitor off antibiotics  Transition steroids to Prednisone 40 mg daily x 5 days-orders written for discharge  Guaifenesin 3 times daily  incentive spirometer, flutter valve  Scheduled bronchodilators-Brovana and Pulmicort twice daily, DuoNebs every 4 hours while awake. Albuterol nebs prn  Sildenafil 3 times daily-follows at [facility]  Chest x-ray reviewed.  
respiratory failure with hypoxia (HCC) [J96.01]  Specific instructions for next treatment:  Sit EOB, postural exercises and Transfer to bedside chair  Current Treatment Recommendations:     [x] Strengthening to improve independence with functional mobility   [x] ROM to improve independence with functional mobility   [x] Balance Training to improve static/dynamic balance and to reduce fall risk  [x] Endurance Training to improve activity tolerance during functional mobility   [x] Transfer Training to improve safety and independence with all functional transfers   [x] Gait Training to improve gait mechanics, endurance and asses need for appropriate assistive device  [x] Stair Training in preparation for safe discharge home and/or into the community when appropriate  [] Positioning to prevent skin breakdown and contractures  [x] Safety and Education Training   [] Patient/Caregiver Education   [] HEP  [] Gait Team to be added to POC  [] Other     PT long term treatment goals are located in above grid    Frequency of treatments: 2-5x/week x 1-2 weeks.    Time in  1130  Time out  1155    Total Treatment Time  10 minutes     Evaluation Time includes thorough review of current medical information, gathering information on past medical history/social history and prior level of function, completion of standardized testing/informal observation of tasks, assessment of data and education on plan of care and goals.    CPT codes:  [x] Low Complexity PT evaluation 08815  [] Moderate Complexity PT evaluation 62957  [] High Complexity PT evaluation 40196  [] PT Re-evaluation 84791  [] Gait training 17785 - minutes  [] Manual therapy 38862  minutes  [x] Therapeutic activities 04710 -10 minutes  [] Therapeutic exercises 14549 - minutes  [] Neuromuscular reeducation 07532 minutes     Ori Sepulveda, PT [id number]

## 2024-04-22 NOTE — ACP (ADVANCE CARE PLANNING)
Advance Care Planning   The patient has the following advanced directives on file:  Advance Directives       Power of  Living Will ACP-Advance Directive ACP-Power of     Not on File Not on File Filed Not on File            The patient has appointed the following active healthcare agents:    Primary Decision Maker: Eric Rodriguez - Spouse - [phone]    Secondary Decision Maker: IRINABONYELIER - Child - [phone]    The Patient has the following current code status:    Code Status: Full Code      Misty Norman, JIGAR  4/22/2024

## 2024-04-22 NOTE — DISCHARGE INSTRUCTIONS
DISCHARGE INSTRUCTIONS - FAMILY MEDICINE    Follow up at the [facility] ([facility]) on Visit date not found. If there are any issues and cannot present to your appointment, please contact us at [phone] and we will schedule a new appointment for you.     Future Appointments   Date Time Provider Department Center   4/29/2024  1:00 PM Bobby Swan MD [facility]   5/20/2024  2:30 PM Lauro Gant APRN - CNS [facility]   10/1/2024  2:40 PM Josemanuel Medrano APRN - CNS BDM ENDO [facility]   11/4/2024  2:40 PM Joya Garcia MD [facility]:  [address] [address]         Phone: [phone]    Once discharged from [facility], you can :    Return to work : N/A  Activity : activity as tolerated  Stairs : As tolerated  Exercise : As tolerated  Lifting : As tolerated   Sexual activity : Yes  Driving : With seat belt on. NO driving on narcotic pain medication if prescribed   Medications : Always take your medications as prescribed  Wound Care: none needed  Diet : You are asked to make an attempt to improve diet and exercise patterns to aid in medical management of your medical condition/problem.    Call [facility] with any further questions. Return to Emergency Department with any worsening of your condition and/or fever greater than 101 degrees, new weakness, shortness of breath or chest pain.  ______________________________________________________________________    =====================================   [facility]   DISCHARGE INSTRUCTIONS   =====================================   Take your medications as directed in this summary     Follow up with all your future appointments as advised   Call [phone] to confirm your appointment with [facility] ([facility]) as soon as possible and/or if there are any appointment changes or other issues.     It is important that you follow

## 2024-04-23 NOTE — DISCHARGE SUMMARY
heart failure, unspecified HF chronicity, unspecified heart failure type (HCC)    Generalized weakness    Hypopituitarism (HCC)    Secondary adrenal insufficiency (HCC)    Permanent atrial fibrillation (HCC)    Rectal prolapse    Heart failure (HCC)    Gastro-esophageal reflux disease without esophagitis    Congestive heart failure of unknown etiology (HCC)       Hospital Course:     Rebekah Rodriguez is a 68 y.o. female who presented to the ED for shortness of breath. Onset few days prior to admission, she has been using BiPAP more frequently. Tachypneic, hypertensive and hypoxic at presentation.  Labs remarkable for hypokalemia, hypochloremia, proBNP 6019, troponin 19 --> 14,, elevated alkaline phosphatase, elevated ALT, elevated AST, normocytic and hypochromic anemia.  Respiratory panel negative.  ABG remarkable for normal pH and PCO2.  Chest x-ray negative for any acute process.  Right base infiltrate unchanged.  EKG remarkable for right axis deviations, atrial fibrillation and normal Qtc.  Patient admitted for acute hypoxic respiratory failure.     Pulmonology was consulted and started patient on solumedrol 40mg BID and transitioned to PO prednisone. She was given DuoNeb, Pulmicort and Brovana while inpatient. She did not require any antibiotics. Procalcitonin 0.12.  Respiratory culture pending.  Legionella antigen negative.  Strep pneumo antigen negative. O2 back to baseline. She did complaint of bilateral shoulder pain but pain controlled with Tylenol, just took longer for it to take effect.    The patient's course was otherwise uneventful. She progressed well, pain was controlled on PO medications. She was tolerating a regular diet with no nausea or vomiting, and was in a suitable condition for discharge to home.    Discharge plan:  AHRF - continue 6L NC at home with BiPAP as needed. Continue with breathing treatments. Continue with prednisone burst for 5 days total.  Bilateral shoulder pain - to be evaluated

## 2024-04-25 ENCOUNTER — TELEPHONE (OUTPATIENT)
Dept: FAMILY MEDICINE CLINIC | Age: 69
End: 2024-04-25

## 2024-04-25 NOTE — TELEPHONE ENCOUNTER
Care Transitions Initial Follow Up Call    Outreach made within 2 business days of discharge: Yes    Patient: eRbekah Rodriguez Patient : 1955   MRN: [id number]  Reason for Admission: There are no discharge diagnoses documented for the most recent discharge.  Discharge Date: 24       Spoke with: message left for patient    Discharge department/facility: [facility]  Attempted to reach for TCM follow  up. Message left requesting return call and reminding of appt on 24    Scheduled appointment with PCP within 7-14 days    Follow Up  Future Appointments   Date Time Provider Department Center   2024  1:40 PM Bobby Swan MD [facility]   2024  2:30 PM Lauro Gant APRN - CNS [facility] PULMON[facility]   10/1/2024  2:40 PM Josemanuel Medrano APRN - CNS BDM ENDO [facility]   2024  2:40 PM Joya Garcia MD [place] [facility]       Goran Squires RN

## 2024-04-26 ENCOUNTER — TELEPHONE (OUTPATIENT)
Dept: FAMILY MEDICINE CLINIC | Age: 69
End: 2024-04-26

## 2024-04-26 NOTE — TELEPHONE ENCOUNTER
Care Transitions Initial Follow Up Call    Outreach made within 2 business days of discharge: Yes    Patient: Rebekah Rodriguez Patient : 1955   MRN: [id number]  Reason for Admission: There are no discharge diagnoses documented for the most recent discharge.  Discharge Date: 24       Spoke with:     [facility] Interactive Patient Contact:  Was patient able to fill all prescriptions: Yes  Was patient instructed to bring all medications to the follow-up visit: Yes  Is patient taking all medications as directed in the discharge summary? Yes  Does patient understand their discharge instructions: Yes  Does patient have questions or concerns that need addressed prior to 7-14 day follow up office visit: no    Scheduled appointment with PCP within 7-14 days    Follow Up  Future Appointments   Date Time Provider Department Center   2024  1:40 PM Bobby Swan MD [facility]   2024  2:30 PM Lauro Gant, APRN - CNS [facility]   10/1/2024  2:40 PM Josemanuel Medrano APRN - CNS B[facility] ENDO [facility]   2024  2:40 PM Joya Garcia MD [facility]       Goran Squires RN

## 2024-04-28 ENCOUNTER — APPOINTMENT (OUTPATIENT)
Dept: GENERAL RADIOLOGY | Age: 69
End: 2024-04-28
Payer: MEDICARE

## 2024-04-28 ENCOUNTER — HOSPITAL ENCOUNTER (EMERGENCY)
Age: 69
Discharge: HOME | End: 2024-04-28
Attending: STUDENT IN AN ORGANIZED HEALTH CARE EDUCATION/TRAINING PROGRAM
Payer: MEDICARE

## 2024-04-28 VITALS
DIASTOLIC BLOOD PRESSURE: 109 MMHG | SYSTOLIC BLOOD PRESSURE: 153 MMHG | TEMPERATURE: 98.1 F | HEART RATE: 78 BPM | OXYGEN SATURATION: 95 % | RESPIRATION RATE: 16 BRPM

## 2024-04-28 DIAGNOSIS — M25.411 EFFUSION OF RIGHT SHOULDER JOINT: ICD-10-CM

## 2024-04-28 DIAGNOSIS — M25.511 ACUTE PAIN OF RIGHT SHOULDER: Primary | ICD-10-CM

## 2024-04-28 LAB
ALBUMIN SERPL-MCNC: 3.9 G/DL (ref 3.5–5.2)
ALP SERPL-CCNC: 123 U/L (ref 35–104)
ALT SERPL-CCNC: 32 U/L (ref 0–32)
ANION GAP SERPL CALCULATED.3IONS-SCNC: 11 MMOL/L (ref 7–16)
AST SERPL-CCNC: 22 U/L (ref 0–31)
BASOPHILS # BLD: 0.01 K/UL (ref 0–0.2)
BASOPHILS NFR BLD: 0 % (ref 0–2)
BILIRUB SERPL-MCNC: 0.3 MG/DL (ref 0–1.2)
BUN SERPL-MCNC: 27 MG/DL (ref 6–23)
CALCIUM SERPL-MCNC: 9 MG/DL (ref 8.6–10.2)
CHLORIDE SERPL-SCNC: 97 MMOL/L (ref 98–107)
CO2 SERPL-SCNC: 30 MMOL/L (ref 22–29)
CREAT SERPL-MCNC: 1.1 MG/DL (ref 0.5–1)
EOSINOPHIL # BLD: 0.04 K/UL (ref 0.05–0.5)
EOSINOPHILS RELATIVE PERCENT: 1 % (ref 0–6)
ERYTHROCYTE [DISTWIDTH] IN BLOOD BY AUTOMATED COUNT: 15.5 % (ref 11.5–15)
GFR SERPL CREATININE-BSD FRML MDRD: 58 ML/MIN/1.73M2
GLUCOSE SERPL-MCNC: 110 MG/DL (ref 74–99)
HCT VFR BLD AUTO: 39.6 % (ref 34–48)
HGB BLD-MCNC: 12.3 G/DL (ref 11.5–15.5)
IMM GRANULOCYTES # BLD AUTO: 0.06 K/UL (ref 0–0.58)
IMM GRANULOCYTES NFR BLD: 1 % (ref 0–5)
LYMPHOCYTES NFR BLD: 0.69 K/UL (ref 1.5–4)
LYMPHOCYTES RELATIVE PERCENT: 9 % (ref 20–42)
MAGNESIUM SERPL-MCNC: 2.2 MG/DL (ref 1.6–2.6)
MCH RBC QN AUTO: 25.8 PG (ref 26–35)
MCHC RBC AUTO-ENTMCNC: 31.1 G/DL (ref 32–34.5)
MCV RBC AUTO: 83.2 FL (ref 80–99.9)
MONOCYTES NFR BLD: 0.96 K/UL (ref 0.1–0.95)
MONOCYTES NFR BLD: 13 % (ref 2–12)
NEUTROPHILS NFR BLD: 76 % (ref 43–80)
NEUTS SEG NFR BLD: 5.69 K/UL (ref 1.8–7.3)
PLATELET # BLD AUTO: 249 K/UL (ref 130–450)
PMV BLD AUTO: 10.6 FL (ref 7–12)
POTASSIUM SERPL-SCNC: 3.2 MMOL/L (ref 3.5–5)
PROT SERPL-MCNC: 6.6 G/DL (ref 6.4–8.3)
RBC # BLD AUTO: 4.76 M/UL (ref 3.5–5.5)
SODIUM SERPL-SCNC: 138 MMOL/L (ref 132–146)
TROPONIN I SERPL HS-MCNC: 22 NG/L (ref 0–9)
TROPONIN I SERPL HS-MCNC: 23 NG/L (ref 0–9)
WBC OTHER # BLD: 7.5 K/UL (ref 4.5–11.5)

## 2024-04-28 PROCEDURE — 2700000000 HC OXYGEN THERAPY PER DAY

## 2024-04-28 PROCEDURE — 85025 COMPLETE CBC W/AUTO DIFF WBC: CPT

## 2024-04-28 PROCEDURE — 83735 ASSAY OF MAGNESIUM: CPT

## 2024-04-28 PROCEDURE — 99285 EMERGENCY DEPT VISIT HI MDM: CPT

## 2024-04-28 PROCEDURE — 84484 ASSAY OF TROPONIN QUANT: CPT

## 2024-04-28 PROCEDURE — 6370000000 HC RX 637 (ALT 250 FOR IP): Performed by: STUDENT IN AN ORGANIZED HEALTH CARE EDUCATION/TRAINING PROGRAM

## 2024-04-28 PROCEDURE — 96372 THER/PROPH/DIAG INJ SC/IM: CPT

## 2024-04-28 PROCEDURE — 71045 X-RAY EXAM CHEST 1 VIEW: CPT

## 2024-04-28 PROCEDURE — 73030 X-RAY EXAM OF SHOULDER: CPT

## 2024-04-28 PROCEDURE — 93005 ELECTROCARDIOGRAM TRACING: CPT | Performed by: STUDENT IN AN ORGANIZED HEALTH CARE EDUCATION/TRAINING PROGRAM

## 2024-04-28 PROCEDURE — 80053 COMPREHEN METABOLIC PANEL: CPT

## 2024-04-28 PROCEDURE — 6360000002 HC RX W HCPCS: Performed by: STUDENT IN AN ORGANIZED HEALTH CARE EDUCATION/TRAINING PROGRAM

## 2024-04-28 RX ORDER — FENTANYL CITRATE 50 UG/ML
50 INJECTION, SOLUTION INTRAMUSCULAR; INTRAVENOUS ONCE
Status: COMPLETED | OUTPATIENT
Start: 2024-04-28 | End: 2024-04-28

## 2024-04-28 RX ORDER — HYDROCODONE BITARTRATE AND ACETAMINOPHEN 5; 325 MG/1; MG/1
1 TABLET ORAL ONCE
Status: COMPLETED | OUTPATIENT
Start: 2024-04-28 | End: 2024-04-28

## 2024-04-28 RX ORDER — POTASSIUM CHLORIDE 20 MEQ/1
40 TABLET, EXTENDED RELEASE ORAL ONCE
Status: COMPLETED | OUTPATIENT
Start: 2024-04-28 | End: 2024-04-28

## 2024-04-28 RX ORDER — ORPHENADRINE CITRATE 30 MG/ML
60 INJECTION INTRAMUSCULAR; INTRAVENOUS ONCE
Status: DISCONTINUED | OUTPATIENT
Start: 2024-04-28 | End: 2024-04-28 | Stop reason: HOSPADM

## 2024-04-28 RX ORDER — LIDOCAINE 4 G/G
1 PATCH TOPICAL DAILY
Status: DISCONTINUED | OUTPATIENT
Start: 2024-04-28 | End: 2024-04-28 | Stop reason: HOSPADM

## 2024-04-28 RX ADMIN — HYDROCODONE BITARTRATE AND ACETAMINOPHEN 1 TABLET: 5; 325 TABLET ORAL at 14:16

## 2024-04-28 RX ADMIN — POTASSIUM CHLORIDE 40 MEQ: 1500 TABLET, EXTENDED RELEASE ORAL at 17:17

## 2024-04-28 RX ADMIN — FENTANYL CITRATE 50 MCG: 50 INJECTION INTRAMUSCULAR; INTRAVENOUS at 17:16

## 2024-04-28 ASSESSMENT — PAIN SCALES - GENERAL
PAINLEVEL_OUTOF10: 10
PAINLEVEL_OUTOF10: 9
PAINLEVEL_OUTOF10: 7

## 2024-04-28 ASSESSMENT — PAIN DESCRIPTION - LOCATION
LOCATION: ARM
LOCATION: ARM

## 2024-04-28 ASSESSMENT — PAIN DESCRIPTION - ORIENTATION
ORIENTATION: RIGHT
ORIENTATION: RIGHT

## 2024-04-28 ASSESSMENT — PAIN - FUNCTIONAL ASSESSMENT: PAIN_FUNCTIONAL_ASSESSMENT: 0-10

## 2024-04-28 NOTE — ED PROVIDER NOTES
Rebekah Rodriguez is a 68-year-old female present emergency department concern for right arm pain.  Patient states that she has a history of pain in that right arm.  Patient states she has a history of pain in her right knee and both shoulders.  Patient states symptoms have been present for almost 1 year.  Patient follows with Dr Lees for ortho.  Patient stated that she believes that she has slept on that arm last night and woke up with pain in the right shoulder.  Patient has increasing pain with movement of the right shoulder.  Patient denies fever, chills, nausea, vomiting patient is on a warmth to the joint.  Patient reported that she is on 6 L nasal cannula at her baseline currently she is not having any chest pain or shortness of breath    The history is provided by the patient and medical records.        Review of Systems   Constitutional:  Negative for chills, diaphoresis, fatigue and fever.   Eyes:  Negative for photophobia and visual disturbance.   Respiratory:  Negative for cough, chest tightness and shortness of breath.    Cardiovascular:  Negative for chest pain, palpitations and leg swelling.   Gastrointestinal:  Negative for abdominal distention, abdominal pain, diarrhea, nausea and vomiting.   Genitourinary:  Negative for dysuria.   Musculoskeletal:  Negative for neck pain and neck stiffness.   Skin:  Negative for pallor and rash.   Neurological:  Negative for headaches.   Psychiatric/Behavioral:  Negative for confusion.         Physical Exam  Vitals and nursing note reviewed.   Constitutional:       General: She is not in acute distress.     Appearance: She is ill-appearing.      Comments: Chronically ill in appearance   HENT:      Head: Normocephalic and atraumatic.   Eyes:      General: No scleral icterus.     Conjunctiva/sclera: Conjunctivae normal.      Pupils: Pupils are equal, round, and reactive to light.   Cardiovascular:      Rate and Rhythm: Normal rate and regular rhythm.

## 2024-04-28 NOTE — ED NOTES
PT ALERT AND ORIENTED TIMES 4. SKIN WARM AND DRY.RESPIRATIONS EVEN AND UNLABORED. DISCHARGE INSTRUCTIONS GIVEN AND PT VERBALIZED UNDERSTANDING OF THE INFORMATION OF ALL PAGES OF THE AFTER VISIT SUMMARY HAS BEEN REVIEWED WITH PATIENT AND FAMILY. PT GIVEN OPPORTUNITY TO ASK QUESTIONS REGARDING THERE INFORMATION. PT VERBALIZED UNDERSTANDING THEY SHOULD DISPOSE OF THE ARMBAND SAFELY AT HOME TO PROTECT THERE HEALTH INFORMATION. THE COMPLETE COPY WAS PROVIDED TO PATIENt

## 2024-04-28 NOTE — PROGRESS NOTES
04/28/24 1803   Oxygen Therapy/Pulse Ox   O2 Therapy Oxygen   $Oxygen $Daily Charge   O2 Device Nasal cannula   O2 Flow Rate (L/min) 6 L/min

## 2024-04-29 ENCOUNTER — TELEPHONE (OUTPATIENT)
Dept: ORTHOPEDIC SURGERY | Age: 69
End: 2024-04-29

## 2024-04-29 NOTE — TELEPHONE ENCOUNTER
Patient called office requesting appointment for ED follow up.    ED visit date: 04/28/2024    Injury: acute pain of right shoulder  XR Shoulder Right  FINDINGS:  04/09/2008 there appears to be a shoulder joint effusion.  Chronic erosive  changes are seen involving the glenoid and the proximal humeral head with  intra-articular ossified bodies.  The acromion and clavicle appear normal.  No acute consolidation or mass identified in the right upper lobe.     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     IMPRESSION:  1. Chronic erosive changes in significant volume loss involving the glenoid  and the proximal humeral head with intra-articular ossified bodies.  2. Shoulder joint effusion.  3. No acute fracture or dislocation.       Routed to providers for recommendations.    Future Appointments   Date Time Provider Department Center   5/1/2024  1:40 PM Bobby Swan MD [facility] [place] [facility]   5/20/2024  2:40 PM Lauro Gant APRN - CNS [facility]   10/1/2024  2:40 PM Josemanuel Medrano APRN - CNS [facility] ENDO [facility]   11/4/2024  2:40 PM Joya Garcia MD [facility]

## 2024-04-29 NOTE — TELEPHONE ENCOUNTER
Can you please review ER note and see where to place this patient   Neonatology Addendum 2022    Patient Name:DANIEL VICENTE   Account #:[id number]  MRN:[id number]  Gender:Male  YOB: 2021 7:31 PM    PHYSICAL EXAMINATION    Respiratory StatusRoom Air    Growth Parameter(s)Weight: 3.885 kg   Length: 54.6 cm   HC: 35.0 cm    General:Bed/Temperature Support (stable on radiant heat warmer); Respiratory   Support (room air);  Head:normocephalic; fontanelle soft; sutures (mobile, normal);  Ears:ears (normal);  Nose:nares (patent);  Throat:mouth (normal); oral cavity (normal); hard palate (Intact); soft palate   (Intact); tongue (normal);  Neck:general appearance (normal); range of motion (normal);  Respiratory:respiratory effort (20-40 breaths/min, normal); breath sounds   (bilateral, clear);  Cardiac:precordium (normal); rhythm (sinus rhythm); murmur (no); perfusion   (normal); pulses (normal);  Abdomen:abdomen (bowel sounds present, flat, nontender, organomegaly absent,   soft);  Genitourinary:genitalia (male, normal, term); testes (bilateral, descended);  Anus and Rectum:anus (patent);  Spine:spine appearance (normal);  Extremity:deformity (no); range of motion (normal);  Skin:skin appearance (term); congenital dermal melanocytosis (buttock);  Neuro:mental status (responsive); muscle tone (normal); Kirkman reflex (normal);   grasp reflex (normal); suck reflex (normal);    DIAGNOSES  1. Nutritional Support ()  Onset: 2022  Comments:  Feeding choice: breast/formula. Infant made NPO on admission secondary to   hypoglycemia.   Plans:  Begin Poly-Vi-sol with Iron when enteral feeds > 120 mg/kg/day    enteral feeds with advancement as tolerated   begin small feeds  follow electrolytes    2. Other  hypoglycemia (P70.4)  Onset: 2022  Comments:  Infant with hypoglycemia on well baby after receiving glucose gel x3 and formula   supplementation. Glucose 88 after D10W bolus.   Plans:  Continue IV fluids     D10 minibolus (2 ml/kg) as needed  follow serial AC  glucose levels on enteral feeds     3. Diaper dermatitis (L22)  Onset: 2022  Comments:  At risk due to gestational age.  Plans:   continue zinc oxide PRN     4. Encounter for examination of ears and hearing without abnormal findings   (Z01.10)  Onset: 2022  Comments:  [place] hearing screening indicated. Passed ABR .    5. Encounter for screening for other metabolic disorders - [place] Metabolic   Screening (Z13.228)  Onset: 2022  Comments:  [place] metabolic screening indicated, sent  at .  Plans:   follow  screen     6.  jaundice, unspecified (P59.9)  Onset: 2022  Comments:   screening indicated.  Infant's Blood Type: O   Infant's Rh: POS Bilirubin 7.3 at 24 hours and 7.4 at 35 hours, well below   phototherapy threshold.  Plans:   follow clinically     7. Encounter for immunization (Z23)  Onset: 2022  Comments:  Recommended immunizations prior to discharge as indicated. Received hepatitis B   vaccination on .  Plans:   complete immunizations on schedule     8. Restlessness and agitation (R45.1)  Onset: 2022  Comments:  Analgesia indicated for painful procedures as needed.  Plans:   24% Sucrose Solution orally PRN painful procedures per protocol     9. Single liveborn infant, delivered vaginally (Z38.00)  Onset: 2022  Comments:  Per the American Academy of Pediatrics, prophylaxis against gonococcal   ophthalmia neonatorum and prophylaxis to prevent Vitamin K-dependent hemorrhagic   disease of the  are recommended at birth. Received both after delivery.    10. Encounter for screening for cardiovascular disorders (Z13.6)  Onset: 2022 Resolved: 2022  Comments:  Screening for congenital heart disease by pulse oximetry indicated per American   Academy of Pediatric guidelines. Passed pulse ox study .    11. [place] (suspected to be) affected by maternal infectious and parasitic   diseases - infants < 28 days of  age (P00.2)  Onset: 2022  Comments:  Infant with hypoglycemia. Screening CBC reassuring.   Plans:   follow blood culture     12. Other specified disturbances of temperature regulation of  (P81.8)  Onset: 2022  Comments:  Admitted to radiant heat warmer.   Plans:  follow temperature on a radiant heat warmer, move to crib when stable     13. Post-term  (P08.21)  Onset: 2022    CARE PLAN  1. Parental Interaction  Onset: 2022  Comments  Mother and grandmother updated at the bedside. Father updated per phone.   Restarting small feedings and will wean IVFs with AC glucose checks as   tolerated.   Plans   continue family updates     Preparer:COLBY: CATIE Cheung, APRN 2022 11:33 AM      Attending: COLBY: Luisa Lundy MD 2022 4:40 PM

## 2024-04-29 NOTE — TELEPHONE ENCOUNTER
Spoke to the patients  and scheduled appointment. Gave the  directions and address for appointment.    Future Appointments   Date Time Provider Department Center   5/1/2024  1:40 PM Bobby Swan MD [facility] [place] [facility]   5/15/2024 10:30 AM Temo Lees MD [facility]   5/20/2024  2:40 PM Lauro Gant APRN - CNS [facility]   10/1/2024  2:40 PM Josemanuel Medrano APRN - CNS B[facility] ENDO [facility]   11/4/2024  2:40 PM Joya Garcia MD [facility]

## 2024-04-30 LAB
EKG ATRIAL RATE: 258 BPM
EKG Q-T INTERVAL: 338 MS
EKG QRS DURATION: 84 MS
EKG QTC CALCULATION (BAZETT): 420 MS
EKG R AXIS: 103 DEGREES
EKG T AXIS: 165 DEGREES
EKG VENTRICULAR RATE: 93 BPM

## 2024-04-30 PROCEDURE — 93010 ELECTROCARDIOGRAM REPORT: CPT | Performed by: INTERNAL MEDICINE

## 2024-05-09 DIAGNOSIS — E55.9 VITAMIN D DEFICIENCY: ICD-10-CM

## 2024-05-09 RX ORDER — CYCLOBENZAPRINE HCL 10 MG
10 TABLET ORAL 3 TIMES DAILY PRN
Qty: 30 TABLET | Refills: 0 | Status: ON HOLD | OUTPATIENT
Start: 2024-05-09 | End: 2024-05-19

## 2024-05-09 RX ORDER — MELATONIN
1000 DAILY
Qty: 90 TABLET | Refills: 1 | Status: ON HOLD | OUTPATIENT
Start: 2024-05-09

## 2024-05-09 NOTE — TELEPHONE ENCOUNTER
Last Appointment:  3/21/2024  Future Appointments   Date Time Provider Department Center   5/15/2024 10:30 AM Temo Lees MD [facility]   5/20/2024  2:40 PM Lauro Gant APRN - CNS [facility]   10/1/2024  2:40 PM Josemanuel Medrano APRN - CNS B[facility] ENDO [facility]   11/4/2024  2:40 PM Joya Garcia MD [facility]

## 2024-05-10 ENCOUNTER — APPOINTMENT (OUTPATIENT)
Dept: GENERAL RADIOLOGY | Age: 69
DRG: 208 | End: 2024-05-10
Payer: MEDICARE

## 2024-05-10 ENCOUNTER — APPOINTMENT (OUTPATIENT)
Dept: ULTRASOUND IMAGING | Age: 69
DRG: 208 | End: 2024-05-10
Payer: MEDICARE

## 2024-05-10 ENCOUNTER — HOSPITAL ENCOUNTER (INPATIENT)
Age: 69
LOS: 23 days | DRG: 208 | End: 2024-06-03
Attending: EMERGENCY MEDICINE | Admitting: FAMILY MEDICINE
Payer: MEDICARE

## 2024-05-10 DIAGNOSIS — I27.20 PULMONARY HYPERTENSION (HCC): ICD-10-CM

## 2024-05-10 DIAGNOSIS — R06.02 SHORTNESS OF BREATH: ICD-10-CM

## 2024-05-10 DIAGNOSIS — R52 PAIN: Primary | ICD-10-CM

## 2024-05-10 DIAGNOSIS — I50.9 ACUTE CONGESTIVE HEART FAILURE, UNSPECIFIED HEART FAILURE TYPE (HCC): ICD-10-CM

## 2024-05-10 DIAGNOSIS — J81.0 ACUTE PULMONARY EDEMA (HCC): Primary | ICD-10-CM

## 2024-05-10 DIAGNOSIS — I48.19 PERSISTENT ATRIAL FIBRILLATION (HCC): ICD-10-CM

## 2024-05-10 DIAGNOSIS — J96.21 ACUTE ON CHRONIC RESPIRATORY FAILURE WITH HYPOXIA (HCC): ICD-10-CM

## 2024-05-10 LAB
ALBUMIN SERPL-MCNC: 3.4 G/DL (ref 3.5–5.2)
ALP SERPL-CCNC: 178 U/L (ref 35–104)
ALT SERPL-CCNC: 39 U/L (ref 0–32)
ANION GAP SERPL CALCULATED.3IONS-SCNC: 11 MMOL/L (ref 7–16)
AST SERPL-CCNC: 45 U/L (ref 0–31)
B.E.: 3.6 MMOL/L (ref -3–3)
BASOPHILS # BLD: 0.06 K/UL (ref 0–0.2)
BASOPHILS NFR BLD: 1 % (ref 0–2)
BILIRUB SERPL-MCNC: 0.5 MG/DL (ref 0–1.2)
BLASTS ABSOLUTE COUNT: 0.13 K/UL
BLASTS: 2 %
BNP SERPL-MCNC: 6073 PG/ML (ref 0–125)
BUN SERPL-MCNC: 25 MG/DL (ref 6–23)
CALCIUM SERPL-MCNC: 9 MG/DL (ref 8.6–10.2)
CHLORIDE SERPL-SCNC: 105 MMOL/L (ref 98–107)
CK SERPL-CCNC: 141 U/L (ref 20–180)
CO2 SERPL-SCNC: 27 MMOL/L (ref 22–29)
COHB: 0.1 % (ref 0–1.5)
CREAT SERPL-MCNC: 1 MG/DL (ref 0.5–1)
CRITICAL: ABNORMAL
DATE ANALYZED: ABNORMAL
DATE OF COLLECTION: ABNORMAL
EKG ATRIAL RATE: 119 BPM
EKG Q-T INTERVAL: 390 MS
EKG QRS DURATION: 80 MS
EKG QTC CALCULATION (BAZETT): 429 MS
EKG R AXIS: 128 DEGREES
EKG T AXIS: 58 DEGREES
EKG VENTRICULAR RATE: 73 BPM
EOSINOPHIL # BLD: 0.25 K/UL (ref 0.05–0.5)
EOSINOPHILS RELATIVE PERCENT: 4 % (ref 0–6)
ERYTHROCYTE [DISTWIDTH] IN BLOOD BY AUTOMATED COUNT: 16.2 % (ref 11.5–15)
GFR, ESTIMATED: 66 ML/MIN/1.73M2
GGT SERPL-CCNC: 175 U/L (ref 6–42)
GLUCOSE SERPL-MCNC: 104 MG/DL (ref 74–99)
HCO3: 28 MMOL/L (ref 22–26)
HCT VFR BLD AUTO: 30.6 % (ref 34–48)
HGB BLD-MCNC: 9.5 G/DL (ref 11.5–15.5)
HHB: 0.3 % (ref 0–5)
LAB: ABNORMAL
LYMPHOCYTES NFR BLD: 0.38 K/UL (ref 1.5–4)
LYMPHOCYTES RELATIVE PERCENT: 6 % (ref 20–42)
Lab: 1329
MCH RBC QN AUTO: 26.3 PG (ref 26–35)
MCHC RBC AUTO-ENTMCNC: 31 G/DL (ref 32–34.5)
MCV RBC AUTO: 84.8 FL (ref 80–99.9)
METHB: 0.2 % (ref 0–1.5)
MODE: ABNORMAL
MONOCYTES NFR BLD: 0.5 K/UL (ref 0.1–0.95)
MONOCYTES NFR BLD: 8 % (ref 2–12)
NEUTROPHILS NFR BLD: 79 % (ref 43–80)
NEUTS SEG NFR BLD: 4.98 K/UL (ref 1.8–7.3)
O2 SATURATION: 99.7 % (ref 92–98.5)
O2HB: 99.4 % (ref 94–97)
OPERATOR ID: 5323
PATIENT TEMP: 37 C
PCO2: 41.5 MMHG (ref 35–45)
PH BLOOD GAS: 7.45 (ref 7.35–7.45)
PLATELET, FLUORESCENCE: 125 K/UL (ref 130–450)
PMV BLD AUTO: 10.7 FL (ref 7–12)
PO2: 289.4 MMHG (ref 75–100)
POTASSIUM SERPL-SCNC: 3.8 MMOL/L (ref 3.5–5)
PROCALCITONIN SERPL-MCNC: 0.14 NG/ML (ref 0–0.08)
PROT SERPL-MCNC: 6.4 G/DL (ref 6.4–8.3)
RBC # BLD AUTO: 3.61 M/UL (ref 3.5–5.5)
RBC # BLD: ABNORMAL 10*6/UL
SODIUM SERPL-SCNC: 143 MMOL/L (ref 132–146)
SOURCE, BLOOD GAS: ABNORMAL
T4 FREE SERPL-MCNC: 1.3 NG/DL (ref 0.9–1.7)
THB: 10.6 G/DL (ref 11.5–16.5)
TIME ANALYZED: 1337
TROPONIN I SERPL HS-MCNC: 15 NG/L (ref 0–9)
TROPONIN I SERPL HS-MCNC: 17 NG/L (ref 0–9)
TSH SERPL DL<=0.05 MIU/L-ACNC: 0.62 UIU/ML (ref 0.27–4.2)
WBC # BLD: ABNORMAL 10*3/UL
WBC OTHER # BLD: 6.3 K/UL (ref 4.5–11.5)

## 2024-05-10 PROCEDURE — 87449 NOS EACH ORGANISM AG IA: CPT

## 2024-05-10 PROCEDURE — 82550 ASSAY OF CK (CPK): CPT

## 2024-05-10 PROCEDURE — 81001 URINALYSIS AUTO W/SCOPE: CPT

## 2024-05-10 PROCEDURE — 6360000002 HC RX W HCPCS

## 2024-05-10 PROCEDURE — 76999 ECHO EXAMINATION PROCEDURE: CPT

## 2024-05-10 PROCEDURE — 93005 ELECTROCARDIOGRAM TRACING: CPT | Performed by: EMERGENCY MEDICINE

## 2024-05-10 PROCEDURE — 82805 BLOOD GASES W/O2 SATURATION: CPT

## 2024-05-10 PROCEDURE — 6370000000 HC RX 637 (ALT 250 FOR IP)

## 2024-05-10 PROCEDURE — 93971 EXTREMITY STUDY: CPT

## 2024-05-10 PROCEDURE — G0378 HOSPITAL OBSERVATION PER HR: HCPCS

## 2024-05-10 PROCEDURE — 87086 URINE CULTURE/COLONY COUNT: CPT

## 2024-05-10 PROCEDURE — 5A09557 ASSISTANCE WITH RESPIRATORY VENTILATION, GREATER THAN 96 CONSECUTIVE HOURS, CONTINUOUS POSITIVE AIRWAY PRESSURE: ICD-10-PCS | Performed by: FAMILY MEDICINE

## 2024-05-10 PROCEDURE — 87899 AGENT NOS ASSAY W/OPTIC: CPT

## 2024-05-10 PROCEDURE — 6370000000 HC RX 637 (ALT 250 FOR IP): Performed by: EMERGENCY MEDICINE

## 2024-05-10 PROCEDURE — 96374 THER/PROPH/DIAG INJ IV PUSH: CPT

## 2024-05-10 PROCEDURE — 94664 DEMO&/EVAL PT USE INHALER: CPT

## 2024-05-10 PROCEDURE — 83880 ASSAY OF NATRIURETIC PEPTIDE: CPT

## 2024-05-10 PROCEDURE — 84439 ASSAY OF FREE THYROXINE: CPT

## 2024-05-10 PROCEDURE — 85025 COMPLETE CBC W/AUTO DIFF WBC: CPT

## 2024-05-10 PROCEDURE — 6360000002 HC RX W HCPCS: Performed by: EMERGENCY MEDICINE

## 2024-05-10 PROCEDURE — 82977 ASSAY OF GGT: CPT

## 2024-05-10 PROCEDURE — 84443 ASSAY THYROID STIM HORMONE: CPT

## 2024-05-10 PROCEDURE — 84145 PROCALCITONIN (PCT): CPT

## 2024-05-10 PROCEDURE — 84484 ASSAY OF TROPONIN QUANT: CPT

## 2024-05-10 PROCEDURE — 99285 EMERGENCY DEPT VISIT HI MDM: CPT

## 2024-05-10 PROCEDURE — 87081 CULTURE SCREEN ONLY: CPT

## 2024-05-10 PROCEDURE — 80053 COMPREHEN METABOLIC PANEL: CPT

## 2024-05-10 PROCEDURE — 71045 X-RAY EXAM CHEST 1 VIEW: CPT

## 2024-05-10 PROCEDURE — 0202U NFCT DS 22 TRGT SARS-COV-2: CPT

## 2024-05-10 PROCEDURE — 93010 ELECTROCARDIOGRAM REPORT: CPT | Performed by: INTERNAL MEDICINE

## 2024-05-10 PROCEDURE — 84080 ASSAY ALKALINE PHOSPHATASES: CPT

## 2024-05-10 PROCEDURE — 84075 ASSAY ALKALINE PHOSPHATASE: CPT

## 2024-05-10 PROCEDURE — 94640 AIRWAY INHALATION TREATMENT: CPT

## 2024-05-10 RX ORDER — ACETAMINOPHEN 325 MG/1
650 TABLET ORAL EVERY 6 HOURS PRN
Status: DISCONTINUED | OUTPATIENT
Start: 2024-05-10 | End: 2024-05-13

## 2024-05-10 RX ORDER — FERROUS SULFATE 325(65) MG
325 TABLET ORAL 2 TIMES DAILY
Status: DISCONTINUED | OUTPATIENT
Start: 2024-05-10 | End: 2024-06-03 | Stop reason: HOSPADM

## 2024-05-10 RX ORDER — INSULIN LISPRO 100 [IU]/ML
0-4 INJECTION, SOLUTION INTRAVENOUS; SUBCUTANEOUS
Status: DISCONTINUED | OUTPATIENT
Start: 2024-05-11 | End: 2024-06-03 | Stop reason: HOSPADM

## 2024-05-10 RX ORDER — LEVETIRACETAM 500 MG/1
500 TABLET ORAL 2 TIMES DAILY
Status: DISCONTINUED | OUTPATIENT
Start: 2024-05-10 | End: 2024-06-02

## 2024-05-10 RX ORDER — HYDROCORTISONE 5 MG/1
10 TABLET ORAL EVERY MORNING
Status: DISCONTINUED | OUTPATIENT
Start: 2024-05-11 | End: 2024-06-03 | Stop reason: HOSPADM

## 2024-05-10 RX ORDER — OMEGA-3-ACID ETHYL ESTERS 1 G/1
2 CAPSULE, LIQUID FILLED ORAL 2 TIMES DAILY
Status: DISCONTINUED | OUTPATIENT
Start: 2024-05-10 | End: 2024-06-03 | Stop reason: HOSPADM

## 2024-05-10 RX ORDER — IPRATROPIUM BROMIDE AND ALBUTEROL SULFATE 2.5; .5 MG/3ML; MG/3ML
1 SOLUTION RESPIRATORY (INHALATION)
Status: DISCONTINUED | OUTPATIENT
Start: 2024-05-10 | End: 2024-05-11

## 2024-05-10 RX ORDER — HYDROCORTISONE 5 MG/1
5 TABLET ORAL NIGHTLY
Status: DISCONTINUED | OUTPATIENT
Start: 2024-05-10 | End: 2024-06-03 | Stop reason: HOSPADM

## 2024-05-10 RX ORDER — MULTIVITAMIN WITH IRON
1 TABLET ORAL DAILY
Status: DISCONTINUED | OUTPATIENT
Start: 2024-05-10 | End: 2024-06-03 | Stop reason: HOSPADM

## 2024-05-10 RX ORDER — BUDESONIDE 0.5 MG/2ML
0.5 INHALANT ORAL
Status: DISCONTINUED | OUTPATIENT
Start: 2024-05-10 | End: 2024-06-03 | Stop reason: HOSPADM

## 2024-05-10 RX ORDER — ONDANSETRON 4 MG/1
4 TABLET, ORALLY DISINTEGRATING ORAL EVERY 8 HOURS PRN
Status: DISCONTINUED | OUTPATIENT
Start: 2024-05-10 | End: 2024-05-30 | Stop reason: SDUPTHER

## 2024-05-10 RX ORDER — SODIUM CHLORIDE 9 MG/ML
INJECTION, SOLUTION INTRAVENOUS PRN
Status: DISCONTINUED | OUTPATIENT
Start: 2024-05-10 | End: 2024-05-30 | Stop reason: SDUPTHER

## 2024-05-10 RX ORDER — ONDANSETRON 2 MG/ML
4 INJECTION INTRAMUSCULAR; INTRAVENOUS EVERY 6 HOURS PRN
Status: DISCONTINUED | OUTPATIENT
Start: 2024-05-10 | End: 2024-05-30 | Stop reason: SDUPTHER

## 2024-05-10 RX ORDER — VITAMIN B COMPLEX
1000 TABLET ORAL DAILY
Status: DISCONTINUED | OUTPATIENT
Start: 2024-05-10 | End: 2024-06-03 | Stop reason: HOSPADM

## 2024-05-10 RX ORDER — POLYETHYLENE GLYCOL 3350 17 G/17G
17 POWDER, FOR SOLUTION ORAL DAILY PRN
Status: DISCONTINUED | OUTPATIENT
Start: 2024-05-10 | End: 2024-05-30 | Stop reason: SDUPTHER

## 2024-05-10 RX ORDER — DULOXETIN HYDROCHLORIDE 30 MG/1
60 CAPSULE, DELAYED RELEASE ORAL DAILY
Status: DISCONTINUED | OUTPATIENT
Start: 2024-05-10 | End: 2024-05-19

## 2024-05-10 RX ORDER — ARFORMOTEROL TARTRATE 15 UG/2ML
15 SOLUTION RESPIRATORY (INHALATION)
Status: DISCONTINUED | OUTPATIENT
Start: 2024-05-10 | End: 2024-06-03 | Stop reason: HOSPADM

## 2024-05-10 RX ORDER — SODIUM CHLORIDE 0.9 % (FLUSH) 0.9 %
5-40 SYRINGE (ML) INJECTION PRN
Status: DISCONTINUED | OUTPATIENT
Start: 2024-05-10 | End: 2024-06-03 | Stop reason: HOSPADM

## 2024-05-10 RX ORDER — INSULIN LISPRO 100 [IU]/ML
0-4 INJECTION, SOLUTION INTRAVENOUS; SUBCUTANEOUS NIGHTLY
Status: DISCONTINUED | OUTPATIENT
Start: 2024-05-10 | End: 2024-06-03 | Stop reason: HOSPADM

## 2024-05-10 RX ORDER — LEVOTHYROXINE SODIUM 0.03 MG/1
50 TABLET ORAL DAILY
Status: DISCONTINUED | OUTPATIENT
Start: 2024-05-10 | End: 2024-06-03 | Stop reason: HOSPADM

## 2024-05-10 RX ORDER — METOPROLOL SUCCINATE 50 MG/1
50 TABLET, EXTENDED RELEASE ORAL 2 TIMES DAILY
Status: DISCONTINUED | OUTPATIENT
Start: 2024-05-10 | End: 2024-06-03 | Stop reason: HOSPADM

## 2024-05-10 RX ORDER — DESMOPRESSIN ACETATE 0.1 MG/1
200 TABLET ORAL 2 TIMES DAILY
Status: DISCONTINUED | OUTPATIENT
Start: 2024-05-10 | End: 2024-06-03 | Stop reason: HOSPADM

## 2024-05-10 RX ORDER — AMLODIPINE BESYLATE 5 MG/1
5 TABLET ORAL DAILY
Status: DISCONTINUED | OUTPATIENT
Start: 2024-05-10 | End: 2024-06-03 | Stop reason: HOSPADM

## 2024-05-10 RX ORDER — SILDENAFIL CITRATE 20 MG/1
10 TABLET ORAL 3 TIMES DAILY
Status: DISCONTINUED | OUTPATIENT
Start: 2024-05-10 | End: 2024-06-03 | Stop reason: HOSPADM

## 2024-05-10 RX ORDER — CYCLOBENZAPRINE HCL 10 MG
10 TABLET ORAL 3 TIMES DAILY PRN
Status: DISCONTINUED | OUTPATIENT
Start: 2024-05-10 | End: 2024-06-03 | Stop reason: HOSPADM

## 2024-05-10 RX ORDER — GUAIFENESIN 400 MG/1
400 TABLET ORAL 4 TIMES DAILY PRN
Status: DISCONTINUED | OUTPATIENT
Start: 2024-05-10 | End: 2024-06-03 | Stop reason: HOSPADM

## 2024-05-10 RX ORDER — ACETAMINOPHEN 650 MG/1
650 SUPPOSITORY RECTAL EVERY 6 HOURS PRN
Status: DISCONTINUED | OUTPATIENT
Start: 2024-05-10 | End: 2024-05-13

## 2024-05-10 RX ORDER — DIGOXIN 125 MCG
62.5 TABLET ORAL DAILY
Status: DISCONTINUED | OUTPATIENT
Start: 2024-05-10 | End: 2024-06-03 | Stop reason: HOSPADM

## 2024-05-10 RX ORDER — HYDROCODONE BITARTRATE AND ACETAMINOPHEN 5; 325 MG/1; MG/1
2 TABLET ORAL ONCE
Status: COMPLETED | OUTPATIENT
Start: 2024-05-10 | End: 2024-05-10

## 2024-05-10 RX ORDER — FUROSEMIDE 10 MG/ML
40 INJECTION INTRAMUSCULAR; INTRAVENOUS ONCE
Status: COMPLETED | OUTPATIENT
Start: 2024-05-10 | End: 2024-05-10

## 2024-05-10 RX ORDER — SODIUM CHLORIDE 0.9 % (FLUSH) 0.9 %
5-40 SYRINGE (ML) INJECTION EVERY 12 HOURS SCHEDULED
Status: DISCONTINUED | OUTPATIENT
Start: 2024-05-10 | End: 2024-06-03 | Stop reason: HOSPADM

## 2024-05-10 RX ADMIN — DIGOXIN 62.5 MCG: 0.12 TABLET ORAL at 20:29

## 2024-05-10 RX ADMIN — IPRATROPIUM BROMIDE AND ALBUTEROL SULFATE 1 DOSE: 2.5; .5 SOLUTION RESPIRATORY (INHALATION) at 19:49

## 2024-05-10 RX ADMIN — CYCLOBENZAPRINE 10 MG: 10 TABLET, FILM COATED ORAL at 20:28

## 2024-05-10 RX ADMIN — FUROSEMIDE 40 MG: 10 INJECTION, SOLUTION INTRAMUSCULAR; INTRAVENOUS at 14:59

## 2024-05-10 RX ADMIN — DULOXETINE HYDROCHLORIDE 60 MG: 60 CAPSULE, DELAYED RELEASE ORAL at 20:30

## 2024-05-10 RX ADMIN — AMLODIPINE BESYLATE 5 MG: 5 TABLET ORAL at 20:30

## 2024-05-10 RX ADMIN — HYDROCODONE BITARTRATE AND ACETAMINOPHEN 2 TABLET: 5; 325 TABLET ORAL at 15:49

## 2024-05-10 RX ADMIN — Medication 1000 UNITS: at 20:30

## 2024-05-10 RX ADMIN — MULTIVITAMIN TABLET 1 TABLET: TABLET at 20:30

## 2024-05-10 RX ADMIN — ACETAMINOPHEN 650 MG: 325 TABLET ORAL at 20:30

## 2024-05-10 RX ADMIN — BUDESONIDE INHALATION 500 MCG: 0.5 SUSPENSION RESPIRATORY (INHALATION) at 19:49

## 2024-05-10 RX ADMIN — ARFORMOTEROL TARTRATE 15 MCG: 15 SOLUTION RESPIRATORY (INHALATION) at 19:49

## 2024-05-10 ASSESSMENT — PAIN DESCRIPTION - LOCATION: LOCATION: SHOULDER

## 2024-05-10 ASSESSMENT — PAIN DESCRIPTION - ORIENTATION: ORIENTATION: RIGHT;LEFT

## 2024-05-10 ASSESSMENT — PAIN DESCRIPTION - DESCRIPTORS: DESCRIPTORS: ACHING

## 2024-05-10 ASSESSMENT — PAIN SCALES - GENERAL: PAINLEVEL_OUTOF10: 8

## 2024-05-10 NOTE — ED PROVIDER NOTES
[facility] EMERGENCY DEPARTMENT  EMERGENCY DEPARTMENT ENCOUNTER        Pt Name: Rebekah Rodriguez  MRN: [id number]  Birthdate 1955  Date of evaluation: 5/10/2024  Provider: Chris Metz MD  PCP: Lauro Ellington MD  Note Started: 1:19 PM EDT 5/10/24    CHIEF COMPLAINT       Chief Complaint   Patient presents with    Arm Pain    Generalized Body Aches     Patient c/o generalized body aches and bilateral arm pain . Patient arrived from home on CPAP       HISTORY OF PRESENT ILLNESS: 1 or more Elements        Limitations to history : None    Rebekah Rodriguez is a 68 y.o. female who presents for bilateral arm pain, left arm pain from recent IV site as well as generalized body aches. Reports she has pain in both arms/shoulders. Aching, ongoing pain for weeks. Reports she has pain is aching, non exertional and along both arms.  The only localized spot of her pain is her left antecubital fossa where there is a slightly swollen area that she says is from her recent IV site placement.  She denies any crepitus or warmth erythema.  She denies any drainage.  She denies any swelling of the extremities.  She is on blood thinners.  She has chronic respiratory failure and uses BiPAP intermittently at home and mainly at night but reports that she has had to be on her BiPAP all day today..  She is on her home BiPAP today.  She has sarcoidosis.  She denies any numbness or tingling or paralysis or weakness.  She denies chest pain.  She denies abdominal pain.  She denies orthopnea.  She denies any unilateral extremity swelling.  She denies any falls or trauma.  EMS reports they tried to take her off BiPAP and transported her here and her oxygen was in the 70 percentile.  She denies any other complaints.  She is on Eliquis.  She has a history of heart failure.      Nursing Notes were all reviewed and agreed with or any disagreements were addressed in the HPI.      REVIEW OF EXTERNAL NOTE :

## 2024-05-10 NOTE — ED NOTES
Patient has been noted out of bed with her bipap off twice now, order for a sitter placed at this time.

## 2024-05-10 NOTE — H&P
[facility] - Family Medicine Resident Inpatient  History and Physical      CC: Arm Pain and Generalized Body Aches (Patient c/o generalized body aches and bilateral arm pain . Patient arrived from home on CPAP)  .    HPI: History obtained from patient. Patient is oriented to time, place, person.  Rebekah Rodriguez is a 68 y.o. female with a past medical history of pulmonary hypertension, rectal prolapse, sarcoidosis, permanent A-fib on apixaban, Chronic HFpEF (EF 60%), COPD with chronic hypoxia requiring 6 L of oxygen at home during the night on BiPAP at night, chronic kidney disease, pulmonary embolism, adrenal insufficiency on hydrocortisone and central diabetes insipidus who presented to the ED for generalized body aches and bilateral arm pain. Of note, patient presented to [facility] on 4/28 for acute right shoulder pain with no findings concerning for a septic joint. Xray at the time was remarkable for shoulder joint effusion and findings consistent with chronic erosive changes of the right shoulder. Her pain was managed with medication and she was stable to discharge home. She returned to the ED this morning for persistent arm pain after running out of her pain medications. She also endorsed to wearing her Bipap only a few hours during the night and a few hours during the day. This morning, she was feeling SOB, prompting her to wear his Bipap all day long. Patient was going to be discharged home after shoulder workup was conducted, but she desaturated to the 70's after attempting to take her off the Bipap.     ED Course:   The patient was hypertensive, but otherwise she was afebrile and vitally stable   Labs: mild transaminates and elevated Alk phos, elevated pro-BNP (6,073), elevated troponin 17, low Hgb. ABG with primary metabolic alkalosis with appropriate respiratory compensation   Imaging:   LUE Soft tissue US: 1.6 cm x 1.4 cm x 1.2 cm nonvascular complex structure adjacent to the left AC joint with recommended MRI

## 2024-05-11 ENCOUNTER — APPOINTMENT (OUTPATIENT)
Dept: CT IMAGING | Age: 69
DRG: 208 | End: 2024-05-11
Payer: MEDICARE

## 2024-05-11 PROBLEM — J96.91 HYPOXIC RESPIRATORY FAILURE (HCC): Status: ACTIVE | Noted: 2024-05-11

## 2024-05-11 LAB
ALBUMIN SERPL-MCNC: 3.8 G/DL (ref 3.5–5.2)
ALP SERPL-CCNC: 174 U/L (ref 35–104)
ALT SERPL-CCNC: 35 U/L (ref 0–32)
ANION GAP SERPL CALCULATED.3IONS-SCNC: 17 MMOL/L (ref 7–16)
AST SERPL-CCNC: 33 U/L (ref 0–31)
B PARAP IS1001 DNA NPH QL NAA+NON-PROBE: NOT DETECTED
B PERT DNA SPEC QL NAA+PROBE: NOT DETECTED
BACTERIA URNS QL MICRO: ABNORMAL
BASOPHILS # BLD: 0.02 K/UL (ref 0–0.2)
BASOPHILS NFR BLD: 0 % (ref 0–2)
BILIRUB SERPL-MCNC: 0.7 MG/DL (ref 0–1.2)
BILIRUB UR QL STRIP: NEGATIVE
BNP SERPL-MCNC: 5271 PG/ML (ref 0–125)
BUN SERPL-MCNC: 21 MG/DL (ref 6–23)
C PNEUM DNA NPH QL NAA+NON-PROBE: NOT DETECTED
CALCIUM SERPL-MCNC: 9.2 MG/DL (ref 8.6–10.2)
CHLORIDE SERPL-SCNC: 101 MMOL/L (ref 98–107)
CLARITY UR: CLEAR
CO2 SERPL-SCNC: 27 MMOL/L (ref 22–29)
COLOR UR: YELLOW
CREAT SERPL-MCNC: 0.8 MG/DL (ref 0.5–1)
EOSINOPHIL # BLD: 0.18 K/UL (ref 0.05–0.5)
EOSINOPHILS RELATIVE PERCENT: 3 % (ref 0–6)
ERYTHROCYTE [DISTWIDTH] IN BLOOD BY AUTOMATED COUNT: 16.2 % (ref 11.5–15)
FLUAV RNA NPH QL NAA+NON-PROBE: NOT DETECTED
FLUBV RNA NPH QL NAA+NON-PROBE: NOT DETECTED
GFR, ESTIMATED: 76 ML/MIN/1.73M2
GLUCOSE BLD-MCNC: 77 MG/DL (ref 74–99)
GLUCOSE BLD-MCNC: 89 MG/DL (ref 74–99)
GLUCOSE BLD-MCNC: 96 MG/DL (ref 74–99)
GLUCOSE SERPL-MCNC: 80 MG/DL (ref 74–99)
GLUCOSE UR STRIP-MCNC: NEGATIVE MG/DL
HADV DNA NPH QL NAA+NON-PROBE: NOT DETECTED
HCOV 229E RNA NPH QL NAA+NON-PROBE: NOT DETECTED
HCOV HKU1 RNA NPH QL NAA+NON-PROBE: NOT DETECTED
HCOV NL63 RNA NPH QL NAA+NON-PROBE: NOT DETECTED
HCOV OC43 RNA NPH QL NAA+NON-PROBE: NOT DETECTED
HCT VFR BLD AUTO: 33.8 % (ref 34–48)
HGB BLD-MCNC: 10.1 G/DL (ref 11.5–15.5)
HGB UR QL STRIP.AUTO: ABNORMAL
HMPV RNA NPH QL NAA+NON-PROBE: NOT DETECTED
HPIV1 RNA NPH QL NAA+NON-PROBE: NOT DETECTED
HPIV2 RNA NPH QL NAA+NON-PROBE: NOT DETECTED
HPIV3 RNA NPH QL NAA+NON-PROBE: NOT DETECTED
HPIV4 RNA NPH QL NAA+NON-PROBE: NOT DETECTED
IMM GRANULOCYTES # BLD AUTO: 0.03 K/UL (ref 0–0.58)
IMM GRANULOCYTES NFR BLD: 1 % (ref 0–5)
INR PPP: 1.7
KETONES UR STRIP-MCNC: NEGATIVE MG/DL
L PNEUMO1 AG UR QL IA.RAPID: NEGATIVE
LACTATE BLDV-SCNC: 1.4 MMOL/L (ref 0.5–2.2)
LEUKOCYTE ESTERASE UR QL STRIP: ABNORMAL
LYMPHOCYTES NFR BLD: 0.49 K/UL (ref 1.5–4)
LYMPHOCYTES RELATIVE PERCENT: 8 % (ref 20–42)
M PNEUMO DNA NPH QL NAA+NON-PROBE: NOT DETECTED
MAGNESIUM SERPL-MCNC: 1.8 MG/DL (ref 1.6–2.6)
MCH RBC QN AUTO: 25.6 PG (ref 26–35)
MCHC RBC AUTO-ENTMCNC: 29.9 G/DL (ref 32–34.5)
MCV RBC AUTO: 85.6 FL (ref 80–99.9)
MONOCYTES NFR BLD: 0.5 K/UL (ref 0.1–0.95)
MONOCYTES NFR BLD: 8 % (ref 2–12)
NEUTROPHILS NFR BLD: 80 % (ref 43–80)
NEUTS SEG NFR BLD: 4.82 K/UL (ref 1.8–7.3)
NITRITE UR QL STRIP: NEGATIVE
PARTIAL THROMBOPLASTIN TIME: 30.9 SEC (ref 24.5–35.1)
PH UR STRIP: 6 [PH] (ref 5–9)
PHOSPHATE SERPL-MCNC: 3.1 MG/DL (ref 2.5–4.5)
PLATELET # BLD AUTO: 141 K/UL (ref 130–450)
PMV BLD AUTO: 10.2 FL (ref 7–12)
POTASSIUM SERPL-SCNC: 3.4 MMOL/L (ref 3.5–5)
PROT SERPL-MCNC: 6.8 G/DL (ref 6.4–8.3)
PROT UR STRIP-MCNC: 30 MG/DL
PROTHROMBIN TIME: 18.8 SEC (ref 9.3–12.4)
RBC # BLD AUTO: 3.95 M/UL (ref 3.5–5.5)
RBC # BLD: ABNORMAL 10*6/UL
RBC #/AREA URNS HPF: ABNORMAL /HPF
RSV RNA NPH QL NAA+NON-PROBE: NOT DETECTED
RV+EV RNA NPH QL NAA+NON-PROBE: NOT DETECTED
S PNEUM AG SPEC QL: NEGATIVE
SARS-COV-2 RNA NPH QL NAA+NON-PROBE: NOT DETECTED
SODIUM SERPL-SCNC: 145 MMOL/L (ref 132–146)
SP GR UR STRIP: 1.02 (ref 1–1.03)
SPECIMEN DESCRIPTION: NORMAL
SPECIMEN SOURCE: NORMAL
UROBILINOGEN UR STRIP-ACNC: 0.2 EU/DL (ref 0–1)
WBC #/AREA URNS HPF: ABNORMAL /HPF
WBC OTHER # BLD: 6 K/UL (ref 4.5–11.5)

## 2024-05-11 PROCEDURE — 6360000004 HC RX CONTRAST MEDICATION: Performed by: RADIOLOGY

## 2024-05-11 PROCEDURE — 80053 COMPREHEN METABOLIC PANEL: CPT

## 2024-05-11 PROCEDURE — 2140000000 HC CCU INTERMEDIATE R&B

## 2024-05-11 PROCEDURE — 82962 GLUCOSE BLOOD TEST: CPT

## 2024-05-11 PROCEDURE — 71260 CT THORAX DX C+: CPT

## 2024-05-11 PROCEDURE — 83735 ASSAY OF MAGNESIUM: CPT

## 2024-05-11 PROCEDURE — 85610 PROTHROMBIN TIME: CPT

## 2024-05-11 PROCEDURE — 2700000000 HC OXYGEN THERAPY PER DAY

## 2024-05-11 PROCEDURE — 99223 1ST HOSP IP/OBS HIGH 75: CPT

## 2024-05-11 PROCEDURE — 2580000003 HC RX 258

## 2024-05-11 PROCEDURE — 94660 CPAP INITIATION&MGMT: CPT

## 2024-05-11 PROCEDURE — 36415 COLL VENOUS BLD VENIPUNCTURE: CPT

## 2024-05-11 PROCEDURE — 6360000002 HC RX W HCPCS

## 2024-05-11 PROCEDURE — 85730 THROMBOPLASTIN TIME PARTIAL: CPT

## 2024-05-11 PROCEDURE — 6370000000 HC RX 637 (ALT 250 FOR IP)

## 2024-05-11 PROCEDURE — 83605 ASSAY OF LACTIC ACID: CPT

## 2024-05-11 PROCEDURE — 84100 ASSAY OF PHOSPHORUS: CPT

## 2024-05-11 PROCEDURE — 83880 ASSAY OF NATRIURETIC PEPTIDE: CPT

## 2024-05-11 PROCEDURE — 85025 COMPLETE CBC W/AUTO DIFF WBC: CPT

## 2024-05-11 PROCEDURE — 94640 AIRWAY INHALATION TREATMENT: CPT

## 2024-05-11 RX ORDER — POTASSIUM CHLORIDE 20 MEQ/1
40 TABLET, EXTENDED RELEASE ORAL ONCE
Status: COMPLETED | OUTPATIENT
Start: 2024-05-11 | End: 2024-05-11

## 2024-05-11 RX ORDER — SODIUM CHLORIDE 0.9 % (FLUSH) 0.9 %
10 SYRINGE (ML) INJECTION PRN
Status: DISCONTINUED | OUTPATIENT
Start: 2024-05-11 | End: 2024-06-03 | Stop reason: HOSPADM

## 2024-05-11 RX ORDER — IPRATROPIUM BROMIDE AND ALBUTEROL SULFATE 2.5; .5 MG/3ML; MG/3ML
1 SOLUTION RESPIRATORY (INHALATION)
Status: DISCONTINUED | OUTPATIENT
Start: 2024-05-11 | End: 2024-06-03 | Stop reason: HOSPADM

## 2024-05-11 RX ORDER — ALBUTEROL SULFATE 2.5 MG/3ML
2.5 SOLUTION RESPIRATORY (INHALATION) EVERY 6 HOURS PRN
Status: DISCONTINUED | OUTPATIENT
Start: 2024-05-11 | End: 2024-06-03 | Stop reason: HOSPADM

## 2024-05-11 RX ORDER — FUROSEMIDE 10 MG/ML
40 INJECTION INTRAMUSCULAR; INTRAVENOUS DAILY
Status: DISCONTINUED | OUTPATIENT
Start: 2024-05-11 | End: 2024-05-12

## 2024-05-11 RX ADMIN — DESMOPRESSIN ACETATE 200 MCG: 0.1 TABLET ORAL at 19:53

## 2024-05-11 RX ADMIN — OMEGA-3-ACID ETHYL ESTERS 2 G: 1 CAPSULE, LIQUID FILLED ORAL at 19:54

## 2024-05-11 RX ADMIN — APIXABAN 5 MG: 5 TABLET, FILM COATED ORAL at 01:29

## 2024-05-11 RX ADMIN — BUDESONIDE INHALATION 500 MCG: 0.5 SUSPENSION RESPIRATORY (INHALATION) at 21:02

## 2024-05-11 RX ADMIN — ARFORMOTEROL TARTRATE 15 MCG: 15 SOLUTION RESPIRATORY (INHALATION) at 09:43

## 2024-05-11 RX ADMIN — LEVETIRACETAM 500 MG: 500 TABLET, FILM COATED ORAL at 01:29

## 2024-05-11 RX ADMIN — LEVOTHYROXINE SODIUM 50 MCG: 0.05 TABLET ORAL at 09:38

## 2024-05-11 RX ADMIN — BUDESONIDE INHALATION 500 MCG: 0.5 SUSPENSION RESPIRATORY (INHALATION) at 09:43

## 2024-05-11 RX ADMIN — DIGOXIN 62.5 MCG: 0.12 TABLET ORAL at 09:36

## 2024-05-11 RX ADMIN — MULTIVITAMIN TABLET 1 TABLET: TABLET at 09:39

## 2024-05-11 RX ADMIN — CYCLOBENZAPRINE 10 MG: 10 TABLET, FILM COATED ORAL at 20:57

## 2024-05-11 RX ADMIN — ARFORMOTEROL TARTRATE 15 MCG: 15 SOLUTION RESPIRATORY (INHALATION) at 21:02

## 2024-05-11 RX ADMIN — SODIUM CHLORIDE, PRESERVATIVE FREE 10 ML: 5 INJECTION INTRAVENOUS at 19:54

## 2024-05-11 RX ADMIN — DULOXETINE HYDROCHLORIDE 60 MG: 60 CAPSULE, DELAYED RELEASE ORAL at 09:36

## 2024-05-11 RX ADMIN — IPRATROPIUM BROMIDE AND ALBUTEROL SULFATE 1 DOSE: .5; 2.5 SOLUTION RESPIRATORY (INHALATION) at 09:44

## 2024-05-11 RX ADMIN — SODIUM CHLORIDE, PRESERVATIVE FREE 10 ML: 5 INJECTION INTRAVENOUS at 01:47

## 2024-05-11 RX ADMIN — Medication 1000 UNITS: at 09:37

## 2024-05-11 RX ADMIN — LEVETIRACETAM 500 MG: 500 TABLET, FILM COATED ORAL at 19:54

## 2024-05-11 RX ADMIN — HYDROCORTISONE 5 MG: 5 TABLET ORAL at 20:01

## 2024-05-11 RX ADMIN — IPRATROPIUM BROMIDE AND ALBUTEROL SULFATE 1 DOSE: .5; 2.5 SOLUTION RESPIRATORY (INHALATION) at 21:02

## 2024-05-11 RX ADMIN — POTASSIUM CHLORIDE 40 MEQ: 1500 TABLET, EXTENDED RELEASE ORAL at 11:53

## 2024-05-11 RX ADMIN — APIXABAN 5 MG: 5 TABLET, FILM COATED ORAL at 09:36

## 2024-05-11 RX ADMIN — IPRATROPIUM BROMIDE AND ALBUTEROL SULFATE 1 DOSE: .5; 2.5 SOLUTION RESPIRATORY (INHALATION) at 16:03

## 2024-05-11 RX ADMIN — METOPROLOL SUCCINATE 50 MG: 50 TABLET, EXTENDED RELEASE ORAL at 01:29

## 2024-05-11 RX ADMIN — IOPAMIDOL 90 ML: 755 INJECTION, SOLUTION INTRAVENOUS at 02:19

## 2024-05-11 RX ADMIN — FERROUS SULFATE TAB 325 MG (65 MG ELEMENTAL FE) 325 MG: 325 (65 FE) TAB at 09:35

## 2024-05-11 RX ADMIN — APIXABAN 5 MG: 5 TABLET, FILM COATED ORAL at 19:54

## 2024-05-11 RX ADMIN — METOPROLOL SUCCINATE 50 MG: 50 TABLET, EXTENDED RELEASE ORAL at 09:38

## 2024-05-11 RX ADMIN — FERROUS SULFATE TAB 325 MG (65 MG ELEMENTAL FE) 325 MG: 325 (65 FE) TAB at 19:53

## 2024-05-11 RX ADMIN — LEVETIRACETAM 500 MG: 500 TABLET, FILM COATED ORAL at 09:38

## 2024-05-11 RX ADMIN — AMLODIPINE BESYLATE 5 MG: 5 TABLET ORAL at 09:37

## 2024-05-11 RX ADMIN — METOPROLOL SUCCINATE 50 MG: 50 TABLET, EXTENDED RELEASE ORAL at 19:53

## 2024-05-11 RX ADMIN — FERROUS SULFATE TAB 325 MG (65 MG ELEMENTAL FE) 325 MG: 325 (65 FE) TAB at 01:29

## 2024-05-11 RX ADMIN — SILDENAFIL 10 MG: 20 TABLET, FILM COATED ORAL at 19:54

## 2024-05-11 ASSESSMENT — PAIN DESCRIPTION - LOCATION
LOCATION: BACK
LOCATION: SHOULDER

## 2024-05-11 ASSESSMENT — PAIN SCALES - GENERAL
PAINLEVEL_OUTOF10: 9
PAINLEVEL_OUTOF10: 8

## 2024-05-11 ASSESSMENT — PAIN DESCRIPTION - ORIENTATION
ORIENTATION: RIGHT;LEFT
ORIENTATION: RIGHT;LEFT

## 2024-05-11 ASSESSMENT — PAIN DESCRIPTION - DESCRIPTORS: DESCRIPTORS: ACHING

## 2024-05-11 NOTE — ED NOTES
Patient states she does not want a repeat ABG done and wants to be stay on the BiPap stating she breathes better with the Pap.

## 2024-05-12 LAB
ALBUMIN SERPL-MCNC: 3.2 G/DL (ref 3.5–5.2)
ALP SERPL-CCNC: 147 U/L (ref 35–104)
ALT SERPL-CCNC: 24 U/L (ref 0–32)
ANION GAP SERPL CALCULATED.3IONS-SCNC: 10 MMOL/L (ref 7–16)
AST SERPL-CCNC: 21 U/L (ref 0–31)
BASOPHILS # BLD: 0.02 K/UL (ref 0–0.2)
BASOPHILS NFR BLD: 0 % (ref 0–2)
BILIRUB SERPL-MCNC: 0.6 MG/DL (ref 0–1.2)
BNP SERPL-MCNC: 6447 PG/ML (ref 0–125)
BUN SERPL-MCNC: 18 MG/DL (ref 6–23)
CALCIUM SERPL-MCNC: 9 MG/DL (ref 8.6–10.2)
CHLORIDE SERPL-SCNC: 102 MMOL/L (ref 98–107)
CO2 SERPL-SCNC: 29 MMOL/L (ref 22–29)
CREAT SERPL-MCNC: 1 MG/DL (ref 0.5–1)
EOSINOPHIL # BLD: 0.18 K/UL (ref 0.05–0.5)
EOSINOPHILS RELATIVE PERCENT: 3 % (ref 0–6)
ERYTHROCYTE [DISTWIDTH] IN BLOOD BY AUTOMATED COUNT: 16.4 % (ref 11.5–15)
GFR, ESTIMATED: 62 ML/MIN/1.73M2
GLUCOSE BLD-MCNC: 101 MG/DL (ref 74–99)
GLUCOSE BLD-MCNC: 105 MG/DL (ref 74–99)
GLUCOSE BLD-MCNC: 116 MG/DL (ref 74–99)
GLUCOSE BLD-MCNC: 192 MG/DL (ref 74–99)
GLUCOSE SERPL-MCNC: 84 MG/DL (ref 74–99)
HCT VFR BLD AUTO: 29.5 % (ref 34–48)
HGB BLD-MCNC: 8.9 G/DL (ref 11.5–15.5)
IMM GRANULOCYTES # BLD AUTO: <0.03 K/UL (ref 0–0.58)
IMM GRANULOCYTES NFR BLD: 0 % (ref 0–5)
LYMPHOCYTES NFR BLD: 0.59 K/UL (ref 1.5–4)
LYMPHOCYTES RELATIVE PERCENT: 10 % (ref 20–42)
MCH RBC QN AUTO: 25.9 PG (ref 26–35)
MCHC RBC AUTO-ENTMCNC: 30.2 G/DL (ref 32–34.5)
MCV RBC AUTO: 85.8 FL (ref 80–99.9)
MICROORGANISM SPEC CULT: ABNORMAL
MICROORGANISM SPEC CULT: NORMAL
MONOCYTES NFR BLD: 0.58 K/UL (ref 0.1–0.95)
MONOCYTES NFR BLD: 10 % (ref 2–12)
NEUTROPHILS NFR BLD: 75 % (ref 43–80)
NEUTS SEG NFR BLD: 4.26 K/UL (ref 1.8–7.3)
PLATELET # BLD AUTO: 133 K/UL (ref 130–450)
PLATELET CONFIRMATION: NORMAL
PMV BLD AUTO: 10.9 FL (ref 7–12)
POTASSIUM SERPL-SCNC: 4.2 MMOL/L (ref 3.5–5)
PROT SERPL-MCNC: 5.8 G/DL (ref 6.4–8.3)
RBC # BLD AUTO: 3.44 M/UL (ref 3.5–5.5)
RBC # BLD: ABNORMAL 10*6/UL
SODIUM SERPL-SCNC: 141 MMOL/L (ref 132–146)
SPECIMEN DESCRIPTION: ABNORMAL
SPECIMEN DESCRIPTION: NORMAL
WBC OTHER # BLD: 5.7 K/UL (ref 4.5–11.5)

## 2024-05-12 PROCEDURE — 2140000000 HC CCU INTERMEDIATE R&B

## 2024-05-12 PROCEDURE — 6370000000 HC RX 637 (ALT 250 FOR IP)

## 2024-05-12 PROCEDURE — 94660 CPAP INITIATION&MGMT: CPT

## 2024-05-12 PROCEDURE — 99232 SBSQ HOSP IP/OBS MODERATE 35: CPT

## 2024-05-12 PROCEDURE — 85025 COMPLETE CBC W/AUTO DIFF WBC: CPT

## 2024-05-12 PROCEDURE — 6360000002 HC RX W HCPCS

## 2024-05-12 PROCEDURE — 2700000000 HC OXYGEN THERAPY PER DAY

## 2024-05-12 PROCEDURE — 82962 GLUCOSE BLOOD TEST: CPT

## 2024-05-12 PROCEDURE — 94640 AIRWAY INHALATION TREATMENT: CPT

## 2024-05-12 PROCEDURE — 80053 COMPREHEN METABOLIC PANEL: CPT

## 2024-05-12 PROCEDURE — 36415 COLL VENOUS BLD VENIPUNCTURE: CPT

## 2024-05-12 PROCEDURE — 83880 ASSAY OF NATRIURETIC PEPTIDE: CPT

## 2024-05-12 PROCEDURE — 2580000003 HC RX 258

## 2024-05-12 RX ORDER — FUROSEMIDE 10 MG/ML
40 INJECTION INTRAMUSCULAR; INTRAVENOUS 2 TIMES DAILY
Status: DISCONTINUED | OUTPATIENT
Start: 2024-05-12 | End: 2024-05-13

## 2024-05-12 RX ORDER — LIDOCAINE 4 G/G
1 PATCH TOPICAL DAILY
Status: DISCONTINUED | OUTPATIENT
Start: 2024-05-12 | End: 2024-06-03 | Stop reason: HOSPADM

## 2024-05-12 RX ADMIN — APIXABAN 5 MG: 5 TABLET, FILM COATED ORAL at 20:33

## 2024-05-12 RX ADMIN — DESMOPRESSIN ACETATE 200 MCG: 0.1 TABLET ORAL at 20:40

## 2024-05-12 RX ADMIN — DULOXETINE HYDROCHLORIDE 60 MG: 60 CAPSULE, DELAYED RELEASE ORAL at 11:58

## 2024-05-12 RX ADMIN — OMEGA-3-ACID ETHYL ESTERS 2 G: 1 CAPSULE, LIQUID FILLED ORAL at 20:39

## 2024-05-12 RX ADMIN — ARFORMOTEROL TARTRATE 15 MCG: 15 SOLUTION RESPIRATORY (INHALATION) at 18:57

## 2024-05-12 RX ADMIN — SILDENAFIL 10 MG: 20 TABLET, FILM COATED ORAL at 20:39

## 2024-05-12 RX ADMIN — ACETAMINOPHEN 650 MG: 325 TABLET ORAL at 12:42

## 2024-05-12 RX ADMIN — SILDENAFIL 10 MG: 20 TABLET, FILM COATED ORAL at 11:56

## 2024-05-12 RX ADMIN — FUROSEMIDE 40 MG: 10 INJECTION, SOLUTION INTRAMUSCULAR; INTRAVENOUS at 12:00

## 2024-05-12 RX ADMIN — IPRATROPIUM BROMIDE AND ALBUTEROL SULFATE 1 DOSE: .5; 2.5 SOLUTION RESPIRATORY (INHALATION) at 15:50

## 2024-05-12 RX ADMIN — DICLOFENAC SODIUM 2 G: 10 GEL TOPICAL at 20:33

## 2024-05-12 RX ADMIN — Medication 1000 UNITS: at 11:57

## 2024-05-12 RX ADMIN — IPRATROPIUM BROMIDE AND ALBUTEROL SULFATE 1 DOSE: .5; 2.5 SOLUTION RESPIRATORY (INHALATION) at 12:19

## 2024-05-12 RX ADMIN — METOPROLOL SUCCINATE 50 MG: 50 TABLET, EXTENDED RELEASE ORAL at 20:28

## 2024-05-12 RX ADMIN — FUROSEMIDE 40 MG: 10 INJECTION, SOLUTION INTRAMUSCULAR; INTRAVENOUS at 17:34

## 2024-05-12 RX ADMIN — LEVOTHYROXINE SODIUM 50 MCG: 0.05 TABLET ORAL at 11:58

## 2024-05-12 RX ADMIN — DESMOPRESSIN ACETATE 200 MCG: 0.1 TABLET ORAL at 11:58

## 2024-05-12 RX ADMIN — METOPROLOL SUCCINATE 50 MG: 50 TABLET, EXTENDED RELEASE ORAL at 11:57

## 2024-05-12 RX ADMIN — AMLODIPINE BESYLATE 5 MG: 5 TABLET ORAL at 11:57

## 2024-05-12 RX ADMIN — IPRATROPIUM BROMIDE AND ALBUTEROL SULFATE 1 DOSE: .5; 2.5 SOLUTION RESPIRATORY (INHALATION) at 18:57

## 2024-05-12 RX ADMIN — BUDESONIDE INHALATION 500 MCG: 0.5 SUSPENSION RESPIRATORY (INHALATION) at 08:26

## 2024-05-12 RX ADMIN — OMEGA-3-ACID ETHYL ESTERS 2 G: 1 CAPSULE, LIQUID FILLED ORAL at 11:56

## 2024-05-12 RX ADMIN — APIXABAN 5 MG: 5 TABLET, FILM COATED ORAL at 11:57

## 2024-05-12 RX ADMIN — IPRATROPIUM BROMIDE AND ALBUTEROL SULFATE 1 DOSE: .5; 2.5 SOLUTION RESPIRATORY (INHALATION) at 08:26

## 2024-05-12 RX ADMIN — FERROUS SULFATE TAB 325 MG (65 MG ELEMENTAL FE) 325 MG: 325 (65 FE) TAB at 11:57

## 2024-05-12 RX ADMIN — FERROUS SULFATE TAB 325 MG (65 MG ELEMENTAL FE) 325 MG: 325 (65 FE) TAB at 20:28

## 2024-05-12 RX ADMIN — HYDROCORTISONE 5 MG: 5 TABLET ORAL at 20:39

## 2024-05-12 RX ADMIN — BUDESONIDE INHALATION 500 MCG: 0.5 SUSPENSION RESPIRATORY (INHALATION) at 18:57

## 2024-05-12 RX ADMIN — DICLOFENAC SODIUM 2 G: 10 GEL TOPICAL at 11:59

## 2024-05-12 RX ADMIN — SILDENAFIL 10 MG: 20 TABLET, FILM COATED ORAL at 14:48

## 2024-05-12 RX ADMIN — SODIUM CHLORIDE, PRESERVATIVE FREE 10 ML: 5 INJECTION INTRAVENOUS at 20:33

## 2024-05-12 RX ADMIN — SODIUM CHLORIDE, PRESERVATIVE FREE 10 ML: 5 INJECTION INTRAVENOUS at 11:59

## 2024-05-12 RX ADMIN — LEVETIRACETAM 500 MG: 500 TABLET, FILM COATED ORAL at 20:33

## 2024-05-12 RX ADMIN — LEVETIRACETAM 500 MG: 500 TABLET, FILM COATED ORAL at 11:58

## 2024-05-12 RX ADMIN — HYDROCORTISONE 10 MG: 5 TABLET ORAL at 11:56

## 2024-05-12 RX ADMIN — DIGOXIN 62.5 MCG: 0.12 TABLET ORAL at 11:58

## 2024-05-12 RX ADMIN — ARFORMOTEROL TARTRATE 15 MCG: 15 SOLUTION RESPIRATORY (INHALATION) at 08:26

## 2024-05-12 RX ADMIN — MULTIVITAMIN TABLET 1 TABLET: TABLET at 11:58

## 2024-05-12 ASSESSMENT — PAIN SCALES - GENERAL: PAINLEVEL_OUTOF10: 7

## 2024-05-13 ENCOUNTER — APPOINTMENT (OUTPATIENT)
Dept: GENERAL RADIOLOGY | Age: 69
DRG: 208 | End: 2024-05-13
Payer: MEDICARE

## 2024-05-13 LAB
ALBUMIN SERPL-MCNC: 3.3 G/DL (ref 3.5–5.2)
ALP SERPL-CCNC: 158 U/L (ref 35–104)
ALT SERPL-CCNC: 25 U/L (ref 0–32)
ANION GAP SERPL CALCULATED.3IONS-SCNC: 14 MMOL/L (ref 7–16)
ANION GAP SERPL CALCULATED.3IONS-SCNC: 16 MMOL/L (ref 7–16)
AST SERPL-CCNC: 25 U/L (ref 0–31)
BASOPHILS # BLD: 0.02 K/UL (ref 0–0.2)
BASOPHILS NFR BLD: 0 % (ref 0–2)
BILIRUB SERPL-MCNC: 0.4 MG/DL (ref 0–1.2)
BNP SERPL-MCNC: 6546 PG/ML (ref 0–125)
BUN SERPL-MCNC: 26 MG/DL (ref 6–23)
BUN SERPL-MCNC: 28 MG/DL (ref 6–23)
CALCIUM SERPL-MCNC: 9.1 MG/DL (ref 8.6–10.2)
CALCIUM SERPL-MCNC: 9.5 MG/DL (ref 8.6–10.2)
CHLORIDE SERPL-SCNC: 102 MMOL/L (ref 98–107)
CHLORIDE SERPL-SCNC: 99 MMOL/L (ref 98–107)
CO2 SERPL-SCNC: 27 MMOL/L (ref 22–29)
CO2 SERPL-SCNC: 27 MMOL/L (ref 22–29)
CREAT SERPL-MCNC: 1.3 MG/DL (ref 0.5–1)
CREAT SERPL-MCNC: 1.3 MG/DL (ref 0.5–1)
CREAT UR-MCNC: 54.8 MG/DL (ref 29–226)
EOSINOPHIL # BLD: 0.19 K/UL (ref 0.05–0.5)
EOSINOPHILS RELATIVE PERCENT: 3 % (ref 0–6)
ERYTHROCYTE [DISTWIDTH] IN BLOOD BY AUTOMATED COUNT: 16.3 % (ref 11.5–15)
GFR, ESTIMATED: 46 ML/MIN/1.73M2
GFR, ESTIMATED: 46 ML/MIN/1.73M2
GLUCOSE BLD-MCNC: 106 MG/DL (ref 74–99)
GLUCOSE BLD-MCNC: 135 MG/DL (ref 74–99)
GLUCOSE BLD-MCNC: 153 MG/DL (ref 74–99)
GLUCOSE BLD-MCNC: 193 MG/DL (ref 74–99)
GLUCOSE SERPL-MCNC: 97 MG/DL (ref 74–99)
GLUCOSE SERPL-MCNC: 97 MG/DL (ref 74–99)
HCT VFR BLD AUTO: 28.6 % (ref 34–48)
HGB BLD-MCNC: 8.8 G/DL (ref 11.5–15.5)
IMM GRANULOCYTES # BLD AUTO: <0.03 K/UL (ref 0–0.58)
IMM GRANULOCYTES NFR BLD: 0 % (ref 0–5)
LYMPHOCYTES NFR BLD: 0.46 K/UL (ref 1.5–4)
LYMPHOCYTES RELATIVE PERCENT: 8 % (ref 20–42)
MCH RBC QN AUTO: 26.3 PG (ref 26–35)
MCHC RBC AUTO-ENTMCNC: 30.8 G/DL (ref 32–34.5)
MCV RBC AUTO: 85.6 FL (ref 80–99.9)
MONOCYTES NFR BLD: 0.57 K/UL (ref 0.1–0.95)
MONOCYTES NFR BLD: 10 % (ref 2–12)
NEUTROPHILS NFR BLD: 77 % (ref 43–80)
NEUTS SEG NFR BLD: 4.28 K/UL (ref 1.8–7.3)
PLATELET # BLD AUTO: 142 K/UL (ref 130–450)
PMV BLD AUTO: 10.6 FL (ref 7–12)
POTASSIUM SERPL-SCNC: 4 MMOL/L (ref 3.5–5)
POTASSIUM SERPL-SCNC: 4.2 MMOL/L (ref 3.5–5)
PROT SERPL-MCNC: 5.9 G/DL (ref 6.4–8.3)
RBC # BLD AUTO: 3.34 M/UL (ref 3.5–5.5)
RBC # BLD: ABNORMAL 10*6/UL
SODIUM SERPL-SCNC: 142 MMOL/L (ref 132–146)
SODIUM SERPL-SCNC: 143 MMOL/L (ref 132–146)
UUN UR-MCNC: 325 MG/DL (ref 800–1666)
WBC OTHER # BLD: 5.5 K/UL (ref 4.5–11.5)

## 2024-05-13 PROCEDURE — 2140000000 HC CCU INTERMEDIATE R&B

## 2024-05-13 PROCEDURE — 82570 ASSAY OF URINE CREATININE: CPT

## 2024-05-13 PROCEDURE — 2580000003 HC RX 258

## 2024-05-13 PROCEDURE — 83880 ASSAY OF NATRIURETIC PEPTIDE: CPT

## 2024-05-13 PROCEDURE — 94640 AIRWAY INHALATION TREATMENT: CPT

## 2024-05-13 PROCEDURE — 84540 ASSAY OF URINE/UREA-N: CPT

## 2024-05-13 PROCEDURE — 6370000000 HC RX 637 (ALT 250 FOR IP)

## 2024-05-13 PROCEDURE — 71045 X-RAY EXAM CHEST 1 VIEW: CPT

## 2024-05-13 PROCEDURE — 85025 COMPLETE CBC W/AUTO DIFF WBC: CPT

## 2024-05-13 PROCEDURE — 80048 BASIC METABOLIC PNL TOTAL CA: CPT

## 2024-05-13 PROCEDURE — 80053 COMPREHEN METABOLIC PANEL: CPT

## 2024-05-13 PROCEDURE — 82962 GLUCOSE BLOOD TEST: CPT

## 2024-05-13 PROCEDURE — 2700000000 HC OXYGEN THERAPY PER DAY

## 2024-05-13 PROCEDURE — 99232 SBSQ HOSP IP/OBS MODERATE 35: CPT | Performed by: FAMILY MEDICINE

## 2024-05-13 PROCEDURE — 36415 COLL VENOUS BLD VENIPUNCTURE: CPT

## 2024-05-13 PROCEDURE — 6360000002 HC RX W HCPCS

## 2024-05-13 PROCEDURE — 94660 CPAP INITIATION&MGMT: CPT

## 2024-05-13 RX ORDER — ACETAMINOPHEN 650 MG/1
650 SUPPOSITORY RECTAL EVERY 6 HOURS SCHEDULED
Status: DISCONTINUED | OUTPATIENT
Start: 2024-05-13 | End: 2024-05-13

## 2024-05-13 RX ORDER — ACETAMINOPHEN 325 MG/1
650 TABLET ORAL EVERY 8 HOURS SCHEDULED
Status: DISCONTINUED | OUTPATIENT
Start: 2024-05-13 | End: 2024-06-03

## 2024-05-13 RX ORDER — ACETAMINOPHEN 650 MG/1
650 SUPPOSITORY RECTAL EVERY 8 HOURS SCHEDULED
Status: DISCONTINUED | OUTPATIENT
Start: 2024-05-13 | End: 2024-06-03

## 2024-05-13 RX ORDER — ACETAMINOPHEN 325 MG/1
650 TABLET ORAL EVERY 6 HOURS SCHEDULED
Status: DISCONTINUED | OUTPATIENT
Start: 2024-05-13 | End: 2024-05-13

## 2024-05-13 RX ORDER — FUROSEMIDE 10 MG/ML
40 INJECTION INTRAMUSCULAR; INTRAVENOUS DAILY
Status: DISCONTINUED | OUTPATIENT
Start: 2024-05-14 | End: 2024-05-15

## 2024-05-13 RX ADMIN — ACETAMINOPHEN 650 MG: 325 TABLET ORAL at 02:05

## 2024-05-13 RX ADMIN — IPRATROPIUM BROMIDE AND ALBUTEROL SULFATE 1 DOSE: .5; 2.5 SOLUTION RESPIRATORY (INHALATION) at 21:34

## 2024-05-13 RX ADMIN — AMLODIPINE BESYLATE 5 MG: 5 TABLET ORAL at 09:08

## 2024-05-13 RX ADMIN — IPRATROPIUM BROMIDE AND ALBUTEROL SULFATE 1 DOSE: .5; 2.5 SOLUTION RESPIRATORY (INHALATION) at 18:10

## 2024-05-13 RX ADMIN — METOPROLOL SUCCINATE 50 MG: 50 TABLET, EXTENDED RELEASE ORAL at 20:36

## 2024-05-13 RX ADMIN — BUDESONIDE INHALATION 500 MCG: 0.5 SUSPENSION RESPIRATORY (INHALATION) at 08:21

## 2024-05-13 RX ADMIN — FERROUS SULFATE TAB 325 MG (65 MG ELEMENTAL FE) 325 MG: 325 (65 FE) TAB at 09:08

## 2024-05-13 RX ADMIN — SILDENAFIL 10 MG: 20 TABLET, FILM COATED ORAL at 20:36

## 2024-05-13 RX ADMIN — DICLOFENAC SODIUM 2 G: 10 GEL TOPICAL at 20:46

## 2024-05-13 RX ADMIN — METOPROLOL SUCCINATE 50 MG: 50 TABLET, EXTENDED RELEASE ORAL at 09:08

## 2024-05-13 RX ADMIN — APIXABAN 5 MG: 5 TABLET, FILM COATED ORAL at 20:37

## 2024-05-13 RX ADMIN — DULOXETINE HYDROCHLORIDE 60 MG: 60 CAPSULE, DELAYED RELEASE ORAL at 09:08

## 2024-05-13 RX ADMIN — DIGOXIN 62.5 MCG: 0.12 TABLET ORAL at 09:07

## 2024-05-13 RX ADMIN — SILDENAFIL 10 MG: 20 TABLET, FILM COATED ORAL at 13:04

## 2024-05-13 RX ADMIN — DESMOPRESSIN ACETATE 200 MCG: 0.1 TABLET ORAL at 09:10

## 2024-05-13 RX ADMIN — BUDESONIDE INHALATION 500 MCG: 0.5 SUSPENSION RESPIRATORY (INHALATION) at 21:34

## 2024-05-13 RX ADMIN — CYCLOBENZAPRINE 10 MG: 10 TABLET, FILM COATED ORAL at 10:35

## 2024-05-13 RX ADMIN — ARFORMOTEROL TARTRATE 15 MCG: 15 SOLUTION RESPIRATORY (INHALATION) at 08:21

## 2024-05-13 RX ADMIN — LEVETIRACETAM 500 MG: 500 TABLET, FILM COATED ORAL at 09:09

## 2024-05-13 RX ADMIN — OMEGA-3-ACID ETHYL ESTERS 2 G: 1 CAPSULE, LIQUID FILLED ORAL at 09:11

## 2024-05-13 RX ADMIN — MULTIVITAMIN TABLET 1 TABLET: TABLET at 09:12

## 2024-05-13 RX ADMIN — APIXABAN 5 MG: 5 TABLET, FILM COATED ORAL at 09:09

## 2024-05-13 RX ADMIN — ARFORMOTEROL TARTRATE 15 MCG: 15 SOLUTION RESPIRATORY (INHALATION) at 21:34

## 2024-05-13 RX ADMIN — FERROUS SULFATE TAB 325 MG (65 MG ELEMENTAL FE) 325 MG: 325 (65 FE) TAB at 20:37

## 2024-05-13 RX ADMIN — IPRATROPIUM BROMIDE AND ALBUTEROL SULFATE 1 DOSE: .5; 2.5 SOLUTION RESPIRATORY (INHALATION) at 11:17

## 2024-05-13 RX ADMIN — IPRATROPIUM BROMIDE AND ALBUTEROL SULFATE 1 DOSE: .5; 2.5 SOLUTION RESPIRATORY (INHALATION) at 08:21

## 2024-05-13 RX ADMIN — ACETAMINOPHEN 650 MG: 325 TABLET ORAL at 13:04

## 2024-05-13 RX ADMIN — CYCLOBENZAPRINE 10 MG: 10 TABLET, FILM COATED ORAL at 20:44

## 2024-05-13 RX ADMIN — HYDROCORTISONE 5 MG: 5 TABLET ORAL at 20:37

## 2024-05-13 RX ADMIN — DICLOFENAC SODIUM 2 G: 10 GEL TOPICAL at 09:14

## 2024-05-13 RX ADMIN — SODIUM CHLORIDE, PRESERVATIVE FREE 10 ML: 5 INJECTION INTRAVENOUS at 20:35

## 2024-05-13 RX ADMIN — ACETAMINOPHEN 650 MG: 325 TABLET ORAL at 20:37

## 2024-05-13 RX ADMIN — DESMOPRESSIN ACETATE 200 MCG: 0.1 TABLET ORAL at 20:35

## 2024-05-13 RX ADMIN — LEVOTHYROXINE SODIUM 50 MCG: 0.05 TABLET ORAL at 09:09

## 2024-05-13 RX ADMIN — LEVETIRACETAM 500 MG: 500 TABLET, FILM COATED ORAL at 20:35

## 2024-05-13 RX ADMIN — SODIUM CHLORIDE, PRESERVATIVE FREE 10 ML: 5 INJECTION INTRAVENOUS at 09:08

## 2024-05-13 RX ADMIN — OMEGA-3-ACID ETHYL ESTERS 2 G: 1 CAPSULE, LIQUID FILLED ORAL at 20:35

## 2024-05-13 RX ADMIN — Medication 1000 UNITS: at 09:09

## 2024-05-13 RX ADMIN — HYDROCORTISONE 10 MG: 5 TABLET ORAL at 09:12

## 2024-05-13 RX ADMIN — SILDENAFIL 10 MG: 20 TABLET, FILM COATED ORAL at 09:13

## 2024-05-13 ASSESSMENT — PAIN DESCRIPTION - DESCRIPTORS
DESCRIPTORS: ACHING
DESCRIPTORS: ACHING;BURNING;CRAMPING

## 2024-05-13 ASSESSMENT — PAIN - FUNCTIONAL ASSESSMENT
PAIN_FUNCTIONAL_ASSESSMENT: PREVENTS OR INTERFERES SOME ACTIVE ACTIVITIES AND ADLS
PAIN_FUNCTIONAL_ASSESSMENT: PREVENTS OR INTERFERES WITH ALL ACTIVE AND SOME PASSIVE ACTIVITIES

## 2024-05-13 ASSESSMENT — PAIN DESCRIPTION - LOCATION
LOCATION: SHOULDER
LOCATION: BACK;GENERALIZED

## 2024-05-13 ASSESSMENT — PAIN DESCRIPTION - ORIENTATION
ORIENTATION: RIGHT;LEFT
ORIENTATION: RIGHT;LEFT;LOWER

## 2024-05-13 ASSESSMENT — PAIN SCALES - GENERAL
PAINLEVEL_OUTOF10: 9
PAINLEVEL_OUTOF10: 8

## 2024-05-14 LAB
ALBUMIN SERPL-MCNC: 3.2 G/DL (ref 3.5–5.2)
ALK PHOS BONE SPECIFIC: 46 U/L (ref 0–55)
ALK PHOS OTHER CALC: 0 U/L
ALK PHOSPHATASE: 164 U/L (ref 40–120)
ALKALINE PHOSPHATASE LIVER FRACTION: 118 U/L (ref 0–94)
ALP SERPL-CCNC: 180 U/L (ref 35–104)
ALT SERPL-CCNC: 49 U/L (ref 0–32)
ANION GAP SERPL CALCULATED.3IONS-SCNC: 9 MMOL/L (ref 7–16)
AST SERPL-CCNC: 87 U/L (ref 0–31)
BASOPHILS # BLD: 0.04 K/UL (ref 0–0.2)
BASOPHILS NFR BLD: 1 % (ref 0–2)
BILIRUB SERPL-MCNC: 0.3 MG/DL (ref 0–1.2)
BNP SERPL-MCNC: 6672 PG/ML (ref 0–125)
BUN SERPL-MCNC: 34 MG/DL (ref 6–23)
CALCIUM SERPL-MCNC: 8.6 MG/DL (ref 8.6–10.2)
CHLORIDE SERPL-SCNC: 99 MMOL/L (ref 98–107)
CO2 SERPL-SCNC: 31 MMOL/L (ref 22–29)
CREAT SERPL-MCNC: 1.5 MG/DL (ref 0.5–1)
EOSINOPHIL # BLD: 0.39 K/UL (ref 0.05–0.5)
EOSINOPHILS RELATIVE PERCENT: 8 % (ref 0–6)
ERYTHROCYTE [DISTWIDTH] IN BLOOD BY AUTOMATED COUNT: 16.1 % (ref 11.5–15)
GFR, ESTIMATED: 39 ML/MIN/1.73M2
GLUCOSE BLD-MCNC: 136 MG/DL (ref 74–99)
GLUCOSE SERPL-MCNC: 102 MG/DL (ref 74–99)
HCT VFR BLD AUTO: 28.4 % (ref 34–48)
HGB BLD-MCNC: 8.6 G/DL (ref 11.5–15.5)
LYMPHOCYTES NFR BLD: 0.26 K/UL (ref 1.5–4)
LYMPHOCYTES RELATIVE PERCENT: 5 % (ref 20–42)
MCH RBC QN AUTO: 26.1 PG (ref 26–35)
MCHC RBC AUTO-ENTMCNC: 30.3 G/DL (ref 32–34.5)
MCV RBC AUTO: 86.1 FL (ref 80–99.9)
MONOCYTES NFR BLD: 0.44 K/UL (ref 0.1–0.95)
MONOCYTES NFR BLD: 9 % (ref 2–12)
NEUTROPHILS NFR BLD: 77 % (ref 43–80)
NEUTS SEG NFR BLD: 3.86 K/UL (ref 1.8–7.3)
PLATELET # BLD AUTO: 148 K/UL (ref 130–450)
PMV BLD AUTO: 10.7 FL (ref 7–12)
POTASSIUM SERPL-SCNC: 4.3 MMOL/L (ref 3.5–5)
PROT SERPL-MCNC: 5.9 G/DL (ref 6.4–8.3)
RBC # BLD AUTO: 3.3 M/UL (ref 3.5–5.5)
RBC # BLD: ABNORMAL 10*6/UL
SODIUM SERPL-SCNC: 139 MMOL/L (ref 132–146)
WBC OTHER # BLD: 5 K/UL (ref 4.5–11.5)

## 2024-05-14 PROCEDURE — 94640 AIRWAY INHALATION TREATMENT: CPT

## 2024-05-14 PROCEDURE — 6370000000 HC RX 637 (ALT 250 FOR IP)

## 2024-05-14 PROCEDURE — 85025 COMPLETE CBC W/AUTO DIFF WBC: CPT

## 2024-05-14 PROCEDURE — 99232 SBSQ HOSP IP/OBS MODERATE 35: CPT | Performed by: FAMILY MEDICINE

## 2024-05-14 PROCEDURE — 83880 ASSAY OF NATRIURETIC PEPTIDE: CPT

## 2024-05-14 PROCEDURE — 94660 CPAP INITIATION&MGMT: CPT

## 2024-05-14 PROCEDURE — 2700000000 HC OXYGEN THERAPY PER DAY

## 2024-05-14 PROCEDURE — 80053 COMPREHEN METABOLIC PANEL: CPT

## 2024-05-14 PROCEDURE — 82962 GLUCOSE BLOOD TEST: CPT

## 2024-05-14 PROCEDURE — 2140000000 HC CCU INTERMEDIATE R&B

## 2024-05-14 PROCEDURE — 2580000003 HC RX 258

## 2024-05-14 PROCEDURE — 36415 COLL VENOUS BLD VENIPUNCTURE: CPT

## 2024-05-14 PROCEDURE — 6360000002 HC RX W HCPCS

## 2024-05-14 RX ADMIN — IPRATROPIUM BROMIDE AND ALBUTEROL SULFATE 1 DOSE: .5; 2.5 SOLUTION RESPIRATORY (INHALATION) at 07:46

## 2024-05-14 RX ADMIN — METOPROLOL SUCCINATE 50 MG: 50 TABLET, EXTENDED RELEASE ORAL at 21:37

## 2024-05-14 RX ADMIN — IPRATROPIUM BROMIDE AND ALBUTEROL SULFATE 1 DOSE: .5; 2.5 SOLUTION RESPIRATORY (INHALATION) at 19:58

## 2024-05-14 RX ADMIN — HYDROCORTISONE 5 MG: 5 TABLET ORAL at 21:35

## 2024-05-14 RX ADMIN — FERROUS SULFATE TAB 325 MG (65 MG ELEMENTAL FE) 325 MG: 325 (65 FE) TAB at 10:40

## 2024-05-14 RX ADMIN — APIXABAN 5 MG: 5 TABLET, FILM COATED ORAL at 21:36

## 2024-05-14 RX ADMIN — ARFORMOTEROL TARTRATE 15 MCG: 15 SOLUTION RESPIRATORY (INHALATION) at 19:58

## 2024-05-14 RX ADMIN — SILDENAFIL 10 MG: 20 TABLET, FILM COATED ORAL at 11:46

## 2024-05-14 RX ADMIN — DULOXETINE HYDROCHLORIDE 60 MG: 60 CAPSULE, DELAYED RELEASE ORAL at 11:43

## 2024-05-14 RX ADMIN — CYCLOBENZAPRINE 10 MG: 10 TABLET, FILM COATED ORAL at 21:34

## 2024-05-14 RX ADMIN — BUDESONIDE INHALATION 500 MCG: 0.5 SUSPENSION RESPIRATORY (INHALATION) at 19:58

## 2024-05-14 RX ADMIN — HYDROCORTISONE 10 MG: 5 TABLET ORAL at 11:45

## 2024-05-14 RX ADMIN — Medication 1000 UNITS: at 11:44

## 2024-05-14 RX ADMIN — IPRATROPIUM BROMIDE AND ALBUTEROL SULFATE 1 DOSE: .5; 2.5 SOLUTION RESPIRATORY (INHALATION) at 11:42

## 2024-05-14 RX ADMIN — APIXABAN 5 MG: 5 TABLET, FILM COATED ORAL at 11:44

## 2024-05-14 RX ADMIN — SILDENAFIL 10 MG: 20 TABLET, FILM COATED ORAL at 21:38

## 2024-05-14 RX ADMIN — ACETAMINOPHEN 650 MG: 325 TABLET ORAL at 06:37

## 2024-05-14 RX ADMIN — METOPROLOL SUCCINATE 50 MG: 50 TABLET, EXTENDED RELEASE ORAL at 11:49

## 2024-05-14 RX ADMIN — DIGOXIN 62.5 MCG: 0.12 TABLET ORAL at 11:44

## 2024-05-14 RX ADMIN — IPRATROPIUM BROMIDE AND ALBUTEROL SULFATE 1 DOSE: .5; 2.5 SOLUTION RESPIRATORY (INHALATION) at 15:58

## 2024-05-14 RX ADMIN — LEVETIRACETAM 500 MG: 500 TABLET, FILM COATED ORAL at 11:43

## 2024-05-14 RX ADMIN — SODIUM CHLORIDE, PRESERVATIVE FREE 10 ML: 5 INJECTION INTRAVENOUS at 21:38

## 2024-05-14 RX ADMIN — OMEGA-3-ACID ETHYL ESTERS 2 G: 1 CAPSULE, LIQUID FILLED ORAL at 21:35

## 2024-05-14 RX ADMIN — MULTIVITAMIN TABLET 1 TABLET: TABLET at 10:41

## 2024-05-14 RX ADMIN — LEVETIRACETAM 500 MG: 500 TABLET, FILM COATED ORAL at 21:36

## 2024-05-14 RX ADMIN — ARFORMOTEROL TARTRATE 15 MCG: 15 SOLUTION RESPIRATORY (INHALATION) at 07:46

## 2024-05-14 RX ADMIN — DICLOFENAC SODIUM 2 G: 10 GEL TOPICAL at 21:42

## 2024-05-14 RX ADMIN — CYCLOBENZAPRINE 10 MG: 10 TABLET, FILM COATED ORAL at 11:49

## 2024-05-14 RX ADMIN — FERROUS SULFATE TAB 325 MG (65 MG ELEMENTAL FE) 325 MG: 325 (65 FE) TAB at 21:36

## 2024-05-14 RX ADMIN — ACETAMINOPHEN 650 MG: 325 TABLET ORAL at 21:36

## 2024-05-14 RX ADMIN — LEVOTHYROXINE SODIUM 50 MCG: 0.05 TABLET ORAL at 11:44

## 2024-05-14 RX ADMIN — DESMOPRESSIN ACETATE 200 MCG: 0.1 TABLET ORAL at 21:36

## 2024-05-14 RX ADMIN — BUDESONIDE INHALATION 500 MCG: 0.5 SUSPENSION RESPIRATORY (INHALATION) at 07:46

## 2024-05-14 RX ADMIN — DESMOPRESSIN ACETATE 200 MCG: 0.1 TABLET ORAL at 11:44

## 2024-05-14 RX ADMIN — AMLODIPINE BESYLATE 5 MG: 5 TABLET ORAL at 11:46

## 2024-05-14 RX ADMIN — OMEGA-3-ACID ETHYL ESTERS 2 G: 1 CAPSULE, LIQUID FILLED ORAL at 11:45

## 2024-05-14 ASSESSMENT — PAIN - FUNCTIONAL ASSESSMENT: PAIN_FUNCTIONAL_ASSESSMENT: PREVENTS OR INTERFERES SOME ACTIVE ACTIVITIES AND ADLS

## 2024-05-14 ASSESSMENT — PAIN SCALES - GENERAL
PAINLEVEL_OUTOF10: 8
PAINLEVEL_OUTOF10: 5

## 2024-05-14 ASSESSMENT — PAIN DESCRIPTION - FREQUENCY: FREQUENCY: INTERMITTENT

## 2024-05-14 ASSESSMENT — PAIN DESCRIPTION - LOCATION: LOCATION: CHEST;SHOULDER

## 2024-05-14 ASSESSMENT — PAIN DESCRIPTION - PAIN TYPE: TYPE: CHRONIC PAIN

## 2024-05-14 ASSESSMENT — PAIN DESCRIPTION - ORIENTATION: ORIENTATION: RIGHT;LEFT

## 2024-05-14 ASSESSMENT — PAIN DESCRIPTION - ONSET: ONSET: ON-GOING

## 2024-05-14 ASSESSMENT — PAIN DESCRIPTION - DESCRIPTORS: DESCRIPTORS: ACHING;DISCOMFORT;SORE

## 2024-05-15 ENCOUNTER — APPOINTMENT (OUTPATIENT)
Dept: GENERAL RADIOLOGY | Age: 69
DRG: 208 | End: 2024-05-15
Payer: MEDICARE

## 2024-05-15 LAB
ALBUMIN SERPL-MCNC: 3.2 G/DL (ref 3.5–5.2)
ALP SERPL-CCNC: 158 U/L (ref 35–104)
ALT SERPL-CCNC: 39 U/L (ref 0–32)
ANION GAP SERPL CALCULATED.3IONS-SCNC: 11 MMOL/L (ref 7–16)
AST SERPL-CCNC: 38 U/L (ref 0–31)
BASOPHILS # BLD: 0.01 K/UL (ref 0–0.2)
BASOPHILS NFR BLD: 0 % (ref 0–2)
BILIRUB SERPL-MCNC: 0.3 MG/DL (ref 0–1.2)
BNP SERPL-MCNC: 5654 PG/ML (ref 0–125)
BUN SERPL-MCNC: 31 MG/DL (ref 6–23)
CALCIUM SERPL-MCNC: 9 MG/DL (ref 8.6–10.2)
CHLORIDE SERPL-SCNC: 102 MMOL/L (ref 98–107)
CO2 SERPL-SCNC: 30 MMOL/L (ref 22–29)
CREAT SERPL-MCNC: 1.2 MG/DL (ref 0.5–1)
CREAT UR-MCNC: 165.1 MG/DL (ref 29–226)
EOSINOPHIL # BLD: 0.25 K/UL (ref 0.05–0.5)
EOSINOPHILS RELATIVE PERCENT: 5 % (ref 0–6)
ERYTHROCYTE [DISTWIDTH] IN BLOOD BY AUTOMATED COUNT: 16.4 % (ref 11.5–15)
GFR, ESTIMATED: 50 ML/MIN/1.73M2
GLUCOSE BLD-MCNC: 105 MG/DL (ref 74–99)
GLUCOSE BLD-MCNC: 124 MG/DL (ref 74–99)
GLUCOSE BLD-MCNC: 141 MG/DL (ref 74–99)
GLUCOSE BLD-MCNC: 94 MG/DL (ref 74–99)
GLUCOSE SERPL-MCNC: 80 MG/DL (ref 74–99)
HCT VFR BLD AUTO: 29.1 % (ref 34–48)
HGB BLD-MCNC: 8.8 G/DL (ref 11.5–15.5)
IMM GRANULOCYTES # BLD AUTO: <0.03 K/UL (ref 0–0.58)
IMM GRANULOCYTES NFR BLD: 0 % (ref 0–5)
LYMPHOCYTES NFR BLD: 0.42 K/UL (ref 1.5–4)
LYMPHOCYTES RELATIVE PERCENT: 9 % (ref 20–42)
MCH RBC QN AUTO: 26 PG (ref 26–35)
MCHC RBC AUTO-ENTMCNC: 30.2 G/DL (ref 32–34.5)
MCV RBC AUTO: 86.1 FL (ref 80–99.9)
MONOCYTES NFR BLD: 0.5 K/UL (ref 0.1–0.95)
MONOCYTES NFR BLD: 11 % (ref 2–12)
NEUTROPHILS NFR BLD: 74 % (ref 43–80)
NEUTS SEG NFR BLD: 3.42 K/UL (ref 1.8–7.3)
OSMOLALITY UR: 604 MOSM/KG (ref 300–900)
PLATELET # BLD AUTO: 187 K/UL (ref 130–450)
PMV BLD AUTO: 11.1 FL (ref 7–12)
POTASSIUM SERPL-SCNC: 4.7 MMOL/L (ref 3.5–5)
POTASSIUM, UR: 65.9 MMOL/L
PROT SERPL-MCNC: 5.9 G/DL (ref 6.4–8.3)
RBC # BLD AUTO: 3.38 M/UL (ref 3.5–5.5)
RBC # BLD: ABNORMAL 10*6/UL
SODIUM SERPL-SCNC: 143 MMOL/L (ref 132–146)
SODIUM UR-SCNC: <20 MMOL/L
UUN UR-MCNC: 1135 MG/DL (ref 800–1666)
WBC OTHER # BLD: 4.6 K/UL (ref 4.5–11.5)

## 2024-05-15 PROCEDURE — 99232 SBSQ HOSP IP/OBS MODERATE 35: CPT | Performed by: FAMILY MEDICINE

## 2024-05-15 PROCEDURE — 6360000002 HC RX W HCPCS

## 2024-05-15 PROCEDURE — 71045 X-RAY EXAM CHEST 1 VIEW: CPT

## 2024-05-15 PROCEDURE — 84133 ASSAY OF URINE POTASSIUM: CPT

## 2024-05-15 PROCEDURE — 6370000000 HC RX 637 (ALT 250 FOR IP)

## 2024-05-15 PROCEDURE — 80053 COMPREHEN METABOLIC PANEL: CPT

## 2024-05-15 PROCEDURE — 83880 ASSAY OF NATRIURETIC PEPTIDE: CPT

## 2024-05-15 PROCEDURE — 82570 ASSAY OF URINE CREATININE: CPT

## 2024-05-15 PROCEDURE — 94660 CPAP INITIATION&MGMT: CPT

## 2024-05-15 PROCEDURE — 82962 GLUCOSE BLOOD TEST: CPT

## 2024-05-15 PROCEDURE — 2580000003 HC RX 258

## 2024-05-15 PROCEDURE — 85025 COMPLETE CBC W/AUTO DIFF WBC: CPT

## 2024-05-15 PROCEDURE — 84540 ASSAY OF URINE/UREA-N: CPT

## 2024-05-15 PROCEDURE — 83935 ASSAY OF URINE OSMOLALITY: CPT

## 2024-05-15 PROCEDURE — 2700000000 HC OXYGEN THERAPY PER DAY

## 2024-05-15 PROCEDURE — 36415 COLL VENOUS BLD VENIPUNCTURE: CPT

## 2024-05-15 PROCEDURE — 84300 ASSAY OF URINE SODIUM: CPT

## 2024-05-15 PROCEDURE — 2140000000 HC CCU INTERMEDIATE R&B

## 2024-05-15 PROCEDURE — 94640 AIRWAY INHALATION TREATMENT: CPT

## 2024-05-15 PROCEDURE — 6370000000 HC RX 637 (ALT 250 FOR IP): Performed by: INTERNAL MEDICINE

## 2024-05-15 PROCEDURE — P9047 ALBUMIN (HUMAN), 25%, 50ML: HCPCS

## 2024-05-15 RX ORDER — FUROSEMIDE 40 MG/1
40 TABLET ORAL DAILY
Status: DISCONTINUED | OUTPATIENT
Start: 2024-05-15 | End: 2024-05-19

## 2024-05-15 RX ORDER — ALBUMIN (HUMAN) 12.5 G/50ML
12.5 SOLUTION INTRAVENOUS 2 TIMES DAILY
Status: COMPLETED | OUTPATIENT
Start: 2024-05-15 | End: 2024-05-16

## 2024-05-15 RX ADMIN — ACETAMINOPHEN 650 MG: 325 TABLET ORAL at 06:06

## 2024-05-15 RX ADMIN — DESMOPRESSIN ACETATE 200 MCG: 0.1 TABLET ORAL at 09:48

## 2024-05-15 RX ADMIN — IPRATROPIUM BROMIDE AND ALBUTEROL SULFATE 1 DOSE: .5; 2.5 SOLUTION RESPIRATORY (INHALATION) at 06:11

## 2024-05-15 RX ADMIN — IPRATROPIUM BROMIDE AND ALBUTEROL SULFATE 1 DOSE: .5; 2.5 SOLUTION RESPIRATORY (INHALATION) at 11:56

## 2024-05-15 RX ADMIN — LEVETIRACETAM 500 MG: 500 TABLET, FILM COATED ORAL at 20:16

## 2024-05-15 RX ADMIN — CYCLOBENZAPRINE 10 MG: 10 TABLET, FILM COATED ORAL at 06:07

## 2024-05-15 RX ADMIN — IPRATROPIUM BROMIDE AND ALBUTEROL SULFATE 1 DOSE: .5; 2.5 SOLUTION RESPIRATORY (INHALATION) at 15:07

## 2024-05-15 RX ADMIN — SILDENAFIL 10 MG: 20 TABLET, FILM COATED ORAL at 20:16

## 2024-05-15 RX ADMIN — ARFORMOTEROL TARTRATE 15 MCG: 15 SOLUTION RESPIRATORY (INHALATION) at 21:43

## 2024-05-15 RX ADMIN — DESMOPRESSIN ACETATE 200 MCG: 0.1 TABLET ORAL at 20:15

## 2024-05-15 RX ADMIN — FUROSEMIDE 40 MG: 40 TABLET ORAL at 13:02

## 2024-05-15 RX ADMIN — SILDENAFIL 10 MG: 20 TABLET, FILM COATED ORAL at 09:49

## 2024-05-15 RX ADMIN — HYDROCORTISONE 5 MG: 5 TABLET ORAL at 20:16

## 2024-05-15 RX ADMIN — DIGOXIN 62.5 MCG: 0.12 TABLET ORAL at 09:47

## 2024-05-15 RX ADMIN — BUDESONIDE INHALATION 500 MCG: 0.5 SUSPENSION RESPIRATORY (INHALATION) at 21:43

## 2024-05-15 RX ADMIN — LEVETIRACETAM 500 MG: 500 TABLET, FILM COATED ORAL at 09:47

## 2024-05-15 RX ADMIN — AMLODIPINE BESYLATE 5 MG: 5 TABLET ORAL at 09:48

## 2024-05-15 RX ADMIN — DICLOFENAC SODIUM 2 G: 10 GEL TOPICAL at 20:18

## 2024-05-15 RX ADMIN — OMEGA-3-ACID ETHYL ESTERS 2 G: 1 CAPSULE, LIQUID FILLED ORAL at 09:50

## 2024-05-15 RX ADMIN — SILDENAFIL 10 MG: 20 TABLET, FILM COATED ORAL at 13:02

## 2024-05-15 RX ADMIN — HYDROCORTISONE 10 MG: 5 TABLET ORAL at 09:48

## 2024-05-15 RX ADMIN — ACETAMINOPHEN 650 MG: 325 TABLET ORAL at 13:02

## 2024-05-15 RX ADMIN — IPRATROPIUM BROMIDE AND ALBUTEROL SULFATE 1 DOSE: .5; 2.5 SOLUTION RESPIRATORY (INHALATION) at 21:43

## 2024-05-15 RX ADMIN — DULOXETINE HYDROCHLORIDE 60 MG: 60 CAPSULE, DELAYED RELEASE ORAL at 09:47

## 2024-05-15 RX ADMIN — LEVOTHYROXINE SODIUM 50 MCG: 0.05 TABLET ORAL at 09:48

## 2024-05-15 RX ADMIN — ALBUMIN (HUMAN) 12.5 G: 0.25 INJECTION, SOLUTION INTRAVENOUS at 20:26

## 2024-05-15 RX ADMIN — Medication 1000 UNITS: at 09:47

## 2024-05-15 RX ADMIN — METOPROLOL SUCCINATE 50 MG: 50 TABLET, EXTENDED RELEASE ORAL at 20:16

## 2024-05-15 RX ADMIN — ACETAMINOPHEN 650 MG: 325 TABLET ORAL at 20:23

## 2024-05-15 RX ADMIN — DICLOFENAC SODIUM 2 G: 10 GEL TOPICAL at 09:49

## 2024-05-15 RX ADMIN — BUDESONIDE INHALATION 500 MCG: 0.5 SUSPENSION RESPIRATORY (INHALATION) at 06:11

## 2024-05-15 RX ADMIN — SODIUM CHLORIDE, PRESERVATIVE FREE 10 ML: 5 INJECTION INTRAVENOUS at 09:50

## 2024-05-15 RX ADMIN — SODIUM CHLORIDE, PRESERVATIVE FREE 10 ML: 5 INJECTION INTRAVENOUS at 20:17

## 2024-05-15 RX ADMIN — FERROUS SULFATE TAB 325 MG (65 MG ELEMENTAL FE) 325 MG: 325 (65 FE) TAB at 09:47

## 2024-05-15 RX ADMIN — MULTIVITAMIN TABLET 1 TABLET: TABLET at 09:49

## 2024-05-15 RX ADMIN — APIXABAN 5 MG: 5 TABLET, FILM COATED ORAL at 09:47

## 2024-05-15 RX ADMIN — ALBUMIN (HUMAN) 12.5 G: 0.25 INJECTION, SOLUTION INTRAVENOUS at 13:36

## 2024-05-15 RX ADMIN — OMEGA-3-ACID ETHYL ESTERS 2 G: 1 CAPSULE, LIQUID FILLED ORAL at 20:16

## 2024-05-15 RX ADMIN — APIXABAN 5 MG: 5 TABLET, FILM COATED ORAL at 20:16

## 2024-05-15 RX ADMIN — ARFORMOTEROL TARTRATE 15 MCG: 15 SOLUTION RESPIRATORY (INHALATION) at 06:11

## 2024-05-15 RX ADMIN — METOPROLOL SUCCINATE 50 MG: 50 TABLET, EXTENDED RELEASE ORAL at 09:47

## 2024-05-15 RX ADMIN — FERROUS SULFATE TAB 325 MG (65 MG ELEMENTAL FE) 325 MG: 325 (65 FE) TAB at 20:16

## 2024-05-15 ASSESSMENT — PAIN DESCRIPTION - ORIENTATION: ORIENTATION: RIGHT;LEFT

## 2024-05-15 ASSESSMENT — PAIN DESCRIPTION - ONSET: ONSET: ON-GOING

## 2024-05-15 ASSESSMENT — PAIN DESCRIPTION - PAIN TYPE: TYPE: CHRONIC PAIN

## 2024-05-15 ASSESSMENT — PAIN SCALES - GENERAL
PAINLEVEL_OUTOF10: 4
PAINLEVEL_OUTOF10: 7

## 2024-05-15 ASSESSMENT — PAIN DESCRIPTION - FREQUENCY: FREQUENCY: INTERMITTENT

## 2024-05-15 ASSESSMENT — PAIN - FUNCTIONAL ASSESSMENT: PAIN_FUNCTIONAL_ASSESSMENT: PREVENTS OR INTERFERES SOME ACTIVE ACTIVITIES AND ADLS

## 2024-05-15 ASSESSMENT — PAIN DESCRIPTION - DESCRIPTORS: DESCRIPTORS: ACHING;DISCOMFORT;SORE

## 2024-05-15 ASSESSMENT — PAIN DESCRIPTION - LOCATION: LOCATION: SHOULDER

## 2024-05-16 ENCOUNTER — APPOINTMENT (OUTPATIENT)
Dept: GENERAL RADIOLOGY | Age: 69
DRG: 208 | End: 2024-05-16
Payer: MEDICARE

## 2024-05-16 LAB
ALBUMIN SERPL-MCNC: 3.8 G/DL (ref 3.5–5.2)
ALP SERPL-CCNC: 174 U/L (ref 35–104)
ALT SERPL-CCNC: 36 U/L (ref 0–32)
ANION GAP SERPL CALCULATED.3IONS-SCNC: 12 MMOL/L (ref 7–16)
AST SERPL-CCNC: 40 U/L (ref 0–31)
BASOPHILS # BLD: 0.04 K/UL (ref 0–0.2)
BASOPHILS NFR BLD: 1 % (ref 0–2)
BILIRUB SERPL-MCNC: 0.5 MG/DL (ref 0–1.2)
BNP SERPL-MCNC: 5524 PG/ML (ref 0–125)
BUN SERPL-MCNC: 26 MG/DL (ref 6–23)
CALCIUM SERPL-MCNC: 9.2 MG/DL (ref 8.6–10.2)
CHLORIDE SERPL-SCNC: 102 MMOL/L (ref 98–107)
CO2 SERPL-SCNC: 30 MMOL/L (ref 22–29)
CREAT SERPL-MCNC: 1.1 MG/DL (ref 0.5–1)
EOSINOPHIL # BLD: 0.19 K/UL (ref 0.05–0.5)
EOSINOPHILS RELATIVE PERCENT: 4 % (ref 0–6)
ERYTHROCYTE [DISTWIDTH] IN BLOOD BY AUTOMATED COUNT: 16.3 % (ref 11.5–15)
FERRITIN SERPL-MCNC: 350 NG/ML
FOLATE SERPL-MCNC: >20 NG/ML (ref 4.8–24.2)
GFR, ESTIMATED: 54 ML/MIN/1.73M2
GLUCOSE BLD-MCNC: 103 MG/DL (ref 74–99)
GLUCOSE BLD-MCNC: 107 MG/DL (ref 74–99)
GLUCOSE BLD-MCNC: 112 MG/DL (ref 74–99)
GLUCOSE BLD-MCNC: 92 MG/DL (ref 74–99)
GLUCOSE SERPL-MCNC: 83 MG/DL (ref 74–99)
HCT VFR BLD AUTO: 28.4 % (ref 34–48)
HGB BLD-MCNC: 8.4 G/DL (ref 11.5–15.5)
IRON SATN MFR SERPL: 17 % (ref 15–50)
IRON SERPL-MCNC: 30 UG/DL (ref 37–145)
LYMPHOCYTES NFR BLD: 0.41 K/UL (ref 1.5–4)
LYMPHOCYTES RELATIVE PERCENT: 10 % (ref 20–42)
MCH RBC QN AUTO: 25.4 PG (ref 26–35)
MCHC RBC AUTO-ENTMCNC: 29.6 G/DL (ref 32–34.5)
MCV RBC AUTO: 85.8 FL (ref 80–99.9)
MONOCYTES NFR BLD: 0.34 K/UL (ref 0.1–0.95)
MONOCYTES NFR BLD: 8 % (ref 2–12)
NEUTROPHILS NFR BLD: 77 % (ref 43–80)
NEUTS SEG NFR BLD: 3.33 K/UL (ref 1.8–7.3)
PLATELET # BLD AUTO: 192 K/UL (ref 130–450)
PMV BLD AUTO: 10.4 FL (ref 7–12)
POTASSIUM SERPL-SCNC: 4.9 MMOL/L (ref 3.5–5)
PROT SERPL-MCNC: 6.3 G/DL (ref 6.4–8.3)
RBC # BLD AUTO: 3.31 M/UL (ref 3.5–5.5)
RBC # BLD: ABNORMAL 10*6/UL
SODIUM SERPL-SCNC: 144 MMOL/L (ref 132–146)
TIBC SERPL-MCNC: 174 UG/DL (ref 250–450)
VIT B12 SERPL-MCNC: 1069 PG/ML (ref 211–946)
WBC OTHER # BLD: 4.3 K/UL (ref 4.5–11.5)

## 2024-05-16 PROCEDURE — 99232 SBSQ HOSP IP/OBS MODERATE 35: CPT | Performed by: FAMILY MEDICINE

## 2024-05-16 PROCEDURE — 6370000000 HC RX 637 (ALT 250 FOR IP)

## 2024-05-16 PROCEDURE — 6360000002 HC RX W HCPCS

## 2024-05-16 PROCEDURE — 6370000000 HC RX 637 (ALT 250 FOR IP): Performed by: INTERNAL MEDICINE

## 2024-05-16 PROCEDURE — 82607 VITAMIN B-12: CPT

## 2024-05-16 PROCEDURE — 82728 ASSAY OF FERRITIN: CPT

## 2024-05-16 PROCEDURE — 2700000000 HC OXYGEN THERAPY PER DAY

## 2024-05-16 PROCEDURE — P9047 ALBUMIN (HUMAN), 25%, 50ML: HCPCS

## 2024-05-16 PROCEDURE — 83550 IRON BINDING TEST: CPT

## 2024-05-16 PROCEDURE — 85025 COMPLETE CBC W/AUTO DIFF WBC: CPT

## 2024-05-16 PROCEDURE — 2580000003 HC RX 258

## 2024-05-16 PROCEDURE — 80053 COMPREHEN METABOLIC PANEL: CPT

## 2024-05-16 PROCEDURE — 97530 THERAPEUTIC ACTIVITIES: CPT

## 2024-05-16 PROCEDURE — 83880 ASSAY OF NATRIURETIC PEPTIDE: CPT

## 2024-05-16 PROCEDURE — 82962 GLUCOSE BLOOD TEST: CPT

## 2024-05-16 PROCEDURE — 2140000000 HC CCU INTERMEDIATE R&B

## 2024-05-16 PROCEDURE — 94640 AIRWAY INHALATION TREATMENT: CPT

## 2024-05-16 PROCEDURE — 83540 ASSAY OF IRON: CPT

## 2024-05-16 PROCEDURE — 71045 X-RAY EXAM CHEST 1 VIEW: CPT

## 2024-05-16 PROCEDURE — 97161 PT EVAL LOW COMPLEX 20 MIN: CPT

## 2024-05-16 PROCEDURE — 36415 COLL VENOUS BLD VENIPUNCTURE: CPT

## 2024-05-16 PROCEDURE — 97165 OT EVAL LOW COMPLEX 30 MIN: CPT

## 2024-05-16 PROCEDURE — 94660 CPAP INITIATION&MGMT: CPT

## 2024-05-16 PROCEDURE — 82746 ASSAY OF FOLIC ACID SERUM: CPT

## 2024-05-16 RX ADMIN — IPRATROPIUM BROMIDE AND ALBUTEROL SULFATE 1 DOSE: .5; 2.5 SOLUTION RESPIRATORY (INHALATION) at 20:37

## 2024-05-16 RX ADMIN — OMEGA-3-ACID ETHYL ESTERS 2 G: 1 CAPSULE, LIQUID FILLED ORAL at 08:51

## 2024-05-16 RX ADMIN — Medication 1000 UNITS: at 08:51

## 2024-05-16 RX ADMIN — SODIUM CHLORIDE, PRESERVATIVE FREE 10 ML: 5 INJECTION INTRAVENOUS at 21:03

## 2024-05-16 RX ADMIN — OMEGA-3-ACID ETHYL ESTERS 2 G: 1 CAPSULE, LIQUID FILLED ORAL at 21:02

## 2024-05-16 RX ADMIN — DICLOFENAC SODIUM 2 G: 10 GEL TOPICAL at 21:11

## 2024-05-16 RX ADMIN — DESMOPRESSIN ACETATE 200 MCG: 0.1 TABLET ORAL at 08:50

## 2024-05-16 RX ADMIN — LEVETIRACETAM 500 MG: 500 TABLET, FILM COATED ORAL at 21:01

## 2024-05-16 RX ADMIN — DIGOXIN 62.5 MCG: 0.12 TABLET ORAL at 08:51

## 2024-05-16 RX ADMIN — DICLOFENAC SODIUM 2 G: 10 GEL TOPICAL at 08:57

## 2024-05-16 RX ADMIN — ACETAMINOPHEN 650 MG: 325 TABLET ORAL at 21:01

## 2024-05-16 RX ADMIN — ALBUMIN (HUMAN) 12.5 G: 0.25 INJECTION, SOLUTION INTRAVENOUS at 12:03

## 2024-05-16 RX ADMIN — LEVETIRACETAM 500 MG: 500 TABLET, FILM COATED ORAL at 08:55

## 2024-05-16 RX ADMIN — AMLODIPINE BESYLATE 5 MG: 5 TABLET ORAL at 08:55

## 2024-05-16 RX ADMIN — IPRATROPIUM BROMIDE AND ALBUTEROL SULFATE 1 DOSE: .5; 2.5 SOLUTION RESPIRATORY (INHALATION) at 09:16

## 2024-05-16 RX ADMIN — LEVOTHYROXINE SODIUM 50 MCG: 0.05 TABLET ORAL at 08:51

## 2024-05-16 RX ADMIN — ACETAMINOPHEN 650 MG: 325 TABLET ORAL at 15:37

## 2024-05-16 RX ADMIN — SODIUM CHLORIDE, PRESERVATIVE FREE 10 ML: 5 INJECTION INTRAVENOUS at 08:53

## 2024-05-16 RX ADMIN — DESMOPRESSIN ACETATE 200 MCG: 0.1 TABLET ORAL at 21:01

## 2024-05-16 RX ADMIN — IPRATROPIUM BROMIDE AND ALBUTEROL SULFATE 1 DOSE: .5; 2.5 SOLUTION RESPIRATORY (INHALATION) at 16:54

## 2024-05-16 RX ADMIN — FUROSEMIDE 40 MG: 40 TABLET ORAL at 08:50

## 2024-05-16 RX ADMIN — SILDENAFIL 10 MG: 20 TABLET, FILM COATED ORAL at 08:51

## 2024-05-16 RX ADMIN — APIXABAN 5 MG: 5 TABLET, FILM COATED ORAL at 08:50

## 2024-05-16 RX ADMIN — IPRATROPIUM BROMIDE AND ALBUTEROL SULFATE 1 DOSE: .5; 2.5 SOLUTION RESPIRATORY (INHALATION) at 14:37

## 2024-05-16 RX ADMIN — APIXABAN 5 MG: 5 TABLET, FILM COATED ORAL at 21:00

## 2024-05-16 RX ADMIN — HYDROCORTISONE 10 MG: 5 TABLET ORAL at 08:51

## 2024-05-16 RX ADMIN — MULTIVITAMIN TABLET 1 TABLET: TABLET at 08:50

## 2024-05-16 RX ADMIN — BUDESONIDE INHALATION 500 MCG: 0.5 SUSPENSION RESPIRATORY (INHALATION) at 09:18

## 2024-05-16 RX ADMIN — ACETAMINOPHEN 650 MG: 325 TABLET ORAL at 06:11

## 2024-05-16 RX ADMIN — DULOXETINE HYDROCHLORIDE 60 MG: 60 CAPSULE, DELAYED RELEASE ORAL at 08:50

## 2024-05-16 RX ADMIN — BUDESONIDE INHALATION 500 MCG: 0.5 SUSPENSION RESPIRATORY (INHALATION) at 20:37

## 2024-05-16 RX ADMIN — FERROUS SULFATE TAB 325 MG (65 MG ELEMENTAL FE) 325 MG: 325 (65 FE) TAB at 08:50

## 2024-05-16 RX ADMIN — ARFORMOTEROL TARTRATE 15 MCG: 15 SOLUTION RESPIRATORY (INHALATION) at 20:37

## 2024-05-16 RX ADMIN — SILDENAFIL 10 MG: 20 TABLET, FILM COATED ORAL at 21:02

## 2024-05-16 RX ADMIN — METOPROLOL SUCCINATE 50 MG: 50 TABLET, EXTENDED RELEASE ORAL at 21:02

## 2024-05-16 RX ADMIN — ALBUMIN (HUMAN) 12.5 G: 0.25 INJECTION, SOLUTION INTRAVENOUS at 21:15

## 2024-05-16 RX ADMIN — SILDENAFIL 10 MG: 20 TABLET, FILM COATED ORAL at 15:36

## 2024-05-16 RX ADMIN — FERROUS SULFATE TAB 325 MG (65 MG ELEMENTAL FE) 325 MG: 325 (65 FE) TAB at 21:01

## 2024-05-16 RX ADMIN — CYCLOBENZAPRINE 10 MG: 10 TABLET, FILM COATED ORAL at 06:15

## 2024-05-16 RX ADMIN — METOPROLOL SUCCINATE 50 MG: 50 TABLET, EXTENDED RELEASE ORAL at 08:50

## 2024-05-16 RX ADMIN — HYDROCORTISONE 5 MG: 5 TABLET ORAL at 21:01

## 2024-05-16 RX ADMIN — ARFORMOTEROL TARTRATE 15 MCG: 15 SOLUTION RESPIRATORY (INHALATION) at 09:17

## 2024-05-16 ASSESSMENT — PAIN DESCRIPTION - ORIENTATION
ORIENTATION: RIGHT;LEFT
ORIENTATION: RIGHT

## 2024-05-16 ASSESSMENT — PAIN DESCRIPTION - DESCRIPTORS
DESCRIPTORS: ACHING;TENDER;SORE;DISCOMFORT
DESCRIPTORS: ACHING;TENDER;SORE

## 2024-05-16 ASSESSMENT — PAIN DESCRIPTION - PAIN TYPE: TYPE: CHRONIC PAIN

## 2024-05-16 ASSESSMENT — PAIN DESCRIPTION - LOCATION
LOCATION: SHOULDER
LOCATION: SHOULDER

## 2024-05-16 ASSESSMENT — PAIN SCALES - GENERAL
PAINLEVEL_OUTOF10: 6
PAINLEVEL_OUTOF10: 8

## 2024-05-16 ASSESSMENT — PAIN DESCRIPTION - FREQUENCY: FREQUENCY: CONTINUOUS

## 2024-05-16 ASSESSMENT — PAIN DESCRIPTION - ONSET: ONSET: ON-GOING

## 2024-05-17 ENCOUNTER — APPOINTMENT (OUTPATIENT)
Dept: GENERAL RADIOLOGY | Age: 69
DRG: 208 | End: 2024-05-17
Payer: MEDICARE

## 2024-05-17 PROBLEM — E43 SEVERE PROTEIN-CALORIE MALNUTRITION (HCC): Chronic | Status: ACTIVE | Noted: 2024-05-17

## 2024-05-17 LAB
AADO2: 164.2 MMHG
ANION GAP SERPL CALCULATED.3IONS-SCNC: 15 MMOL/L (ref 7–16)
B.E.: 6.6 MMOL/L (ref -3–3)
BASOPHILS # BLD: 0.14 K/UL (ref 0–0.2)
BASOPHILS NFR BLD: 2 % (ref 0–2)
BNP SERPL-MCNC: 7263 PG/ML (ref 0–125)
BUN SERPL-MCNC: 21 MG/DL (ref 6–23)
CALCIUM SERPL-MCNC: 10 MG/DL (ref 8.6–10.2)
CHLORIDE SERPL-SCNC: 96 MMOL/L (ref 98–107)
CO2 SERPL-SCNC: 27 MMOL/L (ref 22–29)
COHB: 0.4 % (ref 0–1.5)
CREAT SERPL-MCNC: 0.8 MG/DL (ref 0.5–1)
CRITICAL: ABNORMAL
DATE ANALYZED: ABNORMAL
DATE OF COLLECTION: ABNORMAL
EOSINOPHIL # BLD: 0.14 K/UL (ref 0.05–0.5)
EOSINOPHILS RELATIVE PERCENT: 2 % (ref 0–6)
ERYTHROCYTE [DISTWIDTH] IN BLOOD BY AUTOMATED COUNT: 16.5 % (ref 11.5–15)
FIO2: 40 %
GFR, ESTIMATED: 78 ML/MIN/1.73M2
GLUCOSE BLD-MCNC: 105 MG/DL (ref 74–99)
GLUCOSE BLD-MCNC: 110 MG/DL (ref 74–99)
GLUCOSE BLD-MCNC: 121 MG/DL (ref 74–99)
GLUCOSE BLD-MCNC: 65 MG/DL (ref 74–99)
GLUCOSE SERPL-MCNC: 96 MG/DL (ref 74–99)
HCO3: 30.3 MMOL/L (ref 22–26)
HCT VFR BLD AUTO: 31.6 % (ref 34–48)
HGB BLD-MCNC: 9.4 G/DL (ref 11.5–15.5)
HHB: 7.1 % (ref 0–5)
LAB: ABNORMAL
LYMPHOCYTES NFR BLD: 0.14 K/UL (ref 1.5–4)
LYMPHOCYTES RELATIVE PERCENT: 2 % (ref 20–42)
Lab: 1635
MCH RBC QN AUTO: 25.3 PG (ref 26–35)
MCHC RBC AUTO-ENTMCNC: 29.7 G/DL (ref 32–34.5)
MCV RBC AUTO: 84.9 FL (ref 80–99.9)
METHB: 0.2 % (ref 0–1.5)
MODE: ABNORMAL
MONOCYTES NFR BLD: 0.83 K/UL (ref 0.1–0.95)
MONOCYTES NFR BLD: 11 % (ref 2–12)
NEUTROPHILS NFR BLD: 84 % (ref 43–80)
NEUTS SEG NFR BLD: 6.65 K/UL (ref 1.8–7.3)
NUCLEATED RED BLOOD CELLS: 1 PER 100 WBC
O2 SATURATION: 92.9 % (ref 92–98.5)
O2HB: 92.3 % (ref 94–97)
OPERATOR ID: ABNORMAL
PATIENT TEMP: 37 C
PCO2: 39.9 MMHG (ref 35–45)
PEEP/CPAP: 6 CMH2O
PFO2: 1.63 MMHG/%
PH BLOOD GAS: 7.5 (ref 7.35–7.45)
PLATELET # BLD AUTO: 280 K/UL (ref 130–450)
PMV BLD AUTO: 10.1 FL (ref 7–12)
PO2: 65.1 MMHG (ref 75–100)
POTASSIUM SERPL-SCNC: 4.1 MMOL/L (ref 3.5–5)
PS: 14 CMH20
RBC # BLD AUTO: 3.72 M/UL (ref 3.5–5.5)
RBC # BLD: ABNORMAL 10*6/UL
RI(T): 2.52
SODIUM SERPL-SCNC: 138 MMOL/L (ref 132–146)
SOURCE, BLOOD GAS: ABNORMAL
THB: 10.2 G/DL (ref 11.5–16.5)
TIME ANALYZED: 1640
WBC OTHER # BLD: 7.9 K/UL (ref 4.5–11.5)

## 2024-05-17 PROCEDURE — 2700000000 HC OXYGEN THERAPY PER DAY

## 2024-05-17 PROCEDURE — 6370000000 HC RX 637 (ALT 250 FOR IP)

## 2024-05-17 PROCEDURE — 2580000003 HC RX 258

## 2024-05-17 PROCEDURE — 2140000000 HC CCU INTERMEDIATE R&B

## 2024-05-17 PROCEDURE — 82805 BLOOD GASES W/O2 SATURATION: CPT

## 2024-05-17 PROCEDURE — 82962 GLUCOSE BLOOD TEST: CPT

## 2024-05-17 PROCEDURE — 80048 BASIC METABOLIC PNL TOTAL CA: CPT

## 2024-05-17 PROCEDURE — 6370000000 HC RX 637 (ALT 250 FOR IP): Performed by: INTERNAL MEDICINE

## 2024-05-17 PROCEDURE — 94640 AIRWAY INHALATION TREATMENT: CPT

## 2024-05-17 PROCEDURE — 94660 CPAP INITIATION&MGMT: CPT

## 2024-05-17 PROCEDURE — 85025 COMPLETE CBC W/AUTO DIFF WBC: CPT

## 2024-05-17 PROCEDURE — 6360000002 HC RX W HCPCS

## 2024-05-17 PROCEDURE — 36415 COLL VENOUS BLD VENIPUNCTURE: CPT

## 2024-05-17 PROCEDURE — 83880 ASSAY OF NATRIURETIC PEPTIDE: CPT

## 2024-05-17 PROCEDURE — 99232 SBSQ HOSP IP/OBS MODERATE 35: CPT | Performed by: FAMILY MEDICINE

## 2024-05-17 PROCEDURE — 36600 WITHDRAWAL OF ARTERIAL BLOOD: CPT

## 2024-05-17 PROCEDURE — 71045 X-RAY EXAM CHEST 1 VIEW: CPT

## 2024-05-17 RX ADMIN — DIGOXIN 62.5 MCG: 0.12 TABLET ORAL at 08:23

## 2024-05-17 RX ADMIN — HYDROCORTISONE 10 MG: 5 TABLET ORAL at 08:24

## 2024-05-17 RX ADMIN — SILDENAFIL 10 MG: 20 TABLET, FILM COATED ORAL at 08:22

## 2024-05-17 RX ADMIN — IPRATROPIUM BROMIDE AND ALBUTEROL SULFATE 1 DOSE: .5; 2.5 SOLUTION RESPIRATORY (INHALATION) at 20:09

## 2024-05-17 RX ADMIN — METOPROLOL SUCCINATE 50 MG: 50 TABLET, EXTENDED RELEASE ORAL at 08:24

## 2024-05-17 RX ADMIN — LEVOTHYROXINE SODIUM 50 MCG: 0.05 TABLET ORAL at 08:23

## 2024-05-17 RX ADMIN — APIXABAN 5 MG: 5 TABLET, FILM COATED ORAL at 08:23

## 2024-05-17 RX ADMIN — DESMOPRESSIN ACETATE 200 MCG: 0.1 TABLET ORAL at 21:50

## 2024-05-17 RX ADMIN — LEVETIRACETAM 500 MG: 500 TABLET, FILM COATED ORAL at 21:49

## 2024-05-17 RX ADMIN — FUROSEMIDE 40 MG: 40 TABLET ORAL at 11:00

## 2024-05-17 RX ADMIN — DULOXETINE HYDROCHLORIDE 60 MG: 60 CAPSULE, DELAYED RELEASE ORAL at 08:22

## 2024-05-17 RX ADMIN — APIXABAN 5 MG: 5 TABLET, FILM COATED ORAL at 21:49

## 2024-05-17 RX ADMIN — LEVETIRACETAM 500 MG: 500 TABLET, FILM COATED ORAL at 08:23

## 2024-05-17 RX ADMIN — AMLODIPINE BESYLATE 5 MG: 5 TABLET ORAL at 08:22

## 2024-05-17 RX ADMIN — OMEGA-3-ACID ETHYL ESTERS 2 G: 1 CAPSULE, LIQUID FILLED ORAL at 08:23

## 2024-05-17 RX ADMIN — DESMOPRESSIN ACETATE 200 MCG: 0.1 TABLET ORAL at 08:24

## 2024-05-17 RX ADMIN — ACETAMINOPHEN 650 MG: 325 TABLET ORAL at 13:32

## 2024-05-17 RX ADMIN — IPRATROPIUM BROMIDE AND ALBUTEROL SULFATE 1 DOSE: .5; 2.5 SOLUTION RESPIRATORY (INHALATION) at 16:18

## 2024-05-17 RX ADMIN — SODIUM CHLORIDE, PRESERVATIVE FREE 10 ML: 5 INJECTION INTRAVENOUS at 08:24

## 2024-05-17 RX ADMIN — FERROUS SULFATE TAB 325 MG (65 MG ELEMENTAL FE) 325 MG: 325 (65 FE) TAB at 08:33

## 2024-05-17 RX ADMIN — OMEGA-3-ACID ETHYL ESTERS 2 G: 1 CAPSULE, LIQUID FILLED ORAL at 21:50

## 2024-05-17 RX ADMIN — SILDENAFIL 10 MG: 20 TABLET, FILM COATED ORAL at 13:32

## 2024-05-17 RX ADMIN — METOPROLOL SUCCINATE 50 MG: 50 TABLET, EXTENDED RELEASE ORAL at 21:55

## 2024-05-17 RX ADMIN — DICLOFENAC SODIUM 2 G: 10 GEL TOPICAL at 08:26

## 2024-05-17 RX ADMIN — ACETAMINOPHEN 650 MG: 325 TABLET ORAL at 21:58

## 2024-05-17 RX ADMIN — ARFORMOTEROL TARTRATE 15 MCG: 15 SOLUTION RESPIRATORY (INHALATION) at 20:09

## 2024-05-17 RX ADMIN — HYDROCORTISONE 5 MG: 5 TABLET ORAL at 21:50

## 2024-05-17 RX ADMIN — BUDESONIDE INHALATION 500 MCG: 0.5 SUSPENSION RESPIRATORY (INHALATION) at 20:09

## 2024-05-17 RX ADMIN — FERROUS SULFATE TAB 325 MG (65 MG ELEMENTAL FE) 325 MG: 325 (65 FE) TAB at 21:50

## 2024-05-17 RX ADMIN — MULTIVITAMIN TABLET 1 TABLET: TABLET at 08:24

## 2024-05-17 RX ADMIN — DICLOFENAC SODIUM 2 G: 10 GEL TOPICAL at 21:54

## 2024-05-17 RX ADMIN — SODIUM CHLORIDE, PRESERVATIVE FREE 10 ML: 5 INJECTION INTRAVENOUS at 21:49

## 2024-05-17 RX ADMIN — Medication 1000 UNITS: at 08:23

## 2024-05-17 ASSESSMENT — PAIN - FUNCTIONAL ASSESSMENT
PAIN_FUNCTIONAL_ASSESSMENT: PREVENTS OR INTERFERES SOME ACTIVE ACTIVITIES AND ADLS
PAIN_FUNCTIONAL_ASSESSMENT: PREVENTS OR INTERFERES SOME ACTIVE ACTIVITIES AND ADLS
PAIN_FUNCTIONAL_ASSESSMENT: ACTIVITIES ARE NOT PREVENTED
PAIN_FUNCTIONAL_ASSESSMENT: PREVENTS OR INTERFERES SOME ACTIVE ACTIVITIES AND ADLS

## 2024-05-17 ASSESSMENT — PAIN SCALES - GENERAL
PAINLEVEL_OUTOF10: 8
PAINLEVEL_OUTOF10: 0
PAINLEVEL_OUTOF10: 4
PAINLEVEL_OUTOF10: 5
PAINLEVEL_OUTOF10: 0
PAINLEVEL_OUTOF10: 5
PAINLEVEL_OUTOF10: 0

## 2024-05-17 ASSESSMENT — PAIN SCALES - WONG BAKER
WONGBAKER_NUMERICALRESPONSE: NO HURT

## 2024-05-17 ASSESSMENT — PAIN DESCRIPTION - ORIENTATION
ORIENTATION: RIGHT;LEFT
ORIENTATION: RIGHT
ORIENTATION: RIGHT;LEFT
ORIENTATION: LEFT;RIGHT

## 2024-05-17 ASSESSMENT — PAIN DESCRIPTION - LOCATION
LOCATION: ARM
LOCATION: NECK;SHOULDER
LOCATION: SHOULDER;NECK
LOCATION: SHOULDER;NECK

## 2024-05-17 ASSESSMENT — PAIN DESCRIPTION - DESCRIPTORS
DESCRIPTORS: ACHING;DISCOMFORT;SHOOTING
DESCRIPTORS: ACHING;DISCOMFORT;SORE
DESCRIPTORS: ACHING;DISCOMFORT;SORE
DESCRIPTORS: ACHING;DISCOMFORT

## 2024-05-17 ASSESSMENT — PAIN DESCRIPTION - ONSET
ONSET: ON-GOING

## 2024-05-17 ASSESSMENT — PAIN DESCRIPTION - PAIN TYPE
TYPE: CHRONIC PAIN

## 2024-05-17 ASSESSMENT — PAIN DESCRIPTION - FREQUENCY
FREQUENCY: CONTINUOUS

## 2024-05-18 ENCOUNTER — APPOINTMENT (OUTPATIENT)
Dept: GENERAL RADIOLOGY | Age: 69
DRG: 208 | End: 2024-05-18
Payer: MEDICARE

## 2024-05-18 LAB
ALBUMIN SERPL-MCNC: 3.7 G/DL (ref 3.5–5.2)
ALP SERPL-CCNC: 160 U/L (ref 35–104)
ALT SERPL-CCNC: 33 U/L (ref 0–32)
ANION GAP SERPL CALCULATED.3IONS-SCNC: 13 MMOL/L (ref 7–16)
AST SERPL-CCNC: 29 U/L (ref 0–31)
BASOPHILS # BLD: 0.08 K/UL (ref 0–0.2)
BASOPHILS NFR BLD: 2 % (ref 0–2)
BILIRUB SERPL-MCNC: 0.7 MG/DL (ref 0–1.2)
BNP SERPL-MCNC: 8948 PG/ML (ref 0–125)
BUN SERPL-MCNC: 19 MG/DL (ref 6–23)
CALCIUM SERPL-MCNC: 9.2 MG/DL (ref 8.6–10.2)
CHLORIDE SERPL-SCNC: 98 MMOL/L (ref 98–107)
CO2 SERPL-SCNC: 28 MMOL/L (ref 22–29)
CREAT SERPL-MCNC: 0.7 MG/DL (ref 0.5–1)
EOSINOPHIL # BLD: 0.17 K/UL (ref 0.05–0.5)
EOSINOPHILS RELATIVE PERCENT: 4 % (ref 0–6)
ERYTHROCYTE [DISTWIDTH] IN BLOOD BY AUTOMATED COUNT: 16.6 % (ref 11.5–15)
GFR, ESTIMATED: >90 ML/MIN/1.73M2
GLUCOSE BLD-MCNC: 120 MG/DL (ref 74–99)
GLUCOSE BLD-MCNC: 141 MG/DL (ref 74–99)
GLUCOSE BLD-MCNC: 152 MG/DL (ref 74–99)
GLUCOSE BLD-MCNC: 169 MG/DL (ref 74–99)
GLUCOSE BLD-MCNC: 174 MG/DL (ref 74–99)
GLUCOSE SERPL-MCNC: 70 MG/DL (ref 74–99)
HCT VFR BLD AUTO: 28 % (ref 34–48)
HGB BLD-MCNC: 8.5 G/DL (ref 11.5–15.5)
LYMPHOCYTES NFR BLD: 0.29 K/UL (ref 1.5–4)
LYMPHOCYTES RELATIVE PERCENT: 6 % (ref 20–42)
MAGNESIUM SERPL-MCNC: 1.8 MG/DL (ref 1.6–2.6)
MCH RBC QN AUTO: 25.3 PG (ref 26–35)
MCHC RBC AUTO-ENTMCNC: 30.4 G/DL (ref 32–34.5)
MCV RBC AUTO: 83.3 FL (ref 80–99.9)
MONOCYTES NFR BLD: 0.21 K/UL (ref 0.1–0.95)
MONOCYTES NFR BLD: 4 % (ref 2–12)
NEUTROPHILS NFR BLD: 84 % (ref 43–80)
NEUTS SEG NFR BLD: 3.95 K/UL (ref 1.8–7.3)
PLATELET # BLD AUTO: 241 K/UL (ref 130–450)
PMV BLD AUTO: 10 FL (ref 7–12)
POC HCO3: 36.3 MMOL/L (ref 22–26)
POC O2 SATURATION: 99.3 % (ref 92–98.5)
POC PCO2: 47.5 MM HG (ref 35–45)
POC PH: 7.49 (ref 7.35–7.45)
POC PO2: 137.5 MM HG (ref 60–80)
POSITIVE BASE EXCESS, ART: 11.5 MMOL/L (ref 0–3)
POTASSIUM SERPL-SCNC: 3.4 MMOL/L (ref 3.5–5)
PROT SERPL-MCNC: 6.3 G/DL (ref 6.4–8.3)
RBC # BLD AUTO: 3.36 M/UL (ref 3.5–5.5)
RBC # BLD: ABNORMAL 10*6/UL
SODIUM SERPL-SCNC: 139 MMOL/L (ref 132–146)
WBC OTHER # BLD: 4.7 K/UL (ref 4.5–11.5)

## 2024-05-18 PROCEDURE — 82962 GLUCOSE BLOOD TEST: CPT

## 2024-05-18 PROCEDURE — 85025 COMPLETE CBC W/AUTO DIFF WBC: CPT

## 2024-05-18 PROCEDURE — 80053 COMPREHEN METABOLIC PANEL: CPT

## 2024-05-18 PROCEDURE — 83735 ASSAY OF MAGNESIUM: CPT

## 2024-05-18 PROCEDURE — 99232 SBSQ HOSP IP/OBS MODERATE 35: CPT | Performed by: FAMILY MEDICINE

## 2024-05-18 PROCEDURE — 6370000000 HC RX 637 (ALT 250 FOR IP)

## 2024-05-18 PROCEDURE — 94660 CPAP INITIATION&MGMT: CPT

## 2024-05-18 PROCEDURE — 83880 ASSAY OF NATRIURETIC PEPTIDE: CPT

## 2024-05-18 PROCEDURE — 2140000000 HC CCU INTERMEDIATE R&B

## 2024-05-18 PROCEDURE — 71045 X-RAY EXAM CHEST 1 VIEW: CPT

## 2024-05-18 PROCEDURE — 6370000000 HC RX 637 (ALT 250 FOR IP): Performed by: INTERNAL MEDICINE

## 2024-05-18 PROCEDURE — 2700000000 HC OXYGEN THERAPY PER DAY

## 2024-05-18 PROCEDURE — 94640 AIRWAY INHALATION TREATMENT: CPT

## 2024-05-18 PROCEDURE — 82803 BLOOD GASES ANY COMBINATION: CPT

## 2024-05-18 PROCEDURE — 2580000003 HC RX 258

## 2024-05-18 PROCEDURE — 6360000002 HC RX W HCPCS

## 2024-05-18 PROCEDURE — 36415 COLL VENOUS BLD VENIPUNCTURE: CPT

## 2024-05-18 RX ORDER — POTASSIUM CHLORIDE 20 MEQ/1
40 TABLET, EXTENDED RELEASE ORAL ONCE
Status: COMPLETED | OUTPATIENT
Start: 2024-05-18 | End: 2024-05-18

## 2024-05-18 RX ORDER — FUROSEMIDE 10 MG/ML
40 INJECTION INTRAMUSCULAR; INTRAVENOUS ONCE
Status: COMPLETED | OUTPATIENT
Start: 2024-05-18 | End: 2024-05-18

## 2024-05-18 RX ORDER — DEXTROSE MONOHYDRATE 100 MG/ML
INJECTION, SOLUTION INTRAVENOUS CONTINUOUS PRN
Status: DISCONTINUED | OUTPATIENT
Start: 2024-05-18 | End: 2024-06-03 | Stop reason: HOSPADM

## 2024-05-18 RX ORDER — GLUCAGON 1 MG/ML
1 KIT INJECTION PRN
Status: DISCONTINUED | OUTPATIENT
Start: 2024-05-18 | End: 2024-06-03 | Stop reason: HOSPADM

## 2024-05-18 RX ADMIN — HYDROCORTISONE 5 MG: 5 TABLET ORAL at 21:29

## 2024-05-18 RX ADMIN — METOPROLOL SUCCINATE 50 MG: 50 TABLET, EXTENDED RELEASE ORAL at 22:08

## 2024-05-18 RX ADMIN — OMEGA-3-ACID ETHYL ESTERS 2 G: 1 CAPSULE, LIQUID FILLED ORAL at 09:39

## 2024-05-18 RX ADMIN — BUDESONIDE INHALATION 500 MCG: 0.5 SUSPENSION RESPIRATORY (INHALATION) at 20:46

## 2024-05-18 RX ADMIN — MULTIVITAMIN TABLET 1 TABLET: TABLET at 09:40

## 2024-05-18 RX ADMIN — OMEGA-3-ACID ETHYL ESTERS 2 G: 1 CAPSULE, LIQUID FILLED ORAL at 21:29

## 2024-05-18 RX ADMIN — HYDROCORTISONE 10 MG: 5 TABLET ORAL at 09:39

## 2024-05-18 RX ADMIN — Medication 1000 UNITS: at 09:35

## 2024-05-18 RX ADMIN — DICLOFENAC SODIUM 2 G: 10 GEL TOPICAL at 09:40

## 2024-05-18 RX ADMIN — APIXABAN 5 MG: 5 TABLET, FILM COATED ORAL at 21:31

## 2024-05-18 RX ADMIN — SODIUM CHLORIDE, PRESERVATIVE FREE 10 ML: 5 INJECTION INTRAVENOUS at 10:14

## 2024-05-18 RX ADMIN — IPRATROPIUM BROMIDE AND ALBUTEROL SULFATE 1 DOSE: .5; 2.5 SOLUTION RESPIRATORY (INHALATION) at 09:10

## 2024-05-18 RX ADMIN — SODIUM CHLORIDE, PRESERVATIVE FREE 10 ML: 5 INJECTION INTRAVENOUS at 21:29

## 2024-05-18 RX ADMIN — POTASSIUM CHLORIDE 40 MEQ: 1500 TABLET, EXTENDED RELEASE ORAL at 09:35

## 2024-05-18 RX ADMIN — BUDESONIDE INHALATION 500 MCG: 0.5 SUSPENSION RESPIRATORY (INHALATION) at 09:10

## 2024-05-18 RX ADMIN — DESMOPRESSIN ACETATE 200 MCG: 0.1 TABLET ORAL at 09:40

## 2024-05-18 RX ADMIN — ACETAMINOPHEN 650 MG: 325 TABLET ORAL at 05:53

## 2024-05-18 RX ADMIN — LEVOTHYROXINE SODIUM 50 MCG: 0.05 TABLET ORAL at 05:53

## 2024-05-18 RX ADMIN — SILDENAFIL 10 MG: 20 TABLET, FILM COATED ORAL at 09:40

## 2024-05-18 RX ADMIN — IPRATROPIUM BROMIDE AND ALBUTEROL SULFATE 1 DOSE: .5; 2.5 SOLUTION RESPIRATORY (INHALATION) at 13:24

## 2024-05-18 RX ADMIN — DESMOPRESSIN ACETATE 200 MCG: 0.1 TABLET ORAL at 21:31

## 2024-05-18 RX ADMIN — DULOXETINE HYDROCHLORIDE 60 MG: 60 CAPSULE, DELAYED RELEASE ORAL at 09:34

## 2024-05-18 RX ADMIN — FUROSEMIDE 40 MG: 40 TABLET ORAL at 09:34

## 2024-05-18 RX ADMIN — FERROUS SULFATE TAB 325 MG (65 MG ELEMENTAL FE) 325 MG: 325 (65 FE) TAB at 21:30

## 2024-05-18 RX ADMIN — DIGOXIN 62.5 MCG: 0.12 TABLET ORAL at 09:34

## 2024-05-18 RX ADMIN — ACETAMINOPHEN 650 MG: 325 TABLET ORAL at 21:29

## 2024-05-18 RX ADMIN — LEVETIRACETAM 500 MG: 500 TABLET, FILM COATED ORAL at 21:30

## 2024-05-18 RX ADMIN — ARFORMOTEROL TARTRATE 15 MCG: 15 SOLUTION RESPIRATORY (INHALATION) at 09:10

## 2024-05-18 RX ADMIN — IPRATROPIUM BROMIDE AND ALBUTEROL SULFATE 1 DOSE: .5; 2.5 SOLUTION RESPIRATORY (INHALATION) at 20:46

## 2024-05-18 RX ADMIN — DEXTROSE MONOHYDRATE 125 ML: 100 INJECTION, SOLUTION INTRAVENOUS at 00:19

## 2024-05-18 RX ADMIN — ARFORMOTEROL TARTRATE 15 MCG: 15 SOLUTION RESPIRATORY (INHALATION) at 20:46

## 2024-05-18 RX ADMIN — FUROSEMIDE 40 MG: 10 INJECTION, SOLUTION INTRAMUSCULAR; INTRAVENOUS at 10:14

## 2024-05-18 RX ADMIN — ACETAMINOPHEN 650 MG: 325 TABLET ORAL at 14:09

## 2024-05-18 RX ADMIN — METOPROLOL SUCCINATE 50 MG: 50 TABLET, EXTENDED RELEASE ORAL at 09:34

## 2024-05-18 RX ADMIN — SILDENAFIL 10 MG: 20 TABLET, FILM COATED ORAL at 14:09

## 2024-05-18 RX ADMIN — FERROUS SULFATE TAB 325 MG (65 MG ELEMENTAL FE) 325 MG: 325 (65 FE) TAB at 09:35

## 2024-05-18 RX ADMIN — LEVETIRACETAM 500 MG: 500 TABLET, FILM COATED ORAL at 09:35

## 2024-05-18 RX ADMIN — APIXABAN 5 MG: 5 TABLET, FILM COATED ORAL at 09:34

## 2024-05-18 RX ADMIN — IPRATROPIUM BROMIDE AND ALBUTEROL SULFATE 1 DOSE: .5; 2.5 SOLUTION RESPIRATORY (INHALATION) at 16:18

## 2024-05-18 RX ADMIN — AMLODIPINE BESYLATE 5 MG: 5 TABLET ORAL at 09:34

## 2024-05-18 RX ADMIN — SILDENAFIL 10 MG: 20 TABLET, FILM COATED ORAL at 21:30

## 2024-05-18 ASSESSMENT — PAIN - FUNCTIONAL ASSESSMENT: PAIN_FUNCTIONAL_ASSESSMENT: ACTIVITIES ARE NOT PREVENTED

## 2024-05-19 ENCOUNTER — APPOINTMENT (OUTPATIENT)
Dept: GENERAL RADIOLOGY | Age: 69
DRG: 208 | End: 2024-05-19
Payer: MEDICARE

## 2024-05-19 LAB
ALBUMIN SERPL-MCNC: 3.5 G/DL (ref 3.5–5.2)
ALP SERPL-CCNC: 158 U/L (ref 35–104)
ALT SERPL-CCNC: 28 U/L (ref 0–32)
ANION GAP SERPL CALCULATED.3IONS-SCNC: 15 MMOL/L (ref 7–16)
AST SERPL-CCNC: 21 U/L (ref 0–31)
BASOPHILS # BLD: 0.08 K/UL (ref 0–0.2)
BASOPHILS NFR BLD: 2 % (ref 0–2)
BILIRUB SERPL-MCNC: 0.5 MG/DL (ref 0–1.2)
BNP SERPL-MCNC: 8328 PG/ML (ref 0–125)
BUN SERPL-MCNC: 33 MG/DL (ref 6–23)
CALCIUM SERPL-MCNC: 9.1 MG/DL (ref 8.6–10.2)
CHLORIDE SERPL-SCNC: 98 MMOL/L (ref 98–107)
CO2 SERPL-SCNC: 28 MMOL/L (ref 22–29)
CREAT SERPL-MCNC: 1.4 MG/DL (ref 0.5–1)
EOSINOPHIL # BLD: 0.08 K/UL (ref 0.05–0.5)
EOSINOPHILS RELATIVE PERCENT: 2 % (ref 0–6)
ERYTHROCYTE [DISTWIDTH] IN BLOOD BY AUTOMATED COUNT: 16.4 % (ref 11.5–15)
GFR, ESTIMATED: 41 ML/MIN/1.73M2
GLUCOSE BLD-MCNC: 104 MG/DL (ref 74–99)
GLUCOSE BLD-MCNC: 111 MG/DL (ref 74–99)
GLUCOSE BLD-MCNC: 116 MG/DL (ref 74–99)
GLUCOSE BLD-MCNC: 179 MG/DL (ref 74–99)
GLUCOSE SERPL-MCNC: 85 MG/DL (ref 74–99)
HCT VFR BLD AUTO: 28.7 % (ref 34–48)
HGB BLD-MCNC: 8.8 G/DL (ref 11.5–15.5)
LYMPHOCYTES NFR BLD: 0.42 K/UL (ref 1.5–4)
LYMPHOCYTES RELATIVE PERCENT: 9 % (ref 20–42)
MCH RBC QN AUTO: 25.7 PG (ref 26–35)
MCHC RBC AUTO-ENTMCNC: 30.7 G/DL (ref 32–34.5)
MCV RBC AUTO: 83.9 FL (ref 80–99.9)
MONOCYTES NFR BLD: 0.38 K/UL (ref 0.1–0.95)
MONOCYTES NFR BLD: 8 % (ref 2–12)
NEUTROPHILS NFR BLD: 80 % (ref 43–80)
NEUTS SEG NFR BLD: 3.82 K/UL (ref 1.8–7.3)
PLATELET # BLD AUTO: 266 K/UL (ref 130–450)
PMV BLD AUTO: 10.5 FL (ref 7–12)
POTASSIUM SERPL-SCNC: 4.2 MMOL/L (ref 3.5–5)
PROT SERPL-MCNC: 6.3 G/DL (ref 6.4–8.3)
RBC # BLD AUTO: 3.42 M/UL (ref 3.5–5.5)
RBC # BLD: ABNORMAL 10*6/UL
SODIUM SERPL-SCNC: 141 MMOL/L (ref 132–146)
WBC OTHER # BLD: 4.8 K/UL (ref 4.5–11.5)

## 2024-05-19 PROCEDURE — 2580000003 HC RX 258

## 2024-05-19 PROCEDURE — 2140000000 HC CCU INTERMEDIATE R&B

## 2024-05-19 PROCEDURE — 85025 COMPLETE CBC W/AUTO DIFF WBC: CPT

## 2024-05-19 PROCEDURE — 71045 X-RAY EXAM CHEST 1 VIEW: CPT

## 2024-05-19 PROCEDURE — 80053 COMPREHEN METABOLIC PANEL: CPT

## 2024-05-19 PROCEDURE — 99232 SBSQ HOSP IP/OBS MODERATE 35: CPT | Performed by: FAMILY MEDICINE

## 2024-05-19 PROCEDURE — 6370000000 HC RX 637 (ALT 250 FOR IP)

## 2024-05-19 PROCEDURE — 36415 COLL VENOUS BLD VENIPUNCTURE: CPT

## 2024-05-19 PROCEDURE — 51798 US URINE CAPACITY MEASURE: CPT

## 2024-05-19 PROCEDURE — 82962 GLUCOSE BLOOD TEST: CPT

## 2024-05-19 PROCEDURE — 83880 ASSAY OF NATRIURETIC PEPTIDE: CPT

## 2024-05-19 PROCEDURE — 2700000000 HC OXYGEN THERAPY PER DAY

## 2024-05-19 PROCEDURE — 6360000002 HC RX W HCPCS

## 2024-05-19 PROCEDURE — 94640 AIRWAY INHALATION TREATMENT: CPT

## 2024-05-19 PROCEDURE — 94660 CPAP INITIATION&MGMT: CPT

## 2024-05-19 RX ORDER — FUROSEMIDE 10 MG/ML
40 INJECTION INTRAMUSCULAR; INTRAVENOUS DAILY
Status: DISCONTINUED | OUTPATIENT
Start: 2024-05-19 | End: 2024-05-21

## 2024-05-19 RX ORDER — TETRAHYDROZOLINE HCL 0.05 %
1 DROPS OPHTHALMIC (EYE) 3 TIMES DAILY
Status: DISCONTINUED | OUTPATIENT
Start: 2024-05-19 | End: 2024-06-01

## 2024-05-19 RX ORDER — DULOXETIN HYDROCHLORIDE 30 MG/1
30 CAPSULE, DELAYED RELEASE ORAL DAILY
Status: DISCONTINUED | OUTPATIENT
Start: 2024-05-19 | End: 2024-05-24

## 2024-05-19 RX ORDER — FUROSEMIDE 10 MG/ML
40 INJECTION INTRAMUSCULAR; INTRAVENOUS ONCE
Status: COMPLETED | OUTPATIENT
Start: 2024-05-19 | End: 2024-05-19

## 2024-05-19 RX ORDER — FUROSEMIDE 10 MG/ML
40 INJECTION INTRAMUSCULAR; INTRAVENOUS ONCE
Status: DISCONTINUED | OUTPATIENT
Start: 2024-05-19 | End: 2024-05-19

## 2024-05-19 RX ADMIN — IPRATROPIUM BROMIDE AND ALBUTEROL SULFATE 1 DOSE: .5; 2.5 SOLUTION RESPIRATORY (INHALATION) at 15:45

## 2024-05-19 RX ADMIN — LEVETIRACETAM 500 MG: 500 TABLET, FILM COATED ORAL at 23:08

## 2024-05-19 RX ADMIN — CYCLOBENZAPRINE 10 MG: 10 TABLET, FILM COATED ORAL at 05:15

## 2024-05-19 RX ADMIN — IPRATROPIUM BROMIDE AND ALBUTEROL SULFATE 1 DOSE: .5; 2.5 SOLUTION RESPIRATORY (INHALATION) at 08:01

## 2024-05-19 RX ADMIN — SILDENAFIL 10 MG: 20 TABLET, FILM COATED ORAL at 23:15

## 2024-05-19 RX ADMIN — APIXABAN 5 MG: 5 TABLET, FILM COATED ORAL at 10:29

## 2024-05-19 RX ADMIN — DICLOFENAC SODIUM 2 G: 10 GEL TOPICAL at 23:14

## 2024-05-19 RX ADMIN — SODIUM CHLORIDE, PRESERVATIVE FREE 10 ML: 5 INJECTION INTRAVENOUS at 10:30

## 2024-05-19 RX ADMIN — Medication 1000 UNITS: at 10:29

## 2024-05-19 RX ADMIN — DULOXETINE HYDROCHLORIDE 30 MG: 30 CAPSULE, DELAYED RELEASE ORAL at 10:28

## 2024-05-19 RX ADMIN — METOPROLOL SUCCINATE 50 MG: 50 TABLET, EXTENDED RELEASE ORAL at 10:29

## 2024-05-19 RX ADMIN — OMEGA-3-ACID ETHYL ESTERS 2 G: 1 CAPSULE, LIQUID FILLED ORAL at 10:29

## 2024-05-19 RX ADMIN — SODIUM CHLORIDE, PRESERVATIVE FREE 10 ML: 5 INJECTION INTRAVENOUS at 23:09

## 2024-05-19 RX ADMIN — APIXABAN 5 MG: 5 TABLET, FILM COATED ORAL at 23:09

## 2024-05-19 RX ADMIN — FERROUS SULFATE TAB 325 MG (65 MG ELEMENTAL FE) 325 MG: 325 (65 FE) TAB at 23:08

## 2024-05-19 RX ADMIN — BUDESONIDE INHALATION 500 MCG: 0.5 SUSPENSION RESPIRATORY (INHALATION) at 08:01

## 2024-05-19 RX ADMIN — LEVOTHYROXINE SODIUM 50 MCG: 0.05 TABLET ORAL at 05:14

## 2024-05-19 RX ADMIN — DESMOPRESSIN ACETATE 200 MCG: 0.1 TABLET ORAL at 10:29

## 2024-05-19 RX ADMIN — ARFORMOTEROL TARTRATE 15 MCG: 15 SOLUTION RESPIRATORY (INHALATION) at 20:03

## 2024-05-19 RX ADMIN — GUAIFENESIN 400 MG: 400 TABLET ORAL at 23:08

## 2024-05-19 RX ADMIN — MULTIVITAMIN TABLET 1 TABLET: TABLET at 10:29

## 2024-05-19 RX ADMIN — DESMOPRESSIN ACETATE 200 MCG: 0.1 TABLET ORAL at 23:12

## 2024-05-19 RX ADMIN — LEVETIRACETAM 500 MG: 500 TABLET, FILM COATED ORAL at 10:28

## 2024-05-19 RX ADMIN — BUDESONIDE INHALATION 500 MCG: 0.5 SUSPENSION RESPIRATORY (INHALATION) at 20:03

## 2024-05-19 RX ADMIN — CYCLOBENZAPRINE 10 MG: 10 TABLET, FILM COATED ORAL at 23:08

## 2024-05-19 RX ADMIN — OMEGA-3-ACID ETHYL ESTERS 2 G: 1 CAPSULE, LIQUID FILLED ORAL at 23:16

## 2024-05-19 RX ADMIN — FUROSEMIDE 40 MG: 10 INJECTION, SOLUTION INTRAMUSCULAR; INTRAVENOUS at 10:30

## 2024-05-19 RX ADMIN — IPRATROPIUM BROMIDE AND ALBUTEROL SULFATE 1 DOSE: .5; 2.5 SOLUTION RESPIRATORY (INHALATION) at 20:03

## 2024-05-19 RX ADMIN — FERROUS SULFATE TAB 325 MG (65 MG ELEMENTAL FE) 325 MG: 325 (65 FE) TAB at 10:29

## 2024-05-19 RX ADMIN — ACETAMINOPHEN 650 MG: 325 TABLET ORAL at 14:34

## 2024-05-19 RX ADMIN — FUROSEMIDE 40 MG: 10 INJECTION, SOLUTION INTRAMUSCULAR; INTRAVENOUS at 17:17

## 2024-05-19 RX ADMIN — DIGOXIN 62.5 MCG: 0.12 TABLET ORAL at 10:29

## 2024-05-19 RX ADMIN — ACETAMINOPHEN 650 MG: 325 TABLET ORAL at 23:08

## 2024-05-19 RX ADMIN — ACETAMINOPHEN 650 MG: 325 TABLET ORAL at 05:13

## 2024-05-19 RX ADMIN — AMLODIPINE BESYLATE 5 MG: 5 TABLET ORAL at 10:30

## 2024-05-19 RX ADMIN — HYDROCORTISONE 10 MG: 5 TABLET ORAL at 10:29

## 2024-05-19 RX ADMIN — HYDROCORTISONE 5 MG: 5 TABLET ORAL at 23:12

## 2024-05-19 RX ADMIN — SILDENAFIL 10 MG: 20 TABLET, FILM COATED ORAL at 14:35

## 2024-05-19 RX ADMIN — ARFORMOTEROL TARTRATE 15 MCG: 15 SOLUTION RESPIRATORY (INHALATION) at 08:01

## 2024-05-19 RX ADMIN — SILDENAFIL 10 MG: 20 TABLET, FILM COATED ORAL at 10:29

## 2024-05-19 ASSESSMENT — PAIN SCALES - GENERAL
PAINLEVEL_OUTOF10: 2
PAINLEVEL_OUTOF10: 5
PAINLEVEL_OUTOF10: 6

## 2024-05-19 ASSESSMENT — PAIN DESCRIPTION - LOCATION
LOCATION: BACK
LOCATION: BACK

## 2024-05-19 ASSESSMENT — PAIN DESCRIPTION - DESCRIPTORS
DESCRIPTORS: ACHING
DESCRIPTORS: ACHING

## 2024-05-20 ENCOUNTER — APPOINTMENT (OUTPATIENT)
Dept: GENERAL RADIOLOGY | Age: 69
DRG: 208 | End: 2024-05-20
Payer: MEDICARE

## 2024-05-20 LAB
ALBUMIN SERPL-MCNC: 3.4 G/DL (ref 3.5–5.2)
ALP SERPL-CCNC: 143 U/L (ref 35–104)
ALT SERPL-CCNC: 23 U/L (ref 0–32)
ANION GAP SERPL CALCULATED.3IONS-SCNC: 14 MMOL/L (ref 7–16)
AST SERPL-CCNC: 19 U/L (ref 0–31)
BASOPHILS # BLD: 0.03 K/UL (ref 0–0.2)
BASOPHILS NFR BLD: 1 % (ref 0–2)
BILIRUB SERPL-MCNC: 0.5 MG/DL (ref 0–1.2)
BNP SERPL-MCNC: 6763 PG/ML (ref 0–125)
BUN SERPL-MCNC: 31 MG/DL (ref 6–23)
CALCIUM SERPL-MCNC: 8.8 MG/DL (ref 8.6–10.2)
CHLORIDE SERPL-SCNC: 99 MMOL/L (ref 98–107)
CO2 SERPL-SCNC: 30 MMOL/L (ref 22–29)
CREAT SERPL-MCNC: 1.1 MG/DL (ref 0.5–1)
EOSINOPHIL # BLD: 0.17 K/UL (ref 0.05–0.5)
EOSINOPHILS RELATIVE PERCENT: 3 % (ref 0–6)
ERYTHROCYTE [DISTWIDTH] IN BLOOD BY AUTOMATED COUNT: 16.5 % (ref 11.5–15)
GFR, ESTIMATED: 53 ML/MIN/1.73M2
GLUCOSE BLD-MCNC: 114 MG/DL (ref 74–99)
GLUCOSE BLD-MCNC: 148 MG/DL (ref 74–99)
GLUCOSE BLD-MCNC: 149 MG/DL (ref 74–99)
GLUCOSE BLD-MCNC: 86 MG/DL (ref 74–99)
GLUCOSE SERPL-MCNC: 86 MG/DL (ref 74–99)
HCT VFR BLD AUTO: 29.1 % (ref 34–48)
HGB BLD-MCNC: 8.7 G/DL (ref 11.5–15.5)
IMM GRANULOCYTES # BLD AUTO: 0.03 K/UL (ref 0–0.58)
IMM GRANULOCYTES NFR BLD: 1 % (ref 0–5)
LYMPHOCYTES NFR BLD: 0.43 K/UL (ref 1.5–4)
LYMPHOCYTES RELATIVE PERCENT: 9 % (ref 20–42)
MAGNESIUM SERPL-MCNC: 2 MG/DL (ref 1.6–2.6)
MCH RBC QN AUTO: 25.3 PG (ref 26–35)
MCHC RBC AUTO-ENTMCNC: 29.9 G/DL (ref 32–34.5)
MCV RBC AUTO: 84.6 FL (ref 80–99.9)
MONOCYTES NFR BLD: 0.6 K/UL (ref 0.1–0.95)
MONOCYTES NFR BLD: 12 % (ref 2–12)
NEUTROPHILS NFR BLD: 75 % (ref 43–80)
NEUTS SEG NFR BLD: 3.75 K/UL (ref 1.8–7.3)
PLATELET # BLD AUTO: 262 K/UL (ref 130–450)
PMV BLD AUTO: 10 FL (ref 7–12)
POTASSIUM SERPL-SCNC: 3.4 MMOL/L (ref 3.5–5)
PROT SERPL-MCNC: 6 G/DL (ref 6.4–8.3)
RBC # BLD AUTO: 3.44 M/UL (ref 3.5–5.5)
RBC # BLD: ABNORMAL 10*6/UL
SODIUM SERPL-SCNC: 143 MMOL/L (ref 132–146)
WBC OTHER # BLD: 5 K/UL (ref 4.5–11.5)

## 2024-05-20 PROCEDURE — 94660 CPAP INITIATION&MGMT: CPT

## 2024-05-20 PROCEDURE — 6370000000 HC RX 637 (ALT 250 FOR IP)

## 2024-05-20 PROCEDURE — 36415 COLL VENOUS BLD VENIPUNCTURE: CPT

## 2024-05-20 PROCEDURE — 85025 COMPLETE CBC W/AUTO DIFF WBC: CPT

## 2024-05-20 PROCEDURE — 80053 COMPREHEN METABOLIC PANEL: CPT

## 2024-05-20 PROCEDURE — 71045 X-RAY EXAM CHEST 1 VIEW: CPT

## 2024-05-20 PROCEDURE — 83880 ASSAY OF NATRIURETIC PEPTIDE: CPT

## 2024-05-20 PROCEDURE — 6360000002 HC RX W HCPCS

## 2024-05-20 PROCEDURE — 82962 GLUCOSE BLOOD TEST: CPT

## 2024-05-20 PROCEDURE — 99232 SBSQ HOSP IP/OBS MODERATE 35: CPT

## 2024-05-20 PROCEDURE — 83735 ASSAY OF MAGNESIUM: CPT

## 2024-05-20 PROCEDURE — 94640 AIRWAY INHALATION TREATMENT: CPT

## 2024-05-20 PROCEDURE — 2580000003 HC RX 258

## 2024-05-20 PROCEDURE — 2700000000 HC OXYGEN THERAPY PER DAY

## 2024-05-20 PROCEDURE — 2140000000 HC CCU INTERMEDIATE R&B

## 2024-05-20 RX ADMIN — FERROUS SULFATE TAB 325 MG (65 MG ELEMENTAL FE) 325 MG: 325 (65 FE) TAB at 20:49

## 2024-05-20 RX ADMIN — IPRATROPIUM BROMIDE AND ALBUTEROL SULFATE 1 DOSE: .5; 2.5 SOLUTION RESPIRATORY (INHALATION) at 19:48

## 2024-05-20 RX ADMIN — IPRATROPIUM BROMIDE AND ALBUTEROL SULFATE 1 DOSE: .5; 2.5 SOLUTION RESPIRATORY (INHALATION) at 11:42

## 2024-05-20 RX ADMIN — DESMOPRESSIN ACETATE 200 MCG: 0.1 TABLET ORAL at 12:00

## 2024-05-20 RX ADMIN — OMEGA-3-ACID ETHYL ESTERS 2 G: 1 CAPSULE, LIQUID FILLED ORAL at 20:48

## 2024-05-20 RX ADMIN — BUDESONIDE INHALATION 500 MCG: 0.5 SUSPENSION RESPIRATORY (INHALATION) at 19:48

## 2024-05-20 RX ADMIN — SILDENAFIL 10 MG: 20 TABLET, FILM COATED ORAL at 20:48

## 2024-05-20 RX ADMIN — SODIUM CHLORIDE, PRESERVATIVE FREE 10 ML: 5 INJECTION INTRAVENOUS at 20:50

## 2024-05-20 RX ADMIN — OMEGA-3-ACID ETHYL ESTERS 2 G: 1 CAPSULE, LIQUID FILLED ORAL at 12:00

## 2024-05-20 RX ADMIN — CYCLOBENZAPRINE 10 MG: 10 TABLET, FILM COATED ORAL at 21:38

## 2024-05-20 RX ADMIN — BUDESONIDE INHALATION 500 MCG: 0.5 SUSPENSION RESPIRATORY (INHALATION) at 07:48

## 2024-05-20 RX ADMIN — SILDENAFIL 10 MG: 20 TABLET, FILM COATED ORAL at 16:55

## 2024-05-20 RX ADMIN — TETRAHYDROZOLINE HCL 0.05% 1 DROP: 0.05 SOLUTION/ DROPS INTRAOCULAR at 16:55

## 2024-05-20 RX ADMIN — DICLOFENAC SODIUM 2 G: 10 GEL TOPICAL at 11:58

## 2024-05-20 RX ADMIN — ACETAMINOPHEN 650 MG: 325 TABLET ORAL at 06:25

## 2024-05-20 RX ADMIN — HYDROCORTISONE 5 MG: 5 TABLET ORAL at 20:49

## 2024-05-20 RX ADMIN — MULTIVITAMIN TABLET 1 TABLET: TABLET at 12:02

## 2024-05-20 RX ADMIN — IPRATROPIUM BROMIDE AND ALBUTEROL SULFATE 1 DOSE: .5; 2.5 SOLUTION RESPIRATORY (INHALATION) at 07:48

## 2024-05-20 RX ADMIN — ACETAMINOPHEN 650 MG: 325 TABLET ORAL at 16:55

## 2024-05-20 RX ADMIN — SILDENAFIL 10 MG: 20 TABLET, FILM COATED ORAL at 12:01

## 2024-05-20 RX ADMIN — SODIUM CHLORIDE, PRESERVATIVE FREE 10 ML: 5 INJECTION INTRAVENOUS at 12:26

## 2024-05-20 RX ADMIN — ARFORMOTEROL TARTRATE 15 MCG: 15 SOLUTION RESPIRATORY (INHALATION) at 19:49

## 2024-05-20 RX ADMIN — APIXABAN 5 MG: 5 TABLET, FILM COATED ORAL at 20:49

## 2024-05-20 RX ADMIN — METOPROLOL SUCCINATE 50 MG: 50 TABLET, EXTENDED RELEASE ORAL at 12:01

## 2024-05-20 RX ADMIN — FUROSEMIDE 40 MG: 10 INJECTION, SOLUTION INTRAMUSCULAR; INTRAVENOUS at 12:02

## 2024-05-20 RX ADMIN — DIGOXIN 62.5 MCG: 0.12 TABLET ORAL at 12:01

## 2024-05-20 RX ADMIN — Medication 1000 UNITS: at 12:16

## 2024-05-20 RX ADMIN — FERROUS SULFATE TAB 325 MG (65 MG ELEMENTAL FE) 325 MG: 325 (65 FE) TAB at 12:01

## 2024-05-20 RX ADMIN — TETRAHYDROZOLINE HCL 0.05% 1 DROP: 0.05 SOLUTION/ DROPS INTRAOCULAR at 20:49

## 2024-05-20 RX ADMIN — DESMOPRESSIN ACETATE 200 MCG: 0.1 TABLET ORAL at 20:48

## 2024-05-20 RX ADMIN — APIXABAN 5 MG: 5 TABLET, FILM COATED ORAL at 12:00

## 2024-05-20 RX ADMIN — ACETAMINOPHEN 650 MG: 325 TABLET ORAL at 20:49

## 2024-05-20 RX ADMIN — IPRATROPIUM BROMIDE AND ALBUTEROL SULFATE 1 DOSE: .5; 2.5 SOLUTION RESPIRATORY (INHALATION) at 14:04

## 2024-05-20 RX ADMIN — LEVETIRACETAM 500 MG: 500 TABLET, FILM COATED ORAL at 12:01

## 2024-05-20 RX ADMIN — ARFORMOTEROL TARTRATE 15 MCG: 15 SOLUTION RESPIRATORY (INHALATION) at 07:48

## 2024-05-20 RX ADMIN — AMLODIPINE BESYLATE 5 MG: 5 TABLET ORAL at 12:01

## 2024-05-20 RX ADMIN — LEVOTHYROXINE SODIUM 50 MCG: 0.05 TABLET ORAL at 06:26

## 2024-05-20 RX ADMIN — DULOXETINE HYDROCHLORIDE 30 MG: 30 CAPSULE, DELAYED RELEASE ORAL at 12:00

## 2024-05-20 RX ADMIN — LEVETIRACETAM 500 MG: 500 TABLET, FILM COATED ORAL at 20:49

## 2024-05-20 RX ADMIN — DICLOFENAC SODIUM 2 G: 10 GEL TOPICAL at 20:49

## 2024-05-20 RX ADMIN — METOPROLOL SUCCINATE 50 MG: 50 TABLET, EXTENDED RELEASE ORAL at 20:49

## 2024-05-20 RX ADMIN — POTASSIUM BICARBONATE 40 MEQ: 782 TABLET, EFFERVESCENT ORAL at 12:15

## 2024-05-20 RX ADMIN — HYDROCORTISONE 10 MG: 5 TABLET ORAL at 12:02

## 2024-05-20 RX ADMIN — TETRAHYDROZOLINE HCL 0.05% 1 DROP: 0.05 SOLUTION/ DROPS INTRAOCULAR at 11:58

## 2024-05-20 ASSESSMENT — PAIN DESCRIPTION - DESCRIPTORS
DESCRIPTORS: ACHING;DISCOMFORT;DULL
DESCRIPTORS: ACHING
DESCRIPTORS: ACHING;DISCOMFORT;DULL

## 2024-05-20 ASSESSMENT — PAIN - FUNCTIONAL ASSESSMENT
PAIN_FUNCTIONAL_ASSESSMENT: ACTIVITIES ARE NOT PREVENTED

## 2024-05-20 ASSESSMENT — PAIN SCALES - GENERAL
PAINLEVEL_OUTOF10: 2
PAINLEVEL_OUTOF10: 4
PAINLEVEL_OUTOF10: 7
PAINLEVEL_OUTOF10: 0

## 2024-05-20 ASSESSMENT — PAIN DESCRIPTION - LOCATION
LOCATION: BACK
LOCATION: SHOULDER
LOCATION: SHOULDER

## 2024-05-20 ASSESSMENT — PAIN DESCRIPTION - ORIENTATION
ORIENTATION: RIGHT
ORIENTATION: RIGHT

## 2024-05-20 NOTE — ACP (ADVANCE CARE PLANNING)
Advance Care Planning   The patient has the following advanced directives on file:  Advance Directives       Power of  Living Will ACP-Advance Directive ACP-Power of     Not on File Not on File Filed Not on File            The patient has appointed the following active healthcare agents:    Primary Decision Maker: Eric Rodriguez - Spouse - [phone]    Secondary Decision Maker: IRINABONYELIER - Child - [phone]    The Patient has the following current code status:    Code Status: Full Code    Misty Norman, JIGAR  5/20/2024

## 2024-05-21 ENCOUNTER — APPOINTMENT (OUTPATIENT)
Age: 69
DRG: 208 | End: 2024-05-21
Payer: MEDICARE

## 2024-05-21 LAB
ALBUMIN SERPL-MCNC: 3.5 G/DL (ref 3.5–5.2)
ALP SERPL-CCNC: 144 U/L (ref 35–104)
ALT SERPL-CCNC: 19 U/L (ref 0–32)
ANION GAP SERPL CALCULATED.3IONS-SCNC: 14 MMOL/L (ref 7–16)
AST SERPL-CCNC: 17 U/L (ref 0–31)
BASOPHILS # BLD: 0.04 K/UL (ref 0–0.2)
BASOPHILS NFR BLD: 1 % (ref 0–2)
BILIRUB SERPL-MCNC: 0.4 MG/DL (ref 0–1.2)
BNP SERPL-MCNC: 6089 PG/ML (ref 0–125)
BUN SERPL-MCNC: 33 MG/DL (ref 6–23)
CALCIUM SERPL-MCNC: 9.2 MG/DL (ref 8.6–10.2)
CHLORIDE SERPL-SCNC: 100 MMOL/L (ref 98–107)
CO2 SERPL-SCNC: 32 MMOL/L (ref 22–29)
CREAT SERPL-MCNC: 1.2 MG/DL (ref 0.5–1)
ECHO AO ASC DIAM: 2.1 CM
ECHO AO ASCENDING AORTA INDEX: 1.32 CM/M2
ECHO AO SINUS VALSALVA DIAM: 2.8 CM
ECHO AO SINUS VALSALVA INDEX: 1.76 CM/M2
ECHO AV AREA PEAK VELOCITY: 1.8 CM2
ECHO AV AREA VTI: 1.7 CM2
ECHO AV AREA/BSA PEAK VELOCITY: 1.1 CM2/M2
ECHO AV AREA/BSA VTI: 1.1 CM2/M2
ECHO AV CUSP MM: 1.9 CM
ECHO AV MEAN GRADIENT: 3 MMHG
ECHO AV MEAN VELOCITY: 0.8 M/S
ECHO AV PEAK GRADIENT: 8 MMHG
ECHO AV PEAK VELOCITY: 1.4 M/S
ECHO AV VELOCITY RATIO: 0.64
ECHO AV VTI: 25.6 CM
ECHO BSA: 1.57 M2
ECHO EST RA PRESSURE: 8 MMHG
ECHO LA DIAMETER INDEX: 2.08 CM/M2
ECHO LA DIAMETER: 3.3 CM
ECHO LA VOL A-L A2C: 78 ML (ref 22–52)
ECHO LA VOL A-L A4C: 57 ML (ref 22–52)
ECHO LA VOL MOD A2C: 76 ML (ref 22–52)
ECHO LA VOL MOD A4C: 52 ML (ref 22–52)
ECHO LA VOLUME AREA LENGTH: 70 ML
ECHO LA VOLUME INDEX A-L A2C: 49 ML/M2 (ref 16–34)
ECHO LA VOLUME INDEX A-L A4C: 36 ML/M2 (ref 16–34)
ECHO LA VOLUME INDEX AREA LENGTH: 44 ML/M2 (ref 16–34)
ECHO LA VOLUME INDEX MOD A2C: 48 ML/M2 (ref 16–34)
ECHO LA VOLUME INDEX MOD A4C: 33 ML/M2 (ref 16–34)
ECHO LV FRACTIONAL SHORTENING: 41 % (ref 28–44)
ECHO LV INTERNAL DIMENSION DIASTOLE INDEX: 2.14 CM/M2
ECHO LV INTERNAL DIMENSION DIASTOLIC: 3.4 CM (ref 3.9–5.3)
ECHO LV INTERNAL DIMENSION SYSTOLIC INDEX: 1.26 CM/M2
ECHO LV INTERNAL DIMENSION SYSTOLIC: 2 CM
ECHO LV IVSD: 0.9 CM (ref 0.6–0.9)
ECHO LV IVSS: 1.2 CM
ECHO LV MASS 2D: 98.9 G (ref 67–162)
ECHO LV MASS INDEX 2D: 62.2 G/M2 (ref 43–95)
ECHO LV POSTERIOR WALL DIASTOLIC: 1.1 CM (ref 0.6–0.9)
ECHO LV POSTERIOR WALL SYSTOLIC: 1.4 CM
ECHO LV RELATIVE WALL THICKNESS RATIO: 0.65
ECHO LVOT AREA: 2.8 CM2
ECHO LVOT AV VTI INDEX: 0.61
ECHO LVOT DIAM: 1.9 CM
ECHO LVOT MEAN GRADIENT: 1 MMHG
ECHO LVOT PEAK GRADIENT: 3 MMHG
ECHO LVOT PEAK VELOCITY: 0.9 M/S
ECHO LVOT STROKE VOLUME INDEX: 27.6 ML/M2
ECHO LVOT SV: 43.9 ML
ECHO LVOT VTI: 15.5 CM
ECHO MV AREA PHT: 2.9 CM2
ECHO MV AREA VTI: 2.3 CM2
ECHO MV LVOT VTI INDEX: 1.25
ECHO MV MAX VELOCITY: 1 M/S
ECHO MV MEAN GRADIENT: 1 MMHG
ECHO MV MEAN VELOCITY: 0.4 M/S
ECHO MV PEAK GRADIENT: 4 MMHG
ECHO MV PRESSURE HALF TIME (PHT): 75.7 MS
ECHO MV VTI: 19.3 CM
ECHO PV MAX VELOCITY: 0.8 M/S
ECHO PV MEAN GRADIENT: 1 MMHG
ECHO PV MEAN VELOCITY: 0.5 M/S
ECHO PV PEAK GRADIENT: 3 MMHG
ECHO PV VTI: 14.4 CM
ECHO PVEIN PEAK D VELOCITY: 0.2 M/S
ECHO PVEIN PEAK S VELOCITY: 0.3 M/S
ECHO PVEIN S/D RATIO: 1.5
ECHO RIGHT VENTRICULAR SYSTOLIC PRESSURE (RVSP): 106 MMHG
ECHO RV INTERNAL DIMENSION: 4.1 CM
ECHO RV TAPSE: 1 CM (ref 1.7–?)
ECHO RVOT MEAN GRADIENT: 0 MMHG
ECHO RVOT PEAK GRADIENT: 1 MMHG
ECHO RVOT PEAK VELOCITY: 0.5 M/S
ECHO RVOT VTI: 9.4 CM
ECHO TV ALIASING VELOCITY (NYQUIST): 31 CM/S
ECHO TV REGURGITANT MAX VELOCITY: 4.95 M/S
ECHO TV REGURGITANT PEAK GRADIENT: 98 MMHG
EOSINOPHIL # BLD: 0.09 K/UL (ref 0.05–0.5)
EOSINOPHILS RELATIVE PERCENT: 2 % (ref 0–6)
ERYTHROCYTE [DISTWIDTH] IN BLOOD BY AUTOMATED COUNT: 16.7 % (ref 11.5–15)
GFR, ESTIMATED: 48 ML/MIN/1.73M2
GLUCOSE BLD-MCNC: 124 MG/DL (ref 74–99)
GLUCOSE BLD-MCNC: 152 MG/DL (ref 74–99)
GLUCOSE BLD-MCNC: 91 MG/DL (ref 74–99)
GLUCOSE BLD-MCNC: 96 MG/DL (ref 74–99)
GLUCOSE SERPL-MCNC: 87 MG/DL (ref 74–99)
HCT VFR BLD AUTO: 29.7 % (ref 34–48)
HGB BLD-MCNC: 8.9 G/DL (ref 11.5–15.5)
LYMPHOCYTES NFR BLD: 0.44 K/UL (ref 1.5–4)
LYMPHOCYTES RELATIVE PERCENT: 9 % (ref 20–42)
MCH RBC QN AUTO: 25.7 PG (ref 26–35)
MCHC RBC AUTO-ENTMCNC: 30 G/DL (ref 32–34.5)
MCV RBC AUTO: 85.8 FL (ref 80–99.9)
MONOCYTES NFR BLD: 0.35 K/UL (ref 0.1–0.95)
MONOCYTES NFR BLD: 7 % (ref 2–12)
NEUTROPHILS NFR BLD: 82 % (ref 43–80)
NEUTS SEG NFR BLD: 4.08 K/UL (ref 1.8–7.3)
PLATELET # BLD AUTO: 265 K/UL (ref 130–450)
PMV BLD AUTO: 10 FL (ref 7–12)
POTASSIUM SERPL-SCNC: 3.8 MMOL/L (ref 3.5–5)
PROT SERPL-MCNC: 6.1 G/DL (ref 6.4–8.3)
RBC # BLD AUTO: 3.46 M/UL (ref 3.5–5.5)
RBC # BLD: ABNORMAL 10*6/UL
SODIUM SERPL-SCNC: 146 MMOL/L (ref 132–146)
WBC OTHER # BLD: 5 K/UL (ref 4.5–11.5)

## 2024-05-21 PROCEDURE — 83880 ASSAY OF NATRIURETIC PEPTIDE: CPT

## 2024-05-21 PROCEDURE — 93306 TTE W/DOPPLER COMPLETE: CPT

## 2024-05-21 PROCEDURE — 80053 COMPREHEN METABOLIC PANEL: CPT

## 2024-05-21 PROCEDURE — 85025 COMPLETE CBC W/AUTO DIFF WBC: CPT

## 2024-05-21 PROCEDURE — 94660 CPAP INITIATION&MGMT: CPT

## 2024-05-21 PROCEDURE — 2580000003 HC RX 258

## 2024-05-21 PROCEDURE — 6360000002 HC RX W HCPCS

## 2024-05-21 PROCEDURE — 99232 SBSQ HOSP IP/OBS MODERATE 35: CPT | Performed by: FAMILY MEDICINE

## 2024-05-21 PROCEDURE — 6370000000 HC RX 637 (ALT 250 FOR IP)

## 2024-05-21 PROCEDURE — 94640 AIRWAY INHALATION TREATMENT: CPT

## 2024-05-21 PROCEDURE — 2140000000 HC CCU INTERMEDIATE R&B

## 2024-05-21 PROCEDURE — 2700000000 HC OXYGEN THERAPY PER DAY

## 2024-05-21 PROCEDURE — 36415 COLL VENOUS BLD VENIPUNCTURE: CPT

## 2024-05-21 PROCEDURE — 93306 TTE W/DOPPLER COMPLETE: CPT | Performed by: INTERNAL MEDICINE

## 2024-05-21 PROCEDURE — 82962 GLUCOSE BLOOD TEST: CPT

## 2024-05-21 RX ORDER — FUROSEMIDE 40 MG/1
40 TABLET ORAL DAILY
Status: DISCONTINUED | OUTPATIENT
Start: 2024-05-21 | End: 2024-05-29

## 2024-05-21 RX ADMIN — DESMOPRESSIN ACETATE 200 MCG: 0.1 TABLET ORAL at 20:17

## 2024-05-21 RX ADMIN — Medication 1000 UNITS: at 09:49

## 2024-05-21 RX ADMIN — TETRAHYDROZOLINE HCL 0.05% 1 DROP: 0.05 SOLUTION/ DROPS INTRAOCULAR at 09:55

## 2024-05-21 RX ADMIN — SILDENAFIL 10 MG: 20 TABLET, FILM COATED ORAL at 20:18

## 2024-05-21 RX ADMIN — METOPROLOL SUCCINATE 50 MG: 50 TABLET, EXTENDED RELEASE ORAL at 09:48

## 2024-05-21 RX ADMIN — DICLOFENAC SODIUM 2 G: 10 GEL TOPICAL at 09:49

## 2024-05-21 RX ADMIN — CYCLOBENZAPRINE 10 MG: 10 TABLET, FILM COATED ORAL at 20:17

## 2024-05-21 RX ADMIN — OMEGA-3-ACID ETHYL ESTERS 2 G: 1 CAPSULE, LIQUID FILLED ORAL at 20:17

## 2024-05-21 RX ADMIN — APIXABAN 5 MG: 5 TABLET, FILM COATED ORAL at 20:17

## 2024-05-21 RX ADMIN — ACETAMINOPHEN 650 MG: 325 TABLET ORAL at 20:17

## 2024-05-21 RX ADMIN — HYDROCORTISONE 5 MG: 5 TABLET ORAL at 20:18

## 2024-05-21 RX ADMIN — MULTIVITAMIN TABLET 1 TABLET: TABLET at 09:51

## 2024-05-21 RX ADMIN — FUROSEMIDE 40 MG: 10 INJECTION, SOLUTION INTRAMUSCULAR; INTRAVENOUS at 09:48

## 2024-05-21 RX ADMIN — FERROUS SULFATE TAB 325 MG (65 MG ELEMENTAL FE) 325 MG: 325 (65 FE) TAB at 20:17

## 2024-05-21 RX ADMIN — DICLOFENAC SODIUM 2 G: 10 GEL TOPICAL at 20:18

## 2024-05-21 RX ADMIN — ACETAMINOPHEN 650 MG: 325 TABLET ORAL at 05:19

## 2024-05-21 RX ADMIN — SODIUM CHLORIDE, PRESERVATIVE FREE 10 ML: 5 INJECTION INTRAVENOUS at 20:18

## 2024-05-21 RX ADMIN — BUDESONIDE INHALATION 500 MCG: 0.5 SUSPENSION RESPIRATORY (INHALATION) at 08:11

## 2024-05-21 RX ADMIN — TETRAHYDROZOLINE HCL 0.05% 1 DROP: 0.05 SOLUTION/ DROPS INTRAOCULAR at 20:18

## 2024-05-21 RX ADMIN — IPRATROPIUM BROMIDE AND ALBUTEROL SULFATE 1 DOSE: .5; 2.5 SOLUTION RESPIRATORY (INHALATION) at 15:45

## 2024-05-21 RX ADMIN — TETRAHYDROZOLINE HCL 0.05% 1 DROP: 0.05 SOLUTION/ DROPS INTRAOCULAR at 14:47

## 2024-05-21 RX ADMIN — OMEGA-3-ACID ETHYL ESTERS 2 G: 1 CAPSULE, LIQUID FILLED ORAL at 09:50

## 2024-05-21 RX ADMIN — ACETAMINOPHEN 650 MG: 325 TABLET ORAL at 14:47

## 2024-05-21 RX ADMIN — BUDESONIDE INHALATION 500 MCG: 0.5 SUSPENSION RESPIRATORY (INHALATION) at 18:25

## 2024-05-21 RX ADMIN — DULOXETINE HYDROCHLORIDE 30 MG: 30 CAPSULE, DELAYED RELEASE ORAL at 09:49

## 2024-05-21 RX ADMIN — ARFORMOTEROL TARTRATE 15 MCG: 15 SOLUTION RESPIRATORY (INHALATION) at 08:11

## 2024-05-21 RX ADMIN — LEVOTHYROXINE SODIUM 50 MCG: 0.05 TABLET ORAL at 05:19

## 2024-05-21 RX ADMIN — LEVETIRACETAM 500 MG: 500 TABLET, FILM COATED ORAL at 20:17

## 2024-05-21 RX ADMIN — FERROUS SULFATE TAB 325 MG (65 MG ELEMENTAL FE) 325 MG: 325 (65 FE) TAB at 09:49

## 2024-05-21 RX ADMIN — APIXABAN 5 MG: 5 TABLET, FILM COATED ORAL at 09:48

## 2024-05-21 RX ADMIN — HYDROCORTISONE 10 MG: 5 TABLET ORAL at 09:51

## 2024-05-21 RX ADMIN — LEVETIRACETAM 500 MG: 500 TABLET, FILM COATED ORAL at 09:49

## 2024-05-21 RX ADMIN — SILDENAFIL 10 MG: 20 TABLET, FILM COATED ORAL at 09:51

## 2024-05-21 RX ADMIN — SODIUM CHLORIDE, PRESERVATIVE FREE 10 ML: 5 INJECTION INTRAVENOUS at 09:48

## 2024-05-21 RX ADMIN — IPRATROPIUM BROMIDE AND ALBUTEROL SULFATE 1 DOSE: .5; 2.5 SOLUTION RESPIRATORY (INHALATION) at 11:39

## 2024-05-21 RX ADMIN — IPRATROPIUM BROMIDE AND ALBUTEROL SULFATE 1 DOSE: .5; 2.5 SOLUTION RESPIRATORY (INHALATION) at 08:11

## 2024-05-21 RX ADMIN — DIGOXIN 62.5 MCG: 0.12 TABLET ORAL at 09:48

## 2024-05-21 RX ADMIN — IPRATROPIUM BROMIDE AND ALBUTEROL SULFATE 1 DOSE: .5; 2.5 SOLUTION RESPIRATORY (INHALATION) at 18:25

## 2024-05-21 RX ADMIN — ARFORMOTEROL TARTRATE 15 MCG: 15 SOLUTION RESPIRATORY (INHALATION) at 18:25

## 2024-05-21 RX ADMIN — AMLODIPINE BESYLATE 5 MG: 5 TABLET ORAL at 09:48

## 2024-05-21 RX ADMIN — SILDENAFIL 10 MG: 20 TABLET, FILM COATED ORAL at 14:47

## 2024-05-21 RX ADMIN — METOPROLOL SUCCINATE 50 MG: 50 TABLET, EXTENDED RELEASE ORAL at 20:17

## 2024-05-21 RX ADMIN — DESMOPRESSIN ACETATE 200 MCG: 0.1 TABLET ORAL at 09:48

## 2024-05-21 ASSESSMENT — PAIN SCALES - GENERAL: PAINLEVEL_OUTOF10: 5

## 2024-05-22 ENCOUNTER — APPOINTMENT (OUTPATIENT)
Dept: GENERAL RADIOLOGY | Age: 69
DRG: 208 | End: 2024-05-22
Payer: MEDICARE

## 2024-05-22 LAB
ALBUMIN SERPL-MCNC: 3.7 G/DL (ref 3.5–5.2)
ALP SERPL-CCNC: 140 U/L (ref 35–104)
ALT SERPL-CCNC: 16 U/L (ref 0–32)
ANION GAP SERPL CALCULATED.3IONS-SCNC: 12 MMOL/L (ref 7–16)
AST SERPL-CCNC: 16 U/L (ref 0–31)
BASOPHILS # BLD: 0.03 K/UL (ref 0–0.2)
BASOPHILS NFR BLD: 1 % (ref 0–2)
BILIRUB SERPL-MCNC: 0.4 MG/DL (ref 0–1.2)
BNP SERPL-MCNC: 5274 PG/ML (ref 0–125)
BUN SERPL-MCNC: 33 MG/DL (ref 6–23)
CALCIUM SERPL-MCNC: 9.4 MG/DL (ref 8.6–10.2)
CHLORIDE SERPL-SCNC: 96 MMOL/L (ref 98–107)
CO2 SERPL-SCNC: 35 MMOL/L (ref 22–29)
CREAT SERPL-MCNC: 1.2 MG/DL (ref 0.5–1)
EOSINOPHIL # BLD: 0.16 K/UL (ref 0.05–0.5)
EOSINOPHILS RELATIVE PERCENT: 4 % (ref 0–6)
ERYTHROCYTE [DISTWIDTH] IN BLOOD BY AUTOMATED COUNT: 16.6 % (ref 11.5–15)
GFR, ESTIMATED: 48 ML/MIN/1.73M2
GLUCOSE BLD-MCNC: 105 MG/DL (ref 74–99)
GLUCOSE BLD-MCNC: 118 MG/DL (ref 74–99)
GLUCOSE BLD-MCNC: 136 MG/DL (ref 74–99)
GLUCOSE BLD-MCNC: 148 MG/DL (ref 74–99)
GLUCOSE SERPL-MCNC: 91 MG/DL (ref 74–99)
HCT VFR BLD AUTO: 30.1 % (ref 34–48)
HGB BLD-MCNC: 9.1 G/DL (ref 11.5–15.5)
IMM GRANULOCYTES # BLD AUTO: 0.03 K/UL (ref 0–0.58)
IMM GRANULOCYTES NFR BLD: 1 % (ref 0–5)
LYMPHOCYTES NFR BLD: 0.62 K/UL (ref 1.5–4)
LYMPHOCYTES RELATIVE PERCENT: 14 % (ref 20–42)
MCH RBC QN AUTO: 26 PG (ref 26–35)
MCHC RBC AUTO-ENTMCNC: 30.2 G/DL (ref 32–34.5)
MCV RBC AUTO: 86 FL (ref 80–99.9)
MONOCYTES NFR BLD: 0.53 K/UL (ref 0.1–0.95)
MONOCYTES NFR BLD: 12 % (ref 2–12)
NEUTROPHILS NFR BLD: 69 % (ref 43–80)
NEUTS SEG NFR BLD: 3.07 K/UL (ref 1.8–7.3)
PLATELET # BLD AUTO: 273 K/UL (ref 130–450)
PMV BLD AUTO: 10.5 FL (ref 7–12)
POTASSIUM SERPL-SCNC: 3.7 MMOL/L (ref 3.5–5)
PROT SERPL-MCNC: 6.3 G/DL (ref 6.4–8.3)
RBC # BLD AUTO: 3.5 M/UL (ref 3.5–5.5)
SODIUM SERPL-SCNC: 143 MMOL/L (ref 132–146)
WBC OTHER # BLD: 4.4 K/UL (ref 4.5–11.5)

## 2024-05-22 PROCEDURE — 94660 CPAP INITIATION&MGMT: CPT

## 2024-05-22 PROCEDURE — 80053 COMPREHEN METABOLIC PANEL: CPT

## 2024-05-22 PROCEDURE — 6370000000 HC RX 637 (ALT 250 FOR IP)

## 2024-05-22 PROCEDURE — 71045 X-RAY EXAM CHEST 1 VIEW: CPT

## 2024-05-22 PROCEDURE — 97530 THERAPEUTIC ACTIVITIES: CPT

## 2024-05-22 PROCEDURE — 2700000000 HC OXYGEN THERAPY PER DAY

## 2024-05-22 PROCEDURE — 6360000002 HC RX W HCPCS

## 2024-05-22 PROCEDURE — 6370000000 HC RX 637 (ALT 250 FOR IP): Performed by: INTERNAL MEDICINE

## 2024-05-22 PROCEDURE — 99232 SBSQ HOSP IP/OBS MODERATE 35: CPT | Performed by: FAMILY MEDICINE

## 2024-05-22 PROCEDURE — 97535 SELF CARE MNGMENT TRAINING: CPT

## 2024-05-22 PROCEDURE — 2580000003 HC RX 258

## 2024-05-22 PROCEDURE — 36415 COLL VENOUS BLD VENIPUNCTURE: CPT

## 2024-05-22 PROCEDURE — 83880 ASSAY OF NATRIURETIC PEPTIDE: CPT

## 2024-05-22 PROCEDURE — 94640 AIRWAY INHALATION TREATMENT: CPT

## 2024-05-22 PROCEDURE — 2140000000 HC CCU INTERMEDIATE R&B

## 2024-05-22 PROCEDURE — 85025 COMPLETE CBC W/AUTO DIFF WBC: CPT

## 2024-05-22 PROCEDURE — 82962 GLUCOSE BLOOD TEST: CPT

## 2024-05-22 RX ADMIN — BUDESONIDE INHALATION 500 MCG: 0.5 SUSPENSION RESPIRATORY (INHALATION) at 08:08

## 2024-05-22 RX ADMIN — HYDROCORTISONE 10 MG: 5 TABLET ORAL at 08:58

## 2024-05-22 RX ADMIN — SILDENAFIL 10 MG: 20 TABLET, FILM COATED ORAL at 21:13

## 2024-05-22 RX ADMIN — IPRATROPIUM BROMIDE AND ALBUTEROL SULFATE 1 DOSE: .5; 2.5 SOLUTION RESPIRATORY (INHALATION) at 12:00

## 2024-05-22 RX ADMIN — SODIUM CHLORIDE, PRESERVATIVE FREE 10 ML: 5 INJECTION INTRAVENOUS at 09:00

## 2024-05-22 RX ADMIN — ACETAMINOPHEN 650 MG: 325 TABLET ORAL at 13:34

## 2024-05-22 RX ADMIN — FERROUS SULFATE TAB 325 MG (65 MG ELEMENTAL FE) 325 MG: 325 (65 FE) TAB at 08:56

## 2024-05-22 RX ADMIN — LEVETIRACETAM 500 MG: 500 TABLET, FILM COATED ORAL at 08:57

## 2024-05-22 RX ADMIN — IPRATROPIUM BROMIDE AND ALBUTEROL SULFATE 1 DOSE: .5; 2.5 SOLUTION RESPIRATORY (INHALATION) at 08:08

## 2024-05-22 RX ADMIN — ARFORMOTEROL TARTRATE 15 MCG: 15 SOLUTION RESPIRATORY (INHALATION) at 08:08

## 2024-05-22 RX ADMIN — AMLODIPINE BESYLATE 5 MG: 5 TABLET ORAL at 08:56

## 2024-05-22 RX ADMIN — METOPROLOL SUCCINATE 50 MG: 50 TABLET, EXTENDED RELEASE ORAL at 21:11

## 2024-05-22 RX ADMIN — IPRATROPIUM BROMIDE AND ALBUTEROL SULFATE 1 DOSE: .5; 2.5 SOLUTION RESPIRATORY (INHALATION) at 16:13

## 2024-05-22 RX ADMIN — TETRAHYDROZOLINE HCL 0.05% 1 DROP: 0.05 SOLUTION/ DROPS INTRAOCULAR at 09:00

## 2024-05-22 RX ADMIN — Medication 1000 UNITS: at 08:56

## 2024-05-22 RX ADMIN — CYCLOBENZAPRINE 10 MG: 10 TABLET, FILM COATED ORAL at 05:53

## 2024-05-22 RX ADMIN — APIXABAN 5 MG: 5 TABLET, FILM COATED ORAL at 21:11

## 2024-05-22 RX ADMIN — BUDESONIDE INHALATION 500 MCG: 0.5 SUSPENSION RESPIRATORY (INHALATION) at 19:33

## 2024-05-22 RX ADMIN — ACETAMINOPHEN 650 MG: 325 TABLET ORAL at 05:53

## 2024-05-22 RX ADMIN — HYDROCORTISONE 5 MG: 5 TABLET ORAL at 21:13

## 2024-05-22 RX ADMIN — METOPROLOL SUCCINATE 50 MG: 50 TABLET, EXTENDED RELEASE ORAL at 08:57

## 2024-05-22 RX ADMIN — OMEGA-3-ACID ETHYL ESTERS 2 G: 1 CAPSULE, LIQUID FILLED ORAL at 08:58

## 2024-05-22 RX ADMIN — DESMOPRESSIN ACETATE 200 MCG: 0.1 TABLET ORAL at 08:57

## 2024-05-22 RX ADMIN — IPRATROPIUM BROMIDE AND ALBUTEROL SULFATE 1 DOSE: .5; 2.5 SOLUTION RESPIRATORY (INHALATION) at 19:33

## 2024-05-22 RX ADMIN — DESMOPRESSIN ACETATE 200 MCG: 0.1 TABLET ORAL at 21:11

## 2024-05-22 RX ADMIN — DULOXETINE HYDROCHLORIDE 30 MG: 30 CAPSULE, DELAYED RELEASE ORAL at 08:56

## 2024-05-22 RX ADMIN — SILDENAFIL 10 MG: 20 TABLET, FILM COATED ORAL at 13:38

## 2024-05-22 RX ADMIN — SODIUM CHLORIDE, PRESERVATIVE FREE 10 ML: 5 INJECTION INTRAVENOUS at 21:12

## 2024-05-22 RX ADMIN — DICLOFENAC SODIUM 2 G: 10 GEL TOPICAL at 09:01

## 2024-05-22 RX ADMIN — MULTIVITAMIN TABLET 1 TABLET: TABLET at 08:58

## 2024-05-22 RX ADMIN — FERROUS SULFATE TAB 325 MG (65 MG ELEMENTAL FE) 325 MG: 325 (65 FE) TAB at 21:11

## 2024-05-22 RX ADMIN — TETRAHYDROZOLINE HCL 0.05% 1 DROP: 0.05 SOLUTION/ DROPS INTRAOCULAR at 21:17

## 2024-05-22 RX ADMIN — CYCLOBENZAPRINE 10 MG: 10 TABLET, FILM COATED ORAL at 13:38

## 2024-05-22 RX ADMIN — SILDENAFIL 10 MG: 20 TABLET, FILM COATED ORAL at 08:59

## 2024-05-22 RX ADMIN — LEVETIRACETAM 500 MG: 500 TABLET, FILM COATED ORAL at 21:11

## 2024-05-22 RX ADMIN — DICLOFENAC SODIUM 2 G: 10 GEL TOPICAL at 21:16

## 2024-05-22 RX ADMIN — ACETAMINOPHEN 650 MG: 325 TABLET ORAL at 21:11

## 2024-05-22 RX ADMIN — TETRAHYDROZOLINE HCL 0.05% 1 DROP: 0.05 SOLUTION/ DROPS INTRAOCULAR at 13:35

## 2024-05-22 RX ADMIN — OMEGA-3-ACID ETHYL ESTERS 2 G: 1 CAPSULE, LIQUID FILLED ORAL at 21:13

## 2024-05-22 RX ADMIN — LEVOTHYROXINE SODIUM 50 MCG: 0.05 TABLET ORAL at 05:53

## 2024-05-22 RX ADMIN — ARFORMOTEROL TARTRATE 15 MCG: 15 SOLUTION RESPIRATORY (INHALATION) at 19:33

## 2024-05-22 RX ADMIN — DIGOXIN 62.5 MCG: 0.12 TABLET ORAL at 08:56

## 2024-05-22 RX ADMIN — FUROSEMIDE 40 MG: 40 TABLET ORAL at 08:57

## 2024-05-22 RX ADMIN — APIXABAN 5 MG: 5 TABLET, FILM COATED ORAL at 08:57

## 2024-05-22 ASSESSMENT — PAIN DESCRIPTION - LOCATION
LOCATION: SHOULDER

## 2024-05-22 ASSESSMENT — PAIN SCALES - GENERAL
PAINLEVEL_OUTOF10: 7
PAINLEVEL_OUTOF10: 6
PAINLEVEL_OUTOF10: 6
PAINLEVEL_OUTOF10: 8
PAINLEVEL_OUTOF10: 7

## 2024-05-22 ASSESSMENT — PAIN DESCRIPTION - DESCRIPTORS
DESCRIPTORS: ACHING;DISCOMFORT;SORE
DESCRIPTORS: ACHING;DISCOMFORT;SORE

## 2024-05-22 ASSESSMENT — PAIN - FUNCTIONAL ASSESSMENT
PAIN_FUNCTIONAL_ASSESSMENT: ACTIVITIES ARE NOT PREVENTED

## 2024-05-22 ASSESSMENT — PAIN SCALES - WONG BAKER
WONGBAKER_NUMERICALRESPONSE: NO HURT

## 2024-05-22 ASSESSMENT — PAIN DESCRIPTION - ORIENTATION
ORIENTATION: RIGHT;LEFT
ORIENTATION: RIGHT;LEFT
ORIENTATION: RIGHT
ORIENTATION: RIGHT;LEFT

## 2024-05-23 ENCOUNTER — HOSPITAL ENCOUNTER (OUTPATIENT)
Dept: OTHER | Age: 69
Setting detail: THERAPIES SERIES
Discharge: HOME | End: 2024-05-23

## 2024-05-23 LAB
ALBUMIN SERPL-MCNC: 3.4 G/DL (ref 3.5–5.2)
ALP SERPL-CCNC: 128 U/L (ref 35–104)
ALT SERPL-CCNC: 14 U/L (ref 0–32)
ANION GAP SERPL CALCULATED.3IONS-SCNC: 14 MMOL/L (ref 7–16)
AST SERPL-CCNC: 16 U/L (ref 0–31)
BASOPHILS # BLD: 0.04 K/UL (ref 0–0.2)
BASOPHILS NFR BLD: 1 % (ref 0–2)
BILIRUB SERPL-MCNC: 0.4 MG/DL (ref 0–1.2)
BNP SERPL-MCNC: 4938 PG/ML (ref 0–125)
BUN SERPL-MCNC: 33 MG/DL (ref 6–23)
CALCIUM SERPL-MCNC: 9.2 MG/DL (ref 8.6–10.2)
CHLORIDE SERPL-SCNC: 97 MMOL/L (ref 98–107)
CO2 SERPL-SCNC: 32 MMOL/L (ref 22–29)
CREAT SERPL-MCNC: 1.2 MG/DL (ref 0.5–1)
EOSINOPHIL # BLD: 0.08 K/UL (ref 0.05–0.5)
EOSINOPHILS RELATIVE PERCENT: 2 % (ref 0–6)
ERYTHROCYTE [DISTWIDTH] IN BLOOD BY AUTOMATED COUNT: 16.7 % (ref 11.5–15)
GFR, ESTIMATED: 50 ML/MIN/1.73M2
GLUCOSE BLD-MCNC: 117 MG/DL (ref 74–99)
GLUCOSE BLD-MCNC: 88 MG/DL (ref 74–99)
GLUCOSE SERPL-MCNC: 89 MG/DL (ref 74–99)
HCT VFR BLD AUTO: 30.9 % (ref 34–48)
HGB BLD-MCNC: 9.2 G/DL (ref 11.5–15.5)
LYMPHOCYTES NFR BLD: 0.16 K/UL (ref 1.5–4)
LYMPHOCYTES RELATIVE PERCENT: 4 % (ref 20–42)
MAGNESIUM SERPL-MCNC: 2.2 MG/DL (ref 1.6–2.6)
MCH RBC QN AUTO: 25.3 PG (ref 26–35)
MCHC RBC AUTO-ENTMCNC: 29.8 G/DL (ref 32–34.5)
MCV RBC AUTO: 84.9 FL (ref 80–99.9)
MONOCYTES NFR BLD: 0.33 K/UL (ref 0.1–0.95)
MONOCYTES NFR BLD: 7 % (ref 2–12)
NEUTROPHILS NFR BLD: 87 % (ref 43–80)
NEUTS SEG NFR BLD: 4.08 K/UL (ref 1.8–7.3)
PLATELET # BLD AUTO: 259 K/UL (ref 130–450)
PMV BLD AUTO: 10.3 FL (ref 7–12)
POTASSIUM SERPL-SCNC: 3.5 MMOL/L (ref 3.5–5)
PROT SERPL-MCNC: 6.1 G/DL (ref 6.4–8.3)
RBC # BLD AUTO: 3.64 M/UL (ref 3.5–5.5)
RBC # BLD: ABNORMAL 10*6/UL
SODIUM SERPL-SCNC: 143 MMOL/L (ref 132–146)
WBC OTHER # BLD: 4.7 K/UL (ref 4.5–11.5)

## 2024-05-23 PROCEDURE — 99232 SBSQ HOSP IP/OBS MODERATE 35: CPT | Performed by: FAMILY MEDICINE

## 2024-05-23 PROCEDURE — 6370000000 HC RX 637 (ALT 250 FOR IP)

## 2024-05-23 PROCEDURE — 83735 ASSAY OF MAGNESIUM: CPT

## 2024-05-23 PROCEDURE — 2580000003 HC RX 258

## 2024-05-23 PROCEDURE — 2700000000 HC OXYGEN THERAPY PER DAY

## 2024-05-23 PROCEDURE — 36415 COLL VENOUS BLD VENIPUNCTURE: CPT

## 2024-05-23 PROCEDURE — 82962 GLUCOSE BLOOD TEST: CPT

## 2024-05-23 PROCEDURE — 2140000000 HC CCU INTERMEDIATE R&B

## 2024-05-23 PROCEDURE — 94660 CPAP INITIATION&MGMT: CPT

## 2024-05-23 PROCEDURE — 80053 COMPREHEN METABOLIC PANEL: CPT

## 2024-05-23 PROCEDURE — 6370000000 HC RX 637 (ALT 250 FOR IP): Performed by: INTERNAL MEDICINE

## 2024-05-23 PROCEDURE — 6360000002 HC RX W HCPCS

## 2024-05-23 PROCEDURE — 83880 ASSAY OF NATRIURETIC PEPTIDE: CPT

## 2024-05-23 PROCEDURE — 94640 AIRWAY INHALATION TREATMENT: CPT

## 2024-05-23 PROCEDURE — 85025 COMPLETE CBC W/AUTO DIFF WBC: CPT

## 2024-05-23 RX ADMIN — BUDESONIDE INHALATION 500 MCG: 0.5 SUSPENSION RESPIRATORY (INHALATION) at 19:49

## 2024-05-23 RX ADMIN — SILDENAFIL 10 MG: 20 TABLET, FILM COATED ORAL at 14:45

## 2024-05-23 RX ADMIN — FUROSEMIDE 40 MG: 40 TABLET ORAL at 09:54

## 2024-05-23 RX ADMIN — OMEGA-3-ACID ETHYL ESTERS 2 G: 1 CAPSULE, LIQUID FILLED ORAL at 10:04

## 2024-05-23 RX ADMIN — AMLODIPINE BESYLATE 5 MG: 5 TABLET ORAL at 09:53

## 2024-05-23 RX ADMIN — APIXABAN 5 MG: 5 TABLET, FILM COATED ORAL at 20:40

## 2024-05-23 RX ADMIN — CYCLOBENZAPRINE 10 MG: 10 TABLET, FILM COATED ORAL at 01:03

## 2024-05-23 RX ADMIN — MULTIVITAMIN TABLET 1 TABLET: TABLET at 10:01

## 2024-05-23 RX ADMIN — FERROUS SULFATE TAB 325 MG (65 MG ELEMENTAL FE) 325 MG: 325 (65 FE) TAB at 09:54

## 2024-05-23 RX ADMIN — DULOXETINE HYDROCHLORIDE 30 MG: 30 CAPSULE, DELAYED RELEASE ORAL at 09:54

## 2024-05-23 RX ADMIN — SILDENAFIL 10 MG: 20 TABLET, FILM COATED ORAL at 10:05

## 2024-05-23 RX ADMIN — SODIUM CHLORIDE, PRESERVATIVE FREE 10 ML: 5 INJECTION INTRAVENOUS at 09:55

## 2024-05-23 RX ADMIN — SODIUM CHLORIDE, PRESERVATIVE FREE 10 ML: 5 INJECTION INTRAVENOUS at 20:40

## 2024-05-23 RX ADMIN — ACETAMINOPHEN 650 MG: 325 TABLET ORAL at 20:39

## 2024-05-23 RX ADMIN — ARFORMOTEROL TARTRATE 15 MCG: 15 SOLUTION RESPIRATORY (INHALATION) at 19:49

## 2024-05-23 RX ADMIN — LEVETIRACETAM 500 MG: 500 TABLET, FILM COATED ORAL at 20:40

## 2024-05-23 RX ADMIN — IPRATROPIUM BROMIDE AND ALBUTEROL SULFATE 1 DOSE: .5; 2.5 SOLUTION RESPIRATORY (INHALATION) at 19:49

## 2024-05-23 RX ADMIN — IPRATROPIUM BROMIDE AND ALBUTEROL SULFATE 1 DOSE: .5; 2.5 SOLUTION RESPIRATORY (INHALATION) at 07:47

## 2024-05-23 RX ADMIN — BUDESONIDE INHALATION 500 MCG: 0.5 SUSPENSION RESPIRATORY (INHALATION) at 07:47

## 2024-05-23 RX ADMIN — TETRAHYDROZOLINE HCL 0.05% 1 DROP: 0.05 SOLUTION/ DROPS INTRAOCULAR at 14:46

## 2024-05-23 RX ADMIN — OMEGA-3-ACID ETHYL ESTERS 2 G: 1 CAPSULE, LIQUID FILLED ORAL at 20:42

## 2024-05-23 RX ADMIN — TETRAHYDROZOLINE HCL 0.05% 1 DROP: 0.05 SOLUTION/ DROPS INTRAOCULAR at 12:19

## 2024-05-23 RX ADMIN — DIGOXIN 62.5 MCG: 0.12 TABLET ORAL at 09:54

## 2024-05-23 RX ADMIN — DESMOPRESSIN ACETATE 200 MCG: 0.1 TABLET ORAL at 10:01

## 2024-05-23 RX ADMIN — FERROUS SULFATE TAB 325 MG (65 MG ELEMENTAL FE) 325 MG: 325 (65 FE) TAB at 20:40

## 2024-05-23 RX ADMIN — TETRAHYDROZOLINE HCL 0.05% 1 DROP: 0.05 SOLUTION/ DROPS INTRAOCULAR at 20:42

## 2024-05-23 RX ADMIN — DESMOPRESSIN ACETATE 200 MCG: 0.1 TABLET ORAL at 20:42

## 2024-05-23 RX ADMIN — IPRATROPIUM BROMIDE AND ALBUTEROL SULFATE 1 DOSE: .5; 2.5 SOLUTION RESPIRATORY (INHALATION) at 15:59

## 2024-05-23 RX ADMIN — APIXABAN 5 MG: 5 TABLET, FILM COATED ORAL at 09:55

## 2024-05-23 RX ADMIN — IPRATROPIUM BROMIDE AND ALBUTEROL SULFATE 1 DOSE: .5; 2.5 SOLUTION RESPIRATORY (INHALATION) at 12:19

## 2024-05-23 RX ADMIN — HYDROCORTISONE 5 MG: 5 TABLET ORAL at 20:42

## 2024-05-23 RX ADMIN — SILDENAFIL 10 MG: 20 TABLET, FILM COATED ORAL at 20:42

## 2024-05-23 RX ADMIN — HYDROCORTISONE 10 MG: 5 TABLET ORAL at 10:01

## 2024-05-23 RX ADMIN — METOPROLOL SUCCINATE 50 MG: 50 TABLET, EXTENDED RELEASE ORAL at 20:40

## 2024-05-23 RX ADMIN — Medication 1000 UNITS: at 09:54

## 2024-05-23 RX ADMIN — ARFORMOTEROL TARTRATE 15 MCG: 15 SOLUTION RESPIRATORY (INHALATION) at 07:47

## 2024-05-23 RX ADMIN — ACETAMINOPHEN 650 MG: 325 TABLET ORAL at 14:45

## 2024-05-23 RX ADMIN — METOPROLOL SUCCINATE 50 MG: 50 TABLET, EXTENDED RELEASE ORAL at 09:54

## 2024-05-23 RX ADMIN — LEVETIRACETAM 500 MG: 500 TABLET, FILM COATED ORAL at 09:54

## 2024-05-23 ASSESSMENT — PAIN DESCRIPTION - ORIENTATION: ORIENTATION: MID

## 2024-05-23 ASSESSMENT — PAIN SCALES - GENERAL: PAINLEVEL_OUTOF10: 3

## 2024-05-23 ASSESSMENT — PAIN DESCRIPTION - DESCRIPTORS: DESCRIPTORS: DISCOMFORT

## 2024-05-23 ASSESSMENT — PAIN DESCRIPTION - LOCATION: LOCATION: HEAD

## 2024-05-24 LAB
ALBUMIN SERPL-MCNC: 3.9 G/DL (ref 3.5–5.2)
ALP SERPL-CCNC: 154 U/L (ref 35–104)
ALT SERPL-CCNC: 15 U/L (ref 0–32)
ANION GAP SERPL CALCULATED.3IONS-SCNC: 11 MMOL/L (ref 7–16)
AST SERPL-CCNC: 22 U/L (ref 0–31)
BASOPHILS # BLD: 0.15 K/UL (ref 0–0.2)
BASOPHILS NFR BLD: 3 % (ref 0–2)
BILIRUB SERPL-MCNC: 0.6 MG/DL (ref 0–1.2)
BNP SERPL-MCNC: 4876 PG/ML (ref 0–125)
BUN SERPL-MCNC: 30 MG/DL (ref 6–23)
CALCIUM SERPL-MCNC: 9.6 MG/DL (ref 8.6–10.2)
CHLORIDE SERPL-SCNC: 95 MMOL/L (ref 98–107)
CO2 SERPL-SCNC: 38 MMOL/L (ref 22–29)
CREAT SERPL-MCNC: 0.9 MG/DL (ref 0.5–1)
EOSINOPHIL # BLD: 0.05 K/UL (ref 0.05–0.5)
EOSINOPHILS RELATIVE PERCENT: 1 % (ref 0–6)
ERYTHROCYTE [DISTWIDTH] IN BLOOD BY AUTOMATED COUNT: 16.9 % (ref 11.5–15)
GFR, ESTIMATED: 67 ML/MIN/1.73M2
GLUCOSE BLD-MCNC: 102 MG/DL (ref 74–99)
GLUCOSE BLD-MCNC: 131 MG/DL (ref 74–99)
GLUCOSE BLD-MCNC: 90 MG/DL (ref 74–99)
GLUCOSE BLD-MCNC: 90 MG/DL (ref 74–99)
GLUCOSE SERPL-MCNC: 93 MG/DL (ref 74–99)
HCT VFR BLD AUTO: 36.8 % (ref 34–48)
HGB BLD-MCNC: 11 G/DL (ref 11.5–15.5)
LYMPHOCYTES NFR BLD: 0.39 K/UL (ref 1.5–4)
LYMPHOCYTES RELATIVE PERCENT: 7 % (ref 20–42)
MAGNESIUM SERPL-MCNC: 2.1 MG/DL (ref 1.6–2.6)
MCH RBC QN AUTO: 25.9 PG (ref 26–35)
MCHC RBC AUTO-ENTMCNC: 29.9 G/DL (ref 32–34.5)
MCV RBC AUTO: 86.6 FL (ref 80–99.9)
MONOCYTES NFR BLD: 0.24 K/UL (ref 0.1–0.95)
MONOCYTES NFR BLD: 4 % (ref 2–12)
NEUTROPHILS NFR BLD: 85 % (ref 43–80)
NEUTS SEG NFR BLD: 4.77 K/UL (ref 1.8–7.3)
PLATELET # BLD AUTO: 286 K/UL (ref 130–450)
PMV BLD AUTO: 10.3 FL (ref 7–12)
POTASSIUM SERPL-SCNC: 3.1 MMOL/L (ref 3.5–5)
PROT SERPL-MCNC: 7.4 G/DL (ref 6.4–8.3)
RBC # BLD AUTO: 4.25 M/UL (ref 3.5–5.5)
RBC # BLD: ABNORMAL 10*6/UL
SODIUM SERPL-SCNC: 144 MMOL/L (ref 132–146)
WBC OTHER # BLD: 5.6 K/UL (ref 4.5–11.5)

## 2024-05-24 PROCEDURE — 6370000000 HC RX 637 (ALT 250 FOR IP): Performed by: INTERNAL MEDICINE

## 2024-05-24 PROCEDURE — 6370000000 HC RX 637 (ALT 250 FOR IP)

## 2024-05-24 PROCEDURE — 94640 AIRWAY INHALATION TREATMENT: CPT

## 2024-05-24 PROCEDURE — 85025 COMPLETE CBC W/AUTO DIFF WBC: CPT

## 2024-05-24 PROCEDURE — 99232 SBSQ HOSP IP/OBS MODERATE 35: CPT

## 2024-05-24 PROCEDURE — 6360000002 HC RX W HCPCS

## 2024-05-24 PROCEDURE — 2580000003 HC RX 258

## 2024-05-24 PROCEDURE — 36415 COLL VENOUS BLD VENIPUNCTURE: CPT

## 2024-05-24 PROCEDURE — 82962 GLUCOSE BLOOD TEST: CPT

## 2024-05-24 PROCEDURE — 2700000000 HC OXYGEN THERAPY PER DAY

## 2024-05-24 PROCEDURE — 2140000000 HC CCU INTERMEDIATE R&B

## 2024-05-24 PROCEDURE — 94660 CPAP INITIATION&MGMT: CPT

## 2024-05-24 PROCEDURE — 83880 ASSAY OF NATRIURETIC PEPTIDE: CPT

## 2024-05-24 PROCEDURE — 80053 COMPREHEN METABOLIC PANEL: CPT

## 2024-05-24 PROCEDURE — 83735 ASSAY OF MAGNESIUM: CPT

## 2024-05-24 RX ORDER — DIPHENHYDRAMINE HCL 25 MG
25 TABLET ORAL ONCE
Status: COMPLETED | OUTPATIENT
Start: 2024-05-24 | End: 2024-05-24

## 2024-05-24 RX ORDER — POTASSIUM CHLORIDE 20 MEQ/1
40 TABLET, EXTENDED RELEASE ORAL ONCE
Status: COMPLETED | OUTPATIENT
Start: 2024-05-24 | End: 2024-05-24

## 2024-05-24 RX ORDER — DULOXETIN HYDROCHLORIDE 60 MG/1
60 CAPSULE, DELAYED RELEASE ORAL DAILY
Status: DISCONTINUED | OUTPATIENT
Start: 2024-05-25 | End: 2024-06-03 | Stop reason: HOSPADM

## 2024-05-24 RX ORDER — POTASSIUM CHLORIDE 20 MEQ/1
20 TABLET, EXTENDED RELEASE ORAL ONCE
Status: COMPLETED | OUTPATIENT
Start: 2024-05-24 | End: 2024-05-24

## 2024-05-24 RX ADMIN — FERROUS SULFATE TAB 325 MG (65 MG ELEMENTAL FE) 325 MG: 325 (65 FE) TAB at 21:05

## 2024-05-24 RX ADMIN — ACETAMINOPHEN 650 MG: 325 TABLET ORAL at 13:30

## 2024-05-24 RX ADMIN — SODIUM CHLORIDE, PRESERVATIVE FREE 10 ML: 5 INJECTION INTRAVENOUS at 10:53

## 2024-05-24 RX ADMIN — HYDROCORTISONE 10 MG: 5 TABLET ORAL at 10:54

## 2024-05-24 RX ADMIN — BUDESONIDE INHALATION 500 MCG: 0.5 SUSPENSION RESPIRATORY (INHALATION) at 07:26

## 2024-05-24 RX ADMIN — ACETAMINOPHEN 650 MG: 325 TABLET ORAL at 21:05

## 2024-05-24 RX ADMIN — DIGOXIN 62.5 MCG: 0.12 TABLET ORAL at 10:54

## 2024-05-24 RX ADMIN — SILDENAFIL 10 MG: 20 TABLET, FILM COATED ORAL at 21:04

## 2024-05-24 RX ADMIN — AMLODIPINE BESYLATE 5 MG: 5 TABLET ORAL at 10:54

## 2024-05-24 RX ADMIN — TETRAHYDROZOLINE HCL 0.05% 1 DROP: 0.05 SOLUTION/ DROPS INTRAOCULAR at 21:04

## 2024-05-24 RX ADMIN — METOPROLOL SUCCINATE 50 MG: 50 TABLET, EXTENDED RELEASE ORAL at 21:05

## 2024-05-24 RX ADMIN — LEVETIRACETAM 500 MG: 500 TABLET, FILM COATED ORAL at 21:05

## 2024-05-24 RX ADMIN — DULOXETINE HYDROCHLORIDE 30 MG: 30 CAPSULE, DELAYED RELEASE ORAL at 10:54

## 2024-05-24 RX ADMIN — IPRATROPIUM BROMIDE AND ALBUTEROL SULFATE 1 DOSE: .5; 2.5 SOLUTION RESPIRATORY (INHALATION) at 18:57

## 2024-05-24 RX ADMIN — DESMOPRESSIN ACETATE 200 MCG: 0.1 TABLET ORAL at 10:53

## 2024-05-24 RX ADMIN — TETRAHYDROZOLINE HCL 0.05% 1 DROP: 0.05 SOLUTION/ DROPS INTRAOCULAR at 10:52

## 2024-05-24 RX ADMIN — TETRAHYDROZOLINE HCL 0.05% 1 DROP: 0.05 SOLUTION/ DROPS INTRAOCULAR at 13:30

## 2024-05-24 RX ADMIN — CYCLOBENZAPRINE 10 MG: 10 TABLET, FILM COATED ORAL at 01:30

## 2024-05-24 RX ADMIN — APIXABAN 5 MG: 5 TABLET, FILM COATED ORAL at 21:05

## 2024-05-24 RX ADMIN — METOPROLOL SUCCINATE 50 MG: 50 TABLET, EXTENDED RELEASE ORAL at 10:54

## 2024-05-24 RX ADMIN — SILDENAFIL 10 MG: 20 TABLET, FILM COATED ORAL at 10:54

## 2024-05-24 RX ADMIN — FERROUS SULFATE TAB 325 MG (65 MG ELEMENTAL FE) 325 MG: 325 (65 FE) TAB at 10:53

## 2024-05-24 RX ADMIN — OMEGA-3-ACID ETHYL ESTERS 2 G: 1 CAPSULE, LIQUID FILLED ORAL at 10:53

## 2024-05-24 RX ADMIN — DIPHENHYDRAMINE HYDROCHLORIDE 25 MG: 25 TABLET ORAL at 23:48

## 2024-05-24 RX ADMIN — CYCLOBENZAPRINE 10 MG: 10 TABLET, FILM COATED ORAL at 21:05

## 2024-05-24 RX ADMIN — Medication 1000 UNITS: at 10:53

## 2024-05-24 RX ADMIN — ACETAMINOPHEN 650 MG: 325 TABLET ORAL at 06:01

## 2024-05-24 RX ADMIN — ARFORMOTEROL TARTRATE 15 MCG: 15 SOLUTION RESPIRATORY (INHALATION) at 07:26

## 2024-05-24 RX ADMIN — IPRATROPIUM BROMIDE AND ALBUTEROL SULFATE 1 DOSE: .5; 2.5 SOLUTION RESPIRATORY (INHALATION) at 16:03

## 2024-05-24 RX ADMIN — IPRATROPIUM BROMIDE AND ALBUTEROL SULFATE 1 DOSE: .5; 2.5 SOLUTION RESPIRATORY (INHALATION) at 11:31

## 2024-05-24 RX ADMIN — POTASSIUM CHLORIDE 40 MEQ: 1500 TABLET, EXTENDED RELEASE ORAL at 16:21

## 2024-05-24 RX ADMIN — MULTIVITAMIN TABLET 1 TABLET: TABLET at 10:53

## 2024-05-24 RX ADMIN — OMEGA-3-ACID ETHYL ESTERS 2 G: 1 CAPSULE, LIQUID FILLED ORAL at 21:04

## 2024-05-24 RX ADMIN — IPRATROPIUM BROMIDE AND ALBUTEROL SULFATE 1 DOSE: .5; 2.5 SOLUTION RESPIRATORY (INHALATION) at 07:26

## 2024-05-24 RX ADMIN — SODIUM CHLORIDE, PRESERVATIVE FREE 10 ML: 5 INJECTION INTRAVENOUS at 21:03

## 2024-05-24 RX ADMIN — BUDESONIDE INHALATION 500 MCG: 0.5 SUSPENSION RESPIRATORY (INHALATION) at 18:57

## 2024-05-24 RX ADMIN — LEVETIRACETAM 500 MG: 500 TABLET, FILM COATED ORAL at 10:54

## 2024-05-24 RX ADMIN — DESMOPRESSIN ACETATE 200 MCG: 0.1 TABLET ORAL at 21:04

## 2024-05-24 RX ADMIN — DICLOFENAC SODIUM 2 G: 10 GEL TOPICAL at 21:07

## 2024-05-24 RX ADMIN — ARFORMOTEROL TARTRATE 15 MCG: 15 SOLUTION RESPIRATORY (INHALATION) at 18:57

## 2024-05-24 RX ADMIN — SILDENAFIL 10 MG: 20 TABLET, FILM COATED ORAL at 13:30

## 2024-05-24 RX ADMIN — FUROSEMIDE 40 MG: 40 TABLET ORAL at 10:54

## 2024-05-24 RX ADMIN — LEVOTHYROXINE SODIUM 50 MCG: 0.05 TABLET ORAL at 06:01

## 2024-05-24 RX ADMIN — HYDROCORTISONE 5 MG: 5 TABLET ORAL at 21:04

## 2024-05-24 RX ADMIN — POTASSIUM CHLORIDE 20 MEQ: 1500 TABLET, EXTENDED RELEASE ORAL at 16:23

## 2024-05-24 RX ADMIN — APIXABAN 5 MG: 5 TABLET, FILM COATED ORAL at 10:53

## 2024-05-24 ASSESSMENT — PAIN DESCRIPTION - LOCATION: LOCATION: ARM

## 2024-05-24 ASSESSMENT — PAIN SCALES - GENERAL
PAINLEVEL_OUTOF10: 0
PAINLEVEL_OUTOF10: 0
PAINLEVEL_OUTOF10: 6

## 2024-05-24 ASSESSMENT — PAIN DESCRIPTION - ORIENTATION: ORIENTATION: OTHER (COMMENT)

## 2024-05-24 ASSESSMENT — PAIN DESCRIPTION - DESCRIPTORS: DESCRIPTORS: DISCOMFORT

## 2024-05-25 LAB
ALBUMIN SERPL-MCNC: 3.6 G/DL (ref 3.5–5.2)
ALP SERPL-CCNC: 132 U/L (ref 35–104)
ALT SERPL-CCNC: 14 U/L (ref 0–32)
ANION GAP SERPL CALCULATED.3IONS-SCNC: 13 MMOL/L (ref 7–16)
AST SERPL-CCNC: 18 U/L (ref 0–31)
BASOPHILS # BLD: 0.02 K/UL (ref 0–0.2)
BASOPHILS NFR BLD: 0 % (ref 0–2)
BILIRUB SERPL-MCNC: 0.3 MG/DL (ref 0–1.2)
BNP SERPL-MCNC: 4891 PG/ML (ref 0–125)
BUN SERPL-MCNC: 34 MG/DL (ref 6–23)
CALCIUM SERPL-MCNC: 9.2 MG/DL (ref 8.6–10.2)
CHLORIDE SERPL-SCNC: 97 MMOL/L (ref 98–107)
CO2 SERPL-SCNC: 32 MMOL/L (ref 22–29)
CREAT SERPL-MCNC: 1.1 MG/DL (ref 0.5–1)
EOSINOPHIL # BLD: 0.17 K/UL (ref 0.05–0.5)
EOSINOPHILS RELATIVE PERCENT: 3 % (ref 0–6)
ERYTHROCYTE [DISTWIDTH] IN BLOOD BY AUTOMATED COUNT: 17.2 % (ref 11.5–15)
GFR, ESTIMATED: 54 ML/MIN/1.73M2
GLUCOSE BLD-MCNC: 113 MG/DL (ref 74–99)
GLUCOSE BLD-MCNC: 115 MG/DL (ref 74–99)
GLUCOSE BLD-MCNC: 129 MG/DL (ref 74–99)
GLUCOSE SERPL-MCNC: 104 MG/DL (ref 74–99)
HCT VFR BLD AUTO: 33.1 % (ref 34–48)
HGB BLD-MCNC: 9.8 G/DL (ref 11.5–15.5)
IMM GRANULOCYTES # BLD AUTO: <0.03 K/UL (ref 0–0.58)
IMM GRANULOCYTES NFR BLD: 0 % (ref 0–5)
LYMPHOCYTES NFR BLD: 0.55 K/UL (ref 1.5–4)
LYMPHOCYTES RELATIVE PERCENT: 11 % (ref 20–42)
MCH RBC QN AUTO: 25.5 PG (ref 26–35)
MCHC RBC AUTO-ENTMCNC: 29.6 G/DL (ref 32–34.5)
MCV RBC AUTO: 86.2 FL (ref 80–99.9)
MONOCYTES NFR BLD: 0.44 K/UL (ref 0.1–0.95)
MONOCYTES NFR BLD: 9 % (ref 2–12)
NEUTROPHILS NFR BLD: 76 % (ref 43–80)
NEUTS SEG NFR BLD: 3.88 K/UL (ref 1.8–7.3)
PLATELET # BLD AUTO: 262 K/UL (ref 130–450)
PMV BLD AUTO: 10.4 FL (ref 7–12)
POTASSIUM SERPL-SCNC: 4.6 MMOL/L (ref 3.5–5)
PROT SERPL-MCNC: 6.7 G/DL (ref 6.4–8.3)
RBC # BLD AUTO: 3.84 M/UL (ref 3.5–5.5)
RBC # BLD: ABNORMAL 10*6/UL
SODIUM SERPL-SCNC: 142 MMOL/L (ref 132–146)
WBC OTHER # BLD: 5.1 K/UL (ref 4.5–11.5)

## 2024-05-25 PROCEDURE — 2140000000 HC CCU INTERMEDIATE R&B

## 2024-05-25 PROCEDURE — 6370000000 HC RX 637 (ALT 250 FOR IP)

## 2024-05-25 PROCEDURE — 85025 COMPLETE CBC W/AUTO DIFF WBC: CPT

## 2024-05-25 PROCEDURE — 82962 GLUCOSE BLOOD TEST: CPT

## 2024-05-25 PROCEDURE — 2700000000 HC OXYGEN THERAPY PER DAY

## 2024-05-25 PROCEDURE — 94640 AIRWAY INHALATION TREATMENT: CPT

## 2024-05-25 PROCEDURE — 6360000002 HC RX W HCPCS

## 2024-05-25 PROCEDURE — 36415 COLL VENOUS BLD VENIPUNCTURE: CPT

## 2024-05-25 PROCEDURE — 83880 ASSAY OF NATRIURETIC PEPTIDE: CPT

## 2024-05-25 PROCEDURE — 2580000003 HC RX 258

## 2024-05-25 PROCEDURE — 80053 COMPREHEN METABOLIC PANEL: CPT

## 2024-05-25 PROCEDURE — 6370000000 HC RX 637 (ALT 250 FOR IP): Performed by: INTERNAL MEDICINE

## 2024-05-25 PROCEDURE — 94660 CPAP INITIATION&MGMT: CPT

## 2024-05-25 RX ADMIN — ACETAMINOPHEN 650 MG: 325 TABLET ORAL at 15:32

## 2024-05-25 RX ADMIN — HYDROCORTISONE 10 MG: 5 TABLET ORAL at 10:18

## 2024-05-25 RX ADMIN — MULTIVITAMIN TABLET 1 TABLET: TABLET at 10:19

## 2024-05-25 RX ADMIN — IPRATROPIUM BROMIDE AND ALBUTEROL SULFATE 1 DOSE: .5; 2.5 SOLUTION RESPIRATORY (INHALATION) at 15:55

## 2024-05-25 RX ADMIN — METOPROLOL SUCCINATE 50 MG: 50 TABLET, EXTENDED RELEASE ORAL at 23:07

## 2024-05-25 RX ADMIN — IPRATROPIUM BROMIDE AND ALBUTEROL SULFATE 1 DOSE: .5; 2.5 SOLUTION RESPIRATORY (INHALATION) at 19:56

## 2024-05-25 RX ADMIN — ARFORMOTEROL TARTRATE 15 MCG: 15 SOLUTION RESPIRATORY (INHALATION) at 07:52

## 2024-05-25 RX ADMIN — DULOXETINE HYDROCHLORIDE 60 MG: 60 CAPSULE, DELAYED RELEASE ORAL at 10:14

## 2024-05-25 RX ADMIN — SILDENAFIL 10 MG: 20 TABLET, FILM COATED ORAL at 15:31

## 2024-05-25 RX ADMIN — SODIUM CHLORIDE, PRESERVATIVE FREE 10 ML: 5 INJECTION INTRAVENOUS at 23:19

## 2024-05-25 RX ADMIN — SODIUM CHLORIDE, PRESERVATIVE FREE 10 ML: 5 INJECTION INTRAVENOUS at 10:16

## 2024-05-25 RX ADMIN — DICLOFENAC SODIUM 2 G: 10 GEL TOPICAL at 23:11

## 2024-05-25 RX ADMIN — DESMOPRESSIN ACETATE 200 MCG: 0.1 TABLET ORAL at 23:04

## 2024-05-25 RX ADMIN — BUDESONIDE INHALATION 500 MCG: 0.5 SUSPENSION RESPIRATORY (INHALATION) at 07:52

## 2024-05-25 RX ADMIN — APIXABAN 5 MG: 5 TABLET, FILM COATED ORAL at 10:15

## 2024-05-25 RX ADMIN — TETRAHYDROZOLINE HCL 0.05% 1 DROP: 0.05 SOLUTION/ DROPS INTRAOCULAR at 15:33

## 2024-05-25 RX ADMIN — CYCLOBENZAPRINE 10 MG: 10 TABLET, FILM COATED ORAL at 10:28

## 2024-05-25 RX ADMIN — HYDROCORTISONE 5 MG: 5 TABLET ORAL at 23:06

## 2024-05-25 RX ADMIN — FUROSEMIDE 40 MG: 40 TABLET ORAL at 10:15

## 2024-05-25 RX ADMIN — ARFORMOTEROL TARTRATE 15 MCG: 15 SOLUTION RESPIRATORY (INHALATION) at 19:56

## 2024-05-25 RX ADMIN — SILDENAFIL 10 MG: 20 TABLET, FILM COATED ORAL at 23:06

## 2024-05-25 RX ADMIN — LEVETIRACETAM 500 MG: 500 TABLET, FILM COATED ORAL at 23:06

## 2024-05-25 RX ADMIN — OMEGA-3-ACID ETHYL ESTERS 2 G: 1 CAPSULE, LIQUID FILLED ORAL at 10:18

## 2024-05-25 RX ADMIN — ACETAMINOPHEN 650 MG: 325 TABLET ORAL at 23:05

## 2024-05-25 RX ADMIN — ACETAMINOPHEN 650 MG: 325 TABLET ORAL at 05:19

## 2024-05-25 RX ADMIN — LEVETIRACETAM 500 MG: 500 TABLET, FILM COATED ORAL at 10:15

## 2024-05-25 RX ADMIN — BUDESONIDE INHALATION 500 MCG: 0.5 SUSPENSION RESPIRATORY (INHALATION) at 19:56

## 2024-05-25 RX ADMIN — DESMOPRESSIN ACETATE 200 MCG: 0.1 TABLET ORAL at 10:17

## 2024-05-25 RX ADMIN — CYCLOBENZAPRINE 10 MG: 10 TABLET, FILM COATED ORAL at 18:37

## 2024-05-25 RX ADMIN — FERROUS SULFATE TAB 325 MG (65 MG ELEMENTAL FE) 325 MG: 325 (65 FE) TAB at 23:06

## 2024-05-25 RX ADMIN — APIXABAN 5 MG: 5 TABLET, FILM COATED ORAL at 23:06

## 2024-05-25 RX ADMIN — TETRAHYDROZOLINE HCL 0.05% 1 DROP: 0.05 SOLUTION/ DROPS INTRAOCULAR at 23:05

## 2024-05-25 RX ADMIN — METOPROLOL SUCCINATE 50 MG: 50 TABLET, EXTENDED RELEASE ORAL at 10:15

## 2024-05-25 RX ADMIN — IPRATROPIUM BROMIDE AND ALBUTEROL SULFATE 1 DOSE: .5; 2.5 SOLUTION RESPIRATORY (INHALATION) at 07:52

## 2024-05-25 RX ADMIN — LEVOTHYROXINE SODIUM 50 MCG: 0.05 TABLET ORAL at 05:20

## 2024-05-25 RX ADMIN — TETRAHYDROZOLINE HCL 0.05% 1 DROP: 0.05 SOLUTION/ DROPS INTRAOCULAR at 10:21

## 2024-05-25 RX ADMIN — DIGOXIN 62.5 MCG: 0.12 TABLET ORAL at 10:15

## 2024-05-25 RX ADMIN — FERROUS SULFATE TAB 325 MG (65 MG ELEMENTAL FE) 325 MG: 325 (65 FE) TAB at 10:14

## 2024-05-25 RX ADMIN — SILDENAFIL 10 MG: 20 TABLET, FILM COATED ORAL at 10:19

## 2024-05-25 RX ADMIN — OMEGA-3-ACID ETHYL ESTERS 2 G: 1 CAPSULE, LIQUID FILLED ORAL at 23:06

## 2024-05-25 RX ADMIN — AMLODIPINE BESYLATE 5 MG: 5 TABLET ORAL at 10:15

## 2024-05-25 RX ADMIN — IPRATROPIUM BROMIDE AND ALBUTEROL SULFATE 1 DOSE: .5; 2.5 SOLUTION RESPIRATORY (INHALATION) at 12:10

## 2024-05-25 RX ADMIN — Medication 1000 UNITS: at 10:15

## 2024-05-25 ASSESSMENT — PAIN SCALES - GENERAL
PAINLEVEL_OUTOF10: 0
PAINLEVEL_OUTOF10: 4
PAINLEVEL_OUTOF10: 9
PAINLEVEL_OUTOF10: 9
PAINLEVEL_OUTOF10: 2
PAINLEVEL_OUTOF10: 5
PAINLEVEL_OUTOF10: 10

## 2024-05-25 ASSESSMENT — PAIN DESCRIPTION - LOCATION
LOCATION: SHOULDER
LOCATION: ARM;SHOULDER
LOCATION: SHOULDER
LOCATION: BACK
LOCATION: SHOULDER

## 2024-05-25 ASSESSMENT — PAIN DESCRIPTION - ORIENTATION
ORIENTATION: RIGHT

## 2024-05-25 ASSESSMENT — PAIN DESCRIPTION - DESCRIPTORS
DESCRIPTORS: ACHING;DISCOMFORT;SORE
DESCRIPTORS: ACHING
DESCRIPTORS: ACHING;DISCOMFORT;SORE
DESCRIPTORS: ACHING;DISCOMFORT;SORE
DESCRIPTORS: ACHING

## 2024-05-25 ASSESSMENT — PAIN DESCRIPTION - ONSET
ONSET: ON-GOING
ONSET: ON-GOING

## 2024-05-25 ASSESSMENT — PAIN DESCRIPTION - FREQUENCY
FREQUENCY: INTERMITTENT
FREQUENCY: INTERMITTENT

## 2024-05-25 ASSESSMENT — PAIN DESCRIPTION - PAIN TYPE
TYPE: ACUTE PAIN
TYPE: ACUTE PAIN

## 2024-05-25 ASSESSMENT — PAIN - FUNCTIONAL ASSESSMENT
PAIN_FUNCTIONAL_ASSESSMENT: ACTIVITIES ARE NOT PREVENTED

## 2024-05-26 ENCOUNTER — APPOINTMENT (OUTPATIENT)
Dept: GENERAL RADIOLOGY | Age: 69
DRG: 208 | End: 2024-05-26
Payer: MEDICARE

## 2024-05-26 LAB
ALBUMIN SERPL-MCNC: 3.6 G/DL (ref 3.5–5.2)
ALP SERPL-CCNC: 127 U/L (ref 35–104)
ALT SERPL-CCNC: 13 U/L (ref 0–32)
ANION GAP SERPL CALCULATED.3IONS-SCNC: 15 MMOL/L (ref 7–16)
AST SERPL-CCNC: 19 U/L (ref 0–31)
BASOPHILS # BLD: 0.03 K/UL (ref 0–0.2)
BASOPHILS NFR BLD: 1 % (ref 0–2)
BILIRUB SERPL-MCNC: 0.4 MG/DL (ref 0–1.2)
BNP SERPL-MCNC: 4608 PG/ML (ref 0–125)
BUN SERPL-MCNC: 37 MG/DL (ref 6–23)
CALCIUM SERPL-MCNC: 9.1 MG/DL (ref 8.6–10.2)
CHLORIDE SERPL-SCNC: 98 MMOL/L (ref 98–107)
CO2 SERPL-SCNC: 30 MMOL/L (ref 22–29)
CREAT SERPL-MCNC: 1.2 MG/DL (ref 0.5–1)
EOSINOPHIL # BLD: 0.14 K/UL (ref 0.05–0.5)
EOSINOPHILS RELATIVE PERCENT: 2 % (ref 0–6)
ERYTHROCYTE [DISTWIDTH] IN BLOOD BY AUTOMATED COUNT: 17.2 % (ref 11.5–15)
GFR, ESTIMATED: 50 ML/MIN/1.73M2
GLUCOSE BLD-MCNC: 123 MG/DL (ref 74–99)
GLUCOSE BLD-MCNC: 127 MG/DL (ref 74–99)
GLUCOSE BLD-MCNC: 133 MG/DL (ref 74–99)
GLUCOSE BLD-MCNC: 95 MG/DL (ref 74–99)
GLUCOSE SERPL-MCNC: 87 MG/DL (ref 74–99)
HCT VFR BLD AUTO: 32.2 % (ref 34–48)
HGB BLD-MCNC: 9.5 G/DL (ref 11.5–15.5)
IMM GRANULOCYTES # BLD AUTO: 0.04 K/UL (ref 0–0.58)
IMM GRANULOCYTES NFR BLD: 1 % (ref 0–5)
LYMPHOCYTES NFR BLD: 0.55 K/UL (ref 1.5–4)
LYMPHOCYTES RELATIVE PERCENT: 9 % (ref 20–42)
MCH RBC QN AUTO: 25.3 PG (ref 26–35)
MCHC RBC AUTO-ENTMCNC: 29.5 G/DL (ref 32–34.5)
MCV RBC AUTO: 85.6 FL (ref 80–99.9)
MONOCYTES NFR BLD: 0.53 K/UL (ref 0.1–0.95)
MONOCYTES NFR BLD: 9 % (ref 2–12)
NEUTROPHILS NFR BLD: 78 % (ref 43–80)
NEUTS SEG NFR BLD: 4.56 K/UL (ref 1.8–7.3)
PLATELET # BLD AUTO: 249 K/UL (ref 130–450)
PMV BLD AUTO: 11 FL (ref 7–12)
POTASSIUM SERPL-SCNC: 4 MMOL/L (ref 3.5–5)
PROT SERPL-MCNC: 6.5 G/DL (ref 6.4–8.3)
RBC # BLD AUTO: 3.76 M/UL (ref 3.5–5.5)
RBC # BLD: ABNORMAL 10*6/UL
SODIUM SERPL-SCNC: 143 MMOL/L (ref 132–146)
WBC OTHER # BLD: 5.9 K/UL (ref 4.5–11.5)

## 2024-05-26 PROCEDURE — 2700000000 HC OXYGEN THERAPY PER DAY

## 2024-05-26 PROCEDURE — 6370000000 HC RX 637 (ALT 250 FOR IP)

## 2024-05-26 PROCEDURE — 73030 X-RAY EXAM OF SHOULDER: CPT

## 2024-05-26 PROCEDURE — 94660 CPAP INITIATION&MGMT: CPT

## 2024-05-26 PROCEDURE — 36415 COLL VENOUS BLD VENIPUNCTURE: CPT

## 2024-05-26 PROCEDURE — 6360000002 HC RX W HCPCS

## 2024-05-26 PROCEDURE — 80053 COMPREHEN METABOLIC PANEL: CPT

## 2024-05-26 PROCEDURE — 2580000003 HC RX 258

## 2024-05-26 PROCEDURE — 6370000000 HC RX 637 (ALT 250 FOR IP): Performed by: INTERNAL MEDICINE

## 2024-05-26 PROCEDURE — 83880 ASSAY OF NATRIURETIC PEPTIDE: CPT

## 2024-05-26 PROCEDURE — 85025 COMPLETE CBC W/AUTO DIFF WBC: CPT

## 2024-05-26 PROCEDURE — 82962 GLUCOSE BLOOD TEST: CPT

## 2024-05-26 PROCEDURE — 2140000000 HC CCU INTERMEDIATE R&B

## 2024-05-26 PROCEDURE — 94640 AIRWAY INHALATION TREATMENT: CPT

## 2024-05-26 PROCEDURE — 71046 X-RAY EXAM CHEST 2 VIEWS: CPT

## 2024-05-26 RX ADMIN — ACETAMINOPHEN 650 MG: 325 TABLET ORAL at 13:48

## 2024-05-26 RX ADMIN — ACETAMINOPHEN 650 MG: 325 TABLET ORAL at 21:45

## 2024-05-26 RX ADMIN — SILDENAFIL 10 MG: 20 TABLET, FILM COATED ORAL at 16:46

## 2024-05-26 RX ADMIN — FUROSEMIDE 40 MG: 40 TABLET ORAL at 11:58

## 2024-05-26 RX ADMIN — APIXABAN 5 MG: 5 TABLET, FILM COATED ORAL at 12:00

## 2024-05-26 RX ADMIN — OMEGA-3-ACID ETHYL ESTERS 2 G: 1 CAPSULE, LIQUID FILLED ORAL at 12:02

## 2024-05-26 RX ADMIN — IPRATROPIUM BROMIDE AND ALBUTEROL SULFATE 1 DOSE: .5; 2.5 SOLUTION RESPIRATORY (INHALATION) at 19:48

## 2024-05-26 RX ADMIN — SODIUM CHLORIDE, PRESERVATIVE FREE 10 ML: 5 INJECTION INTRAVENOUS at 11:54

## 2024-05-26 RX ADMIN — DICLOFENAC SODIUM 2 G: 10 GEL TOPICAL at 21:46

## 2024-05-26 RX ADMIN — TETRAHYDROZOLINE HCL 0.05% 1 DROP: 0.05 SOLUTION/ DROPS INTRAOCULAR at 16:52

## 2024-05-26 RX ADMIN — OMEGA-3-ACID ETHYL ESTERS 2 G: 1 CAPSULE, LIQUID FILLED ORAL at 21:44

## 2024-05-26 RX ADMIN — LEVETIRACETAM 500 MG: 500 TABLET, FILM COATED ORAL at 12:01

## 2024-05-26 RX ADMIN — LEVOTHYROXINE SODIUM 50 MCG: 0.05 TABLET ORAL at 06:46

## 2024-05-26 RX ADMIN — SILDENAFIL 10 MG: 20 TABLET, FILM COATED ORAL at 12:09

## 2024-05-26 RX ADMIN — DIGOXIN 62.5 MCG: 0.12 TABLET ORAL at 11:59

## 2024-05-26 RX ADMIN — FERROUS SULFATE TAB 325 MG (65 MG ELEMENTAL FE) 325 MG: 325 (65 FE) TAB at 21:45

## 2024-05-26 RX ADMIN — SILDENAFIL 10 MG: 20 TABLET, FILM COATED ORAL at 21:45

## 2024-05-26 RX ADMIN — ACETAMINOPHEN 650 MG: 325 TABLET ORAL at 06:46

## 2024-05-26 RX ADMIN — Medication 1000 UNITS: at 12:00

## 2024-05-26 RX ADMIN — FERROUS SULFATE TAB 325 MG (65 MG ELEMENTAL FE) 325 MG: 325 (65 FE) TAB at 12:00

## 2024-05-26 RX ADMIN — ARFORMOTEROL TARTRATE 15 MCG: 15 SOLUTION RESPIRATORY (INHALATION) at 08:15

## 2024-05-26 RX ADMIN — GUAIFENESIN 400 MG: 400 TABLET ORAL at 12:18

## 2024-05-26 RX ADMIN — GUAIFENESIN 400 MG: 400 TABLET ORAL at 21:52

## 2024-05-26 RX ADMIN — ARFORMOTEROL TARTRATE 15 MCG: 15 SOLUTION RESPIRATORY (INHALATION) at 19:48

## 2024-05-26 RX ADMIN — METOPROLOL SUCCINATE 50 MG: 50 TABLET, EXTENDED RELEASE ORAL at 21:45

## 2024-05-26 RX ADMIN — HYDROCORTISONE 10 MG: 5 TABLET ORAL at 12:01

## 2024-05-26 RX ADMIN — DULOXETINE HYDROCHLORIDE 60 MG: 60 CAPSULE, DELAYED RELEASE ORAL at 12:01

## 2024-05-26 RX ADMIN — TETRAHYDROZOLINE HCL 0.05% 1 DROP: 0.05 SOLUTION/ DROPS INTRAOCULAR at 11:50

## 2024-05-26 RX ADMIN — MULTIVITAMIN TABLET 1 TABLET: TABLET at 12:03

## 2024-05-26 RX ADMIN — LEVETIRACETAM 500 MG: 500 TABLET, FILM COATED ORAL at 21:44

## 2024-05-26 RX ADMIN — CYCLOBENZAPRINE 10 MG: 10 TABLET, FILM COATED ORAL at 12:17

## 2024-05-26 RX ADMIN — DICLOFENAC SODIUM 2 G: 10 GEL TOPICAL at 12:11

## 2024-05-26 RX ADMIN — SODIUM CHLORIDE, PRESERVATIVE FREE 10 ML: 5 INJECTION INTRAVENOUS at 21:44

## 2024-05-26 RX ADMIN — IPRATROPIUM BROMIDE AND ALBUTEROL SULFATE 1 DOSE: .5; 2.5 SOLUTION RESPIRATORY (INHALATION) at 11:41

## 2024-05-26 RX ADMIN — BUDESONIDE INHALATION 500 MCG: 0.5 SUSPENSION RESPIRATORY (INHALATION) at 08:15

## 2024-05-26 RX ADMIN — DESMOPRESSIN ACETATE 200 MCG: 0.1 TABLET ORAL at 12:04

## 2024-05-26 RX ADMIN — APIXABAN 5 MG: 5 TABLET, FILM COATED ORAL at 21:46

## 2024-05-26 RX ADMIN — DESMOPRESSIN ACETATE 200 MCG: 0.1 TABLET ORAL at 21:46

## 2024-05-26 RX ADMIN — AMLODIPINE BESYLATE 5 MG: 5 TABLET ORAL at 12:00

## 2024-05-26 RX ADMIN — IPRATROPIUM BROMIDE AND ALBUTEROL SULFATE 1 DOSE: .5; 2.5 SOLUTION RESPIRATORY (INHALATION) at 15:47

## 2024-05-26 RX ADMIN — HYDROCORTISONE 5 MG: 5 TABLET ORAL at 21:46

## 2024-05-26 RX ADMIN — TETRAHYDROZOLINE HCL 0.05% 1 DROP: 0.05 SOLUTION/ DROPS INTRAOCULAR at 21:47

## 2024-05-26 RX ADMIN — GUAIFENESIN 400 MG: 400 TABLET ORAL at 16:45

## 2024-05-26 RX ADMIN — METOPROLOL SUCCINATE 50 MG: 50 TABLET, EXTENDED RELEASE ORAL at 12:01

## 2024-05-26 RX ADMIN — BUDESONIDE INHALATION 500 MCG: 0.5 SUSPENSION RESPIRATORY (INHALATION) at 19:48

## 2024-05-26 RX ADMIN — IPRATROPIUM BROMIDE AND ALBUTEROL SULFATE 1 DOSE: .5; 2.5 SOLUTION RESPIRATORY (INHALATION) at 08:15

## 2024-05-26 ASSESSMENT — PAIN DESCRIPTION - DESCRIPTORS
DESCRIPTORS: ACHING
DESCRIPTORS: SHARP;ACHING;DISCOMFORT
DESCRIPTORS: ACHING
DESCRIPTORS: ACHING;DISCOMFORT;SHARP
DESCRIPTORS: SHARP;SORE;ACHING

## 2024-05-26 ASSESSMENT — PAIN SCALES - GENERAL
PAINLEVEL_OUTOF10: 7
PAINLEVEL_OUTOF10: 9
PAINLEVEL_OUTOF10: 4
PAINLEVEL_OUTOF10: 9
PAINLEVEL_OUTOF10: 9

## 2024-05-26 ASSESSMENT — PAIN - FUNCTIONAL ASSESSMENT
PAIN_FUNCTIONAL_ASSESSMENT: PREVENTS OR INTERFERES WITH ALL ACTIVE AND SOME PASSIVE ACTIVITIES
PAIN_FUNCTIONAL_ASSESSMENT: ACTIVITIES ARE NOT PREVENTED
PAIN_FUNCTIONAL_ASSESSMENT: PREVENTS OR INTERFERES SOME ACTIVE ACTIVITIES AND ADLS
PAIN_FUNCTIONAL_ASSESSMENT: PREVENTS OR INTERFERES WITH ALL ACTIVE AND SOME PASSIVE ACTIVITIES

## 2024-05-26 ASSESSMENT — PAIN DESCRIPTION - LOCATION
LOCATION: SHOULDER

## 2024-05-26 ASSESSMENT — PAIN DESCRIPTION - PAIN TYPE: TYPE: CHRONIC PAIN;ACUTE PAIN

## 2024-05-26 ASSESSMENT — PAIN DESCRIPTION - ORIENTATION
ORIENTATION: RIGHT

## 2024-05-27 LAB
ALBUMIN SERPL-MCNC: 3.6 G/DL (ref 3.5–5.2)
ALP SERPL-CCNC: 123 U/L (ref 35–104)
ALT SERPL-CCNC: 14 U/L (ref 0–32)
ANION GAP SERPL CALCULATED.3IONS-SCNC: 15 MMOL/L (ref 7–16)
AST SERPL-CCNC: 20 U/L (ref 0–31)
BASOPHILS # BLD: 0 K/UL (ref 0–0.2)
BASOPHILS NFR BLD: 0 % (ref 0–2)
BILIRUB SERPL-MCNC: 0.4 MG/DL (ref 0–1.2)
BNP SERPL-MCNC: 4880 PG/ML (ref 0–125)
BUN SERPL-MCNC: 29 MG/DL (ref 6–23)
CALCIUM SERPL-MCNC: 9 MG/DL (ref 8.6–10.2)
CHLORIDE SERPL-SCNC: 98 MMOL/L (ref 98–107)
CO2 SERPL-SCNC: 30 MMOL/L (ref 22–29)
CREAT SERPL-MCNC: 1.2 MG/DL (ref 0.5–1)
EOSINOPHIL # BLD: 0.16 K/UL (ref 0.05–0.5)
EOSINOPHILS RELATIVE PERCENT: 3 % (ref 0–6)
ERYTHROCYTE [DISTWIDTH] IN BLOOD BY AUTOMATED COUNT: 17.2 % (ref 11.5–15)
GFR, ESTIMATED: 52 ML/MIN/1.73M2
GLUCOSE BLD-MCNC: 116 MG/DL (ref 74–99)
GLUCOSE BLD-MCNC: 120 MG/DL (ref 74–99)
GLUCOSE BLD-MCNC: 138 MG/DL (ref 74–99)
GLUCOSE BLD-MCNC: 87 MG/DL (ref 74–99)
GLUCOSE SERPL-MCNC: 105 MG/DL (ref 74–99)
HCT VFR BLD AUTO: 30.4 % (ref 34–48)
HGB BLD-MCNC: 9 G/DL (ref 11.5–15.5)
LYMPHOCYTES NFR BLD: 0.32 K/UL (ref 1.5–4)
LYMPHOCYTES RELATIVE PERCENT: 5 % (ref 20–42)
MCH RBC QN AUTO: 25.7 PG (ref 26–35)
MCHC RBC AUTO-ENTMCNC: 29.6 G/DL (ref 32–34.5)
MCV RBC AUTO: 86.9 FL (ref 80–99.9)
MONOCYTES NFR BLD: 0.42 K/UL (ref 0.1–0.95)
MONOCYTES NFR BLD: 7 % (ref 2–12)
NEUTROPHILS NFR BLD: 85 % (ref 43–80)
NEUTS SEG NFR BLD: 5.11 K/UL (ref 1.8–7.3)
PLATELET # BLD AUTO: 189 K/UL (ref 130–450)
PMV BLD AUTO: 10.5 FL (ref 7–12)
POTASSIUM SERPL-SCNC: 4.1 MMOL/L (ref 3.5–5)
PROT SERPL-MCNC: 6.2 G/DL (ref 6.4–8.3)
RBC # BLD AUTO: 3.5 M/UL (ref 3.5–5.5)
RBC # BLD: ABNORMAL 10*6/UL
SODIUM SERPL-SCNC: 143 MMOL/L (ref 132–146)
WBC OTHER # BLD: 6 K/UL (ref 4.5–11.5)

## 2024-05-27 PROCEDURE — 94660 CPAP INITIATION&MGMT: CPT

## 2024-05-27 PROCEDURE — 36415 COLL VENOUS BLD VENIPUNCTURE: CPT

## 2024-05-27 PROCEDURE — 80053 COMPREHEN METABOLIC PANEL: CPT

## 2024-05-27 PROCEDURE — 6370000000 HC RX 637 (ALT 250 FOR IP)

## 2024-05-27 PROCEDURE — 83880 ASSAY OF NATRIURETIC PEPTIDE: CPT

## 2024-05-27 PROCEDURE — 2580000003 HC RX 258

## 2024-05-27 PROCEDURE — 6360000002 HC RX W HCPCS

## 2024-05-27 PROCEDURE — 82962 GLUCOSE BLOOD TEST: CPT

## 2024-05-27 PROCEDURE — 85025 COMPLETE CBC W/AUTO DIFF WBC: CPT

## 2024-05-27 PROCEDURE — 2140000000 HC CCU INTERMEDIATE R&B

## 2024-05-27 PROCEDURE — 2700000000 HC OXYGEN THERAPY PER DAY

## 2024-05-27 PROCEDURE — 94640 AIRWAY INHALATION TREATMENT: CPT

## 2024-05-27 PROCEDURE — 6370000000 HC RX 637 (ALT 250 FOR IP): Performed by: INTERNAL MEDICINE

## 2024-05-27 RX ORDER — LIDOCAINE 4 G/G
1 PATCH TOPICAL DAILY
Status: DISCONTINUED | OUTPATIENT
Start: 2024-05-27 | End: 2024-06-03 | Stop reason: HOSPADM

## 2024-05-27 RX ADMIN — Medication 1000 UNITS: at 08:58

## 2024-05-27 RX ADMIN — SILDENAFIL 10 MG: 20 TABLET, FILM COATED ORAL at 13:28

## 2024-05-27 RX ADMIN — METOPROLOL SUCCINATE 50 MG: 50 TABLET, EXTENDED RELEASE ORAL at 22:12

## 2024-05-27 RX ADMIN — FUROSEMIDE 40 MG: 40 TABLET ORAL at 08:57

## 2024-05-27 RX ADMIN — CYCLOBENZAPRINE 10 MG: 10 TABLET, FILM COATED ORAL at 03:40

## 2024-05-27 RX ADMIN — IPRATROPIUM BROMIDE AND ALBUTEROL SULFATE 1 DOSE: .5; 2.5 SOLUTION RESPIRATORY (INHALATION) at 11:57

## 2024-05-27 RX ADMIN — IPRATROPIUM BROMIDE AND ALBUTEROL SULFATE 1 DOSE: .5; 2.5 SOLUTION RESPIRATORY (INHALATION) at 08:10

## 2024-05-27 RX ADMIN — ACETAMINOPHEN 650 MG: 325 TABLET ORAL at 04:57

## 2024-05-27 RX ADMIN — ARFORMOTEROL TARTRATE 15 MCG: 15 SOLUTION RESPIRATORY (INHALATION) at 19:39

## 2024-05-27 RX ADMIN — GUAIFENESIN 400 MG: 400 TABLET ORAL at 23:45

## 2024-05-27 RX ADMIN — BUDESONIDE INHALATION 500 MCG: 0.5 SUSPENSION RESPIRATORY (INHALATION) at 08:10

## 2024-05-27 RX ADMIN — SODIUM CHLORIDE, PRESERVATIVE FREE 10 ML: 5 INJECTION INTRAVENOUS at 08:56

## 2024-05-27 RX ADMIN — DULOXETINE HYDROCHLORIDE 60 MG: 60 CAPSULE, DELAYED RELEASE ORAL at 08:58

## 2024-05-27 RX ADMIN — ACETAMINOPHEN 650 MG: 325 TABLET ORAL at 22:12

## 2024-05-27 RX ADMIN — APIXABAN 5 MG: 5 TABLET, FILM COATED ORAL at 08:58

## 2024-05-27 RX ADMIN — OMEGA-3-ACID ETHYL ESTERS 2 G: 1 CAPSULE, LIQUID FILLED ORAL at 08:56

## 2024-05-27 RX ADMIN — LEVETIRACETAM 500 MG: 500 TABLET, FILM COATED ORAL at 22:12

## 2024-05-27 RX ADMIN — IPRATROPIUM BROMIDE AND ALBUTEROL SULFATE 1 DOSE: .5; 2.5 SOLUTION RESPIRATORY (INHALATION) at 19:39

## 2024-05-27 RX ADMIN — IPRATROPIUM BROMIDE AND ALBUTEROL SULFATE 1 DOSE: .5; 2.5 SOLUTION RESPIRATORY (INHALATION) at 16:03

## 2024-05-27 RX ADMIN — CYCLOBENZAPRINE 10 MG: 10 TABLET, FILM COATED ORAL at 23:45

## 2024-05-27 RX ADMIN — HYDROCORTISONE 10 MG: 5 TABLET ORAL at 08:58

## 2024-05-27 RX ADMIN — SODIUM CHLORIDE, PRESERVATIVE FREE 10 ML: 5 INJECTION INTRAVENOUS at 19:48

## 2024-05-27 RX ADMIN — SILDENAFIL 10 MG: 20 TABLET, FILM COATED ORAL at 08:56

## 2024-05-27 RX ADMIN — ACETAMINOPHEN 650 MG: 325 TABLET ORAL at 13:27

## 2024-05-27 RX ADMIN — BUDESONIDE INHALATION 500 MCG: 0.5 SUSPENSION RESPIRATORY (INHALATION) at 19:39

## 2024-05-27 RX ADMIN — FERROUS SULFATE TAB 325 MG (65 MG ELEMENTAL FE) 325 MG: 325 (65 FE) TAB at 08:58

## 2024-05-27 RX ADMIN — LEVOTHYROXINE SODIUM 50 MCG: 0.05 TABLET ORAL at 04:58

## 2024-05-27 RX ADMIN — DICLOFENAC SODIUM 2 G: 10 GEL TOPICAL at 19:48

## 2024-05-27 RX ADMIN — DESMOPRESSIN ACETATE 200 MCG: 0.1 TABLET ORAL at 08:56

## 2024-05-27 RX ADMIN — DIGOXIN 62.5 MCG: 0.12 TABLET ORAL at 08:57

## 2024-05-27 RX ADMIN — LEVETIRACETAM 500 MG: 500 TABLET, FILM COATED ORAL at 08:58

## 2024-05-27 RX ADMIN — AMLODIPINE BESYLATE 5 MG: 5 TABLET ORAL at 08:58

## 2024-05-27 RX ADMIN — METOPROLOL SUCCINATE 50 MG: 50 TABLET, EXTENDED RELEASE ORAL at 08:58

## 2024-05-27 RX ADMIN — SILDENAFIL 10 MG: 20 TABLET, FILM COATED ORAL at 22:12

## 2024-05-27 RX ADMIN — OMEGA-3-ACID ETHYL ESTERS 2 G: 1 CAPSULE, LIQUID FILLED ORAL at 22:11

## 2024-05-27 RX ADMIN — DESMOPRESSIN ACETATE 200 MCG: 0.1 TABLET ORAL at 22:11

## 2024-05-27 RX ADMIN — APIXABAN 5 MG: 5 TABLET, FILM COATED ORAL at 22:12

## 2024-05-27 RX ADMIN — TETRAHYDROZOLINE HCL 0.05% 1 DROP: 0.05 SOLUTION/ DROPS INTRAOCULAR at 09:04

## 2024-05-27 RX ADMIN — TETRAHYDROZOLINE HCL 0.05% 1 DROP: 0.05 SOLUTION/ DROPS INTRAOCULAR at 19:48

## 2024-05-27 RX ADMIN — ARFORMOTEROL TARTRATE 15 MCG: 15 SOLUTION RESPIRATORY (INHALATION) at 08:10

## 2024-05-27 RX ADMIN — TETRAHYDROZOLINE HCL 0.05% 1 DROP: 0.05 SOLUTION/ DROPS INTRAOCULAR at 13:24

## 2024-05-27 RX ADMIN — FERROUS SULFATE TAB 325 MG (65 MG ELEMENTAL FE) 325 MG: 325 (65 FE) TAB at 22:11

## 2024-05-27 RX ADMIN — DICLOFENAC SODIUM 2 G: 10 GEL TOPICAL at 09:03

## 2024-05-27 RX ADMIN — HYDROCORTISONE 5 MG: 5 TABLET ORAL at 22:13

## 2024-05-27 RX ADMIN — MULTIVITAMIN TABLET 1 TABLET: TABLET at 08:57

## 2024-05-27 ASSESSMENT — PAIN DESCRIPTION - DESCRIPTORS
DESCRIPTORS: ACHING;DISCOMFORT
DESCRIPTORS: ACHING

## 2024-05-27 ASSESSMENT — PAIN DESCRIPTION - ORIENTATION
ORIENTATION: RIGHT
ORIENTATION: RIGHT;LEFT;MID

## 2024-05-27 ASSESSMENT — PAIN DESCRIPTION - LOCATION
LOCATION: SHOULDER
LOCATION: HEAD

## 2024-05-27 ASSESSMENT — PAIN SCALES - GENERAL
PAINLEVEL_OUTOF10: 9
PAINLEVEL_OUTOF10: 7

## 2024-05-28 ENCOUNTER — APPOINTMENT (OUTPATIENT)
Dept: GENERAL RADIOLOGY | Age: 69
DRG: 208 | End: 2024-05-28
Payer: MEDICARE

## 2024-05-28 LAB
AADO2: 443 MMHG
ALBUMIN SERPL-MCNC: 3.6 G/DL (ref 3.5–5.2)
ALP SERPL-CCNC: 118 U/L (ref 35–104)
ALT SERPL-CCNC: 13 U/L (ref 0–32)
ANION GAP SERPL CALCULATED.3IONS-SCNC: 13 MMOL/L (ref 7–16)
AST SERPL-CCNC: 18 U/L (ref 0–31)
B.E.: 10.2 MMOL/L (ref -3–3)
BASOPHILS # BLD: 0 K/UL (ref 0–0.2)
BASOPHILS NFR BLD: 0 % (ref 0–2)
BILIRUB SERPL-MCNC: 0.4 MG/DL (ref 0–1.2)
BUN SERPL-MCNC: 34 MG/DL (ref 6–23)
CALCIUM SERPL-MCNC: 8.9 MG/DL (ref 8.6–10.2)
CHLORIDE SERPL-SCNC: 99 MMOL/L (ref 98–107)
CO2 SERPL-SCNC: 32 MMOL/L (ref 22–29)
COHB: 0.8 % (ref 0–1.5)
CREAT SERPL-MCNC: 1.3 MG/DL (ref 0.5–1)
CRITICAL: ABNORMAL
DATE ANALYZED: ABNORMAL
DATE OF COLLECTION: ABNORMAL
EOSINOPHIL # BLD: 0.05 K/UL (ref 0.05–0.5)
EOSINOPHILS RELATIVE PERCENT: 1 % (ref 0–6)
ERYTHROCYTE [DISTWIDTH] IN BLOOD BY AUTOMATED COUNT: 17.5 % (ref 11.5–15)
FIO2: 80 %
GFR, ESTIMATED: 45 ML/MIN/1.73M2
GLUCOSE BLD-MCNC: 114 MG/DL (ref 74–99)
GLUCOSE BLD-MCNC: 129 MG/DL (ref 74–99)
GLUCOSE BLD-MCNC: 85 MG/DL (ref 74–99)
GLUCOSE SERPL-MCNC: 94 MG/DL (ref 74–99)
HCO3: 36.4 MMOL/L (ref 22–26)
HCT VFR BLD AUTO: 30.4 % (ref 34–48)
HGB BLD-MCNC: 8.9 G/DL (ref 11.5–15.5)
HHB: 8.5 % (ref 0–5)
LAB: ABNORMAL
LYMPHOCYTES NFR BLD: 0.27 K/UL (ref 1.5–4)
LYMPHOCYTES RELATIVE PERCENT: 4 % (ref 20–42)
Lab: 930
MCH RBC QN AUTO: 25.3 PG (ref 26–35)
MCHC RBC AUTO-ENTMCNC: 29.3 G/DL (ref 32–34.5)
MCV RBC AUTO: 86.4 FL (ref 80–99.9)
METHB: 0.4 % (ref 0–1.5)
MODE: ABNORMAL
MONOCYTES NFR BLD: 0.49 K/UL (ref 0.1–0.95)
MONOCYTES NFR BLD: 8 % (ref 2–12)
NEUTROPHILS NFR BLD: 87 % (ref 43–80)
NEUTS SEG NFR BLD: 5.29 K/UL (ref 1.8–7.3)
NUCLEATED RED BLOOD CELLS: 1 PER 100 WBC
O2 CONTENT: 13.3 ML/DL
O2 SATURATION: 91.4 % (ref 92–98.5)
O2HB: 90.3 % (ref 94–97)
OPERATOR ID: 8217
PATIENT TEMP: 37 C
PCO2: 57.9 MMHG (ref 35–45)
PEEP/CPAP: 6 CMH2O
PFO2: 0.83 MMHG/%
PH BLOOD GAS: 7.42 (ref 7.35–7.45)
PLATELET # BLD AUTO: 193 K/UL (ref 130–450)
PMV BLD AUTO: 11 FL (ref 7–12)
PO2: 66.6 MMHG (ref 75–100)
POTASSIUM SERPL-SCNC: 3.9 MMOL/L (ref 3.5–5)
PROT SERPL-MCNC: 6.2 G/DL (ref 6.4–8.3)
RBC # BLD AUTO: 3.52 M/UL (ref 3.5–5.5)
RBC # BLD: ABNORMAL 10*6/UL
RI(T): 6.65
RR MECHANICAL: 14 B/MIN
SODIUM SERPL-SCNC: 144 MMOL/L (ref 132–146)
SOURCE, BLOOD GAS: ABNORMAL
THB: 10.4 G/DL (ref 11.5–16.5)
TIME ANALYZED: 940
VT MECHANICAL: 450 ML
WBC OTHER # BLD: 6.1 K/UL (ref 4.5–11.5)

## 2024-05-28 PROCEDURE — 82805 BLOOD GASES W/O2 SATURATION: CPT

## 2024-05-28 PROCEDURE — 6370000000 HC RX 637 (ALT 250 FOR IP)

## 2024-05-28 PROCEDURE — 2140000000 HC CCU INTERMEDIATE R&B

## 2024-05-28 PROCEDURE — 94660 CPAP INITIATION&MGMT: CPT

## 2024-05-28 PROCEDURE — 71046 X-RAY EXAM CHEST 2 VIEWS: CPT

## 2024-05-28 PROCEDURE — 36415 COLL VENOUS BLD VENIPUNCTURE: CPT

## 2024-05-28 PROCEDURE — 6360000002 HC RX W HCPCS

## 2024-05-28 PROCEDURE — 2580000003 HC RX 258

## 2024-05-28 PROCEDURE — 94640 AIRWAY INHALATION TREATMENT: CPT

## 2024-05-28 PROCEDURE — 80053 COMPREHEN METABOLIC PANEL: CPT

## 2024-05-28 PROCEDURE — 2700000000 HC OXYGEN THERAPY PER DAY

## 2024-05-28 PROCEDURE — 36600 WITHDRAWAL OF ARTERIAL BLOOD: CPT

## 2024-05-28 PROCEDURE — 82962 GLUCOSE BLOOD TEST: CPT

## 2024-05-28 PROCEDURE — 85025 COMPLETE CBC W/AUTO DIFF WBC: CPT

## 2024-05-28 PROCEDURE — 6370000000 HC RX 637 (ALT 250 FOR IP): Performed by: INTERNAL MEDICINE

## 2024-05-28 RX ADMIN — TETRAHYDROZOLINE HCL 0.05% 1 DROP: 0.05 SOLUTION/ DROPS INTRAOCULAR at 10:45

## 2024-05-28 RX ADMIN — CYCLOBENZAPRINE 10 MG: 10 TABLET, FILM COATED ORAL at 23:45

## 2024-05-28 RX ADMIN — TETRAHYDROZOLINE HCL 0.05% 1 DROP: 0.05 SOLUTION/ DROPS INTRAOCULAR at 17:58

## 2024-05-28 RX ADMIN — SODIUM CHLORIDE, PRESERVATIVE FREE 10 ML: 5 INJECTION INTRAVENOUS at 10:46

## 2024-05-28 RX ADMIN — IPRATROPIUM BROMIDE AND ALBUTEROL SULFATE 1 DOSE: .5; 2.5 SOLUTION RESPIRATORY (INHALATION) at 15:40

## 2024-05-28 RX ADMIN — APIXABAN 5 MG: 5 TABLET, FILM COATED ORAL at 23:43

## 2024-05-28 RX ADMIN — FUROSEMIDE 40 MG: 40 TABLET ORAL at 10:50

## 2024-05-28 RX ADMIN — ARFORMOTEROL TARTRATE 15 MCG: 15 SOLUTION RESPIRATORY (INHALATION) at 07:55

## 2024-05-28 RX ADMIN — HYDROCORTISONE 10 MG: 5 TABLET ORAL at 10:48

## 2024-05-28 RX ADMIN — DULOXETINE HYDROCHLORIDE 60 MG: 60 CAPSULE, DELAYED RELEASE ORAL at 10:46

## 2024-05-28 RX ADMIN — LEVETIRACETAM 500 MG: 500 TABLET, FILM COATED ORAL at 10:51

## 2024-05-28 RX ADMIN — AMLODIPINE BESYLATE 5 MG: 5 TABLET ORAL at 10:47

## 2024-05-28 RX ADMIN — FERROUS SULFATE TAB 325 MG (65 MG ELEMENTAL FE) 325 MG: 325 (65 FE) TAB at 23:45

## 2024-05-28 RX ADMIN — ACETAMINOPHEN 650 MG: 325 TABLET ORAL at 05:34

## 2024-05-28 RX ADMIN — ONDANSETRON 4 MG: 2 INJECTION INTRAMUSCULAR; INTRAVENOUS at 00:55

## 2024-05-28 RX ADMIN — BUDESONIDE INHALATION 500 MCG: 0.5 SUSPENSION RESPIRATORY (INHALATION) at 07:56

## 2024-05-28 RX ADMIN — DIGOXIN 62.5 MCG: 0.12 TABLET ORAL at 10:49

## 2024-05-28 RX ADMIN — SILDENAFIL 10 MG: 20 TABLET, FILM COATED ORAL at 10:49

## 2024-05-28 RX ADMIN — SODIUM CHLORIDE, PRESERVATIVE FREE 10 ML: 5 INJECTION INTRAVENOUS at 23:48

## 2024-05-28 RX ADMIN — IPRATROPIUM BROMIDE AND ALBUTEROL SULFATE 1 DOSE: .5; 2.5 SOLUTION RESPIRATORY (INHALATION) at 18:44

## 2024-05-28 RX ADMIN — SILDENAFIL 10 MG: 20 TABLET, FILM COATED ORAL at 23:45

## 2024-05-28 RX ADMIN — BUDESONIDE INHALATION 500 MCG: 0.5 SUSPENSION RESPIRATORY (INHALATION) at 18:44

## 2024-05-28 RX ADMIN — FERROUS SULFATE TAB 325 MG (65 MG ELEMENTAL FE) 325 MG: 325 (65 FE) TAB at 10:46

## 2024-05-28 RX ADMIN — IPRATROPIUM BROMIDE AND ALBUTEROL SULFATE 1 DOSE: .5; 2.5 SOLUTION RESPIRATORY (INHALATION) at 12:57

## 2024-05-28 RX ADMIN — IPRATROPIUM BROMIDE AND ALBUTEROL SULFATE 1 DOSE: .5; 2.5 SOLUTION RESPIRATORY (INHALATION) at 07:54

## 2024-05-28 RX ADMIN — METOPROLOL SUCCINATE 50 MG: 50 TABLET, EXTENDED RELEASE ORAL at 10:48

## 2024-05-28 RX ADMIN — ACETAMINOPHEN 650 MG: 325 TABLET ORAL at 17:47

## 2024-05-28 RX ADMIN — Medication 1000 UNITS: at 10:48

## 2024-05-28 RX ADMIN — ACETAMINOPHEN 650 MG: 325 TABLET ORAL at 23:44

## 2024-05-28 RX ADMIN — OMEGA-3-ACID ETHYL ESTERS 2 G: 1 CAPSULE, LIQUID FILLED ORAL at 10:52

## 2024-05-28 RX ADMIN — LEVOTHYROXINE SODIUM 50 MCG: 0.05 TABLET ORAL at 05:34

## 2024-05-28 RX ADMIN — SILDENAFIL 10 MG: 20 TABLET, FILM COATED ORAL at 17:47

## 2024-05-28 RX ADMIN — DESMOPRESSIN ACETATE 200 MCG: 0.1 TABLET ORAL at 10:53

## 2024-05-28 RX ADMIN — MULTIVITAMIN TABLET 1 TABLET: TABLET at 10:48

## 2024-05-28 RX ADMIN — DESMOPRESSIN ACETATE 200 MCG: 0.1 TABLET ORAL at 23:43

## 2024-05-28 RX ADMIN — OMEGA-3-ACID ETHYL ESTERS 2 G: 1 CAPSULE, LIQUID FILLED ORAL at 23:42

## 2024-05-28 RX ADMIN — APIXABAN 5 MG: 5 TABLET, FILM COATED ORAL at 10:50

## 2024-05-28 RX ADMIN — LEVETIRACETAM 500 MG: 500 TABLET, FILM COATED ORAL at 23:43

## 2024-05-28 RX ADMIN — ARFORMOTEROL TARTRATE 15 MCG: 15 SOLUTION RESPIRATORY (INHALATION) at 18:44

## 2024-05-28 RX ADMIN — HYDROCORTISONE 5 MG: 5 TABLET ORAL at 23:44

## 2024-05-28 RX ADMIN — GUAIFENESIN 400 MG: 400 TABLET ORAL at 18:25

## 2024-05-28 ASSESSMENT — PAIN DESCRIPTION - DESCRIPTORS: DESCRIPTORS: SHARP;THROBBING;ACHING

## 2024-05-28 ASSESSMENT — PAIN DESCRIPTION - LOCATION: LOCATION: SHOULDER

## 2024-05-28 ASSESSMENT — PAIN DESCRIPTION - ORIENTATION: ORIENTATION: RIGHT

## 2024-05-28 ASSESSMENT — PAIN SCALES - GENERAL
PAINLEVEL_OUTOF10: 9
PAINLEVEL_OUTOF10: 9
PAINLEVEL_OUTOF10: 0

## 2024-05-28 ASSESSMENT — PAIN - FUNCTIONAL ASSESSMENT: PAIN_FUNCTIONAL_ASSESSMENT: PREVENTS OR INTERFERES WITH ALL ACTIVE AND SOME PASSIVE ACTIVITIES

## 2024-05-29 ENCOUNTER — APPOINTMENT (OUTPATIENT)
Dept: GENERAL RADIOLOGY | Age: 69
DRG: 208 | End: 2024-05-29
Payer: MEDICARE

## 2024-05-29 LAB
ALBUMIN SERPL-MCNC: 3.6 G/DL (ref 3.5–5.2)
ALP SERPL-CCNC: 129 U/L (ref 35–104)
ALT SERPL-CCNC: 15 U/L (ref 0–32)
ANION GAP SERPL CALCULATED.3IONS-SCNC: 14 MMOL/L (ref 7–16)
AST SERPL-CCNC: 23 U/L (ref 0–31)
B PARAP IS1001 DNA NPH QL NAA+NON-PROBE: NOT DETECTED
B PERT DNA SPEC QL NAA+PROBE: NOT DETECTED
BASOPHILS # BLD: 0 K/UL (ref 0–0.2)
BASOPHILS NFR BLD: 0 % (ref 0–2)
BILIRUB SERPL-MCNC: 0.3 MG/DL (ref 0–1.2)
BUN SERPL-MCNC: 43 MG/DL (ref 6–23)
C PNEUM DNA NPH QL NAA+NON-PROBE: NOT DETECTED
CALCIUM SERPL-MCNC: 9 MG/DL (ref 8.6–10.2)
CHLORIDE SERPL-SCNC: 100 MMOL/L (ref 98–107)
CO2 SERPL-SCNC: 32 MMOL/L (ref 22–29)
CREAT SERPL-MCNC: 1.5 MG/DL (ref 0.5–1)
CREAT UR-MCNC: 85 MG/DL (ref 29–226)
EOSINOPHIL # BLD: 0.13 K/UL (ref 0.05–0.5)
EOSINOPHILS RELATIVE PERCENT: 2 % (ref 0–6)
ERYTHROCYTE [DISTWIDTH] IN BLOOD BY AUTOMATED COUNT: 17.5 % (ref 11.5–15)
FLUAV RNA NPH QL NAA+NON-PROBE: NOT DETECTED
FLUBV RNA NPH QL NAA+NON-PROBE: NOT DETECTED
GFR, ESTIMATED: 37 ML/MIN/1.73M2
GLUCOSE BLD-MCNC: 100 MG/DL (ref 74–99)
GLUCOSE BLD-MCNC: 116 MG/DL (ref 74–99)
GLUCOSE BLD-MCNC: 148 MG/DL (ref 74–99)
GLUCOSE BLD-MCNC: 99 MG/DL (ref 74–99)
GLUCOSE SERPL-MCNC: 104 MG/DL (ref 74–99)
HADV DNA NPH QL NAA+NON-PROBE: NOT DETECTED
HCOV 229E RNA NPH QL NAA+NON-PROBE: NOT DETECTED
HCOV HKU1 RNA NPH QL NAA+NON-PROBE: NOT DETECTED
HCOV NL63 RNA NPH QL NAA+NON-PROBE: NOT DETECTED
HCOV OC43 RNA NPH QL NAA+NON-PROBE: NOT DETECTED
HCT VFR BLD AUTO: 31.2 % (ref 34–48)
HGB BLD-MCNC: 9.3 G/DL (ref 11.5–15.5)
HMPV RNA NPH QL NAA+NON-PROBE: NOT DETECTED
HPIV1 RNA NPH QL NAA+NON-PROBE: NOT DETECTED
HPIV2 RNA NPH QL NAA+NON-PROBE: NOT DETECTED
HPIV3 RNA NPH QL NAA+NON-PROBE: NOT DETECTED
HPIV4 RNA NPH QL NAA+NON-PROBE: NOT DETECTED
LYMPHOCYTES NFR BLD: 0.25 K/UL (ref 1.5–4)
LYMPHOCYTES RELATIVE PERCENT: 4 % (ref 20–42)
M PNEUMO DNA NPH QL NAA+NON-PROBE: NOT DETECTED
MCH RBC QN AUTO: 25.7 PG (ref 26–35)
MCHC RBC AUTO-ENTMCNC: 29.8 G/DL (ref 32–34.5)
MCV RBC AUTO: 86.2 FL (ref 80–99.9)
MONOCYTES NFR BLD: 0.19 K/UL (ref 0.1–0.95)
MONOCYTES NFR BLD: 3 % (ref 2–12)
NEUTROPHILS NFR BLD: 92 % (ref 43–80)
NEUTS SEG NFR BLD: 6.63 K/UL (ref 1.8–7.3)
PLATELET # BLD AUTO: 184 K/UL (ref 130–450)
PMV BLD AUTO: 10.9 FL (ref 7–12)
POTASSIUM SERPL-SCNC: 3.8 MMOL/L (ref 3.5–5)
PROT SERPL-MCNC: 6.3 G/DL (ref 6.4–8.3)
RBC # BLD AUTO: 3.62 M/UL (ref 3.5–5.5)
RBC # BLD: ABNORMAL 10*6/UL
RSV RNA NPH QL NAA+NON-PROBE: NOT DETECTED
RV+EV RNA NPH QL NAA+NON-PROBE: NOT DETECTED
SARS-COV-2 RNA NPH QL NAA+NON-PROBE: NOT DETECTED
SODIUM SERPL-SCNC: 146 MMOL/L (ref 132–146)
SODIUM UR-SCNC: <20 MMOL/L
SPECIMEN DESCRIPTION: NORMAL
UUN UR-MCNC: 787 MG/DL (ref 800–1666)
WBC OTHER # BLD: 7.2 K/UL (ref 4.5–11.5)

## 2024-05-29 PROCEDURE — 6370000000 HC RX 637 (ALT 250 FOR IP)

## 2024-05-29 PROCEDURE — 84540 ASSAY OF URINE/UREA-N: CPT

## 2024-05-29 PROCEDURE — 2580000003 HC RX 258

## 2024-05-29 PROCEDURE — 84300 ASSAY OF URINE SODIUM: CPT

## 2024-05-29 PROCEDURE — 82962 GLUCOSE BLOOD TEST: CPT

## 2024-05-29 PROCEDURE — 6360000002 HC RX W HCPCS

## 2024-05-29 PROCEDURE — 2140000000 HC CCU INTERMEDIATE R&B

## 2024-05-29 PROCEDURE — 51798 US URINE CAPACITY MEASURE: CPT

## 2024-05-29 PROCEDURE — 80053 COMPREHEN METABOLIC PANEL: CPT

## 2024-05-29 PROCEDURE — 36415 COLL VENOUS BLD VENIPUNCTURE: CPT

## 2024-05-29 PROCEDURE — 2700000000 HC OXYGEN THERAPY PER DAY

## 2024-05-29 PROCEDURE — 82570 ASSAY OF URINE CREATININE: CPT

## 2024-05-29 PROCEDURE — 71045 X-RAY EXAM CHEST 1 VIEW: CPT

## 2024-05-29 PROCEDURE — 94660 CPAP INITIATION&MGMT: CPT

## 2024-05-29 PROCEDURE — 0202U NFCT DS 22 TRGT SARS-COV-2: CPT

## 2024-05-29 PROCEDURE — 85025 COMPLETE CBC W/AUTO DIFF WBC: CPT

## 2024-05-29 PROCEDURE — 94640 AIRWAY INHALATION TREATMENT: CPT

## 2024-05-29 RX ORDER — FUROSEMIDE 40 MG/1
40 TABLET ORAL DAILY
Status: DISCONTINUED | OUTPATIENT
Start: 2024-05-30 | End: 2024-05-29

## 2024-05-29 RX ORDER — FUROSEMIDE 20 MG/1
20 TABLET ORAL DAILY
Status: DISCONTINUED | OUTPATIENT
Start: 2024-05-29 | End: 2024-05-29

## 2024-05-29 RX ORDER — FUROSEMIDE 20 MG/1
20 TABLET ORAL DAILY
Status: DISCONTINUED | OUTPATIENT
Start: 2024-05-30 | End: 2024-06-03 | Stop reason: HOSPADM

## 2024-05-29 RX ADMIN — TETRAHYDROZOLINE HCL 0.05% 1 DROP: 0.05 SOLUTION/ DROPS INTRAOCULAR at 16:57

## 2024-05-29 RX ADMIN — OMEGA-3-ACID ETHYL ESTERS 2 G: 1 CAPSULE, LIQUID FILLED ORAL at 08:06

## 2024-05-29 RX ADMIN — ACETAMINOPHEN 650 MG: 325 TABLET ORAL at 20:02

## 2024-05-29 RX ADMIN — METOPROLOL SUCCINATE 50 MG: 50 TABLET, EXTENDED RELEASE ORAL at 20:03

## 2024-05-29 RX ADMIN — ARFORMOTEROL TARTRATE 15 MCG: 15 SOLUTION RESPIRATORY (INHALATION) at 08:49

## 2024-05-29 RX ADMIN — METOPROLOL SUCCINATE 50 MG: 50 TABLET, EXTENDED RELEASE ORAL at 08:04

## 2024-05-29 RX ADMIN — LEVETIRACETAM 500 MG: 500 TABLET, FILM COATED ORAL at 20:04

## 2024-05-29 RX ADMIN — BUDESONIDE INHALATION 500 MCG: 0.5 SUSPENSION RESPIRATORY (INHALATION) at 22:15

## 2024-05-29 RX ADMIN — FERROUS SULFATE TAB 325 MG (65 MG ELEMENTAL FE) 325 MG: 325 (65 FE) TAB at 08:03

## 2024-05-29 RX ADMIN — SILDENAFIL 10 MG: 20 TABLET, FILM COATED ORAL at 20:04

## 2024-05-29 RX ADMIN — APIXABAN 5 MG: 5 TABLET, FILM COATED ORAL at 20:02

## 2024-05-29 RX ADMIN — ACETAMINOPHEN 650 MG: 325 TABLET ORAL at 13:18

## 2024-05-29 RX ADMIN — FUROSEMIDE 20 MG: 20 TABLET ORAL at 08:02

## 2024-05-29 RX ADMIN — DESMOPRESSIN ACETATE 200 MCG: 0.1 TABLET ORAL at 08:04

## 2024-05-29 RX ADMIN — GUAIFENESIN 400 MG: 400 TABLET ORAL at 20:18

## 2024-05-29 RX ADMIN — ARFORMOTEROL TARTRATE 15 MCG: 15 SOLUTION RESPIRATORY (INHALATION) at 22:15

## 2024-05-29 RX ADMIN — TETRAHYDROZOLINE HCL 0.05% 1 DROP: 0.05 SOLUTION/ DROPS INTRAOCULAR at 07:59

## 2024-05-29 RX ADMIN — OMEGA-3-ACID ETHYL ESTERS 2 G: 1 CAPSULE, LIQUID FILLED ORAL at 20:02

## 2024-05-29 RX ADMIN — DICLOFENAC SODIUM 2 G: 10 GEL TOPICAL at 08:00

## 2024-05-29 RX ADMIN — SODIUM CHLORIDE, PRESERVATIVE FREE 10 ML: 5 INJECTION INTRAVENOUS at 08:01

## 2024-05-29 RX ADMIN — IPRATROPIUM BROMIDE AND ALBUTEROL SULFATE 1 DOSE: .5; 2.5 SOLUTION RESPIRATORY (INHALATION) at 22:15

## 2024-05-29 RX ADMIN — AMLODIPINE BESYLATE 5 MG: 5 TABLET ORAL at 08:04

## 2024-05-29 RX ADMIN — Medication 1000 UNITS: at 08:03

## 2024-05-29 RX ADMIN — CYCLOBENZAPRINE 10 MG: 10 TABLET, FILM COATED ORAL at 16:57

## 2024-05-29 RX ADMIN — DIGOXIN 62.5 MCG: 0.12 TABLET ORAL at 08:03

## 2024-05-29 RX ADMIN — LEVOTHYROXINE SODIUM 50 MCG: 0.05 TABLET ORAL at 05:22

## 2024-05-29 RX ADMIN — IPRATROPIUM BROMIDE AND ALBUTEROL SULFATE 1 DOSE: .5; 2.5 SOLUTION RESPIRATORY (INHALATION) at 17:20

## 2024-05-29 RX ADMIN — DICLOFENAC SODIUM 2 G: 10 GEL TOPICAL at 20:09

## 2024-05-29 RX ADMIN — BUDESONIDE INHALATION 500 MCG: 0.5 SUSPENSION RESPIRATORY (INHALATION) at 08:49

## 2024-05-29 RX ADMIN — ACETAMINOPHEN 650 MG: 325 TABLET ORAL at 05:22

## 2024-05-29 RX ADMIN — HYDROCORTISONE 5 MG: 5 TABLET ORAL at 20:06

## 2024-05-29 RX ADMIN — SILDENAFIL 10 MG: 20 TABLET, FILM COATED ORAL at 08:05

## 2024-05-29 RX ADMIN — LEVETIRACETAM 500 MG: 500 TABLET, FILM COATED ORAL at 08:01

## 2024-05-29 RX ADMIN — FERROUS SULFATE TAB 325 MG (65 MG ELEMENTAL FE) 325 MG: 325 (65 FE) TAB at 20:04

## 2024-05-29 RX ADMIN — DESMOPRESSIN ACETATE 200 MCG: 0.1 TABLET ORAL at 20:03

## 2024-05-29 RX ADMIN — HYDROCORTISONE 10 MG: 5 TABLET ORAL at 08:07

## 2024-05-29 RX ADMIN — TETRAHYDROZOLINE HCL 0.05% 1 DROP: 0.05 SOLUTION/ DROPS INTRAOCULAR at 01:20

## 2024-05-29 RX ADMIN — DULOXETINE HYDROCHLORIDE 60 MG: 60 CAPSULE, DELAYED RELEASE ORAL at 08:02

## 2024-05-29 RX ADMIN — IPRATROPIUM BROMIDE AND ALBUTEROL SULFATE 1 DOSE: .5; 2.5 SOLUTION RESPIRATORY (INHALATION) at 08:49

## 2024-05-29 RX ADMIN — APIXABAN 5 MG: 5 TABLET, FILM COATED ORAL at 08:03

## 2024-05-29 RX ADMIN — MULTIVITAMIN TABLET 1 TABLET: TABLET at 08:04

## 2024-05-29 RX ADMIN — SODIUM CHLORIDE, PRESERVATIVE FREE 10 ML: 5 INJECTION INTRAVENOUS at 20:05

## 2024-05-29 RX ADMIN — SILDENAFIL 10 MG: 20 TABLET, FILM COATED ORAL at 16:57

## 2024-05-29 RX ADMIN — TETRAHYDROZOLINE HCL 0.05% 1 DROP: 0.05 SOLUTION/ DROPS INTRAOCULAR at 20:07

## 2024-05-29 ASSESSMENT — PAIN DESCRIPTION - PAIN TYPE: TYPE: ACUTE PAIN;CHRONIC PAIN

## 2024-05-29 ASSESSMENT — PAIN DESCRIPTION - DESCRIPTORS
DESCRIPTORS: ACHING;DISCOMFORT;SHARP
DESCRIPTORS: SHARP;ACHING;DISCOMFORT
DESCRIPTORS: ACHING;DISCOMFORT;SORE
DESCRIPTORS: ACHING;DISCOMFORT;SHARP

## 2024-05-29 ASSESSMENT — PAIN DESCRIPTION - LOCATION
LOCATION: SHOULDER
LOCATION: BACK;SHOULDER
LOCATION: SHOULDER
LOCATION: SHOULDER

## 2024-05-29 ASSESSMENT — PAIN SCALES - GENERAL
PAINLEVEL_OUTOF10: 9
PAINLEVEL_OUTOF10: 6
PAINLEVEL_OUTOF10: 0
PAINLEVEL_OUTOF10: 9
PAINLEVEL_OUTOF10: 9
PAINLEVEL_OUTOF10: 8
PAINLEVEL_OUTOF10: 2

## 2024-05-29 ASSESSMENT — PAIN - FUNCTIONAL ASSESSMENT
PAIN_FUNCTIONAL_ASSESSMENT: PREVENTS OR INTERFERES SOME ACTIVE ACTIVITIES AND ADLS

## 2024-05-29 ASSESSMENT — PAIN DESCRIPTION - ORIENTATION
ORIENTATION: RIGHT
ORIENTATION: RIGHT;LEFT

## 2024-05-29 ASSESSMENT — PAIN DESCRIPTION - ONSET: ONSET: ON-GOING

## 2024-05-29 ASSESSMENT — PAIN DESCRIPTION - FREQUENCY: FREQUENCY: INTERMITTENT

## 2024-05-30 ENCOUNTER — APPOINTMENT (OUTPATIENT)
Dept: GENERAL RADIOLOGY | Age: 69
DRG: 208 | End: 2024-05-30
Payer: MEDICARE

## 2024-05-30 PROBLEM — J81.0 ACUTE PULMONARY EDEMA (HCC): Status: ACTIVE | Noted: 2024-05-30

## 2024-05-30 LAB
AADO2: 601.5 MMHG
AADO2: 604.9 MMHG
AADO2: 613 MMHG
ALBUMIN SERPL-MCNC: 3.7 G/DL (ref 3.5–5.2)
ALP SERPL-CCNC: 136 U/L (ref 35–104)
ALT SERPL-CCNC: 22 U/L (ref 0–32)
ANION GAP SERPL CALCULATED.3IONS-SCNC: 14 MMOL/L (ref 7–16)
ANION GAP SERPL CALCULATED.3IONS-SCNC: 9 MMOL/L (ref 7–16)
AST SERPL-CCNC: 33 U/L (ref 0–31)
B.E.: 10 MMOL/L (ref -3–3)
B.E.: 6.8 MMOL/L (ref -3–3)
B.E.: 9.9 MMOL/L (ref -3–3)
BASOPHILS # BLD: 0 K/UL (ref 0–0.2)
BASOPHILS # BLD: 0.03 K/UL (ref 0–0.2)
BASOPHILS NFR BLD: 0 % (ref 0–2)
BASOPHILS NFR BLD: 0 % (ref 0–2)
BILIRUB SERPL-MCNC: 0.4 MG/DL (ref 0–1.2)
BNP SERPL-MCNC: 5162 PG/ML (ref 0–125)
BNP SERPL-MCNC: 6451 PG/ML (ref 0–125)
BUN SERPL-MCNC: 32 MG/DL (ref 6–23)
BUN SERPL-MCNC: 44 MG/DL (ref 6–23)
CALCIUM SERPL-MCNC: 9 MG/DL (ref 8.6–10.2)
CALCIUM SERPL-MCNC: 9.2 MG/DL (ref 8.6–10.2)
CHLORIDE SERPL-SCNC: 98 MMOL/L (ref 98–107)
CHLORIDE SERPL-SCNC: 99 MMOL/L (ref 98–107)
CO2 SERPL-SCNC: 32 MMOL/L (ref 22–29)
CO2 SERPL-SCNC: 35 MMOL/L (ref 22–29)
COHB: 0.9 % (ref 0–1.5)
COHB: 1 % (ref 0–1.5)
COHB: 1 % (ref 0–1.5)
CREAT SERPL-MCNC: 1.1 MG/DL (ref 0.5–1)
CREAT SERPL-MCNC: 1.3 MG/DL (ref 0.5–1)
CRITICAL: ABNORMAL
DATE ANALYZED: ABNORMAL
DATE OF COLLECTION: ABNORMAL
EOSINOPHIL # BLD: 0 K/UL (ref 0.05–0.5)
EOSINOPHIL # BLD: 0.18 K/UL (ref 0.05–0.5)
EOSINOPHILS RELATIVE PERCENT: 0 % (ref 0–6)
EOSINOPHILS RELATIVE PERCENT: 3 % (ref 0–6)
ERYTHROCYTE [DISTWIDTH] IN BLOOD BY AUTOMATED COUNT: 17.4 % (ref 11.5–15)
ERYTHROCYTE [DISTWIDTH] IN BLOOD BY AUTOMATED COUNT: 17.8 % (ref 11.5–15)
FIO2: 100 %
FLOW RATE: 15
GFR, ESTIMATED: 45 ML/MIN/1.73M2
GFR, ESTIMATED: 54 ML/MIN/1.73M2
GLUCOSE BLD-MCNC: 101 MG/DL (ref 74–99)
GLUCOSE BLD-MCNC: 78 MG/DL (ref 74–99)
GLUCOSE BLD-MCNC: 80 MG/DL (ref 74–99)
GLUCOSE BLD-MCNC: 81 MG/DL (ref 74–99)
GLUCOSE BLD-MCNC: 96 MG/DL (ref 74–99)
GLUCOSE SERPL-MCNC: 104 MG/DL (ref 74–99)
GLUCOSE SERPL-MCNC: 75 MG/DL (ref 74–99)
HCO3: 31.4 MMOL/L (ref 22–26)
HCO3: 34.9 MMOL/L (ref 22–26)
HCO3: 35.3 MMOL/L (ref 22–26)
HCT VFR BLD AUTO: 31.3 % (ref 34–48)
HCT VFR BLD AUTO: 31.8 % (ref 34–48)
HGB BLD-MCNC: 9.2 G/DL (ref 11.5–15.5)
HGB BLD-MCNC: 9.4 G/DL (ref 11.5–15.5)
HHB: 12.4 % (ref 0–5)
HHB: 15.4 % (ref 0–5)
HHB: 7.6 % (ref 0–5)
IMM GRANULOCYTES # BLD AUTO: 0.03 K/UL (ref 0–0.58)
IMM GRANULOCYTES NFR BLD: 0 % (ref 0–5)
LAB: ABNORMAL
LYMPHOCYTES NFR BLD: 0.48 K/UL (ref 1.5–4)
LYMPHOCYTES NFR BLD: 0.98 K/UL (ref 1.5–4)
LYMPHOCYTES RELATIVE PERCENT: 5 % (ref 20–42)
LYMPHOCYTES RELATIVE PERCENT: 7 % (ref 20–42)
Lab: 1827
Lab: 1845
Lab: 2030
MAGNESIUM SERPL-MCNC: 2.1 MG/DL (ref 1.6–2.6)
MCH RBC QN AUTO: 25.3 PG (ref 26–35)
MCH RBC QN AUTO: 25.6 PG (ref 26–35)
MCHC RBC AUTO-ENTMCNC: 29.4 G/DL (ref 32–34.5)
MCHC RBC AUTO-ENTMCNC: 29.6 G/DL (ref 32–34.5)
MCV RBC AUTO: 85.5 FL (ref 80–99.9)
MCV RBC AUTO: 86.9 FL (ref 80–99.9)
METHB: 0.3 % (ref 0–1.5)
METHB: 0.4 % (ref 0–1.5)
METHB: 0.4 % (ref 0–1.5)
MODE: ABNORMAL
MONOCYTES NFR BLD: 0.58 K/UL (ref 0.1–0.95)
MONOCYTES NFR BLD: 0.98 K/UL (ref 0.1–0.95)
MONOCYTES NFR BLD: 5 % (ref 2–12)
MONOCYTES NFR BLD: 8 % (ref 2–12)
NEUTROPHILS NFR BLD: 81 % (ref 43–80)
NEUTROPHILS NFR BLD: 90 % (ref 43–80)
NEUTS SEG NFR BLD: 16.73 K/UL (ref 1.8–7.3)
NEUTS SEG NFR BLD: 5.66 K/UL (ref 1.8–7.3)
O2 CONTENT: 12.7 ML/DL
O2 CONTENT: 13.1 ML/DL
O2 CONTENT: 13.5 ML/DL
O2 DELIVERY DEVICE: ABNORMAL
O2 SATURATION: 84.4 % (ref 92–98.5)
O2 SATURATION: 87.4 % (ref 92–98.5)
O2 SATURATION: 92.3 % (ref 92–98.5)
O2HB: 83.2 % (ref 94–97)
O2HB: 86.4 % (ref 94–97)
O2HB: 91 % (ref 94–97)
OPERATOR ID: 405
OPERATOR ID: 405
OPERATOR ID: 5123
PATIENT TEMP: 37 C
PCO2: 45.2 MMHG (ref 35–45)
PCO2: 49 MMHG (ref 35–45)
PCO2: 51.3 MMHG (ref 35–45)
PEEP/CPAP: 6 CMH2O
PEEP/CPAP: 6 CMH2O
PFO2: 0.51 MMHG/%
PFO2: 0.57 MMHG/%
PFO2: 0.66 MMHG/%
PH BLOOD GAS: 7.46 (ref 7.35–7.45)
PH BLOOD GAS: 7.46 (ref 7.35–7.45)
PH BLOOD GAS: 7.47 (ref 7.35–7.45)
PHOSPHATE SERPL-MCNC: 3.5 MG/DL (ref 2.5–4.5)
PLATELET # BLD AUTO: 164 K/UL (ref 130–450)
PLATELET # BLD AUTO: 166 K/UL (ref 130–450)
PMV BLD AUTO: 11.6 FL (ref 7–12)
PMV BLD AUTO: 12.1 FL (ref 7–12)
PO2: 51 MMHG (ref 75–100)
PO2: 56.8 MMHG (ref 75–100)
PO2: 66.3 MMHG (ref 75–100)
POC HCO3: 33.8 MMOL/L (ref 22–26)
POC O2 SATURATION: 99.7 % (ref 92–98.5)
POC PCO2: 42.2 MM HG (ref 35–45)
POC PH: 7.51 (ref 7.35–7.45)
POC PO2: 181 MM HG (ref 60–80)
POSITIVE BASE EXCESS, ART: 9.8 MMOL/L (ref 0–3)
POTASSIUM SERPL-SCNC: 3.56 MMOL/L (ref 3.5–5)
POTASSIUM SERPL-SCNC: 3.63 MMOL/L (ref 3.5–5)
POTASSIUM SERPL-SCNC: 3.7 MMOL/L (ref 3.5–5)
POTASSIUM SERPL-SCNC: 3.9 MMOL/L (ref 3.5–5)
PROCALCITONIN SERPL-MCNC: 0.99 NG/ML (ref 0–0.08)
PROT SERPL-MCNC: 6.5 G/DL (ref 6.4–8.3)
RBC # BLD AUTO: 3.6 M/UL (ref 3.5–5.5)
RBC # BLD AUTO: 3.72 M/UL (ref 3.5–5.5)
RBC # BLD: ABNORMAL 10*6/UL
RI(T): 10.65
RI(T): 12.02
RI(T): 9.07
RR MECHANICAL: 14 B/MIN
RR MECHANICAL: 14 B/MIN
SODIUM SERPL-SCNC: 143 MMOL/L (ref 132–146)
SODIUM SERPL-SCNC: 144 MMOL/L (ref 132–146)
SOURCE, BLOOD GAS: ABNORMAL
THB: 10.5 G/DL (ref 11.5–16.5)
THB: 10.8 G/DL (ref 11.5–16.5)
THB: 10.8 G/DL (ref 11.5–16.5)
TIME ANALYZED: 1830
TIME ANALYZED: 1845
TIME ANALYZED: 2031
VT MECHANICAL: 450 ML
VT MECHANICAL: 450 ML
WBC OTHER # BLD: 18.7 K/UL (ref 4.5–11.5)
WBC OTHER # BLD: 7 K/UL (ref 4.5–11.5)

## 2024-05-30 PROCEDURE — 84100 ASSAY OF PHOSPHORUS: CPT

## 2024-05-30 PROCEDURE — 82962 GLUCOSE BLOOD TEST: CPT

## 2024-05-30 PROCEDURE — 36415 COLL VENOUS BLD VENIPUNCTURE: CPT

## 2024-05-30 PROCEDURE — 83735 ASSAY OF MAGNESIUM: CPT

## 2024-05-30 PROCEDURE — 6360000002 HC RX W HCPCS: Performed by: NURSE PRACTITIONER

## 2024-05-30 PROCEDURE — 2700000000 HC OXYGEN THERAPY PER DAY

## 2024-05-30 PROCEDURE — 6370000000 HC RX 637 (ALT 250 FOR IP)

## 2024-05-30 PROCEDURE — 80048 BASIC METABOLIC PNL TOTAL CA: CPT

## 2024-05-30 PROCEDURE — 6360000002 HC RX W HCPCS

## 2024-05-30 PROCEDURE — 2000000000 HC ICU R&B

## 2024-05-30 PROCEDURE — 2580000003 HC RX 258: Performed by: NURSE PRACTITIONER

## 2024-05-30 PROCEDURE — C9113 INJ PANTOPRAZOLE SODIUM, VIA: HCPCS

## 2024-05-30 PROCEDURE — 71045 X-RAY EXAM CHEST 1 VIEW: CPT

## 2024-05-30 PROCEDURE — 94660 CPAP INITIATION&MGMT: CPT

## 2024-05-30 PROCEDURE — 87081 CULTURE SCREEN ONLY: CPT

## 2024-05-30 PROCEDURE — A4216 STERILE WATER/SALINE, 10 ML: HCPCS

## 2024-05-30 PROCEDURE — 80162 ASSAY OF DIGOXIN TOTAL: CPT

## 2024-05-30 PROCEDURE — 2580000003 HC RX 258

## 2024-05-30 PROCEDURE — 83880 ASSAY OF NATRIURETIC PEPTIDE: CPT

## 2024-05-30 PROCEDURE — 84132 ASSAY OF SERUM POTASSIUM: CPT

## 2024-05-30 PROCEDURE — 84145 PROCALCITONIN (PCT): CPT

## 2024-05-30 PROCEDURE — 85025 COMPLETE CBC W/AUTO DIFF WBC: CPT

## 2024-05-30 PROCEDURE — 82805 BLOOD GASES W/O2 SATURATION: CPT

## 2024-05-30 PROCEDURE — 80053 COMPREHEN METABOLIC PANEL: CPT

## 2024-05-30 PROCEDURE — 94640 AIRWAY INHALATION TREATMENT: CPT

## 2024-05-30 PROCEDURE — 82803 BLOOD GASES ANY COMBINATION: CPT

## 2024-05-30 RX ORDER — ACETAMINOPHEN 325 MG/1
650 TABLET ORAL EVERY 6 HOURS PRN
Status: DISCONTINUED | OUTPATIENT
Start: 2024-05-30 | End: 2024-06-03 | Stop reason: HOSPADM

## 2024-05-30 RX ORDER — ONDANSETRON 4 MG/1
4 TABLET, ORALLY DISINTEGRATING ORAL EVERY 8 HOURS PRN
Status: DISCONTINUED | OUTPATIENT
Start: 2024-05-30 | End: 2024-06-03 | Stop reason: HOSPADM

## 2024-05-30 RX ORDER — ONDANSETRON 2 MG/ML
4 INJECTION INTRAMUSCULAR; INTRAVENOUS EVERY 6 HOURS PRN
Status: DISCONTINUED | OUTPATIENT
Start: 2024-05-30 | End: 2024-06-03 | Stop reason: HOSPADM

## 2024-05-30 RX ORDER — FUROSEMIDE 10 MG/ML
40 INJECTION INTRAMUSCULAR; INTRAVENOUS ONCE
Status: COMPLETED | OUTPATIENT
Start: 2024-05-30 | End: 2024-05-30

## 2024-05-30 RX ORDER — FUROSEMIDE 10 MG/ML
INJECTION INTRAMUSCULAR; INTRAVENOUS
Status: COMPLETED
Start: 2024-05-30 | End: 2024-05-30

## 2024-05-30 RX ORDER — PROPOFOL 10 MG/ML
INJECTION, EMULSION INTRAVENOUS
Status: DISCONTINUED
Start: 2024-05-30 | End: 2024-05-30

## 2024-05-30 RX ORDER — SODIUM CHLORIDE 0.9 % (FLUSH) 0.9 %
5-40 SYRINGE (ML) INJECTION PRN
Status: DISCONTINUED | OUTPATIENT
Start: 2024-05-30 | End: 2024-05-30

## 2024-05-30 RX ORDER — FENTANYL CITRATE 50 UG/ML
25 INJECTION, SOLUTION INTRAMUSCULAR; INTRAVENOUS
Status: DISCONTINUED | OUTPATIENT
Start: 2024-05-30 | End: 2024-06-03 | Stop reason: HOSPADM

## 2024-05-30 RX ORDER — SODIUM CHLORIDE 0.9 % (FLUSH) 0.9 %
5-40 SYRINGE (ML) INJECTION EVERY 12 HOURS SCHEDULED
Status: DISCONTINUED | OUTPATIENT
Start: 2024-05-30 | End: 2024-05-30

## 2024-05-30 RX ORDER — SODIUM CHLORIDE 9 MG/ML
INJECTION, SOLUTION INTRAVENOUS PRN
Status: DISCONTINUED | OUTPATIENT
Start: 2024-05-30 | End: 2024-06-03 | Stop reason: HOSPADM

## 2024-05-30 RX ORDER — ACETAMINOPHEN 650 MG/1
650 SUPPOSITORY RECTAL EVERY 6 HOURS PRN
Status: DISCONTINUED | OUTPATIENT
Start: 2024-05-30 | End: 2024-06-03 | Stop reason: HOSPADM

## 2024-05-30 RX ORDER — POLYETHYLENE GLYCOL 3350 17 G/17G
17 POWDER, FOR SOLUTION ORAL DAILY PRN
Status: DISCONTINUED | OUTPATIENT
Start: 2024-05-30 | End: 2024-06-01

## 2024-05-30 RX ADMIN — PIPERACILLIN AND TAZOBACTAM 4500 MG: 4; .5 INJECTION, POWDER, FOR SOLUTION INTRAVENOUS at 15:04

## 2024-05-30 RX ADMIN — SILDENAFIL 10 MG: 20 TABLET, FILM COATED ORAL at 15:08

## 2024-05-30 RX ADMIN — FERROUS SULFATE TAB 325 MG (65 MG ELEMENTAL FE) 325 MG: 325 (65 FE) TAB at 22:05

## 2024-05-30 RX ADMIN — ACETAMINOPHEN 650 MG: 325 TABLET ORAL at 05:01

## 2024-05-30 RX ADMIN — Medication 1000 UNITS: at 10:57

## 2024-05-30 RX ADMIN — APIXABAN 5 MG: 5 TABLET, FILM COATED ORAL at 10:56

## 2024-05-30 RX ADMIN — DESMOPRESSIN ACETATE 200 MCG: 0.1 TABLET ORAL at 22:05

## 2024-05-30 RX ADMIN — DICLOFENAC SODIUM 2 G: 10 GEL TOPICAL at 11:00

## 2024-05-30 RX ADMIN — TETRAHYDROZOLINE HCL 0.05% 1 DROP: 0.05 SOLUTION/ DROPS INTRAOCULAR at 23:42

## 2024-05-30 RX ADMIN — IPRATROPIUM BROMIDE AND ALBUTEROL SULFATE 1 DOSE: .5; 2.5 SOLUTION RESPIRATORY (INHALATION) at 16:49

## 2024-05-30 RX ADMIN — ARFORMOTEROL TARTRATE 15 MCG: 15 SOLUTION RESPIRATORY (INHALATION) at 19:14

## 2024-05-30 RX ADMIN — PIPERACILLIN AND TAZOBACTAM 3375 MG: 3; .375 INJECTION, POWDER, LYOPHILIZED, FOR SOLUTION INTRAVENOUS at 23:40

## 2024-05-30 RX ADMIN — TETRAHYDROZOLINE HCL 0.05% 1 DROP: 0.05 SOLUTION/ DROPS INTRAOCULAR at 17:46

## 2024-05-30 RX ADMIN — ONDANSETRON 4 MG: 2 INJECTION INTRAMUSCULAR; INTRAVENOUS at 17:43

## 2024-05-30 RX ADMIN — FUROSEMIDE 40 MG: 10 INJECTION INTRAMUSCULAR; INTRAVENOUS at 18:55

## 2024-05-30 RX ADMIN — SODIUM CHLORIDE, PRESERVATIVE FREE 10 ML: 5 INJECTION INTRAVENOUS at 21:35

## 2024-05-30 RX ADMIN — APIXABAN 5 MG: 5 TABLET, FILM COATED ORAL at 21:34

## 2024-05-30 RX ADMIN — ACETAMINOPHEN 650 MG: 325 TABLET ORAL at 21:32

## 2024-05-30 RX ADMIN — OMEGA-3-ACID ETHYL ESTERS 2 G: 1 CAPSULE, LIQUID FILLED ORAL at 10:56

## 2024-05-30 RX ADMIN — FERROUS SULFATE TAB 325 MG (65 MG ELEMENTAL FE) 325 MG: 325 (65 FE) TAB at 10:56

## 2024-05-30 RX ADMIN — FUROSEMIDE 40 MG: 10 INJECTION, SOLUTION INTRAMUSCULAR; INTRAVENOUS at 18:55

## 2024-05-30 RX ADMIN — ARFORMOTEROL TARTRATE 15 MCG: 15 SOLUTION RESPIRATORY (INHALATION) at 08:59

## 2024-05-30 RX ADMIN — BUDESONIDE INHALATION 500 MCG: 0.5 SUSPENSION RESPIRATORY (INHALATION) at 08:59

## 2024-05-30 RX ADMIN — IPRATROPIUM BROMIDE AND ALBUTEROL SULFATE 1 DOSE: .5; 2.5 SOLUTION RESPIRATORY (INHALATION) at 12:07

## 2024-05-30 RX ADMIN — IPRATROPIUM BROMIDE AND ALBUTEROL SULFATE 1 DOSE: .5; 2.5 SOLUTION RESPIRATORY (INHALATION) at 19:14

## 2024-05-30 RX ADMIN — METOPROLOL SUCCINATE 50 MG: 50 TABLET, EXTENDED RELEASE ORAL at 10:57

## 2024-05-30 RX ADMIN — LEVETIRACETAM 500 MG: 500 TABLET, FILM COATED ORAL at 10:57

## 2024-05-30 RX ADMIN — SODIUM CHLORIDE, PRESERVATIVE FREE 40 MG: 5 INJECTION INTRAVENOUS at 21:35

## 2024-05-30 RX ADMIN — DULOXETINE HYDROCHLORIDE 60 MG: 60 CAPSULE, DELAYED RELEASE ORAL at 10:56

## 2024-05-30 RX ADMIN — BUDESONIDE INHALATION 500 MCG: 0.5 SUSPENSION RESPIRATORY (INHALATION) at 19:14

## 2024-05-30 RX ADMIN — VANCOMYCIN HYDROCHLORIDE 1250 MG: 10 INJECTION, POWDER, LYOPHILIZED, FOR SOLUTION INTRAVENOUS at 22:04

## 2024-05-30 RX ADMIN — SODIUM CHLORIDE, PRESERVATIVE FREE 10 ML: 5 INJECTION INTRAVENOUS at 17:42

## 2024-05-30 RX ADMIN — SILDENAFIL 10 MG: 20 TABLET, FILM COATED ORAL at 23:41

## 2024-05-30 RX ADMIN — TETRAHYDROZOLINE HCL 0.05% 1 DROP: 0.05 SOLUTION/ DROPS INTRAOCULAR at 11:00

## 2024-05-30 RX ADMIN — DIGOXIN 62.5 MCG: 0.12 TABLET ORAL at 10:57

## 2024-05-30 RX ADMIN — LEVETIRACETAM 500 MG: 500 TABLET, FILM COATED ORAL at 22:06

## 2024-05-30 RX ADMIN — SODIUM CHLORIDE, PRESERVATIVE FREE 10 ML: 5 INJECTION INTRAVENOUS at 11:00

## 2024-05-30 RX ADMIN — MULTIVITAMIN TABLET 1 TABLET: TABLET at 10:57

## 2024-05-30 RX ADMIN — WATER 40 MG: 1 INJECTION INTRAMUSCULAR; INTRAVENOUS; SUBCUTANEOUS at 22:15

## 2024-05-30 RX ADMIN — LEVOTHYROXINE SODIUM 50 MCG: 0.05 TABLET ORAL at 05:02

## 2024-05-30 RX ADMIN — SILDENAFIL 10 MG: 20 TABLET, FILM COATED ORAL at 10:56

## 2024-05-30 RX ADMIN — HYDROCORTISONE 10 MG: 5 TABLET ORAL at 10:57

## 2024-05-30 RX ADMIN — AMLODIPINE BESYLATE 5 MG: 5 TABLET ORAL at 10:56

## 2024-05-30 RX ADMIN — ACETAMINOPHEN 650 MG: 325 TABLET ORAL at 15:06

## 2024-05-30 RX ADMIN — IPRATROPIUM BROMIDE AND ALBUTEROL SULFATE 1 DOSE: .5; 2.5 SOLUTION RESPIRATORY (INHALATION) at 08:59

## 2024-05-30 RX ADMIN — DESMOPRESSIN ACETATE 200 MCG: 0.1 TABLET ORAL at 10:55

## 2024-05-30 RX ADMIN — DICLOFENAC SODIUM 2 G: 10 GEL TOPICAL at 23:03

## 2024-05-30 RX ADMIN — FUROSEMIDE 20 MG: 20 TABLET ORAL at 10:57

## 2024-05-30 ASSESSMENT — PAIN SCALES - GENERAL
PAINLEVEL_OUTOF10: 3
PAINLEVEL_OUTOF10: 5
PAINLEVEL_OUTOF10: 3
PAINLEVEL_OUTOF10: 3
PAINLEVEL_OUTOF10: 0
PAINLEVEL_OUTOF10: 8

## 2024-05-30 ASSESSMENT — PAIN DESCRIPTION - ORIENTATION
ORIENTATION: RIGHT

## 2024-05-30 ASSESSMENT — PAIN DESCRIPTION - DESCRIPTORS
DESCRIPTORS: ACHING
DESCRIPTORS: ACHING;DISCOMFORT;SORE
DESCRIPTORS: ACHING
DESCRIPTORS: ACHING

## 2024-05-30 ASSESSMENT — PAIN DESCRIPTION - LOCATION
LOCATION: SHOULDER

## 2024-05-30 ASSESSMENT — PAIN - FUNCTIONAL ASSESSMENT: PAIN_FUNCTIONAL_ASSESSMENT: PREVENTS OR INTERFERES SOME ACTIVE ACTIVITIES AND ADLS

## 2024-05-30 ASSESSMENT — PAIN DESCRIPTION - FREQUENCY: FREQUENCY: INTERMITTENT

## 2024-05-30 ASSESSMENT — PAIN DESCRIPTION - PAIN TYPE: TYPE: ACUTE PAIN;CHRONIC PAIN

## 2024-05-30 ASSESSMENT — PAIN DESCRIPTION - ONSET: ONSET: ON-GOING

## 2024-05-30 NOTE — CARE COORDINATION
5/24:  Pt presented to the ER for SOB from home.  Pt is on 14L/NC at 97% & Po Eliquis - old medication.   Pt is in [employer] for VRE.  CM spoke with pt's  Eric Rodriguez at [phone] & dc plan is for the pt to return home & he will transport.  Pt was active with [facility].  PA order placed.  Wolfgang Ricks is pending Ledy device.  Sw/KAREEN will continue.  Electronically signed by Janna Oakley RN on 5/24/2024 at 4:00 PM    
Social Work/ Case Management Transition of Care Planning (Misty Noramn, -[phone]):     Per report and chart review Pt is on 8/L FiO2 50%.  Pt on PO Lasix. SW faxed form to Wolfgang @ [facility] to start auth for Ledy NIV. Nephrology signed off. Pulmonology stated Pt can discharge once NIV is approved. PT 11/24, OT 15/24. Pt's discharge plan is home with [facility]. Pt's  will transport and he will bring portable tank at discharge. ILEANA/KAREEN to follow.  Misty Norman, JIGAR  5/22/2024    
Social Work/ Case Management Transition of Care Planning (Misty Norman, -[phone]):     Per report and chart review Pt is on 7/L FiO2 50%. Pt on PO Lasix.  Pt for Echo today. Pulm and Nephrology following. Paper work from [facility] for home HF/NIV in soft chart for physician to fill out. Pt has . SW made backdoor referral to [facility] with [facility], awaiting call back for appropriate criteria. Pt would like to discharge home with [facility] if possible. Pt's  will transport and bring her portable tank at discharge. ILEANA/KAREEN to follow.  Misty Norman, JIGAR  5/21/2024    
Social Work/ Case Management Transition of Care Planning (Misty Norman, -[phone]):     Per report and chart review Pt is on 6/L and bipap at night. Pt is on IV Lasix. Nephro consulted. PT- Pt declined will re-attempt. Pt for CXR tomorrow. Pending Iron and Urine studies. Pt's discharge plan is home. Pt has home O2 with [employer] and is active with [facility]. Pt will bring portable tank and drive Pt home when medically clear. SW/CM to follow.  Misty Norman, JIGAR  5/14/2024    
Social Work/ Case Management Transition of Care Planning (Misty Norman, -[phone]):     Per report and chart review Pt is on 15/L @ 75%.  ILEANA spoke with Wolfgang at [facility] who stated the auth isn't back yet, he is reaching out to his supervisor to call [facility]. Per Dr. Agarwal Pt may be ok to go home before device is here. [employer] is following. Pt's discharge plan is home with [facility], [place] orders are in. Pt's  will transport and he will bring portable tank at discharge. ILEANA/KAREEN to follow.   Misty Norman, JIGAR  5/29/2024    
Social Work/ Case Management Transition of Care Planning (Misty Norman, -[phone]):       Per report and chart review Pt is on 10/L FiO2 50%.  Pt on PO Lasix. Nephrology signed off. Pulmonology stated Pt can discharge once home NIV device is approved. SW checked with [employer] this morning, precert is still pending on the Ledy device. Pt's discharge plan is home with [facility]. Pt's  will transport and he will bring portable tank at discharge. ILEANA/KAREEN to follow.  Misty Norman, JIGAR  5/23/2024    
Social Work/ Case Management Transition of Care Planning (Misty Norman, -[phone]):     Per report and chart review Pt is on bipap 14/6 FiO2 decreased to 40%. SpO2 99%. Pt to resume lasix PO daily. Creatinine 1.2, BUN 31. Pending Iron studies. . Pt's discharge plan is home. Pt has home O2 with [employer] and is active with [facility]. Pt will bring portable tank and drive Pt home when medically clear. SW/CM to follow.  Misty Norman, JIGAR  5/15/2024    
Social Work/ Case Management Transition of Care Planning (Misty Norman, JIGAR [phone]):     Per report and chart review Pt is on 11/L @ 50% FiO2. Pt on IV Lasix daily. Pt has respiratory cultures pending. Pt for Echo. JULIANNA in room. Pulm consult noted.  Discharge plan is home with [facility]. Pt's  will transport and he will bring portable tank at discharge. SW/CM to follow.  JIGAR Leigh  5/20/2024    
Social Work/ Case Management Transition of Care Planning (Misty Norman, TITAW [phone]):     Per report and chart review Pt is on 11/L HF NC. Pt on PO Lasix daily. Pt's /92. Nephrology signed off.  Discharge plan is home with [facility]. Pt's  will transport and he will bring portable tank at discharge. SW/CM to follow.  JIGAR Leigh  5/17/2024    
Social Work/ Case Management Transition of Care Planning (Misty Norman, TITAW [phone]):     Per report and chart review Pt is on bipap, FiO2 55%. ILEANA spoke with Wolfgang at [facility] still no progress on auth for Ledy devise. ILEANA spoke with Abilio at [facility] who is looking at updated info and will call SW back, there is a concern about Pt's insurance for [employer] approval. ILEANA/ to follow.  JIGAR Leigh  5/30/2024      Update: ILEANA spoke with Pt's  who is in agreement with [employer]. ILEANA spoke with Abilio who is starting precert. ILEANA spoke with Wolfgang at [facility] who stated that they spoke with [facility] and they will have a decision tomorrow about the Ledy device.   JIGAR Leigh  5/30/2024     
Social Work/ Case Management Transition of Care Planning (Mistystaci Norman, -[phone]):     Per report and chart review Pt is on 7/L Heated High Flow NC. Pt is on IV Albumin 2x daily, PO Lasix daily. PT 11/24, OT 15/24. Pt for CXR tomorrow. Per Pulmonology they would like to keep one more day to wean Pt to baseline O2 (per [employer] baseline is 6/L, Pt has a 10/L concentrator). Discharge plan is home with [facility]. Pt's  will transport and he will bring portable tank at discharge. SW/CM to follow.  Misty Norman, JIGAR  5/16/2024    
Code Status: Full Code   Patient's Primary Decision Maker is:      Primary Decision Maker: Eric Rodriguez - Spouse - [phone]    Secondary Decision Maker: ELIER AVILES - Child - [phone]    Discharge Planning:    Patient lives with: Spouse/Significant Other Type of Home: House  Primary Care Giver: Self  Patient Support Systems include: Spouse/Significant Other   Current Financial resources:    Current community resources:    Current services prior to admission: C-pap            Current DME:              Type of Home Care services:  Aide Services, Nursing Services    ADLS  Prior functional level: Assistance with the following:, Bathing, Dressing, Toileting, Cooking, Housework, Shopping, Mobility  Current functional level: Assistance with the following:, Bathing, Dressing, Toileting, Cooking, Housework, Shopping, Mobility    PT AM-PAC:   /24  OT AM-PAC:   /24    Family can provide assistance at DC: Yes  Would you like Case Management to discuss the discharge plan with any other family members/significant others, and if so, who? Yes (Cm spoke with )  Plans to Return to Present Housing: Yes  Other Identified Issues/Barriers to RETURNING to current housing:   Potential Assistance needed at discharge: N/A            Potential DME:    Patient expects to discharge to: Unknown  Plan for transportation at discharge:      Financial    Payor: [facility] MEDICARE / Plan: UNITEDHEALTHCARE DUAL COMPLETE / Product Type: *No Product type* /     Does insurance require precert for SNF: Yes    Potential assistance Purchasing Medications: No  Meds-to-Beds request: Yes      [facility] [place] - [address] - P [phone] - F [phone]  [address] [address]  Phone: [phone] Fax: [phone]      Notes:    Factors facilitating achievement of predicted outcomes: Family support    Barriers to discharge: Cognitive deficit    Additional Case Management Notes:     The Plan for

## 2024-05-30 NOTE — CONSULTS
[facility]   Inpatient CHF Nurse Navigator Consult      Cardiologist: Dr Jose YOUSIF Jennifer is a 68 y.o. (1955) female with a history of HFpEF, most recent EF:  Lab Results   Component Value Date    LVEF 60 08/29/2023 08/29/2023 EF 60%     Patient was awake and alert, laying in bed during the consultation and is agreeable to heart failure education. She was engaged and asked appropriate questions throughout the education session. She cancelled her last clinic appointment in August and did not call to reschedule.     Barriers identified during consult contributing to HF Hospitalization:  [] Limited medication adherence   [] Poor health literacy, education regarding HF medications provided   [] Pill box provided to patient  [] Difficulty affording medications  [] Difficulty obtaining/ managing medications  [] Prescription assistance information given     [] Not weighing themselves daily  [x] Weight log provided for easy monitoring  [] Scale provided     [] Not following low sodium diet  [] Food insecurity   [x] 2 gram sodium diet education provided   [] Low sodium recipes provided  [] Sodium free seasoning provided   [] Low sodium meal delivery options given to patient  [] Dietician consulted     [] Lack of transportation to appointments     [] Depression, given chronic illness  [] Primary team notified     [] Goals of care need addressed  [] Palliative care consulted     [] CHF CHW consulted, to assist with     Chart Reviewed:  Diet: ADULT DIET; Regular; Low Sodium (2 gm); 1500 ml   Daily Weights: Patient Vitals for the past 96 hrs (Last 3 readings):   Weight   05/14/24 0600 64.8 kg (142 lb 12.8 oz)   05/12/24 0011 58.2 kg (128 lb 3.2 oz)   05/11/24 0245 58.3 kg (128 lb 8 oz)     I/O:   Intake/Output Summary (Last 24 hours) at 5/14/2024 1023  Last data filed at 5/14/2024 0636  Gross per 24 hour   Intake 120 ml   Output 300 ml   Net -180 ml       [] Nursing 
    Cecilio Donovan M.D.,[facility]  Shaheed Agudelo D.O., F.YVONNE., [facility]  Joseph Agarwal M.D.  Abby Black M.D.   Efrain Roy D.O.      Patient:  Rebekah Rodriguez 68 y.o. female MRN: [id number]           PULMONARY CONSULTATION    Reason for Consultation: acute hypoxic resp failure  Referring Physician: Kerrie Bae MD    Communication with the referring physician will be sent via the electronic medical record.    Chief Complaint: Arm Pain and Generalized Body Aches     CODE STATUS: FULL CODE    SUBJECTIVE:  HPI:  Rebekah Rodriguez is a 68 y.o.  AA female who we are asked to evaluate for acute hypoxic respiratory failure with extensive pulmonary history.    She is well-known to our service seen during multiple hospital admissions most recently April 2024.     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     Her pulmonary history includes-advanced stage III severe pulmonary arterial hypertension.  She has also followed with [facility] for this.  Based on prior right heart catheterization June 22, 2023 she was found to have combined pre and postcapillary PAH with borderline preserved cardiac index.  Negative nitric oxide response.  Stage IV pulmonary sarcoidosis diagnosed in 1998, moderate Gold stage II COPD on chronic home oxygen now at 6 L nasal cannula, LINDSAY on noninvasive ventilation in the form of CPAP 8 cmH2O pressure.  The PVR had increased from 7.80 POSEY to 9.41 POSEY, while the PAWP 
Department of Internal Medicine  Nephrology Consult Note      Reason for Consult:  IRMA  Requesting Physician:  Kerrie Bae MD     CHIEF COMPLAINT: Bilateral arm pain    History Obtained From:  patient, electronic medical record    HISTORY OF PRESENT ILLNESS:  Briefly Mrs. Rebekah Rodriguez is a 68 y.o. female, well-known to our practice, who follows regularly with Dr. 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  Lab work revealed creatinine 1.5 mg/dL, proBNP 6672, reason for this consultation.    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/2007    Backache     Benign hypertension     CHF (congestive heart failure) ([facility])     Chronic back pain     Chronic kidney disease     stage 3    Chronic pain disorder     Chronic, continuous use of opioids     Compression fracture of thoracic vertebra ([facility])     T10    COPD (chronic obstructive pulmonary disease) ([facility])     Debility     Deformity of ankle and foot, acquired 01/31/2011    Depression     Diabetes insipidus ([facility])     Diverticulitis     Dry eyes     Encephalopathy acute 05/05/2017    Gait disturbance     GERD (gastroesophageal reflux disease)     Hemorrhoids 01/13/2012    Hernia     History of blood transfusion approx 05/2017    History of Clostridium difficile     negative culture 12-15-15; resolved    Hyperlipidemia     Hyperthyroidism     Hypothyroidism     Ischemic colitis, 
no history of alcohol use.      Objective data reviewed: labs, images, records, medication use, vitals, and chart    Discussed patient and the plan of care with the other IDT members: Palliative Medicine IDT Team, Floor Nurse, and Patient    Time/Communication  Greater than 50% of time spent, total 45 minutes in counseling and coordination of care at the bedside regarding goals of care, symptom management, diagnosis and prognosis, and see above.    Thank you for allowing Palliative Medicine to participate in the care of Rebekah Rodriguez.  Nubia GALICIA-CNS, ACHPN    
  Final Result   Worsening interstitial edema in the interval.  No change in the small left   pleural effusion.         XR CHEST PORTABLE   Final Result   1. There is interstitial prominence concerning for pulmonary edema or fluid   overload, slightly improved.   2. Tiny left pleural effusion and/or atelectasis.         XR CHEST PORTABLE   Final Result   1. Cardiomegaly with pulmonary vascular congestion.   2. Bilateral lung base atelectasis.         XR CHEST PORTABLE   Final Result   1. No sign of any definite infiltrate in the retrocardiac left lower lobe.   The appearance of an infiltrate in this region was probably the result of   patient rotation.   2. Stable mild patchy density in the right lung base, probably atelectasis.   3. Stable mild pulmonary fibrosis.   4. 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         XR CHEST PORTABLE   Final Result   1. Dense alveolar infiltrate in the left lung base with small bilateral   pleural effusions.   2. Moderate cardiomegaly.         XR CHEST PORTABLE   Final Result   Bilateral lower lung atelectasis/pneumonia.  Limitations discussed.         CT CHEST W CONTRAST   Final Result   1. No mediastinal or hilar mass.   2. Underlying chronic and emphysematous changes seen within the lung fields.   There is some interseptal thickening and mild ground-glass opacity to suggest   mild interstitial edema.   3. Enlargement of the pulmonary arteries to suggest pulmonary arterial   hypertension.   4. Cardiomegaly.         Vascular duplex upper extremity venous left   Final Result   No evidence of DVT.         US SOFT TISSUE LIMITED AREA   Final Result   1.6 cm x 1.4 cm x 1.2 cm nonvascular complex structure adjacent to the left   AC joint. Correlate with history of prior trauma.      RECOMMENDATION:   MRI may be helpful to further characterize if clinically warranted.         XR CHEST PORTABLE   Final Result   Mild pulmonary edema and small bilateral

## 2024-05-31 ENCOUNTER — APPOINTMENT (OUTPATIENT)
Dept: GENERAL RADIOLOGY | Age: 69
DRG: 208 | End: 2024-05-31
Payer: MEDICARE

## 2024-05-31 LAB
AADO2: 587.7 MMHG
ALBUMIN SERPL-MCNC: 3.5 G/DL (ref 3.5–5.2)
ALP SERPL-CCNC: 121 U/L (ref 35–104)
ALT SERPL-CCNC: 18 U/L (ref 0–32)
ANION GAP SERPL CALCULATED.3IONS-SCNC: 16 MMOL/L (ref 7–16)
AST SERPL-CCNC: 28 U/L (ref 0–31)
B.E.: 5.3 MMOL/L (ref -3–3)
BASOPHILS # BLD: 0 K/UL (ref 0–0.2)
BASOPHILS NFR BLD: 0 % (ref 0–2)
BILIRUB SERPL-MCNC: 1.1 MG/DL (ref 0–1.2)
BUN SERPL-MCNC: 34 MG/DL (ref 6–23)
CALCIUM SERPL-MCNC: 9 MG/DL (ref 8.6–10.2)
CHLORIDE SERPL-SCNC: 98 MMOL/L (ref 98–107)
CO2 SERPL-SCNC: 29 MMOL/L (ref 22–29)
COHB: 0.4 % (ref 0–1.5)
CREAT SERPL-MCNC: 1.3 MG/DL (ref 0.5–1)
CRITICAL: ABNORMAL
DATE ANALYZED: ABNORMAL
DATE LAST DOSE: ABNORMAL
DATE LAST DOSE: NORMAL
DATE OF COLLECTION: ABNORMAL
DIGOXIN DOSE TIME: ABNORMAL
DIGOXIN DOSE: ABNORMAL MG
DIGOXIN SERPL-MCNC: 0.7 NG/ML (ref 0.8–2)
EOSINOPHIL # BLD: 0 K/UL (ref 0.05–0.5)
EOSINOPHILS RELATIVE PERCENT: 0 % (ref 0–6)
ERYTHROCYTE [DISTWIDTH] IN BLOOD BY AUTOMATED COUNT: 17.7 % (ref 11.5–15)
FIO2: 100 %
GFR, ESTIMATED: 46 ML/MIN/1.73M2
GLUCOSE BLD-MCNC: 114 MG/DL (ref 74–99)
GLUCOSE BLD-MCNC: 117 MG/DL (ref 74–99)
GLUCOSE BLD-MCNC: 123 MG/DL (ref 74–99)
GLUCOSE BLD-MCNC: 222 MG/DL (ref 74–99)
GLUCOSE SERPL-MCNC: 90 MG/DL (ref 74–99)
HCO3: 30.5 MMOL/L (ref 22–26)
HCT VFR BLD AUTO: 30.7 % (ref 34–48)
HGB BLD-MCNC: 9.3 G/DL (ref 11.5–15.5)
HHB: 5.7 % (ref 0–5)
L PNEUMO1 AG UR QL IA.RAPID: NEGATIVE
LAB: ABNORMAL
LYMPHOCYTES NFR BLD: 0 K/UL (ref 1.5–4)
LYMPHOCYTES RELATIVE PERCENT: 0 % (ref 20–42)
Lab: 638
MAGNESIUM SERPL-MCNC: 2 MG/DL (ref 1.6–2.6)
MCH RBC QN AUTO: 25.8 PG (ref 26–35)
MCHC RBC AUTO-ENTMCNC: 30.3 G/DL (ref 32–34.5)
MCV RBC AUTO: 85.3 FL (ref 80–99.9)
METHB: 0.4 % (ref 0–1.5)
MODE: ABNORMAL
MONOCYTES NFR BLD: 0.18 K/UL (ref 0.1–0.95)
MONOCYTES NFR BLD: 1 % (ref 2–12)
NEUTROPHILS NFR BLD: 99 % (ref 43–80)
NEUTS SEG NFR BLD: 20.12 K/UL (ref 1.8–7.3)
NUCLEATED RED BLOOD CELLS: 10 PER 100 WBC
O2 CONTENT: 13.5 ML/DL
O2 SATURATION: 94.3 % (ref 92–98.5)
O2HB: 93.5 % (ref 94–97)
OPERATOR ID: 421
PATIENT TEMP: 37 C
PCO2: 47.6 MMHG (ref 35–45)
PEEP/CPAP: 6 CMH2O
PFO2: 0.78 MMHG/%
PH BLOOD GAS: 7.42 (ref 7.35–7.45)
PHOSPHATE SERPL-MCNC: 4.4 MG/DL (ref 2.5–4.5)
PLATELET, FLUORESCENCE: 124 K/UL (ref 130–450)
PMV BLD AUTO: 10.6 FL (ref 7–12)
PO2: 77.7 MMHG (ref 75–100)
POTASSIUM SERPL-SCNC: 4.2 MMOL/L (ref 3.5–5)
PROT SERPL-MCNC: 6.4 G/DL (ref 6.4–8.3)
RBC # BLD AUTO: 3.6 M/UL (ref 3.5–5.5)
RBC # BLD: ABNORMAL 10*6/UL
RI(T): 7.56
RR MECHANICAL: 14 B/MIN
S PNEUM AG SPEC QL: NEGATIVE
SODIUM SERPL-SCNC: 143 MMOL/L (ref 132–146)
SOURCE, BLOOD GAS: ABNORMAL
SPECIMEN SOURCE: NORMAL
THB: 10.2 G/DL (ref 11.5–16.5)
TIME ANALYZED: 653
TME LAST DOSE: NORMAL H
VANCOMYCIN DOSE: NORMAL MG
VANCOMYCIN SERPL-MCNC: 17.1 UG/ML (ref 5–40)
VT MECHANICAL: 450 ML
WBC OTHER # BLD: 20.3 K/UL (ref 4.5–11.5)

## 2024-05-31 PROCEDURE — 6370000000 HC RX 637 (ALT 250 FOR IP)

## 2024-05-31 PROCEDURE — 80053 COMPREHEN METABOLIC PANEL: CPT

## 2024-05-31 PROCEDURE — 87070 CULTURE OTHR SPECIMN AEROBIC: CPT

## 2024-05-31 PROCEDURE — 85025 COMPLETE CBC W/AUTO DIFF WBC: CPT

## 2024-05-31 PROCEDURE — 2580000003 HC RX 258: Performed by: NURSE PRACTITIONER

## 2024-05-31 PROCEDURE — 84100 ASSAY OF PHOSPHORUS: CPT

## 2024-05-31 PROCEDURE — 82805 BLOOD GASES W/O2 SATURATION: CPT

## 2024-05-31 PROCEDURE — 94640 AIRWAY INHALATION TREATMENT: CPT

## 2024-05-31 PROCEDURE — 2000000000 HC ICU R&B

## 2024-05-31 PROCEDURE — 2580000003 HC RX 258

## 2024-05-31 PROCEDURE — 80202 ASSAY OF VANCOMYCIN: CPT

## 2024-05-31 PROCEDURE — C9113 INJ PANTOPRAZOLE SODIUM, VIA: HCPCS

## 2024-05-31 PROCEDURE — 6360000002 HC RX W HCPCS

## 2024-05-31 PROCEDURE — 83735 ASSAY OF MAGNESIUM: CPT

## 2024-05-31 PROCEDURE — 6360000002 HC RX W HCPCS: Performed by: INTERNAL MEDICINE

## 2024-05-31 PROCEDURE — 6360000002 HC RX W HCPCS: Performed by: NURSE PRACTITIONER

## 2024-05-31 PROCEDURE — 87205 SMEAR GRAM STAIN: CPT

## 2024-05-31 PROCEDURE — 94669 MECHANICAL CHEST WALL OSCILL: CPT

## 2024-05-31 PROCEDURE — 2700000000 HC OXYGEN THERAPY PER DAY

## 2024-05-31 PROCEDURE — 87077 CULTURE AEROBIC IDENTIFY: CPT

## 2024-05-31 PROCEDURE — 82962 GLUCOSE BLOOD TEST: CPT

## 2024-05-31 PROCEDURE — 87449 NOS EACH ORGANISM AG IA: CPT

## 2024-05-31 PROCEDURE — 6370000000 HC RX 637 (ALT 250 FOR IP): Performed by: STUDENT IN AN ORGANIZED HEALTH CARE EDUCATION/TRAINING PROGRAM

## 2024-05-31 PROCEDURE — 36415 COLL VENOUS BLD VENIPUNCTURE: CPT

## 2024-05-31 PROCEDURE — 87899 AGENT NOS ASSAY W/OPTIC: CPT

## 2024-05-31 PROCEDURE — A4216 STERILE WATER/SALINE, 10 ML: HCPCS

## 2024-05-31 PROCEDURE — 87040 BLOOD CULTURE FOR BACTERIA: CPT

## 2024-05-31 PROCEDURE — 71045 X-RAY EXAM CHEST 1 VIEW: CPT

## 2024-05-31 PROCEDURE — 51701 INSERT BLADDER CATHETER: CPT

## 2024-05-31 PROCEDURE — 94660 CPAP INITIATION&MGMT: CPT

## 2024-05-31 RX ORDER — ACETYLCYSTEINE 200 MG/ML
600 SOLUTION ORAL; RESPIRATORY (INHALATION) 4 TIMES DAILY
Status: DISCONTINUED | OUTPATIENT
Start: 2024-05-31 | End: 2024-06-02

## 2024-05-31 RX ADMIN — ACETYLCYSTEINE 600 MG: 200 SOLUTION ORAL; RESPIRATORY (INHALATION) at 20:08

## 2024-05-31 RX ADMIN — DULOXETINE HYDROCHLORIDE 60 MG: 60 CAPSULE, DELAYED RELEASE ORAL at 08:11

## 2024-05-31 RX ADMIN — SILDENAFIL 10 MG: 20 TABLET, FILM COATED ORAL at 08:11

## 2024-05-31 RX ADMIN — DESMOPRESSIN ACETATE 200 MCG: 0.1 TABLET ORAL at 20:36

## 2024-05-31 RX ADMIN — FERROUS SULFATE TAB 325 MG (65 MG ELEMENTAL FE) 325 MG: 325 (65 FE) TAB at 08:11

## 2024-05-31 RX ADMIN — IPRATROPIUM BROMIDE AND ALBUTEROL SULFATE 1 DOSE: .5; 2.5 SOLUTION RESPIRATORY (INHALATION) at 08:04

## 2024-05-31 RX ADMIN — PIPERACILLIN AND TAZOBACTAM 3375 MG: 3; .375 INJECTION, POWDER, LYOPHILIZED, FOR SOLUTION INTRAVENOUS at 04:53

## 2024-05-31 RX ADMIN — ARFORMOTEROL TARTRATE 15 MCG: 15 SOLUTION RESPIRATORY (INHALATION) at 20:09

## 2024-05-31 RX ADMIN — PIPERACILLIN AND TAZOBACTAM 3375 MG: 3; .375 INJECTION, POWDER, LYOPHILIZED, FOR SOLUTION INTRAVENOUS at 12:03

## 2024-05-31 RX ADMIN — ARFORMOTEROL TARTRATE 15 MCG: 15 SOLUTION RESPIRATORY (INHALATION) at 08:04

## 2024-05-31 RX ADMIN — TETRAHYDROZOLINE HCL 0.05% 1 DROP: 0.05 SOLUTION/ DROPS INTRAOCULAR at 15:02

## 2024-05-31 RX ADMIN — ACETAMINOPHEN 650 MG: 325 TABLET ORAL at 20:36

## 2024-05-31 RX ADMIN — SODIUM CHLORIDE, PRESERVATIVE FREE 40 MG: 5 INJECTION INTRAVENOUS at 08:14

## 2024-05-31 RX ADMIN — WATER 40 MG: 1 INJECTION INTRAMUSCULAR; INTRAVENOUS; SUBCUTANEOUS at 08:13

## 2024-05-31 RX ADMIN — DIGOXIN 62.5 MCG: 0.12 TABLET ORAL at 08:10

## 2024-05-31 RX ADMIN — FERROUS SULFATE TAB 325 MG (65 MG ELEMENTAL FE) 325 MG: 325 (65 FE) TAB at 20:35

## 2024-05-31 RX ADMIN — LEVETIRACETAM 500 MG: 500 TABLET, FILM COATED ORAL at 08:13

## 2024-05-31 RX ADMIN — DICLOFENAC SODIUM 2 G: 10 GEL TOPICAL at 08:08

## 2024-05-31 RX ADMIN — METOPROLOL SUCCINATE 50 MG: 50 TABLET, EXTENDED RELEASE ORAL at 08:11

## 2024-05-31 RX ADMIN — SODIUM CHLORIDE, PRESERVATIVE FREE 10 ML: 5 INJECTION INTRAVENOUS at 08:14

## 2024-05-31 RX ADMIN — IPRATROPIUM BROMIDE AND ALBUTEROL SULFATE 1 DOSE: .5; 2.5 SOLUTION RESPIRATORY (INHALATION) at 16:44

## 2024-05-31 RX ADMIN — LEVETIRACETAM 500 MG: 500 TABLET, FILM COATED ORAL at 20:35

## 2024-05-31 RX ADMIN — APIXABAN 5 MG: 5 TABLET, FILM COATED ORAL at 08:13

## 2024-05-31 RX ADMIN — TETRAHYDROZOLINE HCL 0.05% 1 DROP: 0.05 SOLUTION/ DROPS INTRAOCULAR at 08:09

## 2024-05-31 RX ADMIN — TETRAHYDROZOLINE HCL 0.05% 1 DROP: 0.05 SOLUTION/ DROPS INTRAOCULAR at 20:37

## 2024-05-31 RX ADMIN — VANCOMYCIN HYDROCHLORIDE 1000 MG: 1 INJECTION, POWDER, LYOPHILIZED, FOR SOLUTION INTRAVENOUS at 10:41

## 2024-05-31 RX ADMIN — PIPERACILLIN AND TAZOBACTAM 3375 MG: 3; .375 INJECTION, POWDER, LYOPHILIZED, FOR SOLUTION INTRAVENOUS at 21:03

## 2024-05-31 RX ADMIN — IPRATROPIUM BROMIDE AND ALBUTEROL SULFATE 1 DOSE: .5; 2.5 SOLUTION RESPIRATORY (INHALATION) at 12:50

## 2024-05-31 RX ADMIN — ACETAMINOPHEN 650 MG: 325 TABLET ORAL at 05:03

## 2024-05-31 RX ADMIN — ACETAMINOPHEN 650 MG: 325 TABLET ORAL at 15:02

## 2024-05-31 RX ADMIN — SILDENAFIL 10 MG: 20 TABLET, FILM COATED ORAL at 15:02

## 2024-05-31 RX ADMIN — DESMOPRESSIN ACETATE 200 MCG: 0.1 TABLET ORAL at 08:09

## 2024-05-31 RX ADMIN — Medication 1000 UNITS: at 08:13

## 2024-05-31 RX ADMIN — APIXABAN 5 MG: 5 TABLET, FILM COATED ORAL at 20:36

## 2024-05-31 RX ADMIN — BUDESONIDE INHALATION 500 MCG: 0.5 SUSPENSION RESPIRATORY (INHALATION) at 08:04

## 2024-05-31 RX ADMIN — OMEGA-3-ACID ETHYL ESTERS 2 G: 1 CAPSULE, LIQUID FILLED ORAL at 20:36

## 2024-05-31 RX ADMIN — LEVOTHYROXINE SODIUM 50 MCG: 0.05 TABLET ORAL at 05:02

## 2024-05-31 RX ADMIN — OMEGA-3-ACID ETHYL ESTERS 2 G: 1 CAPSULE, LIQUID FILLED ORAL at 08:09

## 2024-05-31 RX ADMIN — AMLODIPINE BESYLATE 5 MG: 5 TABLET ORAL at 08:13

## 2024-05-31 RX ADMIN — SODIUM CHLORIDE, PRESERVATIVE FREE 10 ML: 5 INJECTION INTRAVENOUS at 21:03

## 2024-05-31 RX ADMIN — METOPROLOL SUCCINATE 50 MG: 50 TABLET, EXTENDED RELEASE ORAL at 20:35

## 2024-05-31 RX ADMIN — FENTANYL CITRATE 25 MCG: 50 INJECTION INTRAMUSCULAR; INTRAVENOUS at 20:07

## 2024-05-31 RX ADMIN — DICLOFENAC SODIUM 2 G: 10 GEL TOPICAL at 20:38

## 2024-05-31 RX ADMIN — FENTANYL CITRATE 25 MCG: 50 INJECTION INTRAMUSCULAR; INTRAVENOUS at 23:40

## 2024-05-31 RX ADMIN — SILDENAFIL 10 MG: 20 TABLET, FILM COATED ORAL at 20:41

## 2024-05-31 RX ADMIN — MULTIVITAMIN TABLET 1 TABLET: TABLET at 08:13

## 2024-05-31 RX ADMIN — BUDESONIDE INHALATION 500 MCG: 0.5 SUSPENSION RESPIRATORY (INHALATION) at 20:09

## 2024-05-31 RX ADMIN — FUROSEMIDE 20 MG: 20 TABLET ORAL at 08:11

## 2024-05-31 RX ADMIN — IPRATROPIUM BROMIDE AND ALBUTEROL SULFATE 1 DOSE: .5; 2.5 SOLUTION RESPIRATORY (INHALATION) at 20:09

## 2024-05-31 RX ADMIN — ACETYLCYSTEINE 600 MG: 200 SOLUTION ORAL; RESPIRATORY (INHALATION) at 17:20

## 2024-05-31 ASSESSMENT — PAIN SCALES - GENERAL
PAINLEVEL_OUTOF10: 0
PAINLEVEL_OUTOF10: 3
PAINLEVEL_OUTOF10: 6
PAINLEVEL_OUTOF10: 7
PAINLEVEL_OUTOF10: 0

## 2024-05-31 ASSESSMENT — PAIN SCALES - WONG BAKER
WONGBAKER_NUMERICALRESPONSE: NO HURT

## 2024-05-31 ASSESSMENT — PAIN DESCRIPTION - ORIENTATION
ORIENTATION: RIGHT

## 2024-05-31 ASSESSMENT — PAIN DESCRIPTION - LOCATION
LOCATION: SHOULDER

## 2024-05-31 ASSESSMENT — PAIN DESCRIPTION - DESCRIPTORS
DESCRIPTORS: DISCOMFORT;ACHING;SORE
DESCRIPTORS: ACHING
DESCRIPTORS: ACHING

## 2024-05-31 NOTE — SIGNIFICANT EVENT
Rapid Response Team Note  Date of event: 5/30/2024   Time of event: 18:16 PM  Rebekah Rodriguez 68 y.o. year old female   YOB: 1955   Admit date:  5/10/2024   Location: [facility]/4429-A   Witnessed? : [x]Yes  [] No  Monitored? : [x]Yes  [] No  Code status: [x] Full  [] DNR-CCA  []DNR-CC  ______________________________________________________________________  Reason for RRT:    [] RR < 8     [] RR > 28   [] SpO2 <90%   [] HR < 40 bpm   [] HR > 130 bpm  [] SBP < 90 mmHg    [x] SpO2 <90%   [] LOC   [] Seizures    [] Significant Bleeding Event        Subjective:   RRT was called for acute respiratory failure.  On arrival, patient was alert and oriented, noted in labored breathing.  She was on Airvo, with saturation 81%.  ABG and chest x-ray was ordered, ABG showed 7.47/49/51/34.9. CXR showed Bilateral pulmonary opacities worrisome for pneumonia.  Small left pleural effusion.  Patient was transferred to MICU due to low threshold  for intubation.     Objective:   Vital signs: BP: 123/78 RR: 32 HR: 102  Initial Condition:  Conscious   [x] Yes  [] No     Breathing [x] Yes  [] No     Pulse  [x] Yes  [] No    Airway:   [x] Open/ Clear     Intervention: [x] None  [] Pooled secretions     [] Suctioned  [] Stridor      [] Intubation    Lungs:   [] Symmetrical chest rise/ CTABL Intervention: [] None  [] Use of accessory muscles    [x] NIV (CPAP/BiPAP)  [] Cyanosis      [] Nasal Oxygen/Mask  [x] Wheezing       [x] ABG             [x] CXR      Diagnostic Test:  Blood Sugar:  96 mg/dL  ABG:      Lab Results   Component Value Date/Time    PH 7.456 05/30/2024 06:45 PM    PCO2 51.3 05/30/2024 06:45 PM    PO2 56.8 05/30/2024 06:45 PM    HCO3 35.3 05/30/2024 06:45 PM    O2SAT 87.4 05/30/2024 06:45 PM         Imaging:   [x] CXR:   [] Normal         [] Pneumothorax         [x] Pulmonary Edema  [x] Infiltrate       Laboratory Tests:   CBC:   Lab Results   Component Value Date/Time    WBC 7.0 05/30/2024 04:52 AM    RBC 3.60

## 2024-06-01 ENCOUNTER — APPOINTMENT (OUTPATIENT)
Dept: ULTRASOUND IMAGING | Age: 69
DRG: 208 | End: 2024-06-01
Payer: MEDICARE

## 2024-06-01 ENCOUNTER — APPOINTMENT (OUTPATIENT)
Dept: GENERAL RADIOLOGY | Age: 69
DRG: 208 | End: 2024-06-01
Payer: MEDICARE

## 2024-06-01 ENCOUNTER — ANESTHESIA (OUTPATIENT)
Dept: ICU | Age: 69
End: 2024-06-01
Payer: MEDICARE

## 2024-06-01 ENCOUNTER — ANESTHESIA EVENT (OUTPATIENT)
Dept: ICU | Age: 69
End: 2024-06-01
Payer: MEDICARE

## 2024-06-01 ENCOUNTER — APPOINTMENT (OUTPATIENT)
Age: 69
DRG: 208 | End: 2024-06-01
Payer: MEDICARE

## 2024-06-01 LAB
AADO2: 586.4 MMHG
AADO2: 590 MMHG
AADO2: 604.4 MMHG
AADO2: 612.2 MMHG
ALBUMIN SERPL-MCNC: 3.2 G/DL (ref 3.5–5.2)
ALP SERPL-CCNC: 130 U/L (ref 35–104)
ALT SERPL-CCNC: 112 U/L (ref 0–32)
ANION GAP SERPL CALCULATED.3IONS-SCNC: 14 MMOL/L (ref 7–16)
ANION GAP SERPL CALCULATED.3IONS-SCNC: 22 MMOL/L (ref 7–16)
ANION GAP SERPL CALCULATED.3IONS-SCNC: 25 MMOL/L (ref 7–16)
AST SERPL-CCNC: 240 U/L (ref 0–31)
B.E.: -4.2 MMOL/L (ref -3–3)
B.E.: -6.3 MMOL/L (ref -3–3)
B.E.: -7.5 MMOL/L (ref -3–3)
B.E.: 4.9 MMOL/L (ref -3–3)
BASOPHILS # BLD: 0 K/UL (ref 0–0.2)
BASOPHILS # BLD: 0 K/UL (ref 0–0.2)
BASOPHILS NFR BLD: 0 % (ref 0–2)
BASOPHILS NFR BLD: 0 % (ref 0–2)
BILIRUB SERPL-MCNC: 1.2 MG/DL (ref 0–1.2)
BNP SERPL-MCNC: ABNORMAL PG/ML (ref 0–125)
BUN SERPL-MCNC: 51 MG/DL (ref 6–23)
BUN SERPL-MCNC: 55 MG/DL (ref 6–23)
BUN SERPL-MCNC: 58 MG/DL (ref 6–23)
CALCIUM SERPL-MCNC: 8.5 MG/DL (ref 8.6–10.2)
CALCIUM SERPL-MCNC: 8.9 MG/DL (ref 8.6–10.2)
CALCIUM SERPL-MCNC: 9.2 MG/DL (ref 8.6–10.2)
CHLORIDE SERPL-SCNC: 100 MMOL/L (ref 98–107)
CHLORIDE SERPL-SCNC: 97 MMOL/L (ref 98–107)
CHLORIDE SERPL-SCNC: 99 MMOL/L (ref 98–107)
CLOT ANGLE.KAOLIN INDUCED BLD RES TEG: 74.9 DEG (ref 53–70)
CO2 SERPL-SCNC: 21 MMOL/L (ref 22–29)
CO2 SERPL-SCNC: 22 MMOL/L (ref 22–29)
CO2 SERPL-SCNC: 29 MMOL/L (ref 22–29)
COHB: 0.2 % (ref 0–1.5)
COHB: 0.5 % (ref 0–1.5)
COHB: 0.5 % (ref 0–1.5)
COHB: 0.6 % (ref 0–1.5)
CREAT SERPL-MCNC: 1.8 MG/DL (ref 0.5–1)
CREAT SERPL-MCNC: 1.9 MG/DL (ref 0.5–1)
CREAT SERPL-MCNC: 1.9 MG/DL (ref 0.5–1)
CRITICAL: ABNORMAL
D-DIMER QUANTITATIVE: 3155 NG/ML DDU (ref 0–230)
DATE ANALYZED: ABNORMAL
DATE LAST DOSE: ABNORMAL
DATE LAST DOSE: NORMAL
DATE OF COLLECTION: ABNORMAL
DIGOXIN DOSE TIME: ABNORMAL
DIGOXIN DOSE: ABNORMAL MG
DIGOXIN SERPL-MCNC: 7.4 NG/ML (ref 0.8–2)
ECHO AV AREA PEAK VELOCITY: 1.8 CM2
ECHO AV AREA VTI: 1.7 CM2
ECHO AV AREA/BSA PEAK VELOCITY: 1.1 CM2/M2
ECHO AV AREA/BSA VTI: 1 CM2/M2
ECHO AV CUSP MM: 1.8 CM
ECHO AV MEAN GRADIENT: 4 MMHG
ECHO AV MEAN VELOCITY: 0.9 M/S
ECHO AV PEAK GRADIENT: 12 MMHG
ECHO AV PEAK VELOCITY: 1.7 M/S
ECHO AV VELOCITY RATIO: 0.71
ECHO AV VTI: 25.6 CM
ECHO BSA: 1.57 M2
ECHO EST RA PRESSURE: 15 MMHG
ECHO LA DIAMETER INDEX: 2.64 CM/M2
ECHO LA DIAMETER: 4.3 CM
ECHO LA VOL A-L A2C: 54 ML (ref 22–52)
ECHO LA VOL A-L A4C: 49 ML (ref 22–52)
ECHO LA VOL MOD A2C: 51 ML (ref 22–52)
ECHO LA VOL MOD A4C: 45 ML (ref 22–52)
ECHO LA VOLUME AREA LENGTH: 55 ML
ECHO LA VOLUME INDEX A-L A2C: 33 ML/M2 (ref 16–34)
ECHO LA VOLUME INDEX A-L A4C: 30 ML/M2 (ref 16–34)
ECHO LA VOLUME INDEX AREA LENGTH: 34 ML/M2 (ref 16–34)
ECHO LA VOLUME INDEX MOD A2C: 31 ML/M2 (ref 16–34)
ECHO LA VOLUME INDEX MOD A4C: 28 ML/M2 (ref 16–34)
ECHO LV EF PHYSICIAN: 75 %
ECHO LV FRACTIONAL SHORTENING: 45 % (ref 28–44)
ECHO LV INTERNAL DIMENSION DIASTOLE INDEX: 2.02 CM/M2
ECHO LV INTERNAL DIMENSION DIASTOLIC: 3.3 CM (ref 3.9–5.3)
ECHO LV INTERNAL DIMENSION SYSTOLIC INDEX: 1.1 CM/M2
ECHO LV INTERNAL DIMENSION SYSTOLIC: 1.8 CM
ECHO LV IVSD: 0.9 CM (ref 0.6–0.9)
ECHO LV IVSS: 1.2 CM
ECHO LV MASS 2D: 81.1 G (ref 67–162)
ECHO LV MASS INDEX 2D: 49.7 G/M2 (ref 43–95)
ECHO LV POSTERIOR WALL DIASTOLIC: 0.9 CM (ref 0.6–0.9)
ECHO LV POSTERIOR WALL SYSTOLIC: 1.2 CM
ECHO LV RELATIVE WALL THICKNESS RATIO: 0.55
ECHO LVOT AREA: 2.8 CM2
ECHO LVOT AV VTI INDEX: 0.63
ECHO LVOT DIAM: 1.9 CM
ECHO LVOT MEAN GRADIENT: 2 MMHG
ECHO LVOT PEAK GRADIENT: 5 MMHG
ECHO LVOT PEAK VELOCITY: 1.2 M/S
ECHO LVOT STROKE VOLUME INDEX: 28.2 ML/M2
ECHO LVOT SV: 45.9 ML
ECHO LVOT VTI: 16.2 CM
ECHO MV A VELOCITY: 0.61 M/S
ECHO MV AREA PHT: 3.6 CM2
ECHO MV AREA VTI: 1.9 CM2
ECHO MV E DECELERATION TIME (DT): 256.5 MS
ECHO MV E VELOCITY: 0.57 M/S
ECHO MV E/A RATIO: 0.93
ECHO MV LVOT VTI INDEX: 1.48
ECHO MV MAX VELOCITY: 0.8 M/S
ECHO MV MEAN GRADIENT: 1 MMHG
ECHO MV MEAN VELOCITY: 0.5 M/S
ECHO MV PEAK GRADIENT: 3 MMHG
ECHO MV PRESSURE HALF TIME (PHT): 60.3 MS
ECHO MV VTI: 23.9 CM
ECHO PULMONARY ARTERY END DIASTOLIC PRESSURE: 17 MMHG
ECHO PV MAX VELOCITY: 0.9 M/S
ECHO PV MEAN GRADIENT: 1 MMHG
ECHO PV MEAN VELOCITY: 0.5 M/S
ECHO PV PEAK GRADIENT: 3 MMHG
ECHO PV REGURGITANT MAX VELOCITY: 2.1 M/S
ECHO PV VTI: 13.9 CM
ECHO RIGHT VENTRICULAR SYSTOLIC PRESSURE (RVSP): 91 MMHG
ECHO RV INTERNAL DIMENSION: 3.7 CM
ECHO RVOT MEAN GRADIENT: 1 MMHG
ECHO RVOT PEAK GRADIENT: 2 MMHG
ECHO RVOT PEAK VELOCITY: 0.7 M/S
ECHO RVOT VTI: 11.6 CM
ECHO TV REGURGITANT MAX VELOCITY: 4.37 M/S
ECHO TV REGURGITANT PEAK GRADIENT: 76 MMHG
EOSINOPHIL # BLD: 0 K/UL (ref 0.05–0.5)
EOSINOPHIL # BLD: 0 K/UL (ref 0.05–0.5)
EOSINOPHILS RELATIVE PERCENT: 0 % (ref 0–6)
EOSINOPHILS RELATIVE PERCENT: 0 % (ref 0–6)
EPL-TEG: 0 % (ref 0–15)
ERYTHROCYTE [DISTWIDTH] IN BLOOD BY AUTOMATED COUNT: 17.6 % (ref 11.5–15)
ERYTHROCYTE [DISTWIDTH] IN BLOOD BY AUTOMATED COUNT: 17.6 % (ref 11.5–15)
FIBRINOGEN PPP-MCNC: 488 MG/DL (ref 200–400)
FIO2: 100 %
G-TEG: 14.2 KDYN/CM2 (ref 4.5–11)
GFR, ESTIMATED: 28 ML/MIN/1.73M2
GFR, ESTIMATED: 28 ML/MIN/1.73M2
GFR, ESTIMATED: 30 ML/MIN/1.73M2
GLUCOSE BLD-MCNC: 137 MG/DL (ref 74–99)
GLUCOSE BLD-MCNC: 188 MG/DL (ref 74–99)
GLUCOSE BLD-MCNC: 198 MG/DL (ref 74–99)
GLUCOSE BLD-MCNC: 213 MG/DL (ref 74–99)
GLUCOSE SERPL-MCNC: 133 MG/DL (ref 74–99)
GLUCOSE SERPL-MCNC: 137 MG/DL (ref 74–99)
GLUCOSE SERPL-MCNC: 145 MG/DL (ref 74–99)
HCO3: 20.1 MMOL/L (ref 22–26)
HCO3: 22.5 MMOL/L (ref 22–26)
HCO3: 24.5 MMOL/L (ref 22–26)
HCO3: 31.2 MMOL/L (ref 22–26)
HCT VFR BLD AUTO: 28.3 % (ref 34–48)
HCT VFR BLD AUTO: 28.6 % (ref 34–48)
HGB BLD-MCNC: 8.7 G/DL (ref 11.5–15.5)
HGB BLD-MCNC: 8.9 G/DL (ref 11.5–15.5)
HHB: 16.8 % (ref 0–5)
HHB: 21.4 % (ref 0–5)
HHB: 24.2 % (ref 0–5)
HHB: 46.6 % (ref 0–5)
INR PPP: 2.5
KINETICS TEG: 1.1 MIN (ref 1–3)
LAB: ABNORMAL
LACTATE BLDV-SCNC: 3.5 MMOL/L (ref 0.5–2.2)
LACTATE BLDV-SCNC: 3.6 MMOL/L (ref 0.5–2.2)
LACTATE BLDV-SCNC: 3.6 MMOL/L (ref 0.5–2.2)
LACTATE BLDV-SCNC: 3.7 MMOL/L (ref 0.5–2.2)
LACTATE BLDV-SCNC: 7.3 MMOL/L (ref 0.5–2.2)
LACTATE BLDV-SCNC: 9 MMOL/L (ref 0.5–2.2)
LY30 (LYSIS) TEG: 0 % (ref 0–8)
LYMPHOCYTES NFR BLD: 0 K/UL (ref 1.5–4)
LYMPHOCYTES NFR BLD: 0 K/UL (ref 1.5–4)
LYMPHOCYTES RELATIVE PERCENT: 0 % (ref 20–42)
LYMPHOCYTES RELATIVE PERCENT: 0 % (ref 20–42)
Lab: 125
Lab: 148
Lab: 26
Lab: 440
MA (MAX CLOT) TEG: 74 MM (ref 50–70)
MAGNESIUM SERPL-MCNC: 3.4 MG/DL (ref 1.6–2.6)
MAGNESIUM SERPL-MCNC: 3.5 MG/DL (ref 1.6–2.6)
MAGNESIUM SERPL-MCNC: 3.9 MG/DL (ref 1.6–2.6)
MCH RBC QN AUTO: 26.2 PG (ref 26–35)
MCH RBC QN AUTO: 26.3 PG (ref 26–35)
MCHC RBC AUTO-ENTMCNC: 30.4 G/DL (ref 32–34.5)
MCHC RBC AUTO-ENTMCNC: 31.4 G/DL (ref 32–34.5)
MCV RBC AUTO: 83.2 FL (ref 80–99.9)
MCV RBC AUTO: 86.4 FL (ref 80–99.9)
METHB: 0.2 % (ref 0–1.5)
METHB: 0.3 % (ref 0–1.5)
METHB: 0.3 % (ref 0–1.5)
METHB: 0.4 % (ref 0–1.5)
MICROORGANISM SPEC CULT: NORMAL
MODE: ABNORMAL
MODE: ABNORMAL
MODE: AC
MODE: AC
MONOCYTES NFR BLD: 0.4 K/UL (ref 0.1–0.95)
MONOCYTES NFR BLD: 0.93 K/UL (ref 0.1–0.95)
MONOCYTES NFR BLD: 2 % (ref 2–12)
MONOCYTES NFR BLD: 4 % (ref 2–12)
NEUTROPHILS NFR BLD: 96 % (ref 43–80)
NEUTROPHILS NFR BLD: 98 % (ref 43–80)
NEUTS SEG NFR BLD: 20.37 K/UL (ref 1.8–7.3)
NEUTS SEG NFR BLD: 22.2 K/UL (ref 1.8–7.3)
O2 CONTENT: 11.1 ML/DL
O2 CONTENT: 11.7 ML/DL
O2 CONTENT: 7.5 ML/DL
O2 SATURATION: 53 % (ref 92–98.5)
O2 SATURATION: 75.6 % (ref 92–98.5)
O2 SATURATION: 78.5 % (ref 92–98.5)
O2 SATURATION: 83.1 % (ref 92–98.5)
O2HB: 52.6 % (ref 94–97)
O2HB: 75 % (ref 94–97)
O2HB: 78 % (ref 94–97)
O2HB: 82.4 % (ref 94–97)
OPERATOR ID: 421
OPERATOR ID: 421
OPERATOR ID: ABNORMAL
OPERATOR ID: ABNORMAL
PARTIAL THROMBOPLASTIN TIME: 28.6 SEC (ref 24.5–35.1)
PATIENT TEMP: 37 C
PCO2: 50.5 MMHG (ref 35–45)
PCO2: 55.2 MMHG (ref 35–45)
PCO2: 62.3 MMHG (ref 35–45)
PCO2: 65.1 MMHG (ref 35–45)
PEEP/CPAP: 10 CMH2O
PEEP/CPAP: 8 CMH2O
PEEP/CPAP: 8 CMH2O
PFO2: 0.38 MMHG/%
PFO2: 0.51 MMHG/%
PFO2: 0.53 MMHG/%
PFO2: 0.58 MMHG/%
PH BLOOD GAS: 7.17 (ref 7.35–7.45)
PH BLOOD GAS: 7.19 (ref 7.35–7.45)
PH BLOOD GAS: 7.22 (ref 7.35–7.45)
PH BLOOD GAS: 7.37 (ref 7.35–7.45)
PHOSPHATE SERPL-MCNC: 4.7 MG/DL (ref 2.5–4.5)
PHOSPHATE SERPL-MCNC: 6.5 MG/DL (ref 2.5–4.5)
PHOSPHATE SERPL-MCNC: 7.5 MG/DL (ref 2.5–4.5)
PLATELET # BLD AUTO: 154 K/UL (ref 130–450)
PLATELET # BLD AUTO: 155 K/UL (ref 130–450)
PMV BLD AUTO: 11.2 FL (ref 7–12)
PMV BLD AUTO: 11.4 FL (ref 7–12)
PO2: 38.5 MMHG (ref 75–100)
PO2: 51.1 MMHG (ref 75–100)
PO2: 53.4 MMHG (ref 75–100)
PO2: 57.9 MMHG (ref 75–100)
POTASSIUM SERPL-SCNC: 3.5 MMOL/L (ref 3.5–5)
POTASSIUM SERPL-SCNC: 3.6 MMOL/L (ref 3.5–5)
POTASSIUM SERPL-SCNC: 3.8 MMOL/L (ref 3.5–5)
POTASSIUM SERPL-SCNC: 4 MMOL/L (ref 3.5–5)
POTASSIUM SERPL-SCNC: 5.82 MMOL/L (ref 3.5–5)
PROT SERPL-MCNC: 5.8 G/DL (ref 6.4–8.3)
PROTHROMBIN TIME: 27.3 SEC (ref 9.3–12.4)
RBC # BLD AUTO: 3.31 M/UL (ref 3.5–5.5)
RBC # BLD AUTO: 3.4 M/UL (ref 3.5–5.5)
RBC # BLD: ABNORMAL 10*6/UL
REACTION TIME TEG: 7.2 MIN (ref 5–10)
RI(T): 10.19
RI(T): 11.32
RI(T): 11.48
RI(T): 15.9
RR MECHANICAL: 14 B/MIN
RR MECHANICAL: 14 B/MIN
RR MECHANICAL: 20 B/MIN
SODIUM SERPL-SCNC: 143 MMOL/L (ref 132–146)
SOURCE, BLOOD GAS: ABNORMAL
SPECIMEN DESCRIPTION: NORMAL
THB: 10.1 G/DL (ref 11.5–16.5)
THB: 10.2 G/DL (ref 11.5–16.5)
TIME ANALYZED: 126
TIME ANALYZED: 151
TIME ANALYZED: 27
TIME ANALYZED: 445
TME LAST DOSE: NORMAL H
TROPONIN I SERPL HS-MCNC: 45 NG/L (ref 0–9)
TSH SERPL DL<=0.05 MIU/L-ACNC: 0.44 UIU/ML (ref 0.27–4.2)
VANCOMYCIN DOSE: NORMAL MG
VANCOMYCIN SERPL-MCNC: 16.7 UG/ML (ref 5–40)
VT MECHANICAL: 300 ML
VT MECHANICAL: 300 ML
VT MECHANICAL: 450 ML
WBC OTHER # BLD: 21.3 K/UL (ref 4.5–11.5)
WBC OTHER # BLD: 22.6 K/UL (ref 4.5–11.5)

## 2024-06-01 PROCEDURE — 6360000002 HC RX W HCPCS

## 2024-06-01 PROCEDURE — 2580000003 HC RX 258: Performed by: STUDENT IN AN ORGANIZED HEALTH CARE EDUCATION/TRAINING PROGRAM

## 2024-06-01 PROCEDURE — 85730 THROMBOPLASTIN TIME PARTIAL: CPT

## 2024-06-01 PROCEDURE — 82962 GLUCOSE BLOOD TEST: CPT

## 2024-06-01 PROCEDURE — 85347 COAGULATION TIME ACTIVATED: CPT

## 2024-06-01 PROCEDURE — 6370000000 HC RX 637 (ALT 250 FOR IP)

## 2024-06-01 PROCEDURE — 94002 VENT MGMT INPAT INIT DAY: CPT

## 2024-06-01 PROCEDURE — 71045 X-RAY EXAM CHEST 1 VIEW: CPT

## 2024-06-01 PROCEDURE — 84100 ASSAY OF PHOSPHORUS: CPT

## 2024-06-01 PROCEDURE — 2500000003 HC RX 250 WO HCPCS: Performed by: STUDENT IN AN ORGANIZED HEALTH CARE EDUCATION/TRAINING PROGRAM

## 2024-06-01 PROCEDURE — 5A1945Z RESPIRATORY VENTILATION, 24-96 CONSECUTIVE HOURS: ICD-10-PCS | Performed by: RADIOLOGY

## 2024-06-01 PROCEDURE — 2580000003 HC RX 258

## 2024-06-01 PROCEDURE — C9113 INJ PANTOPRAZOLE SODIUM, VIA: HCPCS

## 2024-06-01 PROCEDURE — 6360000002 HC RX W HCPCS: Performed by: NURSE PRACTITIONER

## 2024-06-01 PROCEDURE — 6360000002 HC RX W HCPCS: Performed by: INTERNAL MEDICINE

## 2024-06-01 PROCEDURE — 36556 INSERT NON-TUNNEL CV CATH: CPT

## 2024-06-01 PROCEDURE — 5A12012 PERFORMANCE OF CARDIAC OUTPUT, SINGLE, MANUAL: ICD-10-PCS | Performed by: FAMILY MEDICINE

## 2024-06-01 PROCEDURE — 2500000003 HC RX 250 WO HCPCS: Performed by: CHIROPRACTOR

## 2024-06-01 PROCEDURE — 80162 ASSAY OF DIGOXIN TOTAL: CPT

## 2024-06-01 PROCEDURE — 2500000003 HC RX 250 WO HCPCS

## 2024-06-01 PROCEDURE — 83880 ASSAY OF NATRIURETIC PEPTIDE: CPT

## 2024-06-01 PROCEDURE — 2000000000 HC ICU R&B

## 2024-06-01 PROCEDURE — 02HV33Z INSERTION OF INFUSION DEVICE INTO SUPERIOR VENA CAVA, PERCUTANEOUS APPROACH: ICD-10-PCS | Performed by: INTERNAL MEDICINE

## 2024-06-01 PROCEDURE — 86317 IMMUNOASSAY INFECTIOUS AGENT: CPT

## 2024-06-01 PROCEDURE — 31500 INSERT EMERGENCY AIRWAY: CPT | Performed by: NURSE ANESTHETIST, CERTIFIED REGISTERED

## 2024-06-01 PROCEDURE — 87449 NOS EACH ORGANISM AG IA: CPT

## 2024-06-01 PROCEDURE — 6360000004 HC RX CONTRAST MEDICATION

## 2024-06-01 PROCEDURE — 85384 FIBRINOGEN ACTIVITY: CPT

## 2024-06-01 PROCEDURE — 93970 EXTREMITY STUDY: CPT

## 2024-06-01 PROCEDURE — 6360000002 HC RX W HCPCS: Performed by: STUDENT IN AN ORGANIZED HEALTH CARE EDUCATION/TRAINING PROGRAM

## 2024-06-01 PROCEDURE — 85379 FIBRIN DEGRADATION QUANT: CPT

## 2024-06-01 PROCEDURE — 6360000002 HC RX W HCPCS: Performed by: CHIROPRACTOR

## 2024-06-01 PROCEDURE — 85610 PROTHROMBIN TIME: CPT

## 2024-06-01 PROCEDURE — 83735 ASSAY OF MAGNESIUM: CPT

## 2024-06-01 PROCEDURE — 80048 BASIC METABOLIC PNL TOTAL CA: CPT

## 2024-06-01 PROCEDURE — 03HY32Z INSERTION OF MONITORING DEVICE INTO UPPER ARTERY, PERCUTANEOUS APPROACH: ICD-10-PCS | Performed by: INTERNAL MEDICINE

## 2024-06-01 PROCEDURE — 94645 CONT INHLJ TX EACH ADDL HOUR: CPT

## 2024-06-01 PROCEDURE — 74018 RADEX ABDOMEN 1 VIEW: CPT

## 2024-06-01 PROCEDURE — 51702 INSERT TEMP BLADDER CATH: CPT

## 2024-06-01 PROCEDURE — 2580000003 HC RX 258: Performed by: CHIROPRACTOR

## 2024-06-01 PROCEDURE — 84132 ASSAY OF SERUM POTASSIUM: CPT

## 2024-06-01 PROCEDURE — 85390 FIBRINOLYSINS SCREEN I&R: CPT

## 2024-06-01 PROCEDURE — 0BH17EZ INSERTION OF ENDOTRACHEAL AIRWAY INTO TRACHEA, VIA NATURAL OR ARTIFICIAL OPENING: ICD-10-PCS | Performed by: FAMILY MEDICINE

## 2024-06-01 PROCEDURE — 31500 INSERT EMERGENCY AIRWAY: CPT

## 2024-06-01 PROCEDURE — 80053 COMPREHEN METABOLIC PANEL: CPT

## 2024-06-01 PROCEDURE — 2580000003 HC RX 258: Performed by: INTERNAL MEDICINE

## 2024-06-01 PROCEDURE — 85576 BLOOD PLATELET AGGREGATION: CPT

## 2024-06-01 PROCEDURE — 94644 CONT INHLJ TX 1ST HOUR: CPT

## 2024-06-01 PROCEDURE — 2580000003 HC RX 258: Performed by: NURSE PRACTITIONER

## 2024-06-01 PROCEDURE — P9047 ALBUMIN (HUMAN), 25%, 50ML: HCPCS | Performed by: INTERNAL MEDICINE

## 2024-06-01 PROCEDURE — 6370000000 HC RX 637 (ALT 250 FOR IP): Performed by: STUDENT IN AN ORGANIZED HEALTH CARE EDUCATION/TRAINING PROGRAM

## 2024-06-01 PROCEDURE — 85025 COMPLETE CBC W/AUTO DIFF WBC: CPT

## 2024-06-01 PROCEDURE — 93005 ELECTROCARDIOGRAM TRACING: CPT

## 2024-06-01 PROCEDURE — 82805 BLOOD GASES W/O2 SATURATION: CPT

## 2024-06-01 PROCEDURE — 94660 CPAP INITIATION&MGMT: CPT

## 2024-06-01 PROCEDURE — 36415 COLL VENOUS BLD VENIPUNCTURE: CPT

## 2024-06-01 PROCEDURE — 94640 AIRWAY INHALATION TREATMENT: CPT

## 2024-06-01 PROCEDURE — 36620 INSERTION CATHETER ARTERY: CPT

## 2024-06-01 PROCEDURE — 84484 ASSAY OF TROPONIN QUANT: CPT

## 2024-06-01 PROCEDURE — 84443 ASSAY THYROID STIM HORMONE: CPT

## 2024-06-01 PROCEDURE — C8929 TTE W OR WO FOL WCON,DOPPLER: HCPCS

## 2024-06-01 PROCEDURE — 80202 ASSAY OF VANCOMYCIN: CPT

## 2024-06-01 PROCEDURE — 83605 ASSAY OF LACTIC ACID: CPT

## 2024-06-01 RX ORDER — FLUCONAZOLE 2 MG/ML
400 INJECTION, SOLUTION INTRAVENOUS EVERY 24 HOURS
Status: DISCONTINUED | OUTPATIENT
Start: 2024-06-01 | End: 2024-06-03 | Stop reason: HOSPADM

## 2024-06-01 RX ORDER — NOREPINEPHRINE BITARTRATE 0.06 MG/ML
1-100 INJECTION, SOLUTION INTRAVENOUS CONTINUOUS
Status: DISCONTINUED | OUTPATIENT
Start: 2024-06-01 | End: 2024-06-03 | Stop reason: HOSPADM

## 2024-06-01 RX ORDER — 0.9 % SODIUM CHLORIDE 0.9 %
1000 INTRAVENOUS SOLUTION INTRAVENOUS ONCE
Status: COMPLETED | OUTPATIENT
Start: 2024-06-01 | End: 2024-06-01

## 2024-06-01 RX ORDER — PROPOFOL 10 MG/ML
INJECTION, EMULSION INTRAVENOUS
Status: COMPLETED
Start: 2024-06-01 | End: 2024-06-01

## 2024-06-01 RX ORDER — POLYETHYLENE GLYCOL 3350 17 G/17G
17 POWDER, FOR SOLUTION ORAL DAILY
Status: DISCONTINUED | OUTPATIENT
Start: 2024-06-01 | End: 2024-06-03 | Stop reason: HOSPADM

## 2024-06-01 RX ORDER — PROPOFOL 10 MG/ML
5-50 INJECTION, EMULSION INTRAVENOUS CONTINUOUS
Status: DISCONTINUED | OUTPATIENT
Start: 2024-06-01 | End: 2024-06-01

## 2024-06-01 RX ORDER — SUCCINYLCHOLINE CHLORIDE 20 MG/ML
INJECTION INTRAMUSCULAR; INTRAVENOUS
Status: COMPLETED
Start: 2024-06-01 | End: 2024-06-01

## 2024-06-01 RX ORDER — MINERAL OIL AND WHITE PETROLATUM 150; 830 MG/G; MG/G
OINTMENT OPHTHALMIC EVERY 4 HOURS
Status: DISCONTINUED | OUTPATIENT
Start: 2024-06-01 | End: 2024-06-03 | Stop reason: HOSPADM

## 2024-06-01 RX ORDER — FENTANYL CITRATE-0.9 % NACL/PF 10 MCG/ML
25-200 PLASTIC BAG, INJECTION (ML) INTRAVENOUS CONTINUOUS
Status: DISCONTINUED | OUTPATIENT
Start: 2024-06-01 | End: 2024-06-02

## 2024-06-01 RX ORDER — MAGNESIUM SULFATE IN WATER 40 MG/ML
2000 INJECTION, SOLUTION INTRAVENOUS ONCE
Status: COMPLETED | OUTPATIENT
Start: 2024-06-01 | End: 2024-06-01

## 2024-06-01 RX ORDER — CHLORHEXIDINE GLUCONATE ORAL RINSE 1.2 MG/ML
15 SOLUTION DENTAL 2 TIMES DAILY
Status: DISCONTINUED | OUTPATIENT
Start: 2024-06-01 | End: 2024-06-03 | Stop reason: HOSPADM

## 2024-06-01 RX ORDER — DEXTROSE MONOHYDRATE AND SODIUM CHLORIDE 5; .9 G/100ML; G/100ML
INJECTION, SOLUTION INTRAVENOUS CONTINUOUS
Status: DISCONTINUED | OUTPATIENT
Start: 2024-06-01 | End: 2024-06-03 | Stop reason: HOSPADM

## 2024-06-01 RX ORDER — VECURONIUM BROMIDE 1 MG/ML
5 INJECTION, POWDER, LYOPHILIZED, FOR SOLUTION INTRAVENOUS ONCE
Status: COMPLETED | OUTPATIENT
Start: 2024-06-01 | End: 2024-06-01

## 2024-06-01 RX ORDER — ALBUMIN (HUMAN) 12.5 G/50ML
25 SOLUTION INTRAVENOUS ONCE
Status: COMPLETED | OUTPATIENT
Start: 2024-06-01 | End: 2024-06-01

## 2024-06-01 RX ORDER — WATER 10 ML/10ML
INJECTION INTRAMUSCULAR; INTRAVENOUS; SUBCUTANEOUS
Status: DISPENSED
Start: 2024-06-01 | End: 2024-06-01

## 2024-06-01 RX ADMIN — Medication 150 MCG/HR: at 06:35

## 2024-06-01 RX ADMIN — SILDENAFIL 10 MG: 20 TABLET, FILM COATED ORAL at 09:00

## 2024-06-01 RX ADMIN — PROPOFOL 50 MG: 10 INJECTION, EMULSION INTRAVENOUS at 01:59

## 2024-06-01 RX ADMIN — VECURONIUM BROMIDE 5 MG: 1 INJECTION, POWDER, LYOPHILIZED, FOR SOLUTION INTRAVENOUS at 02:05

## 2024-06-01 RX ADMIN — MINERAL OIL, WHITE PETROLATUM: .03; .94 OINTMENT OPHTHALMIC at 14:42

## 2024-06-01 RX ADMIN — HYDROCORTISONE SODIUM SUCCINATE 50 MG: 100 INJECTION, POWDER, FOR SOLUTION INTRAMUSCULAR; INTRAVENOUS at 09:01

## 2024-06-01 RX ADMIN — POLYVINYL ALCOHOL, POVIDONE 2 DROP: 14; 6 SOLUTION/ DROPS OPHTHALMIC at 13:03

## 2024-06-01 RX ADMIN — ACETYLCYSTEINE 600 MG: 200 SOLUTION ORAL; RESPIRATORY (INHALATION) at 09:23

## 2024-06-01 RX ADMIN — MINERAL OIL, WHITE PETROLATUM: .03; .94 OINTMENT OPHTHALMIC at 08:30

## 2024-06-01 RX ADMIN — PIPERACILLIN AND TAZOBACTAM 3375 MG: 3; .375 INJECTION, POWDER, LYOPHILIZED, FOR SOLUTION INTRAVENOUS at 11:21

## 2024-06-01 RX ADMIN — SILDENAFIL 10 MG: 20 TABLET, FILM COATED ORAL at 20:39

## 2024-06-01 RX ADMIN — IPRATROPIUM BROMIDE AND ALBUTEROL SULFATE 1 DOSE: .5; 2.5 SOLUTION RESPIRATORY (INHALATION) at 20:09

## 2024-06-01 RX ADMIN — PROPOFOL 100 MG: 10 INJECTION, EMULSION INTRAVENOUS at 02:08

## 2024-06-01 RX ADMIN — EPOPROSTENOL 50 NG/KG/MIN: 1.5 INJECTION, POWDER, LYOPHILIZED, FOR SOLUTION INTRAVENOUS at 04:04

## 2024-06-01 RX ADMIN — OVINE DIGOXIN IMMUNE FAB 80 MG: 40 INJECTION, POWDER, FOR SOLUTION INTRAVENOUS at 03:00

## 2024-06-01 RX ADMIN — SODIUM CHLORIDE 1000 ML: 9 INJECTION, SOLUTION INTRAVENOUS at 02:00

## 2024-06-01 RX ADMIN — IPRATROPIUM BROMIDE AND ALBUTEROL SULFATE 1 DOSE: .5; 2.5 SOLUTION RESPIRATORY (INHALATION) at 16:30

## 2024-06-01 RX ADMIN — Medication 150 MCG/HR: at 19:58

## 2024-06-01 RX ADMIN — BUDESONIDE INHALATION 500 MCG: 0.5 SUSPENSION RESPIRATORY (INHALATION) at 09:23

## 2024-06-01 RX ADMIN — LEVETIRACETAM 500 MG: 500 TABLET, FILM COATED ORAL at 20:39

## 2024-06-01 RX ADMIN — ACETAMINOPHEN 650 MG: 325 TABLET ORAL at 22:12

## 2024-06-01 RX ADMIN — INSULIN LISPRO 1 UNITS: 100 INJECTION, SOLUTION INTRAVENOUS; SUBCUTANEOUS at 11:19

## 2024-06-01 RX ADMIN — ACETAMINOPHEN 650 MG: 325 TABLET ORAL at 12:01

## 2024-06-01 RX ADMIN — Medication 1000 UNITS: at 09:07

## 2024-06-01 RX ADMIN — ARFORMOTEROL TARTRATE 15 MCG: 15 SOLUTION RESPIRATORY (INHALATION) at 20:09

## 2024-06-01 RX ADMIN — PIPERACILLIN AND TAZOBACTAM 3375 MG: 3; .375 INJECTION, POWDER, LYOPHILIZED, FOR SOLUTION INTRAVENOUS at 20:44

## 2024-06-01 RX ADMIN — HYDROCORTISONE SODIUM SUCCINATE 50 MG: 100 INJECTION, POWDER, FOR SOLUTION INTRAMUSCULAR; INTRAVENOUS at 20:39

## 2024-06-01 RX ADMIN — ACETYLCYSTEINE 600 MG: 200 SOLUTION ORAL; RESPIRATORY (INHALATION) at 13:08

## 2024-06-01 RX ADMIN — SILDENAFIL 10 MG: 20 TABLET, FILM COATED ORAL at 12:52

## 2024-06-01 RX ADMIN — Medication 150 MCG/HR: at 01:58

## 2024-06-01 RX ADMIN — AMIODARONE HYDROCHLORIDE 1 MG/MIN: 50 INJECTION, SOLUTION INTRAVENOUS at 13:21

## 2024-06-01 RX ADMIN — FERROUS SULFATE TAB 325 MG (65 MG ELEMENTAL FE) 325 MG: 325 (65 FE) TAB at 09:01

## 2024-06-01 RX ADMIN — AMIODARONE HYDROCHLORIDE 0.5 MG/MIN: 50 INJECTION, SOLUTION INTRAVENOUS at 18:34

## 2024-06-01 RX ADMIN — SUCCINYLCHOLINE CHLORIDE 200 MG: 20 INJECTION, SOLUTION INTRAMUSCULAR; INTRAVENOUS at 01:00

## 2024-06-01 RX ADMIN — SODIUM CHLORIDE: 9 INJECTION, SOLUTION INTRAVENOUS at 04:17

## 2024-06-01 RX ADMIN — FLUCONAZOLE IN SODIUM CHLORIDE 400 MG: 2 INJECTION, SOLUTION INTRAVENOUS at 12:05

## 2024-06-01 RX ADMIN — EPOPROSTENOL 50 NG/KG/MIN: 1.5 INJECTION, POWDER, LYOPHILIZED, FOR SOLUTION INTRAVENOUS at 13:57

## 2024-06-01 RX ADMIN — DESMOPRESSIN ACETATE 200 MCG: 0.1 TABLET ORAL at 09:01

## 2024-06-01 RX ADMIN — POLYVINYL ALCOHOL, POVIDONE 2 DROP: 14; 6 SOLUTION/ DROPS OPHTHALMIC at 22:12

## 2024-06-01 RX ADMIN — POLYETHYLENE GLYCOL 3350 17 G: 17 POWDER, FOR SOLUTION ORAL at 12:52

## 2024-06-01 RX ADMIN — BUDESONIDE INHALATION 500 MCG: 0.5 SUSPENSION RESPIRATORY (INHALATION) at 20:09

## 2024-06-01 RX ADMIN — MULTIVITAMIN TABLET 1 TABLET: TABLET at 09:07

## 2024-06-01 RX ADMIN — APIXABAN 5 MG: 5 TABLET, FILM COATED ORAL at 09:01

## 2024-06-01 RX ADMIN — SODIUM CHLORIDE, PRESERVATIVE FREE 40 MG: 5 INJECTION INTRAVENOUS at 09:07

## 2024-06-01 RX ADMIN — ACETYLCYSTEINE 600 MG: 200 SOLUTION ORAL; RESPIRATORY (INHALATION) at 16:30

## 2024-06-01 RX ADMIN — HYDROCORTISONE SODIUM SUCCINATE 50 MG: 100 INJECTION, POWDER, FOR SOLUTION INTRAMUSCULAR; INTRAVENOUS at 12:53

## 2024-06-01 RX ADMIN — AMIODARONE HYDROCHLORIDE 150 MG: 50 INJECTION, SOLUTION INTRAVENOUS at 13:07

## 2024-06-01 RX ADMIN — LEVOTHYROXINE SODIUM 50 MCG: 0.05 TABLET ORAL at 06:58

## 2024-06-01 RX ADMIN — PIPERACILLIN AND TAZOBACTAM 3375 MG: 3; .375 INJECTION, POWDER, LYOPHILIZED, FOR SOLUTION INTRAVENOUS at 06:52

## 2024-06-01 RX ADMIN — MINERAL OIL, WHITE PETROLATUM: .03; .94 OINTMENT OPHTHALMIC at 14:29

## 2024-06-01 RX ADMIN — SODIUM CHLORIDE, PRESERVATIVE FREE 10 ML: 5 INJECTION INTRAVENOUS at 09:34

## 2024-06-01 RX ADMIN — MINERAL OIL, WHITE PETROLATUM: .03; .94 OINTMENT OPHTHALMIC at 10:33

## 2024-06-01 RX ADMIN — ALBUMIN (HUMAN) 25 G: 0.25 INJECTION, SOLUTION INTRAVENOUS at 13:36

## 2024-06-01 RX ADMIN — ACETYLCYSTEINE 600 MG: 200 SOLUTION ORAL; RESPIRATORY (INHALATION) at 20:09

## 2024-06-01 RX ADMIN — CHLORHEXIDINE GLUCONATE, 0.12% ORAL RINSE 15 ML: 1.2 SOLUTION DENTAL at 20:43

## 2024-06-01 RX ADMIN — DICLOFENAC SODIUM 2 G: 10 GEL TOPICAL at 20:42

## 2024-06-01 RX ADMIN — Medication 8 MCG/MIN: at 10:39

## 2024-06-01 RX ADMIN — MAGNESIUM SULFATE IN WATER 2000 MG: 40 INJECTION, SOLUTION INTRAVENOUS at 04:22

## 2024-06-01 RX ADMIN — CISATRACURIUM BESYLATE 2 MCG/KG/MIN: 200 INJECTION, SOLUTION INTRAVENOUS at 03:53

## 2024-06-01 RX ADMIN — OMEGA-3-ACID ETHYL ESTERS 2 G: 1 CAPSULE, LIQUID FILLED ORAL at 09:07

## 2024-06-01 RX ADMIN — DESMOPRESSIN ACETATE 200 MCG: 0.1 TABLET ORAL at 20:38

## 2024-06-01 RX ADMIN — POLYVINYL ALCOHOL, POVIDONE 2 DROP: 14; 6 SOLUTION/ DROPS OPHTHALMIC at 17:00

## 2024-06-01 RX ADMIN — APIXABAN 5 MG: 5 TABLET, FILM COATED ORAL at 20:39

## 2024-06-01 RX ADMIN — OVINE DIGOXIN IMMUNE FAB 80 MG: 40 INJECTION, POWDER, FOR SOLUTION INTRAVENOUS at 09:18

## 2024-06-01 RX ADMIN — ARFORMOTEROL TARTRATE 15 MCG: 15 SOLUTION RESPIRATORY (INHALATION) at 09:23

## 2024-06-01 RX ADMIN — ACETAMINOPHEN 650 MG: 325 TABLET ORAL at 06:00

## 2024-06-01 RX ADMIN — IPRATROPIUM BROMIDE AND ALBUTEROL SULFATE 1 DOSE: .5; 2.5 SOLUTION RESPIRATORY (INHALATION) at 09:23

## 2024-06-01 RX ADMIN — Medication 150 MCG/HR: at 14:05

## 2024-06-01 RX ADMIN — CHLORHEXIDINE GLUCONATE, 0.12% ORAL RINSE 15 ML: 1.2 SOLUTION DENTAL at 09:00

## 2024-06-01 RX ADMIN — DEXTROSE AND SODIUM CHLORIDE: 5; 900 INJECTION, SOLUTION INTRAVENOUS at 13:35

## 2024-06-01 RX ADMIN — IPRATROPIUM BROMIDE AND ALBUTEROL SULFATE 1 DOSE: .5; 2.5 SOLUTION RESPIRATORY (INHALATION) at 13:08

## 2024-06-01 RX ADMIN — MINERAL OIL, WHITE PETROLATUM: .03; .94 OINTMENT OPHTHALMIC at 20:41

## 2024-06-01 ASSESSMENT — PAIN SCALES - WONG BAKER

## 2024-06-01 ASSESSMENT — PULMONARY FUNCTION TESTS
PIF_VALUE: 37
PIF_VALUE: 39
PIF_VALUE: 32
PIF_VALUE: 28
PIF_VALUE: 32
PIF_VALUE: 25
PIF_VALUE: 37
PIF_VALUE: 28
PIF_VALUE: 37
PIF_VALUE: 31
PIF_VALUE: 31
PIF_VALUE: 38
PIF_VALUE: 28
PIF_VALUE: 32
PIF_VALUE: 30
PIF_VALUE: 26
PIF_VALUE: 32
PIF_VALUE: 34
PIF_VALUE: 32
PIF_VALUE: 29
PIF_VALUE: 32
PIF_VALUE: 37
PIF_VALUE: 31
PIF_VALUE: 31
PIF_VALUE: 27
PIF_VALUE: 28
PIF_VALUE: 31
PIF_VALUE: 33
PIF_VALUE: 40
PIF_VALUE: 37
PIF_VALUE: 33
PIF_VALUE: 32
PIF_VALUE: 27
PIF_VALUE: 32
PIF_VALUE: 37

## 2024-06-01 ASSESSMENT — PAIN SCALES - GENERAL
PAINLEVEL_OUTOF10: 0

## 2024-06-01 NOTE — ANESTHESIA PROCEDURE NOTES
Airway  Date/Time: 6/1/2024 12:55 AM  Urgency: emergent      General Information and Staff    Patient location during procedure: ICU  Resident/CRNA: Beatriz Whitaker APRN - CRNA  Performed: resident/CRNA   Performed by: Beatriz Whitaker APRN - CRNA  Authorized by: Beatriz Whitaker APRN - CRNA      Consent for Airway (if performed for an anesthetic, see related documentation for consents)  Patient identity confirmed: per hospital policy  Consent: The procedure was performed in an emergent situation. Verbal consent not obtained. Written consent not obtained.  Risks and benefits: risks, benefits and alternatives were not discussed      Code status verified:yes  Indications and Patient Condition  Indications for airway management: hypercapnia, hypoxemia, pulmonary toilet and respiratory distress  Spontaneous ventilation: present  Sedation level: deep (Propofol 100 mg, Anectine 60 mg)  Preoxygenated: yes  Patient position: sniffing  MILS not maintained throughout  Mask difficulty assessment: vent by bag mask    Final Airway Details  Final airway type: endotracheal airway      Successful airway: ETT  Cuffed: yes   Successful intubation technique: video laryngoscopy  Facilitating devices/methods: intubating stylet  Endotracheal tube insertion site: oral  Blade type: C-Mac, D- blade.  Blade size: #4  ETT size (mm): 8.0  Cormack-Lehane Classification: grade IIb - view of arytenoids or posterior of glottis only  Placement verified by: chest auscultation and capnometry   Measured from: lips  ETT to teeth (cm): 22  Number of attempts at approach: 1  Ventilation between attempts: bag mask  Number of other approaches attempted: 0    Additional Comments  EtCO2 28

## 2024-06-01 NOTE — PROCEDURES
Arterial line placement    Procedure: Right Radial arterial line placement.     Indications: Continuous monitoring of blood pressure in a patient with hypotension +/- shock, on Levophed.     Anesthesia: Local infiltration of 1% lidocaine.     Consent:  Unable to be obtained due to the emergent nature of this procedure.    Technique: Time Out: Immediately prior to the procedure a \"timeout\" was called to verify the correct patient and procedure. Procedure was done using strict aseptic technique. Viet's test was performed and was normal. Right Radial site was cleaned with chloraprep and draped. Right Radial was identified, then Lidocaine 1% was infiltrated locally.  Arterial line was inserted, a good blood flow was obtained, after which guidewire was inserted all the way with no resistance. Then the canula was inserted and needle with guidewire was withdrawn. Pulsatile bright red blood flow was observed. The canula was connected to BP monitoring apparatus and a good quality waveform was noted. Then the canula was secured with 2 stay sutures of 2-0 silk after Lidocaine infiltration, following which dressing was applied.     Number of sticks: 5.     Number of Kits used: 5.     Complications: No immediate complication.      Estimated blood loss: About 4 ml.     Comment: Patient tolerated the procedure well.     Kaia Plaza MD PGY-3  6/1/2024 5:21 AM        [facility]  Department of Pulmonary, Critical Care and Sleep Medicine  [facility]  Department of Internal Medicine  Attending Procedural Note Addendum Statement    This procedure was supervised. The critical elements of this procedure was monitored and, if needed, I was available for the needs of the procedure.     Elijah Cobb DO, FCCP, FACOI, FACP

## 2024-06-01 NOTE — PROCEDURES
Central Line Insertion     Procedure: left femoral vein triple lumen catheter placement.    Indications: vascular access    Consent: Unable to be obtained due to the emergent nature of this procedure.    Number of sticks: 3    Number of Kits used: 3    Procedure: Time Out: Immediately prior to the procedure a \"timeout\" was called to verify the correct patient and procedure. The patient was place in the trendelenburg position and the skin over the left femoral vein was prepped with Chloraprep and draped in a sterile fashion. Local anesthesia was not performed due to the emergent nature of this procedure.  With ultrasound guidance a large bore needle was used to identify the vein, dark non pulsatile blood returned. The guide wire was then inserted through the needle with minimal resistance. 2 mm nick was made in the skin beside the guidewire. Then a dilator was inserted and removed. A triple lumen catheter was then inserted into the vessel over the guide wire using the Seldinger technique to the 15 cm narciso.  All ports showed good, free flowing blood return and were flushed with saline solution.  The catheter was then securely fastened to the skin with sutures and with an adhesive dressing and covered with a bio patch and sterile dressing.  A post procedure X-ray was not indicated.    Complications: None   The patient tolerated the procedure well.    Estimated blood loss: 3 ml.    Kaia Plaza MD PGY-3  6/1/2024  5:19 AM      [facility]  Department of Pulmonary, Critical Care and Sleep Medicine  [facility] [place]  Department of Internal Medicine  Attending Statement    This patient has a high probability of sudden clinically significant deterioration, which requires the highest level of physician preparedness to intervene urgently. I managed/supervised life or organ supporting interventions that required frequent physician assessment. I devoted my full attention

## 2024-06-01 NOTE — CODE DOCUMENTATION
Code Blue Note    Code blue was called at 0225    Initial rhythm was noted to be PEA arrest    ACLS protocol was initiated and the following was given:    Epinephrine: 1   Amiodarone: 0  Atropine:  1  Calcium gluconate: 1   Magnesium sulfate: 1  Sodium bicarbonate: 2   Defibrillation: 0    Patient intubated and on ventilator.  Central line inserted per team members.  ABG obtained revealed: 7.19/65/58/24    We continued CPR for 2mins, and pt was revived    Family, PCP and Intensivist were updated.    ----------------------------------------------------------------------------------------------------------    2nd Code blue was called at 0435    Initial rhythm was noted to be PEA arrest    ACLS protocol was initiated and the following was given:    Epinephrine: 2  Amiodarone: 0  Atropine:  0  Calcium gluconate: 0   Magnesium sulfate: 0  Sodium bicarbonate: 0   Defibrillation: 0    Patient intubated and on ventilator.  ABG obtained revealed: 7.19/65/58/24    We continued CPR for 4mins, and pt was revived    Family, PCP and Intensivist were updated.    Edwin Wood MD, PGY-3  6/1/2024  5:29 AM

## 2024-06-02 ENCOUNTER — APPOINTMENT (OUTPATIENT)
Dept: GENERAL RADIOLOGY | Age: 69
DRG: 208 | End: 2024-06-02
Payer: MEDICARE

## 2024-06-02 LAB
AADO2: 602.7 MMHG
ANION GAP SERPL CALCULATED.3IONS-SCNC: 16 MMOL/L (ref 7–16)
ANION GAP SERPL CALCULATED.3IONS-SCNC: 21 MMOL/L (ref 7–16)
B.E.: 0 MMOL/L (ref -3–3)
B.E.: 0.4 MMOL/L (ref -3–3)
BASOPHILS # BLD: 0 K/UL (ref 0–0.2)
BASOPHILS NFR BLD: 0 % (ref 0–2)
BUN SERPL-MCNC: 58 MG/DL (ref 6–23)
BUN SERPL-MCNC: 61 MG/DL (ref 6–23)
CALCIUM SERPL-MCNC: 7.9 MG/DL (ref 8.6–10.2)
CALCIUM SERPL-MCNC: 8.2 MG/DL (ref 8.6–10.2)
CHLORIDE SERPL-SCNC: 100 MMOL/L (ref 98–107)
CHLORIDE SERPL-SCNC: 104 MMOL/L (ref 98–107)
CO2 SERPL-SCNC: 22 MMOL/L (ref 22–29)
CO2 SERPL-SCNC: 23 MMOL/L (ref 22–29)
COHB: 1.1 % (ref 0–1.5)
COHB: 1.4 % (ref 0–1.5)
CREAT SERPL-MCNC: 2.1 MG/DL (ref 0.5–1)
CREAT SERPL-MCNC: 2.1 MG/DL (ref 0.5–1)
CRITICAL: ABNORMAL
CRITICAL: ABNORMAL
DATE ANALYZED: ABNORMAL
DATE ANALYZED: ABNORMAL
DATE LAST DOSE: ABNORMAL
DATE OF COLLECTION: ABNORMAL
DATE OF COLLECTION: ABNORMAL
DIGOXIN DOSE TIME: ABNORMAL
DIGOXIN DOSE: ABNORMAL MG
DIGOXIN SERPL-MCNC: 0.4 NG/ML (ref 0.8–2)
EOSINOPHIL # BLD: 0 K/UL (ref 0.05–0.5)
EOSINOPHILS RELATIVE PERCENT: 0 % (ref 0–6)
ERYTHROCYTE [DISTWIDTH] IN BLOOD BY AUTOMATED COUNT: 18 % (ref 11.5–15)
FIO2: 100 %
FIO2: 100 %
GFR, ESTIMATED: 25 ML/MIN/1.73M2
GFR, ESTIMATED: 26 ML/MIN/1.73M2
GLUCOSE BLD-MCNC: 139 MG/DL (ref 74–99)
GLUCOSE BLD-MCNC: 154 MG/DL (ref 74–99)
GLUCOSE BLD-MCNC: 156 MG/DL (ref 74–99)
GLUCOSE BLD-MCNC: 157 MG/DL (ref 74–99)
GLUCOSE SERPL-MCNC: 143 MG/DL (ref 74–99)
GLUCOSE SERPL-MCNC: 153 MG/DL (ref 74–99)
HCO3: 26.2 MMOL/L (ref 22–26)
HCO3: 26.3 MMOL/L (ref 22–26)
HCT VFR BLD AUTO: 27.6 % (ref 34–48)
HGB BLD-MCNC: 8.3 G/DL (ref 11.5–15.5)
HHB: 12.9 % (ref 0–5)
HHB: 6 % (ref 0–5)
LAB: ABNORMAL
LAB: ABNORMAL
LACTATE BLDV-SCNC: 2.9 MMOL/L (ref 0.5–2.2)
LACTATE BLDV-SCNC: 3 MMOL/L (ref 0.5–2.2)
LACTATE BLDV-SCNC: 3.1 MMOL/L (ref 0.5–2.2)
LACTATE BLDV-SCNC: 3.2 MMOL/L (ref 0.5–2.2)
LACTATE BLDV-SCNC: 3.4 MMOL/L (ref 0.5–2.2)
LACTATE BLDV-SCNC: 3.4 MMOL/L (ref 0.5–2.2)
LACTATE BLDV-SCNC: 3.7 MMOL/L (ref 0.5–2.2)
LYMPHOCYTES NFR BLD: 0 K/UL (ref 1.5–4)
LYMPHOCYTES RELATIVE PERCENT: 0 % (ref 20–42)
Lab: 1843
Lab: 425
MAGNESIUM SERPL-MCNC: 3.2 MG/DL (ref 1.6–2.6)
MCH RBC QN AUTO: 25 PG (ref 26–35)
MCHC RBC AUTO-ENTMCNC: 30.1 G/DL (ref 32–34.5)
MCV RBC AUTO: 83.1 FL (ref 80–99.9)
METHB: 0.3 % (ref 0–1.5)
METHB: 0.8 % (ref 0–1.5)
MODE: AC
MODE: AC
MONOCYTES NFR BLD: 0.27 K/UL (ref 0.1–0.95)
MONOCYTES NFR BLD: 2 % (ref 2–12)
NEUTROPHILS NFR BLD: 98 % (ref 43–80)
NEUTS SEG NFR BLD: 15.33 K/UL (ref 1.8–7.3)
O2 CONTENT: 10.7 ML/DL
O2 CONTENT: 12.3 ML/DL
O2 SATURATION: 86.9 % (ref 92–98.5)
O2 SATURATION: 93.9 % (ref 92–98.5)
O2HB: 85.2 % (ref 94–97)
O2HB: 92.3 % (ref 94–97)
OPERATOR ID: 359
OPERATOR ID: ABNORMAL
PATIENT TEMP: 37
PATIENT TEMP: 37 C
PCO2: 47.8 MMHG (ref 35–45)
PCO2: 51.2 MMHG (ref 35–45)
PEEP/CPAP: 12 CMH2O
PEEP/CPAP: 12 CMH2O
PFO2: 0.59 MMHG/%
PFO2: 0.78 MMHG/%
PH BLOOD GAS: 7.33 (ref 7.35–7.45)
PH BLOOD GAS: 7.36 (ref 7.35–7.45)
PHOSPHATE SERPL-MCNC: 4.8 MG/DL (ref 2.5–4.5)
PLATELET # BLD AUTO: 127 K/UL (ref 130–450)
PMV BLD AUTO: 11.1 FL (ref 7–12)
PO2: 59.1 MMHG (ref 75–100)
PO2: 77.7 MMHG (ref 75–100)
POTASSIUM SERPL-SCNC: 3 MMOL/L (ref 3.5–5)
POTASSIUM SERPL-SCNC: 4 MMOL/L (ref 3.5–5)
RBC # BLD AUTO: 3.32 M/UL (ref 3.5–5.5)
RBC # BLD: ABNORMAL 10*6/UL
RI(T): 10.2
RR MECHANICAL: 22 B/MIN
RR MECHANICAL: 22 B/MIN
SODIUM SERPL-SCNC: 143 MMOL/L (ref 132–146)
SODIUM SERPL-SCNC: 143 MMOL/L (ref 132–146)
SOURCE, BLOOD GAS: ABNORMAL
SOURCE, BLOOD GAS: ABNORMAL
THB: 8.9 G/DL (ref 11.5–16.5)
THB: 9.4 G/DL (ref 11.5–16.5)
TIME ANALYZED: 1847
TIME ANALYZED: 434
VT MECHANICAL: 350 ML
VT MECHANICAL: 350 ML
WBC OTHER # BLD: 15.6 K/UL (ref 4.5–11.5)

## 2024-06-02 PROCEDURE — 6360000002 HC RX W HCPCS: Performed by: NURSE PRACTITIONER

## 2024-06-02 PROCEDURE — 2580000003 HC RX 258: Performed by: NURSE PRACTITIONER

## 2024-06-02 PROCEDURE — 2000000000 HC ICU R&B

## 2024-06-02 PROCEDURE — 71045 X-RAY EXAM CHEST 1 VIEW: CPT

## 2024-06-02 PROCEDURE — 2580000003 HC RX 258

## 2024-06-02 PROCEDURE — 6370000000 HC RX 637 (ALT 250 FOR IP)

## 2024-06-02 PROCEDURE — 2580000003 HC RX 258: Performed by: INTERNAL MEDICINE

## 2024-06-02 PROCEDURE — A4216 STERILE WATER/SALINE, 10 ML: HCPCS

## 2024-06-02 PROCEDURE — 2580000003 HC RX 258: Performed by: STUDENT IN AN ORGANIZED HEALTH CARE EDUCATION/TRAINING PROGRAM

## 2024-06-02 PROCEDURE — 6360000002 HC RX W HCPCS

## 2024-06-02 PROCEDURE — 2580000003 HC RX 258: Performed by: CHIROPRACTOR

## 2024-06-02 PROCEDURE — 83605 ASSAY OF LACTIC ACID: CPT

## 2024-06-02 PROCEDURE — 2500000003 HC RX 250 WO HCPCS: Performed by: CHIROPRACTOR

## 2024-06-02 PROCEDURE — 84100 ASSAY OF PHOSPHORUS: CPT

## 2024-06-02 PROCEDURE — C9113 INJ PANTOPRAZOLE SODIUM, VIA: HCPCS

## 2024-06-02 PROCEDURE — 94645 CONT INHLJ TX EACH ADDL HOUR: CPT

## 2024-06-02 PROCEDURE — 2500000003 HC RX 250 WO HCPCS

## 2024-06-02 PROCEDURE — 6360000002 HC RX W HCPCS: Performed by: CHIROPRACTOR

## 2024-06-02 PROCEDURE — 80048 BASIC METABOLIC PNL TOTAL CA: CPT

## 2024-06-02 PROCEDURE — 82962 GLUCOSE BLOOD TEST: CPT

## 2024-06-02 PROCEDURE — 82805 BLOOD GASES W/O2 SATURATION: CPT

## 2024-06-02 PROCEDURE — 85025 COMPLETE CBC W/AUTO DIFF WBC: CPT

## 2024-06-02 PROCEDURE — 80162 ASSAY OF DIGOXIN TOTAL: CPT

## 2024-06-02 PROCEDURE — 94640 AIRWAY INHALATION TREATMENT: CPT

## 2024-06-02 PROCEDURE — 94003 VENT MGMT INPAT SUBQ DAY: CPT

## 2024-06-02 PROCEDURE — 83735 ASSAY OF MAGNESIUM: CPT

## 2024-06-02 PROCEDURE — 6360000002 HC RX W HCPCS: Performed by: STUDENT IN AN ORGANIZED HEALTH CARE EDUCATION/TRAINING PROGRAM

## 2024-06-02 PROCEDURE — 6370000000 HC RX 637 (ALT 250 FOR IP): Performed by: STUDENT IN AN ORGANIZED HEALTH CARE EDUCATION/TRAINING PROGRAM

## 2024-06-02 RX ORDER — LEVETIRACETAM 500 MG/5ML
500 INJECTION, SOLUTION, CONCENTRATE INTRAVENOUS EVERY 12 HOURS
Status: DISCONTINUED | OUTPATIENT
Start: 2024-06-02 | End: 2024-06-03 | Stop reason: HOSPADM

## 2024-06-02 RX ORDER — ACETYLCYSTEINE 200 MG/ML
600 SOLUTION ORAL; RESPIRATORY (INHALATION)
Status: DISCONTINUED | OUTPATIENT
Start: 2024-06-02 | End: 2024-06-02 | Stop reason: SDUPTHER

## 2024-06-02 RX ORDER — SODIUM CHLORIDE FOR INHALATION 3 %
2 VIAL, NEBULIZER (ML) INHALATION EVERY 4 HOURS
Status: DISCONTINUED | OUTPATIENT
Start: 2024-06-02 | End: 2024-06-03 | Stop reason: HOSPADM

## 2024-06-02 RX ORDER — ACETYLCYSTEINE 200 MG/ML
600 SOLUTION ORAL; RESPIRATORY (INHALATION)
Status: DISCONTINUED | OUTPATIENT
Start: 2024-06-02 | End: 2024-06-02

## 2024-06-02 RX ORDER — FENTANYL CITRATE-0.9 % NACL/PF 10 MCG/ML
25-200 PLASTIC BAG, INJECTION (ML) INTRAVENOUS CONTINUOUS
Status: DISCONTINUED | OUTPATIENT
Start: 2024-06-02 | End: 2024-06-03 | Stop reason: HOSPADM

## 2024-06-02 RX ORDER — POTASSIUM CHLORIDE 29.8 MG/ML
40 INJECTION INTRAVENOUS EVERY 4 HOURS
Status: COMPLETED | OUTPATIENT
Start: 2024-06-02 | End: 2024-06-02

## 2024-06-02 RX ORDER — MIDAZOLAM HYDROCHLORIDE 1 MG/ML
1-10 INJECTION, SOLUTION INTRAVENOUS CONTINUOUS
Status: DISCONTINUED | OUTPATIENT
Start: 2024-06-02 | End: 2024-06-03 | Stop reason: HOSPADM

## 2024-06-02 RX ADMIN — LEVETIRACETAM 500 MG: 100 INJECTION, SOLUTION INTRAVENOUS at 20:28

## 2024-06-02 RX ADMIN — APIXABAN 5 MG: 5 TABLET, FILM COATED ORAL at 08:02

## 2024-06-02 RX ADMIN — SILDENAFIL 10 MG: 20 TABLET, FILM COATED ORAL at 08:02

## 2024-06-02 RX ADMIN — POLYVINYL ALCOHOL, POVIDONE 2 DROP: 14; 6 SOLUTION/ DROPS OPHTHALMIC at 13:23

## 2024-06-02 RX ADMIN — BUDESONIDE INHALATION 500 MCG: 0.5 SUSPENSION RESPIRATORY (INHALATION) at 20:23

## 2024-06-02 RX ADMIN — PIPERACILLIN AND TAZOBACTAM 3375 MG: 3; .375 INJECTION, POWDER, LYOPHILIZED, FOR SOLUTION INTRAVENOUS at 05:34

## 2024-06-02 RX ADMIN — BUDESONIDE INHALATION 500 MCG: 0.5 SUSPENSION RESPIRATORY (INHALATION) at 08:24

## 2024-06-02 RX ADMIN — HYDROCORTISONE SODIUM SUCCINATE 50 MG: 100 INJECTION, POWDER, FOR SOLUTION INTRAMUSCULAR; INTRAVENOUS at 20:28

## 2024-06-02 RX ADMIN — ACETAMINOPHEN 650 MG: 325 TABLET ORAL at 13:04

## 2024-06-02 RX ADMIN — Medication 150 MCG/HR: at 08:47

## 2024-06-02 RX ADMIN — PIPERACILLIN AND TAZOBACTAM 3375 MG: 3; .375 INJECTION, POWDER, LYOPHILIZED, FOR SOLUTION INTRAVENOUS at 21:04

## 2024-06-02 RX ADMIN — HYDROCORTISONE SODIUM SUCCINATE 50 MG: 100 INJECTION, POWDER, FOR SOLUTION INTRAMUSCULAR; INTRAVENOUS at 08:03

## 2024-06-02 RX ADMIN — IPRATROPIUM BROMIDE AND ALBUTEROL SULFATE 1 DOSE: .5; 2.5 SOLUTION RESPIRATORY (INHALATION) at 12:58

## 2024-06-02 RX ADMIN — SODIUM CHLORIDE, PRESERVATIVE FREE 40 MG: 5 INJECTION INTRAVENOUS at 08:01

## 2024-06-02 RX ADMIN — MINERAL OIL, WHITE PETROLATUM: .03; .94 OINTMENT OPHTHALMIC at 12:47

## 2024-06-02 RX ADMIN — POLYVINYL ALCOHOL, POVIDONE 2 DROP: 14; 6 SOLUTION/ DROPS OPHTHALMIC at 05:36

## 2024-06-02 RX ADMIN — HYDROCORTISONE SODIUM SUCCINATE 50 MG: 100 INJECTION, POWDER, FOR SOLUTION INTRAMUSCULAR; INTRAVENOUS at 02:45

## 2024-06-02 RX ADMIN — ACETYLCYSTEINE 600 MG: 200 SOLUTION ORAL; RESPIRATORY (INHALATION) at 08:24

## 2024-06-02 RX ADMIN — EPOPROSTENOL 50 NG/KG/MIN: 1.5 INJECTION, POWDER, LYOPHILIZED, FOR SOLUTION INTRAVENOUS at 21:22

## 2024-06-02 RX ADMIN — CHLORHEXIDINE GLUCONATE, 0.12% ORAL RINSE 15 ML: 1.2 SOLUTION DENTAL at 08:02

## 2024-06-02 RX ADMIN — IPRATROPIUM BROMIDE AND ALBUTEROL SULFATE 1 DOSE: .5; 2.5 SOLUTION RESPIRATORY (INHALATION) at 08:24

## 2024-06-02 RX ADMIN — LEVOTHYROXINE SODIUM 50 MCG: 0.05 TABLET ORAL at 05:35

## 2024-06-02 RX ADMIN — Medication 2 MG/HR: at 20:46

## 2024-06-02 RX ADMIN — MINERAL OIL, WHITE PETROLATUM: .03; .94 OINTMENT OPHTHALMIC at 01:00

## 2024-06-02 RX ADMIN — ACETAMINOPHEN 650 MG: 325 TABLET ORAL at 05:35

## 2024-06-02 RX ADMIN — CISATRACURIUM BESYLATE 1.5 MCG/KG/MIN: 200 INJECTION, SOLUTION INTRAVENOUS at 06:44

## 2024-06-02 RX ADMIN — Medication 2 ML: at 23:25

## 2024-06-02 RX ADMIN — SILDENAFIL 10 MG: 20 TABLET, FILM COATED ORAL at 13:04

## 2024-06-02 RX ADMIN — DESMOPRESSIN ACETATE 200 MCG: 0.1 TABLET ORAL at 08:02

## 2024-06-02 RX ADMIN — SILDENAFIL 10 MG: 20 TABLET, FILM COATED ORAL at 20:39

## 2024-06-02 RX ADMIN — POLYVINYL ALCOHOL, POVIDONE 2 DROP: 14; 6 SOLUTION/ DROPS OPHTHALMIC at 02:45

## 2024-06-02 RX ADMIN — FLUCONAZOLE IN SODIUM CHLORIDE 400 MG: 2 INJECTION, SOLUTION INTRAVENOUS at 12:43

## 2024-06-02 RX ADMIN — Medication 2 ML: at 20:21

## 2024-06-02 RX ADMIN — EPOPROSTENOL 50 NG/KG/MIN: 1.5 INJECTION, POWDER, LYOPHILIZED, FOR SOLUTION INTRAVENOUS at 11:13

## 2024-06-02 RX ADMIN — MINERAL OIL, WHITE PETROLATUM: .03; .94 OINTMENT OPHTHALMIC at 04:45

## 2024-06-02 RX ADMIN — Medication 150 MCG/HR: at 01:35

## 2024-06-02 RX ADMIN — SODIUM CHLORIDE, PRESERVATIVE FREE 10 ML: 5 INJECTION INTRAVENOUS at 08:03

## 2024-06-02 RX ADMIN — APIXABAN 5 MG: 5 TABLET, FILM COATED ORAL at 20:27

## 2024-06-02 RX ADMIN — Medication 25 MCG/HR: at 18:32

## 2024-06-02 RX ADMIN — POLYETHYLENE GLYCOL 3350 17 G: 17 POWDER, FOR SOLUTION ORAL at 08:02

## 2024-06-02 RX ADMIN — POLYVINYL ALCOHOL, POVIDONE 2 DROP: 14; 6 SOLUTION/ DROPS OPHTHALMIC at 17:31

## 2024-06-02 RX ADMIN — ARFORMOTEROL TARTRATE 15 MCG: 15 SOLUTION RESPIRATORY (INHALATION) at 08:24

## 2024-06-02 RX ADMIN — HYDROCORTISONE SODIUM SUCCINATE 50 MG: 100 INJECTION, POWDER, FOR SOLUTION INTRAMUSCULAR; INTRAVENOUS at 13:04

## 2024-06-02 RX ADMIN — ACETAMINOPHEN 650 MG: 325 TABLET ORAL at 20:27

## 2024-06-02 RX ADMIN — SODIUM CHLORIDE, PRESERVATIVE FREE 10 ML: 5 INJECTION INTRAVENOUS at 21:16

## 2024-06-02 RX ADMIN — IPRATROPIUM BROMIDE AND ALBUTEROL SULFATE 1 DOSE: .5; 2.5 SOLUTION RESPIRATORY (INHALATION) at 20:23

## 2024-06-02 RX ADMIN — IPRATROPIUM BROMIDE AND ALBUTEROL SULFATE 1 DOSE: .5; 2.5 SOLUTION RESPIRATORY (INHALATION) at 16:52

## 2024-06-02 RX ADMIN — CHLORHEXIDINE GLUCONATE, 0.12% ORAL RINSE 15 ML: 1.2 SOLUTION DENTAL at 20:29

## 2024-06-02 RX ADMIN — LEVETIRACETAM 500 MG: 100 INJECTION, SOLUTION INTRAVENOUS at 08:35

## 2024-06-02 RX ADMIN — POLYVINYL ALCOHOL, POVIDONE 2 DROP: 14; 6 SOLUTION/ DROPS OPHTHALMIC at 20:36

## 2024-06-02 RX ADMIN — POLYVINYL ALCOHOL, POVIDONE 2 DROP: 14; 6 SOLUTION/ DROPS OPHTHALMIC at 10:08

## 2024-06-02 RX ADMIN — PIPERACILLIN AND TAZOBACTAM 3375 MG: 3; .375 INJECTION, POWDER, LYOPHILIZED, FOR SOLUTION INTRAVENOUS at 13:19

## 2024-06-02 RX ADMIN — MINERAL OIL, WHITE PETROLATUM: .03; .94 OINTMENT OPHTHALMIC at 16:52

## 2024-06-02 RX ADMIN — DESMOPRESSIN ACETATE 200 MCG: 0.1 TABLET ORAL at 20:39

## 2024-06-02 RX ADMIN — AMIODARONE HYDROCHLORIDE 0.5 MG/MIN: 50 INJECTION, SOLUTION INTRAVENOUS at 09:23

## 2024-06-02 RX ADMIN — Medication 12 MCG/MIN: at 06:36

## 2024-06-02 RX ADMIN — ARFORMOTEROL TARTRATE 15 MCG: 15 SOLUTION RESPIRATORY (INHALATION) at 20:23

## 2024-06-02 RX ADMIN — MULTIVITAMIN TABLET 1 TABLET: TABLET at 08:02

## 2024-06-02 RX ADMIN — EPOPROSTENOL 50 NG/KG/MIN: 1.5 INJECTION, POWDER, LYOPHILIZED, FOR SOLUTION INTRAVENOUS at 00:24

## 2024-06-02 RX ADMIN — MINERAL OIL, WHITE PETROLATUM: .03; .94 OINTMENT OPHTHALMIC at 08:10

## 2024-06-02 RX ADMIN — POTASSIUM CHLORIDE 40 MEQ: 29.8 INJECTION, SOLUTION INTRAVENOUS at 10:15

## 2024-06-02 RX ADMIN — POTASSIUM CHLORIDE 40 MEQ: 29.8 INJECTION, SOLUTION INTRAVENOUS at 06:59

## 2024-06-02 RX ADMIN — Medication 1000 UNITS: at 08:04

## 2024-06-02 ASSESSMENT — PAIN SCALES - WONG BAKER

## 2024-06-02 ASSESSMENT — PULMONARY FUNCTION TESTS
PIF_VALUE: 33
PIF_VALUE: 34
PIF_VALUE: 33
PIF_VALUE: 31
PIF_VALUE: 28
PIF_VALUE: 33
PIF_VALUE: 30
PIF_VALUE: 33
PIF_VALUE: 30
PIF_VALUE: 33
PIF_VALUE: 33
PIF_VALUE: 35
PIF_VALUE: 35
PIF_VALUE: 36
PIF_VALUE: 48
PIF_VALUE: 35
PIF_VALUE: 31
PIF_VALUE: 31
PIF_VALUE: 36
PIF_VALUE: 30
PIF_VALUE: 35
PIF_VALUE: 34
PIF_VALUE: 33
PIF_VALUE: 34
PIF_VALUE: 35
PIF_VALUE: 33
PIF_VALUE: 34
PIF_VALUE: 36
PIF_VALUE: 36
PIF_VALUE: 37
PIF_VALUE: 31
PIF_VALUE: 34
PIF_VALUE: 34
PIF_VALUE: 33
PIF_VALUE: 33
PIF_VALUE: 31
PIF_VALUE: 33
PIF_VALUE: 33
PIF_VALUE: 32
PIF_VALUE: 35
PIF_VALUE: 29
PIF_VALUE: 34
PIF_VALUE: 40
PIF_VALUE: 33
PIF_VALUE: 35
PIF_VALUE: 33
PIF_VALUE: 33
PIF_VALUE: 39
PIF_VALUE: 31
PIF_VALUE: 33

## 2024-06-02 ASSESSMENT — PAIN SCALES - GENERAL
PAINLEVEL_OUTOF10: 0

## 2024-06-03 ENCOUNTER — APPOINTMENT (OUTPATIENT)
Dept: GENERAL RADIOLOGY | Age: 69
DRG: 208 | End: 2024-06-03
Payer: MEDICARE

## 2024-06-03 VITALS
RESPIRATION RATE: 22 BRPM | BODY MASS INDEX: 27.96 KG/M2 | TEMPERATURE: 98.5 F | WEIGHT: 142.42 LBS | HEIGHT: 60 IN | DIASTOLIC BLOOD PRESSURE: 57 MMHG | OXYGEN SATURATION: 89 % | HEART RATE: 95 BPM | SYSTOLIC BLOOD PRESSURE: 90 MMHG

## 2024-06-03 LAB
1,3 BETA GLUCAN SER-MCNC: 84 PG/ML
1,3 BETA-D-GLUCAN INTERP: POSITIVE
AADO2: 598.7 MMHG
AADO2: 603 MMHG
ALBUMIN SERPL-MCNC: 2.3 G/DL (ref 3.5–5.2)
ALBUMIN SERPL-MCNC: 3.3 G/DL (ref 3.5–5.2)
ALP SERPL-CCNC: 76 U/L (ref 35–104)
ALP SERPL-CCNC: 92 U/L (ref 35–104)
ALT SERPL-CCNC: 2213 U/L (ref 0–32)
ALT SERPL-CCNC: 79 U/L (ref 0–32)
ANION GAP SERPL CALCULATED.3IONS-SCNC: 22 MMOL/L (ref 7–16)
ANION GAP SERPL CALCULATED.3IONS-SCNC: 30 MMOL/L (ref 7–16)
APAP SERPL-MCNC: 12 UG/ML (ref 10–30)
AST SERPL-CCNC: 4560 U/L (ref 0–31)
AST SERPL-CCNC: 71 U/L (ref 0–31)
B.E.: -13.6 MMOL/L (ref -3–3)
B.E.: -18.3 MMOL/L (ref -3–3)
B.E.: -7.6 MMOL/L (ref -3–3)
B.E.: -8 MMOL/L (ref -3–3)
BASOPHILS # BLD: 0 K/UL (ref 0–0.2)
BASOPHILS # BLD: 0 K/UL (ref 0–0.2)
BASOPHILS NFR BLD: 0 % (ref 0–2)
BASOPHILS NFR BLD: 0 % (ref 0–2)
BILIRUB DIRECT SERPL-MCNC: 0.8 MG/DL (ref 0–0.3)
BILIRUB INDIRECT SERPL-MCNC: 0.4 MG/DL (ref 0–1)
BILIRUB SERPL-MCNC: 1.2 MG/DL (ref 0–1.2)
BILIRUB SERPL-MCNC: 1.3 MG/DL (ref 0–1.2)
BUN SERPL-MCNC: 63 MG/DL (ref 6–23)
BUN SERPL-MCNC: 64 MG/DL (ref 6–23)
CALCIUM SERPL-MCNC: 7.2 MG/DL (ref 8.6–10.2)
CALCIUM SERPL-MCNC: 8 MG/DL (ref 8.6–10.2)
CHLORIDE SERPL-SCNC: 105 MMOL/L (ref 98–107)
CHLORIDE SERPL-SCNC: 107 MMOL/L (ref 98–107)
CO2 SERPL-SCNC: 12 MMOL/L (ref 22–29)
CO2 SERPL-SCNC: 19 MMOL/L (ref 22–29)
COHB: 0.8 % (ref 0–1.5)
COHB: 0.8 % (ref 0–1.5)
COHB: 1.2 % (ref 0–1.5)
COHB: 1.5 % (ref 0–1.5)
CREAT SERPL-MCNC: 2.4 MG/DL (ref 0.5–1)
CREAT SERPL-MCNC: 2.6 MG/DL (ref 0.5–1)
CRITICAL: ABNORMAL
DATE ANALYZED: ABNORMAL
DATE OF COLLECTION: ABNORMAL
EKG ATRIAL RATE: 25 BPM
EKG ATRIAL RATE: 68 BPM
EKG Q-T INTERVAL: 294 MS
EKG Q-T INTERVAL: 384 MS
EKG QRS DURATION: 120 MS
EKG QRS DURATION: 82 MS
EKG QTC CALCULATION (BAZETT): 389 MS
EKG QTC CALCULATION (BAZETT): 464 MS
EKG R AXIS: 100 DEGREES
EKG R AXIS: 91 DEGREES
EKG T AXIS: -19 DEGREES
EKG T AXIS: -23 DEGREES
EKG VENTRICULAR RATE: 150 BPM
EKG VENTRICULAR RATE: 62 BPM
EOSINOPHIL # BLD: 0 K/UL (ref 0.05–0.5)
EOSINOPHIL # BLD: 0 K/UL (ref 0.05–0.5)
EOSINOPHILS RELATIVE PERCENT: 0 % (ref 0–6)
EOSINOPHILS RELATIVE PERCENT: 0 % (ref 0–6)
ERYTHROCYTE [DISTWIDTH] IN BLOOD BY AUTOMATED COUNT: 18.3 % (ref 11.5–15)
ERYTHROCYTE [DISTWIDTH] IN BLOOD BY AUTOMATED COUNT: 18.4 % (ref 11.5–15)
FIO2: 100 %
FIO2: 100 %
GFR, ESTIMATED: 19 ML/MIN/1.73M2
GFR, ESTIMATED: 21 ML/MIN/1.73M2
GLUCOSE BLD-MCNC: 113 MG/DL (ref 74–99)
GLUCOSE BLD-MCNC: 130 MG/DL (ref 74–99)
GLUCOSE BLD-MCNC: 188 MG/DL (ref 74–99)
GLUCOSE BLD-MCNC: 48 MG/DL (ref 74–99)
GLUCOSE BLD-MCNC: 89 MG/DL (ref 74–99)
GLUCOSE SERPL-MCNC: 108 MG/DL (ref 74–99)
GLUCOSE SERPL-MCNC: 56 MG/DL (ref 74–99)
HCO3: 12.3 MMOL/L (ref 22–26)
HCO3: 15.5 MMOL/L (ref 22–26)
HCO3: 20.2 MMOL/L (ref 22–26)
HCO3: 20.8 MMOL/L (ref 22–26)
HCT VFR BLD AUTO: 18.8 % (ref 34–48)
HCT VFR BLD AUTO: 26.6 % (ref 34–48)
HGB BLD-MCNC: 5.2 G/DL (ref 11.5–15.5)
HGB BLD-MCNC: 7.8 G/DL (ref 11.5–15.5)
HHB: 19.8 % (ref 0–5)
HHB: 19.9 % (ref 0–5)
HHB: 20 % (ref 0–5)
HHB: 21.6 % (ref 0–5)
INR PPP: 6.4
INR PPP: >10
LAB: ABNORMAL
LACTATE BLDV-SCNC: 17.3 MMOL/L (ref 0.5–2.2)
LACTATE BLDV-SCNC: 21.6 MMOL/L (ref 0.5–2.2)
LACTATE BLDV-SCNC: 6.8 MMOL/L (ref 0.5–2.2)
LYMPHOCYTES NFR BLD: 0.42 K/UL (ref 1.5–4)
LYMPHOCYTES NFR BLD: 0.7 K/UL (ref 1.5–4)
LYMPHOCYTES RELATIVE PERCENT: 2 % (ref 20–42)
LYMPHOCYTES RELATIVE PERCENT: 4 % (ref 20–42)
Lab: 1205
Lab: 430
Lab: 535
Lab: 906
MAGNESIUM SERPL-MCNC: 3.2 MG/DL (ref 1.6–2.6)
MAGNESIUM SERPL-MCNC: 3.2 MG/DL (ref 1.6–2.6)
MAGNESIUM SERPL-MCNC: 3.4 MG/DL (ref 1.6–2.6)
MCH RBC QN AUTO: 25.5 PG (ref 26–35)
MCH RBC QN AUTO: 25.7 PG (ref 26–35)
MCHC RBC AUTO-ENTMCNC: 27.7 G/DL (ref 32–34.5)
MCHC RBC AUTO-ENTMCNC: 29.3 G/DL (ref 32–34.5)
MCV RBC AUTO: 86.9 FL (ref 80–99.9)
MCV RBC AUTO: 93.1 FL (ref 80–99.9)
METHB: 0.6 % (ref 0–1.5)
METHB: 0.6 % (ref 0–1.5)
METHB: 1 % (ref 0–1.5)
METHB: 1.2 % (ref 0–1.5)
MICROORGANISM SPEC CULT: ABNORMAL
MICROORGANISM SPEC CULT: ABNORMAL
MICROORGANISM/AGENT SPEC: ABNORMAL
MODE: ABNORMAL
MODE: ABNORMAL
MODE: AC
MODE: AC
MONOCYTES NFR BLD: 0.35 K/UL (ref 0.1–0.95)
MONOCYTES NFR BLD: 1.25 K/UL (ref 0.1–0.95)
MONOCYTES NFR BLD: 2 % (ref 2–12)
MONOCYTES NFR BLD: 5 % (ref 2–12)
NEUTROPHILS NFR BLD: 93 % (ref 43–80)
NEUTROPHILS NFR BLD: 95 % (ref 43–80)
NEUTS SEG NFR BLD: 19.15 K/UL (ref 1.8–7.3)
NEUTS SEG NFR BLD: 21.64 K/UL (ref 1.8–7.3)
NUCLEATED RED BLOOD CELLS: 2 PER 100 WBC
NUCLEATED RED BLOOD CELLS: 2 PER 100 WBC
O2 CONTENT: 5.6 ML/DL
O2 CONTENT: 7.4 ML/DL
O2 CONTENT: 9.4 ML/DL
O2 CONTENT: 9.9 ML/DL
O2 SATURATION: 77.9 % (ref 92–98.5)
O2 SATURATION: 79.7 % (ref 92–98.5)
O2HB: 76.3 % (ref 94–97)
O2HB: 77.8 % (ref 94–97)
O2HB: 78.3 % (ref 94–97)
O2HB: 78.6 % (ref 94–97)
OPERATOR ID: 7291
OPERATOR ID: 7291
OPERATOR ID: ABNORMAL
OPERATOR ID: ABNORMAL
PATIENT TEMP: 37 C
PCO2: 55 MMHG (ref 35–45)
PCO2: 56.1 MMHG (ref 35–45)
PCO2: 58.1 MMHG (ref 35–45)
PCO2: 61.3 MMHG (ref 35–45)
PEEP/CPAP: 12 CMH2O
PEEP/CPAP: 15 CMH2O
PFO2: 0.54 MMHG/%
PFO2: 0.56 MMHG/%
PH BLOOD GAS: 6.92 (ref 7.35–7.45)
PH BLOOD GAS: 7.07 (ref 7.35–7.45)
PH BLOOD GAS: 7.17 (ref 7.35–7.45)
PH BLOOD GAS: 7.17 (ref 7.35–7.45)
PHOSPHATE SERPL-MCNC: 10.7 MG/DL (ref 2.5–4.5)
PHOSPHATE SERPL-MCNC: 5.7 MG/DL (ref 2.5–4.5)
PHOSPHATE SERPL-MCNC: 9.3 MG/DL (ref 2.5–4.5)
PLATELET # BLD AUTO: 108 K/UL (ref 130–450)
PLATELET # BLD AUTO: 75 K/UL (ref 130–450)
PLATELET CONFIRMATION: NORMAL
PMV BLD AUTO: 11.6 FL (ref 7–12)
PMV BLD AUTO: 11.8 FL (ref 7–12)
PO2: 53.9 MMHG (ref 75–100)
PO2: 56.2 MMHG (ref 75–100)
PO2: 61.6 MMHG (ref 75–100)
PO2: 68.9 MMHG (ref 75–100)
POTASSIUM SERPL-SCNC: 4.5 MMOL/L (ref 3.5–5)
POTASSIUM SERPL-SCNC: 5.6 MMOL/L (ref 3.5–5)
POTASSIUM SERPL-SCNC: 5.84 MMOL/L (ref 3.5–5)
POTASSIUM SERPL-SCNC: 6.73 MMOL/L (ref 3.5–5)
PROT SERPL-MCNC: 4.2 G/DL (ref 6.4–8.3)
PROT SERPL-MCNC: 6.2 G/DL (ref 6.4–8.3)
PROTHROMBIN TIME: 107.9 SEC (ref 9.3–12.4)
PROTHROMBIN TIME: 68.8 SEC (ref 9.3–12.4)
RBC # BLD AUTO: 2.02 M/UL (ref 3.5–5.5)
RBC # BLD AUTO: 3.06 M/UL (ref 3.5–5.5)
RBC # BLD: ABNORMAL 10*6/UL
RI(T): 10.65
RI(T): 11.19
RR MECHANICAL: 22 B/MIN
RR MECHANICAL: 24 B/MIN
SODIUM SERPL-SCNC: 146 MMOL/L (ref 132–146)
SODIUM SERPL-SCNC: 149 MMOL/L (ref 132–146)
SOURCE, BLOOD GAS: ABNORMAL
SPECIMEN DESCRIPTION: ABNORMAL
THB: 5 G/DL (ref 11.5–16.5)
THB: 6.6 G/DL (ref 11.5–16.5)
THB: 8.7 G/DL (ref 11.5–16.5)
THB: 8.9 G/DL (ref 11.5–16.5)
TIME ANALYZED: 1209
TIME ANALYZED: 436
TIME ANALYZED: 552
TIME ANALYZED: 906
TROPONIN I SERPL HS-MCNC: 140 NG/L (ref 0–9)
VT MECHANICAL: 350 ML
VT MECHANICAL: 350 ML
WBC OTHER # BLD: 20.2 K/UL (ref 4.5–11.5)
WBC OTHER # BLD: 23.3 K/UL (ref 4.5–11.5)

## 2024-06-03 PROCEDURE — 82805 BLOOD GASES W/O2 SATURATION: CPT

## 2024-06-03 PROCEDURE — 2580000003 HC RX 258

## 2024-06-03 PROCEDURE — 92950 HEART/LUNG RESUSCITATION CPR: CPT

## 2024-06-03 PROCEDURE — 6370000000 HC RX 637 (ALT 250 FOR IP)

## 2024-06-03 PROCEDURE — 02HV33Z INSERTION OF INFUSION DEVICE INTO SUPERIOR VENA CAVA, PERCUTANEOUS APPROACH: ICD-10-PCS | Performed by: INTERNAL MEDICINE

## 2024-06-03 PROCEDURE — 6360000002 HC RX W HCPCS

## 2024-06-03 PROCEDURE — 2580000003 HC RX 258: Performed by: INTERNAL MEDICINE

## 2024-06-03 PROCEDURE — 2580000003 HC RX 258: Performed by: NURSE PRACTITIONER

## 2024-06-03 PROCEDURE — C9113 INJ PANTOPRAZOLE SODIUM, VIA: HCPCS

## 2024-06-03 PROCEDURE — 80053 COMPREHEN METABOLIC PANEL: CPT

## 2024-06-03 PROCEDURE — 94645 CONT INHLJ TX EACH ADDL HOUR: CPT

## 2024-06-03 PROCEDURE — 2500000003 HC RX 250 WO HCPCS

## 2024-06-03 PROCEDURE — 82248 BILIRUBIN DIRECT: CPT

## 2024-06-03 PROCEDURE — 2500000003 HC RX 250 WO HCPCS: Performed by: INTERNAL MEDICINE

## 2024-06-03 PROCEDURE — 2500000003 HC RX 250 WO HCPCS: Performed by: CHIROPRACTOR

## 2024-06-03 PROCEDURE — 6360000002 HC RX W HCPCS: Performed by: CHIROPRACTOR

## 2024-06-03 PROCEDURE — 84100 ASSAY OF PHOSPHORUS: CPT

## 2024-06-03 PROCEDURE — 82962 GLUCOSE BLOOD TEST: CPT

## 2024-06-03 PROCEDURE — 84484 ASSAY OF TROPONIN QUANT: CPT

## 2024-06-03 PROCEDURE — 71045 X-RAY EXAM CHEST 1 VIEW: CPT

## 2024-06-03 PROCEDURE — 94640 AIRWAY INHALATION TREATMENT: CPT

## 2024-06-03 PROCEDURE — 5A1D70Z PERFORMANCE OF URINARY FILTRATION, INTERMITTENT, LESS THAN 6 HOURS PER DAY: ICD-10-PCS | Performed by: INTERNAL MEDICINE

## 2024-06-03 PROCEDURE — 6360000002 HC RX W HCPCS: Performed by: NURSE PRACTITIONER

## 2024-06-03 PROCEDURE — 83735 ASSAY OF MAGNESIUM: CPT

## 2024-06-03 PROCEDURE — 6360000002 HC RX W HCPCS: Performed by: INTERNAL MEDICINE

## 2024-06-03 PROCEDURE — 84132 ASSAY OF SERUM POTASSIUM: CPT

## 2024-06-03 PROCEDURE — 85610 PROTHROMBIN TIME: CPT

## 2024-06-03 PROCEDURE — 85025 COMPLETE CBC W/AUTO DIFF WBC: CPT

## 2024-06-03 PROCEDURE — 83605 ASSAY OF LACTIC ACID: CPT

## 2024-06-03 PROCEDURE — 94003 VENT MGMT INPAT SUBQ DAY: CPT

## 2024-06-03 PROCEDURE — A4216 STERILE WATER/SALINE, 10 ML: HCPCS

## 2024-06-03 PROCEDURE — 80143 DRUG ASSAY ACETAMINOPHEN: CPT

## 2024-06-03 PROCEDURE — 93005 ELECTROCARDIOGRAM TRACING: CPT

## 2024-06-03 RX ORDER — MAGNESIUM SULFATE 1 G/100ML
1000 INJECTION INTRAVENOUS PRN
Status: DISCONTINUED | OUTPATIENT
Start: 2024-06-03 | End: 2024-06-03 | Stop reason: HOSPADM

## 2024-06-03 RX ORDER — ANTICOAGULANT SODIUM CITRATE SOLUTION 4 G/100ML
2 SOLUTION INTRAVENOUS PRN
Status: DISCONTINUED | OUTPATIENT
Start: 2024-06-03 | End: 2024-06-03 | Stop reason: HOSPADM

## 2024-06-03 RX ORDER — DEXTROSE MONOHYDRATE 100 MG/ML
INJECTION, SOLUTION INTRAVENOUS CONTINUOUS
Status: DISCONTINUED | OUTPATIENT
Start: 2024-06-03 | End: 2024-06-03 | Stop reason: HOSPADM

## 2024-06-03 RX ORDER — 0.9 % SODIUM CHLORIDE 0.9 %
1000 INTRAVENOUS SOLUTION INTRAVENOUS
Status: DISCONTINUED | OUTPATIENT
Start: 2024-06-03 | End: 2024-06-03 | Stop reason: HOSPADM

## 2024-06-03 RX ORDER — POTASSIUM CHLORIDE 29.8 MG/ML
20 INJECTION INTRAVENOUS PRN
Status: DISCONTINUED | OUTPATIENT
Start: 2024-06-03 | End: 2024-06-03 | Stop reason: HOSPADM

## 2024-06-03 RX ORDER — PHENYLEPHRINE HYDROCHLORIDE 10 MG/ML
INJECTION INTRAVENOUS
Status: DISCONTINUED
Start: 2024-06-03 | End: 2024-06-03 | Stop reason: HOSPADM

## 2024-06-03 RX ORDER — SODIUM CHLORIDE 9 MG/ML
INJECTION, SOLUTION INTRAVENOUS PRN
Status: DISCONTINUED | OUTPATIENT
Start: 2024-06-03 | End: 2024-06-03 | Stop reason: HOSPADM

## 2024-06-03 RX ORDER — AMIODARONE HYDROCHLORIDE 150 MG/3ML
INJECTION, SOLUTION INTRAVENOUS
Status: DISCONTINUED
Start: 2024-06-03 | End: 2024-06-03 | Stop reason: HOSPADM

## 2024-06-03 RX ORDER — EPINEPHRINE IN SOD CHLOR,ISO 1 MG/10 ML
SYRINGE (ML) INTRAVENOUS
Status: COMPLETED | OUTPATIENT
Start: 2024-06-03 | End: 2024-06-03

## 2024-06-03 RX ORDER — CALCIUM GLUCONATE 10 MG/ML
1000 INJECTION, SOLUTION INTRAVENOUS PRN
Status: DISCONTINUED | OUTPATIENT
Start: 2024-06-03 | End: 2024-06-03 | Stop reason: HOSPADM

## 2024-06-03 RX ORDER — ATROPINE SULFATE 0.1 MG/ML
INJECTION INTRAVENOUS
Status: COMPLETED | OUTPATIENT
Start: 2024-06-03 | End: 2024-06-03

## 2024-06-03 RX ORDER — CALCIUM CHLORIDE 100 MG/ML
INJECTION INTRAVENOUS; INTRAVENTRICULAR
Status: COMPLETED | OUTPATIENT
Start: 2024-06-03 | End: 2024-06-03

## 2024-06-03 RX ORDER — DEXTROSE MONOHYDRATE 25 G/50ML
INJECTION, SOLUTION INTRAVENOUS
Status: COMPLETED | OUTPATIENT
Start: 2024-06-03 | End: 2024-06-03

## 2024-06-03 RX ORDER — SODIUM CHLORIDE 9 MG/ML
INJECTION, SOLUTION INTRAVENOUS
Status: DISCONTINUED
Start: 2024-06-03 | End: 2024-06-03 | Stop reason: HOSPADM

## 2024-06-03 RX ORDER — SODIUM CHLORIDE 9 MG/ML
INJECTION, SOLUTION INTRAVENOUS CONTINUOUS
Status: DISCONTINUED | OUTPATIENT
Start: 2024-06-03 | End: 2024-06-03 | Stop reason: HOSPADM

## 2024-06-03 RX ORDER — DOPAMINE HYDROCHLORIDE 160 MG/100ML
1-20 INJECTION, SOLUTION INTRAVENOUS CONTINUOUS
Status: DISCONTINUED | OUTPATIENT
Start: 2024-06-03 | End: 2024-06-03 | Stop reason: HOSPADM

## 2024-06-03 RX ADMIN — POLYVINYL ALCOHOL, POVIDONE 2 DROP: 14; 6 SOLUTION/ DROPS OPHTHALMIC at 09:38

## 2024-06-03 RX ADMIN — SILDENAFIL 10 MG: 20 TABLET, FILM COATED ORAL at 08:24

## 2024-06-03 RX ADMIN — Medication 1 MG: at 09:04

## 2024-06-03 RX ADMIN — HYDROCORTISONE SODIUM SUCCINATE 50 MG: 100 INJECTION, POWDER, FOR SOLUTION INTRAMUSCULAR; INTRAVENOUS at 02:30

## 2024-06-03 RX ADMIN — Medication 1 MG: at 09:13

## 2024-06-03 RX ADMIN — Medication 2 ML: at 08:11

## 2024-06-03 RX ADMIN — AMIODARONE HYDROCHLORIDE 150 MG: 50 INJECTION, SOLUTION INTRAVENOUS at 05:34

## 2024-06-03 RX ADMIN — Medication 100 MCG/MIN: at 09:53

## 2024-06-03 RX ADMIN — SODIUM CHLORIDE, PRESERVATIVE FREE 10 ML: 5 INJECTION INTRAVENOUS at 08:25

## 2024-06-03 RX ADMIN — SODIUM CHLORIDE, PRESERVATIVE FREE 40 MG: 5 INJECTION INTRAVENOUS at 08:22

## 2024-06-03 RX ADMIN — PHENYLEPHRINE HYDROCHLORIDE 300 MCG/MIN: 100 INJECTION INTRAVENOUS at 09:40

## 2024-06-03 RX ADMIN — DEXTROSE AND SODIUM CHLORIDE: 5; 900 INJECTION, SOLUTION INTRAVENOUS at 04:55

## 2024-06-03 RX ADMIN — SODIUM BICARBONATE 50 MEQ: 84 INJECTION, SOLUTION INTRAVENOUS at 09:06

## 2024-06-03 RX ADMIN — SODIUM BICARBONATE 25 MEQ: 84 INJECTION INTRAVENOUS at 11:54

## 2024-06-03 RX ADMIN — MULTIVITAMIN TABLET 1 TABLET: TABLET at 08:22

## 2024-06-03 RX ADMIN — SODIUM BICARBONATE: 84 INJECTION INTRAVENOUS at 07:47

## 2024-06-03 RX ADMIN — Medication 1 MG: at 09:07

## 2024-06-03 RX ADMIN — DOPAMINE HYDROCHLORIDE 10 MCG/KG/MIN: 160 INJECTION, SOLUTION INTRAVENOUS at 09:15

## 2024-06-03 RX ADMIN — DEXTROSE MONOHYDRATE 25 G: 25 INJECTION, SOLUTION INTRAVENOUS at 09:06

## 2024-06-03 RX ADMIN — LEVOTHYROXINE SODIUM 50 MCG: 0.05 TABLET ORAL at 08:22

## 2024-06-03 RX ADMIN — IPRATROPIUM BROMIDE AND ALBUTEROL SULFATE 1 DOSE: .5; 2.5 SOLUTION RESPIRATORY (INHALATION) at 11:08

## 2024-06-03 RX ADMIN — MINERAL OIL, WHITE PETROLATUM: .03; .94 OINTMENT OPHTHALMIC at 08:25

## 2024-06-03 RX ADMIN — SODIUM BICARBONATE 50 MEQ: 84 INJECTION, SOLUTION INTRAVENOUS at 09:01

## 2024-06-03 RX ADMIN — VASOPRESSIN 0.03 UNITS/MIN: 20 INJECTION INTRAVENOUS at 03:54

## 2024-06-03 RX ADMIN — PIPERACILLIN AND TAZOBACTAM 3375 MG: 3; .375 INJECTION, POWDER, LYOPHILIZED, FOR SOLUTION INTRAVENOUS at 06:03

## 2024-06-03 RX ADMIN — Medication 30 MCG/MIN: at 02:30

## 2024-06-03 RX ADMIN — LEVETIRACETAM 500 MG: 100 INJECTION, SOLUTION INTRAVENOUS at 08:22

## 2024-06-03 RX ADMIN — MINERAL OIL, WHITE PETROLATUM: .03; .94 OINTMENT OPHTHALMIC at 00:43

## 2024-06-03 RX ADMIN — PHENYLEPHRINE HYDROCHLORIDE 30 MCG/MIN: 100 INJECTION INTRAVENOUS at 05:50

## 2024-06-03 RX ADMIN — EPINEPHRINE 20 MCG/MIN: 1 INJECTION INTRAMUSCULAR; INTRAVENOUS; SUBCUTANEOUS at 08:59

## 2024-06-03 RX ADMIN — VASOPRESSIN 0.04 UNITS/MIN: 20 INJECTION INTRAVENOUS at 10:45

## 2024-06-03 RX ADMIN — CALCIUM CHLORIDE 1000 MG: 100 INJECTION, SOLUTION INTRAVENOUS; INTRAVENTRICULAR at 09:11

## 2024-06-03 RX ADMIN — POLYVINYL ALCOHOL, POVIDONE 2 DROP: 14; 6 SOLUTION/ DROPS OPHTHALMIC at 06:22

## 2024-06-03 RX ADMIN — ATROPINE SULFATE 1 MG: 0.1 INJECTION, SOLUTION ENDOTRACHEAL; INTRAMUSCULAR; INTRAVENOUS; SUBCUTANEOUS at 09:14

## 2024-06-03 RX ADMIN — Medication 1 MG: at 09:01

## 2024-06-03 RX ADMIN — POLYVINYL ALCOHOL, POVIDONE 2 DROP: 14; 6 SOLUTION/ DROPS OPHTHALMIC at 03:05

## 2024-06-03 RX ADMIN — HYDROCORTISONE SODIUM SUCCINATE 50 MG: 100 INJECTION, POWDER, FOR SOLUTION INTRAMUSCULAR; INTRAVENOUS at 08:23

## 2024-06-03 RX ADMIN — Medication 2 ML: at 11:08

## 2024-06-03 RX ADMIN — IPRATROPIUM BROMIDE AND ALBUTEROL SULFATE 1 DOSE: .5; 2.5 SOLUTION RESPIRATORY (INHALATION) at 08:11

## 2024-06-03 RX ADMIN — DESMOPRESSIN ACETATE 200 MCG: 0.1 TABLET ORAL at 08:23

## 2024-06-03 RX ADMIN — Medication 2 ML: at 04:17

## 2024-06-03 RX ADMIN — APIXABAN 5 MG: 5 TABLET, FILM COATED ORAL at 08:22

## 2024-06-03 RX ADMIN — DICLOFENAC SODIUM 2 G: 10 GEL TOPICAL at 08:26

## 2024-06-03 RX ADMIN — AMIODARONE HYDROCHLORIDE 0.5 MG/MIN: 50 INJECTION, SOLUTION INTRAVENOUS at 00:36

## 2024-06-03 RX ADMIN — MINERAL OIL, WHITE PETROLATUM: .03; .94 OINTMENT OPHTHALMIC at 06:23

## 2024-06-03 RX ADMIN — CHLORHEXIDINE GLUCONATE, 0.12% ORAL RINSE 15 ML: 1.2 SOLUTION DENTAL at 08:22

## 2024-06-03 RX ADMIN — Medication 1000 UNITS: at 08:22

## 2024-06-03 RX ADMIN — Medication 1 MG: at 09:10

## 2024-06-03 RX ADMIN — BUDESONIDE INHALATION 500 MCG: 0.5 SUSPENSION RESPIRATORY (INHALATION) at 08:11

## 2024-06-03 RX ADMIN — SODIUM BICARBONATE 50 MEQ: 84 INJECTION INTRAVENOUS at 05:07

## 2024-06-03 RX ADMIN — ARFORMOTEROL TARTRATE 15 MCG: 15 SOLUTION RESPIRATORY (INHALATION) at 08:11

## 2024-06-03 RX ADMIN — Medication 100 MCG/MIN: at 07:17

## 2024-06-03 RX ADMIN — SODIUM BICARBONATE 50 MEQ: 84 INJECTION, SOLUTION INTRAVENOUS at 09:09

## 2024-06-03 ASSESSMENT — PULMONARY FUNCTION TESTS
PIF_VALUE: 32
PIF_VALUE: 34
PIF_VALUE: 34
PIF_VALUE: 35
PIF_VALUE: 33
PIF_VALUE: 35
PIF_VALUE: 32
PIF_VALUE: 34
PIF_VALUE: 36
PIF_VALUE: 35
PIF_VALUE: 33
PIF_VALUE: 34
PIF_VALUE: 34
PIF_VALUE: 35
PIF_VALUE: 37
PIF_VALUE: 34
PIF_VALUE: 34
PIF_VALUE: 35
PIF_VALUE: 35
PIF_VALUE: 33
PIF_VALUE: 33
PIF_VALUE: 32
PIF_VALUE: 34
PIF_VALUE: 34
PIF_VALUE: 41
PIF_VALUE: 34
PIF_VALUE: 33
PIF_VALUE: 34
PIF_VALUE: 32
PIF_VALUE: 32
PIF_VALUE: 31
PIF_VALUE: 33
PIF_VALUE: 34
PIF_VALUE: 35
PIF_VALUE: 33
PIF_VALUE: 31
PIF_VALUE: 32
PIF_VALUE: 35
PIF_VALUE: 33
PIF_VALUE: 33
PIF_VALUE: 35
PIF_VALUE: 34
PIF_VALUE: 32
PIF_VALUE: 35
PIF_VALUE: 33
PIF_VALUE: 34
PIF_VALUE: 32
PIF_VALUE: 34
PIF_VALUE: 35
PIF_VALUE: 34
PIF_VALUE: 34
PIF_VALUE: 33
PIF_VALUE: 33
PIF_VALUE: 31

## 2024-06-03 ASSESSMENT — PAIN SCALES - GENERAL
PAINLEVEL_OUTOF10: 0

## 2024-06-03 ASSESSMENT — PAIN SCALES - WONG BAKER
WONGBAKER_NUMERICALRESPONSE: NO HURT

## 2024-06-03 NOTE — PROGRESS NOTES
Cecilio Donovan M.D.,[facility]  Shaheed Agudelo D.O., F.A.C.O.I., [facility]  Evans Agarwal M.D.  Abby Black M.D.   Dr Efrain Roy, SARIKA.O.     Daily Pulmonary Progress Note    Patient:  Rebekah Rodriguez 68 y.o. female MRN: [id number]            Synopsis     We are following patient for acute on chronic resp failure     \"CC\" Arm Pain and Generalized Body Aches     Code status: FULL CODE      Subjective      Patient seen and examined . Remains Critically ill.  She is currently on full vent support. AC 22 VT  350 Peep + 12 FIO2 95%. Tx ARDS low tidal volume.  Continuous Paralytic and sedation nimbex and fentanyl.  . RASS -5. Flolan nebulized. Abg  improved. On amiodarone rate control a fib. Pressors for bp support norepinephrine.       Review of Systems:  Not able to obtain    24-hour events:  No new events    Objective   OBJECTIVE:   BP (!) 97/52   Pulse 96   Temp 97.5 °F (36.4 °C) (Temporal)   Resp 22   Ht 1.524 m (5')   Wt 64.6 kg (142 lb 6.7 oz)   LMP  (LMP Unknown)   SpO2 93%   PF 71 L/min   BMI 27.81 kg/m²   SpO2 Readings from Last 1 Encounters:   06/02/24 93%        I/O:    Intake/Output Summary (Last 24 hours) at 6/2/2024 1100  Last data filed at 6/2/2024 1000  Gross per 24 hour   Intake 2499.67 ml   Output 1248 ml   Net 1251.67 ml         Vent Information  Ventilator Day(s): 1  Ventilator ID: [id number]  Ventilator Safety Check Performed Pre-Use: Yes  Equipment Changed: (S) Expiratory Filter  Ventilator Initiate: Yes  Vent Mode: AC/VC       IPAP: 14 cmH20  CPAP/EPAP: 6 cmH2O     CURRENT MEDS :  Scheduled Meds:   potassium chloride  40 mEq IntraVENous Q4H    levETIRAcetam  500 mg IntraVENous Q12H    chlorhexidine  15 mL Mouth/Throat BID    artificial tears   Both Eyes Q4H    And    Polyvinyl Alcohol-Povidone PF  2 drop Both Eyes Q4H    hydrocortisone sodium succinate PF  50 mg IntraVENous Q6H    fluconazole  400 mg IntraVENous Q24H    polyethylene glycol  17 g Oral Daily    acetylcysteine  600 mg 
           Cecilio Donovan M.D.,[facility]  Shaheed Agudelo D.O., F.GOPIOCHANEL., [facility]  Evans Agarwal M.D.  Abby Black M.D.   Dr Efrain Roy, SARIKA.O.        Daily Pulmonary Progress Note    Patient:  Rebekah Rodriguez 68 y.o. female MRN: [id number]            Synopsis     We are following patient for acute on chronic resp fail    \"CC\" Arm Pain and Generalized Body Aches     Code status: FULL CODE      Subjective      Patient was seen and examined resting in bed on BiPap after lunch.  Settings 14/6 FiO2 decreased to 40%.  SpO2 99%. She appears comfortable and not in any distress.  Lasix temporarily held, Creat 1.5 today.  Recommend to resume home dose of 20 mg by mouth daily.  Oxygen 6 liters this is her baseline when off NIV.      Review of Systems:  Constitutional: Denies fever, weight loss, night sweats, and fatigue  Skin: Denies pigmentation, dark lesions, and rashes   HEENT: Denies hearing loss, tinnitus, ear drainage, epistaxis, sore throat, and hoarseness.  Cardiovascular: Hx severe pulmonary hypertension pre and post capillary  Respiratory: productive cough with yellow mucous   Gastrointestinal: Denies nausea, vomiting, poor appetite, diarrhea, heartburn or reflux  Genitourinary: Denies dysuria, frequency, urgency or hematuria  Musculoskeletal: Denies myalgias, muscle weakness, and bone pain  Neurological: Denies dizziness, vertigo, headache, and focal weakness  Psychological: Denies anxiety and depression  Endocrine: Denies heat intolerance and cold intolerance  Hematopoietic/Lymphatic: Denies bleeding problems and blood transfusions    24-hour events:  No new events    Objective   OBJECTIVE:   BP (!) 125/93   Pulse 73   Temp 97.5 °F (36.4 °C) (Axillary)   Resp 21   Ht 1.524 m (5')   Wt 64.8 kg (142 lb 12.8 oz)   LMP  (LMP Unknown)   SpO2 98%   PF (!) 12 L/min   BMI 27.89 kg/m²   SpO2 Readings from Last 1 Encounters:   05/14/24 98%        I/O:    Intake/Output Summary (Last 24 hours) at 5/14/2024 1352  Last 
           Cecilio Donovan M.D.,[facility]  Shaheed Agudelo D.O., F.GUANAKO.C.ORobI., [facility]  Evans Agarwal M.D.  Abby Black M.D.   Dr Efrain Roy, SARIKA.O.        Daily Pulmonary Progress Note    Patient:  Rebekah Rodriguez 68 y.o. female MRN: [id number]            Synopsis     We are following patient for acute on chronic resp fail    \"CC\" Arm Pain and Generalized Body Aches     Code status: FULL CODE      Subjective      Patient was seen and examined resting in bed.  Intermittent use of AVAPS volume 450 FiO2 50%.  EPAP +6.  SpO2 %.  Await approval for NIV device with high flow O2 delivery capabilities [employer].  Tolerating oral Lasix per nephrology recommendations, no accurate I's/O recorded.  BUN 33 creatinine 1.2, proBNP 4938.        Review of Systems:  Constitutional: Denies fever, weight loss, night sweats, and fatigue  Skin: Denies pigmentation, dark lesions, and rashes   HEENT: Denies hearing loss, tinnitus, ear drainage, epistaxis, sore throat, and hoarseness.  Cardiovascular: Hx severe pulmonary hypertension pre and post capillary  Respiratory: productive cough with yellow mucous   Gastrointestinal: Denies nausea, vomiting, poor appetite, diarrhea, heartburn or reflux  Genitourinary: Denies dysuria, frequency, urgency or hematuria  Musculoskeletal: Denies myalgias, muscle weakness, and bone pain  Neurological: Denies dizziness, vertigo, headache, and focal weakness  Psychological: Denies anxiety and depression  Endocrine: Denies heat intolerance and cold intolerance  Hematopoietic/Lymphatic: Denies bleeding problems and blood transfusions    24-hour events:  No new events    Objective   OBJECTIVE:   /82   Pulse 70   Temp (!) 96.7 °F (35.9 °C) (Axillary)   Resp 21   Ht 1.524 m (5')   Wt 61.7 kg (136 lb 1.6 oz)   LMP  (LMP Unknown)   SpO2 97%   PF 71 L/min   BMI 26.58 kg/m²   SpO2 Readings from Last 1 Encounters:   05/23/24 97%        I/O:  No intake or output data in the 24 hours 
           Cecilio Donovan M.D.,[facility]  Shaheed Agudelo D.O., F.A.C.O.I., [facility]  Evans Agarwal M.D.  Abby Black M.D.   Dr Efrain Roy, SARIKA.O.     Daily Pulmonary Progress Note    Patient:  Rebekah Rodriguez 68 y.o. female MRN: [id number]            Synopsis     We are following patient for acute on chronic resp failure     \"CC\" Arm Pain and Generalized Body Aches     Code status: FULL CODE      Subjective      Patient was seen and examined   resting in bed, currently on NIV    Intermittent use of AVAPS volume 450 FiO2 50%.  EPAP +6.  SpO2 %.   Awaiting approval of NIV and delivery to home.   Wearing 8-10 liters o2 during the day         Review of Systems:  Constitutional: Denies fever, weight loss, night sweats, and fatigue  Skin: Denies pigmentation, dark lesions, and rashes   HEENT: Denies hearing loss, tinnitus, ear drainage, epistaxis, sore throat, and hoarseness.  Cardiovascular: Hx severe pulmonary hypertension pre and post capillary  Respiratory: productive cough with yellow mucous   Gastrointestinal: Denies nausea, vomiting, poor appetite, diarrhea, heartburn or reflux  Genitourinary: Denies dysuria, frequency, urgency or hematuria  Musculoskeletal: Denies myalgias, muscle weakness, and bone pain  Neurological: Denies dizziness, vertigo, headache, and focal weakness  Psychological: Denies anxiety and depression  Endocrine: Denies heat intolerance and cold intolerance  Hematopoietic/Lymphatic: Denies bleeding problems and blood transfusions    24-hour events:  No new events    Objective   OBJECTIVE:   /79   Pulse 62   Temp 97.5 °F (36.4 °C) (Tympanic)   Resp 20   Ht 1.524 m (5')   Wt 63.4 kg (139 lb 12.8 oz)   LMP  (LMP Unknown)   SpO2 98%   PF 71 L/min   BMI 27.30 kg/m²   SpO2 Readings from Last 1 Encounters:   05/24/24 98%        I/O:  No intake or output data in the 24 hours ending 05/24/24 0955    Vent Information  Ventilator ID: [id number]       IPAP: 14 cmH20  CPAP/EPAP: 6 cmH2O 
           Cecilio Donovan M.D.,[facility]  Shaheed Agudelo D.O., F.GUANAKO.C.ORobI., [facility]  Evans Agarwal M.D.  Abby Black M.D.   Dr Efrain Roy, PEDRITOO.     Daily Pulmonary Progress Note    Patient:  Rebekah Rodriguez 68 y.o. female MRN: [id number]            Synopsis     We are following patient for acute on chronic resp failure     \"CC\" Arm Pain and Generalized Body Aches     Code status: FULL CODE      Subjective      Patient seen and examined . Remains Critically ill. Overnight events reviewed.  Initial bradycardia 30's SPO2 60's at 0141 am. Intubation performed. ETT retracted 4 cm after review of post intubation cxr. PEA arrest required CPR 0225. Second code blue PEA arrest  0435. She is currently on full vent support. AC 22 VT  350 Peep + 12 FIO2 100%. Tx ARDS low tidal volume.  Continuous Paralytic and sedation nimbex and fentanyl.  . RASS -5. Flolan started. Abg with respiratory and metabolic acidosis. Pressors for bp support norepinephrine.       Review of Systems:  Not able to obtain    24-hour events:  No new events    Objective   OBJECTIVE:   BP (!) 108/56   Pulse (!) 115   Temp 96.8 °F (36 °C) (Temporal)   Resp (!) 0   Ht 1.524 m (5')   Wt 65.5 kg (144 lb 6.4 oz)   LMP  (LMP Unknown)   SpO2 93%   PF 71 L/min   BMI 28.20 kg/m²   SpO2 Readings from Last 1 Encounters:   06/01/24 93%        I/O:    Intake/Output Summary (Last 24 hours) at 6/1/2024 1138  Last data filed at 6/1/2024 0600  Gross per 24 hour   Intake 2864 ml   Output 150 ml   Net 2714 ml         Vent Information  Ventilator Day(s): 1  Ventilator ID: [id number]  Ventilator Safety Check Performed Pre-Use: Yes  Equipment Changed: (S) Expiratory Filter  Ventilator Initiate: Yes  Vent Mode: AC/VC       IPAP: 14 cmH20  CPAP/EPAP: 6 cmH2O     CURRENT MEDS :  Scheduled Meds:   chlorhexidine  15 mL Mouth/Throat BID    sterile water        artificial tears   Both Eyes Q4H    And    Polyvinyl Alcohol-Povidone PF  2 drop Both Eyes Q4H    hydrocortisone sodium 
           Cecilio Donovan M.D.,[facility]  Shaheed Agudelo D.O., F.A.C.O.I., [facility]  Evans Agarwal M.D.  Abby Black M.D.   Dr Efrain Roy, SARIKA.O.        Daily Pulmonary Progress Note    Patient:  Rebekah Rodriguez 68 y.o. female MRN: [id number]            Synopsis     We are following patient for acute on chronic resp fail    \"CC\" Arm Pain and Generalized Body Aches     Code status: FULL CODE      Subjective      Patient was seen and examined resting in bed remains on BiPAP 14/6 FiO2 40%.  Sitter at bedside episode of confusion and delirium earlier and last evening.  Patient had episode of desaturation on 6 L to 81% requiring 11 L high flow cannula.  Will check ABGs.  Appears slightly hypersomnolent.      Review of Systems:  Constitutional: Denies fever, weight loss, night sweats, and fatigue  Skin: Denies pigmentation, dark lesions, and rashes   HEENT: Denies hearing loss, tinnitus, ear drainage, epistaxis, sore throat, and hoarseness.  Cardiovascular: Hx severe pulmonary hypertension pre and post capillary  Respiratory: productive cough with yellow mucous   Gastrointestinal: Denies nausea, vomiting, poor appetite, diarrhea, heartburn or reflux  Genitourinary: Denies dysuria, frequency, urgency or hematuria  Musculoskeletal: Denies myalgias, muscle weakness, and bone pain  Neurological: Denies dizziness, vertigo, headache, and focal weakness  Psychological: Denies anxiety and depression  Endocrine: Denies heat intolerance and cold intolerance  Hematopoietic/Lymphatic: Denies bleeding problems and blood transfusions    24-hour events:  No new events    Objective   OBJECTIVE:   BP (!) 158/95   Pulse 73   Temp 98 °F (36.7 °C) (Oral)   Resp 19   Ht 1.524 m (5')   Wt 62.8 kg (138 lb 8 oz)   LMP  (LMP Unknown)   SpO2 96%   PF 71 L/min   BMI 27.05 kg/m²   SpO2 Readings from Last 1 Encounters:   05/17/24 96%        I/O:    Intake/Output Summary (Last 24 hours) at 5/17/2024 1440  Last data filed at 5/16/2024 
           Cecilio Donovan M.D.,[facility]  Shaheed Agudelo D.O., F.GUANAKO.C.ORobI., [facility]  Evans Agarwal M.D.  Abby Black M.D.   Dr Efrain Roy, SARIKA.O.     Daily Pulmonary Progress Note    Patient:  Rebekah Rodriguez 68 y.o. female MRN: [id number]            Synopsis     We are following patient for acute on chronic resp failure     \"CC\" Arm Pain and Generalized Body Aches     Code status: FULL CODE      Subjective      Patient seen and examined . Worsening respiratory status, increased oxygen requirements up to 60 L FIO2 100% on AIRVO. Episode of emesis this am. Suctioned large amt of mucus.  Patient anxious yelling out.      Review of Systems:  Constitutional: Denies fever, weight loss, night sweats, and fatigue  Skin: Denies pigmentation, dark lesions, and rashes   HEENT: Denies hearing loss, tinnitus, ear drainage, epistaxis, sore throat, and hoarseness.  Cardiovascular: Hx severe pulmonary hypertension pre and post capillary  Respiratory: productive cough with yellow mucous   Gastrointestinal: Denies nausea, vomiting, poor appetite, diarrhea, heartburn or reflux  Genitourinary: Denies dysuria, frequency, urgency or hematuria  Musculoskeletal: Denies myalgias, muscle weakness, and bone pain  Neurological: Denies dizziness, vertigo, headache, and focal weakness  Psychological: Denies anxiety and depression  Endocrine: Denies heat intolerance and cold intolerance  Hematopoietic/Lymphatic: Denies bleeding problems and blood transfusions    24-hour events:  No new events    Objective   OBJECTIVE:   /82   Pulse 95   Temp 98 °F (36.7 °C) (Axillary)   Resp 22   Ht 1.524 m (5')   Wt 61.2 kg (135 lb)   LMP  (LMP Unknown)   SpO2 90%   PF 71 L/min   BMI 26.37 kg/m²   SpO2 Readings from Last 1 Encounters:   05/30/24 90%        I/O:    Intake/Output Summary (Last 24 hours) at 5/30/2024 1224  Last data filed at 5/30/2024 0229  Gross per 24 hour   Intake 840 ml   Output 400 ml   Net 440 ml         Vent 
           Cecilio Donovan M.D.,[facility]  Shaheed Agudelo D.O., F.GUANAKO.URIEL.OCHANEL., [facility]  Evans Agarwal M.D.  Abby Black M.D.   Dr Efrain Roy, SARIKA.O.        Daily Pulmonary Progress Note    Patient:  Rebekah Rodriguez 68 y.o. female MRN: [id number]            Synopsis     We are following patient for acute on chronic resp fail    \"CC\" Arm Pain and Generalized Body Aches     Code status: FULL CODE      Subjective      Patient was seen and examined resting in bed on oxygen 10 L nasal cannula.  Using  BiPap 14/6 at at bedtime and as needed daytime.  Tolerating IV Lasix daily.  Creatinine 1.1.  Further dosing per nephrology recommendations.      Review of Systems:  Constitutional: Denies fever, weight loss, night sweats, and fatigue  Skin: Denies pigmentation, dark lesions, and rashes   HEENT: Denies hearing loss, tinnitus, ear drainage, epistaxis, sore throat, and hoarseness.  Cardiovascular: Hx severe pulmonary hypertension pre and post capillary  Respiratory: productive cough with yellow mucous   Gastrointestinal: Denies nausea, vomiting, poor appetite, diarrhea, heartburn or reflux  Genitourinary: Denies dysuria, frequency, urgency or hematuria  Musculoskeletal: Denies myalgias, muscle weakness, and bone pain  Neurological: Denies dizziness, vertigo, headache, and focal weakness  Psychological: Denies anxiety and depression  Endocrine: Denies heat intolerance and cold intolerance  Hematopoietic/Lymphatic: Denies bleeding problems and blood transfusions    24-hour events:  No new events    Objective   OBJECTIVE:   BP (!) 134/90   Pulse 75   Temp 97.5 °F (36.4 °C) (Oral)   Resp 18   Ht 1.524 m (5')   Wt 63 kg (138 lb 14.4 oz)   LMP  (LMP Unknown)   SpO2 95%   PF 71 L/min   BMI 27.13 kg/m²   SpO2 Readings from Last 1 Encounters:   05/20/24 95%        I/O:    Intake/Output Summary (Last 24 hours) at 5/20/2024 1627  Last data filed at 5/20/2024 1218  Gross per 24 hour   Intake 10 ml   Output 1400 ml   Net -1390 ml 
       Chart reviewed. Pt discussed at rounds. Pt is not appropriate for occupational therapy services at this time. Will d/c from caseload- please re-consult if needed. Thank you. Nola Hernandez, OTR/L # [id number]     
   05/14/24 0748   NIV Type   NIV Started/Stopped On   Equipment Type v60   Mode Bilevel   Mask Type Full face mask   Mask Size Medium   Settings/Measurements   PIP Observed 13 cm H20   IPAP 14 cmH20   CPAP/EPAP 6 cmH2O   Vt (Measured) 484 mL   Rate Ordered 14   Insp Rise Time (%) 3 %   FiO2  50 %   I Time/ I Time % 0.9 s   Minute Volume (L/min) 12.8 Liters   Mask Leak (lpm) 104 lpm   Patient's Home Machine No       
   05/19/24 2005   NIV Type   NIV Started/Stopped On   Equipment Type v60   Mode Bilevel   Mask Type Full face mask   Mask Size Medium   Settings/Measurements   PIP Observed 14 cm H20   IPAP 14 cmH20   CPAP/EPAP 6 cmH2O   Vt (Measured) 450 mL   Rate Ordered 14   Insp Rise Time (%) 3 %   FiO2  50 %   I Time/ I Time % 0.9 s   Minute Volume (L/min) 7.9 Liters   Mask Leak (lpm) 54 lpm   Patient's Home Machine No       
   05/20/24 0751   NIV Type   NIV Started/Stopped On   Equipment Type v60   Mode Bilevel   Mask Type Full face mask   Mask Size Medium   Assessment   SpO2 98 %   Level of Consciousness 0   Comfort Level Good   Using Accessory Muscles No   Mask Compliance Good   Skin Assessment Clean, dry, & intact   Skin Protection for O2 Device Yes   Orientation Middle   Location Nose   Intervention(s) Skin Barrier   Settings/Measurements   IPAP 14 cmH20   CPAP/EPAP 6 cmH2O   Vt (Measured) 421 mL   Rate Ordered 14   FiO2  50 %   Minute Volume (L/min) 9.7 Liters   Mask Leak (lpm) 46 lpm   Patient's Home Machine No   Alarm Settings   Alarms On Y   Low Pressure (cmH2O) 8 cmH2O   High Pressure (cmH2O) 40 cmH2O   RR Low (bpm) 14   RR High (bpm) 35 br/min     Date: 5/20/2024    Time: 7:53 AM    Patient Placed On BIPAP/CPAP/ Non-Invasive Ventilation?  No, patient found on BiPAP.    If no must comment.  Facial area red/color change? No           If YES are Blister/Lesion present?No   If yes must notify nursing staff  BIPAP/CPAP skin barrier?  Yes    Skin barrier type:mepilexlite       Comments:        Jeanine Rodríguez RCP  
   05/21/24 0814   NIV Type   NIV Started/Stopped On   Equipment Type v60   Mode Bilevel   Mask Type Full face mask   Mask Size Medium   Assessment   Level of Consciousness 0   Comfort Level Good   Using Accessory Muscles No   Mask Compliance Good   Skin Assessment Clean, dry, & intact   Skin Protection for O2 Device Yes   Orientation Middle   Location Nose   Intervention(s) Skin Barrier   Settings/Measurements   IPAP 14 cmH20   CPAP/EPAP 6 cmH2O   Vt (Measured) 318 mL   Rate Ordered 14   FiO2  50 %   Minute Volume (L/min) 6.9 Liters   Mask Leak (lpm) 41 lpm   Patient's Home Machine No   Alarm Settings   Alarms On Y   High Pressure (cmH2O) 40 cmH2O   RR Low (bpm) 14   RR High (bpm) 35 br/min     Date: 5/21/2024    Time: 8:16 AM    Patient Placed On BIPAP/CPAP/ Non-Invasive Ventilation?  No, patient found on BiPAP.    If no must comment.  Facial area red/color change? No           If YES are Blister/Lesion present?No   If yes must notify nursing staff  BIPAP/CPAP skin barrier?  Yes    Skin barrier type:mepilexlite       Comments:        Jeanine Rodríguez RCP  
   05/31/24 0525   NIV Type   NIV Started/Stopped On   Equipment Type V60   Mode AVAPS   Mask Type Full face mask   Mask Size Small   Assessment   Pulse 92   Respirations 25   SpO2 98 %   Comfort Level Good   Using Accessory Muscles No   Mask Compliance Good   Skin Assessment Clean, dry, & intact   Skin Protection for O2 Device Yes   Orientation Middle   Location Nose   Intervention(s) Skin Barrier   Settings/Measurements   PIP Observed 20 cm H20   IPAP 14 cmH20   CPAP/EPAP 6 cmH2O   IPAP Min 20 cmH2O   IPAP Max 30 cmH2O   Vt (Set, mL) 450 mL   Vt (Measured) 495 mL   Rate Ordered 14   Insp Rise Time (%) 3 %   FiO2  100 %   I Time/ I Time % 0.8 s   Minute Volume (L/min) 11.9 Liters   Mask Leak (lpm) 50 lpm   Patient's Home Machine No   Alarm Settings   Alarms On Y   Low Pressure (cmH2O) 8 cmH2O   High Pressure (cmH2O) 30 cmH2O   Apnea (secs) 20 secs   RR Low (bpm) 14   RR High (bpm) 40 br/min     Date: 5/31/2024    Time: 5:25 AM    Patient Placed On BIPAP/CPAP/ Non-Invasive Ventilation? No  Facial area red/color change? No     If YES are Blister/Lesion present? No    BIPAP/CPAP skin barrier? Yes   Skin barrier type:mepilexlite     Comments: remains on    Ashley Serrato RCP RRT-ACCS  
   05/31/24 2141   NIV Type   NIV Started/Stopped On   Equipment Type V60   Mode AVAPS   Mask Type Full face mask   Mask Size Small   Assessment   Pulse 92   Respirations 26   SpO2 100 %   Comfort Level Good   Using Accessory Muscles No   Mask Compliance Good   Skin Assessment Clean, dry, & intact   Skin Protection for O2 Device Yes   Orientation Middle   Location Nose   Intervention(s) Skin Barrier   Settings/Measurements   PIP Observed 20 cm H20   CPAP/EPAP 6 cmH2O   IPAP Min 20 cmH2O   IPAP Max 30 cmH2O   Vt (Set, mL) 450 mL   Vt (Measured) 373 mL   Rate Ordered 14   Insp Rise Time (%) 3 %   FiO2  100 %   I Time/ I Time % 0.8 s   Minute Volume (L/min) 11.9 Liters   Mask Leak (lpm) 32 lpm   Patient's Home Machine No   Alarm Settings   Alarms On Y   Low Pressure (cmH2O) 8 cmH2O   High Pressure (cmH2O) 27 cmH2O   Apnea (secs) 20 secs   RR Low (bpm) 14   RR High (bpm) 40 br/min     Date: 5/31/2024    Time: 9:43 PM    Patient Placed On BIPAP/CPAP/ Non-Invasive Ventilation? Yes  Facial area red/color change? No     If YES are Blister/Lesion present? No     BIPAP/CPAP skin barrier? Yes   Skin barrier type:mepilexlite     Comments: placed on AVAPS 14/450/100/6    Ashley Serrato RCP RRT-ACCS  
  Pharmacy Note - Extended Infusion Beta-Lactam Adjustment    Piperacillin/Tazobactam 3375mg Q8h for treatment of Aspiration Pneumonia. Per [facility] Extended Infusion Beta-Lactam Policy, piperacillin/tazobactam will be changed to 4500mg loading dose followed by 3375mg Q8h extended infusion    Estimated Creatinine Clearance: Estimated Creatinine Clearance: 34 mL/min (A) (based on SCr of 1.3 mg/dL (H)).    BMI: Body mass index is 26.37 kg/m².    Please call with any questions.    Thank you,    Mannie Reed, [facility]  
..Patient admitted to MICU with the following belongings:  Other head dress . The following belongings admitted with the patient, Cell Phone , were sent home with the patient's family.     
0050  anesthesia here to entubate 0152 vecuronum tlcdp2gi  0052  propofol given  0053 succ given 0054 ETT in 0118 CPR sstarted atropine given for bradycardia. 0119 ABG obtained 71479 Liter NSS given 0220 mag. Given 0224 yayo hr=30 0226 calcium given 0227 pulse ROSC. 0235 digibine 40mg given 0238 vaso started 0244vaso stopped 0252 digibine 40mg given               0435 CPR started yayo no pulse 0436 epi given 0437PV=pea 0439 PV rosc  
0435 cpr TRINH NO PULSE  0436 epi given   0437 PV=PEA   0439  PV  ROSC  
4 Eyes Skin Assessment     NAME:  Rebekah Rodriguez  YOB: 1955  MEDICAL RECORD NUMBER:  [id number]    The patient is being assessed for  Admission    I agree that at least one RN has performed a thorough Head to Toe Skin Assessment on the patient. ALL assessment sites listed below have been assessed.      Areas assessed by both nurses:    Head, Face, Ears, Shoulders, Back, Chest, Arms, Elbows, Hands, Sacrum. Buttock, Coccyx, Ischium, and Legs. Feet and Heels        Does the Patient have a Wound? No noted wound(s)       Blas Prevention initiated by RN: Yes  Wound Care Orders initiated by RN: No    Pressure Injury (Stage 3,4, Unstageable, DTI, NWPT, and Complex wounds) if present, place Wound referral order by RN under : No    New Ostomies, if present place, Ostomy referral order under : No     Nurse 1 eSignature: Electronically signed by Kay Gautam RN on 5/30/24 at 7:27 PM EDT    **SHARE this note so that the co-signing nurse can place an eSignature**    Nurse 2 eSignature: Electronically signed by Lina Lyons RN on 5/30/24 at 9:14 PM EDT   
4 Eyes Skin Assessment     NAME:  Rebekah Rodriguez  YOB: 1955  MEDICAL RECORD NUMBER:  [id number]    The patient is being assessed for  Admission    I agree that at least one RN has performed a thorough Head to Toe Skin Assessment on the patient. ALL assessment sites listed below have been assessed.      Areas assessed by both nurses:    Head, Face, Ears, Shoulders, Back, Chest, Arms, Elbows, Hands, Sacrum. Buttock, Coccyx, Ischium, Legs. Feet and Heels, and Under Medical Devices         Does the Patient have a Wound? No noted wound(s)       Blas Prevention initiated by RN: No  Wound Care Orders initiated by RN: No    Pressure Injury (Stage 3,4, Unstageable, DTI, NWPT, and Complex wounds) if present, place Wound referral order by RN under : No    New Ostomies, if present place, Ostomy referral order under : No     Nurse 1 eSignature: Electronically signed by Adarsh Pickard RN on 5/11/24 at 3:10 AM EDT    **SHARE this note so that the co-signing nurse can place an eSignature**    Nurse 2 eSignature: Electronically signed by Joe Mccarthy RN on 5/11/24 at 3:15 AM EDT    
[facility]  Family Medicine Attending    Hospital Course: 68 y.o. female with presented with shoulder pain after running out of pain medication. She was found to be hypoxic after coming off BIPAP in ER and admitted for acute hypoxic resp failure.     S: This morning, patient has no new symptoms or concerns.  States her dyspnea is improving, and she feels ready to return home.      O: VS- Blood pressure (!) 144/77, pulse 64, temperature 97.7 °F (36.5 °C), temperature source Axillary, resp. rate 18, height 1.524 m (5'), weight 61.8 kg (136 lb 4.8 oz), SpO2 94 %, peak flow (!) 12 L/min    Exam is as noted by resident with the following changes, additions or corrections:  Gen: NAD, awake and alert   HEENT: AT/NC   Resp: coarse, equal  CV irregularly irregular, rate controlled.  Systolic murmur.    Ext trace edema     Impressions/Plan:     1) Acute Hypoxic Resp Failure (multifactorial) . Follow labs. Supplemental O2 and NIV as per pulm recommendations; their management is appreciated.      Increased BNP, worsening oxygen demand.  Lasix 40 mg daily with improvement of symptoms.       Cr stable, 1.2.      2) Left Hilar Mass on CXR-  CT chest unremarkable.    3) Shoulder Pain - improved; patient doesn't believe she will tolerate MRI. Given improvement in pain can f/u outpatient MRI vs CT of shoulder.     4) Transaminitis - resolved, f/u labs.     5) Pulmonary HTN - continue sildenifil.    6) persistent afib- rate controlled.  Eliquis.    7) HFpEF-lasix was on hold due to mild IRMA, now restarted.  Nephrology management appreciated.  CXR with likely fluid overload; furosemide, Cr stable today, 1.2.  Improving symptoms and fluid status.      8) HTN-controlled overall, continue current management.    9)WTH-[id number] pre-renal; had contrast CT chest and started on lasix; Cr stable at 1.2 today.  Nephrology management appreciated.      10) AMS 2/2 low O2 and hospital stay- has improved recently.      See 
Call placed to Dr. Black (on-call for Dr. Agarwal) regarding pt c/o nausea with no improvement with zofran. Pt also tachypneic. Labored, with use of accessory muscles on airvo 100% at 60L and 15 L NRB. Pt requesting to go back on AVAPS at this time but is still nauseous. Per Dr. Black, call RRT and have pt evaluated to transfer to ICU. RRT called at this time.   
Call placed to lab to draw blood cultures.  
[facility]  Family Medicine Attending    Hospital Course: 68 y.o. female with presented with shoulder pain after running out of pain medication. She was found to be hypoxic after coming off BIPAP in ER and admitted for acute hypoxic resp failure. Transferred to ICU overnight for hypoxia and concern for decompensation.     S: intubated and sedated. Treating HCAP w/ vanc/zosyn      O: VS- Blood pressure (!) 144/77, pulse 64, temperature 97.7 °F (36.5 °C), temperature source Axillary, resp. rate 18, height 1.524 m (5'), weight 61.8 kg (136 lb 4.8 oz), SpO2 94 %, peak flow (!) 12 L/min    Exam is as noted by resident with the following changes, additions or corrections:  Gen: NAD, awake and alert, on HFNC  HEENT: AT/NC   Resp: coarse, equal  CV irregularly irregular, rate controlled.  Systolic murmur.    Ext trace edema, stable ankle deformities      Impressions/Plan:     1) Acute Hypoxic Resp Failure (multifactorial) . Follow labs. Supplemental O2 and NIV as per pulm recommendations; their management is appreciated. Chest xray showed near total opacification of the left  hemithorax. CT chest unremarkable. Treatment for HCAP per pulm started with vanc and zosyn. Now antifungals.     3) Shoulder Pain - outpatient CT of shoulder.     4) Pulmonary HTN - continue sildenifil, restart amlodipine when appropriate    5) persistent afib- rate controlled.  Eliquis.    6) HFpEF-Nephrology management appreciated.  CXR with ? fluid overload; furosemide given but then held 2/2 hypotension-->IRMA. Echo 75%, pulm HTN, ddysfxn    7) HTN- home BP meds held    8) IRMA- lasix on hold. Nephro following     10) Depression - Cymbalta     Supratherapeutic digoxin. Digifab      See resident note for further details.    Dispo - continue ICU care         Attending Physician Statement  I have reviewed the chart and seen the patient with the resident(s).  I personally reviewed images, EKG's and similar tests, if present.  I 
Comprehensive Nutrition Assessment    Type and Reason for Visit:  Initial, RD Nutrition Re-Screen/LOS    Nutrition Recommendations/Plan:   Continue Diet.    Will Start ONS and monitor.       Malnutrition Assessment:  Malnutrition Status:  Severe malnutrition (05/17/24 1623)    Context:  Chronic Illness     Findings of the 6 clinical characteristics of malnutrition:  Energy Intake:  75% or less estimated energy requirements for 1 month or longer  Weight Loss:  Unable to assess     Body Fat Loss:  Severe body fat loss Orbital   Muscle Mass Loss:  Severe muscle mass loss Temples (temporalis), Clavicles (pectoralis & deltoids)  Fluid Accumulation:  Unable to assess     Strength:  Not Performed    Nutrition Assessment:    Pt adm w/ BUE paain, gen body aches x ~1d pta, followed by SOB in ER.  PMHx Severe Malnutrition (2/2024), CHF, MDD, SBO/ischemic colitis, diabetes insipidus, CKD, CRF, Sz, Rectal prolapse s/p perineal rectosigmoidectomy (2/2/24).  Adm w/ AHRF 2/2 CHFe, noted Lt Hilar mass; +IRMA and VRE.  At risk d/t currently meets criteria for Severe Malnutrition w/ ongoing poor PO intake.  Will Start ONS and monitor.    Nutrition Related Findings:    AA&O, dentition WNL, Abd/BS WDL, +1 edema, -I/O's, elevated BGL/LFTs Wound Type: None       Current Nutrition Intake & Therapies:    Average Meal Intake: 26-50%  Average Supplements Intake: None Ordered  ADULT DIET; Regular; Low Sodium (2 gm); 1500 ml    Anthropometric Measures:  Height: 152.4 cm (5')  Ideal Body Weight (IBW): 100 lbs (45 kg)    Admission Body Weight: 58.1 kg (128 lb) (bed 5/11)  Current Body Weight: 62.6 kg (138 lb) (bed 5/16; suspected to be elevated),   IBW. Weight Source: Bed Scale  Current BMI (kg/m2): 27  Usual Body Weight:  (UTO UBW 2/2 multiple wt fluctuations 2/2 fluid shifts ~120's-170's, BAYRON wt loss 2/2 fluids)     Weight Adjustment For: No Adjustment                 BMI Categories: Overweight (BMI 25.0-29.9)    Estimated Daily Nutrient 
Comprehensive Nutrition Assessment    Type and Reason for Visit:  Reassess    Nutrition Recommendations/Plan:   Recommend and start Glucerna supplement daily and Gelatein high protein supplement daily to help meet increased nutritional needs.         Malnutrition Assessment:  Malnutrition Status:  Severe malnutrition (05/17/24 1623)    Context:  Chronic Illness     Findings of the 6 clinical characteristics of malnutrition:  Energy Intake:  75% or less estimated energy requirements for 1 month or longer  Weight Loss:  Unable to assess     Body Fat Loss:  Severe body fat loss Orbital   Muscle Mass Loss:  Severe muscle mass loss Temples (temporalis), Clavicles (pectoralis & deltoids)  Fluid Accumulation:  Unable to assess     Strength:  Not Performed    Nutrition Assessment:    Patients po intake seems to be improving ; adm w/ arm pain and body aches ; also adm w/ acute pulmonary edema and acute congestive heart failure ; noted IRMA and acute hypoxic respiratory failure  ; hx of DM/COPD/CKD/pulmonary sarcoidosis/cardiac arrest ; hx of CHF/seizures/colitis/SBO ; hx of rectal prolapse s/p perineal rectosigmoidectomy (2/2/24) ; pt does meet criteria for severe malnutrition ; will provide updated recommendations    Nutrition Related Findings:    -I&Os (-3.8 L), 1+ edema, redness to buttocks, A&O x 4, active BS, muscle/fat wasting, hyperglycemia ; Wound Type: None       Current Nutrition Intake & Therapies:    Average Meal Intake: 51-75%, %  Average Supplements Intake: 26-50%  ADULT DIET; Regular; Low Sodium (2 gm); 1500 ml  ADULT ORAL NUTRITION SUPPLEMENT; Breakfast, Lunch; Diabetic Oral Supplement    Anthropometric Measures:  Height: 152.4 cm (5')  Ideal Body Weight (IBW): 100 lbs (45 kg)    Admission Body Weight: 58.1 kg (128 lb) (bed 5/11)  Current Body Weight: 58.1 kg (128 lb) (5/11, actual ; admission weight), 128 % IBW. Weight Source: Bed Scale  Current BMI (kg/m2): 25  Usual Body Weight:  (UTO UBW 2/2 
Coordination of care discussion and chart review with PM team.LSW to provide pt/family support as able.  Pt resting with bipap on in icu. No family present.   
[facility] - Family Medicine Inpatient   Resident Progress Note    S:  Hospital day: 2    Brief Synopsis: Rebekah Rodriguez is a 68 y.o. female with a past medical history of pulmonary hypertension, rectal prolapse, sarcoidosis, permanent A-fib on apixaban, Chronic HFpEF (EF 60%), COPD with chronic hypoxia requiring 6 L of oxygen at home during the night on BiPAP at night, chronic kidney disease, pulmonary embolism, adrenal insufficiency on hydrocortisone and central diabetes insipidus who presented to the emergency department for generalized body aches and bilateral arm pain. Patient was going to be discharged home after shoulder workup was conducted, but she desaturated to the 70's after attempting to take her off the Bipap.  Patient admitted for acute hypoxic respiratory failure likely secondary to CHF exacerbation. Patient was being diuresed with IV Lasix 40 mg daily. Pulmonology was consulted for further recommendations as well.     Overnight/Interim   No acute events overnight.   Patient was seen and examined at bedside this morning. Confused about her surroundings, unsure how she got to the hospital and thought she was in [facility] \"holding cake in her hand\". Was alert to self and time, not alert to location but was amenable to redirection.   Tolerating Bipap at 50% FiO2. Denies having SOB, CP or any acute complaints this morning. States the her shoulder is painful this morning        Cont meds:    sodium chloride       Scheduled meds:    furosemide  40 mg IntraVENous BID    lidocaine  1 patch TransDERmal Daily    ipratropium 0.5 mg-albuterol 2.5 mg  1 Dose Inhalation 4x Daily RT    budesonide  0.5 mg Nebulization BID RT    arformoterol tartrate  15 mcg Nebulization BID RT    amLODIPine  5 mg Oral Daily    DESMOpressin  200 mcg Oral BID    digoxin  62.5 mcg Oral Daily    DULoxetine  60 mg Oral Daily    apixaban  5 mg Oral BID    ferrous sulfate  325 mg Oral BID    hydrocortisone  10 mg Oral QAM    hydrocortisone  5 mg 
[facility] - Family Medicine Inpatient   Resident Progress Note    S:  Hospital day: 15    Brief Synopsis: Rebekah Rodriguez is a 68 y.o. female with a past medical history of pulmonary hypertension, rectal prolapse, sarcoidosis, permanent A-fib on apixaban, Chronic HFpEF (EF 60%), COPD with chronic hypoxia requiring 6 L of oxygen at home during the night on BiPAP at night, chronic kidney disease, pulmonary embolism, adrenal insufficiency on hydrocortisone and central diabetes insipidus who presented to the emergency department for generalized body aches and bilateral arm pain. Patient was going to be discharged home after shoulder workup was conducted, but she desaturated to the 70's after attempting to take her off the Bipap.  Patient admitted for acute hypoxic respiratory failure likely secondary to CHF exacerbation. Patient was being diuresed with IV Lasix 40 mg daily. Pulmonology was consulted for further recommendations as well who increased IV Lasix 40 mg to BID. Patient developed a pre-renal IRMA and Nephro was consulted for further recommendations.    On 5/17 patient had de-sat to 64% on high flow, delirium and hypersomnolence therefore decision made to place on bipap and continue inpatient management. Intermittently hypoglycemic due to decreased appetite which improved during hospital stay.     Overnight/Interim   No acute overnight events   Patient was seen and examined at bedside this morning. She was resting comfortably in bed with her Bipap mask in place. Still has poor appetite.  Denies fevers, chills, nausea, vomiting, chest pain, abdominal pain or lower extremity pain.   Nurse concerned for L shoulder pain and ortho eval - will order XR.       Cont meds:    dextrose      sodium chloride       Scheduled meds:    DULoxetine  60 mg Oral Daily    furosemide  40 mg Oral Daily    tetrahydrozoline  1 drop Both Eyes TID    acetaminophen  650 mg Oral 3 times per day    Or    acetaminophen  650 mg Rectal 3 times per 
Date: 5/13/2024    Time: 9:37 PM    Patient Placed On BIPAP/CPAP/ Non-Invasive Ventilation?  Yes    If no must comment.  Facial area red/color change? No           If YES are Blister/Lesion present?No   If yes must notify nursing staff  BIPAP/CPAP skin barrier?  Yes    Skin barrier type:mepilexlite       Comments:        Jenny Marrero RCP  
Date: 5/29/2024    Time: 11:12 PM    Patient Placed On BIPAP/CPAP/ Non-Invasive Ventilation?  Yes    If no must comment.  Facial area red/color change? No           If YES are Blister/Lesion present?No   If yes must notify nursing staff  BIPAP/CPAP skin barrier?  Yes    Skin barrier type:mepilexlite       Comments:        Jenny Marrero RCP  
Date: 5/30/2024    Time: 4:53 PM    Patient Placed On BIPAP/CPAP/ Non-Invasive Ventilation?  Yes    If no must comment.  Facial area red/color change? No           If YES are Blister/Lesion present?No   If yes must notify nursing staff  BIPAP/CPAP skin barrier?  Yes    Skin barrier type:mepilexlite       Elyse Jamilz, [facility]     05/30/24 0153   Assessment   Comfort Level Good   Using Accessory Muscles No   Mask Compliance Good   Skin Assessment Clean, dry, & intact   Skin Protection for O2 Device Yes   Orientation Middle   Location Nose   Intervention(s) Skin Barrier   Settings/Measurements   PIP Observed 17 cm H20   CPAP/EPAP 6 cmH2O   IPAP Min 20 cmH2O   IPAP Max 30 cmH2O   Vt (Set, mL) 450 mL   Vt (Measured) 475 mL   Rate Ordered 14   Insp Rise Time (%) 3 %   FiO2  (S)  70 %   I Time/ I Time % 0.8 s   Minute Volume (L/min) 11.7 Liters   Mask Leak (lpm) 52 lpm   Patient's Home Machine No   Alarm Settings   Alarms On Y   Low Pressure (cmH2O) 8 cmH2O   High Pressure (cmH2O) 30 cmH2O   Apnea (secs) 20 secs   RR Low (bpm) 14   RR High (bpm) 35 br/min       
[facility] - Family Medicine Inpatient   Resident Progress Note    S:  Hospital day: 14    Brief Synopsis: Rebekah Rodriguez is a 68 y.o. female with a past medical history of pulmonary hypertension, rectal prolapse, sarcoidosis, permanent A-fib on apixaban, Chronic HFpEF (EF 60%), COPD with chronic hypoxia requiring 6 L of oxygen at home during the night on BiPAP at night, chronic kidney disease, pulmonary embolism, adrenal insufficiency on hydrocortisone and central diabetes insipidus who presented to the emergency department for generalized body aches and bilateral arm pain. Patient was going to be discharged home after shoulder workup was conducted, but she desaturated to the 70's after attempting to take her off the Bipap.  Patient admitted for acute hypoxic respiratory failure likely secondary to CHF exacerbation. Patient was being diuresed with IV Lasix 40 mg daily. Pulmonology was consulted for further recommendations as well who increased IV Lasix 40 mg to BID. Patient developed a pre-renal IRMA and Nephro was consulted for further recommendations.    On 5/17 patient had de-sat to 64% on high flow, delirium and hypersomnolence therefore decision made to place on bipap and continue inpatient management. Intermittently hypoglycemic due to decreased appetite which improved during hospital stay.     Overnight/Interim   No acute overnight events   Patient was seen and examined at bedside this morning. She was resting comfortably in bed with her Bipap mask in place. Did not finish dinner yesterday due to appetite.  Denies fevers, chills, nausea, vomiting, chest pain, abdominal pain or lower extremity pain.        Cont meds:    dextrose      sodium chloride       Scheduled meds:    DULoxetine  60 mg Oral Daily    furosemide  40 mg Oral Daily    tetrahydrozoline  1 drop Both Eyes TID    acetaminophen  650 mg Oral 3 times per day    Or    acetaminophen  650 mg Rectal 3 times per day    lidocaine  1 patch TransDERmal Daily 
Department of Internal Medicine  Nephrology Nurse Practitioner Consult Note    Events reviewed.    SUBJECTIVE: We are following Rebekah Rodriguez for IRMA. Patient reports no complaints.    PHYSICAL EXAM:      Vitals:    VITALS:  /84   Pulse 74   Temp 97.5 °F (36.4 °C) (Axillary)   Resp 18   Ht 1.524 m (5')   Wt 63 kg (138 lb 14.4 oz)   LMP  (LMP Unknown)   SpO2 99%   PF 71 L/min   BMI 27.13 kg/m²   24HR INTAKE/OUTPUT:    Intake/Output Summary (Last 24 hours) at 5/19/2024 1124  Last data filed at 5/19/2024 0644  Gross per 24 hour   Intake 442 ml   Output 500 ml   Net -58 ml     Constitutional:  Awake, alert, oriented, in NAD  HEENT:  PERRLA, normocephalic, atraumatic  Respiratory:  CTA  Cardiovascular/Edema:  RRR, normal S1, normal S2  Gastrointestinal:  Soft, flat, non-distended, non-tender  Neurologic:  Nonfocal  Skin:  warm, dry, no rashes, no lesions    Scheduled Meds:   DULoxetine  30 mg Oral Daily    furosemide  40 mg IntraVENous Daily    furosemide  40 mg IntraVENous Once    acetaminophen  650 mg Oral 3 times per day    Or    acetaminophen  650 mg Rectal 3 times per day    lidocaine  1 patch TransDERmal Daily    ipratropium 0.5 mg-albuterol 2.5 mg  1 Dose Inhalation 4x Daily RT    budesonide  0.5 mg Nebulization BID RT    arformoterol tartrate  15 mcg Nebulization BID RT    amLODIPine  5 mg Oral Daily    DESMOpressin  200 mcg Oral BID    digoxin  62.5 mcg Oral Daily    apixaban  5 mg Oral BID    ferrous sulfate  325 mg Oral BID    hydrocortisone  10 mg Oral QAM    hydrocortisone  5 mg Oral Nightly    levETIRAcetam  500 mg Oral BID    levothyroxine  50 mcg Oral Daily    metoprolol succinate  50 mg Oral BID    omega-3 acid ethyl esters  2 g Oral BID    multivitamin  1 tablet Oral Daily    sildenafil  10 mg Oral TID    Vitamin D  1,000 Units Oral Daily    sodium chloride flush  5-40 mL IntraVENous 2 times per day    sodium chloride  500 mL IntraVENous Once    diclofenac sodium  2 g Topical BID    
Department of Internal Medicine  Nephrology Progress Note    Events reviewed.    SUBJECTIVE: We are following Rebekah Rodriguez for IRMA. Patient is intubated and sedated.    PHYSICAL EXAM:      Vitals:    VITALS:  BP (!) 110/59   Pulse 98   Temp 98 °F (36.7 °C) (Temporal)   Resp 22   Ht 1.524 m (5')   Wt 64.6 kg (142 lb 6.7 oz)   LMP  (LMP Unknown)   SpO2 93%   PF 71 L/min   BMI 27.81 kg/m²     24HR INTAKE/OUTPUT:    Intake/Output Summary (Last 24 hours) at 6/2/2024 0848  Last data filed at 6/2/2024 0700  Gross per 24 hour   Intake 2499.67 ml   Output 1028 ml   Net 1471.67 ml     Constitutional: Patient is intubated and sedated  HEENT:  PERRLA, normocephalic, atraumatic  Respiratory:  CTA  Cardiovascular/Edema:  RRR, normal S1, normal S2  Gastrointestinal:  Soft, flat, non-distended, non-tender  Neurologic:  Nonfocal  Skin:  warm, dry, no rashes, no lesions    Scheduled Meds:   potassium chloride  40 mEq IntraVENous Q4H    levETIRAcetam  500 mg IntraVENous Q12H    chlorhexidine  15 mL Mouth/Throat BID    artificial tears   Both Eyes Q4H    And    Polyvinyl Alcohol-Povidone PF  2 drop Both Eyes Q4H    hydrocortisone sodium succinate PF  50 mg IntraVENous Q6H    fluconazole  400 mg IntraVENous Q24H    polyethylene glycol  17 g Oral Daily    acetylcysteine  600 mg Inhalation 4x daily    piperacillin-tazobactam  3,375 mg IntraVENous Q8H    pantoprazole (PROTONIX) 40 mg in sodium chloride (PF) 0.9 % 10 mL injection  40 mg IntraVENous Daily    [Held by provider] furosemide  20 mg Oral Daily    lidocaine  1 patch TransDERmal Daily    [Held by provider] DULoxetine  60 mg Oral Daily    acetaminophen  650 mg Oral 3 times per day    Or    acetaminophen  650 mg Rectal 3 times per day    lidocaine  1 patch TransDERmal Daily    ipratropium 0.5 mg-albuterol 2.5 mg  1 Dose Inhalation 4x Daily RT    budesonide  0.5 mg Nebulization BID RT    arformoterol tartrate  15 mcg Nebulization BID RT    [Held by provider] amLODIPine  5 
Department of Internal Medicine  Nephrology Progress Note    Events reviewed.    SUBJECTIVE: We are following Rebekah Rodriguez for IRMA. Patient reports no complaints.    PHYSICAL EXAM:      Vitals:    VITALS:  /78   Pulse 69   Temp 97.8 °F (36.6 °C) (Axillary)   Resp 18   Ht 1.524 m (5')   Wt 63 kg (138 lb 14.4 oz)   LMP  (LMP Unknown)   SpO2 98%   PF 71 L/min   BMI 27.13 kg/m²   24HR INTAKE/OUTPUT:    Intake/Output Summary (Last 24 hours) at 5/20/2024 0958  Last data filed at 5/19/2024 2308  Gross per 24 hour   Intake 10 ml   Output 400 ml   Net -390 ml     Constitutional:  Awake, alert, oriented, in NAD  HEENT:  PERRLA, normocephalic, atraumatic  Respiratory:  CTA  Cardiovascular/Edema:  RRR, normal S1, normal S2  Gastrointestinal:  Soft, flat, non-distended, non-tender  Neurologic:  Nonfocal  Skin:  warm, dry, no rashes, no lesions    Scheduled Meds:   potassium bicarb-citric acid  40 mEq Oral Once    DULoxetine  30 mg Oral Daily    furosemide  40 mg IntraVENous Daily    tetrahydrozoline  1 drop Both Eyes TID    acetaminophen  650 mg Oral 3 times per day    Or    acetaminophen  650 mg Rectal 3 times per day    lidocaine  1 patch TransDERmal Daily    ipratropium 0.5 mg-albuterol 2.5 mg  1 Dose Inhalation 4x Daily RT    budesonide  0.5 mg Nebulization BID RT    arformoterol tartrate  15 mcg Nebulization BID RT    amLODIPine  5 mg Oral Daily    DESMOpressin  200 mcg Oral BID    digoxin  62.5 mcg Oral Daily    apixaban  5 mg Oral BID    ferrous sulfate  325 mg Oral BID    hydrocortisone  10 mg Oral QAM    hydrocortisone  5 mg Oral Nightly    levETIRAcetam  500 mg Oral BID    levothyroxine  50 mcg Oral Daily    metoprolol succinate  50 mg Oral BID    omega-3 acid ethyl esters  2 g Oral BID    multivitamin  1 tablet Oral Daily    sildenafil  10 mg Oral TID    Vitamin D  1,000 Units Oral Daily    sodium chloride flush  5-40 mL IntraVENous 2 times per day    sodium chloride  500 mL IntraVENous Once    
Department of Internal Medicine  Nephrology Progress Note    Events reviewed.    SUBJECTIVE: We are following Rebekah Rodriguez for IRMA. Patient reports no complaints.    PHYSICAL EXAM:      Vitals:    VITALS:  /79   Pulse 62   Temp 97.5 °F (36.4 °C) (Tympanic)   Resp 20   Ht 1.524 m (5')   Wt 63.4 kg (139 lb 12.8 oz)   LMP  (LMP Unknown)   SpO2 98%   PF 71 L/min   BMI 27.30 kg/m²     24HR INTAKE/OUTPUT:  No intake or output data in the 24 hours ending 05/24/24 0945  Constitutional:  Awake, alert, oriented, in NAD  HEENT:  PERRLA, normocephalic, atraumatic  Respiratory:  CTA  Cardiovascular/Edema:  RRR, normal S1, normal S2  Gastrointestinal:  Soft, flat, non-distended, non-tender  Neurologic:  Nonfocal  Skin:  warm, dry, no rashes, no lesions    Scheduled Meds:   furosemide  40 mg Oral Daily    DULoxetine  30 mg Oral Daily    tetrahydrozoline  1 drop Both Eyes TID    acetaminophen  650 mg Oral 3 times per day    Or    acetaminophen  650 mg Rectal 3 times per day    lidocaine  1 patch TransDERmal Daily    ipratropium 0.5 mg-albuterol 2.5 mg  1 Dose Inhalation 4x Daily RT    budesonide  0.5 mg Nebulization BID RT    arformoterol tartrate  15 mcg Nebulization BID RT    amLODIPine  5 mg Oral Daily    DESMOpressin  200 mcg Oral BID    digoxin  62.5 mcg Oral Daily    apixaban  5 mg Oral BID    ferrous sulfate  325 mg Oral BID    hydrocortisone  10 mg Oral QAM    hydrocortisone  5 mg Oral Nightly    levETIRAcetam  500 mg Oral BID    levothyroxine  50 mcg Oral Daily    metoprolol succinate  50 mg Oral BID    omega-3 acid ethyl esters  2 g Oral BID    multivitamin  1 tablet Oral Daily    sildenafil  10 mg Oral TID    Vitamin D  1,000 Units Oral Daily    sodium chloride flush  5-40 mL IntraVENous 2 times per day    sodium chloride  500 mL IntraVENous Once    diclofenac sodium  2 g Topical BID    insulin lispro  0-4 Units SubCUTAneous TID WC    insulin lispro  0-4 Units SubCUTAneous Nightly     Continuous 
Department of Internal Medicine  Nephrology Progress Note    Events reviewed.    SUBJECTIVE: We are following Rebekah Rodriguez for IRMA. Patient reports no complaints.    PHYSICAL EXAM:      Vitals:    VITALS:  /82   Pulse 66   Temp 97.6 °F (36.4 °C) (Axillary)   Resp 22   Ht 1.524 m (5')   Wt 61.2 kg (135 lb)   LMP  (LMP Unknown)   SpO2 97%   PF 71 L/min   BMI 26.37 kg/m²     24HR INTAKE/OUTPUT:    Intake/Output Summary (Last 24 hours) at 5/29/2024 1238  Last data filed at 5/29/2024 0907  Gross per 24 hour   Intake 720 ml   Output 1000 ml   Net -280 ml     Constitutional:  Awake, alert, oriented, in NAD  HEENT:  PERRLA, normocephalic, atraumatic  Respiratory:  CTA  Cardiovascular/Edema:  RRR, normal S1, normal S2  Gastrointestinal:  Soft, flat, non-distended, non-tender  Neurologic:  Nonfocal  Skin:  warm, dry, no rashes, no lesions    Scheduled Meds:   furosemide  20 mg Oral Daily    lidocaine  1 patch TransDERmal Daily    DULoxetine  60 mg Oral Daily    tetrahydrozoline  1 drop Both Eyes TID    acetaminophen  650 mg Oral 3 times per day    Or    acetaminophen  650 mg Rectal 3 times per day    lidocaine  1 patch TransDERmal Daily    ipratropium 0.5 mg-albuterol 2.5 mg  1 Dose Inhalation 4x Daily RT    budesonide  0.5 mg Nebulization BID RT    arformoterol tartrate  15 mcg Nebulization BID RT    amLODIPine  5 mg Oral Daily    DESMOpressin  200 mcg Oral BID    digoxin  62.5 mcg Oral Daily    apixaban  5 mg Oral BID    ferrous sulfate  325 mg Oral BID    hydrocortisone  10 mg Oral QAM    hydrocortisone  5 mg Oral Nightly    levETIRAcetam  500 mg Oral BID    levothyroxine  50 mcg Oral Daily    metoprolol succinate  50 mg Oral BID    omega-3 acid ethyl esters  2 g Oral BID    multivitamin  1 tablet Oral Daily    sildenafil  10 mg Oral TID    Vitamin D  1,000 Units Oral Daily    sodium chloride flush  5-40 mL IntraVENous 2 times per day    sodium chloride  500 mL IntraVENous Once    diclofenac sodium  2 g 
Department of Internal Medicine  Nephrology Progress Note    Events reviewed.    SUBJECTIVE: We are following Rebekah Rodriguez for IRMA. Patient reports no complaints.    PHYSICAL EXAM:      Vitals:    VITALS:  BP (!) 137/92   Pulse 68   Temp 98.2 °F (36.8 °C) (Axillary)   Resp 18   Ht 1.524 m (5')   Wt 62.4 kg (137 lb 9.6 oz)   LMP  (LMP Unknown)   SpO2 95%   PF 71 L/min   BMI 26.87 kg/m²     24HR INTAKE/OUTPUT:    Intake/Output Summary (Last 24 hours) at 5/25/2024 0900  Last data filed at 5/25/2024 0443  Gross per 24 hour   Intake 240 ml   Output 1150 ml   Net -910 ml   Constitutional:  Awake, alert, oriented, in NAD  HEENT:  PERRLA, normocephalic, atraumatic  Respiratory:  CTA  Cardiovascular/Edema:  RRR, normal S1, normal S2  Gastrointestinal:  Soft, flat, non-distended, non-tender  Neurologic:  Nonfocal  Skin:  warm, dry, no rashes, no lesions    Scheduled Meds:   DULoxetine  60 mg Oral Daily    furosemide  40 mg Oral Daily    tetrahydrozoline  1 drop Both Eyes TID    acetaminophen  650 mg Oral 3 times per day    Or    acetaminophen  650 mg Rectal 3 times per day    lidocaine  1 patch TransDERmal Daily    ipratropium 0.5 mg-albuterol 2.5 mg  1 Dose Inhalation 4x Daily RT    budesonide  0.5 mg Nebulization BID RT    arformoterol tartrate  15 mcg Nebulization BID RT    amLODIPine  5 mg Oral Daily    DESMOpressin  200 mcg Oral BID    digoxin  62.5 mcg Oral Daily    apixaban  5 mg Oral BID    ferrous sulfate  325 mg Oral BID    hydrocortisone  10 mg Oral QAM    hydrocortisone  5 mg Oral Nightly    levETIRAcetam  500 mg Oral BID    levothyroxine  50 mcg Oral Daily    metoprolol succinate  50 mg Oral BID    omega-3 acid ethyl esters  2 g Oral BID    multivitamin  1 tablet Oral Daily    sildenafil  10 mg Oral TID    Vitamin D  1,000 Units Oral Daily    sodium chloride flush  5-40 mL IntraVENous 2 times per day    sodium chloride  500 mL IntraVENous Once    diclofenac sodium  2 g Topical BID    insulin lispro  
Department of Internal Medicine  Nephrology Progress Note    Events reviewed.    SUBJECTIVE: We are following Rebekah Rodriguez for IRMA. Patient reports no complaints.    PHYSICAL EXAM:      Vitals:    VITALS:  BP (!) 139/92   Pulse 75   Temp 97.6 °F (36.4 °C) (Axillary)   Resp 16   Ht 1.524 m (5')   Wt 61.7 kg (136 lb)   LMP  (LMP Unknown)   SpO2 100%   PF 71 L/min   BMI 26.56 kg/m²   24HR INTAKE/OUTPUT:    Intake/Output Summary (Last 24 hours) at 5/22/2024 1348  Last data filed at 5/22/2024 1100  Gross per 24 hour   Intake 600 ml   Output 650 ml   Net -50 ml     Constitutional:  Awake, alert, oriented, in NAD  HEENT:  PERRLA, normocephalic, atraumatic  Respiratory:  CTA  Cardiovascular/Edema:  RRR, normal S1, normal S2  Gastrointestinal:  Soft, flat, non-distended, non-tender  Neurologic:  Nonfocal  Skin:  warm, dry, no rashes, no lesions    Scheduled Meds:   furosemide  40 mg Oral Daily    DULoxetine  30 mg Oral Daily    tetrahydrozoline  1 drop Both Eyes TID    acetaminophen  650 mg Oral 3 times per day    Or    acetaminophen  650 mg Rectal 3 times per day    lidocaine  1 patch TransDERmal Daily    ipratropium 0.5 mg-albuterol 2.5 mg  1 Dose Inhalation 4x Daily RT    budesonide  0.5 mg Nebulization BID RT    arformoterol tartrate  15 mcg Nebulization BID RT    amLODIPine  5 mg Oral Daily    DESMOpressin  200 mcg Oral BID    digoxin  62.5 mcg Oral Daily    apixaban  5 mg Oral BID    ferrous sulfate  325 mg Oral BID    hydrocortisone  10 mg Oral QAM    hydrocortisone  5 mg Oral Nightly    levETIRAcetam  500 mg Oral BID    levothyroxine  50 mcg Oral Daily    metoprolol succinate  50 mg Oral BID    omega-3 acid ethyl esters  2 g Oral BID    multivitamin  1 tablet Oral Daily    sildenafil  10 mg Oral TID    Vitamin D  1,000 Units Oral Daily    sodium chloride flush  5-40 mL IntraVENous 2 times per day    sodium chloride  500 mL IntraVENous Once    diclofenac sodium  2 g Topical BID    insulin lispro  0-4 
Department of Internal Medicine  Nephrology Progress Note    Events reviewed.    SUBJECTIVE: We are following Rebekah Rodriguez for IRMA. Patient reports no complaints.    PHYSICAL EXAM:      Vitals:    VITALS:  BP (!) 142/92   Pulse (!) 105   Temp 98 °F (36.7 °C) (Oral)   Resp 24   Ht 1.524 m (5')   Wt 62.8 kg (138 lb 8 oz)   LMP  (LMP Unknown)   SpO2 92%   PF 71 L/min   BMI 27.05 kg/m²   24HR INTAKE/OUTPUT:    Intake/Output Summary (Last 24 hours) at 5/16/2024 0938  Last data filed at 5/16/2024 0608  Gross per 24 hour   Intake 94.8 ml   Output 600 ml   Net -505.2 ml       Scheduled Meds:   furosemide  40 mg Oral Daily    albumin human 25%  12.5 g IntraVENous BID    acetaminophen  650 mg Oral 3 times per day    Or    acetaminophen  650 mg Rectal 3 times per day    lidocaine  1 patch TransDERmal Daily    ipratropium 0.5 mg-albuterol 2.5 mg  1 Dose Inhalation 4x Daily RT    budesonide  0.5 mg Nebulization BID RT    arformoterol tartrate  15 mcg Nebulization BID RT    amLODIPine  5 mg Oral Daily    DESMOpressin  200 mcg Oral BID    digoxin  62.5 mcg Oral Daily    DULoxetine  60 mg Oral Daily    apixaban  5 mg Oral BID    ferrous sulfate  325 mg Oral BID    hydrocortisone  10 mg Oral QAM    hydrocortisone  5 mg Oral Nightly    levETIRAcetam  500 mg Oral BID    levothyroxine  50 mcg Oral Daily    metoprolol succinate  50 mg Oral BID    omega-3 acid ethyl esters  2 g Oral BID    multivitamin  1 tablet Oral Daily    sildenafil  10 mg Oral TID    Vitamin D  1,000 Units Oral Daily    sodium chloride flush  5-40 mL IntraVENous 2 times per day    sodium chloride  500 mL IntraVENous Once    diclofenac sodium  2 g Topical BID    insulin lispro  0-4 Units SubCUTAneous TID WC    insulin lispro  0-4 Units SubCUTAneous Nightly     Continuous Infusions:   sodium chloride       PRN Meds:.sodium chloride flush, albuterol, guaiFENesin, cyclobenzaprine, sodium chloride flush, sodium chloride, ondansetron **OR** ondansetron, 
Department of Internal Medicine  Nephrology Progress Note    Events reviewed.    SUBJECTIVE: We are following Rebekah Rodriguez for IRMA. Patient reports no complaints.    PHYSICAL EXAM:      Vitals:    VITALS:  BP (!) 144/77   Pulse 64   Temp 97.7 °F (36.5 °C) (Axillary)   Resp 18   Ht 1.524 m (5')   Wt 61.8 kg (136 lb 4.8 oz)   LMP  (LMP Unknown)   SpO2 94%   PF (!) 12 L/min   BMI 26.62 kg/m²   24HR INTAKE/OUTPUT:    Intake/Output Summary (Last 24 hours) at 5/15/2024 0940  Last data filed at 5/15/2024 0255  Gross per 24 hour   Intake 480 ml   Output 600 ml   Net -120 ml       Scheduled Meds:   furosemide  40 mg Oral Daily    acetaminophen  650 mg Oral 3 times per day    Or    acetaminophen  650 mg Rectal 3 times per day    lidocaine  1 patch TransDERmal Daily    ipratropium 0.5 mg-albuterol 2.5 mg  1 Dose Inhalation 4x Daily RT    budesonide  0.5 mg Nebulization BID RT    arformoterol tartrate  15 mcg Nebulization BID RT    amLODIPine  5 mg Oral Daily    DESMOpressin  200 mcg Oral BID    digoxin  62.5 mcg Oral Daily    DULoxetine  60 mg Oral Daily    apixaban  5 mg Oral BID    ferrous sulfate  325 mg Oral BID    hydrocortisone  10 mg Oral QAM    hydrocortisone  5 mg Oral Nightly    levETIRAcetam  500 mg Oral BID    levothyroxine  50 mcg Oral Daily    metoprolol succinate  50 mg Oral BID    omega-3 acid ethyl esters  2 g Oral BID    multivitamin  1 tablet Oral Daily    sildenafil  10 mg Oral TID    Vitamin D  1,000 Units Oral Daily    sodium chloride flush  5-40 mL IntraVENous 2 times per day    sodium chloride  500 mL IntraVENous Once    diclofenac sodium  2 g Topical BID    insulin lispro  0-4 Units SubCUTAneous TID WC    insulin lispro  0-4 Units SubCUTAneous Nightly     Continuous Infusions:   sodium chloride       PRN Meds:.sodium chloride flush, albuterol, guaiFENesin, cyclobenzaprine, sodium chloride flush, sodium chloride, ondansetron **OR** ondansetron, polyethylene glycol     Constitutional:  
Department of Internal Medicine  Nephrology Progress Note    Events reviewed.  Patient was transferred to MICU after RRT was called for acute respiratory failure..    SUBJECTIVE: We are following Rebekah Rodriguez for IRMA. Patient reports shortness of breath.    PHYSICAL EXAM:      Vitals:    VITALS:  BP (!) 85/64   Pulse 82   Temp 97.9 °F (36.6 °C) (Axillary)   Resp 23   Ht 1.524 m (5')   Wt 63.8 kg (140 lb 10.5 oz)   LMP  (LMP Unknown)   SpO2 100%   PF 71 L/min   BMI 27.47 kg/m²     24HR INTAKE/OUTPUT:    Intake/Output Summary (Last 24 hours) at 5/31/2024 1456  Last data filed at 5/31/2024 0641  Gross per 24 hour   Intake 306.07 ml   Output 210 ml   Net 96.07 ml     Constitutional: Patient is awake, on BiPAP  HEENT:  PERRLA, normocephalic, atraumatic  Respiratory:  CTA  Cardiovascular/Edema:  RRR, normal S1, normal S2  Gastrointestinal:  Soft, flat, non-distended, non-tender  Neurologic:  Nonfocal  Skin:  warm, dry, no rashes, no lesions    Scheduled Meds:   acetylcysteine  600 mg Inhalation 4x daily    piperacillin-tazobactam  3,375 mg IntraVENous Q8H    methylPREDNISolone  40 mg IntraVENous Daily    vancomycin (VANCOCIN) intermittent dosing (placeholder)   Other RX Placeholder    pantoprazole (PROTONIX) 40 mg in sodium chloride (PF) 0.9 % 10 mL injection  40 mg IntraVENous Daily    furosemide  20 mg Oral Daily    lidocaine  1 patch TransDERmal Daily    DULoxetine  60 mg Oral Daily    tetrahydrozoline  1 drop Both Eyes TID    acetaminophen  650 mg Oral 3 times per day    Or    acetaminophen  650 mg Rectal 3 times per day    lidocaine  1 patch TransDERmal Daily    ipratropium 0.5 mg-albuterol 2.5 mg  1 Dose Inhalation 4x Daily RT    budesonide  0.5 mg Nebulization BID RT    arformoterol tartrate  15 mcg Nebulization BID RT    amLODIPine  5 mg Oral Daily    DESMOpressin  200 mcg Oral BID    digoxin  62.5 mcg Oral Daily    apixaban  5 mg Oral BID    ferrous sulfate  325 mg Oral BID    [Held by provider] 
Dr. Agarwal covering for Dr. Black and notified via perfect serve that ABG results are back.  Also notified pt currently on AVAPS and SaO2 88-90%.  
Dr. Agarwal notified pt desaturated on Airvo and had to be placed back on AVAPS. Current SaO2 82-87% on FiO2 55%. Per Dr. Agarwal, RT to titrate FiO2 to keep SaO2 88-92%. Will obtain ABG in 1 hour per Dr. Agarwal  
[facility] - Family Medicine Inpatient   Resident Progress Note    S:  Hospital day: 6    Brief Synopsis: Rebekah Rodriguez is a 68 y.o. female with a past medical history of pulmonary hypertension, rectal prolapse, sarcoidosis, permanent A-fib on apixaban, Chronic HFpEF (EF 60%), COPD with chronic hypoxia requiring 6 L of oxygen at home during the night on BiPAP at night, chronic kidney disease, pulmonary embolism, adrenal insufficiency on hydrocortisone and central diabetes insipidus who presented to the emergency department for generalized body aches and bilateral arm pain. Patient was going to be discharged home after shoulder workup was conducted, but she desaturated to the 70's after attempting to take her off the Bipap.  Patient admitted for acute hypoxic respiratory failure likely secondary to CHF exacerbation. Patient was being diuresed with IV Lasix 40 mg daily. Pulmonology was consulted for further recommendations as well who increased IV Lasix 40 mg to BID. Patient developed a pre-renal IRMA and Nephro was consulted for further recommendations.    Overnight/Interim   No acute events overnight.   Patient was seen and examined at bedside this morning. States a significant improvement in her SOB. Tolerating Bipap at 40% FiO2. Denies having SOB, CP or any acute complaints this morning. Adamant about leaving this morning.  Was saturating at low 90's while on 9L NC this morning after attempting to get out of bed and ambulate on her own. Desaturated to 80's when placed back on her 5-6L baseline oxygen. Perhaps her baseline O2 has changed and COPD may have worsened to stage IV. Awaiting pulm's final recommendations prior to discharge.      Cont meds:    sodium chloride       Scheduled meds:    furosemide  40 mg Oral Daily    acetaminophen  650 mg Oral 3 times per day    Or    acetaminophen  650 mg Rectal 3 times per day    lidocaine  1 patch TransDERmal Daily    ipratropium 0.5 mg-albuterol 2.5 mg  1 Dose Inhalation 4x 
[facility]  Family Medicine Attending    Hospital Course: 68 y.o. female with presented with shoulder pain after running out of pain medication. She was found to be hypoxic after coming off BIPAP in ER and admitted for acute hypoxic resp failure.     S: patient was transferred to ICU overnight for hypoxia and concern for decompensation.         O: VS- Blood pressure (!) 144/77, pulse 64, temperature 97.7 °F (36.5 °C), temperature source Axillary, resp. rate 18, height 1.524 m (5'), weight 61.8 kg (136 lb 4.8 oz), SpO2 94 %, peak flow (!) 12 L/min    Exam is as noted by resident with the following changes, additions or corrections:  Gen: NAD, awake and alert, on HFNC  HEENT: AT/NC   Resp: coarse, equal  CV irregularly irregular, rate controlled.  Systolic murmur.    Ext trace edema, stable ankle deformities      Impressions/Plan:     1) Acute Hypoxic Resp Failure (multifactorial) . Follow labs. Supplemental O2 and NIV as per pulm recommendations; their management is appreciated. Treatment for HCAP per pulm started yesterday with vanc and zosyn. Cultures pending. Chest xray today shows near total opacification of the left  hemithorax.     2) Concerns for Left Hilar Mass on CXR-  CT chest unremarkable.    3) Shoulder Pain - improved; patient doesn't believe she will tolerate MRI. Given improvement in pain can f/u outpatient MRI vs CT of shoulder.     4) Pulmonary HTN - continue sildenifil.    5) persistent afib- rate controlled.  Eliquis.    6) HFpEF-Nephrology management appreciated.  CXR with likely fluid overload; furosemide,  Improving symptoms and fluid status.      7) HTN-controlled overall, continue current management.    8)IRMA- stable today. Lasix decreased yesterday. Neprho following     9) AMS 2/2 low O2 and hospital stay- has improved recently.      10) Depression - Cymbalta increased     See resident note for further details.     Dispo - continue ICU care          Attending Physician 
[facility] - Family Medicine Inpatient   Resident Progress Note    S:  Hospital day: 1    Brief Synopsis: Rebekah Rodriguez is a 68 y.o. female with a past medical history of pulmonary hypertension, rectal prolapse, sarcoidosis, permanent A-fib on apixaban, Chronic HFpEF (EF 60%), COPD with chronic hypoxia requiring 6 L of oxygen at home during the night on BiPAP at night, chronic kidney disease, pulmonary embolism, adrenal insufficiency on hydrocortisone and central diabetes insipidus who presented to the emergency department for generalized body aches and bilateral arm pain. Patient was going to be discharged home after shoulder workup was conducted, but she desaturated to the 70's after attempting to take her off the Bipap.  Patient admitted for acute hypoxic respiratory failure likely secondary to CHF exacerbation. Patient was being diuresed with IV Lasix 40 mg daily. Pulmonology was consulted for further recommendations as well.     Overnight/Interim   No acute events overnight.   Patient was seen and examined at bedside this morning. Tolerating Bipap at 50% FiO2. Denies having SOB, CP or any acute complaints this morning. States the her shoulder pain is well controlled.        Cont meds:    sodium chloride       Scheduled meds:    ipratropium 0.5 mg-albuterol 2.5 mg  1 Dose Inhalation 4x Daily RT    furosemide  40 mg IntraVENous Daily    budesonide  0.5 mg Nebulization BID RT    arformoterol tartrate  15 mcg Nebulization BID RT    amLODIPine  5 mg Oral Daily    DESMOpressin  200 mcg Oral BID    digoxin  62.5 mcg Oral Daily    DULoxetine  60 mg Oral Daily    apixaban  5 mg Oral BID    ferrous sulfate  325 mg Oral BID    hydrocortisone  10 mg Oral QAM    hydrocortisone  5 mg Oral Nightly    levETIRAcetam  500 mg Oral BID    levothyroxine  50 mcg Oral Daily    metoprolol succinate  50 mg Oral BID    omega-3 acid ethyl esters  2 g Oral BID    multivitamin  1 tablet Oral Daily    sildenafil  10 mg Oral TID    Vitamin D  
IV team notified of need for STAT IV placement via perfect serve  
[facility]  Family Medicine Attending    Hospital Course: 68 y.o. female with presented with shoulder pain after running out of pain medication. She was found to be hypoxic after coming off BIPAP in ER and admitted for acute hypoxic resp failure.     S: this morning patient is awake and jovial.  She states she is feeling so much better.  She is still requiring high flow nasal cannula.    Improvement in creatinine today by holding diuretics.    O: VS- Blood pressure (!) 144/77, pulse 64, temperature 97.7 °F (36.5 °C), temperature source Axillary, resp. rate 18, height 1.524 m (5'), weight 61.8 kg (136 lb 4.8 oz), SpO2 94 %, peak flow (!) 12 L/min    Exam is as noted by resident with the following changes, additions or corrections:  Gen: NAD AAOX3  HEENT: Bipap in place, anicteric  Resp: minimal crackles bilat in bases, apices w/ improved air exchange.   CV irreg irreg      Impressions/Plan:     1) Acute Hypoxic Resp Failure (multifactorial) - bipap prn. Follow labs. Pulm CS - rec BID Lasix, however lasix held  due to IRMA. F/u Cx. ABG as needed; wean O2 as tolerated (baseline 5-6 L nC)    2) Left Hilar Mass-  CT chest unremarkable    3) Shoulder Pain , LEFT AC Joint Nonvascular Structure - patient doesn't believe she will tolerate MRI. Given improvement in pain cna f/u outpatient MRI vs CT of shoulder.     4) Transaminitis - resolved, f/u labs.     5)pulmonary hypertension-continue sildenifil    6) persistent afib- rate controlled.      7) HFpEF-lasix on hold due to mild IRMA    8) HTN-controlled, continue current management    9)IRMA-slowly improving; FeuREA appears pre-renal; had contrast CT chest and started on lasix; creatinine a little better today; nephro recommendations appreciated.    See resident note for further details.          Attending Physician Statement  I have reviewed the chart and seen the patient with the resident(s).  I personally reviewed images, EKG's and similar tests, if 
Lab notified of stat BNP order.  
[facility] - Family Medicine Inpatient   Resident Progress Note    S:  Hospital day: 4    Brief Synopsis: Rebekah Rodriguez is a 68 y.o. female with a past medical history of pulmonary hypertension, rectal prolapse, sarcoidosis, permanent A-fib on apixaban, Chronic HFpEF (EF 60%), COPD with chronic hypoxia requiring 6 L of oxygen at home during the night on BiPAP at night, chronic kidney disease, pulmonary embolism, adrenal insufficiency on hydrocortisone and central diabetes insipidus who presented to the emergency department for generalized body aches and bilateral arm pain. Patient was going to be discharged home after shoulder workup was conducted, but she desaturated to the 70's after attempting to take her off the Bipap.  Patient admitted for acute hypoxic respiratory failure likely secondary to CHF exacerbation. Patient was being diuresed with IV Lasix 40 mg daily. Pulmonology was consulted for further recommendations as well who increased IV Lasix 40 mg to BID. Patient developed a pre-renal IRMA and Nephro was consulted for further recommendations.    Overnight/Interim   No acute events overnight.   Patient was seen and examined at bedside this morning.   Tolerating Bipap at 45% FiO2. Denies having SOB, CP or any acute complaints this morning.      Cont meds:    sodium chloride       Scheduled meds:    acetaminophen  650 mg Oral 3 times per day    Or    acetaminophen  650 mg Rectal 3 times per day    [Held by provider] furosemide  40 mg IntraVENous Daily    lidocaine  1 patch TransDERmal Daily    ipratropium 0.5 mg-albuterol 2.5 mg  1 Dose Inhalation 4x Daily RT    budesonide  0.5 mg Nebulization BID RT    arformoterol tartrate  15 mcg Nebulization BID RT    amLODIPine  5 mg Oral Daily    DESMOpressin  200 mcg Oral BID    digoxin  62.5 mcg Oral Daily    DULoxetine  60 mg Oral Daily    apixaban  5 mg Oral BID    ferrous sulfate  325 mg Oral BID    hydrocortisone  10 mg Oral QAM    hydrocortisone  5 mg Oral Nightly    
[facility]  Family Medicine Attending    Hospital Course: 68 y.o. female with presented with shoulder pain after running out of pain medication. She was found to be hypoxic after coming off BIPAP in ER and admitted for acute hypoxic resp failure.     S: this morning patient is awake and smiling.  Throughout the day today she has had symptoms of delirium and has had increased oxygen requirements.  She uses bipap most of the day, and when off of bipap, needing up to 11 liters high flow.     O: VS- Blood pressure (!) 144/77, pulse 64, temperature 97.7 °F (36.5 °C), temperature source Axillary, resp. rate 18, height 1.524 m (5'), weight 61.8 kg (136 lb 4.8 oz), SpO2 94 %, peak flow (!) 12 L/min    Exam is as noted by resident with the following changes, additions or corrections:  Gen: NAD AAOX3  HEENT: Bipap in place, anicteric  Resp: minimal crackles bilat in bases, apices w/ improved air exchange.   CV irreg irreg      Impressions/Plan:     1) Acute Hypoxic Resp Failure (multifactorial) - bipap prn. Follow labs. Wean O2 as tolerated (baseline 5-6 L nC); increased BNP, worsening oxygen needs; ABG; pulmonology following    2) Left Hilar Mass-  CT chest unremarkable    3) Shoulder Pain , LEFT AC Joint Nonvascular Structure - improved; patient doesn't believe she will tolerate MRI. Given improvement in pain cna f/u outpatient MRI vs CT of shoulder.     4) Transaminitis - resolved, f/u labs.     5)pulmonary hypertension-continue sildenifil    6) persistent afib- rate controlled.      7) HFpEF-lasix was on hold due to mild IRMA, now restarted at her home dose.     8) HTN-controlled, continue current management    9)ERT-ncvjmfzm-vWheUSJ pre-renal; had contrast CT chest and started on lasix; nephro signed off.; nephro gave albumin    10) AMS 2/2 low O2 and hospital stay-waxes and wanes; treating underlying conditions    See resident note for further details.     Dispo:  possible discharge in the next few days 
[facility]  Family Medicine Attending    Hospital Course: 68 y.o. female with presented with shoulder pain after running out of pain medication. She was found to be hypoxic after coming off BIPAP in ER and admitted for acute hypoxic resp failure.     S: sleeping, answered questions briefly, overall improved. Reports improved shoulder pain.     O: VS- Blood pressure 108/79, pulse 76, temperature 98.1 °F (36.7 °C), temperature source Axillary, resp. rate 21, height 1.524 m (5'), weight 58.2 kg (128 lb 3.2 oz), SpO2 92 %, peak flow (!) 12 L/min, not currently breastfeeding.  Exam is as noted by resident with the following changes, additions or corrections:  Gen: NAD AAOX3  HEENT: Bipap in place, anicteric, no facial droop.   Shoulder: limited ROM over bilat shoulders. Abnormal landmarks 2/2 chronic erosive shoulder disease.   Resp: still mild crackles bilat in bases, apices w/ improved air exchange.   CV irreg irreg  Abd +hernia  Ext: BLE Strength 5/5 symmetric    Impressions:   Principal Problem:    Acute congestive heart failure, unspecified heart failure type (HCC)  Active Problems:    Hypoxic respiratory failure (HCC)  Resolved Problems:    * No resolved hospital problems. *      Plan:     1) Acute Hypoxic Resp Failure - bipap prn. Follow labs. Pulm CS - rec BID Lasix. F/u Cx.     2) Left Hilar Mass-  CT chest unremarkable    3) Shoulder Pain , LEFT AC Joint Nonvascular Structure - patient doesn't believe she will tolerate MRI. Given improvement in pain cna f/u outpatient MRI vs CT of shoulder.     4) Transaminitis - resolved, f/u labs.     See resident note for further details.          Attending Physician Statement  I have reviewed the chart and seen the patient with the resident(s).  I personally reviewed images, EKG's and similar tests, if present.  I personally reviewed and performed key elements of the history and exam.  I have reviewed and confirmed the Family Medicine admitting team 
[facility]  Family Medicine Attending    Hospital Course: 68 y.o. female with presented with shoulder pain after running out of pain medication. She was found to be hypoxic after coming off BIPAP in ER and admitted for acute hypoxic resp failure.     S: this morning patient is awake and smiling.  She is dyspneic and pursed lip breathing as her aid had just given her a bath and they were moving her around quite a bit just before we entered the room.  She still states she is feeling so much better.  She is still requiring high flow nasal cannula.  Using bipap when needed and while sleeping which she also does at home.      O: VS- Blood pressure (!) 144/77, pulse 64, temperature 97.7 °F (36.5 °C), temperature source Axillary, resp. rate 18, height 1.524 m (5'), weight 61.8 kg (136 lb 4.8 oz), SpO2 94 %, peak flow (!) 12 L/min    Exam is as noted by resident with the following changes, additions or corrections:  Gen: NAD AAOX3  HEENT: Bipap in place, anicteric  Resp: minimal crackles bilat in bases, apices w/ improved air exchange.   CV irreg irreg      Impressions/Plan:     1) Acute Hypoxic Resp Failure (multifactorial) - improving; bipap prn. Follow labs. Wean O2 as tolerated (baseline 5-6 L nC)    2) Left Hilar Mass-  CT chest unremarkable    3) Shoulder Pain , LEFT AC Joint Nonvascular Structure - improved; patient doesn't believe she will tolerate MRI. Given improvement in pain cna f/u outpatient MRI vs CT of shoulder.     4) Transaminitis - resolved, f/u labs.     5)pulmonary hypertension-continue sildenifil    6) persistent afib- rate controlled.      7) HFpEF-lasix was on hold due to mild IRMA, now restarted at her home dose.     8) HTN-controlled, continue current management    9)IRMA-slowly improving-today 1.1 creatinine; FeuREA pre-renal; had contrast CT chest and started on lasix; nephro recommendations appreciated; nephro started albumin    See resident note for further details.     Dispo:  
[facility]  Family Medicine Attending    Hospital Course: 68 y.o. female with presented with shoulder pain after running out of pain medication. She was found to be hypoxic after coming off BIPAP in ER and admitted for acute hypoxic resp failure.     S: This morning, patient on BPAP and just waking up at time of exam.  Reports symptoms are stable.  No new symptoms or concerns.      O: VS- Blood pressure (!) 144/77, pulse 64, temperature 97.7 °F (36.5 °C), temperature source Axillary, resp. rate 18, height 1.524 m (5'), weight 61.8 kg (136 lb 4.8 oz), SpO2 94 %, peak flow (!) 12 L/min    Exam is as noted by resident with the following changes, additions or corrections:  Gen: NAD, awake and alert, on BPAP   HEENT: AT/NC   Resp: coarse, equal  CV irregularly irregular, rate controlled.  Soft systolic murmur.    A fib on monitor.    Ext trace edema     Impressions/Plan:     1) Acute Hypoxic Resp Failure (multifactorial) - bipap prn. Follow labs. Wean O2 as tolerated (baseline previously 5-6 L NC); increased BNP, worsening oxygen demand.   Pulmonology management appreciated.   Continued diuresis.  CXR somewhat stable today, Cr 1.2.       2) Left Hilar Mass on CXR-  CT chest unremarkable.    3) Shoulder Pain - improved; patient doesn't believe she will tolerate MRI. Given improvement in pain can f/u outpatient MRI vs CT of shoulder.     4) Transaminitis - resolved, f/u labs.     5) Pulmonary HTN - continue sildenifil.    6) persistent afib- rate controlled.  Eliquis.    7) HFpEF-lasix was on hold due to mild IRMA, now restarted.  Nephrology management appreciated.  CXR with likely fluid overload; furosemide, Cr stable today, 1.2.      8) HTN-controlled overall, continue current management.    9)UXD-jaunlhwa-PbnDSD pre-renal; had contrast CT chest and started on lasix; initial improvement.  Nephrology management appreciated.      10) AMS 2/2 low O2 and hospital stay-waxes and wanes; treating underlying 
[facility]  Family Medicine Attending    Hospital Course: 68 y.o. female with presented with shoulder pain after running out of pain medication. She was found to be hypoxic after coming off BIPAP in ER and admitted for acute hypoxic resp failure.     S: patient asks me to leave her alone as she is napping.  I asked how she was feeling and she replied tired.  She is wearing the bipap.  No new concerns.    O: VS- Blood pressure (!) 125/93, pulse 73, temperature 97.5 °F (36.4 °C), temperature source Axillary, resp. rate 21, height 1.524 m (5'), weight 64.8 kg (142 lb 12.8 oz), SpO2 98 %, peak flow (!) 12 L/min, not currently breastfeeding.  Exam is as noted by resident with the following changes, additions or corrections:  Gen: NAD AAOX3  HEENT: Bipap in place, anicteric  Resp: minimal crackles bilat in bases, apices w/ improved air exchange.   CV irreg irreg      Impressions/Plan:     1) Acute Hypoxic Resp Failure (multifactorial) - bipap prn. Follow labs. Pulm CS - rec BID Lasix, however lasix held  due to IRMA. F/u Cx. ABG as needed; wean O2 as tolerated (baseline 5-6 L nC)    2) Left Hilar Mass-  CT chest unremarkable    3) Shoulder Pain , LEFT AC Joint Nonvascular Structure - patient doesn't believe she will tolerate MRI. Given improvement in pain cna f/u outpatient MRI vs CT of shoulder.     4) Transaminitis - resolved, f/u labs.     5)pulmonary hypertension-continue sildenifil    6) persistent afib- rate controlled.      7) HFpEF-lasix on hold due to mild IRMA    8) HTN-controlled, continue current management    9)IRMA-FeuREA appears pre-renal; had contrast CT chest and started on lasix; creatinine worsening; nephro consulted as BNP increasing     See resident note for further details.          Attending Physician Statement  I have reviewed the chart and seen the patient with the resident(s).  I personally reviewed images, EKG's and similar tests, if present.  I personally reviewed and performed 
[facility] - Family Medicine Inpatient   Resident Progress Note    S:  Hospital day: 5    Brief Synopsis: Rebekah Rodriguez is a 68 y.o. female with a past medical history of pulmonary hypertension, rectal prolapse, sarcoidosis, permanent A-fib on apixaban, Chronic HFpEF (EF 60%), COPD with chronic hypoxia requiring 6 L of oxygen at home during the night on BiPAP at night, chronic kidney disease, pulmonary embolism, adrenal insufficiency on hydrocortisone and central diabetes insipidus who presented to the emergency department for generalized body aches and bilateral arm pain. Patient was going to be discharged home after shoulder workup was conducted, but she desaturated to the 70's after attempting to take her off the Bipap.  Patient admitted for acute hypoxic respiratory failure likely secondary to CHF exacerbation. Patient was being diuresed with IV Lasix 40 mg daily. Pulmonology was consulted for further recommendations as well who increased IV Lasix 40 mg to BID. Patient developed a pre-renal IRMA and Nephro was consulted for further recommendations.    Overnight/Interim   No acute events overnight.   Patient was seen and examined at bedside this morning. States a significant improvement in her SOB. Tolerating Bipap at 40% FiO2. Denies having SOB, CP or any acute complaints this morning.      Cont meds:    sodium chloride       Scheduled meds:    furosemide  40 mg Oral Daily    albumin human 25%  12.5 g IntraVENous BID    acetaminophen  650 mg Oral 3 times per day    Or    acetaminophen  650 mg Rectal 3 times per day    lidocaine  1 patch TransDERmal Daily    ipratropium 0.5 mg-albuterol 2.5 mg  1 Dose Inhalation 4x Daily RT    budesonide  0.5 mg Nebulization BID RT    arformoterol tartrate  15 mcg Nebulization BID RT    amLODIPine  5 mg Oral Daily    DESMOpressin  200 mcg Oral BID    digoxin  62.5 mcg Oral Daily    DULoxetine  60 mg Oral Daily    apixaban  5 mg Oral BID    ferrous sulfate  325 mg Oral BID    
Message sent to Palliative team via perfect serve to speak with the pt again regarding intubation wishes per pulm.   
Messaged [facility] re: pt request for eye drops- takes Restasis at home.  
Nephrology notified of new consult via [employer]  
Notified Dr. Cobb of new consult per Dr. Lagos.    TERRY WATKINS RN    
Notified Izabella Mcpherson of patient low BS 65, new order received and given BS rechecked 141.  
Notified the medical residents of the patient oxygen saturations 89-91% after desatting to 79% during portable xray. Also notified them that if we restart the nimbex drip the patient will need a second sedative to run with it. Residents to update orders.  
Occupational Therapy  OCCUPATIONAL THERAPY INITIAL EVALUATION    [facility]  [address], [place]      Date:2024                                                Patient Name: Rebekah Rodriguez  MRN: [id number]  : 1955  Room: [facility]6416-    Evaluating OT: London Pulido OTR/L #[id number]     Referring Provider: Pauline Erazo APRN - CNP   Specific Provider Orders/Date: OT eval and treat 24    Diagnosis: Acute pulmonary edema (HCC) [J81.0]  Acute congestive heart failure, unspecified heart failure type (HCC) [I50.9]  Hypoxic respiratory failure (HCC) [J96.91]   Pt admitted to hospital with SOB and body aches         Pertinent Medical History:  has a past medical history of A-fib (HCC), Abnormal mammogram, Acute on chronic respiratory failure (HCC), Anemia, Anemia due to chronic illness, Ankle fracture, left, 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), Backache, Benign hypertension, CHF (congestive heart failure) (HCC), Chronic back pain, Chronic kidney disease, Chronic pain disorder, Chronic, continuous use of opioids, Compression fracture of thoracic vertebra (HCC), COPD (chronic obstructive pulmonary disease) (HCC), Debility, Deformity of ankle and foot, acquired, Depression, Diabetes insipidus (HCC), Diverticulitis, Dry eyes, Encephalopathy acute, Gait disturbance, GERD (gastroesophageal reflux disease), Hemorrhoids, Hernia, History of blood transfusion, History of Clostridium difficile, Hyperlipidemia, Hyperthyroidism, Hypothyroidism, Ischemic colitis, enteritis, or enterocolitis (HCC), Long term (current) use of systemic steroids, Long-term current use of steroids, Lumbar disc herniation, Myalgia and myositis, unspecified, Nephrosclerosis, Nonunion of fracture, Osteoarthritis, PAF (paroxysmal atrial fibrillation) (HCC), 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HCC), Pulmonary hypertension (HCC), Pulmonary sarcoidosis (HCC), Rectal bleeding, 
Occupational Therapy  OT BEDSIDE TREATMENT NOTE   [facility]  [address] [place], [place]      Date:2024  Patient Name: Rebekah Rodriguez  MRN: [id number]  : 1955  Room: [address] [place] [address]     Attempted OT session this date:    [] unavailable due to other medical staff currently with pt   [x] on hold per nursing staff due to decline in respiratory status, will re attempt when appropriate   [] on hold per nursing staff secondary to lab / radiology results    [] declined treatment  this date due to ____.  Benefits of participation in therapy reviewed with pt.    [] off unit   [] Other:      Continue with current OT P.O.C      Marilynn JENSEN/L [id number]   
[facility]  Family Medicine Attending    Hospital Course: 68 y.o. female with presented with shoulder pain after running out of pain medication. She was found to be hypoxic after coming off BIPAP in ER and admitted for acute hypoxic resp failure.     S: This morning, patient has no new symptoms or concerns, but feels somewhat depressed today. Breathing is mildly worse today. Has not been able to eat due to bipap . Awaiting authorization for new NIV. No new chest pain.      O: VS- Blood pressure (!) 144/77, pulse 64, temperature 97.7 °F (36.5 °C), temperature source Axillary, resp. rate 18, height 1.524 m (5'), weight 61.8 kg (136 lb 4.8 oz), SpO2 94 %, peak flow (!) 12 L/min    Exam is as noted by resident with the following changes, additions or corrections:  Gen: NAD, awake and alert, on HFNC  HEENT: AT/NC   Resp: coarse, equal  CV irregularly irregular, rate controlled.  Systolic murmur.    Ext trace edema, stable ankle deformities      Impressions/Plan:     1) Acute Hypoxic Resp Failure (multifactorial) . Follow labs. Supplemental O2 and NIV as per pulm recommendations; their management is appreciated.      Increased BNP, worsening oxygen demand.  Lasix 40 mg daily with improvement of symptoms.     Cr slightly worsenign to 1.2. will monitor .      2) Concerns for Left Hilar Mass on CXR-  CT chest unremarkable.    3) Shoulder Pain - improved; patient doesn't believe she will tolerate MRI. Given improvement in pain can f/u outpatient MRI vs CT of shoulder.     4) Transaminitis - resolved, f/u labs.     5) Pulmonary HTN - continue sildenifil.    6) persistent afib- rate controlled.  Eliquis.    7) HFpEF-lasix was on hold due to mild IRMA, now restarted.  Nephrology management appreciated.  CXR with likely fluid overload; furosemide,  Improving symptoms and fluid status.      8) HTN-controlled overall, continue current management.    9)QWR-pgwmarer-CcbIMC pre-renal; had contrast CT chest and started 
[facility]   Department of Internal Medicine   Internal Medicine Residency  MICU Progress Note    Patient:  Rebekah Rodriguez 68 y.o. female   MRN: [id number]       Date of Service: 6/3/2024    Allergy: Patient has no known allergies.    Subjective     Overnight, unable to maintain MAP on levophed, vasopressin started. Acidemia worsened, given bicarb amp and ggt started, a fib RVR-amiodarone bolus and ggt started. Suspicion for PE, too unstable to transport to CT.     Patient was seen and examined this morning at bedside. Intubated, sedated, currently maxed on 3 pressors not able to maintain adequate map. Epi ordered. Preparing for HD line placement.      Objective     I & O - 24hr:    Intake/Output Summary (Last 24 hours) at 6/3/2024 1648  Last data filed at 6/3/2024 0740  Gross per 24 hour   Intake 2979.82 ml   Output 225 ml   Net 2754.82 ml     Physical Exam  Vitals: BP (!) 90/57   Pulse 95   Temp 98.5 °F (36.9 °C) (Oral)   Resp 22   Ht 1.524 m (5')   Wt 64.6 kg (142 lb 6.7 oz)   LMP  (LMP Unknown)   SpO2 (!) 89%   PF 71 L/min   BMI 27.81 kg/m²     General Appearance: intubated, sedated, unable to assess neuro status, no distress, no response to pain   HEENT: intubated, oral mucus membranes slightly dry   Neck: trachea midline   Lung: course b/l, poor saturation despite 100% FiO2   Heart: tachycardic 130s irreg irreg   Abdomen: large ventral hernia, soft   Extremities: BLE clubbing deformity     Medications     Continuous Infusions:   amiodarone (CORDARONE) 450 mg in dextrose 5 % 250 mL infusion 0.5 mg/min (06/03/24 0740)    phenylephrine (JOSE RAUL-SYNEPHRINE) 50 mg in sodium chloride 0.9 % 250 mL infusion 300 mcg/min (06/03/24 0940)    sodium chloride      VASOpressin 20 Units in sodium chloride 0.9 % 100 mL infusion 0.04 Units/min (06/03/24 1045)    sodium bicarbonate 50 mEq in dextrose 5 % 1,000 mL infusion 50 mL/hr at 06/03/24 0747    EPINEPHrine 20 mcg/min (06/03/24 0859)    prismaSATE BGK 
[facility]  Family Medicine Attending    Hospital Course: 68 y.o. female with presented with shoulder pain after running out of pain medication. She was found to be hypoxic after coming off BIPAP in ER and admitted for acute hypoxic resp failure.     S: This morning patient reports shortness of breath at baseline. Feels tired.         O: VS- Blood pressure (!) 144/77, pulse 64, temperature 97.7 °F (36.5 °C), temperature source Axillary, resp. rate 18, height 1.524 m (5'), weight 61.8 kg (136 lb 4.8 oz), SpO2 94 %, peak flow (!) 12 L/min    Exam is as noted by resident with the following changes, additions or corrections:  Gen: NAD, awake and alert, on HFNC  HEENT: AT/NC   Resp: coarse, equal  CV irregularly irregular, rate controlled.  Systolic murmur.    Ext trace edema, stable ankle deformities      Impressions/Plan:     1) Acute Hypoxic Resp Failure (multifactorial) . Follow labs. Supplemental O2 and NIV as per pulm recommendations; their management is appreciated. Chest xray today. Discuss with pulm    2) Concerns for Left Hilar Mass on CXR-  CT chest unremarkable.    3) Shoulder Pain - improved; patient doesn't believe she will tolerate MRI. Given improvement in pain can f/u outpatient MRI vs CT of shoulder.     4) Transaminitis - resolved, f/u labs.     5) Pulmonary HTN - continue sildenifil.    6) persistent afib- rate controlled.  Eliquis.    7) HFpEF-Nephrology management appreciated.  CXR with likely fluid overload; furosemide,  Improving symptoms and fluid status.      8) HTN-controlled overall, continue current management.    9)IRMA- worsening. Lasix decreased. Check urine studies.     10) AMS 2/2 low O2 and hospital stay- has improved recently.      11) Depression - Cymbalta increased     See resident note for further details.     Dispo: Plan to discharge when Ledy NIV is at home as recommended by pulmonology.          Attending Physician Statement  I have reviewed the chart and seen 
Palliative Medicine  Progress Note    Pt. Now in the ICU.  Goals and code status have been addressed.  Patient  does not wish to discuss code status further, continue full code and aggressive medical management.  There are no further PM needs at this time.  PM will now sign off.  If new PM needs arise, please re-consult.  Thank you.      Cecilio GALICIA-[facility]  Palliative Medicine    Note: This report was completed using computerStackops voiced recognition software.  Every effort has been made to ensure accuracy; however, inadvertent computerized transcription errors may be present.    
Palliative consult placed and sent via [employer] serve  
Patience NP with pulmonology notified this RN was called to pt room stating that she was \"vomitting\" while on bipap. Upon this RN entering the room, it appeared that there was yellow/brown/green-robb mucus in Bipap mask. Pt states that she \"coughed it up\". Patience also notified that the pt is 78-87% on 15L HF and in 90's while on 15L HF and 15L NRB and that the pt is labored at rest with accessory muscle breathing. Per Patience, orders to be placed. RT notified of need for Airvo and ABG's.  
Patient placed in body bag and prepared for the morgue. Patient Rings placed inside bag on patients chest.  Laith Barry  6/3/24  3:22 PM    
Patient placed on Airvo 60L 100%  
Patient refused to allow me to take her blood sugar.  
Patient removed nasal cannula due to nose bleed. Patient desaturated into the 70's. RN and charge RN placed patient on bipap 100% O2 to recovery. Patient recovered to 98%  
Per midnight notes, patient is too unstable to have ct scan. . Please call CT @[phone] if patient condition changes. Thank you.   
Pharmacy Consultation Note  (Antibiotic Dosing and Monitoring)      Vancomycin has been discontinued; pharmacy will sign-off.  Please reconsult if needed.    Thank you,     Kalin Cisneros, PharmD, [facility]  PGY-1 Pharmacy Resident  [phone]   6/1/2024 3:45 PM    
Pharmacy Consultation Note  (Antibiotic Dosing and Monitoring)    Initial consult date: 5/30/24  Consulting physician/provider: Dr. Newton  Drug: Vancomycin  Indication: Aspiration pneumonia     Age/  Gender Height Weight IBW  Allergy Information   68 y.o./female 152.4 cm (5') 58.3 kg (128 lb 8 oz)     Ideal body weight: 45.5 kg (100 lb 4.9 oz)  Adjusted ideal body weight: 51.8 kg (114 lb 3 oz)   Patient has no known allergies.      Renal Function:  Recent Labs     05/28/24  0446 05/29/24  0438 05/30/24  0452   BUN 34* 43* 44*   CREATININE 1.3* 1.5* 1.3*       Intake/Output Summary (Last 24 hours) at 5/30/2024 1936  Last data filed at 5/30/2024 1742  Gross per 24 hour   Intake 370 ml   Output 300 ml   Net 70 ml       Vancomycin Monitoring:  Trough:  No results for input(s): \"VANCOTROUGH\" in the last 72 hours.  Random:  No results for input(s): \"VANCORANDOM\" in the last 72 hours.    Vancomycin Administration Times:  Recent vancomycin administrations        No vancomycin IV orders with administrations found.            Orders not given:            vancomycin (VANCOCIN) 1,250 mg in sodium chloride 0.9 % 250 mL IVPB    vancomycin (VANCOCIN) intermittent dosing (placeholder)                    Assessment:  Patient is a 68 y.o. female who has been initiated on vancomycin  Estimated Creatinine Clearance: 34 mL/min (A) (based on SCr of 1.3 mg/dL (H)).  To dose vancomycin, pharmacy will be utilizing dosing based off of levels because of patient's renal impairment/insufficiency    Plan:  Will give a one time dose of 1250 mg and dose by levels  Will check vancomycin levels when appropriate  Will continue to monitor renal function   Pharmacy to follow    Thank you for your consult    Jeri Sr PharmD BCPS 5/30/2024 7:36 PM    
Pharmacy Consultation Note  (Antibiotic Dosing and Monitoring)    Initial consult date: 5/30/24  Consulting physician/provider: Dr. Newton  Drug: Vancomycin  Indication: Aspiration pneumonia     Age/  Gender Height Weight IBW  Allergy Information   68 y.o./female 152.4 cm (5') 58.3 kg (128 lb 8 oz)     Ideal body weight: 45.5 kg (100 lb 4.9 oz)  Adjusted ideal body weight: 52.8 kg (116 lb 7.2 oz)   Patient has no known allergies.      Renal Function:  Recent Labs     05/30/24  0452 05/30/24  2149 05/31/24  0436   BUN 44* 32* 34*   CREATININE 1.3* 1.1* 1.3*         Intake/Output Summary (Last 24 hours) at 5/31/2024 0927  Last data filed at 5/31/2024 0641  Gross per 24 hour   Intake 306.07 ml   Output 210 ml   Net 96.07 ml         Vancomycin Monitoring:  Trough:  No results for input(s): \"VANCOTROUGH\" in the last 72 hours.  Random:    Recent Labs     05/31/24 0436   VANCORANDOM 17.1       Vancomycin Administration Times:    Recent vancomycin administrations                     vancomycin (VANCOCIN) 1,250 mg in sodium chloride 0.9 % 250 mL IVPB (mg) 1,250 mg New Bag 05/30/24 2204                  Assessment:  Patient is a 68 y.o. female who has been initiated on vancomycin  Estimated Creatinine Clearance: 35 mL/min (A) (based on SCr of 1.3 mg/dL (H)).  To dose vancomycin, pharmacy will be utilizing dosing based off of levels because of patient's renal impairment/insufficiency  5/31: Scr 1.3, random level is 17.1 mcg/mL (~5.5 hours post dose)    Plan:  Vancomycin 1000 mg IV x 1 dose   Will check vancomycin level tomorrow am on 6/1  Will continue to monitor renal function   Pharmacy to follow    Thank you for your consult,    Tyler Eldridge, PharmD, BCPS, BCCCP 5/31/2024 9:29 AM      
Pharmacy Consultation Note  (Antibiotic Dosing and Monitoring)    Initial consult date: 5/30/24  Consulting physician/provider: Dr. Newton  Drug: Vancomycin  Indication: Aspiration pneumonia     Age/  Gender Height Weight IBW  Allergy Information   68 y.o./female 152.4 cm (5') 58.3 kg (128 lb 8 oz)     Ideal body weight: 45.5 kg (100 lb 4.9 oz)  Adjusted ideal body weight: 53.5 kg (117 lb 15.1 oz)   Patient has no known allergies.      Renal Function:  Recent Labs     05/31/24  0436 06/01/24  0329 06/01/24  0625   BUN 34* 51* 55*   CREATININE 1.3* 1.8* 1.9*         Intake/Output Summary (Last 24 hours) at 6/1/2024 1017  Last data filed at 6/1/2024 0600  Gross per 24 hour   Intake 2864 ml   Output 150 ml   Net 2714 ml         Vancomycin Monitoring:  Trough:  No results for input(s): \"VANCOTROUGH\" in the last 72 hours.  Random:    Recent Labs     05/31/24  0436 06/01/24  0625   VANCORANDOM 17.1 16.7         Vancomycin Administration Times:    Recent vancomycin administrations                     vancomycin (VANCOCIN) 1,000 mg in sodium chloride 0.9 % 250 mL IVPB ([id number]) (mg) 1,000 mg New Bag 05/31/24 1041    vancomycin (VANCOCIN) 1,250 mg in sodium chloride 0.9 % 250 mL IVPB (mg) 1,250 mg New Bag 05/30/24 2204                     Assessment:  Patient is a 68 y.o. female who has been initiated on vancomycin  Estimated Creatinine Clearance: 24 mL/min (A) (based on SCr of 1.9 mg/dL (H)).  To dose vancomycin, pharmacy will be utilizing dosing based off of levels because of patient's renal impairment/insufficiency  5/31: Scr 1.3, random level is 17.1 mcg/mL (~5.5 hours post dose)   6/1: Scr 1.9, UOP 0.1 ml/kg/hr. Vancomycin random level 16.7 mcg/mL at 0625.     Plan:  Hold vancomycin today   Will check vancomycin level tomorrow am on 6/2  Will continue to monitor renal function   Pharmacy to follow    Thank you for your consult,    Kalin Cisneros, PharmD, [facility]  PGY-1 Pharmacy Resident  [phone]  6/1/2024 10:17 
Physical Therapy    PT order received and medical chart reviewed 5/14. Pt was sleeping and on BiPAP upon room entry. Pt politely declined PT evaluation having not slept well last night. Will re-attempt tomorrow. Thank you.    Robbie Christy, PT, DPT  [id number]       
Physical Therapy    Pt on hold this date for physical therapy re-evaluation d/t respiratory status.  Will attempt back as appropriate.  Thank you.     Jameson Nunn, PT, DPT  [id number]     
Pt refuses lab work at this time  
Pt voided x2- Ex Cath in replaced.    Bladder scan 72cc  
Pt. Requesting something stronger for pain, MD Harkins notified via perfect  serve. new orders received. Pt. Aware.  
RN notified Dr. Johnston that patients potassium is 3.1 and there are no PRN orders  
[facility] - Family Medicine Inpatient   Resident Progress Note    S:  Hospital day: 7    Brief Synopsis: Rebekah Rodriguez is a 68 y.o. female with a past medical history of pulmonary hypertension, rectal prolapse, sarcoidosis, permanent A-fib on apixaban, Chronic HFpEF (EF 60%), COPD with chronic hypoxia requiring 6 L of oxygen at home during the night on BiPAP at night, chronic kidney disease, pulmonary embolism, adrenal insufficiency on hydrocortisone and central diabetes insipidus who presented to the emergency department for generalized body aches and bilateral arm pain. Patient was going to be discharged home after shoulder workup was conducted, but she desaturated to the 70's after attempting to take her off the Bipap.  Patient admitted for acute hypoxic respiratory failure likely secondary to CHF exacerbation. Patient was being diuresed with IV Lasix 40 mg daily. Pulmonology was consulted for further recommendations as well who increased IV Lasix 40 mg to BID. Patient developed a pre-renal IRMA and Nephro was consulted for further recommendations.    Overnight/Interim   Yesterday patient had de-sat to 64% on high flow, delirium and hypersomnolence therefore decision made to place on bipap and continue inpatient management  Overnight patient refusing dinner. Glucose checked and was 65. Hypoglycemia protocol started and glucose improved to 141.   Patient was seen and examined at bedside this morning. Endorsing SOB even on Bipap. Per nursing, unable to wean off bipap due to patient de-sat to 70s. Patient refusing meals stating she is not hungry and too SOB to eat. A&Ox3. Sitter at bedside. Denies fevers, chills, chest pain, abdominal pain or lower extremity pain.      Cont meds:    dextrose      sodium chloride       Scheduled meds:    furosemide  40 mg Oral Daily    acetaminophen  650 mg Oral 3 times per day    Or    acetaminophen  650 mg Rectal 3 times per day    lidocaine  1 patch TransDERmal Daily    ipratropium 
[facility]  Family Medicine Attending    Hospital Course: 68 y.o. female with presented with shoulder pain after running out of pain medication. She was found to be hypoxic after coming off BIPAP in ER and admitted for acute hypoxic resp failure.     S: This morning, patient is resting, using BPAP, but reportedly was more awake and alert earlier.      O: VS- Blood pressure (!) 144/77, pulse 64, temperature 97.7 °F (36.5 °C), temperature source Axillary, resp. rate 18, height 1.524 m (5'), weight 61.8 kg (136 lb 4.8 oz), SpO2 94 %, peak flow (!) 12 L/min    Exam is as noted by resident with the following changes, additions or corrections:  Gen: NAD, awakens to voice but drowsy, resting on BPAP  HEENT: AT/NC   Resp: coarse, equal  CV Irregular, rate controlled, somewhat distant.  A fib on monitor.    Ext trace edema     Impressions/Plan:     1) Acute Hypoxic Resp Failure (multifactorial) - bipap prn. Follow labs. Wean O2 as tolerated (baseline 5-6 L nC); increased BNP, worsening oxygen needs.   Pulmonology management appreciated.   Continued diuresis.  CXR somewhat improved today, Cr 1.1.       2) Left Hilar Mass on CXR-  CT chest unremarkable.    3) Shoulder Pain - improved; patient doesn't believe she will tolerate MRI. Given improvement in pain can f/u outpatient MRI vs CT of shoulder.     4) Transaminitis - resolved, f/u labs.     5) Pulmonary HTN - continue sildenifil.    6) persistent afib- rate controlled.  Eliquis.    7) HFpEF-lasix was on hold due to mild IRMA, now restarted.  Nephrology management appreciated.  CXR with likely fluid overload; furosemide, Cr improving today, 1.1.      8) HTN-controlled overall, continue current management.    9)FHX-[id number]-[id number] pre-renal; had contrast CT chest and started on lasix; initial improvement.  Nephrology management appreciated.      10) AMS 2/2 low O2 and hospital stay-waxes and wanes; treating underlying conditions    See resident note for 
[facility] - Family Medicine Inpatient   Resident Progress Note    S:  Hospital day: 23    Brief Synopsis: Rebekah Rodriguez is a 68 y.o. female with a past medical history of pulmonary hypertension, rectal prolapse, sarcoidosis, permanent A-fib on apixaban, Chronic HFpEF (EF 60%), COPD with chronic hypoxia requiring 6 L of oxygen at home during the night on BiPAP at night, chronic kidney disease, pulmonary embolism, adrenal insufficiency on hydrocortisone and central diabetes insipidus who presented to the emergency department for generalized body aches and bilateral arm pain. Patient was going to be discharged home after shoulder workup was conducted, but she desaturated to the 70's after attempting to take her off the Bipap.  Patient admitted for acute hypoxic respiratory failure likely secondary to CHF exacerbation. Patient was being diuresed with IV Lasix 40 mg daily. Pulmonology was consulted. increased IV Lasix 40 mg to BID. Patient developed a pre-renal IRMA and Nephro was consulted. On 5/17 patient had de-sat to 64% on high flow, delirium and hypersomnolence; she was placed on bipap. Intermittently hypoglycemic due to decreased appetite which improved during hospital stay. RRT 5/30 due to hypoxia on airvo 100% at 60L and 15 L NRB; transferred to MICU. Received Zosyn due possible aspiration pneumonia. On 6/1/2024, the patient experienced two PEA arrests- was intubated at that time.    Interim/Overnight:  Patient began desaturating, was placed on Nimbex, continuing to desaturate to 83-84%  Patient also in afib RVR, amiodarone drip started, but condition persists   D/t worsening acidemia, nephrology recommended HD- patient's  initially declined and wanted patient to be DNR-CCA, has now changed his mind and wants patient full code with initiation of HD  When seen in ICU, patient was intubated and sedated. Not responding to sternal rub       Cont meds:    amiodarone (CORDARONE) 450 mg in dextrose 5 % 250 mL 
[facility]  Family Medicine Attending    Hospital Course: 68 y.o. female with presented with shoulder pain after running out of pain medication. She was found to be hypoxic after coming off BIPAP in ER and admitted for acute hypoxic resp failure.     S: This morning, patient has no new symptoms or concerns, has persistent left shoulder pain.     O: VS- Blood pressure (!) 144/77, pulse 64, temperature 97.7 °F (36.5 °C), temperature source Axillary, resp. rate 18, height 1.524 m (5'), weight 61.8 kg (136 lb 4.8 oz), SpO2 94 %, peak flow (!) 12 L/min    Exam is as noted by resident with the following changes, additions or corrections:  Gen: NAD, awake and alert, on HFNC  HEENT: AT/NC   Resp: coarse, equal  CV irregularly irregular, rate controlled.  Systolic murmur.    Ext trace edema, stable ankle deformities      Impressions/Plan:     1) Acute Hypoxic Resp Failure (multifactorial) . Follow labs. Supplemental O2 and NIV as per pulm recommendations; their management is appreciated.      Increased BNP, worsening oxygen demand.  Lasix 40 mg daily with improvement of symptoms.     Cr slightly worsenign to 1.2. will monitor .      2) Concerns for Left Hilar Mass on CXR-  CT chest unremarkable.    3) Shoulder Pain - improved; patient doesn't believe she will tolerate MRI. Given improvement in pain can f/u outpatient MRI vs CT of shoulder.     4) Transaminitis - resolved, f/u labs.     5) Pulmonary HTN - continue sildenifil.    6) persistent afib- rate controlled.  Eliquis.    7) HFpEF-Nephrology management appreciated.  CXR with likely fluid overload; furosemide,  Improving symptoms and fluid status.      8) HTN-controlled overall, continue current management.    9)YSB-[id number]-SklZMF pre-renal; had contrast CT chest and started on lasix; Cr stable at 1.2, labs pending.      10) AMS 2/2 low O2 and hospital stay- has improved recently.      11) Depression - Cymbalta increased     See resident note for 
[facility] - Family Medicine Inpatient   Resident Progress Note    S:  Hospital day: 11    Brief Synopsis: Rebekah Rodriguez is a 68 y.o. female with a past medical history of pulmonary hypertension, rectal prolapse, sarcoidosis, permanent A-fib on apixaban, Chronic HFpEF (EF 60%), COPD with chronic hypoxia requiring 6 L of oxygen at home during the night on BiPAP at night, chronic kidney disease, pulmonary embolism, adrenal insufficiency on hydrocortisone and central diabetes insipidus who presented to the emergency department for generalized body aches and bilateral arm pain. Patient was going to be discharged home after shoulder workup was conducted, but she desaturated to the 70's after attempting to take her off the Bipap.  Patient admitted for acute hypoxic respiratory failure likely secondary to CHF exacerbation. Patient was being diuresed with IV Lasix 40 mg daily. Pulmonology was consulted for further recommendations as well who increased IV Lasix 40 mg to BID. Patient developed a pre-renal IRMA and Nephro was consulted for further recommendations.    On 5/17 patient had de-sat to 64% on high flow, delirium and hypersomnolence therefore decision made to place on bipap and continue inpatient management. Intermittently hypoglycemic due to decreased appetite which improved during hospital stay.     Overnight/Interim   No acute overnight events   Patient was seen and examined at bedside this morning. She was resting comfortably in bed and saturating in low 90's on 8L HFNC. States that she hasn't been as SOB as she previously was.   Echo results with worsening Pulmonary HTN  Denies fevers, chills, nausea, vomiting, chest pain, abdominal pain or lower extremity pain.        Cont meds:    dextrose      sodium chloride       Scheduled meds:    furosemide  40 mg Oral Daily    DULoxetine  30 mg Oral Daily    tetrahydrozoline  1 drop Both Eyes TID    acetaminophen  650 mg Oral 3 times per day    Or    acetaminophen  650 mg 
[facility] - Family Medicine Inpatient   Resident Progress Note    S:  Hospital day: 22    Brief Synopsis: Rebekah Rodriguez is a 68 y.o. female with a past medical history of pulmonary hypertension, rectal prolapse, sarcoidosis, permanent A-fib on apixaban, Chronic HFpEF (EF 60%), COPD with chronic hypoxia requiring 6 L of oxygen at home during the night on BiPAP at night, chronic kidney disease, pulmonary embolism, adrenal insufficiency on hydrocortisone and central diabetes insipidus who presented to the emergency department for generalized body aches and bilateral arm pain. Patient was going to be discharged home after shoulder workup was conducted, but she desaturated to the 70's after attempting to take her off the Bipap.  Patient admitted for acute hypoxic respiratory failure likely secondary to CHF exacerbation. Patient was being diuresed with IV Lasix 40 mg daily. Pulmonology was consulted. increased IV Lasix 40 mg to BID. Patient developed a pre-renal IRMA and Nephro was consulted. On 5/17 patient had de-sat to 64% on high flow, delirium and hypersomnolence; she was placed on bipap. Intermittently hypoglycemic due to decreased appetite which improved during hospital stay. RRT 5/30 due to hypoxia on airvo 100% at 60L and 15 L NRB; transferred to MICU. Received Zosyn due possible aspiration pneumonia.     No acute events overnight. Patient was seen and examined at bedside. Continues to be intubated and sedated. Improvement of saturation     Cont meds:    fentaNYL 150 mcg/hr (06/02/24 0700)    VASOpressin 20 Units in sodium chloride 0.9 % 100 mL infusion Stopped (06/01/24 0244)    epoprostenol 1,500 mcg in sodium chloride 0.9 % 50 mL nebulization solution 50 ng/kg/min (06/02/24 0024)    cisatracurium besylate (NIMBEX) 200 mg in sodium chloride 0.9 % 100 mL infusion 1.5 mcg/kg/min (06/02/24 0700)    norepinephrine 12 mcg/min (06/02/24 0700)    amiodarone 0.5 mg/min (06/02/24 0700)    dextrose 5 % and 0.9 % NaCl 50 
[facility]  Family Medicine Attending    Hospital Course: 68 y.o. female with presented with shoulder pain after running out of pain medication. She was found to be hypoxic after coming off BIPAP in ER and admitted for acute hypoxic resp failure.     S: This morning, patient has no new symptoms or concerns.  States her dyspnea is improving.  More alert today.  Remains on PAP at time of exam.      O: VS- Blood pressure (!) 144/77, pulse 64, temperature 97.7 °F (36.5 °C), temperature source Axillary, resp. rate 18, height 1.524 m (5'), weight 61.8 kg (136 lb 4.8 oz), SpO2 94 %, peak flow (!) 12 L/min    Exam is as noted by resident with the following changes, additions or corrections:  Gen: NAD, awake and alert   HEENT: AT/NC   Resp: coarse, equal  CV irregularly irregular, rate controlled.  Systolic murmur.    Ext trace edema     Impressions/Plan:     1) Acute Hypoxic Resp Failure (multifactorial) . Follow labs. Supplemental O2 and NIV as per pulm recommendations; their management is appreciated.      Increased BNP, worsening oxygen demand.  Lasix 40 mg daily with improvement of symptoms.  UO not accurate today due to issue with pure wick.      2) Left Hilar Mass on CXR-  CT chest unremarkable.    3) Shoulder Pain - improved; patient doesn't believe she will tolerate MRI. Given improvement in pain can f/u outpatient MRI vs CT of shoulder.     4) Transaminitis - resolved, f/u labs.     5) Pulmonary HTN - continue sildenifil.    6) persistent afib- rate controlled.  Eliquis.    7) HFpEF-lasix was on hold due to mild IRMA, now restarted.  Nephrology management appreciated.  CXR with likely fluid overload; furosemide, Cr stable today, 1.2.  Improving symptoms and fluid status.      8) HTN-controlled overall, continue current management.    9)JWZ-wmugritp-FxrMPA pre-renal; had contrast CT chest and started on lasix; Cr stable at 1.2 today.  Nephrology management appreciated.      10) AMS 2/2 low O2 and 
[facility]  Family Medicine Attending    Hospital Course: 68 y.o. female with presented with shoulder pain after running out of pain medication. She was found to be hypoxic after coming off BIPAP in ER and admitted for acute hypoxic resp failure.     S: She reports she is breating OK near baseline. She doesn't think she will tolerate an MRI of her shoulder.     O: VS- Blood pressure (!) 158/98, pulse 81, temperature 97.3 °F (36.3 °C), temperature source Axillary, resp. rate 19, height 1.524 m (5'), weight 58.3 kg (128 lb 8 oz), SpO2 97 %, not currently breastfeeding.  Exam is as noted by resident with the following changes, additions or corrections:  Gen: NAD AAOX3  HEENT: Bipap in place, anicteric, no facial droop.   Shoulder: limited ROM over bilat shoulders. Abnormal landmarks 2/2 chronic erosive shoulder disease.   Resp: mild crackles bilat in bases, apices w/ improved air exchange.   CV irreg irreg  Abd +hernia  Ext: BLE Strength 5/5 symmetric    Impressions:   Principal Problem:    Acute congestive heart failure, unspecified heart failure type (HCC)  Active Problems:    Hypoxic respiratory failure (HCC)  Resolved Problems:    * No resolved hospital problems. *      Plan:     1) Acute Hypoxic Resp Failure - bipap prn. Follow labs. Pulm CS.    2) Left Hilar Mass-  CT chest unremarkable    3) Shoulder Pain , LEFT AC Joint Nonvascular Structure - patient doesn't believe she will tolerate MRI. Pending over-read of chest CT. Pain control. May need to f/u for further eval. Less likely abscess given lack of systemic findings currently.     4) Transaminitis - f/u labs.     See resident note for further details.          Attending Physician Statement  I have reviewed the chart and seen the patient with the resident(s).  I personally reviewed images, EKG's and similar tests, if present.  I personally reviewed and performed key elements of the history and exam.  I have reviewed and confirmed the Family 
[facility] - Family Medicine Inpatient   Resident Progress Note    S:  Hospital day: 16    Brief Synopsis: Rebekah Rodriguez is a 68 y.o. female with a past medical history of pulmonary hypertension, rectal prolapse, sarcoidosis, permanent A-fib on apixaban, Chronic HFpEF (EF 60%), COPD with chronic hypoxia requiring 6 L of oxygen at home during the night on BiPAP at night, chronic kidney disease, pulmonary embolism, adrenal insufficiency on hydrocortisone and central diabetes insipidus who presented to the emergency department for generalized body aches and bilateral arm pain. Patient was going to be discharged home after shoulder workup was conducted, but she desaturated to the 70's after attempting to take her off the Bipap.  Patient admitted for acute hypoxic respiratory failure likely secondary to CHF exacerbation. Patient was being diuresed with IV Lasix 40 mg daily. Pulmonology was consulted for further recommendations as well who increased IV Lasix 40 mg to BID. Patient developed a pre-renal IRMA and Nephro was consulted for further recommendations.    On 5/17 patient had de-sat to 64% on high flow, delirium and hypersomnolence therefore decision made to place on bipap and continue inpatient management. Intermittently hypoglycemic due to decreased appetite which improved during hospital stay.     Overnight/Interim   Rechecked XR of L shoulder with no acute change  Patient was seen and examined at bedside this morning. She was resting comfortably in bed with her Bipap mask in place. Still has poor appetite.  Denies fevers, chills, nausea, vomiting, chest pain, abdominal pain or lower extremity pain.        Cont meds:    dextrose      sodium chloride       Scheduled meds:    DULoxetine  60 mg Oral Daily    furosemide  40 mg Oral Daily    tetrahydrozoline  1 drop Both Eyes TID    acetaminophen  650 mg Oral 3 times per day    Or    acetaminophen  650 mg Rectal 3 times per day    lidocaine  1 patch TransDERmal Daily    
Spoke with Dr. Black while on unit who states that the pt needs to be weaned down to baseline O2 as tolerated and encouraged to only use AVAPS as needed during the day. Will assess pt tomorrow and eval for discharge at that time. Family Med PA notified via perfect serve.  
Spoke with Dr. Lagos while on unit who states that the pt is okay for discharge from nephrology stand point. Schedule follow up appointment in 2 week.   
[facility]  Family Medicine Attending    Hospital Course: 68 y.o. female with presented with shoulder pain after running out of pain medication. She was found to be hypoxic after coming off BIPAP in ER and admitted for acute hypoxic resp failure.     S: This morning, patient has no new symptoms or concerns, but feels somewhat depressed today.  Her home Cymbalta dose was decreased due to IRMA inpatient.  She is somewhat tearful.  Her , Reg, was on the phone with her today and supportive.      O: VS- Blood pressure (!) 144/77, pulse 64, temperature 97.7 °F (36.5 °C), temperature source Axillary, resp. rate 18, height 1.524 m (5'), weight 61.8 kg (136 lb 4.8 oz), SpO2 94 %, peak flow (!) 12 L/min    Exam is as noted by resident with the following changes, additions or corrections:  Gen: NAD, awake and alert, on HFNC  HEENT: AT/NC   Resp: coarse, equal  CV irregularly irregular, rate controlled.  Systolic murmur.    Ext trace edema, stable ankle deformities      Impressions/Plan:     1) Acute Hypoxic Resp Failure (multifactorial) . Follow labs. Supplemental O2 and NIV as per pulm recommendations; their management is appreciated.      Increased BNP, worsening oxygen demand.  Lasix 40 mg daily with improvement of symptoms.       Cr stable, 1.2.  Labs pending today.      2) Concerns for Left Hilar Mass on CXR-  CT chest unremarkable.    3) Shoulder Pain - improved; patient doesn't believe she will tolerate MRI. Given improvement in pain can f/u outpatient MRI vs CT of shoulder.     4) Transaminitis - resolved, f/u labs.     5) Pulmonary HTN - continue sildenifil.    6) persistent afib- rate controlled.  Eliquis.    7) HFpEF-lasix was on hold due to mild IRMA, now restarted.  Nephrology management appreciated.  CXR with likely fluid overload; furosemide, Cr stable, 1.2, labs pending today.  Improving symptoms and fluid status.      8) HTN-controlled overall, continue current 
Stage  CO CI HR SV SVI   Baseline 3.5 2.2 121 29.4 10   Challenge 3.6 2.2 119 30.1 19   Result (%?)        PLR: 3.9%  
This was a RR to 6416. The  and I walked down the dick - and into the RR in motion. The doctors came out to say that the patient needed the ICU. They pulled her out and for 4400.     Understandably, he was upset. He also related the history of the medical issues. He really did not want the vent or ICU - his story all made sense. He loves his wife. I assured him we would give her our best attention.    If you need additional support, you may reach out to us at [facility], [phone].     Sung Strong; CAMPBELL August     
[facility]  Family Medicine Attending    Hospital Course: 68 y.o. female with presented with shoulder pain after running out of pain medication. She was found to be hypoxic after coming off BIPAP in ER and admitted for acute hypoxic resp failure.     S: This morning patient is again more drowsy, on BPAP.      O: VS- Blood pressure (!) 144/77, pulse 64, temperature 97.7 °F (36.5 °C), temperature source Axillary, resp. rate 18, height 1.524 m (5'), weight 61.8 kg (136 lb 4.8 oz), SpO2 94 %, peak flow (!) 12 L/min    Exam is as noted by resident with the following changes, additions or corrections:  Gen: NAD, awakens to voice but drowsy, resting on BPAP  HEENT: AT/NC   Resp: coarse, equal  CV Irregular, rate controlled, somewhat distant   Ext trace edema     Impressions/Plan:     1) Acute Hypoxic Resp Failure (multifactorial) - bipap prn. Follow labs. Wean O2 as tolerated (baseline 5-6 L nC); increased BNP, worsening oxygen needs.   Pulmonology management appreciated.   Continued diuresis.  CXR with increased edema today.       2) Left Hilar Mass-  CT chest unremarkable.    3) Shoulder Pain - improved; patient doesn't believe she will tolerate MRI. Given improvement in pain can f/u outpatient MRI vs CT of shoulder.     4) Transaminitis - resolved, f/u labs.     5) Pulmonary HTN - continue sildenifil.    6) persistent afib- rate controlled.      7) HFpEF-lasix was on hold due to mild IRMA, now restarted.  Nephrology management appreciated.  CXR with likely fluid overload; Cr increase noted.      8) HTN-controlled overall, continue current management.    9)STL-qxpckywz-UvyTED pre-renal; had contrast CT chest and started on lasix; initial improvement.  Increase in Cr with diuretics.  Follow.  Nephrology management appreciated.      10) AMS 2/2 low O2 and hospital stay-waxes and wanes; treating underlying conditions    See resident note for further details.     Dispo:  Following for 
[facility]  Family Medicine Attending    Hospital Course: 68 y.o. female with presented with shoulder pain after running out of pain medication. She was found to be hypoxic after coming off BIPAP in ER and admitted for acute hypoxic resp failure. Transferred to ICU for hypoxia and concern for decompensation.     S: intubated and sedated.      O: VS- Blood pressure (!) 144/77, pulse 64, temperature 97.7 °F (36.5 °C), temperature source Axillary, resp. rate 18, height 1.524 m (5'), weight 61.8 kg (136 lb 4.8 oz), SpO2 94 %, peak flow (!) 12 L/min    Exam is as noted by resident with the following changes, additions or corrections:  Gen: Intubated / sedated. Unresponsive on my rounds.   HEENT: AT/NC   Resp: coarse, equal  Abd: large ventral hernia  CV irregularly irregular, rate controlled.  Systolic murmur.    Ext  edema, stable ankle deformities      Impressions/Plan:     Appreciate MICU Primary management.     1) Acute Hypoxic Resp Failure (multifactorial) . Intubated, Follow MICU team recs.     2) IRMA, Metabolic Acidemia - Nephro following pending HD. Per notes MICU attempted to place HD catheter but patient coded.     3) Septic Shock - pressors per MICU, diflucan/zosyn.     Dispo: Unfortunately very poor prognosis. MICU team has been updating .          Attending Physician Statement  I have reviewed the chart and seen the patient with the resident(s).  I personally reviewed images, EKG's and similar tests, if present.  I personally reviewed and performed key elements of the history and exam.  I have reviewed and confirmed the Family Medicine admitting team resident history and exam with changes as indicated above.  I agree with the assessment, plan, and orders as documented by the  admitting team resident Rico Ellington/Oziel. Please refer to the resident progress note today for additional information.      Garth Harkins MD        
[facility] - Family Medicine Inpatient   Resident Progress Note    S:  Hospital day: 0    Brief Synopsis: Rebekah Rodriguez is a 68 y.o. female with a past medical history of pulmonary hypertension, rectal prolapse, sarcoidosis, permanent A-fib on apixaban, Chronic HFpEF (EF 60%), COPD with chronic hypoxia requiring 6 L of oxygen at home during the night on BiPAP at night, chronic kidney disease, pulmonary embolism, adrenal insufficiency on hydrocortisone and central diabetes insipidus who presented to the emergency department for generalized body aches and bilateral arm pain. Patient was going to be discharged home after shoulder workup was conducted, but she desaturated to the 70's after attempting to take her off the Bipap.  Patient admitted for acute hypoxic respiratory failure likely secondary to CHF exacerbation.    No acute events overnight.  Patient was seen and examined this morning. Feels better. Replacing potassium.       Cont meds:    sodium chloride       Scheduled meds:    potassium chloride  40 mEq Oral Once    ipratropium 0.5 mg-albuterol 2.5 mg  1 Dose Inhalation 4x Daily RT    furosemide  40 mg IntraVENous Daily    budesonide  0.5 mg Nebulization BID RT    arformoterol tartrate  15 mcg Nebulization BID RT    amLODIPine  5 mg Oral Daily    DESMOpressin  200 mcg Oral BID    digoxin  62.5 mcg Oral Daily    DULoxetine  60 mg Oral Daily    apixaban  5 mg Oral BID    ferrous sulfate  325 mg Oral BID    hydrocortisone  10 mg Oral QAM    hydrocortisone  5 mg Oral Nightly    levETIRAcetam  500 mg Oral BID    levothyroxine  50 mcg Oral Daily    metoprolol succinate  50 mg Oral BID    omega-3 acid ethyl esters  2 g Oral BID    multivitamin  1 tablet Oral Daily    sildenafil  10 mg Oral TID    Vitamin D  1,000 Units Oral Daily    sodium chloride flush  5-40 mL IntraVENous 2 times per day    sodium chloride  500 mL IntraVENous Once    diclofenac sodium  2 g Topical BID    insulin lispro  0-4 Units SubCUTAneous TID WC 
Visited with Mrs. Rodriguez while rounding.  When I entered the room Rebekah was lying on her side and alert.  We talked about her health and how she has been in and out of the hospital for the past few years.  We spent time reflecting on her frustrations relating to this.  I also talked to her about her family.  She told me that she recently lost both her parents.  She also told me about her  of 20+ years and one of her siblings.  I asked her about her Druze beliefs and she confirmed that she is of the Judaism kathy.  This was evident throughout our conversation and it was clear that she has been relying on her kathy during her health struggle.  I offered to pray for her and prayed for her health and family.  Rebekah expressed appreciation.    If additional support is requested or needed please reach out to Spiritual [facility] ([phone]).    Chap. Hipolito Blackburn MDIV, [facility]       05/22/24 1039   Encounter Summary   Encounter Overview/Reason Initial Encounter;Spiritual/Emotional Needs   Service Provided For Patient   Referral/Consult From Rounding;Other (comment)  (High Readmission Score)   Support System Spouse;Children;Family members   Last Encounter  05/22/24  (*DS)   Complexity of Encounter Moderate   Spiritual/Emotional needs   Type Spiritual Support   Assessment/Intervention/Outcome   Assessment Calm   Intervention Active listening;Discussed death, afterlife;Discussed relationship with God;Explored/Affirmed feelings, thoughts, concerns;Discussed illness injury and it’s impact;Nurtured Hope;Discussed belief system/Druze practices/kathy;Prayer (assurance of)/[place]   Outcome Coping;Expressed feelings, needs, and concerns;Comfort;Expressed feelings of Meghna, Peace and/or Love;Expressed Gratitude;Encouraged;Engaged in conversation;Receptive   Plan and Referrals   Plan/Referrals Provided reading/devotional materials         
[facility]  Family Medicine Attending    Hospital Course: 68 y.o. female with presented with shoulder pain after running out of pain medication. She was found to be hypoxic after coming off BIPAP in ER and admitted for acute hypoxic resp failure.     S: This morning, patient feels here shortness of breath is worse and overall feels worse than prior. Did eat dinner last night .         O: VS- Blood pressure (!) 144/77, pulse 64, temperature 97.7 °F (36.5 °C), temperature source Axillary, resp. rate 18, height 1.524 m (5'), weight 61.8 kg (136 lb 4.8 oz), SpO2 94 %, peak flow (!) 12 L/min    Exam is as noted by resident with the following changes, additions or corrections:  Gen: NAD, awake and alert, on HFNC  HEENT: AT/NC   Resp: coarse, equal  CV irregularly irregular, rate controlled.  Systolic murmur.    Ext trace edema, stable ankle deformities      Impressions/Plan:     1) Acute Hypoxic Resp Failure (multifactorial) . Follow labs. Supplemental O2 and NIV as per pulm recommendations; their management is appreciated.  Check Chest xray today for worsening shortness of breath. Discuss worsening hypoxia with pulmonology.     2) Concerns for Left Hilar Mass on CXR-  CT chest unremarkable.    3) Shoulder Pain - improved; patient doesn't believe she will tolerate MRI. Given improvement in pain can f/u outpatient MRI vs CT of shoulder.     4) Transaminitis - resolved, f/u labs.     5) Pulmonary HTN - continue sildenifil.    6) persistent afib- rate controlled.  Eliquis.    7) HFpEF-Nephrology management appreciated.  CXR with likely fluid overload; furosemide,  Improving symptoms and fluid status.      8) HTN-controlled overall, continue current management.    9)AQY-clqvxatd-PerWAZ pre-renal; had contrast CT chest and started on lasix; cr mildly worsening 1.3 today. This may be patient's new baseline vs worsening from diuresis.     10) AMS 2/2 low O2 and hospital stay- has improved recently.      11) 
1.2, labs pending.  Nephrology management appreciated.      10) AMS 2/2 low O2 and hospital stay- has improved recently.      11) Depression - follow for labs, could consider increasing Cymbalta if renal function allows.   consulted per patient request.  Continue to follow.      See resident note for further details.     Dispo: Plan to discharge when Ledy NIV is at home as recommended by pulmonology.     Discussed with patient and her  their concerns about transportation options for appointments in [place] with current oxygen requirements.  Team will discuss with .          Attending Physician Statement  I have reviewed the chart and seen the patient with the resident(s).  I personally reviewed images, EKG's and similar tests, if present.  I personally reviewed and performed key elements of the history and exam.  I have reviewed and confirmed the Family Medicine admitting team resident history and exam with changes as indicated above.  I agree with the assessment, plan, and orders as documented by the  admitting team resident Rico Bae/Varun. Please refer to the resident progress note today for additional information.      Umer Johnson MD        
Attending Physician Statement  I have reviewed the chart and seen the patient with the resident(s).  I personally reviewed images, EKG's and similar tests, if present.  I personally reviewed and performed key elements of the history and exam.  I have reviewed and confirmed the Family Medicine admitting team resident history and exam with changes as indicated above.  I agree with the assessment, plan, and orders as documented by the  admitting team resident Dr Bae..  Please refer to the resident progress note today for additional information.      JOESPH GARCIA MD          
Depression - Cymbalta increased    See resident note for further details.    Dispo - continue ICU care         Attending Physician Statement  I have reviewed the chart and seen the patient with the resident(s).  I personally reviewed images, EKG's and similar tests, if present.  I personally reviewed and performed key elements of the history and exam.  I have reviewed and confirmed the Family Medicine admitting team resident history and exam with changes as indicated above.  I agree with the assessment, plan, and orders as documented by the  admitting team resident Rico Bae/Varun. Please refer to the resident progress note today for additional information.      Nam Lyons MD        
PF 71 L/min   BMI 26.37 kg/m²   SpO2 Readings from Last 1 Encounters:   05/29/24 95%        I/O:    Intake/Output Summary (Last 24 hours) at 5/29/2024 1625  Last data filed at 5/29/2024 1309  Gross per 24 hour   Intake 720 ml   Output 1400 ml   Net -680 ml         Vent Information  Ventilator ID: [id number]       IPAP: 14 cmH20  CPAP/EPAP: 8 cmH2O     CURRENT MEDS :  Scheduled Meds:   [START ON 5/30/2024] furosemide  20 mg Oral Daily    lidocaine  1 patch TransDERmal Daily    DULoxetine  60 mg Oral Daily    tetrahydrozoline  1 drop Both Eyes TID    acetaminophen  650 mg Oral 3 times per day    Or    acetaminophen  650 mg Rectal 3 times per day    lidocaine  1 patch TransDERmal Daily    ipratropium 0.5 mg-albuterol 2.5 mg  1 Dose Inhalation 4x Daily RT    budesonide  0.5 mg Nebulization BID RT    arformoterol tartrate  15 mcg Nebulization BID RT    amLODIPine  5 mg Oral Daily    DESMOpressin  200 mcg Oral BID    digoxin  62.5 mcg Oral Daily    apixaban  5 mg Oral BID    ferrous sulfate  325 mg Oral BID    hydrocortisone  10 mg Oral QAM    hydrocortisone  5 mg Oral Nightly    levETIRAcetam  500 mg Oral BID    levothyroxine  50 mcg Oral Daily    metoprolol succinate  50 mg Oral BID    omega-3 acid ethyl esters  2 g Oral BID    multivitamin  1 tablet Oral Daily    sildenafil  10 mg Oral TID    Vitamin D  1,000 Units Oral Daily    sodium chloride flush  5-40 mL IntraVENous 2 times per day    sodium chloride  500 mL IntraVENous Once    diclofenac sodium  2 g Topical BID    insulin lispro  0-4 Units SubCUTAneous TID WC    insulin lispro  0-4 Units SubCUTAneous Nightly       Physical Exam:  General Appearance: appears comfortable in no acute distress.  Slightly hypersomnolent  HEENT: Normocephalic atraumatic without obvious abnormality   Neck: Lips, mucosa, and tongue normal.  Supple, symmetrical, trachea midline, no adenopathy;thyroid:  no enlargement/tenderness/nodules or JVD.  Lung: Breath sounds crackles. Respirations   
WC using stand pivot.    OBJECTIVE:   Initial Evaluation  Date: 5/16/24 Treatment Date: Short Term/ Long Term   Goals   AM-PAC 6 Clicks 11/24     Was pt agreeable to Eval/treatment? Yes     Does pt have pain? No complaints of pain     Bed Mobility  Rolling: NT  Supine to sit: Madi  Sit to supine: Madi  Scooting: Madi to EOB  Rolling: Independent   Supine to sit: Independent   Sit to supine: Independent   Scooting: Independent    Transfers Sit to stand: NT due to desaturation  Stand to sit: NT  Stand pivot: NT  Sit to stand: SBA  Stand to sit: SBA  Stand pivot: SBA with WW   Ambulation   NT  >5 feet with WW with SBA   Stair negotiation: ascended and descended NT  NA   ROM BUE: Refer to OT note  BLE: WFL     Strength BUE: Refer to OT note  BLE: NT     Balance Sitting EOB: SBA  Dynamic Standing: NT  Sitting EOB: Independent   Dynamic Standing: SBA with WW     Pt is A & O x: 4 to person, place, month/year, and situation.   Sensation: Pt denies numbness and tingling of extremities.   Edema: Unremarkable    Patient education  Pt educated on PT role in acute care setting.    Patient response to education:   Pt verbalized understanding Pt demonstrated skill Pt requires further education in this area   Yes NA No      ASSESSMENT:    Conditions Requiring Skilled Therapeutic Intervention:    [x]Decreased strength     []Decreased ROM  [x]Decreased functional mobility  [x]Decreased balance   [x]Decreased endurance   []Decreased posture  []Decreased sensation  []Decreased coordination   []Decreased vision  [x]Decreased safety awareness   []Increased pain       Comments:    Pt was in bed upon room entry; agreeable to PT evaluation. Pt completed all mobility noted above. Pt remained on BiPAP throughout session. Light assist required for mobility. Sitting balance was Fair. O2 sat dropped to 83% on BiPAP but recovered to 90% with rest. Unable to attempt to stand due to fatigue and deconditioning. Pt requested to return to bed. Pt was 
Weight:  (UTO UBW 2/2 multiple wt fluctuations 2/2 fluid shifts ~120's-170's, BAYRON wt loss 2/2 fluids)     Weight Adjustment For: No Adjustment                 BMI Categories: Overweight (BMI 25.0-29.9)    Estimated Daily Nutrient Needs:  Energy Requirements Based On: Formula  Weight Used for Energy Requirements: Admission  Energy (kcal/day): MSJx1.3SF per CBW= 1707-4545  Weight Used for Protein Requirements: Ideal  Protein (g/day): 1.3-1.5gm/kg IBW= 60-70; as tolerated w/ CKD  Method Used for Fluid Requirements: Other (Comment)  Fluid (ml/day): per 1500ml FR    Nutrition Diagnosis:   Severe malnutrition, In context of chronic illness related to catabolic illness (2/2 multiple comorbids) as evidenced by Criteria as identified in malnutrition assessment    Nutrition Interventions:   Food and/or Nutrient Delivery: Continue Current Diet, Modify Oral Nutrition Supplement  Nutrition Education/Counseling: Education not indicated  Coordination of Nutrition Care: Continue to monitor while inpatient       Goals:  Previous Goal Met: Progressing toward Goal(s)  Goals: Meet at least 75% of estimated needs, by next RD assessment       Nutrition Monitoring and Evaluation:   Behavioral-Environmental Outcomes: None Identified  Food/Nutrient Intake Outcomes: Food and Nutrient Intake, Supplement Intake  Physical Signs/Symptoms Outcomes: Biochemical Data, Chewing or Swallowing, GI Status, Fluid Status or Edema, Hemodynamic Status, Meal Time Behavior, Weight, Skin, Nutrition Focused Physical Findings    Discharge Planning:    Too soon to determine     Paul Vaughan RD, LD  Contact: [phone]    
mg Oral BID    levothyroxine  50 mcg Oral Daily    metoprolol succinate  50 mg Oral BID    omega-3 acid ethyl esters  2 g Oral BID    multivitamin  1 tablet Oral Daily    sildenafil  10 mg Oral TID    Vitamin D  1,000 Units Oral Daily    sodium chloride flush  5-40 mL IntraVENous 2 times per day    sodium chloride  500 mL IntraVENous Once    diclofenac sodium  2 g Topical BID    insulin lispro  0-4 Units SubCUTAneous TID WC    insulin lispro  0-4 Units SubCUTAneous Nightly     PRN meds: sodium chloride flush, albuterol, guaiFENesin, cyclobenzaprine, sodium chloride flush, sodium chloride, ondansetron **OR** ondansetron, polyethylene glycol     I reviewed the patient's Past Medical and Surgical History, Medications and Allergies.    O:  VS: BP (!) 123/90   Pulse 71   Temp 98 °F (36.7 °C) (Axillary)   Resp 20   Ht 1.524 m (5')   Wt 64.8 kg (142 lb 12.8 oz)   LMP  (LMP Unknown)   SpO2 98%   PF (!) 12 L/min   BMI 27.89 kg/m²     Physical Exam:  General: Alert, cooperative, bipap  HEENT: Normocephalic, atraumatic. PERRLA, conjunctiva/corneas clear, EOM's intact, no pallor or icterus.   Neck: Supple, symmetrical, trachea midline, no JVD. Thyroid non tender, no obvious masses. No cervical lymphadenopathy.   Chest: No tenderness or deformity, full & symmetric excursion  Lung: trace crackles notes in bilateral lung bases, no wheezing noted today   Heart: irregularly irregular   Abdomen: large abdominal ventral hernia (chronic finding) No TTP with normal BS  Extremities:  Extremities normal, atraumatic, no cyanosis or edema. Distal pulses equal bilaterally  Skin: Skin color, texture, turgor normal, no rashes or lesions  Musculoskeletal: TTP over right shoulder, unable to actively lift her right arm due to pain with limited passive ROM 2/2 to pain. Able to shrug her shoulders with no pain, Neck ROM intact with no pain   Neurologic: Alert & Oriented; CNII-XII intact; Sensation intact  Psychiatric: appropriate affect. 
patient with the resident(s).  I personally reviewed images, EKG's and similar tests, if present.  I personally reviewed and performed key elements of the history and exam.  I have reviewed and confirmed the Family Medicine admitting team resident history and exam with changes as indicated above.  I agree with the assessment, plan, and orders as documented by the  admitting team resident Rico Bae/Varun. Please refer to the resident progress note today for additional information.      Umer Johnson MD        
possible discharge in the next few days; diuresis today with caution, follow.       Attending Physician Statement  I have reviewed the chart and seen the patient with the resident(s).  I personally reviewed images, EKG's and similar tests, if present.  I personally reviewed and performed key elements of the history and exam.  I have reviewed and confirmed the Family Medicine admitting team resident history and exam with changes as indicated above.  I agree with the assessment, plan, and orders as documented by the  admitting team resident Oziel Rios. Please refer to the resident progress note today for additional information.      Fatuma Lizama, DO        
using stand pivot.    OBJECTIVE:   Initial Evaluation  Date: 5/16/24 Treatment Date: 5/22/24 Short Term/ Long Term   Goals   AM-PAC 6 Clicks 11/24 11/24    Was pt agreeable to Eval/treatment? Yes Yes    Does pt have pain? No complaints of pain No complaints of pain    Bed Mobility  Rolling: NT  Supine to sit: Madi  Sit to supine: Madi  Scooting: Madi to EOB Rolling: NT  Supine to sit: SBA  Sit to supine: Madi  Scooting: SBA to EOB Rolling: Independent   Supine to sit: Independent   Sit to supine: Independent   Scooting: Independent    Transfers Sit to stand: NT due to desaturation  Stand to sit: NT  Stand pivot: NT Sit to stand: ModA  Stand to sit: ModA  Stand pivot: NT Sit to stand: SBA  Stand to sit: SBA  Stand pivot: SBA with WW   Ambulation   NT Unable >5 feet with WW with SBA   Stair negotiation: ascended and descended NT NT NA   ROM BUE: Refer to OT note  BLE: WFL NT    Strength BUE: Refer to OT note  BLE: NT NT    Balance Sitting EOB: SBA  Dynamic Standing: NT Sitting EOB: SBA  Dynamic Standing: NT Sitting EOB: Independent   Dynamic Standing: SBA with WW     Pt is A & O x: 4 to person, place, month/year, and situation.   Sensation: Pt denies numbness and tingling of extremities.   Edema: Unremarkable    Patient education  Pt educated on PT role in acute care setting.    Patient response to education:   Pt verbalized understanding Pt demonstrated skill Pt requires further education in this area   Yes NA No     ASSESSMENT:    Comments:    Pt was in bed upon room entry; agreeable to PT treatment. Pt completed all mobility noted above. O2 sat was 81% on 7 L O2/min. O2 flow was increased to 10 L O2/min and saturation levels recovered to 90% within 4 minutes. No assist required for transfer to EOB. Sitting balance was Good. Pt sat at EOB for 15+ minutes. Pt completed x1 sit to stand but could not take steps. O2 sat dropped to 83% but recovered to 90% within 5 minutes. Pt was assisted back to bed and positioned 
   MCH 26.3 06/01/2024 06:24 AM    MCHC 30.4 06/01/2024 06:24 AM    RDW 17.6 06/01/2024 06:24 AM     06/01/2024 06:24 AM    MPV 11.4 06/01/2024 06:24 AM     CMP:    Lab Results   Component Value Date/Time     06/01/2024 06:25 AM    K 3.8 06/01/2024 06:25 AM    K 3.8 07/14/2023 04:12 AM    CL 99 06/01/2024 06:25 AM    CO2 22 06/01/2024 06:25 AM    BUN 55 06/01/2024 06:25 AM    CREATININE 1.9 06/01/2024 06:25 AM    GFRAA >60 10/04/2022 06:02 AM    LABGLOM 28 06/01/2024 06:25 AM    LABGLOM 58 04/28/2024 02:19 PM    GLUCOSE 145 06/01/2024 06:25 AM    GLUCOSE 116 05/02/2012 07:40 PM    CALCIUM 8.9 06/01/2024 06:25 AM    BILITOT 1.2 06/01/2024 03:29 AM    ALKPHOS 130 06/01/2024 03:29 AM     06/01/2024 03:29 AM     06/01/2024 03:29 AM     Magnesium:    Lab Results   Component Value Date/Time    MG 3.9 06/01/2024 06:25 AM     Phosphorus:    Lab Results   Component Value Date/Time    PHOS 6.5 06/01/2024 06:25 AM     Radiology Review:      XR CHEST PORTABLE 5/31/24    IMPRESSION:  1. Near total opacification of the left hemithorax from prior comparison  worsening airspace disease versus atelectasis in the left lung without  significant pleural effusion component.  2. Perihilar opacifications have decreased on the right however significant  increase on the left from prior comparison.        BRIEF SUMMARY OF INITIAL CONSULT:    Briefly Mrs. Rebekah Rodriguez is a 68 y.o. female, well-known to our practice, who follows regularly with Dr. 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  Lab work revealed creatinine 1.5 mg/dL, proBNP 6672, reason for this 
2023), COPD, colitis, hypothyroidism, seizures, NSTEMI, pulmonary hypertension, right IJ thrombosis, PEA arrest, who was admitted on on 5/10/2024 for bilateral arm pain and was shown to be in CHF exacerbation and started on IV Lasix.  Lab work revealed creatinine 1.5 mg/dL, proBNP 6672, reason for this consultation.    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 twice daily  Continue to monitor renal function  Continue to monitor blood pressure  Okay to discharge from renal point of view, follow-up with Dr. Lagos in 2 weeks      Electronically signed by FRIDA Lennon CNP on 5/17/2024 at 10:17 AM     I saw and evaluated the patient, performing the key elements of the service. I discussed the findings, assessment and plan with NP and agree with her findings and plan as annotated and documented in her note.     Owen Lagos MD  
Neuro-muscular re-education: facilitation of righting/equilibrium reactions, midline orientation, scapular stability/mobility, normalization of muscle tone, and facilitation of volitional active controled movement     Recommended Adaptive Equipment:  TBD      Home Living: Pt lives w/  in a 1 story home w/ ramp entry.   Bathroom setup: Walk-in shower   Equipment owned: Shower chair, ww, rollator, SPC, BSC, reacher, adjustable bed, w/c     Prior Level of Function: assist with ADLs , assist with IADLs; engaged in functional mobility with use of  w/c mainly   Driving: No  Occupation: None reported     Pain Level: Pt denies pain this session     Cognition: A&O: 4/4; Follows 2 step directions             Memory:  good             Sequencing:  good             Problem solving:  fair             Judgement/safety:  fair               Functional Assessment:  AM-PAC Daily Activity Raw Score: 15/24    Initial Eval Status  Date: 5/16/24 Treatment Status  Date:5/22/24 STGs = LTGs  Time frame: 10-14 days   Feeding Independent  Independent      Grooming Minimal Assist  SBA  Sitting EOB  Modified [place]    UB Dressing Minimal Assist  Min A  To manage gown sitting EOB  Modified [place]    LB Dressing Maximal Assist Max A  To don brief in supine, pt able to bridge and assist with managing over hips  Minimal Assist    Bathing Maximal Assist  Max A  Per last tx  Minimal Assist    Toileting Dependent  Dependent  Using bed pan and pure wick  Minimal Assist    Bed Mobility  Supine to sit: Minimal Assist   Sit to supine: Minimal Assist  Supine to sit: SBA   Sit to supine: Minimal Assist   Supine to sit: Modified [place]   Sit to supine: Modified [place]    Functional Transfers NT     Pt deferred attempt  Mod A  For sit to stand from EOB, increased time and effort required  Minimal Assist    Functional Mobility NT  NT      Balance Sitting: SBA  Standing: NT Sitting: SBA  Standing: mod A      Activity Tolerance 
atelectasis/pneumonia.  Limitations discussed.    BRIEF SUMMARY OF INITIAL CONSULT:    Briefly Mrs. Rebekah Rodriguez is a 68 y.o. female, well-known to our practice, who follows regularly with Dr. 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  Lab work revealed creatinine 1.5 mg/dL, proBNP 6672, reason for this consultation.    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, on DDAVP     Secondary adrenal insufficiency, 2/2 sarcoidosis induced hypopituitarism, on hydrocortisone and desmopressin     HTN, on metoprolol, amlodipine  HFpEF 60%, proBNP 6672 >5524>8,328 >6089 > 4891>5162  Metabolic alkalosis (pH: 7.511, pCO2 42.2, HCO3 33.8)  -------------------------------------------------------------     Sarcoidosis, pulmonary following   Pulmonary HTN, on sildenafil  PAF, on amiodarone, apixaban, metoprolol  Hypothyroidism, on levothyroxine  H/o seizures, on levetiracetam  Anemia, normocytic, drop in H&H, ferritin 350, iron 30, iron saturation 17%, folate >20, vitamin B12 1069  Type 2 DM, on SSI      Plan:     Continue Lasix 40 mg  p.o. daily  Continue DDAVP 200 mcg p.o. twice daily  Continue amlodipine 5 mg daily  Continue metoprolol succinate 50 mg twice daily   Continue to monitor renal function  Continue to monitor blood pressure      Electronically signed by FRIDA Lennon CNP on 5/30/2024 at 12:10 PM     I saw and evaluated the patient, performing the key elements of the service. I discussed the findings, assessment and 
central DI 2/2 to sarcoidosis treated with DDAVP, HTN, adrenal insufficiency, permanent AF, HFpEF (65-70% May 2023), COPD, colitis, hypothyroidism, seizures, NSTEMI, pulmonary hypertension, right IJ thrombosis, PEA arrest, who was admitted on on 5/10/2024 for bilateral arm pain and was shown to be in CHF exacerbation and started on IV Lasix.  Lab work revealed creatinine 1.5 mg/dL, proBNP 6672, reason for this consultation.    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 twice daily  Continue to monitor renal function  Continue to monitor blood pressure  Okay to discharge from renal point of view, follow-up with Dr. Lagos in 2 weeks  We will follow peripherally       Electronically signed by FRIDA Lennon CNP on 5/18/2024 at 9:51 AM     I saw and evaluated the patient, performing the key elements of the service.  I discussed the findings, assessment and plan with NP and agree with her findings and plan as annotated and documented in her note.      Nai Be MD  
insufficiency, permanent AF, HFpEF (65-70% May 2023), COPD, colitis, hypothyroidism, seizures, NSTEMI, pulmonary hypertension, right IJ thrombosis, PEA arrest, who was admitted on on 5/10/2024 for bilateral arm pain and was shown to be in CHF exacerbation and started on IV Lasix.  Lab work revealed creatinine 1.5 mg/dL, proBNP 6672, reason for this consultation.    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, on DDAVP     Secondary adrenal insufficiency, 2/2 sarcoidosis induced hypopituitarism, on hydrocortisone     HTN, on metoprolol, amlodipine  HFpEF 60%, proBNP 6672 >5524>8,328 >6089  Metabolic alkalosis (pH: 7.491, pCO2 47.5, HCO3 36.3)  -------------------------------------------------------------     Sarcoidosis  Pulmonary HTN, on sildenafil  PAF, on amiodarone, apixaban, metoprolol  Hypothyroidism, on levothyroxine  H/o seizures, on levetiracetam  Anemia, normocytic, drop in H&H, ferritin 350, iron 30, iron saturation 17%, folate >20, vitamin B12 1069       Plan:     Continue Lasix 40 mg  p.o. daily  Continue DDAVP 200 mcg p.o. twice daily  Continue to monitor renal function  Continue to monitor blood pressure  Okay to discharge from renal point of view, follow-up with Dr. Lagos in 2 to 3 weeks, BMP 1 week prior    Electronically signed by FRIDA Lennon CNP on 5/23/2024 at 11:28 AM     I saw and evaluated the patient, performing the key elements of the service. I discussed the findings, assessment and plan with NP and agree with her findings and plan as annotated and documented in her note.     Nai Be MD      
medical history significant for central DI 2/2 to sarcoidosis treated with DDAVP, HTN, adrenal insufficiency, permanent AF, HFpEF (65-70% May 2023), COPD, colitis, hypothyroidism, seizures, NSTEMI, pulmonary hypertension, right IJ thrombosis, PEA arrest, who was admitted on on 5/10/2024 for bilateral arm pain and was shown to be in CHF exacerbation and started on IV Lasix.  Lab work revealed creatinine 1.5 mg/dL, proBNP 6672, reason for this consultation.    IMPRESSION/RECOMMENDATIONS:     Recurrent IRMA stage I, probably hemodynamically mediated due to decompensated heart failure, creatinine stable at 1.2 mg/dL, change Lasix to p.o.     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, on DDAVP     Secondary adrenal insufficiency, 2/2 sarcoidosis induced hypopituitarism, on hydrocortisone     HTN, on metoprolol, amlodipine  HFpEF 60%, proBNP 6672 >5524>8,328 >6089  Metabolic alkalosis (pH: 7.491, pCO2 47.5, HCO3 36.3)  -------------------------------------------------------------     Sarcoidosis  Pulmonary HTN, on sildenafil  PAF, on amiodarone, apixaban, metoprolol  Hypothyroidism, on levothyroxine  H/o seizures, on levetiracetam  Anemia, normocytic, drop in H&H, ferritin 350, iron 30, iron saturation 17%, folate >20, vitamin B12 1069       Plan:     Change Lasix 40 mg to p.o. daily  Continue DDAVP 200 mcg p.o. twice daily  Continue to monitor renal function  Continue to monitor blood pressure    Electronically signed by FRIDA Lennon CNP on 5/21/2024 at 9:19 AM     I saw and evaluated the patient, performing the key elements of the service. I discussed the findings, assessment and plan with NP and agree with her findings and plan as annotated and documented in her note.     Nai Be MD           
04:45 AM    HGB 14.0 04/28/2023 02:54 PM    HCT 29.5 05/12/2024 04:45 AM    HCT 41.0 04/28/2023 02:54 PM    MCV 85.8 05/12/2024 04:45 AM    MCH 25.9 05/12/2024 04:45 AM    MCHC 30.2 05/12/2024 04:45 AM    RDW 16.4 05/12/2024 04:45 AM     05/12/2024 04:45 AM    MPV 10.9 05/12/2024 04:45 AM     Lab Results   Component Value Date/Time     05/12/2024 04:45 AM    K 4.2 05/12/2024 04:45 AM    K 3.8 07/14/2023 04:12 AM     05/12/2024 04:45 AM    CO2 29 05/12/2024 04:45 AM    BUN 18 05/12/2024 04:45 AM    CREATININE 1.0 05/12/2024 04:45 AM    CALCIUM 9.0 05/12/2024 04:45 AM    GFRAA >60 10/04/2022 06:02 AM    LABGLOM 62 05/12/2024 04:45 AM    LABGLOM 58 04/28/2024 02:19 PM     Lab Results   Component Value Date/Time    PROTIME 18.8 05/11/2024 04:41 AM    PROTIME 12.7 05/02/2012 07:40 PM    INR 1.7 05/11/2024 04:41 AM     Recent Labs     05/12/24  0445   PROBNP 6,447*       Recent Labs     05/10/24  1945   PROCAL 0.14*        Assessment:    Acute on Chronic respiratory failure with hypoxia and hypercapnia  Gold stage III severe COPD with exacerbation  LINDSAY on home BiPAP  Severe pulmonary hypertension pre and postcapillary on sildenafil.  Follows at [facility]  History of nicotine dependence in remission  Paroxysmal A-fib on eliquis  History of PE, history of right IJ thrombus on chronic anticoagulation  Secondary adrenal insufficiency chronic Cortef dosing  Diabetes insipidus on DDAVP  History of PEA arrest August 2023  Pulmonary sarcoidosis      Plan:   Wean oxygen as tolerated to patients baseline of 3-4 liters NC  ABG as needed  NIV in the form of BiPAP with home settings of 14/6, BUR 16  follows Nephrology outpatient for management of diabetes insipidus, on chronic DDAVP  Chronic Cortef dosing for adrenal insufficiency  Baseline Lasix dose 20 mg by mouth daily, currently on Lasix 40 mg IV daily. ProBNP 6447. Increase dose to BID  Monitor off antibiotics  Guaifenesin as needed  incentive spirometerjeni 
Cardiomegaly with pulmonary vascular congestion.   2. Bilateral lung base atelectasis.         XR CHEST PORTABLE   Final Result   1. No sign of any definite infiltrate in the retrocardiac left lower lobe.   The appearance of an infiltrate in this region was probably the result of   patient rotation.   2. Stable mild patchy density in the right lung base, probably atelectasis.   3. Stable mild pulmonary fibrosis.   4. 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         XR CHEST PORTABLE   Final Result   1. Dense alveolar infiltrate in the left lung base with small bilateral   pleural effusions.   2. Moderate cardiomegaly.         XR CHEST PORTABLE   Final Result   Bilateral lower lung atelectasis/pneumonia.  Limitations discussed.         CT CHEST W CONTRAST   Final Result   1. No mediastinal or hilar mass.   2. Underlying chronic and emphysematous changes seen within the lung fields.   There is some interseptal thickening and mild ground-glass opacity to suggest   mild interstitial edema.   3. Enlargement of the pulmonary arteries to suggest pulmonary arterial   hypertension.   4. Cardiomegaly.         Vascular duplex upper extremity venous left   Final Result   No evidence of DVT.         US SOFT TISSUE LIMITED AREA   Final Result   1.6 cm x 1.4 cm x 1.2 cm nonvascular complex structure adjacent to the left   AC joint. Correlate with history of prior trauma.      RECOMMENDATION:   MRI may be helpful to further characterize if clinically warranted.         XR CHEST PORTABLE   Final Result   Mild pulmonary edema and small bilateral pleural effusions.      Stable enlargement of the cardiac silhouette.      Rounded soft tissue density left hilum could be due to pulmonary artery   enlargement.  This finding could be more fully evaluated with   contrast-enhanced CT.             A/P:  Principal Problem:    Acute congestive heart failure, unspecified heart failure type (HCC)  Active 
Moderate left ventricular concentric hypertrophy noted.   No regional wall motion abnormalities seen.   Normal left ventricular ejection fraction.   Moderately dilated right ventricle.   Right ventricle global systolic function is reduced.   Moderately enlarged right atrium size.   Mild tricuspid regurgitation.   Pulmonary hypertension is moderate to severe .   There is a small circumferential pericardial effusion noted.   Moderate left pleural effusion.    Labs:  Lab Results   Component Value Date/Time    WBC 4.6 05/15/2024 06:10 AM    RBC 3.38 05/15/2024 06:10 AM    RBC 3.57 12/14/2021 09:57 AM    HGB 8.8 05/15/2024 06:10 AM    HGB 14.0 04/28/2023 02:54 PM    HCT 29.1 05/15/2024 06:10 AM    HCT 41.0 04/28/2023 02:54 PM    MCV 86.1 05/15/2024 06:10 AM    MCH 26.0 05/15/2024 06:10 AM    MCHC 30.2 05/15/2024 06:10 AM    RDW 16.4 05/15/2024 06:10 AM     05/15/2024 06:10 AM    MPV 11.1 05/15/2024 06:10 AM     Lab Results   Component Value Date/Time     05/15/2024 06:10 AM    K 4.7 05/15/2024 06:10 AM    K 3.8 07/14/2023 04:12 AM     05/15/2024 06:10 AM    CO2 30 05/15/2024 06:10 AM    BUN 31 05/15/2024 06:10 AM    CREATININE 1.2 05/15/2024 06:10 AM    CALCIUM 9.0 05/15/2024 06:10 AM    GFRAA >60 10/04/2022 06:02 AM    LABGLOM 50 05/15/2024 06:10 AM    LABGLOM 58 04/28/2024 02:19 PM     Lab Results   Component Value Date/Time    PROTIME 18.8 05/11/2024 04:41 AM    PROTIME 12.7 05/02/2012 07:40 PM    INR 1.7 05/11/2024 04:41 AM     Recent Labs     05/15/24  0610   PROBNP 5,654*         No results for input(s): \"PROCAL\" in the last 72 hours.       Assessment:    Acute on Chronic respiratory failure with hypoxia and hypercapnia  Gold stage III severe COPD with exacerbation  LINDSAY on home BiPAP  Severe pulmonary hypertension pre and postcapillary on sildenafil.  Follows at [facility]  History of nicotine dependence in remission  Paroxysmal A-fib on eliquis  History of PE, history of right IJ thrombus on chronic 
interseptal thickening  and mild ground-glass opacity to suggest mild interstitial edema.  Minimal  atelectatic changes identified lung bases bilaterally.  No definite pleural  effusion or pneumothorax.     Upper Abdomen: Visualized upper abdomen is grossly unremarkable.     Soft Tissues/Bones: Multilevel degenerative changes identified diffusely  throughout the spine.  There is anterior wedging and some loss of height  involving the midthoracic vertebral body levels chronic in appearance with  compression and kyphoplasty involving T10.  Chronic loss of height of T6 and  T7.     IMPRESSION:  1. No mediastinal or hilar mass.  2. Underlying chronic and emphysematous changes seen within the lung fields.  There is some interseptal thickening and mild ground-glass opacity to suggest  mild interstitial edema.  3. Enlargement of the pulmonary arteries to suggest pulmonary arterial  hypertension.  4. Cardiomegaly.          Echo 8/29/23:   Summary   Left ventricular internal dimensions were normal in diastole and systole.   Moderate left ventricular concentric hypertrophy noted.   No regional wall motion abnormalities seen.   Normal left ventricular ejection fraction.   Moderately dilated right ventricle.   Right ventricle global systolic function is reduced.   Moderately enlarged right atrium size.   Mild tricuspid regurgitation.   Pulmonary hypertension is moderate to severe .   There is a small circumferential pericardial effusion noted.   Moderate left pleural effusion.    Labs:  Lab Results   Component Value Date/Time    WBC 4.8 05/19/2024 05:16 AM    RBC 3.42 05/19/2024 05:16 AM    RBC 3.57 12/14/2021 09:57 AM    HGB 8.8 05/19/2024 05:16 AM    HGB 14.0 04/28/2023 02:54 PM    HCT 28.7 05/19/2024 05:16 AM    HCT 41.0 04/28/2023 02:54 PM    MCV 83.9 05/19/2024 05:16 AM    MCH 25.7 05/19/2024 05:16 AM    MCHC 30.7 05/19/2024 05:16 AM    RDW 16.4 05/19/2024 05:16 AM     05/19/2024 05:16 AM    MPV 10.5 05/19/2024 
ventricular internal dimensions were normal in diastole and systole.   Moderate left ventricular concentric hypertrophy noted.   No regional wall motion abnormalities seen.   Normal left ventricular ejection fraction.   Moderately dilated right ventricle.   Right ventricle global systolic function is reduced.   Moderately enlarged right atrium size.   Mild tricuspid regurgitation.   Pulmonary hypertension is moderate to severe .   There is a small circumferential pericardial effusion noted.   Moderate left pleural effusion.    Labs:  Lab Results   Component Value Date/Time    WBC 4.3 05/16/2024 04:45 AM    RBC 3.31 05/16/2024 04:45 AM    RBC 3.57 12/14/2021 09:57 AM    HGB 8.4 05/16/2024 04:45 AM    HGB 14.0 04/28/2023 02:54 PM    HCT 28.4 05/16/2024 04:45 AM    HCT 41.0 04/28/2023 02:54 PM    MCV 85.8 05/16/2024 04:45 AM    MCH 25.4 05/16/2024 04:45 AM    MCHC 29.6 05/16/2024 04:45 AM    RDW 16.3 05/16/2024 04:45 AM     05/16/2024 04:45 AM    MPV 10.4 05/16/2024 04:45 AM     Lab Results   Component Value Date/Time     05/16/2024 04:45 AM    K 4.9 05/16/2024 04:45 AM    K 3.8 07/14/2023 04:12 AM     05/16/2024 04:45 AM    CO2 30 05/16/2024 04:45 AM    BUN 26 05/16/2024 04:45 AM    CREATININE 1.1 05/16/2024 04:45 AM    CALCIUM 9.2 05/16/2024 04:45 AM    GFRAA >60 10/04/2022 06:02 AM    LABGLOM 54 05/16/2024 04:45 AM    LABGLOM 58 04/28/2024 02:19 PM     Lab Results   Component Value Date/Time    PROTIME 18.8 05/11/2024 04:41 AM    PROTIME 12.7 05/02/2012 07:40 PM    INR 1.7 05/11/2024 04:41 AM     Recent Labs     05/16/24  0445   PROBNP 5,524*         No results for input(s): \"PROCAL\" in the last 72 hours.       Assessment:    Acute on Chronic respiratory failure with hypoxia and hypercapnia  Gold stage III severe COPD with exacerbation  LINDSAY on home BiPAP  Severe pulmonary hypertension pre and postcapillary on sildenafil.  Follows at [facility]  History of nicotine dependence in remission  Paroxysmal 
  Final Result   1. No acute cardiopulmonary process.   2. Prominence of the right and left perihilar region suggest pulmonary   hypertension.         XR CHEST (2 VW)   Final Result   1.  No significant change.  Cardiomegaly and chronic appearing pulmonary   venous congestion.      2.  Pulmonary hypertension/cor pulmonale.      3.  No pneumonia identified.         XR SHOULDER LEFT (MIN 2 VIEWS)   Final Result   No acute abnormality.      AC and glenohumeral joint degenerative changes with decreased acromial   humeral interval.  Irregular surface of the superior humeral head.         XR CHEST (2 VW)   Final Result   Stable appearance of the chest compared to 05/22/2024.         XR CHEST PORTABLE   Final Result   1. Cardiomegaly with bilateral hazy airspace disease favored to represent   CHF.  Pneumonia cannot be excluded in the proper clinical setting.         XR CHEST PORTABLE   Final Result   Cardiomegaly with mild interstitial edema.         XR CHEST PORTABLE   Final Result   Worsening interstitial edema in the interval.  No change in the small left   pleural effusion.         XR CHEST PORTABLE   Final Result   1. There is interstitial prominence concerning for pulmonary edema or fluid   overload, slightly improved.   2. Tiny left pleural effusion and/or atelectasis.         XR CHEST PORTABLE   Final Result   1. Cardiomegaly with pulmonary vascular congestion.   2. Bilateral lung base atelectasis.         XR CHEST PORTABLE   Final Result   1. No sign of any definite infiltrate in the retrocardiac left lower lobe.   The appearance of an infiltrate in this region was probably the result of   patient rotation.   2. Stable mild patchy density in the right lung base, probably atelectasis.   3. Stable mild pulmonary fibrosis.   4. 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         XR CHEST PORTABLE   Final Result   1. Dense alveolar infiltrate in the left lung base with small bilateral 
05/31/2024 04:36 AM    HCT 41.0 04/28/2023 02:54 PM    MCV 85.3 05/31/2024 04:36 AM    MCH 25.8 05/31/2024 04:36 AM    MCHC 30.3 05/31/2024 04:36 AM    RDW 17.7 05/31/2024 04:36 AM     05/30/2024 07:35 PM    MPV 10.6 05/31/2024 04:36 AM     Lab Results   Component Value Date/Time     05/31/2024 04:36 AM    K 4.2 05/31/2024 04:36 AM    K 3.8 07/14/2023 04:12 AM    CL 98 05/31/2024 04:36 AM    CO2 29 05/31/2024 04:36 AM    BUN 34 05/31/2024 04:36 AM    CREATININE 1.3 05/31/2024 04:36 AM    CALCIUM 9.0 05/31/2024 04:36 AM    GFRAA >60 10/04/2022 06:02 AM    LABGLOM 46 05/31/2024 04:36 AM    LABGLOM 58 04/28/2024 02:19 PM     Lab Results   Component Value Date/Time    PROTIME 18.8 05/11/2024 04:41 AM    PROTIME 12.7 05/02/2012 07:40 PM    INR 1.7 05/11/2024 04:41 AM     Recent Labs     05/30/24  1935   PROBNP 6,451*          Latest Reference Range & Units 05/18/24 10:39 05/30/24 11:00   POC pH 7.350 - 7.450  7.491 (H) 7.511 (H)   POC pCO2 35.0 - 45.0 mm Hg 47.5 (H) 42.2   POC PO2 60.0 - 80.0 mm Hg 137.5 (H) 181.0 (H)   POC HCO3 22.0 - 26.0 mmol/L 36.3 (H) 33.8 (H)   POC O2 SAT 92.0 - 98.5 % 99.3 (H) 99.7 (H)   (H): Data is abnormally high   Assessment:    Acute on Chronic respiratory failure with hypoxia and hypercapnia  Gold stage III severe COPD with exacerbation  LINDSAY on home BiPAP  Severe pulmonary hypertension pre and postcapillary on sildenafil.  Follows at [facility]  History of nicotine dependence in remission  Paroxysmal A-fib on eliquis  History of PE, history of right IJ thrombus on chronic anticoagulation  Secondary adrenal insufficiency chronic Cortef dosing  Diabetes insipidus on DDAVP  History of PEA arrest August 2023  Pulmonary sarcoidosis  RRT and transferred to ICU on 5/30/2024 for worsening respiratory status and episode of emesis  Aspiration event, HCAP  Leukocytosis      Plan:   Oxygen increased to AIRVO 60 L FIO2 100% SpO2 89%  Continue Zosyn and vancomycin  Follow daily chest x-ray, 
[facility]  History of nicotine dependence in remission  Paroxysmal A-fib on eliquis  History of PE, history of right IJ thrombus on chronic anticoagulation  Secondary adrenal insufficiency chronic Cortef dosing  Diabetes insipidus on DDAVP  History of PEA arrest August 2023  Pulmonary sarcoidosis      Plan:   Wean oxygen as tolerated to patients baseline of  8-10 liters NC okay for SpO2 greater than 88%  Will switch to AVAPS volume 450 EPAP +6 backup rate 14 FiO2 titrate for SpO2 greater than 88%  Will attempt to arrange Ledy noninvasive vent/high flow oxygen delivery device for home use.  Patient has end-stage COPD, hypoxic and hypercapnic respiratory failure, severe pulmonary hypertension with frequent hospitalizations.  Would benefit from device that provides in home high flow O2 delivery that also provides noninvasive ventilation settings for treatment of hypoxic hypercapnic respiratory failure.  She has had repeat hospital admissions related to this problem and is at risk for dying if she does not get to the hospital in time.  Due to her underlying chronic condition she also has required high flow oxygen that is not available on her current home device.  Follow chest x-ray 5/20/2024   Lasix 40 mg adjusted to p.o. daily nephrology for management of diabetes insipidus, on chronic DDAVP  Chronic Cortef dosing for adrenal insufficiency  Monitor off antibiotics  Guaifenesin as needed  Encourage use of incentive spirometer, flutter valve  Scheduled bronchodilators-Brovana and Pulmicort twice daily, DuoNebs every 4 hours while awake. Albuterol nebs prn.  EZ Pap every 4 hours, lung recruitment  Sildenafil 3 times daily-follows at [facility]    This plan of care was reviewed in collaboration with Dr. Agarwal  Electronically signed by FRIDA Krishna - CNP on 5/21/2024 at 4:23 PM      This is confirmation that I have personally performed a substantial portion of medical decision making (>50%) related to this patient 
DDAVP  History of PEA arrest August 2023  Pulmonary sarcoidosis      Plan:   Wean oxygen as tolerated to patients baseline of 5- 6 liters NC  ABG as needed  NIV in the form of BiPAP with home settings of 14/6, BUR 16  follows Nephrology outpatient for management of diabetes insipidus, on chronic DDAVP  Chronic Cortef dosing for adrenal insufficiency  CT chest mild pulmonary edema  CXR 5/13/24   Lasix twice daily on hold.  Monitor off antibiotics  Guaifenesin as needed  incentive spirometer, flutter valve  Scheduled bronchodilators-Brovana and Pulmicort twice daily, DuoNebs every 4 hours while awake. Albuterol nebs prn  Sildenafil 3 times daily-follows at [facility]    This plan of care was reviewed in collaboration with Dr Roy    Electronically signed by FRIDA Krishna - CNP on 5/13/2024 at 3:46 PM      I personally saw, examined, and cared for the patient. I performed the substantive portion of the visit. Labs, medications, radiographs reviewed. I agree with history exam and plans detailed in NP note.    Resume diuretics when able.    Efrain Roy, DO            
Result   Mild pulmonary edema and small bilateral pleural effusions.      Stable enlargement of the cardiac silhouette.      Rounded soft tissue density left hilum could be due to pulmonary artery   enlargement.  This finding could be more fully evaluated with   contrast-enhanced CT.             A/P:  Principal Problem:    Acute congestive heart failure, unspecified heart failure type (HCC)  Active Problems:    Severe protein-calorie malnutrition (HCC)    Hypoxic respiratory failure (HCC)  Resolved Problems:    * No resolved hospital problems. *      Acute Hypoxic Respiratory Failure  Pulmonary Sarcoidosis  End Stage COPD Baseline Home NC 8-10L O2 and Mainly BiPaP Dependant   Severe pulmonary Hypertension. Follows at [facility].  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 Will hold off on starting antibiotics at this time.   Continue noninvasive ventilation with BiPAP. Wean oxygen as tolerated. Respiratory care team to evaluate and treat.  CXR with mild pulmonary edema and small bilateral effusions, as well as rounded soft tissue density left hilum could be due to pulmonary artery enlargement. CT chest negative for mediastinal or hilar mass and remarkable for chronic emphysematous changes, interstitial edema, pulmonary arterial hypertension and cardiomegaly.  Echo remarakble for worsning severe pulmonary HTN with RVSP of 106 when compared of RVSP of 80 on echo of 8/2023. Also remarkable for concentric remodeling, RV pressure overload, increased LAP, mild mitral regurgitation and moderate tricupid regurgitation with dilated atrias bilaterally  Pulmonology on board - Wean O2 as 
hypoxia and hypercapnia  Gold stage III severe COPD with exacerbation  LINDSAY on home BiPAP  Severe pulmonary hypertension pre and postcapillary on sildenafil.  Follows at [facility]  History of nicotine dependence in remission  Paroxysmal A-fib on eliquis  History of PE, history of right IJ thrombus on chronic anticoagulation  Secondary adrenal insufficiency chronic Cortef dosing  Diabetes insipidus on DDAVP  History of PEA arrest August 2023  Pulmonary sarcoidosis      Plan:   Wean oxygen as tolerated to patients baseline of  7-10 liters NC okay for SpO2 greater than 88%  Continue AVAPS -volume 450 EPAP +6 backup rate 14 FiO2 titrate for SpO2 greater than 88%  Repeat ABG 5/28/2024 7.41/57/66/36/10.2/91% on AVAPS 80% FiO2.  Ordered a Ledy noninvasive vent/high flow oxygen delivery device for home use.  Patient has end-stage COPD, hypoxic and hypercapnic respiratory failure, severe pulmonary hypertension with frequent hospitalizations.  Would benefit from device that provides in home high flow O2 delivery that also provides noninvasive ventilation settings for treatment of hypoxic hypercapnic respiratory failure.  She has had repeat hospital admissions related to this problem and is at risk for dying if she does not get to the hospital in time.  Due to her underlying chronic condition she also has required high flow oxygen that is not available on her current home device.  Follow chest x-ray 5/28/2024 May benefit from additional diuresis.  Lasix 40 mg p.o. daily   nephrology for management of diabetes   insipidus, on chronic DDAVP creat 1.3.   Chronic Cortef dosing for adrenal insufficiency  Encourage use of incentive spirometer, flutter valve  Scheduled bronchodilators-Brovana and Pulmicort twice daily, DuoNebs every 4 hours while awake. Albuterol nebs prn.  EZ Pap every 4 hours, lung recruitment  Sildenafil 3 times daily-follows at [facility]  This plan of care was reviewed in collaboration with Dr. Agarwal    Electronically 
remission  Paroxysmal A-fib on eliquis  History of PE, history of right IJ thrombus on chronic anticoagulation  Secondary adrenal insufficiency chronic Cortef dosing  Diabetes insipidus on DDAVP  History of PEA arrest August 2023  Pulmonary sarcoidosis      Plan:   Wean oxygen as tolerated to patients baseline of  7-10 liters NC okay for SpO2 greater than 88%  Continue AVAPS -volume 450 EPAP +6 backup rate 14 FiO2 titrate for SpO2 greater than 88%  Ordered a Ledy noninvasive vent/high flow oxygen delivery device for home use.  Patient has end-stage COPD, hypoxic and hypercapnic respiratory failure, severe pulmonary hypertension with frequent hospitalizations.  Would benefit from device that provides in home high flow O2 delivery that also provides noninvasive ventilation settings for treatment of hypoxic hypercapnic respiratory failure.  She has had repeat hospital admissions related to this problem and is at risk for dying if she does not get to the hospital in time.  Due to her underlying chronic condition she also has required high flow oxygen that is not available on her current home device.  Follow chest x-ray 5/26 reviewed-stable  Lasix 40 mg p.o. daily   nephrology for management of diabetes   insipidus, on chronic DDAVP creat 1.3.   Chronic Cortef dosing for adrenal insufficiency  Monitor off antibiotics  Guaifenesin as needed  Encourage use of incentive spirometer, flutter valve  Scheduled bronchodilators-Brovana and Pulmicort twice daily, DuoNebs every 4 hours while awake. Albuterol nebs prn.  EZ Pap every 4 hours, lung recruitment  Sildenafil 3 times daily-follows at [facility]      *patient is at her baseline , stable from a pulmonary standpoint   Ok to dc- once NIV device approved follow-up routed to office.  Appointment needs rescheduled with NP in pulmonary office.      Electronically signed by FRIDA Santizo - CNP on 5/27/2024 at 10:14 AM            
spirometry, and Guaifenesin  Continue home Sildenafil 10 mg TID for pulmonary HTN  Afebrile, no leukocytosis, no change in cough or sputum, no new infiltrates in chest x-ray. Will hold off on starting antibiotics at this time.   Continue noninvasive ventilation with BiPAP. Wean oxygen as tolerated. Respiratory care team to evaluate and treat.  CXR with mild pulmonary edema and small bilateral effusions, as well as rounded soft tissue density left hilum could be due to pulmonary artery enlargement. CT chest negative for mediastinal or hilar mass and remarkable for chronic emphysematous changes, interstitial edema, pulmonary arterial hypertension and cardiomegaly.  Lasix to 40 mg PO daily per Nephro  Echo remarakble for worsning severe pulmonary HTN with RVSP of 106 when compared of RVSP of 80 on echo of 8/2023. Also remarkable for concentric remodeling, RV pressure overload, increased LAP, mild mitral regurgitation and moderate tricupid regurgitation with dilated atrias bilaterally  Pulmonology on board - Wean O2 as tolerated to new baseline of 7-10 L NC to maintain O2 saturations > 88% and adjusted bipap settings. Arranging for NIV/HF device to be available at home. D/c after NIV is approved     Persistent Atrial Fibrillation   CHFpEF (ejection fraction 60% per Echo from 8/2023) on Lasix 20 mg daily  Hypertension   Continue home Toprol XL 50 mg BID, Digoxin 125 mcg daily, Eliquis 5 mg BID, and Norvasc 5 mg daily  Nephrology on board - Switched Lasix to 40 mg PO. Nephro signed off. Patient is to follow up with Dr. Lynn within 2 to 3 weeks, after discaharge and complete  BMP 1 week prior   Pulmonology on board -. Wean O2 as tolerated to new baseline of 7-10 L NC to maintain O2 saturations > 88% and adjusted bipap settings. Arranging for NIV/HF device to be available at home. D/c after NIV is approved  Daily weights and strict I&Os  Daily CXR and daily pro-BNP     IRMA - Resolved  Normocytic anemia  Patient received 
well as rounded soft tissue density left hilum could be due to pulmonary artery enlargement. CT chest negative for mediastinal or hilar mass and remarkable for chronic emphysematous changes, interstitial edema, pulmonary arterial hypertension and cardiomegaly.  Procalcitonin 0.14. Pending Respiratory culture.  Pulmonology on board  ECHO pending due to concern for worsening pHTN vs valvular disease     Persistent Atrial Fibrillation   CHFpEF (ejection fraction 60% per Echo from 8/2023) on Lasix 20 mg daily  Hypertension   Continue home Toprol XL 50 mg BID, Digoxin 125 mcg daily, Eliquis 5 mg BID, and Norvasc 5 mg daily  Nephrology on board  Lasix changed to IV per pulmonology  Check daily pro-BNP, only minimally improved today  Daily weights and strict I&Os  CXR unfortunately worsening today  Daily CXR     IRMA - Worsening  Normocytic anemia  Patient received contrast for CT chest on 5/10   FeUREA consistent with pre-renal injury  Nephrology on board - S/p IV albumin 12.5 mg started for 4 doses on 5/16.  Patient received 1 extra dose of IV lasix on 5/18, she is now on Lasix IV daily instead of home po per pulm    Left AC Joint Nonvascular Structure   LUE Soft tissue US with 1.6 cm x 1.4 cm x 1.2 cm nonvascular complex structure adjacent to the left AC joint   Shoulder MRI not reimbursable per inpatient/ED admission visit. Patient doesn't believe she will tolerate MRI. Consider CT as outpatient.  Symptomatic pain management      Diabetes Insipidus   Hypothyroidism   Adrenal Insufficiency   Continue home Desmopressin 200 mcg BID, Synthroid 50 mcg daily, Hydrocortisone 10 mg in the morning and 5 mg at night  TSH checked in 4/2024 was low (0.10) with normal Free T4. Repeat levels WNL  Hypoglycemia protocol in place. LDSS     Transaminitis   New onset mildly elevations since 4/2024  AST 45 and ALT 39 on admission. Elevated Alk phos 178. Continue to trend up   GGT elevated (175). Liver Fractionated Alk Phos elevated 
  Transaminitis   New onset mildly elevations since 4/2024  AST 45 and ALT 39 on admission. Elevated Alk phos 178. Continue to trend up   GGT elevated (175). Liver Fractionated Alk Phos elevated (118)  Consider liver US if continues to worsen     Normocytic anemia  Iron deficiency  Iron studies obtained in 4/2024 remarkable for low TIBC (231), low transferrin (117), low % saturation (12). Elevated B12. Iron levels and folate WNL  Resumed home ferrous sulfate   Hgb stable      Chronic Pain Syndrome  Anxiety  Cymbalta decreased to 30 mg due to concern for worsening renal function     History of Seizures   Weakness  Continue home Keppra 500 mg BID  PT/OT consulted - patient declined evaluation on 5/15. Pending re-evaluation  Fall precautions in place. Order placed for bedside sitter     Urinary Urgency/Frequency  Urine analysis with microscopic negative for UTI.  Urine culture with mixed tanesha.      PT/OT: On board   DVT Prophylaxis: Eliquis 5 mg BID   GI Prophylaxis: Protonix 40 mg daily  Diet: Adult low salt diet with fluid restriction 1500 mL  Code Status: Full  Disposition: Continue current management, pending pulm recs/ECHO      Electronically signed by Kerrie Bae MD on 5/20/2024 at 4:26 AM  This case was discussed with attending physician Dr. Lizama  
  hypertension.   4. Cardiomegaly.         Vascular duplex upper extremity venous left   Final Result   No evidence of DVT.         US SOFT TISSUE LIMITED AREA   Final Result   1.6 cm x 1.4 cm x 1.2 cm nonvascular complex structure adjacent to the left   AC joint. Correlate with history of prior trauma.      RECOMMENDATION:   MRI may be helpful to further characterize if clinically warranted.         XR CHEST PORTABLE   Final Result   Mild pulmonary edema and small bilateral pleural effusions.      Stable enlargement of the cardiac silhouette.      Rounded soft tissue density left hilum could be due to pulmonary artery   enlargement.  This finding could be more fully evaluated with   contrast-enhanced CT.         XR CHEST PORTABLE    (Results Pending)   XR CHEST PORTABLE    (Results Pending)   Vascular duplex lower extremity venous bilateral    (Results Pending)       A/P:  Principal Problem:    Acute congestive heart failure, unspecified heart failure type (HCC)  Active Problems:    Severe protein-calorie malnutrition (HCC)    Hypoxic respiratory failure (HCC)    Acute pulmonary edema (HCC)  Resolved Problems:    * No resolved hospital problems. *      Acute on chronic respiratory failure with hypoxia and hypercapnia  Septic shock  Lactic acidosis   Pneumonia   Pulmonary Sarcoidosis  End Stage COPD on  8-10 L  and  BiPAP Dependant   Severe pulmonary Hypertension  History of Pulmonary Embolism and Right Internal Jugular Thrombosis on Eliquis  History of PEA Arrest in August 2023  S/P PEA arrest 6/1, intubated and sedated. On vasopressin   Continue DuoNeb, Pulmicort and Brovana   Continue home Sildenafil 10 mg TID   Daily CXR   Continue vanco and Zosyn for possible aspiration pneumonia  Strep, legionalla neg  Respiratory and blood cx pending   Trend LA     Persistent Atrial Fibrillation   CHFpEF, EF 60% 8/2023  Hypertension   home meds Toprol XL, Digoxin, lasix and Norvasc on hold   Continue home med eliquis 
CNP on 5/22/2024 at 11:46 AM          This is confirmation that I have personally performed a substantial portion of medical decision making (>50%) related to this patient encounter.  The medications & laboratory data and imagery were discussed and adjusted where necessary. Key issues of the case were discussed among consultants.  Review of CNP documentation was conducted and revisions were made as appropriate. I agree with the above documented exam, problem list and plan of care with the following additions:    Patient is doing well and in    Baseline.  I personally talked to Psychiatric representative and filled out her forms for her home NIV.  Hopefully she her device will be approved soon.  Wean her FiO2 for saturations above 90 to 92% and he has baseline is at 6 L.  She increases her oxygen up to 8 to 10 L with ambulation.  Continue NIV with naps and nightly.    Joseph Agarwal MD         
and 5 mg at night  TSH checked in 4/2024 was low (0.10) with normal Free T4. Repeat levels WNL  Hypoglycemia protocol in place. LDSS     Transaminitis   New onset mildly elevations since 4/2024  AST 45 and ALT 39 on admission. Elevated Alk phos 178. Continue to trend up   GGT elevated (175). Liver Fractionated Alk Phos elevated (118)  Consider liver US if continues to worsen     Normocytic anemia  Iron deficiency  Iron studies obtained in 4/2024 remarkable for low TIBC (231), low transferrin (117), low % saturation (12). Elevated B12. Iron levels and folate WNL  Resumed home ferrous sulfate   Hgb stable      Chronic Pain Syndrome  Anxiety  Cymbalta decreased to 30 mg due to concern for worsening renal function     History of Seizures   Weakness  Continue home Keppra 500 mg BID  PT/OT consulted - patient declined evaluation on 5/15. Pending re-evaluation  Fall precautions in place. Order placed for bedside sitter     Urinary Urgency/Frequency  Urine analysis with microscopic negative for UTI. Urine culture with mixed tanesha.      PT/OT: On board   DVT Prophylaxis: Eliquis 5 mg BID   GI Prophylaxis: Protonix 40 mg daily  Diet: Adult low salt diet with fluid restriction 1500 mL  Code Status: Full  Disposition: Continue current management, pending pulm recs/ECHO. [facility] to [facility]      Electronically signed by Kerrie Bae MD on 5/21/2024 at 4:13 AM  This case was discussed with attending physician Dr. Lizama  
congestion. On Lasix 40 mg PO daily. Will give additional dose of 40 mg IV x 1 today and repeat CXR in AM  Nephrology for management of diabetes insipidus, on chronic DDAVP  Chronic Cortef dosing for adrenal insufficiency  Monitor off antibiotics  Guaifenesin as needed  Encourage use of incentive spirometer, flutter valve  Scheduled bronchodilators-Brovana and Pulmicort twice daily, DuoNebs every 4 hours while awake. Albuterol nebs prn.  Add EZ Pap every 4 hours, lung recruitment  Sildenafil 3 times daily-follows at [facility]  No discharge today      Electronically signed by FRIDA Santizo - CNP on 5/18/2024 at 9:12 AM                   
contrast for CT chest on 5/10   FeUREA consistent with pre-renal injury  Nephrology on board - S/p IV albumin 12.5 mg started for 4 doses on 5/16. Switched Lasix to 40 mg PO    Left AC Joint Nonvascular Structure   LUE Soft tissue US with 1.6 cm x 1.4 cm x 1.2 cm nonvascular complex structure adjacent to the left AC joint   Shoulder MRI not reimbursable per inpatient/ED admission visit. Patient doesn't believe she will tolerate MRI. Consider CT as outpatient.  Symptomatic pain management      Diabetes Insipidus   Hypothyroidism   Adrenal Insufficiency   Continue home Desmopressin 200 mcg BID, Synthroid 50 mcg daily, Hydrocortisone 10 mg in the morning and 5 mg at night  TSH checked in 4/2024 was low (0.10) with normal Free T4. Repeat levels WNL  Hypoglycemia protocol in place. LDSS     Transaminitis   New onset mildly elevations since 4/2024  AST 45 and ALT 39 on admission. Elevated Alk phos 178. Continue to trend up   GGT elevated (175). Liver Fractionated Alk Phos elevated (118)  Consider liver US if continues to worsen     Normocytic anemia  Iron deficiency  Iron studies obtained in 4/2024 remarkable for low TIBC (231), low transferrin (117), low % saturation (12). Elevated B12. Iron levels and folate WNL  Resumed home ferrous sulfate   Hgb stable      Chronic Pain Syndrome  Anxiety  Cymbalta decreased to 30 mg due to concern for worsening renal function     History of Seizures   Weakness  Continue home Keppra 500 mg BID  PT/OT consulted - patient declined evaluation on 5/15. Pending re-evaluation  Fall precautions in place. Order placed for bedside sitter     Urinary Urgency/Frequency  Urine analysis with microscopic negative for UTI. Urine culture with mixed tanesha.      PT/OT: Am pac 11/24 on 5/22  DVT Prophylaxis: Eliquis 5 mg BID   GI Prophylaxis: Protonix 40 mg daily  Diet: Adult low salt diet with fluid restriction 1500 mL  Code Status: Full  Disposition: D/c home with [facility] after NIV is approved 
interventions that required frequent physician assessment. I devoted my full attention to the direct care of this patient for the period of time indicated below. Time I spent with family of surrogate(s) is included only if the patient was incapable of providing the necessary information or participating in medical decision making. In addition to time devoted to teaching and to any procedure. CCT 39 minutes    Elijah Cobb DO, SUSIE, FACP, FCCP   
leukocytosis, no change in cough or sputum, no new infiltrates in chest x-ray. Will hold off on starting antibiotics at this time.   Continue noninvasive ventilation with BiPAP. Wean oxygen as tolerated. Respiratory care team to evaluate and treat.  CXR with mild pulmonary edema and small bilateral effusions, as well as rounded soft tissue density left hilum could be due to pulmonary artery enlargement. CT chest negative for mediastinal or hilar mass and remarkable for chronic emphysematous changes, interstitial edema, pulmonary arterial hypertension and cardiomegaly.  Echo remarakble for worsning severe pulmonary HTN with RVSP of 106 when compared of RVSP of 80 on echo of 8/2023. Also remarkable for concentric remodeling, RV pressure overload, increased LAP, mild mitral regurgitation and moderate tricupid regurgitation with dilated atrias bilaterally  Last night RRT was called per pulmonology due to continues hypoxia despite being on airvo 100% at 60L and 15 L NRB. Transferred to MICU  Started on Zosyn due possibility of aspiration pneumonia and worsening chest x-ray and up-trending WBC  Still awaiting Ledy device's prior auth prior to discharge.  Awaiting approval to LTAC in the setting of increased O2 requirements.         Persistent Atrial Fibrillation   CHFpEF (ejection fraction 60% per Echo from 8/2023) on Lasix 20 mg daily  Hypertension   Continue home Toprol XL 50 mg BID, Digoxin 125 mcg daily, Eliquis 5 mg BID, and Norvasc 5 mg daily  Nephrology on board - Switched Lasix to 40 mg PO. Nephro signed off. Patient is to follow up with Dr. Lynn within 2 to 3 weeks, after discaharge and complete  BMP 1 week prior   Daily weights and strict I&Os     IRMA - Improving   Normocytic anemia  Patient received contrast for CT chest on 5/10   FeUREA consistent with pre-renal injury  Nephrology on board - S/p IV albumin 12.5 mg started for 4 doses on 5/16.   Mild IRMA noted on 5/29 with Creatinine 1.5, likely due to 
tolerated to new baseline of 7-10 L NC to maintain O2 saturations > 88% and adjusted bipap settings. Arranging for NIV/HF device to be available at home. D/c after NIV is approved  Daily weights and strict I&Os     IRMA - Resolved  Normocytic anemia  Patient received contrast for CT chest on 5/10   FeUREA consistent with pre-renal injury  Nephrology signed off - S/p IV albumin 12.5 mg started for 4 doses on 5/16. Switched Lasix to 40 mg PO    Left AC Joint Nonvascular Structure   LUE Soft tissue US with 1.6 cm x 1.4 cm x 1.2 cm nonvascular complex structure adjacent to the left AC joint   Shoulder MRI not reimbursable per inpatient/ED admission visit. Patient doesn't believe she will tolerate MRI. Consider CT as outpatient.  Symptomatic pain management   No acute changes on XR 5/26     Diabetes Insipidus   Hypothyroidism   Adrenal Insufficiency   Continue home Desmopressin 200 mcg BID, Synthroid 50 mcg daily, Hydrocortisone 10 mg in the morning and 5 mg at night  TSH checked in 4/2024 was low (0.10) with normal Free T4. Repeat levels WNL  Hypoglycemia protocol in place. LDSS     Transaminitis - improving  New onset mildly elevations since 4/2024  AST 45 and ALT 39 on admission. Elevated Alk phos 178. Trending down  GGT elevated (175). Liver Fractionated Alk Phos elevated (118)  Consider liver US if continues to worsen     Normocytic anemia  Iron deficiency  Iron studies obtained in 4/2024 remarkable for low TIBC (231), low transferrin (117), low % saturation (12). Elevated B12. Iron levels and folate WNL  Resumed home ferrous sulfate   Hgb stable      Chronic Pain Syndrome  Anxiety  Cymbalta resumed back to home dose 60 mg daily after IRMA resolution     History of Seizures   Weakness  Continue home Keppra 500 mg BID  PT/OT consulted - patient declined evaluation on 5/15. Pending re-evaluation  Fall precautions in place. Order placed for bedside sitter     Urinary Urgency/Frequency  Urine analysis with microscopic 
medical decision making. In addition to time devoted to teaching and to any procedure. CCT 34 minutes    Elijah Cobb DO, MACOI, FACP, FCCP

## 2024-06-03 NOTE — PLAN OF CARE
Problem: Chronic Conditions and Co-morbidities  Goal: Patient's chronic conditions and co-morbidity symptoms are monitored and maintained or improved  Outcome: Progressing  Flowsheets (Taken 5/24/2024 0909)  Care Plan - Patient's Chronic Conditions and Co-Morbidity Symptoms are Monitored and Maintained or Improved: Monitor and assess patient's chronic conditions and comorbid symptoms for stability, deterioration, or improvement     Problem: Discharge Planning  Goal: Discharge to home or other facility with appropriate resources  Outcome: Progressing  Flowsheets (Taken 5/24/2024 0909)  Discharge to home or other facility with appropriate resources: Identify barriers to discharge with patient and caregiver     
  Problem: Discharge Planning  Goal: Discharge to home or other facility with appropriate resources  6/3/2024 0318 by Nannette Marie RN  Outcome: Not Progressing  6/3/2024 0318 by Nannette Marie RN  Outcome: Not Progressing     Problem: Safety - Adult  Goal: Free from fall injury  6/3/2024 0318 by Nannette Marie RN  Outcome: Progressing  6/3/2024 0318 by Nannette Marie RN  Outcome: Not Progressing     Problem: Pain  Goal: Verbalizes/displays adequate comfort level or baseline comfort level  6/3/2024 0318 by Nannette Marie RN  Outcome: Progressing  6/3/2024 0318 by Nannette Marie RN  Outcome: Not Progressing     Problem: Skin/Tissue Integrity  Goal: Absence of new skin breakdown  Description: 1.  Monitor for areas of redness and/or skin breakdown  2.  Assess vascular access sites hourly  3.  Every 4-6 hours minimum:  Change oxygen saturation probe site  4.  Every 4-6 hours:  If on nasal continuous positive airway pressure, respiratory therapy assess nares and determine need for appliance change or resting period.  6/3/2024 0318 by Nannette Marie RN  Outcome: Progressing  6/3/2024 0318 by Nannette Marie RN  Outcome: Not Progressing     Problem: ABCDS Injury Assessment  Goal: Absence of physical injury  6/3/2024 0318 by Nannette Marie RN  Outcome: Progressing  6/3/2024 0318 by Nannette Marie RN  Outcome: Not Progressing     Problem: Safety - Medical Restraint  Goal: Remains free of injury from restraints (Restraint for Interference with Medical Device)  Description: INTERVENTIONS:  1. Determine that other, less restrictive measures have been tried or would not be effective before applying the restraint  2. Evaluate the patient's condition at the time of restraint application  3. Inform patient/family regarding the reason for restraint  4. Q2H: Monitor safety, psychosocial status, comfort, nutrition and hydration  6/3/2024 0318 by Frank 
  Problem: Nutrition Deficit:  Goal: Optimize nutritional status  Outcome: Not Progressing     Problem: Safety - Adult  Goal: Free from fall injury  6/2/2024 0905 by Arlene Quijano RN  Outcome: Progressing  6/2/2024 0558 by Thelma Abrams RN  Outcome: Progressing     Problem: Chronic Conditions and Co-morbidities  Goal: Patient's chronic conditions and co-morbidity symptoms are monitored and maintained or improved  6/2/2024 0905 by Arlene Quijano RN  Outcome: Progressing  6/2/2024 0558 by Thelma Abrams RN  Outcome: Progressing     Problem: Discharge Planning  Goal: Discharge to home or other facility with appropriate resources  Outcome: Progressing     Problem: Pain  Goal: Verbalizes/displays adequate comfort level or baseline comfort level  6/2/2024 0905 by Arlene Quijano RN  Outcome: Progressing  6/2/2024 0558 by Thelma Abrams RN  Outcome: Progressing     Problem: Skin/Tissue Integrity  Goal: Absence of new skin breakdown  Description: 1.  Monitor for areas of redness and/or skin breakdown  2.  Assess vascular access sites hourly  3.  Every 4-6 hours minimum:  Change oxygen saturation probe site  4.  Every 4-6 hours:  If on nasal continuous positive airway pressure, respiratory therapy assess nares and determine need for appliance change or resting period.  6/2/2024 0905 by Arlene Quijano RN  Outcome: Progressing  6/2/2024 0558 by Thelma Abrams RN  Outcome: Progressing     Problem: ABCDS Injury Assessment  Goal: Absence of physical injury  6/2/2024 0905 by Arlene Quijano RN  Outcome: Progressing  6/2/2024 0558 by Thelma Abrams RN  Outcome: Progressing     Problem: Nutrition Deficit:  Goal: Optimize nutritional status  Outcome: Not Progressing     
  Problem: Nutrition Deficit:  Goal: Optimize nutritional status  Outcome: Not Progressing     Problem: Safety - Adult  Goal: Free from fall injury  Outcome: Progressing     Problem: Chronic Conditions and Co-morbidities  Goal: Patient's chronic conditions and co-morbidity symptoms are monitored and maintained or improved  Outcome: Progressing     Problem: Discharge Planning  Goal: Discharge to home or other facility with appropriate resources  Outcome: Progressing     Problem: Pain  Goal: Verbalizes/displays adequate comfort level or baseline comfort level  Outcome: Progressing     Problem: Skin/Tissue Integrity  Goal: Absence of new skin breakdown  Description: 1.  Monitor for areas of redness and/or skin breakdown  2.  Assess vascular access sites hourly  3.  Every 4-6 hours minimum:  Change oxygen saturation probe site  4.  Every 4-6 hours:  If on nasal continuous positive airway pressure, respiratory therapy assess nares and determine need for appliance change or resting period.  Outcome: Progressing     Problem: ABCDS Injury Assessment  Goal: Absence of physical injury  Outcome: Progressing     Problem: Nutrition Deficit:  Goal: Optimize nutritional status  Outcome: Not Progressing     
  Problem: Safety - Adult  Goal: Free from fall injury  5/11/2024 0311 by Adarsh Pickard, RN  Outcome: Progressing     Problem: Discharge Planning  Goal: Discharge to home or other facility with appropriate resources  5/11/2024 0311 by Adarsh Pickard, RN  Outcome: Progressing  5/11/2024 0311 by Adarsh Pickard, RN  Outcome: Progressing  Flowsheets (Taken 5/11/2024 0244)  Discharge to home or other facility with appropriate resources:   Identify barriers to discharge with patient and caregiver   Arrange for needed discharge resources and transportation as appropriate     
  Problem: Safety - Adult  Goal: Free from fall injury  5/11/2024 1302 by Kylie Gonzalez RN  Outcome: Progressing  5/11/2024 0311 by Adarsh Pickard RN  Outcome: Progressing  5/11/2024 0311 by Adarsh Pickard RN  Outcome: Progressing     Problem: Chronic Conditions and Co-morbidities  Goal: Patient's chronic conditions and co-morbidity symptoms are monitored and maintained or improved  Outcome: Progressing  Flowsheets (Taken 5/11/2024 0800)  Care Plan - Patient's Chronic Conditions and Co-Morbidity Symptoms are Monitored and Maintained or Improved: Monitor and assess patient's chronic conditions and comorbid symptoms for stability, deterioration, or improvement     Problem: Discharge Planning  Goal: Discharge to home or other facility with appropriate resources  5/11/2024 1302 by Kylie Gonzalez RN  Outcome: Progressing  Flowsheets (Taken 5/11/2024 0800)  Discharge to home or other facility with appropriate resources: Identify barriers to discharge with patient and caregiver  5/11/2024 0311 by Adarsh Pickard RN  Outcome: Progressing  5/11/2024 0311 by Adarsh Pickard RN  Outcome: Progressing  Flowsheets (Taken 5/11/2024 0244)  Discharge to home or other facility with appropriate resources:   Identify barriers to discharge with patient and caregiver   Arrange for needed discharge resources and transportation as appropriate     Problem: Pain  Goal: Verbalizes/displays adequate comfort level or baseline comfort level  Outcome: Progressing     
  Problem: Safety - Adult  Goal: Free from fall injury  5/12/2024 0007 by Karla Fregoso, RN  Outcome: Progressing  5/11/2024 1302 by Kylie Gonzalez RN  Outcome: Progressing     
  Problem: Safety - Adult  Goal: Free from fall injury  5/12/2024 1403 by Zari Cerda RN  Outcome: Progressing  5/12/2024 0007 by Karla Fregoso RN  Outcome: Progressing     Problem: Chronic Conditions and Co-morbidities  Goal: Patient's chronic conditions and co-morbidity symptoms are monitored and maintained or improved  5/12/2024 1403 by Zari Cerda RN  Outcome: Progressing  5/12/2024 0007 by Karla Fregoso RN  Outcome: Progressing     Problem: Discharge Planning  Goal: Discharge to home or other facility with appropriate resources  5/12/2024 1403 by Zari Cerda RN  Outcome: Progressing  5/12/2024 0007 by Karla Fregoso RN  Outcome: Progressing     Problem: Safety - Adult  Goal: Free from fall injury  5/12/2024 1403 by Zari Cerda RN  Outcome: Progressing  5/12/2024 0007 by Karla Fregoso RN  Outcome: Progressing     Problem: Chronic Conditions and Co-morbidities  Goal: Patient's chronic conditions and co-morbidity symptoms are monitored and maintained or improved  5/12/2024 1403 by Zari Cerda RN  Outcome: Progressing  5/12/2024 0007 by Karla Fregoso RN  Outcome: Progressing     Problem: Discharge Planning  Goal: Discharge to home or other facility with appropriate resources  5/12/2024 1403 by Zari Cerda RN  Outcome: Progressing  5/12/2024 0007 by Karla Fregoso RN  Outcome: Progressing     
  Problem: Safety - Adult  Goal: Free from fall injury  5/12/2024 1635 by Kaia Esteban RN  Outcome: Progressing  5/12/2024 1403 by Zari Cerda RN  Outcome: Progressing     Problem: Chronic Conditions and Co-morbidities  Goal: Patient's chronic conditions and co-morbidity symptoms are monitored and maintained or improved  5/12/2024 1635 by Kaia Esteban RN  Outcome: Progressing  5/12/2024 1403 by Zari Cerda RN  Outcome: Progressing     Problem: Pain  Goal: Verbalizes/displays adequate comfort level or baseline comfort level  Outcome: Progressing     Problem: Skin/Tissue Integrity  Goal: Absence of new skin breakdown  Description: 1.  Monitor for areas of redness and/or skin breakdown  2.  Assess vascular access sites hourly  3.  Every 4-6 hours minimum:  Change oxygen saturation probe site  4.  Every 4-6 hours:  If on nasal continuous positive airway pressure, respiratory therapy assess nares and determine need for appliance change or resting period.  Outcome: Progressing     
  Problem: Safety - Adult  Goal: Free from fall injury  5/13/2024 0459 by Adarsh Pickard RN  Outcome: Progressing  5/12/2024 1635 by Kaia Esteban RN  Outcome: Progressing     Problem: Chronic Conditions and Co-morbidities  Goal: Patient's chronic conditions and co-morbidity symptoms are monitored and maintained or improved  5/13/2024 0459 by Adarsh Pickard RN  Outcome: Progressing  5/12/2024 1635 by Kaia Esteban RN  Outcome: Progressing     Problem: Discharge Planning  Goal: Discharge to home or other facility with appropriate resources  Outcome: Progressing     Problem: Pain  Goal: Verbalizes/displays adequate comfort level or baseline comfort level  5/13/2024 0459 by Adarsh Pickard RN  Outcome: Progressing  5/12/2024 1635 by Kaia Esteban RN  Outcome: Progressing     Problem: Discharge Planning  Goal: Discharge to home or other facility with appropriate resources  Outcome: Progressing     Problem: Skin/Tissue Integrity  Goal: Absence of new skin breakdown  Description: 1.  Monitor for areas of redness and/or skin breakdown  2.  Assess vascular access sites hourly  3.  Every 4-6 hours minimum:  Change oxygen saturation probe site  4.  Every 4-6 hours:  If on nasal continuous positive airway pressure, respiratory therapy assess nares and determine need for appliance change or resting period.  5/13/2024 0459 by Adarsh Pickard RN  Outcome: Progressing  5/12/2024 1635 by Kaia Esteban RN  Outcome: Progressing     
  Problem: Safety - Adult  Goal: Free from fall injury  5/13/2024 1113 by Toyin Leigh, RN  Outcome: Progressing  5/13/2024 0459 by Adarsh Pickard, RN  Outcome: Progressing     
  Problem: Safety - Adult  Goal: Free from fall injury  5/14/2024 0013 by Ulices De La Cruz RN  Outcome: Progressing  5/13/2024 1113 by Toyin Leigh RN  Outcome: Progressing     Problem: Chronic Conditions and Co-morbidities  Goal: Patient's chronic conditions and co-morbidity symptoms are monitored and maintained or improved  5/14/2024 0013 by Ulices De La Cruz RN  Outcome: Progressing  5/13/2024 1113 by Toyin Leigh RN  Outcome: Progressing     Problem: Discharge Planning  Goal: Discharge to home or other facility with appropriate resources  Outcome: Progressing     Problem: Pain  Goal: Verbalizes/displays adequate comfort level or baseline comfort level  Outcome: Progressing     Problem: Skin/Tissue Integrity  Goal: Absence of new skin breakdown  Description: 1.  Monitor for areas of redness and/or skin breakdown  2.  Assess vascular access sites hourly  3.  Every 4-6 hours minimum:  Change oxygen saturation probe site  4.  Every 4-6 hours:  If on nasal continuous positive airway pressure, respiratory therapy assess nares and determine need for appliance change or resting period.  Outcome: Progressing     
  Problem: Safety - Adult  Goal: Free from fall injury  5/14/2024 0307 by Dequan Culver RN  Outcome: Progressing  5/14/2024 0013 by Ulices De La Cruz RN  Outcome: Progressing     Problem: Chronic Conditions and Co-morbidities  Goal: Patient's chronic conditions and co-morbidity symptoms are monitored and maintained or improved  5/14/2024 0013 by Ulices De La Cruz RN  Outcome: Progressing     Problem: Discharge Planning  Goal: Discharge to home or other facility with appropriate resources  5/14/2024 0307 by Dequan Culver RN  Outcome: Progressing  5/14/2024 0013 by Ulices De La Cruz RN  Outcome: Progressing     Problem: Pain  Goal: Verbalizes/displays adequate comfort level or baseline comfort level  5/14/2024 0307 by Dequan Culver RN  Outcome: Progressing  5/14/2024 0013 by Ulices De La Cruz RN  Outcome: Progressing     Problem: Skin/Tissue Integrity  Goal: Absence of new skin breakdown  Description: 1.  Monitor for areas of redness and/or skin breakdown  2.  Assess vascular access sites hourly  3.  Every 4-6 hours minimum:  Change oxygen saturation probe site  4.  Every 4-6 hours:  If on nasal continuous positive airway pressure, respiratory therapy assess nares and determine need for appliance change or resting period.  5/14/2024 0307 by Dequan Culver RN  Outcome: Progressing  5/14/2024 0013 by Ulices De La Cruz RN  Outcome: Progressing     Problem: ABCDS Injury Assessment  Goal: Absence of physical injury  Outcome: Progressing  Flowsheets (Taken 5/14/2024 0054)  Absence of Physical Injury: Implement safety measures based on patient assessment     
  Problem: Safety - Adult  Goal: Free from fall injury  5/14/2024 0809 by Celia Mccain RN  Outcome: Not Progressing  5/14/2024 0307 by Dequan Culver RN  Outcome: Progressing  5/14/2024 0013 by Ulices De La Cruz RN  Outcome: Progressing     Problem: Chronic Conditions and Co-morbidities  Goal: Patient's chronic conditions and co-morbidity symptoms are monitored and maintained or improved  5/14/2024 0809 by Celia Mccain RN  Outcome: Not Progressing  5/14/2024 0013 by Ulices De La Cruz RN  Outcome: Progressing     Problem: Discharge Planning  Goal: Discharge to home or other facility with appropriate resources  5/14/2024 0809 by Celia Mccain RN  Outcome: Not Progressing  5/14/2024 0307 by Dequan Culver RN  Outcome: Progressing  5/14/2024 0013 by Ulices De La Cruz RN  Outcome: Progressing     Problem: Pain  Goal: Verbalizes/displays adequate comfort level or baseline comfort level  5/14/2024 0809 by Celia Mccain RN  Outcome: Not Progressing  5/14/2024 0307 by Dequan Culver RN  Outcome: Progressing  5/14/2024 0013 by Ulices De La Cruz RN  Outcome: Progressing     Problem: Skin/Tissue Integrity  Goal: Absence of new skin breakdown  Description: 1.  Monitor for areas of redness and/or skin breakdown  2.  Assess vascular access sites hourly  3.  Every 4-6 hours minimum:  Change oxygen saturation probe site  4.  Every 4-6 hours:  If on nasal continuous positive airway pressure, respiratory therapy assess nares and determine need for appliance change or resting period.  5/14/2024 0809 by Celia Mccain RN  Outcome: Not Progressing  5/14/2024 0307 by Dequan Culver RN  Outcome: Progressing  5/14/2024 0013 by Ulices De La Cruz RN  Outcome: Progressing     Problem: ABCDS Injury Assessment  Goal: Absence of physical injury  5/14/2024 0809 by Celia Mccain RN  Outcome: Not Progressing  5/14/2024 0307 by Dequan Culver RN  Outcome: Progressing  Flowsheets (Taken 5/14/2024 0054)  Absence of Physical Injury: Implement safety 
  Problem: Safety - Adult  Goal: Free from fall injury  5/15/2024 2237 by Lety Enamorado RN  Outcome: Progressing  5/15/2024 1634 by Kavita Lopez RN  Outcome: Progressing     Problem: Chronic Conditions and Co-morbidities  Goal: Patient's chronic conditions and co-morbidity symptoms are monitored and maintained or improved  5/15/2024 2237 by Lety Enamorado RN  Outcome: Progressing  5/15/2024 1634 by Kavita Lopez RN  Outcome: Progressing     Problem: Discharge Planning  Goal: Discharge to home or other facility with appropriate resources  5/15/2024 2237 by Lety Enamorado RN  Outcome: Progressing  5/15/2024 1634 by Kavita Lopez RN  Outcome: Progressing     Problem: Pain  Goal: Verbalizes/displays adequate comfort level or baseline comfort level  5/15/2024 2237 by Lety Enamorado RN  Outcome: Progressing  5/15/2024 1634 by Kavita Lopez RN  Outcome: Progressing     Problem: Skin/Tissue Integrity  Goal: Absence of new skin breakdown  Description: 1.  Monitor for areas of redness and/or skin breakdown  2.  Assess vascular access sites hourly  3.  Every 4-6 hours minimum:  Change oxygen saturation probe site  4.  Every 4-6 hours:  If on nasal continuous positive airway pressure, respiratory therapy assess nares and determine need for appliance change or resting period.  5/15/2024 2237 by Lety Enamorado RN  Outcome: Progressing  5/15/2024 1634 by Kavita Lopez RN  Outcome: Progressing     Problem: ABCDS Injury Assessment  Goal: Absence of physical injury  5/15/2024 2237 by Lety Enamorado RN  Outcome: Progressing  5/15/2024 1634 by Kavita Lopez RN  Outcome: Progressing     
  Problem: Safety - Adult  Goal: Free from fall injury  5/16/2024 1141 by Hawa Vega RN  Outcome: Progressing  5/15/2024 2237 by Lety Enamorado RN  Outcome: Progressing     Problem: Pain  Goal: Verbalizes/displays adequate comfort level or baseline comfort level  5/16/2024 1141 by Hawa Vega RN  Outcome: Progressing  5/15/2024 2237 by Lety Enamorado RN  Outcome: Progressing     
  Problem: Safety - Adult  Goal: Free from fall injury  5/17/2024 1542 by Dane Ferrell RN  Outcome: Progressing  Flowsheets (Taken 5/17/2024 1130)  Free From Fall Injury: Instruct family/caregiver on patient safety  5/17/2024 0202 by Franco Crowder RN  Outcome: Progressing     Problem: Chronic Conditions and Co-morbidities  Goal: Patient's chronic conditions and co-morbidity symptoms are monitored and maintained or improved  5/17/2024 1542 by Dane Ferrell RN  Outcome: Progressing  5/17/2024 0202 by Franco Crowder RN  Outcome: Progressing     Problem: Discharge Planning  Goal: Discharge to home or other facility with appropriate resources  5/17/2024 1542 by Dane Ferrell RN  Outcome: Progressing  5/17/2024 0202 by Franco Crowder RN  Outcome: Progressing     Problem: Pain  Goal: Verbalizes/displays adequate comfort level or baseline comfort level  5/17/2024 0202 by Franco Crowder RN  Outcome: Progressing     Problem: Skin/Tissue Integrity  Goal: Absence of new skin breakdown  Description: 1.  Monitor for areas of redness and/or skin breakdown  2.  Assess vascular access sites hourly  3.  Every 4-6 hours minimum:  Change oxygen saturation probe site  4.  Every 4-6 hours:  If on nasal continuous positive airway pressure, respiratory therapy assess nares and determine need for appliance change or resting period.  5/17/2024 0202 by Franco Crowder RN  Outcome: Progressing     Problem: ABCDS Injury Assessment  Goal: Absence of physical injury  Recent Flowsheet Documentation  Taken 5/17/2024 1130 by Dane Ferrell RN  Absence of Physical Injury: Implement safety measures based on patient assessment  5/17/2024 0202 by Franco Crowder RN  Outcome: Progressing     
  Problem: Safety - Adult  Goal: Free from fall injury  5/17/2024 1620 by Zari Wise LPN  Outcome: Progressing  5/17/2024 1542 by Dane Ferrell RN  Outcome: Progressing  Flowsheets  Taken 5/17/2024 1145 by Zari Wise LPN  Free From Fall Injury: Instruct family/caregiver on patient safety  Taken 5/17/2024 1130 by Dane Ferrell RN  Free From Fall Injury: Instruct family/caregiver on patient safety     Problem: Chronic Conditions and Co-morbidities  Goal: Patient's chronic conditions and co-morbidity symptoms are monitored and maintained or improved  5/17/2024 1620 by Zari Wise LPN  Outcome: Progressing  5/17/2024 1542 by Dane Ferrell RN  Outcome: Progressing     Problem: Discharge Planning  Goal: Discharge to home or other facility with appropriate resources  5/17/2024 1620 by Zari Wise LPN  Outcome: Progressing  5/17/2024 1542 by Dane Ferrell RN  Outcome: Progressing     Problem: Pain  Goal: Verbalizes/displays adequate comfort level or baseline comfort level  Outcome: Progressing     Problem: Skin/Tissue Integrity  Goal: Absence of new skin breakdown  Description: 1.  Monitor for areas of redness and/or skin breakdown  2.  Assess vascular access sites hourly  3.  Every 4-6 hours minimum:  Change oxygen saturation probe site  4.  Every 4-6 hours:  If on nasal continuous positive airway pressure, respiratory therapy assess nares and determine need for appliance change or resting period.  Outcome: Progressing     Problem: ABCDS Injury Assessment  Goal: Absence of physical injury  Outcome: Progressing  Flowsheets  Taken 5/17/2024 1145 by Zari Wise LPN  Absence of Physical Injury: Implement safety measures based on patient assessment  Taken 5/17/2024 1130 by Dane Ferrell RN  Absence of Physical Injury: Implement safety measures based on patient assessment     
  Problem: Safety - Adult  Goal: Free from fall injury  5/18/2024 1125 by Hawa Vega, RN  Outcome: Progressing  5/18/2024 0241 by Dequan Culver, RN  Outcome: Progressing  Flowsheets (Taken 5/17/2024 2155)  Free From Fall Injury:   Instruct family/caregiver on patient safety   Based on caregiver fall risk screen, instruct family/caregiver to ask for assistance with transferring infant if caregiver noted to have fall risk factors     Problem: Chronic Conditions and Co-morbidities  Goal: Patient's chronic conditions and co-morbidity symptoms are monitored and maintained or improved  Outcome: Progressing     Problem: Chronic Conditions and Co-morbidities  Goal: Patient's chronic conditions and co-morbidity symptoms are monitored and maintained or improved  Outcome: Progressing     
  Problem: Safety - Adult  Goal: Free from fall injury  5/19/2024 0034 by Dequan Culver RN  Outcome: Progressing  Flowsheets (Taken 5/18/2024 2257)  Free From Fall Injury: Instruct family/caregiver on patient safety  5/18/2024 1125 by Hawa Vega RN  Outcome: Progressing     Problem: Chronic Conditions and Co-morbidities  Goal: Patient's chronic conditions and co-morbidity symptoms are monitored and maintained or improved  5/19/2024 0034 by Dequan Culver RN  Outcome: Progressing  5/18/2024 1125 by Hawa Vega RN  Outcome: Progressing     Problem: Discharge Planning  Goal: Discharge to home or other facility with appropriate resources  Outcome: Progressing     Problem: Pain  Goal: Verbalizes/displays adequate comfort level or baseline comfort level  Outcome: Progressing  Flowsheets (Taken 5/18/2024 2200)  Verbalizes/displays adequate comfort level or baseline comfort level:   Encourage patient to monitor pain and request assistance   Assess pain using appropriate pain scale   Administer analgesics based on type and severity of pain and evaluate response   Implement non-pharmacological measures as appropriate and evaluate response   Consider cultural and social influences on pain and pain management   Notify Licensed Independent Practitioner if interventions unsuccessful or patient reports new pain     Problem: Skin/Tissue Integrity  Goal: Absence of new skin breakdown  Description: 1.  Monitor for areas of redness and/or skin breakdown  2.  Assess vascular access sites hourly  3.  Every 4-6 hours minimum:  Change oxygen saturation probe site  4.  Every 4-6 hours:  If on nasal continuous positive airway pressure, respiratory therapy assess nares and determine need for appliance change or resting period.  Outcome: Progressing     Problem: ABCDS Injury Assessment  Goal: Absence of physical injury  Outcome: Progressing  Flowsheets (Taken 5/18/2024 2257)  Absence of Physical Injury: Implement safety measures 
  Problem: Safety - Adult  Goal: Free from fall injury  5/19/2024 1334 by Hawa Vega, RN  Outcome: Progressing  5/19/2024 0034 by Dequan Culver, RN  Outcome: Progressing  Flowsheets (Taken 5/18/2024 2257)  Free From Fall Injury: Instruct family/caregiver on patient safety     
  Problem: Safety - Adult  Goal: Free from fall injury  5/20/2024 1426 by Christa Orlando RN  Outcome: Progressing  5/20/2024 0111 by José Manuel Ken RN  Outcome: Progressing     Problem: Chronic Conditions and Co-morbidities  Goal: Patient's chronic conditions and co-morbidity symptoms are monitored and maintained or improved  5/20/2024 1426 by Christa Orlando RN  Outcome: Progressing  5/20/2024 0111 by José Manuel Ken RN  Outcome: Progressing  Flowsheets (Taken 5/19/2024 2000)  Care Plan - Patient's Chronic Conditions and Co-Morbidity Symptoms are Monitored and Maintained or Improved: Monitor and assess patient's chronic conditions and comorbid symptoms for stability, deterioration, or improvement     Problem: Discharge Planning  Goal: Discharge to home or other facility with appropriate resources  5/20/2024 1426 by Christa Orlando RN  Outcome: Progressing  5/20/2024 0111 by José Manuel Ken RN  Outcome: Progressing  Flowsheets (Taken 5/19/2024 2000)  Discharge to home or other facility with appropriate resources:   Identify barriers to discharge with patient and caregiver   Identify discharge learning needs (meds, wound care, etc)     Problem: Pain  Goal: Verbalizes/displays adequate comfort level or baseline comfort level  5/20/2024 1426 by Christa Orlando RN  Outcome: Progressing  Flowsheets (Taken 5/20/2024 0439 by José Manuel Ken RN)  Verbalizes/displays adequate comfort level or baseline comfort level:   Encourage patient to monitor pain and request assistance   Assess pain using appropriate pain scale  5/20/2024 0111 by José Manuel Ken RN  Outcome: Progressing  Flowsheets (Taken 5/20/2024 0055)  Verbalizes/displays adequate comfort level or baseline comfort level:   Encourage patient to monitor pain and request assistance   Assess pain using appropriate pain scale     Problem: Skin/Tissue Integrity  Goal: Absence of new skin breakdown  Description: 1.  Monitor for areas of redness and/or skin 
  Problem: Safety - Adult  Goal: Free from fall injury  5/21/2024 0228 by Dequan Culver RN  Outcome: Progressing  Flowsheets (Taken 5/21/2024 0226)  Free From Fall Injury:   Instruct family/caregiver on patient safety   Based on caregiver fall risk screen, instruct family/caregiver to ask for assistance with transferring infant if caregiver noted to have fall risk factors  5/20/2024 1426 by Christa Orlando RN  Outcome: Progressing     Problem: Chronic Conditions and Co-morbidities  Goal: Patient's chronic conditions and co-morbidity symptoms are monitored and maintained or improved  Recent Flowsheet Documentation  Taken 5/21/2024 0220 by Dequan Culver RN  Care Plan - Patient's Chronic Conditions and Co-Morbidity Symptoms are Monitored and Maintained or Improved:   Monitor and assess patient's chronic conditions and comorbid symptoms for stability, deterioration, or improvement   Collaborate with multidisciplinary team to address chronic and comorbid conditions and prevent exacerbation or deterioration   Update acute care plan with appropriate goals if chronic or comorbid symptoms are exacerbated and prevent overall improvement and discharge  Taken 5/20/2024 1930 by Ofelia Mohan RN  Care Plan - Patient's Chronic Conditions and Co-Morbidity Symptoms are Monitored and Maintained or Improved: Monitor and assess patient's chronic conditions and comorbid symptoms for stability, deterioration, or improvement  5/20/2024 1426 by Christa Orlando RN  Outcome: Progressing     Problem: Discharge Planning  Goal: Discharge to home or other facility with appropriate resources  5/21/2024 0228 by Dequan Culver RN  Outcome: Progressing  Flowsheets  Taken 5/21/2024 0220 by Dequan Culver RN  Discharge to home or other facility with appropriate resources:   Identify barriers to discharge with patient and caregiver   Arrange for needed discharge resources and transportation as appropriate   Identify discharge learning needs 
  Problem: Safety - Adult  Goal: Free from fall injury  5/23/2024 1242 by Astrid Vieira, RN  Outcome: Progressing  Flowsheets (Taken 5/23/2024 0800)  Free From Fall Injury: Instruct family/caregiver on patient safety     Problem: Discharge Planning  Goal: Discharge to home or other facility with appropriate resources  5/23/2024 1242 by Astrid Vieira, RN  Outcome: Progressing  Flowsheets (Taken 5/23/2024 0800)  Discharge to home or other facility with appropriate resources: Identify barriers to discharge with patient and caregiver     
  Problem: Safety - Adult  Goal: Free from fall injury  5/26/2024 2209 by Ulices De La Cruz RN  Outcome: Progressing  5/26/2024 2053 by Cherise Rodríguez RN  Outcome: Progressing     Problem: Chronic Conditions and Co-morbidities  Goal: Patient's chronic conditions and co-morbidity symptoms are monitored and maintained or improved  5/26/2024 2209 by Ulices De La Cruz RN  Outcome: Progressing  5/26/2024 2053 by Cherise Rodríguez RN  Outcome: Progressing     Problem: Discharge Planning  Goal: Discharge to home or other facility with appropriate resources  5/26/2024 2209 by Ulices De La Cruz RN  Outcome: Progressing  5/26/2024 2053 by Cherise Rodríguez RN  Outcome: Progressing     Problem: Pain  Goal: Verbalizes/displays adequate comfort level or baseline comfort level  5/26/2024 2209 by Ulices De La Cruz RN  Outcome: Progressing  5/26/2024 2053 by Cherise Rodríguez RN  Outcome: Progressing     Problem: Skin/Tissue Integrity  Goal: Absence of new skin breakdown  Description: 1.  Monitor for areas of redness and/or skin breakdown  2.  Assess vascular access sites hourly  3.  Every 4-6 hours minimum:  Change oxygen saturation probe site  4.  Every 4-6 hours:  If on nasal continuous positive airway pressure, respiratory therapy assess nares and determine need for appliance change or resting period.  5/26/2024 2209 by Ulices De La Cruz RN  Outcome: Progressing  5/26/2024 2053 by Cherise Rodríguez RN  Outcome: Progressing     Problem: ABCDS Injury Assessment  Goal: Absence of physical injury  5/26/2024 2209 by Ulices De La Cruz RN  Outcome: Progressing  5/26/2024 2053 by Cherise Rodríguez RN  Outcome: Progressing     Problem: Nutrition Deficit:  Goal: Optimize nutritional status  5/26/2024 2209 by Ulices De La Cruz RN  Outcome: Progressing  5/26/2024 2053 by Cherise Rodríguez RN  Outcome: Progressing     
  Problem: Safety - Adult  Goal: Free from fall injury  5/27/2024 1056 by Zari Cerda RN  Outcome: Progressing  5/26/2024 2209 by Ulices De La Cruz RN  Outcome: Progressing     Problem: Chronic Conditions and Co-morbidities  Goal: Patient's chronic conditions and co-morbidity symptoms are monitored and maintained or improved  5/27/2024 1056 by Zari Cerda RN  Outcome: Progressing  5/26/2024 2209 by Ulices De La Cruz RN  Outcome: Progressing     Problem: Discharge Planning  Goal: Discharge to home or other facility with appropriate resources  5/27/2024 1056 by Zari Cerda RN  Outcome: Progressing  5/26/2024 2209 by Ulices De La Cruz RN  Outcome: Progressing     Problem: Pain  Goal: Verbalizes/displays adequate comfort level or baseline comfort level  5/26/2024 2209 by Ulices De La Cruz RN  Outcome: Progressing     Problem: Skin/Tissue Integrity  Goal: Absence of new skin breakdown  Description: 1.  Monitor for areas of redness and/or skin breakdown  2.  Assess vascular access sites hourly  3.  Every 4-6 hours minimum:  Change oxygen saturation probe site  4.  Every 4-6 hours:  If on nasal continuous positive airway pressure, respiratory therapy assess nares and determine need for appliance change or resting period.  5/26/2024 2209 by Ulices De La Cruz RN  Outcome: Progressing     Problem: ABCDS Injury Assessment  Goal: Absence of physical injury  5/26/2024 2209 by Ulices De La Cruz RN  Outcome: Progressing     Problem: Nutrition Deficit:  Goal: Optimize nutritional status  5/26/2024 2209 by Ulices De La Cruz RN  Outcome: Progressing     Problem: Safety - Adult  Goal: Free from fall injury  5/27/2024 1056 by Zari Cerda RN  Outcome: Progressing  5/26/2024 2209 by Ulices De La Cruz RN  Outcome: Progressing     Problem: Chronic Conditions and Co-morbidities  Goal: Patient's chronic conditions and co-morbidity symptoms are monitored and maintained or improved  5/27/2024 1056 by Zari Cerda 
  Problem: Safety - Adult  Goal: Free from fall injury  5/27/2024 2336 by Ulices De La Cruz RN  Outcome: Progressing  5/27/2024 1056 by Zari Cerda RN  Outcome: Progressing     Problem: Chronic Conditions and Co-morbidities  Goal: Patient's chronic conditions and co-morbidity symptoms are monitored and maintained or improved  5/27/2024 2336 by Ulices De La Cruz RN  Outcome: Progressing  5/27/2024 1056 by Zari Cerda RN  Outcome: Progressing     Problem: Discharge Planning  Goal: Discharge to home or other facility with appropriate resources  5/27/2024 2336 by Ulices De La Cruz RN  Outcome: Progressing  5/27/2024 1056 by Zari Cerda RN  Outcome: Progressing     Problem: Pain  Goal: Verbalizes/displays adequate comfort level or baseline comfort level  Outcome: Progressing     Problem: Skin/Tissue Integrity  Goal: Absence of new skin breakdown  Description: 1.  Monitor for areas of redness and/or skin breakdown  2.  Assess vascular access sites hourly  3.  Every 4-6 hours minimum:  Change oxygen saturation probe site  4.  Every 4-6 hours:  If on nasal continuous positive airway pressure, respiratory therapy assess nares and determine need for appliance change or resting period.  Outcome: Progressing     Problem: ABCDS Injury Assessment  Goal: Absence of physical injury  Outcome: Progressing     Problem: Nutrition Deficit:  Goal: Optimize nutritional status  Outcome: Progressing     
  Problem: Safety - Adult  Goal: Free from fall injury  Outcome: Progressing     Problem: Chronic Conditions and Co-morbidities  Goal: Patient's chronic conditions and co-morbidity symptoms are monitored and maintained or improved  Outcome: Progressing     
  Problem: Safety - Adult  Goal: Free from fall injury  Outcome: Progressing     Problem: Chronic Conditions and Co-morbidities  Goal: Patient's chronic conditions and co-morbidity symptoms are monitored and maintained or improved  Outcome: Progressing     Problem: Discharge Planning  Goal: Discharge to home or other facility with appropriate resources  Outcome: Progressing     Problem: Pain  Goal: Verbalizes/displays adequate comfort level or baseline comfort level  Outcome: Progressing     Problem: Skin/Tissue Integrity  Goal: Absence of new skin breakdown  Description: 1.  Monitor for areas of redness and/or skin breakdown  2.  Assess vascular access sites hourly  3.  Every 4-6 hours minimum:  Change oxygen saturation probe site  4.  Every 4-6 hours:  If on nasal continuous positive airway pressure, respiratory therapy assess nares and determine need for appliance change or resting period.  Outcome: Progressing     Problem: ABCDS Injury Assessment  Goal: Absence of physical injury  Outcome: Progressing     
  Problem: Safety - Adult  Goal: Free from fall injury  Outcome: Progressing     Problem: Chronic Conditions and Co-morbidities  Goal: Patient's chronic conditions and co-morbidity symptoms are monitored and maintained or improved  Outcome: Progressing     Problem: Discharge Planning  Goal: Discharge to home or other facility with appropriate resources  Outcome: Progressing     Problem: Pain  Goal: Verbalizes/displays adequate comfort level or baseline comfort level  Outcome: Progressing     Problem: Skin/Tissue Integrity  Goal: Absence of new skin breakdown  Description: 1.  Monitor for areas of redness and/or skin breakdown  2.  Assess vascular access sites hourly  3.  Every 4-6 hours minimum:  Change oxygen saturation probe site  4.  Every 4-6 hours:  If on nasal continuous positive airway pressure, respiratory therapy assess nares and determine need for appliance change or resting period.  Outcome: Progressing     Problem: ABCDS Injury Assessment  Goal: Absence of physical injury  Outcome: Progressing     
  Problem: Safety - Adult  Goal: Free from fall injury  Outcome: Progressing     Problem: Chronic Conditions and Co-morbidities  Goal: Patient's chronic conditions and co-morbidity symptoms are monitored and maintained or improved  Outcome: Progressing     Problem: Discharge Planning  Goal: Discharge to home or other facility with appropriate resources  Outcome: Progressing     Problem: Pain  Goal: Verbalizes/displays adequate comfort level or baseline comfort level  Outcome: Progressing     Problem: Skin/Tissue Integrity  Goal: Absence of new skin breakdown  Description: 1.  Monitor for areas of redness and/or skin breakdown  2.  Assess vascular access sites hourly  3.  Every 4-6 hours minimum:  Change oxygen saturation probe site  4.  Every 4-6 hours:  If on nasal continuous positive airway pressure, respiratory therapy assess nares and determine need for appliance change or resting period.  Outcome: Progressing     Problem: ABCDS Injury Assessment  Goal: Absence of physical injury  Outcome: Progressing     Problem: Nutrition Deficit:  Goal: Optimize nutritional status  Outcome: Progressing     
  Problem: Safety - Adult  Goal: Free from fall injury  Outcome: Progressing     Problem: Chronic Conditions and Co-morbidities  Goal: Patient's chronic conditions and co-morbidity symptoms are monitored and maintained or improved  Outcome: Progressing     Problem: Discharge Planning  Goal: Discharge to home or other facility with appropriate resources  Outcome: Progressing     Problem: Pain  Goal: Verbalizes/displays adequate comfort level or baseline comfort level  Outcome: Progressing     Problem: Skin/Tissue Integrity  Goal: Absence of new skin breakdown  Description: 1.  Monitor for areas of redness and/or skin breakdown  2.  Assess vascular access sites hourly  3.  Every 4-6 hours minimum:  Change oxygen saturation probe site  4.  Every 4-6 hours:  If on nasal continuous positive airway pressure, respiratory therapy assess nares and determine need for appliance change or resting period.  Outcome: Progressing     Problem: ABCDS Injury Assessment  Goal: Absence of physical injury  Outcome: Progressing     Problem: Nutrition Deficit:  Goal: Optimize nutritional status  Outcome: Progressing  Flowsheets (Taken 5/29/2024 1017 by Paul Vaughan, RD, LD)  Nutrient intake appropriate for improving, restoring, or maintaining nutritional needs: Recommend appropriate diets, oral nutritional supplements, and vitamin/mineral supplements     
  Problem: Safety - Adult  Goal: Free from fall injury  Outcome: Progressing     Problem: Chronic Conditions and Co-morbidities  Goal: Patient's chronic conditions and co-morbidity symptoms are monitored and maintained or improved  Outcome: Progressing     Problem: Pain  Goal: Verbalizes/displays adequate comfort level or baseline comfort level  Outcome: Progressing     Problem: Skin/Tissue Integrity  Goal: Absence of new skin breakdown  Description: 1.  Monitor for areas of redness and/or skin breakdown  2.  Assess vascular access sites hourly  3.  Every 4-6 hours minimum:  Change oxygen saturation probe site  4.  Every 4-6 hours:  If on nasal continuous positive airway pressure, respiratory therapy assess nares and determine need for appliance change or resting period.  Outcome: Progressing     Problem: ABCDS Injury Assessment  Goal: Absence of physical injury  Outcome: Progressing     
  Problem: Safety - Adult  Goal: Free from fall injury  Outcome: Progressing     Problem: Chronic Conditions and Co-morbidities  Goal: Patient's chronic conditions and co-morbidity symptoms are monitored and maintained or improved  Outcome: Progressing  Flowsheets (Taken 5/21/2024 2010)  Care Plan - Patient's Chronic Conditions and Co-Morbidity Symptoms are Monitored and Maintained or Improved: Monitor and assess patient's chronic conditions and comorbid symptoms for stability, deterioration, or improvement     Problem: Discharge Planning  Goal: Discharge to home or other facility with appropriate resources  Outcome: Progressing  Flowsheets (Taken 5/21/2024 2010)  Discharge to home or other facility with appropriate resources: Identify barriers to discharge with patient and caregiver     Problem: Pain  Goal: Verbalizes/displays adequate comfort level or baseline comfort level  Outcome: Progressing     Problem: Skin/Tissue Integrity  Goal: Absence of new skin breakdown  Description: 1.  Monitor for areas of redness and/or skin breakdown  2.  Assess vascular access sites hourly  3.  Every 4-6 hours minimum:  Change oxygen saturation probe site  4.  Every 4-6 hours:  If on nasal continuous positive airway pressure, respiratory therapy assess nares and determine need for appliance change or resting period.  Outcome: Progressing     Problem: ABCDS Injury Assessment  Goal: Absence of physical injury  Outcome: Progressing     Problem: Nutrition Deficit:  Goal: Optimize nutritional status  Outcome: Progressing     
  Problem: Safety - Adult  Goal: Free from fall injury  Outcome: Progressing     Problem: Chronic Conditions and Co-morbidities  Goal: Patient's chronic conditions and co-morbidity symptoms are monitored and maintained or improved  Outcome: Progressing  Flowsheets (Taken 5/31/2024 0800)  Care Plan - Patient's Chronic Conditions and Co-Morbidity Symptoms are Monitored and Maintained or Improved: Monitor and assess patient's chronic conditions and comorbid symptoms for stability, deterioration, or improvement     Problem: Discharge Planning  Goal: Discharge to home or other facility with appropriate resources  Outcome: Progressing  Flowsheets (Taken 5/31/2024 0800)  Discharge to home or other facility with appropriate resources: Identify barriers to discharge with patient and caregiver     Problem: Pain  Goal: Verbalizes/displays adequate comfort level or baseline comfort level  Outcome: Progressing  Flowsheets  Taken 5/31/2024 1200  Verbalizes/displays adequate comfort level or baseline comfort level: Encourage patient to monitor pain and request assistance  Taken 5/31/2024 0800  Verbalizes/displays adequate comfort level or baseline comfort level: Encourage patient to monitor pain and request assistance     Problem: Skin/Tissue Integrity  Goal: Absence of new skin breakdown  Description: 1.  Monitor for areas of redness and/or skin breakdown  2.  Assess vascular access sites hourly  3.  Every 4-6 hours minimum:  Change oxygen saturation probe site  4.  Every 4-6 hours:  If on nasal continuous positive airway pressure, respiratory therapy assess nares and determine need for appliance change or resting period.  Outcome: Progressing     Problem: ABCDS Injury Assessment  Goal: Absence of physical injury  Outcome: Progressing     Problem: Nutrition Deficit:  Goal: Optimize nutritional status  Outcome: Progressing     
Attempted to call patient's spouse, Reg Rodriguez, x4 (x3 attempts on Home phone which was preferred, and x1 on cell phone). Subsequently María Madden, child, was called. María was updated on patient's current status of acute hypoxic, hypercapnic decompensation requiring intubation as well as subsequenty PEA arrest with ROSC achieved. Per María, patient would \"want everything done\" and stated to continue with current management. María stated she will update her father (patient's spouse) now as well.    Electronically signed by Edwin Wood MD on 6/1/2024 at 2:51 AM    
Called Dr. Be, about patient's persistent Acidemia, worsening kidney function, He recommended to start Bicarb drip at 50 cc/hr, and he will put the CVVHD order, needs HD line to be place.    Electronically signed by Maryan Rowell MD on 6/3/2024 at 6:46 AM    
Called patient's  Mr. Eric Rodriguez and informed her about the patient desaturating on AVAPS.  Patient's  gave permission to intubate the patient.    Anselmo Ashraf MD    
Called patient's spouse @ #  [phone]. I discussed with  regarding need for hemodialysis and consent for HD line. Patient's  declines HD and wishes that patient's code status be changed to DNR-CCA. All other questions were answered to his satisfaction.     Electronically signed by Demetrio Barker MD on 6/3/2024 at 7:49 AM    
Called to bedside by RN for bradycardia in 30s and profound hypoxia with sat of 64%. Patient was just intubated about 10 mins prior.      On assessment, femoral pulse was palpable, but significantly bradycardic.   Agonal breathing noted.   Absent breath sounds on the left.   Atropine was administered.  CXR was completed after intubation and reviewed showing tube in right mainstem bronchus.   Tube was retracted 4cm.  STAT repeat CXR was ordered.     Electronically signed by Alison Walker DO on 6/1/2024 at 1:43 AM      
I called daughter Maryanne to dicussed code status given patient is persistently unstable and is in Af-b. Patient is chemically cardioverted but remains in afib. Patient is difficulty to oxygenate after multiple vent setting change. Discussed worsening neurologic outcome given 2 cardiac arrests while in-patient. She wishes for patient to remain full code and request that I talk to patient's spouse. All questions were answered to her satisfaction.     Electronically signed by Demetrio Barker MD on 6/3/2024 at 5:52 AM    
I called patient's  Eric @ #[phone] to discuss worsening clinical status. Call went unanswered. HIPAA complaint vm left for patient to call back     Electronically signed by Demetrio Barker MD on 6/3/2024 at 5:52 AM    
I spoke with Rebekah's  Eric. I informed him that Rebekah coded again. We were able to achieve rosc but she is currently spO2 70s while intubated and on flolan. I discussed with him that she is at risk of her heart stopping again at any time and that with her current spO2 it will not be sustainable. He called her daughter María on the phone while I was talking to him and she wants us to remain full code. Eric agreed with keeping her full code.   
I spoke with poison control and the . They recommended giving the patient another dose of digifab 80 mg. They also recommended obtaining ultrafiltrated serum digoxin level to get bound and unbound levels.   
Notified by nursing that Mr. Reg Rodriguez had called the unit after his daughter called and updated him.     Provided updates to Mr. Rodriguez, including concerns for digoxin toxicity and need to evaluate further for other causes of hypoxia such as PE. He understands, appreciates the information, and plans to keep his phone nearby.     Discussed the need for emergent central and arterial line placements. Risks and benefits were reviewed and he agrees to placement of these lines.     Electronically signed by Alison Walker DO on 6/1/2024 at 3:12 AM    
Patient had significant tachycardia in the 140s, appeared a fib RVR on monitor. Then patient became bradycardic in the 30s again suddenly. Has remained hypoxic. CMP, mag, phos, lactic, trop, BNP, d dimer, and EKG ordered STAT.     EKG showing atrial fibrillation, low voltage. While assessing EKG, patient lost pulses. Concern for SA node dysfunction, possible digoxin toxicity.     [facility] was called, ran by Dr. Wood. Pharmacy called for Digifab STAT. Discussed case with poison control, who recommended dose of 80 mg total of Digifab initially.     Electronically signed by Alison Walker DO on 6/1/2024 at 2:44 AM    
Patient oxygen saturation have been decreasing persistently below 85, since 2200, Nimbex was restarted and then currently SaO2 is 70-76%, Patient was initially on Levophed, and was maximum of 35, and then vasopressin was started, ABG @0438 showed mixed respiratory and Metabolic acidosis, RR on vent increased to 24, and 1 amp of sodium bicarbonate was given, after that, her BP was 60/40, MAP was 40, Farrukh-synephrine order was placed, but never started, patient's HR was 150-170, stat EKG showed atrial fibrillation, Amiodarone bolus 150 mg was given. Stat Troponin was ordered, stat ABG was ordered. We have a high suspicion of PE, due to patients unstable condition, unable to take patient to CT scan.     Attempted twice to call family, unable to reached out, will try to call family again.     Electronically signed by Maryan Rowell MD on 6/3/2024 at 5:35 AM   
Patient's  called back and upset. Wants patient to go back to full code and HD resumed. Discussed complications including cardiac arrest. He wants everything done.     Electronically signed by Demetrio Barker MD on 6/3/2024 at 8:10 AM    
Patient's chart updated to reflect:      .    - HF care plan, HF education points and HF discharge instructions.  -Orders: 2 gram sodium diet, daily weights, I/O.  -PCP and cardiology follow up appointments to be scheduled within 7 days of hospital discharge.  -CHF education session will be provided to the patient prior to hospital discharge.    Elin Valenzuela RN   Heart Failure Navigator   
transportation as appropriate   Identify discharge learning needs (meds, wound care, etc)   Arrange for interpreters to assist at discharge as needed   Refer to discharge planning if patient needs post-hospital services based on physician order or complex needs related to functional status, cognitive ability or social support system  5/17/2024 1620 by Zari Wise LPN  Outcome: Progressing  5/17/2024 1542 by Dane Ferrell RN  Outcome: Progressing     Problem: Pain  Goal: Verbalizes/displays adequate comfort level or baseline comfort level  5/18/2024 0241 by Dequan Culver RN  Outcome: Progressing  Flowsheets (Taken 5/17/2024 2200)  Verbalizes/displays adequate comfort level or baseline comfort level:   Encourage patient to monitor pain and request assistance   Assess pain using appropriate pain scale   Administer analgesics based on type and severity of pain and evaluate response   Implement non-pharmacological measures as appropriate and evaluate response   Consider cultural and social influences on pain and pain management   Notify Licensed Independent Practitioner if interventions unsuccessful or patient reports new pain  5/17/2024 1620 by Zari Wise LPN  Outcome: Progressing     Problem: Skin/Tissue Integrity  Goal: Absence of new skin breakdown  Description: 1.  Monitor for areas of redness and/or skin breakdown  2.  Assess vascular access sites hourly  3.  Every 4-6 hours minimum:  Change oxygen saturation probe site  4.  Every 4-6 hours:  If on nasal continuous positive airway pressure, respiratory therapy assess nares and determine need for appliance change or resting period.  5/18/2024 0241 by Dequan Culver RN  Outcome: Progressing  5/17/2024 1620 by Zari Wise LPN  Outcome: Progressing     Problem: ABCDS Injury Assessment  Goal: Absence of physical injury  5/18/2024 0241 by Dequan Culver RN  Outcome: Progressing  Flowsheets (Taken 5/17/2024 2155)  Absence of Physical Injury: Implement

## 2024-06-03 NOTE — CODE DOCUMENTATION
MICU Code Blue Team  [facility]    Date of event: 6/3/2024   Time of event: 1200    Rebekah Rodriguez 68 y.o. year old female   YOB: 1955     PCP:  Lauro Ellington MD   Location: 4429/4429-A   Witnessed? : [x]Yes  [] No  Initial Code status: [x] Full    []Limited  _______________________________________________________________________________________________________________________________________________________    CODE BLUE:     Initial rhythm : Pulseless Electrical Activity (PEA)    ACLS protocol was initiated . High quality CPR [ at least 2 inches deep and at 100-120/min] performed.    Rhythm Shockable: No.     Interventions: Labs ordered: Mg, Phos, BMP, ABG  Meds/Fluids/Rx given:   EPI, Bicarb  Respiratory: Intubated       Subsequent Rhythm and pulse check:   Pulse present: No  Rhythm Check : Pulseless Electrical Activity (PEA)  Rhythm Shockable: No.     Subsequent Rhythms : Sinus José Manuel  Interventions: New labs ordered: ABG  Meds/Fluids/Rx given:   Atropine  Respiratory: Intubated    OUTCOME:    ROSC achieved: no    ACLS Protocol and Code Blue ran for 21 minutes.    Total Shocks delivered: 0    Total Meds delivered:   Epinephrine: 7  Sodium bicarbonate: 3  Amiodarone: 0  Others: Atropine    Procedural interventions: no      Disposition: ROSC not achieved. ACLS protocol and CPR continued for 21 minutes and then stopped by MICU Team. Time of death 1221 .    Patient’s family updated:     [x] Yes  [] No   Discussed with:  [x] Critical Care Intensivist: Dr. Snow      [] Primary Care Provider:       [] Other: ?    Cal Owens MD PGY-3  6/3/2024 12:30 PM  Attending Physician: Dr Snow

## 2024-06-03 NOTE — PROCEDURES
Hemodialysis catheter Insertion     Procedure: right femoral vein  Hemodialysis Catheter placement.    Indications: Hemodialysis     Consent: The legal guardian was counseled regarding the procedure, its indications, risks, potential complications and alternatives, and any questions were answered. Consent was obtained to proceed.    Number of sticks: 2    Number of Kits used: 1    Procedure: Time Out: Immediately prior to the procedure a \"timeout\" was called to verify the correct patient and procedure. The patient was place in the trendelenburg position and the skin over the right femoral vein was prepped with betadine and draped in a sterile fashion. Local anesthesia was obtained by infiltration using 1% Lidocaine without epinephrine.  With Ultrasound guidance a large bore needle was used to identify the vein, dark non pulsatile blood returned. The guide wire was then inserted through the needle with minimal resistance. 2 mm nick was made in the skin beside the guidewire. Then a dilator was inserted and removed.     At this point the patient's blood pressures dropped and patient coded. The guide wire was removed and pressure held for 10 minutes until no more bleeding. If clinically improves will try again later.       Estimated blood loss: 20 ml.    Josemanuel Perez MD PGY-2  6/3/2024  9:52 AM    Attending: Dr. Snow

## 2024-06-04 NOTE — DISCHARGE SUMMARY
interseptal thickening and mild ground-glass opacity to suggest   mild interstitial edema.   3. Enlargement of the pulmonary arteries to suggest pulmonary arterial   hypertension.   4. Cardiomegaly.         Vascular duplex upper extremity venous left   Final Result   No evidence of DVT.         US SOFT TISSUE LIMITED AREA   Final Result   1.6 cm x 1.4 cm x 1.2 cm nonvascular complex structure adjacent to the left   AC joint. Correlate with history of prior trauma.      RECOMMENDATION:   MRI may be helpful to further characterize if clinically warranted.         XR CHEST PORTABLE   Final Result   Mild pulmonary edema and small bilateral pleural effusions.      Stable enlargement of the cardiac silhouette.      Rounded soft tissue density left hilum could be due to pulmonary artery   enlargement.  This finding could be more fully evaluated with   contrast-enhanced CT.         XR CHEST PORTABLE    (Results Pending)         Treatments:   As above    Discharge Exam:  Please see exam from progress note from day of discharge    Disposition:           More than 30 minutes was spent in preparation of this patient's discharge including, but not limited to, examination, preparation of documents, prescription preparation, counseling and coordination.    Signed:  Lauro Ellington MD  2024, 9:47 AM

## 2024-06-05 LAB
MICROORGANISM SPEC CULT: NORMAL
MICROORGANISM SPEC CULT: NORMAL
SEND OUT REPORT: NORMAL
SERVICE CMNT-IMP: NORMAL
SERVICE CMNT-IMP: NORMAL
SPECIMEN DESCRIPTION: NORMAL
SPECIMEN DESCRIPTION: NORMAL
TEST NAME: NORMAL

## 2024-06-06 LAB
SEND OUT REPORT: NORMAL
TEST NAME: NORMAL

## 2025-03-31 NOTE — PLAN OF CARE
Problem: Chronic Conditions and Co-morbidities  Goal: Patient's chronic conditions and co-morbidity symptoms are monitored and maintained or improved  Flowsheets (Taken 5/5/2023 5515)  Care Plan - Patient's Chronic Conditions and Co-Morbidity Symptoms are Monitored and Maintained or Improved: Monitor and assess patient's chronic conditions and comorbid symptoms for stability, deterioration, or improvement calf

## (undated) DEVICE — INSTRUMENT POUCH: Brand: DEROYAL

## (undated) DEVICE — SWABSTCK, BENZOIN TINCTURE, 1/PK, STRL: Brand: APLICARE

## (undated) DEVICE — SPONGE LAP W18XL18IN WHT COT 4 PLY FLD STRUNG RADPQ DISP ST 2 PER PACK

## (undated) DEVICE — SOLUTION IRRIG 1000ML PENTALYTE PLAS POUR BTL TIS U SOL

## (undated) DEVICE — GAUZE,SPONGE,4"X4",8PLY,STRL,LF,10/TRAY: Brand: MEDLINE

## (undated) DEVICE — PAD,ABDOMINAL,5"X9",ST,LF,25/BX: Brand: MEDLINE INDUSTRIES, INC.

## (undated) DEVICE — DEFENDO AIR WATER SUCTION AND BIOPSY VALVE KIT FOR  OLYMPUS: Brand: DEFENDO AIR/WATER/SUCTION AND BIOPSY VALVE

## (undated) DEVICE — Device

## (undated) DEVICE — HOOK RETRACT STERIL 12MM S STL BLNT E STAY LONE STAR

## (undated) DEVICE — TAPE,WATERPROOF,CURAD,2"X10YD,LF,72/CS: Brand: CURAD

## (undated) DEVICE — PREMIUM WET SKIN PREP TRAY: Brand: MEDLINE INDUSTRIES, INC.

## (undated) DEVICE — SOLUTION IRRIG 1000ML 0.9% SOD CHL USP POUR PLAS BTL

## (undated) DEVICE — ELECTRODE PT RET AD L9FT HI MOIST COND ADH HYDRGEL CORDED

## (undated) DEVICE — TOTAL TRAY, DB, 100% SILI FOLEY, 16FR 10: Brand: MEDLINE

## (undated) DEVICE — CONNECTOR IRRIGATION AUXILIARY H2O JET W/ PRT MTL THRD HYDR

## (undated) DEVICE — UNIVERSAL DRAPE: Brand: MEDLINE INDUSTRIES, INC.

## (undated) DEVICE — GLOVE ORTHO 8   MSG9480

## (undated) DEVICE — DISSECTOR ENDOSCP L21CM TIP CURVATURE 40DEG FN CRV JAW VES

## (undated) DEVICE — BLADE,STAINLESS-STEEL,15,STRL,DISPOSABLE: Brand: MEDLINE

## (undated) DEVICE — JELLY,LUBE,STERILE,FLIP TOP,TUBE,4-OZ: Brand: MEDLINE

## (undated) DEVICE — GLOVE SURG SZ 8 L12IN FNGR THK79MIL GRN LTX FREE

## (undated) DEVICE — GAUZE,SPONGE,AVANT,4"X4",4PLY,STRL,10/TR: Brand: MEDLINE

## (undated) DEVICE — GLOVE ORANGE PI 7 1/2   MSG9075